# Patient Record
Sex: MALE | Race: WHITE | NOT HISPANIC OR LATINO | Employment: FULL TIME | URBAN - METROPOLITAN AREA
[De-identification: names, ages, dates, MRNs, and addresses within clinical notes are randomized per-mention and may not be internally consistent; named-entity substitution may affect disease eponyms.]

---

## 2017-02-02 ENCOUNTER — TELEPHONE (OUTPATIENT)
Dept: UROLOGY | Facility: CLINIC | Age: 57
End: 2017-02-02

## 2017-02-02 NOTE — TELEPHONE ENCOUNTER
----- Message from Valeria Rothman sent at 2/1/2017  8:24 PM CST -----  Contact: self  Appointment Request From: Cortes Schroeder         With Provider: Other - (see comments) [oth]        Would Accept With:Request to see a new provider        Preferred Date Range: From 1/30/2017 To 2/28/2017        Preferred Times: Monday Afternoon, Friday Afternoon        Reason for visit: Request an Appt        Comments:    Would like to have an appoint scheduled with Dr. Destin Villegas, (I am a patient of his partner Brooke Carlos A, currently)    Would like the appointment be in the [facility] location.  This is upon suggestion of  another physician, have sent an email to Dr. Acosta.

## 2017-02-10 ENCOUNTER — TELEPHONE (OUTPATIENT)
Dept: FAMILY MEDICINE | Facility: CLINIC | Age: 57
End: 2017-02-10

## 2017-02-10 DIAGNOSIS — E78.2 MIXED HYPERLIPIDEMIA: ICD-10-CM

## 2017-02-10 RX ORDER — PRAVASTATIN SODIUM 10 MG/1
10 TABLET ORAL DAILY
Qty: 90 TABLET | Refills: 0 | Status: CANCELLED | OUTPATIENT
Start: 2017-02-10 | End: 2018-02-10

## 2017-02-10 NOTE — TELEPHONE ENCOUNTER
----- Message from Treva Hurtado sent at 2/10/2017 10:50 AM CST -----  Contact: self  Refill : fluoxetine (PROZAC) 40 MG capsule ()             tramadol (ULTRAM) 50 mg tablet             meloxicam (MOBIC) 7.5 MG tablet             zolpidem (AMBIEN) 10 mg Tab             pravastatin (PRAVACHOL) 10 MG tablet      Pharmacy : [facility] DELIV[facility]  Pharmacy# [phone]

## 2017-02-10 NOTE — TELEPHONE ENCOUNTER
----- Message from Treva Hurtado sent at 2/10/2017 10:56 AM CST -----  Contact: Self  Pt calling to speak to a nurse. Pt would like to know when does his doctor want him to come in for an apt. Please call [phone]

## 2017-02-13 ENCOUNTER — OFFICE VISIT (OUTPATIENT)
Dept: FAMILY MEDICINE | Facility: CLINIC | Age: 57
End: 2017-02-13
Payer: COMMERCIAL

## 2017-02-13 VITALS
SYSTOLIC BLOOD PRESSURE: 166 MMHG | TEMPERATURE: 98 F | WEIGHT: 196.44 LBS | HEIGHT: 65 IN | RESPIRATION RATE: 16 BRPM | HEART RATE: 84 BPM | OXYGEN SATURATION: 97 % | DIASTOLIC BLOOD PRESSURE: 90 MMHG | BODY MASS INDEX: 32.73 KG/M2

## 2017-02-13 DIAGNOSIS — E78.2 MIXED HYPERLIPIDEMIA: ICD-10-CM

## 2017-02-13 DIAGNOSIS — K21.9 GASTROESOPHAGEAL REFLUX DISEASE WITHOUT ESOPHAGITIS: ICD-10-CM

## 2017-02-13 DIAGNOSIS — I10 HYPERTENSION, UNCONTROLLED: Primary | ICD-10-CM

## 2017-02-13 DIAGNOSIS — F41.9 ANXIETY: ICD-10-CM

## 2017-02-13 PROCEDURE — 99214 OFFICE O/P EST MOD 30 MIN: CPT | Mod: S$GLB,,, | Performed by: FAMILY MEDICINE

## 2017-02-13 PROCEDURE — 3077F SYST BP >= 140 MM HG: CPT | Mod: S$GLB,,, | Performed by: FAMILY MEDICINE

## 2017-02-13 PROCEDURE — 99999 PR PBB SHADOW E&M-EST. PATIENT-LVL III: CPT | Mod: PBBFAC,,, | Performed by: FAMILY MEDICINE

## 2017-02-13 PROCEDURE — 3080F DIAST BP >= 90 MM HG: CPT | Mod: S$GLB,,, | Performed by: FAMILY MEDICINE

## 2017-02-13 RX ORDER — ASPIRIN 325 MG
325 TABLET ORAL DAILY
COMMUNITY
End: 2017-02-13

## 2017-02-13 RX ORDER — PANTOPRAZOLE SODIUM 40 MG/1
40 TABLET, DELAYED RELEASE ORAL DAILY
Qty: 90 TABLET | Refills: 0 | Status: SHIPPED | OUTPATIENT
Start: 2017-02-13 | End: 2017-04-28

## 2017-02-13 RX ORDER — MELOXICAM 15 MG/1
15 TABLET ORAL DAILY
COMMUNITY
Start: 2016-12-13 | End: 2017-02-16 | Stop reason: SDUPTHER

## 2017-02-13 RX ORDER — FLUOXETINE HYDROCHLORIDE 40 MG/1
40 CAPSULE ORAL 2 TIMES DAILY
Qty: 180 CAPSULE | Refills: 3 | Status: SHIPPED | OUTPATIENT
Start: 2017-02-13 | End: 2018-04-12 | Stop reason: SDUPTHER

## 2017-02-13 RX ORDER — PANTOPRAZOLE SODIUM 40 MG/1
40 TABLET, DELAYED RELEASE ORAL DAILY
Qty: 90 TABLET | Refills: 0 | Status: SHIPPED | OUTPATIENT
Start: 2017-02-13 | End: 2017-02-13 | Stop reason: SDUPTHER

## 2017-02-13 RX ORDER — TRAMADOL HYDROCHLORIDE 50 MG/1
50 TABLET ORAL 2 TIMES DAILY
COMMUNITY
Start: 2016-12-13 | End: 2017-03-03

## 2017-02-13 RX ORDER — PRAVASTATIN SODIUM 80 MG/1
80 TABLET ORAL DAILY
Qty: 90 TABLET | Refills: 3 | Status: SHIPPED | OUTPATIENT
Start: 2017-02-13 | End: 2017-03-31 | Stop reason: SDUPTHER

## 2017-02-13 RX ORDER — TRAZODONE HYDROCHLORIDE 50 MG/1
50 TABLET ORAL NIGHTLY
Qty: 30 TABLET | Refills: 11 | Status: SHIPPED | OUTPATIENT
Start: 2017-02-13 | End: 2017-03-31

## 2017-02-13 RX ORDER — HYDROCHLOROTHIAZIDE 12.5 MG/1
12.5 TABLET ORAL DAILY
Qty: 90 TABLET | Refills: 2 | Status: SHIPPED | OUTPATIENT
Start: 2017-02-13 | End: 2017-03-22

## 2017-02-13 RX ORDER — PRAVASTATIN SODIUM 80 MG/1
80 TABLET ORAL DAILY
Qty: 90 TABLET | Refills: 3 | Status: SHIPPED | OUTPATIENT
Start: 2017-02-13 | End: 2017-02-13 | Stop reason: SDUPTHER

## 2017-02-13 RX ORDER — PRAVASTATIN SODIUM 10 MG/1
10 TABLET ORAL DAILY
Qty: 90 TABLET | Refills: 0 | Status: CANCELLED | OUTPATIENT
Start: 2017-02-13 | End: 2018-02-13

## 2017-02-13 RX ORDER — ASPIRIN 81 MG/1
81 TABLET ORAL DAILY
Refills: 0
Start: 2017-02-13 | End: 2022-02-09

## 2017-02-13 NOTE — PROGRESS NOTES
"Subjective:       Patient ID: Cortes Schroeder is a 57 y.o. male.    Chief Complaint: Abdominal Pain (follow up ); Hypertension (follow up - need med refill); Insomnia (med refill  - consult); Back Pain (med refill ); and Cough (x1 day with cream mucous )    HPI    Abd pain - pt saw GI, who stated that his pain is not Gi related, and referred him to urology for some stones that were seen on a scan. Pain is R sided and occurs daily. It is a/w some chest pain, that is being evaluated by cardiology.     Htn - Bp is elevated and has been per patient at his other doctor visits. He is not checking his blood pressure at home. Denies CP, SOB.      Insomnia- pt has persistent insomnia and has been on Ambien for years.  He is under a lot of stress from work, guilt for his father's death. He is interested to switching to another medication.    Anxiety - pt has persistent anxiety depsite fluoxetine 80mg daily. This makes it very difficult for him to sleep. He has a lot of social stressors    Current Outpatient Prescriptions on File Prior to Visit   Medication Sig Dispense Refill    BD ULTRA-FINE DANNY PEN NEEDLES 32 gauge x 5/32" Ndle Inject 1 each into the skin 4 (four) times daily before meals and nightly. 120 each 11    blood sugar diagnostic Strp 1 strip by Misc.(Non-Drug; Combo Route) route 3 (three) times daily. 300 strip 1    fish oil-omega-3 fatty acids 300-1,000 mg capsule Take by mouth 2 (two) times daily.      FREESTYLE LANCETS 28 gauge lancets USE 1 NEEDLE 4 TIMES A DAY  5    gabapentin (NEURONTIN) 100 MG capsule Take 1 capsule (100 mg total) by mouth once daily. 90 capsule 3    magnesium oxide-Mg AA chelate (MG-PLUS-PROTEIN) 133 mg Tab Take 500 mg by mouth every evening.       NOVOLOG FLEXPEN 100 unit/mL InPn pen Inject 22 Units into the skin 3 (three) times daily with meals. 59.4 mL 3    TOUJEO SOLOSTAR 300 unit/mL (1.5 mL) InPn pen Inject 70 Units into the skin every evening. 21 mL 3    vitamin D 1000 " units Tab Take 185 mg by mouth 2 (two) times daily.       [DISCONTINUED] fluoxetine (PROZAC) 40 MG capsule Take 1 capsule (40 mg total) by mouth 2 (two) times daily. 180 capsule 3    [DISCONTINUED] meloxicam (MOBIC) 7.5 MG tablet Take 7.5 mg by mouth once daily.      [DISCONTINUED] pantoprazole (PROTONIX) 40 MG tablet Take 1 tablet (40 mg total) by mouth once daily. 45 tablet 1    [DISCONTINUED] pravastatin (PRAVACHOL) 10 MG tablet Take 1 tablet (10 mg total) by mouth once daily. 90 tablet 3    [DISCONTINUED] tizanidine (ZANAFLEX) 4 MG tablet Take 1 tablet (4 mg total) by mouth nightly as needed. 25 tablet 0    [DISCONTINUED] zolpidem (AMBIEN) 10 mg Tab Take 5 mg by mouth nightly as needed.       No current facility-administered medications on file prior to visit.        Review of Systems   Respiratory: Negative for shortness of breath.    Cardiovascular: Negative for chest pain.   Gastrointestinal: Positive for abdominal pain. Negative for vomiting.       Objective:     Vitals:    02/13/17 0846   BP: (!) 166/90   Pulse: 84   Resp: 16   Temp: 98.3 °F (36.8 °C)        Physical Exam    Assessment:       1. Hypertension, uncontrolled    2. Anxiety    3. Mixed hyperlipidemia    4. Gastroesophageal reflux disease without esophagitis        Plan:       Cortes was seen today for abdominal pain, hypertension, insomnia, back pain and cough.    Diagnoses and all orders for this visit:    Hypertension, uncontrolled  -     hydrochlorothiazide (HYDRODIURIL) 12.5 MG Tab; Take 1 tablet (12.5 mg total) by mouth once daily.    -     aspirin (ECOTRIN) 81 MG EC tablet; Take 1 tablet (81 mg total) by mouth once daily.    Pts blood pressure is uncontrolled. I advised the following lifestyle changes:  - exercise for 20 mins x 3-5 a week per AHA recommendations  - weight reduction if overweight by calorie restriction  - increase consumption of fruit/vegetables to 5 or more servings a day        - reduce sodium intake    At this  stage, we discussed that their risk for MI and CVA is too high with their current BP, and will adjust his medications by adding hctz and aspirin 81mg daily          Pt asked to keep BP log with date/BP, taking BP at least 4 x a week. They will bring this with them to their next appointment.     Pt asked to call me if BPs consistently above 140/90.    Pts questions were answered.    The 10-year CVD risk score (CONCHIS'Agoino, et al., 2008) is: 43.2%    Values used to calculate the score:      Age: 57 years      Sex: Male      Diabetic: Yes      Tobacco smoker: No      Systolic Blood Pressure: 166 mmHg      Is BP treated: Yes      HDL Cholesterol: 48 mg/dL      Total Cholesterol: 218 mg/dL    Anxiety  -     trazodone (DESYREL) 50 MG tablet; Take 1 tablet (50 mg total) by mouth every evening.  -     fluoxetine (PROZAC) 40 MG capsule; Take 1 capsule (40 mg total) by mouth 2 (two) times daily.    Will add trazadone in hopes of acquiring better control of his anxiety and in hopes that this will help him sleep. Giving this in lieu of ambien.      Mixed hyperlipidemia  -     pravastatin (PRAVACHOL) 80 MG tablet; Take 1 tablet (80 mg total) by mouth once daily.  - Chronic - stable     Pt is doing well on current therapy and is requesting a refill. No side effects noted. Will continue current therapy.       Gastroesophageal reflux disease without esophagitis  -     pantoprazole (PROTONIX) 40 MG tablet; Take 1 tablet (40 mg total) by mouth once daily.  - Chronic - stable     Pt is doing well on current therapy and is requesting a refill. No side effects noted. Will continue current therapy.               Return for Diabetes Type 2, Hypertension.        Pt verbalized understanding and agreed with our plan.

## 2017-02-13 NOTE — MR AVS SNAPSHOT
[facility]  [address]  [place] [address]  Phone: [phone]  Fax: [phone]                  Cortes Schroeder   2017 8:40 AM   Office Visit    Description:  Male : 1960   Provider:  Hira Acosta MD   Department:  [facility]           Reason for Visit     Abdominal Pain     Hypertension     Insomnia     Back Pain     Cough           Diagnoses this Visit        Comments    Hypertension, uncontrolled    -  Primary     Anxiety         Mixed hyperlipidemia         Gastroesophageal reflux disease without esophagitis                To Do List           Future Appointments        Provider Department Dept Phone    2017 4:00 PM DEEPTHI Villegas MD [facility] [phone]      Goals (5 Years of Data)     None       These Medications        Disp Refills Start End    pantoprazole (PROTONIX) 40 MG tablet 90 tablet 0 2017    Take 1 tablet (40 mg total) by mouth once daily. - Oral    Pharmacy: [facility] [address] AT [facility] #: [phone]       hydrochlorothiazide (HYDRODIURIL) 12.5 MG Tab 90 tablet 2 2017     Take 1 tablet (12.5 mg total) by mouth once daily. - Oral    Pharmacy: [facility] [address] AT [facility] #: [phone]       trazodone (DESYREL) 50 MG tablet 30 tablet 11 2017    Take 1 tablet (50 mg total) by mouth every evening. - Oral    Pharmacy: [facility] [address] AT [facility] #: [phone]       pravastatin (PRAVACHOL) 80 MG tablet 90 tablet 3 2017    Take 1 tablet (80 mg total) by mouth once daily. - Oral    Pharmacy: [facility] [address] AT [facility] #: [phone]       aspirin (ECOTRIN) 81 MG EC tablet  0 2017    Take 1 tablet (81 mg total)  "by mouth once daily. - Oral    Pharm[facility]: [id number] [place] [facility] AT [facility]  #: [phone]         [facility] On [facility]     [facility] On Call Nurse Care Line - 24/7 Assistance  Registered nurses in the [facility] provide clinical advisement, health education, appointment booking, and other advisory services.  Call for this free service at [phone].             Medications           Message regarding Medications     Verify the changes and/or additions to your medication regime listed below are the same as discussed with your clinician today.  If any of these changes or additions are incorrect, please notify your healthcare provider.        START taking these NEW medications        Refills    hydrochlorothiazide (HYDRODIURIL) 12.5 MG Tab 2    Sig: Take 1 tablet (12.5 mg total) by mouth once daily.    Class: Normal    Route: Oral    trazodone (DESYREL) 50 MG tablet 11    Sig: Take 1 tablet (50 mg total) by mouth every evening.    Class: Normal    Route: Oral    pravastatin (PRAVACHOL) 80 MG tablet 3    Sig: Take 1 tablet (80 mg total) by mouth once daily.    Class: Normal    Route: Oral    aspirin (ECOTRIN) 81 MG EC tablet 0    Sig: Take 1 tablet (81 mg total) by mouth once daily.    Class: No Print    Route: Oral      STOP taking these medications     tizanidine (ZANAFLEX) 4 MG tablet Take 1 tablet (4 mg total) by mouth nightly as needed.    zolpidem (AMBIEN) 10 mg Tab Take 5 mg by mouth nightly as needed.    aspirin 325 MG tablet Take 325 mg by mouth once daily.           Verify that the below list of medications is an accurate representation of the medications you are currently taking.  If none reported, the list may be blank. If incorrect, please contact your healthcare provider. Carry this list with you in case of emergency.           Current Medications     BD ULTRA-FINE DANNY PEN NEEDLES 32 gauge x 5/32" Ndle Inject 1 each into the skin 4 (four) " "times daily before meals and nightly.    blood sugar diagnostic Strp 1 strip by Misc.(Non-Drug; Combo Route) route 3 (three) times daily.    fish oil-omega-3 fatty acids 300-1,000 mg capsule Take by mouth 2 (two) times daily.    fluoxetine (PROZAC) 40 MG capsule Take 1 capsule (40 mg total) by mouth 2 (two) times daily.    FREESTYLE LANCETS 28 gauge lancets USE 1 NEEDLE 4 TIMES A DAY    gabapentin (NEURONTIN) 100 MG capsule Take 1 capsule (100 mg total) by mouth once daily.    magnesium oxide-Mg AA chelate (MG-PLUS-PROTEIN) 133 mg Tab Take 500 mg by mouth every evening.     meloxicam (MOBIC) 15 MG tablet Take 15 mg by mouth once daily.     NOVOLOG FLEXPEN 100 unit/mL InPn pen Inject 22 Units into the skin 3 (three) times daily with meals.    pantoprazole (PROTONIX) 40 MG tablet Take 1 tablet (40 mg total) by mouth once daily.    TOUJEO SOLOSTAR 300 unit/mL (1.5 mL) InPn pen Inject 70 Units into the skin every evening.    tramadol (ULTRAM) 50 mg tablet Take 50 mg by mouth 2 (two) times daily.     vitamin D 1000 units Tab Take 185 mg by mouth 2 (two) times daily.     aspirin (ECOTRIN) 81 MG EC tablet Take 1 tablet (81 mg total) by mouth once daily.    hydrochlorothiazide (HYDRODIURIL) 12.5 MG Tab Take 1 tablet (12.5 mg total) by mouth once daily.    pravastatin (PRAVACHOL) 80 MG tablet Take 1 tablet (80 mg total) by mouth once daily.    trazodone (DESYREL) 50 MG tablet Take 1 tablet (50 mg total) by mouth every evening.           Clinical Reference Information           Your Vitals Were     BP Pulse Temp Resp Height Weight    166/90 (BP Location: Left arm, Patient Position: Sitting, BP Method: Manual) 84 98.3 °F (36.8 °C) (Oral) 16 5' 5" (1.651 m) 89.1 kg (196 lb 6.9 oz)    SpO2 BMI             97% 32.69 kg/m2         Blood Pressure          Most Recent Value    BP  (!)  166/90      Allergies as of 2/13/2017     Invokana [Canagliflozin]    Percocet [Oxycodone-acetaminophen]    Biaxin [Clarithromycin]    Hydrocodone    " Sulfa (Sulfonamide Antibiotics)      Immunizations Administered on Date of Encounter - 2/13/2017     None      Language Assistance Services     ATTENTION: Language assistance services are available, free of charge. Please call [phone].      ATENCIÓN: Si chinmay gibson, tiene a cummings disposición servicios gratuitos de asistencia lingüística. Llame al [phone].     CHÚ Ý: N?u b?n nói Ti?ng Vi?t, có các d?ch v? h? tr? ngôn ng? mi?n phí dành cho b?n. G?i s? [phone].         [facility] complies with applicable Federal civil rights laws and does not discriminate on the basis of race, color, national origin, age, disability, or sex.

## 2017-02-16 ENCOUNTER — PATIENT MESSAGE (OUTPATIENT)
Dept: FAMILY MEDICINE | Facility: CLINIC | Age: 57
End: 2017-02-16

## 2017-02-16 RX ORDER — MELOXICAM 15 MG/1
15 TABLET ORAL DAILY
Qty: 90 TABLET | Refills: 0 | Status: SHIPPED | OUTPATIENT
Start: 2017-02-16 | End: 2017-03-03

## 2017-02-16 NOTE — TELEPHONE ENCOUNTER
Patient notified no information can be given in regards to another patient through patient portal. Patient very upset about medication not being prescribed, I did inform patient that I would look into it to have  review for approval.

## 2017-02-21 ENCOUNTER — OFFICE VISIT (OUTPATIENT)
Dept: UROLOGY | Facility: CLINIC | Age: 57
End: 2017-02-21
Payer: COMMERCIAL

## 2017-02-21 VITALS
HEIGHT: 65 IN | SYSTOLIC BLOOD PRESSURE: 142 MMHG | HEART RATE: 72 BPM | DIASTOLIC BLOOD PRESSURE: 92 MMHG | BODY MASS INDEX: 32.69 KG/M2 | WEIGHT: 196.19 LBS

## 2017-02-21 DIAGNOSIS — R35.1 NOCTURIA MORE THAN TWICE PER NIGHT: ICD-10-CM

## 2017-02-21 DIAGNOSIS — N13.8 BPH WITH OBSTRUCTION/LOWER URINARY TRACT SYMPTOMS: ICD-10-CM

## 2017-02-21 DIAGNOSIS — N52.9 ERECTILE DYSFUNCTION, UNSPECIFIED ERECTILE DYSFUNCTION TYPE: ICD-10-CM

## 2017-02-21 DIAGNOSIS — N40.1 BPH WITH OBSTRUCTION/LOWER URINARY TRACT SYMPTOMS: ICD-10-CM

## 2017-02-21 DIAGNOSIS — B37.89 CANDIDA RASH OF GROIN: ICD-10-CM

## 2017-02-21 DIAGNOSIS — N13.30 HYDRONEPHROSIS, UNSPECIFIED HYDRONEPHROSIS TYPE: Primary | ICD-10-CM

## 2017-02-21 LAB
BILIRUB SERPL-MCNC: NORMAL MG/DL
BLOOD URINE, POC: NORMAL
COLOR, POC UA: YELLOW
GLUCOSE UR QL STRIP: POSITIVE
KETONES UR QL STRIP: NORMAL
LEUKOCYTE ESTERASE URINE, POC: NORMAL
NITRITE, POC UA: NORMAL
PH, POC UA: 5
PROTEIN, POC: NORMAL
SPECIFIC GRAVITY, POC UA: 1030
UROBILINOGEN, POC UA: NORMAL

## 2017-02-21 PROCEDURE — 87086 URINE CULTURE/COLONY COUNT: CPT

## 2017-02-21 PROCEDURE — 99999 PR PBB SHADOW E&M-EST. PATIENT-LVL III: CPT | Mod: PBBFAC,,, | Performed by: UROLOGY

## 2017-02-21 PROCEDURE — 3077F SYST BP >= 140 MM HG: CPT | Mod: S$GLB,,, | Performed by: UROLOGY

## 2017-02-21 PROCEDURE — 1160F RVW MEDS BY RX/DR IN RCRD: CPT | Mod: S$GLB,,, | Performed by: UROLOGY

## 2017-02-21 PROCEDURE — 3080F DIAST BP >= 90 MM HG: CPT | Mod: S$GLB,,, | Performed by: UROLOGY

## 2017-02-21 PROCEDURE — 81001 URINALYSIS AUTO W/SCOPE: CPT | Mod: S$GLB,,, | Performed by: UROLOGY

## 2017-02-21 PROCEDURE — 99215 OFFICE O/P EST HI 40 MIN: CPT | Mod: 25,S$GLB,, | Performed by: UROLOGY

## 2017-02-21 RX ORDER — TAMSULOSIN HYDROCHLORIDE 0.4 MG/1
0.4 CAPSULE ORAL DAILY
Qty: 30 CAPSULE | Refills: 11 | Status: SHIPPED | OUTPATIENT
Start: 2017-02-21 | End: 2017-03-23

## 2017-02-21 RX ORDER — CLOTRIMAZOLE AND BETAMETHASONE DIPROPIONATE 10; .64 MG/G; MG/G
CREAM TOPICAL 2 TIMES DAILY
Qty: 15 G | Refills: 5 | Status: SHIPPED | OUTPATIENT
Start: 2017-02-21 | End: 2019-08-01

## 2017-02-21 RX ORDER — FLUCONAZOLE 100 MG/1
100 TABLET ORAL DAILY
Qty: 10 TABLET | Refills: 0 | Status: SHIPPED | OUTPATIENT
Start: 2017-02-21 | End: 2017-03-23

## 2017-02-21 NOTE — PROGRESS NOTES
Subjective:       Patient ID: Cortes Schroeder is a 57 y.o. male who was last seen in this office Visit date not found    Chief Complaint:   Chief Complaint   Patient presents with    Follow-up     possible kidney stones       History of Kidney Stones  He has never required a procedure.  He passed a stone in 2003.  He had a stone in 2015 based on CT.  He had an ultrasound in October when he was having some of his abscess issues.  There was no stone or hydro at that time.  He is back today with an ultrasound from [facility].  It shows Right hydronephrosis.  He is having some epigastric pain and some right upper quadrant pain.  He denies fever or hematuria.    Erectile Dysfunction  Patient complains of erectile dysfunction. Onset of dysfunction was several years ago and was gradual in onset.  Patient states the nature of difficulty is both attaining and maintaining erection. Full erections occur never. Partial erections occur never. Libido is not affected. Risk factors for ED include diabetes mellitus. Patient denies history of pelvic radiation. Previous treatment of ED includes Viagra and Cialis and Trimix.  He is willing to try Trimix again.    Benign Prostatic Hyperplasia  He patient reports frequency and nocturia two times a night. He denies straining, urgency and weak stream. The patient states symptoms are of mild severity. Onset of symptoms was several years ago and was gradual in onset.  He has no personal history and no family history of prostate cancer. He reports a history of kidney stones. He denies flank pain, gross hematuria and recurrent UTI.  He is currently taking no prostate medications.    Groin Rash  He has used Diflucan in the past.  It is very itchy and sometimes bleeds from scratching too much.    Scrotal Abscess  He had an abscess of the scrotum that was treated in the ED in November 2016.  He was kept in the hospital for several days.  He has some pains at times but is mostly better.    ACTIVE  "MEDICAL ISSUES:  Patient Active Problem List   Diagnosis    Hyperlipidemia    Anxiety    Back pain    Neuropathy    DM (diabetes mellitus), type 2, uncontrolled    NPDR (nonproliferative diabetic retinopathy)    Insomnia    Gastroesophageal reflux disease without esophagitis    Chronic pain syndrome    Scrotal abscess       ALLERGIES AND MEDICATIONS: updated and reviewed.  Review of patient's allergies indicates:   Allergen Reactions    Invokana [canagliflozin] Anaphylaxis    Percocet [oxycodone-acetaminophen] Nausea Only and Hallucinations    Biaxin [clarithromycin]     Hydrocodone Other (See Comments)     Dizzy/nausea/hallucinations    Sulfa (sulfonamide antibiotics) Nausea Only and Rash     Current Outpatient Prescriptions   Medication Sig    aspirin (ECOTRIN) 81 MG EC tablet Take 1 tablet (81 mg total) by mouth once daily.    BD ULTRA-FINE DANNY PEN NEEDLES 32 gauge x 5/32" Ndle Inject 1 each into the skin 4 (four) times daily before meals and nightly.    blood sugar diagnostic Strp 1 strip by Misc.(Non-Drug; Combo Route) route 3 (three) times daily.    fish oil-omega-3 fatty acids 300-1,000 mg capsule Take by mouth 2 (two) times daily.    fluoxetine (PROZAC) 40 MG capsule Take 1 capsule (40 mg total) by mouth 2 (two) times daily.    FREESTYLE LANCETS 28 gauge lancets USE 1 NEEDLE 4 TIMES A DAY    gabapentin (NEURONTIN) 100 MG capsule Take 1 capsule (100 mg total) by mouth once daily.    hydrochlorothiazide (HYDRODIURIL) 12.5 MG Tab Take 1 tablet (12.5 mg total) by mouth once daily.    magnesium oxide-Mg AA chelate (MG-PLUS-PROTEIN) 133 mg Tab Take 500 mg by mouth every evening.     meloxicam (MOBIC) 15 MG tablet Take 1 tablet (15 mg total) by mouth once daily.    NOVOLOG FLEXPEN 100 unit/mL InPn pen Inject 22 Units into the skin 3 (three) times daily with meals.    pantoprazole (PROTONIX) 40 MG tablet Take 1 tablet (40 mg total) by mouth once daily.    pravastatin (PRAVACHOL) 80 MG " "tablet Take 1 tablet (80 mg total) by mouth once daily.    TOUJEO SOLOSTAR 300 unit/mL (1.5 mL) InPn pen Inject 70 Units into the skin every evening.    tramadol (ULTRAM) 50 mg tablet Take 50 mg by mouth 2 (two) times daily.     trazodone (DESYREL) 50 MG tablet Take 1 tablet (50 mg total) by mouth every evening.    vitamin D 1000 units Tab Take 185 mg by mouth 2 (two) times daily.     clotrimazole-betamethasone 1-0.05% (LOTRISONE) cream Apply topically 2 (two) times daily.    fluconazole (DIFLUCAN) 100 MG tablet Take 1 tablet (100 mg total) by mouth once daily.    pep injection Inject 0.5 ml as directed     For compounding pharmacy use:   Add PAPAVERINE 30 mcg  Add PHENTOLAMINE 10 mg  Add ALPROSTADIL 100 mcg    syringe, disposable, 1 mL Syrg Use as directed    tamsulosin (FLOMAX) 0.4 mg Cp24 Take 1 capsule (0.4 mg total) by mouth once daily.     No current facility-administered medications for this visit.        Review of Systems    Objective:      Vitals:    02/21/17 1620   BP: (!) 142/92   Pulse: 72   Weight: 89 kg (196 lb 3.4 oz)   Height: 5' 5" (1.651 m)     Physical Exam    Urine dipstick shows negative for all components.  Micro exam: negative for WBC's or RBC's.    Assessment:       1. Hydronephrosis, unspecified hydronephrosis type    2. BPH with obstruction/lower urinary tract symptoms    3. Nocturia more than twice per night    4. Erectile dysfunction, unspecified erectile dysfunction type    5. Candida rash of groin          Plan:       1. Hydronephrosis, unspecified hydronephrosis type    - CT Renal Stone Study ABD Pelvis WO; Future    2. BPH with obstruction/lower urinary tract symptoms    - tamsulosin (FLOMAX) 0.4 mg Cp24; Take 1 capsule (0.4 mg total) by mouth once daily.  Dispense: 30 capsule; Refill: 11  - POCT urinalysis, dipstick or tablet reag  - Urine culture    3. Nocturia more than twice per night  Flomax    4. Erectile dysfunction, unspecified erectile dysfunction type    - pep " injection; Inject 0.5 ml as directed     For compounding pharmacy use:   Add PAPAVERINE 30 mcg  Add PHENTOLAMINE 10 mg  Add ALPROSTADIL 100 mcg  Dispense: 1 vial; Refill: 0  - syringe, disposable, 1 mL Syrg; Use as directed  Dispense: 100 Syringe; Refill: 11    5. Candida rash of groin    - clotrimazole-betamethasone 1-0.05% (LOTRISONE) cream; Apply topically 2 (two) times daily.  Dispense: 15 g; Refill: 5  - fluconazole (DIFLUCAN) 100 MG tablet; Take 1 tablet (100 mg total) by mouth once daily.  Dispense: 10 tablet; Refill: 0            Return in about 2 weeks (around 3/7/2017) for Follow up, Review X-ray.

## 2017-02-21 NOTE — MR AVS SNAPSHOT
[facility]  [address] [facility] [place]  [address]  [place] [address]  Phone: [phone]                  Cortes Schroeder   2017 4:00 PM   Office Visit    Description:  Male : 1960   Provider:  DEEPTHI Villegas MD   Department:  [facility]           Reason for Visit     Follow-up           Diagnoses this Visit        Comments    Hydronephrosis, unspecified hydronephrosis type    -  Primary     BPH with obstruction/lower urinary tract symptoms         Nocturia more than twice per night         Erectile dysfunction, unspecified erectile dysfunction type         Candida rash of groin                To Do List           Future Appointments        Provider Department Dept Phone    3/3/2017 8:00 AM Hira Acosta MD [facility] [phone]      Goals (5 Years of Data)     None      Follow-Up and Disposition     Return in about 2 weeks (around 3/7/2017) for Follow up, Review X-ray.       These Medications        Disp Refills Start End    clotrimazole-betamethasone 1-0.05% (LOTRISONE) cream 15 g 5 2017     Apply topically 2 (two) times daily. - Topical (Top)    Pharmacy: [facility] [address] [place] [address] AT [facility] #: [phone]       fluconazole (DIFLUCAN) 100 MG tablet 10 tablet 0 2017 3/23/2017    Take 1 tablet (100 mg total) by mouth once daily. - Oral    Pharmacy: [facility] [address] [place] [address] AT [facility] #: [phone]       pep injection 1 vial 0 2017     Inject 0.5 ml as directed     For compounding pharmacy use:   Add PAPAVERINE 30 mcg  Add PHENTOLAMINE 10 mg  Add ALPROSTADIL 100 mcg    Pharmacy: [facility] [address] [place] [address] AT [facility] #: [phone]       syringe, disposable, 1 mL Syrg 100 Syringe 11 2017     Use as directed    Pharmacy: [facility] [address] [place] [address]  AT [facility] #: [phone]       tamsulosin (FLOMAX) 0.4 mg Cp24 30 capsule 11 2/21/2017 3/23/2017    Take 1 capsule (0.4 mg total) by mouth once daily. - Oral    Pharmacy: [facility] [id number]  [address] [facility] AT [facility] #: [phone]         [facility] On Call     [facility] On Call Nurse Care Line - 24/7 Assistance  Registered nurses in the [facility] On Call Center provide clinical advisement, health education, appointment booking, and other advisory services.  Call for this free service at [phone].             Medications           Message regarding Medications     Verify the changes and/or additions to your medication regime listed below are the same as discussed with your clinician today.  If any of these changes or additions are incorrect, please notify your healthcare provider.        START taking these NEW medications        Refills    clotrimazole-betamethasone 1-0.05% (LOTRISONE) cream 5    Sig: Apply topically 2 (two) times daily.    Class: Normal    Route: Topical (Top)    fluconazole (DIFLUCAN) 100 MG tablet 0    Sig: Take 1 tablet (100 mg total) by mouth once daily.    Class: Normal    Route: Oral    pep injection 0    Sig: Inject 0.5 ml as directed     For compounding pharmacy use:   Add PAPAVERINE 30 mcg  Add PHENTOLAMINE 10 mg  Add ALPROSTADIL 100 mcg    Class: Print    syringe, disposable, 1 mL Syrg 11    Sig: Use as directed    Class: Print    tamsulosin (FLOMAX) 0.4 mg Cp24 11    Sig: Take 1 capsule (0.4 mg total) by mouth once daily.    Class: Normal    Route: Oral           Verify that the below list of medications is an accurate representation of the medications you are currently taking.  If none reported, the list may be blank. If incorrect, please contact your healthcare provider. Carry this list with you in case of emergency.           Current Medications     aspirin (ECOTRIN) 81 MG EC tablet Take 1 tablet (81 mg total) by mouth once  "daily.    BD ULTRA-FINE DANNY PEN NEEDLES 32 gauge x 5/32" Ndle Inject 1 each into the skin 4 (four) times daily before meals and nightly.    blood sugar diagnostic Strp 1 strip by Misc.(Non-Drug; Combo Route) route 3 (three) times daily.    fish oil-omega-3 fatty acids 300-1,000 mg capsule Take by mouth 2 (two) times daily.    fluoxetine (PROZAC) 40 MG capsule Take 1 capsule (40 mg total) by mouth 2 (two) times daily.    FREESTYLE LANCETS 28 gauge lancets USE 1 NEEDLE 4 TIMES A DAY    gabapentin (NEURONTIN) 100 MG capsule Take 1 capsule (100 mg total) by mouth once daily.    hydrochlorothiazide (HYDRODIURIL) 12.5 MG Tab Take 1 tablet (12.5 mg total) by mouth once daily.    magnesium oxide-Mg AA chelate (MG-PLUS-PROTEIN) 133 mg Tab Take 500 mg by mouth every evening.     meloxicam (MOBIC) 15 MG tablet Take 1 tablet (15 mg total) by mouth once daily.    NOVOLOG FLEXPEN 100 unit/mL InPn pen Inject 22 Units into the skin 3 (three) times daily with meals.    pantoprazole (PROTONIX) 40 MG tablet Take 1 tablet (40 mg total) by mouth once daily.    pravastatin (PRAVACHOL) 80 MG tablet Take 1 tablet (80 mg total) by mouth once daily.    TOUJEO SOLOSTAR 300 unit/mL (1.5 mL) InPn pen Inject 70 Units into the skin every evening.    tramadol (ULTRAM) 50 mg tablet Take 50 mg by mouth 2 (two) times daily.     trazodone (DESYREL) 50 MG tablet Take 1 tablet (50 mg total) by mouth every evening.    vitamin D 1000 units Tab Take 185 mg by mouth 2 (two) times daily.     clotrimazole-betamethasone 1-0.05% (LOTRISONE) cream Apply topically 2 (two) times daily.    fluconazole (DIFLUCAN) 100 MG tablet Take 1 tablet (100 mg total) by mouth once daily.    pep injection Inject 0.5 ml as directed     For compounding pharmacy use:   Add PAPAVERINE 30 mcg  Add PHENTOLAMINE 10 mg  Add ALPROSTADIL 100 mcg    syringe, disposable, 1 mL Syrg Use as directed    tamsulosin (FLOMAX) 0.4 mg Cp24 Take 1 capsule (0.4 mg total) by mouth once daily.         " "  Clinical Reference Information           Your Vitals Were     BP Pulse Height Weight BMI    142/92 72 5' 5" (1.651 m) 89 kg (196 lb 3.4 oz) 32.65 kg/m2      Blood Pressure          Most Recent Value    BP  (!)  142/92      Allergies as of 2/21/2017     Invokana [Canagliflozin]    Percocet [Oxycodone-acetaminophen]    Biaxin [Clarithromycin]    Hydrocodone    Sulfa (Sulfonamide Antibiotics)      Immunizations Administered on Date of Encounter - 2/21/2017     None      Orders Placed During Today's Visit      Normal Orders This Visit    POCT urinalysis, dipstick or tablet reag     Urine culture     Future Labs/Procedures Expected by Expires    CT Renal Stone Study ABD Pelvis WO  2/21/2017 2/21/2018      Language Assistance Services     ATTENTION: Language assistance services are available, free of charge. Please call [phone].      ATENCIÓN: Si chinmay gibson, tiene a cummings disposición servicios gratuitos de asistencia lingüística. Llame al [phone].     CHÚ Ý: N?u b?n nói Ti?ng Vi?t, có các d?ch v? h? tr? ngôn ng? mi?n phí dành cho b?n. G?i s? [phone].         [facility] complies with applicable Federal civil rights laws and does not discriminate on the basis of race, color, national origin, age, disability, or sex.        "

## 2017-02-23 LAB — BACTERIA UR CULT: NORMAL

## 2017-03-02 ENCOUNTER — HOSPITAL ENCOUNTER (OUTPATIENT)
Dept: RADIOLOGY | Facility: HOSPITAL | Age: 57
Discharge: HOME | End: 2017-03-02
Attending: UROLOGY
Payer: COMMERCIAL

## 2017-03-02 ENCOUNTER — TELEPHONE (OUTPATIENT)
Dept: UROLOGY | Facility: CLINIC | Age: 57
End: 2017-03-02

## 2017-03-02 DIAGNOSIS — N13.30 HYDRONEPHROSIS, UNSPECIFIED HYDRONEPHROSIS TYPE: ICD-10-CM

## 2017-03-02 PROCEDURE — 74176 CT ABD & PELVIS W/O CONTRAST: CPT | Mod: 26,,, | Performed by: RADIOLOGY

## 2017-03-02 PROCEDURE — 74176 CT ABD & PELVIS W/O CONTRAST: CPT | Mod: TC

## 2017-03-02 NOTE — TELEPHONE ENCOUNTER
----- Message from Matildaverna Perkins sent at 3/2/2017  1:22 PM CST -----  Contact: self  Pt returning phone call. Please contact the pt at [phone]. Thanks!

## 2017-03-02 NOTE — TELEPHONE ENCOUNTER
Patient was notified of the CT results and was given instructions to what to look for and what to do if his s/s got worse. Patient states that the pain a lessened, but will call if anything changes.

## 2017-03-03 ENCOUNTER — OFFICE VISIT (OUTPATIENT)
Dept: FAMILY MEDICINE | Facility: CLINIC | Age: 57
End: 2017-03-03
Payer: COMMERCIAL

## 2017-03-03 VITALS
RESPIRATION RATE: 16 BRPM | WEIGHT: 195.56 LBS | OXYGEN SATURATION: 97 % | HEART RATE: 81 BPM | BODY MASS INDEX: 32.58 KG/M2 | HEIGHT: 65 IN | SYSTOLIC BLOOD PRESSURE: 140 MMHG | TEMPERATURE: 98 F | DIASTOLIC BLOOD PRESSURE: 82 MMHG

## 2017-03-03 DIAGNOSIS — Z23 NEED FOR PNEUMOCOCCAL VACCINE: ICD-10-CM

## 2017-03-03 DIAGNOSIS — I10 HYPERTENSION, UNCONTROLLED: Primary | ICD-10-CM

## 2017-03-03 DIAGNOSIS — J30.2 SEASONAL ALLERGIC RHINITIS, UNSPECIFIED ALLERGIC RHINITIS TRIGGER: ICD-10-CM

## 2017-03-03 DIAGNOSIS — F41.9 ANXIETY: ICD-10-CM

## 2017-03-03 DIAGNOSIS — E11.65 UNCONTROLLED TYPE 2 DIABETES MELLITUS WITH DIABETIC POLYNEUROPATHY, UNSPECIFIED LONG TERM INSULIN USE STATUS: ICD-10-CM

## 2017-03-03 DIAGNOSIS — E11.42 UNCONTROLLED TYPE 2 DIABETES MELLITUS WITH DIABETIC POLYNEUROPATHY, UNSPECIFIED LONG TERM INSULIN USE STATUS: ICD-10-CM

## 2017-03-03 PROCEDURE — 90732 PPSV23 VACC 2 YRS+ SUBQ/IM: CPT | Mod: S$GLB,,, | Performed by: FAMILY MEDICINE

## 2017-03-03 PROCEDURE — 90471 IMMUNIZATION ADMIN: CPT | Mod: S$GLB,,, | Performed by: FAMILY MEDICINE

## 2017-03-03 PROCEDURE — 3077F SYST BP >= 140 MM HG: CPT | Mod: S$GLB,,, | Performed by: FAMILY MEDICINE

## 2017-03-03 PROCEDURE — 3046F HEMOGLOBIN A1C LEVEL >9.0%: CPT | Mod: S$GLB,,, | Performed by: FAMILY MEDICINE

## 2017-03-03 PROCEDURE — 99214 OFFICE O/P EST MOD 30 MIN: CPT | Mod: 25,S$GLB,, | Performed by: FAMILY MEDICINE

## 2017-03-03 PROCEDURE — 3079F DIAST BP 80-89 MM HG: CPT | Mod: S$GLB,,, | Performed by: FAMILY MEDICINE

## 2017-03-03 PROCEDURE — 1160F RVW MEDS BY RX/DR IN RCRD: CPT | Mod: S$GLB,,, | Performed by: FAMILY MEDICINE

## 2017-03-03 PROCEDURE — 4010F ACE/ARB THERAPY RXD/TAKEN: CPT | Mod: S$GLB,,, | Performed by: FAMILY MEDICINE

## 2017-03-03 PROCEDURE — 99999 PR PBB SHADOW E&M-EST. PATIENT-LVL III: CPT | Mod: PBBFAC,,, | Performed by: FAMILY MEDICINE

## 2017-03-03 RX ORDER — FLUTICASONE PROPIONATE 50 MCG
1 SPRAY, SUSPENSION (ML) NASAL 2 TIMES DAILY
Qty: 1 BOTTLE | Refills: 3 | Status: SHIPPED | OUTPATIENT
Start: 2017-03-03 | End: 2017-03-31

## 2017-03-03 RX ORDER — LISINOPRIL 5 MG/1
5 TABLET ORAL DAILY
Qty: 90 TABLET | Refills: 0 | Status: SHIPPED | OUTPATIENT
Start: 2017-03-03 | End: 2017-03-31

## 2017-03-03 NOTE — MEDICAL/APP STUDENT
"Subjective:       Patient ID: Cortes Schroeder is a 57 y.o. male.    Chief Complaint: Hypertension (follow-up); Diabetes (follow-up); and Spasms (right leg, started last night)    HPI Comments: Mr Schroeder comes to the clinic today for follow up on elevated blood pressure with medication change from previous visit.  He has not purchased a blood pressure monitor as advised on previous visit, and so has no records of blood pressures in between.  He states he has taken his HCTZ appropriately.    States previous HTN medications (unsure of which) caused him to have mental side effects "I felt goofy, light headed".    Mr Schroeder also complains of dry cough x 3 weeks.  Symptoms are worse in the morning.  He has not taken anything for cough.  When the cough is productive, he produces clear thin mucus.    Hypertension   Associated symptoms include chest pain and shortness of breath.   Diabetes   Hypoglycemia symptoms include nervousness/anxiousness. Associated symptoms include chest pain, fatigue and polyphagia.   Spasms   Associated symptoms include arthralgias, chest pain, coughing, fatigue and myalgias. Pertinent negatives include no chills, congestion, fever or sore throat.     Review of Systems   Constitutional: Positive for fatigue. Negative for activity change, appetite change, chills and fever.   HENT: Positive for postnasal drip and sinus pressure. Negative for congestion, ear pain, rhinorrhea and sore throat.    Eyes: Positive for pain, redness and itching.   Respiratory: Positive for cough and shortness of breath. Negative for chest tightness.    Cardiovascular: Positive for chest pain.        Associated with cough   Endocrine: Positive for polyphagia.   Genitourinary: Negative for difficulty urinating and dysuria.   Musculoskeletal: Positive for arthralgias and myalgias.        Chronic joint/muscle pain   Allergic/Immunologic: Positive for environmental allergies.   Psychiatric/Behavioral: Negative for sleep " disturbance. The patient is nervous/anxious.        Objective:      Physical Exam   Constitutional: He is oriented to person, place, and time. He appears well-developed and well-nourished.   HENT:   Head: Normocephalic and atraumatic.   Right Ear: External ear normal.   Left Ear: External ear normal.   Nose: Nose normal.   Mouth/Throat: Posterior oropharyngeal erythema present.   Eyes: EOM are normal. Pupils are equal, round, and reactive to light.   Neck: Normal range of motion. Neck supple.   Cardiovascular: Normal rate, regular rhythm, normal heart sounds and intact distal pulses.    Pulmonary/Chest: Effort normal and breath sounds normal.   Neurological: He is alert and oriented to person, place, and time.   Skin: Skin is warm and dry.   Psychiatric: His behavior is normal. His mood appears anxious. Cognition and memory are normal.   Vitals reviewed.      Assessment:       1. Need for pneumococcal vaccine    2.      Essential hypertension, uncontrolled  3.      Seasonal allergy  4.      Anxiety  5.      Diabetes mellitus, type 2, uncontrolled  Plan:       Cortes was seen today for hypertension, diabetes and spasms.    Diagnoses and all orders for this visit:    Hypertension, uncontrolled  -     lisinopril (PRINIVIL,ZESTRIL) 5 MG tablet; Take 1 tablet (5 mg total) by mouth once daily.    Uncontrolled type 2 diabetes mellitus with diabetic polyneuropathy, unspecified long term insulin use status    Anxiety    Need for pneumococcal vaccine  -     Pneumococcal Polysaccharide Vaccine (23 Valent) (SQ/IM)    Seasonal allergic rhinitis, unspecified allergic rhinitis trigger  -     fluticasone (FLONASE) 50 mcg/actuation nasal spray; 1 spray by Each Nare route 2 (two) times daily.        Pt has been given instructions populated from [employer] database and has verbalized understanding of the after visit summary and information contained wherein.    Follow up with a primary care provider. May go to ER for acute shortness of  "breath, lightheadedness, fever, or any other emergent complaints or changes in condition.    "This note will be shared with the patient"    The [employer] medical Dictation software was used during the dictation of portions or the entirety of this medical record.  Phonetic or grammatic errors may exist due to the use of this software. For clarification, refer to the author of the document.          "

## 2017-03-03 NOTE — MR AVS SNAPSHOT
[facility]  [address]  [place] [address]  Phone: [phone]  Fax: [phone]                  Cortes Schroeder   3/3/2017 8:00 AM   Office Visit    Description:  Male : 1960   Provider:  Hira Acosta MD   Department:  [facility]           Reason for Visit     Hypertension     Diabetes     Spasms           Diagnoses this Visit        Comments    Hypertension, uncontrolled    -  Primary     Uncontrolled type 2 diabetes mellitus with diabetic polyneuropathy, unspecified long term insulin use status         Anxiety         Need for pneumococcal vaccine                To Do List           Future Appointments        Provider Department Dept Phone    3/27/2017 3:30 PM DEEPTHI Villegas MD [facility] [phone]      Goals (5 Years of Data)     None      Follow-Up and Disposition     Return in about 4 weeks (around 3/31/2017) for high blood pressure, diabetes.       These Medications        Disp Refills Start End    lisinopril (PRINIVIL,ZESTRIL) 5 MG tablet 90 tablet 0 3/3/2017     Take 1 tablet (5 mg total) by mouth once daily. - Oral    Pharmacy: [facility] [address] [place] [address] AT [facility] #: [phone]         [facility] On Call     [facility] On Call Nurse Care Line -  Assistance  Registered nurses in the [facility] On Call Center provide clinical advisement, health education, appointment booking, and other advisory services.  Call for this free service at [phone].             Medications           Message regarding Medications     Verify the changes and/or additions to your medication regime listed below are the same as discussed with your clinician today.  If any of these changes or additions are incorrect, please notify your healthcare provider.        START taking these NEW medications        Refills    lisinopril (PRINIVIL,ZESTRIL) 5 MG tablet 0    Sig: Take 1 tablet (5 mg total) by  "mouth once daily.    Class: Normal    Route: Oral      STOP taking these medications     FREESTYLE LANCETS 28 gauge lancets USE 1 NEEDLE 4 TIMES A DAY    meloxicam (MOBIC) 15 MG tablet Take 1 tablet (15 mg total) by mouth once daily.    tramadol (ULTRAM) 50 mg tablet Take 50 mg by mouth 2 (two) times daily.            Verify that the below list of medications is an accurate representation of the medications you are currently taking.  If none reported, the list may be blank. If incorrect, please contact your healthcare provider. Carry this list with you in case of emergency.           Current Medications     aspirin (ECOTRIN) 81 MG EC tablet Take 1 tablet (81 mg total) by mouth once daily.    BD ULTRA-FINE DANNY PEN NEEDLES 32 gauge x 5/32" Ndle Inject 1 each into the skin 4 (four) times daily before meals and nightly.    blood sugar diagnostic Strp 1 strip by Misc.(Non-Drug; Combo Route) route 3 (three) times daily.    clotrimazole-betamethasone 1-0.05% (LOTRISONE) cream Apply topically 2 (two) times daily.    fish oil-omega-3 fatty acids 300-1,000 mg capsule Take by mouth 2 (two) times daily.    fluconazole (DIFLUCAN) 100 MG tablet Take 1 tablet (100 mg total) by mouth once daily.    fluoxetine (PROZAC) 40 MG capsule Take 1 capsule (40 mg total) by mouth 2 (two) times daily.    gabapentin (NEURONTIN) 100 MG capsule Take 1 capsule (100 mg total) by mouth once daily.    hydrochlorothiazide (HYDRODIURIL) 12.5 MG Tab Take 1 tablet (12.5 mg total) by mouth once daily.    magnesium oxide-Mg AA chelate (MG-PLUS-PROTEIN) 133 mg Tab Take 500 mg by mouth every evening.     NOVOLOG FLEXPEN 100 unit/mL InPn pen Inject 22 Units into the skin 3 (three) times daily with meals.    pantoprazole (PROTONIX) 40 MG tablet Take 1 tablet (40 mg total) by mouth once daily.    pravastatin (PRAVACHOL) 80 MG tablet Take 1 tablet (80 mg total) by mouth once daily.    tamsulosin (FLOMAX) 0.4 mg Cp24 Take 1 capsule (0.4 mg total) by mouth once " "daily.    TOUJEO SOLOSTAR 300 unit/mL (1.5 mL) InPn pen Inject 70 Units into the skin every evening.    trazodone (DESYREL) 50 MG tablet Take 1 tablet (50 mg total) by mouth every evening.    vitamin D 1000 units Tab Take 185 mg by mouth 2 (two) times daily.     lisinopril (PRINIVIL,ZESTRIL) 5 MG tablet Take 1 tablet (5 mg total) by mouth once daily.    pep injection Inject 0.5 ml as directed     For compounding pharmacy use:   Add PAPAVERINE 30 mcg  Add PHENTOLAMINE 10 mg  Add ALPROSTADIL 100 mcg    syringe, disposable, 1 mL Syrg Use as directed           Clinical Reference Information           Your Vitals Were     BP Pulse Temp Resp Height Weight    140/82 (BP Location: Right arm, Patient Position: Sitting, BP Method: Manual) 81 98 °F (36.7 °C) (Oral) 16 5' 5" (1.651 m) 88.7 kg (195 lb 8.8 oz)    SpO2 BMI             97% 32.54 kg/m2         Blood Pressure          Most Recent Value    BP  (!)  140/82      Allergies as of 3/3/2017     Invokana [Canagliflozin]    Percocet [Oxycodone-acetaminophen]    Biaxin [Clarithromycin]    Hydrocodone    Sulfa (Sulfonamide Antibiotics)      Immunizations Administered on Date of Encounter - 3/3/2017     Name Date Dose VIS Date Route    Pneumococcal Polysaccharide - 23 Valent  Incomplete 0.5 mL 4/24/2015 Intramuscular      Orders Placed During Today's Visit      Normal Orders This Visit    Pneumococcal Polysaccharide Vaccine (23 Valent) (SQ/IM)       Language Assistance Services     ATTENTION: Language assistance services are available, free of charge. Please call [phone].      ATENCIÓN: Si chinmay gibson, tiene a cummings disposición servicios gratuitos de asistencia lingüística. Llame al [phone].     JAY JAY Ý: N?u b?n nói Ti?ng Vi?t, có các d?ch v? h? tr? ngôn ng? mi?n phí dành cho b?n. G?i s? [phone].         [facility] complies with applicable Federal civil rights laws and does not discriminate on the basis of race, color, national origin, age, " disability, or sex.

## 2017-03-03 NOTE — PROGRESS NOTES
Pt tolerated pneumococcal 23 vaccine to left deltoid without difficulty; no adverse reaction noted; VIS given

## 2017-03-03 NOTE — PROGRESS NOTES
"Subjective:       Patient ID: Cortes Schroeder is a 57 y.o. male.    Chief Complaint: Hypertension (follow-up); Diabetes (follow-up); and Spasms (right leg, started last night)    HPI    Htn, uncontrolled - pt has checked his bP on occasion which are running 160s systolic. He does nto have a BP cuff for hoem use yet. He is adherent with hctz without side effect.     Dm2 - Pt has been checking his BG in the am and they have been running in the 400s this week. He is Seeing Dr Castle for endo, and he is tritrating up his toujeo 2 units at night.     Sleep disturbance with anxiety - he has a bit of a "hangover" sleepiness the next morning, but he has been sleeping well overall.      Ar - pt also complains of an int dry cough a/w pnd x 3 weeks that is unchanged since the onset. No aggravating or alleviating factors.       Current Outpatient Prescriptions on File Prior to Visit   Medication Sig Dispense Refill    aspirin (ECOTRIN) 81 MG EC tablet Take 1 tablet (81 mg total) by mouth once daily.  0    BD ULTRA-FINE DANNY PEN NEEDLES 32 gauge x 5/32" Ndle Inject 1 each into the skin 4 (four) times daily before meals and nightly. 120 each 11    blood sugar diagnostic Strp 1 strip by Misc.(Non-Drug; Combo Route) route 3 (three) times daily. 300 strip 1    clotrimazole-betamethasone 1-0.05% (LOTRISONE) cream Apply topically 2 (two) times daily. 15 g 5    fish oil-omega-3 fatty acids 300-1,000 mg capsule Take by mouth 2 (two) times daily.      fluconazole (DIFLUCAN) 100 MG tablet Take 1 tablet (100 mg total) by mouth once daily. 10 tablet 0    fluoxetine (PROZAC) 40 MG capsule Take 1 capsule (40 mg total) by mouth 2 (two) times daily. 180 capsule 3    gabapentin (NEURONTIN) 100 MG capsule Take 1 capsule (100 mg total) by mouth once daily. 90 capsule 3    hydrochlorothiazide (HYDRODIURIL) 12.5 MG Tab Take 1 tablet (12.5 mg total) by mouth once daily. 90 tablet 2    magnesium oxide-Mg AA chelate (MG-PLUS-PROTEIN) 133 mg " Tab Take 500 mg by mouth every evening.       NOVOLOG FLEXPEN 100 unit/mL InPn pen Inject 22 Units into the skin 3 (three) times daily with meals. 59.4 mL 3    pantoprazole (PROTONIX) 40 MG tablet Take 1 tablet (40 mg total) by mouth once daily. 90 tablet 0    pravastatin (PRAVACHOL) 80 MG tablet Take 1 tablet (80 mg total) by mouth once daily. 90 tablet 3    tamsulosin (FLOMAX) 0.4 mg Cp24 Take 1 capsule (0.4 mg total) by mouth once daily. 30 capsule 11    TOUJEO SOLOSTAR 300 unit/mL (1.5 mL) InPn pen Inject 70 Units into the skin every evening. 21 mL 3    trazodone (DESYREL) 50 MG tablet Take 1 tablet (50 mg total) by mouth every evening. 30 tablet 11    vitamin D 1000 units Tab Take 185 mg by mouth 2 (two) times daily.       pep injection Inject 0.5 ml as directed     For compounding pharmacy use:   Add PAPAVERINE 30 mcg  Add PHENTOLAMINE 10 mg  Add ALPROSTADIL 100 mcg 1 vial 0    syringe, disposable, 1 mL Syrg Use as directed 100 Syringe 11    [DISCONTINUED] FREESTYLE LANCETS 28 gauge lancets USE 1 NEEDLE 4 TIMES A DAY  5    [DISCONTINUED] meloxicam (MOBIC) 15 MG tablet Take 1 tablet (15 mg total) by mouth once daily. 90 tablet 0    [DISCONTINUED] tramadol (ULTRAM) 50 mg tablet Take 50 mg by mouth 2 (two) times daily.        No current facility-administered medications on file prior to visit.        Review of Systems   Constitutional: Negative for chills and fever.   HENT: Positive for postnasal drip, rhinorrhea and sneezing.    Respiratory: Positive for cough. Negative for shortness of breath.        Objective:     Vitals:    03/03/17 0753   BP: (!) 140/82   Pulse: 81   Resp: 16   Temp: 98 °F (36.7 °C)        Physical Exam   Constitutional: He appears well-developed. No distress.   Obese   HENT:   Head: Normocephalic and atraumatic.   Eyes: Conjunctivae are normal. No scleral icterus.   Pulmonary/Chest: Effort normal.   Neurological: He is alert.   Psychiatric: He has a normal mood and affect. His  behavior is normal.   Nursing note and vitals reviewed.      Assessment:       1. Hypertension, uncontrolled    2. Uncontrolled type 2 diabetes mellitus with diabetic polyneuropathy, unspecified long term insulin use status    3. Anxiety    4. Need for pneumococcal vaccine    5. Seasonal allergic rhinitis, unspecified allergic rhinitis trigger        Plan:       Cortes was seen today for hypertension, diabetes and spasms.    Diagnoses and all orders for this visit:    Hypertension, uncontrolled  Pts blood pressure is uncontrolled. I advised the following lifestyle changes:  - exercise for 20 mins x 3-5 a week per AHA recommendations  - weight reduction if overweight by calorie restriction  - increase consumption of fruit/vegetables to 5 or more servings a day        - reduce sodium intake    At this stage, we discussed that their risk for MI and CVA is too high with their current BP, and will adjust their medications by adding lisinopril         Pt asked to keep BP log with date/BP, taking BP at least 4 x a week. They will bring this with them to their next appointment.     Pt asked to call me if BPs consistently above 140/90.    Pts questions were answered.    The 10-year CVD risk score (D'Agostino, et al., 2008) is: 33.1%    Values used to calculate the score:      Age: 57 years      Sex: Male      Diabetic: Yes      Tobacco smoker: No      Systolic Blood Pressure: 140 mmHg      Is BP treated: Yes      HDL Cholesterol: 48 mg/dL      Total Cholesterol: 218 mg/dL    Uncontrolled type 2 diabetes mellitus with diabetic polyneuropathy, unspecified long term insulin use status  Pt to continue gentle titration of toujeo 2 units at a time.     Anxiety  Pt is doing well with his trazadone, except for some grogginess the following day. Will add     Need for pneumococcal vaccine  -     Pneumococcal Polysaccharide Vaccine (23 Valent) (SQ/IM)            Return in about 4 weeks (around 3/31/2017) for high blood pressure,  diabetes.        Pt verbalized understanding and agreed with our plan.

## 2017-03-22 ENCOUNTER — PATIENT MESSAGE (OUTPATIENT)
Dept: FAMILY MEDICINE | Facility: CLINIC | Age: 57
End: 2017-03-22

## 2017-03-27 ENCOUNTER — OFFICE VISIT (OUTPATIENT)
Dept: UROLOGY | Facility: CLINIC | Age: 57
End: 2017-03-27
Payer: COMMERCIAL

## 2017-03-27 VITALS — WEIGHT: 198.88 LBS | BODY MASS INDEX: 33.13 KG/M2 | HEIGHT: 65 IN

## 2017-03-27 DIAGNOSIS — N23 RENAL COLIC ON RIGHT SIDE: ICD-10-CM

## 2017-03-27 DIAGNOSIS — N13.2 HYDRONEPHROSIS WITH URINARY OBSTRUCTION DUE TO URETERAL CALCULUS: ICD-10-CM

## 2017-03-27 DIAGNOSIS — N20.1 URETERAL STONE: Primary | ICD-10-CM

## 2017-03-27 LAB
BILIRUB SERPL-MCNC: NORMAL MG/DL
BLOOD URINE, POC: POSITIVE
COLOR, POC UA: YELLOW
GLUCOSE UR QL STRIP: POSITIVE
KETONES UR QL STRIP: NORMAL
LEUKOCYTE ESTERASE URINE, POC: NORMAL
NITRITE, POC UA: NORMAL
PH, POC UA: 6
PROTEIN, POC: NORMAL
SPECIFIC GRAVITY, POC UA: 1030
UROBILINOGEN, POC UA: NORMAL

## 2017-03-27 PROCEDURE — 87086 URINE CULTURE/COLONY COUNT: CPT

## 2017-03-27 PROCEDURE — 81001 URINALYSIS AUTO W/SCOPE: CPT | Mod: S$GLB,,, | Performed by: UROLOGY

## 2017-03-27 PROCEDURE — 99214 OFFICE O/P EST MOD 30 MIN: CPT | Mod: 25,S$GLB,, | Performed by: UROLOGY

## 2017-03-27 PROCEDURE — 99999 PR PBB SHADOW E&M-EST. PATIENT-LVL IV: CPT | Mod: PBBFAC,,, | Performed by: UROLOGY

## 2017-03-27 PROCEDURE — 1160F RVW MEDS BY RX/DR IN RCRD: CPT | Mod: S$GLB,,, | Performed by: UROLOGY

## 2017-03-27 NOTE — PROGRESS NOTES
Subjective:       Patient ID: Cortes Schroeder is a 57 y.o. male who was referred by No ref. provider found    Chief Complaint:   Chief Complaint   Patient presents with    Follow-up     one month f/u with ct scan      History of Kidney Stones  He has never required a procedure.  He passed a stone in 2003.  He had a stone in 2015 based on CT.  He had an ultrasound in October when he was having some of his abscess issues.  There was no stone or hydro at that time.  He had an ultrasound from [facility].  It shows Right hydronephrosis.  He is having some epigastric pain and some right upper quadrant pain.  He denies fever or hematuria.  He is back with a new CT.    Erectile Dysfunction  Patient complains of erectile dysfunction. Onset of dysfunction was several years ago and was gradual in onset.  Patient states the nature of difficulty is both attaining and maintaining erection. Full erections occur never. Partial erections occur never. Libido is not affected. Risk factors for ED include diabetes mellitus. Patient denies history of pelvic radiation. Previous treatment of ED includes Viagra and Cialis and Trimix.  He is willing to try Trimix again.    Benign Prostatic Hyperplasia  He patient reports frequency and nocturia two times a night. He denies straining, urgency and weak stream. The patient states symptoms are of mild severity. Onset of symptoms was several years ago and was gradual in onset.  He has no personal history and no family history of prostate cancer. He reports a history of kidney stones. He denies flank pain, gross hematuria and recurrent UTI.  He is currently taking no prostate medications.    Scrotal Abscess  He had an abscess of the scrotum that was treated in the ED in November 2016.  He was kept in the hospital for several days.  He has some pains at times but is mostly better.  He had a recurrence last month on the other side and it spontaneously drained and resolved.    ACTIVE MEDICAL  ISSUES:  Patient Active Problem List   Diagnosis    Hyperlipidemia    Anxiety    Back pain    Neuropathy    DM (diabetes mellitus), type 2, uncontrolled    NPDR (nonproliferative diabetic retinopathy)    Insomnia    Gastroesophageal reflux disease without esophagitis    Hypertension, uncontrolled    Chronic pain syndrome    Scrotal abscess       PAST MEDICAL HISTORY  Past Medical History:   Diagnosis Date    Anxiety 12/18/2012    Back pain 12/18/2012    Cataract     Chronic pain syndrome 4/24/2016    Diabetes type 2, controlled 2/20/2016    Diabetic retinopathy     DM (diabetes mellitus) 12/18/2012    DM (diabetes mellitus), type 2, uncontrolled 11/16/2013    Essential hypertension 2/20/2016    Gastroesophageal reflux disease without esophagitis 2/20/2016    Hyperlipidemia 12/18/2012    Insomnia 8/7/2014    Neuropathy 11/16/2013       PAST SURGICAL HISTORY:  Past Surgical History:   Procedure Laterality Date    BACK SURGERY  2003       SOCIAL HISTORY:  Social History   Substance Use Topics    Smoking status: Never Smoker    Smokeless tobacco: Never Used    Alcohol use 0.6 oz/week     1 Glasses of wine per week      Comment: occasionally       FAMILY HISTORY:  Family History   Problem Relation Age of Onset    Cataracts Father     Amblyopia Neg Hx     Blindness Neg Hx     Glaucoma Neg Hx     Macular degeneration Neg Hx     Retinal detachment Neg Hx     Strabismus Neg Hx        ALLERGIES AND MEDICATIONS: updated and reviewed.  Review of patient's allergies indicates:   Allergen Reactions    Invokana [canagliflozin] Anaphylaxis    Percocet [oxycodone-acetaminophen] Nausea Only and Hallucinations    Biaxin [clarithromycin]     Hydrocodone Other (See Comments)     Dizzy/nausea/hallucinations    Sulfa (sulfonamide antibiotics) Nausea Only and Rash     Current Outpatient Prescriptions   Medication Sig    aspirin (ECOTRIN) 81 MG EC tablet Take 1 tablet (81 mg total) by mouth once  "daily.    BD ULTRA-FINE DANNY PEN NEEDLES 32 gauge x 5/32" Ndle Inject 1 each into the skin 4 (four) times daily before meals and nightly.    blood sugar diagnostic Strp 1 strip by Misc.(Non-Drug; Combo Route) route 3 (three) times daily.    clotrimazole-betamethasone 1-0.05% (LOTRISONE) cream Apply topically 2 (two) times daily.    fish oil-omega-3 fatty acids 300-1,000 mg capsule Take by mouth 2 (two) times daily.    fluoxetine (PROZAC) 40 MG capsule Take 1 capsule (40 mg total) by mouth 2 (two) times daily.    fluticasone (FLONASE) 50 mcg/actuation nasal spray 1 spray by Each Nare route 2 (two) times daily.    gabapentin (NEURONTIN) 100 MG capsule Take 1 capsule (100 mg total) by mouth once daily.    lisinopril (PRINIVIL,ZESTRIL) 5 MG tablet Take 1 tablet (5 mg total) by mouth once daily.    magnesium oxide-Mg AA chelate (MG-PLUS-PROTEIN) 133 mg Tab Take 500 mg by mouth every evening.     NOVOLOG FLEXPEN 100 unit/mL InPn pen Inject 22 Units into the skin 3 (three) times daily with meals.    pantoprazole (PROTONIX) 40 MG tablet Take 1 tablet (40 mg total) by mouth once daily.    pep injection Inject 0.5 ml as directed     For compounding pharmacy use:   Add PAPAVERINE 30 mcg  Add PHENTOLAMINE 10 mg  Add ALPROSTADIL 100 mcg    pravastatin (PRAVACHOL) 80 MG tablet Take 1 tablet (80 mg total) by mouth once daily.    syringe, disposable, 1 mL Syrg Use as directed    TOUJEO SOLOSTAR 300 unit/mL (1.5 mL) InPn pen Inject 70 Units into the skin every evening.    trazodone (DESYREL) 50 MG tablet Take 1 tablet (50 mg total) by mouth every evening.    vitamin D 1000 units Tab Take 185 mg by mouth 2 (two) times daily.     tamsulosin (FLOMAX) 0.4 mg Cp24 Take 1 capsule (0.4 mg total) by mouth once daily.     No current facility-administered medications for this visit.        Review of Systems   Constitutional: Negative for activity change, fatigue, fever and unexpected weight change.   HENT: Negative for " "congestion.    Eyes: Negative for redness.   Respiratory: Negative for chest tightness and shortness of breath.    Cardiovascular: Negative for chest pain and leg swelling.   Gastrointestinal: Negative for abdominal pain, constipation, diarrhea, nausea and vomiting.   Genitourinary: Negative for dysuria, flank pain, frequency, hematuria, penile pain, penile swelling, scrotal swelling, testicular pain and urgency.   Musculoskeletal: Negative for arthralgias and back pain.   Neurological: Negative for dizziness and light-headedness.   Psychiatric/Behavioral: Negative for behavioral problems and confusion. The patient is not nervous/anxious.    All other systems reviewed and are negative.      Objective:      Vitals:    03/27/17 1518   Weight: 90.2 kg (198 lb 13.7 oz)   Height: 5' 5" (1.651 m)     Physical Exam   Nursing note and vitals reviewed.  Constitutional: He is oriented to person, place, and time. He appears well-developed and well-nourished.   HENT:   Head: Normocephalic.   Eyes: Conjunctivae are normal.   Neck: Normal range of motion. Neck supple. No tracheal deviation present. No thyromegaly present.   Cardiovascular: Normal rate and normal heart sounds.    Pulmonary/Chest: Effort normal and breath sounds normal. No respiratory distress. He has no wheezes.   Abdominal: Soft. Bowel sounds are normal. There is no hepatosplenomegaly. There is no tenderness. There is no rebound and no CVA tenderness. No hernia.   Musculoskeletal: Normal range of motion. He exhibits no edema or tenderness.   Lymphadenopathy:     He has no cervical adenopathy.   Neurological: He is alert and oriented to person, place, and time.   Skin: Skin is warm and dry. No rash noted. No erythema.     Psychiatric: He has a normal mood and affect. His behavior is normal. Judgment and thought content normal.       Urine dipstick shows negative for all components.  Micro exam: negative for WBC's or RBC's.    Assessment:       1. Ureteral stone  "   2. Hydronephrosis with urinary obstruction due to ureteral calculus    3. Renal colic on right side          Plan:       1. Ureteral stone  Cystoscopy with right ureteroscopy on Wednesday 4/5/2017    - POCT urinalysis, dipstick or tablet reag  - Urine culture    2. Hydronephrosis with urinary obstruction due to ureteral calculus  As above    3. Renal colic on right side  Tylenol for now, he is managing             Return in about 4 weeks (around 4/24/2017) for Follow up.

## 2017-03-27 NOTE — MR AVS SNAPSHOT
"    [facility]  [address]  [place] [address]  Phone: [phone]                  Cortes Schroeder   3/27/2017 3:30 PM   Office Visit    Description:  Male : 1960   Provider:  DEEPTHI Villegas MD   Department:  [facility]           Reason for Visit     Follow-up           Diagnoses this Visit        Comments    Ureteral stone    -  Primary     Hydronephrosis with urinary obstruction due to ureteral calculus         Renal colic on right side                To Do List           Future Appointments        Provider Department Dept Phone    3/31/2017 4:00 PM Hira Acosta MD [facility] [phone]      Goals (5 Years of Data)     None      Follow-Up and Disposition     Return in about 4 weeks (around 2017) for Follow up.      [facility]     [facility] -  Assistance  Registered nurses in the [facility] provide clinical advisement, health education, appointment booking, and other advisory services.  Call for this free service at [phone].             Medications           Message regarding Medications     Verify the changes and/or additions to your medication regime listed below are the same as discussed with your clinician today.  If any of these changes or additions are incorrect, please notify your healthcare provider.             Verify that the below list of medications is an accurate representation of the medications you are currently taking.  If none reported, the list may be blank. If incorrect, please contact your healthcare provider. Carry this list with you in case of emergency.           Current Medications     aspirin (ECOTRIN) 81 MG EC tablet Take 1 tablet (81 mg total) by mouth once daily.    BD ULTRA-FINE DANNY PEN NEEDLES 32 gauge x 5/32" Ndle Inject 1 each into the skin 4 (four) times daily before meals and nightly.    blood sugar diagnostic Strp 1 strip by Misc.(Non-Drug; Combo " "Route) route 3 (three) times daily.    clotrimazole-betamethasone 1-0.05% (LOTRISONE) cream Apply topically 2 (two) times daily.    fish oil-omega-3 fatty acids 300-1,000 mg capsule Take by mouth 2 (two) times daily.    fluoxetine (PROZAC) 40 MG capsule Take 1 capsule (40 mg total) by mouth 2 (two) times daily.    fluticasone (FLONASE) 50 mcg/actuation nasal spray 1 spray by Each Nare route 2 (two) times daily.    gabapentin (NEURONTIN) 100 MG capsule Take 1 capsule (100 mg total) by mouth once daily.    lisinopril (PRINIVIL,ZESTRIL) 5 MG tablet Take 1 tablet (5 mg total) by mouth once daily.    magnesium oxide-Mg AA chelate (MG-PLUS-PROTEIN) 133 mg Tab Take 500 mg by mouth every evening.     NOVOLOG FLEXPEN 100 unit/mL InPn pen Inject 22 Units into the skin 3 (three) times daily with meals.    pantoprazole (PROTONIX) 40 MG tablet Take 1 tablet (40 mg total) by mouth once daily.    pep injection Inject 0.5 ml as directed     For compounding pharmacy use:   Add PAPAVERINE 30 mcg  Add PHENTOLAMINE 10 mg  Add ALPROSTADIL 100 mcg    pravastatin (PRAVACHOL) 80 MG tablet Take 1 tablet (80 mg total) by mouth once daily.    syringe, disposable, 1 mL Syrg Use as directed    TOUJEO SOLOSTAR 300 unit/mL (1.5 mL) InPn pen Inject 70 Units into the skin every evening.    trazodone (DESYREL) 50 MG tablet Take 1 tablet (50 mg total) by mouth every evening.    vitamin D 1000 units Tab Take 185 mg by mouth 2 (two) times daily.     tamsulosin (FLOMAX) 0.4 mg Cp24 Take 1 capsule (0.4 mg total) by mouth once daily.           Clinical Reference Information           Your Vitals Were     Height Weight BMI          5' 5" (1.651 m) 90.2 kg (198 lb 13.7 oz) 33.09 kg/m2        Allergies as of 3/27/2017     Invokana [Canagliflozin]    Percocet [Oxycodone-acetaminophen]    Biaxin [Clarithromycin]    Hydrocodone    Sulfa (Sulfonamide Antibiotics)      Immunizations Administered on Date of Encounter - 3/27/2017     None      Orders Placed During " Today's Visit      Normal Orders This Visit    Case Request Operating Room: CYSTOSCOPY, RETROGRADE, URETEROSCOPY, STENT PLACEMENT     POCT urinalysis, dipstick or tablet reag     Urine culture     Future Labs/Procedures Expected by Expires    Basic metabolic panel  3/27/2017 5/26/2018    CBC auto differential  3/27/2017 5/26/2018      Language Assistance Services     ATTENTION: Language assistance services are available, free of charge. Please call [phone].      ATENCIÓN: Si habla español, tiene a cummings disposición servicios gratuitos de asistencia lingüística. Llame al [phone].     CHÚ Ý: N?u b?n nói Ti?ng Vi?t, có các d?ch v? h? tr? ngôn ng? mi?n phí dành cho b?n. G?i s? [phone].         [facility] complies with applicable Federal civil rights laws and does not discriminate on the basis of race, color, national origin, age, disability, or sex.

## 2017-03-27 NOTE — H&P
Subjective:       Patient ID: Cortes Schroeder is a 57 y.o. male who was referred by No ref. provider found    Chief Complaint:   Chief Complaint   Patient presents with    Follow-up     one month f/u with ct scan      History of Kidney Stones  He has never required a procedure.  He passed a stone in 2003.  He had a stone in 2015 based on CT.  He had an ultrasound in October when he was having some of his abscess issues.  There was no stone or hydro at that time.  He had an ultrasound from [facility].  It shows Right hydronephrosis.  He is having some epigastric pain and some right upper quadrant pain.  He denies fever or hematuria.  He is back with a new CT.    Erectile Dysfunction  Patient complains of erectile dysfunction. Onset of dysfunction was several years ago and was gradual in onset.  Patient states the nature of difficulty is both attaining and maintaining erection. Full erections occur never. Partial erections occur never. Libido is not affected. Risk factors for ED include diabetes mellitus. Patient denies history of pelvic radiation. Previous treatment of ED includes Viagra and Cialis and Trimix.  He is willing to try Trimix again.    Benign Prostatic Hyperplasia  He patient reports frequency and nocturia two times a night. He denies straining, urgency and weak stream. The patient states symptoms are of mild severity. Onset of symptoms was several years ago and was gradual in onset.  He has no personal history and no family history of prostate cancer. He reports a history of kidney stones. He denies flank pain, gross hematuria and recurrent UTI.  He is currently taking no prostate medications.    Scrotal Abscess  He had an abscess of the scrotum that was treated in the ED in November 2016.  He was kept in the hospital for several days.  He has some pains at times but is mostly better.  He had a recurrence last month on the other side and it spontaneously drained and resolved.    ACTIVE MEDICAL  ISSUES:  Patient Active Problem List   Diagnosis    Hyperlipidemia    Anxiety    Back pain    Neuropathy    DM (diabetes mellitus), type 2, uncontrolled    NPDR (nonproliferative diabetic retinopathy)    Insomnia    Gastroesophageal reflux disease without esophagitis    Hypertension, uncontrolled    Chronic pain syndrome    Scrotal abscess       PAST MEDICAL HISTORY  Past Medical History:   Diagnosis Date    Anxiety 12/18/2012    Back pain 12/18/2012    Cataract     Chronic pain syndrome 4/24/2016    Diabetes type 2, controlled 2/20/2016    Diabetic retinopathy     DM (diabetes mellitus) 12/18/2012    DM (diabetes mellitus), type 2, uncontrolled 11/16/2013    Essential hypertension 2/20/2016    Gastroesophageal reflux disease without esophagitis 2/20/2016    Hyperlipidemia 12/18/2012    Insomnia 8/7/2014    Neuropathy 11/16/2013       PAST SURGICAL HISTORY:  Past Surgical History:   Procedure Laterality Date    BACK SURGERY  2003       SOCIAL HISTORY:  Social History   Substance Use Topics    Smoking status: Never Smoker    Smokeless tobacco: Never Used    Alcohol use 0.6 oz/week     1 Glasses of wine per week      Comment: occasionally       FAMILY HISTORY:  Family History   Problem Relation Age of Onset    Cataracts Father     Amblyopia Neg Hx     Blindness Neg Hx     Glaucoma Neg Hx     Macular degeneration Neg Hx     Retinal detachment Neg Hx     Strabismus Neg Hx        ALLERGIES AND MEDICATIONS: updated and reviewed.  Review of patient's allergies indicates:   Allergen Reactions    Invokana [canagliflozin] Anaphylaxis    Percocet [oxycodone-acetaminophen] Nausea Only and Hallucinations    Biaxin [clarithromycin]     Hydrocodone Other (See Comments)     Dizzy/nausea/hallucinations    Sulfa (sulfonamide antibiotics) Nausea Only and Rash     Current Outpatient Prescriptions   Medication Sig    aspirin (ECOTRIN) 81 MG EC tablet Take 1 tablet (81 mg total) by mouth once  "daily.    BD ULTRA-FINE DANNY PEN NEEDLES 32 gauge x 5/32" Ndle Inject 1 each into the skin 4 (four) times daily before meals and nightly.    blood sugar diagnostic Strp 1 strip by Misc.(Non-Drug; Combo Route) route 3 (three) times daily.    clotrimazole-betamethasone 1-0.05% (LOTRISONE) cream Apply topically 2 (two) times daily.    fish oil-omega-3 fatty acids 300-1,000 mg capsule Take by mouth 2 (two) times daily.    fluoxetine (PROZAC) 40 MG capsule Take 1 capsule (40 mg total) by mouth 2 (two) times daily.    fluticasone (FLONASE) 50 mcg/actuation nasal spray 1 spray by Each Nare route 2 (two) times daily.    gabapentin (NEURONTIN) 100 MG capsule Take 1 capsule (100 mg total) by mouth once daily.    lisinopril (PRINIVIL,ZESTRIL) 5 MG tablet Take 1 tablet (5 mg total) by mouth once daily.    magnesium oxide-Mg AA chelate (MG-PLUS-PROTEIN) 133 mg Tab Take 500 mg by mouth every evening.     NOVOLOG FLEXPEN 100 unit/mL InPn pen Inject 22 Units into the skin 3 (three) times daily with meals.    pantoprazole (PROTONIX) 40 MG tablet Take 1 tablet (40 mg total) by mouth once daily.    pep injection Inject 0.5 ml as directed     For compounding pharmacy use:   Add PAPAVERINE 30 mcg  Add PHENTOLAMINE 10 mg  Add ALPROSTADIL 100 mcg    pravastatin (PRAVACHOL) 80 MG tablet Take 1 tablet (80 mg total) by mouth once daily.    syringe, disposable, 1 mL Syrg Use as directed    TOUJEO SOLOSTAR 300 unit/mL (1.5 mL) InPn pen Inject 70 Units into the skin every evening.    trazodone (DESYREL) 50 MG tablet Take 1 tablet (50 mg total) by mouth every evening.    vitamin D 1000 units Tab Take 185 mg by mouth 2 (two) times daily.     tamsulosin (FLOMAX) 0.4 mg Cp24 Take 1 capsule (0.4 mg total) by mouth once daily.     No current facility-administered medications for this visit.        Review of Systems   Constitutional: Negative for activity change, fatigue, fever and unexpected weight change.   HENT: Negative for " "congestion.    Eyes: Negative for redness.   Respiratory: Negative for chest tightness and shortness of breath.    Cardiovascular: Negative for chest pain and leg swelling.   Gastrointestinal: Negative for abdominal pain, constipation, diarrhea, nausea and vomiting.   Genitourinary: Negative for dysuria, flank pain, frequency, hematuria, penile pain, penile swelling, scrotal swelling, testicular pain and urgency.   Musculoskeletal: Negative for arthralgias and back pain.   Neurological: Negative for dizziness and light-headedness.   Psychiatric/Behavioral: Negative for behavioral problems and confusion. The patient is not nervous/anxious.    All other systems reviewed and are negative.      Objective:      Vitals:    03/27/17 1518   Weight: 90.2 kg (198 lb 13.7 oz)   Height: 5' 5" (1.651 m)     Physical Exam   Nursing note and vitals reviewed.  Constitutional: He is oriented to person, place, and time. He appears well-developed and well-nourished.   HENT:   Head: Normocephalic.   Eyes: Conjunctivae are normal.   Neck: Normal range of motion. Neck supple. No tracheal deviation present. No thyromegaly present.   Cardiovascular: Normal rate and normal heart sounds.    Pulmonary/Chest: Effort normal and breath sounds normal. No respiratory distress. He has no wheezes.   Abdominal: Soft. Bowel sounds are normal. There is no hepatosplenomegaly. There is no tenderness. There is no rebound and no CVA tenderness. No hernia.   Musculoskeletal: Normal range of motion. He exhibits no edema or tenderness.   Lymphadenopathy:     He has no cervical adenopathy.   Neurological: He is alert and oriented to person, place, and time.   Skin: Skin is warm and dry. No rash noted. No erythema.     Psychiatric: He has a normal mood and affect. His behavior is normal. Judgment and thought content normal.       Urine dipstick shows negative for all components.  Micro exam: negative for WBC's or RBC's.    Assessment:       1. Ureteral stone  "   2. Hydronephrosis with urinary obstruction due to ureteral calculus    3. Renal colic on right side          Plan:       1. Ureteral stone  Cystoscopy with right ureteroscopy on Wednesday 4/5/2017    - POCT urinalysis, dipstick or tablet reag  - Urine culture    2. Hydronephrosis with urinary obstruction due to ureteral calculus  As above    3. Renal colic on right side  Tylenol for now, he is managing             Return in about 4 weeks (around 4/24/2017) for Follow up.

## 2017-03-29 LAB — BACTERIA UR CULT: NORMAL

## 2017-03-31 ENCOUNTER — OFFICE VISIT (OUTPATIENT)
Dept: FAMILY MEDICINE | Facility: CLINIC | Age: 57
End: 2017-03-31
Payer: COMMERCIAL

## 2017-03-31 ENCOUNTER — TELEPHONE (OUTPATIENT)
Dept: UROLOGY | Facility: CLINIC | Age: 57
End: 2017-03-31

## 2017-03-31 ENCOUNTER — PATIENT MESSAGE (OUTPATIENT)
Dept: UROLOGY | Facility: CLINIC | Age: 57
End: 2017-03-31

## 2017-03-31 VITALS
TEMPERATURE: 98 F | HEIGHT: 65 IN | WEIGHT: 199.31 LBS | HEART RATE: 83 BPM | RESPIRATION RATE: 16 BRPM | SYSTOLIC BLOOD PRESSURE: 160 MMHG | OXYGEN SATURATION: 97 % | DIASTOLIC BLOOD PRESSURE: 80 MMHG | BODY MASS INDEX: 33.21 KG/M2

## 2017-03-31 DIAGNOSIS — R05.3 PERSISTENT COUGH: Primary | ICD-10-CM

## 2017-03-31 DIAGNOSIS — I10 HYPERTENSION, UNCONTROLLED: ICD-10-CM

## 2017-03-31 DIAGNOSIS — E78.2 MIXED HYPERLIPIDEMIA: ICD-10-CM

## 2017-03-31 DIAGNOSIS — N20.0 KIDNEY STONE: Primary | ICD-10-CM

## 2017-03-31 DIAGNOSIS — E11.42 DIABETIC PERIPHERAL NEUROPATHY ASSOCIATED WITH TYPE 2 DIABETES MELLITUS: ICD-10-CM

## 2017-03-31 PROCEDURE — 4010F ACE/ARB THERAPY RXD/TAKEN: CPT | Mod: S$GLB,,, | Performed by: FAMILY MEDICINE

## 2017-03-31 PROCEDURE — 3077F SYST BP >= 140 MM HG: CPT | Mod: S$GLB,,, | Performed by: FAMILY MEDICINE

## 2017-03-31 PROCEDURE — 3079F DIAST BP 80-89 MM HG: CPT | Mod: S$GLB,,, | Performed by: FAMILY MEDICINE

## 2017-03-31 PROCEDURE — 1160F RVW MEDS BY RX/DR IN RCRD: CPT | Mod: S$GLB,,, | Performed by: FAMILY MEDICINE

## 2017-03-31 PROCEDURE — 3046F HEMOGLOBIN A1C LEVEL >9.0%: CPT | Mod: S$GLB,,, | Performed by: FAMILY MEDICINE

## 2017-03-31 PROCEDURE — 99215 OFFICE O/P EST HI 40 MIN: CPT | Mod: S$GLB,,, | Performed by: FAMILY MEDICINE

## 2017-03-31 PROCEDURE — 99999 PR PBB SHADOW E&M-EST. PATIENT-LVL III: CPT | Mod: PBBFAC,,, | Performed by: FAMILY MEDICINE

## 2017-03-31 RX ORDER — PROMETHAZINE HYDROCHLORIDE AND DEXTROMETHORPHAN HYDROBROMIDE 6.25; 15 MG/5ML; MG/5ML
5 SYRUP ORAL NIGHTLY PRN
Qty: 120 ML | Refills: 0 | Status: SHIPPED | OUTPATIENT
Start: 2017-03-31 | End: 2018-04-19

## 2017-03-31 RX ORDER — INSULIN ASPART 100 [IU]/ML
25 INJECTION, SOLUTION INTRAVENOUS; SUBCUTANEOUS
Qty: 67.5 ML | Refills: 3
Start: 2017-03-31 | End: 2017-10-18

## 2017-03-31 RX ORDER — PRAVASTATIN SODIUM 80 MG/1
40 TABLET ORAL DAILY
Qty: 90 TABLET | Refills: 1 | Status: SHIPPED | OUTPATIENT
Start: 2017-03-31 | End: 2017-07-10 | Stop reason: SDUPTHER

## 2017-03-31 RX ORDER — LOSARTAN POTASSIUM 50 MG/1
50 TABLET ORAL DAILY
Qty: 90 TABLET | Refills: 3 | Status: SHIPPED | OUTPATIENT
Start: 2017-03-31 | End: 2018-01-08 | Stop reason: SDUPTHER

## 2017-03-31 RX ORDER — INSULIN GLARGINE 300 U/ML
72 INJECTION, SOLUTION SUBCUTANEOUS NIGHTLY
Qty: 21.6 ML | Refills: 3
Start: 2017-03-31 | End: 2017-10-18

## 2017-03-31 NOTE — PROGRESS NOTES
"Subjective:       Patient ID: Cortes Schroeder is a 57 y.o. male.    Chief Complaint: Hypertension (follow up) and Diabetes (follow up )    HPI    Cough - onset of int strong coughing spells 4 weeks ago, which he attributes to medications.     Htn - uncontrolled - pt was taken off lisinopril by another doctor as he felt that it was contributing to his cough. His cough has not improved.   BP range 140-170/80-90    HLD - Pt has cut pravastatin to 40mg because he felt like he was having leg cramps at night.     AR - Pt stopped taking flonase due to nose bleeds and sneezing related to use, which he did not have after stopping flonase     DM2 - Pt is having fasting bg 350-400s and + despite toujeo 72 qhs and novolog 25 units. No ill effects from this such as dizziness, etc. He is becoming discouraged. His diet is ok, and is fairly consistent.      Of note - his partner is currently hospitalized and this taking quite a toll on him emotionally as well.       Current Outpatient Prescriptions on File Prior to Visit   Medication Sig Dispense Refill    aspirin (ECOTRIN) 81 MG EC tablet Take 1 tablet (81 mg total) by mouth once daily.  0    BD ULTRA-FINE DANNY PEN NEEDLES 32 gauge x 5/32" Ndle Inject 1 each into the skin 4 (four) times daily before meals and nightly. 120 each 11    blood sugar diagnostic Strp 1 strip by Misc.(Non-Drug; Combo Route) route 3 (three) times daily. 300 strip 1    clotrimazole-betamethasone 1-0.05% (LOTRISONE) cream Apply topically 2 (two) times daily. 15 g 5    fish oil-omega-3 fatty acids 300-1,000 mg capsule Take by mouth 2 (two) times daily.      fluoxetine (PROZAC) 40 MG capsule Take 1 capsule (40 mg total) by mouth 2 (two) times daily. 180 capsule 3    gabapentin (NEURONTIN) 100 MG capsule Take 1 capsule (100 mg total) by mouth once daily. 90 capsule 3    magnesium oxide-Mg AA chelate (MG-PLUS-PROTEIN) 133 mg Tab Take 500 mg by mouth every evening.       pantoprazole (PROTONIX) 40 " MG tablet Take 1 tablet (40 mg total) by mouth once daily. 90 tablet 0    syringe, disposable, 1 mL Syrg Use as directed 100 Syringe 11    vitamin D 1000 units Tab Take 185 mg by mouth 2 (two) times daily.       [DISCONTINUED] lisinopril (PRINIVIL,ZESTRIL) 5 MG tablet Take 1 tablet (5 mg total) by mouth once daily. 90 tablet 0    [DISCONTINUED] NOVOLOG FLEXPEN 100 unit/mL InPn pen Inject 22 Units into the skin 3 (three) times daily with meals. (Patient taking differently: Inject 25 Units into the skin 3 (three) times daily with meals. ) 59.4 mL 3    [DISCONTINUED] pravastatin (PRAVACHOL) 80 MG tablet Take 1 tablet (80 mg total) by mouth once daily. (Patient taking differently: Take 80 mg by mouth. Take half tablet daily) 90 tablet 3    [DISCONTINUED] TOUJEO SOLOSTAR 300 unit/mL (1.5 mL) InPn pen Inject 70 Units into the skin every evening. (Patient taking differently: Inject 72 Units into the skin every evening. ) 21 mL 3    [DISCONTINUED] trazodone (DESYREL) 50 MG tablet Take 1 tablet (50 mg total) by mouth every evening. 30 tablet 11    pep injection Inject 0.5 ml as directed     For compounding pharmacy use:   Add PAPAVERINE 30 mcg  Add PHENTOLAMINE 10 mg  Add ALPROSTADIL 100 mcg 1 vial 0    tamsulosin (FLOMAX) 0.4 mg Cp24 Take 1 capsule (0.4 mg total) by mouth once daily. 30 capsule 11    [DISCONTINUED] fluticasone (FLONASE) 50 mcg/actuation nasal spray 1 spray by Each Nare route 2 (two) times daily. 1 Bottle 3     No current facility-administered medications on file prior to visit.        Review of Systems   HENT: Negative for postnasal drip and rhinorrhea.    Respiratory: Positive for cough. Negative for chest tightness, shortness of breath and wheezing.        Objective:     Vitals:    03/31/17 1607   BP: (!) 160/80   Pulse: 83   Resp: 16   Temp: 98.4 °F (36.9 °C)        Physical Exam   Constitutional: He is oriented to person, place, and time. He appears well-developed. No distress.   HENT:   Head:  Normocephalic and atraumatic.   Right Ear: External ear normal.   Left Ear: External ear normal.   Eyes: Conjunctivae and EOM are normal. No scleral icterus.   Cardiovascular:   Pulses:       Dorsalis pedis pulses are 2+ on the right side, and 2+ on the left side.        Posterior tibial pulses are 2+ on the right side, and 2+ on the left side.   Pulmonary/Chest: Effort normal.   Musculoskeletal:        Right foot: There is no deformity.        Left foot: There is no deformity.   Feet:   Right Foot:   Protective Sensation: 7 sites tested. 8 sites sensed.   Skin Integrity: Positive for callus. Negative for ulcer, blister, skin breakdown, erythema, warmth or dry skin.   Left Foot:   Protective Sensation: 6 sites tested. 8 sites sensed.   Skin Integrity: Positive for callus. Negative for ulcer, blister, skin breakdown, erythema, warmth or dry skin.   Neurological: He is alert and oriented to person, place, and time.   Psychiatric: He has a normal mood and affect. His behavior is normal.   Nursing note and vitals reviewed.      Assessment:       1. Persistent cough    2. Mixed hyperlipidemia    3. Uncontrolled type 2 diabetes mellitus with diabetic polyneuropathy, with long-term current use of insulin    4. Diabetic peripheral neuropathy associated with type 2 diabetes mellitus    5. Hypertension, uncontrolled        Plan:       Cortes was seen today for hypertension and diabetes.    Diagnoses and all orders for this visit:    Persistent cough  -     promethazine-dextromethorphan (PROMETHAZINE-DM) 6.25-15 mg/5 mL Syrp; Take 5 mLs by mouth nightly as needed.  My best guess is that this cough is from AR --> PND. Will try nasacort since flonase was a/w negative side effects for him.     PFTs may be helpful, but pt states that he is too overwhelmed at this time with his partner being sick.     Mixed hyperlipidemia  -     pravastatin (PRAVACHOL) 80 MG tablet; Take 0.5 tablets (40 mg total) by mouth once daily. Take half  tablet daily.   Pt is tolerating 40mg without ill effects. I implored him to continue as he is at high risk of ASCVD.     Diabetic peripheral neuropathy associated with type 2 diabetes mellitus  -     NOVOLOG FLEXPEN 100 unit/mL InPn pen; Inject 25 Units into the skin 3 (three) times daily with meals.  -     TOUJEO SOLOSTAR 300 unit/mL (1.5 mL) InPn pen; Inject 72 Units into the skin every evening.    Pt to continue titration of toujeo of 2 units at a time with target am BG of 130 or less. Pt has good health literacy and should be able to accomplish this safely.              Return in about 4 weeks (around 4/28/2017) for Hypertension, Diabetes Type 2.      Pt verbalized understanding and agreed with our plan.     At least 45 minutes were spent today with the patient in the office, which more than 50% of the time was spent on evaluation and counseling regarding The primary encounter diagnosis was Persistent cough. Diagnoses of Mixed hyperlipidemia, Uncontrolled type 2 diabetes mellitus with diabetic polyneuropathy, with long-term current use of insulin, Diabetic peripheral neuropathy associated with type 2 diabetes mellitus, and Hypertension, uncontrolled were also pertinent to this visit..

## 2017-03-31 NOTE — TELEPHONE ENCOUNTER
----- Message from Katarina Aranda sent at 3/31/2017  8:49 AM CDT -----  Contact: SELF  Patient is calling to cancel surgery . He passed the kidney stone . I asked him to bring stone in for analysis .     LL

## 2017-03-31 NOTE — MR AVS SNAPSHOT
[facility]  [address]  [place] [address]  Phone: [phone]  Fax: [phone]                  Cortes Schroeder   3/31/2017 4:00 PM   Office Visit    Description:  Male : 1960   Provider:  Hira Acosta MD   Department:  [facility]           Reason for Visit     Hypertension     Diabetes           Diagnoses this Visit        Comments    Persistent cough    -  Primary     Mixed hyperlipidemia         Diabetic peripheral neuropathy associated with type 2 diabetes mellitus         Type II or unspecified type diabetes mellitus without mention of complication, not stated as uncontrolled                To Do List           Future Appointments        Provider Department Dept Phone    2017 3:15 PM DEEPTHI Villegas MD [facility] [place] [facility] [phone]      Your Future Surgeries/Procedures     2017   Surgery with DEEPTHI Villegas MD   [facility] [place] [facility]    [address] [place] [id number] [phone]              Goals (5 Years of Data)     None       These Medications        Disp Refills Start End    pravastatin (PRAVACHOL) 80 MG tablet 90 tablet 1 3/31/2017 3/31/2018    Take 0.5 tablets (40 mg total) by mouth once daily. Take half tablet daily - Oral    [place] [facility] [place] [facility] [address] Ph #: [phone]       losartan (COZAAR) 50 MG tablet 90 tablet 3 3/31/2017 3/31/2018    Take 1 tablet (50 mg total) by mouth once daily. - Oral    Pharmacy: [facility] - [place] [facility] Ph #: [phone]       NOVOLOG FLEXPEN 100 unit/mL InPn pen 67.5 mL 3 3/31/2017 3/31/2018    Inject 25 Units into the skin 3 (three) times daily with meals. - Subcutaneous    Pharmacy: [facility] [place]  [address] Ph #: [phone]       TOUJEO SOLOSTAR 300 unit/mL (1.5 mL) InPn pen 21.6 mL 3 3/31/2017  3/31/2018    Inject 72 Units into the skin every evening. - Subcutaneous    Pharmacy: [facility] [place]  [address] Ph #: [phone]       promethazine-dextromethorphan (PROMETHAZINE-DM) 6.25-15 mg/5 mL Syrp 120 mL 0 3/31/2017     Take 5 mLs by mouth nightly as needed. - Oral    Pharmacy: [facility] [place] - [address] Ph #: [phone]         [facility]     [facility] - 24/7 Assistance  Unless otherwise directed by your provider, please contact [facility], our nurse care line that is available for 24/7 assistance.     Registered nurses in the [facility] provide: appointment scheduling, clinical advisement, health education, and other advisory services.  Call: [phone] (toll free)               Medications           Message regarding Medications     Verify the changes and/or additions to your medication regime listed below are the same as discussed with your clinician today.  If any of these changes or additions are incorrect, please notify your healthcare provider.        START taking these NEW medications        Refills    losartan (COZAAR) 50 MG tablet 3    Sig: Take 1 tablet (50 mg total) by mouth once daily.    Class: Normal    Route: Oral    promethazine-dextromethorphan (PROMETHAZINE-DM) 6.25-15 mg/5 mL Syrp 0    Sig: Take 5 mLs by mouth nightly as needed.    Class: Normal    Route: Oral      CHANGE how you are taking these medications     Start Taking Instead of    pravastatin (PRAVACHOL) 80 MG tablet pravastatin (PRAVACHOL) 80 MG tablet    Dosage:  Take 0.5 tablets (40 mg total) by mouth once daily. Take half tablet daily Dosage:  Take 1 tablet (80 mg total) by mouth once daily.    Reason for Change:  Reorder     NOVOLOG FLEXPEN 100 unit/mL InPn pen NOVOLOG FLEXPEN 100 unit/mL InPn pen    Dosage:  Inject 25 Units into the skin 3 (three) times daily with meals. Dosage:  Inject 22 Units into  "the skin 3 (three) times daily with meals.    Reason for Change:  Reorder     TOUJEO SOLOSTAR 300 unit/mL (1.5 mL) InPn pen TOUJEO SOLOSTAR 300 unit/mL (1.5 mL) InPn pen    Dosage:  Inject 72 Units into the skin every evening. Dosage:  Inject 70 Units into the skin every evening.    Reason for Change:  Reorder       STOP taking these medications     lisinopril (PRINIVIL,ZESTRIL) 5 MG tablet Take 1 tablet (5 mg total) by mouth once daily.    trazodone (DESYREL) 50 MG tablet Take 1 tablet (50 mg total) by mouth every evening.    fluticasone (FLONASE) 50 mcg/actuation nasal spray 1 spray by Each Nare route 2 (two) times daily.           Verify that the below list of medications is an accurate representation of the medications you are currently taking.  If none reported, the list may be blank. If incorrect, please contact your healthcare provider. Carry this list with you in case of emergency.           Current Medications     aspirin (ECOTRIN) 81 MG EC tablet Take 1 tablet (81 mg total) by mouth once daily.    BD ULTRA-FINE DANNY PEN NEEDLES 32 gauge x 5/32" Ndle Inject 1 each into the skin 4 (four) times daily before meals and nightly.    blood sugar diagnostic Strp 1 strip by Misc.(Non-Drug; Combo Route) route 3 (three) times daily.    clotrimazole-betamethasone 1-0.05% (LOTRISONE) cream Apply topically 2 (two) times daily.    fish oil-omega-3 fatty acids 300-1,000 mg capsule Take by mouth 2 (two) times daily.    fluoxetine (PROZAC) 40 MG capsule Take 1 capsule (40 mg total) by mouth 2 (two) times daily.    gabapentin (NEURONTIN) 100 MG capsule Take 1 capsule (100 mg total) by mouth once daily.    magnesium oxide-Mg AA chelate (MG-PLUS-PROTEIN) 133 mg Tab Take 500 mg by mouth every evening.     NOVOLOG FLEXPEN 100 unit/mL InPn pen Inject 25 Units into the skin 3 (three) times daily with meals.    pantoprazole (PROTONIX) 40 MG tablet Take 1 tablet (40 mg total) by mouth once daily.    pravastatin (PRAVACHOL) 80 MG " "tablet Take 0.5 tablets (40 mg total) by mouth once daily. Take half tablet daily    syringe, disposable, 1 mL Syrg Use as directed    TOUJEO SOLOSTAR 300 unit/mL (1.5 mL) InPn pen Inject 72 Units into the skin every evening.    vitamin D 1000 units Tab Take 185 mg by mouth 2 (two) times daily.     losartan (COZAAR) 50 MG tablet Take 1 tablet (50 mg total) by mouth once daily.    pep injection Inject 0.5 ml as directed     For compounding pharmacy use:   Add PAPAVERINE 30 mcg  Add PHENTOLAMINE 10 mg  Add ALPROSTADIL 100 mcg    promethazine-dextromethorphan (PROMETHAZINE-DM) 6.25-15 mg/5 mL Syrp Take 5 mLs by mouth nightly as needed.    tamsulosin (FLOMAX) 0.4 mg Cp24 Take 1 capsule (0.4 mg total) by mouth once daily.           Clinical Reference Information           Your Vitals Were     BP Pulse Temp Resp Height Weight    160/80 (BP Location: Left arm, Patient Position: Sitting, BP Method: Manual) 83 98.4 °F (36.9 °C) (Oral) 16 5' 5" (1.651 m) 90.4 kg (199 lb 4.7 oz)    SpO2 BMI             97% 33.16 kg/m2         Blood Pressure          Most Recent Value    BP  (!)  160/80      Allergies as of 3/31/2017     Invokana [Canagliflozin]    Percocet [Oxycodone-acetaminophen]    Biaxin [Clarithromycin]    Hydrocodone    Sulfa (Sulfonamide Antibiotics)      Immunizations Administered on Date of Encounter - 3/31/2017     None      Language Assistance Services     ATTENTION: Language assistance services are available, free of charge. Please call [phone].      ATENCIÓN: Si habla wingañol, tiene a cummings disposición servicios gratuitos de asistencia lingüística. Llame al [phone].     JAY JAY Ý: N?u b?n nói Ti?ng Vi?t, có các d?ch v? h? tr? ngôn ng? mi?n phí dành cho b?n. G?i s? [phone].         [facility] complies with applicable Federal civil rights laws and does not discriminate on the basis of race, color, national origin, age, disability, or sex.        "

## 2017-04-28 ENCOUNTER — OFFICE VISIT (OUTPATIENT)
Dept: UROLOGY | Facility: CLINIC | Age: 57
End: 2017-04-28
Payer: COMMERCIAL

## 2017-04-28 VITALS — WEIGHT: 202.19 LBS | BODY MASS INDEX: 33.69 KG/M2 | HEIGHT: 65 IN

## 2017-04-28 DIAGNOSIS — N40.1 BPH WITH OBSTRUCTION/LOWER URINARY TRACT SYMPTOMS: ICD-10-CM

## 2017-04-28 DIAGNOSIS — N13.8 BPH WITH OBSTRUCTION/LOWER URINARY TRACT SYMPTOMS: ICD-10-CM

## 2017-04-28 DIAGNOSIS — N20.0 KIDNEY STONES: Primary | ICD-10-CM

## 2017-04-28 LAB
BILIRUB SERPL-MCNC: NORMAL MG/DL
BLOOD URINE, POC: NORMAL
COLOR, POC UA: YELLOW
GLUCOSE UR QL STRIP: POSITIVE
KETONES UR QL STRIP: NORMAL
LEUKOCYTE ESTERASE URINE, POC: NORMAL
NITRITE, POC UA: NORMAL
PH, POC UA: 6
PROTEIN, POC: NORMAL
SPECIFIC GRAVITY, POC UA: 1030
UROBILINOGEN, POC UA: NORMAL

## 2017-04-28 PROCEDURE — 1160F RVW MEDS BY RX/DR IN RCRD: CPT | Mod: S$GLB,,, | Performed by: UROLOGY

## 2017-04-28 PROCEDURE — 99999 PR PBB SHADOW E&M-EST. PATIENT-LVL II: CPT | Mod: PBBFAC,,, | Performed by: UROLOGY

## 2017-04-28 PROCEDURE — 88300 SURGICAL PATH GROSS: CPT | Performed by: PATHOLOGY

## 2017-04-28 PROCEDURE — 88300 SURGICAL PATH GROSS: CPT | Mod: 26,,, | Performed by: PATHOLOGY

## 2017-04-28 PROCEDURE — 82370 X-RAY ASSAY CALCULUS: CPT

## 2017-04-28 PROCEDURE — 81001 URINALYSIS AUTO W/SCOPE: CPT | Mod: S$GLB,,, | Performed by: UROLOGY

## 2017-04-28 PROCEDURE — 99214 OFFICE O/P EST MOD 30 MIN: CPT | Mod: 25,S$GLB,, | Performed by: UROLOGY

## 2017-04-28 RX ORDER — TAMSULOSIN HYDROCHLORIDE 0.4 MG/1
0.4 CAPSULE ORAL DAILY
COMMUNITY
End: 2017-10-03 | Stop reason: SDUPTHER

## 2017-04-28 NOTE — PROGRESS NOTES
Subjective:       Patient ID: Cortes Schroeder is a 57 y.o. male who was last seen in this office 3/27/2017    Chief Complaint:   Chief Complaint   Patient presents with    Nephrolithiasis     f/u visit pt passed kidney stones       History of Kidney Stones  He has never required a procedure.  He passed a stone in 2003. He passed another large stone last month.  He is here to bring it in for analysis.  He feels fine, no hematuria or dysuria or flank pain.    Erectile Dysfunction  Patient complains of erectile dysfunction. Onset of dysfunction was several years ago and was gradual in onset.  Patient states the nature of difficulty is both attaining and maintaining erection. Full erections occur never. Partial erections occur never. Libido is not affected. Risk factors for ED include diabetes mellitus. Patient denies history of pelvic radiation. Previous treatment of ED includes Viagra and Cialis and Trimix.  He is willing to try Trimix again.    Benign Prostatic Hyperplasia  He patient reports frequency and nocturia two times a night. He denies straining, urgency and weak stream. The patient states symptoms are of mild severity. Onset of symptoms was several years ago and was gradual in onset.  He has no personal history and no family history of prostate cancer. He reports a history of kidney stones. He denies flank pain, gross hematuria and recurrent UTI.  He is currently taking no prostate medications.    Groin Rash  He has used Diflucan in the past.  It is very itchy and sometimes bleeds from scratching too much.    Scrotal Abscess  He had an abscess of the scrotum that was treated in the ED in November 2016.  He was kept in the hospital for several days.  He has some pains at times but is mostly better.  ACTIVE MEDICAL ISSUES:  Patient Active Problem List   Diagnosis    Hyperlipidemia    Anxiety    Back pain    Neuropathy    Uncontrolled type 2 diabetes mellitus with diabetic polyneuropathy, with long-term  "current use of insulin    NPDR (nonproliferative diabetic retinopathy)    Insomnia    Gastroesophageal reflux disease without esophagitis    Hypertension, uncontrolled    Chronic pain syndrome       ALLERGIES AND MEDICATIONS: updated and reviewed.  Review of patient's allergies indicates:   Allergen Reactions    Invokana [canagliflozin] Anaphylaxis    Percocet [oxycodone-acetaminophen] Nausea Only and Hallucinations    Biaxin [clarithromycin]     Hydrocodone Other (See Comments)     Dizzy/nausea/hallucinations    Sulfa (sulfonamide antibiotics) Nausea Only and Rash     Current Outpatient Prescriptions   Medication Sig    aspirin (ECOTRIN) 81 MG EC tablet Take 1 tablet (81 mg total) by mouth once daily.    BD ULTRA-FINE DANNY PEN NEEDLES 32 gauge x 5/32" Ndle Inject 1 each into the skin 4 (four) times daily before meals and nightly.    blood sugar diagnostic Strp 1 strip by Misc.(Non-Drug; Combo Route) route 3 (three) times daily.    clotrimazole-betamethasone 1-0.05% (LOTRISONE) cream Apply topically 2 (two) times daily.    fish oil-omega-3 fatty acids 300-1,000 mg capsule Take by mouth 2 (two) times daily.    fluoxetine (PROZAC) 40 MG capsule Take 1 capsule (40 mg total) by mouth 2 (two) times daily.    gabapentin (NEURONTIN) 100 MG capsule Take 1 capsule (100 mg total) by mouth once daily.    losartan (COZAAR) 50 MG tablet Take 1 tablet (50 mg total) by mouth once daily.    magnesium oxide-Mg AA chelate (MG-PLUS-PROTEIN) 133 mg Tab Take 500 mg by mouth every evening.     NOVOLOG FLEXPEN 100 unit/mL InPn pen Inject 25 Units into the skin 3 (three) times daily with meals.    pravastatin (PRAVACHOL) 80 MG tablet Take 0.5 tablets (40 mg total) by mouth once daily. Take half tablet daily    promethazine-dextromethorphan (PROMETHAZINE-DM) 6.25-15 mg/5 mL Syrp Take 5 mLs by mouth nightly as needed.    syringe, disposable, 1 mL Syrg Use as directed    tamsulosin (FLOMAX) 0.4 mg Cp24 Take 0.4 mg by " "mouth once daily.    TOUJEO SOLOSTAR 300 unit/mL (1.5 mL) InPn pen Inject 72 Units into the skin every evening.    vitamin D 1000 units Tab Take 185 mg by mouth 2 (two) times daily.      No current facility-administered medications for this visit.        Review of Systems   Constitutional: Negative for activity change, fatigue, fever and unexpected weight change.   HENT: Negative for congestion.    Eyes: Negative for redness.   Respiratory: Negative for chest tightness and shortness of breath.    Cardiovascular: Negative for chest pain and leg swelling.   Gastrointestinal: Negative for abdominal pain, constipation, diarrhea, nausea and vomiting.   Genitourinary: Negative for dysuria, flank pain, frequency, hematuria, penile pain, penile swelling, scrotal swelling, testicular pain and urgency.   Musculoskeletal: Negative for arthralgias and back pain.   Neurological: Negative for dizziness and light-headedness.   Psychiatric/Behavioral: Negative for behavioral problems and confusion. The patient is not nervous/anxious.    All other systems reviewed and are negative.      Objective:      Vitals:    04/28/17 1526   Weight: 91.7 kg (202 lb 2.6 oz)   Height: 5' 5" (1.651 m)     Physical Exam   Nursing note and vitals reviewed.  Constitutional: He is oriented to person, place, and time. He appears well-developed and well-nourished.   HENT:   Head: Normocephalic.   Eyes: Conjunctivae are normal.   Neck: Normal range of motion. Neck supple. No tracheal deviation present. No thyromegaly present.   Cardiovascular: Normal rate and normal heart sounds.    Pulmonary/Chest: Effort normal and breath sounds normal. No respiratory distress. He has no wheezes.   Abdominal: Soft. Bowel sounds are normal. There is no hepatosplenomegaly. There is no tenderness. There is no rebound and no CVA tenderness. No hernia.   Musculoskeletal: Normal range of motion. He exhibits no edema or tenderness.   Lymphadenopathy:     He has no cervical " adenopathy.   Neurological: He is alert and oriented to person, place, and time.   Skin: Skin is warm and dry. No rash noted. No erythema.     Psychiatric: He has a normal mood and affect. His behavior is normal. Judgment and thought content normal.       Urine dipstick shows negative for all components.  Micro exam: negative for WBC's or RBC's.    Assessment:       1. Kidney stones    2. BPH with obstruction/lower urinary tract symptoms          Plan:       1. Kidney stones    - POCT urinalysis, dipstick or tablet reag  - Urinary Stone Analysis  - Tissue Specimen To Pathology, Urology    2. BPH with obstruction/lower urinary tract symptoms    - Prostate Specific Antigen, Diagnostic; Future  - POCT urinalysis, dipstick or tablet reag        His  Koko is very ill.    Return in about 6 months (around 10/28/2017) for Follow up, Review X-ray, Review PSA.

## 2017-04-28 NOTE — MR AVS SNAPSHOT
[facility]  [address] [facility] [address]  [place] [address]  Phone: [phone]                  Cortes Schroeder   2017 3:15 PM   Office Visit    Description:  Male : 1960   Provider:  DEEPTHI Villegas MD   Department:  [facility]           Reason for Visit     Nephrolithiasis           Diagnoses this Visit        Comments    Kidney stones    -  Primary     BPH with obstruction/lower urinary tract symptoms                To Do List           Goals (5 Years of Data)     None      Follow-Up and Disposition     Return in about 6 months (around 10/28/2017) for Follow up, Review X-ray, Review PSA.      [facility]     [facility] Nurse Care Line -  Assistance  Unless otherwise directed by your provider, please contact [facility], our nurse care line that is available for  assistance.     Registered nurses in the [facility] provide: appointment scheduling, clinical advisement, health education, and other advisory services.  Call: [phone] (toll free)               Medications           Message regarding Medications     Verify the changes and/or additions to your medication regime listed below are the same as discussed with your clinician today.  If any of these changes or additions are incorrect, please notify your healthcare provider.        STOP taking these medications     pantoprazole (PROTONIX) 40 MG tablet Take 1 tablet (40 mg total) by mouth once daily.    pep injection Inject 0.5 ml as directed     For compounding pharmacy use:   Add PAPAVERINE 30 mcg  Add PHENTOLAMINE 10 mg  Add ALPROSTADIL 100 mcg           Verify that the below list of medications is an accurate representation of the medications you are currently taking.  If none reported, the list may be blank. If incorrect, please contact your healthcare provider. Carry this list with you in case of emergency.           Current Medications     aspirin (ECOTRIN) 81 MG EC tablet Take  "1 tablet (81 mg total) by mouth once daily.    BD ULTRA-FINE DANNY PEN NEEDLES 32 gauge x 5/32" Ndle Inject 1 each into the skin 4 (four) times daily before meals and nightly.    blood sugar diagnostic Strp 1 strip by Misc.(Non-Drug; Combo Route) route 3 (three) times daily.    clotrimazole-betamethasone 1-0.05% (LOTRISONE) cream Apply topically 2 (two) times daily.    fish oil-omega-3 fatty acids 300-1,000 mg capsule Take by mouth 2 (two) times daily.    fluoxetine (PROZAC) 40 MG capsule Take 1 capsule (40 mg total) by mouth 2 (two) times daily.    gabapentin (NEURONTIN) 100 MG capsule Take 1 capsule (100 mg total) by mouth once daily.    losartan (COZAAR) 50 MG tablet Take 1 tablet (50 mg total) by mouth once daily.    magnesium oxide-Mg AA chelate (MG-PLUS-PROTEIN) 133 mg Tab Take 500 mg by mouth every evening.     NOVOLOG FLEXPEN 100 unit/mL InPn pen Inject 25 Units into the skin 3 (three) times daily with meals.    pravastatin (PRAVACHOL) 80 MG tablet Take 0.5 tablets (40 mg total) by mouth once daily. Take half tablet daily    promethazine-dextromethorphan (PROMETHAZINE-DM) 6.25-15 mg/5 mL Syrp Take 5 mLs by mouth nightly as needed.    syringe, disposable, 1 mL Syrg Use as directed    tamsulosin (FLOMAX) 0.4 mg Cp24 Take 0.4 mg by mouth once daily.    TOUJEO SOLOSTAR 300 unit/mL (1.5 mL) InPn pen Inject 72 Units into the skin every evening.    vitamin D 1000 units Tab Take 185 mg by mouth 2 (two) times daily.            Clinical Reference Information           Your Vitals Were     Height Weight BMI          5' 5" (1.651 m) 91.7 kg (202 lb 2.6 oz) 33.64 kg/m2        Allergies as of 4/28/2017     Invokana [Canagliflozin]    Percocet [Oxycodone-acetaminophen]    Biaxin [Clarithromycin]    Hydrocodone    Sulfa (Sulfonamide Antibiotics)      Immunizations Administered on Date of Encounter - 4/28/2017     None      Orders Placed During Today's Visit      Normal Orders This Visit    POCT urinalysis, dipstick or tablet " reag     POCT urinalysis, dipstick or tablet reag     Tissue Specimen To Pathology, Urology     Urinary Stone Analysis     Future Labs/Procedures Expected by Expires    Prostate Specific Antigen, Diagnostic  10/25/2017 4/28/2018      Language Assistance Services     ATTENTION: Language assistance services are available, free of charge. Please call [phone].      ATENCIÓN: Si habla paige, tiene a cummings disposición servicios gratuitos de asistencia lingüística. Llame al [phone].     CHÚ Ý: N?u b?n nói Ti?ng Vi?t, có các d?ch v? h? tr? ngôn ng? mi?n phí dành cho b?n. G?i s? [phone].         [facility] complies with applicable Federal civil rights laws and does not discriminate on the basis of race, color, national origin, age, disability, or sex.

## 2017-05-10 LAB — URINARY STONE ANALYSIS: NORMAL

## 2017-05-26 DIAGNOSIS — E11.9 TYPE 2 DIABETES MELLITUS WITHOUT COMPLICATION: ICD-10-CM

## 2017-06-06 ENCOUNTER — PATIENT MESSAGE (OUTPATIENT)
Dept: OPTOMETRY | Facility: CLINIC | Age: 57
End: 2017-06-06

## 2017-06-06 RX ORDER — CALCIUM CITRATE/VITAMIN D3 200MG-6.25
TABLET ORAL
Qty: 300 STRIP | Refills: 0 | Status: SHIPPED | OUTPATIENT
Start: 2017-06-06 | End: 2017-07-10 | Stop reason: SDUPTHER

## 2017-07-10 ENCOUNTER — PATIENT MESSAGE (OUTPATIENT)
Dept: FAMILY MEDICINE | Facility: CLINIC | Age: 57
End: 2017-07-10

## 2017-07-10 DIAGNOSIS — E78.5 HYPERLIPIDEMIA, UNSPECIFIED HYPERLIPIDEMIA TYPE: Primary | ICD-10-CM

## 2017-07-10 DIAGNOSIS — E78.2 MIXED HYPERLIPIDEMIA: ICD-10-CM

## 2017-07-10 RX ORDER — PRAVASTATIN SODIUM 80 MG/1
80 TABLET ORAL DAILY
Qty: 90 TABLET | Refills: 2 | Status: SHIPPED | OUTPATIENT
Start: 2017-07-10 | End: 2018-01-08 | Stop reason: SDUPTHER

## 2017-07-10 RX ORDER — PRAVASTATIN SODIUM 80 MG/1
80 TABLET ORAL DAILY
Qty: 90 TABLET | Refills: 2 | Status: SHIPPED | OUTPATIENT
Start: 2017-07-10 | End: 2017-07-10 | Stop reason: SDUPTHER

## 2017-07-10 NOTE — TELEPHONE ENCOUNTER
Pravastatin prescription sent in to pharmacy has 2 sets of directions; take 1 tablet daily, and take 1/2 tablet daily; pt states he is taking 1 tablet daily and pharmacy only sent 45 pills for 90 day supply because of the directions; please clarify and send new prescription

## 2017-07-14 ENCOUNTER — OFFICE VISIT (OUTPATIENT)
Dept: OPTOMETRY | Facility: CLINIC | Age: 57
End: 2017-07-14
Payer: COMMERCIAL

## 2017-07-14 DIAGNOSIS — H52.03 HYPEROPIA WITH PRESBYOPIA, BILATERAL: ICD-10-CM

## 2017-07-14 DIAGNOSIS — E11.3292 MILD NONPROLIFERATIVE DIABETIC RETINOPATHY OF LEFT EYE WITHOUT MACULAR EDEMA ASSOCIATED WITH TYPE 2 DIABETES MELLITUS: Primary | ICD-10-CM

## 2017-07-14 DIAGNOSIS — H25.13 NUCLEAR SCLEROSIS, BILATERAL: ICD-10-CM

## 2017-07-14 DIAGNOSIS — H52.4 HYPEROPIA WITH PRESBYOPIA, BILATERAL: ICD-10-CM

## 2017-07-14 PROCEDURE — 92014 COMPRE OPH EXAM EST PT 1/>: CPT | Mod: S$GLB,,, | Performed by: OPTOMETRIST

## 2017-07-14 PROCEDURE — 92015 DETERMINE REFRACTIVE STATE: CPT | Mod: S$GLB,,, | Performed by: OPTOMETRIST

## 2017-07-14 PROCEDURE — 99999 PR PBB SHADOW E&M-EST. PATIENT-LVL III: CPT | Mod: PBBFAC,,, | Performed by: OPTOMETRIST

## 2017-07-14 NOTE — PROGRESS NOTES
HPI     DLS: 7/13/2016  Pt states vision is blurry all distances with glasses. States eyes get   tired quickly. Denies flashes of light and floaters.  Hx diab ret OU  Systane ou PRN     LBS= 173 at lunch  Hemoglobin A1C       Date                     Value               Ref Range             Status                02/07/2017               10.6 (H)            4.8 - 5.6 %           Final                  10/30/2016               12.2 (H)            4.5 - 6.2 %           Final              ----------          Last edited by Pollo Mercedes, OD on 7/14/2017  2:36 PM. (History)        ROS     Positive for: Endocrine (DM)    Negative for: Constitutional, Gastrointestinal, Neurological, Skin,   Genitourinary, Musculoskeletal, HENT, Cardiovascular, Respiratory,   Psychiatric, Allergic/Imm, Heme/Lymph    Last edited by Pollo Mercedes, OD on 7/14/2017  2:36 PM. (History)        Assessment /Plan     For exam results, see Encounter Report.    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 Small cats OD>OS--wrote new spex Rx.  pt tried PALs in past--didn't like.  Now has separate dist/int/near--wrote new Rx  2. DM w Mild NPDR OS  3. ELLIS--advised SYS BAL or SOOTHE XP Ats prn       Plan:    rtc 6 mos to recheck DFE

## 2017-08-31 DIAGNOSIS — E11.9 TYPE 2 DIABETES MELLITUS WITHOUT COMPLICATION: ICD-10-CM

## 2017-09-26 ENCOUNTER — TELEPHONE (OUTPATIENT)
Dept: UROLOGY | Facility: CLINIC | Age: 57
End: 2017-09-26

## 2017-10-02 ENCOUNTER — OFFICE VISIT (OUTPATIENT)
Dept: FAMILY MEDICINE | Facility: CLINIC | Age: 57
End: 2017-10-02
Payer: COMMERCIAL

## 2017-10-02 VITALS
OXYGEN SATURATION: 96 % | WEIGHT: 196.19 LBS | RESPIRATION RATE: 16 BRPM | HEIGHT: 65 IN | HEART RATE: 68 BPM | BODY MASS INDEX: 32.69 KG/M2 | DIASTOLIC BLOOD PRESSURE: 70 MMHG | TEMPERATURE: 98 F | SYSTOLIC BLOOD PRESSURE: 126 MMHG

## 2017-10-02 DIAGNOSIS — R29.898 RIGHT LEG WEAKNESS: ICD-10-CM

## 2017-10-02 DIAGNOSIS — I10 HYPERTENSION, WELL CONTROLLED: ICD-10-CM

## 2017-10-02 DIAGNOSIS — R29.898 RIGHT ARM WEAKNESS: ICD-10-CM

## 2017-10-02 DIAGNOSIS — Z23 FLU VACCINE NEED: ICD-10-CM

## 2017-10-02 DIAGNOSIS — G47.62 NOCTURNAL LEG CRAMPS: ICD-10-CM

## 2017-10-02 PROCEDURE — 90686 IIV4 VACC NO PRSV 0.5 ML IM: CPT | Mod: S$GLB,,, | Performed by: FAMILY MEDICINE

## 2017-10-02 PROCEDURE — 99999 PR PBB SHADOW E&M-EST. PATIENT-LVL IV: CPT | Mod: PBBFAC,,, | Performed by: FAMILY MEDICINE

## 2017-10-02 PROCEDURE — 99214 OFFICE O/P EST MOD 30 MIN: CPT | Mod: 25,S$GLB,, | Performed by: FAMILY MEDICINE

## 2017-10-02 PROCEDURE — 90471 IMMUNIZATION ADMIN: CPT | Mod: S$GLB,,, | Performed by: FAMILY MEDICINE

## 2017-10-02 NOTE — PROGRESS NOTES
Hemoglobin A1c  orders in - pt will call when ready     Influenza Vaccine Ok to get today     PROSTATE-SPECIFIC ANTIGEN  consult today     Lipid Panel  orders in - pt will call when ready

## 2017-10-02 NOTE — PROGRESS NOTES
"Subjective:       Patient ID: Cortes Schroeder is a 57 y.o. male.    Chief Complaint: Arm Pain (right side past 2 months) and Leg Pain (right side past 2 months)    HPI    R arm pain - onset 2 months ago - Pain radiating down from his R neck down his arm a/w weakness in the R arm. He is noting difficulty hold a drill or other household activities,     R Leg pain x 2 months - he has "charley horses" a/w soreness in his R calf and weakness, noted when he was trying to climb a ladder.     Influenza - pt agreed to get today.     DM2- uncontrolled - Pt gets frustrated with this often as it seems very high without reason or bad dietary choice. He has been missing doses of his medications as well, especially at night.     Importantly, he notes that he lost his job and his insurance will be running out in 11 days. He has had a very difficult time this past year with the passing of his father and his partner Koko, in addition to having stress altercations with a boss at work that was recently replaced. Overall however patient states that he's in a good state of mind and has applications out for new positions and is already received offers for interviews. Despite this he may have a significant gap and insurance coverage.          Current Outpatient Prescriptions on File Prior to Visit   Medication Sig Dispense Refill    aspirin (ECOTRIN) 81 MG EC tablet Take 1 tablet (81 mg total) by mouth once daily.  0    BD ULTRA-FINE DANNY PEN NEEDLES 32 gauge x 5/32" Ndle Inject 1 each into the skin 4 (four) times daily before meals and nightly. 120 each 11    blood sugar diagnostic (TRUE METRIX GLUCOSE TEST STRIP) Strp 4 strips by Subdermal route 4 (four) times daily. 400 strip 0    clotrimazole-betamethasone 1-0.05% (LOTRISONE) cream Apply topically 2 (two) times daily. 15 g 5    fish oil-omega-3 fatty acids 300-1,000 mg capsule Take by mouth 2 (two) times daily.      fluoxetine (PROZAC) 40 MG capsule Take 1 capsule (40 mg total) " by mouth 2 (two) times daily. 180 capsule 3    gabapentin (NEURONTIN) 100 MG capsule Take 1 capsule (100 mg total) by mouth once daily. 90 capsule 3    losartan (COZAAR) 50 MG tablet Take 1 tablet (50 mg total) by mouth once daily. 90 tablet 3    magnesium oxide-Mg AA chelate (MG-PLUS-PROTEIN) 133 mg Tab Take 500 mg by mouth every evening.       metformin (GLUCOPHAGE) 1000 MG tablet Take 1 tablet (1,000 mg total) by mouth 2 (two) times daily with meals. 180 tablet 3    NOVOLOG FLEXPEN 100 unit/mL InPn pen Inject 25 Units into the skin 3 (three) times daily with meals. 67.5 mL 3    pravastatin (PRAVACHOL) 80 MG tablet Take 1 tablet (80 mg total) by mouth once daily. 90 tablet 2    promethazine-dextromethorphan (PROMETHAZINE-DM) 6.25-15 mg/5 mL Syrp Take 5 mLs by mouth nightly as needed. 120 mL 0    SITagliptan (JANUVIA) 100 MG Tab Take 1 tablet (100 mg total) by mouth once daily. 90 tablet 3    syringe, disposable, 1 mL Syrg Use as directed 100 Syringe 11    tamsulosin (FLOMAX) 0.4 mg Cp24 Take 0.4 mg by mouth once daily.      TOUJEO SOLOSTAR 300 unit/mL (1.5 mL) InPn pen Inject 72 Units into the skin every evening. 21.6 mL 3    vitamin D 1000 units Tab Take 185 mg by mouth 2 (two) times daily.        No current facility-administered medications on file prior to visit.        Past Medical History:   Diagnosis Date    Anxiety 12/18/2012    Back pain 12/18/2012    Cataract     Chronic pain syndrome 4/24/2016    Diabetes type 2, controlled 2/20/2016    Diabetic retinopathy     DM (diabetes mellitus) 12/18/2012    DM (diabetes mellitus), type 2, uncontrolled 11/16/2013    Essential hypertension 2/20/2016    Gastroesophageal reflux disease without esophagitis 2/20/2016    Hyperlipidemia 12/18/2012    Insomnia 8/7/2014    Neuropathy 11/16/2013       Family History   Problem Relation Age of Onset    Cataracts Father     Amblyopia Neg Hx     Blindness Neg Hx     Glaucoma Neg Hx     Macular  degeneration Neg Hx     Retinal detachment Neg Hx     Strabismus Neg Hx         reports that he has never smoked. He has never used smokeless tobacco. He reports that he drinks about 0.6 oz of alcohol per week . He reports that he does not use drugs.    Review of Systems   Constitutional: Negative for activity change and unexpected weight change.   HENT: Negative for hearing loss, rhinorrhea and trouble swallowing.    Eyes: Negative for discharge and visual disturbance.   Respiratory: Negative for chest tightness and wheezing.    Cardiovascular: Negative for chest pain and palpitations.   Gastrointestinal: Negative for blood in stool, constipation, diarrhea and vomiting.   Endocrine: Negative for polydipsia and polyuria.   Genitourinary: Positive for urgency. Negative for difficulty urinating and hematuria.   Musculoskeletal: Positive for arthralgias and neck pain. Negative for joint swelling.   Neurological: Positive for weakness and headaches.   Psychiatric/Behavioral: Positive for confusion and dysphoric mood.       Objective:     Vitals:    10/02/17 1551   BP: 126/70   Pulse: 68   Resp: 16   Temp: 98.2 °F (36.8 °C)        Physical Exam   Constitutional: He appears well-developed. No distress.   HENT:   Head: Normocephalic and atraumatic.   Eyes: Conjunctivae are normal. No scleral icterus.   Pulmonary/Chest: Effort normal.   Neurological: He is alert.   Psychiatric: He has a normal mood and affect. His behavior is normal.   Nursing note and vitals reviewed.      Assessment:       1. Uncontrolled type 2 diabetes mellitus with diabetic polyneuropathy, with long-term current use of insulin    2. Hypertension, well controlled    3. Right arm weakness    4. Flu vaccine need    5. Nocturnal leg cramps    6. Right leg weakness        Plan:       Cortes was seen today for arm pain and leg pain.    Diagnoses and all orders for this visit:    I will consult case management to help him navigate the insurance environment  after he loses his work insurance in 11 days. I informed him that I will be willing to refill his medications and answer whatever questions I can over the phone or through the patient portal during this time of insurance lapse as to assure that he has no lapse in care. I also notified patient that if he needed to be seen at the [facility] is free .    Uncontrolled type 2 diabetes mellitus with diabetic polyneuropathy, with long-term current use of insulin  -     Ambulatory referral to Outpatient Case Management  Patient's diabetes is very poorly controlled and has been for the entire time that I have known Mr. Schroeder. I encouraged him to be adherent with this insulin as with his other medications and to work hard on lifestyle changes. This will be very difficult during this stressful time in his life with his recent losses and job transition. Patient continues to follow with Dr. Mills endocrinology.    Hypertension, well controlled  -     Ambulatory referral to Outpatient Case Management    Right arm weakness  Alarming symptom from a patient with history of neck arthritis. Due to his insurance lapse will be unable to follow-up with neurology in the near future. Will address again when his insurance is restored. Her now physical therapy handout of neck exercises was given to the patient to do at home twice per day.    Flu vaccine need  -     Influenza - Quadrivalent (3 years & older) (PF)    Nocturnal leg cramps  Recommended treatments from a recent study that I reviewed #1 vitamin E 600 units #2 vitamin B-12 both taken 30 minutes before bed #3 calf stretches for 5 minutes prior to bed.      Right leg weakness  Associated with the leg cramps above. But in the setting of right upper extremity weakness also concerning for potential radiculopathy. Will further address this complaint when his insurance is restored and he can follow up with neurology.          Return in about 6 weeks (around 11/13/2017) for  Hypertension, Diabetes Type 2.      Pt verbalized understanding and agreed with our plan.     At least 25 minutes were spent today with the patient in the office, which more than 50% of the time was spent on evaluation and counseling regarding The primary encounter diagnosis was Uncontrolled type 2 diabetes mellitus with diabetic polyneuropathy, with long-term current use of insulin. Diagnoses of Hypertension, well controlled, Right arm weakness, Flu vaccine need, Nocturnal leg cramps, and Right leg weakness were also pertinent to this visit..

## 2017-10-03 ENCOUNTER — TELEPHONE (OUTPATIENT)
Dept: FAMILY MEDICINE | Facility: CLINIC | Age: 57
End: 2017-10-03

## 2017-10-03 ENCOUNTER — OUTPATIENT CASE MANAGEMENT (OUTPATIENT)
Dept: ADMINISTRATIVE | Facility: OTHER | Age: 57
End: 2017-10-03

## 2017-10-03 ENCOUNTER — OFFICE VISIT (OUTPATIENT)
Dept: UROLOGY | Facility: CLINIC | Age: 57
End: 2017-10-03
Payer: COMMERCIAL

## 2017-10-03 ENCOUNTER — PATIENT MESSAGE (OUTPATIENT)
Dept: FAMILY MEDICINE | Facility: CLINIC | Age: 57
End: 2017-10-03

## 2017-10-03 VITALS
DIASTOLIC BLOOD PRESSURE: 70 MMHG | WEIGHT: 193.81 LBS | SYSTOLIC BLOOD PRESSURE: 118 MMHG | HEART RATE: 60 BPM | BODY MASS INDEX: 32.29 KG/M2 | HEIGHT: 65 IN

## 2017-10-03 DIAGNOSIS — N13.8 BPH WITH OBSTRUCTION/LOWER URINARY TRACT SYMPTOMS: Primary | ICD-10-CM

## 2017-10-03 DIAGNOSIS — R21 GROIN RASH: ICD-10-CM

## 2017-10-03 DIAGNOSIS — R35.1 NOCTURIA MORE THAN TWICE PER NIGHT: ICD-10-CM

## 2017-10-03 DIAGNOSIS — N40.1 BPH WITH OBSTRUCTION/LOWER URINARY TRACT SYMPTOMS: Primary | ICD-10-CM

## 2017-10-03 PROCEDURE — 81001 URINALYSIS AUTO W/SCOPE: CPT | Mod: S$GLB,,, | Performed by: UROLOGY

## 2017-10-03 PROCEDURE — 99999 PR PBB SHADOW E&M-EST. PATIENT-LVL III: CPT | Mod: PBBFAC,,, | Performed by: UROLOGY

## 2017-10-03 PROCEDURE — 99214 OFFICE O/P EST MOD 30 MIN: CPT | Mod: 25,S$GLB,, | Performed by: UROLOGY

## 2017-10-03 RX ORDER — TAMSULOSIN HYDROCHLORIDE 0.4 MG/1
0.4 CAPSULE ORAL DAILY
Qty: 90 CAPSULE | Refills: 3 | Status: SHIPPED | OUTPATIENT
Start: 2017-10-03 | End: 2018-01-08 | Stop reason: SDUPTHER

## 2017-10-03 RX ORDER — FLUCONAZOLE 150 MG/1
150 TABLET ORAL DAILY
Qty: 5 TABLET | Refills: 5 | Status: SHIPPED | OUTPATIENT
Start: 2017-10-03 | End: 2017-10-08

## 2017-10-03 NOTE — TELEPHONE ENCOUNTER
Called pt and scheduled labs for tomorrow morning at [facility]; do you want to get urine micoalbumin also??  If so needs to change to home collect

## 2017-10-03 NOTE — PROGRESS NOTES
Thank you for the referral.  Patient has been assigned to PADDY Roman  for low risk screening for Outpatient Case Management.     Reason for referral: Low risk     Uncontrolled type 2 diabetes mellitus with diabetic polyneuropathy, with long-term current use of insulin  Hypertension, well controlled    Thank you,  Jennifer Carrasco, SSC

## 2017-10-03 NOTE — PROGRESS NOTES
Subjective:       Patient ID: Cortes Schroeder is a 57 y.o. male who was last seen in this office 4/28/2017    Chief Complaint:   Chief Complaint   Patient presents with    Nephrolithiasis     6 month f/u pt was unable to get psa          History of Kidney Stones  He has never required a procedure.  He passed a stone in 2003. He passed another large stone last month.  He is here to bring it in for analysis.  He feels fine, no hematuria or dysuria or flank pain.    Erectile Dysfunction  Patient complains of erectile dysfunction. Onset of dysfunction was several years ago and was gradual in onset.  Patient states the nature of difficulty is both attaining and maintaining erection. Full erections occur never. Partial erections occur never. Libido is not affected. Risk factors for ED include diabetes mellitus. Patient denies history of pelvic radiation. Previous treatment of ED includes Viagra and Cialis and Trimix.  He is willing to try Trimix again.    Benign Prostatic Hyperplasia  He patient reports frequency and nocturia two times a night. He denies straining, urgency and weak stream. The patient states symptoms are of mild severity. Onset of symptoms was several years ago and was gradual in onset.  He has no personal history and no family history of prostate cancer. He reports a history of kidney stones. He denies flank pain, gross hematuria and recurrent UTI.  He is currently taking no prostate medications.    Groin Rash  He has used Diflucan in the past.  It is very itchy and sometimes bleeds from scratching too much.    Scrotal Abscess  He had an abscess of the scrotum that was treated in the ED in November 2016.  He was kept in the hospital for several days.  He has some pains at times but is mostly better.      His  passed away in May.  He is still having a difficult time.    ACTIVE MEDICAL ISSUES:  Patient Active Problem List   Diagnosis    Hyperlipidemia    Anxiety    Back pain    Neuropathy     "Uncontrolled type 2 diabetes mellitus with diabetic polyneuropathy, with long-term current use of insulin    NPDR (nonproliferative diabetic retinopathy)    Insomnia    Gastroesophageal reflux disease without esophagitis    Hypertension, well controlled    Chronic pain syndrome       ALLERGIES AND MEDICATIONS: updated and reviewed.  Review of patient's allergies indicates:   Allergen Reactions    Invokana [canagliflozin] Anaphylaxis    Percocet [oxycodone-acetaminophen] Nausea Only and Hallucinations    Biaxin [clarithromycin]     Hydrocodone Other (See Comments)     Dizzy/nausea/hallucinations    Sulfa (sulfonamide antibiotics) Nausea Only and Rash     Current Outpatient Prescriptions   Medication Sig    aspirin (ECOTRIN) 81 MG EC tablet Take 1 tablet (81 mg total) by mouth once daily.    BD ULTRA-FINE DANNY PEN NEEDLES 32 gauge x 5/32" Ndle Inject 1 each into the skin 4 (four) times daily before meals and nightly.    blood sugar diagnostic (TRUE METRIX GLUCOSE TEST STRIP) Strp 4 strips by Subdermal route 4 (four) times daily.    clotrimazole-betamethasone 1-0.05% (LOTRISONE) cream Apply topically 2 (two) times daily.    fish oil-omega-3 fatty acids 300-1,000 mg capsule Take by mouth 2 (two) times daily.    fluoxetine (PROZAC) 40 MG capsule Take 1 capsule (40 mg total) by mouth 2 (two) times daily.    gabapentin (NEURONTIN) 100 MG capsule Take 1 capsule (100 mg total) by mouth once daily.    losartan (COZAAR) 50 MG tablet Take 1 tablet (50 mg total) by mouth once daily.    magnesium oxide-Mg AA chelate (MG-PLUS-PROTEIN) 133 mg Tab Take 500 mg by mouth every evening.     metformin (GLUCOPHAGE) 1000 MG tablet Take 1 tablet (1,000 mg total) by mouth 2 (two) times daily with meals.    NOVOLOG FLEXPEN 100 unit/mL InPn pen Inject 25 Units into the skin 3 (three) times daily with meals.    pravastatin (PRAVACHOL) 80 MG tablet Take 1 tablet (80 mg total) by mouth once daily.    " "promethazine-dextromethorphan (PROMETHAZINE-DM) 6.25-15 mg/5 mL Syrp Take 5 mLs by mouth nightly as needed.    SITagliptan (JANUVIA) 100 MG Tab Take 1 tablet (100 mg total) by mouth once daily.    syringe, disposable, 1 mL Syrg Use as directed    tamsulosin (FLOMAX) 0.4 mg Cp24 Take 1 capsule (0.4 mg total) by mouth once daily.    TOUJEO SOLOSTAR 300 unit/mL (1.5 mL) InPn pen Inject 72 Units into the skin every evening.    vitamin D 1000 units Tab Take 185 mg by mouth 2 (two) times daily.     fluconazole (DIFLUCAN) 150 MG Tab Take 1 tablet (150 mg total) by mouth once daily.     No current facility-administered medications for this visit.        Review of Systems   Constitutional: Negative for activity change, fatigue, fever and unexpected weight change.   HENT: Negative for congestion.    Eyes: Negative for redness.   Respiratory: Negative for chest tightness and shortness of breath.    Cardiovascular: Negative for chest pain and leg swelling.   Gastrointestinal: Negative for abdominal pain, constipation, diarrhea, nausea and vomiting.   Genitourinary: Negative for dysuria, flank pain, frequency, hematuria, penile pain, penile swelling, scrotal swelling, testicular pain and urgency.   Musculoskeletal: Negative for arthralgias and back pain.   Neurological: Negative for dizziness and light-headedness.   Psychiatric/Behavioral: Negative for behavioral problems and confusion. The patient is not nervous/anxious.    All other systems reviewed and are negative.      Objective:      Vitals:    10/03/17 1346   BP: 118/70   Pulse: 60   Weight: 87.9 kg (193 lb 12.6 oz)   Height: 5' 5" (1.651 m)     Physical Exam   Nursing note and vitals reviewed.  Constitutional: He is oriented to person, place, and time. He appears well-developed and well-nourished.   HENT:   Head: Normocephalic.   Eyes: Conjunctivae are normal.   Neck: Normal range of motion. Neck supple. No tracheal deviation present. No thyromegaly present. "   Cardiovascular: Normal rate and normal heart sounds.    Pulmonary/Chest: Effort normal and breath sounds normal. No respiratory distress. He has no wheezes.   Abdominal: Soft. Bowel sounds are normal. There is no hepatosplenomegaly. There is no tenderness. There is no rebound and no CVA tenderness. No hernia.   Musculoskeletal: Normal range of motion. He exhibits no edema or tenderness.   Lymphadenopathy:     He has no cervical adenopathy.   Neurological: He is alert and oriented to person, place, and time.   Skin: Skin is warm and dry. No rash noted. No erythema.     Psychiatric: He has a normal mood and affect. His behavior is normal. Judgment and thought content normal.       Urine dipstick shows negative for all components.  Micro exam: negative for WBC's or RBC's.    Assessment:       1. BPH with obstruction/lower urinary tract symptoms    2. Nocturia more than twice per night    3. Groin rash          Plan:       1. BPH with obstruction/lower urinary tract symptoms  PSA next week    - tamsulosin (FLOMAX) 0.4 mg Cp24; Take 1 capsule (0.4 mg total) by mouth once daily.  Dispense: 90 capsule; Refill: 3    2. Nocturia more than twice per night  Limit evening fluids    3. Groin rash    - fluconazole (DIFLUCAN) 150 MG Tab; Take 1 tablet (150 mg total) by mouth once daily.  Dispense: 5 tablet; Refill: 5            Return in about 6 months (around 4/3/2018) for Follow up.

## 2017-10-04 ENCOUNTER — LAB VISIT (OUTPATIENT)
Dept: LAB | Facility: HOSPITAL | Age: 57
End: 2017-10-04
Attending: FAMILY MEDICINE
Payer: COMMERCIAL

## 2017-10-04 ENCOUNTER — PATIENT MESSAGE (OUTPATIENT)
Dept: FAMILY MEDICINE | Facility: CLINIC | Age: 57
End: 2017-10-04

## 2017-10-04 ENCOUNTER — OUTPATIENT CASE MANAGEMENT (OUTPATIENT)
Dept: ADMINISTRATIVE | Facility: OTHER | Age: 57
End: 2017-10-04

## 2017-10-04 DIAGNOSIS — N40.1 BPH WITH OBSTRUCTION/LOWER URINARY TRACT SYMPTOMS: ICD-10-CM

## 2017-10-04 DIAGNOSIS — N13.8 BPH WITH OBSTRUCTION/LOWER URINARY TRACT SYMPTOMS: ICD-10-CM

## 2017-10-04 DIAGNOSIS — R29.898 RUE WEAKNESS: Primary | ICD-10-CM

## 2017-10-04 LAB
ALBUMIN SERPL BCP-MCNC: 3.6 G/DL
ALP SERPL-CCNC: 115 U/L
ALT SERPL W/O P-5'-P-CCNC: 32 U/L
ANION GAP SERPL CALC-SCNC: 13 MMOL/L
AST SERPL-CCNC: 23 U/L
BILIRUB SERPL-MCNC: 0.3 MG/DL
BUN SERPL-MCNC: 21 MG/DL
CALCIUM SERPL-MCNC: 10 MG/DL
CHLORIDE SERPL-SCNC: 101 MMOL/L
CHOLEST SERPL-MCNC: 197 MG/DL
CHOLEST/HDLC SERPL: 5.8 {RATIO}
CO2 SERPL-SCNC: 19 MMOL/L
COMPLEXED PSA SERPL-MCNC: 0.32 NG/ML
CREAT SERPL-MCNC: 1.3 MG/DL
EST. GFR  (AFRICAN AMERICAN): >60 ML/MIN/1.73 M^2
EST. GFR  (NON AFRICAN AMERICAN): >60 ML/MIN/1.73 M^2
ESTIMATED AVG GLUCOSE: 263 MG/DL
GLUCOSE SERPL-MCNC: 274 MG/DL
HBA1C MFR BLD HPLC: 10.8 %
HDLC SERPL-MCNC: 34 MG/DL
HDLC SERPL: 17.3 %
LDLC SERPL CALC-MCNC: ABNORMAL MG/DL
NONHDLC SERPL-MCNC: 163 MG/DL
POTASSIUM SERPL-SCNC: 4.3 MMOL/L
PROT SERPL-MCNC: 7.9 G/DL
SODIUM SERPL-SCNC: 133 MMOL/L
TRIGL SERPL-MCNC: 1065 MG/DL

## 2017-10-04 PROCEDURE — 84153 ASSAY OF PSA TOTAL: CPT

## 2017-10-04 PROCEDURE — 83036 HEMOGLOBIN GLYCOSYLATED A1C: CPT

## 2017-10-04 PROCEDURE — 36415 COLL VENOUS BLD VENIPUNCTURE: CPT

## 2017-10-04 PROCEDURE — 80053 COMPREHEN METABOLIC PANEL: CPT

## 2017-10-04 PROCEDURE — 80061 LIPID PANEL: CPT

## 2017-10-04 NOTE — PROGRESS NOTES
This CSW received a referral on the above patient.   Reason for referral:Uncontrolled Type 2 Diabetes  Name of the community Resource that was provided:[employer], [place] Medicaid Application, SNAP Application, [facility], [facility], [facility], Resource Human Development , Health   Resource given to: Patient  via US Mail and Telephone    This CSW completed assessment with patient . Patient reports he live with a room mate. Patient reports he is able to complete all ADL's. Patient denied needing assistance with food, shelter medical or medicine. Patient reports he will be losing his job as well as health insurance within the next couple of days. CSW provided patient with resources that will assist patient in the future such as: food christopher,  SNAP, transit application, and RX assistance. Patient reports he has all diabetic materials and medication. Patient denied Diabetes Education Teaching. CSW encouraged patient to speak with Employer Human Resource Department regarding the termination of coverage. CSW provided patient with a Medicaid application and [facility] to assist with coverage laps. Patient denied employment resources. Patient reports he will be applying for disability.  Patient reports his partner recently passed away as well as father within the past two years. CSW provided grief counseling information to patient. CSW completed a PHQ screening on patient . Patient scored an 18. Patient denied SI or HI. Patient reports he is actively on an anti depressant. Patient is open to receiving counseling resources.CSW provided additional counseling resources.  CSW mailed all resources and provided her contact information for any additional needs.

## 2017-10-05 ENCOUNTER — TELEPHONE (OUTPATIENT)
Dept: FAMILY MEDICINE | Facility: CLINIC | Age: 57
End: 2017-10-05

## 2017-10-05 PROBLEM — E11.21 DIABETIC NEPHROPATHY ASSOCIATED WITH TYPE 2 DIABETES MELLITUS: Status: ACTIVE | Noted: 2017-10-05

## 2017-10-05 NOTE — TELEPHONE ENCOUNTER
Notified patient of information below. Verbalized understanding . Pt already scheduled for 10/18/17. PCP notified

## 2017-10-05 NOTE — TELEPHONE ENCOUNTER
----- Message from Hira Acosta MD sent at 10/4/2017  5:03 PM CDT -----  Please notify patient results are abnormal. Nothing emergent needs to be done. Please have the patient follow up with ANNEL in 1 week to discuss if not already scheduled in this time frame. Thanks.

## 2017-10-05 NOTE — TELEPHONE ENCOUNTER
----- Message from Hira Acosta MD sent at 10/5/2017  8:16 AM CDT -----  Please notify patient results are abnormal and show worsening kidney damage from his uncontrolled diabetes. Please assure that pt is following up with Dr Mills in 1 week to figure out a plan for hsi treatment over the next 6 months.

## 2017-10-05 NOTE — TELEPHONE ENCOUNTER
Notified patient of information below. Verbalized understanding. Pt already scheduled for 10/18/17. Notified PCP

## 2017-10-09 ENCOUNTER — PATIENT MESSAGE (OUTPATIENT)
Dept: ADMINISTRATIVE | Facility: OTHER | Age: 57
End: 2017-10-09

## 2017-10-13 ENCOUNTER — TELEPHONE (OUTPATIENT)
Dept: FAMILY MEDICINE | Facility: CLINIC | Age: 57
End: 2017-10-13

## 2017-10-16 ENCOUNTER — TELEPHONE (OUTPATIENT)
Dept: FAMILY MEDICINE | Facility: CLINIC | Age: 57
End: 2017-10-16

## 2017-10-30 ENCOUNTER — OFFICE VISIT (OUTPATIENT)
Dept: NEUROLOGY | Facility: CLINIC | Age: 57
End: 2017-10-30
Payer: COMMERCIAL

## 2017-10-30 VITALS
HEART RATE: 75 BPM | SYSTOLIC BLOOD PRESSURE: 116 MMHG | BODY MASS INDEX: 32.4 KG/M2 | HEIGHT: 65 IN | WEIGHT: 194.44 LBS | DIASTOLIC BLOOD PRESSURE: 69 MMHG

## 2017-10-30 DIAGNOSIS — R53.1 RIGHT SIDED WEAKNESS: ICD-10-CM

## 2017-10-30 DIAGNOSIS — E11.21 DIABETIC NEPHROPATHY ASSOCIATED WITH TYPE 2 DIABETES MELLITUS: Primary | ICD-10-CM

## 2017-10-30 PROCEDURE — 99213 OFFICE O/P EST LOW 20 MIN: CPT | Mod: S$GLB,,, | Performed by: PSYCHIATRY & NEUROLOGY

## 2017-10-30 PROCEDURE — 99999 PR PBB SHADOW E&M-EST. PATIENT-LVL III: CPT | Mod: PBBFAC,,, | Performed by: PSYCHIATRY & NEUROLOGY

## 2017-10-30 RX ORDER — CALCIUM CITRATE/VITAMIN D3 200MG-6.25
TABLET ORAL
Refills: 0 | COMMUNITY
Start: 2017-07-31 | End: 2018-08-13

## 2017-10-30 RX ORDER — INSULIN GLARGINE 300 U/ML
INJECTION, SOLUTION SUBCUTANEOUS
COMMUNITY
Start: 2017-10-20 | End: 2018-04-10

## 2017-10-30 NOTE — PATIENT INSTRUCTIONS
Discussed with patient.  In view of the fact that his blood sugars have been inadequately controlled, the possibility of a cerebrovascular event needs to be ruled out.  Will get an MRI scan of the brain, noncontrast.  We'll also get an EMG/NCS right upper and lower extremity to rule out symptoms related to diabetic neuropathy versus plexopathy/radiculopathy.  Advised patient to continue follow-up with endocrinology for control of diabetes.  The patient will be seen by me in follow-up after workup is completed.

## 2017-10-30 NOTE — PROGRESS NOTES
Subjective:       Patient ID: Cortes Schroeder is a 57 y.o. male.    Chief Complaint:  Right arm weakness and Right leg numbness      History of Present Illness  HPI   This is a 57-year-old male who was referred for evaluation of right arm tingling and numbness with sharp pains that have been going on intermittently for about a year however he has noted that in the last few months he has been having weakness of the right arm especially when he is using it worked around the house such as using a drill and also while driving.  He occasionally has neck pain as well.  He denies any history of trauma.     In addition he has had right leg pain and weakness especially noted with standing or walking for long when he tends to drag the right foot.  He also has occasional right leg pain described as sharp and shooting from knee down with occasional cramping.  He does have occasional symptoms on the left however these are infrequent and not the same intensity.  He does have numbness in his feet.  He has very occasional low back pain.  He has no history of trauma.  The patient denies any bowel or bladder difficulties.  He has had no headaches or visual difficulties.  He has a history of diabetes that is poorly controlled with average morning blood sugars around 300+.  His last hemoglobin A1c was 10.8.  He is presently being followed by endocrinology.  He denies any history of stroke like symptoms in the past.  He does admit to being under significant stress related to taking care of his father had Alzheimers and recently , as well as his partner with problems related to Parkinsons disease and dementia who also recently .  In addition he was laid off from work about 2 years ago.  He was primarily computer and IT work.       Review of Systems  Review of Systems   HENT: Negative.  Negative for hearing loss.    Eyes: Negative.  Negative for visual disturbance.   Respiratory: Negative.  Negative for shortness of breath.     Cardiovascular: Negative.  Negative for chest pain and palpitations.   Gastrointestinal: Negative.    Endocrine: Negative.    Genitourinary: Negative.    Musculoskeletal: Positive for neck pain. Negative for back pain and gait problem.   Skin: Negative.    Allergic/Immunologic: Negative.    Neurological: Positive for weakness and numbness. Negative for dizziness, tremors, seizures, syncope, speech difficulty and headaches.   Hematological: Negative.    Psychiatric/Behavioral: Negative.  Negative for decreased concentration and sleep disturbance.       Objective:      Neurologic Exam     Mental Status   Oriented to person, place, and time.   Registration: recalls 3 of 3 objects. Follows 3 step commands.   Attention: normal. Concentration: normal.   Speech: speech is normal   Level of consciousness: alert  Knowledge: good.   Able to name object. Able to read. Able to repeat. Able to write. Normal comprehension.     Cranial Nerves   Cranial nerves II through XII intact.     Motor Exam   Muscle bulk: normal  Overall muscle tone: normal    Strength   Strength 5/5 except as noted. Mild weakness affecting triceps and hand extensors on the right.  Tinel's sign negative bilaterally.  No obvious weakness in the lower extremities noted.     Sensory Exam   Right arm light touch: normal  Left arm light touch: normal  Right leg light touch: decreased from ankle  Left leg light touch: decreased from ankle  Proprioception normal.   Right arm pinprick: normal  Left arm pinprick: normal  Right leg pinprick: decreased from ankle  Left leg pinprick: decreased from ankle    Gait, Coordination, and Reflexes     Gait  Gait: normal    Coordination   Romberg: negative  Finger to nose coordination: normal    Tremor   Resting tremor: absent  Intention tremor: absent  Action tremor: absent    Reflexes   Right brachioradialis: 0  Left brachioradialis: 0  Right biceps: 1+  Left biceps: 1+  Right triceps: 1+  Left triceps: 1+  Right patellar:  1+  Left patellar: 1+  Right achilles: 0  Left achilles: 0  Right plantar: normal  Left plantar: normal      Physical Exam   Constitutional: He is oriented to person, place, and time. He appears well-developed and well-nourished.   HENT:   Head: Normocephalic and atraumatic.   Neck: Neck supple. Carotid bruit is not present.   Neurological: He is oriented to person, place, and time. He has a normal Finger-Nose-Finger Test and a normal Romberg Test. Gait normal.   Reflex Scores:       Tricep reflexes are 1+ on the right side and 1+ on the left side.       Bicep reflexes are 1+ on the right side and 1+ on the left side.       Brachioradialis reflexes are 0 on the right side and 0 on the left side.       Patellar reflexes are 1+ on the right side and 1+ on the left side.       Achilles reflexes are 0 on the right side and 0 on the left side.  Psychiatric: His speech is normal.   Vitals reviewed.        Assessment:        1. Diabetic nephropathy associated with type 2 diabetes mellitus    2. Right sided weakness            Plan:       Discussed with patient.  In view of the fact that his blood sugars have been inadequately controlled, the possibility of a cerebrovascular event needs to be ruled out.  Will get an MRI scan of the brain, noncontrast.  We'll also get an EMG/NCS right upper and lower extremity to rule out symptoms related to diabetic neuropathy versus plexopathy/radiculopathy.  Advised patient to continue follow-up with endocrinology for control of diabetes.  The patient will be seen by me in follow-up after workup is completed.

## 2017-11-06 ENCOUNTER — HOSPITAL ENCOUNTER (OUTPATIENT)
Dept: RADIOLOGY | Facility: HOSPITAL | Age: 57
Discharge: HOME | End: 2017-11-06
Attending: PSYCHIATRY & NEUROLOGY
Payer: MEDICAID

## 2017-11-06 DIAGNOSIS — R53.1 RIGHT SIDED WEAKNESS: ICD-10-CM

## 2017-11-06 PROCEDURE — 70551 MRI BRAIN STEM W/O DYE: CPT | Mod: TC

## 2017-11-06 PROCEDURE — 70551 MRI BRAIN STEM W/O DYE: CPT | Mod: 26,,, | Performed by: RADIOLOGY

## 2017-11-15 NOTE — TELEPHONE ENCOUNTER
LM informing pt correct prescription sent in to pharmacy   This HST performed using a Noxturnal T3 device which recorded snore, sound, movement activity, body position, nasal pressure, oronasal thermal airflow, pulse, oximetry and both chest and abdominal respiratory effort. HST data was confined to the time patients states they were in bed.   Patient was score using 4% rules. Patient respiratory events showed an AHI 4.0 with audible loud snoring. Patient SP02 below 89% was 23.0 minutes. Overall signal quality was good.    Pt will follow up with sleep provider to determine appropriate therapy.

## 2017-11-20 ENCOUNTER — PATIENT MESSAGE (OUTPATIENT)
Dept: NEUROLOGY | Facility: CLINIC | Age: 57
End: 2017-11-20

## 2017-11-20 DIAGNOSIS — E11.42 DIABETIC PERIPHERAL NEUROPATHY ASSOCIATED WITH TYPE 2 DIABETES MELLITUS: ICD-10-CM

## 2017-12-05 RX ORDER — GABAPENTIN 100 MG/1
100-200 CAPSULE ORAL NIGHTLY
Qty: 60 CAPSULE | Refills: 3 | Status: SHIPPED | OUTPATIENT
Start: 2017-12-05 | End: 2018-02-26 | Stop reason: SDUPTHER

## 2017-12-07 ENCOUNTER — OFFICE VISIT (OUTPATIENT)
Dept: NEUROLOGY | Facility: CLINIC | Age: 57
End: 2017-12-07
Payer: MEDICAID

## 2017-12-07 VITALS
SYSTOLIC BLOOD PRESSURE: 125 MMHG | DIASTOLIC BLOOD PRESSURE: 78 MMHG | HEIGHT: 65 IN | BODY MASS INDEX: 32.51 KG/M2 | HEART RATE: 71 BPM | WEIGHT: 195.13 LBS

## 2017-12-07 DIAGNOSIS — M54.9 BACK PAIN, UNSPECIFIED BACK LOCATION, UNSPECIFIED BACK PAIN LATERALITY, UNSPECIFIED CHRONICITY: ICD-10-CM

## 2017-12-07 DIAGNOSIS — G54.2 CERVICAL SYNDROME: Primary | ICD-10-CM

## 2017-12-07 PROCEDURE — 99999 PR PBB SHADOW E&M-EST. PATIENT-LVL III: CPT | Mod: PBBFAC,,, | Performed by: PSYCHIATRY & NEUROLOGY

## 2017-12-07 PROCEDURE — 99213 OFFICE O/P EST LOW 20 MIN: CPT | Mod: PBBFAC | Performed by: PSYCHIATRY & NEUROLOGY

## 2017-12-07 PROCEDURE — 99214 OFFICE O/P EST MOD 30 MIN: CPT | Mod: S$PBB,,, | Performed by: PSYCHIATRY & NEUROLOGY

## 2017-12-07 NOTE — PATIENT INSTRUCTIONS
Control of diabetes is stressed.  Will get an MRI scan of the cervical spine, noncontrast and contact patient after results are available regarding further management.  He has been advised to continue with gabapentin.

## 2017-12-07 NOTE — PROGRESS NOTES
Subjective:       Patient ID: Cortes Schroeder is a 57 y.o. male.    Chief Complaint:  Neck Pain      History of Present Illness  HPI   This is a 57-year-old male who had been seen by me with complaints of right arm tingling and numbness with sharp pains that have been going on intermittently for about a year however he has noted that in the last few months he has been having increasing problems.  An MRI scan of the brain done was unremarkable.  He was advised to get an MRI scan of cervical spine however held off because of insurance change.    He has had chronic low back problems in the past had been followed by pain management.  He also has a history of diabetes that had not been adequately controlled.  He does report that he is now back on his medications and is keeping track of his blood sugars.  He does have numbness in his feet related to the peripheral neuropathy.  He denies any history of recent trauma.       Review of Systems  Review of Systems   HENT: Negative.  Negative for hearing loss.    Eyes: Negative.  Negative for visual disturbance.   Respiratory: Negative.  Negative for shortness of breath.    Cardiovascular: Negative.  Negative for chest pain and palpitations.   Gastrointestinal: Negative.    Endocrine: Negative.    Genitourinary: Negative.    Musculoskeletal: Positive for neck pain. Negative for back pain and gait problem.   Skin: Negative.    Allergic/Immunologic: Negative.    Neurological: Positive for weakness and numbness. Negative for dizziness, tremors, seizures, syncope, speech difficulty and headaches.   Hematological: Negative.    Psychiatric/Behavioral: Negative.  Negative for decreased concentration and sleep disturbance.       Objective:      Neurologic Exam     Mental Status   Oriented to person, place, and time.   Registration: recalls 3 of 3 objects. Follows 3 step commands.   Attention: normal. Concentration: normal.   Speech: speech is normal   Level of consciousness:  alert  Knowledge: good.   Able to name object. Able to read. Able to repeat. Able to write. Normal comprehension.     Cranial Nerves   Cranial nerves II through XII intact.     Motor Exam   Muscle bulk: normal  Overall muscle tone: normal    Strength   Strength 5/5 except as noted. Mild weakness affecting triceps and hand extensors on the right.  Tinel's sign negative bilaterally.  No obvious weakness in the lower extremities noted.     Sensory Exam   Right arm light touch: normal  Left arm light touch: normal  Right leg light touch: decreased from ankle  Left leg light touch: decreased from ankle  Proprioception normal.   Right arm pinprick: normal  Left arm pinprick: normal  Right leg pinprick: decreased from ankle  Left leg pinprick: decreased from ankle    Gait, Coordination, and Reflexes     Gait  Gait: normal    Coordination   Romberg: negative  Finger to nose coordination: normal    Tremor   Resting tremor: absent  Intention tremor: absent  Action tremor: absent    Reflexes   Right brachioradialis: 0  Left brachioradialis: 0  Right biceps: 1+  Left biceps: 1+  Right triceps: 1+  Left triceps: 1+  Right patellar: 1+  Left patellar: 1+  Right achilles: 0  Left achilles: 0  Right plantar: normal  Left plantar: normal      Physical Exam   Constitutional: He is oriented to person, place, and time. He appears well-developed and well-nourished.   HENT:   Head: Normocephalic and atraumatic.   Neck: Neck supple. Carotid bruit is not present.   Neurological: He is oriented to person, place, and time. He has a normal Finger-Nose-Finger Test and a normal Romberg Test. Gait normal.   Reflex Scores:       Tricep reflexes are 1+ on the right side and 1+ on the left side.       Bicep reflexes are 1+ on the right side and 1+ on the left side.       Brachioradialis reflexes are 0 on the right side and 0 on the left side.       Patellar reflexes are 1+ on the right side and 1+ on the left side.       Achilles reflexes are 0 on  the right side and 0 on the left side.  Psychiatric: His speech is normal.   Vitals reviewed.        Assessment:        1. Cervical syndrome    2. Uncontrolled type 2 diabetes mellitus with diabetic polyneuropathy, with long-term current use of insulin    3. Back pain, unspecified back location, unspecified back pain laterality, unspecified chronicity            Plan:       Discussed with patient. Control of diabetes is stressed.  Will get an MRI scan of the cervical spine, noncontrast and contact patient after results are available regarding further management.  He has been advised to continue with gabapentin..

## 2017-12-12 ENCOUNTER — HOSPITAL ENCOUNTER (OUTPATIENT)
Dept: RADIOLOGY | Facility: HOSPITAL | Age: 57
Discharge: HOME | End: 2017-12-12
Attending: PSYCHIATRY & NEUROLOGY
Payer: MEDICAID

## 2017-12-12 DIAGNOSIS — G54.2 CERVICAL SYNDROME: ICD-10-CM

## 2017-12-12 PROCEDURE — 72141 MRI NECK SPINE W/O DYE: CPT | Mod: TC

## 2017-12-12 PROCEDURE — 72141 MRI NECK SPINE W/O DYE: CPT | Mod: 26,,, | Performed by: RADIOLOGY

## 2017-12-28 ENCOUNTER — PATIENT MESSAGE (OUTPATIENT)
Dept: NEUROLOGY | Facility: CLINIC | Age: 57
End: 2017-12-28

## 2017-12-28 DIAGNOSIS — G89.4 CHRONIC PAIN SYNDROME: ICD-10-CM

## 2017-12-28 DIAGNOSIS — G54.2 CERVICAL SYNDROME: Primary | ICD-10-CM

## 2018-01-08 ENCOUNTER — TELEPHONE (OUTPATIENT)
Dept: FAMILY MEDICINE | Facility: CLINIC | Age: 58
End: 2018-01-08

## 2018-01-08 DIAGNOSIS — N40.1 BPH WITH OBSTRUCTION/LOWER URINARY TRACT SYMPTOMS: ICD-10-CM

## 2018-01-08 DIAGNOSIS — E78.2 MIXED HYPERLIPIDEMIA: ICD-10-CM

## 2018-01-08 DIAGNOSIS — R05.3 PERSISTENT COUGH: ICD-10-CM

## 2018-01-08 DIAGNOSIS — I10 HYPERTENSION, UNCONTROLLED: ICD-10-CM

## 2018-01-08 DIAGNOSIS — N13.8 BPH WITH OBSTRUCTION/LOWER URINARY TRACT SYMPTOMS: ICD-10-CM

## 2018-01-08 RX ORDER — TAMSULOSIN HYDROCHLORIDE 0.4 MG/1
0.4 CAPSULE ORAL DAILY
Qty: 90 CAPSULE | Refills: 3 | Status: SHIPPED | OUTPATIENT
Start: 2018-01-08 | End: 2018-04-10 | Stop reason: SDUPTHER

## 2018-01-08 RX ORDER — PROMETHAZINE HYDROCHLORIDE AND DEXTROMETHORPHAN HYDROBROMIDE 6.25; 15 MG/5ML; MG/5ML
5 SYRUP ORAL NIGHTLY PRN
Qty: 120 ML | Refills: 0 | OUTPATIENT
Start: 2018-01-08

## 2018-01-08 RX ORDER — PRAVASTATIN SODIUM 80 MG/1
80 TABLET ORAL DAILY
Qty: 90 TABLET | Refills: 3 | Status: SHIPPED | OUTPATIENT
Start: 2018-01-08 | End: 2018-11-06 | Stop reason: SDUPTHER

## 2018-01-08 RX ORDER — LOSARTAN POTASSIUM 50 MG/1
50 TABLET ORAL DAILY
Qty: 90 TABLET | Refills: 3 | Status: SHIPPED | OUTPATIENT
Start: 2018-01-08 | End: 2018-11-06 | Stop reason: SDUPTHER

## 2018-01-08 NOTE — TELEPHONE ENCOUNTER
LOV 10/02/2017  Last script for Pravastatin 7/10/2017 with 2 refills  Last script for Promethazine DM 3/31/2017  Last script for Losartan 3/31/2017 with 3 refills

## 2018-01-08 NOTE — TELEPHONE ENCOUNTER
Spoke with patient. Having a dry cough. Started about 1 1/2 weeks ago. Its off and on. Not productive. Remembers last time prescribed Promethazine it helped a lot.Please advise.

## 2018-01-09 RX ORDER — PROMETHAZINE HYDROCHLORIDE AND DEXTROMETHORPHAN HYDROBROMIDE 6.25; 15 MG/5ML; MG/5ML
5 SYRUP ORAL NIGHTLY PRN
Qty: 120 ML | Refills: 0 | Status: SHIPPED | OUTPATIENT
Start: 2018-01-09 | End: 2018-04-10

## 2018-01-10 ENCOUNTER — PROCEDURE VISIT (OUTPATIENT)
Dept: NEUROLOGY | Facility: CLINIC | Age: 58
End: 2018-01-10
Payer: MEDICAID

## 2018-01-10 DIAGNOSIS — E11.21 DIABETIC NEPHROPATHY ASSOCIATED WITH TYPE 2 DIABETES MELLITUS: ICD-10-CM

## 2018-01-10 PROCEDURE — 95911 NRV CNDJ TEST 9-10 STUDIES: CPT | Mod: PBBFAC,PO | Performed by: NEUROMUSCULOSKELETAL MEDICINE & OMM

## 2018-01-10 PROCEDURE — 95886 MUSC TEST DONE W/N TEST COMP: CPT | Mod: PBBFAC,PO | Performed by: NEUROMUSCULOSKELETAL MEDICINE & OMM

## 2018-01-10 PROCEDURE — 95908 NRV CNDJ TST 3-4 STUDIES: CPT | Mod: 26,S$PBB,, | Performed by: NEUROMUSCULOSKELETAL MEDICINE & OMM

## 2018-02-04 ENCOUNTER — PATIENT MESSAGE (OUTPATIENT)
Dept: NEUROLOGY | Facility: CLINIC | Age: 58
End: 2018-02-04

## 2018-02-26 DIAGNOSIS — E11.42 DIABETIC PERIPHERAL NEUROPATHY ASSOCIATED WITH TYPE 2 DIABETES MELLITUS: ICD-10-CM

## 2018-02-26 RX ORDER — GABAPENTIN 100 MG/1
CAPSULE ORAL
Qty: 60 CAPSULE | Refills: 3 | Status: SHIPPED | OUTPATIENT
Start: 2018-02-26 | End: 2018-05-26 | Stop reason: SDUPTHER

## 2018-02-28 ENCOUNTER — TELEPHONE (OUTPATIENT)
Dept: FAMILY MEDICINE | Facility: CLINIC | Age: 58
End: 2018-02-28

## 2018-02-28 NOTE — TELEPHONE ENCOUNTER
----- Message from Ines Sarabia sent at 2/28/2018 10:03 AM CST -----  Contact: self  Patient called stating that he was advised to follow up with Dr from [facility]. No OV were available until after 5/1. Please contact him at [phone].    Thanks!

## 2018-02-28 NOTE — TELEPHONE ENCOUNTER
Pt was in [facility] for ketoacidosis, he did follow-up with endocrinologist but was also advised to follow-up with Dr Acosta; I did schedule in next available open spot which is 3/12/18 at 10:20; I had to schedule as regular appointment, would not allow any hospital follow-up appointment until may and I was unable to override, also unable to change time to 40 minute

## 2018-03-09 ENCOUNTER — TELEPHONE (OUTPATIENT)
Dept: FAMILY MEDICINE | Facility: CLINIC | Age: 58
End: 2018-03-09

## 2018-03-09 NOTE — TELEPHONE ENCOUNTER
----- Message from Roseanne Tolliver sent at 3/9/2018 11:44 AM CST -----  Contact: self  Patient canceled an appointment and would like to reschedule for sometime next week as the Dr. Acosta is booked out a few months. Pt can be reached at [phone]. Thank you!

## 2018-03-15 ENCOUNTER — OFFICE VISIT (OUTPATIENT)
Dept: FAMILY MEDICINE | Facility: CLINIC | Age: 58
End: 2018-03-15
Payer: MEDICAID

## 2018-03-15 ENCOUNTER — TELEPHONE (OUTPATIENT)
Dept: FAMILY MEDICINE | Facility: CLINIC | Age: 58
End: 2018-03-15

## 2018-03-15 VITALS
TEMPERATURE: 98 F | HEART RATE: 75 BPM | OXYGEN SATURATION: 97 % | DIASTOLIC BLOOD PRESSURE: 70 MMHG | HEIGHT: 65 IN | RESPIRATION RATE: 18 BRPM | SYSTOLIC BLOOD PRESSURE: 112 MMHG | WEIGHT: 187.63 LBS | BODY MASS INDEX: 31.26 KG/M2

## 2018-03-15 DIAGNOSIS — Z79.4 TYPE 2 DIABETES MELLITUS WITH KETOACIDOSIS WITHOUT COMA, WITH LONG-TERM CURRENT USE OF INSULIN: Primary | ICD-10-CM

## 2018-03-15 DIAGNOSIS — R45.89 DEPRESSED MOOD: ICD-10-CM

## 2018-03-15 DIAGNOSIS — E11.10 TYPE 2 DIABETES MELLITUS WITH KETOACIDOSIS WITHOUT COMA, WITH LONG-TERM CURRENT USE OF INSULIN: Primary | ICD-10-CM

## 2018-03-15 PROCEDURE — 99999 PR PBB SHADOW E&M-EST. PATIENT-LVL IV: CPT | Mod: PBBFAC,,, | Performed by: FAMILY MEDICINE

## 2018-03-15 PROCEDURE — 99214 OFFICE O/P EST MOD 30 MIN: CPT | Mod: PBBFAC,PO | Performed by: FAMILY MEDICINE

## 2018-03-15 PROCEDURE — 99215 OFFICE O/P EST HI 40 MIN: CPT | Mod: S$PBB,,, | Performed by: FAMILY MEDICINE

## 2018-03-15 RX ORDER — BUPROPION HYDROCHLORIDE 150 MG/1
150 TABLET ORAL DAILY
Qty: 30 TABLET | Refills: 3 | Status: SHIPPED | OUTPATIENT
Start: 2018-03-15 | End: 2018-04-19

## 2018-03-15 NOTE — PROGRESS NOTES
"Subjective:       Patient ID: Cortes Schroeder is a 58 y.o. male.    Chief Complaint: Hospital Follow Up    [facility]    Hospital F/u DKA -     Fasting BG ~ 150s. Pt has significantly modified his diet which has helped his BG control immensely.     His weight is down since Dec by about 8 lbs.     Pt is currently on sitagliptin, metformin, Insulin pump with humalog managed by Dr. Jasmin HESS.     His test strips are extremely expensive, $800 for 400 strips.     Case management is helping with this.     Depressed mood - "I dont want to exist" at times. Int x 1 year. No SI ro HI. Worse with recent health problems.     He is working with [employer] [facility] for counseling groups.     Outpatient Prescriptions Marked as Taking for the 3/15/18 encounter (Office Visit) with Hira Acosta MD   Medication Sig Dispense Refill    blood sugar diagnostic (FREESTYLE INSULINX TEST STRIPS) Strp 1 strip by Misc.(Non-Drug; Combo Route) route 4 (four) times daily before meals and nightly. 200 strip 8    clotrimazole-betamethasone 1-0.05% (LOTRISONE) cream Apply topically 2 (two) times daily. 15 g 5    fenofibrate 160 MG Tab Take 1 tablet (160 mg total) by mouth once daily. 90 tablet 3    fish oil-omega-3 fatty acids 300-1,000 mg capsule Take by mouth 2 (two) times daily.      gabapentin (NEURONTIN) 100 MG capsule TAKE 1 TO 2 CAPSULES(100  MG) BY MOUTH EVERY EVENING 60 capsule 3    losartan (COZAAR) 50 MG tablet Take 1 tablet (50 mg total) by mouth once daily. 90 tablet 3    magnesium oxide-Mg AA chelate (MG-PLUS-PROTEIN) 133 mg Tab Take 500 mg by mouth every evening.       metformin (GLUCOPHAGE) 1000 MG tablet Take 1 tablet (1,000 mg total) by mouth 2 (two) times daily with meals. 180 tablet 3    pravastatin (PRAVACHOL) 80 MG tablet Take 1 tablet (80 mg total) by mouth once daily. 90 tablet 3    promethazine-dextromethorphan (PROMETHAZINE-DM) 6.25-15 mg/5 mL Syrp Take 5 mLs by mouth nightly as needed. 120 mL 0    " promethazine-dextromethorphan (PROMETHAZINE-DM) 6.25-15 mg/5 mL Syrp Take 5 mLs by mouth nightly as needed. 120 mL 0    SITagliptin (JANUVIA) 100 MG Tab Take 1 tablet (100 mg total) by mouth once daily. 90 tablet 3    syringe, disposable, 1 mL Syrg Use as directed 100 Syringe 11    tamsulosin (FLOMAX) 0.4 mg Cp24 Take 1 capsule (0.4 mg total) by mouth once daily. 90 capsule 3    TRUE METRIX GLUCOSE TEST STRIP Strp USE TID  0    vitamin D 1000 units Tab Take 185 mg by mouth 2 (two) times daily.          Past Medical History:   Diagnosis Date    Anxiety 12/18/2012    Back pain 12/18/2012    Cataract     Chronic pain syndrome 4/24/2016    Diabetes type 2, controlled 2/20/2016    Diabetic retinopathy     DM (diabetes mellitus) 12/18/2012    DM (diabetes mellitus), type 2, uncontrolled 11/16/2013    Essential hypertension 2/20/2016    Gastroesophageal reflux disease without esophagitis 2/20/2016    Hyperlipidemia 12/18/2012    Insomnia 8/7/2014    Neuropathy 11/16/2013       Family History   Problem Relation Age of Onset    Cataracts Father     Hypertension Father     Parkinsonism Father     Alzheimer's disease Father     Migraines Mother     Hypertension Mother     Heart attack Mother     Amblyopia Neg Hx     Blindness Neg Hx     Glaucoma Neg Hx     Macular degeneration Neg Hx     Retinal detachment Neg Hx     Strabismus Neg Hx         reports that he has never smoked. He has never used smokeless tobacco. He reports that he drinks about 0.6 oz of alcohol per week . He reports that he does not use drugs.    Review of Systems   Constitutional: Negative for activity change and unexpected weight change.   HENT: Positive for hearing loss and rhinorrhea. Negative for trouble swallowing.    Eyes: Positive for visual disturbance. Negative for discharge.   Respiratory: Negative for chest tightness and wheezing.    Cardiovascular: Negative for chest pain and palpitations.   Gastrointestinal:  Negative for blood in stool, constipation, diarrhea and vomiting.   Endocrine: Positive for polyuria. Negative for polydipsia.   Genitourinary: Positive for urgency. Negative for difficulty urinating and hematuria.   Musculoskeletal: Positive for arthralgias and neck pain. Negative for joint swelling.   Neurological: Positive for weakness and headaches.   Psychiatric/Behavioral: Positive for dysphoric mood. Negative for confusion.       Objective:     Vitals:    03/15/18 1320   BP: 112/70   Pulse: 75   Resp: 18   Temp: 98.1 °F (36.7 °C)        Physical Exam   Constitutional: He appears well-developed. No distress.   O   HENT:   Head: Normocephalic and atraumatic.   Eyes: Conjunctivae are normal. No scleral icterus.   Pulmonary/Chest: Effort normal.   Neurological: He is alert.   Psychiatric: His behavior is normal. His speech is not rapid and/or pressured, not delayed, not tangential and not slurred. He is not agitated, not aggressive, not hyperactive, not slowed, not withdrawn, not actively hallucinating and not combative. Thought content is not paranoid and not delusional. He exhibits a depressed mood. He expresses no homicidal and no suicidal ideation. He expresses no suicidal plans and no homicidal plans. He is communicative. He is attentive.   Nursing note and vitals reviewed.      Assessment:       1. Type 2 diabetes mellitus with ketoacidosis without coma, with long-term current use of insulin    2. Uncontrolled type 2 diabetes mellitus with diabetic polyneuropathy, with long-term current use of insulin    3. Depressed mood        Plan:       Cortes was seen today for hospital follow up.    Diagnoses and all orders for this visit:    Type 2 diabetes mellitus with ketoacidosis without coma, with long-term current use of insulin  Patient's blood sugars are much better controlled than they have ever been since I've been seeing him with his current therapy on insulin pump. Patient does have some difficulty  obtaining supplies which she is currently dressing with Dr. HESS and a care coordinator. Patient to continue current regimen.  Uncontrolled type 2 diabetes mellitus with diabetic polyneuropathy, with long-term current use of insulin    Depressed mood  -     buPROPion (WELLBUTRIN XL) 150 MG TB24 tablet; Take 1 tablet (150 mg total) by mouth once daily.  Patient's depression is currently now well managed on fluoxetine alone. I advised for him to continue to seek out counseling as he is already doing. I also advised increase his exercise also had Wellbutrin and symptoms progress his current therapy. Patient will let me know if he has any side effects or if he experiences any thoughts of hurting himself or someone else.          Follow-up in about 4 weeks (around 4/12/2018) for Depression.        Pt verbalized understanding and agreed with our plan.     At least 40 minutes were spent today with the patient in the office, which more than 50% of the time was spent on evaluation and counseling regarding The primary encounter diagnosis was Type 2 diabetes mellitus with ketoacidosis without coma, with long-term current use of insulin. Diagnoses of Uncontrolled type 2 diabetes mellitus with diabetic polyneuropathy, with long-term current use of insulin and Depressed mood were also pertinent to this visit..

## 2018-03-27 ENCOUNTER — OFFICE VISIT (OUTPATIENT)
Dept: OPTOMETRY | Facility: CLINIC | Age: 58
End: 2018-03-27
Payer: MEDICAID

## 2018-03-27 DIAGNOSIS — E11.3292 MILD NONPROLIFERATIVE DIABETIC RETINOPATHY OF LEFT EYE WITHOUT MACULAR EDEMA ASSOCIATED WITH TYPE 2 DIABETES MELLITUS: Primary | ICD-10-CM

## 2018-03-27 DIAGNOSIS — H25.13 NUCLEAR SCLEROSIS, BILATERAL: ICD-10-CM

## 2018-03-27 DIAGNOSIS — H04.121 DRY EYE SYNDROME, RIGHT: ICD-10-CM

## 2018-03-27 PROCEDURE — 99212 OFFICE O/P EST SF 10 MIN: CPT | Mod: PBBFAC,PO | Performed by: OPTOMETRIST

## 2018-03-27 PROCEDURE — 99999 PR PBB SHADOW E&M-EST. PATIENT-LVL II: CPT | Mod: PBBFAC,,, | Performed by: OPTOMETRIST

## 2018-03-27 PROCEDURE — 92014 COMPRE OPH EXAM EST PT 1/>: CPT | Mod: S$PBB,,, | Performed by: OPTOMETRIST

## 2018-03-27 NOTE — PROGRESS NOTES
HPI     Patient's age: 58 y.o.  Pt cc today of FBS/irritation/rare stabbing pain OD.  Not using Ats   frequently  Approximate date of last eye examination:  7/14/17  Name of last eye doctor seen: Dr. Mercedes    Pt states that he has been having a lot of pain in the right eye started   in October due to insurance didn't have coverage at that time.  Here today   to gwet it checked after visiting the PCP.  Also been having a lot of   headaches and the eye tearing quit a bit and they feel like a film over   them.  Pain scale today is at a three but goes as high as 10.    Wears glasses? yes     Wears CLs?:  no                ! OTC eyedrops currently using:  Systane - OU PRN   ! Prescription eye meds currently using:  none         Last edited by Pollo Mercedes, OD on 3/27/2018 11:54 AM. (History)        ROS     Positive for: Endocrine (DM)    Negative for: Constitutional, Gastrointestinal, Neurological, Skin,   Genitourinary, Musculoskeletal, HENT, Cardiovascular, Respiratory,   Psychiatric, Allergic/Imm, Heme/Lymph    Last edited by Pollo Mercedes, OD on 3/27/2018 11:54 AM. (History)        Assessment /Plan     For exam results, see Encounter Report.    Mild nonproliferative diabetic retinopathy of left eye without macular edema associated with type 2 diabetes mellitus    Nuclear sclerosis, bilateral    Dry eye syndrome, right        1. Small cats OD>OS  2. DM w Mild NPDR OS--stable  3. FBS/eye pain OD 2 to ELILS--advised SYS BAL or SOOTHE XP Ats QID/prn       Plan:    Pt will call if sx persist w Ats, and will try short course of FML.  Otherwise rtc 6 mos for full exam

## 2018-04-10 ENCOUNTER — OFFICE VISIT (OUTPATIENT)
Dept: UROLOGY | Facility: CLINIC | Age: 58
End: 2018-04-10
Payer: MEDICAID

## 2018-04-10 VITALS
HEART RATE: 62 BPM | BODY MASS INDEX: 30.74 KG/M2 | HEIGHT: 65 IN | DIASTOLIC BLOOD PRESSURE: 70 MMHG | SYSTOLIC BLOOD PRESSURE: 130 MMHG | WEIGHT: 184.5 LBS

## 2018-04-10 DIAGNOSIS — N40.1 BPH WITH OBSTRUCTION/LOWER URINARY TRACT SYMPTOMS: Primary | ICD-10-CM

## 2018-04-10 DIAGNOSIS — R35.1 NOCTURIA MORE THAN TWICE PER NIGHT: ICD-10-CM

## 2018-04-10 DIAGNOSIS — R33.9 INCOMPLETE EMPTYING OF BLADDER: ICD-10-CM

## 2018-04-10 DIAGNOSIS — N13.8 BPH WITH OBSTRUCTION/LOWER URINARY TRACT SYMPTOMS: Primary | ICD-10-CM

## 2018-04-10 DIAGNOSIS — N52.9 ED (ERECTILE DYSFUNCTION) OF ORGANIC ORIGIN: ICD-10-CM

## 2018-04-10 PROCEDURE — 81001 URINALYSIS AUTO W/SCOPE: CPT | Mod: PBBFAC | Performed by: UROLOGY

## 2018-04-10 PROCEDURE — 99999 PR PBB SHADOW E&M-EST. PATIENT-LVL III: CPT | Mod: PBBFAC,,, | Performed by: UROLOGY

## 2018-04-10 PROCEDURE — 99214 OFFICE O/P EST MOD 30 MIN: CPT | Mod: S$PBB,,, | Performed by: UROLOGY

## 2018-04-10 PROCEDURE — 99213 OFFICE O/P EST LOW 20 MIN: CPT | Mod: PBBFAC | Performed by: UROLOGY

## 2018-04-10 RX ORDER — PNV NO.95/FERROUS FUM/FOLIC AC 28MG-0.8MG
100 TABLET ORAL DAILY
COMMUNITY
End: 2022-02-09

## 2018-04-10 RX ORDER — INSULIN LISPRO 100 [IU]/ML
INJECTION, SOLUTION INTRAVENOUS; SUBCUTANEOUS
COMMUNITY
End: 2018-06-14 | Stop reason: SDUPTHER

## 2018-04-10 RX ORDER — TAMSULOSIN HYDROCHLORIDE 0.4 MG/1
0.4 CAPSULE ORAL DAILY
Qty: 90 CAPSULE | Refills: 3 | Status: SHIPPED | OUTPATIENT
Start: 2018-04-10 | End: 2018-05-10

## 2018-04-10 NOTE — PROGRESS NOTES
Subjective:       Patient ID: Cortes Schroeder is a 58 y.o. male who was last seen in this office Visit date not found    Chief Complaint:   Chief Complaint   Patient presents with    Follow-up     6 month f/u          History of Kidney Stones  He has never required a procedure.  He passed a stone in 2003. He passed another large stone last month.  He is here to bring it in for analysis.  He feels fine, no hematuria or dysuria or flank pain.    Erectile Dysfunction  Patient complains of erectile dysfunction. Onset of dysfunction was several years ago and was gradual in onset.  Patient states the nature of difficulty is both attaining and maintaining erection. Full erections occur never. Partial erections occur never. Libido is not affected. Risk factors for ED include diabetes mellitus. Patient denies history of pelvic radiation. Previous treatment of ED includes Viagra and Cialis and Trimix.  His  passed away in May 2017, he is not interested in medications at present.    Benign Prostatic Hyperplasia  He patient reports frequency and nocturia two times a night. He denies straining, urgency and weak stream. The patient states symptoms are of mild severity. Onset of symptoms was several years ago and was gradual in onset.  He has no personal history and no family history of prostate cancer. He reports a history of kidney stones. He denies flank pain, gross hematuria and recurrent UTI.  He is currently taking no prostate medications.    Groin Rash  He has used Diflucan in the past.  It is very itchy and sometimes bleeds from scratching too much.              ACTIVE MEDICAL ISSUES:  Patient Active Problem List   Diagnosis    Hyperlipidemia    Anxiety    Back pain    Neuropathy    Uncontrolled type 2 diabetes mellitus with diabetic polyneuropathy, with long-term current use of insulin    NPDR (nonproliferative diabetic retinopathy)    Insomnia    Gastroesophageal reflux disease without esophagitis     Hypertension, well controlled    Chronic pain syndrome    Diabetic nephropathy associated with type 2 diabetes mellitus    Right sided weakness    Cervical syndrome       ALLERGIES AND MEDICATIONS: updated and reviewed.  Review of patient's allergies indicates:   Allergen Reactions    Invokana [canagliflozin] Anaphylaxis    Percocet [oxycodone-acetaminophen] Nausea Only and Hallucinations    Biaxin [clarithromycin]     Hydrocodone Other (See Comments)     Dizzy/nausea/hallucinations    Sulfa (sulfonamide antibiotics) Nausea Only and Rash     Current Outpatient Prescriptions   Medication Sig    blood sugar diagnostic (FREESTYLE INSULINX TEST STRIPS) Strp 1 strip by Misc.(Non-Drug; Combo Route) route 4 (four) times daily before meals and nightly.    buPROPion (WELLBUTRIN XL) 150 MG TB24 tablet Take 1 tablet (150 mg total) by mouth once daily.    clotrimazole-betamethasone 1-0.05% (LOTRISONE) cream Apply topically 2 (two) times daily.    cyanocobalamin (VITAMIN B-12) 100 MCG tablet Take 100 mcg by mouth once daily.    fenofibrate 160 MG Tab Take 1 tablet (160 mg total) by mouth once daily.    fish oil-omega-3 fatty acids 300-1,000 mg capsule Take by mouth 2 (two) times daily.    gabapentin (NEURONTIN) 100 MG capsule TAKE 1 TO 2 CAPSULES(100  MG) BY MOUTH EVERY EVENING    insulin lispro (HUMALOG) 100 unit/mL injection Inject into the skin 3 (three) times daily before meals.    losartan (COZAAR) 50 MG tablet Take 1 tablet (50 mg total) by mouth once daily.    magnesium oxide-Mg AA chelate (MG-PLUS-PROTEIN) 133 mg Tab Take 500 mg by mouth every evening.     metformin (GLUCOPHAGE) 1000 MG tablet Take 1 tablet (1,000 mg total) by mouth 2 (two) times daily with meals.    pravastatin (PRAVACHOL) 80 MG tablet Take 1 tablet (80 mg total) by mouth once daily.    promethazine-dextromethorphan (PROMETHAZINE-DM) 6.25-15 mg/5 mL Syrp Take 5 mLs by mouth nightly as needed.    SITagliptin (JANUVIA) 100 MG  "Tab Take 1 tablet (100 mg total) by mouth once daily.    syringe, disposable, 1 mL Syrg Use as directed    tamsulosin (FLOMAX) 0.4 mg Cp24 Take 1 capsule (0.4 mg total) by mouth once daily.    TRUE METRIX GLUCOSE TEST STRIP Strp USE TID    vitamin D 1000 units Tab Take 185 mg by mouth 2 (two) times daily.     aspirin (ECOTRIN) 81 MG EC tablet Take 1 tablet (81 mg total) by mouth once daily.    fluoxetine (PROZAC) 40 MG capsule Take 1 capsule (40 mg total) by mouth 2 (two) times daily.     No current facility-administered medications for this visit.        Review of Systems   Constitutional: Negative for activity change, fatigue, fever and unexpected weight change.   HENT: Negative for congestion.    Eyes: Negative for redness.   Respiratory: Negative for chest tightness and shortness of breath.    Cardiovascular: Negative for chest pain and leg swelling.   Gastrointestinal: Negative for abdominal pain, constipation, diarrhea, nausea and vomiting.   Genitourinary: Negative for dysuria, flank pain, frequency, hematuria, penile pain, penile swelling, scrotal swelling, testicular pain and urgency.   Musculoskeletal: Negative for arthralgias and back pain.   Neurological: Negative for dizziness and light-headedness.   Psychiatric/Behavioral: Negative for behavioral problems and confusion. The patient is not nervous/anxious.    All other systems reviewed and are negative.      Objective:      Vitals:    04/10/18 1402   BP: 130/70   Pulse: 62   Weight: 83.7 kg (184 lb 8.4 oz)   Height: 5' 5" (1.651 m)     Physical Exam   Nursing note and vitals reviewed.  Constitutional: He is oriented to person, place, and time. He appears well-developed and well-nourished.   HENT:   Head: Normocephalic.   Eyes: Conjunctivae are normal.   Neck: Normal range of motion. No tracheal deviation present. No thyromegaly present.   Cardiovascular: Normal rate and normal heart sounds.    Pulmonary/Chest: Effort normal and breath sounds " normal. No respiratory distress. He has no wheezes.   Abdominal: Soft. He exhibits no distension and no mass. There is no hepatosplenomegaly. There is no tenderness. There is no rebound, no guarding and no CVA tenderness. No hernia. Hernia confirmed negative in the right inguinal area and confirmed negative in the left inguinal area.   Genitourinary: Rectum normal, testes normal and penis normal. Rectal exam shows no external hemorrhoid, no mass and no tenderness. Prostate is enlarged. Prostate is not tender. Right testis shows no mass and no tenderness. Left testis shows no mass and no tenderness.       Musculoskeletal: He exhibits no edema or tenderness.   Lymphadenopathy: No inguinal adenopathy noted on the right or left side.   Neurological: He is alert and oriented to person, place, and time.   Skin: Skin is warm and dry. No rash noted. No erythema.     Psychiatric: He has a normal mood and affect. His behavior is normal. Judgment and thought content normal.       Urine dipstick shows negative for all components.  Micro exam: negative for WBC's or RBC's.    Assessment:       1. BPH with obstruction/lower urinary tract symptoms    2. Nocturia more than twice per night    3. ED (erectile dysfunction) of organic origin    4. Incomplete emptying of bladder          Plan:       1. BPH with obstruction/lower urinary tract symptoms    - POCT urinalysis, dipstick or tablet reag  - Prostate Specific Antigen, Diagnostic; Future  - tamsulosin (FLOMAX) 0.4 mg Cp24; Take 1 capsule (0.4 mg total) by mouth once daily.  Dispense: 90 capsule; Refill: 3    2. Nocturia more than twice per night  Limit evening fluids    3. ED (erectile dysfunction) of organic origin  Trimix when needed    4. Incomplete emptying of bladder  stable            Follow-up in about 6 months (around 10/10/2018) for Follow up, Review PSA.

## 2018-04-12 DIAGNOSIS — F41.9 ANXIETY: ICD-10-CM

## 2018-04-12 RX ORDER — FLUOXETINE HYDROCHLORIDE 40 MG/1
40 CAPSULE ORAL 2 TIMES DAILY
Qty: 180 CAPSULE | Refills: 3 | Status: SHIPPED | OUTPATIENT
Start: 2018-04-12 | End: 2018-04-19

## 2018-04-19 ENCOUNTER — OFFICE VISIT (OUTPATIENT)
Dept: FAMILY MEDICINE | Facility: CLINIC | Age: 58
End: 2018-04-19
Payer: MEDICAID

## 2018-04-19 VITALS
BODY MASS INDEX: 31.04 KG/M2 | OXYGEN SATURATION: 96 % | RESPIRATION RATE: 16 BRPM | DIASTOLIC BLOOD PRESSURE: 66 MMHG | SYSTOLIC BLOOD PRESSURE: 120 MMHG | TEMPERATURE: 99 F | HEIGHT: 65 IN | WEIGHT: 186.31 LBS | HEART RATE: 80 BPM

## 2018-04-19 DIAGNOSIS — G89.4 CHRONIC PAIN SYNDROME: ICD-10-CM

## 2018-04-19 DIAGNOSIS — M54.2 CHRONIC NECK PAIN: ICD-10-CM

## 2018-04-19 DIAGNOSIS — G89.29 CHRONIC NECK PAIN: ICD-10-CM

## 2018-04-19 DIAGNOSIS — Z72.51 HIGH RISK SEXUAL BEHAVIOR: ICD-10-CM

## 2018-04-19 DIAGNOSIS — F32.A DEPRESSION, UNSPECIFIED DEPRESSION TYPE: Primary | ICD-10-CM

## 2018-04-19 PROCEDURE — 99213 OFFICE O/P EST LOW 20 MIN: CPT | Mod: PBBFAC,PO | Performed by: FAMILY MEDICINE

## 2018-04-19 PROCEDURE — 99999 PR PBB SHADOW E&M-EST. PATIENT-LVL III: CPT | Mod: PBBFAC,,, | Performed by: FAMILY MEDICINE

## 2018-04-19 PROCEDURE — 99214 OFFICE O/P EST MOD 30 MIN: CPT | Mod: S$PBB,,, | Performed by: FAMILY MEDICINE

## 2018-04-19 RX ORDER — DULOXETIN HYDROCHLORIDE 60 MG/1
60 CAPSULE, DELAYED RELEASE ORAL 2 TIMES DAILY
Qty: 60 CAPSULE | Refills: 3 | Status: SHIPPED | OUTPATIENT
Start: 2018-04-19 | End: 2018-04-30 | Stop reason: SDUPTHER

## 2018-04-19 NOTE — PROGRESS NOTES
Subjective:       Patient ID: Cortes Schroeder is a 58 y.o. male.    Chief Complaint: Depression (4 wks follow up ) and Pain (neck, both ear, and back since 9/2017 worsen)    HPI    Depression - Pt is engaged in counseling (since yesterday), but he feels that his depressive sxs are worsening - despite the addition of bupropion.   Some SI he admitted to over the last few years, but this has not worsened.     Chronic neck pain - Pts pain is constant, and worsening since I saw him last. He is scheduled to see NS next week.     Bilateral eye pain - followed by opthalmology - pain is severe when it occurs and feels like its coming from inside the eye itself.     Dm2 - pt is doing much better with the insulin pump! His fasting Bg are in the 130s or less often.           Outpatient Prescriptions Marked as Taking for the 4/19/18 encounter (Office Visit) with Hira Acosta MD   Medication Sig Dispense Refill    aspirin (ECOTRIN) 81 MG EC tablet Take 1 tablet (81 mg total) by mouth once daily.  0    blood sugar diagnostic (FREESTYLE INSULINX TEST STRIPS) Strp 1 strip by Misc.(Non-Drug; Combo Route) route 4 (four) times daily before meals and nightly. 200 strip 8    clotrimazole-betamethasone 1-0.05% (LOTRISONE) cream Apply topically 2 (two) times daily. 15 g 5    cyanocobalamin (VITAMIN B-12) 100 MCG tablet Take 100 mcg by mouth once daily.      fenofibrate 160 MG Tab Take 1 tablet (160 mg total) by mouth once daily. 90 tablet 3    fish oil-omega-3 fatty acids 300-1,000 mg capsule Take by mouth 2 (two) times daily.      gabapentin (NEURONTIN) 100 MG capsule TAKE 1 TO 2 CAPSULES(100  MG) BY MOUTH EVERY EVENING 60 capsule 3    insulin lispro (HUMALOG) 100 unit/mL injection Inject into the skin 3 (three) times daily before meals.      losartan (COZAAR) 50 MG tablet Take 1 tablet (50 mg total) by mouth once daily. 90 tablet 3    magnesium oxide-Mg AA chelate (MG-PLUS-PROTEIN) 133 mg Tab Take 500 mg by  mouth every evening.       metformin (GLUCOPHAGE) 1000 MG tablet Take 1 tablet (1,000 mg total) by mouth 2 (two) times daily with meals. 180 tablet 3    pravastatin (PRAVACHOL) 80 MG tablet Take 1 tablet (80 mg total) by mouth once daily. 90 tablet 3    PROPYLENE GLYCOL//PF (SYSTANE, PF, OPHT) Apply to eye 4 (four) times daily.      SITagliptin (JANUVIA) 100 MG Tab Take 1 tablet (100 mg total) by mouth once daily. 90 tablet 3    syringe, disposable, 1 mL Syrg Use as directed 100 Syringe 11    tamsulosin (FLOMAX) 0.4 mg Cp24 Take 1 capsule (0.4 mg total) by mouth once daily. 90 capsule 3    TRUE METRIX GLUCOSE TEST STRIP Strp USE TID  0    vitamin D 1000 units Tab Take 185 mg by mouth 2 (two) times daily.       [DISCONTINUED] buPROPion (WELLBUTRIN XL) 150 MG TB24 tablet Take 1 tablet (150 mg total) by mouth once daily. 30 tablet 3    [DISCONTINUED] FLUoxetine (PROZAC) 40 MG capsule Take 1 capsule (40 mg total) by mouth 2 (two) times daily. 180 capsule 3    [DISCONTINUED] promethazine-dextromethorphan (PROMETHAZINE-DM) 6.25-15 mg/5 mL Syrp Take 5 mLs by mouth nightly as needed. 120 mL 0       Past Medical History:   Diagnosis Date    Anxiety 12/18/2012    Back pain 12/18/2012    Cataract     Chronic pain syndrome 4/24/2016    Diabetes type 2, controlled 2/20/2016    Diabetic retinopathy     DM (diabetes mellitus) 12/18/2012    DM (diabetes mellitus), type 2, uncontrolled 11/16/2013    Essential hypertension 2/20/2016    Gastroesophageal reflux disease without esophagitis 2/20/2016    Hyperlipidemia 12/18/2012    Insomnia 8/7/2014    Neuropathy 11/16/2013       Family History   Problem Relation Age of Onset    Cataracts Father     Hypertension Father     Parkinsonism Father     Alzheimer's disease Father     Migraines Mother     Hypertension Mother     Heart attack Mother     Amblyopia Neg Hx     Blindness Neg Hx     Glaucoma Neg Hx     Macular degeneration Neg Hx     Retinal  detachment Neg Hx     Strabismus Neg Hx         reports that he has never smoked. He has never used smokeless tobacco. He reports that he drinks about 0.6 oz of alcohol per week . He reports that he does not use drugs.    Review of Systems   Constitutional: Negative for chills and fever.   Musculoskeletal: Positive for arthralgias, back pain and neck pain.   Psychiatric/Behavioral: Positive for dysphoric mood, sleep disturbance and suicidal ideas. Negative for self-injury. The patient is not nervous/anxious.        Objective:     Vitals:    04/19/18 1014   BP: 120/66   Pulse: 80   Resp: 16   Temp: 98.9 °F (37.2 °C)        Physical Exam   Constitutional: He appears well-developed. No distress.   HENT:   Head: Normocephalic and atraumatic.   Eyes: Conjunctivae are normal. No scleral icterus.   Pulmonary/Chest: Effort normal.   Neurological: He is alert.   Psychiatric: He has a normal mood and affect. His behavior is normal.   Nursing note and vitals reviewed.      Assessment:       1. Depression, unspecified depression type    2. Chronic pain syndrome    3. Chronic neck pain    4. Uncontrolled type 2 diabetes mellitus with diabetic polyneuropathy, with long-term current use of insulin    5. High risk sexual behavior        Plan:       Cortes was seen today for depression and pain.    Diagnoses and all orders for this visit:    Depression, unspecified depression type  -     DULoxetine (CYMBALTA) 60 MG capsule; Take 1 capsule (60 mg total) by mouth 2 (two) times daily.  In order to maintain or hopefully improve his depressive symptom control and in order to help with his chronic pain I will switch him from fluoxetine to Cymbalta 60 mg twice a day. I am sending the patient to see me early next week to make sure that he is doing well with the transition. Patient is to let me know immediately or report to the emergency room if he has any side effects or complications.    Pt education given on SI and HI. I explained that  these thoughts are pathological, and are a sign that immediate help is needed. I also explained that if pt ever feels these sxs, they are to reach out for help, a friend, family member, suicide hotline, a medical professional such as myself, or even an ED physician who are always available and are trained to help.     Chronic pain syndrome  -     DULoxetine (CYMBALTA) 60 MG capsule; Take 1 capsule (60 mg total) by mouth 2 (two) times daily.    Chronic neck pain  Chronic-worsening-patient is currently being followed by neurosurgery and has an evaluation coming up with them in a week or 2.    Uncontrolled type 2 diabetes mellitus with diabetic polyneuropathy, with long-term current use of insulin  Chronic-improving-patient has follow-up with endocrinology coming up in the next few weeks. He is doing well on his insulin pump.    High risk sexual behavior  -     Ambulatory referral to Infectious Disease  Patient requests a referral for discussing PReP.          Follow-up in about 1 week (around 4/26/2018) for Depression.      Pt verbalized understanding and agreed with our plan.

## 2018-04-20 ENCOUNTER — PATIENT MESSAGE (OUTPATIENT)
Dept: FAMILY MEDICINE | Facility: CLINIC | Age: 58
End: 2018-04-20

## 2018-04-20 ENCOUNTER — TELEPHONE (OUTPATIENT)
Dept: OTOLARYNGOLOGY | Facility: CLINIC | Age: 58
End: 2018-04-20

## 2018-04-20 NOTE — TELEPHONE ENCOUNTER
----- Message from Aracelis Schofield sent at 4/20/2018 10:19 AM CDT -----  Contact: Patient herself      Name of Who is Calling:  Cortes Schroeder (mrn# [id number])      What is the request in detail:  Patient called requesting to be seen in this clinic for difficulty hearing.  I did attempt to schedule per patient's request but was unsuccessful.  Please  Give a call back at your earliest convenience.    THANKS!       Can the clinic reply by [facility]:  No      What Number to Call Back [facility] not [facility]:  [phone]

## 2018-04-20 NOTE — TELEPHONE ENCOUNTER
Informed Mr Schroeder that Dr Perales is not in the office today. Offered Mr Schroeder an appointment with Dr Perales for Monday 4/23/18. He declined appointment and verbalizes his frustration with [facility] system and the scheduling process. Offered him assistance with scheduling appointment. He refused assistance and states [facility] and will find doctor on the [facility].

## 2018-04-27 ENCOUNTER — TELEPHONE (OUTPATIENT)
Dept: FAMILY MEDICINE | Facility: CLINIC | Age: 58
End: 2018-04-27

## 2018-04-27 DIAGNOSIS — E11.9 TYPE 2 DIABETES MELLITUS WITHOUT COMPLICATION: ICD-10-CM

## 2018-04-30 ENCOUNTER — OFFICE VISIT (OUTPATIENT)
Dept: SPINE | Facility: CLINIC | Age: 58
End: 2018-04-30
Payer: MEDICAID

## 2018-04-30 ENCOUNTER — PATIENT MESSAGE (OUTPATIENT)
Dept: FAMILY MEDICINE | Facility: CLINIC | Age: 58
End: 2018-04-30

## 2018-04-30 VITALS
BODY MASS INDEX: 29.99 KG/M2 | DIASTOLIC BLOOD PRESSURE: 63 MMHG | HEIGHT: 65 IN | WEIGHT: 180 LBS | SYSTOLIC BLOOD PRESSURE: 137 MMHG | HEART RATE: 87 BPM

## 2018-04-30 DIAGNOSIS — M54.17 LUMBOSACRAL RADICULOPATHY: ICD-10-CM

## 2018-04-30 DIAGNOSIS — G95.9 CERVICAL MYELOPATHY: Primary | ICD-10-CM

## 2018-04-30 DIAGNOSIS — F32.A DEPRESSION, UNSPECIFIED DEPRESSION TYPE: ICD-10-CM

## 2018-04-30 DIAGNOSIS — M54.2 CERVICALGIA: ICD-10-CM

## 2018-04-30 DIAGNOSIS — Z98.1 HISTORY OF LUMBAR FUSION: ICD-10-CM

## 2018-04-30 DIAGNOSIS — G89.4 CHRONIC PAIN SYNDROME: ICD-10-CM

## 2018-04-30 PROCEDURE — 99204 OFFICE O/P NEW MOD 45 MIN: CPT | Mod: S$PBB,,, | Performed by: NEUROLOGICAL SURGERY

## 2018-04-30 PROCEDURE — 99213 OFFICE O/P EST LOW 20 MIN: CPT | Mod: PBBFAC | Performed by: NEUROLOGICAL SURGERY

## 2018-04-30 PROCEDURE — 99999 PR PBB SHADOW E&M-EST. PATIENT-LVL III: CPT | Mod: PBBFAC,,, | Performed by: NEUROLOGICAL SURGERY

## 2018-04-30 RX ORDER — DULOXETIN HYDROCHLORIDE 60 MG/1
60 CAPSULE, DELAYED RELEASE ORAL DAILY
Qty: 30 CAPSULE | Refills: 3 | Status: SHIPPED | OUTPATIENT
Start: 2018-04-30 | End: 2018-08-20 | Stop reason: SDUPTHER

## 2018-04-30 NOTE — PROGRESS NOTES
"Dear Dr. Díaz,     Thank you for referring this patient to my clinic. The full details of my evaluation will also be forthcoming to you by letter.    CHIEF COMPLAINT:  Consult for neck pain    I, Nash Jimenez, attest that this documentation has been prepared under the direction and in the presence of Moe Dahl MD.    HPI:  Cortes Schroeder is a 58 y.o.  male with history of diabetes type 2 and CVA on aspirin 81 mg, who is referred to me by Bre for evaluation of neck pain and RUE paraesthesias. Pt reports neck pain and low back pain. He received a low back surgery in 2003 performed at [facility] [place].  His symptoms began as neck pain and right sided pain worst in his right arm. As the pain worsened, he began losing sensation in his RUE. In Feburary 2015, he states that he "flatlined". He was taking Invokana at the time. Pt's high mid back/neck pain is constant with no significant modifying factors. Pt's RUE pain originates in the shoulder and radiates to his wrist.  Pt notes intermittent right sided headaches and ear aches as well. He drops objects with his right hand which has been worsening. Pt also reports some balance difficulty but no falls at this point. Pt is right handed and worries about using a cane because of his right arm symptoms.   Pt states that he previous low back surgery was at approximately L4-S1. After the surgery he contracted a staph infection after returning to work. Pt's low back pain is now radiating into his hips and down his BLE, right worse than left. His RLE pain radiates down his anterior thigh to his medial calf. He describes the pain as a stabbing pain saying it sometimes feels like a cramp. He not participating in physical therapy for his neck or back.        Review of patient's allergies indicates:   Allergen Reactions    Invokana [canagliflozin] Anaphylaxis    Percocet [oxycodone-acetaminophen] Nausea Only and Hallucinations    Biaxin [clarithromycin]     Hydrocodone Other (See " Comments)     Dizzy/nausea/hallucinations    Sulfa (sulfonamide antibiotics) Nausea Only and Rash       Past Medical History:   Diagnosis Date    Anxiety 12/18/2012    Back pain 12/18/2012    Cataract     Chronic pain syndrome 4/24/2016    Diabetes type 2, controlled 2/20/2016    Diabetic retinopathy     DM (diabetes mellitus) 12/18/2012    DM (diabetes mellitus), type 2, uncontrolled 11/16/2013    Essential hypertension 2/20/2016    Gastroesophageal reflux disease without esophagitis 2/20/2016    Hyperlipidemia 12/18/2012    Insomnia 8/7/2014    Neuropathy 11/16/2013     Past Surgical History:   Procedure Laterality Date    BACK SURGERY  2003    Lumbar Spine     Family History   Problem Relation Age of Onset    Cataracts Father     Hypertension Father     Parkinsonism Father     Alzheimer's disease Father     Migraines Mother     Hypertension Mother     Heart attack Mother     Amblyopia Neg Hx     Blindness Neg Hx     Glaucoma Neg Hx     Macular degeneration Neg Hx     Retinal detachment Neg Hx     Strabismus Neg Hx      Social History   Substance Use Topics    Smoking status: Never Smoker    Smokeless tobacco: Never Used    Alcohol use 0.6 oz/week     1 Glasses of wine per week      Comment: occasionally        Review of Systems   HENT: Positive for ear pain (intermittent right sided).    Eyes: Negative.    Respiratory: Negative.    Cardiovascular: Negative.    Gastrointestinal: Negative.    Endocrine: Negative.    Genitourinary: Negative.    Musculoskeletal: Positive for back pain (low back; high mid back), myalgias (RUE) and neck pain. Negative for gait problem.   Skin: Negative.    Allergic/Immunologic: Negative.    Neurological: Positive for weakness (RLE; RUE) and headaches (intermittent right sided). Negative for light-headedness and numbness.        Right hand clumsiness  Balance difficulty   Hematological: Negative.    Psychiatric/Behavioral: Negative.        OBJECTIVE:    Vital Signs:  Pulse: 87 (04/30/18 0953)  BP: 137/63 (04/30/18 0953)    Physical Exam:    Vital signs: All nursing notes and vital signs reviewed -- afebrile, vital signs stable.  Constitutional: Patient sitting comfortably in chair. Appears well developed and well nourished.  Skin: Exposed areas are intact without abnormal markings, rashes or other lesions.  HEENT: Normocephalic. Normal conjunctivae.  Cardiovascular: Normal rate and regular rhythm.  Respiratory: Chest wall rises and falls symmetrically, without signs of respiratory distress.  Abdomen: Soft and non-tender.  Extremities: Warm and without edema. Calves supple, non-tender.  Psych/Behavior: Normal affect.    Neurological:    Mental status: Alert and oriented. Conversational and appropriate.       Cranial Nerves: Grossly intact.     Motor:    Upper:  Deltoids Triceps Biceps WE WF     R 5/5 5/5 5/5 5/5 5/5 5/5    L 5/5 5/5 5/5 5/5 5/5 5/5      Lower:  HF KE KF DF PF EHL    R 4/5 5/5 5/5 5/5 5/5 5/5    L 5/5 5/5 5/5 5/5 5/5 5/5     Sensory: Intact sensation to light touch in all extremities. Romberg positive.    Reflexes:          DTR: 3+ patellar, bilaterally.     Lazar's: Negative.     Babinski's: Negative.     Clonus: Negative.    Cerebellar: Finger-to-nose and rapid alternating movements normal. Slow antalgic gait favoring the right. Mild ataxic gait.    Spine:    Posture: Head well aligned over pelvis in front and side views.  No focal or global spinal deformity visible on inspection. Shoulders and hips even. No obvious leg length discrepancy. No scapula winging.    Bending: Full ROM with forward, back and lateral bending. No rib prominence with forward bend.    Cervical:      ROM: Full with flexion, extension, lateral rotation and ear-to-shoulder bend. Pain with flexion.     Midline TTP: Negative.     Spurling's test: Negative.     Lhermitte's: Negative.    Thoracic:     Midline TTP: Negative    Lumbar:     Midline TTP: Negative     Straight  Leg Test: Negative     Crossed Straight Leg Test: Negative     Sciatic notch tenderness: Negative.    Other:     SI joint TTP: Negative.     Greater trochanter TTP: Negative.     Tenderness with external/internal hip rotation: Negative.     Gowers' sign: Positive.      Diagnostic Results:  All imaging was independently reviewed by me.    EMG/Nerve conduction study, dated 1/10/2018:  1. Axonal neuropathy bilateral upper and lower extremities    MRI C-spine, dated 12/12/2017:  1. Diffuse spondylosis   2. Severe stenosis at C4/5 and C5/6 without T2 signal change    MRI Brain, dated 11/6/2017:  1. No acute stroke    ASSESSMENT/PLAN:     Cortes Schroeder has neck pain and symptoms of mild cervical myelopathy in the setting of C4-5 and C5-6 stenosis. Because his symptoms are mild and he has no cord edema, I have recommended that we follow for symptom progression. I have educated him on the symptoms of cervical myelopathy to monitor. We will rule out cervical instability with dynamic xrays today, and I will send him to PT.    Additionally he has axial back pain and radicular leg pain in both legs, right more than left. We do not have current lumbar imaging, so an MRI and dynamic xrays are indicated. We will do PT for this problem as well, and he will follow up with me in 3 months.    The patient understands and agrees with the plan of care. All questions were answered.     1. Flex/Ex C-spine   2. Flex/Ex L-spine  3. MRI L-spine  4. Referral to Physical Therapy  5. RTC 3 months       I, Dr. Moe Dahl personally performed the services described in this documentation. All medical record entries made by the scribe, Nash Jimenez, were at my direction and in my presence.  I have reviewed the chart and agree that the record reflects my personal performance and is accurate and complete.      Moe Dahl M.D.  Department of Neurosurgery  [facility]      .

## 2018-04-30 NOTE — LETTER
April 30, 2018      Jose Díaz MD  [address]  [address]  [facility] [address]           [facility]  [address], [address]  [facility] [address]  Phone: [phone]  Fax: [phone]          Patient: Cortes Schroeder   MR Number: [id number]   YOB: 1960   Date of Visit: 4/30/2018       Dear Dr. Jose Díaz:    Thank you for referring Cortes Schroeder to me for evaluation. Attached you will find relevant portions of my assessment and plan of care.    If you have questions, please do not hesitate to call me. I look forward to following Cortes Schroeder along with you.    Sincerely,    Moe Dahl MD    Enclosure  CC:  No Recipients    If you would like to receive this communication electronically, please contact [email or web address] or [phone] to request more information on [employer] access.    For providers and/or their staff who would like to refer a patient to [facility], please contact us through our one-stop-shop provider referral line, [facility], at [phone].    If you feel you have received this communication in error or would no longer like to receive these types of communications, please e-mail [email or web address]

## 2018-05-10 ENCOUNTER — LAB VISIT (OUTPATIENT)
Dept: LAB | Facility: HOSPITAL | Age: 58
End: 2018-05-10
Attending: FAMILY MEDICINE
Payer: MEDICAID

## 2018-05-10 ENCOUNTER — OFFICE VISIT (OUTPATIENT)
Dept: FAMILY MEDICINE | Facility: CLINIC | Age: 58
End: 2018-05-10
Payer: MEDICAID

## 2018-05-10 ENCOUNTER — CLINICAL SUPPORT (OUTPATIENT)
Dept: REHABILITATION | Facility: HOSPITAL | Age: 58
End: 2018-05-10
Attending: NEUROLOGICAL SURGERY
Payer: MEDICAID

## 2018-05-10 VITALS
HEIGHT: 65 IN | TEMPERATURE: 98 F | RESPIRATION RATE: 16 BRPM | OXYGEN SATURATION: 97 % | BODY MASS INDEX: 31.51 KG/M2 | DIASTOLIC BLOOD PRESSURE: 70 MMHG | SYSTOLIC BLOOD PRESSURE: 110 MMHG | HEART RATE: 97 BPM | WEIGHT: 189.13 LBS

## 2018-05-10 DIAGNOSIS — Z11.4 ENCOUNTER FOR SCREENING FOR HIV: ICD-10-CM

## 2018-05-10 DIAGNOSIS — M62.81 MUSCLE WEAKNESS: ICD-10-CM

## 2018-05-10 DIAGNOSIS — F41.9 ANXIETY: ICD-10-CM

## 2018-05-10 DIAGNOSIS — Z78.9 IMPAIRED MOTOR CONTROL: ICD-10-CM

## 2018-05-10 DIAGNOSIS — G89.29 CHRONIC BILATERAL LOW BACK PAIN WITHOUT SCIATICA: ICD-10-CM

## 2018-05-10 DIAGNOSIS — E11.21 DIABETIC NEPHROPATHY ASSOCIATED WITH TYPE 2 DIABETES MELLITUS: ICD-10-CM

## 2018-05-10 DIAGNOSIS — M54.50 CHRONIC BILATERAL LOW BACK PAIN WITHOUT SCIATICA: ICD-10-CM

## 2018-05-10 DIAGNOSIS — M25.619 DECREASED RANGE OF MOTION OF SHOULDER, UNSPECIFIED LATERALITY: ICD-10-CM

## 2018-05-10 DIAGNOSIS — M54.2 NECK PAIN: ICD-10-CM

## 2018-05-10 PROCEDURE — 99999 PR PBB SHADOW E&M-EST. PATIENT-LVL V: CPT | Mod: PBBFAC,,, | Performed by: FAMILY MEDICINE

## 2018-05-10 PROCEDURE — 99215 OFFICE O/P EST HI 40 MIN: CPT | Mod: PBBFAC,PO | Performed by: FAMILY MEDICINE

## 2018-05-10 PROCEDURE — 97162 PT EVAL MOD COMPLEX 30 MIN: CPT | Mod: PN

## 2018-05-10 PROCEDURE — 83036 HEMOGLOBIN GLYCOSYLATED A1C: CPT

## 2018-05-10 PROCEDURE — 86703 HIV-1/HIV-2 1 RESULT ANTBDY: CPT

## 2018-05-10 PROCEDURE — 36415 COLL VENOUS BLD VENIPUNCTURE: CPT | Mod: PO

## 2018-05-10 PROCEDURE — 99214 OFFICE O/P EST MOD 30 MIN: CPT | Mod: S$PBB,,, | Performed by: FAMILY MEDICINE

## 2018-05-10 NOTE — PROGRESS NOTES
Subjective:       Patient ID: Cortes Schroeder is a 58 y.o. male.    Chief Complaint: Depression (1 wk f/u )    HPI    Depression - Pt was recently switched to cymbalta and states that he is feeling better. He is taking it at night because he is taking it at night before he goes to bed.     No change in his chronic pain unfortunately.       Chronic neck pain - pt had pt evaluation today and was optimistic that it should help. Followd by Dr. Dahl.       DM2 - pt is looking for a medicaid provider because his endocrinologist no longer accepts medicaid.     Int HA - pt is on flomax but he is suspicious that this may be causing headaches. It is causing lack of ejaculation, so he would like to trial a period off of the medication.          Outpatient Prescriptions Marked as Taking for the 5/10/18 encounter (Office Visit) with Hira Acosta MD   Medication Sig Dispense Refill    aspirin (ECOTRIN) 81 MG EC tablet Take 1 tablet (81 mg total) by mouth once daily.  0    blood sugar diagnostic (FREESTYLE INSULINX TEST STRIPS) Strp 1 strip by Misc.(Non-Drug; Combo Route) route 4 (four) times daily before meals and nightly. 200 strip 8    clotrimazole-betamethasone 1-0.05% (LOTRISONE) cream Apply topically 2 (two) times daily. 15 g 5    cyanocobalamin (VITAMIN B-12) 100 MCG tablet Take 100 mcg by mouth once daily.      DULoxetine (CYMBALTA) 60 MG capsule Take 1 capsule (60 mg total) by mouth once daily. 30 capsule 3    fish oil-omega-3 fatty acids 300-1,000 mg capsule Take by mouth 2 (two) times daily.      gabapentin (NEURONTIN) 100 MG capsule TAKE 1 TO 2 CAPSULES(100  MG) BY MOUTH EVERY EVENING 60 capsule 3    insulin lispro (HUMALOG) 100 unit/mL injection Inject into the skin 3 (three) times daily before meals.      losartan (COZAAR) 50 MG tablet Take 1 tablet (50 mg total) by mouth once daily. 90 tablet 3    magnesium oxide-Mg AA chelate (MG-PLUS-PROTEIN) 133 mg Tab Take 500 mg by mouth every  evening.       metFORMIN (GLUCOPHAGE) 1000 MG tablet TAKE 1 TABLET(1000 MG) BY MOUTH TWICE DAILY WITH MEALS 180 tablet 0    pravastatin (PRAVACHOL) 80 MG tablet Take 1 tablet (80 mg total) by mouth once daily. 90 tablet 3    PROMETHAZINE/DEXTROMETHORPHAN (PROMETHAZINE-DM ORAL) Take by mouth as needed.      PROPYLENE GLYCOL//PF (SYSTANE, PF, OPHT) Apply to eye 4 (four) times daily.      SITagliptin (JANUVIA) 100 MG Tab Take 1 tablet (100 mg total) by mouth once daily. 90 tablet 3    syringe, disposable, 1 mL Syrg Use as directed 100 Syringe 11    TRUE METRIX GLUCOSE TEST STRIP Strp USE TID  0    vitamin D 1000 units Tab Take 185 mg by mouth 2 (two) times daily.       [DISCONTINUED] tamsulosin (FLOMAX) 0.4 mg Cp24 Take 1 capsule (0.4 mg total) by mouth once daily. 90 capsule 3       Past Medical History:   Diagnosis Date    Anxiety 12/18/2012    Back pain 12/18/2012    Cataract     Chronic pain syndrome 4/24/2016    Diabetes type 2, controlled 2/20/2016    Diabetic retinopathy     DM (diabetes mellitus) 12/18/2012    DM (diabetes mellitus), type 2, uncontrolled 11/16/2013    Essential hypertension 2/20/2016    Gastroesophageal reflux disease without esophagitis 2/20/2016    Hyperlipidemia 12/18/2012    Insomnia 8/7/2014    Neuropathy 11/16/2013       Family History   Problem Relation Age of Onset    Cataracts Father     Hypertension Father     Parkinsonism Father     Alzheimer's disease Father     Migraines Mother     Hypertension Mother     Heart attack Mother     Amblyopia Neg Hx     Blindness Neg Hx     Glaucoma Neg Hx     Macular degeneration Neg Hx     Retinal detachment Neg Hx     Strabismus Neg Hx         reports that he has never smoked. He has never used smokeless tobacco. He reports that he drinks about 0.6 oz of alcohol per week . He reports that he does not use drugs.    Review of Systems   Constitutional: Negative for activity change and unexpected weight change.    HENT: Positive for hearing loss. Negative for rhinorrhea and trouble swallowing.    Eyes: Positive for visual disturbance. Negative for discharge.   Respiratory: Negative for chest tightness and wheezing.    Cardiovascular: Negative for chest pain and palpitations.   Gastrointestinal: Negative for blood in stool, constipation, diarrhea and vomiting.   Endocrine: Negative for polydipsia and polyuria.   Genitourinary: Negative for difficulty urinating, hematuria and urgency.   Musculoskeletal: Positive for neck pain. Negative for arthralgias and joint swelling.   Neurological: Positive for headaches. Negative for weakness.   Psychiatric/Behavioral: Positive for dysphoric mood. Negative for confusion.       Objective:     Vitals:    05/10/18 1357   BP: 110/70   Pulse: 97   Resp: 16   Temp: 98.4 °F (36.9 °C)        Physical Exam   Constitutional: He appears well-developed. No distress.   O   HENT:   Head: Normocephalic and atraumatic.   Eyes: Conjunctivae are normal. No scleral icterus.   Pulmonary/Chest: Effort normal.   Neurological: He is alert.   Psychiatric: He has a normal mood and affect. His behavior is normal.   Nursing note and vitals reviewed.      Assessment:       1. Uncontrolled type 2 diabetes mellitus with diabetic polyneuropathy, with long-term current use of insulin    2. Diabetic nephropathy associated with type 2 diabetes mellitus    3. Encounter for screening for HIV    4. Anxiety        Plan:       Cortes was seen today for depression.    Diagnoses and all orders for this visit:    Uncontrolled type 2 diabetes mellitus with diabetic polyneuropathy, with long-term current use of insulin  -     Hemoglobin A1c; Future  -     Ambulatory referral to Endocrinology  -     Ambulatory Referral to Podiatry  Patient is in the search for a new endocrinologist as previous endocrinologist no longer accepts his insurance referral placed will check A1c today.    Diabetic nephropathy associated with type 2  diabetes mellitus  -     Hemoglobin A1c; Future    Encounter for screening for HIV  -     HIV-1 and HIV-2 antibodies; Future  Patient wished an HIV screening today as he intends to start prep in the future  Anxiety  Patient is a much better place this week. He seems much more disease less anxious and less depressed. He feels that Cymbalta has been helping improve these parameters but has not done much for his chronic low back pain.    CLBP - patient to continue Cymbalta as above and continue pursuing physical therapy. Followed by NS.           Follow-up in about 2 months (around 7/10/2018) for Diabetes Type 2.        Pt verbalized understanding and agreed with our plan.

## 2018-05-10 NOTE — PLAN OF CARE
Physical Therapy Evaluation    Name: Cortes Schroeder  Clinic Number: [id number]    Diagnosis:   Encounter Diagnoses   Name Primary?    Neck pain     Muscle weakness     Impaired motor control     Decreased range of motion of shoulder, unspecified laterality      Physician: Moe Dahl MD  Treatment Orders: PT Eval and Treat    Past Medical History:   Diagnosis Date    Anxiety 12/18/2012    Back pain 12/18/2012    Cataract     Chronic pain syndrome 4/24/2016    Diabetes type 2, controlled 2/20/2016    Diabetic retinopathy     DM (diabetes mellitus) 12/18/2012    DM (diabetes mellitus), type 2, uncontrolled 11/16/2013    Essential hypertension 2/20/2016    Gastroesophageal reflux disease without esophagitis 2/20/2016    Hyperlipidemia 12/18/2012    Insomnia 8/7/2014    Neuropathy 11/16/2013     Current Outpatient Prescriptions   Medication Sig    aspirin (ECOTRIN) 81 MG EC tablet Take 1 tablet (81 mg total) by mouth once daily.    blood sugar diagnostic (FREESTYLE INSULINX TEST STRIPS) Strp 1 strip by Misc.(Non-Drug; Combo Route) route 4 (four) times daily before meals and nightly.    clotrimazole-betamethasone 1-0.05% (LOTRISONE) cream Apply topically 2 (two) times daily.    cyanocobalamin (VITAMIN B-12) 100 MCG tablet Take 100 mcg by mouth once daily.    DULoxetine (CYMBALTA) 60 MG capsule Take 1 capsule (60 mg total) by mouth once daily.    fenofibrate 160 MG Tab Take 1 tablet (160 mg total) by mouth once daily.    fish oil-omega-3 fatty acids 300-1,000 mg capsule Take by mouth 2 (two) times daily.    gabapentin (NEURONTIN) 100 MG capsule TAKE 1 TO 2 CAPSULES(100  MG) BY MOUTH EVERY EVENING    insulin lispro (HUMALOG) 100 unit/mL injection Inject into the skin 3 (three) times daily before meals.    losartan (COZAAR) 50 MG tablet Take 1 tablet (50 mg total) by mouth once daily.    magnesium oxide-Mg AA chelate (MG-PLUS-PROTEIN) 133 mg Tab Take 500 mg by mouth every evening.      metFORMIN (GLUCOPHAGE) 1000 MG tablet TAKE 1 TABLET(1000 MG) BY MOUTH TWICE DAILY WITH MEALS    pravastatin (PRAVACHOL) 80 MG tablet Take 1 tablet (80 mg total) by mouth once daily.    PROMETHAZINE/DEXTROMETHORPHAN (PROMETHAZINE-DM ORAL) Take by mouth as needed.    PROPYLENE GLYCOL//PF (SYSTANE, PF, OPHT) Apply to eye 4 (four) times daily.    SITagliptin (JANUVIA) 100 MG Tab Take 1 tablet (100 mg total) by mouth once daily.    syringe, disposable, 1 mL Syrg Use as directed    tamsulosin (FLOMAX) 0.4 mg Cp24 Take 1 capsule (0.4 mg total) by mouth once daily.    TRUE METRIX GLUCOSE TEST STRIP Strp USE TID    vitamin D 1000 units Tab Take 185 mg by mouth 2 (two) times daily.      No current facility-administered medications for this visit.      Review of patient's allergies indicates:   Allergen Reactions    Invokana [canagliflozin] Anaphylaxis    Percocet [oxycodone-acetaminophen] Nausea Only and Hallucinations    Biaxin [clarithromycin]     Hydrocodone Other (See Comments)     Dizzy/nausea/hallucinations    Sulfa (sulfonamide antibiotics) Nausea Only and Rash     Precautions: Diabetic, Impaired hearing     Evaluation Date: 5/10/2018   Visit # authorized: 1  Authorization period: 5/30/2018    Kelly Kolb is a 58 y.o. male that presents to [facility] outpatient clinic secondary to neck and arm pain. Patient indicates that she is having problems with his right arm going numb and weakness. Patient indicates that there is pain going down the right side and the left is starting to help. Patient indicates his sleep is interrupted. Patient indicates that he is also having sharp pains in his right eye. Patient indicates that when the pain happens it almost sends him to his knees. Patient indicates that he has some degenration c3-C5. Patient indicates that his fusion is starting to go bad from 2003 from L3-L5 and it could be why his pain in his back. Patient Ketoacidotics, patient indicates that he  flatlined and could not walk. Patient indicates he went through therapy at [facility] to learn to walk again. Motor skills area of the brain was blown. Patient indicates that he had a stroke back in 2015. This las february was taken in a diabetes coma again with ketoacidotics. Patient indicates the doctors switched him to a insulin pump to control his sugar levels. Patient indicates that he tends to keep his eyes closed     *Patient has an insulin pump in left arm    Patient c/o: continuous/intermittent symptoms  Radicular symptoms: Down the right and left arm and into both LE as well, patient indicates pain ache like something is digging in the bone going all the way down   Onset:: insidious/sudden/gradual   Pain Scale: Cortes rates pain on a scale of 0-10 to be 8 at worst; 5 currently; 3 at best .  Aggravating factors: Unknown cause of increase in symptoms, walking [place] for about 1 hour patient indicates that little bit of walking caused him to be in bed for a day, patient indicates that if he works in the yard a little bit he will get some discomfort a little bit   Dizziness/HA/blurred vision/B&B/ringing in ears: ringing present in both ears, patient has been trying to get into his ENT, some on the left but most discomforts come from the right, headaches present, blurred vision does have history of diabetes with two ketoacidotic episodes and some bleeding behind the retina, using eye drops about 3 times a day, dizziness not often but does sometimes happen, patient indicates he could get lightheaded   Relieving factors: Unknown cause of reduction in symptoms   Previous treatment/Past surgical history: 2003 to learn to walk again, 2015 following the stroke, back fusion in 2003  Imaging: Present in chart for orders of imaging      Functional deficits: Lifting, grabbing holding objects, patient is right handed he will drop something, driving he will notice drop attacks, patient indicates he gets tired  very easily, he feels he will have to stop and sit for a couple of moments, but he can not sit for very long either   Prior level of function: Patient indicates he has been having problems since his back surgery 2003, most evident and disruptive since August 2017, patient indicates he had a great deal of family problems that he was not able to focus on himself   Occupation: Unemployed, work duties include: Switch administer at [employer]    Environment: 1 story home with 1 steps to enter, has cane AD, patient does use it on occasion patient indicates due to his strength concerns he is not able to have confidence using this device   No cultural or spiritual barriers identified to treatment or learning.  Patient's goals: Ease the pain, get the right arm more functional. Patient indicates that his primary focus has been in his arm and neck       Objective     Observation: patient presents to clinic today with rounded shoulders and increase in thoracic     Posture: Increased rounded     Cervical Range of Motion:    Degrees Pain   Flexion 25      Extension 20 Present     Right Side Bending 30      Left Side Bending 20      Right Rotation 40 Present   Left Rotation 45       Shoulder Range of Motion:   Shoulder Left Right   Flexion 150 90/130   Abduction 80/100 60/70     Strength:  Cervical MMT   Flexion 4/5   Extension 4/5   Right Side Bend 4/5   Left Side Bend 4/5      Right UE Left UE   Biceps (C5-C6) 1+ 2+   Triceps (C7-C8) 2+ 1+       Upper Extremity Strength   (R) UE  (L) UE   Shoulder extension: 4+/5 Shoulder extension: 4/5   Shoulder flexion: 4+/5 Shoulder flexion: 4/5   Shoulder Abduction: 4+/5 Shoulder abduction: 4+/5   Shoulder ER 4/5 Shoulder ER 4/5   Shoulder IR 4+/5 Shoulder IR 4+/5   Elbow flexion: 5/5 Elbow flexion: 5/5   Elbow extension: 5/5 Elbow extension: 5/5   Wrist flexion: 5/5 Wrist flexion: 5/5   Wrist extension: 4+/5 Wrist extension: 4+/5    NT : NT   Middle Trap 4/5 Middle Trap 4/5   Rhomboids  4/5 Rhomboids 4/5       Special Tests:  Distraction NT   Compression Negative   Spurlings Negative   Sharp-Lauren Negative   VA test NT   Lateral Flexion Alar Ligament Negative    DNF test Positive   Transverse ligament stress test Negative     Upper Limb Neurodynamic testing:   Right Left   UNT Negative Negative   MNT Negative Negative   RNT Positive Positive      Thoracic mobility: Patient presents with limitations in thoracic mobility and increase in kyphosis     Palpation: Presents with sensitivity in suboccipitals and     Sensation: Gross light touch sensation intact     Flexibility: Decreased pec flexibility     Functional Limitations Reports - G Codes  Category: Changing & maintaining body position functional limitation  Intake 47% Current Status CK -    Predicted 40% Goal Status+ CK -     PT Evaluation Completed? Yes  Discussed Plan of Care with patient: Yes    TREATMENT:  Cortes received therapeutic exercises to develop strength and endurance, flexibility for 15 minutes including:  Chin tucks 20 reps 5 sec holds  Scapular retractions 20 reps  Upper trap stretch 3 times 20 seconds     Cortes  received the following manual therapy techniques x 5 min. To include Joint mobilizations and Soft tissue Mobilization were applied to the: Suboccipital region To include: Suboccipital release      HEP provided: Patient was given the above exercises to complete as part of HEP once a day to improve mobility and endurance of postural muscles   Instructed patient regarding: Proper technique with all exercises. Patient demonstrated good understanding of the education provided. Cortes demonstrated good return demonstration of activities.      Assessment     This is a 58 y.o. male referred to outpatient physical therapy and presents with a medical diagnosis of Cervicalgia and Lumbosacral radiculopathy and demonstrates limitations as described in the problem list. Patient presents to clinic with both ROSALBA and SARBJIT  indication of neurological symptoms. Patient indicates that UE is most bothersome and causing the most discomfort. Patient will benefit from exercises that promote improvements in endurance and strength to reduce forward head and rounded shoulders. Patient seems to show signs of light sensitivity and did not respond well to suboccipital release. Patient indicates that he had lumbar fusion back in 2003 and may be suffering from the effects of this procedure. Patient will engage in activities for the lumbar region following the treatment of his most disruptive impairment in his cervical and UE  Region.  Patient has a insulin pump on UE that he indicates he switches from his right to left arm. Patient will benefit from physcial therapy services in order to maximize pain free functional independence. The following goals were discussed with the patient and patient is in agreement with them as to be addressed in the treatment plan. Patient was given a HEP consisting of exercises listed above. Patient verbally understood the instructions as they were given and demonstrated proper form and technique during therapy. Patient was advised to perform these exercises free of pain, and to stop performing them if pain occurs. Patient would benefit from skilled PT to address above stated problems, as well as, achieve pt goals within a timely manner. Patient has set realistic goals and has verbalized good understanding and agreement with reported diagnosis, prognosis and treatment. Patient demonstrates no additional cultural, spiritual or educational need and currently has no barriers to learning.      History  Co-morbidities and personal factors that may impact the plan of care Examination  Body Structures and Functions, activity limitations and participation restrictions that may impact the plan of care Clinical Presentation   Decision Making/ Complexity Score   Co-morbidities:   Anxiety   Back pain   Cataract   Chronic pain  syndrome   Diabetes type 2, controlled   Diabetic retinopathy   DM (diabetes mellitus)   DM (diabetes mellitus), type 2, uncontrolled   Essential hypertension   Gastroesophageal reflux disease without esophagitis   Hyperlipidemia   Insomnia Neuropathy   Personal Factors:   Age 58  Occupation: Unemployed   Lifestyle: Sedentary    Attitudes: Pleasant    Body Regions: Cervical, Thoracic, Lumbar, Trunk, LE and UE     Body Systems: Musculoskeletal (symmetry, ROM, strength, flexibility, joint mobility), Neuromuscular (coordination, posture, balance, gait, transfers, motor control/learning), Cardiovascular (endurance)    Activity limitations: Patient indicates the can not do much that requires lifting because of his pain and weakness in his right arm    Participation Restrictions: Patient indicates problems with his driving task    Changing clinical presentation with changing clinical characteristics    Pain: 5/10 resting pain    Complexity:  Moderate     Functional Outcome measure  FOTO limitation: 47% disability         Prognosis: Good     Anticipated barriers to physical therapy: none    Medical necessity is demonstrated by the following IMPAIRMENTS/PROBLEM LIST:   1) Increase in pain level limiting function   2) Decreased cervical strength     3) Decreased cervical ROM    4) Muscle weakness    5) Poor posture    6) Lack of HEP    GOALS: Short Term Goals:  6 weeks  1. Patient will report a decrease in cervical pain  <   / =  3/10 to increase tolerance for daily activities..   2. Patient will be able to increase 20 degrees of shoulder ROM on right to improve tolerance to daily activities.   3. Patient will be able to demonstrate an increase of 5  degrees of pain free motion in cervical region to improve mobility.   4. Patient will be able to tolerate HEP to improve ROM and independence with ADL's.  5. Patient will be able to demonstrate proper resting and activity posture to improve shoulder mechanics with activity.      Long Term Goals: 12 weeks  1. Patient will report a decrease in shoulder pain  <   / =  2/10 at worse to increase tolerance for walking and ADL activities.  2. Patient will be able to increase MMT to 4+/5 in right shoulder ER to increase tolerance for work and golfing activities.   3. Patient will be able to increase MMT to 5/5 in cervical region to increase tolerance for work and golfing activities.  4. Patient will be able to demonstrate an increase of 30 degrees of pain free motion in cervical region to improve mobility and ADLs    5. Patient will be Independent with HEP to improve ROM and independence with ADL's        Plan     Patient will be treated by physical therapy 1-3 times a week for 12 weeks for Electrical Stimulation PRN, Iontophoresis (with dexamethasone PRN), Manual Therapy, Moist Heat/ Ice, Neuromuscular Re-ed, Patient Education, Therapeutic Activites, Therapeutic Exercise and Other therapeutic taping, dry needling, aquatic therapy to achieve established goals. Cortes may at times be seen by a PTA as part of the Rehab Team.      Cont PT for 12 weeks.      I certify the need for these services furnished under this plan of treatment and while under my care.______________________________ Physician/Referring Practitioner  Date of Signature      Alyson Kearns, PT  5/10/2018

## 2018-05-10 NOTE — PROGRESS NOTES
See full Physical Therapy Evaluation in POC     Precautions: Diabetic, Impaired hearing     Evaluation Date: 5/10/2018   Visit # authorized: 1  Authorization period: 5/30/2018    TREATMENT:  Cortes received therapeutic exercises to develop strength and endurance, flexibility for 15 minutes including:  Chin tucks 20 reps 5 sec holds  Scapular retractions 20 reps  Upper trap stretch 3 times 20 seconds     Cortes  received the following manual therapy techniques x 5 min. To include Joint mobilizations and Soft tissue Mobilization were applied to the: Suboccipital region To include: Suboccipital release     Prognosis: Good     Anticipated barriers to physical therapy: none    Medical necessity is demonstrated by the following IMPAIRMENTS/PROBLEM LIST:   1) Increase in pain level limiting function   2) Decreased cervical strength     3) Decreased cervical ROM    4) Muscle weakness    5) Poor posture    6) Lack of HEP    GOALS: Short Term Goals:  6 weeks  1. Patient will report a decrease in cervical pain  <   / =  3/10 to increase tolerance for daily activities..   2. Patient will be able to increase 20 degrees of shoulder ROM on right to improve tolerance to daily activities.   3. Patient will be able to demonstrate an increase of 5  degrees of pain free motion in cervical region to improve mobility.   4. Patient will be able to tolerate HEP to improve ROM and independence with ADL's.  5. Patient will be able to demonstrate proper resting and activity posture to improve shoulder mechanics with activity.     Long Term Goals: 12 weeks  1. Patient will report a decrease in shoulder pain  <   / =  2/10 at worse to increase tolerance for walking and ADL activities.  2. Patient will be able to increase MMT to 4+/5 in right shoulder ER to increase tolerance for work and golfing activities.   3. Patient will be able to increase MMT to 5/5 in cervical region to increase tolerance for work and golfing activities.  4. Patient  will be able to demonstrate an increase of 30 degrees of pain free motion in cervical region to improve mobility and ADLs    5. Patient will be Independent with HEP to improve ROM and independence with ADL's

## 2018-05-11 LAB
ESTIMATED AVG GLUCOSE: 214 MG/DL
HBA1C MFR BLD HPLC: 9.1 %
HIV 1+2 AB+HIV1 P24 AG SERPL QL IA: NEGATIVE

## 2018-05-21 ENCOUNTER — OFFICE VISIT (OUTPATIENT)
Dept: FAMILY MEDICINE | Facility: CLINIC | Age: 58
End: 2018-05-21
Payer: MEDICAID

## 2018-05-21 ENCOUNTER — LAB VISIT (OUTPATIENT)
Dept: LAB | Facility: HOSPITAL | Age: 58
End: 2018-05-21
Attending: FAMILY MEDICINE
Payer: MEDICAID

## 2018-05-21 ENCOUNTER — CLINICAL SUPPORT (OUTPATIENT)
Dept: REHABILITATION | Facility: HOSPITAL | Age: 58
End: 2018-05-21
Attending: NEUROLOGICAL SURGERY
Payer: MEDICAID

## 2018-05-21 VITALS
WEIGHT: 188.69 LBS | TEMPERATURE: 98 F | BODY MASS INDEX: 31.44 KG/M2 | RESPIRATION RATE: 12 BRPM | SYSTOLIC BLOOD PRESSURE: 114 MMHG | DIASTOLIC BLOOD PRESSURE: 76 MMHG | OXYGEN SATURATION: 98 % | HEIGHT: 65 IN | HEART RATE: 74 BPM

## 2018-05-21 DIAGNOSIS — Z20.6 EXPOSURE TO HIV: Primary | ICD-10-CM

## 2018-05-21 DIAGNOSIS — M25.619 DECREASED RANGE OF MOTION OF SHOULDER, UNSPECIFIED LATERALITY: ICD-10-CM

## 2018-05-21 DIAGNOSIS — Z20.6 EXPOSURE TO HIV: ICD-10-CM

## 2018-05-21 DIAGNOSIS — M54.2 NECK PAIN: ICD-10-CM

## 2018-05-21 DIAGNOSIS — Z78.9 IMPAIRED MOTOR CONTROL: ICD-10-CM

## 2018-05-21 DIAGNOSIS — M62.81 MUSCLE WEAKNESS: ICD-10-CM

## 2018-05-21 DIAGNOSIS — N40.1 BPH WITH OBSTRUCTION/LOWER URINARY TRACT SYMPTOMS: ICD-10-CM

## 2018-05-21 DIAGNOSIS — N13.8 BPH WITH OBSTRUCTION/LOWER URINARY TRACT SYMPTOMS: ICD-10-CM

## 2018-05-21 LAB
ALBUMIN SERPL BCP-MCNC: 3.9 G/DL
ALP SERPL-CCNC: 91 U/L
ALT SERPL W/O P-5'-P-CCNC: 49 U/L
ANION GAP SERPL CALC-SCNC: 8 MMOL/L
AST SERPL-CCNC: 27 U/L
BASOPHILS # BLD AUTO: 0.04 K/UL
BASOPHILS NFR BLD: 0.4 %
BILIRUB SERPL-MCNC: 0.5 MG/DL
BUN SERPL-MCNC: 17 MG/DL
CALCIUM SERPL-MCNC: 9.7 MG/DL
CHLORIDE SERPL-SCNC: 104 MMOL/L
CO2 SERPL-SCNC: 24 MMOL/L
CREAT SERPL-MCNC: 1.1 MG/DL
DIFFERENTIAL METHOD: ABNORMAL
EOSINOPHIL # BLD AUTO: 0.1 K/UL
EOSINOPHIL NFR BLD: 1.3 %
ERYTHROCYTE [DISTWIDTH] IN BLOOD BY AUTOMATED COUNT: 13.8 %
EST. GFR  (AFRICAN AMERICAN): >60 ML/MIN/1.73 M^2
EST. GFR  (NON AFRICAN AMERICAN): >60 ML/MIN/1.73 M^2
GLUCOSE SERPL-MCNC: 152 MG/DL
HCT VFR BLD AUTO: 43.8 %
HGB BLD-MCNC: 14.6 G/DL
LYMPHOCYTES # BLD AUTO: 2.6 K/UL
LYMPHOCYTES NFR BLD: 23.3 %
MCH RBC QN AUTO: 27.5 PG
MCHC RBC AUTO-ENTMCNC: 33.3 G/DL
MCV RBC AUTO: 83 FL
MONOCYTES # BLD AUTO: 0.8 K/UL
MONOCYTES NFR BLD: 6.8 %
NEUTROPHILS # BLD AUTO: 7.5 K/UL
NEUTROPHILS NFR BLD: 67.8 %
PLATELET # BLD AUTO: 358 K/UL
PMV BLD AUTO: 10.9 FL
POTASSIUM SERPL-SCNC: 4.5 MMOL/L
PROT SERPL-MCNC: 7.2 G/DL
RBC # BLD AUTO: 5.31 M/UL
SODIUM SERPL-SCNC: 136 MMOL/L
WBC # BLD AUTO: 11.1 K/UL

## 2018-05-21 PROCEDURE — 99213 OFFICE O/P EST LOW 20 MIN: CPT | Mod: PBBFAC,PN | Performed by: FAMILY MEDICINE

## 2018-05-21 PROCEDURE — 85025 COMPLETE CBC W/AUTO DIFF WBC: CPT

## 2018-05-21 PROCEDURE — 80053 COMPREHEN METABOLIC PANEL: CPT

## 2018-05-21 PROCEDURE — 86706 HEP B SURFACE ANTIBODY: CPT

## 2018-05-21 PROCEDURE — 80074 ACUTE HEPATITIS PANEL: CPT

## 2018-05-21 PROCEDURE — 36415 COLL VENOUS BLD VENIPUNCTURE: CPT | Mod: PN

## 2018-05-21 PROCEDURE — 99999 PR PBB SHADOW E&M-EST. PATIENT-LVL III: CPT | Mod: PBBFAC,,, | Performed by: FAMILY MEDICINE

## 2018-05-21 PROCEDURE — 84153 ASSAY OF PSA TOTAL: CPT

## 2018-05-21 PROCEDURE — 97110 THERAPEUTIC EXERCISES: CPT | Mod: PN

## 2018-05-21 PROCEDURE — 99214 OFFICE O/P EST MOD 30 MIN: CPT | Mod: S$PBB,,, | Performed by: FAMILY MEDICINE

## 2018-05-21 NOTE — PROGRESS NOTES
Routine Office Visit    Patient Name: Cortes Schroeder    : 1960  MRN: [id number]    Subjective:  Cortes is a 58 y.o. male who presents today for:    1. PrEP  Patient presenting today to discuss starting PrEP therapy.  Patient was in a monogamous relationship with his late  for 15 years.  His  passed away 1 year ago and Mr. Schroeder is now starting into a new relationship.  Patient admits to having unprotected sex with his new partner, but states that they are not having sex with other people.  He states that he just wants to be safe as he will be using condoms from now on.  Patient does have HTN, type 2 DM (poorly controlled), and hyperlipemia.  No history of chronic kidney disease.  Patient has read up on Truvada and states that he does know that this is not a substitute for condom use.      Past Medical History  Past Medical History:   Diagnosis Date    Anxiety 2012    Back pain 2012    Cataract     Chronic pain syndrome 2016    Diabetes type 2, controlled 2016    Diabetic retinopathy     DM (diabetes mellitus) 2012    DM (diabetes mellitus), type 2, uncontrolled 2013    Essential hypertension 2016    Gastroesophageal reflux disease without esophagitis 2016    Hyperlipidemia 2012    Insomnia 2014    Neuropathy 2013       Past Surgical History  Past Surgical History:   Procedure Laterality Date    BACK SURGERY      Lumbar Spine       Family History  Family History   Problem Relation Age of Onset    Cataracts Father     Hypertension Father     Parkinsonism Father     Alzheimer's disease Father     Migraines Mother     Hypertension Mother     Heart attack Mother     Amblyopia Neg Hx     Blindness Neg Hx     Glaucoma Neg Hx     Macular degeneration Neg Hx     Retinal detachment Neg Hx     Strabismus Neg Hx        Social History  Social History     Social History    Marital status:      Spouse name: N/A     Number of children: N/A    Years of education: N/A     Occupational History    Not on file.     Social History Main Topics    Smoking status: Never Smoker    Smokeless tobacco: Never Used    Alcohol use 0.6 oz/week     1 Glasses of wine per week      Comment: occasionally    Drug use: No    Sexual activity: Not Currently     Partners: Male     Birth control/ protection: Condom      Comment: 10/2/17      Other Topics Concern    Not on file     Social History Narrative    No narrative on file       Current Medications  Current Outpatient Prescriptions on File Prior to Visit   Medication Sig Dispense Refill    blood sugar diagnostic (FREESTYLE INSULINX TEST STRIPS) Strp 1 strip by Misc.(Non-Drug; Combo Route) route 4 (four) times daily before meals and nightly. 200 strip 8    clotrimazole-betamethasone 1-0.05% (LOTRISONE) cream Apply topically 2 (two) times daily. 15 g 5    cyanocobalamin (VITAMIN B-12) 100 MCG tablet Take 100 mcg by mouth once daily.      DULoxetine (CYMBALTA) 60 MG capsule Take 1 capsule (60 mg total) by mouth once daily. 30 capsule 3    fish oil-omega-3 fatty acids 300-1,000 mg capsule Take by mouth 2 (two) times daily.      gabapentin (NEURONTIN) 100 MG capsule TAKE 1 TO 2 CAPSULES(100  MG) BY MOUTH EVERY EVENING 60 capsule 3    losartan (COZAAR) 50 MG tablet Take 1 tablet (50 mg total) by mouth once daily. 90 tablet 3    magnesium oxide-Mg AA chelate (MG-PLUS-PROTEIN) 133 mg Tab Take 500 mg by mouth every evening.       metFORMIN (GLUCOPHAGE) 1000 MG tablet TAKE 1 TABLET(1000 MG) BY MOUTH TWICE DAILY WITH MEALS 180 tablet 0    pravastatin (PRAVACHOL) 80 MG tablet Take 1 tablet (80 mg total) by mouth once daily. 90 tablet 3    PROPYLENE GLYCOL//PF (SYSTANE, PF, OPHT) Apply to eye 4 (four) times daily.      SITagliptin (JANUVIA) 100 MG Tab Take 1 tablet (100 mg total) by mouth once daily. 90 tablet 3    syringe, disposable, 1 mL Syrg Use as directed 100  "Syringe 11    TRUE METRIX GLUCOSE TEST STRIP Strp USE TID  0    vitamin D 1000 units Tab Take 185 mg by mouth 2 (two) times daily.       aspirin (ECOTRIN) 81 MG EC tablet Take 1 tablet (81 mg total) by mouth once daily.  0    fenofibrate 160 MG Tab Take 1 tablet (160 mg total) by mouth once daily. 90 tablet 3    insulin lispro (HUMALOG) 100 unit/mL injection Inject into the skin 3 (three) times daily before meals.      PROMETHAZINE/DEXTROMETHORPHAN (PROMETHAZINE-DM ORAL) Take by mouth as needed.       No current facility-administered medications on file prior to visit.        Allergies   Review of patient's allergies indicates:   Allergen Reactions    Invokana [canagliflozin] Anaphylaxis    Percocet [oxycodone-acetaminophen] Nausea Only and Hallucinations    Biaxin [clarithromycin]     Hydrocodone Other (See Comments)     Dizzy/nausea/hallucinations    Sulfa (sulfonamide antibiotics) Nausea Only and Rash       Review of Systems (Pertinent positives)  Review of Systems   HENT: Positive for hearing loss.    Eyes: Negative for discharge.   Respiratory: Negative for wheezing.    Cardiovascular: Negative for chest pain and palpitations.   Gastrointestinal: Negative for blood in stool, constipation, diarrhea and vomiting.   Genitourinary: Negative for hematuria and urgency.        +ED   Musculoskeletal: Positive for neck pain.   Neurological: Positive for headaches. Negative for weakness.   Endo/Heme/Allergies: Negative for polydipsia.         /76 (BP Location: Left arm, Patient Position: Sitting, BP Method: Medium (Manual))   Pulse 74   Temp 98 °F (36.7 °C) (Oral)   Resp 12   Ht 5' 5" (1.651 m)   Wt 85.6 kg (188 lb 11.4 oz)   SpO2 98%   BMI 31.40 kg/m²     GENERAL APPEARANCE: in no apparent distress and well developed and well nourished  HEENT: PERRL, EOMI, Sclera clear, anicteric, Oropharynx clear, no lesions, Neck supple with midline trachea  NECK: normal, supple, no adenopathy, thyroid normal " in size  RESPIRATORY: appears well, vitals normal, no respiratory distress, acyanotic, normal RR, chest clear, no wheezing, crepitations, rhonchi, normal symmetric air entry  HEART: regular rate and rhythm, S1, S2 normal, no murmur, click, rub or gallop.    ABDOMEN: abdomen is soft without tenderness, no masses, no hernias, no organomegaly, no rebound, no guarding. Suprapubic tenderness absent. No CVA tenderness.  SKIN: no rashes, no wounds, no other lesions  PSYCH: Alert, oriented x 3, thought content appropriate, speech normal, pleasant and cooperative, good eye contact, well groomed    Assessment/Plan:  Cortes Schroeder is a 58 y.o. male who presents today for :    Cortes was seen today for discuss medication.    Diagnoses and all orders for this visit:    Exposure to HIV  -     Comprehensive metabolic panel; Future  -     CBC auto differential; Future  -     Hepatitis panel, acute; Future  -     Hepatitis B surface antibody; Future      1.  Labs to be done today  2.  Pending results will start Truvada  3.  HIV test done 2 weeks ago negative and he has not had sex since then  4.  Follow up 1 month or sooner if needed    Don Artis MD

## 2018-05-21 NOTE — PROGRESS NOTES
Physical Therapy Daily Note   Name: Cortes Schroeder  Clinic Number: [id number]    Diagnosis:   Encounter Diagnoses   Name Primary?    Neck pain     Muscle weakness     Impaired motor control     Decreased range of motion of shoulder, unspecified laterality      Physician: Hira Acosta, *  Treatment Orders: PT Eval and Treat    Precautions: Diabetic, Impaired hearing   Visit #: 1 of 1  Total visit #: 2  Eval date: 5/10/2018  G-code reported: Initial Evaluation (Visit #1)   Certification period: (5/10/2018 -8/2/2018) PN Due (6/10/2018)    Subjective   Cortes reports: that he had a bad weekend with pain. Patient indicates that he did not do much he was just in pain most of the weekend from his leg to his head.     Pain Scale:  5/10      Objective     Time In: 0930  Time Out: 1020  Total Treatment time: 50 minutes (1:1 with PT for 30' of treatment session)     Patient performed the following therapeutic exercises for 50 minutes in order to strengthen and stretch cervical region :  +Pulleys 6 minutes (flexion and abduction)   Chin tucks 20 reps 5 sec holds  Scapular retractions 20 reps  +Upper trap stretch 3 times 20 seconds   +Scalenes stretch 3 times 20 seconds   +Cervical isometrics (lateral and retraction) 20 reps 3 sec holds c yellow TB   Supine chin tucks 20 reps 5 sec holds     Written Home Exercises Provided: Patient educated to continue with previously issued HEP in order to complement therapy sessions.   Exercises were reviewed and Cortes was able to demonstrate them prior to the end of the session. Patient received a written copy of exercises to perform at home. Cortes demonstrated good  understanding of the education provided.     Assessment     Cortes is progressing well towards his goals. Patient demonstrates accuracy in her HEP exercises demonstrated and performed in clinic today. Patient demonstrated some relief in his tension pain on the  right side following manual therapy with trigger points found in upper trap muscles particularly on the right side. Patient will benefit from manual therapy performed at the start of therapy session. Patient will continue to benefit from skilled PT services in order to address limitations present in problem list and education on proper advancement of HEP.     Pt's spiritual, cultural and educational needs considered and pt agreeable to plan of care and goals.    Prognosis: Good     Anticipated barriers to physical therapy: none    Medical necessity is demonstrated by the following IMPAIRMENTS/PROBLEM LIST:   1) Increase in pain level limiting function   2) Decreased cervical strength     3) Decreased cervical ROM    4) Muscle weakness    5) Poor posture    6) Lack of HEP    GOALS: Short Term Goals:  6 weeks  1. Patient will report a decrease in cervical pain  <   / =  3/10 to increase tolerance for daily activities..   2. Patient will be able to increase 20 degrees of shoulder ROM on right to improve tolerance to daily activities.   3. Patient will be able to demonstrate an increase of 5  degrees of pain free motion in cervical region to improve mobility.   4. Patient will be able to tolerate HEP to improve ROM and independence with ADL's.  5. Patient will be able to demonstrate proper resting and activity posture to improve shoulder mechanics with activity.     Long Term Goals: 12 weeks  1. Patient will report a decrease in shoulder pain  <   / =  2/10 at worse to increase tolerance for walking and ADL activities.  2. Patient will be able to increase MMT to 4+/5 in right shoulder ER to increase tolerance for work and golfing activities.   3. Patient will be able to increase MMT to 5/5 in cervical region to increase tolerance for work and golfing activities.  4. Patient will be able to demonstrate an increase of 30 degrees of pain free motion in cervical region to improve mobility and ADLs    5. Patient will be  Independent with HEP to improve ROM and independence with ADL's      Plan   Continue with established Plan of Care towards PT goals.       Alyson Kearns, PT  5/21/2018

## 2018-05-22 LAB
COMPLEXED PSA SERPL-MCNC: 0.38 NG/ML
HAV IGM SERPL QL IA: NEGATIVE
HBV CORE IGM SERPL QL IA: NEGATIVE
HBV SURFACE AB SER-ACNC: POSITIVE M[IU]/ML
HBV SURFACE AG SERPL QL IA: NEGATIVE
HCV AB SERPL QL IA: NEGATIVE

## 2018-05-23 ENCOUNTER — TELEPHONE (OUTPATIENT)
Dept: FAMILY MEDICINE | Facility: CLINIC | Age: 58
End: 2018-05-23

## 2018-05-24 ENCOUNTER — CLINICAL SUPPORT (OUTPATIENT)
Dept: REHABILITATION | Facility: HOSPITAL | Age: 58
End: 2018-05-24
Attending: NEUROLOGICAL SURGERY
Payer: MEDICAID

## 2018-05-24 DIAGNOSIS — M54.2 NECK PAIN: ICD-10-CM

## 2018-05-24 DIAGNOSIS — M25.619 DECREASED RANGE OF MOTION OF SHOULDER, UNSPECIFIED LATERALITY: ICD-10-CM

## 2018-05-24 DIAGNOSIS — M62.81 MUSCLE WEAKNESS: ICD-10-CM

## 2018-05-24 DIAGNOSIS — Z78.9 IMPAIRED MOTOR CONTROL: ICD-10-CM

## 2018-05-24 PROCEDURE — 97110 THERAPEUTIC EXERCISES: CPT | Mod: PN

## 2018-05-24 NOTE — PROGRESS NOTES
Physical Therapy Daily Note   Name: Cortes Schroeder  Clinic Number: [id number]    Diagnosis:   Encounter Diagnoses   Name Primary?    Neck pain     Muscle weakness     Impaired motor control     Decreased range of motion of shoulder, unspecified laterality      Physician: Hira Acosta, *  Treatment Orders: PT Eval and Treat    Precautions: Diabetic, Impaired hearing   Visit #: 1 of 36  Total visit #: 3  Eval date: 5/10/2018  G-code reported: Initial Evaluation (Visit #1)   Certification period: (5/10/2018 -8/2/2018) PN Due (6/10/2018)    Subjective   Cortes reports: that he is feeling better than when he came on Monday. Patient indicates that he still has a headache that is throbbing at the start of therapy session.     Pain Scale:  3/10      Objective     Time In: 1300  Time Out: 1350  Total Treatment time: 50 minutes (1:1 with PT for 30' of treatment session)     Patient performed the following therapeutic exercises for 50 minutes in order to strengthen and stretch cervical region :  Pulleys 6 minutes (flexion and abduction)   Chin tucks 20 reps 5 sec holds  Scapular retractions 20 reps  Upper trap stretch 3 times 20 seconds   Scalenes stretch 3 times 20 seconds   Cervical isometrics (lateral and retraction) 20 reps 3 sec holds c yellow TB   Supine chin tucks 20 reps 5 sec holds   +Standing rows 2 sets of 10 reps c red TB  +Standing shoulder ext 2 sets of 10 reps c red TB   +Supine pec stretch on foam roll 3 minutes     Manual therapy performed on cervical region for 8 minutes to reduce presence of trigger point present in right upper trap muscle to reduce tension headaches     Written Home Exercises Provided: Patient educated to continue with previously issued HEP in order to complement therapy sessions.   Exercises were reviewed and Cortes was able to demonstrate them prior to the end of the session. Patient received a written copy of exercises to  perform at home. Cortes demonstrated good  understanding of the education provided.     Assessment     Cortes is progressing well towards his goals. Patient did well with manual therapy performed at start of therapy session today. Patient was able to engage in more exercises during today's treatment session and did well with supine pec stretch on half roll. Patient demonstrates trigger point in right upper trap muscle work through with manual techniques. Patient was able to have a decrease in intensity in symptoms with his headache that changed from throbbing to just nagging pain present. Patient will continue to benefit from skilled PT services in order to address limitations present in problem list and education on proper advancement of HEP.     Pt's spiritual, cultural and educational needs considered and pt agreeable to plan of care and goals.    Prognosis: Good     Anticipated barriers to physical therapy: none    Medical necessity is demonstrated by the following IMPAIRMENTS/PROBLEM LIST:   1) Increase in pain level limiting function   2) Decreased cervical strength     3) Decreased cervical ROM    4) Muscle weakness    5) Poor posture    6) Lack of HEP    GOALS: Short Term Goals:  6 weeks  1. Patient will report a decrease in cervical pain  <   / =  3/10 to increase tolerance for daily activities..   2. Patient will be able to increase 20 degrees of shoulder ROM on right to improve tolerance to daily activities.   3. Patient will be able to demonstrate an increase of 5  degrees of pain free motion in cervical region to improve mobility.   4. Patient will be able to tolerate HEP to improve ROM and independence with ADL's.  5. Patient will be able to demonstrate proper resting and activity posture to improve shoulder mechanics with activity.     Long Term Goals: 12 weeks  1. Patient will report a decrease in shoulder pain  <   / =  2/10 at worse to increase tolerance for walking and ADL activities.  2.  Patient will be able to increase MMT to 4+/5 in right shoulder ER to increase tolerance for work and golfing activities.   3. Patient will be able to increase MMT to 5/5 in cervical region to increase tolerance for work and golfing activities.  4. Patient will be able to demonstrate an increase of 30 degrees of pain free motion in cervical region to improve mobility and ADLs    5. Patient will be Independent with HEP to improve ROM and independence with ADL's      Plan   Continue with established Plan of Care towards PT goals.       Alyson TOLLIVER [facility], PT  5/24/2018

## 2018-05-26 DIAGNOSIS — E11.42 DIABETIC PERIPHERAL NEUROPATHY ASSOCIATED WITH TYPE 2 DIABETES MELLITUS: ICD-10-CM

## 2018-05-28 ENCOUNTER — CLINICAL SUPPORT (OUTPATIENT)
Dept: REHABILITATION | Facility: HOSPITAL | Age: 58
End: 2018-05-28
Attending: NEUROLOGICAL SURGERY
Payer: MEDICAID

## 2018-05-28 DIAGNOSIS — M54.2 NECK PAIN: ICD-10-CM

## 2018-05-28 DIAGNOSIS — Z78.9 IMPAIRED MOTOR CONTROL: ICD-10-CM

## 2018-05-28 DIAGNOSIS — M25.619 DECREASED RANGE OF MOTION OF SHOULDER, UNSPECIFIED LATERALITY: ICD-10-CM

## 2018-05-28 DIAGNOSIS — M62.81 MUSCLE WEAKNESS: ICD-10-CM

## 2018-05-28 PROCEDURE — 97110 THERAPEUTIC EXERCISES: CPT | Mod: PN

## 2018-05-28 RX ORDER — GABAPENTIN 100 MG/1
CAPSULE ORAL
Qty: 60 CAPSULE | Refills: 11 | Status: SHIPPED | OUTPATIENT
Start: 2018-05-28 | End: 2019-05-31 | Stop reason: SDUPTHER

## 2018-05-28 NOTE — PROGRESS NOTES
Physical Therapy Daily Note   Name: Cortes Schroeder  Clinic Number: [id number]    Diagnosis:   Encounter Diagnoses   Name Primary?    Neck pain     Muscle weakness     Impaired motor control     Decreased range of motion of shoulder, unspecified laterality      Physician: Moe Dahl MD  Treatment Orders: PT Eval and Treat    Precautions: Diabetic, Impaired hearing   Visit #: 2 of 36  Total visit #: 4  Eval date: 5/10/2018  G-code reported: Initial Evaluation (Visit #1)   Certification period: (5/10/2018 -8/2/2018) PN Due (6/10/2018)    Subjective   Cortes reports: that he still does not have very good weekend. Patient indicates he continues to have problems sleeping. Patient indicates he sleeps on his stomach and the only position that he can get comfortable is with his arm above his head.     Pain Scale:  3/10      Objective     Time In: 0800  Time Out: 0900  Total Treatment time: 60 minutes (1:1 with PT for 40' of treatment session)     Patient performed the following therapeutic exercises for 50 minutes in order to strengthen and stretch cervical region :  Pulleys 6 minutes (flexion and abduction)   Chin tucks 20 reps 5 sec holds  Scapular retractions 20 reps  Upper trap stretch 3 times 20 seconds   Scalenes stretch 3 times 20 seconds   Cervical isometrics (lateral and retraction) 20 reps 3 sec holds c yellow TB   Supine chin tucks 20 reps 5 sec holds   Standing rows 2 sets of 10 reps c red TB  Standing shoulder ext 2 sets of 10 reps c red TB   Supine pec stretch on foam roll 3 minutes     Manual therapy performed on cervical region for 8 minutes to reduce presence of trigger point present in right upper trap muscle to reduce tension headaches     Written Home Exercises Provided: Patient educated to continue with previously issued HEP in order to complement therapy sessions.   Exercises were reviewed and Cortes was able to demonstrate them prior to the  end of the session. Patient received a written copy of exercises to perform at home. Cortes demonstrated good  understanding of the education provided.     Assessment     Cortes is progressing well towards his goals. Patient still presents to clinic with headaches and neck pain. Patient still response well to manual trigger point release of the upper trap muscles. Patient still demonstrates poor posture and require frequent cueing to reduce the presence of this. Patient indicates discomfort down his leg that was explained with his poor posture causing rounded back causing possible neurological tension. Patient was encouraged to be aware of his posture and continue his exercises so that he will get stronger to support his new posture. Patient will continue to benefit from skilled PT services in order to address limitations present in problem list and education on proper advancement of HEP.     Pt's spiritual, cultural and educational needs considered and pt agreeable to plan of care and goals.    Prognosis: Good     Anticipated barriers to physical therapy: none    Medical necessity is demonstrated by the following IMPAIRMENTS/PROBLEM LIST:   1) Increase in pain level limiting function   2) Decreased cervical strength     3) Decreased cervical ROM    4) Muscle weakness    5) Poor posture    6) Lack of HEP    GOALS: Short Term Goals:  6 weeks  1. Patient will report a decrease in cervical pain  <   / =  3/10 to increase tolerance for daily activities..   2. Patient will be able to increase 20 degrees of shoulder ROM on right to improve tolerance to daily activities.   3. Patient will be able to demonstrate an increase of 5  degrees of pain free motion in cervical region to improve mobility.   4. Patient will be able to tolerate HEP to improve ROM and independence with ADL's.  5. Patient will be able to demonstrate proper resting and activity posture to improve shoulder mechanics with activity.     Long Term Goals:  12 weeks  1. Patient will report a decrease in shoulder pain  <   / =  2/10 at worse to increase tolerance for walking and ADL activities.  2. Patient will be able to increase MMT to 4+/5 in right shoulder ER to increase tolerance for work and golfing activities.   3. Patient will be able to increase MMT to 5/5 in cervical region to increase tolerance for work and golfing activities.  4. Patient will be able to demonstrate an increase of 30 degrees of pain free motion in cervical region to improve mobility and ADLs    5. Patient will be Independent with HEP to improve ROM and independence with ADL's      Plan   Continue with established Plan of Care towards PT goals.       Alyson TOLLIVER [facility], PT  5/28/2018

## 2018-06-01 RX ORDER — EMTRICITABINE AND TENOFOVIR DISOPROXIL FUMARATE 200; 300 MG/1; MG/1
1 TABLET, FILM COATED ORAL DAILY
Qty: 30 TABLET | Refills: 0 | Status: SHIPPED | OUTPATIENT
Start: 2018-06-01 | End: 2018-06-22 | Stop reason: SDUPTHER

## 2018-06-04 ENCOUNTER — CLINICAL SUPPORT (OUTPATIENT)
Dept: REHABILITATION | Facility: HOSPITAL | Age: 58
End: 2018-06-04
Attending: NEUROLOGICAL SURGERY
Payer: MEDICAID

## 2018-06-04 DIAGNOSIS — M25.619 DECREASED RANGE OF MOTION OF SHOULDER, UNSPECIFIED LATERALITY: ICD-10-CM

## 2018-06-04 DIAGNOSIS — M54.2 NECK PAIN: ICD-10-CM

## 2018-06-04 DIAGNOSIS — M62.81 MUSCLE WEAKNESS: ICD-10-CM

## 2018-06-04 DIAGNOSIS — Z78.9 IMPAIRED MOTOR CONTROL: ICD-10-CM

## 2018-06-04 PROCEDURE — 97110 THERAPEUTIC EXERCISES: CPT | Mod: PN

## 2018-06-04 NOTE — PROGRESS NOTES
Physical Therapy Daily Note   Name: Cortes Schroeder  Clinic Number: [id number]    Diagnosis:   Encounter Diagnoses   Name Primary?    Neck pain     Muscle weakness     Impaired motor control     Decreased range of motion of shoulder, unspecified laterality      Physician: Moe Dahl MD  Treatment Orders: PT Eval and Treat    Precautions: Diabetic, Impaired hearing   Visit #: 3 of 36  Total visit #: 5  Eval date: 5/10/2018  G-code reported: Initial Evaluation (Visit #1)   Certification period: (5/10/2018 -8/2/2018) PN Due (6/10/2018)    PATIENT NEEDS FOTO and reassessment NEXT VISIT! Assessment of lumbar region as well, present in original order and patient presents with greater compliant     Subjective   Cotres reports: that he just gets stiff on the weekends. Patient indicates he did not do much as yesterday he just went to [employer] and then laid in bed for most of the day. Patient indicates that he does not have a headache present today.     Pain Scale:  2/10      Objective     Time In: 0900  Time Out: 1000  Total Treatment time: 60 minutes (1:1 with PT for 30' of treatment session)     Patient performed the following therapeutic exercises for 50 minutes in order to strengthen and stretch cervical region :  Pulleys 6 minutes (flexion and abduction)   Chin tucks 20 reps 5 sec holds  Scapular retractions 30 reps  Upper trap stretch 3 times 20 seconds   Scalenes stretch 3 times 20 seconds   Cervical isometrics (lateral and retraction) 20 reps 3 sec holds c yellow TB   Supine chin tucks 20 reps 5 sec holds   Standing rows 2 sets of 10 reps c red TB  Standing shoulder ext 2 sets of 10 reps c red TB   Supine pec stretch on foam roll 3 minutes     Manual therapy performed on cervical region for 8 minutes to reduce presence of trigger point present in right upper trap muscle to reduce tension headaches     Moist heat pack was placed on cervical region for 10  minutes to reduce tension and stiffness in cervical region.     Written Home Exercises Provided: Patient educated to continue with previously issued HEP in order to complement therapy sessions.   Exercises were reviewed and Cortes was able to demonstrate them prior to the end of the session. Patient received a written copy of exercises to perform at home. Cortes demonstrated good  understanding of the education provided.     Assessment     Cortes is progressing well towards his goals. Patient responded well to manual therapy performed in clinic today. Patient has a trigger point in the origin of the SCM on the right that responded to manual therapy. Patient indicates feeling a bit off today and only engaged in mostly stretches during today's treatment session. Patient presented to clinic today with a reduction in pain symptoms since last treatment session. Patient had a fair tolerance to treatment today as with some of the stretches he developed a headache. Patient responded well to heat placed on cervical region following today's treatment. Patient continues to demonstrate poor posture in clinic today. Patient was encouraged to be aware of his posture and continue his exercises so that he will get stronger to support his new posture. Patient will continue to benefit from skilled PT services in order to address limitations present in problem list and education on proper advancement of HEP.     Pt's spiritual, cultural and educational needs considered and pt agreeable to plan of care and goals.    Prognosis: Good     Anticipated barriers to physical therapy: none    Medical necessity is demonstrated by the following IMPAIRMENTS/PROBLEM LIST:   1) Increase in pain level limiting function   2) Decreased cervical strength     3) Decreased cervical ROM    4) Muscle weakness    5) Poor posture    6) Lack of HEP    GOALS: Short Term Goals:  6 weeks  1. Patient will report a decrease in cervical pain  <   / =  3/10 to  increase tolerance for daily activities..   2. Patient will be able to increase 20 degrees of shoulder ROM on right to improve tolerance to daily activities.   3. Patient will be able to demonstrate an increase of 5  degrees of pain free motion in cervical region to improve mobility.   4. Patient will be able to tolerate HEP to improve ROM and independence with ADL's.  5. Patient will be able to demonstrate proper resting and activity posture to improve shoulder mechanics with activity.     Long Term Goals: 12 weeks  1. Patient will report a decrease in shoulder pain  <   / =  2/10 at worse to increase tolerance for walking and ADL activities.  2. Patient will be able to increase MMT to 4+/5 in right shoulder ER to increase tolerance for work and golfing activities.   3. Patient will be able to increase MMT to 5/5 in cervical region to increase tolerance for work and golfing activities.  4. Patient will be able to demonstrate an increase of 30 degrees of pain free motion in cervical region to improve mobility and ADLs    5. Patient will be Independent with HEP to improve ROM and independence with ADL's      Plan   Continue with established Plan of Care towards PT goals.       Alyson Kearns, PT  6/4/2018

## 2018-06-06 ENCOUNTER — TELEPHONE (OUTPATIENT)
Dept: OPTOMETRY | Facility: CLINIC | Age: 58
End: 2018-06-06

## 2018-06-06 ENCOUNTER — PATIENT MESSAGE (OUTPATIENT)
Dept: OPTOMETRY | Facility: CLINIC | Age: 58
End: 2018-06-06

## 2018-06-07 ENCOUNTER — CLINICAL SUPPORT (OUTPATIENT)
Dept: REHABILITATION | Facility: HOSPITAL | Age: 58
End: 2018-06-07
Attending: NEUROLOGICAL SURGERY
Payer: MEDICAID

## 2018-06-07 DIAGNOSIS — M54.2 NECK PAIN: ICD-10-CM

## 2018-06-07 DIAGNOSIS — M62.81 MUSCLE WEAKNESS: ICD-10-CM

## 2018-06-07 DIAGNOSIS — Z78.9 IMPAIRED MOTOR CONTROL: ICD-10-CM

## 2018-06-07 DIAGNOSIS — M25.619 DECREASED RANGE OF MOTION OF SHOULDER, UNSPECIFIED LATERALITY: ICD-10-CM

## 2018-06-07 PROCEDURE — 97110 THERAPEUTIC EXERCISES: CPT | Mod: PN

## 2018-06-07 NOTE — PROGRESS NOTES
Physical Therapy Daily Note   Name: Cortes Schroeder  Clinic Number: [id number]    Diagnosis:   Encounter Diagnoses   Name Primary?    Neck pain     Muscle weakness     Impaired motor control     Decreased range of motion of shoulder, unspecified laterality      Physician: Moe Dahl MD  Treatment Orders: PT Eval and Treat    Precautions: Diabetic, Impaired hearing   Visit #: 4 of 36  Total visit #: 6  Eval date: 5/10/2018  G-code reported: Initial Evaluation (Visit #1)   Certification period: 5/10/2018 -8/2/2018 (PN Due 6/10/2018)    PATIENT NEEDS FOTO and reassessment NEXT VISIT! Assessment of lumbar region as well, present in original order and patient presents with greater compliant     Subjective     Cortes reports: pain from the base of his skull, down into his low back, and down into his Right foot. Patient states that his back pain is worse than his neck.  Pain Scale:  5 out of 10, currently.    Objective     Time In: 0900  Time Out: 1010  Total Treatment time: 60 minutes (1:1 with PTA for 30 mins of treatment session)     Patient performed the following therapeutic exercises for 50 minutes in order to strengthen and stretch cervical region:  Pulleys: 6 minutes (flexion and abduction)   Chin tucks: 20 x 5 sec holds  Scapular retractions: 30x  Upper trap stretch: 3 x 20 seconds   Scalenes stretch: 3 x 20 seconds   Cervical isometrics (lateral and retraction): 20 x 3 sec holds c yellow TB   Supine chin tucks: 20 x 5 sec holds   Standing rows: 2 x 10 x Red TB  Standing shoulder ext: 2 x 10 x Red TB   Supine pec stretch on foam roll: 3 minutes   +DKTC on SB with UE flexion: 3 x 10 (hands clasped)    +Bed mobility (log roll) and postural correction x 5 minutes    Manual therapy performed on cervical region:  10 mins x suboccipital release, Steven upper trap stretch, cervical distraction    Moist heat pack was placed on cervical region for 10 minutes to  reduce tension and stiffness in cervical region.     Written Home Exercises Provided: Patient educated to continue with previously issued HEP in order to complement therapy sessions.   Exercises were reviewed and Cortes was able to demonstrate them prior to the end of the session. Patient received a written copy of exercises to perform at home. Cortes demonstrated good  understanding of the education provided.     Assessment     Patient tolerated treatment session well today reporting reduction in LE and UE radicular symptoms post treatment session. Patient with good response to manual care reporting relief with suboccipital release and cervical distraction. Heavy education given on bed mobility (log roll) and postural correction with verbal understanding by patient. Patient will continue to benefit from skilled PT services in order to address limitations present in problem list and education on proper advancement of HEP.     Pt's spiritual, cultural and educational needs considered and pt agreeable to plan of care and goals.    Prognosis: Good     Anticipated barriers to physical therapy: none    Medical necessity is demonstrated by the following IMPAIRMENTS/PROBLEM LIST:   1) Increase in pain level limiting function   2) Decreased cervical strength     3) Decreased cervical ROM    4) Muscle weakness    5) Poor posture    6) Lack of HEP    GOALS: Short Term Goals:  6 weeks  1. Patient will report a decrease in cervical pain  <   / =  3/10 to increase tolerance for daily activities..   2. Patient will be able to increase 20 degrees of shoulder ROM on right to improve tolerance to daily activities.   3. Patient will be able to demonstrate an increase of 5  degrees of pain free motion in cervical region to improve mobility.   4. Patient will be able to tolerate HEP to improve ROM and independence with ADL's.  5. Patient will be able to demonstrate proper resting and activity posture to improve shoulder mechanics  with activity.     Long Term Goals: 12 weeks  1. Patient will report a decrease in shoulder pain  <   / =  2/10 at worse to increase tolerance for walking and ADL activities.  2. Patient will be able to increase MMT to 4+/5 in right shoulder ER to increase tolerance for work and golfing activities.   3. Patient will be able to increase MMT to 5/5 in cervical region to increase tolerance for work and golfing activities.  4. Patient will be able to demonstrate an increase of 30 degrees of pain free motion in cervical region to improve mobility and ADLs    5. Patient will be Independent with HEP to improve ROM and independence with ADL's      Plan     Continue with established Plan of Care towards PT goals.       Meghana Strong, PTA  6/7/2018

## 2018-06-11 ENCOUNTER — CLINICAL SUPPORT (OUTPATIENT)
Dept: REHABILITATION | Facility: HOSPITAL | Age: 58
End: 2018-06-11
Attending: NEUROLOGICAL SURGERY
Payer: MEDICAID

## 2018-06-11 DIAGNOSIS — M54.2 NECK PAIN: ICD-10-CM

## 2018-06-11 DIAGNOSIS — M62.81 MUSCLE WEAKNESS: ICD-10-CM

## 2018-06-11 DIAGNOSIS — Z78.9 IMPAIRED MOTOR CONTROL: ICD-10-CM

## 2018-06-11 DIAGNOSIS — M25.619 DECREASED RANGE OF MOTION OF SHOULDER, UNSPECIFIED LATERALITY: ICD-10-CM

## 2018-06-11 PROCEDURE — 97110 THERAPEUTIC EXERCISES: CPT | Mod: PN

## 2018-06-11 NOTE — PROGRESS NOTES
Physical Therapy Daily Note   Name: Cortes Schroeder  Clinic Number: [id number]    Diagnosis:   Encounter Diagnoses   Name Primary?    Neck pain     Muscle weakness     Impaired motor control     Decreased range of motion of shoulder, unspecified laterality      Physician: Moe Dahl MD  Treatment Orders: PT Eval and Treat    Precautions: Diabetic, Impaired hearing   Visit #: 5 of 36  Total visit #: 7  Eval date: 5/10/2018  G-code reported: Initial Evaluation (Visit #1)   Certification period: 5/10/2018 -8/2/2018 (PN Due 7/11/2018)    Subjective     Cortes reports: pain from the base of his skull, down into his low back, and down into his Right foot. Patient states that his back pain is worse than his neck.  Pain Scale:  4 out of 10, currently.    Objective     Time In: 0900  Time Out: 1000  Total Treatment time: 60 minutes (1:1 with PT for 30 mins of treatment session)     Patient performed the following therapeutic exercises for 50 minutes in order to strengthen and stretch cervical region:  Pulleys: 6 minutes (flexion and abduction)   Chin tucks: 20 x 5 sec holds  Scapular retractions: 30x  Upper trap stretch: 3 x 20 seconds   Scalenes stretch: 3 x 20 seconds   Cervical isometrics (lateral and retraction): 20 x 3 sec holds c yellow TB   Supine chin tucks: 20 x 5 sec holds   Standing rows: 2 x 10 x Red TB  Standing shoulder ext: 2 x 10 x Red TB   Supine pec stretch on foam roll: 3 minutes   +DKTC on SB with UE flexion: 3 x 10 (hands clasped)    +Bed mobility (log roll) and postural correction x 5 minutes    Manual therapy performed on cervical region:  10 mins x suboccipital release, Steven upper trap stretch, cervical distraction    Mechanical cervical traction performed for 2,3,4 increments of 17 pound static holds with 30 second rest in between treatment for a total of 10 mintues     Moist heat pack was placed on cervical region for 10 minutes to reduce  tension and stiffness in cervical region.     Written Home Exercises Provided: Patient educated to continue with previously issued HEP in order to complement therapy sessions.   Exercises were reviewed and Cortes was able to demonstrate them prior to the end of the session. Patient received a written copy of exercises to perform at home. Cortes demonstrated good  understanding of the education provided.     Cervical Range of Motion:     Degrees Pain   Flexion 25        Extension 25 Present      Right Side Bending 30        Left Side Bending 27        Right Rotation 40 Present   Left Rotation 45        Shoulder Range of Motion:   Shoulder Left Right   Flexion 150 90/130   Abduction 80/100 60/70     Special Tests:       Right (combined) Left   Quadrant testing Negative Negative    Piriformis Test Positive Positive    Repeated flexion Positive     Prone extension  Negative       Patient presents with limitations and pain with hip IR bilaterally     Lumbar ROM  50% limitations right  75% limitations right  50% limitation lumbar flexion   50% limitation lumbar extension       Neuro Dynamic Testing:  Sciatic nerve:                                            SLR: Positive on right LE               Neural Tension:                             Slump test: Negative for neurological symptoms      Palpation: Patient present with sensitivity present in her right greater trochanter, infraspinatus, supraspinatus tendon     Flexibility:        Right Left   Ely's Test  Positive Positive    Supine 90/90 40 30         Assessment     Patient tolerated treatment session well today reporting reduction in LE and UE radicular symptoms post treatment session. Patient had great tolerance to mechanical cervical traction performed during today's treatment with reduction in right arm pain. Patient was able to tolerate the addition of hamstring stretches bilaterally. Patient still walked out of clinic with noted antalgic gait. Patient will  continue to benefit from skilled PT services in order to address limitations present in problem list and education on proper advancement of HEP.     Patient was reassessed during today's treatment session. Patient presents with slight improvements cervical ROM and pain symptoms. Patient still presents with pain symptoms in LE that was assessed during today's treatment. Patient presents with muscular and neurological tightness present in right LE. Patient presents with some inconsistent and non changing pain present in left LE that may warrant farther medical assessment if not resolved with therapy symptoms. Patient will engage in ROM exercises to improve pain free mobility of lumbar and LE. Patient will continue to engage in activities for the cervical region to reduce presence of radiation of symptoms down his right arm. Patient was able to meet 2/5 short term goals during this assessment period. Patient demonstrates proper posture with cueing but still reverts to his habits with rounded shoulder and forward head during clinic sessions.     Pt's spiritual, cultural and educational needs considered and pt agreeable to plan of care and goals.    Prognosis: Good     Anticipated barriers to physical therapy: none    Medical necessity is demonstrated by the following IMPAIRMENTS/PROBLEM LIST:   1) Increase in pain level limiting function   2) Decreased cervical strength     3) Decreased cervical ROM    4) Muscle weakness    5) Poor posture    6) Lack of HEP    GOALS: Short Term Goals:  6 weeks  1. Patient will report a decrease in cervical pain  <   / =  3/10 to increase tolerance for daily activities. - In progress 80%  2. Patient will be able to increase 20 degrees of shoulder ROM on right to improve tolerance to daily activities.  - In progress   3. Patient will be able to demonstrate an increase of 5  degrees of pain free motion in cervical region to improve mobility. - In progress 80%  4. Patient will be able to  tolerate HEP to improve ROM and independence with ADL's. - MET (6/11/2018)  5. Patient will be able to demonstrate proper resting and activity posture to improve shoulder mechanics with activity. - MET (6/11/2018) inconsistently     Long Term Goals: 12 weeks  1. Patient will report a decrease in shoulder pain  <   / =  2/10 at worse to increase tolerance for walking and ADL activities.  2. Patient will be able to increase MMT to 4+/5 in right shoulder ER to increase tolerance for work and golfing activities.   3. Patient will be able to increase MMT to 5/5 in cervical region to increase tolerance for work and golfing activities.  4. Patient will be able to demonstrate an increase of 30 degrees of pain free motion in cervical region to improve mobility and ADLs    5. Patient will be Independent with HEP to improve ROM and independence with ADL's      Plan     Continue with established Plan of Care towards PT goals. Certification period: 5/10/2018 -8/2/2018 (PN Due 7/11/2018)      Alyson Kearns, PT  6/11/2018

## 2018-06-14 ENCOUNTER — PATIENT MESSAGE (OUTPATIENT)
Dept: FAMILY MEDICINE | Facility: CLINIC | Age: 58
End: 2018-06-14

## 2018-06-14 ENCOUNTER — CLINICAL SUPPORT (OUTPATIENT)
Dept: REHABILITATION | Facility: HOSPITAL | Age: 58
End: 2018-06-14
Attending: NEUROLOGICAL SURGERY
Payer: MEDICAID

## 2018-06-14 DIAGNOSIS — M62.81 MUSCLE WEAKNESS: ICD-10-CM

## 2018-06-14 DIAGNOSIS — M25.619 DECREASED RANGE OF MOTION OF SHOULDER, UNSPECIFIED LATERALITY: ICD-10-CM

## 2018-06-14 DIAGNOSIS — Z78.9 IMPAIRED MOTOR CONTROL: ICD-10-CM

## 2018-06-14 DIAGNOSIS — M54.2 NECK PAIN: ICD-10-CM

## 2018-06-14 PROCEDURE — 97110 THERAPEUTIC EXERCISES: CPT | Mod: PN

## 2018-06-14 RX ORDER — INSULIN LISPRO 100 [IU]/ML
INJECTION, SOLUTION INTRAVENOUS; SUBCUTANEOUS
Qty: 3 VIAL | Refills: 0 | Status: SHIPPED | OUTPATIENT
Start: 2018-06-14 | End: 2018-06-18 | Stop reason: SDUPTHER

## 2018-06-14 NOTE — TELEPHONE ENCOUNTER
Pt requesting refill until he is able to see Violetta Woods, he has OV with her 7/10/18; LOV with you 5/10/18; [id number]- 9.1 on 5/10/18

## 2018-06-15 ENCOUNTER — TELEPHONE (OUTPATIENT)
Dept: FAMILY MEDICINE | Facility: CLINIC | Age: 58
End: 2018-06-15

## 2018-06-15 ENCOUNTER — PATIENT MESSAGE (OUTPATIENT)
Dept: FAMILY MEDICINE | Facility: CLINIC | Age: 58
End: 2018-06-15

## 2018-06-15 NOTE — TELEPHONE ENCOUNTER
----- Message from Marzena Knutson sent at 6/15/2018  4:40 PM CDT -----  Contact: [facility] [place] [phone]  Calling TO get confirmation of how min units Pt injection supposed to be for insulin. It was Sent without

## 2018-06-15 NOTE — TELEPHONE ENCOUNTER
I called patient to inquire on the below, patient stated he uses 100 units daily per pod, he changes his pod every 2 day, patient stated he have enough medication to get him through the weekend . Notified patient I will forward message to  for clarification to notify the pharmacy of instructions.

## 2018-06-18 ENCOUNTER — CLINICAL SUPPORT (OUTPATIENT)
Dept: REHABILITATION | Facility: HOSPITAL | Age: 58
End: 2018-06-18
Attending: NEUROLOGICAL SURGERY
Payer: MEDICAID

## 2018-06-18 DIAGNOSIS — M54.2 NECK PAIN: ICD-10-CM

## 2018-06-18 DIAGNOSIS — Z78.9 IMPAIRED MOTOR CONTROL: ICD-10-CM

## 2018-06-18 DIAGNOSIS — M62.81 MUSCLE WEAKNESS: ICD-10-CM

## 2018-06-18 DIAGNOSIS — M25.619 DECREASED RANGE OF MOTION OF SHOULDER, UNSPECIFIED LATERALITY: ICD-10-CM

## 2018-06-18 RX ORDER — INSULIN LISPRO 100 [IU]/ML
INJECTION, SOLUTION INTRAVENOUS; SUBCUTANEOUS
Qty: 3 VIAL | Refills: 1 | Status: SHIPPED | OUTPATIENT
Start: 2018-06-18 | End: 2018-07-13 | Stop reason: SDUPTHER

## 2018-06-18 NOTE — PROGRESS NOTES
Physical Therapy Daily Note   Name: Cortes Schroeder  Clinic Number: [id number]    Diagnosis:   No diagnosis found.  Physician: Moe Dahl MD  Treatment Orders: PT Eval and Treat    Precautions: Diabetic, Impaired hearing   Visit #: 6 of 36  Total visit #: 8  Eval date: 5/10/2018  G-code reported: Initial Evaluation (Visit #1)   Certification period: 5/10/2018 - 8/2/2018 (PN Due 7/11/2018)    Subjective     Cortes reports: that he feels a little bit better today and was actually able to sleep a little bit last night. Patient reports pain down into his Right foot this morning.  Pain Scale:  3 out of 10 in his NECK and a 3 out of 10 in his BACK, currently.    Objective     Time In: 0852   Time Out: 1007   Total Treatment time: 65 minutes (1:1 with PT for 38 mins of treatment session)     Patient performed the following therapeutic exercises for 55 minutes in order to strengthen and stretch cervical and lumbar region:  +Nustep: 7 minutes - Level 1.5 (collected subjective data, reviewed HEP)  Pulleys: 6 minutes (flexion and abduction)   Chin tucks: 20 x 5 sec holds  Scapular retractions: 30x  Upper trap stretch: 3 x 20 seconds   Scalenes stretch: 3 x 20 seconds   Cervical isometrics (lateral and retraction): 20 x 3 sec holds c yellow TB   Supine chin tucks: 20 x 5 sec holds   Standing rows: 2 x 10 x Red TB  Standing shoulder ext: 2 x 10 x Red TB   Supine pec stretch on foam roll: 3 minutes   DKTC on SB with UE flexion: 3 x 10 (hands clasped)   +Pelvic tilts (isoflexion with red ball): 2 x 10 x 3 sec hold  +Supine hamstring stretch: 4 x 20 sec hold ea.  +Calf stretch on incline board: 4 x 20 sec hold  +Seated sciatic nerve glides: 2 x 10 (LAQ with active ankle DF)    Bed mobility (log roll) and postural correction x 5 minutes    Manual therapy performed on cervical region:  00 mins x suboccipital release, Steven upper trap stretch, cervical distraction    Mechanical  cervical traction performed for 2 min, 4 min, and 4 min increments of 20 pound static holds with 30 second rest in between treatment for a total of 10 minutes.    Moist heat pack was placed on cervical region for 10 minutes to reduce tension and stiffness in cervical region.     Written Home Exercises Provided: Patient educated to continue with previously issued HEP in order to complement therapy sessions.   Exercises were reviewed and Cortes was able to demonstrate them prior to the end of the session. Patient received a written copy of exercises to perform at home. Cortes demonstrated good  understanding of the education provided.     Assessment     Patient tolerated treatment session fairly well today. Good tolerance to exercises performed reporting no exacerbation of low back or neck pain. Patient reported Right sided head pain with supine hamstring stretch, however pain diminished after the first repetition. Patient challenged with pelvic tilts requiring use of swiss ball for pelvic rotation. Patient reported centralization of Right LE radicular symptoms into his low back post treatment session as well as decreased Right UE pain. Patient will continue to benefit from skilled PT services in order to address limitations present in problem list and education on proper advancement of HEP.     Pt's spiritual, cultural and educational needs considered and pt agreeable to plan of care and goals.    Prognosis: Good     Anticipated barriers to physical therapy: none    Medical necessity is demonstrated by the following IMPAIRMENTS/PROBLEM LIST:   1) Increase in pain level limiting function   2) Decreased cervical strength     3) Decreased cervical ROM    4) Muscle weakness    5) Poor posture    6) Lack of HEP    GOALS: Short Term Goals:  6 weeks  1. Patient will report a decrease in cervical pain  <   / =  3/10 to increase tolerance for daily activities. - In progress 80%  2. Patient will be able to increase 20  degrees of shoulder ROM on right to improve tolerance to daily activities.  - In progress   3. Patient will be able to demonstrate an increase of 5  degrees of pain free motion in cervical region to improve mobility. - In progress 80%  4. Patient will be able to tolerate HEP to improve ROM and independence with ADL's. - MET (6/11/2018)  5. Patient will be able to demonstrate proper resting and activity posture to improve shoulder mechanics with activity. - MET (6/11/2018) inconsistently     Long Term Goals: 12 weeks  1. Patient will report a decrease in shoulder pain  <   / =  2/10 at worse to increase tolerance for walking and ADL activities.  2. Patient will be able to increase MMT to 4+/5 in right shoulder ER to increase tolerance for work and golfing activities.   3. Patient will be able to increase MMT to 5/5 in cervical region to increase tolerance for work and golfing activities.  4. Patient will be able to demonstrate an increase of 30 degrees of pain free motion in cervical region to improve mobility and ADLs    5. Patient will be Independent with HEP to improve ROM and independence with ADL's    Plan     Continue with established Plan of Care towards PT goals.     Certification period: 5/10/2018 -8/2/2018 (PN Due 7/11/2018)    Alyson TOLLIVER Urmila, PT  6/18/2018

## 2018-06-19 ENCOUNTER — TELEPHONE (OUTPATIENT)
Dept: FAMILY MEDICINE | Facility: CLINIC | Age: 58
End: 2018-06-19

## 2018-06-19 ENCOUNTER — PATIENT MESSAGE (OUTPATIENT)
Dept: FAMILY MEDICINE | Facility: CLINIC | Age: 58
End: 2018-06-19

## 2018-06-19 ENCOUNTER — NURSE TRIAGE (OUTPATIENT)
Dept: ADMINISTRATIVE | Facility: CLINIC | Age: 58
End: 2018-06-19

## 2018-06-19 DIAGNOSIS — F32.A DEPRESSION, UNSPECIFIED DEPRESSION TYPE: Primary | ICD-10-CM

## 2018-06-19 NOTE — TELEPHONE ENCOUNTER
Called to check on the patient concerning the Nurse Triage message. Patient states he was not suicidal but depressed. Patient requested a referral to psychiatry. When asked how patient was taking Truvada, patient states in the morning, usually with breakfast. I instructed the patient to start taking the Truvada at night and without food. Patient verbalized understanding. Patient was instructed to go to the ER or seek help from family or friends if he felt like harming himself. Patient states he will go to the ER if he feels too overwhelmed. No further questions at this time.    *Patient is not

## 2018-06-19 NOTE — TELEPHONE ENCOUNTER
Reason for Disposition   Depression and unable to do any of normal activities (e.g., self care, school, work; in comparison to baseline).    Protocols used: ST DEPRESSION-A-[place]  pt's call was transferred from Dr. Artis's office to triage- pt is reporting feeling depressed- denies suicidal thoughts but states that he has thought about death. Pt is with his wife at this time. Refusing ER. Only wants to speak to PCP. Please contact to advise

## 2018-06-19 NOTE — TELEPHONE ENCOUNTER
"I called pt and he states that his depression continues to worsen despite cymbalta.     Pt denies SI or HI, but "feels tired" and doesn't care if he dies.    Fortunately he does have a scheduled an appointment to see a psychiatrist in 5 days at [facility].     pt denies the need to see me sooner or for any other assistance.     He know he can call to see me if he needs anything.   "

## 2018-06-19 NOTE — TELEPHONE ENCOUNTER
Someone from the phone room called the emergency phone stating they had the patient's therapist on the line because the patient was in the office stating that a medication that he was on was causing him to want to kill himself. Once phone room attempted to transfer the call, the phone was disconnected. Unable to know who called from the phone room or patient's therapist name to return call.

## 2018-06-20 ENCOUNTER — PATIENT MESSAGE (OUTPATIENT)
Dept: FAMILY MEDICINE | Facility: CLINIC | Age: 58
End: 2018-06-20

## 2018-06-20 DIAGNOSIS — Z79.4 UNCONTROLLED TYPE 2 DIABETES MELLITUS WITH HYPERGLYCEMIA, WITH LONG-TERM CURRENT USE OF INSULIN: ICD-10-CM

## 2018-06-20 DIAGNOSIS — E11.65 UNCONTROLLED TYPE 2 DIABETES MELLITUS WITH HYPERGLYCEMIA, WITH LONG-TERM CURRENT USE OF INSULIN: ICD-10-CM

## 2018-06-20 RX ORDER — METFORMIN HYDROCHLORIDE 1000 MG/1
TABLET ORAL
Qty: 180 TABLET | Refills: 0 | Status: SHIPPED | OUTPATIENT
Start: 2018-06-20 | End: 2018-09-13 | Stop reason: SDUPTHER

## 2018-06-21 ENCOUNTER — CLINICAL SUPPORT (OUTPATIENT)
Dept: REHABILITATION | Facility: HOSPITAL | Age: 58
End: 2018-06-21
Attending: NEUROLOGICAL SURGERY
Payer: MEDICAID

## 2018-06-21 DIAGNOSIS — M62.81 MUSCLE WEAKNESS: ICD-10-CM

## 2018-06-21 DIAGNOSIS — Z78.9 IMPAIRED MOTOR CONTROL: ICD-10-CM

## 2018-06-21 DIAGNOSIS — M25.619 DECREASED RANGE OF MOTION OF SHOULDER, UNSPECIFIED LATERALITY: ICD-10-CM

## 2018-06-21 DIAGNOSIS — M54.2 NECK PAIN: ICD-10-CM

## 2018-06-21 PROCEDURE — 97110 THERAPEUTIC EXERCISES: CPT | Mod: PN

## 2018-06-21 NOTE — PROGRESS NOTES
Physical Therapy Daily Note   Name: Cortes Schroeder  Clinic Number: [id number]    Diagnosis:   Encounter Diagnoses   Name Primary?    Neck pain     Muscle weakness     Impaired motor control     Decreased range of motion of shoulder, unspecified laterality      Physician: Moe Dahl MD  Treatment Orders: PT Eval and Treat    Precautions: Diabetic, Impaired hearing   Visit #: 6 of 36  Total visit #: 8  Eval date: 5/10/2018  G-code reported: Initial Evaluation (Visit #1)   Certification period: 5/10/2018 - 8/2/2018 (PN Due 7/11/2018)    Subjective     Cortes reports: that he is feeling a lot better than he did the other day. Patient indicates that he just really had a bad day the other day and could not sleep for more than an hour.     Pain Scale:  3 out of 10 in his NECK and a 3 out of 10 in his BACK, currently.    Objective     Time In: 0900  Time Out: 1000  Total Treatment time: 60 minutes (1:1 with PT for 30 mins of treatment session)     Patient performed the following therapeutic exercises for 55 minutes in order to strengthen and stretch cervical and lumbar region:  +Nustep: 7 minutes - Level 1.5   Pulleys: 6 minutes (flexion and abduction)   Chin tucks: 20 x 5 sec holds  Scapular retractions: 30x  Upper trap stretch: 3 x 20 seconds   Scalenes stretch: 3 x 20 seconds   Cervical isometrics (lateral and retraction): 20 x 3 sec holds c yellow TB   Supine chin tucks: 20 x 5 sec holds   Standing rows: 2 x 10 x Red TB  Standing shoulder ext: 2 x 10 x Red TB   Supine pec stretch on foam roll: 3 minutes   DKTC on SB with UE flexion: 3 x 10 (hands clasped)   +Pelvic tilts (isoflexion with red ball): 2 x 10 x 3 sec hold  +Supine hamstring stretch: 4 x 20 sec hold ea.  +Calf stretch on incline board: 4 x 20 sec hold  + Supine Seated sciatic nerve glides: 2 x 10 (LAQ with active ankle DF)    Bed mobility (log roll) and postural correction x 5 minutes    Manual  therapy performed on cervical region:  00 mins x suboccipital release, Steven upper trap stretch, cervical distraction  Lumbar pelvic distraction 5 minutes    Mechanical cervical traction performed for 8 min increments of 14 pound static holds     Moist heat pack was placed on cervical region for 10 minutes to reduce tension and stiffness in cervical region.     Written Home Exercises Provided: Patient educated to continue with previously issued HEP in order to complement therapy sessions.   Exercises were reviewed and Cortes was able to demonstrate them prior to the end of the session. Patient received a written copy of exercises to perform at home. Cortes demonstrated good  understanding of the education provided.     Assessment     Patient tolerated treatment session fairly well today. Patient indicates pain symptoms during lumbar traction performed in clinic today. Patient continues to have changing symptoms with physical therapy services. Patient indicates some short term relief but no lasting effects. Patient came into clinic the other day and seemed to be on the verge of tears and indicated having a bad day and he has not been able to sleep for more than about an hour. Patient indicated he was feeling better today but still presents with inconsistent symptoms. Patient's referred doctor was contracted today to discuss further treatment alternative and assessment because of the limited changes. Patient will be kept on the schedule for now unless otherwise noted by doctor. Patient will continue to benefit from skilled PT services in order to address limitations present in problem list and education on proper advancement of HEP.     Pt's spiritual, cultural and educational needs considered and pt agreeable to plan of care and goals.    Prognosis: Good     Anticipated barriers to physical therapy: none    Medical necessity is demonstrated by the following IMPAIRMENTS/PROBLEM LIST:   1) Increase in pain level  limiting function   2) Decreased cervical strength     3) Decreased cervical ROM    4) Muscle weakness    5) Poor posture    6) Lack of HEP    GOALS: Short Term Goals:  6 weeks  1. Patient will report a decrease in cervical pain  <   / =  3/10 to increase tolerance for daily activities. - In progress 80%  2. Patient will be able to increase 20 degrees of shoulder ROM on right to improve tolerance to daily activities.  - In progress   3. Patient will be able to demonstrate an increase of 5  degrees of pain free motion in cervical region to improve mobility. - In progress 80%  4. Patient will be able to tolerate HEP to improve ROM and independence with ADL's. - MET (6/11/2018)  5. Patient will be able to demonstrate proper resting and activity posture to improve shoulder mechanics with activity. - MET (6/11/2018) inconsistently     Long Term Goals: 12 weeks  1. Patient will report a decrease in shoulder pain  <   / =  2/10 at worse to increase tolerance for walking and ADL activities.  2. Patient will be able to increase MMT to 4+/5 in right shoulder ER to increase tolerance for work and golfing activities.   3. Patient will be able to increase MMT to 5/5 in cervical region to increase tolerance for work and golfing activities.  4. Patient will be able to demonstrate an increase of 30 degrees of pain free motion in cervical region to improve mobility and ADLs    5. Patient will be Independent with HEP to improve ROM and independence with ADL's    Plan     Continue with established Plan of Care towards PT goals.     Certification period: 5/10/2018 -8/2/2018 (PN Due 7/11/2018)    Alyson Kearns, PT  6/21/2018

## 2018-06-22 DIAGNOSIS — Z20.6 EXPOSURE TO HIV: ICD-10-CM

## 2018-06-22 RX ORDER — EMTRICITABINE AND TENOFOVIR DISOPROXIL FUMARATE 200; 300 MG/1; MG/1
1 TABLET, FILM COATED ORAL DAILY
Qty: 30 TABLET | Refills: 0 | Status: SHIPPED | OUTPATIENT
Start: 2018-06-22 | End: 2018-07-16 | Stop reason: SDUPTHER

## 2018-06-28 ENCOUNTER — CLINICAL SUPPORT (OUTPATIENT)
Dept: REHABILITATION | Facility: HOSPITAL | Age: 58
End: 2018-06-28
Attending: NEUROLOGICAL SURGERY
Payer: MEDICAID

## 2018-06-28 DIAGNOSIS — M62.81 MUSCLE WEAKNESS: ICD-10-CM

## 2018-06-28 DIAGNOSIS — M25.619 DECREASED RANGE OF MOTION OF SHOULDER, UNSPECIFIED LATERALITY: ICD-10-CM

## 2018-06-28 DIAGNOSIS — Z78.9 IMPAIRED MOTOR CONTROL: ICD-10-CM

## 2018-06-28 DIAGNOSIS — M54.2 NECK PAIN: ICD-10-CM

## 2018-06-28 PROCEDURE — 97110 THERAPEUTIC EXERCISES: CPT | Mod: PN

## 2018-06-28 NOTE — PROGRESS NOTES
Physical Therapy Daily Note   Name: Cortes Schroeder  Clinic Number: [id number]    Diagnosis:   Encounter Diagnoses   Name Primary?    Neck pain     Muscle weakness     Impaired motor control     Decreased range of motion of shoulder, unspecified laterality      Physician: Moe Dahl MD  Treatment Orders: PT Eval and Treat    Precautions: Diabetic, Impaired hearing   Visit #: 7 of 36  Total visit #: 8  Eval date: 5/10/2018  G-code reported: Initial Evaluation (Visit #1)   Certification period: 5/10/2018 - 8/2/2018 (PN Due 7/11/2018)    Subjective     Cortes reports: that his medications were changed for his depression that has made him quite drowsy. Patient indicates that he has had some easing of his pain symptoms in his neck and primary pain present in his low back today.      Pain Scale:  3-4 out of 10 in his low back currently     Objective     Time In: 0900  Time Out: 1000  Total Treatment time: 60 minutes (1:1 with PT for 30 mins of treatment session)     Patient performed the following therapeutic exercises for 55 minutes in order to strengthen and stretch cervical and lumbar region:  Nustep: 7 minutes - Level 1.5   Pulleys: 6 minutes (flexion and abduction)   Chin tucks: 20 x 5 sec holds  Scapular retractions: 30x  Upper trap stretch: 3 x 20 seconds   Scalenes stretch: 3 x 20 seconds   Cervical isometrics (lateral and retraction): 20 x 3 sec holds c yellow TB   Supine chin tucks: 20 x 5 sec holds    Standing rows: 2 x 10 x Red TB  Standing shoulder ext: 2 x 10 x Red TB   Supine pec stretch on foam roll: 3 minutes   DKTC on SB with UE flexion: 3 x 10 (hands clasped)   Pelvic tilts (isoflexion with red ball): 2 x 10 x 3 sec hold  +Supine hamstring stretch: 4 x 20 sec hold ea.  Calf stretch on incline board: 4 x 20 sec hold  Supine Seated sciatic nerve glides: 2 x 10 (LAQ with active ankle DF)    Bed mobility (log roll) and postural correction x 5  minutes    Manual therapy performed on cervical region:  00 mins x suboccipital release, Stveen upper trap stretch, cervical distraction  Lumbar pelvic distraction 8 minutes    Mechanical cervical traction performed for 8 min increments of 14 pound static holds     Moist heat pack was placed on cervical region for 10 minutes to reduce tension and stiffness in cervical region.     Written Home Exercises Provided: Patient educated to continue with previously issued HEP in order to complement therapy sessions.   Exercises were reviewed and Cortes was able to demonstrate them prior to the end of the session. Patient received a written copy of exercises to perform at home. Cortes demonstrated good  understanding of the education provided.     Assessment     Patient tolerated treatment session well today. Patient did well with traction of lumbar and cervical region once again today. Patient had a decrease in pain symptoms to 1/10 before manual lumbar traction. Patient had increase back with sciatic nerve glides which will be removed from program. Patient will continue to benefit from skilled PT services in order to address limitations present in problem list and education on proper advancement of HEP.     Pt's spiritual, cultural and educational needs considered and pt agreeable to plan of care and goals.    Prognosis: Good     Anticipated barriers to physical therapy: none    Medical necessity is demonstrated by the following IMPAIRMENTS/PROBLEM LIST:   1) Increase in pain level limiting function   2) Decreased cervical strength     3) Decreased cervical ROM    4) Muscle weakness    5) Poor posture    6) Lack of HEP    GOALS: Short Term Goals:  6 weeks  1. Patient will report a decrease in cervical pain  <   / =  3/10 to increase tolerance for daily activities. - In progress 80%  2. Patient will be able to increase 20 degrees of shoulder ROM on right to improve tolerance to daily activities.  - In progress   3. Patient  will be able to demonstrate an increase of 5  degrees of pain free motion in cervical region to improve mobility. - In progress 80%  4. Patient will be able to tolerate HEP to improve ROM and independence with ADL's. - MET (6/11/2018)  5. Patient will be able to demonstrate proper resting and activity posture to improve shoulder mechanics with activity. - MET (6/11/2018) inconsistently     Long Term Goals: 12 weeks  1. Patient will report a decrease in shoulder pain  <   / =  2/10 at worse to increase tolerance for walking and ADL activities.  2. Patient will be able to increase MMT to 4+/5 in right shoulder ER to increase tolerance for work and golfing activities.   3. Patient will be able to increase MMT to 5/5 in cervical region to increase tolerance for work and golfing activities.  4. Patient will be able to demonstrate an increase of 30 degrees of pain free motion in cervical region to improve mobility and ADLs    5. Patient will be Independent with HEP to improve ROM and independence with ADL's    Plan     Continue with established Plan of Care towards PT goals.     Certification period: 5/10/2018 -8/2/2018 (PN Due 7/11/2018)    Alyson TOLLIVER [facility], PT  6/28/2018

## 2018-07-09 ENCOUNTER — TELEPHONE (OUTPATIENT)
Dept: ENDOCRINOLOGY | Facility: CLINIC | Age: 58
End: 2018-07-09

## 2018-07-10 ENCOUNTER — OFFICE VISIT (OUTPATIENT)
Dept: FAMILY MEDICINE | Facility: CLINIC | Age: 58
End: 2018-07-10
Payer: MEDICAID

## 2018-07-10 ENCOUNTER — OFFICE VISIT (OUTPATIENT)
Dept: ENDOCRINOLOGY | Facility: CLINIC | Age: 58
End: 2018-07-10
Payer: MEDICAID

## 2018-07-10 VITALS
HEIGHT: 65 IN | HEART RATE: 90 BPM | DIASTOLIC BLOOD PRESSURE: 82 MMHG | SYSTOLIC BLOOD PRESSURE: 131 MMHG | BODY MASS INDEX: 33.13 KG/M2 | WEIGHT: 198.88 LBS

## 2018-07-10 VITALS
TEMPERATURE: 99 F | HEIGHT: 65 IN | OXYGEN SATURATION: 96 % | SYSTOLIC BLOOD PRESSURE: 118 MMHG | DIASTOLIC BLOOD PRESSURE: 80 MMHG | RESPIRATION RATE: 16 BRPM | WEIGHT: 199.75 LBS | HEART RATE: 99 BPM | BODY MASS INDEX: 33.28 KG/M2

## 2018-07-10 DIAGNOSIS — E11.3299 NPDR (NONPROLIFERATIVE DIABETIC RETINOPATHY): ICD-10-CM

## 2018-07-10 DIAGNOSIS — M54.2 CHRONIC NECK PAIN: ICD-10-CM

## 2018-07-10 DIAGNOSIS — F41.9 ANXIETY: ICD-10-CM

## 2018-07-10 DIAGNOSIS — G89.29 CHRONIC BILATERAL LOW BACK PAIN WITHOUT SCIATICA: ICD-10-CM

## 2018-07-10 DIAGNOSIS — G47.00 INSOMNIA, UNSPECIFIED TYPE: Primary | ICD-10-CM

## 2018-07-10 DIAGNOSIS — R80.9 MICROALBUMINURIA: ICD-10-CM

## 2018-07-10 DIAGNOSIS — Z20.6 EXPOSURE TO HIV: ICD-10-CM

## 2018-07-10 DIAGNOSIS — M54.50 CHRONIC BILATERAL LOW BACK PAIN WITHOUT SCIATICA: ICD-10-CM

## 2018-07-10 DIAGNOSIS — E66.9 NON MORBID OBESITY, UNSPECIFIED OBESITY TYPE: ICD-10-CM

## 2018-07-10 DIAGNOSIS — I10 ESSENTIAL HYPERTENSION: ICD-10-CM

## 2018-07-10 DIAGNOSIS — G89.29 CHRONIC NECK PAIN: ICD-10-CM

## 2018-07-10 LAB — GLUCOSE SERPL-MCNC: 314 MG/DL (ref 70–110)

## 2018-07-10 PROCEDURE — 99214 OFFICE O/P EST MOD 30 MIN: CPT | Mod: S$PBB,,, | Performed by: FAMILY MEDICINE

## 2018-07-10 PROCEDURE — 99999 PR PBB SHADOW E&M-EST. PATIENT-LVL V: CPT | Mod: PBBFAC,,, | Performed by: NURSE PRACTITIONER

## 2018-07-10 PROCEDURE — 99214 OFFICE O/P EST MOD 30 MIN: CPT | Mod: S$PBB,,, | Performed by: NURSE PRACTITIONER

## 2018-07-10 PROCEDURE — 99215 OFFICE O/P EST HI 40 MIN: CPT | Mod: PBBFAC,PN | Performed by: NURSE PRACTITIONER

## 2018-07-10 PROCEDURE — 99214 OFFICE O/P EST MOD 30 MIN: CPT | Mod: PBBFAC,27,PO | Performed by: FAMILY MEDICINE

## 2018-07-10 PROCEDURE — 99999 PR PBB SHADOW E&M-EST. PATIENT-LVL IV: CPT | Mod: PBBFAC,,, | Performed by: FAMILY MEDICINE

## 2018-07-10 PROCEDURE — 82948 REAGENT STRIP/BLOOD GLUCOSE: CPT | Mod: PBBFAC,PN | Performed by: NURSE PRACTITIONER

## 2018-07-10 RX ORDER — AMITRIPTYLINE HYDROCHLORIDE 100 MG/1
100 TABLET ORAL NIGHTLY
COMMUNITY
End: 2018-07-16

## 2018-07-10 NOTE — LETTER
July 10, 2018      Hira Acosta MD  [address] [facility] [place] [address]           [place] - Endo/Diabetes  [address] [facility], [address]  [place] [address]  Phone: [phone]  Fax: [phone]          Patient: Cortes Schroeder   MR Number: [id number]   YOB: 1960   Date of Visit: 7/10/2018       Dear Dr. Hira Acosta:    Thank you for referring Cortes Schroeder to me for evaluation. Attached you will find relevant portions of my assessment and plan of care.    If you have questions, please do not hesitate to call me. I look forward to following Cortes Schroeder along with you.    Sincerely,    Violetta Woods NP    Enclosure  CC:  No Recipients    If you would like to receive this communication electronically, please contact [email or web address] or [phone] to request more information on [facility] access.    For providers and/or their staff who would like to refer a patient to [facility], please contact us through our one-stop-shop provider referral line, [facility], at [phone].    If you feel you have received this communication in error or would no longer like to receive these types of communications, please e-mail [email or web address]

## 2018-07-10 NOTE — PROGRESS NOTES
Subjective:       Patient ID: Cortes Schroeder is a 58 y.o. male.    Chief Complaint: Diabetes (2 months follow up )    HPI    DM2 - Pt saw Violetta this am and is due to see podiatrist and ophthalmology next week. Pt does admit to forgetting to bolus at times.     Exposure to HIV - PREP - pt is doing well on prep and has no side effects! He is adherent!    Anxiety and depression - pt is currently on 10mg at night to help him sleep and help with his anxiety and depression.   It has worked well for him so far.     Chronic pain in neck radiating to the R arm and low back. Followed by [facility]. Next appointment within the next month. His pain fluctuates from minor to excruciating without notable rhyme or reason.            Outpatient Prescriptions Marked as Taking for the 7/10/18 encounter (Office Visit) with Hira Acosta MD   Medication Sig Dispense Refill    amitriptyline (ELAVIL) 100 MG tablet Take 100 mg by mouth every evening.      aspirin (ECOTRIN) 81 MG EC tablet Take 1 tablet (81 mg total) by mouth once daily.  0    blood sugar diagnostic (FREESTYLE INSULINX TEST STRIPS) Strp 1 strip by Misc.(Non-Drug; Combo Route) route 4 (four) times daily before meals and nightly. 200 strip 8    clotrimazole-betamethasone 1-0.05% (LOTRISONE) cream Apply topically 2 (two) times daily. 15 g 5    cyanocobalamin (VITAMIN B-12) 100 MCG tablet Take 100 mcg by mouth once daily.      DULoxetine (CYMBALTA) 60 MG capsule Take 1 capsule (60 mg total) by mouth once daily. 30 capsule 3    emtricitabine-tenofovir 200-300 mg (TRUVADA) 200-300 mg Tab Take 1 tablet by mouth once daily. 30 tablet 0    fish oil-omega-3 fatty acids 300-1,000 mg capsule Take by mouth 2 (two) times daily.      gabapentin (NEURONTIN) 100 MG capsule TAKE 1 TO 2 CAPSULES(100  MG) BY MOUTH EVERY EVENING 60 capsule 11    insulin lispro (HUMALOG) 100 unit/mL injection Pt to use 100 units per day with automated insulin pump as directed by  endocrinology. 3 vial 1    losartan (COZAAR) 50 MG tablet Take 1 tablet (50 mg total) by mouth once daily. 90 tablet 3    magnesium oxide-Mg AA chelate (MG-PLUS-PROTEIN) 133 mg Tab Take 500 mg by mouth every evening.       metFORMIN (GLUCOPHAGE) 1000 MG tablet TAKE 1 TABLET(1000 MG) BY MOUTH TWICE DAILY WITH MEALS 180 tablet 0    pravastatin (PRAVACHOL) 80 MG tablet Take 1 tablet (80 mg total) by mouth once daily. 90 tablet 3    PROMETHAZINE/DEXTROMETHORPHAN (PROMETHAZINE-DM ORAL) Take by mouth as needed.      PROPYLENE GLYCOL//PF (SYSTANE, PF, OPHT) Apply to eye 4 (four) times daily.      SITagliptin (JANUVIA) 100 MG Tab Take 1 tablet (100 mg total) by mouth once daily. 90 tablet 3    subcutaneous insulin pump (OMNIPOD INSULIN MANAGEMENT MISC) by Misc.(Non-Drug; Combo Route) route.      syringe, disposable, 1 mL Syrg Use as directed 100 Syringe 11    TRUE METRIX GLUCOSE TEST STRIP Strp USE TID  0    vitamin D 1000 units Tab Take 185 mg by mouth 2 (two) times daily.          Past Medical History:   Diagnosis Date    Anxiety 12/18/2012    Back pain 12/18/2012    Cataract     Chronic pain syndrome 4/24/2016    Diabetes type 2, controlled 2/20/2016    Diabetic retinopathy     DM (diabetes mellitus) 12/18/2012    DM (diabetes mellitus), type 2, uncontrolled 11/16/2013    Essential hypertension 2/20/2016    Gastroesophageal reflux disease without esophagitis 2/20/2016    HIV (human immunodeficiency virus infection)     Hyperlipidemia 12/18/2012    Insomnia 8/7/2014    Neuropathy 11/16/2013       Family History   Problem Relation Age of Onset    Cataracts Father     Hypertension Father     Parkinsonism Father     Alzheimer's disease Father     Migraines Mother     Hypertension Mother     Heart attack Mother     Amblyopia Neg Hx     Blindness Neg Hx     Glaucoma Neg Hx     Macular degeneration Neg Hx     Retinal detachment Neg Hx     Strabismus Neg Hx         reports that he has  never smoked. He has never used smokeless tobacco. He reports that he drinks about 0.6 oz of alcohol per week . He reports that he does not use drugs.    Review of Systems   Constitutional: Negative for activity change and unexpected weight change.   HENT: Positive for hearing loss. Negative for rhinorrhea and trouble swallowing.    Eyes: Positive for visual disturbance. Negative for discharge.   Respiratory: Negative for chest tightness and wheezing.    Cardiovascular: Negative for chest pain and palpitations.   Gastrointestinal: Negative for blood in stool, constipation, diarrhea and vomiting.   Endocrine: Negative for polydipsia and polyuria.   Genitourinary: Negative for difficulty urinating, hematuria and urgency.   Musculoskeletal: Positive for arthralgias and neck pain. Negative for joint swelling.   Neurological: Positive for headaches. Negative for weakness.   Psychiatric/Behavioral: Positive for dysphoric mood. Negative for confusion.       Objective:     Vitals:    07/10/18 1248   BP: 118/80   Pulse: 99   Resp: 16   Temp: 98.6 °F (37 °C)        Physical Exam   Constitutional: He appears well-developed. No distress.   O   HENT:   Head: Normocephalic and atraumatic.   Eyes: Conjunctivae are normal. No scleral icterus.   Pulmonary/Chest: Effort normal.   Neurological: He is alert.   Psychiatric: He has a normal mood and affect. His behavior is normal.   Nursing note and vitals reviewed.      Assessment:       1. Insomnia, unspecified type    2. Chronic bilateral low back pain without sciatica    3. Uncontrolled type 2 diabetes mellitus with diabetic polyneuropathy, with long-term current use of insulin    4. Chronic neck pain    5. Anxiety    6. Exposure to HIV        Plan:       Cortes was seen today for diabetes.    Diagnoses and all orders for this visit:    Insomnia, unspecified type  - Chronic - stable - amitriptyline 10mg.     Pt is doing well on current therapy. No side effects noted. Will continue  current therapy.    Chronic bilateral low back pain without sciatica  - Chronic - stable     Pt is doing well on current therapy. No side effects noted. Will continue current therapy.    Uncontrolled type 2 diabetes mellitus with diabetic polyneuropathy, with long-term current use of insulin  - Chronic - stable     Pt is doing well on current therapy. No side effects noted. Will continue current therapy.    Chronic neck pain  F/u with casie    Anxiety  Continue Cymbalta and amitry - f/u       Exposure to HIV  Continue preop and f/u with Dr Artis          Follow-up in about 3 months (around 10/10/2018) for Diabetes Type 2, Hypertension, anxiety.        Pt verbalized understanding and agreed with our plan.

## 2018-07-10 NOTE — PROGRESS NOTES
CC: This 58 y.o. White male  is here for evaluation of  T2DM along with comorbidities indicated in the Visit Diagnosis section of this encounter.    HPI: Cortes Schroeder was diagnosed with T2DM in 1995. No medications started until 1999 under Dr. Guerrero's care. He was on an [place] insulin pump under Dr. Marx's care for several years until ~ 2015 but stopped it bc of the cost of insulin pump supplies.     Pt was previously followed by Dr. Mills but she no longer takes Medicaid. Pt started using an Omnipod in March and overall BGs have improved. The only problem he has is hearing the alarm beep when his insulin supply is low.     New to Endocrine at [facility]. Referred by Dr. Acosta.   Finds that his BG rises overnight and his FBGs are always high. However, upon review of his insulin pump report, his BGs are actually highest in the evenings. Also, he states he is consistently bolusing for dinner at night but this is not reflected in his insulin pump report, which reveals very few boluses at night. Does report he has been sleeping a lot because he was on a high dose of amitriptyline so perhaps this affected his memory and adherence. Amitriptyline dose will be halved to 50 mg starting tonight.       HOSPITALIZED FOR DIABETES OR RELATED COMPLICATIONS -  Yes -   DKA presumably r/t flu - Feb 2018   DKA 11/2016  DKA 2/2015    PRESCRIBED DIABETES MEDICATIONS: metformin 1000 mg bid, Januvia 100 mg once daily, Humalog in Omnipod pump   Misses medication doses - Yes - forgets to bolus for food on time 1/2 the time and will take it later when he gets home. Also doesn't remember to bolus for fruit snacks.     Doesn't carry PDM with him when he leaves the house.   Tends to bolus for set meals under 15, 30, 45, 60 grams carbs. He does feel comfortable with carb counting.   FBG today ron at 331 bc of cheesecake last night also with paulino from [employer].     DM COMPLICATIONS: nephropathy and peripheral  "neuropathy    SIGNIFICANT DIABETES MED HISTORY:   DKA on Invokana in 2/2015   Switched from Novolog 70/30 to toujeo/novolog ~ January 2018     SELF MONITORING BLOOD GLUCOSE: Checks blood glucose at home 2.5x/day. Average glucose 243, ranges 103-377    HYPOGLYCEMIC EPISODES: none     CURRENT DIET: eats 1-2 meals/day. Rarely eats 3 meals/day. Usually eats dinner every night. Likes salad for lunch. Has been off his diet lately.   Loves [employer] and has chips with salsa, 3 tacos with a frozen wallace (which may be as much as 60 grams of carbs). "Tuesdays are my bad days."     CURRENT EXERCISE: none d/t back pain     SOCIAL: his friend who has DM lives with him.       /82 (BP Location: Left arm, Patient Position: Sitting, BP Method: Large (Automatic))   Pulse 90   Ht 5' 5" (1.651 m)   Wt 90.2 kg (198 lb 13.7 oz)   BMI 33.09 kg/m²       ROS:   CONSTITUTIONAL: Appetite good, + fatigue  SKIN: No rash or pruritis   EYES: + visual disturbances to right eye, followed by Dr. Mercedes   RESPIRATORY: No shortness of breath or cough  CARDIAC: + chest pain fleeting  x 1 week; none active now   GI: No nausea, vomiting, or diarrhea  : No urinary frequency or dysuria   MS: + chronic back and neck pain   NEURO: + paresthesias   PSYCH: + depression        PHYSICAL EXAM:  GENERAL: Well developed, well nourished. No acute distress.   PSYCH: AAOx3, appropriate mood and affect, conversant, well-groomed. Judgement and insight good.   NEURO: Cranial nerves grossly intact. Speech clear, no tremor.   NECK: Trachea midline, no thyromegaly or lymphadenopathy.   CHEST: Respirations even and unlabored. CTA bilaterally.  CARDIOVASCULAR: Regular rate and rhythm. No bruits. No murmur. No edema.   ABDOMEN: Soft, non-tender, non-distended. Bowel sounds present.   MS: Gait steady. No clubbing.   SKIN: Normal skin turgor. Skin warm and dry. No areas of breakdown. No acanthosis nigricans.        Hemoglobin A1C   Date Value Ref Range Status "   05/10/2018 9.1 (H) 4.0 - 5.6 % Final     Comment:     According to ADA guidelines, hemoglobin A1c <7.0% represents  optimal control in non-pregnant diabetic patients. Different  metrics may apply to specific patient populations.   Standards of Medical Care in Diabetes-2016.  For the purpose of screening for the presence of diabetes:  <5.7%     Consistent with the absence of diabetes  5.7-6.4%  Consistent with increasing risk for diabetes   (prediabetes)  >or=6.5%  Consistent with diabetes  Currently, no consensus exists for use of hemoglobin A1c  for diagnosis of diabetes for children.  This Hemoglobin A1c assay has significant interference with fetal   hemoglobin   (HbF). The results are invalid for patients with abnormal amounts of   HbF,   including those with known Hereditary Persistence   of Fetal Hemoglobin. Heterozygous hemoglobin variants (HbAS, HbAC,   HbAD, HbAE, HbA2) do not significantly interfere with this assay;   however, presence of multiple variants in a sample may impact the %   interference.     01/10/2018 >15.5 (H) 4.8 - 5.6 % Final     Comment:              Pre-diabetes: 5.7 - 6.4           Diabetes: >6.4           Glycemic control for adults with diabetes: <7.0     10/04/2017 10.8 (H) 4.0 - 5.6 % Final     Comment:     According to ADA guidelines, hemoglobin A1c <7.0% represents  optimal control in non-pregnant diabetic patients. Different  metrics may apply to specific patient populations.   Standards of Medical Care in Diabetes-2016.  For the purpose of screening for the presence of diabetes:  <5.7%     Consistent with the absence of diabetes  5.7-6.4%  Consistent with increasing risk for diabetes   (prediabetes)  >or=6.5%  Consistent with diabetes  Currently, no consensus exists for use of hemoglobin A1c  for diagnosis of diabetes for children.  This Hemoglobin A1c assay has significant interference with fetal   hemoglobin   (HbF). The results are invalid for patients with abnormal amounts  of   HbF,   including those with known Hereditary Persistence   of Fetal Hemoglobin. Heterozygous hemoglobin variants (HbAS, HbAC,   HbAD, HbAE, HbA2) do not significantly interfere with this assay;   however, presence of multiple variants in a sample may impact the %   interference.             Chemistry        Component Value Date/Time     05/21/2018 1200    K 4.5 05/21/2018 1200     05/21/2018 1200    CO2 24 05/21/2018 1200    BUN 17 05/21/2018 1200    CREATININE 1.1 05/21/2018 1200     (H) 05/21/2018 1200        Component Value Date/Time    CALCIUM 9.7 05/21/2018 1200    ALKPHOS 91 05/21/2018 1200    AST 27 05/21/2018 1200    ALT 49 (H) 05/21/2018 1200    BILITOT 0.5 05/21/2018 1200    ESTGFRAFRICA >60 05/21/2018 1200    EGFRNONAA >60 05/21/2018 1200          Lab Results   Component Value Date    LDLCALC 41 01/10/2018        Ref. Range 1/10/2018 08:21   Cholesterol Latest Ref Range: 100 - 199 mg/dL 141   HDL Latest Ref Range: >39 mg/dL 34 (L)   Triglycerides Latest Ref Range: 0 - 149 mg/dL 329 (H)   LDL Calculated Latest Ref Range: 0 - 99 mg/dL 41   VLDL   Cholesterol Say Latest Ref Range: 5 - 40 mg/dL 66 (H)       Lab Results   Component Value Date    MICALBCREAT 165.4 (H) 10/04/2017           STANDARDS of CARE:        ASA:               Last eye exam: 4/2018      ASSESSMENT and PLAN:    A1C GOAL: < 7 %       1. Uncontrolled type 2 diabetes mellitus with complication, with long-term current use of insulin  Highly suspect BGs are high in the evenings d/t missed or late bolus for a snack/meal.    Carry PDM and testing supplies with you when you leave the house.   Do not bolus for a meal later than 15-20 minutes after a meal.  Even if you are not able to test your blood sugar before a meal, please still bolus for the amount of carbs you are eating at that meal.     Test blood sugar before each meal and bedtime - 4x/day.   Ideally eat 3 meals/day - or at least 2  Meals/day with a snack in  between.   Please remember to bolus before each meal and snack.  Count all carbs and try not to rely on set carb meal settings. Encouraged him to use a carb counting leonard.     Return to clinic in 1 mo with labs prior.     POCT glucose    Hemoglobin A1c   2. Non morbid obesity, unspecified obesity type  Increases insulin resistance.   Improve diet.    3. NPDR (nonproliferative diabetic retinopathy)  Improve glycemic control.      4. Essential hypertension  Continue current tx    5. Microalbuminuria  Microalbumin/creatinine urine ratio       Orders Placed This Encounter   Procedures    Hemoglobin A1c     Standing Status:   Future     Standing Expiration Date:   9/8/2019    Microalbumin/creatinine urine ratio     Standing Status:   Future     Standing Expiration Date:   9/8/2019     Order Specific Question:   Specimen Source     Answer:   Urine    POCT glucose        Follow-up in about 4 weeks (around 8/7/2018).     Thank you very much for allowing me to participate in Cortes Schroeder's care.

## 2018-07-10 NOTE — PATIENT INSTRUCTIONS
"Please look into the Dietary Approach to Stopping Hypertension or the "DASH" diet - google this! If you want recipes, you can also search for these for free!    If time constraints are a big obstacle to healthy cooking/eating in your life, you may be interested in :     1) Healthy Course / Skinny Course - in [place]! Please look up on google.     2) Freshly - healthy meals that are usually low carb. Check the nutrition facts before you buy and select options with lower saturated fat (less than 4 grams if possible). Some options that qualify as low in saturated fat are: Angolan Chicken, Chicken Rice Pilaf, and Southwestern Chicken.     Nutrition Action Health Letter - by the [employer] for Science in the Public Interest. Make informed dietary decisions and obtain great recipes as well! Not funded by "the industry".     Read Food Labels - Serving Size, calories, fat and sugar.     My Fitness Pal - a free leonard that can help calorie counting    Weight Watchers - new product design is more user friendly and I love the support group facet to this intervention! They help teach you to make more informed decisions about what you eat.     Portion Control + More: The food pyramid has been replaced! Check out choosemyplate.gov - published by the NIH.         "

## 2018-07-10 NOTE — PATIENT INSTRUCTIONS
Carry PDM and testing supplies with you when you leave the house.   Do not bolus for a meal later than 15-20 minutes after a meal.  Even if you are not able to test your blood sugar before a meal, please still bolus for the amount of carbs you are eating at that meal.     Test blood sugar before each meal and bedtime - 4x/day.   Ideally eat 3 meals/day - or at least 2  Meals/day with a snack in between.   Please remember to bolus before each meal and snack.  Count all carbs and try not to rely on set carb meal settings.

## 2018-07-12 NOTE — PROGRESS NOTES
Physical Therapy Daily Note   Name: Cortes Schroeder  Clinic Number: [id number]    Diagnosis:   Encounter Diagnoses   Name Primary?    Chronic neck pain     Muscle weakness     Impaired motor control     Decreased range of motion of shoulder, unspecified laterality      Physician: Moe Dahl MD  Treatment Orders: PT Eval and Treat    Precautions: Diabetic, Impaired hearing   Visit #: 8 of 36  Total visit #: 9  Eval date: 5/10/2018  G-code reported: Initial Evaluation (Visit #1)   Certification period: 5/10/2018 - 8/2/2018 (PN Due 8/2/2018)    PATIENT NEEDS FOTO NEXT VISIT!     Subjective     Cortes reports: that his medications were changed they gave him a 100 mg tablet when the dose was supposed to be 10 mg. Patient indicates that the medication made him sleepy which seemed good because he was able to sleep through the pain.     Pain Scale:  3-4 out of 10 in his low back currently     Objective     Time In: 0730  Time Out: 0830  Total Treatment time: 60 minutes (1:1 with PT for 30 mins of treatment session)     Patient performed the following therapeutic exercises for 55 minutes in order to strengthen and stretch cervical and lumbar region:  Nustep: 8 minutes - Level 1.5   Pulleys: 6 minutes (flexion and abduction)   Chin tucks: 20 x 5 sec holds  Scapular retractions: 30x  Upper trap stretch: 3 x 20 seconds   Scalenes stretch: 3 x 20 seconds   Cervical isometrics (lateral and retraction): 20 x 3 sec holds c yellow TB   Supine chin tucks: 20 x 5 sec holds    Standing rows: 2 x 10 x Red TB  Standing shoulder ext: 2 x 10 x Red TB   Supine pec stretch on foam roll: 3 minutes   DKTC on SB with UE flexion: 3 x 10 (hands clasped)   +SKC on SB (Using SB for support of LE) 3 times 30 seconds  Pelvic tilts (isoflexion with red ball): 2 x 10 x 3 sec hold  Supine hamstring stretch: 4 x 20 sec hold ea.  Calf stretch on incline board: 4 x 20 sec hold  Supine Seated  sciatic nerve glides: 2 x 10 (LAQ with active ankle DF)  +Seated thoracic stretch c towel 10 times 5 sec hold   +Right SL for left QL stretch 2 minutes     Bed mobility (log roll) and postural correction x 5 minutes    Manual therapy performed on cervical region:  00 mins x suboccipital release, Steven upper trap stretch, cervical distraction  Lumbar pelvic distraction 8 minutes    Mechanical cervical traction performed for 8 min increments of 14 pound static holds     Moist heat pack was placed on cervical region for 00 minutes to reduce tension and stiffness in cervical region.     Written Home Exercises Provided: Patient educated to continue with previously issued HEP in order to complement therapy sessions.   Exercises were reviewed and Cortes was able to demonstrate them prior to the end of the session. Patient received a written copy of exercises to perform at home. Cortes demonstrated good  understanding of the education provided.     Cervical Range of Motion:     Degrees Pain   Flexion 40        Extension 35 Present into midback      Right Side Bending 30        Left Side Bending 27        Right Rotation 40 Present   Left Rotation 45        Shoulder Range of Motion:   Shoulder Left Right   Flexion 150 155   Abduction 145 90     Flexibility:        Right Left   Ely's Test  Positive Positive    Supine 90/90 40 30       Assessment     Patient tolerated treatment session well today. Patient continues to benefit from traction of lumbar and cervical region. Patient demonstrated some discomfort with seated thoracic stretch but following the repetitive motion, there was a reduction in his pain symptoms. Patient will continue to benefit from skilled PT services in order to address limitations present in problem list and education on proper advancement of HEP.     Patient was reassessed during today's treatment session and presents with improvements in cervical range of motion but still presents with pain. Patient  still presents with a level of pain and discomfort that is relieved with some stretching. Patient continues to presents with poor posture that increase with presence of headaches. Patients requires cueing to reduce the presence of these poor postures but able to demonstrate knowledge of the proper positions. Patient has benefited from manual and mechanical tractions of cervical and lumbar region. Patient will engage in more stretches and strengthening of the LE to improve pain symptoms and reduce discomfort. Patient still demonstrates muscular tension that will continue to be addressed.     Pt's spiritual, cultural and educational needs considered and pt agreeable to plan of care and goals.    Prognosis: Good     Anticipated barriers to physical therapy: none    Medical necessity is demonstrated by the following IMPAIRMENTS/PROBLEM LIST:   1) Increase in pain level limiting function   2) Decreased cervical strength     3) Decreased cervical ROM    4) Muscle weakness    5) Poor posture    6) Lack of HEP    GOALS: Short Term Goals:  6 weeks  1. Patient will report a decrease in cervical pain  <   / =  3/10 to increase tolerance for daily activities. - In progress 80%  2. Patient will be able to increase 20 degrees of shoulder ROM on right to improve tolerance to daily activities.  - MET (7/13/2018)  3. Patient will be able to demonstrate an increase of 5  degrees of pain free motion in cervical region to improve mobility. - MET (7/13/2018)   4. Patient will be able to tolerate HEP to improve ROM and independence with ADL's. - MET (6/11/2018)  5. Patient will be able to demonstrate proper resting and activity posture to improve shoulder mechanics with activity. - MET (6/11/2018) inconsistently     Long Term Goals: 12 weeks  1. Patient will report a decrease in shoulder pain  <   / =  2/10 at worse to increase tolerance for walking and ADL activities.  2. Patient will be able to increase MMT to 4+/5 in right shoulder ER  to increase tolerance for work and golfing activities.   3. Patient will be able to increase MMT to 5/5 in cervical region to increase tolerance for work and golfing activities.  4. Patient will be able to demonstrate an increase of 30 degrees of pain free motion in cervical region to improve mobility and ADLs    5. Patient will be Independent with HEP to improve ROM and independence with ADL's    Plan     Continue with established Plan of Care towards PT goals.     Certification period: 5/10/2018 -8/2/2018 (PN Due 8/02/2018)    Alyson Kearns, PT  7/13/2018

## 2018-07-13 ENCOUNTER — CLINICAL SUPPORT (OUTPATIENT)
Dept: REHABILITATION | Facility: HOSPITAL | Age: 58
End: 2018-07-13
Attending: NEUROLOGICAL SURGERY
Payer: MEDICAID

## 2018-07-13 DIAGNOSIS — M54.2 CHRONIC NECK PAIN: ICD-10-CM

## 2018-07-13 DIAGNOSIS — Z78.9 IMPAIRED MOTOR CONTROL: ICD-10-CM

## 2018-07-13 DIAGNOSIS — M62.81 MUSCLE WEAKNESS: ICD-10-CM

## 2018-07-13 DIAGNOSIS — M25.619 DECREASED RANGE OF MOTION OF SHOULDER, UNSPECIFIED LATERALITY: ICD-10-CM

## 2018-07-13 DIAGNOSIS — G89.29 CHRONIC NECK PAIN: ICD-10-CM

## 2018-07-13 PROCEDURE — 97110 THERAPEUTIC EXERCISES: CPT | Mod: PN

## 2018-07-13 RX ORDER — INSULIN LISPRO 100 [IU]/ML
INJECTION, SOLUTION INTRAVENOUS; SUBCUTANEOUS
Qty: 30 ML | Refills: 0 | Status: SHIPPED | OUTPATIENT
Start: 2018-07-13 | End: 2018-08-15 | Stop reason: SDUPTHER

## 2018-07-16 ENCOUNTER — CLINICAL SUPPORT (OUTPATIENT)
Dept: REHABILITATION | Facility: HOSPITAL | Age: 58
End: 2018-07-16
Attending: NEUROLOGICAL SURGERY
Payer: MEDICAID

## 2018-07-16 ENCOUNTER — OFFICE VISIT (OUTPATIENT)
Dept: FAMILY MEDICINE | Facility: CLINIC | Age: 58
End: 2018-07-16
Payer: MEDICAID

## 2018-07-16 ENCOUNTER — LAB VISIT (OUTPATIENT)
Dept: LAB | Facility: HOSPITAL | Age: 58
End: 2018-07-16
Attending: FAMILY MEDICINE
Payer: MEDICAID

## 2018-07-16 VITALS
SYSTOLIC BLOOD PRESSURE: 110 MMHG | HEART RATE: 88 BPM | TEMPERATURE: 98 F | BODY MASS INDEX: 33.13 KG/M2 | RESPIRATION RATE: 12 BRPM | WEIGHT: 198.88 LBS | OXYGEN SATURATION: 96 % | HEIGHT: 65 IN | DIASTOLIC BLOOD PRESSURE: 72 MMHG

## 2018-07-16 DIAGNOSIS — Z78.9 IMPAIRED MOTOR CONTROL: ICD-10-CM

## 2018-07-16 DIAGNOSIS — G89.29 CHRONIC NECK PAIN: ICD-10-CM

## 2018-07-16 DIAGNOSIS — M54.2 CHRONIC NECK PAIN: ICD-10-CM

## 2018-07-16 DIAGNOSIS — M25.619 DECREASED RANGE OF MOTION OF SHOULDER, UNSPECIFIED LATERALITY: ICD-10-CM

## 2018-07-16 DIAGNOSIS — M62.81 MUSCLE WEAKNESS: ICD-10-CM

## 2018-07-16 DIAGNOSIS — Z20.6 EXPOSURE TO HIV: ICD-10-CM

## 2018-07-16 LAB
ALBUMIN SERPL BCP-MCNC: 3.7 G/DL
ALP SERPL-CCNC: 91 U/L
ALT SERPL W/O P-5'-P-CCNC: 33 U/L
ANION GAP SERPL CALC-SCNC: 7 MMOL/L
AST SERPL-CCNC: 20 U/L
BILIRUB SERPL-MCNC: 0.3 MG/DL
BUN SERPL-MCNC: 14 MG/DL
CALCIUM SERPL-MCNC: 9.6 MG/DL
CHLORIDE SERPL-SCNC: 104 MMOL/L
CO2 SERPL-SCNC: 27 MMOL/L
CREAT SERPL-MCNC: 0.9 MG/DL
EST. GFR  (AFRICAN AMERICAN): >60 ML/MIN/1.73 M^2
EST. GFR  (NON AFRICAN AMERICAN): >60 ML/MIN/1.73 M^2
GLUCOSE SERPL-MCNC: 114 MG/DL
POTASSIUM SERPL-SCNC: 4.6 MMOL/L
PROT SERPL-MCNC: 6.8 G/DL
SODIUM SERPL-SCNC: 138 MMOL/L

## 2018-07-16 PROCEDURE — 99213 OFFICE O/P EST LOW 20 MIN: CPT | Mod: PBBFAC,PN | Performed by: FAMILY MEDICINE

## 2018-07-16 PROCEDURE — 99999 PR PBB SHADOW E&M-EST. PATIENT-LVL III: CPT | Mod: PBBFAC,,, | Performed by: FAMILY MEDICINE

## 2018-07-16 PROCEDURE — 80053 COMPREHEN METABOLIC PANEL: CPT

## 2018-07-16 PROCEDURE — 99214 OFFICE O/P EST MOD 30 MIN: CPT | Mod: S$PBB,,, | Performed by: FAMILY MEDICINE

## 2018-07-16 PROCEDURE — 36415 COLL VENOUS BLD VENIPUNCTURE: CPT | Mod: PN

## 2018-07-16 PROCEDURE — 97140 MANUAL THERAPY 1/> REGIONS: CPT | Mod: PN

## 2018-07-16 PROCEDURE — 86703 HIV-1/HIV-2 1 RESULT ANTBDY: CPT

## 2018-07-16 PROCEDURE — 97110 THERAPEUTIC EXERCISES: CPT | Mod: PN

## 2018-07-16 RX ORDER — EMTRICITABINE AND TENOFOVIR DISOPROXIL FUMARATE 200; 300 MG/1; MG/1
1 TABLET, FILM COATED ORAL DAILY
Qty: 30 TABLET | Refills: 2 | Status: SHIPPED | OUTPATIENT
Start: 2018-07-16 | End: 2018-08-15 | Stop reason: SDUPTHER

## 2018-07-16 RX ORDER — AMITRIPTYLINE HYDROCHLORIDE 50 MG/1
TABLET, FILM COATED ORAL
Refills: 3 | COMMUNITY
Start: 2018-07-10 | End: 2018-10-15

## 2018-07-16 NOTE — PROGRESS NOTES
Routine Office Visit    Patient Name: Cortes Schroeder    : 1960  MRN: [id number]    Subjective:  Cortes is a 58 y.o. male who presents today for:    1. PrEP  Patient presenting today for follow up of PrEP.  He has been taking medication as prescribed.  He has not had any side effects related to the medication.  He initially thought that his depression had worsened due to Truvada (which is a side effect), but no changes with adjusting timing of medication.  He states that he was feeling down due to be overwhelmed.  He is not currently sexually active.  When he is, its the same partner.  They occasionally use condoms.  He states that blood sugars have been doing better.   No rashes, penile discharge, night sweats, or unexplained weight loss    Past Medical History  Past Medical History:   Diagnosis Date    Anxiety 2012    Back pain 2012    Cataract     Chronic pain syndrome 2016    Diabetes type 2, controlled 2016    Diabetic retinopathy     DM (diabetes mellitus) 2012    DM (diabetes mellitus), type 2, uncontrolled 2013    Essential hypertension 2016    Gastroesophageal reflux disease without esophagitis 2016    HIV (human immunodeficiency virus infection)     Hyperlipidemia 2012    Insomnia 2014    Neuropathy 2013       Past Surgical History  Past Surgical History:   Procedure Laterality Date    BACK SURGERY      Lumbar Spine       Family History  Family History   Problem Relation Age of Onset    Cataracts Father     Hypertension Father     Parkinsonism Father     Alzheimer's disease Father     Migraines Mother     Hypertension Mother     Heart attack Mother     Amblyopia Neg Hx     Blindness Neg Hx     Glaucoma Neg Hx     Macular degeneration Neg Hx     Retinal detachment Neg Hx     Strabismus Neg Hx        Social History  Social History     Social History    Marital status:      Spouse name: N/A    Number of  children: N/A    Years of education: N/A     Occupational History    Not on file.     Social History Main Topics    Smoking status: Never Smoker    Smokeless tobacco: Never Used    Alcohol use 0.6 oz/week     1 Glasses of wine per week      Comment: occasionally    Drug use: No    Sexual activity: Not Currently     Partners: Male     Birth control/ protection: Condom      Comment: 10/2/17      Other Topics Concern    Not on file     Social History Narrative    No narrative on file       Current Medications  Current Outpatient Prescriptions on File Prior to Visit   Medication Sig Dispense Refill    aspirin (ECOTRIN) 81 MG EC tablet Take 1 tablet (81 mg total) by mouth once daily.  0    blood sugar diagnostic (FREESTYLE INSULINX TEST STRIPS) Strp 1 strip by Misc.(Non-Drug; Combo Route) route 4 (four) times daily before meals and nightly. 200 strip 8    clotrimazole-betamethasone 1-0.05% (LOTRISONE) cream Apply topically 2 (two) times daily. 15 g 5    cyanocobalamin (VITAMIN B-12) 100 MCG tablet Take 100 mcg by mouth once daily.      DULoxetine (CYMBALTA) 60 MG capsule Take 1 capsule (60 mg total) by mouth once daily. 30 capsule 3    fish oil-omega-3 fatty acids 300-1,000 mg capsule Take by mouth 2 (two) times daily.      gabapentin (NEURONTIN) 100 MG capsule TAKE 1 TO 2 CAPSULES(100  MG) BY MOUTH EVERY EVENING 60 capsule 11    HUMALOG U-100 INSULIN 100 unit/mL injection  UNITS PER DAY WITH AUTOMATED INSULIN PUMP AS DIRECTED BY ENDOCRINOLOGY 30 mL 0    losartan (COZAAR) 50 MG tablet Take 1 tablet (50 mg total) by mouth once daily. 90 tablet 3    magnesium oxide-Mg AA chelate (MG-PLUS-PROTEIN) 133 mg Tab Take 500 mg by mouth every evening.       metFORMIN (GLUCOPHAGE) 1000 MG tablet TAKE 1 TABLET(1000 MG) BY MOUTH TWICE DAILY WITH MEALS 180 tablet 0    pravastatin (PRAVACHOL) 80 MG tablet Take 1 tablet (80 mg total) by mouth once daily. 90 tablet 3    PROMETHAZINE/DEXTROMETHORPHAN  "(PROMETHAZINE-DM ORAL) Take by mouth as needed.      PROPYLENE GLYCOL//PF (SYSTANE, PF, OPHT) Apply to eye 4 (four) times daily.      SITagliptin (JANUVIA) 100 MG Tab Take 1 tablet (100 mg total) by mouth once daily. 90 tablet 3    subcutaneous insulin pump (OMNIPOD INSULIN MANAGEMENT MISC) by Misc.(Non-Drug; Combo Route) route.      TRUE METRIX GLUCOSE TEST STRIP Strp USE TID  0    vitamin D 1000 units Tab Take 185 mg by mouth 2 (two) times daily.       [DISCONTINUED] amitriptyline (ELAVIL) 100 MG tablet Take 100 mg by mouth every evening.      [DISCONTINUED] emtricitabine-tenofovir 200-300 mg (TRUVADA) 200-300 mg Tab Take 1 tablet by mouth once daily. 30 tablet 0    [DISCONTINUED] syringe, disposable, 1 mL Syrg Use as directed 100 Syringe 11     No current facility-administered medications on file prior to visit.        Allergies   Review of patient's allergies indicates:   Allergen Reactions    Invokana [canagliflozin] Anaphylaxis    Percocet [oxycodone-acetaminophen] Nausea Only and Hallucinations    Biaxin [clarithromycin]     Hydrocodone Other (See Comments)     Dizzy/nausea/hallucinations    Sulfa (sulfonamide antibiotics) Nausea Only and Rash       Review of Systems (Pertinent positives)  Review of Systems   HENT: Positive for hearing loss.    Eyes: Negative for discharge.   Respiratory: Negative for wheezing.    Cardiovascular: Negative for chest pain and palpitations.   Gastrointestinal: Negative for blood in stool, constipation, diarrhea and vomiting.   Genitourinary: Negative for hematuria and urgency.   Musculoskeletal: Positive for joint pain and neck pain.   Neurological: Negative for weakness and headaches.   Endo/Heme/Allergies: Negative for polydipsia.         /72 (BP Location: Right arm, Patient Position: Sitting, BP Method: Medium (Manual))   Pulse 88   Temp 97.7 °F (36.5 °C) (Oral)   Resp 12   Ht 5' 5" (1.651 m)   Wt 90.2 kg (198 lb 13.7 oz)   SpO2 96%   BMI " 33.09 kg/m²     GENERAL APPEARANCE: in no apparent distress and well developed and well nourished  HEENT: PERRL, EOMI, Sclera clear, anicteric, Oropharynx clear, no lesions, Neck supple with midline trachea  NECK: normal, supple, no adenopathy, thyroid normal in size  RESPIRATORY: appears well, vitals normal, no respiratory distress, acyanotic, normal RR, chest clear, no wheezing, crepitations, rhonchi, normal symmetric air entry  HEART: regular rate and rhythm, S1, S2 normal, no murmur, click, rub or gallop.    ABDOMEN: abdomen is soft without tenderness, no masses, no hernias, no organomegaly, no rebound, no guarding. Suprapubic tenderness absent. No CVA tenderness.  SKIN: no rashes, no wounds, no other lesions  PSYCH: Alert, oriented x 3, thought content appropriate, speech normal, pleasant and cooperative, good eye contact, well groomed    Assessment/Plan:  Cortes Schroeder is a 58 y.o. male who presents today for :    Cortes was seen today for follow-up.    Diagnoses and all orders for this visit:    Exposure to HIV  -     emtricitabine-tenofovir 200-300 mg (TRUVADA) 200-300 mg Tab; Take 1 tablet by mouth once daily.  -     HIV-1 and HIV-2 antibodies; Future  -     Comprehensive metabolic panel; Future      1.  Truvada refilled  2.  Labs to be done today to evaluate HIV status and renal function  3.  Follow up 3 months or sooner if needed    Don Artis MD

## 2018-07-16 NOTE — PROGRESS NOTES
Physical Therapy Daily Note   Name: Cortes Schroeder  Clinic Number: [id number]    Diagnosis:   Encounter Diagnoses   Name Primary?    Chronic neck pain     Muscle weakness     Impaired motor control     Decreased range of motion of shoulder, unspecified laterality      Physician: Moe Dahl MD  Treatment Orders: PT Eval and Treat    Precautions: Diabetic, Impaired hearing   Visit #: 9 of 36  Total visit #: 10  Eval date: 5/10/2018  G-code reported: Initial Evaluation (Visit #1)   Certification period: 5/10/2018 - 8/2/2018 (PN Due 8/2/2018)    PATIENT NEEDS FOTO NEXT VISIT!     Subjective     Cortes reports: that he felt a good this morning and it almost felt weird.     Pain Scale:  2 out of 10 in his low back currently     Objective     Time In: 0930  Time Out: 1030  Total Treatment time: 60 minutes (1:1 with PT for 40 mins of treatment session)     Patient performed the following therapeutic exercises for 55 minutes in order to strengthen and stretch cervical and lumbar region:  Nustep: 8 minutes - Level 1.5   Pulleys: 6 minutes (flexion and abduction)   Chin tucks: 20 x 5 sec holds  Upper trap stretch: 3 x 20 seconds   Scalenes stretch: 3 x 20 seconds   Cervical isometrics (lateral and retraction): 20 x 3 sec holds c yellow TB   Supine chin tucks: 20 x 5 sec holds    Standing rows: 2 x 10 x Red TB   DKTC on SB with UE flexion: 3 x 10 (hands clasped)   +SKC on SB (Using SB for support of LE) 3 times 30 seconds  Pelvic tilts (isoflexion with red ball): 2 x 10 x 3 sec hold  Supine hamstring stretch: 4 x 20 sec hold ea.  Calf stretch on incline board: 4 x 20 sec hold  Supine Seated sciatic nerve glides: 2 x 10 (LAQ with active ankle DF)  +Seated thoracic stretch c towel 10 times 5 sec hold   +Right SL for left QL stretch 2 minutes   Scapular retractions: 30x  Standing shoulder ext: 2 x 10 x Red TB   Supine pec stretch on foam roll: 3 minutes     Bed mobility  (log roll) and postural correction x 5 minutes    Manual therapy performed on cervical region:  00 mins x suboccipital release, Steven upper trap stretch, cervical distraction  Lumbar pelvic distraction 8 minutes    Mechanical cervical traction performed for 8 min increments of 14 pound static holds     Moist heat pack was placed on cervical region for 00 minutes to reduce tension and stiffness in cervical region.     Written Home Exercises Provided: Patient educated to continue with previously issued HEP in order to complement therapy sessions.   Exercises were reviewed and Cortes was able to demonstrate them prior to the end of the session. Patient received a written copy of exercises to perform at home. Cortes demonstrated good  understanding of the education provided.     Assessment     Patient tolerated treatment session well today. Patient continues to benefit from traction of lumbar and cervical region.  Patient had good response with double knee to chest with overhead stretch. Patient benefits from further stretching and had a reduction in pain symptoms at the start of today's therapy session. Patient demonstrated some discomfort with seated thoracic stretch but following the repetitive motion, there was a reduction in his pain symptoms. Patient will continue to benefit from skilled PT services in order to address limitations present in problem list and education on proper advancement of HEP.     Pt's spiritual, cultural and educational needs considered and pt agreeable to plan of care and goals.    Prognosis: Good     Anticipated barriers to physical therapy: none    Medical necessity is demonstrated by the following IMPAIRMENTS/PROBLEM LIST:   1) Increase in pain level limiting function   2) Decreased cervical strength     3) Decreased cervical ROM    4) Muscle weakness    5) Poor posture    6) Lack of HEP    GOALS: Short Term Goals:  6 weeks  1. Patient will report a decrease in cervical pain  <   / =   3/10 to increase tolerance for daily activities. - In progress 80%  2. Patient will be able to increase 20 degrees of shoulder ROM on right to improve tolerance to daily activities.  - MET (7/13/2018)  3. Patient will be able to demonstrate an increase of 5  degrees of pain free motion in cervical region to improve mobility. - MET (7/13/2018)   4. Patient will be able to tolerate HEP to improve ROM and independence with ADL's. - MET (6/11/2018)  5. Patient will be able to demonstrate proper resting and activity posture to improve shoulder mechanics with activity. - MET (6/11/2018) inconsistently     Long Term Goals: 12 weeks  1. Patient will report a decrease in shoulder pain  <   / =  2/10 at worse to increase tolerance for walking and ADL activities.  2. Patient will be able to increase MMT to 4+/5 in right shoulder ER to increase tolerance for work and golfing activities.   3. Patient will be able to increase MMT to 5/5 in cervical region to increase tolerance for work and golfing activities.  4. Patient will be able to demonstrate an increase of 30 degrees of pain free motion in cervical region to improve mobility and ADLs    5. Patient will be Independent with HEP to improve ROM and independence with ADL's    Plan     Continue with established Plan of Care towards PT goals.     Certification period: 5/10/2018 -8/2/2018 (PN Due 8/02/2018)    Alyson Kearns, PT  7/16/2018

## 2018-07-17 LAB — HIV 1+2 AB+HIV1 P24 AG SERPL QL IA: NEGATIVE

## 2018-07-23 ENCOUNTER — CLINICAL SUPPORT (OUTPATIENT)
Dept: REHABILITATION | Facility: HOSPITAL | Age: 58
End: 2018-07-23
Attending: NEUROLOGICAL SURGERY
Payer: MEDICAID

## 2018-07-23 DIAGNOSIS — G89.29 CHRONIC NECK PAIN: ICD-10-CM

## 2018-07-23 DIAGNOSIS — M62.81 MUSCLE WEAKNESS: ICD-10-CM

## 2018-07-23 DIAGNOSIS — M54.2 CHRONIC NECK PAIN: ICD-10-CM

## 2018-07-23 DIAGNOSIS — M25.619 DECREASED RANGE OF MOTION OF SHOULDER, UNSPECIFIED LATERALITY: ICD-10-CM

## 2018-07-23 DIAGNOSIS — Z78.9 IMPAIRED MOTOR CONTROL: ICD-10-CM

## 2018-07-23 PROCEDURE — 97110 THERAPEUTIC EXERCISES: CPT | Mod: PN

## 2018-07-23 NOTE — PROGRESS NOTES
Physical Therapy Daily Note   Name: Cortes Schroeder  Clinic Number: [id number]    Diagnosis:   Encounter Diagnoses   Name Primary?    Chronic neck pain     Muscle weakness     Impaired motor control     Decreased range of motion of shoulder, unspecified laterality      Physician: Moe Dahl MD  Treatment Orders: PT Eval and Treat    Precautions: Diabetic, Impaired hearing   Visit #: 10 of 36  Total visit #: 11  Eval date: 5/10/2018  G-code reported: Initial Evaluation (Visit #1)   Certification period: 5/10/2018 - 8/2/2018 (PN Due 8/2/2018)    PATIENT NEEDS FOTO NEXT VISIT!     Subjective     Cortes reports: that over the past week he has had some bad days. Patient indicates today he feels pretty good and indicates feeling better following treatment session. Patient indicates he is going out of town tomorrow and will determine if continuation is needed after follow up with MD.     Pain Scale:  2 out of 10 in his low back currently     Objective     Time In: 0850  Time Out: 1000  Total Treatment time: 70 minutes (1:1 with PT for duration mins of treatment session)     Patient performed the following therapeutic exercises for 55 minutes in order to strengthen and stretch cervical and lumbar region:  Nustep: 8 minutes - Level 1.5   Pulleys: 6 minutes (flexion and abduction)   Chin tucks: 20 x 5 sec holds  Upper trap stretch: 3 x 20 seconds   Scalenes stretch: 3 x 20 seconds   Cervical isometrics (lateral and retraction): 20 x 3 sec holds c yellow TB   Supine chin tucks: 20 x 5 sec holds    Standing rows: 2 x 10 x Red TB   DKTC on SB with UE flexion: 3 x 10 (hands clasped)   SKC on SB (Using SB for support of LE) 3 times 30 seconds  Pelvic tilts (isoflexion with red ball): 2 x 10 x 3 sec hold  Supine hamstring stretch: 4 x 20 sec hold ea.  Calf stretch on incline board: 4 x 20 sec hold  Supine Seated sciatic nerve glides: 2 x 10 (LAQ with active ankle  DF)  Seated thoracic stretch c towel 10 times 5 sec hold   Right SL for left QL stretch 2 minutes   +Bridges 3 sets of 10 reps  +HL hip abduction c green TB 2 x 10   +Sit to stands from standard height chair 10 reps     Scapular retractions: 30x  Standing shoulder ext: 2 x 10 x Red TB   Supine pec stretch on foam roll: 3 minutes     Bed mobility (log roll) and postural correction x 5 minutes    Manual therapy performed on cervical region:  00 mins x suboccipital release, Steven upper trap stretch, cervical distraction  Lumbar pelvic distraction 8 minutes    Mechanical cervical traction performed for 8 min increments of 14 pound static holds     Moist heat pack was placed on cervical region for 00 minutes to reduce tension and stiffness in cervical region.     Written Home Exercises Provided: Patient educated to continue with previously issued HEP in order to complement therapy sessions.   Exercises were reviewed and Cortes was able to demonstrate them prior to the end of the session. Patient received a written copy of exercises to perform at home. Cortes demonstrated good  understanding of the education provided.     Assessment     Patient tolerated treatment session well today. Patient continues to benefit from traction of lumbar and cervical region. Patient had good response to therapy during today's treatment with increase in strengthening activities. Patient indicates problems of getting in and out of chair and was able to benefit from sit to stands during today's treatment session. Patient indicates he is going out of town tomorrow morning and will see what the MD suggest following imaging. Patient will continue to benefit from skilled PT services in order to address limitations present in problem list and education on proper advancement of HEP.     Pt's spiritual, cultural and educational needs considered and pt agreeable to plan of care and goals.    Prognosis: Good     Anticipated barriers to physical  therapy: none    Medical necessity is demonstrated by the following IMPAIRMENTS/PROBLEM LIST:   1) Increase in pain level limiting function   2) Decreased cervical strength     3) Decreased cervical ROM    4) Muscle weakness    5) Poor posture    6) Lack of HEP    GOALS: Short Term Goals:  6 weeks  1. Patient will report a decrease in cervical pain  <   / =  3/10 to increase tolerance for daily activities. - In progress 80%  2. Patient will be able to increase 20 degrees of shoulder ROM on right to improve tolerance to daily activities.  - MET (7/13/2018)  3. Patient will be able to demonstrate an increase of 5  degrees of pain free motion in cervical region to improve mobility. - MET (7/13/2018)   4. Patient will be able to tolerate HEP to improve ROM and independence with ADL's. - MET (6/11/2018)  5. Patient will be able to demonstrate proper resting and activity posture to improve shoulder mechanics with activity. - MET (6/11/2018) inconsistently     Long Term Goals: 12 weeks  1. Patient will report a decrease in shoulder pain  <   / =  2/10 at worse to increase tolerance for walking and ADL activities.  2. Patient will be able to increase MMT to 4+/5 in right shoulder ER to increase tolerance for work and golfing activities.   3. Patient will be able to increase MMT to 5/5 in cervical region to increase tolerance for work and golfing activities.  4. Patient will be able to demonstrate an increase of 30 degrees of pain free motion in cervical region to improve mobility and ADLs    5. Patient will be Independent with HEP to improve ROM and independence with ADL's    Plan     Continue with established Plan of Care towards PT goals.     Certification period: 5/10/2018 -8/2/2018 (PN Due 8/02/2018)    Alyson Kearns, PT  7/23/2018

## 2018-08-02 ENCOUNTER — HOSPITAL ENCOUNTER (OUTPATIENT)
Dept: RADIOLOGY | Facility: HOSPITAL | Age: 58
Discharge: HOME | End: 2018-08-02
Attending: NEUROLOGICAL SURGERY
Payer: MEDICAID

## 2018-08-02 DIAGNOSIS — M54.17 LUMBOSACRAL RADICULOPATHY: ICD-10-CM

## 2018-08-02 DIAGNOSIS — M54.2 CERVICALGIA: ICD-10-CM

## 2018-08-02 DIAGNOSIS — G95.9 CERVICAL MYELOPATHY: ICD-10-CM

## 2018-08-02 DIAGNOSIS — Z98.1 HISTORY OF LUMBAR FUSION: ICD-10-CM

## 2018-08-02 PROCEDURE — 72114 X-RAY EXAM L-S SPINE BENDING: CPT | Mod: 26,,, | Performed by: RADIOLOGY

## 2018-08-02 PROCEDURE — 72050 X-RAY EXAM NECK SPINE 4/5VWS: CPT | Mod: TC,FY

## 2018-08-02 PROCEDURE — 72114 X-RAY EXAM L-S SPINE BENDING: CPT | Mod: TC,FY

## 2018-08-02 PROCEDURE — 72050 X-RAY EXAM NECK SPINE 4/5VWS: CPT | Mod: 26,,, | Performed by: RADIOLOGY

## 2018-08-08 ENCOUNTER — TELEPHONE (OUTPATIENT)
Dept: SPINE | Facility: CLINIC | Age: 58
End: 2018-08-08

## 2018-08-08 ENCOUNTER — LAB VISIT (OUTPATIENT)
Dept: LAB | Facility: HOSPITAL | Age: 58
End: 2018-08-08
Attending: NURSE PRACTITIONER
Payer: MEDICAID

## 2018-08-08 LAB
ESTIMATED AVG GLUCOSE: 223 MG/DL
HBA1C MFR BLD HPLC: 9.4 %

## 2018-08-08 PROCEDURE — 83036 HEMOGLOBIN GLYCOSYLATED A1C: CPT

## 2018-08-08 PROCEDURE — 36415 COLL VENOUS BLD VENIPUNCTURE: CPT

## 2018-08-08 NOTE — TELEPHONE ENCOUNTER
Spoke with pt. Scheduled imaging before f/u. Pt verbalized understanding.     ----- Message from Lise Servin PA-C sent at 8/8/2018  1:00 PM CDT -----  Peer to peer done on this patient & approved for MRI Lspine.  Expiration is Sept 25.  Approval # [id number].  Can you please schedule the MRI & follow up ?  Thanks, Lise

## 2018-08-10 ENCOUNTER — OFFICE VISIT (OUTPATIENT)
Dept: OPTOMETRY | Facility: CLINIC | Age: 58
End: 2018-08-10
Payer: MEDICAID

## 2018-08-10 DIAGNOSIS — E11.3292 MILD NONPROLIFERATIVE DIABETIC RETINOPATHY OF LEFT EYE WITHOUT MACULAR EDEMA ASSOCIATED WITH TYPE 2 DIABETES MELLITUS: Primary | ICD-10-CM

## 2018-08-10 DIAGNOSIS — H04.121 DRY EYE SYNDROME, RIGHT: ICD-10-CM

## 2018-08-10 DIAGNOSIS — H52.03 HYPEROPIA WITH PRESBYOPIA, BILATERAL: ICD-10-CM

## 2018-08-10 DIAGNOSIS — H25.13 NUCLEAR SCLEROSIS, BILATERAL: ICD-10-CM

## 2018-08-10 DIAGNOSIS — H52.4 HYPEROPIA WITH PRESBYOPIA, BILATERAL: ICD-10-CM

## 2018-08-10 PROCEDURE — 92014 COMPRE OPH EXAM EST PT 1/>: CPT | Mod: S$PBB,,, | Performed by: OPTOMETRIST

## 2018-08-10 PROCEDURE — 99999 PR PBB SHADOW E&M-EST. PATIENT-LVL II: CPT | Mod: PBBFAC,,, | Performed by: OPTOMETRIST

## 2018-08-10 PROCEDURE — 99212 OFFICE O/P EST SF 10 MIN: CPT | Mod: PBBFAC,PO | Performed by: OPTOMETRIST

## 2018-08-10 NOTE — PROGRESS NOTES
HPI     DLS 3/27/18  Pt. States he is starting to have problems seeing distance and near.   Closes right eye most of the time  Occasionally gets the stabbing pain od last for about 5 minutes. Headache   right forehead. Seeing neuro next week  BS uncontrolled  Floaters ou for years no flashes  Eyes water. Occasional itching  Using AT about 3 time a day  Wishes new spex    Last edited by Pollo Mercedes, OD on 8/10/2018  3:49 PM. (History)        ROS     Positive for: Endocrine (DM)    Negative for: Constitutional, Gastrointestinal, Neurological, Skin,   Genitourinary, Musculoskeletal, HENT, Cardiovascular, Respiratory,   Psychiatric, Allergic/Imm, Heme/Lymph    Last edited by Pollo Mercedes, OD on 8/10/2018  3:49 PM. (History)        Assessment /Plan     For exam results, see Encounter Report.    Mild nonproliferative diabetic retinopathy of left eye without macular edema associated with type 2 diabetes mellitus    Nuclear sclerosis, bilateral    Dry eye syndrome, right    Hyperopia with presbyopia, bilateral      1. Cats OD>OS--wrote new spex Rx.  If [facility] not sufficient may be ready for cat eval  2. DM w Mild NPDR OS--stable  3. ELLIS--advised SYS BAL or SOOTHE XP Ats QID/prn    PLAN:    rtc 6 months--DFE

## 2018-08-13 ENCOUNTER — HOSPITAL ENCOUNTER (OUTPATIENT)
Dept: RADIOLOGY | Facility: OTHER | Age: 58
Discharge: HOME | End: 2018-08-13
Attending: NEUROLOGICAL SURGERY
Payer: MEDICAID

## 2018-08-13 ENCOUNTER — OFFICE VISIT (OUTPATIENT)
Dept: SPINE | Facility: CLINIC | Age: 58
End: 2018-08-13
Payer: MEDICAID

## 2018-08-13 VITALS
SYSTOLIC BLOOD PRESSURE: 142 MMHG | HEART RATE: 91 BPM | BODY MASS INDEX: 33.13 KG/M2 | HEIGHT: 65 IN | DIASTOLIC BLOOD PRESSURE: 83 MMHG | WEIGHT: 198.88 LBS

## 2018-08-13 DIAGNOSIS — Z98.1 HISTORY OF LUMBAR FUSION: ICD-10-CM

## 2018-08-13 DIAGNOSIS — R29.818 NEUROGENIC CLAUDICATION: Primary | ICD-10-CM

## 2018-08-13 DIAGNOSIS — M54.17 LUMBOSACRAL RADICULOPATHY: ICD-10-CM

## 2018-08-13 PROCEDURE — 72148 MRI LUMBAR SPINE W/O DYE: CPT | Mod: 26,,, | Performed by: RADIOLOGY

## 2018-08-13 PROCEDURE — 99999 PR PBB SHADOW E&M-EST. PATIENT-LVL III: CPT | Mod: PBBFAC,,, | Performed by: NEUROLOGICAL SURGERY

## 2018-08-13 PROCEDURE — 99214 OFFICE O/P EST MOD 30 MIN: CPT | Mod: S$PBB,,, | Performed by: NEUROLOGICAL SURGERY

## 2018-08-13 PROCEDURE — 99213 OFFICE O/P EST LOW 20 MIN: CPT | Mod: PBBFAC,25 | Performed by: NEUROLOGICAL SURGERY

## 2018-08-13 PROCEDURE — 72148 MRI LUMBAR SPINE W/O DYE: CPT | Mod: TC

## 2018-08-13 NOTE — PROGRESS NOTES
CHIEF COMPLAINT:  Follow up after imaging and physical therapy    I, Nash Jimenez, attest that this documentation has been prepared under the direction and in the presence of Moe Dahl MD.    HPI:  Cortes Schroeder is a 58 y.o.  male with history of diabetes type 2 and CVA on aspirin 81 mg, who presents today for follow up evaluation after imaging and physical therapy. Pt did not receive any injection for his neck or back. Pt reports worsening BLE weakness. He states that he did not experience any improvement in his neck and RUE pain with physical therapy. He has been dropping objects from his right hand more frequently. He is also noticing worsening numbness in both hands. He notes continued low back and BLE pain pain radiating to the calf, left worse than right. He states that his leg pain changes in severity and in which leg it is worse. He notes burning pain in his bilateral feet worse after walking. He also has paraesthesias in his bilateral feet. He is unsure if his leg pain or back pain is worse stating that his pain moves throughout his body. Pt presents today with a cane.        Review of patient's allergies indicates:   Allergen Reactions    Invokana [canagliflozin] Anaphylaxis    Percocet [oxycodone-acetaminophen] Nausea Only and Hallucinations    Biaxin [clarithromycin]     Hydrocodone Other (See Comments)     Dizzy/nausea/hallucinations    Sulfa (sulfonamide antibiotics) Nausea Only and Rash       Past Medical History:   Diagnosis Date    Anxiety 12/18/2012    Back pain 12/18/2012    Cataract     Chronic pain syndrome 4/24/2016    Diabetes type 2, controlled 2/20/2016    Diabetic retinopathy     DM (diabetes mellitus) 12/18/2012    DM (diabetes mellitus), type 2, uncontrolled 11/16/2013    Essential hypertension 2/20/2016    Gastroesophageal reflux disease without esophagitis 2/20/2016    HIV (human immunodeficiency virus infection)     Hyperlipidemia 12/18/2012    Insomnia  8/7/2014    Neuropathy 11/16/2013     Past Surgical History:   Procedure Laterality Date    BACK SURGERY  2003    Lumbar Spine     Family History   Problem Relation Age of Onset    Cataracts Father     Hypertension Father     Parkinsonism Father     Alzheimer's disease Father     Migraines Mother     Hypertension Mother     Heart attack Mother     Amblyopia Neg Hx     Blindness Neg Hx     Glaucoma Neg Hx     Macular degeneration Neg Hx     Retinal detachment Neg Hx     Strabismus Neg Hx      Social History     Tobacco Use    Smoking status: Never Smoker    Smokeless tobacco: Never Used   Substance Use Topics    Alcohol use: Yes     Alcohol/week: 0.6 oz     Types: 1 Glasses of wine per week     Comment: occasionally    Drug use: No        Review of Systems   Constitutional: Negative.    HENT: Negative.    Eyes: Negative.    Respiratory: Negative.    Cardiovascular: Negative.    Gastrointestinal: Negative.    Endocrine: Negative.    Genitourinary: Negative.    Musculoskeletal: Positive for back pain (low back ), gait problem (cane), myalgias (BLE, L>R; RUE) and neck pain.   Skin: Negative.    Allergic/Immunologic: Negative.    Neurological: Positive for numbness (bilateral hands). Negative for weakness, light-headedness and headaches.        Paraesthesias in bilateral feet   Hematological: Negative.    Psychiatric/Behavioral: Negative.        OBJECTIVE:   Vital Signs:  Pulse: 91 (08/13/18 1410)  BP: (!) 142/83 (08/13/18 1410)    Physical Exam:    Vital signs: All nursing notes and vital signs reviewed -- afebrile, vital signs stable.  Constitutional: Patient sitting comfortably in chair. Appears well developed and well nourished.  Skin: Exposed areas are intact without abnormal markings, rashes or other lesions.  HEENT: Normocephalic. Normal conjunctivae.  Cardiovascular: Normal rate and regular rhythm.  Respiratory: Chest wall rises and falls symmetrically, without signs of respiratory  distress.  Abdomen: Soft and non-tender.  Extremities: Warm and without edema. Calves supple, non-tender.  Psych/Behavior: Normal affect.    Neurological:    Mental status: Alert and oriented. Conversational and appropriate.       Cranial Nerves: Grossly intact.     Motor:    Upper:  Deltoids Triceps Biceps WE WF     R 4/5 PL 4/5 PL 5/5 5/5 5/5 5/5    L 5/5 5/5 5/5 5/5 5/5 5/5      Lower:  HF KE KF DF PF EHL    R 5/5 5/5 5/5 5/5 5/5 5/5    L 5/5 5/5 5/5 5/5 5/5 5/5     Sensory: Intact sensation to light touch in all extremities. Romberg positive.    Reflexes:          DTR: 3+ patellar, bilaterally.     Lazar's: Negative.     Babinski's: Negative.     Clonus: Negative.    Cerebellar: Finger-to-nose and rapid alternating movements normal. Slow antalgic gait favoring the right. Mild ataxic gait.    Spine:    Posture: Head well aligned over pelvis in front and side views.  No focal or global spinal deformity visible on inspection. Shoulders and hips even. No obvious leg length discrepancy. No scapula winging.    Bending: Full ROM with forward, back and lateral bending. No rib prominence with forward bend.    Cervical:      ROM: Full with flexion, extension, lateral rotation and ear-to-shoulder bend. Pain with flexion.     Midline TTP: Negative.     Spurling's test: Negative.     Lhermitte's: Negative.    Thoracic:     Midline TTP: Negative    Lumbar:     Midline TTP: Negative     Straight Leg Test: Negative     Crossed Straight Leg Test: Negative     Sciatic notch tenderness: Negative.    Other:     SI joint TTP: Negative.     Greater trochanter TTP: Negative.     Tenderness with external/internal hip rotation: Negative.     Gowers' sign: Positive.     Diagnostic Results:  All imaging was independently reviewed by me.    MRI L-spine, dated 08/13/2018:  1. Loss of disc height at L4/5 with bilateral moderate neuroforaminal stenosis  2. No significant central setnosis    Flex/Ex X-ray C-spine, dated 8/2/2018:  1. No  dynamic instability    Flex/Ex X-ray L-spine, dated 8/2/2018:  1. No dynamic instability     ASSESSMENT/PLAN:     Cortes Schroeder is a 59 y/o male with history of chronic neck, RLE radiculopathy, as well as back and bilateral neurogenic claudication. Pt states that physical therapy did not help him. His MRI shows only moderate neuroforaminal stenosis at L4/5 bilaterally and his Flex/Ex X-rays show no dynamic instability. At this point, I recommend ESIs for the neck as well as lumbar spine and following that with physical therapy. He will follow up for reevaluation in 3 months.    The patient understands and agrees with the plan of care. All questions were answered.     1. Referral to Pain Management  2. Referral to Physical Therapy  3. RTC 3 months       I, Dr. Moe Dahl personally performed the services described in this documentation. All medical record entries made by the scribe, Nash Jimenez, were at my direction and in my presence.  I have reviewed the chart and agree that the record reflects my personal performance and is accurate and complete.      Moe Dahl M.D.  Department of Neurosurgery  [facility]      .

## 2018-08-15 ENCOUNTER — OFFICE VISIT (OUTPATIENT)
Dept: ENDOCRINOLOGY | Facility: CLINIC | Age: 58
End: 2018-08-15
Payer: MEDICAID

## 2018-08-15 VITALS
HEIGHT: 65 IN | DIASTOLIC BLOOD PRESSURE: 76 MMHG | BODY MASS INDEX: 33.86 KG/M2 | SYSTOLIC BLOOD PRESSURE: 114 MMHG | WEIGHT: 203.25 LBS | HEART RATE: 98 BPM

## 2018-08-15 DIAGNOSIS — E11.3299 NPDR (NONPROLIFERATIVE DIABETIC RETINOPATHY): ICD-10-CM

## 2018-08-15 DIAGNOSIS — E66.9 NON MORBID OBESITY, UNSPECIFIED OBESITY TYPE: ICD-10-CM

## 2018-08-15 DIAGNOSIS — Z20.6 EXPOSURE TO HIV: ICD-10-CM

## 2018-08-15 DIAGNOSIS — I10 ESSENTIAL HYPERTENSION: ICD-10-CM

## 2018-08-15 DIAGNOSIS — Z46.81 COUNSELING FOR INSULIN PUMP: ICD-10-CM

## 2018-08-15 PROCEDURE — 99214 OFFICE O/P EST MOD 30 MIN: CPT | Mod: PBBFAC,PN | Performed by: NURSE PRACTITIONER

## 2018-08-15 PROCEDURE — 99215 OFFICE O/P EST HI 40 MIN: CPT | Mod: S$PBB,,, | Performed by: NURSE PRACTITIONER

## 2018-08-15 PROCEDURE — 99999 PR PBB SHADOW E&M-EST. PATIENT-LVL IV: CPT | Mod: PBBFAC,,, | Performed by: NURSE PRACTITIONER

## 2018-08-15 RX ORDER — EMTRICITABINE AND TENOFOVIR DISOPROXIL FUMARATE 200; 300 MG/1; MG/1
TABLET, FILM COATED ORAL
Qty: 30 TABLET | Refills: 0 | Status: SHIPPED | OUTPATIENT
Start: 2018-08-15 | End: 2018-10-15 | Stop reason: SDUPTHER

## 2018-08-15 RX ORDER — INSULIN LISPRO 100 [IU]/ML
INJECTION, SOLUTION INTRAVENOUS; SUBCUTANEOUS
Qty: 30 ML | Refills: 0 | Status: SHIPPED | OUTPATIENT
Start: 2018-08-15 | End: 2018-09-13 | Stop reason: SDUPTHER

## 2018-08-15 NOTE — PROGRESS NOTES
CC: This 58 y.o. White male  is here for evaluation of  T2DM along with comorbidities indicated in the Visit Diagnosis section of this encounter.    HPI: Cortes Schroeder was diagnosed with T2DM in 1995. No medications started until 1999 under Dr. Guerrero's care. He was on an Tovey insulin pump under Dr. Marx's care for several years until ~ 2015 but stopped it bc of the cost of insulin pump supplies.     Initial visit on 7/10/18  Pt was previously followed by Dr. Mills but she no longer takes Medicaid. Pt started using an Omnipod in March and overall BGs have improved. The only problem he has is hearing the alarm beep when his insulin supply is low.   New to Endocrine at [facility]. Referred by Dr. Acosta.   Finds that his BG rises overnight and his FBGs are always high. However, upon review of his insulin pump report, his BGs are actually highest in the evenings. Also, he states he is consistently bolusing for dinner at night but this is not reflected in his insulin pump report, which reveals very few boluses at night. Does report he has been sleeping a lot because he was on a high dose of amitriptyline so perhaps this affected his memory and adherence. Amitriptyline dose will be halved to 50 mg starting tonight.   Plan Highly suspect BGs are high in the evenings d/t missed or late bolus for a snack/meal.  Carry PDM and testing supplies with you when you leave the house.   Do not bolus for a meal later than 15-20 minutes after a meal.  Even if you are not able to test your blood sugar before a meal, please still bolus for the amount of carbs you are eating at that meal.   Test blood sugar before each meal and bedtime - 4x/day.   Ideally eat 3 meals/day - or at least 2  Meals/day with a snack in between.   Please remember to bolus before each meal and snack.  Count all carbs and try not to rely on set carb meal settings. Encouraged him to use a carb counting leonard. Return to clinic in 1 mo with labs prior.  "    Interval history  He states he now carries PDM with him when he leaves the house. Has set an alarm to remind  Him to test BGs.   Again, he c/o high fasting BGs but insulin pump report reveals he is only bolusing for meals in the mornings and often skips dinner boluses. He did not remember that he needs to bolus for each meal even if not testing his BG beforehand. Only bolusing for insulin if he tests BG.     Highest BGs are actually at night. Yesterday night BG dmitry to 447 after Taco Tuesday, a weekly treat.     HOSPITALIZED FOR DIABETES OR RELATED COMPLICATIONS -  Yes -   DKA presumably r/t flu - Feb 2018   DKA 11/2016  DKA 2/2015    PRESCRIBED DIABETES MEDICATIONS: metformin 1000 mg bid, Januvia 100 mg once daily, Humalog in Omnipod pump   Misses medication doses -     Tends to bolus for set meals under 15, 30, 45, 60 grams carbs. He does feel comfortable with carb counting.     Basal rate 2 u/hr  ICR 4, ISF 15, goal 120, active insulin time 4 hours     Changes pod every 2 days.     DM COMPLICATIONS: nephropathy and peripheral neuropathy    SIGNIFICANT DIABETES MED HISTORY:   DKA on Invokana in 2/2015   Switched from Novolog 70/30 to toujeo/novolog ~ January 2018     SELF MONITORING BLOOD GLUCOSE: Checks blood glucose at home 2.5x/day. Average glucose 249, ranges 118-451    HYPOGLYCEMIC EPISODES: none     CURRENT DIET: eats 2 meals/day. Rarely eats 3 meals/day. Usually eats dinner every night.  Loves Taco Tuesday and has chips with salsa, 3 tacos with a frozen wallace (which may be as much as 60 grams of carbs). "Tuesdays are my bad days."     CURRENT EXERCISE: none d/t back pain     SOCIAL: his friend who has DM lives with him.       /76 (BP Location: Left arm, Patient Position: Sitting, BP Method: Large (Automatic))   Pulse 98   Ht 5' 5" (1.651 m)   Wt 92.2 kg (203 lb 4.2 oz)   BMI 33.82 kg/m²       ROS:   CONSTITUTIONAL: Appetite good, + fatigue  MS: + chronic back and neck pain "           PHYSICAL EXAM:  GENERAL: Well developed, well nourished. No acute distress.   PSYCH: AAOx3, appropriate mood and affect, conversant, well-groomed. Judgement and insight good.   NEURO: Cranial nerves grossly intact. Speech clear, no tremor.   CHEST: Respirations even and unlabored.   FEET: No skin breakdown.           Hemoglobin A1C   Date Value Ref Range Status   08/08/2018 9.4 (H) 4.0 - 5.6 % Final     Comment:     ADA Screening Guidelines:  5.7-6.4%  Consistent with prediabetes  >or=6.5%  Consistent with diabetes  High levels of fetal hemoglobin interfere with the HbA1C  assay. Heterozygous hemoglobin variants (HbS, HgC, etc)do  not significantly interfere with this assay.   However, presence of multiple variants may affect accuracy.     05/10/2018 9.1 (H) 4.0 - 5.6 % Final     Comment:     According to ADA guidelines, hemoglobin A1c <7.0% represents  optimal control in non-pregnant diabetic patients. Different  metrics may apply to specific patient populations.   Standards of Medical Care in Diabetes-2016.  For the purpose of screening for the presence of diabetes:  <5.7%     Consistent with the absence of diabetes  5.7-6.4%  Consistent with increasing risk for diabetes   (prediabetes)  >or=6.5%  Consistent with diabetes  Currently, no consensus exists for use of hemoglobin A1c  for diagnosis of diabetes for children.  This Hemoglobin A1c assay has significant interference with fetal   hemoglobin   (HbF). The results are invalid for patients with abnormal amounts of   HbF,   including those with known Hereditary Persistence   of Fetal Hemoglobin. Heterozygous hemoglobin variants (HbAS, HbAC,   HbAD, HbAE, HbA2) do not significantly interfere with this assay;   however, presence of multiple variants in a sample may impact the %   interference.     01/10/2018 >15.5 (H) 4.8 - 5.6 % Final     Comment:              Pre-diabetes: 5.7 - 6.4           Diabetes: >6.4           Glycemic control for adults with  diabetes: <7.0             Chemistry        Component Value Date/Time     07/16/2018 1150    K 4.6 07/16/2018 1150     07/16/2018 1150    CO2 27 07/16/2018 1150    BUN 14 07/16/2018 1150    CREATININE 0.9 07/16/2018 1150     (H) 07/16/2018 1150        Component Value Date/Time    CALCIUM 9.6 07/16/2018 1150    ALKPHOS 91 07/16/2018 1150    AST 20 07/16/2018 1150    ALT 33 07/16/2018 1150    BILITOT 0.3 07/16/2018 1150    ESTGFRAFRICA >60 07/16/2018 1150    EGFRNONAA >60 07/16/2018 1150          Lab Results   Component Value Date    LDLCALC 41 01/10/2018        Ref. Range 1/10/2018 08:21   Cholesterol Latest Ref Range: 100 - 199 mg/dL 141   HDL Latest Ref Range: >39 mg/dL 34 (L)   Triglycerides Latest Ref Range: 0 - 149 mg/dL 329 (H)   LDL Calculated Latest Ref Range: 0 - 99 mg/dL 41   VLDL   Cholesterol Say Latest Ref Range: 5 - 40 mg/dL 66 (H)       Lab Results   Component Value Date    MICALBCREAT 4.8 08/08/2018           STANDARDS of CARE:        ASA:               Last eye exam: 4/2018      ASSESSMENT and PLAN:    A1C GOAL: < 7 %     1. Uncontrolled type 2 diabetes mellitus with complication, with long-term current use of insulin  Reinforced importance of bolusing insulin ac. -- he is only bolusing once daily on most days.   Also needs to improve diet.   Carry PDM when leaving the house.   Test bg ac/hs, 4x/day.   rtc in 2 mo with labs prior.     Hemoglobin A1c   2. Non morbid obesity, unspecified obesity type  Improve diet.    3. NPDR (nonproliferative diabetic retinopathy)  Improve glycemic control.      4. Essential hypertension  controlled     Spent 40 minutes with patient with >50% time spent in counseling, as noted in # 1, 2.          Orders Placed This Encounter   Procedures    Hemoglobin A1c     Standing Status:   Future     Standing Expiration Date:   10/14/2019        Follow-up in about 2 months (around 10/15/2018).

## 2018-08-20 DIAGNOSIS — F32.A DEPRESSION, UNSPECIFIED DEPRESSION TYPE: ICD-10-CM

## 2018-08-20 DIAGNOSIS — G89.4 CHRONIC PAIN SYNDROME: ICD-10-CM

## 2018-08-21 RX ORDER — DULOXETIN HYDROCHLORIDE 60 MG/1
CAPSULE, DELAYED RELEASE ORAL
Qty: 30 CAPSULE | Refills: 90 | Status: SHIPPED | OUTPATIENT
Start: 2018-08-21 | End: 2019-08-30 | Stop reason: SDUPTHER

## 2018-09-13 DIAGNOSIS — E11.65 UNCONTROLLED TYPE 2 DIABETES MELLITUS WITH HYPERGLYCEMIA, WITH LONG-TERM CURRENT USE OF INSULIN: ICD-10-CM

## 2018-09-13 DIAGNOSIS — Z79.4 UNCONTROLLED TYPE 2 DIABETES MELLITUS WITH HYPERGLYCEMIA, WITH LONG-TERM CURRENT USE OF INSULIN: ICD-10-CM

## 2018-09-13 RX ORDER — METFORMIN HYDROCHLORIDE 1000 MG/1
TABLET ORAL
Qty: 180 TABLET | Refills: 0 | Status: SHIPPED | OUTPATIENT
Start: 2018-09-13 | End: 2019-03-12 | Stop reason: SDUPTHER

## 2018-09-13 RX ORDER — INSULIN LISPRO 100 [IU]/ML
INJECTION, SOLUTION INTRAVENOUS; SUBCUTANEOUS
Qty: 30 ML | Refills: 0 | Status: SHIPPED | OUTPATIENT
Start: 2018-09-13 | End: 2018-10-10 | Stop reason: SDUPTHER

## 2018-09-13 RX ORDER — NORTRIPTYLINE HYDROCHLORIDE 50 MG/1
50 CAPSULE ORAL NIGHTLY
COMMUNITY
Start: 2018-08-20 | End: 2018-10-18 | Stop reason: SDUPTHER

## 2018-10-01 DIAGNOSIS — Z20.6 EXPOSURE TO HIV: ICD-10-CM

## 2018-10-02 RX ORDER — EMTRICITABINE AND TENOFOVIR DISOPROXIL FUMARATE 200; 300 MG/1; MG/1
TABLET, FILM COATED ORAL
Qty: 30 TABLET | Refills: 0 | OUTPATIENT
Start: 2018-10-02

## 2018-10-03 ENCOUNTER — OFFICE VISIT (OUTPATIENT)
Dept: PODIATRY | Facility: CLINIC | Age: 58
End: 2018-10-03
Payer: MEDICAID

## 2018-10-03 VITALS — BODY MASS INDEX: 33.82 KG/M2 | WEIGHT: 203 LBS | HEIGHT: 65 IN

## 2018-10-03 DIAGNOSIS — E11.9 COMPREHENSIVE DIABETIC FOOT EXAMINATION, TYPE 2 DM, ENCOUNTER FOR: ICD-10-CM

## 2018-10-03 DIAGNOSIS — M20.5X1 HALLUX LIMITUS, ACQUIRED, RIGHT: ICD-10-CM

## 2018-10-03 DIAGNOSIS — M20.42 HAMMER TOES OF BOTH FEET: ICD-10-CM

## 2018-10-03 DIAGNOSIS — M20.5X2 HALLUX LIMITUS, ACQUIRED, LEFT: ICD-10-CM

## 2018-10-03 DIAGNOSIS — M20.41 HAMMER TOES OF BOTH FEET: ICD-10-CM

## 2018-10-03 DIAGNOSIS — R20.2 PARESTHESIA OF BOTH FEET: ICD-10-CM

## 2018-10-03 PROCEDURE — 99203 OFFICE O/P NEW LOW 30 MIN: CPT | Mod: S$PBB,,, | Performed by: PODIATRIST

## 2018-10-03 PROCEDURE — 99999 PR PBB SHADOW E&M-EST. PATIENT-LVL III: CPT | Mod: PBBFAC,,, | Performed by: PODIATRIST

## 2018-10-03 PROCEDURE — 99213 OFFICE O/P EST LOW 20 MIN: CPT | Mod: PBBFAC,PO | Performed by: PODIATRIST

## 2018-10-03 NOTE — PATIENT INSTRUCTIONS
Recommend lotions: eucerin, eucerin for diabetics, aquaphor, A&D ointment, gold bond for diabetics, sween, [place]'s Bees all purpose baby ointment,  urea 40 with aloe (found on amazon.com)    Shoe recommendations: (try 6pm.com, zappos.com , [email or web address].[email or web address], or shoes.[email or web address] for discounted prices) you can visit DSW shoes in [place]  or [facility] in the [facility] (there are also several shoe brand outlets in the [facility])    Asics (GT 2000 or gel foundations), new balance stability type shoes (900 series), saucony (stabil c3),  Madison (GTS or Beast or transcend), vionic, propet (tennis shoe)    sofft brand, clarks, crocs, aerosoles, naturalizers, SAS, ecco, born, yesica vizcarra, rockports (dress shoes)    Vionic, burkenstocks, fitflops, propet (sandals)  Nike comfort thong sandals, crocs, propet (house shoes)    Nail Home remedy:  Vicks Vapor rub to nails for easier manageability        Diabetes: Inspecting Your Feet  Diabetes increases your chances of developing foot problems. So inspect your feet every day. This helps you find small skin irritations before they become serious infections. If you have trouble seeing the bottoms of your feet, use a mirror or ask a family member or friend to help.     Pressure spots on the bottom of the foot are common areas where problems develop.   How to check your feet  Below are tips to help you look for foot problems. Try to check your feet at the same time each day, such as when you get out of bed in the morning:  · Check the top of each foot. The tops of toes, back of the heel, and outer edge of the foot can get a lot of rubbing from poor-fitting shoes.  · Check the bottom of each foot. Daily wear and tear often leads to problems at pressure spots.  · Check the toes and nails. Fungal infections often occur between toes. Toenail problems can also be a sign of fungal infections or lead to breaks in the skin.  · Check your shoes, too. Loose objects inside a shoe can injure the foot.  Use your hand to feel inside your shoes for things like kayden, loose stitching, or rough areas that could irritate your skin.  Warning signs  Look for any color changes in the foot. Redness with streaks can signal a severe infection, which needs immediate medical attention. Tell your doctor right away if you have any of these problems:  · Swelling, sometimes with color changes, may be a sign of poor blood flow or infection. Symptoms include tenderness and an increase in the size of your foot.  · Warm or hot areas on your feet may be signs of infection. A foot that is cold may not be getting enough blood.  · Sensations such as burning, tingling, or pins and needles can be signs of a problem. Also check for areas that may be numb.  · Hot spots are caused by friction or pressure. Look for hot spots in areas that get a lot of rubbing. Hot spots can turn into blisters, calluses, or sores.  · Cracks and sores are caused by dry or irritated skin. They are a sign that the skin is breaking down, which can lead to infection.  · Toenail problems to watch for include nails growing into the skin (ingrown toenail) and causing redness or pain. Thick, yellow, or discolored nails can signal a fungal infection.  · Drainage and odor can develop from untreated sores and ulcers. Call your doctor right away if you notice white or yellow drainage, bleeding, or unpleasant odor.   © 3943-3053 [employer]. [address] [place] [address]. All rights reserved. This information is not intended as a substitute for professional medical care. Always follow your healthcare professional's instructions.        Step-by-Step:  Inspecting Your Feet (Diabetes)    Date Last Reviewed: 10/1/2016  © 8808-4612 [employer]. [address] [place] [address]. All rights reserved. This information is not intended as a substitute for professional medical care. Always follow your healthcare professional's  instructions.

## 2018-10-03 NOTE — LETTER
October 5, 2018      Hira Acosta MD  [address]  [place] [address]           [facility]  [address]  [place] [address]  Phone: [phone]          Patient: Cortes Schroeder   MR Number: [id number]   YOB: 1960   Date of Visit: 10/3/2018       Dear Dr. Hira Acosta:    Thank you for referring Cortes Schroeder to me for evaluation. Attached you will find relevant portions of my assessment and plan of care.    If you have questions, please do not hesitate to call me. I look forward to following Cortes Schroeder along with you.    Sincerely,    Mary Saenz DPM    Enclosure  CC:  No Recipients    If you would like to receive this communication electronically, please contact [email or web address] or [phone] to request more information on [employer] access.    For providers and/or their staff who would like to refer a patient to [facility], please contact us through our one-stop-shop provider referral line, [facility], at [phone].    If you feel you have received this communication in error or would no longer like to receive these types of communications, please e-mail [email or web address]

## 2018-10-05 NOTE — PROGRESS NOTES
Subjective:      Patient ID: Cortes Schroeder is a 58 y.o. male.    Chief Complaint: Diabetes Mellitus (numbness Pcp Dr. Acosta 7/10/18); Diabetic Foot Exam; and Nail Care    Cortes is a 58 y.o. male who presents to the clinic upon referral from Dr. Acosta  for evaluation and treatment of diabetic feet. Cortes has a past medical history of Anxiety (12/18/2012), Back pain (12/18/2012), Cataract, Chronic pain syndrome (4/24/2016), Diabetes type 2, controlled (2/20/2016), Diabetic retinopathy, DM (diabetes mellitus) (12/18/2012), DM (diabetes mellitus), type 2, uncontrolled (11/16/2013), Essential hypertension (2/20/2016), Gastroesophageal reflux disease without esophagitis (2/20/2016), HIV (human immunodeficiency virus infection), Hyperlipidemia (12/18/2012), Insomnia (8/7/2014), and Neuropathy (11/16/2013). Patient relates no major problem with feet. Only complaints today consist of occasional numbness, tingling, burning to the feet.    PCP: Hira Acosta MD    Date Last Seen by PCP:   Chief Complaint   Patient presents with    Diabetes Mellitus     numbness Pcp Dr. Acosta 7/10/18    Diabetic Foot Exam    Nail Care         Current shoe gear: Casual shoes    Hemoglobin A1C   Date Value Ref Range Status   08/08/2018 9.4 (H) 4.0 - 5.6 % Final     Comment:     ADA Screening Guidelines:  5.7-6.4%  Consistent with prediabetes  >or=6.5%  Consistent with diabetes  High levels of fetal hemoglobin interfere with the HbA1C  assay. Heterozygous hemoglobin variants (HbS, HgC, etc)do  not significantly interfere with this assay.   However, presence of multiple variants may affect accuracy.     05/10/2018 9.1 (H) 4.0 - 5.6 % Final     Comment:     According to ADA guidelines, hemoglobin A1c <7.0% represents  optimal control in non-pregnant diabetic patients. Different  metrics may apply to specific patient populations.   Standards of Medical Care in Diabetes-2016.  For the purpose of screening for the presence of  diabetes:  <5.7%     Consistent with the absence of diabetes  5.7-6.4%  Consistent with increasing risk for diabetes   (prediabetes)  >or=6.5%  Consistent with diabetes  Currently, no consensus exists for use of hemoglobin A1c  for diagnosis of diabetes for children.  This Hemoglobin A1c assay has significant interference with fetal   hemoglobin   (HbF). The results are invalid for patients with abnormal amounts of   HbF,   including those with known Hereditary Persistence   of Fetal Hemoglobin. Heterozygous hemoglobin variants (HbAS, HbAC,   HbAD, HbAE, HbA2) do not significantly interfere with this assay;   however, presence of multiple variants in a sample may impact the %   interference.     01/10/2018 >15.5 (H) 4.8 - 5.6 % Final     Comment:              Pre-diabetes: 5.7 - 6.4           Diabetes: >6.4           Glycemic control for adults with diabetes: <7.0                 Patient Active Problem List   Diagnosis    Hyperlipidemia    Anxiety    Chronic bilateral low back pain without sciatica    Neuropathy    Uncontrolled type 2 diabetes mellitus with diabetic polyneuropathy, with long-term current use of insulin    NPDR (nonproliferative diabetic retinopathy)    Insomnia    Gastroesophageal reflux disease without esophagitis    Hypertension, well controlled    Chronic pain syndrome    Diabetic nephropathy associated with type 2 diabetes mellitus    Right sided weakness    Cervical syndrome    Chronic neck pain    Muscle weakness    Impaired motor control    Decreased range of motion (ROM) of shoulder       Current Outpatient Medications on File Prior to Visit   Medication Sig Dispense Refill    amitriptyline (ELAVIL) 50 MG tablet TK 1 T PO  ONCE A DAY  3    blood sugar diagnostic (FREESTYLE INSULINX TEST STRIPS) Strp 1 strip by Misc.(Non-Drug; Combo Route) route 4 (four) times daily before meals and nightly. 200 strip 8    clotrimazole-betamethasone 1-0.05% (LOTRISONE) cream Apply  topically 2 (two) times daily. 15 g 5    cyanocobalamin (VITAMIN B-12) 100 MCG tablet Take 100 mcg by mouth once daily.      DULoxetine (CYMBALTA) 60 MG capsule TAKE 1 CAPSULE(60 MG) BY MOUTH EVERY DAY 30 capsule 90    fish oil-omega-3 fatty acids 300-1,000 mg capsule Take by mouth 2 (two) times daily.      gabapentin (NEURONTIN) 100 MG capsule TAKE 1 TO 2 CAPSULES(100  MG) BY MOUTH EVERY EVENING 60 capsule 11    insulin lispro (HUMALOG U-100 INSULIN) 100 unit/mL injection  UNITS PER DAY WITH AUTOMATED INSULIN PUMP AS DIRECTED BY ENDOCRINOLOGY 30 mL 0    losartan (COZAAR) 50 MG tablet Take 1 tablet (50 mg total) by mouth once daily. 90 tablet 3    magnesium oxide-Mg AA chelate (MG-PLUS-PROTEIN) 133 mg Tab Take 500 mg by mouth every evening.       metFORMIN (GLUCOPHAGE) 1000 MG tablet TAKE 1 TABLET(1000 MG) BY MOUTH TWICE DAILY WITH MEALS 180 tablet 0    nortriptyline (PAMELOR) 50 MG capsule       pravastatin (PRAVACHOL) 80 MG tablet Take 1 tablet (80 mg total) by mouth once daily. 90 tablet 3    PROMETHAZINE/DEXTROMETHORPHAN (PROMETHAZINE-DM ORAL) Take by mouth as needed.      PROPYLENE GLYCOL//PF (SYSTANE, PF, OPHT) Apply to eye 4 (four) times daily.      SITagliptin (JANUVIA) 100 MG Tab Take 1 tablet (100 mg total) by mouth once daily. 90 tablet 3    subcutaneous insulin pump (OMNIPOD INSULIN MANAGEMENT MISC) by Misc.(Non-Drug; Combo Route) route.      TRUVADA 200-300 mg Tab TAKE 1 TABLET BY MOUTH EVERY DAY 30 tablet 0    vitamin D 1000 units Tab Take 185 mg by mouth 2 (two) times daily.       aspirin (ECOTRIN) 81 MG EC tablet Take 1 tablet (81 mg total) by mouth once daily.  0     No current facility-administered medications on file prior to visit.        Review of patient's allergies indicates:   Allergen Reactions    Invokana [canagliflozin] Anaphylaxis    Percocet [oxycodone-acetaminophen] Nausea Only and Hallucinations    Biaxin [clarithromycin]     Hydrocodone Other  (See Comments)     Dizzy/nausea/hallucinations    Sulfa (sulfonamide antibiotics) Nausea Only and Rash       Past Surgical History:   Procedure Laterality Date    BACK SURGERY  2003    Lumbar Spine       Family History   Problem Relation Age of Onset    Cataracts Father     Hypertension Father     Parkinsonism Father     Alzheimer's disease Father     Migraines Mother     Hypertension Mother     Heart attack Mother     Amblyopia Neg Hx     Blindness Neg Hx     Glaucoma Neg Hx     Macular degeneration Neg Hx     Retinal detachment Neg Hx     Strabismus Neg Hx        Social History     Socioeconomic History    Marital status:      Spouse name: Not on file    Number of children: Not on file    Years of education: Not on file    Highest education level: Not on file   Social Needs    Financial resource strain: Not on file    Food insecurity - worry: Not on file    Food insecurity - inability: Not on file    Transportation needs - medical: Not on file    Transportation needs - non-medical: Not on file   Occupational History    Not on file   Tobacco Use    Smoking status: Never Smoker    Smokeless tobacco: Never Used   Substance and Sexual Activity    Alcohol use: Yes     Alcohol/week: 0.6 oz     Types: 1 Glasses of wine per week     Comment: occasionally    Drug use: No    Sexual activity: Not Currently     Partners: Male     Birth control/protection: Condom     Comment: 10/2/17    Other Topics Concern    Not on file   Social History Narrative    Not on file       Review of Systems   Constitution: Negative for chills, fever and weakness.   Cardiovascular: Negative for claudication and leg swelling.   Respiratory: Negative for cough and shortness of breath.    Skin: Positive for dry skin. Negative for itching, nail changes and rash.   Musculoskeletal: Positive for arthritis, back pain, joint pain and myalgias. Negative for falls, joint swelling and muscle weakness.  "  Gastrointestinal: Negative for diarrhea, nausea and vomiting.   Neurological: Positive for numbness and paresthesias. Negative for tremors.   Psychiatric/Behavioral: Negative for altered mental status and hallucinations.           Objective:      Vitals:    10/03/18 1506   Weight: 92.1 kg (203 lb)   Height: 5' 5" (1.651 m)   PainSc:   3   PainLoc: Foot       Physical Exam   Constitutional: He appears well-developed and well-nourished.  Non-toxic appearance. He does not have a sickly appearance. No distress.   alert and oriented x 3.    Cardiovascular:   Pulses:       Dorsalis pedis pulses are 2+ on the right side, and 2+ on the left side.        Posterior tibial pulses are 2+ on the right side, and 2+ on the left side.    Capillary refill time is within normal limits. Digital hair present.    Pulmonary/Chest: No respiratory distress.   Musculoskeletal: He exhibits no deformity.        Right ankle: No tenderness. No lateral malleolus, no medial malleolus, no AITFL, no CF ligament and no posterior TFL tenderness found. Achilles tendon exhibits no pain, no defect and normal Fuller's test results.        Left ankle: No tenderness. No lateral malleolus, no medial malleolus, no AITFL, no CF ligament and no posterior TFL tenderness found. Achilles tendon exhibits no pain, no defect and normal Fuller's test results.        Right foot: There is no tenderness and no bony tenderness.        Left foot: There is no tenderness and no bony tenderness.   There is equinus deformity bilateral with decreased dorsiflexion at the ankle joint bilateral. No tenderness with compression of heel. Negative tinels sign. Gait analysis reveals excessive pronation through midstance and propulsion with early heel off. Shoes reveals lateral heel counter wear bilateral     Decreased first MPJ range of motion both weightbearing and nonweightbearing, no crepitus observed the first MP joint, + dorsal flag sign. Mild  bunion deformity is observed " .    Patient has hammertoes of digits 2-5 bilateral partially reducible     Muscle strength is 5/5 in all groups bilaterally.           Feet:   Right Foot:   Protective Sensation: 5 sites tested. 5 sites sensed.   Left Foot:   Protective Sensation: 5 sites tested. 5 sites sensed.   Lymphadenopathy:   No lymphatic streaking     Neurological: He displays no atrophy. No sensory deficit.   Light touch present    Paresthesias, and hyperesthesia bilateral feet at toes with no clearly identified trigger or source.       Skin: Skin is warm, dry and intact. No abrasion, no bruising, no burn, no lesion and no rash noted. He is not diaphoretic. No cyanosis or erythema. No pallor. Nails show no clubbing.   Skin is of normal turgor.   Normal temperature gradient.  Examination of the skin reveals no evidence of significant rashes, open lesions, suspicious appearing nevi or other concerning lesions.      Psychiatric: His mood appears not anxious. His affect is not inappropriate. His speech is not slurred. He is not combative. He is communicative. He is attentive.   Nursing note and vitals reviewed.            Assessment:       Encounter Diagnoses   Name Primary?    Uncontrolled type 2 diabetes mellitus with diabetic polyneuropathy, with long-term current use of insulin Yes    Paresthesia of both feet     Comprehensive diabetic foot examination, type 2 DM, encounter for     Hammer toes of both feet     Hallux limitus, acquired, right     Hallux limitus, acquired, left          Plan:     Problem List Items Addressed This Visit     Uncontrolled type 2 diabetes mellitus with diabetic polyneuropathy, with long-term current use of insulin - Primary    Relevant Orders    DIABETIC SHOES FOR HOME USE      Other Visit Diagnoses     Paresthesia of both feet        Relevant Orders    DIABETIC SHOES FOR HOME USE    Comprehensive diabetic foot examination, type 2 DM, encounter for        Hammer toes of both feet        Relevant Orders     DIABETIC SHOES FOR HOME USE    Hallux limitus, acquired, right        Relevant Orders    DIABETIC SHOES FOR HOME USE    Hallux limitus, acquired, left        Relevant Orders    DIABETIC SHOES FOR HOME USE         I counseled the patient on his conditions, their implications and medical management.    Orders Placed This Encounter    DIABETIC SHOES FOR HOME USE       Greater than 50% of this visit spent on counseling and coordination of care.    Education about the diabetic foot, neuropathy, and prevention of limb loss.    Shoe inspection. Diabetic Foot Education. Patient reminded of the importance of good nutrition/healthy diet/weight management and blood sugar control to help prevent podiatric complications of diabetes. Patient instructed on proper foot hygeine. Wear comfortable, proper fitting shoes. Wash feet daily. Dry well. After drying, apply moisturizer to feet (no lotion to webspaces). Inspect feet daily for skin breaks, blisters, swelling, or redness. Wear cotton socks (preferably white)  Change socks every day. Do NOT walk barefoot. Do NOT use heating pads or hot water soaks. We discussed wearing proper shoe gear, daily foot inspections, never walking without protective shoe gear.     Discussed edema control and the importance of daily moisturizer to the feet such as Gold bonds diabetic foot cream    Recommend applying vicks vaporub to thick abnormal toenails daily x 6 months to treat fungal nail infection.     Rx diabetic shoes for protection and support    He will continue to monitor the areas daily, inspect his feet, wear protective shoe gear when ambulatory, moisturizer to maintain skin integrity and follow in this office in approximately 12 months, sooner p.r.n.

## 2018-10-06 DIAGNOSIS — Z20.6 EXPOSURE TO HIV: ICD-10-CM

## 2018-10-07 RX ORDER — EMTRICITABINE AND TENOFOVIR DISOPROXIL FUMARATE 200; 300 MG/1; MG/1
TABLET, FILM COATED ORAL
Qty: 30 TABLET | Refills: 0 | OUTPATIENT
Start: 2018-10-07

## 2018-10-08 ENCOUNTER — PATIENT MESSAGE (OUTPATIENT)
Dept: FAMILY MEDICINE | Facility: CLINIC | Age: 58
End: 2018-10-08

## 2018-10-08 DIAGNOSIS — Z20.6 HIV EXPOSURE: Primary | ICD-10-CM

## 2018-10-10 RX ORDER — INSULIN LISPRO 100 [IU]/ML
INJECTION, SOLUTION INTRAVENOUS; SUBCUTANEOUS
Qty: 30 ML | Refills: 0 | Status: SHIPPED | OUTPATIENT
Start: 2018-10-10 | End: 2018-11-07 | Stop reason: SDUPTHER

## 2018-10-10 NOTE — TELEPHONE ENCOUNTER
I contacted the patient to inform him that his response from Reyes was the correct response and the response came directly from Dr. Artis. I also informed the patient that he had the option to schedule an appointment in the office or call back at the time of his last visit. Patient chose to call back at a later date. Patient states he works from 6am-6pm and can not come in for a visit. I informed the patient that in order to continue this medication, he will need to be seen in the office so he's going to have to take off from work. Patient was given 1st available appointment and placed on the wait list. No further questions or concerns.

## 2018-10-11 ENCOUNTER — PATIENT MESSAGE (OUTPATIENT)
Dept: UROLOGY | Facility: CLINIC | Age: 58
End: 2018-10-11

## 2018-10-11 DIAGNOSIS — N40.0 BENIGN PROSTATIC HYPERPLASIA, UNSPECIFIED WHETHER LOWER URINARY TRACT SYMPTOMS PRESENT: Primary | ICD-10-CM

## 2018-10-11 NOTE — TELEPHONE ENCOUNTER
Patient would like to have lab orders put in if needed for   His lab appt on 10/15/18.     Scheduled appt with Dr parmar on 10/15/18. This is the only day patient had available until 11/12/18.

## 2018-10-15 ENCOUNTER — OFFICE VISIT (OUTPATIENT)
Dept: FAMILY MEDICINE | Facility: CLINIC | Age: 58
End: 2018-10-15
Payer: MEDICAID

## 2018-10-15 ENCOUNTER — OFFICE VISIT (OUTPATIENT)
Dept: UROLOGY | Facility: CLINIC | Age: 58
End: 2018-10-15
Payer: MEDICAID

## 2018-10-15 ENCOUNTER — LAB VISIT (OUTPATIENT)
Dept: LAB | Facility: HOSPITAL | Age: 58
End: 2018-10-15
Attending: FAMILY MEDICINE
Payer: MEDICAID

## 2018-10-15 VITALS
WEIGHT: 210.13 LBS | HEIGHT: 65 IN | OXYGEN SATURATION: 97 % | SYSTOLIC BLOOD PRESSURE: 130 MMHG | HEART RATE: 95 BPM | RESPIRATION RATE: 14 BRPM | DIASTOLIC BLOOD PRESSURE: 84 MMHG | TEMPERATURE: 98 F | BODY MASS INDEX: 35.01 KG/M2

## 2018-10-15 VITALS
TEMPERATURE: 98 F | RESPIRATION RATE: 16 BRPM | HEART RATE: 91 BPM | WEIGHT: 207.25 LBS | HEIGHT: 65 IN | OXYGEN SATURATION: 96 % | SYSTOLIC BLOOD PRESSURE: 136 MMHG | BODY MASS INDEX: 34.53 KG/M2 | DIASTOLIC BLOOD PRESSURE: 80 MMHG

## 2018-10-15 VITALS — WEIGHT: 207 LBS | RESPIRATION RATE: 16 BRPM | BODY MASS INDEX: 34.49 KG/M2 | HEIGHT: 65 IN

## 2018-10-15 DIAGNOSIS — Z20.6 HIV EXPOSURE: ICD-10-CM

## 2018-10-15 DIAGNOSIS — Z23 NEED FOR INFLUENZA VACCINATION: ICD-10-CM

## 2018-10-15 DIAGNOSIS — N40.0 BENIGN PROSTATIC HYPERPLASIA, UNSPECIFIED WHETHER LOWER URINARY TRACT SYMPTOMS PRESENT: ICD-10-CM

## 2018-10-15 DIAGNOSIS — E11.9 TYPE 2 DIABETES MELLITUS WITHOUT COMPLICATION: ICD-10-CM

## 2018-10-15 DIAGNOSIS — N52.9 ED (ERECTILE DYSFUNCTION) OF ORGANIC ORIGIN: ICD-10-CM

## 2018-10-15 DIAGNOSIS — N40.1 BPH WITH OBSTRUCTION/LOWER URINARY TRACT SYMPTOMS: Primary | ICD-10-CM

## 2018-10-15 DIAGNOSIS — I10 HYPERTENSION, WELL CONTROLLED: Primary | ICD-10-CM

## 2018-10-15 DIAGNOSIS — F41.9 ANXIETY: ICD-10-CM

## 2018-10-15 DIAGNOSIS — E11.21 DIABETIC NEPHROPATHY ASSOCIATED WITH TYPE 2 DIABETES MELLITUS: ICD-10-CM

## 2018-10-15 DIAGNOSIS — R35.1 NOCTURIA MORE THAN TWICE PER NIGHT: ICD-10-CM

## 2018-10-15 DIAGNOSIS — Z20.6 EXPOSURE TO HIV: ICD-10-CM

## 2018-10-15 DIAGNOSIS — N13.8 BPH WITH OBSTRUCTION/LOWER URINARY TRACT SYMPTOMS: Primary | ICD-10-CM

## 2018-10-15 LAB
ALBUMIN SERPL BCP-MCNC: 3.7 G/DL
ALP SERPL-CCNC: 94 U/L
ALT SERPL W/O P-5'-P-CCNC: 49 U/L
ANION GAP SERPL CALC-SCNC: 8 MMOL/L
AST SERPL-CCNC: 30 U/L
BILIRUB SERPL-MCNC: 0.6 MG/DL
BUN SERPL-MCNC: 22 MG/DL
CALCIUM SERPL-MCNC: 9.4 MG/DL
CHLORIDE SERPL-SCNC: 104 MMOL/L
CO2 SERPL-SCNC: 25 MMOL/L
COMPLEXED PSA SERPL-MCNC: 0.28 NG/ML
CREAT SERPL-MCNC: 1.1 MG/DL
EST. GFR  (AFRICAN AMERICAN): >60 ML/MIN/1.73 M^2
EST. GFR  (NON AFRICAN AMERICAN): >60 ML/MIN/1.73 M^2
ESTIMATED AVG GLUCOSE: 226 MG/DL
GLUCOSE SERPL-MCNC: 139 MG/DL
HBA1C MFR BLD HPLC: 9.5 %
POTASSIUM SERPL-SCNC: 4.9 MMOL/L
PROT SERPL-MCNC: 7 G/DL
SODIUM SERPL-SCNC: 137 MMOL/L

## 2018-10-15 PROCEDURE — 36415 COLL VENOUS BLD VENIPUNCTURE: CPT | Mod: PO

## 2018-10-15 PROCEDURE — 86703 HIV-1/HIV-2 1 RESULT ANTBDY: CPT

## 2018-10-15 PROCEDURE — 99213 OFFICE O/P EST LOW 20 MIN: CPT | Mod: PBBFAC,27,PN,25 | Performed by: FAMILY MEDICINE

## 2018-10-15 PROCEDURE — 99213 OFFICE O/P EST LOW 20 MIN: CPT | Mod: PBBFAC,PO,25 | Performed by: FAMILY MEDICINE

## 2018-10-15 PROCEDURE — 84153 ASSAY OF PSA TOTAL: CPT

## 2018-10-15 PROCEDURE — 99213 OFFICE O/P EST LOW 20 MIN: CPT | Mod: PBBFAC,27 | Performed by: UROLOGY

## 2018-10-15 PROCEDURE — 99999 PR PBB SHADOW E&M-EST. PATIENT-LVL III: CPT | Mod: PBBFAC,,, | Performed by: FAMILY MEDICINE

## 2018-10-15 PROCEDURE — 83036 HEMOGLOBIN GLYCOSYLATED A1C: CPT

## 2018-10-15 PROCEDURE — 80053 COMPREHEN METABOLIC PANEL: CPT

## 2018-10-15 PROCEDURE — 99213 OFFICE O/P EST LOW 20 MIN: CPT | Mod: S$PBB,,, | Performed by: UROLOGY

## 2018-10-15 PROCEDURE — 81001 URINALYSIS AUTO W/SCOPE: CPT | Mod: PBBFAC | Performed by: UROLOGY

## 2018-10-15 PROCEDURE — 90686 IIV4 VACC NO PRSV 0.5 ML IM: CPT | Mod: PBBFAC,PO

## 2018-10-15 PROCEDURE — 99214 OFFICE O/P EST MOD 30 MIN: CPT | Mod: S$PBB,,, | Performed by: FAMILY MEDICINE

## 2018-10-15 PROCEDURE — 99999 PR PBB SHADOW E&M-EST. PATIENT-LVL III: CPT | Mod: PBBFAC,,, | Performed by: UROLOGY

## 2018-10-15 RX ORDER — EMTRICITABINE AND TENOFOVIR DISOPROXIL FUMARATE 200; 300 MG/1; MG/1
1 TABLET, FILM COATED ORAL DAILY
Qty: 30 TABLET | Refills: 2 | Status: SHIPPED | OUTPATIENT
Start: 2018-10-15 | End: 2018-12-07 | Stop reason: SDUPTHER

## 2018-10-15 NOTE — PROGRESS NOTES
Subjective:       Patient ID: Cortes Schroeder is a 58 y.o. male.    Chief Complaint: Diabetes (3 months follow up ); Hypertension (3 months follow up ); and Anxiety (3 months follow up )    HPI    DM2 F/u:  Pt is doing well with his insulin pump, but his am sugars are up in the low 200s. He is on Januvia 100mg as well and metformin 100mg daily.     Up 7.5 lbs.      htn- Blood pressure looks good today. He is adherent with his medication without side effects.     Anxiety - Over all, his stress level has decreased with his new job. He is doing well on nortriptyline. He is pleased with his current anxiety level.         Current Outpatient Medications on File Prior to Visit   Medication Sig Dispense Refill    aspirin (ECOTRIN) 81 MG EC tablet Take 1 tablet (81 mg total) by mouth once daily.  0    blood sugar diagnostic (FREESTYLE INSULINX TEST STRIPS) Strp 1 strip by Misc.(Non-Drug; Combo Route) route 4 (four) times daily before meals and nightly. 200 strip 8    clotrimazole-betamethasone 1-0.05% (LOTRISONE) cream Apply topically 2 (two) times daily. 15 g 5    cyanocobalamin (VITAMIN B-12) 100 MCG tablet Take 100 mcg by mouth once daily.      DULoxetine (CYMBALTA) 60 MG capsule TAKE 1 CAPSULE(60 MG) BY MOUTH EVERY DAY 30 capsule 90    fish oil-omega-3 fatty acids 300-1,000 mg capsule Take by mouth 2 (two) times daily.      gabapentin (NEURONTIN) 100 MG capsule TAKE 1 TO 2 CAPSULES(100  MG) BY MOUTH EVERY EVENING 60 capsule 11    insulin lispro (HUMALOG U-100 INSULIN) 100 unit/mL injection  UNITS PER DAY WITH AUTOMATED INSULIN PUMP AS DIRECTED BY ENDOCRINOLOGY 30 mL 0    losartan (COZAAR) 50 MG tablet Take 1 tablet (50 mg total) by mouth once daily. 90 tablet 3    magnesium oxide-Mg AA chelate (MG-PLUS-PROTEIN) 133 mg Tab Take 500 mg by mouth every evening.       metFORMIN (GLUCOPHAGE) 1000 MG tablet TAKE 1 TABLET(1000 MG) BY MOUTH TWICE DAILY WITH MEALS 180 tablet 0    nortriptyline (PAMELOR)  50 MG capsule Take 50 mg by mouth nightly.       pravastatin (PRAVACHOL) 80 MG tablet Take 1 tablet (80 mg total) by mouth once daily. 90 tablet 3    PROMETHAZINE/DEXTROMETHORPHAN (PROMETHAZINE-DM ORAL) Take by mouth as needed.      PROPYLENE GLYCOL//PF (SYSTANE, PF, OPHT) Apply to eye 4 (four) times daily.      SITagliptin (JANUVIA) 100 MG Tab Take 1 tablet (100 mg total) by mouth once daily. 90 tablet 3    subcutaneous insulin pump (OMNIPOD INSULIN MANAGEMENT MISC) by Misc.(Non-Drug; Combo Route) route.      TRUVADA 200-300 mg Tab TAKE 1 TABLET BY MOUTH EVERY DAY 30 tablet 0    vitamin D 1000 units Tab Take 185 mg by mouth 2 (two) times daily.       [DISCONTINUED] amitriptyline (ELAVIL) 50 MG tablet TK 1 T PO  ONCE A DAY  3     No current facility-administered medications on file prior to visit.        Past Medical History:   Diagnosis Date    Anxiety 12/18/2012    Back pain 12/18/2012    Cataract     Chronic pain syndrome 4/24/2016    Diabetes type 2, controlled 2/20/2016    Diabetic retinopathy     DM (diabetes mellitus) 12/18/2012    DM (diabetes mellitus), type 2, uncontrolled 11/16/2013    Essential hypertension 2/20/2016    Gastroesophageal reflux disease without esophagitis 2/20/2016    HIV (human immunodeficiency virus infection)     Hyperlipidemia 12/18/2012    Insomnia 8/7/2014    Neuropathy 11/16/2013       Family History   Problem Relation Age of Onset    Cataracts Father     Hypertension Father     Parkinsonism Father     Alzheimer's disease Father     Migraines Mother     Hypertension Mother     Heart attack Mother     Amblyopia Neg Hx     Blindness Neg Hx     Glaucoma Neg Hx     Macular degeneration Neg Hx     Retinal detachment Neg Hx     Strabismus Neg Hx         reports that  has never smoked. he has never used smokeless tobacco. He reports that he drinks about 0.6 oz of alcohol per week. He reports that he does not use drugs.    Review of Systems    Constitutional: Positive for activity change. Negative for unexpected weight change.   HENT: Positive for hearing loss. Negative for rhinorrhea and trouble swallowing.    Eyes: Negative for discharge and visual disturbance.   Respiratory: Negative for chest tightness and wheezing.    Cardiovascular: Negative for chest pain and palpitations.   Gastrointestinal: Negative for blood in stool, constipation, diarrhea and vomiting.   Endocrine: Negative for polydipsia and polyuria.   Genitourinary: Negative for difficulty urinating, hematuria and urgency.   Musculoskeletal: Positive for arthralgias and neck pain. Negative for joint swelling.   Neurological: Positive for weakness. Negative for headaches.   Psychiatric/Behavioral: Negative for confusion and dysphoric mood.       Objective:     Vitals:    10/15/18 0836   BP: 136/80   Pulse: 91   Resp: 16   Temp: 98.1 °F (36.7 °C)        Physical Exam   Constitutional: He appears well-developed. No distress.   O   HENT:   Head: Normocephalic and atraumatic.   Eyes: Conjunctivae are normal. No scleral icterus.   Pulmonary/Chest: Effort normal.   Neurological: He is alert.   Psychiatric: He has a normal mood and affect. His behavior is normal.   Nursing note and vitals reviewed.      Assessment:       1. Hypertension, well controlled    2. Diabetic nephropathy associated with type 2 diabetes mellitus    3. Anxiety    4. Need for influenza vaccination        Plan:       Cortes was seen today for diabetes, hypertension and anxiety.    Diagnoses and all orders for this visit:    Hypertension, well controlled  - Chronic - stable     Pt is doing well on current therapy. No side effects noted. Will continue current therapy.    Diabetic nephropathy associated with type 2 diabetes mellitus  Uncontrolled - I advised improved diligence with reducing carb intake. His a1c has improved, but is still above 9. Goal < 8.     Anxiety  Significantly improved! Will continue current management.      Need for influenza vaccination  -     Influenza - Quadrivalent (3 years & older) (PF)            Follow-up in about 3 months (around 1/15/2019) for Diabetes Type 2, Hypertension, Anxiety.        Pt verbalized understanding and agreed with our plan.

## 2018-10-15 NOTE — PROGRESS NOTES
Subjective:       Patient ID: Cortes Schroeder is a 58 y.o. male who was last seen in this office Visit date not found    Chief Complaint:   Chief Complaint   Patient presents with    Follow-up         History of Kidney Stones  He has never required a procedure.  He passed a stone in 2003. He passed another large stone last month.  He is here to bring it in for analysis.  He feels fine, no hematuria or dysuria or flank pain.    Erectile Dysfunction  Patient complains of erectile dysfunction. Onset of dysfunction was several years ago and was gradual in onset.  Patient states the nature of difficulty is both attaining and maintaining erection. Full erections occur never. Partial erections occur never. Libido is not affected. Risk factors for ED include diabetes mellitus. Patient denies history of pelvic radiation. Previous treatment of ED includes Viagra and Cialis and Trimix.  His  passed away in May 2017, he is not interested in medications at present.    Benign Prostatic Hyperplasia  He patient reports frequency and nocturia two times a night. He denies straining, urgency and weak stream. The patient states symptoms are of mild severity. Onset of symptoms was several years ago and was gradual in onset.  He has no personal history and no family history of prostate cancer. He reports a history of kidney stones. He denies flank pain, gross hematuria and recurrent UTI.  He is currently taking no prostate medications.      ACTIVE MEDICAL ISSUES:  Patient Active Problem List   Diagnosis    Hyperlipidemia    Anxiety    Chronic bilateral low back pain without sciatica    Neuropathy    Uncontrolled type 2 diabetes mellitus with diabetic polyneuropathy, with long-term current use of insulin    NPDR (nonproliferative diabetic retinopathy)    Insomnia    Gastroesophageal reflux disease without esophagitis    Hypertension, well controlled    Chronic pain syndrome    Diabetic nephropathy associated with type  2 diabetes mellitus    Right sided weakness    Cervical syndrome    Chronic neck pain    Muscle weakness    Impaired motor control    Decreased range of motion (ROM) of shoulder       ALLERGIES AND MEDICATIONS: updated and reviewed.  Review of patient's allergies indicates:   Allergen Reactions    Invokana [canagliflozin] Anaphylaxis    Percocet [oxycodone-acetaminophen] Nausea Only and Hallucinations    Biaxin [clarithromycin]     Hydrocodone Other (See Comments)     Dizzy/nausea/hallucinations    Sulfa (sulfonamide antibiotics) Nausea Only and Rash     Current Outpatient Medications   Medication Sig    aspirin (ECOTRIN) 81 MG EC tablet Take 1 tablet (81 mg total) by mouth once daily.    blood sugar diagnostic (FREESTYLE INSULINX TEST STRIPS) Strp 1 strip by Misc.(Non-Drug; Combo Route) route 4 (four) times daily before meals and nightly.    clotrimazole-betamethasone 1-0.05% (LOTRISONE) cream Apply topically 2 (two) times daily.    cyanocobalamin (VITAMIN B-12) 100 MCG tablet Take 100 mcg by mouth once daily.    DULoxetine (CYMBALTA) 60 MG capsule TAKE 1 CAPSULE(60 MG) BY MOUTH EVERY DAY    fish oil-omega-3 fatty acids 300-1,000 mg capsule Take by mouth 2 (two) times daily.    gabapentin (NEURONTIN) 100 MG capsule TAKE 1 TO 2 CAPSULES(100  MG) BY MOUTH EVERY EVENING    insulin lispro (HUMALOG U-100 INSULIN) 100 unit/mL injection  UNITS PER DAY WITH AUTOMATED INSULIN PUMP AS DIRECTED BY ENDOCRINOLOGY    losartan (COZAAR) 50 MG tablet Take 1 tablet (50 mg total) by mouth once daily.    magnesium oxide-Mg AA chelate (MG-PLUS-PROTEIN) 133 mg Tab Take 500 mg by mouth every evening.     metFORMIN (GLUCOPHAGE) 1000 MG tablet TAKE 1 TABLET(1000 MG) BY MOUTH TWICE DAILY WITH MEALS    nortriptyline (PAMELOR) 50 MG capsule Take 50 mg by mouth nightly.     pravastatin (PRAVACHOL) 80 MG tablet Take 1 tablet (80 mg total) by mouth once daily.    PROMETHAZINE/DEXTROMETHORPHAN  "(PROMETHAZINE-DM ORAL) Take by mouth as needed.    PROPYLENE GLYCOL//PF (SYSTANE, PF, OPHT) Apply to eye 4 (four) times daily.    SITagliptin (JANUVIA) 100 MG Tab Take 1 tablet (100 mg total) by mouth once daily.    subcutaneous insulin pump (OMNIPOD INSULIN MANAGEMENT MISC) by Misc.(Non-Drug; Combo Route) route.    TRUVADA 200-300 mg Tab TAKE 1 TABLET BY MOUTH EVERY DAY    vitamin D 1000 units Tab Take 185 mg by mouth 2 (two) times daily.      No current facility-administered medications for this visit.        Review of Systems   Constitutional: Negative for activity change, fatigue, fever and unexpected weight change.   HENT: Negative for congestion.    Eyes: Negative for redness.   Respiratory: Negative for chest tightness and shortness of breath.    Cardiovascular: Negative for chest pain and leg swelling.   Gastrointestinal: Negative for abdominal pain, constipation, diarrhea, nausea and vomiting.   Genitourinary: Negative for dysuria, flank pain, frequency, hematuria, penile pain, penile swelling, scrotal swelling, testicular pain and urgency.   Musculoskeletal: Negative for arthralgias and back pain.   Neurological: Negative for dizziness and light-headedness.   Psychiatric/Behavioral: Negative for behavioral problems and confusion. The patient is not nervous/anxious.    All other systems reviewed and are negative.      Objective:      Vitals:    10/15/18 1405   Resp: 16   Weight: 93.9 kg (207 lb)   Height: 5' 5" (1.651 m)     Physical Exam   Nursing note and vitals reviewed.  Constitutional: He is oriented to person, place, and time. He appears well-developed and well-nourished.   HENT:   Head: Normocephalic.   Eyes: Conjunctivae are normal.   Neck: Normal range of motion. Neck supple. No tracheal deviation present. No thyromegaly present.   Cardiovascular: Normal rate and normal heart sounds.    Pulmonary/Chest: Effort normal and breath sounds normal. No respiratory distress. He has no wheezes. "   Abdominal: Soft. Bowel sounds are normal. There is no hepatosplenomegaly. There is no tenderness. There is no rebound and no CVA tenderness. No hernia.   Musculoskeletal: Normal range of motion. He exhibits no edema or tenderness.   Lymphadenopathy:     He has no cervical adenopathy.   Neurological: He is alert and oriented to person, place, and time.   Skin: Skin is warm and dry. No rash noted. No erythema.     Psychiatric: He has a normal mood and affect. His behavior is normal. Judgment and thought content normal.       Urine dipstick shows negative for all components.  Micro exam: negative for WBC's or RBC's.    Assessment:       1. BPH with obstruction/lower urinary tract symptoms    2. ED (erectile dysfunction) of organic origin    3. Nocturia more than twice per night          Plan:       1. BPH with obstruction/lower urinary tract symptoms  BECKY next time  Will release PSA when it returns    2. ED (erectile dysfunction) of organic origin  Trimix when needed    3. Nocturia more than twice per night  Limit evening fluids  Diabetes control            Follow-up in about 6 months (around 4/15/2019) for Follow up.

## 2018-10-15 NOTE — PROGRESS NOTES
Routine Office Visit    Patient Name: Cortes Schroeder    : 1960  MRN: [id number]    Subjective:  Cortes is a 58 y.o. male who presents today for:    1. PrEP  Patient presenting today for follow up of PrEP.  He has been taking medication as prescribed.  He has not had any side effects related to the medication.  He initially thought that his depression had worsened due to Truvada (which is a side effect), but no changes with adjusting timing of medication.  He states that he was feeling down due to be overwhelmed.  He is not currently sexually active.  When he is, its the same partner.  They occasionally use condoms.  He states that blood sugars have been doing better.   No rashes, penile discharge, night sweats, or unexplained weight loss    Past Medical History  Past Medical History:   Diagnosis Date    Anxiety 2012    Back pain 2012    Cataract     Chronic pain syndrome 2016    Diabetes type 2, controlled 2016    Diabetic retinopathy     DM (diabetes mellitus) 2012    DM (diabetes mellitus), type 2, uncontrolled 2013    Essential hypertension 2016    Gastroesophageal reflux disease without esophagitis 2016    HIV (human immunodeficiency virus infection)     Hyperlipidemia 2012    Insomnia 2014    Neuropathy 2013       Past Surgical History  Past Surgical History:   Procedure Laterality Date    BACK SURGERY      Lumbar Spine       Family History  Family History   Problem Relation Age of Onset    Cataracts Father     Hypertension Father     Parkinsonism Father     Alzheimer's disease Father     Migraines Mother     Hypertension Mother     Heart attack Mother     Amblyopia Neg Hx     Blindness Neg Hx     Glaucoma Neg Hx     Macular degeneration Neg Hx     Retinal detachment Neg Hx     Strabismus Neg Hx        Social History  Social History     Socioeconomic History    Marital status:      Spouse name: Not on file     Number of children: Not on file    Years of education: Not on file    Highest education level: Not on file   Social Needs    Financial resource strain: Not on file    Food insecurity - worry: Not on file    Food insecurity - inability: Not on file    Transportation needs - medical: Not on file    Transportation needs - non-medical: Not on file   Occupational History    Not on file   Tobacco Use    Smoking status: Never Smoker    Smokeless tobacco: Never Used   Substance and Sexual Activity    Alcohol use: Yes     Alcohol/week: 0.6 oz     Types: 1 Glasses of wine per week     Comment: occasionally    Drug use: No    Sexual activity: Not Currently     Partners: Male     Birth control/protection: Condom     Comment: 10/2/17    Other Topics Concern    Not on file   Social History Narrative    Not on file       Current Medications  Current Outpatient Medications on File Prior to Visit   Medication Sig Dispense Refill    aspirin (ECOTRIN) 81 MG EC tablet Take 1 tablet (81 mg total) by mouth once daily.  0    blood sugar diagnostic (FREESTYLE INSULINX TEST STRIPS) Strp 1 strip by Misc.(Non-Drug; Combo Route) route 4 (four) times daily before meals and nightly. 200 strip 8    clotrimazole-betamethasone 1-0.05% (LOTRISONE) cream Apply topically 2 (two) times daily. 15 g 5    cyanocobalamin (VITAMIN B-12) 100 MCG tablet Take 100 mcg by mouth once daily.      fish oil-omega-3 fatty acids 300-1,000 mg capsule Take by mouth 2 (two) times daily.      gabapentin (NEURONTIN) 100 MG capsule TAKE 1 TO 2 CAPSULES(100  MG) BY MOUTH EVERY EVENING 60 capsule 11    insulin lispro (HUMALOG U-100 INSULIN) 100 unit/mL injection  UNITS PER DAY WITH AUTOMATED INSULIN PUMP AS DIRECTED BY ENDOCRINOLOGY 30 mL 0    losartan (COZAAR) 50 MG tablet Take 1 tablet (50 mg total) by mouth once daily. 90 tablet 3    magnesium oxide-Mg AA chelate (MG-PLUS-PROTEIN) 133 mg Tab Take 500 mg by mouth every evening.        metFORMIN (GLUCOPHAGE) 1000 MG tablet TAKE 1 TABLET(1000 MG) BY MOUTH TWICE DAILY WITH MEALS 180 tablet 0    nortriptyline (PAMELOR) 50 MG capsule Take 50 mg by mouth nightly.       pravastatin (PRAVACHOL) 80 MG tablet Take 1 tablet (80 mg total) by mouth once daily. 90 tablet 3    PROPYLENE GLYCOL//PF (SYSTANE, PF, OPHT) Apply to eye 4 (four) times daily.      SITagliptin (JANUVIA) 100 MG Tab Take 1 tablet (100 mg total) by mouth once daily. 90 tablet 3    subcutaneous insulin pump (OMNIPOD INSULIN MANAGEMENT MISC) by Misc.(Non-Drug; Combo Route) route.      TRUVADA 200-300 mg Tab TAKE 1 TABLET BY MOUTH EVERY DAY 30 tablet 0    vitamin D 1000 units Tab Take 185 mg by mouth 2 (two) times daily.       DULoxetine (CYMBALTA) 60 MG capsule TAKE 1 CAPSULE(60 MG) BY MOUTH EVERY DAY 30 capsule 90    PROMETHAZINE/DEXTROMETHORPHAN (PROMETHAZINE-DM ORAL) Take by mouth as needed.      [DISCONTINUED] amitriptyline (ELAVIL) 50 MG tablet TK 1 T PO  ONCE A DAY  3     No current facility-administered medications on file prior to visit.        Allergies   Review of patient's allergies indicates:   Allergen Reactions    Invokana [canagliflozin] Anaphylaxis    Percocet [oxycodone-acetaminophen] Nausea Only and Hallucinations    Biaxin [clarithromycin]     Hydrocodone Other (See Comments)     Dizzy/nausea/hallucinations    Sulfa (sulfonamide antibiotics) Nausea Only and Rash       Review of Systems (Pertinent positives)  Review of Systems   HENT: Positive for hearing loss.    Eyes: Negative for discharge.   Respiratory: Negative for wheezing.    Cardiovascular: Negative for chest pain and palpitations.   Gastrointestinal: Negative for blood in stool, constipation, diarrhea and vomiting.   Genitourinary: Negative for hematuria and urgency.   Musculoskeletal: Positive for joint pain and neck pain.   Neurological: Negative for weakness and headaches.   Endo/Heme/Allergies: Negative for polydipsia.         BP  "130/84 (BP Location: Left arm, Patient Position: Sitting, BP Method: Medium (Manual))   Pulse 95   Temp 98.1 °F (36.7 °C) (Oral)   Resp 14   Ht 5' 5" (1.651 m)   Wt 95.3 kg (210 lb 1.6 oz)   SpO2 97%   BMI 34.96 kg/m²     GENERAL APPEARANCE: in no apparent distress and well developed and well nourished  HEENT: PERRL, EOMI, Sclera clear, anicteric, Oropharynx clear, no lesions, Neck supple with midline trachea  NECK: normal, supple, no adenopathy, thyroid normal in size  RESPIRATORY: appears well, vitals normal, no respiratory distress, acyanotic, normal RR, chest clear, no wheezing, crepitations, rhonchi, normal symmetric air entry  HEART: regular rate and rhythm, S1, S2 normal, no murmur, click, rub or gallop.    ABDOMEN: abdomen is soft without tenderness, no masses, no hernias, no organomegaly, no rebound, no guarding. Suprapubic tenderness absent. No CVA tenderness.  SKIN: no rashes, no wounds, no other lesions  PSYCH: Alert, oriented x 3, thought content appropriate, speech normal, pleasant and cooperative, good eye contact, well groomed    Assessment/Plan:  Cortes Schroeder is a 58 y.o. male who presents today for :    Diagnoses and all orders for this visit:    Exposure to HIV  -     emtricitabine-tenofovir 200-300 mg (TRUVADA) 200-300 mg Tab; Take 1 tablet by mouth once daily.      1.  Truvada refilled  2.  Labs to be done today to evaluate HIV status and renal function  3.  Follow up 3 months or sooner if needed    Don Artis MD    "

## 2018-10-16 LAB — HIV 1+2 AB+HIV1 P24 AG SERPL QL IA: NEGATIVE

## 2018-10-17 ENCOUNTER — PATIENT MESSAGE (OUTPATIENT)
Dept: FAMILY MEDICINE | Facility: CLINIC | Age: 58
End: 2018-10-17

## 2018-10-17 ENCOUNTER — TELEPHONE (OUTPATIENT)
Dept: FAMILY MEDICINE | Facility: CLINIC | Age: 58
End: 2018-10-17

## 2018-10-17 NOTE — TELEPHONE ENCOUNTER
----- Message from Hira Acosta MD sent at 10/16/2018  5:31 PM CDT -----  Please notify patient results are abnormal. Nothing emergent needs to be done.  Please have him continue to be diligent about his carbohydrate intake because this is likely the source of his problem.  Please have him continue follow-up with Endocrinology to manage his insulin pump.

## 2018-10-18 RX ORDER — NORTRIPTYLINE HYDROCHLORIDE 50 MG/1
50 CAPSULE ORAL NIGHTLY
Qty: 30 CAPSULE | Refills: 2 | Status: SHIPPED | OUTPATIENT
Start: 2018-10-18 | End: 2019-03-12 | Stop reason: SDUPTHER

## 2018-11-06 DIAGNOSIS — I10 HYPERTENSION, UNCONTROLLED: ICD-10-CM

## 2018-11-06 DIAGNOSIS — E78.2 MIXED HYPERLIPIDEMIA: ICD-10-CM

## 2018-11-07 RX ORDER — INSULIN LISPRO 100 [IU]/ML
INJECTION, SOLUTION INTRAVENOUS; SUBCUTANEOUS
Qty: 30 ML | Refills: 0 | Status: SHIPPED | OUTPATIENT
Start: 2018-11-07 | End: 2018-12-07 | Stop reason: SDUPTHER

## 2018-11-07 RX ORDER — PRAVASTATIN SODIUM 80 MG/1
TABLET ORAL
Qty: 90 TABLET | Refills: 3 | Status: SHIPPED | OUTPATIENT
Start: 2018-11-07 | End: 2019-12-10 | Stop reason: SDUPTHER

## 2018-11-07 RX ORDER — LOSARTAN POTASSIUM 50 MG/1
TABLET ORAL
Qty: 90 TABLET | Refills: 3 | Status: SHIPPED | OUTPATIENT
Start: 2018-11-07 | End: 2019-10-29 | Stop reason: SDUPTHER

## 2018-11-12 ENCOUNTER — OFFICE VISIT (OUTPATIENT)
Dept: ENDOCRINOLOGY | Facility: CLINIC | Age: 58
End: 2018-11-12
Payer: COMMERCIAL

## 2018-11-12 ENCOUNTER — OFFICE VISIT (OUTPATIENT)
Dept: FAMILY MEDICINE | Facility: CLINIC | Age: 58
End: 2018-11-12
Payer: COMMERCIAL

## 2018-11-12 VITALS
OXYGEN SATURATION: 96 % | DIASTOLIC BLOOD PRESSURE: 72 MMHG | SYSTOLIC BLOOD PRESSURE: 124 MMHG | WEIGHT: 206.13 LBS | RESPIRATION RATE: 18 BRPM | HEART RATE: 103 BPM | TEMPERATURE: 98 F | BODY MASS INDEX: 34.34 KG/M2 | HEIGHT: 65 IN

## 2018-11-12 VITALS
DIASTOLIC BLOOD PRESSURE: 72 MMHG | BODY MASS INDEX: 34.53 KG/M2 | HEIGHT: 65 IN | HEART RATE: 97 BPM | SYSTOLIC BLOOD PRESSURE: 125 MMHG | WEIGHT: 207.25 LBS

## 2018-11-12 DIAGNOSIS — M48.02 CERVICAL STENOSIS OF SPINE: Primary | ICD-10-CM

## 2018-11-12 DIAGNOSIS — E78.1 HYPERTRIGLYCERIDEMIA: ICD-10-CM

## 2018-11-12 DIAGNOSIS — Z20.6 HIV EXPOSURE: ICD-10-CM

## 2018-11-12 DIAGNOSIS — I10 ESSENTIAL HYPERTENSION: ICD-10-CM

## 2018-11-12 DIAGNOSIS — Z71.9 VISIT FOR COUNSELING: ICD-10-CM

## 2018-11-12 DIAGNOSIS — E11.3299 NPDR (NONPROLIFERATIVE DIABETIC RETINOPATHY): ICD-10-CM

## 2018-11-12 PROCEDURE — 99214 OFFICE O/P EST MOD 30 MIN: CPT | Mod: S$GLB,,, | Performed by: FAMILY MEDICINE

## 2018-11-12 PROCEDURE — 3008F BODY MASS INDEX DOCD: CPT | Mod: CPTII,S$GLB,, | Performed by: NURSE PRACTITIONER

## 2018-11-12 PROCEDURE — 99215 OFFICE O/P EST HI 40 MIN: CPT | Mod: S$GLB,,, | Performed by: NURSE PRACTITIONER

## 2018-11-12 PROCEDURE — 3074F SYST BP LT 130 MM HG: CPT | Mod: CPTII,S$GLB,, | Performed by: FAMILY MEDICINE

## 2018-11-12 PROCEDURE — 3008F BODY MASS INDEX DOCD: CPT | Mod: CPTII,S$GLB,, | Performed by: FAMILY MEDICINE

## 2018-11-12 PROCEDURE — 99999 PR PBB SHADOW E&M-EST. PATIENT-LVL IV: CPT | Mod: PBBFAC,,, | Performed by: FAMILY MEDICINE

## 2018-11-12 PROCEDURE — 3078F DIAST BP <80 MM HG: CPT | Mod: CPTII,S$GLB,, | Performed by: NURSE PRACTITIONER

## 2018-11-12 PROCEDURE — 3074F SYST BP LT 130 MM HG: CPT | Mod: CPTII,S$GLB,, | Performed by: NURSE PRACTITIONER

## 2018-11-12 PROCEDURE — 3046F HEMOGLOBIN A1C LEVEL >9.0%: CPT | Mod: CPTII,S$GLB,, | Performed by: NURSE PRACTITIONER

## 2018-11-12 PROCEDURE — 99999 PR PBB SHADOW E&M-EST. PATIENT-LVL V: CPT | Mod: PBBFAC,,, | Performed by: NURSE PRACTITIONER

## 2018-11-12 PROCEDURE — 3078F DIAST BP <80 MM HG: CPT | Mod: CPTII,S$GLB,, | Performed by: FAMILY MEDICINE

## 2018-11-12 NOTE — PATIENT INSTRUCTIONS
Increase basal rate by 20% to 2.4 units/hr.   Increase carb ratio from 4 to 3.   Decrease active insulin time for 4 to 3 hours.     Test blood sugar 4x/day.   Please remember to bolus before each meal and snack.     Return to clinic in 3 months with labs prior.

## 2018-11-12 NOTE — PROGRESS NOTES
CC: This 58 y.o. White male  is here for evaluation of  T2DM along with comorbidities indicated in the Visit Diagnosis section of this encounter.    HPI: Cortes Schroeder was diagnosed with T2DM in 1995. No medications started until 1999 under Dr. Guerrero's care. He was on an [place] insulin pump under Dr. Marx's care for several years until ~ 2015 but stopped it bc of the cost of insulin pump supplies.     Pt was previously followed by Dr. Mills but transferred to [facility] when she no longer took his insurance.     Prior visit on 8/15/18  He states he now carries PDM with him when he leaves the house. Has set an alarm to remind  Him to test BGs.   Again, he c/o high fasting BGs but insulin pump report reveals he is only bolusing for meals in the mornings and often skips dinner boluses. He did not remember that he needs to bolus for each meal even if not testing his BG beforehand. Only bolusing for insulin if he tests BG.   Highest BGs are actually at night. Yesterday night BG dmitry to 447 after Taco Tuesday, a weekly treat.   Plan Reinforced importance of bolusing insulin ac. -- he is only bolusing once daily on most days.   Also needs to improve diet.   Carry PDM when leaving the house.   Test bg ac/hs, 4x/day.   rtc in 2 mo with labs prior.     Interval history  a1c is the same at 9.5%. He is doing a better job testing his BGs 3-4x/day, although has been more lax in the last few days. Also seems to be better at bolusing ac. Still missing boluses sometimes however.   He has gained 3 lb since lov. He has only 2 pods left.   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     Average TDD 79 units, basal 57%.     HOSPITALIZED FOR DIABETES OR RELATED COMPLICATIONS -  Yes -   DKA presumably r/t flu - Feb 2018   DKA 11/2016  DKA 2/2015    PRESCRIBED DIABETES MEDICATIONS: metformin 1000 mg bid, Januvia 100 mg once daily, Humalog in Omnipod pump   Misses medication doses -     Tends to bolus for set meals under 15, 30, 45,  "60 grams carbs. He does feel comfortable with carb counting.     Basal rate 2 u/hr  ICR 4, ISF 15, goal 120, active insulin time 4 hours     Changes pod every 2 days.     DM COMPLICATIONS: nephropathy and peripheral neuropathy    SIGNIFICANT DIABETES MED HISTORY:   DKA on Invokana in 2/2015   Switched from Novolog 70/30 to toujeo/novolog ~ January 2018     SELF MONITORING BLOOD GLUCOSE: Checks blood glucose at home 2.7x/day. Average glucose 218, ranges 120 to 362.     HYPOGLYCEMIC EPISODES: feels symptoms starting at BG in the 120s - got dizzy.      CURRENT DIET: eats 2 meals/day. Rarely eats 3 meals/day. Usually eats dinner every night.  Loves Taco Tuesday and has chips with salsa, 5 tacos, no more margaritas however.  "Tuesdays are my bad days."     Breakfast is either banana, egg/masterson/cheese biscuit.     CURRENT EXERCISE: none d/t back pain     SOCIAL: his friend who has DM lives with him.       /72 (BP Location: Left arm, Patient Position: Sitting, BP Method: Large (Automatic))   Pulse 97   Ht 5' 5" (1.651 m)   Wt 94 kg (207 lb 3.7 oz)   BMI 34.49 kg/m²       ROS:   CONSTITUTIONAL: Appetite good, + fatigue  MS: + chronic back and neck pain       PHYSICAL EXAM:  GENERAL: Well developed, well nourished. No acute distress.   PSYCH: AAOx3, appropriate mood and affect, conversant, well-groomed. Judgement and insight good.   NEURO: Cranial nerves grossly intact. Speech clear, no tremor.   CHEST: Respirations even and unlabored.   FEET: No skin breakdown.       Hemoglobin A1C   Date Value Ref Range Status   10/15/2018 9.5 (H) 4.0 - 5.6 % Final     Comment:     ADA Screening Guidelines:  5.7-6.4%  Consistent with prediabetes  >or=6.5%  Consistent with diabetes  High levels of fetal hemoglobin interfere with the HbA1C  assay. Heterozygous hemoglobin variants (HbS, HgC, etc)do  not significantly interfere with this assay.   However, presence of multiple variants may affect accuracy.     08/08/2018 9.4 (H) 4.0 - " 5.6 % Final     Comment:     ADA Screening Guidelines:  5.7-6.4%  Consistent with prediabetes  >or=6.5%  Consistent with diabetes  High levels of fetal hemoglobin interfere with the HbA1C  assay. Heterozygous hemoglobin variants (HbS, HgC, etc)do  not significantly interfere with this assay.   However, presence of multiple variants may affect accuracy.     05/10/2018 9.1 (H) 4.0 - 5.6 % Final     Comment:     According to ADA guidelines, hemoglobin A1c <7.0% represents  optimal control in non-pregnant diabetic patients. Different  metrics may apply to specific patient populations.   Standards of Medical Care in Diabetes-2016.  For the purpose of screening for the presence of diabetes:  <5.7%     Consistent with the absence of diabetes  5.7-6.4%  Consistent with increasing risk for diabetes   (prediabetes)  >or=6.5%  Consistent with diabetes  Currently, no consensus exists for use of hemoglobin A1c  for diagnosis of diabetes for children.  This Hemoglobin A1c assay has significant interference with fetal   hemoglobin   (HbF). The results are invalid for patients with abnormal amounts of   HbF,   including those with known Hereditary Persistence   of Fetal Hemoglobin. Heterozygous hemoglobin variants (HbAS, HbAC,   HbAD, HbAE, HbA2) do not significantly interfere with this assay;   however, presence of multiple variants in a sample may impact the %   interference.             Chemistry        Component Value Date/Time     10/15/2018 0944    K 4.9 10/15/2018 0944     10/15/2018 0944    CO2 25 10/15/2018 0944    BUN 22 (H) 10/15/2018 0944    CREATININE 1.1 10/15/2018 0944     (H) 10/15/2018 0944        Component Value Date/Time    CALCIUM 9.4 10/15/2018 0944    ALKPHOS 94 10/15/2018 0944    AST 30 10/15/2018 0944    ALT 49 (H) 10/15/2018 0944    BILITOT 0.6 10/15/2018 0944    ESTGFRAFRICA >60.0 10/15/2018 0944    EGFRNONAA >60.0 10/15/2018 0944          Lab Results   Component Value Date    LDLCALC 41  01/10/2018        Ref. Range 1/10/2018 08:21   Cholesterol Latest Ref Range: 100 - 199 mg/dL 141   HDL Latest Ref Range: >39 mg/dL 34 (L)   Triglycerides Latest Ref Range: 0 - 149 mg/dL 329 (H)   LDL Calculated Latest Ref Range: 0 - 99 mg/dL 41   VLDL   Cholesterol Say Latest Ref Range: 5 - 40 mg/dL 66 (H)       Lab Results   Component Value Date    MICALBCREAT 4.8 08/08/2018           STANDARDS of CARE:        ASA:               Last eye exam: 4/2018      ASSESSMENT and PLAN:    A1C GOAL: < 7 %     1. Diabetes mellitus type 2, uncontrolled, with complications  Increase basal rate by 20% to 2.4 units/hr.   Increase carb ratio from 4 to 3.   Decrease active insulin time for 4 to 3 hours.     Test blood sugar 4x/day.   Please remember to bolus before each meal and snack.     Return to clinic in 3 months with labs prior.       Hemoglobin A1c    Lipid panel   2. Hypertriglyceridemia  Lipid panel   3. NPDR (nonproliferative diabetic retinopathy)  Improve glycemic control.      4. Essential hypertension  controlled       Spent 40 minutes with patient with >50% time spent in counseling, as noted in # 1.           Orders Placed This Encounter   Procedures    Hemoglobin A1c     Standing Status:   Future     Standing Expiration Date:   1/11/2020    Lipid panel     Standing Status:   Future     Standing Expiration Date:   11/12/2019        Follow-up in about 3 months (around 2/12/2019).

## 2018-11-16 ENCOUNTER — TELEPHONE (OUTPATIENT)
Dept: ENDOCRINOLOGY | Facility: CLINIC | Age: 58
End: 2018-11-16

## 2018-11-16 NOTE — TELEPHONE ENCOUNTER
----- Message from Nellie Mackey sent at 11/16/2018 11:58 AM CST -----  Contact: self [phone]  Pt is calling to speak with JESSICA regarding his diabetic medical supplies

## 2018-11-16 NOTE — TELEPHONE ENCOUNTER
----- Message from Bettie Tavarez sent at 11/16/2018 10:19 AM CST -----  Contact: Self   Pt is asking to speak with office to please call diabetes management supply, he needs a pod order approved. He is currently out of pods and Diabetes Management told him they have submitted everything but it has not been completed. Please call at [phone].

## 2018-11-16 NOTE — TELEPHONE ENCOUNTER
Called patient who stated that he is having difficulty with [facility] giving him his Omnipod supplies. Informed patient they never sent over a request. Patient stated he will call his insurance company to see if there is another DME company he can get his supplies through because he is not happy. Will contact patient if a request for supplies is not received by Mon 11/19/18. Patient verbalized understanding.

## 2018-11-19 NOTE — TELEPHONE ENCOUNTER
Patient has decided to go with [facility] for Ominpod supplies. Received request, will fax to [facility] today.

## 2018-11-20 ENCOUNTER — TELEPHONE (OUTPATIENT)
Dept: ENDOCRINOLOGY | Facility: CLINIC | Age: 58
End: 2018-11-20

## 2018-11-20 NOTE — TELEPHONE ENCOUNTER
----- Message from Misty Cueto sent at 11/20/2018 10:10 AM CST -----  Contact: Self   Patient returned your call. He also says he need to speak with yo about getting supplies ordered. Please call at [phone]

## 2018-11-20 NOTE — TELEPHONE ENCOUNTER
Called patient back and informed him that request for Ominpod supplies was received from [facility] and documents were faxed to them on 11/19/18. Informed patient to follow up with them. Patient verbalized understanding.

## 2018-11-24 NOTE — PROGRESS NOTES
Routine Office Visit    Patient Name: Cortes Schroeder    : 1960  MRN: [id number]    Subjective:  Cortes is a 58 y.o. male who presents today for:    1. Arm pain  Patient presenting today with increased bilateral upper extremity pain.  He states that he has stenosis of cervical spine and finds that the pain from that is increasing.  He is uncontrolled type 2 DM as well.  He states that he was told in the past that he would eventually need surgery.  No sudden weakness at this time.  No burning in hands.  He states that he gets spasming of muscles in arms L>R.      Past Medical History  Past Medical History:   Diagnosis Date    Anxiety 2012    Back pain 2012    Cataract     Chronic pain syndrome 2016    Diabetes type 2, controlled 2016    Diabetic retinopathy     DM (diabetes mellitus) 2012    DM (diabetes mellitus), type 2, uncontrolled 2013    Essential hypertension 2016    Gastroesophageal reflux disease without esophagitis 2016    Hyperlipidemia 2012    Insomnia 2014    Neuropathy 2013       Past Surgical History  Past Surgical History:   Procedure Laterality Date    BACK SURGERY      Lumbar Spine       Family History  Family History   Problem Relation Age of Onset    Cataracts Father     Hypertension Father     Parkinsonism Father     Alzheimer's disease Father     Migraines Mother     Hypertension Mother     Heart attack Mother     Amblyopia Neg Hx     Blindness Neg Hx     Glaucoma Neg Hx     Macular degeneration Neg Hx     Retinal detachment Neg Hx     Strabismus Neg Hx        Social History  Social History     Socioeconomic History    Marital status:      Spouse name: Not on file    Number of children: Not on file    Years of education: Not on file    Highest education level: Not on file   Social Needs    Financial resource strain: Not on file    Food insecurity - worry: Not on file    Food insecurity  - inability: Not on file    Transportation needs - medical: Not on file    Transportation needs - non-medical: Not on file   Occupational History    Not on file   Tobacco Use    Smoking status: Never Smoker    Smokeless tobacco: Never Used   Substance and Sexual Activity    Alcohol use: Yes     Alcohol/week: 0.6 oz     Types: 1 Glasses of wine per week     Comment: occasionally    Drug use: No    Sexual activity: Not Currently     Partners: Male     Birth control/protection: Condom     Comment: 10/2/17    Other Topics Concern    Not on file   Social History Narrative    Not on file       Current Medications  Current Outpatient Medications on File Prior to Visit   Medication Sig Dispense Refill    blood sugar diagnostic (FREESTYLE INSULINX TEST STRIPS) Strp 1 strip by Misc.(Non-Drug; Combo Route) route 4 (four) times daily before meals and nightly. 200 strip 8    clotrimazole-betamethasone 1-0.05% (LOTRISONE) cream Apply topically 2 (two) times daily. 15 g 5    cyanocobalamin (VITAMIN B-12) 100 MCG tablet Take 100 mcg by mouth once daily.      DULoxetine (CYMBALTA) 60 MG capsule TAKE 1 CAPSULE(60 MG) BY MOUTH EVERY DAY 30 capsule 90    emtricitabine-tenofovir 200-300 mg (TRUVADA) 200-300 mg Tab Take 1 tablet by mouth once daily. 30 tablet 2    fish oil-omega-3 fatty acids 300-1,000 mg capsule Take by mouth 2 (two) times daily.      gabapentin (NEURONTIN) 100 MG capsule TAKE 1 TO 2 CAPSULES(100  MG) BY MOUTH EVERY EVENING 60 capsule 11    insulin lispro (HUMALOG U-100 INSULIN) 100 unit/mL injection  UNITS PER DAY WITH AUTOMATED INSULIN PUMP AS DIRECTED BY ENDOCRINOLOGY 30 mL 0    losartan (COZAAR) 50 MG tablet TAKE 1 TABLET(50 MG) BY MOUTH EVERY DAY 90 tablet 3    magnesium oxide-Mg AA chelate (MG-PLUS-PROTEIN) 133 mg Tab Take 500 mg by mouth every evening.       metFORMIN (GLUCOPHAGE) 1000 MG tablet TAKE 1 TABLET(1000 MG) BY MOUTH TWICE DAILY WITH MEALS 180 tablet 0     "nortriptyline (PAMELOR) 50 MG capsule Take 1 capsule (50 mg total) by mouth nightly. 30 capsule 2    pravastatin (PRAVACHOL) 80 MG tablet TAKE 1 TABLET(80 MG) BY MOUTH EVERY DAY 90 tablet 3    PROMETHAZINE/DEXTROMETHORPHAN (PROMETHAZINE-DM ORAL) Take by mouth as needed.      PROPYLENE GLYCOL//PF (SYSTANE, PF, OPHT) Apply to eye 4 (four) times daily.      SITagliptin (JANUVIA) 100 MG Tab Take 1 tablet (100 mg total) by mouth once daily. 90 tablet 3    subcutaneous insulin pump (OMNIPOD INSULIN MANAGEMENT MISC) by Misc.(Non-Drug; Combo Route) route.      vitamin D 1000 units Tab Take 185 mg by mouth 2 (two) times daily.       aspirin (ECOTRIN) 81 MG EC tablet Take 1 tablet (81 mg total) by mouth once daily.  0     No current facility-administered medications on file prior to visit.        Allergies   Review of patient's allergies indicates:   Allergen Reactions    Invokana [canagliflozin] Anaphylaxis    Percocet [oxycodone-acetaminophen] Nausea Only and Hallucinations    Biaxin [clarithromycin]     Hydrocodone Other (See Comments)     Dizzy/nausea/hallucinations    Sulfa (sulfonamide antibiotics) Nausea Only and Rash       Review of Systems (Pertinent positives)  Review of Systems   HENT: Positive for hearing loss.    Eyes: Negative for discharge.   Respiratory: Negative for wheezing.    Cardiovascular: Negative for chest pain and palpitations.   Gastrointestinal: Negative for blood in stool, constipation, diarrhea and vomiting.   Genitourinary: Negative for hematuria and urgency.   Musculoskeletal: Positive for myalgias and neck pain.   Neurological: Positive for weakness and headaches.   Endo/Heme/Allergies: Negative for polydipsia.         /72 (BP Location: Right arm, Patient Position: Sitting, BP Method: Large (Manual))   Pulse 103   Temp 98.4 °F (36.9 °C) (Oral)   Resp 18   Ht 5' 5" (1.651 m)   Wt 93.5 kg (206 lb 2.1 oz)   SpO2 96%   BMI 34.30 kg/m²     GENERAL APPEARANCE: in no " apparent distress and well developed and well nourished  HEENT: PERRL, EOMI, Sclera clear, anicteric, Oropharynx clear, no lesions, Neck supple with midline trachea  NECK: normal, supple, no adenopathy, thyroid normal in size  RESPIRATORY: appears well, vitals normal, no respiratory distress, acyanotic, normal RR, chest clear, no wheezing, crepitations, rhonchi, normal symmetric air entry  HEART: regular rate and rhythm, S1, S2 normal, no murmur, click, rub or gallop.    ABDOMEN: abdomen is soft without tenderness, no masses, no hernias, no organomegaly, no rebound, no guarding. Suprapubic tenderness absent. No CVA tenderness.  MS:  Pain on palpation of bilateral upper extremity triceps; no decreased sensation  SKIN: no rashes, no wounds, no other lesions  PSYCH: Alert, oriented x 3, thought content appropriate, speech normal, pleasant and cooperative, good eye contact, well groomed    Assessment/Plan:  Cortes Schroeder is a 58 y.o. male who presents today for :    Cortes was seen today for arm pain.    Diagnoses and all orders for this visit:    Cervical stenosis of spine  -     Ambulatory referral to Neurosurgery    HIV exposure      1.  Refer to neurosurgery for eval  2.  Continue all meds as presribed  3.  Encouraged him to monitor blood sugars better  4.  Continue Prep and follow up as scheduled      Don Artis MD

## 2018-12-07 DIAGNOSIS — Z20.6 EXPOSURE TO HIV: ICD-10-CM

## 2018-12-07 RX ORDER — EMTRICITABINE AND TENOFOVIR DISOPROXIL FUMARATE 200; 300 MG/1; MG/1
TABLET, FILM COATED ORAL
Qty: 30 TABLET | Refills: 0 | Status: SHIPPED | OUTPATIENT
Start: 2018-12-07 | End: 2019-01-21 | Stop reason: SDUPTHER

## 2018-12-07 RX ORDER — INSULIN LISPRO 100 [IU]/ML
INJECTION, SOLUTION INTRAVENOUS; SUBCUTANEOUS
Qty: 30 ML | Refills: 0 | Status: SHIPPED | OUTPATIENT
Start: 2018-12-07 | End: 2019-01-03 | Stop reason: SDUPTHER

## 2018-12-20 ENCOUNTER — OFFICE VISIT (OUTPATIENT)
Dept: NEUROSURGERY | Facility: CLINIC | Age: 58
End: 2018-12-20
Payer: COMMERCIAL

## 2018-12-20 VITALS
WEIGHT: 208.31 LBS | HEIGHT: 65 IN | DIASTOLIC BLOOD PRESSURE: 73 MMHG | HEART RATE: 104 BPM | SYSTOLIC BLOOD PRESSURE: 114 MMHG | TEMPERATURE: 98 F | BODY MASS INDEX: 34.71 KG/M2

## 2018-12-20 DIAGNOSIS — M47.812 CERVICAL SPONDYLOSIS WITHOUT MYELOPATHY: ICD-10-CM

## 2018-12-20 DIAGNOSIS — M54.12 RIGHT CERVICAL RADICULOPATHY: Primary | ICD-10-CM

## 2018-12-20 DIAGNOSIS — M54.50 CHRONIC BILATERAL LOW BACK PAIN WITHOUT SCIATICA: ICD-10-CM

## 2018-12-20 DIAGNOSIS — M48.02 NEUROFORAMINAL STENOSIS OF CERVICAL SPINE: ICD-10-CM

## 2018-12-20 DIAGNOSIS — G89.29 CHRONIC BILATERAL LOW BACK PAIN WITHOUT SCIATICA: ICD-10-CM

## 2018-12-20 PROCEDURE — 3078F DIAST BP <80 MM HG: CPT | Mod: CPTII,S$GLB,, | Performed by: NEUROLOGICAL SURGERY

## 2018-12-20 PROCEDURE — 3008F BODY MASS INDEX DOCD: CPT | Mod: CPTII,S$GLB,, | Performed by: NEUROLOGICAL SURGERY

## 2018-12-20 PROCEDURE — 99214 OFFICE O/P EST MOD 30 MIN: CPT | Mod: S$GLB,,, | Performed by: NEUROLOGICAL SURGERY

## 2018-12-20 PROCEDURE — 99999 PR PBB SHADOW E&M-EST. PATIENT-LVL III: CPT | Mod: PBBFAC,,, | Performed by: NEUROLOGICAL SURGERY

## 2018-12-20 PROCEDURE — 3074F SYST BP LT 130 MM HG: CPT | Mod: CPTII,S$GLB,, | Performed by: NEUROLOGICAL SURGERY

## 2018-12-20 NOTE — LETTER
December 26, 2018      Don Artis MD  [address]  [place] [address]           [facility]  [address] [facility] [address]  [place] [address]  Phone: [phone]  Fax: [phone]          Patient: Cortes Schroeder   MR Number: [id number]   YOB: 1960   Date of Visit: 12/20/2018       Dear Dr. Don HUGGINS Page:    Thank you for referring Cortes Schroeder to me for evaluation. Attached you will find relevant portions of my assessment and plan of care.    If you have questions, please do not hesitate to call me. I look forward to following Cortes Schroeder along with you.    Sincerely,    Wale Cabral, DO    Enclosure  CC:  No Recipients    If you would like to receive this communication electronically, please contact [email or web address] or [phone] to request more information on [facility] Link access.    For providers and/or their staff who would like to refer a patient to [facility], please contact us through our one-stop-shop provider referral line, [facility] , at [phone].    If you feel you have received this communication in error or would no longer like to receive these types of communications, please e-mail [email or web address]

## 2018-12-26 PROBLEM — M54.12 RIGHT CERVICAL RADICULOPATHY: Status: ACTIVE | Noted: 2018-12-26

## 2018-12-26 NOTE — PROGRESS NOTES
"CHIEF COMPLAINT:  Chief Complaint   Patient presents with    Neck Pain     right side    Extremity Pain     right arm and right leg    Cramping     right leg    Headache     towards the front right side of head    Lumbar Spine Pain (L-Spine)     both sides but mainly left side/feels like a knot in the back; previous back surgery in 2003       HPI:  Cortes Schroeder is a 58 y.o.  male with below listed PMH, who is referred for evaluation neck and right arm pain.  Posterior neck pain radiates to RUE down the side to back of his hand.  Whole hand will feel numb.  Unable to hold >10 lbs without dropping but denies clumsiness.  He has gotten a massage every 2 weeks without significant improvements.    He also has R LE pain, wing calf.  R calf has muscle contractions and comes on suddenly (typically at night in sleep) and lasts approx 30 mins.  Occas pain down back of leg "like sciatica."    PT 2x/wk no help  No injections    2003 had lower back fxs and surgery        Review of patient's allergies indicates:   Allergen Reactions    Invokana [canagliflozin] Anaphylaxis    Percocet [oxycodone-acetaminophen] Nausea Only and Hallucinations    Biaxin [clarithromycin]     Hydrocodone Other (See Comments)     Dizzy/nausea/hallucinations    Sulfa (sulfonamide antibiotics) Nausea Only and Rash       Past Medical History:   Diagnosis Date    Anxiety 12/18/2012    Back pain 12/18/2012    Cataract     Chronic pain syndrome 4/24/2016    Diabetes type 2, controlled 2/20/2016    Diabetic retinopathy     DM (diabetes mellitus) 12/18/2012    DM (diabetes mellitus), type 2, uncontrolled 11/16/2013    Essential hypertension 2/20/2016    Gastroesophageal reflux disease without esophagitis 2/20/2016    Hyperlipidemia 12/18/2012    Insomnia 8/7/2014    Neuropathy 11/16/2013     Past Surgical History:   Procedure Laterality Date    BACK SURGERY  2003    Lumbar Spine     Family History   Problem Relation Age of Onset    " Cataracts Father     Hypertension Father     Parkinsonism Father     Alzheimer's disease Father     Migraines Mother     Hypertension Mother     Heart attack Mother     Amblyopia Neg Hx     Blindness Neg Hx     Glaucoma Neg Hx     Macular degeneration Neg Hx     Retinal detachment Neg Hx     Strabismus Neg Hx      Social History     Tobacco Use    Smoking status: Never Smoker    Smokeless tobacco: Never Used   Substance Use Topics    Alcohol use: Yes     Alcohol/week: 0.6 oz     Types: 1 Glasses of wine per week     Comment: occasionally    Drug use: No        Review of Systems   Constitutional: Negative.    Respiratory: Negative for cough and shortness of breath.    Cardiovascular: Negative for chest pain, palpitations, claudication and leg swelling.   Gastrointestinal: Negative for abdominal pain, constipation and diarrhea.   Genitourinary: Negative for flank pain, frequency and urgency.   Musculoskeletal: Positive for back pain.   Skin: Negative.    Neurological: Positive for sensory change (R arm/hand), focal weakness (R arm) and headaches (R frontal HA). Negative for dizziness, tingling, tremors, speech change, seizures and loss of consciousness.   Psychiatric/Behavioral: Negative.        OBJECTIVE:   Vital Signs:  Temp: 98.1 °F (36.7 °C) (12/20/18 1528)  Pulse: 104 (12/20/18 1528)  BP: 114/73 (12/20/18 1528)    Physical Exam:  Constitutional: Patient sitting comfortably in chair. Appears well developed and well nourished.  Skin: Exposed areas are intact without abnormal markings, rashes or other lesions.  HEENT: Normocephalic. Normal conjunctivae.  Cardiovascular: Normal rate and regular rhythm.  Respiratory: Chest wall rises and falls symmetrically, without signs of respiratory distress.  Abdomen: Soft and non-tender.  Extremities: Warm and without edema. Calves supple, non-tender.  Psych/Behavior: Normal affect.    Neurological:    Mental status: Alert and oriented. Conversational and  appropriate.       Cranial Nerves: VFF to confrontation. PERRL. EOMI without nystagmus. Facial STLT normal and symmetric. Strong, symmetric muscles of mastication. Facial strength full and symmetric. Hearing equal bilaterally to finger rub. Palate and uvula rise and fall normally in midline. Shoulder shrug 5/5 strength. Tongue midline.     Motor:    Upper:  Deltoids Triceps Biceps WE WF  FA    R 5/5 5/5 5/5 5/5 5/5 5/5 5/5    L 5/5 5/5 5/5 5/5 5/5 5/5 5/5      Lower:  HF KE KF DF PF EHL    R 5/5 5/5 5/5 5/5 5/5 5/5    L 5/5 5/5 5/5 5/5 5/5 5/5     Sensory: Intact sensation to light touch and pinprick in all extremities.     Reflexes:      DTR: 0-1+ knees and biceps symmetrically.     Lazar's: Negative.     Babinski's: Negative.     Clonus: Negative.    Cerebellar: Finger-to-nose and rapid alternating movements normal.     Gait:  Stable, fluid.    Spine:    Posture: Head well aligned over pelvis and shoulders in front and side views.  No focal or global spinal deformity visible on inspection.     Cervical:      ROM: Full with flexion, extension, lateral rotation and ear-to-shoulder bend.      Midline TTP: Negative.     Spurling's test: Negative.     Lhermitte's: Negative.    Thoracic:     Midline TTP: Negative    Lumbar:     Midline TTP: Negative     Straight Leg Test: Negative     Crossed Straight Leg Test: Negative    Other:     SI joint TTP: Negative.     Tenderness with external/internal hip rotation: Negative.    Diagnostic Results:  All imaging was independently reviewed by me.    MRI L spine, dated 8/13/18:  1. DDD at L4-5 with moderate bilateral NF stenosis  2. No significant central stenosis  3. Good lordosis    MRI C spine, dated 12/12/17:  1. Moderate-severe CS at C4-5 and C5-6  2. Good lordosis    Flex/Ex X-ray L spine, dated 8/2/18:  1. No dynamic motion    Flex/Ex X-ray C spine, dated 8/2/18:  1. No dynamic motion    ASSESSMENT/PLAN:     Problem List Items Addressed This Visit        Neuro     Cervical spondylosis without myelopathy    Relevant Orders    Ambulatory Referral to Pain Clinic    Right cervical radiculopathy - Primary    Relevant Orders    Ambulatory Referral to Pain Clinic       Orthopedic    Chronic bilateral low back pain without sciatica    Relevant Orders    Ambulatory Referral to Pain Clinic    Neuroforaminal stenosis of cervical spine    Relevant Orders    Ambulatory Referral to Pain Clinic          VISIT SUMMARY:  1. Posterior neck and right arm pain in setting of moderate-severe C4-5 and C5-6 stenosis.  Would rec inhections by pain medicine. He wishes to remain on [employer] for future treatments.  2. RLE leg and calf pain. He has severe DDD of L4-5 with moderate-severe NF stenosis but pain is not classic radicular.  Likely myofascial.      Will refer to pain medicine and continue conservative measures for now.      PATIENT EDUCATION:  More than half the clinic visit was spent showing with patient the pertinent findings on imaging and educating the patient about natural history of the pathology.   We discussed options for treatment as well as the risks and benefits of each option.  All questions were answered.     The patient understands and agrees with the following plan of care.    - Referral to pain medicine  - RTC in 8 weeks                .

## 2019-01-03 RX ORDER — INSULIN LISPRO 100 [IU]/ML
INJECTION, SOLUTION INTRAVENOUS; SUBCUTANEOUS
Qty: 30 ML | Refills: 0 | Status: SHIPPED | OUTPATIENT
Start: 2019-01-03 | End: 2019-02-14 | Stop reason: ALTCHOICE

## 2019-01-07 ENCOUNTER — TELEPHONE (OUTPATIENT)
Dept: PAIN MEDICINE | Facility: CLINIC | Age: 59
End: 2019-01-07

## 2019-01-07 NOTE — TELEPHONE ENCOUNTER
Reminded patient of Pain Management appointment scheduled for tomorrow at 11:15 am with Dr. Sinclair- verbal confirmation received.  Location information also provided.

## 2019-01-08 ENCOUNTER — OFFICE VISIT (OUTPATIENT)
Dept: PAIN MEDICINE | Facility: CLINIC | Age: 59
End: 2019-01-08
Payer: COMMERCIAL

## 2019-01-08 VITALS
OXYGEN SATURATION: 98 % | SYSTOLIC BLOOD PRESSURE: 143 MMHG | HEIGHT: 65 IN | DIASTOLIC BLOOD PRESSURE: 81 MMHG | WEIGHT: 211 LBS | RESPIRATION RATE: 18 BRPM | HEART RATE: 98 BPM | BODY MASS INDEX: 35.16 KG/M2

## 2019-01-08 DIAGNOSIS — M50.30 DDD (DEGENERATIVE DISC DISEASE), CERVICAL: Primary | ICD-10-CM

## 2019-01-08 DIAGNOSIS — M54.12 CERVICAL RADICULOPATHY: ICD-10-CM

## 2019-01-08 DIAGNOSIS — M47.22 OSTEOARTHRITIS OF SPINE WITH RADICULOPATHY, CERVICAL REGION: ICD-10-CM

## 2019-01-08 DIAGNOSIS — M62.81 MUSCLE WEAKNESS OF RIGHT UPPER EXTREMITY: ICD-10-CM

## 2019-01-08 PROCEDURE — 99244 OFF/OP CNSLTJ NEW/EST MOD 40: CPT | Mod: S$GLB,,, | Performed by: PAIN MEDICINE

## 2019-01-08 PROCEDURE — 99999 PR PBB SHADOW E&M-EST. PATIENT-LVL III: CPT | Mod: PBBFAC,,, | Performed by: PAIN MEDICINE

## 2019-01-08 PROCEDURE — 99244 PR OFFICE CONSULTATION,LEVEL IV: ICD-10-PCS | Mod: S$GLB,,, | Performed by: PAIN MEDICINE

## 2019-01-08 PROCEDURE — 99999 PR PBB SHADOW E&M-EST. PATIENT-LVL III: ICD-10-PCS | Mod: PBBFAC,,, | Performed by: PAIN MEDICINE

## 2019-01-08 NOTE — PROGRESS NOTES
Mr. Schroeder will be scheduled for a C7-T1 YVONNE on 01/16/2019.  Reviewed the pre-procedure instructions listed below with him- copy also provided.  Instructed patient to notify clinic if he begins feeling ill, runs a fever, is prescribed antibiotics, and/or has any outpatient procedures within the two weeks leading up to this procedure.  Instructed patient to report to [facility], 2nd Floor Endoscopy Registration Desk.  All questions answered- patient verbalized understanding.  Request to hold ASA x 7 days will be sent to Dr. Acosta.    Day of Procedure  - Ensure you have obtained an arrival time from the Pain Management Staff  o Procedure Area will call 1-3 days in advance with your arrival time.  Please check any voicemails received.  o If you arrive past your scheduled procedure time, you may be asked to reschedule your procedure.  - Ensure you have a  with you to remain present throughout your procedure for your safety.  o If you arrive without a responsible adult to stay with you and drive you home, you may be asked to reschedule your procedure.  - Take all of your prescribed medications (exceptions noted below) with a small amount of water  - This is NOT a fasting procedure, you may have a light meal before coming.  - Wear comfortable clothing (loose fitting pants).  - You may wear glasses, dentures, contact lenses, and/or hearing aids. Please remove all jewelry and metal hairpins.  - Notify the nurse during the intake process if you are allergic to any medications, if you are diabetic, or if you are not feeling well  - Contact the Pain Management Clinic with the following:  o A fever greater than 100° (degrees)   o Feel ill, have any type of infection, or are taking antibiotics now or within the two (2) weeks leading up to this procedure  o Have any outpatient procedures within the two (2) weeks leading up to this procedure (colonoscopy, major dental work, etc.)    IF you are taking blood  thinners: Only upon receiving clearance and notification from the Pain Management Department  7 Days Prior to Your Procedure:  - Stop taking Plavix/Clopridogrel, Effient/Prasugel, ASA  5 Days Prior to Your Procedure:  - Stop taking Coumadin/Warfarin.  An INR may be drawn prior to your procedure.  If labs are required, you will need to arrive earlier than your scheduled arrival time.  - Stop taking Pradaxa/Dabigatra,  Brilinta/ Ticagrelor  3 Days Prior to Your Procedure:  - Stop taking Xarelto/Rivaroxaban, Eliquis/Apixaban, Aggrenox/Dipyridamole, Reopro/Abciximab

## 2019-01-08 NOTE — PROGRESS NOTES
Subjective:     Patient ID: Cortes Schroeder is a 59 y.o. male    Chief Complaint: Back Pain (patient experiences back pain - patient dx w/ chronic neck pain, chronic bilateral low back pain w/o sciatica - patient previously had back surgery ( possible fusion) - treatment w/ medication, PT ( not effective) )      Referred by: Wale Cabral DO      HPI:    Initial Encounter (1/8/19):  Cortes Schroeder is a 59 y.o. male who presents today with chronic neck pain R>L and bilateral upper extremity pain, numbness and weakness. This has been present for at least one year. The pain is located in the cervical region and radiates to the right arm, forearm and hand. There is associated numbness in this area. He reports that his right upper extremity is also weak. He denies any weakness in the left upper extremity. He denies b/b dysfunction. The pain is constant and worsened with activity.    This pain is described in detail below.    Physical Therapy: Yes. Not helpful    Non-pharmacologic Treatment: rest helps         · TENS?: no    Pain Medications:         · Currently taking: Gabapentin, Cymbalta, nortriptyline    · Has tried in the past:      · Has not tried: Opioids, NSAIDs, Tylenol, Muscle relaxants, topical creams    Blood thinners: ASA 81mg daily    Interventional Therapies: None    Relevant Surgeries: None    Affecting sleep? Yes    Affecting daily activities? yes    Depressive symptoms? yes          · SI/HI? No    Work status: Employed    Pain Scores:    Best:       2/10  Worst:   10/10  Usually:   3/10  Today:    3/10    Review of Systems   Constitutional: Negative for activity change, appetite change, chills, fatigue, fever and unexpected weight change.   HENT: Negative for hearing loss.    Eyes: Negative for visual disturbance.   Respiratory: Negative for chest tightness and shortness of breath.    Cardiovascular: Negative for chest pain.   Gastrointestinal: Negative for abdominal pain, constipation, diarrhea,  nausea and vomiting.   Genitourinary: Negative for difficulty urinating.   Musculoskeletal: Positive for arthralgias, back pain, myalgias, neck pain and neck stiffness. Negative for gait problem.   Skin: Negative for rash.   Neurological: Positive for weakness and numbness. Negative for dizziness, light-headedness and headaches.   Psychiatric/Behavioral: Positive for sleep disturbance. Negative for hallucinations and suicidal ideas. The patient is not nervous/anxious.        Past Medical History:   Diagnosis Date    Anxiety 12/18/2012    Back pain 12/18/2012    Cataract     Chronic pain syndrome 4/24/2016    Diabetes type 2, controlled 2/20/2016    Diabetic retinopathy     DM (diabetes mellitus) 12/18/2012    DM (diabetes mellitus), type 2, uncontrolled 11/16/2013    Essential hypertension 2/20/2016    Gastroesophageal reflux disease without esophagitis 2/20/2016    Hyperlipidemia 12/18/2012    Insomnia 8/7/2014    Neuropathy 11/16/2013       Past Surgical History:   Procedure Laterality Date    BACK SURGERY  2003    Lumbar Spine       Social History     Socioeconomic History    Marital status:      Spouse name: Not on file    Number of children: Not on file    Years of education: Not on file    Highest education level: Not on file   Social Needs    Financial resource strain: Not on file    Food insecurity - worry: Not on file    Food insecurity - inability: Not on file    Transportation needs - medical: Not on file    Transportation needs - non-medical: Not on file   Occupational History    Not on file   Tobacco Use    Smoking status: Never Smoker    Smokeless tobacco: Never Used   Substance and Sexual Activity    Alcohol use: Yes     Alcohol/week: 0.6 oz     Types: 1 Glasses of wine per week     Comment: occasionally    Drug use: No    Sexual activity: Not Currently     Partners: Male     Birth control/protection: Condom     Comment: 10/2/17    Other Topics Concern    Not  on file   Social History Narrative    Not on file       Review of patient's allergies indicates:   Allergen Reactions    Invokana [canagliflozin] Anaphylaxis    Percocet [oxycodone-acetaminophen] Nausea Only and Hallucinations    Biaxin [clarithromycin]     Hydrocodone Other (See Comments)     Dizzy/nausea/hallucinations    Sulfa (sulfonamide antibiotics) Nausea Only and Rash       Current Outpatient Medications on File Prior to Visit   Medication Sig Dispense Refill    blood sugar diagnostic (FREESTYLE INSULINX TEST STRIPS) Strp 1 strip by Misc.(Non-Drug; Combo Route) route 4 (four) times daily before meals and nightly. 200 strip 8    clotrimazole-betamethasone 1-0.05% (LOTRISONE) cream Apply topically 2 (two) times daily. 15 g 5    cyanocobalamin (VITAMIN B-12) 100 MCG tablet Take 100 mcg by mouth once daily.      DULoxetine (CYMBALTA) 60 MG capsule TAKE 1 CAPSULE(60 MG) BY MOUTH EVERY DAY 30 capsule 90    fish oil-omega-3 fatty acids 300-1,000 mg capsule Take by mouth 2 (two) times daily.      gabapentin (NEURONTIN) 100 MG capsule TAKE 1 TO 2 CAPSULES(100  MG) BY MOUTH EVERY EVENING 60 capsule 11    insulin lispro (HUMALOG U-100 INSULIN) 100 unit/mL injection  UNITS PER DAY WITH AUTOMATED INSULIN PUMP AS DIRECTED BY ENDOCRINOLOGY 30 mL 0    losartan (COZAAR) 50 MG tablet TAKE 1 TABLET(50 MG) BY MOUTH EVERY DAY 90 tablet 3    magnesium oxide-Mg AA chelate (MG-PLUS-PROTEIN) 133 mg Tab Take 500 mg by mouth every evening.       metFORMIN (GLUCOPHAGE) 1000 MG tablet TAKE 1 TABLET(1000 MG) BY MOUTH TWICE DAILY WITH MEALS 180 tablet 0    nortriptyline (PAMELOR) 50 MG capsule Take 1 capsule (50 mg total) by mouth nightly. 30 capsule 2    pravastatin (PRAVACHOL) 80 MG tablet TAKE 1 TABLET(80 MG) BY MOUTH EVERY DAY 90 tablet 3    PROMETHAZINE/DEXTROMETHORPHAN (PROMETHAZINE-DM ORAL) Take by mouth as needed.      PROPYLENE GLYCOL//PF (SYSTANE, PF, OPHT) Apply to eye 4 (four) times  "daily.      SITagliptin (JANUVIA) 100 MG Tab Take 1 tablet (100 mg total) by mouth once daily. 90 tablet 3    subcutaneous insulin pump (OMNIPOD INSULIN MANAGEMENT MISC) by Misc.(Non-Drug; Combo Route) route.      TRUVADA 200-300 mg Tab TAKE 1 TABLET BY MOUTH EVERY DAY 30 tablet 0    vitamin D 1000 units Tab Take 185 mg by mouth 2 (two) times daily.       aspirin (ECOTRIN) 81 MG EC tablet Take 1 tablet (81 mg total) by mouth once daily.  0     No current facility-administered medications on file prior to visit.        Objective:      BP (!) 143/81   Pulse 98   Resp 18   Ht 5' 5" (1.651 m)   Wt 95.7 kg (211 lb)   SpO2 98%   BMI 35.11 kg/m²     Exam:  GEN:  Well developed, well nourished.  No acute distress.  No pain behavior.  HEENT:  No trauma.  Mucous membranes moist.  Nares patent bilaterally.  PSYCH: Normal affect. Thought content appropriate.  CHEST:  Breathing symmetric.  No audible wheezing.  ABD: Soft, non-distended.  SKIN:  Warm, pink, dry.  No rash on exposed areas.    EXT:  No cyanosis, clubbing, or edema.  No color change or changes in nail or hair growth.  NEURO/MUSCULOSKELETAL:  Fully alert, oriented, and appropriate. Speech normal tasha. No cranial nerve deficits.   Gait: Normal.  5/5 motor strength throughout upper extremities.   Sensory: No sensory deficit in the upper extremities.   Reflexes: 2+ and symmetric throughout.  Negative Lazar's bilaterally.  C-Spine:  Full ROM with pain on extension. Positive facet loading bilaterally.  Negative Spurling's bilaterally.    No TTP over cervical facet joints, cervical paraspinal muscles, shoulders, elbows or hands.              Imaging:  Narrative     History: Cervicalgia.    Procedure: Sagittal T1, T2, STIR sequences and axial T2-weighted sequence and a gradient echo sequence is performed.    Comparison study none    Findings:    There is normal cervical lordosis. No marrow replacing lesions or fractures noted. Craniovertebral alignment is " within normal limits. There is no Chiari type malformations. The signal intensity within the cervical cord is within normal limits.    C2-3: No disc herniations or spinal stenosis.    C3-4: There is disc dehydration. No significant disc herniations or spinal stenosis. There is at least moderate right foraminal stenosis and mild left foraminal stenosis. No cord compression.    C4-5: Approximately 2 mm posterior subluxation of C4 on C5 associated with disc dehydration and a posterior disc bulge and osteophyte complex. Effacement of the anterior subarachnoid space with slight dorsal displacement and compression of the cord is noted (image 10 series 7 and 6). There is severe bilateral foraminal stenosis. At least mild central canal spinal stenosis.    C5-6: Severe disc degeneration with disc space narrowing and global anterior and posterior osteophyte and disc bulge complex. There is posterior displacement of the cervical cord with slight flattening of the cervical cord. There is central canal spinal stenosis. There is severe right foraminal stenosis and moderate left foraminal stenosis.    C6-7: Mild broad-based left paracentral disc herniation and osteophyte complex without cord compression or spinal stenosis. There is a moderate to severe left foraminal stenosis and moderate right foraminal stenosis.    C7-T1: No significant central canal spinal stenosis or disc herniations or foraminal stenosis.   Impression         1. Multilevel degenerative disc disease as above. There is spinal stenosis and foraminal stenotic changes more pronounced at C4-5 and C5-6 disc spaces. See details at each disc space level above.      Electronically signed by: DELL TELLEZ MD  Date: 12/12/17  Time: 14:27          Assessment:       Encounter Diagnoses   Name Primary?    DDD (degenerative disc disease), cervical Yes    Osteoarthritis of spine with radiculopathy, cervical region     Cervical radiculopathy     Muscle weakness of right  upper extremity          Plan:       Cortes was seen today for back pain.    Diagnoses and all orders for this visit:    DDD (degenerative disc disease), cervical  -     Case request GI: Injection, Steroid, Epidural Cervical    Osteoarthritis of spine with radiculopathy, cervical region    Cervical radiculopathy  -     Case request GI: Injection, Steroid, Epidural Cervical    Muscle weakness of right upper extremity        Cortes Schroeder is a 59 y.o. male with chronic bilateral R>L neck and upper extremity pain. Appears to be related to cervical stenosis noted on MRI. May have radicular pain. Also may have facet mediated pain. .    1. Pertinent imaging studies reviewed by me. Imaging results were discussed with patient.  2. Schedule for C7-T1 ILESI.  3. RTC 2 weeks after procedure. May consider cervical MBBs. Will also discuss low back pain the future.

## 2019-01-08 NOTE — LETTER
January 8, 2019      Wale Cabral, DO  [address] [facility] [address]  [address]  [facility] [address]           [facility]  [address]  [facility] [address]  Phone: [phone]  Fax: [phone]          Patient: Cortes Schroeder   MR Number: [id number]   YOB: 1960   Date of Visit: 1/8/2019       Dear Dr. Wale Cabral:    Thank you for referring Cortes Schroeder to me for evaluation. Attached you will find relevant portions of my assessment and plan of care.    If you have questions, please do not hesitate to call me. I look forward to following Cortes Schroeder along with you.    Sincerely,    Andrew Sinclair Jr., MD    Enclosure  CC:  No Recipients    If you would like to receive this communication electronically, please contact [email or web address] or [phone] to request more information on [employer] Link access.    For providers and/or their staff who would like to refer a patient to [facility], please contact us through our one-stop-shop provider referral line, [facility], at [phone].    If you feel you have received this communication in error or would no longer like to receive these types of communications, please e-mail [email or web address]

## 2019-01-09 ENCOUNTER — PATIENT MESSAGE (OUTPATIENT)
Dept: ADMINISTRATIVE | Facility: HOSPITAL | Age: 59
End: 2019-01-09

## 2019-01-09 ENCOUNTER — TELEPHONE (OUTPATIENT)
Dept: ADMINISTRATIVE | Facility: HOSPITAL | Age: 59
End: 2019-01-09

## 2019-01-11 ENCOUNTER — TELEPHONE (OUTPATIENT)
Dept: PAIN MEDICINE | Facility: CLINIC | Age: 59
End: 2019-01-11

## 2019-01-11 NOTE — TELEPHONE ENCOUNTER
----- Message from Danii Gavin LPN sent at 1/10/2019  4:19 PM CST -----  Regarding: FW: Anticoagulant Clearance      ----- Message -----  From: Hira Acosta MD  Sent: 1/10/2019   4:19 PM  To: Danii Gavin LPN  Subject: RE: Anticoagulant Clearance                      Yes it is ok to hold asa and resume 48 hours after procedure  ----- Message -----  From: Danii Gavin LPN  Sent: 1/8/2019  11:52 AM  To: Hira Acosta MD  Subject: FW: Anticoagulant Clearance                          ----- Message -----  From: Amber Cook RN  Sent: 1/8/2019  11:48 AM  To: Dave Iniguez Staff  Subject: Anticoagulant Clearance                          Dr. Acosta,    Mr. Schroeder is scheduled to have a C7-T1 YVONNE on 01/16/2019.  Dr. Sinclair is requesting that the patient stop taking ASA seven days prior to this procedure.  Please indicate whether or not he is able to stop taking the medication listed above.  Feel free to contact the clinic with any questions or concerns you may have.      Thank you in advance for your time and expertise,    AMBIKA Jernigan, RN

## 2019-01-11 NOTE — TELEPHONE ENCOUNTER
Informed Mr. Schroeder that we received clearance from Dr. Acosta that he is able to hold ASA for 7 days prior to Pain Management procedure scheduled on 01/16/2019.  Patient stated he has not taken ASA since LOV on 1/8/19.

## 2019-01-12 ENCOUNTER — LAB VISIT (OUTPATIENT)
Dept: LAB | Facility: HOSPITAL | Age: 59
End: 2019-01-12
Attending: FAMILY MEDICINE
Payer: COMMERCIAL

## 2019-01-12 DIAGNOSIS — E78.1 HYPERTRIGLYCERIDEMIA: ICD-10-CM

## 2019-01-12 LAB
CHOLEST SERPL-MCNC: 148 MG/DL
CHOLEST/HDLC SERPL: 3.3 {RATIO}
ESTIMATED AVG GLUCOSE: 283 MG/DL
HBA1C MFR BLD HPLC: 11.5 %
HDLC SERPL-MCNC: 45 MG/DL
HDLC SERPL: 30.4 %
LDLC SERPL CALC-MCNC: 56.8 MG/DL
NONHDLC SERPL-MCNC: 103 MG/DL
TRIGL SERPL-MCNC: 231 MG/DL

## 2019-01-12 PROCEDURE — 36415 COLL VENOUS BLD VENIPUNCTURE: CPT | Mod: PO

## 2019-01-12 PROCEDURE — 80061 LIPID PANEL: CPT

## 2019-01-12 PROCEDURE — 83036 HEMOGLOBIN GLYCOSYLATED A1C: CPT

## 2019-01-16 ENCOUNTER — HOSPITAL ENCOUNTER (OUTPATIENT)
Dept: RADIOLOGY | Facility: HOSPITAL | Age: 59
Discharge: HOME | End: 2019-01-16
Attending: PAIN MEDICINE | Admitting: PAIN MEDICINE
Payer: COMMERCIAL

## 2019-01-16 ENCOUNTER — HOSPITAL ENCOUNTER (OUTPATIENT)
Facility: HOSPITAL | Age: 59
Discharge: HOME | End: 2019-01-16
Attending: PAIN MEDICINE | Admitting: PAIN MEDICINE
Payer: COMMERCIAL

## 2019-01-16 VITALS
RESPIRATION RATE: 20 BRPM | OXYGEN SATURATION: 96 % | DIASTOLIC BLOOD PRESSURE: 82 MMHG | HEART RATE: 100 BPM | SYSTOLIC BLOOD PRESSURE: 149 MMHG

## 2019-01-16 DIAGNOSIS — M50.30 DDD (DEGENERATIVE DISC DISEASE), CERVICAL: ICD-10-CM

## 2019-01-16 DIAGNOSIS — M54.12 RIGHT CERVICAL RADICULOPATHY: Primary | ICD-10-CM

## 2019-01-16 DIAGNOSIS — M54.12 CERVICAL RADICULOPATHY: ICD-10-CM

## 2019-01-16 PROCEDURE — 62321 NJX INTERLAMINAR CRV/THRC: CPT | Mod: ,,, | Performed by: PAIN MEDICINE

## 2019-01-16 PROCEDURE — 25000003 PHARM REV CODE 250

## 2019-01-16 PROCEDURE — 25500020 PHARM REV CODE 255: Performed by: PAIN MEDICINE

## 2019-01-16 PROCEDURE — 63600175 PHARM REV CODE 636 W HCPCS: Performed by: PAIN MEDICINE

## 2019-01-16 PROCEDURE — 62320 NJX INTERLAMINAR CRV/THRC: CPT | Performed by: PAIN MEDICINE

## 2019-01-16 PROCEDURE — 62321 PR INJ CERV/THORAC, W/GUIDANCE: ICD-10-PCS | Mod: ,,, | Performed by: PAIN MEDICINE

## 2019-01-16 PROCEDURE — 62321 NJX INTERLAMINAR CRV/THRC: CPT | Performed by: PAIN MEDICINE

## 2019-01-16 PROCEDURE — 25000003 PHARM REV CODE 250: Performed by: PAIN MEDICINE

## 2019-01-16 RX ORDER — LIDOCAINE HYDROCHLORIDE 20 MG/ML
INJECTION, SOLUTION INFILTRATION; PERINEURAL
Status: DISCONTINUED
Start: 2019-01-16 | End: 2019-01-16 | Stop reason: HOSPADM

## 2019-01-16 RX ORDER — DEXAMETHASONE SODIUM PHOSPHATE 10 MG/ML
10 INJECTION INTRAMUSCULAR; INTRAVENOUS ONCE
Status: COMPLETED | OUTPATIENT
Start: 2019-01-16 | End: 2019-01-16

## 2019-01-16 RX ORDER — ALPRAZOLAM 0.5 MG/1
1 TABLET, ORALLY DISINTEGRATING ORAL ONCE AS NEEDED
Status: DISCONTINUED | OUTPATIENT
Start: 2019-01-16 | End: 2019-01-16 | Stop reason: HOSPADM

## 2019-01-16 RX ORDER — DEXAMETHASONE SODIUM PHOSPHATE 10 MG/ML
INJECTION INTRAMUSCULAR; INTRAVENOUS
Status: DISCONTINUED
Start: 2019-01-16 | End: 2019-01-16 | Stop reason: HOSPADM

## 2019-01-16 RX ORDER — LIDOCAINE HYDROCHLORIDE 10 MG/ML
1 INJECTION, SOLUTION EPIDURAL; INFILTRATION; INTRACAUDAL; PERINEURAL ONCE
Status: COMPLETED | OUTPATIENT
Start: 2019-01-16 | End: 2019-01-16

## 2019-01-16 RX ORDER — LIDOCAINE HYDROCHLORIDE 10 MG/ML
INJECTION, SOLUTION EPIDURAL; INFILTRATION; INTRACAUDAL; PERINEURAL
Status: DISCONTINUED
Start: 2019-01-16 | End: 2019-01-16 | Stop reason: HOSPADM

## 2019-01-16 RX ORDER — ALPRAZOLAM 0.5 MG/1
TABLET, ORALLY DISINTEGRATING ORAL
Status: COMPLETED
Start: 2019-01-16 | End: 2019-01-16

## 2019-01-16 RX ORDER — LIDOCAINE HYDROCHLORIDE 20 MG/ML
10 INJECTION, SOLUTION INFILTRATION; PERINEURAL ONCE
Status: COMPLETED | OUTPATIENT
Start: 2019-01-16 | End: 2019-01-16

## 2019-01-16 RX ADMIN — ALPRAZOLAM 1 MG: 0.5 TABLET, ORALLY DISINTEGRATING ORAL at 12:01

## 2019-01-16 NOTE — OP NOTE
Cervical Interlaminar Epidural Steroid Injection under fluoroscopic guidance.  Time-out taken to identify patient and procedure side prior to starting the procedure.   I attest that I have reviewed the patient's home medications prior to the procedure and no contraindication have been identified. I re-evaluated the patient after the patient was positioned for the procedure in the procedure room immediately before the procedural time-out. The vital signs are current and represent the current state of the patient which has not significantly changed since the preprocedure assessment.                                                              Date of Service: 01/16/2019    PCP: Hira Acosta MD    Referring Physician:    Procedure: C7-T1 cervical interlaminar epidural steroid injection under fluoroscopy.    Reasons for procedure: DDD (degenerative disc disease), cervical [M50.30]  Cervical radiculopathy [M54.12]    Physician: Andrew Sinclair MD  ASSISTANTS: None    Medications injected:  Preservative-free dexamethasone 10mg and Xylocaine 1% MPF 1ml.  This was followed by a slow injection of 4 mL sterile, preservative-free normal saline.    Local anesthetic used: Xylocaine 2% 9ml with Sodium Bicarbonate 1ml.     Sedation Medications: None    Complications:  none.    Estimated blood loss: none.    Technique:  With the patient laying in a prone position with the neck in a flexed forward position, the area was prepped and draped in the usual sterile fashion using ChloraPrep and a fenestrated drape.  The area was determined under fluoroscopic guidance.  Local anesthetic was given using a 27-gauge needle by raising a wheal and going down to the osseous elements of the cervical spine.  A 3.5 inch 20-gauge Touhy needle was introduced under fluoroscopic guidance to meet the lamina of T1.  The needle was then hinged under the lamina then advanced using loss of resistance technique.  Once the tip of the needle was in  the desired position, the contrast dye Omnipaque was injected to determine placement and no vascular runoff.  Digital subtraction was employed to confirm that there is no vascular runoff.  The steroid was then injected slowly followed by a slow injection of the 4 mL of the sterile preservative-free normal saline.  The patient tolerated the procedure well.    Pain before the procedure: 3/10    Pain after the procedure: 2/10    The patient was monitored after the procedure and was given post-procedure and discharge instructions to follow at home. The patient was discharged in a stable condition

## 2019-01-16 NOTE — DISCHARGE SUMMARY
Discharge Diagnosis:DDD (degenerative disc disease), cervical [M50.30]  Cervical radiculopathy [M54.12]  Condition on Discharge: Stable.  Diet on Discharge: Same as before.  Activity: as per instruction sheet.  Discharge to: Home with a responsible adult.  Follow up: as per Discharge instructions

## 2019-01-21 ENCOUNTER — OFFICE VISIT (OUTPATIENT)
Dept: FAMILY MEDICINE | Facility: CLINIC | Age: 59
End: 2019-01-21
Payer: COMMERCIAL

## 2019-01-21 ENCOUNTER — LAB VISIT (OUTPATIENT)
Dept: LAB | Facility: HOSPITAL | Age: 59
End: 2019-01-21
Attending: FAMILY MEDICINE
Payer: COMMERCIAL

## 2019-01-21 VITALS
HEART RATE: 102 BPM | DIASTOLIC BLOOD PRESSURE: 86 MMHG | OXYGEN SATURATION: 97 % | WEIGHT: 209.44 LBS | RESPIRATION RATE: 14 BRPM | BODY MASS INDEX: 34.89 KG/M2 | HEIGHT: 65 IN | SYSTOLIC BLOOD PRESSURE: 134 MMHG | TEMPERATURE: 98 F

## 2019-01-21 VITALS
TEMPERATURE: 98 F | HEIGHT: 65 IN | OXYGEN SATURATION: 96 % | WEIGHT: 207.44 LBS | DIASTOLIC BLOOD PRESSURE: 78 MMHG | RESPIRATION RATE: 16 BRPM | SYSTOLIC BLOOD PRESSURE: 124 MMHG | BODY MASS INDEX: 34.56 KG/M2 | HEART RATE: 109 BPM

## 2019-01-21 DIAGNOSIS — E11.21 DIABETIC NEPHROPATHY ASSOCIATED WITH TYPE 2 DIABETES MELLITUS: ICD-10-CM

## 2019-01-21 DIAGNOSIS — Z20.6 EXPOSURE TO HIV: Primary | ICD-10-CM

## 2019-01-21 DIAGNOSIS — F41.9 ANXIETY: ICD-10-CM

## 2019-01-21 DIAGNOSIS — Z20.6 HIV EXPOSURE: ICD-10-CM

## 2019-01-21 DIAGNOSIS — R05.9 COUGH: ICD-10-CM

## 2019-01-21 DIAGNOSIS — Z20.6 EXPOSURE TO HIV: ICD-10-CM

## 2019-01-21 DIAGNOSIS — I10 HYPERTENSION, WELL CONTROLLED: Primary | ICD-10-CM

## 2019-01-21 LAB
ALBUMIN SERPL BCP-MCNC: 3.6 G/DL
ALP SERPL-CCNC: 110 U/L
ALT SERPL W/O P-5'-P-CCNC: 42 U/L
ANION GAP SERPL CALC-SCNC: 7 MMOL/L
AST SERPL-CCNC: 26 U/L
BILIRUB SERPL-MCNC: 0.5 MG/DL
BUN SERPL-MCNC: 17 MG/DL
CALCIUM SERPL-MCNC: 9.3 MG/DL
CHLORIDE SERPL-SCNC: 99 MMOL/L
CO2 SERPL-SCNC: 29 MMOL/L
CREAT SERPL-MCNC: 1.1 MG/DL
EST. GFR  (AFRICAN AMERICAN): >60 ML/MIN/1.73 M^2
EST. GFR  (NON AFRICAN AMERICAN): >60 ML/MIN/1.73 M^2
GLUCOSE SERPL-MCNC: 149 MG/DL
POTASSIUM SERPL-SCNC: 4.6 MMOL/L
PROT SERPL-MCNC: 7 G/DL
SODIUM SERPL-SCNC: 135 MMOL/L

## 2019-01-21 PROCEDURE — 99999 PR PBB SHADOW E&M-EST. PATIENT-LVL III: ICD-10-PCS | Mod: PBBFAC,,, | Performed by: FAMILY MEDICINE

## 2019-01-21 PROCEDURE — 86703 HIV-1/HIV-2 1 RESULT ANTBDY: CPT

## 2019-01-21 PROCEDURE — 99999 PR PBB SHADOW E&M-EST. PATIENT-LVL V: CPT | Mod: PBBFAC,,, | Performed by: FAMILY MEDICINE

## 2019-01-21 PROCEDURE — 80053 COMPREHEN METABOLIC PANEL: CPT

## 2019-01-21 PROCEDURE — 99214 PR OFFICE/OUTPT VISIT, EST, LEVL IV, 30-39 MIN: ICD-10-PCS | Mod: S$GLB,,, | Performed by: FAMILY MEDICINE

## 2019-01-21 PROCEDURE — 99999 PR PBB SHADOW E&M-EST. PATIENT-LVL V: ICD-10-PCS | Mod: PBBFAC,,, | Performed by: FAMILY MEDICINE

## 2019-01-21 PROCEDURE — 99214 OFFICE O/P EST MOD 30 MIN: CPT | Mod: S$GLB,,, | Performed by: FAMILY MEDICINE

## 2019-01-21 PROCEDURE — 36415 COLL VENOUS BLD VENIPUNCTURE: CPT | Mod: PN

## 2019-01-21 PROCEDURE — 99999 PR PBB SHADOW E&M-EST. PATIENT-LVL III: CPT | Mod: PBBFAC,,, | Performed by: FAMILY MEDICINE

## 2019-01-21 RX ORDER — MOMETASONE FUROATE 50 UG/1
2 SPRAY, METERED NASAL DAILY
Qty: 17 G | Refills: 4 | Status: SHIPPED | OUTPATIENT
Start: 2019-01-21 | End: 2019-02-14

## 2019-01-21 RX ORDER — EMTRICITABINE AND TENOFOVIR DISOPROXIL FUMARATE 200; 300 MG/1; MG/1
1 TABLET, FILM COATED ORAL DAILY
Qty: 30 TABLET | Refills: 2 | Status: SHIPPED | OUTPATIENT
Start: 2019-01-21 | End: 2019-05-10 | Stop reason: SDUPTHER

## 2019-01-21 RX ORDER — PROMETHAZINE HYDROCHLORIDE AND DEXTROMETHORPHAN HYDROBROMIDE 6.25; 15 MG/5ML; MG/5ML
SYRUP ORAL
Qty: 240 ML | Refills: 3 | Status: SHIPPED | OUTPATIENT
Start: 2019-01-21 | End: 2020-02-14 | Stop reason: SDUPTHER

## 2019-01-21 NOTE — PROGRESS NOTES
Routine Office Visit    Patient Name: Cortes Schroeder    : 1960  MRN: [id number]    Subjective:  Cortes is a 59 y.o. male who presents today for:    1. PrEP  Patient presenting today for follow up of PrEP.  He has been taking medication as prescribed.  He has not had any side effects related to the medication.  He is not currently sexually active.  When he is, its the same partner.  They occasionally use condoms.  He states that blood sugars have been doing better.   No rashes, penile discharge, night sweats, or unexplained weight loss    Past Medical History  Past Medical History:   Diagnosis Date    Anxiety 2012    Back pain 2012    Cataract     Chronic pain syndrome 2016    Diabetes type 2, controlled 2016    Diabetic retinopathy     DM (diabetes mellitus) 2012    DM (diabetes mellitus), type 2, uncontrolled 2013    Essential hypertension 2016    Gastroesophageal reflux disease without esophagitis 2016    Hyperlipidemia 2012    Insomnia 2014    Neuropathy 2013       Past Surgical History  Past Surgical History:   Procedure Laterality Date    BACK SURGERY      Lumbar Spine    Injection, Steroid, Epidural Cervical N/A 2019    Performed by Andrew Sinclair Jr., MD at [facility] ENDO       Family History  Family History   Problem Relation Age of Onset    Cataracts Father     Hypertension Father     Parkinsonism Father     Alzheimer's disease Father     Migraines Mother     Hypertension Mother     Heart attack Mother     Amblyopia Neg Hx     Blindness Neg Hx     Glaucoma Neg Hx     Macular degeneration Neg Hx     Retinal detachment Neg Hx     Strabismus Neg Hx        Social History  Social History     Socioeconomic History    Marital status:      Spouse name: Not on file    Number of children: Not on file    Years of education: Not on file    Highest education level: Not on file   Social Needs    Financial  resource strain: Not on file    Food insecurity - worry: Not on file    Food insecurity - inability: Not on file    Transportation needs - medical: Not on file    Transportation needs - non-medical: Not on file   Occupational History    Not on file   Tobacco Use    Smoking status: Never Smoker    Smokeless tobacco: Never Used   Substance and Sexual Activity    Alcohol use: Yes     Alcohol/week: 0.6 oz     Types: 1 Glasses of wine per week     Comment: occasionally    Drug use: No    Sexual activity: Not Currently     Partners: Male     Birth control/protection: Condom     Comment: 10/2/17    Other Topics Concern    Not on file   Social History Narrative    Not on file       Current Medications  Current Outpatient Medications on File Prior to Visit   Medication Sig Dispense Refill    aspirin (ECOTRIN) 81 MG EC tablet Take 1 tablet (81 mg total) by mouth once daily.  0    blood sugar diagnostic (FREESTYLE INSULINX TEST STRIPS) Strp 1 strip by Misc.(Non-Drug; Combo Route) route 4 (four) times daily before meals and nightly. 200 strip 8    clotrimazole-betamethasone 1-0.05% (LOTRISONE) cream Apply topically 2 (two) times daily. 15 g 5    cyanocobalamin (VITAMIN B-12) 100 MCG tablet Take 100 mcg by mouth once daily.      DULoxetine (CYMBALTA) 60 MG capsule TAKE 1 CAPSULE(60 MG) BY MOUTH EVERY DAY 30 capsule 90    fish oil-omega-3 fatty acids 300-1,000 mg capsule Take by mouth 2 (two) times daily.      gabapentin (NEURONTIN) 100 MG capsule TAKE 1 TO 2 CAPSULES(100  MG) BY MOUTH EVERY EVENING 60 capsule 11    insulin lispro (HUMALOG U-100 INSULIN) 100 unit/mL injection  UNITS PER DAY WITH AUTOMATED INSULIN PUMP AS DIRECTED BY ENDOCRINOLOGY 30 mL 0    losartan (COZAAR) 50 MG tablet TAKE 1 TABLET(50 MG) BY MOUTH EVERY DAY 90 tablet 3    magnesium oxide-Mg AA chelate (MG-PLUS-PROTEIN) 133 mg Tab Take 500 mg by mouth every evening.       metFORMIN (GLUCOPHAGE) 1000 MG tablet TAKE 1  TABLET(1000 MG) BY MOUTH TWICE DAILY WITH MEALS 180 tablet 0    mometasone (NASONEX) 50 mcg/actuation nasal spray 2 sprays by Nasal route once daily. 17 g 4    nortriptyline (PAMELOR) 50 MG capsule Take 1 capsule (50 mg total) by mouth nightly. 30 capsule 2    pravastatin (PRAVACHOL) 80 MG tablet TAKE 1 TABLET(80 MG) BY MOUTH EVERY DAY 90 tablet 3    PROPYLENE GLYCOL//PF (SYSTANE, PF, OPHT) Apply to eye 4 (four) times daily.      subcutaneous insulin pump (OMNIPOD INSULIN MANAGEMENT MISC) by Misc.(Non-Drug; Combo Route) route.      vitamin D 1000 units Tab Take 185 mg by mouth 2 (two) times daily.       [DISCONTINUED] TRUVADA 200-300 mg Tab TAKE 1 TABLET BY MOUTH EVERY DAY 30 tablet 0    promethazine-dextromethorphan (PROMETHAZINE-DM) 6.25-15 mg/5 mL Syrp Take 5ml qhs prn cough 240 mL 3    SITagliptin (JANUVIA) 100 MG Tab Take 1 tablet (100 mg total) by mouth once daily. 90 tablet 3    [DISCONTINUED] PROMETHAZINE/DEXTROMETHORPHAN (PROMETHAZINE-DM ORAL) Take by mouth as needed.       No current facility-administered medications on file prior to visit.        Allergies   Review of patient's allergies indicates:   Allergen Reactions    Invokana [canagliflozin] Anaphylaxis    Percocet [oxycodone-acetaminophen] Nausea Only and Hallucinations    Biaxin [clarithromycin]     Hydrocodone Other (See Comments)     Dizzy/nausea/hallucinations    Sulfa (sulfonamide antibiotics) Nausea Only and Rash       Review of Systems (Pertinent positives)  Review of Systems   HENT: Positive for hearing loss.    Eyes: Negative for discharge.   Respiratory: Negative for wheezing.    Cardiovascular: Negative for chest pain and palpitations.   Gastrointestinal: Negative for blood in stool, constipation, diarrhea and vomiting.   Genitourinary: Negative for hematuria and urgency.   Musculoskeletal: Positive for joint pain and neck pain.   Neurological: Negative for weakness and headaches.   Endo/Heme/Allergies: Negative for  "polydipsia.         /86 (BP Location: Left arm, Patient Position: Sitting, BP Method: Medium (Manual))   Pulse 102   Temp 98 °F (36.7 °C) (Oral)   Resp 14   Ht 5' 5" (1.651 m)   Wt 95 kg (209 lb 7 oz)   SpO2 97%   BMI 34.85 kg/m²     GENERAL APPEARANCE: in no apparent distress and well developed and well nourished  HEENT: PERRL, EOMI, Sclera clear, anicteric, Oropharynx clear, no lesions, Neck supple with midline trachea  NECK: normal, supple, no adenopathy, thyroid normal in size  RESPIRATORY: appears well, vitals normal, no respiratory distress, acyanotic, normal RR, chest clear, no wheezing, crepitations, rhonchi, normal symmetric air entry  HEART: regular rate and rhythm, S1, S2 normal, no murmur, click, rub or gallop.    ABDOMEN: abdomen is soft without tenderness, no masses, no hernias, no organomegaly, no rebound, no guarding. Suprapubic tenderness absent. No CVA tenderness.  SKIN: no rashes, no wounds, no other lesions  PSYCH: Alert, oriented x 3, thought content appropriate, speech normal, pleasant and cooperative, good eye contact, well groomed    Assessment/Plan:  Cortes Schroeder is a 59 y.o. male who presents today for :    Cortes was seen today for follow-up and medication refill.    Diagnoses and all orders for this visit:    Exposure to HIV  -     emtricitabine-tenofovir 200-300 mg (TRUVADA) 200-300 mg Tab; Take 1 tablet by mouth once daily.  -     Comprehensive metabolic panel; Future  -     HIV 1/2 Ag/Ab (4th Gen); Future    HIV exposure      1.  Truvada refilled  2.  Labs to be done today to evaluate HIV status and renal function  3.  Follow up 3 months or sooner if needed    Don Artis MD    Answers for HPI/ROS submitted by the patient on 1/15/2019   activity change: No  unexpected weight change: No  rhinorrhea: No  trouble swallowing: No  visual disturbance: No  chest tightness: No  polyuria: No  difficulty urinating: No  joint swelling: No  arthralgias: No  confusion: " No  dysphoric mood: No

## 2019-01-21 NOTE — PROGRESS NOTES
Subjective:       Patient ID: Cortes Schroeder is a 59 y.o. male.    Chief Complaint: Diabetes (3 months follow up ); Hypertension (3 months follow up ); and Anxiety (3 months follow up )    HPI    Anxiety- pt is doing well on duloxetine and nortriptyline. Pt is adherent. No side effects.     DM2 - Followed by [facility], A1c elevated. Part of this he attributes to issues with the PODS for his injector so he was without insulin for one month.    Htn - chronic - stable - his adherent with his medications.      Chronic neck pain - s/p injection from Dr Sinclair 1 week ago or so which has improved his pain significantly.    Chronic Low back pain - still persists. He is looking into getting injectiosn here next.       Cough - patient suffers from intermittent cough at night that generates difficulty sleeping.  Patient uses promethazine DM at the rate of about a bottle per year that helps when his cough is more severe.  He did not tolerate Flonase in the past as a treatment for allergic rhinitis for  Current Outpatient Medications on File Prior to Visit   Medication Sig Dispense Refill    aspirin (ECOTRIN) 81 MG EC tablet Take 1 tablet (81 mg total) by mouth once daily.  0    blood sugar diagnostic (FREESTYLE INSULINX TEST STRIPS) Strp 1 strip by Misc.(Non-Drug; Combo Route) route 4 (four) times daily before meals and nightly. 200 strip 8    clotrimazole-betamethasone 1-0.05% (LOTRISONE) cream Apply topically 2 (two) times daily. 15 g 5    cyanocobalamin (VITAMIN B-12) 100 MCG tablet Take 100 mcg by mouth once daily.      DULoxetine (CYMBALTA) 60 MG capsule TAKE 1 CAPSULE(60 MG) BY MOUTH EVERY DAY 30 capsule 90    fish oil-omega-3 fatty acids 300-1,000 mg capsule Take by mouth 2 (two) times daily.      gabapentin (NEURONTIN) 100 MG capsule TAKE 1 TO 2 CAPSULES(100  MG) BY MOUTH EVERY EVENING 60 capsule 11    insulin lispro (HUMALOG U-100 INSULIN) 100 unit/mL injection  UNITS PER DAY WITH AUTOMATED INSULIN  PUMP AS DIRECTED BY ENDOCRINOLOGY 30 mL 0    losartan (COZAAR) 50 MG tablet TAKE 1 TABLET(50 MG) BY MOUTH EVERY DAY 90 tablet 3    magnesium oxide-Mg AA chelate (MG-PLUS-PROTEIN) 133 mg Tab Take 500 mg by mouth every evening.       metFORMIN (GLUCOPHAGE) 1000 MG tablet TAKE 1 TABLET(1000 MG) BY MOUTH TWICE DAILY WITH MEALS 180 tablet 0    nortriptyline (PAMELOR) 50 MG capsule Take 1 capsule (50 mg total) by mouth nightly. 30 capsule 2    pravastatin (PRAVACHOL) 80 MG tablet TAKE 1 TABLET(80 MG) BY MOUTH EVERY DAY 90 tablet 3    PROPYLENE GLYCOL//PF (SYSTANE, PF, OPHT) Apply to eye 4 (four) times daily.      subcutaneous insulin pump (OMNIPOD INSULIN MANAGEMENT MISC) by Misc.(Non-Drug; Combo Route) route.      vitamin D 1000 units Tab Take 185 mg by mouth 2 (two) times daily.       [DISCONTINUED] TRUVADA 200-300 mg Tab TAKE 1 TABLET BY MOUTH EVERY DAY 30 tablet 0    SITagliptin (JANUVIA) 100 MG Tab Take 1 tablet (100 mg total) by mouth once daily. 90 tablet 3    [DISCONTINUED] PROMETHAZINE/DEXTROMETHORPHAN (PROMETHAZINE-DM ORAL) Take by mouth as needed.       No current facility-administered medications on file prior to visit.        Past Medical History:   Diagnosis Date    Anxiety 12/18/2012    Back pain 12/18/2012    Cataract     Chronic pain syndrome 4/24/2016    Diabetes type 2, controlled 2/20/2016    Diabetic retinopathy     DM (diabetes mellitus) 12/18/2012    DM (diabetes mellitus), type 2, uncontrolled 11/16/2013    Essential hypertension 2/20/2016    Gastroesophageal reflux disease without esophagitis 2/20/2016    Hyperlipidemia 12/18/2012    Insomnia 8/7/2014    Neuropathy 11/16/2013       Family History   Problem Relation Age of Onset    Cataracts Father     Hypertension Father     Parkinsonism Father     Alzheimer's disease Father     Migraines Mother     Hypertension Mother     Heart attack Mother     Amblyopia Neg Hx     Blindness Neg Hx     Glaucoma Neg Hx      Macular degeneration Neg Hx     Retinal detachment Neg Hx     Strabismus Neg Hx         reports that  has never smoked. he has never used smokeless tobacco. He reports that he drinks about 0.6 oz of alcohol per week. He reports that he does not use drugs.    Review of Systems   Constitutional: Negative for activity change and unexpected weight change.   HENT: Positive for hearing loss and rhinorrhea. Negative for trouble swallowing.    Eyes: Positive for visual disturbance. Negative for discharge.   Respiratory: Negative for chest tightness and wheezing.    Cardiovascular: Negative for chest pain and palpitations.   Gastrointestinal: Negative for blood in stool, constipation, diarrhea and vomiting.   Endocrine: Negative for polydipsia and polyuria.   Genitourinary: Negative for difficulty urinating, hematuria and urgency.   Musculoskeletal: Positive for neck pain. Negative for arthralgias and joint swelling.   Neurological: Positive for weakness. Negative for headaches.   Psychiatric/Behavioral: Positive for dysphoric mood. Negative for confusion.       Objective:     Vitals:    01/21/19 0842   BP: 124/78   Pulse: 109   Resp: 16   Temp: 98.4 °F (36.9 °C)        Physical Exam   Constitutional: He appears well-developed. No distress.   O   HENT:   Head: Normocephalic and atraumatic.   Eyes: Conjunctivae are normal. No scleral icterus.   Pulmonary/Chest: Effort normal.   Neurological: He is alert.   Psychiatric: He has a normal mood and affect. His behavior is normal.   Nursing note and vitals reviewed.      Assessment:       1. Hypertension, well controlled    2. Anxiety    3. Diabetic nephropathy associated with type 2 diabetes mellitus    4. Cough        Plan:       Cortes was seen today for diabetes, hypertension and anxiety.    Diagnoses and all orders for this visit:    Hypertension, well controlled  - Chronic - stable     Pt is doing well on current therapy. No side effects noted. Will continue current  therapy.    Anxiety  - Chronic - stable     Pt is doing well on current therapy. No side effects noted. Will continue current therapy.    Diabetic nephropathy associated with type 2 diabetes mellitus  - Chronic - WORSE    Patient will follow up with Endocrinology in voice his dissatisfaction with communication their office.  He was out of pods for his insulin pump on for 1 month.      Patient will reach out to me he encounter any issue with obtaining his medications again.    Cough  -     promethazine-dextromethorphan (PROMETHAZINE-DM) 6.25-15 mg/5 mL Syrp; Take 5ml qhs prn cough  -     mometasone (NASONEX) 50 mcg/actuation nasal spray; 2 sprays by Nasal route once daily.  Patient has a chronic that we call the effects and only intermittently.  He uses about 1 bottle of promethazine DM per year but I also advised him that night nightly mometasone may also help. Patient is willing to give this a try.          Follow-up in about 3 months (around 4/21/2019) for Anxiety, Hypertension, Diabetes Type 2.        Pt verbalized understanding and agreed with our plan.

## 2019-01-22 ENCOUNTER — TELEPHONE (OUTPATIENT)
Dept: FAMILY MEDICINE | Facility: CLINIC | Age: 59
End: 2019-01-22

## 2019-01-22 LAB — HIV 1+2 AB+HIV1 P24 AG SERPL QL IA: NEGATIVE

## 2019-01-22 NOTE — TELEPHONE ENCOUNTER
Attempted to complete prior auth for nasonex and received message stating too soon to fill medication at this time

## 2019-01-28 ENCOUNTER — TELEPHONE (OUTPATIENT)
Dept: PAIN MEDICINE | Facility: CLINIC | Age: 59
End: 2019-01-28

## 2019-01-28 NOTE — TELEPHONE ENCOUNTER
Reminded patient of Pain Management appointment scheduled for tomorrow at 2:45 pm with Dr. Sinclair- verbal confirmation received.  Location information also provided.

## 2019-01-29 ENCOUNTER — OFFICE VISIT (OUTPATIENT)
Dept: PAIN MEDICINE | Facility: CLINIC | Age: 59
End: 2019-01-29
Payer: COMMERCIAL

## 2019-01-29 ENCOUNTER — PATIENT MESSAGE (OUTPATIENT)
Dept: OPTOMETRY | Facility: CLINIC | Age: 59
End: 2019-01-29

## 2019-01-29 VITALS
OXYGEN SATURATION: 100 % | RESPIRATION RATE: 18 BRPM | HEIGHT: 65 IN | WEIGHT: 211 LBS | BODY MASS INDEX: 35.16 KG/M2 | HEART RATE: 86 BPM | DIASTOLIC BLOOD PRESSURE: 87 MMHG | SYSTOLIC BLOOD PRESSURE: 157 MMHG

## 2019-01-29 DIAGNOSIS — M50.30 DDD (DEGENERATIVE DISC DISEASE), CERVICAL: ICD-10-CM

## 2019-01-29 DIAGNOSIS — M47.816 LUMBAR SPONDYLOSIS: ICD-10-CM

## 2019-01-29 DIAGNOSIS — M54.16 LUMBAR RADICULOPATHY: ICD-10-CM

## 2019-01-29 DIAGNOSIS — M51.36 DDD (DEGENERATIVE DISC DISEASE), LUMBAR: Primary | ICD-10-CM

## 2019-01-29 DIAGNOSIS — M47.22 OSTEOARTHRITIS OF SPINE WITH RADICULOPATHY, CERVICAL REGION: ICD-10-CM

## 2019-01-29 DIAGNOSIS — M48.061 SPINAL STENOSIS OF LUMBAR REGION WITHOUT NEUROGENIC CLAUDICATION: ICD-10-CM

## 2019-01-29 PROCEDURE — 99999 PR PBB SHADOW E&M-EST. PATIENT-LVL III: CPT | Mod: PBBFAC,,, | Performed by: PAIN MEDICINE

## 2019-01-29 PROCEDURE — 99214 PR OFFICE/OUTPT VISIT, EST, LEVL IV, 30-39 MIN: ICD-10-PCS | Mod: S$GLB,,, | Performed by: PAIN MEDICINE

## 2019-01-29 PROCEDURE — 99214 OFFICE O/P EST MOD 30 MIN: CPT | Mod: S$GLB,,, | Performed by: PAIN MEDICINE

## 2019-01-29 PROCEDURE — 99999 PR PBB SHADOW E&M-EST. PATIENT-LVL III: ICD-10-PCS | Mod: PBBFAC,,, | Performed by: PAIN MEDICINE

## 2019-01-29 NOTE — H&P (VIEW-ONLY)
Subjective:     Patient ID: Cortes Schroeder is a 59 y.o. male    Chief Complaint: Follow-up (S/P C7-T1 YVONNE 01.16.19)      Referred by: No ref. provider found      HPI:    Interval History (1/29/19):  He returns today for follow up.  He reports that C7-T1 interlaminar epidural steroid injection has been helpful for the neck and upper extremity pain. He reports about 85% relief of the symptoms.  He is happy with this relief and does not feel as though any further interventions are needed for his neck.  However he does wish to discuss his chronic low back pain today.  He reports having chronic low back pain and is status post lumbar surgery.  The pain is located across the lower lumbar region more prominent on the right side.  The pain will radiate down the posterior right thigh and leg with associated numbness and tingling.  He reports intermittent weakness during activity but denies any focal weakness or bowel or bladder dysfunction.      Initial Encounter (1/8/19):  Cortes Schroeder is a 59 y.o. male who presents today with chronic neck pain R>L and bilateral upper extremity pain, numbness and weakness. This has been present for at least one year. The pain is located in the cervical region and radiates to the right arm, forearm and hand. There is associated numbness in this area. He reports that his right upper extremity is also weak. He denies any weakness in the left upper extremity. He denies b/b dysfunction. The pain is constant and worsened with activity.    This pain is described in detail below.    Physical Therapy: Yes. Not helpful    Non-pharmacologic Treatment: rest helps         · TENS?: no    Pain Medications:         · Currently taking: Gabapentin, Cymbalta, nortriptyline    · Has tried in the past:      · Has not tried: Opioids, NSAIDs, Tylenol, Muscle relaxants, topical creams    Blood thinners: ASA 81mg daily    Interventional Therapies:   1/16/19 - C7-T1 interlaminar epidural steroid injection -  85% relief noted    Relevant Surgeries:   Previous low back surgery no other details specified at this time    Affecting sleep? Yes    Affecting daily activities? yes    Depressive symptoms? yes          · SI/HI? No    Work status: Employed    Pain Scores:    Best:       1/10  Worst:     3/10  Usually:   1/10  Today:    1/10    Review of Systems   Constitutional: Negative for activity change, appetite change, chills, fatigue, fever and unexpected weight change.   HENT: Negative for hearing loss.    Eyes: Negative for visual disturbance.   Respiratory: Negative for chest tightness and shortness of breath.    Cardiovascular: Negative for chest pain.   Gastrointestinal: Negative for abdominal pain, constipation, diarrhea, nausea and vomiting.   Genitourinary: Negative for difficulty urinating.   Musculoskeletal: Positive for arthralgias, back pain, gait problem, myalgias, neck pain and neck stiffness.   Skin: Negative for rash.   Neurological: Positive for weakness and numbness. Negative for dizziness, light-headedness and headaches.   Psychiatric/Behavioral: Positive for sleep disturbance. Negative for hallucinations and suicidal ideas. The patient is not nervous/anxious.        Past Medical History:   Diagnosis Date    Anxiety 12/18/2012    Back pain 12/18/2012    Cataract     Chronic pain syndrome 4/24/2016    Diabetes type 2, controlled 2/20/2016    Diabetic retinopathy     DM (diabetes mellitus) 12/18/2012    DM (diabetes mellitus), type 2, uncontrolled 11/16/2013    Essential hypertension 2/20/2016    Gastroesophageal reflux disease without esophagitis 2/20/2016    Hyperlipidemia 12/18/2012    Insomnia 8/7/2014    Neuropathy 11/16/2013       Past Surgical History:   Procedure Laterality Date    BACK SURGERY  2003    Lumbar Spine    Injection, Steroid, Epidural Cervical N/A 1/16/2019    Performed by Andrew Sinclair Jr., MD at [facility] END[facility]       Social History     Socioeconomic History    Marital  status:      Spouse name: Not on file    Number of children: Not on file    Years of education: Not on file    Highest education level: Not on file   Social Needs    Financial resource strain: Not on file    Food insecurity - worry: Not on file    Food insecurity - inability: Not on file    Transportation needs - medical: Not on file    Transportation needs - non-medical: Not on file   Occupational History    Not on file   Tobacco Use    Smoking status: Never Smoker    Smokeless tobacco: Never Used   Substance and Sexual Activity    Alcohol use: Yes     Alcohol/week: 0.6 oz     Types: 1 Glasses of wine per week     Comment: occasionally    Drug use: No    Sexual activity: Not Currently     Partners: Male     Birth control/protection: Condom     Comment: 10/2/17    Other Topics Concern    Not on file   Social History Narrative    Not on file       Review of patient's allergies indicates:   Allergen Reactions    Invokana [canagliflozin] Anaphylaxis    Percocet [oxycodone-acetaminophen] Nausea Only and Hallucinations    Biaxin [clarithromycin]     Hydrocodone Other (See Comments)     Dizzy/nausea/hallucinations    Sulfa (sulfonamide antibiotics) Nausea Only and Rash       Current Outpatient Medications on File Prior to Visit   Medication Sig Dispense Refill    blood sugar diagnostic (FREESTYLE INSULINX TEST STRIPS) Strp 1 strip by Misc.(Non-Drug; Combo Route) route 4 (four) times daily before meals and nightly. 200 strip 8    clotrimazole-betamethasone 1-0.05% (LOTRISONE) cream Apply topically 2 (two) times daily. 15 g 5    cyanocobalamin (VITAMIN B-12) 100 MCG tablet Take 100 mcg by mouth once daily.      DULoxetine (CYMBALTA) 60 MG capsule TAKE 1 CAPSULE(60 MG) BY MOUTH EVERY DAY 30 capsule 90    emtricitabine-tenofovir 200-300 mg (TRUVADA) 200-300 mg Tab Take 1 tablet by mouth once daily. 30 tablet 2    fish oil-omega-3 fatty acids 300-1,000 mg capsule Take by mouth 2 (two)  "times daily.      gabapentin (NEURONTIN) 100 MG capsule TAKE 1 TO 2 CAPSULES(100  MG) BY MOUTH EVERY EVENING 60 capsule 11    insulin lispro (HUMALOG U-100 INSULIN) 100 unit/mL injection  UNITS PER DAY WITH AUTOMATED INSULIN PUMP AS DIRECTED BY ENDOCRINOLOGY 30 mL 0    losartan (COZAAR) 50 MG tablet TAKE 1 TABLET(50 MG) BY MOUTH EVERY DAY 90 tablet 3    magnesium oxide-Mg AA chelate (MG-PLUS-PROTEIN) 133 mg Tab Take 500 mg by mouth every evening.       metFORMIN (GLUCOPHAGE) 1000 MG tablet TAKE 1 TABLET(1000 MG) BY MOUTH TWICE DAILY WITH MEALS 180 tablet 0    mometasone (NASONEX) 50 mcg/actuation nasal spray 2 sprays by Nasal route once daily. 17 g 4    nortriptyline (PAMELOR) 50 MG capsule Take 1 capsule (50 mg total) by mouth nightly. 30 capsule 2    pravastatin (PRAVACHOL) 80 MG tablet TAKE 1 TABLET(80 MG) BY MOUTH EVERY DAY 90 tablet 3    promethazine-dextromethorphan (PROMETHAZINE-DM) 6.25-15 mg/5 mL Syrp Take 5ml qhs prn cough 240 mL 3    PROPYLENE GLYCOL//PF (SYSTANE, PF, OPHT) Apply to eye 4 (four) times daily.      SITagliptin (JANUVIA) 100 MG Tab Take 1 tablet (100 mg total) by mouth once daily. 90 tablet 3    subcutaneous insulin pump (OMNIPOD INSULIN MANAGEMENT MISC) by Misc.(Non-Drug; Combo Route) route.      vitamin D 1000 units Tab Take 185 mg by mouth 2 (two) times daily.       aspirin (ECOTRIN) 81 MG EC tablet Take 1 tablet (81 mg total) by mouth once daily.  0     No current facility-administered medications on file prior to visit.        Objective:      BP (!) 157/87   Pulse 86   Resp 18   Ht 5' 5" (1.651 m)   Wt 95.7 kg (211 lb)   SpO2 100%   BMI 35.11 kg/m²     Exam:  GEN:  Well developed, well nourished.  No acute distress.  Normal pain behavior.  HEENT:  No trauma.  Mucous membranes moist.  Nares patent bilaterally.  PSYCH: Normal affect. Thought content appropriate.  CHEST:  Breathing symmetric.  No audible wheezing.  ABD: Soft, non-distended.  SKIN:  " Warm, pink, dry.  No rash on exposed areas.    EXT:  No cyanosis, clubbing, or edema.  No color change or changes in nail or hair growth.  NEURO/MUSCULOSKELETAL:  Fully alert, oriented, and appropriate. Speech normal tasha. No cranial nerve deficits.   Gait:  Normal.  No trendelenburg sign bilaterally.   Motor Strength: 5/5 motor strength throughout lower extremities.   Sensory:  No sensory deficit in the lower extremities.   Reflexes:  2 + and symmetric throughout.  Downgoing Babinski's bilaterally.  No clonus or spasticity.  L-Spine:  Slightly limited ROM with pain on flexion and extension. Positive facet loading bilaterally.  Negative SLR bilaterally.    SI Joint/Hip:  Negative DANA bilaterally.  Negative FADIR bilaterally.  No TTP over lumbar paraspinals, bilateral SI joints, hips, piriformis muscles, or GTB.                Imaging:    Narrative     EXAMINATION:  MRI LUMBAR SPINE WITHOUT CONTRAST    CLINICAL HISTORY:  radicular leg pain; Arthrodesis status    TECHNIQUE:  Multiplanar, multisequence MR images were acquired from the thoracolumbar junction to the sacrum without the administration of contrast.    COMPARISON:  None.    FINDINGS:  Small portions of this exam are limited by patient motion.    The vertebral bodies maintain normal height, signal intensity and alignment.  There is multilevel disc desiccation with loss of disc height identified at L4-L5 and L5-S1.  This is most advanced at L4-L5.  The conus terminates at level L1.  Evaluation of the localizer images and structures surrounding the lumbar spine reveal no abnormalities.  There is mild central canal stenosis throughout likely related to congenitally shortened pedicles.    L1-L2: No significant disc or joint pathology.    L2-L3: Mild diffuse disc bulge present.  No facet arthropathy or ligamentum flavum hypertrophy.  The central canal and neural foramen are patent.    L3-L4: Mild diffuse disc bulge with very mild bilateral facet arthropathy  and some ligamentum flavum hypertrophy.  There is mild left neural foraminal stenosis.  The right neural foramen is patent.    L4-L5: There is a large diffuse disc bulge with moderate to severe bilateral facet arthropathy and no significant ligamentum flavum hypertrophy.  No significant central canal stenosis however, there is moderate bilateral neural foraminal stenosis.    L5-S1: Mild diffuse disc bulge.  Moderate to severe right and mild-to-moderate left facet arthropathy present.  No significant ligamentum flavum hypertrophy.  The neural foramen are patent.   Impression       Multilevel degenerative disc and joint disease is present.  Congenitally shortened pedicles likely contributes to the mild central canal stenosis throughout the lumbar spine.  No severe central canal or neural foraminal stenosis at any level.      Electronically signed by: Linda Sadler MD  Date: 08/13/2018  Time: 13:54         Narrative     History: Cervicalgia.    Procedure: Sagittal T1, T2, STIR sequences and axial T2-weighted sequence and a gradient echo sequence is performed.    Comparison study none    Findings:    There is normal cervical lordosis. No marrow replacing lesions or fractures noted. Craniovertebral alignment is within normal limits. There is no Chiari type malformations. The signal intensity within the cervical cord is within normal limits.    C2-3: No disc herniations or spinal stenosis.    C3-4: There is disc dehydration. No significant disc herniations or spinal stenosis. There is at least moderate right foraminal stenosis and mild left foraminal stenosis. No cord compression.    C4-5: Approximately 2 mm posterior subluxation of C4 on C5 associated with disc dehydration and a posterior disc bulge and osteophyte complex. Effacement of the anterior subarachnoid space with slight dorsal displacement and compression of the cord is noted (image 10 series 7 and 6). There is severe bilateral foraminal stenosis. At least  mild central canal spinal stenosis.    C5-6: Severe disc degeneration with disc space narrowing and global anterior and posterior osteophyte and disc bulge complex. There is posterior displacement of the cervical cord with slight flattening of the cervical cord. There is central canal spinal stenosis. There is severe right foraminal stenosis and moderate left foraminal stenosis.    C6-7: Mild broad-based left paracentral disc herniation and osteophyte complex without cord compression or spinal stenosis. There is a moderate to severe left foraminal stenosis and moderate right foraminal stenosis.    C7-T1: No significant central canal spinal stenosis or disc herniations or foraminal stenosis.   Impression         1. Multilevel degenerative disc disease as above. There is spinal stenosis and foraminal stenotic changes more pronounced at C4-5 and C5-6 disc spaces. See details at each disc space level above.      Electronically signed by: DELL TELLEZ MD  Date: 12/12/17  Time: 14:27          Assessment:       Encounter Diagnoses   Name Primary?    DDD (degenerative disc disease), lumbar Yes    Lumbar radiculopathy     DDD (degenerative disc disease), cervical     Osteoarthritis of spine with radiculopathy, cervical region     Spinal stenosis of lumbar region without neurogenic claudication     Lumbar spondylosis          Plan:       Cortes was seen today for follow-up.    Diagnoses and all orders for this visit:    DDD (degenerative disc disease), lumbar  -     Case request GI: Injection, Steroid, Epidural    Lumbar radiculopathy  -     Case request GI: Injection, Steroid, Epidural    DDD (degenerative disc disease), cervical    Osteoarthritis of spine with radiculopathy, cervical region    Spinal stenosis of lumbar region without neurogenic claudication    Lumbar spondylosis        Cortes Schroeder is a 59 y.o. male with chronic bilateral R>L neck and upper extremity pain. Appears to be related to cervical  stenosis noted on MRI. May have radicular pain. Very good relief following C7-T1 interlaminar epidural steroid injection. Also with chronic low back pain. Subjectively sounds most like right-sided lumbar radiculopathy, however limited evidence on imaging and physical exam.  Some indications of facet mediated pain though distribution pain is not as consistent with this diagnosis.    1. Pertinent imaging studies reviewed by me. Imaging results were discussed with patient.  2. Schedule for L4-5 interlaminar epidural steroid injection.  3. RTC 2 weeks after procedure. May consider lumbar MBBs.

## 2019-01-29 NOTE — PROGRESS NOTES
Mr. Schroeder will be scheduled for L4-5 YVONNE on 02/20/2019.  Reviewed the pre-procedure instructions listed below with him- copy also provided.  Instructed patient to notify clinic if he begins feeling ill, runs a fever, is prescribed antibiotics, and/or has any outpatient procedures within the two weeks leading up to this procedure.  Instructed patient to report to [facility], 2nd Floor Endoscopy Registration Desk.  All questions answered- patient verbalized understanding.  Clearance previously received to hold ASA x 7 days from Dr. Acosta.     Day of Procedure  - Ensure you have obtained an arrival time from the Pain Management Staff  o Procedure Area will call 1-3 days in advance with your arrival time.  Please check any voicemails received.  o If you arrive past your scheduled procedure time, you may be asked to reschedule your procedure.  - Ensure you have a  with you to remain present throughout your procedure for your safety.  o If you arrive without a responsible adult to stay with you and drive you home, you may be asked to reschedule your procedure.  - Take all of your prescribed medications (exceptions noted below) with a small amount of water  - This is NOT a fasting procedure, you may have a light meal before coming.  - Wear comfortable clothing (loose fitting pants).  - You may wear glasses, dentures, contact lenses, and/or hearing aids. Please remove all jewelry and metal hairpins.  - Notify the nurse during the intake process if you are allergic to any medications, if you are diabetic, or if you are not feeling well  - Contact the Pain Management Clinic with the following:  o A fever greater than 100° (degrees)   o Feel ill, have any type of infection, or are taking antibiotics now or within the two (2) weeks leading up to this procedure  o Have any outpatient procedures within the two (2) weeks leading up to this procedure (colonoscopy, major dental work, etc.)    IF you are  taking blood thinners: Only upon receiving clearance and notification from the Pain Management Department  7 Days Prior to Your Procedure:  - Stop taking Plavix/Clopridogrel, Effient/Prasugel, ASA  5 Days Prior to Your Procedure:  - Stop taking Coumadin/Warfarin.  An INR may be drawn prior to your procedure.  If labs are required, you will need to arrive earlier than your scheduled arrival time.  - Stop taking Pradaxa/Dabigatra,  Brilinta/ Ticagrelor  3 Days Prior to Your Procedure:  - Stop taking Xarelto/Rivaroxaban, Eliquis/Apixaban, Aggrenox/Dipyridamole, Reopro/Abciximab

## 2019-01-29 NOTE — PROGRESS NOTES
Subjective:     Patient ID: Cortes Schroeder is a 59 y.o. male    Chief Complaint: Follow-up (S/P C7-T1 YVONNE 01.16.19)      Referred by: No ref. provider found      HPI:    Interval History (1/29/19):  He returns today for follow up.  He reports that C7-T1 interlaminar epidural steroid injection has been helpful for the neck and upper extremity pain. He reports about 85% relief of the symptoms.  He is happy with this relief and does not feel as though any further interventions are needed for his neck.  However he does wish to discuss his chronic low back pain today.  He reports having chronic low back pain and is status post lumbar surgery.  The pain is located across the lower lumbar region more prominent on the right side.  The pain will radiate down the posterior right thigh and leg with associated numbness and tingling.  He reports intermittent weakness during activity but denies any focal weakness or bowel or bladder dysfunction.      Initial Encounter (1/8/19):  Cortes Schroeder is a 59 y.o. male who presents today with chronic neck pain R>L and bilateral upper extremity pain, numbness and weakness. This has been present for at least one year. The pain is located in the cervical region and radiates to the right arm, forearm and hand. There is associated numbness in this area. He reports that his right upper extremity is also weak. He denies any weakness in the left upper extremity. He denies b/b dysfunction. The pain is constant and worsened with activity.    This pain is described in detail below.    Physical Therapy: Yes. Not helpful    Non-pharmacologic Treatment: rest helps         · TENS?: no    Pain Medications:         · Currently taking: Gabapentin, Cymbalta, nortriptyline    · Has tried in the past:      · Has not tried: Opioids, NSAIDs, Tylenol, Muscle relaxants, topical creams    Blood thinners: ASA 81mg daily    Interventional Therapies:   1/16/19 - C7-T1 interlaminar epidural steroid injection -  85% relief noted    Relevant Surgeries:   Previous low back surgery no other details specified at this time    Affecting sleep? Yes    Affecting daily activities? yes    Depressive symptoms? yes          · SI/HI? No    Work status: Employed    Pain Scores:    Best:       1/10  Worst:     3/10  Usually:   1/10  Today:    1/10    Review of Systems   Constitutional: Negative for activity change, appetite change, chills, fatigue, fever and unexpected weight change.   HENT: Negative for hearing loss.    Eyes: Negative for visual disturbance.   Respiratory: Negative for chest tightness and shortness of breath.    Cardiovascular: Negative for chest pain.   Gastrointestinal: Negative for abdominal pain, constipation, diarrhea, nausea and vomiting.   Genitourinary: Negative for difficulty urinating.   Musculoskeletal: Positive for arthralgias, back pain, gait problem, myalgias, neck pain and neck stiffness.   Skin: Negative for rash.   Neurological: Positive for weakness and numbness. Negative for dizziness, light-headedness and headaches.   Psychiatric/Behavioral: Positive for sleep disturbance. Negative for hallucinations and suicidal ideas. The patient is not nervous/anxious.        Past Medical History:   Diagnosis Date    Anxiety 12/18/2012    Back pain 12/18/2012    Cataract     Chronic pain syndrome 4/24/2016    Diabetes type 2, controlled 2/20/2016    Diabetic retinopathy     DM (diabetes mellitus) 12/18/2012    DM (diabetes mellitus), type 2, uncontrolled 11/16/2013    Essential hypertension 2/20/2016    Gastroesophageal reflux disease without esophagitis 2/20/2016    Hyperlipidemia 12/18/2012    Insomnia 8/7/2014    Neuropathy 11/16/2013       Past Surgical History:   Procedure Laterality Date    BACK SURGERY  2003    Lumbar Spine    Injection, Steroid, Epidural Cervical N/A 1/16/2019    Performed by Andrew Sinclair Jr., MD at [facility] ENDO       Social History     Socioeconomic History    Marital  status:      Spouse name: Not on file    Number of children: Not on file    Years of education: Not on file    Highest education level: Not on file   Social Needs    Financial resource strain: Not on file    Food insecurity - worry: Not on file    Food insecurity - inability: Not on file    Transportation needs - medical: Not on file    Transportation needs - non-medical: Not on file   Occupational History    Not on file   Tobacco Use    Smoking status: Never Smoker    Smokeless tobacco: Never Used   Substance and Sexual Activity    Alcohol use: Yes     Alcohol/week: 0.6 oz     Types: 1 Glasses of wine per week     Comment: occasionally    Drug use: No    Sexual activity: Not Currently     Partners: Male     Birth control/protection: Condom     Comment: 10/2/17    Other Topics Concern    Not on file   Social History Narrative    Not on file       Review of patient's allergies indicates:   Allergen Reactions    Invokana [canagliflozin] Anaphylaxis    Percocet [oxycodone-acetaminophen] Nausea Only and Hallucinations    Biaxin [clarithromycin]     Hydrocodone Other (See Comments)     Dizzy/nausea/hallucinations    Sulfa (sulfonamide antibiotics) Nausea Only and Rash       Current Outpatient Medications on File Prior to Visit   Medication Sig Dispense Refill    blood sugar diagnostic (FREESTYLE INSULINX TEST STRIPS) Strp 1 strip by Misc.(Non-Drug; Combo Route) route 4 (four) times daily before meals and nightly. 200 strip 8    clotrimazole-betamethasone 1-0.05% (LOTRISONE) cream Apply topically 2 (two) times daily. 15 g 5    cyanocobalamin (VITAMIN B-12) 100 MCG tablet Take 100 mcg by mouth once daily.      DULoxetine (CYMBALTA) 60 MG capsule TAKE 1 CAPSULE(60 MG) BY MOUTH EVERY DAY 30 capsule 90    emtricitabine-tenofovir 200-300 mg (TRUVADA) 200-300 mg Tab Take 1 tablet by mouth once daily. 30 tablet 2    fish oil-omega-3 fatty acids 300-1,000 mg capsule Take by mouth 2 (two)  "times daily.      gabapentin (NEURONTIN) 100 MG capsule TAKE 1 TO 2 CAPSULES(100  MG) BY MOUTH EVERY EVENING 60 capsule 11    insulin lispro (HUMALOG U-100 INSULIN) 100 unit/mL injection  UNITS PER DAY WITH AUTOMATED INSULIN PUMP AS DIRECTED BY ENDOCRINOLOGY 30 mL 0    losartan (COZAAR) 50 MG tablet TAKE 1 TABLET(50 MG) BY MOUTH EVERY DAY 90 tablet 3    magnesium oxide-Mg AA chelate (MG-PLUS-PROTEIN) 133 mg Tab Take 500 mg by mouth every evening.       metFORMIN (GLUCOPHAGE) 1000 MG tablet TAKE 1 TABLET(1000 MG) BY MOUTH TWICE DAILY WITH MEALS 180 tablet 0    mometasone (NASONEX) 50 mcg/actuation nasal spray 2 sprays by Nasal route once daily. 17 g 4    nortriptyline (PAMELOR) 50 MG capsule Take 1 capsule (50 mg total) by mouth nightly. 30 capsule 2    pravastatin (PRAVACHOL) 80 MG tablet TAKE 1 TABLET(80 MG) BY MOUTH EVERY DAY 90 tablet 3    promethazine-dextromethorphan (PROMETHAZINE-DM) 6.25-15 mg/5 mL Syrp Take 5ml qhs prn cough 240 mL 3    PROPYLENE GLYCOL//PF (SYSTANE, PF, OPHT) Apply to eye 4 (four) times daily.      SITagliptin (JANUVIA) 100 MG Tab Take 1 tablet (100 mg total) by mouth once daily. 90 tablet 3    subcutaneous insulin pump (OMNIPOD INSULIN MANAGEMENT MISC) by Misc.(Non-Drug; Combo Route) route.      vitamin D 1000 units Tab Take 185 mg by mouth 2 (two) times daily.       aspirin (ECOTRIN) 81 MG EC tablet Take 1 tablet (81 mg total) by mouth once daily.  0     No current facility-administered medications on file prior to visit.        Objective:      BP (!) 157/87   Pulse 86   Resp 18   Ht 5' 5" (1.651 m)   Wt 95.7 kg (211 lb)   SpO2 100%   BMI 35.11 kg/m²     Exam:  GEN:  Well developed, well nourished.  No acute distress.  Normal pain behavior.  HEENT:  No trauma.  Mucous membranes moist.  Nares patent bilaterally.  PSYCH: Normal affect. Thought content appropriate.  CHEST:  Breathing symmetric.  No audible wheezing.  ABD: Soft, non-distended.  SKIN:  " Warm, pink, dry.  No rash on exposed areas.    EXT:  No cyanosis, clubbing, or edema.  No color change or changes in nail or hair growth.  NEURO/MUSCULOSKELETAL:  Fully alert, oriented, and appropriate. Speech normal tasha. No cranial nerve deficits.   Gait:  Normal.  No trendelenburg sign bilaterally.   Motor Strength: 5/5 motor strength throughout lower extremities.   Sensory:  No sensory deficit in the lower extremities.   Reflexes:  2 + and symmetric throughout.  Downgoing Babinski's bilaterally.  No clonus or spasticity.  L-Spine:  Slightly limited ROM with pain on flexion and extension. Positive facet loading bilaterally.  Negative SLR bilaterally.    SI Joint/Hip:  Negative DANA bilaterally.  Negative FADIR bilaterally.  No TTP over lumbar paraspinals, bilateral SI joints, hips, piriformis muscles, or GTB.                Imaging:    Narrative     EXAMINATION:  MRI LUMBAR SPINE WITHOUT CONTRAST    CLINICAL HISTORY:  radicular leg pain; Arthrodesis status    TECHNIQUE:  Multiplanar, multisequence MR images were acquired from the thoracolumbar junction to the sacrum without the administration of contrast.    COMPARISON:  None.    FINDINGS:  Small portions of this exam are limited by patient motion.    The vertebral bodies maintain normal height, signal intensity and alignment.  There is multilevel disc desiccation with loss of disc height identified at L4-L5 and L5-S1.  This is most advanced at L4-L5.  The conus terminates at level L1.  Evaluation of the localizer images and structures surrounding the lumbar spine reveal no abnormalities.  There is mild central canal stenosis throughout likely related to congenitally shortened pedicles.    L1-L2: No significant disc or joint pathology.    L2-L3: Mild diffuse disc bulge present.  No facet arthropathy or ligamentum flavum hypertrophy.  The central canal and neural foramen are patent.    L3-L4: Mild diffuse disc bulge with very mild bilateral facet arthropathy  and some ligamentum flavum hypertrophy.  There is mild left neural foraminal stenosis.  The right neural foramen is patent.    L4-L5: There is a large diffuse disc bulge with moderate to severe bilateral facet arthropathy and no significant ligamentum flavum hypertrophy.  No significant central canal stenosis however, there is moderate bilateral neural foraminal stenosis.    L5-S1: Mild diffuse disc bulge.  Moderate to severe right and mild-to-moderate left facet arthropathy present.  No significant ligamentum flavum hypertrophy.  The neural foramen are patent.   Impression       Multilevel degenerative disc and joint disease is present.  Congenitally shortened pedicles likely contributes to the mild central canal stenosis throughout the lumbar spine.  No severe central canal or neural foraminal stenosis at any level.      Electronically signed by: Linda Sadler MD  Date: 08/13/2018  Time: 13:54         Narrative     History: Cervicalgia.    Procedure: Sagittal T1, T2, STIR sequences and axial T2-weighted sequence and a gradient echo sequence is performed.    Comparison study none    Findings:    There is normal cervical lordosis. No marrow replacing lesions or fractures noted. Craniovertebral alignment is within normal limits. There is no Chiari type malformations. The signal intensity within the cervical cord is within normal limits.    C2-3: No disc herniations or spinal stenosis.    C3-4: There is disc dehydration. No significant disc herniations or spinal stenosis. There is at least moderate right foraminal stenosis and mild left foraminal stenosis. No cord compression.    C4-5: Approximately 2 mm posterior subluxation of C4 on C5 associated with disc dehydration and a posterior disc bulge and osteophyte complex. Effacement of the anterior subarachnoid space with slight dorsal displacement and compression of the cord is noted (image 10 series 7 and 6). There is severe bilateral foraminal stenosis. At least  mild central canal spinal stenosis.    C5-6: Severe disc degeneration with disc space narrowing and global anterior and posterior osteophyte and disc bulge complex. There is posterior displacement of the cervical cord with slight flattening of the cervical cord. There is central canal spinal stenosis. There is severe right foraminal stenosis and moderate left foraminal stenosis.    C6-7: Mild broad-based left paracentral disc herniation and osteophyte complex without cord compression or spinal stenosis. There is a moderate to severe left foraminal stenosis and moderate right foraminal stenosis.    C7-T1: No significant central canal spinal stenosis or disc herniations or foraminal stenosis.   Impression         1. Multilevel degenerative disc disease as above. There is spinal stenosis and foraminal stenotic changes more pronounced at C4-5 and C5-6 disc spaces. See details at each disc space level above.      Electronically signed by: DELL TELLEZ MD  Date: 12/12/17  Time: 14:27          Assessment:       Encounter Diagnoses   Name Primary?    DDD (degenerative disc disease), lumbar Yes    Lumbar radiculopathy     DDD (degenerative disc disease), cervical     Osteoarthritis of spine with radiculopathy, cervical region     Spinal stenosis of lumbar region without neurogenic claudication     Lumbar spondylosis          Plan:       Cortes was seen today for follow-up.    Diagnoses and all orders for this visit:    DDD (degenerative disc disease), lumbar  -     Case request GI: Injection, Steroid, Epidural    Lumbar radiculopathy  -     Case request GI: Injection, Steroid, Epidural    DDD (degenerative disc disease), cervical    Osteoarthritis of spine with radiculopathy, cervical region    Spinal stenosis of lumbar region without neurogenic claudication    Lumbar spondylosis        Cortes Schroeder is a 59 y.o. male with chronic bilateral R>L neck and upper extremity pain. Appears to be related to cervical  stenosis noted on MRI. May have radicular pain. Very good relief following C7-T1 interlaminar epidural steroid injection. Also with chronic low back pain. Subjectively sounds most like right-sided lumbar radiculopathy, however limited evidence on imaging and physical exam.  Some indications of facet mediated pain though distribution pain is not as consistent with this diagnosis.    1. Pertinent imaging studies reviewed by me. Imaging results were discussed with patient.  2. Schedule for L4-5 interlaminar epidural steroid injection.  3. RTC 2 weeks after procedure. May consider lumbar MBBs.

## 2019-02-11 ENCOUNTER — TELEPHONE (OUTPATIENT)
Dept: PAIN MEDICINE | Facility: CLINIC | Age: 59
End: 2019-02-11

## 2019-02-11 NOTE — TELEPHONE ENCOUNTER
Reminded Mr. Schroeder that he will begin holding ASA starting Wednesday, until after the procedure scheduled next week- verbalized understanding.

## 2019-02-14 ENCOUNTER — OFFICE VISIT (OUTPATIENT)
Dept: NEUROSURGERY | Facility: CLINIC | Age: 59
End: 2019-02-14
Payer: COMMERCIAL

## 2019-02-14 ENCOUNTER — OFFICE VISIT (OUTPATIENT)
Dept: ENDOCRINOLOGY | Facility: CLINIC | Age: 59
End: 2019-02-14
Payer: COMMERCIAL

## 2019-02-14 VITALS
DIASTOLIC BLOOD PRESSURE: 84 MMHG | SYSTOLIC BLOOD PRESSURE: 136 MMHG | HEIGHT: 65 IN | WEIGHT: 211.19 LBS | HEART RATE: 101 BPM | BODY MASS INDEX: 35.18 KG/M2

## 2019-02-14 VITALS
HEART RATE: 100 BPM | BODY MASS INDEX: 35.08 KG/M2 | DIASTOLIC BLOOD PRESSURE: 95 MMHG | RESPIRATION RATE: 18 BRPM | SYSTOLIC BLOOD PRESSURE: 140 MMHG | HEIGHT: 65 IN | WEIGHT: 210.56 LBS

## 2019-02-14 DIAGNOSIS — Z79.4 UNCONTROLLED TYPE 2 DIABETES MELLITUS WITH HYPERGLYCEMIA, WITH LONG-TERM CURRENT USE OF INSULIN: ICD-10-CM

## 2019-02-14 DIAGNOSIS — E78.1 HYPERTRIGLYCERIDEMIA: ICD-10-CM

## 2019-02-14 DIAGNOSIS — E11.65 UNCONTROLLED TYPE 2 DIABETES MELLITUS WITH HYPERGLYCEMIA, WITH LONG-TERM CURRENT USE OF INSULIN: ICD-10-CM

## 2019-02-14 DIAGNOSIS — M54.50 CHRONIC BILATERAL LOW BACK PAIN WITHOUT SCIATICA: ICD-10-CM

## 2019-02-14 DIAGNOSIS — M47.812 CERVICAL SPONDYLOSIS WITHOUT MYELOPATHY: ICD-10-CM

## 2019-02-14 DIAGNOSIS — G89.29 CHRONIC BILATERAL LOW BACK PAIN WITHOUT SCIATICA: ICD-10-CM

## 2019-02-14 DIAGNOSIS — M54.12 CERVICAL RADICULOPATHY: Primary | ICD-10-CM

## 2019-02-14 DIAGNOSIS — E11.3299 NPDR (NONPROLIFERATIVE DIABETIC RETINOPATHY): ICD-10-CM

## 2019-02-14 DIAGNOSIS — I10 ESSENTIAL HYPERTENSION: ICD-10-CM

## 2019-02-14 PROCEDURE — 99213 OFFICE O/P EST LOW 20 MIN: CPT | Mod: S$GLB,,, | Performed by: NEUROLOGICAL SURGERY

## 2019-02-14 PROCEDURE — 99999 PR PBB SHADOW E&M-EST. PATIENT-LVL IV: ICD-10-PCS | Mod: PBBFAC,,, | Performed by: NURSE PRACTITIONER

## 2019-02-14 PROCEDURE — 99999 PR PBB SHADOW E&M-EST. PATIENT-LVL IV: CPT | Mod: PBBFAC,,, | Performed by: NURSE PRACTITIONER

## 2019-02-14 PROCEDURE — 99213 PR OFFICE/OUTPT VISIT, EST, LEVL III, 20-29 MIN: ICD-10-PCS | Mod: S$GLB,,, | Performed by: NEUROLOGICAL SURGERY

## 2019-02-14 PROCEDURE — 99214 PR OFFICE/OUTPT VISIT, EST, LEVL IV, 30-39 MIN: ICD-10-PCS | Mod: S$GLB,,, | Performed by: NURSE PRACTITIONER

## 2019-02-14 PROCEDURE — 99214 OFFICE O/P EST MOD 30 MIN: CPT | Mod: S$GLB,,, | Performed by: NURSE PRACTITIONER

## 2019-02-14 PROCEDURE — 99999 PR PBB SHADOW E&M-EST. PATIENT-LVL III: CPT | Mod: PBBFAC,,, | Performed by: NEUROLOGICAL SURGERY

## 2019-02-14 PROCEDURE — 99999 PR PBB SHADOW E&M-EST. PATIENT-LVL III: ICD-10-PCS | Mod: PBBFAC,,, | Performed by: NEUROLOGICAL SURGERY

## 2019-02-14 NOTE — PATIENT INSTRUCTIONS
Make sure to test glucoses more regularly - 4x/day. Before each meal and bedtime.     Maintain balanced diet with 3 meals/day.     Please be sure to bolus prior to each meal.     May need to follow up with [employer] if you're having issues with insulin delivery before meals.     Return to clinic in 5 months with labs prior.

## 2019-02-14 NOTE — PROGRESS NOTES
"CHIEF COMPLAINT:  Chief Complaint   Patient presents with    Follow-up     INTERVAL HISTORY (2/14/19):  Patient returns for continued management of neck, right arm pain, and low back pain.  He recently underwent C6-7 ILESI on 1/16 by Dr Sinclair and reports significant improvement in his neck and right arm pain.  He is able to sleep better without arm pain  "It is like night and day."      He continues to have low back and RLE pain.  He is scheduled to undergo a L4-5 ILESI by Dr Sinclair on 2/20.  Denies and weakness, numbness, or tingling.    HPI:  Cortes Schroeder is a 58 y.o.  male with below listed PMH, who is referred for evaluation neck and right arm pain.  Posterior neck pain radiates to RUE down the side to back of his hand.  Whole hand will feel numb.  Unable to hold >10 lbs without dropping but denies clumsiness.  He has gotten a massage every 2 weeks without significant improvements.     He also has R LE pain, wing calf.  R calf has muscle contractions and comes on suddenly (typically at night in sleep) and lasts approx 30 mins.  Occas pain down back of leg "like sciatica."     PT 2x/wk no help  No injections     2003 had lower back fxs and surgery           Review of patient's allergies indicates:   Allergen Reactions    Invokana [canagliflozin] Anaphylaxis    Percocet [oxycodone-acetaminophen] Nausea Only and Hallucinations    Biaxin [clarithromycin]      Hydrocodone Other (See Comments)       Dizzy/nausea/hallucinations    Sulfa (sulfonamide antibiotics) Nausea Only and Rash              Past Medical History:   Diagnosis Date    Anxiety 12/18/2012    Back pain 12/18/2012    Cataract      Chronic pain syndrome 4/24/2016    Diabetes type 2, controlled 2/20/2016    Diabetic retinopathy      DM (diabetes mellitus) 12/18/2012    DM (diabetes mellitus), type 2, uncontrolled 11/16/2013    Essential hypertension 2/20/2016    Gastroesophageal reflux disease without esophagitis 2/20/2016    " Hyperlipidemia 12/18/2012    Insomnia 8/7/2014    Neuropathy 11/16/2013            Past Surgical History:   Procedure Laterality Date    BACK SURGERY   2003     Lumbar Spine            Family History   Problem Relation Age of Onset    Cataracts Father      Hypertension Father      Parkinsonism Father      Alzheimer's disease Father      Migraines Mother      Hypertension Mother      Heart attack Mother      Amblyopia Neg Hx      Blindness Neg Hx      Glaucoma Neg Hx      Macular degeneration Neg Hx      Retinal detachment Neg Hx      Strabismus Neg Hx        Social History            Tobacco Use    Smoking status: Never Smoker    Smokeless tobacco: Never Used   Substance Use Topics    Alcohol use: Yes       Alcohol/week: 0.6 oz       Types: 1 Glasses of wine per week       Comment: occasionally    Drug use: No         Review of Systems   Constitutional: Negative.    Respiratory: Negative for cough and shortness of breath.    Cardiovascular: Negative for chest pain, palpitations, claudication and leg swelling.   Gastrointestinal: Negative for abdominal pain, constipation and diarrhea.   Genitourinary: Negative for flank pain, frequency and urgency.   Musculoskeletal: Positive for back pain.   Skin: Negative.    Neurological: Positive for sensory change (R arm/hand), focal weakness (R arm) and headaches (R frontal HA). Negative for dizziness, tingling, tremors, speech change, seizures and loss of consciousness.   Psychiatric/Behavioral: Negative.          OBJECTIVE:     Vitals:    02/14/19 1541   BP: (!) 140/95   Pulse: 100   Resp: 18       Physical Exam:  Constitutional: Patient sitting comfortably in chair. Appears well developed and well nourished.  Skin: Exposed areas are intact without abnormal markings, rashes or other lesions.  HEENT: Normocephalic. Normal conjunctivae.  Cardiovascular: Normal rate and regular rhythm.  Respiratory: Chest wall rises and falls symmetrically, without signs of  respiratory distress.  Abdomen: Soft and non-tender.  Extremities: Warm and without edema. Calves supple, non-tender.  Psych/Behavior: Normal affect.     Neurological:     Mental status: Alert and oriented. Conversational and appropriate.       Cranial Nerves: VFF to confrontation. PERRL. EOMI without nystagmus. Facial STLT normal and symmetric. Strong, symmetric muscles of mastication. Facial strength full and symmetric. Hearing equal bilaterally to finger rub. Palate and uvula rise and fall normally in midline. Shoulder shrug 5/5 strength. Tongue midline.      Motor:     Upper:   Deltoids Triceps Biceps WE WF  FA     R 5/5 5/5 5/5 5/5 5/5 5/5 5/5     L 5/5 5/5 5/5 5/5 5/5 5/5 5/5      Lower:   HF KE KF DF PF EHL     R 5/5 5/5 5/5 5/5 5/5 5/5     L 5/5 5/5 5/5 5/5 5/5 5/5      Sensory: Intact sensation to light touch and pinprick in all extremities.      Reflexes:      DTR: 0-1+ knees and biceps symmetrically.     Lazar's: Negative.     Babinski's: Negative.     Clonus: Negative.     Cerebellar: Finger-to-nose and rapid alternating movements normal.      Gait:  Stable, fluid.     Spine:     Posture: Head well aligned over pelvis and shoulders in front and side views.  No focal or global spinal deformity visible on inspection.      Cervical:      ROM: Full with flexion, extension, lateral rotation and ear-to-shoulder bend.      Midline TTP: Negative.     Spurling's test: Negative.     Lhermitte's: Negative.     Thoracic:     Midline TTP: Negative     Lumbar:     Midline TTP: Negative     Straight Leg Test: Negative     Crossed Straight Leg Test: Negative     Other:     SI joint TTP: Negative.     Tenderness with external/internal hip rotation: Negative.     Diagnostic Results:  All imaging was independently reviewed by me.     MRI L spine, dated 8/13/18:  1. DDD at L4-5 with moderate bilateral NF stenosis  2. No significant central stenosis  3. Good lordosis     MRI C spine, dated 12/12/17:  1. Moderate-severe  CS at C4-5 and C5-6  2. Good lordosis     Flex/Ex X-ray L spine, dated 8/2/18:  1. No dynamic motion     Flex/Ex X-ray C spine, dated 8/2/18:  1. No dynamic motion     ASSESSMENT/PLAN:     Problem List Items Addressed This Visit        Neuro    Cervical radiculopathy - Primary    Cervical spondylosis without myelopathy       Orthopedic    Chronic bilateral low back pain without sciatica        VISIT SUMMARY:  1. Posterior neck and right arm pain in setting of moderate-severe C4-5 and C5-6 stenosis.  Improved s/p injections.  2. Low back and RLE pain. Scheduled to undergo L4-5 ILESI on 2/20.     Cont with conservative measures for now.  RTC in 6 months as needed        PATIENT EDUCATION:  More than half the clinic visit was spent showing with patient the pertinent findings on imaging and educating the patient about natural history of the pathology.   We discussed options for treatment as well as the risks and benefits of each option.  All questions were answered.

## 2019-02-14 NOTE — PROGRESS NOTES
CC: This 59 y.o. White male  is here for evaluation of  T2DM along with comorbidities indicated in the Visit Diagnosis section of this encounter.    HPI: Cortes Schroeder was diagnosed with T2DM in 1995. No medications started until 1999 under Dr. Guerrero's care. He was on an Dietrich insulin pump under Dr. Marx's care for several years until ~ 2015 but stopped it bc of the cost of insulin pump supplies.     Pt was previously followed by Dr. Mills but transferred to [facility] when she no longer took his insurance.       Prior visit on 11/12/18  a1c is the same at 9.5%. He is doing a better job testing his BGs 3-4x/day, although has been more lax in the last few days. Also seems to be better at bolusing ac. Still missing boluses sometimes however.   He has gained 3 lb since lov. He has only 2 pods left.   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   Average TDD 79 units, basal 57%.   Plan Increase basal rate by 20% to 2.4 units/hr.   Increase carb ratio from 4 to 3.   Decrease active insulin time for 4 to 3 hours.   Test blood sugar 4x/day. Please remember to bolus before each meal and snack. Return to clinic in 3 months with labs prior.     Interval history  a1c up from 9.5 in Oct to 11.5% in January.   He is very upset that he was without any pods x 2 weeks d/t problems with his [employer] company.   He has been without januvia x 2 weeks at least. Did not call for a refill. Has only been testing glucoses 2.3 times a day. Continues to forget to bolus for meals. In the last 2 weeks, there have been 2 days without any boluses.     He is changing insurance plans. Has been told that Fiasp is covered not humalog.   He states he has tried several times to bolus after entering in his meal amount but gets a rec'd dose of 0 units. States he can override it but doesn't always.       HOSPITALIZED FOR DIABETES OR RELATED COMPLICATIONS -  Yes -   DKA presumably r/t flu - Feb 2018   DKA 11/2016  DKA 2/2015    PRESCRIBED DIABETES  "MEDICATIONS: metformin 1000 mg bid, Januvia 100 mg once daily, Humalog in Omnipod pump     Misses medication doses -     Tends to bolus for set meals under 15, 30, 45, 60 grams carbs. He does feel comfortable with carb counting.     Basal rate 2.4 u/hr  ICR 15, ISF 15, goal 120, active insulin time 3 hours     Changes pod every 2 days.     DM COMPLICATIONS: nephropathy and peripheral neuropathy    SIGNIFICANT DIABETES MED HISTORY:   DKA on Invokana in 2/2015   Switched from Novolog 70/30 to toujeo/novolog ~ January 2018     SELF MONITORING BLOOD GLUCOSE: Checks blood glucose at home 2.7x/day. Average glucose 218, ranges 120 to 362.     HYPOGLYCEMIC EPISODES: feels symptoms starting at BG in the 120s - got dizzy.      CURRENT DIET: eats 2 meals/day. Rarely eats 3 meals/day. Usually eats dinner every night.  Loves Taco Tuesday and has chips with salsa, 5 tacos, no more margaritas however.  "Tuesdays are my bad days."     CURRENT EXERCISE: none d/t back pain     SOCIAL: his friend who has DM lives with him.       /84 (BP Location: Right arm, Patient Position: Sitting, BP Method: Large (Automatic))   Pulse 101   Ht 5' 5" (1.651 m)   Wt 95.8 kg (211 lb 3.2 oz)   BMI 35.15 kg/m²       ROS:   CONSTITUTIONAL: Appetite good, + fatigue  MS: + chronic back and neck pain       PHYSICAL EXAM:  GENERAL: Well developed, well nourished. No acute distress.   PSYCH: AAOx3, appropriate mood and affect, conversant, well-groomed. Judgement and insight good.   NEURO: Cranial nerves grossly intact. Speech clear, no tremor.   CHEST: Respirations even and unlabored.   FEET: No skin breakdown.       Hemoglobin A1C   Date Value Ref Range Status   01/12/2019 11.5 (H) 4.0 - 5.6 % Final     Comment:     ADA Screening Guidelines:  5.7-6.4%  Consistent with prediabetes  >or=6.5%  Consistent with diabetes  High levels of fetal hemoglobin interfere with the HbA1C  assay. Heterozygous hemoglobin variants (HbS, HgC, etc)do  not significantly " interfere with this assay.   However, presence of multiple variants may affect accuracy.     10/15/2018 9.5 (H) 4.0 - 5.6 % Final     Comment:     ADA Screening Guidelines:  5.7-6.4%  Consistent with prediabetes  >or=6.5%  Consistent with diabetes  High levels of fetal hemoglobin interfere with the HbA1C  assay. Heterozygous hemoglobin variants (HbS, HgC, etc)do  not significantly interfere with this assay.   However, presence of multiple variants may affect accuracy.     08/08/2018 9.4 (H) 4.0 - 5.6 % Final     Comment:     ADA Screening Guidelines:  5.7-6.4%  Consistent with prediabetes  >or=6.5%  Consistent with diabetes  High levels of fetal hemoglobin interfere with the HbA1C  assay. Heterozygous hemoglobin variants (HbS, HgC, etc)do  not significantly interfere with this assay.   However, presence of multiple variants may affect accuracy.             Chemistry        Component Value Date/Time     (L) 01/21/2019 1128    K 4.6 01/21/2019 1128    CL 99 01/21/2019 1128    CO2 29 01/21/2019 1128    BUN 17 01/21/2019 1128    CREATININE 1.1 01/21/2019 1128     (H) 01/21/2019 1128        Component Value Date/Time    CALCIUM 9.3 01/21/2019 1128    ALKPHOS 110 01/21/2019 1128    AST 26 01/21/2019 1128    ALT 42 01/21/2019 1128    BILITOT 0.5 01/21/2019 1128    ESTGFRAFRICA >60 01/21/2019 1128    EGFRNONAA >60 01/21/2019 1128          Lab Results   Component Value Date    LDLCALC 56.8 (L) 01/12/2019        Ref. Range 1/12/2019 08:42   Cholesterol Latest Ref Range: 120 - 199 mg/dL 148   HDL Latest Ref Range: 40 - 75 mg/dL 45   HDL/Chol Ratio Latest Ref Range: 20.0 - 50.0 % 30.4   LDL Cholesterol Latest Ref Range: 63.0 - 159.0 mg/dL 56.8 (L)   Non-HDL Cholesterol Latest Units: mg/dL 103   Total Cholesterol/HDL Ratio Latest Ref Range: 2.0 - 5.0  3.3   Triglycerides Latest Ref Range: 30 - 150 mg/dL 231 (H)       Lab Results   Component Value Date    MICALBCREAT 4.8 08/08/2018           STANDARDS of CARE:         ASA:               Last eye exam: 4/2018      ASSESSMENT and PLAN:    A1C GOAL: < 7 %       1. Diabetes mellitus type 2, uncontrolled, with complications  Change from Humalog to Fiasp d/t formulary change.     Make sure to test glucoses more regularly - 4x/day. Before each meal and bedtime.     Maintain balanced diet with 3 meals/day.     Please be sure to bolus prior to each meal. -- suspect this is the biggest reason why his BGs are uncontrolled.     Increase basal rate from 12 am to 5 am to 2.4 u/hr.     May need to follow up with [facility] if you're having issues with insulin delivery before meals. -- should perform an override if he needs to bolus ac.     Return to clinic in 5 months with labs prior.     Hemoglobin A1c   2. Hypertriglyceridemia  Improve glycemic control.      3. NPDR (nonproliferative diabetic retinopathy)  Hemoglobin A1c   4. Essential hypertension  Continue current tx             Orders Placed This Encounter   Procedures    Hemoglobin A1c     Standing Status:   Future     Standing Expiration Date:   4/14/2020        Follow-up in about 5 months (around 7/14/2019).

## 2019-02-15 ENCOUNTER — PATIENT MESSAGE (OUTPATIENT)
Dept: ENDOCRINOLOGY | Facility: CLINIC | Age: 59
End: 2019-02-15

## 2019-02-18 ENCOUNTER — PATIENT MESSAGE (OUTPATIENT)
Dept: ENDOCRINOLOGY | Facility: CLINIC | Age: 59
End: 2019-02-18

## 2019-02-20 ENCOUNTER — HOSPITAL ENCOUNTER (OUTPATIENT)
Facility: HOSPITAL | Age: 59
Discharge: HOME | End: 2019-02-20
Attending: PAIN MEDICINE | Admitting: PAIN MEDICINE
Payer: COMMERCIAL

## 2019-02-20 ENCOUNTER — HOSPITAL ENCOUNTER (OUTPATIENT)
Dept: RADIOLOGY | Facility: HOSPITAL | Age: 59
Discharge: HOME | End: 2019-02-20
Attending: PAIN MEDICINE | Admitting: PAIN MEDICINE
Payer: COMMERCIAL

## 2019-02-20 VITALS
DIASTOLIC BLOOD PRESSURE: 80 MMHG | OXYGEN SATURATION: 94 % | TEMPERATURE: 98 F | SYSTOLIC BLOOD PRESSURE: 150 MMHG | HEART RATE: 94 BPM | HEIGHT: 65 IN | RESPIRATION RATE: 18 BRPM | BODY MASS INDEX: 34.99 KG/M2 | WEIGHT: 210 LBS

## 2019-02-20 DIAGNOSIS — M51.36 DDD (DEGENERATIVE DISC DISEASE), LUMBAR: Primary | ICD-10-CM

## 2019-02-20 DIAGNOSIS — M54.12 RIGHT CERVICAL RADICULOPATHY: ICD-10-CM

## 2019-02-20 PROBLEM — M51.369 DDD (DEGENERATIVE DISC DISEASE), LUMBAR: Status: ACTIVE | Noted: 2019-02-20

## 2019-02-20 LAB — POCT GLUCOSE: 190 MG/DL (ref 70–110)

## 2019-02-20 PROCEDURE — 63600175 PHARM REV CODE 636 W HCPCS: Performed by: PAIN MEDICINE

## 2019-02-20 PROCEDURE — 62323 NJX INTERLAMINAR LMBR/SAC: CPT | Mod: ,,, | Performed by: PAIN MEDICINE

## 2019-02-20 PROCEDURE — 62323 PR INJ LUMBAR/SACRAL, W/IMAGING GUIDANCE: ICD-10-PCS | Mod: ,,, | Performed by: PAIN MEDICINE

## 2019-02-20 PROCEDURE — 62323 NJX INTERLAMINAR LMBR/SAC: CPT | Performed by: PAIN MEDICINE

## 2019-02-20 PROCEDURE — 62322 NJX INTERLAMINAR LMBR/SAC: CPT | Performed by: PAIN MEDICINE

## 2019-02-20 PROCEDURE — 25500020 PHARM REV CODE 255: Performed by: PAIN MEDICINE

## 2019-02-20 PROCEDURE — 25000003 PHARM REV CODE 250: Performed by: PAIN MEDICINE

## 2019-02-20 PROCEDURE — 77003 FLUOROGUIDE FOR SPINE INJECT: CPT | Performed by: PAIN MEDICINE

## 2019-02-20 RX ORDER — DEXAMETHASONE SODIUM PHOSPHATE 10 MG/ML
INJECTION INTRAMUSCULAR; INTRAVENOUS
Status: DISCONTINUED
Start: 2019-02-20 | End: 2019-02-20 | Stop reason: HOSPADM

## 2019-02-20 RX ORDER — LIDOCAINE HYDROCHLORIDE 10 MG/ML
1 INJECTION, SOLUTION EPIDURAL; INFILTRATION; INTRACAUDAL; PERINEURAL ONCE
Status: COMPLETED | OUTPATIENT
Start: 2019-02-20 | End: 2019-02-20

## 2019-02-20 RX ORDER — DEXAMETHASONE SODIUM PHOSPHATE 10 MG/ML
10 INJECTION INTRAMUSCULAR; INTRAVENOUS ONCE
Status: COMPLETED | OUTPATIENT
Start: 2019-02-20 | End: 2019-02-20

## 2019-02-20 RX ORDER — ALPRAZOLAM 0.5 MG/1
TABLET, ORALLY DISINTEGRATING ORAL
Status: DISCONTINUED
Start: 2019-02-20 | End: 2019-02-20 | Stop reason: HOSPADM

## 2019-02-20 RX ORDER — ALPRAZOLAM 0.5 MG/1
1 TABLET, ORALLY DISINTEGRATING ORAL ONCE AS NEEDED
Status: COMPLETED | OUTPATIENT
Start: 2019-02-20 | End: 2019-02-20

## 2019-02-20 RX ORDER — LIDOCAINE HYDROCHLORIDE 10 MG/ML
INJECTION, SOLUTION EPIDURAL; INFILTRATION; INTRACAUDAL; PERINEURAL
Status: DISCONTINUED
Start: 2019-02-20 | End: 2019-02-20 | Stop reason: HOSPADM

## 2019-02-20 RX ORDER — LIDOCAINE HYDROCHLORIDE 20 MG/ML
10 INJECTION, SOLUTION INFILTRATION; PERINEURAL ONCE
Status: COMPLETED | OUTPATIENT
Start: 2019-02-20 | End: 2019-02-20

## 2019-02-20 RX ORDER — LIDOCAINE HYDROCHLORIDE 20 MG/ML
INJECTION, SOLUTION INFILTRATION; PERINEURAL
Status: DISCONTINUED
Start: 2019-02-20 | End: 2019-02-20 | Stop reason: HOSPADM

## 2019-02-20 RX ADMIN — ALPRAZOLAM 1 MG: 0.5 TABLET, ORALLY DISINTEGRATING ORAL at 12:02

## 2019-02-20 NOTE — DISCHARGE INSTRUCTIONS

## 2019-02-20 NOTE — DISCHARGE SUMMARY
Discharge Diagnosis:DDD (degenerative disc disease), lumbar [M51.36]  Lumbar radiculopathy [M54.16]  Condition on Discharge: Stable.  Diet on Discharge: Same as before.  Activity: as per instruction sheet.  Discharge to: Home with a responsible adult.  Follow up: as per Discharge instructions

## 2019-02-20 NOTE — OP NOTE
Lumbar Interlaminar Epidural Steroid Injection under Fluoroscopic Guidance.  Time-out taken to identify patient and procedure side prior to starting the procedure.   I attest that I have reviewed the patient's home medications prior to the procedure and no contraindication have been identified. I  re-evaluated the patient after the patient was positioned for the procedure in the procedure room immediately before the procedural time-out. The vital signs are current and represent the current state of the patient which has not significantly changed since the preprocedure assessment.                                                               Date of Service: 02/20/2019    PCP: Hira Acosta MD    Referring Physician:    Procedure:  L4-5 Interlaminar epidural steroid injection under fluoroscopic guidance.    Reasons for procedure: DDD (degenerative disc disease), lumbar [M51.36]  Lumbar radiculopathy [M54.16]     Physician: Andrew Sinclair MD  ASSISTANTS: None    Medications injected: Preservative-free dexamethasone 10mg with 2mL of sterile Xylocaine-MPF 1% and 2ml of sterile preservative-free normal saline.    Local anesthetic injected:    Xylocaine 2% 9ml with Sodium Bicarbonate 3ml.   Sedation Medications: None    Estimated blood loss:  none.    Complications:  none.    Technique:  With the patient laying in a prone position, the area was prepped and draped in the usual sterile fashion using ChloraPrep and a fenestrated drape.  Local anesthetic was given using a 27-gauge needle by raising a wheal and going down to the hub of the needle.  A 3.5 inch 20-gauge Touhy needle was introduced under fluoroscopic guidance.  It met the lamina of the posterior element. The needle was then hinged above the lamina.  Loss of resistance technique was employed while advancing the needle.  Once in the desired position, contrast dye Omnipaque was injected to confirm placement and there was no vascular runoff.  Digital  subtraction was employed to confirm that there was no vascular runoff.  The medication was then injected slowly.  The patient tolerated the procedure well.      Pain before the procedure: 2/10    Pain after the procedure: 1/10    The patient was monitored after the procedure.   They were given post-procedure and discharge instructions to follow at home.  The patient was discharged in a stable condition.

## 2019-02-20 NOTE — INTERVAL H&P NOTE
The patient has been examined and the H&P has been reviewed:    I concur with the findings and no changes have occurred since H&P was written.    Anesthesia/Surgery risks, benefits and alternative options discussed and understood by patient/family.          Active Hospital Problems    Diagnosis  POA    DDD (degenerative disc disease), lumbar [M51.36]  Yes      Resolved Hospital Problems   No resolved problems to display.

## 2019-03-10 ENCOUNTER — PATIENT MESSAGE (OUTPATIENT)
Dept: FAMILY MEDICINE | Facility: CLINIC | Age: 59
End: 2019-03-10

## 2019-03-12 DIAGNOSIS — Z79.4 UNCONTROLLED TYPE 2 DIABETES MELLITUS WITH HYPERGLYCEMIA, WITH LONG-TERM CURRENT USE OF INSULIN: ICD-10-CM

## 2019-03-12 DIAGNOSIS — E11.65 UNCONTROLLED TYPE 2 DIABETES MELLITUS WITH HYPERGLYCEMIA, WITH LONG-TERM CURRENT USE OF INSULIN: ICD-10-CM

## 2019-03-12 RX ORDER — NORTRIPTYLINE HYDROCHLORIDE 50 MG/1
50 CAPSULE ORAL NIGHTLY
Qty: 30 CAPSULE | Refills: 2 | Status: SHIPPED | OUTPATIENT
Start: 2019-03-12 | End: 2019-03-14 | Stop reason: SDUPTHER

## 2019-03-12 RX ORDER — METFORMIN HYDROCHLORIDE 1000 MG/1
TABLET ORAL
Qty: 180 TABLET | Refills: 0 | Status: SHIPPED | OUTPATIENT
Start: 2019-03-12 | End: 2019-07-03 | Stop reason: SDUPTHER

## 2019-03-14 RX ORDER — NORTRIPTYLINE HYDROCHLORIDE 50 MG/1
50 CAPSULE ORAL NIGHTLY
Qty: 30 CAPSULE | Refills: 2 | Status: SHIPPED | OUTPATIENT
Start: 2019-03-14 | End: 2019-12-03 | Stop reason: SDUPTHER

## 2019-04-22 ENCOUNTER — PATIENT MESSAGE (OUTPATIENT)
Dept: ENDOCRINOLOGY | Facility: CLINIC | Age: 59
End: 2019-04-22

## 2019-04-22 ENCOUNTER — PATIENT MESSAGE (OUTPATIENT)
Dept: PAIN MEDICINE | Facility: CLINIC | Age: 59
End: 2019-04-22

## 2019-04-24 ENCOUNTER — PATIENT MESSAGE (OUTPATIENT)
Dept: FAMILY MEDICINE | Facility: CLINIC | Age: 59
End: 2019-04-24

## 2019-04-26 ENCOUNTER — PATIENT OUTREACH (OUTPATIENT)
Dept: ADMINISTRATIVE | Facility: HOSPITAL | Age: 59
End: 2019-04-26

## 2019-04-27 ENCOUNTER — LAB VISIT (OUTPATIENT)
Dept: LAB | Facility: HOSPITAL | Age: 59
End: 2019-04-27
Attending: NURSE PRACTITIONER
Payer: COMMERCIAL

## 2019-04-27 DIAGNOSIS — E11.3299 NPDR (NONPROLIFERATIVE DIABETIC RETINOPATHY): ICD-10-CM

## 2019-04-27 LAB
ESTIMATED AVG GLUCOSE: 286 MG/DL (ref 68–131)
HBA1C MFR BLD HPLC: 11.6 % (ref 4–5.6)

## 2019-04-27 PROCEDURE — 36415 COLL VENOUS BLD VENIPUNCTURE: CPT | Mod: PO

## 2019-04-27 PROCEDURE — 83036 HEMOGLOBIN GLYCOSYLATED A1C: CPT

## 2019-05-10 ENCOUNTER — OFFICE VISIT (OUTPATIENT)
Dept: FAMILY MEDICINE | Facility: CLINIC | Age: 59
End: 2019-05-10
Payer: COMMERCIAL

## 2019-05-10 VITALS
SYSTOLIC BLOOD PRESSURE: 130 MMHG | DIASTOLIC BLOOD PRESSURE: 84 MMHG | RESPIRATION RATE: 17 BRPM | OXYGEN SATURATION: 96 % | TEMPERATURE: 98 F | HEART RATE: 98 BPM | WEIGHT: 212.94 LBS | BODY MASS INDEX: 35.48 KG/M2 | HEIGHT: 65 IN

## 2019-05-10 DIAGNOSIS — Z20.6 HIV EXPOSURE: Primary | ICD-10-CM

## 2019-05-10 DIAGNOSIS — Z20.6 EXPOSURE TO HIV: ICD-10-CM

## 2019-05-10 DIAGNOSIS — J41.0 SIMPLE CHRONIC BRONCHITIS: ICD-10-CM

## 2019-05-10 PROCEDURE — 99214 OFFICE O/P EST MOD 30 MIN: CPT | Mod: S$GLB,,, | Performed by: FAMILY MEDICINE

## 2019-05-10 PROCEDURE — 99214 PR OFFICE/OUTPT VISIT, EST, LEVL IV, 30-39 MIN: ICD-10-PCS | Mod: S$GLB,,, | Performed by: FAMILY MEDICINE

## 2019-05-10 PROCEDURE — 99999 PR PBB SHADOW E&M-EST. PATIENT-LVL III: CPT | Mod: PBBFAC,,, | Performed by: FAMILY MEDICINE

## 2019-05-10 PROCEDURE — 99999 PR PBB SHADOW E&M-EST. PATIENT-LVL III: ICD-10-PCS | Mod: PBBFAC,,, | Performed by: FAMILY MEDICINE

## 2019-05-10 RX ORDER — PRAVASTATIN SODIUM 80 MG/1
80 TABLET ORAL
COMMUNITY
End: 2019-05-10 | Stop reason: SDUPTHER

## 2019-05-10 RX ORDER — EMTRICITABINE AND TENOFOVIR DISOPROXIL FUMARATE 200; 300 MG/1; MG/1
1 TABLET, FILM COATED ORAL DAILY
Qty: 30 TABLET | Refills: 2 | Status: SHIPPED | OUTPATIENT
Start: 2019-05-10 | End: 2019-05-10

## 2019-05-10 RX ORDER — EMTRICITABINE AND TENOFOVIR DISOPROXIL FUMARATE 200; 300 MG/1; MG/1
1 TABLET, FILM COATED ORAL DAILY
Qty: 30 TABLET | Refills: 2 | Status: SHIPPED | OUTPATIENT
Start: 2019-05-10 | End: 2019-06-27 | Stop reason: SDUPTHER

## 2019-05-10 RX ORDER — AZITHROMYCIN 250 MG/1
TABLET, FILM COATED ORAL
Qty: 6 TABLET | Refills: 0 | Status: CANCELLED | OUTPATIENT
Start: 2019-05-10 | End: 2019-05-15

## 2019-05-10 RX ORDER — LOSARTAN POTASSIUM 50 MG/1
100 TABLET ORAL
COMMUNITY
Start: 2018-02-21 | End: 2019-05-10 | Stop reason: SDUPTHER

## 2019-05-10 RX ORDER — METFORMIN HYDROCHLORIDE 1000 MG/1
1000 TABLET ORAL
COMMUNITY
End: 2019-05-10 | Stop reason: SDUPTHER

## 2019-05-10 RX ORDER — GABAPENTIN 100 MG/1
100 CAPSULE ORAL
COMMUNITY
End: 2019-05-10 | Stop reason: SDUPTHER

## 2019-05-10 RX ORDER — DOXYCYCLINE 100 MG/1
100 CAPSULE ORAL EVERY 12 HOURS
Qty: 20 CAPSULE | Refills: 0 | Status: SHIPPED | OUTPATIENT
Start: 2019-05-10 | End: 2019-05-20

## 2019-05-10 RX ORDER — TAMSULOSIN HYDROCHLORIDE 0.4 MG/1
0.4 CAPSULE ORAL
COMMUNITY
End: 2019-05-14

## 2019-05-10 RX ORDER — INSULIN ASPART 100 [IU]/ML
35 INJECTION, SUSPENSION SUBCUTANEOUS
COMMUNITY
Start: 2018-02-21 | End: 2019-05-14

## 2019-05-10 NOTE — PROGRESS NOTES
Routine Office Visit    Patient Name: Cortes Schroeder    : 1960  MRN: [id number]    Subjective:  Cortes is a 59 y.o. male who presents today for:    1. PrEP  Patient presenting today for follow up of PrEP.  He has been taking medication as prescribed.  He has not had any side effects related to the medication.  He is not currently sexually active.  When he is, its the same partner.  They occasionally use condoms.  He states that blood sugars have been doing better.   No rashes, penile discharge, night sweats, or unexplained weight loss    2.  Cough and congestion  Patient presenting with 3 weeks of worsening cough and congestion.  No fevers, chills, or sweats, but he does get short of breath from time to time when coughing is severe.  He states that the cough has gotten worse.  No hemoptysis or cough induced emesis.  Patients immune system is impaired by uncontrolled type 2 DM with most recent a1c of 11.6 2 weeks ago    Past Medical History  Past Medical History:   Diagnosis Date    Anxiety 2012    Back pain 2012    Cataract     Chronic pain syndrome 2016    Diabetes type 2, controlled 2016    Diabetic retinopathy     DM (diabetes mellitus) 2012    DM (diabetes mellitus), type 2, uncontrolled 2013    Essential hypertension 2016    Gastroesophageal reflux disease without esophagitis 2016    Hyperlipidemia 2012    Insomnia 2014    Neuropathy 2013       Past Surgical History  Past Surgical History:   Procedure Laterality Date    BACK SURGERY      Lumbar Spine    Injection, Steroid, Epidural N/A 2019    Performed by Andrew Sinclair Jr[facility] at [facility]    Injection, Steroid, Epidural Cervical N/A 2019    Performed by Andrew Sinclair Jr., MD at [facility]       Family History  Family History   Problem Relation Age of Onset    Cataracts Father     Hypertension Father     Parkinsonism Father     Alzheimer's disease  Father     Migraines Mother     Hypertension Mother     Heart attack Mother     Amblyopia Neg Hx     Blindness Neg Hx     Glaucoma Neg Hx     Macular degeneration Neg Hx     Retinal detachment Neg Hx     Strabismus Neg Hx        Social History  Social History     Socioeconomic History    Marital status:      Spouse name: Not on file    Number of children: Not on file    Years of education: Not on file    Highest education level: Not on file   Occupational History    Not on file   Social Needs    Financial resource strain: Not on file    Food insecurity:     Worry: Not on file     Inability: Not on file    Transportation needs:     Medical: Not on file     Non-medical: Not on file   Tobacco Use    Smoking status: Never Smoker    Smokeless tobacco: Never Used   Substance and Sexual Activity    Alcohol use: Yes     Alcohol/week: 0.6 oz     Types: 1 Glasses of wine per week     Comment: occasionally    Drug use: No    Sexual activity: Not Currently     Partners: Male     Birth control/protection: Condom     Comment: 10/2/17    Lifestyle    Physical activity:     Days per week: Not on file     Minutes per session: Not on file    Stress: Not on file   Relationships    Social connections:     Talks on phone: Not on file     Gets together: Not on file     Attends Christianity service: Not on file     Active member of club or organization: Not on file     Attends meetings of clubs or organizations: Not on file     Relationship status: Not on file   Other Topics Concern    Not on file   Social History Narrative    Not on file       Current Medications  Current Outpatient Medications on File Prior to Visit   Medication Sig Dispense Refill    blood sugar diagnostic (FREESTYLE INSULINX TEST STRIPS) Strp 1 strip by Misc.(Non-Drug; Combo Route) route 4 (four) times daily before meals and nightly. 200 strip 8    clotrimazole-betamethasone 1-0.05% (LOTRISONE) cream Apply topically 2 (two) times  daily. 15 g 5    cyanocobalamin (VITAMIN B-12) 100 MCG tablet Take 100 mcg by mouth once daily.      DULoxetine (CYMBALTA) 60 MG capsule TAKE 1 CAPSULE(60 MG) BY MOUTH EVERY DAY 30 capsule 90    emtricitabine-tenofovir 200-300 mg (TRUVADA) 200-300 mg Tab Take 1 tablet by mouth once daily. 30 tablet 2    fish oil-omega-3 fatty acids 300-1,000 mg capsule Take by mouth 2 (two) times daily.      gabapentin (NEURONTIN) 100 MG capsule TAKE 1 TO 2 CAPSULES(100  MG) BY MOUTH EVERY EVENING 60 capsule 11    insulin aspart, niacinamide, (FIASP U-100 INSULIN) 100 unit/mL Soln Use continuously in insulin pump. Max dose is 110 units/day. 4 vial 5    losartan (COZAAR) 50 MG tablet TAKE 1 TABLET(50 MG) BY MOUTH EVERY DAY 90 tablet 3    magnesium oxide-Mg AA chelate (MG-PLUS-PROTEIN) 133 mg Tab Take 500 mg by mouth every evening.       metFORMIN (GLUCOPHAGE) 1000 MG tablet TAKE 1 TABLET(1000 MG) BY MOUTH TWICE DAILY WITH MEALS 180 tablet 0    nortriptyline (PAMELOR) 50 MG capsule Take 1 capsule (50 mg total) by mouth nightly. 30 capsule 2    pravastatin (PRAVACHOL) 80 MG tablet TAKE 1 TABLET(80 MG) BY MOUTH EVERY DAY 90 tablet 3    promethazine-dextromethorphan (PROMETHAZINE-DM) 6.25-15 mg/5 mL Syrp Take 5ml qhs prn cough 240 mL 3    PROPYLENE GLYCOL//PF (SYSTANE, PF, OPHT) Apply to eye 4 (four) times daily.      subcutaneous insulin pump (OMNIPOD INSULIN MANAGEMENT MISC) by Misc.(Non-Drug; Combo Route) route.      tamsulosin (FLOMAX) 0.4 mg Cap Take 0.4 mg by mouth.      vitamin D 1000 units Tab Take 185 mg by mouth 2 (two) times daily.       [DISCONTINUED] losartan (COZAAR) 50 MG tablet Take 100 mg by mouth.      aspirin (ECOTRIN) 81 MG EC tablet Take 1 tablet (81 mg total) by mouth once daily.  0    insulin asp prt-insulin aspart, NOVOLOG 70/30, (NOVOLOG 70/30) 100 unit/mL (70-30) Soln Inject 35 Units into the skin.      SITagliptin (JANUVIA) 100 MG Tab Take 1 tablet (100 mg total) by mouth once  "daily. 90 tablet 2    SITagliptin (JANUVIA) 100 MG Tab Take 100 mg by mouth.      [DISCONTINUED] gabapentin (NEURONTIN) 100 MG capsule Take 100 mg by mouth.      [DISCONTINUED] metFORMIN (GLUCOPHAGE) 1000 MG tablet TAKE 1 TABLET(1000 MG) BY MOUTH TWICE DAILY WITH MEALS 180 tablet 0    [DISCONTINUED] metFORMIN (GLUCOPHAGE) 1000 MG tablet Take 1,000 mg by mouth.      [DISCONTINUED] pravastatin (PRAVACHOL) 80 MG tablet Take 80 mg by mouth.       No current facility-administered medications on file prior to visit.        Allergies   Review of patient's allergies indicates:   Allergen Reactions    Invokana [canagliflozin] Anaphylaxis    Percocet [oxycodone-acetaminophen] Nausea Only and Hallucinations    Biaxin [clarithromycin]     Hydrocodone Other (See Comments)     Dizzy/nausea/hallucinations    Sulfa (sulfonamide antibiotics) Nausea Only and Rash       Review of Systems (Pertinent positives)  Review of Systems   HENT: Positive for congestion and hearing loss.    Eyes: Negative for discharge.   Respiratory: Negative for wheezing.    Cardiovascular: Negative for chest pain and palpitations.   Gastrointestinal: Negative for blood in stool, constipation, diarrhea and vomiting.   Genitourinary: Negative for hematuria and urgency.   Musculoskeletal: Positive for joint pain and neck pain.   Neurological: Negative for weakness and headaches.   Endo/Heme/Allergies: Negative for polydipsia.         /84 (BP Location: Right arm, Patient Position: Sitting, BP Method: Medium (Manual))   Pulse 98   Temp 97.5 °F (36.4 °C) (Oral)   Resp 17   Ht 5' 5" (1.651 m)   Wt 96.6 kg (212 lb 15.4 oz)   SpO2 96%   BMI 35.44 kg/m²     GENERAL APPEARANCE: in no apparent distress and well developed and well nourished  HEENT: PERRL, EOMI, Sclera clear, anicteric, Oropharynx clear, no lesions, Neck supple with midline trachea  NECK: normal, supple, no adenopathy, thyroid normal in size  RESPIRATORY: appears well, vitals normal, " no respiratory distress, acyanotic, normal RR, chest clear, no wheezing, crepitations, rhonchi, normal symmetric air entry  HEART: regular rate and rhythm, S1, S2 normal, no murmur, click, rub or gallop.    ABDOMEN: abdomen is soft without tenderness, no masses, no hernias, no organomegaly, no rebound, no guarding. Suprapubic tenderness absent. No CVA tenderness.  SKIN: no rashes, no wounds, no other lesions  PSYCH: Alert, oriented x 3, thought content appropriate, speech normal, pleasant and cooperative, good eye contact, well groomed    Assessment/Plan:  Cortes Schroeder is a 59 y.o. male who presents today for :    There are no diagnoses linked to this encounter.  1.  Truvada refilled  2.  Labs to be done today to evaluate HIV status and renal function  3.  Follow up 3 months or sooner if needed  4.  Doxycycline for bronchitis    Don Artis MD

## 2019-05-14 ENCOUNTER — OFFICE VISIT (OUTPATIENT)
Dept: UROLOGY | Facility: CLINIC | Age: 59
End: 2019-05-14
Attending: FAMILY MEDICINE
Payer: COMMERCIAL

## 2019-05-14 VITALS
SYSTOLIC BLOOD PRESSURE: 130 MMHG | BODY MASS INDEX: 35.52 KG/M2 | HEIGHT: 65 IN | WEIGHT: 213.19 LBS | DIASTOLIC BLOOD PRESSURE: 80 MMHG

## 2019-05-14 DIAGNOSIS — N52.9 ED (ERECTILE DYSFUNCTION) OF ORGANIC ORIGIN: ICD-10-CM

## 2019-05-14 DIAGNOSIS — N20.0 KIDNEY STONES: ICD-10-CM

## 2019-05-14 DIAGNOSIS — N40.0 BPH WITHOUT OBSTRUCTION/LOWER URINARY TRACT SYMPTOMS: Primary | ICD-10-CM

## 2019-05-14 DIAGNOSIS — R35.1 NOCTURIA MORE THAN TWICE PER NIGHT: ICD-10-CM

## 2019-05-14 PROCEDURE — 99999 PR PBB SHADOW E&M-EST. PATIENT-LVL III: ICD-10-PCS | Mod: PBBFAC,,, | Performed by: UROLOGY

## 2019-05-14 PROCEDURE — 81001 PR  URINALYSIS, AUTO, W/SCOPE: ICD-10-PCS | Mod: S$GLB,,, | Performed by: UROLOGY

## 2019-05-14 PROCEDURE — 81001 URINALYSIS AUTO W/SCOPE: CPT | Mod: S$GLB,,, | Performed by: UROLOGY

## 2019-05-14 PROCEDURE — 99214 PR OFFICE/OUTPT VISIT, EST, LEVL IV, 30-39 MIN: ICD-10-PCS | Mod: 25,S$GLB,, | Performed by: UROLOGY

## 2019-05-14 PROCEDURE — 99999 PR PBB SHADOW E&M-EST. PATIENT-LVL III: CPT | Mod: PBBFAC,,, | Performed by: UROLOGY

## 2019-05-14 PROCEDURE — 99214 OFFICE O/P EST MOD 30 MIN: CPT | Mod: 25,S$GLB,, | Performed by: UROLOGY

## 2019-05-14 NOTE — PROGRESS NOTES
Subjective:       Patient ID: Cortes Schroeder is a 59 y.o. male who was last seen in this office Visit date not found    Chief Complaint:   Chief Complaint   Patient presents with    Benign Prostatic Hypertrophy     6 month f/u        History of Kidney Stones  He has never required a procedure.  He passed a stone in 2003. He feels fine, no hematuria or dysuria or flank pain.    Erectile Dysfunction  Patient complains of erectile dysfunction. Onset of dysfunction was several years ago and was gradual in onset.  Patient states the nature of difficulty is both attaining and maintaining erection. Full erections occur never. Partial erections occur never. Libido is not affected. Risk factors for ED include diabetes mellitus. Patient denies history of pelvic radiation. Previous treatment of ED includes Viagra and Cialis and Trimix.  His  passed away in May 2017, he is not interested in medications at present.    Benign Prostatic Hyperplasia  He patient reports frequency and nocturia two times a night. He denies straining, urgency and weak stream. The patient states symptoms are of mild severity. Onset of symptoms was several years ago and was gradual in onset.  He has no personal history and no family history of prostate cancer. He reports a history of kidney stones. He denies flank pain, gross hematuria and recurrent UTI.  He is currently taking no prostate medications.  ACTIVE MEDICAL ISSUES:  Patient Active Problem List   Diagnosis    Hyperlipidemia    Anxiety    Chronic bilateral low back pain without sciatica    Neuropathy    Uncontrolled type 2 diabetes mellitus with diabetic polyneuropathy, with long-term current use of insulin    NPDR (nonproliferative diabetic retinopathy)    Insomnia    Gastroesophageal reflux disease without esophagitis    Hypertension, well controlled    Chronic pain syndrome    Diabetic nephropathy associated with type 2 diabetes mellitus    Right sided weakness     Cervical syndrome    Chronic neck pain    Muscle weakness    Impaired motor control    Decreased range of motion (ROM) of shoulder    Cervical spondylosis without myelopathy    Neuroforaminal stenosis of cervical spine    Right cervical radiculopathy    Cervical radiculopathy    DDD (degenerative disc disease), lumbar       ALLERGIES AND MEDICATIONS: updated and reviewed.  Review of patient's allergies indicates:   Allergen Reactions    Invokana [canagliflozin] Anaphylaxis    Percocet [oxycodone-acetaminophen] Nausea Only and Hallucinations    Biaxin [clarithromycin]     Hydrocodone Other (See Comments)     Dizzy/nausea/hallucinations    Sulfa (sulfonamide antibiotics) Nausea Only and Rash     Current Outpatient Medications   Medication Sig    blood sugar diagnostic (FREESTYLE INSULINX TEST STRIPS) Strp 1 strip by Misc.(Non-Drug; Combo Route) route 4 (four) times daily before meals and nightly.    clotrimazole-betamethasone 1-0.05% (LOTRISONE) cream Apply topically 2 (two) times daily.    cyanocobalamin (VITAMIN B-12) 100 MCG tablet Take 100 mcg by mouth once daily.    doxycycline (VIBRAMYCIN) 100 MG Cap Take 1 capsule (100 mg total) by mouth every 12 (twelve) hours. Wear sunscreen for 10 days    DULoxetine (CYMBALTA) 60 MG capsule TAKE 1 CAPSULE(60 MG) BY MOUTH EVERY DAY    emtricitabine-tenofovir 200-300 mg (TRUVADA) 200-300 mg Tab Take 1 tablet by mouth once daily.    fish oil-omega-3 fatty acids 300-1,000 mg capsule Take by mouth 2 (two) times daily.    gabapentin (NEURONTIN) 100 MG capsule TAKE 1 TO 2 CAPSULES(100  MG) BY MOUTH EVERY EVENING    insulin aspart, niacinamide, (FIASP U-100 INSULIN) 100 unit/mL Soln Use continuously in insulin pump. Max dose is 110 units/day.    insulin pump controller (OMNIPOD DASH PDM KIT MISC) by Misc.(Non-Drug; Combo Route) route.    losartan (COZAAR) 50 MG tablet TAKE 1 TABLET(50 MG) BY MOUTH EVERY DAY    magnesium oxide-Mg AA chelate  "(MG-PLUS-PROTEIN) 133 mg Tab Take 500 mg by mouth every evening.     metFORMIN (GLUCOPHAGE) 1000 MG tablet TAKE 1 TABLET(1000 MG) BY MOUTH TWICE DAILY WITH MEALS    nortriptyline (PAMELOR) 50 MG capsule Take 1 capsule (50 mg total) by mouth nightly.    pravastatin (PRAVACHOL) 80 MG tablet TAKE 1 TABLET(80 MG) BY MOUTH EVERY DAY    promethazine-dextromethorphan (PROMETHAZINE-DM) 6.25-15 mg/5 mL Syrp Take 5ml qhs prn cough    PROPYLENE GLYCOL//PF (SYSTANE, PF, OPHT) Apply to eye 4 (four) times daily.    vitamin D 1000 units Tab Take 185 mg by mouth 2 (two) times daily.     aspirin (ECOTRIN) 81 MG EC tablet Take 1 tablet (81 mg total) by mouth once daily.     No current facility-administered medications for this visit.        Review of Systems   Constitutional: Negative for activity change, fatigue, fever and unexpected weight change.   HENT: Negative for congestion.    Eyes: Negative for redness.   Respiratory: Negative for chest tightness and shortness of breath.    Cardiovascular: Negative for chest pain and leg swelling.   Gastrointestinal: Negative for abdominal pain, constipation, diarrhea, nausea and vomiting.   Genitourinary: Negative for dysuria, flank pain, frequency, hematuria, penile pain, penile swelling, scrotal swelling, testicular pain and urgency.   Musculoskeletal: Negative for arthralgias and back pain.   Neurological: Negative for dizziness and light-headedness.   Psychiatric/Behavioral: Negative for behavioral problems and confusion. The patient is not nervous/anxious.    All other systems reviewed and are negative.      Objective:      Vitals:    05/14/19 1545   BP: 130/80   BP Location: Left arm   Patient Position: Sitting   BP Method: Medium (Manual)   Weight: 96.7 kg (213 lb 3 oz)   Height: 5' 5" (1.651 m)     Physical Exam   Nursing note and vitals reviewed.  Constitutional: He is oriented to person, place, and time. He appears well-developed and well-nourished.   HENT:   Head: " Normocephalic.   Eyes: Conjunctivae are normal.   Neck: Normal range of motion. No tracheal deviation present. No thyromegaly present.   Cardiovascular: Normal rate and normal heart sounds.    Pulmonary/Chest: Effort normal and breath sounds normal. No respiratory distress. He has no wheezes.   Abdominal: Soft. He exhibits no distension and no mass. There is no hepatosplenomegaly. There is no tenderness. There is no rebound, no guarding and no CVA tenderness. No hernia. Hernia confirmed negative in the right inguinal area and confirmed negative in the left inguinal area.   Genitourinary: Rectum normal, testes normal and penis normal. Rectal exam shows no external hemorrhoid, no mass and no tenderness. Prostate is enlarged. Prostate is not tender. Right testis shows no mass and no tenderness. Left testis shows no mass and no tenderness.       Musculoskeletal: He exhibits no edema or tenderness.   Lymphadenopathy: No inguinal adenopathy noted on the right or left side.   Neurological: He is alert and oriented to person, place, and time.   Skin: Skin is warm and dry. No rash noted. No erythema.     Psychiatric: He has a normal mood and affect. His behavior is normal. Judgment and thought content normal.       Urine dipstick shows negative for all components.  Micro exam: negative for WBC's or RBC's.    Assessment:       1. BPH without obstruction/lower urinary tract symptoms    2. ED (erectile dysfunction) of organic origin    3. Nocturia more than twice per night    4. Kidney stones          Plan:       1. BPH without obstruction/lower urinary tract symptoms    - Prostate Specific Antigen, Diagnostic; Future    2. ED (erectile dysfunction) of organic origin  Trimix when needed    3. Nocturia more than twice per night    - POCT urinalysis, dipstick or tablet reag    4. Kidney stones    - US Retroperitoneal Complete (Kidney and; Future            Follow up in about 6 months (around 11/14/2019) for Follow up.

## 2019-05-14 NOTE — LETTER
May 14, 2019      Don Artis MD  [address] [facility]  [place] [address]           [facility]  [address] [facility] [address]  [place] [address]  Phone: [phone]  Fax: [phone]          Patient: Cortes Schroeder   MR Number: [id number]   YOB: 1960   Date of Visit: 5/14/2019       Dear Dr. Don HUGGINS Page:    Thank you for referring Cortes Schroeder to me for evaluation. Attached you will find relevant portions of my assessment and plan of care.    If you have questions, please do not hesitate to call me. I look forward to following Cortes Schroeder along with you.    Sincerely,    DEEPTHI Villegas MD    Enclosure  CC:  No Recipients    If you would like to receive this communication electronically, please contact [email or web address] or [phone] to request more information on [facility] Link access.    For providers and/or their staff who would like to refer a patient to [facility], please contact us through our one-stop-shop provider referral line, [facility], at [phone].    If you feel you have received this communication in error or would no longer like to receive these types of communications, please e-mail [email or web address]

## 2019-05-17 ENCOUNTER — PATIENT MESSAGE (OUTPATIENT)
Dept: FAMILY MEDICINE | Facility: CLINIC | Age: 59
End: 2019-05-17

## 2019-05-22 ENCOUNTER — TELEPHONE (OUTPATIENT)
Dept: FAMILY MEDICINE | Facility: CLINIC | Age: 59
End: 2019-05-22

## 2019-05-22 NOTE — TELEPHONE ENCOUNTER
Patient due for follow up visit with Dr Acosta before PCP leave [facility]. LM for patient to call back to schedule. Sent [facility] message

## 2019-05-24 NOTE — TELEPHONE ENCOUNTER
Patient due for follow up visit with Dr Acosta before PCP leave [facility]. LM for patient to call back to schedule.

## 2019-05-31 ENCOUNTER — PATIENT MESSAGE (OUTPATIENT)
Dept: FAMILY MEDICINE | Facility: CLINIC | Age: 59
End: 2019-05-31

## 2019-05-31 DIAGNOSIS — E11.42 DIABETIC PERIPHERAL NEUROPATHY ASSOCIATED WITH TYPE 2 DIABETES MELLITUS: ICD-10-CM

## 2019-05-31 RX ORDER — GABAPENTIN 100 MG/1
CAPSULE ORAL
Qty: 60 CAPSULE | Refills: 3 | Status: SHIPPED | OUTPATIENT
Start: 2019-05-31 | End: 2019-10-14 | Stop reason: SDUPTHER

## 2019-06-03 ENCOUNTER — TELEPHONE (OUTPATIENT)
Dept: FAMILY MEDICINE | Facility: CLINIC | Age: 59
End: 2019-06-03

## 2019-06-03 RX ORDER — NORTRIPTYLINE HYDROCHLORIDE 50 MG/1
CAPSULE ORAL
Qty: 30 CAPSULE | Refills: 2 | Status: SHIPPED | OUTPATIENT
Start: 2019-06-03 | End: 2019-06-12

## 2019-06-03 NOTE — TELEPHONE ENCOUNTER
----- Message from Joaquina Ritchie sent at 6/3/2019  8:35 AM CDT -----  Contact: self [facility] [phone]  .Type: Patient Call Back    Who called: self    What is the request in detail: Patient c/o cough, sore throat, headache. Was trying to get in today with Dr. Acosta, but nothing's available. States he was seen by Dr. Artis about 3 wks ago for the problem and was treated for bronchitis. Would like to talk to nurse or  about this.    Would the patient rather a call back or a response via [facility]?  Call back    Best call back number: [facility] [phone]

## 2019-06-03 NOTE — TELEPHONE ENCOUNTER
Returned call to patient lm noting no available appointments with  today, patient can schedule with an available provider to address symptoms.

## 2019-06-12 ENCOUNTER — OFFICE VISIT (OUTPATIENT)
Dept: FAMILY MEDICINE | Facility: CLINIC | Age: 59
End: 2019-06-12
Payer: COMMERCIAL

## 2019-06-12 VITALS
WEIGHT: 209.19 LBS | TEMPERATURE: 99 F | OXYGEN SATURATION: 97 % | HEIGHT: 65 IN | RESPIRATION RATE: 16 BRPM | SYSTOLIC BLOOD PRESSURE: 118 MMHG | BODY MASS INDEX: 34.85 KG/M2 | DIASTOLIC BLOOD PRESSURE: 76 MMHG | HEART RATE: 102 BPM

## 2019-06-12 DIAGNOSIS — J32.9 CHRONIC SINUSITIS, UNSPECIFIED LOCATION: Primary | ICD-10-CM

## 2019-06-12 PROCEDURE — 99999 PR PBB SHADOW E&M-EST. PATIENT-LVL V: ICD-10-PCS | Mod: PBBFAC,,, | Performed by: FAMILY MEDICINE

## 2019-06-12 PROCEDURE — 99214 PR OFFICE/OUTPT VISIT, EST, LEVL IV, 30-39 MIN: ICD-10-PCS | Mod: S$GLB,,, | Performed by: FAMILY MEDICINE

## 2019-06-12 PROCEDURE — 3074F PR MOST RECENT SYSTOLIC BLOOD PRESSURE < 130 MM HG: ICD-10-PCS | Mod: CPTII,S$GLB,, | Performed by: FAMILY MEDICINE

## 2019-06-12 PROCEDURE — 3078F PR MOST RECENT DIASTOLIC BLOOD PRESSURE < 80 MM HG: ICD-10-PCS | Mod: CPTII,S$GLB,, | Performed by: FAMILY MEDICINE

## 2019-06-12 PROCEDURE — 3008F PR BODY MASS INDEX (BMI) DOCUMENTED: ICD-10-PCS | Mod: CPTII,S$GLB,, | Performed by: FAMILY MEDICINE

## 2019-06-12 PROCEDURE — 99999 PR PBB SHADOW E&M-EST. PATIENT-LVL V: CPT | Mod: PBBFAC,,, | Performed by: FAMILY MEDICINE

## 2019-06-12 PROCEDURE — 99214 OFFICE O/P EST MOD 30 MIN: CPT | Mod: S$GLB,,, | Performed by: FAMILY MEDICINE

## 2019-06-12 PROCEDURE — 3078F DIAST BP <80 MM HG: CPT | Mod: CPTII,S$GLB,, | Performed by: FAMILY MEDICINE

## 2019-06-12 PROCEDURE — 3008F BODY MASS INDEX DOCD: CPT | Mod: CPTII,S$GLB,, | Performed by: FAMILY MEDICINE

## 2019-06-12 PROCEDURE — 3074F SYST BP LT 130 MM HG: CPT | Mod: CPTII,S$GLB,, | Performed by: FAMILY MEDICINE

## 2019-06-12 RX ORDER — GUAIFENESIN 600 MG/1
1200 TABLET, EXTENDED RELEASE ORAL 2 TIMES DAILY
COMMUNITY
End: 2019-08-01

## 2019-06-12 RX ORDER — FLUTICASONE PROPIONATE 50 MCG
1 SPRAY, SUSPENSION (ML) NASAL 2 TIMES DAILY
Qty: 1 BOTTLE | Refills: 6 | Status: SHIPPED | OUTPATIENT
Start: 2019-06-12 | End: 2020-01-03

## 2019-06-12 RX ORDER — ACETAMINOPHEN 160 MG/5ML
LIQUID ORAL
COMMUNITY
End: 2019-08-13

## 2019-06-12 RX ORDER — MONTELUKAST SODIUM 10 MG/1
10 TABLET ORAL EVERY MORNING
Qty: 90 TABLET | Refills: 0 | Status: SHIPPED | OUTPATIENT
Start: 2019-06-12 | End: 2019-07-12

## 2019-06-12 NOTE — PATIENT INSTRUCTIONS
1) Flonase - 6 weeks  2) Zyrtec (cetirizine) 10mg at night  3) Singulair (Montelukast) in the morning   4) Mucinex ER

## 2019-06-12 NOTE — PROGRESS NOTES
Subjective:       Patient ID: Cortes Schroeder is a 59 y.o. male.    Chief Complaint: Nasal Congestion; Cough; and Headache    HPI    Upper Respiratory Symptoms:    Onset # of days:14+ 2 months    Presence of:  Cough:Yes  SOB:No  Wheeze:No  Rhinorrhea:Y  PND:Yes  Nasal Congestion:No  Fever:No  Chills: No  Nausea:No  Vomiting:No   Diarrhea:No  HA: No  Myalgias: No  Sxs intermittent?: YES    Sick Contacts?:No    Treatments Tried: mucinex and OTC cough/cold syrup    Relief?:Yes      Current Outpatient Medications on File Prior to Visit   Medication Sig Dispense Refill    acetaminophen (TYLENOL) 160 mg/5 mL (5 mL) Soln       aspirin (ECOTRIN) 81 MG EC tablet Take 1 tablet (81 mg total) by mouth once daily.  0    blood sugar diagnostic (FREESTYLE INSULINX TEST STRIPS) Strp 1 strip by Misc.(Non-Drug; Combo Route) route 4 (four) times daily before meals and nightly. 200 strip 8    clotrimazole-betamethasone 1-0.05% (LOTRISONE) cream Apply topically 2 (two) times daily. 15 g 5    cyanocobalamin (VITAMIN B-12) 100 MCG tablet Take 100 mcg by mouth once daily.      DULoxetine (CYMBALTA) 60 MG capsule TAKE 1 CAPSULE(60 MG) BY MOUTH EVERY DAY 30 capsule 90    emtricitabine-tenofovir 200-300 mg (TRUVADA) 200-300 mg Tab Take 1 tablet by mouth once daily. 30 tablet 2    fish oil-omega-3 fatty acids 300-1,000 mg capsule Take by mouth 2 (two) times daily.      gabapentin (NEURONTIN) 100 MG capsule TAKE 1 TO 2 CAPSULES(100  MG) BY MOUTH EVERY EVENING 60 capsule 3    guaiFENesin (MUCINEX) 600 mg 12 hr tablet Take 1,200 mg by mouth 2 (two) times daily.      insulin aspart, niacinamide, (FIASP U-100 INSULIN) 100 unit/mL Soln Use continuously in insulin pump. Max dose is 110 units/day. 4 vial 5    insulin pump controller (OMNIPOD DASH PDM KIT MISC) by Misc.(Non-Drug; Combo Route) route.      losartan (COZAAR) 50 MG tablet TAKE 1 TABLET(50 MG) BY MOUTH EVERY DAY 90 tablet 3    magnesium oxide-Mg AA chelate  (MG-PLUS-PROTEIN) 133 mg Tab Take 500 mg by mouth every evening.       metFORMIN (GLUCOPHAGE) 1000 MG tablet TAKE 1 TABLET(1000 MG) BY MOUTH TWICE DAILY WITH MEALS 180 tablet 0    nortriptyline (PAMELOR) 50 MG capsule Take 1 capsule (50 mg total) by mouth nightly. 30 capsule 2    pravastatin (PRAVACHOL) 80 MG tablet TAKE 1 TABLET(80 MG) BY MOUTH EVERY DAY 90 tablet 3    promethazine-dextromethorphan (PROMETHAZINE-DM) 6.25-15 mg/5 mL Syrp Take 5ml qhs prn cough 240 mL 3    PROPYLENE GLYCOL//PF (SYSTANE, PF, OPHT) Apply to eye 4 (four) times daily.      vitamin D 1000 units Tab Take 185 mg by mouth 2 (two) times daily.       [DISCONTINUED] nortriptyline (PAMELOR) 50 MG capsule TAKE 1 CAPSULE BY MOUTH EVERY DAY IN THE EVENING 30 capsule 2     No current facility-administered medications on file prior to visit.        Past Medical History:   Diagnosis Date    Anxiety 12/18/2012    Back pain 12/18/2012    Cataract     Chronic pain syndrome 4/24/2016    Diabetes type 2, controlled 2/20/2016    Diabetic retinopathy     DM (diabetes mellitus) 12/18/2012    DM (diabetes mellitus), type 2, uncontrolled 11/16/2013    Essential hypertension 2/20/2016    Gastroesophageal reflux disease without esophagitis 2/20/2016    Hyperlipidemia 12/18/2012    Insomnia 8/7/2014    Neuropathy 11/16/2013       Family History   Problem Relation Age of Onset    Cataracts Father     Hypertension Father     Parkinsonism Father     Alzheimer's disease Father     Migraines Mother     Hypertension Mother     Heart attack Mother     Amblyopia Neg Hx     Blindness Neg Hx     Glaucoma Neg Hx     Macular degeneration Neg Hx     Retinal detachment Neg Hx     Strabismus Neg Hx         reports that he has never smoked. He has never used smokeless tobacco. He reports that he drinks about 0.6 oz of alcohol per week. He reports that he does not use drugs.    Review of Systems   Constitutional: Positive for chills and  fever.   HENT: Positive for postnasal drip, rhinorrhea and sore throat. Negative for ear pain.    Respiratory: Positive for cough and shortness of breath. Negative for wheezing.    Cardiovascular: Positive for chest pain.   Musculoskeletal: Positive for myalgias.   Skin: Negative for rash.   Allergic/Immunologic: Positive for environmental allergies.   Neurological: Positive for headaches.       Objective:     Vitals:    06/12/19 1045   BP: 118/76   Pulse: 102   Resp: 16   Temp: 98.5 °F (36.9 °C)        Physical Exam   Constitutional: He appears well-developed. No distress.   O   HENT:   Head: Normocephalic and atraumatic.   Right Ear: Tympanic membrane normal. Tympanic membrane is not injected.   Left Ear: Tympanic membrane is not injected.   PND   Eyes: Conjunctivae are normal. No scleral icterus.   Pulmonary/Chest: Effort normal.   Neurological: He is alert.   Psychiatric: He has a normal mood and affect. His behavior is normal.   Nursing note and vitals reviewed.      Assessment:       1. Chronic sinusitis, unspecified location        Plan:       Cortes was seen today for nasal congestion, cough and headache.    Diagnoses and all orders for this visit:    Chronic sinusitis, unspecified location  -     fluticasone propionate (FLONASE) 50 mcg/actuation nasal spray; 1 spray (50 mcg total) by Each Nare route 2 (two) times daily.  -     montelukast (SINGULAIR) 10 mg tablet; Take 1 tablet (10 mg total) by mouth every morning.    New dx - pt educated on disease etiology, prognosis and treatment. Answered pts questions.      Pt to inform me if they develop any new or worsening sxs. They also have been notified that they can contact me for any other concerns.           Follow up if symptoms worsen or fail to improve.        Pt verbalized understanding and agreed with our plan.

## 2019-06-27 DIAGNOSIS — Z20.6 EXPOSURE TO HIV: ICD-10-CM

## 2019-06-27 RX ORDER — EMTRICITABINE AND TENOFOVIR DISOPROXIL FUMARATE 200; 300 MG/1; MG/1
1 TABLET, FILM COATED ORAL DAILY
Qty: 30 TABLET | Refills: 2 | Status: SHIPPED | OUTPATIENT
Start: 2019-06-27 | End: 2019-08-01 | Stop reason: SDUPTHER

## 2019-07-03 ENCOUNTER — TELEPHONE (OUTPATIENT)
Dept: ENDOCRINOLOGY | Facility: CLINIC | Age: 59
End: 2019-07-03

## 2019-07-03 ENCOUNTER — PATIENT MESSAGE (OUTPATIENT)
Dept: ENDOCRINOLOGY | Facility: CLINIC | Age: 59
End: 2019-07-03

## 2019-07-03 DIAGNOSIS — Z79.4 UNCONTROLLED TYPE 2 DIABETES MELLITUS WITH HYPERGLYCEMIA, WITH LONG-TERM CURRENT USE OF INSULIN: ICD-10-CM

## 2019-07-03 DIAGNOSIS — E11.65 UNCONTROLLED TYPE 2 DIABETES MELLITUS WITH HYPERGLYCEMIA, WITH LONG-TERM CURRENT USE OF INSULIN: ICD-10-CM

## 2019-07-03 RX ORDER — METFORMIN HYDROCHLORIDE 1000 MG/1
TABLET ORAL
Qty: 180 TABLET | Refills: 0 | Status: SHIPPED | OUTPATIENT
Start: 2019-07-03 | End: 2019-10-07 | Stop reason: SDUPTHER

## 2019-07-03 NOTE — TELEPHONE ENCOUNTER
----- Message from Violetta Woods NP sent at 7/3/2019  3:20 PM CDT -----  Needs an appeal for coverage of his Freestyle Test strips.     He needs these test strips because he uses an Omnipod insulin pump and only the Freestyle meter can automatically transmit his glucose readings to his insulin pump. He requires this automatic transmission to ensure safe and precise insulin dosing.

## 2019-07-24 ENCOUNTER — PATIENT MESSAGE (OUTPATIENT)
Dept: FAMILY MEDICINE | Facility: CLINIC | Age: 59
End: 2019-07-24

## 2019-07-25 ENCOUNTER — PATIENT MESSAGE (OUTPATIENT)
Dept: FAMILY MEDICINE | Facility: CLINIC | Age: 59
End: 2019-07-25

## 2019-07-25 DIAGNOSIS — Z20.6 HIV EXPOSURE: Primary | ICD-10-CM

## 2019-07-27 ENCOUNTER — LAB VISIT (OUTPATIENT)
Dept: LAB | Facility: HOSPITAL | Age: 59
End: 2019-07-27
Attending: FAMILY MEDICINE
Payer: COMMERCIAL

## 2019-07-27 DIAGNOSIS — Z20.6 HIV EXPOSURE: ICD-10-CM

## 2019-07-27 LAB
ALBUMIN SERPL BCP-MCNC: 3.7 G/DL (ref 3.5–5.2)
ALP SERPL-CCNC: 131 U/L (ref 55–135)
ALT SERPL W/O P-5'-P-CCNC: 41 U/L (ref 10–44)
ANION GAP SERPL CALC-SCNC: 12 MMOL/L (ref 8–16)
AST SERPL-CCNC: 29 U/L (ref 10–40)
BILIRUB SERPL-MCNC: 0.2 MG/DL (ref 0.1–1)
BUN SERPL-MCNC: 10 MG/DL (ref 6–20)
CALCIUM SERPL-MCNC: 9.3 MG/DL (ref 8.7–10.5)
CHLORIDE SERPL-SCNC: 103 MMOL/L (ref 95–110)
CO2 SERPL-SCNC: 23 MMOL/L (ref 23–29)
CREAT SERPL-MCNC: 1 MG/DL (ref 0.5–1.4)
EST. GFR  (AFRICAN AMERICAN): >60 ML/MIN/1.73 M^2
EST. GFR  (NON AFRICAN AMERICAN): >60 ML/MIN/1.73 M^2
ESTIMATED AVG GLUCOSE: 289 MG/DL (ref 68–131)
GLUCOSE SERPL-MCNC: 169 MG/DL (ref 70–110)
HBA1C MFR BLD HPLC: 11.7 % (ref 4–5.6)
POTASSIUM SERPL-SCNC: 4.2 MMOL/L (ref 3.5–5.1)
PROT SERPL-MCNC: 6.7 G/DL (ref 6–8.4)
SODIUM SERPL-SCNC: 138 MMOL/L (ref 136–145)

## 2019-07-27 PROCEDURE — 86703 HIV-1/HIV-2 1 RESULT ANTBDY: CPT

## 2019-07-27 PROCEDURE — 80053 COMPREHEN METABOLIC PANEL: CPT

## 2019-07-27 PROCEDURE — 83036 HEMOGLOBIN GLYCOSYLATED A1C: CPT

## 2019-07-27 PROCEDURE — 36415 COLL VENOUS BLD VENIPUNCTURE: CPT | Mod: PO

## 2019-07-29 LAB — HIV 1+2 AB+HIV1 P24 AG SERPL QL IA: NEGATIVE

## 2019-08-01 ENCOUNTER — OFFICE VISIT (OUTPATIENT)
Dept: ENDOCRINOLOGY | Facility: CLINIC | Age: 59
End: 2019-08-01
Payer: COMMERCIAL

## 2019-08-01 ENCOUNTER — OFFICE VISIT (OUTPATIENT)
Dept: FAMILY MEDICINE | Facility: CLINIC | Age: 59
End: 2019-08-01
Payer: COMMERCIAL

## 2019-08-01 VITALS
SYSTOLIC BLOOD PRESSURE: 148 MMHG | OXYGEN SATURATION: 96 % | BODY MASS INDEX: 34.78 KG/M2 | WEIGHT: 208.75 LBS | DIASTOLIC BLOOD PRESSURE: 91 MMHG | TEMPERATURE: 97 F | HEIGHT: 65 IN | HEART RATE: 90 BPM | RESPIRATION RATE: 17 BRPM

## 2019-08-01 VITALS
HEART RATE: 98 BPM | SYSTOLIC BLOOD PRESSURE: 136 MMHG | DIASTOLIC BLOOD PRESSURE: 86 MMHG | BODY MASS INDEX: 34.63 KG/M2 | WEIGHT: 208.13 LBS

## 2019-08-01 DIAGNOSIS — I10 ESSENTIAL HYPERTENSION: ICD-10-CM

## 2019-08-01 DIAGNOSIS — Z20.6 EXPOSURE TO HIV: ICD-10-CM

## 2019-08-01 DIAGNOSIS — E11.3299 NPDR (NONPROLIFERATIVE DIABETIC RETINOPATHY): ICD-10-CM

## 2019-08-01 PROCEDURE — 3079F DIAST BP 80-89 MM HG: CPT | Mod: CPTII,S$GLB,, | Performed by: NURSE PRACTITIONER

## 2019-08-01 PROCEDURE — 3074F SYST BP LT 130 MM HG: CPT | Mod: CPTII,S$GLB,, | Performed by: FAMILY MEDICINE

## 2019-08-01 PROCEDURE — 3075F SYST BP GE 130 - 139MM HG: CPT | Mod: CPTII,S$GLB,, | Performed by: NURSE PRACTITIONER

## 2019-08-01 PROCEDURE — 99999 PR PBB SHADOW E&M-EST. PATIENT-LVL III: ICD-10-PCS | Mod: PBBFAC,,, | Performed by: FAMILY MEDICINE

## 2019-08-01 PROCEDURE — 3008F BODY MASS INDEX DOCD: CPT | Mod: CPTII,S$GLB,, | Performed by: NURSE PRACTITIONER

## 2019-08-01 PROCEDURE — 99999 PR PBB SHADOW E&M-EST. PATIENT-LVL III: CPT | Mod: PBBFAC,,, | Performed by: FAMILY MEDICINE

## 2019-08-01 PROCEDURE — 3008F BODY MASS INDEX DOCD: CPT | Mod: CPTII,S$GLB,, | Performed by: FAMILY MEDICINE

## 2019-08-01 PROCEDURE — 99214 OFFICE O/P EST MOD 30 MIN: CPT | Mod: S$GLB,,, | Performed by: FAMILY MEDICINE

## 2019-08-01 PROCEDURE — 3074F PR MOST RECENT SYSTOLIC BLOOD PRESSURE < 130 MM HG: ICD-10-PCS | Mod: CPTII,S$GLB,, | Performed by: FAMILY MEDICINE

## 2019-08-01 PROCEDURE — 3008F PR BODY MASS INDEX (BMI) DOCUMENTED: ICD-10-PCS | Mod: CPTII,S$GLB,, | Performed by: FAMILY MEDICINE

## 2019-08-01 PROCEDURE — 99999 PR PBB SHADOW E&M-EST. PATIENT-LVL V: ICD-10-PCS | Mod: PBBFAC,,, | Performed by: NURSE PRACTITIONER

## 2019-08-01 PROCEDURE — 3008F PR BODY MASS INDEX (BMI) DOCUMENTED: ICD-10-PCS | Mod: CPTII,S$GLB,, | Performed by: NURSE PRACTITIONER

## 2019-08-01 PROCEDURE — 3079F PR MOST RECENT DIASTOLIC BLOOD PRESSURE 80-89 MM HG: ICD-10-PCS | Mod: CPTII,S$GLB,, | Performed by: NURSE PRACTITIONER

## 2019-08-01 PROCEDURE — 99215 OFFICE O/P EST HI 40 MIN: CPT | Mod: S$GLB,,, | Performed by: NURSE PRACTITIONER

## 2019-08-01 PROCEDURE — 3075F PR MOST RECENT SYSTOLIC BLOOD PRESS GE 130-139MM HG: ICD-10-PCS | Mod: CPTII,S$GLB,, | Performed by: NURSE PRACTITIONER

## 2019-08-01 PROCEDURE — 99999 PR PBB SHADOW E&M-EST. PATIENT-LVL V: CPT | Mod: PBBFAC,,, | Performed by: NURSE PRACTITIONER

## 2019-08-01 PROCEDURE — 3078F PR MOST RECENT DIASTOLIC BLOOD PRESSURE < 80 MM HG: ICD-10-PCS | Mod: CPTII,S$GLB,, | Performed by: FAMILY MEDICINE

## 2019-08-01 PROCEDURE — 99215 PR OFFICE/OUTPT VISIT, EST, LEVL V, 40-54 MIN: ICD-10-PCS | Mod: S$GLB,,, | Performed by: NURSE PRACTITIONER

## 2019-08-01 PROCEDURE — 3046F PR MOST RECENT HEMOGLOBIN A1C LEVEL > 9.0%: ICD-10-PCS | Mod: CPTII,S$GLB,, | Performed by: NURSE PRACTITIONER

## 2019-08-01 PROCEDURE — 99214 PR OFFICE/OUTPT VISIT, EST, LEVL IV, 30-39 MIN: ICD-10-PCS | Mod: S$GLB,,, | Performed by: FAMILY MEDICINE

## 2019-08-01 PROCEDURE — 3046F HEMOGLOBIN A1C LEVEL >9.0%: CPT | Mod: CPTII,S$GLB,, | Performed by: NURSE PRACTITIONER

## 2019-08-01 PROCEDURE — 3078F DIAST BP <80 MM HG: CPT | Mod: CPTII,S$GLB,, | Performed by: FAMILY MEDICINE

## 2019-08-01 RX ORDER — EMTRICITABINE AND TENOFOVIR DISOPROXIL FUMARATE 200; 300 MG/1; MG/1
1 TABLET, FILM COATED ORAL DAILY
Qty: 30 TABLET | Refills: 2 | Status: SHIPPED | OUTPATIENT
Start: 2019-08-01 | End: 2020-01-23

## 2019-08-01 RX ORDER — INSULIN LISPRO 100 [IU]/ML
INJECTION, SOLUTION INTRAVENOUS; SUBCUTANEOUS
Qty: 50 ML | Refills: 5 | Status: SHIPPED | OUTPATIENT
Start: 2019-08-01 | End: 2020-06-08

## 2019-08-01 NOTE — PATIENT INSTRUCTIONS
Will have to do prior authorization for Humalog since you have tried and failed Fiasp and Novolog. Maximum dose 150 units/day.   Our fax number is [phone]  Needs to change Omnipods every 1.5 days.   Please ask DMS to send orders for us to sign.     See Diabetes Educator/Registered Dietician for Freestyle jared training. Pt would also benefit from some diet review.     Monitor blood sugar 4x/day before meals and bedtime.   Return to clinic in 6 weeks.

## 2019-08-01 NOTE — PROGRESS NOTES
Routine Office Visit    Patient Name: Cortes Schroeder    : 1960  MRN: [id number]    Subjective:  Cortes is a 59 y.o. male who presents today for:    1. PrEP  Patient presenting today for follow up of PrEP.  He has been taking medication as prescribed.  He has not had any side effects related to the medication.  He is not currently sexually active.  When he is, its the same partner.  They occasionally use condoms.  He states that blood sugars have been doing better.   No rashes, penile discharge, night sweats, or unexplained weight loss    Past Medical History  Past Medical History:   Diagnosis Date    Anxiety 2012    Back pain 2012    Cataract     Chronic pain syndrome 2016    Diabetes type 2, controlled 2016    Diabetic retinopathy     DM (diabetes mellitus) 2012    DM (diabetes mellitus), type 2, uncontrolled 2013    Essential hypertension 2016    Gastroesophageal reflux disease without esophagitis 2016    Hyperlipidemia 2012    Insomnia 2014    Neuropathy 2013       Past Surgical History  Past Surgical History:   Procedure Laterality Date    BACK SURGERY      Lumbar Spine    Injection, Steroid, Epidural N/A 2019    Performed by [facility] Jr[facility]    Injection, Steroid, Epidural Cervical N/A 2019    Performed by [facility] Jr[facility] at [facility] END[facility]       Family History  Family History   Problem Relation Age of Onset    Cataracts Father     Hypertension Father     Parkinsonism Father     Alzheimer's disease Father     Migraines Mother     Hypertension Mother     Heart attack Mother     Amblyopia Neg Hx     Blindness Neg Hx     Glaucoma Neg Hx     Macular degeneration Neg Hx     Retinal detachment Neg Hx     Strabismus Neg Hx        Social History  Social History     Socioeconomic History    Marital status:      Spouse name: Not on file    Number of children: Not on file     Years of education: Not on file    Highest education level: Not on file   Occupational History    Not on file   Social Needs    Financial resource strain: Not very hard    Food insecurity:     Worry: Never true     Inability: Never true    Transportation needs:     Medical: No     Non-medical: No   Tobacco Use    Smoking status: Never Smoker    Smokeless tobacco: Never Used   Substance and Sexual Activity    Alcohol use: Yes     Alcohol/week: 0.6 oz     Types: 1 Glasses of wine per week     Frequency: 2-4 times a month     Drinks per session: 1 or 2     Binge frequency: Less than monthly     Comment: occasionally    Drug use: No    Sexual activity: Not Currently     Partners: Male     Birth control/protection: Condom     Comment: 10/2/17    Lifestyle    Physical activity:     Days per week: 2 days     Minutes per session: 30 min    Stress: Only a little   Relationships    Social connections:     Talks on phone: Twice a week     Gets together: Once a week     Attends [employer] service: Not on file     Active member of club or organization: No     Attends meetings of clubs or organizations: More than 4 times per year     Relationship status:    Other Topics Concern    Not on file   Social History Narrative    Not on file       Current Medications  Current Outpatient Medications on File Prior to Visit   Medication Sig Dispense Refill    aspirin (ECOTRIN) 81 MG EC tablet Take 1 tablet (81 mg total) by mouth once daily.  0    cyanocobalamin (VITAMIN B-12) 100 MCG tablet Take 100 mcg by mouth once daily.      diabetic supplies, miscellan. Kit Please change sensor every 14 days. FreeStyle Armani Sensor 30-day supply (2 sensors/month) 2 kit 5    DULoxetine (CYMBALTA) 60 MG capsule TAKE 1 CAPSULE(60 MG) BY MOUTH EVERY DAY 30 capsule 90    fish oil-omega-3 fatty acids 300-1,000 mg capsule Take by mouth 2 (two) times daily.      fluticasone propionate (FLONASE) 50 mcg/actuation nasal spray 1  spray (50 mcg total) by Each Nare route 2 (two) times daily. 1 Bottle 6    gabapentin (NEURONTIN) 100 MG capsule TAKE 1 TO 2 CAPSULES(100  MG) BY MOUTH EVERY EVENING 60 capsule 3    HUMALOG U-100 INSULIN 100 unit/mL injection Use in insulin pump; max dose 150 units per day. 50 mL 5    insulin pump controller (OMNIPOD DASH PDM KIT MISC) by Misc.(Non-Drug; Combo Route) route.      losartan (COZAAR) 50 MG tablet TAKE 1 TABLET(50 MG) BY MOUTH EVERY DAY 90 tablet 3    magnesium oxide-Mg AA chelate (MG-PLUS-PROTEIN) 133 mg Tab Take 500 mg by mouth every evening.       metFORMIN (GLUCOPHAGE) 1000 MG tablet TAKE 1 TABLET(1000 MG) BY MOUTH TWICE DAILY WITH MEALS 180 tablet 0    nortriptyline (PAMELOR) 50 MG capsule Take 1 capsule (50 mg total) by mouth nightly. 30 capsule 2    pravastatin (PRAVACHOL) 80 MG tablet TAKE 1 TABLET(80 MG) BY MOUTH EVERY DAY 90 tablet 3    promethazine-dextromethorphan (PROMETHAZINE-DM) 6.25-15 mg/5 mL Syrp Take 5ml qhs prn cough 240 mL 3    PROPYLENE GLYCOL//PF (SYSTANE, PF, OPHT) Apply to eye 4 (four) times daily.      vitamin D 1000 units Tab Take 185 mg by mouth 2 (two) times daily.        No current facility-administered medications on file prior to visit.        Allergies   Review of patient's allergies indicates:   Allergen Reactions    Invokana [canagliflozin] Anaphylaxis    Percocet [oxycodone-acetaminophen] Nausea Only and Hallucinations    Biaxin [clarithromycin]     Hydrocodone Other (See Comments)     Dizzy/nausea/hallucinations    Sulfa (sulfonamide antibiotics) Nausea Only and Rash       Review of Systems (Pertinent positives)  Review of Systems   HENT: Positive for hearing loss.    Eyes: Negative for discharge.   Respiratory: Negative for wheezing.    Cardiovascular: Negative for chest pain and palpitations.   Gastrointestinal: Negative for blood in stool, constipation, diarrhea and vomiting.   Genitourinary: Negative for hematuria and urgency.  "  Musculoskeletal: Positive for joint pain and neck pain.   Neurological: Negative for weakness and headaches.   Endo/Heme/Allergies: Negative for polydipsia.         BP (!) 148/91 (BP Location: Right arm, Patient Position: Sitting, BP Method: Medium (Automatic))   Pulse 90   Temp 97.4 °F (36.3 °C) (Oral)   Resp 17   Ht 5' 5" (1.651 m)   Wt 94.7 kg (208 lb 12.4 oz)   SpO2 96%   BMI 34.74 kg/m²     GENERAL APPEARANCE: in no apparent distress and well developed and well nourished  HEENT: PERRL, EOMI, Sclera clear, anicteric, Oropharynx clear, no lesions, Neck supple with midline trachea  NECK: normal, supple, no adenopathy, thyroid normal in size  RESPIRATORY: appears well, vitals normal, no respiratory distress, acyanotic, normal RR, chest clear, no wheezing, crepitations, rhonchi, normal symmetric air entry  HEART: regular rate and rhythm, S1, S2 normal, no murmur, click, rub or gallop.    ABDOMEN: abdomen is soft without tenderness, no masses, no hernias, no organomegaly, no rebound, no guarding. Suprapubic tenderness absent. No CVA tenderness.  SKIN: no rashes, no wounds, no other lesions  PSYCH: Alert, oriented x 3, thought content appropriate, speech normal, pleasant and cooperative, good eye contact, well groomed    Assessment/Plan:  Cortes Schroeder is a 59 y.o. male who presents today for :    Cortes was seen today for follow-up.    Diagnoses and all orders for this visit:    Exposure to HIV  -     emtricitabine-tenofovir 200-300 mg (TRUVADA) 200-300 mg Tab; Take 1 tablet by mouth once daily.      1.  Truvada refilled  2.  Labs to be done today to evaluate HIV status and renal function  3.  Follow up 3 months or sooner if needed    Don Artis MD      "

## 2019-08-01 NOTE — PROGRESS NOTES
CC: This 59 y.o. White male  is here for evaluation of  T2DM along with comorbidities indicated in the Visit Diagnosis section of this encounter.    HPI: Cortes Schroeder was diagnosed with T2DM in 1995. No medications started until 1999 under Dr. Guerrero's care. He was on an [employer] insulin pump under Dr. Marx's care for several years until ~ 2015 but stopped it bc of the cost of insulin pump supplies. Prefers Omnipod over tubed insulin pump bc he got tubing tangled often.     Pt was previously followed by Dr. Mills but transferred to [facility] when she no longer took his insurance.         Prior visit on 2/14/19  a1c up from 9.5 in Oct to 11.5% in January.   He is very upset that he was without any pods x 2 weeks d/t problems with his DME company.   He has been without januvia x 2 weeks at least. Did not call for a refill. Has only been testing glucoses 2.3 times a day. Continues to forget to bolus for meals. In the last 2 weeks, there have been 2 days without any boluses.   He is changing insurance plans. Has been told that Fiasp is covered not humalog.   He states he has tried several times to bolus after entering in his meal amount but gets a rec'd dose of 0 units. States he can override it but doesn't always.   Plan Change from Humalog to Fiasp d/t formulary change.   Make sure to test glucoses more regularly - 4x/day. Before each meal and bedtime.   Maintain balanced diet with 3 meals/day.   Please be sure to bolus prior to each meal. -- suspect this is the biggest reason why his BGs are uncontrolled.   Increase basal rate from 12 am to 5 am to 2.4 u/hr.   May need to follow up with [employer] if you're having issues with insulin delivery before meals. -- should perform an override if he needs to bolus ac.   Return to clinic in 5 months with labs prior.     Interval history  a1c up to 11.7%.     States he has not been getting enough pods to change often enough to bolus ac. He gets only 15 pods/month).  "    Also, he is getting only 3 vials of Fiasp, which is just enough to last him but not enough if he wants to bolus before meals. His latest rx for insulin was for 4 vials for a 30 day supply, but his copay jumps from $30 to $60.  Boluses are large, up to 50 units, and a pod may only last 1.5 days. Always running out of pods or insulin. States that Fiasp is "worthless." He did much better with Humalog but was changed to Fiasp d/t insurance preference. BGs continue to be high with Fiasp. States that Novolog was ineffective as well.      Not testing BGs often because he is frustrated he would not be able to treat high BG anyway d/t limited insulin and/or pod supply.       HOSPITALIZED FOR DIABETES OR RELATED COMPLICATIONS -  Yes -   DKA presumably r/t flu - Feb 2018   DKA 11/2016  DKA 2/2015    PRESCRIBED DIABETES MEDICATIONS: metformin 1000 mg bid, Fiasp in Omnipod pump     Misses medication doses -     Tends to bolus for set meals under 15, 30, 45, 60 grams carbs. He does feel comfortable with carb counting.     Basal rate 2.4 u/hr  ICR 3, ISF 15, goal 120, active insulin time 3 hours     Changes pod every 2 days.     DM COMPLICATIONS: nephropathy and peripheral neuropathy    SIGNIFICANT DIABETES MED HISTORY:   DKA on Invokana in 2/2015   Switched from Novolog 70/30 to toujeo/novolog ~ January 2018   januvia stopped late 2018; it was ineffective     SELF MONITORING BLOOD GLUCOSE: Checks blood glucose at home - none; previously tested 3-4x/day.    HYPOGLYCEMIC EPISODES: none      CURRENT DIET: eats 2 meals/day. Rarely eats 3 meals/day. Usually eats dinner every night.  Loves Taco Tuesday but lately has been eating grilled chicken tacos; has only had a few margaritas in the last several months.     CURRENT EXERCISE: none d/t back pain     SOCIAL: his friend who has DM lives with him.       BP (!) 140/83 (BP Location: Right arm, Patient Position: Sitting, BP Method: Large (Automatic))   Pulse 98   Wt 94.4 kg (208 lb " 1.6 oz)   BMI 34.63 kg/m²       ROS:   CONSTITUTIONAL: Appetite good, + fatigue  MS: + chronic right arm pain; neck pain relieved; followed by Pain Management.       PHYSICAL EXAM:  GENERAL: Well developed, well nourished. No acute distress.   PSYCH: AAOx3, appropriate mood and affect, conversant, well-groomed. Judgement and insight good.   NEURO: Cranial nerves grossly intact. Speech clear, no tremor.   CHEST: Respirations even and unlabored.   FEET: No skin breakdown.       Hemoglobin A1C   Date Value Ref Range Status   07/27/2019 11.7 (H) 4.0 - 5.6 % Final     Comment:     ADA Screening Guidelines:  5.7-6.4%  Consistent with prediabetes  >or=6.5%  Consistent with diabetes  High levels of fetal hemoglobin interfere with the HbA1C  assay. Heterozygous hemoglobin variants (HbS, HgC, etc)do  not significantly interfere with this assay.   However, presence of multiple variants may affect accuracy.     04/27/2019 11.6 (H) 4.0 - 5.6 % Final     Comment:     ADA Screening Guidelines:  5.7-6.4%  Consistent with prediabetes  >or=6.5%  Consistent with diabetes  High levels of fetal hemoglobin interfere with the HbA1C  assay. Heterozygous hemoglobin variants (HbS, HgC, etc)do  not significantly interfere with this assay.   However, presence of multiple variants may affect accuracy.     01/12/2019 11.5 (H) 4.0 - 5.6 % Final     Comment:     ADA Screening Guidelines:  5.7-6.4%  Consistent with prediabetes  >or=6.5%  Consistent with diabetes  High levels of fetal hemoglobin interfere with the HbA1C  assay. Heterozygous hemoglobin variants (HbS, HgC, etc)do  not significantly interfere with this assay.   However, presence of multiple variants may affect accuracy.             Chemistry        Component Value Date/Time     07/27/2019 0830    K 4.2 07/27/2019 0830     07/27/2019 0830    CO2 23 07/27/2019 0830    BUN 10 07/27/2019 0830    CREATININE 1.0 07/27/2019 0830     (H) 07/27/2019 0830        Component  Value Date/Time    CALCIUM 9.3 07/27/2019 0830    ALKPHOS 131 07/27/2019 0830    AST 29 07/27/2019 0830    ALT 41 07/27/2019 0830    BILITOT 0.2 07/27/2019 0830    ESTGFRAFRICA >60.0 07/27/2019 0830    EGFRNONAA >60.0 07/27/2019 0830          Lab Results   Component Value Date    LDLCALC 56.8 (L) 01/12/2019        Ref. Range 1/12/2019 08:42   Cholesterol Latest Ref Range: 120 - 199 mg/dL 148   HDL Latest Ref Range: 40 - 75 mg/dL 45   HDL/Chol Ratio Latest Ref Range: 20.0 - 50.0 % 30.4   LDL Cholesterol Latest Ref Range: 63.0 - 159.0 mg/dL 56.8 (L)   Non-HDL Cholesterol Latest Units: mg/dL 103   Total Cholesterol/HDL Ratio Latest Ref Range: 2.0 - 5.0  3.3   Triglycerides Latest Ref Range: 30 - 150 mg/dL 231 (H)       Lab Results   Component Value Date    MICALBCREAT 4.8 08/08/2018           STANDARDS of CARE:        ASA:               Last eye exam: 4/2018      ASSESSMENT and PLAN:    A1C GOAL: < 7 %       1. Diabetes mellitus type 2, uncontrolled, with complications  Will have to do prior authorization for Humalog since you have tried and failed Fiasp and Novolog. Maximum dose 150 units/day. - rx sent to [facility] pharmacy to help with PA.     Needs to change Omnipods every 1.5 days because he is highly insulin resistant and requires a large amount of insulin. One pod can only hold 200 units. Needs to change his pod when he runs out of insulin.   Please ask DMS to send orders for us to sign.     See Diabetes Educator/Registered Dietician for Freestyle jared training. Pt would also benefit from some diet review.     Monitor blood sugar 4x/day before meals and bedtime.   Return to clinic in 6 weeks.         Ambulatory Referral to Diabetes Education   2. NPDR (nonproliferative diabetic retinopathy)  Improve glycemic control.      3. Essential hypertension  controlled        Spent 40 minutes with patient with >50% time spent in counseling, as noted in # 1-3.            Orders Placed This Encounter   Procedures     Ambulatory Referral to Diabetes Education     Referral Priority:   Routine     Referral Type:   Consultation     Referral Reason:   Specialty Services Required     Requested Specialty:   Endocrinology     Number of Visits Requested:   1     Expiration Date:   8/1/2020        Follow up in about 6 weeks (around 9/12/2019).

## 2019-08-02 ENCOUNTER — TELEPHONE (OUTPATIENT)
Dept: PHARMACY | Facility: CLINIC | Age: 59
End: 2019-08-02

## 2019-08-09 ENCOUNTER — OFFICE VISIT (OUTPATIENT)
Dept: OPTOMETRY | Facility: CLINIC | Age: 59
End: 2019-08-09
Payer: COMMERCIAL

## 2019-08-09 DIAGNOSIS — E11.3292 MILD NONPROLIFERATIVE DIABETIC RETINOPATHY OF LEFT EYE WITHOUT MACULAR EDEMA ASSOCIATED WITH TYPE 2 DIABETES MELLITUS: Primary | ICD-10-CM

## 2019-08-09 DIAGNOSIS — H52.4 HYPEROPIA WITH PRESBYOPIA, BILATERAL: ICD-10-CM

## 2019-08-09 DIAGNOSIS — H25.13 NUCLEAR SCLEROSIS, BILATERAL: ICD-10-CM

## 2019-08-09 DIAGNOSIS — H52.03 HYPEROPIA WITH PRESBYOPIA, BILATERAL: ICD-10-CM

## 2019-08-09 LAB
LEFT EYE DM RETINOPATHY: POSITIVE
RIGHT EYE DM RETINOPATHY: NEGATIVE

## 2019-08-09 PROCEDURE — 92015 PR REFRACTION: ICD-10-PCS | Mod: S$GLB,,, | Performed by: OPTOMETRIST

## 2019-08-09 PROCEDURE — 92014 COMPRE OPH EXAM EST PT 1/>: CPT | Mod: S$GLB,,, | Performed by: OPTOMETRIST

## 2019-08-09 PROCEDURE — 99999 PR PBB SHADOW E&M-EST. PATIENT-LVL III: ICD-10-PCS | Mod: PBBFAC,,, | Performed by: OPTOMETRIST

## 2019-08-09 PROCEDURE — 99999 PR PBB SHADOW E&M-EST. PATIENT-LVL III: CPT | Mod: PBBFAC,,, | Performed by: OPTOMETRIST

## 2019-08-09 PROCEDURE — 92015 DETERMINE REFRACTIVE STATE: CPT | Mod: S$GLB,,, | Performed by: OPTOMETRIST

## 2019-08-09 PROCEDURE — 92014 PR EYE EXAM, EST PATIENT,COMPREHESV: ICD-10-PCS | Mod: S$GLB,,, | Performed by: OPTOMETRIST

## 2019-08-09 NOTE — PROGRESS NOTES
HPI     DLS: 8/10/18  Pt states no VA problems, came in with two RX glasses one for dist and one   for computer,states they still work good for him.   Hx Cats OU  Occ. Floaters, no flashes   No gtts  LBS: didn't check today  Hemoglobin A1C       Date                     Value               Ref Range             Status                07/27/2019               11.7 (H)            4.0 - 5.6 %           Final                    04/27/2019               11.6 (H)            4.0 - 5.6 %           Final                    01/12/2019               11.5 (H)            4.0 - 5.6 %           Final                    Last edited by Pollo Mercedes, OD on 8/9/2019  3:50 PM. (History)        ROS     Positive for: Endocrine (DM)    Negative for: Constitutional, Gastrointestinal, Neurological, Skin,   Genitourinary, Musculoskeletal, HENT, Cardiovascular, Respiratory,   Psychiatric, Allergic/Imm, Heme/Lymph    Last edited by Pollo Mercedes, OD on 8/9/2019  3:50 PM. (History)        Assessment /Plan     For exam results, see Encounter Report.    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 Cats OD>OS--pt happy w spex  2. DM w Mild NPDR OS--stable  3. ELLIS--advised SYS BAL or SOOTHE XP Ats QID/prn    PLAN:    rtc 1 yr

## 2019-08-12 ENCOUNTER — CLINICAL SUPPORT (OUTPATIENT)
Dept: DIABETES | Facility: CLINIC | Age: 59
End: 2019-08-12
Payer: COMMERCIAL

## 2019-08-12 DIAGNOSIS — E11.21 DIABETIC NEPHROPATHY ASSOCIATED WITH TYPE 2 DIABETES MELLITUS: ICD-10-CM

## 2019-08-12 PROCEDURE — G0108 DIAB MANAGE TRN  PER INDIV: HCPCS | Mod: S$GLB,,, | Performed by: DIETITIAN, REGISTERED

## 2019-08-12 PROCEDURE — G0108 PR DIAB MANAGE TRN  PER INDIV: ICD-10-PCS | Mod: S$GLB,,, | Performed by: DIETITIAN, REGISTERED

## 2019-08-12 NOTE — PROGRESS NOTES
Diabetes Education  Author: Roxanna Pang RD  Date: 8/12/2019    Diabetes Care Management Summary  Diabetes Education Record Assessment/Progress: Initial  Current Diabetes Risk Level: Moderate     Last A1c:   Lab Results   Component Value Date    HGBA1C 11.7 (H) 07/27/2019     Last visit with Diabetes Educator: : 08/12/2019    Diabetes Type  Diabetes Type : Type II  Diabetes History  Diabetes Diagnosis: >10 years  Current Treatment: Diet, Insulin, Insulin pump  Reviewed Problem List with Patient: Yes    Health Maintenance was reviewed today with patient. Discussed with patient importance of routine eye exams, foot exams/foot care, blood work (i.e.: A1c, microalbumin, and lipid), dental visits, yearly flu vaccine, and pneumonia vaccine as indicated by PCP. Patient verbalized understanding.     Health Maintenance Topics with due status: Not Due       Topic Last Completion Date    TETANUS VACCINE 10/11/2011    Colonoscopy 07/30/2014    PROSTATE-SPECIFIC ANTIGEN 10/15/2018    Lipid Panel 01/12/2019    Hemoglobin A1c 07/27/2019    Low Dose Statin 08/09/2019    Eye Exam 08/09/2019     Health Maintenance Due   Topic Date Due    Foot Exam  10/03/2019     Nutrition  Meal Planning: 3 meals per day, water, diet drinks, artificial sweeteners, eats out often  What type of beverages do you drink?: diet soda/tea, milk, water, sport drinks  Meal Plan 24 Hour Recall - Breakfast: 615am: Heath biscuit w masterson and cheese, diet coke (est. 40 G CARB)  Meal Plan 24 Hour Recall - Lunch: 1130am-1230pm: microwave meal like complete meals from [employer] w 35 g carb  Meal Plan 24 Hour Recall - Dinner: 6-7pm; home meal like red beans and rice or grilled chicken, 1-1/2 c rice w gravy, 1-1/2 hamburger bun (est 95 g carb) or goes out for Taco Tuesday- 4-5 grilled chix tacos w corn or flour tortillas, chips and salsa, occ wallace and fried ice cream (estimated taco Tues wxou=709f carb)  Meal Plan 24 Hour Recall - Snack: 3pm: 1 serving Dorian  [place] dark chocolate coconut candy ( 26 G CARB)    Monitoring   Monitoring: Other  Self Monitoring : started Free Syle Armani CGM today; only checking Bg manually 2-3x/day  Blood Glucose Logs: No  Do you use a personal continuous glucose monitor?: Yes  What kind of glucose monitor do you use?: Freestyle Armani Flash  In the last month, how often have you had a low blood sugar reaction?: never  Can you tell when your blood sugar is too high?: no    Exercise   Exercise Type: none(back pain)    Current Diabetes Treatment   Current Treatment: Diet, Insulin, Insulin pump    Social History  Preferred Learning Method: Face to Face, Group Education, Reading Materials  Primary Support: Self, Friends  Educational Level: Some College  Occupation: technical help services  Smoking Status: Never a Smoker  Alcohol Use: Weekly  Barriers to Change: None  Learning Challenges : None  Readiness to Learn : Acceptance  Cultural Influences: No    Diabetes Education Assessment/Progress  Diabetes Disease Process (diabetes disease process and treatment options): Discussion, Individual Session  Nutrition (Incorporating nutritional management into one's lifestyle): Individual Session, Written Materials Provided, Instructed, Discussion, Demonstrates Understanding/Competency (verbalizes/demonstrates)  -pt states he has some problems with eating 3 meals /day wing when at work but stated he has tried to add a lunch meal during work week.  -Diet recall notes 2 of 3 meals eaten for more than 1/2 of the week is purchased ready-prepared away from home. Pt has bkfst at fast food, purchases microwave meals or salads at lunch from grocery or work place diner. Dinner half of time may be home cooked and have at least 65-75+ grams carb in each home cooked meal. Pt does not eat lots of veg or fruit and consumes little whole grains or low fat dairy.  -Pt has some adequate knowledge of carbs but did lower estimate  eat-out meal. Discussed using carb  counting apps, the internet/google or pdf labels of foods or restaurant meals to improve carb counting accuracy. Provided pt with list on carb counting and label reading.    Physical Activity (incorporating physical activity into one's lifestyle): Not Covered/Deferred  Medications (states correct name, dose, onset, peak, duration, side effects & timing of meds): Discussion, Individual Session  Monitoring (monitoring blood glucose/other parameters & using results): Individual Session, Demonstration, Instructed, Discussion, Return Demonstration, Demonstrates Understanding/Competency (verbalizes/demonstrates), Video  -pt states he is tired of constantly checking BG  - START AmartusStyle Armani CGM system.  -Reviewed content of kit: reader, 14-day supply sensor (1), and /cord  -Reviewed YouTube video and start-up guide.   -Set up the recorder with the correct date/time and added the glucose range to   -Demonstrated sensor insertion technique and assisted pt in placing the first sensor on his left arm (opposite arm from Omni Pod)  -Scanned the sensor for start up using both the reader and his cellphone  -Reviewed the significance of the information presented on the reader once set-up complete. Informed pt  BG data available in 60 minutes with each new sensor application/insertion, Recommended pt scan sensor before every meal/at least once every 5 hours during day. Informed pt of 8 hour limit on sensor data collection  -Reviewed the components of the data reports, discussed contraindicated exposure to X-rays, MRI, CT scans, and noted affect of Vit C  and Aspirin intake on BG readings  -Explained the replacement process of sensors every 14 days. Suggestions provided on how to prevent unplanned removal of sensor and how to replace   -Pt performed sensor placement and reader set-up plus phone leonard and scanning correctly. Pt reminded to bring reader/phone leonard to clinic visit to download BG records    Acute  Complications (preventing, detecting, and treating acute complications): Discussion, Written Materials Provided, Individual Session  Chronic Complications (preventing, detecting, and treating chronic complications): Discussion, Written Materials Provided, Individual Session  Clinical (diabetes, other pertinent medical history, and relevant comorbidities reviewed during visit): Not Covered/Deferred(time restriction due to CGM start and training)  Cognitive (knowledge of self-management skills, functional health literacy): Not Covered/Deferred(time restriction due to CGM start and training)  Psychosocial (emotional response to diabetes): Not Covered/Deferred(time restriction due to CGM start and training)  Diabetes Distress and Support Systems: Not Covered/Deferred(time restriction due to CGM start and training)  Behavioral (readiness for change, lifestyle practices, self-care behaviors): Not Covered/Deferred(time restriction due to CGM start and training)    Goals  Patient has selected/evaluated goals during today's session: Yes, selected  Healthy Eating: Set(distribute carbs equally into 3 evenly spaced meals/day, 45-60 g/meal)  Start Date: 08/12/19  Target Date: 09/12/19    Diabetes Care Plan/Intervention  Education Plan/Intervention: Individual Follow-Up DSMT, Sensor Start    Diabetes Meal Plan  Restrictions: Restricted Carbohydrate  Carbohydrate Per Meal: 30-45g, 45-60g  Carbohydrate Per Snack : 15-20g    Today's Self-Management Care Plan was developed with the patient's input and is based on barriers identified during today's assessment.    The long and short-term goals in the care plan were written with the patient/caregiver's input. The patient has agreed to work toward these goals to improve his overall diabetes control.      The patient received a copy of today's self-management plan and verbalized understanding of the care plan, goals, and all of today's instructions.      The patient was encouraged to  communicate with his physician and care team regarding his condition(s) and treatment.  I provided the patient with my contact information today and encouraged him to contact me via phone or patient portal as needed.     Education Units of Time   Time Spent: 60 min

## 2019-08-13 ENCOUNTER — OFFICE VISIT (OUTPATIENT)
Dept: FAMILY MEDICINE | Facility: CLINIC | Age: 59
End: 2019-08-13
Payer: COMMERCIAL

## 2019-08-13 VITALS
WEIGHT: 207.69 LBS | SYSTOLIC BLOOD PRESSURE: 126 MMHG | HEIGHT: 65 IN | RESPIRATION RATE: 20 BRPM | OXYGEN SATURATION: 95 % | HEART RATE: 101 BPM | TEMPERATURE: 98 F | BODY MASS INDEX: 34.6 KG/M2 | DIASTOLIC BLOOD PRESSURE: 80 MMHG

## 2019-08-13 DIAGNOSIS — I10 HYPERTENSION, WELL CONTROLLED: ICD-10-CM

## 2019-08-13 DIAGNOSIS — G47.62 NOCTURNAL LEG CRAMPS: ICD-10-CM

## 2019-08-13 DIAGNOSIS — R05.3 CHRONIC COUGH: Primary | ICD-10-CM

## 2019-08-13 PROCEDURE — 3074F PR MOST RECENT SYSTOLIC BLOOD PRESSURE < 130 MM HG: ICD-10-PCS | Mod: CPTII,S$GLB,, | Performed by: FAMILY MEDICINE

## 2019-08-13 PROCEDURE — 3079F DIAST BP 80-89 MM HG: CPT | Mod: CPTII,S$GLB,, | Performed by: FAMILY MEDICINE

## 2019-08-13 PROCEDURE — 3008F BODY MASS INDEX DOCD: CPT | Mod: CPTII,S$GLB,, | Performed by: FAMILY MEDICINE

## 2019-08-13 PROCEDURE — 99214 PR OFFICE/OUTPT VISIT, EST, LEVL IV, 30-39 MIN: ICD-10-PCS | Mod: S$GLB,,, | Performed by: FAMILY MEDICINE

## 2019-08-13 PROCEDURE — 3074F SYST BP LT 130 MM HG: CPT | Mod: CPTII,S$GLB,, | Performed by: FAMILY MEDICINE

## 2019-08-13 PROCEDURE — 3008F PR BODY MASS INDEX (BMI) DOCUMENTED: ICD-10-PCS | Mod: CPTII,S$GLB,, | Performed by: FAMILY MEDICINE

## 2019-08-13 PROCEDURE — 99999 PR PBB SHADOW E&M-EST. PATIENT-LVL V: ICD-10-PCS | Mod: PBBFAC,,, | Performed by: FAMILY MEDICINE

## 2019-08-13 PROCEDURE — 99999 PR PBB SHADOW E&M-EST. PATIENT-LVL V: CPT | Mod: PBBFAC,,, | Performed by: FAMILY MEDICINE

## 2019-08-13 PROCEDURE — 3079F PR MOST RECENT DIASTOLIC BLOOD PRESSURE 80-89 MM HG: ICD-10-PCS | Mod: CPTII,S$GLB,, | Performed by: FAMILY MEDICINE

## 2019-08-13 PROCEDURE — 99214 OFFICE O/P EST MOD 30 MIN: CPT | Mod: S$GLB,,, | Performed by: FAMILY MEDICINE

## 2019-08-13 NOTE — PROGRESS NOTES
Health Maintenance Due   Topic     Foot Exam  Foot prep to be examine today if PCP have time      Zoster: hx chickenpox ; inform pt can get vaccine at pharmacy.

## 2019-08-13 NOTE — PATIENT INSTRUCTIONS
Chronic Cough etiologies to consider:  1) Allergies  2) Acid Reflux  3) Medications  4) Asthma, COPD

## 2019-08-13 NOTE — PROGRESS NOTES
Subjective:       Patient ID: Cortes Schroeder is a 59 y.o. male.    Chief Complaint: Hypertension (follow up ); Diabetes (follow up ); and Diabetic Foot Exam    HPI    Chronic cough - onset 5+ months ago now that has improved with AR treatments, but still persists. A/w some wheezing sounds and SOB with exertion. No f/c/n/v/night sweats. Drinking helps.     Htn - chronic - stable - pt is adherent with his medications.    Chronic leg cramps - intermittently - pt increases his dietary potassium intake when these are bothering him which works intermittently.         Current Outpatient Medications on File Prior to Visit   Medication Sig Dispense Refill    aspirin (ECOTRIN) 81 MG EC tablet Take 1 tablet (81 mg total) by mouth once daily.  0    blood sugar diagnostic (FREESTYLE INSULINX TEST STRIPS) Strp 1 strip by Misc.(Non-Drug; Combo Route) route 4 (four) times daily before meals and nightly. 200 strip 8    cyanocobalamin (VITAMIN B-12) 100 MCG tablet Take 100 mcg by mouth once daily.      diabetic supplies, [employer]. Kit Please change sensor every 14 days. FreeStyle Armani Sensor 30-day supply (2 sensors/month) 2 kit 5    DULoxetine (CYMBALTA) 60 MG capsule TAKE 1 CAPSULE(60 MG) BY MOUTH EVERY DAY 30 capsule 90    emtricitabine-tenofovir 200-300 mg (TRUVADA) 200-300 mg Tab Take 1 tablet by mouth once daily. 30 tablet 2    fish oil-omega-3 fatty acids 300-1,000 mg capsule Take by mouth 2 (two) times daily.      fluticasone propionate (FLONASE) 50 mcg/actuation nasal spray 1 spray (50 mcg total) by Each Nare route 2 (two) times daily. 1 Bottle 6    gabapentin (NEURONTIN) 100 MG capsule TAKE 1 TO 2 CAPSULES(100  MG) BY MOUTH EVERY EVENING 60 capsule 3    HUMALOG U-100 INSULIN 100 unit/mL injection Use in insulin pump; max dose 150 units per day. 50 mL 5    insulin pump controller (OMNIPOD DASH PDM KIT MISC) by Misc.(Non-Drug; Combo Route) route.      losartan (COZAAR) 50 MG tablet TAKE 1 TABLET(50 MG)  BY MOUTH EVERY DAY 90 tablet 3    magnesium oxide-Mg AA chelate (MG-PLUS-PROTEIN) 133 mg Tab Take 500 mg by mouth every evening.       metFORMIN (GLUCOPHAGE) 1000 MG tablet TAKE 1 TABLET(1000 MG) BY MOUTH TWICE DAILY WITH MEALS 180 tablet 0    nortriptyline (PAMELOR) 50 MG capsule Take 1 capsule (50 mg total) by mouth nightly. 30 capsule 2    pravastatin (PRAVACHOL) 80 MG tablet TAKE 1 TABLET(80 MG) BY MOUTH EVERY DAY 90 tablet 3    promethazine-dextromethorphan (PROMETHAZINE-DM) 6.25-15 mg/5 mL Syrp Take 5ml qhs prn cough 240 mL 3    PROPYLENE GLYCOL//PF (SYSTANE, PF, OPHT) Apply to eye 4 (four) times daily.      vitamin D 1000 units Tab Take 185 mg by mouth 2 (two) times daily.       [DISCONTINUED] acetaminophen (TYLENOL) 160 mg/5 mL (5 mL) Soln        No current facility-administered medications on file prior to visit.        Past Medical History:   Diagnosis Date    Anxiety 12/18/2012    Back pain 12/18/2012    Cataract     Chronic pain syndrome 4/24/2016    Diabetes type 2, controlled 2/20/2016    Diabetic retinopathy     DM (diabetes mellitus) 12/18/2012    DM (diabetes mellitus), type 2, uncontrolled 11/16/2013    Essential hypertension 2/20/2016    Gastroesophageal reflux disease without esophagitis 2/20/2016    Hyperlipidemia 12/18/2012    Insomnia 8/7/2014    Neuropathy 11/16/2013       Family History   Problem Relation Age of Onset    Cataracts Father     Hypertension Father     Parkinsonism Father     Alzheimer's disease Father     Migraines Mother     Hypertension Mother     Heart attack Mother     Amblyopia Neg Hx     Blindness Neg Hx     Glaucoma Neg Hx     Macular degeneration Neg Hx     Retinal detachment Neg Hx     Strabismus Neg Hx         reports that he has never smoked. He has never used smokeless tobacco. He reports that he drinks about 0.6 oz of alcohol per week. He reports that he does not use drugs.    Review of Systems   Respiratory: Positive for  cough, shortness of breath and wheezing.      See hpi  Objective:     Vitals:    08/13/19 1458   BP: 126/80   Pulse: 101   Resp: 20   Temp: 98.3 °F (36.8 °C)        Physical Exam   Constitutional: He appears well-developed. No distress.   O   HENT:   Head: Normocephalic and atraumatic.   Eyes: Conjunctivae are normal. No scleral icterus.   Pulmonary/Chest: Effort normal.   Neurological: He is alert.   Psychiatric: He has a normal mood and affect. His behavior is normal.   Nursing note and vitals reviewed.      Assessment:       1. Chronic cough    2. Hypertension, well controlled    3. Nocturnal leg cramps        Plan:       Cortes was seen today for hypertension, diabetes and diabetic foot exam.    Diagnoses and all orders for this visit:    Chronic cough  -     X-Ray Chest PA And Lateral; Future  -     Complete PFT with bronchodilator; Future  -     Ambulatory referral to ENT  New dx - pt educated on disease etiology, prognosis and treatment. Answered pts questions.    AR tx have not completely resolved his sxs. Will begin further workup for crhonic cough as above.     Hypertension, well controlled  - Chronic - stable     Pt is doing well on current therapy. No side effects noted. Will continue current therapy.    Nocturnal leg cramps    Chronic - supportive care.           Follow up for Hypertension, Diabetes Type 2.        Pt verbalized understanding and agreed with our plan.

## 2019-08-14 ENCOUNTER — PATIENT MESSAGE (OUTPATIENT)
Dept: ENDOCRINOLOGY | Facility: CLINIC | Age: 59
End: 2019-08-14

## 2019-08-16 ENCOUNTER — TELEPHONE (OUTPATIENT)
Dept: ENDOCRINOLOGY | Facility: CLINIC | Age: 59
End: 2019-08-16

## 2019-08-16 NOTE — TELEPHONE ENCOUNTER
----- Message from Treva Hurtado sent at 8/16/2019  9:42 AM CDT -----  Contact: Sari - diabetic man  Sari calling to speak to Tania regarding pt health. [phone] ex [phone]

## 2019-08-22 ENCOUNTER — TELEPHONE (OUTPATIENT)
Dept: ADMINISTRATIVE | Facility: HOSPITAL | Age: 59
End: 2019-08-22

## 2019-08-30 DIAGNOSIS — G89.4 CHRONIC PAIN SYNDROME: ICD-10-CM

## 2019-08-30 DIAGNOSIS — F32.A DEPRESSION, UNSPECIFIED DEPRESSION TYPE: ICD-10-CM

## 2019-08-30 RX ORDER — DULOXETIN HYDROCHLORIDE 60 MG/1
CAPSULE, DELAYED RELEASE ORAL
Qty: 30 CAPSULE | Refills: 2 | Status: SHIPPED | OUTPATIENT
Start: 2019-08-30 | End: 2019-12-02 | Stop reason: SDUPTHER

## 2019-09-05 ENCOUNTER — TELEPHONE (OUTPATIENT)
Dept: OTOLARYNGOLOGY | Facility: CLINIC | Age: 59
End: 2019-09-05

## 2019-09-05 NOTE — TELEPHONE ENCOUNTER
Called patient and left message regarding an appointment with Dr. Cobian for Cough? Trying to find out what other symptoms does he have to warrant seeing Dr. Cobian.

## 2019-09-09 ENCOUNTER — HOSPITAL ENCOUNTER (OUTPATIENT)
Dept: RADIOLOGY | Facility: HOSPITAL | Age: 59
Discharge: HOME | End: 2019-09-09
Attending: FAMILY MEDICINE
Payer: COMMERCIAL

## 2019-09-09 DIAGNOSIS — R05.3 CHRONIC COUGH: ICD-10-CM

## 2019-09-09 PROCEDURE — 71046 X-RAY EXAM CHEST 2 VIEWS: CPT | Mod: 26,,, | Performed by: RADIOLOGY

## 2019-09-09 PROCEDURE — 71046 XR CHEST PA AND LATERAL: ICD-10-PCS | Mod: 26,,, | Performed by: RADIOLOGY

## 2019-09-09 PROCEDURE — 71046 X-RAY EXAM CHEST 2 VIEWS: CPT | Mod: TC,FY

## 2019-09-10 ENCOUNTER — PATIENT OUTREACH (OUTPATIENT)
Dept: ADMINISTRATIVE | Facility: OTHER | Age: 59
End: 2019-09-10

## 2019-09-12 ENCOUNTER — OFFICE VISIT (OUTPATIENT)
Dept: OTOLARYNGOLOGY | Facility: CLINIC | Age: 59
End: 2019-09-12
Payer: COMMERCIAL

## 2019-09-12 VITALS
DIASTOLIC BLOOD PRESSURE: 70 MMHG | SYSTOLIC BLOOD PRESSURE: 130 MMHG | BODY MASS INDEX: 34.1 KG/M2 | WEIGHT: 204.69 LBS | HEIGHT: 65 IN

## 2019-09-12 DIAGNOSIS — R09.82 POST-NASAL DRAINAGE: Primary | ICD-10-CM

## 2019-09-12 DIAGNOSIS — R09.81 NASAL CONGESTION: ICD-10-CM

## 2019-09-12 PROCEDURE — 3008F BODY MASS INDEX DOCD: CPT | Mod: CPTII,S$GLB,, | Performed by: OTOLARYNGOLOGY

## 2019-09-12 PROCEDURE — 3075F SYST BP GE 130 - 139MM HG: CPT | Mod: CPTII,S$GLB,, | Performed by: OTOLARYNGOLOGY

## 2019-09-12 PROCEDURE — 3075F PR MOST RECENT SYSTOLIC BLOOD PRESS GE 130-139MM HG: ICD-10-PCS | Mod: CPTII,S$GLB,, | Performed by: OTOLARYNGOLOGY

## 2019-09-12 PROCEDURE — 99204 OFFICE O/P NEW MOD 45 MIN: CPT | Mod: S$GLB,,, | Performed by: OTOLARYNGOLOGY

## 2019-09-12 PROCEDURE — 3078F DIAST BP <80 MM HG: CPT | Mod: CPTII,S$GLB,, | Performed by: OTOLARYNGOLOGY

## 2019-09-12 PROCEDURE — 3008F PR BODY MASS INDEX (BMI) DOCUMENTED: ICD-10-PCS | Mod: CPTII,S$GLB,, | Performed by: OTOLARYNGOLOGY

## 2019-09-12 PROCEDURE — 99204 PR OFFICE/OUTPT VISIT, NEW, LEVL IV, 45-59 MIN: ICD-10-PCS | Mod: S$GLB,,, | Performed by: OTOLARYNGOLOGY

## 2019-09-12 PROCEDURE — 3078F PR MOST RECENT DIASTOLIC BLOOD PRESSURE < 80 MM HG: ICD-10-PCS | Mod: CPTII,S$GLB,, | Performed by: OTOLARYNGOLOGY

## 2019-09-12 NOTE — LETTER
September 17, 2019      Hira Acosta MD  [address]  [facility] [address]           [facility]  [address] [facility] [address]  [place] [address]  Phone: [phone]          Patient: Cortes Schroeder   MR Number: [id number]   YOB: 1960   Date of Visit: 9/12/2019       Dear Dr. Hira Acosta:    Thank you for referring Cortes Schroeder to me for evaluation. Attached you will find relevant portions of my assessment and plan of care.    If you have questions, please do not hesitate to call me. I look forward to following Cortes Schroeder along with you.    Sincerely,    Collin Bennett  CC:  No Recipients    If you would like to receive this communication electronically, please contact [email or web address] or [phone] to request more information on [facility] Link access.    For providers and/or their staff who would like to refer a patient to [facility], please contact us through our one-stop-shop provider referral line, [facility], at [phone].    If you feel you have received this communication in error or would no longer like to receive these types of communications, please e-mail [email or web address]

## 2019-09-20 NOTE — PROGRESS NOTES
Chief Complaint   Patient presents with    Sinusitis    Cough         59 y.o. male presents with 9 month history of post nasal drainage and cough. No facial pain or pressure. No tooth pain. Has used Flonase and Mucinex prn with improvement.      Review of Systems     Constitutional: Negative for fatigue and unexpected weight change.   HENT: per HPI.  Eyes: Negative for visual disturbance.   Respiratory: Negative for shortness of breath, hemoptysis  Cardiovascular: Negative for chest pain and palpitations.   Genitourinary: Negative for dysuria and difficulty urinating.   Musculoskeletal: Negative for decreased ROM, back pain.   Skin: Negative for rash, sunburn, itching.   Neurological: Negative for dizziness and seizures.   Hematological: Negative for adenopathy. Does not bruise/bleed easily.   Psychiatric/Behavioral: Negative for agitation. The patient is not nervous/anxious.   Endocrine: Negative for rapid weight loss/weight gain, heat/cold intolerance.     Past Medical History:   Diagnosis Date    Anxiety 12/18/2012    Back pain 12/18/2012    Cataract     Chronic pain syndrome 4/24/2016    Diabetes type 2, controlled 2/20/2016    Diabetic retinopathy     DM (diabetes mellitus) 12/18/2012    DM (diabetes mellitus), type 2, uncontrolled 11/16/2013    Essential hypertension 2/20/2016    Gastroesophageal reflux disease without esophagitis 2/20/2016    Hyperlipidemia 12/18/2012    Insomnia 8/7/2014    Neuropathy 11/16/2013       Past Surgical History:   Procedure Laterality Date    BACK SURGERY  2003    Lumbar Spine    Injection, Steroid, Epidural N/A 2/20/2019    Performed by Andrew Sinclair Jr[facility]    Injection, Steroid, Epidural Cervical N/A 1/16/2019    Performed by Andrew Sinclair Jr., MD at [facility]       family history includes Alzheimer's disease in his father; Cataracts in his father; Heart attack in his mother; Hypertension in his father and mother; Migraines in his  mother; Parkinsonism in his father.    Pt  reports that he has never smoked. He has never used smokeless tobacco. He reports that he drinks about 0.6 oz of alcohol per week. He reports that he does not use drugs.    Review of patient's allergies indicates:   Allergen Reactions    Invokana [canagliflozin] Anaphylaxis    Percocet [oxycodone-acetaminophen] Nausea Only and Hallucinations    Biaxin [clarithromycin]     Hydrocodone Other (See Comments)     Dizzy/nausea/hallucinations    Sulfa (sulfonamide antibiotics) Nausea Only and Rash        Physical Exam    Vitals:    09/12/19 0851   BP: 130/70     Body mass index is 34.06 kg/m².    Physical Exam   Constitutional: He is oriented to person, place, and time. He appears well-developed and well-nourished. No distress.   HENT:   Head: Normocephalic and atraumatic.   Right Ear: Hearing, tympanic membrane, external ear and ear canal normal. Tympanic membrane mobility is normal. No middle ear effusion. No decreased hearing is noted.   Left Ear: Hearing, tympanic membrane, external ear and ear canal normal. Tympanic membrane mobility is normal.  No middle ear effusion. No decreased hearing is noted.   Nose: Nose normal.   Mouth/Throat: Oropharynx is clear and moist.   Bilateral nasal cavities inspected, no polyps or purulent fluid seen.   Eyes: Pupils are equal, round, and reactive to light. Conjunctivae, EOM and lids are normal. Right eye exhibits no discharge. Left eye exhibits no discharge.   Neck: Trachea normal, normal range of motion and phonation normal. Neck supple. No tracheal tenderness present. No tracheal deviation, no edema and no erythema present. No thyroid mass and no thyromegaly present.   Cardiovascular: Normal heart sounds.   Pulmonary/Chest: Breath sounds normal. No stridor.   Abdominal: Soft.   Lymphadenopathy:     He has no cervical adenopathy.   Neurological: He is alert and oriented to person, place, and time.   Skin: Skin is warm and dry. No rash  noted. He is not diaphoretic. No erythema. No pallor.   Psychiatric: He has a normal mood and affect.   Nursing note and vitals reviewed.        Assessment     1. Post-nasal drainage    2. Nasal congestion          Plan  In summary, Mr. Schroeder is a 59 year old male with PND. Recommend consistent nasal regimen with Flonase and nasal saline for improvement mucociliary clearance as well as Mucinex prn. All questions were answered. Return to clinic if symptoms fail to improve or worsen.

## 2019-09-26 ENCOUNTER — PATIENT OUTREACH (OUTPATIENT)
Dept: ADMINISTRATIVE | Facility: OTHER | Age: 59
End: 2019-09-26

## 2019-09-30 ENCOUNTER — CLINICAL SUPPORT (OUTPATIENT)
Dept: FAMILY MEDICINE | Facility: CLINIC | Age: 59
End: 2019-09-30
Payer: COMMERCIAL

## 2019-09-30 ENCOUNTER — OFFICE VISIT (OUTPATIENT)
Dept: ENDOCRINOLOGY | Facility: CLINIC | Age: 59
End: 2019-09-30
Payer: COMMERCIAL

## 2019-09-30 VITALS
WEIGHT: 205.88 LBS | BODY MASS INDEX: 34.26 KG/M2 | SYSTOLIC BLOOD PRESSURE: 135 MMHG | HEART RATE: 103 BPM | DIASTOLIC BLOOD PRESSURE: 88 MMHG

## 2019-09-30 DIAGNOSIS — I10 ESSENTIAL HYPERTENSION: ICD-10-CM

## 2019-09-30 DIAGNOSIS — Z71.9 VISIT FOR COUNSELING: ICD-10-CM

## 2019-09-30 DIAGNOSIS — E11.3299 NPDR (NONPROLIFERATIVE DIABETIC RETINOPATHY): ICD-10-CM

## 2019-09-30 DIAGNOSIS — Z23 FLU VACCINE NEED: ICD-10-CM

## 2019-09-30 DIAGNOSIS — E78.1 HYPERTRIGLYCERIDEMIA: ICD-10-CM

## 2019-09-30 DIAGNOSIS — Z23 FLU VACCINE NEED: Primary | ICD-10-CM

## 2019-09-30 PROCEDURE — 99999 PR PBB SHADOW E&M-EST. PATIENT-LVL V: CPT | Mod: PBBFAC,,, | Performed by: NURSE PRACTITIONER

## 2019-09-30 PROCEDURE — 95251 PR GLUCOSE MONITOR, 72 HOUR, PHYS INTERP: ICD-10-PCS | Mod: S$GLB,,, | Performed by: NURSE PRACTITIONER

## 2019-09-30 PROCEDURE — 99999 PR PBB SHADOW E&M-EST. PATIENT-LVL V: ICD-10-PCS | Mod: PBBFAC,,, | Performed by: NURSE PRACTITIONER

## 2019-09-30 PROCEDURE — 90686 IIV4 VACC NO PRSV 0.5 ML IM: CPT | Mod: S$GLB,,, | Performed by: NURSE PRACTITIONER

## 2019-09-30 PROCEDURE — 3046F PR MOST RECENT HEMOGLOBIN A1C LEVEL > 9.0%: ICD-10-PCS | Mod: CPTII,S$GLB,, | Performed by: NURSE PRACTITIONER

## 2019-09-30 PROCEDURE — 3008F PR BODY MASS INDEX (BMI) DOCUMENTED: ICD-10-PCS | Mod: CPTII,S$GLB,, | Performed by: NURSE PRACTITIONER

## 2019-09-30 PROCEDURE — 99499 NO LOS: ICD-10-PCS | Mod: S$GLB,,, | Performed by: FAMILY MEDICINE

## 2019-09-30 PROCEDURE — 95251 CONT GLUC MNTR ANALYSIS I&R: CPT | Mod: S$GLB,,, | Performed by: NURSE PRACTITIONER

## 2019-09-30 PROCEDURE — 99499 UNLISTED E&M SERVICE: CPT | Mod: S$GLB,,, | Performed by: FAMILY MEDICINE

## 2019-09-30 PROCEDURE — 3079F PR MOST RECENT DIASTOLIC BLOOD PRESSURE 80-89 MM HG: ICD-10-PCS | Mod: CPTII,S$GLB,, | Performed by: NURSE PRACTITIONER

## 2019-09-30 PROCEDURE — 3008F BODY MASS INDEX DOCD: CPT | Mod: CPTII,S$GLB,, | Performed by: NURSE PRACTITIONER

## 2019-09-30 PROCEDURE — 3075F PR MOST RECENT SYSTOLIC BLOOD PRESS GE 130-139MM HG: ICD-10-PCS | Mod: CPTII,S$GLB,, | Performed by: NURSE PRACTITIONER

## 2019-09-30 PROCEDURE — 90686 FLU VACCINE (QUAD) GREATER THAN OR EQUAL TO 3YO PRESERVATIVE FREE IM: ICD-10-PCS | Mod: S$GLB,,, | Performed by: NURSE PRACTITIONER

## 2019-09-30 PROCEDURE — 90471 FLU VACCINE (QUAD) GREATER THAN OR EQUAL TO 3YO PRESERVATIVE FREE IM: ICD-10-PCS | Mod: S$GLB,,, | Performed by: NURSE PRACTITIONER

## 2019-09-30 PROCEDURE — 99215 OFFICE O/P EST HI 40 MIN: CPT | Mod: 25,S$GLB,, | Performed by: NURSE PRACTITIONER

## 2019-09-30 PROCEDURE — 90471 IMMUNIZATION ADMIN: CPT | Mod: S$GLB,,, | Performed by: NURSE PRACTITIONER

## 2019-09-30 PROCEDURE — 3046F HEMOGLOBIN A1C LEVEL >9.0%: CPT | Mod: CPTII,S$GLB,, | Performed by: NURSE PRACTITIONER

## 2019-09-30 PROCEDURE — 3079F DIAST BP 80-89 MM HG: CPT | Mod: CPTII,S$GLB,, | Performed by: NURSE PRACTITIONER

## 2019-09-30 PROCEDURE — 3075F SYST BP GE 130 - 139MM HG: CPT | Mod: CPTII,S$GLB,, | Performed by: NURSE PRACTITIONER

## 2019-09-30 PROCEDURE — 99215 PR OFFICE/OUTPT VISIT, EST, LEVL V, 40-54 MIN: ICD-10-PCS | Mod: 25,S$GLB,, | Performed by: NURSE PRACTITIONER

## 2019-09-30 NOTE — PROGRESS NOTES
Pt tolerated injection(flu), given information sheet verbalized understanding and instructed to wait 15 minutes post injection.

## 2019-09-30 NOTE — PROGRESS NOTES
"CC: This 59 y.o. White male  is here for evaluation of  T2DM along with comorbidities indicated in the Visit Diagnosis section of this encounter.    HPI: Cortes Schroeder was diagnosed with T2DM in 1995. No medications started until 1999 under Dr. Guerrero's care. He was on an [place] insulin pump under Dr. Marx's care for several years until ~ 2015 but stopped it bc of the cost of insulin pump supplies. Prefers Omnipod over tubed insulin pump bc he got tubing tangled often.     Pt was previously followed by Dr. Mills but transferred to [facility] when she no longer took his insurance.         Prior visit 8/1/19  a1c up to 11.7%.   States he has not been getting enough pods to change often enough to bolus ac. He gets only 15 pods/month).   Also, he is getting only 3 vials of Fiasp, which is just enough to last him but not enough if he wants to bolus before meals. His latest rx for insulin was for 4 vials for a 30 day supply, but his copay jumps from $30 to $60.  Boluses are large, up to 50 units, and a pod may only last 1.5 days. Always running out of pods or insulin. States that Fiasp is "worthless." He did much better with Humalog but was changed to Fiasp d/t insurance preference. BGs continue to be high with Fiasp. States that Novolog was ineffective as well.    Not testing BGs often because he is frustrated he would not be able to treat high BG anyway d/t limited insulin and/or pod supply.   Plan Will have to do prior authorization for Humalog since you have tried and failed Fiasp and Novolog. Maximum dose 150 units/day. - rx sent to [facility] pharmacy to help with PA.   Needs to change Omnipods every 1.5 days because he is highly insulin resistant and requires a large amount of insulin. One pod can only hold 200 units. Needs to change his pod when he runs out of insulin.   Please ask DMS to send orders for us to sign.   See Diabetes Educator/Registered Dietician for Freestyle jared training. Pt would also benefit " from some diet review.   Monitor blood sugar 4x/day before meals and bedtime.   Return to clinic in 6 weeks.     Interval history  He has switched to Humalog and finds his BGs better compared to Fiasp and Novolog.  He is using Freestyle [employer]ani CGM and enjoys it. However, he is not testing BGs consistently every day. See [employer] report in Media.   Continues not to bolus prior to most meals. He went 3 days last weekend without any boluses at all. Tends to deliver correction boluses but not food boluses.       HOSPITALIZED FOR DIABETES OR RELATED COMPLICATIONS -  Yes -   DKA presumably r/t flu - Feb 2018   DKA 11/2016  DKA 2/2015    PRESCRIBED DIABETES MEDICATIONS: metformin 1000 mg bid, Humalog in Omnipod pump     Misses medication doses - YES     Tends to bolus for set meals under 15, 30, 45, 60 grams carbs. He does feel comfortable with carb counting.     Basal rate  12 AM - 2.8 u/hr  5 am -  2.4 u/hr  ICR 3, ISF 15, goal 120, active insulin time 3 hours     Changes pod every 1.5 days.     DM COMPLICATIONS: nephropathy and peripheral neuropathy    SIGNIFICANT DIABETES MED HISTORY:   DKA on Invokana in 2/2015   Switched from Novolog 70/30 to toujeo/novolog ~ January 2018   januvia stopped late 2018; it was ineffective     SELF MONITORING BLOOD GLUCOSE: Checks blood glucose at home - 0-4x/day.   CGM interpretation: Glucoses mostly high d/t lack of insulin delivery prior to eating.     HYPOGLYCEMIC EPISODES: none      CURRENT DIET: eats 2 meals/day. Rarely eats 3 meals/day. Usually eats dinner every night.  Loves [employer] Tuesday.     CURRENT EXERCISE: none d/t back pain     SOCIAL: his friend who has DM lives with him.       /88 (BP Location: Right arm, Patient Position: Sitting, BP Method: X-Large (Automatic))   Pulse 103   Wt 93.4 kg (205 lb 14.4 oz)   BMI 34.26 kg/m²       ROS:   CONSTITUTIONAL: Appetite good, + fatigue        PHYSICAL EXAM:  GENERAL: Well developed, well nourished. No acute distress.   PSYCH:  AAOx3, appropriate mood and affect, conversant, well-groomed. Judgement and insight good.   NEURO: Cranial nerves grossly intact. Speech clear, no tremor.   CHEST: Respirations even and unlabored.   FEET: No skin breakdown.       Hemoglobin A1C   Date Value Ref Range Status   07/27/2019 11.7 (H) 4.0 - 5.6 % Final     Comment:     ADA Screening Guidelines:  5.7-6.4%  Consistent with prediabetes  >or=6.5%  Consistent with diabetes  High levels of fetal hemoglobin interfere with the HbA1C  assay. Heterozygous hemoglobin variants (HbS, HgC, etc)do  not significantly interfere with this assay.   However, presence of multiple variants may affect accuracy.     04/27/2019 11.6 (H) 4.0 - 5.6 % Final     Comment:     ADA Screening Guidelines:  5.7-6.4%  Consistent with prediabetes  >or=6.5%  Consistent with diabetes  High levels of fetal hemoglobin interfere with the HbA1C  assay. Heterozygous hemoglobin variants (HbS, HgC, etc)do  not significantly interfere with this assay.   However, presence of multiple variants may affect accuracy.     01/12/2019 11.5 (H) 4.0 - 5.6 % Final     Comment:     ADA Screening Guidelines:  5.7-6.4%  Consistent with prediabetes  >or=6.5%  Consistent with diabetes  High levels of fetal hemoglobin interfere with the HbA1C  assay. Heterozygous hemoglobin variants (HbS, HgC, etc)do  not significantly interfere with this assay.   However, presence of multiple variants may affect accuracy.             Chemistry        Component Value Date/Time     07/27/2019 0830    K 4.2 07/27/2019 0830     07/27/2019 0830    CO2 23 07/27/2019 0830    BUN 10 07/27/2019 0830    CREATININE 1.0 07/27/2019 0830     (H) 07/27/2019 0830        Component Value Date/Time    CALCIUM 9.3 07/27/2019 0830    ALKPHOS 131 07/27/2019 0830    AST 29 07/27/2019 0830    ALT 41 07/27/2019 0830    BILITOT 0.2 07/27/2019 0830    ESTGFRAFRICA >60.0 07/27/2019 0830    EGFRNONAA >60.0 07/27/2019 0830          Lab Results    Component Value Date    LDLCALC 56.8 (L) 01/12/2019        Ref. Range 1/12/2019 08:42   Cholesterol Latest Ref Range: 120 - 199 mg/dL 148   HDL Latest Ref Range: 40 - 75 mg/dL 45   HDL/Chol Ratio Latest Ref Range: 20.0 - 50.0 % 30.4   LDL Cholesterol Latest Ref Range: 63.0 - 159.0 mg/dL 56.8 (L)   Non-HDL Cholesterol Latest Units: mg/dL 103   Total Cholesterol/HDL Ratio Latest Ref Range: 2.0 - 5.0  3.3   Triglycerides Latest Ref Range: 30 - 150 mg/dL 231 (H)       Lab Results   Component Value Date    MICALBCREAT 4.8 08/08/2018           STANDARDS of CARE:        ASA:               Last eye exam: 4/2018      ASSESSMENT and PLAN:    A1C GOAL: < 7 %       1. Diabetes mellitus type 2, uncontrolled, with complications  Matthias discussion with patient - this has been the same barrier to DM control since initial visit in Endocrine:   Must improve bolus adherence.   Must monitor blood sugar 4x/day every day in order to improve blood glucose control and also to continue receiving insulin pump and CGM supplies.     Return to clinic in 3 months with labs prior.         Hemoglobin A1c    Microalbumin/creatinine urine ratio   2. NPDR (nonproliferative diabetic retinopathy)  Improve glycemic control.      3. Essential hypertension  Continue current tx    4. Hypertriglyceridemia  Lipid panel   5. Flu vaccine need  Influenza - Quadrivalent (6 months+) (PF)        Spent 40 minutes with patient with >50% time spent in counseling, as noted in # 1-3.            Orders Placed This Encounter   Procedures    Influenza - Quadrivalent (6 months+) (PF)    Hemoglobin A1c     Standing Status:   Future     Standing Expiration Date:   11/28/2020    Microalbumin/creatinine urine ratio     Standing Status:   Future     Standing Expiration Date:   11/28/2020     Order Specific Question:   Specimen Source     Answer:   Urine    Lipid panel     Standing Status:   Future     Standing Expiration Date:   9/29/2020        No follow-ups on file.

## 2019-09-30 NOTE — PATIENT INSTRUCTIONS
Must improve bolus adherence.   Must monitor blood sugar 4x/day every day in order to improve blood glucose control and also to continue receiving insulin pump and CGM supplies.     Return to clinic in 3 months with labs prior.

## 2019-10-07 DIAGNOSIS — Z79.4 UNCONTROLLED TYPE 2 DIABETES MELLITUS WITH HYPERGLYCEMIA, WITH LONG-TERM CURRENT USE OF INSULIN: ICD-10-CM

## 2019-10-07 DIAGNOSIS — E11.65 UNCONTROLLED TYPE 2 DIABETES MELLITUS WITH HYPERGLYCEMIA, WITH LONG-TERM CURRENT USE OF INSULIN: ICD-10-CM

## 2019-10-07 RX ORDER — METFORMIN HYDROCHLORIDE 1000 MG/1
TABLET ORAL
Qty: 180 TABLET | Refills: 2 | Status: SHIPPED | OUTPATIENT
Start: 2019-10-07 | End: 2020-04-13

## 2019-10-13 ENCOUNTER — PATIENT MESSAGE (OUTPATIENT)
Dept: FAMILY MEDICINE | Facility: CLINIC | Age: 59
End: 2019-10-13

## 2019-10-13 DIAGNOSIS — E11.42 DIABETIC PERIPHERAL NEUROPATHY ASSOCIATED WITH TYPE 2 DIABETES MELLITUS: ICD-10-CM

## 2019-10-14 RX ORDER — GABAPENTIN 100 MG/1
CAPSULE ORAL
Qty: 60 CAPSULE | Refills: 3 | Status: SHIPPED | OUTPATIENT
Start: 2019-10-14 | End: 2020-01-03

## 2019-10-18 DIAGNOSIS — E11.42 DIABETIC PERIPHERAL NEUROPATHY ASSOCIATED WITH TYPE 2 DIABETES MELLITUS: ICD-10-CM

## 2019-10-18 RX ORDER — GABAPENTIN 100 MG/1
CAPSULE ORAL
Qty: 60 CAPSULE | Refills: 3 | OUTPATIENT
Start: 2019-10-18

## 2019-10-29 DIAGNOSIS — I10 HYPERTENSION, UNCONTROLLED: ICD-10-CM

## 2019-10-29 RX ORDER — LOSARTAN POTASSIUM 50 MG/1
TABLET ORAL
Qty: 90 TABLET | Refills: 3 | Status: SHIPPED | OUTPATIENT
Start: 2019-10-29 | End: 2020-11-10 | Stop reason: SDUPTHER

## 2019-11-08 ENCOUNTER — HOSPITAL ENCOUNTER (OUTPATIENT)
Dept: RADIOLOGY | Facility: HOSPITAL | Age: 59
Discharge: HOME | End: 2019-11-08
Attending: UROLOGY
Payer: COMMERCIAL

## 2019-11-08 DIAGNOSIS — N20.0 KIDNEY STONES: ICD-10-CM

## 2019-11-08 PROCEDURE — 76770 US EXAM ABDO BACK WALL COMP: CPT | Mod: 26,,, | Performed by: RADIOLOGY

## 2019-11-08 PROCEDURE — 76770 US EXAM ABDO BACK WALL COMP: CPT | Mod: TC

## 2019-11-08 PROCEDURE — 76770 US RETROPERITONEAL COMPLETE: ICD-10-PCS | Mod: 26,,, | Performed by: RADIOLOGY

## 2019-11-10 ENCOUNTER — PATIENT OUTREACH (OUTPATIENT)
Dept: ADMINISTRATIVE | Facility: OTHER | Age: 59
End: 2019-11-10

## 2019-11-13 ENCOUNTER — OFFICE VISIT (OUTPATIENT)
Dept: UROLOGY | Facility: CLINIC | Age: 59
End: 2019-11-13
Payer: COMMERCIAL

## 2019-11-13 VITALS
DIASTOLIC BLOOD PRESSURE: 82 MMHG | BODY MASS INDEX: 34.75 KG/M2 | SYSTOLIC BLOOD PRESSURE: 132 MMHG | WEIGHT: 208.56 LBS | HEIGHT: 65 IN

## 2019-11-13 DIAGNOSIS — E29.1 HYPOGONADISM MALE: ICD-10-CM

## 2019-11-13 DIAGNOSIS — N20.0 KIDNEY STONES: ICD-10-CM

## 2019-11-13 DIAGNOSIS — N52.9 ED (ERECTILE DYSFUNCTION) OF ORGANIC ORIGIN: ICD-10-CM

## 2019-11-13 DIAGNOSIS — N40.0 BPH WITHOUT OBSTRUCTION/LOWER URINARY TRACT SYMPTOMS: Primary | ICD-10-CM

## 2019-11-13 PROCEDURE — 99214 PR OFFICE/OUTPT VISIT, EST, LEVL IV, 30-39 MIN: ICD-10-PCS | Mod: S$GLB,,, | Performed by: UROLOGY

## 2019-11-13 PROCEDURE — 3008F BODY MASS INDEX DOCD: CPT | Mod: CPTII,S$GLB,, | Performed by: UROLOGY

## 2019-11-13 PROCEDURE — 3075F PR MOST RECENT SYSTOLIC BLOOD PRESS GE 130-139MM HG: ICD-10-PCS | Mod: CPTII,S$GLB,, | Performed by: UROLOGY

## 2019-11-13 PROCEDURE — 99214 OFFICE O/P EST MOD 30 MIN: CPT | Mod: S$GLB,,, | Performed by: UROLOGY

## 2019-11-13 PROCEDURE — 3008F PR BODY MASS INDEX (BMI) DOCUMENTED: ICD-10-PCS | Mod: CPTII,S$GLB,, | Performed by: UROLOGY

## 2019-11-13 PROCEDURE — 99999 PR PBB SHADOW E&M-EST. PATIENT-LVL III: ICD-10-PCS | Mod: PBBFAC,,, | Performed by: UROLOGY

## 2019-11-13 PROCEDURE — 99999 PR PBB SHADOW E&M-EST. PATIENT-LVL III: CPT | Mod: PBBFAC,,, | Performed by: UROLOGY

## 2019-11-13 PROCEDURE — 3075F SYST BP GE 130 - 139MM HG: CPT | Mod: CPTII,S$GLB,, | Performed by: UROLOGY

## 2019-11-13 PROCEDURE — 3079F DIAST BP 80-89 MM HG: CPT | Mod: CPTII,S$GLB,, | Performed by: UROLOGY

## 2019-11-13 PROCEDURE — 3079F PR MOST RECENT DIASTOLIC BLOOD PRESSURE 80-89 MM HG: ICD-10-PCS | Mod: CPTII,S$GLB,, | Performed by: UROLOGY

## 2019-11-13 NOTE — PROGRESS NOTES
Subjective:       Patient ID: Cortes Schroeder is a 59 y.o. male who was last seen in this office Visit date not found    Chief Complaint:   Chief Complaint   Patient presents with    Benign Prostatic Hypertrophy     6 month f/u with ultrasound an psa       History of Kidney Stones  He has never required a procedure.  He passed a stone in 2003. He feels fine, no hematuria or dysuria or flank pain.     Erectile Dysfunction  Patient complains of erectile dysfunction. Onset of dysfunction was several years ago and was gradual in onset.  Patient states the nature of difficulty is both attaining and maintaining erection. Full erections occur never. Partial erections occur never. Libido is not affected. Risk factors for ED include diabetes mellitus. Patient denies history of pelvic radiation. Previous treatment of ED includes Viagra and Cialis and Trimix.  His  passed away in May 2017, he is not interested in medications at present.  He is dating again but is having some libido issues.    Hypogonadism  He is here today to discuss hypogonadism.  His symptoms include: Poor energy, Fatigue, Poor libido, Erectile Dysfunction, Increased Body Fat and Gynecomastia.   Onset of symptoms was several years ago and was gradual in onset. He is bothered by these symptoms.  Risk factors include: Diabetes and Hypertension.  Previous treatments include Androgel.        Benign Prostatic Hyperplasia  He patient reports frequency and nocturia two times a night. He denies straining, urgency and weak stream. The patient states symptoms are of mild severity. Onset of symptoms was several years ago and was gradual in onset.  He has no personal history and no family history of prostate cancer. He reports a history of kidney stones. He denies flank pain, gross hematuria and recurrent UTI.  He is currently taking no prostate medications.    ACTIVE MEDICAL ISSUES:  Patient Active Problem List   Diagnosis    Hyperlipidemia    Anxiety     Chronic bilateral low back pain without sciatica    Neuropathy    Uncontrolled type 2 diabetes mellitus with diabetic polyneuropathy, with long-term current use of insulin    NPDR (nonproliferative diabetic retinopathy)    Insomnia    Gastroesophageal reflux disease without esophagitis    Hypertension, well controlled    Chronic pain syndrome    Diabetic nephropathy associated with type 2 diabetes mellitus    Right sided weakness    Cervical syndrome    Chronic neck pain    Muscle weakness    Impaired motor control    Decreased range of motion (ROM) of shoulder    Cervical spondylosis without myelopathy    Neuroforaminal stenosis of cervical spine    Right cervical radiculopathy    Cervical radiculopathy    DDD (degenerative disc disease), lumbar       ALLERGIES AND MEDICATIONS: updated and reviewed.  Review of patient's allergies indicates:   Allergen Reactions    Invokana [canagliflozin] Anaphylaxis    Percocet [oxycodone-acetaminophen] Nausea Only and Hallucinations    Biaxin [clarithromycin]     Hydrocodone Other (See Comments)     Dizzy/nausea/hallucinations    Sulfa (sulfonamide antibiotics) Nausea Only and Rash     Current Outpatient Medications   Medication Sig    cyanocobalamin (VITAMIN B-12) 100 MCG tablet Take 100 mcg by mouth once daily.    diabetic supplies, miscellan. Kit Please change sensor every 14 days. FreeStyle [employer] Sensor 30-day supply (2 sensors/month)    DULoxetine (CYMBALTA) 60 MG capsule TAKE 1 CAPSULE(60 MG) BY MOUTH EVERY DAY    emtricitabine-tenofovir 200-300 mg (TRUVADA) 200-300 mg Tab Take 1 tablet by mouth once daily.    fish oil-omega-3 fatty acids 300-1,000 mg capsule Take by mouth 2 (two) times daily.    fluticasone propionate (FLONASE) 50 mcg/actuation nasal spray 1 spray (50 mcg total) by Each Nare route 2 (two) times daily.    gabapentin (NEURONTIN) 100 MG capsule TAKE 1 TO 2 CAPSULES(100  MG) BY MOUTH EVERY EVENING    HUMALOG U-100 INSULIN  100 unit/mL injection Use in insulin pump; max dose 150 units per day.    insulin pump controller (OMNIPOD DASH PDM KIT MISC) by Misc.(Non-Drug; Combo Route) route.    losartan (COZAAR) 50 MG tablet TAKE 1 TABLET(50 MG) BY MOUTH EVERY DAY    magnesium oxide-Mg AA chelate (MG-PLUS-PROTEIN) 133 mg Tab Take 500 mg by mouth every evening.     metFORMIN (GLUCOPHAGE) 1000 MG tablet TAKE 1 TABLET(1000 MG) BY MOUTH TWICE DAILY WITH MEALS    nortriptyline (PAMELOR) 50 MG capsule Take 1 capsule (50 mg total) by mouth nightly.    pravastatin (PRAVACHOL) 80 MG tablet TAKE 1 TABLET(80 MG) BY MOUTH EVERY DAY    promethazine-dextromethorphan (PROMETHAZINE-DM) 6.25-15 mg/5 mL Syrp Take 5ml qhs prn cough    PROPYLENE GLYCOL//PF (SYSTANE, PF, OPHT) Apply to eye 4 (four) times daily.    vitamin D 1000 units Tab Take 185 mg by mouth 2 (two) times daily.     aspirin (ECOTRIN) 81 MG EC tablet Take 1 tablet (81 mg total) by mouth once daily.     No current facility-administered medications for this visit.        Review of Systems   Constitutional: Negative for activity change, fatigue, fever and unexpected weight change.   HENT: Negative for congestion.    Eyes: Negative for redness.   Respiratory: Negative for chest tightness and shortness of breath.    Cardiovascular: Negative for chest pain and leg swelling.   Gastrointestinal: Negative for abdominal pain, constipation, diarrhea, nausea and vomiting.   Genitourinary: Negative for dysuria, flank pain, frequency, hematuria, penile pain, penile swelling, scrotal swelling, testicular pain and urgency.   Musculoskeletal: Negative for arthralgias and back pain.   Neurological: Negative for dizziness and light-headedness.   Psychiatric/Behavioral: Negative for behavioral problems and confusion. The patient is not nervous/anxious.    All other systems reviewed and are negative.      Objective:      Vitals:    11/13/19 1500   BP: 132/82   BP Location: Left arm   Patient  "Position: Sitting   BP Method: Large (Manual)   Weight: 94.6 kg (208 lb 8.9 oz)   Height: 5' 5" (1.651 m)     Physical Exam   Nursing note and vitals reviewed.  Constitutional: He is oriented to person, place, and time. He appears well-developed and well-nourished.   HENT:   Head: Normocephalic.   Eyes: Conjunctivae are normal.   Neck: Normal range of motion. Neck supple. No tracheal deviation present. No thyromegaly present.   Cardiovascular: Normal rate and normal heart sounds.    Pulmonary/Chest: Effort normal and breath sounds normal. No respiratory distress. He has no wheezes.   Abdominal: Soft. Bowel sounds are normal. There is no hepatosplenomegaly. There is no tenderness. There is no rebound and no CVA tenderness. No hernia.   Musculoskeletal: Normal range of motion. He exhibits no edema or tenderness.   Lymphadenopathy:     He has no cervical adenopathy.   Neurological: He is alert and oriented to person, place, and time.   Skin: Skin is warm and dry. No rash noted. No erythema.     Psychiatric: He has a normal mood and affect. His behavior is normal. Judgment and thought content normal.       Urine dipstick shows negative for all components.  Micro exam: negative for WBC's or RBC's.    US Retroperitoneal Complete (Kidney and   Order: [id number]   Status:  Final result   Visible to patient:  Yes (Patient Portal)   Next appt:  01/04/2020 at 08:30 AM in Lab (LAB, [facility])   Dx:  Kidney stones   Details     Reading Physician Reading Date Result Priority   Tony Fish MD 11/8/2019 Routine      Narrative     EXAMINATION:  US RETROPERITONEAL COMPLETE    CLINICAL HISTORY:  Calculus of kidney    TECHNIQUE:  Ultrasound of the kidneys and urinary bladder was performed including color flow and Doppler evaluation of the kidneys.    FINDINGS:  The right and left kidneys measure 11.6 and 10.7 cm respectively.  The right and left resistive indices measure 0.72 and 0.6 which is elevated on the right consistent with " medical renal disease.  There is no hydronephrosis.  The pre and post bladder volumes measure 52 and 12 mL respectively.  The prostate volume is 40 mL.  There is a 0.8 cm echogenic focus without shadowing within the mid right kidney which does not have the appearance of a stone.  There is a 5-6 mm echogenic focus within the mid left kidney suspicious for small stone.  The bladder is unremarkable.      Impression       Possible left-sided renal stone.    Elevated right resistive index consistent with medical renal disease.      Electronically signed by: Tony Fish MD  Date: 11/08/2019  Time: 12:36            Last Resulted: 11/08/19 12:36           PSA DIAGNOSTIC 0.00 - 4.00 ng/mL 0.32    Comment: PSA Expected levels:   Hormonal Therapy: <0.05 ng/ml   Prostatectomy: <0.01 ng/ml   Radiation Therapy: <1.00 ng/ml    Resulting Agency  [facility]      Narrative   Performed by: [facility]   6 months      Specimen Collected: 11/08/19 11:01   Last Resulted: 11/08/19 19:07            Assessment:       1. BPH without obstruction/lower urinary tract symptoms    2. ED (erectile dysfunction) of organic origin    3. Kidney stones    4. Hypogonadism male          Plan:       1. BPH without obstruction/lower urinary tract symptoms  BECKY in 6 months    2. ED (erectile dysfunction) of organic origin  Trimix when needed    3. Kidney stones  KUB in 6 months    4. Hypogonadism male    - Testosterone; Future            Follow up in about 4 weeks (around 12/11/2019) for Follow up, Review labs.

## 2019-11-18 ENCOUNTER — PATIENT MESSAGE (OUTPATIENT)
Dept: ENDOCRINOLOGY | Facility: CLINIC | Age: 59
End: 2019-11-18

## 2019-11-23 ENCOUNTER — LAB VISIT (OUTPATIENT)
Dept: LAB | Facility: HOSPITAL | Age: 59
End: 2019-11-23
Attending: UROLOGY
Payer: COMMERCIAL

## 2019-11-23 DIAGNOSIS — E29.1 HYPOGONADISM MALE: ICD-10-CM

## 2019-11-23 LAB — TESTOST SERPL-MCNC: 335 NG/DL (ref 304–1227)

## 2019-11-23 PROCEDURE — 84403 ASSAY OF TOTAL TESTOSTERONE: CPT

## 2019-11-23 PROCEDURE — 36415 COLL VENOUS BLD VENIPUNCTURE: CPT | Mod: PO

## 2019-12-02 DIAGNOSIS — F32.A DEPRESSION, UNSPECIFIED DEPRESSION TYPE: ICD-10-CM

## 2019-12-02 DIAGNOSIS — G89.4 CHRONIC PAIN SYNDROME: ICD-10-CM

## 2019-12-02 RX ORDER — DULOXETIN HYDROCHLORIDE 60 MG/1
CAPSULE, DELAYED RELEASE ORAL
Qty: 30 CAPSULE | Refills: 0 | Status: SHIPPED | OUTPATIENT
Start: 2019-12-02 | End: 2019-12-30 | Stop reason: SDUPTHER

## 2019-12-03 RX ORDER — NORTRIPTYLINE HYDROCHLORIDE 50 MG/1
50 CAPSULE ORAL NIGHTLY
Qty: 30 CAPSULE | Refills: 2 | Status: SHIPPED | OUTPATIENT
Start: 2019-12-03 | End: 2019-12-23 | Stop reason: SDUPTHER

## 2019-12-10 DIAGNOSIS — E78.2 MIXED HYPERLIPIDEMIA: ICD-10-CM

## 2019-12-10 RX ORDER — PRAVASTATIN SODIUM 80 MG/1
TABLET ORAL
Qty: 90 TABLET | Refills: 3 | Status: SHIPPED | OUTPATIENT
Start: 2019-12-10 | End: 2020-09-04

## 2019-12-11 ENCOUNTER — PATIENT MESSAGE (OUTPATIENT)
Dept: FAMILY MEDICINE | Facility: CLINIC | Age: 59
End: 2019-12-11

## 2019-12-12 DIAGNOSIS — E11.21 DIABETIC NEPHROPATHY ASSOCIATED WITH TYPE 2 DIABETES MELLITUS: ICD-10-CM

## 2019-12-12 DIAGNOSIS — M47.812 CERVICAL SPONDYLOSIS WITHOUT MYELOPATHY: ICD-10-CM

## 2019-12-12 DIAGNOSIS — M54.50 CHRONIC BILATERAL LOW BACK PAIN WITHOUT SCIATICA: ICD-10-CM

## 2019-12-12 DIAGNOSIS — Z20.6 HIV EXPOSURE: ICD-10-CM

## 2019-12-12 DIAGNOSIS — Z00.00 VISIT FOR WELL MAN HEALTH CHECK: Primary | ICD-10-CM

## 2019-12-12 DIAGNOSIS — M51.36 DDD (DEGENERATIVE DISC DISEASE), LUMBAR: ICD-10-CM

## 2019-12-12 DIAGNOSIS — G89.29 CHRONIC BILATERAL LOW BACK PAIN WITHOUT SCIATICA: ICD-10-CM

## 2019-12-12 DIAGNOSIS — M48.02 NEUROFORAMINAL STENOSIS OF CERVICAL SPINE: ICD-10-CM

## 2019-12-12 DIAGNOSIS — M54.12 CERVICAL RADICULOPATHY: ICD-10-CM

## 2019-12-12 DIAGNOSIS — I10 HYPERTENSION, WELL CONTROLLED: ICD-10-CM

## 2019-12-14 ENCOUNTER — PATIENT OUTREACH (OUTPATIENT)
Dept: ADMINISTRATIVE | Facility: OTHER | Age: 59
End: 2019-12-14

## 2019-12-17 ENCOUNTER — OFFICE VISIT (OUTPATIENT)
Dept: UROLOGY | Facility: CLINIC | Age: 59
End: 2019-12-17
Payer: COMMERCIAL

## 2019-12-17 VITALS
DIASTOLIC BLOOD PRESSURE: 76 MMHG | RESPIRATION RATE: 15 BRPM | BODY MASS INDEX: 35.16 KG/M2 | HEIGHT: 65 IN | WEIGHT: 211 LBS | HEART RATE: 60 BPM | SYSTOLIC BLOOD PRESSURE: 138 MMHG

## 2019-12-17 DIAGNOSIS — E29.1 HYPOGONADISM MALE: ICD-10-CM

## 2019-12-17 DIAGNOSIS — N20.0 KIDNEY STONES: ICD-10-CM

## 2019-12-17 DIAGNOSIS — N40.0 BPH WITHOUT OBSTRUCTION/LOWER URINARY TRACT SYMPTOMS: Primary | ICD-10-CM

## 2019-12-17 DIAGNOSIS — N52.9 ED (ERECTILE DYSFUNCTION) OF ORGANIC ORIGIN: ICD-10-CM

## 2019-12-17 PROCEDURE — 3075F PR MOST RECENT SYSTOLIC BLOOD PRESS GE 130-139MM HG: ICD-10-PCS | Mod: CPTII,S$GLB,, | Performed by: UROLOGY

## 2019-12-17 PROCEDURE — 99999 PR PBB SHADOW E&M-EST. PATIENT-LVL III: ICD-10-PCS | Mod: PBBFAC,,, | Performed by: UROLOGY

## 2019-12-17 PROCEDURE — 3078F PR MOST RECENT DIASTOLIC BLOOD PRESSURE < 80 MM HG: ICD-10-PCS | Mod: CPTII,S$GLB,, | Performed by: UROLOGY

## 2019-12-17 PROCEDURE — 3075F SYST BP GE 130 - 139MM HG: CPT | Mod: CPTII,S$GLB,, | Performed by: UROLOGY

## 2019-12-17 PROCEDURE — 99214 OFFICE O/P EST MOD 30 MIN: CPT | Mod: S$GLB,,, | Performed by: UROLOGY

## 2019-12-17 PROCEDURE — 3008F BODY MASS INDEX DOCD: CPT | Mod: CPTII,S$GLB,, | Performed by: UROLOGY

## 2019-12-17 PROCEDURE — 99214 PR OFFICE/OUTPT VISIT, EST, LEVL IV, 30-39 MIN: ICD-10-PCS | Mod: S$GLB,,, | Performed by: UROLOGY

## 2019-12-17 PROCEDURE — 3078F DIAST BP <80 MM HG: CPT | Mod: CPTII,S$GLB,, | Performed by: UROLOGY

## 2019-12-17 PROCEDURE — 99999 PR PBB SHADOW E&M-EST. PATIENT-LVL III: CPT | Mod: PBBFAC,,, | Performed by: UROLOGY

## 2019-12-17 PROCEDURE — 3008F PR BODY MASS INDEX (BMI) DOCUMENTED: ICD-10-PCS | Mod: CPTII,S$GLB,, | Performed by: UROLOGY

## 2019-12-17 NOTE — PROGRESS NOTES
Subjective:       Patient ID: Cortes Schroeder is a 59 y.o. male who was last seen in this office 11/13/2019    Chief Complaint:   Chief Complaint   Patient presents with    Follow-up     Patient here follow with labs         History of Kidney Stones  He has never required a procedure.  He passed a stone in 2003. He feels fine, no hematuria or dysuria or flank pain.     Erectile Dysfunction  Patient complains of erectile dysfunction. Onset of dysfunction was several years ago and was gradual in onset.  Patient states the nature of difficulty is both attaining and maintaining erection. Full erections occur never. Partial erections occur never. Libido is not affected. Risk factors for ED include diabetes mellitus. Patient denies history of pelvic radiation. Previous treatment of ED includes Viagra and Cialis and Trimix.  His  passed away in May 2017, he is not interested in medications at present.  He is dating again but is having some libido issues.    Hypogonadism  He is here today to discuss hypogonadism.  His symptoms include: Poor energy, Fatigue, Poor libido, Erectile Dysfunction, Increased Body Fat and Gynecomastia.   Onset of symptoms was several years ago and was gradual in onset. He is bothered by these symptoms.  Risk factors include: Diabetes and Hypertension.  Previous treatments include Androgel.    Benign Prostatic Hyperplasia  He patient reports frequency and nocturia two times a night. He denies straining, urgency and weak stream. The patient states symptoms are of mild severity. Onset of symptoms was several years ago and was gradual in onset.  He has no personal history and no family history of prostate cancer. He reports a history of kidney stones. He denies flank pain, gross hematuria and recurrent UTI.  He is currently taking no prostate medications.    ACTIVE MEDICAL ISSUES:  Patient Active Problem List   Diagnosis    Hyperlipidemia    Anxiety    Chronic bilateral low back pain  without sciatica    Neuropathy    Uncontrolled type 2 diabetes mellitus with diabetic polyneuropathy, with long-term current use of insulin    NPDR (nonproliferative diabetic retinopathy)    Insomnia    Gastroesophageal reflux disease without esophagitis    Hypertension, well controlled    Chronic pain syndrome    Diabetic nephropathy associated with type 2 diabetes mellitus    Right sided weakness    Cervical syndrome    Chronic neck pain    Muscle weakness    Impaired motor control    Decreased range of motion (ROM) of shoulder    Cervical spondylosis without myelopathy    Neuroforaminal stenosis of cervical spine    Right cervical radiculopathy    Cervical radiculopathy    DDD (degenerative disc disease), lumbar       ALLERGIES AND MEDICATIONS: updated and reviewed.  Review of patient's allergies indicates:   Allergen Reactions    Invokana [canagliflozin] Anaphylaxis    Percocet [oxycodone-acetaminophen] Nausea Only and Hallucinations    Biaxin [clarithromycin]     Hydrocodone Other (See Comments)     Dizzy/nausea/hallucinations    Sulfa (sulfonamide antibiotics) Nausea Only and Rash     Current Outpatient Medications   Medication Sig    cyanocobalamin (VITAMIN B-12) 100 MCG tablet Take 100 mcg by mouth once daily.    diabetic supplies, miscellan. Kit Please change sensor every 14 days. FreeStyle Armani Sensor 30-day supply (2 sensors/month)    DULoxetine (CYMBALTA) 60 MG capsule TAKE 1 CAPSULE(60 MG) BY MOUTH EVERY DAY    emtricitabine-tenofovir 200-300 mg (TRUVADA) 200-300 mg Tab Take 1 tablet by mouth once daily.    fish oil-omega-3 fatty acids 300-1,000 mg capsule Take by mouth 2 (two) times daily.    fluticasone propionate (FLONASE) 50 mcg/actuation nasal spray 1 spray (50 mcg total) by Each Nare route 2 (two) times daily.    gabapentin (NEURONTIN) 100 MG capsule TAKE 1 TO 2 CAPSULES(100  MG) BY MOUTH EVERY EVENING    HUMALOG U-100 INSULIN 100 unit/mL injection Use in  "insulin pump; max dose 150 units per day.    insulin pump controller (OMNIPOD DASH PDM KIT MISC) by Misc.(Non-Drug; Combo Route) route.    losartan (COZAAR) 50 MG tablet TAKE 1 TABLET(50 MG) BY MOUTH EVERY DAY    magnesium oxide-Mg AA chelate (MG-PLUS-PROTEIN) 133 mg Tab Take 500 mg by mouth every evening.     metFORMIN (GLUCOPHAGE) 1000 MG tablet TAKE 1 TABLET(1000 MG) BY MOUTH TWICE DAILY WITH MEALS    nortriptyline (PAMELOR) 50 MG capsule Take 1 capsule (50 mg total) by mouth nightly.    pravastatin (PRAVACHOL) 80 MG tablet TAKE 1 TABLET(80 MG) BY MOUTH EVERY DAY    promethazine-dextromethorphan (PROMETHAZINE-DM) 6.25-15 mg/5 mL Syrp Take 5ml qhs prn cough    PROPYLENE GLYCOL//PF (SYSTANE, PF, OPHT) Apply to eye 4 (four) times daily.    vitamin D 1000 units Tab Take 185 mg by mouth 2 (two) times daily.     aspirin (ECOTRIN) 81 MG EC tablet Take 1 tablet (81 mg total) by mouth once daily.     No current facility-administered medications for this visit.        Review of Systems   Constitutional: Negative for activity change, fatigue, fever and unexpected weight change.   HENT: Negative for congestion.    Eyes: Negative for redness.   Respiratory: Negative for chest tightness and shortness of breath.    Cardiovascular: Negative for chest pain and leg swelling.   Gastrointestinal: Negative for abdominal pain, constipation, diarrhea, nausea and vomiting.   Genitourinary: Negative for dysuria, flank pain, frequency, hematuria, penile pain, penile swelling, scrotal swelling, testicular pain and urgency.   Musculoskeletal: Negative for arthralgias and back pain.   Neurological: Negative for dizziness and light-headedness.   Psychiatric/Behavioral: Negative for behavioral problems and confusion. The patient is not nervous/anxious.    All other systems reviewed and are negative.      Objective:      Vitals:    12/17/19 1558   BP: 138/76   Pulse: 60   Resp: 15   Weight: 95.7 kg (211 lb)   Height: 5' 5" " (1.651 m)     Physical Exam   Nursing note and vitals reviewed.  Constitutional: He is oriented to person, place, and time. He appears well-developed and well-nourished.   HENT:   Head: Normocephalic.   Eyes: Conjunctivae are normal.   Neck: Normal range of motion. Neck supple. No tracheal deviation present. No thyromegaly present.   Cardiovascular: Normal rate and normal heart sounds.    Pulmonary/Chest: Effort normal and breath sounds normal. No respiratory distress. He has no wheezes.   Abdominal: Soft. Bowel sounds are normal. There is no hepatosplenomegaly. There is no tenderness. There is no rebound and no CVA tenderness. No hernia.   Musculoskeletal: Normal range of motion. He exhibits no edema or tenderness.   Lymphadenopathy:     He has no cervical adenopathy.   Neurological: He is alert and oriented to person, place, and time.   Skin: Skin is warm and dry. No rash noted. No erythema.     Psychiatric: He has a normal mood and affect. His behavior is normal. Judgment and thought content normal.       Urine dipstick shows negative for all components.  Micro exam: negative for WBC's or RBC's.    Testosterone, Total 304 - 1227 ng/dL 335    Resulting Agency  [facility]         Specimen Collected: 11/23/19 09:26   Last Resulted: 11/23/19 15:31            Assessment:       1. BPH without obstruction/lower urinary tract symptoms    2. ED (erectile dysfunction) of organic origin    3. Kidney stones    4. Hypogonadism male          Plan:       1. BPH without obstruction/lower urinary tract symptoms  BECKY in 6 months    2. ED (erectile dysfunction) of organic origin  We dicussed options.  He is still not wanting injections again.  He will think about DARELL or surgery.    3. Kidney stones    - X-Ray Abdomen AP 1 View; Future    4. Hypogonadism male  His level is too high to warrant further elevation.            Follow up in about 6 months (around 6/17/2020) for Follow up.

## 2019-12-23 RX ORDER — NORTRIPTYLINE HYDROCHLORIDE 50 MG/1
50 CAPSULE ORAL NIGHTLY
Qty: 30 CAPSULE | Refills: 2 | Status: SHIPPED | OUTPATIENT
Start: 2019-12-23 | End: 2020-04-08

## 2019-12-28 ENCOUNTER — LAB VISIT (OUTPATIENT)
Dept: LAB | Facility: HOSPITAL | Age: 59
End: 2019-12-28
Attending: FAMILY MEDICINE
Payer: COMMERCIAL

## 2019-12-28 LAB
ESTIMATED AVG GLUCOSE: 240 MG/DL (ref 68–131)
HBA1C MFR BLD HPLC: 10 % (ref 4–5.6)

## 2019-12-28 PROCEDURE — 83036 HEMOGLOBIN GLYCOSYLATED A1C: CPT

## 2019-12-28 PROCEDURE — 36415 COLL VENOUS BLD VENIPUNCTURE: CPT | Mod: PO

## 2019-12-30 ENCOUNTER — PATIENT MESSAGE (OUTPATIENT)
Dept: FAMILY MEDICINE | Facility: CLINIC | Age: 59
End: 2019-12-30

## 2019-12-30 DIAGNOSIS — G89.4 CHRONIC PAIN SYNDROME: ICD-10-CM

## 2019-12-30 DIAGNOSIS — F32.A DEPRESSION, UNSPECIFIED DEPRESSION TYPE: ICD-10-CM

## 2019-12-30 RX ORDER — DULOXETIN HYDROCHLORIDE 60 MG/1
CAPSULE, DELAYED RELEASE ORAL
Qty: 30 CAPSULE | Refills: 0 | Status: SHIPPED | OUTPATIENT
Start: 2019-12-30 | End: 2020-01-22

## 2020-01-03 ENCOUNTER — OFFICE VISIT (OUTPATIENT)
Dept: FAMILY MEDICINE | Facility: CLINIC | Age: 60
End: 2020-01-03
Payer: COMMERCIAL

## 2020-01-03 ENCOUNTER — PATIENT OUTREACH (OUTPATIENT)
Dept: ADMINISTRATIVE | Facility: OTHER | Age: 60
End: 2020-01-03

## 2020-01-03 VITALS
OXYGEN SATURATION: 97 % | TEMPERATURE: 98 F | WEIGHT: 212.31 LBS | SYSTOLIC BLOOD PRESSURE: 138 MMHG | HEART RATE: 89 BPM | BODY MASS INDEX: 35.37 KG/M2 | HEIGHT: 65 IN | RESPIRATION RATE: 17 BRPM | DIASTOLIC BLOOD PRESSURE: 84 MMHG

## 2020-01-03 DIAGNOSIS — Z20.6 EXPOSURE TO HIV: Primary | ICD-10-CM

## 2020-01-03 DIAGNOSIS — M25.511 CHRONIC RIGHT SHOULDER PAIN: ICD-10-CM

## 2020-01-03 DIAGNOSIS — E11.42 DIABETIC PERIPHERAL NEUROPATHY ASSOCIATED WITH TYPE 2 DIABETES MELLITUS: ICD-10-CM

## 2020-01-03 DIAGNOSIS — G89.29 CHRONIC RIGHT SHOULDER PAIN: ICD-10-CM

## 2020-01-03 PROCEDURE — 99214 PR OFFICE/OUTPT VISIT, EST, LEVL IV, 30-39 MIN: ICD-10-PCS | Mod: S$GLB,,, | Performed by: FAMILY MEDICINE

## 2020-01-03 PROCEDURE — 3046F HEMOGLOBIN A1C LEVEL >9.0%: CPT | Mod: CPTII,S$GLB,, | Performed by: FAMILY MEDICINE

## 2020-01-03 PROCEDURE — 3079F PR MOST RECENT DIASTOLIC BLOOD PRESSURE 80-89 MM HG: ICD-10-PCS | Mod: CPTII,S$GLB,, | Performed by: FAMILY MEDICINE

## 2020-01-03 PROCEDURE — 99999 PR PBB SHADOW E&M-EST. PATIENT-LVL IV: ICD-10-PCS | Mod: PBBFAC,,, | Performed by: FAMILY MEDICINE

## 2020-01-03 PROCEDURE — 3046F PR MOST RECENT HEMOGLOBIN A1C LEVEL > 9.0%: ICD-10-PCS | Mod: CPTII,S$GLB,, | Performed by: FAMILY MEDICINE

## 2020-01-03 PROCEDURE — 3079F DIAST BP 80-89 MM HG: CPT | Mod: CPTII,S$GLB,, | Performed by: FAMILY MEDICINE

## 2020-01-03 PROCEDURE — 3008F PR BODY MASS INDEX (BMI) DOCUMENTED: ICD-10-PCS | Mod: CPTII,S$GLB,, | Performed by: FAMILY MEDICINE

## 2020-01-03 PROCEDURE — 3075F PR MOST RECENT SYSTOLIC BLOOD PRESS GE 130-139MM HG: ICD-10-PCS | Mod: CPTII,S$GLB,, | Performed by: FAMILY MEDICINE

## 2020-01-03 PROCEDURE — 99999 PR PBB SHADOW E&M-EST. PATIENT-LVL IV: CPT | Mod: PBBFAC,,, | Performed by: FAMILY MEDICINE

## 2020-01-03 PROCEDURE — 99214 OFFICE O/P EST MOD 30 MIN: CPT | Mod: S$GLB,,, | Performed by: FAMILY MEDICINE

## 2020-01-03 PROCEDURE — 3008F BODY MASS INDEX DOCD: CPT | Mod: CPTII,S$GLB,, | Performed by: FAMILY MEDICINE

## 2020-01-03 PROCEDURE — 3075F SYST BP GE 130 - 139MM HG: CPT | Mod: CPTII,S$GLB,, | Performed by: FAMILY MEDICINE

## 2020-01-03 RX ORDER — GABAPENTIN 100 MG/1
CAPSULE ORAL
Qty: 120 CAPSULE | Refills: 1 | Status: SHIPPED | OUTPATIENT
Start: 2020-01-03 | End: 2020-02-24

## 2020-01-03 RX ORDER — FLASH GLUCOSE SENSOR
KIT MISCELLANEOUS
COMMUNITY
Start: 2019-12-18 | End: 2020-02-10 | Stop reason: SDUPTHER

## 2020-01-03 NOTE — PROGRESS NOTES
Routine Office Visit    Patient Name: Cortes Schroeder    : 1960  MRN: [id number]    Subjective:  Cortes is a 59 y.o. male who presents today for:    1. PrEP  Patient presenting today for follow up of PrEP.  He has been taking medication as prescribed.  He has not had any side effects related to the medication.  He is not currently sexually active.  When he is, its the same partner.  They occasionally use condoms.  He states that blood sugars have been doing better.   No rashes, penile discharge, night sweats, or unexplained weight loss.  Last HIV test done on  as well as CMP.  HIV was negative and no CKD    2.  Right shoulder pain  Patient states that for the past 2 years he has been having right shoulder pain when he flexes at the shoulder.  The pain is localized to the shoulder region and does not radiate down the arm.  There is no associated numbness in the arm/hand.  He does have occasional numbness in both hands when typing.  He has not had any weakness in the right arm and no trauma to the shoulder that he recalls.  He states that is is a sharp pain and that it also occurs if he lays on his right side.    Past Medical History  Past Medical History:   Diagnosis Date    Anxiety 2012    Back pain 2012    Cataract     Chronic pain syndrome 2016    Diabetes type 2, controlled 2016    Diabetic retinopathy     DM (diabetes mellitus) 2012    DM (diabetes mellitus), type 2, uncontrolled 2013    Essential hypertension 2016    Gastroesophageal reflux disease without esophagitis 2016    Hyperlipidemia 2012    Insomnia 2014    Neuropathy 2013       Past Surgical History  Past Surgical History:   Procedure Laterality Date    BACK SURGERY      Lumbar Spine    EPIDURAL STEROID INJECTION N/A 2019    Procedure: Injection, Steroid, Epidural Cervical;  Surgeon: Andrew Sinclair Jr., MD;  [facility];  Service: Pain  Management;  Laterality: N/A;  Cervical Epidural Steroid Injection C7-T1    88511    Arrive @ 1240    EPIDURAL STEROID INJECTION N/A 2/20/2019    Procedure: Injection, Steroid, Epidural;  Surgeon: Andrew Sinclair Jr., MD;  Location: [facility];  Service: Pain Management;  Laterality: N/A;  Lumbar Epidural Steroid Injection L4-5    57672    Arrive @ 1215 (requests latest time)       Family History  Family History   Problem Relation Age of Onset    Cataracts Father     Hypertension Father     Parkinsonism Father     Alzheimer's disease Father     Migraines Mother     Hypertension Mother     Heart attack Mother     Amblyopia Neg Hx     Blindness Neg Hx     Glaucoma Neg Hx     Macular degeneration Neg Hx     Retinal detachment Neg Hx     Strabismus Neg Hx        Social History  Social History     Socioeconomic History    Marital status:      Spouse name: Not on file    Number of children: Not on file    Years of education: Not on file    Highest education level: Not on file   Occupational History    Not on file   Social Needs    Financial resource strain: Not very hard    Food insecurity:     Worry: Never true     Inability: Never true    Transportation needs:     Medical: No     Non-medical: No   Tobacco Use    Smoking status: Never Smoker    Smokeless tobacco: Never Used   Substance and Sexual Activity    Alcohol use: Yes     Alcohol/week: 1.0 standard drinks     Types: 1 Glasses of wine per week     Frequency: 2-4 times a month     Drinks per session: 1 or 2     Binge frequency: Less than monthly     Comment: occasionally    Drug use: No    Sexual activity: Not Currently     Partners: Male     Birth control/protection: Condom     Comment: 10/2/17    Lifestyle    Physical activity:     Days per week: 2 days     Minutes per session: 30 min    Stress: Only a little   Relationships    Social connections:     Talks on phone: Twice a week     Gets together: Once a week      Attends Voodoo service: Not on file     Active member of club or organization: No     Attends meetings of clubs or organizations: More than 4 times per year     Relationship status:    Other Topics Concern    Not on file   Social History Narrative    Not on file       Current Medications  Current Outpatient Medications on File Prior to Visit   Medication Sig Dispense Refill    aspirin (ECOTRIN) 81 MG EC tablet Take 1 tablet (81 mg total) by mouth once daily.  0    cyanocobalamin (VITAMIN B-12) 100 MCG tablet Take 100 mcg by mouth once daily.      diabetic supplies, miscellan. Kit Please change sensor every 14 days. FreeStyle Armani Sensor 30-day supply (2 sensors/month) 2 kit 5    DULoxetine (CYMBALTA) 60 MG capsule TAKE 1 CAPSULE(60 MG) BY MOUTH EVERY DAY 30 capsule 0    emtricitabine-tenofovir 200-300 mg (TRUVADA) 200-300 mg Tab Take 1 tablet by mouth once daily. 30 tablet 2    fish oil-omega-3 fatty acids 300-1,000 mg capsule Take by mouth 2 (two) times daily.      FREESTYLE ARMANI 14 DAY SENSOR Kit U UTD      HUMALOG U-100 INSULIN 100 unit/mL injection Use in insulin pump; max dose 150 units per day. 50 mL 5    insulin pump controller (OMNIPOD DASH PDM KIT MISC) by Misc.(Non-Drug; Combo Route) route.      losartan (COZAAR) 50 MG tablet TAKE 1 TABLET(50 MG) BY MOUTH EVERY DAY 90 tablet 3    magnesium oxide-Mg AA chelate (MG-PLUS-PROTEIN) 133 mg Tab Take 500 mg by mouth every evening.       metFORMIN (GLUCOPHAGE) 1000 MG tablet TAKE 1 TABLET(1000 MG) BY MOUTH TWICE DAILY WITH MEALS 180 tablet 2    nortriptyline (PAMELOR) 50 MG capsule Take 1 capsule (50 mg total) by mouth nightly. 30 capsule 2    pravastatin (PRAVACHOL) 80 MG tablet TAKE 1 TABLET(80 MG) BY MOUTH EVERY DAY 90 tablet 3    promethazine-dextromethorphan (PROMETHAZINE-DM) 6.25-15 mg/5 mL Syrp Take 5ml qhs prn cough 240 mL 3    PROPYLENE GLYCOL//PF (SYSTANE, PF, OPHT) Apply to eye 4 (four) times daily.      vitamin D  "1000 units Tab Take 185 mg by mouth 2 (two) times daily.       [DISCONTINUED] fluticasone propionate (FLONASE) 50 mcg/actuation nasal spray 1 spray (50 mcg total) by Each Nare route 2 (two) times daily. 1 Bottle 6    [DISCONTINUED] gabapentin (NEURONTIN) 100 MG capsule TAKE 1 TO 2 CAPSULES(100  MG) BY MOUTH EVERY EVENING 60 capsule 3     No current facility-administered medications on file prior to visit.        Allergies   Review of patient's allergies indicates:   Allergen Reactions    Invokana [canagliflozin] Anaphylaxis    Percocet [oxycodone-acetaminophen] Nausea Only and Hallucinations    Biaxin [clarithromycin]     Hydrocodone Other (See Comments)     Dizzy/nausea/hallucinations    Sulfa (sulfonamide antibiotics) Nausea Only and Rash       Review of Systems (Pertinent positives)  Review of Systems   Constitutional: Negative.    HENT: Negative.    Eyes: Negative for discharge.   Respiratory: Negative for wheezing.    Cardiovascular: Negative for chest pain and palpitations.   Gastrointestinal: Negative for blood in stool, constipation, diarrhea and vomiting.   Genitourinary: Negative for hematuria and urgency.   Musculoskeletal: Positive for joint pain and myalgias. Negative for neck pain.   Skin: Negative.    Neurological: Negative for weakness and headaches.   Endo/Heme/Allergies: Negative for polydipsia.         /84 (BP Location: Left arm, Patient Position: Sitting, BP Method: Large (Manual))   Pulse 89   Temp 98 °F (36.7 °C) (Oral)   Resp 17   Ht 5' 5" (1.651 m)   Wt 96.3 kg (212 lb 4.9 oz)   SpO2 97%   BMI 35.33 kg/m²     GENERAL APPEARANCE: in no apparent distress and well developed and well nourished  HEENT: PERRL, EOMI, Sclera clear, anicteric, Oropharynx clear, no lesions, Neck supple with midline trachea  NECK: normal, supple, no adenopathy, thyroid normal in size  RESPIRATORY: appears well, vitals normal, no respiratory distress, acyanotic, normal RR, chest clear, no " wheezing, crepitations, rhonchi, normal symmetric air entry  HEART: regular rate and rhythm, S1, S2 normal, no murmur, click, rub or gallop.    ABDOMEN: abdomen is soft without tenderness, no masses, no hernias, no organomegaly, no rebound, no guarding. Suprapubic tenderness absent. No CVA tenderness.  SKIN: no rashes, no wounds, no other lesions  PSYCH: Alert, oriented x 3, thought content appropriate, speech normal, pleasant and cooperative, good eye contact, well groomed  MS:  No pain on palpation of right shoulder, but pain with flexion and abduction (acitve and passive)    Assessment/Plan:  Cortes Schroeder is a 59 y.o. male who presents today for :    Cortes was seen today for arm pain.    Diagnoses and all orders for this visit:    Exposure to HIV    Diabetic peripheral neuropathy associated with type 2 diabetes mellitus  -     gabapentin (NEURONTIN) 100 MG capsule; Take 3 capsules at bedtime and 1 in the AM    Chronic right shoulder pain  -     Ambulatory referral/consult to Orthopedics  -     X-ray Shoulder 2 or More Views Right; Future      1.  Truvada UTD and will be due for refill in 3 weeks  2.  Labs UTD  3.  Follow up 3 months or sooner if needed  4.  Increased gabapentin dosage due to pain and neuropathy  5.  Referred to ortho for eval of right shoulder pain    Don Artis MD

## 2020-01-07 ENCOUNTER — OFFICE VISIT (OUTPATIENT)
Dept: ORTHOPEDICS | Facility: CLINIC | Age: 60
End: 2020-01-07
Attending: FAMILY MEDICINE
Payer: COMMERCIAL

## 2020-01-07 ENCOUNTER — OFFICE VISIT (OUTPATIENT)
Dept: ENDOCRINOLOGY | Facility: CLINIC | Age: 60
End: 2020-01-07
Payer: COMMERCIAL

## 2020-01-07 VITALS
BODY MASS INDEX: 35.35 KG/M2 | HEART RATE: 102 BPM | SYSTOLIC BLOOD PRESSURE: 124 MMHG | DIASTOLIC BLOOD PRESSURE: 74 MMHG | WEIGHT: 212.38 LBS

## 2020-01-07 VITALS
WEIGHT: 214.5 LBS | SYSTOLIC BLOOD PRESSURE: 140 MMHG | BODY MASS INDEX: 35.74 KG/M2 | HEIGHT: 65 IN | DIASTOLIC BLOOD PRESSURE: 82 MMHG | HEART RATE: 93 BPM | RESPIRATION RATE: 18 BRPM | OXYGEN SATURATION: 96 %

## 2020-01-07 DIAGNOSIS — E11.3299 NPDR (NONPROLIFERATIVE DIABETIC RETINOPATHY): ICD-10-CM

## 2020-01-07 DIAGNOSIS — Z71.9 VISIT FOR COUNSELING: ICD-10-CM

## 2020-01-07 DIAGNOSIS — M75.81 RIGHT ROTATOR CUFF TENDINITIS: Primary | ICD-10-CM

## 2020-01-07 DIAGNOSIS — I10 ESSENTIAL HYPERTENSION: ICD-10-CM

## 2020-01-07 PROCEDURE — 3074F SYST BP LT 130 MM HG: CPT | Mod: CPTII,S$GLB,, | Performed by: NURSE PRACTITIONER

## 2020-01-07 PROCEDURE — 3074F PR MOST RECENT SYSTOLIC BLOOD PRESSURE < 130 MM HG: ICD-10-PCS | Mod: CPTII,S$GLB,, | Performed by: NURSE PRACTITIONER

## 2020-01-07 PROCEDURE — 3046F PR MOST RECENT HEMOGLOBIN A1C LEVEL > 9.0%: ICD-10-PCS | Mod: CPTII,S$GLB,, | Performed by: NURSE PRACTITIONER

## 2020-01-07 PROCEDURE — 99204 OFFICE O/P NEW MOD 45 MIN: CPT | Mod: 25,S$GLB,, | Performed by: ORTHOPAEDIC SURGERY

## 2020-01-07 PROCEDURE — 99999 PR PBB SHADOW E&M-EST. PATIENT-LVL V: ICD-10-PCS | Mod: PBBFAC,,, | Performed by: NURSE PRACTITIONER

## 2020-01-07 PROCEDURE — 3046F HEMOGLOBIN A1C LEVEL >9.0%: CPT | Mod: CPTII,S$GLB,, | Performed by: NURSE PRACTITIONER

## 2020-01-07 PROCEDURE — 3008F PR BODY MASS INDEX (BMI) DOCUMENTED: ICD-10-PCS | Mod: CPTII,S$GLB,, | Performed by: NURSE PRACTITIONER

## 2020-01-07 PROCEDURE — 3078F DIAST BP <80 MM HG: CPT | Mod: CPTII,S$GLB,, | Performed by: NURSE PRACTITIONER

## 2020-01-07 PROCEDURE — 3008F PR BODY MASS INDEX (BMI) DOCUMENTED: ICD-10-PCS | Mod: CPTII,S$GLB,, | Performed by: ORTHOPAEDIC SURGERY

## 2020-01-07 PROCEDURE — 20610 DRAIN/INJ JOINT/BURSA W/O US: CPT | Mod: RT,S$GLB,, | Performed by: ORTHOPAEDIC SURGERY

## 2020-01-07 PROCEDURE — 99215 OFFICE O/P EST HI 40 MIN: CPT | Mod: S$GLB,,, | Performed by: NURSE PRACTITIONER

## 2020-01-07 PROCEDURE — 3008F BODY MASS INDEX DOCD: CPT | Mod: CPTII,S$GLB,, | Performed by: NURSE PRACTITIONER

## 2020-01-07 PROCEDURE — 3078F PR MOST RECENT DIASTOLIC BLOOD PRESSURE < 80 MM HG: ICD-10-PCS | Mod: CPTII,S$GLB,, | Performed by: NURSE PRACTITIONER

## 2020-01-07 PROCEDURE — 3077F SYST BP >= 140 MM HG: CPT | Mod: CPTII,S$GLB,, | Performed by: ORTHOPAEDIC SURGERY

## 2020-01-07 PROCEDURE — 3079F DIAST BP 80-89 MM HG: CPT | Mod: CPTII,S$GLB,, | Performed by: ORTHOPAEDIC SURGERY

## 2020-01-07 PROCEDURE — 3008F BODY MASS INDEX DOCD: CPT | Mod: CPTII,S$GLB,, | Performed by: ORTHOPAEDIC SURGERY

## 2020-01-07 PROCEDURE — 99204 PR OFFICE/OUTPT VISIT, NEW, LEVL IV, 45-59 MIN: ICD-10-PCS | Mod: 25,S$GLB,, | Performed by: ORTHOPAEDIC SURGERY

## 2020-01-07 PROCEDURE — 3079F PR MOST RECENT DIASTOLIC BLOOD PRESSURE 80-89 MM HG: ICD-10-PCS | Mod: CPTII,S$GLB,, | Performed by: ORTHOPAEDIC SURGERY

## 2020-01-07 PROCEDURE — 3077F PR MOST RECENT SYSTOLIC BLOOD PRESSURE >= 140 MM HG: ICD-10-PCS | Mod: CPTII,S$GLB,, | Performed by: ORTHOPAEDIC SURGERY

## 2020-01-07 PROCEDURE — 99999 PR PBB SHADOW E&M-EST. PATIENT-LVL IV: ICD-10-PCS | Mod: PBBFAC,,, | Performed by: ORTHOPAEDIC SURGERY

## 2020-01-07 PROCEDURE — 99215 PR OFFICE/OUTPT VISIT, EST, LEVL V, 40-54 MIN: ICD-10-PCS | Mod: S$GLB,,, | Performed by: NURSE PRACTITIONER

## 2020-01-07 PROCEDURE — 20610 LARGE JOINT ASPIRATION/INJECTION: R SUBACROMIAL BURSA: ICD-10-PCS | Mod: RT,S$GLB,, | Performed by: ORTHOPAEDIC SURGERY

## 2020-01-07 PROCEDURE — 99999 PR PBB SHADOW E&M-EST. PATIENT-LVL V: CPT | Mod: PBBFAC,,, | Performed by: NURSE PRACTITIONER

## 2020-01-07 PROCEDURE — 99999 PR PBB SHADOW E&M-EST. PATIENT-LVL IV: CPT | Mod: PBBFAC,,, | Performed by: ORTHOPAEDIC SURGERY

## 2020-01-07 RX ORDER — TRIAMCINOLONE ACETONIDE 40 MG/ML
40 INJECTION, SUSPENSION INTRA-ARTICULAR; INTRAMUSCULAR
Status: DISCONTINUED | OUTPATIENT
Start: 2020-01-07 | End: 2020-01-07 | Stop reason: HOSPADM

## 2020-01-07 RX ADMIN — TRIAMCINOLONE ACETONIDE 40 MG: 40 INJECTION, SUSPENSION INTRA-ARTICULAR; INTRAMUSCULAR at 01:01

## 2020-01-07 NOTE — PROCEDURES
Large Joint Aspiration/Injection: R subacromial bursa  Date/Time: 1/7/2020 1:00 PM  Performed by: Kassandra Michele MD  Authorized by: Kassandra Michele MD     Consent Done?:  Yes (Written)  Indications:  Pain  Timeout: Prior to procedure the correct patient, procedure, and site was verified    Anesthesia  Local anesthesia used  Anesthetic: topical anesthetic    Location:  Shoulder  Site:  R subacromial bursa  Prep: Patient was prepped and draped in usual sterile fashion    Needle size:  22 G  Approach:  Posterior  Medications:  40 mg triamcinolone acetonide 40 mg/mL  Patient tolerance:  Patient tolerated the procedure well with no immediate complications

## 2020-01-07 NOTE — PATIENT INSTRUCTIONS
Recommend that you set alarms on your phone at 6 am, 11 am, and 6 pm.   The biggest barrier remains remembering to bolus insulin prior to meals. This is why your blood sugars rise to 200-400s.   No changes to insulin pump settings. Work on bolus adherence and testing glucose.   Test glucose 4x/day with jared or fingersticks.   Return to clinic in 3 months with labs prior.

## 2020-01-07 NOTE — LETTER
January 7, 2020      Don Artis MD  [address]  [place] [address]           [facility]  [address]  [facility] [address]  Phone: [phone]          Patient: Cortes Schroeder   MR Number: [id number]   YOB: 1960   Date of Visit: 1/7/2020       Dear Dr. Don HUGGINS Page:    Thank you for referring Cortes Schroeder to me for evaluation. Attached you will find relevant portions of my assessment and plan of care.    If you have questions, please do not hesitate to call me. I look forward to following Cortes Schroeder along with you.    Sincerely,    Kassandra Michele MD    Enclosure  CC:  No Recipients    If you would like to receive this communication electronically, please contact [email or web address] or [phone] to request more information on [employer] access.    For providers and/or their staff who would like to refer a patient to [facility], please contact us through our one-stop-shop provider referral line, [facility], at [phone].    If you feel you have received this communication in error or would no longer like to receive these types of communications, please e-mail [email or web address]

## 2020-01-07 NOTE — PROGRESS NOTES
"Chief Complaint   Patient presents with    Right Shoulder - Pain       This patient was seen in consultation at the request of Dr. Don Artis     HPI: Cortes Schroeder is a 59 y.o. male who presents today complaining of right shoulder pain  Duration of symptoms:  Several years  Trauma or new activity: no  Pain is constant  Aggravating factors: worse with raising overhead, reaching behind his back, lifting   Relieving factors: Gabapentin and Aleve, mild improvement with rest   Night pain is present and is disruptive to sleep  - states it is so bad at night he is "almost screaming." Worse when he rolls on the right side  Feels that he has "no strength in the arm"  Radicular symptoms: yes neck pain, numbness, paresthesias into the hand  Pain is localized to subdeltoid fossa with radiation into lateral upper arm   Has been seen by Dr Cabral and Dr Sinclair - relief of neck pain with YVONNE  Associated symptoms:  limited motion .    Prior treatment:  OTC NSAIDs  and gabapentin with improvement in pain.     Pain does interfere with sleep and activities of daily living .    Has not had issues with blood glucose elevation with lumbar and cervical epidural steroid injections.  He has recently gotten his blood sugar under better control     Had a serious reaction to inkovana in 2014 - states he "flatlined" and "was in a coma" and has had a lot of neurologic issues since then     This is the extent of the patient's complaints at this time.     Hand dominance: Right     Occupation: Desk Worker     Review of Systems   All other systems reviewed and are negative.        Review of patient's allergies indicates:   Allergen Reactions    Invokana [canagliflozin] Anaphylaxis    Percocet [oxycodone-acetaminophen] Nausea Only and Hallucinations    Biaxin [clarithromycin]     Hydrocodone Other (See Comments)     Dizzy/nausea/hallucinations    Sulfa (sulfonamide antibiotics) Nausea Only and Rash         Current Outpatient " Medications:     cyanocobalamin (VITAMIN B-12) 100 MCG tablet, Take 100 mcg by mouth once daily., Disp: , Rfl:     diabetic supplies, miscellan. Kit, Please change sensor every 14 days. FreeStyle Armani Sensor 30-day supply (2 sensors/month), Disp: 2 kit, Rfl: 5    DULoxetine (CYMBALTA) 60 MG capsule, TAKE 1 CAPSULE(60 MG) BY MOUTH EVERY DAY, Disp: 30 capsule, Rfl: 0    emtricitabine-tenofovir 200-300 mg (TRUVADA) 200-300 mg Tab, Take 1 tablet by mouth once daily., Disp: 30 tablet, Rfl: 2    fish oil-omega-3 fatty acids 300-1,000 mg capsule, Take by mouth 2 (two) times daily., Disp: , Rfl:     FREESTYLE ARMANI 14 DAY SENSOR Kit, U UTD, Disp: , Rfl:     gabapentin (NEURONTIN) 100 MG capsule, Take 3 capsules at bedtime and 1 in the AM, Disp: 120 capsule, Rfl: 1    HUMALOG U-100 INSULIN 100 unit/mL injection, Use in insulin pump; max dose 150 units per day., Disp: 50 mL, Rfl: 5    insulin pump controller (OMNIPOD DASH PDM KIT MISC), by Misc.(Non-Drug; Combo Route) route., Disp: , Rfl:     losartan (COZAAR) 50 MG tablet, TAKE 1 TABLET(50 MG) BY MOUTH EVERY DAY, Disp: 90 tablet, Rfl: 3    magnesium oxide-Mg AA chelate (MG-PLUS-PROTEIN) 133 mg Tab, Take 500 mg by mouth every evening. , Disp: , Rfl:     metFORMIN (GLUCOPHAGE) 1000 MG tablet, TAKE 1 TABLET(1000 MG) BY MOUTH TWICE DAILY WITH MEALS, Disp: 180 tablet, Rfl: 2    nortriptyline (PAMELOR) 50 MG capsule, Take 1 capsule (50 mg total) by mouth nightly., Disp: 30 capsule, Rfl: 2    pravastatin (PRAVACHOL) 80 MG tablet, TAKE 1 TABLET(80 MG) BY MOUTH EVERY DAY, Disp: 90 tablet, Rfl: 3    promethazine-dextromethorphan (PROMETHAZINE-DM) 6.25-15 mg/5 mL Syrp, Take 5ml qhs prn cough, Disp: 240 mL, Rfl: 3    PROPYLENE GLYCOL//PF (SYSTANE, PF, OPHT), Apply to eye 4 (four) times daily., Disp: , Rfl:     vitamin D 1000 units Tab, Take 185 mg by mouth 2 (two) times daily. , Disp: , Rfl:     aspirin (ECOTRIN) 81 MG EC tablet, Take 1 tablet (81 mg total) by  mouth once daily., Disp: , Rfl: 0    Past Medical History:   Diagnosis Date    Anxiety 12/18/2012    Back pain 12/18/2012    Cataract     Chronic pain syndrome 4/24/2016    Diabetes type 2, controlled 2/20/2016    Diabetic retinopathy     DM (diabetes mellitus) 12/18/2012    DM (diabetes mellitus), type 2, uncontrolled 11/16/2013    Essential hypertension 2/20/2016    Gastroesophageal reflux disease without esophagitis 2/20/2016    Hyperlipidemia 12/18/2012    Insomnia 8/7/2014    Neuropathy 11/16/2013       Patient Active Problem List   Diagnosis    Hyperlipidemia    Anxiety    Chronic bilateral low back pain without sciatica    Neuropathy    Uncontrolled type 2 diabetes mellitus with diabetic polyneuropathy, with long-term current use of insulin    NPDR (nonproliferative diabetic retinopathy)    Insomnia    Gastroesophageal reflux disease without esophagitis    Hypertension, well controlled    Chronic pain syndrome    Diabetic nephropathy associated with type 2 diabetes mellitus    Right sided weakness    Cervical syndrome    Chronic neck pain    Muscle weakness    Impaired motor control    Decreased range of motion (ROM) of shoulder    Cervical spondylosis without myelopathy    Neuroforaminal stenosis of cervical spine    Right cervical radiculopathy    Cervical radiculopathy    DDD (degenerative disc disease), lumbar       Past Surgical History:   Procedure Laterality Date    BACK SURGERY  2003    Lumbar Spine    EPIDURAL STEROID INJECTION N/A 1/16/2019    Procedure: Injection, Steroid, Epidural Cervical;  Surgeon: Andrew Sinclair Jr., MD;  Location: [facility] ENDO;  Service: Pain Management;  Laterality: N/A;  Cervical Epidural Steroid Injection C7-T1    10664    Arrive @ 1240    EPIDURAL STEROID INJECTION N/A 2/20/2019    Procedure: Injection, Steroid, Epidural;  Surgeon: Andrew Sinclair Jr., MD;  Location: [facility] ENDO;  Service: Pain Management;  Laterality: N/A;  Lumbar  "Epidural Steroid Injection L4-5    97945    Arrive @ 1215 (requests latest time)       Social History     Tobacco Use    Smoking status: Never Smoker    Smokeless tobacco: Never Used   Substance Use Topics    Alcohol use: Yes     Alcohol/week: 1.0 standard drinks     Types: 1 Glasses of wine per week     Frequency: 2-4 times a month     Drinks per session: 1 or 2     Binge frequency: Less than monthly     Comment: occasionally    Drug use: No       Family History   Problem Relation Age of Onset    Cataracts Father     Hypertension Father     Parkinsonism Father     Alzheimer's disease Father     Migraines Mother     Hypertension Mother     Heart attack Mother     Amblyopia Neg Hx     Blindness Neg Hx     Glaucoma Neg Hx     Macular degeneration Neg Hx     Retinal detachment Neg Hx     Strabismus Neg Hx        Physical Exam:   Vitals:    01/07/20 1251   BP: (!) 140/82   Pulse: 93   Resp: 18   SpO2: 96%   Weight: 97.3 kg (214 lb 8.1 oz)   Height: 5' 5" (1.651 m)   PainSc:   2   PainLoc: Shoulder       General: Weight: 97.3 kg (214 lb 8.1 oz) Body mass index is 35.7 kg/m².  Patient is alert, awake and oriented to time, place and person. Mood and affect are appropriate.  Patient does not appear to be in any distress, denies any constitutional symptoms and appears stated age.   HEENT: Pupils are equal and round, sclera are not injected. External examination of ears and nose reveals no abnormalities. Cranial nerves II-X are grossly intact  Neck: examination demonstrates painful limited active range of motion. Spurling's sign is negative  Skin: no rashes, abrasions or open wounds on the affected extremity   Resp: No respiratory distress or audible wheezing   CV: 2+  pulses, all extremities warm and well perfused   Right Shoulder    Shoulder Range of Motion    Right     Left   (Active/Passive)       Forward Elevation     160/160            160/160  External rotation (arm at side)  50/50             " 50/50   Internal rotation behind the back  L5             L5     Range of motion is painful     Scapular winging no  Scapular dyskinesia  no    Examination of the back shows no atrophy     Acromioclavicular joint is not tender  Crossbody test: negative    Neer's negative  Hawkin's positive    Tamia's positive - pain and weakness   Drop arm negative  Bear hug  negative      Cuff Strength     Right     Left   Supraspinatus        4/5    5/5  Infraspinatus     5/5    5/5  Subscapularis     5/5    5/5    Deltoid testing            5/5    5/5    Speeds positive  Yergasons negative    Elbow examination demonstrates no tenderness to palpation and has normal range of motion.     LTSI m/u/r  2+ RP  + EPL, IO, FDS, FDP     Imaging:  3 views of the right shoulder:  positive for degenerative changes of the AC joint. The humeral head is well centered on the AP and axillary views.  There is sclerosis at the rotator cuff insertion on the greater tuberosity. There is not significant degenerative change of the glenohumeral joint or posterior subluxation of the humeral head. No acute changes or fracture.      C spine MRI: Foraminal stenosis at multiple levels     I personally reviewed and interpreted the patient's imaging obtained today in clinic and prior to visit      Assessment: 59 y.o. male with right subacromial bursitis  impingement syndrome biceps tendinitis  rotator cuff tendinitis    I explained my diagnostic impression and the reasoning behind it in detail, using layman's terms.  Models and/or pictures were used to help in the explanation.  We discussed non operative and operative treatment modalities -- He would like to start with non-operative treatment in the form of injection, PT.     Plan:   - Injection of the right subacromial space  performed, please see procedure note for more details.  Prior to the injection risks and benefits of corticosteroid injection were discussed with the patient including pain, infection,  bleeding, skin color changes, swelling, steroid flare. We discussed that over time injections can result in chondral damage, acceleration of arthritis formation, damage to tendons and damage to joints.  The patient consented for the procedure.  Post-injection instructions were given to the patient in writing.I discussed the risk of increased blood glucose following steroid injection in the setting of diabetes- the patient will monitor closely for the next 24 hours and call her primary care physician with any questions or concerns regarding blood glucose control   - PT referral placed   - Return to clinic in 12 weeks. Return sooner if symptoms worsen or fail to improve.    All questions were answered in detail. The patient is in full agreement with the treatment plan and will proceed accordingly.    A note notifying Dr. Don Artis of my findings was sent via the electronic medical record     This note was created by combination of typed  and M-Modal dictation. Transcription and phonetic errors may be present.  If there are any questions, please contact me.

## 2020-01-07 NOTE — PROGRESS NOTES
CC: This 59 y.o. White male  is here for evaluation of  T2DM along with comorbidities indicated in the Visit Diagnosis section of this encounter.    HPI: Cortes Schroeder was diagnosed with T2DM in 1995. No medications started until 1999 under Dr. Guerrero's care. He was on an [place] insulin pump under Dr. Marx's care for several years until ~ 2015 but stopped it bc of the cost of insulin pump supplies. Prefers Omnipod over tubed insulin pump bc he got tubing tangled often.     Pt was previously followed by Dr. Mills but transferred to [facility] when she no longer took his insurance.           Prior visit 9/30/19  He has switched to Humalog and finds his BGs better compared to Fiasp and Novolog.  He is using Freestyle Armani CGM and enjoys it. However, he is not testing BGs consistently every day. See [employer] report in Media.   Continues not to bolus prior to most meals. He went 3 days last weekend without any boluses at all. Tends to deliver correction boluses but not food boluses.   Plan Matthias discussion with patient - this has been the same barrier to DM control since initial visit in Endocrine:   Must improve bolus adherence.   Must monitor blood sugar 4x/day every day in order to improve blood glucose control and also to continue receiving insulin pump and CGM supplies.   Return to clinic in 3 months with labs prior.     Interval history  A1c is down from 11.7 to 10%. He received a functional PDM just a month and a half ago. He notes BGs have been better. He has all the supplies he needs for his pump finally.   Patient continues to forget to bolus ac especially in the evenings. He tends to bolus for 45 grams of carbs before meals but sometimes eats more than this. He forgot to bolus for food on [place] Day and BG dmitry to 477.   Cannot use the meal reminder alerts on his Omnipod PDM because he cannot hear the alarm. He is willing to set alarms on his phone.       HOSPITALIZED FOR DIABETES OR RELATED  COMPLICATIONS -  Yes -   DKA presumably r/t flu - Feb 2018   DKA 11/2016  DKA 2/2015    PRESCRIBED DIABETES MEDICATIONS: metformin 1000 mg bid, Humalog in Omnipod pump     Misses medication doses - YES     Tends to bolus for set meals under 15, 30, 45, 60 grams carbs. He does feel comfortable with carb counting.     Basal rate  12 AM - 2.8 u/hr  5 am -  2.4 u/hr  ICR 3, ISF 15, goal 120, active insulin time 3 hours     Changes pod every 1.5 days.     DM COMPLICATIONS: nephropathy and peripheral neuropathy    SIGNIFICANT DIABETES MED HISTORY:   DKA on Invokana in 2/2015   Switched from Novolog 70/30 to toujeo/novolog ~ January 2018   januvia stopped late 2018; it was ineffective   Does not find Fiasp or Novolog  effective.     SELF MONITORING BLOOD GLUCOSE: Checks blood glucose at home - 0-4x/day.   CGM interpretation: Glucoses mostly high d/t lack of insulin delivery prior to eating. Also underestimates carb amounts. He tends to bolus 45 grams of carbs for all meals even when he consumes more. There was one day he bolused 3x ac and BGs were much better.     HYPOGLYCEMIC EPISODES: none      CURRENT DIET: eats 2 meals/day. Rarely eats 3 meals/day. Usually eats dinner every night.  Loves [employer].     CURRENT EXERCISE: none d/t back pain     SOCIAL: his friend who has DM lives with him.       BP (!) 149/91 (BP Location: Right arm, Patient Position: Sitting, BP Method: Large (Automatic))   Pulse 102   Wt 96.3 kg (212 lb 6.4 oz)   BMI 35.35 kg/m²       ROS:   CONSTITUTIONAL: Appetite good, + fatigue        PHYSICAL EXAM:  GENERAL: Well developed, well nourished. No acute distress.   PSYCH: AAOx3, appropriate mood and affect, conversant, well-groomed. Judgement and insight good.   NEURO: Cranial nerves grossly intact. Speech clear, no tremor.   CHEST: Respirations even and unlabored.   FEET: No skin breakdown.       Hemoglobin A1C   Date Value Ref Range Status   12/28/2019 10.0 (H) 4.0 - 5.6 % Final     Comment:      ADA Screening Guidelines:  5.7-6.4%  Consistent with prediabetes  >or=6.5%  Consistent with diabetes  High levels of fetal hemoglobin interfere with the HbA1C  assay. Heterozygous hemoglobin variants (HbS, HgC, etc)do  not significantly interfere with this assay.   However, presence of multiple variants may affect accuracy.     07/27/2019 11.7 (H) 4.0 - 5.6 % Final     Comment:     ADA Screening Guidelines:  5.7-6.4%  Consistent with prediabetes  >or=6.5%  Consistent with diabetes  High levels of fetal hemoglobin interfere with the HbA1C  assay. Heterozygous hemoglobin variants (HbS, HgC, etc)do  not significantly interfere with this assay.   However, presence of multiple variants may affect accuracy.     04/27/2019 11.6 (H) 4.0 - 5.6 % Final     Comment:     ADA Screening Guidelines:  5.7-6.4%  Consistent with prediabetes  >or=6.5%  Consistent with diabetes  High levels of fetal hemoglobin interfere with the HbA1C  assay. Heterozygous hemoglobin variants (HbS, HgC, etc)do  not significantly interfere with this assay.   However, presence of multiple variants may affect accuracy.             Chemistry        Component Value Date/Time     12/28/2019 0919    K 5.0 12/28/2019 0919     12/28/2019 0919    CO2 27 12/28/2019 0919    BUN 16 12/28/2019 0919    CREATININE 1.3 12/28/2019 0919     (H) 12/28/2019 0919        Component Value Date/Time    CALCIUM 9.3 12/28/2019 0919    ALKPHOS 117 12/28/2019 0919    AST 23 12/28/2019 0919    ALT 38 12/28/2019 0919    BILITOT 0.3 12/28/2019 0919    ESTGFRAFRICA >60.0 12/28/2019 0919    EGFRNONAA >60.0 12/28/2019 0919          Lab Results   Component Value Date    LDLCALC 47.4 (L) 12/28/2019        Ref. Range 12/28/2019 09:19   Cholesterol Latest Ref Range: 120 - 199 mg/dL 119 (L)   HDL Latest Ref Range: 40 - 75 mg/dL 37 (L)   Hdl/Cholesterol Ratio Latest Ref Range: 20.0 - 50.0 % 31.1   LDL Cholesterol External Latest Ref Range: 63.0 - 159.0 mg/dL 47.4 (L)    Non-HDL Cholesterol Latest Units: mg/dL 82   Total Cholesterol/HDL Ratio Latest Ref Range: 2.0 - 5.0  3.2   Triglycerides Latest Ref Range: 30 - 150 mg/dL 173 (H)       Lab Results   Component Value Date    MICALBCREAT 98.9 (H) 12/28/2019           STANDARDS of CARE:        ASA:               Last eye exam: 4/2018      ASSESSMENT and PLAN:    A1C GOAL: < 7 %     1. Diabetes mellitus type 2, uncontrolled, with complications  Recommend that you set alarms on your phone at 6 am, 11 am, and 6 pm.   The biggest barrier remains remembering to bolus insulin prior to meals. This is why your blood sugars rise to 200-400s.   No changes to insulin pump settings. Work on bolus adherence and testing glucose.   Test glucose 4x/day with jared or fingersticks.   Return to clinic in 3 months with labs prior.       Hemoglobin A1c   2. NPDR (nonproliferative diabetic retinopathy)  Improve glycemic control.      3. Essential hypertension  controlled        Spent 40 minutes with patient with >50% time spent in counseling, as noted in # 1-3.        Patient must change pods every 1.5 days.     Orders Placed This Encounter   Procedures    Hemoglobin A1c     Standing Status:   Future     Standing Expiration Date:   3/7/2021        Follow up in about 3 months (around 4/7/2020).

## 2020-01-22 DIAGNOSIS — F32.A DEPRESSION, UNSPECIFIED DEPRESSION TYPE: ICD-10-CM

## 2020-01-22 DIAGNOSIS — G89.4 CHRONIC PAIN SYNDROME: ICD-10-CM

## 2020-01-22 DIAGNOSIS — Z20.6 EXPOSURE TO HIV: ICD-10-CM

## 2020-01-22 RX ORDER — DULOXETIN HYDROCHLORIDE 60 MG/1
CAPSULE, DELAYED RELEASE ORAL
Qty: 30 CAPSULE | Refills: 0 | Status: SHIPPED | OUTPATIENT
Start: 2020-01-22 | End: 2020-03-09

## 2020-01-23 RX ORDER — EMTRICITABINE AND TENOFOVIR DISOPROXIL FUMARATE 200; 300 MG/1; MG/1
TABLET, FILM COATED ORAL
Qty: 30 TABLET | Refills: 2 | Status: SHIPPED | OUTPATIENT
Start: 2020-01-23 | End: 2020-05-11

## 2020-02-10 RX ORDER — FLASH GLUCOSE SENSOR
KIT MISCELLANEOUS
Qty: 2 KIT | Refills: 11 | Status: SHIPPED | OUTPATIENT
Start: 2020-02-10 | End: 2021-07-21

## 2020-02-14 ENCOUNTER — PATIENT MESSAGE (OUTPATIENT)
Dept: FAMILY MEDICINE | Facility: CLINIC | Age: 60
End: 2020-02-14

## 2020-02-14 DIAGNOSIS — R05.9 COUGH: ICD-10-CM

## 2020-02-14 RX ORDER — PROMETHAZINE HYDROCHLORIDE AND DEXTROMETHORPHAN HYDROBROMIDE 6.25; 15 MG/5ML; MG/5ML
SYRUP ORAL
Qty: 240 ML | Refills: 3 | Status: SHIPPED | OUTPATIENT
Start: 2020-02-14 | End: 2022-02-09

## 2020-02-24 DIAGNOSIS — E11.42 DIABETIC PERIPHERAL NEUROPATHY ASSOCIATED WITH TYPE 2 DIABETES MELLITUS: ICD-10-CM

## 2020-02-24 RX ORDER — GABAPENTIN 100 MG/1
CAPSULE ORAL
Qty: 120 CAPSULE | Refills: 1 | Status: SHIPPED | OUTPATIENT
Start: 2020-02-24 | End: 2020-04-08

## 2020-03-02 ENCOUNTER — PATIENT OUTREACH (OUTPATIENT)
Dept: ADMINISTRATIVE | Facility: OTHER | Age: 60
End: 2020-03-02

## 2020-03-02 ENCOUNTER — OFFICE VISIT (OUTPATIENT)
Dept: PODIATRY | Facility: CLINIC | Age: 60
End: 2020-03-02
Payer: COMMERCIAL

## 2020-03-02 VITALS — WEIGHT: 212.31 LBS | HEIGHT: 65 IN | BODY MASS INDEX: 35.37 KG/M2

## 2020-03-02 DIAGNOSIS — M20.5X2 HALLUX LIMITUS, ACQUIRED, LEFT: ICD-10-CM

## 2020-03-02 DIAGNOSIS — M20.41 HAMMER TOES OF BOTH FEET: ICD-10-CM

## 2020-03-02 DIAGNOSIS — S96.219A: ICD-10-CM

## 2020-03-02 DIAGNOSIS — E11.9 COMPREHENSIVE DIABETIC FOOT EXAMINATION, TYPE 2 DM, ENCOUNTER FOR: ICD-10-CM

## 2020-03-02 DIAGNOSIS — R20.2 PARESTHESIA OF BOTH FEET: ICD-10-CM

## 2020-03-02 DIAGNOSIS — M20.5X1 HALLUX LIMITUS, ACQUIRED, RIGHT: ICD-10-CM

## 2020-03-02 DIAGNOSIS — M20.42 HAMMER TOES OF BOTH FEET: ICD-10-CM

## 2020-03-02 PROCEDURE — 3008F BODY MASS INDEX DOCD: CPT | Mod: CPTII,S$GLB,, | Performed by: PODIATRIST

## 2020-03-02 PROCEDURE — 99999 PR PBB SHADOW E&M-EST. PATIENT-LVL III: CPT | Mod: PBBFAC,,, | Performed by: PODIATRIST

## 2020-03-02 PROCEDURE — 3008F PR BODY MASS INDEX (BMI) DOCUMENTED: ICD-10-PCS | Mod: CPTII,S$GLB,, | Performed by: PODIATRIST

## 2020-03-02 PROCEDURE — 3046F HEMOGLOBIN A1C LEVEL >9.0%: CPT | Mod: CPTII,S$GLB,, | Performed by: PODIATRIST

## 2020-03-02 PROCEDURE — 99214 PR OFFICE/OUTPT VISIT, EST, LEVL IV, 30-39 MIN: ICD-10-PCS | Mod: S$GLB,,, | Performed by: PODIATRIST

## 2020-03-02 PROCEDURE — 99999 PR PBB SHADOW E&M-EST. PATIENT-LVL III: ICD-10-PCS | Mod: PBBFAC,,, | Performed by: PODIATRIST

## 2020-03-02 PROCEDURE — 99214 OFFICE O/P EST MOD 30 MIN: CPT | Mod: S$GLB,,, | Performed by: PODIATRIST

## 2020-03-02 PROCEDURE — 3046F PR MOST RECENT HEMOGLOBIN A1C LEVEL > 9.0%: ICD-10-PCS | Mod: CPTII,S$GLB,, | Performed by: PODIATRIST

## 2020-03-02 NOTE — PATIENT INSTRUCTIONS
Recommend lotions: eucerin, eucerin for diabetics, aquaphor, A&D ointment, gold bond for diabetics, sween, Sweet Briar's Bees all purpose baby ointment,  urea 40 with aloe (found on amazon.com)    Shoe recommendations: (try 6pm.com, zappos.BabbaCo (acquired by [employer] in 2014) , nordstromrack.BabbaCo (acquired by [employer] in 2014), or shoes.BabbaCo (acquired by [employer] in 2014) for discounted prices) you can visit DSW shoes in [place]  or [employer] in the [facility] (there are also several shoe brand outlets in the [facility])    Asics (GT 2000 or gel foundations), new balance stability type shoes, saucony (stabil c3),  Madison (GTS or Beast or transcend), propet (tennis shoe)    Sofft Tommy (women) Sonido&Corey (men), clarks, crocs, aerosoles, naturalizers, SAS, ecco, born, yesica vizcarra, rockports (dress shoes)    Vionic, burkenstocks, fitflops, propet (sandals)  Nike comfort thong sandals, crocs, propet (house shoes)    Nail Home remedy:  Vicks Vapor rub to nails for easier manageability      Understanding Plantar Fasciitis    Plantar fasciitis is a condition that causes foot and heel pain. The plantar fascia is a tough band of tissue that runs across the bottom of the foot from the heel to the toes. This tissue pulls on the heel bone. It supports the arch of the foot as it pushes off the ground. If the tissue becomes irritated or red and swollen (inflamed), it is called plantar fasciitis.  How to say it  PLAN-tuhr fa-see-IY-tis   What causes plantar fasciitis?  Plantar fasciitis most often occurs from overusing the plantar fascia. The tissue may become damaged from activities that put repeated stress on the heel and foot. Or it may wear down over time with age and ankle stiffness. You are more likely to have plantar fasciitis if you:  · Do activities that require a lot of running, jumping, or dancing  · Have a job that requires being on your feet for long periods  · Are overweight or obese  · Have certain foot problems, such as a tight Achilles tendon, flat feet, or high arches  · Often wear poorly fitting  shoes  Symptoms of plantar fasciitis  The condition most often causes pain in the heel and the bottom of the foot. The pain may occur when you take your first steps in the morning. It may get better as you walk throughout the day. But as you continue to put weight on the foot, the pain often returns. Pain may also occur after standing or sitting for long periods.  Treating plantar fasciitis  Treatments for plantar fasciitis include:  · Resting the foot. This involves limiting movements that make your foot hurt. You may also need to avoid certain sports and types of work for a time.  · Using cold packs. Put an ice pack on the heel and foot to help reduce pain and swelling.  · Taking pain medicines. Prescription and over-the-counter pain medicines can help relieve pain and swelling.  · Using heel cups or foot inserts (orthotics). These are placed in the shoes to help support the heel or arch and cushion the heel. You may also be told to buy proper-fitting shoes with good arch support and cushioned soles.  · Taping the foot. This supports the arch and limits the movement of the plantar fascia to help relieve symptoms.  · Wearing a night splint. This stretches the plantar fascia and leg muscles while you sleep. This may help relieve pain.  · Doing exercises and physical therapy. These stretch and strengthen the plantar fascia and the muscles in the leg that support the heel and foot.  · Getting shots of medicine into the foot. These may help relieve symptoms for a time.  · Having surgery. This may be needed if other treatments fail to relieve symptoms. During surgery, the surgeon may partially cut the plantar fascia to release tension.  Possible complications of plantar fasciitis  Without proper care and treatment, healing may take longer than normal. Also, symptoms may continue or get worse. Over time, the plantar fascia may be damaged. This can make it hard to walk or even stand without pain.  When to call your  healthcare provider  Call your healthcare provider right away if you have any of these:  · Fever of 100.4°F (38°C) or higher, or as directed  · Symptoms that dont get better with treatment, or get worse  · New symptoms, such as numbness, tingling, or weakness in the foot   © 2349-5633 [employer]. [address] [place] [address]. All rights reserved. This information is not intended as a substitute for professional medical care. Always follow your healthcare professional's instructions.        Treating Plantar Fasciitis  First, your healthcare provider tries to determine the cause of your problem in order to suggest ways to relieve pain. If your pain is due to poor foot mechanics, custom-made shoe inserts (orthoses) may help.    Reduce symptoms  · To relieve mild symptoms, try aspirin, ibuprofen, or other medicines as directed. Rubbing ice on the affected area may also help.  · To reduce severe pain and swelling, your healthcare provider may prescribe pills or injections or a walking cast in some instances. Physical therapy, such as ultrasound or a daily stretching program, may also be recommended. Surgery is rarely required.  · To reduce symptoms caused by poor foot mechanics, your foot may be taped. This supports the arch and temporarily controls movement. Night splints may also help by stretching the fascia.  Control movement  If taping helps, your healthcare provider may prescribe orthoses. Built from plaster casts of your feet, these inserts control the way your foot moves. As a result, your symptoms should go away.  Reduce overuse  Every time your foot strikes the ground, the plantar fascia is stretched. You can reduce the strain on the plantar fascia and the possibility of overuse by following these suggestions:  · Lose any excess weight.  · Avoid running on hard or uneven ground.  · Use orthoses at all times in your shoes and house slippers.  If surgery is needed  Your healthcare  provider may consider surgery if other types of treatment don't control your pain. During surgery, the plantar fascia is partially cut to release tension. As you heal, fibrous tissue fills the space between the heel bone and the plantar fascia.   © 9995-9508 [employer]. [address] [place] [address]. All rights reserved. This information is not intended as a substitute for professional medical care. Always follow your healthcare professional's instructions.        Wearing Proper Shoes                    You walk on your feet every day, forcing them to support the weight of your body. Repeated stress on your feet can cause damage over time. The right shoes can help protect your feet. The wrong shoes can cause more foot problems. Read the information below to help you find a shoe that fits your foot needs.     A good shoe fit will cover your foot outline.       A shoe that doesnt cover the outline is a bad fit.      Whats Your Foot Shape?  To get a good fit, you need to know the shape of your foot. Do this simple test: While standing, place your foot on a piece of paper and trace around it. Is your foot straight or curved? Do you have a foot problem, such as a bunion, that causes your foot outline to show a bulge on the side of your big toe?  Finding Your Fit  Bring your foot outline to the [employer] store to help you find the right shoe. Place a shoe you like on top of the outline to see if it matches the shape. The shoe should cover the outline. (If you have a bunion, the shoe may not cover the bulge on the outline. Look for soft leather shoes to stretch over the bunion.) Once youve found a pair of proper shoes, put them on. Walk around. Be sure the shoes dont rub or pinch. If the shoes feel good, youve found your fit!  The Right Shoe for You  A good shoe has features that provide comfort and support. It must also be the right size and shape for your feet. Look for a shoe made of  breathable fabric and lining, such as leather or canvas. Be sure that shoes have enough tread to prevent slipping. Go to a good shoe store for help finding the right shoe.  Good Shoe Features  An ideal shoe has the following:  · Laces for support. If tying laces is a problem for you, try shoes with Velcro fasteners or gerson.  · A front of the shoe (toe box) with ½ inch space in front of your longest toes.  · An arch shape that supports your foot.  · No more than 1½ inches of heel.  · A stiff, snug back of the shoe to keep your foot from sliding around.  · A smooth lining with no rough seams.  Shoe Shopping Tips  Below are some dos and donts for when you go to the shoe store.  Do:  · Select the shoes that feel right. Wear them around the house. Then bring them to your foot doctor to check for fit. If they dont fit well, return them.  · Shop late in the day, when your feet will be slightly bigger.  · Each time you buy shoes, have both your feet measured while you are standing. Foot size changes with time.  · Pick shoes to suit their purpose. High heels are okay for an occasional night on the town. But for everyday wear, choose a more sensible shoe.  · Try on shoes while wearing any inserts specially made for your feet (orthoses).  · Try on both the right and left shoes. If your feet are different sizes, pick a pair that fits the larger foot.  Dont:  · Dont buy shoes based on shoe size alone. Always try on shoes, as sizes differ from brand to brand and within brands.  · Dont expect shoes to break in. If they dont fit at the store, dont buy them.  · Dont buy a shoe that doesnt match your foot shape.  What About Socks?  Always wear socks with shoes. Socks help absorb sweat and reduce friction and blistering. When shopping for shoes, choose soft, padded socks with seams that dont irritate your feet.  If You Have Foot Problems  Some foot problems cause deformities. This can make it hard to find a good fit.  Look for shoes made of soft leather to stretch over the deformity. If you have bunions, buy shoes with a wider toe box. To fit hammertoes, look for shoes with a tall toe box. If you have arch problems, you may need inserts. In some cases, youll need to have custom footwear or orthoses made for your feet.  Suggested Footwear  Ask your healthcare provider what kind of footwear you need. He or she may recommend a certain brand or shoe store.  © 6411-2465 [employer]. [address] [place] [address]. All rights reserved. This information is not intended as a substitute for professional medical care. Always follow your healthcare professional's instructions.        Toe Extension (Flexibility)    These instructions are for your right foot. Switch sides for your left foot.  1. Sit in a chair. Rest your right ankle on your left knee.  2. Hold your toes with your right hand. Gently bend the toes backward. Feel a stretch in the undersides of the toes and ball of the foot. Hold for 30 to 60 seconds.  3. Then gently bend the toes in the other direction. Gently press on them until your foot is pointed. Hold for 30 to 60 seconds.  4. Repeat 5 times, or as instructed.  © 2135-0516 [employer]. [address] [place] [address]. All rights reserved. This information is not intended as a substitute for professional medical care. Always follow your healthcare professional's instructions.          Diabetes: Inspecting Your Feet  Diabetes increases your chances of developing foot problems. So inspect your feet every day. This helps you find small skin irritations before they become serious infections. If you have trouble seeing the bottoms of your feet, use a mirror or ask a family member or friend to help.     Pressure spots on the bottom of the foot are common areas where problems develop.   How to check your feet  Below are tips to help you look for foot problems. Try to check your feet  at the same time each day, such as when you get out of bed in the morning:  · Check the top of each foot. The tops of toes, back of the heel, and outer edge of the foot can get a lot of rubbing from poor-fitting shoes.  · Check the bottom of each foot. Daily wear and tear often leads to problems at pressure spots.  · Check the toes and nails. Fungal infections often occur between toes. Toenail problems can also be a sign of fungal infections or lead to breaks in the skin.  · Check your shoes, too. Loose objects inside a shoe can injure the foot. Use your hand to feel inside your shoes for things like kayden, loose stitching, or rough areas that could irritate your skin.  Warning signs  Look for any color changes in the foot. Redness with streaks can signal a severe infection, which needs immediate medical attention. Tell your doctor right away if you have any of these problems:  · Swelling, sometimes with color changes, may be a sign of poor blood flow or infection. Symptoms include tenderness and an increase in the size of your foot.  · Warm or hot areas on your feet may be signs of infection. A foot that is cold may not be getting enough blood.  · Sensations such as burning, tingling, or pins and needles can be signs of a problem. Also check for areas that may be numb.  · Hot spots are caused by friction or pressure. Look for hot spots in areas that get a lot of rubbing. Hot spots can turn into blisters, calluses, or sores.  · Cracks and sores are caused by dry or irritated skin. They are a sign that the skin is breaking down, which can lead to infection.  · Toenail problems to watch for include nails growing into the skin (ingrown toenail) and causing redness or pain. Thick, yellow, or discolored nails can signal a fungal infection.  · Drainage and odor can develop from untreated sores and ulcers. Call your doctor right away if you notice white or yellow drainage, bleeding, or unpleasant odor.   © 0825-1400  [employer]. [address] [place] [address]. All rights reserved. This information is not intended as a substitute for professional medical care. Always follow your healthcare professional's instructions.        Step-by-Step:  Inspecting Your Feet (Diabetes)    Date Last Reviewed: 10/1/2016  © 6561-5950 [employer]. [address] [place] [address]. All rights reserved. This information is not intended as a substitute for professional medical care. Always follow your healthcare professional's instructions.          Recommend lotions: eucerin, eucerin for diabetics, aquaphor, A&D ointment, gold bond for diabetics, sween, Amelia's Bees all purpose baby ointment,  urea 40 with aloe (found on amazon.com)    Shoe recommendations: (try 6pm.com, zappos.BiBCOM , nordstromrack.BiBCOM, or shoes.[employer] for discounted prices) you can visit DSW shoes in [place]  or [employer] [facility] in the [facility] (there are also several shoe brand outlets in the [facility])    Asics (GT 2000 or gel foundations), new balance stability type shoes, saucony (stabil c3),  Madison (GTS or Beast or transcend), propet (tennis shoe)    Sofft Brand or axxiom (women) Sonido&Corey (men), clarks, crocs, aerosoles, naturalizers, SAS, ecco, born, yesica vizcarra, rockports (dress shoes)    Vionic, burkenstocks, fitflops, propet (sandals)  Nike comfort thong sandals, crocs, propet (house shoes)    Nail Home remedy:  Vicks Vapor rub to nails for easier manageability    Diabetes: Inspecting Your Feet  Diabetes increases your chances of developing foot problems. So inspect your feet every day. This helps you find small skin irritations before they become serious infections. If you have trouble seeing the bottoms of your feet, use a mirror or ask a family member or friend to help.     Pressure spots on the bottom of the foot are common areas where problems develop.   How to check your feet  Below are tips to help you look for foot  problems. Try to check your feet at the same time each day, such as when you get out of bed in the morning:  · Check the top of each foot. The tops of toes, back of the heel, and outer edge of the foot can get a lot of rubbing from poor-fitting shoes.  · Check the bottom of each foot. Daily wear and tear often leads to problems at pressure spots.  · Check the toes and nails. Fungal infections often occur between toes. Toenail problems can also be a sign of fungal infections or lead to breaks in the skin.  · Check your shoes, too. Loose objects inside a shoe can injure the foot. Use your hand to feel inside your shoes for things like kayden, loose stitching, or rough areas that could irritate your skin.  Warning signs  Look for any color changes in the foot. Redness with streaks can signal a severe infection, which needs immediate medical attention. Tell your doctor right away if you have any of these problems:  · Swelling, sometimes with color changes, may be a sign of poor blood flow or infection. Symptoms include tenderness and an increase in the size of your foot.  · Warm or hot areas on your feet may be signs of infection. A foot that is cold may not be getting enough blood.  · Sensations such as burning, tingling, or pins and needles can be signs of a problem. Also check for areas that may be numb.  · Hot spots are caused by friction or pressure. Look for hot spots in areas that get a lot of rubbing. Hot spots can turn into blisters, calluses, or sores.  · Cracks and sores are caused by dry or irritated skin. They are a sign that the skin is breaking down, which can lead to infection.  · Toenail problems to watch for include nails growing into the skin (ingrown toenail) and causing redness or pain. Thick, yellow, or discolored nails can signal a fungal infection.  · Drainage and odor can develop from untreated sores and ulcers. Call your doctor right away if you notice white or yellow drainage, bleeding, or  unpleasant odor.   © 4018-6394 [employer]. [address] [place] [address]. All rights reserved. This information is not intended as a substitute for professional medical care. Always follow your healthcare professional's instructions.        Step-by-Step:  Inspecting Your Feet (Diabetes)    Date Last Reviewed: 10/1/2016  © 4299-3402 [employer]. [address] [place] [address]. All rights reserved. This information is not intended as a substitute for professional medical care. Always follow your healthcare professional's instructions.

## 2020-03-02 NOTE — PROGRESS NOTES
Subjective:      Patient ID: Cortes Schroeder is a 60 y.o. male.    Chief Complaint: Diabetes Mellitus (ov 1/3/20 Dr Artis PCP) and Diabetic Foot Exam    Cortes is a 60 y.o. male who presents to the clinic upon referral from . No ref. provider found  for evaluation and treatment of diabetic feet. Cortes has a past medical history of Anxiety (12/18/2012), Back pain (12/18/2012), Cataract, Chronic pain syndrome (4/24/2016), Diabetes type 2, controlled (2/20/2016), Diabetic retinopathy, DM (diabetes mellitus) (12/18/2012), DM (diabetes mellitus), type 2, uncontrolled (11/16/2013), Essential hypertension (2/20/2016), Gastroesophageal reflux disease without esophagitis (2/20/2016), Hyperlipidemia (12/18/2012), Insomnia (8/7/2014), and Neuropathy (11/16/2013). Patient relates no major problem with feet. Only complaints today consist of increased numbness, tingling, burning to the feet.    PCP: Don Artis MD    Date Last Seen by PCP:   Chief Complaint   Patient presents with    Diabetes Mellitus     ov 1/3/20 Dr Artis PCP    Diabetic Foot Exam         Current shoe gear: teddy     Hemoglobin A1C   Date Value Ref Range Status   12/28/2019 10.0 (H) 4.0 - 5.6 % Final     Comment:     ADA Screening Guidelines:  5.7-6.4%  Consistent with prediabetes  >or=6.5%  Consistent with diabetes  High levels of fetal hemoglobin interfere with the HbA1C  assay. Heterozygous hemoglobin variants (HbS, HgC, etc)do  not significantly interfere with this assay.   However, presence of multiple variants may affect accuracy.     07/27/2019 11.7 (H) 4.0 - 5.6 % Final     Comment:     ADA Screening Guidelines:  5.7-6.4%  Consistent with prediabetes  >or=6.5%  Consistent with diabetes  High levels of fetal hemoglobin interfere with the HbA1C  assay. Heterozygous hemoglobin variants (HbS, HgC, etc)do  not significantly interfere with this assay.   However, presence of multiple variants may affect accuracy.     04/27/2019 11.6 (H) 4.0 - 5.6  % Final     Comment:     ADA Screening Guidelines:  5.7-6.4%  Consistent with prediabetes  >or=6.5%  Consistent with diabetes  High levels of fetal hemoglobin interfere with the HbA1C  assay. Heterozygous hemoglobin variants (HbS, HgC, etc)do  not significantly interfere with this assay.   However, presence of multiple variants may affect accuracy.                 Patient Active Problem List   Diagnosis    Hyperlipidemia    Anxiety    Chronic bilateral low back pain without sciatica    Neuropathy    Uncontrolled type 2 diabetes mellitus with diabetic polyneuropathy, with long-term current use of insulin    NPDR (nonproliferative diabetic retinopathy)    Insomnia    Gastroesophageal reflux disease without esophagitis    Hypertension, well controlled    Chronic pain syndrome    Diabetic nephropathy associated with type 2 diabetes mellitus    Right sided weakness    Cervical syndrome    Chronic neck pain    Muscle weakness    Impaired motor control    Decreased range of motion (ROM) of shoulder    Cervical spondylosis without myelopathy    Neuroforaminal stenosis of cervical spine    Right cervical radiculopathy    Cervical radiculopathy    DDD (degenerative disc disease), lumbar       Current Outpatient Medications on File Prior to Visit   Medication Sig Dispense Refill    cyanocobalamin (VITAMIN B-12) 100 MCG tablet Take 100 mcg by mouth once daily.      diabetic supplies, miscellan. Kit Please change sensor every 14 days. FreeStyle [employer]ani Sensor 30-day supply (2 sensors/month) 2 kit 5    DULoxetine (CYMBALTA) 60 MG capsule TAKE 1 CAPSULE(60 MG) BY MOUTH EVERY DAY 30 capsule 0    fish oil-omega-3 fatty acids 300-1,000 mg capsule Take by mouth 2 (two) times daily.      FREESTYLE ARMANI 14 DAY SENSOR Kit Change sensors every 14 days. 2 kit 11    gabapentin (NEURONTIN) 100 MG capsule TAKE 1 CAPSULE BY MOUTH EVERY MORNING THEN TAKE 3 CAPSULES BY MOUTH EVERY NIGHT AT BEDTIME 120 capsule 1     HUMALOG U-100 INSULIN 100 unit/mL injection Use in insulin pump; max dose 150 units per day. 50 mL 5    insulin pump controller (OMNIPOD DASH PDM KIT MISC) by Misc.(Non-Drug; Combo Route) route.      losartan (COZAAR) 50 MG tablet TAKE 1 TABLET(50 MG) BY MOUTH EVERY DAY 90 tablet 3    magnesium oxide-Mg AA chelate (MG-PLUS-PROTEIN) 133 mg Tab Take 500 mg by mouth every evening.       metFORMIN (GLUCOPHAGE) 1000 MG tablet TAKE 1 TABLET(1000 MG) BY MOUTH TWICE DAILY WITH MEALS 180 tablet 2    nortriptyline (PAMELOR) 50 MG capsule Take 1 capsule (50 mg total) by mouth nightly. 30 capsule 2    pravastatin (PRAVACHOL) 80 MG tablet TAKE 1 TABLET(80 MG) BY MOUTH EVERY DAY 90 tablet 3    promethazine-dextromethorphan (PROMETHAZINE-DM) 6.25-15 mg/5 mL Syrp Take 5ml qhs prn cough 240 mL 3    PROPYLENE GLYCOL//PF (SYSTANE, PF, OPHT) Apply to eye 4 (four) times daily.      TRUVADA 200-300 mg Tab TAKE 1 TABLET BY MOUTH EVERY DAY 30 tablet 2    vitamin D 1000 units Tab Take 185 mg by mouth 2 (two) times daily.       aspirin (ECOTRIN) 81 MG EC tablet Take 1 tablet (81 mg total) by mouth once daily.  0     No current facility-administered medications on file prior to visit.        Review of patient's allergies indicates:   Allergen Reactions    Invokana [canagliflozin] Anaphylaxis    Percocet [oxycodone-acetaminophen] Nausea Only and Hallucinations    Biaxin [clarithromycin]     Hydrocodone Other (See Comments)     Dizzy/nausea/hallucinations    Sulfa (sulfonamide antibiotics) Nausea Only and Rash       Past Surgical History:   Procedure Laterality Date    BACK SURGERY  2003    Lumbar Spine    EPIDURAL STEROID INJECTION N/A 1/16/2019    Procedure: Injection, Steroid, Epidural Cervical;  Surgeon: Andrew Sinclair Jr., MD;  Location: [facility];  Service: Pain Management;  Laterality: N/A;  Cervical Epidural Steroid Injection C7-T1    83251    Arrive @ 1240    EPIDURAL STEROID INJECTION N/A 2/20/2019     Procedure: Injection, Steroid, Epidural;  Surgeon: Andrew Sinclair Jr., MD;  Location: [facility];  Service: Pain Management;  Laterality: N/A;  Lumbar Epidural Steroid Injection L4-5    17851    Arrive @ 1215 (requests latest time)       Family History   Problem Relation Age of Onset    Cataracts Father     Hypertension Father     Parkinsonism Father     Alzheimer's disease Father     Migraines Mother     Hypertension Mother     Heart attack Mother     Amblyopia Neg Hx     Blindness Neg Hx     Glaucoma Neg Hx     Macular degeneration Neg Hx     Retinal detachment Neg Hx     Strabismus Neg Hx        Social History     Socioeconomic History    Marital status:      Spouse name: Not on file    Number of children: Not on file    Years of education: Not on file    Highest education level: Not on file   Occupational History    Not on file   Social Needs    Financial resource strain: Not very hard    Food insecurity:     Worry: Never true     Inability: Never true    Transportation needs:     Medical: No     Non-medical: No   Tobacco Use    Smoking status: Never Smoker    Smokeless tobacco: Never Used   Substance and Sexual Activity    Alcohol use: Yes     Alcohol/week: 1.0 standard drinks     Types: 1 Glasses of wine per week     Frequency: 2-4 times a month     Drinks per session: 1 or 2     Binge frequency: Less than monthly     Comment: occasionally    Drug use: No    Sexual activity: Not Currently     Partners: Male     Birth control/protection: Condom     Comment: 10/2/17    Lifestyle    Physical activity:     Days per week: 2 days     Minutes per session: 30 min    Stress: Only a little   Relationships    Social connections:     Talks on phone: Twice a week     Gets together: Once a week     Attends Mandaen service: Not on file     Active member of club or organization: No     Attends meetings of clubs or organizations: More than 4 times per year     Relationship status:  "   Other Topics Concern    Not on file   Social History Narrative    Not on file       Review of Systems   Constitution: Negative for chills and fever.   Cardiovascular: Negative for claudication and leg swelling.   Respiratory: Negative for cough and shortness of breath.    Skin: Positive for dry skin. Negative for itching, nail changes and rash.   Musculoskeletal: Positive for arthritis, back pain, joint pain and myalgias. Negative for falls, joint swelling and muscle weakness.   Gastrointestinal: Negative for diarrhea, nausea and vomiting.   Neurological: Positive for numbness and paresthesias. Negative for tremors and weakness.   Psychiatric/Behavioral: Negative for altered mental status and hallucinations.           Objective:      Vitals:    03/02/20 1622   Weight: 96.3 kg (212 lb 4.9 oz)   Height: 5' 5" (1.651 m)   PainSc: 0-No pain       Physical Exam   Constitutional: He appears well-developed and well-nourished.  Non-toxic appearance. He does not have a sickly appearance. No distress.   alert and oriented x 3.    Cardiovascular:   Pulses:       Dorsalis pedis pulses are 2+ on the right side, and 2+ on the left side.        Posterior tibial pulses are 2+ on the right side, and 2+ on the left side.    Capillary refill time is within normal limits. Digital hair present.    Pulmonary/Chest: No respiratory distress.   Musculoskeletal: He exhibits no deformity.        Right ankle: No tenderness. No lateral malleolus, no medial malleolus, no AITFL, no CF ligament and no posterior TFL tenderness found. Achilles tendon exhibits no pain, no defect and normal Fuller's test results.        Left ankle: No tenderness. No lateral malleolus, no medial malleolus, no AITFL, no CF ligament and no posterior TFL tenderness found. Achilles tendon exhibits no pain, no defect and normal Fuller's test results.        Right foot: There is tenderness (plantar medial arch). There is no bony tenderness.        Left foot: " There is tenderness (plantar medial arch). There is no bony tenderness.   There is equinus deformity bilateral with decreased dorsiflexion at the ankle joint bilateral. No tenderness with compression of heel. Negative tinels sign. Gait analysis reveals excessive pronation through midstance and propulsion with early heel off. Shoes reveals lateral heel counter wear bilateral     Decreased first MPJ range of motion both weightbearing and nonweightbearing, no crepitus observed the first MP joint, + dorsal flag sign. Mild  bunion deformity is observed .    Patient has hammertoes of digits 2-5 bilateral partially reducible     Muscle strength is 5/5 in all groups bilaterally.           Feet:   Right Foot:   Protective Sensation: 5 sites tested. 5 sites sensed.   Left Foot:   Protective Sensation: 5 sites tested. 5 sites sensed.   Lymphadenopathy:   No lymphatic streaking     Neurological: He displays no atrophy. No sensory deficit.   Light touch present    Paresthesias, and hyperesthesia bilateral feet at toes with no clearly identified trigger or source.       Skin: Skin is warm, dry and intact. No abrasion, no bruising, no burn, no lesion and no rash noted. He is not diaphoretic. No cyanosis or erythema. No pallor. Nails show no clubbing.   Skin is of normal turgor.   Normal temperature gradient.  Examination of the skin reveals no evidence of significant rashes, open lesions, suspicious appearing nevi or other concerning lesions.      Psychiatric: His mood appears not anxious. His affect is not inappropriate. His speech is not slurred. He is not combative. He is communicative. He is attentive.   Nursing note and vitals reviewed.            Assessment:       Encounter Diagnoses   Name Primary?    Uncontrolled type 2 diabetes mellitus with diabetic polyneuropathy, with long-term current use of insulin Yes    Paresthesia of both feet     Comprehensive diabetic foot examination, type 2 DM, encounter for     Hammer  toes of both feet     Hallux limitus, acquired, right     Hallux limitus, acquired, left     Strain of intrinsic muscle of foot          Plan:     Problem List Items Addressed This Visit     Uncontrolled type 2 diabetes mellitus with diabetic polyneuropathy, with long-term current use of insulin - Primary      Other Visit Diagnoses     Paresthesia of both feet        Comprehensive diabetic foot examination, type 2 DM, encounter for        Hammer toes of both feet        Hallux limitus, acquired, right        Hallux limitus, acquired, left        Strain of intrinsic muscle of foot             I counseled the patient on his conditions, their implications and medical management.         Greater than 50% of this visit spent on counseling and coordination of care.    Education about the diabetic foot, neuropathy, and prevention of limb loss.    Shoe inspection. Diabetic Foot Education. Patient reminded of the importance of good nutrition/healthy diet/weight management and blood sugar control to help prevent podiatric complications of diabetes. Patient instructed on proper foot hygeine. Wear comfortable, proper fitting shoes. Wash feet daily. Dry well. After drying, apply moisturizer to feet (no lotion to webspaces). Inspect feet daily for skin breaks, blisters, swelling, or redness. Wear cotton socks (preferably white)  Change socks every day. Do NOT walk barefoot. Do NOT use heating pads or hot water soaks. We discussed wearing proper shoe gear, daily foot inspections, never walking without protective shoe gear.     Discussed edema control and the importance of daily moisturizer to the feet such as Gold bonds diabetic foot cream    Recommend applying vicks vaporub to thick abnormal toenails daily x 6 months to treat fungal nail infection.     Rx diabetic shoes for protection and support    He will continue to monitor the areas daily, inspect his feet, wear protective shoe gear when ambulatory, moisturizer to maintain  skin integrity and follow in this office in approximately 12 months, sooner p.r.n.

## 2020-03-09 DIAGNOSIS — F32.A DEPRESSION, UNSPECIFIED DEPRESSION TYPE: ICD-10-CM

## 2020-03-09 DIAGNOSIS — G89.4 CHRONIC PAIN SYNDROME: ICD-10-CM

## 2020-03-09 RX ORDER — DULOXETIN HYDROCHLORIDE 60 MG/1
CAPSULE, DELAYED RELEASE ORAL
Qty: 30 CAPSULE | Refills: 0 | Status: SHIPPED | OUTPATIENT
Start: 2020-03-09 | End: 2020-04-08

## 2020-03-26 ENCOUNTER — LAB VISIT (OUTPATIENT)
Dept: LAB | Facility: HOSPITAL | Age: 60
End: 2020-03-26
Attending: NURSE PRACTITIONER
Payer: COMMERCIAL

## 2020-03-26 LAB
ESTIMATED AVG GLUCOSE: 206 MG/DL (ref 68–131)
HBA1C MFR BLD HPLC: 8.8 % (ref 4–5.6)

## 2020-03-26 PROCEDURE — 36415 COLL VENOUS BLD VENIPUNCTURE: CPT | Mod: PO

## 2020-03-26 PROCEDURE — 83036 HEMOGLOBIN GLYCOSYLATED A1C: CPT

## 2020-03-27 ENCOUNTER — PATIENT MESSAGE (OUTPATIENT)
Dept: ORTHOPEDICS | Facility: CLINIC | Age: 60
End: 2020-03-27

## 2020-04-01 ENCOUNTER — PATIENT MESSAGE (OUTPATIENT)
Dept: ENDOCRINOLOGY | Facility: CLINIC | Age: 60
End: 2020-04-01

## 2020-04-02 ENCOUNTER — PATIENT OUTREACH (OUTPATIENT)
Dept: ADMINISTRATIVE | Facility: OTHER | Age: 60
End: 2020-04-02

## 2020-04-02 ENCOUNTER — TELEPHONE (OUTPATIENT)
Dept: ENDOCRINOLOGY | Facility: CLINIC | Age: 60
End: 2020-04-02

## 2020-04-02 NOTE — TELEPHONE ENCOUNTER
----- Message from Purvi Hoover sent at 4/2/2020  9:08 AM CDT -----  Type: Patient Call Back    Who called: Lisette / [facility]    What is the request in detail: Rep calling to check on the status of fax that was sent on 03/30 for Freestyle Armani supplies    Can the clinic reply by [facility]? No     Would the patient rather a call back or a response via [facility]? Call back     Best call back number: [phone]  Ext [phone]    Additional Information: Fax # [phone]

## 2020-04-03 ENCOUNTER — TELEPHONE (OUTPATIENT)
Dept: ENDOCRINOLOGY | Facility: CLINIC | Age: 60
End: 2020-04-03

## 2020-04-08 DIAGNOSIS — G89.4 CHRONIC PAIN SYNDROME: ICD-10-CM

## 2020-04-08 DIAGNOSIS — E11.42 DIABETIC PERIPHERAL NEUROPATHY ASSOCIATED WITH TYPE 2 DIABETES MELLITUS: ICD-10-CM

## 2020-04-08 DIAGNOSIS — F32.A DEPRESSION, UNSPECIFIED DEPRESSION TYPE: ICD-10-CM

## 2020-04-08 RX ORDER — NORTRIPTYLINE HYDROCHLORIDE 50 MG/1
CAPSULE ORAL
Qty: 30 CAPSULE | Refills: 2 | Status: SHIPPED | OUTPATIENT
Start: 2020-04-08 | End: 2020-07-07

## 2020-04-08 RX ORDER — GABAPENTIN 100 MG/1
CAPSULE ORAL
Qty: 120 CAPSULE | Refills: 1 | Status: SHIPPED | OUTPATIENT
Start: 2020-04-08 | End: 2020-06-08

## 2020-04-08 RX ORDER — DULOXETIN HYDROCHLORIDE 60 MG/1
CAPSULE, DELAYED RELEASE ORAL
Qty: 30 CAPSULE | Refills: 0 | Status: SHIPPED | OUTPATIENT
Start: 2020-04-08 | End: 2020-05-08

## 2020-04-27 ENCOUNTER — PATIENT MESSAGE (OUTPATIENT)
Dept: ORTHOPEDICS | Facility: CLINIC | Age: 60
End: 2020-04-27

## 2020-05-06 ENCOUNTER — PATIENT OUTREACH (OUTPATIENT)
Dept: ADMINISTRATIVE | Facility: OTHER | Age: 60
End: 2020-05-06

## 2020-05-08 ENCOUNTER — TELEPHONE (OUTPATIENT)
Dept: ORTHOPEDICS | Facility: CLINIC | Age: 60
End: 2020-05-08

## 2020-05-08 ENCOUNTER — OFFICE VISIT (OUTPATIENT)
Dept: ORTHOPEDICS | Facility: CLINIC | Age: 60
End: 2020-05-08
Payer: COMMERCIAL

## 2020-05-08 ENCOUNTER — OFFICE VISIT (OUTPATIENT)
Dept: ENDOCRINOLOGY | Facility: CLINIC | Age: 60
End: 2020-05-08
Payer: COMMERCIAL

## 2020-05-08 ENCOUNTER — PATIENT MESSAGE (OUTPATIENT)
Dept: ORTHOPEDICS | Facility: CLINIC | Age: 60
End: 2020-05-08

## 2020-05-08 DIAGNOSIS — G89.4 CHRONIC PAIN SYNDROME: ICD-10-CM

## 2020-05-08 DIAGNOSIS — F32.A DEPRESSION, UNSPECIFIED DEPRESSION TYPE: ICD-10-CM

## 2020-05-08 DIAGNOSIS — Z20.6 EXPOSURE TO HIV: ICD-10-CM

## 2020-05-08 DIAGNOSIS — M75.81 RIGHT ROTATOR CUFF TENDINITIS: Primary | ICD-10-CM

## 2020-05-08 PROCEDURE — 99213 PR OFFICE/OUTPT VISIT, EST, LEVL III, 20-29 MIN: ICD-10-PCS | Mod: 95,,, | Performed by: ORTHOPAEDIC SURGERY

## 2020-05-08 PROCEDURE — 99213 OFFICE O/P EST LOW 20 MIN: CPT | Mod: 95,,, | Performed by: NURSE PRACTITIONER

## 2020-05-08 PROCEDURE — 99213 PR OFFICE/OUTPT VISIT, EST, LEVL III, 20-29 MIN: ICD-10-PCS | Mod: 95,,, | Performed by: NURSE PRACTITIONER

## 2020-05-08 PROCEDURE — 3052F PR MOST RECENT HEMOGLOBIN A1C LEVEL 8.0 - < 9.0%: ICD-10-PCS | Mod: CPTII,,, | Performed by: NURSE PRACTITIONER

## 2020-05-08 PROCEDURE — 99213 OFFICE O/P EST LOW 20 MIN: CPT | Mod: 95,,, | Performed by: ORTHOPAEDIC SURGERY

## 2020-05-08 PROCEDURE — 3052F HG A1C>EQUAL 8.0%<EQUAL 9.0%: CPT | Mod: CPTII,,, | Performed by: NURSE PRACTITIONER

## 2020-05-08 RX ORDER — DULOXETIN HYDROCHLORIDE 60 MG/1
CAPSULE, DELAYED RELEASE ORAL
Qty: 30 CAPSULE | Refills: 0 | Status: SHIPPED | OUTPATIENT
Start: 2020-05-08 | End: 2020-06-08

## 2020-05-08 NOTE — PROGRESS NOTES
The patient location is: home  The chief complaint leading to consultation is: type 2 diabetes  Visit type: audiovisual  Total time spent with patient: 20 minutes  Each patient to whom he or she provides medical services by telemedicine is:  (1) informed of the relationship between the physician and patient and the respective role of any other health care provider with respect to management of the patient; and (2) notified that he or she may decline to receive medical services by telemedicine and may withdraw from such care at any time.    CC: This 60 y.o. White male  is here for evaluation of  T2DM along with comorbidities indicated in the Visit Diagnosis section of this encounter.    HPI: Cortes Schroeder was diagnosed with T2DM in 1995. No medications started until 1999 under Dr. Guerrero's care. He was on an Ookala insulin pump under Dr. Marx's care for several years until ~ 2015 but stopped it bc of the cost of insulin pump supplies. Prefers Omnipod over tubed insulin pump bc he got tubing tangled often.     Pt was previously followed by Dr. Mills but transferred to [facility] when she no longer took his insurance.           Prior visit 1/2020  A1c is down from 11.7 to 10%. He received a functional PDM just a month and a half ago. He notes BGs have been better. He has all the supplies he needs for his pump finally.   Patient continues to forget to bolus ac especially in the evenings. He tends to bolus for 45 grams of carbs before meals but sometimes eats more than this. He forgot to bolus for food on [place] [employer] and BG dmitry to 477.   Cannot use the meal reminder alerts on his Omnipod PDM because he cannot hear the alarm. He is willing to set alarms on his phone.   Plan Recommend that you set alarms on your phone at 6 am, 11 am, and 6 pm.   The biggest barrier remains remembering to bolus insulin prior to meals. This is why your blood sugars rise to 200-400s.   No changes to insulin pump settings. Work on  bolus adherence and testing glucose.   Test glucose 4x/day with armani or fingersticks.   Return to clinic in 3 months with labs prior.       Interval history  a1c is down from 10 to 8.8%. He attributes improved BGs to having adequate insulin pump supplies and insulin. He gets his supplies from [facility].   C/o high FBGs despite counting his carbs and bolusing ac.       HOSPITALIZED FOR DIABETES OR RELATED COMPLICATIONS -  Yes -   DKA presumably r/t flu - Feb 2018   DKA 11/2016  DKA 2/2015    PRESCRIBED DIABETES MEDICATIONS: metformin 1000 mg bid, Humalog in Omnipod pump     Misses medication doses -     Tends to bolus for set meals under 15, 30, 45, 60 grams carbs. He does feel comfortable with carb counting.     Basal rate  12 AM - 2.8 u/hr  5 am -  2.4 u/hr  ICR 3, ISF 15, goal 120, active insulin time 3 hours     Changes pod every 1.5 days.     DM COMPLICATIONS: nephropathy and peripheral neuropathy    SIGNIFICANT DIABETES MED HISTORY:   DKA on Invokana in 2/2015   Switched from Novolog 70/30 to toujeo/novolog ~ January 2018   januvia stopped late 2018; it was ineffective   Does not find Fiasp or Novolog  effective.     SELF MONITORING BLOOD GLUCOSE: Checks blood glucose at home - at least 4x/day with Amrani. See [employer] for [employer]ani report.   CGM interpretation: Glucoses fluctuate during the day with many highs especially in the afternoon. Glucoses overnight rise 9 out of 14 nights.     HYPOGLYCEMIC EPISODES: one hypoglycemic episode of 64 noted on CGM report that followed lunch.      CURRENT DIET: eats 2 meals/day. Rarely eats 3 meals/day. Usually eats dinner every night.      CURRENT EXERCISE: none d/t back pain     SOCIAL: his friend who has DM lives with him.       There were no vitals taken for this visit.      ROS:         PHYSICAL EXAM:  GENERAL: Well developed, well nourished. No acute distress.   PSYCH: AAOx3, appropriate mood and affect, conversant, well-groomed. Judgement and insight good.   NEURO: Speech clear,  no tremor.         Hemoglobin A1C   Date Value Ref Range Status   03/26/2020 8.8 (H) 4.0 - 5.6 % Final     Comment:     ADA Screening Guidelines:  5.7-6.4%  Consistent with prediabetes  >or=6.5%  Consistent with diabetes  High levels of fetal hemoglobin interfere with the HbA1C  assay. Heterozygous hemoglobin variants (HbS, HgC, etc)do  not significantly interfere with this assay.   However, presence of multiple variants may affect accuracy.     12/28/2019 10.0 (H) 4.0 - 5.6 % Final     Comment:     ADA Screening Guidelines:  5.7-6.4%  Consistent with prediabetes  >or=6.5%  Consistent with diabetes  High levels of fetal hemoglobin interfere with the HbA1C  assay. Heterozygous hemoglobin variants (HbS, HgC, etc)do  not significantly interfere with this assay.   However, presence of multiple variants may affect accuracy.     07/27/2019 11.7 (H) 4.0 - 5.6 % Final     Comment:     ADA Screening Guidelines:  5.7-6.4%  Consistent with prediabetes  >or=6.5%  Consistent with diabetes  High levels of fetal hemoglobin interfere with the HbA1C  assay. Heterozygous hemoglobin variants (HbS, HgC, etc)do  not significantly interfere with this assay.   However, presence of multiple variants may affect accuracy.             Chemistry        Component Value Date/Time     12/28/2019 0919    K 5.0 12/28/2019 0919     12/28/2019 0919    CO2 27 12/28/2019 0919    BUN 16 12/28/2019 0919    CREATININE 1.3 12/28/2019 0919     (H) 12/28/2019 0919        Component Value Date/Time    CALCIUM 9.3 12/28/2019 0919    ALKPHOS 117 12/28/2019 0919    AST 23 12/28/2019 0919    ALT 38 12/28/2019 0919    BILITOT 0.3 12/28/2019 0919    ESTGFRAFRICA >60.0 12/28/2019 0919    EGFRNONAA >60.0 12/28/2019 0919          Lab Results   Component Value Date    LDLCALC 47.4 (L) 12/28/2019        Ref. Range 12/28/2019 09:19   Cholesterol Latest Ref Range: 120 - 199 mg/dL 119 (L)   HDL Latest Ref Range: 40 - 75 mg/dL 37 (L)   Hdl/Cholesterol  Ratio Latest Ref Range: 20.0 - 50.0 % 31.1   LDL Cholesterol External Latest Ref Range: 63.0 - 159.0 mg/dL 47.4 (L)   Non-HDL Cholesterol Latest Units: mg/dL 82   Total Cholesterol/HDL Ratio Latest Ref Range: 2.0 - 5.0  3.2   Triglycerides Latest Ref Range: 30 - 150 mg/dL 173 (H)       Lab Results   Component Value Date    MICALBCREAT 98.9 (H) 12/28/2019           STANDARDS of CARE:        ASA:               Last eye exam: 4/2018      ASSESSMENT and PLAN:    A1C GOAL: < 7 %     1. Type 2 diabetes mellitus, uncontrolled, with retinopathy  Afternoon high glucoses related to mismatch between insulin and carb intake at lunch. Suspect that he is undercounting carbs but perhaps he needs more aggressive carb ratio. Advised pt to bring a week long food diary to next visit.   Increase basal rate and prolong it from 10 pm to 6 am by 20% at 3.3 u/hr.   12 am - 3.3 u/hr  6 am - 2.4   10 pm - 3.3     Test glucose at least 4x/day. rtc in 3 mo with labs prior.           Patient must change pods every 1.5 days.     No orders of the defined types were placed in this encounter.       No follow-ups on file.

## 2020-05-08 NOTE — PATIENT INSTRUCTIONS
Afternoon high glucoses related to mismatch between insulin and carb intake at lunch. Suspect that he is undercounting carbs but perhaps he needs more aggressive carb ratio. Advised pt to bring a week long food diary to next visit.   Increase basal rate and prolong it from 10 pm to 6 am by 20% at 3.3 u/hr.   12 am - 3.3 u/hr  6 am - 2.4   10 pm - 3.3     Test glucose at least 4x/day. rtc in 3 mo with labs prior.

## 2020-05-08 NOTE — TELEPHONE ENCOUNTER
----- Message from Kassandra Michele MD sent at 5/8/2020 10:48 AM CDT -----  He also needs an MRI scheduled with a follow up visit to review (in person or video ok).   He would prefer to go to the [facility] MRI to avoid going in the hospital

## 2020-05-08 NOTE — PROGRESS NOTES
"The patient location is: home   The chief complaint leading to consultation is: right shoulder pain follow up   Visit type: audiovisual  Total time spent with patient: 12 minutes   Each patient to whom he or she provides medical services by telemedicine is:  (1) informed of the relationship between the physician and patient and the respective role of any other health care provider with respect to management of the patient; and (2) notified that he or she may decline to receive medical services by telemedicine and may withdraw from such care at any time.    Notes:   R shoulder pain   Injection at last visit - states that this was only effective for about 2 days  PT referral - did not attend because of his work schedule. He has not had much luck with PT for other issues in the past   Continues to have pain that limits ADLs and wakes him up at night   Getting some relief with Aleve OTC - taking at night   Now has pain on the left as well  Describes pain as "like a knife" - radiates to elbow not to hand  No N/T in hands     Review of Systems   Unremarkable, no changes since last visit.     No change in medical, surgical, family or surgical history except as stated above     Review of patient's allergies indicates:   Allergen Reactions    Invokana [canagliflozin] Anaphylaxis    Percocet [oxycodone-acetaminophen] Nausea Only and Hallucinations    Biaxin [clarithromycin]     Hydrocodone Other (See Comments)     Dizzy/nausea/hallucinations    Sulfa (sulfonamide antibiotics) Nausea Only and Rash         Exam:   NAD  No vitals   R shoulder:  FE to 90 with pain while bringing arm down to his side   Localizes pain to subdeltoid fossa   Unable to assess NV exam or strength of cuff     No imaging     A/P: 60 y.o. male with R rotator cuff tendinitis v tear   - Do not recommend further injections - poor response to previous  - Cont Aleve as needed, can switch to Rx med if he would like in the future. He is currently happy with " Aleve  - MRI R shoulder - can be done at [facility]   - Will schedule visit for results. Can be video or in person depending on patient preference

## 2020-05-11 RX ORDER — EMTRICITABINE AND TENOFOVIR DISOPROXIL FUMARATE 200; 300 MG/1; MG/1
TABLET, FILM COATED ORAL
Qty: 30 TABLET | Refills: 2 | Status: SHIPPED | OUTPATIENT
Start: 2020-05-11 | End: 2020-08-06

## 2020-05-29 ENCOUNTER — TELEPHONE (OUTPATIENT)
Dept: ENDOCRINOLOGY | Facility: CLINIC | Age: 60
End: 2020-05-29

## 2020-05-29 NOTE — TELEPHONE ENCOUNTER
Tried contacting the pt to schedule his 3 month f/u appt for August with labs the week prior. The pt's phone ring and then stated the call could not be completed as dialed.

## 2020-06-08 RX ORDER — INSULIN LISPRO 100 [IU]/ML
INJECTION, SOLUTION INTRAVENOUS; SUBCUTANEOUS
Qty: 50 ML | Refills: 5 | Status: SHIPPED | OUTPATIENT
Start: 2020-06-08 | End: 2021-04-05 | Stop reason: SDUPTHER

## 2020-06-17 ENCOUNTER — HOSPITAL ENCOUNTER (OUTPATIENT)
Dept: RADIOLOGY | Facility: HOSPITAL | Age: 60
Discharge: HOME | End: 2020-06-17
Attending: UROLOGY
Payer: COMMERCIAL

## 2020-06-17 DIAGNOSIS — N20.0 KIDNEY STONES: ICD-10-CM

## 2020-06-17 PROCEDURE — 74018 XR ABDOMEN AP 1 VIEW: ICD-10-PCS | Mod: 26,,, | Performed by: RADIOLOGY

## 2020-06-17 PROCEDURE — 74018 RADEX ABDOMEN 1 VIEW: CPT | Mod: 26,,, | Performed by: RADIOLOGY

## 2020-06-17 PROCEDURE — 74018 RADEX ABDOMEN 1 VIEW: CPT | Mod: TC,FY

## 2020-06-22 ENCOUNTER — PATIENT OUTREACH (OUTPATIENT)
Dept: ADMINISTRATIVE | Facility: OTHER | Age: 60
End: 2020-06-22

## 2020-06-23 ENCOUNTER — OFFICE VISIT (OUTPATIENT)
Dept: UROLOGY | Facility: CLINIC | Age: 60
End: 2020-06-23
Payer: COMMERCIAL

## 2020-06-23 VITALS
DIASTOLIC BLOOD PRESSURE: 78 MMHG | HEIGHT: 65 IN | WEIGHT: 218.69 LBS | BODY MASS INDEX: 36.44 KG/M2 | SYSTOLIC BLOOD PRESSURE: 122 MMHG

## 2020-06-23 DIAGNOSIS — N52.9 ED (ERECTILE DYSFUNCTION) OF ORGANIC ORIGIN: ICD-10-CM

## 2020-06-23 DIAGNOSIS — N40.0 BPH WITHOUT OBSTRUCTION/LOWER URINARY TRACT SYMPTOMS: Primary | ICD-10-CM

## 2020-06-23 DIAGNOSIS — N20.0 KIDNEY STONES: ICD-10-CM

## 2020-06-23 DIAGNOSIS — K86.89 PANCREATIC STONES: ICD-10-CM

## 2020-06-23 LAB
BILIRUB SERPL-MCNC: NORMAL MG/DL
BLOOD URINE, POC: NORMAL
COLOR, POC UA: YELLOW
GLUCOSE UR QL STRIP: POSITIVE
KETONES UR QL STRIP: NORMAL
LEUKOCYTE ESTERASE URINE, POC: NORMAL
NITRITE, POC UA: NORMAL
PH, POC UA: 5
PROTEIN, POC: NORMAL
SPECIFIC GRAVITY, POC UA: 1025
UROBILINOGEN, POC UA: NORMAL

## 2020-06-23 PROCEDURE — 99999 PR PBB SHADOW E&M-EST. PATIENT-LVL IV: ICD-10-PCS | Mod: PBBFAC,,, | Performed by: UROLOGY

## 2020-06-23 PROCEDURE — 81001 POCT URINALYSIS, DIPSTICK OR TABLET REAGENT, AUTOMATED, WITH MICROSCOP: ICD-10-PCS | Mod: S$GLB,,, | Performed by: UROLOGY

## 2020-06-23 PROCEDURE — 3078F PR MOST RECENT DIASTOLIC BLOOD PRESSURE < 80 MM HG: ICD-10-PCS | Mod: CPTII,S$GLB,, | Performed by: UROLOGY

## 2020-06-23 PROCEDURE — 3008F BODY MASS INDEX DOCD: CPT | Mod: CPTII,S$GLB,, | Performed by: UROLOGY

## 2020-06-23 PROCEDURE — 81001 URINALYSIS AUTO W/SCOPE: CPT | Mod: S$GLB,,, | Performed by: UROLOGY

## 2020-06-23 PROCEDURE — 99999 PR PBB SHADOW E&M-EST. PATIENT-LVL IV: CPT | Mod: PBBFAC,,, | Performed by: UROLOGY

## 2020-06-23 PROCEDURE — 99214 PR OFFICE/OUTPT VISIT, EST, LEVL IV, 30-39 MIN: ICD-10-PCS | Mod: 25,S$GLB,, | Performed by: UROLOGY

## 2020-06-23 PROCEDURE — 99214 OFFICE O/P EST MOD 30 MIN: CPT | Mod: 25,S$GLB,, | Performed by: UROLOGY

## 2020-06-23 PROCEDURE — 3074F PR MOST RECENT SYSTOLIC BLOOD PRESSURE < 130 MM HG: ICD-10-PCS | Mod: CPTII,S$GLB,, | Performed by: UROLOGY

## 2020-06-23 PROCEDURE — 3078F DIAST BP <80 MM HG: CPT | Mod: CPTII,S$GLB,, | Performed by: UROLOGY

## 2020-06-23 PROCEDURE — 3008F PR BODY MASS INDEX (BMI) DOCUMENTED: ICD-10-PCS | Mod: CPTII,S$GLB,, | Performed by: UROLOGY

## 2020-06-23 PROCEDURE — 3074F SYST BP LT 130 MM HG: CPT | Mod: CPTII,S$GLB,, | Performed by: UROLOGY

## 2020-06-23 NOTE — PROGRESS NOTES
Subjective:       Patient ID: Cortes Schroeder is a 60 y.o. male who was last seen in this office Visit date not found    Chief Complaint:   Chief Complaint   Patient presents with    Follow-up     6 month follow up with x-ray       History of Kidney Stones  He has never required a procedure.  He passed a stone in 2003. He feels fine, no hematuria or dysuria or flank pain.  He is back with a KUB.       Erectile Dysfunction  Patient complains of erectile dysfunction. Onset of dysfunction was several years ago and was gradual in onset.  Patient states the nature of difficulty is both attaining and maintaining erection. Full erections occur never. Partial erections occur never. Libido is not affected. Risk factors for ED include diabetes mellitus. Patient denies history of pelvic radiation. Previous treatment of ED includes Viagra and Cialis and Trimix.  His  passed away in May 2017, he is not interested in medications at present.  He is dating again but is having some libido issues.    Hypogonadism  He is here today to discuss hypogonadism.  His symptoms include: Poor energy, Fatigue, Poor libido, Erectile Dysfunction, Increased Body Fat and Gynecomastia.   Onset of symptoms was several years ago and was gradual in onset. He is bothered by these symptoms.  Risk factors include: Diabetes and Hypertension.  Previous treatments include Androgel.    Benign Prostatic Hyperplasia  He patient reports frequency and nocturia two times a night. He denies straining, urgency and weak stream. The patient states symptoms are of mild severity. Onset of symptoms was several years ago and was gradual in onset.  He has no personal history and no family history of prostate cancer. He reports a history of kidney stones. He denies flank pain, gross hematuria and recurrent UTI.  He is currently taking no prostate medications    ACTIVE MEDICAL ISSUES:  Patient Active Problem List   Diagnosis    Hyperlipidemia    Anxiety     Chronic bilateral low back pain without sciatica    Neuropathy    Uncontrolled type 2 diabetes mellitus with diabetic polyneuropathy, with long-term current use of insulin    NPDR (nonproliferative diabetic retinopathy)    Insomnia    Gastroesophageal reflux disease without esophagitis    Hypertension, well controlled    Chronic pain syndrome    Diabetic nephropathy associated with type 2 diabetes mellitus    Right sided weakness    Cervical syndrome    Chronic neck pain    Muscle weakness    Impaired motor control    Decreased range of motion (ROM) of shoulder    Cervical spondylosis without myelopathy    Neuroforaminal stenosis of cervical spine    Right cervical radiculopathy    Cervical radiculopathy    DDD (degenerative disc disease), lumbar       ALLERGIES AND MEDICATIONS: updated and reviewed.  Review of patient's allergies indicates:   Allergen Reactions    Invokana [canagliflozin] Anaphylaxis    Percocet [oxycodone-acetaminophen] Nausea Only and Hallucinations    Biaxin [clarithromycin]     Hydrocodone Other (See Comments)     Dizzy/nausea/hallucinations    Sulfa (sulfonamide antibiotics) Nausea Only and Rash     Current Outpatient Medications   Medication Sig    cyanocobalamin (VITAMIN B-12) 100 MCG tablet Take 100 mcg by mouth once daily.    diabetic supplies, miscellan. Kit Please change sensor every 14 days. FreeStyle Armani Sensor 30-day supply (2 sensors/month)    DULoxetine (CYMBALTA) 60 MG capsule TAKE ONE CAPSULE BY MOUTH DAILY    fish oil-omega-3 fatty acids 300-1,000 mg capsule Take by mouth 2 (two) times daily.    FREESTYLE ARMANI 14 DAY SENSOR Kit Change sensors every 14 days.    gabapentin (NEURONTIN) 100 MG capsule TAKE 1 CAPSULE BY MOUTH EVERY MORNING THEN TAKE 3 CAPSULES BY MOUTH EVERY NIGHT AT BEDTIME    HUMALOG U-100 INSULIN 100 unit/mL injection Use in insulin pump; max dose 150 units per day.    insulin pump controller (OMNIPOD DASH PDM KIT MISC) by  "Misc.(Non-Drug; Combo Route) route.    losartan (COZAAR) 50 MG tablet TAKE 1 TABLET(50 MG) BY MOUTH EVERY DAY    magnesium oxide-Mg AA chelate (MG-PLUS-PROTEIN) 133 mg Tab Take 500 mg by mouth every evening.     metFORMIN (GLUCOPHAGE) 1000 MG tablet TAKE 1 TABLET(1000 MG) BY MOUTH TWICE DAILY WITH MEALS    nortriptyline (PAMELOR) 50 MG capsule TAKE 1 CAPSULE(50 MG) BY MOUTH EVERY NIGHT    pravastatin (PRAVACHOL) 80 MG tablet TAKE 1 TABLET(80 MG) BY MOUTH EVERY DAY    promethazine-dextromethorphan (PROMETHAZINE-DM) 6.25-15 mg/5 mL Syrp Take 5ml qhs prn cough    PROPYLENE GLYCOL//PF (SYSTANE, PF, OPHT) Apply to eye 4 (four) times daily.    TRUVADA 200-300 mg Tab TAKE 1 TABLET BY MOUTH EVERY DAY    vitamin D 1000 units Tab Take 185 mg by mouth 2 (two) times daily.     aspirin (ECOTRIN) 81 MG EC tablet Take 1 tablet (81 mg total) by mouth once daily.     No current facility-administered medications for this visit.        Review of Systems   Constitutional: Negative for activity change, fatigue, fever and unexpected weight change.   HENT: Negative for congestion.    Eyes: Negative for redness.   Respiratory: Negative for chest tightness and shortness of breath.    Cardiovascular: Negative for chest pain and leg swelling.   Gastrointestinal: Negative for abdominal pain, constipation, diarrhea, nausea and vomiting.   Genitourinary: Negative for dysuria, flank pain, frequency, hematuria, penile pain, penile swelling, scrotal swelling, testicular pain and urgency.   Musculoskeletal: Negative for arthralgias and back pain.   Neurological: Negative for dizziness and light-headedness.   Psychiatric/Behavioral: Negative for behavioral problems and confusion. The patient is not nervous/anxious.    All other systems reviewed and are negative.      Objective:      Vitals:    06/23/20 1424   BP: 122/78   Weight: 99.2 kg (218 lb 11.1 oz)   Height: 5' 5" (1.651 m)     Physical Exam   Nursing note and vitals " reviewed.  Constitutional: He is oriented to person, place, and time. He appears well-developed.   HENT:   Head: Normocephalic.   Eyes: Conjunctivae are normal.   Neck: Normal range of motion. No tracheal deviation present. No thyromegaly present.   Cardiovascular: Normal rate and normal heart sounds.    Pulmonary/Chest: Effort normal and breath sounds normal. No respiratory distress. He has no wheezes.   Abdominal: Soft. He exhibits no distension and no mass. There is no abdominal tenderness. There is no rebound and no guarding. No hernia. Hernia confirmed negative in the right inguinal area and confirmed negative in the left inguinal area.   Genitourinary:    Testes, penis and rectum normal.   Rectum:      No rectal mass, tenderness or external hemorrhoid.   Prostate is enlarged. Prostate is not tender. Right testis shows no mass and no tenderness. Left testis shows no mass and no tenderness.       Musculoskeletal: No tenderness.   Lymphadenopathy: No inguinal adenopathy noted on the right or left side.   Neurological: He is alert and oriented to person, place, and time.   Skin: Skin is warm and dry. No rash noted. No erythema.     Psychiatric: His behavior is normal. Judgment and thought content normal.       Urine dipstick shows negative for all components.  Micro exam: negative for WBC's or RBC's.    Assessment:       1. BPH without obstruction/lower urinary tract symptoms    2. ED (erectile dysfunction) of organic origin    3. Pancreatic stones    4. Kidney stones          Plan:       1. BPH without obstruction/lower urinary tract symptoms    - POCT urinalysis, dipstick or tablet reag  - Prostate Specific Antigen, Diagnostic; Future    2. ED (erectile dysfunction) of organic origin  Declined treatment    3. Pancreatic stones    - Ambulatory referral/consult to Gastroenterology; Future    4. Kidney stones  None currently            Follow up in about 6 months (around 12/23/2020) for Review PSA, Follow up.

## 2020-06-25 ENCOUNTER — PATIENT MESSAGE (OUTPATIENT)
Dept: FAMILY MEDICINE | Facility: CLINIC | Age: 60
End: 2020-06-25

## 2020-06-25 DIAGNOSIS — Z20.6 EXPOSURE TO HIV: Primary | ICD-10-CM

## 2020-06-27 ENCOUNTER — LAB VISIT (OUTPATIENT)
Dept: LAB | Facility: HOSPITAL | Age: 60
End: 2020-06-27
Attending: NURSE PRACTITIONER
Payer: COMMERCIAL

## 2020-06-27 DIAGNOSIS — Z20.6 EXPOSURE TO HIV: ICD-10-CM

## 2020-06-27 DIAGNOSIS — E78.1 HYPERTRIGLYCERIDEMIA: ICD-10-CM

## 2020-06-27 LAB
ALBUMIN SERPL BCP-MCNC: 3.6 G/DL (ref 3.5–5.2)
ALP SERPL-CCNC: 120 U/L (ref 55–135)
ALT SERPL W/O P-5'-P-CCNC: 61 U/L (ref 10–44)
ANION GAP SERPL CALC-SCNC: 9 MMOL/L (ref 8–16)
AST SERPL-CCNC: 42 U/L (ref 10–40)
BILIRUB SERPL-MCNC: 0.3 MG/DL (ref 0.1–1)
BUN SERPL-MCNC: 18 MG/DL (ref 6–20)
CALCIUM SERPL-MCNC: 9.2 MG/DL (ref 8.7–10.5)
CHLORIDE SERPL-SCNC: 103 MMOL/L (ref 95–110)
CHOLEST SERPL-MCNC: 151 MG/DL (ref 120–199)
CHOLEST/HDLC SERPL: 4.4 {RATIO} (ref 2–5)
CO2 SERPL-SCNC: 24 MMOL/L (ref 23–29)
CREAT SERPL-MCNC: 1.4 MG/DL (ref 0.5–1.4)
EST. GFR  (AFRICAN AMERICAN): >60 ML/MIN/1.73 M^2
EST. GFR  (NON AFRICAN AMERICAN): 54.6 ML/MIN/1.73 M^2
ESTIMATED AVG GLUCOSE: 226 MG/DL (ref 68–131)
GLUCOSE SERPL-MCNC: 191 MG/DL (ref 70–110)
HBA1C MFR BLD HPLC: 9.5 % (ref 4–5.6)
HDLC SERPL-MCNC: 34 MG/DL (ref 40–75)
HDLC SERPL: 22.5 % (ref 20–50)
LDLC SERPL CALC-MCNC: 70.2 MG/DL (ref 63–159)
NONHDLC SERPL-MCNC: 117 MG/DL
POTASSIUM SERPL-SCNC: 4.1 MMOL/L (ref 3.5–5.1)
PROT SERPL-MCNC: 7.3 G/DL (ref 6–8.4)
SODIUM SERPL-SCNC: 136 MMOL/L (ref 136–145)
TRIGL SERPL-MCNC: 234 MG/DL (ref 30–150)

## 2020-06-27 PROCEDURE — 80053 COMPREHEN METABOLIC PANEL: CPT

## 2020-06-27 PROCEDURE — 83036 HEMOGLOBIN GLYCOSYLATED A1C: CPT

## 2020-06-27 PROCEDURE — 36415 COLL VENOUS BLD VENIPUNCTURE: CPT | Mod: PO

## 2020-06-27 PROCEDURE — 80061 LIPID PANEL: CPT

## 2020-06-27 PROCEDURE — 86703 HIV-1/HIV-2 1 RESULT ANTBDY: CPT

## 2020-06-29 ENCOUNTER — TELEPHONE (OUTPATIENT)
Dept: ENDOCRINOLOGY | Facility: CLINIC | Age: 60
End: 2020-06-29

## 2020-06-29 LAB — HIV 1+2 AB+HIV1 P24 AG SERPL QL IA: NEGATIVE

## 2020-06-29 NOTE — TELEPHONE ENCOUNTER
----- Message from Violetta Woods NP sent at 6/29/2020 12:50 PM CDT -----  A1c has increased indicating worse diabetes control. Triglycerides have worsened because of uncontrolled diabetes. Please schedule f/u appt for insulin pump visit.

## 2020-08-06 ENCOUNTER — PATIENT OUTREACH (OUTPATIENT)
Dept: ADMINISTRATIVE | Facility: OTHER | Age: 60
End: 2020-08-06

## 2020-08-06 NOTE — PROGRESS NOTES
Care Everywhere: updated  Immunization: updated (delay in links system)   Health Maintenance: updated  Media Review:   Legacy Review:   Order placed:   Upcoming appts:optometry 8/7/2020

## 2020-08-07 ENCOUNTER — OFFICE VISIT (OUTPATIENT)
Dept: OPTOMETRY | Facility: CLINIC | Age: 60
End: 2020-08-07
Payer: COMMERCIAL

## 2020-08-07 DIAGNOSIS — H25.13 NUCLEAR SCLEROSIS, BILATERAL: ICD-10-CM

## 2020-08-07 DIAGNOSIS — H52.03 HYPEROPIA WITH PRESBYOPIA, BILATERAL: ICD-10-CM

## 2020-08-07 DIAGNOSIS — E11.3293 MILD NONPROLIFERATIVE DIABETIC RETINOPATHY OF BOTH EYES WITHOUT MACULAR EDEMA ASSOCIATED WITH TYPE 2 DIABETES MELLITUS: Primary | ICD-10-CM

## 2020-08-07 DIAGNOSIS — E11.36 TYPE 2 DIABETES MELLITUS WITH CATARACT: ICD-10-CM

## 2020-08-07 DIAGNOSIS — H52.4 HYPEROPIA WITH PRESBYOPIA, BILATERAL: ICD-10-CM

## 2020-08-07 DIAGNOSIS — H04.121 DRY EYE SYNDROME, RIGHT: ICD-10-CM

## 2020-08-07 PROCEDURE — 92015 DETERMINE REFRACTIVE STATE: CPT | Mod: S$GLB,,, | Performed by: OPTOMETRIST

## 2020-08-07 PROCEDURE — 92014 PR EYE EXAM, EST PATIENT,COMPREHESV: ICD-10-PCS | Mod: S$GLB,,, | Performed by: OPTOMETRIST

## 2020-08-07 PROCEDURE — 92015 PR REFRACTION: ICD-10-PCS | Mod: S$GLB,,, | Performed by: OPTOMETRIST

## 2020-08-07 PROCEDURE — 92014 COMPRE OPH EXAM EST PT 1/>: CPT | Mod: S$GLB,,, | Performed by: OPTOMETRIST

## 2020-08-07 PROCEDURE — 99999 PR PBB SHADOW E&M-EST. PATIENT-LVL III: CPT | Mod: PBBFAC,,, | Performed by: OPTOMETRIST

## 2020-08-07 PROCEDURE — 99999 PR PBB SHADOW E&M-EST. PATIENT-LVL III: ICD-10-PCS | Mod: PBBFAC,,, | Performed by: OPTOMETRIST

## 2020-08-07 NOTE — PROGRESS NOTES
HPI     DLS: 8/9/19  Pt has two pairs of Rx glasses one for dist and  One for reading and   computer, pt states he is not seeing as clear as he used to.   No f/f  DM-  No gtts  LBS: 79  Hemoglobin A1C       Date                     Value               Ref Range             Status                06/27/2020               9.5 (H)             4.0 - 5.6 %           Final                      03/26/2020               8.8 (H)             4.0 - 5.6 %           Final                       12/28/2019               10.0 (H)            4.0 - 5.6 %           Final                  Last edited by Pollo Mercedes, OD on 8/7/2020  2:05 PM. (History)        ROS     Positive for: Endocrine (DM)    Negative for: Constitutional, Gastrointestinal, Neurological, Skin,   Genitourinary, Musculoskeletal, HENT, Cardiovascular, Respiratory,   Psychiatric, Allergic/Imm, Heme/Lymph    Last edited by Pollo Mercedes, OD on 8/7/2020  2:05 PM. (History)        Assessment /Plan     For exam results, see Encounter Report.    Mild nonproliferative diabetic retinopathy of both eyes without macular edema associated with type 2 diabetes mellitus    Nuclear sclerosis, bilateral    Hyperopia with presbyopia, bilateral    Dry eye syndrome, right    Type 2 diabetes mellitus with cataract      1. Cats OU--pt wishes new spex Rx  2. DM w Mild NPDR OU  3. ELLIS--advised SYS BAL or SOOTHE XP Ats QID/prn    PLAN:    rtc 1 yr

## 2020-09-04 ENCOUNTER — OFFICE VISIT (OUTPATIENT)
Dept: FAMILY MEDICINE | Facility: CLINIC | Age: 60
End: 2020-09-04
Payer: COMMERCIAL

## 2020-09-04 VITALS
BODY MASS INDEX: 35.81 KG/M2 | RESPIRATION RATE: 17 BRPM | HEART RATE: 107 BPM | SYSTOLIC BLOOD PRESSURE: 132 MMHG | DIASTOLIC BLOOD PRESSURE: 80 MMHG | TEMPERATURE: 97 F | WEIGHT: 214.94 LBS | OXYGEN SATURATION: 96 % | HEIGHT: 65 IN

## 2020-09-04 DIAGNOSIS — E78.5 HYPERLIPIDEMIA, UNSPECIFIED HYPERLIPIDEMIA TYPE: ICD-10-CM

## 2020-09-04 DIAGNOSIS — R05.9 COUGH: ICD-10-CM

## 2020-09-04 DIAGNOSIS — J06.9 VIRAL URI WITH COUGH: Primary | ICD-10-CM

## 2020-09-04 PROCEDURE — 3079F DIAST BP 80-89 MM HG: CPT | Mod: CPTII,S$GLB,, | Performed by: FAMILY MEDICINE

## 2020-09-04 PROCEDURE — 3008F BODY MASS INDEX DOCD: CPT | Mod: CPTII,S$GLB,, | Performed by: FAMILY MEDICINE

## 2020-09-04 PROCEDURE — 3079F PR MOST RECENT DIASTOLIC BLOOD PRESSURE 80-89 MM HG: ICD-10-PCS | Mod: CPTII,S$GLB,, | Performed by: FAMILY MEDICINE

## 2020-09-04 PROCEDURE — 3008F PR BODY MASS INDEX (BMI) DOCUMENTED: ICD-10-PCS | Mod: CPTII,S$GLB,, | Performed by: FAMILY MEDICINE

## 2020-09-04 PROCEDURE — 99214 OFFICE O/P EST MOD 30 MIN: CPT | Mod: S$GLB,,, | Performed by: FAMILY MEDICINE

## 2020-09-04 PROCEDURE — 99999 PR PBB SHADOW E&M-EST. PATIENT-LVL V: CPT | Mod: PBBFAC,,, | Performed by: FAMILY MEDICINE

## 2020-09-04 PROCEDURE — 3075F SYST BP GE 130 - 139MM HG: CPT | Mod: CPTII,S$GLB,, | Performed by: FAMILY MEDICINE

## 2020-09-04 PROCEDURE — U0003 INFECTIOUS AGENT DETECTION BY NUCLEIC ACID (DNA OR RNA); SEVERE ACUTE RESPIRATORY SYNDROME CORONAVIRUS 2 (SARS-COV-2) (CORONAVIRUS DISEASE [COVID-19]), AMPLIFIED PROBE TECHNIQUE, MAKING USE OF HIGH THROUGHPUT TECHNOLOGIES AS DESCRIBED BY CMS-2020-01-R: HCPCS

## 2020-09-04 PROCEDURE — 99214 PR OFFICE/OUTPT VISIT, EST, LEVL IV, 30-39 MIN: ICD-10-PCS | Mod: S$GLB,,, | Performed by: FAMILY MEDICINE

## 2020-09-04 PROCEDURE — 3075F PR MOST RECENT SYSTOLIC BLOOD PRESS GE 130-139MM HG: ICD-10-PCS | Mod: CPTII,S$GLB,, | Performed by: FAMILY MEDICINE

## 2020-09-04 PROCEDURE — 99999 PR PBB SHADOW E&M-EST. PATIENT-LVL V: ICD-10-PCS | Mod: PBBFAC,,, | Performed by: FAMILY MEDICINE

## 2020-09-04 RX ORDER — ROSUVASTATIN CALCIUM 10 MG/1
10 TABLET, COATED ORAL DAILY
Qty: 90 TABLET | Refills: 3 | Status: SHIPPED | OUTPATIENT
Start: 2020-09-04 | End: 2020-10-21

## 2020-09-04 NOTE — PROGRESS NOTES
Routine Office Visit    Patient Name: Cortes Schroeder    : 1960  MRN: [id number]    Subjective:  Cortes is a 60 y.o. male who presents today for:    1. Cough  Patient presenting today with cough x 7 days.  No fevers, chills, or sweats.  He has felt fatigued.  He states he has been taking honey nyquil every 12 hours and that has helped.  The cough is dry.  No known sick contacts.  He states he did have some myalgias in legs and now in lower back and right arm.      Past Medical History  Past Medical History:   Diagnosis Date    Anxiety 2012    Back pain 2012    Cataract     Chronic pain syndrome 2016    Diabetes type 2, controlled 2016    Diabetic retinopathy     DM (diabetes mellitus) 2012    DM (diabetes mellitus), type 2, uncontrolled 2013    Essential hypertension 2016    Gastroesophageal reflux disease without esophagitis 2016    Hyperlipidemia 2012    Insomnia 2014    Neuropathy 2013       Past Surgical History  Past Surgical History:   Procedure Laterality Date    BACK SURGERY      Lumbar Spine    EPIDURAL STEROID INJECTION N/A 2019    Procedure: Injection, Steroid, Epidural Cervical;  Surgeon: Andrew Sinclair Jr., MD;  Location: [facility] ENDO;  Service: Pain Management;  Laterality: N/A;  Cervical Epidural Steroid Injection C7-T1    13340    Arrive @ 1240    EPIDURAL STEROID INJECTION N/A 2019    Procedure: Injection, Steroid, Epidural;  Surgeon: Andrew Sinclair Jr., MD;  Location: [facility] ENDO;  Service: Pain Management;  Laterality: N/A;  Lumbar Epidural Steroid Injection L4-5    71815    Arrive @ 1215 (requests latest time)       Family History  Family History   Problem Relation Age of Onset    Cataracts Father     Hypertension Father     Parkinsonism Father     Alzheimer's disease Father     Migraines Mother     Hypertension Mother     Heart attack Mother     Amblyopia Neg Hx     Blindness Neg Hx      Glaucoma Neg Hx     Macular degeneration Neg Hx     Retinal detachment Neg Hx     Strabismus Neg Hx        Social History  Social History     Socioeconomic History    Marital status:      Spouse name: Not on file    Number of children: Not on file    Years of education: Not on file    Highest education level: Not on file   Occupational History    Not on file   Social Needs    Financial resource strain: Not very hard    Food insecurity     Worry: Never true     Inability: Never true    Transportation needs     Medical: No     Non-medical: No   Tobacco Use    Smoking status: Never Smoker    Smokeless tobacco: Never Used   Substance and Sexual Activity    Alcohol use: Yes     Alcohol/week: 1.0 standard drinks     Types: 1 Glasses of wine per week     Frequency: 2-4 times a month     Drinks per session: 1 or 2     Binge frequency: Less than monthly     Comment: occasionally    Drug use: No    Sexual activity: Not Currently     Partners: Male     Birth control/protection: Condom     Comment: 10/2/17    Lifestyle    Physical activity     Days per week: 2 days     Minutes per session: 30 min    Stress: Only a little   Relationships    Social connections     Talks on phone: Twice a week     Gets together: Once a week     Attends L[employer] day service: Not on file     Active member of club or organization: No     Attends meetings of clubs or organizations: More than 4 times per year     Relationship status:    Other Topics Concern    Not on file   Social History Narrative    Not on file       Current Medications  Current Outpatient Medications on File Prior to Visit   Medication Sig Dispense Refill    aspirin (ECOTRIN) 81 MG EC tablet Take 1 tablet (81 mg total) by mouth once daily.  0    cyanocobalamin (VITAMIN B-12) 100 MCG tablet Take 100 mcg by mouth once daily.      diabetic supplies, miscellan. Kit Please change sensor every 14 days. FreeStyle Armani Sensor 30-day supply (2  sensors/month) 2 kit 5    DULoxetine (CYMBALTA) 60 MG capsule TAKE 1 CAPSULE BY MOUTH DAILY 30 capsule 0    fish oil-omega-3 fatty acids 300-1,000 mg capsule Take by mouth 2 (two) times daily.      FREESTYLE KAY 14 DAY SENSOR Kit Change sensors every 14 days. 2 kit 11    gabapentin (NEURONTIN) 100 MG capsule TAKE 1 CAPSULE BY MOUTH EVERY MORNING THEN TAKE 3 CAPSULES BY MOUTH EVERY NIGHT AT BEDTIME 120 capsule 1    HUMALOG U-100 INSULIN 100 unit/mL injection Use in insulin pump; max dose 150 units per day. 50 mL 5    insulin pump controller (OMNIPOD DASH PDM KIT MISC) by Misc.(Non-Drug; Combo Route) route.      losartan (COZAAR) 50 MG tablet TAKE 1 TABLET(50 MG) BY MOUTH EVERY DAY 90 tablet 3    magnesium oxide-Mg AA chelate (MG-PLUS-PROTEIN) 133 mg Tab Take 500 mg by mouth every evening.       metFORMIN (GLUCOPHAGE) 1000 MG tablet TAKE 1 TABLET(1000 MG) BY MOUTH TWICE DAILY WITH MEALS 180 tablet 2    nortriptyline (PAMELOR) 50 MG capsule TAKE 1 CAPSULE(50 MG) BY MOUTH EVERY NIGHT 30 capsule 2    promethazine-dextromethorphan (PROMETHAZINE-DM) 6.25-15 mg/5 mL Syrp Take 5ml qhs prn cough 240 mL 3    PROPYLENE GLYCOL//PF (SYSTANE, PF, OPHT) Apply to eye 4 (four) times daily.      TRUVADA 200-300 mg Tab TAKE 1 TABLET BY MOUTH EVERY DAY 30 tablet 2    vitamin D 1000 units Tab Take 185 mg by mouth 2 (two) times daily.       [DISCONTINUED] pravastatin (PRAVACHOL) 80 MG tablet TAKE 1 TABLET(80 MG) BY MOUTH EVERY DAY 90 tablet 3     No current facility-administered medications on file prior to visit.        Allergies   Review of patient's allergies indicates:   Allergen Reactions    Invokana [canagliflozin] Anaphylaxis    Percocet [oxycodone-acetaminophen] Nausea Only and Hallucinations    Biaxin [clarithromycin]     Hydrocodone Other (See Comments)     Dizzy/nausea/hallucinations    Sulfa (sulfonamide antibiotics) Nausea Only and Rash       Review of Systems (Pertinent positives)  Review of  "Systems   Constitutional: Negative.    HENT: Negative.    Eyes: Negative.    Respiratory: Positive for cough. Negative for sputum production, shortness of breath and wheezing.    Cardiovascular: Negative.    Gastrointestinal: Negative.    Musculoskeletal: Positive for myalgias.   Skin: Negative.    Neurological: Negative.          /80   Pulse 107   Temp 97.3 °F (36.3 °C) (Temporal)   Resp 17   Ht 5' 5" (1.651 m)   Wt 97.5 kg (214 lb 15.2 oz)   SpO2 96%   BMI 35.77 kg/m²     GENERAL APPEARANCE: in no apparent distress and well developed and well nourished  HEENT: PERRL, EOMI, Sclera clear, anicteric, Oropharynx clear, no lesions, Neck supple with midline trachea  NECK: normal, supple, no adenopathy, thyroid normal in size  RESPIRATORY: appears well, vitals normal, no respiratory distress, acyanotic, normal RR, chest clear, no wheezing, crepitations, rhonchi, normal symmetric air entry  HEART: regular rate and rhythm, S1, S2 normal, no murmur, click, rub or gallop.    ABDOMEN: abdomen is soft without tenderness, no masses, no hernias, no organomegaly, no rebound, no guarding. Suprapubic tenderness absent. No CVA tenderness.  SKIN: no rashes, no wounds, no other lesions  PSYCH: Alert, oriented x 3, thought content appropriate, speech normal, pleasant and cooperative, good eye contact, well groomed,     Assessment/Plan:  Cortes Schroeder is a 60 y.o. male who presents today for :    Cortes was seen today for cough, headache and leg pain.    Diagnoses and all orders for this visit:    Viral URI with cough  -     COVID-19 Routine Screening    Hyperlipidemia, unspecified hyperlipidemia type  -     rosuvastatin (CRESTOR) 10 MG tablet; Take 1 tablet (10 mg total) by mouth once daily.    Cough  -     COVID-19 Routine Screening          1.  oovid swab obtained  2.  He is to stay home until results are back  3.  Follow up 3 months or sooner if needed  4.  Call with any concerns  5.  Changed statin due to him " stating he quit pravastatin in June due to body aches      Don Artis MD

## 2020-09-05 LAB — SARS-COV-2 RNA RESP QL NAA+PROBE: NOT DETECTED

## 2020-09-13 ENCOUNTER — PATIENT MESSAGE (OUTPATIENT)
Dept: ENDOCRINOLOGY | Facility: CLINIC | Age: 60
End: 2020-09-13

## 2020-09-14 RX ORDER — INSULIN LISPRO 100 [IU]/ML
INJECTION, SOLUTION INTRAVENOUS; SUBCUTANEOUS
Qty: 50 ML | Refills: 5 | Status: SHIPPED | OUTPATIENT
Start: 2020-09-14 | End: 2020-09-15 | Stop reason: CLARIF

## 2020-09-15 ENCOUNTER — PATIENT MESSAGE (OUTPATIENT)
Dept: ENDOCRINOLOGY | Facility: CLINIC | Age: 60
End: 2020-09-15

## 2020-09-15 ENCOUNTER — TELEPHONE (OUTPATIENT)
Dept: ENDOCRINOLOGY | Facility: CLINIC | Age: 60
End: 2020-09-15

## 2020-09-15 RX ORDER — INSULIN ASPART 100 [IU]/ML
INJECTION, SOLUTION INTRAVENOUS; SUBCUTANEOUS
Qty: 50 ML | Refills: 5 | Status: SHIPPED | OUTPATIENT
Start: 2020-09-15 | End: 2020-09-15

## 2020-09-15 RX ORDER — INSULIN ASPART 100 [IU]/ML
INJECTION, SOLUTION INTRAVENOUS; SUBCUTANEOUS
Qty: 50 ML | Refills: 5 | Status: SHIPPED | OUTPATIENT
Start: 2020-09-15 | End: 2020-09-18

## 2020-09-22 ENCOUNTER — PATIENT OUTREACH (OUTPATIENT)
Dept: ADMINISTRATIVE | Facility: OTHER | Age: 60
End: 2020-09-22

## 2020-09-22 NOTE — PROGRESS NOTES
Care Everywhere: updated   Immunization: updated  Health Maintenance: updated  Media Review:   Legacy Review:   Order placed: hemoglobin a1c   Upcoming appts

## 2020-09-23 ENCOUNTER — OFFICE VISIT (OUTPATIENT)
Dept: ENDOCRINOLOGY | Facility: CLINIC | Age: 60
End: 2020-09-23
Payer: COMMERCIAL

## 2020-09-23 VITALS
WEIGHT: 208.75 LBS | SYSTOLIC BLOOD PRESSURE: 120 MMHG | HEART RATE: 97 BPM | BODY MASS INDEX: 34.78 KG/M2 | DIASTOLIC BLOOD PRESSURE: 86 MMHG | HEIGHT: 65 IN | RESPIRATION RATE: 16 BRPM

## 2020-09-23 DIAGNOSIS — Z96.41 INSULIN PUMP IN PLACE: ICD-10-CM

## 2020-09-23 DIAGNOSIS — I10 HYPERTENSION, WELL CONTROLLED: ICD-10-CM

## 2020-09-23 DIAGNOSIS — E78.5 HYPERLIPIDEMIA, UNSPECIFIED HYPERLIPIDEMIA TYPE: ICD-10-CM

## 2020-09-23 PROCEDURE — 3008F BODY MASS INDEX DOCD: CPT | Mod: CPTII,S$GLB,, | Performed by: INTERNAL MEDICINE

## 2020-09-23 PROCEDURE — 3008F PR BODY MASS INDEX (BMI) DOCUMENTED: ICD-10-PCS | Mod: CPTII,S$GLB,, | Performed by: INTERNAL MEDICINE

## 2020-09-23 PROCEDURE — 95251 CONT GLUC MNTR ANALYSIS I&R: CPT | Mod: S$GLB,,, | Performed by: INTERNAL MEDICINE

## 2020-09-23 PROCEDURE — 99999 PR PBB SHADOW E&M-EST. PATIENT-LVL V: ICD-10-PCS | Mod: PBBFAC,,, | Performed by: INTERNAL MEDICINE

## 2020-09-23 PROCEDURE — 3079F PR MOST RECENT DIASTOLIC BLOOD PRESSURE 80-89 MM HG: ICD-10-PCS | Mod: CPTII,S$GLB,, | Performed by: INTERNAL MEDICINE

## 2020-09-23 PROCEDURE — 95251 PR GLUCOSE MONITOR, 72 HOUR, PHYS INTERP: ICD-10-PCS | Mod: S$GLB,,, | Performed by: INTERNAL MEDICINE

## 2020-09-23 PROCEDURE — 99214 OFFICE O/P EST MOD 30 MIN: CPT | Mod: 25,S$GLB,, | Performed by: INTERNAL MEDICINE

## 2020-09-23 PROCEDURE — 3046F PR MOST RECENT HEMOGLOBIN A1C LEVEL > 9.0%: ICD-10-PCS | Mod: CPTII,S$GLB,, | Performed by: INTERNAL MEDICINE

## 2020-09-23 PROCEDURE — 3079F DIAST BP 80-89 MM HG: CPT | Mod: CPTII,S$GLB,, | Performed by: INTERNAL MEDICINE

## 2020-09-23 PROCEDURE — 99214 PR OFFICE/OUTPT VISIT, EST, LEVL IV, 30-39 MIN: ICD-10-PCS | Mod: 25,S$GLB,, | Performed by: INTERNAL MEDICINE

## 2020-09-23 PROCEDURE — 3046F HEMOGLOBIN A1C LEVEL >9.0%: CPT | Mod: CPTII,S$GLB,, | Performed by: INTERNAL MEDICINE

## 2020-09-23 PROCEDURE — 3074F SYST BP LT 130 MM HG: CPT | Mod: CPTII,S$GLB,, | Performed by: INTERNAL MEDICINE

## 2020-09-23 PROCEDURE — 3074F PR MOST RECENT SYSTOLIC BLOOD PRESSURE < 130 MM HG: ICD-10-PCS | Mod: CPTII,S$GLB,, | Performed by: INTERNAL MEDICINE

## 2020-09-23 PROCEDURE — 99999 PR PBB SHADOW E&M-EST. PATIENT-LVL V: CPT | Mod: PBBFAC,,, | Performed by: INTERNAL MEDICINE

## 2020-09-23 NOTE — ASSESSMENT & PLAN NOTE
Uncontrolled with both hypoglycemia and hyperglycemia.  Today I adjusted his Omni pot settings to make insulin to carb ratio less aggressive with breakfast to prevent hypoglycemia before lunch.  I also stressed the importance of correcting low blood sugars with only 15 g of sugar then waiting 15 min in rechecking as he has mostly been over correcting causing some rebound hyperglycemia.  He has persistent hyperglycemia after dinner nightly however his insulin to carbohydrate ratio is already 1:3 and I am hesitant to adjust to 1:2 as I feel this may be too aggressive.  Instead, will have him and her 25% more carbohydrates with dinner then he is actually eating instead of adjusting insulin to carbohydrate ratio for dinner.  He will let me know if this results in any hypoglycemia.  Given that he cannot properly here the alarms indicating low insulin which causes him to have significant hyperglycemia, I think he would benefit from starting a dexcom with alarms instead of using the freestyle jared.      The patient:  - Has been seen in clinic within the last 6 months  - Has type 2 diabetes  - Performs frequent blood glucose meter (BGM) testing (?4x/day)  - Uses an insulin pump   - Requires frequent adjustments to their treatment regimen based on BGM or CGM testing results    Patient will greatly benefit from starting Dexcom G6 CGM thus paperwork sent

## 2020-09-23 NOTE — ASSESSMENT & PLAN NOTE
Omnipod settings as of 09/23/2020:  Basal rate (total basal 64.8)  12 AM - 3.3 u/hr  6 am -  2.4 u/hr  10 pm - 3.3    Bolus:  ICR   Midnight 1:4  10am  1:3   dinner  1:3 but patient instructed to enter 25% more carbs than actually consuming    ISF 1:15, goal 120, active insulin time 3 hours

## 2020-09-23 NOTE — PROGRESS NOTES
Endocrine Clinic Follow up  09/23/2020    Last seen 5/2020 by endocrine NP at [facility], first visit w/ me today.    CC:  Follow-up type 2 diabetes    HPI: Cortes Schroeder was diagnosed with T2DM in 1995. No medications started until 1999 under Dr. Guerrero's care. He was on an [place] insulin pump under Dr. Marx's care for several years until ~ 2015 but stopped it bc of the cost of insulin pump supplies. Prefers Omnipod over tubed insulin pump bc he got tubing tangled often.     Pt was previously followed at [facility] but would like to transition his care to [facility].    He has a very busy work schedule and thus irregular eating patterns.  He also frequently eats fast food however monitors his carbohydrate intake.  He is entering most carbohydrate intake in to his pump.  He has been having low blood sugars several times a week after breakfast and corrects usually with more than 15 g of carbohydrate, sometimes just eating a meal.      HOSPITALIZED FOR DIABETES OR RELATED COMPLICATIONS -  Yes -   DKA presumably r/t flu - Feb 2018   DKA 11/2016  DKA 2/2015    PRESCRIBED DIABETES MEDICATIONS: metformin 1000 mg bid, Humalog in Omnipod pump     Misses medication doses - denies missed boluses  Given high insulin requirement, he is sometimes running out of insulin in his pod and is often unable to hear the alarms thus runs out of insulin and has hyperglycemia.     He does feel comfortable with carb counting.     Basal rate (total basal 64.8)  12 AM - 3.3 u/hr  6 am -  2.4 u/hr  10 pm - 3.3    ICR 3, ISF 15, goal 120, active insulin time 3 hours           Changes pod every 1.5 days.     DM COMPLICATIONS: nephropathy and peripheral neuropathy    SIGNIFICANT DIABETES MED HISTORY:   DKA on Invokana in 2/2015   Switched from Novolog 70/30 to toujeo/novolog ~ January 2018   januvia stopped late 2018; it was ineffective   Does not find Fiasp or Novolog  effective.     SELF MONITORING BLOOD GLUCOSE: Checks blood glucose at  "home - at least 4x/day with [employer]. See Media for [employer] report.   CGM interpretation:  There is a trend of hypoglycemia after breakfast as well as hyperglycemia after dinner.  He is often going to sleep with hyperglycemia and waking up with hyperglycemia, no significant to rise in blood sugars overnight.    HYPOGLYCEMIC EPISODES:  Hypoglycemia in to 60s several times a week.  Patient reports that he gets symptoms of hypoglycemia in 40s.     CURRENT DIET: eats 1 -3 meals/day.  Often eating fast food but trying to accurately enter carbs.    CURRENT EXERCISE: none d/t back pain     SOCIAL: his friend who has DM lives with him.     /86 (BP Location: Right arm, Patient Position: Sitting, BP Method: Medium (Manual))   Pulse 97   Resp 16   Ht 5' 5" (1.651 m)   Wt 94.7 kg (208 lb 12.4 oz)   BMI 34.74 kg/m²       ROS:  No chest pain, shortness of breath, vision change.    PHYSICAL EXAM:  Constitutional:  Pleasant,  in no acute distress.   HENT:   Eyes:     No scleral icterus.   Respiratory:   Effort normal   Neurological:  normal speech  Psych:   Normal mood and affect.         Hemoglobin A1C   Date Value Ref Range Status   06/27/2020 9.5 (H) 4.0 - 5.6 % Final     Comment:     ADA Screening Guidelines:  5.7-6.4%  Consistent with prediabetes  >or=6.5%  Consistent with diabetes  High levels of fetal hemoglobin interfere with the HbA1C  assay. Heterozygous hemoglobin variants (HbS, HgC, etc)do  not significantly interfere with this assay.   However, presence of multiple variants may affect accuracy.     03/26/2020 8.8 (H) 4.0 - 5.6 % Final     Comment:     ADA Screening Guidelines:  5.7-6.4%  Consistent with prediabetes  >or=6.5%  Consistent with diabetes  High levels of fetal hemoglobin interfere with the HbA1C  assay. Heterozygous hemoglobin variants (HbS, HgC, etc)do  not significantly interfere with this assay.   However, presence of multiple variants may affect accuracy.     12/28/2019 10.0 (H) 4.0 - 5.6 % Final "     Comment:     ADA Screening Guidelines:  5.7-6.4%  Consistent with prediabetes  >or=6.5%  Consistent with diabetes  High levels of fetal hemoglobin interfere with the HbA1C  assay. Heterozygous hemoglobin variants (HbS, HgC, etc)do  not significantly interfere with this assay.   However, presence of multiple variants may affect accuracy.             Chemistry        Component Value Date/Time     06/27/2020 0815    K 4.1 06/27/2020 0815     06/27/2020 0815    CO2 24 06/27/2020 0815    BUN 18 06/27/2020 0815    CREATININE 1.4 06/27/2020 0815     (H) 06/27/2020 0815        Component Value Date/Time    CALCIUM 9.2 06/27/2020 0815    ALKPHOS 120 06/27/2020 0815    AST 42 (H) 06/27/2020 0815    ALT 61 (H) 06/27/2020 0815    BILITOT 0.3 06/27/2020 0815    ESTGFRAFRICA >60.0 06/27/2020 0815    EGFRNONAA 54.6 (A) 06/27/2020 0815          Lab Results   Component Value Date    LDLCALC 70.2 06/27/2020        Ref. Range 12/28/2019 09:19   Cholesterol Latest Ref Range: 120 - 199 mg/dL 119 (L)   HDL Latest Ref Range: 40 - 75 mg/dL 37 (L)   Hdl/Cholesterol Ratio Latest Ref Range: 20.0 - 50.0 % 31.1   LDL Cholesterol External Latest Ref Range: 63.0 - 159.0 mg/dL 47.4 (L)   Non-HDL Cholesterol Latest Units: mg/dL 82   Total Cholesterol/HDL Ratio Latest Ref Range: 2.0 - 5.0  3.2   Triglycerides Latest Ref Range: 30 - 150 mg/dL 173 (H)       Lab Results   Component Value Date    MICALBCREAT 98.9 (H) 12/28/2019       STANDARDS of CARE:        ASA:               Last eye exam: 4/2018      ASSESSMENT and PLAN:    Problem List Items Addressed This Visit        1 - High    Uncontrolled type 2 diabetes mellitus with diabetic polyneuropathy, with long-term current use of insulin     Uncontrolled with both hypoglycemia and hyperglycemia.  Today I adjusted his Omni pot settings to make insulin to carb ratio less aggressive with breakfast to prevent hypoglycemia before lunch.  I also stressed the importance of correcting  low blood sugars with only 15 g of sugar then waiting 15 min in rechecking as he has mostly been over correcting causing some rebound hyperglycemia.  He has persistent hyperglycemia after dinner nightly however his insulin to carbohydrate ratio is already 1:3 and I am hesitant to adjust to 1:2 as I feel this may be too aggressive.  Instead, will have him and her 25% more carbohydrates with dinner then he is actually eating instead of adjusting insulin to carbohydrate ratio for dinner.  He will let me know if this results in any hypoglycemia.  Given that he cannot properly here the alarms indicating low insulin which causes him to have significant hyperglycemia, I think he would benefit from starting a dexcom with alarms instead of using the freestyle jared.      The patient:  - Has been seen in clinic within the last 6 months  - Has type 2 diabetes  - Performs frequent blood glucose meter (BGM) testing (?4x/day)  - Uses an insulin pump   - Requires frequent adjustments to their treatment regimen based on BGM or CGM testing results    Patient will greatly benefit from starting Dexcom G6 CGM thus paperwork sent              2     Hypertension, well controlled     Blood pressure at goal in clinic, continue losartan            3     Hyperlipidemia     LDL at goal, continue statin            4     Insulin pump in place     Omnipod settings as of 09/23/2020:  Basal rate (total basal 64.8)  12 AM - 3.3 u/hr  6 am -  2.4 u/hr  10 pm - 3.3    Bolus:  ICR   Midnight 1:4  10am  1:3   dinner  1:3 but patient instructed to enter 25% more carbs than actually consuming    ISF 1:15, goal 120, active insulin time 3 hours              Other Visit Diagnoses     Type 2 diabetes mellitus, uncontrolled, with retinopathy    -  Primary    Relevant Orders    Hemoglobin A1C        RTC (wed am pump clinic) in 1 mo w/ same day HbA1c    Maynor Zaragoza MD

## 2020-09-23 NOTE — PATIENT INSTRUCTIONS
We adjusted your pump settings to give a little less meal insulin with breakfast.    Please increase how many carbs you enter with dinner by 25%    dont overcorrect lows, just use 15 grams of sugar (4 glucose tabs, or 4 oz of juice or soda), recheck in 15 min and if still low repeat.

## 2020-10-05 ENCOUNTER — PATIENT MESSAGE (OUTPATIENT)
Dept: ADMINISTRATIVE | Facility: HOSPITAL | Age: 60
End: 2020-10-05

## 2020-10-13 DIAGNOSIS — E11.42 DIABETIC PERIPHERAL NEUROPATHY ASSOCIATED WITH TYPE 2 DIABETES MELLITUS: ICD-10-CM

## 2020-10-13 DIAGNOSIS — G89.4 CHRONIC PAIN SYNDROME: ICD-10-CM

## 2020-10-13 DIAGNOSIS — F32.A DEPRESSION, UNSPECIFIED DEPRESSION TYPE: ICD-10-CM

## 2020-10-13 RX ORDER — GABAPENTIN 100 MG/1
100 CAPSULE ORAL NIGHTLY
Qty: 120 CAPSULE | Refills: 1 | Status: SHIPPED | OUTPATIENT
Start: 2020-10-13 | End: 2020-10-19 | Stop reason: SDUPTHER

## 2020-10-13 RX ORDER — DULOXETIN HYDROCHLORIDE 60 MG/1
60 CAPSULE, DELAYED RELEASE ORAL DAILY
Qty: 30 CAPSULE | Refills: 0 | Status: SHIPPED | OUTPATIENT
Start: 2020-10-13 | End: 2020-11-10 | Stop reason: SDUPTHER

## 2020-10-13 RX ORDER — NORTRIPTYLINE HYDROCHLORIDE 50 MG/1
CAPSULE ORAL
Qty: 30 CAPSULE | Refills: 2 | Status: SHIPPED | OUTPATIENT
Start: 2020-10-13 | End: 2021-01-27 | Stop reason: SDUPTHER

## 2020-10-16 ENCOUNTER — PATIENT MESSAGE (OUTPATIENT)
Dept: FAMILY MEDICINE | Facility: CLINIC | Age: 60
End: 2020-10-16

## 2020-10-16 DIAGNOSIS — E11.21 DIABETIC NEPHROPATHY ASSOCIATED WITH TYPE 2 DIABETES MELLITUS: Primary | ICD-10-CM

## 2020-10-16 DIAGNOSIS — E11.42 DIABETIC PERIPHERAL NEUROPATHY ASSOCIATED WITH TYPE 2 DIABETES MELLITUS: ICD-10-CM

## 2020-10-16 RX ORDER — GABAPENTIN 100 MG/1
100 CAPSULE ORAL NIGHTLY
Qty: 120 CAPSULE | Refills: 1 | Status: CANCELLED | OUTPATIENT
Start: 2020-10-16

## 2020-10-19 ENCOUNTER — PATIENT MESSAGE (OUTPATIENT)
Dept: OPTOMETRY | Facility: CLINIC | Age: 60
End: 2020-10-19

## 2020-10-19 ENCOUNTER — PATIENT OUTREACH (OUTPATIENT)
Dept: ADMINISTRATIVE | Facility: HOSPITAL | Age: 60
End: 2020-10-19

## 2020-10-19 ENCOUNTER — PATIENT MESSAGE (OUTPATIENT)
Dept: FAMILY MEDICINE | Facility: CLINIC | Age: 60
End: 2020-10-19

## 2020-10-19 RX ORDER — GABAPENTIN 100 MG/1
CAPSULE ORAL
Qty: 120 CAPSULE | Refills: 1 | Status: SHIPPED | OUTPATIENT
Start: 2020-10-19 | End: 2021-01-27 | Stop reason: SDUPTHER

## 2020-10-21 ENCOUNTER — LAB VISIT (OUTPATIENT)
Dept: LAB | Facility: HOSPITAL | Age: 60
End: 2020-10-21
Payer: COMMERCIAL

## 2020-10-21 ENCOUNTER — PATIENT MESSAGE (OUTPATIENT)
Dept: FAMILY MEDICINE | Facility: CLINIC | Age: 60
End: 2020-10-21

## 2020-10-21 ENCOUNTER — OFFICE VISIT (OUTPATIENT)
Dept: ENDOCRINOLOGY | Facility: CLINIC | Age: 60
End: 2020-10-21
Payer: COMMERCIAL

## 2020-10-21 VITALS
HEIGHT: 65 IN | SYSTOLIC BLOOD PRESSURE: 140 MMHG | RESPIRATION RATE: 16 BRPM | HEART RATE: 111 BPM | OXYGEN SATURATION: 98 % | WEIGHT: 210.31 LBS | BODY MASS INDEX: 35.04 KG/M2 | DIASTOLIC BLOOD PRESSURE: 84 MMHG

## 2020-10-21 DIAGNOSIS — E78.5 HYPERLIPIDEMIA, UNSPECIFIED HYPERLIPIDEMIA TYPE: ICD-10-CM

## 2020-10-21 DIAGNOSIS — Z96.41 INSULIN PUMP IN PLACE: ICD-10-CM

## 2020-10-21 LAB
ESTIMATED AVG GLUCOSE: 206 MG/DL (ref 68–131)
HBA1C MFR BLD HPLC: 8.8 % (ref 4–5.6)

## 2020-10-21 PROCEDURE — 3079F PR MOST RECENT DIASTOLIC BLOOD PRESSURE 80-89 MM HG: ICD-10-PCS | Mod: CPTII,S$GLB,, | Performed by: INTERNAL MEDICINE

## 2020-10-21 PROCEDURE — 3052F PR MOST RECENT HEMOGLOBIN A1C LEVEL 8.0 - < 9.0%: ICD-10-PCS | Mod: CPTII,S$GLB,, | Performed by: INTERNAL MEDICINE

## 2020-10-21 PROCEDURE — 3052F HG A1C>EQUAL 8.0%<EQUAL 9.0%: CPT | Mod: CPTII,S$GLB,, | Performed by: INTERNAL MEDICINE

## 2020-10-21 PROCEDURE — 95251 PR GLUCOSE MONITOR, 72 HOUR, PHYS INTERP: ICD-10-PCS | Mod: S$GLB,,, | Performed by: INTERNAL MEDICINE

## 2020-10-21 PROCEDURE — 99999 PR PBB SHADOW E&M-EST. PATIENT-LVL V: CPT | Mod: PBBFAC,,,

## 2020-10-21 PROCEDURE — 99999 PR PBB SHADOW E&M-EST. PATIENT-LVL V: ICD-10-PCS | Mod: PBBFAC,,,

## 2020-10-21 PROCEDURE — 3008F BODY MASS INDEX DOCD: CPT | Mod: CPTII,S$GLB,, | Performed by: INTERNAL MEDICINE

## 2020-10-21 PROCEDURE — 3077F SYST BP >= 140 MM HG: CPT | Mod: CPTII,S$GLB,, | Performed by: INTERNAL MEDICINE

## 2020-10-21 PROCEDURE — 99214 OFFICE O/P EST MOD 30 MIN: CPT | Mod: 25,S$GLB,, | Performed by: INTERNAL MEDICINE

## 2020-10-21 PROCEDURE — 95251 CONT GLUC MNTR ANALYSIS I&R: CPT | Mod: S$GLB,,, | Performed by: INTERNAL MEDICINE

## 2020-10-21 PROCEDURE — 83036 HEMOGLOBIN GLYCOSYLATED A1C: CPT

## 2020-10-21 PROCEDURE — 3008F PR BODY MASS INDEX (BMI) DOCUMENTED: ICD-10-PCS | Mod: CPTII,S$GLB,, | Performed by: INTERNAL MEDICINE

## 2020-10-21 PROCEDURE — 99214 PR OFFICE/OUTPT VISIT, EST, LEVL IV, 30-39 MIN: ICD-10-PCS | Mod: 25,S$GLB,, | Performed by: INTERNAL MEDICINE

## 2020-10-21 PROCEDURE — 3077F PR MOST RECENT SYSTOLIC BLOOD PRESSURE >= 140 MM HG: ICD-10-PCS | Mod: CPTII,S$GLB,, | Performed by: INTERNAL MEDICINE

## 2020-10-21 PROCEDURE — 3079F DIAST BP 80-89 MM HG: CPT | Mod: CPTII,S$GLB,, | Performed by: INTERNAL MEDICINE

## 2020-10-21 RX ORDER — ROSUVASTATIN CALCIUM 5 MG/1
5 TABLET, COATED ORAL DAILY
Qty: 90 TABLET | Refills: 3 | Status: SHIPPED | OUTPATIENT
Start: 2020-10-21 | End: 2021-11-03 | Stop reason: SDUPTHER

## 2020-10-21 RX ORDER — INSULIN DETEMIR 100 [IU]/ML
INJECTION, SOLUTION SUBCUTANEOUS
Qty: 3 ML | Refills: 0 | Status: SHIPPED | OUTPATIENT
Start: 2020-10-21 | End: 2021-07-21

## 2020-10-21 NOTE — PATIENT INSTRUCTIONS
Basal Rate  12A:  3.3 U/h  6A:    2.4 U/h  10P:  3.3 U/h    Carb Ratio  12A: 1:4  10A:  1:3  4P 1:2    ISF  12A: 15  7P:  25    Target: 120  IAT: 3h

## 2020-10-21 NOTE — PROGRESS NOTES
Pump Clinic Return Visit    No chief complaint on file.       HPI:Cortes Schroeder is a 60 y.o. male who was diagnosed with type 2 DM in 1995.     No medications started until 1999 under Dr. Guerrero's care. He was on an [place] insulin pump under Dr. Marx's care for several years until ~ 2015 but stopped it bc of the cost of insulin pump supplies. Prefers Omnipod over tubed insulin pump bc he got tubing tangled often.      Patient recently transition his care from [facility] to [facility]. Last seen by Dr. Zaragoza in Sep 2020.     He has a very busy work schedule and thus irregular eating patterns.  He also frequently eats fast food however monitors his carbohydrate intake.  He is entering most carbohydrate intake in to his pump.  He was having hypoglycemia after breakfast so at last visit his carb ratio for breakfast was adjusted.    Misses medication doses - denies missed boluses    He reports he has not been increasing his carb count by 25% at dinner. Noted to have to give corrections every night with sometimes hypoglycemia a nigh/early morning.        Current diabetes regimen:  Metformin 1000 mg BID     Prior diabetes meds history:  DKA on Invokana in 2/2015   Januvia stopped 2018; it was ineffective   Does not find Fiasp or Novolog effective    Pump: Omnipod  Has been on current pump since 2015    Pump Settings  Basal Rate  12A:  3.3 U/h  6A:    2.4 U/h  10P:  3.3 U/h    Carb Ratio  12A: 1:4  10A:  1:3  Dinner: 1:3 but patient instructed to enter 25% more carbs - has not been doing    ISF  12A: 15    Target: 120  IAT: 3h    TDD 87  Basal 69%; Bolus 31%  Sensor wear 68 % of the time  GMI: 7.4%  [email or web address]    Lab Results   Component Value Date    LABA1C 10.8 (H) 08/15/2016    HGBA1C 8.8 (H) 10/21/2020       Glucose Monitoring:  Meter/CGM: FreeStyle [employer]  Sensor and pump report downloaded and reviewed, see report in media tab    Pt is monitoring blood glucose readings 4 times a day.  Needs >100 strips  "per month related to fluctuations with blood glucose reading, A1c trends, and activity level.    Hypoglycemic Episodes:  Some episodes overnight after nighttime corrections  Since last visit no severe hypoglycemic episodes requiring observer intervention    DKA: Yes, last episode in 2018    Screening / DM Complications:  Neuropathy Yes  Last foot exam : 03/02/2020  Last eye exam : 08/07/2020;  no laser surgery or DR  CVD/MI: No       Social: His friend who has DM lives with him.     Lab Results   Component Value Date    TSH 2.258 10/30/2016       Diet/Exercise:  Eats 1 to 3 meals a day. Trying to cut down on fast food.  No exercise due to back pain    Review of Systems   Constitutional: Negative for unexpected weight change.   Eyes: Negative for visual disturbance.   Respiratory: Negative for shortness of breath.    Cardiovascular: Negative for chest pain.   Gastrointestinal: Negative for abdominal pain.   Musculoskeletal: Negative for arthralgias.   Skin: Negative for wound.   Allergic/Immunologic: Negative for food allergies.   Neurological: Negative for headaches.   Hematological: Does not bruise/bleed easily.       Vital Signs  BP (!) 140/84   Pulse (!) 111   Resp 16   Ht 5' 5" (1.651 m)   Wt 95.4 kg (210 lb 5.1 oz)   SpO2 98%   BMI 35.00 kg/m²     Physical Exam  Vitals signs reviewed.   Constitutional:       Appearance: He is well-developed.   HENT:      Right Ear: External ear normal.      Left Ear: External ear normal.      Nose: Nose normal.   Neck:      Thyroid: No thyromegaly.      Trachea: No tracheal deviation.   Cardiovascular:      Rate and Rhythm: Normal rate.      Heart sounds: No murmur.   Pulmonary:      Effort: Pulmonary effort is normal.      Breath sounds: Normal breath sounds.   Abdominal:      Palpations: Abdomen is soft. There is no mass.      Hernia: No hernia is present.   Skin:     Findings: No rash.   Neurological:      Mental Status: He is alert.      Cranial Nerves: No cranial " nerve deficit.      Sensory: No sensory deficit.      Coordination: Coordination normal.   Psychiatric:         Judgment: Judgment normal.       No infusion site reactions    Diabetes Management Status  Statin: No - stopped taking reports intolerance to statins (reported mental fog with Crestor, muscle aches with other ones)  ACE/ARB: Taking    Screening or Prevention Patient's value   HgA1C Testing and Control   Lab Results   Component Value Date    HGBA1C 8.8 (H) 10/21/2020        Lipid profile : 06/27/2020   LDL control Lab Results   Component Value Date    LDLCALC 70.2 06/27/2020      Nephropathy screening Lab Results   Component Value Date    MICALBCREAT 98.9 (H) 12/28/2019      Blood pressure BP Readings from Last 1 Encounters:   10/21/20 (!) 140/84      Dilated retinal exam : 08/07/2020   Foot exam : 03/02/2020       Assessment/Plan  Uncontrolled type 2 diabetes mellitus with diabetic polyneuropathy, with long-term current use of insulin  Glucose control improved with TIR going up from 42 to 53%. A1c decreased from 9.5 to 8.8%. He is no longer having hypoglycemia after breakfast with new ICR. He is however having significant hyperglycemia after dinner with need to use a nighttime correction which usually results in overnight/early morning hypoglycemia. Will adjust ICR for dinner and ISF at bedtime.    He benefits from GLP-1 given BMI 35 and insulin resistance. No history of pancreatitis or personal or family history of MEN2.     Plan  - Start Ozempic 0.25 mg SQ weekly, increase to 0.5 mg SQ weekly after 4 doses  - Continue metformin 1000 mg BID  - Continue insulin pump Omnipod with following changes:  Basal Rate  12A:  3.3 U/h  6A:    2.4 U/h  10P:  3.3 U/h    Carb Ratio  12A: 1:4  10A:  1:3  4P 1:2    ISF  12A: 15  7P:  25    Target: 120  IAT: 3h    - Up to date with eye and foot exams  - Restarted on Crestor (lower dose given intolerance to 10 mg daily)  - He has a moderately increased albuminuria but had  DKA with SGLT-2i, continue ARB, will recheck UACR and if persistely elevated can discuss increasing losartan if BP allows  - F/u in 3 months with A1c, lipids    Insulin pump in place  No issues with Omnipod    Hyperlipidemia  Will try lower dose statin given intolerance to Crestor 10 mg daily    - Start Crestor 5 mg daily  - Lipids in 3 months        FOLLOW UP  Follow up in about 3 months (around 1/21/2021).       Pump backup plan  If the insulin pump is non functional and discontinued for anticipated more than 20 hours, please give daily injections of:  Long acting insulin 64 units daily  Short acting insulin for meals according to carb ratios and sensitivity factor in the pump.    When the insulin pump is restarted, do not restart basal rates until at least 22 hours after the last long acting insulin injection. You can set a 0% temporary basal setting that will last until this time and use your pump to bolus for meals and correction.    For any technical insulin pump issues, please contact the insulin pump company; the toll free number is printed on the label on the back of the insulin pump.    Stew Hoyt MD  Endocrinology Fellow

## 2020-10-21 NOTE — ASSESSMENT & PLAN NOTE
Glucose control improved with TIR going up from 42 to 53%. A1c decreased from 9.5 to 8.8%. He is no longer having hypoglycemia after breakfast with new ICR. He is however having significant hyperglycemia after dinner with need to use a nighttime correction which usually results in overnight/early morning hypoglycemia. Will adjust ICR for dinner and ISF at bedtime.    He benefits from GLP-1 given BMI 35 and insulin resistance. No history of pancreatitis or personal or family history of MEN2.     Plan  - Start Ozempic 0.25 mg SQ weekly, increase to 0.5 mg SQ weekly after 4 doses  - Continue metformin 1000 mg BID  - Continue insulin pump Omnipod with following changes:  Basal Rate  12A:  3.3 U/h  6A:    2.4 U/h  10P:  3.3 U/h    Carb Ratio  12A: 1:4  10A:  1:3  4P 1:2    ISF  12A: 15  7P:  25    Target: 120  IAT: 3h    - Up to date with eye and foot exams  - Restarted on Crestor (lower dose given intolerance to 10 mg daily)  - He has a moderately increased albuminuria but had DKA with SGLT-2i, continue ARB, will recheck UACR and if persistely elevated can discuss increasing losartan if BP allows  - F/u in 3 months with A1c, lipids

## 2020-10-21 NOTE — PROGRESS NOTES
I have reviewed and concur with Dr. Stew Bennett's history, physical, assessment, and plan.  I have personally interviewed and examined the patient.  See below addendum for my evaluation and additional findings.    Based on insulin pump and CGM data, patient seems to be having significant hyperglycemia after dinner requiring large amounts of correction insulin a couple hours later which sometimes result in hypoglycemia in the early morning.  Will make insulin to carbohydrate ratio more aggressive with the dinner and make nighttime correction less aggressive to prevent overnight hypoglycemia.  Will also start ozempic and Crestor low-dose as he did not tolerate higher doses the past.    Maynor Zaragoza MD

## 2020-10-21 NOTE — ASSESSMENT & PLAN NOTE
Will try lower dose statin given intolerance to Crestor 10 mg daily    - Start Crestor 5 mg daily  - Lipids in 3 months

## 2020-10-30 ENCOUNTER — PATIENT MESSAGE (OUTPATIENT)
Dept: ENDOCRINOLOGY | Facility: CLINIC | Age: 60
End: 2020-10-30

## 2020-11-09 ENCOUNTER — PATIENT MESSAGE (OUTPATIENT)
Dept: ENDOCRINOLOGY | Facility: CLINIC | Age: 60
End: 2020-11-09

## 2020-11-10 DIAGNOSIS — G89.4 CHRONIC PAIN SYNDROME: ICD-10-CM

## 2020-11-10 DIAGNOSIS — I10 HYPERTENSION, UNCONTROLLED: ICD-10-CM

## 2020-11-10 DIAGNOSIS — F32.A DEPRESSION, UNSPECIFIED DEPRESSION TYPE: ICD-10-CM

## 2020-11-10 RX ORDER — DULAGLUTIDE 0.75 MG/.5ML
0.75 INJECTION, SOLUTION SUBCUTANEOUS
Qty: 4 PEN | Refills: 5 | Status: SHIPPED | OUTPATIENT
Start: 2020-11-10 | End: 2020-12-10

## 2020-11-10 RX ORDER — DULOXETIN HYDROCHLORIDE 60 MG/1
60 CAPSULE, DELAYED RELEASE ORAL DAILY
Qty: 30 CAPSULE | Refills: 0 | Status: SHIPPED | OUTPATIENT
Start: 2020-11-10 | End: 2020-12-11 | Stop reason: SDUPTHER

## 2020-11-10 NOTE — TELEPHONE ENCOUNTER
ozempic not covered, try Trulicity    1. Type 2 diabetes mellitus, uncontrolled, with retinopathy  - dulaglutide (TRULICITY) 0.75 mg/0.5 mL pen injector; Inject 0.75 mg into the skin every 7 days.  Dispense: 4 pen; Refill: 5    Maynor Zaragoza MD

## 2020-11-11 RX ORDER — LOSARTAN POTASSIUM 50 MG/1
TABLET ORAL
Qty: 90 TABLET | Refills: 3 | Status: SHIPPED | OUTPATIENT
Start: 2020-11-11 | End: 2021-11-18 | Stop reason: SDUPTHER

## 2020-11-12 DIAGNOSIS — Z20.6 EXPOSURE TO HIV: ICD-10-CM

## 2020-11-12 RX ORDER — EMTRICITABINE AND TENOFOVIR DISOPROXIL FUMARATE 200; 300 MG/1; MG/1
1 TABLET, FILM COATED ORAL DAILY
Qty: 30 TABLET | Refills: 2 | Status: SHIPPED | OUTPATIENT
Start: 2020-11-12 | End: 2021-02-19 | Stop reason: SDUPTHER

## 2020-12-02 ENCOUNTER — PATIENT MESSAGE (OUTPATIENT)
Dept: FAMILY MEDICINE | Facility: CLINIC | Age: 60
End: 2020-12-02

## 2020-12-02 DIAGNOSIS — Z23 IMMUNIZATION DUE: Primary | ICD-10-CM

## 2020-12-10 ENCOUNTER — LAB VISIT (OUTPATIENT)
Dept: LAB | Facility: HOSPITAL | Age: 60
End: 2020-12-10
Attending: UROLOGY
Payer: COMMERCIAL

## 2020-12-10 ENCOUNTER — PATIENT MESSAGE (OUTPATIENT)
Dept: FAMILY MEDICINE | Facility: CLINIC | Age: 60
End: 2020-12-10

## 2020-12-10 DIAGNOSIS — N40.0 BPH WITHOUT OBSTRUCTION/LOWER URINARY TRACT SYMPTOMS: ICD-10-CM

## 2020-12-10 LAB — COMPLEXED PSA SERPL-MCNC: 0.29 NG/ML (ref 0–4)

## 2020-12-10 PROCEDURE — 36415 COLL VENOUS BLD VENIPUNCTURE: CPT

## 2020-12-10 PROCEDURE — 84153 ASSAY OF PSA TOTAL: CPT

## 2020-12-11 ENCOUNTER — CLINICAL SUPPORT (OUTPATIENT)
Dept: FAMILY MEDICINE | Facility: CLINIC | Age: 60
End: 2020-12-11
Payer: COMMERCIAL

## 2020-12-11 DIAGNOSIS — Z23 IMMUNIZATION DUE: ICD-10-CM

## 2020-12-11 DIAGNOSIS — F32.A DEPRESSION, UNSPECIFIED DEPRESSION TYPE: ICD-10-CM

## 2020-12-11 DIAGNOSIS — G89.4 CHRONIC PAIN SYNDROME: ICD-10-CM

## 2020-12-11 PROCEDURE — 90686 FLU VACCINE (QUAD) GREATER THAN OR EQUAL TO 3YO PRESERVATIVE FREE IM: ICD-10-PCS | Mod: S$GLB,,, | Performed by: FAMILY MEDICINE

## 2020-12-11 PROCEDURE — 99499 UNLISTED E&M SERVICE: CPT | Mod: S$GLB,,, | Performed by: FAMILY MEDICINE

## 2020-12-11 PROCEDURE — 90471 FLU VACCINE (QUAD) GREATER THAN OR EQUAL TO 3YO PRESERVATIVE FREE IM: ICD-10-PCS | Mod: S$GLB,,, | Performed by: FAMILY MEDICINE

## 2020-12-11 PROCEDURE — 99999 PR PBB SHADOW E&M-EST. PATIENT-LVL I: ICD-10-PCS | Mod: PBBFAC,,,

## 2020-12-11 PROCEDURE — 99999 PR PBB SHADOW E&M-EST. PATIENT-LVL I: CPT | Mod: PBBFAC,,,

## 2020-12-11 PROCEDURE — 99499 NO LOS: ICD-10-PCS | Mod: S$GLB,,, | Performed by: FAMILY MEDICINE

## 2020-12-11 PROCEDURE — 90686 IIV4 VACC NO PRSV 0.5 ML IM: CPT | Mod: S$GLB,,, | Performed by: FAMILY MEDICINE

## 2020-12-11 PROCEDURE — 90471 IMMUNIZATION ADMIN: CPT | Mod: S$GLB,,, | Performed by: FAMILY MEDICINE

## 2020-12-11 RX ORDER — DULOXETIN HYDROCHLORIDE 60 MG/1
60 CAPSULE, DELAYED RELEASE ORAL DAILY
Qty: 30 CAPSULE | Refills: 0 | Status: SHIPPED | OUTPATIENT
Start: 2020-12-11 | End: 2021-01-27 | Stop reason: SDUPTHER

## 2020-12-11 NOTE — PROGRESS NOTES
Pt name and  verified. Allergies reviewed. Administered flu vaccine to pt in right deltoid. Pt tolerated well.

## 2020-12-15 ENCOUNTER — PATIENT MESSAGE (OUTPATIENT)
Dept: UROLOGY | Facility: CLINIC | Age: 60
End: 2020-12-15

## 2020-12-15 ENCOUNTER — OFFICE VISIT (OUTPATIENT)
Dept: UROLOGY | Facility: CLINIC | Age: 60
End: 2020-12-15
Payer: COMMERCIAL

## 2020-12-15 VITALS
BODY MASS INDEX: 33.94 KG/M2 | WEIGHT: 203.69 LBS | SYSTOLIC BLOOD PRESSURE: 132 MMHG | HEIGHT: 65 IN | DIASTOLIC BLOOD PRESSURE: 78 MMHG

## 2020-12-15 DIAGNOSIS — N52.9 ED (ERECTILE DYSFUNCTION) OF ORGANIC ORIGIN: ICD-10-CM

## 2020-12-15 DIAGNOSIS — N40.0 BPH WITHOUT OBSTRUCTION/LOWER URINARY TRACT SYMPTOMS: Primary | ICD-10-CM

## 2020-12-15 DIAGNOSIS — N20.0 KIDNEY STONES: ICD-10-CM

## 2020-12-15 LAB
BILIRUB SERPL-MCNC: NORMAL MG/DL
BLOOD URINE, POC: NORMAL
COLOR, POC UA: YELLOW
GLUCOSE UR QL STRIP: 100
KETONES UR QL STRIP: NORMAL
LEUKOCYTE ESTERASE URINE, POC: NORMAL
NITRITE, POC UA: NORMAL
PH, POC UA: 5
PROTEIN, POC: NORMAL
SPECIFIC GRAVITY, POC UA: 1015
UROBILINOGEN, POC UA: NORMAL

## 2020-12-15 PROCEDURE — 3078F PR MOST RECENT DIASTOLIC BLOOD PRESSURE < 80 MM HG: ICD-10-PCS | Mod: CPTII,S$GLB,, | Performed by: UROLOGY

## 2020-12-15 PROCEDURE — 1126F AMNT PAIN NOTED NONE PRSNT: CPT | Mod: S$GLB,,, | Performed by: UROLOGY

## 2020-12-15 PROCEDURE — 99999 PR PBB SHADOW E&M-EST. PATIENT-LVL IV: ICD-10-PCS | Mod: PBBFAC,,, | Performed by: UROLOGY

## 2020-12-15 PROCEDURE — 3008F PR BODY MASS INDEX (BMI) DOCUMENTED: ICD-10-PCS | Mod: CPTII,S$GLB,, | Performed by: UROLOGY

## 2020-12-15 PROCEDURE — 3008F BODY MASS INDEX DOCD: CPT | Mod: CPTII,S$GLB,, | Performed by: UROLOGY

## 2020-12-15 PROCEDURE — 3075F PR MOST RECENT SYSTOLIC BLOOD PRESS GE 130-139MM HG: ICD-10-PCS | Mod: CPTII,S$GLB,, | Performed by: UROLOGY

## 2020-12-15 PROCEDURE — 3078F DIAST BP <80 MM HG: CPT | Mod: CPTII,S$GLB,, | Performed by: UROLOGY

## 2020-12-15 PROCEDURE — 81001 URINALYSIS AUTO W/SCOPE: CPT | Mod: S$GLB,,, | Performed by: UROLOGY

## 2020-12-15 PROCEDURE — 99213 OFFICE O/P EST LOW 20 MIN: CPT | Mod: 25,S$GLB,, | Performed by: UROLOGY

## 2020-12-15 PROCEDURE — 81001 POCT URINALYSIS, DIPSTICK OR TABLET REAGENT, AUTOMATED, WITH MICROSCOP: ICD-10-PCS | Mod: S$GLB,,, | Performed by: UROLOGY

## 2020-12-15 PROCEDURE — 3075F SYST BP GE 130 - 139MM HG: CPT | Mod: CPTII,S$GLB,, | Performed by: UROLOGY

## 2020-12-15 PROCEDURE — 99213 PR OFFICE/OUTPT VISIT, EST, LEVL III, 20-29 MIN: ICD-10-PCS | Mod: 25,S$GLB,, | Performed by: UROLOGY

## 2020-12-15 PROCEDURE — 1126F PR PAIN SEVERITY QUANTIFIED, NO PAIN PRESENT: ICD-10-PCS | Mod: S$GLB,,, | Performed by: UROLOGY

## 2020-12-15 PROCEDURE — 99999 PR PBB SHADOW E&M-EST. PATIENT-LVL IV: CPT | Mod: PBBFAC,,, | Performed by: UROLOGY

## 2020-12-15 RX ORDER — DULAGLUTIDE 0.75 MG/.5ML
INJECTION, SOLUTION SUBCUTANEOUS
COMMUNITY
Start: 2020-12-12 | End: 2021-01-20

## 2020-12-15 NOTE — PROGRESS NOTES
Subjective:       Patient ID: Cortes Schroeder is a 60 y.o. male who was last seen in this office 6/23/2020    Chief Complaint:   Chief Complaint   Patient presents with    Benign Prostatic Hypertrophy     6 month f/u with psa        History of Kidney Stones  He has never required a procedure.  He passed a stone in 2003. He feels fine, no hematuria or dysuria or flank pain.  He is back with a KUB.       Erectile Dysfunction  Patient complains of erectile dysfunction. Onset of dysfunction was several years ago and was gradual in onset.  Patient states the nature of difficulty is both attaining and maintaining erection. Full erections occur never. Partial erections occur never. Libido is not affected. Risk factors for ED include diabetes mellitus. Patient denies history of pelvic radiation. Previous treatment of ED includes Viagra and Cialis and Trimix.  His  passed away in May 2017, he is not interested in medications at present.  He is dating again but is having some libido issues.     Hypogonadism  He is here today to discuss hypogonadism.  His symptoms include: Poor energy, Fatigue, Poor libido, Erectile Dysfunction, Increased Body Fat and Gynecomastia.   Onset of symptoms was several years ago and was gradual in onset. He is bothered by these symptoms.  Risk factors include: Diabetes and Hypertension.  Previous treatments include Androgel.     Benign Prostatic Hyperplasia  He patient reports frequency and nocturia two times a night. He denies straining, urgency and weak stream. The patient states symptoms are of mild severity. Onset of symptoms was several years ago and was gradual in onset.  He has no personal history and no family history of prostate cancer. He reports a history of kidney stones. He denies flank pain, gross hematuria and recurrent UTI.  He is currently taking no prostate medications.    IPSS Questionnaire (AUA-7):  Over the past month    1)  How often have you had a sensation of not  emptying your bladder completely after you finish urinating?  4 - More than half the time   2)  How often have you had to urinate again less than two hours after you finished urinating? 2 - Less than half the time   3)  How often have you found you stopped and started again several times when you urinated?  1 - Less than 1 time in 5   4) How difficult have you found it to postpone urination?  1 - Less than 1 time in 5   5) How often have you had a weak urinary stream?  1 - Less than 1 time in 5   6) How often have you had to push or strain to begin urination?  0 - Not at all   7) How many times did you most typically get up to urinate from the time you went to bed until the time you got up in the morning?  3 - 3 times   Total score:  0-7 mildly symptomatic    8-19 moderately symptomatic    20-35 severely symptomatic        ACTIVE MEDICAL ISSUES:  Patient Active Problem List   Diagnosis    Hyperlipidemia    Anxiety    Chronic bilateral low back pain without sciatica    Neuropathy    Uncontrolled type 2 diabetes mellitus with diabetic polyneuropathy, with long-term current use of insulin    NPDR (nonproliferative diabetic retinopathy)    Insomnia    Gastroesophageal reflux disease without esophagitis    Hypertension, well controlled    Chronic pain syndrome    Diabetic nephropathy associated with type 2 diabetes mellitus    Right sided weakness    Cervical syndrome    Chronic neck pain    Muscle weakness    Impaired motor control    Decreased range of motion (ROM) of shoulder    Cervical spondylosis without myelopathy    Neuroforaminal stenosis of cervical spine    Right cervical radiculopathy    Cervical radiculopathy    DDD (degenerative disc disease), lumbar    Insulin pump in place       ALLERGIES AND MEDICATIONS: updated and reviewed.  Review of patient's allergies indicates:   Allergen Reactions    Invokana [canagliflozin] Anaphylaxis    Percocet [oxycodone-acetaminophen] Nausea Only and  Hallucinations    Biaxin [clarithromycin]     Hydrocodone Other (See Comments)     Dizzy/nausea/hallucinations    Sulfa (sulfonamide antibiotics) Nausea Only and Rash     Current Outpatient Medications   Medication Sig    cyanocobalamin (VITAMIN B-12) 100 MCG tablet Take 100 mcg by mouth once daily.    diabetic supplies, miscellan. Kit Please change sensor every 14 days. FreeStyle Armani Sensor 30-day supply (2 sensors/month)    DULoxetine (CYMBALTA) 60 MG capsule Take 1 capsule (60 mg total) by mouth once daily.    emtricitabine-tenofovir 200-300 mg (TRUVADA) 200-300 mg Tab Take 1 tablet by mouth once daily.    fish oil-omega-3 fatty acids 300-1,000 mg capsule Take by mouth 2 (two) times daily.    FREESTYLE ARMANI 14 DAY SENSOR Kit Change sensors every 14 days.    gabapentin (NEURONTIN) 100 MG capsule Take 1 capsule in am and 3 capsules in PM    HUMALOG U-100 INSULIN 100 unit/mL injection Use in insulin pump; max dose 150 units per day.    insulin pump controller (OMNIPOD DASH PDM KIT MISC) by Misc.(Non-Drug; Combo Route) route.    losartan (COZAAR) 50 MG tablet TAKE 1 TABLET(50 MG) BY MOUTH EVERY DAY    magnesium oxide-Mg AA chelate (MG-PLUS-PROTEIN) 133 mg Tab Take 500 mg by mouth every evening.     metFORMIN (GLUCOPHAGE) 1000 MG tablet TAKE 1 TABLET(1000 MG) BY MOUTH TWICE DAILY WITH MEALS    nortriptyline (PAMELOR) 50 MG capsule TAKE 1 CAPSULE(50 MG) BY MOUTH EVERY NIGHT    OPW TEST CLAIM - DO NOT FILL Inject into the skin. OPW test claim. Do not fill.    promethazine-dextromethorphan (PROMETHAZINE-DM) 6.25-15 mg/5 mL Syrp Take 5ml qhs prn cough    PROPYLENE GLYCOL//PF (SYSTANE, PF, OPHT) Apply to eye 4 (four) times daily.    rosuvastatin (CRESTOR) 5 MG tablet Take 1 tablet (5 mg total) by mouth once daily.    TRULICITY 0.75 mg/0.5 mL pen injector     vitamin D 1000 units Tab Take 185 mg by mouth 2 (two) times daily.     aspirin (ECOTRIN) 81 MG EC tablet Take 1 tablet (81 mg total)  "by mouth once daily.    insulin detemir U-100 (LEVEMIR FLEXTOUCH U-100 INSULN) 100 unit/mL (3 mL) InPn pen To use when insulin pump not working. Back up plan: Inject 65 units of Levemir once daily. (Patient not taking: Reported on 12/15/2020)     No current facility-administered medications for this visit.        Review of Systems   Constitutional: Negative for activity change, fatigue, fever and unexpected weight change.   HENT: Negative for congestion.    Eyes: Negative for redness.   Respiratory: Negative for chest tightness and shortness of breath.    Cardiovascular: Negative for chest pain and leg swelling.   Gastrointestinal: Negative for abdominal pain, constipation, diarrhea, nausea and vomiting.   Genitourinary: Negative for dysuria, flank pain, frequency, hematuria, penile pain, penile swelling, scrotal swelling, testicular pain and urgency.   Musculoskeletal: Negative for arthralgias and back pain.   Neurological: Negative for dizziness and light-headedness.   Psychiatric/Behavioral: Negative for behavioral problems and confusion. The patient is not nervous/anxious.    All other systems reviewed and are negative.      Objective:      Vitals:    12/15/20 1502   BP: 132/78   Weight: 92.4 kg (203 lb 11.3 oz)   Height: 5' 5" (1.651 m)     Physical Exam  Vitals signs and nursing note reviewed.   Constitutional:       Appearance: He is well-developed.   HENT:      Head: Normocephalic.   Eyes:      Conjunctiva/sclera: Conjunctivae normal.   Neck:      Musculoskeletal: Normal range of motion and neck supple.      Thyroid: No thyromegaly.      Trachea: No tracheal deviation.   Cardiovascular:      Rate and Rhythm: Normal rate.      Heart sounds: Normal heart sounds.   Pulmonary:      Effort: Pulmonary effort is normal. No respiratory distress.      Breath sounds: Normal breath sounds. No wheezing.   Abdominal:      General: Bowel sounds are normal.      Palpations: Abdomen is soft.      Tenderness: There is no " abdominal tenderness. There is no rebound.      Hernia: No hernia is present.   Musculoskeletal: Normal range of motion.         General: No tenderness.   Lymphadenopathy:      Cervical: No cervical adenopathy.   Skin:     General: Skin is warm and dry.      Findings: No erythema or rash.   Neurological:      Mental Status: He is alert and oriented to person, place, and time.   Psychiatric:         Behavior: Behavior normal.         Thought Content: Thought content normal.         Judgment: Judgment normal.         Urine dipstick shows negative for all components.  Micro exam: negative for WBC's or RBC's.    PSA Diagnostic 0.00 - 4.00 ng/mL 0.29    Comment: PSA Expected levels:   Hormonal Therapy: <0.05 ng/ml   Prostatectomy: <0.01 ng/ml   Radiation Therapy: <1.00 ng/ml    Resulting Agency  [facility]      Narrative  Performed by: [facility]  6 months      Specimen Collected: 12/10/20 08:45   Last Resulted: 12/10/20 17:37            Assessment:       1. BPH without obstruction/lower urinary tract symptoms    2. ED (erectile dysfunction) of organic origin    3. Kidney stones          Plan:       1. BPH without obstruction/lower urinary tract symptoms  BECKY and PSA in a year  - Prostate Specific Antigen, Diagnostic; Future    2. ED (erectile dysfunction) of organic origin  Viagra    3. Kidney stones  stable  - POCT urinalysis, dipstick or tablet reag            Follow up in about 1 year (around 12/15/2021) for Follow up, Review PSA.

## 2021-01-11 ENCOUNTER — LAB VISIT (OUTPATIENT)
Dept: LAB | Facility: HOSPITAL | Age: 61
End: 2021-01-11
Attending: STUDENT IN AN ORGANIZED HEALTH CARE EDUCATION/TRAINING PROGRAM
Payer: COMMERCIAL

## 2021-01-11 LAB
ALBUMIN SERPL BCP-MCNC: 3.3 G/DL (ref 3.5–5.2)
ALP SERPL-CCNC: 125 U/L (ref 55–135)
ALT SERPL W/O P-5'-P-CCNC: 57 U/L (ref 10–44)
ANION GAP SERPL CALC-SCNC: 9 MMOL/L (ref 8–16)
AST SERPL-CCNC: 38 U/L (ref 10–40)
BILIRUB SERPL-MCNC: 0.4 MG/DL (ref 0.1–1)
BUN SERPL-MCNC: 18 MG/DL (ref 6–20)
CALCIUM SERPL-MCNC: 8.5 MG/DL (ref 8.7–10.5)
CHLORIDE SERPL-SCNC: 105 MMOL/L (ref 95–110)
CHOLEST SERPL-MCNC: 96 MG/DL (ref 120–199)
CHOLEST/HDLC SERPL: 3.4 {RATIO} (ref 2–5)
CO2 SERPL-SCNC: 25 MMOL/L (ref 23–29)
CREAT SERPL-MCNC: 1.5 MG/DL (ref 0.5–1.4)
EST. GFR  (AFRICAN AMERICAN): 58 ML/MIN/1.73 M^2
EST. GFR  (NON AFRICAN AMERICAN): 50 ML/MIN/1.73 M^2
ESTIMATED AVG GLUCOSE: 255 MG/DL (ref 68–131)
GLUCOSE SERPL-MCNC: 222 MG/DL (ref 70–110)
HBA1C MFR BLD HPLC: 10.5 % (ref 4–5.6)
HDLC SERPL-MCNC: 28 MG/DL (ref 40–75)
HDLC SERPL: 29.2 % (ref 20–50)
LDLC SERPL CALC-MCNC: 27.8 MG/DL (ref 63–159)
NONHDLC SERPL-MCNC: 68 MG/DL
POTASSIUM SERPL-SCNC: 4.4 MMOL/L (ref 3.5–5.1)
PROT SERPL-MCNC: 6.5 G/DL (ref 6–8.4)
SODIUM SERPL-SCNC: 139 MMOL/L (ref 136–145)
TRIGL SERPL-MCNC: 201 MG/DL (ref 30–150)

## 2021-01-11 PROCEDURE — 80053 COMPREHEN METABOLIC PANEL: CPT

## 2021-01-11 PROCEDURE — 83036 HEMOGLOBIN GLYCOSYLATED A1C: CPT

## 2021-01-11 PROCEDURE — 80061 LIPID PANEL: CPT

## 2021-01-11 PROCEDURE — 36415 COLL VENOUS BLD VENIPUNCTURE: CPT

## 2021-01-13 ENCOUNTER — PATIENT MESSAGE (OUTPATIENT)
Dept: FAMILY MEDICINE | Facility: CLINIC | Age: 61
End: 2021-01-13

## 2021-01-13 DIAGNOSIS — E11.42 DIABETIC PERIPHERAL NEUROPATHY ASSOCIATED WITH TYPE 2 DIABETES MELLITUS: ICD-10-CM

## 2021-01-13 DIAGNOSIS — F32.A DEPRESSION, UNSPECIFIED DEPRESSION TYPE: ICD-10-CM

## 2021-01-13 DIAGNOSIS — G89.4 CHRONIC PAIN SYNDROME: ICD-10-CM

## 2021-01-19 ENCOUNTER — TELEPHONE (OUTPATIENT)
Dept: ENDOCRINOLOGY | Facility: CLINIC | Age: 61
End: 2021-01-19

## 2021-01-20 ENCOUNTER — OFFICE VISIT (OUTPATIENT)
Dept: ENDOCRINOLOGY | Facility: CLINIC | Age: 61
End: 2021-01-20
Payer: COMMERCIAL

## 2021-01-20 VITALS
WEIGHT: 203.5 LBS | DIASTOLIC BLOOD PRESSURE: 80 MMHG | BODY MASS INDEX: 33.86 KG/M2 | RESPIRATION RATE: 16 BRPM | SYSTOLIC BLOOD PRESSURE: 138 MMHG | HEART RATE: 94 BPM | OXYGEN SATURATION: 95 %

## 2021-01-20 DIAGNOSIS — Z96.41 INSULIN PUMP IN PLACE: ICD-10-CM

## 2021-01-20 DIAGNOSIS — E78.00 PURE HYPERCHOLESTEROLEMIA: ICD-10-CM

## 2021-01-20 PROCEDURE — 95251 CONT GLUC MNTR ANALYSIS I&R: CPT | Mod: S$GLB,,, | Performed by: INTERNAL MEDICINE

## 2021-01-20 PROCEDURE — 3075F SYST BP GE 130 - 139MM HG: CPT | Mod: CPTII,S$GLB,, | Performed by: INTERNAL MEDICINE

## 2021-01-20 PROCEDURE — 99214 OFFICE O/P EST MOD 30 MIN: CPT | Mod: 25,S$GLB,, | Performed by: INTERNAL MEDICINE

## 2021-01-20 PROCEDURE — 99999 PR PBB SHADOW E&M-EST. PATIENT-LVL V: CPT | Mod: PBBFAC,,,

## 2021-01-20 PROCEDURE — 95251 PR GLUCOSE MONITOR, 72 HOUR, PHYS INTERP: ICD-10-PCS | Mod: S$GLB,,, | Performed by: INTERNAL MEDICINE

## 2021-01-20 PROCEDURE — 3046F PR MOST RECENT HEMOGLOBIN A1C LEVEL > 9.0%: ICD-10-PCS | Mod: CPTII,S$GLB,, | Performed by: INTERNAL MEDICINE

## 2021-01-20 PROCEDURE — 3075F PR MOST RECENT SYSTOLIC BLOOD PRESS GE 130-139MM HG: ICD-10-PCS | Mod: CPTII,S$GLB,, | Performed by: INTERNAL MEDICINE

## 2021-01-20 PROCEDURE — 3079F PR MOST RECENT DIASTOLIC BLOOD PRESSURE 80-89 MM HG: ICD-10-PCS | Mod: CPTII,S$GLB,, | Performed by: INTERNAL MEDICINE

## 2021-01-20 PROCEDURE — 99999 PR PBB SHADOW E&M-EST. PATIENT-LVL V: ICD-10-PCS | Mod: PBBFAC,,,

## 2021-01-20 PROCEDURE — 3046F HEMOGLOBIN A1C LEVEL >9.0%: CPT | Mod: CPTII,S$GLB,, | Performed by: INTERNAL MEDICINE

## 2021-01-20 PROCEDURE — 99214 PR OFFICE/OUTPT VISIT, EST, LEVL IV, 30-39 MIN: ICD-10-PCS | Mod: 25,S$GLB,, | Performed by: INTERNAL MEDICINE

## 2021-01-20 PROCEDURE — 3079F DIAST BP 80-89 MM HG: CPT | Mod: CPTII,S$GLB,, | Performed by: INTERNAL MEDICINE

## 2021-01-20 RX ORDER — BLOOD-GLUCOSE TRANSMITTER
1 EACH MISCELLANEOUS CONTINUOUS PRN
Qty: 1 EACH | Status: CANCELLED | OUTPATIENT
Start: 2021-01-20 | End: 2022-01-20

## 2021-01-20 RX ORDER — BLOOD-GLUCOSE SENSOR
3 EACH MISCELLANEOUS CONTINUOUS PRN
Qty: 3 EACH | Status: CANCELLED | OUTPATIENT
Start: 2021-01-20 | End: 2022-01-20

## 2021-01-20 RX ORDER — BLOOD-GLUCOSE TRANSMITTER
1 EACH MISCELLANEOUS CONTINUOUS PRN
Qty: 1 EACH | Status: SHIPPED | OUTPATIENT
Start: 2021-01-20 | End: 2022-01-20

## 2021-01-20 RX ORDER — BLOOD-GLUCOSE SENSOR
3 EACH MISCELLANEOUS CONTINUOUS PRN
Qty: 3 EACH | Status: SHIPPED | OUTPATIENT
Start: 2021-01-20 | End: 2022-01-20

## 2021-01-27 RX ORDER — GABAPENTIN 100 MG/1
CAPSULE ORAL
Qty: 120 CAPSULE | Refills: 1 | Status: SHIPPED | OUTPATIENT
Start: 2021-01-27 | End: 2021-03-10

## 2021-01-27 RX ORDER — DULOXETIN HYDROCHLORIDE 60 MG/1
60 CAPSULE, DELAYED RELEASE ORAL DAILY
Qty: 30 CAPSULE | Refills: 0 | Status: SHIPPED | OUTPATIENT
Start: 2021-01-27 | End: 2021-02-08 | Stop reason: SDUPTHER

## 2021-01-27 RX ORDER — NORTRIPTYLINE HYDROCHLORIDE 50 MG/1
CAPSULE ORAL
Qty: 30 CAPSULE | Refills: 2 | Status: SHIPPED | OUTPATIENT
Start: 2021-01-27 | End: 2021-04-09

## 2021-02-08 DIAGNOSIS — F32.A DEPRESSION, UNSPECIFIED DEPRESSION TYPE: ICD-10-CM

## 2021-02-08 DIAGNOSIS — G89.4 CHRONIC PAIN SYNDROME: ICD-10-CM

## 2021-02-08 RX ORDER — DULOXETIN HYDROCHLORIDE 60 MG/1
60 CAPSULE, DELAYED RELEASE ORAL DAILY
Qty: 30 CAPSULE | Refills: 0 | Status: SHIPPED | OUTPATIENT
Start: 2021-02-08 | End: 2021-04-09

## 2021-02-10 ENCOUNTER — TELEPHONE (OUTPATIENT)
Dept: FAMILY MEDICINE | Facility: CLINIC | Age: 61
End: 2021-02-10

## 2021-02-15 ENCOUNTER — PATIENT MESSAGE (OUTPATIENT)
Dept: FAMILY MEDICINE | Facility: CLINIC | Age: 61
End: 2021-02-15

## 2021-02-15 DIAGNOSIS — Z20.6 EXPOSURE TO HIV: ICD-10-CM

## 2021-02-18 NOTE — OR NURSING
Patient:   CHEIKH COTTER            MRN: [id number]            FIN: [id number]               Age:   50 years     Sex:  Female     :  69   Associated Diagnoses:   None   Author:   Jin Ferraro MD      Basic Information   Time seen: Date & time 17 19:55:00.   History source: Patient.   Arrival mode: Private vehicle.   History limitation: None.   Additional information: Chief Complaint from Nursing Triage Note : Chief Complaint   17 19:53           Chief Complaint           Elevated BP  .      History of Present Illness   50-year-old female with history of hypertension presents to emergency department for evaluation of hypertension. Patient notes that she was seeing her doctor today for a refill on her metoprolol medication but was not given it because her primary changed it to a different medication that she does not know the name of. At the clinic her blood pressure was elevated and she was advised to come to ER for further evaluation. Patient notes that she’s had a had a headache for past couple days, notes intermittent chest pain nonexertional, shortness of breath worse with exertion, intermittent neck pains.  The patient was also seen here couple of days ago for headache and hypertension.      Review of Systems     General: No fever.  Skin: No rash. No diaphoresis.  Eyes: No visual changes.  ENT: Ear pain, No sore throat.  Neck: No neck pain or stiffness.  Respiratory: Shortness of breath. No cough/cold symptoms.  Cardiac: HTN, Chest pain.  Gastrointestinal: No nausea, vomiting, diarrhea. No abdominal pain.  Urinary: No dysuria.  Musculoskeletal: No injury. No myalgias/arthralgias.  Neurologic: headache. No unilateral numbness/tingling/weakness.         Health Status   Allergies:    Allergic Reactions (All)  NKA.   Medications:  (Selected)   Documented Medications  Documented  amLODIPine 10 mg oral tablet: 10 mg, 1 tab(s), PO, qDay, 30 tab(s)  enalapril 20 mg oral tablet: 20 mg, 1  Patient alert and oriented, resting quietly. Vss, consistent with preop baseline. Dressing clean dry intact, injection site free of swelling, blood, or redness. Patient does not verbalize complaints or questions at this time. Will continue to monitor.   tab(s), PO, qDay, 30 tab(s)  ferrous sulfate 325 mg oral tablet: 325 mg, 1 tab, PO, BID  ibuprofen 800 mg oral tablet: 800 mg, 1 tab(s), PO, TID, PRN: for pain, 30 tab(s)  metoprolol tartrate 50 mg oral tablet: 50 mg, 1 tab, PO, BID, 180 tab  omeprazole 20 mg EC oral tablet: 60 mg, 3 tab, PO, qDay, before a meal, 90 tab  terbinafine 250 mg oral tablet: 250 mg, 1 tab, PO, qDay.      Past Medical/ Family/ Social History   Problem list:    Active Problems (5)  Antimicrobial resistant organism   Anxiety   DVT - Deep vein thrombosis   GERD - Gastro-esophageal reflux disease   Hypertension   .   Medical History:   Hypertension   DVT  Hyperlipidemia   GERD  Anxiety       Surgical History:  Cholecystectomy      Portocaval Shunt   Throat surgery   Stomach surgery in [place]       Family Hisory:  No history of early cardiac disease    Social History:  Illicit drugs: denies  Tobacco: denies  Alcohol: denies  Intact family, family at bedside       Physical Examination               Vital Signs   Vital Signs   17 19:05           Temperature Oral          97.9 F                             Peripheral Pulse Rate     95 bpm                             Respiratory Rate          18 br/min                             Systolic Blood Pressure   204 mmHg  HI                             Diastolic Blood Pressure  121 mmHg  HI                             Mean Arterial Pressure    149 mmHg                             Oxygen Saturation         100 %    17 01:01           Peripheral Pulse Rate     74 bpm                             Respiratory Rate          16 br/min                             Systolic Blood Pressure   173 mmHg  HI                             Diastolic Blood Pressure  102 mmHg  HI                             Mean Arterial Pressure    126 mmHg                             Oxygen Saturation         100 %  .   Measurements   17 19:05           Height                    164 cm                              Weight                    92 kg  .   Basic Oxygen Information   12/22/17 19:39           ECG - 12 Lead             Ordered  (In Progress)   12/22/17 19:37           Time Seen by Provider     12/22/17 19:28                             Emergency Department Note-Physician       ED Time Seen By Provider    12/22/17 19:05           Height                    164 cm                             Weight                    92 kg                             Temperature Oral          97.9 F                             Peripheral Pulse Rate     95 bpm                             Respiratory Rate          18 br/min                             Systolic Blood Pressure   204 mmHg  HI                             Diastolic Blood Pressure  121 mmHg  HI                             Mean Arterial Pressure    149 mmHg                             Oxygen Saturation         100 %                             Oxygen Therapy            Room air                             Emergency Department Note-Nursing         ED Vital Signs    12/21/17 01:36           ED Patient Education      ED Patient Education  (Modified)   12/21/17 01:36           ED Patient Summary        ED Patient Summary  (Modified)   12/21/17 01:36           ED Discharge Summary      ED Discharge Summary  (Modified)   12/21/17 01:09           General Within Defined Limits             WDL's                             Cardiovascular Within Defined Limits      WDL's                             Respiratory Within Defined Limits         WDL's                             Gastrointestinal Within Defined Limits    WDL's                             Genitourinary Within Defined Limits       WDL's                             Musculoskeletal Within Defined Limits     WDL's                             Integumentary Within Defined Limits       WDL's                             Neurological Within Defined Limits        WDL's                             Adult Ongoing Assessment Form              Adult Ongoing Assessment Form    12/21/17 01:01           Peripheral Pulse Rate     74 bpm                             Respiratory Rate          16 br/min                             Systolic Blood Pressure   173 mmHg  HI                             Diastolic Blood Pressure  102 mmHg  HI                             Mean Arterial Pressure    126 mmHg                             Oxygen Saturation         100 %                             Pain Symptoms             Yes                             Primary Pain Location     Head                             Primary Pain Laterality   Bilateral                             Primary Pain Intensity    6                             Acceptable Pain Intensity 3                             Primary Pain Radiation    No                             Pain Associated Symptoms  None                             Primary Pain Aggravating Factors          None                             Primary Pain Alleviating Factors          Medications                             Primary Pain Onset        Sudden                             Primary Pain Quality      Aching                             Primary Pain Time Pattern Acute                             Oxygen Therapy            Room air    12/21/17 01:00           Emergency Department Note-Nursing             12/21/17 00:38           CT Brain w/o Contrast     CT Brain w/o Contrast    12/21/17 00:18           Urine Preg POC            Negative    12/21/17 00:11           diphenhydramine           25 mg mg                             metoclopramide            10 mg mg    12/21/17 00:00           Triage Note               TRIAGE NOTE                             Facesheet (OP)            FACESHEET (OP)                             Health Care Consent - English             HEALTH CARE CONSENT - ENGLISH  .     General Apperance: Well appearing, no acute distress, appears comfortable.  Skin: No rash, no diaphoresis.  HEENT: Normocephalic/atraumatic, sclera  anicteric, mucous membranes moist, posterior oropharynx normal.  Neck: Normal range of motion, non-tender, no LAD.  Chest and Lungs: Bilateral breath sounds equal, clear to auscultation. No rales, wheezes, ronchi or stridor.  Cardiovascular: Regular rate and rhythm, no murmur, no rub.  Abdomen: Soft, non-tender, non-distended.  Back: Normal, non-tender.  Musculoskeletal: No edema or tenderness, FROM throughout, no injury.  Neurologic: Awake, alert, no obvious deficits, moving all extremities equally.  Psychiatric: Appropriate, cooperative.       Medical Decision Making   Electrocardiogram:  ECG Interpretation   12/22/17 19:39           ECG - 12 Lead             Ordered  (In Progress)   12/22/17 19:37           Time Seen by Provider     12/22/17 19:28                             Emergency Department Note-Physician       ED Time Seen By Provider    12/22/17 19:05           Height                    164 cm                             Weight                    92 kg                             Temperature Oral          97.9 F                             Peripheral Pulse Rate     95 bpm                             Respiratory Rate          18 br/min                             Systolic Blood Pressure   204 mmHg  HI                             Diastolic Blood Pressure  121 mmHg  HI                             Mean Arterial Pressure    149 mmHg                             Oxygen Saturation         100 %                             Oxygen Therapy            Room air                             Emergency Department Note-Nursing         ED Vital Signs    12/21/17 01:36           ED Patient Education      ED Patient Education  (Modified)   12/21/17 01:36           ED Patient Summary        ED Patient Summary  (Modified)   12/21/17 01:36           ED Discharge Summary      ED Discharge Summary  (Modified)   12/21/17 01:09           General Within Defined Limits             WDL's                             Cardiovascular Within  Defined Limits      WDL's                             Respiratory Within Defined Limits         WDL's                             Gastrointestinal Within Defined Limits    WDL's                             Genitourinary Within Defined Limits       WDL's                             Musculoskeletal Within Defined Limits     WDL's                             Integumentary Within Defined Limits       WDL's                             Neurological Within Defined Limits        WDL's                             Adult Ongoing Assessment Form             Adult Ongoing Assessment Form    12/21/17 01:01           Peripheral Pulse Rate     74 bpm                             Respiratory Rate          16 br/min                             Systolic Blood Pressure   173 mmHg  HI                             Diastolic Blood Pressure  102 mmHg  HI                             Mean Arterial Pressure    126 mmHg                             Oxygen Saturation         100 %                             Pain Symptoms             Yes                             Primary Pain Location     Head                             Primary Pain Laterality   Bilateral                             Primary Pain Intensity    6                             Acceptable Pain Intensity 3                             Primary Pain Radiation    No                             Pain Associated Symptoms  None                             Primary Pain Aggravating Factors          None                             Primary Pain Alleviating Factors          Medications                             Primary Pain Onset        Sudden                             Primary Pain Quality      Aching                             Primary Pain Time Pattern Acute                             Oxygen Therapy            Room air    12/21/17 01:00           Emergency Department Note-Nursing             12/21/17 00:38           CT Brain w/o Contrast     CT Brain w/o Contrast    12/21/17 00:18            Urine Preg POC            Negative    12/21/17 00:11           diphenhydramine           25 mg mg                             metoclopramide            10 mg mg    12/21/17 00:00           Triage Note               TRIAGE NOTE                             Facesheet (OP)            FACESHEET (OP)                             Health Care Consent - English             HEALTH CARE CONSENT - ENGLISH  .   Results review:  Lab results : Lab View   12/22/17 19:45           Glucose Lvl               111 mg/dL  HI                             BUN                       7 mg/dL                             Creatinine                0.66 mg/dL                             eGFR AfrAmer              >60 mL/min/1.73m2  NA                             eGFR NonAfrAmer           >60 mL/min/1.73m2  NA                             Sodium                    138 mEq/L                             Potassium                 3.5 mEq/L                             Chloride                  109 mEq/L  HI                             TCO2                      22 mEq/L                             AGAP                      11 mEq/L                             Calcium                   8.3 mg/dL  LOW                             Alk Phos                  119 unit/L                             Bili Total                1.1 mg/dL  HI                             Total Protein             7.3 g/dL                             Albumin                   3.0 g/dL  LOW                             Globulin_                 4.3 g/dL  HI                             A/G Ratio_                0.7  NA                             AST/GOT                   41 unit/L  HI                             ALT/GPT                   21 unit/L                             Troponin I                <0.01 ng/mL                             WBC                       4.7 K/cumm                             RBC                       3.58 M/cumm  LOW                             Hgb                        9.2 g/dL  LOW                             Hct                       30 %  LOW                             MCV                       84 FL                             MCH                       26 pg                             MCHC                      31 g/dL  LOW                             RDW                       16.8 %  HI                             Platelet                  74 K/cumm  LOW                             NRBC                      0.0 %                             Abs Neutro                2.2 K/cumm                             Neutrophil                46 %  NA                             Abs Lymph                 1.6 K/cumm                             Lymphocyte                33 %  NA                             Abs Mono                  0.6 K/cumm                             Monocyte                  12 %  NA                             Immature Gran             0.2 %                             Eosinophil                7 %  HI                             Basophil                  1 %                             PT                        12.9 seconds  HI                             INR                       1.3  HI    12/21/17 00:18           Urine Preg POC            Negative  .   Chest X-Ray:  Result type: XR Chest Portable  Result date: 22 December 2017 20:19   Verified by: Camacho GALLEGOS, [facility] on 22 December 2017 20:31     FINDINGS:  Heart is normal in size. Pulmonary vasculature is normal.   No airspace consolidation or pleural fluid collection is noted. No pneumothorax is noted on this portable film. Mediastinum, soft tissues and bones are unchanged on the portable film.     IMPRESSION:   1. No acute cardiopulmonary abnormality, radiographically..   EKG shows normal sinus rhythm at 85 bpm, no acute ischemic changes, no ST elevations, intervals are otherwise normal.  Cardiac monitor shows normal sinus rhythm.    Labs are obtained, hematoma slightly low at 9.2, INR is 1.3.  Chest  x-ray shows no acute findings.  The patient's blood pressure has been somewhat labile here, she is given small dose of labetalol 10 mg IV.  The patient presents with ongoing significantly elevated blood pressure with intermittent chest pain, dyspnea, headaches.  Patient requires admission for observation for her chest pain, blood pressure control.  I spoke with [facility] who agrees to admit.  Cardiology Dr. Cortez on consult.      Impression and Plan   Diagnosis   Chest Pain  Hypertensive Urgency   Plan   Condition: Stable.    Disposition: Discharged: to home.    Counseled: Patient, Family, Regarding diagnosis, Regarding treatment plan.    Notes: All medical record entries made by the scribe were at my direction. I have reviewed the chart and agree that the record accurately reflects my personal performance of the history, physical exam, medical decision making, and the emergency course for this patient. I have also personally directed, reviewed, and agree with the discharge instruction and disposition., This encounter was documented by pedro luis Pierce for Dr. Ferraro..

## 2021-02-19 ENCOUNTER — PATIENT MESSAGE (OUTPATIENT)
Dept: FAMILY MEDICINE | Facility: CLINIC | Age: 61
End: 2021-02-19

## 2021-02-19 RX ORDER — EMTRICITABINE AND TENOFOVIR DISOPROXIL FUMARATE 200; 300 MG/1; MG/1
1 TABLET, FILM COATED ORAL DAILY
Qty: 30 TABLET | Refills: 2 | Status: SHIPPED | OUTPATIENT
Start: 2021-02-19 | End: 2021-11-18 | Stop reason: SDUPTHER

## 2021-03-11 ENCOUNTER — PATIENT MESSAGE (OUTPATIENT)
Dept: FAMILY MEDICINE | Facility: CLINIC | Age: 61
End: 2021-03-11

## 2021-04-02 ENCOUNTER — PATIENT MESSAGE (OUTPATIENT)
Dept: ENDOCRINOLOGY | Facility: CLINIC | Age: 61
End: 2021-04-02

## 2021-04-05 ENCOUNTER — PATIENT MESSAGE (OUTPATIENT)
Dept: ENDOCRINOLOGY | Facility: CLINIC | Age: 61
End: 2021-04-05

## 2021-04-05 ENCOUNTER — PATIENT MESSAGE (OUTPATIENT)
Dept: PODIATRY | Facility: CLINIC | Age: 61
End: 2021-04-05

## 2021-04-05 RX ORDER — INSULIN LISPRO 100 [IU]/ML
INJECTION, SOLUTION INTRAVENOUS; SUBCUTANEOUS
Qty: 50 ML | Refills: 5 | Status: SHIPPED | OUTPATIENT
Start: 2021-04-05 | End: 2021-07-22 | Stop reason: CLARIF

## 2021-04-05 RX ORDER — INSULIN LISPRO 100 [IU]/ML
INJECTION, SOLUTION INTRAVENOUS; SUBCUTANEOUS
Qty: 10 ML | Refills: 11 | Status: SHIPPED | OUTPATIENT
Start: 2021-04-05 | End: 2021-12-27 | Stop reason: SDUPTHER

## 2021-04-14 ENCOUNTER — LAB VISIT (OUTPATIENT)
Dept: LAB | Facility: HOSPITAL | Age: 61
End: 2021-04-14
Attending: INTERNAL MEDICINE
Payer: COMMERCIAL

## 2021-04-14 LAB
ALBUMIN/CREAT UR: 114.6 UG/MG (ref 0–30)
CREAT UR-MCNC: 89 MG/DL (ref 23–375)
MICROALBUMIN UR DL<=1MG/L-MCNC: 102 UG/ML

## 2021-04-14 PROCEDURE — 82043 UR ALBUMIN QUANTITATIVE: CPT | Performed by: GENERAL ACUTE CARE HOSPITAL

## 2021-04-14 PROCEDURE — 82570 ASSAY OF URINE CREATININE: CPT | Performed by: GENERAL ACUTE CARE HOSPITAL

## 2021-04-21 ENCOUNTER — OFFICE VISIT (OUTPATIENT)
Dept: ENDOCRINOLOGY | Facility: CLINIC | Age: 61
End: 2021-04-21
Payer: COMMERCIAL

## 2021-04-21 ENCOUNTER — LAB VISIT (OUTPATIENT)
Dept: LAB | Facility: HOSPITAL | Age: 61
End: 2021-04-21
Payer: COMMERCIAL

## 2021-04-21 VITALS
WEIGHT: 202.63 LBS | BODY MASS INDEX: 33.71 KG/M2 | OXYGEN SATURATION: 99 % | SYSTOLIC BLOOD PRESSURE: 142 MMHG | DIASTOLIC BLOOD PRESSURE: 80 MMHG | RESPIRATION RATE: 16 BRPM | HEART RATE: 68 BPM

## 2021-04-21 DIAGNOSIS — Z96.41 INSULIN PUMP IN PLACE: ICD-10-CM

## 2021-04-21 DIAGNOSIS — E78.00 PURE HYPERCHOLESTEROLEMIA: ICD-10-CM

## 2021-04-21 LAB
ESTIMATED AVG GLUCOSE: 243 MG/DL (ref 68–131)
HBA1C MFR BLD: 10.1 % (ref 4–5.6)

## 2021-04-21 PROCEDURE — 99999 PR PBB SHADOW E&M-EST. PATIENT-LVL III: CPT | Mod: PBBFAC,,,

## 2021-04-21 PROCEDURE — 99999 PR PBB SHADOW E&M-EST. PATIENT-LVL III: ICD-10-PCS | Mod: PBBFAC,,,

## 2021-04-21 PROCEDURE — 99214 OFFICE O/P EST MOD 30 MIN: CPT | Mod: 25,S$GLB,, | Performed by: INTERNAL MEDICINE

## 2021-04-21 PROCEDURE — 95251 CONT GLUC MNTR ANALYSIS I&R: CPT | Mod: S$GLB,,, | Performed by: INTERNAL MEDICINE

## 2021-04-21 PROCEDURE — 99214 PR OFFICE/OUTPT VISIT, EST, LEVL IV, 30-39 MIN: ICD-10-PCS | Mod: 25,S$GLB,, | Performed by: INTERNAL MEDICINE

## 2021-04-21 PROCEDURE — 36415 COLL VENOUS BLD VENIPUNCTURE: CPT | Performed by: STUDENT IN AN ORGANIZED HEALTH CARE EDUCATION/TRAINING PROGRAM

## 2021-04-21 PROCEDURE — 95251 PR GLUCOSE MONITOR, 72 HOUR, PHYS INTERP: ICD-10-PCS | Mod: S$GLB,,, | Performed by: INTERNAL MEDICINE

## 2021-04-21 PROCEDURE — 83036 HEMOGLOBIN GLYCOSYLATED A1C: CPT | Performed by: STUDENT IN AN ORGANIZED HEALTH CARE EDUCATION/TRAINING PROGRAM

## 2021-04-21 RX ORDER — LANCETS
EACH MISCELLANEOUS
Qty: 100 EACH | Refills: 6 | Status: SHIPPED | OUTPATIENT
Start: 2021-04-21

## 2021-04-21 RX ORDER — GLUCAGON 3 MG/1
3 POWDER NASAL ONCE AS NEEDED
Qty: 2 EACH | Refills: 3 | Status: SHIPPED | OUTPATIENT
Start: 2021-04-21 | End: 2021-04-21

## 2021-04-21 RX ORDER — INSULIN PUMP SYRINGE, 3 ML
EACH MISCELLANEOUS
Qty: 1 EACH | Refills: 0 | Status: SHIPPED | OUTPATIENT
Start: 2021-04-21 | End: 2022-02-09

## 2021-05-06 RX ORDER — NORTRIPTYLINE HYDROCHLORIDE 50 MG/1
CAPSULE ORAL
Qty: 30 CAPSULE | Refills: 2 | Status: SHIPPED | OUTPATIENT
Start: 2021-05-06 | End: 2021-08-08

## 2021-06-02 DIAGNOSIS — G89.4 CHRONIC PAIN SYNDROME: ICD-10-CM

## 2021-06-02 DIAGNOSIS — F32.A DEPRESSION, UNSPECIFIED DEPRESSION TYPE: ICD-10-CM

## 2021-06-02 RX ORDER — DULOXETIN HYDROCHLORIDE 60 MG/1
CAPSULE, DELAYED RELEASE ORAL
Qty: 30 CAPSULE | Refills: 0 | Status: SHIPPED | OUTPATIENT
Start: 2021-06-02 | End: 2021-06-24

## 2021-06-16 ENCOUNTER — PATIENT MESSAGE (OUTPATIENT)
Dept: ENDOCRINOLOGY | Facility: CLINIC | Age: 61
End: 2021-06-16

## 2021-06-23 ENCOUNTER — PATIENT MESSAGE (OUTPATIENT)
Dept: ENDOCRINOLOGY | Facility: CLINIC | Age: 61
End: 2021-06-23

## 2021-07-21 ENCOUNTER — PATIENT MESSAGE (OUTPATIENT)
Dept: ENDOCRINOLOGY | Facility: CLINIC | Age: 61
End: 2021-07-21

## 2021-07-21 ENCOUNTER — PATIENT OUTREACH (OUTPATIENT)
Dept: ADMINISTRATIVE | Facility: OTHER | Age: 61
End: 2021-07-21

## 2021-07-21 ENCOUNTER — PATIENT OUTREACH (OUTPATIENT)
Dept: ENDOCRINOLOGY | Facility: CLINIC | Age: 61
End: 2021-07-21

## 2021-07-21 ENCOUNTER — OFFICE VISIT (OUTPATIENT)
Dept: ENDOCRINOLOGY | Facility: CLINIC | Age: 61
End: 2021-07-21
Payer: COMMERCIAL

## 2021-07-21 VITALS
SYSTOLIC BLOOD PRESSURE: 130 MMHG | OXYGEN SATURATION: 99 % | DIASTOLIC BLOOD PRESSURE: 82 MMHG | WEIGHT: 203.94 LBS | RESPIRATION RATE: 16 BRPM | BODY MASS INDEX: 33.93 KG/M2

## 2021-07-21 PROCEDURE — 95251 PR GLUCOSE MONITOR, 72 HOUR, PHYS INTERP: ICD-10-PCS | Mod: S$GLB,,, | Performed by: INTERNAL MEDICINE

## 2021-07-21 PROCEDURE — 99999 PR PBB SHADOW E&M-EST. PATIENT-LVL V: CPT | Mod: PBBFAC,,, | Performed by: INTERNAL MEDICINE

## 2021-07-21 PROCEDURE — 95251 CONT GLUC MNTR ANALYSIS I&R: CPT | Mod: S$GLB,,, | Performed by: INTERNAL MEDICINE

## 2021-07-21 PROCEDURE — 99999 PR PBB SHADOW E&M-EST. PATIENT-LVL V: ICD-10-PCS | Mod: PBBFAC,,, | Performed by: INTERNAL MEDICINE

## 2021-07-21 PROCEDURE — 99214 PR OFFICE/OUTPT VISIT, EST, LEVL IV, 30-39 MIN: ICD-10-PCS | Mod: 25,S$GLB,, | Performed by: INTERNAL MEDICINE

## 2021-07-21 PROCEDURE — 99214 OFFICE O/P EST MOD 30 MIN: CPT | Mod: 25,S$GLB,, | Performed by: INTERNAL MEDICINE

## 2021-07-21 NOTE — PROGRESS NOTES
Physical Therapy Daily Note   Name: Cortes Schroeder  Clinic Number: [id number]    Diagnosis:   Encounter Diagnoses   Name Primary?    Neck pain     Muscle weakness     Impaired motor control     Decreased range of motion of shoulder, unspecified laterality      Physician: Moe Dahl MD  Treatment Orders: PT Eval and Treat    Precautions: Diabetic, Impaired hearing   Visit #: 6 of 36  Total visit #: 8  Eval date: 5/10/2018  G-code reported: Initial Evaluation (Visit #1)   Certification period: 5/10/2018 - 8/2/2018 (PN Due 7/11/2018)    Subjective     Cortes reports: that he feels a little bit better today and was actually able to sleep a little bit last night. Patient reports pain down into his Right foot this morning.  Pain Scale:  3 out of 10 in his NECK and a 3 out of 10 in his BACK, currently.    Objective     Time In: 0852   Time Out: 1007   Total Treatment time: 65 minutes (1:1 with PTA for 38 mins of treatment session)     Patient performed the following therapeutic exercises for 55 minutes in order to strengthen and stretch cervical and lumbar region:  +Nustep: 7 minutes - Level 1.5 (collected subjective data, reviewed HEP)  Pulleys: 6 minutes (flexion and abduction)   Chin tucks: 20 x 5 sec holds  Scapular retractions: 30x  Upper trap stretch: 3 x 20 seconds   Scalenes stretch: 3 x 20 seconds   Cervical isometrics (lateral and retraction): 20 x 3 sec holds c yellow TB   Supine chin tucks: 20 x 5 sec holds   Standing rows: 2 x 10 x Red TB  Standing shoulder ext: 2 x 10 x Red TB   Supine pec stretch on foam roll: 3 minutes   DKTC on SB with UE flexion: 3 x 10 (hands clasped)   +Pelvic tilts (isoflexion with red ball): 2 x 10 x 3 sec hold  +Supine hamstring stretch: 4 x 20 sec hold ea.  +Calf stretch on incline board: 4 x 20 sec hold  +Seated sciatic nerve glides: 2 x 10 (LAQ with active ankle DF)    Bed mobility (log roll) and postural correction x 5  minutes    Manual therapy performed on cervical region:  00 mins x suboccipital release, Steven upper trap stretch, cervical distraction    Mechanical cervical traction performed for 2 min, 4 min, and 4 min increments of 20 pound static holds with 30 second rest in between treatment for a total of 10 minutes.    Moist heat pack was placed on cervical region for 10 minutes to reduce tension and stiffness in cervical region.     Written Home Exercises Provided: Patient educated to continue with previously issued HEP in order to complement therapy sessions.   Exercises were reviewed and Cortes was able to demonstrate them prior to the end of the session. Patient received a written copy of exercises to perform at home. Cortes demonstrated good  understanding of the education provided.     Assessment     Patient tolerated treatment session fairly well today. Good tolerance to exercises performed reporting no exacerbation of low back or neck pain. Patient reported Right sided head pain with supine hamstring stretch, however pain diminished after the first repetition. Patient challenged with pelvic tilts requiring use of swiss ball for pelvic rotation. Patient reported centralization of Right LE radicular symptoms into his low back post treatment session as well as decreased Right UE pain. Patient will continue to benefit from skilled PT services in order to address limitations present in problem list and education on proper advancement of HEP.     Pt's spiritual, cultural and educational needs considered and pt agreeable to plan of care and goals.    Prognosis: Good     Anticipated barriers to physical therapy: none    Medical necessity is demonstrated by the following IMPAIRMENTS/PROBLEM LIST:   1) Increase in pain level limiting function   2) Decreased cervical strength     3) Decreased cervical ROM    4) Muscle weakness    5) Poor posture    6) Lack of HEP    GOALS: Short Term Goals:  6 weeks  1. Patient will report a  CT Abd (7/20) Pericapsular rim-enhancing fluid collection along the posterior hepatic dome, with differential including abscess or an organized hematoma. Fluid sampling may be helpful, though its posterior subdiaphragmatic location may make this difficult. Consider further characterization with contrast enhanced abdominal MRI as clinically indicated. Moderate right and small left pleural effusions are unchanged. CTH (7/13)  No significant interval change from 2/22/2021, redemonstration volume loss, microvascular disease, no acute hemorrhage or midline shift. If symptoms persist consider follow-up head CT or MR if no contraindications. decrease in cervical pain  <   / =  3/10 to increase tolerance for daily activities. - In progress 80%  2. Patient will be able to increase 20 degrees of shoulder ROM on right to improve tolerance to daily activities.  - In progress   3. Patient will be able to demonstrate an increase of 5  degrees of pain free motion in cervical region to improve mobility. - In progress 80%  4. Patient will be able to tolerate HEP to improve ROM and independence with ADL's. - MET (6/11/2018)  5. Patient will be able to demonstrate proper resting and activity posture to improve shoulder mechanics with activity. - MET (6/11/2018) inconsistently     Long Term Goals: 12 weeks  1. Patient will report a decrease in shoulder pain  <   / =  2/10 at worse to increase tolerance for walking and ADL activities.  2. Patient will be able to increase MMT to 4+/5 in right shoulder ER to increase tolerance for work and golfing activities.   3. Patient will be able to increase MMT to 5/5 in cervical region to increase tolerance for work and golfing activities.  4. Patient will be able to demonstrate an increase of 30 degrees of pain free motion in cervical region to improve mobility and ADLs    5. Patient will be Independent with HEP to improve ROM and independence with ADL's    Plan     Continue with established Plan of Care towards PT goals.     Certification period: 5/10/2018 -8/2/2018 (PN Due 7/11/2018)    Meghana Strong, PTA  6/14/2018   CT Abd (7/20) Pericapsular rim-enhancing fluid collection along the posterior hepatic dome, with differential including abscess or an organized hematoma. Fluid sampling may be helpful, though its posterior subdiaphragmatic location may make this difficult. Consider further characterization with contrast enhanced abdominal MRI as clinically indicated. Moderate right and small left pleural effusions are unchanged. CTH (7/13)  No significant interval change from 2/22/2021, redemonstration volume loss, microvascular disease, no acute hemorrhage or midline shift. If symptoms persist consider follow-up head CT or MR if no contraindications./no weight-bearing restrictions

## 2021-08-04 ENCOUNTER — PATIENT MESSAGE (OUTPATIENT)
Dept: ADMINISTRATIVE | Facility: HOSPITAL | Age: 61
End: 2021-08-04

## 2021-08-06 ENCOUNTER — PATIENT MESSAGE (OUTPATIENT)
Dept: ENDOCRINOLOGY | Facility: CLINIC | Age: 61
End: 2021-08-06

## 2021-08-09 ENCOUNTER — PATIENT MESSAGE (OUTPATIENT)
Dept: ADMINISTRATIVE | Facility: OTHER | Age: 61
End: 2021-08-09

## 2021-08-10 DIAGNOSIS — Z96.41 INSULIN PUMP IN PLACE: ICD-10-CM

## 2021-08-10 RX ORDER — INSULIN PUMP CONTROLLER
1 EACH MISCELLANEOUS
Qty: 10 EACH | Refills: 11 | Status: SHIPPED | OUTPATIENT
Start: 2021-08-10 | End: 2021-08-12 | Stop reason: SDUPTHER

## 2021-08-12 ENCOUNTER — PATIENT MESSAGE (OUTPATIENT)
Dept: ENDOCRINOLOGY | Facility: CLINIC | Age: 61
End: 2021-08-12

## 2021-08-12 RX ORDER — INSULIN PUMP CONTROLLER
EACH MISCELLANEOUS
Qty: 60 EACH | Refills: 3 | Status: SHIPPED | OUTPATIENT
Start: 2021-08-12

## 2021-08-20 ENCOUNTER — PATIENT MESSAGE (OUTPATIENT)
Dept: FAMILY MEDICINE | Facility: CLINIC | Age: 61
End: 2021-08-20

## 2021-08-20 DIAGNOSIS — E11.42 DIABETIC PERIPHERAL NEUROPATHY ASSOCIATED WITH TYPE 2 DIABETES MELLITUS: ICD-10-CM

## 2021-08-23 ENCOUNTER — PATIENT MESSAGE (OUTPATIENT)
Dept: ENDOCRINOLOGY | Facility: CLINIC | Age: 61
End: 2021-08-23

## 2021-08-23 RX ORDER — GABAPENTIN 100 MG/1
CAPSULE ORAL
Qty: 120 CAPSULE | Refills: 0 | Status: SHIPPED | OUTPATIENT
Start: 2021-08-23 | End: 2021-08-23 | Stop reason: SDUPTHER

## 2021-08-23 RX ORDER — GABAPENTIN 100 MG/1
CAPSULE ORAL
Qty: 120 CAPSULE | Refills: 0 | Status: SHIPPED | OUTPATIENT
Start: 2021-08-23 | End: 2021-11-18 | Stop reason: SDUPTHER

## 2021-09-20 ENCOUNTER — OFFICE VISIT (OUTPATIENT)
Dept: FAMILY MEDICINE | Facility: CLINIC | Age: 61
End: 2021-09-20
Payer: COMMERCIAL

## 2021-09-20 VITALS — SYSTOLIC BLOOD PRESSURE: 130 MMHG | DIASTOLIC BLOOD PRESSURE: 80 MMHG

## 2021-09-20 DIAGNOSIS — G89.29 CHRONIC BILATERAL LOW BACK PAIN WITHOUT SCIATICA: ICD-10-CM

## 2021-09-20 DIAGNOSIS — I10 HYPERTENSION, WELL CONTROLLED: ICD-10-CM

## 2021-09-20 DIAGNOSIS — G54.2 CERVICAL SYNDROME: ICD-10-CM

## 2021-09-20 DIAGNOSIS — K21.9 GASTROESOPHAGEAL REFLUX DISEASE WITHOUT ESOPHAGITIS: ICD-10-CM

## 2021-09-20 DIAGNOSIS — M48.02 NEUROFORAMINAL STENOSIS OF CERVICAL SPINE: ICD-10-CM

## 2021-09-20 DIAGNOSIS — M54.50 CHRONIC BILATERAL LOW BACK PAIN WITHOUT SCIATICA: ICD-10-CM

## 2021-09-20 DIAGNOSIS — M47.812 CERVICAL SPONDYLOSIS WITHOUT MYELOPATHY: ICD-10-CM

## 2021-09-20 DIAGNOSIS — E78.00 PURE HYPERCHOLESTEROLEMIA: ICD-10-CM

## 2021-09-20 DIAGNOSIS — Z00.00 VISIT FOR WELL MAN HEALTH CHECK: Primary | ICD-10-CM

## 2021-09-20 DIAGNOSIS — E11.21 DIABETIC NEPHROPATHY ASSOCIATED WITH TYPE 2 DIABETES MELLITUS: ICD-10-CM

## 2021-09-20 DIAGNOSIS — G62.9 NEUROPATHY: ICD-10-CM

## 2021-09-20 DIAGNOSIS — E11.3299 NPDR (NONPROLIFERATIVE DIABETIC RETINOPATHY): ICD-10-CM

## 2021-09-20 PROCEDURE — 99396 PREV VISIT EST AGE 40-64: CPT | Mod: 95,,, | Performed by: FAMILY MEDICINE

## 2021-09-20 PROCEDURE — 3060F PR POS MICROALBUMINURIA RESULT DOCUMENTED/REVIEW: ICD-10-PCS | Mod: CPTII,95,, | Performed by: FAMILY MEDICINE

## 2021-09-20 PROCEDURE — 1160F RVW MEDS BY RX/DR IN RCRD: CPT | Mod: CPTII,95,, | Performed by: FAMILY MEDICINE

## 2021-09-20 PROCEDURE — 3066F PR DOCUMENTATION OF TREATMENT FOR NEPHROPATHY: ICD-10-PCS | Mod: CPTII,95,, | Performed by: FAMILY MEDICINE

## 2021-09-20 PROCEDURE — 4010F ACE/ARB THERAPY RXD/TAKEN: CPT | Mod: CPTII,95,, | Performed by: FAMILY MEDICINE

## 2021-09-20 PROCEDURE — 3075F SYST BP GE 130 - 139MM HG: CPT | Mod: CPTII,95,, | Performed by: FAMILY MEDICINE

## 2021-09-20 PROCEDURE — 3046F HEMOGLOBIN A1C LEVEL >9.0%: CPT | Mod: CPTII,95,, | Performed by: FAMILY MEDICINE

## 2021-09-20 PROCEDURE — 3079F PR MOST RECENT DIASTOLIC BLOOD PRESSURE 80-89 MM HG: ICD-10-PCS | Mod: CPTII,95,, | Performed by: FAMILY MEDICINE

## 2021-09-20 PROCEDURE — 3079F DIAST BP 80-89 MM HG: CPT | Mod: CPTII,95,, | Performed by: FAMILY MEDICINE

## 2021-09-20 PROCEDURE — 1159F MED LIST DOCD IN RCRD: CPT | Mod: CPTII,95,, | Performed by: FAMILY MEDICINE

## 2021-09-20 PROCEDURE — 1160F PR REVIEW ALL MEDS BY PRESCRIBER/CLIN PHARMACIST DOCUMENTED: ICD-10-PCS | Mod: CPTII,95,, | Performed by: FAMILY MEDICINE

## 2021-09-20 PROCEDURE — 3075F PR MOST RECENT SYSTOLIC BLOOD PRESS GE 130-139MM HG: ICD-10-PCS | Mod: CPTII,95,, | Performed by: FAMILY MEDICINE

## 2021-09-20 PROCEDURE — 3046F PR MOST RECENT HEMOGLOBIN A1C LEVEL > 9.0%: ICD-10-PCS | Mod: CPTII,95,, | Performed by: FAMILY MEDICINE

## 2021-09-20 PROCEDURE — 3060F POS MICROALBUMINURIA REV: CPT | Mod: CPTII,95,, | Performed by: FAMILY MEDICINE

## 2021-09-20 PROCEDURE — 99396 PR PREVENTIVE VISIT,EST,40-64: ICD-10-PCS | Mod: 95,,, | Performed by: FAMILY MEDICINE

## 2021-09-20 PROCEDURE — 4010F PR ACE/ARB THEARPY RXD/TAKEN: ICD-10-PCS | Mod: CPTII,95,, | Performed by: FAMILY MEDICINE

## 2021-09-20 PROCEDURE — 3066F NEPHROPATHY DOC TX: CPT | Mod: CPTII,95,, | Performed by: FAMILY MEDICINE

## 2021-09-20 PROCEDURE — 1159F PR MEDICATION LIST DOCUMENTED IN MEDICAL RECORD: ICD-10-PCS | Mod: CPTII,95,, | Performed by: FAMILY MEDICINE

## 2021-09-23 ENCOUNTER — PATIENT MESSAGE (OUTPATIENT)
Dept: FAMILY MEDICINE | Facility: CLINIC | Age: 61
End: 2021-09-23

## 2021-09-24 LAB
ALBUMIN SERPL-MCNC: 4.1 G/DL (ref 3.8–4.9)
ALBUMIN/GLOB SERPL: 1.8 {RATIO} (ref 1.2–2.2)
ALP SERPL-CCNC: 101 IU/L (ref 44–121)
ALT SERPL-CCNC: 33 IU/L (ref 0–44)
AST SERPL-CCNC: 23 IU/L (ref 0–40)
BASOPHILS # BLD AUTO: 0.1 X10E3/UL (ref 0–0.2)
BASOPHILS NFR BLD AUTO: 1 %
BILIRUB SERPL-MCNC: 0.3 MG/DL (ref 0–1.2)
BUN SERPL-MCNC: 13 MG/DL (ref 8–27)
BUN/CREAT SERPL: 12 (ref 10–24)
C TRACH RRNA SPEC QL NAA+PROBE: NEGATIVE
CALCIUM SERPL-MCNC: 9 MG/DL (ref 8.6–10.2)
CHLORIDE SERPL-SCNC: 106 MMOL/L (ref 96–106)
CHOLEST SERPL-MCNC: 104 MG/DL (ref 100–199)
CO2 SERPL-SCNC: 23 MMOL/L (ref 20–29)
CREAT SERPL-MCNC: 1.1 MG/DL (ref 0.76–1.27)
EOSINOPHIL # BLD AUTO: 0.3 X10E3/UL (ref 0–0.4)
EOSINOPHIL NFR BLD AUTO: 4 %
ERYTHROCYTE [DISTWIDTH] IN BLOOD BY AUTOMATED COUNT: 14.3 % (ref 11.6–15.4)
GLOBULIN SER CALC-MCNC: 2.3 G/DL (ref 1.5–4.5)
GLUCOSE SERPL-MCNC: 141 MG/DL (ref 65–99)
HBA1C MFR BLD: 11.1 % (ref 4.8–5.6)
HCT VFR BLD AUTO: 44.2 % (ref 37.5–51)
HDLC SERPL-MCNC: 38 MG/DL
HGB BLD-MCNC: 14 G/DL (ref 13–17.7)
HIV 1+2 AB+HIV1 P24 AG SERPL QL IA: NON REACTIVE
IMM GRANULOCYTES # BLD AUTO: 0 X10E3/UL (ref 0–0.1)
IMM GRANULOCYTES NFR BLD AUTO: 0 %
LDLC SERPL CALC-MCNC: 34 MG/DL (ref 0–99)
LYMPHOCYTES # BLD AUTO: 2.3 X10E3/UL (ref 0.7–3.1)
LYMPHOCYTES NFR BLD AUTO: 29 %
MCH RBC QN AUTO: 27.8 PG (ref 26.6–33)
MCHC RBC AUTO-ENTMCNC: 31.7 G/DL (ref 31.5–35.7)
MCV RBC AUTO: 88 FL (ref 79–97)
MONOCYTES # BLD AUTO: 0.6 X10E3/UL (ref 0.1–0.9)
MONOCYTES NFR BLD AUTO: 7 %
N GONORRHOEA RRNA SPEC QL NAA+PROBE: NEGATIVE
NEUTROPHILS # BLD AUTO: 4.6 X10E3/UL (ref 1.4–7)
NEUTROPHILS NFR BLD AUTO: 59 %
PLATELET # BLD AUTO: 309 X10E3/UL (ref 150–450)
POTASSIUM SERPL-SCNC: 5 MMOL/L (ref 3.5–5.2)
PROT SERPL-MCNC: 6.4 G/DL (ref 6–8.5)
RBC # BLD AUTO: 5.03 X10E6/UL (ref 4.14–5.8)
RPR SER QL: NON REACTIVE
SODIUM SERPL-SCNC: 141 MMOL/L (ref 134–144)
TRIGL SERPL-MCNC: 203 MG/DL (ref 0–149)
VLDLC SERPL CALC-MCNC: 32 MG/DL (ref 5–40)
WBC # BLD AUTO: 7.9 X10E3/UL (ref 3.4–10.8)

## 2021-09-27 ENCOUNTER — TELEPHONE (OUTPATIENT)
Dept: FAMILY MEDICINE | Facility: CLINIC | Age: 61
End: 2021-09-27

## 2021-10-04 ENCOUNTER — PATIENT MESSAGE (OUTPATIENT)
Dept: ADMINISTRATIVE | Facility: HOSPITAL | Age: 61
End: 2021-10-04

## 2021-10-05 ENCOUNTER — PATIENT MESSAGE (OUTPATIENT)
Dept: FAMILY MEDICINE | Facility: CLINIC | Age: 61
End: 2021-10-05

## 2021-10-12 DIAGNOSIS — E78.5 HYPERLIPIDEMIA, UNSPECIFIED HYPERLIPIDEMIA TYPE: ICD-10-CM

## 2021-10-13 RX ORDER — ROSUVASTATIN CALCIUM 10 MG/1
TABLET, COATED ORAL
Qty: 90 TABLET | Refills: 3 | OUTPATIENT
Start: 2021-10-13

## 2021-10-18 ENCOUNTER — PATIENT MESSAGE (OUTPATIENT)
Dept: ADMINISTRATIVE | Facility: HOSPITAL | Age: 61
End: 2021-10-18
Payer: COMMERCIAL

## 2021-10-20 ENCOUNTER — OFFICE VISIT (OUTPATIENT)
Dept: ENDOCRINOLOGY | Facility: CLINIC | Age: 61
End: 2021-10-20
Payer: COMMERCIAL

## 2021-10-20 ENCOUNTER — PATIENT OUTREACH (OUTPATIENT)
Dept: ENDOCRINOLOGY | Facility: CLINIC | Age: 61
End: 2021-10-20

## 2021-10-20 VITALS
OXYGEN SATURATION: 98 % | HEIGHT: 65 IN | DIASTOLIC BLOOD PRESSURE: 81 MMHG | WEIGHT: 198.94 LBS | BODY MASS INDEX: 33.15 KG/M2 | SYSTOLIC BLOOD PRESSURE: 132 MMHG | HEART RATE: 92 BPM

## 2021-10-20 DIAGNOSIS — Z96.41 INSULIN PUMP IN PLACE: ICD-10-CM

## 2021-10-20 DIAGNOSIS — E78.00 PURE HYPERCHOLESTEROLEMIA: ICD-10-CM

## 2021-10-20 PROCEDURE — 99214 OFFICE O/P EST MOD 30 MIN: CPT | Mod: S$GLB,,, | Performed by: INTERNAL MEDICINE

## 2021-10-20 PROCEDURE — 95251 PR GLUCOSE MONITOR, 72 HOUR, PHYS INTERP: ICD-10-PCS | Mod: S$GLB,,, | Performed by: INTERNAL MEDICINE

## 2021-10-20 PROCEDURE — 99999 PR PBB SHADOW E&M-EST. PATIENT-LVL V: ICD-10-PCS | Mod: PBBFAC,,, | Performed by: STUDENT IN AN ORGANIZED HEALTH CARE EDUCATION/TRAINING PROGRAM

## 2021-10-20 PROCEDURE — 99999 PR PBB SHADOW E&M-EST. PATIENT-LVL V: CPT | Mod: PBBFAC,,, | Performed by: STUDENT IN AN ORGANIZED HEALTH CARE EDUCATION/TRAINING PROGRAM

## 2021-10-20 PROCEDURE — 99214 PR OFFICE/OUTPT VISIT, EST, LEVL IV, 30-39 MIN: ICD-10-PCS | Mod: S$GLB,,, | Performed by: INTERNAL MEDICINE

## 2021-10-20 PROCEDURE — 95251 CONT GLUC MNTR ANALYSIS I&R: CPT | Mod: S$GLB,,, | Performed by: INTERNAL MEDICINE

## 2021-10-25 ENCOUNTER — TELEPHONE (OUTPATIENT)
Dept: OPTOMETRY | Facility: CLINIC | Age: 61
End: 2021-10-25
Payer: COMMERCIAL

## 2021-11-03 ENCOUNTER — PATIENT MESSAGE (OUTPATIENT)
Dept: FAMILY MEDICINE | Facility: CLINIC | Age: 61
End: 2021-11-03
Payer: COMMERCIAL

## 2021-11-03 RX ORDER — ROSUVASTATIN CALCIUM 5 MG/1
5 TABLET, COATED ORAL DAILY
Qty: 90 TABLET | Refills: 3 | Status: SHIPPED | OUTPATIENT
Start: 2021-11-03 | End: 2022-10-10

## 2021-11-08 ENCOUNTER — PATIENT OUTREACH (OUTPATIENT)
Dept: ADMINISTRATIVE | Facility: OTHER | Age: 61
End: 2021-11-08
Payer: COMMERCIAL

## 2021-11-10 ENCOUNTER — OFFICE VISIT (OUTPATIENT)
Dept: OPTOMETRY | Facility: CLINIC | Age: 61
End: 2021-11-10
Payer: COMMERCIAL

## 2021-11-10 DIAGNOSIS — E11.3293 MILD NONPROLIFERATIVE DIABETIC RETINOPATHY OF BOTH EYES WITHOUT MACULAR EDEMA ASSOCIATED WITH TYPE 2 DIABETES MELLITUS: Primary | ICD-10-CM

## 2021-11-10 DIAGNOSIS — H52.03 HYPEROPIA WITH PRESBYOPIA, BILATERAL: ICD-10-CM

## 2021-11-10 DIAGNOSIS — H25.13 NUCLEAR SCLEROSIS, BILATERAL: ICD-10-CM

## 2021-11-10 DIAGNOSIS — H52.4 HYPEROPIA WITH PRESBYOPIA, BILATERAL: ICD-10-CM

## 2021-11-10 PROCEDURE — 1159F MED LIST DOCD IN RCRD: CPT | Mod: CPTII,S$GLB,, | Performed by: OPTOMETRIST

## 2021-11-10 PROCEDURE — 3060F POS MICROALBUMINURIA REV: CPT | Mod: CPTII,S$GLB,, | Performed by: OPTOMETRIST

## 2021-11-10 PROCEDURE — 1159F PR MEDICATION LIST DOCUMENTED IN MEDICAL RECORD: ICD-10-PCS | Mod: CPTII,S$GLB,, | Performed by: OPTOMETRIST

## 2021-11-10 PROCEDURE — 92014 COMPRE OPH EXAM EST PT 1/>: CPT | Mod: S$GLB,,, | Performed by: OPTOMETRIST

## 2021-11-10 PROCEDURE — 92015 DETERMINE REFRACTIVE STATE: CPT | Mod: S$GLB,,, | Performed by: OPTOMETRIST

## 2021-11-10 PROCEDURE — 4010F PR ACE/ARB THEARPY RXD/TAKEN: ICD-10-PCS | Mod: CPTII,S$GLB,, | Performed by: OPTOMETRIST

## 2021-11-10 PROCEDURE — 1160F RVW MEDS BY RX/DR IN RCRD: CPT | Mod: CPTII,S$GLB,, | Performed by: OPTOMETRIST

## 2021-11-10 PROCEDURE — 3066F NEPHROPATHY DOC TX: CPT | Mod: CPTII,S$GLB,, | Performed by: OPTOMETRIST

## 2021-11-10 PROCEDURE — 92015 PR REFRACTION: ICD-10-PCS | Mod: S$GLB,,, | Performed by: OPTOMETRIST

## 2021-11-10 PROCEDURE — 99999 PR PBB SHADOW E&M-EST. PATIENT-LVL IV: ICD-10-PCS | Mod: PBBFAC,,, | Performed by: OPTOMETRIST

## 2021-11-10 PROCEDURE — 1160F PR REVIEW ALL MEDS BY PRESCRIBER/CLIN PHARMACIST DOCUMENTED: ICD-10-PCS | Mod: CPTII,S$GLB,, | Performed by: OPTOMETRIST

## 2021-11-10 PROCEDURE — 99999 PR PBB SHADOW E&M-EST. PATIENT-LVL IV: CPT | Mod: PBBFAC,,, | Performed by: OPTOMETRIST

## 2021-11-10 PROCEDURE — 3046F PR MOST RECENT HEMOGLOBIN A1C LEVEL > 9.0%: ICD-10-PCS | Mod: CPTII,S$GLB,, | Performed by: OPTOMETRIST

## 2021-11-10 PROCEDURE — 3060F PR POS MICROALBUMINURIA RESULT DOCUMENTED/REVIEW: ICD-10-PCS | Mod: CPTII,S$GLB,, | Performed by: OPTOMETRIST

## 2021-11-10 PROCEDURE — 4010F ACE/ARB THERAPY RXD/TAKEN: CPT | Mod: CPTII,S$GLB,, | Performed by: OPTOMETRIST

## 2021-11-10 PROCEDURE — 3066F PR DOCUMENTATION OF TREATMENT FOR NEPHROPATHY: ICD-10-PCS | Mod: CPTII,S$GLB,, | Performed by: OPTOMETRIST

## 2021-11-10 PROCEDURE — 3046F HEMOGLOBIN A1C LEVEL >9.0%: CPT | Mod: CPTII,S$GLB,, | Performed by: OPTOMETRIST

## 2021-11-10 PROCEDURE — 92014 PR EYE EXAM, EST PATIENT,COMPREHESV: ICD-10-PCS | Mod: S$GLB,,, | Performed by: OPTOMETRIST

## 2021-11-18 ENCOUNTER — PATIENT MESSAGE (OUTPATIENT)
Dept: FAMILY MEDICINE | Facility: CLINIC | Age: 61
End: 2021-11-18
Payer: COMMERCIAL

## 2021-11-18 DIAGNOSIS — G89.4 CHRONIC PAIN SYNDROME: ICD-10-CM

## 2021-11-18 DIAGNOSIS — F32.A DEPRESSION, UNSPECIFIED DEPRESSION TYPE: ICD-10-CM

## 2021-11-18 DIAGNOSIS — I10 HYPERTENSION, UNCONTROLLED: ICD-10-CM

## 2021-11-18 DIAGNOSIS — Z20.6 EXPOSURE TO HIV: ICD-10-CM

## 2021-11-18 DIAGNOSIS — E11.42 DIABETIC PERIPHERAL NEUROPATHY ASSOCIATED WITH TYPE 2 DIABETES MELLITUS: ICD-10-CM

## 2021-11-18 RX ORDER — LOSARTAN POTASSIUM 50 MG/1
TABLET ORAL
Qty: 90 TABLET | Refills: 0 | Status: SHIPPED | OUTPATIENT
Start: 2021-11-18 | End: 2021-12-28 | Stop reason: SDUPTHER

## 2021-11-18 RX ORDER — GABAPENTIN 100 MG/1
CAPSULE ORAL
Qty: 120 CAPSULE | Refills: 0 | Status: SHIPPED | OUTPATIENT
Start: 2021-11-18 | End: 2021-12-15

## 2021-11-18 RX ORDER — EMTRICITABINE AND TENOFOVIR DISOPROXIL FUMARATE 200; 300 MG/1; MG/1
1 TABLET, FILM COATED ORAL DAILY
Qty: 30 TABLET | Refills: 0 | Status: SHIPPED | OUTPATIENT
Start: 2021-11-18 | End: 2021-12-27 | Stop reason: SDUPTHER

## 2021-11-18 RX ORDER — DULOXETIN HYDROCHLORIDE 60 MG/1
60 CAPSULE, DELAYED RELEASE ORAL DAILY
Qty: 90 CAPSULE | Refills: 0 | Status: SHIPPED | OUTPATIENT
Start: 2021-11-18 | End: 2021-12-27 | Stop reason: SDUPTHER

## 2021-12-15 DIAGNOSIS — E11.42 DIABETIC PERIPHERAL NEUROPATHY ASSOCIATED WITH TYPE 2 DIABETES MELLITUS: ICD-10-CM

## 2021-12-15 RX ORDER — GABAPENTIN 100 MG/1
CAPSULE ORAL
Qty: 120 CAPSULE | Refills: 0 | Status: SHIPPED | OUTPATIENT
Start: 2021-12-15 | End: 2021-12-27 | Stop reason: SDUPTHER

## 2022-01-04 RX ORDER — INSULIN LISPRO 100 [IU]/ML
INJECTION, SOLUTION INTRAVENOUS; SUBCUTANEOUS
Qty: 10 ML | Refills: 11 | Status: SHIPPED | OUTPATIENT
Start: 2022-01-04 | End: 2022-01-20 | Stop reason: SDUPTHER

## 2022-01-12 DIAGNOSIS — E11.9 TYPE 2 DIABETES MELLITUS WITHOUT COMPLICATION: ICD-10-CM

## 2022-01-20 ENCOUNTER — PATIENT MESSAGE (OUTPATIENT)
Dept: ENDOCRINOLOGY | Facility: CLINIC | Age: 62
End: 2022-01-20
Payer: COMMERCIAL

## 2022-01-20 DIAGNOSIS — G89.4 CHRONIC PAIN SYNDROME: ICD-10-CM

## 2022-01-20 DIAGNOSIS — F32.A DEPRESSION, UNSPECIFIED DEPRESSION TYPE: ICD-10-CM

## 2022-01-20 RX ORDER — DULOXETIN HYDROCHLORIDE 60 MG/1
60 CAPSULE, DELAYED RELEASE ORAL DAILY
Qty: 90 CAPSULE | Refills: 0 | Status: SHIPPED | OUTPATIENT
Start: 2022-01-20 | End: 2022-06-29 | Stop reason: SDUPTHER

## 2022-01-20 NOTE — TELEPHONE ENCOUNTER
No new care gaps identified.  Powered by [employer]. Reference number: [id number].   1/20/2022 9:44:06 AM CST

## 2022-01-21 ENCOUNTER — PATIENT MESSAGE (OUTPATIENT)
Dept: FAMILY MEDICINE | Facility: CLINIC | Age: 62
End: 2022-01-21
Payer: COMMERCIAL

## 2022-01-21 ENCOUNTER — TELEPHONE (OUTPATIENT)
Dept: ENDOCRINOLOGY | Facility: CLINIC | Age: 62
End: 2022-01-21
Payer: COMMERCIAL

## 2022-01-21 DIAGNOSIS — Z12.5 PROSTATE CANCER SCREENING: ICD-10-CM

## 2022-01-21 DIAGNOSIS — Z72.51 HIGH RISK SEXUAL BEHAVIOR, UNSPECIFIED TYPE: Primary | ICD-10-CM

## 2022-01-21 DIAGNOSIS — I10 HYPERTENSION, WELL CONTROLLED: ICD-10-CM

## 2022-01-21 DIAGNOSIS — E78.00 PURE HYPERCHOLESTEROLEMIA: ICD-10-CM

## 2022-01-21 RX ORDER — INSULIN LISPRO 100 [IU]/ML
INJECTION, SOLUTION INTRAVENOUS; SUBCUTANEOUS
Qty: 10 ML | Refills: 11 | Status: SHIPPED | OUTPATIENT
Start: 2022-01-21 | End: 2023-02-01 | Stop reason: SDUPTHER

## 2022-01-21 NOTE — TELEPHONE ENCOUNTER
----- Message from ChaddRosario Cueto sent at 1/21/2022  9:45 AM CST -----  Regarding: Call  Contact: Patient  Type: Patient Call Back    Who called:Patient    What is the request in detail: Patient is requesting a call back. He needs labs requesting sent to [facility]. Please advise.    Can the clinic reply by [facility]? No    Would the patient rather a call back or a response via [facility]? Call    Best call back number: [phone]    Additional Information:    Thanks

## 2022-01-29 LAB
ALBUMIN SERPL-MCNC: 4 G/DL (ref 3.8–4.8)
ALBUMIN/GLOB SERPL: 1.8 {RATIO} (ref 1.2–2.2)
ALP SERPL-CCNC: 95 IU/L (ref 44–121)
ALT SERPL-CCNC: 43 IU/L (ref 0–44)
AST SERPL-CCNC: 41 IU/L (ref 0–40)
BILIRUB SERPL-MCNC: <0.2 MG/DL (ref 0–1.2)
BUN SERPL-MCNC: 14 MG/DL (ref 8–27)
BUN/CREAT SERPL: 12 (ref 10–24)
CALCIUM SERPL-MCNC: 9.1 MG/DL (ref 8.6–10.2)
CHLORIDE SERPL-SCNC: 102 MMOL/L (ref 96–106)
CO2 SERPL-SCNC: 23 MMOL/L (ref 20–29)
CREAT SERPL-MCNC: 1.16 MG/DL (ref 0.76–1.27)
GLOBULIN SER CALC-MCNC: 2.2 G/DL (ref 1.5–4.5)
GLUCOSE SERPL-MCNC: 177 MG/DL (ref 65–99)
HBA1C MFR BLD: 10.9 % (ref 4.8–5.6)
HIV 1+2 AB+HIV1 P24 AG SERPL QL IA: NON REACTIVE
POTASSIUM SERPL-SCNC: 4.6 MMOL/L (ref 3.5–5.2)
PROT SERPL-MCNC: 6.2 G/DL (ref 6–8.5)
PSA SERPL-MCNC: 0.3 NG/ML (ref 0–4)
RPR SER QL: NON REACTIVE
SODIUM SERPL-SCNC: 137 MMOL/L (ref 134–144)

## 2022-02-09 ENCOUNTER — OFFICE VISIT (OUTPATIENT)
Dept: ENDOCRINOLOGY | Facility: CLINIC | Age: 62
End: 2022-02-09
Payer: COMMERCIAL

## 2022-02-09 ENCOUNTER — PATIENT OUTREACH (OUTPATIENT)
Dept: DIABETES | Facility: CLINIC | Age: 62
End: 2022-02-09
Payer: COMMERCIAL

## 2022-02-09 VITALS
SYSTOLIC BLOOD PRESSURE: 124 MMHG | WEIGHT: 199.75 LBS | HEIGHT: 65 IN | RESPIRATION RATE: 18 BRPM | HEART RATE: 100 BPM | BODY MASS INDEX: 33.28 KG/M2 | OXYGEN SATURATION: 95 % | DIASTOLIC BLOOD PRESSURE: 70 MMHG

## 2022-02-09 DIAGNOSIS — I10 HYPERTENSION, WELL CONTROLLED: ICD-10-CM

## 2022-02-09 DIAGNOSIS — Z96.41 INSULIN PUMP IN PLACE: ICD-10-CM

## 2022-02-09 DIAGNOSIS — E78.00 PURE HYPERCHOLESTEROLEMIA: ICD-10-CM

## 2022-02-09 PROCEDURE — 99999 PR PBB SHADOW E&M-EST. PATIENT-LVL V: CPT | Mod: PBBFAC,,,

## 2022-02-09 PROCEDURE — 95251 CONT GLUC MNTR ANALYSIS I&R: CPT | Mod: S$GLB,,, | Performed by: INTERNAL MEDICINE

## 2022-02-09 PROCEDURE — 95251 PR GLUCOSE MONITOR, 72 HOUR, PHYS INTERP: ICD-10-PCS | Mod: S$GLB,,, | Performed by: INTERNAL MEDICINE

## 2022-02-09 PROCEDURE — 99214 OFFICE O/P EST MOD 30 MIN: CPT | Mod: 25,S$GLB,, | Performed by: INTERNAL MEDICINE

## 2022-02-09 PROCEDURE — 99999 PR PBB SHADOW E&M-EST. PATIENT-LVL V: ICD-10-PCS | Mod: PBBFAC,,,

## 2022-02-09 PROCEDURE — 99214 PR OFFICE/OUTPT VISIT, EST, LEVL IV, 30-39 MIN: ICD-10-PCS | Mod: 25,S$GLB,, | Performed by: INTERNAL MEDICINE

## 2022-02-09 RX ORDER — KETOROLAC TROMETHAMINE 5 MG/ML
SOLUTION OPHTHALMIC
COMMUNITY
Start: 2022-01-19 | End: 2022-05-11

## 2022-02-09 NOTE — PROGRESS NOTES
Pump Clinic Return Visit    Chief Complaint   Patient presents with    Diabetes     Type 2        HPI:Cortes Schroeder is a 62 y.o. male who was diagnosed with type 2 DM in 1995 who is following up for pump management.       Interval History:    Last visit:  10/2021  Today, having occasional lows when bolusing, overnight hyperglycemia after dinner in 300s, Dexcom is currently not linked to Omnipod.  There is some mild improvement in comparison to January glucose control.  Down trending glucose overnight , occ postprandial lunch hypoglycemia, breakfast bolus adequate with no postprandial hypoglycemia.  Occasionally missing some prandial bolus with significant postprandial excursion after dinner.  Rarely correcting at bedtime resulting in overnight hyperglycemia.  Infrequently correction at bedtime his often times insufficient.  Total daily dose 80 units, basal 60%, roughly 25 carbs with 2.3 entries daily.  Poor time in range 31%, appears to be mostly Behavioral  No longer at High security area as much.  Denies any lows at work, occasionally snacking at bedtime, endorses forgetfulness and no correction with bedtime due to fearing this overnight lows.  He is hypoglycemia aware and has glucagon.                   Current diabetes regimen:    Pump:  Omnipod/Dexcom  On current pump since 2015                      Lab Results   Component Value Date    LABA1C 10.8 (H) 08/15/2016    HGBA1C 10.9 (H) 01/28/2022     Glucose Monitoring:  Meter/CGM:  Dexcom G6          Hypoglycemic Episodes:  None  Since last visit no severe hypoglycemic episodes requiring observer intervention. There were a couple of readings around 66 after dinner     Diet/Exercise:   Eats usually 2 meals a day.     Trying to cut down on fast food.    No exercise due to back pain    BF: sausage biscuit 7a-8a - 25-30 g carbs, food bars (28 g carbs)    L: skips mostly    D: 45-60 or more carbs, variable (hamburger, poboy), sandwiches, salads    Snack:  "occ.    Backup Long-acting Insulin:  yes    Screening / DM Complications:  Neuropathy yes  Last foot exam : 07/21/2021  Last eye exam : 11/10/2021;  no laser surgery or DR, scheduled appointment next month  CVD/MI: none    A complete review of systems was conducted and negative except for stated above    Vital Signs  /70 (BP Location: Right arm, Patient Position: Sitting, BP Method: Medium (Manual))   Pulse 100   Resp 18   Ht 5' 5" (1.651 m)   Wt 90.6 kg (199 lb 11.8 oz)   SpO2 95%   BMI 33.24 kg/m²     Physical Exam  Constitutional:       Appearance: Normal appearance. He is obese.   Eyes:      Pupils: Pupils are equal, round, and reactive to light.   Cardiovascular:      Rate and Rhythm: Normal rate and regular rhythm.      Pulses: Normal pulses.      Heart sounds: Normal heart sounds.   Pulmonary:      Effort: Pulmonary effort is normal.      Breath sounds: Normal breath sounds.   Abdominal:      Palpations: Abdomen is soft. No injection/pump site abnormalities  Musculoskeletal:         General: Normal range of motion.   Skin:     General: Skin is warm.   Neurological:      General: No focal deficit present.      Mental Status: He is alert.   Psychiatric:         Behavior: Behavior normal.       Nephropathy:  ACEi/ARB: Taking    Lab Results   Component Value Date    MICALBCREAT 114.6 (H) 04/14/2021     Lab Results   Component Value Date    CREATININE 1.16 01/28/2022     Last Lipid Panel:  Statin: Taking    Lab Results   Component Value Date    LDLCALC 34 09/23/2021     Neuropathy:  Last foot exam : 07/21/2021  Last eye exam : 11/10/2021;  no laser surgery or DR    Lab Results   Component Value Date    TSH 2.258 10/30/2016     No results found for: TTGIGA      Assessment/Plan    Uncontrolled type 2 diabetes mellitus with diabetic polyneuropathy, with long-term current use of insulin  -  10.9  01/28/2022     -  Foot exam performed w/i 1 year No abnormalities  -  Optometry seen w/i 1 year  No " retinopathy  -  CAESAR uptodate   On ACE or ARB  -  Lipids reviewed   On statin                 PLAN:   -  Due to 72hr CGM and pump review w/ evidence of: having occasional lows when bolusing, overnight hyperglycemia after dinner in 300s, Dexcom is currently not linked to Omnipod.  There is some mild improvement in comparison to January glucose control.  Down trending glucose overnight , occ postprandial lunch hypoglycemia, breakfast bolus adequate with no postprandial hypoglycemia.  Occasionally missing some prandial bolus with significant postprandial excursion after dinner.  Rarely correcting at bedtime resulting in overnight hyperglycemia.  Infrequently correction at bedtime his often times insufficient.  Total daily dose 80 units, basal 60%, roughly 25 carbs with 2.3 entries daily.  Poor time in range 31%, appears to be mostly Behavioral     --Unable to strengthen dinner carb ratio due to ICR at 1:2. Instructed to bolus with 20% (mulitply expected carb x 1.2) increase in carbs at dinner.  --Weakened 8:00 p.m., new setting, ISF to encourage correction bolus at night to avoid overnight hypoglycemia  --Decreased basal overnight and during day  --Recommended setting alarm at 9:00 p.m. to administer correction bolus as patient endorses frequent forgetfulness      Pump/CGM:  Omnipod/Dexcom    Basal Rate  MN: 1.8  U/hr (weakened - prior 2.0)  6A:  2.2  U/hr (weakened - prior 2.4)    Carb Ratio  MN:  4  10A: 4 (weakened - prior 3)  4P:  2    ISF  6A:  20  8P:  30 ( new time - weakened - prior 20)         -  Diabetic supplies and medications: reviewed  -  I personally reviewed CGM/PUMP data     Supplies/Refills:  -  No need for refills at this time    Labs Ordered:  -  None      Insulin pump in place  -Omnipod / Dexcom  -Device working, no issues, has supplies  -Change site every 3 days      Hyperlipidemia  Lipids reviewed, LDL at goal on statin therapy    Hypertension, well controlled  Continue antihypertensive medication  regimen  124/70 today at goal      RTC in 3 months     Edgardo Thompson DO  Endocrinology Fellow  [facility], 6th Floor  [address] [place], [place], [address]    Office: [phone]  Fax: [phone]       Disclaimer: This note has been generated using voice-recognition software. There may be typographical errors that have been missed during proof-reading.    The above history labs imaging impression and plan were discussed with attending physician who is in agreement and also took part in this patient's care.  I personally reviewed all of the patients available medications, labs, imaging, vitals, allergies, medical history.

## 2022-02-09 NOTE — PROGRESS NOTES
I have reviewed and concur with Dr. Thompson's history, physical, assessment, and plan.  I have personally interviewed and examined the patient.  See below addendum for my evaluation and additional findings.    Having significant hyperglycemia in the evening still, occasionally forgetting evening insulin and sometimes snacking at night.  Also still with overnight downward drift in BG overnight, still on too much basal insulin.  Patient is concerned about night time correction boluses causing hypoglycemia so to encourage him to give correct at night will back off on ISF at night.  He will set an alarm on his phone to remind him to give correction around bedtime nightly.      Maynor Zaragoza MD

## 2022-02-09 NOTE — ASSESSMENT & PLAN NOTE
-  10.9  01/28/2022     -  Foot exam performed w/i 1 year No abnormalities  -  Optometry seen w/i 1 year  No retinopathy  -  CAESAR uptodate   On ACE or ARB  -  Lipids reviewed   On statin                 PLAN:   -  Due to 72hr CGM and pump review w/ evidence of: having occasional lows when bolusing, overnight hyperglycemia after dinner in 300s, Dexcom is currently not linked to Omnipod.  There is some mild improvement in comparison to January glucose control.  Down trending glucose overnight , occ postprandial lunch hypoglycemia, breakfast bolus adequate with no postprandial hypoglycemia.  Occasionally missing some prandial bolus with significant postprandial excursion after dinner.  Rarely correcting at bedtime resulting in overnight hyperglycemia.  Infrequently correction at bedtime his often times insufficient.  Total daily dose 80 units, basal 60%, roughly 25 carbs with 2.3 entries daily.  Poor time in range 31%, appears to be mostly Behavioral     --Unable to strengthen dinner carb ratio due to ICR at 1:2. Instructed to bolus with 20% (mulitply expected carb x 1.2) increase in carbs at dinner.  --Weakened 8:00 p.m., new setting, ISF to encourage correction bolus at night to avoid overnight hypoglycemia  --Decreased basal overnight and during day  --Recommended setting alarm at 9:00 p.m. to administer correction bolus as patient endorses frequent forgetfulness      Pump/CGM:  Omnipod/Dexcom    Basal Rate  MN: 1.8  U/hr (weakened - prior 2.0)  6A:  2.2  U/hr (weakened - prior 2.4)    Carb Ratio  MN:  4  10A: 4 (weakened - prior 3)  4P:  2    ISF  6A:  20  8P:  30 ( new time - weakened - prior 20)         -  Diabetic supplies and medications: reviewed  -  I personally reviewed CGM/PUMP data     Supplies/Refills:  -  No need for refills at this time    Labs Ordered:  -  None

## 2022-02-09 NOTE — PATIENT INSTRUCTIONS
Your email address is already linked to a [employer] account so please login (reset your password if needed) and link to our clinic account.  From your personal [employer] account you will also need to link your dexcom account by entering in your clarity user name and password.      You need to link your dexcom account to your [employer] account so that you can view your dexcom and omnipod data on the same report. The directions can be found here:    [email or web address]    [employer] [facility] - [email or web address]

## 2022-03-04 ENCOUNTER — PATIENT MESSAGE (OUTPATIENT)
Dept: FAMILY MEDICINE | Facility: CLINIC | Age: 62
End: 2022-03-04
Payer: COMMERCIAL

## 2022-03-04 ENCOUNTER — PATIENT MESSAGE (OUTPATIENT)
Dept: ENDOCRINOLOGY | Facility: CLINIC | Age: 62
End: 2022-03-04
Payer: COMMERCIAL

## 2022-03-04 DIAGNOSIS — E11.65 UNCONTROLLED TYPE 2 DIABETES MELLITUS WITH HYPERGLYCEMIA, WITH LONG-TERM CURRENT USE OF INSULIN: ICD-10-CM

## 2022-03-04 DIAGNOSIS — Z79.4 UNCONTROLLED TYPE 2 DIABETES MELLITUS WITH HYPERGLYCEMIA, WITH LONG-TERM CURRENT USE OF INSULIN: ICD-10-CM

## 2022-03-04 RX ORDER — METFORMIN HYDROCHLORIDE 1000 MG/1
TABLET ORAL
Qty: 180 TABLET | Refills: 2 | Status: SHIPPED | OUTPATIENT
Start: 2022-03-04 | End: 2022-05-11

## 2022-03-05 ENCOUNTER — PATIENT MESSAGE (OUTPATIENT)
Dept: FAMILY MEDICINE | Facility: CLINIC | Age: 62
End: 2022-03-05
Payer: COMMERCIAL

## 2022-03-10 DIAGNOSIS — I10 HYPERTENSION, UNCONTROLLED: ICD-10-CM

## 2022-03-10 NOTE — TELEPHONE ENCOUNTER
No new care gaps identified.  Powered by [employer]. Reference number: [id number].   3/10/2022 5:28:06 AM CST

## 2022-03-15 ENCOUNTER — TELEPHONE (OUTPATIENT)
Dept: FAMILY MEDICINE | Facility: CLINIC | Age: 62
End: 2022-03-15
Payer: COMMERCIAL

## 2022-03-16 ENCOUNTER — OFFICE VISIT (OUTPATIENT)
Dept: FAMILY MEDICINE | Facility: CLINIC | Age: 62
End: 2022-03-16
Payer: COMMERCIAL

## 2022-03-16 VITALS
TEMPERATURE: 98 F | RESPIRATION RATE: 20 BRPM | WEIGHT: 199.94 LBS | DIASTOLIC BLOOD PRESSURE: 80 MMHG | BODY MASS INDEX: 33.31 KG/M2 | HEART RATE: 82 BPM | SYSTOLIC BLOOD PRESSURE: 130 MMHG | HEIGHT: 65 IN | OXYGEN SATURATION: 97 %

## 2022-03-16 DIAGNOSIS — E11.3293 MILD NONPROLIFERATIVE DIABETIC RETINOPATHY OF BOTH EYES WITHOUT MACULAR EDEMA ASSOCIATED WITH TYPE 2 DIABETES MELLITUS: ICD-10-CM

## 2022-03-16 DIAGNOSIS — Z72.52 HIGH RISK HOMOSEXUAL BEHAVIOR: ICD-10-CM

## 2022-03-16 DIAGNOSIS — E66.01 MORBID OBESITY: ICD-10-CM

## 2022-03-16 DIAGNOSIS — Z23 NEED FOR TDAP VACCINATION: Primary | ICD-10-CM

## 2022-03-16 PROCEDURE — 99999 PR PBB SHADOW E&M-EST. PATIENT-LVL V: CPT | Mod: PBBFAC,,, | Performed by: FAMILY MEDICINE

## 2022-03-16 PROCEDURE — 99214 PR OFFICE/OUTPT VISIT, EST, LEVL IV, 30-39 MIN: ICD-10-PCS | Mod: 25,S$GLB,, | Performed by: FAMILY MEDICINE

## 2022-03-16 PROCEDURE — 4010F ACE/ARB THERAPY RXD/TAKEN: CPT | Mod: CPTII,S$GLB,, | Performed by: FAMILY MEDICINE

## 2022-03-16 PROCEDURE — 3008F PR BODY MASS INDEX (BMI) DOCUMENTED: ICD-10-PCS | Mod: CPTII,S$GLB,, | Performed by: FAMILY MEDICINE

## 2022-03-16 PROCEDURE — 3079F DIAST BP 80-89 MM HG: CPT | Mod: CPTII,S$GLB,, | Performed by: FAMILY MEDICINE

## 2022-03-16 PROCEDURE — 99999 PR PBB SHADOW E&M-EST. PATIENT-LVL V: ICD-10-PCS | Mod: PBBFAC,,, | Performed by: FAMILY MEDICINE

## 2022-03-16 PROCEDURE — 3072F PR LOW RISK FOR RETINOPATHY: ICD-10-PCS | Mod: CPTII,S$GLB,, | Performed by: FAMILY MEDICINE

## 2022-03-16 PROCEDURE — 3079F PR MOST RECENT DIASTOLIC BLOOD PRESSURE 80-89 MM HG: ICD-10-PCS | Mod: CPTII,S$GLB,, | Performed by: FAMILY MEDICINE

## 2022-03-16 PROCEDURE — 1160F PR REVIEW ALL MEDS BY PRESCRIBER/CLIN PHARMACIST DOCUMENTED: ICD-10-PCS | Mod: CPTII,S$GLB,, | Performed by: FAMILY MEDICINE

## 2022-03-16 PROCEDURE — 4010F PR ACE/ARB THEARPY RXD/TAKEN: ICD-10-PCS | Mod: CPTII,S$GLB,, | Performed by: FAMILY MEDICINE

## 2022-03-16 PROCEDURE — 3072F LOW RISK FOR RETINOPATHY: CPT | Mod: CPTII,S$GLB,, | Performed by: FAMILY MEDICINE

## 2022-03-16 PROCEDURE — 3075F PR MOST RECENT SYSTOLIC BLOOD PRESS GE 130-139MM HG: ICD-10-PCS | Mod: CPTII,S$GLB,, | Performed by: FAMILY MEDICINE

## 2022-03-16 PROCEDURE — 99214 OFFICE O/P EST MOD 30 MIN: CPT | Mod: 25,S$GLB,, | Performed by: FAMILY MEDICINE

## 2022-03-16 PROCEDURE — 90715 TDAP VACCINE 7 YRS/> IM: CPT | Mod: S$GLB,,, | Performed by: FAMILY MEDICINE

## 2022-03-16 PROCEDURE — 1160F RVW MEDS BY RX/DR IN RCRD: CPT | Mod: CPTII,S$GLB,, | Performed by: FAMILY MEDICINE

## 2022-03-16 PROCEDURE — 3075F SYST BP GE 130 - 139MM HG: CPT | Mod: CPTII,S$GLB,, | Performed by: FAMILY MEDICINE

## 2022-03-16 PROCEDURE — 1159F PR MEDICATION LIST DOCUMENTED IN MEDICAL RECORD: ICD-10-PCS | Mod: CPTII,S$GLB,, | Performed by: FAMILY MEDICINE

## 2022-03-16 PROCEDURE — 90471 TDAP VACCINE GREATER THAN OR EQUAL TO 7YO IM: ICD-10-PCS | Mod: S$GLB,,, | Performed by: FAMILY MEDICINE

## 2022-03-16 PROCEDURE — 1159F MED LIST DOCD IN RCRD: CPT | Mod: CPTII,S$GLB,, | Performed by: FAMILY MEDICINE

## 2022-03-16 PROCEDURE — 90715 TDAP VACCINE GREATER THAN OR EQUAL TO 7YO IM: ICD-10-PCS | Mod: S$GLB,,, | Performed by: FAMILY MEDICINE

## 2022-03-16 PROCEDURE — 3008F BODY MASS INDEX DOCD: CPT | Mod: CPTII,S$GLB,, | Performed by: FAMILY MEDICINE

## 2022-03-16 PROCEDURE — 90471 IMMUNIZATION ADMIN: CPT | Mod: S$GLB,,, | Performed by: FAMILY MEDICINE

## 2022-03-16 RX ORDER — SEMAGLUTIDE 1.34 MG/ML
INJECTION, SOLUTION SUBCUTANEOUS
COMMUNITY
Start: 2022-03-07 | End: 2022-05-11

## 2022-03-16 RX ORDER — SEMAGLUTIDE 1.34 MG/ML
INJECTION, SOLUTION SUBCUTANEOUS
COMMUNITY
Start: 2021-12-28 | End: 2022-05-11 | Stop reason: SDUPTHER

## 2022-03-16 NOTE — PROGRESS NOTES
Patient given tdap injection. Tolerated well. VIS given & advised to wait in lobby 15 mins for monitoring. Verbalized understanding.

## 2022-03-16 NOTE — PROGRESS NOTES
Routine Office Visit    Patient Name: Cortes Schroeder    : 1960  MRN: [id number]    Subjective:  Cortes is a 62 y.o. male who presents today for:    1. PrEP  Patient presenting today for follow up of PrEP.  He has been taking medication as prescribed.  He has not had any side effects related to the medication.  He is not currently sexually active.  When he is, its the same partner.  They occasionally use condoms.  He states that blood sugars have been doing better.   No rashes, penile discharge, night sweats, or unexplained weight loss.  Last HIV test done on 2022 as well as CMP.  HIV was negative and no CKD      Past Medical History  Past Medical History:   Diagnosis Date    Anxiety 2012    Back pain 2012    Cataract     Chronic pain syndrome 2016    Diabetes type 2, controlled 2016    Diabetic retinopathy     DM (diabetes mellitus) 2012    DM (diabetes mellitus), type 2, uncontrolled 2013    Essential hypertension 2016    Gastroesophageal reflux disease without esophagitis 2016    Hyperlipidemia 2012    Insomnia 2014    Neuropathy 2013       Past Surgical History  Past Surgical History:   Procedure Laterality Date    BACK SURGERY      Lumbar Spine    CATARACT EXTRACTION      EPIDURAL STEROID INJECTION N/A 2019    Procedure: Injection, Steroid, Epidural Cervical;  Surgeon: Andrew Sinclair Jr., MD;  Location: [facility];  Service: Pain Management;  Laterality: N/A;  Cervical Epidural Steroid Injection C7-T1    10864    Arrive @ 1240    EPIDURAL STEROID INJECTION N/A 2019    Procedure: Injection, Steroid, Epidural;  Surgeon: Andrew Sinclair Jr., MD;  Location: [facility];  Service: Pain Management;  Laterality: N/A;  Lumbar Epidural Steroid Injection L4-5    53309    Arrive @ 1215 (requests latest time)       Family History  Family History   Problem Relation Age of Onset    Cataracts Father     Hypertension  Father     Parkinsonism Father     Alzheimer's disease Father     Migraines Mother     Hypertension Mother     Heart attack Mother     Amblyopia Neg Hx     Blindness Neg Hx     Glaucoma Neg Hx     Macular degeneration Neg Hx     Retinal detachment Neg Hx     Strabismus Neg Hx        Social History  Social History     Socioeconomic History    Marital status:    Tobacco Use    Smoking status: Never Smoker    Smokeless tobacco: Never Used   Substance and Sexual Activity    Alcohol use: Yes     Alcohol/week: 1.0 standard drink     Types: 1 Glasses of wine per week     Comment: occasionally    Drug use: No    Sexual activity: Not Currently     Partners: Male     Birth control/protection: Condom     Comment: 10/2/17      Social Determinants of Health     Financial Resource Strain: Low Risk     Difficulty of Paying Living Expenses: Not very hard   Food Insecurity: No Food Insecurity    Worried About Running Out of Food in the Last Year: Never true    Ran Out of Food in the Last Year: Never true   Transportation Needs: No Transportation Needs    Lack of Transportation (Medical): No    Lack of Transportation (Non-Medical): No   Physical Activity: Inactive    Days of Exercise per Week: 0 days    Minutes of Exercise per Session: 30 min   Stress: No Stress Concern Present    Feeling of Stress : Not at all   Social Connections: Unknown    Frequency of Communication with Friends and Family: More than three times a week    Frequency of Social Gatherings with Friends and Family: Once a week    Active Member of Clubs or Organizations: No    Attends Club or Organization Meetings: Never    Marital Status:    Housing Stability: Low Risk     Unable to Pay for Housing in the Last Year: No    Number of Places Lived in the Last Year: 2    Unstable Housing in the Last Year: No       Current Medications  Current Outpatient Medications on File Prior to Visit   Medication Sig Dispense Refill     DULoxetine (CYMBALTA) 60 MG capsule Take 1 capsule (60 mg total) by mouth once daily. 90 capsule 0    emtricitabine-tenofovir 200-300 mg (TRUVADA) 200-300 mg Tab TAKE 1 TABLET BY MOUTH EVERY DAY 30 tablet 0    fish oil-omega-3 fatty acids 300-1,000 mg capsule Take by mouth 2 (two) times daily.      gabapentin (NEURONTIN) 100 MG capsule TAKE 1 CAPSULE BY MOUTH EVERY MORNING AND 3 CAPSULES BY MOUTH EVERY EVENING 120 capsule 0    HUMALOG U-100 INSULIN 100 unit/mL injection Use in insulin pump; max dose 150 units per day. 10 mL 11    insulin pump cartridge (OMNIPOD DASH 5 PACK POD) Crtg Apply new pod to skin every 1.5 days 60 each 3    insulin pump controller (OMNIPOD DASH PDM KIT MISC) by Misc.(Non-Drug; Combo Route) route.      ketorolac 0.5% (ACULAR) 0.5 % Drop Place into both eyes.      lancets Misc To check BG 4-7 times daily, to use with insurance preferred meter 100 each 6    losartan (COZAAR) 50 MG tablet TAKE 1 TABLET(50 MG) BY MOUTH EVERY DAY 90 tablet 0    magnesium oxide-Mg AA chelate (MG-PLUS-PROTEIN) 133 mg Tab Take 500 mg by mouth every evening.       metFORMIN (GLUCOPHAGE) 1000 MG tablet TAKE 1 TABLET BY MOUTH TWICE DAILY WITH A MEAL 180 tablet 2    nortriptyline (PAMELOR) 50 MG capsule TAKE 1 CAPSULE(50 MG) BY MOUTH EVERY NIGHT 30 capsule 2    OPW TEST CLAIM - DO NOT FILL Inject into the skin. OPW test claim. Do not fill. 50 mL 0    rosuvastatin (CRESTOR) 5 MG tablet Take 1 tablet (5 mg total) by mouth once daily. 90 tablet 3    semaglutide (OZEMPIC) 1 mg/dose (2 mg/1.5 mL) PnIj Inject 1 mg into the skin every 7 days. 2 pen 11    vitamin D 1000 units Tab Take 185 mg by mouth 2 (two) times daily.       blood-glucose sensor (DEXCOM G6 SENSOR) [place] Change sensor every 10 days 3 each PRN    blood-glucose transmitter (DEXCOM G6 TRANSMITTER) [place]ira Change transmitter every 3 months 1 each PRN    OZEMPIC 0.25 mg or 0.5 mg(2 mg/1.5 mL) pen injector Inject into the skin.      OZEMPIC 1  "mg/dose (4 mg/3 mL) INJECT 1 MG INTO SKIN EVERY 7 DAYS      [DISCONTINUED] insulin aspart U-100 (NOVOLOG U-100 INSULIN ASPART) 100 unit/mL injection Use in insulin pump. Max daily dose 150 units. 50 mL 5     No current facility-administered medications on file prior to visit.       Allergies   Review of patient's allergies indicates:   Allergen Reactions    Invokana [canagliflozin] Anaphylaxis    Percocet [oxycodone-acetaminophen] Nausea Only and Hallucinations    Biaxin [clarithromycin]     Hydrocodone Other (See Comments)     Dizzy/nausea/hallucinations    Sulfa (sulfonamide antibiotics) Nausea Only and Rash       Review of Systems (Pertinent positives)  Review of Systems   Constitutional: Negative.    HENT: Negative.    Eyes: Negative for discharge.   Respiratory: Negative for wheezing.    Cardiovascular: Negative for chest pain and palpitations.   Gastrointestinal: Negative for blood in stool, constipation, diarrhea and vomiting.   Genitourinary: Negative for hematuria and urgency.   Musculoskeletal: Positive for joint pain and myalgias. Negative for neck pain.   Skin: Negative.    Neurological: Negative for weakness and headaches.   Endo/Heme/Allergies: Negative for polydipsia.         /80   Pulse 82   Temp 97.9 °F (36.6 °C) (Oral)   Resp 20   Ht 5' 5" (1.651 m)   Wt 90.7 kg (199 lb 15.3 oz)   SpO2 97%   BMI 33.27 kg/m²     GENERAL APPEARANCE: in no apparent distress and well developed and well nourished  HEENT: PERRL, EOMI, Sclera clear, anicteric, Oropharynx clear, no lesions, Neck supple with midline trachea  NECK: normal, supple, no adenopathy, thyroid normal in size  RESPIRATORY: appears well, vitals normal, no respiratory distress, acyanotic, normal RR, chest clear, no wheezing, crepitations, rhonchi, normal symmetric air entry  HEART: regular rate and rhythm, S1, S2 normal, no murmur, click, rub or gallop.    ABDOMEN: abdomen is soft without tenderness, no masses, no hernias, no " organomegaly, no rebound, no guarding. Suprapubic tenderness absent. No CVA tenderness.  SKIN: no rashes, no wounds, no other lesions  PSYCH: Alert, oriented x 3, thought content appropriate, speech normal, pleasant and cooperative, good eye contact, well groomed  MS:  No pain on palpation of right shoulder, but pain with flexion and abduction (acitve and passive)    Assessment/Plan:  Cortes Schroeder is a 62 y.o. male who presents today for :    Cortes was seen today for hypertension and diabetes.    Diagnoses and all orders for this visit:    Need for Tdap vaccination  -     (In Office Administered) Tdap Vaccine    Morbid obesity    Mild nonproliferative diabetic retinopathy of both eyes without macular edema associated with type 2 diabetes mellitus    High risk homosexual behavior  -     Comprehensive Metabolic Panel; Standing  -     HIV 1/2 Ag/Ab (4th Gen); Standing  -     RPR; Standing  -     C. trachomatis/N. gonorrhoeae by AMP DNA; Standing      1.  Truvada UTD   2.  Labs UTD and standing orders due  3.  Follow up 3 months or sooner if needed      Don Artis MD

## 2022-03-17 RX ORDER — LOSARTAN POTASSIUM 50 MG/1
TABLET ORAL
Qty: 90 TABLET | Refills: 3 | Status: SHIPPED | OUTPATIENT
Start: 2022-03-17

## 2022-03-18 NOTE — TELEPHONE ENCOUNTER
Refill Authorization Note   Cortes Schroeder  is requesting a refill authorization.  Brief Assessment and Rationale for Refill:  Approve     Medication Therapy Plan:       Medication Reconciliation Completed: No   Comments:   --->Care Gap information included below if applicable.   Orders Placed This Encounter    losartan (COZAAR) 50 MG tablet      Requested Prescriptions   Signed Prescriptions Disp Refills    losartan (COZAAR) 50 MG tablet 90 tablet 3     Sig: TAKE 1 TABLET(50 MG) BY MOUTH EVERY DAY       Cardiovascular:  Angiotensin Receptor Blockers Passed - 3/10/2022  5:27 AM        Passed - Patient is at least 18 years old        Passed - Last BP in normal range within 360 days     BP Readings from Last 3 Encounters:   03/16/22 130/80   02/09/22 124/70   10/20/21 132/81               Passed - Valid encounter within last 15 months     Recent Visits  Date Type Provider Dept   03/16/22 Office Visit Don Artis MD [facility]/ Peds   09/20/21 Office Visit Don Artis MD [facility] Med   09/04/20 Office Visit Don Artis MD [facility]/ Internal Med   Showing recent visits within past 720 days and meeting all other requirements  Future Appointments  No visits were found meeting these conditions.  Showing future appointments within next 150 days and meeting all other requirements      Future Appointments              In 1 month [facility]MP [facility], [facility] - Endo Diabetes 6th Fl, [place] [facility]    In 2 months SPECIMEN, [facility] - [facility]    In 2 months LAB, [facility] - Lab, [place]    In 3 months Don Artis MD [facility]                Passed - Cr is 1.39 or below and within 360 days     Lab Results   Component Value Date    CREATININE 1.16 01/28/2022    CREATININE 1.10 09/23/2021    CREATININE 1.5 (H) 01/11/2021              Passed - K is 5.2 or below and within 360 days     POC Potassium   Date Value Ref Range  Status   02/13/2015 3.6 3.5 - 5.1 mmol/L Final     Potassium   Date Value Ref Range Status   01/28/2022 4.6 3.5 - 5.2 mmol/L Final   09/23/2021 5.0 3.5 - 5.2 mmol/L Final   01/11/2021 4.4 3.5 - 5.1 mmol/L Final              Passed - eGFR within 360 days     Lab Results   Component Value Date    EGFRNONAA 68 01/28/2022    EGFRNONAA 73 09/23/2021    EGFRNONAA 50 (A) 01/11/2021                    Appointments  past 12m or future 3m with PCP    Date Provider   Last Visit   9/20/2021 Don Artis MD   Next Visit   3/15/2022 Don Artis MD   ED visits in past 90 days: 0     Note composed:8:37 PM 03/17/2022

## 2022-03-24 ENCOUNTER — PATIENT MESSAGE (OUTPATIENT)
Dept: ENDOCRINOLOGY | Facility: CLINIC | Age: 62
End: 2022-03-24
Payer: COMMERCIAL

## 2022-03-24 ENCOUNTER — PATIENT MESSAGE (OUTPATIENT)
Dept: FAMILY MEDICINE | Facility: CLINIC | Age: 62
End: 2022-03-24
Payer: COMMERCIAL

## 2022-03-24 DIAGNOSIS — E11.42 DIABETIC PERIPHERAL NEUROPATHY ASSOCIATED WITH TYPE 2 DIABETES MELLITUS: ICD-10-CM

## 2022-03-24 RX ORDER — NORTRIPTYLINE HYDROCHLORIDE 50 MG/1
50 CAPSULE ORAL NIGHTLY
Qty: 90 CAPSULE | Refills: 0 | Status: SHIPPED | OUTPATIENT
Start: 2022-03-24 | End: 2022-06-29 | Stop reason: SDUPTHER

## 2022-03-24 RX ORDER — GABAPENTIN 100 MG/1
CAPSULE ORAL
Qty: 120 CAPSULE | Refills: 3 | Status: SHIPPED | OUTPATIENT
Start: 2022-03-24 | End: 2022-08-01 | Stop reason: SDUPTHER

## 2022-03-24 NOTE — TELEPHONE ENCOUNTER
No new care gaps identified.  Powered by [employer]. Reference number: [id number].   3/24/2022 9:16:47 AM CDT

## 2022-04-11 ENCOUNTER — PATIENT MESSAGE (OUTPATIENT)
Dept: DIABETES | Facility: CLINIC | Age: 62
End: 2022-04-11
Payer: COMMERCIAL

## 2022-04-25 ENCOUNTER — PATIENT MESSAGE (OUTPATIENT)
Dept: FAMILY MEDICINE | Facility: CLINIC | Age: 62
End: 2022-04-25
Payer: COMMERCIAL

## 2022-04-25 DIAGNOSIS — E11.21 DIABETIC NEPHROPATHY ASSOCIATED WITH TYPE 2 DIABETES MELLITUS: ICD-10-CM

## 2022-04-28 LAB
ALBUMIN SERPL-MCNC: 3.9 G/DL (ref 3.8–4.8)
ALBUMIN/CREAT UR: 35 MG/G CREAT (ref 0–29)
ALBUMIN/GLOB SERPL: 1.8 {RATIO} (ref 1.2–2.2)
ALP SERPL-CCNC: 96 IU/L (ref 44–121)
ALT SERPL-CCNC: 37 IU/L (ref 0–44)
AST SERPL-CCNC: 26 IU/L (ref 0–40)
BILIRUB SERPL-MCNC: 0.3 MG/DL (ref 0–1.2)
BUN SERPL-MCNC: 15 MG/DL (ref 8–27)
BUN/CREAT SERPL: 10 (ref 10–24)
CALCIUM SERPL-MCNC: 9.4 MG/DL (ref 8.6–10.2)
CHLORIDE SERPL-SCNC: 103 MMOL/L (ref 96–106)
CO2 SERPL-SCNC: 24 MMOL/L (ref 20–29)
CREAT SERPL-MCNC: 1.44 MG/DL (ref 0.76–1.27)
CREAT UR-MCNC: 138.3 MG/DL
EST. GFR  (NO RACE VARIABLE): 55 ML/MIN/1.73
GLOBULIN SER CALC-MCNC: 2.2 G/DL (ref 1.5–4.5)
GLUCOSE SERPL-MCNC: 255 MG/DL (ref 65–99)
HBA1C MFR BLD: 9.9 % (ref 4.8–5.6)
MICROALBUMIN UR-MCNC: 48 UG/ML
POTASSIUM SERPL-SCNC: 5 MMOL/L (ref 3.5–5.2)
PROT SERPL-MCNC: 6.1 G/DL (ref 6–8.5)
SODIUM SERPL-SCNC: 138 MMOL/L (ref 134–144)

## 2022-05-11 ENCOUNTER — OFFICE VISIT (OUTPATIENT)
Dept: ENDOCRINOLOGY | Facility: CLINIC | Age: 62
End: 2022-05-11
Payer: COMMERCIAL

## 2022-05-11 VITALS
HEIGHT: 65 IN | DIASTOLIC BLOOD PRESSURE: 78 MMHG | WEIGHT: 200.19 LBS | SYSTOLIC BLOOD PRESSURE: 124 MMHG | BODY MASS INDEX: 33.35 KG/M2 | HEART RATE: 76 BPM

## 2022-05-11 DIAGNOSIS — Z96.41 INSULIN PUMP IN PLACE: ICD-10-CM

## 2022-05-11 DIAGNOSIS — E78.2 MIXED HYPERLIPIDEMIA: ICD-10-CM

## 2022-05-11 DIAGNOSIS — I10 HYPERTENSION, WELL CONTROLLED: ICD-10-CM

## 2022-05-11 PROCEDURE — 99999 PR PBB SHADOW E&M-EST. PATIENT-LVL V: ICD-10-PCS | Mod: PBBFAC,,,

## 2022-05-11 PROCEDURE — 95251 CONT GLUC MNTR ANALYSIS I&R: CPT | Mod: S$GLB,,, | Performed by: INTERNAL MEDICINE

## 2022-05-11 PROCEDURE — 99999 PR PBB SHADOW E&M-EST. PATIENT-LVL V: CPT | Mod: PBBFAC,,,

## 2022-05-11 PROCEDURE — 99214 OFFICE O/P EST MOD 30 MIN: CPT | Mod: 25,S$GLB,, | Performed by: INTERNAL MEDICINE

## 2022-05-11 PROCEDURE — 99214 PR OFFICE/OUTPT VISIT, EST, LEVL IV, 30-39 MIN: ICD-10-PCS | Mod: 25,S$GLB,, | Performed by: INTERNAL MEDICINE

## 2022-05-11 PROCEDURE — 95251 PR GLUCOSE MONITOR, 72 HOUR, PHYS INTERP: ICD-10-PCS | Mod: S$GLB,,, | Performed by: INTERNAL MEDICINE

## 2022-05-11 RX ORDER — PHENYLEPHRINE HCL 10 MG
500 TABLET ORAL 2 TIMES DAILY
COMMUNITY

## 2022-05-11 NOTE — ASSESSMENT & PLAN NOTE
Lab Results   Component Value Date    LABA1C 10.8 (H) 08/15/2016    HGBA1C 9.9 (H) 04/27/2022     -  Foot exam performed w/i 1 year No abnormalities  -  Optometry seen w/i 1 year  No retinopathy  -  CAESAR uptodate   On ACE or ARB (losartan 50 mg PO daily). Will repeat labs prior to next visit due to microalbuminuria noted. Will try improving BG control, otherwise will need to increase his losartan.   -  Lipids reviewed   On statin (Crestor 5 mg PO daily)                 PLAN:   -  Due to 72hr CGM and pump review: Noted to be 20% in range, 0% lows, GMI 9.5%. Patient remains to be hyperglycemic overnight due to not bolusing or giving corrections as instructed. Explain to pt the need to bolus for his snacks and to give corrections before bedtime to prevent the overnight glycemic excursions.     --Unable to strengthen dinner carb ratio due to ICR at 1:2. Instructed to bolus with 20% (mulitply expected carb x 1.2) increase in carbs at dinner.  --Will increase ozempic to 2 mg weekly   --Recommended setting alarm at 9:00 p.m. and to ask lalo to remind himi to administer correction bolus and to bolus for the nightly mints accordingly  --Pt instructed to call Bolongaro Trevor for help regarding his battery life  --No changes made to insulin pump settings    Pump/CGM:  Omnipod/Dexcom    Basal Rate  MN: 1.8  U/hr   6A:  2.2  U/hr     Carb Ratio  MN:  4  4P:  2    ISF  12A:  30  5A: 20  8P: 30    -  Diabetic supplies and medications: reviewed  -  I personally reviewed CGM/PUMP data     Supplies/Refills:  - Ozempic 2 mg sent to pharmacy    Labs Ordered:  -  A1c and urine microalbumin before next visit

## 2022-05-11 NOTE — PROGRESS NOTES
I have reviewed and concur with Dr. Song Riojas's history, physical, assessment, and plan.  I have personally interviewed and examined the patient.  See below addendum for my evaluation and additional findings.    Patient is still having significant hyperglycemia, most notably after dinner despite consistently giving dinner bolus.  He has not been increasing the number of carbs he enters for dinner by 20% so will reiterated doing that.  He is infrequently giving correction boluses at bedtime so we discussed the importance of making the habit (suggested giving correction boluses when he gets she has consists for his dog every night).  He will also continue to work on bolusing for all carbohydrate intake and limiting carbs to 45 g per meal.  As he is not able to tolerate side effects from metformin will get discontinue and instead increase Ozempic to 2 mg weekly.  Discussed that the microalbuminuria may resolve with improved blood glucose control so will recheck with next visit however if it persists will need to increase losartan.    Maynor Zaragoza MD

## 2022-05-11 NOTE — PATIENT INSTRUCTIONS
Return to clinic in 3 months with labs before visit    No changes made to pump    Please call omnipod about your battery life.    For any carbs even 2 grams from mints you need to enter it in your pump to get insulin.     For dinner take the total amount of carbs and multiply it by 1.2 and enter that amount into your pump.      For example if you are eating 60 grams of carbs:  60 x 1.2 = 72    So you would enter 72 grams of carbs in your pump.      As part of your bedtime routine you need to give a correction dose of insulin to prevent overnight highs.  Ask Jomar to help you remember to do this.

## 2022-05-11 NOTE — PROGRESS NOTES
Pump Clinic Return Visit      HPI:Cortes Schroeder is a 62 y.o. male who was diagnosed with type 2 DM in 1995 who is following up for pump management.       Interval History:    Last visit: 2/9/2022    Previous visit with significant hyperglycemia at night, due to forgetting to administer at night in addition to snacking at night and afraid of lows. Noted night basal was too strong they were decreased. ISF at night noted to be too strong, so a new setting of 1:30 was created.     Today 5/11/2022: Noted to be 20% in range, 0% lows, GMI 9.5%. Patient remains to be hyperglycemic overnight. Pt reports stopping his metformin 2 months ago due to diarrhea for past 20 years (now no longer having loose bowel stools). Pt reports he is bolusing for his nightly snacks but noted only two night boluses in the data. Explain to pt the need to bolus for his snacks and to give corrections before bedtime to prevent the overnight glycemic excursions.     Current diabetes regimen:    Pump:  Omnipod/Dexcom  On current pump since 2015                  Lab Results   Component Value Date    LABA1C 10.8 (H) 08/15/2016    HGBA1C 9.9 (H) 04/27/2022     Glucose Monitoring:  Meter/CGM:  Dexcom G6          Hypoglycemic Episodes:  None  Since last visit no severe hypoglycemic episodes requiring observer intervention. There were a couple of readings around 66 after dinner     Diet/Exercise:   Eats usually 2 meals a day.     Trying to cut down on fast food.    No exercise due to back pain    BF: sausage biscuit 7a-8a - 25-30 g carbs, food bars (28 g carbs)    L: skips mostly    D: 45-60 or more carbs, variable (hamburger, poboy), sandwiches, salads    Snack: occ.    Backup Long-acting Insulin:  Yes    Diabetes Management Status    Statin: Taking  ACE/ARB: Taking    Screening or Prevention Patient's value Goal Complete/Controlled?   HgA1C Testing and Control   Lab Results   Component Value Date    HGBA1C 9.9 (H) 04/27/2022      Annually/Less than  "8% No   Lipid profile : 09/23/2021 Annually Yes   LDL control Lab Results   Component Value Date    LDLCALC 34 09/23/2021    Annually/Less than 100 mg/dl  Yes   Nephropathy screening Lab Results   Component Value Date    LABMICR 35 (H) 04/27/2022     Lab Results   Component Value Date    PROTEINUA 1+ (A) 12/28/2019     Lab Results   Component Value Date    UTPCR 0.36 (H) 10/31/2016      Annually Yes   Blood pressure BP Readings from Last 1 Encounters:   05/11/22 124/78    Less than 140/90 Yes   Dilated retinal exam : 11/10/2021 Annually Yes   Foot exam   : 07/21/2021 Annually Yes       Screening / DM Complications:  Neuropathy yes  Last foot exam : 07/21/2021  Last eye exam : February 2022, per pt s/p cataract surgery   CVD/MI: none    A complete review of systems was conducted and negative except for stated above    Vital Signs  /78 (BP Location: Right arm, Patient Position: Sitting, BP Method: Medium (Manual))   Pulse 76   Ht 5' 5" (1.651 m)   Wt 90.8 kg (200 lb 2.8 oz)   BMI 33.31 kg/m²     Physical Exam  Constitutional:       Appearance: Normal appearance. He is obese.   Eyes:      Pupils: Pupils are equal, round, and reactive to light.   Cardiovascular:      Rate and Rhythm: Normal rate and regular rhythm.      Pulses: Normal pulses.      Heart sounds: Normal heart sounds.   Pulmonary:      Effort: Pulmonary effort is normal.      Breath sounds: Normal breath sounds.   Abdominal:      Palpations: Abdomen is soft. No injection/pump site abnormalities  Musculoskeletal:         General: Normal range of motion.   Skin:     General: Skin is warm.   Neurological:      General: No focal deficit present.      Mental Status: He is alert.   Psychiatric:         Behavior: Behavior normal.     Assessment/Plan    Uncontrolled type 2 diabetes mellitus with diabetic polyneuropathy, with long-term current use of insulin    Lab Results   Component Value Date    LABA1C 10.8 (H) 08/15/2016    HGBA1C 9.9 (H) 04/27/2022 "     -  Foot exam performed w/i 1 year No abnormalities  -  Optometry seen w/i 1 year  No retinopathy  -  CAESAR uptodate   On ACE or ARB (losartan 50 mg PO daily). Will repeat labs prior to next visit due to microalbuminuria noted. Will try improving BG control, otherwise will need to increase his losartan.   -  Lipids reviewed   On statin (Crestor 5 mg PO daily)                 PLAN:   -  Due to 72hr CGM and pump review: Noted to be 20% in range, 0% lows, GMI 9.5%. Patient remains to be hyperglycemic overnight due to not bolusing or giving corrections as instructed. Explain to pt the need to bolus for his snacks and to give corrections before bedtime to prevent the overnight glycemic excursions.     --Unable to strengthen dinner carb ratio due to ICR at 1:2. Instructed to bolus with 20% (mulitply expected carb x 1.2) increase in carbs at dinner.  --Will increase ozempic to 2 mg weekly   --Recommended setting alarm at 9:00 p.m. and to ask lalo to remind himi to administer correction bolus and to bolus for the nightly mints accordingly  --Pt instructed to call [employer] for help regarding his battery life  --No changes made to insulin pump settings    Pump/CGM:  Omnipod/Dexcom    Basal Rate  MN: 1.8  U/hr   6A:  2.2  U/hr     Carb Ratio  MN:  4  4P:  2    ISF  12A:  30  5A: 20  8P: 30    -  Diabetic supplies and medications: reviewed  -  I personally reviewed CGM/PUMP data     Supplies/Refills:  - Ozempic 2 mg sent to pharmacy    Labs Ordered:  -  A1c and urine microalbumin before next visit        Insulin pump in place  -Omnipod / Dexcom  -Device working, no issues, has supplies  -Change site every 3 days      Hyperlipidemia  Lipids reviewed, LDL at goal on statin therapy              Hypertension, well controlled  Continue antihypertensive medication regimen  BP at goal today

## 2022-05-19 NOTE — ADDENDUM NOTE
Addended by: VEDA PATEL on: 9/15/2020 08:09 AM     Modules accepted: Orders     Click to Modify Medication Indication on Note Save

## 2022-05-20 NOTE — PROGRESS NOTES
DIABETES EDUCATION NOTE  02/09/2022    Cortes Schroeder was seen by me in the multidisciplinary insulin pump clinic and the following topics were addressed:    Pump/CGM site concerns?  [x] No  [] Yes    Can recognize/manage pump  [] No  [x] Yes  Malfunction?    Meal patterns:    Specific diet? no   Meals/day 2-3   Snacks yes   Carb counting somewhat    Appropriate correction   [] No  [] Yes  of lows/highs?    Ability to upload data from home? [x] No  [] Yes    Pump type Omnipod Traditional  CGM Dexcom     Patient seen in pump clinic today.  Pump and CGM download were evaluated.  Covered back up insulin plan with patient in the event of pump failure/lack of supplies.  Advised to stay current with pump supply reorders. Reviewed site placement rotation and frequency of site changes.                 used

## 2022-06-06 ENCOUNTER — PATIENT MESSAGE (OUTPATIENT)
Dept: FAMILY MEDICINE | Facility: CLINIC | Age: 62
End: 2022-06-06
Payer: COMMERCIAL

## 2022-06-06 DIAGNOSIS — Z72.52 HIGH RISK HOMOSEXUAL BEHAVIOR: Primary | ICD-10-CM

## 2022-06-10 LAB
ALBUMIN SERPL-MCNC: 4 G/DL (ref 3.8–4.8)
ALBUMIN/GLOB SERPL: 1.6 {RATIO} (ref 1.2–2.2)
ALP SERPL-CCNC: 105 IU/L (ref 44–121)
ALT SERPL-CCNC: 22 IU/L (ref 0–44)
AST SERPL-CCNC: 18 IU/L (ref 0–40)
BASOPHILS # BLD AUTO: 0.1 X10E3/UL (ref 0–0.2)
BASOPHILS NFR BLD AUTO: 1 %
BILIRUB SERPL-MCNC: 0.5 MG/DL (ref 0–1.2)
BUN SERPL-MCNC: 19 MG/DL (ref 8–27)
BUN/CREAT SERPL: 13 (ref 10–24)
CALCIUM SERPL-MCNC: 10.2 MG/DL (ref 8.6–10.2)
CHLORIDE SERPL-SCNC: 98 MMOL/L (ref 96–106)
CO2 SERPL-SCNC: 27 MMOL/L (ref 20–29)
CREAT SERPL-MCNC: 1.49 MG/DL (ref 0.76–1.27)
EOSINOPHIL # BLD AUTO: 0.5 X10E3/UL (ref 0–0.4)
EOSINOPHIL NFR BLD AUTO: 6 %
ERYTHROCYTE [DISTWIDTH] IN BLOOD BY AUTOMATED COUNT: 14.4 % (ref 11.6–15.4)
EST. GFR  (NO RACE VARIABLE): 53 ML/MIN/1.73
GLOBULIN SER CALC-MCNC: 2.5 G/DL (ref 1.5–4.5)
GLUCOSE SERPL-MCNC: 223 MG/DL (ref 65–99)
HCT VFR BLD AUTO: 46.2 % (ref 37.5–51)
HGB BLD-MCNC: 15.2 G/DL (ref 13–17.7)
HIV 1+2 AB+HIV1 P24 AG SERPL QL IA: NON REACTIVE
IMM GRANULOCYTES # BLD AUTO: 0 X10E3/UL (ref 0–0.1)
IMM GRANULOCYTES NFR BLD AUTO: 0 %
LYMPHOCYTES # BLD AUTO: 2.4 X10E3/UL (ref 0.7–3.1)
LYMPHOCYTES NFR BLD AUTO: 29 %
MCH RBC QN AUTO: 29.2 PG (ref 26.6–33)
MCHC RBC AUTO-ENTMCNC: 32.9 G/DL (ref 31.5–35.7)
MCV RBC AUTO: 89 FL (ref 79–97)
MONOCYTES # BLD AUTO: 0.6 X10E3/UL (ref 0.1–0.9)
MONOCYTES NFR BLD AUTO: 7 %
NEUTROPHILS # BLD AUTO: 4.8 X10E3/UL (ref 1.4–7)
NEUTROPHILS NFR BLD AUTO: 57 %
PLATELET # BLD AUTO: 322 X10E3/UL (ref 150–450)
POTASSIUM SERPL-SCNC: 5 MMOL/L (ref 3.5–5.2)
PROT SERPL-MCNC: 6.5 G/DL (ref 6–8.5)
RBC # BLD AUTO: 5.21 X10E6/UL (ref 4.14–5.8)
RPR SER QL: NON REACTIVE
SODIUM SERPL-SCNC: 140 MMOL/L (ref 134–144)
WBC # BLD AUTO: 8.4 X10E3/UL (ref 3.4–10.8)

## 2022-06-17 ENCOUNTER — TELEPHONE (OUTPATIENT)
Dept: FAMILY MEDICINE | Facility: CLINIC | Age: 62
End: 2022-06-17
Payer: COMMERCIAL

## 2022-06-19 ENCOUNTER — PATIENT MESSAGE (OUTPATIENT)
Dept: ADMINISTRATIVE | Facility: OTHER | Age: 62
End: 2022-06-19
Payer: COMMERCIAL

## 2022-06-20 ENCOUNTER — OFFICE VISIT (OUTPATIENT)
Dept: FAMILY MEDICINE | Facility: CLINIC | Age: 62
End: 2022-06-20
Payer: COMMERCIAL

## 2022-06-20 DIAGNOSIS — Z72.52 HIGH RISK HOMOSEXUAL BEHAVIOR: ICD-10-CM

## 2022-06-20 DIAGNOSIS — Z20.6 EXPOSURE TO HIV: ICD-10-CM

## 2022-06-20 PROCEDURE — 3060F POS MICROALBUMINURIA REV: CPT | Mod: CPTII,95,, | Performed by: FAMILY MEDICINE

## 2022-06-20 PROCEDURE — 99214 OFFICE O/P EST MOD 30 MIN: CPT | Mod: 95,,, | Performed by: FAMILY MEDICINE

## 2022-06-20 PROCEDURE — 1159F PR MEDICATION LIST DOCUMENTED IN MEDICAL RECORD: ICD-10-PCS | Mod: CPTII,95,, | Performed by: FAMILY MEDICINE

## 2022-06-20 PROCEDURE — 1159F MED LIST DOCD IN RCRD: CPT | Mod: CPTII,95,, | Performed by: FAMILY MEDICINE

## 2022-06-20 PROCEDURE — 3066F PR DOCUMENTATION OF TREATMENT FOR NEPHROPATHY: ICD-10-PCS | Mod: CPTII,95,, | Performed by: FAMILY MEDICINE

## 2022-06-20 PROCEDURE — 4010F ACE/ARB THERAPY RXD/TAKEN: CPT | Mod: CPTII,95,, | Performed by: FAMILY MEDICINE

## 2022-06-20 PROCEDURE — 1160F RVW MEDS BY RX/DR IN RCRD: CPT | Mod: CPTII,95,, | Performed by: FAMILY MEDICINE

## 2022-06-20 PROCEDURE — 4010F PR ACE/ARB THEARPY RXD/TAKEN: ICD-10-PCS | Mod: CPTII,95,, | Performed by: FAMILY MEDICINE

## 2022-06-20 PROCEDURE — 99214 PR OFFICE/OUTPT VISIT, EST, LEVL IV, 30-39 MIN: ICD-10-PCS | Mod: 95,,, | Performed by: FAMILY MEDICINE

## 2022-06-20 PROCEDURE — 3066F NEPHROPATHY DOC TX: CPT | Mod: CPTII,95,, | Performed by: FAMILY MEDICINE

## 2022-06-20 PROCEDURE — 3060F PR POS MICROALBUMINURIA RESULT DOCUMENTED/REVIEW: ICD-10-PCS | Mod: CPTII,95,, | Performed by: FAMILY MEDICINE

## 2022-06-20 PROCEDURE — 3072F LOW RISK FOR RETINOPATHY: CPT | Mod: CPTII,95,, | Performed by: FAMILY MEDICINE

## 2022-06-20 PROCEDURE — 3072F PR LOW RISK FOR RETINOPATHY: ICD-10-PCS | Mod: CPTII,95,, | Performed by: FAMILY MEDICINE

## 2022-06-20 PROCEDURE — 1160F PR REVIEW ALL MEDS BY PRESCRIBER/CLIN PHARMACIST DOCUMENTED: ICD-10-PCS | Mod: CPTII,95,, | Performed by: FAMILY MEDICINE

## 2022-06-20 RX ORDER — EMTRICITABINE AND TENOFOVIR DISOPROXIL FUMARATE 200; 300 MG/1; MG/1
1 TABLET, FILM COATED ORAL DAILY
Qty: 30 TABLET | Refills: 2 | Status: SHIPPED | OUTPATIENT
Start: 2022-06-20 | End: 2022-10-17 | Stop reason: SDUPTHER

## 2022-06-20 NOTE — PROGRESS NOTES
The patient location is: home  The chief complaint leading to consultation is: follow up    Visit type: audiovisual    Face to Face time with patient: 15 minutes of total time spent on the encounter, which includes face to face time and non-face to face time preparing to see the patient (eg, review of tests), Obtaining and/or reviewing separately obtained history, Documenting clinical information in the electronic or other health record, Independently interpreting results (not separately reported) and communicating results to the patient/family/caregiver, or Care coordination (not separately reported).         Each patient to whom he or she provides medical services by telemedicine is:  (1) informed of the relationship between the physician and patient and the respective role of any other health care provider with respect to management of the patient; and (2) notified that he or she may decline to receive medical services by telemedicine and may withdraw from such care at any time.    Notes:       Routine Office Visit    Patient Name: Cortes Schroeder    : 1960  MRN: [id number]    Subjective:  Cortes is a 62 y.o. male who presents today for:    1. PrEP  Patient presenting today for follow up of PrEP.  He has been taking medication as prescribed.  He has not had any side effects related to the medication.  He is not currently sexually active.  When he is, its the same partner.  They occasionally use condoms.  He states that blood sugars have been doing better.   No rashes, penile discharge, night sweats, or unexplained weight loss.  Last HIV test done on 2022 as well as CMP.  HIV was negative and no CKD      Past Medical History  Past Medical History:   Diagnosis Date    Anxiety 2012    Back pain 2012    Cataract     Chronic pain syndrome 2016    Diabetes type 2, controlled 2016    Diabetic retinopathy     DM (diabetes mellitus) 2012    DM (diabetes mellitus), type 2,  uncontrolled 11/16/2013    Essential hypertension 2/20/2016    Gastroesophageal reflux disease without esophagitis 2/20/2016    Hyperlipidemia 12/18/2012    Insomnia 8/7/2014    Neuropathy 11/16/2013       Past Surgical History  Past Surgical History:   Procedure Laterality Date    BACK SURGERY  2003    Lumbar Spine    CATARACT EXTRACTION      EPIDURAL STEROID INJECTION N/A 1/16/2019    Procedure: Injection, Steroid, Epidural Cervical;  Surgeon: Andrew Sinclair Jr., MD;  Location: [facility] ENDO;  Service: Pain Management;  Laterality: N/A;  Cervical Epidural Steroid Injection C7-T1    67870    Arrive @ 1240    EPIDURAL STEROID INJECTION N/A 2/20/2019    Procedure: Injection, Steroid, Epidural;  Surgeon: Andrew Sinclair Jr., MD;  Location: [facility] ENDO;  Service: Pain Management;  Laterality: N/A;  Lumbar Epidural Steroid Injection L4-5    75819    Arrive @ 1215 (requests latest time)       Family History  Family History   Problem Relation Age of Onset    Cataracts Father     Hypertension Father     Parkinsonism Father     Alzheimer's disease Father     Migraines Mother     Hypertension Mother     Heart attack Mother     Amblyopia Neg Hx     Blindness Neg Hx     Glaucoma Neg Hx     Macular degeneration Neg Hx     Retinal detachment Neg Hx     Strabismus Neg Hx        Social History  Social History     Socioeconomic History    Marital status:    Tobacco Use    Smoking status: Never Smoker    Smokeless tobacco: Never Used   Substance and Sexual Activity    Alcohol use: Yes     Alcohol/week: 1.0 standard drink     Types: 1 Glasses of wine per week     Comment: occasionally    Drug use: No    Sexual activity: Not Currently     Partners: Male     Birth control/protection: Condom     Comment: 10/2/17      Social Determinants of Health     Financial Resource Strain: Low Risk     Difficulty of Paying Living Expenses: Not hard at all   Food Insecurity: No Food Insecurity    Worried  About Running Out of Food in the Last Year: Never true    Ran Out of Food in the Last Year: Never true   Transportation Needs: Unmet Transportation Needs    Lack of Transportation (Medical): Yes    Lack of Transportation (Non-Medical): No   Physical Activity: Insufficiently Active    Days of Exercise per Week: 2 days    Minutes of Exercise per Session: 30 min   Stress: No Stress Concern Present    Feeling of Stress : Only a little   Social Connections: Unknown    Frequency of Communication with Friends and Family: More than three times a week    Frequency of Social Gatherings with Friends and Family: Once a week    Active Member of Clubs or Organizations: No    Attends Club or Organization Meetings: Never    Marital Status:    Housing Stability: Low Risk     Unable to Pay for Housing in the Last Year: No    Number of Places Lived in the Last Year: 1    Unstable Housing in the Last Year: No       Current Medications  Current Outpatient Medications on File Prior to Visit   Medication Sig Dispense Refill    APPLE CIDER VINEGAR ORAL Take 1 capsule by mouth 2 (two) times a day.      blood-glucose sensor (DEXCOM G6 SENSOR) Omaira Change sensor every 10 days 3 each PRN    blood-glucose transmitter (DEXCOM G6 TRANSMITTER) Omaira Change transmitter every 3 months 1 each PRN    cinnamon bark (CINNAMON) 500 mg capsule Take 500 mg by mouth 2 (two) times a day.      DULoxetine (CYMBALTA) 60 MG capsule Take 1 capsule (60 mg total) by mouth once daily. 90 capsule 0    fish oil-omega-3 fatty acids 300-1,000 mg capsule Take by mouth 2 (two) times daily.      gabapentin (NEURONTIN) 100 MG capsule TAKE 1 CAPSULE BY MOUTH EVERY MORNING AND 3 CAPSULES BY MOUTH EVERY EVENING 120 capsule 3    HUMALOG U-100 INSULIN 100 unit/mL injection Use in insulin pump; max dose 150 units per day. 10 mL 11    insulin pump cartridge (OMNIPOD DASH 5 PACK POD) Crtg Apply new pod to skin every 1.5 days 60 each 3    insulin pump  controller (OMNIPOD DASH PDM KIT MISC) by Misc.(Non-Drug; Combo Route) route.      lancets Misc To check BG 4-7 times daily, to use with insurance preferred meter 100 each 6    losartan (COZAAR) 50 MG tablet TAKE 1 TABLET(50 MG) BY MOUTH EVERY DAY 90 tablet 3    magnesium oxide-Mg AA chelate (MG-PLUS-PROTEIN) 133 mg Tab Take 500 mg by mouth every evening.       nortriptyline (PAMELOR) 50 MG capsule Take 1 capsule (50 mg total) by mouth nightly. 90 capsule 0    OPW TEST CLAIM - DO NOT FILL Inject into the skin. OPW test claim. Do not fill. 50 mL 0    rosuvastatin (CRESTOR) 5 MG tablet Take 1 tablet (5 mg total) by mouth once daily. 90 tablet 3    semaglutide 2 mg/dose (8 mg/3 mL) PnIj Inject 2 mg into the skin every 7 days. 3 mL 5    vitamin D 1000 units Tab Take 185 mg by mouth 2 (two) times daily.       [DISCONTINUED] emtricitabine-tenofovir 200-300 mg (TRUVADA) 200-300 mg Tab TAKE 1 TABLET BY MOUTH EVERY DAY 30 tablet 0    [DISCONTINUED] insulin aspart U-100 (NOVOLOG U-100 INSULIN ASPART) 100 unit/mL injection Use in insulin pump. Max daily dose 150 units. 50 mL 5     No current facility-administered medications on file prior to visit.       Allergies   Review of patient's allergies indicates:   Allergen Reactions    Invokana [canagliflozin] Anaphylaxis    Percocet [oxycodone-acetaminophen] Nausea Only and Hallucinations    Biaxin [clarithromycin]     Hydrocodone Other (See Comments)     Dizzy/nausea/hallucinations    Sulfa (sulfonamide antibiotics) Nausea Only and Rash       Review of Systems (Pertinent positives)  Review of Systems   Constitutional: Negative.    HENT: Negative.    Eyes: Negative for discharge.   Respiratory: Negative for wheezing.    Cardiovascular: Negative for chest pain and palpitations.   Gastrointestinal: Negative for blood in stool, constipation, diarrhea and vomiting.   Genitourinary: Negative for hematuria and urgency.   Musculoskeletal: Positive for joint pain and  myalgias. Negative for neck pain.   Skin: Negative.    Neurological: Negative for weakness and headaches.   Endo/Heme/Allergies: Negative for polydipsia.         There were no vitals taken for this visit.    GENERAL APPEARANCE: in no apparent distress and well developed and well nourished  HEENT: PERRL, EOMI, Sclera clear, anicteric, Oropharynx clear, no lesions, Neck supple with midline trachea  NECK: normal, supple, no adenopathy, thyroid normal in size  RESPIRATORY: appears well, vitals normal, no respiratory distress, acyanotic, normal RR, chest clear, no wheezing, crepitations, rhonchi, normal symmetric air entry  HEART: regular rate and rhythm, S1, S2 normal, no murmur, click, rub or gallop.    ABDOMEN: abdomen is soft without tenderness, no masses, no hernias, no organomegaly, no rebound, no guarding. Suprapubic tenderness absent. No CVA tenderness.  SKIN: no rashes, no wounds, no other lesions  PSYCH: Alert, oriented x 3, thought content appropriate, speech normal, pleasant and cooperative, good eye contact, well groomed  MS:  No pain on palpation of right shoulder, but pain with flexion and abduction (acitve and passive)    Assessment/Plan:  Cortes Schroeder is a 62 y.o. male who presents today for :    Diagnoses and all orders for this visit:    High risk homosexual behavior    Exposure to HIV  -     emtricitabine-tenofovir 200-300 mg (TRUVADA) 200-300 mg Tab; Take 1 tablet by mouth once daily.      1.  Truvada UTD   2.  Labs UTD and standing orders due  3.  Follow up 3 months or sooner if needed      Don Artis MD

## 2022-07-18 ENCOUNTER — PATIENT MESSAGE (OUTPATIENT)
Dept: INTERNAL MEDICINE | Facility: CLINIC | Age: 62
End: 2022-07-18
Payer: COMMERCIAL

## 2022-08-17 ENCOUNTER — PATIENT MESSAGE (OUTPATIENT)
Dept: ENDOCRINOLOGY | Facility: CLINIC | Age: 62
End: 2022-08-17
Payer: COMMERCIAL

## 2022-08-17 ENCOUNTER — TELEPHONE (OUTPATIENT)
Dept: ENDOCRINOLOGY | Facility: CLINIC | Age: 62
End: 2022-08-17
Payer: COMMERCIAL

## 2022-08-17 NOTE — TELEPHONE ENCOUNTER
Called patient to inform him that we will reach out to the Endocrinology he will like to go to on tomorrow and fax everything to them .

## 2022-08-18 ENCOUNTER — PATIENT MESSAGE (OUTPATIENT)
Dept: INTERNAL MEDICINE | Facility: CLINIC | Age: 62
End: 2022-08-18
Payer: COMMERCIAL

## 2022-08-22 ENCOUNTER — PATIENT MESSAGE (OUTPATIENT)
Dept: ENDOCRINOLOGY | Facility: CLINIC | Age: 62
End: 2022-08-22
Payer: COMMERCIAL

## 2022-08-24 ENCOUNTER — PATIENT MESSAGE (OUTPATIENT)
Dept: ADMINISTRATIVE | Facility: HOSPITAL | Age: 62
End: 2022-08-24
Payer: COMMERCIAL

## 2022-09-09 ENCOUNTER — PATIENT MESSAGE (OUTPATIENT)
Dept: ADMINISTRATIVE | Facility: HOSPITAL | Age: 62
End: 2022-09-09
Payer: COMMERCIAL

## 2022-10-10 ENCOUNTER — PATIENT MESSAGE (OUTPATIENT)
Dept: FAMILY MEDICINE | Facility: CLINIC | Age: 62
End: 2022-10-10
Payer: COMMERCIAL

## 2022-10-17 ENCOUNTER — PATIENT MESSAGE (OUTPATIENT)
Dept: FAMILY MEDICINE | Facility: CLINIC | Age: 62
End: 2022-10-17
Payer: COMMERCIAL

## 2022-10-17 DIAGNOSIS — Z72.52 HIGH RISK HOMOSEXUAL BEHAVIOR: Primary | ICD-10-CM

## 2022-10-17 DIAGNOSIS — Z20.6 EXPOSURE TO HIV: ICD-10-CM

## 2022-10-17 RX ORDER — EMTRICITABINE AND TENOFOVIR DISOPROXIL FUMARATE 200; 300 MG/1; MG/1
1 TABLET, FILM COATED ORAL DAILY
Qty: 30 TABLET | Refills: 0 | Status: SHIPPED | OUTPATIENT
Start: 2022-10-17 | End: 2022-11-30 | Stop reason: SDUPTHER

## 2022-10-22 LAB — HBA1C MFR BLD: 12 % (ref 4.8–5.6)

## 2022-10-23 LAB
ALBUMIN SERPL-MCNC: 4.2 G/DL (ref 3.8–4.8)
ALBUMIN/GLOB SERPL: 1.4 {RATIO} (ref 1.2–2.2)
ALP SERPL-CCNC: 168 IU/L (ref 44–121)
ALT SERPL-CCNC: 75 IU/L (ref 0–44)
AST SERPL-CCNC: 61 IU/L (ref 0–40)
BILIRUB SERPL-MCNC: 0.3 MG/DL (ref 0–1.2)
BUN SERPL-MCNC: 31 MG/DL (ref 8–27)
BUN/CREAT SERPL: 21 (ref 10–24)
C TRACH RRNA SPEC QL NAA+PROBE: NEGATIVE
CALCIUM SERPL-MCNC: 9.6 MG/DL (ref 8.6–10.2)
CHLORIDE SERPL-SCNC: 98 MMOL/L (ref 96–106)
CO2 SERPL-SCNC: 24 MMOL/L (ref 20–29)
CREAT SERPL-MCNC: 1.46 MG/DL (ref 0.76–1.27)
EST. GFR  (NO RACE VARIABLE): 54 ML/MIN/1.73
GLOBULIN SER CALC-MCNC: 2.9 G/DL (ref 1.5–4.5)
GLUCOSE SERPL-MCNC: 352 MG/DL (ref 70–99)
HIV 1+2 AB+HIV1 P24 AG SERPL QL IA: NON REACTIVE
N GONORRHOEA RRNA SPEC QL NAA+PROBE: NEGATIVE
POTASSIUM SERPL-SCNC: 4.8 MMOL/L (ref 3.5–5.2)
PROT SERPL-MCNC: 7.1 G/DL (ref 6–8.5)
RPR SER QL: NON REACTIVE
SODIUM SERPL-SCNC: 135 MMOL/L (ref 134–144)

## 2022-10-25 ENCOUNTER — PATIENT MESSAGE (OUTPATIENT)
Dept: ENDOCRINOLOGY | Facility: CLINIC | Age: 62
End: 2022-10-25
Payer: COMMERCIAL

## 2022-10-25 ENCOUNTER — PATIENT MESSAGE (OUTPATIENT)
Dept: FAMILY MEDICINE | Facility: CLINIC | Age: 62
End: 2022-10-25
Payer: COMMERCIAL

## 2022-10-27 ENCOUNTER — PATIENT MESSAGE (OUTPATIENT)
Dept: FAMILY MEDICINE | Facility: CLINIC | Age: 62
End: 2022-10-27
Payer: COMMERCIAL

## 2022-10-31 ENCOUNTER — PATIENT MESSAGE (OUTPATIENT)
Dept: ENDOCRINOLOGY | Facility: CLINIC | Age: 62
End: 2022-10-31
Payer: COMMERCIAL

## 2022-10-31 DIAGNOSIS — Z96.41 INSULIN PUMP IN PLACE: Primary | ICD-10-CM

## 2022-12-05 ENCOUNTER — PATIENT MESSAGE (OUTPATIENT)
Dept: FAMILY MEDICINE | Facility: CLINIC | Age: 62
End: 2022-12-05
Payer: COMMERCIAL

## 2022-12-07 ENCOUNTER — PATIENT MESSAGE (OUTPATIENT)
Dept: FAMILY MEDICINE | Facility: CLINIC | Age: 62
End: 2022-12-07
Payer: COMMERCIAL

## 2022-12-07 DIAGNOSIS — Z72.52 HIGH RISK HOMOSEXUAL BEHAVIOR: Primary | ICD-10-CM

## 2022-12-12 ENCOUNTER — PATIENT MESSAGE (OUTPATIENT)
Dept: FAMILY MEDICINE | Facility: CLINIC | Age: 62
End: 2022-12-12
Payer: COMMERCIAL

## 2022-12-21 ENCOUNTER — PATIENT MESSAGE (OUTPATIENT)
Dept: FAMILY MEDICINE | Facility: CLINIC | Age: 62
End: 2022-12-21
Payer: COMMERCIAL

## 2022-12-21 ENCOUNTER — TELEPHONE (OUTPATIENT)
Dept: FAMILY MEDICINE | Facility: CLINIC | Age: 62
End: 2022-12-21
Payer: COMMERCIAL

## 2022-12-21 NOTE — TELEPHONE ENCOUNTER
tried to phone pt about the referral for Infection disskhadrae pt mailbox is full as of 12/21/22 @ 3:12 pm vs   Please call [phone] to make an appt

## 2022-12-22 ENCOUNTER — TELEPHONE (OUTPATIENT)
Dept: FAMILY MEDICINE | Facility: CLINIC | Age: 62
End: 2022-12-22
Payer: COMMERCIAL

## 2022-12-22 ENCOUNTER — PATIENT MESSAGE (OUTPATIENT)
Dept: FAMILY MEDICINE | Facility: CLINIC | Age: 62
End: 2022-12-22
Payer: COMMERCIAL

## 2022-12-22 NOTE — TELEPHONE ENCOUNTER
tried to reach pt about the referral  on Infection Disease voicemail is full. Just was calling to see if he  receive the appt with Infection Disease

## 2023-01-25 ENCOUNTER — PATIENT MESSAGE (OUTPATIENT)
Dept: FAMILY MEDICINE | Facility: CLINIC | Age: 63
End: 2023-01-25
Payer: COMMERCIAL

## 2023-01-25 DIAGNOSIS — E11.42 DIABETIC PERIPHERAL NEUROPATHY ASSOCIATED WITH TYPE 2 DIABETES MELLITUS: ICD-10-CM

## 2023-01-25 RX ORDER — GABAPENTIN 100 MG/1
CAPSULE ORAL
Qty: 120 CAPSULE | Refills: 0 | Status: SHIPPED | OUTPATIENT
Start: 2023-01-25 | End: 2024-03-01 | Stop reason: SDUPTHER

## 2023-01-25 NOTE — TELEPHONE ENCOUNTER
Spoke with patient about refill refill request. Patient is due to see a new endocrinologist in [place] on 2/7/23.

## 2023-02-01 RX ORDER — INSULIN LISPRO 100 [IU]/ML
INJECTION, SOLUTION INTRAVENOUS; SUBCUTANEOUS
Qty: 10 ML | Refills: 11 | Status: SHIPPED | OUTPATIENT
Start: 2023-02-01

## 2023-02-03 ENCOUNTER — PATIENT MESSAGE (OUTPATIENT)
Dept: FAMILY MEDICINE | Facility: CLINIC | Age: 63
End: 2023-02-03
Payer: COMMERCIAL

## 2023-02-08 DIAGNOSIS — Z20.6 EXPOSURE TO HIV: ICD-10-CM

## 2023-02-08 RX ORDER — EMTRICITABINE AND TENOFOVIR DISOPROXIL FUMARATE 200; 300 MG/1; MG/1
1 TABLET, FILM COATED ORAL DAILY
Qty: 30 TABLET | Refills: 0 | Status: SHIPPED | OUTPATIENT
Start: 2023-02-08

## 2023-02-10 ENCOUNTER — TELEPHONE (OUTPATIENT)
Dept: FAMILY MEDICINE | Facility: CLINIC | Age: 63
End: 2023-02-10
Payer: COMMERCIAL

## 2023-02-10 NOTE — TELEPHONE ENCOUNTER
Spoke with jose carlos about labs to be fax to her to see if she receive the labs paper work for this pt jose carlos states that she has the labs result  also she requested refill on emtricitabine-tenofovir 200-300 mg (TRUVADA) 200-300 mg Tab until the pt can seen in the [facility]

## 2023-03-19 DIAGNOSIS — Z20.6 EXPOSURE TO HIV: ICD-10-CM

## 2023-03-20 RX ORDER — EMTRICITABINE AND TENOFOVIR DISOPROXIL FUMARATE 200; 300 MG/1; MG/1
TABLET, FILM COATED ORAL
Qty: 30 TABLET | Refills: 0 | OUTPATIENT
Start: 2023-03-20

## 2023-03-20 NOTE — TELEPHONE ENCOUNTER
Refill Routing Note   Medication(s) are not appropriate for processing by [facility] for the following reason(s):         Responsible provider unclear  Medication outside of protocol    [facility] action(s):  Route         Appointments  past 12m or future 3m with PCP    Date Provider   Last Visit   6/20/2022 Don Artis MD   Next Visit   Visit date not found Don Artis MD   ED visits in past 90 days: 0        Note composed:9:59 PM 03/19/2023

## 2023-04-22 DIAGNOSIS — I10 HYPERTENSION, UNCONTROLLED: ICD-10-CM

## 2023-04-23 RX ORDER — LOSARTAN POTASSIUM 50 MG/1
TABLET ORAL
Qty: 90 TABLET | Refills: 3 | OUTPATIENT
Start: 2023-04-23

## 2023-04-23 NOTE — TELEPHONE ENCOUNTER
Refill Routing Note   Medication(s) are not appropriate for processing by [facility] for the following reason(s):      Requirement: established/responsible provider participating in [facility] program    [facility] action(s):  Route Appointment due            Appointments  past 12m or future 3m with PCP    Date Provider   Last Visit   6/20/2022 Don Artis MD   Next Visit   Visit date not found Don Artis MD   ED visits in past 90 days: 0        Note composed:8:28 PM 04/22/2023

## 2024-03-01 ENCOUNTER — OFFICE VISIT (OUTPATIENT)
Dept: PAIN MEDICINE | Facility: CLINIC | Age: 64
End: 2024-03-01
Payer: COMMERCIAL

## 2024-03-01 VITALS
HEART RATE: 67 BPM | RESPIRATION RATE: 18 BRPM | DIASTOLIC BLOOD PRESSURE: 68 MMHG | OXYGEN SATURATION: 97 % | SYSTOLIC BLOOD PRESSURE: 134 MMHG

## 2024-03-01 DIAGNOSIS — E11.42 DIABETIC PERIPHERAL NEUROPATHY ASSOCIATED WITH TYPE 2 DIABETES MELLITUS: ICD-10-CM

## 2024-03-01 DIAGNOSIS — M51.36 DDD (DEGENERATIVE DISC DISEASE), LUMBAR: Primary | ICD-10-CM

## 2024-03-01 DIAGNOSIS — M47.816 LUMBAR SPONDYLOSIS: ICD-10-CM

## 2024-03-01 DIAGNOSIS — M54.16 LUMBAR RADICULOPATHY: ICD-10-CM

## 2024-03-01 PROCEDURE — 3078F DIAST BP <80 MM HG: CPT | Mod: CPTII,S$GLB,, | Performed by: PAIN MEDICINE

## 2024-03-01 PROCEDURE — 99999 PR PBB SHADOW E&M-EST. PATIENT-LVL IV: CPT | Mod: PBBFAC,,, | Performed by: PAIN MEDICINE

## 2024-03-01 PROCEDURE — 4010F ACE/ARB THERAPY RXD/TAKEN: CPT | Mod: CPTII,S$GLB,, | Performed by: PAIN MEDICINE

## 2024-03-01 PROCEDURE — 1159F MED LIST DOCD IN RCRD: CPT | Mod: CPTII,S$GLB,, | Performed by: PAIN MEDICINE

## 2024-03-01 PROCEDURE — 99204 OFFICE O/P NEW MOD 45 MIN: CPT | Mod: S$GLB,,, | Performed by: PAIN MEDICINE

## 2024-03-01 PROCEDURE — 3075F SYST BP GE 130 - 139MM HG: CPT | Mod: CPTII,S$GLB,, | Performed by: PAIN MEDICINE

## 2024-03-01 PROCEDURE — 1160F RVW MEDS BY RX/DR IN RCRD: CPT | Mod: CPTII,S$GLB,, | Performed by: PAIN MEDICINE

## 2024-03-01 RX ORDER — GABAPENTIN 600 MG/1
1200 TABLET ORAL NIGHTLY
Qty: 180 TABLET | Refills: 3 | Status: SHIPPED | OUTPATIENT
Start: 2024-03-01 | End: 2025-03-01

## 2024-03-01 RX ORDER — GABAPENTIN 100 MG/1
100 CAPSULE ORAL DAILY
Qty: 90 CAPSULE | Refills: 3 | Status: SHIPPED | OUTPATIENT
Start: 2024-03-01 | End: 2025-03-01

## 2024-03-01 NOTE — PROGRESS NOTES
Subjective:     Patient ID: Cortes Schroeder is a 64 y.o. male    Chief Complaint: Low-back Pain (Bilateral constant achy pain)      Referred by: Andrew Sinclair Jr*      HPI:    Interval History (3/1/24):  He returns today for follow up.  He has been having severe left lower extremity pain.  Pain extends in the left leg and into his 2nd and 3rd toe.  Pain is sharp and burning.  Associated tingling.  Pain is constant.  Significantly disrupts sleep.  Does have some mild associated left-sided low back pain, but actually has more right-sided low back pain at this time.  Denies any right lower extremity symptoms.       Interval History (1/29/19):  He returns today for follow up.  He reports that C7-T1 interlaminar epidural steroid injection has been helpful for the neck and upper extremity pain. He reports about 85% relief of the symptoms.  He is happy with this relief and does not feel as though any further interventions are needed for his neck.  However he does wish to discuss his chronic low back pain today.  He reports having chronic low back pain and is status post lumbar surgery.  The pain is located across the lower lumbar region more prominent on the right side.  The pain will radiate down the posterior right thigh and leg with associated numbness and tingling.  He reports intermittent weakness during activity but denies any focal weakness or bowel or bladder dysfunction.      Initial Encounter (1/8/19):  Cortes Schroeder is a 64 y.o. male who presents today with chronic neck pain R>L and bilateral upper extremity pain, numbness and weakness. This has been present for at least one year. The pain is located in the cervical region and radiates to the right arm, forearm and hand. There is associated numbness in this area. He reports that his right upper extremity is also weak. He denies any weakness in the left upper extremity. He denies b/b dysfunction. The pain is constant and worsened with activity.    This  pain is described in detail below.    Physical Therapy: Yes.  Not recently.    Non-pharmacologic Treatment: rest helps         TENS?: no    Pain Medications:         Currently taking: Gabapentin, Cymbalta, nortriptyline    Has tried in the past:      Has not tried: Opioids, NSAIDs, Tylenol, Muscle relaxants, topical creams    Blood thinners: ASA 81mg daily    Interventional Therapies:   1/16/19 - C7-T1 interlaminar epidural steroid injection - 85% relief noted    Relevant Surgeries:   Previous low back surgery no other details specified at this time    Affecting sleep? Yes    Affecting daily activities? yes    Depressive symptoms? yes          SI/HI? No    Work status: Employed    Pain Scores:    Best:       1/10  Worst:     10/10  Usually:   7/10  Today:    5/10    Pain Disability Index  Family/Home Responsibilities:: 8  Recreation:: 9  Social Activity:: 9  Occupation:: 9  Sexual Behavior:: 9  Self Care:: 6  Life-Support Activities:: 10  Pain Disability Index (PDI): 60      Review of Systems   Constitutional:  Negative for activity change, appetite change, chills, fatigue, fever and unexpected weight change.   HENT:  Negative for hearing loss.    Eyes:  Negative for visual disturbance.   Respiratory:  Negative for chest tightness and shortness of breath.    Cardiovascular:  Negative for chest pain.   Gastrointestinal:  Negative for abdominal pain, constipation, diarrhea, nausea and vomiting.   Genitourinary:  Negative for difficulty urinating.   Musculoskeletal:  Positive for arthralgias, back pain and gait problem. Negative for myalgias, neck pain and neck stiffness.   Skin:  Negative for rash.   Neurological:  Positive for numbness. Negative for dizziness, weakness, light-headedness and headaches.   Psychiatric/Behavioral:  Positive for sleep disturbance. Negative for hallucinations and suicidal ideas. The patient is not nervous/anxious.        Past Medical History:   Diagnosis Date    Anxiety 12/18/2012    Back  pain 12/18/2012    Cataract     Chronic pain syndrome 4/24/2016    Diabetes type 2, controlled 2/20/2016    Diabetic retinopathy     DM (diabetes mellitus) 12/18/2012    DM (diabetes mellitus), type 2, uncontrolled 11/16/2013    Essential hypertension 2/20/2016    Gastroesophageal reflux disease without esophagitis 2/20/2016    Hyperlipidemia 12/18/2012    Insomnia 8/7/2014    Neuropathy 11/16/2013       Past Surgical History:   Procedure Laterality Date    BACK SURGERY  2003    Lumbar Spine    CATARACT EXTRACTION      EPIDURAL STEROID INJECTION N/A 1/16/2019    Procedure: Injection, Steroid, Epidural Cervical;  Surgeon: Andrew Sinclair Jr., MD;  Location: [facility] ENDO;  Service: Pain Management;  Laterality: N/A;  Cervical Epidural Steroid Injection C7-T1    02891    Arrive @ 1240    EPIDURAL STEROID INJECTION N/A 2/20/2019    Procedure: Injection, Steroid, Epidural;  Surgeon: Andrew Sinclair Jr., MD;  Location: [facility] ENDO;  Service: Pain Management;  Laterality: N/A;  Lumbar Epidural Steroid Injection L4-5    86043    Arrive @ 1215 (requests latest time)       Social History     Socioeconomic History    Marital status:    Tobacco Use    Smoking status: Never    Smokeless tobacco: Never   Substance and Sexual Activity    Alcohol use: Yes     Alcohol/week: 1.0 standard drink of alcohol     Types: 1 Glasses of wine per week     Comment: occasionally    Drug use: No    Sexual activity: Not Currently     Partners: Male     Birth control/protection: Condom     Comment: 10/2/17      Social Determinants of Health     Financial Resource Strain: Patient Declined (2/23/2024)    Overall Financial Resource Strain (CARDIA)     Difficulty of Paying Living Expenses: Patient declined   Food Insecurity: Patient Declined (2/23/2024)    Hunger Vital Sign     Worried About Running Out of Food in the Last Year: Patient declined     Ran Out of Food in the Last Year: Patient declined   Transportation Needs: No  Transportation Needs (2/23/2024)    PRAPARE - Transportation     Lack of Transportation (Medical): No     Lack of Transportation (Non-Medical): No   Physical Activity: Insufficiently Active (2/23/2024)    Exercise Vital Sign     Days of Exercise per Week: 3 days     Minutes of Exercise per Session: 10 min   Stress: Stress Concern Present (2/23/2024)    [employer] [place] [employer] - Occupational Stress Questionnaire     Feeling of Stress : To some extent   Social Connections: Unknown (2/23/2024)    Social Connection and Isolation Panel [NHANES]     Frequency of Communication with Friends and Family: More than three times a week     Frequency of Social Gatherings with Friends and Family: Three times a week     Active Member of Clubs or Organizations: No     Attends Club or Organization Meetings: Never     Marital Status:    Housing Stability: Low Risk  (2/23/2024)    Housing Stability Vital Sign     Unable to Pay for Housing in the Last Year: No     Number of Places Lived in the Last Year: 1     Unstable Housing in the Last Year: No       Review of patient's allergies indicates:   Allergen Reactions    Invokana [canagliflozin] Anaphylaxis    Percocet [oxycodone-acetaminophen] Nausea Only and Hallucinations    Biaxin [clarithromycin]     Hydrocodone Other (See Comments)     Dizzy/nausea/hallucinations    Sulfa (sulfonamide antibiotics) Nausea Only and Rash       Current Outpatient Medications on File Prior to Visit   Medication Sig Dispense Refill    cinnamon bark (CINNAMON) 500 mg capsule Take 500 mg by mouth 2 (two) times a day.      DULoxetine (CYMBALTA) 60 MG capsule Take 1 capsule (60 mg total) by mouth once daily. 90 capsule 0    emtricitabine-tenofovir 200-300 mg (TRUVADA) 200-300 mg Tab Take 1 tablet by mouth once daily. 30 tablet 0    fish oil-omega-3 fatty acids 300-1,000 mg capsule Take by mouth 2 (two) times daily.      HUMALOG U-100 INSULIN 100 unit/mL injection Use in insulin pump; max  dose 150 units per day. 10 mL 11    insulin pump cartridge (OMNIPOD DASH 5 PACK POD) Crtg Apply new pod to skin every 1.5 days 60 each 3    insulin pump controller (OMNIPOD DASH PDM KIT MISC) by Misc.(Non-Drug; Combo Route) route.      lancets [facility] To check BG 4-7 times daily, to use with insurance preferred meter 100 each 6    losartan (COZAAR) 50 MG tablet TAKE 1 TABLET(50 MG) BY MOUTH EVERY DAY 90 tablet 3    magnesium oxide-Mg AA chelate (MG-PLUS-PROTEIN) 133 mg Tab Take 500 mg by mouth every evening.       nortriptyline (PAMELOR) 50 MG capsule Take 1 capsule (50 mg total) by mouth nightly. 90 capsule 0    OPW TEST CLAIM - DO NOT FILL Inject into the skin. OPW test claim. Do not fill. 50 mL 0    rosuvastatin (CRESTOR) 5 MG tablet TAKE 1 TABLET(5 MG) BY MOUTH EVERY DAY 90 tablet 3    vitamin D 1000 units Tab Take 185 mg by mouth 2 (two) times daily.       [DISCONTINUED] gabapentin (NEURONTIN) 100 MG capsule TAKE 1 CAPSULE BY MOUTH EVERY MORNING AND 3 CAPSULES BY MOUTH EVERY EVENING 120 capsule 0    blood-glucose sensor (DEXCOM G6 SENSOR) [place] Change sensor every 10 days 3 each PRN    blood-glucose transmitter (DEXCOM G6 TRANSMITTER) [place] Change transmitter every 3 months 1 each PRN    [DISCONTINUED] APPLE CIDER VINEGAR ORAL Take 1 capsule by mouth 2 (two) times a day.      [DISCONTINUED] insulin aspart U-100 (NOVOLOG U-100 INSULIN ASPART) 100 unit/mL injection Use in insulin pump. Max daily dose 150 units. 50 mL 5    [DISCONTINUED] semaglutide 2 mg/dose (8 mg/3 mL) PnIj Inject 2 mg into the skin every 7 days. 3 mL 5     No current facility-administered medications on file prior to visit.       Objective:      /68 (BP Location: Right arm, Patient Position: Sitting, BP Method: Medium (Automatic))   Pulse 67   Resp 18   SpO2 97%     Exam:  GEN:  Well developed, well nourished.  No acute distress.  Normal pain behavior.  HEENT:  No trauma.  Mucous membranes moist.  Nares patent bilaterally.  PSYCH: Normal  affect. Thought content appropriate.  CHEST:  Breathing symmetric.  No audible wheezing.  ABD: Soft, non-distended.  SKIN:  Warm, pink, dry.  No rash on exposed areas.    EXT:  No cyanosis, clubbing, or edema.  No color change or changes in nail or hair growth.  NEURO/MUSCULOSKELETAL:  Fully alert, oriented, and appropriate. Speech normal tasha. No cranial nerve deficits.   Gait:  Normal.  No trendelenburg sign bilaterally.   Motor Strength:  5/5 motor strength throughout lower extremities.   Sensory:  No sensory deficit in the lower extremities.   Reflexes:  1+ and symmetric throughout.  Downgoing Babinski's bilaterally.  No clonus or spasticity.  L-Spine:  Full ROM with pain on flexion and extension.  Negative pain with axial/facet loading bilaterally.  Positive SLR on the left.    Positive TTP over bilateral lower lumbar paraspinals           Imaging:    Narrative     EXAMINATION:  MRI LUMBAR SPINE WITHOUT CONTRAST    CLINICAL HISTORY:  radicular leg pain; Arthrodesis status    TECHNIQUE:  Multiplanar, multisequence MR images were acquired from the thoracolumbar junction to the sacrum without the administration of contrast.    COMPARISON:  None.    FINDINGS:  Small portions of this exam are limited by patient motion.    The vertebral bodies maintain normal height, signal intensity and alignment.  There is multilevel disc desiccation with loss of disc height identified at L4-L5 and L5-S1.  This is most advanced at L4-L5.  The conus terminates at level L1.  Evaluation of the localizer images and structures surrounding the lumbar spine reveal no abnormalities.  There is mild central canal stenosis throughout likely related to congenitally shortened pedicles.    L1-L2: No significant disc or joint pathology.    L2-L3: Mild diffuse disc bulge present.  No facet arthropathy or ligamentum flavum hypertrophy.  The central canal and neural foramen are patent.    L3-L4: Mild diffuse disc bulge with very mild bilateral  facet arthropathy and some ligamentum flavum hypertrophy.  There is mild left neural foraminal stenosis.  The right neural foramen is patent.    L4-L5: There is a large diffuse disc bulge with moderate to severe bilateral facet arthropathy and no significant ligamentum flavum hypertrophy.  No significant central canal stenosis however, there is moderate bilateral neural foraminal stenosis.    L5-S1: Mild diffuse disc bulge.  Moderate to severe right and mild-to-moderate left facet arthropathy present.  No significant ligamentum flavum hypertrophy.  The neural foramen are patent.   Impression       Multilevel degenerative disc and joint disease is present.  Congenitally shortened pedicles likely contributes to the mild central canal stenosis throughout the lumbar spine.  No severe central canal or neural foraminal stenosis at any level.      Electronically signed by: Linda Sadler MD  Date: 08/13/2018  Time: 13:54         Narrative     History: Cervicalgia.    Procedure: Sagittal T1, T2, STIR sequences and axial T2-weighted sequence and a gradient echo sequence is performed.    Comparison study none    Findings:    There is normal cervical lordosis. No marrow replacing lesions or fractures noted. Craniovertebral alignment is within normal limits. There is no Chiari type malformations. The signal intensity within the cervical cord is within normal limits.    C2-3: No disc herniations or spinal stenosis.    C3-4: There is disc dehydration. No significant disc herniations or spinal stenosis. There is at least moderate right foraminal stenosis and mild left foraminal stenosis. No cord compression.    C4-5: Approximately 2 mm posterior subluxation of C4 on C5 associated with disc dehydration and a posterior disc bulge and osteophyte complex. Effacement of the anterior subarachnoid space with slight dorsal displacement and compression of the cord is noted (image 10 series 7 and 6). There is severe bilateral foraminal  stenosis. At least mild central canal spinal stenosis.    C5-6: Severe disc degeneration with disc space narrowing and global anterior and posterior osteophyte and disc bulge complex. There is posterior displacement of the cervical cord with slight flattening of the cervical cord. There is central canal spinal stenosis. There is severe right foraminal stenosis and moderate left foraminal stenosis.    C6-7: Mild broad-based left paracentral disc herniation and osteophyte complex without cord compression or spinal stenosis. There is a moderate to severe left foraminal stenosis and moderate right foraminal stenosis.    C7-T1: No significant central canal spinal stenosis or disc herniations or foraminal stenosis.   Impression         1. Multilevel degenerative disc disease as above. There is spinal stenosis and foraminal stenotic changes more pronounced at C4-5 and C5-6 disc spaces. See details at each disc space level above.      Electronically signed by: DELL TELLEZ MD  Date: 12/12/17  Time: 14:27          Assessment:       Encounter Diagnoses   Name Primary?    DDD (degenerative disc disease), lumbar Yes    Lumbar spondylosis     Lumbar radiculopathy     Diabetic peripheral neuropathy associated with type 2 diabetes mellitus            Plan:       Cortes was seen today for low-back pain.    Diagnoses and all orders for this visit:    DDD (degenerative disc disease), lumbar  -     Ambulatory referral/consult to Physical/Occupational Therapy; Future  -     gabapentin (NEURONTIN) 100 MG capsule; Take 1 capsule (100 mg total) by mouth once daily.  -     gabapentin (NEURONTIN) 600 MG tablet; Take 2 tablets (1,200 mg total) by mouth every evening.    Lumbar spondylosis  -     Ambulatory referral/consult to Physical/Occupational Therapy; Future  -     gabapentin (NEURONTIN) 100 MG capsule; Take 1 capsule (100 mg total) by mouth once daily.  -     gabapentin (NEURONTIN) 600 MG tablet; Take 2 tablets (1,200 mg total) by  mouth every evening.    Lumbar radiculopathy  -     Ambulatory referral/consult to Physical/Occupational Therapy; Future  -     gabapentin (NEURONTIN) 100 MG capsule; Take 1 capsule (100 mg total) by mouth once daily.  -     gabapentin (NEURONTIN) 600 MG tablet; Take 2 tablets (1,200 mg total) by mouth every evening.    Diabetic peripheral neuropathy associated with type 2 diabetes mellitus  -     gabapentin (NEURONTIN) 100 MG capsule; Take 1 capsule (100 mg total) by mouth once daily.  -     gabapentin (NEURONTIN) 600 MG tablet; Take 2 tablets (1,200 mg total) by mouth every evening.          Cortes Schroeder is a 64 y.o. male with severe left lower extremity pain.  Subjectively neuropathic in nature.  Concerning for lumbar radicular pain.  Does have back pain.  Bilateral, but worse on the right side.    Pertinent imaging studies reviewed by me. Imaging results were discussed with patient.  Continue gabapentin.  Patient finds this medication sedating so can only tolerate low doses in the morning, but we will sometimes require upwards of 1200 mg at night to get relief so he can sleep.  I have written 2 separate prescriptions so that he can take 100 mg in the morning and up to 1200 mg q.h.s..  Refer to PT for ROM, strengthening, stretching and HEP.  Return to clinic in 8 weeks or sooner if needed.  At that time we will discuss efficacy of physical therapy/home exercise program.  May consider repeat lumbar MRI and possibly epidural steroid injection if pain persists or worsens.     solids

## 2024-03-07 ENCOUNTER — CLINICAL SUPPORT (OUTPATIENT)
Dept: REHABILITATION | Facility: OTHER | Age: 64
End: 2024-03-07
Attending: PAIN MEDICINE
Payer: COMMERCIAL

## 2024-03-07 DIAGNOSIS — M54.16 LEFT LUMBAR RADICULITIS: Primary | ICD-10-CM

## 2024-03-07 DIAGNOSIS — M54.16 LUMBAR RADICULOPATHY: ICD-10-CM

## 2024-03-07 DIAGNOSIS — M51.36 DDD (DEGENERATIVE DISC DISEASE), LUMBAR: ICD-10-CM

## 2024-03-07 DIAGNOSIS — M47.816 LUMBAR SPONDYLOSIS: ICD-10-CM

## 2024-03-07 PROCEDURE — 97163 PT EVAL HIGH COMPLEX 45 MIN: CPT | Mod: PN

## 2024-03-07 PROCEDURE — 97530 THERAPEUTIC ACTIVITIES: CPT | Mod: PN

## 2024-03-11 PROBLEM — M54.16 LEFT LUMBAR RADICULITIS: Status: ACTIVE | Noted: 2024-03-11

## 2024-03-11 NOTE — PLAN OF CARE
"[facility]   Physical Therapy Initial Evaluation      Name: Cortes Schroeder  Clinic Number: [id number]    Therapy Diagnosis:   Encounter Diagnoses   Name Primary?    DDD (degenerative disc disease), lumbar     Lumbar spondylosis     Lumbar radiculopathy     Left lumbar radiculitis Yes        Physician: Andrew Sinclair Jr*    Physician Orders: PT Eval and Treat   Medical Diagnosis from Referral: M51.36 (ICD-10-CM) - DDD (degenerative disc disease), lumbar M47.816 (ICD-10-CM) - Lumbar spondylosis M54.16 (ICD-10-CM) - Lumbar radiculopathy  Evaluation Date: 3/7/2024  Authorization Period Expiration: 03/01/2025  Plan of Care Expiration: 6/7/2024  Progress Note Due: 4/7/2024  Date of Surgery: n/a  Visit # / Visits authorized: 1/ 1   FOTO: 1/ 3    Precautions: Standard, Anxiety, DM2, GERD, visual deficits, neuropathy, PSHx lumbar (laminectomy L3-5 in 2003)    Time In: 345 pm  Time Out: 430 pm  Total Billable Time: 45 minutes    Subjective     Date of onset: 2 months ago    History of current condition - Cortes reports: Chronic hx of low back pain that started "years ago" following breaking his back and requiring a lumbar surgery (believes it was a laminectomy, not fusion). Says back pain has remained intermittent with occasional R glute pain. C/c today is new onset of left leg pain/ "throbbing" that has worsened over the past 2 months and causes numbness/tingling down into the left foot [indicates digits 2 and 3]. Denies EDMAR or trauma at time of onset, but says just before the foot numbness started he was having severe pain in the L knee. Knee pain gradually subsided but says that shortly after his left 3rd toe started to go numb, shortly followed by the 2nd toe.     Foot/toe numbness/tingling and leg pain is constant. Denies activities that improve sx. Pain with: Sleep disturbed (severe - req's meds to sleep a few hrs at a time), Climbing stairs/ladders is painful (hanging pictures in the " apartment, decorating home).          Falls: none    Imaging: MRI studies, [facility], 2018: Multilevel degenerative disc and joint disease is present. Congenitally shortened pedicles likely contributes to the mild central canal stenosis throughout the lumbar spine. No severe central canal or neural foraminal stenosis at any level.     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, home is in [place] (drives home for the weekend)  Prior Level of Function: indep  Current Level of Function: pain and difficulty with all functional and work activities    Pain:  Current 6/10 in foot, (5/10 in back), worst 10/10 in foot (low back 7-8/10), best 6/10   Location: narda low back, L lower leg/foot (digits 2 and 3)  Description: low back = soreness/constant aching/tightness, numbness/tingling = L foot  Aggravating Factors: sleeping, stair climbing, standing/walking >15 minutes (uses a buggy when shopping to unweight low back)  Easing Factors: massage and pain medication, got an injection to the foot from podiatry (denies improvement afterwards)    Patients goals: reduce foot pain, improve strength and movement to be able to return to work     Medical History:   Past Medical History:   Diagnosis Date    Anxiety 12/18/2012    Back pain 12/18/2012    Cataract     Chronic pain syndrome 4/24/2016    Diabetes type 2, controlled 2/20/2016    Diabetic retinopathy     DM (diabetes mellitus) 12/18/2012    DM (diabetes mellitus), type 2, uncontrolled 11/16/2013    Essential hypertension 2/20/2016    Gastroesophageal reflux disease without esophagitis 2/20/2016    Hyperlipidemia 12/18/2012    Insomnia 8/7/2014    Neuropathy 11/16/2013       Surgical History:   Cortes JD Schroeder  has a past surgical history that includes Back surgery (2003); Epidural steroid injection (N/A,  "1/16/2019); Epidural steroid injection (N/A, 2/20/2019); and Cataract extraction.    Medications:   Cortes has a current medication list which includes the following prescription(s): dexcom g6 sensor, dexcom g6 transmitter, cinnamon bark, duloxetine, emtricitabine-tenofovir 200-300 mg, fish oil-omega-3 fatty acids, gabapentin, gabapentin, humalog u-100 insulin, omnipod dash pods (gen 4), insulin pump controller, lancets, losartan, magnesium oxide-mg aa chelate, nortriptyline, OPW TEST CLAIM - DO NOT FILL, rosuvastatin, vitamin d, and [DISCONTINUED] insulin aspart u-100.    Allergies:   Review of patient's allergies indicates:   Allergen Reactions    Invokana [canagliflozin] Anaphylaxis    Percocet [oxycodone-acetaminophen] Nausea Only and Hallucinations    Biaxin [clarithromycin]     Hydrocodone Other (See Comments)     Dizzy/nausea/hallucinations    Sulfa (sulfonamide antibiotics) Nausea Only and Rash        Objective      Postural examination/scapula alignment: decreased lordosis, increased upper TSP kyphosis with rounded shoulders and FHP   Sensory deficit: mod hypo over digits 2 and 3 on L foot (dorsum > plantar), with more mild sensory deficits (light tough) to medial and lateral borders of the L foot  Reflexes:    Left Right   Patella Tendon 1+ 1+   Achilles Tendon 1+ 1+   Babinski  (--) (--)   Clonus (--) (--)       Thoracic/Lumbar AROM: Pain/Dysfunction with Movement:   Flexion  Mod cisneros, fingertips to mid-tibia, stiffness across low back with poor reversal of lumbar curve, tension down post L leg    Repeated standing: no change  Repeated supine: no change   Extension  Max cisneros, poor lumbar curve, UA to extend hips > neutral, stiffness in LSP    Repeated standing: no change  Repeated prone: NT 2* to time constraints   Right side bending Mod-max cisneros, decreased LSP curve, fingertips >2" above knee joint line, c/o pulling pain in low back (L), LLE   Left side bending Mod-max cisneros, decreased LSP curve, fingertips " ">2" above knee joint line, c/o stiffness in low back   Right rotation Max cisneros, <25%, c/o pulling pain in L low back   Left rotation Max cisneros, <25%, c/o stiffness in low back     Hip ROM: mod-max cisneros narda ER > IR, hip ext = lacks 5 deg  Knee ROM: 0-5-120    Lower Extremity Strength  Right LE  Left LE    Hip flexion: 4/5 Hip flexion: 4-/5   Hip extension:  4/5 Hip extension: 4/5   Hip abduction: 4/5 Hip abduction: 4-/5   Hip adduction:  4+/5 Hip adduction:  4+/5   Hip Internal rotation   4+/5 Hip Internal rotation 4/5   Knee Flexion 5/5 Knee Flexion 4/5 - notes anterior knee pain   Knee Extension 5/5 Knee Extension 4-/5   Ankle dorsiflexion: 5/5 Ankle dorsiflexion: 4-/5   Ankle plantarflexion: 5/5 Ankle plantarflexion: 4-/5     Flexibility:   Demetri test: Hip flexors: mod cisneros L>R  Ely's: Quads: R = 100 deg flex, L = 90 deg flex  Graciela test: ITB: NT  Hamstring (SLR): R = 70 deg, L = 60 deg    Joint Mobility: mod hypo throughout     Special Tests:   Test Name  Test Result   Prone Instability Test (--)   Lumbar Quadrant test (--)   Straight Leg Raise (+)   Neural Tension Test (Slump) (--)   Crossed Straight Leg Raise (--)   Walking on toes (--)   Walking on heels  (--)   Clonus (--)   DANA (--)   FADIR (--)   SI Joint Provocation Test (compression / distraction) (--)     Functional Movement Analysis:   Gait: I, antalgic  Sit<>stand t/f: I  Bed mobility: I  DL squat: dysfunctional, painful on LLE, increased trunk/hip flex with heels up  SL squat: UA without LOB  Stairs: I but with contralat pelvic drop noted    Balance: SLS <3" with LOB      Intake Outcome Measure for FOTO LSP Survey    Therapist reviewed FOTO scores for Cortes Schroeder on 3/7/2024.   FOTO report - see Media section or FOTO account episode details.             Treatment     Total Treatment time (time-based codes) separate from Evaluation: 23 minutes     Cortes received the treatments listed below:      therapeutic exercises to develop strength, endurance, " "ROM, flexibility, posture, and core stabilization for 00 minutes including:  None today    manual therapy techniques: Joint mobilizations, Myofacial release, and Soft tissue Mobilization were applied to the: LSP for 00 minutes, including:  None today  But consider mechanical traction in the future 2* to lumbar radiculopathy.    neuromuscular re-education activities to improve: Balance, Coordination, Kinesthetic, Sense, Proprioception, and Posture for 00 minutes. The following activities were included:  None today  Begin: TA stabilization program, foot intrinsic and ankle strength/motor control next visit    therapeutic activities to improve functional performance for 23  minutes, including:  Pt edu on dx, pathogenesis, prognosis, PT POC - see below  Pt edu on HEP and importance of frequency, duration, compliance and consistency:   Seated piriformis stretch 3 x 30"  LAQ  x 30 x 5" holds at endrange  Standing heel raises x 30  Standing toe raises x 30  Standing modified downward dog x 10  LTR x 10        Patient Education and Home Exercises     Education provided:   - Patient educated regarding surgical procedure, pathogenesis, diagnosis, protocol, prognosis, POC, and HEP, including use of visual assistance for understanding of anatomy and dysfunction. Written Home Exercises Provided with written and verbal instructions for frequency and duration of the following exercises: see list above. Pt educated on HEP and activity modifications to reduce c/o pain and improve overall function.   - Pt was educated in posture and body mechanics.  Use of a lumbar roll was recommended and demonstrated here today.  Purchase information provided.   - Pt also educated on use of modalities prn to reduce c/o pain and dysfunction.   - Pt educated on clinic's cancellation/no-show policy of missing 3 consecutive PT appointments, which will result in an automatic discharge from therapy services 2* to non-compliance, unless otherwise stated. "   - Patient demo good understanding of the education provided. Patient demo good return demo of skill of exercises.      Written Home Exercises Provided: yes. Exercises were reviewed and Cortes was able to demonstrate them prior to the end of the session.  Cortes demonstrated good  understanding of the education provided. See EMR under Patient Instructions for exercises provided during therapy sessions.    Assessment     Cortes is a 64 y.o. male referred to outpatient Physical Therapy with a medical diagnosis of M51.36 (ICD-10-CM) - DDD (degenerative disc disease), lumbar M47.816 (ICD-10-CM) - Lumbar spondylosis M54.16 (ICD-10-CM) - Lumbar radiculopathy. Patient presents with marked limitations in ROM, joint and myofascial mobility, flexibility, strength, postural awareness/endurance, motor control and coordination. S/s associated with referring diagnosis. Impairments limit pt with all functional activities including standing, walking, ADLs and HHCs.      Patient prognosis is Good.   Patient will benefit from skilled outpatient Physical Therapy to address the deficits stated above and in the chart below, provide patient /family education, and to maximize patientt's level of independence.     Plan of care discussed with patient: Yes  Patient's spiritual, cultural and educational needs considered and patient is agreeable to the plan of care and goals as stated below:     Anticipated Barriers for therapy: standard    Medical Necessity is demonstrated by the following  History  Co-morbidities and personal factors that may impact the plan of care [] LOW: no personal factors / co-morbidities  [] MODERATE: 1-2 personal factors / co-morbidities  [x] HIGH: 3+ personal factors / co-morbidities    Moderate / High Support Documentation:   Co-morbidities affecting plan of care: Anxiety, DM2, GERD, visual deficits, neuropathy, lumbar surgery    Personal Factors:   age  work     Examination  Body Structures and Functions,  activity limitations and participation restrictions that may impact the plan of care [] LOW: addressing 1-2 elements  [] MODERATE: 3+ elements  [x] HIGH: 4+ elements (please support below)    Moderate / High Support Documentation: ROM, flexibility, joint mobility, myofascial mobility, strength, endurance, postural awareness, neural mobility, motor control/coordination, work recreational activities, driving, HHCs     Clinical Presentation [] LOW: stable  [] MODERATE: Evolving  [x] HIGH: Unstable     Decision Making/ Complexity Score: high       Goals:  Short Term Goals (6 Weeks):   1. Pt will report 20% reduction in pain of the lumbar spine and LLE for ease with ADL's (not met, progressing)  2. PT will demonstrate improved trunk strength by a half grade in for ease with upright posture during standing activities. (not met, progressing)  3. Pt will demonstrate improved lumbar spine ROM in all directions by a half grade for ease with bending activities.  (not met, progressing)  4. Pt to demonstrate improved functional ability with FOTO-Mod Oswestry score <=32% disability. (not met, progressing)    Long Term Goals (12 Weeks):   1. Pt will report being independent with HEP for maintenance of improvements gained during therapy sessions (not met, progressing)  2. PT will report 50% reduction of pain of the back and LE for ease with dressing and grooming activities.  (not met, progressing)  3. Pt will demonstrate trunk and extremity strength to >=4+/5 without the provocation of pain for ease with household chores (not met, progressing)  4. Pt will demonstrate appropriate upright posture without external cueing for ease with work related activities.  (not met, progressing)  5. Pt to demonstrate improved functional ability with FOTO limitation <=22% disability. (not met, progressing)    Plan     Plan of care Certification: 3/7/2024 to 6/7/2024.    Outpatient Physical Therapy 2 times weekly for 12 weeks to include the  following interventions: Aquatic Therapy, Cervical/Lumbar Traction, Electrical Stimulation prn with dry needling, Gait Training, Iontophoresis (with dexamethasone prn), Manual Therapy, Moist Heat/ Ice, Neuromuscular Re-ed, Orthotic Management and Training, Patient Education, Self Care, Therapeutic Activities, and Therapeutic Exercise. Progress HEP towards D/C. Recommend F/U with MD if symptoms worsen or do not resolve. Patient may be seen by a PTA for treatment to carry out their plan of care.  Face-to-face conferences will be held.    Osiris Hammonds, PT        Physician's Signature: _________________________________________ Date: ________________

## 2024-03-19 ENCOUNTER — DOCUMENTATION ONLY (OUTPATIENT)
Dept: REHABILITATION | Facility: OTHER | Age: 64
End: 2024-03-19

## 2024-03-19 ENCOUNTER — CLINICAL SUPPORT (OUTPATIENT)
Dept: REHABILITATION | Facility: OTHER | Age: 64
End: 2024-03-19
Payer: COMMERCIAL

## 2024-03-19 DIAGNOSIS — M54.16 LEFT LUMBAR RADICULITIS: Primary | ICD-10-CM

## 2024-03-19 PROCEDURE — 97530 THERAPEUTIC ACTIVITIES: CPT | Mod: PN,CQ

## 2024-03-19 PROCEDURE — 97112 NEUROMUSCULAR REEDUCATION: CPT | Mod: PN,CQ

## 2024-03-19 NOTE — PROGRESS NOTES
Physical Therapist and Physical Therapist Assistant met face to face to discuss patient's treatment plan and progress towards established goals. Pt will be seen by a physical therapist minimally every 6th visit or every 30 days.    Yarely Bird, [facility]  03/19/2024

## 2024-03-19 NOTE — PROGRESS NOTES
[facility]   Physical Therapy Treatment Note      Name: Cortes Schroeder  Clinic Number: [id number]    Therapy Diagnosis:   Encounter Diagnosis   Name Primary?    Left lumbar radiculitis Yes     Physician: Andrew Sinclair Jr*    Visit Date: 3/19/2024    Physician: Andrew Sinclair Jr*     Physician Orders: PT Eval and Treat   Medical Diagnosis from Referral: M51.36 (ICD-10-CM) - DDD (degenerative disc disease), lumbar M47.816 (ICD-10-CM) - Lumbar spondylosis M54.16 (ICD-10-CM) - Lumbar radiculopathy  Evaluation Date: 3/7/2024  Authorization Period Expiration: 03/01/2025  Plan of Care Expiration: 6/7/2024  Progress Note Due: 4/7/2024  Date of Surgery: n/a  Visit # / Visits authorized: 2/21  FOTO: 1/ 3     Precautions: Standard, Anxiety, DM2, GERD, visual deficits, neuropathy, PSHx lumbar (laminectomy L3-5 in 2003)     Time In: 3:40 pm  Time Out: 4:35 pm  Total Billable Time: 55 minutes     PTA Visit #: 1/5       Subjective     Patient reports: soreness and goes into his second toe. States his knees have started hurting with exercises given at al. Back R hip was hurting through last night. States it is crazy because pain will move to different places. States toe subsided a little bit (three days ago, for about 2 days), but started again last night at 8 pm. States he has been trying to do exercises at least once a day. States HEP helps some, but is temporary.     He was somewhat compliant with home exercise program.  Response to previous treatment: good, eval   Functional change: improvements in toe numbness, but came back yesterday     Pain: not taken/10  Location: LBP     Objective      Objective Measures updated at progress report unless specified.     Treatment     Cortes received the treatments listed below:      therapeutic exercises to develop strength, endurance, ROM, flexibility, posture, and core stabilization for 5 minutes including:  Recumbent bike x 5'     warm up  "stretch, prone quad stretch, figure 4 IR/ER hip stretch, bridges, clams, reverse clams, SLR with pilates ring     manual therapy techniques: Joint mobilizations, Myofacial release, Soft tissue Mobilization, and Friction Massage were applied to the:  for 00 minutes, including:  Consider: cupping on LB as needed     neuromuscular re-education activities to improve: Balance, Coordination, Kinesthetic, Sense, Proprioception, and Posture for 25 minutes. The following activities were included:  Supine HS stretch 3 x 30" B   Prone quad stretch 3 x 30" B   Figure 4 ER/IR hip stretch 3 x 30" each B   Sciatic nerve glide x 10 B (modified with R 2* pain)     therapeutic activities to improve functional performance for 25 minutes, including:  Reviewed and educated pt on HEP:   Seated piriformis stretch 3 x 30"   LAQ  x 30 x 5" holds at endrange   Standing heel raises x 30   Standing toe raises x 30   Standing modified downward dog x 10   LTR (on PB)x 20   Bridges + pilates ring press around knees 2 x 10   SL Clams vs YTB 10 x 10" B   SL Reverse clamshells 10 x 10" B   SLR + pilates ring press 2 x 10 B     Patient Education and Home Exercises       Education provided:   - Added clamshells vs YTB and reverse clamshells to HEP     Written Home Exercises Provided: Patient instructed to cont prior HEP. Exercises were reviewed and Cortes was able to demonstrate them prior to the end of the session.  Cortes demonstrated good  understanding of the education provided. See Electronic Medical Record under Patient Instructions for exercises provided during therapy sessions    Assessment     Cortes had overall good tolerance to treatment today with no adverse effects. Required modification of nerve glide on R today 2* pt's c/o pain -- able to tolerate without DF. Pt noted continued soreness in second toe post session. Pt had appropriate training effect with all exercises today. Continue to monitor and progress pt as tolerated -- consider " lumbar traction anahi Kolb Is progressing well towards his goals.   Patient prognosis is Good.     Patient will continue to benefit from skilled outpatient physical therapy to address the deficits listed in the problem list box on initial evaluation, provide pt/family education and to maximize pt's level of independence in the home and community environment.     Patient's spiritual, cultural and educational needs considered and pt agreeable to plan of care and goals.     Anticipated barriers to physical therapy: standard     Goals: updated 03/19/2024   Short Term Goals (6 Weeks):   1. Pt will report 20% reduction in pain of the lumbar spine and LLE for ease with ADL's (not met, progressing)  2. PT will demonstrate improved trunk strength by a half grade in for ease with upright posture during standing activities. (not met, progressing)  3. Pt will demonstrate improved lumbar spine ROM in all directions by a half grade for ease with bending activities.  (not met, progressing)  4. Pt to demonstrate improved functional ability with FOTO-Mod Oswestry score <=32% disability. (not met, progressing)     Long Term Goals (12 Weeks):   1. Pt will report being independent with HEP for maintenance of improvements gained during therapy sessions (not met, progressing)  2. PT will report 50% reduction of pain of the back and LE for ease with dressing and grooming activities.  (not met, progressing)  3. Pt will demonstrate trunk and extremity strength to >=4+/5 without the provocation of pain for ease with household chores (not met, progressing)  4. Pt will demonstrate appropriate upright posture without external cueing for ease with work related activities.  (not met, progressing)  5. Pt to demonstrate improved functional ability with FOTO limitation <=22% disability. (not met, progressing)    Plan     Plan of care Certification: 3/7/2024 to 6/7/2024.      Continue pt's current POC to reduce pain and improve ROM, strength,  and functional performance in LB and B LE.     Yarely Bird, PTA

## 2024-04-02 ENCOUNTER — CLINICAL SUPPORT (OUTPATIENT)
Dept: REHABILITATION | Facility: OTHER | Age: 64
End: 2024-04-02
Payer: COMMERCIAL

## 2024-04-02 DIAGNOSIS — M54.16 LEFT LUMBAR RADICULITIS: Primary | ICD-10-CM

## 2024-04-02 PROCEDURE — 97530 THERAPEUTIC ACTIVITIES: CPT | Mod: PN,CQ

## 2024-04-02 PROCEDURE — 97140 MANUAL THERAPY 1/> REGIONS: CPT | Mod: PN,CQ

## 2024-04-02 NOTE — PROGRESS NOTES
"[facility]   Physical Therapy Treatment Note      Name: Cortes Schroeder  Clinic Number: [id number]    Therapy Diagnosis:   Encounter Diagnosis   Name Primary?    Left lumbar radiculitis Yes     Physician: Andrew Sinclair Jr*    Visit Date: 4/2/2024    Physician: Andrew Sinclair Jr*     Physician Orders: PT Eval and Treat   Medical Diagnosis from Referral: M51.36 (ICD-10-CM) - DDD (degenerative disc disease), lumbar M47.816 (ICD-10-CM) - Lumbar spondylosis M54.16 (ICD-10-CM) - Lumbar radiculopathy  Evaluation Date: 3/7/2024  Authorization Period Expiration: 03/01/2025  Plan of Care Expiration: 6/7/2024  Progress Note Due: 4/7/2024  Date of Surgery: n/a  Visit # / Visits authorized: 3/21  FOTO: 1/ 3     Precautions: Standard, Anxiety, DM2, GERD, visual deficits, neuropathy, PSHx lumbar (laminectomy L3-5 in 2003)     Time In: 3:50 pm  Time Out: 4:33 pm  Total Billable Time: 43 minutes     PTA Visit #: 2/5       Subjective     Patient reports: his pain has migrated to more of the R side of back. States he is only able to do the original exercises due to not being able to find a flat surface. States he is having good nights and bad nights; more bad nights than good nights, "which is nothing new for me." States he leaves [place] on Friday afternoons to his other house in [place].     He was somewhat compliant with home exercise program.  Response to previous treatment: good, eval   Functional change: improvements in toe numbness, but came back yesterday     Pain: 5/10   Location: LBP (middle sacrum)     Objective      Objective Measures updated at progress report unless specified.     Treatment     Cortes received the treatments listed below:      therapeutic exercises to develop strength, endurance, ROM, flexibility, posture, and core stabilization for 00 minutes including:  Recumbent bike x 5'     warm up stretch, prone quad stretch, figure 4 IR/ER hip stretch, bridges, clams, reverse " "clams, SLR with pilates ring     manual therapy techniques: Joint mobilizations, Myofacial release, Soft tissue Mobilization, and Friction Massage were applied to the:  for 15 minutes, including:  Consider: cupping on LB as needed   + cupping to B LB and B QL static and stagnant     neuromuscular re-education activities to improve: Balance, Coordination, Kinesthetic, Sense, Proprioception, and Posture for 00 minutes. The following activities were included:    Prone quad stretch 3 x 30" B   Seated modified Figure 4 ER/IR hip stretch 3 x 30" each B   Seated sciatic nerve glide x 10 B (modified with R 2* pain)     therapeutic activities to improve functional performance for 23 minutes, including:  Reviewed and educated pt on HEP:   Seated piriformis stretch 3 x 30"   LAQ  (seated in chair) x 20 x 5" holds at endrange   Standing heel raises x 30   Standing toe raises x 30   Standing modified downward dog x 10   Bridges + pilates ring press around knees 2 x 10   Seated Clams vs YTB 3-way x 30 ea   Seated Reverse clamshells 10 x 10" B   Seated HS stretch 3 x 30" B   LTR (on PB)x 2 min   SLR + pilates ring press 2 x 10 B     Patient Education and Home Exercises       Education provided:   - Added clamshells vs YTB and reverse clamshells to HEP     Written Home Exercises Provided: Patient instructed to cont prior HEP. Exercises were reviewed and Cortes was able to demonstrate them prior to the end of the session.  Cortes demonstrated good  understanding of the education provided. See Electronic Medical Record under Patient Instructions for exercises provided during therapy sessions    Assessment     Cortes had overall good tolerance to treatment today with no adverse effects. Modified exercises to seated and standing today per pt's request and inability to find a flat surface to perform HEP. Educated pt on HEP modifications. Initiated cupping today to LB -- monitor affects nv and continue as needed. Continue to monitor " and progress pt as tolerated. Consider mechanical lumbar traction.     Cortes Is progressing well towards his goals.   Patient prognosis is Good.     Patient will continue to benefit from skilled outpatient physical therapy to address the deficits listed in the problem list box on initial evaluation, provide pt/family education and to maximize pt's level of independence in the home and community environment.     Patient's spiritual, cultural and educational needs considered and pt agreeable to plan of care and goals.     Anticipated barriers to physical therapy: standard     Goals: updated 04/02/2024   Short Term Goals (6 Weeks):   1. Pt will report 20% reduction in pain of the lumbar spine and LLE for ease with ADL's (not met, progressing)  2. PT will demonstrate improved trunk strength by a half grade in for ease with upright posture during standing activities. (not met, progressing)  3. Pt will demonstrate improved lumbar spine ROM in all directions by a half grade for ease with bending activities.  (not met, progressing)  4. Pt to demonstrate improved functional ability with FOTO-Mod Oswestry score <=32% disability. (not met, progressing)     Long Term Goals (12 Weeks):   1. Pt will report being independent with HEP for maintenance of improvements gained during therapy sessions (not met, progressing)  2. PT will report 50% reduction of pain of the back and LE for ease with dressing and grooming activities.  (not met, progressing)  3. Pt will demonstrate trunk and extremity strength to >=4+/5 without the provocation of pain for ease with household chores (not met, progressing)  4. Pt will demonstrate appropriate upright posture without external cueing for ease with work related activities.  (not met, progressing)  5. Pt to demonstrate improved functional ability with FOTO limitation <=22% disability. (not met, progressing)    Plan     Plan of care Certification: 3/7/2024 to 6/7/2024.      Continue pt's current  POC to reduce pain and improve ROM, strength, and functional performance in LB and B LE.     Yarely Bird, PTA

## 2024-04-09 ENCOUNTER — CLINICAL SUPPORT (OUTPATIENT)
Dept: REHABILITATION | Facility: OTHER | Age: 64
End: 2024-04-09
Payer: COMMERCIAL

## 2024-04-09 DIAGNOSIS — M54.16 LEFT LUMBAR RADICULITIS: Primary | ICD-10-CM

## 2024-04-09 PROCEDURE — 97140 MANUAL THERAPY 1/> REGIONS: CPT | Mod: PN,CQ

## 2024-04-09 PROCEDURE — 97530 THERAPEUTIC ACTIVITIES: CPT | Mod: PN,CQ

## 2024-04-09 NOTE — PROGRESS NOTES
"[facility]   Physical Therapy Treatment Note      Name: Cortes Schroeder  Clinic Number: [id number]    Therapy Diagnosis:   Encounter Diagnosis   Name Primary?    Left lumbar radiculitis Yes       Physician: Andrew Sinclair Jr*    Visit Date: 4/9/2024    Physician: Andrew Sinclair Jr*     Physician Orders: PT Eval and Treat   Medical Diagnosis from Referral: M51.36 (ICD-10-CM) - DDD (degenerative disc disease), lumbar M47.816 (ICD-10-CM) - Lumbar spondylosis M54.16 (ICD-10-CM) - Lumbar radiculopathy  Evaluation Date: 3/7/2024  Authorization Period Expiration: 03/01/2025  Plan of Care Expiration: 6/7/2024  Progress Note Due: 4/7/2024  Date of Surgery: n/a  Visit # / Visits authorized: 4/21  FOTO: 1/ 3     Precautions: Standard, Anxiety, DM2, GERD, visual deficits, neuropathy, PSHx lumbar (laminectomy L3-5 in 2003)     Time In: 3:50 pm  Time Out: 4:30 pm  Total Billable Time: 40 minutes     PTA Visit #: 2/5       Subjective     Patient reports: he is feeling sore from the weekend. 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 States his pain is more on the R side than the L today. States he struggles with seated clamshells with no engaging the ankle. Inquiry regarding cups to upper thoracic mm nv.      He was somewhat compliant with home exercise program.  Response to previous treatment: good response to cupping: states "it felt looser"   Functional change: improvements in toe numbness, but came back yesterday     Pain: 3/10   Location: LBP (middle sacrum)     Objective      Objective Measures updated at progress report unless specified.     Treatment     Cortes received the treatments listed below:      therapeutic exercises to develop strength, endurance, ROM, flexibility, posture, and core stabilization for 00 minutes including:  Recumbent bike x 5'     warm up stretch, " "prone quad stretch, figure 4 IR/ER hip stretch, bridges, clams, reverse clams, SLR with pilates ring     manual therapy techniques: Joint mobilizations, Myofacial release, Soft tissue Mobilization, and Friction Massage were applied to the:  for 25 minutes, including:  Consider: cupping on LB as needed   cupping and IASTM to B LB and B QL static and stagnant     neuromuscular re-education activities to improve: Balance, Coordination, Kinesthetic, Sense, Proprioception, and Posture for 00 minutes. The following activities were included:    Prone quad stretch 3 x 30" B   Seated modified Figure 4 ER/IR hip stretch 3 x 30" each B   Seated sciatic nerve glide x 10 B (modified with R 2* pain)     therapeutic activities to improve functional performance for 15 minutes, including:  Reviewed and educated pt on HEP:   Seated piriformis stretch 3 x 30"   LAQ  (seated in chair) x 20 x 5" holds at endrange   Standing heel raises x 30   Standing toe raises x 30   Standing modified downward dog x 10   Bridges + pilates ring press around knees 2 x 10   Seated Clams vs YTB 3-way x 30 ea   Seated Reverse clamshells 10 x 10" B   Seated HS stretch 3 x 30" B   LTR (on PB)x 2 min   + PPT x 20   + PPT + pilates ring press into R/L LE x 20 ea   SLR + pilates ring press 2 x 10 B     Patient Education and Home Exercises       Education provided:   - Added clamshells vs YTB and reverse clamshells to HEP     Written Home Exercises Provided: Patient instructed to cont prior HEP. Exercises were reviewed and Cortes was able to demonstrate them prior to the end of the session.  Cortes demonstrated good  understanding of the education provided. See Electronic Medical Record under Patient Instructions for exercises provided during therapy sessions    Assessment     Continued manual therapy interventions today due to good response last visit. Initiated core motor control and coordination at end of session, which pt had moderate difficulty with, but " achieved good training effect. Continue to monitor and progress pt as tolerated. Consider cupping to T-spine and neck per pt's request 2* mm tightness nv.     Cortes Is progressing well towards his goals.   Patient prognosis is Good.     Patient will continue to benefit from skilled outpatient physical therapy to address the deficits listed in the problem list box on initial evaluation, provide pt/family education and to maximize pt's level of independence in the home and community environment.     Patient's spiritual, cultural and educational needs considered and pt agreeable to plan of care and goals.     Anticipated barriers to physical therapy: standard     Goals: updated 04/09/2024   Short Term Goals (6 Weeks):   1. Pt will report 20% reduction in pain of the lumbar spine and LLE for ease with ADL's (not met, progressing)  2. PT will demonstrate improved trunk strength by a half grade in for ease with upright posture during standing activities. (not met, progressing)  3. Pt will demonstrate improved lumbar spine ROM in all directions by a half grade for ease with bending activities.  (not met, progressing)  4. Pt to demonstrate improved functional ability with FOTO-Mod Oswestry score <=32% disability. (not met, progressing)     Long Term Goals (12 Weeks):   1. Pt will report being independent with HEP for maintenance of improvements gained during therapy sessions (not met, progressing)  2. PT will report 50% reduction of pain of the back and LE for ease with dressing and grooming activities.  (not met, progressing)  3. Pt will demonstrate trunk and extremity strength to >=4+/5 without the provocation of pain for ease with household chores (not met, progressing)  4. Pt will demonstrate appropriate upright posture without external cueing for ease with work related activities.  (not met, progressing)  5. Pt to demonstrate improved functional ability with FOTO limitation <=22% disability. (not met,  progressing)    Plan     Plan of care Certification: 3/7/2024 to 6/7/2024.      Continue pt's current POC to reduce pain and improve ROM, strength, and functional performance in LB and B LE.     Yarely Bird, PTA

## 2024-04-11 ENCOUNTER — CLINICAL SUPPORT (OUTPATIENT)
Dept: REHABILITATION | Facility: OTHER | Age: 64
End: 2024-04-11
Payer: COMMERCIAL

## 2024-04-11 DIAGNOSIS — M54.16 LEFT LUMBAR RADICULITIS: Primary | ICD-10-CM

## 2024-04-11 PROCEDURE — 97530 THERAPEUTIC ACTIVITIES: CPT | Mod: PN

## 2024-04-11 PROCEDURE — 97140 MANUAL THERAPY 1/> REGIONS: CPT | Mod: PN

## 2024-04-11 NOTE — PROGRESS NOTES
"[facility]   Physical Therapy Treatment Note      Name: Cortes Schroeder  Clinic Number: [id number]    Therapy Diagnosis:   Encounter Diagnosis   Name Primary?    Left lumbar radiculitis Yes     Physician: Andrew Sinclair Jr*    Visit Date: 4/11/2024    Physician: Andrew Sinclair Jr*     Physician Orders: PT Eval and Treat   Medical Diagnosis from Referral: M51.36 (ICD-10-CM) - DDD (degenerative disc disease), lumbar M47.816 (ICD-10-CM) - Lumbar spondylosis M54.16 (ICD-10-CM) - Lumbar radiculopathy  Evaluation Date: 3/7/2024  Authorization Period Expiration: 03/01/2025  Plan of Care Expiration: 6/7/2024  Progress Note Due: updated to 5/7/2024  Date of Surgery: n/a  Visit # / Visits authorized: 5/21  FOTO: 1/ 3     Precautions: Standard, Anxiety, DM2, GERD, visual deficits, neuropathy, PSHx lumbar (laminectomy L3-5 in 2003)     Time In: 3:00 pm  Time Out: 3:45 pm  Total Billable Time: 45 minutes     PTA Visit #: 2/5       Subjective     Patient reports: continued pain and difficulty with prolonged driving. Says that the drive to his [place] home is 2.5 hrs but he is unable to sit >30-40 min without taking a rest break to get up and walk around, stretch. Overall, sx feel about the same since beginning therapy. Says that his back pain remains the same across the back, notes newer pain radiating into the R glute [indicates iliac crest]. Says that the tingling down to the L foot/toes has resolved.    He was somewhat compliant with home exercise program.  Response to previous treatment: good response to cupping: states "it felt looser"   Functional change: improvements in toe numbness, but came back yesterday     Pain: 3/10   Location: LBP (middle sacrum)     Objective      Objective Measures updated at progress report unless specified.     Updated 4/11/2024       Thoracic/Lumbar AROM: Pain/Dysfunction with Movement:   Flexion  Mod cisneros, fingertips to mid-tibia, stiffness across low back with " "poor reversal of lumbar curve, tension down post L leg     Repeated standing: no change  Repeated supine: no change   Extension  Max cisneros, poor lumbar curve, UA to extend hips > neutral, stiffness in LSP     Repeated standing: no change  Repeated prone: NT 2* to time constraints   Right side bending Mod-max cisneros, decreased LSP curve, fingertips >2" above knee joint line, c/o pulling pain in low back (L), LLE   Left side bending Mod-max cisneros, decreased LSP curve, fingertips >2" above knee joint line, c/o stiffness in low back   Right rotation Max cisneros, <25%, c/o pulling pain in L low back   Left rotation Max cisneros, <25%, c/o stiffness in low back      Hip ROM: mod-max cisneros narda ER > IR, hip ext = lacks 5 deg  Knee ROM: 0-5-120     Lower Extremity Strength  Right LE   Left LE     Hip flexion: 4/5 Hip flexion: 4-/5   Hip extension:  4/5 Hip extension: 4/5   Hip abduction: 4/5 Hip abduction: 4-/5   Hip adduction:  4+/5 Hip adduction:  4+/5   Hip Internal rotation    4+/5 Hip Internal rotation 4/5   Knee Flexion 5/5 Knee Flexion 4/5 - notes anterior knee pain   Knee Extension 5/5 Knee Extension 4-/5   Ankle dorsiflexion: 5/5 Ankle dorsiflexion: 4-/5   Ankle plantarflexion: 5/5 Ankle plantarflexion: 4-/5      Flexibility:   Demetri test: Hip flexors: mod cisneros L>R  Ely's: Quads: R = 100 deg flex, L = 90 deg flex  Graciela test: ITB: NT  Hamstring (SLR): R = 70 deg, L = 60 deg     Joint Mobility: mod hypo throughout      Special Tests:   Test Name  Test Result   Prone Instability Test (--)   Lumbar Quadrant test (--)   Straight Leg Raise (+)   Neural Tension Test (Slump) (--)   Crossed Straight Leg Raise (--)   Walking on toes (--)   Walking on heels  (--)   Clonus (--)   DANA (--)   FADIR (--)   SI Joint Provocation Test (compression / distraction) (--)      Functional Movement Analysis:   Gait: I, antalgic  Sit<>stand t/f: I  Bed mobility: I  DL squat: dysfunctional, painful on LLE, increased trunk/hip flex with heels up  SL squat: UA " "without LOB  Stairs: I but with contralat pelvic drop noted     Balance: SLS <3" with LOB        Intake Outcome Measure for FOTO LSP Survey     Therapist reviewed FOTO scores for Cortes Schroeder on 4/11/2024.   FOTO report - see Media section or FOTO account episode details.                Treatment     Cortes received the treatments listed below:      therapeutic exercises to develop strength, endurance, ROM, flexibility, posture, and core stabilization for 00 minutes including:  Recumbent bike x 5'     warm up stretch, prone quad stretch, figure 4 IR/ER hip stretch, bridges, clams, reverse clams, SLR with pilates ring     manual therapy techniques: Joint mobilizations, Myofacial release, Soft tissue Mobilization, and Friction Massage were applied to the: LSP for 20 minutes, including:  Consider: cupping on LB as needed   cupping and IASTM to B LB and B QL static and stagnant   20 min x dry needling - see note by Osiris Gunter, PT    neuromuscular re-education activities to improve: Balance, Coordination, Kinesthetic, Sense, Proprioception, and Posture for 00 minutes. The following activities were included:    Prone quad stretch 3 x 30" B   Seated modified Figure 4 ER/IR hip stretch 3 x 30" each B   Seated sciatic nerve glide x 10 B (modified with R 2* pain)     therapeutic activities to improve functional performance for 25 minutes, including:  Reassessment   PPT x 20   PPT + pilates ring press into R/L LE x 20 ea    Reviewed and educated pt on HEP:   Seated piriformis stretch 3 x 30"   LAQ  (seated in chair) x 20 x 5" holds at endrange   Standing heel raises x 30   Standing toe raises x 30   Standing modified downward dog x 10   Bridges + pilates ring press around knees 2 x 10   Seated Clams vs YTB 3-way x 30 ea   Seated Reverse clamshells 10 x 10" B   Seated HS stretch 3 x 30" B   LTR (on PB)x 2 min      SLR + pilates ring press 2 x 10 B     Patient Education and Home Exercises       Education provided:   - " Added clamshells vs YTB and reverse clamshells to HEP     Written Home Exercises Provided: Patient instructed to cont prior HEP. Exercises were reviewed and Cortes was able to demonstrate them prior to the end of the session.  Cortes demonstrated good  understanding of the education provided. See Electronic Medical Record under Patient Instructions for exercises provided during therapy sessions    Assessment     Reassessment performed with minimal improvement in trunk AROM, flexibility, and strength due to difficulty with attending PT because of work schedule difficulties. Initiated dry needling today as pt had good improvements with acupuncture in the past and notes min improvement with cupping now. Good tolerance with good improvements in soft tissue quality by end of session but pt verbalized min improvement in mobility afterwards. Similar limitations in self assessment of tolerance with functional activities also seen with min change in FOTO score. Plan to progress trunk, abdominal, and hip strength and overall stabilization next visit as tolerated.       Cortes Is progressing well towards his goals.   Patient prognosis is Good.     Patient will continue to benefit from skilled outpatient physical therapy to address the deficits listed in the problem list box on initial evaluation, provide pt/family education and to maximize pt's level of independence in the home and community environment.     Patient's spiritual, cultural and educational needs considered and pt agreeable to plan of care and goals.     Anticipated barriers to physical therapy: standard     Goals: updated 04/11/2024   Short Term Goals (6 Weeks):   1. Pt will report 20% reduction in pain of the lumbar spine and LLE for ease with ADL's (not met, progressing)  2. PT will demonstrate improved trunk strength by a half grade in for ease with upright posture during standing activities. (not met, progressing)  3. Pt will demonstrate improved lumbar  spine ROM in all directions by a half grade for ease with bending activities.  (not met, progressing)  4. Pt to demonstrate improved functional ability with FOTO-Mod Oswestry score <=32% disability. (not met, progressing)     Long Term Goals (12 Weeks):   1. Pt will report being independent with HEP for maintenance of improvements gained during therapy sessions (not met, progressing)  2. PT will report 50% reduction of pain of the back and LE for ease with dressing and grooming activities.  (not met, progressing)  3. Pt will demonstrate trunk and extremity strength to >=4+/5 without the provocation of pain for ease with household chores (not met, progressing)  4. Pt will demonstrate appropriate upright posture without external cueing for ease with work related activities.  (not met, progressing)  5. Pt to demonstrate improved functional ability with FOTO limitation <=22% disability. (not met, progressing)    Plan     Plan of care Certification: 3/7/2024 to 6/7/2024.      Continue pt's current POC to reduce pain and improve ROM, strength, and functional performance in LB and B LE.     Osiris Hammonds, PT

## 2024-04-11 NOTE — PROGRESS NOTES
Physical Therapy Functional Dry Needling Note      Date:  4/11/2024    Name: Cortes Schroeder  Clinic Number: [id number]    Therapy Diagnosis:   Encounter Diagnosis   Name Primary?    Left lumbar radiculitis Yes     Physician: Andrew Sinclair Jr*    Total Time:  20 min    Subjective      Pt reports: pain remains consistent across the low back and into the R sacrum despite stretching.  See note by Osiris Hammonds, PT     Pain: 3/10  Location: bilateral low back, R sacrum      Objective      See EMR under MEDIA for written consent provided, signed, dated on 4/11/2024     Palpation Assessment to determine the necessity for Functional Dry Needling.    Cortes received the following manual therapy techniques: dry needling to B LSP with 2.5-3 in needles with no adverse effects.    Homeostatic points:  1. Deep Radial  2. Greater Auricular  3. Spinal Accessory  4. Saphenous  5. Deep Fibular  6. Tibial  7. Greater Occipital  8. Suprascapular ( infraspinatus)  9. Lateral Antebrachial Cutaneous  10. Sural  11. Lateral Popliteal  12. Superficial Radial  13. Dorsal Scapular  14. Superior Cluneal - R only  15. Posterior Cutaneous L 2  16. Inferior Gluteal -- R next visit?  17. Lateral Pectoral  18. Ilitotibal  19. Infraorbital  20. Spinous process T7  21. Posterior cutaneous  T6  22. Posterior cutaneous L 5  23. Supraorbital  24. Common fibular    Paravertebral Points:  Steven lumbar spine paraspinals (x3 B), and Steven multifidi (x 3 B)    Symptomatic Points:   Steven QL    Assessment      Patient demonstrated appropriate response to Functional Dry Needling. Poor MFM to R>L lower LSP paraspinals vs QL. Winding technique used every 5 minutes. Good improvement in MF mobility and overall ROM by end of session, but pt verbalizes min change in sx afterwards.        Home Exercises and Patient Education      Education provided:   Purpose, benefits, and potential side effects of dry needling.   Educated pt to use heat following treatment  "sessions to reduce c/o pain or soreness and to improve circulation to needled sites.   Encouraged pt to continue with HEP to maintain flexibility, ROM, and functional mobility.    Written Handout on response to FDN provided: none provided today.    Cortes demonstrated good understanding of the education provided.     See EMR under "Patient Instructions" for exercises provided.    Plan      Monitor response to Functional Dry Needling. Continue with Functional Dry Needling in POC as appropriate.       Osiris Hammonds, PT  4/11/2024            "

## 2024-04-16 ENCOUNTER — CLINICAL SUPPORT (OUTPATIENT)
Dept: REHABILITATION | Facility: OTHER | Age: 64
End: 2024-04-16
Payer: COMMERCIAL

## 2024-04-16 DIAGNOSIS — M54.16 LEFT LUMBAR RADICULITIS: Primary | ICD-10-CM

## 2024-04-16 PROCEDURE — 97140 MANUAL THERAPY 1/> REGIONS: CPT | Mod: PN | Performed by: PHYSICAL THERAPIST

## 2024-04-16 PROCEDURE — 97530 THERAPEUTIC ACTIVITIES: CPT | Mod: PN | Performed by: PHYSICAL THERAPIST

## 2024-04-16 NOTE — PROGRESS NOTES
"[facility]   Physical Therapy Treatment Note      Name: Cortes Schroeder  Clinic Number: [id number]    Therapy Diagnosis:   Encounter Diagnosis   Name Primary?    Left lumbar radiculitis Yes     Physician: Andrew Sinclair Jr*    Visit Date: 4/16/2024    Physician: Andrew Sinclair Jr*     Physician Orders: PT Eval and Treat   Medical Diagnosis from Referral: M51.36 (ICD-10-CM) - DDD (degenerative disc disease), lumbar M47.816 (ICD-10-CM) - Lumbar spondylosis M54.16 (ICD-10-CM) - Lumbar radiculopathy  Evaluation Date: 3/7/2024  Authorization Period Expiration: 03/01/2025  Plan of Care Expiration: 6/7/2024  Progress Note Due: updated to 5/7/2024  Date of Surgery: n/a  Visit # / Visits authorized: 6/21  FOTO: 1/ 3     Precautions: Standard, Anxiety, DM2, GERD, visual deficits, neuropathy, PSHx lumbar (laminectomy L3-5 in 2003)     Time In: 1500  Time Out: 1545  Total Billable Time: 45 minutes     PTA Visit #: 0/5       Subjective     Patient reports: he started a new BP medication at his doctor's insistence, and is have adverse reactions. He says medication was causing increased back pain, hot flashes, chest pains, etc. He stopped the med and will reach out to notify MD. He said the drive today was more painful because of that. He did have some relief after dry needling last week. Friday night he started having pain at cuticle of both great toes, thinks it could be related to his neuropathy.     He was somewhat compliant with home exercise program.  Response to previous treatment: good response to cupping: states "it felt looser"   Functional change: improvements in toe numbness, but came back yesterday     Pain: 3/10   Location: LBP (middle sacrum)     Objective      Objective Measures updated at progress report unless specified.     Updated 4/11/2024       Thoracic/Lumbar AROM: Pain/Dysfunction with Movement:   Flexion  Mod cisneros, fingertips to mid-tibia, stiffness across low back " "with poor reversal of lumbar curve, tension down post L leg     Repeated standing: no change  Repeated supine: no change   Extension  Max cisneros, poor lumbar curve, UA to extend hips > neutral, stiffness in LSP     Repeated standing: no change  Repeated prone: NT 2* to time constraints   Right side bending Mod-max cisneros, decreased LSP curve, fingertips >2" above knee joint line, c/o pulling pain in low back (L), LLE   Left side bending Mod-max cisneros, decreased LSP curve, fingertips >2" above knee joint line, c/o stiffness in low back   Right rotation Max cisneros, <25%, c/o pulling pain in L low back   Left rotation Max cisneros, <25%, c/o stiffness in low back      Hip ROM: mod-max cisneros narda ER > IR, hip ext = lacks 5 deg  Knee ROM: 0-5-120     Lower Extremity Strength  Right LE   Left LE     Hip flexion: 4/5 Hip flexion: 4-/5   Hip extension:  4/5 Hip extension: 4/5   Hip abduction: 4/5 Hip abduction: 4-/5   Hip adduction:  4+/5 Hip adduction:  4+/5   Hip Internal rotation    4+/5 Hip Internal rotation 4/5   Knee Flexion 5/5 Knee Flexion 4/5 - notes anterior knee pain   Knee Extension 5/5 Knee Extension 4-/5   Ankle dorsiflexion: 5/5 Ankle dorsiflexion: 4-/5   Ankle plantarflexion: 5/5 Ankle plantarflexion: 4-/5      Flexibility:   Demetri test: Hip flexors: mod cisneros L>R  Ely's: Quads: R = 100 deg flex, L = 90 deg flex  Graciela test: ITB: NT  Hamstring (SLR): R = 70 deg, L = 60 deg     Joint Mobility: mod hypo throughout      Special Tests:   Test Name  Test Result   Prone Instability Test (--)   Lumbar Quadrant test (--)   Straight Leg Raise (+)   Neural Tension Test (Slump) (--)   Crossed Straight Leg Raise (--)   Walking on toes (--)   Walking on heels  (--)   Clonus (--)   DANA (--)   FADIR (--)   SI Joint Provocation Test (compression / distraction) (--)      Functional Movement Analysis:   Gait: I, antalgic  Sit<>stand t/f: I  Bed mobility: I  DL squat: dysfunctional, painful on LLE, increased trunk/hip flex with heels up  SL " "squat: UA without LOB  Stairs: I but with contralat pelvic drop noted     Balance: SLS <3" with LOB        Intake Outcome Measure for FOTO LSP Survey     Therapist reviewed FOTO scores for Cortes Schroeder on 4/11/2024.   FOTO report - see Media section or FOTO account episode details.                Treatment     Cortes received the treatments listed below:      therapeutic exercises to develop strength, endurance, ROM, flexibility, posture, and core stabilization for 00 minutes including:  Recumbent bike x 5'     warm up stretch, prone quad stretch, figure 4 IR/ER hip stretch, bridges, clams, reverse clams, SLR with pilates ring     manual therapy techniques: Joint mobilizations, Myofacial release, Soft tissue Mobilization, and Friction Massage were applied to the: LSP for 20 minutes, including:  Consider: cupping on LB as needed   cupping and IASTM to B LB and B QL static and stagnant   20 min x Application of TDN: Pt educated on benefits and potential side effects of dry needling. Educated pt on benefits, precautions, side effects following TDN. Educated pt to use heat following treatment sessions if pt is experiencing pain or soreness. Pt verbalized good understanding of education.  Pt signed written consent to dry needling. Pt gave verbal consent for DN    Pt received dry needling to the below listed muscles using 50 mm needles.  L3-5 alt PS/multifidi  Winding performed every 5 minutes during treatment.      neuromuscular re-education activities to improve: Balance, Coordination, Kinesthetic, Sense, Proprioception, and Posture for 00 minutes. The following activities were included:    Prone quad stretch 3 x 30" B   Seated modified Figure 4 ER/IR hip stretch 3 x 30" each B   Seated sciatic nerve glide x 10 B (modified with R 2* pain)     therapeutic activities to improve functional performance for 25 minutes, including:  Reassessment   TrA 5" x 10  PPT x 10 (attempted with heavy cuing)  PPT + pilates ring " "press into R/L LE x 20 ea  +BKFO 2 x 10    Reviewed and educated pt on HEP:   Seated piriformis stretch 3 x 30"   LAQ  (seated in chair) x 20 x 5" holds at endrange   Standing heel raises x 30   Standing toe raises x 30   Standing modified downward dog x 10   Bridges + pilates ring press around knees 2 x 10   Seated Clams vs YTB 3-way x 30 ea   Seated Reverse clamshells 10 x 10" B   Seated HS stretch 3 x 30" B   LTR (on PB)x 2 min      SLR + pilates ring press 2 x 10 B     Patient Education and Home Exercises       Education provided:   - Added clamshells vs YTB and reverse clamshells to HEP     Written Home Exercises Provided: Patient instructed to cont prior HEP. Exercises were reviewed and Cortes was able to demonstrate them prior to the end of the session.  Cortes demonstrated good  understanding of the education provided. See Electronic Medical Record under Patient Instructions for exercises provided during therapy sessions    Assessment     Pt presents with  c/o increased pain and "brain fog" related to recent medication, but stated that he felt well enough to proceed with treatment. Therex kept light to avoid symptom exacerbation. Pt had significant difficulty with mechanics of posterior pelvic tilt, unable to consistently perform. Good return demonstration with TrA isolation and addition of BKFO. Dry needling continued today per pt request. Good soft tissue response to dry needling evident by increased grasp with unilateral winding at all insertion points. Winding performed every 5 minutes during treatment. No adverse effects following treatment.  Pt able to ambulate independently from clinic, reports some decrease in lumbar pain.       Cortes Is progressing well towards his goals.   Patient prognosis is Good.     Patient will continue to benefit from skilled outpatient physical therapy to address the deficits listed in the problem list box on initial evaluation, provide pt/family education and to " maximize pt's level of independence in the home and community environment.     Patient's spiritual, cultural and educational needs considered and pt agreeable to plan of care and goals.     Anticipated barriers to physical therapy: standard     Goals: updated 04/16/2024   Short Term Goals (6 Weeks):   1. Pt will report 20% reduction in pain of the lumbar spine and LLE for ease with ADL's (not met, progressing)  2. PT will demonstrate improved trunk strength by a half grade in for ease with upright posture during standing activities. (not met, progressing)  3. Pt will demonstrate improved lumbar spine ROM in all directions by a half grade for ease with bending activities.  (not met, progressing)  4. Pt to demonstrate improved functional ability with FOTO-Mod Oswestry score <=32% disability. (not met, progressing)     Long Term Goals (12 Weeks):   1. Pt will report being independent with HEP for maintenance of improvements gained during therapy sessions (not met, progressing)  2. PT will report 50% reduction of pain of the back and LE for ease with dressing and grooming activities.  (not met, progressing)  3. Pt will demonstrate trunk and extremity strength to >=4+/5 without the provocation of pain for ease with household chores (not met, progressing)  4. Pt will demonstrate appropriate upright posture without external cueing for ease with work related activities.  (not met, progressing)  5. Pt to demonstrate improved functional ability with FOTO limitation <=22% disability. (not met, progressing)    Plan     Plan of care Certification: 3/7/2024 to 6/7/2024.      Continue pt's current POC to reduce pain and improve ROM, strength, and functional performance in LB and B LE.     Meghana Fonseca, PT

## 2024-04-23 ENCOUNTER — CLINICAL SUPPORT (OUTPATIENT)
Dept: REHABILITATION | Facility: OTHER | Age: 64
End: 2024-04-23
Payer: COMMERCIAL

## 2024-04-23 DIAGNOSIS — M54.16 LEFT LUMBAR RADICULITIS: Primary | ICD-10-CM

## 2024-04-23 PROCEDURE — 97530 THERAPEUTIC ACTIVITIES: CPT | Mod: PN

## 2024-04-23 PROCEDURE — 97140 MANUAL THERAPY 1/> REGIONS: CPT | Mod: PN

## 2024-04-29 NOTE — PROGRESS NOTES
"[facility]   Physical Therapy Treatment Note      Name: Cortes Schroeder  Clinic Number: [id number]    Therapy Diagnosis:   Encounter Diagnosis   Name Primary?    Left lumbar radiculitis Yes     Physician: Andrew Sinclair Jr*    Visit Date: 4/23/2024    Physician: Andrew Sinclair Jr*     Physician Orders: PT Eval and Treat   Medical Diagnosis from Referral: M51.36 (ICD-10-CM) - DDD (degenerative disc disease), lumbar M47.816 (ICD-10-CM) - Lumbar spondylosis M54.16 (ICD-10-CM) - Lumbar radiculopathy  Evaluation Date: 3/7/2024  Authorization Period Expiration: 03/01/2025  Plan of Care Expiration: 6/7/2024  Progress Note Due: updated to 5/7/2024  Date of Surgery: n/a  Visit # / Visits authorized: 7/21  FOTO: 1/ 3     Precautions: Standard, Anxiety, DM2, GERD, visual deficits, neuropathy, PSHx lumbar (laminectomy L3-5 in 2003)     Time In: 300pm  Time Out: 345pm  Total Billable Time: 45 minutes     PTA Visit #: 0/5       Subjective     Patient reports: having worsening toe pain, back pain today.     He was somewhat compliant with home exercise program.  Response to previous treatment: good response to cupping: states "it felt looser"   Functional change: improvements in toe numbness, but came back yesterday     Pain: 3/10   Location: LBP (middle sacrum)     Objective      Objective Measures updated at progress report unless specified.     Updated 4/11/2024       Thoracic/Lumbar AROM: Pain/Dysfunction with Movement:   Flexion  Mod cisneros, fingertips to mid-tibia, stiffness across low back with poor reversal of lumbar curve, tension down post L leg     Repeated standing: no change  Repeated supine: no change   Extension  Max cisneros, poor lumbar curve, UA to extend hips > neutral, stiffness in LSP     Repeated standing: no change  Repeated prone: NT 2* to time constraints   Right side bending Mod-max cisneros, decreased LSP curve, fingertips >2" above knee joint line, c/o pulling pain in low back " "(L), LLE   Left side bending Mod-max cisneros, decreased LSP curve, fingertips >2" above knee joint line, c/o stiffness in low back   Right rotation Max cisneros, <25%, c/o pulling pain in L low back   Left rotation Max cisneros, <25%, c/o stiffness in low back      Hip ROM: mod-max cisneros narda ER > IR, hip ext = lacks 5 deg  Knee ROM: 0-5-120     Lower Extremity Strength  Right LE   Left LE     Hip flexion: 4/5 Hip flexion: 4-/5   Hip extension:  4/5 Hip extension: 4/5   Hip abduction: 4/5 Hip abduction: 4-/5   Hip adduction:  4+/5 Hip adduction:  4+/5   Hip Internal rotation    4+/5 Hip Internal rotation 4/5   Knee Flexion 5/5 Knee Flexion 4/5 - notes anterior knee pain   Knee Extension 5/5 Knee Extension 4-/5   Ankle dorsiflexion: 5/5 Ankle dorsiflexion: 4-/5   Ankle plantarflexion: 5/5 Ankle plantarflexion: 4-/5      Flexibility:   Demetri test: Hip flexors: mod cisneros L>R  Ely's: Quads: R = 100 deg flex, L = 90 deg flex  Graciela test: ITB: NT  Hamstring (SLR): R = 70 deg, L = 60 deg     Joint Mobility: mod hypo throughout      Special Tests:   Test Name  Test Result   Prone Instability Test (--)   Lumbar Quadrant test (--)   Straight Leg Raise (+)   Neural Tension Test (Slump) (--)   Crossed Straight Leg Raise (--)   Walking on toes (--)   Walking on heels  (--)   Clonus (--)   DANA (--)   FADIR (--)   SI Joint Provocation Test (compression / distraction) (--)      Functional Movement Analysis:   Gait: I, antalgic  Sit<>stand t/f: I  Bed mobility: I  DL squat: dysfunctional, painful on LLE, increased trunk/hip flex with heels up  SL squat: UA without LOB  Stairs: I but with contralat pelvic drop noted     Balance: SLS <3" with LOB        Intake Outcome Measure for FOTO LSP Survey     Therapist reviewed FOTO scores for Cortes Schroeder on 4/11/2024.   FOTO report - see Media section or FOTO account episode details.                Treatment     Cortes received the treatments listed below:      therapeutic exercises to develop strength, " "endurance, ROM, flexibility, posture, and core stabilization for 00 minutes including:  Recumbent bike x 5'     warm up stretch, prone quad stretch, figure 4 IR/ER hip stretch, bridges, clams, reverse clams, SLR with pilates ring     manual therapy techniques: Joint mobilizations, Myofacial release, Soft tissue Mobilization, and Friction Massage were applied to the: LSP for 25 minutes, including:  Consider: cupping on LB as needed   cupping and IASTM to B LB and B QL static and stagnant   20 min x Application of TDN: Pt educated on benefits and potential side effects of dry needling. Educated pt on benefits, precautions, side effects following TDN. Educated pt to use heat following treatment sessions if pt is experiencing pain or soreness. Pt verbalized good understanding of education.  Pt signed written consent to dry needling. Pt gave verbal consent for DN    Pt received dry needling to the below listed muscles using 50 mm needles.  L3-5 alt PS/multifidi  Winding performed every 5 minutes during treatment.    (+) manual lumbar traction x 5'     neuromuscular re-education activities to improve: Balance, Coordination, Kinesthetic, Sense, Proprioception, and Posture for 00 minutes. The following activities were included:    Prone quad stretch 3 x 30" B   Seated modified Figure 4 ER/IR hip stretch 3 x 30" each B   Seated sciatic nerve glide x 10 B (modified with R 2* pain)     therapeutic activities to improve functional performance for 20 minutes, including:  TrA 5" x 10  PPT x 10 (attempted with heavy cuing)  PPT + pilates ring press into R/L LE x 20 ea  BKFO 2 x 10    Reviewed and educated pt on HEP:   Seated piriformis stretch 3 x 30"   LAQ  (seated in chair) x 20 x 5" holds at endrange   Standing heel raises x 30   Standing toe raises x 30   Standing modified downward dog x 10   Bridges + pilates ring press around knees 2 x 10   Seated Clams vs YTB 3-way x 30 ea   Seated Reverse clamshells 10 x 10" B   Seated HS " "stretch 3 x 30" B   LTR (on PB)x 2 min      SLR + pilates ring press 2 x 10 B     Patient Education and Home Exercises       Education provided:   - Added clamshells vs YTB and reverse clamshells to HEP     Written Home Exercises Provided: Patient instructed to cont prior HEP. Exercises were reviewed and Cortes was able to demonstrate them prior to the end of the session.  Cortes demonstrated good  understanding of the education provided. See Electronic Medical Record under Patient Instructions for exercises provided during therapy sessions    Assessment     Pt notes worsening of LE pain and pain into the toes today. Attempted to initiate manual lumbar traction and long axis distraction for sx relief, but pt notes worsening sx instead. Attempted to progress stabilization program without relief. Resumed dry needling today with only slight relief of LBP, no relief in c/o toe pain. Advised pt to f/u with MD if sx worsen or do not resolve.      Cortes Is progressing well towards his goals.   Patient prognosis is Good.     Patient will continue to benefit from skilled outpatient physical therapy to address the deficits listed in the problem list box on initial evaluation, provide pt/family education and to maximize pt's level of independence in the home and community environment.     Patient's spiritual, cultural and educational needs considered and pt agreeable to plan of care and goals.     Anticipated barriers to physical therapy: standard     Goals: updated 04/29/2024   Short Term Goals (6 Weeks):   1. Pt will report 20% reduction in pain of the lumbar spine and LLE for ease with ADL's (not met, progressing)  2. PT will demonstrate improved trunk strength by a half grade in for ease with upright posture during standing activities. (not met, progressing)  3. Pt will demonstrate improved lumbar spine ROM in all directions by a half grade for ease with bending activities.  (not met, progressing)  4. Pt to demonstrate " improved functional ability with FOTO-Mod Oswestry score <=32% disability. (not met, progressing)     Long Term Goals (12 Weeks):   1. Pt will report being independent with HEP for maintenance of improvements gained during therapy sessions (not met, progressing)  2. PT will report 50% reduction of pain of the back and LE for ease with dressing and grooming activities.  (not met, progressing)  3. Pt will demonstrate trunk and extremity strength to >=4+/5 without the provocation of pain for ease with household chores (not met, progressing)  4. Pt will demonstrate appropriate upright posture without external cueing for ease with work related activities.  (not met, progressing)  5. Pt to demonstrate improved functional ability with FOTO limitation <=22% disability. (not met, progressing)    Plan     Plan of care Certification: 3/7/2024 to 6/7/2024.      Continue pt's current POC to reduce pain and improve ROM, strength, and functional performance in LB and B LE.     Osiris Hammonds, PT

## 2024-05-01 ENCOUNTER — OFFICE VISIT (OUTPATIENT)
Dept: PAIN MEDICINE | Facility: CLINIC | Age: 64
End: 2024-05-01
Payer: COMMERCIAL

## 2024-05-01 VITALS — DIASTOLIC BLOOD PRESSURE: 65 MMHG | SYSTOLIC BLOOD PRESSURE: 115 MMHG | HEART RATE: 81 BPM | OXYGEN SATURATION: 99 %

## 2024-05-01 DIAGNOSIS — E11.42 DIABETIC PERIPHERAL NEUROPATHY ASSOCIATED WITH TYPE 2 DIABETES MELLITUS: ICD-10-CM

## 2024-05-01 DIAGNOSIS — M51.36 DDD (DEGENERATIVE DISC DISEASE), LUMBAR: Primary | ICD-10-CM

## 2024-05-01 DIAGNOSIS — M54.16 LUMBAR RADICULOPATHY: ICD-10-CM

## 2024-05-01 DIAGNOSIS — M47.816 LUMBAR SPONDYLOSIS: ICD-10-CM

## 2024-05-01 DIAGNOSIS — M54.16 LUMBAR RADICULOPATHY, CHRONIC: ICD-10-CM

## 2024-05-01 PROCEDURE — 3078F DIAST BP <80 MM HG: CPT | Mod: CPTII,S$GLB,,

## 2024-05-01 PROCEDURE — 99214 OFFICE O/P EST MOD 30 MIN: CPT | Mod: S$GLB,,,

## 2024-05-01 PROCEDURE — 3074F SYST BP LT 130 MM HG: CPT | Mod: CPTII,S$GLB,,

## 2024-05-01 PROCEDURE — 1159F MED LIST DOCD IN RCRD: CPT | Mod: CPTII,S$GLB,,

## 2024-05-01 PROCEDURE — 99999 PR PBB SHADOW E&M-EST. PATIENT-LVL V: CPT | Mod: PBBFAC,,,

## 2024-05-01 PROCEDURE — 4010F ACE/ARB THERAPY RXD/TAKEN: CPT | Mod: CPTII,S$GLB,,

## 2024-05-01 PROCEDURE — 3046F HEMOGLOBIN A1C LEVEL >9.0%: CPT | Mod: CPTII,S$GLB,,

## 2024-05-01 PROCEDURE — 1160F RVW MEDS BY RX/DR IN RCRD: CPT | Mod: CPTII,S$GLB,,

## 2024-05-01 RX ORDER — TIZANIDINE 4 MG/1
8 TABLET ORAL EVERY 6 HOURS PRN
Qty: 60 TABLET | Refills: 0 | Status: SHIPPED | OUTPATIENT
Start: 2024-05-01 | End: 2024-05-31

## 2024-05-01 NOTE — PROGRESS NOTES
Subjective:     Patient ID: Cortes Schroeder is a 64 y.o. male    Chief Complaint: Follow-up      Referred by: No ref. provider found      HPI:    Interval History PA (05/01/2024):  Patient returns to clinic for follow up of bilateral lower back and extremity pain.  Since previous visit he has been attending physical therapy with some benefit although denies significant improvement in his pain levels.  He does note improvement specifically in his left lower extremity pain.  Pain throughout his left lower extremity is overall tolerable although he does continue to note paresthesia over his distal left anterior shin as well as significant pain in his left big toe.  Also with continued bilateral lower back pain, right worse than left.  Pain is located across his lower lumbar paraspinal region.  Denies significant pain into his lower extremities at this time although has had radicular symptoms present in the past.  He does note difficulty sleeping, frequently waking up due to increased pain.  Denies any weakness, bowel or bladder dysfunction.    Interval History (3/1/24):  He returns today for follow up.  He has been having severe left lower extremity pain.  Pain extends in the left leg and into his 2nd and 3rd toe.  Pain is sharp and burning.  Associated tingling.  Pain is constant.  Significantly disrupts sleep.  Does have some mild associated left-sided low back pain, but actually has more right-sided low back pain at this time.  Denies any right lower extremity symptoms.     Interval History (1/29/19):  He returns today for follow up.  He reports that C7-T1 interlaminar epidural steroid injection has been helpful for the neck and upper extremity pain. He reports about 85% relief of the symptoms.  He is happy with this relief and does not feel as though any further interventions are needed for his neck.  However he does wish to discuss his chronic low back pain today.  He reports having chronic low back pain and is  status post lumbar surgery.  The pain is located across the lower lumbar region more prominent on the right side.  The pain will radiate down the posterior right thigh and leg with associated numbness and tingling.  He reports intermittent weakness during activity but denies any focal weakness or bowel or bladder dysfunction.      Initial Encounter (1/8/19):  Cortes Schroeder is a 64 y.o. male who presents today with chronic neck pain R>L and bilateral upper extremity pain, numbness and weakness. This has been present for at least one year. The pain is located in the cervical region and radiates to the right arm, forearm and hand. There is associated numbness in this area. He reports that his right upper extremity is also weak. He denies any weakness in the left upper extremity. He denies b/b dysfunction. The pain is constant and worsened with activity.    This pain is described in detail below.    Physical Therapy: Yes.  Not recently.    Non-pharmacologic Treatment: rest helps         TENS?: no    Pain Medications:         Currently taking: Gabapentin, Cymbalta, nortriptyline    Has tried in the past:      Has not tried: Opioids, NSAIDs, Tylenol, Muscle relaxants, topical creams    Blood thinners: ASA 81mg daily    Interventional Therapies:   1/16/19 - C7-T1 interlaminar epidural steroid injection - 85% relief noted    Relevant Surgeries:   Previous low back surgery no other details specified at this time    Affecting sleep? Yes    Affecting daily activities? yes    Depressive symptoms? yes          SI/HI? No    Work status: Employed    Pain Scores:    Best:       6/10  Worst:     9/10  Usually:   5/10  Today:    6/10    Pain Disability Index  Family/Home Responsibilities:: 6  Recreation:: 7  Social Activity:: 6  Occupation:: 7  Sexual Behavior:: 10  Self Care:: 2  Life-Support Activities:: 6  Pain Disability Index (PDI): 44      Review of Systems   Constitutional:  Negative for activity change, appetite change,  chills, fatigue, fever and unexpected weight change.   HENT:  Negative for hearing loss.    Eyes:  Negative for visual disturbance.   Respiratory:  Negative for chest tightness and shortness of breath.    Cardiovascular:  Negative for chest pain.   Gastrointestinal:  Negative for abdominal pain, constipation, diarrhea, nausea and vomiting.   Genitourinary:  Negative for difficulty urinating.   Musculoskeletal:  Positive for arthralgias, back pain and gait problem. Negative for myalgias, neck pain and neck stiffness.   Skin:  Negative for rash.   Neurological:  Positive for numbness. Negative for dizziness, weakness, light-headedness and headaches.   Psychiatric/Behavioral:  Positive for sleep disturbance. Negative for hallucinations and suicidal ideas. The patient is not nervous/anxious.        Past Medical History:   Diagnosis Date    Anxiety 12/18/2012    Back pain 12/18/2012    Cataract     Chronic pain syndrome 4/24/2016    Diabetes type 2, controlled 2/20/2016    Diabetic retinopathy     DM (diabetes mellitus) 12/18/2012    DM (diabetes mellitus), type 2, uncontrolled 11/16/2013    Essential hypertension 2/20/2016    Gastroesophageal reflux disease without esophagitis 2/20/2016    Hyperlipidemia 12/18/2012    Insomnia 8/7/2014    Neuropathy 11/16/2013       Past Surgical History:   Procedure Laterality Date    BACK SURGERY  2003    Lumbar Spine    CATARACT EXTRACTION      EPIDURAL STEROID INJECTION N/A 1/16/2019    Procedure: Injection, Steroid, Epidural Cervical;  Surgeon: Andrew Sinclair Jr., MD;  Location: [facility];  Service: Pain Management;  Laterality: N/A;  Cervical Epidural Steroid Injection C7-T1    06399    Arrive @ 1240    EPIDURAL STEROID INJECTION N/A 2/20/2019    Procedure: Injection, Steroid, Epidural;  Surgeon: Andrew Sinclair Jr., MD;  Location: [facility] ENDO;  Service: Pain Management;  Laterality: N/A;  Lumbar Epidural Steroid Injection L4-5    36446    Arrive @ 1215 (requests latest  time)       Social History     Socioeconomic History    Marital status:    Tobacco Use    Smoking status: Never    Smokeless tobacco: Never   Substance and Sexual Activity    Alcohol use: Yes     Alcohol/week: 1.0 standard drink of alcohol     Types: 1 Glasses of wine per week     Comment: occasionally    Drug use: No    Sexual activity: Not Currently     Partners: Male     Birth control/protection: Condom     Comment: 10/2/17      Social Determinants of Health     Financial Resource Strain: Patient Declined (2/23/2024)    Overall Financial Resource Strain (CARDIA)     Difficulty of Paying Living Expenses: Patient declined   Food Insecurity: Patient Declined (2/23/2024)    Hunger Vital Sign     Worried About Running Out of Food in the Last Year: Patient declined     Ran Out of Food in the Last Year: Patient declined   Transportation Needs: No Transportation Needs (2/23/2024)    PRAPARE - Transportation     Lack of Transportation (Medical): No     Lack of Transportation (Non-Medical): No   Physical Activity: Insufficiently Active (2/23/2024)    Exercise Vital Sign     Days of Exercise per Week: 3 days     Minutes of Exercise per Session: 10 min   Stress: Stress Concern Present (2/23/2024)    [employer] - Occupational Stress Questionnaire     Feeling of Stress : To some extent   Housing Stability: Low Risk  (2/23/2024)    Housing Stability Vital Sign     Unable to Pay for Housing in the Last Year: No     Number of Places Lived in the Last Year: 1     Unstable Housing in the Last Year: No       Review of patient's allergies indicates:   Allergen Reactions    Invokana [canagliflozin] Anaphylaxis    Percocet [oxycodone-acetaminophen] Nausea Only and Hallucinations    Biaxin [clarithromycin]     Hydrocodone Other (See Comments)     Dizzy/nausea/hallucinations    Sulfa (sulfonamide antibiotics) Nausea Only and Rash       Current Outpatient Medications on File Prior to Visit    Medication Sig Dispense Refill    cinnamon bark (CINNAMON) 500 mg capsule Take 500 mg by mouth 2 (two) times a day.      DULoxetine (CYMBALTA) 60 MG capsule Take 1 capsule (60 mg total) by mouth once daily. 90 capsule 0    emtricitabine-tenofovir 200-300 mg (TRUVADA) 200-300 mg Tab Take 1 tablet by mouth once daily. 30 tablet 0    fish oil-omega-3 fatty acids 300-1,000 mg capsule Take by mouth 2 (two) times daily.      gabapentin (NEURONTIN) 100 MG capsule Take 1 capsule (100 mg total) by mouth once daily. 90 capsule 3    gabapentin (NEURONTIN) 600 MG tablet Take 2 tablets (1,200 mg total) by mouth every evening. 180 tablet 3    HUMALOG U-100 INSULIN 100 unit/mL injection Use in insulin pump; max dose 150 units per day. 10 mL 11    insulin pump cartridge (OMNIPOD DASH 5 PACK POD) Crtg Apply new pod to skin every 1.5 days 60 each 3    insulin pump controller (OMNIPOD DASH PDM KIT MISC) by Misc.(Non-Drug; Combo Route) route.      lancets Misc To check BG 4-7 times daily, to use with insurance preferred meter 100 each 6    losartan (COZAAR) 50 MG tablet TAKE 1 TABLET(50 MG) BY MOUTH EVERY DAY 90 tablet 3    magnesium oxide-Mg AA chelate (MG-PLUS-PROTEIN) 133 mg Tab Take 500 mg by mouth every evening.       nortriptyline (PAMELOR) 50 MG capsule Take 1 capsule (50 mg total) by mouth nightly. 90 capsule 0    OPW TEST CLAIM - DO NOT FILL Inject into the skin. OPW test claim. Do not fill. 50 mL 0    rosuvastatin (CRESTOR) 5 MG tablet TAKE 1 TABLET(5 MG) BY MOUTH EVERY DAY 90 tablet 3    vitamin D 1000 units Tab Take 185 mg by mouth 2 (two) times daily.       blood-glucose sensor (DEXCOM G6 SENSOR) [place] Change sensor every 10 days 3 each PRN    blood-glucose transmitter (DEXCOM G6 TRANSMITTER) [place] Change transmitter every 3 months 1 each PRN    [DISCONTINUED] insulin aspart U-100 (NOVOLOG U-100 INSULIN ASPART) 100 unit/mL injection Use in insulin pump. Max daily dose 150 units. 50 mL 5     No current facility-administered  medications on file prior to visit.       Objective:      /65 (BP Location: Right arm, Patient Position: Sitting, BP Method: Medium (Automatic))   Pulse 81   SpO2 99%     Exam:  GEN:  Well developed, well nourished.  No acute distress.  Normal pain behavior.  HEENT:  No trauma.  Mucous membranes moist.  Nares patent bilaterally.  PSYCH: Normal affect. Thought content appropriate.  CHEST:  Breathing symmetric.  No audible wheezing.  ABD: Soft, non-distended.  SKIN:  Warm, pink, dry.  No rash on exposed areas.    EXT:  No cyanosis, clubbing, or edema.  No color change or changes in nail or hair growth.  NEURO/MUSCULOSKELETAL:  Fully alert, oriented, and appropriate. Speech normal tasha. No cranial nerve deficits.   Gait:  Normal.  No trendelenburg sign bilaterally.           Imaging:    Narrative     EXAMINATION:  MRI LUMBAR SPINE WITHOUT CONTRAST    CLINICAL HISTORY:  radicular leg pain; Arthrodesis status    TECHNIQUE:  Multiplanar, multisequence MR images were acquired from the thoracolumbar junction to the sacrum without the administration of contrast.    COMPARISON:  None.    FINDINGS:  Small portions of this exam are limited by patient motion.    The vertebral bodies maintain normal height, signal intensity and alignment.  There is multilevel disc desiccation with loss of disc height identified at L4-L5 and L5-S1.  This is most advanced at L4-L5.  The conus terminates at level L1.  Evaluation of the localizer images and structures surrounding the lumbar spine reveal no abnormalities.  There is mild central canal stenosis throughout likely related to congenitally shortened pedicles.    L1-L2: No significant disc or joint pathology.    L2-L3: Mild diffuse disc bulge present.  No facet arthropathy or ligamentum flavum hypertrophy.  The central canal and neural foramen are patent.    L3-L4: Mild diffuse disc bulge with very mild bilateral facet arthropathy and some ligamentum flavum hypertrophy.  There is  mild left neural foraminal stenosis.  The right neural foramen is patent.    L4-L5: There is a large diffuse disc bulge with moderate to severe bilateral facet arthropathy and no significant ligamentum flavum hypertrophy.  No significant central canal stenosis however, there is moderate bilateral neural foraminal stenosis.    L5-S1: Mild diffuse disc bulge.  Moderate to severe right and mild-to-moderate left facet arthropathy present.  No significant ligamentum flavum hypertrophy.  The neural foramen are patent.   Impression       Multilevel degenerative disc and joint disease is present.  Congenitally shortened pedicles likely contributes to the mild central canal stenosis throughout the lumbar spine.  No severe central canal or neural foraminal stenosis at any level.      Electronically signed by: Linda Sadler MD  Date: 08/13/2018  Time: 13:54         Narrative     History: Cervicalgia.    Procedure: Sagittal T1, T2, STIR sequences and axial T2-weighted sequence and a gradient echo sequence is performed.    Comparison study none    Findings:    There is normal cervical lordosis. No marrow replacing lesions or fractures noted. Craniovertebral alignment is within normal limits. There is no Chiari type malformations. The signal intensity within the cervical cord is within normal limits.    C2-3: No disc herniations or spinal stenosis.    C3-4: There is disc dehydration. No significant disc herniations or spinal stenosis. There is at least moderate right foraminal stenosis and mild left foraminal stenosis. No cord compression.    C4-5: Approximately 2 mm posterior subluxation of C4 on C5 associated with disc dehydration and a posterior disc bulge and osteophyte complex. Effacement of the anterior subarachnoid space with slight dorsal displacement and compression of the cord is noted (image 10 series 7 and 6). There is severe bilateral foraminal stenosis. At least mild central canal spinal stenosis.    C5-6: Severe  disc degeneration with disc space narrowing and global anterior and posterior osteophyte and disc bulge complex. There is posterior displacement of the cervical cord with slight flattening of the cervical cord. There is central canal spinal stenosis. There is severe right foraminal stenosis and moderate left foraminal stenosis.    C6-7: Mild broad-based left paracentral disc herniation and osteophyte complex without cord compression or spinal stenosis. There is a moderate to severe left foraminal stenosis and moderate right foraminal stenosis.    C7-T1: No significant central canal spinal stenosis or disc herniations or foraminal stenosis.   Impression         1. Multilevel degenerative disc disease as above. There is spinal stenosis and foraminal stenotic changes more pronounced at C4-5 and C5-6 disc spaces. See details at each disc space level above.      Electronically signed by: DELL TELLEZ MD  Date: 12/12/17  Time: 14:27          Assessment:       Encounter Diagnoses   Name Primary?    DDD (degenerative disc disease), lumbar Yes    Lumbar spondylosis     Lumbar radiculopathy     Diabetic peripheral neuropathy associated with type 2 diabetes mellitus     Lumbar radiculopathy, chronic              Plan:       Cortes was seen today for follow-up.    Diagnoses and all orders for this visit:    DDD (degenerative disc disease), lumbar  -     MRI Lumbar Spine Without Contrast; Future  -     tiZANidine (ZANAFLEX) 4 MG tablet; Take 2 tablets (8 mg total) by mouth every 6 (six) hours as needed (as needed for muscle spasms).    Lumbar spondylosis  -     MRI Lumbar Spine Without Contrast; Future    Lumbar radiculopathy    Diabetic peripheral neuropathy associated with type 2 diabetes mellitus    Lumbar radiculopathy, chronic  -     MRI Lumbar Spine Without Contrast; Future            Cortes Schroeder is a 64 y.o. male with severe left lower extremity pain.  Subjectively neuropathic in nature.  Concerning for lumbar  radicular pain.  Does have back pain.  Bilateral, but worse on the right side.    Prior records reviewed.  Pertinent imaging studies reviewed by me. Imaging results were discussed with patient.  Ordered lumbar MRI to get updated imaging.  Continue gabapentin.  Patient currently taking 100 mg in the morning and typically takes 300 mg q.h.s. although often requires upward of 1200 mg at night in order to get relief to help him sleep.  Start tizanidine 4 mg q.h.s. p.r.n..  Stressed the importance of maintaining regular home exercise program and being mindful of how they use their back throughout the day.  Patient expressed understanding and agreement.  Return to clinic after imaging to discuss results.  May consider interventional procedures at that time.      Alf Mercado PA-C  [facility]  Interventional Pain Management  05/01/2024    This note was created by combination of typed  and M-Modal dictation.  Transcription and phonetic errors may be present.  If there are any questions, please contact me.

## 2024-05-18 ENCOUNTER — HOSPITAL ENCOUNTER (OUTPATIENT)
Dept: RADIOLOGY | Facility: HOSPITAL | Age: 64
Discharge: HOME | End: 2024-05-18
Payer: COMMERCIAL

## 2024-05-18 DIAGNOSIS — M47.816 LUMBAR SPONDYLOSIS: ICD-10-CM

## 2024-05-18 DIAGNOSIS — M54.16 LUMBAR RADICULOPATHY, CHRONIC: ICD-10-CM

## 2024-05-18 DIAGNOSIS — M51.36 DDD (DEGENERATIVE DISC DISEASE), LUMBAR: ICD-10-CM

## 2024-05-18 PROCEDURE — 72148 MRI LUMBAR SPINE W/O DYE: CPT | Mod: TC

## 2024-05-18 PROCEDURE — 72148 MRI LUMBAR SPINE W/O DYE: CPT | Mod: 26,,, | Performed by: RADIOLOGY

## 2024-05-22 ENCOUNTER — OFFICE VISIT (OUTPATIENT)
Dept: PAIN MEDICINE | Facility: CLINIC | Age: 64
End: 2024-05-22
Payer: COMMERCIAL

## 2024-05-22 VITALS — HEART RATE: 79 BPM | OXYGEN SATURATION: 99 % | DIASTOLIC BLOOD PRESSURE: 74 MMHG | SYSTOLIC BLOOD PRESSURE: 183 MMHG

## 2024-05-22 DIAGNOSIS — M47.816 LUMBAR SPONDYLOSIS: ICD-10-CM

## 2024-05-22 DIAGNOSIS — M54.16 LUMBAR RADICULOPATHY: ICD-10-CM

## 2024-05-22 DIAGNOSIS — E11.42 DIABETIC PERIPHERAL NEUROPATHY ASSOCIATED WITH TYPE 2 DIABETES MELLITUS: ICD-10-CM

## 2024-05-22 DIAGNOSIS — M51.36 DDD (DEGENERATIVE DISC DISEASE), LUMBAR: Primary | ICD-10-CM

## 2024-05-22 PROCEDURE — 4010F ACE/ARB THERAPY RXD/TAKEN: CPT | Mod: CPTII,S$GLB,,

## 2024-05-22 PROCEDURE — 3046F HEMOGLOBIN A1C LEVEL >9.0%: CPT | Mod: CPTII,S$GLB,,

## 2024-05-22 PROCEDURE — 3077F SYST BP >= 140 MM HG: CPT | Mod: CPTII,S$GLB,,

## 2024-05-22 PROCEDURE — 1159F MED LIST DOCD IN RCRD: CPT | Mod: CPTII,S$GLB,,

## 2024-05-22 PROCEDURE — 99214 OFFICE O/P EST MOD 30 MIN: CPT | Mod: S$GLB,,,

## 2024-05-22 PROCEDURE — 99999 PR PBB SHADOW E&M-EST. PATIENT-LVL IV: CPT | Mod: PBBFAC,,,

## 2024-05-22 PROCEDURE — 3078F DIAST BP <80 MM HG: CPT | Mod: CPTII,S$GLB,,

## 2024-05-22 PROCEDURE — 1160F RVW MEDS BY RX/DR IN RCRD: CPT | Mod: CPTII,S$GLB,,

## 2024-05-22 NOTE — H&P (VIEW-ONLY)
Subjective:     Patient ID: Cortes Schroeder is a 64 y.o. male    Chief Complaint: Follow-up      Referred by: No ref. provider found      HPI:    Interval History PA (05/22/2024):  Patient returns to clinic for follow up of bilateral lower back and extremity pain and MRI review.  Denies significant changes in the quality or location of pain since previous visit.  Denies new or worsening symptoms.  Continues to note pain primarily across his lower lumbar paraspinal region, worse on the right.  Previously with significant left lower extremity pain however this has been improved with physical therapy.  He does continue to report intermittent left lower extremity symptoms/pain but overall has been more tolerable.  Also with intermittent paresthesia over his left anterior shin into his left foot.  He does continue to express pain over his toes bilaterally, worse on the left.  Notes difficulty ADLs, pain increases with prolonged standing or walking.  Denies weakness, bowel or bladder dysfunction.    Interval History PA (05/01/2024):  Patient returns to clinic for follow up of bilateral lower back and extremity pain.  Since previous visit he has been attending physical therapy with some benefit although denies significant improvement in his pain levels.  He does note improvement specifically in his left lower extremity pain.  Pain throughout his left lower extremity is overall tolerable although he does continue to note paresthesia over his distal left anterior shin as well as significant pain in his left big toe.  Also with continued bilateral lower back pain, right worse than left.  Pain is located across his lower lumbar paraspinal region.  Denies significant pain into his lower extremities at this time although has had radicular symptoms present in the past.  He does note difficulty sleeping, frequently waking up due to increased pain.  Denies any weakness, bowel or bladder dysfunction.    Interval History  (3/1/24):  He returns today for follow up.  He has been having severe left lower extremity pain.  Pain extends in the left leg and into his 2nd and 3rd toe.  Pain is sharp and burning.  Associated tingling.  Pain is constant.  Significantly disrupts sleep.  Does have some mild associated left-sided low back pain, but actually has more right-sided low back pain at this time.  Denies any right lower extremity symptoms.     Interval History (1/29/19):  He returns today for follow up.  He reports that C7-T1 interlaminar epidural steroid injection has been helpful for the neck and upper extremity pain. He reports about 85% relief of the symptoms.  He is happy with this relief and does not feel as though any further interventions are needed for his neck.  However he does wish to discuss his chronic low back pain today.  He reports having chronic low back pain and is status post lumbar surgery.  The pain is located across the lower lumbar region more prominent on the right side.  The pain will radiate down the posterior right thigh and leg with associated numbness and tingling.  He reports intermittent weakness during activity but denies any focal weakness or bowel or bladder dysfunction.      Initial Encounter (1/8/19):  Cortes Schroeder is a 64 y.o. male who presents today with chronic neck pain R>L and bilateral upper extremity pain, numbness and weakness. This has been present for at least one year. The pain is located in the cervical region and radiates to the right arm, forearm and hand. There is associated numbness in this area. He reports that his right upper extremity is also weak. He denies any weakness in the left upper extremity. He denies b/b dysfunction. The pain is constant and worsened with activity.    This pain is described in detail below.    Physical Therapy: Yes.      Non-pharmacologic Treatment: rest helps         TENS?: no    Pain Medications:         Currently taking: Gabapentin, Cymbalta,  nortriptyline    Has tried in the past:      Has not tried: Opioids, NSAIDs, Tylenol, Muscle relaxants, topical creams    Blood thinners: ASA 81mg daily    Interventional Therapies:   1/16/19 - C7-T1 interlaminar epidural steroid injection - 85% relief noted    Relevant Surgeries:   Previous low back surgery no other details specified at this time    Affecting sleep? Yes    Affecting daily activities? yes    Depressive symptoms? yes          SI/HI? No    Work status: Employed    Pain Scores:    Best:       4/10  Worst:     8/10  Usually:   6/10  Today:    3/10    Pain Disability Index  Family/Home Responsibilities:: 8  Recreation:: 8  Social Activity:: 8  Occupation:: 8  Sexual Behavior:: 8  Self Care:: 8  Life-Support Activities:: 6  Pain Disability Index (PDI): 54      Review of Systems   Constitutional:  Negative for activity change, appetite change, chills, fatigue, fever and unexpected weight change.   HENT:  Negative for hearing loss.    Eyes:  Negative for visual disturbance.   Respiratory:  Negative for chest tightness and shortness of breath.    Cardiovascular:  Negative for chest pain.   Gastrointestinal:  Negative for abdominal pain, constipation, diarrhea, nausea and vomiting.   Genitourinary:  Negative for difficulty urinating.   Musculoskeletal:  Positive for arthralgias, back pain and gait problem. Negative for myalgias, neck pain and neck stiffness.   Skin:  Negative for rash.   Neurological:  Positive for numbness. Negative for dizziness, weakness, light-headedness and headaches.   Psychiatric/Behavioral:  Positive for sleep disturbance. Negative for hallucinations and suicidal ideas. The patient is not nervous/anxious.        Past Medical History:   Diagnosis Date    Anxiety 12/18/2012    Back pain 12/18/2012    Cataract     Chronic pain syndrome 4/24/2016    Diabetes type 2, controlled 2/20/2016    Diabetic retinopathy     DM (diabetes mellitus) 12/18/2012    DM (diabetes mellitus), type 2,  uncontrolled 11/16/2013    Essential hypertension 2/20/2016    Gastroesophageal reflux disease without esophagitis 2/20/2016    Hyperlipidemia 12/18/2012    Insomnia 8/7/2014    Neuropathy 11/16/2013       Past Surgical History:   Procedure Laterality Date    BACK SURGERY  2003    Lumbar Spine    CATARACT EXTRACTION      EPIDURAL STEROID INJECTION N/A 1/16/2019    Procedure: Injection, Steroid, Epidural Cervical;  Surgeon: Andrew Sinclair Jr., MD;  Location: [facility] ENDO;  Service: Pain Management;  Laterality: N/A;  Cervical Epidural Steroid Injection C7-T1    53291    Arrive @ 1240    EPIDURAL STEROID INJECTION N/A 2/20/2019    Procedure: Injection, Steroid, Epidural;  Surgeon: Andrew Sinclair Jr., MD;  Location: [facility] ENDO;  Service: Pain Management;  Laterality: N/A;  Lumbar Epidural Steroid Injection L4-5    36896    Arrive @ 1215 (requests latest time)       Social History     Socioeconomic History    Marital status:    Tobacco Use    Smoking status: Never    Smokeless tobacco: Never   Substance and Sexual Activity    Alcohol use: Yes     Alcohol/week: 1.0 standard drink of alcohol     Types: 1 Glasses of wine per week     Comment: occasionally    Drug use: No    Sexual activity: Not Currently     Partners: Male     Birth control/protection: Condom     Comment: 10/2/17      Social Determinants of Health     Financial Resource Strain: Patient Declined (2/23/2024)    Overall Financial Resource Strain (CARDIA)     Difficulty of Paying Living Expenses: Patient declined   Food Insecurity: Patient Declined (2/23/2024)    Hunger Vital Sign     Worried About Running Out of Food in the Last Year: Patient declined     Ran Out of Food in the Last Year: Patient declined   Transportation Needs: No Transportation Needs (2/23/2024)    PRAPARE - Transportation     Lack of Transportation (Medical): No     Lack of Transportation (Non-Medical): No   Physical Activity: Insufficiently Active (2/23/2024)    Exercise  Vital Sign     Days of Exercise per Week: 3 days     Minutes of Exercise per Session: 10 min   Stress: Stress Concern Present (2/23/2024)    [employer] - Occupational Stress Questionnaire     Feeling of Stress : To some extent   Housing Stability: Low Risk  (2/23/2024)    Housing Stability Vital Sign     Unable to Pay for Housing in the Last Year: No     Number of Places Lived in the Last Year: 1     Unstable Housing in the Last Year: No       Review of patient's allergies indicates:   Allergen Reactions    Invokana [canagliflozin] Anaphylaxis    Percocet [oxycodone-acetaminophen] Nausea Only and Hallucinations    Biaxin [clarithromycin]     Hydrocodone Other (See Comments)     Dizzy/nausea/hallucinations    Sulfa (sulfonamide antibiotics) Nausea Only and Rash       Current Outpatient Medications on File Prior to Visit   Medication Sig Dispense Refill    cinnamon bark (CINNAMON) 500 mg capsule Take 500 mg by mouth 2 (two) times a day.      DULoxetine (CYMBALTA) 60 MG capsule Take 1 capsule (60 mg total) by mouth once daily. 90 capsule 0    emtricitabine-tenofovir 200-300 mg (TRUVADA) 200-300 mg Tab Take 1 tablet by mouth once daily. 30 tablet 0    fish oil-omega-3 fatty acids 300-1,000 mg capsule Take by mouth 2 (two) times daily.      gabapentin (NEURONTIN) 100 MG capsule Take 1 capsule (100 mg total) by mouth once daily. 90 capsule 3    gabapentin (NEURONTIN) 600 MG tablet Take 2 tablets (1,200 mg total) by mouth every evening. 180 tablet 3    HUMALOG U-100 INSULIN 100 unit/mL injection Use in insulin pump; max dose 150 units per day. 10 mL 11    insulin pump cartridge (OMNIPOD DASH 5 PACK POD) Crtg Apply new pod to skin every 1.5 days 60 each 3    insulin pump controller (OMNIPOD DASH PDM KIT MISC) by Misc.(Non-Drug; Combo Route) route.      L-CITRULLINE MISC by Misc.(Non-Drug; Combo Route) route Daily.      lancets Misc To check BG 4-7 times daily, to use with insurance preferred  meter 100 each 6    losartan (COZAAR) 50 MG tablet TAKE 1 TABLET(50 MG) BY MOUTH EVERY DAY 90 tablet 3    magnesium oxide-Mg AA chelate (MG-PLUS-PROTEIN) 133 mg Tab Take 500 mg by mouth every evening.       nortriptyline (PAMELOR) 50 MG capsule Take 1 capsule (50 mg total) by mouth nightly. 90 capsule 0    OPW TEST CLAIM - DO NOT FILL Inject into the skin. OPW test claim. Do not fill. 50 mL 0    rosuvastatin (CRESTOR) 5 MG tablet TAKE 1 TABLET(5 MG) BY MOUTH EVERY DAY 90 tablet 3    tiZANidine (ZANAFLEX) 4 MG tablet Take 2 tablets (8 mg total) by mouth every 6 (six) hours as needed (as needed for muscle spasms). 60 tablet 0    vitamin D 1000 units Tab Take 185 mg by mouth 2 (two) times daily.       blood-glucose sensor (DEXCOM G6 SENSOR) [place] Change sensor every 10 days 3 each PRN    blood-glucose transmitter (DEXCOM G6 TRANSMITTER) [place] Change transmitter every 3 months 1 each PRN    [DISCONTINUED] insulin aspart U-100 (NOVOLOG U-100 INSULIN ASPART) 100 unit/mL injection Use in insulin pump. Max daily dose 150 units. 50 mL 5     No current facility-administered medications on file prior to visit.       Objective:      BP (!) 183/74 (BP Location: Left arm, Patient Position: Sitting, BP Method: Medium (Automatic))   Pulse 79   SpO2 99%     Exam:  GEN:  Well developed, well nourished.  No acute distress.  Normal pain behavior.  HEENT:  No trauma.  Mucous membranes moist.  Nares patent bilaterally.  PSYCH: Normal affect. Thought content appropriate.  CHEST:  Breathing symmetric.  No audible wheezing.  ABD: Soft, non-distended.  SKIN:  Warm, pink, dry.  No rash on exposed areas.    EXT:  No cyanosis, clubbing, or edema.  No color change or changes in nail or hair growth.  NEURO/MUSCULOSKELETAL:  Fully alert, oriented, and appropriate. Speech normal tasha. No cranial nerve deficits.   Gait:  Antalgic.  No trendelenburg sign bilaterally.   Motor Strength:  5/5 motor strength throughout lower extremities.   L-Spine:   Limited ROM with pain on extension more than flexion.  Positive pain with axial/facet loading bilaterally.  Negative SLR bilaterally.    Positive TTP over bilateral lower lumbar paraspinals          Imaging:  Narrative & Impression  EXAMINATION:  MRI LUMBAR SPINE WITHOUT CONTRAST     CLINICAL HISTORY:  Lumbar radiculopathy, symptoms persist with conservative treatment; Other intervertebral disc degeneration, lumbar region     TECHNIQUE:  Multiplanar, multisequence MR images were acquired from the thoracolumbar junction to the sacrum without the administration of contrast.     COMPARISON:  08/13/2018     FINDINGS:  The vertebral bodies demonstrate no evidence of fracture, osseous destructive process or aggressive bone marrow replacement process.     Normal sagittal alignment is preserved.  No spondylolisthesis.     Spinal canal appears narrowed on a developmental basis.     Individual disc levels are further discussed below:     T11-12: Loss of disc height with degenerative endplate changes.  No large disc herniation or spinal stenosis.     T12-L1, L1-2, L2-3: No significant degenerative disc disease.  Mild facet arthropathy.  No significant spinal stenosis or neural foraminal narrowing.     L3-4: Modest endplate marginal osteophyte formation.  The left foraminal disc protrusion.  Facet hypertrophy with thickening ligamentum flavum.  Modest narrowing of the spinal canal diameter with bilateral lateral recess stenosis.  Mild left-sided neural foraminal encroachment.     L4-5: Prior left-sided hemilaminotomy.  Advanced loss of disc height with some Modic type endplate changes and circumferential endplate marginal osteophyte formation.  Facet hypertrophy.  Bilateral lateral recess stenosis and neural foraminal encroachment.     L5-S1: Circumferential endplate marginal osteophyte formation and mild disc bulging.  Bilateral facet arthropathy, right more than left.  No significant spinal stenosis or neural foraminal  narrowing.     The terminal spinal cord, conus, and cauda equina demonstrate normal caliber, morphology, and signal.     No intraspinal mass or fluid collection.     No acute abnormalities in the visualized paraspinal soft tissue structures.     Impression:     Prior L4-5 hemilaminotomy for presumed micro discectomy.     Spinal canal appears mildly narrowed on a developmental basis.     Mild superimposed degenerative change as above, most pronounced at L3-4 and L4-5.        Electronically signed by:Brandyn Reyes MD  Date:                                            05/18/2024  Time:                                           15:36    Narrative     EXAMINATION:  MRI LUMBAR SPINE WITHOUT CONTRAST    CLINICAL HISTORY:  radicular leg pain; Arthrodesis status    TECHNIQUE:  Multiplanar, multisequence MR images were acquired from the thoracolumbar junction to the sacrum without the administration of contrast.    COMPARISON:  None.    FINDINGS:  Small portions of this exam are limited by patient motion.    The vertebral bodies maintain normal height, signal intensity and alignment.  There is multilevel disc desiccation with loss of disc height identified at L4-L5 and L5-S1.  This is most advanced at L4-L5.  The conus terminates at level L1.  Evaluation of the localizer images and structures surrounding the lumbar spine reveal no abnormalities.  There is mild central canal stenosis throughout likely related to congenitally shortened pedicles.    L1-L2: No significant disc or joint pathology.    L2-L3: Mild diffuse disc bulge present.  No facet arthropathy or ligamentum flavum hypertrophy.  The central canal and neural foramen are patent.    L3-L4: Mild diffuse disc bulge with very mild bilateral facet arthropathy and some ligamentum flavum hypertrophy.  There is mild left neural foraminal stenosis.  The right neural foramen is patent.    L4-L5: There is a large diffuse disc bulge with moderate to severe bilateral facet  arthropathy and no significant ligamentum flavum hypertrophy.  No significant central canal stenosis however, there is moderate bilateral neural foraminal stenosis.    L5-S1: Mild diffuse disc bulge.  Moderate to severe right and mild-to-moderate left facet arthropathy present.  No significant ligamentum flavum hypertrophy.  The neural foramen are patent.   Impression       Multilevel degenerative disc and joint disease is present.  Congenitally shortened pedicles likely contributes to the mild central canal stenosis throughout the lumbar spine.  No severe central canal or neural foraminal stenosis at any level.      Electronically signed by: Linda Sadler MD  Date: 08/13/2018  Time: 13:54         Narrative     History: Cervicalgia.    Procedure: Sagittal T1, T2, STIR sequences and axial T2-weighted sequence and a gradient echo sequence is performed.    Comparison study none    Findings:    There is normal cervical lordosis. No marrow replacing lesions or fractures noted. Craniovertebral alignment is within normal limits. There is no Chiari type malformations. The signal intensity within the cervical cord is within normal limits.    C2-3: No disc herniations or spinal stenosis.    C3-4: There is disc dehydration. No significant disc herniations or spinal stenosis. There is at least moderate right foraminal stenosis and mild left foraminal stenosis. No cord compression.    C4-5: Approximately 2 mm posterior subluxation of C4 on C5 associated with disc dehydration and a posterior disc bulge and osteophyte complex. Effacement of the anterior subarachnoid space with slight dorsal displacement and compression of the cord is noted (image 10 series 7 and 6). There is severe bilateral foraminal stenosis. At least mild central canal spinal stenosis.    C5-6: Severe disc degeneration with disc space narrowing and global anterior and posterior osteophyte and disc bulge complex. There is posterior displacement of the  cervical cord with slight flattening of the cervical cord. There is central canal spinal stenosis. There is severe right foraminal stenosis and moderate left foraminal stenosis.    C6-7: Mild broad-based left paracentral disc herniation and osteophyte complex without cord compression or spinal stenosis. There is a moderate to severe left foraminal stenosis and moderate right foraminal stenosis.    C7-T1: No significant central canal spinal stenosis or disc herniations or foraminal stenosis.   Impression         1. Multilevel degenerative disc disease as above. There is spinal stenosis and foraminal stenotic changes more pronounced at C4-5 and C5-6 disc spaces. See details at each disc space level above.      Electronically signed by: DELL TELLEZ MD  Date: 12/12/17  Time: 14:27          Assessment:       Encounter Diagnoses   Name Primary?    DDD (degenerative disc disease), lumbar Yes    Lumbar spondylosis     Lumbar radiculopathy     Diabetic peripheral neuropathy associated with type 2 diabetes mellitus        Plan:       Cortes was seen today for follow-up.    Diagnoses and all orders for this visit:    DDD (degenerative disc disease), lumbar    Lumbar spondylosis    Lumbar radiculopathy    Diabetic peripheral neuropathy associated with type 2 diabetes mellitus        Cortes Schroeder is a 64 y.o. male with chronic bilateral lower back and left lower extremity pain.  Subjectively neuropathic in nature.  Suspect pain is multifactorial.  Initially was having pain consistent with a left lower lumbar radiculopathy, this has somewhat improved with PT.  Also with pain across his lower lumbar region which suspect is axial likely related to lower lumbar facet degeneration.  Lumbar MRI with multilevel degenerative changes, most notable at L4-5 with moderate to severe bilateral foraminal stenosis at this level.  Also notable facet arthropathy at L5-S1 and to a lesser degree at L4-5.  Notably worse on the right at L5-S1.   Patient's left foot/toe pain unclear if radicular.  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    Prior records reviewed.  Pertinent imaging studies reviewed by me. Imaging results were discussed with patient.  Schedule for bilateral L5 transforaminal epidural steroid injection.  Discussed with patient that his pain appears to be multifactorial.  He may also benefit from bilateral L3, L4, L5 MBB/RFA in the future.  Continue gabapentin.  Patient currently taking 100 mg in the morning and typically takes 300 mg q.h.s. although often requires upward of 1200 mg at night in order to get relief to help him sleep.  Continue tizanidine 4 mg q.h.s. p.r.n..  Stressed the importance of maintaining regular home exercise program and being mindful of how they use their back throughout the day.  Patient expressed understanding and agreement.  Return to clinic after procedure to discuss efficacy.      Alf Mercado PA-C  [facility]  Interventional Pain Management  05/22/2024    This note was created by combination of typed  and M-Modal dictation.  Transcription and phonetic errors may be present.  If there are any questions, please contact me.

## 2024-05-22 NOTE — PROGRESS NOTES
Subjective:     Patient ID: Cortes Schroeder is a 64 y.o. male    Chief Complaint: Follow-up      Referred by: No ref. provider found      HPI:    Interval History PA (05/22/2024):  Patient returns to clinic for follow up of bilateral lower back and extremity pain and MRI review.  Denies significant changes in the quality or location of pain since previous visit.  Denies new or worsening symptoms.  Continues to note pain primarily across his lower lumbar paraspinal region, worse on the right.  Previously with significant left lower extremity pain however this has been improved with physical therapy.  He does continue to report intermittent left lower extremity symptoms/pain but overall has been more tolerable.  Also with intermittent paresthesia over his left anterior shin into his left foot.  He does continue to express pain over his toes bilaterally, worse on the left.  Notes difficulty ADLs, pain increases with prolonged standing or walking.  Denies weakness, bowel or bladder dysfunction.    Interval History PA (05/01/2024):  Patient returns to clinic for follow up of bilateral lower back and extremity pain.  Since previous visit he has been attending physical therapy with some benefit although denies significant improvement in his pain levels.  He does note improvement specifically in his left lower extremity pain.  Pain throughout his left lower extremity is overall tolerable although he does continue to note paresthesia over his distal left anterior shin as well as significant pain in his left big toe.  Also with continued bilateral lower back pain, right worse than left.  Pain is located across his lower lumbar paraspinal region.  Denies significant pain into his lower extremities at this time although has had radicular symptoms present in the past.  He does note difficulty sleeping, frequently waking up due to increased pain.  Denies any weakness, bowel or bladder dysfunction.    Interval History  (3/1/24):  He returns today for follow up.  He has been having severe left lower extremity pain.  Pain extends in the left leg and into his 2nd and 3rd toe.  Pain is sharp and burning.  Associated tingling.  Pain is constant.  Significantly disrupts sleep.  Does have some mild associated left-sided low back pain, but actually has more right-sided low back pain at this time.  Denies any right lower extremity symptoms.     Interval History (1/29/19):  He returns today for follow up.  He reports that C7-T1 interlaminar epidural steroid injection has been helpful for the neck and upper extremity pain. He reports about 85% relief of the symptoms.  He is happy with this relief and does not feel as though any further interventions are needed for his neck.  However he does wish to discuss his chronic low back pain today.  He reports having chronic low back pain and is status post lumbar surgery.  The pain is located across the lower lumbar region more prominent on the right side.  The pain will radiate down the posterior right thigh and leg with associated numbness and tingling.  He reports intermittent weakness during activity but denies any focal weakness or bowel or bladder dysfunction.      Initial Encounter (1/8/19):  Cortes Schroeder is a 64 y.o. male who presents today with chronic neck pain R>L and bilateral upper extremity pain, numbness and weakness. This has been present for at least one year. The pain is located in the cervical region and radiates to the right arm, forearm and hand. There is associated numbness in this area. He reports that his right upper extremity is also weak. He denies any weakness in the left upper extremity. He denies b/b dysfunction. The pain is constant and worsened with activity.    This pain is described in detail below.    Physical Therapy: Yes.      Non-pharmacologic Treatment: rest helps         TENS?: no    Pain Medications:         Currently taking: Gabapentin, Cymbalta,  nortriptyline    Has tried in the past:      Has not tried: Opioids, NSAIDs, Tylenol, Muscle relaxants, topical creams    Blood thinners: ASA 81mg daily    Interventional Therapies:   1/16/19 - C7-T1 interlaminar epidural steroid injection - 85% relief noted    Relevant Surgeries:   Previous low back surgery no other details specified at this time    Affecting sleep? Yes    Affecting daily activities? yes    Depressive symptoms? yes          SI/HI? No    Work status: Employed    Pain Scores:    Best:       4/10  Worst:     8/10  Usually:   6/10  Today:    3/10    Pain Disability Index  Family/Home Responsibilities:: 8  Recreation:: 8  Social Activity:: 8  Occupation:: 8  Sexual Behavior:: 8  Self Care:: 8  Life-Support Activities:: 6  Pain Disability Index (PDI): 54      Review of Systems   Constitutional:  Negative for activity change, appetite change, chills, fatigue, fever and unexpected weight change.   HENT:  Negative for hearing loss.    Eyes:  Negative for visual disturbance.   Respiratory:  Negative for chest tightness and shortness of breath.    Cardiovascular:  Negative for chest pain.   Gastrointestinal:  Negative for abdominal pain, constipation, diarrhea, nausea and vomiting.   Genitourinary:  Negative for difficulty urinating.   Musculoskeletal:  Positive for arthralgias, back pain and gait problem. Negative for myalgias, neck pain and neck stiffness.   Skin:  Negative for rash.   Neurological:  Positive for numbness. Negative for dizziness, weakness, light-headedness and headaches.   Psychiatric/Behavioral:  Positive for sleep disturbance. Negative for hallucinations and suicidal ideas. The patient is not nervous/anxious.        Past Medical History:   Diagnosis Date    Anxiety 12/18/2012    Back pain 12/18/2012    Cataract     Chronic pain syndrome 4/24/2016    Diabetes type 2, controlled 2/20/2016    Diabetic retinopathy     DM (diabetes mellitus) 12/18/2012    DM (diabetes mellitus), type 2,  uncontrolled 11/16/2013    Essential hypertension 2/20/2016    Gastroesophageal reflux disease without esophagitis 2/20/2016    Hyperlipidemia 12/18/2012    Insomnia 8/7/2014    Neuropathy 11/16/2013       Past Surgical History:   Procedure Laterality Date    BACK SURGERY  2003    Lumbar Spine    CATARACT EXTRACTION      EPIDURAL STEROID INJECTION N/A 1/16/2019    Procedure: Injection, Steroid, Epidural Cervical;  Surgeon: Andrew Sinclair Jr., MD;  Location: [facility];  Service: Pain Management;  Laterality: N/A;  Cervical Epidural Steroid Injection C7-T1    03985    Arrive @ 1240    EPIDURAL STEROID INJECTION N/A 2/20/2019    Procedure: Injection, Steroid, Epidural;  Surgeon: Andrew Sinclair Jr., MD;  Location: [facility];  Service: Pain Management;  Laterality: N/A;  Lumbar Epidural Steroid Injection L4-5    88143    Arrive @ 1215 (requests latest time)       Social History     Socioeconomic History    Marital status:    Tobacco Use    Smoking status: Never    Smokeless tobacco: Never   Substance and Sexual Activity    Alcohol use: Yes     Alcohol/week: 1.0 standard drink of alcohol     Types: 1 Glasses of wine per week     Comment: occasionally    Drug use: No    Sexual activity: Not Currently     Partners: Male     Birth control/protection: Condom     Comment: 10/2/17      Social Determinants of Health     Financial Resource Strain: Patient Declined (2/23/2024)    Overall Financial Resource Strain (CARDIA)     Difficulty of Paying Living Expenses: Patient declined   Food Insecurity: Patient Declined (2/23/2024)    Hunger Vital Sign     Worried About Running Out of Food in the Last Year: Patient declined     Ran Out of Food in the Last Year: Patient declined   Transportation Needs: No Transportation Needs (2/23/2024)    PRAPARE - Transportation     Lack of Transportation (Medical): No     Lack of Transportation (Non-Medical): No   Physical Activity: Insufficiently Active (2/23/2024)    Exercise  Vital Sign     Days of Exercise per Week: 3 days     Minutes of Exercise per Session: 10 min   Stress: Stress Concern Present (2/23/2024)    [employer] - Occupational Stress Questionnaire     Feeling of Stress : To some extent   Housing Stability: Low Risk  (2/23/2024)    Housing Stability Vital Sign     Unable to Pay for Housing in the Last Year: No     Number of Places Lived in the Last Year: 1     Unstable Housing in the Last Year: No       Review of patient's allergies indicates:   Allergen Reactions    Invokana [canagliflozin] Anaphylaxis    Percocet [oxycodone-acetaminophen] Nausea Only and Hallucinations    Biaxin [clarithromycin]     Hydrocodone Other (See Comments)     Dizzy/nausea/hallucinations    Sulfa (sulfonamide antibiotics) Nausea Only and Rash       Current Outpatient Medications on File Prior to Visit   Medication Sig Dispense Refill    cinnamon bark (CINNAMON) 500 mg capsule Take 500 mg by mouth 2 (two) times a day.      DULoxetine (CYMBALTA) 60 MG capsule Take 1 capsule (60 mg total) by mouth once daily. 90 capsule 0    emtricitabine-tenofovir 200-300 mg (TRUVADA) 200-300 mg Tab Take 1 tablet by mouth once daily. 30 tablet 0    fish oil-omega-3 fatty acids 300-1,000 mg capsule Take by mouth 2 (two) times daily.      gabapentin (NEURONTIN) 100 MG capsule Take 1 capsule (100 mg total) by mouth once daily. 90 capsule 3    gabapentin (NEURONTIN) 600 MG tablet Take 2 tablets (1,200 mg total) by mouth every evening. 180 tablet 3    HUMALOG U-100 INSULIN 100 unit/mL injection Use in insulin pump; max dose 150 units per day. 10 mL 11    insulin pump cartridge (OMNIPOD DASH 5 PACK POD) Crtg Apply new pod to skin every 1.5 days 60 each 3    insulin pump controller (OMNIPOD DASH PDM KIT MISC) by Misc.(Non-Drug; Combo Route) route.      L-CITRULLINE MISC by Misc.(Non-Drug; Combo Route) route Daily.      lancets Misc To check BG 4-7 times daily, to use with insurance preferred  meter 100 each 6    losartan (COZAAR) 50 MG tablet TAKE 1 TABLET(50 MG) BY MOUTH EVERY DAY 90 tablet 3    magnesium oxide-Mg AA chelate (MG-PLUS-PROTEIN) 133 mg Tab Take 500 mg by mouth every evening.       nortriptyline (PAMELOR) 50 MG capsule Take 1 capsule (50 mg total) by mouth nightly. 90 capsule 0    OPW TEST CLAIM - DO NOT FILL Inject into the skin. OPW test claim. Do not fill. 50 mL 0    rosuvastatin (CRESTOR) 5 MG tablet TAKE 1 TABLET(5 MG) BY MOUTH EVERY DAY 90 tablet 3    tiZANidine (ZANAFLEX) 4 MG tablet Take 2 tablets (8 mg total) by mouth every 6 (six) hours as needed (as needed for muscle spasms). 60 tablet 0    vitamin D 1000 units Tab Take 185 mg by mouth 2 (two) times daily.       blood-glucose sensor (DEXCOM G6 SENSOR) [place] Change sensor every 10 days 3 each PRN    blood-glucose transmitter (DEXCOM G6 TRANSMITTER) [place] Change transmitter every 3 months 1 each PRN    [DISCONTINUED] insulin aspart U-100 (NOVOLOG U-100 INSULIN ASPART) 100 unit/mL injection Use in insulin pump. Max daily dose 150 units. 50 mL 5     No current facility-administered medications on file prior to visit.       Objective:      BP (!) 183/74 (BP Location: Left arm, Patient Position: Sitting, BP Method: Medium (Automatic))   Pulse 79   SpO2 99%     Exam:  GEN:  Well developed, well nourished.  No acute distress.  Normal pain behavior.  HEENT:  No trauma.  Mucous membranes moist.  Nares patent bilaterally.  PSYCH: Normal affect. Thought content appropriate.  CHEST:  Breathing symmetric.  No audible wheezing.  ABD: Soft, non-distended.  SKIN:  Warm, pink, dry.  No rash on exposed areas.    EXT:  No cyanosis, clubbing, or edema.  No color change or changes in nail or hair growth.  NEURO/MUSCULOSKELETAL:  Fully alert, oriented, and appropriate. Speech normal tasha. No cranial nerve deficits.   Gait:  Antalgic.  No trendelenburg sign bilaterally.   Motor Strength:  5/5 motor strength throughout lower extremities.   L-Spine:   Limited ROM with pain on extension more than flexion.  Positive pain with axial/facet loading bilaterally.  Negative SLR bilaterally.    Positive TTP over bilateral lower lumbar paraspinals          Imaging:  Narrative & Impression  EXAMINATION:  MRI LUMBAR SPINE WITHOUT CONTRAST     CLINICAL HISTORY:  Lumbar radiculopathy, symptoms persist with conservative treatment; Other intervertebral disc degeneration, lumbar region     TECHNIQUE:  Multiplanar, multisequence MR images were acquired from the thoracolumbar junction to the sacrum without the administration of contrast.     COMPARISON:  08/13/2018     FINDINGS:  The vertebral bodies demonstrate no evidence of fracture, osseous destructive process or aggressive bone marrow replacement process.     Normal sagittal alignment is preserved.  No spondylolisthesis.     Spinal canal appears narrowed on a developmental basis.     Individual disc levels are further discussed below:     T11-12: Loss of disc height with degenerative endplate changes.  No large disc herniation or spinal stenosis.     T12-L1, L1-2, L2-3: No significant degenerative disc disease.  Mild facet arthropathy.  No significant spinal stenosis or neural foraminal narrowing.     L3-4: Modest endplate marginal osteophyte formation.  The left foraminal disc protrusion.  Facet hypertrophy with thickening ligamentum flavum.  Modest narrowing of the spinal canal diameter with bilateral lateral recess stenosis.  Mild left-sided neural foraminal encroachment.     L4-5: Prior left-sided hemilaminotomy.  Advanced loss of disc height with some Modic type endplate changes and circumferential endplate marginal osteophyte formation.  Facet hypertrophy.  Bilateral lateral recess stenosis and neural foraminal encroachment.     L5-S1: Circumferential endplate marginal osteophyte formation and mild disc bulging.  Bilateral facet arthropathy, right more than left.  No significant spinal stenosis or neural foraminal  narrowing.     The terminal spinal cord, conus, and cauda equina demonstrate normal caliber, morphology, and signal.     No intraspinal mass or fluid collection.     No acute abnormalities in the visualized paraspinal soft tissue structures.     Impression:     Prior L4-5 hemilaminotomy for presumed micro discectomy.     Spinal canal appears mildly narrowed on a developmental basis.     Mild superimposed degenerative change as above, most pronounced at L3-4 and L4-5.        Electronically signed by:Brandyn Reyes MD  Date:                                            05/18/2024  Time:                                           15:36    Narrative     EXAMINATION:  MRI LUMBAR SPINE WITHOUT CONTRAST    CLINICAL HISTORY:  radicular leg pain; Arthrodesis status    TECHNIQUE:  Multiplanar, multisequence MR images were acquired from the thoracolumbar junction to the sacrum without the administration of contrast.    COMPARISON:  None.    FINDINGS:  Small portions of this exam are limited by patient motion.    The vertebral bodies maintain normal height, signal intensity and alignment.  There is multilevel disc desiccation with loss of disc height identified at L4-L5 and L5-S1.  This is most advanced at L4-L5.  The conus terminates at level L1.  Evaluation of the localizer images and structures surrounding the lumbar spine reveal no abnormalities.  There is mild central canal stenosis throughout likely related to congenitally shortened pedicles.    L1-L2: No significant disc or joint pathology.    L2-L3: Mild diffuse disc bulge present.  No facet arthropathy or ligamentum flavum hypertrophy.  The central canal and neural foramen are patent.    L3-L4: Mild diffuse disc bulge with very mild bilateral facet arthropathy and some ligamentum flavum hypertrophy.  There is mild left neural foraminal stenosis.  The right neural foramen is patent.    L4-L5: There is a large diffuse disc bulge with moderate to severe bilateral facet  arthropathy and no significant ligamentum flavum hypertrophy.  No significant central canal stenosis however, there is moderate bilateral neural foraminal stenosis.    L5-S1: Mild diffuse disc bulge.  Moderate to severe right and mild-to-moderate left facet arthropathy present.  No significant ligamentum flavum hypertrophy.  The neural foramen are patent.   Impression       Multilevel degenerative disc and joint disease is present.  Congenitally shortened pedicles likely contributes to the mild central canal stenosis throughout the lumbar spine.  No severe central canal or neural foraminal stenosis at any level.      Electronically signed by: Linda Sadler MD  Date: 08/13/2018  Time: 13:54         Narrative     History: Cervicalgia.    Procedure: Sagittal T1, T2, STIR sequences and axial T2-weighted sequence and a gradient echo sequence is performed.    Comparison study none    Findings:    There is normal cervical lordosis. No marrow replacing lesions or fractures noted. Craniovertebral alignment is within normal limits. There is no Chiari type malformations. The signal intensity within the cervical cord is within normal limits.    C2-3: No disc herniations or spinal stenosis.    C3-4: There is disc dehydration. No significant disc herniations or spinal stenosis. There is at least moderate right foraminal stenosis and mild left foraminal stenosis. No cord compression.    C4-5: Approximately 2 mm posterior subluxation of C4 on C5 associated with disc dehydration and a posterior disc bulge and osteophyte complex. Effacement of the anterior subarachnoid space with slight dorsal displacement and compression of the cord is noted (image 10 series 7 and 6). There is severe bilateral foraminal stenosis. At least mild central canal spinal stenosis.    C5-6: Severe disc degeneration with disc space narrowing and global anterior and posterior osteophyte and disc bulge complex. There is posterior displacement of the  cervical cord with slight flattening of the cervical cord. There is central canal spinal stenosis. There is severe right foraminal stenosis and moderate left foraminal stenosis.    C6-7: Mild broad-based left paracentral disc herniation and osteophyte complex without cord compression or spinal stenosis. There is a moderate to severe left foraminal stenosis and moderate right foraminal stenosis.    C7-T1: No significant central canal spinal stenosis or disc herniations or foraminal stenosis.   Impression         1. Multilevel degenerative disc disease as above. There is spinal stenosis and foraminal stenotic changes more pronounced at C4-5 and C5-6 disc spaces. See details at each disc space level above.      Electronically signed by: DELL TELLEZ MD  Date: 12/12/17  Time: 14:27          Assessment:       Encounter Diagnoses   Name Primary?    DDD (degenerative disc disease), lumbar Yes    Lumbar spondylosis     Lumbar radiculopathy     Diabetic peripheral neuropathy associated with type 2 diabetes mellitus        Plan:       Cortes was seen today for follow-up.    Diagnoses and all orders for this visit:    DDD (degenerative disc disease), lumbar    Lumbar spondylosis    Lumbar radiculopathy    Diabetic peripheral neuropathy associated with type 2 diabetes mellitus        Cortes Schroeder is a 64 y.o. male with chronic bilateral lower back and left lower extremity pain.  Subjectively neuropathic in nature.  Suspect pain is multifactorial.  Initially was having pain consistent with a left lower lumbar radiculopathy, this has somewhat improved with PT.  Also with pain across his lower lumbar region which suspect is axial likely related to lower lumbar facet degeneration.  Lumbar MRI with multilevel degenerative changes, most notable at L4-5 with moderate to severe bilateral foraminal stenosis at this level.  Also notable facet arthropathy at L5-S1 and to a lesser degree at L4-5.  Notably worse on the right at L5-S1.   Patient's left foot/toe pain unclear if radicular.  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    Prior records reviewed.  Pertinent imaging studies reviewed by me. Imaging results were discussed with patient.  Schedule for bilateral L5 transforaminal epidural steroid injection.  Discussed with patient that his pain appears to be multifactorial.  He may also benefit from bilateral L3, L4, L5 MBB/RFA in the future.  Continue gabapentin.  Patient currently taking 100 mg in the morning and typically takes 300 mg q.h.s. although often requires upward of 1200 mg at night in order to get relief to help him sleep.  Continue tizanidine 4 mg q.h.s. p.r.n..  Stressed the importance of maintaining regular home exercise program and being mindful of how they use their back throughout the day.  Patient expressed understanding and agreement.  Return to clinic after procedure to discuss efficacy.      Alf Mercado PA-C  [facility]  Interventional Pain Management  05/22/2024    This note was created by combination of typed  and M-Modal dictation.  Transcription and phonetic errors may be present.  If there are any questions, please contact me.

## 2024-05-27 ENCOUNTER — TELEPHONE (OUTPATIENT)
Dept: PAIN MEDICINE | Facility: CLINIC | Age: 64
End: 2024-05-27
Payer: COMMERCIAL

## 2024-05-27 DIAGNOSIS — M54.16 LUMBAR RADICULOPATHY: ICD-10-CM

## 2024-05-27 DIAGNOSIS — M51.36 DDD (DEGENERATIVE DISC DISEASE), LUMBAR: Primary | ICD-10-CM

## 2024-05-27 NOTE — TELEPHONE ENCOUNTER
Attempted to contact Mr. Schroeder to discuss scheduling options for bilat L5 TF YVONNE- no anser and VM full. Msg sent via [facility] asking pt to contact clinic- phone number included.

## 2024-05-28 ENCOUNTER — TELEPHONE (OUTPATIENT)
Dept: PAIN MEDICINE | Facility: CLINIC | Age: 64
End: 2024-05-28
Payer: COMMERCIAL

## 2024-05-28 NOTE — TELEPHONE ENCOUNTER
Attempted to contact pt to discuss scheduling options for bilat L5 TF YVONNE ordered by YARELIS Mercado- no answer and MADDY full, unable to LVM.

## 2024-05-30 ENCOUNTER — TELEPHONE (OUTPATIENT)
Dept: PAIN MEDICINE | Facility: CLINIC | Age: 64
End: 2024-05-30
Payer: COMMERCIAL

## 2024-05-30 NOTE — TELEPHONE ENCOUNTER
Mr. Schroeder would like to schedule the bilat L5 TF YVONNE on 6/14/24- checking in at 1:00pm. Provider updated.     Clearance to hold ASA x5 days prior will be faxed to PCP:  Dr. Cotton with [facility]  Fax #[phone]

## 2024-05-30 NOTE — TELEPHONE ENCOUNTER
----- Message from Lobito Timo sent at 5/30/2024  9:24 AM CDT -----  Regarding: self  Type: Patient Call Back    Who called:self    What is the request in detail:pt is returning a call to Amber Cook RN     Can the clinic reply by [facility]?no    Would the patient rather a call back or a response via [facility]? callback    Best call back number:[phone]    Additional Information:

## 2024-06-06 ENCOUNTER — TELEPHONE (OUTPATIENT)
Dept: PAIN MEDICINE | Facility: CLINIC | Age: 64
End: 2024-06-06
Payer: COMMERCIAL

## 2024-06-07 ENCOUNTER — TELEPHONE (OUTPATIENT)
Dept: PAIN MEDICINE | Facility: CLINIC | Age: 64
End: 2024-06-07
Payer: COMMERCIAL

## 2024-06-07 NOTE — TELEPHONE ENCOUNTER
Reviewed pre-procedure instructions with Mark Alexnadre, as well as provided arrival time of 1:00p for 6/14/24.  All questions answered- verbalized understanding.    Instructed him to begin holding ASA starting Sunday 6/9/24- verbalized understanding.

## 2024-06-07 NOTE — TELEPHONE ENCOUNTER
Attempted to contact pt to review procedure instructions for 6/14/24- no answer and VM full, unable to LVM. Information sent via [facility].    Included that he is to begin holding ASA starting Sunday- 6/9/24. See media for clearance.

## 2024-06-13 NOTE — DISCHARGE INSTRUCTIONS

## 2024-06-14 ENCOUNTER — HOSPITAL ENCOUNTER (OUTPATIENT)
Facility: HOSPITAL | Age: 64
Discharge: HOME | End: 2024-06-14
Attending: PAIN MEDICINE | Admitting: PAIN MEDICINE
Payer: COMMERCIAL

## 2024-06-14 ENCOUNTER — PATIENT MESSAGE (OUTPATIENT)
Dept: PAIN MEDICINE | Facility: CLINIC | Age: 64
End: 2024-06-14

## 2024-06-14 VITALS
SYSTOLIC BLOOD PRESSURE: 160 MMHG | HEART RATE: 80 BPM | DIASTOLIC BLOOD PRESSURE: 78 MMHG | RESPIRATION RATE: 17 BRPM | TEMPERATURE: 98 F | OXYGEN SATURATION: 95 %

## 2024-06-14 DIAGNOSIS — M54.16 LUMBAR RADICULOPATHY: Primary | ICD-10-CM

## 2024-06-14 PROCEDURE — 25500020 PHARM REV CODE 255: Performed by: PAIN MEDICINE

## 2024-06-14 PROCEDURE — 25000003 PHARM REV CODE 250: Performed by: PAIN MEDICINE

## 2024-06-14 PROCEDURE — 64483 NJX AA&/STRD TFRM EPI L/S 1: CPT | Mod: 50 | Performed by: PAIN MEDICINE

## 2024-06-14 PROCEDURE — 64483 NJX AA&/STRD TFRM EPI L/S 1: CPT | Mod: 50,,, | Performed by: PAIN MEDICINE

## 2024-06-14 PROCEDURE — 63600175 PHARM REV CODE 636 W HCPCS: Performed by: PAIN MEDICINE

## 2024-06-14 RX ORDER — DEXAMETHASONE SODIUM PHOSPHATE 10 MG/ML
INJECTION INTRAMUSCULAR; INTRAVENOUS
Status: DISCONTINUED
Start: 2024-06-14 | End: 2024-06-14 | Stop reason: HOSPADM

## 2024-06-14 RX ORDER — LIDOCAINE HYDROCHLORIDE 10 MG/ML
INJECTION, SOLUTION EPIDURAL; INFILTRATION; INTRACAUDAL; PERINEURAL
Status: DISCONTINUED
Start: 2024-06-14 | End: 2024-06-14 | Stop reason: HOSPADM

## 2024-06-14 RX ORDER — ALPRAZOLAM 0.5 MG/1
1 TABLET, ORALLY DISINTEGRATING ORAL ONCE AS NEEDED
Status: COMPLETED | OUTPATIENT
Start: 2024-06-14 | End: 2024-06-14

## 2024-06-14 RX ORDER — LIDOCAINE HYDROCHLORIDE 10 MG/ML
20 INJECTION INFILTRATION; PERINEURAL ONCE
Status: COMPLETED | OUTPATIENT
Start: 2024-06-14 | End: 2024-06-14

## 2024-06-14 RX ORDER — DEXAMETHASONE SODIUM PHOSPHATE 10 MG/ML
10 INJECTION INTRAMUSCULAR; INTRAVENOUS ONCE
Status: COMPLETED | OUTPATIENT
Start: 2024-06-14 | End: 2024-06-14

## 2024-06-14 RX ORDER — LIDOCAINE HYDROCHLORIDE 10 MG/ML
INJECTION INFILTRATION; PERINEURAL
Status: DISCONTINUED
Start: 2024-06-14 | End: 2024-06-14 | Stop reason: HOSPADM

## 2024-06-14 RX ORDER — LIDOCAINE HYDROCHLORIDE 10 MG/ML
1 INJECTION, SOLUTION EPIDURAL; INFILTRATION; INTRACAUDAL; PERINEURAL ONCE
Status: COMPLETED | OUTPATIENT
Start: 2024-06-14 | End: 2024-06-14

## 2024-06-14 RX ADMIN — ALPRAZOLAM 1 MG: 0.5 TABLET, ORALLY DISINTEGRATING ORAL at 12:06

## 2024-06-14 NOTE — OP NOTE
Lumbar transforaminal YVONNE    Time-out taken to identify patient and procedure side prior to starting the procedure.   I attest that I have reviewed the patient's home medications prior to the procedure and no contraindication have been identified. I  re-evaluated the patient after the patient was positioned for the procedure in the procedure room immediately before the procedural time-out. The vital signs are current and represent the current state of the patient which has not significantly changed since the preprocedure assessment.                              Date of Service: 06/14/2024    PCP: Andrew Sinclair Jr., MD    Referring Physician:                                   PROCEDURE: Bilateral L5 transforaminal epidural steroid injection under fluoroscopy    REASON FOR PROCEDURE: Lumbar Radiculopathy    PHYSICIAN: Andrew Sinclair MD    ASSISTANTS:None    MEDICATIONS INJECTED:  Preservative-free dexamethasone 10mg, Xylocaine 1% MPF 3-5ml. 3ml of the mixture per level.     LOCAL ANESTHETIC INJECTED:  Xylocaine 1% 3ml per site.    SEDATION MEDICATIONS: None    ESTIMATED BLOOD LOSS:  None.    COMPLICATIONS:  None.    TECHNIQUE:   Laying in a prone position, the patient was prepped and draped in the usual sterile fashion using ChloraPrep and fenestrated drape.  The area to be injected was determined under fluoroscopic guidance.  Local anesthetic was given by raising a wheal and going down to the hub of a 27-gauge 1.25 inch needle.  The 4.75 inch 25-gauge spinal needle was introduced towards the transverse process of each above named nerve root level.  The needle was walked medially then hinged into the neural foramen.  Omnipaque was injected to confirm appropriate placement and that there was no vascular runoff.  The medication was then injected after applying negative pressure. The patient tolerated the procedure well.    PAIN BEFORE THE PROCEDURE: 5/10.    PAIN AFTER THE PROCEDURE: 2/10.    The patient was  monitored after the procedure.  Patient was given post procedure and discharge instructions to follow at home.  We will see the patient back in two weeks or the patient may call to inform of status. The patient was discharged in a stable condition.

## 2024-06-14 NOTE — DISCHARGE SUMMARY
[facility] - [place] - Pain Management  Discharge Note  Short Stay    Procedure(s) (LRB):  Bilateral L5 Transforaminal Epidural Steroid Injections (Bilateral)      OUTCOME: Patient tolerated treatment/procedure well without complication and is now ready for discharge.    DISPOSITION: Home or Self Care    FINAL DIAGNOSIS:  <principal problem not specified>    FOLLOWUP: In clinic    DISCHARGE INSTRUCTIONS:  No discharge procedures on file.       Discharge Diagnosis:DDD (degenerative disc disease), lumbar [M51.36]  Lumbar radiculopathy [M54.16]  Condition on Discharge: Stable.  Diet on Discharge: Same as before.  Activity: as per instruction sheet.  Discharge to: Home with a responsible adult.  Follow up: as per Discharge instructions

## 2024-06-17 LAB — POCT GLUCOSE: 160 MG/DL (ref 70–110)

## 2024-07-01 ENCOUNTER — OFFICE VISIT (OUTPATIENT)
Dept: PAIN MEDICINE | Facility: CLINIC | Age: 64
End: 2024-07-01
Payer: COMMERCIAL

## 2024-07-01 VITALS — SYSTOLIC BLOOD PRESSURE: 114 MMHG | OXYGEN SATURATION: 93 % | DIASTOLIC BLOOD PRESSURE: 62 MMHG | HEART RATE: 80 BPM

## 2024-07-01 DIAGNOSIS — M47.816 LUMBAR SPONDYLOSIS: ICD-10-CM

## 2024-07-01 DIAGNOSIS — M51.36 DDD (DEGENERATIVE DISC DISEASE), LUMBAR: Primary | ICD-10-CM

## 2024-07-01 DIAGNOSIS — M54.16 LUMBAR RADICULOPATHY: ICD-10-CM

## 2024-07-01 DIAGNOSIS — E11.42 DIABETIC PERIPHERAL NEUROPATHY ASSOCIATED WITH TYPE 2 DIABETES MELLITUS: ICD-10-CM

## 2024-07-01 PROCEDURE — 1159F MED LIST DOCD IN RCRD: CPT | Mod: CPTII,S$GLB,,

## 2024-07-01 PROCEDURE — 3074F SYST BP LT 130 MM HG: CPT | Mod: CPTII,S$GLB,,

## 2024-07-01 PROCEDURE — 3046F HEMOGLOBIN A1C LEVEL >9.0%: CPT | Mod: CPTII,S$GLB,,

## 2024-07-01 PROCEDURE — 1160F RVW MEDS BY RX/DR IN RCRD: CPT | Mod: CPTII,S$GLB,,

## 2024-07-01 PROCEDURE — 99999 PR PBB SHADOW E&M-EST. PATIENT-LVL IV: CPT | Mod: PBBFAC,,,

## 2024-07-01 PROCEDURE — 99213 OFFICE O/P EST LOW 20 MIN: CPT | Mod: S$GLB,,,

## 2024-07-01 PROCEDURE — 4010F ACE/ARB THERAPY RXD/TAKEN: CPT | Mod: CPTII,S$GLB,,

## 2024-07-01 PROCEDURE — 3078F DIAST BP <80 MM HG: CPT | Mod: CPTII,S$GLB,,

## 2024-07-01 RX ORDER — TIZANIDINE 4 MG/1
8 TABLET ORAL NIGHTLY PRN
Qty: 60 TABLET | Refills: 2 | Status: SHIPPED | OUTPATIENT
Start: 2024-07-01 | End: 2024-09-29

## 2024-07-01 NOTE — PROGRESS NOTES
Subjective:     Patient ID: Cortes Schroeder is a 64 y.o. male    Chief Complaint: Follow-up      Referred by: No ref. provider found      HPI:    Interval History PA (07/01/2024):  Patient returns to clinic for follow up of bilateral lower back and extremity pain.  Patient is s/p bilateral L5 transforaminal epidural steroid injection done on 06/14/2024 with 90% relief.  Patient noting overall significant improvement back extremity pain.  He does note occasional intermittent pains with certain activities/movements overall feels his pain is at a manageable level.  Continues to note with gabapentin.  Also noting benefit tizanidine q.h.s. p.r.n..  Requesting refills.    Interval History PA (05/22/2024):  Patient returns to clinic for follow up of bilateral lower back and extremity pain and MRI review.  Denies significant changes in the quality or location of pain since previous visit.  Denies new or worsening symptoms.  Continues to note pain primarily across his lower lumbar paraspinal region, worse on the right.  Previously with significant left lower extremity pain however this has been improved with physical therapy.  He does continue to report intermittent left lower extremity symptoms/pain but overall has been more tolerable.  Also with intermittent paresthesia over his left anterior shin into his left foot.  He does continue to express pain over his toes bilaterally, worse on the left.  Notes difficulty ADLs, pain increases with prolonged standing or walking.  Denies weakness, bowel or bladder dysfunction.    Interval History PA (05/01/2024):  Patient returns to clinic for follow up of bilateral lower back and extremity pain.  Since previous visit he has been attending physical therapy with some benefit although denies significant improvement in his pain levels.  He does note improvement specifically in his left lower extremity pain.  Pain throughout his left lower extremity is overall tolerable although he does  continue to note paresthesia over his distal left anterior shin as well as significant pain in his left big toe.  Also with continued bilateral lower back pain, right worse than left.  Pain is located across his lower lumbar paraspinal region.  Denies significant pain into his lower extremities at this time although has had radicular symptoms present in the past.  He does note difficulty sleeping, frequently waking up due to increased pain.  Denies any weakness, bowel or bladder dysfunction.    Interval History (3/1/24):  He returns today for follow up.  He has been having severe left lower extremity pain.  Pain extends in the left leg and into his 2nd and 3rd toe.  Pain is sharp and burning.  Associated tingling.  Pain is constant.  Significantly disrupts sleep.  Does have some mild associated left-sided low back pain, but actually has more right-sided low back pain at this time.  Denies any right lower extremity symptoms.     Interval History (1/29/19):  He returns today for follow up.  He reports that C7-T1 interlaminar epidural steroid injection has been helpful for the neck and upper extremity pain. He reports about 85% relief of the symptoms.  He is happy with this relief and does not feel as though any further interventions are needed for his neck.  However he does wish to discuss his chronic low back pain today.  He reports having chronic low back pain and is status post lumbar surgery.  The pain is located across the lower lumbar region more prominent on the right side.  The pain will radiate down the posterior right thigh and leg with associated numbness and tingling.  He reports intermittent weakness during activity but denies any focal weakness or bowel or bladder dysfunction.      Initial Encounter (1/8/19):  Cortes Schroeder is a 64 y.o. male who presents today with chronic neck pain R>L and bilateral upper extremity pain, numbness and weakness. This has been present for at least one year. The pain is  located in the cervical region and radiates to the right arm, forearm and hand. There is associated numbness in this area. He reports that his right upper extremity is also weak. He denies any weakness in the left upper extremity. He denies b/b dysfunction. The pain is constant and worsened with activity.    This pain is described in detail below.    Physical Therapy: Yes.      Non-pharmacologic Treatment: rest helps         TENS?: no    Pain Medications:         Currently taking: Gabapentin, Cymbalta, nortriptyline, tizanidine    Has tried in the past:      Has not tried: Opioids, NSAIDs, Tylenol, topical creams    Blood thinners: ASA 81mg daily    Interventional Therapies:   1/16/19 - C7-T1 interlaminar epidural steroid injection - 85% relief noted  06/14/2024 - bilateral L5 transforaminal epidural steroid injection - 90% relief    Relevant Surgeries:   Previous low back surgery no other details specified at this time    Affecting sleep? Yes    Affecting daily activities? yes    Depressive symptoms? yes          SI/HI? No    Work status: Employed    Pain Scores:    Best:       1/10  Worst:     7/10  Usually:   2/10  Today:    2/10    Pain Disability Index  Family/Home Responsibilities:: 8  Recreation:: 7  Social Activity:: 7  Occupation:: 6  Sexual Behavior:: 8  Self Care:: 6  Life-Support Activities:: 6  Pain Disability Index (PDI): 48      Review of Systems   Constitutional:  Negative for activity change, appetite change, chills, fatigue, fever and unexpected weight change.   HENT:  Negative for hearing loss.    Eyes:  Negative for visual disturbance.   Respiratory:  Negative for chest tightness and shortness of breath.    Cardiovascular:  Negative for chest pain.   Gastrointestinal:  Negative for abdominal pain, constipation, diarrhea, nausea and vomiting.   Genitourinary:  Negative for difficulty urinating.   Musculoskeletal:  Positive for arthralgias, back pain and gait problem. Negative for myalgias, neck  pain and neck stiffness.   Skin:  Negative for rash.   Neurological:  Positive for numbness. Negative for dizziness, weakness, light-headedness and headaches.   Psychiatric/Behavioral:  Positive for sleep disturbance. Negative for hallucinations and suicidal ideas. The patient is not nervous/anxious.        Past Medical History:   Diagnosis Date    Anxiety 12/18/2012    Back pain 12/18/2012    Cataract     Chronic pain syndrome 4/24/2016    Diabetes type 2, controlled 2/20/2016    Diabetic retinopathy     DM (diabetes mellitus) 12/18/2012    DM (diabetes mellitus), type 2, uncontrolled 11/16/2013    Essential hypertension 2/20/2016    Gastroesophageal reflux disease without esophagitis 2/20/2016    Hyperlipidemia 12/18/2012    Insomnia 8/7/2014    Neuropathy 11/16/2013       Past Surgical History:   Procedure Laterality Date    BACK SURGERY  2003    Lumbar Spine    CATARACT EXTRACTION      EPIDURAL STEROID INJECTION N/A 1/16/2019    Procedure: Injection, Steroid, Epidural Cervical;  Surgeon: Andrew Sinclair Jr., MD;  Location: [facility] ENDO;  Service: Pain Management;  Laterality: N/A;  Cervical Epidural Steroid Injection C7-T1    17479    Arrive @ 1240    EPIDURAL STEROID INJECTION N/A 2/20/2019    Procedure: Injection, Steroid, Epidural;  Surgeon: Andrew Sinclair Jr., MD;  Location: [facility] ENDO;  Service: Pain Management;  Laterality: N/A;  Lumbar Epidural Steroid Injection L4-5    [id number]    Arrive @ 1215 (requests latest time)    INJECTION, SPINE, LUMBOSACRAL, TRANSFORAMINAL APPROACH Bilateral 6/14/2024    Procedure: Bilateral L5 Transforaminal Epidural Steroid Injections;  Surgeon: Andrew Sinclair Jr., MD;  Location: [facility] PAIN MANAGEMENT;  Service: Pain Management;  Laterality: Bilateral;  @1300(given)  ASA last 6/8  Check BG  MD Sign.       Social History     Socioeconomic History    Marital status:    Tobacco Use    Smoking status: Never    Smokeless tobacco: Never   Substance and Sexual Activity     Alcohol use: Yes     Alcohol/week: 1.0 standard drink of alcohol     Types: 1 Glasses of wine per week     Comment: occasionally    Drug use: No    Sexual activity: Not Currently     Partners: Male     Birth control/protection: Condom     Comment: 10/2/17      Social Determinants of Health     Financial Resource Strain: Patient Declined (2/23/2024)    Overall Financial Resource Strain (CARDIA)     Difficulty of Paying Living Expenses: Patient declined   Food Insecurity: Patient Declined (2/23/2024)    Hunger Vital Sign     Worried About Running Out of Food in the Last Year: Patient declined     Ran Out of Food in the Last Year: Patient declined   Transportation Needs: No Transportation Needs (2/23/2024)    PRAPARE - Transportation     Lack of Transportation (Medical): No     Lack of Transportation (Non-Medical): No   Physical Activity: Insufficiently Active (2/23/2024)    Exercise Vital Sign     Days of Exercise per Week: 3 days     Minutes of Exercise per Session: 10 min   Stress: Stress Concern Present (2/23/2024)    [employer] [employer] - Occupational Stress Questionnaire     Feeling of Stress : To some extent   Housing Stability: Low Risk  (2/23/2024)    Housing Stability Vital Sign     Unable to Pay for Housing in the Last Year: No     Number of Places Lived in the Last Year: 1     Unstable Housing in the Last Year: No       Review of patient's allergies indicates:   Allergen Reactions    Invokana [canagliflozin] Anaphylaxis    Percocet [oxycodone-acetaminophen] Nausea Only and Hallucinations    Biaxin [clarithromycin]     Hydrocodone Other (See Comments)     Dizzy/nausea/hallucinations    Sulfa (sulfonamide antibiotics) Nausea Only and Rash       Current Outpatient Medications on File Prior to Visit   Medication Sig Dispense Refill    cinnamon bark (CINNAMON) 500 mg capsule Take 500 mg by mouth 2 (two) times a day.      DULoxetine (CYMBALTA) 60 MG capsule Take 1 capsule (60 mg  total) by mouth once daily. 90 capsule 0    emtricitabine-tenofovir 200-300 mg (TRUVADA) 200-300 mg Tab Take 1 tablet by mouth once daily. 30 tablet 0    fish oil-omega-3 fatty acids 300-1,000 mg capsule Take by mouth 2 (two) times daily.      gabapentin (NEURONTIN) 100 MG capsule Take 1 capsule (100 mg total) by mouth once daily. 90 capsule 3    gabapentin (NEURONTIN) 600 MG tablet Take 2 tablets (1,200 mg total) by mouth every evening. 180 tablet 3    HUMALOG U-100 INSULIN 100 unit/mL injection Use in insulin pump; max dose 150 units per day. 10 mL 11    insulin pump cartridge (OMNIPOD DASH 5 PACK POD) Crtg Apply new pod to skin every 1.5 days 60 each 3    insulin pump controller (OMNIPOD DASH PDM KIT MISC) by Misc.(Non-Drug; Combo Route) route.      L-CITRULLINE MISC by Misc.(Non-Drug; Combo Route) route Daily.      lancets Misc To check BG 4-7 times daily, to use with insurance preferred meter 100 each 6    losartan (COZAAR) 50 MG tablet TAKE 1 TABLET(50 MG) BY MOUTH EVERY DAY 90 tablet 3    magnesium oxide-Mg AA chelate (MG-PLUS-PROTEIN) 133 mg Tab Take 500 mg by mouth every evening.       nortriptyline (PAMELOR) 50 MG capsule Take 1 capsule (50 mg total) by mouth nightly. 90 capsule 0    OPW TEST CLAIM - DO NOT FILL Inject into the skin. OPW test claim. Do not fill. 50 mL 0    rosuvastatin (CRESTOR) 5 MG tablet TAKE 1 TABLET(5 MG) BY MOUTH EVERY DAY 90 tablet 3    vitamin D 1000 units Tab Take 185 mg by mouth 2 (two) times daily.       blood-glucose sensor (DEXCOM G6 SENSOR) [place] Change sensor every 10 days 3 each PRN    blood-glucose transmitter (DEXCOM G6 TRANSMITTER) [place] Change transmitter every 3 months 1 each PRN    [DISCONTINUED] insulin aspart U-100 (NOVOLOG U-100 INSULIN ASPART) 100 unit/mL injection Use in insulin pump. Max daily dose 150 units. 50 mL 5     No current facility-administered medications on file prior to visit.       Objective:      /62 (BP Location: Left arm, Patient Position:  Sitting, BP Method: Medium (Manual))   Pulse 80   SpO2 (!) 93%     Exam:  GEN:  Well developed, well nourished.  No acute distress.  Normal pain behavior.  HEENT:  No trauma.  Mucous membranes moist.  Nares patent bilaterally.  PSYCH: Normal affect. Thought content appropriate.  CHEST:  Breathing symmetric.  No audible wheezing.  ABD: Soft, non-distended.  SKIN:  Warm, pink, dry.  No rash on exposed areas.    EXT:  No cyanosis, clubbing, or edema.  No color change or changes in nail or hair growth.  NEURO/MUSCULOSKELETAL:  Fully alert, oriented, and appropriate. Speech normal tasha. No cranial nerve deficits.   Gait:  Antalgic.  No trendelenburg sign bilaterally.         Imaging:  Narrative & Impression  EXAMINATION:  MRI LUMBAR SPINE WITHOUT CONTRAST     CLINICAL HISTORY:  Lumbar radiculopathy, symptoms persist with conservative treatment; Other intervertebral disc degeneration, lumbar region     TECHNIQUE:  Multiplanar, multisequence MR images were acquired from the thoracolumbar junction to the sacrum without the administration of contrast.     COMPARISON:  08/13/2018     FINDINGS:  The vertebral bodies demonstrate no evidence of fracture, osseous destructive process or aggressive bone marrow replacement process.     Normal sagittal alignment is preserved.  No spondylolisthesis.     Spinal canal appears narrowed on a developmental basis.     Individual disc levels are further discussed below:     T11-12: Loss of disc height with degenerative endplate changes.  No large disc herniation or spinal stenosis.     T12-L1, L1-2, L2-3: No significant degenerative disc disease.  Mild facet arthropathy.  No significant spinal stenosis or neural foraminal narrowing.     L3-4: Modest endplate marginal osteophyte formation.  The left foraminal disc protrusion.  Facet hypertrophy with thickening ligamentum flavum.  Modest narrowing of the spinal canal diameter with bilateral lateral recess stenosis.  Mild left-sided neural  foraminal encroachment.     L4-5: Prior left-sided hemilaminotomy.  Advanced loss of disc height with some Modic type endplate changes and circumferential endplate marginal osteophyte formation.  Facet hypertrophy.  Bilateral lateral recess stenosis and neural foraminal encroachment.     L5-S1: Circumferential endplate marginal osteophyte formation and mild disc bulging.  Bilateral facet arthropathy, right more than left.  No significant spinal stenosis or neural foraminal narrowing.     The terminal spinal cord, conus, and cauda equina demonstrate normal caliber, morphology, and signal.     No intraspinal mass or fluid collection.     No acute abnormalities in the visualized paraspinal soft tissue structures.     Impression:     Prior L4-5 hemilaminotomy for presumed micro discectomy.     Spinal canal appears mildly narrowed on a developmental basis.     Mild superimposed degenerative change as above, most pronounced at L3-4 and L4-5.        Electronically signed by:Brandyn Reyes MD  Date:                                            05/18/2024  Time:                                           15:36    Narrative     EXAMINATION:  MRI LUMBAR SPINE WITHOUT CONTRAST    CLINICAL HISTORY:  radicular leg pain; Arthrodesis status    TECHNIQUE:  Multiplanar, multisequence MR images were acquired from the thoracolumbar junction to the sacrum without the administration of contrast.    COMPARISON:  None.    FINDINGS:  Small portions of this exam are limited by patient motion.    The vertebral bodies maintain normal height, signal intensity and alignment.  There is multilevel disc desiccation with loss of disc height identified at L4-L5 and L5-S1.  This is most advanced at L4-L5.  The conus terminates at level L1.  Evaluation of the localizer images and structures surrounding the lumbar spine reveal no abnormalities.  There is mild central canal stenosis throughout likely related to congenitally shortened pedicles.    L1-L2: No  significant disc or joint pathology.    L2-L3: Mild diffuse disc bulge present.  No facet arthropathy or ligamentum flavum hypertrophy.  The central canal and neural foramen are patent.    L3-L4: Mild diffuse disc bulge with very mild bilateral facet arthropathy and some ligamentum flavum hypertrophy.  There is mild left neural foraminal stenosis.  The right neural foramen is patent.    L4-L5: There is a large diffuse disc bulge with moderate to severe bilateral facet arthropathy and no significant ligamentum flavum hypertrophy.  No significant central canal stenosis however, there is moderate bilateral neural foraminal stenosis.    L5-S1: Mild diffuse disc bulge.  Moderate to severe right and mild-to-moderate left facet arthropathy present.  No significant ligamentum flavum hypertrophy.  The neural foramen are patent.   Impression       Multilevel degenerative disc and joint disease is present.  Congenitally shortened pedicles likely contributes to the mild central canal stenosis throughout the lumbar spine.  No severe central canal or neural foraminal stenosis at any level.      Electronically signed by: Linda Sadler MD  Date: 08/13/2018  Time: 13:54         Narrative     History: Cervicalgia.    Procedure: Sagittal T1, T2, STIR sequences and axial T2-weighted sequence and a gradient echo sequence is performed.    Comparison study none    Findings:    There is normal cervical lordosis. No marrow replacing lesions or fractures noted. Craniovertebral alignment is within normal limits. There is no Chiari type malformations. The signal intensity within the cervical cord is within normal limits.    C2-3: No disc herniations or spinal stenosis.    C3-4: There is disc dehydration. No significant disc herniations or spinal stenosis. There is at least moderate right foraminal stenosis and mild left foraminal stenosis. No cord compression.    C4-5: Approximately 2 mm posterior subluxation of C4 on C5 associated with  disc dehydration and a posterior disc bulge and osteophyte complex. Effacement of the anterior subarachnoid space with slight dorsal displacement and compression of the cord is noted (image 10 series 7 and 6). There is severe bilateral foraminal stenosis. At least mild central canal spinal stenosis.    C5-6: Severe disc degeneration with disc space narrowing and global anterior and posterior osteophyte and disc bulge complex. There is posterior displacement of the cervical cord with slight flattening of the cervical cord. There is central canal spinal stenosis. There is severe right foraminal stenosis and moderate left foraminal stenosis.    C6-7: Mild broad-based left paracentral disc herniation and osteophyte complex without cord compression or spinal stenosis. There is a moderate to severe left foraminal stenosis and moderate right foraminal stenosis.    C7-T1: No significant central canal spinal stenosis or disc herniations or foraminal stenosis.   Impression         1. Multilevel degenerative disc disease as above. There is spinal stenosis and foraminal stenotic changes more pronounced at C4-5 and C5-6 disc spaces. See details at each disc space level above.      Electronically signed by: DELL TELLEZ MD  Date: 12/12/17  Time: 14:27          Assessment:       Encounter Diagnoses   Name Primary?    DDD (degenerative disc disease), lumbar Yes    Lumbar radiculopathy     Lumbar spondylosis     Diabetic peripheral neuropathy associated with type 2 diabetes mellitus          Plan:       Cortes was seen today for follow-up.    Diagnoses and all orders for this visit:    DDD (degenerative disc disease), lumbar  -     tiZANidine (ZANAFLEX) 4 MG tablet; Take 2 tablets (8 mg total) by mouth nightly as needed (as needed for muscle spasms).    Lumbar radiculopathy    Lumbar spondylosis    Diabetic peripheral neuropathy associated with type 2 diabetes mellitus          Cortes Schroeder is a 64 y.o. male with chronic  bilateral lower back and left lower extremity pain.  Subjectively neuropathic in nature.  Suspect pain is multifactorial.  Initially was having pain consistent with a left lower lumbar radiculopathy, this has somewhat improved with PT.  Also with pain across his lower lumbar region which suspect is axial likely related to lower lumbar facet degeneration.  Lumbar MRI with multilevel degenerative changes, most notable at L4-5 with moderate to severe bilateral foraminal stenosis at this level.  Also notable facet arthropathy at L5-S1 and to a lesser degree at L4-5.  Notably worse on the right at L5-S1.  Patient's left foot/toe pain unclear if radicular.  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  Significant relief with bilateral L5 TF YVONNE.    Prior records reviewed.  Pertinent imaging studies reviewed by me. Imaging results were discussed with patient.  Patient is s/p bilateral L5 transforaminal epidural steroid injection with 90% relief.  Patient noting improvement ADLs.  Consider repeating in the future if appropriate.  Continue gabapentin.  Patient currently taking 100 mg in the morning and typically takes 300 mg q.h.s. although often requires upward of 1200 mg at night in order to get relief to help him sleep.  Continue tizanidine 4 mg q.h.s. p.r.n..  Refills provided.  Stressed the importance of maintaining regular home exercise program and being mindful of how they use their back throughout the day.  Patient expressed understanding and agreement.  Return to clinic as needed.      Alf Mercado PA-C  [facility]  Interventional Pain Management  07/01/2024    This note was created by combination of typed  and M-Modal dictation.  Transcription and phonetic errors may be present.  If there are any questions, please contact me.

## 2024-07-18 ENCOUNTER — HOSPITAL ENCOUNTER (INPATIENT)
Facility: HOSPITAL | Age: 64
LOS: 3 days | Discharge: NURSING, REHAB OR LONG-TERM CARE | DRG: 493 | End: 2024-07-23
Attending: EMERGENCY MEDICINE | Admitting: HOSPITALIST
Payer: COMMERCIAL

## 2024-07-18 ENCOUNTER — ANESTHESIA EVENT (OUTPATIENT)
Dept: SURGERY | Facility: HOSPITAL | Age: 64
End: 2024-07-18
Payer: COMMERCIAL

## 2024-07-18 ENCOUNTER — ANESTHESIA (OUTPATIENT)
Dept: SURGERY | Facility: HOSPITAL | Age: 64
End: 2024-07-18
Payer: COMMERCIAL

## 2024-07-18 DIAGNOSIS — S52.502A CLOSED FRACTURE OF DISTAL END OF LEFT RADIUS, UNSPECIFIED FRACTURE MORPHOLOGY, INITIAL ENCOUNTER: Primary | ICD-10-CM

## 2024-07-18 DIAGNOSIS — R42 DIZZINESS: ICD-10-CM

## 2024-07-18 DIAGNOSIS — R55 SYNCOPE AND COLLAPSE: ICD-10-CM

## 2024-07-18 DIAGNOSIS — Z96.41 INSULIN PUMP STATUS: ICD-10-CM

## 2024-07-18 DIAGNOSIS — E66.09 CLASS 1 OBESITY DUE TO EXCESS CALORIES WITH SERIOUS COMORBIDITY AND BODY MASS INDEX (BMI) OF 33.0 TO 33.9 IN ADULT: ICD-10-CM

## 2024-07-18 DIAGNOSIS — S82.851A CLOSED TRIMALLEOLAR FRACTURE OF RIGHT ANKLE, INITIAL ENCOUNTER: Primary | ICD-10-CM

## 2024-07-18 DIAGNOSIS — T14.8XXA FRACTURE: ICD-10-CM

## 2024-07-18 DIAGNOSIS — R42 LIGHTHEADEDNESS: ICD-10-CM

## 2024-07-18 DIAGNOSIS — E11.65 UNCONTROLLED TYPE 2 DIABETES MELLITUS WITH HYPERGLYCEMIA: ICD-10-CM

## 2024-07-18 DIAGNOSIS — S82.851A CLOSED TRIMALLEOLAR FRACTURE OF RIGHT ANKLE, INITIAL ENCOUNTER: ICD-10-CM

## 2024-07-18 DIAGNOSIS — Z72.52 HIGH RISK HOMOSEXUAL BEHAVIOR: ICD-10-CM

## 2024-07-18 DIAGNOSIS — R07.9 CHEST PAIN: ICD-10-CM

## 2024-07-18 DIAGNOSIS — E66.9 OBESITY, UNSPECIFIED CLASSIFICATION, UNSPECIFIED OBESITY TYPE, UNSPECIFIED WHETHER SERIOUS COMORBIDITY PRESENT: ICD-10-CM

## 2024-07-18 PROBLEM — Z79.4 UNCONTROLLED TYPE 2 DIABETES MELLITUS WITH HYPERGLYCEMIA, WITH LONG-TERM CURRENT USE OF INSULIN: Status: ACTIVE | Noted: 2023-10-13

## 2024-07-18 PROBLEM — R29.6 MULTIPLE FALLS: Status: ACTIVE | Noted: 2024-07-18

## 2024-07-18 PROBLEM — E11.42 POLYNEUROPATHY DUE TO TYPE 2 DIABETES MELLITUS: Status: ACTIVE | Noted: 2023-10-13

## 2024-07-18 PROBLEM — R79.89 ELEVATED SERUM CREATININE: Status: ACTIVE | Noted: 2024-07-18

## 2024-07-18 PROBLEM — E87.1 HYPONATREMIA: Status: ACTIVE | Noted: 2024-07-18

## 2024-07-18 PROBLEM — I10 HYPERTENSION, ESSENTIAL: Status: ACTIVE | Noted: 2023-06-19

## 2024-07-18 LAB
25(OH)D3+25(OH)D2 SERPL-MCNC: 40 NG/ML (ref 30–96)
ABO + RH BLD: NORMAL
ALBUMIN SERPL BCP-MCNC: 3.6 G/DL (ref 3.5–5.2)
ALP SERPL-CCNC: 147 U/L (ref 55–135)
ALT SERPL W/O P-5'-P-CCNC: 38 U/L (ref 10–44)
ANION GAP SERPL CALC-SCNC: 10 MMOL/L (ref 8–16)
AST SERPL-CCNC: 22 U/L (ref 10–40)
BASOPHILS # BLD AUTO: 0.08 K/UL (ref 0–0.2)
BASOPHILS NFR BLD: 0.6 % (ref 0–1.9)
BILIRUB SERPL-MCNC: 0.4 MG/DL (ref 0.1–1)
BLD GP AB SCN CELLS X3 SERPL QL: NORMAL
BUN SERPL-MCNC: 35 MG/DL (ref 8–23)
CALCIUM SERPL-MCNC: 9.9 MG/DL (ref 8.7–10.5)
CHLORIDE SERPL-SCNC: 100 MMOL/L (ref 95–110)
CO2 SERPL-SCNC: 23 MMOL/L (ref 23–29)
CREAT SERPL-MCNC: 1.8 MG/DL (ref 0.5–1.4)
DIFFERENTIAL METHOD BLD: ABNORMAL
EOSINOPHIL # BLD AUTO: 0.2 K/UL (ref 0–0.5)
EOSINOPHIL NFR BLD: 1.5 % (ref 0–8)
ERYTHROCYTE [DISTWIDTH] IN BLOOD BY AUTOMATED COUNT: 12.8 % (ref 11.5–14.5)
EST. GFR  (NO RACE VARIABLE): 41.8 ML/MIN/1.73 M^2
ESTIMATED AVG GLUCOSE: 237 MG/DL (ref 68–131)
GLUCOSE SERPL-MCNC: 108 MG/DL (ref 70–110)
HBA1C MFR BLD: 9.9 % (ref 4–5.6)
HCT VFR BLD AUTO: 42.4 % (ref 40–54)
HCV AB SERPL QL IA: NORMAL
HGB BLD-MCNC: 15 G/DL (ref 14–18)
HIV 1+2 AB+HIV1 P24 AG SERPL QL IA: NORMAL
IMM GRANULOCYTES # BLD AUTO: 0.07 K/UL (ref 0–0.04)
IMM GRANULOCYTES NFR BLD AUTO: 0.5 % (ref 0–0.5)
INR PPP: 1 (ref 0.8–1.2)
LYMPHOCYTES # BLD AUTO: 1.6 K/UL (ref 1–4.8)
LYMPHOCYTES NFR BLD: 11.9 % (ref 18–48)
MAGNESIUM SERPL-MCNC: 2.1 MG/DL (ref 1.6–2.6)
MCH RBC QN AUTO: 30.1 PG (ref 27–31)
MCHC RBC AUTO-ENTMCNC: 35.4 G/DL (ref 32–36)
MCV RBC AUTO: 85 FL (ref 82–98)
MONOCYTES # BLD AUTO: 0.9 K/UL (ref 0.3–1)
MONOCYTES NFR BLD: 6.3 % (ref 4–15)
NEUTROPHILS # BLD AUTO: 10.9 K/UL (ref 1.8–7.7)
NEUTROPHILS NFR BLD: 79.2 % (ref 38–73)
NRBC BLD-RTO: 0 /100 WBC
PHOSPHATE SERPL-MCNC: 3.8 MG/DL (ref 2.7–4.5)
PLATELET # BLD AUTO: 258 K/UL (ref 150–450)
PMV BLD AUTO: 10.7 FL (ref 9.2–12.9)
POTASSIUM SERPL-SCNC: 4 MMOL/L (ref 3.5–5.1)
PREALB SERPL-MCNC: 13 MG/DL (ref 20–43)
PROT SERPL-MCNC: 7.3 G/DL (ref 6–8.4)
PROTHROMBIN TIME: 10.9 SEC (ref 9–12.5)
RBC # BLD AUTO: 4.99 M/UL (ref 4.6–6.2)
SODIUM SERPL-SCNC: 133 MMOL/L (ref 136–145)
SPECIMEN OUTDATE: NORMAL
T4 FREE SERPL-MCNC: 0.98 NG/DL (ref 0.71–1.51)
TROPONIN I SERPL DL<=0.01 NG/ML-MCNC: 0.01 NG/ML (ref 0–0.03)
TSH SERPL DL<=0.005 MIU/L-ACNC: 6.24 UIU/ML (ref 0.4–4)
WBC # BLD AUTO: 13.75 K/UL (ref 3.9–12.7)

## 2024-07-18 PROCEDURE — 96372 THER/PROPH/DIAG INJ SC/IM: CPT

## 2024-07-18 PROCEDURE — 71000033 HC RECOVERY, INTIAL HOUR: Performed by: ORTHOPAEDIC SURGERY

## 2024-07-18 PROCEDURE — 63600175 PHARM REV CODE 636 W HCPCS: Performed by: NURSE ANESTHETIST, CERTIFIED REGISTERED

## 2024-07-18 PROCEDURE — 25000003 PHARM REV CODE 250

## 2024-07-18 PROCEDURE — 86850 RBC ANTIBODY SCREEN: CPT

## 2024-07-18 PROCEDURE — 25000003 PHARM REV CODE 250: Performed by: NURSE ANESTHETIST, CERTIFIED REGISTERED

## 2024-07-18 PROCEDURE — 0QSGXZZ REPOSITION RIGHT TIBIA, EXTERNAL APPROACH: ICD-10-PCS | Performed by: ORTHOPAEDIC SURGERY

## 2024-07-18 PROCEDURE — 87389 HIV-1 AG W/HIV-1&-2 AB AG IA: CPT | Performed by: EMERGENCY MEDICINE

## 2024-07-18 PROCEDURE — 83036 HEMOGLOBIN GLYCOSYLATED A1C: CPT

## 2024-07-18 PROCEDURE — 82306 VITAMIN D 25 HYDROXY: CPT

## 2024-07-18 PROCEDURE — 80053 COMPREHEN METABOLIC PANEL: CPT

## 2024-07-18 PROCEDURE — 84443 ASSAY THYROID STIM HORMONE: CPT

## 2024-07-18 PROCEDURE — 63600175 PHARM REV CODE 636 W HCPCS: Performed by: ANESTHESIOLOGY

## 2024-07-18 PROCEDURE — 25000003 PHARM REV CODE 250: Performed by: ANESTHESIOLOGY

## 2024-07-18 PROCEDURE — 96374 THER/PROPH/DIAG INJ IV PUSH: CPT

## 2024-07-18 PROCEDURE — 86803 HEPATITIS C AB TEST: CPT | Performed by: EMERGENCY MEDICINE

## 2024-07-18 PROCEDURE — 27818 TREATMENT OF ANKLE FRACTURE: CPT | Mod: 51,RT,, | Performed by: ORTHOPAEDIC SURGERY

## 2024-07-18 PROCEDURE — 0QSJ35Z REPOSITION RIGHT FIBULA WITH EXTERNAL FIXATION DEVICE, PERCUTANEOUS APPROACH: ICD-10-PCS | Performed by: ORTHOPAEDIC SURGERY

## 2024-07-18 PROCEDURE — 84484 ASSAY OF TROPONIN QUANT: CPT

## 2024-07-18 PROCEDURE — 37000008 HC ANESTHESIA 1ST 15 MINUTES: Performed by: ORTHOPAEDIC SURGERY

## 2024-07-18 PROCEDURE — 83735 ASSAY OF MAGNESIUM: CPT

## 2024-07-18 PROCEDURE — 63600175 PHARM REV CODE 636 W HCPCS

## 2024-07-18 PROCEDURE — 93010 ELECTROCARDIOGRAM REPORT: CPT | Mod: ,,, | Performed by: INTERNAL MEDICINE

## 2024-07-18 PROCEDURE — 36000708 HC OR TIME LEV III 1ST 15 MIN: Performed by: ORTHOPAEDIC SURGERY

## 2024-07-18 PROCEDURE — 93005 ELECTROCARDIOGRAM TRACING: CPT

## 2024-07-18 PROCEDURE — 36000709 HC OR TIME LEV III EA ADD 15 MIN: Performed by: ORTHOPAEDIC SURGERY

## 2024-07-18 PROCEDURE — C1713 ANCHOR/SCREW BN/BN,TIS/BN: HCPCS | Performed by: ORTHOPAEDIC SURGERY

## 2024-07-18 PROCEDURE — 71000015 HC POSTOP RECOV 1ST HR: Performed by: ORTHOPAEDIC SURGERY

## 2024-07-18 PROCEDURE — 99285 EMERGENCY DEPT VISIT HI MDM: CPT | Mod: 25

## 2024-07-18 PROCEDURE — 27201423 OPTIME MED/SURG SUP & DEVICES STERILE SUPPLY: Performed by: ORTHOPAEDIC SURGERY

## 2024-07-18 PROCEDURE — 82962 GLUCOSE BLOOD TEST: CPT | Performed by: ORTHOPAEDIC SURGERY

## 2024-07-18 PROCEDURE — 0QSJXZZ REPOSITION RIGHT FIBULA, EXTERNAL APPROACH: ICD-10-PCS | Performed by: ORTHOPAEDIC SURGERY

## 2024-07-18 PROCEDURE — 71000016 HC POSTOP RECOV ADDL HR: Performed by: ORTHOPAEDIC SURGERY

## 2024-07-18 PROCEDURE — 37000009 HC ANESTHESIA EA ADD 15 MINS: Performed by: ORTHOPAEDIC SURGERY

## 2024-07-18 PROCEDURE — 85025 COMPLETE CBC W/AUTO DIFF WBC: CPT

## 2024-07-18 PROCEDURE — 84100 ASSAY OF PHOSPHORUS: CPT

## 2024-07-18 PROCEDURE — 86901 BLOOD TYPING SEROLOGIC RH(D): CPT

## 2024-07-18 PROCEDURE — 99223 1ST HOSP IP/OBS HIGH 75: CPT | Mod: 57,25,, | Performed by: ORTHOPAEDIC SURGERY

## 2024-07-18 PROCEDURE — 0QSG35Z REPOSITION RIGHT TIBIA WITH EXTERNAL FIXATION DEVICE, PERCUTANEOUS APPROACH: ICD-10-PCS | Performed by: ORTHOPAEDIC SURGERY

## 2024-07-18 PROCEDURE — 84439 ASSAY OF FREE THYROXINE: CPT

## 2024-07-18 PROCEDURE — 85610 PROTHROMBIN TIME: CPT

## 2024-07-18 PROCEDURE — 84134 ASSAY OF PREALBUMIN: CPT

## 2024-07-18 PROCEDURE — 86900 BLOOD TYPING SEROLOGIC ABO: CPT

## 2024-07-18 PROCEDURE — 20690 APPL UNIPLN UNI EXT FIXJ SYS: CPT | Mod: RT,,, | Performed by: ORTHOPAEDIC SURGERY

## 2024-07-18 DEVICE — PIN TRANFX EXFIX 6X225: Type: IMPLANTABLE DEVICE | Site: ANKLE | Status: FUNCTIONAL

## 2024-07-18 DEVICE — SCREW SCHANZ BONE 5X150 EX FIX: Type: IMPLANTABLE DEVICE | Site: ANKLE | Status: FUNCTIONAL

## 2024-07-18 DEVICE — CLAMP LG 4 POSITION MULTI-PIN: Type: IMPLANTABLE DEVICE | Site: ANKLE | Status: FUNCTIONAL

## 2024-07-18 DEVICE — ROD CARBON FIBER 11MM X 300MM: Type: IMPLANTABLE DEVICE | Site: ANKLE | Status: FUNCTIONAL

## 2024-07-18 DEVICE — CLAMP EXT FIX COMBO MDM 8-11MM: Type: IMPLANTABLE DEVICE | Site: ANKLE | Status: FUNCTIONAL

## 2024-07-18 DEVICE — IMPLANTABLE DEVICE: Type: IMPLANTABLE DEVICE | Site: ANKLE | Status: FUNCTIONAL

## 2024-07-18 DEVICE — CLAMP COMBINATION / LG EXT FIX: Type: IMPLANTABLE DEVICE | Site: ANKLE | Status: FUNCTIONAL

## 2024-07-18 DEVICE — SCREW BONE EF 30MM 4X100MM SS: Type: IMPLANTABLE DEVICE | Site: ANKLE | Status: FUNCTIONAL

## 2024-07-18 RX ORDER — CELECOXIB 200 MG/1
200 CAPSULE ORAL DAILY
Status: DISCONTINUED | OUTPATIENT
Start: 2024-07-18 | End: 2024-07-23 | Stop reason: HOSPADM

## 2024-07-18 RX ORDER — OXYCODONE HYDROCHLORIDE 5 MG/1
5 TABLET ORAL EVERY 4 HOURS PRN
Status: DISCONTINUED | OUTPATIENT
Start: 2024-07-18 | End: 2024-07-19

## 2024-07-18 RX ORDER — MIDAZOLAM HYDROCHLORIDE 1 MG/ML
INJECTION INTRAMUSCULAR; INTRAVENOUS
Status: DISCONTINUED | OUTPATIENT
Start: 2024-07-18 | End: 2024-07-18

## 2024-07-18 RX ORDER — CLINDAMYCIN PHOSPHATE 900 MG/50ML
INJECTION, SOLUTION INTRAVENOUS
Status: DISCONTINUED | OUTPATIENT
Start: 2024-07-18 | End: 2024-07-18

## 2024-07-18 RX ORDER — HALOPERIDOL 5 MG/ML
0.5 INJECTION INTRAMUSCULAR EVERY 10 MIN PRN
Status: DISCONTINUED | OUTPATIENT
Start: 2024-07-18 | End: 2024-07-19 | Stop reason: HOSPADM

## 2024-07-18 RX ORDER — FENTANYL CITRATE 50 UG/ML
25 INJECTION, SOLUTION INTRAMUSCULAR; INTRAVENOUS EVERY 5 MIN PRN
Status: DISCONTINUED | OUTPATIENT
Start: 2024-07-18 | End: 2024-07-19 | Stop reason: HOSPADM

## 2024-07-18 RX ORDER — ACETAMINOPHEN 325 MG/1
650 TABLET ORAL
Status: DISCONTINUED | OUTPATIENT
Start: 2024-07-18 | End: 2024-07-23 | Stop reason: HOSPADM

## 2024-07-18 RX ORDER — LIDOCAINE HYDROCHLORIDE 20 MG/ML
INJECTION INTRAVENOUS
Status: DISCONTINUED | OUTPATIENT
Start: 2024-07-18 | End: 2024-07-18

## 2024-07-18 RX ORDER — SODIUM CHLORIDE 0.9 % (FLUSH) 0.9 %
10 SYRINGE (ML) INJECTION
OUTPATIENT
Start: 2024-07-18

## 2024-07-18 RX ORDER — FENTANYL CITRATE 50 UG/ML
INJECTION, SOLUTION INTRAMUSCULAR; INTRAVENOUS
Status: DISCONTINUED | OUTPATIENT
Start: 2024-07-18 | End: 2024-07-18

## 2024-07-18 RX ORDER — ROSUVASTATIN CALCIUM 10 MG/1
10 TABLET, COATED ORAL NIGHTLY
COMMUNITY

## 2024-07-18 RX ORDER — CHOLECALCIFEROL (VITAMIN D3) 25 MCG
1000 TABLET ORAL DAILY
Status: DISCONTINUED | OUTPATIENT
Start: 2024-07-19 | End: 2024-07-19 | Stop reason: SDUPTHER

## 2024-07-18 RX ORDER — IBUPROFEN 200 MG
24 TABLET ORAL
Status: DISCONTINUED | OUTPATIENT
Start: 2024-07-18 | End: 2024-07-19

## 2024-07-18 RX ORDER — PROPOFOL 10 MG/ML
VIAL (ML) INTRAVENOUS
Status: DISCONTINUED | OUTPATIENT
Start: 2024-07-18 | End: 2024-07-18

## 2024-07-18 RX ORDER — GLUCAGON 1 MG
1 KIT INJECTION
Status: DISCONTINUED | OUTPATIENT
Start: 2024-07-18 | End: 2024-07-19

## 2024-07-18 RX ORDER — ASPIRIN 81 MG/1
81 TABLET ORAL DAILY
Status: ON HOLD | COMMUNITY
End: 2024-07-23 | Stop reason: HOSPADM

## 2024-07-18 RX ORDER — IBUPROFEN 200 MG
16 TABLET ORAL
Status: DISCONTINUED | OUTPATIENT
Start: 2024-07-18 | End: 2024-07-19

## 2024-07-18 RX ORDER — SUCCINYLCHOLINE CHLORIDE 20 MG/ML
INJECTION INTRAMUSCULAR; INTRAVENOUS
Status: DISCONTINUED | OUTPATIENT
Start: 2024-07-18 | End: 2024-07-18

## 2024-07-18 RX ORDER — METHOCARBAMOL 500 MG/1
1000 TABLET, FILM COATED ORAL 4 TIMES DAILY
Status: ON HOLD | COMMUNITY
Start: 2024-06-01 | End: 2024-07-23 | Stop reason: HOSPADM

## 2024-07-18 RX ORDER — PROPOFOL 10 MG/ML
90 VIAL (ML) INTRAVENOUS ONCE
Status: DISCONTINUED | OUTPATIENT
Start: 2024-07-18 | End: 2024-07-18

## 2024-07-18 RX ORDER — LANOLIN ALCOHOL/MO/W.PET/CERES
1000 CREAM (GRAM) TOPICAL DAILY
COMMUNITY

## 2024-07-18 RX ORDER — CEFAZOLIN SODIUM 1 G/3ML
INJECTION, POWDER, FOR SOLUTION INTRAMUSCULAR; INTRAVENOUS
Status: DISCONTINUED | OUTPATIENT
Start: 2024-07-18 | End: 2024-07-18

## 2024-07-18 RX ORDER — ACETAMINOPHEN 10 MG/ML
INJECTION, SOLUTION INTRAVENOUS
Status: DISCONTINUED | OUTPATIENT
Start: 2024-07-18 | End: 2024-07-18

## 2024-07-18 RX ORDER — ROCURONIUM BROMIDE 10 MG/ML
INJECTION, SOLUTION INTRAVENOUS
Status: DISCONTINUED | OUTPATIENT
Start: 2024-07-18 | End: 2024-07-18

## 2024-07-18 RX ORDER — LIDOCAINE HYDROCHLORIDE 10 MG/ML
20 INJECTION INFILTRATION; PERINEURAL ONCE
Status: COMPLETED | OUTPATIENT
Start: 2024-07-18 | End: 2024-07-18

## 2024-07-18 RX ORDER — VASOPRESSIN 20 [USP'U]/ML
INJECTION, SOLUTION INTRAMUSCULAR; SUBCUTANEOUS
Status: DISCONTINUED | OUTPATIENT
Start: 2024-07-18 | End: 2024-07-18

## 2024-07-18 RX ORDER — INSULIN ASPART 100 [IU]/ML
0-10 INJECTION, SOLUTION INTRAVENOUS; SUBCUTANEOUS
Status: DISCONTINUED | OUTPATIENT
Start: 2024-07-18 | End: 2024-07-19

## 2024-07-18 RX ORDER — ASPIRIN 81 MG/1
81 TABLET ORAL 2 TIMES DAILY
Status: DISCONTINUED | OUTPATIENT
Start: 2024-07-18 | End: 2024-07-23 | Stop reason: HOSPADM

## 2024-07-18 RX ORDER — KETOROLAC TROMETHAMINE 10 MG/1
10 TABLET, FILM COATED ORAL 4 TIMES DAILY PRN
Status: ON HOLD | COMMUNITY
Start: 2024-06-01 | End: 2024-07-19

## 2024-07-18 RX ORDER — SODIUM CHLORIDE 0.9 % (FLUSH) 0.9 %
10 SYRINGE (ML) INJECTION
Status: DISCONTINUED | OUTPATIENT
Start: 2024-07-18 | End: 2024-07-19 | Stop reason: HOSPADM

## 2024-07-18 RX ORDER — ONDANSETRON HYDROCHLORIDE 2 MG/ML
INJECTION, SOLUTION INTRAVENOUS
Status: DISCONTINUED | OUTPATIENT
Start: 2024-07-18 | End: 2024-07-18

## 2024-07-18 RX ORDER — PHENYLEPHRINE HCL IN 0.9% NACL 1 MG/10 ML
SYRINGE (ML) INTRAVENOUS
Status: DISCONTINUED | OUTPATIENT
Start: 2024-07-18 | End: 2024-07-18

## 2024-07-18 RX ORDER — MORPHINE SULFATE 4 MG/ML
4 INJECTION, SOLUTION INTRAMUSCULAR; INTRAVENOUS
Status: COMPLETED | OUTPATIENT
Start: 2024-07-18 | End: 2024-07-18

## 2024-07-18 RX ORDER — MUPIROCIN 20 MG/G
OINTMENT TOPICAL
OUTPATIENT
Start: 2024-07-18

## 2024-07-18 RX ORDER — TIRZEPATIDE 10 MG/.5ML
10 INJECTION, SOLUTION SUBCUTANEOUS WEEKLY
Status: ON HOLD | COMMUNITY
Start: 2024-06-28 | End: 2024-07-23

## 2024-07-18 RX ORDER — METHOCARBAMOL 500 MG/1
1000 TABLET, FILM COATED ORAL ONCE
Status: COMPLETED | OUTPATIENT
Start: 2024-07-18 | End: 2024-07-18

## 2024-07-18 RX ADMIN — MORPHINE SULFATE 4 MG: 4 INJECTION INTRAVENOUS at 04:07

## 2024-07-18 RX ADMIN — Medication 200 MCG: at 08:07

## 2024-07-18 RX ADMIN — INSULIN ASPART 1 UNITS: 100 INJECTION, SOLUTION INTRAVENOUS; SUBCUTANEOUS at 09:07

## 2024-07-18 RX ADMIN — VASOPRESSIN 1 UNITS: 20 INJECTION, SOLUTION INTRAMUSCULAR; SUBCUTANEOUS at 09:07

## 2024-07-18 RX ADMIN — MIDAZOLAM HYDROCHLORIDE 2 MG: 1 INJECTION, SOLUTION INTRAMUSCULAR; INTRAVENOUS at 08:07

## 2024-07-18 RX ADMIN — ACETAMINOPHEN 1000 MG: 10 INJECTION, SOLUTION INTRAVENOUS at 09:07

## 2024-07-18 RX ADMIN — SODIUM CHLORIDE, SODIUM GLUCONATE, SODIUM ACETATE, POTASSIUM CHLORIDE, MAGNESIUM CHLORIDE, SODIUM PHOSPHATE, DIBASIC, AND POTASSIUM PHOSPHATE: .53; .5; .37; .037; .03; .012; .00082 INJECTION, SOLUTION INTRAVENOUS at 09:07

## 2024-07-18 RX ADMIN — LIDOCAINE HYDROCHLORIDE 20 ML: 10 INJECTION, SOLUTION INFILTRATION; PERINEURAL at 05:07

## 2024-07-18 RX ADMIN — ROCURONIUM BROMIDE 40 MG: 10 INJECTION, SOLUTION INTRAVENOUS at 08:07

## 2024-07-18 RX ADMIN — OXYCODONE HYDROCHLORIDE 5 MG: 5 TABLET ORAL at 09:07

## 2024-07-18 RX ADMIN — FENTANYL CITRATE 25 MCG: 50 INJECTION INTRAMUSCULAR; INTRAVENOUS at 11:07

## 2024-07-18 RX ADMIN — SODIUM CHLORIDE, SODIUM GLUCONATE, SODIUM ACETATE, POTASSIUM CHLORIDE, MAGNESIUM CHLORIDE, SODIUM PHOSPHATE, DIBASIC, AND POTASSIUM PHOSPHATE: .53; .5; .37; .037; .03; .012; .00082 INJECTION, SOLUTION INTRAVENOUS at 08:07

## 2024-07-18 RX ADMIN — PROPOFOL 150 MG: 10 INJECTION, EMULSION INTRAVENOUS at 08:07

## 2024-07-18 RX ADMIN — LIDOCAINE HYDROCHLORIDE 100 MG: 20 INJECTION INTRAVENOUS at 08:07

## 2024-07-18 RX ADMIN — Medication 100 MCG: at 08:07

## 2024-07-18 RX ADMIN — FENTANYL CITRATE 50 MCG: 50 INJECTION, SOLUTION INTRAMUSCULAR; INTRAVENOUS at 08:07

## 2024-07-18 RX ADMIN — CEFAZOLIN 2 G: 330 INJECTION, POWDER, FOR SOLUTION INTRAMUSCULAR; INTRAVENOUS at 08:07

## 2024-07-18 RX ADMIN — VASOPRESSIN 1 UNITS: 20 INJECTION, SOLUTION INTRAMUSCULAR; SUBCUTANEOUS at 08:07

## 2024-07-18 RX ADMIN — ROCURONIUM BROMIDE 10 MG: 10 INJECTION, SOLUTION INTRAVENOUS at 08:07

## 2024-07-18 RX ADMIN — METHOCARBAMOL 1000 MG: 500 TABLET ORAL at 09:07

## 2024-07-18 RX ADMIN — ONDANSETRON 4 MG: 2 INJECTION INTRAMUSCULAR; INTRAVENOUS at 09:07

## 2024-07-18 RX ADMIN — SUGAMMADEX 200 MG: 100 INJECTION, SOLUTION INTRAVENOUS at 09:07

## 2024-07-18 RX ADMIN — CLINDAMYCIN IN 5 PERCENT DEXTROSE 900 MG: 18 INJECTION, SOLUTION INTRAVENOUS at 08:07

## 2024-07-18 RX ADMIN — SUCCINYLCHOLINE 140 MG: 20 INJECTION, SOLUTION INTRAMUSCULAR; INTRAVENOUS at 08:07

## 2024-07-18 RX ADMIN — ASPIRIN 81 MG: 81 TABLET, COATED ORAL at 09:07

## 2024-07-18 RX ADMIN — CELECOXIB 200 MG: 200 CAPSULE ORAL at 09:07

## 2024-07-18 NOTE — ED PROVIDER NOTES
Encounter Date: 7/18/2024       History     Chief Complaint   Patient presents with    Fall     Arrived via ems from home with c/o fall due to dizziness, + right ankle injury and deformity, + head injury, denies LOC, no blood thinner use     HPI    Patient is a 64-year-old male with a history of diabetes, hypertension, hyperlipidemia presenting to the ED after a significant fall.  Patient reports feeling lightheaded throughout the day and has had 4 falls.  The most recent fall, patient has sustained a closed right ankle fracture with deformity.    On EMS arrival, patient was hypotensive with a systolic pressure in the 70s.  He had received 500 cc fluid bolus and ketamine for pain, as his blood pressure would not tolerate narcotics.    Patient denying pain aside from his right leg and his sacrum.    Review of patient's allergies indicates:   Allergen Reactions    Invokana [canagliflozin] Anaphylaxis    Percocet [oxycodone-acetaminophen] Nausea Only and Hallucinations    Biaxin [clarithromycin]     Hydrocodone Other (See Comments)     Dizzy/nausea/hallucinations    Sulfa (sulfonamide antibiotics) Nausea Only and Rash     Past Medical History:   Diagnosis Date    Anxiety 12/18/2012    Back pain 12/18/2012    Cataract     Chronic pain syndrome 4/24/2016    Diabetes type 2, controlled 2/20/2016    Diabetic retinopathy     DM (diabetes mellitus) 12/18/2012    DM (diabetes mellitus), type 2, uncontrolled 11/16/2013    Essential hypertension 2/20/2016    Gastroesophageal reflux disease without esophagitis 2/20/2016    Hyperlipidemia 12/18/2012    Insomnia 8/7/2014    Neuropathy 11/16/2013     Past Surgical History:   Procedure Laterality Date    BACK SURGERY  2003    Lumbar Spine    CATARACT EXTRACTION      EPIDURAL STEROID INJECTION N/A 1/16/2019    Procedure: Injection, Steroid, Epidural Cervical;  Surgeon: Andrew Sinclair Jr., MD;  Location: [facility];  Service: Pain Management;  Laterality: N/A;  Cervical Epidural  Steroid Injection C7-T1    22737    Arrive @ 1240    EPIDURAL STEROID INJECTION N/A 2/20/2019    Procedure: Injection, Steroid, Epidural;  Surgeon: Andrew Sinclair Jr., MD;  Location: [facility] END[facility];  Service: Pain Management;  Laterality: N/A;  Lumbar Epidural Steroid Injection L4-5    57226    Arrive @ 1215 (requests latest time)    INJECTION, SPINE, LUMBOSACRAL, TRANSFORAMINAL APPROACH Bilateral 6/14/2024    Procedure: Bilateral L5 Transforaminal Epidural Steroid Injections;  Surgeon: Andrew Sinclair Jr., MD;  Location: [facility] PAIN MANAGEMENT;  Service: Pain Management;  Laterality: Bilateral;  @1300(given)  ASA last 6/8  Check BG  MD Sign.     Family History   Problem Relation Name Age of Onset    Cataracts Father      Hypertension Father      Parkinsonism Father      Alzheimer's disease Father      Migraines Mother      Hypertension Mother      Heart attack Mother      Amblyopia Neg Hx      Blindness Neg Hx      Glaucoma Neg Hx      Macular degeneration Neg Hx      Retinal detachment Neg Hx      Strabismus Neg Hx       Social History     Tobacco Use    Smoking status: Never    Smokeless tobacco: Never   Substance Use Topics    Alcohol use: Yes     Alcohol/week: 1.0 standard drink of alcohol     Types: 1 Glasses of wine per week     Comment: occasionally    Drug use: No     Physical Exam     Initial Vitals [07/18/24 1535]   BP Pulse Resp Temp SpO2   110/60 77 14 97.7 °F (36.5 °C) 97 %      MAP       --         Physical Exam    Constitutional: He appears well-developed and well-nourished. He is not diaphoretic. He appears distressed (2/2 pain).   HENT:   Head: Normocephalic and atraumatic.   Neck:   No midline tenderness     Pulmonary/Chest: No respiratory distress.   Abdominal: Abdomen is soft. He exhibits no distension. There is no abdominal tenderness.   Musculoskeletal:      Comments: Obvious closed deformity of R ankle with tenting on medial aspect  No hip pain, midline spinal tenderness     Neurological:  He is alert and oriented to person, place, and time.   Skin:   Cool   Psychiatric: He has a normal mood and affect. Thought content normal.         ED Course   Procedures  Labs Reviewed   CBC W/ AUTO DIFFERENTIAL - Abnormal; Notable for the following components:       Result Value    WBC 13.75 (*)     Gran # (ANC) 10.9 (*)     Immature Grans (Abs) 0.07 (*)     Gran % 79.2 (*)     Lymph % 11.9 (*)     All other components within normal limits    Narrative:     Release to patient->Immediate   COMPREHENSIVE METABOLIC PANEL - Abnormal; Notable for the following components:    Sodium 133 (*)     BUN 35 (*)     Creatinine 1.8 (*)     Alkaline Phosphatase 147 (*)     eGFR 41.8 (*)     All other components within normal limits    Narrative:     Release to patient->Immediate   TSH - Abnormal; Notable for the following components:    TSH 6.243 (*)     All other components within normal limits    Narrative:     Release to patient->Immediate   HEMOGLOBIN A1C - Abnormal; Notable for the following components:    Hemoglobin A1C 9.9 (*)     Estimated Avg Glucose 237 (*)     All other components within normal limits   PREALBUMIN - Abnormal; Notable for the following components:    Prealbumin 13 (*)     All other components within normal limits   HIV 1 / 2 ANTIBODY    Narrative:     Release to patient->Immediate   HEPATITIS C ANTIBODY    Narrative:     Release to patient->Immediate   TROPONIN I    Narrative:     Release to patient->Immediate   MAGNESIUM   PHOSPHORUS   PROTIME-INR   VITAMIN D   T4, FREE    Narrative:     Release to patient->Immediate   URINALYSIS, REFLEX TO URINE CULTURE   TYPE & SCREEN     EKG Readings: (Independently Interpreted)   Initial Reading: No STEMI. Rhythm: Normal Sinus Rhythm. Heart Rate: 78. Conduction: 1st Degree AV Block. T Waves Flipped: AVR and AVL. Parsons: Normal. Clinical Impression: AV Block - 1st Degree and Normal Sinus Rhythm       Imaging Results              SURG FL Surgery Fluoro Usage (Final  result)  Result time 07/18/24 21:24:49      Final result by Access, Silent  (07/18/24 21:24:49)                   Narrative:    See OP Notes for results.     IMPRESSION: See OP Notes for results.             This procedure was auto-finalized by: [facility] [employer]                                     X-Ray Wrist Complete Left (Final result)  Result time 07/18/24 20:13:48      Final result by Jay Hines MD (07/18/24 20:13:48)                   Impression:      Similar to slightly improved alignment of distal radius fracture post reduction.      Electronically signed by: Jay Hines MD  Date:    07/18/2024  Time:    20:13               Narrative:    EXAMINATION:  XR WRIST COMPLETE 3 VIEWS LEFT    CLINICAL HISTORY:  post red;    TECHNIQUE:  Three views of the left wrist.    COMPARISON:  07/18/2024.    FINDINGS:  Interval placement of overlying radiopaque cast material which limits fine bony and soft tissue detail.  Similar to mildly improved alignment distal radius fracture post reduction.  No new acute displaced fracture.  No dislocation.  Soft tissue edema about the wrist.  No unexpected radiopaque foreign body.                                       X-Ray Ankle Complete Left (Final result)  Result time 07/18/24 20:12:00      Final result by Jay Hines MD (07/18/24 20:12:00)                   Impression:      Mild soft tissue edema.  No acute displaced fracture.      Electronically signed by: Jay Hines MD  Date:    07/18/2024  Time:    20:12               Narrative:    EXAMINATION:  XR ANKLE COMPLETE 3 VIEW LEFT    CLINICAL HISTORY:  pain;    TECHNIQUE:  AP, lateral and oblique views of the left ankle were performed.    COMPARISON:  None.    FINDINGS:  No acute displaced fracture.  No dislocation.  Mild soft tissue edema, slightly worse over the lateral malleolus.  No unexpected radiopaque foreign body.  Atherosclerotic vascular calcifications.                                        X-Ray Pelvis Routine AP (Final result)  Result time 07/18/24 18:50:26      Final result by Roque Guillen MD (07/18/24 18:50:26)                   Impression:      Negative pelvis single view.      Electronically signed by: Roque Guillen  Date:    07/18/2024  Time:    18:50               Narrative:    EXAMINATION:  XR PELVIS ROUTINE AP    CLINICAL HISTORY:  pain;    TECHNIQUE:  AP view of the pelvis was performed.    COMPARISON:  None.    FINDINGS:  Pelvic ring is intact.  Degenerative changes in the lumbar spine.  No acute fracture or dislocation.                                       X-Ray Wrist Complete Left (Final result)  Result time 07/18/24 18:48:33      Final result by Carlos Espinoza MD (07/18/24 18:48:33)                   Impression:      1. Distal radial fracture as described.      Electronically signed by: Carlos Espinoza MD  Date:    07/18/2024  Time:    18:48               Narrative:    EXAMINATION:  XR WRIST COMPLETE 3 VIEWS LEFT    CLINICAL HISTORY:  pain;    TECHNIQUE:  PA, lateral, and oblique views of the left wrist were performed.    COMPARISON:  None    FINDINGS:  Three views left wrist.    There is osteopenia.  There are degenerative changes of the wrist.  There is fracture involving the medial aspect of the distal radius, extending to the radiocarpal articulation with impaction.  There is edema about the dorsal aspect of the wrist.  The distal ulna appears intact.                                       CT Ankle (Including Hindfoot) Without Contrast Right (Final result)  Result time 07/18/24 18:19:37      Final result by Carlos Espinoza MD (07/18/24 18:19:37)                   Impression:      1. Fractures of the distal tibia and fibula noting tibiotalar subluxation as described.      Electronically signed by: Carlos Espinoza MD  Date:    07/18/2024  Time:    18:19               Narrative:    EXAMINATION:  CT ANKLE (INCLUDING HINDFOOT) WITHOUT CONTRAST RIGHT; CT 3D RENDERING WO  INDEPENDENT WORKSTATION    CLINICAL HISTORY:  2mm cuts with 3d recons;; FX;    TECHNIQUE:  Axial images of the right ankle were obtained at 0.625 mm intervals without administration of IV contrast.  Coronal and sagittal reformatted images were reviewed.  3D reconstructed images were created on a separate workstation and reviewed.    COMPARISON:  Radiograph 07/18/2024    FINDINGS:  There is motion artifact.    Allowing for the above, there is a comminuted displaced fracture involving the distal aspect of the fibula noting several fracture fragments lie about the fracture plane.  There is comminuted fracture involving the distal aspect of the tibia, fracture planes extend to involve the medial malleolus and posterior aspect of the tibia noting several fracture fragments about the fracture plane.  The talus appears intact.  The calcaneus is intact.  The visualized portions of the kidney a forms and cuboid are intact.  The navicular is intact.  The proximal metatarsals are intact.  There is malalignment of the tibiotalar articulation noting lateral subluxation of the tibia in relationship to the talus.  There is edema about the fracture sites.  There is vascular calcification.  There are degenerative changes of the calcaneus.                                       CT 3D Rendering WO Independent Workstation (Final result)  Result time 07/18/24 18:19:37      Final result by Carlos Espinoza MD (07/18/24 18:19:37)                   Impression:      1. Fractures of the distal tibia and fibula noting tibiotalar subluxation as described.      Electronically signed by: Carlos Espinoza MD  Date:    07/18/2024  Time:    18:19               Narrative:    EXAMINATION:  CT ANKLE (INCLUDING HINDFOOT) WITHOUT CONTRAST RIGHT; CT 3D RENDERING WO INDEPENDENT WORKSTATION    CLINICAL HISTORY:  2mm cuts with 3d recons;; FX;    TECHNIQUE:  Axial images of the right ankle were obtained at 0.625 mm intervals without administration of IV  contrast.  Coronal and sagittal reformatted images were reviewed.  3D reconstructed images were created on a separate workstation and reviewed.    COMPARISON:  Radiograph 07/18/2024    FINDINGS:  There is motion artifact.    Allowing for the above, there is a comminuted displaced fracture involving the distal aspect of the fibula noting several fracture fragments lie about the fracture plane.  There is comminuted fracture involving the distal aspect of the tibia, fracture planes extend to involve the medial malleolus and posterior aspect of the tibia noting several fracture fragments about the fracture plane.  The talus appears intact.  The calcaneus is intact.  The visualized portions of the kidney a forms and cuboid are intact.  The navicular is intact.  The proximal metatarsals are intact.  There is malalignment of the tibiotalar articulation noting lateral subluxation of the tibia in relationship to the talus.  There is edema about the fracture sites.  There is vascular calcification.  There are degenerative changes of the calcaneus.                                       CT Cervical Spine Without Contrast (Final result)  Result time 07/18/24 19:16:28      Final result by Donald Nelson MD (07/18/24 19:16:28)                   Impression:      No evidence of acute intracranial pathology.  See above comments.    No acute fracture.    Multilevel degenerative changes in the cervical spine, moderate right neural foraminal narrowing at C5-C6.  No high-grade spinal canal stenosis.    Electronically signed by resident: Armin Bullock  Date:    07/18/2024  Time:    18:06    Electronically signed by: Donald Nelson  Date:    07/18/2024  Time:    19:16               Narrative:    EXAMINATION:  CT HEAD WITHOUT CONTRAST; CT CERVICAL SPINE WITHOUT CONTRAST    CLINICAL HISTORY:  Polytrauma, blunt;    TECHNIQUE:  Low dose axial CT images obtained throughout the head without the use of intravenous contrast.  Axial, sagittal and  coronal reconstructions were performed.    COMPARISON:  MRI brain 11/06/2017    FINDINGS:  Intracranial compartment:    Ventricles and sulci are normal in size for age without evidence of hydrocephalus.    Mild generalized cerebral volume loss.  Small probable remote lacunar infarct of the left lateral basal ganglia..  No parenchymal  hemorrhage, edema, mass effect or major vascular distribution infarct.    No extra-axial blood or fluid collections.    Skull/extracranial contents (limited evaluation):    No displaced calvarial fracture.    The mastoid air cells and visualized paranasal sinuses are essentially clear.    CERVICAL SPINE:    ALIGNMENT: Normal.    BONE: No fractures or focal osseous lesions.    JOINT: Multilevel disc height loss, severe at C4-C5. Advanced endplate sclerosis and osteophyte formation predominantly involving C4-C5, C5-C6, and C6-C7.    SPINAL CANAL: The cervical spinal cord is difficult to visualize.  No definite cord abnormalities.  No mass or collection.    PARASPINAL SOFT TISSUES: Subcentimeter right thyroid gland hyperdensity, likely a nodule.  Linear bandlike opacities primarily in the right apex, possibly representing subsegmental atelectasis or scarring.    SIGNIFICANT FINDINGS BY LEVEL:    C2-3: No spinal canal stenosis or neuroforaminal narrowing.    C3-4: Posterior disc osteophyte complex, mild uncovertebral spurring, and mild right facet arthropathy.  No spinal canal stenosis.  Severe right neuroforaminal narrowing.  .    C4-5: Calcified posterior disc osteophyte complex, uncovertebral spurring, and mild bilateral facet arthropathy resulting in mild spinal canal stenosis and mild bilateral neural foraminal narrowing.    C5-6: Posterior disc osteophyte complex, uncovertebral spurring, and mild bilateral facet arthropathy resulting in mild spinal canal stenosis and severe right foraminal narrowing    C6-7: Posterior disc osteophyte complex and uncovertebral spurring causing  moderate spinal canal stenosis and severe bilateral neural foraminal narrowing.    C7-T1: Posterior disc osteophyte complex.  No spinal canal stenosis or neuroforaminal narrowing.                                       CT Head Without Contrast (Final result)  Result time 07/18/24 19:16:28      Final result by Donald Nelson MD (07/18/24 19:16:28)                   Impression:      No evidence of acute intracranial pathology.  See above comments.    No acute fracture.    Multilevel degenerative changes in the cervical spine, moderate right neural foraminal narrowing at C5-C6.  No high-grade spinal canal stenosis.    Electronically signed by resident: Armin Bullock  Date:    07/18/2024  Time:    18:06    Electronically signed by: Donald Nelson  Date:    07/18/2024  Time:    19:16               Narrative:    EXAMINATION:  CT HEAD WITHOUT CONTRAST; CT CERVICAL SPINE WITHOUT CONTRAST    CLINICAL HISTORY:  Polytrauma, blunt;    TECHNIQUE:  Low dose axial CT images obtained throughout the head without the use of intravenous contrast.  Axial, sagittal and coronal reconstructions were performed.    COMPARISON:  MRI brain 11/06/2017    FINDINGS:  Intracranial compartment:    Ventricles and sulci are normal in size for age without evidence of hydrocephalus.    Mild generalized cerebral volume loss.  Small probable remote lacunar infarct of the left lateral basal ganglia..  No parenchymal  hemorrhage, edema, mass effect or major vascular distribution infarct.    No extra-axial blood or fluid collections.    Skull/extracranial contents (limited evaluation):    No displaced calvarial fracture.    The mastoid air cells and visualized paranasal sinuses are essentially clear.    CERVICAL SPINE:    ALIGNMENT: Normal.    BONE: No fractures or focal osseous lesions.    JOINT: Multilevel disc height loss, severe at C4-C5. Advanced endplate sclerosis and osteophyte formation predominantly involving C4-C5, C5-C6, and C6-C7.    SPINAL  CANAL: The cervical spinal cord is difficult to visualize.  No definite cord abnormalities.  No mass or collection.    PARASPINAL SOFT TISSUES: Subcentimeter right thyroid gland hyperdensity, likely a nodule.  Linear bandlike opacities primarily in the right apex, possibly representing subsegmental atelectasis or scarring.    SIGNIFICANT FINDINGS BY LEVEL:    C2-3: No spinal canal stenosis or neuroforaminal narrowing.    C3-4: Posterior disc osteophyte complex, mild uncovertebral spurring, and mild right facet arthropathy.  No spinal canal stenosis.  Severe right neuroforaminal narrowing.  .    C4-5: Calcified posterior disc osteophyte complex, uncovertebral spurring, and mild bilateral facet arthropathy resulting in mild spinal canal stenosis and mild bilateral neural foraminal narrowing.    C5-6: Posterior disc osteophyte complex, uncovertebral spurring, and mild bilateral facet arthropathy resulting in mild spinal canal stenosis and severe right foraminal narrowing    C6-7: Posterior disc osteophyte complex and uncovertebral spurring causing moderate spinal canal stenosis and severe bilateral neural foraminal narrowing.    C7-T1: Posterior disc osteophyte complex.  No spinal canal stenosis or neuroforaminal narrowing.                                       X-Ray Ankle Complete Right (Final result)  Result time 07/18/24 17:37:31      Final result by Carlos Espinoza MD (07/18/24 17:37:31)                   Impression:      As above      Electronically signed by: Carlos Espinoza MD  Date:    07/18/2024  Time:    17:37               Narrative:    EXAMINATION:  XR ANKLE COMPLETE 3 VIEW RIGHT    CLINICAL HISTORY:  post reduc;    TECHNIQUE:  AP, lateral, and oblique images of the right ankle were performed.    COMPARISON:  07/18/2024    FINDINGS:  Three views ankle.    Since the previous exam, there has been interval closed reduction in this patient with distal tibial and fibular fractures.  The ankle mortise now  appears intact.  There is improved approximation of the distal fibular fracture as well as the medial malleolar fracture.  There is more clearly defined posterior tibial fracture.  The proximal aspect of the foot appears intact.  CT could be performed for further delineation of the fractures as warranted.                                       X-Ray Chest AP Portable (Final result)  Result time 07/18/24 17:02:08      Final result by Carlos Espinoza MD (07/18/24 17:02:08)                   Impression:      1. Interstitial findings are accentuated by habitus, underlying edema not excluded.      Electronically signed by: Carlos Espinoza MD  Date:    07/18/2024  Time:    17:02               Narrative:    EXAMINATION:  XR CHEST AP PORTABLE    CLINICAL HISTORY:  fall;    TECHNIQUE:  Single frontal view of the chest was performed.    COMPARISON:  09/09/2019    FINDINGS:  The cardiomediastinal silhouette is not enlarged.  There is no pleural effusion.  The trachea is midline.  The lungs are symmetrically expanded bilaterally with coarse central hilar interstitial attenuation, accentuated by habitus and shallow inspiratory effort..  No large focal consolidation seen.  There is no pneumothorax.  The osseous structures are remarkable for degenerative change..                                       X-Ray Tibia Fibula 2 View Right (Final result)  Result time 07/18/24 17:00:26      Final result by Carlos Espinoza MD (07/18/24 17:00:26)                   Impression:      1. Distal tib fib fracture/dislocation as described.      Electronically signed by: Carlos Espinoza MD  Date:    07/18/2024  Time:    17:00               Narrative:    EXAMINATION:  XR ANKLE 2 VIEW RIGHT; XR TIBIA FIBULA 2 VIEW RIGHT    CLINICAL HISTORY:  FRACTURE;  Other injury of unspecified body region, initial encounter    COMPARISON:  None    FINDINGS:  Two views right ankle, three views right tibia fibula.    Allowing for positioning, there is an acute  displaced fracture involving the distal aspect of the fibula.  There is lateral dislocation of the tibiotalar articulation.  There is avulsion fracture of the medial malleolus.  There appears to be avulsion fracture involving the medial aspect of the tibia, possibly extending to the posterior aspect although not confirmed.  The calcaneus appears intact.  The talar dome appears intact.  The proximal aspects of the tibia and fibula are intact.  The knee is intact.  No large knee joint effusion.  There is vascular calcification.                                       X-Ray Foot 2 View Right (Final result)  Result time 07/18/24 16:49:21   Procedure changed from X-Ray Foot Complete Right     Final result by Carlos Espinoza MD (07/18/24 16:49:21)                   Impression:      1. In this patient with known tib fib fracture/dislocation, no definite acute displaced fracture or dislocation of the foot.  Following closed reduction, CT could be performed for further evaluation as warranted.      Electronically signed by: Carlos Espinoza MD  Date:    07/18/2024  Time:    16:49               Narrative:    EXAMINATION:  XR FOOT 2 VIEW RIGHT    CLINICAL HISTORY:  FRACTURE;  Other injury of unspecified body region, initial encounter    COMPARISON:  Ankle radiograph 07/18/2024    FINDINGS:  Two views right foot.    Please see separately dictated report for details of the distal tibia and fibula.    There is osteopenia.  No convincing acute displaced fracture or dislocation of the foot.  No radiopaque foreign body.                                       X-Ray Ankle 2 View Right (Final result)  Result time 07/18/24 17:00:26   Procedure changed from X-Ray Ankle Complete Right     Final result by Carlos Espinoza MD (07/18/24 17:00:26)                   Impression:      1. Distal tib fib fracture/dislocation as described.      Electronically signed by: Carlos Espinoza MD  Date:    07/18/2024  Time:    17:00                Narrative:    EXAMINATION:  XR ANKLE 2 VIEW RIGHT; XR TIBIA FIBULA 2 VIEW RIGHT    CLINICAL HISTORY:  FRACTURE;  Other injury of unspecified body region, initial encounter    COMPARISON:  None    FINDINGS:  Two views right ankle, three views right tibia fibula.    Allowing for positioning, there is an acute displaced fracture involving the distal aspect of the fibula.  There is lateral dislocation of the tibiotalar articulation.  There is avulsion fracture of the medial malleolus.  There appears to be avulsion fracture involving the medial aspect of the tibia, possibly extending to the posterior aspect although not confirmed.  The calcaneus appears intact.  The talar dome appears intact.  The proximal aspects of the tibia and fibula are intact.  The knee is intact.  No large knee joint effusion.  There is vascular calcification.                                       Medications   glucagon (human recombinant) injection 1 mg (has no administration in time range)   sodium chloride 0.9% flush 10 mL (has no administration in time range)   fentaNYL 50 mcg/mL injection 25 mcg (25 mcg Intravenous Given 7/18/24 2310)   haloperidol lactate injection 0.5 mg (has no administration in time range)   dextrose 10% bolus 125 mL 125 mL (has no administration in time range)   dextrose 10% bolus 250 mL 250 mL (has no administration in time range)   ceFAZolin 2 g in D5W 50 mL IVPB (MB+) (has no administration in time range)   vitamin D 1000 units tablet 1,000 Units (has no administration in time range)   acetaminophen tablet 650 mg (650 mg Oral Not Given 7/18/24 2130)   oxyCODONE immediate release tablet 5 mg (5 mg Oral Given 7/18/24 2152)   celecoxib capsule 200 mg (200 mg Oral Given 7/18/24 2152)   glucose chewable tablet 16 g (has no administration in time range)   glucose chewable tablet 24 g (has no administration in time range)   glucagon (human recombinant) injection 1 mg (has no administration in time range)   insulin aspart  U-100 pen 0-10 Units (1 Units Subcutaneous Given 7/18/24 2148)   dextrose 10% bolus 125 mL 125 mL (has no administration in time range)   dextrose 10% bolus 250 mL 250 mL (has no administration in time range)   aspirin EC tablet 81 mg (81 mg Oral Given 7/18/24 2156)   morphine injection 4 mg (4 mg Intravenous Given 7/18/24 1608)   LIDOcaine HCL 10 mg/ml (1%) injection 20 mL (20 mLs Intradermal Given by Provider 7/18/24 1715)   methocarbamoL tablet 1,000 mg (1,000 mg Oral Given 7/18/24 2152)     Medical Decision Making  Amount and/or Complexity of Data Reviewed  Labs: ordered.  Radiology: ordered.    Risk  Prescription drug management.    Patient is a 64-year-old male with a history of diabetes, hypertension, hyperlipidemia presenting to the ED after a significant fall. Patient reports feeling lightheaded throughout the day and has had 4 falls.     When EMS picked him up, he was hypotensive with a systolic of 70s. He had gotten a 500 cc bolus. He was not hypotensive here.     Labs and imaging ordered.  CBC and CMP relatively unremarkable, slight bump in creatinine from previous.  Troponin WNL.  CT head and neck unremarkable.  Right ankle with distal tib-fib fracture dislocation identified.  Consistent with physical exam, closed fracture.    After administration of pain medication, it is notable that patient required nasal cannula.    Orthopedic surgery consulted due to fracture with skin tenting.  They came to bedside and reduced after a hematoma block.    Patient also found to have left distal radius fracture.    Patient was admitted to the hospital medicine and taken by Orthopedic surgery for fixation of his right ankle.                                  Clinical Impression:  Final diagnoses:  [R42] Dizziness  [R42] Lightheadedness  [T14.8XXA] Fracture          ED Disposition Condition    Observation                 Suzan Bullock DO  Resident  07/19/24 0002

## 2024-07-18 NOTE — ED NOTES
Patient identifiers verified and correct for Cortes Schroeder  LOC: The patient is awake, alert and aware of environment with an appropriate affect, the patient is oriented x 3 and speaking appropriately.   APPEARANCE: Patient appears uncomfortable but in no acute distress, patient is clean and well groomed.  SKIN: The skin is warm and dry, color consistent with ethnicity, patient has normal skin turgor and moist mucus membranes, skin intact, no breakdown or bruising noted.   MUSCULOSKELETAL: Patient moving all extremities spontaneously, swelling and deformity noted to right ankle  RESPIRATORY: Airway is open and patent, respirations are spontaneous, patient has a normal effort and rate, no accessory muscle use noted, O2 Sat 97% on room air.  CARDIAC: Patient has a normal rate and regular rhythm, no edema noted, capillary refill < 3 seconds.   GASTRO: Soft and non tender to palpation, no distention noted, Pt states bowel movements have been regular.  : Pt denies any pain or frequency with urination.  NEURO: Pt opens eyes spontaneously, behavior appropriate to situation, follows commands, facial expression symmetrical, bilateral hand grasp equal and even, purposeful motor response noted, normal sensation in all extremities when touched with a finger.

## 2024-07-18 NOTE — ED TRIAGE NOTES
Fall (Arrived via ems from home with c/o fall due to dizziness, + right ankle injury and deformity, + head injury, denies LOC, no blood thinner use)

## 2024-07-18 NOTE — SUBJECTIVE & OBJECTIVE
Past Medical History:   Diagnosis Date    Anxiety 12/18/2012    Back pain 12/18/2012    Cataract     Chronic pain syndrome 4/24/2016    Diabetes type 2, controlled 2/20/2016    Diabetic retinopathy     DM (diabetes mellitus) 12/18/2012    DM (diabetes mellitus), type 2, uncontrolled 11/16/2013    Essential hypertension 2/20/2016    Gastroesophageal reflux disease without esophagitis 2/20/2016    Hyperlipidemia 12/18/2012    Insomnia 8/7/2014    Neuropathy 11/16/2013       Past Surgical History:   Procedure Laterality Date    BACK SURGERY  2003    Lumbar Spine    CATARACT EXTRACTION      EPIDURAL STEROID INJECTION N/A 1/16/2019    Procedure: Injection, Steroid, Epidural Cervical;  Surgeon: Andrew Sinclair Jr., MD;  Location: [facility] ENDO;  Service: Pain Management;  Laterality: N/A;  Cervical Epidural Steroid Injection C7-T1    25026    Arrive @ 1240    EPIDURAL STEROID INJECTION N/A 2/20/2019    Procedure: Injection, Steroid, Epidural;  Surgeon: Andrew Sinclair Jr., MD;  Location: [facility] ENDO;  Service: Pain Management;  Laterality: N/A;  Lumbar Epidural Steroid Injection L4-5    37937    Arrive @ 1215 (requests latest time)    INJECTION, SPINE, LUMBOSACRAL, TRANSFORAMINAL APPROACH Bilateral 6/14/2024    Procedure: Bilateral L5 Transforaminal Epidural Steroid Injections;  Surgeon: Andrew Sinclair Jr., MD;  Location: [facility] PAIN MANAGEMENT;  Service: Pain Management;  Laterality: Bilateral;  @1300(given)  ASA last 6/8  Check BG  MD Sign.       Review of patient's allergies indicates:   Allergen Reactions    Invokana [canagliflozin] Anaphylaxis    Percocet [oxycodone-acetaminophen] Nausea Only and Hallucinations    Biaxin [clarithromycin]     Hydrocodone Other (See Comments)     Dizzy/nausea/hallucinations    Sulfa (sulfonamide antibiotics) Nausea Only and Rash       Current Facility-Administered Medications   Medication    propofol (DIPRIVAN) 10 mg/mL IVP     Current Outpatient Medications   Medication Sig     DULoxetine (CYMBALTA) 60 MG capsule Take 1 capsule (60 mg total) by mouth once daily.    gabapentin (NEURONTIN) 100 MG capsule Take 1 capsule (100 mg total) by mouth once daily.    HUMALOG U-100 INSULIN 100 unit/mL injection Use in insulin pump; max dose 150 units per day.    losartan (COZAAR) 50 MG tablet TAKE 1 TABLET(50 MG) BY MOUTH EVERY DAY    blood-glucose sensor (DEXCOM G6 SENSOR) Omaira Change sensor every 10 days    blood-glucose transmitter (DEXCOM G6 TRANSMITTER) Omaira Change transmitter every 3 months    cinnamon bark (CINNAMON) 500 mg capsule Take 500 mg by mouth 2 (two) times a day.    emtricitabine-tenofovir 200-300 mg (TRUVADA) 200-300 mg Tab Take 1 tablet by mouth once daily.    fish oil-omega-3 fatty acids 300-1,000 mg capsule Take by mouth 2 (two) times daily.    gabapentin (NEURONTIN) 600 MG tablet Take 2 tablets (1,200 mg total) by mouth every evening.    insulin pump cartridge (OMNIPOD DASH 5 PACK POD) Crtg Apply new pod to skin every 1.5 days    insulin pump controller (OMNIPOD DASH PDM KIT MISC) by Misc.(Non-Drug; Combo Route) route.    L-CITRULLINE MISC by Misc.(Non-Drug; Combo Route) route Daily.    lancets Misc To check BG 4-7 times daily, to use with insurance preferred meter    magnesium oxide-Mg AA chelate (MG-PLUS-PROTEIN) 133 mg Tab Take 500 mg by mouth every evening.     nortriptyline (PAMELOR) 50 MG capsule Take 1 capsule (50 mg total) by mouth nightly.    OPW TEST CLAIM - DO NOT FILL Inject into the skin. OPW test claim. Do not fill.    rosuvastatin (CRESTOR) 5 MG tablet TAKE 1 TABLET(5 MG) BY MOUTH EVERY DAY    tiZANidine (ZANAFLEX) 4 MG tablet Take 2 tablets (8 mg total) by mouth nightly as needed (as needed for muscle spasms).    vitamin D 1000 units Tab Take 185 mg by mouth 2 (two) times daily.      Family History       Problem Relation (Age of Onset)    Alzheimer's disease Father    Cataracts Father    Heart attack Mother    Hypertension Father, Mother    Migraines Mother     "Parkinsonism Father          Tobacco Use    Smoking status: Never    Smokeless tobacco: Never   Substance and Sexual Activity    Alcohol use: Yes     Alcohol/week: 1.0 standard drink of alcohol     Types: 1 Glasses of wine per week     Comment: occasionally    Drug use: No    Sexual activity: Not Currently     Partners: Male     Birth control/protection: Condom     Comment: 10/2/17      ROS  Constitutional: negative for fevers  Eyes: negative visual changes  ENT: negative for hearing loss  Respiratory: negative for dyspnea  Cardiovascular: negative for chest pain  Gastrointestinal: negative for abdominal pain  Genitourinary: negative for dysuria  Neurological: negative for headaches. Positive for falls  Behavioral/Psych: negative for hallucinations  Endocrine: negative for temperature intolerance      Objective:     Vital Signs (Most Recent):  Temp: 97.7 °F (36.5 °C) (07/18/24 1535)  Pulse: 77 (07/18/24 1535)  Resp: 16 (07/18/24 1608)  BP: 110/60 (intial bp 79/40) (07/18/24 1535)  SpO2: 97 % (92 on  RA) (07/18/24 1535) Vital Signs (24h Range):  Temp:  [97.7 °F (36.5 °C)] 97.7 °F (36.5 °C)  Pulse:  [77] 77  Resp:  [14-16] 16  SpO2:  [97 %] 97 %  BP: (110)/(60) 110/60     Weight: 92.1 kg (203 lb)  Height: 5' 9" (175.3 cm)  Body mass index is 29.98 kg/m².      Intake/Output Summary (Last 24 hours) at 7/18/2024 1724  Last data filed at 7/18/2024 1537  Gross per 24 hour   Intake 500 ml   Output --   Net 500 ml        Ortho/SPM Exam   Gen:  No acute distress, well-developed, well nourished.  CV:  Peripherally well-perfused. 2+ radial pulses, symmetric.  Respiratory:  Normal respiratory effort. No accessory muscle use.   Head/Neck:  Normocephalic.  Atraumatic. Sclera anicteric. TM. Neck supple.  Neuro: No FND. Awake. Alert. Oriented to person, place, time, and situation.  Abdomen: Soft, NTND.      MSK:  Right Upper Extremity  Inspection  - Skin intact throughout, no open wounds  - No swelling  - No ecchymosis, " "erythema, or signs of cellulitis  Palpation  - NonTTP throughout, no palpable abnormality   Range of motion  - AROM and PROM of the shoulder, elbow, wrist, and hand intact  Stability  - No evidence of joint dislocation or abnormal laxity   Neurovascular  - AIN/PIN/Radial/Median/Ulnar Nerves assessed in isolation without deficit  - Able to give thumbs up, make "OK" sign, cross IF/LF, abduct/adduct fingers, make fist  - SILT throughout  - Compartments soft  - Radial artery 2+  - Muscle tone normal    Left Upper Extremity  Inspection  - Skin intact throughout, no open wounds  - No swelling  - No ecchymosis, erythema, or signs of cellulitis  Palpation  - TTP at the wrist  Range of motion  - AROM and PROM of the shoulder, elbow, wrist, and hand intact  Stability  - No evidence of joint dislocation or abnormal laxity  Neurovascular  - AIN/PIN/Radial/Median/Ulnar Nerves assessed in isolation without deficit  - Able to give thumbs up, make "OK" sign, cross IF/LF, abduct/adduct fingers, make fist  - SILT throughout  - Compartments soft  - Radial artery 2+  - Muscle tone normal      Right Lower Extremity  Inspection  - Skin intact throughout, no open wounds. Tenting present at the medial ankle  - moderate ankle swelling  - No ecchymosis, erythema, or signs of cellulitis  Palpation  - TTP at the ankle  Range of motion  - AROM and PROM of the hip, knee intact  Neurovascular  - TA/EHL/Gastroc/FHL assessed in isolation without deficit  - chronic paresthesia of the foot  - Compartments soft  - DP 2+   - Muscle tone normal    Left Lower Extremity  Inspection  - Skin intact throughout, no open wounds  - No swelling  - No ecchymosis, erythema, or signs of cellulitis  Palpation  - NonTTP throughout, no palpable abnormality   Range of motion  - AROM and PROM of the hip, knee, ankle, and foot intact  Stability  - No evidence of joint dislocation or abnormal laxity,   Neurovascular  - TA/EHL/Gastroc/FHL assessed in isolation without " deficit  - chronic paresthesia of the foot  - Compartments soft  - DP 2+   - Negative Log roll  - Negative Stinchfield  - Muscle tone normal      Significant Labs: CBC:   Recent Labs   Lab 07/18/24  1617   WBC 13.75*   HGB 15.0   HCT 42.4        CMP:   Recent Labs   Lab 07/18/24  1617   *   K 4.0      CO2 23      BUN 35*   CREATININE 1.8*   CALCIUM 9.9   PROT 7.3   ALBUMIN 3.6   BILITOT 0.4   ALKPHOS 147*   AST 22   ALT 38   ANIONGAP 10     All pertinent labs within the past 24 hours have been reviewed.    Significant Imaging: I have reviewed and interpreted all pertinent imaging results/findings.  Xr right ankle revealed Fracture dislocation with lateral displacement of the foot.

## 2024-07-18 NOTE — PROVIDER PROGRESS NOTES - EMERGENCY DEPT.
Encounter Date: 7/18/2024    ED Physician Progress Notes          Physician Attestation Statement for Resident:  As the supervising MD   Physician Attestation Statement: I have personally seen and examined this patient.       I agree with the history unless otherwise noted.     As the supervising MD I agree with the PE unless otherwise noted.      I have reviewed and agree with the residents interpretation of the following unless otherwise noted:   I have personally reviewed and interpreted the patients laboratory studies: WBC 13, Na 133, trop 0.013, FT4 WNL  I have personally reviewed and interpreted imaging studies: displaced and angulated fracture/dislocation distal tib/fib  I have personally reviewed and interpreted the patient's EKG: NSR @ 78bpm, no ischemic ST/TW changes      I have also reviewed the following:   external records: no prior echo available for review    As the supervising MD I agree with the treatment, course, plan, and disposition unless otherwise noted.    Critical Care   Date: 07/18/2024  Performed by: Lizbeth Granger MD   Authorized by: Lizbeth Granger MD      Total critical care time (exclusive of procedural time) : 35 minutes, exclusive of separately billable procedures and treating other patients and teaching time.    Critical care was necessary to treat or prevent imminent or life-threatening deterioration of the following conditions:  distal tib/fib fracture    Due to a high probability of clinically significant, life threatening deterioration, the patient required my highest level of preparedness to intervene emergently and I personally spent this critical care time directly and personally managing the patient. This critical care time included obtaining a history; examining the patient; pulse oximetry; ordering and review of studies; arranging urgent treatment with development of a management plan; evaluation of patient's response to treatment; frequent reassessment, documentation, and,  discussions with other providers, patient and family members.    Patient with unstable distal tib/fib fracture despite reduction and thus plan for emergent ex-fix with orthopedics

## 2024-07-18 NOTE — HPI
Cortes Schroeder is a 64 y.o. male with Pmhx of DM2 presenting with right ankle fracture after a glf 3 hours prior to exam. They were unable to bear weight after the injury. Patient denies any head trauma or LOC. They report multiple falls, 4x today. Patient denies numbness and tingling. Denies any other musculoskeletal pain or injuries. No known history of prior injury or surgery. Walks w/out assisted devices at baseline. Doesn't take any anticoagulation at baseline. Had water 4 hours prior to my exam    They deny IV drug use  They deny tobacco use.   They report alcohol use.   They deny immunosuppressant medications.  They deny chemotherapy.  They deny radiation therapy.

## 2024-07-19 ENCOUNTER — ANESTHESIA EVENT (OUTPATIENT)
Dept: SURGERY | Facility: HOSPITAL | Age: 64
DRG: 493 | End: 2024-07-19
Payer: COMMERCIAL

## 2024-07-19 ENCOUNTER — ANESTHESIA (OUTPATIENT)
Dept: SURGERY | Facility: HOSPITAL | Age: 64
DRG: 493 | End: 2024-07-19
Payer: COMMERCIAL

## 2024-07-19 PROBLEM — E66.09 CLASS 1 OBESITY DUE TO EXCESS CALORIES WITH SERIOUS COMORBIDITY AND BODY MASS INDEX (BMI) OF 33.0 TO 33.9 IN ADULT: Status: ACTIVE | Noted: 2024-07-19

## 2024-07-19 PROBLEM — E66.811 CLASS 1 OBESITY DUE TO EXCESS CALORIES WITH SERIOUS COMORBIDITY AND BODY MASS INDEX (BMI) OF 33.0 TO 33.9 IN ADULT: Status: ACTIVE | Noted: 2024-07-19

## 2024-07-19 LAB
ALBUMIN SERPL BCP-MCNC: 3 G/DL (ref 3.5–5.2)
ALP SERPL-CCNC: 122 U/L (ref 55–135)
ALT SERPL W/O P-5'-P-CCNC: 31 U/L (ref 10–44)
ANION GAP SERPL CALC-SCNC: 11 MMOL/L (ref 8–16)
AST SERPL-CCNC: 18 U/L (ref 10–40)
BACTERIA #/AREA URNS AUTO: NORMAL /HPF
BASOPHILS # BLD AUTO: 0.05 K/UL (ref 0–0.2)
BASOPHILS NFR BLD: 0.4 % (ref 0–1.9)
BILIRUB SERPL-MCNC: 0.3 MG/DL (ref 0.1–1)
BILIRUB UR QL STRIP: NEGATIVE
BUN SERPL-MCNC: 30 MG/DL (ref 8–23)
CALCIUM SERPL-MCNC: 8.7 MG/DL (ref 8.7–10.5)
CHLORIDE SERPL-SCNC: 98 MMOL/L (ref 95–110)
CLARITY UR REFRACT.AUTO: CLEAR
CO2 SERPL-SCNC: 24 MMOL/L (ref 23–29)
COLOR UR AUTO: YELLOW
CREAT SERPL-MCNC: 1.6 MG/DL (ref 0.5–1.4)
DIFFERENTIAL METHOD BLD: ABNORMAL
EOSINOPHIL # BLD AUTO: 0.1 K/UL (ref 0–0.5)
EOSINOPHIL NFR BLD: 0.7 % (ref 0–8)
ERYTHROCYTE [DISTWIDTH] IN BLOOD BY AUTOMATED COUNT: 13 % (ref 11.5–14.5)
EST. GFR  (NO RACE VARIABLE): 48.1 ML/MIN/1.73 M^2
GLUCOSE SERPL-MCNC: 236 MG/DL (ref 70–110)
GLUCOSE UR QL STRIP: ABNORMAL
HCT VFR BLD AUTO: 39.9 % (ref 40–54)
HGB BLD-MCNC: 13.8 G/DL (ref 14–18)
HGB UR QL STRIP: NEGATIVE
IMM GRANULOCYTES # BLD AUTO: 0.02 K/UL (ref 0–0.04)
IMM GRANULOCYTES NFR BLD AUTO: 0.2 % (ref 0–0.5)
KETONES UR QL STRIP: ABNORMAL
LEUKOCYTE ESTERASE UR QL STRIP: NEGATIVE
LYMPHOCYTES # BLD AUTO: 1.7 K/UL (ref 1–4.8)
LYMPHOCYTES NFR BLD: 15.3 % (ref 18–48)
MAGNESIUM SERPL-MCNC: 2.2 MG/DL (ref 1.6–2.6)
MCH RBC QN AUTO: 29.7 PG (ref 27–31)
MCHC RBC AUTO-ENTMCNC: 34.6 G/DL (ref 32–36)
MCV RBC AUTO: 86 FL (ref 82–98)
MICROSCOPIC COMMENT: NORMAL
MONOCYTES # BLD AUTO: 0.7 K/UL (ref 0.3–1)
MONOCYTES NFR BLD: 6.4 % (ref 4–15)
NEUTROPHILS # BLD AUTO: 8.6 K/UL (ref 1.8–7.7)
NEUTROPHILS NFR BLD: 77 % (ref 38–73)
NITRITE UR QL STRIP: NEGATIVE
NRBC BLD-RTO: 0 /100 WBC
OHS QRS DURATION: 84 MS
OHS QTC CALCULATION: 419 MS
PH UR STRIP: 6 [PH] (ref 5–8)
PHOSPHATE SERPL-MCNC: 4.8 MG/DL (ref 2.7–4.5)
PLATELET # BLD AUTO: 242 K/UL (ref 150–450)
PMV BLD AUTO: 10.9 FL (ref 9.2–12.9)
POCT GLUCOSE: 171 MG/DL (ref 70–110)
POCT GLUCOSE: 190 MG/DL (ref 70–110)
POCT GLUCOSE: 224 MG/DL (ref 70–110)
POCT GLUCOSE: 249 MG/DL (ref 70–110)
POCT GLUCOSE: 252 MG/DL (ref 70–110)
POCT GLUCOSE: 264 MG/DL (ref 70–110)
POTASSIUM SERPL-SCNC: 4.5 MMOL/L (ref 3.5–5.1)
PROT SERPL-MCNC: 6.3 G/DL (ref 6–8.4)
PROT UR QL STRIP: ABNORMAL
RBC # BLD AUTO: 4.65 M/UL (ref 4.6–6.2)
RBC #/AREA URNS AUTO: 0 /HPF (ref 0–4)
SODIUM SERPL-SCNC: 133 MMOL/L (ref 136–145)
SP GR UR STRIP: 1.02 (ref 1–1.03)
SQUAMOUS #/AREA URNS AUTO: 0 /HPF
URN SPEC COLLECT METH UR: ABNORMAL
WBC # BLD AUTO: 11.17 K/UL (ref 3.9–12.7)
WBC #/AREA URNS AUTO: 1 /HPF (ref 0–5)
YEAST UR QL AUTO: NORMAL

## 2024-07-19 PROCEDURE — 63600175 PHARM REV CODE 636 W HCPCS: Mod: JZ,JG | Performed by: NURSE ANESTHETIST, CERTIFIED REGISTERED

## 2024-07-19 PROCEDURE — 64415 NJX AA&/STRD BRCH PLXS IMG: CPT

## 2024-07-19 PROCEDURE — 25000003 PHARM REV CODE 250: Performed by: HOSPITALIST

## 2024-07-19 PROCEDURE — 63600175 PHARM REV CODE 636 W HCPCS: Performed by: NURSE PRACTITIONER

## 2024-07-19 PROCEDURE — 71000015 HC POSTOP RECOV 1ST HR: Performed by: ORTHOPAEDIC SURGERY

## 2024-07-19 PROCEDURE — 82962 GLUCOSE BLOOD TEST: CPT | Performed by: ORTHOPAEDIC SURGERY

## 2024-07-19 PROCEDURE — 37000008 HC ANESTHESIA 1ST 15 MINUTES: Performed by: ORTHOPAEDIC SURGERY

## 2024-07-19 PROCEDURE — 99214 OFFICE O/P EST MOD 30 MIN: CPT | Mod: ,,, | Performed by: NURSE PRACTITIONER

## 2024-07-19 PROCEDURE — 25000003 PHARM REV CODE 250

## 2024-07-19 PROCEDURE — 63600175 PHARM REV CODE 636 W HCPCS: Mod: JZ,JG | Performed by: STUDENT IN AN ORGANIZED HEALTH CARE EDUCATION/TRAINING PROGRAM

## 2024-07-19 PROCEDURE — 0PSJ04Z REPOSITION LEFT RADIUS WITH INTERNAL FIXATION DEVICE, OPEN APPROACH: ICD-10-PCS | Performed by: ORTHOPAEDIC SURGERY

## 2024-07-19 PROCEDURE — 37000009 HC ANESTHESIA EA ADD 15 MINS: Performed by: ORTHOPAEDIC SURGERY

## 2024-07-19 PROCEDURE — 25609 OPTX DST RD XART FX/EP SEP3+: CPT | Mod: LT,,, | Performed by: ORTHOPAEDIC SURGERY

## 2024-07-19 PROCEDURE — 80053 COMPREHEN METABOLIC PANEL: CPT | Performed by: HOSPITALIST

## 2024-07-19 PROCEDURE — 94761 N-INVAS EAR/PLS OXIMETRY MLT: CPT

## 2024-07-19 PROCEDURE — D9220A PRA ANESTHESIA: Mod: CRNA,,, | Performed by: NURSE ANESTHETIST, CERTIFIED REGISTERED

## 2024-07-19 PROCEDURE — 63600175 PHARM REV CODE 636 W HCPCS: Performed by: ORTHOPAEDIC SURGERY

## 2024-07-19 PROCEDURE — 81001 URINALYSIS AUTO W/SCOPE: CPT

## 2024-07-19 PROCEDURE — 71000016 HC POSTOP RECOV ADDL HR: Performed by: ORTHOPAEDIC SURGERY

## 2024-07-19 PROCEDURE — C1769 GUIDE WIRE: HCPCS | Performed by: ORTHOPAEDIC SURGERY

## 2024-07-19 PROCEDURE — 27000221 HC OXYGEN, UP TO 24 HOURS

## 2024-07-19 PROCEDURE — 71000033 HC RECOVERY, INTIAL HOUR: Performed by: ORTHOPAEDIC SURGERY

## 2024-07-19 PROCEDURE — C1713 ANCHOR/SCREW BN/BN,TIS/BN: HCPCS | Performed by: ORTHOPAEDIC SURGERY

## 2024-07-19 PROCEDURE — 27201423 OPTIME MED/SURG SUP & DEVICES STERILE SUPPLY: Performed by: ORTHOPAEDIC SURGERY

## 2024-07-19 PROCEDURE — 85025 COMPLETE CBC W/AUTO DIFF WBC: CPT | Performed by: HOSPITALIST

## 2024-07-19 PROCEDURE — 63600175 PHARM REV CODE 636 W HCPCS

## 2024-07-19 PROCEDURE — D9220A PRA ANESTHESIA: Mod: ANES,,, | Performed by: SURGERY

## 2024-07-19 PROCEDURE — 84100 ASSAY OF PHOSPHORUS: CPT | Performed by: HOSPITALIST

## 2024-07-19 PROCEDURE — 25000003 PHARM REV CODE 250: Performed by: NURSE ANESTHETIST, CERTIFIED REGISTERED

## 2024-07-19 PROCEDURE — 36000710: Performed by: ORTHOPAEDIC SURGERY

## 2024-07-19 PROCEDURE — 83735 ASSAY OF MAGNESIUM: CPT | Performed by: HOSPITALIST

## 2024-07-19 PROCEDURE — 36000711: Performed by: ORTHOPAEDIC SURGERY

## 2024-07-19 DEVICE — PLATE RAD DIST VA-LCP 2.4MM: Type: IMPLANTABLE DEVICE | Site: RADIUS | Status: FUNCTIONAL

## 2024-07-19 DEVICE — SCREW LOCKING 2.4 X 14MM: Type: IMPLANTABLE DEVICE | Site: RADIUS | Status: FUNCTIONAL

## 2024-07-19 DEVICE — SCREW LOCKING 2.4 X 18MM: Type: IMPLANTABLE DEVICE | Site: RADIUS | Status: FUNCTIONAL

## 2024-07-19 DEVICE — SCREW STRDRV REC T8 2.7X14 SS: Type: IMPLANTABLE DEVICE | Site: RADIUS | Status: FUNCTIONAL

## 2024-07-19 DEVICE — SCREW LOCKING 2.4 X 20MM: Type: IMPLANTABLE DEVICE | Site: RADIUS | Status: FUNCTIONAL

## 2024-07-19 DEVICE — SCREW CORTEX 2.7 X 16MM: Type: IMPLANTABLE DEVICE | Site: RADIUS | Status: FUNCTIONAL

## 2024-07-19 DEVICE — SCREW LOCKING 2.4 X 16MM: Type: IMPLANTABLE DEVICE | Site: RADIUS | Status: FUNCTIONAL

## 2024-07-19 RX ORDER — ACETAMINOPHEN 325 MG/1
650 TABLET ORAL EVERY 4 HOURS PRN
Status: DISCONTINUED | OUTPATIENT
Start: 2024-07-19 | End: 2024-07-23 | Stop reason: HOSPADM

## 2024-07-19 RX ORDER — LANOLIN ALCOHOL/MO/W.PET/CERES
1000 CREAM (GRAM) TOPICAL DAILY
Status: DISCONTINUED | OUTPATIENT
Start: 2024-07-19 | End: 2024-07-23 | Stop reason: HOSPADM

## 2024-07-19 RX ORDER — INSULIN ASPART 100 [IU]/ML
1-15 INJECTION, SOLUTION INTRAVENOUS; SUBCUTANEOUS
Status: DISCONTINUED | OUTPATIENT
Start: 2024-07-19 | End: 2024-07-19

## 2024-07-19 RX ORDER — PHENYLEPHRINE HYDROCHLORIDE 10 MG/ML
INJECTION INTRAVENOUS
Status: DISCONTINUED | OUTPATIENT
Start: 2024-07-19 | End: 2024-07-19

## 2024-07-19 RX ORDER — VASOPRESSIN 20 [USP'U]/ML
INJECTION, SOLUTION INTRAMUSCULAR; SUBCUTANEOUS
Status: DISCONTINUED | OUTPATIENT
Start: 2024-07-19 | End: 2024-07-19

## 2024-07-19 RX ORDER — TALC
6 POWDER (GRAM) TOPICAL NIGHTLY PRN
Status: DISCONTINUED | OUTPATIENT
Start: 2024-07-19 | End: 2024-07-23 | Stop reason: HOSPADM

## 2024-07-19 RX ORDER — GABAPENTIN 300 MG/1
600 CAPSULE ORAL DAILY
Status: DISCONTINUED | OUTPATIENT
Start: 2024-07-19 | End: 2024-07-23 | Stop reason: HOSPADM

## 2024-07-19 RX ORDER — PROPOFOL 10 MG/ML
VIAL (ML) INTRAVENOUS
Status: DISCONTINUED | OUTPATIENT
Start: 2024-07-19 | End: 2024-07-19

## 2024-07-19 RX ORDER — DEXMEDETOMIDINE HYDROCHLORIDE 100 UG/ML
INJECTION, SOLUTION INTRAVENOUS
Status: DISCONTINUED | OUTPATIENT
Start: 2024-07-19 | End: 2024-07-19

## 2024-07-19 RX ORDER — FENTANYL CITRATE 50 UG/ML
25 INJECTION, SOLUTION INTRAMUSCULAR; INTRAVENOUS EVERY 5 MIN PRN
Status: DISCONTINUED | OUTPATIENT
Start: 2024-07-19 | End: 2024-07-19 | Stop reason: HOSPADM

## 2024-07-19 RX ORDER — GLUCAGON 1 MG
1 KIT INJECTION
Status: DISCONTINUED | OUTPATIENT
Start: 2024-07-19 | End: 2024-07-19

## 2024-07-19 RX ORDER — SODIUM CHLORIDE 0.9 % (FLUSH) 0.9 %
10 SYRINGE (ML) INJECTION EVERY 6 HOURS PRN
Status: DISCONTINUED | OUTPATIENT
Start: 2024-07-19 | End: 2024-07-23 | Stop reason: HOSPADM

## 2024-07-19 RX ORDER — NORTRIPTYLINE HYDROCHLORIDE 25 MG/1
50 CAPSULE ORAL NIGHTLY
Status: DISCONTINUED | OUTPATIENT
Start: 2024-07-19 | End: 2024-07-23 | Stop reason: HOSPADM

## 2024-07-19 RX ORDER — ATORVASTATIN CALCIUM 40 MG/1
40 TABLET, FILM COATED ORAL DAILY
Status: DISCONTINUED | OUTPATIENT
Start: 2024-07-19 | End: 2024-07-23 | Stop reason: HOSPADM

## 2024-07-19 RX ORDER — IBUPROFEN 200 MG
16 TABLET ORAL
Status: DISCONTINUED | OUTPATIENT
Start: 2024-07-19 | End: 2024-07-19

## 2024-07-19 RX ORDER — FENTANYL CITRATE 50 UG/ML
25-200 INJECTION, SOLUTION INTRAMUSCULAR; INTRAVENOUS
Status: DISCONTINUED | OUTPATIENT
Start: 2024-07-19 | End: 2024-07-19 | Stop reason: HOSPADM

## 2024-07-19 RX ORDER — CHOLECALCIFEROL (VITAMIN D3) 25 MCG
1000 TABLET ORAL 2 TIMES DAILY
Status: DISCONTINUED | OUTPATIENT
Start: 2024-07-19 | End: 2024-07-23 | Stop reason: HOSPADM

## 2024-07-19 RX ORDER — ENOXAPARIN SODIUM 100 MG/ML
40 INJECTION SUBCUTANEOUS EVERY 24 HOURS
Status: DISCONTINUED | OUTPATIENT
Start: 2024-07-19 | End: 2024-07-19

## 2024-07-19 RX ORDER — MIDAZOLAM HYDROCHLORIDE 1 MG/ML
.5-4 INJECTION, SOLUTION INTRAMUSCULAR; INTRAVENOUS
Status: DISCONTINUED | OUTPATIENT
Start: 2024-07-19 | End: 2024-07-19 | Stop reason: HOSPADM

## 2024-07-19 RX ORDER — GLUCAGON 1 MG
1 KIT INJECTION
Status: DISCONTINUED | OUTPATIENT
Start: 2024-07-19 | End: 2024-07-23 | Stop reason: HOSPADM

## 2024-07-19 RX ORDER — NALOXONE HCL 0.4 MG/ML
0.02 VIAL (ML) INJECTION
Status: DISCONTINUED | OUTPATIENT
Start: 2024-07-19 | End: 2024-07-23 | Stop reason: HOSPADM

## 2024-07-19 RX ORDER — INSULIN ASPART 100 [IU]/ML
0-10 INJECTION, SOLUTION INTRAVENOUS; SUBCUTANEOUS
Status: DISCONTINUED | OUTPATIENT
Start: 2024-07-19 | End: 2024-07-19

## 2024-07-19 RX ORDER — HYDROMORPHONE HYDROCHLORIDE 1 MG/ML
0.2 INJECTION, SOLUTION INTRAMUSCULAR; INTRAVENOUS; SUBCUTANEOUS EVERY 5 MIN PRN
Status: DISCONTINUED | OUTPATIENT
Start: 2024-07-19 | End: 2024-07-19 | Stop reason: HOSPADM

## 2024-07-19 RX ORDER — LIDOCAINE HYDROCHLORIDE 20 MG/ML
INJECTION INTRAVENOUS
Status: DISCONTINUED | OUTPATIENT
Start: 2024-07-19 | End: 2024-07-19

## 2024-07-19 RX ORDER — EMTRICITABINE AND TENOFOVIR DISOPROXIL FUMARATE 200; 300 MG/1; MG/1
1 TABLET, FILM COATED ORAL DAILY
Status: DISCONTINUED | OUTPATIENT
Start: 2024-07-19 | End: 2024-07-23 | Stop reason: HOSPADM

## 2024-07-19 RX ORDER — POLYETHYLENE GLYCOL 3350 17 G/17G
17 POWDER, FOR SOLUTION ORAL 2 TIMES DAILY PRN
Status: DISCONTINUED | OUTPATIENT
Start: 2024-07-19 | End: 2024-07-19

## 2024-07-19 RX ORDER — EPHEDRINE SULFATE 50 MG/ML
INJECTION, SOLUTION INTRAVENOUS
Status: DISCONTINUED | OUTPATIENT
Start: 2024-07-19 | End: 2024-07-19

## 2024-07-19 RX ORDER — ONDANSETRON HYDROCHLORIDE 2 MG/ML
4 INJECTION, SOLUTION INTRAVENOUS EVERY 8 HOURS PRN
Status: DISCONTINUED | OUTPATIENT
Start: 2024-07-19 | End: 2024-07-23 | Stop reason: HOSPADM

## 2024-07-19 RX ORDER — IBUPROFEN 200 MG
24 TABLET ORAL
Status: DISCONTINUED | OUTPATIENT
Start: 2024-07-19 | End: 2024-07-19

## 2024-07-19 RX ORDER — POLYETHYLENE GLYCOL 3350 17 G/17G
17 POWDER, FOR SOLUTION ORAL DAILY
Status: DISCONTINUED | OUTPATIENT
Start: 2024-07-19 | End: 2024-07-23 | Stop reason: HOSPADM

## 2024-07-19 RX ORDER — DULOXETIN HYDROCHLORIDE 60 MG/1
60 CAPSULE, DELAYED RELEASE ORAL DAILY
Status: DISCONTINUED | OUTPATIENT
Start: 2024-07-19 | End: 2024-07-23 | Stop reason: HOSPADM

## 2024-07-19 RX ORDER — CEFAZOLIN 2 G/1
INJECTION, POWDER, FOR SOLUTION INTRAMUSCULAR; INTRAVENOUS
Status: DISCONTINUED | OUTPATIENT
Start: 2024-07-19 | End: 2024-07-19

## 2024-07-19 RX ORDER — GABAPENTIN 400 MG/1
1200 CAPSULE ORAL NIGHTLY
Status: DISCONTINUED | OUTPATIENT
Start: 2024-07-19 | End: 2024-07-23 | Stop reason: HOSPADM

## 2024-07-19 RX ORDER — SODIUM CHLORIDE 9 MG/ML
INJECTION, SOLUTION INTRAVENOUS CONTINUOUS
Status: ACTIVE | OUTPATIENT
Start: 2024-07-19 | End: 2024-07-20

## 2024-07-19 RX ORDER — ROCURONIUM BROMIDE 10 MG/ML
INJECTION, SOLUTION INTRAVENOUS
Status: DISCONTINUED | OUTPATIENT
Start: 2024-07-19 | End: 2024-07-19

## 2024-07-19 RX ORDER — TIZANIDINE 4 MG/1
8 TABLET ORAL NIGHTLY PRN
Status: DISCONTINUED | OUTPATIENT
Start: 2024-07-19 | End: 2024-07-19

## 2024-07-19 RX ORDER — HALOPERIDOL 5 MG/ML
0.5 INJECTION INTRAMUSCULAR EVERY 10 MIN PRN
Status: DISCONTINUED | OUTPATIENT
Start: 2024-07-19 | End: 2024-07-19 | Stop reason: HOSPADM

## 2024-07-19 RX ORDER — PROCHLORPERAZINE EDISYLATE 5 MG/ML
5 INJECTION INTRAMUSCULAR; INTRAVENOUS EVERY 6 HOURS PRN
Status: DISCONTINUED | OUTPATIENT
Start: 2024-07-19 | End: 2024-07-23 | Stop reason: HOSPADM

## 2024-07-19 RX ORDER — INSULIN LISPRO 100 [IU]/ML
50 INJECTION, SOLUTION INTRAVENOUS; SUBCUTANEOUS CONTINUOUS
Status: DISCONTINUED | OUTPATIENT
Start: 2024-07-19 | End: 2024-07-23 | Stop reason: HOSPADM

## 2024-07-19 RX ORDER — OMEGA-3/DHA/EPA/FISH OIL 300-1000MG
1 CAPSULE,DELAYED RELEASE (ENTERIC COATED) ORAL 2 TIMES DAILY
Status: DISCONTINUED | OUTPATIENT
Start: 2024-07-19 | End: 2024-07-23 | Stop reason: HOSPADM

## 2024-07-19 RX ORDER — SODIUM CHLORIDE 0.9 % (FLUSH) 0.9 %
10 SYRINGE (ML) INJECTION
Status: DISCONTINUED | OUTPATIENT
Start: 2024-07-19 | End: 2024-07-19 | Stop reason: HOSPADM

## 2024-07-19 RX ORDER — ONDANSETRON HYDROCHLORIDE 2 MG/ML
INJECTION, SOLUTION INTRAVENOUS
Status: DISCONTINUED | OUTPATIENT
Start: 2024-07-19 | End: 2024-07-19

## 2024-07-19 RX ORDER — ALUMINUM HYDROXIDE, MAGNESIUM HYDROXIDE, AND SIMETHICONE 1200; 120; 1200 MG/30ML; MG/30ML; MG/30ML
30 SUSPENSION ORAL 4 TIMES DAILY PRN
Status: DISCONTINUED | OUTPATIENT
Start: 2024-07-19 | End: 2024-07-23 | Stop reason: HOSPADM

## 2024-07-19 RX ORDER — FENTANYL CITRATE 50 UG/ML
INJECTION, SOLUTION INTRAMUSCULAR; INTRAVENOUS
Status: DISCONTINUED | OUTPATIENT
Start: 2024-07-19 | End: 2024-07-19

## 2024-07-19 RX ORDER — CLINDAMYCIN PHOSPHATE 900 MG/50ML
INJECTION, SOLUTION INTRAVENOUS
Status: DISCONTINUED | OUTPATIENT
Start: 2024-07-19 | End: 2024-07-19

## 2024-07-19 RX ORDER — AMOXICILLIN 250 MG
1 CAPSULE ORAL 2 TIMES DAILY
Status: DISCONTINUED | OUTPATIENT
Start: 2024-07-19 | End: 2024-07-23 | Stop reason: HOSPADM

## 2024-07-19 RX ORDER — METHOCARBAMOL 500 MG/1
500 TABLET, FILM COATED ORAL 4 TIMES DAILY PRN
Status: DISCONTINUED | OUTPATIENT
Start: 2024-07-19 | End: 2024-07-23 | Stop reason: HOSPADM

## 2024-07-19 RX ORDER — BUPIVACAINE HYDROCHLORIDE 2.5 MG/ML
INJECTION, SOLUTION EPIDURAL; INFILTRATION; INTRACAUDAL
Status: COMPLETED | OUTPATIENT
Start: 2024-07-19 | End: 2024-07-19

## 2024-07-19 RX ORDER — GLUCAGON 1 MG
1 KIT INJECTION
Status: DISCONTINUED | OUTPATIENT
Start: 2024-07-19 | End: 2024-07-19 | Stop reason: HOSPADM

## 2024-07-19 RX ORDER — IBUPROFEN 200 MG
16 TABLET ORAL
Status: DISCONTINUED | OUTPATIENT
Start: 2024-07-19 | End: 2024-07-23 | Stop reason: HOSPADM

## 2024-07-19 RX ORDER — MORPHINE SULFATE 15 MG/1
15 TABLET ORAL EVERY 4 HOURS PRN
Status: DISCONTINUED | OUTPATIENT
Start: 2024-07-19 | End: 2024-07-23 | Stop reason: HOSPADM

## 2024-07-19 RX ORDER — VANCOMYCIN HYDROCHLORIDE 1 G/20ML
INJECTION, POWDER, LYOPHILIZED, FOR SOLUTION INTRAVENOUS
Status: DISCONTINUED | OUTPATIENT
Start: 2024-07-19 | End: 2024-07-19 | Stop reason: HOSPADM

## 2024-07-19 RX ORDER — IBUPROFEN 200 MG
24 TABLET ORAL
Status: DISCONTINUED | OUTPATIENT
Start: 2024-07-19 | End: 2024-07-23 | Stop reason: HOSPADM

## 2024-07-19 RX ORDER — INSULIN LISPRO 100 [IU]/ML
1-20 INJECTION, SOLUTION INTRAVENOUS; SUBCUTANEOUS
Status: DISCONTINUED | OUTPATIENT
Start: 2024-07-19 | End: 2024-07-23 | Stop reason: HOSPADM

## 2024-07-19 RX ADMIN — LIDOCAINE HYDROCHLORIDE 100 MG: 20 INJECTION INTRAVENOUS at 07:07

## 2024-07-19 RX ADMIN — ASPIRIN 81 MG: 81 TABLET, COATED ORAL at 08:07

## 2024-07-19 RX ADMIN — PHENYLEPHRINE HYDROCHLORIDE 0.8 MCG/KG/MIN: 10 INJECTION INTRAVENOUS at 07:07

## 2024-07-19 RX ADMIN — NORTRIPTYLINE HYDROCHLORIDE 50 MG: 25 CAPSULE ORAL at 01:07

## 2024-07-19 RX ADMIN — ACETAMINOPHEN 650 MG: 325 TABLET ORAL at 09:07

## 2024-07-19 RX ADMIN — MORPHINE SULFATE 15 MG: 15 TABLET ORAL at 08:07

## 2024-07-19 RX ADMIN — GLYCOPYRROLATE 0.2 MG: 0.2 INJECTION, SOLUTION INTRAMUSCULAR; INTRAVENOUS at 08:07

## 2024-07-19 RX ADMIN — EPHEDRINE SULFATE 20 MG: 50 INJECTION INTRAVENOUS at 07:07

## 2024-07-19 RX ADMIN — INSULIN LISPRO 50 UNITS: 100 INJECTION, SOLUTION INTRAVENOUS; SUBCUTANEOUS at 06:07

## 2024-07-19 RX ADMIN — BUPIVACAINE HYDROCHLORIDE 30 ML: 2.5 INJECTION, SOLUTION EPIDURAL; INFILTRATION; INTRACAUDAL; PERINEURAL at 06:07

## 2024-07-19 RX ADMIN — FENTANYL CITRATE 50 MCG: 50 INJECTION INTRAMUSCULAR; INTRAVENOUS at 06:07

## 2024-07-19 RX ADMIN — DULOXETINE HYDROCHLORIDE 60 MG: 60 CAPSULE, DELAYED RELEASE ORAL at 12:07

## 2024-07-19 RX ADMIN — NORTRIPTYLINE HYDROCHLORIDE 50 MG: 25 CAPSULE ORAL at 08:07

## 2024-07-19 RX ADMIN — PHENYLEPHRINE HYDROCHLORIDE 300 MCG: 10 INJECTION INTRAVENOUS at 07:07

## 2024-07-19 RX ADMIN — SUGAMMADEX 200 MG: 100 INJECTION, SOLUTION INTRAVENOUS at 09:07

## 2024-07-19 RX ADMIN — EMTRICITABINE AND TENOFOVIR DISOPROXIL FUMARATE 1 TABLET: 200; 300 TABLET, FILM COATED ORAL at 12:07

## 2024-07-19 RX ADMIN — Medication 1 CAPSULE: at 01:07

## 2024-07-19 RX ADMIN — SODIUM CHLORIDE, SODIUM GLUCONATE, SODIUM ACETATE, POTASSIUM CHLORIDE, MAGNESIUM CHLORIDE, SODIUM PHOSPHATE, DIBASIC, AND POTASSIUM PHOSPHATE: .53; .5; .37; .037; .03; .012; .00082 INJECTION, SOLUTION INTRAVENOUS at 07:07

## 2024-07-19 RX ADMIN — CEFAZOLIN 2 G: 2 INJECTION, POWDER, FOR SOLUTION INTRAMUSCULAR; INTRAVENOUS at 04:07

## 2024-07-19 RX ADMIN — CELECOXIB 200 MG: 200 CAPSULE ORAL at 11:07

## 2024-07-19 RX ADMIN — Medication 200 MG: at 12:07

## 2024-07-19 RX ADMIN — VASOPRESSIN 0.8 UNITS: 20 INJECTION INTRAVENOUS at 07:07

## 2024-07-19 RX ADMIN — GABAPENTIN 1200 MG: 400 CAPSULE ORAL at 08:07

## 2024-07-19 RX ADMIN — CYANOCOBALAMIN TAB 1000 MCG 1000 MCG: 1000 TAB at 12:07

## 2024-07-19 RX ADMIN — VASOPRESSIN 0.4 UNITS: 20 INJECTION INTRAVENOUS at 08:07

## 2024-07-19 RX ADMIN — CEFAZOLIN 2 EACH: 2 INJECTION, POWDER, FOR SOLUTION INTRAMUSCULAR; INTRAVENOUS at 07:07

## 2024-07-19 RX ADMIN — EPHEDRINE SULFATE 15 MG: 50 INJECTION INTRAVENOUS at 07:07

## 2024-07-19 RX ADMIN — CLINDAMYCIN IN 5 PERCENT DEXTROSE 900 MG: 18 INJECTION, SOLUTION INTRAVENOUS at 07:07

## 2024-07-19 RX ADMIN — PROPOFOL 150 MG: 10 INJECTION, EMULSION INTRAVENOUS at 07:07

## 2024-07-19 RX ADMIN — SENNOSIDES AND DOCUSATE SODIUM 1 TABLET: 50; 8.6 TABLET ORAL at 01:07

## 2024-07-19 RX ADMIN — CHOLECALCIFEROL TAB 25 MCG (1000 UNIT) 1000 UNITS: 25 TAB at 11:07

## 2024-07-19 RX ADMIN — MIDAZOLAM 1 MG: 1 INJECTION INTRAMUSCULAR; INTRAVENOUS at 06:07

## 2024-07-19 RX ADMIN — SENNOSIDES AND DOCUSATE SODIUM 1 TABLET: 50; 8.6 TABLET ORAL at 11:07

## 2024-07-19 RX ADMIN — SENNOSIDES AND DOCUSATE SODIUM 1 TABLET: 50; 8.6 TABLET ORAL at 08:07

## 2024-07-19 RX ADMIN — SODIUM CHLORIDE: 9 INJECTION, SOLUTION INTRAVENOUS at 01:07

## 2024-07-19 RX ADMIN — CEFAZOLIN 2 G: 2 INJECTION, POWDER, FOR SOLUTION INTRAMUSCULAR; INTRAVENOUS at 01:07

## 2024-07-19 RX ADMIN — DEXMEDETOMIDINE 12 MCG: 100 INJECTION, SOLUTION, CONCENTRATE INTRAVENOUS at 07:07

## 2024-07-19 RX ADMIN — Medication 1 CAPSULE: at 12:07

## 2024-07-19 RX ADMIN — ROCURONIUM BROMIDE 50 MG: 10 INJECTION, SOLUTION INTRAVENOUS at 07:07

## 2024-07-19 RX ADMIN — PHENYLEPHRINE HYDROCHLORIDE 200 MCG: 10 INJECTION INTRAVENOUS at 07:07

## 2024-07-19 RX ADMIN — FENTANYL CITRATE 50 MCG: 50 INJECTION, SOLUTION INTRAMUSCULAR; INTRAVENOUS at 07:07

## 2024-07-19 RX ADMIN — SODIUM CHLORIDE: 0.9 INJECTION, SOLUTION INTRAVENOUS at 07:07

## 2024-07-19 RX ADMIN — ONDANSETRON 4 MG: 2 INJECTION INTRAMUSCULAR; INTRAVENOUS at 08:07

## 2024-07-19 RX ADMIN — Medication 6 MG: at 08:07

## 2024-07-19 RX ADMIN — ASPIRIN 81 MG: 81 TABLET, COATED ORAL at 11:07

## 2024-07-19 RX ADMIN — CHOLECALCIFEROL TAB 25 MCG (1000 UNIT) 1000 UNITS: 25 TAB at 08:07

## 2024-07-19 RX ADMIN — GABAPENTIN 600 MG: 300 CAPSULE ORAL at 11:07

## 2024-07-19 RX ADMIN — ATORVASTATIN CALCIUM 40 MG: 40 TABLET, FILM COATED ORAL at 11:07

## 2024-07-19 RX ADMIN — Medication 1 CAPSULE: at 08:07

## 2024-07-19 RX ADMIN — ACETAMINOPHEN 650 MG: 325 TABLET ORAL at 11:07

## 2024-07-19 RX ADMIN — METHOCARBAMOL 500 MG: 500 TABLET ORAL at 04:07

## 2024-07-19 RX ADMIN — MORPHINE SULFATE 15 MG: 15 TABLET ORAL at 04:07

## 2024-07-19 NOTE — PLAN OF CARE
Admitted overnight for right trimal fx, left distal radius fx after fall with presyncopal episode with dizziness reported before fall with hx of DM, HTN, gerd, HLD, chronic back pain with injections/pain management at baseline, who follows with PCP/endo/pain management long term. PCP notes reviewed from OSH. Has dexcom and insulin pump. Nursing in pacu reports insulin pump removed or OR as was in way of surgical site and endo consulted to see if can be replaced vs insulin regimen to replace pump while inpatient. A1c 10.7--> now 9.9, glucose higher 100s in pacu now. Patient went for ex fix to RLE last night and stayed in PACU overnight as no beds on floor and then went for ORIF of left radius this AM. Came to see in pacu post op and still very sleep with oropharyngeal airway in place and will try to see later tody once more awake with nursing at bedside. RLE elevated with ex fix and LUE splinted. Op note with synthes distal radius VA volar plate and scerws with dr velez today- ORIF of left intraarticular distal radius fx with recs for platform walker for gait with NWB to RLE, weightb eraing across left elbow and mininal gripping of post of walker. ROm of fingers of left hand okay. Fixation of ankle planned for next Friday if soft tissues allow. Multimodal pain meds on board, has morphine given has oxy intolerance. Bp lower end normal on admit and 90s in pacu and holding home bp meds. Cr 1.6, baseline 1.4, Na mildly low, vit D 40. UA wnl. TSH 6, free T4 normal.   Echo ordered and pendign.   Will place on light IVF given Na and slight higher than normal Cr on admit.  On bowel regimen, and other home meds on admit.  On ASA BID for ppx  DM diet started once more awake post op

## 2024-07-19 NOTE — ANESTHESIA PREPROCEDURE EVALUATION
[facility]  Anesthesia Pre-Operative Evaluation        Patient Name: Cortes Schroeder  YOB: 1960  MRN: [id number]    SUBJECTIVE:     Pre-operative Evaluation for Procedure(s) (LRB):  APPLICATION, EXTERNAL FIXATION DEVICE, FOR ANKLE FRACTURE - RIGHT, SYNTHES, C ARM DOOR SIDE (Right)     07/18/2024    Last ate at 7AM, and drank root beer at 2PM    Cortes Schroeder is a 64 y.o. male with a PMHx significant for anxiety, HLD, HTN, and T2DM who presents to the ED after a fall. Imaging shows right trimalleolar ankle fracture with skin tenting & non-displaced left distal radius fracture.      He now presents for the above procedure(s).    Previous Airway : None documented       LDA:        Peripheral IV - Single Lumen 07/18/24 1841 18 G Right Antecubital (Active)   Number of days: 0       Patient Active Problem List   Diagnosis    Hyperlipidemia    Anxiety    Chronic bilateral low back pain without sciatica    Neuropathy    Uncontrolled type 2 diabetes mellitus with diabetic polyneuropathy, with long-term current use of insulin    NPDR (nonproliferative diabetic retinopathy)    Insomnia    Gastroesophageal reflux disease without esophagitis    Hypertension, well controlled    Chronic pain syndrome    Diabetic nephropathy associated with type 2 diabetes mellitus    Right sided weakness    Cervical syndrome    Chronic neck pain    Muscle weakness    Impaired motor control    Decreased range of motion (ROM) of shoulder    Cervical spondylosis without myelopathy    Neuroforaminal stenosis of cervical spine    Right cervical radiculopathy    Cervical radiculopathy    DDD (degenerative disc disease), lumbar    Insulin pump in place    Morbid obesity    High risk homosexual behavior    Lumbar radiculopathy    Lumbar spondylosis    Left lumbar radiculitis    Closed trimalleolar fracture of right ankle       Review of patient's allergies indicates:   Allergen Reactions    Invokana [canagliflozin]  Anaphylaxis    Percocet [oxycodone-acetaminophen] Nausea Only and Hallucinations    Biaxin [clarithromycin]     Hydrocodone Other (See Comments)     Dizzy/nausea/hallucinations    Sulfa (sulfonamide antibiotics) Nausea Only and Rash       Current Outpatient Medications   Medication Instructions    blood-glucose sensor (DEXCOM G6 SENSOR) Omaira Change sensor every 10 days    blood-glucose transmitter (DEXCOM G6 TRANSMITTER) Omaira Change transmitter every 3 months    cinnamon bark (CINNAMON) 500 mg, Oral, 2 times daily    DULoxetine (CYMBALTA) 60 mg, Oral, Daily    emtricitabine-tenofovir 200-300 mg (TRUVADA) 200-300 mg Tab 1 tablet, Oral, Daily    fish oil-omega-3 fatty acids 300-1,000 mg capsule Oral, 2 times daily    gabapentin (NEURONTIN) 100 mg, Oral, Daily    gabapentin (NEURONTIN) 1,200 mg, Oral, Nightly    HUMALOG U-100 INSULIN 100 unit/mL injection Use in insulin pump; max dose 150 units per day.    insulin pump cartridge (OMNIPOD DASH 5 PACK POD) Crtg Apply new pod to skin every 1.5 days    insulin pump controller (OMNIPOD DASH PDM KIT MISC) Misc.(Non-Drug; Combo Route)    L-CITRULLINE MISC Misc.(Non-Drug; Combo Route), Daily    lancets Misc To check BG 4-7 times daily, to use with insurance preferred meter    losartan (COZAAR) 50 MG tablet TAKE 1 TABLET(50 MG) BY MOUTH EVERY DAY    magnesium oxide-Mg AA chelate (MG-PLUS-PROTEIN) 133 mg Tab 500 mg, Oral, Nightly    nortriptyline (PAMELOR) 50 mg, Oral, Nightly    OPW TEST CLAIM - DO NOT FILL Subcutaneous, OPW test claim. Do not fill.    rosuvastatin (CRESTOR) 5 MG tablet TAKE 1 TABLET(5 MG) BY MOUTH EVERY DAY    tiZANidine (ZANAFLEX) 8 mg, Oral, Nightly PRN    vitamin D (VITAMIN D3) 185 mg, Oral, 2 times daily       Past Surgical History:   Procedure Laterality Date    BACK SURGERY  2003    Lumbar Spine    CATARACT EXTRACTION      EPIDURAL STEROID INJECTION N/A 1/16/2019    Procedure: Injection, Steroid, Epidural Cervical;  Surgeon: Andrew Sinclair Jr., MD;   Location: [facility] ENDO;  Service: Pain Management;  Laterality: N/A;  Cervical Epidural Steroid Injection C7-T1    28257    Arrive @ 1240    EPIDURAL STEROID INJECTION N/A 2/20/2019    Procedure: Injection, Steroid, Epidural;  Surgeon: Andrew Sinclair Jr., MD;  Location: [facility] ENDO;  Service: Pain Management;  Laterality: N/A;  Lumbar Epidural Steroid Injection L4-5    41988    Arrive @ 1215 (requests latest time)    INJECTION, SPINE, LUMBOSACRAL, TRANSFORAMINAL APPROACH Bilateral 6/14/2024    Procedure: Bilateral L5 Transforaminal Epidural Steroid Injections;  Surgeon: Andrew Sinclair Jr., MD;  Location: [facility] PAIN MANAGEMENT;  Service: Pain Management;  Laterality: Bilateral;  @1300(given)  ASA last 6/8  Check BG  MD Sign.       Social History     Substance and Sexual Activity   Drug Use No     Alcohol Use: Not At Risk (2/23/2024)    AUDIT-C     Frequency of Alcohol Consumption: Monthly or less     Average Number of Drinks: 1 or 2     Frequency of Binge Drinking: Never     Tobacco Use: Low Risk  (7/1/2024)    Patient History     Smoking Tobacco Use: Never     Smokeless Tobacco Use: Never     Passive Exposure: Not on file       OBJECTIVE:     Vital Signs Range (Last 24H):  Temp:  [36.5 °C (97.7 °F)]   Pulse:  [74-79]   Resp:  [14-19]   BP: (110-134)/(57-67)   SpO2:  [97 %-99 %]       Significant Labs    Heme Profile  Lab Results   Component Value Date    WBC 13.75 (H) 07/18/2024    HGB 15.0 07/18/2024    HCT 42.4 07/18/2024     07/18/2024       Coagulation Studies  Lab Results   Component Value Date    LABPROT 10.9 07/18/2024    INR 1.0 07/18/2024       BMP  Lab Results   Component Value Date     (L) 07/18/2024    K 4.0 07/18/2024     07/18/2024    CO2 23 07/18/2024    BUN 35 (H) 07/18/2024    CREATININE 1.8 (H) 07/18/2024    MG 2.1 07/18/2024    PHOS 3.8 07/18/2024       Liver Function Tests  Lab Results   Component Value Date    AST 22 07/18/2024    ALT 38 07/18/2024    ALKPHOS 147 (H)  07/18/2024    BILITOT 0.4 07/18/2024    PROT 7.3 07/18/2024    ALBUMIN 3.6 07/18/2024       Lipid Profile  Lab Results   Component Value Date    CHOL 104 09/23/2021    HDL 38 (L) 09/23/2021    TRIG 203 (H) 09/23/2021       Endocrine Profile  Lab Results   Component Value Date    HGBA1C 9.9 (H) 07/18/2024    TSH 6.243 (H) 07/18/2024         Cardiac Studies    EKG:   Results for orders placed or performed during the hospital encounter of 10/30/16   EKG 12-lead (Reason:chest pain)    Collection Time: 10/30/16  3:17 PM    Narrative    Test Reason :   Blood Pressure : mmHG  Vent. Rate : 097 BPM     Atrial Rate : 097 BPM     P-R Int : 160 ms          QRS Dur : 076 ms      QT Int : 366 ms       P-R-T Axes : 052 021 083 degrees     QTc Int : 464 ms    Age and gender specific analysis  Normal sinus rhythm  Cannot rule out Anterior infarct ,age undetermined  Abnormal ECG  When compared with ECG of 10-FEB-2015 11:22,  Significant changes have occurred  Confirmed by Barbi Carlos MD ([id number]) on 11/1/2016 10:51:14 AM    Referred By: SELF REFERRAL           Confirmed By:Barbi Carlos MD         ASSESSMENT/PLAN:         Pre-op Assessment    I have reviewed the Patient Summary Reports.     I have reviewed the Nursing Notes. I have reviewed the NPO Status.   I have reviewed the Medications.     Review of Systems  Anesthesia Hx:  No problems with previous Anesthesia               Denies Personal Hx of Anesthesia complications.                    Cardiovascular:     Hypertension           hyperlipidemia                             Renal/:  Chronic Renal Disease                Hepatic/GI:     GERD             Musculoskeletal:  Arthritis          Spine Disorders: cervical            Endocrine:  Diabetes, type 2         Obesity / BMI > 30  Psych:   anxiety                 Physical Exam  General: Well nourished, Cooperative and Alert    Airway:  Mallampati: III   Mouth Opening: Small, but > 3cm  TM Distance: Normal  Tongue: Normal  Neck  ROM: Normal ROM, Flexion Decreased    Dental:  Periodontal disease        Anesthesia Plan  Type of Anesthesia, risks & benefits discussed:    Anesthesia Type: Gen ETT, Regional  Intra-op Monitoring Plan: Standard ASA Monitors  Post Op Pain Control Plan: multimodal analgesia and IV/PO Opioids PRN  Induction:  IV  Airway Plan: Direct, Post-Induction  Informed Consent: Informed consent signed with the Patient and all parties understand the risks and agree with anesthesia plan.  All questions answered.   ASA Score: 3  Day of Surgery Review of History & Physical: H&P Update referred to the surgeon/provider.    Ready For Surgery From Anesthesia Perspective.     .

## 2024-07-19 NOTE — PROGRESS NOTES
Patient transport from PACU 17 to [facility] with [facility] Rns x2. Patient belongings with patient.

## 2024-07-19 NOTE — NURSING TRANSFER
Nursing Transfer Note      Reason patient is being transferred: post op    Transfer To: 541    Transfer via bed    Transfer with IV pump, cardiac monitoring via centralized telemetry, DexCom (in place), 2L O2 per NC, left arm sling+sleeve and all related accessories, personal belongings (wallet, phone, keys, clothing - in clear patient belongings bag at time of transfer)    Transported by PCT    Medicines sent: none    Any special needs or follow-up needed: receiving nurse Alesia to obtain accucheck and cover patient with sliding scale insulin as needed upon arrival to the floor (spoke with Meg endocrinology NP, who will d/c insulin infusion orders, as patient's family will be arriving soon to reconnect patient to OmniPod home insulin pump at usual settings)    Chart send with patient: Yes    Notified: support person at bedside for 1400 visit    Patient reassessed at: 7/19/2024 2:45 PM

## 2024-07-19 NOTE — OP NOTE
OP NOTE    DOS:  07/19/2024    Preop Dx: Left intra-articular distal radius fracture    Postop Dx: Left intra-articular distal radius fracture    Procedure: Open reduction internal fixation left intra-articular distal radius fracture, 3 or more parts - 50021    Surgeon: Moe Liz M.D.    Asst:  Andreas Castaneda M.D    Anesthesia: GETA    EBL:  Minimal    IVF:  200 cc crystalloid    Implants: Synthes distal radius VA volar plate and screws    Specimens: None    Findings: Secure fixation.  Good bone quality.    Dispo:  To PACU extubated/stable       Indications for Procedure:      64-year-old male had a ground level fall sustaining a right trimalleolar ankle fracture dislocation which is now status post external fixation as well as a left intra-articular distal radius fracture.  I am not taking him to the operating room for definitive fixation of the left distal radius.  The risks, benefits and alternatives to surgery discussed with the patient prior to going the operating room.  Informed consent was obtained.    Procedure in Detail:    Patient was identified the preoperative holding area and the site was marked.  Regional analgesia was administered.  Patient was wheeled to the operating room placed on the operating table in a supine position.  General endotracheal anesthesia was induced and preoperative antibiotics were administered to include Ancef and clindamycin.  The left upper extremity was placed into a nonsterile tourniquet and then prepped and draped in sterile fashion.  A time-out was undertaken to confirm patient, side, site, surgery, surgeon and administration of preoperative antibiotics to include Ancef and clindamycin.  All agreed and we proceeded.  The arm was exsanguinated and the tourniquet was raised.    I made a standard flexor carpi radialis approach to the distal radius.  I incised the pronator quadratus in an L shape off of the bone.  The volar spike came down to the ulnar side of the shaft.   There was not much shortening.  At this point I selected a 3 hole plate and placed this in the appropriate position and held it with a K-wire through a locking tower.  The major split was in the coronal plane and then the sagittal split in the dorsal ulnar fragment was not significantly displaced.  I placed a bicortical screw in the most ulnar hole in the plate to grab the dorsal ulnar fragment and to compress the coronal split.  I then placed a cortical screw in the shaft in the oblong hole to buttress/antiglide the proximally traveling fracture line on the distal shaft.  I then placed another cortical screw in the proximal hole.  I temporarily placed a cortical screw in the V of the end of the plate to further compress and hold in good position.    I placed locking screws in the next several radial holes distally and then in the proximal hole on the ulnar side.  I then removed the bicortical screw and replaced this with a unicortical locking screw.  The joint surface maintain a very good position at this point.  We had good restoration of radial height, inclination and volar tilt.  I then placed the 2 radial styloid screws in good position.  I removed the screw in the top be which was not really a hole in the plate as it had done its job.  I then placed a locking screw where the K-wire had been.    Final images were taken including true AP, 20 degree lateral and dorsal tangential view showing all screws to be contained within the bone and not piercing the dorsal cortex.  The tourniquet was let down hemostasis obtained with electrocautery.  The wound was copiously irrigated with normal saline solution.  The pronator quadratus was repaired with 3-0 Vicryl suture.  The subcutaneous tissue was repaired with 3-0 Vicryl suture over vancomycin cefepime powder and the skin with 3-0 Monocryl suture and Dermabond.  A sterile silver lined dressing was applied followed by a risk compression sleeve and a Velcro wrist splint.       All instrument and sponge counts were reported correct at the end of the case.  There were no complications.  The patient was extubated, awakened and taken to the recovery room stable condition.      Plan the patient:     Ice and elevation of the right ankle in the external fixator.  He will need training with a platform walker for gait with weight-bearing across the left elbow and minimal gripping of the post.  Range of motion of the fingers on the left hand.  Multimodal pain management limiting narcotics.  Tight blood glucose control.  Tentative fixation of the ankle in 1 week if soft tissues amenable.        Moe Liz MD

## 2024-07-19 NOTE — CONSULTS
[facility] - Surgery ([facility])  Endocrinology  Diabetes Consult Note    Consult Requested by: Maria Del Rosario Medina MD   Reason for admit: Closed trimalleolar fracture of right ankle    HISTORY OF PRESENT ILLNESS:  Reason for Consult: Management of T2DM, Hyperglycemia     Surgical Procedure and Date:  7/19/24   ORIF, FRACTURE, RADIUS, DISTAL - LEFT (Left)      Diabetes diagnosis year: 1995    Home Diabetes Medications:    Humalog via OmniPod insulin pump  Mounjaro 10 mg weekly     Current pump settings:  Basal 12 am to 2.3 and 6 am to 1.55  ICR to 3 at 12 am, 2 at 4 pm  ISF to 1:20   AIT 3 hrs  Target 110-120       Patient had anaphylaxis reaction to SGLT2 inhibitors specifically Invokana        Lab Results   Component Value Date    LABA1C 10.8 (H) 08/15/2016    HGBA1C 9.9 (H) 07/18/2024       How often checking glucose at home? Dexcom G6   BG readings on regimen: variable 40s-300s (mostly on high side)   Hypoglycemia on the regimen?  Yes  Missed doses on regimen?  No    Diabetes Complications include:     Hyperglycemia and Diabetic peripheral neuropathy       Complicating diabetes co morbidities:   HLD, HTN, Obesity       HPI:   Patient is a 64 y.o. male with a diagnosis of anxiety, HLD, HTN, and T2DM who presents to the ED after a fall. Imaging shows right trimalleolar ankle fracture with skin tenting & non-displaced left distal radius fracture. He now presents for the above procedure(s). Patient sees Pepe Meade DO for insulin pump management. Last seen on 6/24. Endocrinology consulted for management of T2DM.       Interval HPI:   Overnight events: Remains in PACU at time of consult. Insulin pump taken off prior to surgery and remains off. BG above goal ranges at this time (264). Diet diabetic 2000 Calorie    Eating:    nothing yet since surgery   Nausea: No  Hypoglycemia and intervention: No  Fever: No  TPN and/or TF: No  If yes, type of TF/TPN and rate: n/a    PMH, PSH, FH, SH reviewed     ROS:  Constitutional:  Negative for weight changes.  Eyes: Negative for visual disturbance.  Respiratory: Negative for cough.   Cardiovascular: Negative for chest pain.  Gastrointestinal: Negative for nausea.  Endocrine: Negative for polyuria, polydipsia.  Musculoskeletal: Negative for back pain.  Skin: Negative for rash.  Neurological: Negative for syncope.  Psychiatric/Behavioral: Negative for depression.      Review of Systems    Current Medications and/or Treatments Impacting Glycemic Control  Immunotherapy:    Immunosuppressants       None          Steroids:   Hormones (From admission, onward)      Start     Stop Route Frequency Ordered    07/19/24 0145  melatonin tablet 6 mg         -- Oral Nightly PRN 07/19/24 0048          Pressors:    Autonomic Drugs (From admission, onward)      None          Hyperglycemia/Diabetes Medications:   Antihyperglycemics (From admission, onward)      Start     Stop Route Frequency Ordered    07/19/24 1645  insulin aspart U-100 pen 1-15 Units         -- SubQ 3 times daily with meals 07/19/24 1443    07/19/24 1545  insulin regular in 0.9 % NaCl 100 unit/100 mL (1 unit/mL) infusion        Question:  Enter initial dose (Units/hr):  Answer:  1.5    -- IV Continuous 07/19/24 1443    07/19/24 1543  insulin aspart U-100 pen 0-10 Units         -- SubQ Before meals, nightly and at 0200 PRN 07/19/24 1443             PHYSICAL EXAMINATION:  Vitals:    07/19/24 1430   BP: (!) 134/58   Pulse: 82   Resp: 11   Temp:      Body mass index is 33.78 kg/m².     Physical Exam   Constitutional: Well developed, well nourished, obese, NAD.  ENT: External ears no masses with nose patent; normal hearing.  Neck: Supple; trachea midline.  Cardiovascular: Normal heart sounds, no LE edema. DP +2 bilaterally.  Lungs: Normal effort; lungs anterior bilaterally clear to auscultation.  Abdomen: Soft, no masses, no hernias.  MS: No clubbing or cyanosis of nails noted; unable to assess gait.  Skin: No rashes, lesions, or ulcers; no  nodules.   Psychiatric: Good judgement and insight; normal mood and affect.  Neurological: Cranial nerves are grossly intact.   Foot: Nails in good condition, no amputations noted.      Labs Reviewed and Include   Recent Labs   Lab 07/19/24  0230   *   CALCIUM 8.7   ALBUMIN 3.0*   PROT 6.3   *   K 4.5   CO2 24   CL 98   BUN 30*   CREATININE 1.6*   ALKPHOS 122   ALT 31   AST 18   BILITOT 0.3     Lab Results   Component Value Date    WBC 11.17 07/19/2024    HGB 13.8 (L) 07/19/2024    HCT 39.9 (L) 07/19/2024    MCV 86 07/19/2024     07/19/2024     Recent Labs   Lab 07/18/24  1617   TSH 6.243*   FREET4 0.98     Lab Results   Component Value Date    HGBA1C 9.9 (H) 07/18/2024       Nutritional status:   Body mass index is 33.78 kg/m².  Lab Results   Component Value Date    ALBUMIN 3.0 (L) 07/19/2024    ALBUMIN 3.6 07/18/2024    ALBUMIN 4.2 10/21/2022     Lab Results   Component Value Date    PREALBUMIN 13 (L) 07/18/2024       Estimated Creatinine Clearance: 49.3 mL/min (A) (based on SCr of 1.6 mg/dL (H)).    Accu-Checks  Recent Labs     07/18/24  2141 07/19/24  0212 07/19/24  0553 07/19/24  0925 07/19/24  1431   POCTGLUCOSE 190* 252* 224* 171* 264*        ASSESSMENT and PLAN    Endocrine  Morbid obesity  Body mass index is 33.78 kg/m².  May increase insulin resistance.         Uncontrolled type 2 diabetes mellitus with hyperglycemia, with long-term current use of insulin  BG goal 140-180    Insulin pump removed prior to OR. Remains in PACU at this time w/ BG above goal ranges (264). Patient does not have more insulin pump supplies with him to replace at this time.     Plan:   -Start Transition IV insulin infusion at 1.5 u/hr with stepdown parameters (lower rate of basal settings on pump)  -Start Novolog ICR 1:3 (1 unit per 3g of carbs) TID with meals (based on pump ICR)  -Start Moderate Dose Correction Scale  -BG monitoring ac/hs/0200    ** Please call Endocrine for any BG related issues **    Will  plan to resume insulin pump once patient has supplies delivered to bedside       ADDENDUM:   RN notified endocrine that patient's family will be bringing insulin pump to bedside shortly     Patient to resume pump with home settings  BG monitoring /hs/0200   Notify ENDOCRINE if patient unable to start home pump for any reason or in the event of pump failure    Orthopedic  * Closed trimalleolar fracture of right ankle  Managed per primary team  Optimize BG control            Plan discussed with patient, family, and RN at bedside.     Meg Penaloza NP  Endocrinology  [facility] - Surgery (2nd Fl)

## 2024-07-19 NOTE — NURSING
Nurses Note -- 4 Eyes      7/19/2024   6:35 PM      Skin assessed during: Admit      [x] No Altered Skin Integrity Present    []Prevention Measures Documented      [] Yes- Altered Skin Integrity Present or Discovered   [] LDA Added if Not in Epic (Describe Wound)   [] New Altered Skin Integrity was Present on Admit and Documented in LDA   [] Wound Image Taken    Wound Care Consulted? No    Attending Nurse:  Alesia NAZARIO RN    Second RN/Staff Member:   NIEVES Cox

## 2024-07-19 NOTE — HPI
Reason for Consult: Management of T2DM, Hyperglycemia     Surgical Procedure and Date:  7/19/24   ORIF, FRACTURE, RADIUS, DISTAL - LEFT (Left)      Diabetes diagnosis year: 1995    Home Diabetes Medications:    Humalog via OmniPod insulin pump  Mounjaro 10 mg weekly     Current pump settings:  Basal 12 am to 2.3 and 6 am to 1.55  ICR to 3 at 12 am, 2 at 4 pm  ISF to 1:20   AIT 3 hrs  Target 110-120       Patient had anaphylaxis reaction to SGLT2 inhibitors specifically Invokana        Lab Results   Component Value Date    LABA1C 10.8 (H) 08/15/2016    HGBA1C 9.9 (H) 07/18/2024       How often checking glucose at home? Dexcom G6   BG readings on regimen: variable 40s-300s (mostly on high side)   Hypoglycemia on the regimen?  Yes  Missed doses on regimen?  No    Diabetes Complications include:     Hyperglycemia and Diabetic peripheral neuropathy       Complicating diabetes co morbidities:   HLD, HTN, Obesity       HPI:   Patient is a 64 y.o. male with a diagnosis of anxiety, HLD, HTN, and T2DM who presents to the ED after a fall. Imaging shows right trimalleolar ankle fracture with skin tenting & non-displaced left distal radius fracture. He now presents for the above procedure(s). Patient sees Pepe Meade DO for insulin pump management. Last seen on 6/24. Endocrinology consulted for management of T2DM.

## 2024-07-19 NOTE — ANESTHESIA PROCEDURE NOTES
Intubation    Date/Time: 7/18/2024 8:15 PM    Performed by: Lise Gallagher CRNA  Authorized by: Danii Tai MD    Intubation:     Induction:  Intravenous    Intubated:  Postinduction    Mask Ventilation:  Easy with oral airway    Attempts:  1    Attempted By:  CRNA    Method of Intubation:  Video laryngoscopy    Blade:  Bravo 3    Laryngeal View Grade: Grade I - full view of cords      Difficult Airway Encountered?: No      Complications:  None    Airway Device:  Oral endotracheal tube    Airway Device Size:  7.5    Style/Cuff Inflation:  Cuffed (inflated to minimal occlusive pressure)    Tube secured:  24    Secured at:  The lips    Placement Verified By:  Capnometry    Complicating Factors:  None    Findings Post-Intubation:  BS equal bilateral and atraumatic/condition of teeth unchanged

## 2024-07-19 NOTE — ANESTHESIA POSTPROCEDURE EVALUATION
Anesthesia Post Evaluation    Patient: Cortes Schroeder    Procedure(s) Performed: Procedure(s) (LRB):  ORIF, FRACTURE, RADIUS, DISTAL - LEFT (Left)    Final Anesthesia Type: general      Patient location during evaluation: PACU  Patient participation: Yes- Able to Participate  Level of consciousness: awake and alert  Post-procedure vital signs: reviewed and stable  Pain management: adequate  Airway patency: patent  NARINDER mitigation strategies: Multimodal analgesia, Preoperative use of mandibular advancement devices or oral appliances, Intraoperative administration of CPAP, nasopharyngeal airway, or oral appliance during sedation, Verification of full reversal of neuromuscular block and Extubation while patient is awake  PONV status at discharge: No PONV  Anesthetic complications: no      Cardiovascular status: blood pressure returned to baseline, hemodynamically stable and stable  Respiratory status: unassisted and spontaneous ventilation  Hydration status: euvolemic  Follow-up not needed.              Vitals Value Taken Time   /59 07/19/24 1416   Temp 36.1 °C (97 °F) 07/19/24 1130   Pulse 83 07/19/24 1426   Resp 11 07/19/24 1426   SpO2 99 % 07/19/24 1426   Vitals shown include unfiled device data.      Event Time   Out of Recovery 09:45:00         Pain/Judy Score: Pain Rating Prior to Med Admin: 0 (7/19/2024 11:02 AM)  Pain Rating Post Med Admin: 0 (7/19/2024  6:55 AM)  Judy Score: 9 (7/19/2024  9:45 AM)

## 2024-07-19 NOTE — OP NOTE
OP NOTE    DOS:  07/18/2024    Preop Dx: Right trimalleolar ankle fracture dislocation    Postop Dx: Right trimalleolar ankle fracture dislocation    Procedure: Spanning external fixation right trimalleolar ankle fracture    Closed reduction right ankle fracture dislocation under anesthesia    Surgeon: Moe Liz M.D.    Asst:  Tawnya Garcia M.D, Donovan Isabel MD, Andreas Castaneda MD    Anesthesia: GETA    EBL:  Minimal    IVF:  500 cc crystalloid    Implants: Synthes large ex fix    Specimens: None    Findings: Stable ex fix.  Stable alignment.    Dispo:  To PACU extubated/stable       Indications for Procedure:      64-year-old male sustained ground level fall resulting in left intra-articular distal radius fracture and severely displaced right trimalleolar ankle fracture dislocation.  Closed reduction performed in the ER and it remained unstable.  I am taking him to the operating room for spanning external fixation pending staged definitive fixation when soft tissues are amenable.  The risks, benefits and alternatives to surgery were discussed the patient prior to going to the operating room.  Informed consent was obtained.    Procedure in Detail:    Patient was identified the preoperative holding area the site was marked.  Patient was wheeled to the operating room placed on the operating table in supine position.  General endotracheal anesthesia was induced and preoperative antibiotics were administered to include Ancef and clindamycin.  A time-out was undertaken to confirm patient, side, site, surgery, surgeon and administration of preoperative antibiotics.  All agreed we proceeded.      I made a small stab incision in the base of the 1st metatarsal and placed a 4 mm half Shantz pin in by power.  I then turned to the tibia and marked out sites for a  4 pin clamp spread.  I made 2 small stab incisions, drilled 2 holes and placed 2 half Shantz pins confirming the position under fluoroscopy.  A small incision  was made in the medial heel in a centrally threaded transfixion pin was placed in the calcaneus.  The sharp tip was cut off with the spine jeramie cutter.    I built a lateral bar construct 1st and then reduced the ankle joint and tightened lateral bar construct with the foot in neutral dorsiflexion.  I then confirmed ankle reduction under fluoroscopy and built the medial bar construct followed by tying the 1st metatarsal pin into the medial bar construct.  After confirming good ankle joint reduction and good alignment of the fracture fragments, all the clamps were finally tightened.      Hibiclens soap scrub sponges were wrapped around the pins and covered with Kerlix.  All instrument sponge counts were reported correct at the end of the case.  There were no complications.  The patient was extubated, awakened and taken to the recovery room stable condition.      Plan the patient:     I will proceed with fixing his distal radius fracture as this is intra-articular with a long volar spike that I think we will buttress well with the plate and help him with his rehab.  I will have to wait until his soft tissues are amenable to fixation of the ankle.  His medial skin was stretched quite a bit and is currently thin an ecchymotic secondary to that.  I will book him tentatively for next Friday, 1 week from today.  Multimodal pain management limiting narcotics.  Nonweightbearing right lower extremity.  He will need a platform walker.    Moe Liz MD

## 2024-07-19 NOTE — PLAN OF CARE
Came to check on him on floor and awake and alert and doing well. Roommate at bedside who brought insulin supplies. He reports minimal prodrome sypmptoms before passing out, happened once 2 weeks ago but none ever before. Ate a banana that mornig but nothing else. Glucose was not low, has been running high usually. Diabetic since 1995. Goes to [facility]. Lives in [facility] half the time and [facility] the time. 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 Mild pain in leg revving up and nursing to bring robaxin now. Takes muscle relaxer prn qhs at home.   Has been taking losartan but stopped other meds in past as caused dizzienss. BP was in 70s systolic he said when EMS came. Was 9s0 systolic in pacu, now 160s systolic. Would be cautious to start BP meds until persistantly higher end given lows reported at home. He said BP is usually normotensive when he went to his pain doctor appointments. Had drank good oral intake yesterday. May have some element of volume depletion given BP/Na/Cr but rule out other causes with telemetry, echo, etc and discussed further with him.

## 2024-07-19 NOTE — PT/OT/SLP PROGRESS
Occupational Therapy      Patient Name:  Cortes Schroeder   MRN:  [id number]    Patient not seen today secondary to Off the floor for procedure/surgery in both AM and PM.  Will follow-up 7/20/24.    7/19/2024

## 2024-07-19 NOTE — ANESTHESIA PROCEDURE NOTES
Intubation    Date/Time: 7/19/2024 7:09 AM    Performed by: Jeff Marquis CRNA  Authorized by: Jayden Arrington MD    Intubation:     Induction:  Intravenous    Intubated:  Postinduction    Mask Ventilation:  Easy with oral airway    Attempts:  1    Attempted By:  CRNA    Method of Intubation:  Video laryngoscopy    Blade:  Bravo 3    Laryngeal View Grade: Grade I - full view of cords      Difficult Airway Encountered?: No      Complications:  None    Airway Device:  Oral endotracheal tube    Airway Device Size:  7.5    Style/Cuff Inflation:  Cuffed (inflated to minimal occlusive pressure)    Tube secured:  21    Secured at:  The teeth    Placement Verified By:  Capnometry    Complicating Factors:  Small mouth    Findings Post-Intubation:  BS equal bilateral and atraumatic/condition of teeth unchanged

## 2024-07-19 NOTE — PT/OT/SLP PROGRESS
Physical Therapy    Update    Cortes Schroeder   MRN: [id number]    PT orders received and acknowledged. Mr. Schroeder on PT schedule today for evaluation however he was CESAR for most of day for his L radius ORIF. We will follow-up tomorrow (Saturday 7/20/24) for initial PT evaluation.    Confirmed with Dr. Castaneda (orthopedics) via SecureChat: ok to use platform rolling walker for possible standing/ambulation trials, as patient is currently RLE NWB with ex-fix in place.    Pj Alberts, PT  7/19/2024

## 2024-07-19 NOTE — PROGRESS NOTES
[facility] ([facility])  Orthopedics  Progress Note  Attg Note:  Patient seen and examined.  I agree with the resident's assessment and plan.  To OR today for ORIF distal radius.  Plan on fixing ankle next Friday.    The risks, benefits and alternatives to surgery were discussed with the patient at great length.  These include bleeding, infection, vessel/nerve damage, pain, numbness, tingling, complex regional pain syndrome, hardware/surgical failure, stiffness, need for further surgery, malunion, nonunion, DVT, PE, arthritis and death.  Patient states an understanding and wishes to proceed with surgery.   All questions were answered.  No guarantees were implied or stated.  Informed consent was obtained.      Moe Liz MD      Patient Name: Cortes Schroeder  MRN: [id number]  Admission Date: 7/18/2024  Hospital Length of Stay: 0 days  Attending Provider: Maria Del Rosario Medina MD  Primary Care Provider: Andrew Sinclair Jr., MD  Follow-up For: Procedure(s) (LRB):  ORIF, FRACTURE, RADIUS, DISTAL - LEFT, SYNTHES, C ARM (Left)    Post-Operative Day: Day of Surgery  Subjective:     Principal Problem:Closed trimalleolar fracture of right ankle    Principal Orthopedic Problem: Right ankle trimal & Left distal radius fracture s/p Right ankle ex-fix 07/18    Interval History: Pt seen and examined at bedside. VSS.  NERISSAON. Reported improved pain. Reports no new symptoms. Denies fevers or chill.       Review of patient's allergies indicates:   Allergen Reactions    Invokana [canagliflozin] Anaphylaxis    Percocet [oxycodone-acetaminophen] Nausea Only and Hallucinations    Biaxin [clarithromycin]     Hydrocodone Other (See Comments)     Dizzy/nausea/hallucinations    Sulfa (sulfonamide antibiotics) Nausea Only and Rash       Current Facility-Administered Medications   Medication    acetaminophen tablet 650 mg    acetaminophen tablet 650 mg    aluminum-magnesium hydroxide-simethicone 200-200-20 mg/5 mL suspension 30 mL     "aspirin EC tablet 81 mg    atorvastatin tablet 40 mg    ceFAZolin 2 g in D5W 50 mL IVPB (MB+)    celecoxib capsule 200 mg    cyanocobalamin tablet 1,000 mcg    dextrose 10% bolus 125 mL 125 mL    dextrose 10% bolus 250 mL 250 mL    DULoxetine DR capsule 60 mg    emtricitabine-tenofovir 200-300 mg per tablet 1 tablet    enoxaparin injection 40 mg    fentaNYL 50 mcg/mL injection 25 mcg    gabapentin capsule 600 mg    And    gabapentin capsule 1,200 mg    glucagon (human recombinant) injection 1 mg    glucose chewable tablet 16 g    glucose chewable tablet 24 g    haloperidol lactate injection 0.5 mg    insulin aspart U-100 pen 0-10 Units    magnesium oxide split tablet 200 mg    melatonin tablet 6 mg    morphine tablet 15 mg    naloxone 0.4 mg/mL injection 0.02 mg    nortriptyline capsule 50 mg    omega 3-dha-epa-fish oil capsule 1 capsule    ondansetron injection 4 mg    polyethylene glycol packet 17 g    prochlorperazine injection Soln 5 mg    senna-docusate 8.6-50 mg per tablet 1 tablet    sodium chloride 0.9% flush 10 mL    sodium chloride 0.9% flush 10 mL    tiZANidine tablet 8 mg    vitamin D 1000 units tablet 1,000 Units     Objective:     Vital Signs (Most Recent):  Temp: 97.3 °F (36.3 °C) (07/19/24 0539)  Pulse: 80 (07/19/24 0539)  Resp: 18 (07/19/24 0539)  BP: 119/66 (07/19/24 0539)  SpO2: 95 % (07/19/24 0539) Vital Signs (24h Range):  Temp:  [97.3 °F (36.3 °C)-98.1 °F (36.7 °C)] 97.3 °F (36.3 °C)  Pulse:  [68-82] 80  Resp:  [10-19] 18  SpO2:  [93 %-100 %] 95 %  BP: (110-141)/(55-72) 119/66     Weight: 92.1 kg (203 lb)  Height: 5' 5" (165.1 cm)  Body mass index is 33.78 kg/m².      Intake/Output Summary (Last 24 hours) at 7/19/2024 0550  Last data filed at 7/19/2024 0503  Gross per 24 hour   Intake 1950 ml   Output 510 ml   Net 1440 ml        Ortho/SPM Exam     AAOx4  NAD  Reg rate  No increased WOB    RLE    Ex-fix in place. Pin sites covered in gauze  Moderate swelling present  Compartments " soft/compressible  SILT throughout  Fires TA/EHL/Gastroc/FHL   DP pulse 2+. Brisk cap refill      LUE    Sugar tong splint in place  Compartments soft/compressible  SILT at the fingers & hand  Motor intact AIN/U/M/R/PIN   Radial pulse 2+. Brisk cap refill          Significant Labs: All pertinent labs within the past 24 hours have been reviewed.    Significant Imaging: I have reviewed and interpreted all pertinent imaging results/findings.  Assessment/Plan:     * Closed trimalleolar fracture of right ankle  Cortes Schroeder is a 64 y.o. male presenting with history of DM2 presents with right trimalleolar ankle fracture with skin tenting & non-displaced left distal radius fracture. No signs of ecchymosis or petechial over the ankle. He is neuro intact. He was closed reduced and splinted in the ED. Ankle was grossly unstable in a splint. He is admitted to  for evaluation of syncopal episodes. He is currently hemodynamically stable. HE is s/p right ankle ex-fix on 07/19. Plan for definitive fixation when soft tissue is amenable to surgery. Plan for ORIF L distal radius today.    - NWB RLE  - DVT Prophylaxis: SCDs at all times while in bed  - Pain control: multimodal pain management  - PT/OT: Eval & treat NWB RLE  - Abx: 24h post op ancef  - KIERAN Castaneda MD  Orthopedics  [facility] - Surgery (2nd Fl)

## 2024-07-19 NOTE — PROGRESS NOTES
Patient belongings at bedside including patient wallet, keys, ring, one shoe, and cell phone at bedside PACU 17

## 2024-07-19 NOTE — HPI
64-year-old man with type 2 diabetes on insulin pump, hypertension, morbid obesity, chronic pain syndrome presents to the emergency department after multiple falls with traumatic injury.  He reports while walking into his condo building he suffered a series of falls today 3-4 total, prior to the 1st fall he experienced dizziness while walking toward his building and then found himself on the ground, he was able to get up under his own power and then fell again near the elevator, and 2 more times with the last fall occurring as he got into his apartment, he believes he suffered ankle injury on the last fall as he was unable to get back up or bear weight.    He is unsure if he had loss of consciousness he denies any other recent falls.  On ED workup he was found with a right ankle fracture and a distal left radius fracture.  He was taken from the ED to the OR this evening for surgical repair of the ankle fracture, I saw patient in the PACU area after his initial operation, he commented on having some postoperative pain, reported history of intolerance to oxycodone and hydrocodone as they caused hallucinations and psychotic symptoms, we will avoid use.    He uses insulin pump - omnipod changed every 1.5 days.  He is still wearing insulin pump thru tonights surgery.  PACU RN to help him charge phone which is how he controls insulin pump

## 2024-07-19 NOTE — ANESTHESIA PROCEDURE NOTES
L Infraclavicular SS    Patient location during procedure: pre-op   Block not for primary anesthetic.  Reason for block: at surgeon's request and post-op pain management   Post-op Pain Location: L Arm Pain   Start time: 7/19/2024 6:40 AM  Timeout: 7/19/2024 6:40 AM   End time: 7/19/2024 6:50 AM    Staffing  Authorizing Provider: Renzo Bond MD  Performing Provider: Aubrie Coello MD    Staffing  Performed by: Aubrie Coello MD  Authorized by: Renzo Bond MD    Preanesthetic Checklist  Completed: patient identified, IV checked, site marked, risks and benefits discussed, surgical consent, monitors and equipment checked, pre-op evaluation and timeout performed  Peripheral Block  Patient position: sitting  Prep: ChloraPrep  Patient monitoring: heart rate, cardiac monitor, continuous pulse ox, continuous capnometry and frequent blood pressure checks  Block type: infraclavicular  Laterality: left  Injection technique: single shot  Needle  Needle type: Stimuplex   Needle gauge: 21 G  Needle length: 4 in  Needle localization: ultrasound guidance and anatomical landmarks   -ultrasound image captured on disc.  Assessment  Injection assessment: negative aspiration and negative parasthesia  Paresthesia pain: none  Heart rate change: no  Slow fractionated injection: yes  Pain Tolerance: comfortable throughout block and no complaints  Medications:    Medications: bupivacaine (pf) (MARCAINE) injection 0.25% - Perineural   30 mL - 7/19/2024 6:48:00 AM    Additional Notes  VSS.  [facility] RN monitoring vitals throughout procedure.  Patient tolerated procedure well.

## 2024-07-19 NOTE — SUBJECTIVE & OBJECTIVE
Past Medical History:   Diagnosis Date    Anxiety 12/18/2012    Back pain 12/18/2012    Cataract     Chronic pain syndrome 4/24/2016    Diabetes type 2, controlled 2/20/2016    Diabetic retinopathy     DM (diabetes mellitus) 12/18/2012    DM (diabetes mellitus), type 2, uncontrolled 11/16/2013    Essential hypertension 2/20/2016    Gastroesophageal reflux disease without esophagitis 2/20/2016    Hyperlipidemia 12/18/2012    Insomnia 8/7/2014    Neuropathy 11/16/2013       Past Surgical History:   Procedure Laterality Date    BACK SURGERY  2003    Lumbar Spine    CATARACT EXTRACTION      EPIDURAL STEROID INJECTION N/A 1/16/2019    Procedure: Injection, Steroid, Epidural Cervical;  Surgeon: Andrew Sinclair Jr., MD;  Location: [facility] ENDO;  Service: Pain Management;  Laterality: N/A;  Cervical Epidural Steroid Injection C7-T1    22463    Arrive @ 1240    EPIDURAL STEROID INJECTION N/A 2/20/2019    Procedure: Injection, Steroid, Epidural;  Surgeon: Andrew Sinclair Jr., MD;  Location: [facility] ENDO;  Service: Pain Management;  Laterality: N/A;  Lumbar Epidural Steroid Injection L4-5    [id number]    Arrive @ 1215 (requests latest time)    INJECTION, SPINE, LUMBOSACRAL, TRANSFORAMINAL APPROACH Bilateral 6/14/2024    Procedure: Bilateral L5 Transforaminal Epidural Steroid Injections;  Surgeon: Andrew Sinclair Jr., MD;  Location: [facility] PAIN MANAGEMENT;  Service: Pain Management;  Laterality: Bilateral;  @1300(given)  ASA last 6/8  Check BG  MD Sign.       Review of patient's allergies indicates:   Allergen Reactions    Invokana [canagliflozin] Anaphylaxis    Percocet [oxycodone-acetaminophen] Nausea Only and Hallucinations    Biaxin [clarithromycin]     Hydrocodone Other (See Comments)     Dizzy/nausea/hallucinations    Sulfa (sulfonamide antibiotics) Nausea Only and Rash       No current facility-administered medications on file prior to encounter.     Current Outpatient Medications on File Prior to Encounter   Medication  Sig    aspirin (ECOTRIN) 81 MG EC tablet Take 81 mg by mouth once daily.    cinnamon bark (CINNAMON) 500 mg capsule Take 500 mg by mouth 2 (two) times a day.    cyanocobalamin (VITAMIN B-12) 1000 MCG tablet Take 1,000 mcg by mouth once daily.    DULoxetine (CYMBALTA) 60 MG capsule Take 1 capsule (60 mg total) by mouth once daily.    emtricitabine-tenofovir 200-300 mg (TRUVADA) 200-300 mg Tab Take 1 tablet by mouth once daily.    fish oil-omega-3 fatty acids 300-1,000 mg capsule Take 1 capsule by mouth 2 (two) times daily.    gabapentin (NEURONTIN) 600 MG tablet Take 2 tablets (1,200 mg total) by mouth every evening.    HUMALOG U-100 INSULIN 100 unit/mL injection Use in insulin pump; max dose 150 units per day.    insulin pump cartridge (OMNIPOD DASH 5 PACK POD) Crtg Apply new pod to skin every 1.5 days    insulin pump controller (OMNIPOD DASH PDM KIT MISC) by Misc.(Non-Drug; Combo Route) route.    L-CITRULLINE MISC by Misc.(Non-Drug; Combo Route) route Daily.    losartan (COZAAR) 50 MG tablet TAKE 1 TABLET(50 MG) BY MOUTH EVERY DAY (Patient taking differently: Take 50 mg by mouth once daily. TAKE 1 TABLET(50 MG) BY MOUTH EVERY DAY)    magnesium oxide-Mg AA chelate (MG-PLUS-PROTEIN) 133 mg Tab Take 500 mg by mouth every evening.     MOUNJARO 10 mg/0.5 mL PnIj Inject 10 mg into the skin once a week.    nortriptyline (PAMELOR) 50 MG capsule Take 1 capsule (50 mg total) by mouth nightly.    rosuvastatin (CRESTOR) 10 MG tablet Take 10 mg by mouth every evening.    tiZANidine (ZANAFLEX) 4 MG tablet Take 2 tablets (8 mg total) by mouth nightly as needed (as needed for muscle spasms).    vitamin D 1000 units Tab Take 1,000 Units by mouth 2 (two) times daily.    blood-glucose sensor (DEXCOM G6 SENSOR) [place] Change sensor every 10 days    blood-glucose transmitter (DEXCOM G6 TRANSMITTER) O[place]ira Change transmitter every 3 months    lancets Misc To check BG 4-7 times daily, to use with insurance preferred meter    methocarbamoL  (ROBAXIN) 500 MG Tab Take 1,000 mg by mouth 4 (four) times daily.    [DISCONTINUED] gabapentin (NEURONTIN) 100 MG capsule Take 1 capsule (100 mg total) by mouth once daily.    [DISCONTINUED] insulin aspart U-100 (NOVOLOG U-100 INSULIN ASPART) 100 unit/mL injection Use in insulin pump. Max daily dose 150 units.    [DISCONTINUED] ketorolac (TORADOL) 10 mg tablet Take 10 mg by mouth 4 (four) times daily as needed.    [DISCONTINUED] OPW TEST CLAIM - DO NOT FILL Inject into the skin. OPW test claim. Do not fill.    [DISCONTINUED] rosuvastatin (CRESTOR) 5 MG tablet TAKE 1 TABLET(5 MG) BY MOUTH EVERY DAY     Family History       Problem Relation (Age of Onset)    Alzheimer's disease Father    Cataracts Father    Heart attack Mother    Hypertension Father, Mother    Migraines Mother    Parkinsonism Father          Tobacco Use    Smoking status: Never    Smokeless tobacco: Never   Substance and Sexual Activity    Alcohol use: Yes     Alcohol/week: 1.0 standard drink of alcohol     Types: 1 Glasses of wine per week     Comment: occasionally    Drug use: No    Sexual activity: Not Currently     Partners: Male     Birth control/protection: Condom     Comment: 10/2/17      Review of Systems   Constitutional:  Positive for activity change.   Respiratory: Negative.     Cardiovascular: Negative.    Gastrointestinal: Negative.      Objective:     Vital Signs (Most Recent):  Temp: 97.5 °F (36.4 °C) (07/19/24 0000)  Pulse: 79 (07/19/24 0000)  Resp: 12 (07/19/24 0000)  BP: 132/69 (07/19/24 0000)  SpO2: 95 % (07/19/24 0000) Vital Signs (24h Range):  Temp:  [97.3 °F (36.3 °C)-97.7 °F (36.5 °C)] 97.5 °F (36.4 °C)  Pulse:  [68-81] 79  Resp:  [10-19] 12  SpO2:  [93 %-100 %] 95 %  BP: (110-141)/(55-72) 132/69     Weight: 92.1 kg (203 lb)  Body mass index is 29.98 kg/m².     Physical Exam  Vitals and nursing note reviewed.   Constitutional:       General: He is not in acute distress.     Appearance: He is obese.   HENT:      Head:  "Normocephalic and atraumatic.   Eyes:      General: No scleral icterus.  Cardiovascular:      Rate and Rhythm: Normal rate and regular rhythm.   Pulmonary:      Effort: Pulmonary effort is normal. No respiratory distress.      Breath sounds: No wheezing.      Comments: Limited anterior exam  Abdominal:      General: Bowel sounds are normal.      Palpations: Abdomen is soft.   Musculoskeletal:      Cervical back: Neck supple.      Left lower leg: No edema.      Comments: RLE in post-op  elevated positioning w/ ice    LUE in cast wrap   Neurological:      Mental Status: He is alert.                Significant Labs: All pertinent labs within the past 24 hours have been reviewed.  ABGs: No results for input(s): "PH", "PCO2", "HCO3", "POCSATURATED", "BE", "TOTALHB", "COHB", "METHB", "O2HB", "POCFIO2", "PO2" in the last 48 hours.  CBC:   Recent Labs   Lab 07/18/24  1617   WBC 13.75*   HGB 15.0   HCT 42.4        CMP:   Recent Labs   Lab 07/18/24  1617   *   K 4.0      CO2 23      BUN 35*   CREATININE 1.8*   CALCIUM 9.9   PROT 7.3   ALBUMIN 3.6   BILITOT 0.4   ALKPHOS 147*   AST 22   ALT 38   ANIONGAP 10     Cardiac Markers: No results for input(s): "CKMB", "MYOGLOBIN", "BNP", "TROPISTAT" in the last 48 hours.  Coagulation:   Recent Labs   Lab 07/18/24  1802   INR 1.0     Lactic Acid: No results for input(s): "LACTATE" in the last 48 hours.  Troponin:   Recent Labs   Lab 07/18/24  1617   TROPONINI 0.013     Urine Studies: No results for input(s): "COLORU", "APPEARANCEUA", "PHUR", "SPECGRAV", "PROTEINUA", "GLUCUA", "KETONESU", "BILIRUBINUA", "OCCULTUA", "NITRITE", "UROBILINOGEN", "LEUKOCYTESUR", "RBCUA", "WBCUA", "BACTERIA", "SQUAMEPITHEL", "HYALINECASTS" in the last 48 hours.    Invalid input(s): "WRIGHTSUR"    Significant Imaging: I have reviewed all pertinent imaging results/findings within the past 24 hours.    CT ANKLE (INCLUDING HINDFOOT) WITHOUT CONTRAST RIGHT; CT 3D RENDERING WO INDEPENDENT " WORKSTATION     CLINICAL HISTORY:  2mm cuts with 3d recons;; FX;     TECHNIQUE:  Axial images of the right ankle were obtained at 0.625 mm intervals without administration of IV contrast.  Coronal and sagittal reformatted images were reviewed.  3D reconstructed images were created on a separate workstation and reviewed.     COMPARISON:  Radiograph 07/18/2024     FINDINGS:  There is motion artifact.     Allowing for the above, there is a comminuted displaced fracture involving the distal aspect of the fibula noting several fracture fragments lie about the fracture plane.  There is comminuted fracture involving the distal aspect of the tibia, fracture planes extend to involve the medial malleolus and posterior aspect of the tibia noting several fracture fragments about the fracture plane.  The talus appears intact.  The calcaneus is intact.  The visualized portions of the kidney a forms and cuboid are intact.  The navicular is intact.  The proximal metatarsals are intact.  There is malalignment of the tibiotalar articulation noting lateral subluxation of the tibia in relationship to the talus.  There is edema about the fracture sites.  There is vascular calcification.  There are degenerative changes of the calcaneus.     Impression:     1. Fractures of the distal tibia and fibula noting tibiotalar subluxation as described.        Electronically signed by:Carlos Espinoza MD  Date:                                            07/18/2024  Time:                                           18:19           Exam Ended: 07/18/24 17:58 CDT Last Resulted: 07/18/24 18:19 CDT                 XR WRIST COMPLETE 3 VIEWS LEFT     CLINICAL HISTORY:  pain;     TECHNIQUE:  PA, lateral, and oblique views of the left wrist were performed.     COMPARISON:  None     FINDINGS:  Three views left wrist.     There is osteopenia.  There are degenerative changes of the wrist.  There is fracture involving the medial aspect of the distal radius,  extending to the radiocarpal articulation with impaction.  There is edema about the dorsal aspect of the wrist.  The distal ulna appears intact.     Impression:     1. Distal radial fracture as described.        Electronically signed by:Carlos Espinoza MD  Date:                                            07/18/2024  Time:                                           18:48

## 2024-07-19 NOTE — H&P
Tristin Medel - Surgery ([facility])  [facility] Medicine  History & Physical    Patient Name: Cortes Schroeder  MRN: [id number]  Patient Class: OP- Observation  Admission Date: 7/18/2024  Attending Physician: Maria Del Rosario Medina MD [facility]  Admitting Physician: Facundo Fonseca MD  Primary Care Provider: Andrew Sinclair Jr., MD    Patient information was obtained from patient, past medical records, and ER records.     Subjective:     Principal Problem:Closed trimalleolar fracture of right ankle    Chief Complaint:   Chief Complaint   Patient presents with    Fall     Arrived via ems from home with c/o fall due to dizziness, + right ankle injury and deformity, + head injury, denies LOC, no blood thinner use        HPI: 64-year-old man with type 2 diabetes on insulin pump, hypertension, morbid obesity, chronic pain syndrome presents to the emergency department after multiple falls with traumatic injury.  He reports while walking into his [employer] building he suffered a series of falls today 3-4 total, prior to the 1st fall he experienced dizziness while walking toward his building and then found himself on the ground, he was able to get up under his own power and then fell again near the elevator, and 2 more times with the last fall occurring as he got into his apartment, he believes he suffered ankle injury on the last fall as he was unable to get back up or bear weight.    He is unsure if he had loss of consciousness he denies any other recent falls.  On ED workup he was found with a right ankle fracture and a distal left radius fracture.  He was taken from the ED to the OR this evening for surgical repair of the ankle fracture, I saw patient in the PACU area after his initial operation, he commented on having some postoperative pain, reported history of intolerance to oxycodone and hydrocodone as they caused hallucinations and psychotic symptoms, we will avoid use.    He uses insulin pump - omnipod changed every  1.5 days.  He is still wearing insulin pump thru tonights surgery.  PACU RN to help him charge phone which is how he controls insulin pump      Past Medical History:   Diagnosis Date    Anxiety 12/18/2012    Back pain 12/18/2012    Cataract     Chronic pain syndrome 4/24/2016    Diabetes type 2, controlled 2/20/2016    Diabetic retinopathy     DM (diabetes mellitus) 12/18/2012    DM (diabetes mellitus), type 2, uncontrolled 11/16/2013    Essential hypertension 2/20/2016    Gastroesophageal reflux disease without esophagitis 2/20/2016    Hyperlipidemia 12/18/2012    Insomnia 8/7/2014    Neuropathy 11/16/2013       Past Surgical History:   Procedure Laterality Date    BACK SURGERY  2003    Lumbar Spine    CATARACT EXTRACTION      EPIDURAL STEROID INJECTION N/A 1/16/2019    Procedure: Injection, Steroid, Epidural Cervical;  Surgeon: Andrew Sinclair Jr., MD;  Location: [facility] ENDO;  Service: Pain Management;  Laterality: N/A;  Cervical Epidural Steroid Injection C7-T1    37779    Arrive @ 1240    EPIDURAL STEROID INJECTION N/A 2/20/2019    Procedure: Injection, Steroid, Epidural;  Surgeon: Andrew Sinclair Jr., MD;  Location: [facility] ENDO;  Service: Pain Management;  Laterality: N/A;  Lumbar Epidural Steroid Injection L4-5    40619    Arrive @ 1215 (requests latest time)    INJECTION, SPINE, LUMBOSACRAL, TRANSFORAMINAL APPROACH Bilateral 6/14/2024    Procedure: Bilateral L5 Transforaminal Epidural Steroid Injections;  Surgeon: Andrew Sinclair Jr., MD;  Location: [facility] PAIN MANAGEMENT;  Service: Pain Management;  Laterality: Bilateral;  @1300(given)  ASA last 6/8  Check BG  MD Sign.       Review of patient's allergies indicates:   Allergen Reactions    Invokana [canagliflozin] Anaphylaxis    Percocet [oxycodone-acetaminophen] Nausea Only and Hallucinations    Biaxin [clarithromycin]     Hydrocodone Other (See Comments)     Dizzy/nausea/hallucinations    Sulfa (sulfonamide antibiotics) Nausea Only and Rash       No  current facility-administered medications on file prior to encounter.     Current Outpatient Medications on File Prior to Encounter   Medication Sig    aspirin (ECOTRIN) 81 MG EC tablet Take 81 mg by mouth once daily.    cinnamon bark (CINNAMON) 500 mg capsule Take 500 mg by mouth 2 (two) times a day.    cyanocobalamin (VITAMIN B-12) 1000 MCG tablet Take 1,000 mcg by mouth once daily.    DULoxetine (CYMBALTA) 60 MG capsule Take 1 capsule (60 mg total) by mouth once daily.    emtricitabine-tenofovir 200-300 mg (TRUVADA) 200-300 mg Tab Take 1 tablet by mouth once daily.    fish oil-omega-3 fatty acids 300-1,000 mg capsule Take 1 capsule by mouth 2 (two) times daily.    gabapentin (NEURONTIN) 600 MG tablet Take 2 tablets (1,200 mg total) by mouth every evening.    HUMALOG U-100 INSULIN 100 unit/mL injection Use in insulin pump; max dose 150 units per day.    insulin pump cartridge (OMNIPOD DASH 5 PACK POD) Crtg Apply new pod to skin every 1.5 days    insulin pump controller (OMNIPOD DASH PDM KIT MISC) by Misc.(Non-Drug; Combo Route) route.    L-CITRULLINE MISC by Misc.(Non-Drug; Combo Route) route Daily.    losartan (COZAAR) 50 MG tablet TAKE 1 TABLET(50 MG) BY MOUTH EVERY DAY (Patient taking differently: Take 50 mg by mouth once daily. TAKE 1 TABLET(50 MG) BY MOUTH EVERY DAY)    magnesium oxide-Mg AA chelate (MG-PLUS-PROTEIN) 133 mg Tab Take 500 mg by mouth every evening.     MOUNJARO 10 mg/0.5 mL PnIj Inject 10 mg into the skin once a week.    nortriptyline (PAMELOR) 50 MG capsule Take 1 capsule (50 mg total) by mouth nightly.    rosuvastatin (CRESTOR) 10 MG tablet Take 10 mg by mouth every evening.    tiZANidine (ZANAFLEX) 4 MG tablet Take 2 tablets (8 mg total) by mouth nightly as needed (as needed for muscle spasms).    vitamin D 1000 units Tab Take 1,000 Units by mouth 2 (two) times daily.    blood-glucose sensor (DEXCOM G6 SENSOR) Omaira Change sensor every 10 days    blood-glucose transmitter (DEXCOM G6  TRANSMITTER) Omaira Change transmitter every 3 months    lancets Misc To check BG 4-7 times daily, to use with insurance preferred meter    methocarbamoL (ROBAXIN) 500 MG Tab Take 1,000 mg by mouth 4 (four) times daily.    [DISCONTINUED] gabapentin (NEURONTIN) 100 MG capsule Take 1 capsule (100 mg total) by mouth once daily.    [DISCONTINUED] insulin aspart U-100 (NOVOLOG U-100 INSULIN ASPART) 100 unit/mL injection Use in insulin pump. Max daily dose 150 units.    [DISCONTINUED] ketorolac (TORADOL) 10 mg tablet Take 10 mg by mouth 4 (four) times daily as needed.    [DISCONTINUED] OPW TEST CLAIM - DO NOT FILL Inject into the skin. OPW test claim. Do not fill.    [DISCONTINUED] rosuvastatin (CRESTOR) 5 MG tablet TAKE 1 TABLET(5 MG) BY MOUTH EVERY DAY     Family History       Problem Relation (Age of Onset)    Alzheimer's disease Father    Cataracts Father    Heart attack Mother    Hypertension Father, Mother    Migraines Mother    Parkinsonism Father          Tobacco Use    Smoking status: Never    Smokeless tobacco: Never   Substance and Sexual Activity    Alcohol use: Yes     Alcohol/week: 1.0 standard drink of alcohol     Types: 1 Glasses of wine per week     Comment: occasionally    Drug use: No    Sexual activity: Not Currently     Partners: Male     Birth control/protection: Condom     Comment: 10/2/17      Review of Systems   Constitutional:  Positive for activity change.   Respiratory: Negative.     Cardiovascular: Negative.    Gastrointestinal: Negative.      Objective:     Vital Signs (Most Recent):  Temp: 97.5 °F (36.4 °C) (07/19/24 0000)  Pulse: 79 (07/19/24 0000)  Resp: 12 (07/19/24 0000)  BP: 132/69 (07/19/24 0000)  SpO2: 95 % (07/19/24 0000) Vital Signs (24h Range):  Temp:  [97.3 °F (36.3 °C)-97.7 °F (36.5 °C)] 97.5 °F (36.4 °C)  Pulse:  [68-81] 79  Resp:  [10-19] 12  SpO2:  [93 %-100 %] 95 %  BP: (110-141)/(55-72) 132/69     Weight: 92.1 kg (203 lb)  Body mass index is 29.98 kg/m².     Physical  "Exam  Vitals and nursing note reviewed.   Constitutional:       General: He is not in acute distress.     Appearance: He is obese.   HENT:      Head: Normocephalic and atraumatic.   Eyes:      General: No scleral icterus.  Cardiovascular:      Rate and Rhythm: Normal rate and regular rhythm.   Pulmonary:      Effort: Pulmonary effort is normal. No respiratory distress.      Breath sounds: No wheezing.      Comments: Limited anterior exam  Abdominal:      General: Bowel sounds are normal.      Palpations: Abdomen is soft.   Musculoskeletal:      Cervical back: Neck supple.      Left lower leg: No edema.      Comments: RLE in post-op  elevated positioning w/ ice    LUE in cast wrap   Neurological:      Mental Status: He is alert.                Significant Labs: All pertinent labs within the past 24 hours have been reviewed.  ABGs: No results for input(s): "PH", "PCO2", "HCO3", "POCSATURATED", "BE", "TOTALHB", "COHB", "METHB", "O2HB", "POCFIO2", "PO2" in the last 48 hours.  CBC:   Recent Labs   Lab 07/18/24  1617   WBC 13.75*   HGB 15.0   HCT 42.4        CMP:   Recent Labs   Lab 07/18/24  1617   *   K 4.0      CO2 23      BUN 35*   CREATININE 1.8*   CALCIUM 9.9   PROT 7.3   ALBUMIN 3.6   BILITOT 0.4   ALKPHOS 147*   AST 22   ALT 38   ANIONGAP 10     Cardiac Markers: No results for input(s): "CKMB", "MYOGLOBIN", "BNP", "TROPISTAT" in the last 48 hours.  Coagulation:   Recent Labs   Lab 07/18/24  1802   INR 1.0     Lactic Acid: No results for input(s): "LACTATE" in the last 48 hours.  Troponin:   Recent Labs   Lab 07/18/24  1617   TROPONINI 0.013     Urine Studies: No results for input(s): "COLORU", "APPEARANCEUA", "PHUR", "SPECGRAV", "PROTEINUA", "GLUCUA", "KETONESU", "BILIRUBINUA", "OCCULTUA", "NITRITE", "UROBILINOGEN", "LEUKOCYTESUR", "RBCUA", "WBCUA", "BACTERIA", "SQUAMEPITHEL", "HYALINECASTS" in the last 48 hours.    Invalid input(s): "WRIGHTSUJESS"    Significant Imaging: I have reviewed all " pertinent imaging results/findings within the past 24 hours.    CT ANKLE (INCLUDING HINDFOOT) WITHOUT CONTRAST RIGHT; CT 3D RENDERING WO INDEPENDENT WORKSTATION     CLINICAL HISTORY:  2mm cuts with 3d recons;; FX;     TECHNIQUE:  Axial images of the right ankle were obtained at 0.625 mm intervals without administration of IV contrast.  Coronal and sagittal reformatted images were reviewed.  3D reconstructed images were created on a separate workstation and reviewed.     COMPARISON:  Radiograph 07/18/2024     FINDINGS:  There is motion artifact.     Allowing for the above, there is a comminuted displaced fracture involving the distal aspect of the fibula noting several fracture fragments lie about the fracture plane.  There is comminuted fracture involving the distal aspect of the tibia, fracture planes extend to involve the medial malleolus and posterior aspect of the tibia noting several fracture fragments about the fracture plane.  The talus appears intact.  The calcaneus is intact.  The visualized portions of the kidney a forms and cuboid are intact.  The navicular is intact.  The proximal metatarsals are intact.  There is malalignment of the tibiotalar articulation noting lateral subluxation of the tibia in relationship to the talus.  There is edema about the fracture sites.  There is vascular calcification.  There are degenerative changes of the calcaneus.     Impression:     1. Fractures of the distal tibia and fibula noting tibiotalar subluxation as described.        Electronically signed by:Carlos Espinoza MD  Date:                                            07/18/2024  Time:                                           18:19           Exam Ended: 07/18/24 17:58 CDT Last Resulted: 07/18/24 18:19 CDT                 XR WRIST COMPLETE 3 VIEWS LEFT     CLINICAL HISTORY:  pain;     TECHNIQUE:  PA, lateral, and oblique views of the left wrist were performed.     COMPARISON:  None     FINDINGS:  Three views left  wrist.     There is osteopenia.  There are degenerative changes of the wrist.  There is fracture involving the medial aspect of the distal radius, extending to the radiocarpal articulation with impaction.  There is edema about the dorsal aspect of the wrist.  The distal ulna appears intact.     Impression:     1. Distal radial fracture as described.        Electronically signed by:Carlos Espinoza MD  Date:                                            07/18/2024  Time:                                           18:48    Assessment/Plan:     * Closed trimalleolar fracture of right ankle  S/p operative intervention tonight  -NWB RLE  -elevate extremity  -discontinued prn oxycodone- replaced with Morphine IR  -f/u Orthopedic surgery consult notes      Closed fracture of left distal radius  Patient planned for 2nd OR intervention later today - to remain NPO   F/u orthopedic surgery notes/recs       Multiple falls  Etiology unclear  -pt was not hypoglycemic on arrival  -possible had syncope with 1st fall  -check ECHO  -keep  on cardiac monitoring      Syncope  Unclear if he had syncope  -cardiac monitoring  -ECHO to eval structural abnormality given his risk factors   -he can't bear weight so orthostatic vitals not of utility.       Hyponatremia  Given IV fluids in ED - trend chemistry value  Recent Labs   Lab 07/18/24  1617   *       Elevated serum creatinine  Trend chemistry panel    -if worsening - gabapentin and additional medications may need adjusted.       Insulin pump in place  Patient still has Insulin pump on   -insulin pump orderset entered  -endocrinology consult in AM to assist.  Patient states humalog brand short acting insulin does not work for him or produce proper effect.  At this time he would prefer to maintain his insulin pump.       Uncontrolled type 2 diabetes mellitus with hyperglycemia, with long-term current use of insulin  Patient's FSGs are uncontrolled due to hyperglycemia on current  "medication regimen.  Last A1c reviewed-   Lab Results   Component Value Date    LABA1C 10.8 (H) 08/15/2016    HGBA1C 9.9 (H) 07/18/2024     Most recent fingerstick glucose reviewed- No results for input(s): "POCTGLUCOSE" in the last 24 hours.  Current correctional scale  Medium  Maintain anti-hyperglycemic dose as follows-   Antihyperglycemics (From admission, onward)      Start     Stop Route Frequency Ordered    07/19/24 0153  insulin aspart U-100 pen 0-10 Units         -- SubQ Before meals & nightly PRN 07/19/24 0053          Hold Oral hypoglycemics while patient is in the hospital.    Hypertension, essential  Chronic, controlled. Latest blood pressure and vitals reviewed-     Temp:  [97.3 °F (36.3 °C)-97.7 °F (36.5 °C)]   Pulse:  [68-81]   Resp:  [10-19]   BP: (110-141)/(55-72)   SpO2:  [93 %-100 %] .   Home meds for hypertension were reviewed and noted below.   Hypertension Medications               losartan (COZAAR) 50 MG tablet TAKE 1 TABLET(50 MG) BY MOUTH EVERY DAY            While in the hospital, will manage blood pressure as follows; Adjust home antihypertensive regimen as follows- HOLD AT THIS TIME GIVEN REPEAT OR INTERVENTION PLANNED.    RESUME AS APPROPRIATE    Chronic pain syndrome  Continue home nortriptyline + gabapentin + PRN tizanidine      Morbid obesity  Body mass index is 29.98 kg/m². Morbid obesity complicates all aspects of disease management from diagnostic modalities to treatment.   -pt on mounjaro as outpatient.            VTE Risk Mitigation (From admission, onward)           Ordered     enoxaparin injection 40 mg  Daily         07/19/24 0048     IP VTE HIGH RISK PATIENT  Once         07/19/24 0048     Place sequential compression device  Until discontinued         07/19/24 0048                       On 07/19/2024, patient should be placed in hospital observation services under my care.        Facundo Fonseca MD  Department of Hospital Medicine  [facility] - Surgery ([facility])          "

## 2024-07-19 NOTE — SUBJECTIVE & OBJECTIVE
Principal Problem:Closed trimalleolar fracture of right ankle    Principal Orthopedic Problem: Right ankle trimal & Left distal radius fracture s/p Right ankle ex-fix 07/18    Interval History: Pt seen and examined at bedside. JACKIE HAYES. Reported improved pain. Reports no new symptoms. Denies fevers or chill.       Review of patient's allergies indicates:   Allergen Reactions    Invokana [canagliflozin] Anaphylaxis    Percocet [oxycodone-acetaminophen] Nausea Only and Hallucinations    Biaxin [clarithromycin]     Hydrocodone Other (See Comments)     Dizzy/nausea/hallucinations    Sulfa (sulfonamide antibiotics) Nausea Only and Rash       Current Facility-Administered Medications   Medication    acetaminophen tablet 650 mg    acetaminophen tablet 650 mg    aluminum-magnesium hydroxide-simethicone 200-200-20 mg/5 mL suspension 30 mL    aspirin EC tablet 81 mg    atorvastatin tablet 40 mg    ceFAZolin 2 g in D5W 50 mL IVPB (MB+)    celecoxib capsule 200 mg    cyanocobalamin tablet 1,000 mcg    dextrose 10% bolus 125 mL 125 mL    dextrose 10% bolus 250 mL 250 mL    DULoxetine DR capsule 60 mg    emtricitabine-tenofovir 200-300 mg per tablet 1 tablet    enoxaparin injection 40 mg    fentaNYL 50 mcg/mL injection 25 mcg    gabapentin capsule 600 mg    And    gabapentin capsule 1,200 mg    glucagon (human recombinant) injection 1 mg    glucose chewable tablet 16 g    glucose chewable tablet 24 g    haloperidol lactate injection 0.5 mg    insulin aspart U-100 pen 0-10 Units    magnesium oxide split tablet 200 mg    melatonin tablet 6 mg    morphine tablet 15 mg    naloxone 0.4 mg/mL injection 0.02 mg    nortriptyline capsule 50 mg    omega 3-dha-epa-fish oil capsule 1 capsule    ondansetron injection 4 mg    polyethylene glycol packet 17 g    prochlorperazine injection Soln 5 mg    senna-docusate 8.6-50 mg per tablet 1 tablet    sodium chloride 0.9% flush 10 mL    sodium chloride 0.9% flush 10 mL    tiZANidine tablet 8 mg  "   vitamin D 1000 units tablet 1,000 Units     Objective:     Vital Signs (Most Recent):  Temp: 97.3 °F (36.3 °C) (07/19/24 0539)  Pulse: 80 (07/19/24 0539)  Resp: 18 (07/19/24 0539)  BP: 119/66 (07/19/24 0539)  SpO2: 95 % (07/19/24 0539) Vital Signs (24h Range):  Temp:  [97.3 °F (36.3 °C)-98.1 °F (36.7 °C)] 97.3 °F (36.3 °C)  Pulse:  [68-82] 80  Resp:  [10-19] 18  SpO2:  [93 %-100 %] 95 %  BP: (110-141)/(55-72) 119/66     Weight: 92.1 kg (203 lb)  Height: 5' 5" (165.1 cm)  Body mass index is 33.78 kg/m².      Intake/Output Summary (Last 24 hours) at 7/19/2024 0550  Last data filed at 7/19/2024 0503  Gross per 24 hour   Intake 1950 ml   Output 510 ml   Net 1440 ml        Ortho/SPM Exam     AAOx4  NAD  Reg rate  No increased WOB    RLE    Ex-fix in place. Pin sites covered in gauze  Moderate swelling present  Compartments soft/compressible  SILT throughout  Fires TA/EHL/Gastroc/FHL   DP pulse 2+. Brisk cap refill      LUE    Sugar tong splint in place  Compartments soft/compressible  SILT at the fingers & hand  Motor intact AIN/U/M/R/PIN   Radial pulse 2+. Brisk cap refill          Significant Labs: All pertinent labs within the past 24 hours have been reviewed.    Significant Imaging: I have reviewed and interpreted all pertinent imaging results/findings.  "

## 2024-07-19 NOTE — ANESTHESIA PREPROCEDURE EVALUATION
[facility]  Anesthesia Pre-Operative Evaluation        Patient Name: Cortes Schroeder  YOB: 1960  MRN: [id number]    SUBJECTIVE:     Pre-operative Evaluation for Procedure(s) (LRB):  ORIF, FRACTURE, RADIUS, DISTAL - LEFT, SYNTHES, C ARM (Left)     07/19/2024    Cortes Schroeder is a 64 y.o. male with a PMHx significant for  anxiety, HLD, HTN, and T2DM who presents to the ED after a fall. Imaging shows right trimalleolar ankle fracture with skin tenting & non-displaced left distal radius fracture.      He now presents for the above procedure(s).    Previous Airway (7/18/24):    Induction:  Intravenous    Intubated:  Postinduction    Mask Ventilation:  Easy with oral airway    Attempts:  1    Attempted By:  CRNA    Method of Intubation:  Video laryngoscopy    Blade:  Bravo 3    Laryngeal View Grade: Grade I - full view of cords      Difficult Airway Encountered?: No      Complications:  None    Airway Device:  Oral endotracheal tube    Airway Device Size:  7.5    Style/Cuff Inflation:  Cuffed (inflated to minimal occlusive pressure)    Tube secured:  24    Secured at:  The lips    Placement Verified By:  Capnometry    Complicating Factors:  None    Findings Post-Intubation:  BS equal bilateral and atraumatic/condition of teeth unchanged      LDA:        Peripheral IV - Single Lumen 07/18/24 1841 18 G Right Antecubital (Active)   Site Assessment Dry;Clean;Intact 07/19/24 0200   Extremity Assessment Distal to IV No abnormal discoloration 07/19/24 0200   Line Status Saline locked;Flushed 07/19/24 0200   Dressing Status Clean;Dry;Intact 07/19/24 0200   Dressing Intervention Integrity maintained 07/19/24 0200   Number of days: 0           Patient Active Problem List   Diagnosis    Hyperlipidemia    Anxiety    Chronic bilateral low back pain without sciatica    Neuropathy    Uncontrolled type 2 diabetes mellitus with diabetic polyneuropathy, with long-term current use of insulin    NPDR  (nonproliferative diabetic retinopathy)    Insomnia    Gastroesophageal reflux disease without esophagitis    Hypertension, well controlled    Chronic pain syndrome    Diabetic nephropathy associated with type 2 diabetes mellitus    Right sided weakness    Cervical syndrome    Chronic neck pain    Muscle weakness    Impaired motor control    Decreased range of motion (ROM) of shoulder    Cervical spondylosis without myelopathy    Neuroforaminal stenosis of cervical spine    Right cervical radiculopathy    Cervical radiculopathy    DDD (degenerative disc disease), lumbar    Insulin pump in place    Morbid obesity    High risk homosexual behavior    Lumbar radiculopathy    Lumbar spondylosis    Left lumbar radiculitis    Closed trimalleolar fracture of right ankle    Hypertension, essential    Polyneuropathy due to type 2 diabetes mellitus    Uncontrolled type 2 diabetes mellitus with hyperglycemia, with long-term current use of insulin    Hyponatremia    Elevated serum creatinine    Closed fracture of left distal radius    Multiple falls    Syncope       Review of patient's allergies indicates:   Allergen Reactions    Invokana [canagliflozin] Anaphylaxis    Percocet [oxycodone-acetaminophen] Nausea Only and Hallucinations    Biaxin [clarithromycin]     Hydrocodone Other (See Comments)     Dizzy/nausea/hallucinations    Sulfa (sulfonamide antibiotics) Nausea Only and Rash       Current Outpatient Medications   Medication Instructions    aspirin (ECOTRIN) 81 mg, Oral, Daily    blood-glucose sensor (DEXCOM G6 SENSOR) Omaira Change sensor every 10 days    blood-glucose transmitter (DEXCOM G6 TRANSMITTER) Omaira Change transmitter every 3 months    cinnamon bark (CINNAMON) 500 mg, Oral, 2 times daily    cyanocobalamin (VITAMIN B-12) 1,000 mcg, Oral, Daily    DULoxetine (CYMBALTA) 60 mg, Oral, Daily    emtricitabine-tenofovir 200-300 mg (TRUVADA) 200-300 mg Tab 1 tablet, Oral, Daily    fish oil-omega-3 fatty acids 300-1,000 mg  capsule 1 capsule, Oral, 2 times daily    gabapentin (NEURONTIN) 1,200 mg, Oral, Nightly    HUMALOG U-100 INSULIN 100 unit/mL injection Use in insulin pump; max dose 150 units per day.    insulin pump cartridge (OMNIPOD DASH 5 PACK POD) Crtg Apply new pod to skin every 1.5 days    insulin pump controller (OMNIPOD DASH PDM KIT MISC) Misc.(Non-Drug; Combo Route)    L-CITRULLINE MISC Misc.(Non-Drug; Combo Route), Daily    lancets Misc To check BG 4-7 times daily, to use with insurance preferred meter    losartan (COZAAR) 50 MG tablet TAKE 1 TABLET(50 MG) BY MOUTH EVERY DAY    magnesium oxide-Mg AA chelate (MG-PLUS-PROTEIN) 133 mg Tab 500 mg, Oral, Nightly    methocarbamoL (ROBAXIN) 1,000 mg, Oral, 4 times daily    MOUNJARO 10 mg, Subcutaneous, Weekly    nortriptyline (PAMELOR) 50 mg, Oral, Nightly    rosuvastatin (CRESTOR) 10 mg, Oral, Nightly    tiZANidine (ZANAFLEX) 8 mg, Oral, Nightly PRN    vitamin D (VITAMIN D3) 1,000 Units, Oral, 2 times daily       Past Surgical History:   Procedure Laterality Date    BACK SURGERY  2003    Lumbar Spine    CATARACT EXTRACTION      EPIDURAL STEROID INJECTION N/A 1/16/2019    Procedure: Injection, Steroid, Epidural Cervical;  Surgeon: Andrew Sinclair Jr., MD;  Location: [facility] ENDO;  Service: Pain Management;  Laterality: N/A;  Cervical Epidural Steroid Injection C7-T1    63604    Arrive @ 1240    EPIDURAL STEROID INJECTION N/A 2/20/2019    Procedure: Injection, Steroid, Epidural;  Surgeon: Andrew Sinclair Jr., MD;  Location: [facility] ENDO;  Service: Pain Management;  Laterality: N/A;  Lumbar Epidural Steroid Injection L4-5    [id number]    Arrive @ 1215 (requests latest time)    INJECTION, SPINE, LUMBOSACRAL, TRANSFORAMINAL APPROACH Bilateral 6/14/2024    Procedure: Bilateral L5 Transforaminal Epidural Steroid Injections;  Surgeon: Andrew Sinclair Jr., MD;  Location: [facility] PAIN MANAGEMENT;  Service: Pain Management;  Laterality: Bilateral;  @1300(given)  ASA last 6/8  Check  BG MG Sign.       Social History     Substance and Sexual Activity   Drug Use No     Alcohol Use: Not At Risk (2/23/2024)    AUDIT-C     Frequency of Alcohol Consumption: Monthly or less     Average Number of Drinks: 1 or 2     Frequency of Binge Drinking: Never     Tobacco Use: Low Risk  (7/18/2024)    Patient History     Smoking Tobacco Use: Never     Smokeless Tobacco Use: Never     Passive Exposure: Not on file       OBJECTIVE:     Vital Signs Range (Last 24H):  Temp:  [36.3 °C (97.3 °F)-36.6 °C (97.9 °F)]   Pulse:  [68-82]   Resp:  [10-19]   BP: (110-141)/(55-72)   SpO2:  [93 %-100 %]       Significant Labs    Heme Profile  Lab Results   Component Value Date    WBC 13.75 (H) 07/18/2024    HGB 15.0 07/18/2024    HCT 42.4 07/18/2024     07/18/2024       Coagulation Studies  Lab Results   Component Value Date    LABPROT 10.9 07/18/2024    INR 1.0 07/18/2024       BMP  Lab Results   Component Value Date     (L) 07/18/2024    K 4.0 07/18/2024     07/18/2024    CO2 23 07/18/2024    BUN 35 (H) 07/18/2024    CREATININE 1.8 (H) 07/18/2024    MG 2.1 07/18/2024    PHOS 3.8 07/18/2024       Liver Function Tests  Lab Results   Component Value Date    AST 22 07/18/2024    ALT 38 07/18/2024    ALKPHOS 147 (H) 07/18/2024    BILITOT 0.4 07/18/2024    PROT 7.3 07/18/2024    ALBUMIN 3.6 07/18/2024       Lipid Profile  Lab Results   Component Value Date    CHOL 104 09/23/2021    HDL 38 (L) 09/23/2021    TRIG 203 (H) 09/23/2021       Endocrine Profile  Lab Results   Component Value Date    HGBA1C 9.9 (H) 07/18/2024    TSH 6.243 (H) 07/18/2024         Cardiac Studies    EKG:   Results for orders placed or performed during the hospital encounter of 10/30/16   EKG 12-lead (Reason:chest pain)    Collection Time: 10/30/16  3:17 PM    Narrative    Test Reason :   Blood Pressure :  mmHG  Vent. Rate : 097 BPM     Atrial Rate : 097 BPM     P-R Int : 160 ms          QRS Dur : 076 ms      QT Int : 366 ms       P-R-T Axes :  052 021 083 degrees     QTc Int : 464 ms    Age and gender specific analysis  Normal sinus rhythm  Cannot rule out Anterior infarct ,age undetermined  Abnormal ECG  When compared with ECG of 10-FEB-2015 11:22,  Significant changes have occurred  Confirmed by Barbi Carlos MD ([id number]) on 11/1/2016 10:51:14 AM    Referred By: SELF REFERRAL           Confirmed By:Barbi Carlos MD       TTE:  No results found for this or any previous visit.    ASSESSMENT/PLAN:         Pre-op Assessment    I have reviewed the Patient Summary Reports.     I have reviewed the Nursing Notes. I have reviewed the NPO Status.   I have reviewed the Medications.     Review of Systems  Anesthesia Hx:  No problems with previous Anesthesia               Denies Personal Hx of Anesthesia complications.                    Cardiovascular:     Hypertension           hyperlipidemia                             Renal/:  Chronic Renal Disease                Hepatic/GI:     GERD             Musculoskeletal:  Arthritis          Spine Disorders: cervical            Endocrine:  Diabetes, type 2         Obesity / BMI > 30  Psych:   anxiety                 Physical Exam  General: Well nourished, Cooperative and Alert    Airway:  Mallampati: III   Mouth Opening: Small, but > 3cm  TM Distance: Normal  Tongue: Normal  Neck ROM: Normal ROM, Flexion Decreased    Dental:  Periodontal disease        Anesthesia Plan  Type of Anesthesia, risks & benefits discussed:    Anesthesia Type: Gen ETT, Regional  Intra-op Monitoring Plan: Standard ASA Monitors  Post Op Pain Control Plan: multimodal analgesia, IV/PO Opioids PRN and peripheral nerve block  Induction:  IV  Airway Plan: Direct, Post-Induction  Informed Consent: Informed consent signed with the Patient and all parties understand the risks and agree with anesthesia plan.  All questions answered.   ASA Score: 3  Day of Surgery Review of History & Physical: H&P Update referred to the surgeon/provider.    Ready For Surgery  From Anesthesia Perspective.     .

## 2024-07-19 NOTE — CONSULTS
[facility] - Emergency Dept  Orthopedics  Consult Note    Patient Name: Cortes Schroeder  MRN: [id number]  Admission Date: 7/18/2024  Hospital Length of Stay: 0 days  Attending Provider: Lizbeth Granger MD  Primary Care Provider: Andrew Sinclair Jr., MD    Patient information was obtained from patient and ER records.     Inpatient consult to Orthopedic Surgery  Consult performed by: Donovan Isabel MD  Consult ordered by: Suzan Bullock DO        Subjective:     Principal Problem:Closed trimalleolar fracture of right ankle    Chief Complaint:   Chief Complaint   Patient presents with    Fall     Arrived via ems from home with c/o fall due to dizziness, + right ankle injury and deformity, + head injury, denies LOC, no blood thinner use        HPI: Cortes Schroeder is a 64 y.o. male with Pmhx of DM2 presenting with right ankle fracture after a glf 3 hours prior to exam. They were unable to bear weight after the injury. Patient denies any head trauma or LOC. They report multiple falls, 4x today. Patient denies numbness and tingling. Denies any other musculoskeletal pain or injuries. No known history of prior injury or surgery. Walks w/out assisted devices at baseline. Doesn't take any anticoagulation at baseline. Had water 4 hours prior to my exam    They deny IV drug use  They deny tobacco use.   They report alcohol use.   They deny immunosuppressant medications.  They deny chemotherapy.  They deny radiation therapy.         Past Medical History:   Diagnosis Date    Anxiety 12/18/2012    Back pain 12/18/2012    Cataract     Chronic pain syndrome 4/24/2016    Diabetes type 2, controlled 2/20/2016    Diabetic retinopathy     DM (diabetes mellitus) 12/18/2012    DM (diabetes mellitus), type 2, uncontrolled 11/16/2013    Essential hypertension 2/20/2016    Gastroesophageal reflux disease without esophagitis 2/20/2016    Hyperlipidemia 12/18/2012    Insomnia 8/7/2014    Neuropathy 11/16/2013       Past Surgical History:    Procedure Laterality Date    BACK SURGERY  2003    Lumbar Spine    CATARACT EXTRACTION      EPIDURAL STEROID INJECTION N/A 1/16/2019    Procedure: Injection, Steroid, Epidural Cervical;  Surgeon: Andrew Sinclair Jr., MD;  Location: [facility] ENDO;  Service: Pain Management;  Laterality: N/A;  Cervical Epidural Steroid Injection C7-T1    49943    Arrive @ 1240    EPIDURAL STEROID INJECTION N/A 2/20/2019    Procedure: Injection, Steroid, Epidural;  Surgeon: Andrew Sinclair Jr., MD;  Location: [facility] ENDO;  Service: Pain Management;  Laterality: N/A;  Lumbar Epidural Steroid Injection L4-5    00479    Arrive @ 1215 (requests latest time)    INJECTION, SPINE, LUMBOSACRAL, TRANSFORAMINAL APPROACH Bilateral 6/14/2024    Procedure: Bilateral L5 Transforaminal Epidural Steroid Injections;  Surgeon: Andrew Sinclair Jr., MD;  Location: [facility] PAIN MANAGEMENT;  Service: Pain Management;  Laterality: Bilateral;  @1300(given)  ASA last 6/8  Check BG  MD Sign.       Review of patient's allergies indicates:   Allergen Reactions    Invokana [canagliflozin] Anaphylaxis    Percocet [oxycodone-acetaminophen] Nausea Only and Hallucinations    Biaxin [clarithromycin]     Hydrocodone Other (See Comments)     Dizzy/nausea/hallucinations    Sulfa (sulfonamide antibiotics) Nausea Only and Rash       Current Facility-Administered Medications   Medication    propofol (DIPRIVAN) 10 mg/mL IVP     Current Outpatient Medications   Medication Sig    DULoxetine (CYMBALTA) 60 MG capsule Take 1 capsule (60 mg total) by mouth once daily.    gabapentin (NEURONTIN) 100 MG capsule Take 1 capsule (100 mg total) by mouth once daily.    HUMALOG U-100 INSULIN 100 unit/mL injection Use in insulin pump; max dose 150 units per day.    losartan (COZAAR) 50 MG tablet TAKE 1 TABLET(50 MG) BY MOUTH EVERY DAY    blood-glucose sensor (DEXCOM G6 SENSOR) Omaira Change sensor every 10 days    blood-glucose transmitter (DEXCOM G6 TRANSMITTER) Omaira Change transmitter  every 3 months    cinnamon bark (CINNAMON) 500 mg capsule Take 500 mg by mouth 2 (two) times a day.    emtricitabine-tenofovir 200-300 mg (TRUVADA) 200-300 mg Tab Take 1 tablet by mouth once daily.    fish oil-omega-3 fatty acids 300-1,000 mg capsule Take by mouth 2 (two) times daily.    gabapentin (NEURONTIN) 600 MG tablet Take 2 tablets (1,200 mg total) by mouth every evening.    insulin pump cartridge (OMNIPOD DASH 5 PACK POD) Crtg Apply new pod to skin every 1.5 days    insulin pump controller (OMNIPOD DASH PDM KIT MISC) by Misc.(Non-Drug; Combo Route) route.    L-CITRULLINE MISC by Misc.(Non-Drug; Combo Route) route Daily.    lancets Misc To check BG 4-7 times daily, to use with insurance preferred meter    magnesium oxide-Mg AA chelate (MG-PLUS-PROTEIN) 133 mg Tab Take 500 mg by mouth every evening.     nortriptyline (PAMELOR) 50 MG capsule Take 1 capsule (50 mg total) by mouth nightly.    OPW TEST CLAIM - DO NOT FILL Inject into the skin. OPW test claim. Do not fill.    rosuvastatin (CRESTOR) 5 MG tablet TAKE 1 TABLET(5 MG) BY MOUTH EVERY DAY    tiZANidine (ZANAFLEX) 4 MG tablet Take 2 tablets (8 mg total) by mouth nightly as needed (as needed for muscle spasms).    vitamin D 1000 units Tab Take 185 mg by mouth 2 (two) times daily.      Family History       Problem Relation (Age of Onset)    Alzheimer's disease Father    Cataracts Father    Heart attack Mother    Hypertension Father, Mother    Migraines Mother    Parkinsonism Father          Tobacco Use    Smoking status: Never    Smokeless tobacco: Never   Substance and Sexual Activity    Alcohol use: Yes     Alcohol/week: 1.0 standard drink of alcohol     Types: 1 Glasses of wine per week     Comment: occasionally    Drug use: No    Sexual activity: Not Currently     Partners: Male     Birth control/protection: Condom     Comment: 10/2/17      ROS  Constitutional: negative for fevers  Eyes: negative visual changes  ENT: negative for hearing  "loss  Respiratory: negative for dyspnea  Cardiovascular: negative for chest pain  Gastrointestinal: negative for abdominal pain  Genitourinary: negative for dysuria  Neurological: negative for headaches. Positive for falls  Behavioral/Psych: negative for hallucinations  Endocrine: negative for temperature intolerance      Objective:     Vital Signs (Most Recent):  Temp: 97.7 °F (36.5 °C) (07/18/24 1535)  Pulse: 77 (07/18/24 1535)  Resp: 16 (07/18/24 1608)  BP: 110/60 (intial bp 79/40) (07/18/24 1535)  SpO2: 97 % (92 on  RA) (07/18/24 1535) Vital Signs (24h Range):  Temp:  [97.7 °F (36.5 °C)] 97.7 °F (36.5 °C)  Pulse:  [77] 77  Resp:  [14-16] 16  SpO2:  [97 %] 97 %  BP: (110)/(60) 110/60     Weight: 92.1 kg (203 lb)  Height: 5' 9" (175.3 cm)  Body mass index is 29.98 kg/m².      Intake/Output Summary (Last 24 hours) at 7/18/2024 1724  Last data filed at 7/18/2024 1537  Gross per 24 hour   Intake 500 ml   Output --   Net 500 ml        Ortho/SPM Exam   Gen:  No acute distress, well-developed, well nourished.  CV:  Peripherally well-perfused. 2+ radial pulses, symmetric.  Respiratory:  Normal respiratory effort. No accessory muscle use.   Head/Neck:  Normocephalic.  Atraumatic. Sclera anicteric. TM. Neck supple.  Neuro: No FND. Awake. Alert. Oriented to person, place, time, and situation.  Abdomen: Soft, NTND.      MSK:  Right Upper Extremity  Inspection  - Skin intact throughout, no open wounds  - No swelling  - No ecchymosis, erythema, or signs of cellulitis  Palpation  - NonTTP throughout, no palpable abnormality   Range of motion  - AROM and PROM of the shoulder, elbow, wrist, and hand intact  Stability  - No evidence of joint dislocation or abnormal laxity   Neurovascular  - AIN/PIN/Radial/Median/Ulnar Nerves assessed in isolation without deficit  - Able to give thumbs up, make "OK" sign, cross IF/LF, abduct/adduct fingers, make fist  - SILT throughout  - Compartments soft  - Radial artery 2+  - Muscle tone " "normal    Left Upper Extremity  Inspection  - Skin intact throughout, no open wounds  - No swelling  - No ecchymosis, erythema, or signs of cellulitis  Palpation  - TTP at the wrist  Range of motion  - AROM and PROM of the shoulder, elbow, wrist, and hand intact  Stability  - No evidence of joint dislocation or abnormal laxity  Neurovascular  - AIN/PIN/Radial/Median/Ulnar Nerves assessed in isolation without deficit  - Able to give thumbs up, make "OK" sign, cross IF/LF, abduct/adduct fingers, make fist  - SILT throughout  - Compartments soft  - Radial artery 2+  - Muscle tone normal      Right Lower Extremity  Inspection  - Skin intact throughout, no open wounds. Tenting present at the medial ankle  - moderate ankle swelling  - No ecchymosis, erythema, or signs of cellulitis  Palpation  - TTP at the ankle  Range of motion  - AROM and PROM of the hip, knee intact  Neurovascular  - Fires TA/EHL/GSC/FHL  - Decreased sensation along dorsal and plantar foot, at baseline  - Compartments soft  - DP 2+   - Muscle tone normal    Left Lower Extremity  Inspection  - Skin intact throughout, no open wounds  - No swelling  - No ecchymosis, erythema, or signs of cellulitis  Palpation  - TTP at he ankle   Range of motion  - AROM and PROM of the hip, knee, ankle, and foot intact  Stability  - No evidence of joint dislocation or abnormal laxity,   Neurovascular  - TA/EHL/Gastroc/FHL assessed in isolation without deficit  - Decreased sensation along dorsal and plantar foot, at baseline  - Compartments soft  - DP 2+   - Negative Log roll  - Negative Stinchfield  - Muscle tone normal      Significant Labs: CBC:   Recent Labs   Lab 07/18/24  1617   WBC 13.75*   HGB 15.0   HCT 42.4        CMP:   Recent Labs   Lab 07/18/24  1617   *   K 4.0      CO2 23      BUN 35*   CREATININE 1.8*   CALCIUM 9.9   PROT 7.3   ALBUMIN 3.6   BILITOT 0.4   ALKPHOS 147*   AST 22   ALT 38   ANIONGAP 10     All pertinent labs within " the past 24 hours have been reviewed.    Significant Imaging: I have reviewed and interpreted all pertinent imaging results/findings.  Xr right ankle revealed Fracture dislocation with lateral displacement of the foot.  Assessment/Plan:     * Closed trimalleolar fracture of right ankle  Cortes Schroeder is a 64 y.o. male presenting with history of DM2 presents with right trimalleolar ankle fracture with skin tenting & non-displaced left distal radius fracture. No signs of ecchymosis or petechial over the ankle. He is neuro intact. He was closed reduced and splinted in the ED. Ankle was grossly unstable in a splint. He is admitted to  for evaluation of syncopal episodes. He is currently hemodynamically stable. Plan for ex-fix of right ankle today. Pt is currently NPO, booked, marked and consented. Non-displaced distal radius fracture was placed in a sugar-tong splint.      - NWB RLE, NWB LUE  - Agressive icing and elevation of right LE  - DVT Prophylaxis: SCDs at all times while in bed  - Pain control: multimodal pain management  - PT/OT: Eval & treat NWB RLE     Procedure Note: right ankle reduction  Patient was explained risks, benefits, and alternatives to treatment and verbalized consent to proceed. After patient confirmation,  20 cc of 1% lidocaine was used for hematoma block. Fracture was reduced under fluoroscopy. Short leg splint with stirrups was applied in typical fashion. Post-reduction films were performed and confirmed adequate reduction.      Thank you for your consult.     Donovan Isabel MD  Orthopedics  [facility] - Emergency Dept

## 2024-07-19 NOTE — TRANSFER OF CARE
"Anesthesia Transfer of Care Note    Patient: Cortes Schroeder    Procedure(s) Performed: Procedure(s) (LRB):  ORIF, FRACTURE, RADIUS, DISTAL - LEFT (Left)    Patient location: PACU    Anesthesia Type: general    Transport from OR: Transported from OR on 6-10 L/min O2 by face mask with adequate spontaneous ventilation    Post pain: adequate analgesia    Post assessment: no apparent anesthetic complications and tolerated procedure well    Post vital signs: stable    Level of consciousness: sedated    Nausea/Vomiting: no nausea/vomiting    Complications: none    Transfer of care protocol was followed      Last vitals: Visit Vitals  BP (!) 86/52 (BP Location: Right arm, Patient Position: Lying)   Pulse 78   Temp 36.3 °C (97.3 °F) (Temporal)   Resp 10   Ht 5' 5" (1.651 m)   Wt 92.1 kg (203 lb)   SpO2 98%   BMI 33.78 kg/m²     "

## 2024-07-19 NOTE — ED NOTES
Patient belongings, including patient wallet, keys, ring and one shoe given to orthopedic resident MD Maame.

## 2024-07-19 NOTE — TRANSFER OF CARE
"Anesthesia Transfer of Care Note    Patient: Cortes Schroeder    Procedure(s) Performed: Procedure(s) (LRB):  APPLICATION, EXTERNAL FIXATION DEVICE, FOR ANKLE FRACTURE (Right)  CLOSED REDUCTION, FRACTURE, ANKLE, TRIMALLEOLAR (Right)    Patient location: PACU    Anesthesia Type: general    Transport from OR: Transported from OR on 6-10 L/min O2 by face mask with adequate spontaneous ventilation    Post pain: adequate analgesia    Post assessment: no apparent anesthetic complications    Post vital signs: stable    Level of consciousness: awake    Nausea/Vomiting: no nausea/vomiting    Complications: none    Transfer of care protocol was followed      Last vitals: Visit Vitals  /62 (BP Location: Right arm, Patient Position: Lying)   Pulse 68   Temp 36.3 °C (97.3 °F) (Temporal)   Resp 17   Ht 5' 9" (1.753 m)   Wt 92.1 kg (203 lb)   SpO2 100%   BMI 29.98 kg/m²     "

## 2024-07-20 PROBLEM — E66.9 OBESITY: Status: ACTIVE | Noted: 2022-03-16

## 2024-07-20 LAB
ALBUMIN SERPL BCP-MCNC: 2.8 G/DL (ref 3.5–5.2)
ALP SERPL-CCNC: 112 U/L (ref 55–135)
ALT SERPL W/O P-5'-P-CCNC: 15 U/L (ref 10–44)
ANION GAP SERPL CALC-SCNC: 10 MMOL/L (ref 8–16)
ASCENDING AORTA: 3.07 CM
AST SERPL-CCNC: 15 U/L (ref 10–40)
AV INDEX (PROSTH): 0.99
AV MEAN GRADIENT: 4 MMHG
AV PEAK GRADIENT: 6 MMHG
AV VALVE AREA BY VELOCITY RATIO: 2.66 CM²
AV VALVE AREA: 2.89 CM²
AV VELOCITY RATIO: 0.91
BASOPHILS # BLD AUTO: 0.08 K/UL (ref 0–0.2)
BASOPHILS NFR BLD: 0.9 % (ref 0–1.9)
BILIRUB SERPL-MCNC: 0.4 MG/DL (ref 0.1–1)
BSA FOR ECHO PROCEDURE: 2.05 M2
BUN SERPL-MCNC: 21 MG/DL (ref 8–23)
CALCIUM SERPL-MCNC: 8.5 MG/DL (ref 8.7–10.5)
CHLORIDE SERPL-SCNC: 105 MMOL/L (ref 95–110)
CO2 SERPL-SCNC: 23 MMOL/L (ref 23–29)
CREAT SERPL-MCNC: 1.3 MG/DL (ref 0.5–1.4)
CV ECHO LV RWT: 0.58 CM
DIFFERENTIAL METHOD BLD: ABNORMAL
DOP CALC AO PEAK VEL: 1.23 M/S
DOP CALC AO VTI: 21.59 CM
DOP CALC LVOT AREA: 2.9 CM2
DOP CALC LVOT DIAMETER: 1.93 CM
DOP CALC LVOT PEAK VEL: 1.12 M/S
DOP CALC LVOT STROKE VOLUME: 62.46 CM3
DOP CALCLVOT PEAK VEL VTI: 21.36 CM
E WAVE DECELERATION TIME: 152.71 MSEC
E/A RATIO: 0.83
E/E' RATIO: 6.82 M/S
ECHO LV POSTERIOR WALL: 1.07 CM (ref 0.6–1.1)
EOSINOPHIL # BLD AUTO: 0.4 K/UL (ref 0–0.5)
EOSINOPHIL NFR BLD: 4.4 % (ref 0–8)
ERYTHROCYTE [DISTWIDTH] IN BLOOD BY AUTOMATED COUNT: 13.4 % (ref 11.5–14.5)
EST. GFR  (NO RACE VARIABLE): >60 ML/MIN/1.73 M^2
FRACTIONAL SHORTENING: 46 % (ref 28–44)
GLUCOSE SERPL-MCNC: 110 MG/DL (ref 70–110)
HCT VFR BLD AUTO: 38.6 % (ref 40–54)
HGB BLD-MCNC: 12.5 G/DL (ref 14–18)
IMM GRANULOCYTES # BLD AUTO: 0.03 K/UL (ref 0–0.04)
IMM GRANULOCYTES NFR BLD AUTO: 0.3 % (ref 0–0.5)
INTERVENTRICULAR SEPTUM: 1.01 CM (ref 0.6–1.1)
IVRT: 117.03 MSEC
LA MAJOR: 5.01 CM
LA MINOR: 5.12 CM
LA WIDTH: 3.65 CM
LEFT ATRIUM SIZE: 3.24 CM
LEFT ATRIUM VOLUME INDEX MOD: 22.6 ML/M2
LEFT ATRIUM VOLUME INDEX: 25.6 ML/M2
LEFT ATRIUM VOLUME MOD: 44.9 CM3
LEFT ATRIUM VOLUME: 50.91 CM3
LEFT INTERNAL DIMENSION IN SYSTOLE: 2.01 CM (ref 2.1–4)
LEFT VENTRICLE DIASTOLIC VOLUME INDEX: 29.06 ML/M2
LEFT VENTRICLE DIASTOLIC VOLUME: 57.83 ML
LEFT VENTRICLE MASS INDEX: 60 G/M2
LEFT VENTRICLE SYSTOLIC VOLUME INDEX: 6.5 ML/M2
LEFT VENTRICLE SYSTOLIC VOLUME: 12.92 ML
LEFT VENTRICULAR INTERNAL DIMENSION IN DIASTOLE: 3.69 CM (ref 3.5–6)
LEFT VENTRICULAR MASS: 118.62 G
LV LATERAL E/E' RATIO: 6.25 M/S
LV SEPTAL E/E' RATIO: 7.5 M/S
LYMPHOCYTES # BLD AUTO: 1.9 K/UL (ref 1–4.8)
LYMPHOCYTES NFR BLD: 20.5 % (ref 18–48)
MAGNESIUM SERPL-MCNC: 2.2 MG/DL (ref 1.6–2.6)
MCH RBC QN AUTO: 28.9 PG (ref 27–31)
MCHC RBC AUTO-ENTMCNC: 32.4 G/DL (ref 32–36)
MCV RBC AUTO: 89 FL (ref 82–98)
MONOCYTES # BLD AUTO: 0.8 K/UL (ref 0.3–1)
MONOCYTES NFR BLD: 8.8 % (ref 4–15)
MV PEAK A VEL: 0.9 M/S
MV PEAK E VEL: 0.75 M/S
MV STENOSIS PRESSURE HALF TIME: 44.29 MS
MV VALVE AREA P 1/2 METHOD: 4.97 CM2
NEUTROPHILS # BLD AUTO: 6 K/UL (ref 1.8–7.7)
NEUTROPHILS NFR BLD: 65.1 % (ref 38–73)
NRBC BLD-RTO: 0 /100 WBC
PHOSPHATE SERPL-MCNC: 3.6 MG/DL (ref 2.7–4.5)
PISA TR MAX VEL: 3 M/S
PLATELET # BLD AUTO: 234 K/UL (ref 150–450)
PMV BLD AUTO: 11 FL (ref 9.2–12.9)
POCT GLUCOSE: 122 MG/DL (ref 70–110)
POCT GLUCOSE: 132 MG/DL (ref 70–110)
POCT GLUCOSE: 92 MG/DL (ref 70–110)
POTASSIUM SERPL-SCNC: 4.2 MMOL/L (ref 3.5–5.1)
PROT SERPL-MCNC: 6 G/DL (ref 6–8.4)
RA MAJOR: 4.09 CM
RA PRESSURE ESTIMATED: 0 MMHG
RA WIDTH: 2.88 CM
RBC # BLD AUTO: 4.33 M/UL (ref 4.6–6.2)
RIGHT VENTRICLE DIASTOLIC BASEL DIMENSION: 2.7 CM
RV TB RVSP: 3 MMHG
SINUS: 3.25 CM
SODIUM SERPL-SCNC: 138 MMOL/L (ref 136–145)
STJ: 2.71 CM
TDI LATERAL: 0.12 M/S
TDI SEPTAL: 0.1 M/S
TDI: 0.11 M/S
TR MAX PG: 36 MMHG
TRICUSPID ANNULAR PLANE SYSTOLIC EXCURSION: 1.64 CM
TV REST PULMONARY ARTERY PRESSURE: 36 MMHG
WBC # BLD AUTO: 9.25 K/UL (ref 3.9–12.7)
Z-SCORE OF LEFT VENTRICULAR DIMENSION IN END DIASTOLE: -4.47
Z-SCORE OF LEFT VENTRICULAR DIMENSION IN END SYSTOLE: -4.49

## 2024-07-20 PROCEDURE — 83735 ASSAY OF MAGNESIUM: CPT | Performed by: HOSPITALIST

## 2024-07-20 PROCEDURE — 97165 OT EVAL LOW COMPLEX 30 MIN: CPT

## 2024-07-20 PROCEDURE — 36415 COLL VENOUS BLD VENIPUNCTURE: CPT | Performed by: HOSPITALIST

## 2024-07-20 PROCEDURE — 25000003 PHARM REV CODE 250

## 2024-07-20 PROCEDURE — 97112 NEUROMUSCULAR REEDUCATION: CPT

## 2024-07-20 PROCEDURE — 97535 SELF CARE MNGMENT TRAINING: CPT

## 2024-07-20 PROCEDURE — 25000003 PHARM REV CODE 250: Performed by: HOSPITALIST

## 2024-07-20 PROCEDURE — 84100 ASSAY OF PHOSPHORUS: CPT | Performed by: HOSPITALIST

## 2024-07-20 PROCEDURE — 27000221 HC OXYGEN, UP TO 24 HOURS

## 2024-07-20 PROCEDURE — 99232 SBSQ HOSP IP/OBS MODERATE 35: CPT | Mod: ,,, | Performed by: NURSE PRACTITIONER

## 2024-07-20 PROCEDURE — 97530 THERAPEUTIC ACTIVITIES: CPT

## 2024-07-20 PROCEDURE — 80053 COMPREHEN METABOLIC PANEL: CPT | Performed by: HOSPITALIST

## 2024-07-20 PROCEDURE — 85025 COMPLETE CBC W/AUTO DIFF WBC: CPT | Performed by: HOSPITALIST

## 2024-07-20 PROCEDURE — 21400001 HC TELEMETRY ROOM

## 2024-07-20 PROCEDURE — 94761 N-INVAS EAR/PLS OXIMETRY MLT: CPT

## 2024-07-20 PROCEDURE — 97162 PT EVAL MOD COMPLEX 30 MIN: CPT

## 2024-07-20 RX ADMIN — ASPIRIN 81 MG: 81 TABLET, COATED ORAL at 09:07

## 2024-07-20 RX ADMIN — CHOLECALCIFEROL TAB 25 MCG (1000 UNIT) 1000 UNITS: 25 TAB at 09:07

## 2024-07-20 RX ADMIN — Medication 1 CAPSULE: at 08:07

## 2024-07-20 RX ADMIN — ACETAMINOPHEN 650 MG: 325 TABLET ORAL at 08:07

## 2024-07-20 RX ADMIN — ASPIRIN 81 MG: 81 TABLET, COATED ORAL at 08:07

## 2024-07-20 RX ADMIN — SENNOSIDES AND DOCUSATE SODIUM 1 TABLET: 50; 8.6 TABLET ORAL at 09:07

## 2024-07-20 RX ADMIN — ACETAMINOPHEN 650 MG: 325 TABLET ORAL at 09:07

## 2024-07-20 RX ADMIN — POLYETHYLENE GLYCOL 3350 17 G: 17 POWDER, FOR SOLUTION ORAL at 09:07

## 2024-07-20 RX ADMIN — MORPHINE SULFATE 15 MG: 15 TABLET ORAL at 09:07

## 2024-07-20 RX ADMIN — Medication 200 MG: at 09:07

## 2024-07-20 RX ADMIN — ACETAMINOPHEN 650 MG: 325 TABLET ORAL at 02:07

## 2024-07-20 RX ADMIN — SENNOSIDES AND DOCUSATE SODIUM 1 TABLET: 50; 8.6 TABLET ORAL at 08:07

## 2024-07-20 RX ADMIN — ACETAMINOPHEN 650 MG: 325 TABLET ORAL at 04:07

## 2024-07-20 RX ADMIN — GABAPENTIN 600 MG: 300 CAPSULE ORAL at 09:07

## 2024-07-20 RX ADMIN — CHOLECALCIFEROL TAB 25 MCG (1000 UNIT) 1000 UNITS: 25 TAB at 08:07

## 2024-07-20 RX ADMIN — GABAPENTIN 1200 MG: 400 CAPSULE ORAL at 08:07

## 2024-07-20 RX ADMIN — METHOCARBAMOL 500 MG: 500 TABLET ORAL at 09:07

## 2024-07-20 RX ADMIN — Medication 1 CAPSULE: at 09:07

## 2024-07-20 RX ADMIN — CELECOXIB 200 MG: 200 CAPSULE ORAL at 09:07

## 2024-07-20 RX ADMIN — ATORVASTATIN CALCIUM 40 MG: 40 TABLET, FILM COATED ORAL at 09:07

## 2024-07-20 RX ADMIN — CYANOCOBALAMIN TAB 1000 MCG 1000 MCG: 1000 TAB at 09:07

## 2024-07-20 RX ADMIN — DULOXETINE HYDROCHLORIDE 60 MG: 60 CAPSULE, DELAYED RELEASE ORAL at 09:07

## 2024-07-20 RX ADMIN — NORTRIPTYLINE HYDROCHLORIDE 50 MG: 25 CAPSULE ORAL at 08:07

## 2024-07-20 RX ADMIN — EMTRICITABINE AND TENOFOVIR DISOPROXIL FUMARATE 1 TABLET: 200; 300 TABLET, FILM COATED ORAL at 09:07

## 2024-07-20 NOTE — SUBJECTIVE & OBJECTIVE
Interval history- doing well this morning, eating breakfast and he reports pain controlled so far this morning. Swelling improving in left fingers as fairly swollen yesterday post op but able to make a fist today and improving. Swellign in right ankle present but not too severe so far and no blisters seen and pin sites covered with gauze now. Elevated but positioned a little sideways in bed so helped to move blankets above leg a little more and will have nursing and therapy move him a little more straightened out in bed once they work with him today but has elevaetd and will need to make sure ice is on it as well later this afternoon after thearpy to help with swelling. Na improved to 138 after light IVF yesterday as wel as Cr improved to 1.3 so may have been an element of dehydration on admit since miprovements and no dizzinses so far since admit. Echo done this aM and pending read to ensure no valvular issues that could have been contributor and no reported ectopy on telemetry since admit. Endo following and home insulin pump supplies brought to hospital by [employer] yesterday and hooked up and has dexcom and nusring monitoring to make sure coordinates with accuchecks here and if so then can use dexcom further. He asked about mounjaro and discussed will need to hold while here as has risk of aspiration of stomach contents with anesthesia with it so will need to hold until all surgeries completed as still has 1 more on his ankle next week. Plan for placement but likely will need to stay here for surgery but will f/u with CM Monday to see options for this. Will likely need FMLA as will be non weight bearing for probably at least 2 months for the ankle so will give him information for this ([email or web address]) further so he can have employer start paperwork as suspect will be needed given expected extended time he will not be able to work given weight bearing status post op from 2 limb restrictions for  prolonged period expected.        Review of patient's allergies indicates:   Allergen Reactions    Invokana [canagliflozin] Anaphylaxis    Percocet [oxycodone-acetaminophen] Nausea Only and Hallucinations    Biaxin [clarithromycin]     Hydrocodone Other (See Comments)     Dizzy/nausea/hallucinations    Sulfa (sulfonamide antibiotics) Nausea Only and Rash       No current facility-administered medications on file prior to encounter.     Current Outpatient Medications on File Prior to Encounter   Medication Sig    aspirin (ECOTRIN) 81 MG EC tablet Take 81 mg by mouth once daily.    cinnamon bark (CINNAMON) 500 mg capsule Take 500 mg by mouth 2 (two) times a day.    cyanocobalamin (VITAMIN B-12) 1000 MCG tablet Take 1,000 mcg by mouth once daily.    DULoxetine (CYMBALTA) 60 MG capsule Take 1 capsule (60 mg total) by mouth once daily.    emtricitabine-tenofovir 200-300 mg (TRUVADA) 200-300 mg Tab Take 1 tablet by mouth once daily.    fish oil-omega-3 fatty acids 300-1,000 mg capsule Take 1 capsule by mouth 2 (two) times daily.    gabapentin (NEURONTIN) 600 MG tablet Take 2 tablets (1,200 mg total) by mouth every evening.    HUMALOG U-100 INSULIN 100 unit/mL injection Use in insulin pump; max dose 150 units per day.    insulin pump cartridge (OMNIPOD DASH 5 PACK POD) Crtg Apply new pod to skin every 1.5 days    insulin pump controller (OMNIPOD DASH PDM KIT MISC) by Misc.(Non-Drug; Combo Route) route.    L-CITRULLINE MISC by Misc.(Non-Drug; Combo Route) route Daily.    losartan (COZAAR) 50 MG tablet TAKE 1 TABLET(50 MG) BY MOUTH EVERY DAY (Patient taking differently: Take 50 mg by mouth once daily. TAKE 1 TABLET(50 MG) BY MOUTH EVERY DAY)    magnesium oxide-Mg AA chelate (MG-PLUS-PROTEIN) 133 mg Tab Take 500 mg by mouth every evening.     MOUNJARO 10 mg/0.5 mL PnIj Inject 10 mg into the skin once a week.    nortriptyline (PAMELOR) 50 MG capsule Take 1 capsule (50 mg total) by mouth nightly.    rosuvastatin (CRESTOR) 10 MG  tablet Take 10 mg by mouth every evening.    tiZANidine (ZANAFLEX) 4 MG tablet Take 2 tablets (8 mg total) by mouth nightly as needed (as needed for muscle spasms).    vitamin D 1000 units Tab Take 1,000 Units by mouth 2 (two) times daily.    blood-glucose sensor (DEXCOM G6 SENSOR) Omaira Change sensor every 10 days    blood-glucose transmitter (DEXCOM G6 TRANSMITTER) Omaira Change transmitter every 3 months    lancets Misc To check BG 4-7 times daily, to use with insurance preferred meter    methocarbamoL (ROBAXIN) 500 MG Tab Take 1,000 mg by mouth 4 (four) times daily.    [DISCONTINUED] insulin aspart U-100 (NOVOLOG U-100 INSULIN ASPART) 100 unit/mL injection Use in insulin pump. Max daily dose 150 units.     Family History       Problem Relation (Age of Onset)    Alzheimer's disease Father    Cataracts Father    Heart attack Mother    Hypertension Father, Mother    Migraines Mother    Parkinsonism Father          Tobacco Use    Smoking status: Never    Smokeless tobacco: Never   Substance and Sexual Activity    Alcohol use: Yes     Alcohol/week: 1.0 standard drink of alcohol     Types: 1 Glasses of wine per week     Comment: occasionally    Drug use: No    Sexual activity: Not Currently     Partners: Male     Birth control/protection: Condom     Comment: 10/2/17      Review of Systems   Constitutional:  Positive for activity change.   Respiratory: Negative.     Cardiovascular: Negative.    Gastrointestinal: Negative.      Objective:     Vital Signs (Most Recent):  Temp: 98.2 °F (36.8 °C) (07/20/24 0755)  Pulse: 86 (07/20/24 1054)  Resp: 18 (07/20/24 0900)  BP: 118/60 (07/20/24 1006)  SpO2: 95 % (07/20/24 0755) Vital Signs (24h Range):  Temp:  [97 °F (36.1 °C)-98.4 °F (36.9 °C)] 98.2 °F (36.8 °C)  Pulse:  [79-97] 86  Resp:  [10-20] 18  SpO2:  [92 %-100 %] 95 %  BP: ()/(50-77) 118/60     Weight: 92.1 kg (203 lb)  Body mass index is 33.78 kg/m².     Physical Exam  Vitals and nursing note reviewed.  "  Constitutional:       General: He is not in acute distress.     Comments: Pleasant.    HENT:      Head: Normocephalic and atraumatic.   Eyes:      General: No scleral icterus.  Cardiovascular:      Rate and Rhythm: Normal rate and regular rhythm.   Pulmonary:      Effort: Pulmonary effort is normal. No respiratory distress.      Breath sounds: No wheezing.      Comments: Limited anterior exam  Abdominal:      General: Bowel sounds are normal.      Palpations: Abdomen is soft.   Musculoskeletal:      Cervical back: Neck supple.      Left lower leg: No edema.      Comments: RLE in ex fix, elevated with pin sites covered with gauze    LUE in splint and sling.   Neurological:      Mental Status: He is alert.                Significant Labs: All pertinent labs within the past 24 hours have been reviewed.  ABGs: No results for input(s): "PH", "PCO2", "HCO3", "POCSATURATED", "BE", "TOTALHB", "COHB", "METHB", "O2HB", "POCFIO2", "PO2" in the last 48 hours.  CBC:   Recent Labs   Lab 07/18/24  1617 07/19/24  0230 07/20/24  0515   WBC 13.75* 11.17 9.25   HGB 15.0 13.8* 12.5*   HCT 42.4 39.9* 38.6*    242 234     CMP:   Recent Labs   Lab 07/18/24  1617 07/19/24  0230 07/20/24  0515   * 133* 138   K 4.0 4.5 4.2    98 105   CO2 23 24 23    236* 110   BUN 35* 30* 21   CREATININE 1.8* 1.6* 1.3   CALCIUM 9.9 8.7 8.5*   PROT 7.3 6.3 6.0   ALBUMIN 3.6 3.0* 2.8*   BILITOT 0.4 0.3 0.4   ALKPHOS 147* 122 112   AST 22 18 15   ALT 38 31 15   ANIONGAP 10 11 10     Cardiac Markers: No results for input(s): "CKMB", "MYOGLOBIN", "BNP", "TROPISTAT" in the last 48 hours.  Coagulation:   Recent Labs   Lab 07/18/24  1802   INR 1.0     Lactic Acid: No results for input(s): "LACTATE" in the last 48 hours.  Troponin:   Recent Labs   Lab 07/18/24  1617   TROPONINI 0.013     Urine Studies:   Recent Labs   Lab 07/19/24  0231   COLORU Yellow   APPEARANCEUA Clear   PHUR 6.0   SPECGRAV 1.020   PROTEINUA Trace*   GLUCUA 4+* "   KETONESU Trace*   BILIRUBINUA Negative   OCCULTUA Negative   NITRITE Negative   LEUKOCYTESUR Negative   RBCUA 0   WBCUA 1   BACTERIA Rare   SQUAMEPITHEL 0       Significant Imaging: I have reviewed all pertinent imaging results/findings within the past 24 hours.    CT ANKLE (INCLUDING HINDFOOT) WITHOUT CONTRAST RIGHT; CT 3D RENDERING WO INDEPENDENT WORKSTATION     CLINICAL HISTORY:  2mm cuts with 3d recons;; FX;     TECHNIQUE:  Axial images of the right ankle were obtained at 0.625 mm intervals without administration of IV contrast.  Coronal and sagittal reformatted images were reviewed.  3D reconstructed images were created on a separate workstation and reviewed.     COMPARISON:  Radiograph 07/18/2024     FINDINGS:  There is motion artifact.     Allowing for the above, there is a comminuted displaced fracture involving the distal aspect of the fibula noting several fracture fragments lie about the fracture plane.  There is comminuted fracture involving the distal aspect of the tibia, fracture planes extend to involve the medial malleolus and posterior aspect of the tibia noting several fracture fragments about the fracture plane.  The talus appears intact.  The calcaneus is intact.  The visualized portions of the kidney a forms and cuboid are intact.  The navicular is intact.  The proximal metatarsals are intact.  There is malalignment of the tibiotalar articulation noting lateral subluxation of the tibia in relationship to the talus.  There is edema about the fracture sites.  There is vascular calcification.  There are degenerative changes of the calcaneus.     Impression:     1. Fractures of the distal tibia and fibula noting tibiotalar subluxation as described.        Electronically signed by:Carlos Espinoza MD  Date:                                            07/18/2024  Time:                                           18:19           Exam Ended: 07/18/24 17:58 CDT Last Resulted: 07/18/24 18:19 CDT                  XR WRIST COMPLETE 3 VIEWS LEFT     CLINICAL HISTORY:  pain;     TECHNIQUE:  PA, lateral, and oblique views of the left wrist were performed.     COMPARISON:  None     FINDINGS:  Three views left wrist.     There is osteopenia.  There are degenerative changes of the wrist.  There is fracture involving the medial aspect of the distal radius, extending to the radiocarpal articulation with impaction.  There is edema about the dorsal aspect of the wrist.  The distal ulna appears intact.     Impression:     1. Distal radial fracture as described.        Electronically signed by:Carlos Espinoza MD  Date:                                            07/18/2024  Time:                                           18:48

## 2024-07-20 NOTE — PT/OT/SLP EVAL
"Occupational Therapy  Co- Evaluation and tx    Name: Cortes Schroeder  MRN: [id number]  Admitting Diagnosis: Closed trimalleolar fracture of right ankle  Recent Surgery: Procedure(s) (LRB):  ORIF, FRACTURE, RADIUS, DISTAL - LEFT (Left) 1 Day Post-Op    Recommendations:     Discharge Recommendations: High Intensity Therapy  Discharge Equipment Recommendations:  3-in-1 commode, other (see comments) (platform walker)  Barriers to discharge:  Other (Comment) (pt has two different housing options that have barriers.)    Assessment:     Cortes Schroeder is a 64 y.o. male with a medical diagnosis of Closed trimalleolar fracture of right ankle.  He presents with LUE brace under an arm sling, LLE external fixator. Performance deficits affecting function: weakness, impaired endurance, impaired self care skills, impaired functional mobility, gait instability, impaired balance, decreased lower extremity function, decreased upper extremity function, decreased coordination, pain, decreased ROM, orthopedic precautions.  Patient would benefit from continued skilled acute OT 4x/wk to improve functional mobility, increase independence with ADLs, and address established goals. Recommending high intensity therapy once medically appropriate for discharge to increase maximal independence, reduce burden of care, and ensure safety.     Rehab Prognosis: Good; patient would benefit from acute skilled OT services to address these deficits and reach maximum level of function.       Plan:     Patient to be seen 4 x/week to address the above listed problems via self-care/home management, therapeutic activities, therapeutic exercises  Plan of Care Expires: 08/17/24  Plan of Care Reviewed with: patient    Subjective   " I have a bad knee (L)"  Chief Complaint: Pt c/o left foot pain when standing  Patient/Family Comments/goals: To improve    Occupational Profile:  Living Environment: Pt has two living options: [facility]: has a roommate who is " handicapped but can assist, SSH, no AMY, WIS/no bench/bars and too small for a shower chair, multiple areas of the house are multi-level.  [place] [address]: 2.5 steps up, no rails, 3 steps to enter apt and B rails.  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 RN prior to session.  Patient found HOB elevated with peripheral IV, telemetry, oxygen, SCD, external fixator upon OT entry to room.    General Precautions: Standard, fall  Orthopedic Precautions: LUE non weight bearing, RLE non weight bearing  Braces: UE Sling, UE brace, TLSO  Respiratory Status: Nasal cannula, flow 2 L/min    Occupational Performance:    Bed Mobility:    Patient completed Rolling/Turning to Left with  minimum assistance and 2 persons  Patient completed Scooting/Bridging with SBA  to scoot and max x 2 to pull to HOB.  Patient completed Supine to Sit with minimum assistance and 2 persons  Patient completed Sit to Supine with minimum assistance and 2 persons    Functional Mobility/Transfers:  Patient completed Sit <> Stand Transfer with maximal assistance and of 2 persons  with  hand-held assist and platform walker   Functional Mobility: N/A    Activities of Daily Living:  Upper Body Dressing: moderate assistance to don gown EOB  Lower Body Dressing: total assistance don socks EOB    Cognitive/Visual Perceptual:  Cognitive/Psychosocial Skills:     -       Oriented to: Person, Place, Time, and Situation   -       Follows  Commands/attention:Follows multistep  commands  -       Communication: clear/fluent  -       Memory: No Deficits noted  -       Safety awareness/insight to disability: intact   -       Mood/Affect/Coping skills/emotional control: Cooperative  Visual/Perceptual:      -Intact WNL    Physical Exam:  Balance: -       static/dynamic sit WNL, Static stand WFL with platform walker  Postural examination/scapula alignment:    -       No postural abnormalities identified  Skin integrity: Visible skin intact and external fixator   Edema:  None noted  Sensation:    -       Intact  Motor Planning: -       WFL  Dominant hand: -       R  Upper Extremity Range of Motion:     -       Right Upper Extremity: WNL  -       Left Upper Extremity: limited to shld and elbow flex/ext  Upper Extremity Strength:    -       Right Upper Extremity: WNL  -       Left Upper Extremity: n/t    Strength:    -       Right Upper Extremity: WNL  -       Left Upper Extremity:  fair  Fine Motor Coordination:    -       Impaired   L hand/ R WNL  Gross motor coordination:   impaired due to L arm brace/sling  Neurological: -       WNL    AMPAC 6 Click ADL:  AMPAC Total Score: 15    Treatment & Education:  Role of OT and POC  Safety  ADL retraining  Functional mobility training  Discharge planning  Importance of EOB/OOB activity      Patient left  with 2 pillows and purple wedge to prop up in bed as bed would not function correctly and elevate HOB. Nurse notified to call to replace bed.  all lines intact, call button in reach, RN notified, and PT present    GOALS:   Multidisciplinary Problems       Occupational Therapy Goals          Problem: Occupational Therapy    Goal Priority Disciplines Outcome Interventions   Occupational Therapy Goal     OT, PT/OT Progressing    Description: Goals to be met by: 8/17     Patient will increase functional independence with ADLs by performing:    UE Dressing with Contact Guard Assistance.  LE Dressing with Minimal  Assistance.  Grooming while standing at sink with Contact Guard Assistance.  Toileting from bedside commode with Contact Guard Assistance for hygiene and clothing management.                          History:     Past Medical History:   Diagnosis Date    Anxiety 12/18/2012    Back pain 12/18/2012    Cataract     Chronic pain syndrome 4/24/2016    Diabetes type 2, controlled 2/20/2016    Diabetic retinopathy     DM (diabetes mellitus) 12/18/2012    DM (diabetes mellitus), type 2, uncontrolled 11/16/2013    Essential hypertension 2/20/2016    Gastroesophageal reflux disease without esophagitis 2/20/2016    Hyperlipidemia 12/18/2012    Insomnia 8/7/2014    Neuropathy 11/16/2013         Past Surgical History:   Procedure Laterality Date    BACK SURGERY  2003    Lumbar Spine    CATARACT EXTRACTION      EPIDURAL STEROID INJECTION N/A 1/16/2019    Procedure: Injection, Steroid, Epidural Cervical;  Surgeon: Andrew Sinclair Jr., MD;  Location: [facility] ENDO;  Service: Pain Management;  Laterality: N/A;  Cervical Epidural Steroid Injection C7-T1    67067    Arrive @ 1240    EPIDURAL STEROID INJECTION N/A 2/20/2019    Procedure: Injection, Steroid, Epidural;  Surgeon: Andrew Sinclair Jr., MD;  Location: [facility] ENDO;  Service: Pain Management;  Laterality: N/A;  Lumbar Epidural Steroid Injection L4-5    04677    Arrive @ 1215 (requests latest time)    INJECTION, SPINE, LUMBOSACRAL, TRANSFORAMINAL APPROACH Bilateral 6/14/2024    Procedure: Bilateral L5 Transforaminal Epidural Steroid Injections;  Surgeon: Andrew Sinclair Jr., MD;  Location: [facility] PAIN MANAGEMENT;  Service: Pain Management;  Laterality: Bilateral;  @1300(given)  ASA last 6/8  Check BG  MD Sign.       Time Tracking:     OT Date of Treatment: 07/20/24  OT Start Time: 0951  OT Stop Time: 1024  OT Total Time (min): 33 min    Billable Minutes:Evaluation 10  Self Care/Home Management 23    7/20/2024

## 2024-07-20 NOTE — PROGRESS NOTES
"[facility] - Surgery  Endocrinology  Progress Note    Admit Date: 2024     Reason for Consult: Management of T2DM, Hyperglycemia     Surgical Procedure and Date:  24   ORIF, FRACTURE, RADIUS, DISTAL - LEFT (Left)      Diabetes diagnosis year:     Home Diabetes Medications:    Humalog via OmniPod insulin pump  Mounjaro 10 mg weekly     Current pump settings:  Basal 12 am to 2.3 and 6 am to 1.55  ICR to 3 at 12 am, 2 at 4 pm  ISF to 1:20   AIT 3 hrs  Target 110-120       Patient had anaphylaxis reaction to SGLT2 inhibitors specifically Invokana        Lab Results   Component Value Date    LABA1C 10.8 (H) 08/15/2016    HGBA1C 9.9 (H) 2024       How often checking glucose at home? Dexcom G6   BG readings on regimen: variable 40s-300s (mostly on high side)   Hypoglycemia on the regimen?  Yes  Missed doses on regimen?  No    Diabetes Complications include:     Hyperglycemia and Diabetic peripheral neuropathy       Complicating diabetes co morbidities:   HLD, HTN, Obesity       HPI:   Patient is a 64 y.o. male with a diagnosis of anxiety, HLD, HTN, and T2DM who presents to the ED after a fall. Imaging shows right trimalleolar ankle fracture with skin tenting & non-displaced left distal radius fracture. He now presents for the above procedure(s). Patient sees Pepe Meade DO for insulin pump management. Last seen on . Endocrinology consulted for management of T2DM.       Interval HPI:   Overnight events: Remains in 541. POD 1. BG well controlled on home insulin pump. Diet diabetic  Calorie    Eatin%  Nausea: No  Hypoglycemia and intervention: No  Fever: No  TPN and/or TF: No  If yes, type of TF/TPN and rate: n/a    /60   Pulse 86   Temp 98.2 °F (36.8 °C) (Oral)   Resp 18   Ht 5' 5" (1.651 m)   Wt 92.1 kg (203 lb)   SpO2 95%   BMI 33.78 kg/m²     Labs Reviewed and Include    Recent Labs   Lab 24  0515      CALCIUM 8.5*   ALBUMIN 2.8*   PROT 6.0      K 4.2   CO2 " 23      BUN 21   CREATININE 1.3   ALKPHOS 112   ALT 15   AST 15   BILITOT 0.4     Lab Results   Component Value Date    WBC 9.25 07/20/2024    HGB 12.5 (L) 07/20/2024    HCT 38.6 (L) 07/20/2024    MCV 89 07/20/2024     07/20/2024     Recent Labs   Lab 07/18/24  1617   TSH 6.243*   FREET4 0.98     Lab Results   Component Value Date    HGBA1C 9.9 (H) 07/18/2024       Nutritional status:   Body mass index is 33.78 kg/m².  Lab Results   Component Value Date    ALBUMIN 2.8 (L) 07/20/2024    ALBUMIN 3.0 (L) 07/19/2024    ALBUMIN 3.6 07/18/2024     Lab Results   Component Value Date    PREALBUMIN 13 (L) 07/18/2024       Estimated Creatinine Clearance: 60.6 mL/min (based on SCr of 1.3 mg/dL).    Accu-Checks  Recent Labs     07/18/24  2141 07/19/24  0212 07/19/24  0553 07/19/24  0925 07/19/24  1431 07/19/24  1535 07/20/24  0750   POCTGLUCOSE 190* 252* 224* 171* 264* 249* 122*       Current Medications and/or Treatments Impacting Glycemic Control  Immunotherapy:    Immunosuppressants       None          Steroids:   Hormones (From admission, onward)      Start     Stop Route Frequency Ordered    07/19/24 0145  melatonin tablet 6 mg         -- Oral Nightly PRN 07/19/24 0048          Pressors:    Autonomic Drugs (From admission, onward)      None          Hyperglycemia/Diabetes Medications:   Antihyperglycemics (From admission, onward)      Start     Stop Route Frequency Ordered    07/19/24 1645  insulin lispro insulin pump from home 50 Units        Question Answer Comment   Target number 120    Basal Rate #1 2.3    Basal rate #1 time 3367-2010    Basal Rate #2 1.55    Basal rate #2 time 7314-8046        -- SubQ Continuous 07/19/24 1534    07/19/24 1632  insulin lispro insulin pump from home 1-20 Units        Question Answer Comment   Target number 120    Carbohydrate coverage #1 1:2    Carbohydrate coverage #1 time 5146-7414    Carbohydrate coverage #2 1:3    Carbohydrate coverage #2 time 4409-1807    Sensitivity  #1 1:20    Sensitivity #1 time 2857-1204        -- SubQ As needed (PRN) 07/19/24 8120            ASSESSMENT and PLAN    Endocrine  Insulin pump status  Insulin Pump:  Patient has the ability and wherewithal to manage the pump.  Order has been placed to notify nurse of patient using own pump. (Order Set: Adult and Pediatric Home Insulin Pump)     Humalog via OmniPod insulin pump  Current pump settings:  Basal 12 am to 2.3 and 6 am to 1.55  ICR to 3 at 12 am, 2 at 4 pm  ISF to 1:20   AIT 3 hrs  Target 110-120      Pump type:    Change infusion set: Every 1-1.5 days (7/19/24)  Change insertion site: with change of infusion set. (7/19/24)   Location of insertion site: Abdomen  State of insertion site: appears clean    Morbid obesity  Body mass index is 33.78 kg/m².  May increase insulin resistance.         Uncontrolled type 2 diabetes mellitus with hyperglycemia  BG goal 140-180    Continue home insulin pump (see below for settings)   BG monitoring ac/hs/0200    In the event of pump malfunction- notify endocrine for SQ insulin orders     ** Please call Endocrine for any BG related issues **        Orthopedic  * Closed trimalleolar fracture of right ankle  Managed per primary team  Optimize BG control            Meg Penaloza NP  Endocrinology  [facility] - Surgery

## 2024-07-20 NOTE — PLAN OF CARE
PT evaluation complete - see note for details. POC and goals established.    Problem: Physical Therapy  Goal: Physical Therapy Goal  Description: Goals to be met by: 24     Patient will increase functional independence with mobility by performin. Supine to sit with Contact Guard Assistance  2. Sit to supine with Contact Guard Assistance  3. Sit to stand transfer with Minimal Assistance with L platform RW  4. Bed to chair transfer with Minimal Assistance using LRAD  5. Gait  x 15 feet with Minimal Assistance using L platform RW.     DME Justifications (see above for complete DME recommendations)    Bedside Commode with drop arm- Patient has a mobility limitation that significantly impairs their ability to participate in one or more mobility related activities of daily living, including toileting. This deficit can be resolved by using a bedside commode. Patient demonstrates mobility limitations that will cause them to be confined to one room at home without bathroom access for up to 30 days. Using a bedside commode will greatly improve the patient's ability to participate in MRADLs.     Rolling Walker with L platform attachment- Patient demonstrates a mobility limitation that significantly impairs their ability to participate in one or more mobility related activities of daily living. Patient's mobility limitation cannot be sufficiently resolved with the use of a cane, but can be sufficiently resolved with the use of a rolling walker.The use of a rolling walker will considerably improve their ability to participate in MRADLs. Patient will use the walker on a regular basis at home.      Wheelchair with R elevating leg rest-  Patient has a mobility limitation that significantly impairs their ability to participate in one or more mobility related activities of daily living in customary locations in the home. The mobility limitation cannot be sufficiently resolved by the use of a cane or walker. The use of a  manual wheelchair will greatly improve the patient's ability to participate in MRADLs. The patient will use the wheelchair on a regular basis at home. They have expressed their willingness to use a manual wheelchair in the home, and have a caregiver who is available and willing to assist with the wheelchair if needed.     Outcome: Progressing     7/20/2024     unkn

## 2024-07-20 NOTE — SUBJECTIVE & OBJECTIVE
Principal Problem:Closed trimalleolar fracture of right ankle    Principal Orthopedic Problem: Right ankle trimal & Left distal radius fracture s/p Right ankle ex-fix 07/18, L DR ORIF 07/19    Interval History: Pt seen and examined at bedside. JACKIE HAYES. Reported mild ankle pain. Reports improving sensation and motor function to his left fingers. Denies fevers or chill.       Review of patient's allergies indicates:   Allergen Reactions    Invokana [canagliflozin] Anaphylaxis    Percocet [oxycodone-acetaminophen] Nausea Only and Hallucinations    Biaxin [clarithromycin]     Hydrocodone Other (See Comments)     Dizzy/nausea/hallucinations    Sulfa (sulfonamide antibiotics) Nausea Only and Rash       Current Facility-Administered Medications   Medication    0.9%  NaCl infusion    acetaminophen tablet 650 mg    acetaminophen tablet 650 mg    aluminum-magnesium hydroxide-simethicone 200-200-20 mg/5 mL suspension 30 mL    aspirin EC tablet 81 mg    atorvastatin tablet 40 mg    celecoxib capsule 200 mg    cyanocobalamin tablet 1,000 mcg    dextrose 10% bolus 125 mL 125 mL    dextrose 10% bolus 125 mL 125 mL    dextrose 10% bolus 250 mL 250 mL    dextrose 10% bolus 250 mL 250 mL    DULoxetine DR capsule 60 mg    emtricitabine-tenofovir 200-300 mg per tablet 1 tablet    gabapentin capsule 600 mg    And    gabapentin capsule 1,200 mg    glucagon (human recombinant) injection 1 mg    glucose chewable tablet 16 g    glucose chewable tablet 24 g    insulin lispro insulin pump from home 1-20 Units    insulin lispro insulin pump from home 50 Units    magnesium oxide split tablet 200 mg    melatonin tablet 6 mg    methocarbamoL tablet 500 mg    morphine tablet 15 mg    naloxone 0.4 mg/mL injection 0.02 mg    nortriptyline capsule 50 mg    omega 3-dha-epa-fish oil capsule 1 capsule    ondansetron injection 4 mg    polyethylene glycol packet 17 g    prochlorperazine injection Soln 5 mg    senna-docusate 8.6-50 mg per tablet 1  "tablet    sodium chloride 0.9% flush 10 mL    vitamin D 1000 units tablet 1,000 Units     Objective:     Vital Signs (Most Recent):  Temp: 98 °F (36.7 °C) (07/20/24 0349)  Pulse: 83 (07/20/24 0349)  Resp: 17 (07/20/24 0349)  BP: (!) 118/57 (07/20/24 0349)  SpO2: (!) 93 % (07/20/24 0413) Vital Signs (24h Range):  Temp:  [97 °F (36.1 °C)-98.4 °F (36.9 °C)] 98 °F (36.7 °C)  Pulse:  [78-97] 83  Resp:  [10-20] 17  SpO2:  [89 %-100 %] 93 %  BP: ()/(50-77) 118/57     Weight: 92.1 kg (203 lb)  Height: 5' 5" (165.1 cm)  Body mass index is 33.78 kg/m².      Intake/Output Summary (Last 24 hours) at 7/20/2024 0628  Last data filed at 7/20/2024 0029  Gross per 24 hour   Intake 1550 ml   Output 1300 ml   Net 250 ml        Ortho/SPM Exam     AAOx4  NAD  Reg rate  No increased WOB    RLE    Ex-fix in place. Pin sites covered in gauze  Moderate swelling present  Compartments soft/compressible  SILT throughout  Fires TA/EHL/Gastroc/FHL   DP pulse 2+. Brisk cap refill      LUE    Arm placed in a sling  Compartments soft/compressible  Reduced sensation to light touch.  Able to wiggle fingers but unable to make ok sign, thumb up or cross fingers. Likely still recovering from regional block.  Radial pulse 2+. Brisk cap refill          Significant Labs: All pertinent labs within the past 24 hours have been reviewed.    Significant Imaging: I have reviewed and interpreted all pertinent imaging results/findings.  "

## 2024-07-20 NOTE — PT/OT/SLP EVAL
Physical Therapy Co-Evaluation with OT and Treatment     Patient Name:  Cortes Schroeder  MRN: [id number]    Admit Date: 7/18/2024  Admitting Diagnosis:  Closed trimalleolar fracture of right ankle  Length of Stay: 0 days  Recent Surgery: Procedure(s) (LRB):  ORIF, FRACTURE, RADIUS, DISTAL - LEFT (Left) 1 Day Post-Op    Recommendations:     Discharge Recommendations: high intensity therapy  Equipment recommendations:  drop arm BSC, RW with L platform, wheelchair with R elevating leg rest  Barriers to discharge: Inaccessible home, Decreased caregiver support available , Increased level of skilled assistance required, and Fall risk     Assessment:     Cortes Schroeder is a 64 y.o. [place] [facility] [place] with medical diagnosis of Closed trimalleolar fracture of right ankle. Pt presents with weakness, impaired endurance, impaired self care skills, impaired functional mobility, gait instability, impaired balance, decreased coordination, decreased upper extremity function, decreased lower extremity function, pain, decreased ROM, impaired coordination, orthopedic precautions. These deficits effect their roles and responsibilities in which they were able to complete prior to admit.     Pt is agreeable to therapy evaluation and session. PTA, pt was (I) with ambulation and ADLs. Pt states that a few weeks prior to fall leading to current hospitalization, he fell and injured his L knee so he anticipates decreased WB tolerance through LLE in standing. NWB RLE (ex fix) and NWB L UE (distal radius ORIF) but cleared to WB through elbow for L platform RW use and full ROM at elbow. Pt has decreased caregiver support and is unable to safely access/navigate both living options d/t current deficits.    Pt requires assist for bed mobility. Able to scoot forward with increased time. Trialed 1 stand with L platform RW requiring significant assist x2 therapists. PT propped foot under R foot to ensure NWB. Pt tolerated stand for ~30  seconds. Returned to supine at end of session with RLE elevated. Will continue to progress pt as tolerated.    Cortes Schroeder would benefit from acute PT intervention to improve quality of life, focus on recovery of impairments, provide patient/caregiver education, reduce fall risk, and maximize (I) and safety with functional mobility. Once medically stable, recommending pt discharge to high intensity therapy. Patient presents with good participation and motivation to return to prior level of function with high intensity therapy.  The patient demonstrates appropriate endurance to participate in up to 3 hours or 15hrs of combined therapy post acute. .      Rehab Prognosis: Good    Plan:     During this hospitalization, patient to be seen 4 x/week to address the identified rehab impairments via gait training, therapeutic activities, therapeutic exercises, neuromuscular re-education and progress towards stated goals.     Plan of Care Expires:  08/19/24  Plan of Care reviewed with: patient    This plan of care has been discussed with the patient/caregiver, who was included in its development and is in agreement with the identified goals and treatment plan.     Subjective     Communicated with RN prior to session.  Patient found supine upon PT entry to room, agreeable to evaluation. Pt alone during session.    Chief Complaint: RLE pain    Patient/Family Comments/goals: motivated to work with therapy    Pain/Comfort:  Pain Rating 1: 6/10  Location - Side 1: Right  Location - Orientation 1: lower  Location 1: leg  Pain Addressed 1: Reposition, Distraction, Cessation of Activity  Pain Rating Post-Intervention 1:  (pt did not rate)    Patients cultural, spiritual, Jew conflicts given the current situation: None identified     Patient History: information obtained from pt   [facility]:  Multi-level home (biggest step is 8 inches, smallest is 2 inches) with 0 AMY. Lives with roommate (able to provide assist as  "needed per patient). WIS ("too small for a chair")     [facility]:  First floor with 2 steps (no HR) to initial door + 3 steps (narda HR, can't reach both) to front door. T/S    Works: telecommunications director for [employer]  Drives:  PLOF: (I) with ambulation and ADLs  Drives: yes  Assist at d/c: roommate in [place]; no assist in [place]  Falls: 2 in past 90 days  Objective:   OT present for coeval due to pt's multiple medical comorbidities and functional/cognition deficits requiring two skilled therapists to appropriately progress pt's musculoskeletal strength, neuromuscular control, and endurance while taking into consideration medical acuity and pt safety.    Patient found with: telemetry, external fixator, oxygen    Recent Surgery: Procedure(s) (LRB):  ORIF, FRACTURE, RADIUS, DISTAL - LEFT (Left) 1 Day Post-Op  General Precautions: Standard, fall   Orthopedic Precautions:LUE non weight bearing, RLE non weight bearing (LUE ROMAT; LUE WBAT thru elbow for platform RW)   Braces: UE Sling, UE brace   Oxygen Device: nasal cannula 2L      Exams:    Cognition:  Oriented X 4  Command following: Follows multistep verbal commands  Communication: clear/fluent    Sensation:   Light touch sensation: intact LLE    Gross Motor Coordination: No deficits noted during functional mobility tasks     Edema/Skin Integrity: Moderate RLE swelling; Visible skin intact    Postural examination/scapula alignment: no deficits noted    Lower Extremity Range of Motion:  Right Lower Extremity: AROM Deficits: knee ext, hip flexion *d/t pain*  Left Lower Extremity: WFL    Lower Extremity Strength:  Right Lower Extremity:  not assessed  Left Lower Extremity: Deficits: 3+/5 knee ext; 3+/5 knee flex; 3+/5 hip flexion    Functional Mobility:    Bed Mobility:  Scooting with stand by assistance  Increased time  Supine > Sit with minimum assistancex2  Sit > Supine with minimum assistancex2    Transfers:   Sit <> Stand Transfer: Maximum " Assistancex2 x 1 trials from eob with L platform RW AD   Unable to tolerate >30 seconds d/t L knee pain/weakness  Bed <> Chair: deferred d/t poor standing tolerance             Gait:  Deferred d/t poor standing tolerance    Balance:  Dynamic Sitting: GOOD: Maintains balance through MODERATE excursions of active trunk movement  Standing:  Static: 0: Needs MAXIMAL assist to maintain    Dynamic: 0: N/A    Outcome Measure: AM-PAC 6 CLICK MOBILITY  Total Score:12     Patient/Caregiver Education:     Therapist educated pt/caregiver regarding:   PT POC and goals for therapy   Safety with mobility and fall risk   Instruction on use of call button and importance of calling nursing staff for assistance with mobility   Time provided for therapeutic counseling and discussion of current health disposition. All questions answered to satisfaction, within scope of PT practice     Patient/caregiver able to verbalize understanding and expressed no further questions this visit; will follow-up with pt/caregiver during current admit for additional questions/concerns within scope of practice.     White board updated.     Patient left supine with all lines intact and call button in reach.    GOALS:   Multidisciplinary Problems       Physical Therapy Goals          Problem: Physical Therapy    Goal Priority Disciplines Outcome Goal Variances Interventions   Physical Therapy Goal     PT, PT/OT Progressing     Description: Goals to be met by: 24     Patient will increase functional independence with mobility by performin. Supine to sit with Contact Guard Assistance  2. Sit to supine with Contact Guard Assistance  3. Sit to stand transfer with Minimal Assistance with L platform RW  4. Bed to chair transfer with Minimal Assistance using LRAD  5. Gait  x 15 feet with Minimal Assistance using L platform RW.     DME Justifications (see above for complete DME recommendations)    Bedside Commode with drop arm- Patient has a mobility  limitation that significantly impairs their ability to participate in one or more mobility related activities of daily living, including toileting. This deficit can be resolved by using a bedside commode. Patient demonstrates mobility limitations that will cause them to be confined to one room at home without bathroom access for up to 30 days. Using a bedside commode will greatly improve the patient's ability to participate in MRADLs.     Rolling Walker with L platform attachment- Patient demonstrates a mobility limitation that significantly impairs their ability to participate in one or more mobility related activities of daily living. Patient's mobility limitation cannot be sufficiently resolved with the use of a cane, but can be sufficiently resolved with the use of a rolling walker.The use of a rolling walker will considerably improve their ability to participate in MRADLs. Patient will use the walker on a regular basis at home.      Wheelchair with R elevating leg rest-  Patient has a mobility limitation that significantly impairs their ability to participate in one or more mobility related activities of daily living in customary locations in the home. The mobility limitation cannot be sufficiently resolved by the use of a cane or walker. The use of a manual wheelchair will greatly improve the patient's ability to participate in MRADLs. The patient will use the wheelchair on a regular basis at home. They have expressed their willingness to use a manual wheelchair in the home, and have a caregiver who is available and willing to assist with the wheelchair if needed.                              History:     Past Medical History:   Diagnosis Date    Anxiety 12/18/2012    Back pain 12/18/2012    Cataract     Chronic pain syndrome 4/24/2016    Diabetes type 2, controlled 2/20/2016    Diabetic retinopathy     DM (diabetes mellitus) 12/18/2012    DM (diabetes mellitus), type 2, uncontrolled 11/16/2013    Essential  hypertension 2/20/2016    Gastroesophageal reflux disease without esophagitis 2/20/2016    Hyperlipidemia 12/18/2012    Insomnia 8/7/2014    Neuropathy 11/16/2013       Past Surgical History:   Procedure Laterality Date    BACK SURGERY  2003    Lumbar Spine    CATARACT EXTRACTION      EPIDURAL STEROID INJECTION N/A 1/16/2019    Procedure: Injection, Steroid, Epidural Cervical;  Surgeon: Andrew Sinclair Jr., MD;  Location: [facility] ENDO;  Service: Pain Management;  Laterality: N/A;  Cervical Epidural Steroid Injection C7-T1    30675    Arrive @ 1240    EPIDURAL STEROID INJECTION N/A 2/20/2019    Procedure: Injection, Steroid, Epidural;  Surgeon: Andrew Sinclair Jr., MD;  Location: [facility] ENDO;  Service: Pain Management;  Laterality: N/A;  Lumbar Epidural Steroid Injection L4-5    29044    Arrive @ 1215 (requests latest time)    INJECTION, SPINE, LUMBOSACRAL, TRANSFORAMINAL APPROACH Bilateral 6/14/2024    Procedure: Bilateral L5 Transforaminal Epidural Steroid Injections;  Surgeon: Andrew Sinclair Jr., MD;  Location: [facility] PAIN MANAGEMENT;  Service: Pain Management;  Laterality: Bilateral;  @1300(given)  ASA last 6/8  Check BG  MD Sign.       Time Tracking:     PT Received On: 07/20/24  PT Start Time: 0951     PT Stop Time: 1024  PT Total Time (min): 33 min     Billable Minutes: Evaluation 10, Therapeutic Activity 15, and Neuromuscular Re-education 8    07/20/2024

## 2024-07-20 NOTE — CONSULTS
Nutrition-Related Diabetes Education      Time Spent: 10 minutes    Learners: Patient    Current HbA1c: 9.9    Is patient aware of their A1c and their goal A1c? Yes    Nutrition Education with handouts: MyPlate, CHO counting     Comments: Pt familiar with diabetic diet. Provided and explained handout detailing sources of carbohydrates, appropriate serving sizes, and the plate method for meal planning. Pt voiced understanding. All questions and concerns answered.    Barriers to Learning: None identified     Pt tolerating diet, consuming breakfast during visit - reports good appetite PTA/currently & UBW of 200#. Pt appears nourished w/ no indicators of malnutrition.     Follow up: Yes    Please consult as needed.    Thank you!  Aimee MS, RD, LDN

## 2024-07-20 NOTE — PROGRESS NOTES
[facility]  Orthopedics  Progress Note    Patient Name: Cortes Schroeder  MRN: [id number]  Admission Date: 7/18/2024  Hospital Length of Stay: 0 days  Attending Provider: Maria Del Rosario Medina MD  Primary Care Provider: Andrew Sinclair Jr., MD  Follow-up For: Procedure(s) (LRB):  ORIF, FRACTURE, RADIUS, DISTAL - LEFT (Left)    Post-Operative Day: 1 Day Post-Op  Subjective:     Principal Problem:Closed trimalleolar fracture of right ankle    Principal Orthopedic Problem: Right ankle trimal & Left distal radius fracture s/p Right ankle ex-fix 07/18, L DR ORIF 07/19    Interval History: Pt seen and examined at bedside. VSSMark  ABIGALI. Reported mild ankle pain. Reports improving sensation and motor function to his left fingers. Denies fevers or chill.       Review of patient's allergies indicates:   Allergen Reactions    Invokana [canagliflozin] Anaphylaxis    Percocet [oxycodone-acetaminophen] Nausea Only and Hallucinations    Biaxin [clarithromycin]     Hydrocodone Other (See Comments)     Dizzy/nausea/hallucinations    Sulfa (sulfonamide antibiotics) Nausea Only and Rash       Current Facility-Administered Medications   Medication    0.9%  NaCl infusion    acetaminophen tablet 650 mg    acetaminophen tablet 650 mg    aluminum-magnesium hydroxide-simethicone 200-200-20 mg/5 mL suspension 30 mL    aspirin EC tablet 81 mg    atorvastatin tablet 40 mg    celecoxib capsule 200 mg    cyanocobalamin tablet 1,000 mcg    dextrose 10% bolus 125 mL 125 mL    dextrose 10% bolus 125 mL 125 mL    dextrose 10% bolus 250 mL 250 mL    dextrose 10% bolus 250 mL 250 mL    DULoxetine DR capsule 60 mg    emtricitabine-tenofovir 200-300 mg per tablet 1 tablet    gabapentin capsule 600 mg    And    gabapentin capsule 1,200 mg    glucagon (human recombinant) injection 1 mg    glucose chewable tablet 16 g    glucose chewable tablet 24 g    insulin lispro insulin pump from home 1-20 Units    insulin lispro insulin pump from home 50  "Units    magnesium oxide split tablet 200 mg    melatonin tablet 6 mg    methocarbamoL tablet 500 mg    morphine tablet 15 mg    naloxone 0.4 mg/mL injection 0.02 mg    nortriptyline capsule 50 mg    omega 3-dha-epa-fish oil capsule 1 capsule    ondansetron injection 4 mg    polyethylene glycol packet 17 g    prochlorperazine injection Soln 5 mg    senna-docusate 8.6-50 mg per tablet 1 tablet    sodium chloride 0.9% flush 10 mL    vitamin D 1000 units tablet 1,000 Units     Objective:     Vital Signs (Most Recent):  Temp: 98 °F (36.7 °C) (07/20/24 0349)  Pulse: 83 (07/20/24 0349)  Resp: 17 (07/20/24 0349)  BP: (!) 118/57 (07/20/24 0349)  SpO2: (!) 93 % (07/20/24 0413) Vital Signs (24h Range):  Temp:  [97 °F (36.1 °C)-98.4 °F (36.9 °C)] 98 °F (36.7 °C)  Pulse:  [78-97] 83  Resp:  [10-20] 17  SpO2:  [89 %-100 %] 93 %  BP: ()/(50-77) 118/57     Weight: 92.1 kg (203 lb)  Height: 5' 5" (165.1 cm)  Body mass index is 33.78 kg/m².      Intake/Output Summary (Last 24 hours) at 7/20/2024 0628  Last data filed at 7/20/2024 0029  Gross per 24 hour   Intake 1550 ml   Output 1300 ml   Net 250 ml        Ortho/SPM Exam     AAOx4  NAD  Reg rate  No increased WOB    RLE    Ex-fix in place. Pin sites covered in gauze  Moderate swelling present  Compartments soft/compressible  SILT throughout  Fires TA/EHL/Gastroc/FHL   DP pulse 2+. Brisk cap refill      LUE    Arm placed in a sling  Compartments soft/compressible  Reduced sensation to light touch.  Able to wiggle fingers but unable to make ok sign, thumb up or cross fingers. Likely still recovering from regional block.  Radial pulse 2+. Brisk cap refill          Significant Labs: All pertinent labs within the past 24 hours have been reviewed.    Significant Imaging: I have reviewed and interpreted all pertinent imaging results/findings.  Assessment/Plan:     * Closed trimalleolar fracture of right ankle  Cortes Schroeder is a 64 y.o. male presenting with history of DM2 presents " with right trimalleolar ankle fracture with skin tenting & non-displaced left distal radius fracture. No signs of ecchymosis or petechial over the ankle. He is neuro intact. He was closed reduced and splinted in the ED. Ankle was grossly unstable in a splint. He is admitted to  for evaluation of syncopal episodes. He is currently hemodynamically stable. He is s/p right ankle ex-fix on 07/19, L distal radius 07/19. Plan for definitive fixation when soft tissue is amenable to surgery.     - NWB RLE. LUE weightbearing at the elbow. Full ROM LUE  - DVT Prophylaxis: SCDs at all times while in bed. ASA.  - Pain control: multimodal pain management  - PT/OT: Eval & treat NWB RLE  - Abx: 24h post op ancef         Closed fracture of left distal radius  He is s/p L DR ORIF on 07/19. Improving motor and sensory function of the hand and fingers, likely due to slow recovery from regional block. Will continue to monitor    WBAT through the elbow  Full range of motion          Donovan Isabel MD  Orthopedics  [facility] - Surgery

## 2024-07-20 NOTE — SUBJECTIVE & OBJECTIVE
"Interval HPI:   Overnight events: Remains in 541. POD 1. BG well controlled on home insulin pump. Diet diabetic  Calorie    Eatin%  Nausea: No  Hypoglycemia and intervention: No  Fever: No  TPN and/or TF: No  If yes, type of TF/TPN and rate: n/a    /60   Pulse 86   Temp 98.2 °F (36.8 °C) (Oral)   Resp 18   Ht 5' 5" (1.651 m)   Wt 92.1 kg (203 lb)   SpO2 95%   BMI 33.78 kg/m²     Labs Reviewed and Include    Recent Labs   Lab 24  0515      CALCIUM 8.5*   ALBUMIN 2.8*   PROT 6.0      K 4.2   CO2 23      BUN 21   CREATININE 1.3   ALKPHOS 112   ALT 15   AST 15   BILITOT 0.4     Lab Results   Component Value Date    WBC 9.25 2024    HGB 12.5 (L) 2024    HCT 38.6 (L) 2024    MCV 89 2024     2024     Recent Labs   Lab 24  1617   TSH 6.243*   FREET4 0.98     Lab Results   Component Value Date    HGBA1C 9.9 (H) 2024       Nutritional status:   Body mass index is 33.78 kg/m².  Lab Results   Component Value Date    ALBUMIN 2.8 (L) 2024    ALBUMIN 3.0 (L) 2024    ALBUMIN 3.6 2024     Lab Results   Component Value Date    PREALBUMIN 13 (L) 2024       Estimated Creatinine Clearance: 60.6 mL/min (based on SCr of 1.3 mg/dL).    Accu-Checks  Recent Labs     24  2141 24  0212 24  0553 24  0925 24  1431 24  1535 24  0750   POCTGLUCOSE 190* 252* 224* 171* 264* 249* 122*       Current Medications and/or Treatments Impacting Glycemic Control  Immunotherapy:    Immunosuppressants       None          Steroids:   Hormones (From admission, onward)      Start     Stop Route Frequency Ordered    24 0145  melatonin tablet 6 mg         -- Oral Nightly PRN 24 0048          Pressors:    Autonomic Drugs (From admission, onward)      None          Hyperglycemia/Diabetes Medications:   Antihyperglycemics (From admission, onward)      Start     Stop Route Frequency Ordered    " 07/19/24 1645  insulin lispro insulin pump from home 50 Units        Question Answer Comment   Target number 120    Basal Rate #1 2.3    Basal rate #1 time 1097-1618    Basal Rate #2 1.55    Basal rate #2 time 4256-0432        -- SubQ Continuous 07/19/24 1534    07/19/24 1632  insulin lispro insulin pump from home 1-20 Units        Question Answer Comment   Target number 120    Carbohydrate coverage #1 1:2    Carbohydrate coverage #1 time 3996-2686    Carbohydrate coverage #2 1:3    Carbohydrate coverage #2 time [id number]    Sensitivity #1 1:20    Sensitivity #1 time [phone]        -- SubQ As needed (PRN) 07/19/24 1534

## 2024-07-20 NOTE — PLAN OF CARE
Problem: Occupational Therapy  Goal: Occupational Therapy Goal  Description: Goals to be met by: 8/17     Patient will increase functional independence with ADLs by performing:    UE Dressing with Contact Guard Assistance.  LE Dressing with Minimal Assistance.  Grooming while standing at sink with Contact Guard Assistance.  Toileting from bedside commode with Contact Guard Assistance for hygiene and clothing management.     Outcome: Progressing

## 2024-07-20 NOTE — PROGRESS NOTES
[facility] Medicine  Progress Note    Patient Name: Cortes Schroeder  MRN: [id number]  Patient Class: OP- Observation   Admission Date: 7/18/2024  Length of Stay: 0 days  Attending Physician: Maria Del Rosario Medina MD  Primary Care Provider: Andrew Sinclair Jr., MD        Subjective:     Principal Problem:Closed trimalleolar fracture of right ankle        HPI:  64-year-old man with type 2 diabetes on insulin pump, hypertension, morbid obesity, chronic pain syndrome presents to the emergency department after multiple falls with traumatic injury.  He reports while walking into his [place] building he suffered a series of falls today 3-4 total, prior to the 1st fall he experienced dizziness while walking toward his building and then found himself on the ground, he was able to get up under his own power and then fell again near the elevator, and 2 more times with the last fall occurring as he got into his apartment, he believes he suffered ankle injury on the last fall as he was unable to get back up or bear weight.    He is unsure if he had loss of consciousness he denies any other recent falls.  On ED workup he was found with a right ankle fracture and a distal left radius fracture.  He was taken from the ED to the OR this evening for surgical repair of the ankle fracture, I saw patient in the PACU area after his initial operation, he commented on having some postoperative pain, reported history of intolerance to oxycodone and hydrocodone as they caused hallucinations and psychotic symptoms, we will avoid use.    He uses insulin pump - omnipod changed every 1.5 days.  He is still wearing insulin pump thru tonights surgery.  PACU RN to help him charge phone which is how he controls insulin pump      Overview/Hospital Course:  No notes on file    Interval history- doing well this morning, eating breakfast and he reports pain controlled so far this morning. Swelling improving in left fingers as fairly  swollen yesterday post op but able to make a fist today and improving. Swellign in right ankle present but not too severe so far and no blisters seen and pin sites covered with gauze now. Elevated but positioned a little sideways in bed so helped to move blankets above leg a little more and will have nursing and therapy move him a little more straightened out in bed once they work with him today but has elevaetd and will need to make sure ice is on it as well later this afternoon after thearpy to help with swelling. Na improved to 138 after light IVF yesterday as wel as Cr improved to 1.3 so may have been an element of dehydration on admit since miprovements and no dizzinses so far since admit. Echo done this aM and pending read to ensure no valvular issues that could have been contributor and no reported ectopy on telemetry since admit. Endo following and home insulin pump supplies brought to hospital by roomamte yesterday and hooked up and has dexcom and nusring monitoring to make sure coordinates with accuchecks here and if so then can use dexcom further. He asked about mounjaro and discussed will need to hold while here as has risk of aspiration of stomach contents with anesthesia with it so will need to hold until all surgeries completed as still has 1 more on his ankle next week. Plan for placement but likely will need to stay here for surgery but will f/u with CM Monday to see options for this. Will likely need FMLA as will be non weight bearing for probably at least 2 months for the ankle so will give him information for this ([email or web address]) further so he can have employer start paperwork as suspect will be needed given expected extended time he will not be able to work given weight bearing status post op from 2 limb restrictions for prolonged period expected.        Review of patient's allergies indicates:   Allergen Reactions    Invokana [canagliflozin] Anaphylaxis    Percocet  [oxycodone-acetaminophen] Nausea Only and Hallucinations    Biaxin [clarithromycin]     Hydrocodone Other (See Comments)     Dizzy/nausea/hallucinations    Sulfa (sulfonamide antibiotics) Nausea Only and Rash       No current facility-administered medications on file prior to encounter.     Current Outpatient Medications on File Prior to Encounter   Medication Sig    aspirin (ECOTRIN) 81 MG EC tablet Take 81 mg by mouth once daily.    cinnamon bark (CINNAMON) 500 mg capsule Take 500 mg by mouth 2 (two) times a day.    cyanocobalamin (VITAMIN B-12) 1000 MCG tablet Take 1,000 mcg by mouth once daily.    DULoxetine (CYMBALTA) 60 MG capsule Take 1 capsule (60 mg total) by mouth once daily.    emtricitabine-tenofovir 200-300 mg (TRUVADA) 200-300 mg Tab Take 1 tablet by mouth once daily.    fish oil-omega-3 fatty acids 300-1,000 mg capsule Take 1 capsule by mouth 2 (two) times daily.    gabapentin (NEURONTIN) 600 MG tablet Take 2 tablets (1,200 mg total) by mouth every evening.    HUMALOG U-100 INSULIN 100 unit/mL injection Use in insulin pump; max dose 150 units per day.    insulin pump cartridge (OMNIPOD DASH 5 PACK POD) Crtg Apply new pod to skin every 1.5 days    insulin pump controller (OMNIPOD DASH PDM KIT MISC) by Misc.(Non-Drug; Combo Route) route.    L-CITRULLINE MISC by Misc.(Non-Drug; Combo Route) route Daily.    losartan (COZAAR) 50 MG tablet TAKE 1 TABLET(50 MG) BY MOUTH EVERY DAY (Patient taking differently: Take 50 mg by mouth once daily. TAKE 1 TABLET(50 MG) BY MOUTH EVERY DAY)    magnesium oxide-Mg AA chelate (MG-PLUS-PROTEIN) 133 mg Tab Take 500 mg by mouth every evening.     MOUNJARO 10 mg/0.5 mL PnIj Inject 10 mg into the skin once a week.    nortriptyline (PAMELOR) 50 MG capsule Take 1 capsule (50 mg total) by mouth nightly.    rosuvastatin (CRESTOR) 10 MG tablet Take 10 mg by mouth every evening.    tiZANidine (ZANAFLEX) 4 MG tablet Take 2 tablets (8 mg total) by mouth nightly as needed (as needed  for muscle spasms).    vitamin D 1000 units Tab Take 1,000 Units by mouth 2 (two) times daily.    blood-glucose sensor (DEXCOM G6 SENSOR) Omaira Change sensor every 10 days    blood-glucose transmitter (DEXCOM G6 TRANSMITTER) Omaira Change transmitter every 3 months    lancets Misc To check BG 4-7 times daily, to use with insurance preferred meter    methocarbamoL (ROBAXIN) 500 MG Tab Take 1,000 mg by mouth 4 (four) times daily.    [DISCONTINUED] insulin aspart U-100 (NOVOLOG U-100 INSULIN ASPART) 100 unit/mL injection Use in insulin pump. Max daily dose 150 units.     Family History       Problem Relation (Age of Onset)    Alzheimer's disease Father    Cataracts Father    Heart attack Mother    Hypertension Father, Mother    Migraines Mother    Parkinsonism Father          Tobacco Use    Smoking status: Never    Smokeless tobacco: Never   Substance and Sexual Activity    Alcohol use: Yes     Alcohol/week: 1.0 standard drink of alcohol     Types: 1 Glasses of wine per week     Comment: occasionally    Drug use: No    Sexual activity: Not Currently     Partners: Male     Birth control/protection: Condom     Comment: 10/2/17      Review of Systems   Constitutional:  Positive for activity change.   Respiratory: Negative.     Cardiovascular: Negative.    Gastrointestinal: Negative.      Objective:     Vital Signs (Most Recent):  Temp: 98.2 °F (36.8 °C) (07/20/24 0755)  Pulse: 86 (07/20/24 1054)  Resp: 18 (07/20/24 0900)  BP: 118/60 (07/20/24 1006)  SpO2: 95 % (07/20/24 0755) Vital Signs (24h Range):  Temp:  [97 °F (36.1 °C)-98.4 °F (36.9 °C)] 98.2 °F (36.8 °C)  Pulse:  [79-97] 86  Resp:  [10-20] 18  SpO2:  [92 %-100 %] 95 %  BP: ()/(50-77) 118/60     Weight: 92.1 kg (203 lb)  Body mass index is 33.78 kg/m².     Physical Exam  Vitals and nursing note reviewed.   Constitutional:       General: He is not in acute distress.     Comments: Pleasant.    HENT:      Head: Normocephalic and atraumatic.   Eyes:       "General: No scleral icterus.  Cardiovascular:      Rate and Rhythm: Normal rate and regular rhythm.   Pulmonary:      Effort: Pulmonary effort is normal. No respiratory distress.      Breath sounds: No wheezing.      Comments: Limited anterior exam  Abdominal:      General: Bowel sounds are normal.      Palpations: Abdomen is soft.   Musculoskeletal:      Cervical back: Neck supple.      Left lower leg: No edema.      Comments: RLE in ex fix, elevated with pin sites covered with gauze    LUE in splint and sling.   Neurological:      Mental Status: He is alert.                Significant Labs: All pertinent labs within the past 24 hours have been reviewed.  ABGs: No results for input(s): "PH", "PCO2", "HCO3", "POCSATURATED", "BE", "TOTALHB", "COHB", "METHB", "O2HB", "POCFIO2", "PO2" in the last 48 hours.  CBC:   Recent Labs   Lab 07/18/24  1617 07/19/24  0230 07/20/24  0515   WBC 13.75* 11.17 9.25   HGB 15.0 13.8* 12.5*   HCT 42.4 39.9* 38.6*    242 234     CMP:   Recent Labs   Lab 07/18/24  1617 07/19/24  0230 07/20/24  0515   * 133* 138   K 4.0 4.5 4.2    98 105   CO2 23 24 23    236* 110   BUN 35* 30* 21   CREATININE 1.8* 1.6* 1.3   CALCIUM 9.9 8.7 8.5*   PROT 7.3 6.3 6.0   ALBUMIN 3.6 3.0* 2.8*   BILITOT 0.4 0.3 0.4   ALKPHOS 147* 122 112   AST 22 18 15   ALT 38 31 15   ANIONGAP 10 11 10     Cardiac Markers: No results for input(s): "CKMB", "MYOGLOBIN", "BNP", "TROPISTAT" in the last 48 hours.  Coagulation:   Recent Labs   Lab 07/18/24  1802   INR 1.0     Lactic Acid: No results for input(s): "LACTATE" in the last 48 hours.  Troponin:   Recent Labs   Lab 07/18/24  1617   TROPONINI 0.013     Urine Studies:   Recent Labs   Lab 07/19/24  0231   COLORU Yellow   APPEARANCEUA Clear   PHUR 6.0   SPECGRAV 1.020   PROTEINUA Trace*   GLUCUA 4+*   KETONESU Trace*   BILIRUBINUA Negative   OCCULTUA Negative   NITRITE Negative   LEUKOCYTESUR Negative   RBCUA 0   WBCUA 1   BACTERIA Rare   SQUAMEPITHEL " 0       Significant Imaging: I have reviewed all pertinent imaging results/findings within the past 24 hours.    CT ANKLE (INCLUDING HINDFOOT) WITHOUT CONTRAST RIGHT; CT 3D RENDERING WO INDEPENDENT WORKSTATION     CLINICAL HISTORY:  2mm cuts with 3d recons;; FX;     TECHNIQUE:  Axial images of the right ankle were obtained at 0.625 mm intervals without administration of IV contrast.  Coronal and sagittal reformatted images were reviewed.  3D reconstructed images were created on a separate workstation and reviewed.     COMPARISON:  Radiograph 07/18/2024     FINDINGS:  There is motion artifact.     Allowing for the above, there is a comminuted displaced fracture involving the distal aspect of the fibula noting several fracture fragments lie about the fracture plane.  There is comminuted fracture involving the distal aspect of the tibia, fracture planes extend to involve the medial malleolus and posterior aspect of the tibia noting several fracture fragments about the fracture plane.  The talus appears intact.  The calcaneus is intact.  The visualized portions of the kidney a forms and cuboid are intact.  The navicular is intact.  The proximal metatarsals are intact.  There is malalignment of the tibiotalar articulation noting lateral subluxation of the tibia in relationship to the talus.  There is edema about the fracture sites.  There is vascular calcification.  There are degenerative changes of the calcaneus.     Impression:     1. Fractures of the distal tibia and fibula noting tibiotalar subluxation as described.        Electronically signed by:Carlos Espinoza MD  Date:                                            07/18/2024  Time:                                           18:19           Exam Ended: 07/18/24 17:58 CDT Last Resulted: 07/18/24 18:19 CDT                 XR WRIST COMPLETE 3 VIEWS LEFT     CLINICAL HISTORY:  pain;     TECHNIQUE:  PA, lateral, and oblique views of the left wrist were performed.      COMPARISON:  None     FINDINGS:  Three views left wrist.     There is osteopenia.  There are degenerative changes of the wrist.  There is fracture involving the medial aspect of the distal radius, extending to the radiocarpal articulation with impaction.  There is edema about the dorsal aspect of the wrist.  The distal ulna appears intact.     Impression:     1. Distal radial fracture as described.        Electronically signed by:Carlos Espinoza MD  Date:                                            07/18/2024  Time:                                           18:48      Assessment/Plan:      * Closed trimalleolar fracture of right ankle  Sustained in fall with concern for presyncope episodes with dizziness and blackout type episode. Possible poor intake/dehydration contributor with only banana eaten that day before episode and hyponatremia/DEEPALI on admit with improevments now as well as hypotension as BP 70s in the field and normotension now without losartan. Cont to hold.  -maintain on telemetry, echo pending  -trop negative, UA negative. Tsh mildly up but free T4 normal.  -s/p ex fix on 7/18 PM with dr velez and pin site care BID, elevate and ice. Fixation likely 7/26 pending swellign ipmrovements. Multimodal pain medications. NWB to RLE, PT/OT consulted. Likely will need SNF, suspect will be after definitive fixation, will f//u with CM  -bowel regimen  -ASA BID for ppx        Syncope  Workup with echo pending, cont tele  Culprits may have been dehydration with deepali/hyponatremia on admit, Hypotension with 70s systolic with EMS he reports (hold losartan). No signs of hypoglycemia as contributor  -other labs stable on work up      Multiple falls  Etiology unclear  -pt was not hypoglycemic on arrival  -possible had syncope with 1st fall  -check ECHO  -keep  on cardiac monitoring      Closed fracture of left distal radius  Secondary to fall  S/p ORIF on 7/19 with dr velez with ORIF and Synthes distal radius VA volar  plate and screws   -recs for PT/OT training with a platform walker for gait with weight-bearing across the left elbow and minimal gripping of the post. Range of motion of the fingers on the left hand.   Multimodal pain management limiting narcotics. Tight blood glucose control.   -asa BID for ppx  -sling and splint in place      Abnormal thyroid blood test  Free T4 nromal with TSH 6. Repeat in 6 weeks to document stability      Hyponatremia  Given IV fluids in ED and on 7/19 post op - with improved and normalized now  Recent Labs   Lab 07/20/24  0515            Uncontrolled type 2 diabetes mellitus with hyperglycemia  Patient's FSGs are uncontrolled due to hyperglycemia on current medication regimen.  Last A1c reviewed-   Lab Results   Component Value Date    LABA1C 10.8 (H) 08/15/2016    HGBA1C 9.9 (H) 07/18/2024     Most recent fingerstick glucose reviewed-   Recent Labs   Lab 07/19/24  1431 07/19/24  1535 07/20/24  0750   POCTGLUCOSE 264* 249* 122*     Current correctional scale  Medium  Maintain anti-hyperglycemic dose as follows-   Antihyperglycemics (From admission, onward)      Start     Stop Route Frequency Ordered    07/19/24 1645  insulin lispro insulin pump from home 50 Units        Question Answer Comment   Target number 120    Basal Rate #1 2.3    Basal rate #1 time 6642-2859    Basal Rate #2 1.55    Basal rate #2 time 3698-9293        -- SubQ Continuous 07/19/24 1534    07/19/24 1632  insulin lispro insulin pump from home 1-20 Units        Question Answer Comment   Target number 120    Carbohydrate coverage #1 1:2    Carbohydrate coverage #1 time 9908-0466    Carbohydrate coverage #2 1:3    Carbohydrate coverage #2 time [phone]    Sensitivity #1 1:20    Sensitivity #1 time [phone]        -- SubQ As needed (PRN) 07/19/24 1534          Hold Oral hypoglycemics while patient is in the hospital.  -has been workign with outpatient endocrine MD long term. Has been diabetic since 1995.  -DM diet,  insulin pump in plaec with endo managing  -hold mounjaro until all surgeries completed      Hypertension, essential  Chronic, controlled. Latest blood pressure and vitals reviewed-     Temp:  [97 °F (36.1 °C)-98.4 °F (36.9 °C)]   Pulse:  [79-97]   Resp:  [10-20]   BP: ()/(50-77)   SpO2:  [92 %-100 %] .   Home meds for hypertension were reviewed and noted below.   Hypertension Medications               losartan (COZAAR) 50 MG tablet TAKE 1 TABLET(50 MG) BY MOUTH EVERY DAY            -reported to be 70s systolic with EMS and holding losartan further as possible contributor to presyncopal episode. Normotensive now    Morbid obesity  Body mass index is 33.78 kg/m². Morbid obesity complicates all aspects of disease management from diagnostic modalities to treatment.   -pt on mounjaro as outpatient. And has been losing weight with this. Hold mounjaro until all surgerise completed as risk of aspiration of stomach contents with anesthesia so can resume once out of hospital and discussed with him         Insulin pump in place    -endocrinology consulted and appreciate assistance. Roommate brought home pump and supplies and hooked up 7/19 PM. He plans to bring extra back up supplies further on 7/21..  Patient states humalog brand short acting insulin does not work for him or produce proper effect.  At this time he would prefer to maintain his insulin pump.       Chronic pain syndrome  Continue home nortriptyline + gabapentin + meds for acute pain now  Follows closely with pain management for chronic back pain and receives injections long term for this        VTE Risk Mitigation (From admission, onward)           Ordered     IP VTE HIGH RISK PATIENT  Once         07/19/24 0048     Place sequential compression device  Until discontinued         07/19/24 0048                    Discharge Planning   JAYLEEN: 7/23/2024     Code Status: Full Code   Is the patient medically ready for discharge?:     Reason for patient still in  hospital (select all that apply): Patient trending condition                     Maria Del Rosario Medina MD  Department of Hospital Medicine   [facility] - Surgery

## 2024-07-20 NOTE — ANESTHESIA POSTPROCEDURE EVALUATION
Anesthesia Post Evaluation    Patient: Cortes Schroeder    Procedure(s) Performed: Procedure(s) (LRB):  APPLICATION, EXTERNAL FIXATION DEVICE, FOR ANKLE FRACTURE (Right)  CLOSED REDUCTION, FRACTURE, ANKLE, TRIMALLEOLAR (Right)    Final Anesthesia Type: general      Patient location during evaluation: PACU  Patient participation: Yes- Able to Participate  Level of consciousness: awake and alert and oriented  Pain management: adequate  Airway patency: patent    PONV status at discharge: No PONV  Anesthetic complications: no      Cardiovascular status: blood pressure returned to baseline and hemodynamically stable  Respiratory status: unassisted  Hydration status: euvolemic  Follow-up not needed.              Vitals Value Taken Time   /65 07/19/24 1912   Temp 36.9 °C (98.4 °F) 07/19/24 1912   Pulse 97 07/19/24 1912   Resp 20 07/19/24 2023   SpO2 95 % 07/19/24 2100         Event Time   Out of Recovery 07/18/2024 21:45:00         Pain/Judy Score: Pain Rating Prior to Med Admin: 7 (7/19/2024  8:30 PM)  Pain Rating Post Med Admin: 4 (7/19/2024  9:06 PM)  Judy Score: 9 (7/19/2024  9:45 AM)

## 2024-07-21 LAB
ALBUMIN SERPL BCP-MCNC: 2.7 G/DL (ref 3.5–5.2)
ALP SERPL-CCNC: 97 U/L (ref 55–135)
ALT SERPL W/O P-5'-P-CCNC: 9 U/L (ref 10–44)
ANION GAP SERPL CALC-SCNC: 7 MMOL/L (ref 8–16)
AST SERPL-CCNC: 17 U/L (ref 10–40)
BASOPHILS # BLD AUTO: 0.07 K/UL (ref 0–0.2)
BASOPHILS NFR BLD: 0.8 % (ref 0–1.9)
BILIRUB SERPL-MCNC: 0.4 MG/DL (ref 0.1–1)
BUN SERPL-MCNC: 17 MG/DL (ref 8–23)
CALCIUM SERPL-MCNC: 8.5 MG/DL (ref 8.7–10.5)
CHLORIDE SERPL-SCNC: 106 MMOL/L (ref 95–110)
CO2 SERPL-SCNC: 25 MMOL/L (ref 23–29)
CREAT SERPL-MCNC: 1.2 MG/DL (ref 0.5–1.4)
DIFFERENTIAL METHOD BLD: ABNORMAL
EOSINOPHIL # BLD AUTO: 0.5 K/UL (ref 0–0.5)
EOSINOPHIL NFR BLD: 5.6 % (ref 0–8)
ERYTHROCYTE [DISTWIDTH] IN BLOOD BY AUTOMATED COUNT: 13.7 % (ref 11.5–14.5)
EST. GFR  (NO RACE VARIABLE): >60 ML/MIN/1.73 M^2
GLUCOSE SERPL-MCNC: 153 MG/DL (ref 70–110)
HCT VFR BLD AUTO: 40 % (ref 40–54)
HGB BLD-MCNC: 12.7 G/DL (ref 14–18)
IMM GRANULOCYTES # BLD AUTO: 0.04 K/UL (ref 0–0.04)
IMM GRANULOCYTES NFR BLD AUTO: 0.4 % (ref 0–0.5)
LYMPHOCYTES # BLD AUTO: 1.6 K/UL (ref 1–4.8)
LYMPHOCYTES NFR BLD: 17.3 % (ref 18–48)
MAGNESIUM SERPL-MCNC: 2.1 MG/DL (ref 1.6–2.6)
MCH RBC QN AUTO: 28.9 PG (ref 27–31)
MCHC RBC AUTO-ENTMCNC: 31.8 G/DL (ref 32–36)
MCV RBC AUTO: 91 FL (ref 82–98)
MONOCYTES # BLD AUTO: 0.9 K/UL (ref 0.3–1)
MONOCYTES NFR BLD: 9.8 % (ref 4–15)
NEUTROPHILS # BLD AUTO: 6.2 K/UL (ref 1.8–7.7)
NEUTROPHILS NFR BLD: 66.1 % (ref 38–73)
NRBC BLD-RTO: 0 /100 WBC
PHOSPHATE SERPL-MCNC: 3.6 MG/DL (ref 2.7–4.5)
PLATELET # BLD AUTO: 221 K/UL (ref 150–450)
PMV BLD AUTO: 11 FL (ref 9.2–12.9)
POCT GLUCOSE: 252 MG/DL (ref 70–110)
POCT GLUCOSE: 62 MG/DL (ref 70–110)
POTASSIUM SERPL-SCNC: 4.6 MMOL/L (ref 3.5–5.1)
PROT SERPL-MCNC: 6 G/DL (ref 6–8.4)
RBC # BLD AUTO: 4.39 M/UL (ref 4.6–6.2)
SODIUM SERPL-SCNC: 138 MMOL/L (ref 136–145)
WBC # BLD AUTO: 9.33 K/UL (ref 3.9–12.7)

## 2024-07-21 PROCEDURE — 36415 COLL VENOUS BLD VENIPUNCTURE: CPT | Performed by: HOSPITALIST

## 2024-07-21 PROCEDURE — 84100 ASSAY OF PHOSPHORUS: CPT | Performed by: HOSPITALIST

## 2024-07-21 PROCEDURE — 85025 COMPLETE CBC W/AUTO DIFF WBC: CPT | Performed by: HOSPITALIST

## 2024-07-21 PROCEDURE — 83735 ASSAY OF MAGNESIUM: CPT | Performed by: HOSPITALIST

## 2024-07-21 PROCEDURE — 25000003 PHARM REV CODE 250

## 2024-07-21 PROCEDURE — 21400001 HC TELEMETRY ROOM

## 2024-07-21 PROCEDURE — 25000003 PHARM REV CODE 250: Performed by: HOSPITALIST

## 2024-07-21 PROCEDURE — 99232 SBSQ HOSP IP/OBS MODERATE 35: CPT | Mod: ,,, | Performed by: NURSE PRACTITIONER

## 2024-07-21 PROCEDURE — 80053 COMPREHEN METABOLIC PANEL: CPT | Performed by: HOSPITALIST

## 2024-07-21 RX ORDER — BISACODYL 10 MG/1
10 SUPPOSITORY RECTAL DAILY PRN
Status: DISCONTINUED | OUTPATIENT
Start: 2024-07-21 | End: 2024-07-23 | Stop reason: HOSPADM

## 2024-07-21 RX ORDER — HYDRALAZINE HYDROCHLORIDE 25 MG/1
25 TABLET, FILM COATED ORAL EVERY 8 HOURS PRN
Status: DISCONTINUED | OUTPATIENT
Start: 2024-07-21 | End: 2024-07-23 | Stop reason: HOSPADM

## 2024-07-21 RX ORDER — SYRING-NEEDL,DISP,INSUL,0.3 ML 29 G X1/2"
296 SYRINGE, EMPTY DISPOSABLE MISCELLANEOUS ONCE
Status: COMPLETED | OUTPATIENT
Start: 2024-07-21 | End: 2024-07-21

## 2024-07-21 RX ORDER — LOSARTAN POTASSIUM 25 MG/1
25 TABLET ORAL DAILY
Status: DISCONTINUED | OUTPATIENT
Start: 2024-07-21 | End: 2024-07-21

## 2024-07-21 RX ADMIN — DULOXETINE HYDROCHLORIDE 60 MG: 60 CAPSULE, DELAYED RELEASE ORAL at 09:07

## 2024-07-21 RX ADMIN — ACETAMINOPHEN 650 MG: 325 TABLET ORAL at 08:07

## 2024-07-21 RX ADMIN — CHOLECALCIFEROL TAB 25 MCG (1000 UNIT) 1000 UNITS: 25 TAB at 09:07

## 2024-07-21 RX ADMIN — NORTRIPTYLINE HYDROCHLORIDE 50 MG: 25 CAPSULE ORAL at 08:07

## 2024-07-21 RX ADMIN — Medication 200 MG: at 09:07

## 2024-07-21 RX ADMIN — CHOLECALCIFEROL TAB 25 MCG (1000 UNIT) 1000 UNITS: 25 TAB at 08:07

## 2024-07-21 RX ADMIN — SENNOSIDES AND DOCUSATE SODIUM 1 TABLET: 50; 8.6 TABLET ORAL at 09:07

## 2024-07-21 RX ADMIN — ASPIRIN 81 MG: 81 TABLET, COATED ORAL at 08:07

## 2024-07-21 RX ADMIN — MORPHINE SULFATE 15 MG: 15 TABLET ORAL at 01:07

## 2024-07-21 RX ADMIN — CELECOXIB 200 MG: 200 CAPSULE ORAL at 09:07

## 2024-07-21 RX ADMIN — POLYETHYLENE GLYCOL 3350 17 G: 17 POWDER, FOR SOLUTION ORAL at 09:07

## 2024-07-21 RX ADMIN — CYANOCOBALAMIN TAB 1000 MCG 1000 MCG: 1000 TAB at 09:07

## 2024-07-21 RX ADMIN — MAGNESIUM CITRATE 296 ML: 1.75 LIQUID ORAL at 01:07

## 2024-07-21 RX ADMIN — SENNOSIDES AND DOCUSATE SODIUM 1 TABLET: 50; 8.6 TABLET ORAL at 08:07

## 2024-07-21 RX ADMIN — GABAPENTIN 1200 MG: 400 CAPSULE ORAL at 08:07

## 2024-07-21 RX ADMIN — EMTRICITABINE AND TENOFOVIR DISOPROXIL FUMARATE 1 TABLET: 200; 300 TABLET, FILM COATED ORAL at 09:07

## 2024-07-21 RX ADMIN — Medication 1 CAPSULE: at 08:07

## 2024-07-21 RX ADMIN — HYDRALAZINE HYDROCHLORIDE 25 MG: 25 TABLET ORAL at 01:07

## 2024-07-21 RX ADMIN — GABAPENTIN 600 MG: 300 CAPSULE ORAL at 09:07

## 2024-07-21 RX ADMIN — ACETAMINOPHEN 650 MG: 325 TABLET ORAL at 04:07

## 2024-07-21 RX ADMIN — ASPIRIN 81 MG: 81 TABLET, COATED ORAL at 09:07

## 2024-07-21 RX ADMIN — ACETAMINOPHEN 650 MG: 325 TABLET ORAL at 02:07

## 2024-07-21 RX ADMIN — ACETAMINOPHEN 650 MG: 325 TABLET ORAL at 09:07

## 2024-07-21 RX ADMIN — ATORVASTATIN CALCIUM 40 MG: 40 TABLET, FILM COATED ORAL at 09:07

## 2024-07-21 RX ADMIN — Medication 1 CAPSULE: at 09:07

## 2024-07-21 NOTE — PROGRESS NOTES
"[facility] - [facility]  Endocrinology  Progress Note    Admit Date: 2024     Reason for Consult: Management of T2DM, Hyperglycemia     Surgical Procedure and Date:  24   ORIF, FRACTURE, RADIUS, DISTAL - LEFT (Left)      Diabetes diagnosis year:     Home Diabetes Medications:    Humalog via OmniPod insulin pump  Mounjaro 10 mg weekly     Current pump settings:  Basal 12 am to 2.3 and 6 am to 1.55  ICR to 3 at 12 am, 2 at 4 pm  ISF to 1:20   AIT 3 hrs  Target 110-120       Patient had anaphylaxis reaction to SGLT2 inhibitors specifically Invokana        Lab Results   Component Value Date    LABA1C 10.8 (H) 08/15/2016    HGBA1C 9.9 (H) 2024       How often checking glucose at home? Dexcom G6   BG readings on regimen: variable 40s-300s (mostly on high side)   Hypoglycemia on the regimen?  Yes  Missed doses on regimen?  No    Diabetes Complications include:     Hyperglycemia and Diabetic peripheral neuropathy       Complicating diabetes co morbidities:   HLD, HTN, Obesity       HPI:   Patient is a 64 y.o. male with a diagnosis of anxiety, HLD, HTN, and T2DM who presents to the ED after a fall. Imaging shows right trimalleolar ankle fracture with skin tenting & non-displaced left distal radius fracture. He now presents for the above procedure(s). Patient sees Pepe Meade DO for insulin pump management. Last seen on . Endocrinology consulted for management of T2DM.       Interval HPI:   Overnight events: POD 2. Remains in 541A. NAEON. BG stable and within goal ranges on home insulin pump. Diet diabetic  Calorie    Eatin%  Nausea: No  Hypoglycemia and intervention: No  Fever: No  TPN and/or TF: No  If yes, type of TF/TPN and rate: n/a    BP (!) 179/79 (BP Location: Right arm, Patient Position: Lying)   Pulse 90   Temp 97.6 °F (36.4 °C) (Oral)   Resp 20   Ht 5' 5" (1.651 m)   Wt 92.1 kg (203 lb)   SpO2 98%   BMI 33.78 kg/m²     Labs Reviewed and Include    Recent Labs   Lab " 07/21/24  0511   *   CALCIUM 8.5*   ALBUMIN 2.7*   PROT 6.0      K 4.6   CO2 25      BUN 17   CREATININE 1.2   ALKPHOS 97   ALT 9*   AST 17   BILITOT 0.4     Lab Results   Component Value Date    WBC 9.33 07/21/2024    HGB 12.7 (L) 07/21/2024    HCT 40.0 07/21/2024    MCV 91 07/21/2024     07/21/2024     Recent Labs   Lab 07/18/24  1617   TSH 6.243*   FREET4 0.98     Lab Results   Component Value Date    HGBA1C 9.9 (H) 07/18/2024       Nutritional status:   Body mass index is 33.78 kg/m².  Lab Results   Component Value Date    ALBUMIN 2.7 (L) 07/21/2024    ALBUMIN 2.8 (L) 07/20/2024    ALBUMIN 3.0 (L) 07/19/2024     Lab Results   Component Value Date    PREALBUMIN 13 (L) 07/18/2024       Estimated Creatinine Clearance: 65.7 mL/min (based on SCr of 1.2 mg/dL).    Accu-Checks  Recent Labs     07/18/24  2141 07/19/24  0212 07/19/24  0553 07/19/24  0925 07/19/24  1431 07/19/24  1535 07/20/24  0750 07/20/24  1141 07/20/24  1642   POCTGLUCOSE 190* 252* 224* 171* 264* 249* 122* 132* 92       Current Medications and/or Treatments Impacting Glycemic Control  Immunotherapy:    Immunosuppressants       None          Steroids:   Hormones (From admission, onward)      Start     Stop Route Frequency Ordered    07/19/24 0145  melatonin tablet 6 mg         -- Oral Nightly PRN 07/19/24 0048          Pressors:    Autonomic Drugs (From admission, onward)      None          Hyperglycemia/Diabetes Medications:   Antihyperglycemics (From admission, onward)      Start     Stop Route Frequency Ordered    07/19/24 1645  insulin lispro insulin pump from home 50 Units        Question Answer Comment   Target number 120    Basal Rate #1 2.3    Basal rate #1 time 5634-7001    Basal Rate #2 1.55    Basal rate #2 time 6123-7756        -- SubQ Continuous 07/19/24 1534    07/19/24 1632  insulin lispro insulin pump from home 1-20 Units        Question Answer Comment   Target number 120    Carbohydrate coverage #1 1:2     Carbohydrate coverage #1 time 2456-1531    Carbohydrate coverage #2 1:3    Carbohydrate coverage #2 time 4073-6606    Sensitivity #1 1:20    Sensitivity #1 time [phone]        -- SubQ As needed (PRN) 07/19/24 1534            ASSESSMENT and PLAN    Endocrine  Insulin pump status  Insulin Pump:  Patient has the ability and wherewithal to manage the pump.  Order has been placed to notify nurse of patient using own pump. (Order Set: Adult and Pediatric Home Insulin Pump)     Humalog via OmniPod insulin pump  Current pump settings:  Basal 12 am to 2.3 and 6 am to 1.55  ICR to 3 at 12 am, 2 at 4 pm  ISF to 1:20   AIT 3 hrs  Target 110-120      Pump type:    Change infusion set: Every 1-1.5 days (7/19/24)  Change insertion site: with change of infusion set. (7/19/24)   Location of insertion site: Abdomen  State of insertion site: appears clean    Obesity  Body mass index is 33.78 kg/m².  May increase insulin resistance.         Uncontrolled type 2 diabetes mellitus with hyperglycemia  BG goal 140-180    Continue home insulin pump (see below for settings)   BG monitoring ac/hs/0200    In the event of pump malfunction- notify endocrine for SQ insulin orders     ** Please call Endocrine for any BG related issues **        Orthopedic  * Closed trimalleolar fracture of right ankle  Managed per primary team  Optimize BG control            Meg Penaloza NP  Endocrinology  [facility] - Surgery

## 2024-07-21 NOTE — PLAN OF CARE
Problem: Adult Inpatient Plan of Care  Goal: Plan of Care Review  Outcome: Progressing  Goal: Patient-Specific Goal (Individualized)  Outcome: Progressing  Goal: Absence of Hospital-Acquired Illness or Injury  Outcome: Progressing  Goal: Optimal Comfort and Wellbeing  Outcome: Progressing  Goal: Readiness for Transition of Care  Outcome: Progressing     Problem: Diabetes Comorbidity  Goal: Blood Glucose Level Within Targeted Range  Outcome: Progressing     Problem: Wound  Goal: Optimal Coping  Outcome: Progressing  Goal: Optimal Functional Ability  Outcome: Progressing  Goal: Absence of Infection Signs and Symptoms  Outcome: Progressing  Goal: Improved Oral Intake  Outcome: Progressing  Goal: Optimal Pain Control and Function  Outcome: Progressing  Goal: Skin Health and Integrity  Outcome: Progressing  Goal: Optimal Wound Healing  Outcome: Progressing     Problem: Skin Injury Risk Increased  Goal: Skin Health and Integrity  Outcome: Progressing     Problem: Fall Injury Risk  Goal: Absence of Fall and Fall-Related Injury  Outcome: Progressing       Dexcom was changed today at 1200. Addressed patients pain, pump, position, need for potty, and possessions in reach. Patient remains free of falls, and verbalizes understanding in fall prevention and safety. Safety measures remain in place. Reinforced fall prevention and safety education. Call light and personal belongings within reach, bed in lowest position with wheels locked, side rails up x2, Instructed to call with any needs or complaints, verbalized understanding. Continue current plan of care.

## 2024-07-21 NOTE — SUBJECTIVE & OBJECTIVE
"Interval HPI:   Overnight events: POD 2. Remains in 541A. NAEON. BG stable and within goal ranges on home insulin pump. Diet diabetic  Calorie    Eatin%  Nausea: No  Hypoglycemia and intervention: No  Fever: No  TPN and/or TF: No  If yes, type of TF/TPN and rate: n/a    BP (!) 179/79 (BP Location: Right arm, Patient Position: Lying)   Pulse 90   Temp 97.6 °F (36.4 °C) (Oral)   Resp 20   Ht 5' 5" (1.651 m)   Wt 92.1 kg (203 lb)   SpO2 98%   BMI 33.78 kg/m²     Labs Reviewed and Include    Recent Labs   Lab 24  0511   *   CALCIUM 8.5*   ALBUMIN 2.7*   PROT 6.0      K 4.6   CO2 25      BUN 17   CREATININE 1.2   ALKPHOS 97   ALT 9*   AST 17   BILITOT 0.4     Lab Results   Component Value Date    WBC 9.33 2024    HGB 12.7 (L) 2024    HCT 40.0 2024    MCV 91 2024     2024     Recent Labs   Lab 24  1617   TSH 6.243*   FREET4 0.98     Lab Results   Component Value Date    HGBA1C 9.9 (H) 2024       Nutritional status:   Body mass index is 33.78 kg/m².  Lab Results   Component Value Date    ALBUMIN 2.7 (L) 2024    ALBUMIN 2.8 (L) 2024    ALBUMIN 3.0 (L) 2024     Lab Results   Component Value Date    PREALBUMIN 13 (L) 2024       Estimated Creatinine Clearance: 65.7 mL/min (based on SCr of 1.2 mg/dL).    Accu-Checks  Recent Labs     24  2141 24  0212 24  0553 24  0925 24  1431 24  1535 24  0750 24  1141 24  1642   POCTGLUCOSE 190* 252* 224* 171* 264* 249* 122* 132* 92       Current Medications and/or Treatments Impacting Glycemic Control  Immunotherapy:    Immunosuppressants       None          Steroids:   Hormones (From admission, onward)      Start     Stop Route Frequency Ordered    24 0145  melatonin tablet 6 mg         -- Oral Nightly PRN 24 0048          Pressors:    Autonomic Drugs (From admission, onward)      None      "     Hyperglycemia/Diabetes Medications:   Antihyperglycemics (From admission, onward)      Start     Stop Route Frequency Ordered    07/19/24 1645  insulin lispro insulin pump from home 50 Units        Question Answer Comment   Target number 120    Basal Rate #1 2.3    Basal rate #1 time 2027-1930    Basal Rate #2 1.55    Basal rate #2 time 4039-6580        -- SubQ Continuous 07/19/24 1534    07/19/24 1632  insulin lispro insulin pump from home 1-20 Units        Question Answer Comment   Target number 120    Carbohydrate coverage #1 1:2    Carbohydrate coverage #1 time 5492-6118    Carbohydrate coverage #2 1:3    Carbohydrate coverage #2 time 5154-4223    Sensitivity #1 1:20    Sensitivity #1 time [phone]        -- SubQ As needed (PRN) 07/19/24 1534

## 2024-07-21 NOTE — SUBJECTIVE & OBJECTIVE
Interval history- feeling well this AM, overnight BP elevated but having some issues taking BP so unclear if truly elevated as was normotensive through day yesterday. Unable to take BP on right arm as insulin pump on arm now given had surgery on left arm and avoiding left arm for BP measurements given fracture repair in lower left arm. Being taken in right wrist/right forearm so unsure if measurements accurate completely so will see how trends go today, as has very low BPs with ems initiially to 70s systolic so cautious before starting home losartan, prn hydralazine added for now and then if perisistently high will add back but this may def be a contributor so avoid overcorrection. Endocrine adjusting insulin pump and appreciate assistance. Labs stable and got woken up for this and hard stick so will change to every tues/Friday for now as can't do every tues/thusr per epic but can hold off ont hem tomorrow and then may add another set for Thursday extra if needed. Given he has 2 limbs fx he may be a candidate for IP rehab as high intensity therapy rec, if so he may be higher likely to be able to dc and come back for same day surgery as a possibility but if not available then will plan to stay until surgery Friday, will discuss options with CM more tomorrow and udpated him on the potential possibiliites.  Will likely need FMLA as will not be expected to work for a minimum likely 2 months as usually at least this time period for weight bearing I suspect but will f/u with ortho recs after he has fixation of ankle later this week          Review of patient's allergies indicates:   Allergen Reactions    Invokana [canagliflozin] Anaphylaxis    Percocet [oxycodone-acetaminophen] Nausea Only and Hallucinations    Biaxin [clarithromycin]     Hydrocodone Other (See Comments)     Dizzy/nausea/hallucinations    Sulfa (sulfonamide antibiotics) Nausea Only and Rash       No current facility-administered medications on file prior  to encounter.     Current Outpatient Medications on File Prior to Encounter   Medication Sig    aspirin (ECOTRIN) 81 MG EC tablet Take 81 mg by mouth once daily.    cinnamon bark (CINNAMON) 500 mg capsule Take 500 mg by mouth 2 (two) times a day.    cyanocobalamin (VITAMIN B-12) 1000 MCG tablet Take 1,000 mcg by mouth once daily.    DULoxetine (CYMBALTA) 60 MG capsule Take 1 capsule (60 mg total) by mouth once daily.    emtricitabine-tenofovir 200-300 mg (TRUVADA) 200-300 mg Tab Take 1 tablet by mouth once daily.    fish oil-omega-3 fatty acids 300-1,000 mg capsule Take 1 capsule by mouth 2 (two) times daily.    gabapentin (NEURONTIN) 600 MG tablet Take 2 tablets (1,200 mg total) by mouth every evening.    HUMALOG U-100 INSULIN 100 unit/mL injection Use in insulin pump; max dose 150 units per day.    insulin pump cartridge (OMNIPOD DASH 5 PACK POD) Crtg Apply new pod to skin every 1.5 days    insulin pump controller (OMNIPOD DASH PDM KIT MISC) by Misc.(Non-Drug; Combo Route) route.    L-CITRULLINE MISC by Misc.(Non-Drug; Combo Route) route Daily.    losartan (COZAAR) 50 MG tablet TAKE 1 TABLET(50 MG) BY MOUTH EVERY DAY (Patient taking differently: Take 50 mg by mouth once daily. TAKE 1 TABLET(50 MG) BY MOUTH EVERY DAY)    magnesium oxide-Mg AA chelate (MG-PLUS-PROTEIN) 133 mg Tab Take 500 mg by mouth every evening.     MOUNJARO 10 mg/0.5 mL PnIj Inject 10 mg into the skin once a week.    nortriptyline (PAMELOR) 50 MG capsule Take 1 capsule (50 mg total) by mouth nightly.    rosuvastatin (CRESTOR) 10 MG tablet Take 10 mg by mouth every evening.    tiZANidine (ZANAFLEX) 4 MG tablet Take 2 tablets (8 mg total) by mouth nightly as needed (as needed for muscle spasms).    vitamin D 1000 units Tab Take 1,000 Units by mouth 2 (two) times daily.    blood-glucose sensor (DEXCOM G6 SENSOR) Omaira Change sensor every 10 days    blood-glucose transmitter (DEXCOM G6 TRANSMITTER) Omaira Change transmitter every 3 months    lancets  Misc To check BG 4-7 times daily, to use with insurance preferred meter    methocarbamoL (ROBAXIN) 500 MG Tab Take 1,000 mg by mouth 4 (four) times daily.    [DISCONTINUED] insulin aspart U-100 (NOVOLOG U-100 INSULIN ASPART) 100 unit/mL injection Use in insulin pump. Max daily dose 150 units.     Family History       Problem Relation (Age of Onset)    Alzheimer's disease Father    Cataracts Father    Heart attack Mother    Hypertension Father, Mother    Migraines Mother    Parkinsonism Father          Tobacco Use    Smoking status: Never    Smokeless tobacco: Never   Substance and Sexual Activity    Alcohol use: Yes     Alcohol/week: 1.0 standard drink of alcohol     Types: 1 Glasses of wine per week     Comment: occasionally    Drug use: No    Sexual activity: Not Currently     Partners: Male     Birth control/protection: Condom     Comment: 10/2/17      Review of Systems   Constitutional:  Positive for activity change.   Respiratory: Negative.     Cardiovascular: Negative.    Gastrointestinal: Negative.      Objective:     Vital Signs (Most Recent):  Temp: 97.6 °F (36.4 °C) (07/21/24 0840)  Pulse: 99 (07/21/24 1100)  Resp: 20 (07/21/24 0840)  BP: (!) 179/79 (07/21/24 0840)  SpO2: 98 % (07/21/24 0840) Vital Signs (24h Range):  Temp:  [96.3 °F (35.7 °C)-98.7 °F (37.1 °C)] 97.6 °F (36.4 °C)  Pulse:  [69-99] 99  Resp:  [18-20] 20  SpO2:  [96 %-99 %] 98 %  BP: (121-189)/(66-88) 179/79     Weight: 92.1 kg (203 lb)  Body mass index is 33.78 kg/m².     Physical Exam  Vitals and nursing note reviewed.   Constitutional:       General: He is not in acute distress.     Comments: Pleasant.    HENT:      Head: Normocephalic and atraumatic.   Eyes:      General: No scleral icterus.  Cardiovascular:      Rate and Rhythm: Normal rate and regular rhythm.   Pulmonary:      Effort: Pulmonary effort is normal. No respiratory distress.      Breath sounds: No wheezing.      Comments: Limited anterior exam  Abdominal:      General:  "Bowel sounds are normal.      Palpations: Abdomen is soft.   Musculoskeletal:      Cervical back: Neck supple.      Left lower leg: No edema.      Comments: RLE in ex fix, elevated with pin sites covered with gauze    LUE in splint and sling.  Insulin pump on right upper arm   Neurological:      Mental Status: He is alert.                Significant Labs: All pertinent labs within the past 24 hours have been reviewed.  ABGs: No results for input(s): "PH", "PCO2", "HCO3", "POCSATURATED", "BE", "TOTALHB", "COHB", "METHB", "O2HB", "POCFIO2", "PO2" in the last 48 hours.  CBC:   Recent Labs   Lab 07/20/24  0515 07/21/24  0511   WBC 9.25 9.33   HGB 12.5* 12.7*   HCT 38.6* 40.0    221     CMP:   Recent Labs   Lab 07/20/24  0515 07/21/24  0511    138   K 4.2 4.6    106   CO2 23 25    153*   BUN 21 17   CREATININE 1.3 1.2   CALCIUM 8.5* 8.5*   PROT 6.0 6.0   ALBUMIN 2.8* 2.7*   BILITOT 0.4 0.4   ALKPHOS 112 97   AST 15 17   ALT 15 9*   ANIONGAP 10 7*     Cardiac Markers: No results for input(s): "CKMB", "MYOGLOBIN", "BNP", "TROPISTAT" in the last 48 hours.  Coagulation:   No results for input(s): "PT", "INR", "APTT" in the last 48 hours.    Lactic Acid: No results for input(s): "LACTATE" in the last 48 hours.  Troponin:   No results for input(s): "TROPONINI", "TROPONINIHS" in the last 48 hours.    Urine Studies:   No results for input(s): "COLORU", "APPEARANCEUA", "PHUR", "SPECGRAV", "PROTEINUA", "GLUCUA", "KETONESU", "BILIRUBINUA", "OCCULTUA", "NITRITE", "UROBILINOGEN", "LEUKOCYTESUR", "RBCUA", "WBCUA", "BACTERIA", "SQUAMEPITHEL", "HYALINECASTS" in the last 48 hours.    Invalid input(s): "WRIGHTSUR"      Significant Imaging: I have reviewed all pertinent imaging results/findings within the past 24 hours.    CT ANKLE (INCLUDING HINDFOOT) WITHOUT CONTRAST RIGHT; CT 3D RENDERING WO INDEPENDENT WORKSTATION     CLINICAL HISTORY:  2mm cuts with 3d recons;; FX;     TECHNIQUE:  Axial images of the right " ankle were obtained at 0.625 mm intervals without administration of IV contrast.  Coronal and sagittal reformatted images were reviewed.  3D reconstructed images were created on a separate workstation and reviewed.     COMPARISON:  Radiograph 07/18/2024     FINDINGS:  There is motion artifact.     Allowing for the above, there is a comminuted displaced fracture involving the distal aspect of the fibula noting several fracture fragments lie about the fracture plane.  There is comminuted fracture involving the distal aspect of the tibia, fracture planes extend to involve the medial malleolus and posterior aspect of the tibia noting several fracture fragments about the fracture plane.  The talus appears intact.  The calcaneus is intact.  The visualized portions of the kidney a forms and cuboid are intact.  The navicular is intact.  The proximal metatarsals are intact.  There is malalignment of the tibiotalar articulation noting lateral subluxation of the tibia in relationship to the talus.  There is edema about the fracture sites.  There is vascular calcification.  There are degenerative changes of the calcaneus.     Impression:     1. Fractures of the distal tibia and fibula noting tibiotalar subluxation as described.        Electronically signed by:Carlos Espinoza MD  Date:                                            07/18/2024  Time:                                           18:19           Exam Ended: 07/18/24 17:58 CDT Last Resulted: 07/18/24 18:19 CDT                 XR WRIST COMPLETE 3 VIEWS LEFT     CLINICAL HISTORY:  pain;     TECHNIQUE:  PA, lateral, and oblique views of the left wrist were performed.     COMPARISON:  None     FINDINGS:  Three views left wrist.     There is osteopenia.  There are degenerative changes of the wrist.  There is fracture involving the medial aspect of the distal radius, extending to the radiocarpal articulation with impaction.  There is edema about the dorsal aspect of the wrist.   The distal ulna appears intact.     Impression:     1. Distal radial fracture as described.        Electronically signed by:Carlos Espinoza MD  Date:                                            07/18/2024  Time:                                           18:48

## 2024-07-21 NOTE — PROGRESS NOTES
[facility]  Orthopedics  Progress Note    Patient Name: Cortes Schroeder  MRN: [id number]  Admission Date: 7/18/2024  Hospital Length of Stay: 1 days  Attending Provider: Maria Del Rosario Medina MD  Primary Care Provider: Andrew Sinclair Jr., MD  Follow-up For: Procedure(s) (LRB):  ORIF, FRACTURE, RADIUS, DISTAL - LEFT (Left)    Post-Operative Day: 2 Days Post-Op  Subjective:     Principal Problem:Closed trimalleolar fracture of right ankle    Principal Orthopedic Problem: Right ankle trimal & Left distal radius fracture s/p Right ankle ex-fix 07/18, L DR ORIF 07/19    Interval History: Pt seen and examined at bedside. VSS.  ABIGAIL. Reported improved ankle pain. Improved sensory and motor function of the LUE. Denies fevers or chill.       Review of patient's allergies indicates:   Allergen Reactions    Invokana [canagliflozin] Anaphylaxis    Percocet [oxycodone-acetaminophen] Nausea Only and Hallucinations    Biaxin [clarithromycin]     Hydrocodone Other (See Comments)     Dizzy/nausea/hallucinations    Sulfa (sulfonamide antibiotics) Nausea Only and Rash       Current Facility-Administered Medications   Medication    acetaminophen tablet 650 mg    acetaminophen tablet 650 mg    aluminum-magnesium hydroxide-simethicone 200-200-20 mg/5 mL suspension 30 mL    aspirin EC tablet 81 mg    atorvastatin tablet 40 mg    celecoxib capsule 200 mg    cyanocobalamin tablet 1,000 mcg    dextrose 10% bolus 125 mL 125 mL    dextrose 10% bolus 125 mL 125 mL    dextrose 10% bolus 250 mL 250 mL    dextrose 10% bolus 250 mL 250 mL    DULoxetine DR capsule 60 mg    emtricitabine-tenofovir 200-300 mg per tablet 1 tablet    gabapentin capsule 600 mg    And    gabapentin capsule 1,200 mg    glucagon (human recombinant) injection 1 mg    glucose chewable tablet 16 g    glucose chewable tablet 24 g    insulin lispro insulin pump from home 1-20 Units    insulin lispro insulin pump from home 50 Units    magnesium oxide split tablet 200  "mg    melatonin tablet 6 mg    methocarbamoL tablet 500 mg    morphine tablet 15 mg    naloxone 0.4 mg/mL injection 0.02 mg    nortriptyline capsule 50 mg    omega 3-dha-epa-fish oil capsule 1 capsule    ondansetron injection 4 mg    polyethylene glycol packet 17 g    prochlorperazine injection Soln 5 mg    senna-docusate 8.6-50 mg per tablet 1 tablet    sodium chloride 0.9% flush 10 mL    vitamin D 1000 units tablet 1,000 Units     Objective:     Vital Signs (Most Recent):  Temp: 96.3 °F (35.7 °C) (07/21/24 0438)  Pulse: 80 (07/21/24 0515)  Resp: 18 (07/21/24 0438)  BP: (!) 163/88 (07/21/24 0515)  SpO2: 99 % (07/21/24 0438) Vital Signs (24h Range):  Temp:  [96.3 °F (35.7 °C)-98.7 °F (37.1 °C)] 96.3 °F (35.7 °C)  Pulse:  [69-89] 80  Resp:  [16-18] 18  SpO2:  [95 %-99 %] 99 %  BP: (118-189)/(60-88) 163/88     Weight: 92.1 kg (203 lb)  Height: 5' 5" (165.1 cm)  Body mass index is 33.78 kg/m².      Intake/Output Summary (Last 24 hours) at 7/21/2024 0542  Last data filed at 7/20/2024 1951  Gross per 24 hour   Intake --   Output 1100 ml   Net -1100 ml        Ortho/SPM Exam     AAOx4  NAD  Reg rate  No increased WOB    RLE    Ex-fix in place. Pin sites covered in gauze. Pin site care.  Moderate swelling present  Compartments soft/compressible  SILT throughout  Fires TA/EHL/Gastroc/FHL   DP pulse 2+. Brisk cap refill      LUE    Arm placed in a sling  Compartments soft/compressible  Reduced sensation to light touch.  Able to make ok sign, thumb up or cross fingers.   Radial pulse 2+. Brisk cap refill      Significant Labs: All pertinent labs within the past 24 hours have been reviewed.    Significant Imaging: I have reviewed and interpreted all pertinent imaging results/findings.  Assessment/Plan:     * Closed trimalleolar fracture of right ankle  Cortes Schroeder is a 64 y.o. male presenting with history of DM2 presents with right trimalleolar ankle fracture with skin tenting & non-displaced left distal radius fracture. " No signs of ecchymosis or petechial over the ankle. He is neuro intact. He was closed reduced and splinted in the ED. Ankle was grossly unstable in a splint. He is admitted to  for evaluation of syncopal episodes. He is s/p right ankle ex-fix on 07/19, L distal radius 07/19. Plan for definitive fixation when soft tissue is amenable to surgery.    - NWB RLE. LUE weightbearing at the elbow. Full ROM LUE  - DVT Prophylaxis: SCDs at all times while in bed. ASA.  - Pain control: multimodal pain management  - PT/OT: Eval & treat NWB RLE  - Abx: 24h post op ancef completed         Closed fracture of left distal radius  He is s/p L DR ORIF on 07/19. Able to make Ok sign, cross fingers and give thumb up.     WBAT through the elbow  Full range of motion          Donovan Isabel MD  Orthopedics  [facility]

## 2024-07-21 NOTE — NURSING
Nurses Note -- 4 Eyes      7/21/2024   12:45 PM      Skin assessed during: Daily Assessment      [x] No Altered Skin Integrity Present    [x]Prevention Measures Documented      [] Yes- Altered Skin Integrity Present or Discovered   [] LDA Added if Not in Epic (Describe Wound)   [] New Altered Skin Integrity was Present on Admit and Documented in LDA   [] Wound Image Taken    Wound Care Consulted? No    Attending Nurse:  Jumana Hoffmann RN/Staff Member:   NIEVES Weinstein

## 2024-07-21 NOTE — SUBJECTIVE & OBJECTIVE
Principal Problem:Closed trimalleolar fracture of right ankle    Principal Orthopedic Problem: Right ankle trimal & Left distal radius fracture s/p Right ankle ex-fix 07/18, L DR ORIF 07/19    Interval History: Pt seen and examined at bedside. JACKIE HAYES. Reported improved ankle pain. Improved sensory and motor function of the LUE. Denies fevers or chill.       Review of patient's allergies indicates:   Allergen Reactions    Invokana [canagliflozin] Anaphylaxis    Percocet [oxycodone-acetaminophen] Nausea Only and Hallucinations    Biaxin [clarithromycin]     Hydrocodone Other (See Comments)     Dizzy/nausea/hallucinations    Sulfa (sulfonamide antibiotics) Nausea Only and Rash       Current Facility-Administered Medications   Medication    acetaminophen tablet 650 mg    acetaminophen tablet 650 mg    aluminum-magnesium hydroxide-simethicone 200-200-20 mg/5 mL suspension 30 mL    aspirin EC tablet 81 mg    atorvastatin tablet 40 mg    celecoxib capsule 200 mg    cyanocobalamin tablet 1,000 mcg    dextrose 10% bolus 125 mL 125 mL    dextrose 10% bolus 125 mL 125 mL    dextrose 10% bolus 250 mL 250 mL    dextrose 10% bolus 250 mL 250 mL    DULoxetine DR capsule 60 mg    emtricitabine-tenofovir 200-300 mg per tablet 1 tablet    gabapentin capsule 600 mg    And    gabapentin capsule 1,200 mg    glucagon (human recombinant) injection 1 mg    glucose chewable tablet 16 g    glucose chewable tablet 24 g    insulin lispro insulin pump from home 1-20 Units    insulin lispro insulin pump from home 50 Units    magnesium oxide split tablet 200 mg    melatonin tablet 6 mg    methocarbamoL tablet 500 mg    morphine tablet 15 mg    naloxone 0.4 mg/mL injection 0.02 mg    nortriptyline capsule 50 mg    omega 3-dha-epa-fish oil capsule 1 capsule    ondansetron injection 4 mg    polyethylene glycol packet 17 g    prochlorperazine injection Soln 5 mg    senna-docusate 8.6-50 mg per tablet 1 tablet    sodium chloride 0.9% flush 10 mL  "   vitamin D 1000 units tablet 1,000 Units     Objective:     Vital Signs (Most Recent):  Temp: 96.3 °F (35.7 °C) (07/21/24 0438)  Pulse: 80 (07/21/24 0515)  Resp: 18 (07/21/24 0438)  BP: (!) 163/88 (07/21/24 0515)  SpO2: 99 % (07/21/24 0438) Vital Signs (24h Range):  Temp:  [96.3 °F (35.7 °C)-98.7 °F (37.1 °C)] 96.3 °F (35.7 °C)  Pulse:  [69-89] 80  Resp:  [16-18] 18  SpO2:  [95 %-99 %] 99 %  BP: (118-189)/(60-88) 163/88     Weight: 92.1 kg (203 lb)  Height: 5' 5" (165.1 cm)  Body mass index is 33.78 kg/m².      Intake/Output Summary (Last 24 hours) at 7/21/2024 0542  Last data filed at 7/20/2024 1951  Gross per 24 hour   Intake --   Output 1100 ml   Net -1100 ml        Ortho/SPM Exam     AAOx4  NAD  Reg rate  No increased WOB    RLE    Ex-fix in place. Pin sites covered in gauze. Pin site care.  Moderate swelling present  Compartments soft/compressible  SILT throughout  Fires TA/EHL/Gastroc/FHL   DP pulse 2+. Brisk cap refill      LUE    Arm placed in a sling  Compartments soft/compressible  Reduced sensation to light touch.  Able to make ok sign, thumb up or cross fingers.   Radial pulse 2+. Brisk cap refill      Significant Labs: All pertinent labs within the past 24 hours have been reviewed.    Significant Imaging: I have reviewed and interpreted all pertinent imaging results/findings.  "

## 2024-07-21 NOTE — PROGRESS NOTES
[facility] Medicine  Progress Note    Patient Name: Cortes Schroeder  MRN: [id number]  Patient Class: IP- Inpatient   Admission Date: 7/18/2024  Length of Stay: 1 days  Attending Physician: Maria Del Rosario Medina MD  Primary Care Provider: Andrew Sinclair Jr., MD        Subjective:     Principal Problem:Closed trimalleolar fracture of right ankle        HPI:  64-year-old man with type 2 diabetes on insulin pump, hypertension, morbid obesity, chronic pain syndrome presents to the emergency department after multiple falls with traumatic injury.  He reports while walking into his [employer] building he suffered a series of falls today 3-4 total, prior to the 1st fall he experienced dizziness while walking toward his building and then found himself on the ground, he was able to get up under his own power and then fell again near the elevator, and 2 more times with the last fall occurring as he got into his apartment, he believes he suffered ankle injury on the last fall as he was unable to get back up or bear weight.    He is unsure if he had loss of consciousness he denies any other recent falls.  On ED workup he was found with a right ankle fracture and a distal left radius fracture.  He was taken from the ED to the OR this evening for surgical repair of the ankle fracture, I saw patient in the PACU area after his initial operation, he commented on having some postoperative pain, reported history of intolerance to oxycodone and hydrocodone as they caused hallucinations and psychotic symptoms, we will avoid use.    He uses insulin pump - omnipod changed every 1.5 days.  He is still wearing insulin pump thru tonights surgery.  PACU RN to help him charge phone which is how he controls insulin pump      Overview/Hospital Course:  No notes on file    Interval history- feeling well this AM, overnight BP elevated but having some issues taking BP so unclear if truly elevated as was normotensive through day  yesterday. Unable to take BP on right arm as insulin pump on arm now given had surgery on left arm and avoiding left arm for BP measurements given fracture repair in lower left arm. Being taken in right wrist/right forearm so unsure if measurements accurate completely so will see how trends go today, as has very low BPs with ems initiially to 70s systolic so cautious before starting home losartan, prn hydralazine added for now and then if perisistently high will add back but this may def be a contributor so avoid overcorrection. Endocrine adjusting insulin pump and appreciate assistance. Labs stable and got woken up for this and hard stick so will change to every tues/Friday for now as can't do every tues/thusr per epic but can hold off ont hem tomorrow and then may add another set for Thursday extra if needed. Given he has 2 limbs fx he may be a candidate for IP rehab as high intensity therapy rec, if so he may be higher likely to be able to dc and come back for same day surgery as a possibility but if not available then will plan to stay until surgery Friday, will discuss options with CM more tomorrow and udpated him on the potential possibiliites.  Will likely need FMLA as will not be expected to work for a minimum likely 2 months as usually at least this time period for weight bearing I suspect but will f/u with ortho recs after he has fixation of ankle later this week          Review of patient's allergies indicates:   Allergen Reactions    Invokana [canagliflozin] Anaphylaxis    Percocet [oxycodone-acetaminophen] Nausea Only and Hallucinations    Biaxin [clarithromycin]     Hydrocodone Other (See Comments)     Dizzy/nausea/hallucinations    Sulfa (sulfonamide antibiotics) Nausea Only and Rash       No current facility-administered medications on file prior to encounter.     Current Outpatient Medications on File Prior to Encounter   Medication Sig    aspirin (ECOTRIN) 81 MG EC tablet Take 81 mg by mouth once  daily.    cinnamon bark (CINNAMON) 500 mg capsule Take 500 mg by mouth 2 (two) times a day.    cyanocobalamin (VITAMIN B-12) 1000 MCG tablet Take 1,000 mcg by mouth once daily.    DULoxetine (CYMBALTA) 60 MG capsule Take 1 capsule (60 mg total) by mouth once daily.    emtricitabine-tenofovir 200-300 mg (TRUVADA) 200-300 mg Tab Take 1 tablet by mouth once daily.    fish oil-omega-3 fatty acids 300-1,000 mg capsule Take 1 capsule by mouth 2 (two) times daily.    gabapentin (NEURONTIN) 600 MG tablet Take 2 tablets (1,200 mg total) by mouth every evening.    HUMALOG U-100 INSULIN 100 unit/mL injection Use in insulin pump; max dose 150 units per day.    insulin pump cartridge (OMNIPOD DASH 5 PACK POD) Crtg Apply new pod to skin every 1.5 days    insulin pump controller (OMNIPOD DASH PDM KIT MISC) by Misc.(Non-Drug; Combo Route) route.    L-CITRULLINE MISC by Misc.(Non-Drug; Combo Route) route Daily.    losartan (COZAAR) 50 MG tablet TAKE 1 TABLET(50 MG) BY MOUTH EVERY DAY (Patient taking differently: Take 50 mg by mouth once daily. TAKE 1 TABLET(50 MG) BY MOUTH EVERY DAY)    magnesium oxide-Mg AA chelate (MG-PLUS-PROTEIN) 133 mg Tab Take 500 mg by mouth every evening.     MOUNJARO 10 mg/0.5 mL PnIj Inject 10 mg into the skin once a week.    nortriptyline (PAMELOR) 50 MG capsule Take 1 capsule (50 mg total) by mouth nightly.    rosuvastatin (CRESTOR) 10 MG tablet Take 10 mg by mouth every evening.    tiZANidine (ZANAFLEX) 4 MG tablet Take 2 tablets (8 mg total) by mouth nightly as needed (as needed for muscle spasms).    vitamin D 1000 units Tab Take 1,000 Units by mouth 2 (two) times daily.    blood-glucose sensor (DEXCOM G6 SENSOR) Omaira Change sensor every 10 days    blood-glucose transmitter (DEXCOM G6 TRANSMITTER) Omaira Change transmitter every 3 months    lancets Misc To check BG 4-7 times daily, to use with insurance preferred meter    methocarbamoL (ROBAXIN) 500 MG Tab Take 1,000 mg by mouth 4 (four) times daily.     [DISCONTINUED] insulin aspart U-100 (NOVOLOG U-100 INSULIN ASPART) 100 unit/mL injection Use in insulin pump. Max daily dose 150 units.     Family History       Problem Relation (Age of Onset)    Alzheimer's disease Father    Cataracts Father    Heart attack Mother    Hypertension Father, Mother    Migraines Mother    Parkinsonism Father          Tobacco Use    Smoking status: Never    Smokeless tobacco: Never   Substance and Sexual Activity    Alcohol use: Yes     Alcohol/week: 1.0 standard drink of alcohol     Types: 1 Glasses of wine per week     Comment: occasionally    Drug use: No    Sexual activity: Not Currently     Partners: Male     Birth control/protection: Condom     Comment: 10/2/17      Review of Systems   Constitutional:  Positive for activity change.   Respiratory: Negative.     Cardiovascular: Negative.    Gastrointestinal: Negative.      Objective:     Vital Signs (Most Recent):  Temp: 97.6 °F (36.4 °C) (07/21/24 0840)  Pulse: 99 (07/21/24 1100)  Resp: 20 (07/21/24 0840)  BP: (!) 179/79 (07/21/24 0840)  SpO2: 98 % (07/21/24 0840) Vital Signs (24h Range):  Temp:  [96.3 °F (35.7 °C)-98.7 °F (37.1 °C)] 97.6 °F (36.4 °C)  Pulse:  [69-99] 99  Resp:  [18-20] 20  SpO2:  [96 %-99 %] 98 %  BP: (121-189)/(66-88) 179/79     Weight: 92.1 kg (203 lb)  Body mass index is 33.78 kg/m².     Physical Exam  Vitals and nursing note reviewed.   Constitutional:       General: He is not in acute distress.     Comments: Pleasant.    HENT:      Head: Normocephalic and atraumatic.   Eyes:      General: No scleral icterus.  Cardiovascular:      Rate and Rhythm: Normal rate and regular rhythm.   Pulmonary:      Effort: Pulmonary effort is normal. No respiratory distress.      Breath sounds: No wheezing.      Comments: Limited anterior exam  Abdominal:      General: Bowel sounds are normal.      Palpations: Abdomen is soft.   Musculoskeletal:      Cervical back: Neck supple.      Left lower leg: No edema.      Comments:  "RLE in ex fix, elevated with pin sites covered with gauze    LUE in splint and sling.  Insulin pump on right upper arm   Neurological:      Mental Status: He is alert.                Significant Labs: All pertinent labs within the past 24 hours have been reviewed.  ABGs: No results for input(s): "PH", "PCO2", "HCO3", "POCSATURATED", "BE", "TOTALHB", "COHB", "METHB", "O2HB", "POCFIO2", "PO2" in the last 48 hours.  CBC:   Recent Labs   Lab 07/20/24  0515 07/21/24  0511   WBC 9.25 9.33   HGB 12.5* 12.7*   HCT 38.6* 40.0    221     CMP:   Recent Labs   Lab 07/20/24  0515 07/21/24  0511    138   K 4.2 4.6    106   CO2 23 25    153*   BUN 21 17   CREATININE 1.3 1.2   CALCIUM 8.5* 8.5*   PROT 6.0 6.0   ALBUMIN 2.8* 2.7*   BILITOT 0.4 0.4   ALKPHOS 112 97   AST 15 17   ALT 15 9*   ANIONGAP 10 7*     Cardiac Markers: No results for input(s): "CKMB", "MYOGLOBIN", "BNP", "TROPISTAT" in the last 48 hours.  Coagulation:   No results for input(s): "PT", "INR", "APTT" in the last 48 hours.    Lactic Acid: No results for input(s): "LACTATE" in the last 48 hours.  Troponin:   No results for input(s): "TROPONINI", "TROPONINIHS" in the last 48 hours.    Urine Studies:   No results for input(s): "COLORU", "APPEARANCEUA", "PHUR", "SPECGRAV", "PROTEINUA", "GLUCUA", "KETONESU", "BILIRUBINUA", "OCCULTUA", "NITRITE", "UROBILINOGEN", "LEUKOCYTESUR", "RBCUA", "WBCUA", "BACTERIA", "SQUAMEPITHEL", "HYALINECASTS" in the last 48 hours.    Invalid input(s): "WRIGHTSUR"      Significant Imaging: I have reviewed all pertinent imaging results/findings within the past 24 hours.    CT ANKLE (INCLUDING HINDFOOT) WITHOUT CONTRAST RIGHT; CT 3D RENDERING WO INDEPENDENT WORKSTATION     CLINICAL HISTORY:  2mm cuts with 3d recons;; FX;     TECHNIQUE:  Axial images of the right ankle were obtained at 0.625 mm intervals without administration of IV contrast.  Coronal and sagittal reformatted images were reviewed.  3D reconstructed " images were created on a separate workstation and reviewed.     COMPARISON:  Radiograph 07/18/2024     FINDINGS:  There is motion artifact.     Allowing for the above, there is a comminuted displaced fracture involving the distal aspect of the fibula noting several fracture fragments lie about the fracture plane.  There is comminuted fracture involving the distal aspect of the tibia, fracture planes extend to involve the medial malleolus and posterior aspect of the tibia noting several fracture fragments about the fracture plane.  The talus appears intact.  The calcaneus is intact.  The visualized portions of the kidney a forms and cuboid are intact.  The navicular is intact.  The proximal metatarsals are intact.  There is malalignment of the tibiotalar articulation noting lateral subluxation of the tibia in relationship to the talus.  There is edema about the fracture sites.  There is vascular calcification.  There are degenerative changes of the calcaneus.     Impression:     1. Fractures of the distal tibia and fibula noting tibiotalar subluxation as described.        Electronically signed by:Carlos Espinoza MD  Date:                                            07/18/2024  Time:                                           18:19           Exam Ended: 07/18/24 17:58 CDT Last Resulted: 07/18/24 18:19 CDT                 XR WRIST COMPLETE 3 VIEWS LEFT     CLINICAL HISTORY:  pain;     TECHNIQUE:  PA, lateral, and oblique views of the left wrist were performed.     COMPARISON:  None     FINDINGS:  Three views left wrist.     There is osteopenia.  There are degenerative changes of the wrist.  There is fracture involving the medial aspect of the distal radius, extending to the radiocarpal articulation with impaction.  There is edema about the dorsal aspect of the wrist.  The distal ulna appears intact.     Impression:     1. Distal radial fracture as described.        Electronically signed by:Carlos Espinoza MD  Date:                                             07/18/2024  Time:                                           18:48      Assessment/Plan:      * Closed trimalleolar fracture of right ankle  Sustained in fall with concern for presyncope episodes with dizziness and blackout type episode. Possible poor intake/dehydration contributor with only banana eaten that day before episode and hyponatremia/DEEPALI on admit with improevments now as well as hypotension as BP 70s in the field   -maintain on telemetry, echo normal with only mild elevation in PAP  -trop negative, UA negative. Tsh mildly up but free T4 normal.  -s/p ex fix on 7/18 PM with dr velez and pin site care BID, elevate and ice. Fixation likely 7/26 pending swellign ipmrovements. Multimodal pain medications. NWB to RLE, PT/OT consulted. Recs high intensity therapy, with UE and LE fx may be good rehab candidate and will discuss with CM tomorrow. If qualifies for rehab may be able to dc and come back for same day surgery for fixation but still very likely may have to wait to dc until af after definitive fixation, will f//u with CM tomorrow more about optinos. Will also likely need FMLA Paperwork as likely javier have extended NWB time for suspected at least 2 months but will f/u ortho recs post ORIF later this week but typical times for this type of injury at minimum usually  -bowel regimen  -ASA BID for ppx        Syncope  Workup with echo normal, cont tele  Culprits may have been dehydration with deepali/hyponatremia on admit, Hypotension with 70s systolic with EMS he reports (hold losartan). No signs of hypoglycemia as contributor  -other labs stable on work up      Multiple falls  Etiology unclear  -pt was not hypoglycemic on arrival  -possible had syncope with 1st fall  -check ECHO  -keep  on cardiac monitoring      Closed fracture of left distal radius  Secondary to fall  S/p ORIF on 7/19 with dr velez with ORIF and Synthes distal radius VA volar plate and screws   -recs for  PT/OT training with a platform walker for gait with weight-bearing across the left elbow and minimal gripping of the post. Range of motion of the fingers on the left hand.   Multimodal pain management limiting narcotics. Tight blood glucose control.   -asa BID for ppx  -sling and splint in place      Abnormal thyroid blood test  Free T4 nromal with TSH 6. Repeat in 6 weeks to document stability      Hyponatremia  Given IV fluids in ED and on 7/19 post op - with improved and normalized now  Recent Labs   Lab 07/21/24  0511        He reports has been a chronic issue in the past and gone to the ER by PCP referral for this and given saline with improvements  Discussed on 7/21 if strenous activity/working out/sweating once recovered from this injury, would use isotonic fluid to hydrate like sugar free gatorade/pedialyte as can sometimes dilute with straight water alone as recovery fluid. Can also have pseudohypnatremia if glucose is highly elevated    Uncontrolled type 2 diabetes mellitus with hyperglycemia  Patient's FSGs are uncontrolled due to hyperglycemia on current medication regimen.  Last A1c reviewed-   Lab Results   Component Value Date    LABA1C 10.8 (H) 08/15/2016    HGBA1C 9.9 (H) 07/18/2024     Most recent fingerstick glucose reviewed-   Recent Labs   Lab 07/19/24  1431 07/19/24  1535 07/20/24  0750   POCTGLUCOSE 264* 249* 122*     Current correctional scale  Medium  Maintain anti-hyperglycemic dose as follows-   Antihyperglycemics (From admission, onward)      Start     Stop Route Frequency Ordered    07/19/24 1645  insulin lispro insulin pump from home 50 Units        Question Answer Comment   Target number 120    Basal Rate #1 2.3    Basal rate #1 time 3774-8822    Basal Rate #2 1.55    Basal rate #2 time 0511-1789        -- SubQ Continuous 07/19/24 1534    07/19/24 1632  insulin lispro insulin pump from home 1-20 Units        Question Answer Comment   Target number 120    Carbohydrate coverage #1  1:2    Carbohydrate coverage #1 time 0533-8927    Carbohydrate coverage #2 1:3    Carbohydrate coverage #2 time 3959-4801    Sensitivity #1 1:20    Sensitivity #1 time [id number]        -- SubQ As needed (PRN) 07/19/24 1534          Hold Oral hypoglycemics while patient is in the hospital.  -has been workign with outpatient endocrine MD long term. Has been diabetic since 1995.  -DM diet, insulin pump in plaec with endo managing  -hold mounjaro until all surgeries completed      Hypertension, essential  Chronic, controlled. Latest blood pressure and vitals reviewed-     Temp:  [96.3 °F (35.7 °C)-98.7 °F (37.1 °C)]   Pulse:  [69-99]   Resp:  [18-20]   BP: (121-189)/(66-88)   SpO2:  [96 %-99 %] .   Home meds for hypertension were reviewed and noted below.   Hypertension Medications               losartan (COZAAR) 50 MG tablet TAKE 1 TABLET(50 MG) BY MOUTH EVERY DAY            -reported to be 70s systolic with EMS and holding losartan further as possible contributor to presyncopal episode. Normotensive now on 7/20 but ticking up 7/21, possible technique as difficulty getting BP as left arm post op and insulin pump in right arm so using right wrist, right forearm. Prn hdyralazine added for now as overly cautious about overcorrecting before resuming home losartan given above    Obesity  Body mass index is 33.78 kg/m². Morbid obesity complicates all aspects of disease management from diagnostic modalities to treatment.   -pt on mounjaro as outpatient. And has been losing weight with this. Hold mounjaro until all surgerise completed as risk of aspiration of stomach contents with anesthesia so can resume once out of hospital and discussed with him         Insulin pump status    -endocrinology consulted and appreciate assistance. Roommate brought home pump and supplies and hooked up 7/19 PM. He plans to bring extra back up supplies further on 7/21..  Patient states humalog brand short acting insulin does not work for him or  produce proper effect.  At this time he would prefer to maintain his insulin pump.       Chronic pain syndrome  Continue home nortriptyline + gabapentin + meds for acute pain now  Follows closely with pain management for chronic back pain and receives injections long term for this        VTE Risk Mitigation (From admission, onward)           Ordered     IP VTE HIGH RISK PATIENT  Once         07/19/24 0048     Place sequential compression device  Until discontinued         07/19/24 0048                    Discharge Planning   JAYLEEN: 7/23/2024     Code Status: Full Code   Is the patient medically ready for discharge?:     Reason for patient still in hospital (select all that apply): Patient trending condition                     Maria Del Rosario Medina MD  Department of Hospital Medicine   [facility] - Surgery

## 2024-07-22 LAB
POCT GLUCOSE: 131 MG/DL (ref 70–110)
POCT GLUCOSE: 173 MG/DL (ref 70–110)
POCT GLUCOSE: 197 MG/DL (ref 70–110)

## 2024-07-22 PROCEDURE — 99232 SBSQ HOSP IP/OBS MODERATE 35: CPT | Mod: ,,, | Performed by: NURSE PRACTITIONER

## 2024-07-22 PROCEDURE — 25000003 PHARM REV CODE 250: Performed by: HOSPITALIST

## 2024-07-22 PROCEDURE — 25000003 PHARM REV CODE 250: Performed by: NURSE PRACTITIONER

## 2024-07-22 PROCEDURE — 99222 1ST HOSP IP/OBS MODERATE 55: CPT | Mod: ,,, | Performed by: NURSE PRACTITIONER

## 2024-07-22 PROCEDURE — 21400001 HC TELEMETRY ROOM

## 2024-07-22 PROCEDURE — 25000003 PHARM REV CODE 250

## 2024-07-22 RX ADMIN — ACETAMINOPHEN 650 MG: 325 TABLET ORAL at 03:07

## 2024-07-22 RX ADMIN — GABAPENTIN 1200 MG: 400 CAPSULE ORAL at 09:07

## 2024-07-22 RX ADMIN — CHOLECALCIFEROL TAB 25 MCG (1000 UNIT) 1000 UNITS: 25 TAB at 09:07

## 2024-07-22 RX ADMIN — Medication 16 G: at 02:07

## 2024-07-22 RX ADMIN — ACETAMINOPHEN 650 MG: 325 TABLET ORAL at 09:07

## 2024-07-22 RX ADMIN — Medication 200 MG: at 09:07

## 2024-07-22 RX ADMIN — DULOXETINE HYDROCHLORIDE 60 MG: 60 CAPSULE, DELAYED RELEASE ORAL at 09:07

## 2024-07-22 RX ADMIN — ACETAMINOPHEN 650 MG: 325 TABLET ORAL at 10:07

## 2024-07-22 RX ADMIN — CELECOXIB 200 MG: 200 CAPSULE ORAL at 09:07

## 2024-07-22 RX ADMIN — SENNOSIDES AND DOCUSATE SODIUM 1 TABLET: 50; 8.6 TABLET ORAL at 09:07

## 2024-07-22 RX ADMIN — POLYETHYLENE GLYCOL 3350 17 G: 17 POWDER, FOR SOLUTION ORAL at 09:07

## 2024-07-22 RX ADMIN — ASPIRIN 81 MG: 81 TABLET, COATED ORAL at 09:07

## 2024-07-22 RX ADMIN — EMTRICITABINE AND TENOFOVIR DISOPROXIL FUMARATE 1 TABLET: 200; 300 TABLET, FILM COATED ORAL at 09:07

## 2024-07-22 RX ADMIN — GABAPENTIN 600 MG: 300 CAPSULE ORAL at 09:07

## 2024-07-22 RX ADMIN — HYDRALAZINE HYDROCHLORIDE 25 MG: 25 TABLET ORAL at 04:07

## 2024-07-22 RX ADMIN — NORTRIPTYLINE HYDROCHLORIDE 50 MG: 25 CAPSULE ORAL at 09:07

## 2024-07-22 RX ADMIN — Medication 1 CAPSULE: at 09:07

## 2024-07-22 RX ADMIN — CYANOCOBALAMIN TAB 1000 MCG 1000 MCG: 1000 TAB at 09:07

## 2024-07-22 RX ADMIN — ATORVASTATIN CALCIUM 40 MG: 40 TABLET, FILM COATED ORAL at 09:07

## 2024-07-22 NOTE — HPI
Cortes Schroeder is a 64-year-old male with PMHx of type 2 diabetes on insulin pump, hypertension, morbid obesity, chronic pain syndrome, and multiple falls. Patient presented to [facility] on 7/18/24 for a fall. In the ED, he was found to have a right ankle fracture and a distal left radius fracture. S/p Right ankle ex-fix 07/18 and left distal radius ORIF 7/19/24. Ortho recommending NWB RLE and LUE weightbearing at the elbow, full ROM LUE. Per HM syncope appears is a combination of low Na/DEEPALI volme depletion and hypotension. Cardiac work up thus far negative.     Functional History: Patient lives in a [facility] with 3 steps to enter. Prior to admission, I. DME: none. Also, has a [place] multilevel home with a roommate.

## 2024-07-22 NOTE — SUBJECTIVE & OBJECTIVE
Interval history- he reports feeling well so far this AM. Had BM yesterday after mag citrate. Glucose with lows last night in 40s-60s he reports, he ate well last night with good intake for dinner and did have symptoms he usually has when was hypoglycemia and had glucose tabs, sandwich but took a little time to bring back up to 70s he reports. Has this happen occasionally at home as well but usually comes up quicker than it did last night. Was not similar to sensation he had when he fell as that was almost an out of body sensation he had then. Has not had that type of repeat issue since admit. Have not found any major insidious cause with cardiac/broad work ups so far. No hypoxemia, no carotid bruits on exam today, echo wnl. No chest pain. BP normo-slightly hypertensive after jaunt up yesterday AM unclear if true highs with cuff issues, and only got hydralazine prn once around 1 pm yesterday, cautious before considering adding back losartan with very low BP with EMS and highest cause of syncope right now is combination of low Na/DEEPALI volme depletion + hypotension that day working together to cause poor cerebral blood flow causing syncope transiently.   Recs for rehab and PM and R consult placed. Ortho plans for ORIF Friday, swelling improving to LE.  We talked about his close relationship with his grandmothers in younger years and how he has always had an ethereal type of aura to him at times and enjoyable history he discussed with me.           Review of patient's allergies indicates:   Allergen Reactions    Invokana [canagliflozin] Anaphylaxis    Percocet [oxycodone-acetaminophen] Nausea Only and Hallucinations    Biaxin [clarithromycin]     Hydrocodone Other (See Comments)     Dizzy/nausea/hallucinations    Sulfa (sulfonamide antibiotics) Nausea Only and Rash       No current facility-administered medications on file prior to encounter.     Current Outpatient Medications on File Prior to Encounter   Medication Sig     aspirin (ECOTRIN) 81 MG EC tablet Take 81 mg by mouth once daily.    cinnamon bark (CINNAMON) 500 mg capsule Take 500 mg by mouth 2 (two) times a day.    cyanocobalamin (VITAMIN B-12) 1000 MCG tablet Take 1,000 mcg by mouth once daily.    DULoxetine (CYMBALTA) 60 MG capsule Take 1 capsule (60 mg total) by mouth once daily.    emtricitabine-tenofovir 200-300 mg (TRUVADA) 200-300 mg Tab Take 1 tablet by mouth once daily.    fish oil-omega-3 fatty acids 300-1,000 mg capsule Take 1 capsule by mouth 2 (two) times daily.    gabapentin (NEURONTIN) 600 MG tablet Take 2 tablets (1,200 mg total) by mouth every evening.    HUMALOG U-100 INSULIN 100 unit/mL injection Use in insulin pump; max dose 150 units per day.    insulin pump cartridge (OMNIPOD DASH 5 PACK POD) Crtg Apply new pod to skin every 1.5 days    insulin pump controller (OMNIPOD DASH PDM KIT MISC) by Misc.(Non-Drug; Combo Route) route.    L-CITRULLINE MISC by Misc.(Non-Drug; Combo Route) route Daily.    losartan (COZAAR) 50 MG tablet TAKE 1 TABLET(50 MG) BY MOUTH EVERY DAY (Patient taking differently: Take 50 mg by mouth once daily. TAKE 1 TABLET(50 MG) BY MOUTH EVERY DAY)    magnesium oxide-Mg AA chelate (MG-PLUS-PROTEIN) 133 mg Tab Take 500 mg by mouth every evening.     MOUNJARO 10 mg/0.5 mL PnIj Inject 10 mg into the skin once a week.    nortriptyline (PAMELOR) 50 MG capsule Take 1 capsule (50 mg total) by mouth nightly.    rosuvastatin (CRESTOR) 10 MG tablet Take 10 mg by mouth every evening.    tiZANidine (ZANAFLEX) 4 MG tablet Take 2 tablets (8 mg total) by mouth nightly as needed (as needed for muscle spasms).    vitamin D 1000 units Tab Take 1,000 Units by mouth 2 (two) times daily.    blood-glucose sensor (DEXCOM G6 SENSOR) Omaira Change sensor every 10 days    blood-glucose transmitter (DEXCOM G6 TRANSMITTER) Omaira Change transmitter every 3 months    lancets Misc To check BG 4-7 times daily, to use with insurance preferred meter    methocarbamoL  (ROBAXIN) 500 MG Tab Take 1,000 mg by mouth 4 (four) times daily.    [DISCONTINUED] insulin aspart U-100 (NOVOLOG U-100 INSULIN ASPART) 100 unit/mL injection Use in insulin pump. Max daily dose 150 units.     Family History       Problem Relation (Age of Onset)    Alzheimer's disease Father    Cataracts Father    Heart attack Mother    Hypertension Father, Mother    Migraines Mother    Parkinsonism Father          Tobacco Use    Smoking status: Never    Smokeless tobacco: Never   Substance and Sexual Activity    Alcohol use: Yes     Alcohol/week: 1.0 standard drink of alcohol     Types: 1 Glasses of wine per week     Comment: occasionally    Drug use: No    Sexual activity: Not Currently     Partners: Male     Birth control/protection: Condom     Comment: 10/2/17      Review of Systems   Constitutional:  Positive for activity change.   Respiratory: Negative.     Cardiovascular: Negative.    Gastrointestinal: Negative.      Objective:     Vital Signs (Most Recent):  Temp: 98 °F (36.7 °C) (07/22/24 0744)  Pulse: 83 (07/22/24 1053)  Resp: 18 (07/22/24 0744)  BP: (!) 150/74 (07/22/24 0744)  SpO2: (!) 93 % (07/22/24 0744) Vital Signs (24h Range):  Temp:  [97.5 °F (36.4 °C)-98.3 °F (36.8 °C)] 98 °F (36.7 °C)  Pulse:  [] 83  Resp:  [17-20] 18  SpO2:  [93 %-98 %] 93 %  BP: (135-186)/(69-88) 150/74     Weight: 92.1 kg (203 lb)  Body mass index is 33.78 kg/m².     Physical Exam  Vitals and nursing note reviewed.   Constitutional:       General: He is not in acute distress.     Comments: Pleasant.    HENT:      Head: Normocephalic and atraumatic.   Eyes:      General: No scleral icterus.  Cardiovascular:      Rate and Rhythm: Normal rate and regular rhythm.      Comments: No carotid bruits  Pulmonary:      Effort: Pulmonary effort is normal. No respiratory distress.      Breath sounds: No wheezing.      Comments: Limited anterior exam  Abdominal:      General: Bowel sounds are normal.      Palpations: Abdomen is  "soft.   Musculoskeletal:      Cervical back: Neck supple.      Left lower leg: No edema.      Comments: RLE in ex fix, elevated with pin sites covered with gauze    LUE in splint and sling.  Insulin pump on right upper arm   Neurological:      Mental Status: He is alert.                Significant Labs: All pertinent labs within the past 24 hours have been reviewed.  ABGs: No results for input(s): "PH", "PCO2", "HCO3", "POCSATURATED", "BE", "TOTALHB", "COHB", "METHB", "O2HB", "POCFIO2", "PO2" in the last 48 hours.  CBC:   Recent Labs   Lab 07/21/24  0511   WBC 9.33   HGB 12.7*   HCT 40.0        CMP:   Recent Labs   Lab 07/21/24  0511      K 4.6      CO2 25   *   BUN 17   CREATININE 1.2   CALCIUM 8.5*   PROT 6.0   ALBUMIN 2.7*   BILITOT 0.4   ALKPHOS 97   AST 17   ALT 9*   ANIONGAP 7*     Cardiac Markers: No results for input(s): "CKMB", "MYOGLOBIN", "BNP", "TROPISTAT" in the last 48 hours.  Coagulation:   No results for input(s): "PT", "INR", "APTT" in the last 48 hours.    Lactic Acid: No results for input(s): "LACTATE" in the last 48 hours.  Troponin:   No results for input(s): "TROPONINI", "TROPONINIHS" in the last 48 hours.    Urine Studies:   No results for input(s): "COLORU", "APPEARANCEUA", "PHUR", "SPECGRAV", "PROTEINUA", "GLUCUA", "KETONESU", "BILIRUBINUA", "OCCULTUA", "NITRITE", "UROBILINOGEN", "LEUKOCYTESUR", "RBCUA", "WBCUA", "BACTERIA", "SQUAMEPITHEL", "HYALINECASTS" in the last 48 hours.    Invalid input(s): "WRIGHTSUR"      Significant Imaging: I have reviewed all pertinent imaging results/findings within the past 24 hours.    CT ANKLE (INCLUDING HINDFOOT) WITHOUT CONTRAST RIGHT; CT 3D RENDERING WO INDEPENDENT WORKSTATION     CLINICAL HISTORY:  2mm cuts with 3d recons;; FX;     TECHNIQUE:  Axial images of the right ankle were obtained at 0.625 mm intervals without administration of IV contrast.  Coronal and sagittal reformatted images were reviewed.  3D reconstructed images " were created on a separate workstation and reviewed.     COMPARISON:  Radiograph 07/18/2024     FINDINGS:  There is motion artifact.     Allowing for the above, there is a comminuted displaced fracture involving the distal aspect of the fibula noting several fracture fragments lie about the fracture plane.  There is comminuted fracture involving the distal aspect of the tibia, fracture planes extend to involve the medial malleolus and posterior aspect of the tibia noting several fracture fragments about the fracture plane.  The talus appears intact.  The calcaneus is intact.  The visualized portions of the kidney a forms and cuboid are intact.  The navicular is intact.  The proximal metatarsals are intact.  There is malalignment of the tibiotalar articulation noting lateral subluxation of the tibia in relationship to the talus.  There is edema about the fracture sites.  There is vascular calcification.  There are degenerative changes of the calcaneus.     Impression:     1. Fractures of the distal tibia and fibula noting tibiotalar subluxation as described.        Electronically signed by:Carlos Espinoza MD  Date:                                            07/18/2024  Time:                                           18:19           Exam Ended: 07/18/24 17:58 CDT Last Resulted: 07/18/24 18:19 CDT                 XR WRIST COMPLETE 3 VIEWS LEFT     CLINICAL HISTORY:  pain;     TECHNIQUE:  PA, lateral, and oblique views of the left wrist were performed.     COMPARISON:  None     FINDINGS:  Three views left wrist.     There is osteopenia.  There are degenerative changes of the wrist.  There is fracture involving the medial aspect of the distal radius, extending to the radiocarpal articulation with impaction.  There is edema about the dorsal aspect of the wrist.  The distal ulna appears intact.     Impression:     1. Distal radial fracture as described.        Electronically signed by:Carlos Espinoza MD  Date:                                             07/18/2024  Time:                                           18:48

## 2024-07-22 NOTE — PLAN OF CARE
07/22/24 1357   Post-Acute Status   Post-Acute Authorization Placement   Post-Acute Placement Status Pending payor review/awaiting authorization (if required)   Discharge Plan   Discharge Plan A Return to nursing home     Patient Accepted to [facility], submitting for authorization. ELIAS informed pt at bedside, agreeable. ELIAS provided the patient [facility] Management dept (Disability) office contact information. ELIAS to follow      .m

## 2024-07-22 NOTE — SUBJECTIVE & OBJECTIVE
"Interval HPI:   Overnight events: POD 3. Remains in 541A. NAEON. BG variable on current home insulin pump (). Hypoglycemia noted yesterday evening (BG 62) requiring 16g oral glucose tabs. Diet diabetic  Calorie    Eatin%  Nausea: No  Hypoglycemia and intervention: No  Fever: No  TPN and/or TF: No  If yes, type of TF/TPN and rate: n/a    BP (!) 150/74 (BP Location: Right arm, Patient Position: Lying)   Pulse 84   Temp 98 °F (36.7 °C) (Oral)   Resp 18   Ht 5' 5" (1.651 m)   Wt 92.1 kg (203 lb)   SpO2 (!) 93%   BMI 33.78 kg/m²     Labs Reviewed and Include    No results for input(s): "GLU", "CALCIUM", "ALBUMIN", "PROT", "NA", "K", "CO2", "CL", "BUN", "CREATININE", "ALKPHOS", "ALT", "AST", "BILITOT" in the last 24 hours.  Lab Results   Component Value Date    WBC 9.33 2024    HGB 12.7 (L) 2024    HCT 40.0 2024    MCV 91 2024     2024     Recent Labs   Lab 24  1617   TSH 6.243*   FREET4 0.98     Lab Results   Component Value Date    HGBA1C 9.9 (H) 2024       Nutritional status:   Body mass index is 33.78 kg/m².  Lab Results   Component Value Date    ALBUMIN 2.7 (L) 2024    ALBUMIN 2.8 (L) 2024    ALBUMIN 3.0 (L) 2024     Lab Results   Component Value Date    PREALBUMIN 13 (L) 2024       Estimated Creatinine Clearance: 65.7 mL/min (based on SCr of 1.2 mg/dL).    Accu-Checks  Recent Labs     24  0925 24  1431 24  1535 24  0750 24  1141 24  1642 24  1614 24  2031 24  0818   POCTGLUCOSE 171* 264* 249* 122* 132* 92 252* 62* 173*       Current Medications and/or Treatments Impacting Glycemic Control  Immunotherapy:    Immunosuppressants       None          Steroids:   Hormones (From admission, onward)      Start     Stop Route Frequency Ordered    24 0145  melatonin tablet 6 mg         -- Oral Nightly PRN 24 0048          Pressors:    Autonomic Drugs (From " admission, onward)      None          Hyperglycemia/Diabetes Medications:   Antihyperglycemics (From admission, onward)      Start     Stop Route Frequency Ordered    07/19/24 1645  insulin lispro insulin pump from home 50 Units        Question Answer Comment   Target number 120    Basal Rate #1 2.3    Basal rate #1 time 7535-8404    Basal Rate #2 1.55    Basal rate #2 time 2761-5472        -- SubQ Continuous 07/19/24 1534    07/19/24 1632  insulin lispro insulin pump from home 1-20 Units        Question Answer Comment   Target number 120    Carbohydrate coverage #1 1:2    Carbohydrate coverage #1 time 0585-1720    Carbohydrate coverage #2 1:3    Carbohydrate coverage #2 time 4117-4091    Sensitivity #1 1:20    Sensitivity #1 time 9934-2610        -- SubQ As needed (PRN) 07/19/24 1534

## 2024-07-22 NOTE — CONSULTS
Inpatient consult to Physical Medicine Rehab  Consult performed by: Aubrie Hurt NP  Consult ordered by: Maria Del Rosario Medina MD  Reason for consult: Rehab      Consult received.     ISELA Hollingsworth, FNP-C  Physical Medicine & Rehabilitation   07/22/2024

## 2024-07-22 NOTE — PROGRESS NOTES
[facility]  Orthopedics  Progress Note    Patient Name: Cortes Schroeder  MRN: [id number]  Admission Date: 7/18/2024  Hospital Length of Stay: 2 days  Attending Provider: Maria Del Rosario Medina MD  Primary Care Provider: Andrew Sinclair Jr., MD  Follow-up For: Procedure(s) (LRB):  ORIF, FRACTURE, RADIUS, DISTAL - LEFT (Left)    Post-Operative Day: 3 Days Post-Op  Subjective:     Principal Problem:Closed trimalleolar fracture of right ankle    Principal Orthopedic Problem: Right ankle trimal & Left distal radius fracture s/p Right ankle ex-fix 07/18, L DR ORIF 07/19    Interval History: Pt seen and examined at bedside. NAEON, VSS, AF. Pain controlled. Denies fevers, chills, chest pain, SOB, N/V/D.         Review of patient's allergies indicates:   Allergen Reactions    Invokana [canagliflozin] Anaphylaxis    Percocet [oxycodone-acetaminophen] Nausea Only and Hallucinations    Biaxin [clarithromycin]     Hydrocodone Other (See Comments)     Dizzy/nausea/hallucinations    Sulfa (sulfonamide antibiotics) Nausea Only and Rash       Current Facility-Administered Medications   Medication    acetaminophen tablet 650 mg    acetaminophen tablet 650 mg    aluminum-magnesium hydroxide-simethicone 200-200-20 mg/5 mL suspension 30 mL    aspirin EC tablet 81 mg    atorvastatin tablet 40 mg    bisacodyL suppository 10 mg    celecoxib capsule 200 mg    cyanocobalamin tablet 1,000 mcg    dextrose 10% bolus 125 mL 125 mL    dextrose 10% bolus 125 mL 125 mL    dextrose 10% bolus 250 mL 250 mL    dextrose 10% bolus 250 mL 250 mL    DULoxetine DR capsule 60 mg    emtricitabine-tenofovir 200-300 mg per tablet 1 tablet    gabapentin capsule 600 mg    And    gabapentin capsule 1,200 mg    glucagon (human recombinant) injection 1 mg    glucose chewable tablet 16 g    glucose chewable tablet 24 g    hydrALAZINE tablet 25 mg    insulin lispro insulin pump from home 1-20 Units    insulin lispro insulin pump from home 50 Units     "magnesium oxide split tablet 200 mg    melatonin tablet 6 mg    methocarbamoL tablet 500 mg    morphine tablet 15 mg    naloxone 0.4 mg/mL injection 0.02 mg    nortriptyline capsule 50 mg    omega 3-dha-epa-fish oil capsule 1 capsule    ondansetron injection 4 mg    polyethylene glycol packet 17 g    prochlorperazine injection Soln 5 mg    senna-docusate 8.6-50 mg per tablet 1 tablet    sodium chloride 0.9% flush 10 mL    vitamin D 1000 units tablet 1,000 Units     Objective:     Vital Signs (Most Recent):  Temp: 97.8 °F (36.6 °C) (07/22/24 1216)  Pulse: 83 (07/22/24 1216)  Resp: 17 (07/22/24 1216)  BP: (!) 172/81 (07/22/24 1216)  SpO2: (!) 94 % (07/22/24 1216) Vital Signs (24h Range):  Temp:  [97.5 °F (36.4 °C)-98.3 °F (36.8 °C)] 97.8 °F (36.6 °C)  Pulse:  [73-99] 83  Resp:  [17-20] 17  SpO2:  [93 %-98 %] 94 %  BP: (135-172)/(69-84) 172/81     Weight: 92.1 kg (203 lb)  Height: 5' 5" (165.1 cm)  Body mass index is 33.78 kg/m².      Intake/Output Summary (Last 24 hours) at 7/22/2024 1252  Last data filed at 7/22/2024 0005  Gross per 24 hour   Intake --   Output 300 ml   Net -300 ml        Ortho/SPM Exam     AAOx4  NAD  Reg rate  No increased WOB    RLE    Ex-fix in place. Pin sites covered in gauze. Pin site care.  Moderate swelling present  Compartments soft/compressible  SILT throughout  Fires TA/EHL/Gastroc/FHL   DP pulse 2+. Brisk cap refill      LUE    Arm placed in a sling  Compartments soft/compressible  Reduced sensation to light touch.  Able to make ok sign, thumb up or cross fingers.   Brisk cap refill, WWP      Significant Labs: All pertinent labs within the past 24 hours have been reviewed.    Significant Imaging: I have reviewed and interpreted all pertinent imaging results/findings.  Assessment/Plan:     * Closed trimalleolar fracture of right ankle  Cortes Schroeder is a 64 y.o. male presenting with history of DM2 presents with right trimalleolar ankle fracture with skin tenting & non-displaced left " distal radius fracture. No signs of ecchymosis or petechial over the ankle. He is neuro intact. He was closed reduced and splinted in the ED. Ankle was grossly unstable in a splint. He is admitted to  for evaluation of syncopal episodes. He is s/p right ankle ex-fix on 07/19, L distal radius 07/19. Plan for definitive fixation when soft tissue is amenable to surgery.    - NWB RLE. LUE weightbearing at the elbow. Full ROM LUE  - DVT Prophylaxis: SCDs at all times while in bed. ASA.  - Pain control: multimodal pain management  - PT/OT: Eval & treat NWB RLE           Closed fracture of left distal radius  He is s/p L DR ORIF on 07/19. Able to make Ok sign, cross fingers and give thumb up.     WBAT through the elbow  Full range of motion          Andreas Castaneda MD  Orthopedics  [facility]

## 2024-07-22 NOTE — NURSING
Patient reports a blood glucose of 52, he is asymptomatic, oral glucose tabs given to patient, will continue to monitor

## 2024-07-22 NOTE — NURSING
Nurses Note -- 4 Eyes      7/22/2024   11:41 AM      Skin assessed during: Daily Assessment      [x] No Altered Skin Integrity Present    []Prevention Measures Documented      [] Yes- Altered Skin Integrity Present or Discovered   [] LDA Added if Not in Epic (Describe Wound)   [] New Altered Skin Integrity was Present on Admit and Documented in LDA   [] Wound Image Taken    Wound Care Consulted? No    Attending Nurse:  Yamilex Hoffmann RN/Staff Member:  BRITTANY Amos

## 2024-07-22 NOTE — PLAN OF CARE
[facility]  Initial Discharge Assessment       Primary Care Provider: Andrew Sinclair Jr., MD    Admission Diagnosis: Dizziness [R42]  Lightheadedness [R42]  Fracture [T14.8XXA]    Admission Date: 7/18/2024  Expected Discharge Date: 7/23/2024    Transition of Care Barriers: None    Payor: [facility] / Plan: [facility] CHOICE PLUS / Product Type: Commercial /     Extended Emergency Contact Information  Primary Emergency Contact: Juan Roberts   [place]  Mobile Phone: [phone]  Relation: Friend    Discharge Plan A: Rehab  Discharge Plan B: Skilled Nursing Facility      [facility] [employer], [place] - [address] [place] [address]  Phone: [phone] Fax: [phone]    [id number] - [place], [place] - [address]  [address]  [place] [address]  Phone: [phone] Fax: [phone]      Initial Assessment (most recent)       Adult Discharge Assessment - 07/22/24 0942          Discharge Assessment    Assessment Type Discharge Planning Assessment     Confirmed/corrected address, phone number and insurance Yes     Confirmed Demographics Correct on Facesheet     Source of Information patient     Communicated JAYLEEN with patient/caregiver Yes     People in Home alone     Do you expect to return to your current living situation? Yes     Do you have help at home or someone to help you manage your care at home? No     Prior to hospitilization cognitive status: Alert/Oriented     Current cognitive status: Alert/Oriented     Walking or Climbing Stairs Difficulty no     Dressing/Bathing Difficulty no     Do you have any problems with: Errands/Grocery;Needs other help     Specify other help Transport     Home Accessibility not wheelchair accessible     Home Layout Able to live on 1st floor     Equipment Currently Used at Home none     Readmission within 30 days? No     Patient  currently being followed by outpatient case management? No     Do you currently have service(s) that help you manage your care at home? No     Do you take prescription medications? Yes     Do you have prescription coverage? Yes     Do you have any problems affording any of your prescribed medications? No     Is the patient taking medications as prescribed? yes     Who is going to help you get home at discharge? No Family/Friends in [facility] area     How do you get to doctors appointments? car, drives self;family or friend will provide     Are you on dialysis? No     Do you take coumadin? No     Discharge Plan A Rehab     Discharge Plan B Skilled Nursing Facility     DME Needed Upon Discharge  other (see comments)   TBD    Discharge Plan discussed with: Patient     Transition of Care Barriers None                    SW completed discharge planning assessment with the patient at bedside. SW verified demographic information listed on the pt.'s Face sheet. Pt reports living in [facility] ( [address] [place] [address]) during the week days and Home in [place] during the weekends. Pt reports that he has limited family support in the [facility] area, my need assistance with transportation. Pt agreeable to Rehab placement, would like referrals sent in [facility] and [place] area. SW to follow.    Discharge Plan A and Plan B have been determined by review of patient's clinical status, future medical and therapeutic needs, and coverage/benefits for post-acute care in coordination with multidisciplinary team members.    Aida Rosa, MICHOACANO  Case Management   [facility]   Ext. [phone]

## 2024-07-22 NOTE — SUBJECTIVE & OBJECTIVE
Principal Problem:Closed trimalleolar fracture of right ankle    Principal Orthopedic Problem: Right ankle trimal & Left distal radius fracture s/p Right ankle ex-fix 07/18, L DR ORIF 07/19    Interval History: Pt seen and examined at bedside. ABIGAIL, CHILO, AF. Pain controlled. Denies fevers, chills, chest pain, SOB, N/V/D.         Review of patient's allergies indicates:   Allergen Reactions    Invokana [canagliflozin] Anaphylaxis    Percocet [oxycodone-acetaminophen] Nausea Only and Hallucinations    Biaxin [clarithromycin]     Hydrocodone Other (See Comments)     Dizzy/nausea/hallucinations    Sulfa (sulfonamide antibiotics) Nausea Only and Rash       Current Facility-Administered Medications   Medication    acetaminophen tablet 650 mg    acetaminophen tablet 650 mg    aluminum-magnesium hydroxide-simethicone 200-200-20 mg/5 mL suspension 30 mL    aspirin EC tablet 81 mg    atorvastatin tablet 40 mg    bisacodyL suppository 10 mg    celecoxib capsule 200 mg    cyanocobalamin tablet 1,000 mcg    dextrose 10% bolus 125 mL 125 mL    dextrose 10% bolus 125 mL 125 mL    dextrose 10% bolus 250 mL 250 mL    dextrose 10% bolus 250 mL 250 mL    DULoxetine DR capsule 60 mg    emtricitabine-tenofovir 200-300 mg per tablet 1 tablet    gabapentin capsule 600 mg    And    gabapentin capsule 1,200 mg    glucagon (human recombinant) injection 1 mg    glucose chewable tablet 16 g    glucose chewable tablet 24 g    hydrALAZINE tablet 25 mg    insulin lispro insulin pump from home 1-20 Units    insulin lispro insulin pump from home 50 Units    magnesium oxide split tablet 200 mg    melatonin tablet 6 mg    methocarbamoL tablet 500 mg    morphine tablet 15 mg    naloxone 0.4 mg/mL injection 0.02 mg    nortriptyline capsule 50 mg    omega 3-dha-epa-fish oil capsule 1 capsule    ondansetron injection 4 mg    polyethylene glycol packet 17 g    prochlorperazine injection Soln 5 mg    senna-docusate 8.6-50 mg per tablet 1 tablet    sodium  "chloride 0.9% flush 10 mL    vitamin D 1000 units tablet 1,000 Units     Objective:     Vital Signs (Most Recent):  Temp: 97.8 °F (36.6 °C) (07/22/24 1216)  Pulse: 83 (07/22/24 1216)  Resp: 17 (07/22/24 1216)  BP: (!) 172/81 (07/22/24 1216)  SpO2: (!) 94 % (07/22/24 1216) Vital Signs (24h Range):  Temp:  [97.5 °F (36.4 °C)-98.3 °F (36.8 °C)] 97.8 °F (36.6 °C)  Pulse:  [73-99] 83  Resp:  [17-20] 17  SpO2:  [93 %-98 %] 94 %  BP: (135-172)/(69-84) 172/81     Weight: 92.1 kg (203 lb)  Height: 5' 5" (165.1 cm)  Body mass index is 33.78 kg/m².      Intake/Output Summary (Last 24 hours) at 7/22/2024 1252  Last data filed at 7/22/2024 0005  Gross per 24 hour   Intake --   Output 300 ml   Net -300 ml        Ortho/SPM Exam     AAOx4  NAD  Reg rate  No increased WOB    RLE    Ex-fix in place. Pin sites covered in gauze. Pin site care.  Moderate swelling present  Compartments soft/compressible  SILT throughout  Fires TA/EHL/Gastroc/FHL   DP pulse 2+. Brisk cap refill      LUE    Arm placed in a sling  Compartments soft/compressible  Reduced sensation to light touch.  Able to make ok sign, thumb up or cross fingers.   Brisk cap refill, WWP      Significant Labs: All pertinent labs within the past 24 hours have been reviewed.    Significant Imaging: I have reviewed and interpreted all pertinent imaging results/findings.  "

## 2024-07-22 NOTE — PLAN OF CARE
Problem: Adult Inpatient Plan of Care  Goal: Plan of Care Review  Outcome: Progressing     Problem: Adult Inpatient Plan of Care  Goal: Optimal Comfort and Wellbeing  Outcome: Progressing     Problem: Diabetes Comorbidity  Goal: Blood Glucose Level Within Targeted Range  Outcome: Progressing     Problem: Wound  Goal: Skin Health and Integrity  Outcome: Progressing     Problem: Skin Injury Risk Increased  Goal: Skin Health and Integrity  Outcome: Progressing     Problem: Fall Injury Risk  Goal: Absence of Fall and Fall-Related Injury  Outcome: Progressing   Patient lying supine in bed watching televison. NAD noted. Safety measures in place.

## 2024-07-22 NOTE — PROGRESS NOTES
[facility] Medicine  Progress Note    Patient Name: Cortes Schroeder  MRN: [id number]  Patient Class: IP- Inpatient   Admission Date: 7/18/2024  Length of Stay: 2 days  Attending Physician: Maria Del Rosario Medina MD  Primary Care Provider: Andrew Sinclair Jr., MD        Subjective:     Principal Problem:Closed trimalleolar fracture of right ankle        HPI:  64-year-old man with type 2 diabetes on insulin pump, hypertension, morbid obesity, chronic pain syndrome presents to the emergency department after multiple falls with traumatic injury.  He reports while walking into his [employer] building he suffered a series of falls today 3-4 total, prior to the 1st fall he experienced dizziness while walking toward his building and then found himself on the ground, he was able to get up under his own power and then fell again near the elevator, and 2 more times with the last fall occurring as he got into his apartment, he believes he suffered ankle injury on the last fall as he was unable to get back up or bear weight.    He is unsure if he had loss of consciousness he denies any other recent falls.  On ED workup he was found with a right ankle fracture and a distal left radius fracture.  He was taken from the ED to the OR this evening for surgical repair of the ankle fracture, I saw patient in the PACU area after his initial operation, he commented on having some postoperative pain, reported history of intolerance to oxycodone and hydrocodone as they caused hallucinations and psychotic symptoms, we will avoid use.    He uses insulin pump - omnipod changed every 1.5 days.  He is still wearing insulin pump thru tonights surgery.  PACU RN to help him charge phone which is how he controls insulin pump      Overview/Hospital Course:  No notes on file    Interval history- he reports feeling well so far this AM. Had BM yesterday after mag citrate. Glucose with lows last night in 40s-60s he reports, he ate well last  night with good intake for dinner and did have symptoms he usually has when was hypoglycemia and had glucose tabs, sandwich but took a little time to bring back up to 70s he reports. Has this happen occasionally at home as well but usually comes up quicker than it did last night. Was not similar to sensation he had when he fell as that was almost an out of body sensation he had then. Has not had that type of repeat issue since admit. Have not found any major insidious cause with cardiac/broad work ups so far. No hypoxemia, no carotid bruits on exam today, echo wnl. No chest pain. BP normo-slightly hypertensive after jaunt up yesterday AM unclear if true highs with cuff issues, and only got hydralazine prn once around 1 pm yesterday, cautious before considering adding back losartan with very low BP with EMS and highest cause of syncope right now is combination of low Na/DEEPALI volme depletion + hypotension that day working together to cause poor cerebral blood flow causing syncope transiently.   Recs for rehab and PM and R consult placed. Ortho plans for ORIF Friday, swelling improving to LE.  We talked about his close relationship with his grandmothers in younger years and how he has always had an ethereal type of aura to him at times and enjoyable history he discussed with me.           Review of patient's allergies indicates:   Allergen Reactions    Invokana [canagliflozin] Anaphylaxis    Percocet [oxycodone-acetaminophen] Nausea Only and Hallucinations    Biaxin [clarithromycin]     Hydrocodone Other (See Comments)     Dizzy/nausea/hallucinations    Sulfa (sulfonamide antibiotics) Nausea Only and Rash       No current facility-administered medications on file prior to encounter.     Current Outpatient Medications on File Prior to Encounter   Medication Sig    aspirin (ECOTRIN) 81 MG EC tablet Take 81 mg by mouth once daily.    cinnamon bark (CINNAMON) 500 mg capsule Take 500 mg by mouth 2 (two) times a day.     cyanocobalamin (VITAMIN B-12) 1000 MCG tablet Take 1,000 mcg by mouth once daily.    DULoxetine (CYMBALTA) 60 MG capsule Take 1 capsule (60 mg total) by mouth once daily.    emtricitabine-tenofovir 200-300 mg (TRUVADA) 200-300 mg Tab Take 1 tablet by mouth once daily.    fish oil-omega-3 fatty acids 300-1,000 mg capsule Take 1 capsule by mouth 2 (two) times daily.    gabapentin (NEURONTIN) 600 MG tablet Take 2 tablets (1,200 mg total) by mouth every evening.    HUMALOG U-100 INSULIN 100 unit/mL injection Use in insulin pump; max dose 150 units per day.    insulin pump cartridge (OMNIPOD DASH 5 PACK POD) Crtg Apply new pod to skin every 1.5 days    insulin pump controller (OMNIPOD DASH PDM KIT MISC) by Misc.(Non-Drug; Combo Route) route.    L-CITRULLINE MISC by Misc.(Non-Drug; Combo Route) route Daily.    losartan (COZAAR) 50 MG tablet TAKE 1 TABLET(50 MG) BY MOUTH EVERY DAY (Patient taking differently: Take 50 mg by mouth once daily. TAKE 1 TABLET(50 MG) BY MOUTH EVERY DAY)    magnesium oxide-Mg AA chelate (MG-PLUS-PROTEIN) 133 mg Tab Take 500 mg by mouth every evening.     MOUNJARO 10 mg/0.5 mL PnIj Inject 10 mg into the skin once a week.    nortriptyline (PAMELOR) 50 MG capsule Take 1 capsule (50 mg total) by mouth nightly.    rosuvastatin (CRESTOR) 10 MG tablet Take 10 mg by mouth every evening.    tiZANidine (ZANAFLEX) 4 MG tablet Take 2 tablets (8 mg total) by mouth nightly as needed (as needed for muscle spasms).    vitamin D 1000 units Tab Take 1,000 Units by mouth 2 (two) times daily.    blood-glucose sensor (DEXCOM G6 SENSOR) Omaira Change sensor every 10 days    blood-glucose transmitter (DEXCOM G6 TRANSMITTER) Omaira Change transmitter every 3 months    lancets Misc To check BG 4-7 times daily, to use with insurance preferred meter    methocarbamoL (ROBAXIN) 500 MG Tab Take 1,000 mg by mouth 4 (four) times daily.    [DISCONTINUED] insulin aspart U-100 (NOVOLOG U-100 INSULIN ASPART) 100 unit/mL injection Use  in insulin pump. Max daily dose 150 units.     Family History       Problem Relation (Age of Onset)    Alzheimer's disease Father    Cataracts Father    Heart attack Mother    Hypertension Father, Mother    Migraines Mother    Parkinsonism Father          Tobacco Use    Smoking status: Never    Smokeless tobacco: Never   Substance and Sexual Activity    Alcohol use: Yes     Alcohol/week: 1.0 standard drink of alcohol     Types: 1 Glasses of wine per week     Comment: occasionally    Drug use: No    Sexual activity: Not Currently     Partners: Male     Birth control/protection: Condom     Comment: 10/2/17      Review of Systems   Constitutional:  Positive for activity change.   Respiratory: Negative.     Cardiovascular: Negative.    Gastrointestinal: Negative.      Objective:     Vital Signs (Most Recent):  Temp: 98 °F (36.7 °C) (07/22/24 0744)  Pulse: 83 (07/22/24 1053)  Resp: 18 (07/22/24 0744)  BP: (!) 150/74 (07/22/24 0744)  SpO2: (!) 93 % (07/22/24 0744) Vital Signs (24h Range):  Temp:  [97.5 °F (36.4 °C)-98.3 °F (36.8 °C)] 98 °F (36.7 °C)  Pulse:  [] 83  Resp:  [17-20] 18  SpO2:  [93 %-98 %] 93 %  BP: (135-186)/(69-88) 150/74     Weight: 92.1 kg (203 lb)  Body mass index is 33.78 kg/m².     Physical Exam  Vitals and nursing note reviewed.   Constitutional:       General: He is not in acute distress.     Comments: Pleasant.    HENT:      Head: Normocephalic and atraumatic.   Eyes:      General: No scleral icterus.  Cardiovascular:      Rate and Rhythm: Normal rate and regular rhythm.      Comments: No carotid bruits  Pulmonary:      Effort: Pulmonary effort is normal. No respiratory distress.      Breath sounds: No wheezing.      Comments: Limited anterior exam  Abdominal:      General: Bowel sounds are normal.      Palpations: Abdomen is soft.   Musculoskeletal:      Cervical back: Neck supple.      Left lower leg: No edema.      Comments: RLE in ex fix, elevated with pin sites covered with  "gauze    LUE in splint and sling.  Insulin pump on right upper arm   Neurological:      Mental Status: He is alert.                Significant Labs: All pertinent labs within the past 24 hours have been reviewed.  ABGs: No results for input(s): "PH", "PCO2", "HCO3", "POCSATURATED", "BE", "TOTALHB", "COHB", "METHB", "O2HB", "POCFIO2", "PO2" in the last 48 hours.  CBC:   Recent Labs   Lab 07/21/24  0511   WBC 9.33   HGB 12.7*   HCT 40.0        CMP:   Recent Labs   Lab 07/21/24  0511      K 4.6      CO2 25   *   BUN 17   CREATININE 1.2   CALCIUM 8.5*   PROT 6.0   ALBUMIN 2.7*   BILITOT 0.4   ALKPHOS 97   AST 17   ALT 9*   ANIONGAP 7*     Cardiac Markers: No results for input(s): "CKMB", "MYOGLOBIN", "BNP", "TROPISTAT" in the last 48 hours.  Coagulation:   No results for input(s): "PT", "INR", "APTT" in the last 48 hours.    Lactic Acid: No results for input(s): "LACTATE" in the last 48 hours.  Troponin:   No results for input(s): "TROPONINI", "TROPONINIHS" in the last 48 hours.    Urine Studies:   No results for input(s): "COLORU", "APPEARANCEUA", "PHUR", "SPECGRAV", "PROTEINUA", "GLUCUA", "KETONESU", "BILIRUBINUA", "OCCULTUA", "NITRITE", "UROBILINOGEN", "LEUKOCYTESUR", "RBCUA", "WBCUA", "BACTERIA", "SQUAMEPITHEL", "HYALINECASTS" in the last 48 hours.    Invalid input(s): "WRIGHTSUR"      Significant Imaging: I have reviewed all pertinent imaging results/findings within the past 24 hours.    CT ANKLE (INCLUDING HINDFOOT) WITHOUT CONTRAST RIGHT; CT 3D RENDERING WO INDEPENDENT WORKSTATION     CLINICAL HISTORY:  2mm cuts with 3d recons;; FX;     TECHNIQUE:  Axial images of the right ankle were obtained at 0.625 mm intervals without administration of IV contrast.  Coronal and sagittal reformatted images were reviewed.  3D reconstructed images were created on a separate workstation and reviewed.     COMPARISON:  Radiograph 07/18/2024     FINDINGS:  There is motion artifact.     Allowing for the " above, there is a comminuted displaced fracture involving the distal aspect of the fibula noting several fracture fragments lie about the fracture plane.  There is comminuted fracture involving the distal aspect of the tibia, fracture planes extend to involve the medial malleolus and posterior aspect of the tibia noting several fracture fragments about the fracture plane.  The talus appears intact.  The calcaneus is intact.  The visualized portions of the kidney a forms and cuboid are intact.  The navicular is intact.  The proximal metatarsals are intact.  There is malalignment of the tibiotalar articulation noting lateral subluxation of the tibia in relationship to the talus.  There is edema about the fracture sites.  There is vascular calcification.  There are degenerative changes of the calcaneus.     Impression:     1. Fractures of the distal tibia and fibula noting tibiotalar subluxation as described.        Electronically signed by:Carlos Espinoza MD  Date:                                            07/18/2024  Time:                                           18:19           Exam Ended: 07/18/24 17:58 CDT Last Resulted: 07/18/24 18:19 CDT                 XR WRIST COMPLETE 3 VIEWS LEFT     CLINICAL HISTORY:  pain;     TECHNIQUE:  PA, lateral, and oblique views of the left wrist were performed.     COMPARISON:  None     FINDINGS:  Three views left wrist.     There is osteopenia.  There are degenerative changes of the wrist.  There is fracture involving the medial aspect of the distal radius, extending to the radiocarpal articulation with impaction.  There is edema about the dorsal aspect of the wrist.  The distal ulna appears intact.     Impression:     1. Distal radial fracture as described.        Electronically signed by:Carlos Espinoza MD  Date:                                            07/18/2024  Time:                                           18:48      Assessment/Plan:      * Closed trimalleolar fracture  of right ankle  Sustained in fall with concern for presyncope episodes with dizziness and blackout type episode. Possible poor intake/dehydration contributor with only banana eaten that day before episode and hyponatremia/DEEPALI on admit with improevments now as well as hypotension as BP 70s in the field   -maintain on telemetry, echo normal with only mild elevation in PAP  -trop negative, UA negative. Tsh mildly up but free T4 normal.  -s/p ex fix on 7/18 PM with dr velez and pin site care BID, elevate and ice. Fixation likely 7/26 pending swellign ipmrovements. Multimodal pain medications. NWB to RLE, PT/OT consulted. Recs high intensity therapy, with UE and LE fx and likely  good rehab candidate  and PM and R consulted   Will also likely need FMLA Paperwork as likely javier have extended NWB time for suspected at least 2 months but will f/u ortho recs post ORIF later this week but typical times for this type of injury at minimum usually  -bowel regimen  -ASA BID for ppx        Syncope  Workup with echo normal, cont tele  Culprits may have been dehydration with deepali/hyponatremia on admit, Hypotension with 70s systolic with EMS he reports (hold losartan). No signs of hypoglycemia as contributor  -other labs stable on work up      Multiple falls  Etiology unclear  -pt was not hypoglycemic on arrival  -possible had syncope with 1st fall  -check ECHO  -keep  on cardiac monitoring      Closed fracture of left distal radius  Secondary to fall  S/p ORIF on 7/19 with dr velez with ORIF and Synthes distal radius VA volar plate and screws   -recs for PT/OT training with a platform walker for gait with weight-bearing across the left elbow and minimal gripping of the post. Range of motion of the fingers on the left hand.   Multimodal pain management limiting narcotics. Tight blood glucose control.   -asa BID for ppx  -sling and splint in place      Abnormal thyroid blood test  Free T4 nromal with TSH 6. Repeat in 6 weeks to  document stability      Hyponatremia  Given IV fluids in ED and on 7/19 post op - with improved and normalized now  Recent Labs   Lab 07/21/24  0511        He reports has been a chronic issue in the past and gone to the ER by PCP referral for this and given saline with improvements  Discussed on 7/21 if strenous activity/working out/sweating once recovered from this injury, would use isotonic fluid to hydrate like sugar free gatorade/pedialyte as can sometimes dilute with straight water alone as recovery fluid. Can also have pseudohypnatremia if glucose is highly elevated    Uncontrolled type 2 diabetes mellitus with hyperglycemia  Patient's FSGs are uncontrolled due to hyperglycemia on current medication regimen.  Last A1c reviewed-   Lab Results   Component Value Date    LABA1C 10.8 (H) 08/15/2016    HGBA1C 9.9 (H) 07/18/2024     Most recent fingerstick glucose reviewed-   Recent Labs   Lab 07/21/24  1614 07/21/24  2031 07/22/24  0818   POCTGLUCOSE 252* 62* 173*       Current correctional scale  Medium  Maintain anti-hyperglycemic dose as follows-   Antihyperglycemics (From admission, onward)      Start     Stop Route Frequency Ordered    07/19/24 1645  insulin lispro insulin pump from home 50 Units        Question Answer Comment   Target number 120    Basal Rate #1 2.3    Basal rate #1 time 2130-3477    Basal Rate #2 1.55    Basal rate #2 time 8513-6063        -- SubQ Continuous 07/19/24 1534    07/19/24 1632  insulin lispro insulin pump from home 1-20 Units        Question Answer Comment   Target number 120    Carbohydrate coverage #1 1:2    Carbohydrate coverage #1 time 8296-8351    Carbohydrate coverage #2 1:3    Carbohydrate coverage #2 time [phone]    Sensitivity #1 1:20    Sensitivity #1 time [id number]        -- SubQ As needed (PRN) 07/19/24 1534          Hold Oral hypoglycemics while patient is in the hospital.  -has been workign with outpatient endocrine MD long term. Has been diabetic since  1995.  -DM diet, insulin pump in plaec with endo managing  -hold mounjaro until all surgeries completed  Lows overnight 7/22 to 40s-60s and requried sandwich, glucose tabs to improve. He reports happens at home every now and then, oral intake was good last night, so will f/u endo adjustments      Hypertension, essential  Chronic, controlled. Latest blood pressure and vitals reviewed-     Temp:  [97.5 °F (36.4 °C)-98.3 °F (36.8 °C)]   Pulse:  []   Resp:  [17-20]   BP: (135-186)/(69-88)   SpO2:  [93 %-98 %] .   Home meds for hypertension were reviewed and noted below.   Hypertension Medications               losartan (COZAAR) 50 MG tablet TAKE 1 TABLET(50 MG) BY MOUTH EVERY DAY            -reported to be 70s systolic with EMS and holding losartan further as possible contributor to presyncopal episode. Normotensive now on 7/20 but ticking up 7/21, possible technique as difficulty getting BP as left arm post op and insulin pump in right arm so using right wrist, right forearm. Prn hdyralazine added for now as overly cautious about overcorrecting before resuming home losartan given above  Received hydralazine once 7/21 around 1 pm with improved BPs more 130s-150s now, so trend, and if spikes again can consider losartan, cuatious as above    Obesity  Body mass index is 33.78 kg/m². Morbid obesity complicates all aspects of disease management from diagnostic modalities to treatment.   -pt on mounjaro as outpatient. And has been losing weight with this. Hold mounjaro until all surgerise completed as risk of aspiration of stomach contents with anesthesia so can resume once out of hospital and discussed with him         Insulin pump status    -endocrinology consulted and appreciate assistance. Roommate brought home pump and supplies and hooked up 7/19 PM. He plans to bring extra back up supplies further on 7/21..  Patient states humalog brand short acting insulin does not work for him or produce proper effect.  At this  time he would prefer to maintain his insulin pump.   -some hypoglycemia overnight 7/22 and adjusting, oral intake has been maintained well      Chronic pain syndrome  Continue home nortriptyline + gabapentin + meds for acute pain now  Follows closely with pain management for chronic back pain and receives injections long term for this        VTE Risk Mitigation (From admission, onward)           Ordered     IP VTE HIGH RISK PATIENT  Once         07/19/24 0048     Place sequential compression device  Until discontinued         07/19/24 0048                    Discharge Planning   JAYLEEN: 7/23/2024     Code Status: Full Code   Is the patient medically ready for discharge?:     Reason for patient still in hospital (select all that apply): Patient trending condition  Discharge Plan A: Rehab                  Maria Del Rosario Medina MD  Department of [facility] Medicine   [facility] - Surgery

## 2024-07-22 NOTE — PROGRESS NOTES
"[facility] - Surgery  Endocrinology  Progress Note    Admit Date: 2024     Reason for Consult: Management of T2DM, Hyperglycemia     Surgical Procedure and Date:  24   ORIF, FRACTURE, RADIUS, DISTAL - LEFT (Left)      Diabetes diagnosis year:     Home Diabetes Medications:    Humalog via OmniPod insulin pump  Mounjaro 10 mg weekly     Current pump settings:  Basal 12 am to 2.3 and 6 am to 1.55  ICR to 3 at 12 am, 2 at 4 pm  ISF to 1:20   AIT 3 hrs  Target 110-120       Patient had anaphylaxis reaction to SGLT2 inhibitors specifically Invokana        Lab Results   Component Value Date    LABA1C 10.8 (H) 08/15/2016    HGBA1C 9.9 (H) 2024       How often checking glucose at home? Dexcom G6   BG readings on regimen: variable 40s-300s (mostly on high side)   Hypoglycemia on the regimen?  Yes  Missed doses on regimen?  No    Diabetes Complications include:     Hyperglycemia and Diabetic peripheral neuropathy       Complicating diabetes co morbidities:   HLD, HTN, Obesity       HPI:   Patient is a 64 y.o. male with a diagnosis of anxiety, HLD, HTN, and T2DM who presents to the ED after a fall. Imaging shows right trimalleolar ankle fracture with skin tenting & non-displaced left distal radius fracture. He now presents for the above procedure(s). Patient sees Pepe Meade DO for insulin pump management. Last seen on . Endocrinology consulted for management of T2DM.       Interval HPI:   Overnight events: POD 3. Remains in 541A. NAEON. BG variable on current home insulin pump (). Hypoglycemia noted yesterday evening (BG 62) requiring 16g oral glucose tabs. Diet diabetic 2000 Calorie    Eatin%  Nausea: No  Hypoglycemia and intervention: No  Fever: No  TPN and/or TF: No  If yes, type of TF/TPN and rate: n/a    BP (!) 150/74 (BP Location: Right arm, Patient Position: Lying)   Pulse 84   Temp 98 °F (36.7 °C) (Oral)   Resp 18   Ht 5' 5" (1.651 m)   Wt 92.1 kg (203 lb)   SpO2 (!) 93%   BMI " "33.78 kg/m²     Labs Reviewed and Include    No results for input(s): "GLU", "CALCIUM", "ALBUMIN", "PROT", "NA", "K", "CO2", "CL", "BUN", "CREATININE", "ALKPHOS", "ALT", "AST", "BILITOT" in the last 24 hours.  Lab Results   Component Value Date    WBC 9.33 07/21/2024    HGB 12.7 (L) 07/21/2024    HCT 40.0 07/21/2024    MCV 91 07/21/2024     07/21/2024     Recent Labs   Lab 07/18/24  1617   TSH 6.243*   FREET4 0.98     Lab Results   Component Value Date    HGBA1C 9.9 (H) 07/18/2024       Nutritional status:   Body mass index is 33.78 kg/m².  Lab Results   Component Value Date    ALBUMIN 2.7 (L) 07/21/2024    ALBUMIN 2.8 (L) 07/20/2024    ALBUMIN 3.0 (L) 07/19/2024     Lab Results   Component Value Date    PREALBUMIN 13 (L) 07/18/2024       Estimated Creatinine Clearance: 65.7 mL/min (based on SCr of 1.2 mg/dL).    Accu-Checks  Recent Labs     07/19/24  0925 07/19/24  1431 07/19/24  1535 07/20/24  0750 07/20/24  1141 07/20/24  1642 07/21/24  1614 07/21/24  2031 07/22/24  0818   POCTGLUCOSE 171* 264* 249* 122* 132* 92 252* 62* 173*       Current Medications and/or Treatments Impacting Glycemic Control  Immunotherapy:    Immunosuppressants       None          Steroids:   Hormones (From admission, onward)      Start     Stop Route Frequency Ordered    07/19/24 0145  melatonin tablet 6 mg         -- Oral Nightly PRN 07/19/24 0048          Pressors:    Autonomic Drugs (From admission, onward)      None          Hyperglycemia/Diabetes Medications:   Antihyperglycemics (From admission, onward)      Start     Stop Route Frequency Ordered    07/19/24 1645  insulin lispro insulin pump from home 50 Units        Question Answer Comment   Target number 120    Basal Rate #1 2.3    Basal rate #1 time 0959-1828    Basal Rate #2 1.55    Basal rate #2 time 2097-7911        -- SubQ Continuous 07/19/24 1534    07/19/24 1632  insulin lispro insulin pump from home 1-20 Units        Question Answer Comment   Target number 120  "   Carbohydrate coverage #1 1:2    Carbohydrate coverage #1 time [phone]    Carbohydrate coverage #2 1:3    Carbohydrate coverage #2 time [phone]    Sensitivity #1 1:20    Sensitivity #1 time [id number]        -- SubQ As needed (PRN) 07/19/24 1534            ASSESSMENT and PLAN    Endocrine  Insulin pump status  Insulin Pump:  Patient has the ability and wherewithal to manage the pump.  Order has been placed to notify nurse of patient using own pump. (Order Set: Adult and Pediatric Home Insulin Pump)     Humalog via OmniPod insulin pump  Current pump settings:  Basal 12 am to 2.3 and 6 am to 1.55  ICR to 3 at 12 am, 2 at 4 pm  ISF to 1:20   AIT 3 hrs  Target 110-120      Pump type:    Change infusion set: Every 1-1.5 days (7/19/24)  Change insertion site: with change of infusion set. (7/19/24)   Location of insertion site: Abdomen  State of insertion site: appears clean    Obesity  Body mass index is 33.78 kg/m².  May increase insulin resistance.         Uncontrolled type 2 diabetes mellitus with hyperglycemia  BG goal 140-180    Hypoglycemia noted yesterday evening. Resolved with glucose tabs and eating. Will monitor and consider pump setting adjustments if continued downward BG trends noted.     Continue home insulin pump (see below for settings)   BG monitoring ac/hs/0200    In the event of pump malfunction- notify endocrine for SQ insulin orders     ** Please call Endocrine for any BG related issues **        Orthopedic  * Closed trimalleolar fracture of right ankle  Managed per primary team  Optimize BG control            Meg Penaloza NP  Endocrinology  [facility] - Surgery

## 2024-07-23 VITALS
BODY MASS INDEX: 33.82 KG/M2 | RESPIRATION RATE: 17 BRPM | DIASTOLIC BLOOD PRESSURE: 67 MMHG | SYSTOLIC BLOOD PRESSURE: 138 MMHG | TEMPERATURE: 98 F | HEART RATE: 94 BPM | HEIGHT: 65 IN | OXYGEN SATURATION: 92 % | WEIGHT: 203 LBS

## 2024-07-23 LAB
ALBUMIN SERPL BCP-MCNC: 2.4 G/DL (ref 3.5–5.2)
ALP SERPL-CCNC: 124 U/L (ref 55–135)
ALT SERPL W/O P-5'-P-CCNC: 26 U/L (ref 10–44)
ANION GAP SERPL CALC-SCNC: 8 MMOL/L (ref 8–16)
AST SERPL-CCNC: 29 U/L (ref 10–40)
BASOPHILS # BLD AUTO: 0.07 K/UL (ref 0–0.2)
BASOPHILS NFR BLD: 0.8 % (ref 0–1.9)
BILIRUB SERPL-MCNC: 0.4 MG/DL (ref 0.1–1)
BUN SERPL-MCNC: 19 MG/DL (ref 8–23)
CALCIUM SERPL-MCNC: 8.7 MG/DL (ref 8.7–10.5)
CHLORIDE SERPL-SCNC: 104 MMOL/L (ref 95–110)
CO2 SERPL-SCNC: 24 MMOL/L (ref 23–29)
CREAT SERPL-MCNC: 1 MG/DL (ref 0.5–1.4)
DIFFERENTIAL METHOD BLD: ABNORMAL
EOSINOPHIL # BLD AUTO: 0.5 K/UL (ref 0–0.5)
EOSINOPHIL NFR BLD: 5.1 % (ref 0–8)
ERYTHROCYTE [DISTWIDTH] IN BLOOD BY AUTOMATED COUNT: 13.5 % (ref 11.5–14.5)
EST. GFR  (NO RACE VARIABLE): >60 ML/MIN/1.73 M^2
GLUCOSE SERPL-MCNC: 117 MG/DL (ref 70–110)
HCT VFR BLD AUTO: 35.9 % (ref 40–54)
HGB BLD-MCNC: 11.8 G/DL (ref 14–18)
IMM GRANULOCYTES # BLD AUTO: 0.04 K/UL (ref 0–0.04)
IMM GRANULOCYTES NFR BLD AUTO: 0.5 % (ref 0–0.5)
LYMPHOCYTES # BLD AUTO: 1.7 K/UL (ref 1–4.8)
LYMPHOCYTES NFR BLD: 18.7 % (ref 18–48)
MAGNESIUM SERPL-MCNC: 2 MG/DL (ref 1.6–2.6)
MCH RBC QN AUTO: 29.1 PG (ref 27–31)
MCHC RBC AUTO-ENTMCNC: 32.9 G/DL (ref 32–36)
MCV RBC AUTO: 88 FL (ref 82–98)
MONOCYTES # BLD AUTO: 0.6 K/UL (ref 0.3–1)
MONOCYTES NFR BLD: 6.4 % (ref 4–15)
NEUTROPHILS # BLD AUTO: 6 K/UL (ref 1.8–7.7)
NEUTROPHILS NFR BLD: 68.5 % (ref 38–73)
NRBC BLD-RTO: 0 /100 WBC
PHOSPHATE SERPL-MCNC: 3.8 MG/DL (ref 2.7–4.5)
PLATELET # BLD AUTO: 298 K/UL (ref 150–450)
PMV BLD AUTO: 10.9 FL (ref 9.2–12.9)
POCT GLUCOSE: 171 MG/DL (ref 70–110)
POTASSIUM SERPL-SCNC: 4.6 MMOL/L (ref 3.5–5.1)
PROT SERPL-MCNC: 5.9 G/DL (ref 6–8.4)
RBC # BLD AUTO: 4.06 M/UL (ref 4.6–6.2)
SODIUM SERPL-SCNC: 136 MMOL/L (ref 136–145)
WBC # BLD AUTO: 8.81 K/UL (ref 3.9–12.7)

## 2024-07-23 PROCEDURE — 85025 COMPLETE CBC W/AUTO DIFF WBC: CPT | Performed by: HOSPITALIST

## 2024-07-23 PROCEDURE — 99232 SBSQ HOSP IP/OBS MODERATE 35: CPT | Mod: ,,, | Performed by: NURSE PRACTITIONER

## 2024-07-23 PROCEDURE — 99222 1ST HOSP IP/OBS MODERATE 55: CPT | Mod: ,,, | Performed by: NURSE PRACTITIONER

## 2024-07-23 PROCEDURE — 25000003 PHARM REV CODE 250

## 2024-07-23 PROCEDURE — 83735 ASSAY OF MAGNESIUM: CPT | Performed by: HOSPITALIST

## 2024-07-23 PROCEDURE — 84100 ASSAY OF PHOSPHORUS: CPT | Performed by: HOSPITALIST

## 2024-07-23 PROCEDURE — 80053 COMPREHEN METABOLIC PANEL: CPT | Performed by: HOSPITALIST

## 2024-07-23 PROCEDURE — 97116 GAIT TRAINING THERAPY: CPT | Mod: CQ

## 2024-07-23 PROCEDURE — 97530 THERAPEUTIC ACTIVITIES: CPT | Mod: CQ

## 2024-07-23 PROCEDURE — 25000003 PHARM REV CODE 250: Performed by: HOSPITALIST

## 2024-07-23 PROCEDURE — 36415 COLL VENOUS BLD VENIPUNCTURE: CPT | Performed by: HOSPITALIST

## 2024-07-23 RX ORDER — INSULIN LISPRO 100 [IU]/ML
INJECTION, SOLUTION INTRAVENOUS; SUBCUTANEOUS
Start: 2024-07-23

## 2024-07-23 RX ORDER — ASPIRIN 81 MG/1
81 TABLET ORAL 2 TIMES DAILY
Start: 2024-07-23 | End: 2025-07-23

## 2024-07-23 RX ORDER — CELECOXIB 200 MG/1
200 CAPSULE ORAL DAILY
Start: 2024-07-23

## 2024-07-23 RX ORDER — METHOCARBAMOL 500 MG/1
500 TABLET, FILM COATED ORAL 4 TIMES DAILY PRN
Start: 2024-07-23

## 2024-07-23 RX ORDER — LANOLIN ALCOHOL/MO/W.PET/CERES
200 CREAM (GRAM) TOPICAL DAILY
Start: 2024-07-23

## 2024-07-23 RX ORDER — ONDANSETRON 4 MG/1
8 TABLET, ORALLY DISINTEGRATING ORAL EVERY 6 HOURS PRN
Start: 2024-07-23

## 2024-07-23 RX ORDER — MORPHINE SULFATE 15 MG/1
15 TABLET ORAL EVERY 4 HOURS PRN
Start: 2024-07-23

## 2024-07-23 RX ORDER — AMOXICILLIN 250 MG
1 CAPSULE ORAL 2 TIMES DAILY
Start: 2024-07-23

## 2024-07-23 RX ORDER — GABAPENTIN 300 MG/1
CAPSULE ORAL
Start: 2024-07-23

## 2024-07-23 RX ORDER — LOSARTAN POTASSIUM 25 MG/1
25 TABLET ORAL DAILY
Status: DISCONTINUED | OUTPATIENT
Start: 2024-07-23 | End: 2024-07-23 | Stop reason: HOSPADM

## 2024-07-23 RX ORDER — TALC
6 POWDER (GRAM) TOPICAL NIGHTLY PRN
Start: 2024-07-23

## 2024-07-23 RX ORDER — LOSARTAN POTASSIUM 25 MG/1
25 TABLET ORAL DAILY
Start: 2024-07-23 | End: 2025-07-23

## 2024-07-23 RX ORDER — TIRZEPATIDE 10 MG/.5ML
10 INJECTION, SOLUTION SUBCUTANEOUS WEEKLY
Start: 2024-07-23

## 2024-07-23 RX ORDER — ACETAMINOPHEN 325 MG/1
650 TABLET ORAL EVERY 6 HOURS
Start: 2024-07-23

## 2024-07-23 RX ORDER — POLYETHYLENE GLYCOL 3350 17 G/17G
17 POWDER, FOR SOLUTION ORAL DAILY
Start: 2024-07-23

## 2024-07-23 RX ADMIN — LOSARTAN POTASSIUM 25 MG: 25 TABLET, FILM COATED ORAL at 08:07

## 2024-07-23 RX ADMIN — Medication 1 CAPSULE: at 08:07

## 2024-07-23 RX ADMIN — POLYETHYLENE GLYCOL 3350 17 G: 17 POWDER, FOR SOLUTION ORAL at 08:07

## 2024-07-23 RX ADMIN — ASPIRIN 81 MG: 81 TABLET, COATED ORAL at 08:07

## 2024-07-23 RX ADMIN — CHOLECALCIFEROL TAB 25 MCG (1000 UNIT) 1000 UNITS: 25 TAB at 08:07

## 2024-07-23 RX ADMIN — GABAPENTIN 600 MG: 300 CAPSULE ORAL at 08:07

## 2024-07-23 RX ADMIN — SENNOSIDES AND DOCUSATE SODIUM 1 TABLET: 50; 8.6 TABLET ORAL at 08:07

## 2024-07-23 RX ADMIN — ACETAMINOPHEN 650 MG: 325 TABLET ORAL at 03:07

## 2024-07-23 RX ADMIN — EMTRICITABINE AND TENOFOVIR DISOPROXIL FUMARATE 1 TABLET: 200; 300 TABLET, FILM COATED ORAL at 08:07

## 2024-07-23 RX ADMIN — DULOXETINE HYDROCHLORIDE 60 MG: 60 CAPSULE, DELAYED RELEASE ORAL at 08:07

## 2024-07-23 RX ADMIN — Medication 200 MG: at 08:07

## 2024-07-23 RX ADMIN — ACETAMINOPHEN 650 MG: 325 TABLET ORAL at 08:07

## 2024-07-23 RX ADMIN — CELECOXIB 200 MG: 200 CAPSULE ORAL at 08:07

## 2024-07-23 RX ADMIN — ATORVASTATIN CALCIUM 40 MG: 40 TABLET, FILM COATED ORAL at 08:07

## 2024-07-23 RX ADMIN — CYANOCOBALAMIN TAB 1000 MCG 1000 MCG: 1000 TAB at 08:07

## 2024-07-23 NOTE — DISCHARGE SUMMARY
DISCHARGE SUMMARY  Hospital Medicine    [place]: [facility]    Patient Name: Cortes Schroeder  YOB: 1960    Admit Date: 7/18/2024    Discharge Date: 07/23/2024    Discharge Attending Physician: Maria Del Rosario Medina MD    Principal Diagnoses:  Active Hospital Problems    Diagnosis  POA    *Closed trimalleolar fracture of right ankle [S82.851A]  Yes    Class 1 obesity due to excess calories with serious comorbidity and body mass index (BMI) of 33.0 to 33.9 in adult [E66.09, Z68.33]  Not Applicable    Hyponatremia [E87.1]  Yes    Abnormal thyroid blood test [R79.89]  Yes    Closed fracture of left distal radius [S52.502A]  Yes    Multiple falls [R29.6]  Not Applicable    Syncope [R55]  Yes    Polyneuropathy due to type 2 diabetes mellitus [E11.42]  Yes    Uncontrolled type 2 diabetes mellitus with hyperglycemia [E11.65]  Yes    Hypertension, essential [I10]  Yes    Obesity [E66.9]  Yes    Insulin pump status [Z96.41]  Not Applicable    Diabetic nephropathy associated with type 2 diabetes mellitus [E11.21]  Yes    Chronic pain syndrome [G89.4]  Yes      Resolved Hospital Problems   No resolved problems to display.       Discharged Condition:  stable    Interval history- he reports feeling well this morning. Pain controlled and doing well. Keeping leg elevated. Ortho recs to make sure stays elevated and ice on it and updaetd rehab orders and SW let rehab know. Ortho reports has the date scheduled for OP surgery Friday for ORIF and then can return to rehab after surgery Friday. Endo reports no adjustments to insulin pump and keep current settings for him. His roommate is bringing some clothes for him to have at rehab later today and brought extra insulin supplies that he will make sure to get from our fridge to take to rehab. Saw him in hallway ambulating with PT and was doing well. Plan to rehab today for further therapies.    Temp:  [97.7 °F (36.5 °C)-98.1 °F (36.7 °C)]   Pulse:  [84-98]   Resp:  [17-18]    BP: (138-173)/(67-87)   SpO2:  [92 %-97 %]        Physical Exam  Vitals and nursing note reviewed.   Constitutional:       General: He is not in acute distress.     Comments: Pleasant.    HENT:      Head: Normocephalic and atraumatic.   Eyes:      General: No scleral icterus.  Cardiovascular:      Rate and Rhythm: Normal rate and regular rhythm.      Comments: No carotid bruits  Pulmonary:      Effort: Pulmonary effort is normal. No respiratory distress.      Breath sounds: No wheezing.      Comments: Limited anterior exam  Abdominal:      General: Bowel sounds are normal.      Palpations: Abdomen is soft.   Musculoskeletal:      Cervical back: Neck supple.      Left lower leg: No edema.      Comments: RLE in ex fix, elevated with pin sites covered with gauze     LUE in splint and sling.  Insulin pump on right upper arm   Neurological:      Mental Status: He is alert.     HOSPITAL COURSE:      Initial Presentation:    64-year-old man with type 2 diabetes on insulin pump, hypertension, morbid obesity, chronic pain syndrome presents to the emergency department after multiple falls with traumatic injury.  He reports while walking into his [employer] building he suffered a series of falls today 3-4 total, prior to the 1st fall he experienced dizziness while walking toward his building and then found himself on the ground, he was able to get up under his own power and then fell again near the elevator, and 2 more times with the last fall occurring as he got into his apartment, he believes he suffered ankle injury on the last fall as he was unable to get back up or bear weight.     He is unsure if he had loss of consciousness he denies any other recent falls.  On ED workup he was found with a right ankle fracture and a distal left radius fracture.  He was taken from the ED to the OR this evening for surgical repair of the ankle fracture, I saw patient in the PACU area after his initial operation, he commented on having some  postoperative pain, reported history of intolerance to oxycodone and hydrocodone as they caused hallucinations and psychotic symptoms, we will avoid use.     He uses insulin pump - omnipod changed every 1.5 days.  He is still wearing insulin pump thru tonights surgery.  PACU RN to help him charge phone which is how he controls insulin pump          Course of Principle Problem for Admission:    Closed trimalleolar fracture of right ankle  Sustained in fall with concern for presyncope episodes with dizziness and blackout type episode. Possible poor intake/dehydration contributor with only banana eaten that day before episode and hyponatremia/DEEPALI on admit with improevments now as well as hypotension as BP 70s in the field   -maintain on telemetry, echo normal with only mild elevation in PAP  -trop negative, UA negative. Tsh mildly up but free T4 normal.  -s/p ex fix on 7/18 PM with dr velez and pin site care BID, elevate and ice. Fixation likely 7/26 pending swellign ipmrovements. Cont ice and elevation at all times per ortho   Multimodal pain medications. NWB to RLE, PT/OT consulted. Recs high intensity therapy, with UE and LE fx and likely  good rehab candidate  and PM and R consulted and accepted to rehab at [facility]   Will also likely need FMLA Paperwork as likely javier have extended NWB time for suspected at least 2 months but will f/u ortho recs post ORIF later this week but typical times for this type of injury at minimum usually. Given info for [facility] disability desk information once work gives him paperwork for this, he has started talking to them about what he needs from them  -bowel regimen  -ASA BID for ppx for 8 weeks post ORIF and end date placed on rehab orders           Syncope  Workup with echo normal, cont tele  Culprits may have been dehydration with deepali/hyponatremia on admit, Hypotension with 70s systolic with EMS he reports (hold losartan). No signs of hypoglycemia as contributor  -other labs  stable on work up        Multiple falls  Etiology unclear  -pt was not hypoglycemic on arrival  -possible had syncope with 1st fall  -echo without issues  Suspect BP + dehydration as cause as insidious causes with work up not found on echo, cardiac monitoring, other lab work up          Closed fracture of left distal radius  Secondary to fall  S/p ORIF on 7/19 with dr velez with ORIF and Synthes distal radius VA volar plate and screws   -recs for PT/OT training with a platform walker for gait with weight-bearing across the left elbow and minimal gripping of the post. Range of motion of the fingers on the left hand.   Multimodal pain management limiting narcotics. Tight blood glucose control.   -asa BID for ppx  -sling and splint in place        Abnormal thyroid blood test  Free T4 nromal with TSH 6. Repeat in 6 weeks to document stability        Hyponatremia  Given IV fluids in ED and on 7/19 post op - with improved and normalized now      Recent Labs   Lab 07/21/24  0511         He reports has been a chronic issue in the past and gone to the ER by PCP referral for this and given saline with improvements  Discussed on 7/21 if strenous activity/working out/sweating once recovered from this injury, would use isotonic fluid to hydrate like sugar free gatorade/pedialyte as can sometimes dilute with straight water alone as recovery fluid. Can also have pseudohypnatremia if glucose is highly elevated     Uncontrolled type 2 diabetes mellitus with hyperglycemia  Patient's FSGs are uncontrolled due to hyperglycemia on current medication regimen.  Last A1c reviewed-         Lab Results   Component Value Date     LABA1C 10.8 (H) 08/15/2016     HGBA1C 9.9 (H) 07/18/2024      Most recent fingerstick glucose reviewed-         Recent Labs   Lab 07/21/24  1614 07/21/24  2031 07/22/24  0818   POCTGLUCOSE 252* 62* 173*         Current correctional scale  Medium  Maintain anti-hyperglycemic dose as follows-    Antihyperglycemics (From admission, onward)        Start     Stop Route Frequency Ordered     07/19/24 1645   insulin lispro insulin pump from home 50 Units        Question Answer Comment   Target number 120     Basal Rate #1 2.3     Basal rate #1 time 8987-5528     Basal Rate #2 1.55     Basal rate #2 time 3812-6762         -- SubQ Continuous 07/19/24 1534     07/19/24 1632   insulin lispro insulin pump from home 1-20 Units        Question Answer Comment   Target number 120     Carbohydrate coverage #1 1:2     Carbohydrate coverage #1 time [phone]     Carbohydrate coverage #2 1:3     Carbohydrate coverage #2 time 2630-7311     Sensitivity #1 1:20     Sensitivity #1 time [id number]         -- SubQ As needed (PRN) 07/19/24 1534             Hold Oral hypoglycemics while patient is in the hospital.  -has been workign with outpatient endocrine MD long term. Has been diabetic since 1995.  -DM diet, insulin pump in plaec with endo managing  -hold mounjaro until all surgeries completed  Lows overnight 7/22 to 40s-60s and requried sandwich, glucose tabs to improve. He reports happens at home every now and then, oral intake was good last night, and endo adjsuted and cont current settings of pump on dc to rehab today        Hypertension, essential  Chronic, controlled. Latest blood pressure and vitals reviewed-      Temp:  [97.5 °F (36.4 °C)-98.3 °F (36.8 °C)]   Pulse:  []   Resp:  [17-20]   BP: (135-186)/(69-88)   SpO2:  [93 %-98 %] .   Home meds for hypertension were reviewed and noted below.   Hypertension Medications                    losartan (COZAAR) 50 MG tablet TAKE 1 TABLET(50 MG) BY MOUTH EVERY DAY                -reported to be 70s systolic with EMS and holding losartan further as possible contributor to presyncopal episode. Normotensive now on 7/20 but ticking up 7/21, possible technique as difficulty getting BP as left arm post op and insulin pump in right arm so using right wrist, right forearm. Prn  hdyralazine added for now as overly cautious about overcorrecting before resuming home losartan given above  Received hydralazine once 7/21 around 1 pm with improved BPs more 130s-150s now, so trend, and if spikes again can consider losartan, cuatious as above  -will start losartan 25 7/23 and cont at rehab as has been consistently higher than normal now     Obesity  Body mass index is 33.78 kg/m². Morbid obesity complicates all aspects of disease management from diagnostic modalities to treatment.   -pt on mounjaro as outpatient. And has been losing weight with this. Hold mounjaro until all surgerise completed as risk of aspiration of stomach contents with anesthesia so can resume once out of hospital and discussed with him                  Insulin pump status     -endocrinology consulted and appreciate assistance. Roommate brought home pump and supplies and hooked up 7/19 PM. He plans to bring extra back up supplies further on 7/21..  Patient states humalog brand short acting insulin does not work for him or produce proper effect.  At this time he would prefer to maintain his insulin pump.   -some hypoglycemia overnight 7/22 and adjusting, oral intake has been maintained well and glucose maintained now at goal. Continue current settings on insulin pump at rehab per discussion with endo today before dc. Continue dexcom on dc        Chronic pain syndrome  Continue home nortriptyline + gabapentin + meds for acute pain now  Follows closely with pain management for chronic back pain and receives injections long term for this       Consults: orthoalia    Last CBC/BMP:    CBC/Anemia Labs: Coags:    Recent Labs   Lab 07/20/24  0515 07/21/24  0511 07/23/24  0604   WBC 9.25 9.33 8.81   HGB 12.5* 12.7* 11.8*   HCT 38.6* 40.0 35.9*    221 298   MCV 89 91 88   RDW 13.4 13.7 13.5    Recent Labs   Lab 07/18/24  1802   INR 1.0        Chemistries:   Recent Labs   Lab 07/20/24  0515 07/21/24  0511 07/23/24  0604     138 136   K 4.2 4.6 4.6    106 104   CO2 23 25 24   BUN 21 17 19   CREATININE 1.3 1.2 1.0   CALCIUM 8.5* 8.5* 8.7   PROT 6.0 6.0 5.9*   BILITOT 0.4 0.4 0.4   ALKPHOS 112 97 124   ALT 15 9* 26   AST 15 17 29   MG 2.2 2.1 2.0   PHOS 3.6 3.6 3.8              Special Treatments/Procedures:   Procedure(s) (LRB):  ORIF, FRACTURE, RADIUS, DISTAL - LEFT (Left)     Disposition: Rehab Facility      Future Scheduled Appointments:  Future Appointments   Date Time Provider Department Center   7/25/2024 11:00 AM Alisa Prater PA-C [facility] [address]   8/2/2024  2:30 PM Laz Chung NP [facility] [address]   8/30/2024  8:45 AM Alisa Prater PA-C [facility] [address]           Discharge Medication List:         Medication List        START taking these medications      acetaminophen 325 MG tablet  Commonly known as: TYLENOL  Take 2 tablets (650 mg total) by mouth every 6 (six) hours.     celecoxib 200 MG capsule  Commonly known as: CeleBREX  Take 1 capsule (200 mg total) by mouth once daily.     gabapentin 300 MG capsule  Commonly known as: NEURONTIN  Take 2 capsules (600 mg total) by mouth once daily AND 4 capsules (1,200 mg total) every evening.  Replaces: gabapentin 600 MG tablet     magnesium oxide 400 mg (241.3 mg magnesium) tablet  Commonly known as: MAG-OX  Take 0.5 tablets (200 mg total) by mouth once daily.  Replaces: magnesium oxide-Mg AA chelate 133 mg Tab     melatonin 3 mg tablet  Commonly known as: MELATIN  Take 2 tablets (6 mg total) by mouth nightly as needed for Insomnia.     morphine 15 MG tablet  Commonly known as: MSIR  Take 1 tablet (15 mg total) by mouth every 4 (four) hours as needed (pain scale 6-10).     ondansetron 4 MG Tbdl  Commonly known as: ZOFRAN-ODT  Take 2 tablets (8 mg total) by mouth every 6 (six) hours as needed (nausea).     polyethylene glycol 17 gram Pwpk  Commonly known as: GLYCOLAX  Take 17 g by mouth once daily.     senna-docusate 8.6-50 mg 8.6-50 mg per  tablet  Commonly known as: PERICOLACE  Take 1 tablet by mouth 2 (two) times daily.            CHANGE how you take these medications      aspirin 81 MG EC tablet  Commonly known as: ECOTRIN  Take 1 tablet (81 mg total) by mouth 2 (two) times a day. End date sept 20, 2024  What changed:   when to take this  additional instructions     HumaLOG U-100 Insulin 100 unit/mL injection  Generic drug: insulin lispro  1:20 U PRN high blood sugar and snacks. Correction dose- Enter carb coverage intake upon administration  Target number 120 Carbohydrate coverage #1 1:2 Carbohydrate coverage #1 time 6103-4728 Carbohydrate coverage #2 1:3 Carbohydrate coverage #2 time 3572-2646 Carbohydrate coverage #3 Carbohydrate coverage #3 time Carbohydrate coverage #4 Carbohydrate coverage #4 time Sensitivity #1 1:20 Sensitivity #1 time 8917-7170 Sensitivity #2 Sensitivity #2 time Sensitivity #3 Sensitivity #3 time Sensitivity #4 Sensitivity #4 time Sensitivity #5 Sensitivity #5 time Order Questions Question Answer Comment       50 U SQ continuous  Target number 120 Basal Rate #1 2.3 Basal rate #1 time 8407-2812 Basal Rate #2 1.55 Basal rate #2 time 2373-1978 Basal rate #3 Basal rate #3 time Basal rate #4 Basal rate #4 time Basal rate #5 Basal rate #5 time  What changed: additional instructions     losartan 25 MG tablet  Commonly known as: COZAAR  Take 1 tablet (25 mg total) by mouth once daily.  What changed:   medication strength  how much to take  when to take this     methocarbamoL 500 MG Tab  Commonly known as: ROBAXIN  Take 1 tablet (500 mg total) by mouth 4 (four) times daily as needed (pain scale 4-7).  What changed:   how much to take  when to take this  reasons to take this     MOUNJARO 10 mg/0.5 mL Pnij  Generic drug: tirzepatide  Inject 10 mg into the skin once a week. HOLD until all surgeries completed and out of rehab  What changed: additional instructions            CONTINUE taking these medications      cyanocobalamin 1000 MCG  tablet  Commonly known as: VITAMIN B-12     DEXCOM G6 SENSOR Omaira  Generic drug: blood-glucose sensor  Change sensor every 10 days     DEXCOM G6 TRANSMITTER Omaira  Generic drug: blood-glucose transmitter  Change transmitter every 3 months     DULoxetine 60 MG capsule  Commonly known as: CYMBALTA  Take 1 capsule (60 mg total) by mouth once daily.     emtricitabine-tenofovir 200-300 mg 200-300 mg Tab  Commonly known as: TRUVADA  Take 1 tablet by mouth once daily.     fish oil-omega-3 fatty acids 300-1,000 mg capsule     nortriptyline 50 MG capsule  Commonly known as: PAMELOR  Take 1 capsule (50 mg total) by mouth nightly.     rosuvastatin 10 MG tablet  Commonly known as: CRESTOR     vitamin D 1000 units Tab  Commonly known as: VITAMIN D3            STOP taking these medications      CINNAMON 500 mg capsule  Generic drug: cinnamon bark     gabapentin 600 MG tablet  Commonly known as: NEURONTIN  Replaced by: gabapentin 300 MG capsule     L-CITRULLINE MISC     lancets Misc     magnesium oxide-Mg AA chelate 133 mg Tab  Commonly known as: MG-PLUS-PROTEIN  Replaced by: magnesium oxide 400 mg (241.3 mg magnesium) tablet     OMNIPOD DASH PDM KIT MISC     OMNIPOD DASH PODS (GEN 4) Crtg  Generic drug: insulin pump cart,cont inf,BT     tiZANidine 4 MG tablet  Commonly known as: ZANAFLEX               Where to Get Your Medications        Information about where to get these medications is not yet available    Ask your nurse or doctor about these medications  acetaminophen 325 MG tablet  aspirin 81 MG EC tablet  celecoxib 200 MG capsule  gabapentin 300 MG capsule  HumaLOG U-100 Insulin 100 unit/mL injection  losartan 25 MG tablet  magnesium oxide 400 mg (241.3 mg magnesium) tablet  melatonin 3 mg tablet  methocarbamoL 500 MG Tab  morphine 15 MG tablet  MOUNJARO 10 mg/0.5 mL Pnij  ondansetron 4 MG Tbdl  polyethylene glycol 17 gram Pwpk  senna-docusate 8.6-50 mg 8.6-50 mg per tablet         Patient Instructions:  No discharge  procedures on file.    At the time of discharge patient was told to take all medications as prescribed, to keep all followup appointments, and to call their primary care physician or return to the emergency room if they have any worsening or concerning symptoms    I spent 35 minutes preparing the discharge  .    Signing Physician:  Maria Del Rosario Medina MD

## 2024-07-23 NOTE — PLAN OF CARE
[facility]    FACILITY TRANSFER ORDERS      Patient Name: Cortes Schroeder  YOB: 1960    PCP: Andrew Sinclair Jr., MD   PCP Address: [address]  PCP Phone Number: [phone]  PCP Fax: [phone]    Encounter Date: 07/23/2024    Admit to: ip rehab    Vital Signs:  Routine    Diagnoses:   Active Hospital Problems    Diagnosis  POA    *Closed trimalleolar fracture of right ankle [S82.851A]  Yes    Class 1 obesity due to excess calories with serious comorbidity and body mass index (BMI) of 33.0 to 33.9 in adult [E66.09, Z68.33]  Not Applicable    Hyponatremia [E87.1]  Yes    Abnormal thyroid blood test [R79.89]  Yes    Closed fracture of left distal radius [S52.502A]  Yes    Multiple falls [R29.6]  Not Applicable    Syncope [R55]  Yes    Polyneuropathy due to type 2 diabetes mellitus [E11.42]  Yes    Uncontrolled type 2 diabetes mellitus with hyperglycemia [E11.65]  Yes    Hypertension, essential [I10]  Yes    Obesity [E66.9]  Yes    Insulin pump status [Z96.41]  Not Applicable    Diabetic nephropathy associated with type 2 diabetes mellitus [E11.21]  Yes    Chronic pain syndrome [G89.4]  Yes      Resolved Hospital Problems   No resolved problems to display.       Allergies:  Review of patient's allergies indicates:   Allergen Reactions    Invokana [canagliflozin] Anaphylaxis    Percocet [oxycodone-acetaminophen] Nausea Only and Hallucinations    Biaxin [clarithromycin]     Hydrocodone Other (See Comments)     Dizzy/nausea/hallucinations    Sulfa (sulfonamide antibiotics) Nausea Only and Rash       Diet: diabetic diet: 2000 calorie    Activities: NWB to RLE  -LUE recs for PT/OT training with a platform walker for gait with weight-bearing across the left elbow and minimal gripping of the post. Range of motion of the fingers on the left hand.         Goals of Care Treatment Preferences:  Code Status: Full Code    Living Will: Yes              Nursing: [facility]  care BID- mix 1/2 hydrogen peroxide and 1/2 NS to pin sites and cover with kerlix to RLE    RLE - keep large bag of ice and elevation at all  Times when in bed for his right ankle         CONSULTS:    Physical Therapy to evaluate and treat.  and Occupational Therapy to evaluate and treat.    MISCELLANEOUS CARE:  Routine Skin for Bedridden Patients: Apply moisture barrier cream to all skin folds and wet areas in perineal area daily and after baths and all bowel movements. and Diabetes Care:   Fingerstick blood sugar AC and HS    WOUND CARE ORDERS  See above for pin site care  Keep splint to LUE until ortho follow up      Medications: Review discharge medications with patient and family and provide education.      Current Discharge Medication List        START taking these medications    Details   acetaminophen (TYLENOL) 325 MG tablet Take 2 tablets (650 mg total) by mouth every 6 (six) hours.      celecoxib (CELEBREX) 200 MG capsule Take 1 capsule (200 mg total) by mouth once daily.      gabapentin (NEURONTIN) 300 MG capsule Take 2 capsules (600 mg total) by mouth once daily AND 4 capsules (1,200 mg total) every evening.      magnesium oxide (MAG-OX) 400 mg (241.3 mg magnesium) tablet Take 0.5 tablets (200 mg total) by mouth once daily.      melatonin (MELATIN) 3 mg tablet Take 2 tablets (6 mg total) by mouth nightly as needed for Insomnia.      morphine (MSIR) 15 MG tablet Take 1 tablet (15 mg total) by mouth every 4 (four) hours as needed (pain scale 6-10).    Comments: n/a       ondansetron (ZOFRAN-ODT) 4 MG TbDL Take 2 tablets (8 mg total) by mouth every 6 (six) hours as needed (nausea).      polyethylene glycol (GLYCOLAX) 17 gram PwPk Take 17 g by mouth once daily.      senna-docusate 8.6-50 mg (PERICOLACE) 8.6-50 mg per tablet Take 1 tablet by mouth 2 (two) times daily.           CONTINUE these medications which have CHANGED    Details   aspirin (ECOTRIN) 81 MG EC tablet Take 1 tablet (81 mg total) by mouth 2  (two) times a day. End date sept 20, 2024      HUMALOG U-100 INSULIN 100 unit/mL injection 1:20 U PRN high blood sugar and snacks. Correction dose- Enter carb coverage intake upon administration  Target number 120 Carbohydrate coverage #1 1:2 Carbohydrate coverage #1 time 2954-7848 Carbohydrate coverage #2 1:3 Carbohydrate coverage #2 time [id number] Carbohydrate coverage #3 Carbohydrate coverage #3 time Carbohydrate coverage #4 Carbohydrate coverage #4 time Sensitivity #1 1:20 Sensitivity #1 time 8678-2114 Sensitivity #2 Sensitivity #2 time Sensitivity #3 Sensitivity #3 time Sensitivity #4 Sensitivity #4 time Sensitivity #5 Sensitivity #5 time Order Questions Question Answer Comment       50 U SQ continuous  Target number 120 Basal Rate #1 2.3 Basal rate #1 time 7570-8091 Basal Rate #2 1.55 Basal rate #2 time 1346-9298 Basal rate #3 Basal rate #3 time Basal rate #4 Basal rate #4 time Basal rate #5 Basal rate #5 time      losartan (COZAAR) 25 MG tablet Take 1 tablet (25 mg total) by mouth once daily.    Comments: .      methocarbamoL (ROBAXIN) 500 MG Tab Take 1 tablet (500 mg total) by mouth 4 (four) times daily as needed (pain scale 4-7).      MOUNJARO 10 mg/0.5 mL PnIj Inject 10 mg into the skin once a week. HOLD until all surgeries completed and out of rehab           CONTINUE these medications which have NOT CHANGED    Details   cyanocobalamin (VITAMIN B-12) 1000 MCG tablet Take 1,000 mcg by mouth once daily.      DULoxetine (CYMBALTA) 60 MG capsule Take 1 capsule (60 mg total) by mouth once daily.  Qty: 90 capsule, Refills: 0    Associated Diagnoses: Depression, unspecified depression type; Chronic pain syndrome      emtricitabine-tenofovir 200-300 mg (TRUVADA) 200-300 mg Tab Take 1 tablet by mouth once daily.  Qty: 30 tablet, Refills: 0    Associated Diagnoses: Exposure to HIV      fish oil-omega-3 fatty acids 300-1,000 mg capsule Take 1 capsule by mouth 2 (two) times daily.      nortriptyline (PAMELOR) 50 MG  capsule Take 1 capsule (50 mg total) by mouth nightly.  Qty: 90 capsule, Refills: 0      rosuvastatin (CRESTOR) 10 MG tablet Take 10 mg by mouth every evening.      vitamin D 1000 units Tab Take 1,000 Units by mouth 2 (two) times daily.      blood-glucose sensor (DEXCOM G6 SENSOR) [place] Change sensor every 10 days  Qty: 3 each, Refills: PRN    Associated Diagnoses: Type 2 diabetes mellitus, uncontrolled, with retinopathy; Uncontrolled type 2 diabetes mellitus with diabetic polyneuropathy, with long-term current use of insulin; Insulin pump in place      blood-glucose transmitter (DEXCOM G6 TRANSMITTER) [place] Change transmitter every 3 months  Qty: 1 each, Refills: PRN    Associated Diagnoses: Type 2 diabetes mellitus, uncontrolled, with retinopathy; Uncontrolled type 2 diabetes mellitus with diabetic polyneuropathy, with long-term current use of insulin; Insulin pump in place           STOP taking these medications       cinnamon bark (CINNAMON) 500 mg capsule Comments:   Reason for Stopping:         gabapentin (NEURONTIN) 600 MG tablet Comments:   Reason for Stopping:         insulin pump cartridge (OMNIPOD DASH 5 PACK POD) Crtg Comments:   Reason for Stopping:         insulin pump controller (OMNIPOD DASH PDM KIT MISC) Comments:   Reason for Stopping:         L-CITRULLINE MISC Comments:   Reason for Stopping:         magnesium oxide-Mg AA chelate (MG-PLUS-PROTEIN) 133 mg Tab Comments:   Reason for Stopping:         tiZANidine (ZANAFLEX) 4 MG tablet Comments:   Reason for Stopping:         lancets Misc Comments:   Reason for Stopping:                  Immunizations Administered as of 7/23/2024       Name Date Dose VIS Date Route Exp Date    COVID-19, MRNA, LN-S, PF (Moderna) 4/5/2021 0.5 mL 12/1/2020 Intramuscular --    Site: Right arm     : [employer]     Lot: [id number]     Comment: Adminis     COVID-19, MRNA, LN-S, PF (Moderna) 3/8/2021 0.5 mL 12/1/2020 Intramuscular --    Site: Right arm      : [employer]     Lot: [id number]     Comment: Adminis               _________________________________  Maria Del Rosario Medina MD  07/23/2024

## 2024-07-23 NOTE — SUBJECTIVE & OBJECTIVE
Principal Problem:Closed trimalleolar fracture of right ankle    Principal Orthopedic Problem: Right ankle trimal & Left distal radius fracture s/p Right ankle ex-fix 07/18, L DR ORIF 07/19    Interval History: Pt seen and examined at bedside. ABIGAIL, CHILO, AF. Pain controlled. Denies fevers, chills, chest pain, SOB, N/V/D.  Reports patient has been coming on a sling to continue elbow and shoulder range of motion as appropriate.  Discussed the importance of blood glucose management in the acute fracture setting with the patient today.  Possible discharge to rehab today.        Review of patient's allergies indicates:   Allergen Reactions    Invokana [canagliflozin] Anaphylaxis    Percocet [oxycodone-acetaminophen] Nausea Only and Hallucinations    Biaxin [clarithromycin]     Hydrocodone Other (See Comments)     Dizzy/nausea/hallucinations    Sulfa (sulfonamide antibiotics) Nausea Only and Rash       Current Facility-Administered Medications   Medication    acetaminophen tablet 650 mg    acetaminophen tablet 650 mg    aluminum-magnesium hydroxide-simethicone 200-200-20 mg/5 mL suspension 30 mL    aspirin EC tablet 81 mg    atorvastatin tablet 40 mg    bisacodyL suppository 10 mg    celecoxib capsule 200 mg    cyanocobalamin tablet 1,000 mcg    dextrose 10% bolus 125 mL 125 mL    dextrose 10% bolus 125 mL 125 mL    dextrose 10% bolus 250 mL 250 mL    dextrose 10% bolus 250 mL 250 mL    DULoxetine DR capsule 60 mg    emtricitabine-tenofovir 200-300 mg per tablet 1 tablet    gabapentin capsule 600 mg    And    gabapentin capsule 1,200 mg    glucagon (human recombinant) injection 1 mg    glucose chewable tablet 16 g    glucose chewable tablet 24 g    hydrALAZINE tablet 25 mg    insulin lispro insulin pump from home 1-20 Units    insulin lispro insulin pump from home 50 Units    losartan tablet 25 mg    magnesium oxide split tablet 200 mg    melatonin tablet 6 mg    methocarbamoL tablet 500 mg    morphine tablet 15 mg     "naloxone 0.4 mg/mL injection 0.02 mg    nortriptyline capsule 50 mg    omega 3-dha-epa-fish oil capsule 1 capsule    ondansetron injection 4 mg    polyethylene glycol packet 17 g    prochlorperazine injection Soln 5 mg    senna-docusate 8.6-50 mg per tablet 1 tablet    sodium chloride 0.9% flush 10 mL    vitamin D 1000 units tablet 1,000 Units     Objective:     Vital Signs (Most Recent):  Temp: 98.1 °F (36.7 °C) (07/23/24 0718)  Pulse: 86 (07/23/24 0720)  Resp: 18 (07/23/24 0718)  BP: (!) 154/80 (07/23/24 0720)  SpO2: (!) 93 % (07/23/24 0720) Vital Signs (24h Range):  Temp:  [97.7 °F (36.5 °C)-98.1 °F (36.7 °C)] 98.1 °F (36.7 °C)  Pulse:  [83-98] 86  Resp:  [17-18] 18  SpO2:  [93 %-97 %] 93 %  BP: (146-173)/(71-87) 154/80     Weight: 92.1 kg (203 lb)  Height: 5' 5" (165.1 cm)  Body mass index is 33.78 kg/m².      Intake/Output Summary (Last 24 hours) at 7/23/2024 0912  Last data filed at 7/23/2024 0901  Gross per 24 hour   Intake --   Output 2875 ml   Net -2875 ml        Ortho/SPM Exam     AAOx4  NAD  Reg rate  No increased WOB    RLE    Ex-fix in place. Pin sites covered in gauze.  Moderate swelling present  Compartments soft/compressible  SILT throughout  Fires TA/EHL/Gastroc/FHL   DP pulse 2+.       LUE    Arm placed in a sling  Compartments soft/compressible  Reduced sensation to light touch.  Able to make ok sign, thumb up or cross fingers.   Brisk cap refill, WWP      Significant Labs: All pertinent labs within the past 24 hours have been reviewed.    Significant Imaging: I have reviewed and interpreted all pertinent imaging results/findings.  "

## 2024-07-23 NOTE — PROGRESS NOTES
[facility]  Orthopedics  Progress Note    Patient Name: Cortes Schroeder  MRN: [id number]  Admission Date: 7/18/2024  Hospital Length of Stay: 3 days  Attending Provider: Maria Del Rosario Medina MD  Primary Care Provider: Andrew Sinclair Jr., MD  Follow-up For: Procedure(s) (LRB):  ORIF, FRACTURE, RADIUS, DISTAL - LEFT (Left)    Post-Operative Day: 4 Days Post-Op  Subjective:     Principal Problem:Closed trimalleolar fracture of right ankle    Principal Orthopedic Problem: Right ankle trimal & Left distal radius fracture s/p Right ankle ex-fix 07/18, L DR ORIF 07/19    Interval History: Pt seen and examined at bedside. NAEON, VSS, AF. Pain controlled. Denies fevers, chills, chest pain, SOB, N/V/D.  Reports patient has been coming on a sling to continue elbow and shoulder range of motion as appropriate.  Discussed the importance of blood glucose management in the acute fracture setting with the patient today.  Possible discharge to rehab today.        Review of patient's allergies indicates:   Allergen Reactions    Invokana [canagliflozin] Anaphylaxis    Percocet [oxycodone-acetaminophen] Nausea Only and Hallucinations    Biaxin [clarithromycin]     Hydrocodone Other (See Comments)     Dizzy/nausea/hallucinations    Sulfa (sulfonamide antibiotics) Nausea Only and Rash       Current Facility-Administered Medications   Medication    acetaminophen tablet 650 mg    acetaminophen tablet 650 mg    aluminum-magnesium hydroxide-simethicone 200-200-20 mg/5 mL suspension 30 mL    aspirin EC tablet 81 mg    atorvastatin tablet 40 mg    bisacodyL suppository 10 mg    celecoxib capsule 200 mg    cyanocobalamin tablet 1,000 mcg    dextrose 10% bolus 125 mL 125 mL    dextrose 10% bolus 125 mL 125 mL    dextrose 10% bolus 250 mL 250 mL    dextrose 10% bolus 250 mL 250 mL    DULoxetine DR capsule 60 mg    emtricitabine-tenofovir 200-300 mg per tablet 1 tablet    gabapentin capsule 600 mg    And    gabapentin capsule 1,200 mg  "   glucagon (human recombinant) injection 1 mg    glucose chewable tablet 16 g    glucose chewable tablet 24 g    hydrALAZINE tablet 25 mg    insulin lispro insulin pump from home 1-20 Units    insulin lispro insulin pump from home 50 Units    losartan tablet 25 mg    magnesium oxide split tablet 200 mg    melatonin tablet 6 mg    methocarbamoL tablet 500 mg    morphine tablet 15 mg    naloxone 0.4 mg/mL injection 0.02 mg    nortriptyline capsule 50 mg    omega 3-dha-epa-fish oil capsule 1 capsule    ondansetron injection 4 mg    polyethylene glycol packet 17 g    prochlorperazine injection Soln 5 mg    senna-docusate 8.6-50 mg per tablet 1 tablet    sodium chloride 0.9% flush 10 mL    vitamin D 1000 units tablet 1,000 Units     Objective:     Vital Signs (Most Recent):  Temp: 98.1 °F (36.7 °C) (07/23/24 0718)  Pulse: 86 (07/23/24 0720)  Resp: 18 (07/23/24 0718)  BP: (!) 154/80 (07/23/24 0720)  SpO2: (!) 93 % (07/23/24 0720) Vital Signs (24h Range):  Temp:  [97.7 °F (36.5 °C)-98.1 °F (36.7 °C)] 98.1 °F (36.7 °C)  Pulse:  [83-98] 86  Resp:  [17-18] 18  SpO2:  [93 %-97 %] 93 %  BP: (146-173)/(71-87) 154/80     Weight: 92.1 kg (203 lb)  Height: 5' 5" (165.1 cm)  Body mass index is 33.78 kg/m².      Intake/Output Summary (Last 24 hours) at 7/23/2024 0912  Last data filed at 7/23/2024 0901  Gross per 24 hour   Intake --   Output 2875 ml   Net -2875 ml        Ortho/SPM Exam     AAOx4  NAD  Reg rate  No increased WOB    RLE    Ex-fix in place. Pin sites covered in gauze.  Moderate swelling present  Compartments soft/compressible  SILT throughout  Fires TA/EHL/Gastroc/FHL   DP pulse 2+.       LUE    Arm placed in a sling  Compartments soft/compressible  Reduced sensation to light touch.  Able to make ok sign, thumb up or cross fingers.   Brisk cap refill, WWP      Significant Labs: All pertinent labs within the past 24 hours have been reviewed.    Significant Imaging: I have reviewed and interpreted all pertinent imaging " results/findings.  Assessment/Plan:     * Closed trimalleolar fracture of right ankle  Cortes Schroeder is a 64 y.o. male presenting with history of DM2 presents with right trimalleolar ankle fracture with skin tenting & non-displaced left distal radius fracture. No signs of ecchymosis or petechial over the ankle. He is neuro intact. He was closed reduced and splinted in the ED. Ankle was grossly unstable in a splint. He is admitted to  for evaluation of syncopal episodes. He is s/p right ankle ex-fix on 07/18, L distal radius 07/19. Plan for definitive fixation when soft tissue is amenable to surgery, tentatively planned for Friday 07/26/2024.    - DVT Prophylaxis: SCDs at all times while in bed. ASA.  - Pain control: multimodal pain management  - PT/OT: Eval & treat NWB RLE           Closed fracture of left distal radius  He is s/p L DR ORIF on 07/19.     WBAT through the elbow, nonweightbearing left wrist and hand            Andreas Castaneda MD  Orthopedics  [facility]

## 2024-07-23 NOTE — PT/OT/SLP PROGRESS
Physical Therapy Treatment    Patient Name:  Cortes Schroeder   MRN:  [id number]    Recommendations:     Discharge Recommendations: High Intensity Therapy  Discharge Equipment Recommendations: walker, rolling, wheelchair, platform (L PF RW and R LE Leg Rest)  Barriers to discharge: Inaccessible home and Decreased caregiver support    Assessment:     Cortes Schroeder is a 64 y.o. male admitted with a medical diagnosis of <principal problem not specified>.  He presents with the following impairments/functional limitations: weakness, impaired endurance, impaired self care skills, impaired functional mobility, gait instability, impaired balance, decreased upper extremity function, decreased lower extremity function, decreased ROM, pain, edema, impaired cardiopulmonary response to activity, orthopedic precautions . Patient showed excellent initiative and participation. Patient was able to ambulate on hallway, while maintaining proper Orthopedic restriction.    Rehab Prognosis: Good; patient would benefit from acute skilled PT services to address these deficits and reach maximum level of function.    Recent Surgery: Procedure(s) (LRB):  ORIF, ANKLE WITH EX FIX REMOVAL - RIGHT, SYNTHES, C ARM DOOR SIDE (Right)      Plan:     During this hospitalization, patient to be seen 4 x/week to address the identified rehab impairments via gait training, therapeutic activities, therapeutic exercises, neuromuscular re-education and progress toward the following goals:    Plan of Care Expires:       Subjective     Chief Complaint: weakness from being in bed  Patient/Family Comments/goals: to go to Rehab and to recover strength.  Pain/Comfort:  Pain Rating 1: 5/10  Location - Side 1: Right  Location - Orientation 1: distal  Location 1: leg  Pain Addressed 1: Pre-medicate for activity, Reposition, Distraction  Pain Rating Post-Intervention 1: 5/10      Objective:     Communicated with NSG prior to session.  Patient found HOB elevated with  FCD, telemetry upon PT entry to room.     General Precautions: Standard, fall  Orthopedic Precautions: LUE non weight bearing, RLE non weight bearing  Braces:  (R LE External Fixator)  Respiratory Status: Room air     Functional Mobility:  Bed Mobility:     Scooting: minimum assistance  Supine to Sit: minimum assistance  Transfers:     Sit to Stand:  minimum assistance with rolling walker  Bed to Chair: minimum assistance with  rolling walker and platform walker  using  Stand Pivot  Gait: 4 steps to BSC,then 10 ft and 14 ft x 2 trials with L PF RW, with L UE NWB and R LE NWB, with chair follow, step to gait pattern and rest breaks sitting between trials.      AM-PAC 6 CLICK MOBILITY  Turning over in bed (including adjusting bedclothes, sheets and blankets)?: 3  Sitting down on and standing up from a chair with arms (e.g., wheelchair, bedside commode, etc.): 3  Moving from lying on back to sitting on the side of the bed?: 3  Moving to and from a bed to a chair (including a wheelchair)?: 3  Need to walk in hospital room?: 3  Climbing 3-5 steps with a railing?: 1  Basic Mobility Total Score: 16       Treatment & Education:  Joaquínd/Kacey del rosario FCD. Patient sat at EOB for a few minutes to prepare for treatment. Educated on posture and proper RW management.    Patient left up in chair with all lines intact, call button in reach, and NSG notified..    GOALS:     Time Tracking:     PT Received On: 07/23/24  PT Start Time: 1008     PT Stop Time: 1038  PT Total Time (min): 30 min     Billable Minutes: Gait Training 15 and Therapeutic Activity 15    Treatment Type: Treatment  PT/PTA: PTA     Number of PTA visits since last PT visit: 1 07/23/2024

## 2024-07-23 NOTE — PLAN OF CARE
07/23/24 1012   Post-Acute Status   Post-Acute Authorization Placement   Post-Acute Placement Status Set-up Complete/Auth obtained   Discharge Plan   Discharge Plan A Rehab     Patient's set-up has been completed.ELIAS scheduled d/c transportation to [facility] through [facility]. Patient is scheduled to be picked up at 11:30 AM. LEIAS provided patient's nurse with report number #[phone]; ask for the nurse for the patient. Requested  time does not guarantee arrival time.        Aida Rosa LMSW  Case Management   [facility]   Ext. [phone]

## 2024-07-23 NOTE — ASSESSMENT & PLAN NOTE
-endocrinology consulted and appreciate assistance. Roommate brought home pump and supplies and hooked up 7/19 PM. He plans to bring extra back up supplies further on 7/21..  Patient states humalog brand short acting insulin does not work for him or produce proper effect.  At this time he would prefer to maintain his insulin pump.     
  -endocrinology consulted and appreciate assistance. Roommate brought home pump and supplies and hooked up 7/19 PM. He plans to bring extra back up supplies further on 7/21..  Patient states humalog brand short acting insulin does not work for him or produce proper effect.  At this time he would prefer to maintain his insulin pump.   -some hypoglycemia overnight 7/22 and adjusting, oral intake has been maintained well    
"Patient's FSGs are uncontrolled due to hyperglycemia on current medication regimen.  Last A1c reviewed-   Lab Results   Component Value Date    LABA1C 10.8 (H) 08/15/2016    HGBA1C 9.9 (H) 07/18/2024     Most recent fingerstick glucose reviewed- No results for input(s): "POCTGLUCOSE" in the last 24 hours.  Current correctional scale  Medium  Maintain anti-hyperglycemic dose as follows-   Antihyperglycemics (From admission, onward)      Start     Stop Route Frequency Ordered    07/19/24 0153  insulin aspart U-100 pen 0-10 Units         -- SubQ Before meals & nightly PRN 07/19/24 0053          Hold Oral hypoglycemics while patient is in the hospital.  "
-  Encourage mobility, OOB in chair at least 3 hours per day, and early ambulation as appropriate  -  PT/OT evaluate and treat  -  Reviewed discharge options (IP rehab)   
- Per HM syncope appears is a combination of low Na/DEEPALI volme depletion and hypotension.   - Cardiac work up thus far negative.   
- S/p Right ankle ex-fix 07/18   - Ortho recommending NWB RLE  - swelling improved per Ortho  
- S/p left distal radius ORIF 7/19/24.   - Ortho recommending LUE weightbearing at the elbow, full ROM LUE.   
BG goal 140-180    Continue home insulin pump (see below for settings)   BG monitoring ac/hs/0200    In the event of pump malfunction- notify endocrine for SQ insulin orders     ** Please call Endocrine for any BG related issues **      
BG goal 140-180    Continue home insulin pump (see below for settings)   BG monitoring ac/hs/0200    In the event of pump malfunction- notify endocrine for SQ insulin orders     ** Please call Endocrine for any BG related issues **      
BG goal 140-180    Continue home insulin pump (see below for settings)   BG monitoring ac/hs/0200    In the event of pump malfunction- notify endocrine for SQ insulin orders     ** Please call Endocrine for any BG related issues **    Discharge plans:  Continue home insulin pump     
BG goal 140-180    Hypoglycemia noted yesterday evening. Resolved with glucose tabs and eating. Will monitor and consider pump setting adjustments if continued downward BG trends noted.     Continue home insulin pump (see below for settings)   BG monitoring ac/hs/0200    In the event of pump malfunction- notify endocrine for SQ insulin orders     ** Please call Endocrine for any BG related issues **      
BG goal 140-180    Insulin pump removed prior to OR. Remains in PACU at this time w/ BG above goal ranges (264). Patient does not have more insulin pump supplies with him to replace at this time.     Plan:   -Start Transition IV insulin infusion at 1.5 u/hr with stepdown parameters (lower rate of basal settings on pump)  -Start Novolog ICR 1:3 (1 unit per 3g of carbs) TID with meals (based on pump ICR)  -Start Moderate Dose Correction Scale  -BG monitoring ac/hs/0200    ** Please call Endocrine for any BG related issues **    Will plan to resume insulin pump once patient has supplies delivered to bedside   
Body mass index is 29.98 kg/m². Morbid obesity complicates all aspects of disease management from diagnostic modalities to treatment.   -pt on mounjaro as outpatient.        
Body mass index is 33.78 kg/m².  May increase insulin resistance.       
Body mass index is 33.78 kg/m². Morbid obesity complicates all aspects of disease management from diagnostic modalities to treatment.   -pt on mounjaro as outpatient. And has been losing weight with this. Hold mounjaro until all surgerise completed as risk of aspiration of stomach contents with anesthesia so can resume once out of hospital and discussed with him       
Chronic, controlled. Latest blood pressure and vitals reviewed-     Temp:  [96.3 °F (35.7 °C)-98.7 °F (37.1 °C)]   Pulse:  [69-99]   Resp:  [18-20]   BP: (121-189)/(66-88)   SpO2:  [96 %-99 %] .   Home meds for hypertension were reviewed and noted below.   Hypertension Medications               losartan (COZAAR) 50 MG tablet TAKE 1 TABLET(50 MG) BY MOUTH EVERY DAY            -reported to be 70s systolic with EMS and holding losartan further as possible contributor to presyncopal episode. Normotensive now on 7/20 but ticking up 7/21, possible technique as difficulty getting BP as left arm post op and insulin pump in right arm so using right wrist, right forearm. Prn hdyralazine added for now as overly cautious about overcorrecting before resuming home losartan given above  
Chronic, controlled. Latest blood pressure and vitals reviewed-     Temp:  [97 °F (36.1 °C)-98.4 °F (36.9 °C)]   Pulse:  [79-97]   Resp:  [10-20]   BP: ()/(50-77)   SpO2:  [92 %-100 %] .   Home meds for hypertension were reviewed and noted below.   Hypertension Medications               losartan (COZAAR) 50 MG tablet TAKE 1 TABLET(50 MG) BY MOUTH EVERY DAY            -reported to be 70s systolic with EMS and holding losartan further as possible contributor to presyncopal episode. Normotensive now  
Chronic, controlled. Latest blood pressure and vitals reviewed-     Temp:  [97.3 °F (36.3 °C)-97.7 °F (36.5 °C)]   Pulse:  [68-81]   Resp:  [10-19]   BP: (110-141)/(55-72)   SpO2:  [93 %-100 %] .   Home meds for hypertension were reviewed and noted below.   Hypertension Medications               losartan (COZAAR) 50 MG tablet TAKE 1 TABLET(50 MG) BY MOUTH EVERY DAY            While in the hospital, will manage blood pressure as follows; Adjust home antihypertensive regimen as follows- HOLD AT THIS TIME GIVEN REPEAT OR INTERVENTION PLANNED.    RESUME AS APPROPRIATE  
Chronic, controlled. Latest blood pressure and vitals reviewed-     Temp:  [97.5 °F (36.4 °C)-98.3 °F (36.8 °C)]   Pulse:  []   Resp:  [17-20]   BP: (135-186)/(69-88)   SpO2:  [93 %-98 %] .   Home meds for hypertension were reviewed and noted below.   Hypertension Medications               losartan (COZAAR) 50 MG tablet TAKE 1 TABLET(50 MG) BY MOUTH EVERY DAY            -reported to be 70s systolic with EMS and holding losartan further as possible contributor to presyncopal episode. Normotensive now on 7/20 but ticking up 7/21, possible technique as difficulty getting BP as left arm post op and insulin pump in right arm so using right wrist, right forearm. Prn hdyralazine added for now as overly cautious about overcorrecting before resuming home losartan given above  Received hydralazine once 7/21 around 1 pm with improved BPs more 130s-150s now, so trend, and if spikes again can consider losartan, cuatious as above  
Continue home nortriptyline + gabapentin + PRN tizanidine    
Continue home nortriptyline + gabapentin + meds for acute pain now  Follows closely with pain management for chronic back pain and receives injections long term for this    
Cortes Schroeder is a 64 y.o. male presenting with history of DM2 presents with right trimalleolar ankle fracture with skin tenting & non-displaced left distal radius fracture. No signs of ecchymosis or petechial over the ankle. He is neuro intact. He was closed reduced and splinted in the ED. Ankle was grossly unstable in a splint. He is admitted to  for evaluation of syncopal episodes. He is currently hemodynamically stable. HE is s/p right ankle ex-fix on 07/19. Plan for definitive fixation when soft tissue is amenable to surgery. Plan for ORIF L distal radius today.    - NWB RLE  - DVT Prophylaxis: SCDs at all times while in bed  - Pain control: multimodal pain management  - PT/OT: Eval & treat NWB RLE  - Abx: 24h post op ancef  - NPO       
Cortes Schroeder is a 64 y.o. male presenting with history of DM2 presents with right trimalleolar ankle fracture with skin tenting & non-displaced left distal radius fracture. No signs of ecchymosis or petechial over the ankle. He is neuro intact. He was closed reduced and splinted in the ED. Ankle was grossly unstable in a splint. He is admitted to  for evaluation of syncopal episodes. He is currently hemodynamically stable. He is s/p right ankle ex-fix on 07/19, L distal radius 07/19. Plan for definitive fixation when soft tissue is amenable to surgery. He has slow but improving range of motion to LUE extremity    - NWB RLE. LUE weightbearing at the elbow. Full ROM LUE  - DVT Prophylaxis: SCDs at all times while in bed. ASA.  - Pain control: multimodal pain management  - PT/OT: Eval & treat NWB RLE  - Abx: 24h post op ancef       
Cortes Schroeder is a 64 y.o. male presenting with history of DM2 presents with right trimalleolar ankle fracture with skin tenting & non-displaced left distal radius fracture. No signs of ecchymosis or petechial over the ankle. He is neuro intact. He was closed reduced and splinted in the ED. Ankle was grossly unstable in a splint. He is admitted to  for evaluation of syncopal episodes. He is currently hemodynamically stable. Plan for ex-fix of right ankle today. Pt is currently NPO, booked, marked and consented. Non-displaced distal radius fracture was placed in a splint.      - NWB RLE  - DVT Prophylaxis: SCDs at all times while in bed  - Pain control: multimodal pain management  - PT/OT: Eval & treat NWB RLE       
Cortes Schroeder is a 64 y.o. male presenting with history of DM2 presents with right trimalleolar ankle fracture with skin tenting & non-displaced left distal radius fracture. No signs of ecchymosis or petechial over the ankle. He is neuro intact. He was closed reduced and splinted in the ED. Ankle was grossly unstable in a splint. He is admitted to  for evaluation of syncopal episodes. He is s/p right ankle ex-fix on 07/18, L distal radius 07/19. Plan for definitive fixation when soft tissue is amenable to surgery, tentatively planned for Friday 07/26/2024.    - DVT Prophylaxis: SCDs at all times while in bed. ASA.  - Pain control: multimodal pain management  - PT/OT: Eval & treat RENE AGUIRRE         
Cortes Schroeder is a 64 y.o. male presenting with history of DM2 presents with right trimalleolar ankle fracture with skin tenting & non-displaced left distal radius fracture. No signs of ecchymosis or petechial over the ankle. He is neuro intact. He was closed reduced and splinted in the ED. Ankle was grossly unstable in a splint. He is admitted to  for evaluation of syncopal episodes. He is s/p right ankle ex-fix on 07/19, L distal radius 07/19. Plan for definitive fixation when soft tissue is amenable to surgery.    - NWB RLE. LUE weightbearing at the elbow. Full ROM LUE  - DVT Prophylaxis: SCDs at all times while in bed. ASA.  - Pain control: multimodal pain management  - PT/OT: Eval & treat NWB RLE         
Cortes Schroeder is a 64 y.o. male presenting with history of DM2 presents with right trimalleolar ankle fracture with skin tenting & non-displaced left distal radius fracture. No signs of ecchymosis or petechial over the ankle. He is neuro intact. He was closed reduced and splinted in the ED. Ankle was grossly unstable in a splint. He is admitted to  for evaluation of syncopal episodes. He is s/p right ankle ex-fix on 07/19, L distal radius 07/19. Plan for definitive fixation when soft tissue is amenable to surgery.    - NWB RLE. LUE weightbearing at the elbow. Full ROM LUE  - DVT Prophylaxis: SCDs at all times while in bed. ASA.  - Pain control: multimodal pain management  - PT/OT: Eval & treat NWB RLE  - Abx: 24h post op ancef completed       
Etiology unclear  -pt was not hypoglycemic on arrival  -possible had syncope with 1st fall  -check ECHO  -keep  on cardiac monitoring    
Free T4 nromal with TSH 6. Repeat in 6 weeks to document stability    
Given IV fluids in ED - trend chemistry value  Recent Labs   Lab 07/18/24  1617   *     
Given IV fluids in ED and on 7/19 post op - with improved and normalized now  Recent Labs   Lab 07/20/24  0515          
Given IV fluids in ED and on 7/19 post op - with improved and normalized now  Recent Labs   Lab 07/21/24  0511        He reports has been a chronic issue in the past and gone to the ER by PCP referral for this and given saline with improvements  Discussed on 7/21 if strenous activity/working out/sweating once recovered from this injury, would use isotonic fluid to hydrate like sugar free gatorade/pedialyte as can sometimes dilute with straight water alone as recovery fluid. Can also have pseudohypnatremia if glucose is highly elevated  
He is s/p L  ORIF on 07/19.     WBAT through the elbow, nonweightbearing left wrist and hand    
He is s/p L DR DANG on 07/19. Able to make Ok sign, cross fingers and give thumb up.     WBAT through the elbow  Full range of motion  
He is s/p L DR DANG on 07/19. Improving motor and sensory function of the hand and fingers, likely due to slow recovery from regional block. Will continue to monitor    WBAT through the elbow  Full range of motion  
Insulin Pump:  Patient has the ability and wherewithal to manage the pump.  Order has been placed to notify nurse of patient using own pump. (Order Set: Adult and Pediatric Home Insulin Pump)     Humalog via OmniPod insulin pump  Current pump settings:  Basal 12 am to 2.3 and 6 am to 1.55  ICR to 3 at 12 am, 2 at 4 pm  ISF to 1:20   AIT 3 hrs  Target 110-120      Pump type:    Change infusion set: Every 1-1.5 days   Change insertion site: with change of infusion set  Location of insertion site: Abdomen  State of insertion site: appears clean  
Insulin Pump:  Patient has the ability and wherewithal to manage the pump.  Order has been placed to notify nurse of patient using own pump. (Order Set: Adult and Pediatric Home Insulin Pump)     Humalog via OmniPod insulin pump  Current pump settings:  Basal 12 am to 2.3 and 6 am to 1.55  ICR to 3 at 12 am, 2 at 4 pm  ISF to 1:20   AIT 3 hrs  Target 110-120      Pump type:    Change infusion set: Every 1-1.5 days (7/19/24)  Change insertion site: with change of infusion set. (7/19/24)   Location of insertion site: Abdomen  State of insertion site: appears clean  
Managed per primary team  Optimize BG control      
Patient planned for 2nd OR intervention later today - to remain NPO   F/u orthopedic surgery notes/recs     
Patient still has Insulin pump on   -insulin pump orderset entered  -endocrinology consult in AM to assist.  Patient states humalog brand short acting insulin does not work for him or produce proper effect.  At this time he would prefer to maintain his insulin pump.     
Patient's FSGs are uncontrolled due to hyperglycemia on current medication regimen.  Last A1c reviewed-   Lab Results   Component Value Date    LABA1C 10.8 (H) 08/15/2016    HGBA1C 9.9 (H) 07/18/2024     Most recent fingerstick glucose reviewed-   Recent Labs   Lab 07/19/24  1431 07/19/24  1535 07/20/24  0750   POCTGLUCOSE 264* 249* 122*     Current correctional scale  Medium  Maintain anti-hyperglycemic dose as follows-   Antihyperglycemics (From admission, onward)      Start     Stop Route Frequency Ordered    07/19/24 1645  insulin lispro insulin pump from home 50 Units        Question Answer Comment   Target number 120    Basal Rate #1 2.3    Basal rate #1 time 7419-7123    Basal Rate #2 1.55    Basal rate #2 time 4209-2159        -- SubQ Continuous 07/19/24 1534    07/19/24 1632  insulin lispro insulin pump from home 1-20 Units        Question Answer Comment   Target number 120    Carbohydrate coverage #1 1:2    Carbohydrate coverage #1 time 5866-8441    Carbohydrate coverage #2 1:3    Carbohydrate coverage #2 time 7368-0479    Sensitivity #1 1:20    Sensitivity #1 time [id number]        -- SubQ As needed (PRN) 07/19/24 1534          Hold Oral hypoglycemics while patient is in the hospital.  -has been workign with outpatient endocrine MD long term. Has been diabetic since 1995.  -DM diet, insulin pump in plaec with endo managing  -hold mounjaro until all surgeries completed    
Patient's FSGs are uncontrolled due to hyperglycemia on current medication regimen.  Last A1c reviewed-   Lab Results   Component Value Date    LABA1C 10.8 (H) 08/15/2016    HGBA1C 9.9 (H) 07/18/2024     Most recent fingerstick glucose reviewed-   Recent Labs   Lab 07/21/24  1614 07/21/24  2031 07/22/24  0818   POCTGLUCOSE 252* 62* 173*       Current correctional scale  Medium  Maintain anti-hyperglycemic dose as follows-   Antihyperglycemics (From admission, onward)      Start     Stop Route Frequency Ordered    07/19/24 1645  insulin lispro insulin pump from home 50 Units        Question Answer Comment   Target number 120    Basal Rate #1 2.3    Basal rate #1 time 6892-2142    Basal Rate #2 1.55    Basal rate #2 time 6203-7001        -- SubQ Continuous 07/19/24 1534    07/19/24 1632  insulin lispro insulin pump from home 1-20 Units        Question Answer Comment   Target number 120    Carbohydrate coverage #1 1:2    Carbohydrate coverage #1 time 9401-4818    Carbohydrate coverage #2 1:3    Carbohydrate coverage #2 time 8090-3956    Sensitivity #1 1:20    Sensitivity #1 time [id number]        -- SubQ As needed (PRN) 07/19/24 1534          Hold Oral hypoglycemics while patient is in the hospital.  -has been workign with outpatient endocrine MD long term. Has been diabetic since 1995.  -DM diet, insulin pump in plaec with endo managing  -hold mounjaro until all surgeries completed  Lows overnight 7/22 to 40s-60s and requried sandwich, glucose tabs to improve. He reports happens at home every now and then, oral intake was good last night, so will f/u endo adjustments    
S/p operative intervention tonight  -NWB RLE  -elevate extremity  -discontinued prn oxycodone- replaced with Morphine IR  -f/u Orthopedic surgery consult notes    
Secondary to fall  S/p ORIF on 7/19 with dr velez with ORIF and Synthes distal radius VA volar plate and screws   -recs for PT/OT training with a platform walker for gait with weight-bearing across the left elbow and minimal gripping of the post. Range of motion of the fingers on the left hand.   Multimodal pain management limiting narcotics. Tight blood glucose control.   -asa BID for ppx  -sling and splint in place    
Sustained in fall with concern for presyncope episodes with dizziness and blackout type episode. Possible poor intake/dehydration contributor with only banana eaten that day before episode and hyponatremia/DEEPALI on admit with improevments now as well as hypotension as BP 70s in the field   -maintain on telemetry, echo normal with only mild elevation in PAP  -trop negative, UA negative. Tsh mildly up but free T4 normal.  -s/p ex fix on 7/18 PM with dr velez and [facility] site care BID, elevate and ice. Fixation likely 7/26 pending swellign ipmrovements. Multimodal pain medications. NWB to RLE, PT/OT consulted. Recs high intensity therapy, with UE and LE fx and likely  good rehab candidate  and PM and R consulted   Will also likely need FMLA Paperwork as likely javier have extended NWB time for suspected at least 2 months but will f/u ortho recs post ORIF later this week but typical times for this type of injury at minimum usually  -bowel regimen  -ASA BID for ppx      
Sustained in fall with concern for presyncope episodes with dizziness and blackout type episode. Possible poor intake/dehydration contributor with only banana eaten that day before episode and hyponatremia/DEEPALI on admit with improevments now as well as hypotension as BP 70s in the field   -maintain on telemetry, echo normal with only mild elevation in PAP  -trop negative, UA negative. Tsh mildly up but free T4 normal.  -s/p ex fix on 7/18 PM with dr velez and pin site care BID, elevate and ice. Fixation likely 7/26 pending swellign ipmrovements. Multimodal pain medications. NWB to RLE, PT/OT consulted. Recs high intensity therapy, with UE and LE fx may be good rehab candidate and will discuss with CM tomorrow. If qualifies for rehab may be able to dc and come back for same day surgery for fixation but still very likely may have to wait to dc until af after definitive fixation, will f//u with CM tomorrow more about optinos. Will also likely need FMLA Paperwork as likely javier have extended NWB time for suspected at least 2 months but will f/u ortho recs post ORIF later this week but typical times for this type of injury at minimum usually  -bowel regimen  -ASA BID for ppx      
Sustained in fall with concern for presyncope episodes with dizziness and blackout type episode. Possible poor intake/dehydration contributor with only banana eaten that day before episode and hyponatremia/DEEPALI on admit with improevments now as well as hypotension as BP 70s in the field and normotension now without losartan. Cont to hold.  -maintain on telemetry, echo pending  -trop negative, UA negative. Tsh mildly up but free T4 normal.  -s/p ex fix on 7/18 PM with dr velez and [facility] site care BID, elevate and ice. Fixation likely 7/26 pending swellign ipmrovements. Multimodal pain medications. NWB to RLE, PT/OT consulted. Likely will need SNF, suspect will be after definitive fixation, will f//u with CM  -bowel regimen  -ASA BID for ppx      
Trend chemistry panel    -if worsening - gabapentin and additional medications may need adjusted.     
Unclear if he had syncope  -cardiac monitoring  -ECHO to eval structural abnormality given his risk factors   -he can't bear weight so orthostatic vitals not of utility.     
Workup with echo normal, cont tele  Culprits may have been dehydration with mary/hyponatremia on admit, Hypotension with 70s systolic with EMS he reports (hold losartan). No signs of hypoglycemia as contributor  -other labs stable on work up    
Workup with echo pending, cont tele  Culprits may have been dehydration with mary/hyponatremia on admit, Hypotension with 70s systolic with EMS he reports (hold losartan). No signs of hypoglycemia as contributor  -other labs stable on work up    
No

## 2024-07-23 NOTE — NURSING
Nurses Note -- 4 Eyes      7/23/2024   9:57 AM      Skin assessed during: Daily Assessment      [x] No Altered Skin Integrity Present    []Prevention Measures Documented      [] Yes- Altered Skin Integrity Present or Discovered   [] LDA Added if Not in Epic (Describe Wound)   [] New Altered Skin Integrity was Present on Admit and Documented in LDA   [] Wound Image Taken    Wound Care Consulted? No    Attending Nurse:  Yamilex Hoffmann RN/Staff Member:   NIEVES Kunz

## 2024-07-23 NOTE — SUBJECTIVE & OBJECTIVE
Past Medical History:   Diagnosis Date    Anxiety 12/18/2012    Back pain 12/18/2012    Cataract     Chronic pain syndrome 4/24/2016    Diabetes type 2, controlled 2/20/2016    Diabetic retinopathy     DM (diabetes mellitus) 12/18/2012    DM (diabetes mellitus), type 2, uncontrolled 11/16/2013    Essential hypertension 2/20/2016    Gastroesophageal reflux disease without esophagitis 2/20/2016    Hyperlipidemia 12/18/2012    Insomnia 8/7/2014    Neuropathy 11/16/2013     Past Surgical History:   Procedure Laterality Date    APPLICATION, EXTERNAL FIXATION DEVICE, FOR ANKLE FRACTURE Right 7/18/2024    Procedure: APPLICATION, EXTERNAL FIXATION DEVICE, FOR ANKLE FRACTURE;  Surgeon: Moe Liz MD;  Location: [facility] OR [address] [place] [address];  Service: Orthopedics;  Laterality: Right;    BACK SURGERY  2003    Lumbar Spine    CATARACT EXTRACTION      CLOSED REDUCTION, FRACTURE, ANKLE, TRIMALLEOLAR Right 7/18/2024    Procedure: CLOSED REDUCTION, FRACTURE, ANKLE, TRIMALLEOLAR;  Surgeon: Moe Liz MD;  Location: [facility] OR [address] [place] [address];  Service: Orthopedics;  Laterality: Right;    EPIDURAL STEROID INJECTION N/A 1/16/2019    Procedure: Injection, Steroid, Epidural Cervical;  Surgeon: Andrew Sinclair Jr., MD;  Location: [facility] ENDO;  Service: Pain Management;  Laterality: N/A;  Cervical Epidural Steroid Injection C7-T1    56922    Arrive @ 1240    EPIDURAL STEROID INJECTION N/A 2/20/2019    Procedure: Injection, Steroid, Epidural;  Surgeon: Andrew Sinclair Jr., MD;  Location: [facility] ENDO;  Service: Pain Management;  Laterality: N/A;  Lumbar Epidural Steroid Injection L4-5    90341    Arrive @ 1215 (requests latest time)    INJECTION, SPINE, LUMBOSACRAL, TRANSFORAMINAL APPROACH Bilateral 6/14/2024    Procedure: Bilateral L5 Transforaminal Epidural Steroid Injections;  Surgeon: Andrew Sinclair Jr., MD;  Location: [facility] PAIN MANAGEMENT;  Service: Pain Management;  Laterality: Bilateral;  @1300(given)  ASA last 6/8  Check BG  MD  Sign.    OPEN REDUCTION AND INTERNAL FIXATION (ORIF) OF FRACTURE OF DISTAL RADIUS Left 7/19/2024    Procedure: ORIF, FRACTURE, RADIUS, DISTAL - LEFT;  Surgeon: Moe Liz MD;  Location: [facility] OR [address] [place] [address];  Service: Orthopedics;  Laterality: Left;  LEFT, SYNTHES, C ARM     Review of patient's allergies indicates:   Allergen Reactions    Invokana [canagliflozin] Anaphylaxis    Percocet [oxycodone-acetaminophen] Nausea Only and Hallucinations    Biaxin [clarithromycin]     Hydrocodone Other (See Comments)     Dizzy/nausea/hallucinations    Sulfa (sulfonamide antibiotics) Nausea Only and Rash       Scheduled Medications:    acetaminophen  650 mg Oral Q6H    aspirin  81 mg Oral BID    atorvastatin  40 mg Oral Daily    celecoxib  200 mg Oral Daily    cyanocobalamin  1,000 mcg Oral Daily    DULoxetine  60 mg Oral Daily    emtricitabine-tenofovir 200-300 mg  1 tablet Oral Daily    gabapentin  600 mg Oral Daily    And    gabapentin  1,200 mg Oral QHS    magnesium oxide  200 mg Oral Daily    nortriptyline  50 mg Oral Nightly    omega 3-dha-epa-fish oil  1 capsule Oral BID    polyethylene glycol  17 g Oral Daily    senna-docusate 8.6-50 mg  1 tablet Oral BID    vitamin D  1,000 Units Oral BID       PRN Medications:   Current Facility-Administered Medications:     acetaminophen, 650 mg, Oral, Q4H PRN    aluminum-magnesium hydroxide-simethicone, 30 mL, Oral, QID PRN    bisacodyL, 10 mg, Rectal, Daily PRN    dextrose 10%, 12.5 g, Intravenous, PRN    dextrose 10%, 12.5 g, Intravenous, PRN    dextrose 10%, 25 g, Intravenous, PRN    dextrose 10%, 25 g, Intravenous, PRN    glucagon (human recombinant), 1 mg, Intramuscular, PRN    glucose, 16 g, Oral, PRN    glucose, 24 g, Oral, PRN    hydrALAZINE, 25 mg, Oral, Q8H PRN    insulin lispro, 1-20 Units, Subcutaneous, PRN    melatonin, 6 mg, Oral, Nightly PRN    methocarbamoL, 500 mg, Oral, QID PRN    morphine, 15 mg, Oral, Q4H PRN    naloxone, 0.02 mg, Intravenous, PRN     ondansetron, 4 mg, Intravenous, Q8H PRN    prochlorperazine, 5 mg, Intravenous, Q6H PRN    sodium chloride 0.9%, 10 mL, Intravenous, Q6H PRN    Family History       Problem Relation (Age of Onset)    Alzheimer's disease Father    Cataracts Father    Heart attack Mother    Hypertension Father, Mother    Migraines Mother    Parkinsonism Father          Tobacco Use    Smoking status: Never    Smokeless tobacco: Never   Substance and Sexual Activity    Alcohol use: Yes     Alcohol/week: 1.0 standard drink of alcohol     Types: 1 Glasses of wine per week     Comment: occasionally    Drug use: No    Sexual activity: Not Currently     Partners: Male     Birth control/protection: Condom     Comment: 10/2/17      Review of Systems   Constitutional:  Positive for activity change. Negative for fatigue and fever.   HENT:  Negative for trouble swallowing and voice change.    Respiratory:  Negative for cough and shortness of breath.    Cardiovascular:  Negative for chest pain and leg swelling.   Gastrointestinal:  Negative for abdominal distention and abdominal pain.   Genitourinary:  Negative for flank pain.   Musculoskeletal:  Positive for gait problem. Negative for back pain.   Skin:  Negative for color change and rash.   Neurological:  Positive for weakness. Negative for speech difficulty and numbness.   Psychiatric/Behavioral:  Negative for confusion.      Objective:     Vital Signs (Most Recent):  Temp: 97.8 °F (36.6 °C) (07/23/24 0442)  Pulse: 88 (07/23/24 0442)  Resp: 18 (07/23/24 0442)  BP: (!) 164/80 (07/23/24 0442)  SpO2: 97 % (07/23/24 0442)    Vital Signs (24h Range):  Temp:  [97.7 °F (36.5 °C)-98 °F (36.7 °C)] 97.8 °F (36.6 °C)  Pulse:  [83-98] 88  Resp:  [17-18] 18  SpO2:  [93 %-97 %] 97 %  BP: (146-173)/(71-85) 164/80     Body mass index is 33.78 kg/m².     Physical Exam  Vitals and nursing note reviewed.   Constitutional:       Appearance: He is well-developed.   HENT:      Head: Normocephalic and  atraumatic.   Eyes:      General:         Right eye: No discharge.         Left eye: No discharge.      Pupils: Pupils are equal, round, and reactive to light.   Pulmonary:      Effort: Pulmonary effort is normal. No respiratory distress.   Abdominal:      General: There is no distension.      Palpations: Abdomen is soft.      Tenderness: There is no abdominal tenderness.   Musculoskeletal:         General: No deformity.      Cervical back: Neck supple.      Comments: RLE with ex fix in place, Pin sites covered in gauze  LUE with sling in place   Skin:     General: Skin is warm and dry.   Neurological:      Mental Status: He is alert and oriented to person, place, and time. Mental status is at baseline.      Sensory: No sensory deficit.      Motor: Weakness present. No abnormal muscle tone.      Gait: Gait abnormal.   Psychiatric:         Mood and Affect: Mood normal.         Behavior: Behavior normal.          NEUROLOGICAL EXAMINATION:     MENTAL STATUS   Oriented to person, place, and time.     CRANIAL NERVES     CN III, IV, VI   Pupils are equal, round, and reactive to light.      Diagnostic Results: Labs: Reviewed  ECG: Reviewed  X-Ray: Reviewed

## 2024-07-23 NOTE — SUBJECTIVE & OBJECTIVE
"Interval HPI:   Overnight events: POD 4. Remains in 541A. NAEON. BG within goal ranges on home insulin pump. Possible d/c to rehab today. Diet diabetic 2000 Calorie    Eatin%  Nausea: No  Hypoglycemia and intervention: No  Fever: No  TPN and/or TF: No  If yes, type of TF/TPN and rate: n/a    BP (!) 154/80 (BP Location: Right arm, Patient Position: Lying)   Pulse 86   Temp 98.1 °F (36.7 °C) (Oral)   Resp 18   Ht 5' 5" (1.651 m)   Wt 92.1 kg (203 lb)   SpO2 (!) 93%   BMI 33.78 kg/m²     Labs Reviewed and Include    Recent Labs   Lab 24  0604   *   CALCIUM 8.7   ALBUMIN 2.4*   PROT 5.9*      K 4.6   CO2 24      BUN 19   CREATININE 1.0   ALKPHOS 124   ALT 26   AST 29   BILITOT 0.4     Lab Results   Component Value Date    WBC 8.81 2024    HGB 11.8 (L) 2024    HCT 35.9 (L) 2024    MCV 88 2024     2024     Recent Labs   Lab 24  1617   TSH 6.243*   FREET4 0.98     Lab Results   Component Value Date    HGBA1C 9.9 (H) 2024       Nutritional status:   Body mass index is 33.78 kg/m².  Lab Results   Component Value Date    ALBUMIN 2.4 (L) 2024    ALBUMIN 2.7 (L) 2024    ALBUMIN 2.8 (L) 2024     Lab Results   Component Value Date    PREALBUMIN 13 (L) 2024       Estimated Creatinine Clearance: 78.8 mL/min (based on SCr of 1 mg/dL).    Accu-Checks  Recent Labs     24  1141 24  1642 24  1614 24  20324  0818 24  1214 24  20524  0822   POCTGLUCOSE 132* 92 252* 62* 173* 197* 131* 171*       Current Medications and/or Treatments Impacting Glycemic Control  Immunotherapy:    Immunosuppressants       None          Steroids:   Hormones (From admission, onward)      Start     Stop Route Frequency Ordered    24 0145  melatonin tablet 6 mg         -- Oral Nightly PRN 24 0048          Pressors:    Autonomic Drugs (From admission, onward)      None      "     Hyperglycemia/Diabetes Medications:   Antihyperglycemics (From admission, onward)      Start     Stop Route Frequency Ordered    07/19/24 1645  insulin lispro insulin pump from home 50 Units        Question Answer Comment   Target number 120    Basal Rate #1 2.3    Basal rate #1 time 3946-1660    Basal Rate #2 1.55    Basal rate #2 time 8529-2615        -- SubQ Continuous 07/19/24 1534    07/19/24 1632  insulin lispro insulin pump from home 1-20 Units        Question Answer Comment   Target number 120    Carbohydrate coverage #1 1:2    Carbohydrate coverage #1 time 2003-3317    Carbohydrate coverage #2 1:3    Carbohydrate coverage #2 time 8072-1603    Sensitivity #1 1:20    Sensitivity #1 time [id number]        -- SubQ As needed (PRN) 07/19/24 1534

## 2024-07-23 NOTE — PROGRESS NOTES
"[facility] - [facility]  Endocrinology  Progress Note    Admit Date: 2024     Reason for Consult: Management of T2DM, Hyperglycemia     Surgical Procedure and Date:  24   ORIF, FRACTURE, RADIUS, DISTAL - LEFT (Left)      Diabetes diagnosis year:     Home Diabetes Medications:    Humalog via OmniPod insulin pump  Mounjaro 10 mg weekly     Current pump settings:  Basal 12 am to 2.3 and 6 am to 1.55  ICR to 3 at 12 am, 2 at 4 pm  ISF to 1:20   AIT 3 hrs  Target 110-120       Patient had anaphylaxis reaction to SGLT2 inhibitors specifically Invokana        Lab Results   Component Value Date    LABA1C 10.8 (H) 08/15/2016    HGBA1C 9.9 (H) 2024       How often checking glucose at home? Dexcom G6   BG readings on regimen: variable 40s-300s (mostly on high side)   Hypoglycemia on the regimen?  Yes  Missed doses on regimen?  No    Diabetes Complications include:     Hyperglycemia and Diabetic peripheral neuropathy       Complicating diabetes co morbidities:   HLD, HTN, Obesity       HPI:   Patient is a 64 y.o. male with a diagnosis of anxiety, HLD, HTN, and T2DM who presents to the ED after a fall. Imaging shows right trimalleolar ankle fracture with skin tenting & non-displaced left distal radius fracture. He now presents for the above procedure(s). Patient sees Pepe Meade DO for insulin pump management. Last seen on . Endocrinology consulted for management of T2DM.       Interval HPI:   Overnight events: POD 4. Remains in 541A. NAEON. BG within goal ranges on home insulin pump. Possible d/c to rehab today. Diet diabetic  Calorie    Eatin%  Nausea: No  Hypoglycemia and intervention: No  Fever: No  TPN and/or TF: No  If yes, type of TF/TPN and rate: n/a    BP (!) 154/80 (BP Location: Right arm, Patient Position: Lying)   Pulse 86   Temp 98.1 °F (36.7 °C) (Oral)   Resp 18   Ht 5' 5" (1.651 m)   Wt 92.1 kg (203 lb)   SpO2 (!) 93%   BMI 33.78 kg/m²     Labs Reviewed and Include    Recent " Labs   Lab 07/23/24  0604   *   CALCIUM 8.7   ALBUMIN 2.4*   PROT 5.9*      K 4.6   CO2 24      BUN 19   CREATININE 1.0   ALKPHOS 124   ALT 26   AST 29   BILITOT 0.4     Lab Results   Component Value Date    WBC 8.81 07/23/2024    HGB 11.8 (L) 07/23/2024    HCT 35.9 (L) 07/23/2024    MCV 88 07/23/2024     07/23/2024     Recent Labs   Lab 07/18/24  1617   TSH 6.243*   FREET4 0.98     Lab Results   Component Value Date    HGBA1C 9.9 (H) 07/18/2024       Nutritional status:   Body mass index is 33.78 kg/m².  Lab Results   Component Value Date    ALBUMIN 2.4 (L) 07/23/2024    ALBUMIN 2.7 (L) 07/21/2024    ALBUMIN 2.8 (L) 07/20/2024     Lab Results   Component Value Date    PREALBUMIN 13 (L) 07/18/2024       Estimated Creatinine Clearance: 78.8 mL/min (based on SCr of 1 mg/dL).    Accu-Checks  Recent Labs     07/20/24  1141 07/20/24  1642 07/21/24  1614 07/21/24  2031 07/22/24  0818 07/22/24  1214 07/22/24  2050 07/23/24  0822   POCTGLUCOSE 132* 92 252* 62* 173* 197* 131* 171*       Current Medications and/or Treatments Impacting Glycemic Control  Immunotherapy:    Immunosuppressants       None          Steroids:   Hormones (From admission, onward)      Start     Stop Route Frequency Ordered    07/19/24 0145  melatonin tablet 6 mg         -- Oral Nightly PRN 07/19/24 0048          Pressors:    Autonomic Drugs (From admission, onward)      None          Hyperglycemia/Diabetes Medications:   Antihyperglycemics (From admission, onward)      Start     Stop Route Frequency Ordered    07/19/24 1645  insulin lispro insulin pump from home 50 Units        Question Answer Comment   Target number 120    Basal Rate #1 2.3    Basal rate #1 time 4693-4540    Basal Rate #2 1.55    Basal rate #2 time 6585-1323        -- SubQ Continuous 07/19/24 1534    07/19/24 1632  insulin lispro insulin pump from home 1-20 Units        Question Answer Comment   Target number 120    Carbohydrate coverage #1 1:2     Carbohydrate coverage #1 time 2514-1151    Carbohydrate coverage #2 1:3    Carbohydrate coverage #2 time 8185-7632    Sensitivity #1 1:20    Sensitivity #1 time 6901-6936        -- SubQ As needed (PRN) 07/19/24 7854            ASSESSMENT and PLAN    Endocrine  Insulin pump status  Insulin Pump:  Patient has the ability and wherewithal to manage the pump.  Order has been placed to notify nurse of patient using own pump. (Order Set: Adult and Pediatric Home Insulin Pump)     Humalog via OmniPod insulin pump  Current pump settings:  Basal 12 am to 2.3 and 6 am to 1.55  ICR to 3 at 12 am, 2 at 4 pm  ISF to 1:20   AIT 3 hrs  Target 110-120      Pump type:    Change infusion set: Every 1-1.5 days   Change insertion site: with change of infusion set  Location of insertion site: Abdomen  State of insertion site: appears clean    Obesity  Body mass index is 33.78 kg/m².  May increase insulin resistance.         Uncontrolled type 2 diabetes mellitus with hyperglycemia  BG goal 140-180    Continue home insulin pump (see below for settings)   BG monitoring ac/hs/0200    In the event of pump malfunction- notify endocrine for SQ insulin orders     ** Please call Endocrine for any BG related issues **    Discharge plans:  Continue home insulin pump       Orthopedic  * Closed trimalleolar fracture of right ankle  Managed per primary team  Optimize BG control            Meg Penaloza NP  Endocrinology  [facility] - Surgery

## 2024-07-23 NOTE — NURSING
Patient blood glucose via Dexcom 172. Omnipod bolus of 25.75 units.   Elliptical Excision Additional Text (Leave Blank If You Do Not Want): The margin was drawn around the clinically apparent lesion.  An elliptical shape was then drawn on the skin incorporating the lesion and margins.  Incisions were then made along these lines to the appropriate tissue plane and the lesion was extirpated. Mucosal Advancement Flap Text: Given the location of the defect, shape of the defect and the proximity to free margins a mucosal advancement flap was deemed most appropriate. Incisions were made with a 15 blade scalpel in the appropriate fashion along the cutaneous vermillion border and the mucosal lip. The remaining actinically damaged mucosal tissue was excised.  The mucosal advancement flap was then elevated to the gingival sulcus with care taken to preserve the neurovascular structures and advanced into the primary defect. Care was taken to ensure that precise realignment of the vermillion border was achieved. Complex Repair And Split-Thickness Skin Graft Text: The defect edges were debeveled with a #15 scalpel blade.  The primary defect was closed partially with a complex linear closure.  Given the location of the defect, shape of the defect and the proximity to free margins a split thickness skin graft was deemed most appropriate to repair the remaining defect.  The graft was trimmed to fit the size of the remaining defect.  The graft was then placed in the primary defect, oriented appropriately, and sutured into place. Complex Repair And Rhombic Flap Text: The defect edges were debeveled with a #15 scalpel blade.  The primary defect was closed partially with a complex linear closure.  Given the location of the remaining defect, shape of the defect and the proximity to free margins a rhombic flap was deemed most appropriate for complete closure of the defect.  Using a sterile surgical marker, an appropriate advancement flap was drawn incorporating the defect and placing the expected incisions within the relaxed skin tension lines where possible.    The area thus outlined was incised deep to adipose tissue with a #15 scalpel blade.  The skin margins were undermined to an appropriate distance in all directions utilizing iris scissors. Island Pedicle Flap Text: The defect edges were debeveled with a #15 scalpel blade.  Given the location of the defect, shape of the defect and the proximity to free margins an island pedicle advancement flap was deemed most appropriate.  Using a sterile surgical marker, an appropriate advancement flap was drawn incorporating the defect, outlining the appropriate donor tissue and placing the expected incisions within the relaxed skin tension lines where possible.    The area thus outlined was incised deep to adipose tissue with a #15 scalpel blade.  The skin margins were undermined to an appropriate distance in all directions around the primary defect and laterally outward around the island pedicle utilizing iris scissors.  There was minimal undermining beneath the pedicle flap. W Plasty Text: The lesion was extirpated to the level of the fat with a #15 scalpel blade.  Given the location of the defect, shape of the defect and the proximity to free margins a W-plasty was deemed most appropriate for repair.  Using a sterile surgical marker, the appropriate transposition arms of the W-plasty were drawn incorporating the defect and placing the expected incisions within the relaxed skin tension lines where possible.    The area thus outlined was incised deep to adipose tissue with a #15 scalpel blade.  The skin margins were undermined to an appropriate distance in all directions utilizing iris scissors.  The opposing transposition arms were then transposed into place in opposite direction and anchored with interrupted buried subcutaneous sutures. Additional Primary Defect Length (In Cm): - Dorsal Nasal Flap Text: The defect edges were debeveled with a #15 scalpel blade.  Given the location of the defect and the proximity to free margins a dorsal nasal flap was deemed most appropriate.  Using a sterile surgical marker, an appropriate dorsal nasal flap was drawn around the defect.    The area thus outlined was incised deep to adipose tissue with a #15 scalpel blade.  The skin margins were undermined to an appropriate distance in all directions utilizing iris scissors. Posterior Auricular Interpolation Flap Text: A decision was made to reconstruct the defect utilizing an interpolation axial flap and a staged reconstruction.  A telfa template was made of the defect.  This telfa template was then used to outline the posterior auricular interpolation flap.  The donor area for the pedicle flap was then injected with anesthesia.  The flap was excised through the skin and subcutaneous tissue down to the layer of the underlying musculature.  The pedicle flap was carefully excised within this deep plane to maintain its blood supply.  The edges of the donor site were undermined.   The donor site was closed in a primary fashion.  The pedicle was then rotated into position and sutured.  Once the tube was sutured into place, adequate blood supply was confirmed with blanching and refill.  The pedicle was then wrapped with xeroform gauze and dressed appropriately with a telfa and gauze bandage to ensure continued blood supply and protect the attached pedicle. Complex Repair And V-Y Plasty Text: The defect edges were debeveled with a #15 scalpel blade.  The primary defect was closed partially with a complex linear closure.  Given the location of the remaining defect, shape of the defect and the proximity to free margins a V-Y plasty was deemed most appropriate for complete closure of the defect.  Using a sterile surgical marker, an appropriate advancement flap was drawn incorporating the defect and placing the expected incisions within the relaxed skin tension lines where possible.    The area thus outlined was incised deep to adipose tissue with a #15 scalpel blade.  The skin margins were undermined to an appropriate distance in all directions utilizing iris scissors. Melolabial Transposition Flap Text: The defect edges were debeveled with a #15 scalpel blade.  Given the location of the defect and the proximity to free margins a melolabial flap was deemed most appropriate.  Using a sterile surgical marker, an appropriate melolabial transposition flap was drawn incorporating the defect.    The area thus outlined was incised deep to adipose tissue with a #15 scalpel blade.  The skin margins were undermined to an appropriate distance in all directions utilizing iris scissors. Complex Repair And Skin Substitute Graft Text: The defect edges were debeveled with a #15 scalpel blade.  The primary defect was closed partially with a complex linear closure.  Given the location of the remaining defect, shape of the defect and the proximity to free margins a skin substitute graft was deemed most appropriate to repair the remaining defect.  The graft was trimmed to fit the size of the remaining defect.  The graft was then placed in the primary defect, oriented appropriately, and sutured into place. Intermediate / Complex Repair - Final Wound Length In Cm: 2.3 Size Of Margin In Cm: 1 Advancement Flap (Single) Text: The defect edges were debeveled with a #15 scalpel blade.  Given the location of the defect and the proximity to free margins a single advancement flap was deemed most appropriate.  Using a sterile surgical marker, an appropriate advancement flap was drawn incorporating the defect and placing the expected incisions within the relaxed skin tension lines where possible.    The area thus outlined was incised deep to adipose tissue with a #15 scalpel blade.  The skin margins were undermined to an appropriate distance in all directions utilizing iris scissors. Repair Type: Intermediate Anesthesia Type: 1% lidocaine with epinephrine and a 1:10 solution of 8.4% sodium bicarbonate Mastoid Interpolation Flap Text: A decision was made to reconstruct the defect utilizing an interpolation axial flap and a staged reconstruction.  A telfa template was made of the defect.  This telfa template was then used to outline the mastoid interpolation flap.  The donor area for the pedicle flap was then injected with anesthesia.  The flap was excised through the skin and subcutaneous tissue down to the layer of the underlying musculature.  The pedicle flap was carefully excised within this deep plane to maintain its blood supply.  The edges of the donor site were undermined.   The donor site was closed in a primary fashion.  The pedicle was then rotated into position and sutured.  Once the tube was sutured into place, adequate blood supply was confirmed with blanching and refill.  The pedicle was then wrapped with xeroform gauze and dressed appropriately with a telfa and gauze bandage to ensure continued blood supply and protect the attached pedicle. Skin Substitute Text: The defect edges were debeveled with a #15 scalpel blade.  Given the location of the defect, shape of the defect and the proximity to free margins a skin substitute graft was deemed most appropriate.  The graft material was trimmed to fit the size of the defect. The graft was then placed in the primary defect and oriented appropriately. Epidermal Autograft Text: The defect edges were debeveled with a #15 scalpel blade.  Given the location of the defect, shape of the defect and the proximity to free margins an epidermal autograft was deemed most appropriate.  Using a sterile surgical marker, the primary defect shape was transferred to the donor site. The epidermal graft was then harvested.  The skin graft was then placed in the primary defect and oriented appropriately. Modified Advancement Flap Text: The defect edges were debeveled with a #15 scalpel blade.  Given the location of the defect, shape of the defect and the proximity to free margins a modified advancement flap was deemed most appropriate.  Using a sterile surgical marker, an appropriate advancement flap was drawn incorporating the defect and placing the expected incisions within the relaxed skin tension lines where possible.    The area thus outlined was incised deep to adipose tissue with a #15 scalpel blade.  The skin margins were undermined to an appropriate distance in all directions utilizing iris scissors. Complex Repair And Tissue Cultured Epidermal Autograft Text: The defect edges were debeveled with a #15 scalpel blade.  The primary defect was closed partially with a complex linear closure.  Given the location of the defect, shape of the defect and the proximity to free margins an tissue cultured epidermal autograft was deemed most appropriate to repair the remaining defect.  The graft was trimmed to fit the size of the remaining defect.  The graft was then placed in the primary defect, oriented appropriately, and sutured into place. Bill For Surgical Tray: no Star Wedge Flap Text: The defect edges were debeveled with a #15 scalpel blade.  Given the location of the defect, shape of the defect and the proximity to free margins a star wedge flap was deemed most appropriate.  Using a sterile surgical marker, an appropriate rotation flap was drawn incorporating the defect and placing the expected incisions within the relaxed skin tension lines where possible. The area thus outlined was incised deep to adipose tissue with a #15 scalpel blade.  The skin margins were undermined to an appropriate distance in all directions utilizing iris scissors. Complex Repair And M Plasty Text: The defect edges were debeveled with a #15 scalpel blade.  The primary defect was closed partially with a complex linear closure.  Given the location of the remaining defect, shape of the defect and the proximity to free margins an M plasty was deemed most appropriate for complete closure of the defect.  Using a sterile surgical marker, an appropriate advancement flap was drawn incorporating the defect and placing the expected incisions within the relaxed skin tension lines where possible.    The area thus outlined was incised deep to adipose tissue with a #15 scalpel blade.  The skin margins were undermined to an appropriate distance in all directions utilizing iris scissors. V-Y Plasty Text: The defect edges were debeveled with a #15 scalpel blade.  Given the location of the defect, shape of the defect and the proximity to free margins an V-Y advancement flap was deemed most appropriate.  Using a sterile surgical marker, an appropriate advancement flap was drawn incorporating the defect and placing the expected incisions within the relaxed skin tension lines where possible.    The area thus outlined was incised deep to adipose tissue with a #15 scalpel blade.  The skin margins were undermined to an appropriate distance in all directions utilizing iris scissors. Burow's Advancement Flap Text: The defect edges were debeveled with a #15 scalpel blade.  Given the location of the defect and the proximity to free margins a Burow's advancement flap was deemed most appropriate.  Using a sterile surgical marker, the appropriate advancement flap was drawn incorporating the defect and placing the expected incisions within the relaxed skin tension lines where possible.    The area thus outlined was incised deep to adipose tissue with a #15 scalpel blade.  The skin margins were undermined to an appropriate distance in all directions utilizing iris scissors. Biopsy Photograph Reviewed: Yes Estimated Blood Loss (Cc): minimal Melolabial Interpolation Flap Text: A decision was made to reconstruct the defect utilizing an interpolation axial flap and a staged reconstruction.  A telfa template was made of the defect.  This telfa template was then used to outline the melolabial interpolation flap.  The donor area for the pedicle flap was then injected with anesthesia.  The flap was excised through the skin and subcutaneous tissue down to the layer of the underlying musculature.  The pedicle flap was carefully excised within this deep plane to maintain its blood supply.  The edges of the donor site were undermined.   The donor site was closed in a primary fashion.  The pedicle was then rotated into position and sutured.  Once the tube was sutured into place, adequate blood supply was confirmed with blanching and refill.  The pedicle was then wrapped with xeroform gauze and dressed appropriately with a telfa and gauze bandage to ensure continued blood supply and protect the attached pedicle. Epidermal Sutures: 5-0 Prolene Scalpel Size: 15 blade Post-Care Instructions: I reviewed with the patient in detail post-care instructions. Patient is not to engage in any heavy lifting, exercise, or swimming for the next 14 days. Should the patient develop any fevers, chills, bleeding, severe pain patient will contact the office immediately. Bilobed Transposition Flap Text: The defect edges were debeveled with a #15 scalpel blade.  Given the location of the defect and the proximity to free margins a bilobed transposition flap was deemed most appropriate.  Using a sterile surgical marker, an appropriate bilobe flap drawn around the defect.    The area thus outlined was incised deep to adipose tissue with a #15 scalpel blade.  The skin margins were undermined to an appropriate distance in all directions utilizing iris scissors. Excision Depth: deep subcutaneous fat Intermediate Repair Preamble Text (Leave Blank If You Do Not Want): Undermining was performed with blunt dissection. Complex Repair And Dermal Autograft Text: The defect edges were debeveled with a #15 scalpel blade.  The primary defect was closed partially with a complex linear closure.  Given the location of the defect, shape of the defect and the proximity to free margins an dermal autograft was deemed most appropriate to repair the remaining defect.  The graft was trimmed to fit the size of the remaining defect.  The graft was then placed in the primary defect, oriented appropriately, and sutured into place. Billing Type: Third-Party Bill Alar Island Pedicle Flap Text: The defect edges were debeveled with a #15 scalpel blade.  Given the location of the defect, shape of the defect and the proximity to the alar rim an island pedicle advancement flap was deemed most appropriate.  Using a sterile surgical marker, an appropriate advancement flap was drawn incorporating the defect, outlining the appropriate donor tissue and placing the expected incisions within the nasal ala running parallel to the alar rim. The area thus outlined was incised with a #15 scalpel blade.  The skin margins were undermined minimally to an appropriate distance in all directions around the primary defect and laterally outward around the island pedicle utilizing iris scissors.  There was minimal undermining beneath the pedicle flap. Muscle Hinge Flap Text: The defect edges were debeveled with a #15 scalpel blade.  Given the size, depth and location of the defect and the proximity to free margins a muscle hinge flap was deemed most appropriate.  Using a sterile surgical marker, an appropriate hinge flap was drawn incorporating the defect. The area thus outlined was incised with a #15 scalpel blade.  The skin margins were undermined to an appropriate distance in all directions utilizing iris scissors. X Size Of Lesion In Cm (Optional): 0 No Repair - Repaired With Adjacent Surgical Defect Text (Leave Blank If You Do Not Want): After the excision the defect was repaired concurrently with another surgical defect which was in close approximation. Cartilage Graft Text: The defect edges were debeveled with a #15 scalpel blade.  Given the location of the defect, shape of the defect, the fact the defect involved a full thickness cartilage defect a cartilage graft was deemed most appropriate.  An appropriate donor site was identified, cleansed, and anesthetized. The cartilage graft was then harvested and transferred to the recipient site, oriented appropriately and then sutured into place.  The secondary defect was then repaired using a primary closure. Complex Repair And Double Advancement Flap Text: The defect edges were debeveled with a #15 scalpel blade.  The primary defect was closed partially with a complex linear closure.  Given the location of the remaining defect, shape of the defect and the proximity to free margins a double advancement flap was deemed most appropriate for complete closure of the defect.  Using a sterile surgical marker, an appropriate advancement flap was drawn incorporating the defect and placing the expected incisions within the relaxed skin tension lines where possible.    The area thus outlined was incised deep to adipose tissue with a #15 scalpel blade.  The skin margins were undermined to an appropriate distance in all directions utilizing iris scissors. Complex Repair And Z Plasty Text: The defect edges were debeveled with a #15 scalpel blade.  The primary defect was closed partially with a complex linear closure.  Given the location of the remaining defect, shape of the defect and the proximity to free margins a Z plasty was deemed most appropriate for complete closure of the defect.  Using a sterile surgical marker, an appropriate advancement flap was drawn incorporating the defect and placing the expected incisions within the relaxed skin tension lines where possible.    The area thus outlined was incised deep to adipose tissue with a #15 scalpel blade.  The skin margins were undermined to an appropriate distance in all directions utilizing iris scissors. Complex Repair And Modified Advancement Flap Text: The defect edges were debeveled with a #15 scalpel blade.  The primary defect was closed partially with a complex linear closure.  Given the location of the remaining defect, shape of the defect and the proximity to free margins a modified advancement flap was deemed most appropriate for complete closure of the defect.  Using a sterile surgical marker, an appropriate advancement flap was drawn incorporating the defect and placing the expected incisions within the relaxed skin tension lines where possible.    The area thus outlined was incised deep to adipose tissue with a #15 scalpel blade.  The skin margins were undermined to an appropriate distance in all directions utilizing iris scissors. Advancement Flap (Double) Text: The defect edges were debeveled with a #15 scalpel blade.  Given the location of the defect and the proximity to free margins a double advancement flap was deemed most appropriate.  Using a sterile surgical marker, the appropriate advancement flaps were drawn incorporating the defect and placing the expected incisions within the relaxed skin tension lines where possible.    The area thus outlined was incised deep to adipose tissue with a #15 scalpel blade.  The skin margins were undermined to an appropriate distance in all directions utilizing iris scissors. Complex Repair And Dorsal Nasal Flap Text: The defect edges were debeveled with a #15 scalpel blade.  The primary defect was closed partially with a complex linear closure.  Given the location of the remaining defect, shape of the defect and the proximity to free margins a dorsal nasal flap was deemed most appropriate for complete closure of the defect.  Using a sterile surgical marker, an appropriate flap was drawn incorporating the defect and placing the expected incisions within the relaxed skin tension lines where possible.    The area thus outlined was incised deep to adipose tissue with a #15 scalpel blade.  The skin margins were undermined to an appropriate distance in all directions utilizing iris scissors. Fusiform Excision Additional Text (Leave Blank If You Do Not Want): The margin was drawn around the clinically apparent lesion.  A fusiform shape was then drawn on the skin incorporating the lesion and margins.  Incisions were then made along these lines to the appropriate tissue plane and the lesion was extirpated. O-Z Plasty Text: The defect edges were debeveled with a #15 scalpel blade.  Given the location of the defect, shape of the defect and the proximity to free margins an O-Z plasty (double transposition flap) was deemed most appropriate.  Using a sterile surgical marker, the appropriate transposition flaps were drawn incorporating the defect and placing the expected incisions within the relaxed skin tension lines where possible.    The area thus outlined was incised deep to adipose tissue with a #15 scalpel blade.  The skin margins were undermined to an appropriate distance in all directions utilizing iris scissors.  Hemostasis was achieved with electrocautery.  The flaps were then transposed into place, one clockwise and the other counterclockwise, and anchored with interrupted buried subcutaneous sutures. Rotation Flap Text: The defect edges were debeveled with a #15 scalpel blade.  Given the location of the defect, shape of the defect and the proximity to free margins a rotation flap was deemed most appropriate.  Using a sterile surgical marker, an appropriate rotation flap was drawn incorporating the defect and placing the expected incisions within the relaxed skin tension lines where possible.    The area thus outlined was incised deep to adipose tissue with a #15 scalpel blade.  The skin margins were undermined to an appropriate distance in all directions utilizing iris scissors. Bilateral Helical Rim Advancement Flap Text: The defect edges were debeveled with a #15 blade scalpel.  Given the location of the defect and the proximity to free margins (helical rim) a bilateral helical rim advancement flap was deemed most appropriate.  Using a sterile surgical marker, the appropriate advancement flaps were drawn incorporating the defect and placing the expected incisions between the helical rim and antihelix where possible.  The area thus outlined was incised through and through with a #15 scalpel blade.  With a skin hook and iris scissors, the flaps were gently and sharply undermined and freed up. O-L Flap Text: The defect edges were debeveled with a #15 scalpel blade.  Given the location of the defect, shape of the defect and the proximity to free margins an O-L flap was deemed most appropriate.  Using a sterile surgical marker, an appropriate advancement flap was drawn incorporating the defect and placing the expected incisions within the relaxed skin tension lines where possible.    The area thus outlined was incised deep to adipose tissue with a #15 scalpel blade.  The skin margins were undermined to an appropriate distance in all directions utilizing iris scissors. Double Island Pedicle Flap Text: The defect edges were debeveled with a #15 scalpel blade.  Given the location of the defect, shape of the defect and the proximity to free margins a double island pedicle advancement flap was deemed most appropriate.  Using a sterile surgical marker, an appropriate advancement flap was drawn incorporating the defect, outlining the appropriate donor tissue and placing the expected incisions within the relaxed skin tension lines where possible.    The area thus outlined was incised deep to adipose tissue with a #15 scalpel blade.  The skin margins were undermined to an appropriate distance in all directions around the primary defect and laterally outward around the island pedicle utilizing iris scissors.  There was minimal undermining beneath the pedicle flap. Home Suture Removal Text: Patient was provided a home suture removal kit and will remove their sutures at home.  If they have any questions or difficulties they will call the office. Lip Wedge Excision Repair Text: Given the location of the defect and the proximity to free margins a full thickness wedge repair was deemed most appropriate.  Using a sterile surgical marker, the appropriate repair was drawn incorporating the defect and placing the expected incisions perpendicular to the vermillion border.  The vermillion border was also meticulously outlined to ensure appropriate reapproximation during the repair.  The area thus outlined was incised through and through with a #15 scalpel blade.  The muscularis and dermis were reaproximated with deep sutures following hemostasis. Care was taken to realign the vermillion border before proceeding with the superficial closure.  Once the vermillion was realigned the superfical and mucosal closure was finished. H Plasty Text: Given the location of the defect, shape of the defect and the proximity to free margins a H-plasty was deemed most appropriate for repair.  Using a sterile surgical marker, the appropriate advancement arms of the H-plasty were drawn incorporating the defect and placing the expected incisions within the relaxed skin tension lines where possible. The area thus outlined was incised deep to adipose tissue with a #15 scalpel blade. The skin margins were undermined to an appropriate distance in all directions utilizing iris scissors.  The opposing advancement arms were then advanced into place in opposite direction and anchored with interrupted buried subcutaneous sutures. Paramedian Forehead Flap Text: A decision was made to reconstruct the defect utilizing an interpolation axial flap and a staged reconstruction.  A telfa template was made of the defect.  This telfa template was then used to outline the paramedian forehead pedicle flap.  The donor area for the pedicle flap was then injected with anesthesia.  The flap was excised through the skin and subcutaneous tissue down to the layer of the underlying musculature.  The pedicle flap was carefully excised within this deep plane to maintain its blood supply.  The edges of the donor site were undermined.   The donor site was closed in a primary fashion.  The pedicle was then rotated into position and sutured.  Once the tube was sutured into place, adequate blood supply was confirmed with blanching and refill.  The pedicle was then wrapped with xeroform gauze and dressed appropriately with a telfa and gauze bandage to ensure continued blood supply and protect the attached pedicle. Detail Level: Simple Curvilinear Excision Additional Text (Leave Blank If You Do Not Want): The margin was drawn around the clinically apparent lesion.  A curvilinear shape was then drawn on the skin incorporating the lesion and margins.  Incisions were then made along these lines to the appropriate tissue plane and the lesion was extirpated. Keystone Flap Text: The defect edges were debeveled with a #15 scalpel blade.  Given the location of the defect, shape of the defect a keystone flap was deemed most appropriate.  Using a sterile surgical marker, an appropriate keystone flap was drawn incorporating the defect, outlining the appropriate donor tissue and placing the expected incisions within the relaxed skin tension lines where possible. The area thus outlined was incised deep to adipose tissue with a #15 scalpel blade.  The skin margins were undermined to an appropriate distance in all directions around the primary defect and laterally outward around the flap utilizing iris scissors. Pre-Excision Curettage Text (Leave Blank If You Do Not Want): Prior to drawing the surgical margin the visible lesion was removed with electrodesiccation and curettage to clearly define the lesion size. Wound Care: Vaseline Complex Repair And Double M Plasty Text: The defect edges were debeveled with a #15 scalpel blade.  The primary defect was closed partially with a complex linear closure.  Given the location of the remaining defect, shape of the defect and the proximity to free margins a double M plasty was deemed most appropriate for complete closure of the defect.  Using a sterile surgical marker, an appropriate advancement flap was drawn incorporating the defect and placing the expected incisions within the relaxed skin tension lines where possible.    The area thus outlined was incised deep to adipose tissue with a #15 scalpel blade.  The skin margins were undermined to an appropriate distance in all directions utilizing iris scissors. O-T Advancement Flap Text: The defect edges were debeveled with a #15 scalpel blade.  Given the location of the defect, shape of the defect and the proximity to free margins an O-T advancement flap was deemed most appropriate.  Using a sterile surgical marker, an appropriate advancement flap was drawn incorporating the defect and placing the expected incisions within the relaxed skin tension lines where possible.    The area thus outlined was incised deep to adipose tissue with a #15 scalpel blade.  The skin margins were undermined to an appropriate distance in all directions utilizing iris scissors. O-T Plasty Text: The defect edges were debeveled with a #15 scalpel blade.  Given the location of the defect, shape of the defect and the proximity to free margins an O-T plasty was deemed most appropriate.  Using a sterile surgical marker, an appropriate O-T plasty was drawn incorporating the defect and placing the expected incisions within the relaxed skin tension lines where possible.    The area thus outlined was incised deep to adipose tissue with a #15 scalpel blade.  The skin margins were undermined to an appropriate distance in all directions utilizing iris scissors. Rhombic Flap Text: The defect edges were debeveled with a #15 scalpel blade.  Given the location of the defect and the proximity to free margins a rhombic flap was deemed most appropriate.  Using a sterile surgical marker, an appropriate rhombic flap was drawn incorporating the defect.    The area thus outlined was incised deep to adipose tissue with a #15 scalpel blade.  The skin margins were undermined to an appropriate distance in all directions utilizing iris scissors. Slit Excision Additional Text (Leave Blank If You Do Not Want): A linear line was drawn on the skin overlying the lesion. An incision was made slowly until the lesion was visualized.  Once visualized, the lesion was removed with blunt dissection. Xenograft Text: The defect edges were debeveled with a #15 scalpel blade.  Given the location of the defect, shape of the defect and the proximity to free margins a xenograft was deemed most appropriate.  The graft was then trimmed to fit the size of the defect.  The graft was then placed in the primary defect and oriented appropriately. Crescentic Advancement Flap Text: The defect edges were debeveled with a #15 scalpel blade.  Given the location of the defect and the proximity to free margins a crescentic advancement flap was deemed most appropriate.  Using a sterile surgical marker, the appropriate advancement flap was drawn incorporating the defect and placing the expected incisions within the relaxed skin tension lines where possible.    The area thus outlined was incised deep to adipose tissue with a #15 scalpel blade.  The skin margins were undermined to an appropriate distance in all directions utilizing iris scissors. Purse String (Intermediate) Text: Given the location of the defect and the characteristics of the surrounding skin a pursestring intermediate closure was deemed most appropriate.  Undermining was performed circumfirentially around the surgical defect.  A purstring suture was then placed and tightened. Split-Thickness Skin Graft Text: The defect edges were debeveled with a #15 scalpel blade.  Given the location of the defect, shape of the defect and the proximity to free margins a split thickness skin graft was deemed most appropriate.  Using a sterile surgical marker, the primary defect shape was transferred to the donor site. The split thickness graft was then harvested.  The skin graft was then placed in the primary defect and oriented appropriately. Complex Repair And Single Advancement Flap Text: The defect edges were debeveled with a #15 scalpel blade.  The primary defect was closed partially with a complex linear closure.  Given the location of the remaining defect, shape of the defect and the proximity to free margins a single advancement flap was deemed most appropriate for complete closure of the defect.  Using a sterile surgical marker, an appropriate advancement flap was drawn incorporating the defect and placing the expected incisions within the relaxed skin tension lines where possible.    The area thus outlined was incised deep to adipose tissue with a #15 scalpel blade.  The skin margins were undermined to an appropriate distance in all directions utilizing iris scissors. Complex Repair And Melolabial Flap Text: The defect edges were debeveled with a #15 scalpel blade.  The primary defect was closed partially with a complex linear closure.  Given the location of the remaining defect, shape of the defect and the proximity to free margins a melolabial flap was deemed most appropriate for complete closure of the defect.  Using a sterile surgical marker, an appropriate advancement flap was drawn incorporating the defect and placing the expected incisions within the relaxed skin tension lines where possible.    The area thus outlined was incised deep to adipose tissue with a #15 scalpel blade.  The skin margins were undermined to an appropriate distance in all directions utilizing iris scissors. Consent was obtained from the patient. The risks and benefits to therapy were discussed in detail. Specifically, the risks of infection, scarring, bleeding, prolonged wound healing, incomplete removal, allergy to anesthesia, nerve injury and recurrence were addressed. Prior to the procedure, the treatment site was clearly identified and confirmed by the patient. All components of Universal Protocol/PAUSE Rule completed. Partial Purse String (Intermediate) Text: Given the location of the defect and the characteristics of the surrounding skin an intermediate purse string closure was deemed most appropriate.  Undermining was performed circumferentially around the surgical defect.  A purse string suture was then placed and tightened. Wound tension of the circular defect prevented complete closure of the wound. Deep Sutures: 3-0 Monocryl Anesthesia Volume In Cc: 9 Cheek Interpolation Flap Text: A decision was made to reconstruct the defect utilizing an interpolation axial flap and a staged reconstruction.  A telfa template was made of the defect.  This telfa template was then used to outline the Cheek Interpolation flap.  The donor area for the pedicle flap was then injected with anesthesia.  The flap was excised through the skin and subcutaneous tissue down to the layer of the underlying musculature.  The interpolation flap was carefully excised within this deep plane to maintain its blood supply.  The edges of the donor site were undermined.   The donor site was closed in a primary fashion.  The pedicle was then rotated into position and sutured.  Once the tube was sutured into place, adequate blood supply was confirmed with blanching and refill.  The pedicle was then wrapped with xeroform gauze and dressed appropriately with a telfa and gauze bandage to ensure continued blood supply and protect the attached pedicle. Z Plasty Text: The lesion was extirpated to the level of the fat with a #15 scalpel blade.  Given the location of the defect, shape of the defect and the proximity to free margins a Z-plasty was deemed most appropriate for repair.  Using a sterile surgical marker, the appropriate transposition arms of the Z-plasty were drawn incorporating the defect and placing the expected incisions within the relaxed skin tension lines where possible.    The area thus outlined was incised deep to adipose tissue with a #15 scalpel blade.  The skin margins were undermined to an appropriate distance in all directions utilizing iris scissors.  The opposing transposition arms were then transposed into place in opposite direction and anchored with interrupted buried subcutaneous sutures. Transposition Flap Text: The defect edges were debeveled with a #15 scalpel blade.  Given the location of the defect and the proximity to free margins a transposition flap was deemed most appropriate.  Using a sterile surgical marker, an appropriate transposition flap was drawn incorporating the defect.    The area thus outlined was incised deep to adipose tissue with a #15 scalpel blade.  The skin margins were undermined to an appropriate distance in all directions utilizing iris scissors. Complex Repair Preamble Text (Leave Blank If You Do Not Want): Extensive wide undermining was performed. Composite Graft Text: The defect edges were debeveled with a #15 scalpel blade.  Given the location of the defect, shape of the defect, the proximity to free margins and the fact the defect was full thickness a composite graft was deemed most appropriate.  The defect was outline and then transferred to the donor site.  A full thickness graft was then excised from the donor site. The graft was then placed in the primary defect, oriented appropriately and then sutured into place.  The secondary defect was then repaired using a primary closure. Dermal Autograft Text: The defect edges were debeveled with a #15 scalpel blade.  Given the location of the defect, shape of the defect and the proximity to free margins a dermal autograft was deemed most appropriate.  Using a sterile surgical marker, the primary defect shape was transferred to the donor site. The area thus outlined was incised deep to adipose tissue with a #15 scalpel blade.  The harvested graft was then trimmed of adipose and epidermal tissue until only dermis was left.  The skin graft was then placed in the primary defect and oriented appropriately. Complex Repair And A-T Advancement Flap Text: The defect edges were debeveled with a #15 scalpel blade.  The primary defect was closed partially with a complex linear closure.  Given the location of the remaining defect, shape of the defect and the proximity to free margins an A-T advancement flap was deemed most appropriate for complete closure of the defect.  Using a sterile surgical marker, an appropriate advancement flap was drawn incorporating the defect and placing the expected incisions within the relaxed skin tension lines where possible.    The area thus outlined was incised deep to adipose tissue with a #15 scalpel blade.  The skin margins were undermined to an appropriate distance in all directions utilizing iris scissors. Excision Method: Elliptical Partial Purse String (Simple) Text: Given the location of the defect and the characteristics of the surrounding skin a simple purse string closure was deemed most appropriate.  Undermining was performed circumferentially around the surgical defect.  A purse string suture was then placed and tightened. Wound tension of the circular defect prevented complete closure of the wound. Suture Removal: 14 days Tissue Cultured Epidermal Autograft Text: The defect edges were debeveled with a #15 scalpel blade.  Given the location of the defect, shape of the defect and the proximity to free margins a tissue cultured epidermal autograft was deemed most appropriate.  The graft was then trimmed to fit the size of the defect.  The graft was then placed in the primary defect and oriented appropriately. Island Pedicle Flap With Canthal Suspension Text: The defect edges were debeveled with a #15 scalpel blade.  Given the location of the defect, shape of the defect and the proximity to free margins an island pedicle advancement flap was deemed most appropriate.  Using a sterile surgical marker, an appropriate advancement flap was drawn incorporating the defect, outlining the appropriate donor tissue and placing the expected incisions within the relaxed skin tension lines where possible. The area thus outlined was incised deep to adipose tissue with a #15 scalpel blade.  The skin margins were undermined to an appropriate distance in all directions around the primary defect and laterally outward around the island pedicle utilizing iris scissors.  There was minimal undermining beneath the pedicle flap. A suspension suture was placed in the canthal tendon to prevent tension and prevent ectropion. Spiral Flap Text: The defect edges were debeveled with a #15 scalpel blade.  Given the location of the defect, shape of the defect and the proximity to free margins a spiral flap was deemed most appropriate.  Using a sterile surgical marker, an appropriate rotation flap was drawn incorporating the defect and placing the expected incisions within the relaxed skin tension lines where possible. The area thus outlined was incised deep to adipose tissue with a #15 scalpel blade.  The skin margins were undermined to an appropriate distance in all directions utilizing iris scissors. Hemostasis: Electrocautery Epidermal Closure: running Island Pedicle Flap-Requiring Vessel Identification Text: The defect edges were debeveled with a #15 scalpel blade.  Given the location of the defect, shape of the defect and the proximity to free margins an island pedicle advancement flap was deemed most appropriate.  Using a sterile surgical marker, an appropriate advancement flap was drawn, based on the axial vessel mentioned above, incorporating the defect, outlining the appropriate donor tissue and placing the expected incisions within the relaxed skin tension lines where possible.    The area thus outlined was incised deep to adipose tissue with a #15 scalpel blade.  The skin margins were undermined to an appropriate distance in all directions around the primary defect and laterally outward around the island pedicle utilizing iris scissors.  There was minimal undermining beneath the pedicle flap. Ftsg Text: The defect edges were debeveled with a #15 scalpel blade.  Given the location of the defect, shape of the defect and the proximity to free margins a full thickness skin graft was deemed most appropriate.  Using a sterile surgical marker, the primary defect shape was transferred to the donor site. The area thus outlined was incised deep to adipose tissue with a #15 scalpel blade.  The harvested graft was then trimmed of adipose tissue until only dermis and epidermis was left.  The skin margins of the secondary defect were undermined to an appropriate distance in all directions utilizing iris scissors.  The secondary defect was closed with interrupted buried subcutaneous sutures.  The skin edges were then re-apposed with running  sutures.  The skin graft was then placed in the primary defect and oriented appropriately. Complex Repair And Epidermal Autograft Text: The defect edges were debeveled with a #15 scalpel blade.  The primary defect was closed partially with a complex linear closure.  Given the location of the defect, shape of the defect and the proximity to free margins an epidermal autograft was deemed most appropriate to repair the remaining defect.  The graft was trimmed to fit the size of the remaining defect.  The graft was then placed in the primary defect, oriented appropriately, and sutured into place. Body Location Override (Optional - Billing Will Still Be Based On Selected Body Map Location If Applicable): Left mid back Complex Repair And Transposition Flap Text: The defect edges were debeveled with a #15 scalpel blade.  The primary defect was closed partially with a complex linear closure.  Given the location of the remaining defect, shape of the defect and the proximity to free margins a transposition flap was deemed most appropriate for complete closure of the defect.  Using a sterile surgical marker, an appropriate advancement flap was drawn incorporating the defect and placing the expected incisions within the relaxed skin tension lines where possible.    The area thus outlined was incised deep to adipose tissue with a #15 scalpel blade.  The skin margins were undermined to an appropriate distance in all directions utilizing iris scissors. Complex Repair And Bilobe Flap Text: The defect edges were debeveled with a #15 scalpel blade.  The primary defect was closed partially with a complex linear closure.  Given the location of the remaining defect, shape of the defect and the proximity to free margins a bilobe flap was deemed most appropriate for complete closure of the defect.  Using a sterile surgical marker, an appropriate advancement flap was drawn incorporating the defect and placing the expected incisions within the relaxed skin tension lines where possible.    The area thus outlined was incised deep to adipose tissue with a #15 scalpel blade.  The skin margins were undermined to an appropriate distance in all directions utilizing iris scissors. Helical Rim Advancement Flap Text: The defect edges were debeveled with a #15 blade scalpel.  Given the location of the defect and the proximity to free margins (helical rim) a double helical rim advancement flap was deemed most appropriate.  Using a sterile surgical marker, the appropriate advancement flaps were drawn incorporating the defect and placing the expected incisions between the helical rim and antihelix where possible.  The area thus outlined was incised through and through with a #15 scalpel blade.  With a skin hook and iris scissors, the flaps were gently and sharply undermined and freed up. Complex Repair And Ftsg Text: The defect edges were debeveled with a #15 scalpel blade.  The primary defect was closed partially with a complex linear closure.  Given the location of the defect, shape of the defect and the proximity to free margins a full thickness skin graft was deemed most appropriate to repair the remaining defect.  The graft was trimmed to fit the size of the remaining defect.  The graft was then placed in the primary defect, oriented appropriately, and sutured into place. Purse String (Simple) Text: Given the location of the defect and the characteristics of the surrounding skin a purse string simple closure was deemed most appropriate.  Undermining was performed circumferentially around the surgical defect.  A purse string suture was then placed and tightened. Complex Repair And O-T Advancement Flap Text: The defect edges were debeveled with a #15 scalpel blade.  The primary defect was closed partially with a complex linear closure.  Given the location of the remaining defect, shape of the defect and the proximity to free margins an O-T advancement flap was deemed most appropriate for complete closure of the defect.  Using a sterile surgical marker, an appropriate advancement flap was drawn incorporating the defect and placing the expected incisions within the relaxed skin tension lines where possible.    The area thus outlined was incised deep to adipose tissue with a #15 scalpel blade.  The skin margins were undermined to an appropriate distance in all directions utilizing iris scissors. Bilobed Flap Text: The defect edges were debeveled with a #15 scalpel blade.  Given the location of the defect and the proximity to free margins a bilobe flap was deemed most appropriate.  Using a sterile surgical marker, an appropriate bilobe flap drawn around the defect.    The area thus outlined was incised deep to adipose tissue with a #15 scalpel blade.  The skin margins were undermined to an appropriate distance in all directions utilizing iris scissors. Complex Repair And W Plasty Text: The defect edges were debeveled with a #15 scalpel blade.  The primary defect was closed partially with a complex linear closure.  Given the location of the remaining defect, shape of the defect and the proximity to free margins a W plasty was deemed most appropriate for complete closure of the defect.  Using a sterile surgical marker, an appropriate advancement flap was drawn incorporating the defect and placing the expected incisions within the relaxed skin tension lines where possible.    The area thus outlined was incised deep to adipose tissue with a #15 scalpel blade.  The skin margins were undermined to an appropriate distance in all directions utilizing iris scissors. Trilobed Flap Text: The defect edges were debeveled with a #15 scalpel blade.  Given the location of the defect and the proximity to free margins a trilobed flap was deemed most appropriate.  Using a sterile surgical marker, an appropriate trilobed flap drawn around the defect.    The area thus outlined was incised deep to adipose tissue with a #15 scalpel blade.  The skin margins were undermined to an appropriate distance in all directions utilizing iris scissors. A-T Advancement Flap Text: The defect edges were debeveled with a #15 scalpel blade.  Given the location of the defect, shape of the defect and the proximity to free margins an A-T advancement flap was deemed most appropriate.  Using a sterile surgical marker, an appropriate advancement flap was drawn incorporating the defect and placing the expected incisions within the relaxed skin tension lines where possible.    The area thus outlined was incised deep to adipose tissue with a #15 scalpel blade.  The skin margins were undermined to an appropriate distance in all directions utilizing iris scissors. Hatchet Flap Text: The defect edges were debeveled with a #15 scalpel blade.  Given the location of the defect, shape of the defect and the proximity to free margins a hatchet flap was deemed most appropriate.  Using a sterile surgical marker, an appropriate hatchet flap was drawn incorporating the defect and placing the expected incisions within the relaxed skin tension lines where possible.    The area thus outlined was incised deep to adipose tissue with a #15 scalpel blade.  The skin margins were undermined to an appropriate distance in all directions utilizing iris scissors. Path Notes (To The Dermatopathologist): Please check margins. V-Y Flap Text: The defect edges were debeveled with a #15 scalpel blade.  Given the location of the defect, shape of the defect and the proximity to free margins a V-Y flap was deemed most appropriate.  Using a sterile surgical marker, an appropriate advancement flap was drawn incorporating the defect and placing the expected incisions within the relaxed skin tension lines where possible.    The area thus outlined was incised deep to adipose tissue with a #15 scalpel blade.  The skin margins were undermined to an appropriate distance in all directions utilizing iris scissors. Complex Repair And Rotation Flap Text: The defect edges were debeveled with a #15 scalpel blade.  The primary defect was closed partially with a complex linear closure.  Given the location of the remaining defect, shape of the defect and the proximity to free margins a rotation flap was deemed most appropriate for complete closure of the defect.  Using a sterile surgical marker, an appropriate advancement flap was drawn incorporating the defect and placing the expected incisions within the relaxed skin tension lines where possible.    The area thus outlined was incised deep to adipose tissue with a #15 scalpel blade.  The skin margins were undermined to an appropriate distance in all directions utilizing iris scissors. S Plasty Text: Given the location and shape of the defect, and the orientation of relaxed skin tension lines, an S-plasty was deemed most appropriate for repair.  Using a sterile surgical marker, the appropriate outline of the S-plasty was drawn, incorporating the defect and placing the expected incisions within the relaxed skin tension lines where possible.  The area thus outlined was incised deep to adipose tissue with a #15 scalpel blade.  The skin margins were undermined to an appropriate distance in all directions utilizing iris scissors. The skin flaps were advanced over the defect.  The opposing margins were then approximated with interrupted buried subcutaneous sutures. Bi-Rhombic Flap Text: The defect edges were debeveled with a #15 scalpel blade.  Given the location of the defect and the proximity to free margins a bi-rhombic flap was deemed most appropriate.  Using a sterile surgical marker, an appropriate rhombic flap was drawn incorporating the defect. The area thus outlined was incised deep to adipose tissue with a #15 scalpel blade.  The skin margins were undermined to an appropriate distance in all directions utilizing iris scissors. Ear Star Wedge Flap Text: The defect edges were debeveled with a #15 blade scalpel.  Given the location of the defect and the proximity to free margins (helical rim) an ear star wedge flap was deemed most appropriate.  Using a sterile surgical marker, the appropriate flap was drawn incorporating the defect and placing the expected incisions between the helical rim and antihelix where possible.  The area thus outlined was incised through and through with a #15 scalpel blade. Perilesional Excision Additional Text (Leave Blank If You Do Not Want): The margin was drawn around the clinically apparent lesion. Incisions were then made along these lines to the appropriate tissue plane and the lesion was extirpated. Dressing: dry sterile dressing Advancement-Rotation Flap Text: The defect edges were debeveled with a #15 scalpel blade.  Given the location of the defect, shape of the defect and the proximity to free margins an advancement-rotation flap was deemed most appropriate.  Using a sterile surgical marker, an appropriate flap was drawn incorporating the defect and placing the expected incisions within the relaxed skin tension lines where possible. The area thus outlined was incised deep to adipose tissue with a #15 scalpel blade.  The skin margins were undermined to an appropriate distance in all directions utilizing iris scissors. Complex Repair And O-L Flap Text: The defect edges were debeveled with a #15 scalpel blade.  The primary defect was closed partially with a complex linear closure.  Given the location of the remaining defect, shape of the defect and the proximity to free margins an O-L flap was deemed most appropriate for complete closure of the defect.  Using a sterile surgical marker, an appropriate flap was drawn incorporating the defect and placing the expected incisions within the relaxed skin tension lines where possible.    The area thus outlined was incised deep to adipose tissue with a #15 scalpel blade.  The skin margins were undermined to an appropriate distance in all directions utilizing iris scissors. Interpolation Flap Text: A decision was made to reconstruct the defect utilizing an interpolation axial flap and a staged reconstruction.  A telfa template was made of the defect.  This telfa template was then used to outline the interpolation flap.  The donor area for the pedicle flap was then injected with anesthesia.  The flap was excised through the skin and subcutaneous tissue down to the layer of the underlying musculature.  The interpolation flap was carefully excised within this deep plane to maintain its blood supply.  The edges of the donor site were undermined.   The donor site was closed in a primary fashion.  The pedicle was then rotated into position and sutured.  Once the tube was sutured into place, adequate blood supply was confirmed with blanching and refill.  The pedicle was then wrapped with xeroform gauze and dressed appropriately with a telfa and gauze bandage to ensure continued blood supply and protect the attached pedicle. Saucerization Excision Additional Text (Leave Blank If You Do Not Want): The margin was drawn around the clinically apparent lesion.  Incisions were then made along these lines, in a tangential fashion, to the appropriate tissue plane and the lesion was extirpated. Size Of Lesion In Cm: 0.7 Cheek-To-Nose Interpolation Flap Text: A decision was made to reconstruct the defect utilizing an interpolation axial flap and a staged reconstruction.  A telfa template was made of the defect.  This telfa template was then used to outline the Cheek-To-Nose Interpolation flap.  The donor area for the pedicle flap was then injected with anesthesia.  The flap was excised through the skin and subcutaneous tissue down to the layer of the underlying musculature.  The interpolation flap was carefully excised within this deep plane to maintain its blood supply.  The edges of the donor site were undermined.   The donor site was closed in a primary fashion.  The pedicle was then rotated into position and sutured.  Once the tube was sutured into place, adequate blood supply was confirmed with blanching and refill.  The pedicle was then wrapped with xeroform gauze and dressed appropriately with a telfa and gauze bandage to ensure continued blood supply and protect the attached pedicle.

## 2024-07-23 NOTE — CONSULTS
[facility] - Surgery  Physical Medicine & Rehab  Consult Note    Patient Name: Cortes Schroeder  MRN: [id number]  Admission Date: 7/18/2024  Hospital Length of Stay: 3 days  Attending Physician: Maria Del Rosario Medina MD     Inpatient consult to Physical Medicine & Rehabilitation  Consult performed by: Aubrie Hurt NP  Consult requested by:  Maria Del Rosario Medina MD    Collaborating Physician: Imelda Veloz MD  Reason for Consult:  Assess rehabilitation needs      Consults  Subjective:     Principal Problem: Closed trimalleolar fracture of right ankle    HPI: Cortes Schroeder is a 64-year-old male with PMHx of type 2 diabetes on insulin pump, hypertension, morbid obesity, chronic pain syndrome, and multiple falls. Patient presented to [facility] on 7/18/24 for a fall. In the ED, he was found to have a right ankle fracture and a distal left radius fracture. S/p Right ankle ex-fix 07/18 and left distal radius ORIF 7/19/24. Ortho recommending NWB RLE and LUE weightbearing at the elbow, full ROM LUE. Per HM syncope appears is a combination of low Na/DEEPALI volme depletion and hypotension. Cardiac work up thus far negative.     Functional History: Patient lives in a [facility] with 3 steps to enter. Prior to admission, I. DME: none. Also, has a [place] multilevel home with a roommate.     Hospital Course:   7/20/24: Participated w/ PT & OT. Bed mob SBA- Mateo x 2 ppl. Sit tot stand maxA. With L platform RW.      Past Medical History:   Diagnosis Date    Anxiety 12/18/2012    Back pain 12/18/2012    Cataract     Chronic pain syndrome 4/24/2016    Diabetes type 2, controlled 2/20/2016    Diabetic retinopathy     DM (diabetes mellitus) 12/18/2012    DM (diabetes mellitus), type 2, uncontrolled 11/16/2013    Essential hypertension 2/20/2016    Gastroesophageal reflux disease without esophagitis 2/20/2016    Hyperlipidemia 12/18/2012    Insomnia 8/7/2014    Neuropathy 11/16/2013     Past Surgical History:   Procedure Laterality Date     APPLICATION, EXTERNAL FIXATION DEVICE, FOR ANKLE FRACTURE Right 7/18/2024    Procedure: APPLICATION, EXTERNAL FIXATION DEVICE, FOR ANKLE FRACTURE;  Surgeon: Moe Liz MD;  Location: [facility] OR [address] [place] [address];  Service: Orthopedics;  Laterality: Right;    BACK SURGERY  2003    Lumbar Spine    CATARACT EXTRACTION      CLOSED REDUCTION, FRACTURE, ANKLE, TRIMALLEOLAR Right 7/18/2024    Procedure: CLOSED REDUCTION, FRACTURE, ANKLE, TRIMALLEOLAR;  Surgeon: Moe Liz MD;  Location: [address];  Service: Orthopedics;  Laterality: Right;    EPIDURAL STEROID INJECTION N/A 1/16/2019    Procedure: Injection, Steroid, Epidural Cervical;  Surgeon: Andrew Sinclair Jr., MD;  Location: [facility] ENDO;  Service: Pain Management;  Laterality: N/A;  Cervical Epidural Steroid Injection C7-T1    [id number]    Arrive @ 1240    EPIDURAL STEROID INJECTION N/A 2/20/2019    Procedure: Injection, Steroid, Epidural;  Surgeon: Andrew Sinclair Jr., MD;  Location: [facility] ENDO;  Service: Pain Management;  Laterality: N/A;  Lumbar Epidural Steroid Injection L4-5    21849    Arrive @ 1215 (requests latest time)    INJECTION, SPINE, LUMBOSACRAL, TRANSFORAMINAL APPROACH Bilateral 6/14/2024    Procedure: Bilateral L5 Transforaminal Epidural Steroid Injections;  Surgeon: Andrew Sinclair Jr., MD;  Location: [facility] PAIN MANAGEMENT;  Service: Pain Management;  Laterality: Bilateral;  @1300(given)  ASA last 6/8  Check BG  MD Sign.    OPEN REDUCTION AND INTERNAL FIXATION (ORIF) OF FRACTURE OF DISTAL RADIUS Left 7/19/2024    Procedure: ORIF, FRACTURE, RADIUS, DISTAL - LEFT;  Surgeon: Moe Liz MD;  Location: [address];  Service: Orthopedics;  Laterality: Left;  LEFT, SYNTHES, C ARM     Review of patient's allergies indicates:   Allergen Reactions    Invokana [canagliflozin] Anaphylaxis    Percocet [oxycodone-acetaminophen] Nausea Only and Hallucinations    Biaxin [clarithromycin]     Hydrocodone Other (See Comments)      Dizzy/nausea/hallucinations    Sulfa (sulfonamide antibiotics) Nausea Only and Rash       Scheduled Medications:    acetaminophen  650 mg Oral Q6H    aspirin  81 mg Oral BID    atorvastatin  40 mg Oral Daily    celecoxib  200 mg Oral Daily    cyanocobalamin  1,000 mcg Oral Daily    DULoxetine  60 mg Oral Daily    emtricitabine-tenofovir 200-300 mg  1 tablet Oral Daily    gabapentin  600 mg Oral Daily    And    gabapentin  1,200 mg Oral QHS    magnesium oxide  200 mg Oral Daily    nortriptyline  50 mg Oral Nightly    omega 3-dha-epa-fish oil  1 capsule Oral BID    polyethylene glycol  17 g Oral Daily    senna-docusate 8.6-50 mg  1 tablet Oral BID    vitamin D  1,000 Units Oral BID       PRN Medications:   Current Facility-Administered Medications:     acetaminophen, 650 mg, Oral, Q4H PRN    aluminum-magnesium hydroxide-simethicone, 30 mL, Oral, QID PRN    bisacodyL, 10 mg, Rectal, Daily PRN    dextrose 10%, 12.5 g, Intravenous, PRN    dextrose 10%, 12.5 g, Intravenous, PRN    dextrose 10%, 25 g, Intravenous, PRN    dextrose 10%, 25 g, Intravenous, PRN    glucagon (human recombinant), 1 mg, Intramuscular, PRN    glucose, 16 g, Oral, PRN    glucose, 24 g, Oral, PRN    hydrALAZINE, 25 mg, Oral, Q8H PRN    insulin lispro, 1-20 Units, Subcutaneous, PRN    melatonin, 6 mg, Oral, Nightly PRN    methocarbamoL, 500 mg, Oral, QID PRN    morphine, 15 mg, Oral, Q4H PRN    naloxone, 0.02 mg, Intravenous, PRN    ondansetron, 4 mg, Intravenous, Q8H PRN    prochlorperazine, 5 mg, Intravenous, Q6H PRN    sodium chloride 0.9%, 10 mL, Intravenous, Q6H PRN    Family History       Problem Relation (Age of Onset)    Alzheimer's disease Father    Cataracts Father    Heart attack Mother    Hypertension Father, Mother    Migraines Mother    Parkinsonism Father          Tobacco Use    Smoking status: Never    Smokeless tobacco: Never   Substance and Sexual Activity    Alcohol use: Yes     Alcohol/week: 1.0 standard drink of alcohol     Types:  1 Glasses of wine per week     Comment: occasionally    Drug use: No    Sexual activity: Not Currently     Partners: Male     Birth control/protection: Condom     Comment: 10/2/17      Review of Systems   Constitutional:  Positive for activity change. Negative for fatigue and fever.   HENT:  Negative for trouble swallowing and voice change.    Respiratory:  Negative for cough and shortness of breath.    Cardiovascular:  Negative for chest pain and leg swelling.   Gastrointestinal:  Negative for abdominal distention and abdominal pain.   Genitourinary:  Negative for flank pain.   Musculoskeletal:  Positive for gait problem. Negative for back pain.   Skin:  Negative for color change and rash.   Neurological:  Positive for weakness. Negative for speech difficulty and numbness.   Psychiatric/Behavioral:  Negative for confusion.      Objective:     Vital Signs (Most Recent):  Temp: 97.8 °F (36.6 °C) (07/23/24 0442)  Pulse: 88 (07/23/24 0442)  Resp: 18 (07/23/24 0442)  BP: (!) 164/80 (07/23/24 0442)  SpO2: 97 % (07/23/24 0442)    Vital Signs (24h Range):  Temp:  [97.7 °F (36.5 °C)-98 °F (36.7 °C)] 97.8 °F (36.6 °C)  Pulse:  [83-98] 88  Resp:  [17-18] 18  SpO2:  [93 %-97 %] 97 %  BP: (146-173)/(71-85) 164/80     Body mass index is 33.78 kg/m².     Physical Exam  Vitals and nursing note reviewed.   Constitutional:       Appearance: He is well-developed.   HENT:      Head: Normocephalic and atraumatic.   Eyes:      General:         Right eye: No discharge.         Left eye: No discharge.      Pupils: Pupils are equal, round, and reactive to light.   Pulmonary:      Effort: Pulmonary effort is normal. No respiratory distress.   Abdominal:      General: There is no distension.      Palpations: Abdomen is soft.      Tenderness: There is no abdominal tenderness.   Musculoskeletal:         General: No deformity.      Cervical back: Neck supple.      Comments: RLE with ex fix in place, Pin sites covered in gauze  LUE with  sling in place   Skin:     General: Skin is warm and dry.   Neurological:      Mental Status: He is alert and oriented to person, place, and time. Mental status is at baseline.      Sensory: No sensory deficit.      Motor: Weakness present. No abnormal muscle tone.      Gait: Gait abnormal.   Psychiatric:         Mood and Affect: Mood normal.         Behavior: Behavior normal.     Diagnostic Results:   Labs: Reviewed  ECG: Reviewed  X-Ray: Reviewed    Assessment/Plan:     * Closed trimalleolar fracture of right ankle  - S/p Right ankle ex-fix 07/18   - Ortho recommending NWB RLE  - swelling improved per Ortho    Syncope  - Per HM syncope appears is a combination of low Na/DEEPALI volme depletion and hypotension.   - Cardiac work up thus far negative.     Multiple falls  -  Encourage mobility, OOB in chair at least 3 hours per day, and early ambulation as appropriate  -  PT/OT evaluate and treat  -  Reviewed discharge options (IP rehab)     Closed fracture of left distal radius  - S/p left distal radius ORIF 7/19/24.   - Ortho recommending LUE weightbearing at the elbow, full ROM LUE.     PM&R Recommendation:     At this time, the PM&R team has reviewed this patient's ongoing medical case including inpatient diagnosis, medical history, clinical examination, labs, vitals, current social and functional history to provide the post-acute recommendation as follows:     RECOMMENDATIONS: inpatient rehabilitation due to good motivation/participation with therapies, has been determined to tolerate 3 hours of therapy and good potential for recovery.     The patient will be admitted for comprehensive interdisciplinary inpatient rehabilitation to address the impairments due to medical diagnosis of R ankle fracture and L distal radius fracture. The patient will benefit from an inpatient rehabilitation program to promote functional recovery, implement compensatory strategies and will undergo assessment for needs for durable medical  equipment for safe discharge to the community. This patient will benefit from a coordinated interdisciplinary rehabilitation program services that require close monitoring and treatment with 24-hour rehabilitative nursing and physical/occupational therapies for 3 hours/day for 5 days/week.This interdisciplinary program will be performed under the direction of a physiatrist.    MEDICAL STABILITY:     At this time, no barriers for post-acute rehab admission.    We will continue to follow.     Thank you for your consult.     Aubrie Hurt NP  Department of Physical Medicine & Rehab  [facility]

## 2024-07-25 ENCOUNTER — TELEPHONE (OUTPATIENT)
Dept: ORTHOPEDICS | Facility: CLINIC | Age: 64
End: 2024-07-25

## 2024-07-25 ENCOUNTER — OFFICE VISIT (OUTPATIENT)
Dept: ORTHOPEDICS | Facility: CLINIC | Age: 64
End: 2024-07-25
Payer: COMMERCIAL

## 2024-07-25 ENCOUNTER — ANESTHESIA EVENT (OUTPATIENT)
Dept: SURGERY | Facility: HOSPITAL | Age: 64
End: 2024-07-25
Payer: COMMERCIAL

## 2024-07-25 VITALS — HEART RATE: 73 BPM | SYSTOLIC BLOOD PRESSURE: 108 MMHG | DIASTOLIC BLOOD PRESSURE: 65 MMHG | RESPIRATION RATE: 18 BRPM

## 2024-07-25 DIAGNOSIS — S82.851A CLOSED TRIMALLEOLAR FRACTURE OF RIGHT ANKLE, INITIAL ENCOUNTER: Primary | ICD-10-CM

## 2024-07-25 PROCEDURE — 1159F MED LIST DOCD IN RCRD: CPT | Mod: CPTII,S$GLB,, | Performed by: PHYSICIAN ASSISTANT

## 2024-07-25 PROCEDURE — 4010F ACE/ARB THERAPY RXD/TAKEN: CPT | Mod: CPTII,S$GLB,, | Performed by: PHYSICIAN ASSISTANT

## 2024-07-25 PROCEDURE — 3078F DIAST BP <80 MM HG: CPT | Mod: CPTII,S$GLB,, | Performed by: PHYSICIAN ASSISTANT

## 2024-07-25 PROCEDURE — 3074F SYST BP LT 130 MM HG: CPT | Mod: CPTII,S$GLB,, | Performed by: PHYSICIAN ASSISTANT

## 2024-07-25 PROCEDURE — 99999 PR PBB SHADOW E&M-EST. PATIENT-LVL IV: CPT | Mod: PBBFAC,,, | Performed by: PHYSICIAN ASSISTANT

## 2024-07-25 PROCEDURE — 99024 POSTOP FOLLOW-UP VISIT: CPT | Mod: S$GLB,,, | Performed by: PHYSICIAN ASSISTANT

## 2024-07-25 PROCEDURE — 3046F HEMOGLOBIN A1C LEVEL >9.0%: CPT | Mod: CPTII,S$GLB,, | Performed by: PHYSICIAN ASSISTANT

## 2024-07-25 RX ORDER — ENOXAPARIN SODIUM 100 MG/ML
40 INJECTION SUBCUTANEOUS DAILY
COMMUNITY

## 2024-07-25 RX ORDER — ATORVASTATIN CALCIUM 40 MG/1
40 TABLET, FILM COATED ORAL DAILY
COMMUNITY

## 2024-07-25 NOTE — TELEPHONE ENCOUNTER
Alisa spoke with Aubrie at rehab    Gave Aubrie surgery arrival time   Surgery instructions by phone and sent via email.  Pt received PT IQ completed

## 2024-07-25 NOTE — PRE-PROCEDURE INSTRUCTIONS
[facility] Instructions given: [phone]  - Verbal medication information (what to hold and what to take)   - NPO guidelines 2400  - Arrival place directions given; time to be given the day before procedure by the   Surgeon's Office 0600 DOSC  - Bathing with antibacterial soap   - Don't wear any jewelry or bring any valuables AM of surgery   - No makeup or moisturizer to face   - No perfume/cologne, powder, lotions or aftershave   Pt. verbalized understanding.   Pt denies any h/o Anesthesia/Sedation complications or side effects.  [phone]  Lee will transport pt/WC

## 2024-07-25 NOTE — H&P
"Subjective:      Patient ID: Cortes Schroeder is a 64 y.o. male.    Chief Complaint: No chief complaint on file.    Principal Orthopedic Problem:   65 yo M, ground level fall 07/18/2  Right trimalleolar ankle fracture   07/18/24 (Agusto) external fixation ankle trimal, right    left intra-articular distal radius fracture    Reduced and splinted   07/19/24 (Agusto) ORIF distal radius, right     Relevant Medical History: according to chart    PMHX: DM, neuropathy of foot     Lives at home with himself, he is here in a condo on contract,   Occupation:  with [employer]   Prior to injury  Independent community ambulator, gait aids   Denies history of stroke, heart attack, cancer, blood clot,   +diabetes A1C 9.9  Denies tobacco use  Denied chronic pain medication use prior to injury  Walks w/out assisted devices at baseline.     Estimated body mass index is 33.78 kg/m² as calculated from the following:    Height as of 7/20/24: 5' 5" (1.651 m).    Weight as of 7/20/24: 92.1 kg (203 lb).    Past Medical History:   Diagnosis Date    Anxiety 12/18/2012    Back pain 12/18/2012    Cataract     Chronic pain syndrome 4/24/2016    Diabetes type 2, controlled 2/20/2016    Diabetic retinopathy     DM (diabetes mellitus) 12/18/2012    DM (diabetes mellitus), type 2, uncontrolled 11/16/2013    Essential hypertension 2/20/2016    Gastroesophageal reflux disease without esophagitis 2/20/2016    Hyperlipidemia 12/18/2012    Insomnia 8/7/2014    Neuropathy 11/16/2013     Past Surgical History:   Procedure Laterality Date    APPLICATION, EXTERNAL FIXATION DEVICE, FOR ANKLE FRACTURE Right 7/18/2024    Procedure: APPLICATION, EXTERNAL FIXATION DEVICE, FOR ANKLE FRACTURE;  Surgeon: Moe Liz MD;  Location: [facility] OR [address] [place] [address];  Service: Orthopedics;  Laterality: Right;    BACK SURGERY  2003    Lumbar Spine    CATARACT EXTRACTION      CLOSED REDUCTION, FRACTURE, ANKLE, TRIMALLEOLAR Right 7/18/2024    Procedure: CLOSED " REDUCTION, FRACTURE, ANKLE, TRIMALLEOLAR;  Surgeon: Moe Liz MD;  [facility];  Service: Orthopedics;  Laterality: Right;    EPIDURAL STEROID INJECTION N/A 1/16/2019    Procedure: Injection, Steroid, Epidural Cervical;  Surgeon: Andrew Sinclair Jr., MD;  Location: [facility] ENDO;  Service: Pain Management;  Laterality: N/A;  Cervical Epidural Steroid Injection C7-T1    77448    Arrive @ 1240    EPIDURAL STEROID INJECTION N/A 2/20/2019    Procedure: Injection, Steroid, Epidural;  Surgeon: Andrew Sinclair Jr., MD;  Location: [facility] ENDO;  Service: Pain Management;  Laterality: N/A;  Lumbar Epidural Steroid Injection L4-5    59680    Arrive @ 1215 (requests latest time)    INJECTION, SPINE, LUMBOSACRAL, TRANSFORAMINAL APPROACH Bilateral 6/14/2024    Procedure: Bilateral L5 Transforaminal Epidural Steroid Injections;  Surgeon: Andrew Sinclair Jr., MD;  Location: [facility] PAIN MANAGEMENT;  Service: Pain Management;  Laterality: Bilateral;  @1300(given)  ASA last 6/8  Check BG  MD Sign.    OPEN REDUCTION AND INTERNAL FIXATION (ORIF) OF FRACTURE OF DISTAL RADIUS Left 7/19/2024    Procedure: ORIF, FRACTURE, RADIUS, DISTAL - LEFT;  Surgeon: Moe Liz MD;  [facility];  Service: Orthopedics;  Laterality: Left;  LEFT, SYNTHES, C ARM     Social History     Occupational History    Not on file   Tobacco Use    Smoking status: Never    Smokeless tobacco: Never   Substance and Sexual Activity    Alcohol use: Yes     Alcohol/week: 1.0 standard drink of alcohol     Types: 1 Glasses of wine per week     Comment: occasionally    Drug use: No    Sexual activity: Not Currently     Partners: Male     Birth control/protection: Condom     Comment: 10/2/17       ROS  Current Outpatient Medications on File Prior to Visit   Medication Sig Dispense Refill    acetaminophen (TYLENOL) 325 MG tablet Take 2 tablets (650 mg total) by mouth every 6 (six) hours.      aspirin (ECOTRIN) 81 MG EC  tablet Take 1 tablet (81 mg total) by mouth 2 (two) times a day. End date sept 20, 2024      celecoxib (CELEBREX) 200 MG capsule Take 1 capsule (200 mg total) by mouth once daily.      cyanocobalamin (VITAMIN B-12) 1000 MCG tablet Take 1,000 mcg by mouth once daily.      DULoxetine (CYMBALTA) 60 MG capsule Take 1 capsule (60 mg total) by mouth once daily. 90 capsule 0    emtricitabine-tenofovir 200-300 mg (TRUVADA) 200-300 mg Tab Take 1 tablet by mouth once daily. 30 tablet 0    fish oil-omega-3 fatty acids 300-1,000 mg capsule Take 1 capsule by mouth 2 (two) times daily.      gabapentin (NEURONTIN) 300 MG capsule Take 2 capsules (600 mg total) by mouth once daily AND 4 capsules (1,200 mg total) every evening.      HUMALOG U-100 INSULIN 100 unit/mL injection 1:20 U PRN high blood sugar and snacks. Correction dose- Enter carb coverage intake upon administration  Target number 120 Carbohydrate coverage #1 1:2 Carbohydrate coverage #1 time 3330-5499 Carbohydrate coverage #2 1:3 Carbohydrate coverage #2 time 8962-6152 Carbohydrate coverage #3 Carbohydrate coverage #3 time Carbohydrate coverage #4 Carbohydrate coverage #4 time Sensitivity #1 1:20 Sensitivity #1 time 8410-8374 Sensitivity #2 Sensitivity #2 time Sensitivity #3 Sensitivity #3 time Sensitivity #4 Sensitivity #4 time Sensitivity #5 Sensitivity #5 time Order Questions Question Answer Comment       50 U SQ continuous  Target number 120 Basal Rate #1 2.3 Basal rate #1 time 2838-3373 Basal Rate #2 1.55 Basal rate #2 time 7592-6647 Basal rate #3 Basal rate #3 time Basal rate #4 Basal rate #4 time Basal rate #5 Basal rate #5 time      losartan (COZAAR) 25 MG tablet Take 1 tablet (25 mg total) by mouth once daily.      magnesium oxide (MAG-OX) 400 mg (241.3 mg magnesium) tablet Take 0.5 tablets (200 mg total) by mouth once daily.      melatonin (MELATIN) 3 mg tablet Take 2 tablets (6 mg total) by mouth nightly as needed for Insomnia.      methocarbamoL (ROBAXIN) 500  MG Tab Take 1 tablet (500 mg total) by mouth 4 (four) times daily as needed (pain scale 4-7).      morphine (MSIR) 15 MG tablet Take 1 tablet (15 mg total) by mouth every 4 (four) hours as needed (pain scale 6-10).      MOUNJARO 10 mg/0.5 mL PnIj Inject 10 mg into the skin once a week. HOLD until all surgeries completed and out of rehab      nortriptyline (PAMELOR) 50 MG capsule Take 1 capsule (50 mg total) by mouth nightly. 90 capsule 0    ondansetron (ZOFRAN-ODT) 4 MG TbDL Take 2 tablets (8 mg total) by mouth every 6 (six) hours as needed (nausea).      polyethylene glycol (GLYCOLAX) 17 gram PwPk Take 17 g by mouth once daily.      rosuvastatin (CRESTOR) 10 MG tablet Take 10 mg by mouth every evening.      senna-docusate 8.6-50 mg (PERICOLACE) 8.6-50 mg per tablet Take 1 tablet by mouth 2 (two) times daily.      vitamin D 1000 units Tab Take 1,000 Units by mouth 2 (two) times daily.      blood-glucose sensor (DEXCOM G6 SENSOR) Omaira Change sensor every 10 days 3 each PRN    blood-glucose transmitter (DEXCOM G6 TRANSMITTER) Omaira Change transmitter every 3 months 1 each PRN    [DISCONTINUED] insulin aspart U-100 (NOVOLOG U-100 INSULIN ASPART) 100 unit/mL injection Use in insulin pump. Max daily dose 150 units. 50 mL 5     No current facility-administered medications on file prior to visit.     Review of patient's allergies indicates:   Allergen Reactions    Invokana [canagliflozin] Anaphylaxis    Percocet [oxycodone-acetaminophen] Nausea Only and Hallucinations    Biaxin [clarithromycin]     Hydrocodone Other (See Comments)     Dizzy/nausea/hallucinations    Sulfa (sulfonamide antibiotics) Nausea Only and Rash         Objective:      Vitals:    07/25/24 1114   BP: 108/65   Pulse: 73   Resp: 18     Ortho Exam     Gen: WD, WN in NAD   HEENT: NC/AT   Heart: RR   Resp: unlabored breathing   Skin: intact, no lesions pertinent to the surgery site    Assessment:       1. Closed trimalleolar fracture of right ankle, initial  encounter          Plan:       Surgical intervention per .

## 2024-07-25 NOTE — PROGRESS NOTES
"Principal Orthopedic Problem:   65 yo M, ground level fall 07/18/2  Right trimalleolar ankle fracture   07/18/24 (Agusto) external fixation ankle trimal, right    left intra-articular distal radius fracture    Reduced and splinted   07/19/24 (Agusto) ORIF distal radius, right     Relevant Medical History: according to chart    PMHX: DM, neuropathy of foot     Lives at home with himself, he is here in a condo on contract,   Occupation:  with [employer]   Prior to injury  Independent community ambulator, gait aids   Denies history of stroke, heart attack, cancer, blood clot,   +diabetes A1C 9.9  Denies tobacco use  Denied chronic pain medication use prior to injury  Walks w/out assisted devices at baseline.     Estimated body mass index is 33.78 kg/m² as calculated from the following:    Height as of 7/20/24: 5' 5" (1.651 m).    Weight as of 7/20/24: 92.1 kg (203 lb).      Mr. Schroeder is here today for a post-operative/ pre operative visit    Interval History:  he reports that he is doing ok.   he is at [facility]. he is  participating in PT/OT.   Pain is controlled.  he is  taking pain medication.    Has elevated and iced.   he denies fever, chills, and sweats .     Physical exam:    Patient arrives to exam room: wheelchair.  Patient is  in accompanied    Pin sites is clean, dry and intact. Skin does wrinkle,.     RADS: All pertinent images were reviewed by myself:   none done today    Assessment:  Post-op visit ( 1 weeks)    Plan:  WRIST: right distal radius   Keep dressing in place, do not get wet.    NWB, ok to use platform walker   Return to clinic in 1 week for possible suture removal     OTHER: tight gulcose  control     ANKLE: right trimal   Discussed treatment options with patient. Operative vs non-operative treatment discussed with patient. Recommend operative intervention. Patient agreed.  PLAN: ORIF with removal of external fixation device, ankle right    Will present to DOS NPO at 6 " am, will return to rehab after procedure     - rest ice and elevation to reduce swelling.    Discussed proper and consistent elevation of extremities, above the level of the heart, while at rest, to help control/improve edema and decrease pain.  - NWB,   - Pain medication: refill needed, no at Rehab    - Discussed pain medication refill policy.      - consents signed, previously   - lab, chest x-ray and EKG ordered  previously , results in chart  - is taking blood thinners , Aspirin     -Discussed COVID19 with the patient, they are aware of our current policies and procedures, were given the option of delaying surgery, and they elect to proceed.      Pre, ashley, and post operative procedure and expectations were discussed.  Questions were answered. The patient has been educated and is ready to proceed with surgery.  Approximately 30 minutes was spent discussing surgical outcomes, plans, procedures, pre, ashley, and post operative expectations and care. The risks, benefits and alternatives to surgery were discussed with the patient at great length.  These include bleeding, infection, vessel/nerve damage, pain, numbness, tingling, complex regional pain syndrome, hardware/surgical failure, need for further surgery, malunion, nonunion, DVT, PE, arthritis and death. He also understands that the risks of surgery may be greater for some patients due to health or lifestyle issues, such as a current condition or a history of heart disease, obesity, clotting disorders, recurrent infections, smoking, sedentary lifestyle, or noncompliance with medications, therapy, or followup. The degree of the increased risk is hard to estimate with any degree of precision.  Patient states an understanding and wishes to proceed with surgery.   All questions were answered.  No guarantees were implied or stated.  Informed consent was obtained  The patient will contact us if the have any questions, concerns, and changes in their medical condition  prior to surgery.

## 2024-07-25 NOTE — H&P (VIEW-ONLY)
"Subjective:      Patient ID: Cortes Schroeder is a 64 y.o. male.    Chief Complaint: No chief complaint on file.    Principal Orthopedic Problem:   65 yo M, ground level fall 07/18/2  Right trimalleolar ankle fracture   07/18/24 (Agusto) external fixation ankle trimal, right    left intra-articular distal radius fracture    Reduced and splinted   07/19/24 (Agusto) ORIF distal radius, right     Relevant Medical History: according to chart    PMHX: DM, neuropathy of foot     Lives at home with himself, he is here in a condo on contract,   Occupation:  with [employer]   Prior to injury  Independent community ambulator, gait aids   Denies history of stroke, heart attack, cancer, blood clot,   +diabetes A1C 9.9  Denies tobacco use  Denied chronic pain medication use prior to injury  Walks w/out assisted devices at baseline.     Estimated body mass index is 33.78 kg/m² as calculated from the following:    Height as of 7/20/24: 5' 5" (1.651 m).    Weight as of 7/20/24: 92.1 kg (203 lb).    Past Medical History:   Diagnosis Date    Anxiety 12/18/2012    Back pain 12/18/2012    Cataract     Chronic pain syndrome 4/24/2016    Diabetes type 2, controlled 2/20/2016    Diabetic retinopathy     DM (diabetes mellitus) 12/18/2012    DM (diabetes mellitus), type 2, uncontrolled 11/16/2013    Essential hypertension 2/20/2016    Gastroesophageal reflux disease without esophagitis 2/20/2016    Hyperlipidemia 12/18/2012    Insomnia 8/7/2014    Neuropathy 11/16/2013     Past Surgical History:   Procedure Laterality Date    APPLICATION, EXTERNAL FIXATION DEVICE, FOR ANKLE FRACTURE Right 7/18/2024    Procedure: APPLICATION, EXTERNAL FIXATION DEVICE, FOR ANKLE FRACTURE;  Surgeon: Moe Liz MD;  Location: [facility] OR [address] [place] [address];  Service: Orthopedics;  Laterality: Right;    BACK SURGERY  2003    Lumbar Spine    CATARACT EXTRACTION      CLOSED REDUCTION, FRACTURE, ANKLE, TRIMALLEOLAR Right 7/18/2024    Procedure: CLOSED " REDUCTION, FRACTURE, ANKLE, TRIMALLEOLAR;  Surgeon: Moe Liz MD;  [facility];  Service: Orthopedics;  Laterality: Right;    EPIDURAL STEROID INJECTION N/A 1/16/2019    Procedure: Injection, Steroid, Epidural Cervical;  Surgeon: Andrew Sinclair Jr., MD;  Location: [facility] ENDO;  Service: Pain Management;  Laterality: N/A;  Cervical Epidural Steroid Injection C7-T1    75507    Arrive @ 1240    EPIDURAL STEROID INJECTION N/A 2/20/2019    Procedure: Injection, Steroid, Epidural;  Surgeon: Andrew Sinclair Jr., MD;  Location: [facility] ENDO;  Service: Pain Management;  Laterality: N/A;  Lumbar Epidural Steroid Injection L4-5    19166    Arrive @ 1215 (requests latest time)    INJECTION, SPINE, LUMBOSACRAL, TRANSFORAMINAL APPROACH Bilateral 6/14/2024    Procedure: Bilateral L5 Transforaminal Epidural Steroid Injections;  Surgeon: Andrew Sinclair Jr., MD;  Location: [facility] PAIN MANAGEMENT;  Service: Pain Management;  Laterality: Bilateral;  @1300(given)  ASA last 6/8  Check BG  MD Sign.    OPEN REDUCTION AND INTERNAL FIXATION (ORIF) OF FRACTURE OF DISTAL RADIUS Left 7/19/2024    Procedure: ORIF, FRACTURE, RADIUS, DISTAL - LEFT;  Surgeon: Moe Liz MD;  [facility];  Service: Orthopedics;  Laterality: Left;  LEFT, SYNTHES, C ARM     Social History     Occupational History    Not on file   Tobacco Use    Smoking status: Never    Smokeless tobacco: Never   Substance and Sexual Activity    Alcohol use: Yes     Alcohol/week: 1.0 standard drink of alcohol     Types: 1 Glasses of wine per week     Comment: occasionally    Drug use: No    Sexual activity: Not Currently     Partners: Male     Birth control/protection: Condom     Comment: 10/2/17       ROS  Current Outpatient Medications on File Prior to Visit   Medication Sig Dispense Refill    acetaminophen (TYLENOL) 325 MG tablet Take 2 tablets (650 mg total) by mouth every 6 (six) hours.      aspirin (ECOTRIN) 81 MG EC  tablet Take 1 tablet (81 mg total) by mouth 2 (two) times a day. End date sept 20, 2024      celecoxib (CELEBREX) 200 MG capsule Take 1 capsule (200 mg total) by mouth once daily.      cyanocobalamin (VITAMIN B-12) 1000 MCG tablet Take 1,000 mcg by mouth once daily.      DULoxetine (CYMBALTA) 60 MG capsule Take 1 capsule (60 mg total) by mouth once daily. 90 capsule 0    emtricitabine-tenofovir 200-300 mg (TRUVADA) 200-300 mg Tab Take 1 tablet by mouth once daily. 30 tablet 0    fish oil-omega-3 fatty acids 300-1,000 mg capsule Take 1 capsule by mouth 2 (two) times daily.      gabapentin (NEURONTIN) 300 MG capsule Take 2 capsules (600 mg total) by mouth once daily AND 4 capsules (1,200 mg total) every evening.      HUMALOG U-100 INSULIN 100 unit/mL injection 1:20 U PRN high blood sugar and snacks. Correction dose- Enter carb coverage intake upon administration  Target number 120 Carbohydrate coverage #1 1:2 Carbohydrate coverage #1 time [id number] Carbohydrate coverage #2 1:3 Carbohydrate coverage #2 time 8081-5486 Carbohydrate coverage #3 Carbohydrate coverage #3 time Carbohydrate coverage #4 Carbohydrate coverage #4 time Sensitivity #1 1:20 Sensitivity #1 time 1528-6705 Sensitivity #2 Sensitivity #2 time Sensitivity #3 Sensitivity #3 time Sensitivity #4 Sensitivity #4 time Sensitivity #5 Sensitivity #5 time Order Questions Question Answer Comment       50 U SQ continuous  Target number 120 Basal Rate #1 2.3 Basal rate #1 time 8781-5323 Basal Rate #2 1.55 Basal rate #2 time 4669-2655 Basal rate #3 Basal rate #3 time Basal rate #4 Basal rate #4 time Basal rate #5 Basal rate #5 time      losartan (COZAAR) 25 MG tablet Take 1 tablet (25 mg total) by mouth once daily.      magnesium oxide (MAG-OX) 400 mg (241.3 mg magnesium) tablet Take 0.5 tablets (200 mg total) by mouth once daily.      melatonin (MELATIN) 3 mg tablet Take 2 tablets (6 mg total) by mouth nightly as needed for Insomnia.      methocarbamoL (ROBAXIN) 500  MG Tab Take 1 tablet (500 mg total) by mouth 4 (four) times daily as needed (pain scale 4-7).      morphine (MSIR) 15 MG tablet Take 1 tablet (15 mg total) by mouth every 4 (four) hours as needed (pain scale 6-10).      MOUNJARO 10 mg/0.5 mL PnIj Inject 10 mg into the skin once a week. HOLD until all surgeries completed and out of rehab      nortriptyline (PAMELOR) 50 MG capsule Take 1 capsule (50 mg total) by mouth nightly. 90 capsule 0    ondansetron (ZOFRAN-ODT) 4 MG TbDL Take 2 tablets (8 mg total) by mouth every 6 (six) hours as needed (nausea).      polyethylene glycol (GLYCOLAX) 17 gram PwPk Take 17 g by mouth once daily.      rosuvastatin (CRESTOR) 10 MG tablet Take 10 mg by mouth every evening.      senna-docusate 8.6-50 mg (PERICOLACE) 8.6-50 mg per tablet Take 1 tablet by mouth 2 (two) times daily.      vitamin D 1000 units Tab Take 1,000 Units by mouth 2 (two) times daily.      blood-glucose sensor (DEXCOM G6 SENSOR) Omaira Change sensor every 10 days 3 each PRN    blood-glucose transmitter (DEXCOM G6 TRANSMITTER) Omaira Change transmitter every 3 months 1 each PRN    [DISCONTINUED] insulin aspart U-100 (NOVOLOG U-100 INSULIN ASPART) 100 unit/mL injection Use in insulin pump. Max daily dose 150 units. 50 mL 5     No current facility-administered medications on file prior to visit.     Review of patient's allergies indicates:   Allergen Reactions    Invokana [canagliflozin] Anaphylaxis    Percocet [oxycodone-acetaminophen] Nausea Only and Hallucinations    Biaxin [clarithromycin]     Hydrocodone Other (See Comments)     Dizzy/nausea/hallucinations    Sulfa (sulfonamide antibiotics) Nausea Only and Rash         Objective:      Vitals:    07/25/24 1114   BP: 108/65   Pulse: 73   Resp: 18     Ortho Exam     Gen: WD, WN in NAD   HEENT: NC/AT   Heart: RR   Resp: unlabored breathing   Skin: intact, no lesions pertinent to the surgery site    Assessment:       1. Closed trimalleolar fracture of right ankle, initial  encounter          Plan:       Surgical intervention per .

## 2024-07-26 ENCOUNTER — HOSPITAL ENCOUNTER (OUTPATIENT)
Facility: HOSPITAL | Age: 64
Discharge: NURSING, REHAB OR LONG-TERM CARE | End: 2024-07-26
Attending: ORTHOPAEDIC SURGERY | Admitting: ORTHOPAEDIC SURGERY
Payer: COMMERCIAL

## 2024-07-26 ENCOUNTER — ANESTHESIA (OUTPATIENT)
Dept: SURGERY | Facility: HOSPITAL | Age: 64
End: 2024-07-26
Payer: COMMERCIAL

## 2024-07-26 VITALS
HEIGHT: 65 IN | DIASTOLIC BLOOD PRESSURE: 63 MMHG | SYSTOLIC BLOOD PRESSURE: 123 MMHG | HEART RATE: 84 BPM | OXYGEN SATURATION: 95 % | WEIGHT: 205 LBS | RESPIRATION RATE: 20 BRPM | TEMPERATURE: 98 F | BODY MASS INDEX: 34.16 KG/M2

## 2024-07-26 DIAGNOSIS — S82.851D CLOSED TRIMALLEOLAR FRACTURE OF RIGHT ANKLE WITH ROUTINE HEALING, SUBSEQUENT ENCOUNTER: ICD-10-CM

## 2024-07-26 DIAGNOSIS — S82.891A CLOSED RIGHT ANKLE FRACTURE: Primary | ICD-10-CM

## 2024-07-26 LAB — POCT GLUCOSE: 155 MG/DL (ref 70–110)

## 2024-07-26 PROCEDURE — 64450 NJX AA&/STRD OTHER PN/BRANCH: CPT | Mod: 59,RT,, | Performed by: ANESTHESIOLOGY

## 2024-07-26 PROCEDURE — 63600175 PHARM REV CODE 636 W HCPCS

## 2024-07-26 PROCEDURE — 27823 TREATMENT OF ANKLE FRACTURE: CPT | Mod: 58,RT,, | Performed by: ORTHOPAEDIC SURGERY

## 2024-07-26 PROCEDURE — 63600175 PHARM REV CODE 636 W HCPCS: Mod: JZ,JG | Performed by: ANESTHESIOLOGY

## 2024-07-26 PROCEDURE — 25000003 PHARM REV CODE 250

## 2024-07-26 PROCEDURE — 63600175 PHARM REV CODE 636 W HCPCS: Performed by: NURSE ANESTHETIST, CERTIFIED REGISTERED

## 2024-07-26 PROCEDURE — D9220A PRA ANESTHESIA: Mod: ANES,,, | Performed by: ANESTHESIOLOGY

## 2024-07-26 PROCEDURE — 36000709 HC OR TIME LEV III EA ADD 15 MIN: Performed by: ORTHOPAEDIC SURGERY

## 2024-07-26 PROCEDURE — C1769 GUIDE WIRE: HCPCS | Performed by: ORTHOPAEDIC SURGERY

## 2024-07-26 PROCEDURE — 64708 REVISE ARM/LEG NERVE: CPT | Mod: 58,51,RT, | Performed by: ORTHOPAEDIC SURGERY

## 2024-07-26 PROCEDURE — 76942 ECHO GUIDE FOR BIOPSY: CPT | Mod: 26,,, | Performed by: ANESTHESIOLOGY

## 2024-07-26 PROCEDURE — 71000044 HC DOSC ROUTINE RECOVERY FIRST HOUR: Performed by: ORTHOPAEDIC SURGERY

## 2024-07-26 PROCEDURE — 71000015 HC POSTOP RECOV 1ST HR: Performed by: ORTHOPAEDIC SURGERY

## 2024-07-26 PROCEDURE — 27201423 OPTIME MED/SURG SUP & DEVICES STERILE SUPPLY: Performed by: ORTHOPAEDIC SURGERY

## 2024-07-26 PROCEDURE — C1713 ANCHOR/SCREW BN/BN,TIS/BN: HCPCS | Performed by: ORTHOPAEDIC SURGERY

## 2024-07-26 PROCEDURE — 27829 TREAT LOWER LEG JOINT: CPT | Mod: 58,51,RT, | Performed by: ORTHOPAEDIC SURGERY

## 2024-07-26 PROCEDURE — 63600175 PHARM REV CODE 636 W HCPCS: Performed by: ORTHOPAEDIC SURGERY

## 2024-07-26 PROCEDURE — 25000003 PHARM REV CODE 250: Performed by: NURSE ANESTHETIST, CERTIFIED REGISTERED

## 2024-07-26 PROCEDURE — 37000009 HC ANESTHESIA EA ADD 15 MINS: Performed by: ORTHOPAEDIC SURGERY

## 2024-07-26 PROCEDURE — 64445 NJX AA&/STRD SCIATIC NRV IMG: CPT

## 2024-07-26 PROCEDURE — 64447 NJX AA&/STRD FEMORAL NRV IMG: CPT

## 2024-07-26 PROCEDURE — 63600175 PHARM REV CODE 636 W HCPCS: Mod: JZ,JG

## 2024-07-26 PROCEDURE — 36000708 HC OR TIME LEV III 1ST 15 MIN: Performed by: ORTHOPAEDIC SURGERY

## 2024-07-26 PROCEDURE — D9220A PRA ANESTHESIA: Mod: CRNA,,, | Performed by: NURSE ANESTHETIST, CERTIFIED REGISTERED

## 2024-07-26 PROCEDURE — 37000008 HC ANESTHESIA 1ST 15 MINUTES: Performed by: ORTHOPAEDIC SURGERY

## 2024-07-26 PROCEDURE — 20694 RMVL EXT FIXJ SYS UNDER ANES: CPT | Mod: 58,51,RT, | Performed by: ORTHOPAEDIC SURGERY

## 2024-07-26 DEVICE — SCREW CANN 4.0MM 26MM: Type: IMPLANTABLE DEVICE | Site: ANKLE | Status: FUNCTIONAL

## 2024-07-26 DEVICE — SCREW 2.7X16MM: Type: IMPLANTABLE DEVICE | Site: ANKLE | Status: FUNCTIONAL

## 2024-07-26 DEVICE — SCREW CRTX LOW PROFILE H 42MM: Type: IMPLANTABLE DEVICE | Site: ANKLE | Status: FUNCTIONAL

## 2024-07-26 DEVICE — PLATE LCP 2.7X3.5X125MM RIGHT: Type: IMPLANTABLE DEVICE | Site: ANKLE | Status: FUNCTIONAL

## 2024-07-26 DEVICE — SCREW 2.7X14MM: Type: IMPLANTABLE DEVICE | Site: ANKLE | Status: FUNCTIONAL

## 2024-07-26 DEVICE — SCREW STRDRV REC T8 2.7X12 SS: Type: IMPLANTABLE DEVICE | Site: ANKLE | Status: FUNCTIONAL

## 2024-07-26 DEVICE — SCREW CRTX LOW PROFILE H 46MM: Type: IMPLANTABLE DEVICE | Site: ANKLE | Status: FUNCTIONAL

## 2024-07-26 DEVICE — IMPLANTABLE DEVICE: Type: IMPLANTABLE DEVICE | Site: ANKLE | Status: FUNCTIONAL

## 2024-07-26 DEVICE — SCREW CRTX LOW PROFILE H 70MM: Type: IMPLANTABLE DEVICE | Site: ANKLE | Status: FUNCTIONAL

## 2024-07-26 RX ORDER — FENTANYL CITRATE 50 UG/ML
25-200 INJECTION, SOLUTION INTRAMUSCULAR; INTRAVENOUS
Status: DISCONTINUED | OUTPATIENT
Start: 2024-07-26 | End: 2024-07-26 | Stop reason: HOSPADM

## 2024-07-26 RX ORDER — FENTANYL CITRATE 50 UG/ML
25 INJECTION, SOLUTION INTRAMUSCULAR; INTRAVENOUS EVERY 5 MIN PRN
Status: DISCONTINUED | OUTPATIENT
Start: 2024-07-26 | End: 2024-07-26 | Stop reason: HOSPADM

## 2024-07-26 RX ORDER — BUPIVACAINE HYDROCHLORIDE 5 MG/ML
INJECTION, SOLUTION EPIDURAL; INTRACAUDAL
Status: COMPLETED | OUTPATIENT
Start: 2024-07-26 | End: 2024-07-26

## 2024-07-26 RX ORDER — SODIUM CHLORIDE 0.9 % (FLUSH) 0.9 %
10 SYRINGE (ML) INJECTION
Status: DISCONTINUED | OUTPATIENT
Start: 2024-07-26 | End: 2024-07-26 | Stop reason: HOSPADM

## 2024-07-26 RX ORDER — PROPOFOL 10 MG/ML
VIAL (ML) INTRAVENOUS
Status: DISCONTINUED | OUTPATIENT
Start: 2024-07-26 | End: 2024-07-26

## 2024-07-26 RX ORDER — DEXMEDETOMIDINE HYDROCHLORIDE 100 UG/ML
INJECTION, SOLUTION INTRAVENOUS
Status: DISCONTINUED | OUTPATIENT
Start: 2024-07-26 | End: 2024-07-26

## 2024-07-26 RX ORDER — SODIUM CHLORIDE 9 MG/ML
INJECTION, SOLUTION INTRAVENOUS CONTINUOUS
Status: DISCONTINUED | OUTPATIENT
Start: 2024-07-26 | End: 2024-07-26 | Stop reason: HOSPADM

## 2024-07-26 RX ORDER — VANCOMYCIN HYDROCHLORIDE 1 G/20ML
INJECTION, POWDER, LYOPHILIZED, FOR SOLUTION INTRAVENOUS
Status: DISCONTINUED | OUTPATIENT
Start: 2024-07-26 | End: 2024-07-26 | Stop reason: HOSPADM

## 2024-07-26 RX ORDER — ACETAMINOPHEN 10 MG/ML
INJECTION, SOLUTION INTRAVENOUS
Status: DISCONTINUED | OUTPATIENT
Start: 2024-07-26 | End: 2024-07-26

## 2024-07-26 RX ORDER — LIDOCAINE HYDROCHLORIDE 20 MG/ML
INJECTION INTRAVENOUS
Status: DISCONTINUED | OUTPATIENT
Start: 2024-07-26 | End: 2024-07-26

## 2024-07-26 RX ORDER — CEFAZOLIN SODIUM 1 G/3ML
INJECTION, POWDER, FOR SOLUTION INTRAMUSCULAR; INTRAVENOUS
Status: DISCONTINUED | OUTPATIENT
Start: 2024-07-26 | End: 2024-07-26

## 2024-07-26 RX ORDER — FENTANYL CITRATE 50 UG/ML
INJECTION, SOLUTION INTRAMUSCULAR; INTRAVENOUS
Status: DISCONTINUED | OUTPATIENT
Start: 2024-07-26 | End: 2024-07-26

## 2024-07-26 RX ORDER — ONDANSETRON HYDROCHLORIDE 2 MG/ML
INJECTION, SOLUTION INTRAVENOUS
Status: DISCONTINUED | OUTPATIENT
Start: 2024-07-26 | End: 2024-07-26

## 2024-07-26 RX ORDER — FAMOTIDINE 10 MG/ML
INJECTION INTRAVENOUS
Status: DISCONTINUED | OUTPATIENT
Start: 2024-07-26 | End: 2024-07-26

## 2024-07-26 RX ORDER — GLUCAGON 1 MG
1 KIT INJECTION
Status: DISCONTINUED | OUTPATIENT
Start: 2024-07-26 | End: 2024-07-26 | Stop reason: HOSPADM

## 2024-07-26 RX ORDER — PHENYLEPHRINE HYDROCHLORIDE 10 MG/ML
INJECTION INTRAVENOUS
Status: DISCONTINUED | OUTPATIENT
Start: 2024-07-26 | End: 2024-07-26

## 2024-07-26 RX ORDER — VASOPRESSIN 20 [USP'U]/ML
INJECTION, SOLUTION INTRAMUSCULAR; SUBCUTANEOUS
Status: DISCONTINUED | OUTPATIENT
Start: 2024-07-26 | End: 2024-07-26

## 2024-07-26 RX ORDER — MIDAZOLAM HYDROCHLORIDE 1 MG/ML
.5-4 INJECTION, SOLUTION INTRAMUSCULAR; INTRAVENOUS
Status: DISCONTINUED | OUTPATIENT
Start: 2024-07-26 | End: 2024-07-26 | Stop reason: HOSPADM

## 2024-07-26 RX ORDER — MUPIROCIN 20 MG/G
OINTMENT TOPICAL
Status: DISCONTINUED | OUTPATIENT
Start: 2024-07-26 | End: 2024-07-26 | Stop reason: HOSPADM

## 2024-07-26 RX ORDER — CLINDAMYCIN PHOSPHATE 900 MG/50ML
900 INJECTION, SOLUTION INTRAVENOUS
Status: COMPLETED | OUTPATIENT
Start: 2024-07-26 | End: 2024-07-26

## 2024-07-26 RX ORDER — ROCURONIUM BROMIDE 10 MG/ML
INJECTION, SOLUTION INTRAVENOUS
Status: DISCONTINUED | OUTPATIENT
Start: 2024-07-26 | End: 2024-07-26

## 2024-07-26 RX ADMIN — VASOPRESSIN 1 UNITS: 20 INJECTION INTRAVENOUS at 11:07

## 2024-07-26 RX ADMIN — SODIUM CHLORIDE: 0.9 INJECTION, SOLUTION INTRAVENOUS at 10:07

## 2024-07-26 RX ADMIN — DEXMEDETOMIDINE 4 MCG: 100 INJECTION, SOLUTION, CONCENTRATE INTRAVENOUS at 12:07

## 2024-07-26 RX ADMIN — PROPOFOL 200 MG: 10 INJECTION, EMULSION INTRAVENOUS at 10:07

## 2024-07-26 RX ADMIN — VASOPRESSIN 0.5 UNITS: 20 INJECTION INTRAVENOUS at 10:07

## 2024-07-26 RX ADMIN — ACETAMINOPHEN 1000 MG: 10 INJECTION, SOLUTION INTRAVENOUS at 11:07

## 2024-07-26 RX ADMIN — FENTANYL CITRATE 50 MCG: 50 INJECTION INTRAMUSCULAR; INTRAVENOUS at 09:07

## 2024-07-26 RX ADMIN — PHENYLEPHRINE HYDROCHLORIDE 100 MCG: 10 INJECTION INTRAVENOUS at 10:07

## 2024-07-26 RX ADMIN — ROCURONIUM BROMIDE 50 MG: 10 INJECTION, SOLUTION INTRAVENOUS at 10:07

## 2024-07-26 RX ADMIN — FAMOTIDINE 20 MG: 10 INJECTION, SOLUTION INTRAVENOUS at 11:07

## 2024-07-26 RX ADMIN — LIDOCAINE HYDROCHLORIDE 100 MG: 20 INJECTION INTRAVENOUS at 10:07

## 2024-07-26 RX ADMIN — MUPIROCIN: 20 OINTMENT TOPICAL at 08:07

## 2024-07-26 RX ADMIN — SUGAMMADEX 200 MG: 100 INJECTION, SOLUTION INTRAVENOUS at 12:07

## 2024-07-26 RX ADMIN — CLINDAMYCIN IN 5 PERCENT DEXTROSE 900 MG: 18 INJECTION, SOLUTION INTRAVENOUS at 10:07

## 2024-07-26 RX ADMIN — ONDANSETRON 4 MG: 2 INJECTION INTRAMUSCULAR; INTRAVENOUS at 10:07

## 2024-07-26 RX ADMIN — BUPIVACAINE HYDROCHLORIDE 30 ML: 5 INJECTION, SOLUTION EPIDURAL; INTRACAUDAL; PERINEURAL at 09:07

## 2024-07-26 RX ADMIN — ROCURONIUM BROMIDE 30 MG: 10 INJECTION, SOLUTION INTRAVENOUS at 11:07

## 2024-07-26 RX ADMIN — MIDAZOLAM 1 MG: 1 INJECTION INTRAMUSCULAR; INTRAVENOUS at 09:07

## 2024-07-26 RX ADMIN — BUPIVACAINE HYDROCHLORIDE 20 ML: 5 INJECTION, SOLUTION EPIDURAL; INTRACAUDAL at 09:07

## 2024-07-26 RX ADMIN — SODIUM CHLORIDE, SODIUM GLUCONATE, SODIUM ACETATE, POTASSIUM CHLORIDE, MAGNESIUM CHLORIDE, SODIUM PHOSPHATE, DIBASIC, AND POTASSIUM PHOSPHATE: .53; .5; .37; .037; .03; .012; .00082 INJECTION, SOLUTION INTRAVENOUS at 11:07

## 2024-07-26 RX ADMIN — CEFAZOLIN 2 G: 330 INJECTION, POWDER, FOR SOLUTION INTRAMUSCULAR; INTRAVENOUS at 10:07

## 2024-07-26 RX ADMIN — VASOPRESSIN 0.5 UNITS: 20 INJECTION INTRAVENOUS at 11:07

## 2024-07-26 RX ADMIN — FENTANYL CITRATE 50 MCG: 50 INJECTION, SOLUTION INTRAMUSCULAR; INTRAVENOUS at 10:07

## 2024-07-26 NOTE — ANESTHESIA PREPROCEDURE EVALUATION
07/26/2024    Cortes Schroeder is a 64 y.o. male with a PMHx significant for  anxiety, HLD, HTN, and T2DM who presents to the ED after a fall. Imaging shows right trimalleolar ankle fracture with skin tenting & non-displaced left distal radius fracture.        Previous Airway (7/18/24):    Induction:  Intravenous    Intubated:  Postinduction    Mask Ventilation:  Easy with oral airway    Attempts:  1    Attempted By:  CRNA    Method of Intubation:  Video laryngoscopy    Blade:  Bravo 3    Laryngeal View Grade: Grade I - full view of cords      Difficult Airway Encountered?: No      Complications:  None    Airway Device:  Oral endotracheal tube    Airway Device Size:  7.5    Style/Cuff Inflation:  Cuffed (inflated to minimal occlusive pressure)    Tube secured:  24    Secured at:  The lips    Placement Verified By:  Capnometry    Complicating Factors:  None    Findings Post-Intubation:  BS equal bilateral and atraumatic/condition of teeth unchanged      LDA:        Peripheral IV - Single Lumen 07/18/24 1841 18 G Right Antecubital (Active)   Site Assessment Dry;Clean;Intact 07/19/24 0200   Extremity Assessment Distal to IV No abnormal discoloration 07/19/24 0200   Line Status Saline locked;Flushed 07/19/24 0200   Dressing Status Clean;Dry;Intact 07/19/24 0200   Dressing Intervention Integrity maintained 07/19/24 0200   Number of days: 0        0.9% NaCl   Intravenous Continuous           Patient Active Problem List   Diagnosis    Hyperlipidemia    Anxiety    Chronic bilateral low back pain without sciatica    Neuropathy    Uncontrolled type 2 diabetes mellitus with diabetic polyneuropathy, with long-term current use of insulin    NPDR (nonproliferative diabetic retinopathy)    Insomnia    Gastroesophageal reflux disease without esophagitis    Hypertension, well controlled    Chronic pain syndrome    Diabetic nephropathy associated with type 2 diabetes mellitus    Right sided weakness    Cervical syndrome     Chronic neck pain    Muscle weakness    Impaired motor control    Decreased range of motion (ROM) of shoulder    Cervical spondylosis without myelopathy    Neuroforaminal stenosis of cervical spine    Right cervical radiculopathy    Cervical radiculopathy    DDD (degenerative disc disease), lumbar    Insulin pump status    Obesity    High risk homosexual behavior    Lumbar radiculopathy    Lumbar spondylosis    Left lumbar radiculitis    Closed trimalleolar fracture of right ankle    Hypertension, essential    Polyneuropathy due to type 2 diabetes mellitus    Uncontrolled type 2 diabetes mellitus with hyperglycemia    Hyponatremia    Abnormal thyroid blood test    Closed fracture of left distal radius    Multiple falls    Syncope    Class 1 obesity due to excess calories with serious comorbidity and body mass index (BMI) of 33.0 to 33.9 in adult       Review of patient's allergies indicates:   Allergen Reactions    Invokana [canagliflozin] Anaphylaxis    Percocet [oxycodone-acetaminophen] Nausea Only and Hallucinations    Biaxin [clarithromycin]     Hydrocodone Other (See Comments)     Dizzy/nausea/hallucinations    Sulfa (sulfonamide antibiotics) Nausea Only and Rash       Current Outpatient Medications   Medication Instructions    acetaminophen (TYLENOL) 650 mg, Oral, Every 6 hours    aspirin (ECOTRIN) 81 mg, Oral, 2 times daily, End date sept 20, 2024    atorvastatin (LIPITOR) 40 mg, Oral, Daily    blood-glucose sensor (DEXCOM G6 SENSOR) Omaira Change sensor every 10 days    blood-glucose transmitter (DEXCOM G6 TRANSMITTER) Omaira Change transmitter every 3 months    celecoxib (CELEBREX) 200 mg, Oral, Daily    cyanocobalamin (VITAMIN B-12) 1,000 mcg, Oral, Daily    DULoxetine (CYMBALTA) 60 mg, Oral, Daily    emtricitabine-tenofovir 200-300 mg (TRUVADA) 200-300 mg Tab 1 tablet, Oral, Daily    enoxaparin (LOVENOX) 40 mg, Subcutaneous, Daily    fish oil-omega-3 fatty acids 300-1,000 mg capsule 1 capsule, Oral, 2 times  daily    gabapentin (NEURONTIN) 300 MG capsule Take 2 capsules (600 mg total) by mouth once daily AND 4 capsules (1,200 mg total) every evening.    HUMALOG U-100 INSULIN 100 unit/mL injection 1:20 U PRN high blood sugar and snacks. Correction dose- Enter carb coverage intake upon administration<BR>Target number 120 Carbohydrate coverage #1 1:2 Carbohydrate coverage #1 time [id number] Carbohydrate coverage #2 1:3 Carbohydrate coverage #2 time 1620-1820 Carbohydrate coverage #3 Carbohydrate coverage #3 time Carbohydrate coverage #4 Carbohydrate coverage #4 time Sensitivity #1 1:20 Sensitivity #1 time 4516-2281 Sensitivity #2 Sensitivity #2 time Sensitivity #3 Sensitivity #3 time Sensitivity #4 Sensitivity #4 time Sensitivity #5 Sensitivity #5 time Order Questions Question Answer Comment <BR><BR><BR>50 U SQ continuous<BR>Target number 120 Basal Rate #1 2.3 Basal rate #1 time 2566-1050 Basal Rate #2 1.55 Basal rate #2 time 1376-8388 Basal rate #3 Basal rate #3 time Basal rate #4 Basal rate #4 time Basal rate #5 Basal rate #5 time    losartan (COZAAR) 25 mg, Oral, Daily    magnesium oxide (MAG-OX) 200 mg, Oral, Daily    melatonin (MELATIN) 6 mg, Oral, Nightly PRN    methocarbamoL (ROBAXIN) 500 mg, Oral, 4 times daily PRN    morphine (MSIR) 15 mg, Oral, Every 4 hours PRN    MOUNJARO 10 mg, Subcutaneous, Weekly, HOLD until all surgeries completed and out of rehab    nortriptyline (PAMELOR) 50 mg, Oral, Nightly    ondansetron (ZOFRAN-ODT) 8 mg, Oral, Every 6 hours PRN    polyethylene glycol (GLYCOLAX) 17 g, Oral, Daily    rosuvastatin (CRESTOR) 10 mg, Nightly    senna-docusate 8.6-50 mg (PERICOLACE) 8.6-50 mg per tablet 1 tablet, Oral, 2 times daily    vitamin D (VITAMIN D3) 1,000 Units, Oral, 2 times daily       Past Surgical History:   Procedure Laterality Date    APPLICATION, EXTERNAL FIXATION DEVICE, FOR ANKLE FRACTURE Right 7/18/2024    Procedure: APPLICATION, EXTERNAL FIXATION DEVICE, FOR ANKLE FRACTURE;  Surgeon:  Moe Liz MD;  Location: [facility] OR [address] [place] [address];  Service: Orthopedics;  Laterality: Right;    BACK SURGERY  2003    Lumbar Spine    CATARACT EXTRACTION      CLOSED REDUCTION, FRACTURE, ANKLE, TRIMALLEOLAR Right 7/18/2024    Procedure: CLOSED REDUCTION, FRACTURE, ANKLE, TRIMALLEOLAR;  Surgeon: Moe Liz MD;  [facility];  Service: Orthopedics;  Laterality: Right;    EPIDURAL STEROID INJECTION N/A 1/16/2019    Procedure: Injection, Steroid, Epidural Cervical;  Surgeon: Andrew Sinclair Jr., MD;  Location: [facility] ENDO;  Service: Pain Management;  Laterality: N/A;  Cervical Epidural Steroid Injection C7-T1    48533    Arrive @ 1240    EPIDURAL STEROID INJECTION N/A 2/20/2019    Procedure: Injection, Steroid, Epidural;  Surgeon: Andrew Sinclair Jr., MD;  Location: [facility] ENDO;  Service: Pain Management;  Laterality: N/A;  Lumbar Epidural Steroid Injection L4-5    01310    Arrive @ 1215 (requests latest time)    INJECTION, SPINE, LUMBOSACRAL, TRANSFORAMINAL APPROACH Bilateral 6/14/2024    Procedure: Bilateral L5 Transforaminal Epidural Steroid Injections;  Surgeon: Andrew Sinclair Jr., MD;  Location: [facility] PAIN MANAGEMENT;  Service: Pain Management;  Laterality: Bilateral;  @1300(given)  ASA last 6/8  Check BG  MD Sign.    OPEN REDUCTION AND INTERNAL FIXATION (ORIF) OF FRACTURE OF DISTAL RADIUS Left 7/19/2024    Procedure: ORIF, FRACTURE, RADIUS, DISTAL - LEFT;  Surgeon: Moe Liz MD;  [facility];  Service: Orthopedics;  Laterality: Left;  LEFT, SYNTHES, C ARM       Social History     Substance and Sexual Activity   Drug Use No     Alcohol Use: Not At Risk (7/23/2024)    Received from [facility]    AUDIT-C     Frequency of Alcohol Consumption: Monthly or less     Average Number of Drinks: 1 or 2     Frequency of Binge Drinking: Never     Tobacco Use: Low Risk  (7/26/2024)    Patient History     Smoking Tobacco Use: Never     Smokeless Tobacco Use: Never      Passive Exposure: Not on file       OBJECTIVE:     Vital Signs Range (Last 24H):  Temp:  [36.6 °C (97.9 °F)]   Pulse:  [73-87]   Resp:  [16-18]   BP: (108-141)/(65-83)   SpO2:  [98 %]       Significant Labs    Heme Profile  Lab Results   Component Value Date    WBC 8.81 07/23/2024    HGB 11.8 (L) 07/23/2024    HCT 35.9 (L) 07/23/2024     07/23/2024       Coagulation Studies  Lab Results   Component Value Date    LABPROT 10.9 07/18/2024    INR 1.0 07/18/2024       BMP  Lab Results   Component Value Date     07/23/2024    K 4.6 07/23/2024     07/23/2024    CO2 24 07/23/2024    BUN 19 07/23/2024    CREATININE 1.0 07/23/2024    MG 2.0 07/23/2024    PHOS 3.8 07/23/2024       Liver Function Tests  Lab Results   Component Value Date    AST 29 07/23/2024    ALT 26 07/23/2024    ALKPHOS 124 07/23/2024    BILITOT 0.4 07/23/2024    PROT 5.9 (L) 07/23/2024    ALBUMIN 2.4 (L) 07/23/2024       Lipid Profile  Lab Results   Component Value Date    CHOL 104 09/23/2021    HDL 38 (L) 09/23/2021    TRIG 203 (H) 09/23/2021       Endocrine Profile  Lab Results   Component Value Date    HGBA1C 9.9 (H) 07/18/2024    TSH 6.243 (H) 07/18/2024         Cardiac Studies    EKG:   Results for orders placed or performed during the hospital encounter of 10/30/16   EKG 12-lead (Reason:chest pain)    Collection Time: 10/30/16  3:17 PM    Narrative    Test Reason :   Blood Pressure :  mmHG  Vent. Rate : 097 BPM     Atrial Rate : 097 BPM     P-R Int : 160 ms          QRS Dur : 076 ms      QT Int : 366 ms       P-R-T Axes : 052 021 083 degrees     QTc Int : 464 ms    Age and gender specific analysis  Normal sinus rhythm  Cannot rule out Anterior infarct ,age undetermined  Abnormal ECG  When compared with ECG of 10-FEB-2015 11:22,  Significant changes have occurred  Confirmed by Barbi Carlos MD ([id number]) on 11/1/2016 10:51:14 AM    Referred By: SELF REFERRAL           Confirmed By:Barbi Carlos MD       TTE:  No results found for this  or any previous visit.    ASSESSMENT/PLAN:         Pre-op Assessment    I have reviewed the Patient Summary Reports.     I have reviewed the Nursing Notes. I have reviewed the NPO Status.   I have reviewed the Medications.     Review of Systems  Anesthesia Hx:  No problems with previous Anesthesia               Denies Personal Hx of Anesthesia complications.                    Cardiovascular:     Hypertension           hyperlipidemia                             Renal/:  Chronic Renal Disease                Hepatic/GI:     GERD             Musculoskeletal:  Arthritis          Spine Disorders: cervical            Endocrine:  Diabetes, type 2         Obesity / BMI > 30  Psych:   anxiety                 Physical Exam  General: Well nourished, Cooperative and Alert    Airway:  Mallampati: III   Mouth Opening: Small, but > 3cm  TM Distance: Normal  Tongue: Normal  Neck ROM: Normal ROM, Flexion Decreased    Dental:  Periodontal disease        Anesthesia Plan  Type of Anesthesia, risks & benefits discussed:    Anesthesia Type: Gen ETT, Regional  Intra-op Monitoring Plan: Standard ASA Monitors  Post Op Pain Control Plan: multimodal analgesia, IV/PO Opioids PRN and peripheral nerve block  Induction:  IV  Airway Plan: Direct, Post-Induction  Informed Consent: Informed consent signed with the Patient and all parties understand the risks and agree with anesthesia plan.  All questions answered.   ASA Score: 3  Day of Surgery Review of History & Physical: H&P Update referred to the surgeon/provider.    Ready For Surgery From Anesthesia Perspective.     .

## 2024-07-26 NOTE — OP NOTE
OP NOTE    DOS:  07/26/2024    Preop Dx: Right trimalleolar ankle fracture dislocation status post closed reduction and external fixation    Postop Dx: Right trimalleolar ankle fracture dislocation status post closed reduction and external fixation    Right ankle syndesmosis disruption    Right superficial peroneal nerve entrapment    Procedure: Open reduction internal fixation right trimalleolar ankle fracture with fixation of medial, lateral and posterior malleoli - 87026    Open treatment right ankle syndesmosis disruption with reduction and internal fixation - 77085    Removal, under anesthesia of external fixation right ankle - 50293    Neurolysis right superficial peroneal nerve - 95993    Surgeon: Moe Liz M.D.    Asst:  Andreas Castaneda M.D    Anesthesia: GETA    EBL:  Minimal    IVF:  1000 cc crystalloid    Implants: Synthes    Specimens: None    Findings: Stable fixation.  Good alignment.    Dispo:  To PACU extubated/stable       Indications for Procedure:      64-year-old male sustained twisting injury to the right lower extremity resulting in a right trimalleolar ankle fracture dislocation.  He was treated initially with closed reduction and external fixation.  He is now returning for definitive fixation.  The risks, benefits and alternatives to surgery discussed at length with the patient prior to going to the operating room including his increased risk for wound breakdown and infection secondary to poorly controlled diabetes.  Informed consent was obtained.    Procedure in Detail:    Patient was identified in preoperative holding area and regional analgesia administered.  Patient was wheeled to the operating room placed on the operating table in supine position.  General anesthesia was induced and preoperative antibiotics were administered to include Ancef and clindamycin.  A time-out was undertaken to confirm patient, side, site, surgery, surgeon and administration of preoperative antibiotics.   All agreed we proceeded.    I deconstructed the external fixator bars and clamps.  I then removed the pins from the tibia, 1st metatarsal and calcaneus after cleansing them thoroughly with alcohol.  Patient was then placed in a nonsterile tourniquet and prepped and draped in sterile fashion.  The leg was exsanguinated and the tourniquet was raised.      I made a straight lateral incision dissected down to the level of the fibula fracture.  The superficial peroneal nerve was coursing in that area and the nerve itself was stuck at the fracture site in the fibula.  Therefore, I dissected the superficial peroneal nerve along its entire course with a 15 blade and Metzenbaum scissors to free it from its tether in that area and to keep it protected during closure.    There was some comminution of the top of the fracture.  The patient had an anterolateral portion of the tibia at the syndesmosis which was also slightly detached but too small for fixation.  I cleaned out the fracture site in the fibula and then went through that with a [place] in order to disengage the posterior malleolar fracture fragment to allow for reduction.  I then clamped the fibula into a reduced position and fixed this with a 2 mm K-wire from posterior to anterior to hold that position.      I then made a small stab incision posteriorly and placed a guidewire for a 4 mm cannulated screw into the posterior malleolar fracture fragment.  This was measured and a screw was placed securing the posterior malleolar fracture.  I then placed a 7 hole Synthes locking fibular plate on the lateral malleolus.  I fixed it with a bicortical screw distally to pull it down to bone.  I then placed 3 screws in the shaft proximally.  I then placed locking screws in the remainder of the distal holes and then removed the bicortical distal screw and replaced this with a unicortical locking screw.    At this point I reduced the fibula into the incisura under direct  visualization.  I then placed 2 syndesmosis screws with the fibula well reduced.    I made a medial dissection down to the medial malleolus fracture.  I cleared the periosteum at the fracture site and clamped this into a reduced position.  I then placed 2 bicortical medial malleolar screws getting very good purchase and anatomic fixation.    Final images were taken showing good reduction and fixation.  The tourniquet was let down hemostasis obtained with electrocautery.  The wounds were copiously irrigated normal saline solution.  The external fixator pin holes were cleansed as well.  The deep fascia was closed with 0 Vicryl suture over vancomycin cefepime powder protecting the nerves on both sides.  The next layer tissue was closed with 3-0 Vicryl suture and the skin with 3-0 nylon suture in running fashion.  Given its proven efficacy in decreasing wound breakdown increasing blood flow to the skin, and given his poorly controlled diabetes, a Prevena restore 2 week incisional wound VAC was placed.  Patient was then placed into a posterior short-leg splint with stirrups.    All instrument and sponge counts were reported correct at the end of the case.  There were no complications.  The patient was awakened and taken to recovery room in stable condition.      Plan the patient:     Ice and elevation for the length.  Nonweightbearing right lower extremity below the knee.  He will go back to inpatient rehab today and continue to use a platform walker with a distal radius fracture and be nonweightbearing on the right lower extremity.  Multimodal pain management limiting narcotics.  Good blood glucose control to help prevent infection and wound breakdown.  X-rays right ankle and left distal radius and follow-up visits.    Moe Liz MD

## 2024-07-26 NOTE — ANESTHESIA PROCEDURE NOTES
R Popliteal SS    Patient location during procedure: pre-op   Block not for primary anesthetic.  Reason for block: at surgeon's request and post-op pain management   Post-op Pain Location: R Leg Pain   Start time: 7/26/2024 9:40 AM  Timeout: 7/26/2024 9:40 AM   End time: 7/26/2024 9:50 AM    Staffing  Authorizing Provider: Simran Alcaraz MD  Performing Provider: Aubrie Coello MD    Staffing  Performed by: Aubrie Coello MD  Authorized by: Simran Alcaraz MD    Preanesthetic Checklist  Completed: patient identified, IV checked, site marked, risks and benefits discussed, surgical consent, monitors and equipment checked, pre-op evaluation and timeout performed  Peripheral Block  Patient position: supine  Prep: ChloraPrep  Patient monitoring: heart rate, cardiac monitor, continuous pulse ox, continuous capnometry and frequent blood pressure checks  Block type: popliteal  Laterality: right  Injection technique: single shot  Needle  Needle type: Stimuplex   Needle gauge: 21 G  Needle length: 4 in  Needle localization: anatomical landmarks and ultrasound guidance   -ultrasound image captured on disc.  Assessment  Injection assessment: negative aspiration, negative parasthesia and local visualized surrounding nerve  Paresthesia pain: none  Heart rate change: no  Slow fractionated injection: yes  Pain Tolerance: comfortable throughout block and no complaints  Medications:    Medications: bupivacaine (pf) (MARCAINE) injection 0.5% - Perineural   30 mL - 7/26/2024 9:50:00 AM    Additional Notes  VSS.  [facility] RN monitoring vitals throughout procedure.  Patient tolerated procedure well.

## 2024-07-26 NOTE — PLAN OF CARE
Pt is AAOX4,VSS. Returning to [facility], report given to nurse. Reviewed discharge teaching with pt. All questions answered. IV removed. Cell phone returned to pt. Post-op xray completed and MD Castaneda notified. Ambulance transfer here to  pt. Pt ready for discharge.

## 2024-07-26 NOTE — TRANSFER OF CARE
"Anesthesia Transfer of Care Note    Patient: Cortes Schroeder    Procedure(s) Performed: Procedure(s) (LRB):  ORIF, ANKLE (Right)  REMOVAL, EXTERNAL FIXATION DEVICE (Right)  FIXATION, SYNDESMOSIS, ANKLE (Right)    Patient location: [facility]    Anesthesia Type: general and regional    Transport from OR: Transported from OR on 6-10 L/min O2 by face mask with adequate spontaneous ventilation    Post pain: adequate analgesia    Post assessment: no apparent anesthetic complications    Post vital signs: stable    Level of consciousness: awake and alert    Nausea/Vomiting: no nausea/vomiting    Complications: none    Transfer of care protocol was followed      Last vitals: Visit Vitals  /68 (BP Location: Right arm, Patient Position: Lying)   Pulse 87   Temp 36.6 °C (97.9 °F) (Oral)   Resp 20   Ht 5' 5" (1.651 m)   Wt 93 kg (205 lb)   SpO2 97%   BMI 34.11 kg/m²     "

## 2024-07-26 NOTE — BRIEF OP NOTE
[facility]in Medel - Surgery ([facility])  Brief Operative Note    Surgery Date: 7/26/2024     Surgeons and Role:     * Moe Liz MD - Primary     * Andreas Castaneda MD - Resident - Assisting        Pre-op Diagnosis:  Closed trimalleolar fracture of right ankle, initial encounter [S82.851A]    Post-op Diagnosis:  Post-Op Diagnosis Codes:     * Closed trimalleolar fracture of right ankle, initial encounter [S82.851A]    Procedure(s) (LRB):  ORIF, ANKLE (Right)  REMOVAL, EXTERNAL FIXATION DEVICE (Right)  FIXATION, SYNDESMOSIS, ANKLE (Right)    Anesthesia: General/Regional    Operative Findings: Right ankle fracture    Estimated Blood Loss: < 100 cc         Specimens:   Specimen (24h ago, onward)      None              Discharge Note    OUTCOME: Patient tolerated treatment/procedure well without complication and is now ready for discharge.    DISPOSITION: Home or Self Care    FINAL DIAGNOSIS:  Closed trimalleolar fracture of right ankle    FOLLOWUP: In clinic    DISCHARGE INSTRUCTIONS:    Discharge Procedure Orders   Notify your health care provider if you experience any of the following:  temperature >100.4     Notify your health care provider if you experience any of the following:  persistent nausea and vomiting or diarrhea     Notify your health care provider if you experience any of the following:  severe uncontrolled pain     Notify your health care provider if you experience any of the following:  redness, tenderness, or signs of infection (pain, swelling, redness, odor or green/yellow discharge around incision site)     Notify your health care provider if you experience any of the following:  difficulty breathing or increased cough     Notify your health care provider if you experience any of the following:  severe persistent headache     Notify your health care provider if you experience any of the following:  worsening rash     Notify your health care provider if you experience any of the following:  persistent  dizziness, light-headedness, or visual disturbances     Notify your health care provider if you experience any of the following:  increased confusion or weakness     Leave dressing on - Keep it clean, dry, and intact until clinic visit     Weight bearing restrictions (specify):   Order Comments: RENE AGUIRRE

## 2024-07-26 NOTE — PROGRESS NOTES
Preop complete. Patient came from [facility]. Questions answered. Phone to be placed in locker when in surgery. Friend signed up for texts.

## 2024-07-26 NOTE — ANESTHESIA PROCEDURE NOTES
R Saphenous SS    Patient location during procedure: pre-op   Block not for primary anesthetic.  Reason for block: at surgeon's request and post-op pain management   Post-op Pain Location: R Leg Pain   Start time: 7/26/2024 9:40 AM  Timeout: 7/26/2024 9:40 AM   End time: 7/26/2024 9:50 AM    Staffing  Authorizing Provider: Simran Alcaraz MD  Performing Provider: Aubrie Coello MD    Staffing  Performed by: Aubrie Coello MD  Authorized by: Simran Alcaraz MD    Preanesthetic Checklist  Completed: patient identified, IV checked, site marked, risks and benefits discussed, surgical consent, monitors and equipment checked, pre-op evaluation and timeout performed  Peripheral Block  Patient position: supine  Prep: ChloraPrep  Patient monitoring: heart rate, cardiac monitor, continuous pulse ox, continuous capnometry and frequent blood pressure checks  Block type: saphenous  Laterality: right  Injection technique: single shot  Needle  Needle type: Stimuplex   Needle gauge: 21 G  Needle length: 4 in  Needle localization: anatomical landmarks and ultrasound guidance   -ultrasound image captured on disc.  Assessment  Injection assessment: negative aspiration, negative parasthesia and local visualized surrounding nerve  Paresthesia pain: none  Heart rate change: no  Slow fractionated injection: yes  Pain Tolerance: comfortable throughout block and no complaints  Medications:    Medications: bupivacaine (pf) (MARCAINE) injection 0.5% - Perineural   20 mL - 7/26/2024 9:50:00 AM    Additional Notes  VSS.  [facility] RN monitoring vitals throughout procedure.  Patient tolerated procedure well.

## 2024-07-26 NOTE — INTERVAL H&P NOTE
The patient has been examined and the H&P has been reviewed:    I concur with the findings and no changes have occurred since H&P was written.    The risks, benefits and alternatives to surgery were discussed with the patient at great length.  These include bleeding, infection, vessel/nerve damage, pain, numbness, tingling, complex regional pain syndrome, hardware/surgical failure, need for further surgery, malunion, nonunion, DVT, PE, arthritis and death.  Last hemoglobin A1c was 9.9.  I have had extensive discussions with the patient about the increased risk for wound healing problems and infection secondary to poorly controlled diabetes.  Patient states an understanding and wishes to proceed with surgery.   All questions were answered.  No guarantees were implied or stated.  Informed consent was obtained.            Active Hospital Problems    Diagnosis  POA    *Closed trimalleolar fracture of right ankle [Q99.896L]  Yes      Resolved Hospital Problems   No resolved problems to display.

## 2024-08-01 DIAGNOSIS — R55 SYNCOPE, UNSPECIFIED SYNCOPE TYPE: Primary | ICD-10-CM

## 2024-08-01 DIAGNOSIS — R29.6 MULTIPLE FALLS: ICD-10-CM

## 2024-08-09 ENCOUNTER — OFFICE VISIT (OUTPATIENT)
Dept: ORTHOPEDICS | Facility: CLINIC | Age: 64
End: 2024-08-09
Payer: COMMERCIAL

## 2024-08-09 ENCOUNTER — HOSPITAL ENCOUNTER (OUTPATIENT)
Dept: RADIOLOGY | Facility: HOSPITAL | Age: 64
Discharge: HOME | End: 2024-08-09
Attending: PHYSICIAN ASSISTANT
Payer: COMMERCIAL

## 2024-08-09 DIAGNOSIS — T14.8XXA FRACTURE: ICD-10-CM

## 2024-08-09 DIAGNOSIS — S82.851A CLOSED TRIMALLEOLAR FRACTURE OF RIGHT ANKLE, INITIAL ENCOUNTER: Primary | ICD-10-CM

## 2024-08-09 DIAGNOSIS — S82.851A CLOSED TRIMALLEOLAR FRACTURE OF RIGHT ANKLE, INITIAL ENCOUNTER: ICD-10-CM

## 2024-08-09 PROCEDURE — 99024 POSTOP FOLLOW-UP VISIT: CPT | Mod: S$GLB,,, | Performed by: PHYSICIAN ASSISTANT

## 2024-08-09 PROCEDURE — 73110 X-RAY EXAM OF WRIST: CPT | Mod: TC,LT

## 2024-08-09 PROCEDURE — 73610 X-RAY EXAM OF ANKLE: CPT | Mod: TC,RT

## 2024-08-09 PROCEDURE — 73610 X-RAY EXAM OF ANKLE: CPT | Mod: 26,RT,, | Performed by: RADIOLOGY

## 2024-08-09 PROCEDURE — 4010F ACE/ARB THERAPY RXD/TAKEN: CPT | Mod: CPTII,S$GLB,, | Performed by: PHYSICIAN ASSISTANT

## 2024-08-09 PROCEDURE — 99999 PR PBB SHADOW E&M-EST. PATIENT-LVL IV: CPT | Mod: PBBFAC,,, | Performed by: PHYSICIAN ASSISTANT

## 2024-08-09 PROCEDURE — 73110 X-RAY EXAM OF WRIST: CPT | Mod: 26,LT,, | Performed by: RADIOLOGY

## 2024-08-09 PROCEDURE — 3046F HEMOGLOBIN A1C LEVEL >9.0%: CPT | Mod: CPTII,S$GLB,, | Performed by: PHYSICIAN ASSISTANT

## 2024-08-16 ENCOUNTER — OFFICE VISIT (OUTPATIENT)
Dept: ORTHOPEDICS | Facility: CLINIC | Age: 64
End: 2024-08-16
Payer: COMMERCIAL

## 2024-08-16 DIAGNOSIS — S52.502D CLOSED FRACTURE OF DISTAL END OF LEFT RADIUS WITH ROUTINE HEALING, UNSPECIFIED FRACTURE MORPHOLOGY, SUBSEQUENT ENCOUNTER: ICD-10-CM

## 2024-08-16 DIAGNOSIS — S82.851A CLOSED TRIMALLEOLAR FRACTURE OF RIGHT ANKLE, INITIAL ENCOUNTER: Primary | ICD-10-CM

## 2024-08-16 PROCEDURE — 99999 PR PBB SHADOW E&M-EST. PATIENT-LVL IV: CPT | Mod: PBBFAC,,, | Performed by: PHYSICIAN ASSISTANT

## 2024-08-16 PROCEDURE — 1159F MED LIST DOCD IN RCRD: CPT | Mod: CPTII,S$GLB,, | Performed by: PHYSICIAN ASSISTANT

## 2024-08-16 PROCEDURE — 3046F HEMOGLOBIN A1C LEVEL >9.0%: CPT | Mod: CPTII,S$GLB,, | Performed by: PHYSICIAN ASSISTANT

## 2024-08-16 PROCEDURE — 99024 POSTOP FOLLOW-UP VISIT: CPT | Mod: S$GLB,,, | Performed by: PHYSICIAN ASSISTANT

## 2024-08-16 PROCEDURE — 4010F ACE/ARB THERAPY RXD/TAKEN: CPT | Mod: CPTII,S$GLB,, | Performed by: PHYSICIAN ASSISTANT

## 2024-08-16 NOTE — PROGRESS NOTES
"  Principal Orthopedic Problem:   63 yo M, ground level fall 07/18/2  Right trimalleolar ankle fracture              07/18/24 ([facility]) external fixation ankle trimal, right              07/26/24 ( [facility]) ORIF ankle, right removal fo external fixation device.      left intra-articular distal radius fracture               Reduced and splinted              07/19/24 ([facility]) ORIF distal radius, right     Relevant Medical History: according to chart     PMHX: DM, neuropathy of foot      Lives at home with himself, he is here in a condo on contract,   Occupation:  with [employer]   Prior to injury  Independent community ambulator, gait aids   Denies history of stroke, heart attack, cancer, blood clot,   +diabetes A1C 9.9  Denies tobacco use  Denied chronic pain medication use prior to injury  Walks w/out assisted devices at baseline.      Estimated body mass index is 33.78 kg/m² as calculated from the following:    Height as of 7/20/24: 5' 5" (1.651 m).    Weight as of 7/20/24: 92.1 kg (203 lb).      Mr. Schroeder is here today for a post-operative visit     Interval History:  he reports that he is doing ok.   he is at home. he is not participating in PT/OT.   Pain is controlled.  he is  taking pain medication.    he denies fever, chills, and sweats .      Physical exam:    Patient arrives to exam room: wheelchair.  Patient is  accompanied    WRIST  Dressing taken down.  Incision is clean, dry and intact.  Sutures removed without difficulty.   Healing well no signs of breakdown or infection.     ANKLE:   Dressing taken down.  Incision is clean, macerated, blistering along incision laterally. .  Sutures removed .   Healing well no signs of breakdown or infection.        RADS: All pertinent images were reviewed by myself:   None done today     Assessment:  Post-op visit ( 2 weeks)     Plan:  Current care, treatment plan, precautions, activity level/ modifications, limitations, rehabilitation exercises and " proposed future treatment were discussed with the patient. We discussed the need to monitor for changes in symptoms and condition and report them to the physician.  Discussed importance of compliance with all appointments and follow up examinations.      WOUND CARE :  - The patient was advised to keep the incision clean and dry for the next 24 hours after which he may wash the area with antibacterial soap in the shower. Will not submerge until the incision is completely healed  -Patient was advised to monitor wound closely and multiple times daily for any problems. Call clinic immediately or report to ED for immediate medical attention for any complications including reopening of wound, drainage, purulence, redness, streaking, odor, pain out of proportion, fever, chills, etc.   -- If there are signs of infection, please call [facility] [phone] for further instructions and to make appt to be seen.   .       ACTIVITY:   - light  WRIST  -range of motion as tolerated               - NWB ok use platform walker   ANKLE:              Range of motion               NWB                -PT/OT, , Patient is responsible to establish and continue care                 PAIN MEDICATION:   - Multimodal pain control  - Pain medication: refill was needed  - Pain medication refill policy provided to patient for review, yes.    - Patient was informed a multi-modal approach is used to treat their pain. With the goal to get off of narcotic pain medication and discontinue as soon as possible.   - ice and elevation to reduce pain and swelling     DVT PROPHYLAXIS:   - ASA 81 mg bid     FOLLOW UP:   - Patient will follow up in the clinic in 3 weeks, sooner if any concerns.  - X-ray of his ankle and wrist is needed. NWB OOB        If there are any questions prior to scheduled follow up, the patient was instructed to contact the office

## 2024-08-16 NOTE — PROGRESS NOTES
"Principal Orthopedic Problem:   65 yo M, ground level fall 07/18/2  Right trimalleolar ankle fracture              07/18/24 ([facility]) external fixation ankle trimal, right   07/26/24 ( [facility]) ORIF ankle, right removal fo external fixation device.      left intra-articular distal radius fracture               Reduced and splinted              07/19/24 ([facility]) ORIF distal radius, right     Relevant Medical History: according to chart     PMHX: DM, neuropathy of foot      Lives at home with himself, he is here in a condo on contract,   Occupation:  with [employer]   Prior to injury  Independent community ambulator, gait aids   Denies history of stroke, heart attack, cancer, blood clot,   +diabetes A1C 9.9  Denies tobacco use  Denied chronic pain medication use prior to injury  Walks w/out assisted devices at baseline.      Estimated body mass index is 33.78 kg/m² as calculated from the following:    Height as of 7/20/24: 5' 5" (1.651 m).    Weight as of 7/20/24: 92.1 kg (203 lb).      Mr. Schroeder is here today for a post-operative visit    Interval History:  he reports that he is doing ok.   he is at home. he is not participating in PT/OT.   Pain is controlled.  he is  taking pain medication.    he denies fever, chills, and sweats .     Physical exam:    Patient arrives to exam room: wheelchair.  Patient is  accompanied    WRIST  Dressing taken down.  Incision is clean, dry and intact.  Sutures removed without difficulty.   Healing well no signs of breakdown or infection.    ANKLE:   Dressing taken down.  Incision is clean, macerated, blistering along incision laterally. .  Sutures left in place.   Healing well no signs of breakdown or infection.      RADS: All pertinent images were reviewed by myself:   WRIST:   Postoperative changes of internal fixation of the distal radius fracture identified. The position and alignment is satisfactory     ANKLE:   Postoperative changes of internal fixation of the " distal tibia and fibula identified. The position and alignment is satisfactory and unchanged as compared to the previous study     Assessment:  Post-op visit ( 2 weeks)    Plan:  Current care, treatment plan, precautions, activity level/ modifications, limitations, rehabilitation exercises and proposed future treatment were discussed with the patient. We discussed the need to monitor for changes in symptoms and condition and report them to the physician.  Discussed importance of compliance with all appointments and follow up examinations.     WOUND CARE :  WRIST:  The patient was advised to keep the incision clean and dry for the next 24 hours after which he may wash the area with antibacterial soap in the shower. Will not submerge until the incision is completely healed    ANKLE:   Redressed, is to keep in place until follow up    -Patient was advised to monitor wound closely and multiple times daily for any problems. Call clinic immediately or report to ED for immediate medical attention for any complications including reopening of wound, drainage, purulence, redness, streaking, odor, pain out of proportion, fever, chills, etc.       ACTIVITY:   - light  WRIST  -range of motion as tolerated    - NWB ok use platform walker   ANKLE:   Range of motion to begin once sutures are removed   NWB     -PT/OT, , Patient is responsible to establish and continue care      PAIN MEDICATION:   - Multimodal pain control  - Pain medication: refill was needed  - Pain medication refill policy provided to patient for review, yes.    - Patient was informed a multi-modal approach is used to treat their pain. With the goal to get off of narcotic pain medication and discontinue as soon as possible.   - ice and elevation to reduce pain and swelling     DVT PROPHYLAXIS:   - ASA 81 mg bid    FOLLOW UP:   - Patient will follow up in the clinic in 1 weeks, sooner if any concerns.  - X-ray of his ankle and wrist is NOT needed. NWB OOB  -  Pending healing at time consider removal of sutures       If there are any questions prior to scheduled follow up, the patient was instructed to contact the office

## 2024-08-27 ENCOUNTER — TELEPHONE (OUTPATIENT)
Dept: ORTHOPEDICS | Facility: CLINIC | Age: 64
End: 2024-08-27
Payer: COMMERCIAL

## 2024-08-27 NOTE — TELEPHONE ENCOUNTER
Called and spoke with Romina from [facility] in regards to pt's incision. Informed Ms Pete pt should head to the ED to get is incision checked out. Also informed Ms Pete, Ms Prater is out of the office and will return tomorrow. Ms Newtonan verbally understood and says she will notify the pt.

## 2024-08-27 NOTE — TELEPHONE ENCOUNTER
----- Message from V[employer]rentonHotreader Route sent at 8/27/2024  4:02 PM CDT -----  Regarding: Signs of Infection  Contact: Carine [phone]  Romina with [facility] calling about pt. Carine says incision is showing signs of infection increased redness and swelling. Asking how to move forward with the pt. Best Call Back Number: Carine [phone]

## 2024-08-28 ENCOUNTER — TELEPHONE (OUTPATIENT)
Dept: ORTHOPEDICS | Facility: CLINIC | Age: 64
End: 2024-08-28
Payer: COMMERCIAL

## 2024-08-28 NOTE — TELEPHONE ENCOUNTER
Spoke with pt. Pt was offered an appointment today 8/28/24. Regarding his message that was sent through Teams and not EPIC. Regarding his ankle. Pt stated that he will go and see a provider in [place], [place]. Pt lives in [place].  Pt states that he will get the provider in [place], [place] to take pictures and send to Moi MERCADO PA-C. Patient states verbal understanding and has no further questions.

## 2024-09-05 PROBLEM — R55 SYNCOPE: Status: RESOLVED | Noted: 2024-07-18 | Resolved: 2024-09-05

## 2024-09-06 ENCOUNTER — ANESTHESIA (OUTPATIENT)
Dept: SURGERY | Facility: HOSPITAL | Age: 64
End: 2024-09-06
Payer: COMMERCIAL

## 2024-09-06 ENCOUNTER — ANESTHESIA EVENT (OUTPATIENT)
Dept: SURGERY | Facility: HOSPITAL | Age: 64
End: 2024-09-06
Payer: COMMERCIAL

## 2024-09-06 ENCOUNTER — HOSPITAL ENCOUNTER (INPATIENT)
Facility: HOSPITAL | Age: 64
LOS: 28 days | Discharge: NURSING, REHAB OR LONG-TERM CARE | DRG: 856 | End: 2024-10-04
Attending: EMERGENCY MEDICINE | Admitting: INTERNAL MEDICINE
Payer: COMMERCIAL

## 2024-09-06 DIAGNOSIS — N17.9 AKI (ACUTE KIDNEY INJURY): ICD-10-CM

## 2024-09-06 DIAGNOSIS — E11.65 UNCONTROLLED TYPE 2 DIABETES MELLITUS WITH HYPERGLYCEMIA: ICD-10-CM

## 2024-09-06 DIAGNOSIS — N17.0 ACUTE RENAL FAILURE WITH TUBULAR NECROSIS: ICD-10-CM

## 2024-09-06 DIAGNOSIS — T81.31XD DEHISCENCE OF OPERATIVE WOUND, SUBSEQUENT ENCOUNTER: Primary | ICD-10-CM

## 2024-09-06 DIAGNOSIS — L08.9 SOFT TISSUE INFECTION: ICD-10-CM

## 2024-09-06 DIAGNOSIS — T81.31XA SURGICAL WOUND BREAKDOWN: ICD-10-CM

## 2024-09-06 DIAGNOSIS — S82.851G CLOSED TRIMALLEOLAR FRACTURE OF RIGHT ANKLE WITH DELAYED HEALING, SUBSEQUENT ENCOUNTER: ICD-10-CM

## 2024-09-06 DIAGNOSIS — T81.31XA DEHISCENCE OF OPERATIVE WOUND, INITIAL ENCOUNTER: ICD-10-CM

## 2024-09-06 DIAGNOSIS — R78.81 MSSA BACTEREMIA: ICD-10-CM

## 2024-09-06 DIAGNOSIS — R00.0 TACHYCARDIA: ICD-10-CM

## 2024-09-06 DIAGNOSIS — I73.9 PAD (PERIPHERAL ARTERY DISEASE): ICD-10-CM

## 2024-09-06 DIAGNOSIS — T81.49XA SURGICAL SITE INFECTION: ICD-10-CM

## 2024-09-06 DIAGNOSIS — B95.61 MSSA BACTEREMIA: ICD-10-CM

## 2024-09-06 DIAGNOSIS — I10 HYPERTENSION, ESSENTIAL: ICD-10-CM

## 2024-09-06 DIAGNOSIS — E78.2 MIXED HYPERLIPIDEMIA: ICD-10-CM

## 2024-09-06 PROBLEM — T81.40XA POST OP INFECTION: Status: ACTIVE | Noted: 2024-09-06

## 2024-09-06 PROBLEM — A41.9 SEPSIS: Status: ACTIVE | Noted: 2024-09-06

## 2024-09-06 LAB
ALBUMIN SERPL BCP-MCNC: 2.4 G/DL (ref 3.5–5.2)
ALLENS TEST: ABNORMAL
ALLENS TEST: ABNORMAL
ALLENS TEST: NORMAL
ALP SERPL-CCNC: 242 U/L (ref 55–135)
ALT SERPL W/O P-5'-P-CCNC: 32 U/L (ref 10–44)
ANION GAP SERPL CALC-SCNC: 13 MMOL/L (ref 8–16)
ANISOCYTOSIS BLD QL SMEAR: SLIGHT
APTT PPP: 29.9 SEC (ref 21–32)
AST SERPL-CCNC: 42 U/L (ref 10–40)
BACTERIA #/AREA URNS AUTO: NORMAL /HPF
BASOPHILS # BLD AUTO: ABNORMAL K/UL (ref 0–0.2)
BASOPHILS NFR BLD: 1 % (ref 0–1.9)
BILIRUB SERPL-MCNC: 0.6 MG/DL (ref 0.1–1)
BILIRUB UR QL STRIP: NEGATIVE
BNP SERPL-MCNC: 97 PG/ML (ref 0–99)
BUN SERPL-MCNC: 38 MG/DL (ref 8–23)
BURR CELLS BLD QL SMEAR: ABNORMAL
CALCIUM SERPL-MCNC: 9.4 MG/DL (ref 8.7–10.5)
CHLORIDE SERPL-SCNC: 95 MMOL/L (ref 95–110)
CLARITY UR REFRACT.AUTO: ABNORMAL
CO2 SERPL-SCNC: 19 MMOL/L (ref 23–29)
COLOR UR AUTO: YELLOW
CREAT SERPL-MCNC: 1.7 MG/DL (ref 0.5–1.4)
CRP SERPL-MCNC: 401.3 MG/L (ref 0–8.2)
DIFFERENTIAL METHOD BLD: ABNORMAL
DOHLE BOD BLD QL SMEAR: PRESENT
EOSINOPHIL # BLD AUTO: ABNORMAL K/UL (ref 0–0.5)
EOSINOPHIL NFR BLD: 0 % (ref 0–8)
ERYTHROCYTE [DISTWIDTH] IN BLOOD BY AUTOMATED COUNT: 14.1 % (ref 11.5–14.5)
EST. GFR  (NO RACE VARIABLE): 44.7 ML/MIN/1.73 M^2
ESTIMATED AVG GLUCOSE: 197 MG/DL (ref 68–131)
GLUCOSE SERPL-MCNC: 311 MG/DL (ref 70–110)
GLUCOSE UR QL STRIP: ABNORMAL
GRAM STN SPEC: NORMAL
GRAM STN SPEC: NORMAL
HBA1C MFR BLD: 8.5 % (ref 4–5.6)
HCO3 UR-SCNC: 20.5 MMOL/L (ref 24–28)
HCT VFR BLD AUTO: 32.8 % (ref 40–54)
HCT VFR BLD CALC: 36 %PCV (ref 36–54)
HGB BLD-MCNC: 11.2 G/DL (ref 14–18)
HGB UR QL STRIP: ABNORMAL
HYALINE CASTS UR QL AUTO: 0 /LPF
IMM GRANULOCYTES # BLD AUTO: ABNORMAL K/UL (ref 0–0.04)
IMM GRANULOCYTES NFR BLD AUTO: ABNORMAL % (ref 0–0.5)
INR PPP: 1 (ref 0.8–1.2)
KETONES UR QL STRIP: ABNORMAL
LDH SERPL L TO P-CCNC: 1.23 MMOL/L (ref 0.5–2.2)
LDH SERPL L TO P-CCNC: 2.68 MMOL/L (ref 0.5–2.2)
LEUKOCYTE ESTERASE UR QL STRIP: NEGATIVE
LYMPHOCYTES # BLD AUTO: ABNORMAL K/UL (ref 1–4.8)
LYMPHOCYTES NFR BLD: 3 % (ref 18–48)
MAGNESIUM SERPL-MCNC: 2.3 MG/DL (ref 1.6–2.6)
MCH RBC QN AUTO: 29.1 PG (ref 27–31)
MCHC RBC AUTO-ENTMCNC: 34.1 G/DL (ref 32–36)
MCV RBC AUTO: 85 FL (ref 82–98)
MICROSCOPIC COMMENT: NORMAL
MONOCYTES # BLD AUTO: ABNORMAL K/UL (ref 0.3–1)
MONOCYTES NFR BLD: 5 % (ref 4–15)
MRSA ID BY PCR: NEGATIVE
NEUTROPHILS NFR BLD: 88 % (ref 38–73)
NEUTS BAND NFR BLD MANUAL: 3 %
NITRITE UR QL STRIP: NEGATIVE
NRBC BLD-RTO: 0 /100 WBC
OHS QRS DURATION: 76 MS
OHS QTC CALCULATION: 373 MS
OSMOLALITY SERPL: 286 MOSM/KG (ref 280–300)
OSMOLALITY UR: 600 MOSM/KG (ref 50–1200)
OVALOCYTES BLD QL SMEAR: ABNORMAL
PCO2 BLDA: 38.8 MMHG (ref 35–45)
PH SMN: 7.33 [PH] (ref 7.35–7.45)
PH UR STRIP: 6 [PH] (ref 5–8)
PHOSPHATE SERPL-MCNC: 1.9 MG/DL (ref 2.7–4.5)
PLATELET # BLD AUTO: 327 K/UL (ref 150–450)
PLATELET BLD QL SMEAR: ABNORMAL
PMV BLD AUTO: 10.7 FL (ref 9.2–12.9)
PO2 BLDA: 49 MMHG (ref 40–60)
POC BE: -5 MMOL/L
POC IONIZED CALCIUM: 1.14 MMOL/L (ref 1.06–1.42)
POC SATURATED O2: 82 % (ref 95–100)
POC TCO2: 22 MMOL/L (ref 24–29)
POCT GLUCOSE: 201 MG/DL (ref 70–110)
POCT GLUCOSE: 216 MG/DL (ref 70–110)
POCT GLUCOSE: 256 MG/DL (ref 70–110)
POCT GLUCOSE: 283 MG/DL (ref 70–110)
POIKILOCYTOSIS BLD QL SMEAR: SLIGHT
POTASSIUM BLD-SCNC: 4.4 MMOL/L (ref 3.5–5.1)
POTASSIUM SERPL-SCNC: 4.4 MMOL/L (ref 3.5–5.1)
PREALB SERPL-MCNC: 3 MG/DL (ref 20–43)
PROT SERPL-MCNC: 7 G/DL (ref 6–8.4)
PROT UR QL STRIP: ABNORMAL
PROTHROMBIN TIME: 11.2 SEC (ref 9–12.5)
RBC # BLD AUTO: 3.85 M/UL (ref 4.6–6.2)
RBC #/AREA URNS AUTO: 2 /HPF (ref 0–4)
SAMPLE: ABNORMAL
SAMPLE: ABNORMAL
SAMPLE: NORMAL
SITE: ABNORMAL
SITE: ABNORMAL
SITE: NORMAL
SODIUM BLD-SCNC: 127 MMOL/L (ref 136–145)
SODIUM SERPL-SCNC: 127 MMOL/L (ref 136–145)
SODIUM UR-SCNC: 20 MMOL/L (ref 20–250)
SP GR UR STRIP: 1.03 (ref 1–1.03)
SQUAMOUS #/AREA URNS AUTO: 1 /HPF
STAPH AUREUS ID BY PCR: POSITIVE
TRANSFERRIN SERPL-MCNC: 119 MG/DL (ref 200–375)
TROPONIN I SERPL DL<=0.01 NG/ML-MCNC: <0.006 NG/ML (ref 0–0.03)
URN SPEC COLLECT METH UR: ABNORMAL
WBC # BLD AUTO: 18.68 K/UL (ref 3.9–12.7)
WBC #/AREA URNS AUTO: 0 /HPF (ref 0–5)
YEAST UR QL AUTO: NORMAL

## 2024-09-06 PROCEDURE — 63600175 PHARM REV CODE 636 W HCPCS

## 2024-09-06 PROCEDURE — 71000015 HC POSTOP RECOV 1ST HR: Performed by: ORTHOPAEDIC SURGERY

## 2024-09-06 PROCEDURE — 14021 TIS TRNFR S/A/L 10.1-30 SQCM: CPT | Mod: 78,,, | Performed by: ORTHOPAEDIC SURGERY

## 2024-09-06 PROCEDURE — 99222 1ST HOSP IP/OBS MODERATE 55: CPT | Mod: ,,, | Performed by: NURSE PRACTITIONER

## 2024-09-06 PROCEDURE — 25000003 PHARM REV CODE 250

## 2024-09-06 PROCEDURE — 63600175 PHARM REV CODE 636 W HCPCS: Performed by: ORTHOPAEDIC SURGERY

## 2024-09-06 PROCEDURE — 27000221 HC OXYGEN, UP TO 24 HOURS

## 2024-09-06 PROCEDURE — 81001 URINALYSIS AUTO W/SCOPE: CPT

## 2024-09-06 PROCEDURE — 87206 SMEAR FLUORESCENT/ACID STAI: CPT | Performed by: INTERNAL MEDICINE

## 2024-09-06 PROCEDURE — 21400001 HC TELEMETRY ROOM

## 2024-09-06 PROCEDURE — 93005 ELECTROCARDIOGRAM TRACING: CPT

## 2024-09-06 PROCEDURE — 64447 NJX AA&/STRD FEMORAL NRV IMG: CPT

## 2024-09-06 PROCEDURE — 36000707: Performed by: ORTHOPAEDIC SURGERY

## 2024-09-06 PROCEDURE — 82962 GLUCOSE BLOOD TEST: CPT | Performed by: ORTHOPAEDIC SURGERY

## 2024-09-06 PROCEDURE — 99499 UNLISTED E&M SERVICE: CPT | Mod: ,,, | Performed by: NURSE PRACTITIONER

## 2024-09-06 PROCEDURE — 87075 CULTR BACTERIA EXCEPT BLOOD: CPT | Performed by: INTERNAL MEDICINE

## 2024-09-06 PROCEDURE — 83605 ASSAY OF LACTIC ACID: CPT

## 2024-09-06 PROCEDURE — 87186 SC STD MICRODIL/AGAR DIL: CPT

## 2024-09-06 PROCEDURE — 85610 PROTHROMBIN TIME: CPT

## 2024-09-06 PROCEDURE — 99900035 HC TECH TIME PER 15 MIN (STAT)

## 2024-09-06 PROCEDURE — 25000003 PHARM REV CODE 250: Performed by: STUDENT IN AN ORGANIZED HEALTH CARE EDUCATION/TRAINING PROGRAM

## 2024-09-06 PROCEDURE — 99285 EMERGENCY DEPT VISIT HI MDM: CPT | Mod: 25

## 2024-09-06 PROCEDURE — 87150 DNA/RNA AMPLIFIED PROBE: CPT

## 2024-09-06 PROCEDURE — 63600175 PHARM REV CODE 636 W HCPCS: Performed by: NURSE PRACTITIONER

## 2024-09-06 PROCEDURE — 87015 SPECIMEN INFECT AGNT CONCNTJ: CPT | Performed by: INTERNAL MEDICINE

## 2024-09-06 PROCEDURE — D9220A PRA ANESTHESIA: Mod: CRNA,,, | Performed by: STUDENT IN AN ORGANIZED HEALTH CARE EDUCATION/TRAINING PROGRAM

## 2024-09-06 PROCEDURE — 86140 C-REACTIVE PROTEIN: CPT

## 2024-09-06 PROCEDURE — 87070 CULTURE OTHR SPECIMN AEROBIC: CPT | Performed by: INTERNAL MEDICINE

## 2024-09-06 PROCEDURE — 87040 BLOOD CULTURE FOR BACTERIA: CPT

## 2024-09-06 PROCEDURE — 15002 WOUND PREP TRK/ARM/LEG: CPT | Mod: 78,,, | Performed by: ORTHOPAEDIC SURGERY

## 2024-09-06 PROCEDURE — 63600175 PHARM REV CODE 636 W HCPCS: Mod: JZ,JG | Performed by: STUDENT IN AN ORGANIZED HEALTH CARE EDUCATION/TRAINING PROGRAM

## 2024-09-06 PROCEDURE — 83735 ASSAY OF MAGNESIUM: CPT

## 2024-09-06 PROCEDURE — 83036 HEMOGLOBIN GLYCOSYLATED A1C: CPT

## 2024-09-06 PROCEDURE — 0JBQ0ZZ EXCISION OF RIGHT FOOT SUBCUTANEOUS TISSUE AND FASCIA, OPEN APPROACH: ICD-10-PCS | Performed by: ORTHOPAEDIC SURGERY

## 2024-09-06 PROCEDURE — 2W1SX6Z COMPRESSION OF RIGHT FOOT USING PRESSURE DRESSING: ICD-10-PCS | Performed by: ORTHOPAEDIC SURGERY

## 2024-09-06 PROCEDURE — 37000009 HC ANESTHESIA EA ADD 15 MINS: Performed by: ORTHOPAEDIC SURGERY

## 2024-09-06 PROCEDURE — 83930 ASSAY OF BLOOD OSMOLALITY: CPT | Performed by: INTERNAL MEDICINE

## 2024-09-06 PROCEDURE — 96365 THER/PROPH/DIAG IV INF INIT: CPT

## 2024-09-06 PROCEDURE — 87077 CULTURE AEROBIC IDENTIFY: CPT

## 2024-09-06 PROCEDURE — 25000003 PHARM REV CODE 250: Performed by: NURSE ANESTHETIST, CERTIFIED REGISTERED

## 2024-09-06 PROCEDURE — 64445 NJX AA&/STRD SCIATIC NRV IMG: CPT

## 2024-09-06 PROCEDURE — 87102 FUNGUS ISOLATION CULTURE: CPT | Performed by: INTERNAL MEDICINE

## 2024-09-06 PROCEDURE — 63600175 PHARM REV CODE 636 W HCPCS: Performed by: STUDENT IN AN ORGANIZED HEALTH CARE EDUCATION/TRAINING PROGRAM

## 2024-09-06 PROCEDURE — 80053 COMPREHEN METABOLIC PANEL: CPT

## 2024-09-06 PROCEDURE — 87205 SMEAR GRAM STAIN: CPT | Performed by: INTERNAL MEDICINE

## 2024-09-06 PROCEDURE — 63600175 PHARM REV CODE 636 W HCPCS: Performed by: ANESTHESIOLOGY

## 2024-09-06 PROCEDURE — 63600175 PHARM REV CODE 636 W HCPCS: Performed by: INTERNAL MEDICINE

## 2024-09-06 PROCEDURE — 25000003 PHARM REV CODE 250: Performed by: NURSE PRACTITIONER

## 2024-09-06 PROCEDURE — 36000706: Performed by: ORTHOPAEDIC SURGERY

## 2024-09-06 PROCEDURE — 63600175 PHARM REV CODE 636 W HCPCS: Performed by: NURSE ANESTHETIST, CERTIFIED REGISTERED

## 2024-09-06 PROCEDURE — 96375 TX/PRO/DX INJ NEW DRUG ADDON: CPT

## 2024-09-06 PROCEDURE — 99223 1ST HOSP IP/OBS HIGH 75: CPT | Mod: 57,,, | Performed by: ORTHOPAEDIC SURGERY

## 2024-09-06 PROCEDURE — 87077 CULTURE AEROBIC IDENTIFY: CPT | Mod: 59 | Performed by: INTERNAL MEDICINE

## 2024-09-06 PROCEDURE — 85027 COMPLETE CBC AUTOMATED: CPT

## 2024-09-06 PROCEDURE — 87116 MYCOBACTERIA CULTURE: CPT | Performed by: INTERNAL MEDICINE

## 2024-09-06 PROCEDURE — 25000003 PHARM REV CODE 250: Performed by: INTERNAL MEDICINE

## 2024-09-06 PROCEDURE — 83935 ASSAY OF URINE OSMOLALITY: CPT

## 2024-09-06 PROCEDURE — 84134 ASSAY OF PREALBUMIN: CPT

## 2024-09-06 PROCEDURE — 37000008 HC ANESTHESIA 1ST 15 MINUTES: Performed by: ORTHOPAEDIC SURGERY

## 2024-09-06 PROCEDURE — 83880 ASSAY OF NATRIURETIC PEPTIDE: CPT

## 2024-09-06 PROCEDURE — 71000016 HC POSTOP RECOV ADDL HR: Performed by: ORTHOPAEDIC SURGERY

## 2024-09-06 PROCEDURE — 94761 N-INVAS EAR/PLS OXIMETRY MLT: CPT | Mod: XB

## 2024-09-06 PROCEDURE — 93010 ELECTROCARDIOGRAM REPORT: CPT | Mod: ,,, | Performed by: INTERNAL MEDICINE

## 2024-09-06 PROCEDURE — D9220A PRA ANESTHESIA: Mod: ANES,,, | Performed by: ANESTHESIOLOGY

## 2024-09-06 PROCEDURE — 82803 BLOOD GASES ANY COMBINATION: CPT

## 2024-09-06 PROCEDURE — 84466 ASSAY OF TRANSFERRIN: CPT

## 2024-09-06 PROCEDURE — 85007 BL SMEAR W/DIFF WBC COUNT: CPT

## 2024-09-06 PROCEDURE — 87147 CULTURE TYPE IMMUNOLOGIC: CPT | Performed by: INTERNAL MEDICINE

## 2024-09-06 PROCEDURE — 84100 ASSAY OF PHOSPHORUS: CPT

## 2024-09-06 PROCEDURE — 84300 ASSAY OF URINE SODIUM: CPT

## 2024-09-06 PROCEDURE — 85730 THROMBOPLASTIN TIME PARTIAL: CPT

## 2024-09-06 PROCEDURE — 87186 SC STD MICRODIL/AGAR DIL: CPT | Mod: 59 | Performed by: INTERNAL MEDICINE

## 2024-09-06 PROCEDURE — 84484 ASSAY OF TROPONIN QUANT: CPT

## 2024-09-06 PROCEDURE — 82962 GLUCOSE BLOOD TEST: CPT

## 2024-09-06 PROCEDURE — 71000033 HC RECOVERY, INTIAL HOUR: Performed by: ORTHOPAEDIC SURGERY

## 2024-09-06 PROCEDURE — 27201423 OPTIME MED/SURG SUP & DEVICES STERILE SUPPLY: Performed by: ORTHOPAEDIC SURGERY

## 2024-09-06 RX ORDER — MIDAZOLAM HYDROCHLORIDE 1 MG/ML
.5-4 INJECTION, SOLUTION INTRAMUSCULAR; INTRAVENOUS
Status: DISCONTINUED | OUTPATIENT
Start: 2024-09-06 | End: 2024-09-06 | Stop reason: HOSPADM

## 2024-09-06 RX ORDER — PHENYLEPHRINE HYDROCHLORIDE 10 MG/ML
100 INJECTION INTRAVENOUS EVERY 10 MIN PRN
Status: COMPLETED | OUTPATIENT
Start: 2024-09-06 | End: 2024-09-06

## 2024-09-06 RX ORDER — GLUCAGON 1 MG
1 KIT INJECTION
Status: DISCONTINUED | OUTPATIENT
Start: 2024-09-06 | End: 2024-10-04 | Stop reason: HOSPADM

## 2024-09-06 RX ORDER — GLUCAGON 1 MG
1 KIT INJECTION
Status: DISCONTINUED | OUTPATIENT
Start: 2024-09-06 | End: 2024-09-06 | Stop reason: HOSPADM

## 2024-09-06 RX ORDER — MUPIROCIN 20 MG/G
OINTMENT TOPICAL
Status: DISCONTINUED | OUTPATIENT
Start: 2024-09-06 | End: 2024-09-06 | Stop reason: HOSPADM

## 2024-09-06 RX ORDER — CLINDAMYCIN PHOSPHATE 900 MG/50ML
INJECTION, SOLUTION INTRAVENOUS
Status: DISCONTINUED | OUTPATIENT
Start: 2024-09-06 | End: 2024-09-06

## 2024-09-06 RX ORDER — PHENYLEPHRINE HYDROCHLORIDE 10 MG/ML
100 INJECTION INTRAVENOUS EVERY 10 MIN PRN
Status: DISCONTINUED | OUTPATIENT
Start: 2024-09-06 | End: 2024-09-08

## 2024-09-06 RX ORDER — SODIUM CHLORIDE 0.9 % (FLUSH) 0.9 %
3 SYRINGE (ML) INJECTION
Status: DISCONTINUED | OUTPATIENT
Start: 2024-09-06 | End: 2024-09-06 | Stop reason: HOSPADM

## 2024-09-06 RX ORDER — PHENYLEPHRINE HYDROCHLORIDE 10 MG/ML
INJECTION INTRAVENOUS
Status: DISCONTINUED | OUTPATIENT
Start: 2024-09-06 | End: 2024-09-06

## 2024-09-06 RX ORDER — BUPIVACAINE HYDROCHLORIDE 5 MG/ML
INJECTION, SOLUTION EPIDURAL; INTRACAUDAL
Status: COMPLETED | OUTPATIENT
Start: 2024-09-06 | End: 2024-09-06

## 2024-09-06 RX ORDER — INSULIN ASPART 100 [IU]/ML
0-5 INJECTION, SOLUTION INTRAVENOUS; SUBCUTANEOUS EVERY 6 HOURS PRN
Status: DISCONTINUED | OUTPATIENT
Start: 2024-09-06 | End: 2024-09-06

## 2024-09-06 RX ORDER — SODIUM CHLORIDE 0.9 % (FLUSH) 0.9 %
10 SYRINGE (ML) INJECTION
Status: DISCONTINUED | OUTPATIENT
Start: 2024-09-06 | End: 2024-09-06 | Stop reason: HOSPADM

## 2024-09-06 RX ORDER — INSULIN ASPART 100 [IU]/ML
0-10 INJECTION, SOLUTION INTRAVENOUS; SUBCUTANEOUS
Status: DISCONTINUED | OUTPATIENT
Start: 2024-09-06 | End: 2024-09-06

## 2024-09-06 RX ORDER — IBUPROFEN 200 MG
24 TABLET ORAL
Status: DISCONTINUED | OUTPATIENT
Start: 2024-09-06 | End: 2024-10-04 | Stop reason: HOSPADM

## 2024-09-06 RX ORDER — LABETALOL HYDROCHLORIDE 5 MG/ML
INJECTION, SOLUTION INTRAVENOUS
Status: DISCONTINUED | OUTPATIENT
Start: 2024-09-06 | End: 2024-09-06

## 2024-09-06 RX ORDER — IBUPROFEN 200 MG
16 TABLET ORAL
Status: DISCONTINUED | OUTPATIENT
Start: 2024-09-06 | End: 2024-10-04 | Stop reason: HOSPADM

## 2024-09-06 RX ORDER — INSULIN GLARGINE 100 [IU]/ML
5 INJECTION, SOLUTION SUBCUTANEOUS DAILY
Status: DISCONTINUED | OUTPATIENT
Start: 2024-09-06 | End: 2024-09-06

## 2024-09-06 RX ORDER — SODIUM CHLORIDE 9 MG/ML
INJECTION, SOLUTION INTRAVENOUS CONTINUOUS
Status: DISCONTINUED | OUTPATIENT
Start: 2024-09-06 | End: 2024-09-07

## 2024-09-06 RX ORDER — NORTRIPTYLINE HYDROCHLORIDE 25 MG/1
50 CAPSULE ORAL NIGHTLY
Status: DISCONTINUED | OUTPATIENT
Start: 2024-09-06 | End: 2024-10-04 | Stop reason: HOSPADM

## 2024-09-06 RX ORDER — ATORVASTATIN CALCIUM 40 MG/1
40 TABLET, FILM COATED ORAL DAILY
Status: DISCONTINUED | OUTPATIENT
Start: 2024-09-06 | End: 2024-09-12

## 2024-09-06 RX ORDER — IBUPROFEN 200 MG
16 TABLET ORAL
Status: DISCONTINUED | OUTPATIENT
Start: 2024-09-06 | End: 2024-09-06

## 2024-09-06 RX ORDER — VANCOMYCIN HYDROCHLORIDE 1 G/20ML
INJECTION, POWDER, LYOPHILIZED, FOR SOLUTION INTRAVENOUS
Status: DISCONTINUED | OUTPATIENT
Start: 2024-09-06 | End: 2024-09-06 | Stop reason: HOSPADM

## 2024-09-06 RX ORDER — GABAPENTIN 400 MG/1
1200 CAPSULE ORAL NIGHTLY
Status: DISCONTINUED | OUTPATIENT
Start: 2024-09-06 | End: 2024-09-12

## 2024-09-06 RX ORDER — INSULIN ASPART 100 [IU]/ML
0-15 INJECTION, SOLUTION INTRAVENOUS; SUBCUTANEOUS
Status: DISCONTINUED | OUTPATIENT
Start: 2024-09-06 | End: 2024-09-11

## 2024-09-06 RX ORDER — FENTANYL CITRATE 50 UG/ML
25 INJECTION, SOLUTION INTRAMUSCULAR; INTRAVENOUS EVERY 5 MIN PRN
Status: DISCONTINUED | OUTPATIENT
Start: 2024-09-06 | End: 2024-09-06 | Stop reason: HOSPADM

## 2024-09-06 RX ORDER — SODIUM CHLORIDE, SODIUM LACTATE, POTASSIUM CHLORIDE, CALCIUM CHLORIDE 600; 310; 30; 20 MG/100ML; MG/100ML; MG/100ML; MG/100ML
INJECTION, SOLUTION INTRAVENOUS CONTINUOUS
Status: ACTIVE | OUTPATIENT
Start: 2024-09-06 | End: 2024-09-06

## 2024-09-06 RX ORDER — PHENYLEPHRINE HCL IN 0.9% NACL 1 MG/10 ML
SYRINGE (ML) INTRAVENOUS
Status: COMPLETED
Start: 2024-09-06 | End: 2024-09-06

## 2024-09-06 RX ORDER — INSULIN GLARGINE 100 [IU]/ML
40 INJECTION, SOLUTION SUBCUTANEOUS NIGHTLY
Status: DISCONTINUED | OUTPATIENT
Start: 2024-09-06 | End: 2024-09-08

## 2024-09-06 RX ORDER — FENTANYL CITRATE 50 UG/ML
INJECTION, SOLUTION INTRAMUSCULAR; INTRAVENOUS
Status: DISCONTINUED | OUTPATIENT
Start: 2024-09-06 | End: 2024-09-06

## 2024-09-06 RX ORDER — CLINDAMYCIN PHOSPHATE 900 MG/50ML
900 INJECTION, SOLUTION INTRAVENOUS
Status: DISCONTINUED | OUTPATIENT
Start: 2024-09-06 | End: 2024-09-06

## 2024-09-06 RX ORDER — IBUPROFEN 200 MG
24 TABLET ORAL
Status: DISCONTINUED | OUTPATIENT
Start: 2024-09-06 | End: 2024-09-06

## 2024-09-06 RX ORDER — ENOXAPARIN SODIUM 100 MG/ML
40 INJECTION SUBCUTANEOUS EVERY 24 HOURS
Status: DISCONTINUED | OUTPATIENT
Start: 2024-09-06 | End: 2024-09-09

## 2024-09-06 RX ORDER — GABAPENTIN 300 MG/1
600 CAPSULE ORAL DAILY
Status: DISCONTINUED | OUTPATIENT
Start: 2024-09-06 | End: 2024-09-12

## 2024-09-06 RX ORDER — DEXMEDETOMIDINE HYDROCHLORIDE 100 UG/ML
INJECTION, SOLUTION INTRAVENOUS
Status: DISCONTINUED | OUTPATIENT
Start: 2024-09-06 | End: 2024-09-06

## 2024-09-06 RX ORDER — ONDANSETRON HYDROCHLORIDE 2 MG/ML
INJECTION, SOLUTION INTRAVENOUS
Status: DISCONTINUED | OUTPATIENT
Start: 2024-09-06 | End: 2024-09-06

## 2024-09-06 RX ORDER — GLUCAGON 1 MG
1 KIT INJECTION
Status: DISCONTINUED | OUTPATIENT
Start: 2024-09-06 | End: 2024-09-06

## 2024-09-06 RX ORDER — ACETAMINOPHEN 500 MG
1000 TABLET ORAL ONCE
Status: COMPLETED | OUTPATIENT
Start: 2024-09-06 | End: 2024-09-06

## 2024-09-06 RX ORDER — INSULIN ASPART 100 [IU]/ML
14 INJECTION, SOLUTION INTRAVENOUS; SUBCUTANEOUS
Status: DISCONTINUED | OUTPATIENT
Start: 2024-09-07 | End: 2024-09-10

## 2024-09-06 RX ORDER — LIDOCAINE HYDROCHLORIDE 20 MG/ML
INJECTION INTRAVENOUS
Status: DISCONTINUED | OUTPATIENT
Start: 2024-09-06 | End: 2024-09-06

## 2024-09-06 RX ORDER — CEFAZOLIN SODIUM 1 G/3ML
INJECTION, POWDER, FOR SOLUTION INTRAMUSCULAR; INTRAVENOUS
Status: DISCONTINUED | OUTPATIENT
Start: 2024-09-06 | End: 2024-09-06

## 2024-09-06 RX ORDER — PROPOFOL 10 MG/ML
VIAL (ML) INTRAVENOUS CONTINUOUS PRN
Status: DISCONTINUED | OUTPATIENT
Start: 2024-09-06 | End: 2024-09-06

## 2024-09-06 RX ORDER — EMTRICITABINE AND TENOFOVIR DISOPROXIL FUMARATE 200; 300 MG/1; MG/1
1 TABLET, FILM COATED ORAL DAILY
Status: DISCONTINUED | OUTPATIENT
Start: 2024-09-06 | End: 2024-09-12

## 2024-09-06 RX ORDER — FENTANYL CITRATE 50 UG/ML
25-200 INJECTION, SOLUTION INTRAMUSCULAR; INTRAVENOUS
Status: DISCONTINUED | OUTPATIENT
Start: 2024-09-06 | End: 2024-09-06 | Stop reason: HOSPADM

## 2024-09-06 RX ADMIN — INSULIN ASPART 9 UNITS: 100 INJECTION, SOLUTION INTRAVENOUS; SUBCUTANEOUS at 09:09

## 2024-09-06 RX ADMIN — PHENYLEPHRINE HYDROCHLORIDE 200 MCG: 10 INJECTION INTRAVENOUS at 05:09

## 2024-09-06 RX ADMIN — GABAPENTIN 600 MG: 300 CAPSULE ORAL at 09:09

## 2024-09-06 RX ADMIN — TACROLIMUS 900 MG: 0.5 CAPSULE ORAL at 02:09

## 2024-09-06 RX ADMIN — ACETAMINOPHEN 1000 MG: 500 TABLET ORAL at 10:09

## 2024-09-06 RX ADMIN — FENTANYL CITRATE 25 MCG: 50 INJECTION, SOLUTION INTRAMUSCULAR; INTRAVENOUS at 04:09

## 2024-09-06 RX ADMIN — FENTANYL CITRATE 25 MCG: 50 INJECTION, SOLUTION INTRAMUSCULAR; INTRAVENOUS at 03:09

## 2024-09-06 RX ADMIN — INSULIN ASPART 3 UNITS: 100 INJECTION, SOLUTION INTRAVENOUS; SUBCUTANEOUS at 05:09

## 2024-09-06 RX ADMIN — BUPIVACAINE HYDROCHLORIDE 25 ML: 5 INJECTION, SOLUTION EPIDURAL; INTRACAUDAL; PERINEURAL at 01:09

## 2024-09-06 RX ADMIN — ATORVASTATIN CALCIUM 40 MG: 40 TABLET, FILM COATED ORAL at 09:09

## 2024-09-06 RX ADMIN — DEXMEDETOMIDINE 8 MCG: 100 INJECTION, SOLUTION, CONCENTRATE INTRAVENOUS at 04:09

## 2024-09-06 RX ADMIN — ONDANSETRON 4 MG: 2 INJECTION INTRAMUSCULAR; INTRAVENOUS at 02:09

## 2024-09-06 RX ADMIN — DEXMEDETOMIDINE 4 MCG: 100 INJECTION, SOLUTION, CONCENTRATE INTRAVENOUS at 02:09

## 2024-09-06 RX ADMIN — MIDAZOLAM 1 MG: 1 INJECTION INTRAMUSCULAR; INTRAVENOUS at 01:09

## 2024-09-06 RX ADMIN — CLINDAMYCIN PHOSPHATE 900 MG: 900 INJECTION, SOLUTION INTRAVENOUS at 03:09

## 2024-09-06 RX ADMIN — PROPOFOL 75 MCG/KG/MIN: 10 INJECTION, EMULSION INTRAVENOUS at 02:09

## 2024-09-06 RX ADMIN — SODIUM CHLORIDE: 0.9 INJECTION, SOLUTION INTRAVENOUS at 01:09

## 2024-09-06 RX ADMIN — PHENYLEPHRINE HYDROCHLORIDE 100 MCG: 10 INJECTION INTRAVENOUS at 05:09

## 2024-09-06 RX ADMIN — Medication 100 MCG: at 05:09

## 2024-09-06 RX ADMIN — CEFEPIME 1 G: 1 INJECTION, POWDER, FOR SOLUTION INTRAMUSCULAR; INTRAVENOUS at 09:09

## 2024-09-06 RX ADMIN — GABAPENTIN 1200 MG: 400 CAPSULE ORAL at 09:09

## 2024-09-06 RX ADMIN — INSULIN GLARGINE 40 UNITS: 100 INJECTION, SOLUTION SUBCUTANEOUS at 09:09

## 2024-09-06 RX ADMIN — LABETALOL HYDROCHLORIDE 5 MG: 5 INJECTION, SOLUTION INTRAVENOUS at 04:09

## 2024-09-06 RX ADMIN — INSULIN GLARGINE 5 UNITS: 100 INJECTION, SOLUTION SUBCUTANEOUS at 10:09

## 2024-09-06 RX ADMIN — SODIUM CHLORIDE, SODIUM GLUCONATE, SODIUM ACETATE, POTASSIUM CHLORIDE, MAGNESIUM CHLORIDE, SODIUM PHOSPHATE, DIBASIC, AND POTASSIUM PHOSPHATE: .53; .5; .37; .037; .03; .012; .00082 INJECTION, SOLUTION INTRAVENOUS at 02:09

## 2024-09-06 RX ADMIN — FENTANYL CITRATE 50 MCG: 50 INJECTION, SOLUTION INTRAMUSCULAR; INTRAVENOUS at 01:09

## 2024-09-06 RX ADMIN — PHENYLEPHRINE HYDROCHLORIDE 100 MCG: 10 INJECTION INTRAVENOUS at 04:09

## 2024-09-06 RX ADMIN — SODIUM CHLORIDE 2790 ML: 9 INJECTION, SOLUTION INTRAVENOUS at 02:09

## 2024-09-06 RX ADMIN — LIDOCAINE HYDROCHLORIDE 40 MG: 20 INJECTION INTRAVENOUS at 02:09

## 2024-09-06 RX ADMIN — CEFAZOLIN 2 G: 330 INJECTION, POWDER, FOR SOLUTION INTRAMUSCULAR; INTRAVENOUS at 02:09

## 2024-09-06 RX ADMIN — PHENYLEPHRINE HYDROCHLORIDE 50 MCG: 10 INJECTION INTRAVENOUS at 04:09

## 2024-09-06 RX ADMIN — SODIUM CHLORIDE, POTASSIUM CHLORIDE, SODIUM LACTATE AND CALCIUM CHLORIDE: 600; 310; 30; 20 INJECTION, SOLUTION INTRAVENOUS at 10:09

## 2024-09-06 RX ADMIN — CEFEPIME 2 G: 2 INJECTION, POWDER, FOR SOLUTION INTRAVENOUS at 09:09

## 2024-09-06 RX ADMIN — BUPIVACAINE HYDROCHLORIDE 15 ML: 5 INJECTION, SOLUTION EPIDURAL; INTRACAUDAL at 01:09

## 2024-09-06 RX ADMIN — VANCOMYCIN HYDROCHLORIDE 2250 MG: 1 INJECTION, POWDER, LYOPHILIZED, FOR SOLUTION INTRAVENOUS at 03:09

## 2024-09-06 RX ADMIN — NORTRIPTYLINE HYDROCHLORIDE 50 MG: 25 CAPSULE ORAL at 09:09

## 2024-09-06 RX ADMIN — DEXMEDETOMIDINE 8 MCG: 100 INJECTION, SOLUTION, CONCENTRATE INTRAVENOUS at 03:09

## 2024-09-06 NOTE — PROGRESS NOTES
Pharmacist Renal Dose Adjustment Note    Cortes Schroeder is a 64 y.o. male being treated with the medication Cefepime    Patient Data:    Vital Signs (Most Recent):  Temp: 99.1 °F (37.3 °C) (09/06/24 0605)  Pulse: (!) 111 (09/06/24 0605)  Resp: 20 (09/06/24 0605)  BP: (!) 150/73 (09/06/24 0605)  SpO2: 95 % (09/06/24 0605) Vital Signs (72h Range):  Temp:  [98 °F (36.7 °C)-99.1 °F (37.3 °C)]   Pulse:  [104-111]   Resp:  [17-62]   BP: (104-150)/(50-83)   SpO2:  [93 %-100 %]      Recent Labs   Lab 09/06/24  0235   CREATININE 1.7*     Serum creatinine: 1.7 mg/dL (H) 09/06/24 0235  Estimated creatinine clearance: 46.6 mL/min (A)    Cefepime 1 g Q8H will be changed to Cefepime 1 g Q12H    Pharmacist's Name: Sharon Salinas  Pharmacist's Extension: [phone]

## 2024-09-06 NOTE — ASSESSMENT & PLAN NOTE
DEEPALI is likely due to acute tubular necrosis caused by sepsis . Baseline creatinine is  1 . Most recent creatinine and eGFR are listed below.  Recent Labs     09/06/24  0235   CREATININE 1.7*   EGFRNORACEVR 44.7*      Plan  - DEEPALI is worsening. Will continue current treatment  - Avoid nephrotoxins and renally dose meds for GFR listed above  - Monitor urine output, serial BMP, and adjust therapy as needed

## 2024-09-06 NOTE — ANESTHESIA PROCEDURE NOTES
Right Saphenous SS    Patient location during procedure: pre-op   Block not for primary anesthetic.  Reason for block: at surgeon's request and post-op pain management   Post-op Pain Location: Right leg pain   Start time: 9/6/2024 1:41 PM  Timeout: 9/6/2024 1:40 PM   End time: 9/6/2024 1:50 PM    Staffing  Authorizing Provider: Bernarda Caro MD  Performing Provider: Bernarda Caro MD    Staffing  Performed by: Bernarda Caro MD  Authorized by: Bernarda Caro MD    Preanesthetic Checklist  Completed: patient identified, IV checked, site marked, risks and benefits discussed, surgical consent, monitors and equipment checked, pre-op evaluation and timeout performed  Peripheral Block  Patient position: supine  Prep: ChloraPrep  Patient monitoring: heart rate, cardiac monitor, continuous pulse ox, continuous capnometry and frequent blood pressure checks  Block type: saphenous  Laterality: right  Injection technique: single shot  Needle  Needle type: Echogenic   Needle gauge: 20 G  Needle length: 4 in  Needle localization: anatomical landmarks and ultrasound guidance   -ultrasound image captured on disc.  Assessment  Injection assessment: negative aspiration, negative parasthesia and local visualized surrounding nerve  Paresthesia pain: none  Heart rate change: no  Slow fractionated injection: yes  Pain Tolerance: comfortable throughout block  Medications:    Medications: bupivacaine (pf) (MARCAINE) injection 0.5% - Perineural, Other   15 mL - 9/6/2024 1:50:00 PM    Additional Notes  VSS.  [facility] RN monitoring vitals throughout procedure.  Patient tolerated procedure well.

## 2024-09-06 NOTE — ED PROVIDER NOTES
Encounter Date: 9/6/2024       History     Chief Complaint   Patient presents with    Altered Mental Status     Had orthopedic surgery on right leg, redness and swelling noted to surgical site. According to family, the pt is not acting himself, fidgety, and has not eaten in 3 days.      Cortes Schroeder is a 64 y.o. male who has a past medical history of Anxiety (12/18/2012), Back pain (12/18/2012), Cataract, Chronic pain syndrome (04/24/2016), Diabetes type 2, controlled (02/20/2016), Diabetic retinopathy, DM (diabetes mellitus) (12/18/2012), DM (diabetes mellitus), type 2, uncontrolled (11/16/2013), Gastroesophageal reflux disease without esophagitis (02/20/2016), Hyperlipidemia (12/18/2012), Insomnia (08/07/2014), and Neuropathy (11/16/2013).    The patient presents to the ED due to altered mental status for the past 6 hours and worsening infection of the right ankle postoperatively.   Patient's son reports symptoms started late last night when patient became markedly confused and nonsensical.  Patient's son attests to recent surgery to his right ankle for an ankle fracture.  Upon chart review patient underwent ORIF with hardware implementation of right ankle at [facility] facility with Orthopedic surgery on 07/26.  Patient denies headache, chest pain, shortness of breath, abdominal pain.      The history is provided by the patient and a relative. The history is limited by the condition of the patient.     Review of patient's allergies indicates:   Allergen Reactions    Invokana [canagliflozin] Anaphylaxis    Percocet [oxycodone-acetaminophen] Nausea Only and Hallucinations    Biaxin [clarithromycin]     Hydrocodone Other (See Comments)     Dizzy/nausea/hallucinations    Sulfa (sulfonamide antibiotics) Nausea Only and Rash     Past Medical History:   Diagnosis Date    Anxiety 12/18/2012    Back pain 12/18/2012    Cataract     Chronic pain syndrome 04/24/2016    Diabetes type 2, controlled 02/20/2016    Diabetic  retinopathy     DM (diabetes mellitus) 12/18/2012    DM (diabetes mellitus), type 2, uncontrolled 11/16/2013    Gastroesophageal reflux disease without esophagitis 02/20/2016    Hyperlipidemia 12/18/2012    Insomnia 08/07/2014    Neuropathy 11/16/2013     Past Surgical History:   Procedure Laterality Date    APPLICATION, EXTERNAL FIXATION DEVICE, FOR ANKLE FRACTURE Right 7/18/2024    Procedure: APPLICATION, EXTERNAL FIXATION DEVICE, FOR ANKLE FRACTURE;  Surgeon: Moe Liz MD;  Location: [facility] OR [address] [place] [address];  Service: Orthopedics;  Laterality: Right;    BACK SURGERY  2003    Lumbar Spine    CATARACT EXTRACTION      CLOSED REDUCTION, FRACTURE, ANKLE, TRIMALLEOLAR Right 7/18/2024    Procedure: CLOSED REDUCTION, FRACTURE, ANKLE, TRIMALLEOLAR;  Surgeon: Moe Liz MD;  Location: [facility] OR [address] [place] [address];  Service: Orthopedics;  Laterality: Right;    EPIDURAL STEROID INJECTION N/A 1/16/2019    Procedure: Injection, Steroid, Epidural Cervical;  Surgeon: Andrew Sinclair Jr., MD;  Location: [facility] ENDO;  Service: Pain Management;  Laterality: N/A;  Cervical Epidural Steroid Injection C7-T1    65005    Arrive @ 1240    EPIDURAL STEROID INJECTION N/A 2/20/2019    Procedure: Injection, Steroid, Epidural;  Surgeon: Andrew Sinclair Jr., MD;  Location: [facility] ENDO;  Service: Pain Management;  Laterality: N/A;  Lumbar Epidural Steroid Injection L4-5    37631    Arrive @ 1215 (requests latest time)    FIXATION OF SYNDESMOSIS OF ANKLE Right 7/26/2024    Procedure: FIXATION, SYNDESMOSIS, ANKLE;  Surgeon: Moe Liz MD;  Location: [address];  Service: Orthopedics;  Laterality: Right;    INJECTION, SPINE, LUMBOSACRAL, TRANSFORAMINAL APPROACH Bilateral 6/14/2024    Procedure: Bilateral L5 Transforaminal Epidural Steroid Injections;  Surgeon: Andrew Sinclair Jr., MD;  Location: [facility] PAIN MANAGEMENT;  Service: Pain Management;  Laterality: Bilateral;  @1300(given)  ASA last 6/8  Check BG  MD Sign.    OPEN  REDUCTION AND INTERNAL FIXATION (ORIF) OF FRACTURE OF DISTAL RADIUS Left 7/19/2024    Procedure: ORIF, FRACTURE, RADIUS, DISTAL - LEFT;  Surgeon: Moe Liz MD;  Location: [facility] OR [address] [place] [address];  Service: Orthopedics;  Laterality: Left;  LEFT, SYNTHES, C ARM    OPEN REDUCTION AND INTERNAL FIXATION (ORIF) OF INJURY OF ANKLE Right 7/26/2024    Procedure: ORIF, ANKLE;  Surgeon: Moe Liz MD;  Location: [facility] OR [address] [place] [address];  Service: Orthopedics;  Laterality: Right;    REMOVAL OF EXTERNAL FIXATION DEVICE Right 7/26/2024    Procedure: REMOVAL, EXTERNAL FIXATION DEVICE;  Surgeon: Moe Liz MD;  Location: [facility] OR [address] [place] [address];  Service: Orthopedics;  Laterality: Right;     Family History   Problem Relation Name Age of Onset    Cataracts Father      Hypertension Father      Parkinsonism Father      Alzheimer's disease Father      Migraines Mother      Hypertension Mother      Heart attack Mother      Amblyopia Neg Hx      Blindness Neg Hx      Glaucoma Neg Hx      Macular degeneration Neg Hx      Retinal detachment Neg Hx      Strabismus Neg Hx       Social History     Tobacco Use    Smoking status: Never    Smokeless tobacco: Never   Substance Use Topics    Alcohol use: Yes     Alcohol/week: 1.0 standard drink of alcohol     Types: 1 Glasses of wine per week     Comment: occasionally    Drug use: No     Review of Systems   All other systems reviewed and are negative.      Physical Exam     Initial Vitals [09/06/24 0154]   BP Pulse Resp Temp SpO2   (!) 104/50 110 18 98.7 °F (37.1 °C) 97 %      MAP       --         Physical Exam    Nursing note and vitals reviewed.  Constitutional: He appears well-developed and well-nourished. He is not diaphoretic. He appears distressed.   HENT:   Head: Normocephalic and atraumatic.   Eyes: Conjunctivae and EOM are normal. Pupils are equal, round, and reactive to light.   Neck: Neck supple.   Normal range of motion.  Cardiovascular:  Normal rate, normal heart sounds and intact  distal pulses.           Pulmonary/Chest: Breath sounds normal.   Abdominal: Abdomen is soft. He exhibits no distension and no mass. There is no abdominal tenderness. There is no rebound and no guarding.   Musculoskeletal:         General: Normal range of motion.      Cervical back: Normal range of motion and neck supple.     Neurological: He is alert. GCS score is 15. GCS eye subscore is 4. GCS verbal subscore is 5. GCS motor subscore is 6.   Skin: Skin is warm and dry. Capillary refill takes less than 2 seconds.   Purulent and serous drainage from right ankle incision with eschar of the lateral aspect. Please see media section    Psychiatric: He has a normal mood and affect. His behavior is normal. Judgment and thought content normal.         ED Course   Procedures  Labs Reviewed   CBC W/ AUTO DIFFERENTIAL - Abnormal       Result Value    WBC 18.68 (*)     RBC 3.85 (*)     Hemoglobin 11.2 (*)     Hematocrit 32.8 (*)     MCV 85      MCH 29.1      MCHC 34.1      RDW 14.1      Platelets 327      MPV 10.7      Immature Granulocytes CANCELED      Immature Grans (Abs) CANCELED      Lymph # CANCELED      Mono # CANCELED      Eos # CANCELED      Baso # CANCELED      nRBC 0      Gran % 88.0 (*)     Lymph % 3.0 (*)     Mono % 5.0      Eosinophil % 0.0      Basophil % 1.0      Bands 3.0      Platelet Estimate Appears normal      Aniso Slight      Poik Slight      Ovalocytes Occasional      [place] Cells Occasional      Dohle Bodies Present      Differential Method Manual     COMPREHENSIVE METABOLIC PANEL - Abnormal    Sodium 127 (*)     Potassium 4.4      Chloride 95      CO2 19 (*)     Glucose 311 (*)     BUN 38 (*)     Creatinine 1.7 (*)     Calcium 9.4      Total Protein 7.0      Albumin 2.4 (*)     Total Bilirubin 0.6      Alkaline Phosphatase 242 (*)     AST 42 (*)     ALT 32      eGFR 44.7 (*)     Anion Gap 13     URINALYSIS, REFLEX TO URINE CULTURE - Abnormal    Specimen UA Urine, Clean Catch      Color, UA Yellow       Appearance, UA Hazy (*)     pH, UA 6.0      Specific Gravity, UA 1.030      Protein, UA 1+ (*)     Glucose, UA 4+ (*)     Ketones, UA Trace (*)     Bilirubin (UA) Negative      Occult Blood UA Trace (*)     Nitrite, UA Negative      Leukocytes, UA Negative      Narrative:     Specimen Source->Urine   HEMOGLOBIN A1C - Abnormal    Hemoglobin A1C 8.5 (*)     Estimated Avg Glucose 197 (*)    PHOSPHORUS - Abnormal    Phosphorus 1.9 (*)     Narrative:     Add on CRP per Dr. Miner @ 05:23 am to order # [id number]   PREALBUMIN - Abnormal    Prealbumin 3 (*)     Narrative:     Add on CRP per Dr. Miner @ 05:23 am to order # [id number]   TRANSFERRIN - Abnormal    Transferrin 119 (*)     Narrative:     Add on CRP per Dr. Miner @ 05:23 am to order # [id number]   C-REACTIVE PROTEIN - Abnormal    .3 (*)     Narrative:     Add on CRP per Dr. Miner @ 05:23 am to order # [id number]   ISTAT PROCEDURE - Abnormal    POC PH 7.332 (*)     POC PCO2 38.8      POC PO2 49      POC HCO3 20.5 (*)     POC BE -5 (*)     POC SATURATED O2 82      POC Sodium 127 (*)     POC Potassium 4.4      POC TCO2 22 (*)     POC Ionized Calcium 1.14      POC Hematocrit 36      Sample VENOUS      Site Other      Allens Test N/A     ISTAT LACTATE - Abnormal    POC Lactate 2.68 (*)     Sample VENOUS      Site Other      Allens Test N/A     CULTURE, BLOOD   CULTURE, BLOOD   APTT    aPTT 29.9     PROTIME-INR    Prothrombin Time 11.2      INR 1.0     TROPONIN I    Troponin I <0.006     B-TYPE NATRIURETIC PEPTIDE    BNP 97     MAGNESIUM    Magnesium 2.3      Narrative:     Add on CRP per Dr. Miner @ 05:23 am to order # [id number]   URINALYSIS MICROSCOPIC    RBC, UA 2      WBC, UA 0      Bacteria Occasional      Yeast, UA None      Squam Epithel, UA 1      Hyaline Casts, UA 0      Microscopic Comment SEE COMMENT      Narrative:     Specimen Source->Urine   OSMOLALITY, SERUM    Osmolality 286     OSMOLALITY, URINE RANDOM   SODIUM, URINE, RANDOM   SODIUM, URINE,  RANDOM    Sodium, Urine 20      Narrative:     add on Urine Sodium and Urine Osmolality per Filemon Miner MD/order#[id number] on  09/06/2024 @ 04:25.    Specimen Source->Urine   OSMOLALITY, URINE RANDOM    Osmolality, Urine 600      Narrative:     add on Urine Sodium and Urine Osmolality per Filemon Miner MD/order#[id number] on  09/06/2024 @ 04:25.    Specimen Source->Urine   C-REACTIVE PROTEIN   SEDIMENTATION RATE   ISTAT LACTATE    POC Lactate 1.23      Sample VENOUS      Site Other      Allens Test N/A     POCT GLUCOSE MONITORING CONTINUOUS     EKG Readings: (Independently Interpreted)   Initial Reading: No STEMI.       Imaging Results              X-Ray Foot Complete Right (Final result)  Result time 09/06/24 04:03:22      Final result by Ethan Morales MD (09/06/24 04:03:22)                   Impression:      Osteopenia.    Soft tissue swelling.      Electronically signed by: Ethan Morales  Date:    09/06/2024  Time:    04:03               Narrative:    EXAMINATION:  XR FOOT COMPLETE 3 VIEW RIGHT    CLINICAL HISTORY:  . Local infection of the skin and subcutaneous tissue, unspecified    TECHNIQUE:  AP, lateral, and oblique views of the right foot were performed.    COMPARISON:  Right foot series 07/18/2024    FINDINGS:  Partially visualized ORIF of the distal tibia and fibula.  Soft tissue swelling about the ankle and foot.  Diffuse osteopenia in the foot, potentially limiting radiographic assessment for detection of some fractures or other osseous abnormalities..  Phalanges are suboptimally visualized.  Allowing for this, no acute fracture deformity or dislocation is demonstrated in the right foot.  Small vessel atherosclerotic calcifications, most apparent at the ankle.  Degenerative changes.                                       X-Ray Chest AP Portable (Final result)  Result time 09/06/24 03:56:39      Final result by Ethan Morales MD (09/06/24 03:56:39)                   Impression:       No acute radiographic abnormality.      Electronically signed by: Ethan Morales  Date:    09/06/2024  Time:    03:56               Narrative:    EXAMINATION:  XR CHEST AP PORTABLE    CLINICAL HISTORY:  Sepsis;    TECHNIQUE:  Single frontal view of the chest was performed.    COMPARISON:  Portable chest x-ray 07/18/2024    FINDINGS:  Midline trachea.  No acute cardiomediastinal, pulmonary, pleural, skeletal, or upper abdominal abnormality is apparent radiographically, allowing for the suboptimal inspiratory result.    EKG leads project upon the torso.    Scattered degenerative changes, including the poorly visualized spine.                                       X-Ray Ankle Complete Right (Final result)  Result time 09/06/24 03:59:05      Final result by Ethan Morales MD (09/06/24 03:59:05)                   Impression:      Soft tissue swelling about the ankle.    No adverse interval change in the radiographic appearance of the distal fibular, medial malleolar, and posterior malleolar ORIF.      Electronically signed by: Ethan Morales  Date:    09/06/2024  Time:    03:59               Narrative:    EXAMINATION:  XR ANKLE COMPLETE 3 VIEW RIGHT    CLINICAL HISTORY:  Local infection of the skin and subcutaneous tissue, unspecified    TECHNIQUE:  AP, lateral, and oblique images of the right ankle were performed.    COMPARISON:  Right ankle radiographs 08/09/2024 and 07/18/2024    FINDINGS:  Status post ORIF of distal fibular, medial malleolar, and posterior malleolar fractures.  No evidence of recurrent tibiotalar dislocation.    Soft tissue swelling about the ankle.  Small vessel atherosclerotic calcifications.                                       X-Ray Tibia Fibula 2 View Right (Final result)  Result time 09/06/24 03:55:33      Final result by Ethan Morales MD (09/06/24 03:55:33)                   Impression:      Postsurgical changes, and soft tissue swelling at the ankle, as detailed in the FINDINGS.   Clinical correlation and continued imaging follow-up recommended.      Electronically signed by: Ethan Morales  Date:    09/06/2024  Time:    03:55               Narrative:    EXAMINATION:  XR TIBIA FIBULA 2 VIEW RIGHT    CLINICAL HISTORY:  Local infection of the skin and subcutaneous tissue, unspecified    TECHNIQUE:  AP and lateral views of the right tibia and fibula were performed.    COMPARISON:  Pre reduction radiographs of the right tibia and fibula dated 07/18/2024    FINDINGS:  There are 2 abandoned anterior-posteriorly oriented pin tracks in the right mid tibial shaft.  On AP views, each of these pin tracks demonstrates a small faint internal density, possibly representing a sequestrum.    There is a distal fibular side plate with multiple side screws, including 2 syndesmotic screws, transfixing the now-reduced distal fibular fracture.    There are 2 screws transfixing a reduced medial malleolar fracture.    The previously demonstrated lateral dislocation of the talus at the tibiotalar joint has been reduced.  Some minimal widening of the tibiotalar joint space is difficult to exclude, as could be seen with increased fluid within the joint.    There is an additional screw, likely related to transection of the posterior malleolar fracture, which demonstrates improved alignment.    There is soft tissue swelling about the ankle.                                    X-Rays:   Independently Interpreted Readings:   Other Readings:  Plane film of the right ankle shows soft tissue infection with possible osteolytic features     Medications   clindamycin in D5W 900 mg/50 mL IVPB 900 mg (0 mg Intravenous Stopped 9/6/24 0333)   glucagon (human recombinant) injection 1 mg (has no administration in time range)   insulin aspart U-100 pen 0-5 Units (has no administration in time range)   dextrose 10% bolus 125 mL 125 mL (has no administration in time range)   dextrose 10% bolus 250 mL 250 mL (has no administration in time  range)   glucose chewable tablet 16 g (has no administration in time range)   glucose chewable tablet 24 g (has no administration in time range)   insulin aspart U-100 pen 0-10 Units (has no administration in time range)   dextrose 10% bolus 125 mL 125 mL (has no administration in time range)   dextrose 10% bolus 250 mL 250 mL (has no administration in time range)   vancomycin - pharmacy to dose (has no administration in time range)   emtricitabine-tenofovir 200-300 mg per tablet 1 tablet (has no administration in time range)   gabapentin capsule 600 mg (has no administration in time range)     And   gabapentin capsule 1,200 mg (has no administration in time range)   nortriptyline capsule 50 mg (has no administration in time range)   atorvastatin tablet 40 mg (has no administration in time range)   sodium chloride 0.9% bolus 2,790 mL 2,790 mL (0 mLs Intravenous Stopped 9/6/24 0536)   vancomycin (VANCOCIN) 2,250 mg in D5W 500 mL IVPB (0 mg Intravenous Stopped 9/6/24 0641)     Medical Decision Making  64M as described above presenting with AMS and sxs of soft tissue infection with sepsis.     DdX includes but isn't limited to paranoid delusions, intracranial bleed, intracranial mass, UTI, electrolyte disturbance,, dehydration, hyper hypo thyroidism. Will order labs and imaging to illuminate evolvement. Orthopedic surgery consulted. Sepsis treatment initiated.  Patient will be admitted with ortho following and ongoing antibiotic and fluid treatment.     Amount and/or Complexity of Data Reviewed  Labs: ordered. Decision-making details documented in ED Course.  Radiology: ordered. Decision-making details documented in ED Course.  ECG/medicine tests:  Decision-making details documented in ED Course.    Risk  Prescription drug management.  Decision regarding hospitalization.               ED Course as of 09/06/24 0830   Fri Sep 06, 2024   0247 POC Lactate(!): 2.68 [JL]   0301 WBC(!): 18.68 [JL]   0301 POC PH(!): 7.332 [JL]    0301 POC HCO3(!): 20.5 [JL]      ED Course User Index  [JL] Moriah Ascencio MD                             Clinical Impression:  Final diagnoses:  [L08.9] Soft tissue infection          ED Disposition Condition    Admit Stable                Moriah Ascencio MD  Resident  09/06/24 6558

## 2024-09-06 NOTE — HPI
Cortes Schroeder is a 64 year old with HTN, poorly controlled DM (A1C 8.5), recent history of syncopal episodes, and  right ankle fracture on 7/18/24 s/p ex fix with subsequent ORIF on 7/26/24 with Dr. Liz.  At outpatient Orthopedic follow up surgical wound was healing and sutures removed.  Since then patient  reportedly doing well until about 1 week ago when he noticed some drainage from his surgical incisions.  Over the past week he developed fevers, chills, and night sweats.  Yesterday he became confused/altered and was brought to ED.  In ED was hypotensive, tachycardic, WBC 18, .  Sepsis protocol initiated and started on clindamycin has been doing well postoperatively until around 1 week ago when he started noticing some drainage from his surgical incisions. He started to develop fevers, chills and night sweats over the past week. Yesterday afternoon, patient became altered and was brought to the ED by his roommate. Patient currently lives in [place]. Upon presentation to the ED, patient was tachycardic, hypotensive and afebrile. WBC of 18.68, .3. Sepsis protocol initiated. Patient was started on clindamycin and vancomycin.  Now on vancomycin and cefepime.       Orthopedics consulted.  Noted draining wound over the medial side of the right ankle as well as eschar formation over the lateral side of the ankle.  Right ankle and foot noted to be erythematous and edematous.  Pending surgical I&D today.

## 2024-09-06 NOTE — ED NOTES
Nurses Note -- 4 Eyes      9/6/2024   2:30 AM      Skin assessed during: Admit      [] No Altered Skin Integrity Present    []Prevention Measures Documented      [x] Yes- Altered Skin Integrity Present or Discovered   [x] LDA Added if Not in Epic (Describe Wound)   [x] New Altered Skin Integrity was Present on Admit and Documented in LDA   [x] Wound Image Taken    Wound Care Consulted? No    Attending Nurse:  Josh Hoffmann RN/Staff Member:  Stefani

## 2024-09-06 NOTE — ASSESSMENT & PLAN NOTE
"This patient does have evidence of infective focus  My overall impression is sepsis.  Source: Skin and Soft Tissue (location ankle)  Antibiotics given-   Antibiotics (72h ago, onward)      Start     Stop Route Frequency Ordered    09/06/24 2130  ceFEPIme (MAXIPIME) 2 g in D5W 100 mL IVPB (MB+)         -- IV Every 12 hours (non-standard times) 09/06/24 1651    09/06/24 1249  mupirocin 2 % ointment         -- Nasl On Call Procedure 09/06/24 1249    09/06/24 1249  ceFAZolin 2 g in D5W 50 mL IVPB (MB+)         -- IV On Call Procedure 09/06/24 1249    09/06/24 0911  vancomycin - pharmacy to dose  (vancomycin IVPB (PEDS and ADULTS))        Placed in "And" Linked Group    -- IV pharmacy to manage frequency 09/06/24 0811          Latest lactate reviewed-  Recent Labs   Lab 09/06/24  0536   POCLAC 1.23       Organ dysfunction indicated by Acute kidney injury and Encephalopathy    Fluid challenge Actual Body weight- Patient will receive 30ml/kg actual body weight to calculate fluid bolus for treatment of septic shock.     Post- resuscitation assessment Yes Perfusion exam was performed within 6 hours of septic shock presentation after bolus shows Adequate tissue perfusion assessed by non-invasive monitoring       Will Not start Pressors-   Source control achieved by: iv antibiotics , f/u with blood cx   "

## 2024-09-06 NOTE — ASSESSMENT & PLAN NOTE
Patient's FSGs are not controlled on current hypoglycemics.   Last A1c reviewed-   Lab Results   Component Value Date    LABA1C 10.8 (H) 08/15/2016    HGBA1C 8.5 (H) 09/06/2024     Most recent fingerstick glucose reviewed-   Recent Labs   Lab 09/06/24  0922 09/06/24  1220 09/06/24  1702   POCTGLUCOSE 216* 201* 256*     Current correctional scale  Low  Maintain anti-hyperglycemic dose as follows-   Antihyperglycemics (From admission, onward)      Start     Stop Route Frequency Ordered    09/06/24 1000  insulin glargine U-100 (Lantus) pen 5 Units         -- SubQ Daily 09/06/24 0954    09/06/24 0740  insulin aspart U-100 pen 0-10 Units         -- SubQ Before meals & nightly PRN 09/06/24 0640    09/06/24 0506  insulin aspart U-100 pen 0-5 Units         -- SubQ Every 6 hours PRN 09/06/24 0406           - Endocrine consulted  - hold oral antihyperglycemics during hospitalization   - needs better BG control for optimal wound healing

## 2024-09-06 NOTE — OP NOTE
OP NOTE    DOS:  09/06/2024    Preop Dx: Wound breakdown medial and lateral incisions status post ORIF right trimalleolar ankle fracture 6 weeks ago    Poorly controlled diabetes    Postop Dx: Wound breakdown medial and lateral incisions status post ORIF right trimalleolar ankle fracture 6 weeks ago    Poorly controlled diabetes    Procedure: Excisional debridement and irrigation right medial and lateral ankle wounds, skin, subcutaneous tissue and fascia, 15 sq cm with irrigation - [id number]    Fasciocutaneous advancement lateral ankle wound 20 cm    Wound VAC placement medial right ankle wound, less than 50 sq cm      Surgeon: Moe Liz M.D.    Asst:  Andreas Castaneda M.D    Anesthesia: Regional plus MAC    EBL:  Minimal    IVF:  1000 cc crystalloid    Implants: None    Specimens: Swab cultures    Findings: Some purulence posterior and lateral well as medial    Dispo:  To PACU extubated/stable       Indications for Procedure:      64-year-old male who sustained a trimalleolar right ankle fracture which was treated with ORIF on 07/19/2024 after an earlier external fixation.  Patient had a hemoglobin A1c near 10.  Patient has had some eschar forming on the medial and lateral wounds and then had medial wound breakdown.  Patient presented to the hospital with signs of sepsis.  I am taking him to the operating room for debridement and irrigation and exploration of his wounds.  The risks, benefits and alternatives to surgery were discussed with the patient prior to going the operating room.  Informed consent was obtained.    Procedure in Detail:    Patient was identified the preoperative holding area and site was marked.  Regional analgesia was administered.  Patient was wheeled to the operating room.  Patient was placed on the operating room table and monitored anesthesia care was induced.  The right lower extremity was placed into a nonsterile tourniquet and then prepped and draped in sterile fashion using Betadine  paint over the medial and lateral wounds.  A time-out was undertaken to confirm patient, side, site, surgery, surgeon.  All agreed we proceeded.  The patient had been placed on antibiotics upon presentation given signs of sepsis.  2 g Ancef were also given.  The leg was elevated and tourniquet was raised.      I began by drawing an ellipse around the lateral incision which had a large area of eschar and a small distal wound which had some purulence.  I excised a full-thickness layer of skin and subcutaneous tissue from the area.  The underlying fascia over the majority of the plate was intact but distally over the distal aspect of the plate and there was purulence and visible plate.  I took a swab culture of the area.  I then did a cursory irrigation.  I then opened the fascia all along the plate and excised a significant amount of fascia from the area.  I then worked my way around posterolaterally and encountered a significant amount of more purulence.  I again irrigated this with normal saline solution via pulse lavage.      I turned my attention medially and anthony an ellipse around the eschar wound breakdown on the medial wound and excised a full-thickness skin and subcutaneous tissue paddle.  There was purulence on the medial side.  I also used electrocautery to remove some pedunculated periosteum.  I irrigated the medial side with normal saline solution.    At this point I irrigated both the medial and lateral sides with Bactisure to try to break up any glycol calyx/biofilm.  I allowed this to sit for several minutes.  I then irrigated again copiously with normal saline solution via pulse lavage.  I then irrigated and turned with Dakin solution and dilute peroxide solution with normal saline solution in between.  I finally irrigated copiously with normal saline solution via pulse lavage for a total of 6 L.    At this point I turned back to the lateral side.  I decided that if I could not close both wounds I  wanted to close the lateral wound which was over the plate.  I then used electrocautery to mobilize full-thickness fasciocutaneous layers anteriorly and posteriorly freeing that full-thickness layer up from behind the peroneals and from the associated anterior tissue.  I again irrigated with normal saline solution.  After I had significantly mobilized the anterior and posterior flaps laterally I placed vancomycin cefepime powder posterior and lateral in the tibia and then closed the fascial layer with 0 Vicryl suture, subcutaneous tissue with 3-0 Vicryl suture and the skin with skin staples.    I turned my attention back to the medial side and it was apparent that this would not be able to be closed along with the lateral side.  I again irrigated this, placed vancomycin powder and then placed a white sponge followed by a black sponge for a wound VAC. I went back to the lateral side and placed hydrocolloid around my staple line and then placed Adaptic followed by a sponge for an incisional wound VAC on the lateral side and irregular wound VAC on the medial side connected via Y connector.  Good suction seal was obtained.  Patient was then placed into a posterior short-leg splint with stirrups.      All instrument and sponge counts were reported correct at the end of the case.  There no complications.  The patient was awakened and taken to the recovery room in stable condition.      Plan the patient:     We need to keep his blood sugar under good control.  He was over 300 this morning and was very hard to gain good wound healing and fight infection until we have better blood sugar control.  I will bring him back to the operating room on Monday for repeat debridement irrigation and Plastic surgery visualize the medial wound at that point in time to discuss options for closure.  Multimodal pain management limiting narcotics.    Moe Liz MD

## 2024-09-06 NOTE — CONSULTS
ID Consult Note    ID Consult acknowledged for sepsis 2/2 infected ankle hardware   Patient in OR and not seen. Chart reviewed.       He is a 64 year old with HTN, poorly controlled DM (A1C 8.5), recent right ankle fracture on 7/18/24 s/p ex fix with subsequent ORIF on 7/26/24 with Dr. Liz.  At outpatient Orthopedic follow up 8/16 surgical wound was healing and sutures removed.  Reportedly doing well until about 1 week ago when developed drainage from his surgical incisions.  Subsequently developed fevers, chills, sweats.   Yesterday he became confused/altered and presented to ED where he was found to be hypotensive, tachycardic, WBC 18, .  Received clindamycin and vancomycin.     Orthopedics consulted.  Noted draining wound over the medial side of the right ankle as well as eschar formation over the lateral side of the ankle.  Right ankle and foot noted to be erythematous and edematous.  He went for surgical I&D today.  Currently on IV vancomycin and cefepime.     Recommendations:   Continue IV vancomycin (pharmacy to dose.  Trough goal 15-20)   Continue IV cefepime, but increased dose to 2 g q 12 hours (pseudomonal renal dosing)    Will follow surgical cultures and follow up for full consult tomorrow.   4.    For any questions or concerns over the weekend, please secure chat YARELIS Ayon, during daytime hours    Data reviewed and plan discussed with ID staff  Secure chat/Discussed above plan with Primary Team,  Dr. Miner        Thank you.   Please Secure Chat for any questions or concerns.  ISELA Alexis, ANP-C  Infectious Disease

## 2024-09-06 NOTE — PROGRESS NOTES
Pharmacokinetic Initial Assessment: IV Vancomycin    Assessment/Plan:    Initiate intravenous vancomycin with loading dose of 2250 mg once given in ED with subsequent doses when random concentrations are less than 20 mcg/mL.  Desired empiric serum trough concentration is 15 to 20 mcg/mL.  Draw vancomycin random level on 9/7/24 at 0400 with AM labs.  Pharmacy will continue to follow and monitor vancomycin.      Please contact pharmacy at extension [phone] with any questions regarding this assessment.     Thank you for the consult,   Sharonneda Salinas       Patient brief summary:  Cortes Schroeder is a 64 y.o. male initiated on antimicrobial therapy with IV Vancomycin for treatment of suspected bone/joint infection.    Drug Allergies:   Review of patient's allergies indicates:   Allergen Reactions    Invokana [canagliflozin] Anaphylaxis    Percocet [oxycodone-acetaminophen] Nausea Only and Hallucinations    Biaxin [clarithromycin]     Hydrocodone Other (See Comments)     Dizzy/nausea/hallucinations    Sulfa (sulfonamide antibiotics) Nausea Only and Rash       Actual Body Weight:   93 kg    Renal Function:   Estimated Creatinine Clearance: 46.6 mL/min (A) (based on SCr of 1.7 mg/dL (H)).,     Dialysis Method (if applicable):  N/A    CBC (last 72 hours):  Recent Labs   Lab Result Units 09/06/24  0235 09/06/24  0420   WBC K/uL 18.68*  --    Hemoglobin g/dL 11.2*  --    Hemoglobin A1C %  --  8.5*   Hematocrit % 32.8*  --    Platelets K/uL 327  --    Gran % % 88.0*  --    Lymph % % 3.0*  --    Mono % % 5.0  --    Eosinophil % % 0.0  --    Basophil % % 1.0  --    Differential Method  Manual  --        Metabolic Panel (last 72 hours):  Recent Labs   Lab Result Units 09/06/24  0235 09/06/24  0254 09/06/24  0420   Sodium mmol/L 127*  --   --    Sodium, Urine mmol/L  --  20  --    Potassium mmol/L 4.4  --   --    Chloride mmol/L 95  --   --    CO2 mmol/L 19*  --   --    Glucose mg/dL 311*  --   --    Glucose, UA   --  4+*  --   "  BUN mg/dL 38*  --   --    Creatinine mg/dL 1.7*  --   --    Albumin g/dL 2.4*  --   --    Total Bilirubin mg/dL 0.6  --   --    Alkaline Phosphatase U/L 242*  --   --    AST U/L 42*  --   --    ALT U/L 32  --   --    Magnesium mg/dL  --   --  2.3   Phosphorus mg/dL  --   --  1.9*       Drug levels (last 3 results):  No results for input(s): "VANCOMYCINRA", "VANCORANDOM", "VANCOMYCINPE", "VANCOPEAK", "VANCOMYCINTR", "VANCOTROUGH" in the last 72 hours.    Microbiologic Results:  Microbiology Results (last 7 days)       Procedure Component Value Units Date/Time    Blood culture x two cultures. Draw prior to antibiotics. [[id number]] Collected: 09/06/24 0235    Order Status: Sent Specimen: Blood from Peripheral, Antecubital, Left Updated: 09/06/24 0248    Blood culture x two cultures. Draw prior to antibiotics. [[id number]] Collected: 09/06/24 0235    Order Status: Sent Specimen: Blood from Peripheral, Antecubital, Left Updated: 09/06/24 0248            "

## 2024-09-06 NOTE — TRANSFER OF CARE
"Anesthesia Transfer of Care Note    Patient: Cortes Schroeder    Procedure(s) Performed: Procedure(s) (LRB):  APPLICATION, WOUND VAC (Right)  IRRIGATION AND DEBRIDEMENT (Right)    Patient location: PACU    Anesthesia Type: general    Transport from OR: Transported from OR on 6-10 L/min O2 by face mask with adequate spontaneous ventilation    Post pain: adequate analgesia    Post assessment: no apparent anesthetic complications and tolerated procedure well    Post vital signs: stable    Level of consciousness: responds to stimulation    Nausea/Vomiting: no nausea/vomiting    Complications: none    Transfer of care protocol was followedComments: Patient hypotensive upon arrival to PACU. Javid given, fluids opened. PACU nurse at . SBP returned to 100. Will notify staff with patient remains hypotensive.       Last vitals: Visit Vitals  BP (!) 79/53 (BP Location: Right arm, Patient Position: Lying)   Pulse 60   Temp 36.5 °C (97.7 °F) (Temporal)   Resp 16   Ht 5' 5" (1.651 m)   Wt 93 kg (205 lb)   SpO2 100%   BMI 34.11 kg/m²     "

## 2024-09-06 NOTE — ASSESSMENT & PLAN NOTE
"Patient's FSGs are uncontrolled due to hyperglycemia on current medication regimen.  Last A1c reviewed-   Lab Results   Component Value Date    LABA1C 10.8 (H) 08/15/2016    HGBA1C 9.9 (H) 07/18/2024     Most recent fingerstick glucose reviewed- No results for input(s): "POCTGLUCOSE" in the last 24 hours.  Current correctional scale  Low  Maintain anti-hyperglycemic dose as follows-   Antihyperglycemics (From admission, onward)      Start     Stop Route Frequency Ordered    09/06/24 0506  insulin aspart U-100 pen 0-5 Units         -- SubQ Every 6 hours PRN 09/06/24 0406          Hold Oral hypoglycemics while patient is in the hospital.  "

## 2024-09-06 NOTE — HPI
Cortes Schroeder is a 64 y.o. male with PMH significant for DM and HTN presenting with right ankle wound.  Patient sustained a right ankle fracture on 07/18/24 status post right ankle ex fix with subsequent definitive fixation on 07/26/2024.  Patient has been doing well postoperatively until around 1 week ago when he started noticing some drainage from his surgical incisions.  He started to develop fevers, chills and night sweats over the past week.  Yesterday afternoon, patient became altered and was brought to the ED by his roommate.  Patient currently lives in [place].  Upon presentation to the ED, patient was tachycardic, hypotensive and afebrile.  WBC of 18.68, .3.  Sepsis protocol initiated.  Patient was started on clindamycin and vancomycin.  Patient denies any numbness and tingling to the right lower extremity.

## 2024-09-06 NOTE — ANESTHESIA PROCEDURE NOTES
Right Popliteal SS    Patient location during procedure: pre-op   Block not for primary anesthetic.  Reason for block: at surgeon's request and post-op pain management   Post-op Pain Location: Right leg pain   Start time: 9/6/2024 1:31 PM  Timeout: 9/6/2024 1:30 PM   End time: 9/6/2024 1:39 PM    Staffing  Authorizing Provider: Zayra Tony MD  Performing Provider: Bernarda Caro MD    Staffing  Performed by: Bernarda Caro MD  Authorized by: Zayra Tony MD    Preanesthetic Checklist  Completed: patient identified, IV checked, site marked, risks and benefits discussed, surgical consent, monitors and equipment checked, pre-op evaluation and timeout performed  Peripheral Block  Patient position: supine  Prep: ChloraPrep  Patient monitoring: heart rate, cardiac monitor, continuous pulse ox, continuous capnometry and frequent blood pressure checks  Block type: popliteal  Laterality: right  Injection technique: single shot  Needle  Needle type: Echogenic   Needle gauge: 20 G  Needle length: 4 in  Needle localization: anatomical landmarks and ultrasound guidance   -ultrasound image captured on disc.  Assessment  Injection assessment: negative aspiration, negative parasthesia and local visualized surrounding nerve  Paresthesia pain: none  Heart rate change: no  Slow fractionated injection: yes  Pain Tolerance: comfortable throughout block  Medications:    Medications: bupivacaine (pf) (MARCAINE) injection 0.5% - Perineural   25 mL - 9/6/2024 1:39:00 PM    Additional Notes  VSS.  [facility] RN monitoring vitals throughout procedure.  Patient tolerated procedure well.

## 2024-09-06 NOTE — ASSESSMENT & PLAN NOTE
Hyponatremia is likely due to Dehydration/hypovolemia. The patient's most recent sodium results are listed below.  Recent Labs     09/06/24  0235   *       Plan  - Correct the sodium by 4-6mEq in 24 hours.   - Obtain the following studies: Urine sodium, urine osmolality, serum osmolality.  - Will treat the hyponatremia with IV fluids as follows: NS  - Monitor sodium Daily.   - Patient hyponatremia is worsening. Will continue current treatment

## 2024-09-06 NOTE — CONSULTS
[facility]  Orthopedics  Consult Note    Patient Name: Cortes Schroeder  MRN: [id number]  Admission Date: 9/6/2024  Hospital Length of Stay: 0 days  Attending Provider: Filemon Miner MD  Primary Care Provider: Andrew Sinclair Jr., MD        Inpatient consult to Orthopedic Surgery  Consult performed by: SONA Price MD  Consult ordered by: Moriah Ascencio MD        Subjective:     Principal Problem:<principal problem not specified>    Chief Complaint:   Chief Complaint   Patient presents with    Altered Mental Status     Had orthopedic surgery on right leg, redness and swelling noted to surgical site. According to family, the pt is not acting himself, fidgety, and has not eaten in 3 days.         HPI: Cortes Schroeder is a 64 y.o. male with PMH significant for DM and HTN presenting with right ankle wound.  Patient sustained a right ankle fracture on 07/18/24 status post right ankle ex fix with subsequent definitive fixation on 07/26/2024.  Patient has been doing well postoperatively until around 1 week ago when he started noticing some drainage from his surgical incisions.  He started to develop fevers, chills and night sweats over the past week.  Yesterday afternoon, patient became altered and was brought to the ED by his roommate.  Patient currently lives in [place].  Upon presentation to the ED, patient was tachycardic, hypotensive and afebrile.  WBC of 18.68, .3.  Sepsis protocol initiated.  Patient was started on clindamycin and vancomycin.  Patient denies any numbness and tingling to the right lower extremity.      Past Medical History:   Diagnosis Date    Anxiety 12/18/2012    Back pain 12/18/2012    Cataract     Chronic pain syndrome 04/24/2016    Diabetes type 2, controlled 02/20/2016    Diabetic retinopathy     DM (diabetes mellitus) 12/18/2012    DM (diabetes mellitus), type 2, uncontrolled 11/16/2013    Gastroesophageal reflux disease without esophagitis  02/20/2016    Hyperlipidemia 12/18/2012    Insomnia 08/07/2014    Neuropathy 11/16/2013       Past Surgical History:   Procedure Laterality Date    APPLICATION, EXTERNAL FIXATION DEVICE, FOR ANKLE FRACTURE Right 7/18/2024    Procedure: APPLICATION, EXTERNAL FIXATION DEVICE, FOR ANKLE FRACTURE;  Surgeon: Moe Liz MD;  Location: [address];  Service: Orthopedics;  Laterality: Right;    BACK SURGERY  2003    Lumbar Spine    CATARACT EXTRACTION      CLOSED REDUCTION, FRACTURE, ANKLE, TRIMALLEOLAR Right 7/18/2024    Procedure: CLOSED REDUCTION, FRACTURE, ANKLE, TRIMALLEOLAR;  Surgeon: Moe Liz MD;  Location: [facility] OR [address] [place] [address];  Service: Orthopedics;  Laterality: Right;    EPIDURAL STEROID INJECTION N/A 1/16/2019    Procedure: Injection, Steroid, Epidural Cervical;  Surgeon: Andrew Sinclair Jr., MD;  Location: [facility] ENDO;  Service: Pain Management;  Laterality: N/A;  Cervical Epidural Steroid Injection C7-T1    54712    Arrive @ 1240    EPIDURAL STEROID INJECTION N/A 2/20/2019    Procedure: Injection, Steroid, Epidural;  Surgeon: Andrew Sinclair Jr., MD;  Location: [facility] ENDO;  Service: Pain Management;  Laterality: N/A;  Lumbar Epidural Steroid Injection L4-5    39578    Arrive @ 1215 (requests latest time)    FIXATION OF SYNDESMOSIS OF ANKLE Right 7/26/2024    Procedure: FIXATION, SYNDESMOSIS, ANKLE;  Surgeon: Moe Liz MD;  Location: [facility] OR [address] [place] [address];  Service: Orthopedics;  Laterality: Right;    INJECTION, SPINE, LUMBOSACRAL, TRANSFORAMINAL APPROACH Bilateral 6/14/2024    Procedure: Bilateral L5 Transforaminal Epidural Steroid Injections;  Surgeon: Andrew Sinclair Jr., MD;  Location: [facility] PAIN MANAGEMENT;  Service: Pain Management;  Laterality: Bilateral;  @1300(given)  ASA last 6/8  Check BG  MD Sign.    OPEN REDUCTION AND INTERNAL FIXATION (ORIF) OF FRACTURE OF DISTAL RADIUS Left 7/19/2024    Procedure: ORIF, FRACTURE, RADIUS, DISTAL - LEFT;  Surgeon: Moe Liz MD;   Location: [facility] OR [address] [place] [address];  Service: Orthopedics;  Laterality: Left;  LEFT, SYNTHES, C ARM    OPEN REDUCTION AND INTERNAL FIXATION (ORIF) OF INJURY OF ANKLE Right 7/26/2024    Procedure: ORIF, ANKLE;  Surgeon: Moe Liz MD;  [facility];  Service: Orthopedics;  Laterality: Right;    REMOVAL OF EXTERNAL FIXATION DEVICE Right 7/26/2024    Procedure: REMOVAL, EXTERNAL FIXATION DEVICE;  Surgeon: Moe Liz MD;  Location: [facility] OR [address] [place] [address];  Service: Orthopedics;  Laterality: Right;       Review of patient's allergies indicates:   Allergen Reactions    Invokana [canagliflozin] Anaphylaxis    Percocet [oxycodone-acetaminophen] Nausea Only and Hallucinations    Biaxin [clarithromycin]     Hydrocodone Other (See Comments)     Dizzy/nausea/hallucinations    Sulfa (sulfonamide antibiotics) Nausea Only and Rash       Current Facility-Administered Medications   Medication    atorvastatin tablet 40 mg    ceFEPIme (MAXIPIME) 1 g in D5W 100 mL IVPB (MB+)    dextrose 10% bolus 125 mL 125 mL    dextrose 10% bolus 125 mL 125 mL    dextrose 10% bolus 250 mL 250 mL    dextrose 10% bolus 250 mL 250 mL    emtricitabine-tenofovir 200-300 mg per tablet 1 tablet    gabapentin capsule 600 mg    And    gabapentin capsule 1,200 mg    glucagon (human recombinant) injection 1 mg    glucose chewable tablet 16 g    glucose chewable tablet 24 g    insulin aspart U-100 pen 0-10 Units    insulin aspart U-100 pen 0-5 Units    nortriptyline capsule 50 mg    vancomycin - pharmacy to dose     Current Outpatient Medications   Medication Sig    acetaminophen (TYLENOL) 325 MG tablet Take 2 tablets (650 mg total) by mouth every 6 (six) hours.    aspirin (ECOTRIN) 81 MG EC tablet Take 1 tablet (81 mg total) by mouth 2 (two) times a day. End date sept 20, 2024    atorvastatin (LIPITOR) 40 MG tablet Take 40 mg by mouth once daily.    blood-glucose sensor (DEXCOM G6 SENSOR) Omaira Change sensor every 10 days    blood-glucose  transmitter (DEXCOM G6 TRANSMITTER) Omaira Change transmitter every 3 months    celecoxib (CELEBREX) 200 MG capsule Take 1 capsule (200 mg total) by mouth once daily.    cyanocobalamin (VITAMIN B-12) 1000 MCG tablet Take 1,000 mcg by mouth once daily.    DULoxetine (CYMBALTA) 60 MG capsule Take 1 capsule (60 mg total) by mouth once daily.    emtricitabine-tenofovir 200-300 mg (TRUVADA) 200-300 mg Tab Take 1 tablet by mouth once daily.    enoxaparin (LOVENOX) 40 mg/0.4 mL Syrg Inject 40 mg into the skin Daily.    fish oil-omega-3 fatty acids 300-1,000 mg capsule Take 1 capsule by mouth 2 (two) times daily.    gabapentin (NEURONTIN) 300 MG capsule Take 2 capsules (600 mg total) by mouth once daily AND 4 capsules (1,200 mg total) every evening.    HUMALOG U-100 INSULIN 100 unit/mL injection 1:20 U PRN high blood sugar and snacks. Correction dose- Enter carb coverage intake upon administration  Target number 120 Carbohydrate coverage #1 1:2 Carbohydrate coverage #1 time [id number] Carbohydrate coverage #2 1:3 Carbohydrate coverage #2 time [id number] Carbohydrate coverage #3 Carbohydrate coverage #3 time Carbohydrate coverage #4 Carbohydrate coverage #4 time Sensitivity #1 1:20 Sensitivity #1 time 2620-8799 Sensitivity #2 Sensitivity #2 time Sensitivity #3 Sensitivity #3 time Sensitivity #4 Sensitivity #4 time Sensitivity #5 Sensitivity #5 time Order Questions Question Answer Comment       50 U SQ continuous  Target number 120 Basal Rate #1 2.3 Basal rate #1 time 8633-4764 Basal Rate #2 1.55 Basal rate #2 time 2012-4537 Basal rate #3 Basal rate #3 time Basal rate #4 Basal rate #4 time Basal rate #5 Basal rate #5 time    losartan (COZAAR) 25 MG tablet Take 1 tablet (25 mg total) by mouth once daily.    magnesium oxide (MAG-OX) 400 mg (241.3 mg magnesium) tablet Take 0.5 tablets (200 mg total) by mouth once daily.    melatonin (MELATIN) 3 mg tablet Take 2 tablets (6 mg total) by mouth nightly as needed for Insomnia.     methocarbamoL (ROBAXIN) 500 MG Tab Take 1 tablet (500 mg total) by mouth 4 (four) times daily as needed (pain scale 4-7).    morphine (MSIR) 15 MG tablet Take 1 tablet (15 mg total) by mouth every 4 (four) hours as needed (pain scale 6-10).    MOUNJARO 10 mg/0.5 mL PnIj Inject 10 mg into the skin once a week. HOLD until all surgeries completed and out of rehab    nortriptyline (PAMELOR) 50 MG capsule Take 1 capsule (50 mg total) by mouth nightly.    ondansetron (ZOFRAN-ODT) 4 MG TbDL Take 2 tablets (8 mg total) by mouth every 6 (six) hours as needed (nausea).    polyethylene glycol (GLYCOLAX) 17 gram PwPk Take 17 g by mouth once daily.    rosuvastatin (CRESTOR) 10 MG tablet Take 10 mg by mouth every evening.    senna-docusate 8.6-50 mg (PERICOLACE) 8.6-50 mg per tablet Take 1 tablet by mouth 2 (two) times daily.    vitamin D 1000 units Tab Take 1,000 Units by mouth 2 (two) times daily.     Family History       Problem Relation (Age of Onset)    Alzheimer's disease Father    Cataracts Father    Heart attack Mother    Hypertension Father, Mother    Migraines Mother    Parkinsonism Father          Tobacco Use    Smoking status: Never    Smokeless tobacco: Never   Substance and Sexual Activity    Alcohol use: Yes     Alcohol/week: 1.0 standard drink of alcohol     Types: 1 Glasses of wine per week     Comment: occasionally    Drug use: No    Sexual activity: Not Currently     Partners: Male     Birth control/protection: Condom     Comment: 10/2/17      ROS  Constitutional: positive for fevers, chills and night sweats  Eyes: negative visual changes  ENT: negative for hearing loss  Respiratory: negative for dyspnea  Cardiovascular: negative for chest pain  Gastrointestinal: negative for abdominal pain  Genitourinary: negative for dysuria  Neurological: positive for headaches  Behavioral/Psych: negative for hallucinations  Endocrine: negative for temperature intolerance    Objective:     Vital Signs (Most  "Recent):  Temp: 99.6 °F (37.6 °C) (09/06/24 0940)  Pulse: (!) 116 (09/06/24 0940)  Resp: 17 (09/06/24 0940)  BP: 131/75 (09/06/24 0940)  SpO2: 95 % (09/06/24 0940) Vital Signs (24h Range):  Temp:  [98 °F (36.7 °C)-99.9 °F (37.7 °C)] 99.6 °F (37.6 °C)  Pulse:  [104-116] 116  Resp:  [17-62] 17  SpO2:  [93 %-100 %] 95 %  BP: (104-150)/(50-83) 131/75     Weight: 93 kg (205 lb)  Height: 5' 5" (165.1 cm)  Body mass index is 34.11 kg/m².      Intake/Output Summary (Last 24 hours) at 9/6/2024 0941  Last data filed at 9/6/2024 0536  Gross per 24 hour   Intake 2840 ml   Output --   Net 2840 ml        Ortho/SPM Exam  General:  no acute distress, appears stated age   Neuro: alert and oriented x3  Psych: normal mood  Head: normocephalic, atraumatic.  Eyes: no scleral icterus  Mouth: moist mucous membranes  Cardiovascular: extremities warm and well perfused  Lungs: breathing comfortably, equal chest rise bilat  Skin: clean, dry, intact (any exceptions noted in below musculoskeletal exam)    MSK:  RUE:  - Skin intact throughout, no open wounds  - No swelling  - No ecchymosis, erythema, or signs of cellulitis  - NonTTP throughout  - AROM and PROM of the shoulder, elbow, wrist, and hand intact without pain  - Axillary/AIN/PIN/Radial/Median/Ulnar Nerves assessed in isolation without deficit  - SILT throughout  - Compartments soft  - Radial artery palpated   - Capillary Refill <3s    LUE:  - Skin intact throughout, no open wounds  - No swelling  - No ecchymosis, erythema, or signs of cellulitis  - NonTTP throughout  - AROM and PROM of the shoulder, elbow, wrist, and hand intact without pain  - Axillary/AIN/PIN/Radial/Median/Ulnar Nerves assessed in isolation without deficit  - SILT throughout  - Compartments soft  - Radial artery palpated   - Capillary Refill <3s    RLE:  - draining 8 cm wound over medial aspect of the ankle  - eschar present over the lateral aspect of the ankle  - moderate swelling and ecchymosis to ankle and foot  - " "TTP to ankle and foot  - AROM and PROM of the hip, knee, foot intact without pain  - mild pain with range of motion of foot  - TA/EHL/Gastroc/FHL assessed in isolation without deficit  - SILT throughout  - Compartments soft  - DP and PT palpated  - Capillary Refill <3s  - Negative Log roll  - Negative Critical access hospital    LLE:  - Skin intact throughout, no open wounds  - No swelling  - No ecchymosis, erythema, or signs of cellulitis  - NonTTP throughout  - AROM and PROM of the hip, knee, ankle, and foot intact without pain  - TA/EHL/Gastroc/FHL assessed in isolation without deficit  - SILT throughout  - Compartments soft  - DP and PT palpated  - Capillary Refill <3s  - Negative Log roll  - Negative Critical access hospital           Significant Labs: A1C:   Recent Labs   Lab 04/12/24  1652 07/18/24  1802 09/06/24  0420   HGBA1C 9.6* 9.9* 8.5*     CBC:   Recent Labs   Lab 09/06/24  0235 09/06/24  0242   WBC 18.68*  --    HGB 11.2*  --    HCT 32.8* 36     --      CMP:   Recent Labs   Lab 09/06/24  0235   *   K 4.4   CL 95   CO2 19*   *   BUN 38*   CREATININE 1.7*   CALCIUM 9.4   PROT 7.0   ALBUMIN 2.4*   BILITOT 0.6   ALKPHOS 242*   AST 42*   ALT 32   ANIONGAP 13     Coagulation:   Recent Labs   Lab 09/06/24  0235   LABPROT 11.2   INR 1.0   APTT 29.9     CRP:   Recent Labs   Lab 09/06/24  0420   .3*     Lactic Acid: No results for input(s): "LACTATE" in the last 48 hours.  Prealbumin:   Recent Labs   Lab 09/06/24  0420   PREALBUMIN 3*     Urine Studies:   Recent Labs   Lab 09/06/24  0254   COLORU Yellow   APPEARANCEUA Hazy*   PHUR 6.0   SPECGRAV 1.030   PROTEINUA 1+*   GLUCUA 4+*   KETONESU Trace*   BILIRUBINUA Negative   OCCULTUA Trace*   NITRITE Negative   LEUKOCYTESUR Negative   RBCUA 2   WBCUA 0   BACTERIA Occasional   SQUAMEPITHEL 1   HYALINECASTS 0     All pertinent labs within the past 24 hours have been reviewed.    Significant Imaging: I have reviewed and interpreted all pertinent imaging " results/findings.  XR r ankle:  Hardware appears intact.  No evidence of acute fracture or dislocation.  Soft tissue edema present  Assessment/Plan:     Post op infection  Cortes Schroeder is a 64 y.o. male with PMH of DM, HTN  and right trimalleolar ankle fracture status post ex fix on 07/18 with subsequent definitive fixation on 07/26 presenting with right ankle wound and concern for sepsis.  Patient brought to ED for SOB by a roommate as you noticed mental status change yesterday afternoon.  Upon presentation, patient afebrile, tachycardic and hypotensive.  Sepsis protocol initiated.  Patient received clindamycin and vancomycin in the ED. WBC 18.68, .3.  On exam patient has draining wound over the medial side of the right ankle as well as eschar formation over the lateral side of the ankle.  Right ankle and foot erythematous and edematous consistent with postoperative infection.    - patient admitted to hospital medicine  - ABX per primary  - plan for I and D of right ankle today  - patient marked booked consented for surgery  - NPO since yesterday              SONA Price MD  Orthopedics  [facility]

## 2024-09-06 NOTE — H&P
[facility] - Emergency Dept  [facility] Medicine  History & Physical    Patient Name: Cortes Schroeder  MRN: [id number]  Patient Class: IP- Inpatient  Admission Date: 9/6/2024  Attending Physician: Filemon Miner MD   Primary Care Provider: Andrew Sinclair Jr., MD         Patient information was obtained from patient, relative(s), and ER records.     Subjective:     Principal Problem:Sepsis    Chief Complaint:   Chief Complaint   Patient presents with    Altered Mental Status     Had orthopedic surgery on right leg, redness and swelling noted to surgical site. According to family, the pt is not acting himself, fidgety, and has not eaten in 3 days.         HPI:  64 y.o. male presents to the ED w/ complaint of altered mental status. Hx of R ankle fracture with surgery over a month ago. Pt was doing well when he acutely became altered and not acting like himself a few hours ago. Pt family member drove him here from [facility] for ortho. R medial ankle wound dehisced with redness and swelling around it. No CPM fever, cough, N/V/D or seizure     Past Medical History:   Diagnosis Date    Anxiety 12/18/2012    Back pain 12/18/2012    Cataract     Chronic pain syndrome 04/24/2016    Diabetes type 2, controlled 02/20/2016    Diabetic retinopathy     DM (diabetes mellitus) 12/18/2012    DM (diabetes mellitus), type 2, uncontrolled 11/16/2013    Gastroesophageal reflux disease without esophagitis 02/20/2016    Hyperlipidemia 12/18/2012    Insomnia 08/07/2014    Neuropathy 11/16/2013       Past Surgical History:   Procedure Laterality Date    APPLICATION, EXTERNAL FIXATION DEVICE, FOR ANKLE FRACTURE Right 7/18/2024    Procedure: APPLICATION, EXTERNAL FIXATION DEVICE, FOR ANKLE FRACTURE;  Surgeon: Moe Liz MD;  Location: [facility] OR [address] [place] [address];  Service: Orthopedics;  Laterality: Right;    BACK SURGERY  2003    Lumbar Spine    CATARACT EXTRACTION      CLOSED REDUCTION, FRACTURE, ANKLE, TRIMALLEOLAR Right 7/18/2024     Procedure: CLOSED REDUCTION, FRACTURE, ANKLE, TRIMALLEOLAR;  Surgeon: Moe Liz MD;  Location: [facility] OR 2ND FLR;  Service: Orthopedics;  Laterality: Right;    EPIDURAL STEROID INJECTION N/A 1/16/2019    Procedure: Injection, Steroid, Epidural Cervical;  Surgeon: Andrew Sinclair Jr., MD;  Location: [facility] ENDO;  Service: Pain Management;  Laterality: N/A;  Cervical Epidural Steroid Injection C7-T1    70198    Arrive @ 1240    EPIDURAL STEROID INJECTION N/A 2/20/2019    Procedure: Injection, Steroid, Epidural;  Surgeon: Andrew Sinclair Jr., MD;  Location: [facility] ENDO;  Service: Pain Management;  Laterality: N/A;  Lumbar Epidural Steroid Injection L4-5    64822    Arrive @ 1215 (requests latest time)    FIXATION OF SYNDESMOSIS OF ANKLE Right 7/26/2024    Procedure: FIXATION, SYNDESMOSIS, ANKLE;  Surgeon: Moe Liz MD;  [facility];  Service: Orthopedics;  Laterality: Right;    INJECTION, SPINE, LUMBOSACRAL, TRANSFORAMINAL APPROACH Bilateral 6/14/2024    Procedure: Bilateral L5 Transforaminal Epidural Steroid Injections;  Surgeon: Andrew Sinclair Jr., MD;  Location: [facility] PAIN MANAGEMENT;  Service: Pain Management;  Laterality: Bilateral;  @1300(given)  ASA last 6/8  Check BG  MD Sign.    OPEN REDUCTION AND INTERNAL FIXATION (ORIF) OF FRACTURE OF DISTAL RADIUS Left 7/19/2024    Procedure: ORIF, FRACTURE, RADIUS, DISTAL - LEFT;  Surgeon: Moe Liz MD;  Location: [facility] OR [facility] FLR;  Service: Orthopedics;  Laterality: Left;  LEFT, SYNTHES, C ARM    OPEN REDUCTION AND INTERNAL FIXATION (ORIF) OF INJURY OF ANKLE Right 7/26/2024    Procedure: ORIF, ANKLE;  Surgeon: Moe Liz MD;  Location: [facility] OR 2ND FLR;  Service: Orthopedics;  Laterality: Right;    REMOVAL OF EXTERNAL FIXATION DEVICE Right 7/26/2024    Procedure: REMOVAL, EXTERNAL FIXATION DEVICE;  Surgeon: Moe Liz MD;  Location: [facility] OR 2ND FLR;  Service: Orthopedics;  Laterality: Right;       Review of  patient's allergies indicates:   Allergen Reactions    Invokana [canagliflozin] Anaphylaxis    Percocet [oxycodone-acetaminophen] Nausea Only and Hallucinations    Biaxin [clarithromycin]     Hydrocodone Other (See Comments)     Dizzy/nausea/hallucinations    Sulfa (sulfonamide antibiotics) Nausea Only and Rash       Current Facility-Administered Medications on File Prior to Encounter   Medication    [DISCONTINUED] GENERIC EXTERNAL MEDICATION    [DISCONTINUED] GENERIC EXTERNAL MEDICATION     Current Outpatient Medications on File Prior to Encounter   Medication Sig    acetaminophen (TYLENOL) 325 MG tablet Take 2 tablets (650 mg total) by mouth every 6 (six) hours.    aspirin (ECOTRIN) 81 MG EC tablet Take 1 tablet (81 mg total) by mouth 2 (two) times a day. End date sept 20, 2024    atorvastatin (LIPITOR) 40 MG tablet Take 40 mg by mouth once daily.    blood-glucose sensor (DEXCOM G6 SENSOR) Omaira Change sensor every 10 days    blood-glucose transmitter (DEXCOM G6 TRANSMITTER) Omaira Change transmitter every 3 months    celecoxib (CELEBREX) 200 MG capsule Take 1 capsule (200 mg total) by mouth once daily.    cyanocobalamin (VITAMIN B-12) 1000 MCG tablet Take 1,000 mcg by mouth once daily.    DULoxetine (CYMBALTA) 60 MG capsule Take 1 capsule (60 mg total) by mouth once daily.    emtricitabine-tenofovir 200-300 mg (TRUVADA) 200-300 mg Tab Take 1 tablet by mouth once daily.    enoxaparin (LOVENOX) 40 mg/0.4 mL Syrg Inject 40 mg into the skin Daily.    fish oil-omega-3 fatty acids 300-1,000 mg capsule Take 1 capsule by mouth 2 (two) times daily.    gabapentin (NEURONTIN) 300 MG capsule Take 2 capsules (600 mg total) by mouth once daily AND 4 capsules (1,200 mg total) every evening.    HUMALOG U-100 INSULIN 100 unit/mL injection 1:20 U PRN high blood sugar and snacks. Correction dose- Enter carb coverage intake upon administration  Target number 120 Carbohydrate coverage #1 1:2 Carbohydrate coverage #1 time [id number]  Carbohydrate coverage #2 1:3 Carbohydrate coverage #2 time [id number] Carbohydrate coverage #3 Carbohydrate coverage #3 time Carbohydrate coverage #4 Carbohydrate coverage #4 time Sensitivity #1 1:20 Sensitivity #1 time 0514-1231 Sensitivity #2 Sensitivity #2 time Sensitivity #3 Sensitivity #3 time Sensitivity #4 Sensitivity #4 time Sensitivity #5 Sensitivity #5 time Order Questions Question Answer Comment       50 U SQ continuous  Target number 120 Basal Rate #1 2.3 Basal rate #1 time 8051-1032 Basal Rate #2 1.55 Basal rate #2 time 5462-0698 Basal rate #3 Basal rate #3 time Basal rate #4 Basal rate #4 time Basal rate #5 Basal rate #5 time    losartan (COZAAR) 25 MG tablet Take 1 tablet (25 mg total) by mouth once daily.    magnesium oxide (MAG-OX) 400 mg (241.3 mg magnesium) tablet Take 0.5 tablets (200 mg total) by mouth once daily.    melatonin (MELATIN) 3 mg tablet Take 2 tablets (6 mg total) by mouth nightly as needed for Insomnia.    methocarbamoL (ROBAXIN) 500 MG Tab Take 1 tablet (500 mg total) by mouth 4 (four) times daily as needed (pain scale 4-7).    morphine (MSIR) 15 MG tablet Take 1 tablet (15 mg total) by mouth every 4 (four) hours as needed (pain scale 6-10).    MOUNJARO 10 mg/0.5 mL PnIj Inject 10 mg into the skin once a week. HOLD until all surgeries completed and out of rehab    nortriptyline (PAMELOR) 50 MG capsule Take 1 capsule (50 mg total) by mouth nightly.    ondansetron (ZOFRAN-ODT) 4 MG TbDL Take 2 tablets (8 mg total) by mouth every 6 (six) hours as needed (nausea).    polyethylene glycol (GLYCOLAX) 17 gram PwPk Take 17 g by mouth once daily.    rosuvastatin (CRESTOR) 10 MG tablet Take 10 mg by mouth every evening.    senna-docusate 8.6-50 mg (PERICOLACE) 8.6-50 mg per tablet Take 1 tablet by mouth 2 (two) times daily.    vitamin D 1000 units Tab Take 1,000 Units by mouth 2 (two) times daily.    [DISCONTINUED] insulin aspart U-100 (NOVOLOG U-100 INSULIN ASPART) 100 unit/mL injection Use  in insulin pump. Max daily dose 150 units.     Family History       Problem Relation (Age of Onset)    Alzheimer's disease Father    Cataracts Father    Heart attack Mother    Hypertension Father, Mother    Migraines Mother    Parkinsonism Father          Tobacco Use    Smoking status: Never    Smokeless tobacco: Never   Substance and Sexual Activity    Alcohol use: Yes     Alcohol/week: 1.0 standard drink of alcohol     Types: 1 Glasses of wine per week     Comment: occasionally    Drug use: No    Sexual activity: Not Currently     Partners: Male     Birth control/protection: Condom     Comment: 10/2/17      Review of Systems   Unable to perform ROS: Mental status change     Objective:     Vital Signs (Most Recent):  Temp: 98 °F (36.7 °C) (09/06/24 0330)  Pulse: 105 (09/06/24 0330)  Resp: 17 (09/06/24 0330)  BP: (!) 124/59 (09/06/24 0330)  SpO2: 99 % (09/06/24 0330) Vital Signs (24h Range):  Temp:  [98 °F (36.7 °C)-98.7 °F (37.1 °C)] 98 °F (36.7 °C)  Pulse:  [105-110] 105  Resp:  [17-62] 17  SpO2:  [93 %-100 %] 99 %  BP: (104-131)/(50-67) 124/59     Weight: 93 kg (205 lb)  Body mass index is 34.11 kg/m².     Physical Exam  Constitutional:       Appearance: Normal appearance.   HENT:      Head: Normocephalic and atraumatic.      Mouth/Throat:      Mouth: Mucous membranes are moist.   Cardiovascular:      Rate and Rhythm: Normal rate and regular rhythm.      Pulses: Normal pulses.      Heart sounds: Normal heart sounds. No murmur heard.     No gallop.   Pulmonary:      Effort: Pulmonary effort is normal.      Breath sounds: Normal breath sounds. No rales.   Abdominal:      General: Abdomen is flat. Bowel sounds are normal. There is no distension.      Palpations: Abdomen is soft.      Tenderness: There is no abdominal tenderness.   Musculoskeletal:         General: No swelling. Normal range of motion.      Cervical back: Normal range of motion.   Skin:     General: Skin is warm.   Neurological:      General: No  focal deficit present.      Mental Status: He is disoriented.                Significant Labs: All pertinent labs within the past 24 hours have been reviewed.    Significant Imaging: I have reviewed all pertinent imaging results/findings within the past 24 hours.  Assessment/Plan:     * Sepsis  This patient does have evidence of infective focus  My overall impression is sepsis.  Source: Skin and Soft Tissue (location ankle)  Antibiotics given-   Antibiotics (72h ago, onward)      Start     Stop Route Frequency Ordered    09/06/24 0300  vancomycin (VANCOCIN) 2,250 mg in D5W 500 mL IVPB  (Sepsis Workup)         -- IV Once 09/06/24 0230    09/06/24 0245  clindamycin in D5W 900 mg/50 mL IVPB 900 mg  (Sepsis Workup)         09/07/24 0229 IV Every 8 hours (non-standard times) 09/06/24 0230          Latest lactate reviewed-  Recent Labs   Lab 09/06/24  0242   POCLAC 2.68*     Organ dysfunction indicated by Acute kidney injury and Encephalopathy    Fluid challenge Actual Body weight- Patient will receive 30ml/kg actual body weight to calculate fluid bolus for treatment of septic shock.     Post- resuscitation assessment Yes Perfusion exam was performed within 6 hours of septic shock presentation after bolus shows Adequate tissue perfusion assessed by non-invasive monitoring       Will Not start Pressors- Levophed for MAP of 65  Source control achieved by: iv antibiotics , f/u with blood cx     Hyponatremia  Hyponatremia is likely due to Dehydration/hypovolemia. The patient's most recent sodium results are listed below.  Recent Labs     09/06/24  0235   *     Plan  - Correct the sodium by 4-6mEq in 24 hours.   - Obtain the following studies: Urine sodium, urine osmolality, serum osmolality.  - Will treat the hyponatremia with IV fluids as follows: NS  - Monitor sodium Daily.   - Patient hyponatremia is worsening. Will continue current treatment      Closed trimalleolar fracture of right ankle  S/p ORIF 7/2024  Ortho  "consulted       DEEPALI (acute kidney injury)  DEEPALI is likely due to acute tubular necrosis caused by sepsis . Baseline creatinine is  1 . Most recent creatinine and eGFR are listed below.  Recent Labs     09/06/24  0235   CREATININE 1.7*   EGFRNORACEVR 44.7*      Plan  - DEEPALI is worsening. Will continue current treatment  - Avoid nephrotoxins and renally dose meds for GFR listed above  - Monitor urine output, serial BMP, and adjust therapy as needed      Uncontrolled type 2 diabetes mellitus with diabetic polyneuropathy, with long-term current use of insulin  Patient's FSGs are uncontrolled due to hyperglycemia on current medication regimen.  Last A1c reviewed-   Lab Results   Component Value Date    LABA1C 10.8 (H) 08/15/2016    HGBA1C 9.9 (H) 07/18/2024     Most recent fingerstick glucose reviewed- No results for input(s): "POCTGLUCOSE" in the last 24 hours.  Current correctional scale  Low  Maintain anti-hyperglycemic dose as follows-   Antihyperglycemics (From admission, onward)      Start     Stop Route Frequency Ordered    09/06/24 0506  insulin aspart U-100 pen 0-5 Units         -- SubQ Every 6 hours PRN 09/06/24 0406          Hold Oral hypoglycemics while patient is in the hospital.      VTE Risk Mitigation (From admission, onward)      None                            Lorena Miguel MD  Department of Hospital Medicine  [facility] - Emergency Dept          "

## 2024-09-06 NOTE — ED TRIAGE NOTES
Cortes Schroeder, a 64 y.o. male presents to the ED w/ complaint of altered mental status. Hx of R ankle fracture with surgery over a month ago. Pt was doing well when he acutely became altered and not acting like himself a few hours ago. Pt family member drove him here from [facility] for ortho. R medial ankle wound dehisced with redness and swelling around it.      Triage note:  Chief Complaint   Patient presents with    Altered Mental Status     Had orthopedic surgery on right leg, redness and swelling noted to surgical site. According to family, the pt is not acting himself, fidgety, and has not eaten in 3 days.      Review of patient's allergies indicates:   Allergen Reactions    Invokana [canagliflozin] Anaphylaxis    Percocet [oxycodone-acetaminophen] Nausea Only and Hallucinations    Biaxin [clarithromycin]     Hydrocodone Other (See Comments)     Dizzy/nausea/hallucinations    Sulfa (sulfonamide antibiotics) Nausea Only and Rash     Past Medical History:   Diagnosis Date    Anxiety 12/18/2012    Back pain 12/18/2012    Cataract     Chronic pain syndrome 04/24/2016    Diabetes type 2, controlled 02/20/2016    Diabetic retinopathy     DM (diabetes mellitus) 12/18/2012    DM (diabetes mellitus), type 2, uncontrolled 11/16/2013    Gastroesophageal reflux disease without esophagitis 02/20/2016    Hyperlipidemia 12/18/2012    Insomnia 08/07/2014    Neuropathy 11/16/2013

## 2024-09-06 NOTE — PROGRESS NOTES
Tristin Medel - Surgery ([facility])  [facility] Medicine  Progress Note    Patient Name: Cortes Schroeder  MRN: [id number]  Patient Class: IP- Inpatient   Admission Date: 9/6/2024  Length of Stay: 0 days  Attending Physician: Filemon Miner MD  Primary Care Provider: Andrew Sinclair Jr., MD        Subjective:     Principal Problem:Surgical wound breakdown        HPI:  Cortes Schroeder is a 64 y.o. M with PMHx of anxiety, T2DM, GERD, HLD, HTN who presented to ED for AMS. He had a fall in July that resulted in R trimalleolar fracture and L distal radius fracture. He had external fixation on 07/18 and then ORIF on 07/26 with Dr. Liz. He was prescribed clinda 300 mg on 08/28 for a ten day course. Patient was doing well when he acutely became altered and not acting like himself a few hours ago. Patient family member drove him here from [facility] for ortho consult. R medial ankle wound dehisced with redness and swelling around it. No CPM fever, cough, N/V/D or seizure.    In ED: T max 99.1. SIRS 2/4 with WBC 18 and . CMP notable for Na 127, Cr 1.7, . Phos 1.9. .3. BNP 73. Trop neg. A1c 8.5. R tib fib XR notes There are 2 abandoned anterior-posteriorly oriented pin tracks in the right mid tibial shaft.  On AP views, each of these pin tracks demonstrates a small faint internal density, possibly representing a sequestrum. Ortho and endocrine consulted. Given IV vanc and IV clindamycin. Given 2.7L IVFs. Admitted to hospital medicine for sepsis 2/2 infected hardware.     Overview/Hospital Course:  Cortes Schroeder is a 63 yo M admitted to hospital medicine for sepsis 2/2 R ankle infection s/p ORIF on 07/26. Started on IV vanc and cefepime. Given IVFs. Blood cultures pending. Brought to OR with ortho on 09/06 for irrigation debridement of RLE. Endocrine following for BG. ID consulted as suspect patient will need long course of abx; agree with vanc and cefepime for now. Will follow cultures. Plan to  discharge home with  when medically ready.     Interval History: Seen this AM. Afebrile, but tachycardic and tachypneic with rigors on exam. Complaining of chills and pain to RLE. Changed abx to IV vanc and cefepime. Giving additional IVFs. Ortho taking to OR today for debridement. ID consulted.     Review of Systems   Constitutional:  Positive for activity change and chills. Negative for fever.   Respiratory:  Negative for chest tightness and shortness of breath.    Cardiovascular:  Negative for chest pain and leg swelling.   Gastrointestinal:  Negative for abdominal pain and nausea.   Musculoskeletal:  Positive for arthralgias.   Skin:  Positive for color change and wound.   Neurological:  Negative for dizziness and weakness.     Objective:     Vital Signs (Most Recent):  Temp: 97.7 °F (36.5 °C) (09/06/24 1705)  Pulse: 60 (09/06/24 1715)  Resp: 14 (09/06/24 1715)  BP: (!) 88/49 (09/06/24 1715)  SpO2: 100 % (09/06/24 1715) Vital Signs (24h Range):  Temp:  [97.7 °F (36.5 °C)-99.9 °F (37.7 °C)] 97.7 °F (36.5 °C)  Pulse:  [] 60  Resp:  [14-62] 14  SpO2:  [93 %-100 %] 100 %  BP: ()/(49-83) 88/49     Weight: 93 kg (205 lb)  Body mass index is 34.11 kg/m².    Intake/Output Summary (Last 24 hours) at 9/6/2024 1723  Last data filed at 9/6/2024 1705  Gross per 24 hour   Intake 4390 ml   Output --   Net 4390 ml         Physical Exam  Vitals and nursing note reviewed.   Constitutional:       Appearance: He is well-developed. He is ill-appearing.      Comments: Rigors    Eyes:      Pupils: Pupils are equal, round, and reactive to light.   Cardiovascular:      Rate and Rhythm: Regular rhythm. Tachycardia present.   Pulmonary:      Effort: Pulmonary effort is normal.      Breath sounds: Normal breath sounds.      Comments: tachypneic  Abdominal:      Palpations: Abdomen is soft.      Tenderness: There is no abdominal tenderness.   Musculoskeletal:         General: No tenderness.      Right lower leg: Edema present.  "     Left lower leg: No edema.   Skin:     General: Skin is warm and dry.      Comments: Wounds to RLE with intact dressing. Increased warmth and swelling around dressing. See images below    Neurological:      Mental Status: He is alert and oriented to person, place, and time.      Comments: Oriented to person, place, year, and situation. Not month   Psychiatric:         Behavior: Behavior normal.             Significant Labs: All pertinent labs within the past 24 hours have been reviewed.  CBC:   Recent Labs   Lab 09/06/24  0235 09/06/24  0242   WBC 18.68*  --    HGB 11.2*  --    HCT 32.8* 36     --      CMP:   Recent Labs   Lab 09/06/24  0235   *   K 4.4   CL 95   CO2 19*   *   BUN 38*   CREATININE 1.7*   CALCIUM 9.4   PROT 7.0   ALBUMIN 2.4*   BILITOT 0.6   ALKPHOS 242*   AST 42*   ALT 32   ANIONGAP 13     Lactic Acid: No results for input(s): "LACTATE" in the last 48 hours.    Significant Imaging: I have reviewed all pertinent imaging results/findings within the past 24 hours.    Assessment/Plan:      * Surgical wound breakdown  Closed trimalleolar fracture of right ankle s/p ORIF in July  65 yo M presenting with AMS and worsening redness, swelling and pain to R ankle.     - continue IV vanc and cefepime  - Ortho consulted;   - taken to OR today for debridement of surgical wound   - follow wound cultures  - follow blood cultures  - ID following     Sepsis  This patient does have evidence of infective focus  My overall impression is sepsis.  Source: Skin and Soft Tissue (location ankle)  Antibiotics given-   Antibiotics (72h ago, onward)      Start     Stop Route Frequency Ordered    09/06/24 2130  ceFEPIme (MAXIPIME) 2 g in D5W 100 mL IVPB (MB+)         -- IV Every 12 hours (non-standard times) 09/06/24 1651    09/06/24 1249  mupirocin 2 % ointment         -- Nasl On Call Procedure 09/06/24 1249    09/06/24 1249  ceFAZolin 2 g in D5W 50 mL IVPB (MB+)         -- IV On Call Procedure 09/06/24 " "1249    09/06/24 0911  vancomycin - pharmacy to dose  (vancomycin IVPB (PEDS and ADULTS))        Placed in "And" Linked Group    -- IV pharmacy to manage frequency 09/06/24 0811          Latest lactate reviewed-  Recent Labs   Lab 09/06/24  0536   POCLAC 1.23       Organ dysfunction indicated by Acute kidney injury and Encephalopathy    Fluid challenge Actual Body weight- Patient will receive 30ml/kg actual body weight to calculate fluid bolus for treatment of septic shock.     Post- resuscitation assessment Yes Perfusion exam was performed within 6 hours of septic shock presentation after bolus shows Adequate tissue perfusion assessed by non-invasive monitoring       Will Not start Pressors-   Source control achieved by: iv antibiotics , f/u with blood cx     Hyponatremia  Hyponatremia is likely due to Dehydration/hypovolemia. The patient's most recent sodium results are listed below.  Recent Labs     09/06/24  0235   *       Plan  - Correct the sodium by 4-6mEq in 24 hours.   - Obtain the following studies: Urine sodium, urine osmolality, serum osmolality.  - Will treat the hyponatremia with IV fluids as follows: NS  - Monitor sodium Daily.   - Patient hyponatremia is worsening. Will continue current treatment    Uncontrolled diabetes mellitus with hyperglycemia  Patient's FSGs are not controlled on current hypoglycemics.   Last A1c reviewed-   Lab Results   Component Value Date    LABA1C 10.8 (H) 08/15/2016    HGBA1C 8.5 (H) 09/06/2024     Most recent fingerstick glucose reviewed-   Recent Labs   Lab 09/06/24  0922 09/06/24  1220 09/06/24  1702   POCTGLUCOSE 216* 201* 256*     Current correctional scale  Low  Maintain anti-hyperglycemic dose as follows-   Antihyperglycemics (From admission, onward)      Start     Stop Route Frequency Ordered    09/06/24 1000  insulin glargine U-100 (Lantus) pen 5 Units         -- SubQ Daily 09/06/24 0954    09/06/24 0740  insulin aspart U-100 pen 0-10 Units         -- SubQ " "Before meals & nightly PRN 09/06/24 0640    09/06/24 0506  insulin aspart U-100 pen 0-5 Units         -- SubQ Every 6 hours PRN 09/06/24 0406           - Endocrine consulted  - hold oral antihyperglycemics during hospitalization   - needs better BG control for optimal wound healing     Closed trimalleolar fracture of right ankle  S/p ORIF 07/2024  Ortho consulted   - see surgical wound breakdown above    DEEPALI (acute kidney injury)  DEEPALI is likely due to acute tubular necrosis caused by sepsis . Baseline creatinine is  1 . Most recent creatinine and eGFR are listed below.  Recent Labs     09/06/24  0235   CREATININE 1.7*   EGFRNORACEVR 44.7*        Plan  - DEEPALI is worsening. Will continue current treatment  - Avoid nephrotoxins and renally dose meds for GFR listed above  - Monitor urine output, serial BMP, and adjust therapy as needed  - given 2.7 L IVFs in ED; will give additional fluids today     Uncontrolled type 2 diabetes mellitus with diabetic polyneuropathy, with long-term current use of insulin  Patient's FSGs are uncontrolled due to hyperglycemia on current medication regimen.  Last A1c reviewed-   Lab Results   Component Value Date    LABA1C 10.8 (H) 08/15/2016    HGBA1C 9.9 (H) 07/18/2024     Most recent fingerstick glucose reviewed- No results for input(s): "POCTGLUCOSE" in the last 24 hours.  Current correctional scale  Low  Maintain anti-hyperglycemic dose as follows-   Antihyperglycemics (From admission, onward)      Start     Stop Route Frequency Ordered    09/06/24 0506  insulin aspart U-100 pen 0-5 Units         -- SubQ Every 6 hours PRN 09/06/24 0406          Hold Oral hypoglycemics while patient is in the hospital.      VTE Risk Mitigation (From admission, onward)           Ordered     IP VTE HIGH RISK PATIENT  Once         09/06/24 1249     Place sequential compression device  Until discontinued         09/06/24 1249                    Discharge Planning   JAYLEEN: 9/10/2024     Code Status: Full Code "   Is the patient medically ready for discharge?:     Reason for patient still in hospital (select all that apply): Patient trending condition, Treatment, and Consult recommendations                     Jennifer Ochoa PA-C  Department of Hospital Medicine   [facility] Surgery ([facility])

## 2024-09-06 NOTE — HPI
Cortes Schroeder is a 64 y.o. M with PMHx of anxiety, T2DM, GERD, HLD, HTN who presented to ED for AMS. He had a fall in July that resulted in R trimalleolar fracture and L distal radius fracture. He had external fixation on 07/18 and then ORIF on 07/26 with Dr. Liz. He was prescribed clinda 300 mg on 08/28 for a ten day course. Patient was doing well when he acutely became altered and not acting like himself a few hours ago. Patient family member drove him here from [facility] for ortho consult. R medial ankle wound dehisced with redness and swelling around it. No CPM fever, cough, N/V/D or seizure.    In ED: T max 99.1. SIRS 2/4 with WBC 18 and . CMP notable for Na 127, Cr 1.7, . Phos 1.9. .3. BNP 73. Trop neg. A1c 8.5. R tib fib XR notes There are 2 abandoned anterior-posteriorly oriented pin tracks in the right mid tibial shaft.  On AP views, each of these pin tracks demonstrates a small faint internal density, possibly representing a sequestrum. Ortho and endocrine consulted. Given IV vanc and IV clindamycin. Given 2.7L IVFs. Admitted to hospital medicine for sepsis 2/2 infected hardware.

## 2024-09-06 NOTE — ASSESSMENT & PLAN NOTE
This patient does have evidence of infective focus  My overall impression is sepsis.  Source: Skin and Soft Tissue (location ankle)  Antibiotics given-   Antibiotics (72h ago, onward)      Start     Stop Route Frequency Ordered    09/06/24 0300  vancomycin (VANCOCIN) 2,250 mg in D5W 500 mL IVPB  (Sepsis Workup)         -- IV Once 09/06/24 0230    09/06/24 0245  clindamycin in D5W 900 mg/50 mL IVPB 900 mg  (Sepsis Workup)         09/07/24 0229 IV Every 8 hours (non-standard times) 09/06/24 0230          Latest lactate reviewed-  Recent Labs   Lab 09/06/24  0242   POCLAC 2.68*     Organ dysfunction indicated by Acute kidney injury and Encephalopathy    Fluid challenge Actual Body weight- Patient will receive 30ml/kg actual body weight to calculate fluid bolus for treatment of septic shock.     Post- resuscitation assessment Yes Perfusion exam was performed within 6 hours of septic shock presentation after bolus shows Adequate tissue perfusion assessed by non-invasive monitoring       Will Not start Pressors- Levophed for MAP of 65  Source control achieved by: iv antibiotics , f/u with blood cx

## 2024-09-06 NOTE — ED NOTES
Assumed care of patient at this time. Pt placed in hospital gown and currently lying in bed. Vital signs are stable, provided pt with warm blanket. Pt denied restroom use. No other complaints from pt at this time. Care ongoing.

## 2024-09-06 NOTE — SUBJECTIVE & OBJECTIVE
Past Medical History:   Diagnosis Date    Anxiety 12/18/2012    Back pain 12/18/2012    Cataract     Chronic pain syndrome 04/24/2016    Diabetes type 2, controlled 02/20/2016    Diabetic retinopathy     DM (diabetes mellitus) 12/18/2012    DM (diabetes mellitus), type 2, uncontrolled 11/16/2013    Gastroesophageal reflux disease without esophagitis 02/20/2016    Hyperlipidemia 12/18/2012    Insomnia 08/07/2014    Neuropathy 11/16/2013       Past Surgical History:   Procedure Laterality Date    APPLICATION, EXTERNAL FIXATION DEVICE, FOR ANKLE FRACTURE Right 7/18/2024    Procedure: APPLICATION, EXTERNAL FIXATION DEVICE, FOR ANKLE FRACTURE;  Surgeon: Moe Liz MD;  Location: [facility] OR [address] [place] [address];  Service: Orthopedics;  Laterality: Right;    BACK SURGERY  2003    Lumbar Spine    CATARACT EXTRACTION      CLOSED REDUCTION, FRACTURE, ANKLE, TRIMALLEOLAR Right 7/18/2024    Procedure: CLOSED REDUCTION, FRACTURE, ANKLE, TRIMALLEOLAR;  Surgeon: Moe Liz MD;  Location: [facility] OR [address] [place] [address];  Service: Orthopedics;  Laterality: Right;    EPIDURAL STEROID INJECTION N/A 1/16/2019    Procedure: Injection, Steroid, Epidural Cervical;  Surgeon: Andrew Sinclair Jr., MD;  Location: [facility];  Service: Pain Management;  Laterality: N/A;  Cervical Epidural Steroid Injection C7-T1    [id number]    Arrive @ 1240    EPIDURAL STEROID INJECTION N/A 2/20/2019    Procedure: Injection, Steroid, Epidural;  Surgeon: Andrew Sinclair Jr., MD;  Location: [facility] ENDO;  Service: Pain Management;  Laterality: N/A;  Lumbar Epidural Steroid Injection L4-5    [id number]    Arrive @ 1215 (requests latest time)    FIXATION OF SYNDESMOSIS OF ANKLE Right 7/26/2024    Procedure: FIXATION, SYNDESMOSIS, ANKLE;  Surgeon: Moe Liz MD;  Location: [facility] OR [address] [place] [address];  Service: Orthopedics;  Laterality: Right;    INJECTION, SPINE, LUMBOSACRAL, TRANSFORAMINAL APPROACH Bilateral 6/14/2024    Procedure: Bilateral L5 Transforaminal Epidural Steroid  Injections;  Surgeon: Andrew Sinclair Jr., MD;  Location: [facility] PAIN MANAGEMENT;  Service: Pain Management;  Laterality: Bilateral;  @1300(given)  ASA last 6/8  Check BG  MD Sign.    OPEN REDUCTION AND INTERNAL FIXATION (ORIF) OF FRACTURE OF DISTAL RADIUS Left 7/19/2024    Procedure: ORIF, FRACTURE, RADIUS, DISTAL - LEFT;  Surgeon: Moe Liz MD;  Location: [facility] OR [address] [place] [address];  Service: Orthopedics;  Laterality: Left;  LEFT, SYNTHES, C ARM    OPEN REDUCTION AND INTERNAL FIXATION (ORIF) OF INJURY OF ANKLE Right 7/26/2024    Procedure: ORIF, ANKLE;  Surgeon: Moe Liz MD;  [facility];  Service: Orthopedics;  Laterality: Right;    REMOVAL OF EXTERNAL FIXATION DEVICE Right 7/26/2024    Procedure: REMOVAL, EXTERNAL FIXATION DEVICE;  Surgeon: Moe Liz MD;  [facility];  Service: Orthopedics;  Laterality: Right;       Review of patient's allergies indicates:   Allergen Reactions    Invokana [canagliflozin] Anaphylaxis    Percocet [oxycodone-acetaminophen] Nausea Only and Hallucinations    Biaxin [clarithromycin]     Hydrocodone Other (See Comments)     Dizzy/nausea/hallucinations    Sulfa (sulfonamide antibiotics) Nausea Only and Rash       Current Facility-Administered Medications on File Prior to Encounter   Medication    [DISCONTINUED] GENERIC EXTERNAL MEDICATION    [DISCONTINUED] GENERIC EXTERNAL MEDICATION     Current Outpatient Medications on File Prior to Encounter   Medication Sig    acetaminophen (TYLENOL) 325 MG tablet Take 2 tablets (650 mg total) by mouth every 6 (six) hours.    aspirin (ECOTRIN) 81 MG EC tablet Take 1 tablet (81 mg total) by mouth 2 (two) times a day. End date sept 20, 2024    atorvastatin (LIPITOR) 40 MG tablet Take 40 mg by mouth once daily.    blood-glucose sensor (DEXCOM G6 SENSOR) Omaira Change sensor every 10 days    blood-glucose transmitter (DEXCOM G6 TRANSMITTER) Omaira Change transmitter every 3 months    celecoxib (CELEBREX) 200  MG capsule Take 1 capsule (200 mg total) by mouth once daily.    cyanocobalamin (VITAMIN B-12) 1000 MCG tablet Take 1,000 mcg by mouth once daily.    DULoxetine (CYMBALTA) 60 MG capsule Take 1 capsule (60 mg total) by mouth once daily.    emtricitabine-tenofovir 200-300 mg (TRUVADA) 200-300 mg Tab Take 1 tablet by mouth once daily.    enoxaparin (LOVENOX) 40 mg/0.4 mL Syrg Inject 40 mg into the skin Daily.    fish oil-omega-3 fatty acids 300-1,000 mg capsule Take 1 capsule by mouth 2 (two) times daily.    gabapentin (NEURONTIN) 300 MG capsule Take 2 capsules (600 mg total) by mouth once daily AND 4 capsules (1,200 mg total) every evening.    HUMALOG U-100 INSULIN 100 unit/mL injection 1:20 U PRN high blood sugar and snacks. Correction dose- Enter carb coverage intake upon administration  Target number 120 Carbohydrate coverage #1 1:2 Carbohydrate coverage #1 time 8575-3152 Carbohydrate coverage #2 1:3 Carbohydrate coverage #2 time 7889-4026 Carbohydrate coverage #3 Carbohydrate coverage #3 time Carbohydrate coverage #4 Carbohydrate coverage #4 time Sensitivity #1 1:20 Sensitivity #1 time 1711-4708 Sensitivity #2 Sensitivity #2 time Sensitivity #3 Sensitivity #3 time Sensitivity #4 Sensitivity #4 time Sensitivity #5 Sensitivity #5 time Order Questions Question Answer Comment       50 U SQ continuous  Target number 120 Basal Rate #1 2.3 Basal rate #1 time 6317-5021 Basal Rate #2 1.55 Basal rate #2 time 0700-2865 Basal rate #3 Basal rate #3 time Basal rate #4 Basal rate #4 time Basal rate #5 Basal rate #5 time    losartan (COZAAR) 25 MG tablet Take 1 tablet (25 mg total) by mouth once daily.    magnesium oxide (MAG-OX) 400 mg (241.3 mg magnesium) tablet Take 0.5 tablets (200 mg total) by mouth once daily.    melatonin (MELATIN) 3 mg tablet Take 2 tablets (6 mg total) by mouth nightly as needed for Insomnia.    methocarbamoL (ROBAXIN) 500 MG Tab Take 1 tablet (500 mg total) by mouth 4 (four) times daily as needed (pain  scale 4-7).    morphine (MSIR) 15 MG tablet Take 1 tablet (15 mg total) by mouth every 4 (four) hours as needed (pain scale 6-10).    MOUNJARO 10 mg/0.5 mL PnIj Inject 10 mg into the skin once a week. HOLD until all surgeries completed and out of rehab    nortriptyline (PAMELOR) 50 MG capsule Take 1 capsule (50 mg total) by mouth nightly.    ondansetron (ZOFRAN-ODT) 4 MG TbDL Take 2 tablets (8 mg total) by mouth every 6 (six) hours as needed (nausea).    polyethylene glycol (GLYCOLAX) 17 gram PwPk Take 17 g by mouth once daily.    rosuvastatin (CRESTOR) 10 MG tablet Take 10 mg by mouth every evening.    senna-docusate 8.6-50 mg (PERICOLACE) 8.6-50 mg per tablet Take 1 tablet by mouth 2 (two) times daily.    vitamin D 1000 units Tab Take 1,000 Units by mouth 2 (two) times daily.    [DISCONTINUED] insulin aspart U-100 (NOVOLOG U-100 INSULIN ASPART) 100 unit/mL injection Use in insulin pump. Max daily dose 150 units.     Family History       Problem Relation (Age of Onset)    Alzheimer's disease Father    Cataracts Father    Heart attack Mother    Hypertension Father, Mother    Migraines Mother    Parkinsonism Father          Tobacco Use    Smoking status: Never    Smokeless tobacco: Never   Substance and Sexual Activity    Alcohol use: Yes     Alcohol/week: 1.0 standard drink of alcohol     Types: 1 Glasses of wine per week     Comment: occasionally    Drug use: No    Sexual activity: Not Currently     Partners: Male     Birth control/protection: Condom     Comment: 10/2/17      Review of Systems   Unable to perform ROS: Mental status change     Objective:     Vital Signs (Most Recent):  Temp: 98 °F (36.7 °C) (09/06/24 0330)  Pulse: 105 (09/06/24 0330)  Resp: 17 (09/06/24 0330)  BP: (!) 124/59 (09/06/24 0330)  SpO2: 99 % (09/06/24 0330) Vital Signs (24h Range):  Temp:  [98 °F (36.7 °C)-98.7 °F (37.1 °C)] 98 °F (36.7 °C)  Pulse:  [105-110] 105  Resp:  [17-62] 17  SpO2:  [93 %-100 %] 99 %  BP: (104-131)/(50-67)  124/59     Weight: 93 kg (205 lb)  Body mass index is 34.11 kg/m².     Physical Exam  Constitutional:       Appearance: Normal appearance.   HENT:      Head: Normocephalic and atraumatic.      Mouth/Throat:      Mouth: Mucous membranes are moist.   Cardiovascular:      Rate and Rhythm: Normal rate and regular rhythm.      Pulses: Normal pulses.      Heart sounds: Normal heart sounds. No murmur heard.     No gallop.   Pulmonary:      Effort: Pulmonary effort is normal.      Breath sounds: Normal breath sounds. No rales.   Abdominal:      General: Abdomen is flat. Bowel sounds are normal. There is no distension.      Palpations: Abdomen is soft.      Tenderness: There is no abdominal tenderness.   Musculoskeletal:         General: No swelling. Normal range of motion.      Cervical back: Normal range of motion.   Skin:     General: Skin is warm.   Neurological:      General: No focal deficit present.      Mental Status: He is disoriented.                Significant Labs: All pertinent labs within the past 24 hours have been reviewed.    Significant Imaging: I have reviewed all pertinent imaging results/findings within the past 24 hours.

## 2024-09-06 NOTE — ASSESSMENT & PLAN NOTE
Closed trimalleolar fracture of right ankle s/p ORIF in July  65 yo M presenting with AMS and worsening redness, swelling and pain to R ankle.     - continue IV vanc and cefepime  - Ortho consulted;   - taken to OR today for debridement of surgical wound   - follow wound cultures  - follow blood cultures  - ID following

## 2024-09-06 NOTE — HOSPITAL COURSE
Cortes Schroeder is a 63 y/o male admitted to hospital medicine for sepsis related to right ankle from surgical wound infection in patient with recent ORIF of right ankle fracture done on 7/26/2024. Patient started on empiric IV Vancomycin and IV Cefepime and given IVF's as per sepsis protocol. Orthopedics consulted and patient taken to OR on 9/6/2024 and had irrigation debridement of right surgical incision and placement of wound vac.Endocrine consulted to assist in management of his diabetes while in hospital. Blood cultures from admit returned with MSSA. Infectious Disease consulted. Wound culture returned positive for Group B streptococcus and MSSA. Echo done due to bacteremia without concern for vegetations. ID changed antibiotics to IV Oxacillin. Patient with new cough and worsening WBC. CTA chest negative for PE but notes small area of ground glass changes to RUL. Patient placed on 5 day course of po Doxycycline to treat as per ID and completed for possible pneumonia. Patient taken back to OR on 09/09/2024 with Ortho and had another irrigation and debridement and replacement of wound vac to right ankle. Plastics consulted for flap evaluation and recommended vascular evaluation. CTA right lower extremity done and showed moderate atherosclerosis and multifocal high grade stenosis throughout right calf arteries. Dietary consulted for malnutrition and started on Boost supplementation to maximize his nutrition for a flap and wound healing. Vascular surgery consulted per Plastics recs as they would like to pre-optimize blood flow prior to any free flap or pedicled propeller flap. Vascular recommended angiogram of right leg but patient developed DEEPALI. Nephrology consulted and suspected ATN related to contrast nephropathy. Patient placed on supportive care. DEEPALI resolved. Patient taken for unilateral angiogram by Vascular on 9/17 and underwent PTA of right posterior tibial artery and right anterior tibial artery. After  revascularization of right leg plan to do flap but working on timing with Ortho and Plastics. Patient to go back to OR again on 9/20 for another irrigation and debridement and replacement of wound vac to right ankle wound by Ortho. Plastics plans propeller flap to right ankle on 9/25 and then will need to remain in hospital for 1 week after flap for dangling protocol and monitoring of flap per Plastics. Repeat blood cultures from 9/8 and 9/10 no growth. Patient taken back to OR on 9/20 with Dr. Moe Liz and had another I&D of right ankle and wound and replacement of wound vac. Patient to remain non weight bearing post-op and Plastics plans flap placement while in hospital on 9/25. Patient to go back to OR on for another I&D and wound vac change on 9/23. Patient taken back to OR on 9/23 with Dr. Liz and had another I&D and wound vac change with Orthopedics. Ortho noted anterolateral foot eschar developing. Distal lateral wound breakdown and ortho opened up majority of lateral wound and excised surrounding skin and subcutaneous tissue. Ortho removed all ankle hardware. Wound vacs placed medially and laterally. Patient re-evaluated by Plastics on 9/25 and state due to lateral wound in addition to anterior wound no longer a candidate for propeller flap and plastic will need to do free flap to cover both wounds. Plastics concerned if no adequate vascular targets, will be unable to cover and BKA is only option. Patient not very happy. Patient went to OR with Plastics on 9/26. Plastics tried multiple attempt for free flap to cover wounds to right ankle but had francisco poor targets and thus unsuccessful in placing free flap to cover ankle wounds. After discussion with patient and Orthopedics plan to proceed for right BKA. Patient taken to OR on 10/1/2024 with Dr. Moe Liz and underwent right lower extremity BKA. Perineural catheter placed by Anesthesia for post-op pain management. Infectious disease made new  recommendations based on BKA and recommended:  Continue IV Oxacillin 12 g q 24 hours continuous infusion until 10/20.  This will complete 6 weeks of IV antibiotics from first negative blood culture - a sufficient duration to treat endocarditis if present.  No need for ROBERTA.  This will also complete the 14 days of post-BKA antibiotics recommended by Orthopedics.    Recommend placement after hosp discharge for PT/OT, ADL assistance, wound care and IV abx - patient to d/c to [facility].  Accepting facility to perform weekly labs (CBC, CMP, CRP), and PICC line dressing changes.  Facility to fax results to [facility] Fax Number: [phone].  PT/OT re consulted post-op after BKA and recommending high intensity therapy. After discussion with patient, inpatient rehab referrals sent. Patient accepted to [facility] which was his first choice and authorization requested. Auth obtained. Order for PICC line placed on 10/3 and plan to discharge to [facility] on 10/4. PICC line placed on 10/3 with no complications. Patient doing well on day of discharge. He was complaining of diffuse swelling and did note to have swelling to hands and lower limbs and scrotum and likely third spacing fluid from all his surgeries and low albumin level. Patient started on Lasix 40 mg po daily to help with his diffuse swelling and to continue on discharge. Patient discharged in good condition to [facility] on 10/4 to continue PT/OT and recovery from recent BKA and continue IV Oxacillin through 10/20 as per ID recs. Patient discharged with Prevena wound vac to BKA site and to remain in place in 1 week and follow-up with Ortho in clinic for removal of wound vac. Pain well controlled to BKA site on discharge.

## 2024-09-06 NOTE — ASSESSMENT & PLAN NOTE
DEEPALI is likely due to acute tubular necrosis caused by sepsis . Baseline creatinine is  1 . Most recent creatinine and eGFR are listed below.  Recent Labs     09/06/24  0235   CREATININE 1.7*   EGFRNORACEVR 44.7*        Plan  - DEEPALI is worsening. Will continue current treatment  - Avoid nephrotoxins and renally dose meds for GFR listed above  - Monitor urine output, serial BMP, and adjust therapy as needed  - given 2.7 L IVFs in ED; will give additional fluids today

## 2024-09-06 NOTE — ASSESSMENT & PLAN NOTE
Cortes Schroeder is a 64 y.o. male with PMH of DM, HTN  and right trimalleolar ankle fracture status post ex fix on 07/18 with subsequent definitive fixation on 07/26 presenting with right ankle wound and concern for sepsis.  Patient brought to ED for SOB by a roommate as you noticed mental status change yesterday afternoon.  Upon presentation, patient afebrile, tachycardic and hypotensive.  Sepsis protocol initiated.  Patient received clindamycin and vancomycin in the ED. WBC 18.68, .3.  On exam patient has draining wound over the medial side of the right ankle as well as eschar formation over the lateral side of the ankle.  Right ankle and foot erythematous and edematous consistent with postoperative infection.    - patient admitted to hospital medicine  - ABX per primary  - plan for I and D of right ankle today  - patient marked booked consented for surgery  - NPO since yesterday

## 2024-09-06 NOTE — SUBJECTIVE & OBJECTIVE
Interval History: Seen this AM. Afebrile, but tachycardic and tachypneic with rigors on exam. Complaining of chills and pain to RLE. Changed abx to IV vanc and cefepime. Giving additional IVFs. Ortho taking to OR today for debridement. ID consulted.     Review of Systems   Constitutional:  Positive for activity change and chills. Negative for fever.   Respiratory:  Negative for chest tightness and shortness of breath.    Cardiovascular:  Negative for chest pain and leg swelling.   Gastrointestinal:  Negative for abdominal pain and nausea.   Musculoskeletal:  Positive for arthralgias.   Skin:  Positive for color change and wound.   Neurological:  Negative for dizziness and weakness.     Objective:     Vital Signs (Most Recent):  Temp: 97.7 °F (36.5 °C) (09/06/24 1705)  Pulse: 60 (09/06/24 1715)  Resp: 14 (09/06/24 1715)  BP: (!) 88/49 (09/06/24 1715)  SpO2: 100 % (09/06/24 1715) Vital Signs (24h Range):  Temp:  [97.7 °F (36.5 °C)-99.9 °F (37.7 °C)] 97.7 °F (36.5 °C)  Pulse:  [] 60  Resp:  [14-62] 14  SpO2:  [93 %-100 %] 100 %  BP: ()/(49-83) 88/49     Weight: 93 kg (205 lb)  Body mass index is 34.11 kg/m².    Intake/Output Summary (Last 24 hours) at 9/6/2024 1723  Last data filed at 9/6/2024 1705  Gross per 24 hour   Intake 4390 ml   Output --   Net 4390 ml         Physical Exam  Vitals and nursing note reviewed.   Constitutional:       Appearance: He is well-developed. He is ill-appearing.      Comments: Rigors    Eyes:      Pupils: Pupils are equal, round, and reactive to light.   Cardiovascular:      Rate and Rhythm: Regular rhythm. Tachycardia present.   Pulmonary:      Effort: Pulmonary effort is normal.      Breath sounds: Normal breath sounds.      Comments: tachypneic  Abdominal:      Palpations: Abdomen is soft.      Tenderness: There is no abdominal tenderness.   Musculoskeletal:         General: No tenderness.      Right lower leg: Edema present.      Left lower leg: No edema.   Skin:      "General: Skin is warm and dry.      Comments: Wounds to RLE with intact dressing. Increased warmth and swelling around dressing. See images below    Neurological:      Mental Status: He is alert and oriented to person, place, and time.      Comments: Oriented to person, place, year, and situation. Not month   Psychiatric:         Behavior: Behavior normal.             Significant Labs: All pertinent labs within the past 24 hours have been reviewed.  CBC:   Recent Labs   Lab 09/06/24  0235 09/06/24  0242   WBC 18.68*  --    HGB 11.2*  --    HCT 32.8* 36     --      CMP:   Recent Labs   Lab 09/06/24  0235   *   K 4.4   CL 95   CO2 19*   *   BUN 38*   CREATININE 1.7*   CALCIUM 9.4   PROT 7.0   ALBUMIN 2.4*   BILITOT 0.6   ALKPHOS 242*   AST 42*   ALT 32   ANIONGAP 13     Lactic Acid: No results for input(s): "LACTATE" in the last 48 hours.    Significant Imaging: I have reviewed all pertinent imaging results/findings within the past 24 hours.  "

## 2024-09-07 PROBLEM — Z79.899 ON PRE-EXPOSURE PROPHYLAXIS FOR HIV: Status: ACTIVE | Noted: 2024-09-07

## 2024-09-07 PROBLEM — R78.81 BACTEREMIA DUE TO GRAM-POSITIVE BACTERIA: Status: ACTIVE | Noted: 2024-09-07

## 2024-09-07 LAB
ANION GAP SERPL CALC-SCNC: 10 MMOL/L (ref 8–16)
ANISOCYTOSIS BLD QL SMEAR: SLIGHT
BASOPHILS # BLD AUTO: 0.06 K/UL (ref 0–0.2)
BASOPHILS NFR BLD: 0.4 % (ref 0–1.9)
BUN SERPL-MCNC: 23 MG/DL (ref 8–23)
BURR CELLS BLD QL SMEAR: ABNORMAL
CALCIUM SERPL-MCNC: 8.5 MG/DL (ref 8.7–10.5)
CHLORIDE SERPL-SCNC: 104 MMOL/L (ref 95–110)
CO2 SERPL-SCNC: 18 MMOL/L (ref 23–29)
CREAT SERPL-MCNC: 1.2 MG/DL (ref 0.5–1.4)
DIFFERENTIAL METHOD BLD: ABNORMAL
EOSINOPHIL # BLD AUTO: 0 K/UL (ref 0–0.5)
EOSINOPHIL NFR BLD: 0.1 % (ref 0–8)
ERYTHROCYTE [DISTWIDTH] IN BLOOD BY AUTOMATED COUNT: 14.7 % (ref 11.5–14.5)
EST. GFR  (NO RACE VARIABLE): >60 ML/MIN/1.73 M^2
GLUCOSE SERPL-MCNC: 193 MG/DL (ref 70–110)
HCT VFR BLD AUTO: 30.6 % (ref 40–54)
HGB BLD-MCNC: 10.1 G/DL (ref 14–18)
IMM GRANULOCYTES # BLD AUTO: 0.15 K/UL (ref 0–0.04)
IMM GRANULOCYTES NFR BLD AUTO: 0.9 % (ref 0–0.5)
LACTATE SERPL-SCNC: 1.6 MMOL/L (ref 0.5–2.2)
LYMPHOCYTES # BLD AUTO: 1 K/UL (ref 1–4.8)
LYMPHOCYTES NFR BLD: 5.6 % (ref 18–48)
MCH RBC QN AUTO: 28.1 PG (ref 27–31)
MCHC RBC AUTO-ENTMCNC: 33 G/DL (ref 32–36)
MCV RBC AUTO: 85 FL (ref 82–98)
MONOCYTES # BLD AUTO: 1.2 K/UL (ref 0.3–1)
MONOCYTES NFR BLD: 7 % (ref 4–15)
NEUTROPHILS # BLD AUTO: 14.7 K/UL (ref 1.8–7.7)
NEUTROPHILS NFR BLD: 86 % (ref 38–73)
NRBC BLD-RTO: 0 /100 WBC
PHOSPHATE SERPL-MCNC: 1.8 MG/DL (ref 2.7–4.5)
PLATELET # BLD AUTO: 359 K/UL (ref 150–450)
PMV BLD AUTO: 10.6 FL (ref 9.2–12.9)
POCT GLUCOSE: 185 MG/DL (ref 70–110)
POCT GLUCOSE: 186 MG/DL (ref 70–110)
POCT GLUCOSE: 189 MG/DL (ref 70–110)
POCT GLUCOSE: 80 MG/DL (ref 70–110)
POIKILOCYTOSIS BLD QL SMEAR: SLIGHT
POLYCHROMASIA BLD QL SMEAR: ABNORMAL
POTASSIUM SERPL-SCNC: 4 MMOL/L (ref 3.5–5.1)
RBC # BLD AUTO: 3.59 M/UL (ref 4.6–6.2)
SODIUM SERPL-SCNC: 132 MMOL/L (ref 136–145)
VANCOMYCIN SERPL-MCNC: 12.4 UG/ML
WBC # BLD AUTO: 17.1 K/UL (ref 3.9–12.7)

## 2024-09-07 PROCEDURE — 25000003 PHARM REV CODE 250

## 2024-09-07 PROCEDURE — 63600175 PHARM REV CODE 636 W HCPCS: Performed by: FAMILY MEDICINE

## 2024-09-07 PROCEDURE — 85025 COMPLETE CBC W/AUTO DIFF WBC: CPT | Performed by: INTERNAL MEDICINE

## 2024-09-07 PROCEDURE — 63600175 PHARM REV CODE 636 W HCPCS

## 2024-09-07 PROCEDURE — 99222 1ST HOSP IP/OBS MODERATE 55: CPT | Mod: FS,,, | Performed by: SURGERY

## 2024-09-07 PROCEDURE — 21400001 HC TELEMETRY ROOM

## 2024-09-07 PROCEDURE — 63600175 PHARM REV CODE 636 W HCPCS: Performed by: NURSE PRACTITIONER

## 2024-09-07 PROCEDURE — 84100 ASSAY OF PHOSPHORUS: CPT

## 2024-09-07 PROCEDURE — 99232 SBSQ HOSP IP/OBS MODERATE 35: CPT | Mod: ,,, | Performed by: NURSE PRACTITIONER

## 2024-09-07 PROCEDURE — 25000003 PHARM REV CODE 250: Performed by: INTERNAL MEDICINE

## 2024-09-07 PROCEDURE — 87040 BLOOD CULTURE FOR BACTERIA: CPT | Mod: 59

## 2024-09-07 PROCEDURE — 63600175 PHARM REV CODE 636 W HCPCS: Performed by: INTERNAL MEDICINE

## 2024-09-07 PROCEDURE — 25000003 PHARM REV CODE 250: Performed by: NURSE PRACTITIONER

## 2024-09-07 PROCEDURE — 83605 ASSAY OF LACTIC ACID: CPT

## 2024-09-07 PROCEDURE — 80048 BASIC METABOLIC PNL TOTAL CA: CPT | Performed by: INTERNAL MEDICINE

## 2024-09-07 PROCEDURE — 80202 ASSAY OF VANCOMYCIN: CPT | Performed by: INTERNAL MEDICINE

## 2024-09-07 RX ORDER — MORPHINE SULFATE 2 MG/ML
2 INJECTION, SOLUTION INTRAMUSCULAR; INTRAVENOUS EVERY 6 HOURS PRN
Status: DISCONTINUED | OUTPATIENT
Start: 2024-09-07 | End: 2024-10-02

## 2024-09-07 RX ORDER — ACETAMINOPHEN 500 MG
1000 TABLET ORAL EVERY 8 HOURS PRN
Status: DISCONTINUED | OUTPATIENT
Start: 2024-09-07 | End: 2024-09-12

## 2024-09-07 RX ORDER — MORPHINE SULFATE 4 MG/ML
4 INJECTION, SOLUTION INTRAMUSCULAR; INTRAVENOUS EVERY 6 HOURS PRN
Status: DISCONTINUED | OUTPATIENT
Start: 2024-09-07 | End: 2024-10-02

## 2024-09-07 RX ORDER — MORPHINE SULFATE 4 MG/ML
4 INJECTION, SOLUTION INTRAMUSCULAR; INTRAVENOUS ONCE
Status: COMPLETED | OUTPATIENT
Start: 2024-09-07 | End: 2024-09-07

## 2024-09-07 RX ADMIN — ATORVASTATIN CALCIUM 40 MG: 40 TABLET, FILM COATED ORAL at 09:09

## 2024-09-07 RX ADMIN — INSULIN ASPART 14 UNITS: 100 INJECTION, SOLUTION INTRAVENOUS; SUBCUTANEOUS at 04:09

## 2024-09-07 RX ADMIN — INSULIN ASPART 14 UNITS: 100 INJECTION, SOLUTION INTRAVENOUS; SUBCUTANEOUS at 09:09

## 2024-09-07 RX ADMIN — ENOXAPARIN SODIUM 40 MG: 40 INJECTION SUBCUTANEOUS at 04:09

## 2024-09-07 RX ADMIN — NORTRIPTYLINE HYDROCHLORIDE 50 MG: 25 CAPSULE ORAL at 09:09

## 2024-09-07 RX ADMIN — CEFEPIME 2 G: 2 INJECTION, POWDER, FOR SOLUTION INTRAVENOUS at 09:09

## 2024-09-07 RX ADMIN — MORPHINE SULFATE 4 MG: 4 INJECTION INTRAVENOUS at 02:09

## 2024-09-07 RX ADMIN — GABAPENTIN 1200 MG: 400 CAPSULE ORAL at 09:09

## 2024-09-07 RX ADMIN — ACETAMINOPHEN 1000 MG: 500 TABLET ORAL at 06:09

## 2024-09-07 RX ADMIN — INSULIN ASPART 3 UNITS: 100 INJECTION, SOLUTION INTRAVENOUS; SUBCUTANEOUS at 09:09

## 2024-09-07 RX ADMIN — EMTRICITABINE AND TENOFOVIR DISOPROXIL FUMARATE 1 TABLET: 200; 300 TABLET, FILM COATED ORAL at 09:09

## 2024-09-07 RX ADMIN — GABAPENTIN 600 MG: 300 CAPSULE ORAL at 09:09

## 2024-09-07 RX ADMIN — VANCOMYCIN HYDROCHLORIDE 1500 MG: 1.5 INJECTION, POWDER, LYOPHILIZED, FOR SOLUTION INTRAVENOUS at 09:09

## 2024-09-07 RX ADMIN — SODIUM CHLORIDE, POTASSIUM CHLORIDE, SODIUM LACTATE AND CALCIUM CHLORIDE 500 ML: 600; 310; 30; 20 INJECTION, SOLUTION INTRAVENOUS at 09:09

## 2024-09-07 RX ADMIN — INSULIN ASPART 3 UNITS: 100 INJECTION, SOLUTION INTRAVENOUS; SUBCUTANEOUS at 04:09

## 2024-09-07 RX ADMIN — INSULIN ASPART 14 UNITS: 100 INJECTION, SOLUTION INTRAVENOUS; SUBCUTANEOUS at 12:09

## 2024-09-07 RX ADMIN — PIPERACILLIN SODIUM AND TAZOBACTAM SODIUM 4.5 G: 4; .5 INJECTION, POWDER, FOR SOLUTION INTRAVENOUS at 09:09

## 2024-09-07 RX ADMIN — ACETAMINOPHEN 1000 MG: 500 TABLET ORAL at 09:09

## 2024-09-07 NOTE — PLAN OF CARE
Problem: Adult Inpatient Plan of Care  Goal: Plan of Care Review  Outcome: Progressing  Goal: Patient-Specific Goal (Individualized)  Outcome: Progressing  Goal: Absence of Hospital-Acquired Illness or Injury  Outcome: Progressing  Goal: Optimal Comfort and Wellbeing  Outcome: Progressing  Goal: Readiness for Transition of Care  Outcome: Progressing     Problem: Fall Injury Risk  Goal: Absence of Fall and Fall-Related Injury  Outcome: Progressing     Problem: Sepsis/Septic Shock  Goal: Optimal Coping  Outcome: Progressing  Goal: Absence of Bleeding  Outcome: Progressing  Goal: Blood Glucose Level Within Targeted Range  Outcome: Progressing  Goal: Absence of Infection Signs and Symptoms  Outcome: Progressing  Goal: Optimal Nutrition Intake  Outcome: Progressing     Problem: Acute Kidney Injury/Impairment  Goal: Fluid and Electrolyte Balance  Outcome: Progressing  Goal: Improved Oral Intake  Outcome: Progressing  Goal: Effective Renal Function  Outcome: Progressing     Problem: Wound  Goal: Optimal Coping  Outcome: Progressing  Goal: Optimal Functional Ability  Outcome: Progressing  Goal: Absence of Infection Signs and Symptoms  Outcome: Progressing  Goal: Improved Oral Intake  Outcome: Progressing  Goal: Optimal Pain Control and Function  Outcome: Progressing  Goal: Skin Health and Integrity  Outcome: Progressing  Goal: Optimal Wound Healing  Outcome: Progressing     Problem: Diabetes Comorbidity  Goal: Blood Glucose Level Within Targeted Range  Outcome: Progressing     Problem: Skin Injury Risk Increased  Goal: Skin Health and Integrity  Outcome: Progressing

## 2024-09-07 NOTE — ASSESSMENT & PLAN NOTE
"This patient does have evidence of infective focus  My overall impression is sepsis.  Source: Skin and Soft Tissue (location ankle)  Antibiotics given-   Antibiotics (72h ago, onward)      Start     Stop Route Frequency Ordered    09/07/24 0830  vancomycin 1,500 mg in D5W 250 mL IVPB (admixture device)         -- IV Once 09/07/24 0727    09/06/24 2130  ceFEPIme (MAXIPIME) 2 g in D5W 100 mL IVPB (MB+)         -- IV Every 12 hours (non-standard times) 09/06/24 1651    09/06/24 0911  vancomycin - pharmacy to dose  (vancomycin IVPB (PEDS and ADULTS))        Placed in "And" Linked Group    -- IV pharmacy to manage frequency 09/06/24 0811          Latest lactate reviewed-  Recent Labs   Lab 09/06/24  0536   POCLAC 1.23       Organ dysfunction indicated by Acute kidney injury and Encephalopathy    Fluid challenge Actual Body weight- Patient will receive 30ml/kg actual body weight to calculate fluid bolus for treatment of septic shock.     Post- resuscitation assessment No - Post resuscitation assessment not needed     Will Not start Pressors-   Source control achieved by: see above  "

## 2024-09-07 NOTE — NURSING
Nurses Note -- 4 Eyes      9/7/2024   3:54 AM      Skin assessed during: Daily Assessment      [x] No Altered Skin Integrity Present    []Prevention Measures Documented      [] Yes- Altered Skin Integrity Present or Discovered   [] LDA Added if Not in Epic (Describe Wound)   [] New Altered Skin Integrity was Present on Admit and Documented in LDA   [] Wound Image Taken    Wound Care Consulted? No    Attending Nurse:  Luciana Hoffmann RN/Staff Member:   NIEVES Abbasi

## 2024-09-07 NOTE — ASSESSMENT & PLAN NOTE
- 2/2 blood cx with gram positive cocci. Rapid ID with MSSA  - Follow repeat blood cx  - ID following: continue IV vanc and cefepime

## 2024-09-07 NOTE — SUBJECTIVE & OBJECTIVE
"Principal Problem:Surgical wound breakdown    Principal Orthopedic Problem: s/p I&D and wound vac placement on 09/06    Interval History: Pt seen and examined at bedside. tachycardic.  NAEON. Reports no new symptoms. Denies fevers or chill.      Review of patient's allergies indicates:   Allergen Reactions    Invokana [canagliflozin] Anaphylaxis    Percocet [oxycodone-acetaminophen] Nausea Only and Hallucinations    Biaxin [clarithromycin]     Hydrocodone Other (See Comments)     Dizzy/nausea/hallucinations    Sulfa (sulfonamide antibiotics) Nausea Only and Rash       Current Facility-Administered Medications   Medication    0.9%  NaCl infusion    atorvastatin tablet 40 mg    ceFEPIme (MAXIPIME) 2 g in D5W 100 mL IVPB (MB+)    dextrose 10% bolus 125 mL 125 mL    dextrose 10% bolus 125 mL 125 mL    dextrose 10% bolus 250 mL 250 mL    dextrose 10% bolus 250 mL 250 mL    emtricitabine-tenofovir 200-300 mg per tablet 1 tablet    enoxaparin injection 40 mg    gabapentin capsule 600 mg    And    gabapentin capsule 1,200 mg    glucagon (human recombinant) injection 1 mg    glucose chewable tablet 16 g    glucose chewable tablet 24 g    insulin aspart U-100 pen 0-15 Units    insulin aspart U-100 pen 14 Units    insulin glargine U-100 (Lantus) pen 40 Units    nortriptyline capsule 50 mg    PHENYLephrine injection 100 mcg    PHENYLephrine injection 100 mcg    vancomycin - pharmacy to dose     Objective:     Vital Signs (Most Recent):  Temp: 97.4 °F (36.3 °C) (09/07/24 0440)  Pulse: (!) 113 (09/07/24 0440)  Resp: 18 (09/07/24 0440)  BP: (!) 145/65 (09/07/24 0440)  SpO2: (!) 94 % (09/07/24 0440) Vital Signs (24h Range):  Temp:  [97.4 °F (36.3 °C)-99.9 °F (37.7 °C)] 97.4 °F (36.3 °C)  Pulse:  [] 113  Resp:  [12-33] 18  SpO2:  [93 %-100 %] 94 %  BP: ()/(44-75) 145/65     Weight: 93 kg (205 lb 0.4 oz)  Height: 5' 5" (165.1 cm)  Body mass index is 34.12 kg/m².      Intake/Output Summary (Last 24 hours) at 9/7/2024 " 0657  Last data filed at 9/6/2024 2220  Gross per 24 hour   Intake 1550 ml   Output 1000 ml   Net 550 ml        Ortho/SPM Exam     AAOx4  NAD  Reg rate  No increased WOB    RLE    Dressing C/D/I   Wound vac to good suction  Compartments soft/compressible  Reduced sensation to the dorsum of toe  No to dorsiflexion or plantarflexion  WWP. Brisk cap refill      Significant Labs: All pertinent labs within the past 24 hours have been reviewed.    Significant Imaging: I have reviewed and interpreted all pertinent imaging results/findings.

## 2024-09-07 NOTE — CARE UPDATE
RAPID RESPONSE NURSE PROACTIVE ROUNDING NOTE       Time of Visit: 1840    Admit Date: 2024  LOS: 1  Code Status: Full Code   Date of Visit: 2024  : 1960  Age: 64 y.o.  Sex: male  Race: White  [facility]:   MRN: [id number]  Was the patient discharged from an ICU this admission? No   Was the patient discharged from a PACU within last 24 hours? Yes   Did the patient receive conscious sedation/general anesthesia in last 24 hours? No  Was the patient in the ED within the past 24 hours? No  Was the patient on NIPPV within the past 24 hours? No   Attending Physician: Filemon Miner MD  Primary Service: [facility]   Time spent at the bedside: < 15 min    SITUATION    Notified by bedside RN via phone call.  Reason for alert: fever/tachycardia  Called to evaluate the patient for Dysrhythmia.    BACKGROUND     Why is the patient in the hospital?: Surgical wound breakdown    Patient has a past medical history of Anxiety, Back pain, Cataract, Chronic pain syndrome, Diabetes type 2, controlled, Diabetic retinopathy, DM (diabetes mellitus), DM (diabetes mellitus), type 2, uncontrolled, Gastroesophageal reflux disease without esophagitis, Hyperlipidemia, Insomnia, and Neuropathy.    Last Vitals:  Temp: 100.5 °F (38.1 °C) (1825)  Pulse: 120 (1825)  Resp: 20 (1825)  BP: 146/77 (1825)  SpO2: 91 % (1825)    24 Hours Vitals Range:  Temp:  [97.4 °F (36.3 °C)-101.1 °F (38.4 °C)]   Pulse:  []   Resp:  [12-22]   BP: (102-162)/(56-77)   SpO2:  [90 %-99 %]     Labs:  Recent Labs     24  0235 24  0242 24  0519   WBC 18.68*  --  17.10*   HGB 11.2*  --  10.1*   HCT 32.8* 36 30.6*     --  359       Recent Labs     24  0235 24  0420 24  0519 24  0910   *  --  132*  --    K 4.4  --  4.0  --    CL 95  --  104  --    CO2 19*  --  18*  --    BUN 38*  --  23  --    CREATININE 1.7*  --  1.2  --    *  --  193*  --    PHOS  --  1.9*   --  1.8*   MG  --  2.3  --   --         Recent Labs     09/06/24  0242   PH 7.332*   PCO2 38.8   PO2 49   HCO3 20.5*   POCSATURATED 82   BE -5*        ASSESSMENT     Patient is tachycardic in the 120s, with low grade fever. Mildly hypertensive. See flow sheet for vitals. Dressing to right lower extremity is clean, dry, and intact. He is able to to wiggle his toes and has full sensation.      Physical Exam  Vitals reviewed.   Constitutional:       Appearance: He is ill-appearing.   Cardiovascular:      Rate and Rhythm: Regular rhythm. Tachycardia present.      Pulses: Normal pulses.   Skin:     General: Skin is warm and dry.   Neurological:      Mental Status: He is oriented to person, place, and time.         INTERVENTIONS    The patient was seen for Cardiac problem. Staff concerns included tachycardia. The following interventions were performed: continuous cardiac monitoring continued and No additional interventions needed at this time..    RECOMMENDATIONS    - administer antipyretics as ordered for fever  - Continuous telemetry monitoring  - Antibiotics per ID recommendations    PROVIDER ESCALATION    Yes/No  Yes    Orders received and case discussed with  bedside RN in contact with primary physician .    Disposition: Remain in room 542.    FOLLOW-UP    Bedside RNNhi  updated on plan of care. Instructed to call the Rapid Response Nurse, Kristi Mims RN at [phone] for additional questions or concerns.

## 2024-09-07 NOTE — ASSESSMENT & PLAN NOTE
Resolved s/p fluids  DEEPALI is likely due to pre-renal azotemia due to dehydration. Baseline creatinine is  1 . Most recent creatinine and eGFR are listed below.  Recent Labs     09/06/24  0235 09/07/24  0519   CREATININE 1.7* 1.2   EGFRNORACEVR 44.7* >60.0        Plan  - DEEPALI is resolved  - Avoid nephrotoxins and renally dose meds for GFR listed above  - Monitor urine output, serial BMP, and adjust therapy as needed

## 2024-09-07 NOTE — SUBJECTIVE & OBJECTIVE
"Interval HPI:   Overnight events: No acute events overnight. Patient in room 542/542 A. Blood glucose stable. BG at goal on current insulin regimen (SSI, prandial, and basal insulin ). Steroid use- None. 1 Day Post-Op  Renal function- Normal   Vasopressors-  None       Endocrine will continue to follow and manage insulin orders inpatient.         Diet diabetic  Calorie     Eatin%  Nausea: No  Hypoglycemia and intervention: No  Fever: No  TPN and/or TF: No      BP (!) 162/73 (BP Location: Left arm, Patient Position: Lying)   Pulse (!) 116   Temp (!) 101.1 °F (38.4 °C) (Oral)   Resp 20   Ht 5' 5" (1.651 m)   Wt 93 kg (205 lb 0.4 oz)   SpO2 (!) 94%   BMI 34.12 kg/m²     Labs Reviewed and Include    Recent Labs   Lab 24  0519   *   CALCIUM 8.5*   *   K 4.0   CO2 18*      BUN 23   CREATININE 1.2     Lab Results   Component Value Date    WBC 17.10 (H) 2024    HGB 10.1 (L) 2024    HCT 30.6 (L) 2024    MCV 85 2024     2024     No results for input(s): "TSH", "FREET4" in the last 168 hours.  Lab Results   Component Value Date    HGBA1C 8.5 (H) 2024       Nutritional status:   Body mass index is 34.12 kg/m².  Lab Results   Component Value Date    ALBUMIN 2.4 (L) 2024    ALBUMIN 2.4 (L) 2024    ALBUMIN 2.7 (L) 2024     Lab Results   Component Value Date    PREALBUMIN 3 (L) 2024    PREALBUMIN 13 (L) 2024       Estimated Creatinine Clearance: 66 mL/min (based on SCr of 1.2 mg/dL).    Accu-Checks  Recent Labs     24  0922 24  1220 24  1702 24  2141 24  0859   POCTGLUCOSE 216* 201* 256* 283* 186*       Current Medications and/or Treatments Impacting Glycemic Control  Immunotherapy:    Immunosuppressants       None          Steroids:   Hormones (From admission, onward)      None          Pressors:    Autonomic Drugs (From admission, onward)      None          Hyperglycemia/Diabetes " Medications:   Antihyperglycemics (From admission, onward)      Start     Stop Route Frequency Ordered    09/07/24 0715  insulin aspart U-100 pen 14 Units         -- SubQ 3 times daily with meals 09/06/24 1858 09/06/24 2100  insulin glargine U-100 (Lantus) pen 40 Units         -- SubQ Nightly 09/06/24 1858 09/06/24 1957  insulin aspart U-100 pen 0-15 Units         -- SubQ Before meals, nightly and at 0200 PRN 09/06/24 1858

## 2024-09-07 NOTE — PROGRESS NOTES
[facility]  Orthopedics  Progress Note    Patient Name: Cortes Schroeder  MRN: [id number]  Admission Date: 9/6/2024  Hospital Length of Stay: 1 days  Attending Provider: Filemon Miner MD  Primary Care Provider: Andrew Sinclair Jr., MD  Follow-up For: Procedure(s) (LRB):  APPLICATION, WOUND VAC (Right)  IRRIGATION AND DEBRIDEMENT (Right)    Post-Operative Day: 1 Day Post-Op  Subjective:     Principal Problem:Surgical wound breakdown    Principal Orthopedic Problem: s/p I&D and wound vac placement on 09/06    Interval History: Pt seen and examined at bedside. tachycardic.  NAEON. Reports no new symptoms. Denies fevers or chill.      Review of patient's allergies indicates:   Allergen Reactions    Invokana [canagliflozin] Anaphylaxis    Percocet [oxycodone-acetaminophen] Nausea Only and Hallucinations    Biaxin [clarithromycin]     Hydrocodone Other (See Comments)     Dizzy/nausea/hallucinations    Sulfa (sulfonamide antibiotics) Nausea Only and Rash       Current Facility-Administered Medications   Medication    0.9%  NaCl infusion    atorvastatin tablet 40 mg    ceFEPIme (MAXIPIME) 2 g in D5W 100 mL IVPB (MB+)    dextrose 10% bolus 125 mL 125 mL    dextrose 10% bolus 125 mL 125 mL    dextrose 10% bolus 250 mL 250 mL    dextrose 10% bolus 250 mL 250 mL    emtricitabine-tenofovir 200-300 mg per tablet 1 tablet    enoxaparin injection 40 mg    gabapentin capsule 600 mg    And    gabapentin capsule 1,200 mg    glucagon (human recombinant) injection 1 mg    glucose chewable tablet 16 g    glucose chewable tablet 24 g    insulin aspart U-100 pen 0-15 Units    insulin aspart U-100 pen 14 Units    insulin glargine U-100 (Lantus) pen 40 Units    nortriptyline capsule 50 mg    PHENYLephrine injection 100 mcg    PHENYLephrine injection 100 mcg    vancomycin - pharmacy to dose     Objective:     Vital Signs (Most Recent):  Temp: 97.4 °F (36.3 °C) (09/07/24 0440)  Pulse: (!) 113 (09/07/24 0440)  Resp: 18 (09/07/24  "0440)  BP: (!) 145/65 (09/07/24 0440)  SpO2: (!) 94 % (09/07/24 0440) Vital Signs (24h Range):  Temp:  [97.4 °F (36.3 °C)-99.9 °F (37.7 °C)] 97.4 °F (36.3 °C)  Pulse:  [] 113  Resp:  [12-33] 18  SpO2:  [93 %-100 %] 94 %  BP: ()/(44-75) 145/65     Weight: 93 kg (205 lb 0.4 oz)  Height: 5' 5" (165.1 cm)  Body mass index is 34.12 kg/m².      Intake/Output Summary (Last 24 hours) at 9/7/2024 0657  Last data filed at 9/6/2024 2220  Gross per 24 hour   Intake 1550 ml   Output 1000 ml   Net 550 ml        Ortho/SPM Exam     AAOx4  NAD  Reg rate  No increased WOB    RLE    Dressing C/D/I   Wound vac to good suction  Compartments soft/compressible  Reduced sensation to the dorsum of toe due to peripheral neuropathy  Reduced ROM to the toes. Pt had a block preop  WWP. Brisk cap refill      Significant Labs: All pertinent labs within the past 24 hours have been reviewed.    Significant Imaging: I have reviewed and interpreted all pertinent imaging results/findings.  Assessment/Plan:     * Surgical wound breakdown  Cortes Schroeder is a 64 y.o. male with PMH of DM, HTN  and right trimalleolar ankle fracture status post ex fix on 07/18 with subsequent definitive fixation on 07/26 presenting with right ankle wound and concern for sepsis.  Patient brought to ED for SOB by a roommate as you noticed mental status change yesterday afternoon.  Upon presentation, patient afebrile, tachycardic and hypotensive.  Sepsis protocol initiated.  Patient received clindamycin and vancomycin in the ED. WBC 18.68, .3.  On exam patient has draining wound over the medial side of the right ankle as well as eschar formation over the lateral side of the ankle.  Right ankle and foot erythematous and edematous consistent with postoperative infection. He is s/p I&D of R ankle 09/06    - Patient admitted to hospital medicine  - ABX  cefepime  - Multimodal pain management  - F/u culture          Dnoovan Isabel MD  Orthopedics  [facility] " [facility]

## 2024-09-07 NOTE — ASSESSMENT & PLAN NOTE
Titrate insulin slowly to avoid hypoglycemia as the risk of hypoglycemia increases with decreased creatinine clearance.  Estimated Creatinine Clearance: 66 mL/min (based on SCr of 1.2 mg/dL).

## 2024-09-07 NOTE — PROGRESS NOTES
Nursing Transfer Note      Reason patient is being transferred: Post procedure    Transfer To: 5 th floor POSS Room # 542    Transfer via bed    Transfer with Infusion Pump & wound vac    Transported by RN    Medicines sent: Insulin pen    Any special needs or follow-up needed: Routine    Chart send with patient: Yes    Notified: friend    Patient reassessed at: 2000 9/6/2024

## 2024-09-07 NOTE — SUBJECTIVE & OBJECTIVE
Interval History: Febrile this AM to 101.1, but now afebrile. Consistently tachycardic with HR in the 110s. 2/2 blood cultures with gram positive cocci. Repeats pending. Continue IV vanc and cefepime. Giving additional IVFs. Seen today with roommate at bedside. Mentation improving. Reports 8/10 pain to R ankle. Reports some increased SOB and a nonproductive cough.     Review of Systems   Constitutional:  Positive for activity change and chills. Negative for fever.   Respiratory:  Positive for cough and shortness of breath. Negative for chest tightness.    Cardiovascular:  Negative for chest pain and leg swelling.   Gastrointestinal:  Negative for abdominal pain and nausea.   Musculoskeletal:  Positive for arthralgias.   Skin:  Positive for color change and wound.   Neurological:  Negative for dizziness and weakness.     Objective:     Vital Signs (Most Recent):  Temp: 97.4 °F (36.3 °C) (09/07/24 0440)  Pulse: (!) 111 (09/07/24 0659)  Resp: 18 (09/07/24 0440)  BP: (!) 145/65 (09/07/24 0440)  SpO2: (!) 94 % (09/07/24 0440) Vital Signs (24h Range):  Temp:  [97.4 °F (36.3 °C)-99.9 °F (37.7 °C)] 97.4 °F (36.3 °C)  Pulse:  [] 111  Resp:  [12-33] 18  SpO2:  [93 %-100 %] 94 %  BP: ()/(44-75) 145/65     Weight: 93 kg (205 lb 0.4 oz)  Body mass index is 34.12 kg/m².    Intake/Output Summary (Last 24 hours) at 9/7/2024 0757  Last data filed at 9/6/2024 2220  Gross per 24 hour   Intake 1550 ml   Output 1000 ml   Net 550 ml         Physical Exam  Vitals and nursing note reviewed.   Constitutional:       Appearance: He is well-developed. He is obese. He is ill-appearing.   Eyes:      Pupils: Pupils are equal, round, and reactive to light.   Cardiovascular:      Rate and Rhythm: Normal rate and regular rhythm.   Pulmonary:      Effort: Pulmonary effort is normal.      Breath sounds: Rales (L base) present.      Comments: On 1L NC  Abdominal:      Palpations: Abdomen is soft.      Tenderness: There is no abdominal  tenderness.   Musculoskeletal:         General: No tenderness.   Skin:     General: Skin is warm and dry.      Comments: C/d/I dressing to R ankle with wound vac    Neurological:      Mental Status: He is alert and oriented to person, place, and time.   Psychiatric:         Behavior: Behavior normal.             Significant Labs: All pertinent labs within the past 24 hours have been reviewed.  BMP:   Recent Labs   Lab 09/06/24  0420 09/07/24  0519   GLU  --  193*   NA  --  132*   K  --  4.0   CL  --  104   CO2  --  18*   BUN  --  23   CREATININE  --  1.2   CALCIUM  --  8.5*   MG 2.3  --      CBC:   Recent Labs   Lab 09/06/24  0235 09/06/24  0242 09/07/24  0519   WBC 18.68*  --  17.10*   HGB 11.2*  --  10.1*   HCT 32.8* 36 30.6*     --  359       Significant Imaging: I have reviewed all pertinent imaging results/findings within the past 24 hours.

## 2024-09-07 NOTE — ASSESSMENT & PLAN NOTE
Endocrinology consulted for BG management.   BG goal 140-180    At this time, recommend that the patient remain off of his pump since he cannot be controlled in the Auto mode. Patient does not interact with pump frequently and relies on the auto mode algorithm.     WBD 0.8 units/kg/day  - Lantus (Insulin Glargine) 45 units nightly   - Novolog (Insulin Aspart) 14 units TIDWM and prn for BG excursions [facility] SSI (150/25)  - BG checks AC/HS/0200  - Hypoglycemia protocol in place    ** Please notify Endocrine for any change and/or advance in diet**  ** Please call Endocrine for any BG related issues **    Discharge Planning:   TBD. Please notify endocrinology prior to discharge.

## 2024-09-07 NOTE — PROGRESS NOTES
[facility]  Endocrinology  Progress Note    Admit Date: 2024     Reason for Consult: Management of T2DM, Hyperglycemia      Surgical Procedure and Date: S/P irrigation debridement of RLE on 2024    Diabetes diagnosis year:      Home Diabetes Medications:    Humalog via OmniPod insulin pump  Mounjaro 10 mg weekly     Current pump settings:  Basal 12 am to 2.3 and 6 am to 1.55  ICR to 3 at 12 am, 2 at 4 pm  ISF to 1:20   AIT 3 hrs  Target 110-120        Patient had anaphylaxis reaction to SGLT2 inhibitors specifically Invokana     How often checking glucose at home? Dexcom G6   BG readings on regimen: Average 210's per Dexcom  Hypoglycemia on the regimen?  Yes  Missed doses on regimen?  No     Diabetes Complications include:     Hyperglycemia and Diabetic peripheral neuropathy         Complicating diabetes co morbidities:   HLD, HTN, Obesity         HPI: 64 y.o. male presents to the ED w/ complaint of altered mental status. Hx of R ankle fracture with surgery over a month ago. Patient now presents with sepsis 2/2 R ankle infection s/p ORIF on . Started on IV vanc and cefepime. Given IVFs. Blood cultures pending. Brought to OR with ortho on  for irrigation debridement of RLE. Endocrine following for BG and type 2 diabetes.     Lab Results   Component Value Date    LABA1C 10.8 (H) 08/15/2016    HGBA1C 8.5 (H) 2024         Interval HPI:   Overnight events: No acute events overnight. Patient in room 542/542 A. Blood glucose stable. BG at goal on current insulin regimen (SSI, prandial, and basal insulin ). Steroid use- None. 1 Day Post-Op  Renal function- Normal   Vasopressors-  None       Endocrine will continue to follow and manage insulin orders inpatient.         Diet diabetic  Calorie     Eatin%  Nausea: No  Hypoglycemia and intervention: No  Fever: No  TPN and/or TF: No      BP (!) 162/73 (BP Location: Left arm, Patient Position: Lying)   Pulse (!) 116   Temp (!)  "101.1 °F (38.4 °C) (Oral)   Resp 20   Ht 5' 5" (1.651 m)   Wt 93 kg (205 lb 0.4 oz)   SpO2 (!) 94%   BMI 34.12 kg/m²     Labs Reviewed and Include    Recent Labs   Lab 09/07/24  0519   *   CALCIUM 8.5*   *   K 4.0   CO2 18*      BUN 23   CREATININE 1.2     Lab Results   Component Value Date    WBC 17.10 (H) 09/07/2024    HGB 10.1 (L) 09/07/2024    HCT 30.6 (L) 09/07/2024    MCV 85 09/07/2024     09/07/2024     No results for input(s): "TSH", "FREET4" in the last 168 hours.  Lab Results   Component Value Date    HGBA1C 8.5 (H) 09/06/2024       Nutritional status:   Body mass index is 34.12 kg/m².  Lab Results   Component Value Date    ALBUMIN 2.4 (L) 09/06/2024    ALBUMIN 2.4 (L) 07/23/2024    ALBUMIN 2.7 (L) 07/21/2024     Lab Results   Component Value Date    PREALBUMIN 3 (L) 09/06/2024    PREALBUMIN 13 (L) 07/18/2024       Estimated Creatinine Clearance: 66 mL/min (based on SCr of 1.2 mg/dL).    Accu-Checks  Recent Labs     09/06/24  0922 09/06/24  1220 09/06/24  1702 09/06/24  2141 09/07/24  0859   POCTGLUCOSE 216* 201* 256* 283* 186*       Current Medications and/or Treatments Impacting Glycemic Control  Immunotherapy:    Immunosuppressants       None          Steroids:   Hormones (From admission, onward)      None          Pressors:    Autonomic Drugs (From admission, onward)      None          Hyperglycemia/Diabetes Medications:   Antihyperglycemics (From admission, onward)      Start     Stop Route Frequency Ordered    09/07/24 0715  insulin aspart U-100 pen 14 Units         -- SubQ 3 times daily with meals 09/06/24 1858 09/06/24 2100  insulin glargine U-100 (Lantus) pen 40 Units         -- SubQ Nightly 09/06/24 1858 09/06/24 1957  insulin aspart U-100 pen 0-15 Units         -- SubQ Before meals, nightly and at 0200 PRN 09/06/24 2552            ASSESSMENT and PLAN    Renal/  DEEPALI (acute kidney injury)  Titrate insulin slowly to avoid hypoglycemia as the risk of " hypoglycemia increases with decreased creatinine clearance.  Estimated Creatinine Clearance: 66 mL/min (based on SCr of 1.2 mg/dL).        Endocrine  Uncontrolled diabetes mellitus with hyperglycemia  Endocrinology consulted for BG management.   BG goal 140-180    At this time, recommend that the patient remain off of his pump since he cannot be controlled in the Auto mode. Patient does not interact with pump frequently and relies on the auto mode algorithm.     WBD 0.8 units/kg/day  - Lantus (Insulin Glargine) 45 units nightly   - Novolog (Insulin Aspart) 14 units TIDWM and prn for BG excursions [facility] SSI (150/25)  - BG checks AC/HS/0200  - Hypoglycemia protocol in place    ** Please notify Endocrine for any change and/or advance in diet**  ** Please call Endocrine for any BG related issues **    Discharge Planning:   TBD. Please notify endocrinology prior to discharge.        Orthopedic  * Surgical wound breakdown  Optimize BG control to improve wound healing             Brandyn Malin, DNP, FNP  Endocrinology  [facility] - Surgery

## 2024-09-07 NOTE — PROGRESS NOTES
Pharmacokinetic Assessment Follow Up: IV Vancomycin    Vancomycin serum concentration assessment/plan:    The random level resulted subtherapeutic at 12.4 ug/mL (~24 hours post-dose)  Goal trough is 15-20 ug/mL   Scr 1.2; DEEPALI improving   Vancomycin 1500 mg IV x 1 now; Re-dose when the random level is less than 20 mcg/mL  Next level to be drawn at 0830 on 9/8/2024    Drug levels (last 3 results):  Recent Labs   Lab Result Units 09/07/24  0519   Vancomycin, Random ug/mL 12.4       Pharmacy will continue to follow and monitor vancomycin.    Please contact pharmacy at extension [phone] for questions regarding this assessment.    Thank you for the consult,   Devon Barber       Patient brief summary:  Cortes Schroeder is a 64 y.o. male initiated on antimicrobial therapy with IV Vancomycin for treatment of bacteremia      Drug Allergies:   Review of patient's allergies indicates:   Allergen Reactions    Invokana [canagliflozin] Anaphylaxis    Percocet [oxycodone-acetaminophen] Nausea Only and Hallucinations    Biaxin [clarithromycin]     Hydrocodone Other (See Comments)     Dizzy/nausea/hallucinations    Sulfa (sulfonamide antibiotics) Nausea Only and Rash       Actual Body Weight:   93 kg    Renal Function:   Estimated Creatinine Clearance: 66 mL/min (based on SCr of 1.2 mg/dL).,     Dialysis Method (if applicable):  N/A    CBC (last 72 hours):  Recent Labs   Lab Result Units 09/06/24 0235 09/06/24 0420 09/07/24 0519   WBC K/uL 18.68*  --  17.10*   Hemoglobin g/dL 11.2*  --  10.1*   Hemoglobin A1C %  --  8.5*  --    Hematocrit % 32.8*  --  30.6*   Platelets K/uL 327  --  359   Gran % % 88.0*  --   --    Lymph % % 3.0*  --   --    Mono % % 5.0  --   --    Eosinophil % % 0.0  --   --    Basophil % % 1.0  --   --    Differential Method  Manual  --   --        Metabolic Panel (last 72 hours):  Recent Labs   Lab Result Units 09/06/24  0235 09/06/24  0254 09/06/24 0420 09/07/24  0519   Sodium mmol/L 127*  --   --  132*    Sodium, Urine mmol/L  --  20  --   --    Potassium mmol/L 4.4  --   --  4.0   Chloride mmol/L 95  --   --  104   CO2 mmol/L 19*  --   --  18*   Glucose mg/dL 311*  --   --  193*   Glucose, UA   --  4+*  --   --    BUN mg/dL 38*  --   --  23   Creatinine mg/dL 1.7*  --   --  1.2   Albumin g/dL 2.4*  --   --   --    Total Bilirubin mg/dL 0.6  --   --   --    Alkaline Phosphatase U/L 242*  --   --   --    AST U/L 42*  --   --   --    ALT U/L 32  --   --   --    Magnesium mg/dL  --   --  2.3  --    Phosphorus mg/dL  --   --  1.9*  --        Vancomycin Administrations:  vancomycin given in the last 96 hours                     vancomycin injection (g) 1 g Given 09/06/24 1514    vancomycin (VANCOCIN) 2,250 mg in D5W 500 mL IVPB (mg) 2,250 mg New Bag 09/06/24 0340                    Microbiologic Results:  Microbiology Results (last 7 days)       Procedure Component Value Units Date/Time    Aerobic culture [[id number]] Collected: 09/06/24 1446    Order Status: Completed Specimen: Incision site from Ankle, Right Updated: 09/07/24 0652     Aerobic Bacterial Culture Insufficient incubation, culture in progress    Narrative:      Right ankle wound - culture    MRSA/SA Rapid ID by PCR from Blood culture [[id number]]  (Abnormal) Collected: 09/06/24 0235    Order Status: Completed Updated: 09/06/24 2224     Staph aureus ID by PCR Positive     Methicillin Resistant ID by PCR Negative    Narrative:      Aerobic and anaerobic    Blood culture x two cultures. Draw prior to antibiotics. [[id number]] Collected: 09/06/24 0235    Order Status: Completed Specimen: Blood from Peripheral, Antecubital, Left Updated: 09/06/24 2055     Blood Culture, Routine Gram stain aer bottle: Gram positive cocci in clusters resembling Staph      Gram stain alicia bottle: Gram positive cocci in clusters resembling Staph      Results called to and read back by:Luciana Altamirano RN 09/06/2024      20:55    Narrative:      Aerobic and anaerobic    Blood  culture x two cultures. Draw prior to antibiotics. [[id number]] Collected: 09/06/24 0235    Order Status: Completed Specimen: Blood from Peripheral, Antecubital, Left Updated: 09/06/24 2053     Blood Culture, Routine Gram stain aer bottle: Gram positive cocci in clusters resembling Staph      Gram stain alicia bottle: Gram positive cocci in clusters resembling Staph      Results called to and read back by:Luciana Altamirano RN 09/06/2024      20:52    Narrative:      Aerobic and anaerobic    Gram stain [[id number]] Collected: 09/06/24 1446    Order Status: Completed Specimen: Incision site from Ankle, Right Updated: 09/06/24 1805     Gram Stain Result Rare WBC's      Few Gram positive cocci    Narrative:      Right ankle wound - culture    Fungus culture [[id number]] Collected: 09/06/24 1446    Order Status: Sent Specimen: Incision site from Ankle, Right Updated: 09/06/24 1501    AFB Culture & Smear [[id number]] Collected: 09/06/24 1446    Order Status: Sent Specimen: Incision site from Ankle, Right Updated: 09/06/24 1500    Culture, Anaerobe [[id number]] Collected: 09/06/24 1446    Order Status: Sent Specimen: Incision site from Ankle, Right Updated: 09/06/24 1500

## 2024-09-07 NOTE — ASSESSMENT & PLAN NOTE
Patient's FSGs are not controlled on current hypoglycemics.   Last A1c reviewed-   Lab Results   Component Value Date    LABA1C 10.8 (H) 08/15/2016    HGBA1C 8.5 (H) 09/06/2024     Most recent fingerstick glucose reviewed-   Recent Labs   Lab 09/06/24  1702 09/06/24  2141 09/07/24  0859 09/07/24  1119   POCTGLUCOSE 256* 283* 186* 185*     Current correctional scale  Low  Maintain anti-hyperglycemic dose as follows-   Antihyperglycemics (From admission, onward)      Start     Stop Route Frequency Ordered    09/07/24 0715  insulin aspart U-100 pen 14 Units         -- SubQ 3 times daily with meals 09/06/24 1858 09/06/24 2100  insulin glargine U-100 (Lantus) pen 40 Units         -- SubQ Nightly 09/06/24 1858 09/06/24 1957  insulin aspart U-100 pen 0-15 Units         -- SubQ Before meals, nightly and at 0200 PRN 09/06/24 1858           - Endocrine consulted:  - At this time, recommend that the patient remain off of his pump since he cannot be controlled in the Auto mode. Patient does not interact with pump frequently and relies on the auto mode algorithm.   - Lantus (Insulin Glargine) 40 units nightly   - Novolog (Insulin Aspart) 14 units TIDWM and prn for BG excursions [facility] SSI (150/25)  - hold oral antihyperglycemics during hospitalization   - needs better BG control for optimal wound healing

## 2024-09-07 NOTE — SUBJECTIVE & OBJECTIVE
Interval HPI:   Overnight events: No acute events overnight. Patient in room [facility] 2ND FLR Periop Pool*. Blood glucose stable. BG at and above goal on current insulin regimen (Home Insulin Pump). Patient was on his Omnipod throughout the surgery, but only received basal insulin via the pump.  Steroid use- None. Day of Surgery  Renal function- Abnormal - Creatinine 1.7   Vasopressors-  None       Endocrine will continue to follow and manage insulin orders inpatient.         Diet NPO     Eating:   NPO  Nausea: No  Hypoglycemia and intervention: No  Fever: No  TPN and/or TF: No    PMH, PSH, FH, SH updated and reviewed     ROS:  Review of Systems   Constitutional:  Negative for unexpected weight change.   Eyes:  Negative for visual disturbance.   Respiratory:  Negative for cough.    Cardiovascular:  Negative for chest pain.   Gastrointestinal:  Negative for nausea and vomiting.   Endocrine: Negative for polydipsia and polyuria.   Musculoskeletal:  Negative for back pain.   Skin:  Negative for rash.   Neurological:  Negative for syncope.   Psychiatric/Behavioral:  Negative for agitation and dysphoric mood.        Current Medications and/or Treatments Impacting Glycemic Control  Immunotherapy:    Immunosuppressants       None          Steroids:   Hormones (From admission, onward)      None          Pressors:    Autonomic Drugs (From admission, onward)      None          Hyperglycemia/Diabetes Medications:   Antihyperglycemics (From admission, onward)      Start     Stop Route Frequency Ordered    09/07/24 0715  insulin aspart U-100 pen 14 Units         -- SubQ 3 times daily with meals 09/06/24 1858 09/06/24 2100  insulin glargine U-100 (Lantus) pen 40 Units         -- SubQ Nightly 09/06/24 1858 09/06/24 1957  insulin aspart U-100 pen 0-15 Units         -- SubQ Before meals, nightly and at 0200 PRN 09/06/24 1858             PHYSICAL EXAMINATION:  Vitals:    09/06/24 1815   BP: (!) 108/57   Pulse: 69   Resp: 14   Temp:       Body mass index is 34.11 kg/m².     Physical Exam  Constitutional:       Appearance: He is well-developed.   HENT:      Head: Normocephalic.   Eyes:      Conjunctiva/sclera: Conjunctivae normal.   Pulmonary:      Effort: Pulmonary effort is normal.   Musculoskeletal:         General: Normal range of motion.   Skin:     Comments: Wound Vac   Neurological:      Mental Status: He is alert and oriented to person, place, and time.

## 2024-09-07 NOTE — ASSESSMENT & PLAN NOTE
Cortes Schroeder is a 64 y.o. male with PMH of DM, HTN  and right trimalleolar ankle fracture status post ex fix on 07/18 with subsequent definitive fixation on 07/26 presenting with right ankle wound and concern for sepsis.  Patient brought to ED for SOB by a roommate as you noticed mental status change yesterday afternoon.  Upon presentation, patient afebrile, tachycardic and hypotensive.  Sepsis protocol initiated.  Patient received clindamycin and vancomycin in the ED. WBC 18.68, .3.  On exam patient has draining wound over the medial side of the right ankle as well as eschar formation over the lateral side of the ankle.  Right ankle and foot erythematous and edematous consistent with postoperative infection. He is s/p I&D of R ankle 09/06    - Patient admitted to hospital medicine  - ABX  cefepime  - Multimodal pain management  - F/u culture

## 2024-09-07 NOTE — ASSESSMENT & PLAN NOTE
Hyponatremia is likely due to Dehydration/hypovolemia. The patient's most recent sodium results are listed below.  Recent Labs     09/06/24  0235 09/07/24  0519   * 132*       Plan  - Correct the sodium by 4-6mEq in 24 hours.   - Obtain the following studies: Urine sodium, urine osmolality, serum osmolality.  - Will treat the hyponatremia with IV fluids as follows: NS  - Monitor sodium Daily.   - Patient hyponatremia is improving

## 2024-09-07 NOTE — CONSULTS
[facility] ([facility])  Endocrinology  Diabetes Consult Note    Consult Requested by: Filemon Miner MD   Reason for admit: Surgical wound breakdown    HISTORY OF PRESENT ILLNESS:  Reason for Consult: Management of T2DM, Hyperglycemia      Surgical Procedure and Date: S/P irrigation debridement of RLE on 09/06/2024    Diabetes diagnosis year: 1995     Home Diabetes Medications:    Humalog via OmniPod insulin pump  Mounjaro 10 mg weekly     Current pump settings:  Basal 12 am to 2.3 and 6 am to 1.55  ICR to 3 at 12 am, 2 at 4 pm  ISF to 1:20   AIT 3 hrs  Target 110-120        Patient had anaphylaxis reaction to SGLT2 inhibitors specifically Invokana     How often checking glucose at home? Dexcom G6   BG readings on regimen: Average 210's per Dexcom  Hypoglycemia on the regimen?  Yes  Missed doses on regimen?  No     Diabetes Complications include:     Hyperglycemia and Diabetic peripheral neuropathy         Complicating diabetes co morbidities:   HLD, HTN, Obesity         HPI: 64 y.o. male presents to the ED w/ complaint of altered mental status. Hx of R ankle fracture with surgery over a month ago. Patient now presents with sepsis 2/2 R ankle infection s/p ORIF on 07/26. Started on IV vanc and cefepime. Given IVFs. Blood cultures pending. Brought to OR with ortho on 09/06 for irrigation debridement of RLE. Endocrine following for BG and type 2 diabetes.     Lab Results   Component Value Date    LABA1C 10.8 (H) 08/15/2016    HGBA1C 8.5 (H) 09/06/2024         Interval HPI:   Overnight events: No acute events overnight. Patient in room [address] Periop Pool*. Blood glucose stable. BG at and above goal on current insulin regimen (Home Insulin Pump). Patient was on his Omnipod throughout the surgery, but only received basal insulin via the pump.  Steroid use- None. Day of Surgery  Renal function- Abnormal - Creatinine 1.7   Vasopressors-  None       Endocrine will continue to follow and manage insulin orders  inpatient.         Diet NPO     Eating:   NPO  Nausea: No  Hypoglycemia and intervention: No  Fever: No  TPN and/or TF: No    PMH, PSH, FH, SH updated and reviewed     ROS:  Review of Systems   Constitutional:  Negative for unexpected weight change.   Eyes:  Negative for visual disturbance.   Respiratory:  Negative for cough.    Cardiovascular:  Negative for chest pain.   Gastrointestinal:  Negative for nausea and vomiting.   Endocrine: Negative for polydipsia and polyuria.   Musculoskeletal:  Negative for back pain.   Skin:  Negative for rash.   Neurological:  Negative for syncope.   Psychiatric/Behavioral:  Negative for agitation and dysphoric mood.        Current Medications and/or Treatments Impacting Glycemic Control  Immunotherapy:    Immunosuppressants       None          Steroids:   Hormones (From admission, onward)      None          Pressors:    Autonomic Drugs (From admission, onward)      None          Hyperglycemia/Diabetes Medications:   Antihyperglycemics (From admission, onward)      Start     Stop Route Frequency Ordered    09/07/24 0715  insulin aspart U-100 pen 14 Units         -- SubQ 3 times daily with meals 09/06/24 1858 09/06/24 2100  insulin glargine U-100 (Lantus) pen 40 Units         -- SubQ Nightly 09/06/24 1858 09/06/24 1957  insulin aspart U-100 pen 0-15 Units         -- SubQ Before meals, nightly and at 0200 PRN 09/06/24 1858             PHYSICAL EXAMINATION:  Vitals:    09/06/24 1815   BP: (!) 108/57   Pulse: 69   Resp: 14   Temp:      Body mass index is 34.11 kg/m².     Physical Exam  Constitutional:       Appearance: He is well-developed.   HENT:      Head: Normocephalic.   Eyes:      Conjunctiva/sclera: Conjunctivae normal.   Pulmonary:      Effort: Pulmonary effort is normal.   Musculoskeletal:         General: Normal range of motion.   Skin:     Comments: Wound Vac   Neurological:      Mental Status: He is alert and oriented to person, place, and time.            Labs  "Reviewed and Include   Recent Labs   Lab 09/06/24  0235   *   CALCIUM 9.4   ALBUMIN 2.4*   PROT 7.0   *   K 4.4   CO2 19*   CL 95   BUN 38*   CREATININE 1.7*   ALKPHOS 242*   ALT 32   AST 42*   BILITOT 0.6     Lab Results   Component Value Date    WBC 18.68 (H) 09/06/2024    HGB 11.2 (L) 09/06/2024    HCT 36 09/06/2024    MCV 85 09/06/2024     09/06/2024     No results for input(s): "TSH", "FREET4" in the last 168 hours.  Lab Results   Component Value Date    HGBA1C 8.5 (H) 09/06/2024       Nutritional status:   Body mass index is 34.11 kg/m².  Lab Results   Component Value Date    ALBUMIN 2.4 (L) 09/06/2024    ALBUMIN 2.4 (L) 07/23/2024    ALBUMIN 2.7 (L) 07/21/2024     Lab Results   Component Value Date    PREALBUMIN 3 (L) 09/06/2024    PREALBUMIN 13 (L) 07/18/2024       Estimated Creatinine Clearance: 46.6 mL/min (A) (based on SCr of 1.7 mg/dL (H)).    Accu-Checks  Recent Labs     09/06/24  0922 09/06/24  1220 09/06/24  1702   POCTGLUCOSE 216* 201* 256*        ASSESSMENT and PLAN    Renal/  DEEPALI (acute kidney injury)  Titrate insulin slowly to avoid hypoglycemia as the risk of hypoglycemia increases with decreased creatinine clearance.  Estimated Creatinine Clearance: 46.6 mL/min (A) (based on SCr of 1.7 mg/dL (H)).        Endocrine  Uncontrolled diabetes mellitus with hyperglycemia  Endocrinology consulted for BG management.   BG goal 140-180    At this time, recommend that the patient remain off of his pump since he cannot be controlled in the Auto mode. Patient does not interact with pump frequently and relies on the auto mode algorithm.     WBD 0.8 units/kg/day  - Lantus (Insulin Glargine) 40 units nightly   - Novolog (Insulin Aspart) 14 units TIDWM and prn for BG excursions MDC SSI (150/25)  - BG checks /HS/0200  - Hypoglycemia protocol in place    ** Please notify Endocrine for any change and/or advance in diet**  ** Please call Endocrine for any BG related issues **    Discharge " Planning:   TBD. Please notify endocrinology prior to discharge.        Orthopedic  * Surgical wound breakdown  Optimize BG control to improve wound healing            Plan discussed with patient, family, and RN at bedside.        Brandyn Malin, KAREEM, FNP  Endocrinology  [facility] - Surgery (2nd Fl)

## 2024-09-07 NOTE — ASSESSMENT & PLAN NOTE
Endocrinology consulted for BG management.   BG goal 140-180    At this time, recommend that the patient remain off of his pump since he cannot be controlled in the Auto mode. Patient does not interact with pump frequently and relies on the auto mode algorithm.     WBD 0.8 units/kg/day  - Lantus (Insulin Glargine) 40 units nightly   - Novolog (Insulin Aspart) 14 units TIDWM and prn for BG excursions [facility] SSI (150/25)  - BG checks AC/HS/0200  - Hypoglycemia protocol in place    ** Please notify Endocrine for any change and/or advance in diet**  ** Please call Endocrine for any BG related issues **    Discharge Planning:   TBD. Please notify endocrinology prior to discharge.

## 2024-09-07 NOTE — PROGRESS NOTES
[facility] Medicine  Progress Note    Patient Name: Cortes Schroeder  MRN: [id number]  Patient Class: IP- Inpatient   Admission Date: 9/6/2024  Length of Stay: 1 days  Attending Physician: Filemon Miner MD  Primary Care Provider: Andrew Sinclair Jr., MD        Subjective:     Principal Problem:Surgical wound breakdown        HPI:  Cortes Schroeder is a 64 y.o. M with PMHx of anxiety, T2DM, GERD, HLD, HTN who presented to ED for AMS. He had a fall in July that resulted in R trimalleolar fracture and L distal radius fracture. He had external fixation on 07/18 and then ORIF on 07/26 with Dr. Liz. He was prescribed clinda 300 mg on 08/28 for a ten day course. Patient was doing well when he acutely became altered and not acting like himself a few hours ago. Patient family member drove him here from [facility] for ortho consult. R medial ankle wound dehisced with redness and swelling around it. No CPM fever, cough, N/V/D or seizure.    In ED: T max 99.1. SIRS 2/4 with WBC 18 and . CMP notable for Na 127, Cr 1.7, . Phos 1.9. .3. BNP 73. Trop neg. A1c 8.5. R tib fib XR notes There are 2 abandoned anterior-posteriorly oriented pin tracks in the right mid tibial shaft.  On AP views, each of these pin tracks demonstrates a small faint internal density, possibly representing a sequestrum. Ortho and endocrine consulted. Given IV vanc and IV clindamycin. Given 2.7L IVFs. Admitted to hospital medicine for sepsis 2/2 infected hardware.     Overview/Hospital Course:  Cortes Schroeder is a 65 yo M admitted to hospital medicine for sepsis 2/2 R ankle infection s/p ORIF on 07/26. Started on IV vanc and cefepime. Given IVFs. Brought to OR with ortho on 09/06 for irrigation debridement of RLE. Endocrine following for BG. ID consulted as suspect patient will need long course of abx; agree with vanc and cefepime for now. Blood cultures with gram positive cocci. Repeats pending. Will  follow cultures. Plan to discharge home with  when medically ready.     Interval History: Febrile this AM to 101.1, but now afebrile. Consistently tachycardic with HR in the 110s. 2/2 blood cultures with gram positive cocci. Repeats pending. Continue IV vanc and cefepime. Giving additional IVFs. Seen today with roommate at bedside. Mentation improving. Reports 8/10 pain to R ankle. Reports some increased SOB and a nonproductive cough.     Review of Systems   Constitutional:  Positive for activity change and chills. Negative for fever.   Respiratory:  Positive for cough and shortness of breath. Negative for chest tightness.    Cardiovascular:  Negative for chest pain and leg swelling.   Gastrointestinal:  Negative for abdominal pain and nausea.   Musculoskeletal:  Positive for arthralgias.   Skin:  Positive for color change and wound.   Neurological:  Negative for dizziness and weakness.     Objective:     Vital Signs (Most Recent):  Temp: 97.4 °F (36.3 °C) (09/07/24 0440)  Pulse: (!) 111 (09/07/24 0659)  Resp: 18 (09/07/24 0440)  BP: (!) 145/65 (09/07/24 0440)  SpO2: (!) 94 % (09/07/24 0440) Vital Signs (24h Range):  Temp:  [97.4 °F (36.3 °C)-99.9 °F (37.7 °C)] 97.4 °F (36.3 °C)  Pulse:  [] 111  Resp:  [12-33] 18  SpO2:  [93 %-100 %] 94 %  BP: ()/(44-75) 145/65     Weight: 93 kg (205 lb 0.4 oz)  Body mass index is 34.12 kg/m².    Intake/Output Summary (Last 24 hours) at 9/7/2024 0757  Last data filed at 9/6/2024 2220  Gross per 24 hour   Intake 1550 ml   Output 1000 ml   Net 550 ml         Physical Exam  Vitals and nursing note reviewed.   Constitutional:       Appearance: He is well-developed. He is obese. He is ill-appearing.   Eyes:      Pupils: Pupils are equal, round, and reactive to light.   Cardiovascular:      Rate and Rhythm: Normal rate and regular rhythm.   Pulmonary:      Effort: Pulmonary effort is normal.      Breath sounds: Rales (L base) present.      Comments: On 1L NC  Abdominal:       Palpations: Abdomen is soft.      Tenderness: There is no abdominal tenderness.   Musculoskeletal:         General: No tenderness.   Skin:     General: Skin is warm and dry.      Comments: C/d/I dressing to R ankle with wound vac    Neurological:      Mental Status: He is alert and oriented to person, place, and time.   Psychiatric:         Behavior: Behavior normal.             Significant Labs: All pertinent labs within the past 24 hours have been reviewed.  BMP:   Recent Labs   Lab 09/06/24  0420 09/07/24  0519   GLU  --  193*   NA  --  132*   K  --  4.0   CL  --  104   CO2  --  18*   BUN  --  23   CREATININE  --  1.2   CALCIUM  --  8.5*   MG 2.3  --      CBC:   Recent Labs   Lab 09/06/24  0235 09/06/24  0242 09/07/24  0519   WBC 18.68*  --  17.10*   HGB 11.2*  --  10.1*   HCT 32.8* 36 30.6*     --  359       Significant Imaging: I have reviewed all pertinent imaging results/findings within the past 24 hours.    Assessment/Plan:      * Surgical wound breakdown  Closed trimalleolar fracture of right ankle s/p ORIF in July  65 yo M presenting with AMS and worsening redness, swelling and pain to R ankle.     - continue IV vanc and cefepime  - Ortho consulted;   - taken to OR today for debridement of surgical wound   - follow wound cultures  - follow blood cultures  - ID following     Bacteremia due to Gram-positive bacteria  - 2/2 blood cx with gram positive cocci. Rapid ID with MSSA  - Follow repeat blood cx  - ID following: continue IV vanc and cefepime     Sepsis  This patient does have evidence of infective focus  My overall impression is sepsis.  Source: Skin and Soft Tissue (location ankle)  Antibiotics given-   Antibiotics (72h ago, onward)      Start     Stop Route Frequency Ordered    09/07/24 0830  vancomycin 1,500 mg in D5W 250 mL IVPB (admixture device)         -- IV Once 09/07/24 0727    09/06/24 2130  ceFEPIme (MAXIPIME) 2 g in D5W 100 mL IVPB (MB+)         -- IV Every 12 hours (non-standard  "times) 09/06/24 1651    09/06/24 0911  vancomycin - pharmacy to dose  (vancomycin IVPB (PEDS and ADULTS))        Placed in "And" Linked Group    -- IV pharmacy to manage frequency 09/06/24 0811          Latest lactate reviewed-  Recent Labs   Lab 09/06/24  0536   POCLAC 1.23       Organ dysfunction indicated by Acute kidney injury and Encephalopathy    Fluid challenge Actual Body weight- Patient will receive 30ml/kg actual body weight to calculate fluid bolus for treatment of septic shock.     Post- resuscitation assessment No - Post resuscitation assessment not needed     Will Not start Pressors-   Source control achieved by: see above    On pre-exposure prophylaxis for HIV  - continue truvada     Hyponatremia  Hyponatremia is likely due to Dehydration/hypovolemia. The patient's most recent sodium results are listed below.  Recent Labs     09/06/24  0235 09/07/24  0519   * 132*       Plan  - Correct the sodium by 4-6mEq in 24 hours.   - Obtain the following studies: Urine sodium, urine osmolality, serum osmolality.  - Will treat the hyponatremia with IV fluids as follows: NS  - Monitor sodium Daily.   - Patient hyponatremia is improving    Uncontrolled diabetes mellitus with hyperglycemia  Patient's FSGs are not controlled on current hypoglycemics.   Last A1c reviewed-   Lab Results   Component Value Date    LABA1C 10.8 (H) 08/15/2016    HGBA1C 8.5 (H) 09/06/2024     Most recent fingerstick glucose reviewed-   Recent Labs   Lab 09/06/24  1702 09/06/24  2141 09/07/24  0859 09/07/24  1119   POCTGLUCOSE 256* 283* 186* 185*     Current correctional scale  Low  Maintain anti-hyperglycemic dose as follows-   Antihyperglycemics (From admission, onward)      Start     Stop Route Frequency Ordered    09/07/24 0715  insulin aspart U-100 pen 14 Units         -- SubQ 3 times daily with meals 09/06/24 1858    09/06/24 2100  insulin glargine U-100 (Lantus) pen 40 Units         -- SubQ Nightly 09/06/24 1858 09/06/24 1957 " " insulin aspart U-100 pen 0-15 Units         -- SubQ Before meals, nightly and at 0200 PRN 09/06/24 1858           - Endocrine consulted:  - At this time, recommend that the patient remain off of his pump since he cannot be controlled in the Auto mode. Patient does not interact with pump frequently and relies on the auto mode algorithm.   - Lantus (Insulin Glargine) 40 units nightly   - Novolog (Insulin Aspart) 14 units TIDWM and prn for BG excursions MDC SSI (150/25)  - hold oral antihyperglycemics during hospitalization   - needs better BG control for optimal wound healing     Closed trimalleolar fracture of right ankle  S/p ORIF 07/2024  Ortho consulted   - see surgical wound breakdown above    DEEPALI (acute kidney injury)  Resolved s/p fluids  DEEPALI is likely due to pre-renal azotemia due to dehydration. Baseline creatinine is  1 . Most recent creatinine and eGFR are listed below.  Recent Labs     09/06/24  0235 09/07/24  0519   CREATININE 1.7* 1.2   EGFRNORACEVR 44.7* >60.0        Plan  - DEEPALI is resolved  - Avoid nephrotoxins and renally dose meds for GFR listed above  - Monitor urine output, serial BMP, and adjust therapy as needed    Uncontrolled type 2 diabetes mellitus with diabetic polyneuropathy, with long-term current use of insulin  Patient's FSGs are uncontrolled due to hyperglycemia on current medication regimen.  Last A1c reviewed-   Lab Results   Component Value Date    LABA1C 10.8 (H) 08/15/2016    HGBA1C 9.9 (H) 07/18/2024     Most recent fingerstick glucose reviewed- No results for input(s): "POCTGLUCOSE" in the last 24 hours.  Current correctional scale  Low  Maintain anti-hyperglycemic dose as follows-   Antihyperglycemics (From admission, onward)      Start     Stop Route Frequency Ordered    09/06/24 0506  insulin aspart U-100 pen 0-5 Units         -- SubQ Every 6 hours PRN 09/06/24 0406          Hold Oral hypoglycemics while patient is in the hospital.      VTE Risk Mitigation (From admission, " onward)           Ordered     enoxaparin injection 40 mg  Daily         09/06/24 1735     IP VTE HIGH RISK PATIENT  Once         09/06/24 1735     Place sequential compression device  Until discontinued         09/06/24 1249                    Discharge Planning   JAYLEEN: 9/11/2024     Code Status: Full Code   Is the patient medically ready for discharge?:     Reason for patient still in hospital (select all that apply): Patient trending condition, Treatment, and Consult recommendations                     Jennifer Ochoa PA-C  Department of Hospital Medicine   [facility] - Surgery

## 2024-09-07 NOTE — ASSESSMENT & PLAN NOTE
Titrate insulin slowly to avoid hypoglycemia as the risk of hypoglycemia increases with decreased creatinine clearance.  Estimated Creatinine Clearance: 46.6 mL/min (A) (based on SCr of 1.7 mg/dL (H)).

## 2024-09-08 ENCOUNTER — ANESTHESIA EVENT (OUTPATIENT)
Dept: SURGERY | Facility: HOSPITAL | Age: 64
End: 2024-09-08
Payer: COMMERCIAL

## 2024-09-08 LAB
ALBUMIN SERPL BCP-MCNC: 1.7 G/DL (ref 3.5–5.2)
ALP SERPL-CCNC: 253 U/L (ref 55–135)
ALT SERPL W/O P-5'-P-CCNC: 47 U/L (ref 10–44)
ANION GAP SERPL CALC-SCNC: 8 MMOL/L (ref 8–16)
AST SERPL-CCNC: 79 U/L (ref 10–40)
BACTERIA BLD CULT: ABNORMAL
BILIRUB SERPL-MCNC: 0.7 MG/DL (ref 0.1–1)
BUN SERPL-MCNC: 16 MG/DL (ref 8–23)
CALCIUM SERPL-MCNC: 8.6 MG/DL (ref 8.7–10.5)
CHLORIDE SERPL-SCNC: 104 MMOL/L (ref 95–110)
CO2 SERPL-SCNC: 20 MMOL/L (ref 23–29)
CREAT SERPL-MCNC: 1.2 MG/DL (ref 0.5–1.4)
ERYTHROCYTE [DISTWIDTH] IN BLOOD BY AUTOMATED COUNT: 15.2 % (ref 11.5–14.5)
EST. GFR  (NO RACE VARIABLE): >60 ML/MIN/1.73 M^2
GLUCOSE SERPL-MCNC: 111 MG/DL (ref 70–110)
HCT VFR BLD AUTO: 29.3 % (ref 40–54)
HGB BLD-MCNC: 9.8 G/DL (ref 14–18)
LACTATE SERPL-SCNC: 1.7 MMOL/L (ref 0.5–2.2)
MAGNESIUM SERPL-MCNC: 1.9 MG/DL (ref 1.6–2.6)
MCH RBC QN AUTO: 28.2 PG (ref 27–31)
MCHC RBC AUTO-ENTMCNC: 33.4 G/DL (ref 32–36)
MCV RBC AUTO: 84 FL (ref 82–98)
OHS QRS DURATION: 72 MS
OHS QTC CALCULATION: 399 MS
PHOSPHATE SERPL-MCNC: 2 MG/DL (ref 2.7–4.5)
PLATELET # BLD AUTO: 416 K/UL (ref 150–450)
PMV BLD AUTO: 10.2 FL (ref 9.2–12.9)
POCT GLUCOSE: 145 MG/DL (ref 70–110)
POCT GLUCOSE: 159 MG/DL (ref 70–110)
POCT GLUCOSE: 204 MG/DL (ref 70–110)
POCT GLUCOSE: 90 MG/DL (ref 70–110)
POTASSIUM SERPL-SCNC: 3.9 MMOL/L (ref 3.5–5.1)
PROT SERPL-MCNC: 5.8 G/DL (ref 6–8.4)
RBC # BLD AUTO: 3.47 M/UL (ref 4.6–6.2)
SODIUM SERPL-SCNC: 132 MMOL/L (ref 136–145)
VANCOMYCIN SERPL-MCNC: 12.9 UG/ML
WBC # BLD AUTO: 21.23 K/UL (ref 3.9–12.7)

## 2024-09-08 PROCEDURE — 83735 ASSAY OF MAGNESIUM: CPT

## 2024-09-08 PROCEDURE — 85027 COMPLETE CBC AUTOMATED: CPT

## 2024-09-08 PROCEDURE — 93005 ELECTROCARDIOGRAM TRACING: CPT

## 2024-09-08 PROCEDURE — 25000003 PHARM REV CODE 250: Performed by: STUDENT IN AN ORGANIZED HEALTH CARE EDUCATION/TRAINING PROGRAM

## 2024-09-08 PROCEDURE — 25000003 PHARM REV CODE 250: Performed by: INTERNAL MEDICINE

## 2024-09-08 PROCEDURE — 63600175 PHARM REV CODE 636 W HCPCS

## 2024-09-08 PROCEDURE — 80053 COMPREHEN METABOLIC PANEL: CPT

## 2024-09-08 PROCEDURE — 99232 SBSQ HOSP IP/OBS MODERATE 35: CPT | Mod: ,,, | Performed by: NURSE PRACTITIONER

## 2024-09-08 PROCEDURE — 25000003 PHARM REV CODE 250

## 2024-09-08 PROCEDURE — 21400001 HC TELEMETRY ROOM

## 2024-09-08 PROCEDURE — 36415 COLL VENOUS BLD VENIPUNCTURE: CPT | Performed by: INTERNAL MEDICINE

## 2024-09-08 PROCEDURE — 87040 BLOOD CULTURE FOR BACTERIA: CPT | Mod: 59

## 2024-09-08 PROCEDURE — 80202 ASSAY OF VANCOMYCIN: CPT | Performed by: INTERNAL MEDICINE

## 2024-09-08 PROCEDURE — 63600175 PHARM REV CODE 636 W HCPCS: Performed by: STUDENT IN AN ORGANIZED HEALTH CARE EDUCATION/TRAINING PROGRAM

## 2024-09-08 PROCEDURE — 36415 COLL VENOUS BLD VENIPUNCTURE: CPT

## 2024-09-08 PROCEDURE — 83605 ASSAY OF LACTIC ACID: CPT | Performed by: INTERNAL MEDICINE

## 2024-09-08 PROCEDURE — 93010 ELECTROCARDIOGRAM REPORT: CPT | Mod: ,,, | Performed by: INTERNAL MEDICINE

## 2024-09-08 PROCEDURE — 84100 ASSAY OF PHOSPHORUS: CPT

## 2024-09-08 PROCEDURE — 99233 SBSQ HOSP IP/OBS HIGH 50: CPT | Mod: ,,, | Performed by: STUDENT IN AN ORGANIZED HEALTH CARE EDUCATION/TRAINING PROGRAM

## 2024-09-08 PROCEDURE — 63600175 PHARM REV CODE 636 W HCPCS: Performed by: INTERNAL MEDICINE

## 2024-09-08 RX ORDER — INSULIN GLARGINE 100 [IU]/ML
45 INJECTION, SOLUTION SUBCUTANEOUS DAILY
Status: DISCONTINUED | OUTPATIENT
Start: 2024-09-08 | End: 2024-09-10

## 2024-09-08 RX ADMIN — INSULIN ASPART 14 UNITS: 100 INJECTION, SOLUTION INTRAVENOUS; SUBCUTANEOUS at 09:09

## 2024-09-08 RX ADMIN — MORPHINE SULFATE 4 MG: 4 INJECTION INTRAVENOUS at 09:09

## 2024-09-08 RX ADMIN — GABAPENTIN 1200 MG: 400 CAPSULE ORAL at 09:09

## 2024-09-08 RX ADMIN — ACETAMINOPHEN 1000 MG: 500 TABLET ORAL at 09:09

## 2024-09-08 RX ADMIN — EMTRICITABINE AND TENOFOVIR DISOPROXIL FUMARATE 1 TABLET: 200; 300 TABLET, FILM COATED ORAL at 09:09

## 2024-09-08 RX ADMIN — CEFAZOLIN 2 G: 2 INJECTION, POWDER, FOR SOLUTION INTRAMUSCULAR; INTRAVENOUS at 12:09

## 2024-09-08 RX ADMIN — ENOXAPARIN SODIUM 40 MG: 40 INJECTION SUBCUTANEOUS at 04:09

## 2024-09-08 RX ADMIN — PIPERACILLIN SODIUM AND TAZOBACTAM SODIUM 4.5 G: 4; .5 INJECTION, POWDER, FOR SOLUTION INTRAVENOUS at 04:09

## 2024-09-08 RX ADMIN — ATORVASTATIN CALCIUM 40 MG: 40 TABLET, FILM COATED ORAL at 09:09

## 2024-09-08 RX ADMIN — MORPHINE SULFATE 4 MG: 4 INJECTION INTRAVENOUS at 05:09

## 2024-09-08 RX ADMIN — CEFAZOLIN 2 G: 2 INJECTION, POWDER, FOR SOLUTION INTRAMUSCULAR; INTRAVENOUS at 11:09

## 2024-09-08 RX ADMIN — INSULIN ASPART 14 UNITS: 100 INJECTION, SOLUTION INTRAVENOUS; SUBCUTANEOUS at 12:09

## 2024-09-08 RX ADMIN — GABAPENTIN 600 MG: 300 CAPSULE ORAL at 09:09

## 2024-09-08 RX ADMIN — VANCOMYCIN HYDROCHLORIDE 1750 MG: 500 INJECTION, POWDER, LYOPHILIZED, FOR SOLUTION INTRAVENOUS at 10:09

## 2024-09-08 RX ADMIN — INSULIN ASPART 6 UNITS: 100 INJECTION, SOLUTION INTRAVENOUS; SUBCUTANEOUS at 09:09

## 2024-09-08 RX ADMIN — CEFAZOLIN 2 G: 2 INJECTION, POWDER, FOR SOLUTION INTRAMUSCULAR; INTRAVENOUS at 05:09

## 2024-09-08 RX ADMIN — INSULIN ASPART 14 UNITS: 100 INJECTION, SOLUTION INTRAVENOUS; SUBCUTANEOUS at 04:09

## 2024-09-08 RX ADMIN — INSULIN GLARGINE 45 UNITS: 100 INJECTION, SOLUTION SUBCUTANEOUS at 10:09

## 2024-09-08 RX ADMIN — MORPHINE SULFATE 2 MG: 2 INJECTION, SOLUTION INTRAMUSCULAR; INTRAVENOUS at 01:09

## 2024-09-08 RX ADMIN — NORTRIPTYLINE HYDROCHLORIDE 50 MG: 25 CAPSULE ORAL at 09:09

## 2024-09-08 NOTE — PROGRESS NOTES
[facility] - [facility]  Endocrinology  Progress Note    Admit Date: 9/6/2024     Reason for Consult: Management of T2DM, Hyperglycemia      Surgical Procedure and Date: S/P irrigation debridement of RLE on 09/06/2024    Diabetes diagnosis year: 1995     Home Diabetes Medications:    Humalog via OmniPod insulin pump  Mounjaro 10 mg weekly     Current pump settings:  Basal 12 am to 2.3 and 6 am to 1.55  ICR to 3 at 12 am, 2 at 4 pm  ISF to 1:20   AIT 3 hrs  Target 110-120        Patient had anaphylaxis reaction to SGLT2 inhibitors specifically Invokana     How often checking glucose at home? Dexcom G6   BG readings on regimen: Average 210's per Dexcom  Hypoglycemia on the regimen?  Yes  Missed doses on regimen?  No     Diabetes Complications include:     Hyperglycemia and Diabetic peripheral neuropathy         Complicating diabetes co morbidities:   HLD, HTN, Obesity         HPI: 64 y.o. male presents to the ED w/ complaint of altered mental status. Hx of R ankle fracture with surgery over a month ago. Patient now presents with sepsis 2/2 R ankle infection s/p ORIF on 07/26. Started on IV vanc and cefepime. Given IVFs. Blood cultures pending. Brought to OR with ortho on 09/06 for irrigation debridement of RLE. Endocrine following for BG and type 2 diabetes.     Lab Results   Component Value Date    LABA1C 10.8 (H) 08/15/2016    HGBA1C 8.5 (H) 09/06/2024         Interval HPI:   Overnight events: No acute events overnight. Patient in room 542/542 A. Blood glucose stable. BG at goal on current insulin regimen (SSI, prandial, and basal insulin ). Steroid use- None. 2 Days Post-Op  Renal function- Normal   Vasopressors-  None     Of note, the patient's basal insulin was held overnight. Endocrine on-call not notified. Timed the Lantus for this morning so that the patient doesn't have subsequent hyperglycemia . 0200 BG check not done.    Endocrine will continue to follow and manage insulin orders inpatient.       Diet NPO  Diet  "NPO     Eating:   NPO  Nausea: No  Hypoglycemia and intervention: No  Fever: No  TPN and/or TF: No    BP (!) 141/69 (Patient Position: Lying)   Pulse (!) 114   Temp 98.9 °F (37.2 °C) (Oral)   Resp 18   Ht 5' 5" (1.651 m)   Wt 93 kg (205 lb 0.4 oz)   SpO2 95%   BMI 34.12 kg/m²     Labs Reviewed and Include    Recent Labs   Lab 09/08/24  0536   *   CALCIUM 8.6*   ALBUMIN 1.7*   PROT 5.8*   *   K 3.9   CO2 20*      BUN 16   CREATININE 1.2   ALKPHOS 253*   ALT 47*   AST 79*   BILITOT 0.7     Lab Results   Component Value Date    WBC 21.23 (H) 09/08/2024    HGB 9.8 (L) 09/08/2024    HCT 29.3 (L) 09/08/2024    MCV 84 09/08/2024     09/08/2024     No results for input(s): "TSH", "FREET4" in the last 168 hours.  Lab Results   Component Value Date    HGBA1C 8.5 (H) 09/06/2024       Nutritional status:   Body mass index is 34.12 kg/m².  Lab Results   Component Value Date    ALBUMIN 1.7 (L) 09/08/2024    ALBUMIN 2.4 (L) 09/06/2024    ALBUMIN 2.4 (L) 07/23/2024     Lab Results   Component Value Date    PREALBUMIN 3 (L) 09/06/2024    PREALBUMIN 13 (L) 07/18/2024       Estimated Creatinine Clearance: 66 mL/min (based on SCr of 1.2 mg/dL).    Accu-Checks  Recent Labs     09/06/24  0922 09/06/24  1220 09/06/24  1702 09/06/24  2141 09/07/24  0859 09/07/24  1119 09/07/24  1544 09/07/24  2102 09/08/24  0746   POCTGLUCOSE 216* 201* 256* 283* 186* 185* 189* 80 145*       Current Medications and/or Treatments Impacting Glycemic Control  Immunotherapy:    Immunosuppressants       None          Steroids:   Hormones (From admission, onward)      None          Pressors:    Autonomic Drugs (From admission, onward)      None          Hyperglycemia/Diabetes Medications:   Antihyperglycemics (From admission, onward)      Start     Stop Route Frequency Ordered    09/08/24 0936  insulin glargine U-100 (Lantus) pen 45 Units         -- SubQ Daily 09/08/24 0937    09/07/24 0715  insulin aspart U-100 pen 14 Units      "    -- SubQ 3 times daily with meals 09/06/24 1858 09/06/24 1957  insulin aspart U-100 pen 0-15 Units         -- SubQ Before meals, nightly and at 0200 PRN 09/06/24 1858            ASSESSMENT and PLAN    Renal/  DEEPALI (acute kidney injury)  Titrate insulin slowly to avoid hypoglycemia as the risk of hypoglycemia increases with decreased creatinine clearance.  Estimated Creatinine Clearance: 66 mL/min (based on SCr of 1.2 mg/dL).        Endocrine  Uncontrolled diabetes mellitus with hyperglycemia  Endocrinology consulted for BG management.   BG goal 140-180    At this time, recommend that the patient remain off of his pump since he cannot be controlled in the Auto mode. Patient does not interact with pump frequently and relies on the auto mode algorithm.     WBD 0.8 units/kg/day  - Lantus (Insulin Glargine) 45 units nightly   - Novolog (Insulin Aspart) 14 units TIDWM (Hold if NPO) and prn for BG excursions MDC SSI (150/25)  - BG checks AC/HS/0200  - Hypoglycemia protocol in place    ** Please notify Endocrine for any change and/or advance in diet**  ** Please call Endocrine for any BG related issues **    Discharge Planning:   TBD. Please notify endocrinology prior to discharge.        Orthopedic  * Surgical wound breakdown  Optimize BG control to improve wound healing             Brandyn Malin, DNP, FNP  Endocrinology  [facility] - [facility]

## 2024-09-08 NOTE — NURSING
Nurses Note -- 4 Eyes      9/7/2024   10:24 PM      Skin assessed during: Daily Assessment      [x] No Altered Skin Integrity Present    []Prevention Measures Documented      [] Yes- Altered Skin Integrity Present or Discovered   [] LDA Added if Not in Epic (Describe Wound)   [] New Altered Skin Integrity was Present on Admit and Documented in LDA   [] Wound Image Taken    Wound Care Consulted? No    Attending Nurse:  Luciana Hoffmann RN/Staff Member:   NIEVES Gonzalez

## 2024-09-08 NOTE — ANESTHESIA PREPROCEDURE EVALUATION
[facility]  Anesthesia Pre-Operative Evaluation         Patient Name: Cortes Schroeder  YOB: 1960  MRN: [id number]    SUBJECTIVE:     Pre-operative evaluation for Procedure(s) (LRB):  IRRIGATION AND DEBRIDEMENT, LOWER EXTREMITY - WITH WOUND VAC CHANGE, RIGHT ANKLE (Right)     09/08/2024    Cortes Schroeder is a 64 y.o. male w/ a significant PMHx of anxiety, T2DM, GERD, HLD, HTN who presented to ED for AMS. He had a fall in July that resulted in R trimalleolar fracture and L distal radius fracture. He had external fixation on 07/18 and then ORIF on 07/26 with Dr. Liz. He is now septic. On evaluation patient is alert and oriented to person, place, and year. He has increased SOB and a nonproductive cough with a slight wheeze on 2L NC.    Patient now presents for the above procedure(s).    Results for orders placed during the hospital encounter of 07/18/24    Echo    Interpretation Summary    Left Ventricle: The left ventricle is normal in size. Normal wall thickness. There is concentric remodeling. There is normal systolic function with a visually estimated ejection fraction of 60 - 65%. There is normal diastolic function.    Right Ventricle: Normal right ventricular cavity size. Wall thickness is normal. Systolic function is normal.    Pulmonary Artery: There is borderline elevated pulmonary hypertension. The estimated pulmonary artery systolic pressure is > 36 mmHg.    IVC/SVC: IVC was not well visualized due to poor acoustic window.      LDA:        Peripheral IV - Single Lumen 09/06/24 0240 20 G Anterior;Distal;Right Upper Arm (Active)   Site Assessment Clean;Dry;Intact 09/08/24 1600   Extremity Assessment Distal to IV No abnormal discoloration;No redness;No swelling;No warmth 09/08/24 0755   Line Status Infusing 09/08/24 1600   Dressing Status Clean;Dry;Intact 09/08/24  1600   Dressing Intervention Integrity maintained 09/08/24 1600   Reason Not Rotated Not due 09/06/24 1300   Number of days: 2            Peripheral IV - Single Lumen 09/08/24 1252 Left;Posterior Forearm (Active)   Site Assessment Clean;Dry;Intact 09/08/24 1600   Line Status Saline locked 09/08/24 1600   Dressing Status Clean;Dry;Intact 09/08/24 1600   Dressing Intervention Integrity maintained 09/08/24 1600   Number of days: 0       Prev airway: (7/18/24):    Induction:  Intravenous    Intubated:  Postinduction    Mask Ventilation:  Easy with oral airway    Attempts:  1    Attempted By:  CRNA    Method of Intubation:  Video laryngoscopy    Blade:  Bravo 3    Laryngeal View Grade: Grade I - full view of cords      Difficult Airway Encountered?: No      Complications:  None    Airway Device:  Oral endotracheal tube    Airway Device Size:  7.5    Style/Cuff Inflation:  Cuffed (inflated to minimal occlusive pressure)    Tube secured:  24    Secured at:  The lips    Placement Verified By:  Capnometry    Complicating Factors:  None    Drips: None documented.      Patient Active Problem List   Diagnosis    Hyperlipidemia    Anxiety    Chronic bilateral low back pain without sciatica    Neuropathy    Uncontrolled type 2 diabetes mellitus with diabetic polyneuropathy, with long-term current use of insulin    NPDR (nonproliferative diabetic retinopathy)    Insomnia    Gastroesophageal reflux disease without esophagitis    Chronic pain syndrome    DEEPALI (acute kidney injury)    Diabetic nephropathy associated with type 2 diabetes mellitus    Right sided weakness    Cervical syndrome    Chronic neck pain    Muscle weakness    Impaired motor control    Decreased range of motion (ROM) of shoulder    Cervical spondylosis without myelopathy    Neuroforaminal stenosis of cervical spine    Right cervical radiculopathy    Cervical radiculopathy    DDD (degenerative disc disease), lumbar    Insulin pump status    Obesity    High risk  homosexual behavior    Lumbar radiculopathy    Lumbar spondylosis    Left lumbar radiculitis    Closed trimalleolar fracture of right ankle    Hypertension, essential    Polyneuropathy due to type 2 diabetes mellitus    Uncontrolled diabetes mellitus with hyperglycemia    Hyponatremia    Abnormal thyroid blood test    Closed fracture of left distal radius    Multiple falls    Class 1 obesity due to excess calories with serious comorbidity and body mass index (BMI) of 33.0 to 33.9 in adult    Sepsis    Surgical wound breakdown    Surgical site infection    Bacteremia due to Gram-positive bacteria    On pre-exposure prophylaxis for HIV       Review of patient's allergies indicates:   Allergen Reactions    Invokana [canagliflozin] Anaphylaxis    Percocet [oxycodone-acetaminophen] Nausea Only and Hallucinations    Biaxin [clarithromycin]     Hydrocodone Other (See Comments)     Dizzy/nausea/hallucinations    Sulfa (sulfonamide antibiotics) Nausea Only and Rash       Current Inpatient Medications:   atorvastatin  40 mg Oral Daily    ceFAZolin (Ancef) IV (PEDS and ADULTS)  2 g Intravenous Q6H    emtricitabine-tenofovir 200-300 mg  1 tablet Oral Daily    enoxparin  40 mg Subcutaneous Daily    gabapentin  600 mg Oral Daily    And    gabapentin  1,200 mg Oral QHS    insulin aspart U-100  14 Units Subcutaneous TIDWM    insulin glargine U-100  45 Units Subcutaneous Daily    nortriptyline  50 mg Oral Nightly       No current facility-administered medications on file prior to encounter.     Current Outpatient Medications on File Prior to Encounter   Medication Sig Dispense Refill    acetaminophen (TYLENOL) 325 MG tablet Take 2 tablets (650 mg total) by mouth every 6 (six) hours.      aspirin (ECOTRIN) 81 MG EC tablet Take 1 tablet (81 mg total) by mouth 2 (two) times a day. End date sept 20, 2024      atorvastatin (LIPITOR) 40 MG tablet Take 40 mg by mouth once daily.      blood-glucose sensor (DEXCOM G6 SENSOR) Omaira Change  sensor every 10 days 3 each PRN    blood-glucose transmitter (DEXCOM G6 TRANSMITTER) Omaira Change transmitter every 3 months 1 each PRN    celecoxib (CELEBREX) 200 MG capsule Take 1 capsule (200 mg total) by mouth once daily.      cyanocobalamin (VITAMIN B-12) 1000 MCG tablet Take 1,000 mcg by mouth once daily.      DULoxetine (CYMBALTA) 60 MG capsule Take 1 capsule (60 mg total) by mouth once daily. 90 capsule 0    emtricitabine-tenofovir 200-300 mg (TRUVADA) 200-300 mg Tab Take 1 tablet by mouth once daily. 30 tablet 0    enoxaparin (LOVENOX) 40 mg/0.4 mL Syrg Inject 40 mg into the skin Daily.      fish oil-omega-3 fatty acids 300-1,000 mg capsule Take 1 capsule by mouth 2 (two) times daily.      gabapentin (NEURONTIN) 300 MG capsule Take 2 capsules (600 mg total) by mouth once daily AND 4 capsules (1,200 mg total) every evening.      HUMALOG U-100 INSULIN 100 unit/mL injection 1:20 U PRN high blood sugar and snacks. Correction dose- Enter carb coverage intake upon administration  Target number 120 Carbohydrate coverage #1 1:2 Carbohydrate coverage #1 time [id number] Carbohydrate coverage #2 1:3 Carbohydrate coverage #2 time [id number] Carbohydrate coverage #3 Carbohydrate coverage #3 time Carbohydrate coverage #4 Carbohydrate coverage #4 time Sensitivity #1 1:20 Sensitivity #1 time 1811-4811 Sensitivity #2 Sensitivity #2 time Sensitivity #3 Sensitivity #3 time Sensitivity #4 Sensitivity #4 time Sensitivity #5 Sensitivity #5 time Order Questions Question Answer Comment       50 U SQ continuous  Target number 120 Basal Rate #1 2.3 Basal rate #1 time 9518-9485 Basal Rate #2 1.55 Basal rate #2 time 8541-6043 Basal rate #3 Basal rate #3 time Basal rate #4 Basal rate #4 time Basal rate #5 Basal rate #5 time      losartan (COZAAR) 25 MG tablet Take 1 tablet (25 mg total) by mouth once daily.      magnesium oxide (MAG-OX) 400 mg (241.3 mg magnesium) tablet Take 0.5 tablets (200 mg total) by mouth once daily.      melatonin  (MELATIN) 3 mg tablet Take 2 tablets (6 mg total) by mouth nightly as needed for Insomnia.      methocarbamoL (ROBAXIN) 500 MG Tab Take 1 tablet (500 mg total) by mouth 4 (four) times daily as needed (pain scale 4-7).      morphine (MSIR) 15 MG tablet Take 1 tablet (15 mg total) by mouth every 4 (four) hours as needed (pain scale 6-10).      MOUNJARO 10 mg/0.5 mL PnIj Inject 10 mg into the skin once a week. HOLD until all surgeries completed and out of rehab      nortriptyline (PAMELOR) 50 MG capsule Take 1 capsule (50 mg total) by mouth nightly. 90 capsule 0    ondansetron (ZOFRAN-ODT) 4 MG TbDL Take 2 tablets (8 mg total) by mouth every 6 (six) hours as needed (nausea).      polyethylene glycol (GLYCOLAX) 17 gram PwPk Take 17 g by mouth once daily.      rosuvastatin (CRESTOR) 10 MG tablet Take 10 mg by mouth every evening.      senna-docusate 8.6-50 mg (PERICOLACE) 8.6-50 mg per tablet Take 1 tablet by mouth 2 (two) times daily.      vitamin D 1000 units Tab Take 1,000 Units by mouth 2 (two) times daily.      [DISCONTINUED] insulin aspart U-100 (NOVOLOG U-100 INSULIN ASPART) 100 unit/mL injection Use in insulin pump. Max daily dose 150 units. 50 mL 5       Past Surgical History:   Procedure Laterality Date    APPLICATION, EXTERNAL FIXATION DEVICE, FOR ANKLE FRACTURE Right 7/18/2024    Procedure: APPLICATION, EXTERNAL FIXATION DEVICE, FOR ANKLE FRACTURE;  Surgeon: Moe Liz MD;  Location: [facility] OR [address] [place] [address];  Service: Orthopedics;  Laterality: Right;    BACK SURGERY  2003    Lumbar Spine    CATARACT EXTRACTION      CLOSED REDUCTION, FRACTURE, ANKLE, TRIMALLEOLAR Right 7/18/2024    Procedure: CLOSED REDUCTION, FRACTURE, ANKLE, TRIMALLEOLAR;  Surgeon: Moe Liz MD;  Location: [facility] OR [address] [place] [address];  Service: Orthopedics;  Laterality: Right;    EPIDURAL STEROID INJECTION N/A 1/16/2019    Procedure: Injection, Steroid, Epidural Cervical;  Surgeon: Andrwe Sinclair Jr., MD;  Location: [facility];  Service:  Pain Management;  Laterality: N/A;  Cervical Epidural Steroid Injection C7-T1    95824    Arrive @ 1240    EPIDURAL STEROID INJECTION N/A 2/20/2019    Procedure: Injection, Steroid, Epidural;  Surgeon: Andrew Sinclair Jr., MD;  Location: [facility] ENDO;  Service: Pain Management;  Laterality: N/A;  Lumbar Epidural Steroid Injection L4-5    [id number]    Arrive @ 1215 (requests latest time)    FIXATION OF SYNDESMOSIS OF ANKLE Right 7/26/2024    Procedure: FIXATION, SYNDESMOSIS, ANKLE;  Surgeon: Moe Liz MD;  [facility];  Service: Orthopedics;  Laterality: Right;    INJECTION, SPINE, LUMBOSACRAL, TRANSFORAMINAL APPROACH Bilateral 6/14/2024    Procedure: Bilateral L5 Transforaminal Epidural Steroid Injections;  Surgeon: Andrew Sinclair Jr., MD;  Location: [facility] PAIN MANAGEMENT;  Service: Pain Management;  Laterality: Bilateral;  @1300(given)  ASA last 6/8  Check BG  MD Sign.    OPEN REDUCTION AND INTERNAL FIXATION (ORIF) OF FRACTURE OF DISTAL RADIUS Left 7/19/2024    Procedure: ORIF, FRACTURE, RADIUS, DISTAL - LEFT;  Surgeon: Moe Liz MD;  [facility];  Service: Orthopedics;  Laterality: Left;  LEFT, SYNTHES, C ARM    OPEN REDUCTION AND INTERNAL FIXATION (ORIF) OF INJURY OF ANKLE Right 7/26/2024    Procedure: ORIF, ANKLE;  Surgeon: Meo Liz MD;  Location[facility];  Service: Orthopedics;  Laterality: Right;    REMOVAL OF EXTERNAL FIXATION DEVICE Right 7/26/2024    Procedure: REMOVAL, EXTERNAL FIXATION DEVICE;  Surgeon: Moe Liz MD;  [facility];  Service: Orthopedics;  Laterality: Right;       Social History     Socioeconomic History    Marital status:    Tobacco Use    Smoking status: Never    Smokeless tobacco: Never   Substance and Sexual Activity    Alcohol use: Yes     Alcohol/week: 1.0 standard drink of alcohol     Types: 1 Glasses of wine per week     Comment: occasionally    Drug use: No    Sexual activity: Not  Currently     Partners: Male     Birth control/protection: Condom     Comment: 10/2/17      Social Determinants of Health     Financial Resource Strain: Low Risk  (7/24/2024)    Received from [facility]    Overall Financial Resource Strain (CARDIA)     Difficulty of Paying Living Expenses: Not hard at all   Recent Concern: Financial Resource Strain - Medium Risk (7/22/2024)    Overall Financial Resource Strain (CARDIA)     Difficulty of Paying Living Expenses: Somewhat hard   Food Insecurity: No Food Insecurity (7/24/2024)    Received from [facility]    Hunger Vital Sign     Worried About Running Out of Food in the Last Year: Never true     Ran Out of Food in the Last Year: Never true   Recent Concern: Food Insecurity - Food Insecurity Present (7/22/2024)    Hunger Vital Sign     Worried About Running Out of Food in the Last Year: Sometimes true     Ran Out of Food in the Last Year: Sometimes true   Transportation Needs: No Transportation Needs (8/8/2024)    Received from [facility]     SDOH Transportation Source     Has lack of transportation kept you from medical appointments or from getting medications?: No     Has lack of transportation kept you from meetings, work, or from getting things needed for daily living?: No   Physical Activity: Insufficiently Active (7/22/2024)    Exercise Vital Sign     Days of Exercise per Week: 2 days     Minutes of Exercise per Session: 20 min   Stress: Stress Concern Present (7/23/2024)    Received from [facility] - Occupational Stress Questionnaire     Feeling of Stress : To some extent   Housing Stability: Low Risk  (2/23/2024)    Housing Stability Vital Sign     Unable to Pay for Housing in the Last Year: No     Number of Places Lived in the Last Year: 1     Unstable Housing in the Last Year: No       OBJECTIVE:     Vital Signs Range (Last 24H):  Temp:  [36.4 °C (97.6 °F)-39.4 °C (102.9 °F)]   Pulse:  []    Resp:  [16-20]   BP: ()/(51-83)   SpO2:  [91 %-98 %]       Significant Labs:  Lab Results   Component Value Date    WBC 21.23 (H) 09/08/2024    HGB 9.8 (L) 09/08/2024    HCT 29.3 (L) 09/08/2024     09/08/2024    CHOL 104 09/23/2021    TRIG 203 (H) 09/23/2021    HDL 38 (L) 09/23/2021    ALT 47 (H) 09/08/2024    AST 79 (H) 09/08/2024     (L) 09/08/2024    K 3.9 09/08/2024     09/08/2024    CREATININE 1.2 09/08/2024    BUN 16 09/08/2024    CO2 20 (L) 09/08/2024    TSH 6.243 (H) 07/18/2024    INR 1.0 09/06/2024    HGBA1C 8.5 (H) 09/06/2024    MICROALBUR 48.0 04/27/2022       Diagnostic Studies: No relevant studies.    EKG:   Results for orders placed or performed during the hospital encounter of 09/06/24   EKG 12-lead    Collection Time: 09/08/24  9:39 AM   Result Value Ref Range    QRS Duration 72 ms    [facility] QTC Calculation 399 ms    Narrative    Test Reason : R00.0,    Vent. Rate : 114 BPM     Atrial Rate : 114 BPM     P-R Int : 208 ms          QRS Dur : 072 ms      QT Int : 290 ms       P-R-T Axes : 040 -11 044 degrees     QTc Int : 399 ms    Sinus tachycardia  Inferior infarct ,age undetermined  Abnormal ECG  When compared with ECG of 06-SEP-2024 02:27,  Criteria for Inferior infarct is now Present  Confirmed by Jolie Weiss MD ([id number]) on 9/8/2024 12:06:07 PM    Referred By: [email or web address]   SELF           Confirmed By:Jolie Weiss MD       2D ECHO:  TTE:  Results for orders placed or performed during the hospital encounter of 07/18/24   Echo   Result Value Ref Range    BSA 2.05 m2    LVOT stroke volume 62.46 cm3    LVIDd 3.69 3.5 - 6.0 cm    LV Systolic Volume 12.92 mL    LV Systolic Volume Index 6.5 mL/m2    LVIDs 2.01 (A) 2.1 - 4.0 cm    LV Diastolic Volume 57.83 mL    LV Diastolic Volume Index 29.06 mL/m2    IVS 1.01 0.6 - 1.1 cm    LVOT diameter 1.93 cm    LVOT area 2.9 cm2    FS 46 (A) 28 - 44 %    Left Ventricle Relative Wall Thickness 0.58 cm    Posterior Wall 1.07 0.6 - 1.1 cm    LV  mass 118.62 g    LV Mass Index 60 g/m2    MV Peak E Ryan 0.75 m/s    TDI LATERAL 0.12 m/s    TDI SEPTAL 0.10 m/s    E/E' ratio 6.82 m/s    MV Peak A Ryan 0.90 m/s    TR Max Ryan 3.00 m/s    E/A ratio 0.83     IVRT 117.03 msec    E wave deceleration time 152.71 msec    LV SEPTAL E/E' RATIO 7.50 m/s    LA Volume Index 25.6 mL/m2    LV LATERAL E/E' RATIO 6.25 m/s    LA volume 50.91 cm3    LVOT peak ryan 1.12 m/s    RV- morales basal diam 2.7 cm    TAPSE 1.64 cm    LA size 3.24 cm    Left Atrium Minor Axis 5.12 cm    Left Atrium Major Axis 5.01 cm    LA volume (mod) 44.90 cm3    LA WIDTH 3.65 cm    LA Volume Index (Mod) 22.6 mL/m2    RA Major Axis 4.09 cm    RA Width 2.88 cm    AV mean gradient 4 mmHg    AV peak gradient 6 mmHg    Ao peak ryan 1.23 m/s    Ao VTI 21.59 cm    LVOT peak VTI 21.36 cm    AV valve area 2.89 cm²    AV Velocity Ratio 0.91     AV index (prosthetic) 0.99     EUGENIO by Velocity Ratio 2.66 cm²    MV stenosis pressure 1/2 time 44.29 ms    MV valve area p 1/2 method 4.97 cm2    Triscuspid Valve Regurgitation Peak Gradient 36 mmHg    Sinus 3.25 cm    STJ 2.71 cm    Ascending aorta 3.07 cm    Mean e' 0.11 m/s    ZLVIDS -4.49     ZLVIDD -4.47     TV resting pulmonary artery pressure 36 mmHg    RV TB RVSP 3 mmHg    Est. RA pres 0 mmHg    Narrative      Left Ventricle: The left ventricle is normal in size. Normal wall   thickness. There is concentric remodeling. There is normal systolic   function with a visually estimated ejection fraction of 60 - 65%. There is   normal diastolic function.    Right Ventricle: Normal right ventricular cavity size. Wall thickness   is normal. Systolic function is normal.    Pulmonary Artery: There is borderline elevated pulmonary hypertension.   The estimated pulmonary artery systolic pressure is > 36 mmHg.    IVC/SVC: IVC was not well visualized due to poor acoustic window.         ROBERTA:  No results found for this or any previous visit.    ASSESSMENT/PLAN:           Pre-op  Assessment    I have reviewed the Patient Summary Reports.     I have reviewed the Nursing Notes. I have reviewed the NPO Status.   I have reviewed the Medications.     Review of Systems  Anesthesia Hx:  No problems with previous Anesthesia   History of prior surgery of interest to airway management or planning:          Denies Family Hx of Anesthesia complications.    Denies Personal Hx of Anesthesia complications.                    Social:  Non-Smoker, Alcohol Use       Hematology/Oncology:       -- Denies Anemia:                                  Cardiovascular:     Hypertension    Denies CAD.        Denies CHF.    hyperlipidemia                             Pulmonary:    Denies COPD.  Denies Asthma.                    Hepatic/GI:     GERD             Musculoskeletal:  Denies Arthritis.          Spine Disorders: cervical and lumbar Degenerative disease and Disc disease           Neurological:    Denies CVA.    Denies Seizures.          Peripheral Neuropathy                          Endocrine:  Diabetes, poorly controlled, type 2         Obesity / BMI > 30  Psych:  Denies Psychiatric History. anxiety                 Physical Exam  General: Well nourished, Cooperative, Alert and Oriented    Airway:  Mallampati: III   Mouth Opening: Small, but > 3cm  TM Distance: Normal  Tongue: Normal  Neck ROM: Normal ROM    Dental:  Periodontal disease    Chest/Lungs:  Increased work of breathing on 2L NC  Heart:  Rate: Normal  Rhythm: Regular Rhythm        Anesthesia Plan  Type of Anesthesia, risks & benefits discussed:    Anesthesia Type: Regional  Intra-op Monitoring Plan: Standard ASA Monitors  Post Op Pain Control Plan: multimodal analgesia and IV/PO Opioids PRN  Induction:  IV  Airway Plan: Direct and Video, Post-Induction  Informed Consent: Informed consent signed with the Patient and all parties understand the risks and agree with anesthesia plan.  All questions answered.   ASA Score: 3  Day of Surgery Review of History &  Physical: H&P Update referred to the surgeon/provider.    Ready For Surgery From Anesthesia Perspective.     .

## 2024-09-08 NOTE — PROGRESS NOTES
[facility]  Infectious Disease  Progress Note    Patient Name: Cortes Schroeder  MRN: [id number]  Admission Date: 9/6/2024  Length of Stay: 2 days  Attending Physician: Filemon Miner MD  Primary Care Provider: Andrew Sinclair Jr., MD    Isolation Status: No active isolations  Assessment/Plan:      ID  Surgical site infection  I have reviewed hospital notes from   service and other specialty providers. I have also reviewed CBC, CMP/BMP,  cultures and imaging with my interpretation as documented.      64M with poorly controlled DM (A1C 8.5), recent fall resulting in Left wrist fx and right ankle fracture 07/18/24 s/p ex fix Rt ankle and ORIF left wrist 07/19, and later subsequent ORIF RT ankle on 7/26/24 with Dr. Liz -- now c/b deep surgical site infection involving underlying hardware of Rt ankle. (Of note, L wrist without s/sxs of infection, well healed.) Pt arrived with AMS, temps 99, wbc 18, . Index bld cxs 09/06 +MSSA. Repeat bld cxs 09/07 positive. Repeats 09/08 pending. Recommended TTE.     S/p I&D 09/06. Op report noting purulence overlying hardware, cxs +GBS, +staph aureus (susc pending). No plans for hardware removal, but Ortho plans to return to OR Monday 09/09 for repeat I&D with Plastic surgery assistance for closure.     Pt developed fevers 103F, tachycardia, and increasing leukocytosis post-op day 1 (09/07); cefepime escalated to zosyn, and vanc continued, as we did not have susc of staph back at that time. However, now with MSSA on bld cxs, and GBS with staph aureus on wound cxs, stopped vanc / zosyn, to start cefaozlin 2g Q6h. Fever curve and tachycardia improving. Pt w/o acute complaint or distress at this time.     Of note, AMS improved some since arrival, but still intermittent. May be multi-factorial in nature, but if worsens or persists despite otherwise clinical improvement, would further work-up.     Recommendations / Plan:  Continue Iv-cefazolin 2g Q6h.  Will f/u  repeat bld cxs 09/08 to ensure cleared  Will f/u surgical cxs and adjust abx accordingly  Recommend TTE  Anticipate Iv abx duration of 6wks s/p 09/09, DON 10/21.  Once bld cxs remain cleared for 72hrs, will consider adding rifampin for improved penetration of hardware biofilm, considering retention of involved underling hardware.   Anticipate abx suppression following Iv abx treatment course.  Of note, Pt continuing on Truvada during admission as PrEP since ~2018, per PCP. Continue to monitor kidney and liver function.       -- Discussed with ID staff and primary team   -- ID will continue to follow w/ further recs.        Thank you for your consult. I will follow-up with patient. Please contact us if you have any additional questions.    Andrew Kearns PA-C  Infectious Disease  [facility]    Subjective:     Principal Problem:Surgical wound breakdown    HPI: Cortes Schroeder is a 64 year old with HTN, poorly controlled DM (A1C 8.5), recent history of syncopal episodes, and  right ankle fracture on 7/18/24 s/p ex fix with subsequent ORIF on 7/26/24 with Dr. Liz.  At outpatient Orthopedic follow up surgical wound was healing and sutures removed.  Since then patient  reportedly doing well until about 1 week ago when he noticed some drainage from his surgical incisions.  Over the past week he developed fevers, chills, and night sweats.  Yesterday he became confused/altered and was brought to ED.  In ED was hypotensive, tachycardic, WBC 18, .  Sepsis protocol initiated and started on clindamycin has been doing well postoperatively until around 1 week ago when he started noticing some drainage from his surgical incisions. He started to develop fevers, chills and night sweats over the past week. Yesterday afternoon, patient became altered and was brought to the ED by his roommate. Patient currently lives in [place]. Upon presentation to the ED, patient was tachycardic, hypotensive and afebrile. WBC of  18.68, .3. Sepsis protocol initiated. Patient was started on clindamycin and vancomycin.  Now on vancomycin and cefepime.       Orthopedics consulted.  Noted draining wound over the medial side of the right ankle as well as eschar formation over the lateral side of the ankle.  Right ankle and foot noted to be erythematous and edematous.  Pending surgical I&D today.       Past Medical History:   Diagnosis Date    Anxiety 12/18/2012    Back pain 12/18/2012    Cataract     Chronic pain syndrome 04/24/2016    Diabetes type 2, controlled 02/20/2016    Diabetic retinopathy     DM (diabetes mellitus) 12/18/2012    DM (diabetes mellitus), type 2, uncontrolled 11/16/2013    Gastroesophageal reflux disease without esophagitis 02/20/2016    Hyperlipidemia 12/18/2012    Insomnia 08/07/2014    Neuropathy 11/16/2013       Past Surgical History:   Procedure Laterality Date    APPLICATION, EXTERNAL FIXATION DEVICE, FOR ANKLE FRACTURE Right 7/18/2024    Procedure: APPLICATION, EXTERNAL FIXATION DEVICE, FOR ANKLE FRACTURE;  Surgeon: Moe Liz MD;  Location: [facility] OR [address] [place] [address];  Service: Orthopedics;  Laterality: Right;    BACK SURGERY  2003    Lumbar Spine    CATARACT EXTRACTION      CLOSED REDUCTION, FRACTURE, ANKLE, TRIMALLEOLAR Right 7/18/2024    Procedure: CLOSED REDUCTION, FRACTURE, ANKLE, TRIMALLEOLAR;  Surgeon: Moe Liz MD;  Location: [facility] OR [address] [place] [address];  Service: Orthopedics;  Laterality: Right;    EPIDURAL STEROID INJECTION N/A 1/16/2019    Procedure: Injection, Steroid, Epidural Cervical;  Surgeon: Andrew Sinclair Jr., MD;  Location: [facility];  Service: Pain Management;  Laterality: N/A;  Cervical Epidural Steroid Injection C7-T1    12455    Arrive @ 1240    EPIDURAL STEROID INJECTION N/A 2/20/2019    Procedure: Injection, Steroid, Epidural;  Surgeon: Andrew Sinclair Jr., MD;  Location: [facility] ENDO;  Service: Pain Management;  Laterality: N/A;  Lumbar Epidural Steroid Injection  L4-5    38852    Arrive @ 1215 (requests latest time)    FIXATION OF SYNDESMOSIS OF ANKLE Right 7/26/2024    Procedure: FIXATION, SYNDESMOSIS, ANKLE;  Surgeon: Moe Liz MD;  [facility];  Service: Orthopedics;  Laterality: Right;    INJECTION, SPINE, LUMBOSACRAL, TRANSFORAMINAL APPROACH Bilateral 6/14/2024    Procedure: Bilateral L5 Transforaminal Epidural Steroid Injections;  Surgeon: Andrew Sinclair Jr., MD;  Location: [facility] PAIN MANAGEMENT;  Service: Pain Management;  Laterality: Bilateral;  @1300(given)  ASA last 6/8  Check BG  MD Sign.    OPEN REDUCTION AND INTERNAL FIXATION (ORIF) OF FRACTURE OF DISTAL RADIUS Left 7/19/2024    Procedure: ORIF, FRACTURE, RADIUS, DISTAL - LEFT;  Surgeon: Moe Liz MD;  Location: [facility] OR [address] [place] [address];  Service: Orthopedics;  Laterality: Left;  LEFT, SYNTHES, C ARM    OPEN REDUCTION AND INTERNAL FIXATION (ORIF) OF INJURY OF ANKLE Right 7/26/2024    Procedure: ORIF, ANKLE;  Surgeon: Moe Liz MD;  Location: [facility] OR [address] [place] [address];  Service: Orthopedics;  Laterality: Right;    REMOVAL OF EXTERNAL FIXATION DEVICE Right 7/26/2024    Procedure: REMOVAL, EXTERNAL FIXATION DEVICE;  Surgeon: Moe Liz MD;  Location: [facility] OR [address] [place] [address];  Service: Orthopedics;  Laterality: Right;       Review of patient's allergies indicates:   Allergen Reactions    Invokana [canagliflozin] Anaphylaxis    Percocet [oxycodone-acetaminophen] Nausea Only and Hallucinations    Biaxin [clarithromycin]     Hydrocodone Other (See Comments)     Dizzy/nausea/hallucinations    Sulfa (sulfonamide antibiotics) Nausea Only and Rash       Medications:  Medications Prior to Admission   Medication Sig    acetaminophen (TYLENOL) 325 MG tablet Take 2 tablets (650 mg total) by mouth every 6 (six) hours.    aspirin (ECOTRIN) 81 MG EC tablet Take 1 tablet (81 mg total) by mouth 2 (two) times a day. End date sept 20, 2024    atorvastatin (LIPITOR) 40 MG tablet Take 40 mg by mouth once  daily.    blood-glucose sensor (DEXCOM G6 SENSOR) Omaira Change sensor every 10 days    blood-glucose transmitter (DEXCOM G6 TRANSMITTER) Omaira Change transmitter every 3 months    celecoxib (CELEBREX) 200 MG capsule Take 1 capsule (200 mg total) by mouth once daily.    cyanocobalamin (VITAMIN B-12) 1000 MCG tablet Take 1,000 mcg by mouth once daily.    DULoxetine (CYMBALTA) 60 MG capsule Take 1 capsule (60 mg total) by mouth once daily.    emtricitabine-tenofovir 200-300 mg (TRUVADA) 200-300 mg Tab Take 1 tablet by mouth once daily.    enoxaparin (LOVENOX) 40 mg/0.4 mL Syrg Inject 40 mg into the skin Daily.    fish oil-omega-3 fatty acids 300-1,000 mg capsule Take 1 capsule by mouth 2 (two) times daily.    gabapentin (NEURONTIN) 300 MG capsule Take 2 capsules (600 mg total) by mouth once daily AND 4 capsules (1,200 mg total) every evening.    HUMALOG U-100 INSULIN 100 unit/mL injection 1:20 U PRN high blood sugar and snacks. Correction dose- Enter carb coverage intake upon administration  Target number 120 Carbohydrate coverage #1 1:2 Carbohydrate coverage #1 time [id number] Carbohydrate coverage #2 1:3 Carbohydrate coverage #2 time [id number] Carbohydrate coverage #3 Carbohydrate coverage #3 time Carbohydrate coverage #4 Carbohydrate coverage #4 time Sensitivity #1 1:20 Sensitivity #1 time 9765-4875 Sensitivity #2 Sensitivity #2 time Sensitivity #3 Sensitivity #3 time Sensitivity #4 Sensitivity #4 time Sensitivity #5 Sensitivity #5 time Order Questions Question Answer Comment       50 U SQ continuous  Target number 120 Basal Rate #1 2.3 Basal rate #1 time 5839-8465 Basal Rate #2 1.55 Basal rate #2 time 5180-5718 Basal rate #3 Basal rate #3 time Basal rate #4 Basal rate #4 time Basal rate #5 Basal rate #5 time    losartan (COZAAR) 25 MG tablet Take 1 tablet (25 mg total) by mouth once daily.    magnesium oxide (MAG-OX) 400 mg (241.3 mg magnesium) tablet Take 0.5 tablets (200 mg total) by mouth once daily.    melatonin  "(MELATIN) 3 mg tablet Take 2 tablets (6 mg total) by mouth nightly as needed for Insomnia.    methocarbamoL (ROBAXIN) 500 MG Tab Take 1 tablet (500 mg total) by mouth 4 (four) times daily as needed (pain scale 4-7).    morphine (MSIR) 15 MG tablet Take 1 tablet (15 mg total) by mouth every 4 (four) hours as needed (pain scale 6-10).    MOUNJARO 10 mg/0.5 mL PnIj Inject 10 mg into the skin once a week. HOLD until all surgeries completed and out of rehab    nortriptyline (PAMELOR) 50 MG capsule Take 1 capsule (50 mg total) by mouth nightly.    ondansetron (ZOFRAN-ODT) 4 MG TbDL Take 2 tablets (8 mg total) by mouth every 6 (six) hours as needed (nausea).    polyethylene glycol (GLYCOLAX) 17 gram PwPk Take 17 g by mouth once daily.    rosuvastatin (CRESTOR) 10 MG tablet Take 10 mg by mouth every evening.    senna-docusate 8.6-50 mg (PERICOLACE) 8.6-50 mg per tablet Take 1 tablet by mouth 2 (two) times daily.    vitamin D 1000 units Tab Take 1,000 Units by mouth 2 (two) times daily.     Antibiotics (From admission, onward)      Start     Stop Route Frequency Ordered    09/07/24 2000  piperacillin-tazobactam (ZOSYN) 4.5 g in D5W 100 mL IVPB (MB+)         -- IV Every 8 hours (non-standard times) 09/07/24 1854    09/06/24 0911  vancomycin - pharmacy to dose  (vancomycin IVPB (PEDS and ADULTS))        Placed in "And" Linked Group    -- IV pharmacy to manage frequency 09/06/24 0811          Antifungals (From admission, onward)      None          Antivirals (From admission, onward)          Stop Route Frequency     emtricitabine-tenofovir 200-300 mg         -- Oral Daily             Immunization History   Administered Date(s) Administered    COVID-19, MRNA, LN-S, PF (MODERNA FULL 0.5 ML DOSE) 03/08/2021, 04/05/2021    Influenza 10/25/2021    Influenza - Quadrivalent 11/20/2014, 09/11/2015    Influenza - Quadrivalent - MDCK - PF 10/31/2022    Influenza - Quadrivalent - PF *Preferred* (6 months and older) 10/02/2017, 10/15/2018, " 09/30/2019, 12/11/2020, 10/23/2021, 09/29/2023    Influenza - Trivalent (ADULT) 12/18/2012, 10/29/2013    Influenza Split 12/18/2012, 12/18/2012, 10/29/2013, 10/29/2013    Pneumococcal Conjugate - 20 Valent 11/09/2023    Pneumococcal Polysaccharide - 23 Valent 03/03/2017    Rsv, Bivalent, Rsvpref (Abrysvo) 11/09/2023    Tdap 03/16/2022    Vaccinia, smallpox monkeypox vaccine live, PF 08/15/2022, 09/12/2022    Zoster Recombinant 01/20/2022, 03/25/2022       Family History       Problem Relation (Age of Onset)    Alzheimer's disease Father    Cataracts Father    Heart attack Mother    Hypertension Father, Mother    Migraines Mother    Parkinsonism Father          Social History     Socioeconomic History    Marital status:    Tobacco Use    Smoking status: Never    Smokeless tobacco: Never   Substance and Sexual Activity    Alcohol use: Yes     Alcohol/week: 1.0 standard drink of alcohol     Types: 1 Glasses of wine per week     Comment: occasionally    Drug use: No    Sexual activity: Not Currently     Partners: Male     Birth control/protection: Condom     Comment: 10/2/17      Social Determinants of Health     Financial Resource Strain: Low Risk  (7/24/2024)    Received from [facility]    Overall Financial Resource Strain (CARDIA)     Difficulty of Paying Living Expenses: Not hard at all   Recent Concern: Financial Resource Strain - Medium Risk (7/22/2024)    Overall Financial Resource Strain (CARDIA)     Difficulty of Paying Living Expenses: Somewhat hard   Food Insecurity: No Food Insecurity (7/24/2024)    Received from [facility]    Hunger Vital Sign     Worried About Running Out of Food in the Last Year: Never true     Ran Out of Food in the Last Year: Never true   Recent Concern: Food Insecurity - Food Insecurity Present (7/22/2024)    Hunger Vital Sign     Worried About Running Out of Food in the Last Year: Sometimes true     Ran Out of Food in the Last Year: Sometimes true   Transportation  Needs: No Transportation Needs (8/8/2024)    Received from [facility]     Has lack of transportation kept you from medical appointments or from getting medications?: No     Has lack of transportation kept you from meetings, work, or from getting things needed for daily living?: No   Physical Activity: Insufficiently Active (7/22/2024)    Exercise Vital Sign     Days of Exercise per Week: 2 days     Minutes of Exercise per Session: 20 min   Stress: Stress Concern Present (7/23/2024)    Received from [facility] - Occupational Stress Questionnaire     Feeling of Stress : To some extent   Housing Stability: Low Risk  (2/23/2024)    Housing Stability Vital Sign     Unable to Pay for Housing in the Last Year: No     Number of Places Lived in the Last Year: 1     Unstable Housing in the Last Year: No     Review of Systems   Musculoskeletal:  Positive for arthralgias and myalgias.   Skin:  Positive for color change and wound.   Psychiatric/Behavioral:  Positive for confusion and decreased concentration.    All other systems reviewed and are negative.    Objective:     Vital Signs (Most Recent):  Temp: 98.6 °F (37 °C) (09/07/24 2057)  Pulse: 99 (09/07/24 2057)  Resp: 18 (09/07/24 2057)  BP: (!) 120/58 (09/07/24 2057)  SpO2: 96 % (09/07/24 2057) Vital Signs (24h Range):  Temp:  [97.4 °F (36.3 °C)-101.1 °F (38.4 °C)] 98.6 °F (37 °C)  Pulse:  [] 99  Resp:  [16-22] 18  SpO2:  [90 %-96 %] 96 %  BP: (102-162)/(57-77) 120/58     Weight: 93 kg (205 lb 0.4 oz)  Body mass index is 34.12 kg/m².    Estimated Creatinine Clearance: 66 mL/min (based on SCr of 1.2 mg/dL).     Physical Exam  Vitals and nursing note reviewed.   Constitutional:       General: He is not in acute distress.     Appearance: He is not toxic-appearing or diaphoretic.   HENT:      Nose: No congestion.      Mouth/Throat:      Pharynx: No oropharyngeal exudate.   Eyes:       General: No scleral icterus.     Conjunctiva/sclera: Conjunctivae normal.   Cardiovascular:      Rate and Rhythm: Tachycardia present.      Heart sounds: Normal heart sounds. No murmur heard.  Pulmonary:      Effort: No respiratory distress.      Breath sounds: Normal breath sounds.   Abdominal:      General: Bowel sounds are normal. There is no distension.      Palpations: Abdomen is soft.      Tenderness: There is no abdominal tenderness.   Musculoskeletal:         General: Swelling and tenderness present.      Cervical back: Neck supple. No tenderness.      Right lower leg: Edema present.   Skin:     Findings: Erythema present.   Neurological:      Mental Status: He is disoriented.   Psychiatric:         Mood and Affect: Mood normal.         Behavior: Behavior normal.          Significant Labs: Blood Culture:   Recent Labs   Lab 09/06/24  0235 09/07/24  0910   LABBLOO Gram stain aer bottle: Gram positive cocci in clusters resembling Staph  Gram stain alicia bottle: Gram positive cocci in clusters resembling Staph  Results called to and read back by:Luciana Altamirano RN 09/06/2024  20:55  STAPHYLOCOCCUS AUREUS  For susceptibility see order #[id number]  *  Gram stain aer bottle: Gram positive cocci in clusters resembling Staph  Gram stain alicia bottle: Gram positive cocci in clusters resembling Staph  Results called to and read back by:Luciana Altamirano RN 09/06/2024  20:52  STAPHYLOCOCCUS AUREUS* Gram stain aer bottle: Gram positive cocci in clusters resembling Staph  Results called to and read back by: Roseanne Guevara RN 09/08/2024  14:07  Gram stain aer bottle: Gram positive cocci in clusters resembling Staph  Gram stain alicia bottle: Gram positive cocci in clusters resembling Staph  Results called to and read back by:Luciana Altamirano LPN 09/08/2024  03:59     CBC:   Recent Labs   Lab 09/07/24  0519 09/08/24  0536   WBC 17.10* 21.23*   HGB 10.1* 9.8*   HCT 30.6* 29.3*    416     CMP:   Recent Labs    Lab 09/07/24  0519 09/08/24  0536   * 132*   K 4.0 3.9    104   CO2 18* 20*   * 111*   BUN 23 16   CREATININE 1.2 1.2   CALCIUM 8.5* 8.6*   PROT  --  5.8*   ALBUMIN  --  1.7*   BILITOT  --  0.7   ALKPHOS  --  253*   AST  --  79*   ALT  --  47*   ANIONGAP 10 8     Microbiology Results (last 7 days)       Procedure Component Value Units Date/Time    Blood culture [[id number]] Collected: 09/07/24 0910    Order Status: Completed Specimen: Blood Updated: 09/08/24 1408     Blood Culture, Routine Gram stain aer bottle: Gram positive cocci in clusters resembling Staph      Results called to and read back by: Roseanne Guevara RN 09/08/2024  14:07    Blood culture [[id number]]     Order Status: Sent Specimen: Blood     Blood culture [[id number]]     Order Status: Sent Specimen: Blood     Aerobic culture [[id number]]  (Abnormal) Collected: 09/06/24 1446    Order Status: Completed Specimen: Incision site from Ankle, Right Updated: 09/08/24 0824     Aerobic Bacterial Culture STAPHYLOCOCCUS AUREUS  Many  Susceptibility pending        STREPTOCOCCUS AGALACTIAE (GROUP B)  Many  Beta-hemolytic streptococci are routinely susceptible to   penicillins,cephalosporins and carbapenems.      Narrative:      Right ankle wound - culture    Blood culture x two cultures. Draw prior to antibiotics. [[id number]]  (Abnormal) Collected: 09/06/24 0235    Order Status: Completed Specimen: Blood from Peripheral, Antecubital, Left Updated: 09/08/24 0713     Blood Culture, Routine Gram stain aer bottle: Gram positive cocci in clusters resembling Staph      Gram stain alicia bottle: Gram positive cocci in clusters resembling Staph      Results called to and read back by:Luciana Altamirano RN 09/06/2024      20:55      STAPHYLOCOCCUS AUREUS  For susceptibility see order #[id number]      Narrative:      Aerobic and anaerobic    Blood culture x two cultures. Draw prior to antibiotics. [[id number]]  (Abnormal)  (Susceptibility) Collected:  09/06/24 0235    Order Status: Completed Specimen: Blood from Peripheral, Antecubital, Left Updated: 09/08/24 0711     Blood Culture, Routine Gram stain aer bottle: Gram positive cocci in clusters resembling Staph      Gram stain alicia bottle: Gram positive cocci in clusters resembling Staph      Results called to and read back by:Luciana Altamirano RN 09/06/2024      20:52      STAPHYLOCOCCUS AUREUS    Narrative:      Aerobic and anaerobic    Blood culture [[id number]] Collected: 09/07/24 0910    Order Status: Completed Specimen: Blood Updated: 09/08/24 0400     Blood Culture, Routine Gram stain aer bottle: Gram positive cocci in clusters resembling Staph      Gram stain alicia bottle: Gram positive cocci in clusters resembling Staph      Results called to and read back by:Luciana Altamirano LPN 09/08/2024      03:59    AFB Culture & Smear [[id number]] Collected: 09/06/24 1446    Order Status: Completed Specimen: Incision site from Ankle, Right Updated: 09/07/24 2127     AFB Culture & Smear Culture in progress    Narrative:      Right ankle wound - culture    Culture, Anaerobe [[id number]] Collected: 09/06/24 1446    Order Status: Completed Specimen: Incision site from Ankle, Right Updated: 09/07/24 0731     Anaerobic Culture Culture in progress    Narrative:      Right ankle wound - culture    MRSA/SA Rapid ID by PCR from Blood culture [[id number]]  (Abnormal) Collected: 09/06/24 0235    Order Status: Completed Updated: 09/06/24 2224     Staph aureus ID by PCR Positive     Methicillin Resistant ID by PCR Negative    Narrative:      Aerobic and anaerobic    Gram stain [[id number]] Collected: 09/06/24 1446    Order Status: Completed Specimen: Incision site from Ankle, Right Updated: 09/06/24 1805     Gram Stain Result Rare WBC's      Few Gram positive cocci    Narrative:      Right ankle wound - culture    Fungus culture [[id number]] Collected: 09/06/24 1446    Order Status: Sent Specimen: Incision site from Ankle, Right  Updated: 09/06/24 1501          Wound Culture:   Recent Labs   Lab 09/06/24  1446   LABAERO STAPHYLOCOCCUS AUREUS  Many  Susceptibility pending  *  STREPTOCOCCUS AGALACTIAE (GROUP B)  Many  Beta-hemolytic streptococci are routinely susceptible to   penicillins,cephalosporins and carbapenems.  *     All pertinent labs within the past 24 hours have been reviewed.    Significant Imaging: I have reviewed all pertinent imaging results/findings within the past 24 hours.    I have personally reviewed records / hospital notes from   service and other specialty providers. I have also reviewed CBC, CMP/BMP,  cultures and imaging with my interpretation as documented in my assessment / plan.    Patient is high risk for infectious complications given pt's age, multiple co-morbidities, and case complexity.      Time: 50 minutes   50% of time spent on face-to-face counseling and coordination of care. Counseling included review of test results, diagnosis, and treatment plan with patient and/or family.

## 2024-09-08 NOTE — PROGRESS NOTES
[facility] Medicine  Progress Note    Patient Name: Cortes Schroeder  MRN: [id number]  Patient Class: IP- Inpatient   Admission Date: 9/6/2024  Length of Stay: 2 days  Attending Physician: Filemon Miner MD  Primary Care Provider: Andrew Sinclair Jr., MD        Subjective:     Principal Problem:Surgical wound breakdown        HPI:  Cortes Schroeder is a 64 y.o. M with PMHx of anxiety, T2DM, GERD, HLD, HTN who presented to ED for AMS. He had a fall in July that resulted in R trimalleolar fracture and L distal radius fracture. He had external fixation on 07/18 and then ORIF on 07/26 with Dr. Liz. He was prescribed clinda 300 mg on 08/28 for a ten day course. Patient was doing well when he acutely became altered and not acting like himself a few hours ago. Patient family member drove him here from [facility] for ortho consult. R medial ankle wound dehisced with redness and swelling around it. No CPM fever, cough, N/V/D or seizure.    In ED: T max 99.1. SIRS 2/4 with WBC 18 and . CMP notable for Na 127, Cr 1.7, . Phos 1.9. .3. BNP 73. Trop neg. A1c 8.5. R tib fib XR notes There are 2 abandoned anterior-posteriorly oriented pin tracks in the right mid tibial shaft.  On AP views, each of these pin tracks demonstrates a small faint internal density, possibly representing a sequestrum. Ortho and endocrine consulted. Given IV vanc and IV clindamycin. Given 2.7L IVFs. Admitted to hospital medicine for sepsis 2/2 infected hardware.     Overview/Hospital Course:  Cortes Schroeder is a 65 yo M admitted to hospital medicine for sepsis 2/2 R ankle infection s/p ORIF on 07/26. Started on IV vanc and cefepime. Given IVFs. Brought to OR with ortho on 09/06 for irrigation debridement of RLE. Endocrine following for BG. Was on vanc and zosyn. ID consulted as suspect patient will need long course of abx; now changing to cefazolin. Echo pending. Blood cultures with Staph aureus. 1/2  repeat blood cultures with growth. Wound cultures with Staph aureus and Group B strep. Will follow cultures. Ortho planning to take back to the OR on 09/09.     Interval History: Febrile this AM to 102.9, but now afebrile. Consistently tachycardic with HR in the 110s. 2/2 blood cultures with Staph aureus. 1/2 Repeats positive. Changing abx to IV cefazolin per ID recs. Seen this morning with rigors on exam. Reports 8/10 pain to R ankle. Reports some increased SOB and a nonproductive cough.     Review of Systems   Constitutional:  Positive for activity change and chills. Negative for fever.   Respiratory:  Positive for cough and shortness of breath. Negative for chest tightness.    Cardiovascular:  Negative for chest pain and leg swelling.   Gastrointestinal:  Negative for abdominal pain and nausea.   Musculoskeletal:  Positive for arthralgias.   Skin:  Positive for color change and wound.   Neurological:  Negative for dizziness and weakness.     Objective:     Vital Signs (Most Recent):  Temp: 98.1 °F (36.7 °C) (09/08/24 1159)  Pulse: 101 (09/08/24 1159)  Resp: 18 (09/08/24 1159)  BP: (!) 95/51 (09/08/24 1159)  SpO2: 95 % (09/08/24 1159) Vital Signs (24h Range):  Temp:  [97.6 °F (36.4 °C)-102.9 °F (39.4 °C)] 98.1 °F (36.7 °C)  Pulse:  [] 101  Resp:  [16-22] 18  SpO2:  [91 %-98 %] 95 %  BP: ()/(51-83) 95/51     Weight: 93 kg (205 lb 0.4 oz)  Body mass index is 34.12 kg/m².    Intake/Output Summary (Last 24 hours) at 9/8/2024 1314  Last data filed at 9/7/2024 1800  Gross per 24 hour   Intake --   Output 500 ml   Net -500 ml         Physical Exam  Vitals and nursing note reviewed.   Constitutional:       Appearance: He is well-developed. He is obese. He is ill-appearing.      Comments: Rigors    Eyes:      Pupils: Pupils are equal, round, and reactive to light.   Cardiovascular:      Rate and Rhythm: Regular rhythm. Tachycardia present.   Pulmonary:      Effort: Pulmonary effort is normal.      Breath  sounds: Rales present.      Comments: On 1L NC. Cough with deep inspiration  Abdominal:      Palpations: Abdomen is soft.      Tenderness: There is no abdominal tenderness.   Musculoskeletal:         General: No tenderness.   Skin:     General: Skin is warm and dry.      Comments: C/d/I dressing to R ankle with wound vac    Neurological:      Mental Status: He is alert and oriented to person, place, and time.   Psychiatric:         Behavior: Behavior normal.             Significant Labs: All pertinent labs within the past 24 hours have been reviewed.  CBC:   Recent Labs   Lab 09/07/24 0519 09/08/24  0536   WBC 17.10* 21.23*   HGB 10.1* 9.8*   HCT 30.6* 29.3*    416     CMP:   Recent Labs   Lab 09/07/24 0519 09/08/24 0536   * 132*   K 4.0 3.9    104   CO2 18* 20*   * 111*   BUN 23 16   CREATININE 1.2 1.2   CALCIUM 8.5* 8.6*   PROT  --  5.8*   ALBUMIN  --  1.7*   BILITOT  --  0.7   ALKPHOS  --  253*   AST  --  79*   ALT  --  47*   ANIONGAP 10 8       Significant Imaging: I have reviewed all pertinent imaging results/findings within the past 24 hours.    Assessment/Plan:      * Surgical wound breakdown  Closed trimalleolar fracture of right ankle s/p ORIF in July  63 yo M presenting with AMS and worsening redness, swelling and pain to R ankle.     - continue IV vanc and cefepime  - Ortho consulted:   - taken to OR 09/06 for debridement of surgical wound    - will take back to OR tomorrow. NPO at midnight  - follow wound cultures -- Staph aureus and GBS  - follow blood cultures -- Staph aureus.   - ID following:   - changing to cefazolin today based on cultures   - echo pending     Bacteremia due to Gram-positive bacteria  - 2/2 blood cx with Staph aureus. 1/2 repeats positive. Repeats pending   - Follow repeat blood cx  - ID following: start cefazolin  - echo pending     Sepsis  This patient does have evidence of infective focus  My overall impression is sepsis.  Source: Skin and Soft  Tissue (location ankle)  Antibiotics given-   Antibiotics (72h ago, onward)      Start     Stop Route Frequency Ordered    09/08/24 1200  ceFAZolin 2 g in D5W 50 mL IVPB (MB+)         -- IV Every 6 hours (non-standard times) 09/08/24 1057          Latest lactate reviewed-  Recent Labs   Lab 09/06/24  0536 09/07/24  0910   LACTATE  --  1.6   POCLAC 1.23  --        Organ dysfunction indicated by Acute kidney injury and Encephalopathy    Fluid challenge Actual Body weight- Patient will receive 30ml/kg actual body weight to calculate fluid bolus for treatment of septic shock.     Post- resuscitation assessment No - Post resuscitation assessment not needed     Will Not start Pressors-   Source control achieved by: see above    On pre-exposure prophylaxis for HIV  - continue truvada     Hyponatremia  Hyponatremia is likely due to Dehydration/hypovolemia. The patient's most recent sodium results are listed below.  Recent Labs     09/06/24  0235 09/07/24  0519 09/08/24  0536   * 132* 132*       Plan  - Correct the sodium by 4-6mEq in 24 hours.   - Obtain the following studies: Urine sodium, urine osmolality, serum osmolality.  - Will treat the hyponatremia with IV fluids as follows: NS  - Monitor sodium Daily.   - Patient hyponatremia is improving    Uncontrolled diabetes mellitus with hyperglycemia  Patient's FSGs are not controlled on current hypoglycemics.   Last A1c reviewed-   Lab Results   Component Value Date    LABA1C 10.8 (H) 08/15/2016    HGBA1C 8.5 (H) 09/06/2024     Most recent fingerstick glucose reviewed-   Recent Labs   Lab 09/07/24  1544 09/07/24  2102 09/08/24  0746 09/08/24  1137   POCTGLUCOSE 189* 80 145* 90       Current correctional scale  Low  Maintain anti-hyperglycemic dose as follows-   Antihyperglycemics (From admission, onward)      Start     Stop Route Frequency Ordered    09/08/24 0936  insulin glargine U-100 (Lantus) pen 45 Units         -- SubQ Daily 09/08/24 0937    09/07/24 0715   "insulin aspart U-100 pen 14 Units         -- SubQ 3 times daily with meals 09/06/24 1858 09/06/24 1957  insulin aspart U-100 pen 0-15 Units         -- SubQ Before meals, nightly and at 0200 PRN 09/06/24 1858           - Endocrine consulted:  - At this time, recommend that the patient remain off of his pump since he cannot be controlled in the Auto mode. Patient does not interact with pump frequently and relies on the auto mode algorithm.   - Lantus (Insulin Glargine) 40 units nightly   - Novolog (Insulin Aspart) 14 units TIDWM and prn for BG excursions MDC SSI (150/25)  - hold oral antihyperglycemics during hospitalization   - needs better BG control for optimal wound healing     Closed trimalleolar fracture of right ankle  S/p ORIF 07/2024  Ortho consulted   - see surgical wound breakdown above    DEEPALI (acute kidney injury)  Resolved s/p fluids  DEEPALI is likely due to pre-renal azotemia due to dehydration. Baseline creatinine is  1 . Most recent creatinine and eGFR are listed below.  Recent Labs     09/06/24  0235 09/07/24  0519 09/08/24  0536   CREATININE 1.7* 1.2 1.2   EGFRNORACEVR 44.7* >60.0 >60.0        Plan  - DEEPALI is resolved  - Avoid nephrotoxins and renally dose meds for GFR listed above  - Monitor urine output, serial BMP, and adjust therapy as needed    Uncontrolled type 2 diabetes mellitus with diabetic polyneuropathy, with long-term current use of insulin  Patient's FSGs are uncontrolled due to hyperglycemia on current medication regimen.  Last A1c reviewed-   Lab Results   Component Value Date    LABA1C 10.8 (H) 08/15/2016    HGBA1C 9.9 (H) 07/18/2024     Most recent fingerstick glucose reviewed- No results for input(s): "POCTGLUCOSE" in the last 24 hours.  Current correctional scale  Low  Maintain anti-hyperglycemic dose as follows-   Antihyperglycemics (From admission, onward)      Start     Stop Route Frequency Ordered    09/06/24 0506  insulin aspart U-100 pen 0-5 Units         -- SubQ Every 6 " hours PRN 09/06/24 0406          Hold Oral hypoglycemics while patient is in the hospital.      VTE Risk Mitigation (From admission, onward)           Ordered     enoxaparin injection 40 mg  Daily         09/06/24 1735     IP VTE HIGH RISK PATIENT  Once         09/06/24 1735     Place sequential compression device  Until discontinued         09/06/24 1249                    Discharge Planning   JAYLEEN:      Code Status: Full Code   Is the patient medically ready for discharge?:     Reason for patient still in hospital (select all that apply): Patient trending condition, Treatment, Imaging, Consult recommendations, and Other (specify) back to OR tomorrow                     Jennifer Ochoa PA-C  Department of Hospital Medicine   [facility] - Surgery

## 2024-09-08 NOTE — ASSESSMENT & PLAN NOTE
Resolved s/p fluids  DEEPALI is likely due to pre-renal azotemia due to dehydration. Baseline creatinine is  1 . Most recent creatinine and eGFR are listed below.  Recent Labs     09/06/24  0235 09/07/24  0519 09/08/24  0536   CREATININE 1.7* 1.2 1.2   EGFRNORACEVR 44.7* >60.0 >60.0        Plan  - DEEPALI is resolved  - Avoid nephrotoxins and renally dose meds for GFR listed above  - Monitor urine output, serial BMP, and adjust therapy as needed

## 2024-09-08 NOTE — SUBJECTIVE & OBJECTIVE
Interval History: Febrile this AM to 102.9, but now afebrile. Consistently tachycardic with HR in the 110s. 2/2 blood cultures with Staph aureus. 1/2 Repeats positive. Changing abx to IV cefazolin per ID recs. Seen this morning with rigors on exam. Reports 8/10 pain to R ankle. Reports some increased SOB and a nonproductive cough.     Review of Systems   Constitutional:  Positive for activity change and chills. Negative for fever.   Respiratory:  Positive for cough and shortness of breath. Negative for chest tightness.    Cardiovascular:  Negative for chest pain and leg swelling.   Gastrointestinal:  Negative for abdominal pain and nausea.   Musculoskeletal:  Positive for arthralgias.   Skin:  Positive for color change and wound.   Neurological:  Negative for dizziness and weakness.     Objective:     Vital Signs (Most Recent):  Temp: 98.1 °F (36.7 °C) (09/08/24 1159)  Pulse: 101 (09/08/24 1159)  Resp: 18 (09/08/24 1159)  BP: (!) 95/51 (09/08/24 1159)  SpO2: 95 % (09/08/24 1159) Vital Signs (24h Range):  Temp:  [97.6 °F (36.4 °C)-102.9 °F (39.4 °C)] 98.1 °F (36.7 °C)  Pulse:  [] 101  Resp:  [16-22] 18  SpO2:  [91 %-98 %] 95 %  BP: ()/(51-83) 95/51     Weight: 93 kg (205 lb 0.4 oz)  Body mass index is 34.12 kg/m².    Intake/Output Summary (Last 24 hours) at 9/8/2024 1314  Last data filed at 9/7/2024 1800  Gross per 24 hour   Intake --   Output 500 ml   Net -500 ml         Physical Exam  Vitals and nursing note reviewed.   Constitutional:       Appearance: He is well-developed. He is obese. He is ill-appearing.      Comments: Rigors    Eyes:      Pupils: Pupils are equal, round, and reactive to light.   Cardiovascular:      Rate and Rhythm: Regular rhythm. Tachycardia present.   Pulmonary:      Effort: Pulmonary effort is normal.      Breath sounds: Rales present.      Comments: On 1L NC. Cough with deep inspiration  Abdominal:      Palpations: Abdomen is soft.      Tenderness: There is no abdominal  tenderness.   Musculoskeletal:         General: No tenderness.   Skin:     General: Skin is warm and dry.      Comments: C/d/I dressing to R ankle with wound vac    Neurological:      Mental Status: He is alert and oriented to person, place, and time.   Psychiatric:         Behavior: Behavior normal.             Significant Labs: All pertinent labs within the past 24 hours have been reviewed.  CBC:   Recent Labs   Lab 09/07/24  0519 09/08/24  0536   WBC 17.10* 21.23*   HGB 10.1* 9.8*   HCT 30.6* 29.3*    416     CMP:   Recent Labs   Lab 09/07/24 0519 09/08/24  0536   * 132*   K 4.0 3.9    104   CO2 18* 20*   * 111*   BUN 23 16   CREATININE 1.2 1.2   CALCIUM 8.5* 8.6*   PROT  --  5.8*   ALBUMIN  --  1.7*   BILITOT  --  0.7   ALKPHOS  --  253*   AST  --  79*   ALT  --  47*   ANIONGAP 10 8       Significant Imaging: I have reviewed all pertinent imaging results/findings within the past 24 hours.

## 2024-09-08 NOTE — ASSESSMENT & PLAN NOTE
Cortes Schroeder is a 64 y.o. male with PMH of DM, HTN  and right trimalleolar ankle fracture status post ex fix on 07/18 with subsequent definitive fixation on 07/26 presenting with right ankle wound and concern for sepsis.  Patient brought to ED for SOB by a roommate as you noticed mental status change yesterday afternoon.  Upon presentation, patient afebrile, tachycardic and hypotensive.  Sepsis protocol initiated.  Patient received clindamycin and vancomycin in the ED. WBC 18.68, .3.  On exam patient has draining wound over the medial side of the right ankle as well as eschar formation over the lateral side of the ankle.  Right ankle and foot erythematous and edematous consistent with postoperative infection. He is s/p I&D of R ankle 09/06    - Patient admitted to hospital medicine  - ABX : Zosyn  - Multimodal pain management  -DVT prophy: lovenox  - F/u culture. NGTD

## 2024-09-08 NOTE — SUBJECTIVE & OBJECTIVE
Past Medical History:   Diagnosis Date    Anxiety 12/18/2012    Back pain 12/18/2012    Cataract     Chronic pain syndrome 04/24/2016    Diabetes type 2, controlled 02/20/2016    Diabetic retinopathy     DM (diabetes mellitus) 12/18/2012    DM (diabetes mellitus), type 2, uncontrolled 11/16/2013    Gastroesophageal reflux disease without esophagitis 02/20/2016    Hyperlipidemia 12/18/2012    Insomnia 08/07/2014    Neuropathy 11/16/2013       Past Surgical History:   Procedure Laterality Date    APPLICATION, EXTERNAL FIXATION DEVICE, FOR ANKLE FRACTURE Right 7/18/2024    Procedure: APPLICATION, EXTERNAL FIXATION DEVICE, FOR ANKLE FRACTURE;  Surgeon: Moe Liz MD;  Location: [facility] OR [address] [place] [address];  Service: Orthopedics;  Laterality: Right;    BACK SURGERY  2003    Lumbar Spine    CATARACT EXTRACTION      CLOSED REDUCTION, FRACTURE, ANKLE, TRIMALLEOLAR Right 7/18/2024    Procedure: CLOSED REDUCTION, FRACTURE, ANKLE, TRIMALLEOLAR;  Surgeon: Moe Liz MD;  Location: [facility] OR [address] [place] [address];  Service: Orthopedics;  Laterality: Right;    EPIDURAL STEROID INJECTION N/A 1/16/2019    Procedure: Injection, Steroid, Epidural Cervical;  Surgeon: Andrew Sinclair Jr., MD;  Location: [facility];  Service: Pain Management;  Laterality: N/A;  Cervical Epidural Steroid Injection C7-T1    97938    Arrive @ 1240    EPIDURAL STEROID INJECTION N/A 2/20/2019    Procedure: Injection, Steroid, Epidural;  Surgeon: Andrew Sinclair Jr., MD;  Location: [facility] ENDO;  Service: Pain Management;  Laterality: N/A;  Lumbar Epidural Steroid Injection L4-5    [id number]    Arrive @ 1215 (requests latest time)    FIXATION OF SYNDESMOSIS OF ANKLE Right 7/26/2024    Procedure: FIXATION, SYNDESMOSIS, ANKLE;  Surgeon: Moe Liz MD;  Location: [facility] OR [address] [place] [address];  Service: Orthopedics;  Laterality: Right;    INJECTION, SPINE, LUMBOSACRAL, TRANSFORAMINAL APPROACH Bilateral 6/14/2024    Procedure: Bilateral L5 Transforaminal Epidural Steroid  Injections;  Surgeon: Andrew Sinclair Jr., MD;  Location: [facility] PAIN MANAGEMENT;  Service: Pain Management;  Laterality: Bilateral;  @1300(given)  ASA last 6/8  Check BG  MD Sign.    OPEN REDUCTION AND INTERNAL FIXATION (ORIF) OF FRACTURE OF DISTAL RADIUS Left 7/19/2024    Procedure: ORIF, FRACTURE, RADIUS, DISTAL - LEFT;  Surgeon: Moe Liz MD;  [facility];  Service: Orthopedics;  Laterality: Left;  LEFT, SYNTHES, C ARM    OPEN REDUCTION AND INTERNAL FIXATION (ORIF) OF INJURY OF ANKLE Right 7/26/2024    Procedure: ORIF, ANKLE;  Surgeon: Moe Liz MD;  Location: [facility] OR [facility] FLR;  Service: Orthopedics;  Laterality: Right;    REMOVAL OF EXTERNAL FIXATION DEVICE Right 7/26/2024    Procedure: REMOVAL, EXTERNAL FIXATION DEVICE;  Surgeon: Moe Liz MD;  [facility];  Service: Orthopedics;  Laterality: Right;       Review of patient's allergies indicates:   Allergen Reactions    Invokana [canagliflozin] Anaphylaxis    Percocet [oxycodone-acetaminophen] Nausea Only and Hallucinations    Biaxin [clarithromycin]     Hydrocodone Other (See Comments)     Dizzy/nausea/hallucinations    Sulfa (sulfonamide antibiotics) Nausea Only and Rash       Medications:  Medications Prior to Admission   Medication Sig    acetaminophen (TYLENOL) 325 MG tablet Take 2 tablets (650 mg total) by mouth every 6 (six) hours.    aspirin (ECOTRIN) 81 MG EC tablet Take 1 tablet (81 mg total) by mouth 2 (two) times a day. End date sept 20, 2024    atorvastatin (LIPITOR) 40 MG tablet Take 40 mg by mouth once daily.    blood-glucose sensor (DEXCOM G6 SENSOR) Omaira Change sensor every 10 days    blood-glucose transmitter (DEXCOM G6 TRANSMITTER) Omaira Change transmitter every 3 months    celecoxib (CELEBREX) 200 MG capsule Take 1 capsule (200 mg total) by mouth once daily.    cyanocobalamin (VITAMIN B-12) 1000 MCG tablet Take 1,000 mcg by mouth once daily.    DULoxetine (CYMBALTA) 60 MG capsule Take 1  capsule (60 mg total) by mouth once daily.    emtricitabine-tenofovir 200-300 mg (TRUVADA) 200-300 mg Tab Take 1 tablet by mouth once daily.    enoxaparin (LOVENOX) 40 mg/0.4 mL Syrg Inject 40 mg into the skin Daily.    fish oil-omega-3 fatty acids 300-1,000 mg capsule Take 1 capsule by mouth 2 (two) times daily.    gabapentin (NEURONTIN) 300 MG capsule Take 2 capsules (600 mg total) by mouth once daily AND 4 capsules (1,200 mg total) every evening.    HUMALOG U-100 INSULIN 100 unit/mL injection 1:20 U PRN high blood sugar and snacks. Correction dose- Enter carb coverage intake upon administration  Target number 120 Carbohydrate coverage #1 1:2 Carbohydrate coverage #1 time [id number] Carbohydrate coverage #2 1:3 Carbohydrate coverage #2 time [id number] Carbohydrate coverage #3 Carbohydrate coverage #3 time Carbohydrate coverage #4 Carbohydrate coverage #4 time Sensitivity #1 1:20 Sensitivity #1 time 3391-9337 Sensitivity #2 Sensitivity #2 time Sensitivity #3 Sensitivity #3 time Sensitivity #4 Sensitivity #4 time Sensitivity #5 Sensitivity #5 time Order Questions Question Answer Comment       50 U SQ continuous  Target number 120 Basal Rate #1 2.3 Basal rate #1 time 4499-1033 Basal Rate #2 1.55 Basal rate #2 time 7751-2397 Basal rate #3 Basal rate #3 time Basal rate #4 Basal rate #4 time Basal rate #5 Basal rate #5 time    losartan (COZAAR) 25 MG tablet Take 1 tablet (25 mg total) by mouth once daily.    magnesium oxide (MAG-OX) 400 mg (241.3 mg magnesium) tablet Take 0.5 tablets (200 mg total) by mouth once daily.    melatonin (MELATIN) 3 mg tablet Take 2 tablets (6 mg total) by mouth nightly as needed for Insomnia.    methocarbamoL (ROBAXIN) 500 MG Tab Take 1 tablet (500 mg total) by mouth 4 (four) times daily as needed (pain scale 4-7).    morphine (MSIR) 15 MG tablet Take 1 tablet (15 mg total) by mouth every 4 (four) hours as needed (pain scale 6-10).    MOUNJARO 10 mg/0.5 mL PnIj Inject 10 mg into the skin once  "a week. HOLD until all surgeries completed and out of rehab    nortriptyline (PAMELOR) 50 MG capsule Take 1 capsule (50 mg total) by mouth nightly.    ondansetron (ZOFRAN-ODT) 4 MG TbDL Take 2 tablets (8 mg total) by mouth every 6 (six) hours as needed (nausea).    polyethylene glycol (GLYCOLAX) 17 gram PwPk Take 17 g by mouth once daily.    rosuvastatin (CRESTOR) 10 MG tablet Take 10 mg by mouth every evening.    senna-docusate 8.6-50 mg (PERICOLACE) 8.6-50 mg per tablet Take 1 tablet by mouth 2 (two) times daily.    vitamin D 1000 units Tab Take 1,000 Units by mouth 2 (two) times daily.     Antibiotics (From admission, onward)      Start     Stop Route Frequency Ordered    09/07/24 2000  piperacillin-tazobactam (ZOSYN) 4.5 g in D5W 100 mL IVPB (MB+)         -- IV Every 8 hours (non-standard times) 09/07/24 1854    09/06/24 0911  vancomycin - pharmacy to dose  (vancomycin IVPB (PEDS and ADULTS))        Placed in "And" Linked Group    -- IV pharmacy to manage frequency 09/06/24 0811          Antifungals (From admission, onward)      None          Antivirals (From admission, onward)          Stop Route Frequency     emtricitabine-tenofovir 200-300 mg         -- Oral Daily             Immunization History   Administered Date(s) Administered    COVID-19, MRNA, LN-S, PF (MODERNA FULL 0.5 ML DOSE) 03/08/2021, 04/05/2021    Influenza 10/25/2021    Influenza - Quadrivalent 11/20/2014, 09/11/2015    Influenza - Quadrivalent - MDCK - PF 10/31/2022    Influenza - Quadrivalent - PF *Preferred* (6 months and older) 10/02/2017, 10/15/2018, 09/30/2019, 12/11/2020, 10/23/2021, 09/29/2023    Influenza - Trivalent (ADULT) 12/18/2012, 10/29/2013    Influenza Split 12/18/2012, 12/18/2012, 10/29/2013, 10/29/2013    Pneumococcal Conjugate - 20 Valent 11/09/2023    Pneumococcal Polysaccharide - 23 Valent 03/03/2017    Rsv, Bivalent, Rsvpref (Abrysvo) 11/09/2023    Tdap 03/16/2022    Vaccinia, smallpox monkeypox vaccine live, PF " 08/15/2022, 09/12/2022    Zoster Recombinant 01/20/2022, 03/25/2022       Family History       Problem Relation (Age of Onset)    Alzheimer's disease Father    Cataracts Father    Heart attack Mother    Hypertension Father, Mother    Migraines Mother    Parkinsonism Father          Social History     Socioeconomic History    Marital status:    Tobacco Use    Smoking status: Never    Smokeless tobacco: Never   Substance and Sexual Activity    Alcohol use: Yes     Alcohol/week: 1.0 standard drink of alcohol     Types: 1 Glasses of wine per week     Comment: occasionally    Drug use: No    Sexual activity: Not Currently     Partners: Male     Birth control/protection: Condom     Comment: 10/2/17      Social Determinants of Health     Financial Resource Strain: Low Risk  (7/24/2024)    Received from [facility]    Overall Financial Resource Strain (CARDIA)     Difficulty of Paying Living Expenses: Not hard at all   Recent Concern: Financial Resource Strain - Medium Risk (7/22/2024)    Overall Financial Resource Strain (CARDIA)     Difficulty of Paying Living Expenses: Somewhat hard   Food Insecurity: No Food Insecurity (7/24/2024)    Received from [facility]    Hunger Vital Sign     Worried About Running Out of Food in the Last Year: Never true     Ran Out of Food in the Last Year: Never true   Recent Concern: Food Insecurity - Food Insecurity Present (7/22/2024)    Hunger Vital Sign     Worried About Running Out of Food in the Last Year: Sometimes true     Ran Out of Food in the Last Year: Sometimes true   Transportation Needs: No Transportation Needs (8/8/2024)    Received from [facility]    [facility] Transportation Source     Has lack of transportation kept you from medical appointments or from getting medications?: No     Has lack of transportation kept you from meetings, work, or from getting things needed for daily living?: No   Physical Activity: Insufficiently Active (7/22/2024)    Exercise  Vital Sign     Days of Exercise per Week: 2 days     Minutes of Exercise per Session: 20 min   Stress: Stress Concern Present (7/23/2024)    Received from [facility] - Occupational Stress Questionnaire     Feeling of Stress : To some extent   Housing Stability: Low Risk  (2/23/2024)    Housing Stability Vital Sign     Unable to Pay for Housing in the Last Year: No     Number of Places Lived in the Last Year: 1     Unstable Housing in the Last Year: No     Review of Systems   Musculoskeletal:  Positive for arthralgias and myalgias.   Skin:  Positive for color change and wound.   Psychiatric/Behavioral:  Positive for confusion and decreased concentration.    All other systems reviewed and are negative.    Objective:     Vital Signs (Most Recent):  Temp: 98.6 °F (37 °C) (09/07/24 2057)  Pulse: 99 (09/07/24 2057)  Resp: 18 (09/07/24 2057)  BP: (!) 120/58 (09/07/24 2057)  SpO2: 96 % (09/07/24 2057) Vital Signs (24h Range):  Temp:  [97.4 °F (36.3 °C)-101.1 °F (38.4 °C)] 98.6 °F (37 °C)  Pulse:  [] 99  Resp:  [16-22] 18  SpO2:  [90 %-96 %] 96 %  BP: (102-162)/(57-77) 120/58     Weight: 93 kg (205 lb 0.4 oz)  Body mass index is 34.12 kg/m².    Estimated Creatinine Clearance: 66 mL/min (based on SCr of 1.2 mg/dL).     Physical Exam  Vitals and nursing note reviewed.   Constitutional:       General: He is not in acute distress.     Appearance: He is not toxic-appearing or diaphoretic.   HENT:      Nose: No congestion.      Mouth/Throat:      Pharynx: No oropharyngeal exudate.   Eyes:      General: No scleral icterus.     Conjunctiva/sclera: Conjunctivae normal.   Cardiovascular:      Rate and Rhythm: Tachycardia present.      Heart sounds: Normal heart sounds. No murmur heard.  Pulmonary:      Effort: No respiratory distress.      Breath sounds: Normal breath sounds.   Abdominal:      General: Bowel sounds are normal. There is no distension.      Palpations: Abdomen is  soft.      Tenderness: There is no abdominal tenderness.   Musculoskeletal:         General: Swelling and tenderness present.      Cervical back: Neck supple. No tenderness.      Right lower leg: Edema present.   Skin:     Findings: Erythema present.   Neurological:      Mental Status: He is disoriented.   Psychiatric:         Mood and Affect: Mood normal.         Behavior: Behavior normal.          Significant Labs: Blood Culture:   Recent Labs   Lab 09/06/24  0235 09/07/24  0910   LABBLOO Gram stain aer bottle: Gram positive cocci in clusters resembling Staph  Gram stain alicia bottle: Gram positive cocci in clusters resembling Staph  Results called to and read back by:Luciana Altamirano RN 09/06/2024  20:55  STAPHYLOCOCCUS AUREUS  For susceptibility see order #[id number]  *  Gram stain aer bottle: Gram positive cocci in clusters resembling Staph  Gram stain alicia bottle: Gram positive cocci in clusters resembling Staph  Results called to and read back by:Luciana Altamirano RN 09/06/2024  20:52  STAPHYLOCOCCUS AUREUS* Gram stain aer bottle: Gram positive cocci in clusters resembling Staph  Results called to and read back by: Roseanne Guevara RN 09/08/2024  14:07  Gram stain aer bottle: Gram positive cocci in clusters resembling Staph  Gram stain alicia bottle: Gram positive cocci in clusters resembling Staph  Results called to and read back by:Luciana Altamirano LPN 09/08/2024  03:59     CBC:   Recent Labs   Lab 09/07/24  0519 09/08/24  0536   WBC 17.10* 21.23*   HGB 10.1* 9.8*   HCT 30.6* 29.3*    416     CMP:   Recent Labs   Lab 09/07/24  0519 09/08/24  0536   * 132*   K 4.0 3.9    104   CO2 18* 20*   * 111*   BUN 23 16   CREATININE 1.2 1.2   CALCIUM 8.5* 8.6*   PROT  --  5.8*   ALBUMIN  --  1.7*   BILITOT  --  0.7   ALKPHOS  --  253*   AST  --  79*   ALT  --  47*   ANIONGAP 10 8     Microbiology Results (last 7 days)       Procedure Component Value Units Date/Time    Blood culture  [[id number]] Collected: 09/07/24 0910    Order Status: Completed Specimen: Blood Updated: 09/08/24 1408     Blood Culture, Routine Gram stain aer bottle: Gram positive cocci in clusters resembling Staph      Results called to and read back by: Roseanne Guevara RN 09/08/2024  14:07    Blood culture [[id number]]     Order Status: Sent Specimen: Blood     Blood culture [[id number]]     Order Status: Sent Specimen: Blood     Aerobic culture [[id number]]  (Abnormal) Collected: 09/06/24 1446    Order Status: Completed Specimen: Incision site from Ankle, Right Updated: 09/08/24 0824     Aerobic Bacterial Culture STAPHYLOCOCCUS AUREUS  Many  Susceptibility pending        STREPTOCOCCUS AGALACTIAE (GROUP B)  Many  Beta-hemolytic streptococci are routinely susceptible to   penicillins,cephalosporins and carbapenems.      Narrative:      Right ankle wound - culture    Blood culture x two cultures. Draw prior to antibiotics. [[id number]]  (Abnormal) Collected: 09/06/24 0235    Order Status: Completed Specimen: Blood from Peripheral, Antecubital, Left Updated: 09/08/24 0713     Blood Culture, Routine Gram stain aer bottle: Gram positive cocci in clusters resembling Staph      Gram stain alicia bottle: Gram positive cocci in clusters resembling Staph      Results called to and read back by:Luciana Altamirano RN 09/06/2024      20:55      STAPHYLOCOCCUS AUREUS  For susceptibility see order #[id number]      Narrative:      Aerobic and anaerobic    Blood culture x two cultures. Draw prior to antibiotics. [[id number]]  (Abnormal)  (Susceptibility) Collected: 09/06/24 0235    Order Status: Completed Specimen: Blood from Peripheral, Antecubital, Left Updated: 09/08/24 0711     Blood Culture, Routine Gram stain aer bottle: Gram positive cocci in clusters resembling Staph      Gram stain alicia bottle: Gram positive cocci in clusters resembling Staph      Results called to and read back by:Luciana Altamirano RN 09/06/2024      20:52       STAPHYLOCOCCUS AUREUS    Narrative:      Aerobic and anaerobic    Blood culture [[id number]] Collected: 09/07/24 0910    Order Status: Completed Specimen: Blood Updated: 09/08/24 0400     Blood Culture, Routine Gram stain aer bottle: Gram positive cocci in clusters resembling Staph      Gram stain alicia bottle: Gram positive cocci in clusters resembling Staph      Results called to and read back by:Luciana Altamirano LPN 09/08/2024      03:59    AFB Culture & Smear [[id number]] Collected: 09/06/24 1446    Order Status: Completed Specimen: Incision site from Ankle, Right Updated: 09/07/24 2127     AFB Culture & Smear Culture in progress    Narrative:      Right ankle wound - culture    Culture, Anaerobe [[id number]] Collected: 09/06/24 1446    Order Status: Completed Specimen: Incision site from Ankle, Right Updated: 09/07/24 0731     Anaerobic Culture Culture in progress    Narrative:      Right ankle wound - culture    MRSA/SA Rapid ID by PCR from Blood culture [[id number]]  (Abnormal) Collected: 09/06/24 0235    Order Status: Completed Updated: 09/06/24 2224     Staph aureus ID by PCR Positive     Methicillin Resistant ID by PCR Negative    Narrative:      Aerobic and anaerobic    Gram stain [[id number]] Collected: 09/06/24 1446    Order Status: Completed Specimen: Incision site from Ankle, Right Updated: 09/06/24 1805     Gram Stain Result Rare WBC's      Few Gram positive cocci    Narrative:      Right ankle wound - culture    Fungus culture [[id number]] Collected: 09/06/24 1446    Order Status: Sent Specimen: Incision site from Ankle, Right Updated: 09/06/24 1501          Wound Culture:   Recent Labs   Lab 09/06/24  1446   LABAERO STAPHYLOCOCCUS AUREUS  Many  Susceptibility pending  *  STREPTOCOCCUS AGALACTIAE (GROUP B)  Many  Beta-hemolytic streptococci are routinely susceptible to   penicillins,cephalosporins and carbapenems.  *     All pertinent labs within the past 24 hours have been reviewed.    Significant  Imaging: I have reviewed all pertinent imaging results/findings within the past 24 hours.    I have personally reviewed records / hospital notes from   service and other specialty providers. I have also reviewed CBC, CMP/BMP,  cultures and imaging with my interpretation as documented in my assessment / plan.    Patient is high risk for infectious complications given pt's age, multiple co-morbidities, and case complexity.      Time: 50 minutes   50% of time spent on face-to-face counseling and coordination of care. Counseling included review of test results, diagnosis, and treatment plan with patient and/or family.

## 2024-09-08 NOTE — PLAN OF CARE
Problem: Adult Inpatient Plan of Care  Goal: Plan of Care Review  Outcome: Progressing  Goal: Patient-Specific Goal (Individualized)  Outcome: Progressing  Goal: Absence of Hospital-Acquired Illness or Injury  Outcome: Progressing  Goal: Optimal Comfort and Wellbeing  Outcome: Progressing  Goal: Readiness for Transition of Care  Outcome: Progressing     Problem: Fall Injury Risk  Goal: Absence of Fall and Fall-Related Injury  Outcome: Progressing     Problem: Sepsis/Septic Shock  Goal: Optimal Coping  Outcome: Progressing  Goal: Absence of Bleeding  Outcome: Progressing  Goal: Blood Glucose Level Within Targeted Range  Outcome: Progressing  Goal: Absence of Infection Signs and Symptoms  Outcome: Progressing  Goal: Optimal Nutrition Intake  Outcome: Progressing     Problem: Acute Kidney Injury/Impairment  Goal: Fluid and Electrolyte Balance  Outcome: Progressing  Goal: Improved Oral Intake  Outcome: Progressing  Goal: Effective Renal Function  Outcome: Progressing     Problem: Wound  Goal: Optimal Coping  Outcome: Progressing  Goal: Optimal Functional Ability  Outcome: Progressing  Goal: Absence of Infection Signs and Symptoms  Outcome: Progressing  Goal: Improved Oral Intake  Outcome: Progressing  Goal: Optimal Pain Control and Function  Outcome: Progressing  Goal: Skin Health and Integrity  Outcome: Progressing  Goal: Optimal Wound Healing  Outcome: Progressing     Problem: Diabetes Comorbidity  Goal: Blood Glucose Level Within Targeted Range  Outcome: Progressing     Problem: Skin Injury Risk Increased  Goal: Skin Health and Integrity  Outcome: Progressing     Addressed patients pain, wound Vac., position, need for potty, and possessions in reach. Patient remains free of falls, and verbalizes understanding in fall prevention and safety. Safety measures remain in place. Reinforced fall prevention and safety education. Call light and personal belongings within reach, bed in lowest position with wheels locked, side  rails up x2, Instructed to call with any needs or complaints, verbalized understanding. Continue current plan of care.

## 2024-09-08 NOTE — PLAN OF CARE
[facility] - [facility]  Initial Discharge Assessment       Primary Care Provider: Andrew Sinclair Jr., MD    Admission Diagnosis: Soft tissue infection [L08.9]  Surgical site infection [T81.49XA]    Admission Date: 9/6/2024  Expected Discharge Date:     Transition of Care Barriers: (P) None    Payor: [place] / Plan: [facility] CHOICE PLUS / Product Type: Commercial /     Extended Emergency Contact Information  Primary Emergency Contact: Juan Roberts   [place]  Mobile Phone: [phone]  Relation: Roommate    Discharge Plan A: (P) Home Health  Discharge Plan B: (P) Home with family      [facility] [employer] - [address] [facility] [address] [facility]  [address] [facility] [place] [address]  Phone: [phone] Fax: [phone]    [id number] - [place], [place] - [address]  [address]  [place] [address]  Phone: [phone] Fax: [phone]      Initial Assessment (most recent)       Adult Discharge Assessment - 09/08/24 1806          Discharge Assessment    Assessment Type Discharge Planning Assessment (P)      Confirmed/corrected address, phone number and insurance Yes (P)      Confirmed Demographics Correct on Facesheet (P)      Source of Information patient (P)      Communicated JAYLEEN with patient/caregiver Date not available/Unable to determine (P)      Reason For Admission Surgical Wound Breakdown (P)      People in Home friend(s) (P)    Roommate    Do you expect to return to your current living situation? Yes (P)      Do you have help at home or someone to help you manage your care at home? Yes (P)      Who are your caregiver(s) and their phone number(s)? Roommate: Juan Alejandra (c) [phone] (P)      Prior to hospitilization cognitive status: Alert/Oriented (P)      Current cognitive status: Alert/Oriented (P)      Walking or Climbing Stairs Difficulty yes (P)      Walking or Climbing Stairs ambulation difficulty,  requires equipment (P)      Dressing/Bathing Difficulty yes (P)      Do you have any problems with: Needs other help (P)      Specify other help Roommate (P)      Home Accessibility wheelchair accessible (P)      Equipment Currently Used at Home wheelchair (P)      Readmission within 30 days? No (P)      Patient currently being followed by outpatient case management? No (P)      Do you currently have service(s) that help you manage your care at home? No (P)      Do you take prescription medications? Yes (P)      Do you have prescription coverage? Yes (P)      Coverage [employer] (P)      Do you have any problems affording any of your prescribed medications? No (P)      Is the patient taking medications as prescribed? yes (P)      Who is going to help you get home at discharge? Roommate (P)      How do you get to doctors appointments? car, drives self (P)      Are you on dialysis? No (P)      Do you take coumadin? No (P)      Discharge Plan A Home Health (P)      Discharge Plan B Home with family (P)      DME Needed Upon Discharge  none (P)      Discharge Plan discussed with: Patient (P)      Transition of Care Barriers None (P)         Physical Activity    On average, how many days per week do you engage in moderate to strenuous exercise (like a brisk walk)? 0 days (P)      On average, how many minutes do you engage in exercise at this level? 0 min (P)         Financial Resource Strain    How hard is it for you to pay for the very basics like food, housing, medical care, and heating? Hard (P)         Housing Stability    In the last 12 months, was there a time when you were not able to pay the mortgage or rent on time? No (P)      At any time in the past 12 months, were you homeless or living in a shelter (including now)? No (P)         Transportation Needs    Has the lack of transportation kept you from medical appointments, meetings, work or from getting things needed for daily living? No (P)         Food  Insecurity    Within the past 12 months, you worried that your food would run out before you got the money to buy more. Never true (P)      Within the past 12 months, the food you bought just didn't last and you didn't have money to get more. Never true (P)         Stress    Do you feel stress - tense, restless, nervous, or anxious, or unable to sleep at night because your mind is troubled all the time - these days? To some extent (P)         Social Isolation    How often do you feel lonely or isolated from those around you?  Rarely (P)         Alcohol Use    Q1: How often do you have a drink containing alcohol? Never (P)      Q2: How many drinks containing alcohol do you have on a typical day when you are drinking? Patient does not drink (P)      Q3: How often do you have six or more drinks on one occasion? Never (P)         Utilities    In the past 12 months has the electric, gas, oil, or water company threatened to shut off services in your home? No (P)         Health Literacy    How often do you need to have someone help you when you read instructions, pamphlets, or other written material from your doctor or pharmacy? Never (P)         OTHER    Name(s) of People in Home Olga Juan (P)                    ELIAS met with pt at bedside to complete Initial Discharge Planning Assessment. Pt. and Juan saha live in their home in [place], [place]. DME: wheelchair. Home Health Services with [facility] for PT/OT. No dialysis or Coumadin. Pt. drives, but roommate will provide transportation upon discharge. Pt. anticipating surgery in the morning.     Discharge Plan A and Plan B have been determined by review of patient's clinical status, future medical and therapeutic needs, and coverage/benefits for post-acute care in coordination with multidisciplinary team members.     Monique Tripp LMSW

## 2024-09-08 NOTE — PROGRESS NOTES
[facility] - [facility]  Orthopedics  Progress Note    Patient Name: Cortes Schroeder  MRN: [id number]  Admission Date: 9/6/2024  Hospital Length of Stay: 2 days  Attending Provider: Filemon Miner MD  Primary Care Provider: Andrew Sinclair Jr., MD  Follow-up For: Procedure(s) (LRB):  APPLICATION, WOUND VAC (Right)  IRRIGATION AND DEBRIDEMENT (Right)    Post-Operative Day: 2 Days Post-Op  Subjective:     Principal Problem:Surgical wound breakdown    Principal Orthopedic Problem: s/p I&D and wound vac placement on 09/06    Interval History: Pt seen and examined at bedside. tachycardic.  NAEON. Reports no new symptoms. Denies fevers or chill.      Review of patient's allergies indicates:   Allergen Reactions    Invokana [canagliflozin] Anaphylaxis    Percocet [oxycodone-acetaminophen] Nausea Only and Hallucinations    Biaxin [clarithromycin]     Hydrocodone Other (See Comments)     Dizzy/nausea/hallucinations    Sulfa (sulfonamide antibiotics) Nausea Only and Rash       Current Facility-Administered Medications   Medication    acetaminophen tablet 1,000 mg    atorvastatin tablet 40 mg    dextrose 10% bolus 125 mL 125 mL    dextrose 10% bolus 125 mL 125 mL    dextrose 10% bolus 250 mL 250 mL    dextrose 10% bolus 250 mL 250 mL    emtricitabine-tenofovir 200-300 mg per tablet 1 tablet    enoxaparin injection 40 mg    gabapentin capsule 600 mg    And    gabapentin capsule 1,200 mg    glucagon (human recombinant) injection 1 mg    glucose chewable tablet 16 g    glucose chewable tablet 24 g    insulin aspart U-100 pen 0-15 Units    insulin aspart U-100 pen 14 Units    insulin glargine U-100 (Lantus) pen 40 Units    morphine injection 2 mg    morphine injection 4 mg    nortriptyline capsule 50 mg    PHENYLephrine injection 100 mcg    PHENYLephrine injection 100 mcg    piperacillin-tazobactam (ZOSYN) 4.5 g in D5W 100 mL IVPB (MB+)    vancomycin - pharmacy to dose     Objective:     Vital Signs (Most Recent):  Temp: 99.7 °F  "(37.6 °C) (09/08/24 0438)  Pulse: (!) 116 (09/08/24 0438)  Resp: 18 (09/08/24 0438)  BP: (!) 171/83 (09/08/24 0438)  SpO2: 96 % (09/08/24 0438) Vital Signs (24h Range):  Temp:  [97.6 °F (36.4 °C)-101.1 °F (38.4 °C)] 99.7 °F (37.6 °C)  Pulse:  [] 116  Resp:  [16-22] 18  SpO2:  [90 %-97 %] 96 %  BP: (102-171)/(57-83) 171/83     Weight: 93 kg (205 lb 0.4 oz)  Height: 5' 5" (165.1 cm)  Body mass index is 34.12 kg/m².      Intake/Output Summary (Last 24 hours) at 9/8/2024 0704  Last data filed at 9/7/2024 1800  Gross per 24 hour   Intake --   Output 500 ml   Net -500 ml        Ortho/SPM Exam     AAOx4  NAD  Tachycardic  No increased WOB    RLE    Dressing C/D/I   Wound vac to good suction  Compartments soft/compressible  Reduced sensation to the dorsum of toe  Active toe dorsiflexion & plantarflexion  WWP. Brisk cap refill      Significant Labs: All pertinent labs within the past 24 hours have been reviewed.    Significant Imaging: I have reviewed and interpreted all pertinent imaging results/findings.  Assessment/Plan:     * Surgical wound breakdown  Cortes Schroeder is a 64 y.o. male with PMH of DM, HTN  and right trimalleolar ankle fracture status post ex fix on 07/18 with subsequent definitive fixation on 07/26 presenting with right ankle wound and concern for sepsis.  Patient brought to ED for SOB by a roommate as you noticed mental status change yesterday afternoon.  Upon presentation, patient afebrile, tachycardic and hypotensive.  Sepsis protocol initiated.  Patient received clindamycin and vancomycin in the ED. WBC 18.68, .3.  On exam patient has draining wound over the medial side of the right ankle as well as eschar formation over the lateral side of the ankle.  Right ankle and foot erythematous and edematous consistent with postoperative infection. He is s/p I&D of R ankle 09/06    - Patient admitted to hospital medicine  - ABX : Zosyn  - Multimodal pain management  -DVT prophy: lovenox  - F/u " culture. JYOTI Isabel MD  Orthopedics  [facility] - Surgery

## 2024-09-08 NOTE — PROGRESS NOTES
Nurses Note -- 4 Eyes      9/8/2024   10:59 AM      Skin assessed during: Q Shift Change      [x] No Altered Skin Integrity Present    []Prevention Measures Documented      [] Yes- Altered Skin Integrity Present or Discovered   [] LDA Added if Not in Epic (Describe Wound)   [] New Altered Skin Integrity was Present on Admit and Documented in LDA   [] Wound Image Taken    Wound Care Consulted? No    Attending Nurse:  Roseanne Hoffmann RN/Staff Member:  Tesfaye PRITCHARD

## 2024-09-08 NOTE — CARE UPDATE
RAPID RESPONSE NURSE PROACTIVE ROUNDING NOTE       Time of Visit: 0850    Admit Date: 2024  LOS: 2  Code Status: Full Code   Date of Visit: 2024  : 1960  Age: 64 y.o.  Sex: male  Race: White  Bed: 2/[facility] A:   MRN: [id number]  Was the patient discharged from an ICU this admission? No   Was the patient discharged from a PACU within last 24 hours? No   Did the patient receive conscious sedation/general anesthesia in last 24 hours? No  Was the patient in the ED within the past 24 hours? No  Was the patient on NIPPV within the past 24 hours? No   Attending Physician: Filemon Miner MD  Primary Service: [facility]   Time spent at the bedside: < 15 min    SITUATION    Notified by charge RN via phone call.  Reason for alert: fever/tachycardia  Called to evaluate the patient for  sepsis .    BACKGROUND     Why is the patient in the hospital?: Surgical wound breakdown    Patient has a past medical history of Anxiety, Back pain, Cataract, Chronic pain syndrome, Diabetes type 2, controlled, Diabetic retinopathy, DM (diabetes mellitus), DM (diabetes mellitus), type 2, uncontrolled, Gastroesophageal reflux disease without esophagitis, Hyperlipidemia, Insomnia, and Neuropathy.    Last Vitals:  Temp: 102.9 °F (39.4 °C) (752)  Pulse: 120 (752)  Resp: 18 (917)  BP: 141/69 (752)  SpO2: 98 % (752)    24 Hours Vitals Range:  Temp:  [97.6 °F (36.4 °C)-102.9 °F (39.4 °C)]   Pulse:  []   Resp:  [16-22]   BP: (102-171)/(57-83)   SpO2:  [90 %-98 %]     Labs:  Recent Labs     24  0235 24  0242 24  0519 24  0536   WBC 18.68*  --  17.10* 21.23*   HGB 11.2*  --  10.1* 9.8*   HCT 32.8* 36 30.6* 29.3*     --  359 416       Recent Labs     24  0235 24  0420 24  0519 24  0910 24  0536   *  --  132*  --  132*   K 4.4  --  4.0  --  3.9   CL 95  --  104  --  104   CO2 19*  --  18*  --  20*   BUN 38*  --  23  --  16    CREATININE 1.7*  --  1.2  --  1.2   *  --  193*  --  111*   PHOS  --  1.9*  --  1.8* 2.0*   MG  --  2.3  --   --  1.9        Recent Labs     09/06/24  0242   PH 7.332*   PCO2 38.8   PO2 49   HCO3 20.5*   POCSATURATED 82   BE -5*        ASSESSMENT      Physical Exam  Vitals reviewed.   Constitutional:       Appearance: He is ill-appearing.   Cardiovascular:      Rate and Rhythm: Regular rhythm. Tachycardia present.      Pulses: Normal pulses.   Skin:     General: Skin is warm.   Neurological:      Mental Status: He is alert and oriented to person, place, and time.      Comments: Mild confusion (unable to recall eating breakfast this morning)       Overall physical assessment is similar to yesterdays. He is complaining of pain 8/10 to lower extremity. Bedside RN to administer PRN pain medication. Current axillary temperature is 101.9, Bedside RN to administer tylenol as ordered. Lactic was ordered based on sepsis screen.     INTERVENTIONS    The patient was seen for Medical problem. Staff concerns included sepsis. The following interventions were performed: lactic acid and continuous cardiac monitoring continued.    RECOMMENDATIONS    - Continue antibiotic therapy based on ID recommendations  - Follow up on pending lactic  - Continuous cardiac monitoring      PROVIDER ESCALATION    Yes/No  No    Orders received and case discussed with NA.    Disposition: Remain in room 542.    FOLLOW-UP    Charge Tesfaye PRITCHARD and Bedside, FRANCHESKA Cronin  updated on plan of care. Instructed to call the Rapid Response Nurse, Kristi Mims RN at [phone] for additional questions or concerns.

## 2024-09-08 NOTE — SUBJECTIVE & OBJECTIVE
"Interval HPI:   Overnight events: No acute events overnight. Patient in room 542/542 A. Blood glucose stable. BG at goal on current insulin regimen (SSI, prandial, and basal insulin ). Steroid use- None. 2 Days Post-Op  Renal function- Normal   Vasopressors-  None     Of note, the patient's basal insulin was held overnight. Endocrine on-call not notified. Timed the Lantus for this morning so that the patient doesn't have subsequent hyperglycemia . 0200 BG check not done.    Endocrine will continue to follow and manage insulin orders inpatient.       Diet NPO  Diet NPO     Eating:   NPO  Nausea: No  Hypoglycemia and intervention: No  Fever: No  TPN and/or TF: No    BP (!) 141/69 (Patient Position: Lying)   Pulse (!) 114   Temp 98.9 °F (37.2 °C) (Oral)   Resp 18   Ht 5' 5" (1.651 m)   Wt 93 kg (205 lb 0.4 oz)   SpO2 95%   BMI 34.12 kg/m²     Labs Reviewed and Include    Recent Labs   Lab 09/08/24  0536   *   CALCIUM 8.6*   ALBUMIN 1.7*   PROT 5.8*   *   K 3.9   CO2 20*      BUN 16   CREATININE 1.2   ALKPHOS 253*   ALT 47*   AST 79*   BILITOT 0.7     Lab Results   Component Value Date    WBC 21.23 (H) 09/08/2024    HGB 9.8 (L) 09/08/2024    HCT 29.3 (L) 09/08/2024    MCV 84 09/08/2024     09/08/2024     No results for input(s): "TSH", "FREET4" in the last 168 hours.  Lab Results   Component Value Date    HGBA1C 8.5 (H) 09/06/2024       Nutritional status:   Body mass index is 34.12 kg/m².  Lab Results   Component Value Date    ALBUMIN 1.7 (L) 09/08/2024    ALBUMIN 2.4 (L) 09/06/2024    ALBUMIN 2.4 (L) 07/23/2024     Lab Results   Component Value Date    PREALBUMIN 3 (L) 09/06/2024    PREALBUMIN 13 (L) 07/18/2024       Estimated Creatinine Clearance: 66 mL/min (based on SCr of 1.2 mg/dL).    Accu-Checks  Recent Labs     09/06/24  0922 09/06/24  1220 09/06/24  1702 09/06/24  2141 09/07/24  0859 09/07/24  1119 09/07/24  1544 09/07/24  2102 09/08/24  0746   POCTGLUCOSE 216* 201* 256* 283* " 186* 185* 189* 80 145*       Current Medications and/or Treatments Impacting Glycemic Control  Immunotherapy:    Immunosuppressants       None          Steroids:   Hormones (From admission, onward)      None          Pressors:    Autonomic Drugs (From admission, onward)      None          Hyperglycemia/Diabetes Medications:   Antihyperglycemics (From admission, onward)      Start     Stop Route Frequency Ordered    09/08/24 0936  insulin glargine U-100 (Lantus) pen 45 Units         -- SubQ Daily 09/08/24 0937    09/07/24 0715  insulin aspart U-100 pen 14 Units         -- SubQ 3 times daily with meals 09/06/24 1858 09/06/24 1957  insulin aspart U-100 pen 0-15 Units         -- SubQ Before meals, nightly and at 0200 PRN 09/06/24 1858

## 2024-09-08 NOTE — PLAN OF CARE
Problem: Adult Inpatient Plan of Care  Goal: Plan of Care Review  9/8/2024 0414 by Luciana Altamirano LPN  Outcome: Progressing  9/8/2024 0414 by Luciana Altamirano LPN  Outcome: Progressing  Goal: Patient-Specific Goal (Individualized)  9/8/2024 0414 by Luciana Altamirano LPN  Outcome: Progressing  9/8/2024 0414 by Luciana Altamirano LPN  Outcome: Progressing  Goal: Absence of Hospital-Acquired Illness or Injury  9/8/2024 0414 by Luciana Altamirano LPN  Outcome: Progressing  9/8/2024 0414 by Luciana Altamirano LPN  Outcome: Progressing  Goal: Optimal Comfort and Wellbeing  9/8/2024 0414 by Luciana Altamirano LPN  Outcome: Progressing  9/8/2024 0414 by Luciana Altamirano LPN  Outcome: Progressing  Goal: Readiness for Transition of Care  9/8/2024 0414 by Luciana Altamirano LPN  Outcome: Progressing  9/8/2024 0414 by Luciana Altamirano LPN  Outcome: Progressing     Problem: Fall Injury Risk  Goal: Absence of Fall and Fall-Related Injury  9/8/2024 0414 by Luciana Altamirano LPN  Outcome: Progressing  9/8/2024 0414 by Luciana Altamirano LPN  Outcome: Progressing     Problem: Sepsis/Septic Shock  Goal: Optimal Coping  9/8/2024 0414 by Luciana Altamirano LPN  Outcome: Progressing  9/8/2024 0414 by Luciana Altamirano LPN  Outcome: Progressing  Goal: Absence of Bleeding  9/8/2024 0414 by Luciana Altamirano LPN  Outcome: Progressing  9/8/2024 0414 by Luciana Altamirano LPN  Outcome: Progressing  Goal: Blood Glucose Level Within Targeted Range  9/8/2024 0414 by Luciana Altamirano LPN  Outcome: Progressing  9/8/2024 0414 by Luciana Altamirano LPN  Outcome: Progressing  Goal: Absence of Infection Signs and Symptoms  9/8/2024 0414 by Luciana Altamirano LPN  Outcome: Progressing  9/8/2024 0414 by Luciana Altamirano LPN  Outcome: Progressing  Goal: Optimal Nutrition Intake  9/8/2024 0414 by Luciana Altamirano LPN  Outcome: Progressing  9/8/2024  0414 by Luciana Altamirano LPN  Outcome: Progressing     Problem: Acute Kidney Injury/Impairment  Goal: Fluid and Electrolyte Balance  Outcome: Progressing  Goal: Improved Oral Intake  Outcome: Progressing  Goal: Effective Renal Function  Outcome: Progressing     Problem: Wound  Goal: Optimal Coping  Outcome: Progressing  Goal: Optimal Functional Ability  Outcome: Progressing  Goal: Absence of Infection Signs and Symptoms  Outcome: Progressing  Goal: Improved Oral Intake  Outcome: Progressing  Goal: Optimal Pain Control and Function  Outcome: Progressing  Goal: Skin Health and Integrity  Outcome: Progressing  Goal: Optimal Wound Healing  Outcome: Progressing     Problem: Diabetes Comorbidity  Goal: Blood Glucose Level Within Targeted Range  Outcome: Progressing     Problem: Skin Injury Risk Increased  Goal: Skin Health and Integrity  Outcome: Progressing

## 2024-09-08 NOTE — ASSESSMENT & PLAN NOTE
This patient does have evidence of infective focus  My overall impression is sepsis.  Source: Skin and Soft Tissue (location ankle)  Antibiotics given-   Antibiotics (72h ago, onward)    Start     Stop Route Frequency Ordered    09/08/24 1200  ceFAZolin 2 g in D5W 50 mL IVPB (MB+)         -- IV Every 6 hours (non-standard times) 09/08/24 1057        Latest lactate reviewed-  Recent Labs   Lab 09/06/24  0536 09/07/24  0910   LACTATE  --  1.6   POCLAC 1.23  --        Organ dysfunction indicated by Acute kidney injury and Encephalopathy    Fluid challenge Actual Body weight- Patient will receive 30ml/kg actual body weight to calculate fluid bolus for treatment of septic shock.     Post- resuscitation assessment No - Post resuscitation assessment not needed     Will Not start Pressors-   Source control achieved by: see above

## 2024-09-08 NOTE — ASSESSMENT & PLAN NOTE
Patient's FSGs are not controlled on current hypoglycemics.   Last A1c reviewed-   Lab Results   Component Value Date    LABA1C 10.8 (H) 08/15/2016    HGBA1C 8.5 (H) 09/06/2024     Most recent fingerstick glucose reviewed-   Recent Labs   Lab 09/07/24  1544 09/07/24  2102 09/08/24  0746 09/08/24  1137   POCTGLUCOSE 189* 80 145* 90       Current correctional scale  Low  Maintain anti-hyperglycemic dose as follows-   Antihyperglycemics (From admission, onward)    Start     Stop Route Frequency Ordered    09/08/24 0936  insulin glargine U-100 (Lantus) pen 45 Units         -- SubQ Daily 09/08/24 0937    09/07/24 0715  insulin aspart U-100 pen 14 Units         -- SubQ 3 times daily with meals 09/06/24 1858 09/06/24 1957  insulin aspart U-100 pen 0-15 Units         -- SubQ Before meals, nightly and at 0200 PRN 09/06/24 1858         - Endocrine consulted:  - At this time, recommend that the patient remain off of his pump since he cannot be controlled in the Auto mode. Patient does not interact with pump frequently and relies on the auto mode algorithm.   - Lantus (Insulin Glargine) 40 units nightly   - Novolog (Insulin Aspart) 14 units TIDWM and prn for BG excursions MDC SSI (150/25)  - hold oral antihyperglycemics during hospitalization   - needs better BG control for optimal wound healing

## 2024-09-08 NOTE — PROGRESS NOTES
VANCOMYCIN DOSING BY PHARMACY DISCONTINUATION NOTE    Cortes Schroeder is a 64 y.o. male who had been consulted for vancomycin dosing.    The pharmacy consult for vancomycin dosing has been discontinued.     Vancomycin Dosing by Pharmacy Consult will sign-off. Please reconsult if necessary. Thank you for allowing us to participate in this patient's care.       Devon Barber, VanessaD  [id number]

## 2024-09-08 NOTE — ASSESSMENT & PLAN NOTE
- 2/2 blood cx with Staph aureus. 1/2 repeats positive. Repeats pending   - Follow repeat blood cx  - ID following: start cefazolin  - echo pending

## 2024-09-08 NOTE — ASSESSMENT & PLAN NOTE
I have reviewed hospital notes from   service and other specialty providers. I have also reviewed CBC, CMP/BMP,  cultures and imaging with my interpretation as documented.      64M with poorly controlled DM (A1C 8.5), recent fall resulting in Left wrist fx and right ankle fracture 07/18/24 s/p ex fix Rt ankle and ORIF left wrist 07/19, and later subsequent ORIF RT ankle on 7/26/24 with Dr. Liz -- now c/b deep surgical site infection involving underlying hardware of Rt ankle. (Of note, L wrist without s/sxs of infection, well healed.) Pt arrived with AMS, temps 99, wbc 18, . Index bld cxs 09/06 +MSSA. Repeat bld cxs 09/07 positive. Repeats 09/08 pending. Recommended TTE.     S/p I&D 09/06. Op report noting purulence overlying hardware, cxs +GBS, +staph aureus (susc pending). No plans for hardware removal, but Ortho plans to return to OR Monday 09/09 for repeat I&D with Plastic surgery assistance for closure.     Pt developed fevers 103F, tachycardia, and increasing leukocytosis post-op day 1 (09/07); cefepime escalated to zosyn, and vanc continued, as we did not have susc of staph back at that time. However, now with MSSA on bld cxs, and GBS with staph aureus on wound cxs, stopped vanc / zosyn, to start cefaozlin 2g Q6h. Fever curve and tachycardia improving. Pt w/o acute complaint or distress at this time.     Of note, AMS improved some since arrival, but still intermittent. May be multi-factorial in nature, but if worsens or persists despite otherwise clinical improvement, would further work-up.     Recommendations / Plan:  Continue Iv-cefazolin 2g Q6h.  Will f/u repeat bld cxs 09/08 to ensure cleared  Will f/u surgical cxs and adjust abx accordingly  Recommend TTE  Anticipate Iv abx duration of 6wks s/p 09/09, DON 10/21.  Once bld cxs remain cleared for 72hrs, will consider adding rifampin for improved penetration of hardware biofilm, considering retention of involved underling hardware.   Anticipate  abx suppression following Iv abx treatment course.  Of note, Pt continuing on Truvada during admission as PrEP since ~2018, per PCP. Continue to monitor kidney and liver function.       -- Discussed with ID staff and primary team   -- ID will continue to follow w/ further recs.

## 2024-09-08 NOTE — SUBJECTIVE & OBJECTIVE
"Principal Problem:Surgical wound breakdown    Principal Orthopedic Problem: s/p I&D and wound vac placement on 09/06    Interval History: Pt seen and examined at bedside. tachycardic.  NAEON. Reports no new symptoms. Denies fevers or chill.      Review of patient's allergies indicates:   Allergen Reactions    Invokana [canagliflozin] Anaphylaxis    Percocet [oxycodone-acetaminophen] Nausea Only and Hallucinations    Biaxin [clarithromycin]     Hydrocodone Other (See Comments)     Dizzy/nausea/hallucinations    Sulfa (sulfonamide antibiotics) Nausea Only and Rash       Current Facility-Administered Medications   Medication    acetaminophen tablet 1,000 mg    atorvastatin tablet 40 mg    dextrose 10% bolus 125 mL 125 mL    dextrose 10% bolus 125 mL 125 mL    dextrose 10% bolus 250 mL 250 mL    dextrose 10% bolus 250 mL 250 mL    emtricitabine-tenofovir 200-300 mg per tablet 1 tablet    enoxaparin injection 40 mg    gabapentin capsule 600 mg    And    gabapentin capsule 1,200 mg    glucagon (human recombinant) injection 1 mg    glucose chewable tablet 16 g    glucose chewable tablet 24 g    insulin aspart U-100 pen 0-15 Units    insulin aspart U-100 pen 14 Units    insulin glargine U-100 (Lantus) pen 40 Units    morphine injection 2 mg    morphine injection 4 mg    nortriptyline capsule 50 mg    PHENYLephrine injection 100 mcg    PHENYLephrine injection 100 mcg    piperacillin-tazobactam (ZOSYN) 4.5 g in D5W 100 mL IVPB (MB+)    vancomycin - pharmacy to dose     Objective:     Vital Signs (Most Recent):  Temp: 99.7 °F (37.6 °C) (09/08/24 0438)  Pulse: (!) 116 (09/08/24 0438)  Resp: 18 (09/08/24 0438)  BP: (!) 171/83 (09/08/24 0438)  SpO2: 96 % (09/08/24 0438) Vital Signs (24h Range):  Temp:  [97.6 °F (36.4 °C)-101.1 °F (38.4 °C)] 99.7 °F (37.6 °C)  Pulse:  [] 116  Resp:  [16-22] 18  SpO2:  [90 %-97 %] 96 %  BP: (102-171)/(57-83) 171/83     Weight: 93 kg (205 lb 0.4 oz)  Height: 5' 5" (165.1 cm)  Body mass index " is 34.12 kg/m².      Intake/Output Summary (Last 24 hours) at 9/8/2024 0704  Last data filed at 9/7/2024 1800  Gross per 24 hour   Intake --   Output 500 ml   Net -500 ml        Ortho/SPM Exam     AAOx4  NAD  Tachycardic  No increased WOB    RLE    Dressing C/D/I   Wound vac to good suction  Compartments soft/compressible  Reduced sensation to the dorsum of toe  Active toe dorsiflexion & plantarflexion  WWP. Brisk cap refill      Significant Labs: All pertinent labs within the past 24 hours have been reviewed.    Significant Imaging: I have reviewed and interpreted all pertinent imaging results/findings.

## 2024-09-08 NOTE — PROGRESS NOTES
Occupational Therapy Visit    Referred by: Imani Lopez NP; Medical Diagnosis (from order):    Diagnosis Information        Diagnosis    Q23.4 (ICD-10-CM) - HLHS (hypoplastic left heart syndrome)                This visit is being performed virtually via Video visit using [employer].   Clinical Location: Home  Suzanne's location Home and is physically present in   the [facility] at the time of this visit.    Visit: 27    Visit Type: Daily Treatment Note    SUBJECTIVE                                                                                                             Present and reporting subjective information: caregiver  Suzanne is feeling a little tired today reports Dudley the nurse. Dad was out running errands.     OBJECTIVE                                                                                                                      Hand Skills Development:  Comments / Details: Shape sorter  Pull tubes  Isolation of index finger  Turning pages of book  Pointing at animals in book      Sensory:   Peek a hand  Spinning ring   Auditory book with lights               ASSESSMENT                                                                                                               Peek a hand- with Suzanne he pulls blanket down and smiles but keeps crawling away from nurse to go to the TV. Began to fuss but easily redirected with a talking book. He will point and press the squares on the side but not the ones being asked by nurse. He began to fuss again to lay down instead of sit up. So he was positioned in high chair giving him the shape sorter. He took the heart he is now trying to put in on his own. He did get it in, will throw the shapes as well  Spinner ring  likes to throw them off the tray. He attempted to put on and then threw it. He will watch it spin down. Ignoring the toy unless nurse makes it spin. He will bang the circles together or throw them. Keys with  Pharmacokinetic Assessment Follow Up: IV Vancomycin    Vancomycin serum concentration assessment/plan:    Vancomycin random level resulted subtherapeutic at 12.4 ug/mL (~24 hours post-dose)   Goal trough is 15-20 ug/mL  Scr 1.2 today; stable from yesterday. DEEPALI resolving.   Change regimen to Vancomycin 1750 mg IV every 24 hours   Next serum trough concentration measured at 0930 prior to 3rd dose on 9/10/24    Drug levels (last 3 results):  Recent Labs   Lab Result Units 09/07/24  0519 09/08/24  0921   Vancomycin, Random ug/mL 12.4 12.9       Pharmacy will continue to follow and monitor vancomycin.    Please contact pharmacy at extension [phone] for questions regarding this assessment.    Thank you for the consult,   Devon Barber       Patient brief summary:  Cortes Schroeder is a 64 y.o. male initiated on antimicrobial therapy with IV Vancomycin for treatment of bacteremia      Drug Allergies:   Review of patient's allergies indicates:   Allergen Reactions    Invokana [canagliflozin] Anaphylaxis    Percocet [oxycodone-acetaminophen] Nausea Only and Hallucinations    Biaxin [clarithromycin]     Hydrocodone Other (See Comments)     Dizzy/nausea/hallucinations    Sulfa (sulfonamide antibiotics) Nausea Only and Rash       Actual Body Weight:   93 kg     Renal Function:   Estimated Creatinine Clearance: 66 mL/min (based on SCr of 1.2 mg/dL).,     Dialysis Method (if applicable):  N/A    CBC (last 72 hours):  Recent Labs   Lab Result Units 09/06/24  0235 09/06/24  0420 09/07/24  0519 09/08/24  0536   WBC K/uL 18.68*  --  17.10* 21.23*   Hemoglobin g/dL 11.2*  --  10.1* 9.8*   Hemoglobin A1C %  --  8.5*  --   --    Hematocrit % 32.8*  --  30.6* 29.3*   Platelets K/uL 327  --  359 416   Gran % % 88.0*  --  86.0*  --    Lymph % % 3.0*  --  5.6*  --    Mono % % 5.0  --  7.0  --    Eosinophil % % 0.0  --  0.1  --    Basophil % % 1.0  --  0.4  --    Differential Method  Manual  --  Automated  --        Metabolic Panel (last 72  buttons, tends to use thumb or whole hand. Working on isolatin of index finger. Accordion tubes- needs max assist to pull tubes apart. Will swing them around but did not try to make it big or small. Sung Itsy Bitsy spider song. He watched nurse sing the song and her hands move. Then had nurse take his hands and sing the song. She reported he is assisting in moving his hands to the song. He enjoys watching Ms. Malin on tv. Therapist suggested to see which songs is his favorite and then see if there is hand gestures to the song without having Ms. Malin on.          Therapy procedure time and total treatment time can be found documented on the Time Entry flowsheet     hours):  Recent Labs   Lab Result Units 09/06/24  0235 09/06/24  0254 09/06/24  0420 09/07/24  0519 09/07/24  0910 09/08/24  0536   Sodium mmol/L 127*  --   --  132*  --  132*   Sodium, Urine mmol/L  --  20  --   --   --   --    Potassium mmol/L 4.4  --   --  4.0  --  3.9   Chloride mmol/L 95  --   --  104  --  104   CO2 mmol/L 19*  --   --  18*  --  20*   Glucose mg/dL 311*  --   --  193*  --  111*   Glucose, UA   --  4+*  --   --   --   --    BUN mg/dL 38*  --   --  23  --  16   Creatinine mg/dL 1.7*  --   --  1.2  --  1.2   Albumin g/dL 2.4*  --   --   --   --  1.7*   Total Bilirubin mg/dL 0.6  --   --   --   --  0.7   Alkaline Phosphatase U/L 242*  --   --   --   --  253*   AST U/L 42*  --   --   --   --  79*   ALT U/L 32  --   --   --   --  47*   Magnesium mg/dL  --   --  2.3  --   --  1.9   Phosphorus mg/dL  --   --  1.9*  --  1.8* 2.0*       Vancomycin Administrations:  vancomycin given in the last 96 hours                     vancomycin 1,500 mg in D5W 250 mL IVPB (admixture device) (mg) 1,500 mg New Bag 09/07/24 0939    vancomycin injection (g) 1 g Given 09/06/24 1514    vancomycin (VANCOCIN) 2,250 mg in D5W 500 mL IVPB (mg) 2,250 mg New Bag 09/06/24 0340                    Microbiologic Results:  Microbiology Results (last 7 days)       Procedure Component Value Units Date/Time    Blood culture [[id number]]     Order Status: Sent Specimen: Blood     Blood culture [[id number]]     Order Status: Sent Specimen: Blood     Aerobic culture [[id number]]  (Abnormal) Collected: 09/06/24 1446    Order Status: Completed Specimen: Incision site from Ankle, Right Updated: 09/08/24 0824     Aerobic Bacterial Culture STAPHYLOCOCCUS AUREUS  Many  Susceptibility pending        STREPTOCOCCUS AGALACTIAE (GROUP B)  Many  Beta-hemolytic streptococci are routinely susceptible to   penicillins,cephalosporins and carbapenems.      Narrative:      Right ankle wound - culture    Blood culture x two cultures. Draw prior to  antibiotics. [[id number]]  (Abnormal) Collected: 09/06/24 0235    Order Status: Completed Specimen: Blood from Peripheral, Antecubital, Left Updated: 09/08/24 0713     Blood Culture, Routine Gram stain aer bottle: Gram positive cocci in clusters resembling Staph      Gram stain alicia bottle: Gram positive cocci in clusters resembling Staph      Results called to and read back by:Luciana Altamirano RN 09/06/2024      20:55      STAPHYLOCOCCUS AUREUS  For susceptibility see order #[id number]      Narrative:      Aerobic and anaerobic    Blood culture x two cultures. Draw prior to antibiotics. [[id number]]  (Abnormal)  (Susceptibility) Collected: 09/06/24 0235    Order Status: Completed Specimen: Blood from Peripheral, Antecubital, Left Updated: 09/08/24 0711     Blood Culture, Routine Gram stain aer bottle: Gram positive cocci in clusters resembling Staph      Gram stain alicia bottle: Gram positive cocci in clusters resembling Staph      Results called to and read back by:Luciana Altamirano RN 09/06/2024      20:52      STAPHYLOCOCCUS AUREUS    Narrative:      Aerobic and anaerobic    Blood culture [[id number]] Collected: 09/07/24 0910    Order Status: Completed Specimen: Blood Updated: 09/08/24 0400     Blood Culture, Routine Gram stain aer bottle: Gram positive cocci in clusters resembling Staph      Gram stain alicia bottle: Gram positive cocci in clusters resembling Staph      Results called to and read back by:Luciana Altamirano LPN 09/08/2024      03:59    AFB Culture & Smear [[id number]] Collected: 09/06/24 1446    Order Status: Completed Specimen: Incision site from Ankle, Right Updated: 09/07/24 2127     AFB Culture & Smear Culture in progress    Narrative:      Right ankle wound - culture    Blood culture [[id number]] Collected: 09/07/24 0910    Order Status: Completed Specimen: Blood Updated: 09/07/24 1715     Blood Culture, Routine No Growth to date    Culture, Anaerobe [[id number]] Collected: 09/06/24 1446     Order Status: Completed Specimen: Incision site from Ankle, Right Updated: 09/07/24 0731     Anaerobic Culture Culture in progress    Narrative:      Right ankle wound - culture    MRSA/SA Rapid ID by PCR from Blood culture [[id number]]  (Abnormal) Collected: 09/06/24 0235    Order Status: Completed Updated: 09/06/24 2224     Staph aureus ID by PCR Positive     Methicillin Resistant ID by PCR Negative    Narrative:      Aerobic and anaerobic    Gram stain [[id number]] Collected: 09/06/24 1446    Order Status: Completed Specimen: Incision site from Ankle, Right Updated: 09/06/24 1805     Gram Stain Result Rare WBC's      Few Gram positive cocci    Narrative:      Right ankle wound - culture    Fungus culture [[id number]] Collected: 09/06/24 1446    Order Status: Sent Specimen: Incision site from Ankle, Right Updated: 09/06/24 1501

## 2024-09-08 NOTE — ASSESSMENT & PLAN NOTE
Closed trimalleolar fracture of right ankle s/p ORIF in July  63 yo M presenting with AMS and worsening redness, swelling and pain to R ankle.     - continue IV vanc and cefepime  - Ortho consulted:   - taken to OR 09/06 for debridement of surgical wound    - will take back to OR tomorrow. NPO at midnight  - follow wound cultures -- Staph aureus and GBS  - follow blood cultures -- Staph aureus.   - ID following:   - changing to cefazolin today based on cultures   - echo pending

## 2024-09-08 NOTE — ASSESSMENT & PLAN NOTE
Hyponatremia is likely due to Dehydration/hypovolemia. The patient's most recent sodium results are listed below.  Recent Labs     09/06/24  0235 09/07/24  0519 09/08/24  0536   * 132* 132*       Plan  - Correct the sodium by 4-6mEq in 24 hours.   - Obtain the following studies: Urine sodium, urine osmolality, serum osmolality.  - Will treat the hyponatremia with IV fluids as follows: NS  - Monitor sodium Daily.   - Patient hyponatremia is improving

## 2024-09-08 NOTE — ASSESSMENT & PLAN NOTE
Endocrinology consulted for BG management.   BG goal 140-180    At this time, recommend that the patient remain off of his pump since he cannot be controlled in the Auto mode. Patient does not interact with pump frequently and relies on the auto mode algorithm.     WBD 0.8 units/kg/day  - Lantus (Insulin Glargine) 45 units nightly   - Novolog (Insulin Aspart) 14 units TIDWM (Hold if NPO) and prn for BG excursions [facility] SSI (150/25)  - BG checks AC/HS/0200  - Hypoglycemia protocol in place    ** Please notify Endocrine for any change and/or advance in diet**  ** Please call Endocrine for any BG related issues **    Discharge Planning:   TBD. Please notify endocrinology prior to discharge.

## 2024-09-09 ENCOUNTER — ANESTHESIA (OUTPATIENT)
Dept: SURGERY | Facility: HOSPITAL | Age: 64
End: 2024-09-09
Payer: COMMERCIAL

## 2024-09-09 PROBLEM — D75.839 THROMBOCYTOSIS: Status: ACTIVE | Noted: 2024-09-09

## 2024-09-09 PROBLEM — R74.01 TRANSAMINITIS: Status: ACTIVE | Noted: 2024-09-09

## 2024-09-09 LAB
ACID FAST MOD KINY STN SPEC: NORMAL
ALBUMIN SERPL BCP-MCNC: 1.7 G/DL (ref 3.5–5.2)
ALP SERPL-CCNC: 375 U/L (ref 55–135)
ALT SERPL W/O P-5'-P-CCNC: 59 U/L (ref 10–44)
ANION GAP SERPL CALC-SCNC: 11 MMOL/L (ref 8–16)
ASCENDING AORTA: 3.1 CM
AST SERPL-CCNC: 155 U/L (ref 10–40)
AV INDEX (PROSTH): 1.14
AV MEAN GRADIENT: 4 MMHG
AV PEAK GRADIENT: 7 MMHG
AV VALVE AREA BY VELOCITY RATIO: 2.94 CM²
AV VALVE AREA: 3.72 CM²
AV VELOCITY RATIO: 0.9
BACTERIA SPEC AEROBE CULT: ABNORMAL
BACTERIA SPEC AEROBE CULT: ABNORMAL
BILIRUB SERPL-MCNC: 0.5 MG/DL (ref 0.1–1)
BSA FOR ECHO PROCEDURE: 2.07 M2
BUN SERPL-MCNC: 18 MG/DL (ref 8–23)
CALCIUM SERPL-MCNC: 8.9 MG/DL (ref 8.7–10.5)
CHLORIDE SERPL-SCNC: 98 MMOL/L (ref 95–110)
CO2 SERPL-SCNC: 21 MMOL/L (ref 23–29)
CREAT SERPL-MCNC: 1.6 MG/DL (ref 0.5–1.4)
CV ECHO LV RWT: 0.48 CM
DOP CALC AO PEAK VEL: 1.31 M/S
DOP CALC AO VTI: 20.14 CM
DOP CALC LVOT AREA: 3.3 CM2
DOP CALC LVOT DIAMETER: 2.04 CM
DOP CALC LVOT PEAK VEL: 1.18 M/S
DOP CALC LVOT STROKE VOLUME: 74.84 CM3
DOP CALCLVOT PEAK VEL VTI: 22.91 CM
E WAVE DECELERATION TIME: 147.14 MSEC
E/A RATIO: 0.88
E/E' RATIO: 10.22 M/S
ECHO LV POSTERIOR WALL: 0.87 CM (ref 0.6–1.1)
ERYTHROCYTE [DISTWIDTH] IN BLOOD BY AUTOMATED COUNT: 15.6 % (ref 11.5–14.5)
EST. GFR  (NO RACE VARIABLE): 48.1 ML/MIN/1.73 M^2
FRACTIONAL SHORTENING: 32 % (ref 28–44)
GLUCOSE SERPL-MCNC: 165 MG/DL (ref 70–110)
HCT VFR BLD AUTO: 31.8 % (ref 40–54)
HGB BLD-MCNC: 9.9 G/DL (ref 14–18)
INTERVENTRICULAR SEPTUM: 0.89 CM (ref 0.6–1.1)
LA MAJOR: 4.71 CM
LA MINOR: 4.5 CM
LA WIDTH: 3.76 CM
LEFT ATRIUM SIZE: 3 CM
LEFT ATRIUM VOLUME INDEX MOD: 22.3 ML/M2
LEFT ATRIUM VOLUME INDEX: 22.1 ML/M2
LEFT ATRIUM VOLUME MOD: 44.61 CM3
LEFT ATRIUM VOLUME: 44.13 CM3
LEFT INTERNAL DIMENSION IN SYSTOLE: 2.49 CM (ref 2.1–4)
LEFT VENTRICLE DIASTOLIC VOLUME INDEX: 27.9 ML/M2
LEFT VENTRICLE DIASTOLIC VOLUME: 55.8 ML
LEFT VENTRICLE MASS INDEX: 46 G/M2
LEFT VENTRICLE SYSTOLIC VOLUME INDEX: 11.1 ML/M2
LEFT VENTRICLE SYSTOLIC VOLUME: 22.21 ML
LEFT VENTRICULAR INTERNAL DIMENSION IN DIASTOLE: 3.64 CM (ref 3.5–6)
LEFT VENTRICULAR MASS: 91.48 G
LV LATERAL E/E' RATIO: 8.36 M/S
LV SEPTAL E/E' RATIO: 13.14 M/S
MAGNESIUM SERPL-MCNC: 2.1 MG/DL (ref 1.6–2.6)
MCH RBC QN AUTO: 27.5 PG (ref 27–31)
MCHC RBC AUTO-ENTMCNC: 31.1 G/DL (ref 32–36)
MCV RBC AUTO: 88 FL (ref 82–98)
MV A" WAVE DURATION": 5.14 MSEC
MV PEAK A VEL: 1.04 M/S
MV PEAK E VEL: 0.92 M/S
MV STENOSIS PRESSURE HALF TIME: 42.67 MS
MV VALVE AREA P 1/2 METHOD: 5.16 CM2
MYCOBACTERIUM SPEC QL CULT: NORMAL
PHOSPHATE SERPL-MCNC: 3.5 MG/DL (ref 2.7–4.5)
PLATELET # BLD AUTO: 498 K/UL (ref 150–450)
PMV BLD AUTO: 10.6 FL (ref 9.2–12.9)
POCT GLUCOSE: 164 MG/DL (ref 70–110)
POCT GLUCOSE: 171 MG/DL (ref 70–110)
POCT GLUCOSE: 182 MG/DL (ref 70–110)
POCT GLUCOSE: 235 MG/DL (ref 70–110)
POTASSIUM SERPL-SCNC: 3.7 MMOL/L (ref 3.5–5.1)
PROT SERPL-MCNC: 6.4 G/DL (ref 6–8.4)
PULM VEIN S/D RATIO: 1.65
PV PEAK D VEL: 0.37 M/S
PV PEAK S VEL: 0.61 M/S
RA MAJOR: 4.19 CM
RA PRESSURE ESTIMATED: 3 MMHG
RA WIDTH: 3.31 CM
RBC # BLD AUTO: 3.6 M/UL (ref 4.6–6.2)
RIGHT VENTRICLE DIASTOLIC BASEL DIMENSION: 2.4 CM
RV TISSUE DOPPLER FREE WALL SYSTOLIC VELOCITY 1 (APICAL 4 CHAMBER VIEW): 16.55 CM/S
SINUS: 3.29 CM
SODIUM SERPL-SCNC: 130 MMOL/L (ref 136–145)
STJ: 3.04 CM
TDI LATERAL: 0.11 M/S
TDI SEPTAL: 0.07 M/S
TDI: 0.09 M/S
TRICUSPID ANNULAR PLANE SYSTOLIC EXCURSION: 2.4 CM
WBC # BLD AUTO: 21.78 K/UL (ref 3.9–12.7)
Z-SCORE OF LEFT VENTRICULAR DIMENSION IN END DIASTOLE: -4.71
Z-SCORE OF LEFT VENTRICULAR DIMENSION IN END SYSTOLE: -2.87

## 2024-09-09 PROCEDURE — 63600175 PHARM REV CODE 636 W HCPCS: Performed by: NURSE PRACTITIONER

## 2024-09-09 PROCEDURE — 97605 NEG PRS WND THER DME<=50SQCM: CPT | Mod: ,,, | Performed by: ORTHOPAEDIC SURGERY

## 2024-09-09 PROCEDURE — 64447 NJX AA&/STRD FEMORAL NRV IMG: CPT

## 2024-09-09 PROCEDURE — 63600175 PHARM REV CODE 636 W HCPCS: Performed by: STUDENT IN AN ORGANIZED HEALTH CARE EDUCATION/TRAINING PROGRAM

## 2024-09-09 PROCEDURE — 37000009 HC ANESTHESIA EA ADD 15 MINS: Performed by: ORTHOPAEDIC SURGERY

## 2024-09-09 PROCEDURE — 27620 EXPLORE/TREAT ANKLE JOINT: CPT | Mod: 78,RT,, | Performed by: ORTHOPAEDIC SURGERY

## 2024-09-09 PROCEDURE — 63600175 PHARM REV CODE 636 W HCPCS: Mod: JZ,JG | Performed by: SURGERY

## 2024-09-09 PROCEDURE — 80053 COMPREHEN METABOLIC PANEL: CPT

## 2024-09-09 PROCEDURE — 21400001 HC TELEMETRY ROOM

## 2024-09-09 PROCEDURE — 36415 COLL VENOUS BLD VENIPUNCTURE: CPT

## 2024-09-09 PROCEDURE — 63600175 PHARM REV CODE 636 W HCPCS: Performed by: SURGERY

## 2024-09-09 PROCEDURE — 25000003 PHARM REV CODE 250: Performed by: ANESTHESIOLOGY

## 2024-09-09 PROCEDURE — 3E1U38Z IRRIGATION OF JOINTS USING IRRIGATING SUBSTANCE, PERCUTANEOUS APPROACH: ICD-10-PCS | Performed by: ORTHOPAEDIC SURGERY

## 2024-09-09 PROCEDURE — 37000008 HC ANESTHESIA 1ST 15 MINUTES: Performed by: ORTHOPAEDIC SURGERY

## 2024-09-09 PROCEDURE — 2W1SX6Z COMPRESSION OF RIGHT FOOT USING PRESSURE DRESSING: ICD-10-PCS | Performed by: ORTHOPAEDIC SURGERY

## 2024-09-09 PROCEDURE — 25000003 PHARM REV CODE 250: Performed by: NURSE PRACTITIONER

## 2024-09-09 PROCEDURE — 64450 NJX AA&/STRD OTHER PN/BRANCH: CPT | Mod: 59,RT,, | Performed by: SURGERY

## 2024-09-09 PROCEDURE — 25500020 PHARM REV CODE 255: Performed by: INTERNAL MEDICINE

## 2024-09-09 PROCEDURE — 25000003 PHARM REV CODE 250: Performed by: NURSE ANESTHETIST, CERTIFIED REGISTERED

## 2024-09-09 PROCEDURE — 63600175 PHARM REV CODE 636 W HCPCS

## 2024-09-09 PROCEDURE — 63600175 PHARM REV CODE 636 W HCPCS: Performed by: NURSE ANESTHETIST, CERTIFIED REGISTERED

## 2024-09-09 PROCEDURE — 71000033 HC RECOVERY, INTIAL HOUR: Performed by: ORTHOPAEDIC SURGERY

## 2024-09-09 PROCEDURE — 82962 GLUCOSE BLOOD TEST: CPT | Performed by: ORTHOPAEDIC SURGERY

## 2024-09-09 PROCEDURE — 25000003 PHARM REV CODE 250

## 2024-09-09 PROCEDURE — 64445 NJX AA&/STRD SCIATIC NRV IMG: CPT

## 2024-09-09 PROCEDURE — 83735 ASSAY OF MAGNESIUM: CPT

## 2024-09-09 PROCEDURE — 84100 ASSAY OF PHOSPHORUS: CPT

## 2024-09-09 PROCEDURE — 99232 SBSQ HOSP IP/OBS MODERATE 35: CPT | Mod: ,,, | Performed by: NURSE PRACTITIONER

## 2024-09-09 PROCEDURE — 63600175 PHARM REV CODE 636 W HCPCS: Performed by: ORTHOPAEDIC SURGERY

## 2024-09-09 PROCEDURE — 85027 COMPLETE CBC AUTOMATED: CPT

## 2024-09-09 PROCEDURE — 27201423 OPTIME MED/SURG SUP & DEVICES STERILE SUPPLY: Performed by: ORTHOPAEDIC SURGERY

## 2024-09-09 PROCEDURE — 0JBQ0ZZ EXCISION OF RIGHT FOOT SUBCUTANEOUS TISSUE AND FASCIA, OPEN APPROACH: ICD-10-PCS | Performed by: ORTHOPAEDIC SURGERY

## 2024-09-09 PROCEDURE — 25000003 PHARM REV CODE 250: Performed by: STUDENT IN AN ORGANIZED HEALTH CARE EDUCATION/TRAINING PROGRAM

## 2024-09-09 PROCEDURE — 71000015 HC POSTOP RECOV 1ST HR: Performed by: ORTHOPAEDIC SURGERY

## 2024-09-09 PROCEDURE — 36000706: Performed by: ORTHOPAEDIC SURGERY

## 2024-09-09 PROCEDURE — 76942 ECHO GUIDE FOR BIOPSY: CPT | Mod: 26,,, | Performed by: SURGERY

## 2024-09-09 PROCEDURE — 36000707: Performed by: ORTHOPAEDIC SURGERY

## 2024-09-09 RX ORDER — BUPIVACAINE HYDROCHLORIDE 5 MG/ML
INJECTION, SOLUTION EPIDURAL; INTRACAUDAL
Status: COMPLETED | OUTPATIENT
Start: 2024-09-09 | End: 2024-09-09

## 2024-09-09 RX ORDER — SODIUM CHLORIDE 9 MG/ML
INJECTION, SOLUTION INTRAVENOUS CONTINUOUS
Status: DISCONTINUED | OUTPATIENT
Start: 2024-09-09 | End: 2024-09-11

## 2024-09-09 RX ORDER — CEFAZOLIN SODIUM 1 G/3ML
INJECTION, POWDER, FOR SOLUTION INTRAMUSCULAR; INTRAVENOUS
Status: DISCONTINUED | OUTPATIENT
Start: 2024-09-09 | End: 2024-09-09

## 2024-09-09 RX ORDER — GLUCAGON 1 MG
1 KIT INJECTION
Status: DISCONTINUED | OUTPATIENT
Start: 2024-09-09 | End: 2024-09-09 | Stop reason: HOSPADM

## 2024-09-09 RX ORDER — CLINDAMYCIN PHOSPHATE 900 MG/50ML
900 INJECTION, SOLUTION INTRAVENOUS
Status: DISCONTINUED | OUTPATIENT
Start: 2024-09-09 | End: 2024-09-09 | Stop reason: HOSPADM

## 2024-09-09 RX ORDER — LIDOCAINE HYDROCHLORIDE 20 MG/ML
INJECTION INTRAVENOUS
Status: DISCONTINUED | OUTPATIENT
Start: 2024-09-09 | End: 2024-09-09

## 2024-09-09 RX ORDER — DEXMEDETOMIDINE HYDROCHLORIDE 100 UG/ML
INJECTION, SOLUTION INTRAVENOUS
Status: DISCONTINUED | OUTPATIENT
Start: 2024-09-09 | End: 2024-09-09

## 2024-09-09 RX ORDER — MUPIROCIN 20 MG/G
OINTMENT TOPICAL
Status: DISCONTINUED | OUTPATIENT
Start: 2024-09-09 | End: 2024-09-09 | Stop reason: HOSPADM

## 2024-09-09 RX ORDER — ONDANSETRON HYDROCHLORIDE 2 MG/ML
INJECTION, SOLUTION INTRAVENOUS
Status: DISCONTINUED | OUTPATIENT
Start: 2024-09-09 | End: 2024-09-09

## 2024-09-09 RX ORDER — SODIUM CHLORIDE 0.9 % (FLUSH) 0.9 %
10 SYRINGE (ML) INJECTION
Status: DISCONTINUED | OUTPATIENT
Start: 2024-09-09 | End: 2024-09-09 | Stop reason: HOSPADM

## 2024-09-09 RX ORDER — ACETAMINOPHEN 10 MG/ML
1000 INJECTION, SOLUTION INTRAVENOUS ONCE
Status: COMPLETED | OUTPATIENT
Start: 2024-09-09 | End: 2024-09-09

## 2024-09-09 RX ORDER — MIDAZOLAM HYDROCHLORIDE 1 MG/ML
.5-4 INJECTION, SOLUTION INTRAMUSCULAR; INTRAVENOUS
Status: DISCONTINUED | OUTPATIENT
Start: 2024-09-09 | End: 2024-09-09 | Stop reason: HOSPADM

## 2024-09-09 RX ORDER — MORPHINE SULFATE 2 MG/ML
2 INJECTION, SOLUTION INTRAMUSCULAR; INTRAVENOUS ONCE
Status: DISCONTINUED | OUTPATIENT
Start: 2024-09-09 | End: 2024-09-19

## 2024-09-09 RX ORDER — GUAIFENESIN 600 MG/1
600 TABLET, EXTENDED RELEASE ORAL 2 TIMES DAILY
Status: DISCONTINUED | OUTPATIENT
Start: 2024-09-09 | End: 2024-09-13

## 2024-09-09 RX ORDER — FENTANYL CITRATE 50 UG/ML
25-200 INJECTION, SOLUTION INTRAMUSCULAR; INTRAVENOUS
Status: DISCONTINUED | OUTPATIENT
Start: 2024-09-09 | End: 2024-09-09 | Stop reason: HOSPADM

## 2024-09-09 RX ORDER — FENTANYL CITRATE 50 UG/ML
25 INJECTION, SOLUTION INTRAMUSCULAR; INTRAVENOUS EVERY 5 MIN PRN
Status: DISCONTINUED | OUTPATIENT
Start: 2024-09-09 | End: 2024-09-09 | Stop reason: HOSPADM

## 2024-09-09 RX ORDER — VANCOMYCIN HYDROCHLORIDE 1 G/20ML
INJECTION, POWDER, LYOPHILIZED, FOR SOLUTION INTRAVENOUS
Status: DISCONTINUED | OUTPATIENT
Start: 2024-09-09 | End: 2024-09-09 | Stop reason: HOSPADM

## 2024-09-09 RX ORDER — KETAMINE HCL IN 0.9 % NACL 50 MG/5 ML
SYRINGE (ML) INTRAVENOUS
Status: DISCONTINUED | OUTPATIENT
Start: 2024-09-09 | End: 2024-09-09

## 2024-09-09 RX ORDER — IPRATROPIUM BROMIDE AND ALBUTEROL SULFATE 2.5; .5 MG/3ML; MG/3ML
3 SOLUTION RESPIRATORY (INHALATION) EVERY 4 HOURS PRN
Status: DISCONTINUED | OUTPATIENT
Start: 2024-09-09 | End: 2024-10-04 | Stop reason: HOSPADM

## 2024-09-09 RX ORDER — SODIUM CHLORIDE 0.9 % (FLUSH) 0.9 %
3 SYRINGE (ML) INJECTION
Status: DISCONTINUED | OUTPATIENT
Start: 2024-09-09 | End: 2024-09-09 | Stop reason: HOSPADM

## 2024-09-09 RX ORDER — PROPOFOL 10 MG/ML
VIAL (ML) INTRAVENOUS
Status: DISCONTINUED | OUTPATIENT
Start: 2024-09-09 | End: 2024-09-09

## 2024-09-09 RX ADMIN — FENTANYL CITRATE 100 MCG: 50 INJECTION, SOLUTION INTRAMUSCULAR; INTRAVENOUS at 02:09

## 2024-09-09 RX ADMIN — LIDOCAINE HYDROCHLORIDE 40 MG: 20 INJECTION INTRAVENOUS at 04:09

## 2024-09-09 RX ADMIN — PROPOFOL 40 MCG/KG/MIN: 10 INJECTION, EMULSION INTRAVENOUS at 04:09

## 2024-09-09 RX ADMIN — ATORVASTATIN CALCIUM 40 MG: 40 TABLET, FILM COATED ORAL at 09:09

## 2024-09-09 RX ADMIN — GUAIFENESIN 600 MG: 600 TABLET, EXTENDED RELEASE ORAL at 09:09

## 2024-09-09 RX ADMIN — Medication 10 MG: at 04:09

## 2024-09-09 RX ADMIN — IOHEXOL 75 ML: 350 INJECTION, SOLUTION INTRAVENOUS at 04:09

## 2024-09-09 RX ADMIN — EMTRICITABINE AND TENOFOVIR DISOPROXIL FUMARATE 1 TABLET: 200; 300 TABLET, FILM COATED ORAL at 09:09

## 2024-09-09 RX ADMIN — CEFAZOLIN 2 G: 330 INJECTION, POWDER, FOR SOLUTION INTRAMUSCULAR; INTRAVENOUS at 04:09

## 2024-09-09 RX ADMIN — OXACILLIN 12 G: 2 INJECTION, POWDER, FOR SOLUTION INTRAMUSCULAR; INTRAVENOUS at 10:09

## 2024-09-09 RX ADMIN — DEXMEDETOMIDINE 8 MCG: 100 INJECTION, SOLUTION, CONCENTRATE INTRAVENOUS at 04:09

## 2024-09-09 RX ADMIN — MIDAZOLAM 1 MG: 1 INJECTION INTRAMUSCULAR; INTRAVENOUS at 02:09

## 2024-09-09 RX ADMIN — ACETAMINOPHEN 1000 MG: 10 INJECTION, SOLUTION INTRAVENOUS at 02:09

## 2024-09-09 RX ADMIN — INSULIN ASPART 6 UNITS: 100 INJECTION, SOLUTION INTRAVENOUS; SUBCUTANEOUS at 08:09

## 2024-09-09 RX ADMIN — CEFAZOLIN 2 G: 2 INJECTION, POWDER, FOR SOLUTION INTRAMUSCULAR; INTRAVENOUS at 05:09

## 2024-09-09 RX ADMIN — ONDANSETRON 4 MG: 2 INJECTION INTRAMUSCULAR; INTRAVENOUS at 05:09

## 2024-09-09 RX ADMIN — GLYCOPYRROLATE 0.2 MG: 0.2 INJECTION, SOLUTION INTRAMUSCULAR; INTRAVENOUS at 04:09

## 2024-09-09 RX ADMIN — INSULIN GLARGINE 45 UNITS: 100 INJECTION, SOLUTION SUBCUTANEOUS at 08:09

## 2024-09-09 RX ADMIN — INSULIN ASPART 3 UNITS: 100 INJECTION, SOLUTION INTRAVENOUS; SUBCUTANEOUS at 10:09

## 2024-09-09 RX ADMIN — BUPIVACAINE HYDROCHLORIDE 30 ML: 5 INJECTION, SOLUTION EPIDURAL; INTRACAUDAL; PERINEURAL at 02:09

## 2024-09-09 RX ADMIN — NORTRIPTYLINE HYDROCHLORIDE 50 MG: 25 CAPSULE ORAL at 09:09

## 2024-09-09 RX ADMIN — BUPIVACAINE HYDROCHLORIDE 20 ML: 5 INJECTION, SOLUTION EPIDURAL; INTRACAUDAL at 02:09

## 2024-09-09 RX ADMIN — SODIUM CHLORIDE: 9 INJECTION, SOLUTION INTRAVENOUS at 06:09

## 2024-09-09 RX ADMIN — GABAPENTIN 600 MG: 300 CAPSULE ORAL at 09:09

## 2024-09-09 RX ADMIN — ACETAMINOPHEN 1000 MG: 500 TABLET ORAL at 09:09

## 2024-09-09 RX ADMIN — GABAPENTIN 1200 MG: 400 CAPSULE ORAL at 09:09

## 2024-09-09 RX ADMIN — DEXMEDETOMIDINE 4 MCG: 100 INJECTION, SOLUTION, CONCENTRATE INTRAVENOUS at 04:09

## 2024-09-09 RX ADMIN — MORPHINE SULFATE 2 MG: 2 INJECTION, SOLUTION INTRAMUSCULAR; INTRAVENOUS at 10:09

## 2024-09-09 RX ADMIN — SODIUM CHLORIDE: 0.9 INJECTION, SOLUTION INTRAVENOUS at 04:09

## 2024-09-09 NOTE — SUBJECTIVE & OBJECTIVE
Interval History: Afebrile this morning, but now temp 100.6. HR low 100s. Satting well on 2L NC. Now with small DEEPALI (Cr 1.2 >> 1.6) and worsening transaminitis. Seen this morning. Reports 9/10 pain to R ankle and fatigue. NPO for I&D with ortho today.     Review of Systems   Constitutional:  Positive for activity change, chills, fatigue and fever.   Respiratory:  Positive for cough and shortness of breath. Negative for chest tightness.    Cardiovascular:  Negative for chest pain and leg swelling.   Gastrointestinal:  Negative for abdominal pain and nausea.   Musculoskeletal:  Positive for arthralgias.   Skin:  Positive for color change and wound.   Neurological:  Negative for dizziness and weakness.     Objective:     Vital Signs (Most Recent):  Temp: (!) 100.6 °F (38.1 °C) (09/09/24 1233)  Pulse: 108 (09/09/24 1233)  Resp: 20 (09/09/24 1233)  BP: 134/62 (09/09/24 1233)  SpO2: 97 % (09/09/24 1233) Vital Signs (24h Range):  Temp:  [97.9 °F (36.6 °C)-100.6 °F (38.1 °C)] 100.6 °F (38.1 °C)  Pulse:  [] 108  Resp:  [17-28] 20  SpO2:  [95 %-100 %] 97 %  BP: (102-144)/(56-70) 134/62     Weight: 93 kg (205 lb 0.4 oz)  Body mass index is 34.12 kg/m².    Intake/Output Summary (Last 24 hours) at 9/9/2024 1248  Last data filed at 9/9/2024 0535  Gross per 24 hour   Intake 300 ml   Output 101 ml   Net 199 ml         Physical Exam  Vitals and nursing note reviewed.   Constitutional:       Appearance: He is well-developed. He is obese. He is ill-appearing.   Eyes:      Pupils: Pupils are equal, round, and reactive to light.   Cardiovascular:      Rate and Rhythm: Regular rhythm. Tachycardia present.   Pulmonary:      Effort: Pulmonary effort is normal.      Breath sounds: Rales present.      Comments: On 2L NC. Cough with deep inspiration  Abdominal:      Palpations: Abdomen is soft.      Tenderness: There is no abdominal tenderness.   Musculoskeletal:         General: No tenderness.   Skin:     General: Skin is warm and dry.       Comments: C/d/I dressing to R ankle with wound vac    Neurological:      Mental Status: He is alert and oriented to person, place, and time.   Psychiatric:         Behavior: Behavior normal.             Significant Labs: All pertinent labs within the past 24 hours have been reviewed.  CBC:   Recent Labs   Lab 09/08/24  0536 09/09/24  0252   WBC 21.23* 21.78*   HGB 9.8* 9.9*   HCT 29.3* 31.8*    498*     CMP:   Recent Labs   Lab 09/08/24 0536 09/09/24  0252   * 130*   K 3.9 3.7    98   CO2 20* 21*   * 165*   BUN 16 18   CREATININE 1.2 1.6*   CALCIUM 8.6* 8.9   PROT 5.8* 6.4   ALBUMIN 1.7* 1.7*   BILITOT 0.7 0.5   ALKPHOS 253* 375*   AST 79* 155*   ALT 47* 59*   ANIONGAP 8 11       Significant Imaging: I have reviewed all pertinent imaging results/findings within the past 24 hours.

## 2024-09-09 NOTE — PROGRESS NOTES
Preop complete. Miguel MG notified that patient has elevated temp, tachycardic, and has productive cough. Surgery team notified that surgical consent is incomplete and cannot do block until fixed.

## 2024-09-09 NOTE — ASSESSMENT & PLAN NOTE
Endocrinology consulted for BG management.   BG goal 140-180    At this time, recommend that the patient remain off of his pump since he cannot be controlled in the Auto mode. Patient does not interact with pump frequently and relies on the auto mode algorithm.     WBD 0.8 units/kg/day  - Lantus (Insulin Glargine) 45 units nightly   - Novolog (Insulin Aspart) 14 units TIDWM (Hold if NPO) and prn for BG excursions [facility] SSI (150/18)  - BG checks AC/HS/0200  - Hypoglycemia protocol in place    ** Please notify Endocrine for any change and/or advance in diet**  ** Please call Endocrine for any BG related issues **    Discharge Planning:   TBD. Please notify endocrinology prior to discharge.

## 2024-09-09 NOTE — PROGRESS NOTES
[facility]  Orthopedics  Progress Note    Attg Note:  Patient seen and examined.  I agree with the resident's assessment and plan.  Patient with increasing fever and tachycardia.  Obtained CTA chest and reviewed with Radiology prior to procedure.  No evidence PE.  To OR for repeat debridement and irrigation and evaluation by Plastic surgery.  On control diabetic with peripheral neuropathy status post ORIF trimalleolar ankle fracture with tissue necrosis and MSSA.    Moe Liz MD      Patient Name: Cortes Schroeder  MRN: [id number]  Admission Date: 9/6/2024  Hospital Length of Stay: 3 days  Attending Provider: Filemon Miner MD  Primary Care Provider: Andrew Sinclair Jr., MD  Follow-up For: Procedure(s) (LRB):  APPLICATION, WOUND VAC (Right)  IRRIGATION AND DEBRIDEMENT (Right)    Post-Operative Day: 3 Days Post-Op  Subjective:     Principal Problem:Surgical wound breakdown    Principal Orthopedic Problem: s/p I&D and wound vac placement on 09/06    Interval History:   Overnight events: no issues. NPO since midnight. Updated patient on plan for repeat I&D today. Remains alert and oriented x4 and has complete understanding.     Vitals: Tachy overnight max 112 otherwise normotensive on RA. ID recs for Ancef q6h. WBC trending up over the weekend.     PT/OT update: NWB RLE with wound vac to suction    Need to know: To OR today for repeat I&D with vac change. Remain strictly NPO.           Review of patient's allergies indicates:   Allergen Reactions    Invokana [canagliflozin] Anaphylaxis    Percocet [oxycodone-acetaminophen] Nausea Only and Hallucinations    Biaxin [clarithromycin]     Hydrocodone Other (See Comments)     Dizzy/nausea/hallucinations    Sulfa (sulfonamide antibiotics) Nausea Only and Rash       Current Facility-Administered Medications   Medication    acetaminophen tablet 1,000 mg    atorvastatin tablet 40 mg    ceFAZolin 2 g in D5W 50 mL IVPB (MB+)    dextrose 10% bolus 125 mL 125 mL     "dextrose 10% bolus 125 mL 125 mL    dextrose 10% bolus 250 mL 250 mL    dextrose 10% bolus 250 mL 250 mL    emtricitabine-tenofovir 200-300 mg per tablet 1 tablet    enoxaparin injection 40 mg    gabapentin capsule 600 mg    And    gabapentin capsule 1,200 mg    glucagon (human recombinant) injection 1 mg    glucose chewable tablet 16 g    glucose chewable tablet 24 g    insulin aspart U-100 pen 0-15 Units    insulin aspart U-100 pen 14 Units    insulin glargine U-100 (Lantus) pen 45 Units    morphine injection 2 mg    morphine injection 4 mg    nortriptyline capsule 50 mg     Objective:     Vital Signs (Most Recent):  Temp: 98.4 °F (36.9 °C) (09/09/24 0400)  Pulse: 110 (09/09/24 0425)  Resp: 17 (09/09/24 0400)  BP: 135/63 (09/09/24 0400)  SpO2: 98 % (09/09/24 0400) Vital Signs (24h Range):  Temp:  [97.9 °F (36.6 °C)-102.9 °F (39.4 °C)] 98.4 °F (36.9 °C)  Pulse:  [] 110  Resp:  [16-18] 17  SpO2:  [95 %-100 %] 98 %  BP: ()/(51-70) 135/63     Weight: 93 kg (205 lb 0.4 oz)  Height: 5' 5" (165.1 cm)  Body mass index is 34.12 kg/m².      Intake/Output Summary (Last 24 hours) at 9/9/2024 0555  Last data filed at 9/9/2024 0535  Gross per 24 hour   Intake 300 ml   Output 101 ml   Net 199 ml        Ortho/SPM Exam     AAOx4  NAD  Tachycardic  No increased WOB    RLE    Dressing C/D/I   Wound vac to good suction  Compartments soft/compressible  Reduced sensation to the dorsum of toe  Active toe dorsiflexion & plantarflexion  WWP. Brisk cap refill      Significant Labs:   Recent Labs   Lab 09/09/24  0252   WBC 21.78*   RBC 3.60*   HGB 9.9*   HCT 31.8*   *   MCV 88   MCH 27.5   MCHC 31.1*       Significant Imaging: I have reviewed and interpreted all pertinent imaging results/findings.  Assessment/Plan:     * Surgical wound breakdown  Cortes Schroeder is a 64 y.o. male with PMH of DM, HTN  and right trimalleolar ankle fracture status post ex fix on 07/18 with subsequent definitive fixation on 07/26 admitted " with right ankle wound dehiscence in the setting of uncontrolled diabetes.      He is s/p I&D of R ankle 09/06. To OR today for repeat I&D and vac exchange.     VS:  tachy overnight  Labs:  WBC trending up over weekend  Diet: NPO for surgery today   Pain control: multimodal regimen  PT/OT:  NWB RLE   DVT PPx:  held in prep for OR   Abx:  Ancef q6h; ID following   Cultures:  ankle cx staph +      Dispo: To OR today                 Artur Galvan MD  Orthopedics  [facility] - Surgery

## 2024-09-09 NOTE — SUBJECTIVE & OBJECTIVE
Principal Problem:Surgical wound breakdown    Principal Orthopedic Problem: s/p I&D and wound vac placement on 09/06    Interval History:   Overnight events: no issues. NPO since midnight. Updated patient on plan for repeat I&D today. Remains alert and oriented x4 and has complete understanding.     Vitals: Tachy overnight max 112 otherwise normotensive on RA. ID recs for Ancef q6h. WBC trending up over the weekend.     PT/OT update: NWB RLE with wound vac to suction    Need to know: To OR today for repeat I&D with vac change. Remain strictly NPO.           Review of patient's allergies indicates:   Allergen Reactions    Invokana [canagliflozin] Anaphylaxis    Percocet [oxycodone-acetaminophen] Nausea Only and Hallucinations    Biaxin [clarithromycin]     Hydrocodone Other (See Comments)     Dizzy/nausea/hallucinations    Sulfa (sulfonamide antibiotics) Nausea Only and Rash       Current Facility-Administered Medications   Medication    acetaminophen tablet 1,000 mg    atorvastatin tablet 40 mg    ceFAZolin 2 g in D5W 50 mL IVPB (MB+)    dextrose 10% bolus 125 mL 125 mL    dextrose 10% bolus 125 mL 125 mL    dextrose 10% bolus 250 mL 250 mL    dextrose 10% bolus 250 mL 250 mL    emtricitabine-tenofovir 200-300 mg per tablet 1 tablet    enoxaparin injection 40 mg    gabapentin capsule 600 mg    And    gabapentin capsule 1,200 mg    glucagon (human recombinant) injection 1 mg    glucose chewable tablet 16 g    glucose chewable tablet 24 g    insulin aspart U-100 pen 0-15 Units    insulin aspart U-100 pen 14 Units    insulin glargine U-100 (Lantus) pen 45 Units    morphine injection 2 mg    morphine injection 4 mg    nortriptyline capsule 50 mg     Objective:     Vital Signs (Most Recent):  Temp: 98.4 °F (36.9 °C) (09/09/24 0400)  Pulse: 110 (09/09/24 0425)  Resp: 17 (09/09/24 0400)  BP: 135/63 (09/09/24 0400)  SpO2: 98 % (09/09/24 0400) Vital Signs (24h Range):  Temp:  [97.9 °F (36.6 °C)-102.9 °F (39.4 °C)] 98.4 °F  "(36.9 °C)  Pulse:  [] 110  Resp:  [16-18] 17  SpO2:  [95 %-100 %] 98 %  BP: ()/(51-70) 135/63     Weight: 93 kg (205 lb 0.4 oz)  Height: 5' 5" (165.1 cm)  Body mass index is 34.12 kg/m².      Intake/Output Summary (Last 24 hours) at 9/9/2024 0595  Last data filed at 9/9/2024 0535  Gross per 24 hour   Intake 300 ml   Output 101 ml   Net 199 ml        Ortho/SPM Exam     AAOx4  NAD  Tachycardic  No increased WOB    RLE    Dressing C/D/I   Wound vac to good suction  Compartments soft/compressible  Reduced sensation to the dorsum of toe  Active toe dorsiflexion & plantarflexion  WWP. Brisk cap refill      Significant Labs:   Recent Labs   Lab 09/09/24  0252   WBC 21.78*   RBC 3.60*   HGB 9.9*   HCT 31.8*   *   MCV 88   MCH 27.5   MCHC 31.1*       Significant Imaging: I have reviewed and interpreted all pertinent imaging results/findings.  "

## 2024-09-09 NOTE — OP NOTE
OP NOTE    DOS:  09/09/2024    Preop Dx: Tissue necrosis and infection status post ORIF right trimalleolar ankle fracture status post debridement and irrigation and wound VAC placement x1    Postop Dx: Tissue necrosis and infection status post ORIF right trimalleolar ankle fracture status post debridement and irrigation and wound VAC placement x1    Procedure: Excisional and non excisional debridement and irrigation right medial ankle wound skin and subcutaneous tissue and fascia 5 sq cm - [id number]    Arthrotomy right ankle with irrigation     Surgeon: Moe Liz M.D.    Asst:  Artur Galvan M.D    Anesthesia: Mac plus regional    EBL:  Minimal    IVF:  500 cc crystalloid    Implants: None    Specimens: None    Findings: No distinct purulence.  Some necrotic fascia and necrotic skin and subcutaneous tissue in the posterior aspect of the wound.  Anterior fascia and capsule with some necrosis necessitating arthrotomy with irrigation of ankle joint.    Dispo:  To PACU awake/stable       Indications for Procedure:      64-year-old male sustained a right trimalleolar ankle fracture which was treated with open reduction internal fixation after external fixation.  On presentation he had a hemoglobin A1c of 10 with known diabetic peripheral neuropathy.  Patient has had subsequent tissue breakdown around the area of the medial and lateral incisions.  I took him to the operating room for debridement and irrigation with purulence in the area but no distention of the ankle joint.  He has subsequently grown MSSA.  I am taking him back for another debridement irrigation of the medial side.  The risks, benefits and alternatives to surgery were discussed the patient prior to going the operating room.  Informed consent was obtained.    Procedure in Detail:    Patient was identified in preoperative holding area and the site was obvious.  Regional analgesia was administered.  Patient was wheeled to the operating room placed on the  operating table in supine position.  Right lower extremity was cleansed with chlorhexidine and alcohol with a wound VAC still in place.  Right lower extremity was prepped and draped in sterile fashion.  The foot was prepped 1st and then elevated in the wound VAC was taken off after the leg was in the air.  A time-out was undertaken to confirm patient, side, site, surgery, surgeon and administration of preoperative antibiotics.  All agreed we proceeded.      The patient had pedunculated and unhealthy looking subcutaneous tissue and some periosteum which was excised with a 10 blade.  There was a paddle of necrosis of full-thickness skin and subcutaneous tissue in the posterior aspect of the wound which was excised with a 10 blade for about 5 sq cm.    The wound was copiously irrigated with normal saline solution followed by dilute Betadine solution, again normal saline solution and then Dakin solution, followed by normal saline solution and then dilute peroxide.     Looking at the tissue overlying the anteromedial ankle joint this is also unhealthy and at this point I excised a portion of this tissue with an arthrotomy of the ankle joint.  There was not a rush of any purulence from the ankle joint but I will have to assume this is colonized given the tissue of the anteromedial surface    I then copiously irrigated the ankle joint with normal saline solution times several L. at this point plastic surgery came into the room and after visualizing the wound felt that free flap coverage may be necessitated.    Wounds again copiously irrigated normal saline solution and the bone covered with a white wound VAC sponge followed by a black wound VAC sponge and then the obey pad which gained good suction seal.  The lateral incision was again covered with a Adaptic and then black sponge with hydrocolloid surrounding this for an incisional wound VAC with good suction seal.  Patient was then placed into a posterior splint.    All  instrument and sponge counts were reported correct at the end of the case.  There were no complications.  The patient was awakened and taken to the recovery room in stable condition.      Plan the patient:     This will be difficult case with his diabetic peripheral neuropathy and infection with wound necrosis.  We will have to obtain a CTA of the lower extremity to evaluate his vasculature in the area and see with plastics if he is a candidate for free flap.    Moe Liz MD

## 2024-09-09 NOTE — PLAN OF CARE
Problem: Adult Inpatient Plan of Care  Goal: Plan of Care Review  Outcome: Progressing  Goal: Patient-Specific Goal (Individualized)  Outcome: Progressing  Goal: Absence of Hospital-Acquired Illness or Injury  Outcome: Progressing  Goal: Optimal Comfort and Wellbeing  Outcome: Progressing  Goal: Readiness for Transition of Care  Outcome: Progressing     Problem: Fall Injury Risk  Goal: Absence of Fall and Fall-Related Injury  Outcome: Progressing     Problem: Sepsis/Septic Shock  Goal: Optimal Coping  Outcome: Progressing     Pt is progressing towards plan of care goals. Pt is NPO due to upcoming procedure. Pt's pain and fever assessed though out shift. PRN medications effective.  Explained plan of care to pt, pt  verbalized understanding. Fall precautions in place ,no report of falls.

## 2024-09-09 NOTE — ASSESSMENT & PLAN NOTE
Titrate insulin slowly to avoid hypoglycemia as the risk of hypoglycemia increases with decreased creatinine clearance.  Estimated Creatinine Clearance: 49.5 mL/min (A) (based on SCr of 1.6 mg/dL (H)).

## 2024-09-09 NOTE — ASSESSMENT & PLAN NOTE
Patient with Hypoxic Respiratory failure which is Acute.  he is not on home oxygen. Supplemental oxygen was provided and noted-      .   Signs/symptoms of respiratory failure include- tachypnea and lethargy. Contributing diagnoses includes - Obesity Hypoventilation Labs and images were reviewed. Patient Has not had a recent ABG. Will treat underlying causes and adjust management of respiratory failure as follows-   - currently on 2L NC, will wean O2 as tolerated  - repeat CXR without acute process  - prn duo james

## 2024-09-09 NOTE — ASSESSMENT & PLAN NOTE
- liver enzymes uptrending likely 2/2 severe infection  - will hold truvada for now  - monitor with daily CMP

## 2024-09-09 NOTE — ANESTHESIA POSTPROCEDURE EVALUATION
Anesthesia Post Evaluation    Patient: Cortes Schroeder    Procedure(s) Performed: Procedure(s) (LRB):  IRRIGATION AND DEBRIDEMENT, LOWER EXTREMITY - WITH WOUND VAC CHANGE, RIGHT ANKLE (Right)  ARTHROTOMY, ANKLE (Right)    Final Anesthesia Type: general      Patient location during evaluation: PACU  Patient participation: Yes- Able to Participate  Level of consciousness: awake and alert  Post-procedure vital signs: reviewed and stable  Pain management: adequate  Airway patency: patent    PONV status at discharge: No PONV  Anesthetic complications: no      Cardiovascular status: blood pressure returned to baseline and hemodynamically stable  Respiratory status: unassisted  Hydration status: euvolemic  Follow-up not needed.              Vitals Value Taken Time   /58 09/09/24 1830   Temp 36.4 °C (97.5 °F) 09/09/24 1830   Pulse 85 09/09/24 1830   Resp 24 09/09/24 1830   SpO2 100 % 09/09/24 1830         Event Time   Out of Recovery 09/09/2024 18:00:00         Pain/Judy Score: Pain Rating Prior to Med Admin: 0 (9/9/2024  2:53 PM)  Pain Rating Post Med Admin: 0 (9/8/2024 10:09 PM)  Judy Score: 9 (9/9/2024  5:45 PM)

## 2024-09-09 NOTE — NURSING TRANSFER
Nursing Transfer Note      9/9/2024   6:21 PM    Nurse giving handoff:Sofia PRITCHARD PACU   Nurse receiving handoff:Krystal PRITCHARD POSS    Reason patient is being transferred: s/p anesthesia recovery    Transfer To: returning to room 542    Transfer via bed    Transfer with 3L to O2, cardiac monitoring, iv pole and wound vac    Transported by PACU PCT x2    Transfer Vital Signs: see flowsheets    Telemetry: Rate 83 and Rhythm NSR  Order for Tele Monitor? Yes    Additional Lines: Oxygen and Suction    Medicines sent: none    Any special needs or follow-up needed: none    Patient belongings transferred with patient: No    Chart send with patient: Yes    Patient reassessed at: 9/9/24 at 1800 (date, time)

## 2024-09-09 NOTE — ANESTHESIA PROCEDURE NOTES
Right Saphenous SS    Patient location during procedure: pre-op   Block not for primary anesthetic.  Reason for block: at surgeon's request and post-op pain management   Post-op Pain Location: Right leg pain   Start time: 9/9/2024 2:46 PM  Timeout: 9/9/2024 2:45 PM   End time: 9/9/2024 2:52 PM    Staffing  Authorizing Provider: Andrew Paiz MD  Performing Provider: Bernarda Caro MD    Staffing  Performed by: Bernarda Caro MD  Authorized by: Andrew Paiz MD    Preanesthetic Checklist  Completed: patient identified, IV checked, site marked, risks and benefits discussed, surgical consent, monitors and equipment checked, pre-op evaluation and timeout performed  Peripheral Block  Patient position: supine  Prep: ChloraPrep  Patient monitoring: heart rate, cardiac monitor, continuous pulse ox, continuous capnometry and frequent blood pressure checks  Block type: saphenous  Laterality: right  Injection technique: single shot  Needle  Needle type: Echogenic   Needle gauge: 20 G  Needle length: 4 in  Needle localization: anatomical landmarks and ultrasound guidance   -ultrasound image captured on disc.  Assessment  Injection assessment: negative aspiration, negative parasthesia and local visualized surrounding nerve  Paresthesia pain: none  Heart rate change: no  Slow fractionated injection: yes  Pain Tolerance: comfortable throughout block  Medications:    Medications: bupivacaine (pf) (MARCAINE) injection 0.5% - Perineural, Other   20 mL - 9/9/2024 2:52:00 PM    Additional Notes  VSS.  [facility] RN monitoring vitals throughout procedure.  Patient tolerated procedure well. Administered 20cc of 0.5% bupivacaine with 1:300k epi as local anesthetic.

## 2024-09-09 NOTE — ASSESSMENT & PLAN NOTE
This patient does have evidence of infective focus  My overall impression is sepsis.  Source: Skin and Soft Tissue (location ankle)  Antibiotics given-   Antibiotics (72h ago, onward)    Start     Stop Route Frequency Ordered    09/09/24 1330  ceFAZolin 2 g in D5W 50 mL IVPB (MB+)         -- IV Every 8 hours (non-standard times) 09/09/24 0723        Latest lactate reviewed-  Recent Labs   Lab 09/08/24  1641   LACTATE 1.7       Organ dysfunction indicated by Acute kidney injury and Encephalopathy    Fluid challenge Actual Body weight- Patient will receive 30ml/kg actual body weight to calculate fluid bolus for treatment of septic shock.     Post- resuscitation assessment No - Post resuscitation assessment not needed     Will Not start Pressors-   Source control achieved by: see above

## 2024-09-09 NOTE — ANESTHESIA POSTPROCEDURE EVALUATION
Anesthesia Post Evaluation    Patient: Cortes Schroeder    Procedure(s) Performed: Procedure(s) (LRB):  APPLICATION, WOUND VAC (Right)  IRRIGATION AND DEBRIDEMENT (Right)    Final Anesthesia Type: general      Patient location during evaluation: PACU  Patient participation: Yes- Able to Participate  Level of consciousness: awake and alert and oriented  Post-procedure vital signs: reviewed and stable  Pain management: adequate  Airway patency: patent    PONV status at discharge: No PONV  Anesthetic complications: no      Cardiovascular status: hemodynamically stable  Respiratory status: unassisted and spontaneous ventilation  Hydration status: euvolemic  Follow-up not needed.          Vitals Value Taken Time   /63 09/09/24 0400   Temp 36.9 °C (98.4 °F) 09/09/24 0400   Pulse 110 09/09/24 0425   Resp 17 09/09/24 0400   SpO2 98 % 09/09/24 0400         Event Time   Out of Recovery 20:03:00         Pain/Judy Score: Pain Rating Prior to Med Admin: 7 (9/8/2024  9:17 PM)  Pain Rating Post Med Admin: 0 (9/8/2024 10:09 PM)

## 2024-09-09 NOTE — ASSESSMENT & PLAN NOTE
Cortes Schroeder is a 64 y.o. male with PMH of DM, HTN  and right trimalleolar ankle fracture status post ex fix on 07/18 with subsequent definitive fixation on 07/26 admitted with right ankle wound dehiscence in the setting of uncontrolled diabetes.      He is s/p I&D of R ankle 09/06. To OR today for repeat I&D and vac exchange.     VS:  tachy overnight  Labs:  WBC trending up over weekend  Diet: NPO for surgery today   Pain control: multimodal regimen  PT/OT:  NWB RLE   DVT PPx:  held in prep for OR   Abx:  Ancef q6h; ID following   Cultures:  ankle cx staph +      Dispo: To OR today

## 2024-09-09 NOTE — ASSESSMENT & PLAN NOTE
DEEPALI is likely due to pre-renal azotemia due to dehydration. Baseline creatinine is  1 . Most recent creatinine and eGFR are listed below.  Recent Labs     09/07/24  0519 09/08/24  0536 09/09/24  0252   CREATININE 1.2 1.2 1.6*   EGFRNORACEVR >60.0 >60.0 48.1*        Plan  - DEEPALI is worsening. Will adjust treatment as follows: give additional fluids overnight  - Avoid nephrotoxins and renally dose meds for GFR listed above  - Monitor urine output, serial BMP, and adjust therapy as needed

## 2024-09-09 NOTE — TRANSFER OF CARE
"Anesthesia Transfer of Care Note    Patient: Cortes Schroeder    Procedure(s) Performed: Procedure(s) (LRB):  IRRIGATION AND DEBRIDEMENT, LOWER EXTREMITY - WITH WOUND VAC CHANGE, RIGHT ANKLE (Right)  ARTHROTOMY, ANKLE (Right)    Patient location: PACU    Anesthesia Type: general    Transport from OR: Transported from OR on 6-10 L/min O2 by face mask with adequate spontaneous ventilation    Post pain: adequate analgesia    Post assessment: no apparent anesthetic complications and tolerated procedure well    Post vital signs: stable    Level of consciousness: awake and alert    Nausea/Vomiting: no nausea/vomiting    Complications: none    Transfer of care protocol was followed      Last vitals: Visit Vitals  /66 (BP Location: Right arm, Patient Position: Lying)   Pulse 90   Temp 36.3 °C (97.3 °F) (Temporal)   Resp (!) 22   Ht 5' 5" (1.651 m)   Wt 93 kg (205 lb 0.4 oz)   SpO2 100%   BMI 34.12 kg/m²     "

## 2024-09-09 NOTE — ANESTHESIA PROCEDURE NOTES
Right Popliteal SS    Patient location during procedure: pre-op   Block not for primary anesthetic.  Reason for block: at surgeon's request and post-op pain management   Post-op Pain Location: Right leg pain   Start time: 9/9/2024 2:36 PM  Timeout: 9/9/2024 2:35 PM   End time: 9/9/2024 2:43 PM    Staffing  Authorizing Provider: Andrew Paiz MD  Performing Provider: Bernarda Caro MD    Staffing  Performed by: Bernarda Caro MD  Authorized by: Andrew Paiz MD    Preanesthetic Checklist  Completed: patient identified, IV checked, site marked, risks and benefits discussed, surgical consent, monitors and equipment checked, pre-op evaluation and timeout performed  Peripheral Block  Patient position: supine  Prep: ChloraPrep  Patient monitoring: heart rate, cardiac monitor, continuous pulse ox, continuous capnometry and frequent blood pressure checks  Block type: popliteal  Laterality: right  Injection technique: single shot  Needle  Needle type: Echogenic   Needle gauge: 20 G  Needle length: 4 in  Needle localization: anatomical landmarks and ultrasound guidance   -ultrasound image captured on disc.  Assessment  Injection assessment: negative aspiration, negative parasthesia and local visualized surrounding nerve  Paresthesia pain: none  Heart rate change: no  Slow fractionated injection: yes  Pain Tolerance: comfortable throughout block  Medications:    Medications: bupivacaine (pf) (MARCAINE) injection 0.5% - Perineural, Right Popliteal   30 mL - 9/9/2024 2:43:00 PM    Additional Notes  VSS.  [facility] RN monitoring vitals throughout procedure.  Patient tolerated procedure well. Administered 30cc of 0.5% bupivacaine with 1:300k epi for local anesthetic.

## 2024-09-09 NOTE — SUBJECTIVE & OBJECTIVE
"Interval HPI:   Overnight events: No acute events overnight. Patient in room 542/542 A. Blood glucose stable. BG at and above goal on current insulin regimen (SSI, prandial, and basal insulin ). Steroid use- None. 3 Days Post-Op  Renal function- Abnormal - Creatinine 1.6   Vasopressors-  None       Endocrine will continue to follow and manage insulin orders inpatient.         Diet NPO     Eating:   NPO  Nausea: No  Hypoglycemia and intervention: No  Fever: No  TPN and/or TF: No    /61   Pulse 100   Temp 99.8 °F (37.7 °C)   Resp (!) 28   Ht 5' 5" (1.651 m)   Wt 93 kg (205 lb 0.4 oz)   SpO2 96%   BMI 34.12 kg/m²     Labs Reviewed and Include    Recent Labs   Lab 09/09/24  0252   *   CALCIUM 8.9   ALBUMIN 1.7*   PROT 6.4   *   K 3.7   CO2 21*   CL 98   BUN 18   CREATININE 1.6*   ALKPHOS 375*   ALT 59*   *   BILITOT 0.5     Lab Results   Component Value Date    WBC 21.78 (H) 09/09/2024    HGB 9.9 (L) 09/09/2024    HCT 31.8 (L) 09/09/2024    MCV 88 09/09/2024     (H) 09/09/2024     No results for input(s): "TSH", "FREET4" in the last 168 hours.  Lab Results   Component Value Date    HGBA1C 8.5 (H) 09/06/2024       Nutritional status:   Body mass index is 34.12 kg/m².  Lab Results   Component Value Date    ALBUMIN 1.7 (L) 09/09/2024    ALBUMIN 1.7 (L) 09/08/2024    ALBUMIN 2.4 (L) 09/06/2024     Lab Results   Component Value Date    PREALBUMIN 3 (L) 09/06/2024    PREALBUMIN 13 (L) 07/18/2024       Estimated Creatinine Clearance: 49.5 mL/min (A) (based on SCr of 1.6 mg/dL (H)).    Accu-Checks  Recent Labs     09/06/24  2141 09/07/24  0859 09/07/24  1119 09/07/24  1544 09/07/24  2102 09/08/24  0746 09/08/24  1137 09/08/24  1538 09/08/24  2128 09/09/24  0730   POCTGLUCOSE 283* 186* 185* 189* 80 145* 90 159* 204* 235*       Current Medications and/or Treatments Impacting Glycemic Control  Immunotherapy:    Immunosuppressants       None          Steroids:   Hormones (From admission, " onward)      None          Pressors:    Autonomic Drugs (From admission, onward)      None          Hyperglycemia/Diabetes Medications:   Antihyperglycemics (From admission, onward)      Start     Stop Route Frequency Ordered    09/08/24 0936  insulin glargine U-100 (Lantus) pen 45 Units         -- SubQ Daily 09/08/24 0937    09/07/24 0715  insulin aspart U-100 pen 14 Units         -- SubQ 3 times daily with meals 09/06/24 1858 09/06/24 1957  insulin aspart U-100 pen 0-15 Units         -- SubQ Before meals, nightly and at 0200 PRN 09/06/24 1858

## 2024-09-09 NOTE — ASSESSMENT & PLAN NOTE
Closed trimalleolar fracture of right ankle s/p ORIF in July  65 yo M presenting with AMS and worsening redness, swelling and pain to R ankle.     - continue IV vanc and cefepime  - Ortho consulted:   - taken to OR 09/06 for debridement of surgical wound with wound vac placed   - To OR today for repeat I&D and vac exchange.   - follow wound cultures -- Staph aureus and GBS  - follow blood cultures -- Staph aureus.   - ID following:  - Continue Iv-cefazolin 2g Q6h.  - Will f/u repeat bld cxs 09/08 to ensure cleared  - Will f/u surgical cxs and adjust abx accordingly  - Recommend TTE  - Anticipate Iv abx duration of 6wks s/p 09/09, DON 10/21.  - Once bld cxs remain cleared for 72hrs, will consider adding rifampin for improved penetration of hardware biofilm, considering retention of involved underling hardware.   - Anticipate abx suppression following Iv abx treatment course.  - Of note, Pt continuing on Truvada during admission as PrEP since ~2018, per PCP. Continue to monitor kidney and liver function.

## 2024-09-09 NOTE — ASSESSMENT & PLAN NOTE
Patient's FSGs are not controlled on current hypoglycemics.   Last A1c reviewed-   Lab Results   Component Value Date    LABA1C 10.8 (H) 08/15/2016    HGBA1C 8.5 (H) 09/06/2024     Most recent fingerstick glucose reviewed-   Recent Labs   Lab 09/08/24  1538 09/08/24  2128 09/09/24  0730   POCTGLUCOSE 159* 204* 235*       Current correctional scale  Low  Maintain anti-hyperglycemic dose as follows-   Antihyperglycemics (From admission, onward)    Start     Stop Route Frequency Ordered    09/08/24 0936  insulin glargine U-100 (Lantus) pen 45 Units         -- SubQ Daily 09/08/24 0937    09/07/24 0715  insulin aspart U-100 pen 14 Units         -- SubQ 3 times daily with meals 09/06/24 1858 09/06/24 1957  insulin aspart U-100 pen 0-15 Units         -- SubQ Before meals, nightly and at 0200 PRN 09/06/24 1858         - Endocrine consulted:  - At this time, recommend that the patient remain off of his pump since he cannot be controlled in the Auto mode. Patient does not interact with pump frequently and relies on the auto mode algorithm.   - Lantus (Insulin Glargine) 40 units nightly   - Novolog (Insulin Aspart) 14 units TIDWM and prn for BG excursions MDC SSI (150/25)  - hold oral antihyperglycemics during hospitalization   - needs better BG control for optimal wound healing

## 2024-09-09 NOTE — PROGRESS NOTES
"[facility] - [facility]  Endocrinology  Progress Note    Admit Date: 9/6/2024     Reason for Consult: Management of T2DM, Hyperglycemia      Surgical Procedure and Date: S/P irrigation debridement of RLE on 09/06/2024    Diabetes diagnosis year: 1995     Home Diabetes Medications:    Humalog via OmniPod insulin pump  Mounjaro 10 mg weekly     Current pump settings:  Basal 12 am to 2.3 and 6 am to 1.55  ICR to 3 at 12 am, 2 at 4 pm  ISF to 1:20   AIT 3 hrs  Target 110-120        Patient had anaphylaxis reaction to SGLT2 inhibitors specifically Invokana     How often checking glucose at home? Dexcom G6   BG readings on regimen: Average 210's per Dexcom  Hypoglycemia on the regimen?  Yes  Missed doses on regimen?  No     Diabetes Complications include:     Hyperglycemia and Diabetic peripheral neuropathy         Complicating diabetes co morbidities:   HLD, HTN, Obesity         HPI: 64 y.o. male presents to the ED w/ complaint of altered mental status. Hx of R ankle fracture with surgery over a month ago. Patient now presents with sepsis 2/2 R ankle infection s/p ORIF on 07/26. Started on IV vanc and cefepime. Given IVFs. Blood cultures pending. Brought to OR with ortho on 09/06 for irrigation debridement of RLE. Endocrine following for BG and type 2 diabetes.     Lab Results   Component Value Date    LABA1C 10.8 (H) 08/15/2016    HGBA1C 8.5 (H) 09/06/2024         Interval HPI:   Overnight events: No acute events overnight. Patient in room 542/542 A. Blood glucose stable. BG at and above goal on current insulin regimen (SSI, prandial, and basal insulin ). Steroid use- None. 3 Days Post-Op  Renal function- Abnormal - Creatinine 1.6   Vasopressors-  None       Endocrine will continue to follow and manage insulin orders inpatient.         Diet NPO     Eating:   NPO  Nausea: No  Hypoglycemia and intervention: No  Fever: No  TPN and/or TF: No    /61   Pulse 100   Temp 99.8 °F (37.7 °C)   Resp (!) 28   Ht 5' 5" (1.651 m) " "  Wt 93 kg (205 lb 0.4 oz)   SpO2 96%   BMI 34.12 kg/m²     Labs Reviewed and Include    Recent Labs   Lab 09/09/24  0252   *   CALCIUM 8.9   ALBUMIN 1.7*   PROT 6.4   *   K 3.7   CO2 21*   CL 98   BUN 18   CREATININE 1.6*   ALKPHOS 375*   ALT 59*   *   BILITOT 0.5     Lab Results   Component Value Date    WBC 21.78 (H) 09/09/2024    HGB 9.9 (L) 09/09/2024    HCT 31.8 (L) 09/09/2024    MCV 88 09/09/2024     (H) 09/09/2024     No results for input(s): "TSH", "FREET4" in the last 168 hours.  Lab Results   Component Value Date    HGBA1C 8.5 (H) 09/06/2024       Nutritional status:   Body mass index is 34.12 kg/m².  Lab Results   Component Value Date    ALBUMIN 1.7 (L) 09/09/2024    ALBUMIN 1.7 (L) 09/08/2024    ALBUMIN 2.4 (L) 09/06/2024     Lab Results   Component Value Date    PREALBUMIN 3 (L) 09/06/2024    PREALBUMIN 13 (L) 07/18/2024       Estimated Creatinine Clearance: 49.5 mL/min (A) (based on SCr of 1.6 mg/dL (H)).    Accu-Checks  Recent Labs     09/06/24  2141 09/07/24  0859 09/07/24  1119 09/07/24  1544 09/07/24  2102 09/08/24  0746 09/08/24  1137 09/08/24  1538 09/08/24  2128 09/09/24  0730   POCTGLUCOSE 283* 186* 185* 189* 80 145* 90 159* 204* 235*       Current Medications and/or Treatments Impacting Glycemic Control  Immunotherapy:    Immunosuppressants       None          Steroids:   Hormones (From admission, onward)      None          Pressors:    Autonomic Drugs (From admission, onward)      None          Hyperglycemia/Diabetes Medications:   Antihyperglycemics (From admission, onward)      Start     Stop Route Frequency Ordered    09/08/24 0936  insulin glargine U-100 (Lantus) pen 45 Units         -- SubQ Daily 09/08/24 0937    09/07/24 0715  insulin aspart U-100 pen 14 Units         -- SubQ 3 times daily with meals 09/06/24 1858 09/06/24 1957  insulin aspart U-100 pen 0-15 Units         -- SubQ Before meals, nightly and at 0200 PRN 09/06/24 1858            ASSESSMENT " and PLAN    Renal/  DEEPALI (acute kidney injury)  Titrate insulin slowly to avoid hypoglycemia as the risk of hypoglycemia increases with decreased creatinine clearance.  Estimated Creatinine Clearance: 49.5 mL/min (A) (based on SCr of 1.6 mg/dL (H)).        Endocrine  Uncontrolled diabetes mellitus with hyperglycemia  Endocrinology consulted for BG management.   BG goal 140-180    At this time, recommend that the patient remain off of his pump since he cannot be controlled in the Auto mode. Patient does not interact with pump frequently and relies on the auto mode algorithm.     WBD 0.8 units/kg/day  - Lantus (Insulin Glargine) 45 units nightly   - Novolog (Insulin Aspart) 14 units TIDWM (Hold if NPO) and prn for BG excursions HDC SSI (150/18)  - BG checks AC/HS/0200  - Hypoglycemia protocol in place    ** Please notify Endocrine for any change and/or advance in diet**  ** Please call Endocrine for any BG related issues **    Discharge Planning:   TBD. Please notify endocrinology prior to discharge.        Orthopedic  * Surgical wound breakdown  Optimize BG control to improve wound healing             Brandyn Malin, DNP, FNP  Endocrinology  [facility] - Surgery

## 2024-09-09 NOTE — PROGRESS NOTES
Pharmacist Renal Dose Adjustment Note    Cortes Schroeder is a 64 y.o. male being treated with the medication cefazolin.    Patient Data:    Vital Signs (Most Recent):  Temp: 98.4 °F (36.9 °C) (09/09/24 0400)  Pulse: 110 (09/09/24 0425)  Resp: 17 (09/09/24 0400)  BP: 135/63 (09/09/24 0400)  SpO2: 98 % (09/09/24 0400) Vital Signs (72h Range):  Temp:  [97.4 °F (36.3 °C)-102.9 °F (39.4 °C)]   Pulse:  []   Resp:  [12-33]   BP: ()/(44-83)   SpO2:  [90 %-100 %]      Recent Labs   Lab 09/07/24  0519 09/08/24  0536 09/09/24  0252   CREATININE 1.2 1.2 1.6*     Serum creatinine: 1.6 mg/dL (H) 09/09/24 0252  Estimated creatinine clearance: 49.5 mL/min (A)    Medication:cefazolin dose: 2 g frequency every 6 hours will be changed to medication:cefazolin dose:2 g frequency:every 8 hours.    Pharmacist's Name: Josh Braswell  Pharmacist's Extension: [phone]

## 2024-09-09 NOTE — NURSING
Nurses Note -- 4 Eyes      9/8/2024   7:36 PM      Skin assessed during: Q Shift Change      [x] No Altered Skin Integrity Present    []Prevention Measures Documented      [] Yes- Altered Skin Integrity Present or Discovered   [] LDA Added if Not in Epic (Describe Wound)   [] New Altered Skin Integrity was Present on Admit and Documented in LDA   [] Wound Image Taken    Wound Care Consulted? No    Attending Nurse:  Lovely WINN    Second RN/Staff Member:  Shabnam PRITCHARD

## 2024-09-09 NOTE — PLAN OF CARE
ID Note:  Attempted X 2 to see patient.  Off floor in OR for repeat washout and then CT  Persistent fevers to 103, WBC 21.   Reported cough.  CXR 9/7 without consolidation/effusion  Blood cultures 9/6, 9/7 + for MSSA   2D echo negative for vegetation  Surgical cultures of 9/6 + for MSSA and GBS  Liver enzymes and creatinine uptrending     Plan/recommendations:   Discontinue cefazolin and start IV oxacillin 12 g q 24 hours as a continuous infusion   Trend liver enzymes    Monitor renal function    Follow repeat blood cultures   5.    Will follow up tomorrow    Data reviewed and plan discussed with ID staff, Dr. Randhawa  Secure chat/Discussed above plan with Primary Team,  YARELIS Garg    .

## 2024-09-09 NOTE — PROGRESS NOTES
[facility] ([facility])  [facility] Medicine  Progress Note    Patient Name: Cortes Schroeder  MRN: [id number]  Patient Class: IP- Inpatient   Admission Date: 9/6/2024  Length of Stay: 3 days  Attending Physician: Filemon Miner MD  Primary Care Provider: Andrew Sinclair Jr., MD        Subjective:     Principal Problem:Surgical wound breakdown        HPI:  Cortes Schroeder is a 64 y.o. M with PMHx of anxiety, T2DM, GERD, HLD, HTN who presented to ED for AMS. He had a fall in July that resulted in R trimalleolar fracture and L distal radius fracture. He had external fixation on 07/18 and then ORIF on 07/26 with Dr. Liz. He was prescribed clinda 300 mg on 08/28 for a ten day course. Patient was doing well when he acutely became altered and not acting like himself a few hours ago. Patient family member drove him here from [facility] for ortho consult. R medial ankle wound dehisced with redness and swelling around it. No CPM fever, cough, N/V/D or seizure.    In ED: T max 99.1. SIRS 2/4 with WBC 18 and . CMP notable for Na 127, Cr 1.7, . Phos 1.9. .3. BNP 73. Trop neg. A1c 8.5. R tib fib XR notes There are 2 abandoned anterior-posteriorly oriented pin tracks in the right mid tibial shaft.  On AP views, each of these pin tracks demonstrates a small faint internal density, possibly representing a sequestrum. Ortho and endocrine consulted. Given IV vanc and IV clindamycin. Given 2.7L IVFs. Admitted to hospital medicine for sepsis 2/2 infected hardware.     Overview/Hospital Course:  Cortes Schroeder is a 63 yo M admitted to hospital medicine for sepsis 2/2 R ankle infection s/p ORIF on 07/26. Started on IV vanc and cefepime. Given IVFs. Brought to OR with ortho on 09/06 for irrigation debridement of RLE. Endocrine following for BG. Was on vanc and zosyn.Echo pending. Blood cultures with Staph aureus. 2/2 repeat blood cultures with growth. Wound cultures with Staph aureus and Group B  strep. Will follow cultures. ID consulted as suspect patient will need long course of abx; now changing to cefazolin.  Ortho taking back to the OR today for repeat I&D.      Interval History: Afebrile this morning, but now temp 100.6. HR low 100s. Satting well on 2L NC. Now with small DEEPALI (Cr 1.2 >> 1.6) and worsening transaminitis. Seen this morning. Reports 9/10 pain to R ankle and fatigue. NPO for I&D with ortho today.     Review of Systems   Constitutional:  Positive for activity change, chills, fatigue and fever.   Respiratory:  Positive for cough and shortness of breath. Negative for chest tightness.    Cardiovascular:  Negative for chest pain and leg swelling.   Gastrointestinal:  Negative for abdominal pain and nausea.   Musculoskeletal:  Positive for arthralgias.   Skin:  Positive for color change and wound.   Neurological:  Negative for dizziness and weakness.     Objective:     Vital Signs (Most Recent):  Temp: (!) 100.6 °F (38.1 °C) (09/09/24 1233)  Pulse: 108 (09/09/24 1233)  Resp: 20 (09/09/24 1233)  BP: 134/62 (09/09/24 1233)  SpO2: 97 % (09/09/24 1233) Vital Signs (24h Range):  Temp:  [97.9 °F (36.6 °C)-100.6 °F (38.1 °C)] 100.6 °F (38.1 °C)  Pulse:  [] 108  Resp:  [17-28] 20  SpO2:  [95 %-100 %] 97 %  BP: (102-144)/(56-70) 134/62     Weight: 93 kg (205 lb 0.4 oz)  Body mass index is 34.12 kg/m².    Intake/Output Summary (Last 24 hours) at 9/9/2024 1248  Last data filed at 9/9/2024 0535  Gross per 24 hour   Intake 300 ml   Output 101 ml   Net 199 ml         Physical Exam  Vitals and nursing note reviewed.   Constitutional:       Appearance: He is well-developed. He is obese. He is ill-appearing.   Eyes:      Pupils: Pupils are equal, round, and reactive to light.   Cardiovascular:      Rate and Rhythm: Regular rhythm. Tachycardia present.   Pulmonary:      Effort: Pulmonary effort is normal.      Breath sounds: Rales present.      Comments: On 2L NC. Cough with deep inspiration  Abdominal:       Palpations: Abdomen is soft.      Tenderness: There is no abdominal tenderness.   Musculoskeletal:         General: No tenderness.   Skin:     General: Skin is warm and dry.      Comments: C/d/I dressing to R ankle with wound vac    Neurological:      Mental Status: He is alert and oriented to person, place, and time.   Psychiatric:         Behavior: Behavior normal.             Significant Labs: All pertinent labs within the past 24 hours have been reviewed.  CBC:   Recent Labs   Lab 09/08/24  0536 09/09/24  0252   WBC 21.23* 21.78*   HGB 9.8* 9.9*   HCT 29.3* 31.8*    498*     CMP:   Recent Labs   Lab 09/08/24  0536 09/09/24  0252   * 130*   K 3.9 3.7    98   CO2 20* 21*   * 165*   BUN 16 18   CREATININE 1.2 1.6*   CALCIUM 8.6* 8.9   PROT 5.8* 6.4   ALBUMIN 1.7* 1.7*   BILITOT 0.7 0.5   ALKPHOS 253* 375*   AST 79* 155*   ALT 47* 59*   ANIONGAP 8 11       Significant Imaging: I have reviewed all pertinent imaging results/findings within the past 24 hours.    Assessment/Plan:      * Surgical wound breakdown  Closed trimalleolar fracture of right ankle s/p ORIF in July  65 yo M presenting with AMS and worsening redness, swelling and pain to R ankle.     - continue IV vanc and cefepime  - Ortho consulted:   - taken to OR 09/06 for debridement of surgical wound with wound vac placed   - To OR today for repeat I&D and vac exchange.   - follow wound cultures -- Staph aureus and GBS  - follow blood cultures -- Staph aureus.   - ID following:  - Continue Iv-cefazolin 2g Q6h.  - Will f/u repeat bld cxs 09/08 to ensure cleared  - Will f/u surgical cxs and adjust abx accordingly  - Recommend TTE  - Anticipate Iv abx duration of 6wks s/p 09/09, DON 10/21.  - Once bld cxs remain cleared for 72hrs, will consider adding rifampin for improved penetration of hardware biofilm, considering retention of involved underling hardware.   - Anticipate abx suppression following Iv abx treatment course.  - Of note,  Pt continuing on Truvada during admission as PrEP since ~2018, per PCP. Continue to monitor kidney and liver function.     Bacteremia due to Gram-positive bacteria  - 2/2 blood cx with Staph aureus. 1/2 repeats positive. Repeats pending   - Follow repeat blood cx  - ID following: start cefazolin  - echo pending     Sepsis  This patient does have evidence of infective focus  My overall impression is sepsis.  Source: Skin and Soft Tissue (location ankle)  Antibiotics given-   Antibiotics (72h ago, onward)      Start     Stop Route Frequency Ordered    09/09/24 1330  ceFAZolin 2 g in D5W 50 mL IVPB (MB+)         -- IV Every 8 hours (non-standard times) 09/09/24 0723          Latest lactate reviewed-  Recent Labs   Lab 09/08/24  1641   LACTATE 1.7       Organ dysfunction indicated by Acute kidney injury and Encephalopathy    Fluid challenge Actual Body weight- Patient will receive 30ml/kg actual body weight to calculate fluid bolus for treatment of septic shock.     Post- resuscitation assessment No - Post resuscitation assessment not needed     Will Not start Pressors-   Source control achieved by: see above    Transaminitis  - liver enzymes uptrending likely 2/2 severe infection  - will hold truvada for now  - monitor with daily CMP    On pre-exposure prophylaxis for HIV  - holding truvada for transaminitis     Hyponatremia  Hyponatremia is likely due to Dehydration/hypovolemia. The patient's most recent sodium results are listed below.  Recent Labs     09/07/24  0519 09/08/24  0536 09/09/24  0252   * 132* 130*       Plan  - Correct the sodium by 4-6mEq in 24 hours.   - Obtain the following studies: Urine sodium, urine osmolality, serum osmolality.  - Will treat the hyponatremia with IV fluids as follows: NS  - Monitor sodium Daily.   - Patient hyponatremia is improving    Uncontrolled diabetes mellitus with hyperglycemia  Patient's FSGs are not controlled on current hypoglycemics.   Last A1c reviewed-   Lab  Results   Component Value Date    LABA1C 10.8 (H) 08/15/2016    HGBA1C 8.5 (H) 09/06/2024     Most recent fingerstick glucose reviewed-   Recent Labs   Lab 09/08/24  1538 09/08/24  2128 09/09/24  0730   POCTGLUCOSE 159* 204* 235*       Current correctional scale  Low  Maintain anti-hyperglycemic dose as follows-   Antihyperglycemics (From admission, onward)      Start     Stop Route Frequency Ordered    09/08/24 0936  insulin glargine U-100 (Lantus) pen 45 Units         -- SubQ Daily 09/08/24 0937    09/07/24 0715  insulin aspart U-100 pen 14 Units         -- SubQ 3 times daily with meals 09/06/24 1858 09/06/24 1957  insulin aspart U-100 pen 0-15 Units         -- SubQ Before meals, nightly and at 0200 PRN 09/06/24 1858           - Endocrine consulted:  - At this time, recommend that the patient remain off of his pump since he cannot be controlled in the Auto mode. Patient does not interact with pump frequently and relies on the auto mode algorithm.   - Lantus (Insulin Glargine) 40 units nightly   - Novolog (Insulin Aspart) 14 units TIDWM and prn for BG excursions MDC SSI (150/25)  - hold oral antihyperglycemics during hospitalization   - needs better BG control for optimal wound healing     Closed trimalleolar fracture of right ankle  S/p ORIF 07/2024  Ortho consulted   - see surgical wound breakdown above    DEEPALI (acute kidney injury)  DEEPALI is likely due to pre-renal azotemia due to dehydration. Baseline creatinine is  1 . Most recent creatinine and eGFR are listed below.  Recent Labs     09/07/24  0519 09/08/24  0536 09/09/24  0252   CREATININE 1.2 1.2 1.6*   EGFRNORACEVR >60.0 >60.0 48.1*        Plan  - DEEPALI is worsening. Will adjust treatment as follows: give additional fluids overnight  - Avoid nephrotoxins and renally dose meds for GFR listed above  - Monitor urine output, serial BMP, and adjust therapy as needed    Acute hypoxemic respiratory failure  Patient with Hypoxic Respiratory failure which is Acute.   "he is not on home oxygen. Supplemental oxygen was provided and noted-      .   Signs/symptoms of respiratory failure include- tachypnea and lethargy. Contributing diagnoses includes - Obesity Hypoventilation Labs and images were reviewed. Patient Has not had a recent ABG. Will treat underlying causes and adjust management of respiratory failure as follows-   - currently on 2L NC, will wean O2 as tolerated  - repeat CXR without acute process  - prn duo nebs     Uncontrolled type 2 diabetes mellitus with diabetic polyneuropathy, with long-term current use of insulin  Patient's FSGs are uncontrolled due to hyperglycemia on current medication regimen.  Last A1c reviewed-   Lab Results   Component Value Date    LABA1C 10.8 (H) 08/15/2016    HGBA1C 9.9 (H) 07/18/2024     Most recent fingerstick glucose reviewed- No results for input(s): "POCTGLUCOSE" in the last 24 hours.  Current correctional scale  Low  Maintain anti-hyperglycemic dose as follows-   Antihyperglycemics (From admission, onward)      Start     Stop Route Frequency Ordered    09/06/24 0506  insulin aspart U-100 pen 0-5 Units         -- SubQ Every 6 hours PRN 09/06/24 0406          Hold Oral hypoglycemics while patient is in the hospital.      VTE Risk Mitigation (From admission, onward)           Ordered     IP VTE HIGH RISK PATIENT  Once         09/06/24 1735     Place sequential compression device  Until discontinued         09/06/24 1249                    Discharge Planning   JAYLEEN: 9/11/2024     Code Status: Full Code   Is the patient medically ready for discharge?:     Reason for patient still in hospital (select all that apply): Patient trending condition, Treatment, Consult recommendations, and Other (specify) I&D today  Discharge Plan A: Home Health                  Jennifer Ochoa PA-C  Department of Hospital Medicine   [facility] - Surgery ([facility])    "

## 2024-09-09 NOTE — ASSESSMENT & PLAN NOTE
Hyponatremia is likely due to Dehydration/hypovolemia. The patient's most recent sodium results are listed below.  Recent Labs     09/07/24  0519 09/08/24  0536 09/09/24  0252   * 132* 130*       Plan  - Correct the sodium by 4-6mEq in 24 hours.   - Obtain the following studies: Urine sodium, urine osmolality, serum osmolality.  - Will treat the hyponatremia with IV fluids as follows: NS  - Monitor sodium Daily.   - Patient hyponatremia is improving

## 2024-09-09 NOTE — PLAN OF CARE
Problem: Adult Inpatient Plan of Care  Goal: Plan of Care Review  Outcome: Progressing  Goal: Patient-Specific Goal (Individualized)  Outcome: Progressing  Goal: Absence of Hospital-Acquired Illness or Injury  Outcome: Progressing  Goal: Optimal Comfort and Wellbeing  Outcome: Progressing  Goal: Readiness for Transition of Care  Outcome: Progressing     Problem: Fall Injury Risk  Goal: Absence of Fall and Fall-Related Injury  Outcome: Progressing     Problem: Sepsis/Septic Shock  Goal: Optimal Coping  Outcome: Progressing  Goal: Absence of Bleeding  Outcome: Progressing  Goal: Blood Glucose Level Within Targeted Range  Outcome: Progressing  Goal: Absence of Infection Signs and Symptoms  Outcome: Progressing  Goal: Optimal Nutrition Intake  Outcome: Progressing     Problem: Acute Kidney Injury/Impairment  Goal: Fluid and Electrolyte Balance  Outcome: Progressing  Goal: Improved Oral Intake  Outcome: Progressing  Goal: Effective Renal Function  Outcome: Progressing     Problem: Wound  Goal: Optimal Coping  Outcome: Progressing  Goal: Optimal Functional Ability  Outcome: Progressing  Goal: Absence of Infection Signs and Symptoms  Outcome: Progressing  Goal: Improved Oral Intake  Outcome: Progressing  Goal: Optimal Pain Control and Function  Outcome: Progressing  Goal: Skin Health and Integrity  Outcome: Progressing  Goal: Optimal Wound Healing  Outcome: Progressing     Problem: Diabetes Comorbidity  Goal: Blood Glucose Level Within Targeted Range  Outcome: Progressing     Problem: Skin Injury Risk Increased  Goal: Skin Health and Integrity  Outcome: Progressing    Call placed this am to notify team pt has cough, was able to notify team via epic chat , new orders written, IV site wnl, pt tolerated meds , waiting on resp tx still,  pt npo so insulin with meals held , see mar,  pt to procedure earlier, pt just came  back from procedure woundvac sealed and to suction , IVF's infusing without difficulty, cbg 171, vss no  distress

## 2024-09-10 LAB
ALBUMIN SERPL BCP-MCNC: 1.3 G/DL (ref 3.5–5.2)
ALP SERPL-CCNC: 314 U/L (ref 55–135)
ALT SERPL W/O P-5'-P-CCNC: 15 U/L (ref 10–44)
ANION GAP SERPL CALC-SCNC: 9 MMOL/L (ref 8–16)
AST SERPL-CCNC: 65 U/L (ref 10–40)
BACTERIA BLD CULT: ABNORMAL
BILIRUB SERPL-MCNC: 0.4 MG/DL (ref 0.1–1)
BUN SERPL-MCNC: 22 MG/DL (ref 8–23)
CALCIUM SERPL-MCNC: 8.1 MG/DL (ref 8.7–10.5)
CHLORIDE SERPL-SCNC: 102 MMOL/L (ref 95–110)
CO2 SERPL-SCNC: 21 MMOL/L (ref 23–29)
CREAT SERPL-MCNC: 1.6 MG/DL (ref 0.5–1.4)
ERYTHROCYTE [DISTWIDTH] IN BLOOD BY AUTOMATED COUNT: 15.6 % (ref 11.5–14.5)
EST. GFR  (NO RACE VARIABLE): 48.1 ML/MIN/1.73 M^2
GLUCOSE SERPL-MCNC: 191 MG/DL (ref 70–110)
HCT VFR BLD AUTO: 25.9 % (ref 40–54)
HGB BLD-MCNC: 8.4 G/DL (ref 14–18)
MAGNESIUM SERPL-MCNC: 2.2 MG/DL (ref 1.6–2.6)
MCH RBC QN AUTO: 28.1 PG (ref 27–31)
MCHC RBC AUTO-ENTMCNC: 32.4 G/DL (ref 32–36)
MCV RBC AUTO: 87 FL (ref 82–98)
PHOSPHATE SERPL-MCNC: 4.1 MG/DL (ref 2.7–4.5)
PLATELET # BLD AUTO: 443 K/UL (ref 150–450)
PMV BLD AUTO: 10.2 FL (ref 9.2–12.9)
POCT GLUCOSE: 153 MG/DL (ref 70–110)
POCT GLUCOSE: 203 MG/DL (ref 70–110)
POCT GLUCOSE: 205 MG/DL (ref 70–110)
POCT GLUCOSE: 212 MG/DL (ref 70–110)
POCT GLUCOSE: 254 MG/DL (ref 70–110)
POTASSIUM SERPL-SCNC: 3.9 MMOL/L (ref 3.5–5.1)
PROT SERPL-MCNC: 5.4 G/DL (ref 6–8.4)
RBC # BLD AUTO: 2.99 M/UL (ref 4.6–6.2)
SODIUM SERPL-SCNC: 132 MMOL/L (ref 136–145)
WBC # BLD AUTO: 20.1 K/UL (ref 3.9–12.7)

## 2024-09-10 PROCEDURE — 27000646 HC AEROBIKA DEVICE

## 2024-09-10 PROCEDURE — 84100 ASSAY OF PHOSPHORUS: CPT

## 2024-09-10 PROCEDURE — 63600175 PHARM REV CODE 636 W HCPCS: Performed by: STUDENT IN AN ORGANIZED HEALTH CARE EDUCATION/TRAINING PROGRAM

## 2024-09-10 PROCEDURE — 99233 SBSQ HOSP IP/OBS HIGH 50: CPT | Mod: ,,, | Performed by: NURSE PRACTITIONER

## 2024-09-10 PROCEDURE — 94664 DEMO&/EVAL PT USE INHALER: CPT

## 2024-09-10 PROCEDURE — 36415 COLL VENOUS BLD VENIPUNCTURE: CPT

## 2024-09-10 PROCEDURE — 63600175 PHARM REV CODE 636 W HCPCS

## 2024-09-10 PROCEDURE — 85027 COMPLETE CBC AUTOMATED: CPT

## 2024-09-10 PROCEDURE — 94640 AIRWAY INHALATION TREATMENT: CPT

## 2024-09-10 PROCEDURE — 99900035 HC TECH TIME PER 15 MIN (STAT)

## 2024-09-10 PROCEDURE — 25500020 PHARM REV CODE 255: Performed by: INTERNAL MEDICINE

## 2024-09-10 PROCEDURE — 63600175 PHARM REV CODE 636 W HCPCS: Performed by: NURSE PRACTITIONER

## 2024-09-10 PROCEDURE — 94761 N-INVAS EAR/PLS OXIMETRY MLT: CPT

## 2024-09-10 PROCEDURE — 21400001 HC TELEMETRY ROOM

## 2024-09-10 PROCEDURE — 25000242 PHARM REV CODE 250 ALT 637 W/ HCPCS

## 2024-09-10 PROCEDURE — 25000003 PHARM REV CODE 250

## 2024-09-10 PROCEDURE — 25000003 PHARM REV CODE 250: Performed by: NURSE PRACTITIONER

## 2024-09-10 PROCEDURE — 83735 ASSAY OF MAGNESIUM: CPT

## 2024-09-10 PROCEDURE — 87040 BLOOD CULTURE FOR BACTERIA: CPT | Performed by: NURSE PRACTITIONER

## 2024-09-10 PROCEDURE — 99232 SBSQ HOSP IP/OBS MODERATE 35: CPT | Mod: ,,,

## 2024-09-10 PROCEDURE — 36415 COLL VENOUS BLD VENIPUNCTURE: CPT | Performed by: NURSE PRACTITIONER

## 2024-09-10 PROCEDURE — 27000221 HC OXYGEN, UP TO 24 HOURS

## 2024-09-10 PROCEDURE — 80053 COMPREHEN METABOLIC PANEL: CPT

## 2024-09-10 RX ORDER — LEVOFLOXACIN 5 MG/ML
750 INJECTION, SOLUTION INTRAVENOUS
Status: DISCONTINUED | OUTPATIENT
Start: 2024-09-10 | End: 2024-09-11

## 2024-09-10 RX ORDER — INSULIN ASPART 100 [IU]/ML
16 INJECTION, SOLUTION INTRAVENOUS; SUBCUTANEOUS
Status: DISCONTINUED | OUTPATIENT
Start: 2024-09-10 | End: 2024-09-11

## 2024-09-10 RX ORDER — INSULIN GLARGINE 100 [IU]/ML
48 INJECTION, SOLUTION SUBCUTANEOUS DAILY
Status: DISCONTINUED | OUTPATIENT
Start: 2024-09-11 | End: 2024-09-10

## 2024-09-10 RX ORDER — INSULIN GLARGINE 100 [IU]/ML
50 INJECTION, SOLUTION SUBCUTANEOUS DAILY
Status: DISCONTINUED | OUTPATIENT
Start: 2024-09-11 | End: 2024-09-12

## 2024-09-10 RX ORDER — POLYETHYLENE GLYCOL 3350 17 G/17G
17 POWDER, FOR SOLUTION ORAL DAILY
Status: DISCONTINUED | OUTPATIENT
Start: 2024-09-10 | End: 2024-09-12

## 2024-09-10 RX ORDER — SODIUM CHLORIDE FOR INHALATION 3 %
4 VIAL, NEBULIZER (ML) INHALATION 2 TIMES DAILY
Status: COMPLETED | OUTPATIENT
Start: 2024-09-10 | End: 2024-09-12

## 2024-09-10 RX ORDER — ENOXAPARIN SODIUM 100 MG/ML
40 INJECTION SUBCUTANEOUS EVERY 24 HOURS
Status: DISCONTINUED | OUTPATIENT
Start: 2024-09-10 | End: 2024-09-13

## 2024-09-10 RX ADMIN — INSULIN ASPART 6 UNITS: 100 INJECTION, SOLUTION INTRAVENOUS; SUBCUTANEOUS at 10:09

## 2024-09-10 RX ADMIN — SODIUM CHLORIDE: 9 INJECTION, SOLUTION INTRAVENOUS at 10:09

## 2024-09-10 RX ADMIN — INSULIN ASPART 16 UNITS: 100 INJECTION, SOLUTION INTRAVENOUS; SUBCUTANEOUS at 04:09

## 2024-09-10 RX ADMIN — GABAPENTIN 1200 MG: 400 CAPSULE ORAL at 08:09

## 2024-09-10 RX ADMIN — ACETAMINOPHEN 1000 MG: 500 TABLET ORAL at 06:09

## 2024-09-10 RX ADMIN — GUAIFENESIN 600 MG: 600 TABLET, EXTENDED RELEASE ORAL at 08:09

## 2024-09-10 RX ADMIN — GABAPENTIN 600 MG: 300 CAPSULE ORAL at 08:09

## 2024-09-10 RX ADMIN — INSULIN ASPART 14 UNITS: 100 INJECTION, SOLUTION INTRAVENOUS; SUBCUTANEOUS at 12:09

## 2024-09-10 RX ADMIN — INSULIN ASPART 9 UNITS: 100 INJECTION, SOLUTION INTRAVENOUS; SUBCUTANEOUS at 12:09

## 2024-09-10 RX ADMIN — INSULIN ASPART 14 UNITS: 100 INJECTION, SOLUTION INTRAVENOUS; SUBCUTANEOUS at 08:09

## 2024-09-10 RX ADMIN — ATORVASTATIN CALCIUM 40 MG: 40 TABLET, FILM COATED ORAL at 08:09

## 2024-09-10 RX ADMIN — NORTRIPTYLINE HYDROCHLORIDE 50 MG: 25 CAPSULE ORAL at 08:09

## 2024-09-10 RX ADMIN — EMTRICITABINE AND TENOFOVIR DISOPROXIL FUMARATE 1 TABLET: 200; 300 TABLET, FILM COATED ORAL at 08:09

## 2024-09-10 RX ADMIN — ENOXAPARIN SODIUM 40 MG: 40 INJECTION SUBCUTANEOUS at 04:09

## 2024-09-10 RX ADMIN — LEVOFLOXACIN 750 MG: 750 INJECTION, SOLUTION INTRAVENOUS at 02:09

## 2024-09-10 RX ADMIN — OXACILLIN 12 G: 2 INJECTION, POWDER, FOR SOLUTION INTRAMUSCULAR; INTRAVENOUS at 10:09

## 2024-09-10 RX ADMIN — INSULIN GLARGINE 45 UNITS: 100 INJECTION, SOLUTION SUBCUTANEOUS at 08:09

## 2024-09-10 RX ADMIN — IOHEXOL 75 ML: 350 INJECTION, SOLUTION INTRAVENOUS at 04:09

## 2024-09-10 RX ADMIN — INSULIN ASPART 6 UNITS: 100 INJECTION, SOLUTION INTRAVENOUS; SUBCUTANEOUS at 08:09

## 2024-09-10 RX ADMIN — SODIUM CHLORIDE SOLN NEBU 3% 4 ML: 3 NEBU SOLN at 08:09

## 2024-09-10 RX ADMIN — INSULIN ASPART 3 UNITS: 100 INJECTION, SOLUTION INTRAVENOUS; SUBCUTANEOUS at 04:09

## 2024-09-10 RX ADMIN — POLYETHYLENE GLYCOL 3350 17 G: 17 POWDER, FOR SOLUTION ORAL at 12:09

## 2024-09-10 NOTE — SUBJECTIVE & OBJECTIVE
Interval History:  Repeat washout yesterday   Repeat blood cultures NGTD  2D negative  Switched to IV oxacillin yesterday   Afebrile X 24 hours  WBC starting to trend down   Patient feels better today though some nausea and persistent cough (but non-productive)    Review of Systems   Gastrointestinal:  Positive for nausea.   Musculoskeletal:  Positive for arthralgias and myalgias.   Skin:  Positive for color change and wound.   All other systems reviewed and are negative.    Objective:     Vital Signs (Most Recent):  Temp: 97.5 °F (36.4 °C) (09/10/24 1104)  Pulse: 87 (09/10/24 1104)  Resp: 19 (09/10/24 1104)  BP: (!) 123/59 (09/10/24 1104)  SpO2: 100 % (09/10/24 1104) Vital Signs (24h Range):  Temp:  [97.3 °F (36.3 °C)-99 °F (37.2 °C)] 97.5 °F (36.4 °C)  Pulse:  [] 87  Resp:  [18-26] 19  SpO2:  [95 %-100 %] 100 %  BP: (106-160)/(55-76) 123/59     Weight: 93 kg (205 lb 0.4 oz)  Body mass index is 34.12 kg/m².    Estimated Creatinine Clearance: 49.5 mL/min (A) (based on SCr of 1.6 mg/dL (H)).     Physical Exam  Vitals and nursing note reviewed.   Constitutional:       General: He is not in acute distress.     Appearance: He is not toxic-appearing or diaphoretic.   HENT:      Nose: No congestion.      Mouth/Throat:      Pharynx: No oropharyngeal exudate.   Eyes:      General: No scleral icterus.     Conjunctiva/sclera: Conjunctivae normal.   Cardiovascular:      Rate and Rhythm: Normal rate and regular rhythm.      Heart sounds: No murmur heard.  Pulmonary:      Effort: Pulmonary effort is normal. No respiratory distress.      Breath sounds: No wheezing.      Comments: Bibasilar crackles    Abdominal:      General: There is no distension.      Palpations: Abdomen is soft.      Tenderness: There is no abdominal tenderness.   Musculoskeletal:      Cervical back: Neck supple. No tenderness.      Comments: Right ankle with surgical dressings in place + wound vac   Skin:     Findings: Erythema present.   Neurological:       Mental Status: He is oriented to person, place, and time.   Psychiatric:         Mood and Affect: Mood normal.         Behavior: Behavior normal.        Significant Labs: Blood Culture:   Recent Labs   Lab 09/06/24  0235 09/07/24  0910 09/08/24  1641   LABBLOO Gram stain aer bottle: Gram positive cocci in clusters resembling Staph  Gram stain alicia bottle: Gram positive cocci in clusters resembling Staph  Results called to and read back by:Luciana Altamirano RN 09/06/2024  20:55  STAPHYLOCOCCUS AUREUS  For susceptibility see order #[id number]  *  Gram stain aer bottle: Gram positive cocci in clusters resembling Staph  Gram stain alicia bottle: Gram positive cocci in clusters resembling Staph  Results called to and read back by:Luciana Altamirano RN 09/06/2024  20:52  STAPHYLOCOCCUS AUREUS* Gram stain aer bottle: Gram positive cocci in clusters resembling Staph  Results called to and read back by: Roseanne Guevara RN 09/08/2024  14:07  Gram stain alicia bottle: Gram positive cocci in clusters resembling Staph  Positive results previously called 09/08/2024  STAPHYLOCOCCUS AUREUS  ID consult required at [facility].  For susceptibility see order #[id number]  *  Gram stain aer bottle: Gram positive cocci in clusters resembling Staph  Gram stain alicia bottle: Gram positive cocci in clusters resembling Staph  Results called to and read back by:Luciana Altamirano LPN 09/08/2024  03:59  STAPHYLOCOCCUS AUREUS  ID consult required at [facility].  For susceptibility see order #[id number]  * No Growth to date  No Growth to date  No Growth to date  No Growth to date     CBC:   Recent Labs   Lab 09/09/24  0252 09/10/24  0439   WBC 21.78* 20.10*   HGB 9.9* 8.4*   HCT 31.8* 25.9*   * 443     CMP:   Recent Labs   Lab 09/09/24  0252 09/10/24  0439   * 132*   K 3.7 3.9   CL 98 102   CO2 21* 21*   * 191*   BUN 18 22   CREATININE 1.6* 1.6*    CALCIUM 8.9 8.1*   PROT 6.4 5.4*   ALBUMIN 1.7* 1.3*   BILITOT 0.5 0.4   ALKPHOS 375* 314*   * 65*   ALT 59* 15   ANIONGAP 11 9     Microbiology Results (last 7 days)       Procedure Component Value Units Date/Time    Blood culture [[id number]]     Order Status: Sent Specimen: Blood     Blood culture [[id number]]  (Abnormal) Collected: 09/07/24 0910    Order Status: Completed Specimen: Blood Updated: 09/10/24 1000     Blood Culture, Routine Gram stain aer bottle: Gram positive cocci in clusters resembling Staph      Results called to and read back by: Roseanne Guevara RN 09/08/2024  14:07      Gram stain alicia bottle: Gram positive cocci in clusters resembling Staph      Positive results previously called 09/08/2024      STAPHYLOCOCCUS AUREUS  ID consult required at [facility].  For susceptibility see order #[id number]      Blood culture [[id number]]  (Abnormal) Collected: 09/07/24 0910    Order Status: Completed Specimen: Blood Updated: 09/10/24 0959     Blood Culture, Routine Gram stain aer bottle: Gram positive cocci in clusters resembling Staph      Gram stain alicia bottle: Gram positive cocci in clusters resembling Staph      Results called to and read back by:Luciana Altamirano LPN 09/08/2024      03:59      STAPHYLOCOCCUS AUREUS  ID consult required at [facility].  For susceptibility see order #[id number]      Blood culture [[id number]] Collected: 09/08/24 1641    Order Status: Completed Specimen: Blood Updated: 09/09/24 2012     Blood Culture, Routine No Growth to date      No Growth to date    Narrative:      [id number]    Blood culture [[id number]] Collected: 09/08/24 1641    Order Status: Completed Specimen: Blood Updated: 09/09/24 2012     Blood Culture, Routine No Growth to date      No Growth to date    Narrative:      08271    AFB Culture & Smear [[id number]] Collected: 09/06/24 1446    Order Status: Completed Specimen: Incision site from  Ankle, Right Updated: 09/09/24 1545     AFB Culture & Smear Culture in progress     AFB CULTURE STAIN No acid fast bacilli seen.    Narrative:      Right ankle wound - culture    Aerobic culture [[id number]]  (Abnormal)  (Susceptibility) Collected: 09/06/24 1446    Order Status: Completed Specimen: Incision site from Ankle, Right Updated: 09/09/24 1431     Aerobic Bacterial Culture STAPHYLOCOCCUS AUREUS  Many        STREPTOCOCCUS AGALACTIAE (GROUP B)  Many  Beta-hemolytic streptococci are routinely susceptible to   penicillins,cephalosporins and carbapenems.      Narrative:      Right ankle wound - culture    Fungus culture [[id number]] Collected: 09/06/24 1446    Order Status: Completed Specimen: Incision site from Ankle, Right Updated: 09/09/24 1111     Fungus (Mycology) Culture Culture in progress    Narrative:      Right ankle wound - culture    Blood culture x two cultures. Draw prior to antibiotics. [[id number]]  (Abnormal) Collected: 09/06/24 0235    Order Status: Completed Specimen: Blood from Peripheral, Antecubital, Left Updated: 09/08/24 0713     Blood Culture, Routine Gram stain aer bottle: Gram positive cocci in clusters resembling Staph      Gram stain alicia bottle: Gram positive cocci in clusters resembling Staph      Results called to and read back by:Luciana Altamirano RN 09/06/2024      20:55      STAPHYLOCOCCUS AUREUS  For susceptibility see order #[id number]      Narrative:      Aerobic and anaerobic    Blood culture x two cultures. Draw prior to antibiotics. [[id number]]  (Abnormal)  (Susceptibility) Collected: 09/06/24 0235    Order Status: Completed Specimen: Blood from Peripheral, Antecubital, Left Updated: 09/08/24 0711     Blood Culture, Routine Gram stain aer bottle: Gram positive cocci in clusters resembling Staph      Gram stain alicia bottle: Gram positive cocci in clusters resembling Staph      Results called to and read back by:Luciana Altamirano RN 09/06/2024      20:52       STAPHYLOCOCCUS AUREUS    Narrative:      Aerobic and anaerobic    Culture, Anaerobe [[id number]] Collected: 09/06/24 1446    Order Status: Completed Specimen: Incision site from Ankle, Right Updated: 09/07/24 0731     Anaerobic Culture Culture in progress    Narrative:      Right ankle wound - culture    MRSA/SA Rapid ID by PCR from Blood culture [[id number]]  (Abnormal) Collected: 09/06/24 0235    Order Status: Completed Updated: 09/06/24 2224     Staph aureus ID by PCR Positive     Methicillin Resistant ID by PCR Negative    Narrative:      Aerobic and anaerobic    Gram stain [[id number]] Collected: 09/06/24 1446    Order Status: Completed Specimen: Incision site from Ankle, Right Updated: 09/06/24 1805     Gram Stain Result Rare WBC's      Few Gram positive cocci    Narrative:      Right ankle wound - culture          Wound Culture:   Recent Labs   Lab 09/06/24  1446   LABAERO STAPHYLOCOCCUS AUREUS  Many  *  STREPTOCOCCUS AGALACTIAE (GROUP B)  Many  Beta-hemolytic streptococci are routinely susceptible to   penicillins,cephalosporins and carbapenems.  *       Significant Imaging: I have reviewed all pertinent imaging results/findings within the past 24 hours.

## 2024-09-10 NOTE — ASSESSMENT & PLAN NOTE
Endocrinology consulted for BG management.   BG goal 140-180    At this time, recommend that the patient remain off of his pump since he cannot be controlled in the Auto mode. Patient does not interact with pump frequently and relies on the auto mode algorithm.     WBD 0.8 units/kg/day  - Lantus (Insulin Glargine) 50 units nightly  (10% increase given fasting BG above goal)   - Novolog (Insulin Aspart) 16 units TIDWM (Hold if NPO) and prn for BG excursions C SSI (150/18) (20% increase given post-prandial BG above goal)   - BG checks AC/HS/0200  - Hypoglycemia protocol in place    ** Please notify Endocrine for any change and/or advance in diet**  ** Please call Endocrine for any BG related issues **    Discharge Planning:   TBD. Please notify endocrinology prior to discharge.

## 2024-09-10 NOTE — ASSESSMENT & PLAN NOTE
Hyponatremia is likely due to Dehydration/hypovolemia. The patient's most recent sodium results are listed below.  Recent Labs     09/08/24  0536 09/09/24  0252 09/10/24  0439   * 130* 132*       Plan  - Correct the sodium by 4-6mEq in 24 hours.   - Obtain the following studies: Urine sodium, urine osmolality, serum osmolality.  - Will treat the hyponatremia with IV fluids as follows: NS  - Monitor sodium Daily.   - Patient hyponatremia is improving

## 2024-09-10 NOTE — SUBJECTIVE & OBJECTIVE
Principal Problem:Surgical wound breakdown    Principal Orthopedic Problem: s/p I&D and wound vac placement on 09/06    Interval History:   Overnight events: no issues. POD1 from repeat I&D. Lateral side remains closed. Medial wound vac replaced. Intra-op plastics eval - wound not amenable to primary closure.     Vitals: Overnight vitals improved - no tachypnea or tachycardia. On 3L O2.     PT/OT update: NWB RLE with wound vac to suction    Need to know: Plastics eval for free flap. CTA lower extremity today after 3pm.         Review of patient's allergies indicates:   Allergen Reactions    Invokana [canagliflozin] Anaphylaxis    Percocet [oxycodone-acetaminophen] Nausea Only and Hallucinations    Biaxin [clarithromycin]     Hydrocodone Other (See Comments)     Dizzy/nausea/hallucinations    Sulfa (sulfonamide antibiotics) Nausea Only and Rash       Current Facility-Administered Medications   Medication    0.9%  NaCl infusion    acetaminophen tablet 1,000 mg    albuterol-ipratropium 2.5 mg-0.5 mg/3 mL nebulizer solution 3 mL    atorvastatin tablet 40 mg    dextrose 10% bolus 125 mL 125 mL    dextrose 10% bolus 125 mL 125 mL    dextrose 10% bolus 250 mL 250 mL    dextrose 10% bolus 250 mL 250 mL    emtricitabine-tenofovir 200-300 mg per tablet 1 tablet    gabapentin capsule 600 mg    And    gabapentin capsule 1,200 mg    glucagon (human recombinant) injection 1 mg    glucose chewable tablet 16 g    glucose chewable tablet 24 g    guaiFENesin 12 hr tablet 600 mg    insulin aspart U-100 pen 0-15 Units    insulin aspart U-100 pen 14 Units    insulin glargine U-100 (Lantus) pen 45 Units    morphine injection 2 mg    morphine injection 2 mg    morphine injection 4 mg    nortriptyline capsule 50 mg    oxacillin 12 g in  mL CONTINUOUS INFUSION     Objective:     Vital Signs (Most Recent):  Temp: 98 °F (36.7 °C) (09/10/24 0445)  Pulse: 84 (09/10/24 0459)  Resp: 18 (09/10/24 0445)  BP: (!) 123/57 (09/10/24 0445)  SpO2:  "99 % (09/10/24 0445) Vital Signs (24h Range):  Temp:  [97.3 °F (36.3 °C)-103 °F (39.4 °C)] 98 °F (36.7 °C)  Pulse:  [] 84  Resp:  [18-28] 18  SpO2:  [94 %-100 %] 99 %  BP: (106-160)/(55-76) 123/57     Weight: 93 kg (205 lb 0.4 oz)  Height: 5' 5" (165.1 cm)  Body mass index is 34.12 kg/m².      Intake/Output Summary (Last 24 hours) at 9/10/2024 0600  Last data filed at 9/9/2024 2100  Gross per 24 hour   Intake 400 ml   Output 400 ml   Net 0 ml        Ortho/SPM Exam     AAOx4  NAD  Tachycardic  No increased WOB    RLE    Dressing C/D/I   Wound vac to good suction  Compartments soft/compressible  Reduced sensation to the dorsum of toe  Active toe dorsiflexion & plantarflexion  WWP. Brisk cap refill      Significant Labs:   Recent Labs   Lab 09/10/24  0439   WBC 20.10*   RBC 2.99*   HGB 8.4*   HCT 25.9*      MCV 87   MCH 28.1   MCHC 32.4       Significant Imaging: I have reviewed and interpreted all pertinent imaging results/findings.  "

## 2024-09-10 NOTE — ASSESSMENT & PLAN NOTE
Cortes Schroeder is a 64 y.o. male with PMH of DM, HTN  and right trimalleolar ankle fracture status post ex fix on 07/18 with subsequent definitive fixation on 07/26 admitted with right ankle wound dehiscence in the setting of uncontrolled diabetes.      He is s/p I&D of R ankle 09/06. Repeat I&D R medial ankle 09/09.      VS: 3L O2 overnight; PE study negative   Labs: AM labs in progress   Diet: Ok for diet; strict BG control endocrine following   Pain control: multimodal regimen  PT/OT:  NWB RLE   DVT PPx: Lovenox 40  Abx:  oxacillin continuous; ID following   Cultures:  ankle cx staph +      Dispo: Plastics eval today

## 2024-09-10 NOTE — SUBJECTIVE & OBJECTIVE
"Interval HPI:   No acute events overnight. Patient in room 542/542 A. Blood glucose stable. BG above goal on current insulin regimen (SSI, prandial, and basal insulin ). Steroid use- None.   1 Day Post-Op  Renal function-    Lab Results   Component Value Date    CREATININE 1.6 (H) 09/10/2024       Vasopressors-  None     Diet diabetic  Calorie     Eatin%  Nausea: Yes  Hypoglycemia and intervention: No  Fever: No  TPN and/or TF: No    BP (!) 123/59 (BP Location: Left arm, Patient Position: Lying)   Pulse 87   Temp 97.5 °F (36.4 °C) (Oral)   Resp 19   Ht 5' 5" (1.651 m)   Wt 93 kg (205 lb 0.4 oz)   SpO2 100%   BMI 34.12 kg/m²     Labs Reviewed and Include    Recent Labs   Lab 09/10/24  0439   *   CALCIUM 8.1*   ALBUMIN 1.3*   PROT 5.4*   *   K 3.9   CO2 21*      BUN 22   CREATININE 1.6*   ALKPHOS 314*   ALT 15   AST 65*   BILITOT 0.4     Lab Results   Component Value Date    WBC 20.10 (H) 09/10/2024    HGB 8.4 (L) 09/10/2024    HCT 25.9 (L) 09/10/2024    MCV 87 09/10/2024     09/10/2024     No results for input(s): "TSH", "FREET4" in the last 168 hours.  Lab Results   Component Value Date    HGBA1C 8.5 (H) 2024       Nutritional status:   Body mass index is 34.12 kg/m².  Lab Results   Component Value Date    ALBUMIN 1.3 (L) 09/10/2024    ALBUMIN 1.7 (L) 2024    ALBUMIN 1.7 (L) 2024     Lab Results   Component Value Date    PREALBUMIN 3 (L) 2024    PREALBUMIN 13 (L) 2024       Estimated Creatinine Clearance: 49.5 mL/min (A) (based on SCr of 1.6 mg/dL (H)).    Accu-Checks  Recent Labs     24  1137 24  1538 24  2128 24  0730 24  1224 24  1739 24  1854 24  2053 09/10/24  0726 09/10/24  1127   POCTGLUCOSE 90 159* 204* 235* 203* 164* 171* 182* 212* 254*       Current Medications and/or Treatments Impacting Glycemic Control  Immunotherapy:    Immunosuppressants       None          Steroids:   Hormones " (From admission, onward)      None          Pressors:    Autonomic Drugs (From admission, onward)      None          Hyperglycemia/Diabetes Medications:   Antihyperglycemics (From admission, onward)      Start     Stop Route Frequency Ordered    09/11/24 0900  insulin glargine U-100 (Lantus) pen 50 Units         -- SubQ Daily 09/10/24 0927    09/10/24 1645  insulin aspart U-100 pen 16 Units         -- SubQ 3 times daily with meals 09/10/24 1236    09/06/24 1957  insulin aspart U-100 pen 0-15 Units         -- SubQ Before meals, nightly and at 0200 PRN 09/06/24 3393

## 2024-09-10 NOTE — ASSESSMENT & PLAN NOTE
- 2/2 blood cx with Staph aureus. 2/2 repeats positive. Repeats pending but NGTD  - Follow repeat blood cx  - ID following: start continuous oxacillin   - echo without concern for vegetations

## 2024-09-10 NOTE — ASSESSMENT & PLAN NOTE
This patient does have evidence of infective focus  My overall impression is sepsis.  Source: Skin and Soft Tissue (location ankle)  Antibiotics given-   Antibiotics (72h ago, onward)    Start     Stop Route Frequency Ordered    09/09/24 1730  oxacillin 12 g in  mL CONTINUOUS INFUSION         -- IV Every 24 hours (non-standard times) 09/09/24 1630        Latest lactate reviewed-  Recent Labs   Lab 09/08/24  1641   LACTATE 1.7       Organ dysfunction indicated by Acute kidney injury and Encephalopathy    Fluid challenge Actual Body weight- Patient will receive 30ml/kg actual body weight to calculate fluid bolus for treatment of septic shock.     Post- resuscitation assessment No - Post resuscitation assessment not needed     Will Not start Pressors-   Source control achieved by: see above

## 2024-09-10 NOTE — ASSESSMENT & PLAN NOTE
Patient with Hypoxic Respiratory failure which is Acute.  he is not on home oxygen. Supplemental oxygen was provided and noted-      .   Signs/symptoms of respiratory failure include- tachypnea and lethargy. Contributing diagnoses includes - Obesity Hypoventilation Labs and images were reviewed. Patient Has not had a recent ABG. Will treat underlying causes and adjust management of respiratory failure as follows-     - currently on 2L NC, will wean O2 as tolerated  - repeat CXR without acute process  - CTA ordered by ortho/anesthesia prior to I&D. Negative for PE, but small ground glass area to RUL representing infectious vs. Inflammatory process.  - mucinex BID, acapella, and nebulized saline   - prn duo nebs

## 2024-09-10 NOTE — PROGRESS NOTES
[facility]  Endocrinology  Progress Note    Admit Date: 2024     Reason for Consult: Management of T2DM, Hyperglycemia      Surgical Procedure and Date: S/P irrigation debridement of RLE on 2024    Diabetes diagnosis year:      Home Diabetes Medications:    Humalog via OmniPod insulin pump  Mounjaro 10 mg weekly     Current pump settings:  Basal 12 am to 2.3 and 6 am to 1.55  ICR to 3 at 12 am, 2 at 4 pm  ISF to 1:20   AIT 3 hrs  Target 110-120        Patient had anaphylaxis reaction to SGLT2 inhibitors specifically Invokana     How often checking glucose at home? Dexcom G6   BG readings on regimen: Average 210's per Dexcom  Hypoglycemia on the regimen?  Yes  Missed doses on regimen?  No     Diabetes Complications include:     Hyperglycemia and Diabetic peripheral neuropathy         Complicating diabetes co morbidities:   HLD, HTN, Obesity         HPI: 64 y.o. male presents to the ED w/ complaint of altered mental status. Hx of R ankle fracture with surgery over a month ago. Patient now presents with sepsis 2/2 R ankle infection s/p ORIF on . Started on IV vanc and cefepime. Given IVFs. Blood cultures pending. Brought to OR with ortho on  for irrigation debridement of RLE. Endocrine following for BG and type 2 diabetes.     Lab Results   Component Value Date    LABA1C 10.8 (H) 08/15/2016    HGBA1C 8.5 (H) 2024         Interval HPI:   No acute events overnight. Patient in room 542/542 A. Blood glucose stable. BG above goal on current insulin regimen (SSI, prandial, and basal insulin ). Steroid use- None.   1 Day Post-Op  Renal function-    Lab Results   Component Value Date    CREATININE 1.6 (H) 09/10/2024       Vasopressors-  None     Diet diabetic  Calorie     Eatin%  Nausea: Yes  Hypoglycemia and intervention: No  Fever: No  TPN and/or TF: No    BP (!) 123/59 (BP Location: Left arm, Patient Position: Lying)   Pulse 87   Temp 97.5 °F (36.4 °C) (Oral)   Resp 19    "Ht 5' 5" (1.651 m)   Wt 93 kg (205 lb 0.4 oz)   SpO2 100%   BMI 34.12 kg/m²     Labs Reviewed and Include    Recent Labs   Lab 09/10/24  0439   *   CALCIUM 8.1*   ALBUMIN 1.3*   PROT 5.4*   *   K 3.9   CO2 21*      BUN 22   CREATININE 1.6*   ALKPHOS 314*   ALT 15   AST 65*   BILITOT 0.4     Lab Results   Component Value Date    WBC 20.10 (H) 09/10/2024    HGB 8.4 (L) 09/10/2024    HCT 25.9 (L) 09/10/2024    MCV 87 09/10/2024     09/10/2024     No results for input(s): "TSH", "FREET4" in the last 168 hours.  Lab Results   Component Value Date    HGBA1C 8.5 (H) 09/06/2024       Nutritional status:   Body mass index is 34.12 kg/m².  Lab Results   Component Value Date    ALBUMIN 1.3 (L) 09/10/2024    ALBUMIN 1.7 (L) 09/09/2024    ALBUMIN 1.7 (L) 09/08/2024     Lab Results   Component Value Date    PREALBUMIN 3 (L) 09/06/2024    PREALBUMIN 13 (L) 07/18/2024       Estimated Creatinine Clearance: 49.5 mL/min (A) (based on SCr of 1.6 mg/dL (H)).    Accu-Checks  Recent Labs     09/08/24  1137 09/08/24  1538 09/08/24  2128 09/09/24  0730 09/09/24  1224 09/09/24  1739 09/09/24  1854 09/09/24  2053 09/10/24  0726 09/10/24  1127   POCTGLUCOSE 90 159* 204* 235* 203* 164* 171* 182* 212* 254*       Current Medications and/or Treatments Impacting Glycemic Control  Immunotherapy:    Immunosuppressants       None          Steroids:   Hormones (From admission, onward)      None          Pressors:    Autonomic Drugs (From admission, onward)      None          Hyperglycemia/Diabetes Medications:   Antihyperglycemics (From admission, onward)      Start     Stop Route Frequency Ordered    09/11/24 0900  insulin glargine U-100 (Lantus) pen 50 Units         -- SubQ Daily 09/10/24 0927    09/10/24 1645  insulin aspart U-100 pen 16 Units         -- SubQ 3 times daily with meals 09/10/24 1236    09/06/24 1957  insulin aspart U-100 pen 0-15 Units         -- SubQ Before meals, nightly and at 0200 PRN 09/06/24 1858 "            ASSESSMENT and PLAN    Renal/  DEEPALI (acute kidney injury)  Titrate insulin slowly to avoid hypoglycemia as the risk of hypoglycemia increases with decreased creatinine clearance.  Estimated Creatinine Clearance: 49.5 mL/min (A) (based on SCr of 1.6 mg/dL (H)).        Endocrine  Uncontrolled diabetes mellitus with hyperglycemia  Endocrinology consulted for BG management.   BG goal 140-180    At this time, recommend that the patient remain off of his pump since he cannot be controlled in the Auto mode. Patient does not interact with pump frequently and relies on the auto mode algorithm.     WBD 0.8 units/kg/day  - Lantus (Insulin Glargine) 50 units nightly  (10% increase given fasting BG above goal)   - Novolog (Insulin Aspart) 16 units TIDWM (Hold if NPO) and prn for BG excursions HDC SSI (150/18) (20% increase given post-prandial BG above goal)   - BG checks AC/HS/0200  - Hypoglycemia protocol in place    ** Please notify Endocrine for any change and/or advance in diet**  ** Please call Endocrine for any BG related issues **    Discharge Planning:   TBD. Please notify endocrinology prior to discharge.        Orthopedic  * Surgical wound breakdown  Optimize BG control to improve wound healing            Payton Vora PA-C  Endocrinology  [facility]

## 2024-09-10 NOTE — ASSESSMENT & PLAN NOTE
Patient's FSGs are not controlled on current hypoglycemics.   Last A1c reviewed-   Lab Results   Component Value Date    LABA1C 10.8 (H) 08/15/2016    HGBA1C 8.5 (H) 09/06/2024     Most recent fingerstick glucose reviewed-   Recent Labs   Lab 09/09/24  1854 09/09/24  2053 09/10/24  0726 09/10/24  1127   POCTGLUCOSE 171* 182* 212* 254*       Current correctional scale  Low  Maintain anti-hyperglycemic dose as follows-   Antihyperglycemics (From admission, onward)    Start     Stop Route Frequency Ordered    09/11/24 0900  insulin glargine U-100 (Lantus) pen 50 Units         -- SubQ Daily 09/10/24 0927    09/10/24 1645  insulin aspart U-100 pen 16 Units         -- SubQ 3 times daily with meals 09/10/24 1236    09/06/24 1957  insulin aspart U-100 pen 0-15 Units         -- SubQ Before meals, nightly and at 0200 PRN 09/06/24 1858         - Endocrine consulted:  - At this time, recommend that the patient remain off of his pump since he cannot be controlled in the Auto mode. Patient does not interact with pump frequently and relies on the auto mode algorithm.   - Lantus (Insulin Glargine) 40 units nightly   - Novolog (Insulin Aspart) 14 units TIDWM and prn for BG excursions MDC SSI (150/25)  - hold oral antihyperglycemics during hospitalization   - needs better BG control for optimal wound healing

## 2024-09-10 NOTE — ASSESSMENT & PLAN NOTE
64 year old with poorly controlled DM (A1C 8.5), recent fall resulting in left wrist fx and right ankle fracture 07/18/24 s/p ex fix Rt ankle and ORIF left wrist 07/19, and later subsequent ORIF right ankle on 7/26/24 with Dr. Liz -- now c/b deep surgical site infection of right ankle involving underlying hardware with associated bacteremia.   No SOI in left wrist.      S/p I&D on 09/06 and 9/9.  Op report noting purulence overlying hardware.  No plans for hardware removal.  Pending CTA to evaluate vasculature and Plastics eval for possible closure (unable to achieve primary closure).       Surgical cxs +GBS, MSSA.  Blood cultures 9/6 and 9/7 + for MSSA.  Repeat blood cultures 9/8 NGTD.  2D echo negative.   Cefazolin switched to oxacillin yesterday in setting of persistent fevers/leukocytosis    Recommendations / Plan:  Continue IV oxacillin 12 g q 24 hours continuous infusion   Repeat blood cultures X1  Anticipate 6 weeks of IV antibiotics from date of most recent washout.    Once bld cxs remain cleared for 72hrs, will consider adding rifampin for improved penetration of hardware biofilm, considering retention of involved underling hardware.  Will check drug interactions  Anticipate long term antibiotic suppression following IV abx   Will follow.      ,Data reviewed and plan discussed with ID staff  Secure chat/Discussed above plan with Primary Team

## 2024-09-10 NOTE — PROGRESS NOTES
[facility]  Orthopedics  Progress Note    Attg Note:  I agree with the resident's assessment and plan.    Moe Liz MD      Patient Name: Cortes Schroeder  MRN: [id number]  Admission Date: 9/6/2024  Hospital Length of Stay: 4 days  Attending Provider: Filemon Miner MD  Primary Care Provider: Andrew Sinclair Jr., MD  Follow-up For: Procedure(s) (LRB):  IRRIGATION AND DEBRIDEMENT, LOWER EXTREMITY - WITH WOUND VAC CHANGE, RIGHT ANKLE (Right)  ARTHROTOMY, ANKLE (Right)    Post-Operative Day: 1 Day Post-Op  Subjective:     Principal Problem:Surgical wound breakdown    Principal Orthopedic Problem: s/p I&D and wound vac placement on 09/06    Interval History:   Overnight events: no issues. POD1 from repeat I&D. Lateral side remains closed. Medial wound vac replaced. Intra-op plastics eval - wound not amenable to primary closure.     Vitals: Overnight vitals improved - no tachypnea or tachycardia. On 3L O2.     PT/OT update: NWB RLE with wound vac to suction    Need to know: Plastics eval for free flap. CTA lower extremity today after 3pm.         Review of patient's allergies indicates:   Allergen Reactions    Invokana [canagliflozin] Anaphylaxis    Percocet [oxycodone-acetaminophen] Nausea Only and Hallucinations    Biaxin [clarithromycin]     Hydrocodone Other (See Comments)     Dizzy/nausea/hallucinations    Sulfa (sulfonamide antibiotics) Nausea Only and Rash       Current Facility-Administered Medications   Medication    0.9%  NaCl infusion    acetaminophen tablet 1,000 mg    albuterol-ipratropium 2.5 mg-0.5 mg/3 mL nebulizer solution 3 mL    atorvastatin tablet 40 mg    dextrose 10% bolus 125 mL 125 mL    dextrose 10% bolus 125 mL 125 mL    dextrose 10% bolus 250 mL 250 mL    dextrose 10% bolus 250 mL 250 mL    emtricitabine-tenofovir 200-300 mg per tablet 1 tablet    gabapentin capsule 600 mg    And    gabapentin capsule 1,200 mg    glucagon (human recombinant) injection 1 mg    glucose chewable  "tablet 16 g    glucose chewable tablet 24 g    guaiFENesin 12 hr tablet 600 mg    insulin aspart U-100 pen 0-15 Units    insulin aspart U-100 pen 14 Units    insulin glargine U-100 (Lantus) pen 45 Units    morphine injection 2 mg    morphine injection 2 mg    morphine injection 4 mg    nortriptyline capsule 50 mg    oxacillin 12 g in  mL CONTINUOUS INFUSION     Objective:     Vital Signs (Most Recent):  Temp: 98 °F (36.7 °C) (09/10/24 0445)  Pulse: 84 (09/10/24 0459)  Resp: 18 (09/10/24 0445)  BP: (!) 123/57 (09/10/24 0445)  SpO2: 99 % (09/10/24 0445) Vital Signs (24h Range):  Temp:  [97.3 °F (36.3 °C)-103 °F (39.4 °C)] 98 °F (36.7 °C)  Pulse:  [] 84  Resp:  [18-28] 18  SpO2:  [94 %-100 %] 99 %  BP: (106-160)/(55-76) 123/57     Weight: 93 kg (205 lb 0.4 oz)  Height: 5' 5" (165.1 cm)  Body mass index is 34.12 kg/m².      Intake/Output Summary (Last 24 hours) at 9/10/2024 0600  Last data filed at 9/9/2024 2100  Gross per 24 hour   Intake 400 ml   Output 400 ml   Net 0 ml        Ortho/SPM Exam     AAOx4  NAD  Tachycardic  No increased WOB    RLE    Dressing C/D/I   Wound vac to good suction  Compartments soft/compressible  Reduced sensation to the dorsum of toe  Active toe dorsiflexion & plantarflexion  WWP. Brisk cap refill      Significant Labs:   Recent Labs   Lab 09/10/24  0439   WBC 20.10*   RBC 2.99*   HGB 8.4*   HCT 25.9*      MCV 87   MCH 28.1   MCHC 32.4       Significant Imaging: I have reviewed and interpreted all pertinent imaging results/findings.  Assessment/Plan:     * Surgical wound breakdown  Cortes Schroeder is a 64 y.o. male with PMH of DM, HTN  and right trimalleolar ankle fracture status post ex fix on 07/18 with subsequent definitive fixation on 07/26 admitted with right ankle wound dehiscence in the setting of uncontrolled diabetes.      He is s/p I&D of R ankle 09/06. Repeat I&D R medial ankle 09/09.      VS: 3L O2 overnight; PE study negative   Labs: AM labs in progress "   Diet: Ok for diet; strict BG control endocrine following   Pain control: multimodal regimen  PT/OT:  NWB RLE   DVT PPx: Lovenox 40  Abx:  oxacillin continuous; ID following   Cultures:  ankle cx staph +      Dispo: Plastics eval today                 Artur Galvan MD  Orthopedics  [facility] - Surgery

## 2024-09-10 NOTE — SUBJECTIVE & OBJECTIVE
Interval History: NAEON. VSSAF. Satting well on 3L NC. Cr still 1.6 today. Patient seen this morning. Feeling better this morning and overnight. States his appetite was coming back and he ate all of his breakfast this morning. Endorses cough, but difficulty coughing anything up.     Review of Systems   Constitutional:  Positive for activity change and fatigue. Negative for chills and fever.   Respiratory:  Positive for cough. Negative for chest tightness and shortness of breath.    Cardiovascular:  Negative for chest pain and leg swelling.   Gastrointestinal:  Negative for abdominal pain and nausea.   Musculoskeletal:  Positive for arthralgias.   Skin:  Positive for color change and wound.   Neurological:  Negative for dizziness and weakness.     Objective:     Vital Signs (Most Recent):  Temp: 97.5 °F (36.4 °C) (09/10/24 1104)  Pulse: 87 (09/10/24 1104)  Resp: 19 (09/10/24 1104)  BP: (!) 123/59 (09/10/24 1104)  SpO2: 100 % (09/10/24 1104) Vital Signs (24h Range):  Temp:  [97.3 °F (36.3 °C)-103 °F (39.4 °C)] 97.5 °F (36.4 °C)  Pulse:  [] 87  Resp:  [18-26] 19  SpO2:  [94 %-100 %] 100 %  BP: (106-160)/(55-76) 123/59     Weight: 93 kg (205 lb 0.4 oz)  Body mass index is 34.12 kg/m².    Intake/Output Summary (Last 24 hours) at 9/10/2024 1236  Last data filed at 9/9/2024 2100  Gross per 24 hour   Intake 400 ml   Output 400 ml   Net 0 ml         Physical Exam  Vitals and nursing note reviewed.   Constitutional:       Appearance: He is well-developed. He is obese. He is ill-appearing.   Eyes:      Pupils: Pupils are equal, round, and reactive to light.   Cardiovascular:      Rate and Rhythm: Normal rate and regular rhythm.   Pulmonary:      Effort: Pulmonary effort is normal.      Breath sounds: Rales (mild, L base) present.      Comments: On 2L NC. Cough with deep inspiration  Abdominal:      Palpations: Abdomen is soft.      Tenderness: There is no abdominal tenderness.   Musculoskeletal:         General: No  tenderness.   Skin:     General: Skin is warm and dry.      Comments: C/d/I dressing to R ankle with wound vac. Above dressing is mildly warmer than LLE, but no erythema    Neurological:      Mental Status: He is alert and oriented to person, place, and time.   Psychiatric:         Behavior: Behavior normal.             Significant Labs: All pertinent labs within the past 24 hours have been reviewed.  CBC:   Recent Labs   Lab 09/09/24 0252 09/10/24  0439   WBC 21.78* 20.10*   HGB 9.9* 8.4*   HCT 31.8* 25.9*   * 443     CMP:   Recent Labs   Lab 09/09/24 0252 09/10/24  0439   * 132*   K 3.7 3.9   CL 98 102   CO2 21* 21*   * 191*   BUN 18 22   CREATININE 1.6* 1.6*   CALCIUM 8.9 8.1*   PROT 6.4 5.4*   ALBUMIN 1.7* 1.3*   BILITOT 0.5 0.4   ALKPHOS 375* 314*   * 65*   ALT 59* 15   ANIONGAP 11 9       Significant Imaging: I have reviewed all pertinent imaging results/findings within the past 24 hours.

## 2024-09-10 NOTE — PLAN OF CARE
Problem: Adult Inpatient Plan of Care  Goal: Plan of Care Review  Outcome: Progressing  Goal: Patient-Specific Goal (Individualized)  Outcome: Progressing  Goal: Absence of Hospital-Acquired Illness or Injury  Outcome: Progressing  Goal: Optimal Comfort and Wellbeing  Outcome: Progressing  Goal: Readiness for Transition of Care  Outcome: Progressing     Problem: Fall Injury Risk  Goal: Absence of Fall and Fall-Related Injury  Outcome: Progressing     Pt is progressing towards plan of care goals. Pain management effective.  Explained plan of care, pt verbalized understanding. No injury during shift, Side rails up x 3, call light by bedside.

## 2024-09-10 NOTE — ASSESSMENT & PLAN NOTE
Downtrending   - liver enzymes uptrending likely 2/2 severe infection  - will hold truvada for now  - monitor with daily CMP

## 2024-09-10 NOTE — ASSESSMENT & PLAN NOTE
Closed trimalleolar fracture of right ankle s/p ORIF in July  65 yo M presenting with AMS and worsening redness, swelling and pain to R ankle.     - continue IV vanc and cefepime  - Ortho consulted:   - taken to OR 09/06 for debridement of surgical wound with wound vac placed   - Repeat I&D of R medial ankle on 09/09  - follow wound cultures -- Staph aureus and GBS  - follow blood cultures -- Staph aureus.   - ID following:  - Discontinue cefazolin and start IV oxacillin 12 g q 24 hours as a continuous infusion  - Trend liver enzymes   - Monitor renal function   - Follow repeat blood cultures    - Will follow up tomorrow  - Plastic consulted; appreciate recs

## 2024-09-10 NOTE — CONSULTS
RD consulted for malnutrition d/t albumin. Wt stable the past years.     Albumin and Prealbumin are negative acute-phase reactants that should not be used as nutrition indictors. Both can be affected by inflammation and fluid status. If CRP is elevated, this can cause Albumin to be falsely low.    RD to follow up,    Thanks.    Pamella Castrejon RD, LDN

## 2024-09-10 NOTE — PROGRESS NOTES
[facility] - Surgery  Infectious Disease  Progress Note    Patient Name: Cortes Schroeder  MRN: [id number]  Admission Date: 9/6/2024  Length of Stay: 4 days  Attending Physician: Filemon Miner MD  Primary Care Provider: Andrew Sinclair Jr., MD    Isolation Status: No active isolations  Assessment/Plan:      ID  Surgical site infection    64 year old with poorly controlled DM (A1C 8.5), recent fall resulting in left wrist fx and right ankle fracture 07/18/24 s/p ex fix Rt ankle and ORIF left wrist 07/19, and later subsequent ORIF right ankle on 7/26/24 with Dr. Liz -- now c/b deep surgical site infection of right ankle involving underlying hardware with associated bacteremia.   No SOI in left wrist.      S/p I&D on 09/06 and 9/9.  Op report noting purulence overlying hardware.  No plans for hardware removal.  Pending CTA to evaluate vasculature and Plastics eval for possible closure (unable to achieve primary closure).       Surgical cxs +GBS, MSSA.  Blood cultures 9/6 and 9/7 + for MSSA.  Repeat blood cultures 9/8 NGTD.  2D echo negative.   Cefazolin switched to oxacillin yesterday in setting of persistent fevers/leukocytosis    Recommendations / Plan:  Continue IV oxacillin 12 g q 24 hours continuous infusion   Repeat blood cultures X1  Anticipate 6 weeks of IV antibiotics from date of most recent washout.    Once bld cxs remain cleared for 72hrs, will consider adding rifampin for improved penetration of hardware biofilm, considering retention of involved underling hardware.  Will check drug interactions  Anticipate long term antibiotic suppression following IV abx   Will follow.      ,Data reviewed and plan discussed with ID staff  Secure chat/Discussed above plan with Primary Team      Thank you.   Please Secure Chat for any questions or concerns.  ISELA Alexis, ANP-C  Infectious Disease     I spent a total of 60 minutes on the day of the visit.This includes face to face time and non-face to face  time preparing to see the patient (eg, review of tests), obtaining and/or reviewing separately obtained history, documenting clinical information in the electronic or other health record, independently interpreting results and communicating results to the patient/family/caregiver, or care coordinator.   Subjective:     Principal Problem:Surgical wound breakdown    HPI: Cortes Schroeder is a 64 year old with HTN, poorly controlled DM (A1C 8.5), recent history of syncopal episodes, and  right ankle fracture on 7/18/24 s/p ex fix with subsequent ORIF on 7/26/24 with Dr. Liz.  At outpatient Orthopedic follow up surgical wound was healing and sutures removed.  Since then patient  reportedly doing well until about 1 week ago when he noticed some drainage from his surgical incisions.  Over the past week he developed fevers, chills, and night sweats.  Yesterday he became confused/altered and was brought to ED.  In ED was hypotensive, tachycardic, WBC 18, .  Sepsis protocol initiated and started on clindamycin has been doing well postoperatively until around 1 week ago when he started noticing some drainage from his surgical incisions. He started to develop fevers, chills and night sweats over the past week. Yesterday afternoon, patient became altered and was brought to the ED by his roommate. Patient currently lives in [place]. Upon presentation to the ED, patient was tachycardic, hypotensive and afebrile. WBC of 18.68, .3. Sepsis protocol initiated. Patient was started on clindamycin and vancomycin.  Now on vancomycin and cefepime.       Orthopedics consulted.  Noted draining wound over the medial side of the right ankle as well as eschar formation over the lateral side of the ankle.  Right ankle and foot noted to be erythematous and edematous.  Pending surgical I&D today.     I have reviewed hospital notes from  HM service and other specialty providers. I have also reviewed CBC, CMP/BMP,  cultures and  imaging with my interpretation as documented.      64M with poorly controlled DM (A1C 8.5), recent right ankle fracture 07/18/24 s/p ex fix with subsequent ORIF on 7/26/24 with Dr. Liz -- now c/b deep surgical site infection involving underlying hardware. Pt arrived S/p I&D 09/06. Op report noting purulence overlying hardware, cxs sent, gram stain +GPCs. Index bld cxs 09/06 +Staph aureus (BCID MRSA neg). Repeat bld cxs 09/07 NGTD. Post-op day 1 developed fever, with tachycardia 120, and ongoing AMS. Pt continuing on Iv-vanc and escalated cefepime to zosyn.    Ortho plans to return to OR Monday 09/09 for repeat I&D with Plastic surgery assistance for closure.    Recommendations / Plan:  Continue Iv-vanc and zosyn.   Will f/u repeat bld cxs 09/07 to ensure cleared  Will f/u surgical cxs and adjust abx accordingly  Recommend TTE  Anticipate abx duration of 6wks s/p 09/09, DON 10/21.      -- Discussed with ID staff and primary team   -- ID will continue to follow w/ further recs.          Interval History:  Repeat washout yesterday   Repeat blood cultures NGTD  2D negative  Switched to IV oxacillin yesterday   Afebrile X 24 hours  WBC starting to trend down   Patient feels better today though some nausea and persistent cough (but non-productive)    Review of Systems   Gastrointestinal:  Positive for nausea.   Musculoskeletal:  Positive for arthralgias and myalgias.   Skin:  Positive for color change and wound.   All other systems reviewed and are negative.    Objective:     Vital Signs (Most Recent):  Temp: 97.5 °F (36.4 °C) (09/10/24 1104)  Pulse: 87 (09/10/24 1104)  Resp: 19 (09/10/24 1104)  BP: (!) 123/59 (09/10/24 1104)  SpO2: 100 % (09/10/24 1104) Vital Signs (24h Range):  Temp:  [97.3 °F (36.3 °C)-99 °F (37.2 °C)] 97.5 °F (36.4 °C)  Pulse:  [] 87  Resp:  [18-26] 19  SpO2:  [95 %-100 %] 100 %  BP: (106-160)/(55-76) 123/59     Weight: 93 kg (205 lb 0.4 oz)  Body mass index is 34.12 kg/m².    Estimated  Creatinine Clearance: 49.5 mL/min (A) (based on SCr of 1.6 mg/dL (H)).     Physical Exam  Vitals and nursing note reviewed.   Constitutional:       General: He is not in acute distress.     Appearance: He is not toxic-appearing or diaphoretic.   HENT:      Nose: No congestion.      Mouth/Throat:      Pharynx: No oropharyngeal exudate.   Eyes:      General: No scleral icterus.     Conjunctiva/sclera: Conjunctivae normal.   Cardiovascular:      Rate and Rhythm: Normal rate and regular rhythm.      Heart sounds: No murmur heard.  Pulmonary:      Effort: Pulmonary effort is normal. No respiratory distress.      Breath sounds: No wheezing.      Comments: Bibasilar crackles    Abdominal:      General: There is no distension.      Palpations: Abdomen is soft.      Tenderness: There is no abdominal tenderness.   Musculoskeletal:      Cervical back: Neck supple. No tenderness.      Comments: Right ankle with surgical dressings in place + wound vac   Skin:     Findings: Erythema present.   Neurological:      Mental Status: He is oriented to person, place, and time.   Psychiatric:         Mood and Affect: Mood normal.         Behavior: Behavior normal.        Significant Labs: Blood Culture:   Recent Labs   Lab 09/06/24  0235 09/07/24  0910 09/08/24  1641   LABBLOO Gram stain aer bottle: Gram positive cocci in clusters resembling Staph  Gram stain alicia bottle: Gram positive cocci in clusters resembling Staph  Results called to and read back by:Luciana Altamirano RN 09/06/2024  20:55  STAPHYLOCOCCUS AUREUS  For susceptibility see order #[id number]  *  Gram stain aer bottle: Gram positive cocci in clusters resembling Staph  Gram stain alicia bottle: Gram positive cocci in clusters resembling Staph  Results called to and read back by:Luciana Altamirano RN 09/06/2024  20:52  STAPHYLOCOCCUS AUREUS* Gram stain aer bottle: Gram positive cocci in clusters resembling Staph  Results called to and read back by: Roseanne Guevara RN  09/08/2024  14:07  Gram stain alicia bottle: Gram positive cocci in clusters resembling Staph  Positive results previously called 09/08/2024  STAPHYLOCOCCUS AUREUS  ID consult required at [facility].  For susceptibility see order #[id number]  *  Gram stain aer bottle: Gram positive cocci in clusters resembling Staph  Gram stain alicia bottle: Gram positive cocci in clusters resembling Staph  Results called to and read back by:Luciana Altamirano LPN 09/08/2024  03:59  STAPHYLOCOCCUS AUREUS  ID consult required at [facility].  For susceptibility see order #[id number]  * No Growth to date  No Growth to date  No Growth to date  No Growth to date     CBC:   Recent Labs   Lab 09/09/24  0252 09/10/24  0439   WBC 21.78* 20.10*   HGB 9.9* 8.4*   HCT 31.8* 25.9*   * 443     CMP:   Recent Labs   Lab 09/09/24  0252 09/10/24  0439   * 132*   K 3.7 3.9   CL 98 102   CO2 21* 21*   * 191*   BUN 18 22   CREATININE 1.6* 1.6*   CALCIUM 8.9 8.1*   PROT 6.4 5.4*   ALBUMIN 1.7* 1.3*   BILITOT 0.5 0.4   ALKPHOS 375* 314*   * 65*   ALT 59* 15   ANIONGAP 11 9     Microbiology Results (last 7 days)       Procedure Component Value Units Date/Time    Blood culture [[id number]]     Order Status: Sent Specimen: Blood     Blood culture [[id number]]  (Abnormal) Collected: 09/07/24 0910    Order Status: Completed Specimen: Blood Updated: 09/10/24 1000     Blood Culture, Routine Gram stain aer bottle: Gram positive cocci in clusters resembling Staph      Results called to and read back by: Roseanne Guevara RN 09/08/2024  14:07      Gram stain alicia bottle: Gram positive cocci in clusters resembling Staph      Positive results previously called 09/08/2024      STAPHYLOCOCCUS AUREUS  ID consult required at [facility].  For susceptibility see order #[id number]      Blood culture [[id number]]  (Abnormal) Collected: 09/07/24 0910     Order Status: Completed Specimen: Blood Updated: 09/10/24 0959     Blood Culture, Routine Gram stain aer bottle: Gram positive cocci in clusters resembling Staph      Gram stain alicia bottle: Gram positive cocci in clusters resembling Staph      Results called to and read back by:Luciana Altamirano LPN 09/08/2024      03:59      STAPHYLOCOCCUS AUREUS  ID consult required at [facility], [facility] and [facility] locations.  For susceptibility see order #[id number]      Blood culture [[id number]] Collected: 09/08/24 1641    Order Status: Completed Specimen: Blood Updated: 09/09/24 2012     Blood Culture, Routine No Growth to date      No Growth to date    Narrative:      [id number]    Blood culture [[id number]] Collected: 09/08/24 1641    Order Status: Completed Specimen: Blood Updated: 09/09/24 2012     Blood Culture, Routine No Growth to date      No Growth to date    Narrative:      08271    AFB Culture & Smear [[id number]] Collected: 09/06/24 1446    Order Status: Completed Specimen: Incision site from Ankle, Right Updated: 09/09/24 1545     AFB Culture & Smear Culture in progress     AFB CULTURE STAIN No acid fast bacilli seen.    Narrative:      Right ankle wound - culture    Aerobic culture [[id number]]  (Abnormal)  (Susceptibility) Collected: 09/06/24 1446    Order Status: Completed Specimen: Incision site from Ankle, Right Updated: 09/09/24 1431     Aerobic Bacterial Culture STAPHYLOCOCCUS AUREUS  Many        STREPTOCOCCUS AGALACTIAE (GROUP B)  Many  Beta-hemolytic streptococci are routinely susceptible to   penicillins,cephalosporins and carbapenems.      Narrative:      Right ankle wound - culture    Fungus culture [[id number]] Collected: 09/06/24 1446    Order Status: Completed Specimen: Incision site from Ankle, Right Updated: 09/09/24 1111     Fungus (Mycology) Culture Culture in progress    Narrative:      Right ankle wound - culture    Blood culture x two cultures. Draw prior to antibiotics. [[id number]]   (Abnormal) Collected: 09/06/24 0235    Order Status: Completed Specimen: Blood from Peripheral, Antecubital, Left Updated: 09/08/24 0713     Blood Culture, Routine Gram stain aer bottle: Gram positive cocci in clusters resembling Staph      Gram stain alicia bottle: Gram positive cocci in clusters resembling Staph      Results called to and read back by:Luciana Altamirano RN 09/06/2024      20:55      STAPHYLOCOCCUS AUREUS  For susceptibility see order #[id number]      Narrative:      Aerobic and anaerobic    Blood culture x two cultures. Draw prior to antibiotics. [[id number]]  (Abnormal)  (Susceptibility) Collected: 09/06/24 0235    Order Status: Completed Specimen: Blood from Peripheral, Antecubital, Left Updated: 09/08/24 0711     Blood Culture, Routine Gram stain aer bottle: Gram positive cocci in clusters resembling Staph      Gram stain alicia bottle: Gram positive cocci in clusters resembling Staph      Results called to and read back by:Luciana Altamirano RN 09/06/2024      20:52      STAPHYLOCOCCUS AUREUS    Narrative:      Aerobic and anaerobic    Culture, Anaerobe [[id number]] Collected: 09/06/24 1446    Order Status: Completed Specimen: Incision site from Ankle, Right Updated: 09/07/24 0731     Anaerobic Culture Culture in progress    Narrative:      Right ankle wound - culture    MRSA/SA Rapid ID by PCR from Blood culture [[id number]]  (Abnormal) Collected: 09/06/24 0235    Order Status: Completed Updated: 09/06/24 2224     Staph aureus ID by PCR Positive     Methicillin Resistant ID by PCR Negative    Narrative:      Aerobic and anaerobic    Gram stain [[id number]] Collected: 09/06/24 1446    Order Status: Completed Specimen: Incision site from Ankle, Right Updated: 09/06/24 1805     Gram Stain Result Rare WBC's      Few Gram positive cocci    Narrative:      Right ankle wound - culture          Wound Culture:   Recent Labs   Lab 09/06/24  1446   LABAERO STAPHYLOCOCCUS AUREUS  Many  *  STREPTOCOCCUS  AGALACTIAE (GROUP B)  Many  Beta-hemolytic streptococci are routinely susceptible to   penicillins,cephalosporins and carbapenems.  *       Significant Imaging: I have reviewed all pertinent imaging results/findings within the past 24 hours.

## 2024-09-10 NOTE — NURSING
Nurses Note -- 4 Eyes      9/9/2024  7:30 PM      Skin assessed during: Q Shift Change      [x] No Altered Skin Integrity Present    []Prevention Measures Documented      [] Yes- Altered Skin Integrity Present or Discovered   [] LDA Added if Not in Epic (Describe Wound)   [] New Altered Skin Integrity was Present on Admit and Documented in LDA   [] Wound Image Taken    Wound Care Consulted? No    Attending Nurse: Lovely WINN     Second RN/Staff Member:  Shabnam PRITCHARD

## 2024-09-10 NOTE — ASSESSMENT & PLAN NOTE
DEEPALI is likely due to pre-renal azotemia due to dehydration. Baseline creatinine is  1 . Most recent creatinine and eGFR are listed below.  Recent Labs     09/08/24  0536 09/09/24  0252 09/10/24  0439   CREATININE 1.2 1.6* 1.6*   EGFRNORACEVR >60.0 48.1* 48.1*        Plan  - DEEPALI is worsening. Will adjust treatment as follows: give additional fluids   - Avoid nephrotoxins and renally dose meds for GFR listed above  - Monitor urine output, serial BMP, and adjust therapy as needed

## 2024-09-11 PROBLEM — R05.9 COUGH: Status: ACTIVE | Noted: 2024-09-11

## 2024-09-11 PROBLEM — B95.61 MSSA BACTEREMIA: Status: ACTIVE | Noted: 2024-09-07

## 2024-09-11 LAB
ALBUMIN SERPL BCP-MCNC: 1.3 G/DL (ref 3.5–5.2)
ALP SERPL-CCNC: 549 U/L (ref 55–135)
ALT SERPL W/O P-5'-P-CCNC: 19 U/L (ref 10–44)
ANION GAP SERPL CALC-SCNC: 10 MMOL/L (ref 8–16)
AST SERPL-CCNC: 151 U/L (ref 10–40)
BACTERIA BLD CULT: ABNORMAL
BILIRUB SERPL-MCNC: 0.4 MG/DL (ref 0.1–1)
BUN SERPL-MCNC: 30 MG/DL (ref 8–23)
CALCIUM SERPL-MCNC: 7.7 MG/DL (ref 8.7–10.5)
CHLORIDE SERPL-SCNC: 104 MMOL/L (ref 95–110)
CO2 SERPL-SCNC: 18 MMOL/L (ref 23–29)
CREAT SERPL-MCNC: 2.1 MG/DL (ref 0.5–1.4)
ERYTHROCYTE [DISTWIDTH] IN BLOOD BY AUTOMATED COUNT: 15.5 % (ref 11.5–14.5)
EST. GFR  (NO RACE VARIABLE): 34.7 ML/MIN/1.73 M^2
GLUCOSE SERPL-MCNC: 113 MG/DL (ref 70–110)
HCT VFR BLD AUTO: 27.1 % (ref 40–54)
HGB BLD-MCNC: 9 G/DL (ref 14–18)
MCH RBC QN AUTO: 28.2 PG (ref 27–31)
MCHC RBC AUTO-ENTMCNC: 33.2 G/DL (ref 32–36)
MCV RBC AUTO: 85 FL (ref 82–98)
PLATELET # BLD AUTO: 482 K/UL (ref 150–450)
PMV BLD AUTO: 9.4 FL (ref 9.2–12.9)
POCT GLUCOSE: 107 MG/DL (ref 70–110)
POCT GLUCOSE: 139 MG/DL (ref 70–110)
POCT GLUCOSE: 68 MG/DL (ref 70–110)
POCT GLUCOSE: 89 MG/DL (ref 70–110)
POCT GLUCOSE: 90 MG/DL (ref 70–110)
POTASSIUM SERPL-SCNC: 3.4 MMOL/L (ref 3.5–5.1)
PROT SERPL-MCNC: 5.9 G/DL (ref 6–8.4)
RBC # BLD AUTO: 3.19 M/UL (ref 4.6–6.2)
SODIUM SERPL-SCNC: 132 MMOL/L (ref 136–145)
WBC # BLD AUTO: 21.86 K/UL (ref 3.9–12.7)

## 2024-09-11 PROCEDURE — 63600175 PHARM REV CODE 636 W HCPCS: Performed by: NURSE PRACTITIONER

## 2024-09-11 PROCEDURE — 80053 COMPREHEN METABOLIC PANEL: CPT

## 2024-09-11 PROCEDURE — 94664 DEMO&/EVAL PT USE INHALER: CPT

## 2024-09-11 PROCEDURE — 99900035 HC TECH TIME PER 15 MIN (STAT)

## 2024-09-11 PROCEDURE — 21400001 HC TELEMETRY ROOM

## 2024-09-11 PROCEDURE — 25000242 PHARM REV CODE 250 ALT 637 W/ HCPCS

## 2024-09-11 PROCEDURE — 63600175 PHARM REV CODE 636 W HCPCS

## 2024-09-11 PROCEDURE — 63600175 PHARM REV CODE 636 W HCPCS: Performed by: STUDENT IN AN ORGANIZED HEALTH CARE EDUCATION/TRAINING PROGRAM

## 2024-09-11 PROCEDURE — 85027 COMPLETE CBC AUTOMATED: CPT

## 2024-09-11 PROCEDURE — 25000003 PHARM REV CODE 250

## 2024-09-11 PROCEDURE — 25000003 PHARM REV CODE 250: Performed by: NURSE PRACTITIONER

## 2024-09-11 PROCEDURE — 94640 AIRWAY INHALATION TREATMENT: CPT

## 2024-09-11 PROCEDURE — 27000646 HC AEROBIKA DEVICE

## 2024-09-11 PROCEDURE — 36415 COLL VENOUS BLD VENIPUNCTURE: CPT

## 2024-09-11 PROCEDURE — 94761 N-INVAS EAR/PLS OXIMETRY MLT: CPT

## 2024-09-11 PROCEDURE — 27000221 HC OXYGEN, UP TO 24 HOURS

## 2024-09-11 PROCEDURE — 99233 SBSQ HOSP IP/OBS HIGH 50: CPT | Mod: ,,, | Performed by: NURSE PRACTITIONER

## 2024-09-11 PROCEDURE — 51798 US URINE CAPACITY MEASURE: CPT

## 2024-09-11 RX ORDER — DOXYCYCLINE HYCLATE 100 MG
100 TABLET ORAL EVERY 12 HOURS
Status: DISCONTINUED | OUTPATIENT
Start: 2024-09-11 | End: 2024-09-13

## 2024-09-11 RX ORDER — CALCIUM CARBONATE 200(500)MG
1000 TABLET,CHEWABLE ORAL 3 TIMES DAILY PRN
Status: DISCONTINUED | OUTPATIENT
Start: 2024-09-11 | End: 2024-10-04 | Stop reason: HOSPADM

## 2024-09-11 RX ORDER — POTASSIUM CHLORIDE 20 MEQ/1
40 TABLET, EXTENDED RELEASE ORAL ONCE
Status: COMPLETED | OUTPATIENT
Start: 2024-09-11 | End: 2024-09-11

## 2024-09-11 RX ORDER — INSULIN ASPART 100 [IU]/ML
9 INJECTION, SOLUTION INTRAVENOUS; SUBCUTANEOUS
Status: DISCONTINUED | OUTPATIENT
Start: 2024-09-12 | End: 2024-09-12

## 2024-09-11 RX ORDER — INSULIN ASPART 100 [IU]/ML
0-10 INJECTION, SOLUTION INTRAVENOUS; SUBCUTANEOUS
Status: DISCONTINUED | OUTPATIENT
Start: 2024-09-11 | End: 2024-09-13

## 2024-09-11 RX ORDER — SODIUM CHLORIDE, SODIUM LACTATE, POTASSIUM CHLORIDE, CALCIUM CHLORIDE 600; 310; 30; 20 MG/100ML; MG/100ML; MG/100ML; MG/100ML
INJECTION, SOLUTION INTRAVENOUS CONTINUOUS
Status: ACTIVE | OUTPATIENT
Start: 2024-09-11 | End: 2024-09-11

## 2024-09-11 RX ORDER — ONDANSETRON 8 MG/1
8 TABLET, ORALLY DISINTEGRATING ORAL EVERY 8 HOURS PRN
Status: DISCONTINUED | OUTPATIENT
Start: 2024-09-11 | End: 2024-10-04 | Stop reason: HOSPADM

## 2024-09-11 RX ORDER — INSULIN ASPART 100 [IU]/ML
12 INJECTION, SOLUTION INTRAVENOUS; SUBCUTANEOUS
Status: DISCONTINUED | OUTPATIENT
Start: 2024-09-11 | End: 2024-09-11

## 2024-09-11 RX ADMIN — EMTRICITABINE AND TENOFOVIR DISOPROXIL FUMARATE 1 TABLET: 200; 300 TABLET, FILM COATED ORAL at 08:09

## 2024-09-11 RX ADMIN — ATORVASTATIN CALCIUM 40 MG: 40 TABLET, FILM COATED ORAL at 08:09

## 2024-09-11 RX ADMIN — GABAPENTIN 1200 MG: 400 CAPSULE ORAL at 09:09

## 2024-09-11 RX ADMIN — INSULIN ASPART 12 UNITS: 100 INJECTION, SOLUTION INTRAVENOUS; SUBCUTANEOUS at 04:09

## 2024-09-11 RX ADMIN — INSULIN GLARGINE 50 UNITS: 100 INJECTION, SOLUTION SUBCUTANEOUS at 08:09

## 2024-09-11 RX ADMIN — ONDANSETRON 8 MG: 8 TABLET, ORALLY DISINTEGRATING ORAL at 11:09

## 2024-09-11 RX ADMIN — POLYETHYLENE GLYCOL 3350 17 G: 17 POWDER, FOR SOLUTION ORAL at 08:09

## 2024-09-11 RX ADMIN — OXACILLIN 12 G: 2 INJECTION, POWDER, FOR SOLUTION INTRAMUSCULAR; INTRAVENOUS at 09:09

## 2024-09-11 RX ADMIN — SODIUM CHLORIDE, POTASSIUM CHLORIDE, SODIUM LACTATE AND CALCIUM CHLORIDE 500 ML: 600; 310; 30; 20 INJECTION, SOLUTION INTRAVENOUS at 02:09

## 2024-09-11 RX ADMIN — INSULIN ASPART 16 UNITS: 100 INJECTION, SOLUTION INTRAVENOUS; SUBCUTANEOUS at 08:09

## 2024-09-11 RX ADMIN — ONDANSETRON 8 MG: 8 TABLET, ORALLY DISINTEGRATING ORAL at 01:09

## 2024-09-11 RX ADMIN — DOXYCYCLINE HYCLATE 100 MG: 100 TABLET, COATED ORAL at 09:09

## 2024-09-11 RX ADMIN — SODIUM CHLORIDE: 9 INJECTION, SOLUTION INTRAVENOUS at 12:09

## 2024-09-11 RX ADMIN — SODIUM CHLORIDE, POTASSIUM CHLORIDE, SODIUM LACTATE AND CALCIUM CHLORIDE: 600; 310; 30; 20 INJECTION, SOLUTION INTRAVENOUS at 02:09

## 2024-09-11 RX ADMIN — SODIUM CHLORIDE SOLN NEBU 3% 4 ML: 3 NEBU SOLN at 09:09

## 2024-09-11 RX ADMIN — GUAIFENESIN 600 MG: 600 TABLET, EXTENDED RELEASE ORAL at 09:09

## 2024-09-11 RX ADMIN — NORTRIPTYLINE HYDROCHLORIDE 50 MG: 25 CAPSULE ORAL at 09:09

## 2024-09-11 RX ADMIN — IPRATROPIUM BROMIDE AND ALBUTEROL SULFATE 3 ML: 2.5; .5 SOLUTION RESPIRATORY (INHALATION) at 01:09

## 2024-09-11 RX ADMIN — POTASSIUM CHLORIDE 40 MEQ: 1500 TABLET, EXTENDED RELEASE ORAL at 02:09

## 2024-09-11 RX ADMIN — IPRATROPIUM BROMIDE AND ALBUTEROL SULFATE 3 ML: 2.5; .5 SOLUTION RESPIRATORY (INHALATION) at 10:09

## 2024-09-11 RX ADMIN — Medication 16 G: at 09:09

## 2024-09-11 RX ADMIN — MORPHINE SULFATE 4 MG: 4 INJECTION INTRAVENOUS at 11:09

## 2024-09-11 RX ADMIN — GABAPENTIN 600 MG: 300 CAPSULE ORAL at 08:09

## 2024-09-11 RX ADMIN — INSULIN ASPART 12 UNITS: 100 INJECTION, SOLUTION INTRAVENOUS; SUBCUTANEOUS at 12:09

## 2024-09-11 RX ADMIN — SODIUM CHLORIDE SOLN NEBU 3% 4 ML: 3 NEBU SOLN at 10:09

## 2024-09-11 RX ADMIN — MORPHINE SULFATE 2 MG: 2 INJECTION, SOLUTION INTRAMUSCULAR; INTRAVENOUS at 08:09

## 2024-09-11 RX ADMIN — GUAIFENESIN 600 MG: 600 TABLET, EXTENDED RELEASE ORAL at 08:09

## 2024-09-11 RX ADMIN — ENOXAPARIN SODIUM 40 MG: 40 INJECTION SUBCUTANEOUS at 04:09

## 2024-09-11 NOTE — ASSESSMENT & PLAN NOTE
Cortes Schroeder is a 64 y.o. male with PMH of DM, HTN  and right trimalleolar ankle fracture status post ex fix on 07/18 with subsequent definitive fixation on 07/26 admitted with right ankle wound dehiscence in the setting of uncontrolled diabetes.      He is s/p I&D of R ankle 09/06. Repeat I&D R medial ankle 09/09.      VS: Tachycardic overnight; PE study negative   Labs: AM labs in progress   Diet: Ok for diet; strict BG control endocrine following   Pain control: multimodal regimen  PT/OT:  NWB RLE   DVT PPx: Lovenox 40  Abx:  oxacillin continuous; ID following   Cultures:  ankle cx staph +      Dispo: Plastics eval

## 2024-09-11 NOTE — ASSESSMENT & PLAN NOTE
64 year old with poorly controlled DM (A1C 8.5), recent fall resulting in left wrist fx and right ankle fracture 07/18/24 s/p ex fix Rt ankle and ORIF left wrist 07/19, and later subsequent ORIF right ankle on 7/26/24 with Dr. Liz -- now c/b deep surgical site infection of right ankle involving underlying hardware with associated bacteremia.   No SOI in left wrist.      S/p I&D on 09/06 and 9/9.  Op report noting purulence overlying hardware.  No plans for hardware removal at this time.  Unable to achieve primary closure and Plastic Surgery consulted.   A CTA of  lower extremity showed multifocal high grade stenosis throughout right calf, though no occlusion and Vascular Surgery consulted.   Plastics recommendations pending Vascular evaluation.      Surgical cxs +GBS, MSSA.  Blood cultures 9/6 and 9/7 + for MSSA.  Repeat blood cultures 9/8 NGTD, 9/11.  2D echo negative.   Currently on IV oxacillin.  Has been afebrile X 48 hours. Still with leukocytosis, though does not appear to be uptrending.     Recommendations / Plan:  Continue IV oxacillin 12 g q 24 hours continuous infusion.  Monitor liver enzymes.   Anticipate 6 weeks of IV antibiotics from date of most recent washout.    Once blood cxs remain cleared for 72hrs, will consider adding adjunctive rifampin (isolate is susceptible) rifampin for biofilm penetration if hardware is to remain.  Anticipate long term oral  antibiotic suppression following IV abx   Will follow.      ,Data reviewed and plan discussed with ID staff  Secure chat/Discussed above plan with Primary Team

## 2024-09-11 NOTE — ASSESSMENT & PLAN NOTE
Closed trimalleolar fracture of right ankle s/p ORIF in July  65 yo M presenting with AMS and worsening redness, swelling and pain to R ankle.     - continue IV vanc and cefepime  - Ortho consulted:   - taken to OR 09/06 for debridement of surgical wound with wound vac placed   - Repeat I&D of R medial ankle on 09/09   - recommending plastics eval for free flap  - follow wound cultures -- Staph aureus and GBS  - follow blood cultures -- Staph aureus.   - ID following:  - Continue IV oxacillin 12 g q 24 hours continuous infusion.  Monitor liver enzymes.   - Anticipate 6 weeks of IV antibiotics from date of most recent washout.    - Once blood cxs remain cleared for 72hrs, will consider adding adjunctive rifampin (isolate is susceptible) rifampin for biofilm penetration if hardware is to remain.  - Anticipate long term oral  antibiotic suppression following IV abx   - Will follow.    - Plastic consulted:   - CTA reviewed, given numerous areas of high grade stenosis, will need Vascular consult for possible angiogram to pre-optimiize blood flow prior to any free flap or pedicled propellar flap.  - continue optimization of BG, endocrinology on board  - ID on board, continue  IV abx. Current wbc remains elevated  - orthopedics on board to assess for source control, possible hardware removal.  - malnutition: will need optimization prior to OR. Prealbumin and transferrin low on last check.   - Plan: after medically optimized, plan for flap closure of wound. Will need formal vascular consult for possible angiogram given CTA RLE review.   - Vascular surgery consulted; appreciate recs

## 2024-09-11 NOTE — ASSESSMENT & PLAN NOTE
DEEPALI is likely due to pre-renal azotemia due to dehydration. Baseline creatinine is  1 . Most recent creatinine and eGFR are listed below.  Recent Labs     09/09/24  0252 09/10/24  0439 09/11/24  1041   CREATININE 1.6* 1.6* 2.1*   EGFRNORACEVR 48.1* 48.1* 34.7*        Plan  - DEEPALI is worsening. Will adjust treatment as follows: give additional fluids   - Avoid nephrotoxins and renally dose meds for GFR listed above  - Monitor urine output, serial BMP, and adjust therapy as needed  - likely from severe infection and receiving multiple imaging studies with contrast over the past 48 hrs. Will give a bolus and then start cIVFs

## 2024-09-11 NOTE — PLAN OF CARE
Pt free of trauma, falls, and injury. Pt VSS and afebrile throughout shift. Pt free of skin breakdown. Pt dressing is clean, dry, and intact.wound vac intact , r leg elevated.  Pt pain has been moderately controlled by IV pain meds and tolerated well. Pt refused to get put bed this shift. Pt has call light in reach, bed alarm on, bed brakes on, side rails up x2, bed in low position,  IS at bedside, and nonskid socks on. Pt lying in bed in no distress.          Problem: Adult Inpatient Plan of Care  Goal: Plan of Care Review  Outcome: Progressing  Goal: Patient-Specific Goal (Individualized)  Outcome: Progressing  Goal: Absence of Hospital-Acquired Illness or Injury  Outcome: Progressing  Goal: Optimal Comfort and Wellbeing  Outcome: Progressing  Goal: Readiness for Transition of Care  Outcome: Progressing     Problem: Fall Injury Risk  Goal: Absence of Fall and Fall-Related Injury  Outcome: Progressing     Problem: Sepsis/Septic Shock  Goal: Optimal Coping  Outcome: Progressing  Goal: Absence of Bleeding  Outcome: Progressing  Goal: Blood Glucose Level Within Targeted Range  Outcome: Progressing  Goal: Absence of Infection Signs and Symptoms  Outcome: Progressing  Goal: Optimal Nutrition Intake  Outcome: Progressing     Problem: Acute Kidney Injury/Impairment  Goal: Fluid and Electrolyte Balance  Outcome: Progressing  Goal: Improved Oral Intake  Outcome: Progressing  Goal: Effective Renal Function  Outcome: Progressing     Problem: Wound  Goal: Optimal Coping  Outcome: Progressing  Goal: Optimal Functional Ability  Outcome: Progressing  Goal: Absence of Infection Signs and Symptoms  Outcome: Progressing  Goal: Improved Oral Intake  Outcome: Progressing  Goal: Optimal Pain Control and Function  Outcome: Progressing  Goal: Skin Health and Integrity  Outcome: Progressing  Goal: Optimal Wound Healing  Outcome: Progressing     Problem: Diabetes Comorbidity  Goal: Blood Glucose Level Within Targeted Range  Outcome:  Progressing     Problem: Skin Injury Risk Increased  Goal: Skin Health and Integrity  Outcome: Progressing

## 2024-09-11 NOTE — CARE UPDATE
-Glucose Goal 140-180    -A1C:   Hemoglobin A1C   Date Value Ref Range Status   09/06/2024 8.5 (H) 4.0 - 5.6 % Final     Comment:     ADA Screening Guidelines:  5.7-6.4%  Consistent with prediabetes  >or=6.5%  Consistent with diabetes    High levels of fetal hemoglobin interfere with the HbA1C  assay. Heterozygous hemoglobin variants (HbS, HgC, etc)do  not significantly interfere with this assay.   However, presence of multiple variants may affect accuracy.           -HOME REGIMEN: Humalog via OmniPod insulin pump  Mounjaro 10 mg weekly     Current pump settings:  Basal 12 am to 2.3 and 6 am to 1.55  ICR to 3 at 12 am, 2 at 4 pm  ISF to 1:20   AIT 3 hrs  Target 110-120    -GLUCOSE TREND FOR THE PAST 24HRS:   Recent Labs   Lab 09/09/24  2053 09/10/24  0726 09/10/24  1127 09/10/24  1558 09/10/24  2134 09/11/24  0746   POCTGLUCOSE 182* 212* 254* 153* 205* 139*         -NO HYPOGYCEMIAS NOTED     - Diet  Diet diabetic 2000 Calorie    -TOLERATING 75 % OF PO DIET       Plan:   - Lantus (Insulin Glargine) 50 units nightly    - Novolog (Insulin Aspart) 12 units TIDWM (20% decrease given post-prandial BG below goal) (Hold if NPO) and prn for BG excursions [facility] SSI (150/25)   - BG checks AC/HS  - Hypoglycemia protocol in place     ** Please notify Endocrine for any change and/or advance in diet**  ** Please call Endocrine for any BG related issues **     Discharge Planning:   TBD. Please notify endocrinology prior to discharge.

## 2024-09-11 NOTE — PROGRESS NOTES
[facility] - Surgery  Infectious Disease  Progress Note    Patient Name: Cortes Schroeder  MRN: [id number]  Admission Date: 9/6/2024  Length of Stay: 5 days  Attending Physician: Filemon Miner MD  Primary Care Provider: Andrew Sinclair Jr., MD    Isolation Status: No active isolations  Assessment/Plan:        Surgical site infection    64 year old with poorly controlled DM (A1C 8.5), recent fall resulting in left wrist fx and right ankle fracture 07/18/24 s/p ex fix Rt ankle and ORIF left wrist 07/19, and later subsequent ORIF right ankle on 7/26/24 with Dr. Liz -- now c/b deep surgical site infection of right ankle involving underlying hardware with associated bacteremia.   No SOI in left wrist.      S/p I&D on 09/06 and 9/9.  Op report noting purulence overlying hardware.  No plans for hardware removal at this time.  Unable to achieve primary closure and Plastic Surgery consulted.   A CTA of  lower extremity showed multifocal high grade stenosis throughout right calf, though no occlusion and Vascular Surgery consulted.   Plastics recommendations pending Vascular evaluation.      Surgical cxs +GBS, MSSA.  Blood cultures 9/6 and 9/7 + for MSSA.  Repeat blood cultures 9/8 NGTD, 9/11.  2D echo negative.   Currently on IV oxacillin.  Has been afebrile X 48 hours. Still with leukocytosis, though does not appear to be uptrending.     Recommendations / Plan:  Continue IV oxacillin 12 g q 24 hours continuous infusion.  Monitor liver enzymes.   Anticipate 6 weeks of IV antibiotics from date of most recent washout.    Once blood cxs remain cleared for 72hrs, will consider adding adjunctive rifampin (isolate is susceptible) rifampin for biofilm penetration if hardware is to remain.  Anticipate long term oral  antibiotic suppression following IV abx   Will follow.      ID  MSSA bacteremia  See Assessment/plan above    Pulmonary  Cough    Persistent non-productive cough.  CTA chest negative for PE. Showed small RUL GGO.   O2 sats % on 3 liters nasal cannula.  Currently on IV levofloxacin.     Plan:   Procal ordered for am   Stop levofloxacin and start doxycycline 100 mg q 12 hours.         Data reviewed and plan discussed with ID staff  Secure chat/Discussed above plan with Primary Team      Subjective:     Principal Problem:Surgical wound breakdown    HPI: Cortes Schroeder is a 64 year old with HTN, poorly controlled DM (A1C 8.5), recent history of syncopal episodes, and  right ankle fracture on 7/18/24 s/p ex fix with subsequent ORIF on 7/26/24 with Dr. Liz.  At outpatient Orthopedic follow up surgical wound was healing and sutures removed.  Since then patient  reportedly doing well until about 1 week ago when he noticed some drainage from his surgical incisions.  Over the past week he developed fevers, chills, and night sweats.  Yesterday he became confused/altered and was brought to ED.  In ED was hypotensive, tachycardic, WBC 18, .  Sepsis protocol initiated and started on clindamycin has been doing well postoperatively until around 1 week ago when he started noticing some drainage from his surgical incisions. He started to develop fevers, chills and night sweats over the past week. Yesterday afternoon, patient became altered and was brought to the ED by his roommate. Patient currently lives in [place]. Upon presentation to the ED, patient was tachycardic, hypotensive and afebrile. WBC of 18.68, .3. Sepsis protocol initiated. Patient was started on clindamycin and vancomycin.  Now on vancomycin and cefepime.       Orthopedics consulted.  Noted draining wound over the medial side of the right ankle as well as eschar formation over the lateral side of the ankle.  Right ankle and foot noted to be erythematous and edematous.  Pending surgical I&D today.     I have reviewed hospital notes from  HM service and other specialty providers. I have also reviewed CBC, CMP/BMP,  cultures and imaging with my  interpretation as documented.      64M with poorly controlled DM (A1C 8.5), recent right ankle fracture 07/18/24 s/p ex fix with subsequent ORIF on 7/26/24 with Dr. Liz -- now c/b deep surgical site infection involving underlying hardware. Pt arrived S/p I&D 09/06. Op report noting purulence overlying hardware, cxs sent, gram stain +GPCs. Index bld cxs 09/06 +Staph aureus (BCID MRSA neg). Repeat bld cxs 09/07 NGTD. Post-op day 1 developed fever, with tachycardia 120, and ongoing AMS. Pt continuing on Iv-vanc and escalated cefepime to zosyn.    Ortho plans to return to OR Monday 09/09 for repeat I&D with Plastic surgery assistance for closure.    Recommendations / Plan:  Continue Iv-vanc and zosyn.   Will f/u repeat bld cxs 09/07 to ensure cleared  Will f/u surgical cxs and adjust abx accordingly  Recommend TTE  Anticipate abx duration of 6wks s/p 09/09, DON 10/21.      -- Discussed with ID staff and primary team   -- ID will continue to follow w/ further recs.          Interval History:  Repeat blood cultures NGTD  2D negative  Afebrile X 48 hours  WBC remains 21  Pain controlled.  Still with dry, irritating cough.  Nausea  Denies SOB    Review of Systems   Respiratory:  Positive for cough.    Gastrointestinal:  Positive for nausea.   Musculoskeletal:  Positive for arthralgias and myalgias.   Skin:  Positive for color change and wound.   All other systems reviewed and are negative.    Objective:     Vital Signs (Most Recent):  Temp: 98.6 °F (37 °C) (09/11/24 0735)  Pulse: 105 (09/11/24 0958)  Resp: 16 (09/11/24 1140)  BP: (!) 163/83 (09/11/24 0735)  SpO2: 97 % (09/11/24 0958) Vital Signs (24h Range):  Temp:  [97.5 °F (36.4 °C)-98.9 °F (37.2 °C)] 98.6 °F (37 °C)  Pulse:  [] 105  Resp:  [16-22] 16  SpO2:  [94 %-100 %] 97 %  BP: (121-163)/(71-83) 163/83     Weight: 93 kg (205 lb 0.4 oz)  Body mass index is 34.12 kg/m².    Estimated Creatinine Clearance: 37.7 mL/min (A) (based on SCr of 2.1 mg/dL (H)).      Physical Exam  Vitals and nursing note reviewed.   Constitutional:       General: He is not in acute distress.     Appearance: He is not toxic-appearing or diaphoretic.   HENT:      Nose: No congestion.      Mouth/Throat:      Pharynx: No oropharyngeal exudate.   Eyes:      General: No scleral icterus.     Conjunctiva/sclera: Conjunctivae normal.   Cardiovascular:      Rate and Rhythm: Normal rate and regular rhythm.      Heart sounds: No murmur heard.  Pulmonary:      Effort: Pulmonary effort is normal. No respiratory distress.      Breath sounds: No wheezing.      Comments: Bibasilar crackles    Abdominal:      General: There is no distension.      Palpations: Abdomen is soft.      Tenderness: There is no abdominal tenderness.   Musculoskeletal:      Cervical back: Neck supple. No tenderness.      Comments: Right ankle with surgical dressings in place + wound vac   Skin:     Findings: Erythema present.   Neurological:      Mental Status: He is oriented to person, place, and time.   Psychiatric:         Mood and Affect: Mood normal.         Behavior: Behavior normal.          Significant Labs: Blood Culture:   Recent Labs   Lab 09/06/24  0235 09/07/24  0910 09/08/24  1641 09/10/24  1711   LABBLOO Gram stain aer bottle: Gram positive cocci in clusters resembling Staph  Gram stain alicia bottle: Gram positive cocci in clusters resembling Staph  Results called to and read back by:Luciana Altamirano RN 09/06/2024  20:55  STAPHYLOCOCCUS AUREUS  For susceptibility see order #[id number]  *  Gram stain aer bottle: Gram positive cocci in clusters resembling Staph  Gram stain alicia bottle: Gram positive cocci in clusters resembling Staph  Results called to and read back by:Luciana Altamirano RN 09/06/2024  20:52  STAPHYLOCOCCUS AUREUS* Gram stain aer bottle: Gram positive cocci in clusters resembling Staph  Results called to and read back by: Roseanne Guevara RN 09/08/2024  14:07  Gram stain alicia bottle: Gram positive  cocci in clusters resembling Staph  Positive results previously called 09/08/2024  STAPHYLOCOCCUS AUREUS  ID consult required at [facility].  For susceptibility see order #[id number]  *  Gram stain aer bottle: Gram positive cocci in clusters resembling Staph  Gram stain alicia bottle: Gram positive cocci in clusters resembling Staph  Results called to and read back by:Luciana Altamirano LPN 09/08/2024  03:59  STAPHYLOCOCCUS AUREUS  ID consult required at [facility].  For susceptibility see order #[id number]  * No Growth to date  No Growth to date  No Growth to date  No Growth to date  No Growth to date  No Growth to date No Growth to date     CBC:   Recent Labs   Lab 09/10/24  0439 09/11/24  1041   WBC 20.10* 21.86*   HGB 8.4* 9.0*   HCT 25.9* 27.1*    482*     CMP:   Recent Labs   Lab 09/10/24  0439 09/11/24  1041   * 132*   K 3.9 3.4*    104   CO2 21* 18*   * 113*   BUN 22 30*   CREATININE 1.6* 2.1*   CALCIUM 8.1* 7.7*   PROT 5.4* 5.9*   ALBUMIN 1.3* 1.3*   BILITOT 0.4 0.4   ALKPHOS 314* 549*   AST 65* 151*   ALT 15 19   ANIONGAP 9 10     Microbiology Results (last 7 days)       Procedure Component Value Units Date/Time    Blood culture [[id number]] Collected: 09/10/24 1711    Order Status: Completed Specimen: Blood Updated: 09/11/24 0115     Blood Culture, Routine No Growth to date    Blood culture [[id number]] Collected: 09/08/24 1641    Order Status: Completed Specimen: Blood Updated: 09/10/24 2012     Blood Culture, Routine No Growth to date      No Growth to date      No Growth to date    Narrative:      [id number]    Blood culture [[id number]] Collected: 09/08/24 1641    Order Status: Completed Specimen: Blood Updated: 09/10/24 2012     Blood Culture, Routine No Growth to date      No Growth to date      No Growth to date    Narrative:      08271    Blood culture [[id number]]  (Abnormal) Collected: 09/07/24 0910     Order Status: Completed Specimen: Blood Updated: 09/10/24 1000     Blood Culture, Routine Gram stain aer bottle: Gram positive cocci in clusters resembling Staph      Results called to and read back by: Roseanne Guevara RN 09/08/2024  14:07      Gram stain alicia bottle: Gram positive cocci in clusters resembling Staph      Positive results previously called 09/08/2024      STAPHYLOCOCCUS AUREUS  ID consult required at [facility] and [facility] locations.  For susceptibility see order #[id number]      Blood culture [[id number]]  (Abnormal) Collected: 09/07/24 0910    Order Status: Completed Specimen: Blood Updated: 09/10/24 0959     Blood Culture, Routine Gram stain aer bottle: Gram positive cocci in clusters resembling Staph      Gram stain alicia bottle: Gram positive cocci in clusters resembling Staph      Results called to and read back by:Luciana Altamirano LPN 09/08/2024      03:59      STAPHYLOCOCCUS AUREUS  ID consult required at [facility] and [facility].  For susceptibility see order #[id number]      AFB Culture & Smear [[id number]] Collected: 09/06/24 1446    Order Status: Completed Specimen: Incision site from Ankle, Right Updated: 09/09/24 1545     AFB Culture & Smear Culture in progress     AFB CULTURE STAIN No acid fast bacilli seen.    Narrative:      Right ankle wound - culture    Aerobic culture [[id number]]  (Abnormal)  (Susceptibility) Collected: 09/06/24 1446    Order Status: Completed Specimen: Incision site from Ankle, Right Updated: 09/09/24 1431     Aerobic Bacterial Culture STAPHYLOCOCCUS AUREUS  Many        STREPTOCOCCUS AGALACTIAE (GROUP B)  Many  Beta-hemolytic streptococci are routinely susceptible to   penicillins,cephalosporins and carbapenems.      Narrative:      Right ankle wound - culture    Fungus culture [[id number]] Collected: 09/06/24 1446    Order Status: Completed Specimen: Incision site from Ankle, Right Updated: 09/09/24 1111     Fungus (Mycology) Culture  Culture in progress    Narrative:      Right ankle wound - culture    Blood culture x two cultures. Draw prior to antibiotics. [[id number]]  (Abnormal) Collected: 09/06/24 0235    Order Status: Completed Specimen: Blood from Peripheral, Antecubital, Left Updated: 09/08/24 0713     Blood Culture, Routine Gram stain aer bottle: Gram positive cocci in clusters resembling Staph      Gram stain alicia bottle: Gram positive cocci in clusters resembling Staph      Results called to and read back by:Luciana Altamirano RN 09/06/2024      20:55      STAPHYLOCOCCUS AUREUS  For susceptibility see order #[id number]      Narrative:      Aerobic and anaerobic    Blood culture x two cultures. Draw prior to antibiotics. [[id number]]  (Abnormal)  (Susceptibility) Collected: 09/06/24 0235    Order Status: Completed Specimen: Blood from Peripheral, Antecubital, Left Updated: 09/08/24 0711     Blood Culture, Routine Gram stain aer bottle: Gram positive cocci in clusters resembling Staph      Gram stain alicia bottle: Gram positive cocci in clusters resembling Staph      Results called to and read back by:Luciana Altamirano RN 09/06/2024      20:52      STAPHYLOCOCCUS AUREUS    Narrative:      Aerobic and anaerobic    Culture, Anaerobe [[id number]] Collected: 09/06/24 1446    Order Status: Completed Specimen: Incision site from Ankle, Right Updated: 09/07/24 0731     Anaerobic Culture Culture in progress    Narrative:      Right ankle wound - culture    MRSA/SA Rapid ID by PCR from Blood culture [[id number]]  (Abnormal) Collected: 09/06/24 0235    Order Status: Completed Updated: 09/06/24 2224     Staph aureus ID by PCR Positive     Methicillin Resistant ID by PCR Negative    Narrative:      Aerobic and anaerobic    Gram stain [[id number]] Collected: 09/06/24 1446    Order Status: Completed Specimen: Incision site from Ankle, Right Updated: 09/06/24 1805     Gram Stain Result Rare WBC's      Few Gram positive cocci    Narrative:      Right  ankle wound - culture          Wound Culture:   Recent Labs   Lab 09/06/24  1446   LABAERO STAPHYLOCOCCUS AUREUS  Many  *  STREPTOCOCCUS AGALACTIAE (GROUP B)  Many  Beta-hemolytic streptococci are routinely susceptible to   penicillins,cephalosporins and carbapenems.  *       Significant Imaging: I have reviewed all pertinent imaging results/findings within the past 24 hours.

## 2024-09-11 NOTE — SUBJECTIVE & OBJECTIVE
Interval History: NAEON. VSSAF. Satting well on 3L NC. DEEPALI worsened to 2.1 today. Patient seen this morning. Endorses cough, but difficulty coughing anything up. Also admits nausea which is affecting his appetite. R ankle pain currently 7/10. CTA RLE performed yesterday showing high grade stenosis of R calf arteries. Consulting vascular surgery per plastic recs.     Review of Systems   Constitutional:  Positive for activity change and fatigue. Negative for chills and fever.   Respiratory:  Positive for cough. Negative for chest tightness and shortness of breath.    Cardiovascular:  Negative for chest pain and leg swelling.   Gastrointestinal:  Positive for nausea. Negative for abdominal pain.   Musculoskeletal:  Positive for arthralgias.   Skin:  Positive for color change and wound.   Neurological:  Negative for dizziness and weakness.     Objective:     Vital Signs (Most Recent):  Temp: 98.6 °F (37 °C) (09/11/24 0735)  Pulse: 105 (09/11/24 0958)  Resp: 16 (09/11/24 1140)  BP: (!) 163/83 (09/11/24 0735)  SpO2: 97 % (09/11/24 0958) Vital Signs (24h Range):  Temp:  [97.5 °F (36.4 °C)-98.9 °F (37.2 °C)] 98.6 °F (37 °C)  Pulse:  [] 105  Resp:  [16-22] 16  SpO2:  [94 %-100 %] 97 %  BP: (121-163)/(71-83) 163/83     Weight: 93 kg (205 lb 0.4 oz)  Body mass index is 34.12 kg/m².    Intake/Output Summary (Last 24 hours) at 9/11/2024 1409  Last data filed at 9/10/2024 2254  Gross per 24 hour   Intake --   Output 200 ml   Net -200 ml         Physical Exam  Vitals and nursing note reviewed.   Constitutional:       Appearance: He is well-developed. He is obese. He is ill-appearing.   Eyes:      Pupils: Pupils are equal, round, and reactive to light.   Cardiovascular:      Rate and Rhythm: Regular rhythm. Tachycardia present.   Pulmonary:      Effort: Pulmonary effort is normal.      Breath sounds: Rales (bibasilar) present.      Comments: On 2L NC.  Abdominal:      Palpations: Abdomen is soft.      Tenderness: There is no  abdominal tenderness.   Musculoskeletal:         General: No tenderness.   Skin:     General: Skin is warm and dry.      Comments: C/d/I dressing to R ankle with wound vac.    Neurological:      Mental Status: He is alert and oriented to person, place, and time.   Psychiatric:         Behavior: Behavior normal.             Significant Labs: All pertinent labs within the past 24 hours have been reviewed.  CBC:   Recent Labs   Lab 09/10/24  0439 09/11/24  1041   WBC 20.10* 21.86*   HGB 8.4* 9.0*   HCT 25.9* 27.1*    482*     CMP:   Recent Labs   Lab 09/10/24  0439 09/11/24  1041   * 132*   K 3.9 3.4*    104   CO2 21* 18*   * 113*   BUN 22 30*   CREATININE 1.6* 2.1*   CALCIUM 8.1* 7.7*   PROT 5.4* 5.9*   ALBUMIN 1.3* 1.3*   BILITOT 0.4 0.4   ALKPHOS 314* 549*   AST 65* 151*   ALT 15 19   ANIONGAP 9 10       Significant Imaging: I have reviewed all pertinent imaging results/findings within the past 24 hours.

## 2024-09-11 NOTE — SUBJECTIVE & OBJECTIVE
Principal Problem:Surgical wound breakdown    Principal Orthopedic Problem: s/p I&D and wound vac placement on 09/06    Interval History:   Patient seen and examined at bedside. NAEON. Tachycardic overnight. Patient doing well. Pain moderately well controlled. States he is having some reflux symptoms after eating. CTA RLE consistent with multifocal high-grade stenoses throughout the right calf arteries. No arterial occlusion.          Review of patient's allergies indicates:   Allergen Reactions    Invokana [canagliflozin] Anaphylaxis    Percocet [oxycodone-acetaminophen] Nausea Only and Hallucinations    Biaxin [clarithromycin]     Hydrocodone Other (See Comments)     Dizzy/nausea/hallucinations    Sulfa (sulfonamide antibiotics) Nausea Only and Rash       Current Facility-Administered Medications   Medication    0.9%  NaCl infusion    acetaminophen tablet 1,000 mg    albuterol-ipratropium 2.5 mg-0.5 mg/3 mL nebulizer solution 3 mL    atorvastatin tablet 40 mg    dextrose 10% bolus 125 mL 125 mL    dextrose 10% bolus 125 mL 125 mL    dextrose 10% bolus 250 mL 250 mL    dextrose 10% bolus 250 mL 250 mL    emtricitabine-tenofovir 200-300 mg per tablet 1 tablet    enoxaparin injection 40 mg    gabapentin capsule 600 mg    And    gabapentin capsule 1,200 mg    glucagon (human recombinant) injection 1 mg    glucose chewable tablet 16 g    glucose chewable tablet 24 g    guaiFENesin 12 hr tablet 600 mg    insulin aspart U-100 pen 0-15 Units    insulin aspart U-100 pen 16 Units    insulin glargine U-100 (Lantus) pen 50 Units    levoFLOXacin 750 mg/150 mL IVPB 750 mg    morphine injection 2 mg    morphine injection 2 mg    morphine injection 4 mg    nortriptyline capsule 50 mg    ondansetron disintegrating tablet 8 mg    oxacillin 12 g in  mL CONTINUOUS INFUSION    polyethylene glycol packet 17 g    sodium chloride 3% nebulizer solution 4 mL     Objective:     Vital Signs (Most Recent):  Temp: 98.9 °F (37.2 °C)  "(09/11/24 0511)  Pulse: 106 (09/11/24 0511)  Resp: 18 (09/11/24 0511)  BP: (!) 141/71 (09/11/24 0511)  SpO2: 97 % (09/11/24 0511) Vital Signs (24h Range):  Temp:  [97.5 °F (36.4 °C)-98.9 °F (37.2 °C)] 98.9 °F (37.2 °C)  Pulse:  [] 106  Resp:  [16-19] 18  SpO2:  [94 %-100 %] 97 %  BP: (121-141)/(59-83) 141/71     Weight: 93 kg (205 lb 0.4 oz)  Height: 5' 5" (165.1 cm)  Body mass index is 34.12 kg/m².      Intake/Output Summary (Last 24 hours) at 9/11/2024 0645  Last data filed at 9/10/2024 2254  Gross per 24 hour   Intake --   Output 200 ml   Net -200 ml        Ortho/SPM Exam     AAOx4  NAD  Tachycardic  No increased WOB    RLE    Dressing C/D/I   Wound vac to good suction  Compartments soft/compressible  Reduced sensation to the dorsum of toe  Active toe dorsiflexion & plantarflexion  WWP. Brisk cap refill      Significant Labs:   No results for input(s): "WBC", "RBC", "HGB", "HCT", "PLT", "MCV", "MCH", "MCHC" in the last 24 hours.      Significant Imaging: I have reviewed and interpreted all pertinent imaging results/findings.  CTA RLE: Multifocal high-grade stenoses throughout the right calf arteries. No arterial occlusion.    CTA chest: No large central PE identified  "

## 2024-09-11 NOTE — SUBJECTIVE & OBJECTIVE
Interval History:  Repeat blood cultures NGTD  2D negative  Afebrile X 48 hours  WBC remains 21  Pain controlled.  Still with dry, irritating cough.  Nausea  Denies SOB    Review of Systems   Respiratory:  Positive for cough.    Gastrointestinal:  Positive for nausea.   Musculoskeletal:  Positive for arthralgias and myalgias.   Skin:  Positive for color change and wound.   All other systems reviewed and are negative.    Objective:     Vital Signs (Most Recent):  Temp: 98.6 °F (37 °C) (09/11/24 0735)  Pulse: 105 (09/11/24 0958)  Resp: 16 (09/11/24 1140)  BP: (!) 163/83 (09/11/24 0735)  SpO2: 97 % (09/11/24 0958) Vital Signs (24h Range):  Temp:  [97.5 °F (36.4 °C)-98.9 °F (37.2 °C)] 98.6 °F (37 °C)  Pulse:  [] 105  Resp:  [16-22] 16  SpO2:  [94 %-100 %] 97 %  BP: (121-163)/(71-83) 163/83     Weight: 93 kg (205 lb 0.4 oz)  Body mass index is 34.12 kg/m².    Estimated Creatinine Clearance: 37.7 mL/min (A) (based on SCr of 2.1 mg/dL (H)).     Physical Exam  Vitals and nursing note reviewed.   Constitutional:       General: He is not in acute distress.     Appearance: He is not toxic-appearing or diaphoretic.   HENT:      Nose: No congestion.      Mouth/Throat:      Pharynx: No oropharyngeal exudate.   Eyes:      General: No scleral icterus.     Conjunctiva/sclera: Conjunctivae normal.   Cardiovascular:      Rate and Rhythm: Normal rate and regular rhythm.      Heart sounds: No murmur heard.  Pulmonary:      Effort: Pulmonary effort is normal. No respiratory distress.      Breath sounds: No wheezing.      Comments: Bibasilar crackles    Abdominal:      General: There is no distension.      Palpations: Abdomen is soft.      Tenderness: There is no abdominal tenderness.   Musculoskeletal:      Cervical back: Neck supple. No tenderness.      Comments: Right ankle with surgical dressings in place + wound vac   Skin:     Findings: Erythema present.   Neurological:      Mental Status: He is oriented to person, place, and  time.   Psychiatric:         Mood and Affect: Mood normal.         Behavior: Behavior normal.          Significant Labs: Blood Culture:   Recent Labs   Lab 09/06/24  0235 09/07/24  0910 09/08/24  1641 09/10/24  1711   LABBLOO Gram stain aer bottle: Gram positive cocci in clusters resembling Staph  Gram stain alicia bottle: Gram positive cocci in clusters resembling Staph  Results called to and read back by:Luciana Altamirano RN 09/06/2024  20:55  STAPHYLOCOCCUS AUREUS  For susceptibility see order #[id number]  *  Gram stain aer bottle: Gram positive cocci in clusters resembling Staph  Gram stain alicia bottle: Gram positive cocci in clusters resembling Staph  Results called to and read back by:Luciana Altamirano RN 09/06/2024  20:52  STAPHYLOCOCCUS AUREUS* Gram stain aer bottle: Gram positive cocci in clusters resembling Staph  Results called to and read back by: Roseanne Guevara RN 09/08/2024  14:07  Gram stain alicia bottle: Gram positive cocci in clusters resembling Staph  Positive results previously called 09/08/2024  STAPHYLOCOCCUS AUREUS  ID consult required at [facility].  For susceptibility see order #[id number]  *  Gram stain aer bottle: Gram positive cocci in clusters resembling Staph  Gram stain alicia bottle: Gram positive cocci in clusters resembling Staph  Results called to and read back by:Luciana Altamirano LPN 09/08/2024  03:59  STAPHYLOCOCCUS AUREUS  ID consult required at [facility].  For susceptibility see order #[id number]  * No Growth to date  No Growth to date  No Growth to date  No Growth to date  No Growth to date  No Growth to date No Growth to date     CBC:   Recent Labs   Lab 09/10/24  0439 09/11/24  1041   WBC 20.10* 21.86*   HGB 8.4* 9.0*   HCT 25.9* 27.1*    482*     CMP:   Recent Labs   Lab 09/10/24  0439 09/11/24  1041   * 132*   K 3.9 3.4*    104   CO2 21* 18*   * 113*   BUN 22 30*    CREATININE 1.6* 2.1*   CALCIUM 8.1* 7.7*   PROT 5.4* 5.9*   ALBUMIN 1.3* 1.3*   BILITOT 0.4 0.4   ALKPHOS 314* 549*   AST 65* 151*   ALT 15 19   ANIONGAP 9 10     Microbiology Results (last 7 days)       Procedure Component Value Units Date/Time    Blood culture [[id number]] Collected: 09/10/24 1711    Order Status: Completed Specimen: Blood Updated: 09/11/24 0115     Blood Culture, Routine No Growth to date    Blood culture [[id number]] Collected: 09/08/24 1641    Order Status: Completed Specimen: Blood Updated: 09/10/24 2012     Blood Culture, Routine No Growth to date      No Growth to date      No Growth to date    Narrative:      [id number]    Blood culture [[id number]] Collected: 09/08/24 1641    Order Status: Completed Specimen: Blood Updated: 09/10/24 2012     Blood Culture, Routine No Growth to date      No Growth to date      No Growth to date    Narrative:      08271    Blood culture [[id number]]  (Abnormal) Collected: 09/07/24 0910    Order Status: Completed Specimen: Blood Updated: 09/10/24 1000     Blood Culture, Routine Gram stain aer bottle: Gram positive cocci in clusters resembling Staph      Results called to and read back by: Roseanne Guevara RN 09/08/2024  14:07      Gram stain alicia bottle: Gram positive cocci in clusters resembling Staph      Positive results previously called 09/08/2024      STAPHYLOCOCCUS AUREUS  ID consult required at [facility].[address] and [place] locations.  For susceptibility see order #[id number]      Blood culture [[id number]]  (Abnormal) Collected: 09/07/24 0910    Order Status: Completed Specimen: Blood Updated: 09/10/24 0959     Blood Culture, Routine Gram stain aer bottle: Gram positive cocci in clusters resembling Staph      Gram stain alicia bottle: Gram positive cocci in clusters resembling Staph      Results called to and read back by:Luciana Altamirano LPN 09/08/2024      03:59      STAPHYLOCOCCUS AUREUS  ID consult required at [facility].[address] [place]  locations.  For susceptibility see order #[id number]      AFB Culture & Smear [[id number]] Collected: 09/06/24 1446    Order Status: Completed Specimen: Incision site from Ankle, Right Updated: 09/09/24 1545     AFB Culture & Smear Culture in progress     AFB CULTURE STAIN No acid fast bacilli seen.    Narrative:      Right ankle wound - culture    Aerobic culture [[id number]]  (Abnormal)  (Susceptibility) Collected: 09/06/24 1446    Order Status: Completed Specimen: Incision site from Ankle, Right Updated: 09/09/24 1431     Aerobic Bacterial Culture STAPHYLOCOCCUS AUREUS  Many        STREPTOCOCCUS AGALACTIAE (GROUP B)  Many  Beta-hemolytic streptococci are routinely susceptible to   penicillins,cephalosporins and carbapenems.      Narrative:      Right ankle wound - culture    Fungus culture [[id number]] Collected: 09/06/24 1446    Order Status: Completed Specimen: Incision site from Ankle, Right Updated: 09/09/24 1111     Fungus (Mycology) Culture Culture in progress    Narrative:      Right ankle wound - culture    Blood culture x two cultures. Draw prior to antibiotics. [[id number]]  (Abnormal) Collected: 09/06/24 0235    Order Status: Completed Specimen: Blood from Peripheral, Antecubital, Left Updated: 09/08/24 0713     Blood Culture, Routine Gram stain aer bottle: Gram positive cocci in clusters resembling Staph      Gram stain alicia bottle: Gram positive cocci in clusters resembling Staph      Results called to and read back by:Luciana Altamirano RN 09/06/2024      20:55      STAPHYLOCOCCUS AUREUS  For susceptibility see order #[id number]      Narrative:      Aerobic and anaerobic    Blood culture x two cultures. Draw prior to antibiotics. [[id number]]  (Abnormal)  (Susceptibility) Collected: 09/06/24 0235    Order Status: Completed Specimen: Blood from Peripheral, Antecubital, Left Updated: 09/08/24 0711     Blood Culture, Routine Gram stain aer bottle: Gram positive cocci in clusters resembling Staph       Gram stain alicia bottle: Gram positive cocci in clusters resembling Staph      Results called to and read back by:Luciana Altamirano RN 09/06/2024      20:52      STAPHYLOCOCCUS AUREUS    Narrative:      Aerobic and anaerobic    Culture, Anaerobe [[id number]] Collected: 09/06/24 1446    Order Status: Completed Specimen: Incision site from Ankle, Right Updated: 09/07/24 0731     Anaerobic Culture Culture in progress    Narrative:      Right ankle wound - culture    MRSA/SA Rapid ID by PCR from Blood culture [[id number]]  (Abnormal) Collected: 09/06/24 0235    Order Status: Completed Updated: 09/06/24 2224     Staph aureus ID by PCR Positive     Methicillin Resistant ID by PCR Negative    Narrative:      Aerobic and anaerobic    Gram stain [[id number]] Collected: 09/06/24 1446    Order Status: Completed Specimen: Incision site from Ankle, Right Updated: 09/06/24 1805     Gram Stain Result Rare WBC's      Few Gram positive cocci    Narrative:      Right ankle wound - culture          Wound Culture:   Recent Labs   Lab 09/06/24  1446   LABAERO STAPHYLOCOCCUS AUREUS  Many  *  STREPTOCOCCUS AGALACTIAE (GROUP B)  Many  Beta-hemolytic streptococci are routinely susceptible to   penicillins,cephalosporins and carbapenems.  *       Significant Imaging: I have reviewed all pertinent imaging results/findings within the past 24 hours.

## 2024-09-11 NOTE — NURSING
Nurses Note -- 4 Eyes      9/10/2024   7:15 PM      Skin assessed during: Q Shift Change      [x] No Altered Skin Integrity Present    []Prevention Measures Documented      [] Yes- Altered Skin Integrity Present or Discovered   [] LDA Added if Not in Epic (Describe Wound)   [] New Altered Skin Integrity was Present on Admit and Documented in LDA   [] Wound Image Taken    Wound Care Consulted? No    Attending Nurse:  Lovely WINN     Second RN/Staff Member:  Patito PRITCHARD

## 2024-09-11 NOTE — ASSESSMENT & PLAN NOTE
Patient's FSGs are not controlled on current hypoglycemics.   Last A1c reviewed-   Lab Results   Component Value Date    LABA1C 10.8 (H) 08/15/2016    HGBA1C 8.5 (H) 09/06/2024     Most recent fingerstick glucose reviewed-   Recent Labs   Lab 09/10/24  1558 09/10/24  2134 09/11/24  0746 09/11/24  1114   POCTGLUCOSE 153* 205* 139* 107       Current correctional scale  Low  Maintain anti-hyperglycemic dose as follows-   Antihyperglycemics (From admission, onward)      Start     Stop Route Frequency Ordered    09/11/24 1200  insulin aspart U-100 pen 12 Units         -- SubQ 3 times daily with meals 09/11/24 1146    09/11/24 0959  insulin aspart U-100 pen 0-10 Units         -- SubQ Before meals & nightly PRN 09/11/24 0859    09/11/24 0900  insulin glargine U-100 (Lantus) pen 50 Units         -- SubQ Daily 09/10/24 0927           - Endocrine consulted:  - At this time, recommend that the patient remain off of his pump since he cannot be controlled in the Auto mode. Patient does not interact with pump frequently and relies on the auto mode algorithm.   - Lantus (Insulin Glargine) 50 units nightly    - Novolog (Insulin Aspart) 12 units TIDWM (20% decrease given post-prandial BG below goal) (Hold if NPO) and prn for BG excursions MDC SSI (150/25)   - hold oral antihyperglycemics during hospitalization   - needs better BG control for optimal wound healing

## 2024-09-11 NOTE — ASSESSMENT & PLAN NOTE
Patient with Hypoxic Respiratory failure which is Acute.  he is not on home oxygen. Supplemental oxygen was provided and noted-      .   Signs/symptoms of respiratory failure include- tachypnea and lethargy. Contributing diagnoses includes - Obesity Hypoventilation and Pneumonia Labs and images were reviewed. Patient Has not had a recent ABG. Will treat underlying causes and adjust management of respiratory failure as follows-     - currently on 2L NC, will wean O2 as tolerated  - repeat CXR without acute process  - CTA ordered by ortho/anesthesia prior to I&D. Negative for PE, but small ground glass area to RUL representing infectious vs. Inflammatory process.  - adding doxy for PNA coverage   - mucinex BID, acapella, and nebulized saline   - prn duo nebs

## 2024-09-11 NOTE — ASSESSMENT & PLAN NOTE
Hyponatremia is likely due to Dehydration/hypovolemia. The patient's most recent sodium results are listed below.  Recent Labs     09/09/24  0252 09/10/24  0439 09/11/24  1041   * 132* 132*       Plan  - Correct the sodium by 4-6mEq in 24 hours.   - Obtain the following studies: Urine sodium, urine osmolality, serum osmolality.  - Will treat the hyponatremia with IV fluids as follows: NS  - Monitor sodium Daily.   - Patient hyponatremia is improving

## 2024-09-11 NOTE — PROGRESS NOTES
[facility] Medicine  Progress Note    Patient Name: Cortes Schroeder  MRN: [id number]  Patient Class: IP- Inpatient   Admission Date: 9/6/2024  Length of Stay: 5 days  Attending Physician: Filemon Miner MD  Primary Care Provider: Andrew Sinclair Jr., MD        Subjective:     Principal Problem:Surgical wound breakdown        HPI:  Cortes Schroeder is a 64 y.o. M with PMHx of anxiety, T2DM, GERD, HLD, HTN who presented to ED for AMS. He had a fall in July that resulted in R trimalleolar fracture and L distal radius fracture. He had external fixation on 07/18 and then ORIF on 07/26 with Dr. Liz. He was prescribed clinda 300 mg on 08/28 for a ten day course. Patient was doing well when he acutely became altered and not acting like himself a few hours ago. Patient family member drove him here from [facility] for ortho consult. R medial ankle wound dehisced with redness and swelling around it. No CPM fever, cough, N/V/D or seizure.    In ED: T max 99.1. SIRS 2/4 with WBC 18 and . CMP notable for Na 127, Cr 1.7, . Phos 1.9. .3. BNP 73. Trop neg. A1c 8.5. R tib fib XR notes There are 2 abandoned anterior-posteriorly oriented pin tracks in the right mid tibial shaft.  On AP views, each of these pin tracks demonstrates a small faint internal density, possibly representing a sequestrum. Ortho and endocrine consulted. Given IV vanc and IV clindamycin. Given 2.7L IVFs. Admitted to hospital medicine for sepsis 2/2 infected hardware.     Overview/Hospital Course:  Cortes Schroeder is a 63 yo M admitted to hospital medicine for sepsis 2/2 R ankle infection s/p ORIF on 07/26. Started on IV vanc and cefepime. Given IVFs. Brought to OR with ortho on 09/06 for irrigation debridement of RLE. Endocrine following for BG. Was on vanc and zosyn. Echo without concern for vegetations. 2/2 Blood cultures and 2/2 repeats with Staph aureus. Wound cultures with Staph aureus and Group B strep.  Will follow cultures. ID consulted as suspect patient will need long course of abx; now changing to continuous oxacillin. CTA chest negative for PE, but notes small area of ground glass changes to RUL. Patient now with cough. Adding doxy for possible PNA. Ortho repeated I&D on 09/09. WBC remains elevated, but fever has resolved. Plastics consulted for flap eval. CTA RLE with moderate atherosclerosis and multifocal high grade stenosis throughout R calf arteries. Vascular surgery consulted per plastics recs as they would like to pre-optimiize blood flow prior to any free flap or pedicled propellar flap. RD consulted for malnutrition. Now with DEEPALI, but likely 2/2 infection and multiple images requiring contrast. Will need PT/OT consult prior to discharge.     Interval History: NAEON. VSSAF. Satting well on 3L NC. DEEPALI worsened to 2.1 today. Patient seen this morning. Endorses cough, but difficulty coughing anything up. Also admits nausea which is affecting his appetite. R ankle pain currently 7/10. CTA RLE performed yesterday showing high grade stenosis of R calf arteries. Consulting vascular surgery per plastic recs.     Review of Systems   Constitutional:  Positive for activity change and fatigue. Negative for chills and fever.   Respiratory:  Positive for cough. Negative for chest tightness and shortness of breath.    Cardiovascular:  Negative for chest pain and leg swelling.   Gastrointestinal:  Positive for nausea. Negative for abdominal pain.   Musculoskeletal:  Positive for arthralgias.   Skin:  Positive for color change and wound.   Neurological:  Negative for dizziness and weakness.     Objective:     Vital Signs (Most Recent):  Temp: 98.6 °F (37 °C) (09/11/24 0735)  Pulse: 105 (09/11/24 0958)  Resp: 16 (09/11/24 1140)  BP: (!) 163/83 (09/11/24 0735)  SpO2: 97 % (09/11/24 0958) Vital Signs (24h Range):  Temp:  [97.5 °F (36.4 °C)-98.9 °F (37.2 °C)] 98.6 °F (37 °C)  Pulse:  [] 105  Resp:  [16-22] 16  SpO2:   [94 %-100 %] 97 %  BP: (121-163)/(71-83) 163/83     Weight: 93 kg (205 lb 0.4 oz)  Body mass index is 34.12 kg/m².    Intake/Output Summary (Last 24 hours) at 9/11/2024 1409  Last data filed at 9/10/2024 2254  Gross per 24 hour   Intake --   Output 200 ml   Net -200 ml         Physical Exam  Vitals and nursing note reviewed.   Constitutional:       Appearance: He is well-developed. He is obese. He is ill-appearing.   Eyes:      Pupils: Pupils are equal, round, and reactive to light.   Cardiovascular:      Rate and Rhythm: Regular rhythm. Tachycardia present.   Pulmonary:      Effort: Pulmonary effort is normal.      Breath sounds: Rales (bibasilar) present.      Comments: On 2L NC.  Abdominal:      Palpations: Abdomen is soft.      Tenderness: There is no abdominal tenderness.   Musculoskeletal:         General: No tenderness.   Skin:     General: Skin is warm and dry.      Comments: C/d/I dressing to R ankle with wound vac.    Neurological:      Mental Status: He is alert and oriented to person, place, and time.   Psychiatric:         Behavior: Behavior normal.             Significant Labs: All pertinent labs within the past 24 hours have been reviewed.  CBC:   Recent Labs   Lab 09/10/24  0439 09/11/24  1041   WBC 20.10* 21.86*   HGB 8.4* 9.0*   HCT 25.9* 27.1*    482*     CMP:   Recent Labs   Lab 09/10/24  0439 09/11/24  1041   * 132*   K 3.9 3.4*    104   CO2 21* 18*   * 113*   BUN 22 30*   CREATININE 1.6* 2.1*   CALCIUM 8.1* 7.7*   PROT 5.4* 5.9*   ALBUMIN 1.3* 1.3*   BILITOT 0.4 0.4   ALKPHOS 314* 549*   AST 65* 151*   ALT 15 19   ANIONGAP 9 10       Significant Imaging: I have reviewed all pertinent imaging results/findings within the past 24 hours.    Assessment/Plan:      * Surgical wound breakdown  Closed trimalleolar fracture of right ankle s/p ORIF in July  65 yo M presenting with AMS and worsening redness, swelling and pain to R ankle.     - continue IV vanc and cefepime  -  Ortho consulted:   - taken to OR 09/06 for debridement of surgical wound with wound vac placed   - Repeat I&D of R medial ankle on 09/09   - recommending plastics eval for free flap  - follow wound cultures -- Staph aureus and GBS  - follow blood cultures -- Staph aureus.   - ID following:  - Continue IV oxacillin 12 g q 24 hours continuous infusion.  Monitor liver enzymes.   - Anticipate 6 weeks of IV antibiotics from date of most recent washout.    - Once blood cxs remain cleared for 72hrs, will consider adding adjunctive rifampin (isolate is susceptible) rifampin for biofilm penetration if hardware is to remain.  - Anticipate long term oral  antibiotic suppression following IV abx   - Will follow.    - Plastic consulted:   - CTA reviewed, given numerous areas of high grade stenosis, will need Vascular consult for possible angiogram to pre-optimiize blood flow prior to any free flap or pedicled propellar flap.  - continue optimization of BG, endocrinology on board  - ID on board, continue  IV abx. Current wbc remains elevated  - orthopedics on board to assess for source control, possible hardware removal.  - malnutition: will need optimization prior to OR. Prealbumin and transferrin low on last check.   - Plan: after medically optimized, plan for flap closure of wound. Will need formal vascular consult for possible angiogram given CTA RLE review.   - Vascular surgery consulted; appreciate recs     MSSA bacteremia  - 2/2 blood cx with Staph aureus. 2/2 repeats positive. Repeats pending but NGTD  - Follow repeat blood cx  - ID following: start continuous oxacillin   - echo without concern for vegetations     Sepsis  This patient does have evidence of infective focus  My overall impression is sepsis.  Source: Skin and Soft Tissue (location ankle)  Antibiotics given-   Antibiotics (72h ago, onward)      Start     Stop Route Frequency Ordered    09/11/24 2100  doxycycline tablet 100 mg         -- Oral Every 12 hours  09/11/24 1223    09/09/24 1730  oxacillin 12 g in  mL CONTINUOUS INFUSION         -- IV Every 24 hours (non-standard times) 09/09/24 1630          Latest lactate reviewed-  Recent Labs   Lab 09/08/24  1641   LACTATE 1.7       Organ dysfunction indicated by Acute kidney injury and Encephalopathy    Fluid challenge Actual Body weight- Patient will receive 30ml/kg actual body weight to calculate fluid bolus for treatment of septic shock.     Post- resuscitation assessment No - Post resuscitation assessment not needed     Will Not start Pressors-   Source control achieved by: see above    Transaminitis  - liver enzymes uptrending likely 2/2 severe infection  - will hold truvada for now  - monitor with daily CMP    On pre-exposure prophylaxis for HIV  - holding truvada for transaminitis     Hyponatremia  Hyponatremia is likely due to Dehydration/hypovolemia. The patient's most recent sodium results are listed below.  Recent Labs     09/09/24  0252 09/10/24  0439 09/11/24  1041   * 132* 132*       Plan  - Correct the sodium by 4-6mEq in 24 hours.   - Obtain the following studies: Urine sodium, urine osmolality, serum osmolality.  - Will treat the hyponatremia with IV fluids as follows: NS  - Monitor sodium Daily.   - Patient hyponatremia is improving    Uncontrolled diabetes mellitus with hyperglycemia  Patient's FSGs are not controlled on current hypoglycemics.   Last A1c reviewed-   Lab Results   Component Value Date    LABA1C 10.8 (H) 08/15/2016    HGBA1C 8.5 (H) 09/06/2024     Most recent fingerstick glucose reviewed-   Recent Labs   Lab 09/10/24  1558 09/10/24  2134 09/11/24  0746 09/11/24  1114   POCTGLUCOSE 153* 205* 139* 107       Current correctional scale  Low  Maintain anti-hyperglycemic dose as follows-   Antihyperglycemics (From admission, onward)      Start     Stop Route Frequency Ordered    09/11/24 1200  insulin aspart U-100 pen 12 Units         -- SubQ 3 times daily with meals 09/11/24 1140     09/11/24 0959  insulin aspart U-100 pen 0-10 Units         -- SubQ Before meals & nightly PRN 09/11/24 0859    09/11/24 0900  insulin glargine U-100 (Lantus) pen 50 Units         -- SubQ Daily 09/10/24 0927           - Endocrine consulted:  - At this time, recommend that the patient remain off of his pump since he cannot be controlled in the Auto mode. Patient does not interact with pump frequently and relies on the auto mode algorithm.   - Lantus (Insulin Glargine) 50 units nightly    - Novolog (Insulin Aspart) 12 units TIDWM (20% decrease given post-prandial BG below goal) (Hold if NPO) and prn for BG excursions MDC SSI (150/25)   - hold oral antihyperglycemics during hospitalization   - needs better BG control for optimal wound healing     Closed trimalleolar fracture of right ankle  S/p ORIF 07/2024  Ortho consulted   - see surgical wound breakdown above    DEEPALI (acute kidney injury)  DEEPALI is likely due to pre-renal azotemia due to dehydration. Baseline creatinine is  1 . Most recent creatinine and eGFR are listed below.  Recent Labs     09/09/24  0252 09/10/24  0439 09/11/24  1041   CREATININE 1.6* 1.6* 2.1*   EGFRNORACEVR 48.1* 48.1* 34.7*        Plan  - DEEPALI is worsening. Will adjust treatment as follows: give additional fluids   - Avoid nephrotoxins and renally dose meds for GFR listed above  - Monitor urine output, serial BMP, and adjust therapy as needed  - likely from severe infection and receiving multiple imaging studies with contrast over the past 48 hrs. Will give a bolus and then start cIVFs    Acute hypoxemic respiratory failure  Patient with Hypoxic Respiratory failure which is Acute.  he is not on home oxygen. Supplemental oxygen was provided and noted-      .   Signs/symptoms of respiratory failure include- tachypnea and lethargy. Contributing diagnoses includes - Obesity Hypoventilation and Pneumonia Labs and images were reviewed. Patient Has not had a recent ABG. Will treat underlying causes  "and adjust management of respiratory failure as follows-     - currently on 2L NC, will wean O2 as tolerated  - repeat CXR without acute process  - CTA ordered by ortho/anesthesia prior to I&D. Negative for PE, but small ground glass area to RUL representing infectious vs. Inflammatory process.  - adding doxy for PNA coverage   - mucinex BID, acapella, and nebulized saline   - prn duo nebs     Uncontrolled type 2 diabetes mellitus with diabetic polyneuropathy, with long-term current use of insulin  Patient's FSGs are uncontrolled due to hyperglycemia on current medication regimen.  Last A1c reviewed-   Lab Results   Component Value Date    LABA1C 10.8 (H) 08/15/2016    HGBA1C 9.9 (H) 07/18/2024     Most recent fingerstick glucose reviewed- No results for input(s): "POCTGLUCOSE" in the last 24 hours.  Current correctional scale  Low  Maintain anti-hyperglycemic dose as follows-   Antihyperglycemics (From admission, onward)      Start     Stop Route Frequency Ordered    09/06/24 0506  insulin aspart U-100 pen 0-5 Units         -- SubQ Every 6 hours PRN 09/06/24 0406          Hold Oral hypoglycemics while patient is in the hospital.      VTE Risk Mitigation (From admission, onward)           Ordered     enoxaparin injection 40 mg  Daily         09/10/24 0601     IP VTE HIGH RISK PATIENT  Once         09/06/24 1735     Place sequential compression device  Until discontinued         09/06/24 1249                    Discharge Planning   JAYLEEN: 9/16/2024     Code Status: Full Code   Is the patient medically ready for discharge?:     Reason for patient still in hospital (select all that apply): Patient trending condition, Laboratory test, Treatment, Imaging, and Consult recommendations  Discharge Plan A: Home Health                  Jennifer Ochoa PA-C  Department of Hospital Medicine   [facility] - Surgery    "

## 2024-09-11 NOTE — ASSESSMENT & PLAN NOTE
Persistent non-productive cough.  CTA chest negative for PE. Showed small RUL GGO.  O2 sats % on 3 liters nasal cannula.  Currently on IV levofloxacin.     Plan:   Procal ordered for am   Stop levofloxacin and start doxycycline 100 mg q 12 hours.

## 2024-09-11 NOTE — PROGRESS NOTES
[facility]  Orthopedics  Progress Note    Attg Note:  I agree with the resident's assessment and plan.  Vasculature not great on CTA.  Stick surgery and now vascular surgery on board to try to increase blood flow and see if the patient is a candidate for a free flap.    Moe Liz MD      Patient Name: Cortes Schroeder  MRN: [id number]  Admission Date: 9/6/2024  Hospital Length of Stay: 5 days  Attending Provider: Filemon Miner MD  Primary Care Provider: Andrew Sinclair Jr., MD  Follow-up For: Procedure(s) (LRB):  IRRIGATION AND DEBRIDEMENT, LOWER EXTREMITY - WITH WOUND VAC CHANGE, RIGHT ANKLE (Right)  ARTHROTOMY, ANKLE (Right)    Post-Operative Day: 2 Days Post-Op  Subjective:     Principal Problem:Surgical wound breakdown    Principal Orthopedic Problem: s/p I&D and wound vac placement on 09/06    Interval History:   Patient seen and examined at bedside. NAEON. Tachycardic overnight. Patient doing well. Pain moderately well controlled. States he is having some reflux symptoms after eating. CTA RLE consistent with multifocal high-grade stenoses throughout the right calf arteries. No arterial occlusion.          Review of patient's allergies indicates:   Allergen Reactions    Invokana [canagliflozin] Anaphylaxis    Percocet [oxycodone-acetaminophen] Nausea Only and Hallucinations    Biaxin [clarithromycin]     Hydrocodone Other (See Comments)     Dizzy/nausea/hallucinations    Sulfa (sulfonamide antibiotics) Nausea Only and Rash       Current Facility-Administered Medications   Medication    0.9%  NaCl infusion    acetaminophen tablet 1,000 mg    albuterol-ipratropium 2.5 mg-0.5 mg/3 mL nebulizer solution 3 mL    atorvastatin tablet 40 mg    dextrose 10% bolus 125 mL 125 mL    dextrose 10% bolus 125 mL 125 mL    dextrose 10% bolus 250 mL 250 mL    dextrose 10% bolus 250 mL 250 mL    emtricitabine-tenofovir 200-300 mg per tablet 1 tablet    enoxaparin injection 40 mg    gabapentin capsule 600 mg     "And    gabapentin capsule 1,200 mg    glucagon (human recombinant) injection 1 mg    glucose chewable tablet 16 g    glucose chewable tablet 24 g    guaiFENesin 12 hr tablet 600 mg    insulin aspart U-100 pen 0-15 Units    insulin aspart U-100 pen 16 Units    insulin glargine U-100 (Lantus) pen 50 Units    levoFLOXacin 750 mg/150 mL IVPB 750 mg    morphine injection 2 mg    morphine injection 2 mg    morphine injection 4 mg    nortriptyline capsule 50 mg    ondansetron disintegrating tablet 8 mg    oxacillin 12 g in  mL CONTINUOUS INFUSION    polyethylene glycol packet 17 g    sodium chloride 3% nebulizer solution 4 mL     Objective:     Vital Signs (Most Recent):  Temp: 98.9 °F (37.2 °C) (09/11/24 0511)  Pulse: 106 (09/11/24 0511)  Resp: 18 (09/11/24 0511)  BP: (!) 141/71 (09/11/24 0511)  SpO2: 97 % (09/11/24 0511) Vital Signs (24h Range):  Temp:  [97.5 °F (36.4 °C)-98.9 °F (37.2 °C)] 98.9 °F (37.2 °C)  Pulse:  [] 106  Resp:  [16-19] 18  SpO2:  [94 %-100 %] 97 %  BP: (121-141)/(59-83) 141/71     Weight: 93 kg (205 lb 0.4 oz)  Height: 5' 5" (165.1 cm)  Body mass index is 34.12 kg/m².      Intake/Output Summary (Last 24 hours) at 9/11/2024 0645  Last data filed at 9/10/2024 2254  Gross per 24 hour   Intake --   Output 200 ml   Net -200 ml        Ortho/SPM Exam     AAOx4  NAD  Tachycardic  No increased WOB    RLE    Dressing C/D/I   Wound vac to good suction  Compartments soft/compressible  Reduced sensation to the dorsum of toe  Active toe dorsiflexion & plantarflexion  WWP. Brisk cap refill      Significant Labs:   No results for input(s): "WBC", "RBC", "HGB", "HCT", "PLT", "MCV", "MCH", "MCHC" in the last 24 hours.      Significant Imaging: I have reviewed and interpreted all pertinent imaging results/findings.  CTA RLE: Multifocal high-grade stenoses throughout the right calf arteries. No arterial occlusion.    CTA chest: No large central PE identified  Assessment/Plan:     * Surgical wound " breakdown  Cortes Schroeder is a 64 y.o. male with PMH of DM, HTN  and right trimalleolar ankle fracture status post ex fix on 07/18 with subsequent definitive fixation on 07/26 admitted with right ankle wound dehiscence in the setting of uncontrolled diabetes.      He is s/p I&D of R ankle 09/06. Repeat I&D R medial ankle 09/09.      VS: Tachycardic overnight; PE study negative   Labs: AM labs in progress   Diet: Ok for diet; strict BG control endocrine following   Pain control: multimodal regimen  PT/OT:  NWB RLE   DVT PPx: Lovenox 40  Abx:  oxacillin continuous; ID following   Cultures:  ankle cx staph +      Dispo: Plastics antoine Price MD  Orthopedics  [facility] - Surgery

## 2024-09-11 NOTE — ASSESSMENT & PLAN NOTE
This patient does have evidence of infective focus  My overall impression is sepsis.  Source: Skin and Soft Tissue (location ankle)  Antibiotics given-   Antibiotics (72h ago, onward)    Start     Stop Route Frequency Ordered    09/11/24 2100  doxycycline tablet 100 mg         -- Oral Every 12 hours 09/11/24 1223    09/09/24 1730  oxacillin 12 g in  mL CONTINUOUS INFUSION         -- IV Every 24 hours (non-standard times) 09/09/24 1630        Latest lactate reviewed-  Recent Labs   Lab 09/08/24  1641   LACTATE 1.7       Organ dysfunction indicated by Acute kidney injury and Encephalopathy    Fluid challenge Actual Body weight- Patient will receive 30ml/kg actual body weight to calculate fluid bolus for treatment of septic shock.     Post- resuscitation assessment No - Post resuscitation assessment not needed     Will Not start Pressors-   Source control achieved by: see above

## 2024-09-11 NOTE — CONSULTS
.Plastic and Reconstructive Surgery   Consult Note    Date of Consultation:   09/11/2024    Reason for Consultation:  RLE ankle wound    History of Present Illness:  64yoM w/ PMI of DM2 (A1c 8.5), GERD, HLD, HTN who had a self reported syncopal event in July 2024 and suffered a fall. When he arose, taken to hospital and found to have a R trimalloelar ankle fx s/p Ex-fix 7/18 due to soft tissue concerns followed by ORIF 7/26. Per documentation, became altered again recently and was found to have a wound dehiscence of his Rt ankle s/p washout  and debridement with wound vac on 9/6/24 and 9/9/24. Reported to have purulence along hardware. ID is currently on board treating his active infection. Endocrinology is also on board managing his uncontrolled DM2    The patient denies smoking history. Denies claudication symptoms or history of PVD/PAD, MI, Stroke. Denies blood thinners. Denies hx of RLE surgery except for recent ankle fx. The patient states he does not have the cause of his syncopal event determined yet.    Past Medical History:    has a past medical history of Anxiety (12/18/2012), Back pain (12/18/2012), Cataract, Chronic pain syndrome (04/24/2016), Diabetes type 2, controlled (02/20/2016), Diabetic retinopathy, DM (diabetes mellitus) (12/18/2012), DM (diabetes mellitus), type 2, uncontrolled (11/16/2013), Gastroesophageal reflux disease without esophagitis (02/20/2016), Hyperlipidemia (12/18/2012), Insomnia (08/07/2014), and Neuropathy (11/16/2013).    Past Surgical History:    has a past surgical history that includes Back surgery (2003); Epidural steroid injection (N/A, 1/16/2019); Epidural steroid injection (N/A, 2/20/2019); Cataract extraction; injection, spine, lumbosacral, transforaminal approach (Bilateral, 6/14/2024); application, external fixation device, for ankle fracture (Right, 7/18/2024); closed reduction, fracture, ankle, trimalleolar (Right, 7/18/2024); Open reduction and internal fixation  (ORIF) of fracture of distal radius (Left, 7/19/2024); Open reduction and internal fixation (ORIF) of injury of ankle (Right, 7/26/2024); Removal of external fixation device (Right, 7/26/2024); Fixation of syndesmosis of ankle (Right, 7/26/2024); Application of wound vacuum-assisted closure device (Right, 9/6/2024); irrigation and debridement (Right, 9/6/2024); Irrigation and debridement of lower extremity (Right, 9/9/2024); and Arthrotomy of ankle (Right, 9/9/2024).    Social History:  Social History     Tobacco Use    Smoking status: Never    Smokeless tobacco: Never   Substance Use Topics    Alcohol use: Yes     Alcohol/week: 1.0 standard drink of alcohol     Types: 1 Glasses of wine per week     Comment: occasionally     Social History     Substance and Sexual Activity   Drug Use No       Family History:  Family History   Problem Relation Name Age of Onset    Cataracts Father      Hypertension Father      Parkinsonism Father      Alzheimer's disease Father      Migraines Mother      Hypertension Mother      Heart attack Mother      Amblyopia Neg Hx      Blindness Neg Hx      Glaucoma Neg Hx      Macular degeneration Neg Hx      Retinal detachment Neg Hx      Strabismus Neg Hx         Allergies:  Review of patient's allergies indicates:   Allergen Reactions    Invokana [canagliflozin] Anaphylaxis    Percocet [oxycodone-acetaminophen] Nausea Only and Hallucinations    Biaxin [clarithromycin]     Hydrocodone Other (See Comments)     Dizzy/nausea/hallucinations    Sulfa (sulfonamide antibiotics) Nausea Only and Rash       Home Medications:  Prior to Admission medications    Medication Sig Start Date End Date Taking? Authorizing Provider   acetaminophen (TYLENOL) 325 MG tablet Take 2 tablets (650 mg total) by mouth every 6 (six) hours. 7/23/24   Maria Del Rosario Medina MD   aspirin (ECOTRIN) 81 MG EC tablet Take 1 tablet (81 mg total) by mouth 2 (two) times a day. End date sept 20, 2024 7/23/24 7/23/25  Maria Del Rosario Medina  MD CHRISTIAN   atorvastatin (LIPITOR) 40 MG tablet Take 40 mg by mouth once daily.    Provider, Historical   blood-glucose sensor (DEXCOM G6 SENSOR) Omaira Change sensor every 10 days 1/20/21 1/20/22  Tavares Williamson MD   blood-glucose transmitter (DEXCOM G6 TRANSMITTER) Omaira Change transmitter every 3 months 1/20/21 1/20/22  Tavares Williamson MD   celecoxib (CELEBREX) 200 MG capsule Take 1 capsule (200 mg total) by mouth once daily. 7/23/24   Maria Del Rosario Medina MD   cyanocobalamin (VITAMIN B-12) 1000 MCG tablet Take 1,000 mcg by mouth once daily.    Provider, Historical   DULoxetine (CYMBALTA) 60 MG capsule Take 1 capsule (60 mg total) by mouth once daily. 11/30/22   Don Artis MD   emtricitabine-tenofovir 200-300 mg (TRUVADA) 200-300 mg Tab Take 1 tablet by mouth once daily. 2/8/23   Don Artis MD   enoxaparin (LOVENOX) 40 mg/0.4 mL Syrg Inject 40 mg into the skin Daily.    Provider, Historical   fish oil-omega-3 fatty acids 300-1,000 mg capsule Take 1 capsule by mouth 2 (two) times daily.    Provider, Historical   gabapentin (NEURONTIN) 300 MG capsule Take 2 capsules (600 mg total) by mouth once daily AND 4 capsules (1,200 mg total) every evening. 7/23/24   Maria Del Rosario Medina MD   HUMALOG U-100 INSULIN 100 unit/mL injection 1:20 U PRN high blood sugar and snacks. Correction dose- Enter carb coverage intake upon administration  Target number 120 Carbohydrate coverage #1 1:2 Carbohydrate coverage #1 time [id number] Carbohydrate coverage #2 1:3 Carbohydrate coverage #2 time [id number] Carbohydrate coverage #3 Carbohydrate coverage #3 time Carbohydrate coverage #4 Carbohydrate coverage #4 time Sensitivity #1 1:20 Sensitivity #1 time 4268-5682 Sensitivity #2 Sensitivity #2 time Sensitivity #3 Sensitivity #3 time Sensitivity #4 Sensitivity #4 time Sensitivity #5 Sensitivity #5 time Order Questions Question Answer Comment       50 U SQ continuous  Target number 120 Basal Rate #1 2.3 Basal rate #1 time [id number]  Basal Rate #2 1.55 Basal rate #2 time 6396-3110 Basal rate #3 Basal rate #3 time Basal rate #4 Basal rate #4 time Basal rate #5 Basal rate #5 time 7/23/24   Maria Del Rosario Medina MD   losartan (COZAAR) 25 MG tablet Take 1 tablet (25 mg total) by mouth once daily. 7/23/24 7/23/25  Maria Del Rosario Medina MD   magnesium oxide (MAG-OX) 400 mg (241.3 mg magnesium) tablet Take 0.5 tablets (200 mg total) by mouth once daily. 7/23/24   Maria Del Rosario Medina MD   melatonin (MELATIN) 3 mg tablet Take 2 tablets (6 mg total) by mouth nightly as needed for Insomnia. 7/23/24   Maria Del Rosario Medina MD   methocarbamoL (ROBAXIN) 500 MG Tab Take 1 tablet (500 mg total) by mouth 4 (four) times daily as needed (pain scale 4-7). 7/23/24   Maria Del Rosario Medina MD   morphine (MSIR) 15 MG tablet Take 1 tablet (15 mg total) by mouth every 4 (four) hours as needed (pain scale 6-10). 7/23/24   Maria Del Rosario Medina MD   MOUNJARO 10 mg/0.5 mL PnIj Inject 10 mg into the skin once a week. HOLD until all surgeries completed and out of rehab 7/23/24   Maria Del Rosario Medina MD   nortriptyline (PAMELOR) 50 MG capsule Take 1 capsule (50 mg total) by mouth nightly. 11/30/22   Dno Artis MD   ondansetron (ZOFRAN-ODT) 4 MG TbDL Take 2 tablets (8 mg total) by mouth every 6 (six) hours as needed (nausea). 7/23/24   Maria Del Rosario Medina MD   polyethylene glycol (GLYCOLAX) 17 gram PwPk Take 17 g by mouth once daily. 7/23/24   Maria Del Rosario Medina MD   rosuvastatin (CRESTOR) 10 MG tablet Take 10 mg by mouth every evening.    Provider, Historical   senna-docusate 8.6-50 mg (PERICOLACE) 8.6-50 mg per tablet Take 1 tablet by mouth 2 (two) times daily. 7/23/24   Maria Del Rosario Medina MD   vitamin D 1000 units Tab Take 1,000 Units by mouth 2 (two) times daily.    Provider, Historical   insulin aspart U-100 (NOVOLOG U-100 INSULIN ASPART) 100 unit/mL injection Use in insulin pump. Max daily dose 150 units. 9/15/20 9/18/20  Violetta Woods, PRECIOUS       Review of  Systems:  Negative except for what is noted in HPI    Physical Exam:  VITAL SIGNS:   Vitals:    24 0140 24 0359 24 0432 24 0511   BP:    (!) 141/71   BP Location:    Right arm   Patient Position:    Lying   Pulse: 109 106 104 106   Resp: 16   18   Temp:    98.9 °F (37.2 °C)   TempSrc:    Oral   SpO2: 95%   97%   Weight:       Height:         TMAX: Temp (24hrs), Av.3 °F (36.8 °C), Min:97.5 °F (36.4 °C), Max:98.9 °F (37.2 °C)    General: Alert; No acute distress  Cardiovascular: Regular rate   Respiratory: Normal respiratory effort. Chest rise symmetric.   Abdomen: Soft, nontender, nondistended  Extremity: RLE in posterior slab splint. Softly palpable Pop. Unable to assess Rt dp/pt. Cap refill on distal toes 2-3 secs, somewhat cool to touch.  Neurologic: No focal deficit. Speech normal         Diagnostic Data:  Recent Results (from the past 336 hour(s))   CBC Auto Differential    Collection Time: 24  5:19 AM   Result Value Ref Range    WBC 17.10 (H) 3.90 - 12.70 K/uL    Hemoglobin 10.1 (L) 14.0 - 18.0 g/dL    Hematocrit 30.6 (L) 40.0 - 54.0 %    Platelets 359 150 - 450 K/uL   CBC auto differential    Collection Time: 24  2:35 AM   Result Value Ref Range    WBC 18.68 (H) 3.90 - 12.70 K/uL    Hemoglobin 11.2 (L) 14.0 - 18.0 g/dL    Hematocrit 32.8 (L) 40.0 - 54.0 %    Platelets 327 150 - 450 K/uL     Recent Results (from the past 336 hour(s))   Basic metabolic panel    Collection Time: 24  5:19 AM   Result Value Ref Range    Sodium 132 (L) 136 - 145 mmol/L    Potassium 4.0 3.5 - 5.1 mmol/L    Chloride 104 95 - 110 mmol/L    CO2 18 (L) 23 - 29 mmol/L    BUN 23 8 - 23 mg/dL    Creatinine 1.2 0.5 - 1.4 mg/dL    Calcium 8.5 (L) 8.7 - 10.5 mg/dL    Anion Gap 10 8 - 16 mmol/L     Lab Results   Component Value Date    ALBUMIN 1.3 (L) 09/10/2024     Lab Results   Component Value Date    .3 (H) 2024     Lab Results   Component Value Date    INR 1.0 2024     No  "results found for: "PTT"    Microbiology Results (last 7 days)       Procedure Component Value Units Date/Time    Blood culture [[id number]] Collected: 09/10/24 1711    Order Status: Completed Specimen: Blood Updated: 09/11/24 0115     Blood Culture, Routine No Growth to date    Blood culture [[id number]] Collected: 09/08/24 1641    Order Status: Completed Specimen: Blood Updated: 09/10/24 2012     Blood Culture, Routine No Growth to date      No Growth to date      No Growth to date    Narrative:      [id number]    Blood culture [[id number]] Collected: 09/08/24 1641    Order Status: Completed Specimen: Blood Updated: 09/10/24 2012     Blood Culture, Routine No Growth to date      No Growth to date      No Growth to date    Narrative:      08271    Blood culture [[id number]]  (Abnormal) Collected: 09/07/24 0910    Order Status: Completed Specimen: Blood Updated: 09/10/24 1000     Blood Culture, Routine Gram stain aer bottle: Gram positive cocci in clusters resembling Staph      Results called to and read back by: Roseanne Guevara RN 09/08/2024  14:07      Gram stain laicia bottle: Gram positive cocci in clusters resembling Staph      Positive results previously called 09/08/2024      STAPHYLOCOCCUS AUREUS  ID consult required at [facility].  For susceptibility see order #[id number]      Blood culture [[id number]]  (Abnormal) Collected: 09/07/24 0910    Order Status: Completed Specimen: Blood Updated: 09/10/24 0959     Blood Culture, Routine Gram stain aer bottle: Gram positive cocci in clusters resembling Staph      Gram stain alicia bottle: Gram positive cocci in clusters resembling Staph      Results called to and read back by:Luciana Altamirano LPN 09/08/2024      03:59      STAPHYLOCOCCUS AUREUS  ID consult required at [facility].  For susceptibility see order #[id number]      AFB Culture & Smear [[id number]] Collected: 09/06/24 1446    Order Status: Completed " Specimen: Incision site from Ankle, Right Updated: 09/09/24 1545     AFB Culture & Smear Culture in progress     AFB CULTURE STAIN No acid fast bacilli seen.    Narrative:      Right ankle wound - culture    Aerobic culture [[id number]]  (Abnormal)  (Susceptibility) Collected: 09/06/24 1446    Order Status: Completed Specimen: Incision site from Ankle, Right Updated: 09/09/24 1431     Aerobic Bacterial Culture STAPHYLOCOCCUS AUREUS  Many        STREPTOCOCCUS AGALACTIAE (GROUP B)  Many  Beta-hemolytic streptococci are routinely susceptible to   penicillins,cephalosporins and carbapenems.      Narrative:      Right ankle wound - culture    Fungus culture [[id number]] Collected: 09/06/24 1446    Order Status: Completed Specimen: Incision site from Ankle, Right Updated: 09/09/24 1111     Fungus (Mycology) Culture Culture in progress    Narrative:      Right ankle wound - culture    Blood culture x two cultures. Draw prior to antibiotics. [[id number]]  (Abnormal) Collected: 09/06/24 0235    Order Status: Completed Specimen: Blood from Peripheral, Antecubital, Left Updated: 09/08/24 0713     Blood Culture, Routine Gram stain aer bottle: Gram positive cocci in clusters resembling Staph      Gram stain alicia bottle: Gram positive cocci in clusters resembling Staph      Results called to and read back by:Luciana Altamirano RN 09/06/2024      20:55      STAPHYLOCOCCUS AUREUS  For susceptibility see order #[id number]      Narrative:      Aerobic and anaerobic    Blood culture x two cultures. Draw prior to antibiotics. [[id number]]  (Abnormal)  (Susceptibility) Collected: 09/06/24 0235    Order Status: Completed Specimen: Blood from Peripheral, Antecubital, Left Updated: 09/08/24 0711     Blood Culture, Routine Gram stain aer bottle: Gram positive cocci in clusters resembling Staph      Gram stain alicia bottle: Gram positive cocci in clusters resembling Staph      Results called to and read back by:Luciana Altamirano RN 09/06/2024       20:52      STAPHYLOCOCCUS AUREUS    Narrative:      Aerobic and anaerobic    Culture, Anaerobe [[id number]] Collected: 09/06/24 1446    Order Status: Completed Specimen: Incision site from Ankle, Right Updated: 09/07/24 0731     Anaerobic Culture Culture in progress    Narrative:      Right ankle wound - culture    MRSA/SA Rapid ID by PCR from Blood culture [[id number]]  (Abnormal) Collected: 09/06/24 0235    Order Status: Completed Updated: 09/06/24 2224     Staph aureus ID by PCR Positive     Methicillin Resistant ID by PCR Negative    Narrative:      Aerobic and anaerobic    Gram stain [[id number]] Collected: 09/06/24 1446    Order Status: Completed Specimen: Incision site from Ankle, Right Updated: 09/06/24 1805     Gram Stain Result Rare WBC's      Few Gram positive cocci    Narrative:      Right ankle wound - culture            Assessment:  64 y.o.male W/ right ankle post-surgical medial wound dehiscence and exposed hardware.    Plan:  - CTA reviewed, given numerous areas of high grade stenosis, will need Vascular consult for possible angiogram to pre-optimiize blood flow prior to any free flap or pedicled propellar flap.  - continue optimization of BG, endocrinology on board  - ID on board, continue  IV abx. Current wbc remains elevated  - orthopedics on board to assess for source control, possible hardware removal.  - malnutition: will need optimization prior to OR. Prealbumin and transferrin low on last check.     Plan: after medically optimized, plan for flap closure of wound. Will need formal vascular consult for possible angiogram given CTA RLE review.     Pt was d/w attending surgeon, MD Deonna Andres/[facility]

## 2024-09-12 PROBLEM — D72.829 LEUKOCYTOSIS: Status: ACTIVE | Noted: 2024-09-12

## 2024-09-12 PROBLEM — D62 ACUTE BLOOD LOSS ANEMIA: Status: ACTIVE | Noted: 2024-09-12

## 2024-09-12 PROBLEM — R00.0 SINUS TACHYCARDIA: Status: ACTIVE | Noted: 2024-09-12

## 2024-09-12 PROBLEM — I73.9 PERIPHERAL VASCULAR DISEASE: Status: ACTIVE | Noted: 2024-09-12

## 2024-09-12 PROBLEM — G93.41 ACUTE METABOLIC ENCEPHALOPATHY: Status: ACTIVE | Noted: 2024-09-12

## 2024-09-12 PROBLEM — R33.8 ACUTE RETENTION OF URINE: Status: ACTIVE | Noted: 2024-09-12

## 2024-09-12 LAB
ALBUMIN SERPL BCP-MCNC: 1.4 G/DL (ref 3.5–5.2)
ALLENS TEST: ABNORMAL
ALLENS TEST: ABNORMAL
ALP SERPL-CCNC: 484 U/L (ref 55–135)
ALT SERPL W/O P-5'-P-CCNC: 9 U/L (ref 10–44)
AMMONIA PLAS-SCNC: 25 UMOL/L (ref 10–50)
ANION GAP SERPL CALC-SCNC: 11 MMOL/L (ref 8–16)
AST SERPL-CCNC: 96 U/L (ref 10–40)
BACTERIA #/AREA URNS AUTO: ABNORMAL /HPF
BACTERIA SPEC ANAEROBE CULT: NORMAL
BILIRUB SERPL-MCNC: 0.4 MG/DL (ref 0.1–1)
BILIRUB UR QL STRIP: NEGATIVE
BUN SERPL-MCNC: 31 MG/DL (ref 8–23)
CALCIUM SERPL-MCNC: 7.9 MG/DL (ref 8.7–10.5)
CHLORIDE SERPL-SCNC: 103 MMOL/L (ref 95–110)
CK SERPL-CCNC: 127 U/L (ref 20–200)
CLARITY UR REFRACT.AUTO: ABNORMAL
CO2 SERPL-SCNC: 17 MMOL/L (ref 23–29)
COLOR UR AUTO: YELLOW
CREAT SERPL-MCNC: 2.6 MG/DL (ref 0.5–1.4)
CRP SERPL-MCNC: 235.6 MG/L (ref 0–8.2)
DELSYS: ABNORMAL
DELSYS: ABNORMAL
ERYTHROCYTE [DISTWIDTH] IN BLOOD BY AUTOMATED COUNT: 16 % (ref 11.5–14.5)
EST. GFR  (NO RACE VARIABLE): 26.9 ML/MIN/1.73 M^2
FLOW: 2
GLUCOSE SERPL-MCNC: 56 MG/DL (ref 70–110)
GLUCOSE UR QL STRIP: ABNORMAL
GRAN CASTS UR QL COMP ASSIST: 3 /LPF
HCO3 UR-SCNC: 17.8 MMOL/L (ref 24–28)
HCO3 UR-SCNC: 18.2 MMOL/L (ref 24–28)
HCT VFR BLD AUTO: 26 % (ref 40–54)
HCT VFR BLD CALC: 22 %PCV (ref 36–54)
HGB BLD-MCNC: 8.5 G/DL (ref 14–18)
HGB UR QL STRIP: ABNORMAL
HYALINE CASTS UR QL AUTO: 8 /LPF
KETONES UR QL STRIP: ABNORMAL
LACTATE SERPL-SCNC: 0.6 MMOL/L (ref 0.5–2.2)
LEUKOCYTE ESTERASE UR QL STRIP: ABNORMAL
MAGNESIUM SERPL-MCNC: 2.4 MG/DL (ref 1.6–2.6)
MCH RBC QN AUTO: 28 PG (ref 27–31)
MCHC RBC AUTO-ENTMCNC: 32.7 G/DL (ref 32–36)
MCV RBC AUTO: 86 FL (ref 82–98)
MICROSCOPIC COMMENT: ABNORMAL
MODE: ABNORMAL
MODE: ABNORMAL
NITRITE UR QL STRIP: NEGATIVE
OSMOLALITY SERPL: 284 MOSM/KG (ref 280–300)
PCO2 BLDA: 31.8 MMHG (ref 35–45)
PCO2 BLDA: 31.9 MMHG (ref 35–45)
PH SMN: 7.36 [PH] (ref 7.35–7.45)
PH SMN: 7.37 [PH] (ref 7.35–7.45)
PH UR STRIP: 6 [PH] (ref 5–8)
PLATELET # BLD AUTO: 520 K/UL (ref 150–450)
PMV BLD AUTO: 10 FL (ref 9.2–12.9)
PO2 BLDA: 113 MMHG (ref 80–100)
PO2 BLDA: 89 MMHG (ref 80–100)
POC BE: -7 MMOL/L
POC BE: -8 MMOL/L
POC IONIZED CALCIUM: 1.14 MMOL/L (ref 1.06–1.42)
POC SATURATED O2: 97 % (ref 95–100)
POC SATURATED O2: 98 % (ref 95–100)
POC TCO2: 19 MMOL/L (ref 23–27)
POC TCO2: 19 MMOL/L (ref 23–27)
POCT GLUCOSE: 107 MG/DL (ref 70–110)
POCT GLUCOSE: 109 MG/DL (ref 70–110)
POCT GLUCOSE: 138 MG/DL (ref 70–110)
POCT GLUCOSE: 145 MG/DL (ref 70–110)
POCT GLUCOSE: 272 MG/DL (ref 70–110)
POCT GLUCOSE: 48 MG/DL (ref 70–110)
POCT GLUCOSE: 66 MG/DL (ref 70–110)
POCT GLUCOSE: 96 MG/DL (ref 70–110)
POTASSIUM BLD-SCNC: 3.5 MMOL/L (ref 3.5–5.1)
POTASSIUM SERPL-SCNC: 3.7 MMOL/L (ref 3.5–5.1)
PROCALCITONIN SERPL IA-MCNC: 2.02 NG/ML
PROT SERPL-MCNC: 6.5 G/DL (ref 6–8.4)
PROT UR QL STRIP: ABNORMAL
RBC # BLD AUTO: 3.04 M/UL (ref 4.6–6.2)
RBC #/AREA URNS AUTO: 13 /HPF (ref 0–4)
SAMPLE: ABNORMAL
SAMPLE: ABNORMAL
SITE: ABNORMAL
SITE: ABNORMAL
SODIUM BLD-SCNC: 132 MMOL/L (ref 136–145)
SODIUM SERPL-SCNC: 131 MMOL/L (ref 136–145)
SP GR UR STRIP: >1.03 (ref 1–1.03)
SQUAMOUS #/AREA URNS AUTO: 0 /HPF
URN SPEC COLLECT METH UR: ABNORMAL
WBC # BLD AUTO: 22.35 K/UL (ref 3.9–12.7)
WBC #/AREA URNS AUTO: 31 /HPF (ref 0–5)
YEAST UR QL AUTO: ABNORMAL

## 2024-09-12 PROCEDURE — 93922 UPR/L XTREMITY ART 2 LEVELS: CPT | Performed by: SURGERY

## 2024-09-12 PROCEDURE — 85027 COMPLETE CBC AUTOMATED: CPT

## 2024-09-12 PROCEDURE — 82330 ASSAY OF CALCIUM: CPT

## 2024-09-12 PROCEDURE — 87086 URINE CULTURE/COLONY COUNT: CPT | Performed by: HOSPITALIST

## 2024-09-12 PROCEDURE — 84295 ASSAY OF SERUM SODIUM: CPT

## 2024-09-12 PROCEDURE — 25000003 PHARM REV CODE 250: Performed by: HOSPITALIST

## 2024-09-12 PROCEDURE — 27000221 HC OXYGEN, UP TO 24 HOURS

## 2024-09-12 PROCEDURE — 83735 ASSAY OF MAGNESIUM: CPT

## 2024-09-12 PROCEDURE — 82550 ASSAY OF CK (CPK): CPT | Performed by: HOSPITALIST

## 2024-09-12 PROCEDURE — 94761 N-INVAS EAR/PLS OXIMETRY MLT: CPT

## 2024-09-12 PROCEDURE — 63600175 PHARM REV CODE 636 W HCPCS: Performed by: SURGERY

## 2024-09-12 PROCEDURE — 94640 AIRWAY INHALATION TREATMENT: CPT

## 2024-09-12 PROCEDURE — 25000003 PHARM REV CODE 250

## 2024-09-12 PROCEDURE — 36600 WITHDRAWAL OF ARTERIAL BLOOD: CPT

## 2024-09-12 PROCEDURE — 83930 ASSAY OF BLOOD OSMOLALITY: CPT | Performed by: HOSPITALIST

## 2024-09-12 PROCEDURE — 84156 ASSAY OF PROTEIN URINE: CPT | Performed by: HOSPITALIST

## 2024-09-12 PROCEDURE — 63600175 PHARM REV CODE 636 W HCPCS: Performed by: NURSE PRACTITIONER

## 2024-09-12 PROCEDURE — 21400001 HC TELEMETRY ROOM

## 2024-09-12 PROCEDURE — 82803 BLOOD GASES ANY COMBINATION: CPT

## 2024-09-12 PROCEDURE — 25000003 PHARM REV CODE 250: Performed by: NURSE PRACTITIONER

## 2024-09-12 PROCEDURE — 25000242 PHARM REV CODE 250 ALT 637 W/ HCPCS

## 2024-09-12 PROCEDURE — 82570 ASSAY OF URINE CREATININE: CPT | Performed by: HOSPITALIST

## 2024-09-12 PROCEDURE — 82140 ASSAY OF AMMONIA: CPT | Performed by: HOSPITALIST

## 2024-09-12 PROCEDURE — 80053 COMPREHEN METABOLIC PANEL: CPT

## 2024-09-12 PROCEDURE — 25000003 PHARM REV CODE 250: Performed by: SURGERY

## 2024-09-12 PROCEDURE — 63600175 PHARM REV CODE 636 W HCPCS

## 2024-09-12 PROCEDURE — 25000003 PHARM REV CODE 250: Performed by: INTERNAL MEDICINE

## 2024-09-12 PROCEDURE — 84132 ASSAY OF SERUM POTASSIUM: CPT

## 2024-09-12 PROCEDURE — 86140 C-REACTIVE PROTEIN: CPT | Performed by: HOSPITALIST

## 2024-09-12 PROCEDURE — 81001 URINALYSIS AUTO W/SCOPE: CPT | Performed by: HOSPITALIST

## 2024-09-12 PROCEDURE — 83605 ASSAY OF LACTIC ACID: CPT

## 2024-09-12 PROCEDURE — 83605 ASSAY OF LACTIC ACID: CPT | Performed by: HOSPITALIST

## 2024-09-12 PROCEDURE — 99233 SBSQ HOSP IP/OBS HIGH 50: CPT | Mod: ,,, | Performed by: NURSE PRACTITIONER

## 2024-09-12 PROCEDURE — 36415 COLL VENOUS BLD VENIPUNCTURE: CPT | Performed by: HOSPITALIST

## 2024-09-12 PROCEDURE — 63600175 PHARM REV CODE 636 W HCPCS: Performed by: STUDENT IN AN ORGANIZED HEALTH CARE EDUCATION/TRAINING PROGRAM

## 2024-09-12 PROCEDURE — 36415 COLL VENOUS BLD VENIPUNCTURE: CPT

## 2024-09-12 PROCEDURE — 99222 1ST HOSP IP/OBS MODERATE 55: CPT | Mod: ,,, | Performed by: SURGERY

## 2024-09-12 PROCEDURE — 99232 SBSQ HOSP IP/OBS MODERATE 35: CPT | Mod: ,,,

## 2024-09-12 PROCEDURE — 99900035 HC TECH TIME PER 15 MIN (STAT)

## 2024-09-12 PROCEDURE — 25000003 PHARM REV CODE 250: Performed by: PHYSICIAN ASSISTANT

## 2024-09-12 PROCEDURE — 84145 PROCALCITONIN (PCT): CPT | Performed by: NURSE PRACTITIONER

## 2024-09-12 PROCEDURE — 85014 HEMATOCRIT: CPT

## 2024-09-12 RX ORDER — HYDRALAZINE HYDROCHLORIDE 25 MG/1
25 TABLET, FILM COATED ORAL EVERY 8 HOURS PRN
Status: DISCONTINUED | OUTPATIENT
Start: 2024-09-12 | End: 2024-09-28

## 2024-09-12 RX ORDER — INSULIN GLARGINE 100 [IU]/ML
35 INJECTION, SOLUTION SUBCUTANEOUS DAILY
Status: DISCONTINUED | OUTPATIENT
Start: 2024-09-12 | End: 2024-09-12

## 2024-09-12 RX ORDER — INSULIN GLARGINE 100 [IU]/ML
35 INJECTION, SOLUTION SUBCUTANEOUS NIGHTLY
Status: DISCONTINUED | OUTPATIENT
Start: 2024-09-13 | End: 2024-09-12

## 2024-09-12 RX ORDER — MIDAZOLAM HYDROCHLORIDE 1 MG/ML
INJECTION, SOLUTION INTRAMUSCULAR; INTRAVENOUS
Status: DISCONTINUED | OUTPATIENT
Start: 2024-09-12 | End: 2024-09-12 | Stop reason: HOSPADM

## 2024-09-12 RX ORDER — SODIUM BICARBONATE 650 MG/1
650 TABLET ORAL 3 TIMES DAILY
Status: DISCONTINUED | OUTPATIENT
Start: 2024-09-12 | End: 2024-09-15

## 2024-09-12 RX ORDER — ACETAMINOPHEN 500 MG
500 TABLET ORAL EVERY 8 HOURS PRN
Status: DISCONTINUED | OUTPATIENT
Start: 2024-09-12 | End: 2024-09-20

## 2024-09-12 RX ORDER — NALOXONE HYDROCHLORIDE 0.4 MG/ML
INJECTION, SOLUTION INTRAMUSCULAR; INTRAVENOUS; SUBCUTANEOUS
Status: DISCONTINUED | OUTPATIENT
Start: 2024-09-12 | End: 2024-09-12 | Stop reason: HOSPADM

## 2024-09-12 RX ORDER — SODIUM CHLORIDE 9 MG/ML
INJECTION, SOLUTION INTRAVENOUS CONTINUOUS
Status: DISCONTINUED | OUTPATIENT
Start: 2024-09-12 | End: 2024-09-12

## 2024-09-12 RX ORDER — POLYETHYLENE GLYCOL 3350 17 G/17G
17 POWDER, FOR SOLUTION ORAL 2 TIMES DAILY
Status: DISCONTINUED | OUTPATIENT
Start: 2024-09-12 | End: 2024-10-04 | Stop reason: HOSPADM

## 2024-09-12 RX ORDER — SENNOSIDES 8.6 MG/1
8.6 TABLET ORAL 2 TIMES DAILY
Status: DISCONTINUED | OUTPATIENT
Start: 2024-09-12 | End: 2024-10-04 | Stop reason: HOSPADM

## 2024-09-12 RX ORDER — FLUMAZENIL 0.1 MG/ML
INJECTION INTRAVENOUS
Status: DISCONTINUED | OUTPATIENT
Start: 2024-09-12 | End: 2024-09-12 | Stop reason: HOSPADM

## 2024-09-12 RX ORDER — FENTANYL CITRATE 50 UG/ML
INJECTION, SOLUTION INTRAMUSCULAR; INTRAVENOUS
Status: DISCONTINUED | OUTPATIENT
Start: 2024-09-12 | End: 2024-09-12 | Stop reason: HOSPADM

## 2024-09-12 RX ORDER — INSULIN GLARGINE 100 [IU]/ML
35 INJECTION, SOLUTION SUBCUTANEOUS DAILY
Status: DISCONTINUED | OUTPATIENT
Start: 2024-09-13 | End: 2024-09-13

## 2024-09-12 RX ORDER — INSULIN ASPART 100 [IU]/ML
8 INJECTION, SOLUTION INTRAVENOUS; SUBCUTANEOUS
Status: DISCONTINUED | OUTPATIENT
Start: 2024-09-12 | End: 2024-09-13

## 2024-09-12 RX ADMIN — DOXYCYCLINE HYCLATE 100 MG: 100 TABLET, COATED ORAL at 08:09

## 2024-09-12 RX ADMIN — DEXTROSE MONOHYDRATE 250 ML: 100 INJECTION, SOLUTION INTRAVENOUS at 09:09

## 2024-09-12 RX ADMIN — SENNOSIDES 8.6 MG: 8.6 TABLET, FILM COATED ORAL at 08:09

## 2024-09-12 RX ADMIN — IPRATROPIUM BROMIDE AND ALBUTEROL SULFATE 3 ML: 2.5; .5 SOLUTION RESPIRATORY (INHALATION) at 07:09

## 2024-09-12 RX ADMIN — ENOXAPARIN SODIUM 40 MG: 40 INJECTION SUBCUTANEOUS at 04:09

## 2024-09-12 RX ADMIN — ATORVASTATIN CALCIUM 40 MG: 40 TABLET, FILM COATED ORAL at 08:09

## 2024-09-12 RX ADMIN — POLYETHYLENE GLYCOL 3350 17 G: 17 POWDER, FOR SOLUTION ORAL at 09:09

## 2024-09-12 RX ADMIN — GABAPENTIN 1200 MG: 400 CAPSULE ORAL at 09:09

## 2024-09-12 RX ADMIN — NORTRIPTYLINE HYDROCHLORIDE 50 MG: 25 CAPSULE ORAL at 09:09

## 2024-09-12 RX ADMIN — INSULIN ASPART 6 UNITS: 100 INJECTION, SOLUTION INTRAVENOUS; SUBCUTANEOUS at 04:09

## 2024-09-12 RX ADMIN — OXACILLIN 12 G: 2 INJECTION, POWDER, FOR SOLUTION INTRAMUSCULAR; INTRAVENOUS at 09:09

## 2024-09-12 RX ADMIN — DOXYCYCLINE HYCLATE 100 MG: 100 TABLET, COATED ORAL at 09:09

## 2024-09-12 RX ADMIN — SENNOSIDES 8.6 MG: 8.6 TABLET, FILM COATED ORAL at 09:09

## 2024-09-12 RX ADMIN — SENNOSIDES 8.6 MG: 8.6 TABLET, FILM COATED ORAL at 12:09

## 2024-09-12 RX ADMIN — SODIUM CHLORIDE SOLN NEBU 3% 4 ML: 3 NEBU SOLN at 07:09

## 2024-09-12 RX ADMIN — SODIUM BICARBONATE 650 MG TABLET 650 MG: at 09:09

## 2024-09-12 RX ADMIN — Medication 16 G: at 07:09

## 2024-09-12 RX ADMIN — GUAIFENESIN 600 MG: 600 TABLET, EXTENDED RELEASE ORAL at 09:09

## 2024-09-12 RX ADMIN — HYDRALAZINE HYDROCHLORIDE 25 MG: 25 TABLET ORAL at 04:09

## 2024-09-12 RX ADMIN — GUAIFENESIN 600 MG: 600 TABLET, EXTENDED RELEASE ORAL at 08:09

## 2024-09-12 RX ADMIN — SODIUM BICARBONATE 650 MG TABLET 650 MG: at 08:09

## 2024-09-12 RX ADMIN — INSULIN GLARGINE 35 UNITS: 100 INJECTION, SOLUTION SUBCUTANEOUS at 08:09

## 2024-09-12 RX ADMIN — INSULIN ASPART 8 UNITS: 100 INJECTION, SOLUTION INTRAVENOUS; SUBCUTANEOUS at 04:09

## 2024-09-12 RX ADMIN — GABAPENTIN 600 MG: 300 CAPSULE ORAL at 08:09

## 2024-09-12 RX ADMIN — SODIUM CHLORIDE: 9 INJECTION, SOLUTION INTRAVENOUS at 02:09

## 2024-09-12 RX ADMIN — SODIUM BICARBONATE 650 MG TABLET 650 MG: at 02:09

## 2024-09-12 RX ADMIN — IPRATROPIUM BROMIDE AND ALBUTEROL SULFATE 3 ML: 2.5; .5 SOLUTION RESPIRATORY (INHALATION) at 04:09

## 2024-09-12 NOTE — PLAN OF CARE
Problem: Adult Inpatient Plan of Care  Goal: Plan of Care Review  Outcome: Progressing  Goal: Patient-Specific Goal (Individualized)  Outcome: Progressing  Goal: Absence of Hospital-Acquired Illness or Injury  Outcome: Progressing  Goal: Optimal Comfort and Wellbeing  Outcome: Progressing  Goal: Readiness for Transition of Care  Outcome: Progressing     Problem: Fall Injury Risk  Goal: Absence of Fall and Fall-Related Injury  Outcome: Progressing     Problem: Sepsis/Septic Shock  Goal: Optimal Coping  Outcome: Progressing  Goal: Absence of Bleeding  Outcome: Progressing  Goal: Blood Glucose Level Within Targeted Range  Outcome: Progressing  Goal: Absence of Infection Signs and Symptoms  Outcome: Progressing  Goal: Optimal Nutrition Intake  Outcome: Progressing     Problem: Acute Kidney Injury/Impairment  Goal: Fluid and Electrolyte Balance  Outcome: Progressing  Goal: Improved Oral Intake  Outcome: Progressing  Goal: Effective Renal Function  Outcome: Progressing     Problem: Wound  Goal: Optimal Coping  Outcome: Progressing  Goal: Optimal Functional Ability  Outcome: Progressing  Goal: Absence of Infection Signs and Symptoms  Outcome: Progressing  Goal: Improved Oral Intake  Outcome: Progressing  Goal: Optimal Pain Control and Function  Outcome: Progressing  Goal: Skin Health and Integrity  Outcome: Progressing  Goal: Optimal Wound Healing  Outcome: Progressing     Problem: Diabetes Comorbidity  Goal: Blood Glucose Level Within Targeted Range  Outcome: Progressing     Problem: Skin Injury Risk Increased  Goal: Skin Health and Integrity  Outcome: Progressing     Problem: Infection  Goal: Absence of Infection Signs and Symptoms  Outcome: Progressing

## 2024-09-12 NOTE — HPI
64-year-old gentleman with history of hypertension, DM diabetes, nonsmoker who is presenting with a nonhealing right ankle wound status post previous orthopedic surgery, requiring a vascular evaluation for plastics flap coverage of wound.    Currently on an aspirin, statin.

## 2024-09-12 NOTE — ASSESSMENT & PLAN NOTE
Patient with Hypoxic Respiratory failure which is Acute.  he is not on home oxygen. Supplemental oxygen was provided and noted-      .   Signs/symptoms of respiratory failure include- tachypnea and lethargy. Contributing diagnoses includes - Obesity Hypoventilation and Pneumonia Labs and images were reviewed. Patient Has not had a recent ABG. Will treat underlying causes and adjust management of respiratory failure as follows-     - currently on 2L NC, will wean O2 as tolerated  - repeat CXR without acute process  - CTA ordered by ortho/anesthesia prior to I&D. Negative for PE, but small ground glass area to RUL representing infectious vs. Inflammatory process.  - doxy added for pneumonia coverage.  - mucinex BID, acapella, and nebulized saline . Has IS at bedside on exam 9/12  - prn duo nebs   9/8: no signs of overload. Left Ventricle: The left ventricle is normal in size. Normal wall thickness. There is normal systolic function with a visually estimated ejection fraction of 65 - 70%. There is normal diastolic function.    Right Ventricle: Normal right ventricular cavity size. Wall thickness is normal. Systolic function is normal.    IVC/SVC: Normal venous pressure at 3 mmHg.    No evidence of intracardiac mass or vegetation.

## 2024-09-12 NOTE — PROGRESS NOTES
[facility]  Infectious Disease  Progress Note    Patient Name: Cortes Schroeder  MRN: [id number]  Admission Date: 9/6/2024  Length of Stay: 6 days  Attending Physician: Maria Del Rosario Medina MD  Primary Care Provider: Andrew Sinclair Jr., MD    Isolation Status: No active isolations  Assessment/Plan:      Surgical site infection    64 year old with poorly controlled DM (A1C 8.5), recent fall resulting in left wrist fx and right ankle fracture 07/18/24 s/p ex fix Rt ankle and ORIF left wrist 07/19, and later subsequent ORIF right ankle on 7/26/24 with Dr. Liz -- now c/b deep surgical site infection of right ankle involving underlying hardware with associated bacteremia.   No SOI in left wrist.      S/p I&D on 09/06 and 9/9.  Op report noting purulence overlying hardware.  No plans for hardware removal at this time.  Unable to achieve primary closure and Plastic Surgery consulted.   A CTA of  lower extremity showed multifocal high grade stenosis throughout right calf, though no occlusion and Vascular Surgery consulted. Right TBI .41. Attempted angiogram today, but developed extreme confusion/AMS with versed, rapid respiratory rate with versed/fentanyl and procedure postponed.     Plastics recommendations are pending Vascular workup.      Surgical cxs +GBS, MSSA.  Blood cultures 9/6 and 9/7 + for MSSA.  Repeat blood cultures 9/8 NGTD, 9/11.  2D echo negative.   Currently on IV oxacillin for MSSA.   Has been afebrile X 48 hours. Still with leukocytosis.  Also with urinary retention today.  Atkinson placed.  Urine WBC 31. Culture pending.  Creatinine now 2.6.  ? Related to urinary retention?     Recommendations / Plan:  Continue IV oxacillin 12 g q 24 hours continuous infusion.  Continue to monitor liver enzymes.   Anticipate 6 weeks of IV antibiotics from date of most recent washout.    Will consider adding adjunctive rifampin for biofilm penetration if hardware is to remain - will wait to add until other  issues resolved. Will discuss significance of potential interaction with truvada with ID PharmD  Anticipate long term oral antibiotic suppression following IV abx   Follow up CT C/A/P (see below)  Follow up urine culture  Will follow.      Pulmonary        Cough    Persistent non-productive cough.  CTA chest negative for PE. Showed small RUL GGO. Procal 2.6 (but in setting of worsening DEEPALI, may be falsely elevated?)  O2 sats 94-99% on 3 liters nasal cannula.  Currently on oral doxycycline     Recommendations/Plan:   Recommend CT C/A/P without contrast   Repeat procal ordered for am   Continue doxycycline for now.     ID  MSSA bacteremia  See Assessment/plan above        Data reviewed and plan discussed with ID staff  Secure chat/Discussed above plan with Primary Team, Dr. Medina    Thank you.   Please Secure Chat for any questions or concerns.  ISELA Alexis, ANP-C  Infectious Disease     I spent a total of 60 minutes on the day of the visit.This includes face to face time and non-face to face time preparing to see the patient (eg, review of tests), obtaining and/or reviewing separately obtained history, documenting clinical information in the electronic or other health record, independently interpreting results and communicating results to the patient/family/caregiver, or care coordinator.     Subjective:     Principal Problem:Surgical wound breakdown    HPI: Cortes Schroeder is a 64 year old with HTN, poorly controlled DM (A1C 8.5), recent history of syncopal episodes, and  right ankle fracture on 7/18/24 s/p ex fix with subsequent ORIF on 7/26/24 with Dr. Liz.  At outpatient Orthopedic follow up surgical wound was healing and sutures removed.  Since then patient  reportedly doing well until about 1 week ago when he noticed some drainage from his surgical incisions.  Over the past week he developed fevers, chills, and night sweats.  Yesterday he became confused/altered and was brought to ED.  In ED was  hypotensive, tachycardic, WBC 18, .  Sepsis protocol initiated and started on clindamycin has been doing well postoperatively until around 1 week ago when he started noticing some drainage from his surgical incisions. He started to develop fevers, chills and night sweats over the past week. Yesterday afternoon, patient became altered and was brought to the ED by his roommate. Patient currently lives in [place]. Upon presentation to the ED, patient was tachycardic, hypotensive and afebrile. WBC of 18.68, .3. Sepsis protocol initiated. Patient was started on clindamycin and vancomycin.  Now on vancomycin and cefepime.       Orthopedics consulted.  Noted draining wound over the medial side of the right ankle as well as eschar formation over the lateral side of the ankle.  Right ankle and foot noted to be erythematous and edematous.  Pending surgical I&D today.     I have reviewed hospital notes from  HM service and other specialty providers. I have also reviewed CBC, CMP/BMP,  cultures and imaging with my interpretation as documented.      64M with poorly controlled DM (A1C 8.5), recent right ankle fracture 07/18/24 s/p ex fix with subsequent ORIF on 7/26/24 with Dr. Liz -- now c/b deep surgical site infection involving underlying hardware. Pt arrived S/p I&D 09/06. Op report noting purulence overlying hardware, cxs sent, gram stain +GPCs. Index bld cxs 09/06 +Staph aureus (BCID MRSA neg). Repeat bld cxs 09/07 NGTD. Post-op day 1 developed fever, with tachycardia 120, and ongoing AMS. Pt continuing on Iv-vanc and escalated cefepime to zosyn.    Ortho plans to return to OR Monday 09/09 for repeat I&D with Plastic surgery assistance for closure.    Interval History:  Attempted angiogram today but developed acute AMS/confusion with versed/fentanyl.   TBI .41 on right.  Angiogram tentatively rescheduled for tomorrow in OR  Returned to room with persistent confusion.  Blood sugars ok.  Ammonia wnl, CK  wnl.  CT head pending   Has been afebrile, but still with a persistent leukocytosis, though not significantly uptrending - steady 21-22  Worsening DEEPALI - creat now 2.6.   Also with urinary retention and delvalle placed.  UA with 31 WBC, culture pending  Procal 2.6.  Requested CT C/A/P.      Review of Systems   Respiratory:  Positive for cough.    Gastrointestinal:  Positive for nausea.   Musculoskeletal:  Positive for arthralgias and myalgias.   Skin:  Positive for color change and wound.   All other systems reviewed and are negative.    Objective:     Vital Signs (Most Recent):  Temp: 98.4 °F (36.9 °C) (09/12/24 1522)  Pulse: (!) 112 (09/12/24 1621)  Resp: (!) 25 (09/12/24 1621)  BP: 127/76 (09/12/24 1621)  SpO2: 97 % (09/12/24 1621) Vital Signs (24h Range):  Temp:  [98.1 °F (36.7 °C)-100.1 °F (37.8 °C)] 98.4 °F (36.9 °C)  Pulse:  [108-118] 112  Resp:  [18-45] 25  SpO2:  [94 %-100 %] 97 %  BP: (127-180)/(61-85) 127/76     Weight: 93 kg (205 lb 0.4 oz)  Body mass index is 34.12 kg/m².    Estimated Creatinine Clearance: 30.5 mL/min (A) (based on SCr of 2.6 mg/dL (H)).     Physical Exam  Vitals and nursing note reviewed.   Constitutional:       General: He is in acute distress.      Appearance: Normal appearance. He is ill-appearing. He is not toxic-appearing or diaphoretic.      Comments: Confusion   HENT:      Nose: No congestion.      Mouth/Throat:      Pharynx: No oropharyngeal exudate.   Eyes:      General: No scleral icterus.     Conjunctiva/sclera: Conjunctivae normal.   Cardiovascular:      Rate and Rhythm: Regular rhythm. Tachycardia present.      Heart sounds: No murmur heard.  Pulmonary:      Effort: Pulmonary effort is normal. Tachypnea present. No respiratory distress.      Breath sounds: No wheezing.      Comments: Bibasilar crackles    Musculoskeletal:      Cervical back: Neck supple. No tenderness.      Comments: Right ankle with surgical dressings in place + wound vac   Skin:     General: Skin is warm and  dry.   Neurological:      Mental Status: He is alert. He is disoriented.   Psychiatric:         Mood and Affect: Mood normal.         Behavior: Behavior normal.          Significant Labs: Blood Culture:   Recent Labs   Lab 09/06/24  0235 09/07/24  0910 09/08/24  1641 09/10/24  1711   LABBLOO Gram stain aer bottle: Gram positive cocci in clusters resembling Staph  Gram stain alicia bottle: Gram positive cocci in clusters resembling Staph  Results called to and read back by:Luciana Altamirano RN 09/06/2024  20:52  STAPHYLOCOCCUS AUREUS*  Gram stain aer bottle: Gram positive cocci in clusters resembling Staph  Gram stain alicia bottle: Gram positive cocci in clusters resembling Staph  Results called to and read back by:Luciana Altamirano RN 09/06/2024  20:55  STAPHYLOCOCCUS AUREUS  For susceptibility see order #[id number]  * Gram stain aer bottle: Gram positive cocci in clusters resembling Staph  Results called to and read back by: Roseanne Guevara RN 09/08/2024  14:07  Gram stain alicia bottle: Gram positive cocci in clusters resembling Staph  Positive results previously called 09/08/2024  STAPHYLOCOCCUS AUREUS  ID consult required at [facility].  For susceptibility see order #[id number]  *  Gram stain aer bottle: Gram positive cocci in clusters resembling Staph  Gram stain alicia bottle: Gram positive cocci in clusters resembling Staph  Results called to and read back by:Luciana Altamirano LPN 09/08/2024  03:59  STAPHYLOCOCCUS AUREUS  ID consult required at [facility].  For susceptibility see order #[id number]  * No Growth to date  No Growth to date  No Growth to date  No Growth to date  No Growth to date  No Growth to date  No Growth to date  No Growth to date No Growth to date  No Growth to date     CBC:   Recent Labs   Lab 09/11/24  1041 09/12/24  0537   WBC 21.86* 22.35*   HGB 9.0* 8.5*   HCT 27.1* 26.0*   * 520*     CMP:   Recent Labs    Lab 09/11/24  1041 09/12/24  0538   * 131*   K 3.4* 3.7    103   CO2 18* 17*   * 56*   BUN 30* 31*   CREATININE 2.1* 2.6*   CALCIUM 7.7* 7.9*   PROT 5.9* 6.5   ALBUMIN 1.3* 1.4*   BILITOT 0.4 0.4   ALKPHOS 549* 484*   * 96*   ALT 19 9*   ANIONGAP 10 11     Microbiology Results (last 7 days)       Procedure Component Value Units Date/Time    Urine culture [[id number]] Collected: 09/12/24 1543    Order Status: No result Specimen: Urine Updated: 09/12/24 1655    Culture, Anaerobe [[id number]] Collected: 09/06/24 1446    Order Status: Completed Specimen: Incision site from Ankle, Right Updated: 09/12/24 1315     Anaerobic Culture No anaerobes isolated    Narrative:      Right ankle wound - culture    Blood culture [[id number]] Collected: 09/10/24 1711    Order Status: Completed Specimen: Blood Updated: 09/11/24 2012     Blood Culture, Routine No Growth to date      No Growth to date    Blood culture [[id number]] Collected: 09/08/24 1641    Order Status: Completed Specimen: Blood Updated: 09/11/24 2012     Blood Culture, Routine No Growth to date      No Growth to date      No Growth to date      No Growth to date    Narrative:      [id number]    Blood culture [[id number]] Collected: 09/08/24 1641    Order Status: Completed Specimen: Blood Updated: 09/11/24 2012     Blood Culture, Routine No Growth to date      No Growth to date      No Growth to date      No Growth to date    Narrative:      08271    Blood culture x two cultures. Draw prior to antibiotics. [[id number]]  (Abnormal)  (Susceptibility) Collected: 09/06/24 0235    Order Status: Completed Specimen: Blood from Peripheral, Antecubital, Left Updated: 09/11/24 1203     Blood Culture, Routine Gram stain aer bottle: Gram positive cocci in clusters resembling Staph      Gram stain alicia bottle: Gram positive cocci in clusters resembling Staph      Results called to and read back by:Luciana Altamirano RN 09/06/2024      20:52      STAPHYLOCOCCUS  AUREUS    Narrative:      Aerobic and anaerobic    Blood culture [[id number]]  (Abnormal) Collected: 09/07/24 0910    Order Status: Completed Specimen: Blood Updated: 09/10/24 1000     Blood Culture, Routine Gram stain aer bottle: Gram positive cocci in clusters resembling Staph      Results called to and read back by: Roseanne Guevara RN 09/08/2024  14:07      Gram stain alicia bottle: Gram positive cocci in clusters resembling Staph      Positive results previously called 09/08/2024      STAPHYLOCOCCUS AUREUS  ID consult required at Health system.  For susceptibility see order #[id number]      Blood culture [[id number]]  (Abnormal) Collected: 09/07/24 0910    Order Status: Completed Specimen: Blood Updated: 09/10/24 0959     Blood Culture, Routine Gram stain aer bottle: Gram positive cocci in clusters resembling Staph      Gram stain alicia bottle: Gram positive cocci in clusters resembling Staph      Results called to and read back by:Luciana Altamirano LPN 09/08/2024      03:59      STAPHYLOCOCCUS AUREUS  ID consult required at [facility] and [facility].  For susceptibility see order #[id number]      AFB Culture & Smear [[id number]] Collected: 09/06/24 1446    Order Status: Completed Specimen: Incision site from Ankle, Right Updated: 09/09/24 1545     AFB Culture & Smear Culture in progress     AFB CULTURE STAIN No acid fast bacilli seen.    Narrative:      Right ankle wound - culture    Aerobic culture [[id number]]  (Abnormal)  (Susceptibility) Collected: 09/06/24 1446    Order Status: Completed Specimen: Incision site from Ankle, Right Updated: 09/09/24 1431     Aerobic Bacterial Culture STAPHYLOCOCCUS AUREUS  Many        STREPTOCOCCUS AGALACTIAE (GROUP B)  Many  Beta-hemolytic streptococci are routinely susceptible to   penicillins,cephalosporins and carbapenems.      Narrative:      Right ankle wound - culture    Fungus culture [[id number]] Collected: 09/06/24 1446    Order  Status: Completed Specimen: Incision site from Ankle, Right Updated: 09/09/24 1111     Fungus (Mycology) Culture Culture in progress    Narrative:      Right ankle wound - culture    Blood culture x two cultures. Draw prior to antibiotics. [[id number]]  (Abnormal) Collected: 09/06/24 0235    Order Status: Completed Specimen: Blood from Peripheral, Antecubital, Left Updated: 09/08/24 0713     Blood Culture, Routine Gram stain aer bottle: Gram positive cocci in clusters resembling Staph      Gram stain alicia bottle: Gram positive cocci in clusters resembling Staph      Results called to and read back by:Luciana Altamirano RN 09/06/2024      20:55      STAPHYLOCOCCUS AUREUS  For susceptibility see order #[id number]      Narrative:      Aerobic and anaerobic    MRSA/SA Rapid ID by PCR from Blood culture [[id number]]  (Abnormal) Collected: 09/06/24 0235    Order Status: Completed Updated: 09/06/24 2224     Staph aureus ID by PCR Positive     Methicillin Resistant ID by PCR Negative    Narrative:      Aerobic and anaerobic    Gram stain [[id number]] Collected: 09/06/24 1446    Order Status: Completed Specimen: Incision site from Ankle, Right Updated: 09/06/24 1805     Gram Stain Result Rare WBC's      Few Gram positive cocci    Narrative:      Right ankle wound - culture          Wound Culture:   Recent Labs   Lab 09/06/24  1446   LABAERO STAPHYLOCOCCUS AUREUS  Many  *  STREPTOCOCCUS AGALACTIAE (GROUP B)  Many  Beta-hemolytic streptococci are routinely susceptible to   penicillins,cephalosporins and carbapenems.  *       Significant Imaging: I have reviewed all pertinent imaging results/findings within the past 24 hours.

## 2024-09-12 NOTE — ASSESSMENT & PLAN NOTE
- liver enzymes up and down, highest ast at 155, between   Now at 96  - will hold truvada for now  - monitor with daily CMP

## 2024-09-12 NOTE — PLAN OF CARE
Head CT added given not baseline this AM and updated by vascular about inability to follow directions for procedure today and did not have any head imaging on admit for completeness.     Discussed with ID who also recs CT C/A/P for further work up of persistant leukocytosis with known bacteremia on admit for further sources.

## 2024-09-12 NOTE — ASSESSMENT & PLAN NOTE
DEEPALI is likely due to pre-renal azotemia due to dehydration. Baseline creatinine is  1 . Most recent creatinine and eGFR are listed below.  Recent Labs     09/10/24  0439 09/11/24  1041 09/12/24  0538   CREATININE 1.6* 2.1* 2.6*   EGFRNORACEVR 48.1* 34.7* 26.9*        Plan  - DEEPALI is worsening. Will adjust treatment as follows: give additional fluids   - Avoid nephrotoxins and renally dose meds for GFR listed above  - Monitor urine output, serial BMP, and adjust therapy as needed  - baseline 1.6--2.1--2.6 now. EF on prev echo 70% so signs of volume depletion, as well as hyponatremia and tachycardia. Resume IVF 9/12 and urine and Na studies.

## 2024-09-12 NOTE — AI DETERIORATION ALERT
"RAPID RESPONSE NURSE AI ALERT       AI alert received.    Chart Reviewed: 09/12/2024, 3:52 PM    MRN: [id number]  Bed: 542/542 A    Dx: Surgical wound breakdown    Cortes Schroeder has a past medical history of Anxiety, Back pain, Cataract, Chronic pain syndrome, Diabetes type 2, controlled, Diabetic retinopathy, DM (diabetes mellitus), DM (diabetes mellitus), type 2, uncontrolled, Gastroesophageal reflux disease without esophagitis, Hyperlipidemia, Insomnia, and Neuropathy.    Last VS: BP (!) 179/85   Pulse (!) 112   Temp 98.4 °F (36.9 °C)   Resp 18   Ht 5' 5" (1.651 m)   Wt 93 kg (205 lb 0.4 oz)   SpO2 95%   BMI 34.12 kg/m²     24H Vital Sign Range:  Temp:  [98.1 °F (36.7 °C)-100.1 °F (37.8 °C)]   Pulse:  [108-118]   Resp:  [18-45]   BP: (127-180)/(61-85)   SpO2:  [94 %-100 %]     Level of Consciousness (AVPU): responds to voice    Recent Labs     09/10/24  0439 09/11/24  1041 09/12/24  0537   WBC 20.10* 21.86* 22.35*   HGB 8.4* 9.0* 8.5*   HCT 25.9* 27.1* 26.0*    482* 520*       Recent Labs     09/10/24  0439 09/11/24  1041 09/12/24  0538   * 132* 131*   K 3.9 3.4* 3.7    104 103   CO2 21* 18* 17*   BUN 22 30* 31*   CREATININE 1.6* 2.1* 2.6*   * 113* 56*   PHOS 4.1  --   --    MG 2.2  --  2.4            OXYGEN:  Flow (L/min) (Oxygen Therapy): 3          MEWS score: 2    Bedside RNEthan contacted for AI alert. Patient in cathlab earlier this morning and received versed IV. Since the medication patient has been altered. Dr. Medina ordered Head Ct and ABG.  No additional concerns verbalized at this time. Instructed to call [phone] for further concerns or assistance.    Wade Lacey RN        "

## 2024-09-12 NOTE — PROGRESS NOTES
[facility]  Orthopedics  Progress Note    Attg Note:  I agree with the resident's assessment and plan.    Moe Liz MD      Patient Name: Cortes Schroeder  MRN: [id number]  Admission Date: 9/6/2024  Hospital Length of Stay: 6 days  Attending Provider: Maria Del Rosario Medina MD  Primary Care Provider: Andrew Sinclair Jr., MD  Follow-up For: Procedure(s) (LRB):  IRRIGATION AND DEBRIDEMENT, LOWER EXTREMITY - WITH WOUND VAC CHANGE, RIGHT ANKLE (Right)  ARTHROTOMY, ANKLE (Right)    Post-Operative Day: 3 Days Post-Op  Subjective:     Principal Problem:Surgical wound breakdown    Principal Orthopedic Problem: s/p I&D and wound vac placement on 09/06    Interval History:   Pain controlled this AM. Splint CDI, Prevena in place to medial leg. Seen by vascular surgery who ordered TBIs and plans for possible angio later today. NPO for this possible procedure.       Review of patient's allergies indicates:   Allergen Reactions    Invokana [canagliflozin] Anaphylaxis    Percocet [oxycodone-acetaminophen] Nausea Only and Hallucinations    Biaxin [clarithromycin]     Hydrocodone Other (See Comments)     Dizzy/nausea/hallucinations    Sulfa (sulfonamide antibiotics) Nausea Only and Rash       Current Facility-Administered Medications   Medication    0.9%  NaCl infusion    acetaminophen tablet 500 mg    albuterol-ipratropium 2.5 mg-0.5 mg/3 mL nebulizer solution 3 mL    atorvastatin tablet 40 mg    calcium carbonate 200 mg calcium (500 mg) chewable tablet 1,000 mg    dextrose 10% bolus 125 mL 125 mL    dextrose 10% bolus 125 mL 125 mL    dextrose 10% bolus 250 mL 250 mL    dextrose 10% bolus 250 mL 250 mL    doxycycline tablet 100 mg    enoxaparin injection 40 mg    gabapentin capsule 600 mg    And    gabapentin capsule 1,200 mg    glucagon (human recombinant) injection 1 mg    glucose chewable tablet 16 g    glucose chewable tablet 24 g    guaiFENesin 12 hr tablet 600 mg    insulin aspart U-100 pen 0-10 Units    insulin  "aspart U-100 pen 9 Units    [START ON 9/13/2024] insulin glargine U-100 (Lantus) pen 35 Units    morphine injection 2 mg    morphine injection 2 mg    morphine injection 4 mg    nortriptyline capsule 50 mg    ondansetron disintegrating tablet 8 mg    oxacillin 12 g in  mL CONTINUOUS INFUSION    polyethylene glycol packet 17 g    senna tablet 8.6 mg    sodium bicarbonate tablet 650 mg     Objective:     Vital Signs (Most Recent):  Temp: 100.1 °F (37.8 °C) (09/12/24 0734)  Pulse: 110 (09/12/24 0734)  Resp: 20 (09/12/24 0734)  BP: 132/84 (09/12/24 0734)  SpO2: 99 % (09/12/24 0734) Vital Signs (24h Range):  Temp:  [98.1 °F (36.7 °C)-100.1 °F (37.8 °C)] 100.1 °F (37.8 °C)  Pulse:  [100-118] 110  Resp:  [16-22] 20  SpO2:  [94 %-99 %] 99 %  BP: (127-174)/(61-84) 132/84     Weight: 93 kg (205 lb 0.4 oz)  Height: 5' 5" (165.1 cm)  Body mass index is 34.12 kg/m².      Intake/Output Summary (Last 24 hours) at 9/12/2024 0957  Last data filed at 9/12/2024 0734  Gross per 24 hour   Intake 342 ml   Output 1550 ml   Net -1208 ml        Ortho/SPM Exam     AAOx4  NAD  Tachycardic  No increased WOB    RLE    Dressing C/D/I   Wound vac to good suction  Compartments soft/compressible  Reduced sensation to the dorsum of toe  Active toe dorsiflexion & plantarflexion  WWP. Brisk cap refill      Significant Labs:   Recent Labs   Lab 09/12/24  0537   WBC 22.35*   RBC 3.04*   HGB 8.5*   HCT 26.0*   *   MCV 86   MCH 28.0   MCHC 32.7         Significant Imaging: I have reviewed and interpreted all pertinent imaging results/findings.  CTA RLE: Multifocal high-grade stenoses throughout the right calf arteries. No arterial occlusion.    CTA chest: No large central PE identified  Assessment/Plan:     * Surgical wound breakdown  Cortse Schroeder is a 64 y.o. male with PMH of DM, HTN  and right trimalleolar ankle fracture status post ex fix on 07/18 with subsequent definitive fixation on 07/26 admitted with right ankle wound dehiscence " in the setting of uncontrolled diabetes.      He is s/p I&D of R ankle 09/06. Repeat I&D R medial ankle 09/09.      VS: tacky 110s ON.   Labs: Worsening DEEPALI   Diet: NPO  Pain control: multimodal regimen  PT/OT:  NWB RLE   DVT PPx: Lovenox 40  Abx:  oxacillin continuous; ID following   Cultures:  ankle cx staph +      Dispo: appreciate plastics and vascular recs, possible angiogram today                 De Golden MD  Orthopedics  [facility] - Surgery

## 2024-09-12 NOTE — SUBJECTIVE & OBJECTIVE
Principal Problem:Surgical wound breakdown    Principal Orthopedic Problem: s/p I&D and wound vac placement on 09/06    Interval History:   Pain controlled this AM. Splint CDI, Prevena in place to medial leg. Seen by vascular surgery who ordered TBIs and plans for possible angio later today. NPO for this possible procedure.       Review of patient's allergies indicates:   Allergen Reactions    Invokana [canagliflozin] Anaphylaxis    Percocet [oxycodone-acetaminophen] Nausea Only and Hallucinations    Biaxin [clarithromycin]     Hydrocodone Other (See Comments)     Dizzy/nausea/hallucinations    Sulfa (sulfonamide antibiotics) Nausea Only and Rash       Current Facility-Administered Medications   Medication    0.9%  NaCl infusion    acetaminophen tablet 500 mg    albuterol-ipratropium 2.5 mg-0.5 mg/3 mL nebulizer solution 3 mL    atorvastatin tablet 40 mg    calcium carbonate 200 mg calcium (500 mg) chewable tablet 1,000 mg    dextrose 10% bolus 125 mL 125 mL    dextrose 10% bolus 125 mL 125 mL    dextrose 10% bolus 250 mL 250 mL    dextrose 10% bolus 250 mL 250 mL    doxycycline tablet 100 mg    enoxaparin injection 40 mg    gabapentin capsule 600 mg    And    gabapentin capsule 1,200 mg    glucagon (human recombinant) injection 1 mg    glucose chewable tablet 16 g    glucose chewable tablet 24 g    guaiFENesin 12 hr tablet 600 mg    insulin aspart U-100 pen 0-10 Units    insulin aspart U-100 pen 9 Units    [START ON 9/13/2024] insulin glargine U-100 (Lantus) pen 35 Units    morphine injection 2 mg    morphine injection 2 mg    morphine injection 4 mg    nortriptyline capsule 50 mg    ondansetron disintegrating tablet 8 mg    oxacillin 12 g in  mL CONTINUOUS INFUSION    polyethylene glycol packet 17 g    senna tablet 8.6 mg    sodium bicarbonate tablet 650 mg     Objective:     Vital Signs (Most Recent):  Temp: 100.1 °F (37.8 °C) (09/12/24 0734)  Pulse: 110 (09/12/24 0734)  Resp: 20 (09/12/24 0734)  BP: 132/84  "(09/12/24 0734)  SpO2: 99 % (09/12/24 0734) Vital Signs (24h Range):  Temp:  [98.1 °F (36.7 °C)-100.1 °F (37.8 °C)] 100.1 °F (37.8 °C)  Pulse:  [100-118] 110  Resp:  [16-22] 20  SpO2:  [94 %-99 %] 99 %  BP: (127-174)/(61-84) 132/84     Weight: 93 kg (205 lb 0.4 oz)  Height: 5' 5" (165.1 cm)  Body mass index is 34.12 kg/m².      Intake/Output Summary (Last 24 hours) at 9/12/2024 0957  Last data filed at 9/12/2024 0734  Gross per 24 hour   Intake 342 ml   Output 1550 ml   Net -1208 ml        Ortho/SPM Exam     AAOx4  NAD  Tachycardic  No increased WOB    RLE    Dressing C/D/I   Wound vac to good suction  Compartments soft/compressible  Reduced sensation to the dorsum of toe  Active toe dorsiflexion & plantarflexion  WWP. Brisk cap refill      Significant Labs:   Recent Labs   Lab 09/12/24  0537   WBC 22.35*   RBC 3.04*   HGB 8.5*   HCT 26.0*   *   MCV 86   MCH 28.0   MCHC 32.7         Significant Imaging: I have reviewed and interpreted all pertinent imaging results/findings.  CTA RLE: Multifocal high-grade stenoses throughout the right calf arteries. No arterial occlusion.    CTA chest: No large central PE identified  "

## 2024-09-12 NOTE — ASSESSMENT & PLAN NOTE
Patient's FSGs are not controlled on current hypoglycemics.   Last A1c reviewed-   Lab Results   Component Value Date    LABA1C 10.8 (H) 08/15/2016    HGBA1C 8.5 (H) 09/06/2024     Most recent fingerstick glucose reviewed-   Recent Labs   Lab 09/11/24  2119 09/11/24  2316 09/12/24  0727 09/12/24  0843   POCTGLUCOSE 68* 89 66* 96       Current correctional scale  Low  Maintain anti-hyperglycemic dose as follows-   Antihyperglycemics (From admission, onward)      Start     Stop Route Frequency Ordered    09/13/24 0900  insulin glargine U-100 (Lantus) pen 35 Units         -- SubQ Daily 09/12/24 0845    09/12/24 0715  insulin aspart U-100 pen 9 Units         -- SubQ 3 times daily with meals 09/11/24 1649    09/11/24 0959  insulin aspart U-100 pen 0-10 Units         -- SubQ Before meals & nightly PRN 09/11/24 0859           - Endocrine consulted:  - At this time, recommend that the patient remain off of his pump since he cannot be controlled in the Auto mode. Patient does not interact with pump frequently and relies on the auto mode algorithm.   - Lantus (Insulin Glargine) 35   - Novolog (Insulin Aspart) 9 units TIDWM (20% decrease given post-prandial BG below goal) (Hold if NPO) and prn for BG excursions MDC SSI (150/25)   - hold oral antihyperglycemics during hospitalization   - needs better BG control for optimal wound healing   Doses decreased on 9/12 for hypoglycemia this AM with endo managing

## 2024-09-12 NOTE — ASSESSMENT & PLAN NOTE
Anemia is likely due to acute blood loss which was from surgery . Most recent hemoglobin and hematocrit are listed below.  Recent Labs     09/10/24  0439 09/11/24  1041 09/12/24  0537   HGB 8.4* 9.0* 8.5*   HCT 25.9* 27.1* 26.0*     Plan  - Monitor serial CBC: Daily  - Transfuse PRBC if patient becomes hemodynamically unstable, symptomatic or H/H drops below 7/21.  - Patient has not received any PRBC transfusions to date  - Patient's anemia is currently stable  -

## 2024-09-12 NOTE — ASSESSMENT & PLAN NOTE
Titrate insulin slowly to avoid hypoglycemia as the risk of hypoglycemia increases with decreased creatinine clearance.  Estimated Creatinine Clearance: 30.5 mL/min (A) (based on SCr of 2.6 mg/dL (H)).

## 2024-09-12 NOTE — ASSESSMENT & PLAN NOTE
"This patient does have evidence of infective focus  My overall impression is sepsis.  Source: Skin and Soft Tissue (location ankle)  Antibiotics given-   Antibiotics (72h ago, onward)      Start     Stop Route Frequency Ordered    09/11/24 2100  doxycycline tablet 100 mg         -- Oral Every 12 hours 09/11/24 1223    09/09/24 1730  oxacillin 12 g in  mL CONTINUOUS INFUSION         -- IV Every 24 hours (non-standard times) 09/09/24 1630          Latest lactate reviewed-  No results for input(s): "LACTATE", "POCLAC" in the last 72 hours.    Organ dysfunction indicated by Acute kidney injury and Encephalopathy    Fluid challenge Actual Body weight- Patient will receive 30ml/kg actual body weight to calculate fluid bolus for treatment of septic shock.     Post- resuscitation assessment No - Post resuscitation assessment not needed     Will Not start Pressors-   Source control achieved by: see above  -secondary to surgical wound infeection in right ankle, with 9/6 wound Cx with group B strep and staph with bacteremia with 9/7 blood with staph with 9/8 and 9/10 blood Cx NGTD  ID following. Echo without signs of vegtations. On oxacillin now. Wbc still higher at 22, and ID following, procal 2 on 9/12, and repeat CRP pending. Will f/u ID recs further.  "

## 2024-09-12 NOTE — ASSESSMENT & PLAN NOTE
Endocrinology consulted for BG management.   BG goal 140-180    - Lantus (Insulin Glargine 35 units nightly  (30% decrease given fasting hypoglycemia)   - Novolog (Insulin Aspart) 9 units TIDWM (Hold if NPO) and prn for BG excursions MDC SSI (150/25) (20% decrease given post-prandial BG below goal)   - BG checks AC/HS  - Hypoglycemia protocol in place    ** Please notify Endocrine for any change and/or advance in diet**  ** Please call Endocrine for any BG related issues **    Discharge Planning:   TBD. Please notify endocrinology prior to discharge.

## 2024-09-12 NOTE — SUBJECTIVE & OBJECTIVE
Interval History:  Attempted angiogram today but developed acute AMS/confusion with versed/fentanyl.   TBI .41 on right.  Angiogram tentatively rescheduled for tomorrow in OR  Returned to room with persistent confusion.  Blood sugars ok.  Ammonia wnl, CK wnl.  CT head pending   Has been afebrile, but still with a persistent leukocytosis, though not significantly uptrending - steady 21-22  Worsening DEEPALI - creat now 2.6.   Also with urinary retention and delvalle placed.  UA with 31 WBC, culture pending  Procal 2.6.  Requested CT C/A/P.      Review of Systems   Respiratory:  Positive for cough.    Gastrointestinal:  Positive for nausea.   Musculoskeletal:  Positive for arthralgias and myalgias.   Skin:  Positive for color change and wound.   All other systems reviewed and are negative.    Objective:     Vital Signs (Most Recent):  Temp: 98.4 °F (36.9 °C) (09/12/24 1522)  Pulse: (!) 112 (09/12/24 1621)  Resp: (!) 25 (09/12/24 1621)  BP: 127/76 (09/12/24 1621)  SpO2: 97 % (09/12/24 1621) Vital Signs (24h Range):  Temp:  [98.1 °F (36.7 °C)-100.1 °F (37.8 °C)] 98.4 °F (36.9 °C)  Pulse:  [108-118] 112  Resp:  [18-45] 25  SpO2:  [94 %-100 %] 97 %  BP: (127-180)/(61-85) 127/76     Weight: 93 kg (205 lb 0.4 oz)  Body mass index is 34.12 kg/m².    Estimated Creatinine Clearance: 30.5 mL/min (A) (based on SCr of 2.6 mg/dL (H)).     Physical Exam  Vitals and nursing note reviewed.   Constitutional:       General: He is in acute distress.      Appearance: Normal appearance. He is ill-appearing. He is not toxic-appearing or diaphoretic.      Comments: Confusion   HENT:      Nose: No congestion.      Mouth/Throat:      Pharynx: No oropharyngeal exudate.   Eyes:      General: No scleral icterus.     Conjunctiva/sclera: Conjunctivae normal.   Cardiovascular:      Rate and Rhythm: Regular rhythm. Tachycardia present.      Heart sounds: No murmur heard.  Pulmonary:      Effort: Pulmonary effort is normal. Tachypnea present. No respiratory  distress.      Breath sounds: No wheezing.      Comments: Bibasilar crackles    Musculoskeletal:      Cervical back: Neck supple. No tenderness.      Comments: Right ankle with surgical dressings in place + wound vac   Skin:     General: Skin is warm and dry.   Neurological:      Mental Status: He is alert. He is disoriented.   Psychiatric:         Mood and Affect: Mood normal.         Behavior: Behavior normal.          Significant Labs: Blood Culture:   Recent Labs   Lab 09/06/24  0235 09/07/24  0910 09/08/24  1641 09/10/24  1711   LABBLOO Gram stain aer bottle: Gram positive cocci in clusters resembling Staph  Gram stain alicia bottle: Gram positive cocci in clusters resembling Staph  Results called to and read back by:Luciana Altamirano RN 09/06/2024  20:52  STAPHYLOCOCCUS AUREUS*  Gram stain aer bottle: Gram positive cocci in clusters resembling Staph  Gram stain alicia bottle: Gram positive cocci in clusters resembling Staph  Results called to and read back by:Luciana Altamirano RN 09/06/2024  20:55  STAPHYLOCOCCUS AUREUS  For susceptibility see order #[id number]  * Gram stain aer bottle: Gram positive cocci in clusters resembling Staph  Results called to and read back by: Roseanne Guevara RN 09/08/2024  14:07  Gram stain alicia bottle: Gram positive cocci in clusters resembling Staph  Positive results previously called 09/08/2024  STAPHYLOCOCCUS AUREUS  ID consult required at [facility].  For susceptibility see order #[id number]  *  Gram stain aer bottle: Gram positive cocci in clusters resembling Staph  Gram stain alicia bottle: Gram positive cocci in clusters resembling Staph  Results called to and read back by:Luciana Altamirano LPN 09/08/2024  03:59  STAPHYLOCOCCUS AUREUS  ID consult required at [facility].  For susceptibility see order #[id number]  * No Growth to date  No Growth to date  No Growth to date  No Growth to date  No Growth to  date  No Growth to date  No Growth to date  No Growth to date No Growth to date  No Growth to date     CBC:   Recent Labs   Lab 09/11/24  1041 09/12/24  0537   WBC 21.86* 22.35*   HGB 9.0* 8.5*   HCT 27.1* 26.0*   * 520*     CMP:   Recent Labs   Lab 09/11/24  1041 09/12/24  0538   * 131*   K 3.4* 3.7    103   CO2 18* 17*   * 56*   BUN 30* 31*   CREATININE 2.1* 2.6*   CALCIUM 7.7* 7.9*   PROT 5.9* 6.5   ALBUMIN 1.3* 1.4*   BILITOT 0.4 0.4   ALKPHOS 549* 484*   * 96*   ALT 19 9*   ANIONGAP 10 11     Microbiology Results (last 7 days)       Procedure Component Value Units Date/Time    Urine culture [[id number]] Collected: 09/12/24 1543    Order Status: No result Specimen: Urine Updated: 09/12/24 1655    Culture, Anaerobe [[id number]] Collected: 09/06/24 1446    Order Status: Completed Specimen: Incision site from Ankle, Right Updated: 09/12/24 1315     Anaerobic Culture No anaerobes isolated    Narrative:      Right ankle wound - culture    Blood culture [[id number]] Collected: 09/10/24 1711    Order Status: Completed Specimen: Blood Updated: 09/11/24 2012     Blood Culture, Routine No Growth to date      No Growth to date    Blood culture [[id number]] Collected: 09/08/24 1641    Order Status: Completed Specimen: Blood Updated: 09/11/24 2012     Blood Culture, Routine No Growth to date      No Growth to date      No Growth to date      No Growth to date    Narrative:      [id number]    Blood culture [[id number]] Collected: 09/08/24 1641    Order Status: Completed Specimen: Blood Updated: 09/11/24 2012     Blood Culture, Routine No Growth to date      No Growth to date      No Growth to date      No Growth to date    Narrative:      08271    Blood culture x two cultures. Draw prior to antibiotics. [[id number]]  (Abnormal)  (Susceptibility) Collected: 09/06/24 0235    Order Status: Completed Specimen: Blood from Peripheral, Antecubital, Left Updated: 09/11/24 1203     Blood Culture,  Routine Gram stain aer bottle: Gram positive cocci in clusters resembling Staph      Gram stain alicia bottle: Gram positive cocci in clusters resembling Staph      Results called to and read back by:Luciana Altamirano RN 09/06/2024      20:52      STAPHYLOCOCCUS AUREUS    Narrative:      Aerobic and anaerobic    Blood culture [[id number]]  (Abnormal) Collected: 09/07/24 0910    Order Status: Completed Specimen: Blood Updated: 09/10/24 1000     Blood Culture, Routine Gram stain aer bottle: Gram positive cocci in clusters resembling Staph      Results called to and read back by: Roseanne Guevara RN 09/08/2024  14:07      Gram stain alicia bottle: Gram positive cocci in clusters resembling Staph      Positive results previously called 09/08/2024      STAPHYLOCOCCUS AUREUS  ID consult [facility] at [facility]  For susceptibility see order #[id number]      Blood culture [[id number]]  (Abnormal) Collected: 09/07/24 0910    Order Status: Completed Specimen: Blood Updated: 09/10/24 0959     Blood Culture, Routine Gram stain aer bottle: Gram positive cocci in clusters resembling Staph      Gram stain alicia bottle: Gram positive cocci in clusters resembling Staph      Results called to and read back by:Luciana Altamirano LPN 09/08/2024      03:59      STAPHYLOCOCCUS AUREUS  ID consult required at [facility].  For susceptibility see order #[id number]      AFB Culture & Smear [[id number]] Collected: 09/06/24 1446    Order Status: Completed Specimen: Incision site from Ankle, Right Updated: 09/09/24 1545     AFB Culture & Smear Culture in progress     AFB CULTURE STAIN No acid fast bacilli seen.    Narrative:      Right ankle wound - culture    Aerobic culture [[id number]]  (Abnormal)  (Susceptibility) Collected: 09/06/24 1446    Order Status: Completed Specimen: Incision site from Ankle, Right Updated: 09/09/24 1431     Aerobic Bacterial Culture STAPHYLOCOCCUS AUREUS  Many         STREPTOCOCCUS AGALACTIAE (GROUP B)  Many  Beta-hemolytic streptococci are routinely susceptible to   penicillins,cephalosporins and carbapenems.      Narrative:      Right ankle wound - culture    Fungus culture [[id number]] Collected: 09/06/24 1446    Order Status: Completed Specimen: Incision site from Ankle, Right Updated: 09/09/24 1111     Fungus (Mycology) Culture Culture in progress    Narrative:      Right ankle wound - culture    Blood culture x two cultures. Draw prior to antibiotics. [[id number]]  (Abnormal) Collected: 09/06/24 0235    Order Status: Completed Specimen: Blood from Peripheral, Antecubital, Left Updated: 09/08/24 0713     Blood Culture, Routine Gram stain aer bottle: Gram positive cocci in clusters resembling Staph      Gram stain alicia bottle: Gram positive cocci in clusters resembling Staph      Results called to and read back by:Luciana Altamirano RN 09/06/2024      20:55      STAPHYLOCOCCUS AUREUS  For susceptibility see order #[id number]      Narrative:      Aerobic and anaerobic    MRSA/SA Rapid ID by PCR from Blood culture [[id number]]  (Abnormal) Collected: 09/06/24 0235    Order Status: Completed Updated: 09/06/24 2224     Staph aureus ID by PCR Positive     Methicillin Resistant ID by PCR Negative    Narrative:      Aerobic and anaerobic    Gram stain [[id number]] Collected: 09/06/24 1446    Order Status: Completed Specimen: Incision site from Ankle, Right Updated: 09/06/24 1805     Gram Stain Result Rare WBC's      Few Gram positive cocci    Narrative:      Right ankle wound - culture          Wound Culture:   Recent Labs   Lab 09/06/24  1446   LABAERO STAPHYLOCOCCUS AUREUS  Many  *  STREPTOCOCCUS AGALACTIAE (GROUP B)  Many  Beta-hemolytic streptococci are routinely susceptible to   penicillins,cephalosporins and carbapenems.  *       Significant Imaging: I have reviewed all pertinent imaging results/findings within the past 24 hours.

## 2024-09-12 NOTE — PLAN OF CARE
Patient altered more than this AM after receiving fentanyl/versed push for aborted angiogram, was updated by vascular about this and came to see him at bedside as ID Jurgen Carlin also reported he was very altered also on her exam. He was picking at sheets, talking to the wall, and was oriented to examiner but not making sense at times with answers to questions. When asked hospital or hotel he stated hospital. When asked [place] or [facility] he said [facility]. He was able to tell me about who is roommate is and was able to tell me details about him that I knew from last admit (such as that he has an amputation of the leg) but he was not able to tell me other details of specific things that he told me last admit such as a story he told me about his grandmother.  He said his mouth feels very dry and he asked for water, when got a new water jug for him he oened the top up and nearly spillled it on himself and hands trembling at times and was unable to follow me when I told him not to open the top that he would spill it and was not able to comprehend this and reached for it again at times.  Suspect still underlying issue causing his mild confusion before these meds and worsening and likely lingering due to his DEEPALI. Will check ammonia, lactic acid, CK.   CT head already ordered and pending, no signs of acute stroke type of picture as more delirium type of picture before and after given pushes/anesthesia earlier. CT for infectious work up also ordered.   Nursing and charge nursing at bedside and case discussed. Charge nurse shahid checked glucose and was 107. They've both seen him in previous days and report he was not like this at all, as I know him from July but this is my first day having him this admission.  Will f/u further labs and CTs and watch closely.   Not tryign to get out of bed but given confusion will add telesitter to ensure safety given fall risk    Patient with 100 cc of very concentrated urine per nursing  and charge nurse at bedside, bladder scan  now with >400 cc and will place delvalle for retention.      425 pm nursing reports abg with ph 7.365, pco2 831, p02 89, bicarb 18 on 3L. Will bump up o2 a little bit to 5L for now for goal o2 a little higher, in interim while awaiting ct to see if cause for hypoxemia further given cta prev negative, echo normal, and only small opacity on prev and on treatment. Cough on exam earlier but no sputum. Has IS bedside.

## 2024-09-12 NOTE — CARE UPDATE
Cath lab RLE angiogram aborted after needle access secondary to increased work of breathing, altered mental status, not following directions.     Will try and reschedule for tomorrow in OR with monitored anesthesia care pending improvement, creatinine.

## 2024-09-12 NOTE — ASSESSMENT & PLAN NOTE
Hyponatremia is likely due to Dehydration/hypovolemia. The patient's most recent sodium results are listed below.  Recent Labs     09/10/24  0439 09/11/24  1041 09/12/24  0538   * 132* 131*       Plan  - Correct the sodium by 4-6mEq in 24 hours.   - Obtain the following studies: Urine sodium, urine osmolality, serum osmolality.  - Will treat the hyponatremia with IV fluids as follows: NS  - Monitor sodium Daily.   - Patient hyponatremia is improving  Last admit had similar low and improved with volume

## 2024-09-12 NOTE — PLAN OF CARE
Problem: Adult Inpatient Plan of Care  Goal: Plan of Care Review  Outcome: Progressing  Goal: Patient-Specific Goal (Individualized)  Outcome: Progressing  Goal: Absence of Hospital-Acquired Illness or Injury  Outcome: Progressing  Intervention: Identify and Manage Fall Risk  Flowsheets (Taken 9/12/2024 0316)  Safety Promotion/Fall Prevention:   assistive device/personal item within reach   instructed to call staff for mobility   patient expresses understanding of fall risk and prevention   nonskid shoes/socks when out of bed   side rails raised x 2   medications reviewed  Intervention: Prevent Skin Injury  Flowsheets (Taken 9/12/2024 0316)  Body Position: position changed independently  Skin Protection: incontinence pads utilized  Device Skin Pressure Protection: adhesive use limited  Goal: Optimal Comfort and Wellbeing  Outcome: Progressing     Problem: Fall Injury Risk  Goal: Absence of Fall and Fall-Related Injury  Outcome: Progressing     Problem: Wound  Goal: Optimal Coping  Outcome: Progressing     Problem: Diabetes Comorbidity  Goal: Blood Glucose Level Within Targeted Range  Outcome: Progressing   Pt AAOx4. Vital signs as charted. Safety measures in place. Surgical dressing CDI to RLE. Extremity elevated, wound vac in place. No c/o pain during night, PIV in place, continuous abx infusing. BG monitored, glucose tablets administered for BG of 68. Voiding without difficulty. Tolerating diet. No signs of distress. Pt resting, no falls or injuries at this time.

## 2024-09-12 NOTE — SUBJECTIVE & OBJECTIVE
Medications Prior to Admission   Medication Sig Dispense Refill Last Dose    acetaminophen (TYLENOL) 325 MG tablet Take 2 tablets (650 mg total) by mouth every 6 (six) hours.       aspirin (ECOTRIN) 81 MG EC tablet Take 1 tablet (81 mg total) by mouth 2 (two) times a day. End date sept 20, 2024       atorvastatin (LIPITOR) 40 MG tablet Take 40 mg by mouth once daily.       blood-glucose sensor (DEXCOM G6 SENSOR) Omaira Change sensor every 10 days 3 each PRN     blood-glucose transmitter (DEXCOM G6 TRANSMITTER) Omaira Change transmitter every 3 months 1 each PRN     celecoxib (CELEBREX) 200 MG capsule Take 1 capsule (200 mg total) by mouth once daily.       cyanocobalamin (VITAMIN B-12) 1000 MCG tablet Take 1,000 mcg by mouth once daily.       DULoxetine (CYMBALTA) 60 MG capsule Take 1 capsule (60 mg total) by mouth once daily. 90 capsule 0     emtricitabine-tenofovir 200-300 mg (TRUVADA) 200-300 mg Tab Take 1 tablet by mouth once daily. 30 tablet 0     enoxaparin (LOVENOX) 40 mg/0.4 mL Syrg Inject 40 mg into the skin Daily.       fish oil-omega-3 fatty acids 300-1,000 mg capsule Take 1 capsule by mouth 2 (two) times daily.       gabapentin (NEURONTIN) 300 MG capsule Take 2 capsules (600 mg total) by mouth once daily AND 4 capsules (1,200 mg total) every evening.       HUMALOG U-100 INSULIN 100 unit/mL injection 1:20 U PRN high blood sugar and snacks. Correction dose- Enter carb coverage intake upon administration  Target number 120 Carbohydrate coverage #1 1:2 Carbohydrate coverage #1 time [id number] Carbohydrate coverage #2 1:3 Carbohydrate coverage #2 time [id number] Carbohydrate coverage #3 Carbohydrate coverage #3 time Carbohydrate coverage #4 Carbohydrate coverage #4 time Sensitivity #1 1:20 Sensitivity #1 time 9036-0721 Sensitivity #2 Sensitivity #2 time Sensitivity #3 Sensitivity #3 time Sensitivity #4 Sensitivity #4 time Sensitivity #5 Sensitivity #5 time Order Questions Question Answer Comment       50 U SQ  continuous  Target number 120 Basal Rate #1 2.3 Basal rate #1 time [id number] Basal Rate #2 1.55 Basal rate #2 time 0745-6648 Basal rate #3 Basal rate #3 time Basal rate #4 Basal rate #4 time Basal rate #5 Basal rate #5 time       losartan (COZAAR) 25 MG tablet Take 1 tablet (25 mg total) by mouth once daily.       magnesium oxide (MAG-OX) 400 mg (241.3 mg magnesium) tablet Take 0.5 tablets (200 mg total) by mouth once daily.       melatonin (MELATIN) 3 mg tablet Take 2 tablets (6 mg total) by mouth nightly as needed for Insomnia.       methocarbamoL (ROBAXIN) 500 MG Tab Take 1 tablet (500 mg total) by mouth 4 (four) times daily as needed (pain scale 4-7).       morphine (MSIR) 15 MG tablet Take 1 tablet (15 mg total) by mouth every 4 (four) hours as needed (pain scale 6-10).       MOUNJARO 10 mg/0.5 mL PnIj Inject 10 mg into the skin once a week. HOLD until all surgeries completed and out of rehab       nortriptyline (PAMELOR) 50 MG capsule Take 1 capsule (50 mg total) by mouth nightly. 90 capsule 0     ondansetron (ZOFRAN-ODT) 4 MG TbDL Take 2 tablets (8 mg total) by mouth every 6 (six) hours as needed (nausea).       polyethylene glycol (GLYCOLAX) 17 gram PwPk Take 17 g by mouth once daily.       rosuvastatin (CRESTOR) 10 MG tablet Take 10 mg by mouth every evening.       senna-docusate 8.6-50 mg (PERICOLACE) 8.6-50 mg per tablet Take 1 tablet by mouth 2 (two) times daily.       vitamin D 1000 units Tab Take 1,000 Units by mouth 2 (two) times daily.          Review of patient's allergies indicates:   Allergen Reactions    Invokana [canagliflozin] Anaphylaxis    Percocet [oxycodone-acetaminophen] Nausea Only and Hallucinations    Biaxin [clarithromycin]     Hydrocodone Other (See Comments)     Dizzy/nausea/hallucinations    Sulfa (sulfonamide antibiotics) Nausea Only and Rash       Past Medical History:   Diagnosis Date    Anxiety 12/18/2012    Back pain 12/18/2012    Cataract     Chronic pain syndrome 04/24/2016     Diabetes type 2, controlled 02/20/2016    Diabetic retinopathy     DM (diabetes mellitus) 12/18/2012    DM (diabetes mellitus), type 2, uncontrolled 11/16/2013    Gastroesophageal reflux disease without esophagitis 02/20/2016    Hyperlipidemia 12/18/2012    Insomnia 08/07/2014    Neuropathy 11/16/2013     Past Surgical History:   Procedure Laterality Date    APPLICATION OF WOUND VACUUM-ASSISTED CLOSURE DEVICE Right 9/6/2024    Procedure: APPLICATION, WOUND VAC;  Surgeon: Moe Liz MD;  [facility];  Service: Orthopedics;  Laterality: Right;    APPLICATION, EXTERNAL FIXATION DEVICE, FOR ANKLE FRACTURE Right 7/18/2024    Procedure: APPLICATION, EXTERNAL FIXATION DEVICE, FOR ANKLE FRACTURE;  Surgeon: Moe Liz MD;  [facility];  Service: Orthopedics;  Laterality: Right;    ARTHROTOMY OF ANKLE Right 9/9/2024    Procedure: ARTHROTOMY, ANKLE;  Surgeon: Moe Liz MD;  [facility];  Service: Orthopedics;  Laterality: Right;    BACK SURGERY  2003    Lumbar Spine    CATARACT EXTRACTION      CLOSED REDUCTION, FRACTURE, ANKLE, TRIMALLEOLAR Right 7/18/2024    Procedure: CLOSED REDUCTION, FRACTURE, ANKLE, TRIMALLEOLAR;  Surgeon: Moe Liz MD;  [facility];  Service: Orthopedics;  Laterality: Right;    EPIDURAL STEROID INJECTION N/A 1/16/2019    Procedure: Injection, Steroid, Epidural Cervical;  Surgeon: Andrew Sinclair Jr., MD;  Location: [facility] ENDO;  Service: Pain Management;  Laterality: N/A;  Cervical Epidural Steroid Injection C7-T1    43651    Arrive @ 1240    EPIDURAL STEROID INJECTION N/A 2/20/2019    Procedure: Injection, Steroid, Epidural;  Surgeon: Andrew Sinclair Jr., MD;  Location: [facility] ENDO;  Service: Pain Management;  Laterality: N/A;  Lumbar Epidural Steroid Injection L4-5    84040    Arrive @ 1215 (requests latest time)    FIXATION OF SYNDESMOSIS OF ANKLE Right 7/26/2024    Procedure: FIXATION, SYNDESMOSIS, ANKLE;   Surgeon: Moe Liz MD;  Location: [facility] OR [address] [place] [address];  Service: Orthopedics;  Laterality: Right;    INJECTION, SPINE, LUMBOSACRAL, TRANSFORAMINAL APPROACH Bilateral 6/14/2024    Procedure: Bilateral L5 Transforaminal Epidural Steroid Injections;  Surgeon: Andrew Sinclair Jr., MD;  Location: [facility] PAIN MANAGEMENT;  Service: Pain Management;  Laterality: Bilateral;  @1300(given)  ASA last 6/8  Check BG  MD Sign.    IRRIGATION AND DEBRIDEMENT Right 9/6/2024    Procedure: IRRIGATION AND DEBRIDEMENT;  Surgeon: Moe Liz MD;  Location: [address];  Service: Orthopedics;  Laterality: Right;    IRRIGATION AND DEBRIDEMENT OF LOWER EXTREMITY Right 9/9/2024    Procedure: IRRIGATION AND DEBRIDEMENT, LOWER EXTREMITY - WITH WOUND VAC CHANGE, RIGHT ANKLE;  Surgeon: Moe Liz MD;  Location: [address];  Service: Orthopedics;  Laterality: Right;  WITH WOUND VAC CHANGE, RIGHT ANKLE    OPEN REDUCTION AND INTERNAL FIXATION (ORIF) OF FRACTURE OF DISTAL RADIUS Left 7/19/2024    Procedure: ORIF, FRACTURE, RADIUS, DISTAL - LEFT;  Surgeon: Moe Liz MD;  Location: [facility] OR [address] [place] [address];  Service: Orthopedics;  Laterality: Left;  LEFT, SYNTHES, C ARM    OPEN REDUCTION AND INTERNAL FIXATION (ORIF) OF INJURY OF ANKLE Right 7/26/2024    Procedure: ORIF, ANKLE;  Surgeon: Moe Liz MD;  Location: [facility] OR [address] [place] [address];  Service: Orthopedics;  Laterality: Right;    REMOVAL OF EXTERNAL FIXATION DEVICE Right 7/26/2024    Procedure: REMOVAL, EXTERNAL FIXATION DEVICE;  Surgeon: Moe Liz MD;  Location: [address];  Service: Orthopedics;  Laterality: Right;     Family History       Problem Relation (Age of Onset)    Alzheimer's disease Father    Cataracts Father    Heart attack Mother    Hypertension Father, Mother    Migraines Mother    Parkinsonism Father          Tobacco Use    Smoking status: Never    Smokeless tobacco: Never   Substance and Sexual Activity    Alcohol use: Yes      Alcohol/week: 1.0 standard drink of alcohol     Types: 1 Glasses of wine per week     Comment: occasionally    Drug use: No    Sexual activity: Not Currently     Partners: Male     Birth control/protection: Condom     Comment: 10/2/17      Review of Systems   Constitutional:  Negative for activity change and appetite change.   Respiratory:  Negative for chest tightness.    Cardiovascular:  Negative for chest pain.   Skin:  Positive for wound. Negative for color change.   Neurological:  Negative for weakness and numbness.     Objective:     Vital Signs (Most Recent):  Temp: 100.1 °F (37.8 °C) (09/12/24 0734)  Pulse: 110 (09/12/24 0734)  Resp: 20 (09/12/24 0734)  BP: 132/84 (09/12/24 0734)  SpO2: 99 % (09/12/24 0734) Vital Signs (24h Range):  Temp:  [98.1 °F (36.7 °C)-100.1 °F (37.8 °C)] 100.1 °F (37.8 °C)  Pulse:  [100-118] 110  Resp:  [16-22] 20  SpO2:  [94 %-99 %] 99 %  BP: (127-174)/(61-84) 132/84     Weight: 93 kg (205 lb 0.4 oz)  Body mass index is 34.12 kg/m².      Physical Exam  Eyes:      Pupils: Pupils are equal, round, and reactive to light.   Cardiovascular:      Rate and Rhythm: Normal rate.      Comments: Unable to assess right lower extremity signals given presence of cast.  Pulmonary:      Effort: Pulmonary effort is normal.   Abdominal:      General: Abdomen is flat.   Musculoskeletal:      Comments: RLE in cast.    Skin:     General: Skin is warm.   Neurological:      Mental Status: He is alert.          Significant Labs:  All pertinent labs from the last 24 hours have been reviewed.    Significant Diagnostics:  I have reviewed all pertinent imaging results/findings within the past 24 hours.  RLE CTA reviewed, patent SFA, popliteal, tibial disease present.

## 2024-09-12 NOTE — ASSESSMENT & PLAN NOTE
64 year old with poorly controlled DM (A1C 8.5), recent fall resulting in left wrist fx and right ankle fracture 07/18/24 s/p ex fix Rt ankle and ORIF left wrist 07/19, and later subsequent ORIF right ankle on 7/26/24 with Dr. Liz -- now c/b deep surgical site infection of right ankle involving underlying hardware with associated bacteremia.   No SOI in left wrist.      S/p I&D on 09/06 and 9/9.  Op report noting purulence overlying hardware.  No plans for hardware removal at this time.  Unable to achieve primary closure and Plastic Surgery consulted.   A CTA of  lower extremity showed multifocal high grade stenosis throughout right calf, though no occlusion and Vascular Surgery consulted. Right TBI .41. Attempted angiogram today, but developed extreme confusion/AMS with versed, rapid respiratory rate with versed/fentanyl and procedure postponed.     Plastics recommendations are pending Vascular workup.      Surgical cxs +GBS, MSSA.  Blood cultures 9/6 and 9/7 + for MSSA.  Repeat blood cultures 9/8 NGTD, 9/11.  2D echo negative.   Currently on IV oxacillin for MSSA.   Has been afebrile X 48 hours. Still with leukocytosis.  Also with urinary retention today.  Atkinson placed.  Urine WBC 31. Culture pending.  Creatinine now 2.6.  ? Related to urinary retention?     Recommendations / Plan:  Continue IV oxacillin 12 g q 24 hours continuous infusion.  Continue to monitor liver enzymes.   Anticipate 6 weeks of IV antibiotics from date of most recent washout.    Will consider adding adjunctive rifampin for biofilm penetration if hardware is to remain - will wait to add until other issues resolved. Will discuss significance of potential interaction with truvada with ID PharmD  Anticipate long term oral antibiotic suppression following IV abx   Follow up CT C/A/P (see below)  Follow up urine culture  Will follow.      Data reviewed and plan discussed with ID staff  Secure chat/Discussed above plan with Primary Team

## 2024-09-12 NOTE — SUBJECTIVE & OBJECTIVE
Interval History: known to myself from his first admit in July, in interim now with infected right ankle surgical wound with MSSA bacteremia on admit with ID following with wound Cx with staph and group B strep with CTA showing high grade stenosis in RLE, with THEO today with possible angiogram today as possible plastics flap consideration and vascular work up in process prior to consideration of this for ortho/plastic surgery considation further. Wound vac in place with darker red output, NWB to RLE. On oxygen, he reports minimal dyspnea but at times has mild dyspnea when talking. Hypoglycemiac this AM and he reports was given glucose tabs. Endo managing insulin regimen as previous admit and at home on insulin pump but unabel to do at this admit at this time. Has had transaminiits that is mild but fluctuating with mild improvement in AST elevation today at 96. Prev notes say were holding hiv ppx but was still on MAR so stopped. Dec tylenol PRNS to 500 from 1 gram but had not been receiving. ID monitoring antibitoic regimens with these small elevations. Repeat procal of 2 as wbc still higher at 22 and on doxy for resp coverage as possible infiltrate on CTA while PE ruled out. Hg 8.5. cr 2.6 and higher than baseline. Last admit had issues of hyponatremia and volume depletion as cause, so will start light IVF and check urine studies as well as Na studies. Sinus tachy as well. He says he is drinking adequately but unclear as has had some confusion at times. Is not completely atb aseline based on previous admission. Roommate hugo was here last admit who I met. He had a few statements he made that were slightly off to situation but was able to give most of history accurately to me but not fully himself overall from previous.     Review of Systems   Constitutional:  Positive for activity change and fatigue. Negative for chills and fever.   Respiratory:  Positive for cough. Negative for chest tightness and shortness of  breath.    Cardiovascular:  Negative for chest pain and leg swelling.   Gastrointestinal:  Positive for nausea. Negative for abdominal pain.   Musculoskeletal:  Positive for arthralgias.   Skin:  Positive for color change and wound.   Neurological:  Negative for dizziness and weakness.     Objective:     Vital Signs (Most Recent):  Temp: 100.1 °F (37.8 °C) (09/12/24 0734)  Pulse: 110 (09/12/24 0734)  Resp: 20 (09/12/24 0734)  BP: 132/84 (09/12/24 0734)  SpO2: 99 % (09/12/24 0734) Vital Signs (24h Range):  Temp:  [98.1 °F (36.7 °C)-100.1 °F (37.8 °C)] 100.1 °F (37.8 °C)  Pulse:  [100-118] 110  Resp:  [16-22] 20  SpO2:  [94 %-99 %] 99 %  BP: (127-174)/(61-84) 132/84     Weight: 93 kg (205 lb 0.4 oz)  Body mass index is 34.12 kg/m².    Intake/Output Summary (Last 24 hours) at 9/12/2024 1000  Last data filed at 9/12/2024 0734  Gross per 24 hour   Intake 342 ml   Output 1550 ml   Net -1208 ml         Physical Exam  Vitals and nursing note reviewed.   Constitutional:       Appearance: He is well-developed. He is obese. He is ill-appearing.      Comments: Not fully at baseline. On oxygen   Eyes:      Pupils: Pupils are equal, round, and reactive to light.   Cardiovascular:      Rate and Rhythm: Regular rhythm. Tachycardia present.   Pulmonary:      Effort: Pulmonary effort is normal.      Breath sounds: Rales (bibasilar) present.      Comments: On 2L NC.  Abdominal:      Palpations: Abdomen is soft.      Tenderness: There is no abdominal tenderness.   Musculoskeletal:         General: No tenderness.      Comments: Bandages in place. Wound vac with dark red output.   Skin:     General: Skin is warm and dry.      Comments: C/d/I dressing to R ankle with wound vac.    Neurological:      Mental Status: He is alert and oriented to person, place, and time.   Psychiatric:         Behavior: Behavior normal.             Significant Labs: All pertinent labs within the past 24 hours have been reviewed.  CBC:   Recent Labs   Lab  09/11/24  1041 09/12/24  0537   WBC 21.86* 22.35*   HGB 9.0* 8.5*   HCT 27.1* 26.0*   * 520*     CMP:   Recent Labs   Lab 09/11/24  1041 09/12/24  0538   * 131*   K 3.4* 3.7    103   CO2 18* 17*   * 56*   BUN 30* 31*   CREATININE 2.1* 2.6*   CALCIUM 7.7* 7.9*   PROT 5.9* 6.5   ALBUMIN 1.3* 1.4*   BILITOT 0.4 0.4   ALKPHOS 549* 484*   * 96*   ALT 19 9*   ANIONGAP 10 11       Significant Imaging: I have reviewed all pertinent imaging results/findings within the past 24 hours.

## 2024-09-12 NOTE — CONSULTS
[facility]  Vascular Surgery  Consult Note    Inpatient consult to Vascular Surgery  Consult performed by: Johan Odom MD  Consult ordered by: Kristian Silva MD        Subjective:     Chief Complaint/Reason for Admission: Nonhealing RLE wound     History of Present Illness: 64-year-old gentleman with history of hypertension, DM diabetes, nonsmoker who is presenting with a nonhealing right ankle wound status post previous orthopedic surgery, requiring a vascular evaluation for plastics flap coverage of wound.    Currently on an aspirin, statin.    Medications Prior to Admission   Medication Sig Dispense Refill Last Dose    acetaminophen (TYLENOL) 325 MG tablet Take 2 tablets (650 mg total) by mouth every 6 (six) hours.       aspirin (ECOTRIN) 81 MG EC tablet Take 1 tablet (81 mg total) by mouth 2 (two) times a day. End date sept 20, 2024       atorvastatin (LIPITOR) 40 MG tablet Take 40 mg by mouth once daily.       blood-glucose sensor (DEXCOM G6 SENSOR) Omaira Change sensor every 10 days 3 each PRN     blood-glucose transmitter (DEXCOM G6 TRANSMITTER) Omaira Change transmitter every 3 months 1 each PRN     celecoxib (CELEBREX) 200 MG capsule Take 1 capsule (200 mg total) by mouth once daily.       cyanocobalamin (VITAMIN B-12) 1000 MCG tablet Take 1,000 mcg by mouth once daily.       DULoxetine (CYMBALTA) 60 MG capsule Take 1 capsule (60 mg total) by mouth once daily. 90 capsule 0     emtricitabine-tenofovir 200-300 mg (TRUVADA) 200-300 mg Tab Take 1 tablet by mouth once daily. 30 tablet 0     enoxaparin (LOVENOX) 40 mg/0.4 mL Syrg Inject 40 mg into the skin Daily.       fish oil-omega-3 fatty acids 300-1,000 mg capsule Take 1 capsule by mouth 2 (two) times daily.       gabapentin (NEURONTIN) 300 MG capsule Take 2 capsules (600 mg total) by mouth once daily AND 4 capsules (1,200 mg total) every evening.       HUMALOG U-100 INSULIN 100 unit/mL injection 1:20 U PRN high blood sugar and snacks. Correction dose-  Enter carb coverage intake upon administration  Target number 120 Carbohydrate coverage #1 1:2 Carbohydrate coverage #1 time 8220-3316 Carbohydrate coverage #2 1:3 Carbohydrate coverage #2 time 3977-1155 Carbohydrate coverage #3 Carbohydrate coverage #3 time Carbohydrate coverage #4 Carbohydrate coverage #4 time Sensitivity #1 1:20 Sensitivity #1 time 1659-0480 Sensitivity #2 Sensitivity #2 time Sensitivity #3 Sensitivity #3 time Sensitivity #4 Sensitivity #4 time Sensitivity #5 Sensitivity #5 time Order Questions Question Answer Comment       50 U SQ continuous  Target number 120 Basal Rate #1 2.3 Basal rate #1 time 9458-6695 Basal Rate #2 1.55 Basal rate #2 time 1801-1493 Basal rate #3 Basal rate #3 time Basal rate #4 Basal rate #4 time Basal rate #5 Basal rate #5 time       losartan (COZAAR) 25 MG tablet Take 1 tablet (25 mg total) by mouth once daily.       magnesium oxide (MAG-OX) 400 mg (241.3 mg magnesium) tablet Take 0.5 tablets (200 mg total) by mouth once daily.       melatonin (MELATIN) 3 mg tablet Take 2 tablets (6 mg total) by mouth nightly as needed for Insomnia.       methocarbamoL (ROBAXIN) 500 MG Tab Take 1 tablet (500 mg total) by mouth 4 (four) times daily as needed (pain scale 4-7).       morphine (MSIR) 15 MG tablet Take 1 tablet (15 mg total) by mouth every 4 (four) hours as needed (pain scale 6-10).       MOUNJARO 10 mg/0.5 mL PnIj Inject 10 mg into the skin once a week. HOLD until all surgeries completed and out of rehab       nortriptyline (PAMELOR) 50 MG capsule Take 1 capsule (50 mg total) by mouth nightly. 90 capsule 0     ondansetron (ZOFRAN-ODT) 4 MG TbDL Take 2 tablets (8 mg total) by mouth every 6 (six) hours as needed (nausea).       polyethylene glycol (GLYCOLAX) 17 gram PwPk Take 17 g by mouth once daily.       rosuvastatin (CRESTOR) 10 MG tablet Take 10 mg by mouth every evening.       senna-docusate 8.6-50 mg (PERICOLACE) 8.6-50 mg per tablet Take 1 tablet by mouth 2 (two) times  daily.       vitamin D 1000 units Tab Take 1,000 Units by mouth 2 (two) times daily.          Review of patient's allergies indicates:   Allergen Reactions    Invokana [canagliflozin] Anaphylaxis    Percocet [oxycodone-acetaminophen] Nausea Only and Hallucinations    Biaxin [clarithromycin]     Hydrocodone Other (See Comments)     Dizzy/nausea/hallucinations    Sulfa (sulfonamide antibiotics) Nausea Only and Rash       Past Medical History:   Diagnosis Date    Anxiety 12/18/2012    Back pain 12/18/2012    Cataract     Chronic pain syndrome 04/24/2016    Diabetes type 2, controlled 02/20/2016    Diabetic retinopathy     DM (diabetes mellitus) 12/18/2012    DM (diabetes mellitus), type 2, uncontrolled 11/16/2013    Gastroesophageal reflux disease without esophagitis 02/20/2016    Hyperlipidemia 12/18/2012    Insomnia 08/07/2014    Neuropathy 11/16/2013     Past Surgical History:   Procedure Laterality Date    APPLICATION OF WOUND VACUUM-ASSISTED CLOSURE DEVICE Right 9/6/2024    Procedure: APPLICATION, WOUND VAC;  Surgeon: Moe Liz MD;  Location: [facility] OR [address] [place] [address];  Service: Orthopedics;  Laterality: Right;    APPLICATION, EXTERNAL FIXATION DEVICE, FOR ANKLE FRACTURE Right 7/18/2024    Procedure: APPLICATION, EXTERNAL FIXATION DEVICE, FOR ANKLE FRACTURE;  Surgeon: Moe Liz MD;  Location: [facility] OR [address] [place] [address];  Service: Orthopedics;  Laterality: Right;    ARTHROTOMY OF ANKLE Right 9/9/2024    Procedure: ARTHROTOMY, ANKLE;  Surgeon: Moe Liz MD;  Location: [facility] OR [address] [place] [address];  Service: Orthopedics;  Laterality: Right;    BACK SURGERY  2003    Lumbar Spine    CATARACT EXTRACTION      CLOSED REDUCTION, FRACTURE, ANKLE, TRIMALLEOLAR Right 7/18/2024    Procedure: CLOSED REDUCTION, FRACTURE, ANKLE, TRIMALLEOLAR;  Surgeon: Moe Liz MD;  Location: [facility] OR [address] [place] [address];  Service: Orthopedics;  Laterality: Right;    EPIDURAL STEROID INJECTION N/A 1/16/2019    Procedure: Injection, Steroid, Epidural  Cervical;  Surgeon: Andrew Sinclair Jr., MD;  Location: [facility] ENDO;  Service: Pain Management;  Laterality: N/A;  Cervical Epidural Steroid Injection C7-T1    16095    Arrive @ 1240    EPIDURAL STEROID INJECTION N/A 2/20/2019    Procedure: Injection, Steroid, Epidural;  Surgeon: Andrew Sinclair Jr., MD;  Location: [facility] ENDO;  Service: Pain Management;  Laterality: N/A;  Lumbar Epidural Steroid Injection L4-5    53151    Arrive @ 1215 (requests latest time)    FIXATION OF SYNDESMOSIS OF ANKLE Right 7/26/2024    Procedure: FIXATION, SYNDESMOSIS, ANKLE;  Surgeon: Moe Liz MD;  Location: [facility] OR 2ND FLR;  Service: Orthopedics;  Laterality: Right;    INJECTION, SPINE, LUMBOSACRAL, TRANSFORAMINAL APPROACH Bilateral 6/14/2024    Procedure: Bilateral L5 Transforaminal Epidural Steroid Injections;  Surgeon: Andrew Sinclair Jr., MD;  Location: [facility] PAIN MANAGEMENT;  Service: Pain Management;  Laterality: Bilateral;  @1300(given)  ASA last 6/8  Check BG  MD Sign.    IRRIGATION AND DEBRIDEMENT Right 9/6/2024    Procedure: IRRIGATION AND DEBRIDEMENT;  Surgeon: Moe Liz MD;  Location: [facility] OR 2ND FLR;  Service: Orthopedics;  Laterality: Right;    IRRIGATION AND DEBRIDEMENT OF LOWER EXTREMITY Right 9/9/2024    Procedure: IRRIGATION AND DEBRIDEMENT, LOWER EXTREMITY - WITH WOUND VAC CHANGE, RIGHT ANKLE;  Surgeon: Moe Liz MD;  Location: [facility] OR 2ND FLR;  Service: Orthopedics;  Laterality: Right;  WITH WOUND VAC CHANGE, RIGHT ANKLE    OPEN REDUCTION AND INTERNAL FIXATION (ORIF) OF FRACTURE OF DISTAL RADIUS Left 7/19/2024    Procedure: ORIF, FRACTURE, RADIUS, DISTAL - LEFT;  Surgeon: Moe Liz MD;  Location: [facility] OR 2ND FLR;  Service: Orthopedics;  Laterality: Left;  LEFT, SYNTHES, C ARM    OPEN REDUCTION AND INTERNAL FIXATION (ORIF) OF INJURY OF ANKLE Right 7/26/2024    Procedure: ORIF, ANKLE;  Surgeon: Moe Liz MD;  Location: [facility] OR 2ND FLR;  Service: Orthopedics;   Laterality: Right;    REMOVAL OF EXTERNAL FIXATION DEVICE Right 7/26/2024    Procedure: REMOVAL, EXTERNAL FIXATION DEVICE;  Surgeon: Moe Liz MD;  Location: [facility] OR [address] [place] [address];  Service: Orthopedics;  Laterality: Right;     Family History       Problem Relation (Age of Onset)    Alzheimer's disease Father    Cataracts Father    Heart attack Mother    Hypertension Father, Mother    Migraines Mother    Parkinsonism Father          Tobacco Use    Smoking status: Never    Smokeless tobacco: Never   Substance and Sexual Activity    Alcohol use: Yes     Alcohol/week: 1.0 standard drink of alcohol     Types: 1 Glasses of wine per week     Comment: occasionally    Drug use: No    Sexual activity: Not Currently     Partners: Male     Birth control/protection: Condom     Comment: 10/2/17      Review of Systems   Constitutional:  Negative for activity change and appetite change.   Skin:  Positive for wound. Negative for color change.   Neurological:  Negative for weakness and numbness.     Objective:     Vital Signs (Most Recent):  Temp: 100.1 °F (37.8 °C) (09/12/24 0734)  Pulse: 110 (09/12/24 0734)  Resp: 20 (09/12/24 0734)  BP: 132/84 (09/12/24 0734)  SpO2: 99 % (09/12/24 0734) Vital Signs (24h Range):  Temp:  [98.1 °F (36.7 °C)-100.1 °F (37.8 °C)] 100.1 °F (37.8 °C)  Pulse:  [100-118] 110  Resp:  [16-22] 20  SpO2:  [94 %-99 %] 99 %  BP: (127-174)/(61-84) 132/84     Weight: 93 kg (205 lb 0.4 oz)  Body mass index is 34.12 kg/m².      Physical Exam  Eyes:      Pupils: Pupils are equal, round, and reactive to light.   Cardiovascular:      Rate and Rhythm: Normal rate.      Pulses: Normal pulses.      Comments: Unable to assess right lower extremity signals given presence of cast.  Neurological:      Mental Status: He is alert.          Significant Labs:  All pertinent labs from the last 24 hours have been reviewed.    Significant Diagnostics:  I have reviewed all pertinent imaging results/findings within the past 24  hours.  Assessment/Plan:     PAD (peripheral artery disease)  64-year-old gentleman presenting with nonhealing wound of the right lower extremity.    - Will obtain toe pressures.  - Plan for right lower extremity angiogram today.  - Please keep NPO.        Thank you for your consult.     Johan Odom MD  Vascular Surgery  [facility]

## 2024-09-12 NOTE — ASSESSMENT & PLAN NOTE
64-year-old gentleman presenting with nonhealing wound of the right lower extremity.    - Will obtain toe pressures.  - Plan for right lower extremity angiogram today.  - Please keep NPO.

## 2024-09-12 NOTE — SUBJECTIVE & OBJECTIVE
"Interval HPI:   No acute events overnight. Patient in room 542/542 A. Blood glucose stable. BG below goal on current insulin regimen (SSI, prandial, and basal insulin ). Steroid use- None.   3 Days Post-Op  Renal function-   Lab Results   Component Value Date    CREATININE 2.6 (H) 2024        Vasopressors-  None     Diet diabetic  Calorie     Eatin%  Nausea: No  Hypoglycemia and intervention: Yes- pre-bedtime and overnight fasting BG 50s-60s. Will reduce scheduled insulin doses.   Fever: No  TPN and/or TF: No    /84 (BP Location: Left arm, Patient Position: Sitting)   Pulse 110   Temp 100.1 °F (37.8 °C) (Axillary)   Resp 20   Ht 5' 5" (1.651 m)   Wt 93 kg (205 lb 0.4 oz)   SpO2 99%   BMI 34.12 kg/m²     Labs Reviewed and Include    Recent Labs   Lab 24  0538   GLU 56*   CALCIUM 7.9*   ALBUMIN 1.4*   PROT 6.5   *   K 3.7   CO2 17*      BUN 31*   CREATININE 2.6*   ALKPHOS 484*   ALT 9*   AST 96*   BILITOT 0.4     Lab Results   Component Value Date    WBC 22.35 (H) 2024    HGB 8.5 (L) 2024    HCT 26.0 (L) 2024    MCV 86 2024     (H) 2024     No results for input(s): "TSH", "FREET4" in the last 168 hours.  Lab Results   Component Value Date    HGBA1C 8.5 (H) 2024       Nutritional status:   Body mass index is 34.12 kg/m².  Lab Results   Component Value Date    ALBUMIN 1.4 (L) 2024    ALBUMIN 1.3 (L) 2024    ALBUMIN 1.3 (L) 09/10/2024     Lab Results   Component Value Date    PREALBUMIN 3 (L) 2024    PREALBUMIN 13 (L) 2024       Estimated Creatinine Clearance: 30.5 mL/min (A) (based on SCr of 2.6 mg/dL (H)).    Accu-Checks  Recent Labs     09/10/24  0726 09/10/24  1127 09/10/24  1558 09/10/24  2134 24  0746 24  1114 24  1543 24  2119 24  2316 24  0727   POCTGLUCOSE 212* 254* 153* 205* 139* 107 90 68* 89 66*       Current Medications and/or Treatments Impacting Glycemic " Control  Immunotherapy:    Immunosuppressants       None          Steroids:   Hormones (From admission, onward)      None          Pressors:    Autonomic Drugs (From admission, onward)      None          Hyperglycemia/Diabetes Medications:   Antihyperglycemics (From admission, onward)      Start     Stop Route Frequency Ordered    09/12/24 0900  insulin glargine U-100 (Lantus) pen 35 Units         -- SubQ Daily 09/12/24 0713    09/12/24 0715  insulin aspart U-100 pen 9 Units         -- SubQ 3 times daily with meals 09/11/24 1649    09/11/24 0959  insulin aspart U-100 pen 0-10 Units         -- SubQ Before meals & nightly PRN 09/11/24 0859

## 2024-09-12 NOTE — CONSULTS
[facility]  Vascular Surgery  Consult Note    Inpatient consult to Vascular Surgery  Consult performed by: Johan Odom MD  Consult ordered by: Kristian Silva MD        Subjective:     Chief Complaint/Reason for Admission: Nonhealing RLE wound     History of Present Illness: 64-year-old gentleman with history of hypertension, DM diabetes, nonsmoker who is presenting with a nonhealing right ankle wound status post previous orthopedic surgery, requiring a vascular evaluation for plastics flap coverage of wound.    Currently on an aspirin, statin.    Medications Prior to Admission   Medication Sig Dispense Refill Last Dose    acetaminophen (TYLENOL) 325 MG tablet Take 2 tablets (650 mg total) by mouth every 6 (six) hours.       aspirin (ECOTRIN) 81 MG EC tablet Take 1 tablet (81 mg total) by mouth 2 (two) times a day. End date sept 20, 2024       atorvastatin (LIPITOR) 40 MG tablet Take 40 mg by mouth once daily.       blood-glucose sensor (DEXCOM G6 SENSOR) Omaira Change sensor every 10 days 3 each PRN     blood-glucose transmitter (DEXCOM G6 TRANSMITTER) Omaira Change transmitter every 3 months 1 each PRN     celecoxib (CELEBREX) 200 MG capsule Take 1 capsule (200 mg total) by mouth once daily.       cyanocobalamin (VITAMIN B-12) 1000 MCG tablet Take 1,000 mcg by mouth once daily.       DULoxetine (CYMBALTA) 60 MG capsule Take 1 capsule (60 mg total) by mouth once daily. 90 capsule 0     emtricitabine-tenofovir 200-300 mg (TRUVADA) 200-300 mg Tab Take 1 tablet by mouth once daily. 30 tablet 0     enoxaparin (LOVENOX) 40 mg/0.4 mL Syrg Inject 40 mg into the skin Daily.       fish oil-omega-3 fatty acids 300-1,000 mg capsule Take 1 capsule by mouth 2 (two) times daily.       gabapentin (NEURONTIN) 300 MG capsule Take 2 capsules (600 mg total) by mouth once daily AND 4 capsules (1,200 mg total) every evening.       HUMALOG U-100 INSULIN 100 unit/mL injection 1:20 U PRN high blood sugar and snacks. Correction dose-  Enter carb coverage intake upon administration  Target number 120 Carbohydrate coverage #1 1:2 Carbohydrate coverage #1 time 5153-4604 Carbohydrate coverage #2 1:3 Carbohydrate coverage #2 time 2749-4297 Carbohydrate coverage #3 Carbohydrate coverage #3 time Carbohydrate coverage #4 Carbohydrate coverage #4 time Sensitivity #1 1:20 Sensitivity #1 time 3614-8501 Sensitivity #2 Sensitivity #2 time Sensitivity #3 Sensitivity #3 time Sensitivity #4 Sensitivity #4 time Sensitivity #5 Sensitivity #5 time Order Questions Question Answer Comment       50 U SQ continuous  Target number 120 Basal Rate #1 2.3 Basal rate #1 time 3715-1422 Basal Rate #2 1.55 Basal rate #2 time 4815-7114 Basal rate #3 Basal rate #3 time Basal rate #4 Basal rate #4 time Basal rate #5 Basal rate #5 time       losartan (COZAAR) 25 MG tablet Take 1 tablet (25 mg total) by mouth once daily.       magnesium oxide (MAG-OX) 400 mg (241.3 mg magnesium) tablet Take 0.5 tablets (200 mg total) by mouth once daily.       melatonin (MELATIN) 3 mg tablet Take 2 tablets (6 mg total) by mouth nightly as needed for Insomnia.       methocarbamoL (ROBAXIN) 500 MG Tab Take 1 tablet (500 mg total) by mouth 4 (four) times daily as needed (pain scale 4-7).       morphine (MSIR) 15 MG tablet Take 1 tablet (15 mg total) by mouth every 4 (four) hours as needed (pain scale 6-10).       MOUNJARO 10 mg/0.5 mL PnIj Inject 10 mg into the skin once a week. HOLD until all surgeries completed and out of rehab       nortriptyline (PAMELOR) 50 MG capsule Take 1 capsule (50 mg total) by mouth nightly. 90 capsule 0     ondansetron (ZOFRAN-ODT) 4 MG TbDL Take 2 tablets (8 mg total) by mouth every 6 (six) hours as needed (nausea).       polyethylene glycol (GLYCOLAX) 17 gram PwPk Take 17 g by mouth once daily.       rosuvastatin (CRESTOR) 10 MG tablet Take 10 mg by mouth every evening.       senna-docusate 8.6-50 mg (PERICOLACE) 8.6-50 mg per tablet Take 1 tablet by mouth 2 (two) times  daily.       vitamin D 1000 units Tab Take 1,000 Units by mouth 2 (two) times daily.          Review of patient's allergies indicates:   Allergen Reactions    Invokana [canagliflozin] Anaphylaxis    Percocet [oxycodone-acetaminophen] Nausea Only and Hallucinations    Biaxin [clarithromycin]     Hydrocodone Other (See Comments)     Dizzy/nausea/hallucinations    Sulfa (sulfonamide antibiotics) Nausea Only and Rash       Past Medical History:   Diagnosis Date    Anxiety 12/18/2012    Back pain 12/18/2012    Cataract     Chronic pain syndrome 04/24/2016    Diabetes type 2, controlled 02/20/2016    Diabetic retinopathy     DM (diabetes mellitus) 12/18/2012    DM (diabetes mellitus), type 2, uncontrolled 11/16/2013    Gastroesophageal reflux disease without esophagitis 02/20/2016    Hyperlipidemia 12/18/2012    Insomnia 08/07/2014    Neuropathy 11/16/2013     Past Surgical History:   Procedure Laterality Date    APPLICATION OF WOUND VACUUM-ASSISTED CLOSURE DEVICE Right 9/6/2024    Procedure: APPLICATION, WOUND VAC;  Surgeon: Moe Liz MD;  Location: [facility] OR [address] [place] [address];  Service: Orthopedics;  Laterality: Right;    APPLICATION, EXTERNAL FIXATION DEVICE, FOR ANKLE FRACTURE Right 7/18/2024    Procedure: APPLICATION, EXTERNAL FIXATION DEVICE, FOR ANKLE FRACTURE;  Surgeon: Moe Liz MD;  Location: [facility] OR [address] [place] [address];  Service: Orthopedics;  Laterality: Right;    ARTHROTOMY OF ANKLE Right 9/9/2024    Procedure: ARTHROTOMY, ANKLE;  Surgeon: Moe Liz MD;  Location: [facility] OR [address] [place] [address];  Service: Orthopedics;  Laterality: Right;    BACK SURGERY  2003    Lumbar Spine    CATARACT EXTRACTION      CLOSED REDUCTION, FRACTURE, ANKLE, TRIMALLEOLAR Right 7/18/2024    Procedure: CLOSED REDUCTION, FRACTURE, ANKLE, TRIMALLEOLAR;  Surgeon: Moe Liz MD;  Location: [facility] OR [address] [place] [address];  Service: Orthopedics;  Laterality: Right;    EPIDURAL STEROID INJECTION N/A 1/16/2019    Procedure: Injection, Steroid, Epidural  Cervical;  Surgeon: Andrew Sinclair Jr., MD;  Location: [facility] ENDO;  Service: Pain Management;  Laterality: N/A;  Cervical Epidural Steroid Injection C7-T1    84583    Arrive @ 1240    EPIDURAL STEROID INJECTION N/A 2/20/2019    Procedure: Injection, Steroid, Epidural;  Surgeon: Andrew Sinclair Jr., MD;  Location: [facility] ENDO;  Service: Pain Management;  Laterality: N/A;  Lumbar Epidural Steroid Injection L4-5    91714    Arrive @ 1215 (requests latest time)    FIXATION OF SYNDESMOSIS OF ANKLE Right 7/26/2024    Procedure: FIXATION, SYNDESMOSIS, ANKLE;  Surgeon: Moe Liz MD;  Location: [facility] OR 2ND FLR;  Service: Orthopedics;  Laterality: Right;    INJECTION, SPINE, LUMBOSACRAL, TRANSFORAMINAL APPROACH Bilateral 6/14/2024    Procedure: Bilateral L5 Transforaminal Epidural Steroid Injections;  Surgeon: Andrew Sinclair Jr., MD;  Location: [facility] PAIN MANAGEMENT;  Service: Pain Management;  Laterality: Bilateral;  @1300(given)  ASA last 6/8  Check BG  MD Sign.    IRRIGATION AND DEBRIDEMENT Right 9/6/2024    Procedure: IRRIGATION AND DEBRIDEMENT;  Surgeon: Moe Liz MD;  Location: [facility] OR 2ND FLR;  Service: Orthopedics;  Laterality: Right;    IRRIGATION AND DEBRIDEMENT OF LOWER EXTREMITY Right 9/9/2024    Procedure: IRRIGATION AND DEBRIDEMENT, LOWER EXTREMITY - WITH WOUND VAC CHANGE, RIGHT ANKLE;  Surgeon: Moe Liz MD;  Location: [facility] OR 2ND FLR;  Service: Orthopedics;  Laterality: Right;  WITH WOUND VAC CHANGE, RIGHT ANKLE    OPEN REDUCTION AND INTERNAL FIXATION (ORIF) OF FRACTURE OF DISTAL RADIUS Left 7/19/2024    Procedure: ORIF, FRACTURE, RADIUS, DISTAL - LEFT;  Surgeon: Moe Liz MD;  Location: [facility] OR 2ND FLR;  Service: Orthopedics;  Laterality: Left;  LEFT, SYNTHES, C ARM    OPEN REDUCTION AND INTERNAL FIXATION (ORIF) OF INJURY OF ANKLE Right 7/26/2024    Procedure: ORIF, ANKLE;  Surgeon: Moe Liz MD;  Location: [facility] OR 2ND FLR;  Service: Orthopedics;   Laterality: Right;    REMOVAL OF EXTERNAL FIXATION DEVICE Right 7/26/2024    Procedure: REMOVAL, EXTERNAL FIXATION DEVICE;  Surgeon: Moe Liz MD;  Location: [facility] OR [address] [place] [address];  Service: Orthopedics;  Laterality: Right;     Family History       Problem Relation (Age of Onset)    Alzheimer's disease Father    Cataracts Father    Heart attack Mother    Hypertension Father, Mother    Migraines Mother    Parkinsonism Father          Tobacco Use    Smoking status: Never    Smokeless tobacco: Never   Substance and Sexual Activity    Alcohol use: Yes     Alcohol/week: 1.0 standard drink of alcohol     Types: 1 Glasses of wine per week     Comment: occasionally    Drug use: No    Sexual activity: Not Currently     Partners: Male     Birth control/protection: Condom     Comment: 10/2/17      Review of Systems   Constitutional:  Negative for activity change and appetite change.   Respiratory:  Negative for chest tightness.    Cardiovascular:  Negative for chest pain.   Skin:  Positive for wound. Negative for color change.   Neurological:  Negative for weakness and numbness.     Objective:     Vital Signs (Most Recent):  Temp: 100.1 °F (37.8 °C) (09/12/24 0734)  Pulse: 110 (09/12/24 0734)  Resp: 20 (09/12/24 0734)  BP: 132/84 (09/12/24 0734)  SpO2: 99 % (09/12/24 0734) Vital Signs (24h Range):  Temp:  [98.1 °F (36.7 °C)-100.1 °F (37.8 °C)] 100.1 °F (37.8 °C)  Pulse:  [100-118] 110  Resp:  [16-22] 20  SpO2:  [94 %-99 %] 99 %  BP: (127-174)/(61-84) 132/84     Weight: 93 kg (205 lb 0.4 oz)  Body mass index is 34.12 kg/m².      Physical Exam  Eyes:      Pupils: Pupils are equal, round, and reactive to light.   Cardiovascular:      Rate and Rhythm: Normal rate.      Comments: Unable to assess right lower extremity signals given presence of cast.  Pulmonary:      Effort: Pulmonary effort is normal.   Abdominal:      General: Abdomen is flat.   Musculoskeletal:      Comments: RLE in cast.    Skin:     General: Skin is  warm.   Neurological:      Mental Status: He is alert.          Significant Labs:  All pertinent labs from the last 24 hours have been reviewed.    Significant Diagnostics:  I have reviewed all pertinent imaging results/findings within the past 24 hours.  RLE CTA reviewed, patent SFA, popliteal, tibial disease present.   Assessment/Plan:     PAD (peripheral artery disease)  64-year-old gentleman presenting with nonhealing wound of the right lower extremity.    - Will obtain toe pressures.  - Plan for right lower extremity angiogram today.  - Please keep NPO.        Thank you for your consult.     Johan Odom MD  Vascular Surgery  [facility]

## 2024-09-12 NOTE — ASSESSMENT & PLAN NOTE
Cortes Schroeder is a 64 y.o. male with PMH of DM, HTN  and right trimalleolar ankle fracture status post ex fix on 07/18 with subsequent definitive fixation on 07/26 admitted with right ankle wound dehiscence in the setting of uncontrolled diabetes.      He is s/p I&D of R ankle 09/06. Repeat I&D R medial ankle 09/09.      VS: tacky 110s ON.   Labs: Worsening DEEPALI   Diet: NPO  Pain control: multimodal regimen  PT/OT:  NWB RLE   DVT PPx: Lovenox 40  Abx:  oxacillin continuous; ID following   Cultures:  ankle cx staph +      Dispo: appreciate plastics and vascular recs, possible angiogram today

## 2024-09-12 NOTE — ASSESSMENT & PLAN NOTE
Persistent non-productive cough.  CTA chest negative for PE. Showed small RUL GGO. Procal 2.6 (but in setting of worsening DEEPALI, may be falsely elevated?)  O2 sats 94-99% on 3 liters nasal cannula.  Currently on oral doxycycline     Plan:   Recommend CT C/A/P without contrast   Repeat procal ordered for am   Continue doxycycline for now.

## 2024-09-12 NOTE — PROGRESS NOTES
[facility] Hospital Medicine  Progress Note    Patient Name: Cortes Schroeder  MRN: [id number]  Patient Class: IP- Inpatient   Admission Date: 9/6/2024  Length of Stay: 6 days  Attending Physician: Maria Del Rosario Medina MD  Primary Care Provider: Andrew Sinclair Jr., MD        Subjective:     Principal Problem:Surgical wound breakdown        HPI:  Cortes Schroeder is a 64 y.o. M with PMHx of anxiety, T2DM, GERD, HLD, HTN who presented to ED for AMS. He had a fall in July that resulted in R trimalleolar fracture and L distal radius fracture. He had external fixation on 07/18 and then ORIF on 07/26 with Dr. Liz. He was prescribed clinda 300 mg on 08/28 for a ten day course. Patient was doing well when he acutely became altered and not acting like himself a few hours ago. Patient family member drove him here from [facility] for ortho consult. R medial ankle wound dehisced with redness and swelling around it. No CPM fever, cough, N/V/D or seizure.    In ED: T max 99.1. SIRS 2/4 with WBC 18 and . CMP notable for Na 127, Cr 1.7, . Phos 1.9. .3. BNP 73. Trop neg. A1c 8.5. R tib fib XR notes There are 2 abandoned anterior-posteriorly oriented pin tracks in the right mid tibial shaft.  On AP views, each of these pin tracks demonstrates a small faint internal density, possibly representing a sequestrum. Ortho and endocrine consulted. Given IV vanc and IV clindamycin. Given 2.7L IVFs. Admitted to hospital medicine for sepsis 2/2 infected hardware.     Overview/Hospital Course:  Cortes Schroeder is a 63 yo M admitted to hospital medicine for sepsis 2/2 R ankle infection s/p ORIF on 07/26. Started on IV vanc and cefepime. Given IVFs. Brought to OR with ortho on 09/06 for irrigation debridement of RLE. Endocrine following for BG. Was on vanc and zosyn. Echo without concern for vegetations. 2/2 Blood cultures and 2/2 repeats with Staph aureus. Wound cultures with Staph aureus and Group B  strep. Will follow cultures. ID consulted as suspect patient will need long course of abx; now changing to continuous oxacillin. CTA chest negative for PE, but notes small area of ground glass changes to RUL. Patient now with cough. Adding doxy for possible PNA. Ortho repeated I&D on 09/09. WBC remains elevated, but fever has resolved. Plastics consulted for flap eval. CTA RLE with moderate atherosclerosis and multifocal high grade stenosis throughout R calf arteries. Vascular surgery consulted per plastics recs as they would like to pre-optimiize blood flow prior to any free flap or pedicled propellar flap. RD consulted for malnutrition. Now with DEEPALI, but likely 2/2 infection and multiple images requiring contrast. Will need PT/OT consult prior to discharge.     Interval History: known to myself from his first admit in July, in interim now with infected right ankle surgical wound with MSSA bacteremia on admit with ID following with wound Cx with staph and group B strep with CTA showing high grade stenosis in RLE, with THEO today with possible angiogram today as possible plastics flap consideration and vascular work up in process prior to consideration of this for ortho/plastic surgery considation further. Wound vac in place with darker red output, NWB to RLE. On oxygen, he reports minimal dyspnea but at times has mild dyspnea when talking. Hypoglycemiac this AM and he reports was given glucose tabs. Endo managing insulin regimen as previous admit and at home on insulin pump but unabel to do at this admit at this time. Has had transaminiits that is mild but fluctuating with mild improvement in AST elevation today at 96. Prev notes say were holding hiv ppx but was still on MAR so stopped. Dec tylenol PRNS to 500 from 1 gram but had not been receiving. ID monitoring antibitoic regimens with these small elevations. Repeat procal of 2 as wbc still higher at 22 and on doxy for resp coverage as possible infiltrate on CTA  while PE ruled out. Hg 8.5. cr 2.6 and higher than baseline. Last admit had issues of hyponatremia and volume depletion as cause, so will start light IVF and check urine studies as well as Na studies. Sinus tachy as well. He says he is drinking adequately but unclear as has had some confusion at times. Is not completely atb aseline based on previous admission. Roommate hugo was here last admit who I met. He had a few statements he made that were slightly off to situation but was able to give most of history accurately to me but not fully himself overall from previous.     Review of Systems   Constitutional:  Positive for activity change and fatigue. Negative for chills and fever.   Respiratory:  Positive for cough. Negative for chest tightness and shortness of breath.    Cardiovascular:  Negative for chest pain and leg swelling.   Gastrointestinal:  Positive for nausea. Negative for abdominal pain.   Musculoskeletal:  Positive for arthralgias.   Skin:  Positive for color change and wound.   Neurological:  Negative for dizziness and weakness.     Objective:     Vital Signs (Most Recent):  Temp: 100.1 °F (37.8 °C) (09/12/24 0734)  Pulse: 110 (09/12/24 0734)  Resp: 20 (09/12/24 0734)  BP: 132/84 (09/12/24 0734)  SpO2: 99 % (09/12/24 0734) Vital Signs (24h Range):  Temp:  [98.1 °F (36.7 °C)-100.1 °F (37.8 °C)] 100.1 °F (37.8 °C)  Pulse:  [100-118] 110  Resp:  [16-22] 20  SpO2:  [94 %-99 %] 99 %  BP: (127-174)/(61-84) 132/84     Weight: 93 kg (205 lb 0.4 oz)  Body mass index is 34.12 kg/m².    Intake/Output Summary (Last 24 hours) at 9/12/2024 1000  Last data filed at 9/12/2024 0734  Gross per 24 hour   Intake 342 ml   Output 1550 ml   Net -1208 ml         Physical Exam  Vitals and nursing note reviewed.   Constitutional:       Appearance: He is well-developed. He is obese. He is ill-appearing.      Comments: Not fully at baseline. On oxygen   Eyes:      Pupils: Pupils are equal, round, and reactive to light.    Cardiovascular:      Rate and Rhythm: Regular rhythm. Tachycardia present.   Pulmonary:      Effort: Pulmonary effort is normal.      Breath sounds: Rales (bibasilar) present.      Comments: On 2L NC.  Abdominal:      Palpations: Abdomen is soft.      Tenderness: There is no abdominal tenderness.   Musculoskeletal:         General: No tenderness.      Comments: Bandages in place. Wound vac with dark red output.   Skin:     General: Skin is warm and dry.      Comments: C/d/I dressing to R ankle with wound vac.    Neurological:      Mental Status: He is alert and oriented to person, place, and time.   Psychiatric:         Behavior: Behavior normal.             Significant Labs: All pertinent labs within the past 24 hours have been reviewed.  CBC:   Recent Labs   Lab 09/11/24  1041 09/12/24  0537   WBC 21.86* 22.35*   HGB 9.0* 8.5*   HCT 27.1* 26.0*   * 520*     CMP:   Recent Labs   Lab 09/11/24  1041 09/12/24  0538   * 131*   K 3.4* 3.7    103   CO2 18* 17*   * 56*   BUN 30* 31*   CREATININE 2.1* 2.6*   CALCIUM 7.7* 7.9*   PROT 5.9* 6.5   ALBUMIN 1.3* 1.4*   BILITOT 0.4 0.4   ALKPHOS 549* 484*   * 96*   ALT 19 9*   ANIONGAP 10 11       Significant Imaging: I have reviewed all pertinent imaging results/findings within the past 24 hours.    Assessment/Plan:      * Surgical wound breakdown  Closed trimalleolar fracture of right ankle s/p ORIF in July  65 yo M presenting with AMS and worsening redness, swelling and pain to R ankle.     - continue oxacillin  - Ortho consulted:   - taken to OR 09/06 for debridement of surgical wound with wound vac placed. CX with group B strep and staph.   - Repeat I&D of R medial ankle on 09/09   - recommending plastics eval for free flap  Wound vac in place  - follow wound cultures -- Staph aureus and GBS  - follow blood cultures -- Staph aureus.   - ID following:  - Continue IV oxacillin 12 g q 24 hours continuous infusion.  Monitor liver enzymes.   -  Anticipate 6 weeks of IV antibiotics from date of most recent washout.    - Once blood cxs remain cleared for 72hrs, will consider adding adjunctive rifampin (isolate is susceptible) rifampin for biofilm penetration if hardware is to remain.  - Anticipate long term oral  antibiotic suppression following IV abx   - Will follow.    - Plastic consulted:   - CTA reviewed, given numerous areas of high grade stenosis, will need Vascular consult for possible angiogram to pre-optimiize blood flow prior to any free flap or pedicled propellar flap.  - continue optimization of BG, endocrinology on board  - ID on board, continue  IV abx. Current wbc remains elevated  - orthopedics on board to assess for source control, possible hardware removal.  - malnutition: will need optimization prior to OR. Prealbumin and transferrin low on last check.   - Plan: after medically optimized, plan for flap closure of wound.   - Vascular surgery consulted; appreciate recs . THEO today and possible angiogram today given PVD seen on CTA    Leukocytosis  Persisting, ID following, procal 2, crp repeat pending      Sinus tachycardia  Suspect from volume down, IVF      PAD (peripheral artery disease)  Workup with vascular now, THEO, high grade stenosis on CTA, possible aniogram pending      Acute blood loss anemia  Anemia is likely due to acute blood loss which was from surgery . Most recent hemoglobin and hematocrit are listed below.  Recent Labs     09/10/24  0439 09/11/24  1041 09/12/24  0537   HGB 8.4* 9.0* 8.5*   HCT 25.9* 27.1* 26.0*     Plan  - Monitor serial CBC: Daily  - Transfuse PRBC if patient becomes hemodynamically unstable, symptomatic or H/H drops below 7/21.  - Patient has not received any PRBC transfusions to date  - Patient's anemia is currently stable  -     Cough  Covering doxy for URI      Transaminitis  - liver enzymes up and down, highest ast at 155, between   Now at 96  - will hold truvada for now  - monitor with daily  "CMP    On pre-exposure prophylaxis for HIV  - holding truvada for transaminitis     MSSA bacteremia  - 2/2 blood cx with Staph aureus. 2/2 repeats positive. Repeats pending but NGTD  - Follow repeat blood cx  - ID following: start continuous oxacillin   - echo without concern for vegetations     Sepsis  This patient does have evidence of infective focus  My overall impression is sepsis.  Source: Skin and Soft Tissue (location ankle)  Antibiotics given-   Antibiotics (72h ago, onward)      Start     Stop Route Frequency Ordered    09/11/24 2100  doxycycline tablet 100 mg         -- Oral Every 12 hours 09/11/24 1223    09/09/24 1730  oxacillin 12 g in  mL CONTINUOUS INFUSION         -- IV Every 24 hours (non-standard times) 09/09/24 1630          Latest lactate reviewed-  No results for input(s): "LACTATE", "POCLAC" in the last 72 hours.    Organ dysfunction indicated by Acute kidney injury and Encephalopathy    Fluid challenge Actual Body weight- Patient will receive 30ml/kg actual body weight to calculate fluid bolus for treatment of septic shock.     Post- resuscitation assessment No - Post resuscitation assessment not needed     Will Not start Pressors-   Source control achieved by: see above  -secondary to surgical wound infeection in right ankle, with 9/6 wound Cx with group B strep and staph with bacteremia with 9/7 blood with staph with 9/8 and 9/10 blood Cx NGTD  ID following. Echo without signs of vegtations. On oxacillin now. Wbc still higher at 22, and ID following, procal 2 on 9/12, and repeat CRP pending. Will f/u ID recs further.    Hyponatremia  Hyponatremia is likely due to Dehydration/hypovolemia. The patient's most recent sodium results are listed below.  Recent Labs     09/10/24  0439 09/11/24  1041 09/12/24  0538   * 132* 131*       Plan  - Correct the sodium by 4-6mEq in 24 hours.   - Obtain the following studies: Urine sodium, urine osmolality, serum osmolality.  - Will treat the " hyponatremia with IV fluids as follows: NS  - Monitor sodium Daily.   - Patient hyponatremia is improving  Last admit had similar low and improved with volume    Uncontrolled diabetes mellitus with hyperglycemia  Patient's FSGs are not controlled on current hypoglycemics.   Last A1c reviewed-   Lab Results   Component Value Date    LABA1C 10.8 (H) 08/15/2016    HGBA1C 8.5 (H) 09/06/2024     Most recent fingerstick glucose reviewed-   Recent Labs   Lab 09/11/24  2119 09/11/24  2316 09/12/24  0727 09/12/24  0843   POCTGLUCOSE 68* 89 66* 96       Current correctional scale  Low  Maintain anti-hyperglycemic dose as follows-   Antihyperglycemics (From admission, onward)      Start     Stop Route Frequency Ordered    09/13/24 0900  insulin glargine U-100 (Lantus) pen 35 Units         -- SubQ Daily 09/12/24 0845    09/12/24 0715  insulin aspart U-100 pen 9 Units         -- SubQ 3 times daily with meals 09/11/24 1649    09/11/24 0959  insulin aspart U-100 pen 0-10 Units         -- SubQ Before meals & nightly PRN 09/11/24 0859           - Endocrine consulted:  - At this time, recommend that the patient remain off of his pump since he cannot be controlled in the Auto mode. Patient does not interact with pump frequently and relies on the auto mode algorithm.   - Lantus (Insulin Glargine) 35   - Novolog (Insulin Aspart) 9 units TIDWM (20% decrease given post-prandial BG below goal) (Hold if NPO) and prn for BG excursions MDC SSI (150/25)   - hold oral antihyperglycemics during hospitalization   - needs better BG control for optimal wound healing   Doses decreased on 9/12 for hypoglycemia this AM with endo managing    Closed trimalleolar fracture of right ankle  S/p ORIF 07/2024  Ortho consulted   - see surgical wound breakdown above  NWB    DEEPALI (acute kidney injury)  DEEPALI is likely due to pre-renal azotemia due to dehydration. Baseline creatinine is  1 . Most recent creatinine and eGFR are listed below.  Recent Labs      09/10/24  0439 09/11/24  1041 09/12/24  0538   CREATININE 1.6* 2.1* 2.6*   EGFRNORACEVR 48.1* 34.7* 26.9*        Plan  - DEEPALI is worsening. Will adjust treatment as follows: give additional fluids   - Avoid nephrotoxins and renally dose meds for GFR listed above  - Monitor urine output, serial BMP, and adjust therapy as needed  - baseline 1.6--2.1--2.6 now. EF on prev echo 70% so signs of volume depletion, as well as hyponatremia and tachycardia. Resume IVF 9/12 and urine and Na studies.    Acute hypoxemic respiratory failure  Patient with Hypoxic Respiratory failure which is Acute.  he is not on home oxygen. Supplemental oxygen was provided and noted-      .   Signs/symptoms of respiratory failure include- tachypnea and lethargy. Contributing diagnoses includes - Obesity Hypoventilation and Pneumonia Labs and images were reviewed. Patient Has not had a recent ABG. Will treat underlying causes and adjust management of respiratory failure as follows-     - currently on 2L NC, will wean O2 as tolerated  - repeat CXR without acute process  - CTA ordered by ortho/anesthesia prior to I&D. Negative for PE, but small ground glass area to RUL representing infectious vs. Inflammatory process.  - doxy added for pneumonia coverage.  - mucinex BID, acapella, and nebulized saline . Has IS at bedside on exam 9/12  - prn duo nebs   9/8: no signs of overload. Left Ventricle: The left ventricle is normal in size. Normal wall thickness. There is normal systolic function with a visually estimated ejection fraction of 65 - 70%. There is normal diastolic function.    Right Ventricle: Normal right ventricular cavity size. Wall thickness is normal. Systolic function is normal.    IVC/SVC: Normal venous pressure at 3 mmHg.    No evidence of intracardiac mass or vegetation.    Metabolic acidosis  Likely secondary to DEEPALI  Start Na bicarb on 9/12      Uncontrolled type 2 diabetes mellitus with diabetic polyneuropathy, with long-term current  "use of insulin  Patient's FSGs are uncontrolled due to hyperglycemia on current medication regimen.  Last A1c reviewed-   Lab Results   Component Value Date    LABA1C 10.8 (H) 08/15/2016    HGBA1C 9.9 (H) 07/18/2024     Most recent fingerstick glucose reviewed- No results for input(s): "POCTGLUCOSE" in the last 24 hours.  Current correctional scale  Low  Maintain anti-hyperglycemic dose as follows-   Antihyperglycemics (From admission, onward)      Start     Stop Route Frequency Ordered    09/06/24 0506  insulin aspart U-100 pen 0-5 Units         -- SubQ Every 6 hours PRN 09/06/24 0406          Hold Oral hypoglycemics while patient is in the hospital.      VTE Risk Mitigation (From admission, onward)           Ordered     enoxaparin injection 40 mg  Daily         09/10/24 0601     IP VTE HIGH RISK PATIENT  Once         09/06/24 1735     Place sequential compression device  Until discontinued         09/06/24 1249                    Discharge Planning   JAYLEEN: 9/16/2024     Code Status: Full Code   Is the patient medically ready for discharge?:     Reason for patient still in hospital (select all that apply): Patient trending condition  Discharge Plan A: Home Health                  Maria Del Rosario Medina MD  Department of Hospital Medicine   [facility] - Surgery    "

## 2024-09-12 NOTE — ASSESSMENT & PLAN NOTE
Closed trimalleolar fracture of right ankle s/p ORIF in July  65 yo M presenting with AMS and worsening redness, swelling and pain to R ankle.     - continue oxacillin  - Ortho consulted:   - taken to OR 09/06 for debridement of surgical wound with wound vac placed. CX with group B strep and staph.   - Repeat I&D of R medial ankle on 09/09   - recommending plastics eval for free flap  Wound vac in place  - follow wound cultures -- Staph aureus and GBS  - follow blood cultures -- Staph aureus.   - ID following:  - Continue IV oxacillin 12 g q 24 hours continuous infusion.  Monitor liver enzymes.   - Anticipate 6 weeks of IV antibiotics from date of most recent washout.    - Once blood cxs remain cleared for 72hrs, will consider adding adjunctive rifampin (isolate is susceptible) rifampin for biofilm penetration if hardware is to remain.  - Anticipate long term oral  antibiotic suppression following IV abx   - Will follow.    - Plastic consulted:   - CTA reviewed, given numerous areas of high grade stenosis, will need Vascular consult for possible angiogram to pre-optimiize blood flow prior to any free flap or pedicled propellar flap.  - continue optimization of BG, endocrinology on board  - ID on board, continue  IV abx. Current wbc remains elevated  - orthopedics on board to assess for source control, possible hardware removal.  - malnutition: will need optimization prior to OR. Prealbumin and transferrin low on last check.   - Plan: after medically optimized, plan for flap closure of wound.   - Vascular surgery consulted; appreciate recs . THEO today and possible angiogram today given PVD seen on CTA

## 2024-09-12 NOTE — PROGRESS NOTES
.Plastic and Reconstructive Surgery   Progress Note    Subjective:    Pending [facility] cx for more arterial pre-optimization    Objective:  Vital signs in last 24 hours:  Temp:  [98.1 °F (36.7 °C)-100.1 °F (37.8 °C)] 100.1 °F (37.8 °C)  Pulse:  [100-118] 110  Resp:  [16-22] 20  SpO2:  [94 %-99 %] 99 %  BP: (127-174)/(61-84) 132/84    Intake/Output last 3 shifts:  I/O last 3 completed shifts:  In: 462 [P.O.:462]  Out: 1550 [Urine:1550]    Intake/Output this shift:  I/O this shift:  In: -   Out: 200 [Urine:200]        Physical Exam:  VITAL SIGNS:   Vitals:    24 0415 24 0517 24 0708 24 0734   BP:  127/61  132/84   BP Location:  Right arm  Left arm   Patient Position:  Lying  Sitting   Pulse: 109 108 (!) 111 110   Resp:  18 (!) 22 20   Temp:  99.6 °F (37.6 °C)  100.1 °F (37.8 °C)   TempSrc:  Oral  Axillary   SpO2:  (!) 94% 99% 99%   Weight:       Height:         TMAX: Temp (24hrs), Av °F (37.2 °C), Min:98.1 °F (36.7 °C), Max:100.1 °F (37.8 °C)      General: Alert; No acute distress  Cardiovascular: Regular rate   Respiratory: Normal respiratory effort. Chest rise symmetric.   Abdomen: Soft, nontender, nondistended  Extremity:  RLE in posterior slab splint. Softly palpable Pop. Unable to assess Rt dp/pt. Cap refill on distal toes 2-3 secs, somewhat cool to touch.   Neurologic: No focal deficit. Speech normal      Scheduled Medications atorvastatin, 40 mg, Daily  doxycycline, 100 mg, Q12H  enoxaparin, 40 mg, Daily  gabapentin, 600 mg, Daily   And  gabapentin, 1,200 mg, QHS  guaiFENesin, 600 mg, BID  insulin aspart U-100, 9 Units, TIDWM  [START ON 2024] insulin glargine U-100, 35 Units, Daily  morphine, 2 mg, Once  nortriptyline, 50 mg, Nightly  oxacillin 12 g in  mL CONTINUOUS INFUSION, 12 g, Q24H  polyethylene glycol, 17 g, BID  senna, 8.6 mg, BID  sodium bicarbonate, 650 mg, TID        PRN Medications     Current Facility-Administered Medications:     acetaminophen, 500 mg, Oral, Q8H  PRN    albuterol-ipratropium, 3 mL, Nebulization, Q4H PRN    calcium carbonate, 1,000 mg, Oral, TID PRN    dextrose 10%, 12.5 g, Intravenous, PRN    dextrose 10%, 12.5 g, Intravenous, PRN    dextrose 10%, 25 g, Intravenous, PRN    dextrose 10%, 25 g, Intravenous, PRN    glucagon (human recombinant), 1 mg, Intramuscular, PRN    glucose, 16 g, Oral, PRN    glucose, 24 g, Oral, PRN    insulin aspart U-100, 0-10 Units, Subcutaneous, QID (AC + HS) PRN    morphine, 2 mg, Intravenous, Q6H PRN    morphine, 4 mg, Intravenous, Q6H PRN    ondansetron, 8 mg, Oral, Q8H PRN    Recent Labs:   Lab Results   Component Value Date    WBC 22.35 (H) 09/12/2024    HGB 8.5 (L) 09/12/2024    HCT 26.0 (L) 09/12/2024    MCV 86 09/12/2024     (H) 09/12/2024     Lab Results   Component Value Date    GLU 56 (L) 09/12/2024     (L) 09/12/2024    K 3.7 09/12/2024     09/12/2024    BUN 31 (H) 09/12/2024         Assessment:   64 y.o. y/o male s/p Procedure(s):  IRRIGATION AND DEBRIDEMENT, LOWER EXTREMITY - WITH WOUND VAC CHANGE, RIGHT ANKLE  ARTHROTOMY, ANKLE      3 Days Post-Op     64 y.o.male W/ right ankle post-surgical medial wound dehiscence and exposed hardware.     Plan    - continue optimization of BG, endocrinology on board  - ID on board, continue  IV abx. Current wbc remains elevated  - orthopedics on board to assess for source control, possible hardware removal.  - malnutition: will need optimization prior to OR. Prealbumin and transferrin low on last check.   - Vascular cx for preoperative optimization of arterial flow  - Diet: diabetic per Endocrine  - Abx: IV abx per ID  - DVT ppx, IS, OOB, ambulate, PT/OT    Pending Vascular angiogram tomoorow  Patient was discussed with Attending Plastic Surgeon, Dr. Rom Silva MD- Fellow  Department of Plastic and Reconstructive Surgery

## 2024-09-12 NOTE — PROGRESS NOTES
[facility]  Endocrinology  Progress Note    Admit Date: 9/6/2024     Reason for Consult: Management of T2DM, Hyperglycemia      Surgical Procedure and Date: S/P irrigation debridement of RLE on 09/06/2024    Diabetes diagnosis year: 1995     Home Diabetes Medications:    Humalog via OmniPod insulin pump  Mounjaro 10 mg weekly     Current pump settings:  Basal 12 am to 2.3 and 6 am to 1.55  ICR to 3 at 12 am, 2 at 4 pm  ISF to 1:20   AIT 3 hrs  Target 110-120        Patient had anaphylaxis reaction to SGLT2 inhibitors specifically Invokana     How often checking glucose at home? Dexcom G6   BG readings on regimen: Average 210's per Dexcom  Hypoglycemia on the regimen?  Yes  Missed doses on regimen?  No     Diabetes Complications include:     Hyperglycemia and Diabetic peripheral neuropathy         Complicating diabetes co morbidities:   HLD, HTN, Obesity         HPI: 64 y.o. male presents to the ED w/ complaint of altered mental status. Hx of R ankle fracture with surgery over a month ago. Patient now presents with sepsis 2/2 R ankle infection s/p ORIF on 07/26. Started on IV vanc and cefepime. Given IVFs. Blood cultures pending. Brought to OR with ortho on 09/06 for irrigation debridement of RLE. Endocrine following for BG and type 2 diabetes.     Lab Results   Component Value Date    LABA1C 10.8 (H) 08/15/2016    HGBA1C 8.5 (H) 09/06/2024         Interval HPI:   No acute events overnight. Patient with confusion and AMS since this AM when he went for angiogram. Patient in room 542/542 A. Blood glucose stable. BG below goal on current insulin regimen (SSI, prandial, and basal insulin ). Steroid use- None.   3 Days Post-Op  Renal function-   Lab Results   Component Value Date    CREATININE 2.6 (H) 09/12/2024        Vasopressors-  None     NPO    Eating:   NPO  Nausea: No  Hypoglycemia and intervention: Yes- pre-bedtime and overnight fasting BG 50s-60s. Will reduce scheduled insulin doses. Possibly related to  "decline in renal function.   Fever: No  TPN and/or TF: No    /84 (BP Location: Left arm, Patient Position: Sitting)   Pulse 110   Temp 100.1 °F (37.8 °C) (Axillary)   Resp 20   Ht 5' 5" (1.651 m)   Wt 93 kg (205 lb 0.4 oz)   SpO2 99%   BMI 34.12 kg/m²     Labs Reviewed and Include    Recent Labs   Lab 09/12/24  0538   GLU 56*   CALCIUM 7.9*   ALBUMIN 1.4*   PROT 6.5   *   K 3.7   CO2 17*      BUN 31*   CREATININE 2.6*   ALKPHOS 484*   ALT 9*   AST 96*   BILITOT 0.4     Lab Results   Component Value Date    WBC 22.35 (H) 09/12/2024    HGB 8.5 (L) 09/12/2024    HCT 26.0 (L) 09/12/2024    MCV 86 09/12/2024     (H) 09/12/2024     No results for input(s): "TSH", "FREET4" in the last 168 hours.  Lab Results   Component Value Date    HGBA1C 8.5 (H) 09/06/2024       Nutritional status:   Body mass index is 34.12 kg/m².  Lab Results   Component Value Date    ALBUMIN 1.4 (L) 09/12/2024    ALBUMIN 1.3 (L) 09/11/2024    ALBUMIN 1.3 (L) 09/10/2024     Lab Results   Component Value Date    PREALBUMIN 3 (L) 09/06/2024    PREALBUMIN 13 (L) 07/18/2024       Estimated Creatinine Clearance: 30.5 mL/min (A) (based on SCr of 2.6 mg/dL (H)).    Accu-Checks  Recent Labs     09/10/24  0726 09/10/24  1127 09/10/24  1558 09/10/24  2134 09/11/24  0746 09/11/24  1114 09/11/24  1543 09/11/24  2119 09/11/24  2316 09/12/24  0727   POCTGLUCOSE 212* 254* 153* 205* 139* 107 90 68* 89 66*       Current Medications and/or Treatments Impacting Glycemic Control  Immunotherapy:    Immunosuppressants       None          Steroids:   Hormones (From admission, onward)      None          Pressors:    Autonomic Drugs (From admission, onward)      None          Hyperglycemia/Diabetes Medications:   Antihyperglycemics (From admission, onward)      Start     Stop Route Frequency Ordered    09/12/24 0900  insulin glargine U-100 (Lantus) pen 35 Units         -- SubQ Daily 09/12/24 0713    09/12/24 0715  insulin aspart U-100 pen 9 " Units         -- SubQ 3 times daily with meals 09/11/24 1649    09/11/24 0959  insulin aspart U-100 pen 0-10 Units         -- SubQ Before meals & nightly PRN 09/11/24 0859            ASSESSMENT and PLAN    Renal/  DEEPALI (acute kidney injury)  Titrate insulin slowly to avoid hypoglycemia as the risk of hypoglycemia increases with decreased creatinine clearance.  Estimated Creatinine Clearance: 30.5 mL/min (A) (based on SCr of 2.6 mg/dL (H)).        Endocrine  Uncontrolled diabetes mellitus with hyperglycemia  Endocrinology consulted for BG management.   BG goal 140-180    - Lantus (Insulin Glargine 35 units nightly  (30% decrease given fasting hypoglycemia)   - Novolog (Insulin Aspart) 8 units TIDWM (re-weight base dose at 0.5 u/kg/day) (Hold if NPO or BG < 100) and prn for BG excursions MDC SSI (150/25)   - BG checks AC/HS  - Hypoglycemia protocol in place    ** Please notify Endocrine for any change and/or advance in diet**  ** Please call Endocrine for any BG related issues **    Discharge Planning:   TBD. Please notify endocrinology prior to discharge.        Orthopedic  * Surgical wound breakdown  Optimize BG control to improve wound healing            Payton Vora PA-C  Endocrinology  [facility]

## 2024-09-13 PROBLEM — K59.00 CONSTIPATION: Status: ACTIVE | Noted: 2024-09-13

## 2024-09-13 PROBLEM — R13.10 DYSPHAGIA: Status: ACTIVE | Noted: 2024-09-13

## 2024-09-13 PROBLEM — R82.71 BACTERIURIA: Status: ACTIVE | Noted: 2024-09-13

## 2024-09-13 PROBLEM — J39.8 AIRWAY MALACIA: Status: ACTIVE | Noted: 2024-09-13

## 2024-09-13 LAB
ALBUMIN SERPL BCP-MCNC: 1.2 G/DL (ref 3.5–5.2)
ALLENS TEST: ABNORMAL
ALP SERPL-CCNC: 494 U/L (ref 55–135)
ALT SERPL W/O P-5'-P-CCNC: 7 U/L (ref 10–44)
ANION GAP SERPL CALC-SCNC: 10 MMOL/L (ref 8–16)
AST SERPL-CCNC: 80 U/L (ref 10–40)
BACTERIA BLD CULT: NORMAL
BACTERIA BLD CULT: NORMAL
BACTERIA UR CULT: NO GROWTH
BILIRUB SERPL-MCNC: 0.4 MG/DL (ref 0.1–1)
BUN SERPL-MCNC: 37 MG/DL (ref 8–23)
CALCIUM SERPL-MCNC: 7.7 MG/DL (ref 8.7–10.5)
CHLORIDE SERPL-SCNC: 105 MMOL/L (ref 95–110)
CO2 SERPL-SCNC: 16 MMOL/L (ref 23–29)
CREAT SERPL-MCNC: 3 MG/DL (ref 0.5–1.4)
CREAT UR-MCNC: 129 MG/DL (ref 23–375)
DELSYS: ABNORMAL
ERYTHROCYTE [DISTWIDTH] IN BLOOD BY AUTOMATED COUNT: 15.7 % (ref 11.5–14.5)
EST. GFR  (NO RACE VARIABLE): 22.6 ML/MIN/1.73 M^2
GLUCOSE SERPL-MCNC: 79 MG/DL (ref 70–110)
HCT VFR BLD AUTO: 23.6 % (ref 40–54)
HGB BLD-MCNC: 7.9 G/DL (ref 14–18)
LDH SERPL L TO P-CCNC: <0.3 MMOL/L (ref 0.36–1.25)
MAGNESIUM SERPL-MCNC: 2.4 MG/DL (ref 1.6–2.6)
MCH RBC QN AUTO: 28.3 PG (ref 27–31)
MCHC RBC AUTO-ENTMCNC: 33.5 G/DL (ref 32–36)
MCV RBC AUTO: 85 FL (ref 82–98)
MODE: ABNORMAL
PLATELET # BLD AUTO: 484 K/UL (ref 150–450)
PMV BLD AUTO: 9.6 FL (ref 9.2–12.9)
POCT GLUCOSE: 107 MG/DL (ref 70–110)
POCT GLUCOSE: 137 MG/DL (ref 70–110)
POCT GLUCOSE: 198 MG/DL (ref 70–110)
POCT GLUCOSE: 228 MG/DL (ref 70–110)
POCT GLUCOSE: 88 MG/DL (ref 70–110)
POTASSIUM SERPL-SCNC: 4 MMOL/L (ref 3.5–5.1)
PROCALCITONIN SERPL IA-MCNC: 1.43 NG/ML
PROT SERPL-MCNC: 5.9 G/DL (ref 6–8.4)
PROT UR-MCNC: 178 MG/DL (ref 0–15)
RBC # BLD AUTO: 2.79 M/UL (ref 4.6–6.2)
SAMPLE: ABNORMAL
SITE: ABNORMAL
SODIUM SERPL-SCNC: 131 MMOL/L (ref 136–145)
WBC # BLD AUTO: 18.63 K/UL (ref 3.9–12.7)

## 2024-09-13 PROCEDURE — 25000003 PHARM REV CODE 250

## 2024-09-13 PROCEDURE — 92610 EVALUATE SWALLOWING FUNCTION: CPT

## 2024-09-13 PROCEDURE — 99233 SBSQ HOSP IP/OBS HIGH 50: CPT | Mod: ,,, | Performed by: NURSE PRACTITIONER

## 2024-09-13 PROCEDURE — 63600175 PHARM REV CODE 636 W HCPCS

## 2024-09-13 PROCEDURE — 99231 SBSQ HOSP IP/OBS SF/LOW 25: CPT | Mod: ,,, | Performed by: SURGERY

## 2024-09-13 PROCEDURE — 25000003 PHARM REV CODE 250: Performed by: NURSE PRACTITIONER

## 2024-09-13 PROCEDURE — 85027 COMPLETE CBC AUTOMATED: CPT

## 2024-09-13 PROCEDURE — 63600175 PHARM REV CODE 636 W HCPCS: Performed by: STUDENT IN AN ORGANIZED HEALTH CARE EDUCATION/TRAINING PROGRAM

## 2024-09-13 PROCEDURE — 99223 1ST HOSP IP/OBS HIGH 75: CPT | Mod: ,,, | Performed by: INTERNAL MEDICINE

## 2024-09-13 PROCEDURE — 25000003 PHARM REV CODE 250: Performed by: HOSPITALIST

## 2024-09-13 PROCEDURE — 25000003 PHARM REV CODE 250: Performed by: STUDENT IN AN ORGANIZED HEALTH CARE EDUCATION/TRAINING PROGRAM

## 2024-09-13 PROCEDURE — 80053 COMPREHEN METABOLIC PANEL: CPT

## 2024-09-13 PROCEDURE — 63600175 PHARM REV CODE 636 W HCPCS: Performed by: HOSPITALIST

## 2024-09-13 PROCEDURE — 84145 PROCALCITONIN (PCT): CPT | Performed by: NURSE PRACTITIONER

## 2024-09-13 PROCEDURE — 21400001 HC TELEMETRY ROOM

## 2024-09-13 PROCEDURE — 36415 COLL VENOUS BLD VENIPUNCTURE: CPT | Performed by: NURSE PRACTITIONER

## 2024-09-13 PROCEDURE — 25000003 PHARM REV CODE 250: Performed by: PHYSICIAN ASSISTANT

## 2024-09-13 PROCEDURE — 83735 ASSAY OF MAGNESIUM: CPT

## 2024-09-13 PROCEDURE — 99232 SBSQ HOSP IP/OBS MODERATE 35: CPT | Mod: ,,,

## 2024-09-13 RX ORDER — INSULIN GLARGINE 100 [IU]/ML
24 INJECTION, SOLUTION SUBCUTANEOUS NIGHTLY
Status: CANCELLED | OUTPATIENT
Start: 2024-09-13

## 2024-09-13 RX ORDER — PANTOPRAZOLE SODIUM 40 MG/1
40 TABLET, DELAYED RELEASE ORAL DAILY
Status: DISCONTINUED | OUTPATIENT
Start: 2024-09-13 | End: 2024-10-04 | Stop reason: HOSPADM

## 2024-09-13 RX ORDER — INSULIN ASPART 100 [IU]/ML
8 INJECTION, SOLUTION INTRAVENOUS; SUBCUTANEOUS
Status: CANCELLED | OUTPATIENT
Start: 2024-09-13

## 2024-09-13 RX ORDER — GUAIFENESIN 100 MG/5ML
400 SOLUTION ORAL EVERY 6 HOURS
Status: DISCONTINUED | OUTPATIENT
Start: 2024-09-13 | End: 2024-09-16

## 2024-09-13 RX ORDER — INSULIN GLARGINE 100 [IU]/ML
18 INJECTION, SOLUTION SUBCUTANEOUS NIGHTLY
Status: DISCONTINUED | OUTPATIENT
Start: 2024-09-13 | End: 2024-09-16

## 2024-09-13 RX ORDER — BISACODYL 10 MG/1
10 SUPPOSITORY RECTAL ONCE
Status: COMPLETED | OUTPATIENT
Start: 2024-09-13 | End: 2024-09-13

## 2024-09-13 RX ORDER — ENOXAPARIN SODIUM 100 MG/ML
30 INJECTION SUBCUTANEOUS EVERY 24 HOURS
Status: DISCONTINUED | OUTPATIENT
Start: 2024-09-13 | End: 2024-09-18

## 2024-09-13 RX ORDER — INSULIN ASPART 100 [IU]/ML
0-10 INJECTION, SOLUTION INTRAVENOUS; SUBCUTANEOUS EVERY 4 HOURS PRN
Status: DISCONTINUED | OUTPATIENT
Start: 2024-09-13 | End: 2024-09-20

## 2024-09-13 RX ADMIN — ENOXAPARIN SODIUM 30 MG: 30 INJECTION SUBCUTANEOUS at 04:09

## 2024-09-13 RX ADMIN — SENNOSIDES 8.6 MG: 8.6 TABLET, FILM COATED ORAL at 08:09

## 2024-09-13 RX ADMIN — ONDANSETRON 8 MG: 8 TABLET, ORALLY DISINTEGRATING ORAL at 04:09

## 2024-09-13 RX ADMIN — NORTRIPTYLINE HYDROCHLORIDE 50 MG: 25 CAPSULE ORAL at 08:09

## 2024-09-13 RX ADMIN — PANTOPRAZOLE SODIUM 40 MG: 40 TABLET, DELAYED RELEASE ORAL at 08:09

## 2024-09-13 RX ADMIN — DOXYCYCLINE HYCLATE 100 MG: 100 TABLET, COATED ORAL at 08:09

## 2024-09-13 RX ADMIN — SODIUM BICARBONATE 650 MG TABLET 650 MG: at 08:09

## 2024-09-13 RX ADMIN — OXACILLIN 12 G: 2 INJECTION, POWDER, FOR SOLUTION INTRAMUSCULAR; INTRAVENOUS at 08:09

## 2024-09-13 RX ADMIN — BISACODYL 10 MG: 10 SUPPOSITORY RECTAL at 10:09

## 2024-09-13 RX ADMIN — MORPHINE SULFATE 2 MG: 2 INJECTION, SOLUTION INTRAMUSCULAR; INTRAVENOUS at 02:09

## 2024-09-13 RX ADMIN — SODIUM BICARBONATE 650 MG TABLET 650 MG: at 02:09

## 2024-09-13 RX ADMIN — POLYETHYLENE GLYCOL 3350 17 G: 17 POWDER, FOR SOLUTION ORAL at 08:09

## 2024-09-13 RX ADMIN — GUAIFENESIN 400 MG: 200 SOLUTION ORAL at 11:09

## 2024-09-13 RX ADMIN — INSULIN ASPART 4 UNITS: 100 INJECTION, SOLUTION INTRAVENOUS; SUBCUTANEOUS at 04:09

## 2024-09-13 RX ADMIN — GUAIFENESIN 400 MG: 200 SOLUTION ORAL at 06:09

## 2024-09-13 RX ADMIN — INSULIN GLARGINE 18 UNITS: 100 INJECTION, SOLUTION SUBCUTANEOUS at 09:09

## 2024-09-13 RX ADMIN — GUAIFENESIN 400 MG: 200 SOLUTION ORAL at 10:09

## 2024-09-13 NOTE — ASSESSMENT & PLAN NOTE
Titrate insulin slowly to avoid hypoglycemia as the risk of hypoglycemia increases with decreased creatinine clearance.  Estimated Creatinine Clearance: 26.4 mL/min (A) (based on SCr of 3 mg/dL (H)).

## 2024-09-13 NOTE — ASSESSMENT & PLAN NOTE
Patient's FSGs are not controlled on current hypoglycemics.   Last A1c reviewed-   Lab Results   Component Value Date    LABA1C 10.8 (H) 08/15/2016    HGBA1C 8.5 (H) 09/06/2024     Most recent fingerstick glucose reviewed-   Recent Labs   Lab 09/12/24  2244 09/13/24  0452 09/13/24  0722 09/13/24  1059   POCTGLUCOSE 109 88 107 137*       Current correctional scale  Low  Maintain anti-hyperglycemic dose as follows-   Antihyperglycemics (From admission, onward)      Start     Stop Route Frequency Ordered    09/13/24 2100  insulin glargine U-100 (Lantus) pen 18 Units         -- SubQ Nightly 09/13/24 0730    09/13/24 0830  insulin aspart U-100 pen 0-10 Units         -- SubQ Every 4 hours PRN 09/13/24 0731           - Endocrine consulted:  - At this time, recommend that the patient remain off of his pump since he cannot be controlled in the Auto mode. Patient does not interact with pump frequently and relies on the auto mode algorithm.   - Lantus (Insulin Glargine) 35   - Novolog (Insulin Aspart) 9 units TIDWM (20% decrease given post-prandial BG below goal) (Hold if NPO) and prn for BG excursions MDC SSI (150/25)   - hold oral antihyperglycemics during hospitalization   - needs better BG control for optimal wound healing   Doses decreased on 9/12 for hypoglycemia this AM with endo managing and hypoglycemia again 9/13 in early AM overnight prior and endo adjusting further. On bariatric clear until speech eval

## 2024-09-13 NOTE — ASSESSMENT & PLAN NOTE
Closed trimalleolar fracture of right ankle s/p ORIF in July  63 yo M presenting with AMS and worsening redness, swelling and pain to R ankle.     - continue oxacillin  - Ortho consulted:   - taken to OR 09/06 for debridement of surgical wound with wound vac placed. CX with group B strep and staph.   - Repeat I&D of R medial ankle on 09/09   - recommending plastics eval for free flap  Wound vac in place  - follow wound cultures -- Staph aureus and GBS  - follow blood cultures -- Staph aureus.   - ID following:  - Continue IV oxacillin 12 g q 24 hours continuous infusion.  Monitor liver enzymes.   - Anticipate 6 weeks of IV antibiotics from date of most recent washout.    - Once blood cxs remain cleared for 72hrs, will consider adding adjunctive rifampin (isolate is susceptible) rifampin for biofilm penetration if hardware is to remain.  - Anticipate long term oral  antibiotic suppression following IV abx   - Will follow.    - Plastic consulted:   - CTA reviewed, given numerous areas of high grade stenosis, will need Vascular consult for possible angiogram to pre-optimiize blood flow prior to any free flap or pedicled propellar flap. Angiogram on hold for DEEPALI  - continue optimization of BG, endocrinology on board  - ID on board, continue  IV abx. Current wbc remains elevated but starting to improve 9/13  - orthopedics on board to assess for source control, possible hardware removal.  - malnutition: will need optimization prior to OR. Prealbumin and transferrin low on last check.   - Plan: after medically optimized, plan for flap closure of wound.   - Vascular surgery consulted; appreciate recs . Angiogram aborted 9/12 for mental status as well as 9/13 for DEEPALI. Will replan once Cr improves.

## 2024-09-13 NOTE — PROGRESS NOTES
.Plastic and Reconstructive Surgery   Progress Note    Subjective:    Pending Vasc angio    Objective:  Vital signs in last 24 hours:  Temp:  [97.1 °F (36.2 °C)-100 °F (37.8 °C)] 97.1 °F (36.2 °C)  Pulse:  [] 102  Resp:  [18-20] 18  SpO2:  [95 %-98 %] 97 %  BP: (131-159)/(62-72) 141/66    Intake/Output last 3 shifts:  I/O last 3 completed shifts:  In: -   Out: 1380 [Urine:1380]    Intake/Output this shift:  No intake/output data recorded.        Physical Exam:  VITAL SIGNS:   Vitals:    24 1057 24 1405 24 1447 24 1528   BP: 135/62   (!) 141/66   BP Location: Left arm   Right arm   Patient Position: Sitting   Lying   Pulse: 103  103 102   Resp: 18 20  18   Temp: 98.6 °F (37 °C)   97.1 °F (36.2 °C)   TempSrc: Oral   Oral   SpO2: 95%   97%   Weight:       Height:         TMAX: Temp (24hrs), Av.8 °F (37.1 °C), Min:97.1 °F (36.2 °C), Max:100 °F (37.8 °C)      General: Alert; No acute distress  Cardiovascular: Regular rate   Respiratory: Normal respiratory effort. Chest rise symmetric.   Abdomen: Soft, nontender, nondistended  Extremity:  RLE in posterior slab splint. Softly palpable Pop. Unable to assess Rt dp/pt. Cap refill on distal toes 2-3 secs, somewhat cool to touch.   Neurologic: No focal deficit. Speech normal      Scheduled Medications doxycycline, 100 mg, Q12H  enoxaparin, 30 mg, Daily  guaiFENesin 100 mg/5 ml, 400 mg, Q6H  insulin glargine U-100, 18 Units, QHS  morphine, 2 mg, Once  nortriptyline, 50 mg, Nightly  oxacillin 12 g in  mL CONTINUOUS INFUSION, 12 g, Q24H  pantoprazole, 40 mg, Daily  polyethylene glycol, 17 g, BID  senna, 8.6 mg, BID  sodium bicarbonate, 650 mg, TID        PRN Medications     Current Facility-Administered Medications:     acetaminophen, 500 mg, Oral, Q8H PRN    albuterol-ipratropium, 3 mL, Nebulization, Q4H PRN    calcium carbonate, 1,000 mg, Oral, TID PRN    dextrose 10%, 12.5 g, Intravenous, PRN    dextrose 10%, 12.5 g, Intravenous, PRN     dextrose 10%, 25 g, Intravenous, PRN    dextrose 10%, 25 g, Intravenous, PRN    glucagon (human recombinant), 1 mg, Intramuscular, PRN    glucose, 16 g, Oral, PRN    glucose, 24 g, Oral, PRN    hydrALAZINE, 25 mg, Oral, Q8H PRN    insulin aspart U-100, 0-10 Units, Subcutaneous, Q4H PRN    morphine, 2 mg, Intravenous, Q6H PRN    morphine, 4 mg, Intravenous, Q6H PRN    ondansetron, 8 mg, Oral, Q8H PRN    Recent Labs:   Lab Results   Component Value Date    WBC 18.63 (H) 09/13/2024    HGB 7.9 (L) 09/13/2024    HCT 23.6 (L) 09/13/2024    MCV 85 09/13/2024     (H) 09/13/2024     Lab Results   Component Value Date    GLU 79 09/13/2024     (L) 09/13/2024    K 4.0 09/13/2024     09/13/2024    BUN 37 (H) 09/13/2024         Assessment:   64 y.o. y/o male s/p Procedure(s):  IRRIGATION AND DEBRIDEMENT, LOWER EXTREMITY - WITH WOUND VAC CHANGE, RIGHT ANKLE  ARTHROTOMY, ANKLE      1 Day Post-Op     64 y.o.male W/ right ankle post-surgical medial wound dehiscence and exposed hardware.     Plan    - continue optimization of BG, endocrinology on board  - ID on board, continue  IV abx. Current wbc remains elevated  - orthopedics on board to assess for source control, possible hardware removal.  - malnutition: will need optimization prior to OR. Prealbumin and transferrin low on last check.   - Vascular cx for preoperative optimization of arterial flow  - Diet: diabetic per Endocrine  - Abx: IV abx per ID  - DVT ppx, IS, OOB, ambulate, PT/OT    Pending Vascular angiogram when medically stable  Patient was discussed with Attending Plastic Surgeon, Dr. Rom Silva MD- Fellow  Department of Plastic and Reconstructive Surgery

## 2024-09-13 NOTE — CONSULTS
[facility]  Nephrology  Consult Note    Patient Name: Cortes Schroeder  MRN: [id number]  Admission Date: 9/6/2024  Hospital Length of Stay: 7 days  Attending Provider: Maria Del Rosario Medina MD   Primary Care Physician: Andrew Sinclair Jr., MD  Principal Problem:Surgical wound breakdown    Inpatient consult to Nephrology  Consult performed by: Dayami Ruiz MD  Consult ordered by: Cristal Ash NP  Reason for consult: DEEPALI        Subjective:     HPI: Mr. Schroeder is a 64yoM w/hx of T2DM, anxiety, GERD, HLD, HTN, previous syncopal episodes with recent ankle fracture and distal left radius fracture after a ground level fall s/p R ankle ex-fix (7/18) and L DR ORIF(7/19) who presented to the hospital for AMS. He was admitted to the hospital on 9/6 for sepsis secondary to surgical wound breakdown now s/p excisional debridement and irrigation (9/6) and and excisional and non-excisional debridement of tissue necrosis of the right ankle (9/9). His hospitalization has been complicated by staph aureus bacteremia and group B strep. Followed by ID and on IV Oxacillin, previously on Zosyn and Vancomycin.     BUN/Cr 37/3.0, up from his baseline Cr ~1.0. Labs are notable for WBC 18.63, H/H 7.9/23.6, , Na 131, Bicarb 16, , Serum osm 284, Urine Cr 129, Urine Osm 600, Urine Na 20, CK WNL. Nephrology was consulted for DEEPALI.     Past Medical History:   Diagnosis Date    Anxiety 12/18/2012    Back pain 12/18/2012    Cataract     Chronic pain syndrome 04/24/2016    Diabetes type 2, controlled 02/20/2016    Diabetic retinopathy     DM (diabetes mellitus) 12/18/2012    DM (diabetes mellitus), type 2, uncontrolled 11/16/2013    Gastroesophageal reflux disease without esophagitis 02/20/2016    Hyperlipidemia 12/18/2012    Insomnia 08/07/2014    Neuropathy 11/16/2013       Past Surgical History:   Procedure Laterality Date    APPLICATION OF WOUND VACUUM-ASSISTED CLOSURE DEVICE Right 9/6/2024    Procedure:  APPLICATION, WOUND VAC;  Surgeon: Moe Liz MD;  Location: [facility] OR [address] [place] [address];  Service: Orthopedics;  Laterality: Right;    APPLICATION, EXTERNAL FIXATION DEVICE, FOR ANKLE FRACTURE Right 7/18/2024    Procedure: APPLICATION, EXTERNAL FIXATION DEVICE, FOR ANKLE FRACTURE;  Surgeon: Moe Liz MD;  Location: [facility] OR [address] [place] [address];  Service: Orthopedics;  Laterality: Right;    ARTHROTOMY OF ANKLE Right 9/9/2024    Procedure: ARTHROTOMY, ANKLE;  Surgeon: Moe Liz MD;  Location: [facility] OR [address] [place] [address];  Service: Orthopedics;  Laterality: Right;    BACK SURGERY  2003    Lumbar Spine    CATARACT EXTRACTION      CLOSED REDUCTION, FRACTURE, ANKLE, TRIMALLEOLAR Right 7/18/2024    Procedure: CLOSED REDUCTION, FRACTURE, ANKLE, TRIMALLEOLAR;  Surgeon: Moe Liz MD;  Location: [address];  Service: Orthopedics;  Laterality: Right;    EPIDURAL STEROID INJECTION N/A 1/16/2019    Procedure: Injection, Steroid, Epidural Cervical;  Surgeon: Andrew Sinclair Jr., MD;  Location: [facility] ENDO;  Service: Pain Management;  Laterality: N/A;  Cervical Epidural Steroid Injection C7-T1    04779    Arrive @ 1240    EPIDURAL STEROID INJECTION N/A 2/20/2019    Procedure: Injection, Steroid, Epidural;  Surgeon: Andrew Sinclair Jr., MD;  Location: [facility] ENDO;  Service: Pain Management;  Laterality: N/A;  Lumbar Epidural Steroid Injection L4-5    15723    Arrive @ 1215 (requests latest time)    FIXATION OF SYNDESMOSIS OF ANKLE Right 7/26/2024    Procedure: FIXATION, SYNDESMOSIS, ANKLE;  Surgeon: Moe Liz MD;  Location: [address];  Service: Orthopedics;  Laterality: Right;    INJECTION, SPINE, LUMBOSACRAL, TRANSFORAMINAL APPROACH Bilateral 6/14/2024    Procedure: Bilateral L5 Transforaminal Epidural Steroid Injections;  Surgeon: Andrew Sinclair Jr., MD;  Location: [facility] PAIN MANAGEMENT;  Service: Pain Management;  Laterality: Bilateral;  @1300(given)  ASA last 6/8  Check BG  MD Sign.    IRRIGATION AND  DEBRIDEMENT Right 9/6/2024    Procedure: IRRIGATION AND DEBRIDEMENT;  Surgeon: Moe Liz MD;  [facility];  Service: Orthopedics;  Laterality: Right;    IRRIGATION AND DEBRIDEMENT OF LOWER EXTREMITY Right 9/9/2024    Procedure: IRRIGATION AND DEBRIDEMENT, LOWER EXTREMITY - WITH WOUND VAC CHANGE, RIGHT ANKLE;  Surgeon: Moe Liz MD;  [facility];  Service: Orthopedics;  Laterality: Right;  WITH WOUND VAC CHANGE, RIGHT ANKLE    OPEN REDUCTION AND INTERNAL FIXATION (ORIF) OF FRACTURE OF DISTAL RADIUS Left 7/19/2024    Procedure: ORIF, FRACTURE, RADIUS, DISTAL - LEFT;  Surgeon: Moe Liz MD;  [facility];  Service: Orthopedics;  Laterality: Left;  LEFT, SYNTHES, C ARM    OPEN REDUCTION AND INTERNAL FIXATION (ORIF) OF INJURY OF ANKLE Right 7/26/2024    Procedure: ORIF, ANKLE;  Surgeon: Moe Liz MD;  [facility];  Service: Orthopedics;  Laterality: Right;    REMOVAL OF EXTERNAL FIXATION DEVICE Right 7/26/2024    Procedure: REMOVAL, EXTERNAL FIXATION DEVICE;  Surgeon: Moe Liz MD;  [facility];  Service: Orthopedics;  Laterality: Right;       Review of patient's allergies indicates:   Allergen Reactions    Invokana [canagliflozin] Anaphylaxis    Percocet [oxycodone-acetaminophen] Nausea Only and Hallucinations    Biaxin [clarithromycin]     Hydrocodone Other (See Comments)     Dizzy/nausea/hallucinations    Sulfa (sulfonamide antibiotics) Nausea Only and Rash     Current Facility-Administered Medications   Medication Frequency    acetaminophen tablet 500 mg Q8H PRN    albuterol-ipratropium 2.5 mg-0.5 mg/3 mL nebulizer solution 3 mL Q4H PRN    bisacodyL suppository 10 mg Once    calcium carbonate 200 mg calcium (500 mg) chewable tablet 1,000 mg TID PRN    dextrose 10% bolus 125 mL 125 mL PRN    dextrose 10% bolus 125 mL 125 mL PRN    dextrose 10% bolus 250 mL 250 mL PRN    dextrose 10% bolus 250 mL 250 mL PRN     doxycycline tablet 100 mg Q12H    enoxaparin injection 30 mg Daily    glucagon (human recombinant) injection 1 mg PRN    glucose chewable tablet 16 g PRN    glucose chewable tablet 24 g PRN    guaiFENesin 100 mg/5 ml syrup 400 mg Q6H    hydrALAZINE tablet 25 mg Q8H PRN    insulin aspart U-100 pen 0-10 Units Q4H PRN    insulin glargine U-100 (Lantus) pen 18 Units QHS    morphine injection 2 mg Q6H PRN    morphine injection 2 mg Once    morphine injection 4 mg Q6H PRN    nortriptyline capsule 50 mg Nightly    ondansetron disintegrating tablet 8 mg Q8H PRN    oxacillin 12 g in  mL CONTINUOUS INFUSION Q24H    pantoprazole EC tablet 40 mg Daily    polyethylene glycol packet 17 g BID    senna tablet 8.6 mg BID    sodium bicarbonate tablet 650 mg TID     Family History       Problem Relation (Age of Onset)    Alzheimer's disease Father    Cataracts Father    Heart attack Mother    Hypertension Father, Mother    Migraines Mother    Parkinsonism Father          Tobacco Use    Smoking status: Never    Smokeless tobacco: Never   Substance and Sexual Activity    Alcohol use: Yes     Alcohol/week: 1.0 standard drink of alcohol     Types: 1 Glasses of wine per week     Comment: occasionally    Drug use: No    Sexual activity: Not Currently     Partners: Male     Birth control/protection: Condom     Comment: 10/2/17      Review of Systems   Respiratory:  Negative for cough, chest tightness and shortness of breath.    Cardiovascular:  Negative for chest pain, palpitations and leg swelling.   Gastrointestinal:  Negative for abdominal pain, diarrhea, nausea and vomiting.   Musculoskeletal:  Positive for myalgias. Negative for arthralgias, back pain and joint swelling.     Objective:     Vital Signs (Most Recent):  Temp: 98.6 °F (37 °C) (09/13/24 1057)  Pulse: 103 (09/13/24 1057)  Resp: 18 (09/13/24 1057)  BP: 135/62 (09/13/24 1057)  SpO2: 95 % (09/13/24 1057) Vital Signs (24h Range):  Temp:  [98.3 °F (36.8 °C)-100 °F  (37.8 °C)] 98.6 °F (37 °C)  Pulse:  [] 103  Resp:  [18-25] 18  SpO2:  [95 %-98 %] 95 %  BP: (127-179)/(62-85) 135/62     Weight: 93 kg (205 lb 0.4 oz) (09/06/24 2032)  Body mass index is 34.12 kg/m².  Body surface area is 2.07 meters squared.    I/O last 3 completed shifts:  In: -   Out: 1380 [Urine:1380]     Physical Exam  Constitutional:       General: He is not in acute distress.     Appearance: Normal appearance. He is not ill-appearing.      Interventions: Nasal cannula in place.   HENT:      Head: Normocephalic.      Right Ear: External ear normal.      Left Ear: External ear normal.      Mouth/Throat:      Mouth: Mucous membranes are moist.      Pharynx: Oropharynx is clear.   Eyes:      General: No scleral icterus.     Extraocular Movements: Extraocular movements intact.   Cardiovascular:      Rate and Rhythm: Normal rate and regular rhythm.      Pulses: Normal pulses.      Heart sounds: Normal heart sounds. No murmur heard.     No friction rub. No gallop.   Pulmonary:      Effort: Accessory muscle usage present. No respiratory distress.      Breath sounds: No decreased air movement. Rales present.   Abdominal:      General: Abdomen is flat. Bowel sounds are normal. There is no distension.      Palpations: Abdomen is soft.      Tenderness: There is no abdominal tenderness.   Musculoskeletal:         General: No swelling.      Right lower leg: No edema.      Left lower leg: No edema.      Comments: R Distal Leg wrapped in ace bandage.    Skin:     General: Skin is warm.      Coloration: Skin is not jaundiced.      Findings: No erythema or rash.   Neurological:      General: No focal deficit present.      Mental Status: He is alert and oriented to person, place, and time. Mental status is at baseline.   Psychiatric:         Mood and Affect: Mood normal.         Behavior: Behavior normal.         Thought Content: Thought content normal.          Significant Labs:  All labs within the past 24 hours have  been reviewed.    Significant Imaging:  Labs: Reviewed  Assessment/Plan:     Renal/  DEEPALI (acute kidney injury)  Baseline Cr 1.0-1.2, Cr 1.7 on admission  DDx: Pre-renal 2/2 IVVD d/t decreased PO intake vs Contrast-induced nephropathy vs ATN in the setting of sepsis    Plan:  - Discontinue contiunous normal saline infusion  - F/u urine sample under microscopy  - Urine studies consistent with pre-renal DEEPALI  - Retroperitoneal U/S not concerning for hydronephrosis  - Trend Cr/ Serial BMPs  - Strict I&Os, close monitoring of UOP  - Replace electrolytes PRN, goal K/Phos/Mg 4/3/2  - Avoid nephrotoxic agents when feasible (NSAIDs, ACEi/ARB, IV radiocontrast, gadolinium, etc.)  - Avoid Fleet's and Brown Bomb Enemas given their propensity to induce hypermagnesemia  - Renally dose all meds to eGFR  - Goal MAP > 65 mmHg  - No acute indications for RRT at this time         Thank you for your consult. I will follow-up with patient. Please contact us if you have any additional questions.    Dayami Montgomery MD  Nephrology  [facility] - Surgery

## 2024-09-13 NOTE — PROGRESS NOTES
[facility]  Vascular Surgery  Progress Note    Patient Name: Cortes Schroeder  MRN: [id number]  Admission Date: 9/6/2024  Primary Care Provider: Andrew Sinclair Jr., MD    Subjective:     Interval History: Patient's mental status is slightly improved today, yet he still remains confused, cannot recall events from day prior. Creatinine 3 today from 2.6. Patient's pain is controlled.     Post-Op Info:  Procedure(s) (LRB):  Angiogram, Lower Arterial, Unilateral (Right)   1 Day Post-Op     Medications:  Continuous Infusions:  Scheduled Meds:   doxycycline  100 mg Oral Q12H    enoxaparin  30 mg Subcutaneous Daily    guaiFENesin 100 mg/5 ml  400 mg Oral Q6H    insulin glargine U-100  18 Units Subcutaneous QHS    morphine  2 mg Intravenous Once    nortriptyline  50 mg Oral Nightly    oxacillin 12 g in  mL CONTINUOUS INFUSION  12 g Intravenous Q24H    pantoprazole  40 mg Oral Daily    polyethylene glycol  17 g Oral BID    senna  8.6 mg Oral BID    sodium bicarbonate  650 mg Oral TID     PRN Meds:  Current Facility-Administered Medications:     acetaminophen, 500 mg, Oral, Q8H PRN    albuterol-ipratropium, 3 mL, Nebulization, Q4H PRN    calcium carbonate, 1,000 mg, Oral, TID PRN    dextrose 10%, 12.5 g, Intravenous, PRN    dextrose 10%, 12.5 g, Intravenous, PRN    dextrose 10%, 25 g, Intravenous, PRN    dextrose 10%, 25 g, Intravenous, PRN    glucagon (human recombinant), 1 mg, Intramuscular, PRN    glucose, 16 g, Oral, PRN    glucose, 24 g, Oral, PRN    hydrALAZINE, 25 mg, Oral, Q8H PRN    insulin aspart U-100, 0-10 Units, Subcutaneous, Q4H PRN    morphine, 2 mg, Intravenous, Q6H PRN    morphine, 4 mg, Intravenous, Q6H PRN    ondansetron, 8 mg, Oral, Q8H PRN     Objective:     Vital Signs (Most Recent):  Temp: 98.3 °F (36.8 °C) (09/13/24 0734)  Pulse: 99 (09/13/24 0734)  Resp: 20 (09/13/24 0734)  BP: (!) 149/69 (09/13/24 0734)  SpO2: 97 % (09/13/24 0734) Vital Signs (24h Range):  Temp:  [98.3 °F (36.8  °C)-100 °F (37.8 °C)] 98.3 °F (36.8 °C)  Pulse:  [] 99  Resp:  [18-45] 20  SpO2:  [95 %-100 %] 97 %  BP: (127-180)/(64-85) 149/69          Physical Exam  Eyes:      Pupils: Pupils are equal, round, and reactive to light.   Cardiovascular:      Rate and Rhythm: Normal rate.      Comments: Unable to assess right lower extremity signals given presence of cast.  Pulmonary:      Effort: Pulmonary effort is normal.   Abdominal:      General: Abdomen is flat.   Musculoskeletal:      Comments: RLE in cast.    Skin:     General: Skin is warm.   Neurological:      Mental Status: He is alert.          Significant Labs:  All pertinent labs from the last 24 hours have been reviewed.    Significant Diagnostics:  I have reviewed all pertinent imaging results/findings within the past 24 hours.  Assessment/Plan:     PAD (peripheral artery disease)  64-year-old gentleman presenting with nonhealing wound of the right lower extremity.    - Cath lab RLE angiogram aborted on 9/12 secondary to increased work of breathing, altered mental status, not following directions.   - RLE angiogram pending medical optimization and Creatinine stabilization           Leatha Sims MD  Vascular Surgery  [facility]

## 2024-09-13 NOTE — SUBJECTIVE & OBJECTIVE
"Interval HPI:   No acute events overnight. Patient in room 542/542 A. Blood glucose variable. BG below goal on current insulin regimen (SSI, prandial, and basal insulin ). Steroid use- None.   1 Day Post-Op  Renal function- Abnormal - 3.0 cr   Vasopressors-  None     Diet Clear liquid (no sugar)/Bariatric     Eating:   <25%  Nausea: No  Hypoglycemia and intervention: Yes, hypoglycemic after dinner dose of novolog given -- patient does not remember eating dinner last night, intake documented at only 25%. Dextrose given- resolved. BG trending below goal- will further reduce insulin doses.   Fever: No  TPN and/or TF: No    BP (!) 149/69 (BP Location: Right arm, Patient Position: Lying)   Pulse 99   Temp 98.3 °F (36.8 °C) (Oral)   Resp 20   Ht 5' 5" (1.651 m)   Wt 93 kg (205 lb 0.4 oz)   SpO2 97%   BMI 34.12 kg/m²     Labs Reviewed and Include    Recent Labs   Lab 09/13/24  0244   GLU 79   CALCIUM 7.7*   ALBUMIN 1.2*   PROT 5.9*   *   K 4.0   CO2 16*      BUN 37*   CREATININE 3.0*   ALKPHOS 494*   ALT 7*   AST 80*   BILITOT 0.4     Lab Results   Component Value Date    WBC 18.63 (H) 09/13/2024    HGB 7.9 (L) 09/13/2024    HCT 23.6 (L) 09/13/2024    MCV 85 09/13/2024     (H) 09/13/2024     No results for input(s): "TSH", "FREET4" in the last 168 hours.  Lab Results   Component Value Date    HGBA1C 8.5 (H) 09/06/2024       Nutritional status:   Body mass index is 34.12 kg/m².  Lab Results   Component Value Date    ALBUMIN 1.2 (L) 09/13/2024    ALBUMIN 1.4 (L) 09/12/2024    ALBUMIN 1.3 (L) 09/11/2024     Lab Results   Component Value Date    PREALBUMIN 3 (L) 09/06/2024    PREALBUMIN 13 (L) 07/18/2024       Estimated Creatinine Clearance: 26.4 mL/min (A) (based on SCr of 3 mg/dL (H)).    Accu-Checks  Recent Labs     09/12/24  0727 09/12/24  0843 09/12/24  1109 09/12/24  1458 09/12/24  1524 09/12/24  2142 09/12/24  2201 09/12/24  2244 09/13/24  0452 09/13/24  0722   POCTGLUCOSE 66* 96 138* 107 272* " 48* 145* 109 88 107       Current Medications and/or Treatments Impacting Glycemic Control  Immunotherapy:    Immunosuppressants       None          Steroids:   Hormones (From admission, onward)      None          Pressors:    Autonomic Drugs (From admission, onward)      None          Hyperglycemia/Diabetes Medications:   Antihyperglycemics (From admission, onward)      Start     Stop Route Frequency Ordered    09/13/24 2100  insulin glargine U-100 (Lantus) pen 18 Units         -- SubQ Nightly 09/13/24 0730    09/13/24 0830  insulin aspart U-100 pen 0-10 Units         -- SubQ Every 4 hours PRN 09/13/24 0731

## 2024-09-13 NOTE — ASSESSMENT & PLAN NOTE
Likely from meds versed, fentanyl for anioggram that was aborted on 9/12 due to change in mental status with poor clearance with DEEPALI as change in metnal status drastically after this was given 9/12, CT head wnl, ammonia/lactate, ck okay  Much improving mental status 9/13. Watch closely

## 2024-09-13 NOTE — CARE UPDATE
"RAPID RESPONSE NURSE CHART REVIEW        Chart Reviewed: 09/13/2024, 7:43 AM    MRN: [id number]  Bed: 542/542 A    Dx: Surgical wound breakdown    Cortes Schroeder has a past medical history of Anxiety, Back pain, Cataract, Chronic pain syndrome, Diabetes type 2, controlled, Diabetic retinopathy, DM (diabetes mellitus), DM (diabetes mellitus), type 2, uncontrolled, Gastroesophageal reflux disease without esophagitis, Hyperlipidemia, Insomnia, and Neuropathy.    Last VS: BP (!) 149/69 (BP Location: Right arm, Patient Position: Lying)   Pulse 99   Temp 98.3 °F (36.8 °C) (Oral)   Resp 20   Ht 5' 5" (1.651 m)   Wt 93 kg (205 lb 0.4 oz)   SpO2 97%   BMI 34.12 kg/m²     24H Vital Sign Range:  Temp:  [98.3 °F (36.8 °C)-100 °F (37.8 °C)]   Pulse:  []   Resp:  [18-45]   BP: (127-180)/(64-85)   SpO2:  [95 %-100 %]     Level of Consciousness (AVPU): alert    Recent Labs     09/11/24  1041 09/12/24  0537 09/12/24  2215 09/13/24  0244   WBC 21.86* 22.35*  --  18.63*   HGB 9.0* 8.5*  --  7.9*   HCT 27.1* 26.0* 22* 23.6*   * 520*  --  484*       Recent Labs     09/11/24  1041 09/12/24  0538 09/13/24  0244   * 131* 131*   K 3.4* 3.7 4.0    103 105   CO2 18* 17* 16*   BUN 30* 31* 37*   CREATININE 2.1* 2.6* 3.0*   * 56* 79   MG  --  2.4 2.4        Recent Labs     09/12/24  1620 09/12/24  2215   PH 7.365 7.357   PCO2 31.9* 31.8*   PO2 89 113*   HCO3 18.2* 17.8*   POCSATURATED 97 98   BE -7* -8*        OXYGEN:  Flow (L/min) (Oxygen Therapy): 3          MEWS score: 1    Rounding completed with charge RN Coourtney reports  patient AMS. Head Ct negative. Mental status much improved this morning. No additional concerns verbalized at this time. Instructed to call [phone] for further concerns or assistance.    Wade Lacey RN        "

## 2024-09-13 NOTE — ASSESSMENT & PLAN NOTE
Required delvalle 9/12 for retenetion, per nursing had had issues on/off the last few adys and had immediate return of >300 cc once placed and very concentrated dark urine. Urien with sediment now and darker yellow on exam 9/13. Questionable infectious process with bacerueia, hold treatment for now per ID and monitor. DEEPALI, unclear cause if from retention, renal consulted, will f/u trends

## 2024-09-13 NOTE — PLAN OF CARE
Pt free of trauma, falls, and injury. Pt VSS and afebrile throughout shift. Pt free of skin breakdown. Pt neurovascular checks are intact. Pt dressing is clean, dry, and intact. Pt pain has been moderately controlled by IV pain meds and tolerated well. Wound vac in tact  Pt has call light in reach, bed alarm on, bed brakes on, side rails up x2, bed in low position, SCD on, IS at bedside, and nonskid socks on. Pt lying in bed in no distress.          Problem: Adult Inpatient Plan of Care  Goal: Plan of Care Review  Outcome: Progressing  Goal: Patient-Specific Goal (Individualized)  Outcome: Progressing  Goal: Absence of Hospital-Acquired Illness or Injury  Outcome: Progressing  Goal: Optimal Comfort and Wellbeing  Outcome: Progressing  Goal: Readiness for Transition of Care  Outcome: Progressing     Problem: Fall Injury Risk  Goal: Absence of Fall and Fall-Related Injury  Outcome: Progressing     Problem: Sepsis/Septic Shock  Goal: Optimal Coping  Outcome: Progressing  Goal: Absence of Bleeding  Outcome: Progressing  Goal: Blood Glucose Level Within Targeted Range  Outcome: Progressing  Goal: Absence of Infection Signs and Symptoms  Outcome: Progressing  Goal: Optimal Nutrition Intake  Outcome: Progressing     Problem: Acute Kidney Injury/Impairment  Goal: Fluid and Electrolyte Balance  Outcome: Progressing  Goal: Improved Oral Intake  Outcome: Progressing  Goal: Effective Renal Function  Outcome: Progressing     Problem: Wound  Goal: Optimal Coping  Outcome: Progressing  Goal: Optimal Functional Ability  Outcome: Progressing  Goal: Absence of Infection Signs and Symptoms  Outcome: Progressing  Goal: Improved Oral Intake  Outcome: Progressing  Goal: Optimal Pain Control and Function  Outcome: Progressing  Goal: Skin Health and Integrity  Outcome: Progressing  Goal: Optimal Wound Healing  Outcome: Progressing     Problem: Diabetes Comorbidity  Goal: Blood Glucose Level Within Targeted Range  Outcome: Progressing      Problem: Skin Injury Risk Increased  Goal: Skin Health and Integrity  Outcome: Progressing     Problem: Infection  Goal: Absence of Infection Signs and Symptoms  Outcome: Progressing

## 2024-09-13 NOTE — SUBJECTIVE & OBJECTIVE
Principal Problem:Surgical wound breakdown    Principal Orthopedic Problem: s/p I&D and wound vac placement on 09/06    Interval History:   Angio aborted yesterday. Will reschedule once Cr improved - nephrology following. Mental status significant improved today alert and oriented x 4. Wound vac remains in place medial side wound vac lateral incisional vac.        Review of patient's allergies indicates:   Allergen Reactions    Invokana [canagliflozin] Anaphylaxis    Percocet [oxycodone-acetaminophen] Nausea Only and Hallucinations    Biaxin [clarithromycin]     Hydrocodone Other (See Comments)     Dizzy/nausea/hallucinations    Sulfa (sulfonamide antibiotics) Nausea Only and Rash       Current Facility-Administered Medications   Medication    acetaminophen tablet 500 mg    albuterol-ipratropium 2.5 mg-0.5 mg/3 mL nebulizer solution 3 mL    calcium carbonate 200 mg calcium (500 mg) chewable tablet 1,000 mg    dextrose 10% bolus 125 mL 125 mL    dextrose 10% bolus 125 mL 125 mL    dextrose 10% bolus 250 mL 250 mL    dextrose 10% bolus 250 mL 250 mL    doxycycline tablet 100 mg    enoxaparin injection 30 mg    glucagon (human recombinant) injection 1 mg    glucose chewable tablet 16 g    glucose chewable tablet 24 g    guaiFENesin 100 mg/5 ml syrup 400 mg    hydrALAZINE tablet 25 mg    insulin aspart U-100 pen 0-10 Units    insulin glargine U-100 (Lantus) pen 18 Units    morphine injection 2 mg    morphine injection 2 mg    morphine injection 4 mg    nortriptyline capsule 50 mg    ondansetron disintegrating tablet 8 mg    oxacillin 12 g in  mL CONTINUOUS INFUSION    pantoprazole EC tablet 40 mg    polyethylene glycol packet 17 g    senna tablet 8.6 mg    sodium bicarbonate tablet 650 mg     Objective:     Vital Signs (Most Recent):  Temp: 98.6 °F (37 °C) (09/13/24 1057)  Pulse: 103 (09/13/24 1447)  Resp: 20 (09/13/24 1405)  BP: 135/62 (09/13/24 1057)  SpO2: 95 % (09/13/24 1057) Vital Signs (24h Range):  Temp:   "[98.3 °F (36.8 °C)-100 °F (37.8 °C)] 98.6 °F (37 °C)  Pulse:  [] 103  Resp:  [18-25] 20  SpO2:  [95 %-98 %] 95 %  BP: (127-179)/(62-85) 135/62     Weight: 93 kg (205 lb 0.4 oz)  Height: 5' 5" (165.1 cm)  Body mass index is 34.12 kg/m².      Intake/Output Summary (Last 24 hours) at 9/13/2024 1451  Last data filed at 9/13/2024 0456  Gross per 24 hour   Intake --   Output 680 ml   Net -680 ml        Ortho/SPM Exam     AAOx4  NAD  Tachycardic  No increased WOB    RLE  Dressing C/D/I   Wound vac to good suction  Compartments soft/compressible  Reduced sensation to the dorsum of toe  Active toe dorsiflexion & plantarflexion  WWP. Brisk cap refill      Significant Labs:   Recent Labs   Lab 09/13/24  0244   WBC 18.63*   RBC 2.79*   HGB 7.9*   HCT 23.6*   *   MCV 85   MCH 28.3   MCHC 33.5         Significant Imaging: I have reviewed and interpreted all pertinent imaging results/findings.  CTA RLE: Multifocal high-grade stenoses throughout the right calf arteries. No arterial occlusion.    CTA chest: No large central PE identified  "

## 2024-09-13 NOTE — PROGRESS NOTES
[facility]  Endocrinology  Progress Note    Admit Date: 9/6/2024     Reason for Consult: Management of T2DM, Hyperglycemia      Surgical Procedure and Date: S/P irrigation debridement of RLE on 09/06/2024    Diabetes diagnosis year: 1995     Home Diabetes Medications:    Humalog via OmniPod insulin pump  Mounjaro 10 mg weekly     Current pump settings:  Basal 12 am to 2.3 and 6 am to 1.55  ICR to 3 at 12 am, 2 at 4 pm  ISF to 1:20   AIT 3 hrs  Target 110-120        Patient had anaphylaxis reaction to SGLT2 inhibitors specifically Invokana     How often checking glucose at home? Dexcom G6   BG readings on regimen: Average 210's per Dexcom  Hypoglycemia on the regimen?  Yes  Missed doses on regimen?  No     Diabetes Complications include:     Hyperglycemia and Diabetic peripheral neuropathy         Complicating diabetes co morbidities:   HLD, HTN, Obesity         HPI: 64 y.o. male presents to the ED w/ complaint of altered mental status. Hx of R ankle fracture with surgery over a month ago. Patient now presents with sepsis 2/2 R ankle infection s/p ORIF on 07/26. Started on IV vanc and cefepime. Given IVFs. Blood cultures pending. Brought to OR with ortho on 09/06 for irrigation debridement of RLE. Endocrine following for BG and type 2 diabetes.     Lab Results   Component Value Date    LABA1C 10.8 (H) 08/15/2016    HGBA1C 8.5 (H) 09/06/2024         Interval HPI:   No acute events overnight. Patient in room 542/542 A. Blood glucose variable. BG below goal on current insulin regimen (SSI, prandial, and basal insulin ). Steroid use- None.   1 Day Post-Op  Renal function- Abnormal - 3.0 cr   Vasopressors-  None     Diet Clear liquid (no sugar)/Bariatric     Eating:   <25%  Nausea: No  Hypoglycemia and intervention: Yes, hypoglycemic after dinner dose of novolog given -- patient does not remember eating dinner last night, intake documented at only 25%. Dextrose given- resolved. BG trending below goal- will further  "reduce insulin doses.   Fever: No  TPN and/or TF: No    BP (!) 149/69 (BP Location: Right arm, Patient Position: Lying)   Pulse 99   Temp 98.3 °F (36.8 °C) (Oral)   Resp 20   Ht 5' 5" (1.651 m)   Wt 93 kg (205 lb 0.4 oz)   SpO2 97%   BMI 34.12 kg/m²     Labs Reviewed and Include    Recent Labs   Lab 09/13/24  0244   GLU 79   CALCIUM 7.7*   ALBUMIN 1.2*   PROT 5.9*   *   K 4.0   CO2 16*      BUN 37*   CREATININE 3.0*   ALKPHOS 494*   ALT 7*   AST 80*   BILITOT 0.4     Lab Results   Component Value Date    WBC 18.63 (H) 09/13/2024    HGB 7.9 (L) 09/13/2024    HCT 23.6 (L) 09/13/2024    MCV 85 09/13/2024     (H) 09/13/2024     No results for input(s): "TSH", "FREET4" in the last 168 hours.  Lab Results   Component Value Date    HGBA1C 8.5 (H) 09/06/2024       Nutritional status:   Body mass index is 34.12 kg/m².  Lab Results   Component Value Date    ALBUMIN 1.2 (L) 09/13/2024    ALBUMIN 1.4 (L) 09/12/2024    ALBUMIN 1.3 (L) 09/11/2024     Lab Results   Component Value Date    PREALBUMIN 3 (L) 09/06/2024    PREALBUMIN 13 (L) 07/18/2024       Estimated Creatinine Clearance: 26.4 mL/min (A) (based on SCr of 3 mg/dL (H)).    Accu-Checks  Recent Labs     09/12/24  0727 09/12/24  0843 09/12/24  1109 09/12/24  1458 09/12/24  1524 09/12/24  2142 09/12/24  2201 09/12/24  2244 09/13/24  0452 09/13/24  0722   POCTGLUCOSE 66* 96 138* 107 272* 48* 145* 109 88 107       Current Medications and/or Treatments Impacting Glycemic Control  Immunotherapy:    Immunosuppressants       None          Steroids:   Hormones (From admission, onward)      None          Pressors:    Autonomic Drugs (From admission, onward)      None          Hyperglycemia/Diabetes Medications:   Antihyperglycemics (From admission, onward)      Start     Stop Route Frequency Ordered    09/13/24 2100  insulin glargine U-100 (Lantus) pen 18 Units         -- SubQ Nightly 09/13/24 0730    09/13/24 0830  insulin aspart U-100 pen 0-10 Units      "    -- SubQ Every 4 hours PRN 09/13/24 0731            ASSESSMENT and PLAN    Renal/  DEEPALI (acute kidney injury)  Titrate insulin slowly to avoid hypoglycemia as the risk of hypoglycemia increases with decreased creatinine clearance.  Estimated Creatinine Clearance: 26.4 mL/min (A) (based on SCr of 3 mg/dL (H)).        Endocrine  Uncontrolled diabetes mellitus with hyperglycemia  Endocrinology consulted for BG management.   BG goal 140-180    - Lantus (Insulin Glargine 18 units nightly  (re-WBD at 0.4 u/kg/day given fasting BG trending below goal today) (HOLD if BG < 100 prior to bed given hx of hypoglycemia)   - D/c scheduled novolog as patient's diet was changed to sugar free bariatric clears.  - [facility] SSI (150/25)  - BG checks q4hr  - Hypoglycemia protocol in place    ** Please notify Endocrine for any change and/or advance in diet**  ** Please call Endocrine for any BG related issues **    Discharge Planning:   TBD. Please notify endocrinology prior to discharge.            Payton Vora PA-C  Endocrinology  [facility]

## 2024-09-13 NOTE — ASSESSMENT & PLAN NOTE
Had issue last admit with prolonged constipation requiring enemas  -on bowel regimen  -has some bowel gas seen throughout CT abdomen, will giev suppository 9/13 to stay ahead

## 2024-09-13 NOTE — SUBJECTIVE & OBJECTIVE
Medications:  Continuous Infusions:  Scheduled Meds:   doxycycline  100 mg Oral Q12H    enoxaparin  30 mg Subcutaneous Daily    guaiFENesin 100 mg/5 ml  400 mg Oral Q6H    insulin glargine U-100  18 Units Subcutaneous QHS    morphine  2 mg Intravenous Once    nortriptyline  50 mg Oral Nightly    oxacillin 12 g in  mL CONTINUOUS INFUSION  12 g Intravenous Q24H    pantoprazole  40 mg Oral Daily    polyethylene glycol  17 g Oral BID    senna  8.6 mg Oral BID    sodium bicarbonate  650 mg Oral TID     PRN Meds:  Current Facility-Administered Medications:     acetaminophen, 500 mg, Oral, Q8H PRN    albuterol-ipratropium, 3 mL, Nebulization, Q4H PRN    calcium carbonate, 1,000 mg, Oral, TID PRN    dextrose 10%, 12.5 g, Intravenous, PRN    dextrose 10%, 12.5 g, Intravenous, PRN    dextrose 10%, 25 g, Intravenous, PRN    dextrose 10%, 25 g, Intravenous, PRN    glucagon (human recombinant), 1 mg, Intramuscular, PRN    glucose, 16 g, Oral, PRN    glucose, 24 g, Oral, PRN    hydrALAZINE, 25 mg, Oral, Q8H PRN    insulin aspart U-100, 0-10 Units, Subcutaneous, Q4H PRN    morphine, 2 mg, Intravenous, Q6H PRN    morphine, 4 mg, Intravenous, Q6H PRN    ondansetron, 8 mg, Oral, Q8H PRN     Objective:     Vital Signs (Most Recent):  Temp: 98.3 °F (36.8 °C) (09/13/24 0734)  Pulse: 99 (09/13/24 0734)  Resp: 20 (09/13/24 0734)  BP: (!) 149/69 (09/13/24 0734)  SpO2: 97 % (09/13/24 0734) Vital Signs (24h Range):  Temp:  [98.3 °F (36.8 °C)-100 °F (37.8 °C)] 98.3 °F (36.8 °C)  Pulse:  [] 99  Resp:  [18-45] 20  SpO2:  [95 %-100 %] 97 %  BP: (127-180)/(64-85) 149/69          Physical Exam  Eyes:      Pupils: Pupils are equal, round, and reactive to light.   Cardiovascular:      Rate and Rhythm: Normal rate.      Comments: Unable to assess right lower extremity signals given presence of cast.  Pulmonary:      Effort: Pulmonary effort is normal.   Abdominal:      General: Abdomen is flat.   Musculoskeletal:      Comments: RLE in  cast.    Skin:     General: Skin is warm.   Neurological:      Mental Status: He is alert.          Significant Labs:  All pertinent labs from the last 24 hours have been reviewed.    Significant Diagnostics:  I have reviewed all pertinent imaging results/findings within the past 24 hours.

## 2024-09-13 NOTE — ASSESSMENT & PLAN NOTE
Seen on CT, unclear cause as no hx of prologned intubation, etc.  -continu supportiv care, nebs, IS, humidified oxygen

## 2024-09-13 NOTE — ASSESSMENT & PLAN NOTE
64-year-old gentleman presenting with nonhealing wound of the right lower extremity.    - Cath lab RLE angiogram aborted on 9/12 after needle access secondary to increased work of breathing, altered mental status, not following directions.   - RLE angiogram pending medical optimization and Creatinine stabilization

## 2024-09-13 NOTE — ASSESSMENT & PLAN NOTE
Coughing on exam witnesseed 9/13 with water and some secretions in airway on CT, speech eval placed

## 2024-09-13 NOTE — PROGRESS NOTES
[facility]  Orthopedics  Progress Note    Attg Note:  I agree with the resident's assessment and plan.  Vascular surgery intervention postpone today secondary to rising creatinine.    Moe Liz MD      Patient Name: Cortes Schroeder  MRN: [id number]  Admission Date: 9/6/2024  Hospital Length of Stay: 7 days  Attending Provider: Maria Del Rosario Medina MD  Primary Care Provider: Andrew Sinclair Jr., MD  Follow-up For: Procedure(s) (LRB):  Angiogram, Lower Arterial, Unilateral (Right)    Post-Operative Day: 1 Day Post-Op  Subjective:     Principal Problem:Surgical wound breakdown    Principal Orthopedic Problem: s/p I&D and wound vac placement on 09/06    Interval History:   Angio aborted yesterday. Will reschedule once Cr improved - nephrology following. Mental status significant improved today alert and oriented x 4. Wound vac remains in place medial side wound vac lateral incisional vac.        Review of patient's allergies indicates:   Allergen Reactions    Invokana [canagliflozin] Anaphylaxis    Percocet [oxycodone-acetaminophen] Nausea Only and Hallucinations    Biaxin [clarithromycin]     Hydrocodone Other (See Comments)     Dizzy/nausea/hallucinations    Sulfa (sulfonamide antibiotics) Nausea Only and Rash       Current Facility-Administered Medications   Medication    acetaminophen tablet 500 mg    albuterol-ipratropium 2.5 mg-0.5 mg/3 mL nebulizer solution 3 mL    calcium carbonate 200 mg calcium (500 mg) chewable tablet 1,000 mg    dextrose 10% bolus 125 mL 125 mL    dextrose 10% bolus 125 mL 125 mL    dextrose 10% bolus 250 mL 250 mL    dextrose 10% bolus 250 mL 250 mL    doxycycline tablet 100 mg    enoxaparin injection 30 mg    glucagon (human recombinant) injection 1 mg    glucose chewable tablet 16 g    glucose chewable tablet 24 g    guaiFENesin 100 mg/5 ml syrup 400 mg    hydrALAZINE tablet 25 mg    insulin aspart U-100 pen 0-10 Units    insulin glargine U-100 (Lantus) pen 18 Units     "morphine injection 2 mg    morphine injection 2 mg    morphine injection 4 mg    nortriptyline capsule 50 mg    ondansetron disintegrating tablet 8 mg    oxacillin 12 g in  mL CONTINUOUS INFUSION    pantoprazole EC tablet 40 mg    polyethylene glycol packet 17 g    senna tablet 8.6 mg    sodium bicarbonate tablet 650 mg     Objective:     Vital Signs (Most Recent):  Temp: 98.6 °F (37 °C) (09/13/24 1057)  Pulse: 103 (09/13/24 1447)  Resp: 20 (09/13/24 1405)  BP: 135/62 (09/13/24 1057)  SpO2: 95 % (09/13/24 1057) Vital Signs (24h Range):  Temp:  [98.3 °F (36.8 °C)-100 °F (37.8 °C)] 98.6 °F (37 °C)  Pulse:  [] 103  Resp:  [18-25] 20  SpO2:  [95 %-98 %] 95 %  BP: (127-179)/(62-85) 135/62     Weight: 93 kg (205 lb 0.4 oz)  Height: 5' 5" (165.1 cm)  Body mass index is 34.12 kg/m².      Intake/Output Summary (Last 24 hours) at 9/13/2024 1451  Last data filed at 9/13/2024 0456  Gross per 24 hour   Intake --   Output 680 ml   Net -680 ml        Ortho/SPM Exam     AAOx4  NAD  Tachycardic  No increased WOB    RLE  Dressing C/D/I   Wound vac to good suction  Compartments soft/compressible  Reduced sensation to the dorsum of toe  Active toe dorsiflexion & plantarflexion  WWP. Brisk cap refill      Significant Labs:   Recent Labs   Lab 09/13/24  0244   WBC 18.63*   RBC 2.79*   HGB 7.9*   HCT 23.6*   *   MCV 85   MCH 28.3   MCHC 33.5         Significant Imaging: I have reviewed and interpreted all pertinent imaging results/findings.  CTA RLE: Multifocal high-grade stenoses throughout the right calf arteries. No arterial occlusion.    CTA chest: No large central PE identified  Assessment/Plan:     * Surgical wound breakdown  Cortes Schroeder is a 64 y.o. male with PMH of DM, HTN  and right trimalleolar ankle fracture status post ex fix on 07/18 with subsequent definitive fixation on 07/26 admitted with right ankle wound dehiscence in the setting of uncontrolled diabetes.      He is s/p I&D of R ankle 09/06. " Repeat I&D R medial ankle 09/09. Pending angiography with vascular once Cr improves.     VS: vitals improved today   Labs: Worsening DEEPALI - Cr 3.0   Diet: ok for diet, vascular following for angio   Pain control: multimodal regimen  PT/OT:  NWB RLE   DVT PPx: Lovenox 30 renally dosed   Abx:  oxacillin continuous; ID following   Cultures:  ankle cx staph +      Dispo: appreciate plastics and vascular recs, pending angiogram once Cr improves                  Artur Galvan MD  Orthopedics  [facility] - Surgery

## 2024-09-13 NOTE — SUBJECTIVE & OBJECTIVE
"Interval History: he had IVF decreased overnight after night NP Thad and rapid team/ICU NP discussed case further and had close monitoring overnight and thad dawson messaged me this morning to discuss overnight events to update me. This morning he was much improved in regards to mental status from yesterday afternoon and suspect was sedation medications given for aborted angiogram that caused rapid change in mental status. He is near his typical baseline from previous admit (not completely there but similar more than yesterday am also) and able to have full conversation with me. No stopping for dypsnea and speaking in full sentences and logical and fluent in conversation. On initial presentaiton he was using his IS when I walked into room without prompting on his own volition. He reports his mouth feels very dry and when drinking water he had coughign episode and some concerns for possible aspiration and speech consulted for this. On CT had no signs of major pneumonia or volume overloadb ut some chronic tracheomalachia signs, secretions in esophagus, possible GERD etiology also. He reports being told he does snore very loudly long term but hasn't been told he stops breathing overnight and isnt aware of a history of "floppy airway" or ever had intuations/prolonaged intubation or any breathing issues as a child/intubation as a child, etc. Having some clear/white sputum and feels like "congestion breaking up" in his chest he said, change dmucinex from pill to liquid form today. He reports he did have 1 day thsi week where he did note a good bit or burnign with urination but did not remember yesterday at all when he was retainign urine. He remmebers feeling like he was "on a ship and given injection through the IV, 2 of them, and then not wanting to listen and not knowing what was going on" and then next thing he knew he was back in his room. Suspect describing experience in angiogram area as that is when he was " repoted to get confused very quickly after injectino of sedatino pushes. He doesn't remmeber being on floor and confused after that but he does remember seeing me yesterday morning but not throughout afternoon.   CT head without any abnromalities and CT chest/abd/pelvis with known previous calcifications in pancrease and tracheomlachia, hyperinflation of lungs. Reviweed results with him. Some gaseous distension seen oin colon and will giev suppository to help promote as had issues with constipation last admit that requried multiple suppositories as to not get behind as he did last admit again that far.  Procal improving to 1.4. lactate/ammonia/CK wnl yesterady. Full urine studies not collected yesterady as ordered once delvalle placed, UA with bactereuai, discussed with ID who recs holding off on broadening antibiotics for now and watchign CX data. AST 80 as had been up/down. Wbc improving at 18. Cr higher at 3.0 from 2.6. renal consutled overnight, US with medical renal disease. Gallbladder on imaging with some distension but no wall thickening noted. Tachycardia improved to 90s from 110s with fluids yesterday. Renal consulted overnight and will f/u recs further today. Had 2 IV contrast loads earlier this week for CTAs so may be a possible contribtuor as well as retention seen before delvalle placement yesterday as well as some signs of subjective dehydration with dry mouth on exam with concentraed urine with sedimnent seen in delvalle tubing this morning and dark urine/very concentrated per nursing when placed yestserday.   Angiogram today cancelled and on hold until cr better.   Hypoglycemia overnight and endo adjusting insulin. On bariatric clears for now until speech evaluates further today.      Review of Systems   Constitutional:  Positive for activity change and fatigue. Negative for chills and fever.   Respiratory:  Positive for cough. Negative for chest tightness and shortness of breath.    Cardiovascular:   Negative for chest pain and leg swelling.   Gastrointestinal:  Positive for nausea. Negative for abdominal pain.   Musculoskeletal:  Positive for arthralgias.   Skin:  Positive for color change and wound.   Neurological:  Negative for dizziness and weakness.     Objective:     Vital Signs (Most Recent):  Temp: 98.3 °F (36.8 °C) (09/13/24 0734)  Pulse: 99 (09/13/24 0734)  Resp: 20 (09/13/24 0734)  BP: (!) 149/69 (09/13/24 0734)  SpO2: 97 % (09/13/24 0734) Vital Signs (24h Range):  Temp:  [98.3 °F (36.8 °C)-100 °F (37.8 °C)] 98.3 °F (36.8 °C)  Pulse:  [] 99  Resp:  [18-45] 20  SpO2:  [95 %-100 %] 97 %  BP: (127-180)/(64-85) 149/69     Weight: 93 kg (205 lb 0.4 oz)  Body mass index is 34.12 kg/m².    Intake/Output Summary (Last 24 hours) at 9/13/2024 1041  Last data filed at 9/13/2024 0456  Gross per 24 hour   Intake --   Output 680 ml   Net -680 ml         Physical Exam  Vitals and nursing note reviewed.   Constitutional:       Appearance: He is well-developed. He is obese. He is ill-appearing.      Comments: Confusion improved. Closer to baseline, much better from yesterday PM. On oxygen   Eyes:      Pupils: Pupils are equal, round, and reactive to light.   Cardiovascular:      Rate and Rhythm: Regular rhythm. Tachycardia present.      Comments: HR 90s  Pulmonary:      Effort: Pulmonary effort is normal.      Breath sounds: Rales (bibasilar) present.      Comments: On NC. Speaking in full sentenecs without stopping for dyspnea. No crackles. Small amoutns of yellow/white sputum in tissue. Cough on exam with drinking water.  Abdominal:      Palpations: Abdomen is soft.      Tenderness: There is no abdominal tenderness.   Musculoskeletal:         General: No tenderness.      Comments: Bandages in place. Wound vac with dark red output.   Skin:     General: Skin is warm and dry.      Comments: C/d/I dressing to R ankle with wound vac.    Neurological:      Mental Status: He is alert and oriented to person, place,  and time.      Comments: Close to mental status baseline, much improved   Psychiatric:         Behavior: Behavior normal.             Significant Labs: All pertinent labs within the past 24 hours have been reviewed.  CBC:   Recent Labs   Lab 09/12/24  0537 09/12/24 2215 09/13/24 0244   WBC 22.35*  --  18.63*   HGB 8.5*  --  7.9*   HCT 26.0* 22* 23.6*   *  --  484*     CMP:   Recent Labs   Lab 09/12/24  0538 09/13/24 0244   * 131*   K 3.7 4.0    105   CO2 17* 16*   GLU 56* 79   BUN 31* 37*   CREATININE 2.6* 3.0*   CALCIUM 7.9* 7.7*   PROT 6.5 5.9*   ALBUMIN 1.4* 1.2*   BILITOT 0.4 0.4   ALKPHOS 484* 494*   AST 96* 80*   ALT 9* 7*   ANIONGAP 11 10       Significant Imaging: I have reviewed all pertinent imaging results/findings within the past 24 hours.

## 2024-09-13 NOTE — HPI
Mr. Schroeder is a 64yoM w/hx of T2DM, anxiety, GERD, HLD, HTN, previous syncopal episodes with recent ankle fracture and distal left radius fracture after a ground level fall s/p R ankle ex-fix (7/18) and L DR ORIF(7/19) who presented to the hospital for AMS. He was admitted to the hospital on 9/6 for sepsis secondary to surgical wound breakdown now s/p excisional debridement and irrigation (9/6) and and excisional and non-excisional debridement of tissue necrosis of the right ankle (9/9). His hospitalization has been complicated by staph aureus bacteremia and group B strep. Followed by ID and on IV Oxacillin, previously on Zosyn and Vancomycin.     BUN/Cr 37/3.0, up from his baseline Cr ~1.0. Labs are notable for WBC 18.63, H/H 7.9/23.6, , Na 131, Bicarb 16, , Serum osm 284, Urine Cr 129, Urine Osm 600, Urine Na 20, CK WNL. Nephrology was consulted for DEEPALI.

## 2024-09-13 NOTE — PT/OT/SLP EVAL
Speech Language Pathology Evaluation  Bedside Swallow    Patient Name:  Cortes Schroeder   MRN:  [id number]  Admitting Diagnosis: Surgical wound breakdown    Recommendations:                 General Recommendations:  Dysphagia therapy  Diet recommendations:   , Thin liquids - IDDSI Level 0, Full liquids   Aspiration Precautions: Eliminate distractions, HOB to 90 degrees, Meds whole 1 at a time, No straws, Small bites/sips, and Standard aspiration precautions   General Precautions: Standard, aspiration, fall  Communication strategies:  none    Assessment:     Cortes Schroeder is a 64 y.o. male with an SLP diagnosis of Dysphagia.      History:     Past Medical History:   Diagnosis Date    Anxiety 12/18/2012    Back pain 12/18/2012    Cataract     Chronic pain syndrome 04/24/2016    Diabetes type 2, controlled 02/20/2016    Diabetic retinopathy     DM (diabetes mellitus) 12/18/2012    DM (diabetes mellitus), type 2, uncontrolled 11/16/2013    Gastroesophageal reflux disease without esophagitis 02/20/2016    Hyperlipidemia 12/18/2012    Insomnia 08/07/2014    Neuropathy 11/16/2013       Past Surgical History:   Procedure Laterality Date    APPLICATION OF WOUND VACUUM-ASSISTED CLOSURE DEVICE Right 9/6/2024    Procedure: APPLICATION, WOUND VAC;  Surgeon: Moe Liz MD;  Location: [address];  Service: Orthopedics;  Laterality: Right;    APPLICATION, EXTERNAL FIXATION DEVICE, FOR ANKLE FRACTURE Right 7/18/2024    Procedure: APPLICATION, EXTERNAL FIXATION DEVICE, FOR ANKLE FRACTURE;  Surgeon: Moe Liz MD;  Location: [address];  Service: Orthopedics;  Laterality: Right;    ARTHROTOMY OF ANKLE Right 9/9/2024    Procedure: ARTHROTOMY, ANKLE;  Surgeon: Moe Liz MD;  Location: [address];  Service: Orthopedics;  Laterality: Right;    BACK SURGERY  2003    Lumbar Spine    CATARACT EXTRACTION      CLOSED REDUCTION, FRACTURE, ANKLE, TRIMALLEOLAR Right 7/18/2024    Procedure: CLOSED  REDUCTION, FRACTURE, ANKLE, TRIMALLEOLAR;  Surgeon: Moe Liz MD;  [facility];  Service: Orthopedics;  Laterality: Right;    EPIDURAL STEROID INJECTION N/A 1/16/2019    Procedure: Injection, Steroid, Epidural Cervical;  Surgeon: Andrew Sinclair Jr., MD;  Location: [facility] ENDO;  Service: Pain Management;  Laterality: N/A;  Cervical Epidural Steroid Injection C7-T1    75364    Arrive @ 1240    EPIDURAL STEROID INJECTION N/A 2/20/2019    Procedure: Injection, Steroid, Epidural;  Surgeon: Andrew Sinclair Jr., MD;  Location: [facility] ENDO;  Service: Pain Management;  Laterality: N/A;  Lumbar Epidural Steroid Injection L4-5    30440    Arrive @ 1215 (requests latest time)    FIXATION OF SYNDESMOSIS OF ANKLE Right 7/26/2024    Procedure: FIXATION, SYNDESMOSIS, ANKLE;  Surgeon: Moe Liz MD;  Location: [facility] OR [address] [place] [address];  Service: Orthopedics;  Laterality: Right;    INJECTION, SPINE, LUMBOSACRAL, TRANSFORAMINAL APPROACH Bilateral 6/14/2024    Procedure: Bilateral L5 Transforaminal Epidural Steroid Injections;  Surgeon: Andrew Sinclair Jr., MD;  Location: [facility]EMENT;  Service: Pain Management;  Laterality: Bilateral;  @1300(given)  ASA last 6/8  Check BG  MD Sign.    IRRIGATION AND DEBRIDEMENT Right 9/6/2024    Procedure: IRRIGATION AND DEBRIDEMENT;  Surgeon: Moe Liz MD;  Location: [facility] OR [address] [place] [address];  Service: Orthopedics;  Laterality: Right;    IRRIGATION AND DEBRIDEMENT OF LOWER EXTREMITY Right 9/9/2024    Procedure: IRRIGATION AND DEBRIDEMENT, LOWER EXTREMITY - WITH WOUND VAC CHANGE, RIGHT ANKLE;  Surgeon: Moe Liz MD;  Location: [facility] OR [address] [place] [address];  Service: Orthopedics;  Laterality: Right;  WITH WOUND VAC CHANGE, RIGHT ANKLE    OPEN REDUCTION AND INTERNAL FIXATION (ORIF) OF FRACTURE OF DISTAL RADIUS Left 7/19/2024    Procedure: ORIF, FRACTURE, RADIUS, DISTAL - LEFT;  Surgeon: Moe Liz MD;  Location: [facility] OR [address] [place] [address];  Service: Orthopedics;   "Laterality: Left;  LEFT, SYNTHES, C ARM    OPEN REDUCTION AND INTERNAL FIXATION (ORIF) OF INJURY OF ANKLE Right 7/26/2024    Procedure: ORIF, ANKLE;  Surgeon: Moe Liz MD;  [facility];  Service: Orthopedics;  Laterality: Right;    REMOVAL OF EXTERNAL FIXATION DEVICE Right 7/26/2024    Procedure: REMOVAL, EXTERNAL FIXATION DEVICE;  Surgeon: Moe Liz MD;  [facility];  Service: Orthopedics;  Laterality: Right;       Prior Intubation HX:  procedures on 9/6 and 9/8    Modified Barium Swallow: none    Chest X-Rays: 9/12/24 "Impression: Coarse interstitial lung markings and scattered subsegmental atelectasis.  No large focal consolidation identified on this limited single view.  Overall lungs are better evaluated on CT chest obtained earlier the same day."    Prior diet: regular/thin.    Subjective     Pt sitting upright in bed with family member present at bedside. Pt agreeable to participate in session.     Pain/Comfort:  Pain Rating 1:  (globus sensation in sternal regiona, pain from ankle)    Respiratory Status: Nasal cannula, flow 3 L/min    Objective:     Oral Musculature Evaluation  Oral Musculature: WFL  Dentition: present and adequate  Oral Labial Strength and Mobility: WFL  Lingual Strength and Mobility: WFL  Volitional Cough: congested  Voice Prior to PO Intake: clear    Bedside Swallow Eval:   Consistencies Assessed:  Thin liquids via cup x4, straw x2      Oral Phase:   WFL    Pharyngeal Phase:   Coughing and throat clearing noted with thin liquids via straw  No overt signs of aspiration noted with thin liquids via cup  Vocal quality remained clear across all trials    Compensatory Strategies  None    Treatment: Pt with congested cough prior to PO trials. Pt denied any solid trials so solids were unable to be assessed. Pt with complaints of globus sensation in sternal region. Pt reports some acid reflux. Pt belching throughout trials. Further GI workup may be " warranted if symptoms persists. SLP notified MD of this. Pt appears safe to continue full liquid diet with the following precautions: no straws, upright for meals, small sips). Solids should be assessed in future session. SLP provided education regarding role of SLP, aspiration precautions, signs of aspiration, and overall impressions and recommendation. Pt and family member expressed understanding. SLP notified MD of recommendations.     Goals:   Multidisciplinary Problems       SLP Goals          Problem: SLP    Goal Priority Disciplines Outcome   SLP Goal     SLP    Description: Speech Language Pathology Goals  Goals expected to be met by 9/27    1. Pt will tolerate least restrictive diet with no signs of airway comprise                              Plan:     Patient to be seen:  4 x/week   Plan of Care expires:  10/11/24  Plan of Care reviewed with:  patient, family   SLP Follow-Up:  Yes       Discharge recommendations:  High Intensity Therapy     Time Tracking:     SLP Treatment Date:   09/13/24  Speech Start Time:  1340  Speech Stop Time:  1348     Speech Total Time (min):  8 min    Billable Minutes: Treatment Swallowing Dysfunction 8    09/13/2024

## 2024-09-13 NOTE — ASSESSMENT & PLAN NOTE
Covering doxy for URI, on mucinex, mostly dry but now with some scant white/clear sputum on 9./13

## 2024-09-13 NOTE — ASSESSMENT & PLAN NOTE
- liver enzymes up and down, highest ast at 155, between   Now at 96-80  - will hold truvada for now  - monitor with daily CMP

## 2024-09-13 NOTE — ASSESSMENT & PLAN NOTE
Cortes Schroeder is a 64 y.o. male with PMH of DM, HTN  and right trimalleolar ankle fracture status post ex fix on 07/18 with subsequent definitive fixation on 07/26 admitted with right ankle wound dehiscence in the setting of uncontrolled diabetes.      He is s/p I&D of R ankle 09/06. Repeat I&D R medial ankle 09/09. Pending angiography with vascular once Cr improves.     VS: vitals improved today   Labs: Worsening DEEPALI - Cr 3.0   Diet: ok for diet, vascular following for angio   Pain control: multimodal regimen  PT/OT:  NWB RLE   DVT PPx: Lovenox 30 renally dosed   Abx:  oxacillin continuous; ID following   Cultures:  ankle cx staph +      Dispo: appreciate plastics and vascular recs, pending angiogram once Cr improves

## 2024-09-13 NOTE — SUBJECTIVE & OBJECTIVE
Past Medical History:   Diagnosis Date    Anxiety 12/18/2012    Back pain 12/18/2012    Cataract     Chronic pain syndrome 04/24/2016    Diabetes type 2, controlled 02/20/2016    Diabetic retinopathy     DM (diabetes mellitus) 12/18/2012    DM (diabetes mellitus), type 2, uncontrolled 11/16/2013    Gastroesophageal reflux disease without esophagitis 02/20/2016    Hyperlipidemia 12/18/2012    Insomnia 08/07/2014    Neuropathy 11/16/2013       Past Surgical History:   Procedure Laterality Date    APPLICATION OF WOUND VACUUM-ASSISTED CLOSURE DEVICE Right 9/6/2024    Procedure: APPLICATION, WOUND VAC;  Surgeon: Moe Liz MD;  Location: [facility] OR [address] [place] [address];  Service: Orthopedics;  Laterality: Right;    APPLICATION, EXTERNAL FIXATION DEVICE, FOR ANKLE FRACTURE Right 7/18/2024    Procedure: APPLICATION, EXTERNAL FIXATION DEVICE, FOR ANKLE FRACTURE;  Surgeon: Moe Liz MD;  [facility];  Service: Orthopedics;  Laterality: Right;    ARTHROTOMY OF ANKLE Right 9/9/2024    Procedure: ARTHROTOMY, ANKLE;  Surgeon: Moe Liz MD;  [facility];  Service: Orthopedics;  Laterality: Right;    BACK SURGERY  2003    Lumbar Spine    CATARACT EXTRACTION      CLOSED REDUCTION, FRACTURE, ANKLE, TRIMALLEOLAR Right 7/18/2024    Procedure: CLOSED REDUCTION, FRACTURE, ANKLE, TRIMALLEOLAR;  Surgeon: Moe Liz MD;  Location: [facility] OR [address] [place] [address];  Service: Orthopedics;  Laterality: Right;    EPIDURAL STEROID INJECTION N/A 1/16/2019    Procedure: Injection, Steroid, Epidural Cervical;  Surgeon: Andrew Sinclair Jr., MD;  Location: [facility] ENDO;  Service: Pain Management;  Laterality: N/A;  Cervical Epidural Steroid Injection C7-T1    25950    Arrive @ 1240    EPIDURAL STEROID INJECTION N/A 2/20/2019    Procedure: Injection, Steroid, Epidural;  Surgeon: Andrew Sinclair Jr., MD;  Location: [facility] ENDO;  Service: Pain Management;  Laterality: N/A;  Lumbar Epidural Steroid Injection  L4-5    17395    Arrive @ 1215 (requests latest time)    FIXATION OF SYNDESMOSIS OF ANKLE Right 7/26/2024    Procedure: FIXATION, SYNDESMOSIS, ANKLE;  Surgeon: Moe Liz MD;  Location: [facility] OR [address] [place] [address];  Service: Orthopedics;  Laterality: Right;    INJECTION, SPINE, LUMBOSACRAL, TRANSFORAMINAL APPROACH Bilateral 6/14/2024    Procedure: Bilateral L5 Transforaminal Epidural Steroid Injections;  Surgeon: Andrew Sinclair Jr., MD;  Location: [facility]IN [facility];  Service: Pain Management;  Laterality: Bilateral;  @1300(given)  ASA last 6/8  Check BG  MD Sign.    IRRIGATION AND DEBRIDEMENT Right 9/6/2024    Procedure: IRRIGATION AND DEBRIDEMENT;  Surgeon: Moe Liz MD;  Location: [address];  Service: Orthopedics;  Laterality: Right;    IRRIGATION AND DEBRIDEMENT OF LOWER EXTREMITY Right 9/9/2024    Procedure: IRRIGATION AND DEBRIDEMENT, LOWER EXTREMITY - WITH WOUND VAC CHANGE, RIGHT ANKLE;  Surgeon: Moe Liz MD;  Location: [address];  Service: Orthopedics;  Laterality: Right;  WITH WOUND VAC CHANGE, RIGHT ANKLE    OPEN REDUCTION AND INTERNAL FIXATION (ORIF) OF FRACTURE OF DISTAL RADIUS Left 7/19/2024    Procedure: ORIF, FRACTURE, RADIUS, DISTAL - LEFT;  Surgeon: Moe Liz MD;  Location: [address];  Service: Orthopedics;  Laterality: Left;  LEFT, SYNTHES, C ARM    OPEN REDUCTION AND INTERNAL FIXATION (ORIF) OF INJURY OF ANKLE Right 7/26/2024    Procedure: ORIF, ANKLE;  Surgeon: Moe Liz MD;  Location: [address];  Service: Orthopedics;  Laterality: Right;    REMOVAL OF EXTERNAL FIXATION DEVICE Right 7/26/2024    Procedure: REMOVAL, EXTERNAL FIXATION DEVICE;  Surgeon: Moe Liz MD;  Location: [address];  Service: Orthopedics;  Laterality: Right;       Review of patient's allergies indicates:   Allergen Reactions    Invokana [canagliflozin] Anaphylaxis    Percocet [oxycodone-acetaminophen] Nausea Only and Hallucinations    Biaxin  [clarithromycin]     Hydrocodone Other (See Comments)     Dizzy/nausea/hallucinations    Sulfa (sulfonamide antibiotics) Nausea Only and Rash     Current Facility-Administered Medications   Medication Frequency    acetaminophen tablet 500 mg Q8H PRN    albuterol-ipratropium 2.5 mg-0.5 mg/3 mL nebulizer solution 3 mL Q4H PRN    bisacodyL suppository 10 mg Once    calcium carbonate 200 mg calcium (500 mg) chewable tablet 1,000 mg TID PRN    dextrose 10% bolus 125 mL 125 mL PRN    dextrose 10% bolus 125 mL 125 mL PRN    dextrose 10% bolus 250 mL 250 mL PRN    dextrose 10% bolus 250 mL 250 mL PRN    doxycycline tablet 100 mg Q12H    enoxaparin injection 30 mg Daily    glucagon (human recombinant) injection 1 mg PRN    glucose chewable tablet 16 g PRN    glucose chewable tablet 24 g PRN    guaiFENesin 100 mg/5 ml syrup 400 mg Q6H    hydrALAZINE tablet 25 mg Q8H PRN    insulin aspart U-100 pen 0-10 Units Q4H PRN    insulin glargine U-100 (Lantus) pen 18 Units QHS    morphine injection 2 mg Q6H PRN    morphine injection 2 mg Once    morphine injection 4 mg Q6H PRN    nortriptyline capsule 50 mg Nightly    ondansetron disintegrating tablet 8 mg Q8H PRN    oxacillin 12 g in  mL CONTINUOUS INFUSION Q24H    pantoprazole EC tablet 40 mg Daily    polyethylene glycol packet 17 g BID    senna tablet 8.6 mg BID    sodium bicarbonate tablet 650 mg TID     Family History       Problem Relation (Age of Onset)    Alzheimer's disease Father    Cataracts Father    Heart attack Mother    Hypertension Father, Mother    Migraines Mother    Parkinsonism Father          Tobacco Use    Smoking status: Never    Smokeless tobacco: Never   Substance and Sexual Activity    Alcohol use: Yes     Alcohol/week: 1.0 standard drink of alcohol     Types: 1 Glasses of wine per week     Comment: occasionally    Drug use: No    Sexual activity: Not Currently     Partners: Male     Birth control/protection: Condom     Comment: 10/2/17      Review  of Systems   Respiratory:  Negative for cough, chest tightness and shortness of breath.    Cardiovascular:  Negative for chest pain, palpitations and leg swelling.   Gastrointestinal:  Negative for abdominal pain, diarrhea, nausea and vomiting.   Musculoskeletal:  Positive for myalgias. Negative for arthralgias, back pain and joint swelling.     Objective:     Vital Signs (Most Recent):  Temp: 98.6 °F (37 °C) (09/13/24 1057)  Pulse: 103 (09/13/24 1057)  Resp: 18 (09/13/24 1057)  BP: 135/62 (09/13/24 1057)  SpO2: 95 % (09/13/24 1057) Vital Signs (24h Range):  Temp:  [98.3 °F (36.8 °C)-100 °F (37.8 °C)] 98.6 °F (37 °C)  Pulse:  [] 103  Resp:  [18-25] 18  SpO2:  [95 %-98 %] 95 %  BP: (127-179)/(62-85) 135/62     Weight: 93 kg (205 lb 0.4 oz) (09/06/24 2032)  Body mass index is 34.12 kg/m².  Body surface area is 2.07 meters squared.    I/O last 3 completed shifts:  In: -   Out: 1380 [Urine:1380]     Physical Exam  Constitutional:       General: He is not in acute distress.     Appearance: Normal appearance. He is not ill-appearing.      Interventions: Nasal cannula in place.   HENT:      Head: Normocephalic.      Right Ear: External ear normal.      Left Ear: External ear normal.      Mouth/Throat:      Mouth: Mucous membranes are moist.      Pharynx: Oropharynx is clear.   Eyes:      General: No scleral icterus.     Extraocular Movements: Extraocular movements intact.   Cardiovascular:      Rate and Rhythm: Normal rate and regular rhythm.      Pulses: Normal pulses.      Heart sounds: Normal heart sounds. No murmur heard.     No friction rub. No gallop.   Pulmonary:      Effort: Accessory muscle usage present. No respiratory distress.      Breath sounds: No decreased air movement. Rales present.   Abdominal:      General: Abdomen is flat. Bowel sounds are normal. There is no distension.      Palpations: Abdomen is soft.      Tenderness: There is no abdominal tenderness.   Musculoskeletal:         General: No  swelling.      Right lower leg: No edema.      Left lower leg: No edema.      Comments: R Distal Leg wrapped in ace bandage.    Skin:     General: Skin is warm.      Coloration: Skin is not jaundiced.      Findings: No erythema or rash.   Neurological:      General: No focal deficit present.      Mental Status: He is alert and oriented to person, place, and time. Mental status is at baseline.   Psychiatric:         Mood and Affect: Mood normal.         Behavior: Behavior normal.         Thought Content: Thought content normal.          Significant Labs:  All labs within the past 24 hours have been reviewed.    Significant Imaging:  Labs: Reviewed

## 2024-09-13 NOTE — ASSESSMENT & PLAN NOTE
This patient does have evidence of infective focus  My overall impression is sepsis.  Source: Skin and Soft Tissue (location ankle)  Antibiotics given-   Antibiotics (72h ago, onward)      Start     Stop Route Frequency Ordered    09/11/24 2100  doxycycline tablet 100 mg         -- Oral Every 12 hours 09/11/24 1223    09/09/24 1730  oxacillin 12 g in  mL CONTINUOUS INFUSION         -- IV Every 24 hours (non-standard times) 09/09/24 1630          Latest lactate reviewed-  Recent Labs   Lab 09/12/24  1618 09/12/24  2216   LACTATE 0.6  --    POCLAC  --  <0.30*       Organ dysfunction indicated by Acute kidney injury and Encephalopathy    Fluid challenge Actual Body weight- Patient will receive 30ml/kg actual body weight to calculate fluid bolus for treatment of septic shock.     Post- resuscitation assessment No - Post resuscitation assessment not needed     Will Not start Pressors-   Source control achieved by: see above  -secondary to surgical wound infeection in right ankle, with 9/6 wound Cx with group B strep and staph with bacteremia with 9/7 blood with staph with 9/8 and 9/10 blood Cx NGTD  ID following. Echo without signs of vegtations. On oxacillin now. Wbc still higher at 22, and ID following, procal 2 on 9/12-->1 on 9/13. CRp improved to 235. Wbc starting to improve at 18 now. . Will f/u ID recs further.

## 2024-09-13 NOTE — ASSESSMENT & PLAN NOTE
Baseline Cr 1.0-1.2, Cr 1.7 on admission  DDx: Pre-renal 2/2 IVVD d/t decreased PO intake vs Contrast-induced nephropathy vs ATN in the setting of sepsis    Plan:  - Discontinue contiunous normal saline infusion  - F/u urine sample under microscopy  - Urine studies consistent with pre-renal DEEPALI  - Retroperitoneal U/S not concerning for hydronephrosis  - Trend Cr/ Serial BMPs  - Strict I&Os, close monitoring of UOP  - Replace electrolytes PRN, goal K/Phos/Mg 4/3/2  - Avoid nephrotoxic agents when feasible (NSAIDs, ACEi/ARB, IV radiocontrast, gadolinium, etc.)  - Avoid Fleet's and Brown Bomb Enemas given their propensity to induce hypermagnesemia  - Renally dose all meds to eGFR  - Goal MAP > 65 mmHg  - No acute indications for RRT at this time

## 2024-09-13 NOTE — ASSESSMENT & PLAN NOTE
Hyponatremia is likely due to Dehydration/hypovolemia. The patient's most recent sodium results are listed below.  Recent Labs     09/11/24  1041 09/12/24  0538 09/13/24  0244   * 131* 131*       Plan  - Correct the sodium by 4-6mEq in 24 hours.   - Obtain the following studies: Urine sodium, urine osmolality, serum osmolality.  - Will treat the hyponatremia with IV fluids as follows: NS  - Monitor sodium Daily.   - Patient hyponatremia is similar to prev day, osm low normal ranges, urine osm pending  Last admit had similar low and improved with volume

## 2024-09-13 NOTE — NURSING
Nurses Note -- 4 Eyes      9/12/2024   8:13 PM      Skin assessed during: Daily Assessment      [x] No Altered Skin Integrity Present    []Prevention Measures Documented      [] Yes- Altered Skin Integrity Present or Discovered   [] LDA Added if Not in Epic (Describe Wound)   [] New Altered Skin Integrity was Present on Admit and Documented in LDA   [] Wound Image Taken    Wound Care Consulted? No    Attending Nurse:  Luciana Hoffmann RN/Staff Member:   NIEVES Mathias

## 2024-09-13 NOTE — CARE UPDATE
.RAPID RESPONSE NURSE PROACTIVE ROUNDING NOTE       Time of Visit:      Admit Date: 2024  LOS: 6  Code Status: Full Code   Date of Visit: 2024  : 1960  Age: 64 y.o.  Sex: male  Race: White  Bed: 2/[facility] A:   MRN: [id number]  Was the patient discharged from an ICU this admission? No   Was the patient discharged from a PACU within last 24 hours? No   Did the patient receive conscious sedation/general anesthesia in last 24 hours? No  Was the patient in the ED within the past 24 hours? No  Was the patient on NIPPV within the past 24 hours? No   Attending Physician: Maria Del Rosario Medina MD  Primary Service: [facility]   Time spent at the bedside: 30 - 45 min    SITUATION    Notified by charge RN via phone call.  Reason for alert: AMS   Called to evaluate the patient for Neuro.    BACKGROUND     Why is the patient in the hospital?: Surgical wound breakdown    Patient has a past medical history of Anxiety, Back pain, Cataract, Chronic pain syndrome, Diabetes type 2, controlled, Diabetic retinopathy, DM (diabetes mellitus), DM (diabetes mellitus), type 2, uncontrolled, Gastroesophageal reflux disease without esophagitis, Hyperlipidemia, Insomnia, and Neuropathy.    Last Vitals:  Temp: 99.8 °F (37.7 °C) (2159)  Pulse: 96 (2226)  Resp: 18 (1934)  BP: 136/72 (2226)  SpO2: 97 % (2227)    24 Hours Vitals Range:  Temp:  [98.4 °F (36.9 °C)-100.1 °F (37.8 °C)]   Pulse:  []   Resp:  [18-45]   BP: (127-180)/(61-85)   SpO2:  [94 %-100 %]     Labs:  Recent Labs     09/10/24  0439 09/11/24  1041 24  0537 24  2215   WBC 20.10* 21.86* 22.35*  --    HGB 8.4* 9.0* 8.5*  --    HCT 25.9* 27.1* 26.0* 22*    482* 520*  --        Recent Labs     09/10/24  0439 09/11/24  1041 24  0538   * 132* 131*   K 3.9 3.4* 3.7    104 103   CO2 21* 18* 17*   BUN 22 30* 31*   CREATININE 1.6* 2.1* 2.6*   * 113* 56*   PHOS 4.1  --   --    MG 2.2  --  2.4         Recent Labs     09/12/24  1620 09/12/24  2215   PH 7.365 7.357   PCO2 31.9* 31.8*   PO2 89 113*   HCO3 18.2* 17.8*   POCSATURATED 97 98   BE -7* -8*        ASSESSMENT      Physical Exam  Constitutional:       Appearance: He is obese.      Interventions: Nasal cannula in place.   Cardiovascular:      Rate and Rhythm: Normal rate and regular rhythm.      Pulses:           Radial pulses are 2+ on the right side and 2+ on the left side.   Pulmonary:      Effort: Tachypnea and accessory muscle usage present.   Abdominal:      General: There is distension.   Feet:      Comments: RLE ace wrapped, wound vac in place.   Skin:     Coloration: Skin is pale.   Neurological:      Mental Status: He is lethargic.      GCS: GCS eye subscore is 3. GCS verbal subscore is 5. GCS motor subscore is 6.      Motor: Weakness and tremor present.         INTERVENTIONS    The patient was seen for Neurological problem. Staff concerns included decreased responsiveness. The following interventions were performed: POCT glucose, D10 bolus 250 ml IV bolus, POCT arterial blood gas , continuous pulse ox monitoring continued , continuous cardiac monitoring , and POCT ABG with lytes and lactic, chest x-ray, and q4 hour glucose checks.    NP Cristal Ash notified orders placed for nephro consult, renal ultrasound, decrease continuous fluids to 75 ml/hr, KUB, and additional urine studies ordered.     RECOMMENDATIONS    POCT glucose q4 hours, follow up with x-ray and ultrasound results, monitor urine output, and close monitoring of respiratory and mental status. Appreciate nephrology recommendations.     PROVIDER ESCALATION    Yes/No  Yes    Orders received and case discussed with  Cristal Ash NP.    Disposition: Remain in room 542.    FOLLOW-UP    charge Ariana PRITCHARD  updated on plan of care. Instructed to call the Rapid Response Nurse, Tonya Luke RN at [phone] for additional questions or concerns.

## 2024-09-13 NOTE — ASSESSMENT & PLAN NOTE
Endocrinology consulted for BG management.   BG goal 140-180    - Lantus (Insulin Glargine 18 units nightly  (re-WBD at 0.4 u/kg/day given fasting BG trending below goal today) (HOLD if BG < 100 prior to bed given hx of hypoglycemia)   - D/c scheduled novolog as patient's diet was changed to sugar free bariatric clears.  - [facility] SSI (150/25)  - BG checks q4hr  - Hypoglycemia protocol in place    ** Please notify Endocrine for any change and/or advance in diet**  ** Please call Endocrine for any BG related issues **    Discharge Planning:   TBD. Please notify endocrinology prior to discharge.

## 2024-09-13 NOTE — ASSESSMENT & PLAN NOTE
Seen on UA after delvalle for retention, per ID hold on teratment now as unclear if symptoms, reports 1 day of urine burning this week  -will f/u cx

## 2024-09-13 NOTE — PROGRESS NOTES
[facility]  Progress Note    Patient Name: Cotres Schroeder  MRN: [id number]  Patient Class: IP- Inpatient   Admission Date: 9/6/2024  Length of Stay: 7 days  Attending Physician: Maria Del Rosario Medina MD  Primary Care Provider: Andrew Sinclair Jr., MD        Subjective:     Principal Problem:Surgical wound breakdown        HPI:  Cortes Schroeder is a 64 y.o. M with PMHx of anxiety, T2DM, GERD, HLD, HTN who presented to ED for AMS. He had a fall in July that resulted in R trimalleolar fracture and L distal radius fracture. He had external fixation on 07/18 and then ORIF on 07/26 with Dr. Liz. He was prescribed clinda 300 mg on 08/28 for a ten day course. Patient was doing well when he acutely became altered and not acting like himself a few hours ago. Patient family member drove him here from [facility] for ortho consult. R medial ankle wound dehisced with redness and swelling around it. No CPM fever, cough, N/V/D or seizure.    In ED: T max 99.1. SIRS 2/4 with WBC 18 and . CMP notable for Na 127, Cr 1.7, . Phos 1.9. .3. BNP 73. Trop neg. A1c 8.5. R tib fib XR notes There are 2 abandoned anterior-posteriorly oriented pin tracks in the right mid tibial shaft.  On AP views, each of these pin tracks demonstrates a small faint internal density, possibly representing a sequestrum. Ortho and endocrine consulted. Given IV vanc and IV clindamycin. Given 2.7L IVFs. Admitted to hospital medicine for sepsis 2/2 infected hardware.     Overview/Hospital Course:  Cortes Schroeder is a 63 yo M admitted to hospital medicine for sepsis 2/2 R ankle infection s/p ORIF on 07/26. Started on IV vanc and cefepime. Given IVFs. Brought to OR with ortho on 09/06 for irrigation debridement of RLE. Endocrine following for BG. Was on vanc and zosyn. Echo without concern for vegetations. 2/2 Blood cultures and 2/2 repeats with Staph aureus. Wound cultures with Staph aureus and Group B  strep. Will follow cultures. ID consulted as suspect patient will need long course of abx; now changing to continuous oxacillin. CTA chest negative for PE, but notes small area of ground glass changes to RUL. Patient now with cough. Adding doxy for possible PNA. Ortho repeated I&D on 09/09. WBC remains elevated, but fever has resolved. Plastics consulted for flap eval. CTA RLE with moderate atherosclerosis and multifocal high grade stenosis throughout R calf arteries. Vascular surgery consulted per plastics recs as they would like to pre-optimiize blood flow prior to any free flap or pedicled propellar flap. RD consulted for malnutrition. Now with DEEPALI, but likely 2/2 infection and multiple images requiring contrast. Will need PT/OT consult prior to discharge.     Interval History: he had IVF decreased overnight after night NP Thad and rapid team/ICU NP discussed case further and had close monitoring overnight and thad dawson messaged me this morning to discuss overnight events to update me. This morning he was much improved in regards to mental status from yesterday afternoon and suspect was sedation medications given for aborted angiogram that caused rapid change in mental status. He is near his typical baseline from previous admit (not completely there but similar more than yesterday am also) and able to have full conversation with me. No stopping for dypsnea and speaking in full sentences and logical and fluent in conversation. On initial presentaiton he was using his IS when I walked into room without prompting on his own volition. He reports his mouth feels very dry and when drinking water he had coughign episode and some concerns for possible aspiration and speech consulted for this. On CT had no signs of major pneumonia or volume overloadb ut some chronic tracheomalachia signs, secretions in esophagus, possible GERD etiology also. He reports being told he does snore very loudly long term but hasn't been  "told he stops breathing overnight and isnt aware of a history of "floppy airway" or ever had intuations/prolonaged intubation or any breathing issues as a child/intubation as a child, etc. Having some clear/white sputum and feels like "congestion breaking up" in his chest he said, change dmucinex from pill to liquid form today. He reports he did have 1 day thsi week where he did note a good bit or burnign with urination but did not remember yesterday at all when he was retainign urine. He remmebers feeling like he was "on a ship and given injection through the IV, 2 of them, and then not wanting to listen and not knowing what was going on" and then next thing he knew he was back in his room. Suspect describing experience in angiogram area as that is when he was repoted to get confused very quickly after injectino of sedatino pushes. He doesn't remmeber being on floor and confused after that but he does remember seeing me yesterday morning but not throughout afternoon.   CT head without any abnromalities and CT chest/abd/pelvis with known previous calcifications in pancrease and tracheomlachia, hyperinflation of lungs. Reviweed results with him. Some gaseous distension seen oin colon and will giev suppository to help promote as had issues with constipation last admit that requried multiple suppositories as to not get behind as he did last admit again that far.  Procal improving to 1.4. lactate/ammonia/CK wnl yesterady. Full urine studies not collected yesterady as ordered once delvalle placed, UA with bactereuai, discussed with ID who recs holding off on broadening antibiotics for now and watchign CX data. AST 80 as had been up/down. Wbc improving at 18. Cr higher at 3.0 from 2.6. renal consutled overnight, US with medical renal disease. Gallbladder on imaging with some distension but no wall thickening noted. Tachycardia improved to 90s from 110s with fluids yesterday. Renal consulted overnight and will f/u recs " further today. Had 2 IV contrast loads earlier this week for CTAs so may be a possible contribtuor as well as retention seen before delvalle placement yesterday as well as some signs of subjective dehydration with dry mouth on exam with concentraed urine with sedimnent seen in delvalle tubing this morning and dark urine/very concentrated per nursing when placed yestserday.   Angiogram today cancelled and on hold until cr better.   Hypoglycemia overnight and endo adjusting insulin. On bariatric clears for now until speech evaluates further today.      Review of Systems   Constitutional:  Positive for activity change and fatigue. Negative for chills and fever.   Respiratory:  Positive for cough. Negative for chest tightness and shortness of breath.    Cardiovascular:  Negative for chest pain and leg swelling.   Gastrointestinal:  Positive for nausea. Negative for abdominal pain.   Musculoskeletal:  Positive for arthralgias.   Skin:  Positive for color change and wound.   Neurological:  Negative for dizziness and weakness.     Objective:     Vital Signs (Most Recent):  Temp: 98.3 °F (36.8 °C) (09/13/24 0734)  Pulse: 99 (09/13/24 0734)  Resp: 20 (09/13/24 0734)  BP: (!) 149/69 (09/13/24 0734)  SpO2: 97 % (09/13/24 0734) Vital Signs (24h Range):  Temp:  [98.3 °F (36.8 °C)-100 °F (37.8 °C)] 98.3 °F (36.8 °C)  Pulse:  [] 99  Resp:  [18-45] 20  SpO2:  [95 %-100 %] 97 %  BP: (127-180)/(64-85) 149/69     Weight: 93 kg (205 lb 0.4 oz)  Body mass index is 34.12 kg/m².    Intake/Output Summary (Last 24 hours) at 9/13/2024 1041  Last data filed at 9/13/2024 0456  Gross per 24 hour   Intake --   Output 680 ml   Net -680 ml         Physical Exam  Vitals and nursing note reviewed.   Constitutional:       Appearance: He is well-developed. He is obese. He is ill-appearing.      Comments: Confusion improved. Closer to baseline, much better from yesterday PM. On oxygen   Eyes:      Pupils: Pupils are equal, round, and reactive to light.    Cardiovascular:      Rate and Rhythm: Regular rhythm. Tachycardia present.      Comments: HR 90s  Pulmonary:      Effort: Pulmonary effort is normal.      Breath sounds: Rales (bibasilar) present.      Comments: On NC. Speaking in full sentenecs without stopping for dyspnea. No crackles. Small amoutns of yellow/white sputum in tissue. Cough on exam with drinking water.  Abdominal:      Palpations: Abdomen is soft.      Tenderness: There is no abdominal tenderness.   Musculoskeletal:         General: No tenderness.      Comments: Bandages in place. Wound vac with dark red output.   Skin:     General: Skin is warm and dry.      Comments: C/d/I dressing to R ankle with wound vac.    Neurological:      Mental Status: He is alert and oriented to person, place, and time.      Comments: Close to mental status baseline, much improved   Psychiatric:         Behavior: Behavior normal.             Significant Labs: All pertinent labs within the past 24 hours have been reviewed.  CBC:   Recent Labs   Lab 09/12/24  0537 09/12/24  2215 09/13/24  0244   WBC 22.35*  --  18.63*   HGB 8.5*  --  7.9*   HCT 26.0* 22* 23.6*   *  --  484*     CMP:   Recent Labs   Lab 09/12/24  0538 09/13/24  0244   * 131*   K 3.7 4.0    105   CO2 17* 16*   GLU 56* 79   BUN 31* 37*   CREATININE 2.6* 3.0*   CALCIUM 7.9* 7.7*   PROT 6.5 5.9*   ALBUMIN 1.4* 1.2*   BILITOT 0.4 0.4   ALKPHOS 484* 494*   AST 96* 80*   ALT 9* 7*   ANIONGAP 11 10       Significant Imaging: I have reviewed all pertinent imaging results/findings within the past 24 hours.    Assessment/Plan:      * Surgical wound breakdown  Closed trimalleolar fracture of right ankle s/p ORIF in July  63 yo M presenting with AMS and worsening redness, swelling and pain to R ankle.     - continue oxacillin  - Ortho consulted:   - taken to OR 09/06 for debridement of surgical wound with wound vac placed. CX with group B strep and staph.   - Repeat I&D of R medial ankle on  09/09   - recommending plastics eval for free flap  Wound vac in place  - follow wound cultures -- Staph aureus and GBS  - follow blood cultures -- Staph aureus.   - ID following:  - Continue IV oxacillin 12 g q 24 hours continuous infusion.  Monitor liver enzymes.   - Anticipate 6 weeks of IV antibiotics from date of most recent washout.    - Once blood cxs remain cleared for 72hrs, will consider adding adjunctive rifampin (isolate is susceptible) rifampin for biofilm penetration if hardware is to remain.  - Anticipate long term oral  antibiotic suppression following IV abx   - Will follow.    - Plastic consulted:   - CTA reviewed, given numerous areas of high grade stenosis, will need Vascular consult for possible angiogram to pre-optimiize blood flow prior to any free flap or pedicled propellar flap. Angiogram on hold for DEEPALI  - continue optimization of BG, endocrinology on board  - ID on board, continue  IV abx. Current wbc remains elevated but starting to improve 9/13  - orthopedics on board to assess for source control, possible hardware removal.  - malnutition: will need optimization prior to OR. Prealbumin and transferrin low on last check.   - Plan: after medically optimized, plan for flap closure of wound.   - Vascular surgery consulted; appreciate recs . Angiogram aborted 9/12 for mental status as well as 9/13 for DEEPALI. Will replan once Cr improves.    Dysphagia  Coughing on exam witnesseed 9/13 with water and some secretions in airway on CT, speech eval placed      Constipation  Had issue last admit with prolonged constipation requiring enemas  -on bowel regimen  -has some bowel gas seen throughout CT abdomen, will giev suppository 9/13 to stay ahead      Bacteriuria  Seen on UA after delvalle for retention, per ID hold on teratment now as unclear if symptoms, reports 1 day of urine burning this week  -will f/u cx      Airway malacia  Seen on CT, unclear cause as no hx of prologned intubation, etc.  -continu  supportiv care, nebs, IS, humidified oxygen      Acute retention of urine  Required delvalle 9/12 for retenetion, per nursing had had issues on/off the last few adys and had immediate return of >300 cc once placed and very concentrated dark urine. Urien with sediment now and darker yellow on exam 9/13. Questionable infectious process with bacerueia, hold treatment for now per ID and monitor. DEEPALI, unclear cause if from retention, renal consulted, will f/u trends      Acute metabolic encephalopathy  Likely from meds versed, fentanyl for anioggram that was aborted on 9/12 due to change in mental status with poor clearance with DEEPALI as change in metnal status drastically after this was given 9/12, CT head wnl, ammonia/lactate, ck okay  Much improving mental status 9/13. Watch closely      Leukocytosis  Starting to improve 9/13      Sinus tachycardia  Suspect from volume down, IVF with improvement from 110s to 90s on 9/13      PAD (peripheral artery disease)  Workup with vascular now, THEO, high grade stenosis on CTA, possible aniogram pending cr improvement      Acute blood loss anemia  Anemia is likely due to acute blood loss which was from surgery . Most recent hemoglobin and hematocrit are listed below.  Recent Labs     09/10/24  0439 09/11/24  1041 09/12/24  0537   HGB 8.4* 9.0* 8.5*   HCT 25.9* 27.1* 26.0*     Plan  - Monitor serial CBC: Daily  - Transfuse PRBC if patient becomes hemodynamically unstable, symptomatic or H/H drops below 7/21.  - Patient has not received any PRBC transfusions to date  - Patient's anemia is currently stable  -     Cough  Covering doxy for URI, on mucinex, mostly dry but now with some scant white/clear sputum on 9./13      Transaminitis  - liver enzymes up and down, highest ast at 155, between   Now at 96-80  - will hold truvada for now  - monitor with daily CMP    On pre-exposure prophylaxis for HIV  - holding truvada for transaminitis     MSSA bacteremia  - 2/2 blood cx with Staph  aureus. 2/2 repeats positive. Repeats pending but NGTD  - Follow repeat blood cx  - ID following: start continuous oxacillin   - echo without concern for vegetations     Sepsis  This patient does have evidence of infective focus  My overall impression is sepsis.  Source: Skin and Soft Tissue (location ankle)  Antibiotics given-   Antibiotics (72h ago, onward)      Start     Stop Route Frequency Ordered    09/11/24 2100  doxycycline tablet 100 mg         -- Oral Every 12 hours 09/11/24 1223    09/09/24 1730  oxacillin 12 g in  mL CONTINUOUS INFUSION         -- IV Every 24 hours (non-standard times) 09/09/24 1630          Latest lactate reviewed-  Recent Labs   Lab 09/12/24  1618 09/12/24  2216   LACTATE 0.6  --    POCLAC  --  <0.30*       Organ dysfunction indicated by Acute kidney injury and Encephalopathy    Fluid challenge Actual Body weight- Patient will receive 30ml/kg actual body weight to calculate fluid bolus for treatment of septic shock.     Post- resuscitation assessment No - Post resuscitation assessment not needed     Will Not start Pressors-   Source control achieved by: see above  -secondary to surgical wound infeection in right ankle, with 9/6 wound Cx with group B strep and staph with bacteremia with 9/7 blood with staph with 9/8 and 9/10 blood Cx NGTD  ID following. Echo without signs of vegtations. On oxacillin now. Wbc still higher at 22, and ID following, procal 2 on 9/12-->1 on 9/13. CRp improved to 235. Wbc starting to improve at 18 now. . Will f/u ID recs further.    Hyponatremia  Hyponatremia is likely due to Dehydration/hypovolemia. The patient's most recent sodium results are listed below.  Recent Labs     09/11/24  1041 09/12/24  0538 09/13/24  0244   * 131* 131*       Plan  - Correct the sodium by 4-6mEq in 24 hours.   - Obtain the following studies: Urine sodium, urine osmolality, serum osmolality.  - Will treat the hyponatremia with IV fluids as follows: NS  - Monitor sodium  Daily.   - Patient hyponatremia is similar to prev day, osm low normal ranges, urine osm pending  Last admit had similar low and improved with volume    Uncontrolled diabetes mellitus with hyperglycemia  Patient's FSGs are not controlled on current hypoglycemics.   Last A1c reviewed-   Lab Results   Component Value Date    LABA1C 10.8 (H) 08/15/2016    HGBA1C 8.5 (H) 09/06/2024     Most recent fingerstick glucose reviewed-   Recent Labs   Lab 09/12/24  2244 09/13/24  0452 09/13/24  0722 09/13/24  1059   POCTGLUCOSE 109 88 107 137*       Current correctional scale  Low  Maintain anti-hyperglycemic dose as follows-   Antihyperglycemics (From admission, onward)      Start     Stop Route Frequency Ordered    09/13/24 2100  insulin glargine U-100 (Lantus) pen 18 Units         -- SubQ Nightly 09/13/24 0730    09/13/24 0830  insulin aspart U-100 pen 0-10 Units         -- SubQ Every 4 hours PRN 09/13/24 0731           - Endocrine consulted:  - At this time, recommend that the patient remain off of his pump since he cannot be controlled in the Auto mode. Patient does not interact with pump frequently and relies on the auto mode algorithm.   - Lantus (Insulin Glargine) 35   - Novolog (Insulin Aspart) 9 units TIDWM (20% decrease given post-prandial BG below goal) (Hold if NPO) and prn for BG excursions MDC SSI (150/25)   - hold oral antihyperglycemics during hospitalization   - needs better BG control for optimal wound healing   Doses decreased on 9/12 for hypoglycemia this AM with endo managing and hypoglycemia again 9/13 in early AM overnight prior and endo adjusting further. On bariatric clear until speech eval    Closed trimalleolar fracture of right ankle  S/p ORIF 07/2024  Ortho consulted   - see surgical wound breakdown above  NWB    DEEPALI (acute kidney injury)  DEEPALI is likely due to pre-renal azotemia due to dehydration. Baseline creatinine is  1 . Most recent creatinine and eGFR are listed below.  Recent Labs      09/10/24  0439 09/11/24  1041 09/12/24  0538   CREATININE 1.6* 2.1* 2.6*   EGFRNORACEVR 48.1* 34.7* 26.9*        Plan  - DEEPALI is worsening. Will adjust treatment as follows: give additional fluids   - Avoid nephrotoxins and renally dose meds for GFR listed above  - Monitor urine output, serial BMP, and adjust therapy as needed  - baseline 1.6--2.1--2.6 now. EF on prev echo 70% so signs of volume depletion, as well as hyponatremia and tachycardia. Resume IVF 9/12 and urine and Na studies.    Acute hypoxemic respiratory failure  Patient with Hypoxic Respiratory failure which is Acute.  he is not on home oxygen. Supplemental oxygen was provided and noted-      .   Signs/symptoms of respiratory failure include- tachypnea and lethargy. Contributing diagnoses includes - Obesity Hypoventilation and Pneumonia Labs and images were reviewed. Patient Has not had a recent ABG. Will treat underlying causes and adjust management of respiratory failure as follows-     - ABG on 9/12 without major abnormality, P02 89, [id number], continue oxygen now, CT chest showing tracheomalachia/hyperinflation lungs. Has no hx of excessive intubation or lung dx known to him. Using IS on exam 9/13 and speaking in full sentences on exam, with small amount of clear/white sputum. No obvious signs of volume overload, some cough on exam, will f/u renal recs re- IVF as dec overnight with discsusion between crit care/night NP with DEEPALI still present. Wean as tolerated  - repeat CXR without acute process  - CTA ordered by ortho/anesthesia prior to I&D. Negative for PE, but small ground glass area to RUL representing infectious vs. Inflammatory process.  - doxy added for pneumonia coverage.  - mucinex BID, acapella, and nebulized saline . Has IS at bedside on exam 9/12  - prn duo nebs   9/8: no signs of overload. Left Ventricle: The left ventricle is normal in size. Normal wall thickness. There is normal systolic function with a visually estimated ejection  "fraction of 65 - 70%. There is normal diastolic function.    Right Ventricle: Normal right ventricular cavity size. Wall thickness is normal. Systolic function is normal.    IVC/SVC: Normal venous pressure at 3 mmHg.    No evidence of intracardiac mass or vegetation.    Metabolic acidosis  Likely secondary to DEEPALI  Start Na bicarb on 9/12      Uncontrolled type 2 diabetes mellitus with diabetic polyneuropathy, with long-term current use of insulin  Patient's FSGs are uncontrolled due to hyperglycemia on current medication regimen.  Last A1c reviewed-   Lab Results   Component Value Date    LABA1C 10.8 (H) 08/15/2016    HGBA1C 9.9 (H) 07/18/2024     Most recent fingerstick glucose reviewed- No results for input(s): "POCTGLUCOSE" in the last 24 hours.  Current correctional scale  Low  Maintain anti-hyperglycemic dose as follows-   Antihyperglycemics (From admission, onward)      Start     Stop Route Frequency Ordered    09/06/24 0506  insulin aspart U-100 pen 0-5 Units         -- SubQ Every 6 hours PRN 09/06/24 0406          Hold Oral hypoglycemics while patient is in the hospital.      VTE Risk Mitigation (From admission, onward)           Ordered     enoxaparin injection 30 mg  Daily         09/13/24 0757     IP VTE HIGH RISK PATIENT  Once         09/06/24 1735     Place sequential compression device  Until discontinued         09/06/24 1249                    Discharge Planning   JAYLEEN: 9/16/2024     Code Status: Full Code   Is the patient medically ready for discharge?:     Reason for patient still in hospital (select all that apply): Patient trending condition  Discharge Plan A: Home Health                  Maria Del Rosario Medina MD  Department of Hospital Medicine   [facility] - Surgery    "

## 2024-09-13 NOTE — ASSESSMENT & PLAN NOTE
Workup with vascular now, THEO, high grade stenosis on CTA, possible aniogram pending cr improvement

## 2024-09-13 NOTE — ASSESSMENT & PLAN NOTE
Patient with Hypoxic Respiratory failure which is Acute.  he is not on home oxygen. Supplemental oxygen was provided and noted-      .   Signs/symptoms of respiratory failure include- tachypnea and lethargy. Contributing diagnoses includes - Obesity Hypoventilation and Pneumonia Labs and images were reviewed. Patient Has not had a recent ABG. Will treat underlying causes and adjust management of respiratory failure as follows-     - ABG on 9/12 without major abnormality, P02 89, [id number], continue oxygen now, CT chest showing tracheomalachia/hyperinflation lungs. Has no hx of excessive intubation or lung dx known to him. Using IS on exam 9/13 and speaking in full sentences on exam, with small amount of clear/white sputum. No obvious signs of volume overload, some cough on exam, will f/u renal recs re- IVF as dec overnight with discsusion between crit care/night NP with DEEPALI still present. Wean as tolerated  - repeat CXR without acute process  - CTA ordered by ortho/anesthesia prior to I&D. Negative for PE, but small ground glass area to RUL representing infectious vs. Inflammatory process.  - doxy added for pneumonia coverage.  - mucinex BID, acapella, and nebulized saline . Has IS at bedside on exam 9/12  - prn duo nebs   9/8: no signs of overload. Left Ventricle: The left ventricle is normal in size. Normal wall thickness. There is normal systolic function with a visually estimated ejection fraction of 65 - 70%. There is normal diastolic function.    Right Ventricle: Normal right ventricular cavity size. Wall thickness is normal. Systolic function is normal.    IVC/SVC: Normal venous pressure at 3 mmHg.    No evidence of intracardiac mass or vegetation.

## 2024-09-14 PROBLEM — E83.39 HYPERPHOSPHATEMIA: Status: ACTIVE | Noted: 2024-09-14

## 2024-09-14 LAB
ALBUMIN SERPL BCP-MCNC: 1.2 G/DL (ref 3.5–5.2)
ALP SERPL-CCNC: 413 U/L (ref 55–135)
ALT SERPL W/O P-5'-P-CCNC: 7 U/L (ref 10–44)
ANION GAP SERPL CALC-SCNC: 9 MMOL/L (ref 8–16)
AST SERPL-CCNC: 49 U/L (ref 10–40)
BILIRUB SERPL-MCNC: 0.4 MG/DL (ref 0.1–1)
BUN SERPL-MCNC: 46 MG/DL (ref 8–23)
CALCIUM SERPL-MCNC: 8 MG/DL (ref 8.7–10.5)
CHLORIDE SERPL-SCNC: 105 MMOL/L (ref 95–110)
CO2 SERPL-SCNC: 17 MMOL/L (ref 23–29)
CREAT SERPL-MCNC: 3.3 MG/DL (ref 0.5–1.4)
ERYTHROCYTE [DISTWIDTH] IN BLOOD BY AUTOMATED COUNT: 16.1 % (ref 11.5–14.5)
EST. GFR  (NO RACE VARIABLE): 20.2 ML/MIN/1.73 M^2
GLUCOSE SERPL-MCNC: 154 MG/DL (ref 70–110)
HCT VFR BLD AUTO: 24.4 % (ref 40–54)
HGB BLD-MCNC: 8.3 G/DL (ref 14–18)
MAGNESIUM SERPL-MCNC: 2.5 MG/DL (ref 1.6–2.6)
MCH RBC QN AUTO: 28.5 PG (ref 27–31)
MCHC RBC AUTO-ENTMCNC: 34 G/DL (ref 32–36)
MCV RBC AUTO: 84 FL (ref 82–98)
PHOSPHATE SERPL-MCNC: 6.2 MG/DL (ref 2.7–4.5)
PLATELET # BLD AUTO: 590 K/UL (ref 150–450)
PMV BLD AUTO: 9.8 FL (ref 9.2–12.9)
POCT GLUCOSE: 129 MG/DL (ref 70–110)
POCT GLUCOSE: 137 MG/DL (ref 70–110)
POCT GLUCOSE: 153 MG/DL (ref 70–110)
POCT GLUCOSE: 206 MG/DL (ref 70–110)
POTASSIUM SERPL-SCNC: 4.1 MMOL/L (ref 3.5–5.1)
PROT SERPL-MCNC: 6 G/DL (ref 6–8.4)
RBC # BLD AUTO: 2.91 M/UL (ref 4.6–6.2)
SODIUM SERPL-SCNC: 131 MMOL/L (ref 136–145)
WBC # BLD AUTO: 18.7 K/UL (ref 3.9–12.7)

## 2024-09-14 PROCEDURE — 25000003 PHARM REV CODE 250: Performed by: PHYSICIAN ASSISTANT

## 2024-09-14 PROCEDURE — 94640 AIRWAY INHALATION TREATMENT: CPT

## 2024-09-14 PROCEDURE — 94761 N-INVAS EAR/PLS OXIMETRY MLT: CPT

## 2024-09-14 PROCEDURE — 85027 COMPLETE CBC AUTOMATED: CPT | Performed by: HOSPITALIST

## 2024-09-14 PROCEDURE — 25000003 PHARM REV CODE 250

## 2024-09-14 PROCEDURE — 94664 DEMO&/EVAL PT USE INHALER: CPT

## 2024-09-14 PROCEDURE — 25000003 PHARM REV CODE 250: Performed by: HOSPITALIST

## 2024-09-14 PROCEDURE — 99232 SBSQ HOSP IP/OBS MODERATE 35: CPT | Mod: ,,,

## 2024-09-14 PROCEDURE — 99900035 HC TECH TIME PER 15 MIN (STAT)

## 2024-09-14 PROCEDURE — 84100 ASSAY OF PHOSPHORUS: CPT | Performed by: HOSPITALIST

## 2024-09-14 PROCEDURE — 25000003 PHARM REV CODE 250: Performed by: STUDENT IN AN ORGANIZED HEALTH CARE EDUCATION/TRAINING PROGRAM

## 2024-09-14 PROCEDURE — 99233 SBSQ HOSP IP/OBS HIGH 50: CPT | Mod: ,,, | Performed by: NURSE PRACTITIONER

## 2024-09-14 PROCEDURE — 27000221 HC OXYGEN, UP TO 24 HOURS

## 2024-09-14 PROCEDURE — 63600175 PHARM REV CODE 636 W HCPCS: Performed by: HOSPITALIST

## 2024-09-14 PROCEDURE — 63600175 PHARM REV CODE 636 W HCPCS: Performed by: STUDENT IN AN ORGANIZED HEALTH CARE EDUCATION/TRAINING PROGRAM

## 2024-09-14 PROCEDURE — 80053 COMPREHEN METABOLIC PANEL: CPT | Performed by: HOSPITALIST

## 2024-09-14 PROCEDURE — 27000646 HC AEROBIKA DEVICE

## 2024-09-14 PROCEDURE — 83735 ASSAY OF MAGNESIUM: CPT

## 2024-09-14 PROCEDURE — 99233 SBSQ HOSP IP/OBS HIGH 50: CPT | Mod: ,,, | Performed by: INTERNAL MEDICINE

## 2024-09-14 PROCEDURE — 21400001 HC TELEMETRY ROOM

## 2024-09-14 RX ORDER — METHOCARBAMOL 500 MG/1
500 TABLET, FILM COATED ORAL 3 TIMES DAILY PRN
Status: DISCONTINUED | OUTPATIENT
Start: 2024-09-14 | End: 2024-10-02

## 2024-09-14 RX ORDER — SEVELAMER CARBONATE 800 MG/1
800 TABLET, FILM COATED ORAL
Status: DISCONTINUED | OUTPATIENT
Start: 2024-09-14 | End: 2024-09-16

## 2024-09-14 RX ADMIN — METHOCARBAMOL 500 MG: 500 TABLET ORAL at 08:09

## 2024-09-14 RX ADMIN — INSULIN GLARGINE 18 UNITS: 100 INJECTION, SOLUTION SUBCUTANEOUS at 09:09

## 2024-09-14 RX ADMIN — INSULIN ASPART 2 UNITS: 100 INJECTION, SOLUTION INTRAVENOUS; SUBCUTANEOUS at 09:09

## 2024-09-14 RX ADMIN — SODIUM BICARBONATE 650 MG TABLET 650 MG: at 02:09

## 2024-09-14 RX ADMIN — PANTOPRAZOLE SODIUM 40 MG: 40 TABLET, DELAYED RELEASE ORAL at 09:09

## 2024-09-14 RX ADMIN — GUAIFENESIN 400 MG: 200 SOLUTION ORAL at 05:09

## 2024-09-14 RX ADMIN — SODIUM BICARBONATE 650 MG TABLET 650 MG: at 08:09

## 2024-09-14 RX ADMIN — GUAIFENESIN 400 MG: 200 SOLUTION ORAL at 11:09

## 2024-09-14 RX ADMIN — OXACILLIN 12 G: 2 INJECTION, POWDER, FOR SOLUTION INTRAMUSCULAR; INTRAVENOUS at 08:09

## 2024-09-14 RX ADMIN — METHOCARBAMOL 500 MG: 500 TABLET ORAL at 09:09

## 2024-09-14 RX ADMIN — NORTRIPTYLINE HYDROCHLORIDE 50 MG: 25 CAPSULE ORAL at 08:09

## 2024-09-14 RX ADMIN — GUAIFENESIN 400 MG: 200 SOLUTION ORAL at 06:09

## 2024-09-14 RX ADMIN — SENNOSIDES 8.6 MG: 8.6 TABLET, FILM COATED ORAL at 09:09

## 2024-09-14 RX ADMIN — ENOXAPARIN SODIUM 30 MG: 30 INJECTION SUBCUTANEOUS at 06:09

## 2024-09-14 RX ADMIN — POLYETHYLENE GLYCOL 3350 17 G: 17 POWDER, FOR SOLUTION ORAL at 09:09

## 2024-09-14 RX ADMIN — SEVELAMER CARBONATE 800 MG: 800 TABLET, FILM COATED ORAL at 11:09

## 2024-09-14 RX ADMIN — SODIUM BICARBONATE 650 MG TABLET 650 MG: at 09:09

## 2024-09-14 RX ADMIN — ACETAMINOPHEN 500 MG: 500 TABLET ORAL at 08:09

## 2024-09-14 RX ADMIN — SEVELAMER CARBONATE 800 MG: 800 TABLET, FILM COATED ORAL at 06:09

## 2024-09-14 NOTE — ASSESSMENT & PLAN NOTE
DEEPALI is likely due to pre-renal azotemia due to dehydration. Baseline creatinine is  1 . Most recent creatinine and eGFR are listed below.  Recent Labs     09/12/24  0538 09/13/24  0244 09/14/24  0337   CREATININE 2.6* 3.0* 3.3*   EGFRNORACEVR 26.9* 22.6* 20.2*        Plan  - DEEPALI is worsening  - Avoid nephrotoxins and renally dose meds for GFR listed above  - Monitor urine output, serial BMP, and adjust therapy as needed  - baseline 1.6--2.1--2.6 now. EF on prev echo 70% so signs of volume depletion, as well as hyponatremia and tachycardia. Resume IVF 9/12   Renal consulted, rec stop IVF 9/13, ATN on urine microscopy with casts, secondary to sepsis/contrast likely combination and cr not at plateau yet with Cr 3.3 on 9/14 and supportive care in interim.

## 2024-09-14 NOTE — SUBJECTIVE & OBJECTIVE
Interval History: he reports doing okay overnight. Small amount of clear sputum has resolved. No increased RR or signs of dyspnea on exam when talking today, on low flow oxygen. Discussed with Jurgen from ID, stopping doxy given no signs of PNA on CT and may be contributor to esphageal irritation given had globus sensation in esophagus/chest area with speech yesterday reported. He is doing okay with liquids, he reports he hopes its something today besides just beef broth for meals. Cr 3.3 and a tick higher as renal reports ATN signs on spinning urine and likely from combination of sepsis vs from contrast and discussed with him at length and that likely will hit plateau before improvement and hopefully nearing plateau soon. Phos elevated from this and binders started today. Liver enzymes slowly improving with ast 49. BM reported yesterday. 800 cc urine output in [place]valle. Wbc still 18, broad work up unrevealing for other sources, messaged dr walsh with ortho to see thoughts on if needs another look at ankle given had necrosis of tissue on last op note without obvious purulence but concern may need to assess again from ortho standpoint to ensure nidus for elevated wbc not coming from tissue source here and he reports will discuss with dr velez this weekend regarding this. Angiogram on hold for now until improved Cr and discussed with him at length for reasons for this in regards to long term planning with plastics/ortho. His roommate hugo came to visit yesterday and says he is coming this weekend again he thinks to visit.        Review of Systems   Constitutional:  Positive for activity change and fatigue. Negative for chills and fever.   Respiratory:  Positive for cough. Negative for chest tightness and shortness of breath.    Cardiovascular:  Negative for chest pain and leg swelling.   Gastrointestinal:  Positive for nausea. Negative for abdominal pain.   Musculoskeletal:  Positive for arthralgias.   Skin:  Positive for  color change and wound.   Neurological:  Negative for dizziness and weakness.     Objective:     Vital Signs (Most Recent):  Temp: 98.1 °F (36.7 °C) (09/14/24 1034)  Pulse: 92 (09/14/24 1052)  Resp: 18 (09/14/24 1034)  BP: (!) 160/72 (09/14/24 1034)  SpO2: 99 % (09/14/24 1034) Vital Signs (24h Range):  Temp:  [97.1 °F (36.2 °C)-98.6 °F (37 °C)] 98.1 °F (36.7 °C)  Pulse:  [] 92  Resp:  [16-20] 18  SpO2:  [94 %-99 %] 99 %  BP: (131-170)/(66-82) 160/72     Weight: 93 kg (205 lb 0.4 oz)  Body mass index is 34.12 kg/m².    Intake/Output Summary (Last 24 hours) at 9/14/2024 1123  Last data filed at 9/14/2024 1020  Gross per 24 hour   Intake --   Output 1600 ml   Net -1600 ml         Physical Exam  Vitals and nursing note reviewed.   Constitutional:       Appearance: He is well-developed. He is obese. He is ill-appearing.      Comments: At baseline mental status without return of confusion. On oxygen   Eyes:      Pupils: Pupils are equal, round, and reactive to light.   Cardiovascular:      Rate and Rhythm: Regular rhythm. Tachycardia present.      Comments: HR 90s  Pulmonary:      Effort: Pulmonary effort is normal.      Breath sounds: Rales (bibasilar) present.      Comments: On NC. Speaking in full sentenecs without stopping for dyspnea. No crackles. No increased RR on exam. Breathing comfortably  Abdominal:      Palpations: Abdomen is soft.      Tenderness: There is no abdominal tenderness.   Musculoskeletal:         General: No tenderness.      Comments: Bandages in place. Wound vac with dark red output.   Skin:     General: Skin is warm and dry.      Comments: C/d/I dressing to R ankle with wound vac.    Neurological:      Mental Status: He is alert and oriented to person, place, and time.      Comments: Close to mental status baseline, much improved   Psychiatric:         Behavior: Behavior normal.             Significant Labs: All pertinent labs within the past 24 hours have been reviewed.  CBC:   Recent Labs    Lab 09/12/24  2215 09/13/24 0244 09/14/24 0337   WBC  --  18.63* 18.70*   HGB  --  7.9* 8.3*   HCT 22* 23.6* 24.4*   PLT  --  484* 590*     CMP:   Recent Labs   Lab 09/13/24 0244 09/14/24 0337   * 131*   K 4.0 4.1    105   CO2 16* 17*   GLU 79 154*   BUN 37* 46*   CREATININE 3.0* 3.3*   CALCIUM 7.7* 8.0*   PROT 5.9* 6.0   ALBUMIN 1.2* 1.2*   BILITOT 0.4 0.4   ALKPHOS 494* 413*   AST 80* 49*   ALT 7* 7*   ANIONGAP 10 9       Significant Imaging: I have reviewed all pertinent imaging results/findings within the past 24 hours.

## 2024-09-14 NOTE — PROGRESS NOTES
[facility]  Nephrology  Progress Note    Date of Admit: 9/6/2024      Interval history Patient seen and examined by myself at bedside, feels fine. On O2 supplement.    Past Medical History:  Past Medical History:   Diagnosis Date    Anxiety 12/18/2012    Back pain 12/18/2012    Cataract     Chronic pain syndrome 04/24/2016    Diabetes type 2, controlled 02/20/2016    Diabetic retinopathy     DM (diabetes mellitus) 12/18/2012    DM (diabetes mellitus), type 2, uncontrolled 11/16/2013    Gastroesophageal reflux disease without esophagitis 02/20/2016    Hyperlipidemia 12/18/2012    Insomnia 08/07/2014    Neuropathy 11/16/2013       Past Surgical History:  Past Surgical History:   Procedure Laterality Date    APPLICATION OF WOUND VACUUM-ASSISTED CLOSURE DEVICE Right 9/6/2024    Procedure: APPLICATION, WOUND VAC;  Surgeon: Moe Liz MD;  Location: [facility] OR [address] [place] [address];  Service: Orthopedics;  Laterality: Right;    APPLICATION, EXTERNAL FIXATION DEVICE, FOR ANKLE FRACTURE Right 7/18/2024    Procedure: APPLICATION, EXTERNAL FIXATION DEVICE, FOR ANKLE FRACTURE;  Surgeon: Moe Liz MD;  Location: [facility] OR [address] [place] [address];  Service: Orthopedics;  Laterality: Right;    ARTHROTOMY OF ANKLE Right 9/9/2024    Procedure: ARTHROTOMY, ANKLE;  Surgeon: Moe Liz MD;  Location: [facility] OR [address] [place] [address];  Service: Orthopedics;  Laterality: Right;    BACK SURGERY  2003    Lumbar Spine    CATARACT EXTRACTION      CLOSED REDUCTION, FRACTURE, ANKLE, TRIMALLEOLAR Right 7/18/2024    Procedure: CLOSED REDUCTION, FRACTURE, ANKLE, TRIMALLEOLAR;  Surgeon: Moe Liz MD;  Location: [address];  Service: Orthopedics;  Laterality: Right;    EPIDURAL STEROID INJECTION N/A 1/16/2019    Procedure: Injection, Steroid, Epidural Cervical;  Surgeon: Andrew Sinclair Jr., MD;  Location: [facility];  Service: Pain Management;  Laterality: N/A;  Cervical Epidural Steroid Injection C7-T1    88469    Arrive @ [facility]     EPIDURAL STEROID INJECTION N/A 2/20/2019    Procedure: Injection, Steroid, Epidural;  Surgeon: Andrew Sinclair Jr., MD;  Location: [facility] ENDO;  Service: Pain Management;  Laterality: N/A;  Lumbar Epidural Steroid Injection L4-5    31642    Arrive @ 1215 (requests latest time)    FIXATION OF SYNDESMOSIS OF ANKLE Right 7/26/2024    Procedure: FIXATION, SYNDESMOSIS, ANKLE;  Surgeon: Moe Liz MD;  Location: [facility] OR [facility] FLR;  Service: Orthopedics;  Laterality: Right;    INJECTION, SPINE, LUMBOSACRAL, TRANSFORAMINAL APPROACH Bilateral 6/14/2024    Procedure: Bilateral L5 Transforaminal Epidural Steroid Injections;  Surgeon: Andrew Sinclair Jr., MD;  Location: [facility] PAIN MANAGEMENT;  Service: Pain Management;  Laterality: Bilateral;  @1300(given)  ASA last 6/8  Check BG  MD Sign.    IRRIGATION AND DEBRIDEMENT Right 9/6/2024    Procedure: IRRIGATION AND DEBRIDEMENT;  Surgeon: Moe Liz MD;  [facility];  Service: Orthopedics;  Laterality: Right;    IRRIGATION AND DEBRIDEMENT OF LOWER EXTREMITY Right 9/9/2024    Procedure: IRRIGATION AND DEBRIDEMENT, LOWER EXTREMITY - WITH WOUND VAC CHANGE, RIGHT ANKLE;  Surgeon: Moe Liz MD;  Location: [facility] OR [facility] FLR;  Service: Orthopedics;  Laterality: Right;  WITH WOUND VAC CHANGE, RIGHT ANKLE    OPEN REDUCTION AND INTERNAL FIXATION (ORIF) OF FRACTURE OF DISTAL RADIUS Left 7/19/2024    Procedure: ORIF, FRACTURE, RADIUS, DISTAL - LEFT;  Surgeon: Moe Liz MD;  Location: [facility] OR [facility] FLR;  Service: Orthopedics;  Laterality: Left;  LEFT, SYNTHES, C ARM    OPEN REDUCTION AND INTERNAL FIXATION (ORIF) OF INJURY OF ANKLE Right 7/26/2024    Procedure: ORIF, ANKLE;  Surgeon: oMe Liz MD;  Location: [facility] OR [facility] FLR;  Service: Orthopedics;  Laterality: Right;    REMOVAL OF EXTERNAL FIXATION DEVICE Right 7/26/2024    Procedure: REMOVAL, EXTERNAL FIXATION DEVICE;  Surgeon: Moe Liz MD;  Location: [facility] OR [facility] FLR;  Service:  Orthopedics;  Laterality: Right;       Allergies:  Review of patient's allergies indicates:   Allergen Reactions    Invokana [canagliflozin] Anaphylaxis    Percocet [oxycodone-acetaminophen] Nausea Only and Hallucinations    Biaxin [clarithromycin]     Hydrocodone Other (See Comments)     Dizzy/nausea/hallucinations    Sulfa (sulfonamide antibiotics) Nausea Only and Rash       Home Medications:  Prior to Admission medications    Medication Sig Start Date End Date Taking? Authorizing Provider   acetaminophen (TYLENOL) 325 MG tablet Take 2 tablets (650 mg total) by mouth every 6 (six) hours. 7/23/24   Maria Del Rosario Medina MD   aspirin (ECOTRIN) 81 MG EC tablet Take 1 tablet (81 mg total) by mouth 2 (two) times a day. End date sept 20, 2024 7/23/24 7/23/25  Maria Del Rosario Medina MD   atorvastatin (LIPITOR) 40 MG tablet Take 40 mg by mouth once daily.    Provider, Historical   blood-glucose sensor (DEXCOM G6 SENSOR) Omaira Change sensor every 10 days 1/20/21 1/20/22  Tavares Williamson MD   blood-glucose transmitter (DEXCOM G6 TRANSMITTER) Omaira Change transmitter every 3 months 1/20/21 1/20/22  Tavares Williamson MD   celecoxib (CELEBREX) 200 MG capsule Take 1 capsule (200 mg total) by mouth once daily. 7/23/24   Maria Del Rosario Medina MD   cyanocobalamin (VITAMIN B-12) 1000 MCG tablet Take 1,000 mcg by mouth once daily.    Provider, Historical   DULoxetine (CYMBALTA) 60 MG capsule Take 1 capsule (60 mg total) by mouth once daily. 11/30/22   Don Artis MD   emtricitabine-tenofovir 200-300 mg (TRUVADA) 200-300 mg Tab Take 1 tablet by mouth once daily. 2/8/23   Don Artis MD   enoxaparin (LOVENOX) 40 mg/0.4 mL Syrg Inject 40 mg into the skin Daily.    Provider, Historical   fish oil-omega-3 fatty acids 300-1,000 mg capsule Take 1 capsule by mouth 2 (two) times daily.    Provider, Historical   gabapentin (NEURONTIN) 300 MG capsule Take 2 capsules (600 mg total) by mouth once daily AND 4 capsules (1,200 mg total) every  evening. 7/23/24   Maria Del Rosario Medina MD   HUMALOG U-100 INSULIN 100 unit/mL injection 1:20 U PRN high blood sugar and snacks. Correction dose- Enter carb coverage intake upon administration  Target number 120 Carbohydrate coverage #1 1:2 Carbohydrate coverage #1 time [id number] Carbohydrate coverage #2 1:3 Carbohydrate coverage #2 time [id number] Carbohydrate coverage #3 Carbohydrate coverage #3 time Carbohydrate coverage #4 Carbohydrate coverage #4 time Sensitivity #1 1:20 Sensitivity #1 time 5186-8020 Sensitivity #2 Sensitivity #2 time Sensitivity #3 Sensitivity #3 time Sensitivity #4 Sensitivity #4 time Sensitivity #5 Sensitivity #5 time Order Questions Question Answer Comment       50 U SQ continuous  Target number 120 Basal Rate #1 2.3 Basal rate #1 time 1006-0104 Basal Rate #2 1.55 Basal rate #2 time 1771-7578 Basal rate #3 Basal rate #3 time Basal rate #4 Basal rate #4 time Basal rate #5 Basal rate #5 time 7/23/24   Maria Del Rosario Medina MD   losartan (COZAAR) 25 MG tablet Take 1 tablet (25 mg total) by mouth once daily. 7/23/24 7/23/25  Maria Del Rosario Medina MD   magnesium oxide (MAG-OX) 400 mg (241.3 mg magnesium) tablet Take 0.5 tablets (200 mg total) by mouth once daily. 7/23/24   Maria Del Rosario Medina MD   melatonin (MELATIN) 3 mg tablet Take 2 tablets (6 mg total) by mouth nightly as needed for Insomnia. 7/23/24   Maria Del Rosario Medina MD   methocarbamoL (ROBAXIN) 500 MG Tab Take 1 tablet (500 mg total) by mouth 4 (four) times daily as needed (pain scale 4-7). 7/23/24   Maria Del Rosario Medina MD   morphine (MSIR) 15 MG tablet Take 1 tablet (15 mg total) by mouth every 4 (four) hours as needed (pain scale 6-10). 7/23/24   Maria Del Rosario Medina MD MOUNJARO 10 mg/0.5 mL PnIj Inject 10 mg into the skin once a week. HOLD until all surgeries completed and out of rehab 7/23/24   Maria Del Rosario Medina MD   nortriptyline (PAMELOR) 50 MG capsule Take 1 capsule (50 mg total) by mouth nightly. 11/30/22   Don Artis  MD BHAVNA   ondansetron (ZOFRAN-ODT) 4 MG TbDL Take 2 tablets (8 mg total) by mouth every 6 (six) hours as needed (nausea). 24   Maria Del Rosario Medina MD   polyethylene glycol (GLYCOLAX) 17 gram PwPk Take 17 g by mouth once daily. 24   Maria Del Rosario Medina MD   rosuvastatin (CRESTOR) 10 MG tablet Take 10 mg by mouth every evening.    Provider, Historical   senna-docusate 8.6-50 mg (PERICOLACE) 8.6-50 mg per tablet Take 1 tablet by mouth 2 (two) times daily. 24   Maria Del Rosario Medina MD   vitamin D 1000 units Tab Take 1,000 Units by mouth 2 (two) times daily.    Provider, Historical   insulin aspart U-100 (NOVOLOG U-100 INSULIN ASPART) 100 unit/mL injection Use in insulin pump. Max daily dose 150 units. 9/15/20 9/18/20  Violetta Woods, PRECIOUS       Family History:  Family History   Problem Relation Name Age of Onset    Cataracts Father      Hypertension Father      Parkinsonism Father      Alzheimer's disease Father      Migraines Mother      Hypertension Mother      Heart attack Mother      Amblyopia Neg Hx      Blindness Neg Hx      Glaucoma Neg Hx      Macular degeneration Neg Hx      Retinal detachment Neg Hx      Strabismus Neg Hx         Social History:  Social History     Tobacco Use    Smoking status: Never    Smokeless tobacco: Never   Substance Use Topics    Alcohol use: Yes     Alcohol/week: 1.0 standard drink of alcohol     Types: 1 Glasses of wine per week     Comment: occasionally    Drug use: No       Review of Systems:  Pertinent positives and negatives are listed in HPI.  All other systems are reviewed and are negative.     Objective:   Last 24 Hour Vital Signs:  BP  Min: 131/82  Max: 170/78  Temp  Av.1 °F (36.7 °C)  Min: 97.1 °F (36.2 °C)  Max: 98.6 °F (37 °C)  Pulse  Av.3  Min: 90  Max: 109  Resp  Av.5  Min: 16  Max: 20  SpO2  Av %  Min: 94 %  Max: 99 %  Body mass index is 34.12 kg/m².  I/O last 3 completed shifts:  In: -   Out: 1430 [Urine:1430]    Physical  Examination:  Physical Exam  Constitutional:       General: He is not in acute distress.     Appearance: Normal appearance. He is obese.   HENT:      Head: Normocephalic and atraumatic.      Nose: Nose normal.      Mouth/Throat:      Mouth: Mucous membranes are moist.      Pharynx: Oropharynx is clear. No oropharyngeal exudate or posterior oropharyngeal erythema.   Eyes:      Extraocular Movements: Extraocular movements intact.      Conjunctiva/sclera: Conjunctivae normal.      Pupils: Pupils are equal, round, and reactive to light.   Cardiovascular:      Rate and Rhythm: Normal rate and regular rhythm.      Pulses: Normal pulses.      Heart sounds: Normal heart sounds. No murmur heard.     No friction rub. No gallop.   Pulmonary:      Effort: Pulmonary effort is normal. No respiratory distress.      Breath sounds: Normal breath sounds. No stridor. No wheezing or rales.   Abdominal:      General: Abdomen is flat. Bowel sounds are normal.      Palpations: Abdomen is soft.      Tenderness: There is no abdominal tenderness. There is no guarding or rebound.   Musculoskeletal:         General: No swelling. Normal range of motion.      Cervical back: Normal range of motion and neck supple.      Right lower leg: No edema.      Left lower leg: No edema.   Skin:     General: Skin is warm.      Coloration: Skin is not jaundiced or pale.      Findings: No lesion.   Neurological:      General: No focal deficit present.      Mental Status: He is alert and oriented to person, place, and time.      Motor: No weakness.   Psychiatric:         Mood and Affect: Mood normal.           Laboratory:  Most Recent Data:  CBC:   Lab Results   Component Value Date    WBC 18.70 (H) 09/14/2024    HGB 8.3 (L) 09/14/2024    HCT 24.4 (L) 09/14/2024     (H) 09/14/2024    MCV 84 09/14/2024    RDW 16.1 (H) 09/14/2024     BMP:   Lab Results   Component Value Date     (L) 09/14/2024    K 4.1 09/14/2024     09/14/2024    CO2 17 (L)  "09/14/2024    BUN 46 (H) 09/14/2024    CREATININE 3.3 (H) 09/14/2024     (H) 09/14/2024    CALCIUM 8.0 (L) 09/14/2024    MG 2.5 09/14/2024    PHOS 6.2 (H) 09/14/2024     LFTs:   Lab Results   Component Value Date    PROT 6.0 09/14/2024    ALBUMIN 1.2 (L) 09/14/2024    BILITOT 0.4 09/14/2024    AST 49 (H) 09/14/2024    ALKPHOS 413 (H) 09/14/2024    ALT 7 (L) 09/14/2024     Coags:   Lab Results   Component Value Date    INR 1.0 09/06/2024     Urinalysis:   Lab Results   Component Value Date    LABURIN No growth 09/12/2024    COLORU Yellow 09/12/2024    SPECGRAV >1.030 (A) 09/12/2024    NITRITE Negative 09/12/2024    GLUCOSEU 3+ (A) 10/27/2015    KETONESU 1+ (A) 09/12/2024    UROBILINOGEN neg 12/15/2020    BILIRUBINUR NEGATIVE 10/27/2015    WBCUA 31 (H) 09/12/2024       Trended Lab Data:  Recent Labs   Lab 09/12/24  0537 09/12/24  0538 09/12/24  2215 09/13/24  0244 09/14/24  0337   WBC 22.35*  --   --  18.63* 18.70*   HGB 8.5*  --   --  7.9* 8.3*   HCT 26.0*  --  22* 23.6* 24.4*   *  --   --  484* 590*   MCV 86  --   --  85 84   RDW 16.0*  --   --  15.7* 16.1*   NA  --  131*  --  131* 131*   K  --  3.7  --  4.0 4.1   CL  --  103  --  105 105   CO2  --  17*  --  16* 17*   BUN  --  31*  --  37* 46*   CREATININE  --  2.6*  --  3.0* 3.3*   GLU  --  56*  --  79 154*   PROT  --  6.5  --  5.9* 6.0   ALBUMIN  --  1.4*  --  1.2* 1.2*   BILITOT  --  0.4  --  0.4 0.4   AST  --  96*  --  80* 49*   ALKPHOS  --  484*  --  494* 413*   ALT  --  9*  --  7* 7*       Trended Cardiac Data:  No results for input(s): "TROPONINI", "CKTOTAL", "CKMB", "BNP" in the last 168 hours.           Assessment and Plan:      #DEEPALI: thought to be 2/2 ATN caused by sepsis, hemodynamic instability; contrast exposure on 9/9 and 9/10 also noted  - today No Indication for KRT, but will continue to observe since ATN tends to get worse before it gets better  -per  Urine micro showed abundant granular casts compatible with ATN  -renal US " reviewed by me No hydronephrosis or acute finding.  -Avoid nephrotoxins, keep MAP > 65, strict I/O, trend renal serum creatinine, electrolytes and UO.

## 2024-09-14 NOTE — PROGRESS NOTES
[facility] - Surgery  Infectious Disease  Progress Note    Patient Name: Cortes Schroeder  MRN: [id number]  Admission Date: 9/6/2024  Length of Stay: 7 days  Attending Physician: Maria Del Rosario Medina MD  Primary Care Provider: Andrew Sinclair Jr., MD    Isolation Status: No active isolations  Assessment/Plan:        ID  Surgical site infection    64 year old with poorly controlled DM (A1C 8.5), recent fall resulting in left wrist fx and right ankle fracture 07/18/24 s/p ex fix Rt ankle and ORIF left wrist 07/19, and later subsequent ORIF right ankle on 7/26/24 with Dr. Liz -- now c/b deep surgical site infection of right ankle involving underlying hardware with associated bacteremia.        S/p I&D on 09/06 and 9/9.  Op report noting purulence overlying hardware.  No plans for hardware removal at this time.  Unable to achieve primary closure and Plastic Surgery consulted.   A CTA of  lower extremity showed multifocal high grade stenosis throughout right calf, though no occlusion and Vascular Surgery consulted. Right TBI .41. Attempted angiogram yesterday, but developed extreme confusion/AMS with versed, rapid respiratory rate with versed/fentanyl and procedure postponed.   Further plans for angiogram deferred for now given DEEPALI (Nephrology consulted)  Plastics recommendations are pending Vascular workup.      Surgical cxs +GBS, MSSA.  Blood cultures 9/6 and 9/7 + for MSSA.  Repeat blood cultures 9/8, 9/11 NGTD.  2D echo negative.   Remains on IV oxacillin for MSSA.  Afebrile.  WBC finally trending down today     Recommendations:  Continue IV oxacillin 12 g q 24 hours continuous infusion.  Continue to monitor liver enzymes.   Anticipate 6 weeks of IV antibiotics from date of most recent washout.    Will consider adding adjunctive rifampin for biofilm penetration if hardware is to remain - will wait to add until other issues resolved. Will discuss significance of potential interaction with truvada with ID  PharmD  Anticipate long term oral antibiotic suppression following IV abx so long as hardware remains.   Will follow    Pulmonary  Cough    Persistent non-productive cough, though some sputum production today.  CTA negative for PE.  CT chest yesterday showed no consolidation in lungs or evidence of septic emboli.  It was significant for tracheobronchial malacia and hypoaeration of lungs.  Also noted material throughout esophagus - reflux vs delayed motility.  Patient does c/o globus sensation. SLP consulted.   O2 sats 95-98% on 3 liters nasal cannula.  Currently on oral doxycycline.  Procalcitonin trending down     Recommendations:  No convincing evidence of PNA on imaging and given evidence of possible delayed esophageal motility, recommend discontinuing doxycycline which can cause esophageal irritation and monitoring closely   Aspiration precautions    Data reviewed and plan discussed with ID staff  Secure chat/Discussed above plan with Primary Team     Thank you.   Please Secure Chat for any questions or concerns.  ISELA Alexis, ANP-C  Infectious Disease     I spent a total of 60 minutes on the day of the visit.This includes face to face time and non-face to face time preparing to see the patient (eg, review of tests), obtaining and/or reviewing separately obtained history, documenting clinical information in the electronic or other health record, independently interpreting results and communicating results to the patient/family/caregiver, or care coordinator.     Subjective:     Principal Problem:Surgical wound breakdown    HPI: Cortes Schroeder is a 64 year old with HTN, poorly controlled DM (A1C 8.5), recent history of syncopal episodes, and  right ankle fracture on 7/18/24 s/p ex fix with subsequent ORIF on 7/26/24 with Dr. Liz.  At outpatient Orthopedic follow up surgical wound was healing and sutures removed.  Since then patient  reportedly doing well until about 1 week ago when he noticed some  drainage from his surgical incisions.  Over the past week he developed fevers, chills, and night sweats.  Yesterday he became confused/altered and was brought to ED.  In ED was hypotensive, tachycardic, WBC 18, .  Sepsis protocol initiated and started on clindamycin has been doing well postoperatively until around 1 week ago when he started noticing some drainage from his surgical incisions. He started to develop fevers, chills and night sweats over the past week. Yesterday afternoon, patient became altered and was brought to the ED by his roommate. Patient currently lives in [place]. Upon presentation to the ED, patient was tachycardic, hypotensive and afebrile. WBC of 18.68, .3. Sepsis protocol initiated. Patient was started on clindamycin and vancomycin.  Now on vancomycin and cefepime.       Orthopedics consulted.  Noted draining wound over the medial side of the right ankle as well as eschar formation over the lateral side of the ankle.  Right ankle and foot noted to be erythematous and edematous.  Pending surgical I&D today.     I have reviewed hospital notes from  HM service and other specialty providers. I have also reviewed CBC, CMP/BMP,  cultures and imaging with my interpretation as documented.      64M with poorly controlled DM (A1C 8.5), recent right ankle fracture 07/18/24 s/p ex fix with subsequent ORIF on 7/26/24 with Dr. Liz -- now c/b deep surgical site infection involving underlying hardware. Pt arrived S/p I&D 09/06. Op report noting purulence overlying hardware, cxs sent, gram stain +GPCs. Index bld cxs 09/06 +Staph aureus (BCID MRSA neg). Repeat bld cxs 09/07 NGTD. Post-op day 1 developed fever, with tachycardia 120, and ongoing AMS. Pt continuing on Iv-vanc and escalated cefepime to zosyn.    Ortho plans to return to OR Monday 09/09 for repeat I&D with Plastic surgery assistance for closure.    Recommendations / Plan:  Continue Iv-vanc and zosyn.   Will f/u repeat bld cxs  "09/07 to ensure cleared  Will f/u surgical cxs and adjust abx accordingly  Recommend TTE  Anticipate abx duration of 6wks s/p 09/09, DON 10/21.      -- Discussed with ID staff and primary team   -- ID will continue to follow w/ further recs.          Interval History:  Mental status back to baseline   Afebrile. WBC now trending down 22>18  CT C/A/P - no consolidation in lungs or evidence of septic emboli.  Significant for tracheobronchial malacia and hypoaeration of lungs.  Also noted material throughout esophagus - reflux vs delayed motility.    Patient does c/o globus sensation   SLP has evaluated. On clear liquids.   Still with cough - feels like congestion "breaking up"  Some clear/white sputum production  DEEPALI worsening.  Nephrology consulted  Angiogram deferred until improvement in renal function     Review of Systems   Respiratory:  Positive for cough.    Gastrointestinal:  Positive for nausea.   Musculoskeletal:  Positive for arthralgias and myalgias.   Skin:  Positive for color change and wound.   All other systems reviewed and are negative.    Objective:     Vital Signs (Most Recent):  Temp: 98.6 °F (37 °C) (09/13/24 1923)  Pulse: 97 (09/13/24 1923)  Resp: 16 (09/13/24 1923)  BP: (!) 154/70 (09/13/24 1923)  SpO2: 98 % (09/13/24 1923) Vital Signs (24h Range):  Temp:  [97.1 °F (36.2 °C)-98.9 °F (37.2 °C)] 98.6 °F (37 °C)  Pulse:  [] 97  Resp:  [16-20] 16  SpO2:  [95 %-98 %] 98 %  BP: (135-159)/(62-72) 154/70     Weight: 93 kg (205 lb 0.4 oz)  Body mass index is 34.12 kg/m².    Estimated Creatinine Clearance: 26.4 mL/min (A) (based on SCr of 3 mg/dL (H)).     Physical Exam  Vitals and nursing note reviewed.   Constitutional:       General: He is not in acute distress.     Appearance: Normal appearance. He is not ill-appearing, toxic-appearing or diaphoretic.   HENT:      Nose: No congestion.      Mouth/Throat:      Pharynx: No oropharyngeal exudate.   Eyes:      General: No scleral icterus.     " Conjunctiva/sclera: Conjunctivae normal.   Cardiovascular:      Rate and Rhythm: Normal rate and regular rhythm.      Heart sounds: No murmur heard.  Pulmonary:      Effort: Pulmonary effort is normal. No respiratory distress.      Breath sounds: No wheezing.      Comments: Crackles at bases.   Coughs with deep inspiration  O2 nasal cannula - O2 sats 95-98%    Musculoskeletal:      Cervical back: Neck supple. No tenderness.      Comments: Right ankle with surgical dressings in place + wound vac   Skin:     General: Skin is warm and dry.   Neurological:      Mental Status: He is alert. He is disoriented.   Psychiatric:         Mood and Affect: Mood normal.         Behavior: Behavior normal.        Significant Labs: Blood Culture:   Recent Labs   Lab 09/06/24  0235 09/07/24  0910 09/08/24  1641 09/10/24  1711   LABBLOO Gram stain aer bottle: Gram positive cocci in clusters resembling Staph  Gram stain alicia bottle: Gram positive cocci in clusters resembling Staph  Results called to and read back by:Luciana Altamirano RN 09/06/2024  20:52  STAPHYLOCOCCUS AUREUS*  Gram stain aer bottle: Gram positive cocci in clusters resembling Staph  Gram stain alicia bottle: Gram positive cocci in clusters resembling Staph  Results called to and read back by:Luciana Altamirano RN 09/06/2024  20:55  STAPHYLOCOCCUS AUREUS  For susceptibility see order #[id number]  * Gram stain aer bottle: Gram positive cocci in clusters resembling Staph  Results called to and read back by: Roseanne Guevara RN 09/08/2024  14:07  Gram stain alicia bottle: Gram positive cocci in clusters resembling Staph  Positive results previously called 09/08/2024  STAPHYLOCOCCUS AUREUS  [facility].  For susceptibility see order #[id number]  *  Gram stain aer bottle: Gram positive cocci in clusters resembling Staph  Gram stain alicia bottle: Gram positive cocci in clusters resembling Staph  Results called to and  read back by:Luciana Altamirano LPN 09/08/2024  03:59  STAPHYLOCOCCUS AUREUS  ID consult required at [facility].  For susceptibility see order #[id number]  * No growth after 5 days.  No growth after 5 days. No Growth to date  No Growth to date  No Growth to date  No Growth to date     CBC:   Recent Labs   Lab 09/12/24  0537 09/12/24  2215 09/13/24  0244   WBC 22.35*  --  18.63*   HGB 8.5*  --  7.9*   HCT 26.0* 22* 23.6*   *  --  484*     CMP:   Recent Labs   Lab 09/12/24  0538 09/13/24  0244   * 131*   K 3.7 4.0    105   CO2 17* 16*   GLU 56* 79   BUN 31* 37*   CREATININE 2.6* 3.0*   CALCIUM 7.9* 7.7*   PROT 6.5 5.9*   ALBUMIN 1.4* 1.2*   BILITOT 0.4 0.4   ALKPHOS 484* 494*   AST 96* 80*   ALT 9* 7*   ANIONGAP 11 10     Microbiology Results (last 7 days)       Procedure Component Value Units Date/Time    Blood culture [[id number]] Collected: 09/10/24 1711    Order Status: Completed Specimen: Blood Updated: 09/13/24 2012     Blood Culture, Routine No Growth to date      No Growth to date      No Growth to date      No Growth to date    Blood culture [[id number]] Collected: 09/08/24 1641    Order Status: Completed Specimen: Blood Updated: 09/13/24 2012     Blood Culture, Routine No growth after 5 days.    Narrative:      [id number]    Blood culture [[id number]] Collected: 09/08/24 1641    Order Status: Completed Specimen: Blood Updated: 09/13/24 2012     Blood Culture, Routine No growth after 5 days.    Narrative:      [id number]    Urine culture [[id number]] Collected: 09/12/24 1543    Order Status: No result Specimen: Urine Updated: 09/12/24 1655    Culture, Anaerobe [[id number]] Collected: 09/06/24 1446    Order Status: Completed Specimen: Incision site from Ankle, Right Updated: 09/12/24 1315     Anaerobic Culture No anaerobes isolated    Narrative:      Right ankle wound - culture    Blood culture x two cultures. Draw prior to antibiotics. [[id number]]  (Abnormal)   (Susceptibility) Collected: 09/06/24 0235    Order Status: Completed Specimen: Blood from Peripheral, Antecubital, Left Updated: 09/11/24 1203     Blood Culture, Routine Gram stain aer bottle: Gram positive cocci in clusters resembling Staph      Gram stain alicia bottle: Gram positive cocci in clusters resembling Staph      Results called to and read back by:Luciana Altamirano RN 09/06/2024      20:52      STAPHYLOCOCCUS AUREUS    Narrative:      Aerobic and anaerobic    Blood culture [[id number]]  (Abnormal) Collected: 09/07/24 0910    Order Status: Completed Specimen: Blood Updated: 09/10/24 1000     Blood Culture, Routine Gram stain aer bottle: Gram positive cocci in clusters resembling Staph      Results called to and read back by: Roseanne Guevara RN 09/08/2024  14:07      Gram stain alicia bottle: Gram positive cocci in clusters resembling Staph      Positive results previously called 09/08/2024      STAPHYLOCOCCUS AUREUS  ID consult required at [facility].  For susceptibility see order #[id number]      Blood culture [[id number]]  (Abnormal) Collected: 09/07/24 0910    Order Status: Completed Specimen: Blood Updated: 09/10/24 0959     Blood Culture, Routine Gram stain aer bottle: Gram positive cocci in clusters resembling Staph      Gram stain alicia bottle: Gram positive cocci in clusters resembling Staph      Results called to and read back by:Luciana Altamirano LPN 09/08/2024      03:59      STAPHYLOCOCCUS AUREUS  ID consult required at [facility].  For susceptibility see order #[id number]      AFB Culture & Smear [[id number]] Collected: 09/06/24 1446    Order Status: Completed Specimen: Incision site from Ankle, Right Updated: 09/09/24 1545     AFB Culture & Smear Culture in progress     AFB CULTURE STAIN No acid fast bacilli seen.    Narrative:      Right ankle wound - culture    Aerobic culture [[id number]]  (Abnormal)  (Susceptibility) Collected:  09/06/24 1446    Order Status: Completed Specimen: Incision site from Ankle, Right Updated: 09/09/24 1431     Aerobic Bacterial Culture STAPHYLOCOCCUS AUREUS  Many        STREPTOCOCCUS AGALACTIAE (GROUP B)  Many  Beta-hemolytic streptococci are routinely susceptible to   penicillins,cephalosporins and carbapenems.      Narrative:      Right ankle wound - culture    Fungus culture [[id number]] Collected: 09/06/24 1446    Order Status: Completed Specimen: Incision site from Ankle, Right Updated: 09/09/24 1111     Fungus (Mycology) Culture Culture in progress    Narrative:      Right ankle wound - culture    Blood culture x two cultures. Draw prior to antibiotics. [[id number]]  (Abnormal) Collected: 09/06/24 0235    Order Status: Completed Specimen: Blood from Peripheral, Antecubital, Left Updated: 09/08/24 0713     Blood Culture, Routine Gram stain aer bottle: Gram positive cocci in clusters resembling Staph      Gram stain alicia bottle: Gram positive cocci in clusters resembling Staph      Results called to and read back by:Luciana Altamirano RN 09/06/2024      20:55      STAPHYLOCOCCUS AUREUS  For susceptibility see order #[id number]      Narrative:      Aerobic and anaerobic          Wound Culture:   Recent Labs   Lab 09/06/24  1446   LABAERO STAPHYLOCOCCUS AUREUS  Many  *  STREPTOCOCCUS AGALACTIAE (GROUP B)  Many  Beta-hemolytic streptococci are routinely susceptible to   penicillins,cephalosporins and carbapenems.  *       Significant Imaging: I have reviewed all pertinent imaging results/findings within the past 24 hours.

## 2024-09-14 NOTE — NURSING
Nurses Note -- 4 Eyes      9/14/2024   7:53 AM      Skin assessed during: Daily Assessment      [x] No Altered Skin Integrity Present    []Prevention Measures Documented      [] Yes- Altered Skin Integrity Present or Discovered   [] LDA Added if Not in Epic (Describe Wound)   [] New Altered Skin Integrity was Present on Admit and Documented in LDA   [] Wound Image Taken    Wound Care Consulted? No    Attending Nurse:  FRANCHESKA Olvera    Second RN/Staff Member:  NIEVES Loja

## 2024-09-14 NOTE — ASSESSMENT & PLAN NOTE
Cortes Schroeder is a 64 y.o. male with PMH of DM, HTN  and right trimalleolar ankle fracture status post ex fix on 07/18 with subsequent definitive fixation on 07/26 admitted with right ankle wound dehiscence in the setting of uncontrolled diabetes.      He is s/p I&D of R ankle 09/06. Repeat I&D R medial ankle 09/09. Pending angiography with vascular once Cr improves.     Labs: Worsening DEEPALI - Cr 3.3  Diet: clear liquid diet   Pain control: multimodal regimen  PT/OT:  NWB RLE   DVT PPx: Lovenox 30 renally dosed   Abx:  oxacillin continuous; ID following   Cultures:  ankle cx staph +    Dispo: appreciate plastics and vascular recs, pending angiogram once Cr improves

## 2024-09-14 NOTE — SUBJECTIVE & OBJECTIVE
"Interval History:  Mental status back to baseline   Afebrile. WBC now trending down 22>18  CT C/A/P - no consolidation in lungs or evidence of septic emboli.  Significant for tracheobronchial malacia and hypoaeration of lungs.  Also noted material throughout esophagus - reflux vs delayed motility.    Patient does c/o globus sensation   SLP has evaluated. On clear liquids.   Still with cough - feels like congestion "breaking up"  Some clear/white sputum production  DEEPALI worsening.  Nephrology consulted  Angiogram deferred until improvement in renal function     Review of Systems   Respiratory:  Positive for cough.    Gastrointestinal:  Positive for nausea.   Musculoskeletal:  Positive for arthralgias and myalgias.   Skin:  Positive for color change and wound.   All other systems reviewed and are negative.    Objective:     Vital Signs (Most Recent):  Temp: 98.6 °F (37 °C) (09/13/24 1923)  Pulse: 97 (09/13/24 1923)  Resp: 16 (09/13/24 1923)  BP: (!) 154/70 (09/13/24 1923)  SpO2: 98 % (09/13/24 1923) Vital Signs (24h Range):  Temp:  [97.1 °F (36.2 °C)-98.9 °F (37.2 °C)] 98.6 °F (37 °C)  Pulse:  [] 97  Resp:  [16-20] 16  SpO2:  [95 %-98 %] 98 %  BP: (135-159)/(62-72) 154/70     Weight: 93 kg (205 lb 0.4 oz)  Body mass index is 34.12 kg/m².    Estimated Creatinine Clearance: 26.4 mL/min (A) (based on SCr of 3 mg/dL (H)).     Physical Exam  Vitals and nursing note reviewed.   Constitutional:       General: He is not in acute distress.     Appearance: Normal appearance. He is not ill-appearing, toxic-appearing or diaphoretic.   HENT:      Nose: No congestion.      Mouth/Throat:      Pharynx: No oropharyngeal exudate.   Eyes:      General: No scleral icterus.     Conjunctiva/sclera: Conjunctivae normal.   Cardiovascular:      Rate and Rhythm: Normal rate and regular rhythm.      Heart sounds: No murmur heard.  Pulmonary:      Effort: Pulmonary effort is normal. No respiratory distress.      Breath sounds: No wheezing. "      Comments: Crackles at bases.   Coughs with deep inspiration  O2 nasal cannula - O2 sats 95-98%    Musculoskeletal:      Cervical back: Neck supple. No tenderness.      Comments: Right ankle with surgical dressings in place + wound vac   Skin:     General: Skin is warm and dry.   Neurological:      Mental Status: He is alert. He is disoriented.   Psychiatric:         Mood and Affect: Mood normal.         Behavior: Behavior normal.        Significant Labs: Blood Culture:   Recent Labs   Lab 09/06/24  0235 09/07/24  0910 09/08/24  1641 09/10/24  1711   LABBLOO Gram stain aer bottle: Gram positive cocci in clusters resembling Staph  Gram stain alicia bottle: Gram positive cocci in clusters resembling Staph  Results called to and read back by:Luciana Altamirano RN 09/06/2024  20:52  STAPHYLOCOCCUS AUREUS*  Gram stain aer bottle: Gram positive cocci in clusters resembling Staph  Gram stain alicia bottle: Gram positive cocci in clusters resembling Staph  Results called to and read back by:Luciana Altamirano RN 09/06/2024  20:55  STAPHYLOCOCCUS AUREUS  For susceptibility see order #[id number]  * Gram stain aer bottle: Gram positive cocci in clusters resembling Staph  Results called to and read back by: Roseanne Guevara RN 09/08/2024  14:07  Gram stain alicia bottle: Gram positive cocci in clusters resembling Staph  Positive results previously called 09/08/2024  STAPHYLOCOCCUS AUREUS  ID consult required at [facility].  For susceptibility see order #[id number]  *  Gram stain aer bottle: Gram positive cocci in clusters resembling Staph  Gram stain alicia bottle: Gram positive cocci in clusters resembling Staph  Results called to and read back by:Luciana Altamirano LPN 09/08/2024  03:59  STAPHYLOCOCCUS AUREUS  ID consult required at [facility].  For susceptibility see order #[id number]  * No growth after 5 days.  No growth after 5 days. No Growth to date   No Growth to date  No Growth to date  No Growth to date     CBC:   Recent Labs   Lab 09/12/24  0537 09/12/24  2215 09/13/24  0244   WBC 22.35*  --  18.63*   HGB 8.5*  --  7.9*   HCT 26.0* 22* 23.6*   *  --  484*     CMP:   Recent Labs   Lab 09/12/24  0538 09/13/24  0244   * 131*   K 3.7 4.0    105   CO2 17* 16*   GLU 56* 79   BUN 31* 37*   CREATININE 2.6* 3.0*   CALCIUM 7.9* 7.7*   PROT 6.5 5.9*   ALBUMIN 1.4* 1.2*   BILITOT 0.4 0.4   ALKPHOS 484* 494*   AST 96* 80*   ALT 9* 7*   ANIONGAP 11 10     Microbiology Results (last 7 days)       Procedure Component Value Units Date/Time    Blood culture [[id number]] Collected: 09/10/24 1711    Order Status: Completed Specimen: Blood Updated: 09/13/24 2012     Blood Culture, Routine No Growth to date      No Growth to date      No Growth to date      No Growth to date    Blood culture [[id number]] Collected: 09/08/24 1641    Order Status: Completed Specimen: Blood Updated: 09/13/24 2012     Blood Culture, Routine No growth after 5 days.    Narrative:      [id number]    Blood culture [[id number]] Collected: 09/08/24 1641    Order Status: Completed Specimen: Blood Updated: 09/13/24 2012     Blood Culture, Routine No growth after 5 days.    Narrative:      [id number]    Urine culture [[id number]] Collected: 09/12/24 1543    Order Status: No result Specimen: Urine Updated: 09/12/24 1655    Culture, Anaerobe [[id number]] Collected: 09/06/24 1446    Order Status: Completed Specimen: Incision site from Ankle, Right Updated: 09/12/24 1315     Anaerobic Culture No anaerobes isolated    Narrative:      Right ankle wound - culture    Blood culture x two cultures. Draw prior to antibiotics. [[id number]]  (Abnormal)  (Susceptibility) Collected: 09/06/24 0235    Order Status: Completed Specimen: Blood from Peripheral, Antecubital, Left Updated: 09/11/24 1203     Blood Culture, Routine Gram stain aer bottle: Gram positive cocci in clusters resembling Staph      Gram stain alicia  bottle: Gram positive cocci in clusters resembling Staph      Results called to and read back by:Luciana Altamirano RN 09/06/2024      20:52      STAPHYLOCOCCUS AUREUS    Narrative:      Aerobic and anaerobic    Blood culture [[id number]]  (Abnormal) Collected: 09/07/24 0910    Order Status: Completed Specimen: Blood Updated: 09/10/24 1000     Blood Culture, Routine Gram stain aer bottle: Gram positive cocci in clusters resembling Staph      Results called to and read back by: Roseanne Guevara RN 09/08/2024  14:07      Gram stain alicia bottle: Gram positive cocci in clusters resembling Staph      Positive results previously called 09/08/2024      STAPHYLOCOCCUS AUREUS  ID consult required at [facility] and [facility] locations.  For susceptibility see order #[id number]      Blood culture [[id number]]  (Abnormal) Collected: 09/07/24 0910    Order Status: Completed Specimen: Blood Updated: 09/10/24 0959     Blood Culture, Routine Gram stain aer bottle: Gram positive cocci in clusters resembling Staph      Gram stain alicia bottle: Gram positive cocci in clusters resembling Staph      Results called to and read back by:Luciana Altamirano LPN 09/08/2024      03:59      STAPHYLOCOCCUS AUREUS  ID consult required at [facility] and [facility] locations.  For susceptibility see order #[id number]      AFB Culture & Smear [[id number]] Collected: 09/06/24 1446    Order Status: Completed Specimen: Incision site from Ankle, Right Updated: 09/09/24 1545     AFB Culture & Smear Culture in progress     AFB CULTURE STAIN No acid fast bacilli seen.    Narrative:      Right ankle wound - culture    Aerobic culture [[id number]]  (Abnormal)  (Susceptibility) Collected: 09/06/24 1446    Order Status: Completed Specimen: Incision site from Ankle, Right Updated: 09/09/24 1431     Aerobic Bacterial Culture STAPHYLOCOCCUS AUREUS  Many        STREPTOCOCCUS AGALACTIAE (GROUP B)  Many  Beta-hemolytic streptococci are routinely susceptible to    penicillins,cephalosporins and carbapenems.      Narrative:      Right ankle wound - culture    Fungus culture [[id number]] Collected: 09/06/24 1446    Order Status: Completed Specimen: Incision site from Ankle, Right Updated: 09/09/24 1111     Fungus (Mycology) Culture Culture in progress    Narrative:      Right ankle wound - culture    Blood culture x two cultures. Draw prior to antibiotics. [[id number]]  (Abnormal) Collected: 09/06/24 0235    Order Status: Completed Specimen: Blood from Peripheral, Antecubital, Left Updated: 09/08/24 0713     Blood Culture, Routine Gram stain aer bottle: Gram positive cocci in clusters resembling Staph      Gram stain alicia bottle: Gram positive cocci in clusters resembling Staph      Results called to and read back by:Luciana Altamirano RN 09/06/2024      20:55      STAPHYLOCOCCUS AUREUS  For susceptibility see order #[id number]      Narrative:      Aerobic and anaerobic          Wound Culture:   Recent Labs   Lab 09/06/24  1446   LABAERO STAPHYLOCOCCUS AUREUS  Many  *  STREPTOCOCCUS AGALACTIAE (GROUP B)  Many  Beta-hemolytic streptococci are routinely susceptible to   penicillins,cephalosporins and carbapenems.  *       Significant Imaging: I have reviewed all pertinent imaging results/findings within the past 24 hours.

## 2024-09-14 NOTE — ASSESSMENT & PLAN NOTE
Coughing on exam witnesseed 9/13 with water and some secretions in airway on CT, speech eval placed and rec continue liquids at this time  Protnoix started as some reflux may be contributing, and doxy stopped 9/14 in case contributing also

## 2024-09-14 NOTE — SUBJECTIVE & OBJECTIVE
"Interval HPI:   No acute events overnight. Patient in room 542/542 A. Blood glucose stable. BG at goal on current insulin regimen (SSI, basal insulin ). Steroid use- None.     Renal function-   Lab Results   Component Value Date    CREATININE 3.3 (H) 2024       Vasopressors-  None      Diet Clear liquid (no sugar)/Bariatric      Eatin%  Nausea: No  Hypoglycemia and intervention: No   Fever: No  TPN and/or TF: No    BP (!) 168/77 (BP Location: Right arm, Patient Position: Lying)   Pulse 91   Temp 98.5 °F (36.9 °C) (Oral)   Resp 18   Ht 5' 5" (1.651 m)   Wt 93 kg (205 lb 0.4 oz)   SpO2 98%   BMI 34.12 kg/m²     Labs Reviewed and Include    Recent Labs   Lab 24  0337   *   CALCIUM 8.0*   ALBUMIN 1.2*   PROT 6.0   *   K 4.1   CO2 17*      BUN 46*   CREATININE 3.3*   ALKPHOS 413*   ALT 7*   AST 49*   BILITOT 0.4     Lab Results   Component Value Date    WBC 18.70 (H) 2024    HGB 8.3 (L) 2024    HCT 24.4 (L) 2024    MCV 84 2024     (H) 2024     No results for input(s): "TSH", "FREET4" in the last 168 hours.  Lab Results   Component Value Date    HGBA1C 8.5 (H) 2024       Nutritional status:   Body mass index is 34.12 kg/m².  Lab Results   Component Value Date    ALBUMIN 1.2 (L) 2024    ALBUMIN 1.2 (L) 2024    ALBUMIN 1.4 (L) 2024     Lab Results   Component Value Date    PREALBUMIN 3 (L) 2024    PREALBUMIN 13 (L) 2024       Estimated Creatinine Clearance: 24 mL/min (A) (based on SCr of 3.3 mg/dL (H)).    Accu-Checks  Recent Labs     24  1524 24  2142 24  2201 24  2244 24  0452 24  0722 24  1059 24  1532 24  2100 24  0720   POCTGLUCOSE 272* 48* 145* 109 88 107 137* 228* 198* 153*       Current Medications and/or Treatments Impacting Glycemic Control  Immunotherapy:    Immunosuppressants       None          Steroids:   Hormones (From admission, " onward)      None          Pressors:    Autonomic Drugs (From admission, onward)      None          Hyperglycemia/Diabetes Medications:   Antihyperglycemics (From admission, onward)      Start     Stop Route Frequency Ordered    09/13/24 2100  insulin glargine U-100 (Lantus) pen 18 Units         -- SubQ Nightly 09/13/24 0730    09/13/24 0830  insulin aspart U-100 pen 0-10 Units         -- SubQ Every 4 hours PRN 09/13/24 0731

## 2024-09-14 NOTE — PROGRESS NOTES
"[facility]  Endocrinology  Progress Note    Admit Date: 2024     Reason for Consult: Management of T2DM, Hyperglycemia      Surgical Procedure and Date: S/P irrigation debridement of RLE on 2024    Diabetes diagnosis year:      Home Diabetes Medications:    Humalog via OmniPod insulin pump  Mounjaro 10 mg weekly     Current pump settings:  Basal 12 am to 2.3 and 6 am to 1.55  ICR to 3 at 12 am, 2 at 4 pm  ISF to 1:20   AIT 3 hrs  Target 110-120        Patient had anaphylaxis reaction to SGLT2 inhibitors specifically Invokana     How often checking glucose at home? Dexcom G6   BG readings on regimen: Average 210's per Dexcom  Hypoglycemia on the regimen?  Yes  Missed doses on regimen?  No     Diabetes Complications include:     Hyperglycemia and Diabetic peripheral neuropathy         Complicating diabetes co morbidities:   HLD, HTN, Obesity         HPI: 64 y.o. male presents to the ED w/ complaint of altered mental status. Hx of R ankle fracture with surgery over a month ago. Patient now presents with sepsis 2/2 R ankle infection s/p ORIF on . Started on IV vanc and cefepime. Given IVFs. Blood cultures pending. Brought to OR with ortho on  for irrigation debridement of RLE. Endocrine following for BG and type 2 diabetes.     Lab Results   Component Value Date    LABA1C 10.8 (H) 08/15/2016    HGBA1C 8.5 (H) 2024         Interval HPI:   No acute events overnight. Patient in room 542/542 A. Blood glucose stable. BG at goal on current insulin regimen (SSI, basal insulin ). Steroid use- None.     Renal function-   Lab Results   Component Value Date    CREATININE 3.3 (H) 2024       Vasopressors-  None      Diet Clear liquid (no sugar)/Bariatric      Eatin%  Nausea: No  Hypoglycemia and intervention: No   Fever: No  TPN and/or TF: No    BP (!) 168/77 (BP Location: Right arm, Patient Position: Lying)   Pulse 91   Temp 98.5 °F (36.9 °C) (Oral)   Resp 18   Ht 5' 5" (1.651 " "m)   Wt 93 kg (205 lb 0.4 oz)   SpO2 98%   BMI 34.12 kg/m²     Labs Reviewed and Include    Recent Labs   Lab 09/14/24  0337   *   CALCIUM 8.0*   ALBUMIN 1.2*   PROT 6.0   *   K 4.1   CO2 17*      BUN 46*   CREATININE 3.3*   ALKPHOS 413*   ALT 7*   AST 49*   BILITOT 0.4     Lab Results   Component Value Date    WBC 18.70 (H) 09/14/2024    HGB 8.3 (L) 09/14/2024    HCT 24.4 (L) 09/14/2024    MCV 84 09/14/2024     (H) 09/14/2024     No results for input(s): "TSH", "FREET4" in the last 168 hours.  Lab Results   Component Value Date    HGBA1C 8.5 (H) 09/06/2024       Nutritional status:   Body mass index is 34.12 kg/m².  Lab Results   Component Value Date    ALBUMIN 1.2 (L) 09/14/2024    ALBUMIN 1.2 (L) 09/13/2024    ALBUMIN 1.4 (L) 09/12/2024     Lab Results   Component Value Date    PREALBUMIN 3 (L) 09/06/2024    PREALBUMIN 13 (L) 07/18/2024       Estimated Creatinine Clearance: 24 mL/min (A) (based on SCr of 3.3 mg/dL (H)).    Accu-Checks  Recent Labs     09/12/24  1524 09/12/24  2142 09/12/24  2201 09/12/24  2244 09/13/24  0452 09/13/24  0722 09/13/24  1059 09/13/24  1532 09/13/24  2100 09/14/24  0720   POCTGLUCOSE 272* 48* 145* 109 88 107 137* 228* 198* 153*       Current Medications and/or Treatments Impacting Glycemic Control  Immunotherapy:    Immunosuppressants       None          Steroids:   Hormones (From admission, onward)      None          Pressors:    Autonomic Drugs (From admission, onward)      None          Hyperglycemia/Diabetes Medications:   Antihyperglycemics (From admission, onward)      Start     Stop Route Frequency Ordered    09/13/24 2100  insulin glargine U-100 (Lantus) pen 18 Units         -- SubQ Nightly 09/13/24 0730    09/13/24 0830  insulin aspart U-100 pen 0-10 Units         -- SubQ Every 4 hours PRN 09/13/24 0731            ASSESSMENT and PLAN    Renal/  Acute kidney injury (DEEPALI) with acute tubular necrosis (ATN)  Titrate insulin slowly to avoid " hypoglycemia as the risk of hypoglycemia increases with decreased creatinine clearance.  Estimated Creatinine Clearance: 24 mL/min (A) (based on SCr of 3.3 mg/dL (H)).        Endocrine  Uncontrolled diabetes mellitus with hyperglycemia  Endocrinology consulted for BG management.   BG goal 140-180    - Lantus (Insulin Glargine 18 units nightly   - [facility] SSI (150/25)  - BG checks q4hr  - Hypoglycemia protocol in place    ** Please notify Endocrine for any change and/or advance in diet**  ** Please call Endocrine for any BG related issues **    Discharge Planning:   TBD. Please notify endocrinology prior to discharge.        Orthopedic  * Surgical wound breakdown  Optimize BG control to improve wound healing  Managed per primary team              Payton Vora PA-C  Endocrinology  [facility]

## 2024-09-14 NOTE — ASSESSMENT & PLAN NOTE
Persistent non-productive cough, though some sputum production today.  CTA negative for PE.  CT chest yesterday showed no consolidation in lungs or evidence of septic emboli.  It was significant for tracheobronchial malacia and hypoaeration of lungs.  Also noted material throughout esophagus - reflux vs delayed motility.  Patient does c/o globus sensation. SLP consulted.   O2 sats 95-98% on 3 liters nasal cannula.  Currently on oral doxycycline.  Procalcitonin trending down     Plan:  No convincing evidence of PNA on imaging and given evidence of possible delayed esophageal motility, recommend discontinuing doxycycline which can cause esophageal irritation and monitoring closely.   Aspiration precautions

## 2024-09-14 NOTE — ASSESSMENT & PLAN NOTE
Seen on UA after delvalle for retention, per ID hold on teratment now as unclear if symptoms, reports 1 day of urine burning this week  -will f/u cx and has no growth

## 2024-09-14 NOTE — SUBJECTIVE & OBJECTIVE
Principal Problem:Surgical wound breakdown    Principal Orthopedic Problem: s/p I&D and wound vac placement on 09/06    Interval History: AF, HDS overnight. NAEON. Pain moderately well controlled, medications helping. Tolerating PO. No other concerns this am. Pending angio once Cr improved.       Review of patient's allergies indicates:   Allergen Reactions    Invokana [canagliflozin] Anaphylaxis    Percocet [oxycodone-acetaminophen] Nausea Only and Hallucinations    Biaxin [clarithromycin]     Hydrocodone Other (See Comments)     Dizzy/nausea/hallucinations    Sulfa (sulfonamide antibiotics) Nausea Only and Rash       Current Facility-Administered Medications   Medication    acetaminophen tablet 500 mg    albuterol-ipratropium 2.5 mg-0.5 mg/3 mL nebulizer solution 3 mL    calcium carbonate 200 mg calcium (500 mg) chewable tablet 1,000 mg    dextrose 10% bolus 125 mL 125 mL    dextrose 10% bolus 125 mL 125 mL    dextrose 10% bolus 250 mL 250 mL    dextrose 10% bolus 250 mL 250 mL    enoxaparin injection 30 mg    glucagon (human recombinant) injection 1 mg    glucose chewable tablet 16 g    glucose chewable tablet 24 g    guaiFENesin 100 mg/5 ml syrup 400 mg    hydrALAZINE tablet 25 mg    insulin aspart U-100 pen 0-10 Units    insulin glargine U-100 (Lantus) pen 18 Units    morphine injection 2 mg    morphine injection 2 mg    morphine injection 4 mg    nortriptyline capsule 50 mg    ondansetron disintegrating tablet 8 mg    oxacillin 12 g in  mL CONTINUOUS INFUSION    pantoprazole EC tablet 40 mg    polyethylene glycol packet 17 g    senna tablet 8.6 mg    sevelamer carbonate tablet 800 mg    sodium bicarbonate tablet 650 mg     Objective:     Vital Signs (Most Recent):  Temp: 98.3 °F (36.8 °C) (09/14/24 0504)  Pulse: 93 (09/14/24 0504)  Resp: 16 (09/14/24 0504)  BP: (!) 170/78 (09/14/24 0504)  SpO2: (!) 94 % (09/14/24 0504) Vital Signs (24h Range):  Temp:  [97.1 °F (36.2 °C)-98.6 °F (37 °C)] 98.3 °F (36.8  "°C)  Pulse:  [] 93  Resp:  [16-20] 16  SpO2:  [94 %-98 %] 94 %  BP: (131-170)/(62-82) 170/78     Weight: 93 kg (205 lb 0.4 oz)  Height: 5' 5" (165.1 cm)  Body mass index is 34.12 kg/m².      Intake/Output Summary (Last 24 hours) at 9/14/2024 0733  Last data filed at 9/14/2024 0504  Gross per 24 hour   Intake --   Output 850 ml   Net -850 ml        Ortho/SPM Exam     AAOx4  NAD  Tachycardic  No increased WOB    RLE  Splint C/D/I   Wound vac to good suction  Compartments soft/compressible  Reduced sensation to the dorsum of toe  Active toe dorsiflexion & plantarflexion  WWP. Brisk cap refill      Significant Labs:   Recent Labs   Lab 09/14/24  0337   WBC 18.70*   RBC 2.91*   HGB 8.3*   HCT 24.4*   *   MCV 84   MCH 28.5   MCHC 34.0         Significant Imaging: I have reviewed and interpreted all pertinent imaging results/findings.  CTA RLE: Multifocal high-grade stenoses throughout the right calf arteries. No arterial occlusion.    CTA chest: No large central PE identified  "

## 2024-09-14 NOTE — ASSESSMENT & PLAN NOTE
Titrate insulin slowly to avoid hypoglycemia as the risk of hypoglycemia increases with decreased creatinine clearance.  Estimated Creatinine Clearance: 24 mL/min (A) (based on SCr of 3.3 mg/dL (H)).

## 2024-09-14 NOTE — ASSESSMENT & PLAN NOTE
Had issue last admit with prolonged constipation requiring enemas  -on bowel regimen  -has some bowel gas seen throughout CT abdomen, will giev suppository 9/13 to stay ahead and BM reported

## 2024-09-14 NOTE — ASSESSMENT & PLAN NOTE
Endocrinology consulted for BG management.   BG goal 140-180    - Lantus (Insulin Glargine 18 units nightly   - [facility] [place] (150/25)  - BG checks q4hr  - Hypoglycemia protocol in place    ** Please notify Endocrine for any change and/or advance in diet**  ** Please call Endocrine for any BG related issues **    Discharge Planning:   TBD. Please notify endocrinology prior to discharge.

## 2024-09-14 NOTE — NURSING
Nurses Note -- 4 Eyes      9/14/2024   6:28 AM      Skin assessed during: Daily Assessment      [x] No Altered Skin Integrity Present    []Prevention Measures Documented      [] Yes- Altered Skin Integrity Present or Discovered   [] LDA Added if Not in Epic (Describe Wound)   [] New Altered Skin Integrity was Present on Admit and Documented in LDA   [] Wound Image Taken    Wound Care Consulted? No    Attending Nurse:  Luciana Hoffmann RN/Staff Member:   TATUM Long

## 2024-09-14 NOTE — ASSESSMENT & PLAN NOTE
- liver enzymes up and down, highest ast at 155, between   Now at 96-80---49  - will hold truvada for now  - monitor with daily CMP

## 2024-09-14 NOTE — ASSESSMENT & PLAN NOTE
This patient does have evidence of infective focus  My overall impression is sepsis.  Source: Skin and Soft Tissue (location ankle)  Antibiotics given-   Antibiotics (72h ago, onward)      Start     Stop Route Frequency Ordered    09/09/24 1730  oxacillin 12 g in  mL CONTINUOUS INFUSION         -- IV Every 24 hours (non-standard times) 09/09/24 1630          Latest lactate reviewed-  Recent Labs   Lab 09/12/24  1618 09/12/24  2216   LACTATE 0.6  --    POCLAC  --  <0.30*       Organ dysfunction indicated by Acute kidney injury and Encephalopathy    Fluid challenge Actual Body weight- Patient will receive 30ml/kg actual body weight to calculate fluid bolus for treatment of septic shock.     Post- resuscitation assessment No - Post resuscitation assessment not needed     Will Not start Pressors-   Source control achieved by: see above  -secondary to surgical wound infeection in right ankle, with 9/6 wound Cx with group B strep and staph with bacteremia with 9/7 and 9/6  blood with staph with 9/8 and 9/10 blood Cx NGTD  ID following. Echo without signs of vegtations. On oxacillin now. Wbc still higher at 22, and ID following, procal 2 on 9/12-->1 on 9/13. CRp improved to 235. Wbc starting to improve at 18 now. .doxy stopped given no PNA signs on CT. Reached out to ortho on 9/14 regarding ankle as no obvious other source of elevated wbc per discussion with ID to see if any plans to reassess wound under vac, last op note had necrosis and no obvious purulence.

## 2024-09-14 NOTE — ASSESSMENT & PLAN NOTE
64 year old with poorly controlled DM (A1C 8.5), recent fall resulting in left wrist fx and right ankle fracture 07/18/24 s/p ex fix Rt ankle and ORIF left wrist 07/19, and later subsequent ORIF right ankle on 7/26/24 with Dr. Liz -- now c/b deep surgical site infection of right ankle involving underlying hardware with associated bacteremia.   No SOI in left wrist.      S/p I&D on 09/06 and 9/9.  Op report noting purulence overlying hardware.  No plans for hardware removal at this time.  Unable to achieve primary closure and Plastic Surgery consulted.   A CTA of  lower extremity showed multifocal high grade stenosis throughout right calf, though no occlusion and Vascular Surgery consulted. Right TBI .41. Attempted angiogram yesterday, but developed extreme confusion/AMS with versed, rapid respiratory rate with versed/fentanyl and procedure postponed.   Further plans for angiogram deferred for now given DEEPALI (Nephrology consulted)  Plastics recommendations are pending Vascular workup.      Surgical cxs +GBS, MSSA.  Blood cultures 9/6 and 9/7 + for MSSA.  Repeat blood cultures 9/8, 9/11 NGTD.  2D echo negative.   Remains on IV oxacillin for MSSA.  Afebrile.  WBC now trending down.     Recommendations / Plan:  Continue IV oxacillin 12 g q 24 hours continuous infusion.  Continue to monitor liver enzymes.   Anticipate 6 weeks of IV antibiotics from date of most recent washout.    Will consider adding adjunctive rifampin for biofilm penetration if hardware is to remain - will wait to add until other issues resolved. Will discuss significance of potential interaction with truvada with ID PharmD  Anticipate long term oral antibiotic suppression following IV abx so long as hardware remains.   Will follow    Data reviewed and plan discussed with ID staff  Secure chat/Discussed above plan with Primary Team

## 2024-09-14 NOTE — ASSESSMENT & PLAN NOTE
Patient with Hypoxic Respiratory failure which is Acute.  he is not on home oxygen. Supplemental oxygen was provided and noted-      .   Signs/symptoms of respiratory failure include- tachypnea and lethargy. Contributing diagnoses includes - Obesity Hypoventilation and Pneumonia Labs and images were reviewed. Patient Has not had a recent ABG. Will treat underlying causes and adjust management of respiratory failure as follows-     - ABG on 9/12 without major abnormality, P02 [id number], [id number], continue oxygen now, CT chest showing tracheomalachia/hyperinflation lungs. Has no hx of excessive intubation or lung dx known to him. Using IS on exam 9/13 and speaking in full sentences on exam, with small amount of clear/white sputum. No obvious signs of volume overload, some cough on exam and keep on liquid diet per speech recs. Stop doxy on 9/14 as no signs of PNA on exam and could be contributor to some of dysphagia per ID. Wean oxygen as tolerates, sputum resolved per patient. Breathing comfortable on exam      - CTA ordered by ortho/anesthesia prior to I&D. Negative for PE, but small ground glass area to RUL representing infectious vs. Inflammatory process.  - doxy added for pneumonia coverage.  - mucinex BID, acapella, and nebulized saline . Has IS at bedside on exam 9/12  - prn duo nebs   9/8: no signs of overload. Left Ventricle: The left ventricle is normal in size. Normal wall thickness. There is normal systolic function with a visually estimated ejection fraction of 65 - 70%. There is normal diastolic function.    Right Ventricle: Normal right ventricular cavity size. Wall thickness is normal. Systolic function is normal.    IVC/SVC: Normal venous pressure at 3 mmHg.    No evidence of intracardiac mass or vegetation.

## 2024-09-14 NOTE — PROGRESS NOTES
[facility]  Orthopedics  Progress Note    Patient Name: Cortes Schroeder  MRN: [id number]  Admission Date: 9/6/2024  Hospital Length of Stay: 8 days  Attending Provider: Maria Del Rosario Medina MD  Primary Care Provider: Andrew Sinclair Jr., MD  Follow-up For: Procedure(s) (LRB):  Angiogram, Lower Arterial, Unilateral (Right)    Post-Operative Day: 2 Days Post-Op  Subjective:     Principal Problem:Surgical wound breakdown    Principal Orthopedic Problem: s/p I&D and wound vac placement on 09/06    Interval History: AF, HDS overnight. NAEON. Pain moderately well controlled, medications helping. Tolerating PO. No other concerns this am. Pending angio once Cr improved.       Review of patient's allergies indicates:   Allergen Reactions    Invokana [canagliflozin] Anaphylaxis    Percocet [oxycodone-acetaminophen] Nausea Only and Hallucinations    Biaxin [clarithromycin]     Hydrocodone Other (See Comments)     Dizzy/nausea/hallucinations    Sulfa (sulfonamide antibiotics) Nausea Only and Rash       Current Facility-Administered Medications   Medication    acetaminophen tablet 500 mg    albuterol-ipratropium 2.5 mg-0.5 mg/3 mL nebulizer solution 3 mL    calcium carbonate 200 mg calcium (500 mg) chewable tablet 1,000 mg    dextrose 10% bolus 125 mL 125 mL    dextrose 10% bolus 125 mL 125 mL    dextrose 10% bolus 250 mL 250 mL    dextrose 10% bolus 250 mL 250 mL    enoxaparin injection 30 mg    glucagon (human recombinant) injection 1 mg    glucose chewable tablet 16 g    glucose chewable tablet 24 g    guaiFENesin 100 mg/5 ml syrup 400 mg    hydrALAZINE tablet 25 mg    insulin aspart U-100 pen 0-10 Units    insulin glargine U-100 (Lantus) pen 18 Units    morphine injection 2 mg    morphine injection 2 mg    morphine injection 4 mg    nortriptyline capsule 50 mg    ondansetron disintegrating tablet 8 mg    oxacillin 12 g in  mL CONTINUOUS INFUSION    pantoprazole EC tablet 40 mg    polyethylene glycol packet  "17 g    senna tablet 8.6 mg    sevelamer carbonate tablet 800 mg    sodium bicarbonate tablet 650 mg     Objective:     Vital Signs (Most Recent):  Temp: 98.3 °F (36.8 °C) (09/14/24 0504)  Pulse: 93 (09/14/24 0504)  Resp: 16 (09/14/24 0504)  BP: (!) 170/78 (09/14/24 0504)  SpO2: (!) 94 % (09/14/24 0504) Vital Signs (24h Range):  Temp:  [97.1 °F (36.2 °C)-98.6 °F (37 °C)] 98.3 °F (36.8 °C)  Pulse:  [] 93  Resp:  [16-20] 16  SpO2:  [94 %-98 %] 94 %  BP: (131-170)/(62-82) 170/78     Weight: 93 kg (205 lb 0.4 oz)  Height: 5' 5" (165.1 cm)  Body mass index is 34.12 kg/m².      Intake/Output Summary (Last 24 hours) at 9/14/2024 0733  Last data filed at 9/14/2024 0504  Gross per 24 hour   Intake --   Output 850 ml   Net -850 ml        Ortho/SPM Exam     AAOx4  NAD  Tachycardic  No increased WOB    RLE  Splint C/D/I   Wound vac to good suction  Compartments soft/compressible  Reduced sensation to the dorsum of toe  Active toe dorsiflexion & plantarflexion  WWP. Brisk cap refill      Significant Labs:   Recent Labs   Lab 09/14/24  0337   WBC 18.70*   RBC 2.91*   HGB 8.3*   HCT 24.4*   *   MCV 84   MCH 28.5   MCHC 34.0         Significant Imaging: I have reviewed and interpreted all pertinent imaging results/findings.  CTA RLE: Multifocal high-grade stenoses throughout the right calf arteries. No arterial occlusion.    CTA chest: No large central PE identified  Assessment/Plan:     * Surgical wound breakdown  Cortes Schroeder is a 64 y.o. male with PMH of DM, HTN  and right trimalleolar ankle fracture status post ex fix on 07/18 with subsequent definitive fixation on 07/26 admitted with right ankle wound dehiscence in the setting of uncontrolled diabetes.      He is s/p I&D of R ankle 09/06. Repeat I&D R medial ankle 09/09. Pending angiography with vascular once Cr improves.     Labs: Worsening DEEPALI - Cr 3.3  Diet: clear liquid diet   Pain control: multimodal regimen  PT/OT:  RENE BONILLAE   DVT PPx: Lovenox 30 renally " dosed   Abx:  oxacillin continuous; ID following   Cultures:  ankle cx staph +    Dispo: appreciate plastics and vascular recs, pending angiogram once Cr improves                  MARYSE Barahona MD  Orthopedics  [facility] - Surgery

## 2024-09-14 NOTE — ASSESSMENT & PLAN NOTE
Covering doxy for URI, on mucinex, mostly dry but now with some scant white/clear sputum on 9./13 and sputum resolved now and doxy stopped 9/;14 per ID

## 2024-09-14 NOTE — PROGRESS NOTES
.Plastic and Reconstructive Surgery   Progress Note    Subjective:    Pending Vasc angio  Creatinine increased again 3.3 this a.m.   Otherwise no issues    Objective:  Vital signs in last 24 hours:  Temp:  [97.1 °F (36.2 °C)-98.6 °F (37 °C)] 98.5 °F (36.9 °C)  Pulse:  [] 91  Resp:  [16-20] 18  SpO2:  [94 %-98 %] 98 %  BP: (131-170)/(62-82) 168/77    Intake/Output last 3 shifts:  I/O last 3 completed shifts:  In: -   Out: 1430 [Urine:1430]    Intake/Output this shift:  No intake/output data recorded.        Physical Exam:  VITAL SIGNS:   Vitals:    24 2325 24 0245 24 0504 24 0753   BP: 131/82  (!) 170/78 (!) 168/77   BP Location: Right arm  Right arm Right arm   Patient Position: Lying  Lying Lying   Pulse: 90 93 93 91   Resp: 16  16 18   Temp: 97.9 °F (36.6 °C)  98.3 °F (36.8 °C) 98.5 °F (36.9 °C)   TempSrc: Oral  Oral Oral   SpO2: 97%  (!) 94% 98%   Weight:       Height:         TMAX: Temp (24hrs), Av.2 °F (36.8 °C), Min:97.1 °F (36.2 °C), Max:98.6 °F (37 °C)      General: Alert; No acute distress  Cardiovascular: Regular rate   Respiratory: Normal respiratory effort. Chest rise symmetric.   Abdomen: Soft, nontender, nondistended  Extremity:  RLE in posterior slab splint. Softly palpable Pop. Unable to assess Rt dp/pt. Cap refill on distal toes 2-3 secs, somewhat cool to touch.   Neurologic: No focal deficit. Speech normal      Scheduled Medications enoxaparin, 30 mg, Daily  guaiFENesin 100 mg/5 ml, 400 mg, Q6H  insulin glargine U-100, 18 Units, QHS  morphine, 2 mg, Once  nortriptyline, 50 mg, Nightly  oxacillin 12 g in  mL CONTINUOUS INFUSION, 12 g, Q24H  pantoprazole, 40 mg, Daily  polyethylene glycol, 17 g, BID  senna, 8.6 mg, BID  sevelamer carbonate, 800 mg, TID WM  sodium bicarbonate, 650 mg, TID        PRN Medications     Current Facility-Administered Medications:     acetaminophen, 500 mg, Oral, Q8H PRN    albuterol-ipratropium, 3 mL, Nebulization, Q4H PRN    calcium  carbonate, 1,000 mg, Oral, TID PRN    dextrose 10%, 12.5 g, Intravenous, PRN    dextrose 10%, 12.5 g, Intravenous, PRN    dextrose 10%, 25 g, Intravenous, PRN    dextrose 10%, 25 g, Intravenous, PRN    glucagon (human recombinant), 1 mg, Intramuscular, PRN    glucose, 16 g, Oral, PRN    glucose, 24 g, Oral, PRN    hydrALAZINE, 25 mg, Oral, Q8H PRN    insulin aspart U-100, 0-10 Units, Subcutaneous, Q4H PRN    morphine, 2 mg, Intravenous, Q6H PRN    morphine, 4 mg, Intravenous, Q6H PRN    ondansetron, 8 mg, Oral, Q8H PRN    Recent Labs:   Lab Results   Component Value Date    WBC 18.70 (H) 09/14/2024    HGB 8.3 (L) 09/14/2024    HCT 24.4 (L) 09/14/2024    MCV 84 09/14/2024     (H) 09/14/2024     Lab Results   Component Value Date     (H) 09/14/2024     (L) 09/14/2024    K 4.1 09/14/2024     09/14/2024    BUN 46 (H) 09/14/2024         Assessment:   64 y.o. y/o male s/p Procedure(s):  IRRIGATION AND DEBRIDEMENT, LOWER EXTREMITY - WITH WOUND VAC CHANGE, RIGHT ANKLE  ARTHROTOMY, ANKLE      2 Days Post-Op     64 y.o.male W/ right ankle post-surgical medial wound dehiscence and exposed hardware.     Plan  -increasing creatinine, angiogram has been placed on hold  - continue optimization of BG, endocrinology on board  - ID on board, continue  IV abx. Current wbc remains elevated  - orthopedics on board to assess for source control, possible hardware removal.  - malnutition: will need optimization prior to OR. Prealbumin and transferrin low on last check.   - Vascular cx for preoperative optimization of arterial flow    Pending Vascular angiogram when medically stable    Destin Enamorado MD  Plastic Surgery Fellow  09/14/2024

## 2024-09-14 NOTE — PROGRESS NOTES
[facility] Medicine  Progress Note    Patient Name: Cortes Schroeder  MRN: [id number]  Patient Class: IP- Inpatient   Admission Date: 9/6/2024  Length of Stay: 8 days  Attending Physician: Maria Del Rosario Medina MD  Primary Care Provider: Andrew Sinclair Jr., MD        Subjective:     Principal Problem:Surgical wound breakdown        HPI:  Cortes Schroeder is a 64 y.o. M with PMHx of anxiety, T2DM, GERD, HLD, HTN who presented to ED for AMS. He had a fall in July that resulted in R trimalleolar fracture and L distal radius fracture. He had external fixation on 07/18 and then ORIF on 07/26 with Dr. Liz. He was prescribed clinda 300 mg on 08/28 for a ten day course. Patient was doing well when he acutely became altered and not acting like himself a few hours ago. Patient family member drove him here from [facility] for ortho consult. R medial ankle wound dehisced with redness and swelling around it. No CPM fever, cough, N/V/D or seizure.    In ED: T max 99.1. SIRS 2/4 with WBC 18 and . CMP notable for Na 127, Cr 1.7, . Phos 1.9. .3. BNP 73. Trop neg. A1c 8.5. R tib fib XR notes There are 2 abandoned anterior-posteriorly oriented pin tracks in the right mid tibial shaft.  On AP views, each of these pin tracks demonstrates a small faint internal density, possibly representing a sequestrum. Ortho and endocrine consulted. Given IV vanc and IV clindamycin. Given 2.7L IVFs. Admitted to hospital medicine for sepsis 2/2 infected hardware.     Overview/Hospital Course:  Cortes Schroeder is a 63 yo M admitted to hospital medicine for sepsis 2/2 R ankle infection s/p ORIF on 07/26. Started on IV vanc and cefepime. Given IVFs. Brought to OR with ortho on 09/06 for irrigation debridement of RLE. Endocrine following for BG. Was on vanc and zosyn. Echo without concern for vegetations. 2/2 Blood cultures and 2/2 repeats with Staph aureus. Wound cultures with Staph aureus and Group B  strep. Will follow cultures. ID consulted as suspect patient will need long course of abx; now changing to continuous oxacillin. CTA chest negative for PE, but notes small area of ground glass changes to RUL. Patient now with cough. Adding doxy for possible PNA. Ortho repeated I&D on 09/09. WBC remains elevated, but fever has resolved. Plastics consulted for flap eval. CTA RLE with moderate atherosclerosis and multifocal high grade stenosis throughout R calf arteries. Vascular surgery consulted per plastics recs as they would like to pre-optimiize blood flow prior to any free flap or pedicled propellar flap. RD consulted for malnutrition. Now with DEEPALI, but likely 2/2 infection and multiple images requiring contrast. Will need PT/OT consult prior to discharge.     Interval History: he reports doing okay overnight. Small amount of clear sputum has resolved. No increased RR or signs of dyspnea on exam when talking today, on low flow oxygen. Discussed with Jurgen from ID, stopping doxy given no signs of PNA on CT and may be contributor to esphageal irritation given had globus sensation in esophagus/chest area with speech yesterday reported. He is doing okay with liquids, he reports he hopes its something today besides just beef broth for meals. Cr 3.3 and a tick higher as renal reports ATN signs on spinning urine and likely from combination of sepsis vs from contrast and discussed with him at length and that likely will hit plateau before improvement and hopefully nearing plateau soon. Phos elevated from this and binders started today. Liver enzymes slowly improving with ast 49. BM reported yesterday. 800 cc urine output in delvalle. Wbc still 18, broad work up unrevealing for other sources, messaged dr walsh with ortho to see thoughts on if needs another look at ankle given had necrosis of tissue on last op note without obvious purulence but concern may need to assess again from ortho standpoint to ensure nidus for elevated  wbc not coming from tissue source here and he reports will discuss with dr velez this weekend regarding this. Angiogram on hold for now until improved Cr and discussed with him at length for reasons for this in regards to long term planning with plastics/ortho. His roommate hugo came to visit yesterday and says he is coming this weekend again he thinks to visit.        Review of Systems   Constitutional:  Positive for activity change and fatigue. Negative for chills and fever.   Respiratory:  Positive for cough. Negative for chest tightness and shortness of breath.    Cardiovascular:  Negative for chest pain and leg swelling.   Gastrointestinal:  Positive for nausea. Negative for abdominal pain.   Musculoskeletal:  Positive for arthralgias.   Skin:  Positive for color change and wound.   Neurological:  Negative for dizziness and weakness.     Objective:     Vital Signs (Most Recent):  Temp: 98.1 °F (36.7 °C) (09/14/24 1034)  Pulse: 92 (09/14/24 1052)  Resp: 18 (09/14/24 1034)  BP: (!) 160/72 (09/14/24 1034)  SpO2: 99 % (09/14/24 1034) Vital Signs (24h Range):  Temp:  [97.1 °F (36.2 °C)-98.6 °F (37 °C)] 98.1 °F (36.7 °C)  Pulse:  [] 92  Resp:  [16-20] 18  SpO2:  [94 %-99 %] 99 %  BP: (131-170)/(66-82) 160/72     Weight: 93 kg (205 lb 0.4 oz)  Body mass index is 34.12 kg/m².    Intake/Output Summary (Last 24 hours) at 9/14/2024 1123  Last data filed at 9/14/2024 1020  Gross per 24 hour   Intake --   Output 1600 ml   Net -1600 ml         Physical Exam  Vitals and nursing note reviewed.   Constitutional:       Appearance: He is well-developed. He is obese. He is ill-appearing.      Comments: At baseline mental status without return of confusion. On oxygen   Eyes:      Pupils: Pupils are equal, round, and reactive to light.   Cardiovascular:      Rate and Rhythm: Regular rhythm. Tachycardia present.      Comments: HR 90s  Pulmonary:      Effort: Pulmonary effort is normal.      Breath sounds: Rales (bibasilar)  present.      Comments: On NC. Speaking in full sentenecs without stopping for dyspnea. No crackles. No increased RR on exam. Breathing comfortably  Abdominal:      Palpations: Abdomen is soft.      Tenderness: There is no abdominal tenderness.   Musculoskeletal:         General: No tenderness.      Comments: Bandages in place. Wound vac with dark red output.   Skin:     General: Skin is warm and dry.      Comments: C/d/I dressing to R ankle with wound vac.    Neurological:      Mental Status: He is alert and oriented to person, place, and time.      Comments: Close to mental status baseline, much improved   Psychiatric:         Behavior: Behavior normal.             Significant Labs: All pertinent labs within the past 24 hours have been reviewed.  CBC:   Recent Labs   Lab 09/12/24 2215 09/13/24  0244 09/14/24  0337   WBC  --  18.63* 18.70*   HGB  --  7.9* 8.3*   HCT 22* 23.6* 24.4*   PLT  --  484* 590*     CMP:   Recent Labs   Lab 09/13/24  0244 09/14/24  0337   * 131*   K 4.0 4.1    105   CO2 16* 17*   GLU 79 154*   BUN 37* 46*   CREATININE 3.0* 3.3*   CALCIUM 7.7* 8.0*   PROT 5.9* 6.0   ALBUMIN 1.2* 1.2*   BILITOT 0.4 0.4   ALKPHOS 494* 413*   AST 80* 49*   ALT 7* 7*   ANIONGAP 10 9       Significant Imaging: I have reviewed all pertinent imaging results/findings within the past 24 hours.    Assessment/Plan:      * Surgical wound breakdown  Closed trimalleolar fracture of right ankle s/p ORIF in July  65 yo M presenting with AMS and worsening redness, swelling and pain to R ankle.     - continue oxacillin  - Ortho consulted:   - taken to OR 09/06 for debridement of surgical wound with wound vac placed. CX with group B strep and staph.   - Repeat I&D of R medial ankle on 09/09   - recommending plastics eval for free flap  Wound vac in place  - follow wound cultures -- Staph aureus and GBS  - follow blood cultures -- Staph aureus.   - ID following:  - Continue IV oxacillin 12 g q 24 hours continuous  infusion.  Monitor liver enzymes.   - Anticipate 6 weeks of IV antibiotics from date of most recent washout.    - Once blood cxs remain cleared for 72hrs, will consider adding adjunctive rifampin (isolate is susceptible) rifampin for biofilm penetration if hardware is to remain.  - Anticipate long term oral  antibiotic suppression following IV abx   - Will follow.    - Plastic consulted:   - CTA reviewed, given numerous areas of high grade stenosis, will need Vascular consult for possible angiogram to pre-optimiize blood flow prior to any free flap or pedicled propellar flap. Angiogram on hold for DEEPALI  - continue optimization of BG, endocrinology on board  - ID on board, continue  IV abx. Current wbc remains elevated but starting to improve 9/13  - orthopedics on board to assess for source control, possible hardware removal.  - malnutition: will need optimization prior to OR. Prealbumin and transferrin low on last check.   - Plan: after medically optimized, plan for flap closure of wound.   - Vascular surgery consulted; appreciate recs . Angiogram aborted 9/12 for mental status as well as 9/13 for DEEPALI. Will replan once Cr improves.    Hyperphosphatemia  Binder started 9/14 as elevated from DEEPALI.      Dysphagia  Coughing on exam witnesseed 9/13 with water and some secretions in airway on CT, speech eval placed and rec continue liquids at this time  Protnoix started as some reflux may be contributing, and doxy stopped 9/14 in case contributing also      Constipation  Had issue last admit with prolonged constipation requiring enemas  -on bowel regimen  -has some bowel gas seen throughout CT abdomen, will giev suppository 9/13 to stay ahead and BM reported      Bacteriuria  Seen on UA after delvalle for retention, per ID hold on teratment now as unclear if symptoms, reports 1 day of urine burning this week  -will f/u cx and has no growth      Airway malacia  Seen on CT, unclear cause as no hx of prologned intubation,  etc.  -continu supportiv care, nebs, IS, humidified oxygen      Acute retention of urine  Required delvalle 9/12 for retenetion, per nursing had had issues on/off the last few adys and had immediate return of >300 cc once placed and very concentrated dark urine. Urien with sediment now and darker yellow on exam 9/13. Questionable infectious process with bacerueia, hold treatment for now per ID and monitor. DEEPALI, unclear cause if from retention, renal consulted, will f/u trends      Acute metabolic encephalopathy  Likely from meds versed, fentanyl for anioggram that was aborted on 9/12 due to change in mental status with poor clearance with DEEPALI as change in metnal status drastically after this was given 9/12, CT head wnl, ammonia/lactate, ck okay  Much improving mental status 9/13. Watch closely      Leukocytosis  Starting to improve 9/13      Sinus tachycardia  Suspect from volume down, IVF with improvement from 110s to 90s on 9/13      PAD (peripheral artery disease)  Workup with vascular now, THEO, high grade stenosis on CTA, possible aniogram pending cr improvement      Acute blood loss anemia  Anemia is likely due to acute blood loss which was from surgery . Most recent hemoglobin and hematocrit are listed below.  Recent Labs     09/10/24  0439 09/11/24  1041 09/12/24  0537   HGB 8.4* 9.0* 8.5*   HCT 25.9* 27.1* 26.0*     Plan  - Monitor serial CBC: Daily  - Transfuse PRBC if patient becomes hemodynamically unstable, symptomatic or H/H drops below 7/21.  - Patient has not received any PRBC transfusions to date  - Patient's anemia is currently stable  -     Cough  Covering doxy for URI, on mucinex, mostly dry but now with some scant white/clear sputum on 9./13 and sputum resolved now and doxy stopped 9/;14 per ID      Transaminitis  - liver enzymes up and down, highest ast at 155, between   Now at 96-80---49  - will hold truvada for now  - monitor with daily CMP    On pre-exposure prophylaxis for HIV  - holding  truvada for transaminitis     MSSA bacteremia  - 2/2 blood cx with Staph aureus. 2/2 repeats positive. Repeats pending but NGTD  - Follow repeat blood cx  - ID following: start continuous oxacillin   - echo without concern for vegetations     Sepsis  This patient does have evidence of infective focus  My overall impression is sepsis.  Source: Skin and Soft Tissue (location ankle)  Antibiotics given-   Antibiotics (72h ago, onward)      Start     Stop Route Frequency Ordered    09/09/24 1730  oxacillin 12 g in  mL CONTINUOUS INFUSION         -- IV Every 24 hours (non-standard times) 09/09/24 1630          Latest lactate reviewed-  Recent Labs   Lab 09/12/24  1618 09/12/24  2216   LACTATE 0.6  --    POCLAC  --  <0.30*       Organ dysfunction indicated by Acute kidney injury and Encephalopathy    Fluid challenge Actual Body weight- Patient will receive 30ml/kg actual body weight to calculate fluid bolus for treatment of septic shock.     Post- resuscitation assessment No - Post resuscitation assessment not needed     Will Not start Pressors-   Source control achieved by: see above  -secondary to surgical wound infeection in right ankle, with 9/6 wound Cx with group B strep and staph with bacteremia with 9/7 and 9/6  blood with staph with 9/8 and 9/10 blood Cx NGTD  ID following. Echo without signs of vegtations. On oxacillin now. Wbc still higher at 22, and ID following, procal 2 on 9/12-->1 on 9/13. CRp improved to 235. Wbc starting to improve at 18 now. .doxy stopped given no PNA signs on CT. Reached out to ortho on 9/14 regarding ankle as no obvious other source of elevated wbc per discussion with ID to see if any plans to reassess wound under vac, last op note had necrosis and no obvious purulence.     Hyponatremia  Hyponatremia is likely due to Dehydration/hypovolemia. The patient's most recent sodium results are listed below.  Recent Labs     09/11/24  1041 09/12/24  0538 09/13/24  0244   * 131* 131*        Plan  - Correct the sodium by 4-6mEq in 24 hours.   - Obtain the following studies: Urine sodium, urine osmolality, serum osmolality.  - Will treat the hyponatremia with IV fluids as follows: NS  - Monitor sodium Daily.   - Patient hyponatremia is similar to prev day, osm low normal ranges, urine osm pending  Last admit had similar low and improved with volume    Uncontrolled diabetes mellitus with hyperglycemia  Patient's FSGs are not controlled on current hypoglycemics.   Last A1c reviewed-   Lab Results   Component Value Date    LABA1C 10.8 (H) 08/15/2016    HGBA1C 8.5 (H) 09/06/2024     Most recent fingerstick glucose reviewed-   Recent Labs   Lab 09/12/24  2244 09/13/24  0452 09/13/24  0722 09/13/24  1059   POCTGLUCOSE 109 88 107 137*       Current correctional scale  Low  Maintain anti-hyperglycemic dose as follows-   Antihyperglycemics (From admission, onward)      Start     Stop Route Frequency Ordered    09/13/24 2100  insulin glargine U-100 (Lantus) pen 18 Units         -- SubQ Nightly 09/13/24 0730    09/13/24 0830  insulin aspart U-100 pen 0-10 Units         -- SubQ Every 4 hours PRN 09/13/24 0731           - Endocrine consulted:  - At this time, recommend that the patient remain off of his pump since he cannot be controlled in the Auto mode. Patient does not interact with pump frequently and relies on the auto mode algorithm.   - Lantus (Insulin Glargine) 35   - Novolog (Insulin Aspart) 9 units TIDWM (20% decrease given post-prandial BG below goal) (Hold if NPO) and prn for BG excursions MDC SSI (150/25)   - hold oral antihyperglycemics during hospitalization   - needs better BG control for optimal wound healing   Doses decreased on 9/12 for hypoglycemia this AM with endo managing and hypoglycemia again 9/13 in early AM overnight prior and endo adjusting further. On bariatric clear until speech eval    Closed trimalleolar fracture of right ankle  S/p ORIF 07/2024  Ortho consulted   - see surgical  wound breakdown above  NWB    Acute kidney injury (DEEPALI) with acute tubular necrosis (ATN)  DEEPALI is likely due to pre-renal azotemia due to dehydration. Baseline creatinine is  1 . Most recent creatinine and eGFR are listed below.  Recent Labs     09/12/24  0538 09/13/24  0244 09/14/24  0337   CREATININE 2.6* 3.0* 3.3*   EGFRNORACEVR 26.9* 22.6* 20.2*        Plan  - DEEPALI is worsening  - Avoid nephrotoxins and renally dose meds for GFR listed above  - Monitor urine output, serial BMP, and adjust therapy as needed  - baseline 1.6--2.1--2.6 now. EF on prev echo 70% so signs of volume depletion, as well as hyponatremia and tachycardia. Resume IVF 9/12   Renal consulted, rec stop IVF 9/13, ATN on urine microscopy with casts, secondary to sepsis/contrast likely combination and cr not at plateau yet with Cr 3.3 on 9/14 and supportive care in interim.    Acute hypoxemic respiratory failure  Patient with Hypoxic Respiratory failure which is Acute.  he is not on home oxygen. Supplemental oxygen was provided and noted-      .   Signs/symptoms of respiratory failure include- tachypnea and lethargy. Contributing diagnoses includes - Obesity Hypoventilation and Pneumonia Labs and images were reviewed. Patient Has not had a recent ABG. Will treat underlying causes and adjust management of respiratory failure as follows-     - ABG on 9/12 without major abnormality, P02 89, [id number], continue oxygen now, CT chest showing tracheomalachia/hyperinflation lungs. Has no hx of excessive intubation or lung dx known to him. Using IS on exam 9/13 and speaking in full sentences on exam, with small amount of clear/white sputum. No obvious signs of volume overload, some cough on exam and keep on liquid diet per speech recs. Stop doxy on 9/14 as no signs of PNA on exam and could be contributor to some of dysphagia per ID. Wean oxygen as tolerates, sputum resolved per patient. Breathing comfortable on exam      - CTA ordered by ortho/anesthesia prior  "to I&D. Negative for PE, but small ground glass area to RUL representing infectious vs. Inflammatory process.  - doxy added for pneumonia coverage.  - mucinex BID, acapella, and nebulized saline . Has IS at bedside on exam 9/12  - prn duo nebs   9/8: no signs of overload. Left Ventricle: The left ventricle is normal in size. Normal wall thickness. There is normal systolic function with a visually estimated ejection fraction of 65 - 70%. There is normal diastolic function.    Right Ventricle: Normal right ventricular cavity size. Wall thickness is normal. Systolic function is normal.    IVC/SVC: Normal venous pressure at 3 mmHg.    No evidence of intracardiac mass or vegetation.    Metabolic acidosis  Likely secondary to DEEPALI  Start Na bicarb on 9/12      Uncontrolled type 2 diabetes mellitus with diabetic polyneuropathy, with long-term current use of insulin  Patient's FSGs are uncontrolled due to hyperglycemia on current medication regimen.  Last A1c reviewed-   Lab Results   Component Value Date    LABA1C 10.8 (H) 08/15/2016    HGBA1C 9.9 (H) 07/18/2024     Most recent fingerstick glucose reviewed- No results for input(s): "POCTGLUCOSE" in the last 24 hours.  Current correctional scale  Low  Maintain anti-hyperglycemic dose as follows-   Antihyperglycemics (From admission, onward)      Start     Stop Route Frequency Ordered    09/06/24 0506  insulin aspart U-100 pen 0-5 Units         -- SubQ Every 6 hours PRN 09/06/24 0406          Hold Oral hypoglycemics while patient is in the hospital.      VTE Risk Mitigation (From admission, onward)           Ordered     enoxaparin injection 30 mg  Daily         09/13/24 0757     IP VTE HIGH RISK PATIENT  Once         09/06/24 1735                    Discharge Planning   JAYLEEN: 9/16/2024     Code Status: Full Code   Is the patient medically ready for discharge?:     Reason for patient still in hospital (select all that apply): Patient trending condition  Discharge Plan A: Home " Health                  Maria Del Rosario Medina MD  Department of Hospital Medicine   [facility] - Surgery

## 2024-09-15 LAB
ALBUMIN SERPL BCP-MCNC: 1.3 G/DL (ref 3.5–5.2)
ALP SERPL-CCNC: 361 U/L (ref 55–135)
ALT SERPL W/O P-5'-P-CCNC: 7 U/L (ref 10–44)
ANION GAP SERPL CALC-SCNC: 10 MMOL/L (ref 8–16)
AST SERPL-CCNC: 39 U/L (ref 10–40)
BACTERIA BLD CULT: NORMAL
BILIRUB SERPL-MCNC: 0.4 MG/DL (ref 0.1–1)
BUN SERPL-MCNC: 43 MG/DL (ref 8–23)
CALCIUM SERPL-MCNC: 7.9 MG/DL (ref 8.7–10.5)
CHLORIDE SERPL-SCNC: 104 MMOL/L (ref 95–110)
CO2 SERPL-SCNC: 16 MMOL/L (ref 23–29)
CREAT SERPL-MCNC: 2.7 MG/DL (ref 0.5–1.4)
CRP SERPL-MCNC: 188.6 MG/L (ref 0–8.2)
ERYTHROCYTE [DISTWIDTH] IN BLOOD BY AUTOMATED COUNT: 15.8 % (ref 11.5–14.5)
EST. GFR  (NO RACE VARIABLE): 25.7 ML/MIN/1.73 M^2
GLUCOSE SERPL-MCNC: 154 MG/DL (ref 70–110)
HCT VFR BLD AUTO: 25.2 % (ref 40–54)
HGB BLD-MCNC: 8.6 G/DL (ref 14–18)
MCH RBC QN AUTO: 28.6 PG (ref 27–31)
MCHC RBC AUTO-ENTMCNC: 34.1 G/DL (ref 32–36)
MCV RBC AUTO: 84 FL (ref 82–98)
PHOSPHATE SERPL-MCNC: 5.1 MG/DL (ref 2.7–4.5)
PLATELET # BLD AUTO: 651 K/UL (ref 150–450)
PMV BLD AUTO: 10 FL (ref 9.2–12.9)
POCT GLUCOSE: 152 MG/DL (ref 70–110)
POCT GLUCOSE: 155 MG/DL (ref 70–110)
POCT GLUCOSE: 224 MG/DL (ref 70–110)
POTASSIUM SERPL-SCNC: 4.4 MMOL/L (ref 3.5–5.1)
PROT SERPL-MCNC: 6.2 G/DL (ref 6–8.4)
RBC # BLD AUTO: 3.01 M/UL (ref 4.6–6.2)
SODIUM SERPL-SCNC: 130 MMOL/L (ref 136–145)
WBC # BLD AUTO: 15.08 K/UL (ref 3.9–12.7)

## 2024-09-15 PROCEDURE — 21400001 HC TELEMETRY ROOM

## 2024-09-15 PROCEDURE — 36415 COLL VENOUS BLD VENIPUNCTURE: CPT | Performed by: HOSPITALIST

## 2024-09-15 PROCEDURE — 99900035 HC TECH TIME PER 15 MIN (STAT)

## 2024-09-15 PROCEDURE — 84100 ASSAY OF PHOSPHORUS: CPT | Performed by: HOSPITALIST

## 2024-09-15 PROCEDURE — 99233 SBSQ HOSP IP/OBS HIGH 50: CPT | Mod: ,,, | Performed by: NURSE PRACTITIONER

## 2024-09-15 PROCEDURE — 25000003 PHARM REV CODE 250: Performed by: HOSPITALIST

## 2024-09-15 PROCEDURE — 63600175 PHARM REV CODE 636 W HCPCS: Performed by: STUDENT IN AN ORGANIZED HEALTH CARE EDUCATION/TRAINING PROGRAM

## 2024-09-15 PROCEDURE — 80053 COMPREHEN METABOLIC PANEL: CPT | Performed by: HOSPITALIST

## 2024-09-15 PROCEDURE — 86140 C-REACTIVE PROTEIN: CPT | Performed by: HOSPITALIST

## 2024-09-15 PROCEDURE — 27000221 HC OXYGEN, UP TO 24 HOURS

## 2024-09-15 PROCEDURE — 25000003 PHARM REV CODE 250

## 2024-09-15 PROCEDURE — 63600175 PHARM REV CODE 636 W HCPCS

## 2024-09-15 PROCEDURE — 94761 N-INVAS EAR/PLS OXIMETRY MLT: CPT

## 2024-09-15 PROCEDURE — 85027 COMPLETE CBC AUTOMATED: CPT | Performed by: HOSPITALIST

## 2024-09-15 PROCEDURE — 25000003 PHARM REV CODE 250: Performed by: STUDENT IN AN ORGANIZED HEALTH CARE EDUCATION/TRAINING PROGRAM

## 2024-09-15 PROCEDURE — 25000003 PHARM REV CODE 250: Performed by: PHYSICIAN ASSISTANT

## 2024-09-15 PROCEDURE — 63600175 PHARM REV CODE 636 W HCPCS: Performed by: HOSPITALIST

## 2024-09-15 RX ORDER — SODIUM BICARBONATE 650 MG/1
1300 TABLET ORAL 3 TIMES DAILY
Status: DISCONTINUED | OUTPATIENT
Start: 2024-09-15 | End: 2024-09-22

## 2024-09-15 RX ORDER — AMLODIPINE BESYLATE 10 MG/1
10 TABLET ORAL DAILY
Status: DISCONTINUED | OUTPATIENT
Start: 2024-09-16 | End: 2024-10-04 | Stop reason: HOSPADM

## 2024-09-15 RX ORDER — SUCRALFATE 1 G/10ML
1 SUSPENSION ORAL EVERY 6 HOURS
Status: DISCONTINUED | OUTPATIENT
Start: 2024-09-15 | End: 2024-10-04 | Stop reason: HOSPADM

## 2024-09-15 RX ORDER — AMLODIPINE BESYLATE 5 MG/1
5 TABLET ORAL DAILY
Status: DISCONTINUED | OUTPATIENT
Start: 2024-09-15 | End: 2024-09-15

## 2024-09-15 RX ADMIN — SEVELAMER CARBONATE 800 MG: 800 TABLET, FILM COATED ORAL at 12:09

## 2024-09-15 RX ADMIN — NORTRIPTYLINE HYDROCHLORIDE 50 MG: 25 CAPSULE ORAL at 09:09

## 2024-09-15 RX ADMIN — HYDRALAZINE HYDROCHLORIDE 25 MG: 25 TABLET ORAL at 09:09

## 2024-09-15 RX ADMIN — ACETAMINOPHEN 500 MG: 500 TABLET ORAL at 08:09

## 2024-09-15 RX ADMIN — METHOCARBAMOL 500 MG: 500 TABLET ORAL at 08:09

## 2024-09-15 RX ADMIN — SODIUM BICARBONATE 650 MG TABLET 1300 MG: at 09:09

## 2024-09-15 RX ADMIN — SEVELAMER CARBONATE 800 MG: 800 TABLET, FILM COATED ORAL at 04:09

## 2024-09-15 RX ADMIN — AMLODIPINE BESYLATE 5 MG: 5 TABLET ORAL at 08:09

## 2024-09-15 RX ADMIN — SEVELAMER CARBONATE 800 MG: 800 TABLET, FILM COATED ORAL at 08:09

## 2024-09-15 RX ADMIN — MORPHINE SULFATE 2 MG: 2 INJECTION, SOLUTION INTRAMUSCULAR; INTRAVENOUS at 12:09

## 2024-09-15 RX ADMIN — SENNOSIDES 8.6 MG: 8.6 TABLET, FILM COATED ORAL at 09:09

## 2024-09-15 RX ADMIN — OXACILLIN 12 G: 2 INJECTION, POWDER, FOR SOLUTION INTRAMUSCULAR; INTRAVENOUS at 09:09

## 2024-09-15 RX ADMIN — MORPHINE SULFATE 2 MG: 2 INJECTION, SOLUTION INTRAMUSCULAR; INTRAVENOUS at 04:09

## 2024-09-15 RX ADMIN — GUAIFENESIN 400 MG: 200 SOLUTION ORAL at 05:09

## 2024-09-15 RX ADMIN — SUCRALFATE 1 G: 1 SUSPENSION ORAL at 04:09

## 2024-09-15 RX ADMIN — GUAIFENESIN 400 MG: 200 SOLUTION ORAL at 11:09

## 2024-09-15 RX ADMIN — SODIUM BICARBONATE 650 MG TABLET 1300 MG: at 04:09

## 2024-09-15 RX ADMIN — POLYETHYLENE GLYCOL 3350 17 G: 17 POWDER, FOR SOLUTION ORAL at 09:09

## 2024-09-15 RX ADMIN — PANTOPRAZOLE SODIUM 40 MG: 40 TABLET, DELAYED RELEASE ORAL at 08:09

## 2024-09-15 RX ADMIN — ENOXAPARIN SODIUM 30 MG: 30 INJECTION SUBCUTANEOUS at 04:09

## 2024-09-15 RX ADMIN — SUCRALFATE 1 G: 1 SUSPENSION ORAL at 11:09

## 2024-09-15 RX ADMIN — INSULIN GLARGINE 18 UNITS: 100 INJECTION, SOLUTION SUBCUTANEOUS at 09:09

## 2024-09-15 RX ADMIN — SODIUM BICARBONATE 650 MG TABLET 1300 MG: at 08:09

## 2024-09-15 RX ADMIN — SENNOSIDES 8.6 MG: 8.6 TABLET, FILM COATED ORAL at 08:09

## 2024-09-15 RX ADMIN — INSULIN ASPART 2 UNITS: 100 INJECTION, SOLUTION INTRAVENOUS; SUBCUTANEOUS at 08:09

## 2024-09-15 RX ADMIN — INSULIN ASPART 4 UNITS: 100 INJECTION, SOLUTION INTRAVENOUS; SUBCUTANEOUS at 04:09

## 2024-09-15 RX ADMIN — POLYETHYLENE GLYCOL 3350 17 G: 17 POWDER, FOR SOLUTION ORAL at 08:09

## 2024-09-15 RX ADMIN — SUCRALFATE 1 G: 1 SUSPENSION ORAL at 12:09

## 2024-09-15 NOTE — ASSESSMENT & PLAN NOTE
- liver enzymes up and down, highest ast at 155, between   Now at 96-80---49 and now normalized  - will hold truvada for now  - monitor with daily CMP

## 2024-09-15 NOTE — ASSESSMENT & PLAN NOTE
Had issue last admit with prolonged constipation requiring enemas  -on bowel regimen  -has some bowel gas seen throughout CT abdomen, will giev suppository 9/13 to stay ahead and BM reported and 2 BMs 9/14

## 2024-09-15 NOTE — SUBJECTIVE & OBJECTIVE
Interval History:  No acute events overnight. Remains afebrile.   Still with leukocytosis - 18. Slow to trend down despite 8 days of IV abx, ankle washout X2, resolution of bacteremia. No evidence of PNA on CT. Urine cx negative.   Creatinine still trending up.  Granular casts on microscopy consistent with ATN. No hydronephrosis on US.   Because of DEEPALI, angiogram still on hold.  Patient very discouraged with this.   Still on liquids.  Still complaining of globus sensation   Cough somewhat better.     Review of Systems   Respiratory:  Positive for cough.    Gastrointestinal:  Positive for nausea.   Musculoskeletal:  Positive for arthralgias and myalgias.   Skin:  Positive for color change and wound.   All other systems reviewed and are negative.    Objective:     Vital Signs (Most Recent):  Temp: 98.1 °F (36.7 °C) (09/14/24 1544)  Pulse: 110 (09/14/24 1632)  Resp: 18 (09/14/24 1632)  BP: (!) 171/79 (09/14/24 1544)  SpO2: 95 % (09/14/24 1632) Vital Signs (24h Range):  Temp:  [97.9 °F (36.6 °C)-98.5 °F (36.9 °C)] 98.1 °F (36.7 °C)  Pulse:  [] 110  Resp:  [16-18] 18  SpO2:  [94 %-100 %] 95 %  BP: (131-171)/(72-82) 171/79     Weight: 93 kg (205 lb 0.4 oz)  Body mass index is 34.12 kg/m².    Estimated Creatinine Clearance: 24 mL/min (A) (based on SCr of 3.3 mg/dL (H)).     Physical Exam  Vitals and nursing note reviewed.   Constitutional:       General: He is not in acute distress.     Appearance: Normal appearance. He is not ill-appearing, toxic-appearing or diaphoretic.   HENT:      Nose: No congestion.      Mouth/Throat:      Pharynx: No oropharyngeal exudate.   Eyes:      General: No scleral icterus.     Conjunctiva/sclera: Conjunctivae normal.   Cardiovascular:      Rate and Rhythm: Normal rate and regular rhythm.      Heart sounds: No murmur heard.  Pulmonary:      Effort: Pulmonary effort is normal. No respiratory distress.      Breath sounds: No wheezing.      Comments: No appreciable crackles today  O2  nasal cannula  No increased WOB or tachypnea    Musculoskeletal:      Cervical back: Neck supple. No tenderness.      Comments: Right ankle with surgical dressings in place + wound vac   Skin:     General: Skin is warm and dry.   Neurological:      Mental Status: He is alert and oriented to person, place, and time.   Psychiatric:         Mood and Affect: Mood normal.         Behavior: Behavior normal.        Significant Labs: Blood Culture:   Recent Labs   Lab 09/06/24  0235 09/07/24  0910 09/08/24  1641 09/10/24  1711   LABBLOO Gram stain aer bottle: Gram positive cocci in clusters resembling Staph  Gram stain alicia bottle: Gram positive cocci in clusters resembling Staph  Results called to and read back by:Luciana Altamirano RN 09/06/2024  20:52  STAPHYLOCOCCUS AUREUS*  Gram stain aer bottle: Gram positive cocci in clusters resembling Staph  Gram stain alicia bottle: Gram positive cocci in clusters resembling Staph  Results called to and read back by:Luciana Altamirano RN 09/06/2024  20:55  STAPHYLOCOCCUS AUREUS  For susceptibility see order #[id number]  * Gram stain aer bottle: Gram positive cocci in clusters resembling Staph  Results called to and read back by: Roseanne Guevara RN 09/08/2024  14:07  Gram stain alicia bottle: Gram positive cocci in clusters resembling Staph  Positive results previously called 09/08/2024  STAPHYLOCOCCUS AUREUS  ID consult required at [facility].  For susceptibility see order #[id number]  *  Gram stain aer bottle: Gram positive cocci in clusters resembling Staph  Gram stain alicia bottle: Gram positive cocci in clusters resembling Staph  Results called to and read back by:Luciana Altamirano LPN 09/08/2024  03:59  STAPHYLOCOCCUS AUREUS  ID consult required at [facility].  For susceptibility see order #[id number]  * No growth after 5 days.  No growth after 5 days. No Growth to date  No Growth to date  No Growth to  date  No Growth to date     CBC:   Recent Labs   Lab 09/12/24  2215 09/13/24  0244 09/14/24  0337   WBC  --  18.63* 18.70*   HGB  --  7.9* 8.3*   HCT 22* 23.6* 24.4*   PLT  --  484* 590*     CMP:   Recent Labs   Lab 09/13/24  0244 09/14/24  0337   * 131*   K 4.0 4.1    105   CO2 16* 17*   GLU 79 154*   BUN 37* 46*   CREATININE 3.0* 3.3*   CALCIUM 7.7* 8.0*   PROT 5.9* 6.0   ALBUMIN 1.2* 1.2*   BILITOT 0.4 0.4   ALKPHOS 494* 413*   AST 80* 49*   ALT 7* 7*   ANIONGAP 10 9     Microbiology Results (last 7 days)       Procedure Component Value Units Date/Time    Urine culture [[id number]] Collected: 09/12/24 1543    Order Status: Completed Specimen: Urine Updated: 09/13/24 2309     Urine Culture, Routine No growth    Narrative:      add on Urine Creatinine and Urine Protein pe Hernandez NP/order#[id number] on 09/13/2024 @0025.    Specimen Source->Urine    Blood culture [[id number]] Collected: 09/10/24 1711    Order Status: Completed Specimen: Blood Updated: 09/13/24 2012     Blood Culture, Routine No Growth to date      No Growth to date      No Growth to date      No Growth to date    Blood culture [[id number]] Collected: 09/08/24 1641    Order Status: Completed Specimen: Blood Updated: 09/13/24 2012     Blood Culture, Routine No growth after 5 days.    Narrative:      [id number]    Blood culture [[id number]] Collected: 09/08/24 1641    Order Status: Completed Specimen: Blood Updated: 09/13/24 2012     Blood Culture, Routine No growth after 5 days.    Narrative:      [id number]    Culture, Anaerobe [[id number]] Collected: 09/06/24 1446    Order Status: Completed Specimen: Incision site from Ankle, Right Updated: 09/12/24 1315     Anaerobic Culture No anaerobes isolated    Narrative:      Right ankle wound - culture    Blood culture x two cultures. Draw prior to antibiotics. [[id number]]  (Abnormal)  (Susceptibility) Collected: 09/06/24 0235    Order Status: Completed Specimen: Blood from Peripheral,  Antecubital, Left Updated: 09/11/24 1203     Blood Culture, Routine Gram stain aer bottle: Gram positive cocci in clusters resembling Staph      Gram stain alicia bottle: Gram positive cocci in clusters resembling Staph      Results called to and read back by:Luciana Altamirano RN 09/06/2024      20:52      STAPHYLOCOCCUS AUREUS    Narrative:      Aerobic and anaerobic    Blood culture [[id number]]  (Abnormal) Collected: 09/07/24 0910    Order Status: Completed Specimen: Blood Updated: 09/10/24 1000     Blood Culture, Routine Gram stain aer bottle: Gram positive cocci in clusters resembling Staph      Results called to and read back by: Roseanne Guevara RN 09/08/2024  14:07      Gram stain alicia bottle: Gram positive cocci in clusters resembling Staph      Positive results previously called 09/08/2024      STAPHYLOCOCCUS AUREUS  ID consult required at [facility].  For susceptibility see order #[id number]      Blood culture [[id number]]  (Abnormal) Collected: 09/07/24 0910    Order Status: Completed Specimen: Blood Updated: 09/10/24 0959     Blood Culture, Routine Gram stain aer bottle: Gram positive cocci in clusters resembling Staph      Gram stain alicia bottle: Gram positive cocci in clusters resembling Staph      Results called to and read back by:Luciana Altamirano LPN 09/08/2024      03:59      STAPHYLOCOCCUS AUREUS  ID consult required at [facility].  For susceptibility see order #[id number]      AFB Culture & Smear [[id number]] Collected: 09/06/24 1446    Order Status: Completed Specimen: Incision site from Ankle, Right Updated: 09/09/24 1545     AFB Culture & Smear Culture in progress     AFB CULTURE STAIN No acid fast bacilli seen.    Narrative:      Right ankle wound - culture    Aerobic culture [[id number]]  (Abnormal)  (Susceptibility) Collected: 09/06/24 1446    Order Status: Completed Specimen: Incision site from Ankle, Right Updated: 09/09/24 1431      Aerobic Bacterial Culture STAPHYLOCOCCUS AUREUS  Many        STREPTOCOCCUS AGALACTIAE (GROUP B)  Many  Beta-hemolytic streptococci are routinely susceptible to   penicillins,cephalosporins and carbapenems.      Narrative:      Right ankle wound - culture    Fungus culture [[id number]] Collected: 09/06/24 1446    Order Status: Completed Specimen: Incision site from Ankle, Right Updated: 09/09/24 1111     Fungus (Mycology) Culture Culture in progress    Narrative:      Right ankle wound - culture    Blood culture x two cultures. Draw prior to antibiotics. [[id number]]  (Abnormal) Collected: 09/06/24 0235    Order Status: Completed Specimen: Blood from Peripheral, Antecubital, Left Updated: 09/08/24 0713     Blood Culture, Routine Gram stain aer bottle: Gram positive cocci in clusters resembling Staph      Gram stain alicia bottle: Gram positive cocci in clusters resembling Staph      Results called to and read back by:Luciana Altamirano RN 09/06/2024      20:55      STAPHYLOCOCCUS AUREUS  For susceptibility see order #[id number]      Narrative:      Aerobic and anaerobic          Wound Culture:   Recent Labs   Lab 09/06/24  1446   LABAERO STAPHYLOCOCCUS AUREUS  Many  *  STREPTOCOCCUS AGALACTIAE (GROUP B)  Many  Beta-hemolytic streptococci are routinely susceptible to   penicillins,cephalosporins and carbapenems.  *       Significant Imaging: I have reviewed all pertinent imaging results/findings within the past 24 hours.

## 2024-09-15 NOTE — ASSESSMENT & PLAN NOTE
DEEPALI is likely due to pre-renal azotemia due to dehydration. Baseline creatinine is  1 . Most recent creatinine and eGFR are listed below.  Recent Labs     09/13/24  0244 09/14/24  0337 09/15/24  0241   CREATININE 3.0* 3.3* 2.7*   EGFRNORACEVR 22.6* 20.2* 25.7*        Plan  - DEEPALI is worsening  - Avoid nephrotoxins and renally dose meds for GFR listed above  - Monitor urine output, serial BMP, and adjust therapy as needed  - baseline 1.6--2.1--2.6 now. EF on prev echo 70% so signs of volume depletion, as well as hyponatremia and tachycardia. Resume IVF 9/12   Renal consulted, rec stop IVF 9/13, ATN on urine microscopy with casts, secondary to sepsis/contrast likely combination and cr not at plateau yet with Cr 3.3 on 9/14 and supportive care in interim.  Improving now at 2.7 on 9/15 with output picking up at 1.3 L.

## 2024-09-15 NOTE — PLAN OF CARE
Problem: Adult Inpatient Plan of Care  Goal: Plan of Care Review  Outcome: Progressing  Goal: Patient-Specific Goal (Individualized)  Outcome: Progressing  Goal: Absence of Hospital-Acquired Illness or Injury  Outcome: Progressing  Goal: Optimal Comfort and Wellbeing  Outcome: Progressing  Goal: Readiness for Transition of Care  Outcome: Progressing     Problem: Fall Injury Risk  Goal: Absence of Fall and Fall-Related Injury  Outcome: Progressing     Problem: Sepsis/Septic Shock  Goal: Optimal Coping  Outcome: Progressing  Goal: Absence of Bleeding  Outcome: Progressing  Goal: Blood Glucose Level Within Targeted Range  Outcome: Progressing  Goal: Absence of Infection Signs and Symptoms  Outcome: Progressing  Goal: Optimal Nutrition Intake  Outcome: Progressing     Problem: Acute Kidney Injury/Impairment  Goal: Fluid and Electrolyte Balance  Outcome: Progressing  Goal: Improved Oral Intake  Outcome: Progressing  Goal: Effective Renal Function  Outcome: Progressing     Problem: Wound  Goal: Optimal Coping  Outcome: Progressing  Goal: Optimal Functional Ability  Outcome: Progressing  Goal: Absence of Infection Signs and Symptoms  Outcome: Progressing  Goal: Improved Oral Intake  Outcome: Progressing  Goal: Optimal Pain Control and Function  Outcome: Progressing  Goal: Skin Health and Integrity  Outcome: Progressing  Goal: Optimal Wound Healing  Outcome: Progressing     Problem: Diabetes Comorbidity  Goal: Blood Glucose Level Within Targeted Range  Outcome: Progressing     Problem: Skin Injury Risk Increased  Goal: Skin Health and Integrity  Outcome: Progressing     Problem: Infection  Goal: Absence of Infection Signs and Symptoms  Outcome: Progressing   He had a good day.   Pain is well controled.   He remains aaox4 and pleasant.   The bed is in the lowest position and the call light is within reach.

## 2024-09-15 NOTE — PROGRESS NOTES
[facility] - Surgery  Infectious Disease  Progress Note    Patient Name: Cortes Schroeder  MRN: [id number]  Admission Date: 9/6/2024  Length of Stay: 8 days  Attending Physician: Maria Del Rosario Medina MD  Primary Care Provider: Andrew Sinclair Jr., MD    Isolation Status: No active isolations  Assessment/Plan:      ID  Surgical site infection    64 year old with poorly controlled DM (A1C 8.5), recent fall resulting in left wrist fx and right ankle fracture 07/18/24 s/p ex fix Rt ankle and ORIF left wrist 07/19, and later subsequent ORIF right ankle on 7/26/24 with Dr. Liz -- now c/b deep surgical site infection of right ankle involving underlying hardware with associated bacteremia.   No SOI in left wrist.      S/p I&D on 09/06 and 9/9.  Op report noting purulence overlying hardware.  No plans for hardware removal at this time.  Unable to achieve primary closure and Plastic Surgery consulted.   A CTA of  lower extremity showed multifocal high grade stenosis throughout right calf, though no occlusion and Vascular Surgery consulted. Right TBI .41. Attempted angiogram 9/12, but developed extreme confusion/AMS with versed, rapid respiratory rate with versed/fentanyl and procedure postponed.   Further plans for angiogram still deferred for now given DEEPALI (Nephrology following)  Plastics recommendations are pending Vascular workup.      Surgical cxs +GBS, MSSA.  Blood cultures 9/6 and 9/7 + for MSSA.  Repeat blood cultures 9/8, 9/11 NGTD.  2D echo negative.   Remains on IV oxacillin for MSSA.  Afebrile.  WBC still elevated - 18.  Liver enzymes improved.    Recommendations / Plan:  Continue IV oxacillin 12 g q 24 hours continuous infusion.   Monitor creatinine clearance.  No dosage adjustment currently recommended, though neurotoxicity has been reported for cr cl <10.   Anticipate 6 weeks of IV antibiotics from date of most recent washout.    Will consider adding adjunctive rifampin for biofilm penetration if hardware is  to remain -  though may not be able to use concurrently with truvada (decreases effectiveness of truvada).    Follow WBC  Follow up Vascular/Plastics/Orthpedic plans  So long as hardware remains, anticipate long term oral antibiotic suppression following course of IV abx   Will follow    Data reviewed and plan discussed with ID staff  Secure chat/Discussed above plan with Primary Team      Thank you.   Please Secure Chat for any questions or concerns.  ISELA Alexis, ANP-C  Infectious Disease     Subjective:     Principal Problem:Surgical wound breakdown    HPI: Cortes Schroeder is a 64 year old with HTN, poorly controlled DM (A1C 8.5), recent history of syncopal episodes, and  right ankle fracture on 7/18/24 s/p ex fix with subsequent ORIF on 7/26/24 with Dr. Liz.  At outpatient Orthopedic follow up surgical wound was healing and sutures removed.  Since then patient  reportedly doing well until about 1 week ago when he noticed some drainage from his surgical incisions.  Over the past week he developed fevers, chills, and night sweats.  Yesterday he became confused/altered and was brought to ED.  In ED was hypotensive, tachycardic, WBC 18, .  Sepsis protocol initiated and started on clindamycin has been doing well postoperatively until around 1 week ago when he started noticing some drainage from his surgical incisions. He started to develop fevers, chills and night sweats over the past week. Yesterday afternoon, patient became altered and was brought to the ED by his roommate. Patient currently lives in [place]. Upon presentation to the ED, patient was tachycardic, hypotensive and afebrile. WBC of 18.68, .3. Sepsis protocol initiated. Patient was started on clindamycin and vancomycin.  Now on vancomycin and cefepime.       Orthopedics consulted.  Noted draining wound over the medial side of the right ankle as well as eschar formation over the lateral side of the ankle.  Right ankle and foot  noted to be erythematous and edematous.  Pending surgical I&D today.     I have reviewed hospital notes from  HM service and other specialty providers. I have also reviewed CBC, CMP/BMP,  cultures and imaging with my interpretation as documented.      64M with poorly controlled DM (A1C 8.5), recent right ankle fracture 07/18/24 s/p ex fix with subsequent ORIF on 7/26/24 with Dr. Liz -- now c/b deep surgical site infection involving underlying hardware. Pt arrived S/p I&D 09/06. Op report noting purulence overlying hardware, cxs sent, gram stain +GPCs. Index bld cxs 09/06 +Staph aureus (BCID MRSA neg). Repeat bld cxs 09/07 NGTD. Post-op day 1 developed fever, with tachycardia 120, and ongoing AMS. Pt continuing on Iv-vanc and escalated cefepime to zosyn.    Ortho plans to return to OR Monday 09/09 for repeat I&D with Plastic surgery assistance for closure.    Recommendations / Plan:  Continue Iv-vanc and zosyn.   Will f/u repeat bld cxs 09/07 to ensure cleared  Will f/u surgical cxs and adjust abx accordingly  Recommend TTE  Anticipate abx duration of 6wks s/p 09/09, DON 10/21.      -- Discussed with ID staff and primary team   -- ID will continue to follow w/ further recs.          Interval History:  No acute events overnight. Remains afebrile.   Still with leukocytosis - 18. Slow to trend down despite 8 days of IV abx, ankle washout X2, resolution of bacteremia. No evidence of PNA on CT. Urine cx negative.   Creatinine still trending up.  Granular casts on microscopy consistent with ATN. No hydronephrosis on US.   Because of DEEPALI, angiogram still on hold.  Patient very discouraged with this.   Still on liquids.  Still complaining of globus sensation   Cough somewhat better.     Review of Systems   Respiratory:  Positive for cough.    Gastrointestinal:  Positive for nausea.   Musculoskeletal:  Positive for arthralgias and myalgias.   Skin:  Positive for color change and wound.   All other systems reviewed and are  negative.    Objective:     Vital Signs (Most Recent):  Temp: 98.1 °F (36.7 °C) (09/14/24 1544)  Pulse: 110 (09/14/24 1632)  Resp: 18 (09/14/24 1632)  BP: (!) 171/79 (09/14/24 1544)  SpO2: 95 % (09/14/24 1632) Vital Signs (24h Range):  Temp:  [97.9 °F (36.6 °C)-98.5 °F (36.9 °C)] 98.1 °F (36.7 °C)  Pulse:  [] 110  Resp:  [16-18] 18  SpO2:  [94 %-100 %] 95 %  BP: (131-171)/(72-82) 171/79     Weight: 93 kg (205 lb 0.4 oz)  Body mass index is 34.12 kg/m².    Estimated Creatinine Clearance: 24 mL/min (A) (based on SCr of 3.3 mg/dL (H)).     Physical Exam  Vitals and nursing note reviewed.   Constitutional:       General: He is not in acute distress.     Appearance: Normal appearance. He is not ill-appearing, toxic-appearing or diaphoretic.   HENT:      Nose: No congestion.      Mouth/Throat:      Pharynx: No oropharyngeal exudate.   Eyes:      General: No scleral icterus.     Conjunctiva/sclera: Conjunctivae normal.   Cardiovascular:      Rate and Rhythm: Normal rate and regular rhythm.      Heart sounds: No murmur heard.  Pulmonary:      Effort: Pulmonary effort is normal. No respiratory distress.      Breath sounds: No wheezing.      Comments: No appreciable crackles today  O2 nasal cannula  No increased WOB or tachypnea    Musculoskeletal:      Cervical back: Neck supple. No tenderness.      Comments: Right ankle with surgical dressings in place + wound vac   Skin:     General: Skin is warm and dry.   Neurological:      Mental Status: He is alert and oriented to person, place, and time.   Psychiatric:         Mood and Affect: Mood normal.         Behavior: Behavior normal.        Significant Labs: Blood Culture:   Recent Labs   Lab 09/06/24  0235 09/07/24  0910 09/08/24  1641 09/10/24  1711   LABBLOO Gram stain aer bottle: Gram positive cocci in clusters resembling Staph  Gram stain alicia bottle: Gram positive cocci in clusters resembling Staph  Results called to and read back by:Luciana Altamirano RN  09/06/2024  20:52  STAPHYLOCOCCUS AUREUS*  Gram stain aer bottle: Gram positive cocci in clusters resembling Staph  Gram stain alicia bottle: Gram positive cocci in clusters resembling Staph  Results called to and read back by:Luciana Altamirano RN 09/06/2024  20:55  STAPHYLOCOCCUS AUREUS  For susceptibility see order #[id number]  * Gram stain aer bottle: Gram positive cocci in clusters resembling Staph  Results called to and read back by: Roseanne Guevara RN 09/08/2024  14:07  Gram stain alicia bottle: Gram positive cocci in clusters resembling Staph  Positive results previously called 09/08/2024  STAPHYLOCOCCUS AUREUS  ID consult required at [facility].  For susceptibility see order #[id number]  *  Gram stain aer bottle: Gram positive cocci in clusters resembling Staph  Gram stain alicia bottle: Gram positive cocci in clusters resembling Staph  Results called to and read back by:Luciana Altamirano LPN 09/08/2024  03:59  STAPHYLOCOCCUS AUREUS  ID consult required at [facility].  For susceptibility see order #[id number]  * No growth after 5 days.  No growth after 5 days. No Growth to date  No Growth to date  No Growth to date  No Growth to date     CBC:   Recent Labs   Lab 09/12/24  2215 09/13/24  0244 09/14/24 0337   WBC  --  18.63* 18.70*   HGB  --  7.9* 8.3*   HCT 22* 23.6* 24.4*   PLT  --  484* 590*     CMP:   Recent Labs   Lab 09/13/24  0244 09/14/24 0337   * 131*   K 4.0 4.1    105   CO2 16* 17*   GLU 79 154*   BUN 37* 46*   CREATININE 3.0* 3.3*   CALCIUM 7.7* 8.0*   PROT 5.9* 6.0   ALBUMIN 1.2* 1.2*   BILITOT 0.4 0.4   ALKPHOS 494* 413*   AST 80* 49*   ALT 7* 7*   ANIONGAP 10 9     Microbiology Results (last 7 days)       Procedure Component Value Units Date/Time    Urine culture [[id number]] Collected: 09/12/24 1543    Order Status: Completed Specimen: Urine Updated: 09/13/24 2309     Urine Culture, Routine No growth     Narrative:      add on Urine Creatinine and Urine Protein pe Cristal Ash,,   NP/order#[id number] on 09/13/2024 @0025.    Specimen Source->Urine    Blood culture [[id number]] Collected: 09/10/24 1711    Order Status: Completed Specimen: Blood Updated: 09/13/24 2012     Blood Culture, Routine No Growth to date      No Growth to date      No Growth to date      No Growth to date    Blood culture [[id number]] Collected: 09/08/24 1641    Order Status: Completed Specimen: Blood Updated: 09/13/24 2012     Blood Culture, Routine No growth after 5 days.    Narrative:      [id number]    Blood culture [[id number]] Collected: 09/08/24 1641    Order Status: Completed Specimen: Blood Updated: 09/13/24 2012     Blood Culture, Routine No growth after 5 days.    Narrative:      [id number]    Culture, Anaerobe [[id number]] Collected: 09/06/24 1446    Order Status: Completed Specimen: Incision site from Ankle, Right Updated: 09/12/24 1315     Anaerobic Culture No anaerobes isolated    Narrative:      Right ankle wound - culture    Blood culture x two cultures. Draw prior to antibiotics. [[id number]]  (Abnormal)  (Susceptibility) Collected: 09/06/24 0235    Order Status: Completed Specimen: Blood from Peripheral, Antecubital, Left Updated: 09/11/24 1203     Blood Culture, Routine Gram stain aer bottle: Gram positive cocci in clusters resembling Staph      Gram stain alicia bottle: Gram positive cocci in clusters resembling Staph      Results called to and read back by:Luciana Altamirano RN 09/06/2024      20:52      STAPHYLOCOCCUS AUREUS    Narrative:      Aerobic and anaerobic    Blood culture [[id number]]  (Abnormal) Collected: 09/07/24 0910    Order Status: Completed Specimen: Blood Updated: 09/10/24 1000     Blood Culture, Routine Gram stain aer bottle: Gram positive cocci in clusters resembling Staph      Results called to and read back by: Roseanne Guevara RN 09/08/2024  14:07      Gram stain alicia bottle: Gram positive cocci in clusters  resembling Staph      Positive results previously called 09/08/2024      STAPHYLOCOCCUS AUREUS  ID consult required at [facility] and [facility] locations.  For susceptibility see order #[id number]      Blood culture [[id number]]  (Abnormal) Collected: 09/07/24 0910    Order Status: Completed Specimen: Blood Updated: 09/10/24 0959     Blood Culture, Routine Gram stain aer bottle: Gram positive cocci in clusters resembling Staph      Gram stain alicia bottle: Gram positive cocci in clusters resembling Staph      Results called to and read back by:Luciana Altamirano LPN 09/08/2024      03:59      STAPHYLOCOCCUS AUREUS  ID consult required at [facility] and [facility].  For susceptibility see order #[id number]      AFB Culture & Smear [[id number]] Collected: 09/06/24 1446    Order Status: Completed Specimen: Incision site from Ankle, Right Updated: 09/09/24 1545     AFB Culture & Smear Culture in progress     AFB CULTURE STAIN No acid fast bacilli seen.    Narrative:      Right ankle wound - culture    Aerobic culture [[id number]]  (Abnormal)  (Susceptibility) Collected: 09/06/24 1446    Order Status: Completed Specimen: Incision site from Ankle, Right Updated: 09/09/24 1431     Aerobic Bacterial Culture STAPHYLOCOCCUS AUREUS  Many        STREPTOCOCCUS AGALACTIAE (GROUP B)  Many  Beta-hemolytic streptococci are routinely susceptible to   penicillins,cephalosporins and carbapenems.      Narrative:      Right ankle wound - culture    Fungus culture [[id number]] Collected: 09/06/24 1446    Order Status: Completed Specimen: Incision site from Ankle, Right Updated: 09/09/24 1111     Fungus (Mycology) Culture Culture in progress    Narrative:      Right ankle wound - culture    Blood culture x two cultures. Draw prior to antibiotics. [[id number]]  (Abnormal) Collected: 09/06/24 0235    Order Status: Completed Specimen: Blood from Peripheral, Antecubital, Left Updated: 09/08/24 0713     Blood Culture,  Routine Gram stain aer bottle: Gram positive cocci in clusters resembling Staph      Gram stain alicia bottle: Gram positive cocci in clusters resembling Staph      Results called to and read back by:Luciana Altamirano RN 09/06/2024      20:55      STAPHYLOCOCCUS AUREUS  For susceptibility see order #[id number]      Narrative:      Aerobic and anaerobic          Wound Culture:   Recent Labs   Lab 09/06/24  1446   LABAERO STAPHYLOCOCCUS AUREUS  Many  *  STREPTOCOCCUS AGALACTIAE (GROUP B)  Many  Beta-hemolytic streptococci are routinely susceptible to   penicillins,cephalosporins and carbapenems.  *       Significant Imaging: I have reviewed all pertinent imaging results/findings within the past 24 hours.

## 2024-09-15 NOTE — CARE UPDATE
-Glucose Goal 140-180    -A1C:   Hemoglobin A1C   Date Value Ref Range Status   09/06/2024 8.5 (H) 4.0 - 5.6 % Final     Comment:     ADA Screening Guidelines:  5.7-6.4%  Consistent with prediabetes  >or=6.5%  Consistent with diabetes    High levels of fetal hemoglobin interfere with the HbA1C  assay. Heterozygous hemoglobin variants (HbS, HgC, etc)do  not significantly interfere with this assay.   However, presence of multiple variants may affect accuracy.           -HOME REGIMEN: Humalog via OmniPod insulin pump  Mounjaro 10 mg weekly       -GLUCOSE TREND FOR THE PAST 24HRS:   Recent Labs   Lab 09/13/24  2100 09/14/24  0720 09/14/24  1058 09/14/24  1616 09/14/24  2124 09/15/24  0723   POCTGLUCOSE 198* 153* 137* 129* 206* 152*         -NO HYPOGYCEMIAS NOTED     - Diet  Diet Clear liquid (no sugar)/Bariatric    -TOLERATING 50 % OF PO DIET       Plan:     - Continue with Lantus (Insulin Glargine)- 18 units nightly   - [facility] SSI (150/25)  - BG checks q4hr while on SF clears   - No scheduled novolog with meals until diet further advanced   - Hypoglycemia protocol in place     ** Please notify Endocrine for any change and/or advance in diet**  ** Please call Endocrine for any BG related issues **     Discharge Planning:   TBD. Please notify endocrinology prior to discharge.

## 2024-09-15 NOTE — ASSESSMENT & PLAN NOTE
Cortes Schroeder is a 64 y.o. male with PMH of DM, HTN  and right trimalleolar ankle fracture status post ex fix on 07/18 with subsequent definitive fixation on 07/26 admitted with right ankle wound dehiscence in the setting of uncontrolled diabetes.      He is s/p I&D of R ankle 09/06. Repeat I&D R medial ankle 09/09. Pending angiography with vascular once Cr improves.     Labs: DEEPALI improving, 2.7 today from 3.3   Diet: clear liquid diet   Pain control: multimodal regimen  PT/OT:  NWB RLE   DVT PPx: Lovenox 30 renally dosed   Abx:  oxacillin continuous; ID following   Cultures:  ankle cx staph +    Dispo: appreciate plastics and vascular recs, pending angiogram once Cr improves

## 2024-09-15 NOTE — SUBJECTIVE & OBJECTIVE
Principal Problem:Surgical wound breakdown    Principal Orthopedic Problem: s/p I&D and wound vac placement on 09/06    Interval History: AF, HDS overnight. NAEON. Pain moderately well controlled, medications helping. Tolerating PO. No other concerns this am. Pending angio once Cr normalizes to his baseline; 2.7 from 3.3 yesterday.       Review of patient's allergies indicates:   Allergen Reactions    Invokana [canagliflozin] Anaphylaxis    Percocet [oxycodone-acetaminophen] Nausea Only and Hallucinations    Biaxin [clarithromycin]     Hydrocodone Other (See Comments)     Dizzy/nausea/hallucinations    Sulfa (sulfonamide antibiotics) Nausea Only and Rash       Current Facility-Administered Medications   Medication    acetaminophen tablet 500 mg    albuterol-ipratropium 2.5 mg-0.5 mg/3 mL nebulizer solution 3 mL    [START ON 9/16/2024] amLODIPine tablet 10 mg    calcium carbonate 200 mg calcium (500 mg) chewable tablet 1,000 mg    dextrose 10% bolus 125 mL 125 mL    dextrose 10% bolus 125 mL 125 mL    dextrose 10% bolus 250 mL 250 mL    dextrose 10% bolus 250 mL 250 mL    enoxaparin injection 30 mg    glucagon (human recombinant) injection 1 mg    glucose chewable tablet 16 g    glucose chewable tablet 24 g    guaiFENesin 100 mg/5 ml syrup 400 mg    hydrALAZINE tablet 25 mg    insulin aspart U-100 pen 0-10 Units    insulin glargine U-100 (Lantus) pen 18 Units    methocarbamoL tablet 500 mg    morphine injection 2 mg    morphine injection 2 mg    morphine injection 4 mg    nortriptyline capsule 50 mg    ondansetron disintegrating tablet 8 mg    oxacillin 12 g in  mL CONTINUOUS INFUSION    pantoprazole EC tablet 40 mg    polyethylene glycol packet 17 g    senna tablet 8.6 mg    sevelamer carbonate tablet 800 mg    sodium bicarbonate tablet 1,300 mg    sucralfate 100 mg/mL suspension 1 g     Objective:     Vital Signs (Most Recent):  Temp: 97.9 °F (36.6 °C) (09/15/24 1125)  Pulse: 88 (09/15/24 1125)  Resp: 16  "(09/15/24 1253)  BP: (!) 174/79 (09/15/24 1125)  SpO2: 98 % (09/15/24 1125) Vital Signs (24h Range):  Temp:  [97.9 °F (36.6 °C)-98.7 °F (37.1 °C)] 97.9 °F (36.6 °C)  Pulse:  [] 88  Resp:  [16-18] 16  SpO2:  [95 %-100 %] 98 %  BP: (141-185)/(67-81) 174/79     Weight: 93 kg (205 lb 0.4 oz)  Height: 5' 5" (165.1 cm)  Body mass index is 34.12 kg/m².      Intake/Output Summary (Last 24 hours) at 9/15/2024 1321  Last data filed at 9/15/2024 0353  Gross per 24 hour   Intake --   Output 900 ml   Net -900 ml        Ortho/SPM Exam     AAOx4  NAD  Tachycardic  No increased WOB    RLE  Splint C/D/I   Wound vac to good suction  Compartments soft/compressible  [place]   Active toe dorsiflexion & plantarflexion  WWP. Brisk cap refill      Significant Labs:   Recent Labs   Lab 09/15/24  0241   WBC 15.08*   RBC 3.01*   HGB 8.6*   HCT 25.2*   *   MCV 84   MCH 28.6   MCHC 34.1         Significant Imaging: I have reviewed and interpreted all pertinent imaging results/findings.  CTA RLE: Multifocal high-grade stenoses throughout the right calf arteries. No arterial occlusion.    CTA chest: No large central PE identified  "

## 2024-09-15 NOTE — ASSESSMENT & PLAN NOTE
64 year old with poorly controlled DM (A1C 8.5), recent fall resulting in left wrist and right ankle fractures 07/18/24 s/p ex fix Rt ankle and ORIF left wrist 07/19, with subsequent ORIF right ankle on 7/26/24 with Dr. Liz -- now c/b MSSA deep surgical site infection of right ankle involving underlying hardware with associated MSSA bacteremia.        S/p I&D on 09/06 and 9/9.  Op report noting purulence overlying hardware.  No plans for hardware removal at this time.  Unable to achieve primary closure and Plastic Surgery consulted.   A CTA of  lower extremity showed multifocal high grade stenosis throughout right calf, though no occlusion and Vascular Surgery consulted. Right TBI .41. Attempted angiogram 9/12, but developed extreme confusion/AMS and rapid respiratory rate  with versed/fentanyl and procedure postponed.   Further plans for angiogram still deferred for given DEEPALI (Nephrology following).   Plastics recommendations are pending Vascular workup.      Surgical cxs +GBS, MSSA.  Blood cultures 9/6 and 9/7 + for MSSA.  Repeat blood cultures 9/8, 9/11 NGTD.  2D echo negative.   Remains on IV oxacillin.        Afebrile.  WBC now downtrending - 15.  Creatinine appears to have plateaued and is now downtrending.  Liver enzymes normalized.    Recommendations / Plan:  Continue IV oxacillin 12 g q 24 hours continuous infusion.   Monitor creatinine clearance.  No dosage adjustment currently recommended, though neurotoxicity has been reported for cr cl <10.   Anticipate 6 weeks of IV antibiotics from date of most recent washout.    Will consider adding adjunctive rifampin for biofilm penetration if hardware is to remain -  though may not be able to use concurrently with truvada (decreases effectiveness of truvada).  Will discuss further with ID PharmD and Staff  Follow up Vascular/Plastics/Orthpedic plans  So long as hardware remains, anticipate long term oral antibiotic suppression following course of IV abx    Will follow    Data reviewed and plan discussed with ID staff  Secure chat/Discussed above plan with Primary Team

## 2024-09-15 NOTE — ASSESSMENT & PLAN NOTE
Coughing on exam witnesseed 9/13 with water and some secretions in airway on CT, speech eval placed and rec continue liquids at this time  Protnoix started as some reflux may be contributing, and doxy stopped 9/14 in case contributing also. Still some scratchinses, using spray to throat on 9/15, speech to reeval 9/16 on monday

## 2024-09-15 NOTE — SUBJECTIVE & OBJECTIVE
Interval History:  No acute events overnight. Remains afebrile.   WBC downtrending to 15 today  Complains of sore throat/scratchy throat.  Thinks cough triggered by swallowing certain pills/food. Remains on clear liquids. SLP to re-evaluate next week. Denies SOB  Down to 2 L nasal cannula     Review of Systems   Respiratory:  Positive for cough.    Gastrointestinal:  Positive for nausea.   Musculoskeletal:  Positive for arthralgias and myalgias.   Skin:  Positive for color change and wound.   All other systems reviewed and are negative.    Objective:     Vital Signs (Most Recent):  Temp: 97.9 °F (36.6 °C) (09/15/24 1125)  Pulse: 88 (09/15/24 1125)  Resp: 17 (09/15/24 1125)  BP: (!) 174/79 (09/15/24 1125)  SpO2: 98 % (09/15/24 1125) Vital Signs (24h Range):  Temp:  [97.9 °F (36.6 °C)-98.7 °F (37.1 °C)] 97.9 °F (36.6 °C)  Pulse:  [] 88  Resp:  [16-18] 17  SpO2:  [95 %-100 %] 98 %  BP: (141-185)/(67-81) 174/79     Weight: 93 kg (205 lb 0.4 oz)  Body mass index is 34.12 kg/m².    Estimated Creatinine Clearance: 29.4 mL/min (A) (based on SCr of 2.7 mg/dL (H)).     Physical Exam  Vitals and nursing note reviewed.   Constitutional:       General: He is not in acute distress.     Appearance: Normal appearance. He is not ill-appearing, toxic-appearing or diaphoretic.   HENT:      Nose: No congestion.      Mouth/Throat:      Pharynx: No oropharyngeal exudate.   Eyes:      General: No scleral icterus.     Conjunctiva/sclera: Conjunctivae normal.   Cardiovascular:      Rate and Rhythm: Normal rate and regular rhythm.      Heart sounds: No murmur heard.  Pulmonary:      Effort: Pulmonary effort is normal. No respiratory distress.      Breath sounds: No wheezing.      Comments: O2 nasal cannula 2 l  No increased WOB or tachypnea    Musculoskeletal:      Cervical back: Neck supple. No tenderness.      Comments: Right ankle with surgical dressings in place + wound vac    Left wrist - no signs of infection   Skin:     General:  Skin is warm and dry.   Neurological:      Mental Status: He is alert and oriented to person, place, and time.   Psychiatric:         Mood and Affect: Mood normal.         Behavior: Behavior normal.          Significant Labs: Blood Culture:   Recent Labs   Lab 09/06/24  0235 09/07/24  0910 09/08/24  1641 09/10/24  1711   LABBLOO Gram stain aer bottle: Gram positive cocci in clusters resembling Staph  Gram stain alicia bottle: Gram positive cocci in clusters resembling Staph  Results called to and read back by:Luciana Altamirano RN 09/06/2024  20:52  STAPHYLOCOCCUS AUREUS*  Gram stain aer bottle: Gram positive cocci in clusters resembling Staph  Gram stain alicia bottle: Gram positive cocci in clusters resembling Staph  Results called to and read back by:Luciana Altamirano RN 09/06/2024  20:55  STAPHYLOCOCCUS AUREUS  For susceptibility see order #[id number]  * Gram stain aer bottle: Gram positive cocci in clusters resembling Staph  Results called to and read back by: Roseanne Guevara RN 09/08/2024  14:07  Gram stain alicia bottle: Gram positive cocci in clusters resembling Staph  Positive results previously called 09/08/2024  STAPHYLOCOCCUS AUREUS  ID consult required at [facility].  For susceptibility see order #[id number]  *  Gram stain aer bottle: Gram positive cocci in clusters resembling Staph  Gram stain alicia bottle: Gram positive cocci in clusters resembling Staph  Results called to and read back by:Luciana Altamirano LPN 09/08/2024  03:59  STAPHYLOCOCCUS AUREUS  ID consult required at [facility].  For susceptibility see order #[id number]  * No growth after 5 days.  No growth after 5 days. No Growth to date  No Growth to date  No Growth to date  No Growth to date  No Growth to date     CBC:   Recent Labs   Lab 09/14/24  0337 09/15/24  0241   WBC 18.70* 15.08*   HGB 8.3* 8.6*   HCT 24.4* 25.2*   * 651*     CMP:   Recent Labs   Lab  09/14/24  0337 09/15/24  0241   * 130*   K 4.1 4.4    104   CO2 17* 16*   * 154*   BUN 46* 43*   CREATININE 3.3* 2.7*   CALCIUM 8.0* 7.9*   PROT 6.0 6.2   ALBUMIN 1.2* 1.3*   BILITOT 0.4 0.4   ALKPHOS 413* 361*   AST 49* 39   ALT 7* 7*   ANIONGAP 9 10     Microbiology Results (last 7 days)       Procedure Component Value Units Date/Time    Blood culture [[id number]] Collected: 09/10/24 1711    Order Status: Completed Specimen: Blood Updated: 09/14/24 2012     Blood Culture, Routine No Growth to date      No Growth to date      No Growth to date      No Growth to date      No Growth to date    Urine culture [[id number]] Collected: 09/12/24 1543    Order Status: Completed Specimen: Urine Updated: 09/13/24 2309     Urine Culture, Routine No growth    Narrative:      add on Urine Creatinine and Urine Protein pe Hernandez   NP/order#[id number] on 09/13/2024 @0025.    Specimen Source->Urine    Blood culture [[id number]] Collected: 09/08/24 1641    Order Status: Completed Specimen: Blood Updated: 09/13/24 2012     Blood Culture, Routine No growth after 5 days.    Narrative:      [id number]    Blood culture [[id number]] Collected: 09/08/24 1641    Order Status: Completed Specimen: Blood Updated: 09/13/24 2012     Blood Culture, Routine No growth after 5 days.    Narrative:      [id number]    Culture, Anaerobe [[id number]] Collected: 09/06/24 1446    Order Status: Completed Specimen: Incision site from Ankle, Right Updated: 09/12/24 1315     Anaerobic Culture No anaerobes isolated    Narrative:      Right ankle wound - culture    Blood culture x two cultures. Draw prior to antibiotics. [[id number]]  (Abnormal)  (Susceptibility) Collected: 09/06/24 0235    Order Status: Completed Specimen: Blood from Peripheral, Antecubital, Left Updated: 09/11/24 1203     Blood Culture, Routine Gram stain aer bottle: Gram positive cocci in clusters resembling Staph      Gram stain alicia bottle: Gram positive cocci in clusters  resembling Staph      Results called to and read back by:Luciana Altamirano RN 09/06/2024      20:52      STAPHYLOCOCCUS AUREUS    Narrative:      Aerobic and anaerobic    Blood culture [[id number]]  (Abnormal) Collected: 09/07/24 0910    Order Status: Completed Specimen: Blood Updated: 09/10/24 1000     Blood Culture, Routine Gram stain aer bottle: Gram positive cocci in clusters resembling Staph      Results called to and read back by: Roseanne Guevara RN 09/08/2024  14:07      Gram stain alicia bottle: Gram positive cocci in clusters resembling Staph      Positive results previously called 09/08/2024      STAPHYLOCOCCUS AUREUS  ID consult required at [facility] and [facility] locations.  For susceptibility see order #[id number]      Blood culture [[id number]]  (Abnormal) Collected: 09/07/24 0910    Order Status: Completed Specimen: Blood Updated: 09/10/24 0959     Blood Culture, Routine Gram stain aer bottle: Gram positive cocci in clusters resembling Staph      Gram stain alicia bottle: Gram positive cocci in clusters resembling Staph      Results called to and read back by:Luciana Altamirano LPN 09/08/2024      03:59      STAPHYLOCOCCUS AUREUS  ID consult required at [facility] and [facility] locations.  For susceptibility see order #[id number]      AFB Culture & Smear [[id number]] Collected: 09/06/24 1446    Order Status: Completed Specimen: Incision site from Ankle, Right Updated: 09/09/24 1545     AFB Culture & Smear Culture in progress     AFB CULTURE STAIN No acid fast bacilli seen.    Narrative:      Right ankle wound - culture    Aerobic culture [[id number]]  (Abnormal)  (Susceptibility) Collected: 09/06/24 1446    Order Status: Completed Specimen: Incision site from Ankle, Right Updated: 09/09/24 1431     Aerobic Bacterial Culture STAPHYLOCOCCUS AUREUS  Many        STREPTOCOCCUS AGALACTIAE (GROUP B)  Many  Beta-hemolytic streptococci are routinely susceptible to   penicillins,cephalosporins and  carbapenems.      Narrative:      Right ankle wound - culture    Fungus culture [[id number]] Collected: 09/06/24 1446    Order Status: Completed Specimen: Incision site from Ankle, Right Updated: 09/09/24 1111     Fungus (Mycology) Culture Culture in progress    Narrative:      Right ankle wound - culture          Wound Culture:   Recent Labs   Lab 09/06/24  1446   LABAERO STAPHYLOCOCCUS AUREUS  Many  *  STREPTOCOCCUS AGALACTIAE (GROUP B)  Many  Beta-hemolytic streptococci are routinely susceptible to   penicillins,cephalosporins and carbapenems.  *       Significant Imaging: I have reviewed all pertinent imaging results/findings within the past 24 hours.

## 2024-09-15 NOTE — ASSESSMENT & PLAN NOTE
64 year old with poorly controlled DM (A1C 8.5), recent fall resulting in left wrist fx and right ankle fracture 07/18/24 s/p ex fix Rt ankle and ORIF left wrist 07/19, and later subsequent ORIF right ankle on 7/26/24 with Dr. Liz -- now c/b deep surgical site infection of right ankle involving underlying hardware with associated bacteremia.   No SOI in left wrist.      S/p I&D on 09/06 and 9/9.  Op report noting purulence overlying hardware.  No plans for hardware removal at this time.  Unable to achieve primary closure and Plastic Surgery consulted.   A CTA of  lower extremity showed multifocal high grade stenosis throughout right calf, though no occlusion and Vascular Surgery consulted. Right TBI .41. Attempted angiogram 9/12, but developed extreme confusion/AMS with versed, rapid respiratory rate with versed/fentanyl and procedure postponed.   Further plans for angiogram still deferred for now given DEEPALI (Nephrology following)  Plastics recommendations are pending Vascular workup.      Surgical cxs +GBS, MSSA.  Blood cultures 9/6 and 9/7 + for MSSA.  Repeat blood cultures 9/8, 9/11 NGTD.  2D echo negative.   Remains on IV oxacillin for MSSA.  Afebrile.  WBC still elevated - 18.  Liver enzymes improved.    Recommendations / Plan:  Continue IV oxacillin 12 g q 24 hours continuous infusion.   Monitor creatinine clearance.  No dosage adjustment currently recommended, though neurotoxicity has been reported for cr cl <10.   Anticipate 6 weeks of IV antibiotics from date of most recent washout.    Will consider adding adjunctive rifampin for biofilm penetration if hardware is to remain -  though may not be able to use concurrently with truvada (decreases effectiveness of truvada).    Follow WBC  Follow up Vascular/Plastics/Orthpedic plans  So long as hardware remains, anticipate long term oral antibiotic suppression following course of IV abx   Will follow    Data reviewed and plan discussed with ID staff  Secure  chat/Discussed above plan with Primary Team

## 2024-09-15 NOTE — ASSESSMENT & PLAN NOTE
Persistent non-productive cough, though some sputum production today.  CTA negative for PE.  CT chest yesterday showed no consolidation in lungs or evidence of septic emboli.  It was significant for tracheobronchial malacia and hypoaeration of lungs.  Also noted material throughout esophagus - reflux vs delayed motility.  Patient does c/o persistent globus sensation. SLP following.  Currently on clear liquids.   O2 sats % on 2 liters nasal cannula.  Was on doxycycline, which was discontinued yesterday    Plan:  Aspiration precautions  Consider GI consult for possible delayed esophageal motility/dysphagia?

## 2024-09-15 NOTE — PROGRESS NOTES
[facility]  Orthopedics  Progress Note    Patient Name: Cortes Schroeder  MRN: [id number]  Admission Date: 9/6/2024  Hospital Length of Stay: 9 days  Attending Provider: Maria Del Rosario Medina MD  Primary Care Provider: Andrew Sinclair Jr., MD  Follow-up For: Procedure(s) (LRB):  Angiogram, Lower Arterial, Unilateral (Right)    Post-Operative Day: 3 Days Post-Op  Subjective:     Principal Problem:Surgical wound breakdown    Principal Orthopedic Problem: s/p I&D and wound vac placement on 09/06    Interval History: AF, HDS overnight. NAEON. Pain moderately well controlled, medications helping. Tolerating PO. No other concerns this am. Pending angio once Cr normalizes to his baseline; 2.7 from 3.3 yesterday.       Review of patient's allergies indicates:   Allergen Reactions    Invokana [canagliflozin] Anaphylaxis    Percocet [oxycodone-acetaminophen] Nausea Only and Hallucinations    Biaxin [clarithromycin]     Hydrocodone Other (See Comments)     Dizzy/nausea/hallucinations    Sulfa (sulfonamide antibiotics) Nausea Only and Rash       Current Facility-Administered Medications   Medication    acetaminophen tablet 500 mg    albuterol-ipratropium 2.5 mg-0.5 mg/3 mL nebulizer solution 3 mL    [START ON 9/16/2024] amLODIPine tablet 10 mg    calcium carbonate 200 mg calcium (500 mg) chewable tablet 1,000 mg    dextrose 10% bolus 125 mL 125 mL    dextrose 10% bolus 125 mL 125 mL    dextrose 10% bolus 250 mL 250 mL    dextrose 10% bolus 250 mL 250 mL    enoxaparin injection 30 mg    glucagon (human recombinant) injection 1 mg    glucose chewable tablet 16 g    glucose chewable tablet 24 g    guaiFENesin 100 mg/5 ml syrup 400 mg    hydrALAZINE tablet 25 mg    insulin aspart U-100 pen 0-10 Units    insulin glargine U-100 (Lantus) pen 18 Units    methocarbamoL tablet 500 mg    morphine injection 2 mg    morphine injection 2 mg    morphine injection 4 mg    nortriptyline capsule 50 mg    ondansetron disintegrating  "tablet 8 mg    oxacillin 12 g in  mL CONTINUOUS INFUSION    pantoprazole EC tablet 40 mg    polyethylene glycol packet 17 g    senna tablet 8.6 mg    sevelamer carbonate tablet 800 mg    sodium bicarbonate tablet 1,300 mg    sucralfate 100 mg/mL suspension 1 g     Objective:     Vital Signs (Most Recent):  Temp: 97.9 °F (36.6 °C) (09/15/24 1125)  Pulse: 88 (09/15/24 1125)  Resp: 16 (09/15/24 1253)  BP: (!) 174/79 (09/15/24 1125)  SpO2: 98 % (09/15/24 1125) Vital Signs (24h Range):  Temp:  [97.9 °F (36.6 °C)-98.7 °F (37.1 °C)] 97.9 °F (36.6 °C)  Pulse:  [] 88  Resp:  [16-18] 16  SpO2:  [95 %-100 %] 98 %  BP: (141-185)/(67-81) 174/79     Weight: 93 kg (205 lb 0.4 oz)  Height: 5' 5" (165.1 cm)  Body mass index is 34.12 kg/m².      Intake/Output Summary (Last 24 hours) at 9/15/2024 1321  Last data filed at 9/15/2024 0353  Gross per 24 hour   Intake --   Output 900 ml   Net -900 ml        Ortho/SPM Exam     AAOx4  NAD  Tachycardic  No increased WOB    RLE  Splint C/D/I   Wound vac to good suction  Compartments soft/compressible  [place]   Active toe dorsiflexion & plantarflexion  WWP. Brisk cap refill      Significant Labs:   Recent Labs   Lab 09/15/24  0241   WBC 15.08*   RBC 3.01*   HGB 8.6*   HCT 25.2*   *   MCV 84   MCH 28.6   MCHC 34.1         Significant Imaging: I have reviewed and interpreted all pertinent imaging results/findings.  CTA RLE: Multifocal high-grade stenoses throughout the right calf arteries. No arterial occlusion.    CTA chest: No large central PE identified  Assessment/Plan:     * Surgical wound breakdown  Cortes Schroeder is a 64 y.o. male with PMH of DM, HTN  and right trimalleolar ankle fracture status post ex fix on 07/18 with subsequent definitive fixation on 07/26 admitted with right ankle wound dehiscence in the setting of uncontrolled diabetes.      He is s/p I&D of R ankle 09/06. Repeat I&D R medial ankle 09/09. Pending angiography with vascular once Cr improves. "     Labs: DEEPALI improving, 2.7 today from 3.3   Diet: clear liquid diet   Pain control: multimodal regimen  PT/OT:  NWB RLE   DVT PPx: Lovenox 30 renally dosed   Abx:  oxacillin continuous; ID following   Cultures:  ankle cx staph +    Dispo: appreciate plastics and vascular recs, pending angiogram once Cr improves              MARYSE Barahona MD  Orthopedics  [facility] - Surgery

## 2024-09-15 NOTE — NURSING
Nurses Note -- 4 Eyes      9/14/2024   7:34 PM      Skin assessed during: Q Shift Change      [x] No Altered Skin Integrity Present    []Prevention Measures Documented      [] Yes- Altered Skin Integrity Present or Discovered   [] LDA Added if Not in Epic (Describe Wound)   [] New Altered Skin Integrity was Present on Admit and Documented in LDA   [] Wound Image Taken    Wound Care Consulted? No    Attending Nurse: Shabnam PRITCHARD.    Second RN/Staff Member:  Wade PRITCHARD.

## 2024-09-15 NOTE — ASSESSMENT & PLAN NOTE
This patient does have evidence of infective focus  My overall impression is sepsis.  Source: Skin and Soft Tissue (location ankle)  Antibiotics given-   Antibiotics (72h ago, onward)      Start     Stop Route Frequency Ordered    09/09/24 1730  oxacillin 12 g in  mL CONTINUOUS INFUSION         -- IV Every 24 hours (non-standard times) 09/09/24 1630          Latest lactate reviewed-  Recent Labs   Lab 09/12/24  1618 09/12/24  2216   LACTATE 0.6  --    POCLAC  --  <0.30*       Organ dysfunction indicated by Acute kidney injury and Encephalopathy    Fluid challenge Actual Body weight- Patient will receive 30ml/kg actual body weight to calculate fluid bolus for treatment of septic shock.     Post- resuscitation assessment No - Post resuscitation assessment not needed     Will Not start Pressors-   Source control achieved by: see above  -secondary to surgical wound infeection in right ankle, with 9/6 wound Cx with group B strep and staph with bacteremia with 9/7 and 9/6  blood with staph with 9/8 and 9/10 blood Cx NGTD  ID following. Echo without signs of vegtations. On oxacillin now. Wbc still higher at 22, and ID following, procal 2 on 9/12-->1 on 9/13. CRp improved to 235. Wbc starting to improve at 18 now. .doxy stopped given no PNA signs on CT. Reached out to ortho on 9/14 regarding ankle as no obvious other source of elevated wbc per discussion with ID to see if any plans to reassess wound under vac, last op note had necrosis and no obvious purulence. Wbc improving 9/15 at 15 now

## 2024-09-15 NOTE — SUBJECTIVE & OBJECTIVE
Interval History: he reports feeling okay this morning, no new issuse reported, 2 BM overnight, and 1.3 L output and picking up. Cr improving at 2.7 and hit plateau at 3.3 it appears. On 2L oxygen from 3L and he denies any worsening dyspnea nor sputum. Still having scratchy throat and soreness with swallowing and speech to likely return tomorrow to reeval if can upgrade and how swallow is going and doxy stopped yesterday as may have been a contributor and will follow trends. Bp has been consistently higher end and on arb before admit so will do norvasc 5 given DEEPALI and cannot do that med at this time. Wbc improving to 15. Ast normalized at 39 now holding statin and home truvada while inpatient. Will plan for vioding trial without delvalle likely later this week once a few days of improving Cr. Discussed that angiogram would not be until Cr has fully stabilized so may be considered later in week and will have to check in with teams later in week and see how things are going to discuss further plans. He is happy to hear that labs are improving today. Pain doing okay as well so far. Endo followign and adjusting insulin and ID following as still TBD final antibiotic plans, as rifampin may interact with truvada so determining if long term that may be option as well as plans regarding hardware removal are still TBD pending angiogram/viability of a potential flap still TBD as well for long term.        Review of Systems   Constitutional:  Positive for activity change and fatigue. Negative for chills and fever.   Respiratory:  Positive for cough. Negative for chest tightness and shortness of breath.    Cardiovascular:  Negative for chest pain and leg swelling.   Gastrointestinal:  Positive for nausea. Negative for abdominal pain.   Musculoskeletal:  Positive for arthralgias.   Skin:  Positive for color change and wound.   Neurological:  Negative for dizziness and weakness.     Objective:     Vital Signs (Most Recent):  Temp:  97.9 °F (36.6 °C) (09/15/24 1125)  Pulse: 88 (09/15/24 1125)  Resp: 17 (09/15/24 1125)  BP: (!) 174/79 (09/15/24 1125)  SpO2: 98 % (09/15/24 1125) Vital Signs (24h Range):  Temp:  [97.9 °F (36.6 °C)-98.7 °F (37.1 °C)] 97.9 °F (36.6 °C)  Pulse:  [] 88  Resp:  [16-18] 17  SpO2:  [95 %-100 %] 98 %  BP: (141-185)/(67-81) 174/79     Weight: 93 kg (205 lb 0.4 oz)  Body mass index is 34.12 kg/m².    Intake/Output Summary (Last 24 hours) at 9/15/2024 1131  Last data filed at 9/15/2024 0353  Gross per 24 hour   Intake --   Output 1725 ml   Net -1725 ml         Physical Exam  Vitals and nursing note reviewed.   Constitutional:       Appearance: He is well-developed. He is obese. He is ill-appearing.      Comments: At baseline mental status without return of confusion. On oxygen   Eyes:      Pupils: Pupils are equal, round, and reactive to light.   Cardiovascular:      Rate and Rhythm: Regular rhythm. Tachycardia present.      Comments: HR 90s  Pulmonary:      Effort: Pulmonary effort is normal.      Breath sounds: Rales (bibasilar) present.      Comments: On NC. Speaking in full sentenecs without stopping for dyspnea. No crackles. No increased RR on exam. Breathing comfortably  Abdominal:      Palpations: Abdomen is soft.      Tenderness: There is no abdominal tenderness.   Musculoskeletal:         General: No tenderness.      Comments: Bandages in place. Wound vac with dark red output.   Skin:     General: Skin is warm and dry.      Comments: C/d/I dressing to R ankle with wound vac.    Neurological:      Mental Status: He is alert and oriented to person, place, and time.      Comments: Close to mental status baseline, much improved   Psychiatric:         Behavior: Behavior normal.             Significant Labs: All pertinent labs within the past 24 hours have been reviewed.  CBC:   Recent Labs   Lab 09/14/24  0337 09/15/24  0241   WBC 18.70* 15.08*   HGB 8.3* 8.6*   HCT 24.4* 25.2*   * 651*     CMP:   Recent  Labs   Lab 09/14/24  0337 09/15/24  0241   * 130*   K 4.1 4.4    104   CO2 17* 16*   * 154*   BUN 46* 43*   CREATININE 3.3* 2.7*   CALCIUM 8.0* 7.9*   PROT 6.0 6.2   ALBUMIN 1.2* 1.3*   BILITOT 0.4 0.4   ALKPHOS 413* 361*   AST 49* 39   ALT 7* 7*   ANIONGAP 9 10       Significant Imaging: I have reviewed all pertinent imaging results/findings within the past 24 hours.

## 2024-09-15 NOTE — PROGRESS NOTES
[facility] - Surgery  Infectious Disease  Progress Note    Patient Name: Cortes Schroeder  MRN: [id number]  Admission Date: 9/6/2024  Length of Stay: 9 days  Attending Physician: Maria Del Rosario Medina MD  Primary Care Provider: Andrew Sinclair Jr., MD    Isolation Status: No active isolations  Assessment/Plan:         ID  Surgical site infection    64 year old with poorly controlled DM (A1C 8.5), recent fall resulting in left wrist and right ankle fractures 07/18/24 s/p ex fix Rt ankle and ORIF left wrist 07/19, with subsequent ORIF right ankle on 7/26/24 with Dr. Liz -- now c/b MSSA deep surgical site infection of right ankle involving underlying hardware with associated MSSA bacteremia.        S/p I&D on 09/06 and 9/9.  Op report noting purulence overlying hardware.  No plans for hardware removal at this time.  Unable to achieve primary closure and Plastic Surgery consulted.   A CTA of  lower extremity showed multifocal high grade stenosis throughout right calf, though no occlusion and Vascular Surgery consulted. Right TBI .41. Attempted angiogram 9/12, but developed extreme confusion/AMS and rapid respiratory rate  with versed/fentanyl and procedure postponed.   Further plans for angiogram still deferred for given DEEPALI (Nephrology following).   Plastics recommendations are pending Vascular workup.      Surgical cxs +GBS, MSSA.  Blood cultures 9/6 and 9/7 + for MSSA.  Repeat blood cultures 9/8, 9/11 NGTD.  2D echo negative.   Remains on IV oxacillin.        Afebrile.  WBC now downtrending - 15.  Creatinine appears to have plateaued and is now downtrending.  Liver enzymes normalized.    Recommendations / Plan:  Continue IV oxacillin 12 g q 24 hours continuous infusion.   Monitor creatinine clearance.  No dosage adjustment currently recommended, though neurotoxicity has been reported for cr cl <10.   Anticipate 6 weeks of IV antibiotics from date of most recent washout.    Will consider adding adjunctive rifampin  for biofilm penetration if hardware is to remain -  though may not be able to use concurrently with truvada (decreases effectiveness of truvada).  Will discuss further with ID PharmD and Staff  Follow up Vascular/Plastics/Orthpedic plans  So long as hardware remains, anticipate long term oral antibiotic suppression following course of IV abx   Will follow    Data reviewed and plan discussed with ID staff  Secure chat/Discussed above plan with Primary Team      Pulmonary  Cough    Persistent non-productive cough, though some sputum production today.  CTA negative for PE.  CT chest yesterday showed no consolidation in lungs or evidence of septic emboli.  It was significant for tracheobronchial malacia and hypoaeration of lungs.  Also noted material throughout esophagus - reflux vs delayed motility.  Patient does c/o persistent globus sensation. SLP following.  Currently on clear liquids.   O2 sats % on 2 liters nasal cannula.  Was on doxycycline, which was discontinued yesterday    Plan:  Aspiration precautions  Consider GI consult for possible delayed esophageal motility/dysphagia?      Thank you.   Please Secure Chat for any questions or concerns.  ISELA Alexis, ANP-C  Infectious Disease       Subjective:     Principal Problem:Surgical wound breakdown    HPI: Cortes Schroeder is a 64 year old with HTN, poorly controlled DM (A1C 8.5), recent history of syncopal episodes, and  right ankle fracture on 7/18/24 s/p ex fix with subsequent ORIF on 7/26/24 with Dr. Liz.  At outpatient Orthopedic follow up surgical wound was healing and sutures removed.  Since then patient  reportedly doing well until about 1 week ago when he noticed some drainage from his surgical incisions.  Over the past week he developed fevers, chills, and night sweats.  Yesterday he became confused/altered and was brought to ED.  In ED was hypotensive, tachycardic, WBC 18, .  Sepsis protocol initiated and started on clindamycin has  been doing well postoperatively until around 1 week ago when he started noticing some drainage from his surgical incisions. He started to develop fevers, chills and night sweats over the past week. Yesterday afternoon, patient became altered and was brought to the ED by his roommate. Patient currently lives in [place]. Upon presentation to the ED, patient was tachycardic, hypotensive and afebrile. WBC of 18.68, .3. Sepsis protocol initiated. Patient was started on clindamycin and vancomycin.  Now on vancomycin and cefepime.       Orthopedics consulted.  Noted draining wound over the medial side of the right ankle as well as eschar formation over the lateral side of the ankle.  Right ankle and foot noted to be erythematous and edematous.  Pending surgical I&D today.     I have reviewed hospital notes from  HM service and other specialty providers. I have also reviewed CBC, CMP/BMP,  cultures and imaging with my interpretation as documented.      64M with poorly controlled DM (A1C 8.5), recent right ankle fracture 07/18/24 s/p ex fix with subsequent ORIF on 7/26/24 with Dr. Liz -- now c/b deep surgical site infection involving underlying hardware. Pt arrived S/p I&D 09/06. Op report noting purulence overlying hardware, cxs sent, gram stain +GPCs. Index bld cxs 09/06 +Staph aureus (BCID MRSA neg). Repeat bld cxs 09/07 NGTD. Post-op day 1 developed fever, with tachycardia 120, and ongoing AMS. Pt continuing on Iv-vanc and escalated cefepime to zosyn.    Ortho plans to return to OR Monday 09/09 for repeat I&D with Plastic surgery assistance for closure.    Recommendations / Plan:  Continue Iv-vanc and zosyn.   Will f/u repeat bld cxs 09/07 to ensure cleared  Will f/u surgical cxs and adjust abx accordingly  Recommend TTE  Anticipate abx duration of 6wks s/p 09/09, DON 10/21.      -- Discussed with ID staff and primary team   -- ID will continue to follow w/ further recs.          Interval History:  No  acute events overnight. Remains afebrile.   WBC downtrending to 15 today  Complains of sore throat/scratchy throat.  Thinks cough triggered by swallowing certain pills/food. Remains on clear liquids. SLP to re-evaluate next week. Denies SOB  Down to 2 L nasal cannula     Review of Systems   Respiratory:  Positive for cough.    Gastrointestinal:  Positive for nausea.   Musculoskeletal:  Positive for arthralgias and myalgias.   Skin:  Positive for color change and wound.   All other systems reviewed and are negative.    Objective:     Vital Signs (Most Recent):  Temp: 97.9 °F (36.6 °C) (09/15/24 1125)  Pulse: 88 (09/15/24 1125)  Resp: 17 (09/15/24 1125)  BP: (!) 174/79 (09/15/24 1125)  SpO2: 98 % (09/15/24 1125) Vital Signs (24h Range):  Temp:  [97.9 °F (36.6 °C)-98.7 °F (37.1 °C)] 97.9 °F (36.6 °C)  Pulse:  [] 88  Resp:  [16-18] 17  SpO2:  [95 %-100 %] 98 %  BP: (141-185)/(67-81) 174/79     Weight: 93 kg (205 lb 0.4 oz)  Body mass index is 34.12 kg/m².    Estimated Creatinine Clearance: 29.4 mL/min (A) (based on SCr of 2.7 mg/dL (H)).     Physical Exam  Vitals and nursing note reviewed.   Constitutional:       General: He is not in acute distress.     Appearance: Normal appearance. He is not ill-appearing, toxic-appearing or diaphoretic.   HENT:      Nose: No congestion.      Mouth/Throat:      Pharynx: No oropharyngeal exudate.   Eyes:      General: No scleral icterus.     Conjunctiva/sclera: Conjunctivae normal.   Cardiovascular:      Rate and Rhythm: Normal rate and regular rhythm.      Heart sounds: No murmur heard.  Pulmonary:      Effort: Pulmonary effort is normal. No respiratory distress.      Breath sounds: No wheezing.      Comments: O2 nasal cannula 2 l  No increased WOB or tachypnea    Musculoskeletal:      Cervical back: Neck supple. No tenderness.      Comments: Right ankle with surgical dressings in place + wound vac    Left wrist - no signs of infection   Skin:     General: Skin is warm and dry.    Neurological:      Mental Status: He is alert and oriented to person, place, and time.   Psychiatric:         Mood and Affect: Mood normal.         Behavior: Behavior normal.          Significant Labs: Blood Culture:   Recent Labs   Lab 09/06/24  0235 09/07/24  0910 09/08/24  1641 09/10/24  1711   LABBLOO Gram stain aer bottle: Gram positive cocci in clusters resembling Staph  Gram stain alicia bottle: Gram positive cocci in clusters resembling Staph  Results called to and read back by:Luciana Altamirano RN 09/06/2024  20:52  STAPHYLOCOCCUS AUREUS*  Gram stain aer bottle: Gram positive cocci in clusters resembling Staph  Gram stain alicia bottle: Gram positive cocci in clusters resembling Staph  Results called to and read back by:Luciana Altamirano RN 09/06/2024  20:55  STAPHYLOCOCCUS AUREUS  For susceptibility see order #[id number]  * Gram stain aer bottle: Gram positive cocci in clusters resembling Staph  Results called to and read back by: Roseanne Guevara RN 09/08/2024  14:07  Gram stain alicia bottle: Gram positive cocci in clusters resembling Staph  Positive results previously called 09/08/2024  STAPHYLOCOCCUS AUREUS  ID consult required at [facility].  For susceptibility see order #[id number]  *  Gram stain aer bottle: Gram positive cocci in clusters resembling Staph  Gram stain alicia bottle: Gram positive cocci in clusters resembling Staph  Results called to and read back by:Luciana Altamirano LPN 09/08/2024  03:59  STAPHYLOCOCCUS AUREUS  ID consult required at [facility].  For susceptibility see order #[id number]  * No growth after 5 days.  No growth after 5 days. No Growth to date  No Growth to date  No Growth to date  No Growth to date  No Growth to date     CBC:   Recent Labs   Lab 09/14/24  0337 09/15/24  0241   WBC 18.70* 15.08*   HGB 8.3* 8.6*   HCT 24.4* 25.2*   * 651*     CMP:   Recent Labs   Lab 09/14/24 0337  09/15/24  0241   * 130*   K 4.1 4.4    104   CO2 17* 16*   * 154*   BUN 46* 43*   CREATININE 3.3* 2.7*   CALCIUM 8.0* 7.9*   PROT 6.0 6.2   ALBUMIN 1.2* 1.3*   BILITOT 0.4 0.4   ALKPHOS 413* 361*   AST 49* 39   ALT 7* 7*   ANIONGAP 9 10     Microbiology Results (last 7 days)       Procedure Component Value Units Date/Time    Blood culture [[id number]] Collected: 09/10/24 1711    Order Status: Completed Specimen: Blood Updated: 09/14/24 2012     Blood Culture, Routine No Growth to date      No Growth to date      No Growth to date      No Growth to date      No Growth to date    Urine culture [[id number]] Collected: 09/12/24 1543    Order Status: Completed Specimen: Urine Updated: 09/13/24 2309     Urine Culture, Routine No growth    Narrative:      add on Urine Creatinine and Urine Protein pe Hernandez   NP/order#[id number] on 09/13/2024 @0025.    Specimen Source->Urine    Blood culture [[id number]] Collected: 09/08/24 1641    Order Status: Completed Specimen: Blood Updated: 09/13/24 2012     Blood Culture, Routine No growth after 5 days.    Narrative:      [id number]    Blood culture [[id number]] Collected: 09/08/24 1641    Order Status: Completed Specimen: Blood Updated: 09/13/24 2012     Blood Culture, Routine No growth after 5 days.    Narrative:      [id number]    Culture, Anaerobe [[id number]] Collected: 09/06/24 1446    Order Status: Completed Specimen: Incision site from Ankle, Right Updated: 09/12/24 1315     Anaerobic Culture No anaerobes isolated    Narrative:      Right ankle wound - culture    Blood culture x two cultures. Draw prior to antibiotics. [[id number]]  (Abnormal)  (Susceptibility) Collected: 09/06/24 0235    Order Status: Completed Specimen: Blood from Peripheral, Antecubital, Left Updated: 09/11/24 1203     Blood Culture, Routine Gram stain aer bottle: Gram positive cocci in clusters resembling Staph      Gram stain alicia bottle: Gram positive cocci in clusters resembling Staph       Results called to and read back by:Luciana Altamirano RN 09/06/2024      20:52      STAPHYLOCOCCUS AUREUS    Narrative:      Aerobic and anaerobic    Blood culture [[id number]]  (Abnormal) Collected: 09/07/24 0910    Order Status: Completed Specimen: Blood Updated: 09/10/24 1000     Blood Culture, Routine Gram stain aer bottle: Gram positive cocci in clusters resembling Staph      Results called to and read back by: Roseanne Guevara RN 09/08/2024  14:07      Gram stain alicia bottle: Gram positive cocci in clusters resembling Staph      Positive results previously called 09/08/2024      STAPHYLOCOCCUS AUREUS  ID consult required at [facility] and [facility] locations.  For susceptibility see order #[id number]      Blood culture [[id number]]  (Abnormal) Collected: 09/07/24 0910    Order Status: Completed Specimen: Blood Updated: 09/10/24 0959     Blood Culture, Routine Gram stain aer bottle: Gram positive cocci in clusters resembling Staph      Gram stain alicia bottle: Gram positive cocci in clusters resembling Staph      Results called to and read back by:Luciana Altamirano LPN 09/08/2024      03:59      STAPHYLOCOCCUS AUREUS  ID consult required at [facility] and [facility] locations.  For susceptibility see order #[id number]      AFB Culture & Smear [[id number]] Collected: 09/06/24 1446    Order Status: Completed Specimen: Incision site from Ankle, Right Updated: 09/09/24 1545     AFB Culture & Smear Culture in progress     AFB CULTURE STAIN No acid fast bacilli seen.    Narrative:      Right ankle wound - culture    Aerobic culture [[id number]]  (Abnormal)  (Susceptibility) Collected: 09/06/24 1446    Order Status: Completed Specimen: Incision site from Ankle, Right Updated: 09/09/24 1431     Aerobic Bacterial Culture STAPHYLOCOCCUS AUREUS  Many        STREPTOCOCCUS AGALACTIAE (GROUP B)  Many  Beta-hemolytic streptococci are routinely susceptible to   penicillins,cephalosporins and carbapenems.       Narrative:      Right ankle wound - culture    Fungus culture [[id number]] Collected: 09/06/24 1446    Order Status: Completed Specimen: Incision site from Ankle, Right Updated: 09/09/24 1111     Fungus (Mycology) Culture Culture in progress    Narrative:      Right ankle wound - culture          Wound Culture:   Recent Labs   Lab 09/06/24  1446   LABAERO STAPHYLOCOCCUS AUREUS  Many  *  STREPTOCOCCUS AGALACTIAE (GROUP B)  Many  Beta-hemolytic streptococci are routinely susceptible to   penicillins,cephalosporins and carbapenems.  *       Significant Imaging: I have reviewed all pertinent imaging results/findings within the past 24 hours.

## 2024-09-15 NOTE — PROGRESS NOTES
[facility] [place] [facility] Medicine  Progress Note    Patient Name: Cortes Schroeder  MRN: [id number]  Patient Class: IP- Inpatient   Admission Date: 9/6/2024  Length of Stay: 9 days  Attending Physician: Maria Del Rosario Medina MD  Primary Care Provider: Andrew Sinclair Jr., MD        Subjective:     Principal Problem:Surgical wound breakdown        HPI:  Cortes Schroeder is a 64 y.o. M with PMHx of anxiety, T2DM, GERD, HLD, HTN who presented to ED for AMS. He had a fall in July that resulted in R trimalleolar fracture and L distal radius fracture. He had external fixation on 07/18 and then ORIF on 07/26 with Dr. Liz. He was prescribed clinda 300 mg on 08/28 for a ten day course. Patient was doing well when he acutely became altered and not acting like himself a few hours ago. Patient family member drove him here from [facility] for ortho consult. R medial ankle wound dehisced with redness and swelling around it. No CPM fever, cough, N/V/D or seizure.    In ED: T max 99.1. SIRS 2/4 with WBC 18 and . CMP notable for Na 127, Cr 1.7, . Phos 1.9. .3. BNP 73. Trop neg. A1c 8.5. R tib fib XR notes There are 2 abandoned anterior-posteriorly oriented pin tracks in the right mid tibial shaft.  On AP views, each of these pin tracks demonstrates a small faint internal density, possibly representing a sequestrum. Ortho and endocrine consulted. Given IV vanc and IV clindamycin. Given 2.7L IVFs. Admitted to hospital medicine for sepsis 2/2 infected hardware.     Overview/Hospital Course:  Cortes Schroeder is a 65 yo M admitted to hospital medicine for sepsis 2/2 R ankle infection s/p ORIF on 07/26. Started on IV vanc and cefepime. Given IVFs. Brought to OR with ortho on 09/06 for irrigation debridement of RLE. Endocrine following for BG. Was on vanc and zosyn. Echo without concern for vegetations. 2/2 Blood cultures and 2/2 repeats with Staph aureus. Wound cultures with Staph aureus and Group B  strep. Will follow cultures. ID consulted as suspect patient will need long course of abx; now changing to continuous oxacillin. CTA chest negative for PE, but notes small area of ground glass changes to RUL. Patient now with cough. Adding doxy for possible PNA. Ortho repeated I&D on 09/09. WBC remains elevated, but fever has resolved. Plastics consulted for flap eval. CTA RLE with moderate atherosclerosis and multifocal high grade stenosis throughout R calf arteries. Vascular surgery consulted per plastics recs as they would like to pre-optimiize blood flow prior to any free flap or pedicled propellar flap. RD consulted for malnutrition. Now with DEEPALI, but likely 2/2 infection and multiple images requiring contrast. Will need PT/OT consult prior to discharge.     Interval History: he reports feeling okay this morning, no new issuse reported, 2 BM overnight, and 1.3 L output and picking up. Cr improving at 2.7 and hit plateau at 3.3 it appears. On 2L oxygen from 3L and he denies any worsening dyspnea nor sputum. Still having scratchy throat and soreness with swallowing and speech to likely return tomorrow to reeval if can upgrade and how swallow is going and doxy stopped yesterday as may have been a contributor and will follow trends. Bp has been consistently higher end and on arb before admit so will do norvasc 5 given DEEPALI and cannot do that med at this time. Wbc improving to 15. Ast normalized at 39 now holding statin and home truvada while inpatient. Will plan for vioding trial without delvalle likely later this week once a few days of improving Cr. Discussed that angiogram would not be until Cr has fully stabilized so may be considered later in week and will have to check in with teams later in week and see how things are going to discuss further plans. He is happy to hear that labs are improving today. Pain doing okay as well so far. Endo followign and adjusting insulin and ID following as still TBD final  antibiotic plans, as rifampin may interact with truvada so determining if long term that may be option as well as plans regarding hardware removal are still TBD pending angiogram/viability of a potential flap still TBD as well for long term.        Review of Systems   Constitutional:  Positive for activity change and fatigue. Negative for chills and fever.   Respiratory:  Positive for cough. Negative for chest tightness and shortness of breath.    Cardiovascular:  Negative for chest pain and leg swelling.   Gastrointestinal:  Positive for nausea. Negative for abdominal pain.   Musculoskeletal:  Positive for arthralgias.   Skin:  Positive for color change and wound.   Neurological:  Negative for dizziness and weakness.     Objective:     Vital Signs (Most Recent):  Temp: 97.9 °F (36.6 °C) (09/15/24 1125)  Pulse: 88 (09/15/24 1125)  Resp: 17 (09/15/24 1125)  BP: (!) 174/79 (09/15/24 1125)  SpO2: 98 % (09/15/24 1125) Vital Signs (24h Range):  Temp:  [97.9 °F (36.6 °C)-98.7 °F (37.1 °C)] 97.9 °F (36.6 °C)  Pulse:  [] 88  Resp:  [16-18] 17  SpO2:  [95 %-100 %] 98 %  BP: (141-185)/(67-81) 174/79     Weight: 93 kg (205 lb 0.4 oz)  Body mass index is 34.12 kg/m².    Intake/Output Summary (Last 24 hours) at 9/15/2024 1131  Last data filed at 9/15/2024 0353  Gross per 24 hour   Intake --   Output 1725 ml   Net -1725 ml         Physical Exam  Vitals and nursing note reviewed.   Constitutional:       Appearance: He is well-developed. He is obese. He is ill-appearing.      Comments: At baseline mental status without return of confusion. On oxygen   Eyes:      Pupils: Pupils are equal, round, and reactive to light.   Cardiovascular:      Rate and Rhythm: Regular rhythm. Tachycardia present.      Comments: HR 90s  Pulmonary:      Effort: Pulmonary effort is normal.      Breath sounds: Rales (bibasilar) present.      Comments: On NC. Speaking in full sentenecs without stopping for dyspnea. No crackles. No increased RR on  exam. Breathing comfortably  Abdominal:      Palpations: Abdomen is soft.      Tenderness: There is no abdominal tenderness.   Musculoskeletal:         General: No tenderness.      Comments: Bandages in place. Wound vac with dark red output.   Skin:     General: Skin is warm and dry.      Comments: C/d/I dressing to R ankle with wound vac.    Neurological:      Mental Status: He is alert and oriented to person, place, and time.      Comments: Close to mental status baseline, much improved   Psychiatric:         Behavior: Behavior normal.             Significant Labs: All pertinent labs within the past 24 hours have been reviewed.  CBC:   Recent Labs   Lab 09/14/24  0337 09/15/24  0241   WBC 18.70* 15.08*   HGB 8.3* 8.6*   HCT 24.4* 25.2*   * 651*     CMP:   Recent Labs   Lab 09/14/24  0337 09/15/24  0241   * 130*   K 4.1 4.4    104   CO2 17* 16*   * 154*   BUN 46* 43*   CREATININE 3.3* 2.7*   CALCIUM 8.0* 7.9*   PROT 6.0 6.2   ALBUMIN 1.2* 1.3*   BILITOT 0.4 0.4   ALKPHOS 413* 361*   AST 49* 39   ALT 7* 7*   ANIONGAP 9 10       Significant Imaging: I have reviewed all pertinent imaging results/findings within the past 24 hours.    Assessment/Plan:      * Surgical wound breakdown  Closed trimalleolar fracture of right ankle s/p ORIF in July  63 yo M presenting with AMS and worsening redness, swelling and pain to R ankle.     - continue oxacillin  - Ortho consulted:   - taken to OR 09/06 for debridement of surgical wound with wound vac placed. CX with group B strep and staph.   - Repeat I&D of R medial ankle on 09/09   - recommending plastics eval for free flap  Wound vac in place  - follow wound cultures -- Staph aureus and GBS  - follow blood cultures -- Staph aureus.   - ID following:  - Continue IV oxacillin 12 g q 24 hours continuous infusion.  Monitor liver enzymes.   - Anticipate 6 weeks of IV antibiotics from date of most recent washout.    - Once blood cxs remain cleared for 72hrs,  will consider adding adjunctive rifampin (isolate is susceptible) rifampin for biofilm penetration if hardware is to remain.  - Anticipate long term oral  antibiotic suppression following IV abx   - Will follow.    - Plastic consulted:   - CTA reviewed, given numerous areas of high grade stenosis, will need Vascular consult for possible angiogram to pre-optimiize blood flow prior to any free flap or pedicled propellar flap. Angiogram on hold for DEEPALI  - continue optimization of BG, endocrinology on board  - ID on board, continue  IV abx. Current wbc remains elevated but starting to improve 9/13  - orthopedics on board to assess for source control, possible hardware removal.  - malnutition: will need optimization prior to OR. Prealbumin and transferrin low on last check.   - Plan: after medically optimized, plan for flap closure of wound.   - Vascular surgery consulted; appreciate recs . Angiogram aborted 9/12 for mental status as well as 9/13 for DEEPALI. Will replan once Cr improves.    Hyperphosphatemia  Binder started 9/14 as elevated from DEEPALI.      Dysphagia  Coughing on exam witnesseed 9/13 with water and some secretions in airway on CT, speech eval placed and rec continue liquids at this time  Protnoix started as some reflux may be contributing, and doxy stopped 9/14 in case contributing also. Still some scratchinses, using spray to throat on 9/15, speech to reeval 9/16 on monday      Constipation  Had issue last admit with prolonged constipation requiring enemas  -on bowel regimen  -has some bowel gas seen throughout CT abdomen, will giev suppository 9/13 to stay ahead and BM reported and 2 BMs 9/14      Bacteriuria  Seen on UA after delvalle for retention, per ID hold on teratment now as unclear if symptoms, reports 1 day of urine burning this week  -will f/u cx and has no growth      Airway malacia  Seen on CT, unclear cause as no hx of prologned intubation, etc.  -continu supportiv care, nebs, IS, humidified  oxygen      Acute retention of urine  Required delvalle 9/12 for retenetion, per nursing had had issues on/off the last few adys and had immediate return of >300 cc once placed and very concentrated dark urine. Urien with sediment now and darker yellow on exam 9/13. Questionable infectious process with bacerueia, hold treatment for now per ID and monitor. DEEPALI, unclear cause if from retention, renal consulted, will f/u trends  Plan for voiding trial later in week once cr improves consistently      Acute metabolic encephalopathy  Likely from meds versed, fentanyl for anioggram that was aborted on 9/12 due to change in mental status with poor clearance with DEEPALI as change in metnal status drastically after this was given 9/12, CT head wnl, ammonia/lactate, ck okay  Much improving mental status 9/13. Watch closely      Leukocytosis  Starting to improve 9/13 and now 15 on 9/15      Sinus tachycardia  Suspect from volume down, IVF with improvement from 110s to 90s on 9/13      PAD (peripheral artery disease)  Workup with vascular now, THEO, high grade stenosis on CTA, possible aniogram pending cr improvement      Acute blood loss anemia  Anemia is likely due to acute blood loss which was from surgery . Most recent hemoglobin and hematocrit are listed below.  Recent Labs     09/10/24  0439 09/11/24  1041 09/12/24  0537   HGB 8.4* 9.0* 8.5*   HCT 25.9* 27.1* 26.0*     Plan  - Monitor serial CBC: Daily  - Transfuse PRBC if patient becomes hemodynamically unstable, symptomatic or H/H drops below 7/21.  - Patient has not received any PRBC transfusions to date  - Patient's anemia is currently stable  -     Cough  Covering doxy for URI, on mucinex, mostly dry but now with some scant white/clear sputum on 9./13 and sputum resolved now and doxy stopped 9/;14 per ID      Transaminitis  - liver enzymes up and down, highest ast at 155, between   Now at 96-80---49 and now normalized  - will hold truvada for now  - monitor with daily  CMP    On pre-exposure prophylaxis for HIV  - holding truvada for transaminitis     MSSA bacteremia  - 2/2 blood cx with Staph aureus. 2/2 repeats positive. Repeats pending but NGTD  - Follow repeat blood cx  - ID following: start continuous oxacillin   - echo without concern for vegetations     Sepsis  This patient does have evidence of infective focus  My overall impression is sepsis.  Source: Skin and Soft Tissue (location ankle)  Antibiotics given-   Antibiotics (72h ago, onward)      Start     Stop Route Frequency Ordered    09/09/24 1730  oxacillin 12 g in  mL CONTINUOUS INFUSION         -- IV Every 24 hours (non-standard times) 09/09/24 1630          Latest lactate reviewed-  Recent Labs   Lab 09/12/24  1618 09/12/24  2216   LACTATE 0.6  --    POCLAC  --  <0.30*       Organ dysfunction indicated by Acute kidney injury and Encephalopathy    Fluid challenge Actual Body weight- Patient will receive 30ml/kg actual body weight to calculate fluid bolus for treatment of septic shock.     Post- resuscitation assessment No - Post resuscitation assessment not needed     Will Not start Pressors-   Source control achieved by: see above  -secondary to surgical wound infeection in right ankle, with 9/6 wound Cx with group B strep and staph with bacteremia with 9/7 and 9/6  blood with staph with 9/8 and 9/10 blood Cx NGTD  ID following. Echo without signs of vegtations. On oxacillin now. Wbc still higher at 22, and ID following, procal 2 on 9/12-->1 on 9/13. CRp improved to 235. Wbc starting to improve at 18 now. .doxy stopped given no PNA signs on CT. Reached out to ortho on 9/14 regarding ankle as no obvious other source of elevated wbc per discussion with ID to see if any plans to reassess wound under vac, last op note had necrosis and no obvious purulence. Wbc improving 9/15 at 15 now    Hyponatremia  Hyponatremia is likely due to Dehydration/hypovolemia. The patient's most recent sodium results are listed  below.  Recent Labs     09/11/24  1041 09/12/24  0538 09/13/24  0244   * 131* 131*       Plan  - Correct the sodium by 4-6mEq in 24 hours.   - Obtain the following studies: Urine sodium, urine osmolality, serum osmolality.  - Will treat the hyponatremia with IV fluids as follows: NS  - Monitor sodium Daily.   - Patient hyponatremia is similar to prev day, osm low normal ranges, urine osm pending  Last admit had similar low and improved with volume    Uncontrolled diabetes mellitus with hyperglycemia  Patient's FSGs are not controlled on current hypoglycemics.   Last A1c reviewed-   Lab Results   Component Value Date    LABA1C 10.8 (H) 08/15/2016    HGBA1C 8.5 (H) 09/06/2024     Most recent fingerstick glucose reviewed-   Recent Labs   Lab 09/12/24  2244 09/13/24  0452 09/13/24  0722 09/13/24  1059   POCTGLUCOSE 109 88 107 137*       Current correctional scale  Low  Maintain anti-hyperglycemic dose as follows-   Antihyperglycemics (From admission, onward)      Start     Stop Route Frequency Ordered    09/13/24 2100  insulin glargine U-100 (Lantus) pen 18 Units         -- SubQ Nightly 09/13/24 0730    09/13/24 0830  insulin aspart U-100 pen 0-10 Units         -- SubQ Every 4 hours PRN 09/13/24 0731           - Endocrine consulted:  - At this time, recommend that the patient remain off of his pump since he cannot be controlled in the Auto mode. Patient does not interact with pump frequently and relies on the auto mode algorithm.   - Lantus (Insulin Glargine) 35   - Novolog (Insulin Aspart) 9 units TIDWM (20% decrease given post-prandial BG below goal) (Hold if NPO) and prn for BG excursions MDC SSI (150/25)   - hold oral antihyperglycemics during hospitalization   - needs better BG control for optimal wound healing   Doses decreased on 9/12 for hypoglycemia this AM with endo managing and hypoglycemia again 9/13 in early AM overnight prior and endo adjusting further. On bariatric clear until speech eval    Closed  trimalleolar fracture of right ankle  S/p ORIF 07/2024  Ortho consulted   - see surgical wound breakdown above  NWB    Acute kidney injury (DEEPALI) with acute tubular necrosis (ATN)  DEEPALI is likely due to pre-renal azotemia due to dehydration. Baseline creatinine is  1 . Most recent creatinine and eGFR are listed below.  Recent Labs     09/13/24  0244 09/14/24  0337 09/15/24  0241   CREATININE 3.0* 3.3* 2.7*   EGFRNORACEVR 22.6* 20.2* 25.7*        Plan  - DEEPALI is worsening  - Avoid nephrotoxins and renally dose meds for GFR listed above  - Monitor urine output, serial BMP, and adjust therapy as needed  - baseline 1.6--2.1--2.6 now. EF on prev echo 70% so signs of volume depletion, as well as hyponatremia and tachycardia. Resume IVF 9/12   Renal consulted, rec stop IVF 9/13, ATN on urine microscopy with casts, secondary to sepsis/contrast likely combination and cr not at plateau yet with Cr 3.3 on 9/14 and supportive care in interim.  Improving now at 2.7 on 9/15 with output picking up at 1.3 L.     Acute hypoxemic respiratory failure  Patient with Hypoxic Respiratory failure which is Acute.  he is not on home oxygen. Supplemental oxygen was provided and noted-      .   Signs/symptoms of respiratory failure include- tachypnea and lethargy. Contributing diagnoses includes - Obesity Hypoventilation and Pneumonia Labs and images were reviewed. Patient Has not had a recent ABG. Will treat underlying causes and adjust management of respiratory failure as follows-     - ABG on 9/12 without major abnormality, P02 89, [id number], continue oxygen now, CT chest showing tracheomalachia/hyperinflation lungs. Has no hx of excessive intubation or lung dx known to him. Using IS on exam 9/13 and speaking in full sentences on exam, with small amount of clear/white sputum. No obvious signs of volume overload, some cough on exam and keep on liquid diet per speech recs. Stop doxy on 9/14 as no signs of PNA on exam and could be contributor to  "some of dysphagia per ID. Wean oxygen as tolerates, sputum resolved per patient. Breathing comfortable on exam. On 2L now on 9/15 and sats high 90s      - CTA ordered by ortho/anesthesia prior to I&D. Negative for PE, but small ground glass area to RUL representing infectious vs. Inflammatory process.  - doxy added for pneumonia coverage.  - mucinex BID, acapella, and nebulized saline . Has IS at bedside on exam 9/12  - prn duo nebs   9/8: no signs of overload. Left Ventricle: The left ventricle is normal in size. Normal wall thickness. There is normal systolic function with a visually estimated ejection fraction of 65 - 70%. There is normal diastolic function.    Right Ventricle: Normal right ventricular cavity size. Wall thickness is normal. Systolic function is normal.    IVC/SVC: Normal venous pressure at 3 mmHg.    No evidence of intracardiac mass or vegetation.    Metabolic acidosis  Likely secondary to DEEPALI  Start Na bicarb on 9/12      Uncontrolled type 2 diabetes mellitus with diabetic polyneuropathy, with long-term current use of insulin  Patient's FSGs are uncontrolled due to hyperglycemia on current medication regimen.  Last A1c reviewed-   Lab Results   Component Value Date    LABA1C 10.8 (H) 08/15/2016    HGBA1C 9.9 (H) 07/18/2024     Most recent fingerstick glucose reviewed- No results for input(s): "POCTGLUCOSE" in the last 24 hours.  Current correctional scale  Low  Maintain anti-hyperglycemic dose as follows-   Antihyperglycemics (From admission, onward)      Start     Stop Route Frequency Ordered    09/06/24 0506  insulin aspart U-100 pen 0-5 Units         -- SubQ Every 6 hours PRN 09/06/24 0406          Hold Oral hypoglycemics while patient is in the hospital.      VTE Risk Mitigation (From admission, onward)           Ordered     enoxaparin injection 30 mg  Daily         09/13/24 0757     IP VTE HIGH RISK PATIENT  Once         09/06/24 1735                    Discharge Planning   JAYLEEN: 9/16/2024 "     Code Status: Full Code   Is the patient medically ready for discharge?:     Reason for patient still in hospital (select all that apply): Patient trending condition  Discharge Plan A: Home Health                  Maria Del Rosario Medina MD  Department of Hospital Medicine   [facility]

## 2024-09-15 NOTE — ASSESSMENT & PLAN NOTE
Patient with Hypoxic Respiratory failure which is Acute.  he is not on home oxygen. Supplemental oxygen was provided and noted-      .   Signs/symptoms of respiratory failure include- tachypnea and lethargy. Contributing diagnoses includes - Obesity Hypoventilation and Pneumonia Labs and images were reviewed. Patient Has not had a recent ABG. Will treat underlying causes and adjust management of respiratory failure as follows-     - ABG on 9/12 without major abnormality, P02 89, [id number], continue oxygen now, CT chest showing tracheomalachia/hyperinflation lungs. Has no hx of excessive intubation or lung dx known to him. Using IS on exam 9/13 and speaking in full sentences on exam, with small amount of clear/white sputum. No obvious signs of volume overload, some cough on exam and keep on liquid diet per speech recs. Stop doxy on 9/14 as no signs of PNA on exam and could be contributor to some of dysphagia per ID. Wean oxygen as tolerates, sputum resolved per patient. Breathing comfortable on exam. On 2L now on 9/15 and sats high 90s      - CTA ordered by ortho/anesthesia prior to I&D. Negative for PE, but small ground glass area to RUL representing infectious vs. Inflammatory process.  - doxy added for pneumonia coverage.  - mucinex BID, acapella, and nebulized saline . Has IS at bedside on exam 9/12  - prn duo nebs   9/8: no signs of overload. Left Ventricle: The left ventricle is normal in size. Normal wall thickness. There is normal systolic function with a visually estimated ejection fraction of 65 - 70%. There is normal diastolic function.    Right Ventricle: Normal right ventricular cavity size. Wall thickness is normal. Systolic function is normal.    IVC/SVC: Normal venous pressure at 3 mmHg.    No evidence of intracardiac mass or vegetation.

## 2024-09-15 NOTE — PROGRESS NOTES
Nurses Note -- 4 Eyes      9/15/2024   7:29 AM      Skin assessed during: Q Shift Change      [x] No Altered Skin Integrity Present    []Prevention Measures Documented      [] Yes- Altered Skin Integrity Present or Discovered   [] LDA Added if Not in Epic (Describe Wound)   [] New Altered Skin Integrity was Present on Admit and Documented in LDA   [] Wound Image Taken    Wound Care Consulted? No    Attending Nurse:  elvia Hoffmann RN/Staff Member:   hannah

## 2024-09-15 NOTE — ASSESSMENT & PLAN NOTE
Required delvalle 9/12 for retenetion, per nursing had had issues on/off the last few adys and had immediate return of >300 cc once placed and very concentrated dark urine. Urien with sediment now and darker yellow on exam 9/13. Questionable infectious process with bacerueia, hold treatment for now per ID and monitor. DEEPALI, unclear cause if from retention, renal consulted, will f/u trends  Plan for voiding trial later in week once cr improves consistently

## 2024-09-16 LAB
ALBUMIN SERPL BCP-MCNC: 1.3 G/DL (ref 3.5–5.2)
ALP SERPL-CCNC: 267 U/L (ref 55–135)
ALT SERPL W/O P-5'-P-CCNC: 6 U/L (ref 10–44)
ANION GAP SERPL CALC-SCNC: 9 MMOL/L (ref 8–16)
AST SERPL-CCNC: 24 U/L (ref 10–40)
BILIRUB SERPL-MCNC: 0.4 MG/DL (ref 0.1–1)
BUN SERPL-MCNC: 34 MG/DL (ref 8–23)
CALCIUM SERPL-MCNC: 8.1 MG/DL (ref 8.7–10.5)
CHLORIDE SERPL-SCNC: 104 MMOL/L (ref 95–110)
CO2 SERPL-SCNC: 18 MMOL/L (ref 23–29)
CREAT SERPL-MCNC: 1.8 MG/DL (ref 0.5–1.4)
ERYTHROCYTE [DISTWIDTH] IN BLOOD BY AUTOMATED COUNT: 15.8 % (ref 11.5–14.5)
EST. GFR  (NO RACE VARIABLE): 41.8 ML/MIN/1.73 M^2
GLUCOSE SERPL-MCNC: 187 MG/DL (ref 70–110)
HBV CORE AB SERPL QL IA: REACTIVE
HBV SURFACE AB SER-ACNC: 14.92 MIU/ML
HBV SURFACE AB SER-ACNC: REACTIVE M[IU]/ML
HBV SURFACE AG SERPL QL IA: NORMAL
HCT VFR BLD AUTO: 25.7 % (ref 40–54)
HGB BLD-MCNC: 8.4 G/DL (ref 14–18)
MCH RBC QN AUTO: 27.6 PG (ref 27–31)
MCHC RBC AUTO-ENTMCNC: 32.7 G/DL (ref 32–36)
MCV RBC AUTO: 85 FL (ref 82–98)
PHOSPHATE SERPL-MCNC: 3.5 MG/DL (ref 2.7–4.5)
PLATELET # BLD AUTO: 694 K/UL (ref 150–450)
PMV BLD AUTO: 9.4 FL (ref 9.2–12.9)
POCT GLUCOSE: 192 MG/DL (ref 70–110)
POCT GLUCOSE: 198 MG/DL (ref 70–110)
POCT GLUCOSE: 203 MG/DL (ref 70–110)
POCT GLUCOSE: 208 MG/DL (ref 70–110)
POCT GLUCOSE: 209 MG/DL (ref 70–110)
POCT GLUCOSE: 257 MG/DL (ref 70–110)
POCT GLUCOSE: 267 MG/DL (ref 70–110)
POTASSIUM SERPL-SCNC: 4.1 MMOL/L (ref 3.5–5.1)
PROT SERPL-MCNC: 5.9 G/DL (ref 6–8.4)
RBC # BLD AUTO: 3.04 M/UL (ref 4.6–6.2)
SODIUM SERPL-SCNC: 131 MMOL/L (ref 136–145)
WBC # BLD AUTO: 14.14 K/UL (ref 3.9–12.7)

## 2024-09-16 PROCEDURE — 63600175 PHARM REV CODE 636 W HCPCS: Performed by: STUDENT IN AN ORGANIZED HEALTH CARE EDUCATION/TRAINING PROGRAM

## 2024-09-16 PROCEDURE — 25000003 PHARM REV CODE 250: Performed by: INTERNAL MEDICINE

## 2024-09-16 PROCEDURE — 84100 ASSAY OF PHOSPHORUS: CPT | Performed by: HOSPITALIST

## 2024-09-16 PROCEDURE — 99233 SBSQ HOSP IP/OBS HIGH 50: CPT | Mod: ,,, | Performed by: STUDENT IN AN ORGANIZED HEALTH CARE EDUCATION/TRAINING PROGRAM

## 2024-09-16 PROCEDURE — 86706 HEP B SURFACE ANTIBODY: CPT | Mod: 91 | Performed by: STUDENT IN AN ORGANIZED HEALTH CARE EDUCATION/TRAINING PROGRAM

## 2024-09-16 PROCEDURE — 92526 ORAL FUNCTION THERAPY: CPT

## 2024-09-16 PROCEDURE — 86704 HEP B CORE ANTIBODY TOTAL: CPT | Performed by: STUDENT IN AN ORGANIZED HEALTH CARE EDUCATION/TRAINING PROGRAM

## 2024-09-16 PROCEDURE — 25000003 PHARM REV CODE 250

## 2024-09-16 PROCEDURE — 80053 COMPREHEN METABOLIC PANEL: CPT | Performed by: HOSPITALIST

## 2024-09-16 PROCEDURE — 36415 COLL VENOUS BLD VENIPUNCTURE: CPT | Performed by: STUDENT IN AN ORGANIZED HEALTH CARE EDUCATION/TRAINING PROGRAM

## 2024-09-16 PROCEDURE — 99232 SBSQ HOSP IP/OBS MODERATE 35: CPT | Mod: ,,,

## 2024-09-16 PROCEDURE — 63600175 PHARM REV CODE 636 W HCPCS: Performed by: HOSPITALIST

## 2024-09-16 PROCEDURE — 21400001 HC TELEMETRY ROOM

## 2024-09-16 PROCEDURE — 99233 SBSQ HOSP IP/OBS HIGH 50: CPT | Mod: ,,, | Performed by: INTERNAL MEDICINE

## 2024-09-16 PROCEDURE — 87340 HEPATITIS B SURFACE AG IA: CPT | Performed by: STUDENT IN AN ORGANIZED HEALTH CARE EDUCATION/TRAINING PROGRAM

## 2024-09-16 PROCEDURE — 25000003 PHARM REV CODE 250: Performed by: PHYSICIAN ASSISTANT

## 2024-09-16 PROCEDURE — 85027 COMPLETE CBC AUTOMATED: CPT | Performed by: HOSPITALIST

## 2024-09-16 PROCEDURE — 25000003 PHARM REV CODE 250: Performed by: HOSPITALIST

## 2024-09-16 PROCEDURE — 36415 COLL VENOUS BLD VENIPUNCTURE: CPT | Performed by: HOSPITALIST

## 2024-09-16 PROCEDURE — 25000003 PHARM REV CODE 250: Performed by: STUDENT IN AN ORGANIZED HEALTH CARE EDUCATION/TRAINING PROGRAM

## 2024-09-16 RX ORDER — INSULIN ASPART 100 [IU]/ML
3-6 INJECTION, SOLUTION INTRAVENOUS; SUBCUTANEOUS
Status: DISCONTINUED | OUTPATIENT
Start: 2024-09-16 | End: 2024-09-17

## 2024-09-16 RX ORDER — GUAIFENESIN 600 MG/1
600 TABLET, EXTENDED RELEASE ORAL 2 TIMES DAILY
Status: DISCONTINUED | OUTPATIENT
Start: 2024-09-16 | End: 2024-09-22

## 2024-09-16 RX ORDER — INSULIN GLARGINE 100 [IU]/ML
20 INJECTION, SOLUTION SUBCUTANEOUS NIGHTLY
Status: DISCONTINUED | OUTPATIENT
Start: 2024-09-16 | End: 2024-09-17

## 2024-09-16 RX ORDER — BISACODYL 10 MG/1
10 SUPPOSITORY RECTAL DAILY PRN
Status: CANCELLED | OUTPATIENT
Start: 2024-09-16

## 2024-09-16 RX ADMIN — PANTOPRAZOLE SODIUM 40 MG: 40 TABLET, DELAYED RELEASE ORAL at 08:09

## 2024-09-16 RX ADMIN — SODIUM BICARBONATE 650 MG TABLET 1300 MG: at 08:09

## 2024-09-16 RX ADMIN — NORTRIPTYLINE HYDROCHLORIDE 50 MG: 25 CAPSULE ORAL at 10:09

## 2024-09-16 RX ADMIN — HYDRALAZINE HYDROCHLORIDE 25 MG: 25 TABLET ORAL at 05:09

## 2024-09-16 RX ADMIN — MORPHINE SULFATE 2 MG: 2 INJECTION, SOLUTION INTRAMUSCULAR; INTRAVENOUS at 10:09

## 2024-09-16 RX ADMIN — SUCRALFATE 1 G: 1 SUSPENSION ORAL at 12:09

## 2024-09-16 RX ADMIN — ENOXAPARIN SODIUM 30 MG: 30 INJECTION SUBCUTANEOUS at 05:09

## 2024-09-16 RX ADMIN — INSULIN ASPART 6 UNITS: 100 INJECTION, SOLUTION INTRAVENOUS; SUBCUTANEOUS at 05:09

## 2024-09-16 RX ADMIN — MORPHINE SULFATE 2 MG: 2 INJECTION, SOLUTION INTRAMUSCULAR; INTRAVENOUS at 05:09

## 2024-09-16 RX ADMIN — INSULIN ASPART 4 UNITS: 100 INJECTION, SOLUTION INTRAVENOUS; SUBCUTANEOUS at 12:09

## 2024-09-16 RX ADMIN — SODIUM BICARBONATE 650 MG TABLET 1300 MG: at 05:09

## 2024-09-16 RX ADMIN — AMLODIPINE BESYLATE 10 MG: 10 TABLET ORAL at 08:09

## 2024-09-16 RX ADMIN — SUCRALFATE 1 G: 1 SUSPENSION ORAL at 05:09

## 2024-09-16 RX ADMIN — INSULIN GLARGINE 20 UNITS: 100 INJECTION, SOLUTION SUBCUTANEOUS at 11:09

## 2024-09-16 RX ADMIN — OXACILLIN 12 G: 2 INJECTION, POWDER, FOR SOLUTION INTRAMUSCULAR; INTRAVENOUS at 11:09

## 2024-09-16 RX ADMIN — SODIUM BICARBONATE 650 MG TABLET 1300 MG: at 10:09

## 2024-09-16 RX ADMIN — SUCRALFATE 1 G: 1 SUSPENSION ORAL at 11:09

## 2024-09-16 RX ADMIN — INSULIN ASPART 2 UNITS: 100 INJECTION, SOLUTION INTRAVENOUS; SUBCUTANEOUS at 05:09

## 2024-09-16 RX ADMIN — GUAIFENESIN 600 MG: 600 TABLET, EXTENDED RELEASE ORAL at 10:09

## 2024-09-16 RX ADMIN — SENNOSIDES 8.6 MG: 8.6 TABLET, FILM COATED ORAL at 08:09

## 2024-09-16 RX ADMIN — INSULIN ASPART 3 UNITS: 100 INJECTION, SOLUTION INTRAVENOUS; SUBCUTANEOUS at 11:09

## 2024-09-16 NOTE — SUBJECTIVE & OBJECTIVE
"Interval HPI:   No acute events overnight. Patient in room 542/542 A. Blood glucose stable. BG at/above goal on current insulin regimen (SSI, basal insulin ). Steroid use- None. Renal function improving. BG elevating above goal.      Renal function-   Lab Results   Component Value Date    CREATININE 1.8 (H) 2024          Vasopressors-  None      Diet Clear liquid (no sugar)/Bariatric      Eatin%  Nausea: No  Hypoglycemia and intervention: No   Fever: No  TPN and/or TF: No    BP (!) 188/84 (Patient Position: Lying)   Pulse 94   Temp 97.9 °F (36.6 °C) (Oral)   Resp 18   Ht 5' 5" (1.651 m)   Wt 93 kg (205 lb 0.4 oz)   SpO2 98%   BMI 34.12 kg/m²     Labs Reviewed and Include    Recent Labs   Lab 24  0510   *   CALCIUM 8.1*   ALBUMIN 1.3*   PROT 5.9*   *   K 4.1   CO2 18*      BUN 34*   CREATININE 1.8*   ALKPHOS 267*   ALT 6*   AST 24   BILITOT 0.4     Lab Results   Component Value Date    WBC 14.14 (H) 2024    HGB 8.4 (L) 2024    HCT 25.7 (L) 2024    MCV 85 2024     (H) 2024     No results for input(s): "TSH", "FREET4" in the last 168 hours.  Lab Results   Component Value Date    HGBA1C 8.5 (H) 2024       Nutritional status:   Body mass index is 34.12 kg/m².  Lab Results   Component Value Date    ALBUMIN 1.3 (L) 2024    ALBUMIN 1.3 (L) 09/15/2024    ALBUMIN 1.2 (L) 2024     Lab Results   Component Value Date    PREALBUMIN 3 (L) 2024    PREALBUMIN 13 (L) 2024       Estimated Creatinine Clearance: 44 mL/min (A) (based on SCr of 1.8 mg/dL (H)).    Accu-Checks  Recent Labs     24  0720 24  1058 24  1616 24  2124 09/15/24  0723 09/15/24  1124 09/15/24  1532 24  0002 24  0516 24  0735   POCTGLUCOSE 153* 137* 129* 206* 152* 155* 224* 192* 198* 208*       Current Medications and/or Treatments Impacting Glycemic Control  Immunotherapy:    Immunosuppressants       None      "     Steroids:   Hormones (From admission, onward)      None          Pressors:    Autonomic Drugs (From admission, onward)      None          Hyperglycemia/Diabetes Medications:   Antihyperglycemics (From admission, onward)      Start     Stop Route Frequency Ordered    09/16/24 2100  insulin glargine U-100 (Lantus) pen 20 Units         -- SubQ Nightly 09/16/24 0830    09/13/24 0830  insulin aspart U-100 pen 0-10 Units         -- SubQ Every 4 hours PRN 09/13/24 0731

## 2024-09-16 NOTE — ASSESSMENT & PLAN NOTE
Coughing on exam witnesseed 9/13 with water and some secretions in airway on CT, speech eval placed and rec continue liquids at this time  Protnoix started as some reflux may be contributing, and doxy stopped 9/14 in case contributing also. Still some scratchinses, using spray to throat on 9/15, speech to reeval 9/16 on Monday  Requested soft diet as hates the broth, upgraded 9/16 and will see if speech today agrees

## 2024-09-16 NOTE — SUBJECTIVE & OBJECTIVE
Interval History: Remains AF, VSS, wbc 14 downtrend, crp 188 (downtrend), Cr 1.8 (downtrend from 3.3). Maintained on Iv-oxacillin. Ordered Hep B serologies prior to tentative discontinuation of truvada (HIV PrEP), and possible addition of rifampin.     Review of Systems   Constitutional:  Positive for appetite change.   Respiratory:  Positive for cough and shortness of breath.    Gastrointestinal:  Negative for nausea.   Musculoskeletal:  Positive for arthralgias and myalgias.   Skin:  Positive for color change and wound.   All other systems reviewed and are negative.      Objective:     Vital Signs (Most Recent):  Temp: 98.3 °F (36.8 °C) (09/16/24 1606)  Pulse: 99 (09/16/24 1619)  Resp: 18 (09/16/24 1702)  BP: (!) 158/68 (09/16/24 1606)  SpO2: 97 % (09/16/24 1606) Vital Signs (24h Range):  Temp:  [97.9 °F (36.6 °C)-98.3 °F (36.8 °C)] 98.3 °F (36.8 °C)  Pulse:  [] 99  Resp:  [16-18] 18  SpO2:  [95 %-98 %] 97 %  BP: (141-188)/(63-84) 158/68     Weight: 93 kg (205 lb 0.4 oz)  Body mass index is 34.12 kg/m².    Estimated Creatinine Clearance: 44 mL/min (A) (based on SCr of 1.8 mg/dL (H)).     Physical Exam  Vitals and nursing note reviewed.   Constitutional:       General: He is not in acute distress.     Appearance: Normal appearance. He is not toxic-appearing or diaphoretic.   HENT:      Nose: No congestion.      Mouth/Throat:      Pharynx: No oropharyngeal exudate.   Eyes:      General: No scleral icterus.     Conjunctiva/sclera: Conjunctivae normal.   Cardiovascular:      Rate and Rhythm: Normal rate and regular rhythm.      Heart sounds: No murmur heard.  Pulmonary:      Effort: Pulmonary effort is normal. No respiratory distress.      Breath sounds: No wheezing.   Abdominal:      General: Bowel sounds are normal. There is no distension.      Palpations: Abdomen is soft.      Tenderness: There is no abdominal tenderness.   Musculoskeletal:      Cervical back: Neck supple. No tenderness.      Comments: Right  ankle with surgical dressings in place + wound vac    Left wrist - no signs of infection   Skin:     General: Skin is warm and dry.   Neurological:      Mental Status: He is alert and oriented to person, place, and time.   Psychiatric:         Mood and Affect: Mood normal.         Behavior: Behavior normal.          Significant Labs: Blood Culture:   Recent Labs   Lab 09/06/24  0235 09/07/24  0910 09/08/24  1641 09/10/24  1711   LABBLOO Gram stain aer bottle: Gram positive cocci in clusters resembling Staph  Gram stain alicia bottle: Gram positive cocci in clusters resembling Staph  Results called to and read back by:Luciana Altamirano RN 09/06/2024  20:52  STAPHYLOCOCCUS AUREUS*  Gram stain aer bottle: Gram positive cocci in clusters resembling Staph  Gram stain alicia bottle: Gram positive cocci in clusters resembling Staph  Results called to and read back by:Luciana Altamirano RN 09/06/2024  20:55  STAPHYLOCOCCUS AUREUS  For susceptibility see order #[id number]  * Gram stain aer bottle: Gram positive cocci in clusters resembling Staph  Results called to and read back by: Roseanne Guevara RN 09/08/2024  14:07  Gram stain alicia bottle: Gram positive cocci in clusters resembling Staph  Positive results previously called 09/08/2024  STAPHYLOCOCCUS AUREUS  ID consult required at [facility].  For susceptibility see order #[id number]  *  Gram stain aer bottle: Gram positive cocci in clusters resembling Staph  Gram stain alicia bottle: Gram positive cocci in clusters resembling Staph  Results called to and read back by:Luciana Altamirano LPN 09/08/2024  03:59  STAPHYLOCOCCUS AUREUS  ID consult required at [facility].  For susceptibility see order #[id number]  * No growth after 5 days.  No growth after 5 days. No growth after 5 days.     CBC:   Recent Labs   Lab 09/15/24  0241 09/16/24  0510   WBC 15.08* 14.14*   HGB 8.6* 8.4*   HCT 25.2* 25.7*   *  694*     CMP:   Recent Labs   Lab 09/15/24  0241 09/16/24  0510   * 131*   K 4.4 4.1    104   CO2 16* 18*   * 187*   BUN 43* 34*   CREATININE 2.7* 1.8*   CALCIUM 7.9* 8.1*   PROT 6.2 5.9*   ALBUMIN 1.3* 1.3*   BILITOT 0.4 0.4   ALKPHOS 361* 267*   AST 39 24   ALT 7* 6*   ANIONGAP 10 9     Microbiology Results (last 7 days)       Procedure Component Value Units Date/Time    Blood culture [[id number]] Collected: 09/10/24 1711    Order Status: Completed Specimen: Blood Updated: 09/15/24 2012     Blood Culture, Routine No growth after 5 days.    Urine culture [[id number]] Collected: 09/12/24 1543    Order Status: Completed Specimen: Urine Updated: 09/13/24 2309     Urine Culture, Routine No growth    Narrative:      add on Urine Creatinine and Urine Protein pe Hernandez   NP/order#[id number] on 09/13/2024 @0025.    Specimen Source->Urine    Blood culture [[id number]] Collected: 09/08/24 1641    Order Status: Completed Specimen: Blood Updated: 09/13/24 2012     Blood Culture, Routine No growth after 5 days.    Narrative:      [id number]    Blood culture [[id number]] Collected: 09/08/24 1641    Order Status: Completed Specimen: Blood Updated: 09/13/24 2012     Blood Culture, Routine No growth after 5 days.    Narrative:      08271    Culture, Anaerobe [[id number]] Collected: 09/06/24 1446    Order Status: Completed Specimen: Incision site from Ankle, Right Updated: 09/12/24 1315     Anaerobic Culture No anaerobes isolated    Narrative:      Right ankle wound - culture    Blood culture x two cultures. Draw prior to antibiotics. [[id number]]  (Abnormal)  (Susceptibility) Collected: 09/06/24 0235    Order Status: Completed Specimen: Blood from Peripheral, Antecubital, Left Updated: 09/11/24 1203     Blood Culture, Routine Gram stain aer bottle: Gram positive cocci in clusters resembling Staph      Gram stain alicia bottle: Gram positive cocci in clusters resembling Staph      Results called to and read back  by:Luciana Altamirano RN 09/06/2024      20:52      STAPHYLOCOCCUS AUREUS    Narrative:      Aerobic and anaerobic    Blood culture [[id number]]  (Abnormal) Collected: 09/07/24 0910    Order Status: Completed Specimen: Blood Updated: 09/10/24 1000     Blood Culture, Routine Gram stain aer bottle: Gram positive cocci in clusters resembling Staph      Results called to and read back by: Roseanne Guevara RN 09/08/2024  14:07      Gram stain alicia bottle: Gram positive cocci in clusters resembling Staph      Positive results previously called 09/08/2024      STAPHYLOCOCCUS AUREUS  ID consult required at [facility].  For susceptibility see order #[id number]      Blood culture [[id number]]  (Abnormal) Collected: 09/07/24 0910    Order Status: Completed Specimen: Blood Updated: 09/10/24 0959     Blood Culture, Routine Gram stain aer bottle: Gram positive cocci in clusters resembling Staph      Gram stain alicia bottle: Gram positive cocci in clusters resembling Staph      Results called to and read back by:Luciana Altamirano LPN 09/08/2024      03:59      STAPHYLOCOCCUS AUREUS  ID consult required at [facility].  For susceptibility see order #[id number]            Wound Culture:   Recent Labs   Lab 09/06/24  1446   LABAERO STAPHYLOCOCCUS AUREUS  Many  *  STREPTOCOCCUS AGALACTIAE (GROUP B)  Many  Beta-hemolytic streptococci are routinely susceptible to   penicillins,cephalosporins and carbapenems.  *     All pertinent labs within the past 24 hours have been reviewed.    Significant Imaging: I have reviewed all pertinent imaging results/findings within the past 24 hours.    I have personally reviewed records / hospital notes from   service and other specialty providers. I have also reviewed CBC, CMP/BMP,  cultures and imaging with my interpretation as documented in my assessment / plan.    Patient is high risk for infectious complications given pt's age, multiple  co-morbidities, and case complexity.      Time: 50 minutes   50% of time spent on face-to-face counseling and coordination of care. Counseling included review of test results, diagnosis, and treatment plan with patient and/or family.

## 2024-09-16 NOTE — ASSESSMENT & PLAN NOTE
Endocrinology consulted for BG management.   BG goal 140-180    - Lantus (Insulin Glargine 20 units nightly (10% increase due to fasting BG above goal)   - [facility] SSI (150/25)  - BG checks q4hr  - Hypoglycemia protocol in place    ** Please notify Endocrine for any change and/or advance in diet**  ** Please call Endocrine for any BG related issues **    Discharge Planning:   TBD. Please notify endocrinology prior to discharge.

## 2024-09-16 NOTE — ASSESSMENT & PLAN NOTE
Covering doxy for URI, on mucinex, mostly dry but now with some scant white/clear sputum on 9./13 and sputum resolved now and doxy stopped 9/;14 per ID  Mucinex stopped 9/16 as he reports making him nauseated, changed back to pill form

## 2024-09-16 NOTE — PROGRESS NOTES
[facility]  Infectious Disease  Progress Note    Patient Name: oCrtes Schroeder  MRN: [id number]  Admission Date: 9/6/2024  Length of Stay: 10 days  Attending Physician: Maria Del Rosario Medina MD  Primary Care Provider: Andrew Sinclair Jr., MD    Isolation Status: No active isolations  Assessment/Plan:      ID  Surgical site infection  I have reviewed hospital notes from   service and other specialty providers. I have also reviewed CBC, CMP/BMP,  cultures and imaging with my interpretation as documented.      64M with poorly controlled DM (A1C 8.5), recent fall resulting in left wrist and right ankle fractures 07/18/24 s/p ex fix Rt ankle and ORIF left wrist 07/19, with subsequent ORIF right ankle on 7/26/24 with Dr. Liz -- now c/b MSSA deep surgical site infection of right ankle involving underlying hardware with associated MSSA bacteremia.        S/p I&D on 09/06 and 9/9.  Op report noting purulence overlying hardware.  No plans for hardware removal at this time.  Unable to achieve primary closure and Plastic Surgery consulted.   A CTA of  lower extremity showed multifocal high grade stenosis throughout right calf, though no occlusion and Vascular Surgery consulted. Right TBI .41. Attempted angiogram 9/12, but developed extreme confusion/AMS and rapid respiratory rate  with versed/fentanyl and procedure postponed.   Further plans for angiogram still deferred for given DEEPALI (Nephrology following).   Plastics recommendations are pending Vascular workup.      Surgical cxs +GBS, MSSA.  Blood cultures 9/6 and 9/7 + for MSSA.  Repeat blood cultures 9/8, 9/11 NGTD.  2D echo negative.   Remains on IV oxacillin.        Afebrile.  WBC now downtrending - 14.  Creatinine 1.8 (downtrend). Liver enzymes normalized.    Recommendations / Plan:  Continue IV oxacillin 12 g q 24 hours continuous infusion.   Monitor creatinine clearance.  No dosage adjustment currently recommended, though neurotoxicity has been reported  for cr cl <10.   To complete abx duration of 6wks s/p last I&D 09/09, DNO 10/21.   Ordered Hep B serologies before tentative discontinuation of Truvada, and addition of adjunctive rifampin for biofilm penetration of retained hardware   Follow up Vascular/Plastics/Orthpedic plans  So long as hardware remains, anticipate long term oral antibiotic suppression following course of IV abx     -- Discussed with ID staff and primary team   -- ID will continue to follow w/ further recs.            Thank you for your consult. I will follow-up with patient. Please contact us if you have any additional questions.    Andrew Kearns PA-C  Infectious Disease  [facility]    Subjective:     Principal Problem:Surgical wound breakdown    HPI: Cortes Schroeder is a 64 year old with HTN, poorly controlled DM (A1C 8.5), recent history of syncopal episodes, and  right ankle fracture on 7/18/24 s/p ex fix with subsequent ORIF on 7/26/24 with Dr. Liz.  At outpatient Orthopedic follow up surgical wound was healing and sutures removed.  Since then patient  reportedly doing well until about 1 week ago when he noticed some drainage from his surgical incisions.  Over the past week he developed fevers, chills, and night sweats.  Yesterday he became confused/altered and was brought to ED.  In ED was hypotensive, tachycardic, WBC 18, .  Sepsis protocol initiated and started on clindamycin has been doing well postoperatively until around 1 week ago when he started noticing some drainage from his surgical incisions. He started to develop fevers, chills and night sweats over the past week. Yesterday afternoon, patient became altered and was brought to the ED by his roommate. Patient currently lives in [place]. Upon presentation to the ED, patient was tachycardic, hypotensive and afebrile. WBC of 18.68, .3. Sepsis protocol initiated. Patient was started on clindamycin and vancomycin.  Now on vancomycin and cefepime.        Orthopedics consulted.  Noted draining wound over the medial side of the right ankle as well as eschar formation over the lateral side of the ankle.  Right ankle and foot noted to be erythematous and edematous.  Pending surgical I&D today.           Interval History: Remains AF, VSS, wbc 14 downtrend, crp 188 (downtrend), Cr 1.8 (downtrend from 3.3). Maintained on Iv-oxacillin. Ordered Hep B serologies prior to tentative discontinuation of truvada (HIV PrEP), and possible addition of rifampin.     Review of Systems   Constitutional:  Positive for appetite change.   Respiratory:  Positive for cough and shortness of breath.    Gastrointestinal:  Negative for nausea.   Musculoskeletal:  Positive for arthralgias and myalgias.   Skin:  Positive for color change and wound.   All other systems reviewed and are negative.      Objective:     Vital Signs (Most Recent):  Temp: 98.3 °F (36.8 °C) (09/16/24 1606)  Pulse: 99 (09/16/24 1619)  Resp: 18 (09/16/24 1702)  BP: (!) 158/68 (09/16/24 1606)  SpO2: 97 % (09/16/24 1606) Vital Signs (24h Range):  Temp:  [97.9 °F (36.6 °C)-98.3 °F (36.8 °C)] 98.3 °F (36.8 °C)  Pulse:  [] 99  Resp:  [16-18] 18  SpO2:  [95 %-98 %] 97 %  BP: (141-188)/(63-84) 158/68     Weight: 93 kg (205 lb 0.4 oz)  Body mass index is 34.12 kg/m².    Estimated Creatinine Clearance: 44 mL/min (A) (based on SCr of 1.8 mg/dL (H)).     Physical Exam  Vitals and nursing note reviewed.   Constitutional:       General: He is not in acute distress.     Appearance: Normal appearance. He is not toxic-appearing or diaphoretic.   HENT:      Nose: No congestion.      Mouth/Throat:      Pharynx: No oropharyngeal exudate.   Eyes:      General: No scleral icterus.     Conjunctiva/sclera: Conjunctivae normal.   Cardiovascular:      Rate and Rhythm: Normal rate and regular rhythm.      Heart sounds: No murmur heard.  Pulmonary:      Effort: Pulmonary effort is normal. No respiratory distress.      Breath sounds: No  wheezing.   Abdominal:      General: Bowel sounds are normal. There is no distension.      Palpations: Abdomen is soft.      Tenderness: There is no abdominal tenderness.   Musculoskeletal:      Cervical back: Neck supple. No tenderness.      Comments: Right ankle with surgical dressings in place + wound vac    Left wrist - no signs of infection   Skin:     General: Skin is warm and dry.   Neurological:      Mental Status: He is alert and oriented to person, place, and time.   Psychiatric:         Mood and Affect: Mood normal.         Behavior: Behavior normal.          Significant Labs: Blood Culture:   Recent Labs   Lab 09/06/24  0235 09/07/24  0910 09/08/24  1641 09/10/24  1711   LABBLOO Gram stain aer bottle: Gram positive cocci in clusters resembling Staph  Gram stain alicia bottle: Gram positive cocci in clusters resembling Staph  Results called to and read back by:Luciana Altamirano RN 09/06/2024  20:52  STAPHYLOCOCCUS AUREUS*  Gram stain aer bottle: Gram positive cocci in clusters resembling Staph  Gram stain alicia bottle: Gram positive cocci in clusters resembling Staph  Results called to and read back by:Luciana Altamirano RN 09/06/2024  20:55  STAPHYLOCOCCUS AUREUS  For susceptibility see order #[id number]  * Gram stain aer bottle: Gram positive cocci in clusters resembling Staph  Results called to and read back by: Roseanne Guevara RN 09/08/2024  14:07  Gram stain alicia bottle: Gram positive cocci in clusters resembling Staph  Positive results previously called 09/08/2024  STAPHYLOCOCCUS AUREUS  ID consult required at [facility].  For susceptibility see order #[id number]  *  Gram stain aer bottle: Gram positive cocci in clusters resembling Staph  Gram stain alicia bottle: Gram positive cocci in clusters resembling Staph  Results called to and read back by:Luciana Altamirano LPN 09/08/2024  03:59  STAPHYLOCOCCUS AUREUS  ID consult required at [facility] and  [facility].  For susceptibility see order #[id number]  * No growth after 5 days.  No growth after 5 days. No growth after 5 days.     CBC:   Recent Labs   Lab 09/15/24  0241 09/16/24  0510   WBC 15.08* 14.14*   HGB 8.6* 8.4*   HCT 25.2* 25.7*   * 694*     CMP:   Recent Labs   Lab 09/15/24  0241 09/16/24  0510   * 131*   K 4.4 4.1    104   CO2 16* 18*   * 187*   BUN 43* 34*   CREATININE 2.7* 1.8*   CALCIUM 7.9* 8.1*   PROT 6.2 5.9*   ALBUMIN 1.3* 1.3*   BILITOT 0.4 0.4   ALKPHOS 361* 267*   AST 39 24   ALT 7* 6*   ANIONGAP 10 9     Microbiology Results (last 7 days)       Procedure Component Value Units Date/Time    Blood culture [[id number]] Collected: 09/10/24 1711    Order Status: Completed Specimen: Blood Updated: 09/15/24 2012     Blood Culture, Routine No growth after 5 days.    Urine culture [[id number]] Collected: 09/12/24 1543    Order Status: Completed Specimen: Urine Updated: 09/13/24 2309     Urine Culture, Routine No growth    Narrative:      add on Urine Creatinine and Urine Protein pe Hernandez NP/order#[id number] on 09/13/2024 @0025.    Specimen Source->Urine    Blood culture [[id number]] Collected: 09/08/24 1641    Order Status: Completed Specimen: Blood Updated: 09/13/24 2012     Blood Culture, Routine No growth after 5 days.    Narrative:      [id number]    Blood culture [[id number]] Collected: 09/08/24 1641    Order Status: Completed Specimen: Blood Updated: 09/13/24 2012     Blood Culture, Routine No growth after 5 days.    Narrative:      08271    Culture, Anaerobe [[id number]] Collected: 09/06/24 1446    Order Status: Completed Specimen: Incision site from Ankle, Right Updated: 09/12/24 1315     Anaerobic Culture No anaerobes isolated    Narrative:      Right ankle wound - culture    Blood culture x two cultures. Draw prior to antibiotics. [[id number]]  (Abnormal)  (Susceptibility) Collected: 09/06/24 0235    Order Status: Completed Specimen: Blood from  Peripheral, Antecubital, Left Updated: 09/11/24 1203     Blood Culture, Routine Gram stain aer bottle: Gram positive cocci in clusters resembling Staph      Gram stain alicia bottle: Gram positive cocci in clusters resembling Staph      Results called to and read back by:Luciana Altamirano RN 09/06/2024      20:52      STAPHYLOCOCCUS AUREUS    Narrative:      Aerobic and anaerobic    Blood culture [[id number]]  (Abnormal) Collected: 09/07/24 0910    Order Status: Completed Specimen: Blood Updated: 09/10/24 1000     Blood Culture, Routine Gram stain aer bottle: Gram positive cocci in clusters resembling Staph      Results called to and read back by: Roseanne Guevara RN 09/08/2024  14:07      Gram stain alicia bottle: Gram positive cocci in clusters resembling Staph      Positive results previously called 09/08/2024      STAPHYLOCOCCUS AUREUS  ID consult required at [facility].  For susceptibility see order #[id number]      Blood culture [[id number]]  (Abnormal) Collected: 09/07/24 0910    Order Status: Completed Specimen: Blood Updated: 09/10/24 0959     Blood Culture, Routine Gram stain aer bottle: Gram positive cocci in clusters resembling Staph      Gram stain alicia bottle: Gram positive cocci in clusters resembling Staph      Results called to and read back by:Luciana Altamirano LPN 09/08/2024      03:59      STAPHYLOCOCCUS AUREUS  ID consult required at [facility].  For susceptibility see order #[id number]            Wound Culture:   Recent Labs   Lab 09/06/24  1446   LABAERO STAPHYLOCOCCUS AUREUS  Many  *  STREPTOCOCCUS AGALACTIAE (GROUP B)  Many  Beta-hemolytic streptococci are routinely susceptible to   penicillins,cephalosporins and carbapenems.  *     All pertinent labs within the past 24 hours have been reviewed.    Significant Imaging: I have reviewed all pertinent imaging results/findings within the past 24 hours.    I have personally reviewed records /  hospital notes from   service and other specialty providers. I have also reviewed CBC, CMP/BMP,  cultures and imaging with my interpretation as documented in my assessment / plan.    Patient is high risk for infectious complications given pt's age, multiple co-morbidities, and case complexity.      Time: 50 minutes   50% of time spent on face-to-face counseling and coordination of care. Counseling included review of test results, diagnosis, and treatment plan with patient and/or family.

## 2024-09-16 NOTE — PROGRESS NOTES
[facility]  Endocrinology  Progress Note    Admit Date: 2024     Reason for Consult: Management of T2DM, Hyperglycemia      Surgical Procedure and Date: S/P irrigation debridement of RLE on 2024    Diabetes diagnosis year:      Home Diabetes Medications:    Humalog via OmniPod insulin pump  Mounjaro 10 mg weekly     Current pump settings:  Basal 12 am to 2.3 and 6 am to 1.55  ICR to 3 at 12 am, 2 at 4 pm  ISF to 1:20   AIT 3 hrs  Target 110-120        Patient had anaphylaxis reaction to SGLT2 inhibitors specifically Invokana     How often checking glucose at home? Dexcom G6   BG readings on regimen: Average 210's per Dexcom  Hypoglycemia on the regimen?  Yes  Missed doses on regimen?  No     Diabetes Complications include:     Hyperglycemia and Diabetic peripheral neuropathy         Complicating diabetes co morbidities:   HLD, HTN, Obesity         HPI: 64 y.o. male presents to the ED w/ complaint of altered mental status. Hx of R ankle fracture with surgery over a month ago. Patient now presents with sepsis 2/2 R ankle infection s/p ORIF on . Started on IV vanc and cefepime. Given IVFs. Blood cultures pending. Brought to OR with ortho on  for irrigation debridement of RLE. Endocrine following for BG and type 2 diabetes.     Lab Results   Component Value Date    LABA1C 10.8 (H) 08/15/2016    HGBA1C 8.5 (H) 2024         Interval HPI:   No acute events overnight. Patient in room 542/542 A. Blood glucose stable. BG at/above goal on current insulin regimen (SSI, basal insulin ). Steroid use- None. Renal function improving. BG elevating above goal.      Renal function-   Lab Results   Component Value Date    CREATININE 1.8 (H) 2024          Vasopressors-  None      Diet Clear liquid (no sugar)/Bariatric      Eatin%  Nausea: No  Hypoglycemia and intervention: No   Fever: No  TPN and/or TF: No    BP (!) 188/84 (Patient Position: Lying)   Pulse 94   Temp 97.9 °F (36.6 °C)  "(Oral)   Resp 18   Ht 5' 5" (1.651 m)   Wt 93 kg (205 lb 0.4 oz)   SpO2 98%   BMI 34.12 kg/m²     Labs Reviewed and Include    Recent Labs   Lab 09/16/24  0510   *   CALCIUM 8.1*   ALBUMIN 1.3*   PROT 5.9*   *   K 4.1   CO2 18*      BUN 34*   CREATININE 1.8*   ALKPHOS 267*   ALT 6*   AST 24   BILITOT 0.4     Lab Results   Component Value Date    WBC 14.14 (H) 09/16/2024    HGB 8.4 (L) 09/16/2024    HCT 25.7 (L) 09/16/2024    MCV 85 09/16/2024     (H) 09/16/2024     No results for input(s): "TSH", "FREET4" in the last 168 hours.  Lab Results   Component Value Date    HGBA1C 8.5 (H) 09/06/2024       Nutritional status:   Body mass index is 34.12 kg/m².  Lab Results   Component Value Date    ALBUMIN 1.3 (L) 09/16/2024    ALBUMIN 1.3 (L) 09/15/2024    ALBUMIN 1.2 (L) 09/14/2024     Lab Results   Component Value Date    PREALBUMIN 3 (L) 09/06/2024    PREALBUMIN 13 (L) 07/18/2024       Estimated Creatinine Clearance: 44 mL/min (A) (based on SCr of 1.8 mg/dL (H)).    Accu-Checks  Recent Labs     09/14/24  0720 09/14/24  1058 09/14/24  1616 09/14/24  2124 09/15/24  0723 09/15/24  1124 09/15/24  1532 09/16/24  0002 09/16/24  0516 09/16/24  0735   POCTGLUCOSE 153* 137* 129* 206* 152* 155* 224* 192* 198* 208*       Current Medications and/or Treatments Impacting Glycemic Control  Immunotherapy:    Immunosuppressants       None          Steroids:   Hormones (From admission, onward)      None          Pressors:    Autonomic Drugs (From admission, onward)      None          Hyperglycemia/Diabetes Medications:   Antihyperglycemics (From admission, onward)      Start     Stop Route Frequency Ordered    09/16/24 2100  insulin glargine U-100 (Lantus) pen 20 Units         -- SubQ Nightly 09/16/24 0830    09/13/24 0830  insulin aspart U-100 pen 0-10 Units         -- SubQ Every 4 hours PRN 09/13/24 0731            ASSESSMENT and PLAN    Renal/  Acute kidney injury (DEEPALI) with acute tubular necrosis " (ATN)  Titrate insulin slowly to avoid hypoglycemia as the risk of hypoglycemia increases with decreased creatinine clearance.  Estimated Creatinine Clearance: 44 mL/min (A) (based on SCr of 1.8 mg/dL (H)).        Endocrine  Uncontrolled diabetes mellitus with hyperglycemia  Endocrinology consulted for BG management.   BG goal 140-180    - Lantus (Insulin Glargine 20 units nightly (10% increase due to fasting BG above goal)   - [facility] SSI (150/25)  - BG checks q4hr  - Hypoglycemia protocol in place    ** Please notify Endocrine for any change and/or advance in diet**  ** Please call Endocrine for any BG related issues **    Discharge Planning:   TBD. Please notify endocrinology prior to discharge.    Addendum: Diet advanced to Diabetic Soft and Bite sized. Will initiate Novolog 3-6 units TIDWM given post-prandial BG excursions noted. (Basal/prandial matching) (range added due to inconsistent appetite)     Orthopedic  * Surgical wound breakdown  Optimize BG control to improve wound healing  Managed per primary team              Payton Vora PA-C  Endocrinology  [facility]

## 2024-09-16 NOTE — ASSESSMENT & PLAN NOTE
DEEPALI is likely due to pre-renal azotemia due to dehydration. Baseline creatinine is  1 . Most recent creatinine and eGFR are listed below.  Recent Labs     09/14/24  0337 09/15/24  0241 09/16/24  0510   CREATININE 3.3* 2.7* 1.8*   EGFRNORACEVR 20.2* 25.7* 41.8*        Plan  - DEEPALI is worsening  - Avoid nephrotoxins and renally dose meds for GFR listed above  - Monitor urine output, serial BMP, and adjust therapy as needed  - baseline 1.6--2.1--2.6 now. EF on prev echo 70% so signs of volume depletion, as well as hyponatremia and tachycardia. Resume IVF 9/12   Renal consulted, rec stop IVF 9/13, ATN on urine microscopy with casts, secondary to sepsis/contrast likely combination and cr not at plateau yet with Cr 3.3 on 9/14 and supportive care in interim.  Improving now at 2.7 on 9/15 with output picking up at 1.3 L.  And Cr now 1.9 and output 1.8L.

## 2024-09-16 NOTE — ASSESSMENT & PLAN NOTE
Titrate insulin slowly to avoid hypoglycemia as the risk of hypoglycemia increases with decreased creatinine clearance.  Estimated Creatinine Clearance: 44 mL/min (A) (based on SCr of 1.8 mg/dL (H)).

## 2024-09-16 NOTE — SUBJECTIVE & OBJECTIVE
Interval History: NAEO. Renal function continues to improve. Making good urine output.     Review of patient's allergies indicates:   Allergen Reactions    Invokana [canagliflozin] Anaphylaxis    Percocet [oxycodone-acetaminophen] Nausea Only and Hallucinations    Biaxin [clarithromycin]     Hydrocodone Other (See Comments)     Dizzy/nausea/hallucinations    Sulfa (sulfonamide antibiotics) Nausea Only and Rash     Current Facility-Administered Medications   Medication Frequency    acetaminophen tablet 500 mg Q8H PRN    albuterol-ipratropium 2.5 mg-0.5 mg/3 mL nebulizer solution 3 mL Q4H PRN    amLODIPine tablet 10 mg Daily    calcium carbonate 200 mg calcium (500 mg) chewable tablet 1,000 mg TID PRN    dextrose 10% bolus 125 mL 125 mL PRN    dextrose 10% bolus 125 mL 125 mL PRN    dextrose 10% bolus 250 mL 250 mL PRN    dextrose 10% bolus 250 mL 250 mL PRN    enoxaparin injection 30 mg Daily    glucagon (human recombinant) injection 1 mg PRN    glucose chewable tablet 16 g PRN    glucose chewable tablet 24 g PRN    guaiFENesin 12 hr tablet 600 mg BID    hydrALAZINE tablet 25 mg Q8H PRN    insulin aspart U-100 pen 0-10 Units Q4H PRN    insulin glargine U-100 (Lantus) pen 20 Units QHS    methocarbamoL tablet 500 mg TID PRN    morphine injection 2 mg Q6H PRN    morphine injection 2 mg Once    morphine injection 4 mg Q6H PRN    nortriptyline capsule 50 mg Nightly    ondansetron disintegrating tablet 8 mg Q8H PRN    oxacillin 12 g in  mL CONTINUOUS INFUSION Q24H    pantoprazole EC tablet 40 mg Daily    polyethylene glycol packet 17 g BID    senna tablet 8.6 mg BID    sodium bicarbonate tablet 1,300 mg TID    sucralfate 100 mg/mL suspension 1 g Q6H       Objective:     Vital Signs (Most Recent):  Temp: 98.1 °F (36.7 °C) (09/16/24 1144)  Pulse: 96 (09/16/24 1144)  Resp: 18 (09/16/24 1144)  BP: (!) 149/75 (09/16/24 1144)  SpO2: 96 % (09/16/24 1144) Vital Signs (24h Range):  Temp:  [97.2 °F (36.2 °C)-98.3 °F (36.8 °C)]  98.1 °F (36.7 °C)  Pulse:  [] 96  Resp:  [16-18] 18  SpO2:  [95 %-98 %] 96 %  BP: (141-188)/(57-84) 149/75     Weight: 93 kg (205 lb 0.4 oz) (09/06/24 2032)  Body mass index is 34.12 kg/m².  Body surface area is 2.07 meters squared.    I/O last 3 completed shifts:  In: 550 [P.O.:550]  Out: 2450 [Urine:2350; Other:100]     Physical Exam  Constitutional:       General: He is not in acute distress.     Appearance: Normal appearance. He is ill-appearing.      Interventions: Nasal cannula in place.   HENT:      Head: Normocephalic.      Right Ear: External ear normal.      Left Ear: External ear normal.   Eyes:      General: No scleral icterus.     Extraocular Movements: Extraocular movements intact.   Pulmonary:      Effort: Pulmonary effort is normal. No respiratory distress.   Abdominal:      General: Abdomen is flat. Bowel sounds are normal. There is no distension.      Palpations: Abdomen is soft.      Tenderness: There is no abdominal tenderness.   Musculoskeletal:         General: Swelling present.      Left lower leg: No edema.      Comments: RLE wrapped in bandages. C/D/I. Wound vac in place   Skin:     General: Skin is warm.      Coloration: Skin is not jaundiced.      Findings: No erythema or rash.   Neurological:      General: No focal deficit present.      Mental Status: He is alert and oriented to person, place, and time. Mental status is at baseline.   Psychiatric:         Mood and Affect: Mood normal.         Behavior: Behavior normal.         Thought Content: Thought content normal.        Significant Labs:  All labs within the past 24 hours have been reviewed.     Significant Imaging:  Labs: Reviewed

## 2024-09-16 NOTE — SUBJECTIVE & OBJECTIVE
Interval History: he reports feeling okay so far this morning, having some ankle pain with movement but meds are helpful when given. Cr improving quickly once atn/mary peaked and now cr 1.9 from 2.7 yesterday with good urine output at 1.8 L. He reports he feels like he's urinating a lot into the delvalle and can feel the urinary strain when bearing down for a BM yesterday and told him can see higher urine output post atn so will watch lytes if is having some post atn diuresis now. Plan for delvalle removal this week once cr stable for a day or 2. Wbc 14, inc norvasc to 10 today as higher BPs overnight. Ortho updated on improved cr who is going to touch base with vascular on angiogram planning given imprving. Talked to dr velez yesterday about his case as may do some medial surgery from ortho end pending if can get a flap safetly which will depend on vascular status.        Review of Systems   Constitutional:  Positive for activity change and fatigue. Negative for chills and fever.   Respiratory:  Positive for cough. Negative for chest tightness and shortness of breath.    Cardiovascular:  Negative for chest pain and leg swelling.   Gastrointestinal:  Positive for nausea. Negative for abdominal pain.   Musculoskeletal:  Positive for arthralgias.   Skin:  Positive for color change and wound.   Neurological:  Negative for dizziness and weakness.     Objective:     Vital Signs (Most Recent):  Temp: 98.1 °F (36.7 °C) (09/16/24 1144)  Pulse: 96 (09/16/24 1144)  Resp: 18 (09/16/24 1144)  BP: (!) 149/75 (09/16/24 1144)  SpO2: 96 % (09/16/24 1144) Vital Signs (24h Range):  Temp:  [97.2 °F (36.2 °C)-98.3 °F (36.8 °C)] 98.1 °F (36.7 °C)  Pulse:  [] 96  Resp:  [16-18] 18  SpO2:  [95 %-98 %] 96 %  BP: (141-188)/(57-84) 149/75     Weight: 93 kg (205 lb 0.4 oz)  Body mass index is 34.12 kg/m².    Intake/Output Summary (Last 24 hours) at 9/16/2024 1150  Last data filed at 9/16/2024 0457  Gross per 24 hour   Intake 550 ml   Output  1800 ml   Net -1250 ml         Physical Exam  Vitals and nursing note reviewed.   Constitutional:       Appearance: He is well-developed. He is obese. He is ill-appearing.      Comments: At baseline mental status without return of confusion. On oxygen   Eyes:      Pupils: Pupils are equal, round, and reactive to light.   Cardiovascular:      Rate and Rhythm: Regular rhythm. Tachycardia present.      Comments: HR 90s  Pulmonary:      Effort: Pulmonary effort is normal.      Breath sounds: Rales (bibasilar) present.      Comments: On NC. Speaking in full sentenecs without stopping for dyspnea. No crackles. No increased RR on exam. Breathing comfortably  Abdominal:      Palpations: Abdomen is soft.      Tenderness: There is no abdominal tenderness.   Musculoskeletal:         General: No tenderness.      Comments: Bandages in place. Wound vac with dark red output.   Skin:     General: Skin is warm and dry.      Comments: C/d/I dressing to R ankle with wound vac.    Neurological:      Mental Status: He is alert and oriented to person, place, and time.      Comments: Close to mental status baseline, much improved   Psychiatric:         Behavior: Behavior normal.             Significant Labs: All pertinent labs within the past 24 hours have been reviewed.  CBC:   Recent Labs   Lab 09/15/24  0241 09/16/24  0510   WBC 15.08* 14.14*   HGB 8.6* 8.4*   HCT 25.2* 25.7*   * 694*     CMP:   Recent Labs   Lab 09/15/24  0241 09/16/24  0510   * 131*   K 4.4 4.1    104   CO2 16* 18*   * 187*   BUN 43* 34*   CREATININE 2.7* 1.8*   CALCIUM 7.9* 8.1*   PROT 6.2 5.9*   ALBUMIN 1.3* 1.3*   BILITOT 0.4 0.4   ALKPHOS 361* 267*   AST 39 24   ALT 7* 6*   ANIONGAP 10 9       Significant Imaging: I have reviewed all pertinent imaging results/findings within the past 24 hours.

## 2024-09-16 NOTE — SUBJECTIVE & OBJECTIVE
Principal Problem:Surgical wound breakdown    Principal Orthopedic Problem: s/p I&D and wound vac placement on 09/06    Interval History: AF, HDS overnight. NAEON. Pain moderately well controlled. Reiterated importance of Abhijit. No other concerns this am. Cr continues to improve - will touch base with vascular about angio.       Review of patient's allergies indicates:   Allergen Reactions    Invokana [canagliflozin] Anaphylaxis    Percocet [oxycodone-acetaminophen] Nausea Only and Hallucinations    Biaxin [clarithromycin]     Hydrocodone Other (See Comments)     Dizzy/nausea/hallucinations    Sulfa (sulfonamide antibiotics) Nausea Only and Rash       Current Facility-Administered Medications   Medication    acetaminophen tablet 500 mg    albuterol-ipratropium 2.5 mg-0.5 mg/3 mL nebulizer solution 3 mL    amLODIPine tablet 10 mg    calcium carbonate 200 mg calcium (500 mg) chewable tablet 1,000 mg    dextrose 10% bolus 125 mL 125 mL    dextrose 10% bolus 125 mL 125 mL    dextrose 10% bolus 250 mL 250 mL    dextrose 10% bolus 250 mL 250 mL    enoxaparin injection 30 mg    glucagon (human recombinant) injection 1 mg    glucose chewable tablet 16 g    glucose chewable tablet 24 g    guaiFENesin 12 hr tablet 600 mg    hydrALAZINE tablet 25 mg    insulin aspart U-100 pen 0-10 Units    insulin glargine U-100 (Lantus) pen 20 Units    methocarbamoL tablet 500 mg    morphine injection 2 mg    morphine injection 2 mg    morphine injection 4 mg    nortriptyline capsule 50 mg    ondansetron disintegrating tablet 8 mg    oxacillin 12 g in  mL CONTINUOUS INFUSION    pantoprazole EC tablet 40 mg    polyethylene glycol packet 17 g    senna tablet 8.6 mg    sodium bicarbonate tablet 1,300 mg    sucralfate 100 mg/mL suspension 1 g     Objective:     Vital Signs (Most Recent):  Temp: 97.9 °F (36.6 °C) (09/16/24 0830)  Pulse: 94 (09/16/24 0830)  Resp: 18 (09/16/24 0830)  BP: (!) 188/84 (09/16/24 0830)  SpO2: 98 % (09/16/24 0830)  "Vital Signs (24h Range):  Temp:  [97.2 °F (36.2 °C)-98.3 °F (36.8 °C)] 97.9 °F (36.6 °C)  Pulse:  [] 94  Resp:  [16-18] 18  SpO2:  [95 %-98 %] 98 %  BP: (141-188)/(57-84) 188/84     Weight: 93 kg (205 lb 0.4 oz)  Height: 5' 5" (165.1 cm)  Body mass index is 34.12 kg/m².      Intake/Output Summary (Last 24 hours) at 9/16/2024 0950  Last data filed at 9/16/2024 0457  Gross per 24 hour   Intake 550 ml   Output 1900 ml   Net -1350 ml        Ortho/SPM Exam     AAOx4  NAD  Tachycardic  No increased WOB    RLE  Splint C/D/I   Wound vac to good suction  Compartments soft/compressible  Bastian   Active toe dorsiflexion & plantarflexion  WWP. Brisk cap refill      Significant Labs:   Recent Labs   Lab 09/16/24  0510   WBC 14.14*   RBC 3.04*   HGB 8.4*   HCT 25.7*   *   MCV 85   MCH 27.6   MCHC 32.7         Significant Imaging: I have reviewed and interpreted all pertinent imaging results/findings.  CTA RLE: Multifocal high-grade stenoses throughout the right calf arteries. No arterial occlusion.    CTA chest: No large central PE identified  "

## 2024-09-16 NOTE — PT/OT/SLP PROGRESS
"Speech Language Pathology Treatment    Patient Name:  Cortes Schroeder   MRN:  [id number]  Admitting Diagnosis: Surgical wound breakdown    Recommendations:                 General Recommendations:   ongoing swallowing assessment  Diet recommendations:  Soft & Bite Sized Diet - IDDSI Level 6, Liquid Diet Level: Thin liquids - IDDSI Level 0   Aspiration Precautions: 1 bite/sip at a time, Avoid talking while eating, Head turned to the left, HOB to 90 degrees, Monitor for s/s of aspiration, Remain upright 30 minutes post meal, Small bites/sips, and Strict aspiration precautions   General Precautions: Standard, aspiration, fall, dental soft  Communication strategies:  none    Assessment:     Cortes Schroeder is a 64 y.o. male with an SLP diagnosis of Dysphagia appearing to be primarily esophageal in nature(suspected esophagitis).    "I only ate about half of it." Pt was unable finish a turkey sandwich due to c/o related to likely esophagitis.     Pain/Comfort:  Pain Rating 1:  (did not report pain, but occasionally grimaced as if in pain)    Respiratory Status: Nasal cannula, flow 3 L/min    Objective:     Has the patient been evaluated by SLP for swallowing?   Yes  Keep patient NPO? No   Current Respiratory Status:        Pt was seen for follow up to monitor diet tolerance.  Diet advanced to soft and bite size by team due to pt's dislike for liquid diet.  Upon entry, pt noted to have a turkey sandwich which only a portion consumed. Pt endorsed epigastric discomfort and globus sensation with consumption of sandwich. Pt was agreeable to trials of sliced apples soften in cinnamon syrup. Pt tolerated multiple bites as well as multiple straw sips of water.  Oral and pharyngeal management was WFL. Pt did not c/o globus sensation or epigastric discomfort with consistencies received.  SLP recommends pt continue on current diet. SLP reached out to attending to confirm that pt was being treated for GERD and esophagitis.  SLP and MD " agree epigastric symptoms should resolve as the esophagits is being treated. SLP services to continue to monitor diet tolerance.     Goals:   Multidisciplinary Problems       SLP Goals          Problem: SLP    Goal Priority Disciplines Outcome   SLP Goal     SLP    Description: Speech Language Pathology Goals  Goals expected to be met by 9/27    1. Pt will tolerate least restrictive diet with no signs of airway comprise                              Plan:     Patient to be seen:  3 x/week   Plan of Care expires:  10/11/24  Plan of Care reviewed with:  patient   SLP Follow-Up:  Yes       Discharge recommendations:   (tbd)     Time Tracking:     SLP Treatment Date:   09/16/24  Speech Start Time:  1534  Speech Stop Time:  1545     Speech Total Time (min):  11 min    Billable Minutes: Treatment Swallowing Dysfunction 11    09/16/2024

## 2024-09-16 NOTE — PROGRESS NOTES
Nurses Note -- 4 Eyes      9/16/2024   7:14 AM      Skin assessed during: Q Shift Change      [x] No Altered Skin Integrity Present    []Prevention Measures Documented      [] Yes- Altered Skin Integrity Present or Discovered   [] LDA Added if Not in Epic (Describe Wound)   [] New Altered Skin Integrity was Present on Admit and Documented in LDA   [] Wound Image Taken    Wound Care Consulted? No    Attending Nurse:  elvia Hoffmann RN/Staff Member:   toñito

## 2024-09-16 NOTE — PROGRESS NOTES
[facility]  Nephrology  Progress Note    Patient Name: Cortes Schroeder  MRN: [id number]  Admission Date: 9/6/2024  Hospital Length of Stay: 10 days  Attending Provider: Maria Del Rosario Medina MD   Primary Care Physician: Andrew Sinclair Jr., MD  Principal Problem:Surgical wound breakdown    Subjective:     HPI: Mr. Schroeder is a 64yoM w/hx of T2DM, anxiety, GERD, HLD, HTN, previous syncopal episodes with recent ankle fracture and distal left radius fracture after a ground level fall s/p R ankle ex-fix (7/18) and L DR ORIF(7/19) who presented to the hospital for AMS. He was admitted to the hospital on 9/6 for sepsis secondary to surgical wound breakdown now s/p excisional debridement and irrigation (9/6) and and excisional and non-excisional debridement of tissue necrosis of the right ankle (9/9). His hospitalization has been complicated by staph aureus bacteremia and group B strep. Followed by ID and on IV Oxacillin, previously on Zosyn and Vancomycin.     BUN/Cr 37/3.0, up from his baseline Cr ~1.0. Labs are notable for WBC 18.63, H/H 7.9/23.6, , Na 131, Bicarb 16, , Serum osm 284, Urine Cr 129, Urine Osm 600, Urine Na 20, CK WNL. Nephrology was consulted for DEEPALI.     Interval History: NAEO. Renal function continues to improve. Making good urine output.     Review of patient's allergies indicates:   Allergen Reactions    Invokana [canagliflozin] Anaphylaxis    Percocet [oxycodone-acetaminophen] Nausea Only and Hallucinations    Biaxin [clarithromycin]     Hydrocodone Other (See Comments)     Dizzy/nausea/hallucinations    Sulfa (sulfonamide antibiotics) Nausea Only and Rash     Current Facility-Administered Medications   Medication Frequency    acetaminophen tablet 500 mg Q8H PRN    albuterol-ipratropium 2.5 mg-0.5 mg/3 mL nebulizer solution 3 mL Q4H PRN    amLODIPine tablet 10 mg Daily    calcium carbonate 200 mg calcium (500 mg) chewable tablet 1,000 mg TID PRN    dextrose 10% bolus 125 mL  125 mL PRN    dextrose 10% bolus 125 mL 125 mL PRN    dextrose 10% bolus 250 mL 250 mL PRN    dextrose 10% bolus 250 mL 250 mL PRN    enoxaparin injection 30 mg Daily    glucagon (human recombinant) injection 1 mg PRN    glucose chewable tablet 16 g PRN    glucose chewable tablet 24 g PRN    guaiFENesin 12 hr tablet 600 mg BID    hydrALAZINE tablet 25 mg Q8H PRN    insulin aspart U-100 pen 0-10 Units Q4H PRN    insulin glargine U-100 (Lantus) pen 20 Units QHS    methocarbamoL tablet 500 mg TID PRN    morphine injection 2 mg Q6H PRN    morphine injection 2 mg Once    morphine injection 4 mg Q6H PRN    nortriptyline capsule 50 mg Nightly    ondansetron disintegrating tablet 8 mg Q8H PRN    oxacillin 12 g in  mL CONTINUOUS INFUSION Q24H    pantoprazole EC tablet 40 mg Daily    polyethylene glycol packet 17 g BID    senna tablet 8.6 mg BID    sodium bicarbonate tablet 1,300 mg TID    sucralfate 100 mg/mL suspension 1 g Q6H       Objective:     Vital Signs (Most Recent):  Temp: 98.1 °F (36.7 °C) (09/16/24 1144)  Pulse: 96 (09/16/24 1144)  Resp: 18 (09/16/24 1144)  BP: (!) 149/75 (09/16/24 1144)  SpO2: 96 % (09/16/24 1144) Vital Signs (24h Range):  Temp:  [97.2 °F (36.2 °C)-98.3 °F (36.8 °C)] 98.1 °F (36.7 °C)  Pulse:  [] 96  Resp:  [16-18] 18  SpO2:  [95 %-98 %] 96 %  BP: (141-188)/(57-84) 149/75     Weight: 93 kg (205 lb 0.4 oz) (09/06/24 2032)  Body mass index is 34.12 kg/m².  Body surface area is 2.07 meters squared.    I/O last 3 completed shifts:  In: 550 [P.O.:550]  Out: 2450 [Urine:2350; Other:100]     Physical Exam  Constitutional:       General: He is not in acute distress.     Appearance: Normal appearance. He is ill-appearing.      Interventions: Nasal cannula in place.   HENT:      Head: Normocephalic.      Right Ear: External ear normal.      Left Ear: External ear normal.   Eyes:      General: No scleral icterus.     Extraocular Movements: Extraocular movements intact.   Pulmonary:      Effort:  Pulmonary effort is normal. No respiratory distress.   Abdominal:      General: Abdomen is flat. Bowel sounds are normal. There is no distension.      Palpations: Abdomen is soft.      Tenderness: There is no abdominal tenderness.   Musculoskeletal:         General: Swelling present.      Left lower leg: No edema.      Comments: RLE wrapped in bandages. C/D/I. Wound vac in place   Skin:     General: Skin is warm.      Coloration: Skin is not jaundiced.      Findings: No erythema or rash.   Neurological:      General: No focal deficit present.      Mental Status: He is alert and oriented to person, place, and time. Mental status is at baseline.   Psychiatric:         Mood and Affect: Mood normal.         Behavior: Behavior normal.         Thought Content: Thought content normal.        Significant Labs:  All labs within the past 24 hours have been reviewed.     Significant Imaging:  Labs: Reviewed  Assessment/Plan:     Renal/  Acute kidney injury (DEEPALI) with acute tubular necrosis (ATN)  Baseline Cr 1.0-1.2, Cr 1.7 on admission  DDx: Pre-renal 2/2 IVVD d/t decreased PO intake vs Contrast-induced nephropathy vs ATN in the setting of sepsis    Plan:  - Renal function improving  - Continue BP optimization.   - Retroperitoneal U/S not concerning for hydronephrosis  - Trend Cr/ Serial BMPs  - Strict I&Os, close monitoring of UOP  - Replace electrolytes PRN, goal K/Phos/Mg 4/3/2  - Avoid nephrotoxic agents when feasible (NSAIDs, ACEi/ARB, IV radiocontrast, gadolinium, etc.)  - Avoid Fleet's and Brown Bomb Enemas given their propensity to induce hypermagnesemia  - Renally dose all meds to eGFR  - Goal MAP > 65 mmHg  - No acute indications for RRT at this time         Thank you for your consult.  We will follow along peripherally. Please do not hesitate to call or reach out if you have any questions.     Dayami Montgomery MD  Nephrology  [facility] - Surgery

## 2024-09-16 NOTE — ASSESSMENT & PLAN NOTE
Patient with Hypoxic Respiratory failure which is Acute.  he is not on home oxygen. Supplemental oxygen was provided and noted-      .   Signs/symptoms of respiratory failure include- tachypnea and lethargy. Contributing diagnoses includes - Obesity Hypoventilation and Pneumonia Labs and images were reviewed. Patient Has not had a recent ABG. Will treat underlying causes and adjust management of respiratory failure as follows-     - ABG on 9/12 without major abnormality, P02 [id number], [id number], continue oxygen now, CT chest showing tracheomalachia/hyperinflation lungs. Has no hx of excessive intubation or lung dx known to him. Using IS on exam 9/13 and speaking in full sentences on exam, with small amount of clear/white sputum. No obvious signs of volume overload, some cough on exam and keep on liquid diet per speech recs. Stop doxy on 9/14 as no signs of PNA on exam and could be contributor to some of dysphagia per ID. Wean oxygen as tolerates, sputum resolved per patient. Breathing comfortable on exam. On 2L now on 9/15 and sats high 90s      - CTA ordered by ortho/anesthesia prior to I&D. Negative for PE, but small ground glass area to RUL representing infectious vs. Inflammatory process.  - doxy added for pneumonia coverage.  - mucinex BID, acapella, and nebulized saline . Has IS at bedside on exam 9/12  - prn duo nebs   9/8: no signs of overload. Left Ventricle: The left ventricle is normal in size. Normal wall thickness. There is normal systolic function with a visually estimated ejection fraction of 65 - 70%. There is normal diastolic function.    Right Ventricle: Normal right ventricular cavity size. Wall thickness is normal. Systolic function is normal.    IVC/SVC: Normal venous pressure at 3 mmHg.    No evidence of intracardiac mass or vegetation.

## 2024-09-16 NOTE — PROGRESS NOTES
[facility] Medicine  Progress Note    Patient Name: Cortes Schroeder  MRN: [id number]  Patient Class: IP- Inpatient   Admission Date: 9/6/2024  Length of Stay: 10 days  Attending Physician: Maria Del Rosario Medina MD  Primary Care Provider: Andrew Sinclair Jr., MD        Subjective:     Principal Problem:Surgical wound breakdown        HPI:  Cortes Schroeder is a 64 y.o. M with PMHx of anxiety, T2DM, GERD, HLD, HTN who presented to ED for AMS. He had a fall in July that resulted in R trimalleolar fracture and L distal radius fracture. He had external fixation on 07/18 and then ORIF on 07/26 with Dr. Liz. He was prescribed clinda 300 mg on 08/28 for a ten day course. Patient was doing well when he acutely became altered and not acting like himself a few hours ago. Patient family member drove him here from [facility] for ortho consult. R medial ankle wound dehisced with redness and swelling around it. No CPM fever, cough, N/V/D or seizure.    In ED: T max 99.1. SIRS 2/4 with WBC 18 and . CMP notable for Na 127, Cr 1.7, . Phos 1.9. .3. BNP 73. Trop neg. A1c 8.5. R tib fib XR notes There are 2 abandoned anterior-posteriorly oriented pin tracks in the right mid tibial shaft.  On AP views, each of these pin tracks demonstrates a small faint internal density, possibly representing a sequestrum. Ortho and endocrine consulted. Given IV vanc and IV clindamycin. Given 2.7L IVFs. Admitted to hospital medicine for sepsis 2/2 infected hardware.     Overview/Hospital Course:  Cortes Schroeder is a 65 yo M admitted to hospital medicine for sepsis 2/2 R ankle infection s/p ORIF on 07/26. Started on IV vanc and cefepime. Given IVFs. Brought to OR with ortho on 09/06 for irrigation debridement of RLE. Endocrine following for BG. Was on vanc and zosyn. Echo without concern for vegetations. 2/2 Blood cultures and 2/2 repeats with Staph aureus. Wound cultures with Staph aureus and Group B  strep. Will follow cultures. ID consulted as suspect patient will need long course of abx; now changing to continuous oxacillin. CTA chest negative for PE, but notes small area of ground glass changes to RUL. Patient now with cough. Adding doxy for possible PNA. Ortho repeated I&D on 09/09. WBC remains elevated, but fever has resolved. Plastics consulted for flap eval. CTA RLE with moderate atherosclerosis and multifocal high grade stenosis throughout R calf arteries. Vascular surgery consulted per plastics recs as they would like to pre-optimiize blood flow prior to any free flap or pedicled propellar flap. RD consulted for malnutrition. Now with DEEPALI, but likely 2/2 infection and multiple images requiring contrast. Will need PT/OT consult prior to discharge.     Interval History: he reports feeling okay so far this morning, having some ankle pain with movement but meds are helpful when given. Cr improving quickly once atn/deepali peaked and now cr 1.9 from 2.7 yesterday with good urine output at 1.8 L. He reports he feels like he's urinating a lot into the delvalle and can feel the urinary strain when bearing down for a BM yesterday and told him can see higher urine output post atn so will watch lytes if is having some post atn diuresis now. Plan for delvalle removal this week once cr stable for a day or 2. Wbc 14, inc norvasc to 10 today as higher BPs overnight. Ortho updated on improved cr who is going to touch base with vascular on angiogram planning given imprving. Talked to dr velez yesterday about his case as may do some medial surgery from ortho end pending if can get a flap safetly which will depend on vascular status.        Review of Systems   Constitutional:  Positive for activity change and fatigue. Negative for chills and fever.   Respiratory:  Positive for cough. Negative for chest tightness and shortness of breath.    Cardiovascular:  Negative for chest pain and leg swelling.   Gastrointestinal:  Positive  for nausea. Negative for abdominal pain.   Musculoskeletal:  Positive for arthralgias.   Skin:  Positive for color change and wound.   Neurological:  Negative for dizziness and weakness.     Objective:     Vital Signs (Most Recent):  Temp: 98.1 °F (36.7 °C) (09/16/24 1144)  Pulse: 96 (09/16/24 1144)  Resp: 18 (09/16/24 1144)  BP: (!) 149/75 (09/16/24 1144)  SpO2: 96 % (09/16/24 1144) Vital Signs (24h Range):  Temp:  [97.2 °F (36.2 °C)-98.3 °F (36.8 °C)] 98.1 °F (36.7 °C)  Pulse:  [] 96  Resp:  [16-18] 18  SpO2:  [95 %-98 %] 96 %  BP: (141-188)/(57-84) 149/75     Weight: 93 kg (205 lb 0.4 oz)  Body mass index is 34.12 kg/m².    Intake/Output Summary (Last 24 hours) at 9/16/2024 1150  Last data filed at 9/16/2024 0457  Gross per 24 hour   Intake 550 ml   Output 1800 ml   Net -1250 ml         Physical Exam  Vitals and nursing note reviewed.   Constitutional:       Appearance: He is well-developed. He is obese. He is ill-appearing.      Comments: At baseline mental status without return of confusion. On oxygen   Eyes:      Pupils: Pupils are equal, round, and reactive to light.   Cardiovascular:      Rate and Rhythm: Regular rhythm. Tachycardia present.      Comments: HR 90s  Pulmonary:      Effort: Pulmonary effort is normal.      Breath sounds: Rales (bibasilar) present.      Comments: On NC. Speaking in full sentenecs without stopping for dyspnea. No crackles. No increased RR on exam. Breathing comfortably  Abdominal:      Palpations: Abdomen is soft.      Tenderness: There is no abdominal tenderness.   Musculoskeletal:         General: No tenderness.      Comments: Bandages in place. Wound vac with dark red output.   Skin:     General: Skin is warm and dry.      Comments: C/d/I dressing to R ankle with wound vac.    Neurological:      Mental Status: He is alert and oriented to person, place, and time.      Comments: Close to mental status baseline, much improved   Psychiatric:         Behavior: Behavior  normal.             Significant Labs: All pertinent labs within the past 24 hours have been reviewed.  CBC:   Recent Labs   Lab 09/15/24  0241 09/16/24  0510   WBC 15.08* 14.14*   HGB 8.6* 8.4*   HCT 25.2* 25.7*   * 694*     CMP:   Recent Labs   Lab 09/15/24  0241 09/16/24  0510   * 131*   K 4.4 4.1    104   CO2 16* 18*   * 187*   BUN 43* 34*   CREATININE 2.7* 1.8*   CALCIUM 7.9* 8.1*   PROT 6.2 5.9*   ALBUMIN 1.3* 1.3*   BILITOT 0.4 0.4   ALKPHOS 361* 267*   AST 39 24   ALT 7* 6*   ANIONGAP 10 9       Significant Imaging: I have reviewed all pertinent imaging results/findings within the past 24 hours.    Assessment/Plan:      * Surgical wound breakdown  Closed trimalleolar fracture of right ankle s/p ORIF in July  65 yo M presenting with AMS and worsening redness, swelling and pain to R ankle.     - continue oxacillin  - Ortho consulted:   - taken to OR 09/06 for debridement of surgical wound with wound vac placed. CX with group B strep and staph.   - Repeat I&D of R medial ankle on 09/09   - recommending plastics eval for free flap  Wound vac in place  - follow wound cultures -- Staph aureus and GBS  - follow blood cultures -- Staph aureus.   - ID following:  - Continue IV oxacillin 12 g q 24 hours continuous infusion.  Monitor liver enzymes.   - Anticipate 6 weeks of IV antibiotics from date of most recent washout.    - Once blood cxs remain cleared for 72hrs, will consider adding adjunctive rifampin (isolate is susceptible) rifampin for biofilm penetration if hardware is to remain.  - Anticipate long term oral  antibiotic suppression following IV abx   - Will follow.    - Plastic consulted:   - CTA reviewed, given numerous areas of high grade stenosis, will need Vascular consult for possible angiogram to pre-optimiize blood flow prior to any free flap or pedicled propellar flap. Angiogram on hold for DEEPALI  - continue optimization of BG, endocrinology on board  - ID on board, continue  IV  abx. Current wbc remains elevated but starting to improve 9/13  - orthopedics on board to assess for source control, possible hardware removal.  - malnutition: will need optimization prior to OR. Prealbumin and transferrin low on last check.   - Plan: after medically optimized, plan for flap closure of wound.   - Vascular surgery consulted; appreciate recs . Angiogram aborted 9/12 for mental status as well as 9/13 for DEEPALI. Will replan once Cr improves.    Hyperphosphatemia  Binder started 9/14 as elevated from DEEPALI and stopped 9/16 as deepali better as is phos      Dysphagia  Coughing on exam witnesseed 9/13 with water and some secretions in airway on CT, speech eval placed and rec continue liquids at this time  Protnoix started as some reflux may be contributing, and doxy stopped 9/14 in case contributing also. Still some scratchinses, using spray to throat on 9/15, speech to reeval 9/16 on Monday  Requested soft diet as hates the broth, upgraded 9/16 and will see if speech today agrees       Constipation  Had issue last admit with prolonged constipation requiring enemas  -on bowel regimen  -has some bowel gas seen throughout CT abdomen, will giev suppository 9/13 to stay ahead and BM reported and 2 BMs 9/14      Bacteriuria  Seen on UA after delvalle for retention, per ID hold on teratment now as unclear if symptoms, reports 1 day of urine burning this week  -will f/u cx and has no growth      Airway malacia  Seen on CT, unclear cause as no hx of prologned intubation, etc.  -continu supportiv care, nebs, IS, humidified oxygen      Acute retention of urine  Required delvalle 9/12 for retenetion, per nursing had had issues on/off the last few adys and had immediate return of >300 cc once placed and very concentrated dark urine. Urien with sediment now and darker yellow on exam 9/13. Questionable infectious process with bacerueia, hold treatment for now per ID and monitor. DEEPALI, unclear cause if from retention, renal consulted,  will f/u trends  Plan for voiding trial later in week once cr improves consistently      Acute metabolic encephalopathy  Likely from meds versed, fentanyl for anioggram that was aborted on 9/12 due to change in mental status with poor clearance with DEEPALI as change in metnal status drastically after this was given 9/12, CT head wnl, ammonia/lactate, ck okay  Much improving mental status 9/13. Watch closely      Leukocytosis  Starting to improve 9/13 and now 15 on 9/15--> now 14      Sinus tachycardia  Suspect from volume down, IVF with improvement from 110s to 90s on 9/13      PAD (peripheral artery disease)  Workup with vascular now, THEO, high grade stenosis on CTA, possible aniogram pending cr improvement      Acute blood loss anemia  Anemia is likely due to acute blood loss which was from surgery . Most recent hemoglobin and hematocrit are listed below.  Recent Labs     09/10/24  0439 09/11/24  1041 09/12/24  0537   HGB 8.4* 9.0* 8.5*   HCT 25.9* 27.1* 26.0*     Plan  - Monitor serial CBC: Daily  - Transfuse PRBC if patient becomes hemodynamically unstable, symptomatic or H/H drops below 7/21.  - Patient has not received any PRBC transfusions to date  - Patient's anemia is currently stable  -     Cough  Covering doxy for URI, on mucinex, mostly dry but now with some scant white/clear sputum on 9./13 and sputum resolved now and doxy stopped 9/;14 per ID  Mucinex stopped 9/16 as he reports making him nauseated, changed back to pill form      Transaminitis  - liver enzymes up and down, highest ast at 155, between   Now at 96-80---49 and now normalized  - will hold truvada for now  - monitor with daily CMP    On pre-exposure prophylaxis for HIV  - holding truvada for transaminitis     MSSA bacteremia  - 2/2 blood cx with Staph aureus. 2/2 repeats positive. Repeats pending but NGTD  - Follow repeat blood cx  - ID following: start continuous oxacillin   - echo without concern for vegetations     Sepsis  This  patient does have evidence of infective focus  My overall impression is sepsis.  Source: Skin and Soft Tissue (location ankle)  Antibiotics given-   Antibiotics (72h ago, onward)      Start     Stop Route Frequency Ordered    09/09/24 1730  oxacillin 12 g in  mL CONTINUOUS INFUSION         -- IV Every 24 hours (non-standard times) 09/09/24 1630          Latest lactate reviewed-  Recent Labs   Lab 09/12/24  1618 09/12/24  2216   LACTATE 0.6  --    POCLAC  --  <0.30*       Organ dysfunction indicated by Acute kidney injury and Encephalopathy    Fluid challenge Actual Body weight- Patient will receive 30ml/kg actual body weight to calculate fluid bolus for treatment of septic shock.     Post- resuscitation assessment No - Post resuscitation assessment not needed     Will Not start Pressors-   Source control achieved by: see above  -secondary to surgical wound infeection in right ankle, with 9/6 wound Cx with group B strep and staph with bacteremia with 9/7 and 9/6  blood with staph with 9/8 and 9/10 blood Cx NGTD  ID following. Echo without signs of vegtations. On oxacillin now. Wbc still higher at 22, and ID following, procal 2 on 9/12-->1 on 9/13. CRp improved to 235. Wbc starting to improve at 18 now. .doxy stopped given no PNA signs on CT. Reached out to ortho on 9/14 regarding ankle as no obvious other source of elevated wbc per discussion with ID to see if any plans to reassess wound under vac, last op note had necrosis and no obvious purulence. Wbc improving 9/15 at 15 now    Hyponatremia  Hyponatremia is likely due to Dehydration/hypovolemia. The patient's most recent sodium results are listed below.  Recent Labs     09/11/24  1041 09/12/24  0538 09/13/24  0244   * 131* 131*       Plan  - Correct the sodium by 4-6mEq in 24 hours.   - Obtain the following studies: Urine sodium, urine osmolality, serum osmolality.  - Will treat the hyponatremia with IV fluids as follows: NS  - Monitor sodium Daily.    - Patient hyponatremia is similar to prev day, osm low normal ranges, urine osm pending  Last admit had similar low and improved with volume    Uncontrolled diabetes mellitus with hyperglycemia  Patient's FSGs are not controlled on current hypoglycemics.   Last A1c reviewed-   Lab Results   Component Value Date    LABA1C 10.8 (H) 08/15/2016    HGBA1C 8.5 (H) 09/06/2024     Most recent fingerstick glucose reviewed-   Recent Labs   Lab 09/12/24  2244 09/13/24  0452 09/13/24  0722 09/13/24  1059   POCTGLUCOSE 109 88 107 137*       Current correctional scale  Low  Maintain anti-hyperglycemic dose as follows-   Antihyperglycemics (From admission, onward)      Start     Stop Route Frequency Ordered    09/13/24 2100  insulin glargine U-100 (Lantus) pen 18 Units         -- SubQ Nightly 09/13/24 0730    09/13/24 0830  insulin aspart U-100 pen 0-10 Units         -- SubQ Every 4 hours PRN 09/13/24 0731           - Endocrine consulted:  - At this time, recommend that the patient remain off of his pump since he cannot be controlled in the Auto mode. Patient does not interact with pump frequently and relies on the auto mode algorithm.   - Lantus (Insulin Glargine) 35   - Novolog (Insulin Aspart) 9 units TIDWM (20% decrease given post-prandial BG below goal) (Hold if NPO) and prn for BG excursions MDC SSI (150/25)   - hold oral antihyperglycemics during hospitalization   - needs better BG control for optimal wound healing   Doses decreased on 9/12 for hypoglycemia this AM with endo managing and hypoglycemia again 9/13 in early AM overnight prior and endo adjusting further. On bariatric clear until speech eval    Closed trimalleolar fracture of right ankle  S/p ORIF 07/2024  Ortho consulted   - see surgical wound breakdown above  NWB    Acute kidney injury (DEEPALI) with acute tubular necrosis (ATN)  DEEPALI is likely due to pre-renal azotemia due to dehydration. Baseline creatinine is  1 . Most recent creatinine and eGFR are listed  below.  Recent Labs     09/14/24  0337 09/15/24  0241 09/16/24  0510   CREATININE 3.3* 2.7* 1.8*   EGFRNORACEVR 20.2* 25.7* 41.8*        Plan  - DEEPALI is worsening  - Avoid nephrotoxins and renally dose meds for GFR listed above  - Monitor urine output, serial BMP, and adjust therapy as needed  - baseline 1.6--2.1--2.6 now. EF on prev echo 70% so signs of volume depletion, as well as hyponatremia and tachycardia. Resume IVF 9/12   Renal consulted, rec stop IVF 9/13, ATN on urine microscopy with casts, secondary to sepsis/contrast likely combination and cr not at plateau yet with Cr 3.3 on 9/14 and supportive care in interim.  Improving now at 2.7 on 9/15 with output picking up at 1.3 L.  And Cr now 1.9 and output 1.8L.    Acute hypoxemic respiratory failure  Patient with Hypoxic Respiratory failure which is Acute.  he is not on home oxygen. Supplemental oxygen was provided and noted-      .   Signs/symptoms of respiratory failure include- tachypnea and lethargy. Contributing diagnoses includes - Obesity Hypoventilation and Pneumonia Labs and images were reviewed. Patient Has not had a recent ABG. Will treat underlying causes and adjust management of respiratory failure as follows-     - ABG on 9/12 without major abnormality, P02 89, [id number], continue oxygen now, CT chest showing tracheomalachia/hyperinflation lungs. Has no hx of excessive intubation or lung dx known to him. Using IS on exam 9/13 and speaking in full sentences on exam, with small amount of clear/white sputum. No obvious signs of volume overload, some cough on exam and keep on liquid diet per speech recs. Stop doxy on 9/14 as no signs of PNA on exam and could be contributor to some of dysphagia per ID. Wean oxygen as tolerates, sputum resolved per patient. Breathing comfortable on exam. On 2L now on 9/15 and sats high 90s      - CTA ordered by ortho/anesthesia prior to I&D. Negative for PE, but small ground glass area to RUL representing infectious vs.  "Inflammatory process.  - doxy added for pneumonia coverage.  - mucinex BID, acapella, and nebulized saline . Has IS at bedside on exam 9/12  - prn duo nebs   9/8: no signs of overload. Left Ventricle: The left ventricle is normal in size. Normal wall thickness. There is normal systolic function with a visually estimated ejection fraction of 65 - 70%. There is normal diastolic function.    Right Ventricle: Normal right ventricular cavity size. Wall thickness is normal. Systolic function is normal.    IVC/SVC: Normal venous pressure at 3 mmHg.    No evidence of intracardiac mass or vegetation.    Metabolic acidosis  Likely secondary to DEEPALI  Start Na bicarb on 9/12      Uncontrolled type 2 diabetes mellitus with diabetic polyneuropathy, with long-term current use of insulin  Patient's FSGs are uncontrolled due to hyperglycemia on current medication regimen.  Last A1c reviewed-   Lab Results   Component Value Date    LABA1C 10.8 (H) 08/15/2016    HGBA1C 9.9 (H) 07/18/2024     Most recent fingerstick glucose reviewed- No results for input(s): "POCTGLUCOSE" in the last 24 hours.  Current correctional scale  Low  Maintain anti-hyperglycemic dose as follows-   Antihyperglycemics (From admission, onward)      Start     Stop Route Frequency Ordered    09/06/24 0506  insulin aspart U-100 pen 0-5 Units         -- SubQ Every 6 hours PRN 09/06/24 0406          Hold Oral hypoglycemics while patient is in the hospital.      VTE Risk Mitigation (From admission, onward)           Ordered     enoxaparin injection 30 mg  Daily         09/13/24 0757     IP VTE HIGH RISK PATIENT  Once         09/06/24 1735                    Discharge Planning   JAYLEEN: 9/18/2024     Code Status: Full Code   Is the patient medically ready for discharge?:     Reason for patient still in hospital (select all that apply): Patient trending condition  Discharge Plan A: Home Health                  Maria Del Rosario Medina MD  Department of Hospital Medicine   [facility] - " Surgery

## 2024-09-16 NOTE — ASSESSMENT & PLAN NOTE
Baseline Cr 1.0-1.2, Cr 1.7 on admission  DDx: Pre-renal 2/2 IVVD d/t decreased PO intake vs Contrast-induced nephropathy vs ATN in the setting of sepsis    Plan:  - Renal function improving  - Continue BP optimization.   - Retroperitoneal U/S not concerning for hydronephrosis  - Trend Cr/ Serial BMPs  - Strict I&Os, close monitoring of UOP  - Replace electrolytes PRN, goal K/Phos/Mg 4/3/2  - Avoid nephrotoxic agents when feasible (NSAIDs, ACEi/ARB, IV radiocontrast, gadolinium, etc.)  - Avoid Fleet's and Brown Bomb Enemas given their propensity to induce hypermagnesemia  - Renally dose all meds to eGFR  - Goal MAP > 65 mmHg  - No acute indications for RRT at this time

## 2024-09-16 NOTE — ASSESSMENT & PLAN NOTE
Cortes Schroeder is a 64 y.o. male with PMH of DM, HTN  and right trimalleolar ankle fracture status post ex fix on 07/18 with subsequent definitive fixation on 07/26 admitted with right ankle wound dehiscence in the setting of uncontrolled diabetes.      He is s/p I&D of R ankle 09/06. Repeat I&D R medial ankle 09/09. Pending angiography with vascular once Cr improves.     Labs: DEEPALI improving, 1.8 from 2.7  Diet: clear liquid diet + jane  Pain control: multimodal regimen  PT/OT:  NWB RLE   DVT PPx: Lovenox 30 renally dosed   Abx:  oxacillin continuous; ID following   Cultures:  ankle cx staph +    Dispo: appreciate plastics and vascular recs, pending angiogram once Cr improves

## 2024-09-16 NOTE — PLAN OF CARE
Problem: Adult Inpatient Plan of Care  Goal: Absence of Hospital-Acquired Illness or Injury  Outcome: Progressing  Goal: Optimal Comfort and Wellbeing  Outcome: Progressing  Goal: Readiness for Transition of Care  Outcome: Progressing     Problem: Fall Injury Risk  Goal: Absence of Fall and Fall-Related Injury  Outcome: Progressing     Problem: Sepsis/Septic Shock  Goal: Optimal Coping  Outcome: Progressing  Goal: Absence of Bleeding  Outcome: Progressing  Goal: Blood Glucose Level Within Targeted Range  Outcome: Progressing  Goal: Absence of Infection Signs and Symptoms  Outcome: Progressing  Goal: Optimal Nutrition Intake  Outcome: Progressing     Problem: Acute Kidney Injury/Impairment  Goal: Fluid and Electrolyte Balance  Outcome: Progressing  Goal: Improved Oral Intake  Outcome: Progressing  Goal: Effective Renal Function  Outcome: Progressing     Problem: Wound  Goal: Optimal Coping  Outcome: Progressing  Goal: Optimal Functional Ability  Outcome: Progressing  Goal: Absence of Infection Signs and Symptoms  Outcome: Progressing  Goal: Improved Oral Intake  Outcome: Progressing  Goal: Optimal Pain Control and Function  Outcome: Progressing  Goal: Skin Health and Integrity  Outcome: Progressing  Goal: Optimal Wound Healing  Outcome: Progressing     Problem: Diabetes Comorbidity  Goal: Blood Glucose Level Within Targeted Range  Outcome: Progressing     Problem: Skin Injury Risk Increased  Goal: Skin Health and Integrity  Outcome: Progressing     Problem: Infection  Goal: Absence of Infection Signs and Symptoms  Outcome: Progressing   He has had a restful day.   Pain is well controled.   He has tolerated his meals well.   The bed is in the lowest position and the call light is within reach.

## 2024-09-16 NOTE — NURSING
Plan of Care Note    Dx: Right Ankle abcess    Shift Events: no significant events this shift.    Goals of Care: Wound care. Pain management. Wound Vac    Neuro: WNL    Vital Signs: Hypertensive. PRN meds available.    Respiratory: O2@3L currently.    Diet: Clear Liquids with sharmaine supplements     Is patient tolerating current diet? Yes    GTTS: IV antbx    Urine Output/Bowel Movement: Adequate UOP. Several   BM's this shift.    Drains/Tubes/Tube Feeds (include total output/shift): Atkinson catheter    Lines: PIV x 2      Accuchecks:Q 6 hours    Skin: Right ankle incision/wound vac    Fall Risk Score: 11    Activity level? Bedrest/ non weight bearing    Any scheduled procedures? none    Any safety concerns? Fall risk.     Other: none

## 2024-09-16 NOTE — PLAN OF CARE
Problem: Adult Inpatient Plan of Care  Goal: Plan of Care Review  Outcome: Met  Goal: Patient-Specific Goal (Individualized)  Outcome: Met     Problem: Fall Injury Risk  Goal: Absence of Fall and Fall-Related Injury  Outcome: Progressing  Intervention: Identify and Manage Contributors  Flowsheets (Taken 9/16/2024 0126)  Self-Care Promotion: independence encouraged  Medication Review/Management:   medications reviewed   dosing adjusted

## 2024-09-16 NOTE — ASSESSMENT & PLAN NOTE
I have reviewed hospital notes from   service and other specialty providers. I have also reviewed CBC, CMP/BMP,  cultures and imaging with my interpretation as documented.      64M with poorly controlled DM (A1C 8.5), recent fall resulting in left wrist and right ankle fractures 07/18/24 s/p ex fix Rt ankle and ORIF left wrist 07/19, with subsequent ORIF right ankle on 7/26/24 with Dr. Liz -- now c/b MSSA deep surgical site infection of right ankle involving underlying hardware with associated MSSA bacteremia.        S/p I&D on 09/06 and 9/9.  Op report noting purulence overlying hardware.  No plans for hardware removal at this time.  Unable to achieve primary closure and Plastic Surgery consulted.   A CTA of  lower extremity showed multifocal high grade stenosis throughout right calf, though no occlusion and Vascular Surgery consulted. Right TBI .41. Attempted angiogram 9/12, but developed extreme confusion/AMS and rapid respiratory rate  with versed/fentanyl and procedure postponed.   Further plans for angiogram still deferred for given DEEPALI (Nephrology following).   Plastics recommendations are pending Vascular workup.      Surgical cxs +GBS, MSSA.  Blood cultures 9/6 and 9/7 + for MSSA.  Repeat blood cultures 9/8, 9/11 NGTD.  2D echo negative.   Remains on IV oxacillin.        Afebrile.  WBC now downtrending - 14.  Creatinine 1.8 (downtrend). Liver enzymes normalized.    Recommendations / Plan:  Continue IV oxacillin 12 g q 24 hours continuous infusion.   Monitor creatinine clearance.  No dosage adjustment currently recommended, though neurotoxicity has been reported for cr cl <10.   To complete abx duration of 6wks s/p last I&D 09/09, DNO 10/21.   Ordered Hep B serologies before tentative discontinuation of Truvada, and addition of adjunctive rifampin for biofilm penetration of retained hardware   Follow up Vascular/Plastics/Orthpedic plans  So long as hardware remains, anticipate long term oral antibiotic  suppression following course of IV abx     -- Discussed with ID staff and primary team   -- ID will continue to follow w/ further recs.

## 2024-09-16 NOTE — PROGRESS NOTES
[facility] - [facility]  Orthopedics  Progress Note    Patient Name: Cortes Schroeder  MRN: [id number]  Admission Date: 9/6/2024  Hospital Length of Stay: 10 days  Attending Provider: Maria Del Rosario Medina MD  Primary Care Provider: Andrew Sinclair Jr., MD  Follow-up For: Procedure(s) (LRB):  Angiogram, Lower Arterial, Unilateral (Right)    Post-Operative Day: 4 Days Post-Op  Subjective:     Principal Problem:Surgical wound breakdown    Principal Orthopedic Problem: s/p I&D and wound vac placement on 09/06    Interval History: AF, HDS overnight. NAEON. Pain moderately well controlled. Reiterated importance of Abhijit. No other concerns this am. Cr continues to improve - will touch base with vascular about angio.       Review of patient's allergies indicates:   Allergen Reactions    Invokana [canagliflozin] Anaphylaxis    Percocet [oxycodone-acetaminophen] Nausea Only and Hallucinations    Biaxin [clarithromycin]     Hydrocodone Other (See Comments)     Dizzy/nausea/hallucinations    Sulfa (sulfonamide antibiotics) Nausea Only and Rash       Current Facility-Administered Medications   Medication    acetaminophen tablet 500 mg    albuterol-ipratropium 2.5 mg-0.5 mg/3 mL nebulizer solution 3 mL    amLODIPine tablet 10 mg    calcium carbonate 200 mg calcium (500 mg) chewable tablet 1,000 mg    dextrose 10% bolus 125 mL 125 mL    dextrose 10% bolus 125 mL 125 mL    dextrose 10% bolus 250 mL 250 mL    dextrose 10% bolus 250 mL 250 mL    enoxaparin injection 30 mg    glucagon (human recombinant) injection 1 mg    glucose chewable tablet 16 g    glucose chewable tablet 24 g    guaiFENesin 12 hr tablet 600 mg    hydrALAZINE tablet 25 mg    insulin aspart U-100 pen 0-10 Units    insulin glargine U-100 (Lantus) pen 20 Units    methocarbamoL tablet 500 mg    morphine injection 2 mg    morphine injection 2 mg    morphine injection 4 mg    nortriptyline capsule 50 mg    ondansetron disintegrating tablet 8 mg    oxacillin 12 g in   "mL CONTINUOUS INFUSION    pantoprazole EC tablet 40 mg    polyethylene glycol packet 17 g    senna tablet 8.6 mg    sodium bicarbonate tablet 1,300 mg    sucralfate 100 mg/mL suspension 1 g     Objective:     Vital Signs (Most Recent):  Temp: 97.9 °F (36.6 °C) (09/16/24 0830)  Pulse: 94 (09/16/24 0830)  Resp: 18 (09/16/24 0830)  BP: (!) 188/84 (09/16/24 0830)  SpO2: 98 % (09/16/24 0830) Vital Signs (24h Range):  Temp:  [97.2 °F (36.2 °C)-98.3 °F (36.8 °C)] 97.9 °F (36.6 °C)  Pulse:  [] 94  Resp:  [16-18] 18  SpO2:  [95 %-98 %] 98 %  BP: (141-188)/(57-84) 188/84     Weight: 93 kg (205 lb 0.4 oz)  Height: 5' 5" (165.1 cm)  Body mass index is 34.12 kg/m².      Intake/Output Summary (Last 24 hours) at 9/16/2024 0950  Last data filed at 9/16/2024 0457  Gross per 24 hour   Intake 550 ml   Output 1900 ml   Net -1350 ml        Ortho/SPM Exam     AAOx4  NAD  Tachycardic  No increased WOB    RLE  Splint C/D/I   Wound vac to good suction  Compartments soft/compressible  [place]   Active toe dorsiflexion & plantarflexion  WWP. Brisk cap refill      Significant Labs:   Recent Labs   Lab 09/16/24  0510   WBC 14.14*   RBC 3.04*   HGB 8.4*   HCT 25.7*   *   MCV 85   MCH 27.6   MCHC 32.7         Significant Imaging: I have reviewed and interpreted all pertinent imaging results/findings.  CTA RLE: Multifocal high-grade stenoses throughout the right calf arteries. No arterial occlusion.    CTA chest: No large central PE identified  Assessment/Plan:     * Surgical wound breakdown  Cortes Schroeder is a 64 y.o. male with PMH of DM, HTN  and right trimalleolar ankle fracture status post ex fix on 07/18 with subsequent definitive fixation on 07/26 admitted with right ankle wound dehiscence in the setting of uncontrolled diabetes.      He is s/p I&D of R ankle 09/06. Repeat I&D R medial ankle 09/09. Pending angiography with vascular once Cr improves.     Labs: DEEPALI improving, 1.8 from 2.7  Diet: clear liquid diet + jane  Pain " control: multimodal regimen  PT/OT:  NWB RLE   DVT PPx: Lovenox 30 renally dosed   Abx:  oxacillin continuous; ID following   Cultures:  ankle cx staph +    Dispo: appreciate plastics and vascular recs, pending angiogram once Cr improves                  Artur Galvan MD  Orthopedics  [facility] - Surgery

## 2024-09-17 ENCOUNTER — ANESTHESIA EVENT (OUTPATIENT)
Dept: SURGERY | Facility: HOSPITAL | Age: 64
End: 2024-09-17
Payer: COMMERCIAL

## 2024-09-17 ENCOUNTER — ANESTHESIA (OUTPATIENT)
Dept: SURGERY | Facility: HOSPITAL | Age: 64
End: 2024-09-17
Payer: COMMERCIAL

## 2024-09-17 LAB
ALBUMIN SERPL BCP-MCNC: 1.4 G/DL (ref 3.5–5.2)
ALP SERPL-CCNC: 243 U/L (ref 55–135)
ALT SERPL W/O P-5'-P-CCNC: 6 U/L (ref 10–44)
ANION GAP SERPL CALC-SCNC: 9 MMOL/L (ref 8–16)
AST SERPL-CCNC: 21 U/L (ref 10–40)
BILIRUB SERPL-MCNC: 0.4 MG/DL (ref 0.1–1)
BUN SERPL-MCNC: 28 MG/DL (ref 8–23)
CALCIUM SERPL-MCNC: 8.1 MG/DL (ref 8.7–10.5)
CHLORIDE SERPL-SCNC: 105 MMOL/L (ref 95–110)
CO2 SERPL-SCNC: 21 MMOL/L (ref 23–29)
CREAT SERPL-MCNC: 1.3 MG/DL (ref 0.5–1.4)
ERYTHROCYTE [DISTWIDTH] IN BLOOD BY AUTOMATED COUNT: 15.9 % (ref 11.5–14.5)
EST. GFR  (NO RACE VARIABLE): >60 ML/MIN/1.73 M^2
GLUCOSE SERPL-MCNC: 225 MG/DL (ref 70–110)
HCT VFR BLD AUTO: 26.1 % (ref 40–54)
HGB BLD-MCNC: 8.5 G/DL (ref 14–18)
MCH RBC QN AUTO: 28.1 PG (ref 27–31)
MCHC RBC AUTO-ENTMCNC: 32.6 G/DL (ref 32–36)
MCV RBC AUTO: 86 FL (ref 82–98)
PHOSPHATE SERPL-MCNC: 3.3 MG/DL (ref 2.7–4.5)
PLATELET # BLD AUTO: 726 K/UL (ref 150–450)
PMV BLD AUTO: 9.6 FL (ref 9.2–12.9)
POCT GLUCOSE: 177 MG/DL (ref 70–110)
POCT GLUCOSE: 223 MG/DL (ref 70–110)
POCT GLUCOSE: 227 MG/DL (ref 70–110)
POTASSIUM SERPL-SCNC: 4.2 MMOL/L (ref 3.5–5.1)
PROT SERPL-MCNC: 6.1 G/DL (ref 6–8.4)
RBC # BLD AUTO: 3.03 M/UL (ref 4.6–6.2)
SODIUM SERPL-SCNC: 135 MMOL/L (ref 136–145)
WBC # BLD AUTO: 14.74 K/UL (ref 3.9–12.7)

## 2024-09-17 PROCEDURE — 99499 UNLISTED E&M SERVICE: CPT | Mod: ,,, | Performed by: STUDENT IN AN ORGANIZED HEALTH CARE EDUCATION/TRAINING PROGRAM

## 2024-09-17 PROCEDURE — 25000003 PHARM REV CODE 250: Performed by: NURSE ANESTHETIST, CERTIFIED REGISTERED

## 2024-09-17 PROCEDURE — 37228 PR TIB/PER REVASC W/TLA: CPT | Mod: RT,,, | Performed by: SURGERY

## 2024-09-17 PROCEDURE — 36000706: Performed by: SURGERY

## 2024-09-17 PROCEDURE — 82962 GLUCOSE BLOOD TEST: CPT | Performed by: SURGERY

## 2024-09-17 PROCEDURE — 25000003 PHARM REV CODE 250: Performed by: SURGERY

## 2024-09-17 PROCEDURE — 94761 N-INVAS EAR/PLS OXIMETRY MLT: CPT

## 2024-09-17 PROCEDURE — C1725 CATH, TRANSLUMIN NON-LASER: HCPCS | Performed by: SURGERY

## 2024-09-17 PROCEDURE — 75710 ARTERY X-RAYS ARM/LEG: CPT | Mod: 26,59,, | Performed by: SURGERY

## 2024-09-17 PROCEDURE — 25500020 PHARM REV CODE 255: Performed by: SURGERY

## 2024-09-17 PROCEDURE — 36415 COLL VENOUS BLD VENIPUNCTURE: CPT | Performed by: HOSPITALIST

## 2024-09-17 PROCEDURE — 37000008 HC ANESTHESIA 1ST 15 MINUTES: Performed by: SURGERY

## 2024-09-17 PROCEDURE — C1894 INTRO/SHEATH, NON-LASER: HCPCS | Performed by: SURGERY

## 2024-09-17 PROCEDURE — 63600175 PHARM REV CODE 636 W HCPCS: Performed by: NURSE ANESTHETIST, CERTIFIED REGISTERED

## 2024-09-17 PROCEDURE — 71000033 HC RECOVERY, INTIAL HOUR: Performed by: SURGERY

## 2024-09-17 PROCEDURE — B41FZZZ FLUOROSCOPY OF RIGHT LOWER EXTREMITY ARTERIES: ICD-10-PCS | Performed by: SURGERY

## 2024-09-17 PROCEDURE — 63600175 PHARM REV CODE 636 W HCPCS: Performed by: STUDENT IN AN ORGANIZED HEALTH CARE EDUCATION/TRAINING PROGRAM

## 2024-09-17 PROCEDURE — C1760 CLOSURE DEV, VASC: HCPCS | Performed by: SURGERY

## 2024-09-17 PROCEDURE — 047P3ZZ DILATION OF RIGHT ANTERIOR TIBIAL ARTERY, PERCUTANEOUS APPROACH: ICD-10-PCS | Performed by: SURGERY

## 2024-09-17 PROCEDURE — 25000003 PHARM REV CODE 250

## 2024-09-17 PROCEDURE — 97605 NEG PRS WND THER DME<=50SQCM: CPT | Mod: ,,, | Performed by: ORTHOPAEDIC SURGERY

## 2024-09-17 PROCEDURE — 63600175 PHARM REV CODE 636 W HCPCS: Performed by: NURSE PRACTITIONER

## 2024-09-17 PROCEDURE — 80053 COMPREHEN METABOLIC PANEL: CPT | Performed by: HOSPITALIST

## 2024-09-17 PROCEDURE — 84100 ASSAY OF PHOSPHORUS: CPT | Performed by: HOSPITALIST

## 2024-09-17 PROCEDURE — 63600175 PHARM REV CODE 636 W HCPCS: Performed by: SURGERY

## 2024-09-17 PROCEDURE — 99900035 HC TECH TIME PER 15 MIN (STAT)

## 2024-09-17 PROCEDURE — 047R3ZZ DILATION OF RIGHT POSTERIOR TIBIAL ARTERY, PERCUTANEOUS APPROACH: ICD-10-PCS | Performed by: SURGERY

## 2024-09-17 PROCEDURE — 71000015 HC POSTOP RECOV 1ST HR: Performed by: SURGERY

## 2024-09-17 PROCEDURE — 25000003 PHARM REV CODE 250: Performed by: PHYSICIAN ASSISTANT

## 2024-09-17 PROCEDURE — 25000003 PHARM REV CODE 250: Performed by: INTERNAL MEDICINE

## 2024-09-17 PROCEDURE — 2W0SX6Z CHANGE PRESSURE DRESSING ON RIGHT FOOT: ICD-10-PCS | Performed by: ORTHOPAEDIC SURGERY

## 2024-09-17 PROCEDURE — 36000707: Performed by: SURGERY

## 2024-09-17 PROCEDURE — 37000009 HC ANESTHESIA EA ADD 15 MINS: Performed by: SURGERY

## 2024-09-17 PROCEDURE — C1887 CATHETER, GUIDING: HCPCS | Performed by: SURGERY

## 2024-09-17 PROCEDURE — 21400001 HC TELEMETRY ROOM

## 2024-09-17 PROCEDURE — 27201423 OPTIME MED/SURG SUP & DEVICES STERILE SUPPLY: Performed by: SURGERY

## 2024-09-17 PROCEDURE — 99232 SBSQ HOSP IP/OBS MODERATE 35: CPT | Mod: ,,, | Performed by: NURSE PRACTITIONER

## 2024-09-17 PROCEDURE — C1769 GUIDE WIRE: HCPCS | Performed by: SURGERY

## 2024-09-17 PROCEDURE — 85027 COMPLETE CBC AUTOMATED: CPT | Performed by: HOSPITALIST

## 2024-09-17 PROCEDURE — 25000003 PHARM REV CODE 250: Performed by: HOSPITALIST

## 2024-09-17 PROCEDURE — 37232 PR REVASCULARIZE TIBIAL/PERON ARTERY,ANGIOPLASTY EA ADD: CPT | Mod: RT,,, | Performed by: SURGERY

## 2024-09-17 PROCEDURE — 63600175 PHARM REV CODE 636 W HCPCS: Performed by: HOSPITALIST

## 2024-09-17 RX ORDER — INSULIN GLARGINE 100 [IU]/ML
25 INJECTION, SOLUTION SUBCUTANEOUS NIGHTLY
Status: DISCONTINUED | OUTPATIENT
Start: 2024-09-17 | End: 2024-09-18

## 2024-09-17 RX ORDER — FENTANYL CITRATE 50 UG/ML
INJECTION, SOLUTION INTRAMUSCULAR; INTRAVENOUS
Status: DISCONTINUED | OUTPATIENT
Start: 2024-09-17 | End: 2024-09-17

## 2024-09-17 RX ORDER — LABETALOL HYDROCHLORIDE 5 MG/ML
INJECTION, SOLUTION INTRAVENOUS
Status: DISCONTINUED | OUTPATIENT
Start: 2024-09-17 | End: 2024-09-17

## 2024-09-17 RX ORDER — HEPARIN SOD,PORCINE/0.9 % NACL 1000/500ML
INTRAVENOUS SOLUTION INTRAVENOUS
Status: DISCONTINUED | OUTPATIENT
Start: 2024-09-17 | End: 2024-09-17 | Stop reason: HOSPADM

## 2024-09-17 RX ORDER — ONDANSETRON HYDROCHLORIDE 2 MG/ML
4 INJECTION, SOLUTION INTRAVENOUS DAILY PRN
Status: DISCONTINUED | OUTPATIENT
Start: 2024-09-17 | End: 2024-09-17 | Stop reason: HOSPADM

## 2024-09-17 RX ORDER — MIDAZOLAM HYDROCHLORIDE 1 MG/ML
INJECTION INTRAMUSCULAR; INTRAVENOUS
Status: DISCONTINUED | OUTPATIENT
Start: 2024-09-17 | End: 2024-09-17

## 2024-09-17 RX ORDER — HEPARIN SODIUM 1000 [USP'U]/ML
INJECTION, SOLUTION INTRAVENOUS; SUBCUTANEOUS
Status: DISCONTINUED | OUTPATIENT
Start: 2024-09-17 | End: 2024-09-17

## 2024-09-17 RX ORDER — SODIUM CHLORIDE 9 MG/ML
INJECTION, SOLUTION INTRAVENOUS CONTINUOUS
Status: DISCONTINUED | OUTPATIENT
Start: 2024-09-17 | End: 2024-09-18

## 2024-09-17 RX ORDER — FENTANYL CITRATE 50 UG/ML
25 INJECTION, SOLUTION INTRAMUSCULAR; INTRAVENOUS EVERY 5 MIN PRN
Status: COMPLETED | OUTPATIENT
Start: 2024-09-17 | End: 2024-09-17

## 2024-09-17 RX ORDER — INSULIN ASPART 100 [IU]/ML
4-8 INJECTION, SOLUTION INTRAVENOUS; SUBCUTANEOUS
Status: DISCONTINUED | OUTPATIENT
Start: 2024-09-17 | End: 2024-09-20

## 2024-09-17 RX ORDER — PROPOFOL 10 MG/ML
VIAL (ML) INTRAVENOUS CONTINUOUS PRN
Status: DISCONTINUED | OUTPATIENT
Start: 2024-09-17 | End: 2024-09-17

## 2024-09-17 RX ORDER — LIDOCAINE HYDROCHLORIDE 10 MG/ML
INJECTION, SOLUTION EPIDURAL; INFILTRATION; INTRACAUDAL; PERINEURAL
Status: DISCONTINUED | OUTPATIENT
Start: 2024-09-17 | End: 2024-09-17 | Stop reason: HOSPADM

## 2024-09-17 RX ORDER — IODIXANOL 320 MG/ML
INJECTION, SOLUTION INTRAVASCULAR
Status: DISCONTINUED | OUTPATIENT
Start: 2024-09-17 | End: 2024-09-17 | Stop reason: HOSPADM

## 2024-09-17 RX ORDER — GLUCAGON 1 MG
1 KIT INJECTION
Status: DISCONTINUED | OUTPATIENT
Start: 2024-09-17 | End: 2024-09-17 | Stop reason: HOSPADM

## 2024-09-17 RX ORDER — SODIUM CHLORIDE 0.9 % (FLUSH) 0.9 %
10 SYRINGE (ML) INJECTION
Status: DISCONTINUED | OUTPATIENT
Start: 2024-09-17 | End: 2024-09-17 | Stop reason: HOSPADM

## 2024-09-17 RX ADMIN — PROPOFOL 50 MCG/KG/MIN: 10 INJECTION, EMULSION INTRAVENOUS at 12:09

## 2024-09-17 RX ADMIN — HEPARIN SODIUM 8000 UNITS: 1000 INJECTION, SOLUTION INTRAVENOUS; SUBCUTANEOUS at 11:09

## 2024-09-17 RX ADMIN — SUCRALFATE 1 G: 1 SUSPENSION ORAL at 05:09

## 2024-09-17 RX ADMIN — MIDAZOLAM HYDROCHLORIDE 1 MG: 2 INJECTION, SOLUTION INTRAMUSCULAR; INTRAVENOUS at 11:09

## 2024-09-17 RX ADMIN — FENTANYL CITRATE 25 MCG: 50 INJECTION, SOLUTION INTRAMUSCULAR; INTRAVENOUS at 12:09

## 2024-09-17 RX ADMIN — AMLODIPINE BESYLATE 10 MG: 10 TABLET ORAL at 08:09

## 2024-09-17 RX ADMIN — FENTANYL CITRATE 25 MCG: 50 INJECTION, SOLUTION INTRAMUSCULAR; INTRAVENOUS at 11:09

## 2024-09-17 RX ADMIN — SODIUM BICARBONATE 650 MG TABLET 1300 MG: at 08:09

## 2024-09-17 RX ADMIN — FENTANYL CITRATE 25 MCG: 50 INJECTION INTRAMUSCULAR; INTRAVENOUS at 02:09

## 2024-09-17 RX ADMIN — INSULIN ASPART 4 UNITS: 100 INJECTION, SOLUTION INTRAVENOUS; SUBCUTANEOUS at 08:09

## 2024-09-17 RX ADMIN — INSULIN ASPART 4 UNITS: 100 INJECTION, SOLUTION INTRAVENOUS; SUBCUTANEOUS at 05:09

## 2024-09-17 RX ADMIN — POLYETHYLENE GLYCOL 3350 17 G: 17 POWDER, FOR SOLUTION ORAL at 08:09

## 2024-09-17 RX ADMIN — ENOXAPARIN SODIUM 30 MG: 30 INJECTION SUBCUTANEOUS at 04:09

## 2024-09-17 RX ADMIN — GUAIFENESIN 600 MG: 600 TABLET, EXTENDED RELEASE ORAL at 08:09

## 2024-09-17 RX ADMIN — METHOCARBAMOL 500 MG: 500 TABLET ORAL at 08:09

## 2024-09-17 RX ADMIN — SODIUM CHLORIDE: 0.9 INJECTION, SOLUTION INTRAVENOUS at 11:09

## 2024-09-17 RX ADMIN — NORTRIPTYLINE HYDROCHLORIDE 50 MG: 25 CAPSULE ORAL at 08:09

## 2024-09-17 RX ADMIN — LABETALOL HYDROCHLORIDE 5 MG: 5 INJECTION, SOLUTION INTRAVENOUS at 11:09

## 2024-09-17 RX ADMIN — PANTOPRAZOLE SODIUM 40 MG: 40 TABLET, DELAYED RELEASE ORAL at 08:09

## 2024-09-17 RX ADMIN — HEPARIN SODIUM 1000 UNITS: 1000 INJECTION, SOLUTION INTRAVENOUS; SUBCUTANEOUS at 12:09

## 2024-09-17 RX ADMIN — SODIUM BICARBONATE 650 MG TABLET 1300 MG: at 04:09

## 2024-09-17 RX ADMIN — INSULIN GLARGINE 25 UNITS: 100 INJECTION, SOLUTION SUBCUTANEOUS at 09:09

## 2024-09-17 RX ADMIN — SENNOSIDES 8.6 MG: 8.6 TABLET, FILM COATED ORAL at 08:09

## 2024-09-17 NOTE — ASSESSMENT & PLAN NOTE
I have reviewed hospital notes from   service and other specialty providers. I have also reviewed CBC, CMP/BMP,  cultures and imaging with my interpretation as documented.      64M with poorly controlled DM (A1C 8.5), recent fall resulting in left wrist and right ankle fractures 07/18/24 s/p ex fix Rt ankle and ORIF left wrist 07/19, with subsequent ORIF right ankle on 7/26/24 with Dr. Liz -- now c/b MSSA deep surgical site infection of right ankle involving underlying hardware with associated MSSA bacteremia.        S/p I&D on 09/06 and 9/9.  Op report noting purulence overlying hardware.  No plans for hardware removal at this time.  Unable to achieve primary closure and Plastic Surgery consulted.   A CTA of  lower extremity showed multifocal high grade stenosis throughout right calf, though no occlusion and Vascular Surgery consulted. Right TBI .41. Attempted angiogram 9/12, but developed extreme confusion/AMS and rapid respiratory rate  with versed/fentanyl and procedure postponed.   Further plans for angiogram still deferred for given DEEPALI (Nephrology following).   Plastics recommendations are pending Vascular workup.      Surgical cxs +GBS, MSSA.  Blood cultures 9/6 and 9/7 + for MSSA.  Repeat blood cultures 9/8, 9/11 NGTD.  2D echo negative.   Remains on IV oxacillin.        Afebrile.  WBC now downtrending - 14.  Creatinine 1.8 (downtrend). Liver enzymes normalized. 09/17 underwent angio and wound vac exchange. Hopefully pt candidate for flap closure.    Recommendations / Plan:  Continue IV oxacillin 12 g q 24 hours continuous infusion.   Monitor creatinine clearance.  No dosage adjustment currently recommended, though neurotoxicity has been reported for cr cl <10.   To complete abx duration of 6wks s/p last I&D 09/09, DON 10/21.   Will f/u plastics recs for closure  Will hold off adding Rifampin until after tentative plastics flap/ closure,  to avoid further complications (intolerance, worsening DEEPLAI, or  potential DDI) or delays in surgical management.   So long as hardware remains, anticipate long term oral antibiotic suppression following course of IV abx     -- Discussed with ID staff and primary team   -- ID will continue to follow w/ further recs.

## 2024-09-17 NOTE — PROGRESS NOTES
[facility] ([facility])  Infectious Disease  Progress Note    Patient Name: Cortes Schroeder  MRN: [id number]  Admission Date: 9/6/2024  Length of Stay: 11 days  Attending Physician: Maria Del Rosario Medina MD  Primary Care Provider: Andrew Sinclair Jr., MD    Isolation Status: No active isolations  Assessment/Plan:      ID  Surgical site infection  I have reviewed hospital notes from   service and other specialty providers. I have also reviewed CBC, CMP/BMP,  cultures and imaging with my interpretation as documented.      64M with poorly controlled DM (A1C 8.5), recent fall resulting in left wrist and right ankle fractures 07/18/24 s/p ex fix Rt ankle and ORIF left wrist 07/19, with subsequent ORIF right ankle on 7/26/24 with Dr. Liz -- now c/b MSSA deep surgical site infection of right ankle involving underlying hardware with associated MSSA bacteremia.        S/p I&D on 09/06 and 9/9.  Op report noting purulence overlying hardware.  No plans for hardware removal at this time.  Unable to achieve primary closure and Plastic Surgery consulted.   A CTA of  lower extremity showed multifocal high grade stenosis throughout right calf, though no occlusion and Vascular Surgery consulted. Right TBI .41. Attempted angiogram 9/12, but developed extreme confusion/AMS and rapid respiratory rate  with versed/fentanyl and procedure postponed.   Further plans for angiogram still deferred for given DEEPALI (Nephrology following).   Plastics recommendations are pending Vascular workup.      Surgical cxs +GBS, MSSA.  Blood cultures 9/6 and 9/7 + for MSSA.  Repeat blood cultures 9/8, 9/11 NGTD.  2D echo negative.   Remains on IV oxacillin.        Afebrile.  WBC now downtrending - 14.  Creatinine 1.8 (downtrend). Liver enzymes normalized. 09/17 underwent angio and wound vac exchange. Hopefully pt candidate for flap closure.    Recommendations / Plan:  Continue IV oxacillin 12 g q 24 hours continuous infusion.   Monitor creatinine  clearance.  No dosage adjustment currently recommended, though neurotoxicity has been reported for cr cl <10.   To complete abx duration of 6wks s/p last I&D 09/09, DON 10/21.   Will f/u plastics recs for closure  Will hold off adding Rifampin until after tentative plastics flap/ closure,  to avoid further complications (intolerance, worsening DEEPALI, or potential DDI) or delays in surgical management.   So long as hardware remains, anticipate long term oral antibiotic suppression following course of IV abx     -- Discussed with ID staff and primary team   -- ID will continue to follow w/ further recs.            Thank you for your consult. I will follow-up with patient. Please contact us if you have any additional questions.    Andrew Kearns PA-C  Infectious Disease  [facility] - Surgery (2nd Fl)    Subjective:     Principal Problem:Surgical wound breakdown    HPI: Cortes Schroeder is a 64 year old with HTN, poorly controlled DM (A1C 8.5), recent history of syncopal episodes, and  right ankle fracture on 7/18/24 s/p ex fix with subsequent ORIF on 7/26/24 with Dr. Liz.  At outpatient Orthopedic follow up surgical wound was healing and sutures removed.  Since then patient  reportedly doing well until about 1 week ago when he noticed some drainage from his surgical incisions.  Over the past week he developed fevers, chills, and night sweats.  Yesterday he became confused/altered and was brought to ED.  In ED was hypotensive, tachycardic, WBC 18, .  Sepsis protocol initiated and started on clindamycin has been doing well postoperatively until around 1 week ago when he started noticing some drainage from his surgical incisions. He started to develop fevers, chills and night sweats over the past week. Yesterday afternoon, patient became altered and was brought to the ED by his roommate. Patient currently lives in [place]. Upon presentation to the ED, patient was tachycardic, hypotensive and afebrile. WBC of  18.68, .3. Sepsis protocol initiated. Patient was started on clindamycin and vancomycin.  Now on vancomycin and cefepime.       Orthopedics consulted.  Noted draining wound over the medial side of the right ankle as well as eschar formation over the lateral side of the ankle.  Right ankle and foot noted to be erythematous and edematous.  Pending surgical I&D today.           Interval History: Remains AF, VSS, wbc 14 downtrend, crp 188 (downtrend), Cr 1.3 (downtrend from 3.3). Maintained on Iv-oxacillin.     Review of Systems   Constitutional:  Positive for appetite change.   Respiratory:  Positive for cough and shortness of breath.    Gastrointestinal:  Negative for nausea.   Musculoskeletal:  Positive for arthralgias and myalgias.   Skin:  Positive for color change and wound.   All other systems reviewed and are negative.      Objective:     Vital Signs (Most Recent):  Temp: 98.2 °F (36.8 °C) (09/17/24 0430)  Pulse: 98 (09/17/24 0430)  Resp: 18 (09/17/24 0430)  BP: (!) 161/77 (09/17/24 0430)  SpO2: (!) 94 % (09/17/24 0430) Vital Signs (24h Range):  Temp:  [97.9 °F (36.6 °C)-98.8 °F (37.1 °C)] 98.2 °F (36.8 °C)  Pulse:  [] 98  Resp:  [17-18] 18  SpO2:  [94 %-98 %] 94 %  BP: (132-188)/(66-84) 161/77     Weight: 93 kg (205 lb 0.4 oz)  Body mass index is 34.12 kg/m².    Estimated Creatinine Clearance: 61 mL/min (based on SCr of 1.3 mg/dL).     Physical Exam  Vitals and nursing note reviewed.   Constitutional:       General: He is not in acute distress.     Appearance: Normal appearance. He is not toxic-appearing or diaphoretic.   HENT:      Nose: No congestion.      Mouth/Throat:      Pharynx: No oropharyngeal exudate.   Eyes:      General: No scleral icterus.     Conjunctiva/sclera: Conjunctivae normal.   Cardiovascular:      Rate and Rhythm: Normal rate and regular rhythm.      Heart sounds: No murmur heard.  Pulmonary:      Effort: Pulmonary effort is normal. No respiratory distress.      Breath sounds:  No wheezing.   Abdominal:      General: Bowel sounds are normal. There is no distension.      Palpations: Abdomen is soft.      Tenderness: There is no abdominal tenderness.   Musculoskeletal:      Cervical back: Neck supple. No tenderness.      Comments: Right ankle with surgical dressings in place + wound vac    Left wrist - no signs of infection   Skin:     General: Skin is warm and dry.   Neurological:      Mental Status: He is alert and oriented to person, place, and time.   Psychiatric:         Mood and Affect: Mood normal.         Behavior: Behavior normal.          Significant Labs: Blood Culture:   Recent Labs   Lab 09/06/24  0235 09/07/24  0910 09/08/24  1641 09/10/24  1711   LABBLOO Gram stain aer bottle: Gram positive cocci in clusters resembling Staph  Gram stain alicia bottle: Gram positive cocci in clusters resembling Staph  Results called to and read back by:Luciana Altamirano RN 09/06/2024  20:52  STAPHYLOCOCCUS AUREUS*  Gram stain aer bottle: Gram positive cocci in clusters resembling Staph  Gram stain alicia bottle: Gram positive cocci in clusters resembling Staph  Results called to and read back by:Luciana Altamirano RN 09/06/2024  20:55  STAPHYLOCOCCUS AUREUS  For susceptibility see order #[id number]  * Gram stain aer bottle: Gram positive cocci in clusters resembling Staph  Results called to and read back by: Roseanne Guevara RN 09/08/2024  14:07  Gram stain alicia bottle: Gram positive cocci in clusters resembling Staph  Positive results previously called 09/08/2024  STAPHYLOCOCCUS AUREUS  ID consult required at [facility] and [facility].  For susceptibility see order #[id number]  *  Gram stain aer bottle: Gram positive cocci in clusters resembling Staph  Gram stain alicia bottle: Gram positive cocci in clusters resembling Staph  Results called to and read back by:Luciana Altamirano LPN 09/08/2024  03:59  STAPHYLOCOCCUS AUREUS  ID consult required at [facility] and  [facility].  For susceptibility see order #[id number]  * No growth after 5 days.  No growth after 5 days. No growth after 5 days.     CBC:   Recent Labs   Lab 09/16/24  0510   WBC 14.14*   HGB 8.4*   HCT 25.7*   *     CMP:   Recent Labs   Lab 09/16/24  0510 09/17/24  0533   * 135*   K 4.1 4.2    105   CO2 18* 21*   * 225*   BUN 34* 28*   CREATININE 1.8* 1.3   CALCIUM 8.1* 8.1*   PROT 5.9* 6.1   ALBUMIN 1.3* 1.4*   BILITOT 0.4 0.4   ALKPHOS 267* 243*   AST 24 21   ALT 6* 6*   ANIONGAP 9 9     Microbiology Results (last 7 days)       Procedure Component Value Units Date/Time    Blood culture [[id number]] Collected: 09/10/24 1711    Order Status: Completed Specimen: Blood Updated: 09/15/24 2012     Blood Culture, Routine No growth after 5 days.    Urine culture [[id number]] Collected: 09/12/24 1543    Order Status: Completed Specimen: Urine Updated: 09/13/24 2309     Urine Culture, Routine No growth    Narrative:      add on Urine Creatinine and Urine Protein pe Hernandez NP/order#[id number] on 09/13/2024 @0025.    Specimen Source->Urine    Blood culture [[id number]] Collected: 09/08/24 1641    Order Status: Completed Specimen: Blood Updated: 09/13/24 2012     Blood Culture, Routine No growth after 5 days.    Narrative:      [id number]    Blood culture [[id number]] Collected: 09/08/24 1641    Order Status: Completed Specimen: Blood Updated: 09/13/24 2012     Blood Culture, Routine No growth after 5 days.    Narrative:      [id number]    Culture, Anaerobe [[id number]] Collected: 09/06/24 1446    Order Status: Completed Specimen: Incision site from Ankle, Right Updated: 09/12/24 1315     Anaerobic Culture No anaerobes isolated    Narrative:      Right ankle wound - culture    Blood culture x two cultures. Draw prior to antibiotics. [[id number]]  (Abnormal)  (Susceptibility) Collected: 09/06/24 0235    Order Status: Completed Specimen: Blood from Peripheral, Antecubital, Left Updated:  09/11/24 1203     Blood Culture, Routine Gram stain aer bottle: Gram positive cocci in clusters resembling Staph      Gram stain alicia bottle: Gram positive cocci in clusters resembling Staph      Results called to and read back by:Luciana Altamirano RN 09/06/2024      20:52      STAPHYLOCOCCUS AUREUS    Narrative:      Aerobic and anaerobic    Blood culture [[id number]]  (Abnormal) Collected: 09/07/24 0910    Order Status: Completed Specimen: Blood Updated: 09/10/24 1000     Blood Culture, Routine Gram stain aer bottle: Gram positive cocci in clusters resembling Staph      Results called to and read back by: Roseanne Guevara RN 09/08/2024  14:07      Gram stain alicia bottle: Gram positive cocci in clusters resembling Staph      Positive results previously called 09/08/2024      STAPHYLOCOCCUS AUREUS  [facility]  For susceptibility see order #[id number]      Blood culture [[id number]]  (Abnormal) Collected: 09/07/24 0910    Order Status: Completed Specimen: Blood Updated: 09/10/24 0959     Blood Culture, Routine Gram stain aer bottle: Gram positive cocci in clusters resembling Staph      Gram stain alicia bottle: Gram positive cocci in clusters resembling Staph      Results called to and read back by:Luciana Altamirano LPN 09/08/2024      03:59      STAPHYLOCOCCUS AUREUS  [facility].  For susceptibility see order #[id number]            Wound Culture:   Recent Labs   Lab 09/06/24  1446   LABAERO STAPHYLOCOCCUS AUREUS  Many  *  STREPTOCOCCUS AGALACTIAE (GROUP B)  Many  Beta-hemolytic streptococci are routinely susceptible to   penicillins,cephalosporins and carbapenems.  *     All pertinent labs within the past 24 hours have been reviewed.    Significant Imaging: I have reviewed all pertinent imaging results/findings within the past 24 hours.    I have personally reviewed records / hospital notes from   service and  other specialty providers. I have also reviewed CBC, CMP/BMP,  cultures and imaging with my interpretation as documented in my assessment / plan.    Patient is high risk for infectious complications given pt's age, multiple co-morbidities, and case complexity.      Time: 50 minutes   50% of time spent on face-to-face counseling and coordination of care. Counseling included review of test results, diagnosis, and treatment plan with patient and/or family.

## 2024-09-17 NOTE — ASSESSMENT & PLAN NOTE
Coughing on exam witnesseed 9/13 with water and some secretions in airway on CT, speech eval placed and rec continue liquids at this time  Protnoix started as some reflux may be contributing, and doxy stopped 9/14 in case contributing also. Still some scratchinses, using spray to throat on 9/15, speech to reeval 9/16 on Monday  Requested soft diet as hates the broth, upgraded 9/16 and did well, likely esophagitis main issue speech suspects

## 2024-09-17 NOTE — TRANSFER OF CARE
"Anesthesia Transfer of Care Note    Patient: Cortes Schroeder    Procedure(s) Performed: Procedure(s) (LRB):  Angiogram Extremity Unilateral (Right)  REPLACEMENT, WOUND VAC (Right)    Patient location: PACU    Anesthesia Type: general    Transport from OR: Transported from OR on room air with adequate spontaneous ventilation    Post pain: adequate analgesia    Post assessment: no apparent anesthetic complications    Post vital signs: stable    Level of consciousness: awake and alert    Nausea/Vomiting: no nausea/vomiting    Complications: none    Transfer of care protocol was followed      Last vitals: Visit Vitals  BP (!) 158/81 (BP Location: Right arm, Patient Position: Lying)   Pulse 98   Temp 36.8 °C (98.2 °F) (Temporal)   Resp 18   Ht 5' 5" (1.651 m)   Wt 93 kg (205 lb 0.4 oz)   SpO2 95%   BMI 34.12 kg/m²     "

## 2024-09-17 NOTE — PT/OT/SLP PROGRESS
Physical Therapy      Patient Name:  Cortes Schroeder   MRN:  [id number]    Patient not seen today secondary to Off the floor for procedure/surgery. (Angiogram) Will follow-up when appropriate.    9/17/2024

## 2024-09-17 NOTE — ASSESSMENT & PLAN NOTE
"This patient does have evidence of infective focus  My overall impression is sepsis.  Source: Skin and Soft Tissue (location ankle)  Antibiotics given-   Antibiotics (72h ago, onward)      Start     Stop Route Frequency Ordered    09/09/24 1730  oxacillin 12 g in  mL CONTINUOUS INFUSION         -- IV Every 24 hours (non-standard times) 09/09/24 1630          Latest lactate reviewed-  No results for input(s): "LACTATE", "POCLAC" in the last 72 hours.    Organ dysfunction indicated by Acute kidney injury and Encephalopathy    Fluid challenge Actual Body weight- Patient will receive 30ml/kg actual body weight to calculate fluid bolus for treatment of septic shock.     Post- resuscitation assessment No - Post resuscitation assessment not needed     Will Not start Pressors-   Source control achieved by: see above  -secondary to surgical wound infeection in right ankle, with 9/6 wound Cx with group B strep and staph with bacteremia with 9/7 and 9/6  blood with staph with 9/8 and 9/10 blood Cx NGTD  ID following. Echo without signs of vegtations. On oxacillin now. Wbc still higher at 22, and ID following, procal 2 on 9/12-->1 on 9/13. CRp improved to 235. Wbc starting to improve at 18 now. .doxy stopped given no PNA signs on CT. Reached out to ortho on 9/14 regarding ankle as no obvious other source of elevated wbc per discussion with ID to see if any plans to reassess wound under vac, last op note had necrosis and no obvious purulence. Wbc improving 9/15 at 15 now->14  -ID reports plan is oxacillin + orals after if hardware retained. Full final recs pending.  "

## 2024-09-17 NOTE — ASSESSMENT & PLAN NOTE
Endocrinology consulted for BG management.   BG goal 140-180    - Lantus (Insulin Glargine 24 units nightly (20% increase due to fasting BG above goal)   - Novolog 4-8 with meals (20% increase due to prandial blood glucose  above goal)  - [facility] [place] (150/25)  - BG checks q4hr  - Hypoglycemia protocol in place    ** Please notify Endocrine for any change and/or advance in diet**  ** Please call Endocrine for any BG related issues **    Discharge Planning:   TBD. Please notify endocrinology prior to discharge.

## 2024-09-17 NOTE — SUBJECTIVE & OBJECTIVE
Interval History: Remains AF, VSS, wbc 14 downtrend, crp 188 (downtrend), Cr 1.3 (downtrend from 3.3). Maintained on Iv-oxacillin. Awaiting vasc plans for angio. Plastics and ortho plans for closure pending angio.     Review of Systems   Constitutional:  Positive for appetite change.   Respiratory:  Positive for cough and shortness of breath.    Gastrointestinal:  Negative for nausea.   Musculoskeletal:  Positive for arthralgias and myalgias.   Skin:  Positive for color change and wound.   All other systems reviewed and are negative.      Objective:     Vital Signs (Most Recent):  Temp: 98.2 °F (36.8 °C) (09/17/24 0430)  Pulse: 98 (09/17/24 0430)  Resp: 18 (09/17/24 0430)  BP: (!) 161/77 (09/17/24 0430)  SpO2: (!) 94 % (09/17/24 0430) Vital Signs (24h Range):  Temp:  [97.9 °F (36.6 °C)-98.8 °F (37.1 °C)] 98.2 °F (36.8 °C)  Pulse:  [] 98  Resp:  [17-18] 18  SpO2:  [94 %-98 %] 94 %  BP: (132-188)/(66-84) 161/77     Weight: 93 kg (205 lb 0.4 oz)  Body mass index is 34.12 kg/m².    Estimated Creatinine Clearance: 61 mL/min (based on SCr of 1.3 mg/dL).     Physical Exam  Vitals and nursing note reviewed.   Constitutional:       General: He is not in acute distress.     Appearance: Normal appearance. He is not toxic-appearing or diaphoretic.   HENT:      Nose: No congestion.      Mouth/Throat:      Pharynx: No oropharyngeal exudate.   Eyes:      General: No scleral icterus.     Conjunctiva/sclera: Conjunctivae normal.   Cardiovascular:      Rate and Rhythm: Normal rate and regular rhythm.      Heart sounds: No murmur heard.  Pulmonary:      Effort: Pulmonary effort is normal. No respiratory distress.      Breath sounds: No wheezing.   Abdominal:      General: Bowel sounds are normal. There is no distension.      Palpations: Abdomen is soft.      Tenderness: There is no abdominal tenderness.   Musculoskeletal:      Cervical back: Neck supple. No tenderness.      Comments: Right ankle with surgical dressings in place  + wound vac    Left wrist - no signs of infection   Skin:     General: Skin is warm and dry.   Neurological:      Mental Status: He is alert and oriented to person, place, and time.   Psychiatric:         Mood and Affect: Mood normal.         Behavior: Behavior normal.          Significant Labs: Blood Culture:   Recent Labs   Lab 09/06/24  0235 09/07/24  0910 09/08/24  1641 09/10/24  1711   LABBLOO Gram stain aer bottle: Gram positive cocci in clusters resembling Staph  Gram stain alicia bottle: Gram positive cocci in clusters resembling Staph  Results called to and read back by:Luciana Altamirano RN 09/06/2024  20:52  STAPHYLOCOCCUS AUREUS*  Gram stain aer bottle: Gram positive cocci in clusters resembling Staph  Gram stain alicia bottle: Gram positive cocci in clusters resembling Staph  Results called to and read back by:Luciana Altamirano RN 09/06/2024  20:55  STAPHYLOCOCCUS AUREUS  For susceptibility see order #[id number]  * Gram stain aer bottle: Gram positive cocci in clusters resembling Staph  Results called to and read back by: Roseanne Guevara RN 09/08/2024  14:07  Gram stain alicia bottle: Gram positive cocci in clusters resembling Staph  Positive results previously called 09/08/2024  STAPHYLOCOCCUS AUREUS  ID consult required at [facility].  For susceptibility see order #[id number]  *  Gram stain aer bottle: Gram positive cocci in clusters resembling Staph  Gram stain alicia bottle: Gram positive cocci in clusters resembling Staph  Results called to and read back by:Luciana Altamirano LPN 09/08/2024  03:59  STAPHYLOCOCCUS AUREUS  ID consult required at [facility].  For susceptibility see order #[id number]  * No growth after 5 days.  No growth after 5 days. No growth after 5 days.     CBC:   Recent Labs   Lab 09/16/24  0510   WBC 14.14*   HGB 8.4*   HCT 25.7*   *     CMP:   Recent Labs   Lab 09/16/24  0510 09/17/24  0533   * 135*    K 4.1 4.2    105   CO2 18* 21*   * 225*   BUN 34* 28*   CREATININE 1.8* 1.3   CALCIUM 8.1* 8.1*   PROT 5.9* 6.1   ALBUMIN 1.3* 1.4*   BILITOT 0.4 0.4   ALKPHOS 267* 243*   AST 24 21   ALT 6* 6*   ANIONGAP 9 9     Microbiology Results (last 7 days)       Procedure Component Value Units Date/Time    Blood culture [[id number]] Collected: 09/10/24 1711    Order Status: Completed Specimen: Blood Updated: 09/15/24 2012     Blood Culture, Routine No growth after 5 days.    Urine culture [[id number]] Collected: 09/12/24 1543    Order Status: Completed Specimen: Urine Updated: 09/13/24 2309     Urine Culture, Routine No growth    Narrative:      add on Urine Creatinine and Urine Protein pe Hernandez NP/order#[id number] on 09/13/2024 @0025.    Specimen Source->Urine    Blood culture [[id number]] Collected: 09/08/24 1641    Order Status: Completed Specimen: Blood Updated: 09/13/24 2012     Blood Culture, Routine No growth after 5 days.    Narrative:      [id number]    Blood culture [[id number]] Collected: 09/08/24 1641    Order Status: Completed Specimen: Blood Updated: 09/13/24 2012     Blood Culture, Routine No growth after 5 days.    Narrative:      [id number]    Culture, Anaerobe [[id number]] Collected: 09/06/24 1446    Order Status: Completed Specimen: Incision site from Ankle, Right Updated: 09/12/24 1315     Anaerobic Culture No anaerobes isolated    Narrative:      Right ankle wound - culture    Blood culture x two cultures. Draw prior to antibiotics. [[id number]]  (Abnormal)  (Susceptibility) Collected: 09/06/24 0235    Order Status: Completed Specimen: Blood from Peripheral, Antecubital, Left Updated: 09/11/24 1203     Blood Culture, Routine Gram stain aer bottle: Gram positive cocci in clusters resembling Staph      Gram stain alicia bottle: Gram positive cocci in clusters resembling Staph      Results called to and read back by:Luciana Altamirano RN 09/06/2024      20:52      STAPHYLOCOCCUS AUREUS     Narrative:      Aerobic and anaerobic    Blood culture [[id number]]  (Abnormal) Collected: 09/07/24 0910    Order Status: Completed Specimen: Blood Updated: 09/10/24 1000     Blood Culture, Routine Gram stain aer bottle: Gram positive cocci in clusters resembling Staph      Results called to and read back by: Roseanne Guevara RN 09/08/2024  14:07      Gram stain alicia bottle: Gram positive cocci in clusters resembling Staph      Positive results previously called 09/08/2024      STAPHYLOCOCCUS AUREUS  ID consult required at [facility] locations.  For susceptibility see order #[id number]      Blood culture [[id number]]  (Abnormal) Collected: 09/07/24 0910    Order Status: Completed Specimen: Blood Updated: 09/10/24 0959     Blood Culture, Routine Gram stain aer bottle: Gram positive cocci in clusters resembling Staph      Gram stain alicia bottle: Gram positive cocci in clusters resembling Staph      Results called to and read back by:Luciana Altamirano LPN 09/08/2024      03:59      STAPHYLOCOCCUS AUREUS  [facility].  For susceptibility see order #[id number]            Wound Culture:   Recent Labs   Lab 09/06/24  1446   LABAERO STAPHYLOCOCCUS AUREUS  Many  *  STREPTOCOCCUS AGALACTIAE (GROUP B)  Many  Beta-hemolytic streptococci are routinely susceptible to   penicillins,cephalosporins and carbapenems.  *     All pertinent labs within the past 24 hours have been reviewed.    Significant Imaging: I have reviewed all pertinent imaging results/findings within the past 24 hours.    I have personally reviewed records / hospital notes from   service and other specialty providers. I have also reviewed CBC, CMP/BMP,  cultures and imaging with my interpretation as documented in my assessment / plan.    Patient is high risk for infectious complications given pt's age, multiple co-morbidities, and case complexity.      Time: 50 minutes   50% of time spent on  face-to-face counseling and coordination of care. Counseling included review of test results, diagnosis, and treatment plan with patient and/or family.

## 2024-09-17 NOTE — BRIEF OP NOTE
[facility] - Surgery ([facility])  Brief Operative Note    SUMMARY     Surgery Date: 9/17/2024     Surgeons and Role:  Panel 1:     * GINA Morton II, MD - Primary     * Terell Alejandro MD - Fellow  Panel 2:     * Moe Liz MD - Primary     * Artur Galvan MD - Assisting    Pre-op Diagnosis:  Dehiscence of operative wound, initial encounter [T81.31XA]    Post-op Diagnosis:  Post-Op Diagnosis Codes:     * Dehiscence of operative wound, initial encounter [T81.31XA]    Procedure(s) (LRB):  Angiogram Extremity Unilateral (Right)  REPLACEMENT, WOUND VAC (Right)    US Guided Access to L CFA  RLE angiogram  PTA (2x220 Ultraverse) to AT and PT    Anesthesia: Local MAC    Implants:  * No implants in log *    Operative Findings: Restoration of inline flow to AT and PT. 3 vessel runoff to the foot.     Estimated Blood Loss: Minimal, 10 cc    Estimated Blood Loss has been documented.         Specimens:   Specimen (24h ago, onward)      None            [id number]

## 2024-09-17 NOTE — SUBJECTIVE & OBJECTIVE
"Interval HPI:   Overnight events: No acute events overnight. Patient in room 542/542 A. Blood glucose worsening. BG above goal on current insulin regimen (SSI, prandial, and basal insulin ). Steroid use- None. 5 Days Post-Op  Renal function- Normal   Vasopressors-  None       Endocrine will continue to follow and manage insulin orders inpatient.         Diet NPO Except for: Sips with Medication     Eating:   NPO  Nausea: No  Hypoglycemia and intervention: No  Fever: No  TPN and/or TF: No      BP (!) 168/80 (BP Location: Right arm, Patient Position: Lying)   Pulse 97   Temp 97.9 °F (36.6 °C) (Oral)   Resp 14   Ht 5' 5" (1.651 m)   Wt 93 kg (205 lb 0.4 oz)   SpO2 96%   BMI 34.12 kg/m²     Labs Reviewed and Include    Recent Labs   Lab 09/17/24  0533   *   CALCIUM 8.1*   ALBUMIN 1.4*   PROT 6.1   *   K 4.2   CO2 21*      BUN 28*   CREATININE 1.3   ALKPHOS 243*   ALT 6*   AST 21   BILITOT 0.4     Lab Results   Component Value Date    WBC 14.74 (H) 09/17/2024    HGB 8.5 (L) 09/17/2024    HCT 26.1 (L) 09/17/2024    MCV 86 09/17/2024     (H) 09/17/2024     No results for input(s): "TSH", "FREET4" in the last 168 hours.  Lab Results   Component Value Date    HGBA1C 8.5 (H) 09/06/2024       Nutritional status:   Body mass index is 34.12 kg/m².  Lab Results   Component Value Date    ALBUMIN 1.4 (L) 09/17/2024    ALBUMIN 1.3 (L) 09/16/2024    ALBUMIN 1.3 (L) 09/15/2024     Lab Results   Component Value Date    PREALBUMIN 3 (L) 09/06/2024    PREALBUMIN 13 (L) 07/18/2024       Estimated Creatinine Clearance: 61 mL/min (based on SCr of 1.3 mg/dL).    Accu-Checks  Recent Labs     09/15/24  0723 09/15/24  1124 09/15/24  1532 09/15/24  2105 09/16/24  0002 09/16/24  0516 09/16/24  0735 09/16/24  1113 09/16/24  1521 09/16/24  2119   POCTGLUCOSE 152* 155* 224* 209* 192* 198* 208* 203* 267* 257*       Current Medications and/or Treatments Impacting Glycemic Control  Immunotherapy:    Immunosuppressants  "      None          Steroids:   Hormones (From admission, onward)      None          Pressors:    Autonomic Drugs (From admission, onward)      None          Hyperglycemia/Diabetes Medications:   Antihyperglycemics (From admission, onward)      Start     Stop Route Frequency Ordered    09/17/24 2100  insulin glargine U-100 (Lantus) pen 25 Units         -- SubQ Nightly 09/17/24 0815    09/17/24 1130  insulin aspart U-100 pen 4-8 Units         -- SubQ 3 times daily with meals 09/17/24 0815    09/13/24 0830  insulin aspart U-100 pen 0-10 Units         -- SubQ Every 4 hours PRN 09/13/24 0731

## 2024-09-17 NOTE — ASSESSMENT & PLAN NOTE
64-year-old gentleman presenting with nonhealing wound of the right lower extremity.    - Cath lab RLE angiogram aborted on 9/12 after needle access secondary to increased work of breathing, altered mental status, not following directions.   - RLE angiogram scheduled for 9/17, cr 1.3 this morning  - NPO

## 2024-09-17 NOTE — OP NOTE
OP NOTE    DOS:  09/17/2024    Preop Dx: Open wound medial right ankle status post ORIF trimalleolar ankle fracture    Postop Dx: Open wound medial right ankle status post ORIF trimalleolar ankle fracture    Procedure: Irrigation we will change right medial ankle less than 50 sq cm    Surgeon: Moe Liz M.D.    Asst:  Artur Galvan M.D    Anesthesia: Mac    EBL:  Minimal    IVF:  300 cc    Implants: None    Specimens: None    Findings: Wound VAC change    Dispo:  To PACU awake size stable       Indications for Procedure:      64-year-old male post kidney transplant with peripheral vascular disease and medial and lateral wound breakdown status post ORIF of right trimalleolar ankle fracture going to the operating room today for balloon angioplasty.  He has an open medial wound with a wound VAC in place and will likely require free flap coverage if possible.  Vascular surgeries bringing him for balloon angioplasty in order to increase blood flow to the ankle.  We will washing provide wound VAC change in the medial wound that time.    Procedure in Detail:    Patient was on the table for the balloon angioplasty procedure.  At the termination of this we prepped and draped the right lower extremity by wiping this down with chlorhexidine and alcohol and then prepping the foot and holding in the air.  The wound VAC sponges were then removed a sterile gloves.  The remainder of the leg was prepped with chlorhexidine on the intact skin and Betadine paint within the wound.  Time-out was undertaken to confirm patient, side, site, surgery, surgeon and administration of antibiotics.  All agreed we proceeded.      On the medial side this was irrigated copiously with normal saline solution.  I do not see any purulence.  The surrounding tissue was non excisionally debrided with lap sponge with minimal fat coming off.  This was then irrigated copiously normal saline solution.      At this point a white sponge was placed overlying  the medial malleolus and posterior tibial tendon followed by a black sponge.  Nisha pad was placed a good suction seal was obtained.  We had placed an incisional wound VAC overlying the closed lateral wound at prior surgery but elected to place an island dressing over this at this point in time.    Patient was then taken to the recovery room without incident.      Plan the patient:    We will continue to treat with IV antibiotics for the infection that he had in that area.  Hopefully with increased blood flow in the ankle, he will continue to heal his lateral wound and be a candidate for a medial free flap.    Moe Liz MD

## 2024-09-17 NOTE — NURSING TRANSFER
Nursing Transfer Note      9/17/2024   2:40 PM    Reason patient is being transferred: Recovery criteria met    PACU nurse giving handoff: FRANCHESKA Shi    Nurse receiving handoff: FRANCHESKA Olvera    Transfer to 542    Transfer via bed    Transfer with cardiac monitoring & O2 @ 2LPM    Transported by Patient Escort    Telemetry: Box # [id number]  HR 90 NSR  Verified on & working by PACU RN: Yes    Transfer Vital Signs @ 1430  Temperature: 97.7  Blood Pressure: 166/78  Heart Rate: 88  O2 Sat: 96% on 2LPM  Respirations: 20    Medicines/Equipment sent with patient: oxacillin infusing/ RLE wound vac in place    Any special needs or follow-up needed: Flat bedrest until 1500    Patient belongings transferred with patient: No    Chart send with patient: Yes    Notified: Roommate, Juan    Patient reassessed prior to transfer at: 9/17/24 @ 1430

## 2024-09-17 NOTE — PT/OT/SLP PROGRESS
Occupational Therapy      Patient Name:  Cortes Schroeder   MRN:  [id number]    Patient not seen today secondary to Off the floor for procedure/surgery (Angiogram). Will follow-up when medically appropriate.    9/17/2024

## 2024-09-17 NOTE — ASSESSMENT & PLAN NOTE
Patient's FSGs are not controlled on current hypoglycemics.   Last A1c reviewed-   Lab Results   Component Value Date    LABA1C 10.8 (H) 08/15/2016    HGBA1C 8.5 (H) 09/06/2024     Most recent fingerstick glucose reviewed-   Recent Labs   Lab 09/16/24  1113 09/16/24  1521 09/16/24  2119   POCTGLUCOSE 203* 267* 257*       Current correctional scale  Low  Maintain anti-hyperglycemic dose as follows-   Antihyperglycemics (From admission, onward)      Start     Stop Route Frequency Ordered    09/17/24 2100  insulin glargine U-100 (Lantus) pen 25 Units         -- SubQ Nightly 09/17/24 0815    09/17/24 1130  insulin aspart U-100 pen 4-8 Units         -- SubQ 3 times daily with meals 09/17/24 0815    09/13/24 0830  insulin aspart U-100 pen 0-10 Units         -- SubQ Every 4 hours PRN 09/13/24 0731           - Endocrine consulted:  - At this time, recommend that the patient remain off of his pump since he cannot be controlled in the Auto mode. Patient does not interact with pump frequently and relies on the auto mode algorithm.   - Lantus (Insulin Glargine) 35   - Novolog (Insulin Aspart) 9 units TIDWM (20% decrease given post-prandial BG below goal) (Hold if NPO) and prn for BG excursions MDC SSI (150/25)   - hold oral antihyperglycemics during hospitalization   - needs better BG control for optimal wound healing   Doses decreased on 9/12 for hypoglycemia this AM with endo managing and hypoglycemia again 9/13 in early AM overnight prior and endo adjusting further.   9/17: 220 this AM, suspect will start going up given advancing diet and more intake and endo adjusting

## 2024-09-17 NOTE — ASSESSMENT & PLAN NOTE
DEEPALI is likely due to pre-renal azotemia due to dehydration. Baseline creatinine is  1 . Most recent creatinine and eGFR are listed below.  Recent Labs     09/15/24  0241 09/16/24  0510 09/17/24  0533   CREATININE 2.7* 1.8* 1.3   EGFRNORACEVR 25.7* 41.8* >60.0        Plan  - DEEPALI is worsening  - Avoid nephrotoxins and renally dose meds for GFR listed above  - Monitor urine output, serial BMP, and adjust therapy as needed  - baseline 1.6--2.1--2.6 now. EF on prev echo 70% so signs of volume depletion, as well as hyponatremia and tachycardia. Resume IVF 9/12   Renal consulted, rec stop IVF 9/13, ATN on urine microscopy with casts, secondary to sepsis/contrast likely combination and cr not at plateau yet with Cr 3.3 on 9/14 and supportive care in interim.  Improving now at 2.7 on 9/15 with output picking up at 1.3 L.  And Cr now 1.9 and output 1.8L.  Cr 1.3 now on 9/17, angiogram today

## 2024-09-17 NOTE — ASSESSMENT & PLAN NOTE
Titrate insulin slowly to avoid hypoglycemia as the risk of hypoglycemia increases with decreased creatinine clearance.  Estimated Creatinine Clearance: 61 mL/min (based on SCr of 1.3 mg/dL).

## 2024-09-17 NOTE — PROGRESS NOTES
"[facility] (2nd Fl)  Adult Nutrition  Progress Note    SUMMARY   Recommendations    When medically feasible, continue diabetic diet  When medically feasible, continue Boost Breeze & boost GC BID as tolerated  If pt not consuming all 4 protein shakes, discontinue least desired one (preferably boost breeze)  Continue Abhijit BID  If additional protein needed, add Prosource once daily for (16 grams of protein)   RD following    Goals: Meet % een/epn by next RD f/u  Nutrition Goal Status: new  Communication of RD Recs: other (comment) (poc)    Assessment and Plan    Nutrition Problem  Increased Protein needs     Related to (etiology):   Wound healing     Signs and Symptoms (as evidenced by):   Leg wound     Interventions/Recommendations (treatment strategy):  Collaboration of nutritional care with other providers.  Commercial beverage     Nutrition Diagnosis Status:   New      Reason for Assessment    Reason For Assessment: RD follow-up  Relevant Medical History: DM2 (A1c 8.5), GERD, HLD, HTN  Interdisciplinary Rounds: did not attend  General Information Comments: RD f/u, pt CESAR for surgery. NPO at this time. 25% intake per chart yesterday, 75% intake the day before. 5 days post op angiogram. Wound is nutritionally significant. RD following.  Nutrition Discharge Planning: diabetic, high protein diet    Nutrition Related Social Determinants of Health: SDOH: Unable to assess at this time.      Nutrition Risk Screen    Nutrition Risk Screen: no indicators present    Nutrition/Diet History    Spiritual, Cultural Beliefs, Taoism Practices, Values that Affect Care: no  Food Allergies: NKFA    Anthropometrics    Temp: 98.2 °F (36.8 °C)  Height Method: Estimated  Height: 5' 5" (165.1 cm)  Height (inches): 65 in  Weight Method: Bed Scale  Weight: 93 kg (205 lb 0.4 oz)  Weight (lb): 205.03 lb  Ideal Body Weight (IBW), Male: 136 lb  % Ideal Body Weight, Male (lb): 150.76 %  BMI (Calculated): 34.1  BMI Grade: 30 - " 34.9- obesity - grade I       Lab/Procedures/Meds    Pertinent Labs Reviewed: reviewed  Pertinent Labs Comments: H/H: 8.5/26.1, Na: 135, BUN: 28, Glu: 225, ALT; 6  Pertinent Medications Reviewed: reviewed  Pertinent Medications Comments: Enoxaparin, insulin, morphine, abx, pantoprazole, polyethylene glycol, senna, na bicarb, NaCl      Estimated/Assessed Needs    Weight Used For Calorie Calculations: 93 kg (205 lb 0.4 oz)  Energy Calorie Requirements (kcal): 1651 (msj)  Energy Need Method: [place]-St Jeor  Protein Requirements: 139-186 (1.5-2 g/kg)  Weight Used For Protein Calculations: 93 kg (205 lb 0.4 oz)     Estimated Fluid Requirement Method: RDA Method  RDA Method (mL): 1651       Nutrition Prescription Ordered    Current Diet Order: NPO  Oral Nutrition Supplement: Boost Breeze TID, Abhijit BID, Boost Glucose Control TID    Evaluation of Received Nutrient/Fluid Intake    I/O: -5.2 L since admit  Comments: LBM 9/16  Tolerance: tolerating  % Intake of Estimated Energy Needs: 0 - 25 %  % Meal Intake: NPO    Nutrition Risk    Level of Risk/Frequency of Follow-up: low - moderate (f/u 1x/week)     Monitor and Evaluation    Food and Nutrient Intake: energy intake, food and beverage intake  Food and Nutrient Adminstration: diet order  Anthropometric Measurements: height/length, weight change, weight, body mass index  Biochemical Data, Medical Tests and Procedures: electrolyte and renal panel, glucose/endocrine profile, gastrointestinal profile, inflammatory profile, lipid profile     Nutrition Follow-Up    RD Follow-up?: Yes    Pamella Castrejon RD, LDN

## 2024-09-17 NOTE — ASSESSMENT & PLAN NOTE
Closed trimalleolar fracture of right ankle s/p ORIF in July  63 yo M presenting with AMS and worsening redness, swelling and pain to R ankle.     - continue oxacillin  - Ortho consulted:   - taken to OR 09/06 for debridement of surgical wound with wound vac placed. CX with group B strep and staph.   - Repeat I&D of R medial ankle on 09/09   - recommending plastics eval for free flap- this is pending angiogram.  Wound vac in place- vac change 9/17.  - follow wound cultures -- Staph aureus and GBS  - follow blood cultures -- Staph aureus.   - ID following:  - Continue IV oxacillin 12 g q 24 hours continuous infusion.  Monitor liver enzymes.  Plan for orals if hardware retained once completed.  - Anticipate 6 weeks of IV antibiotics from date of most recent washout.- tentative end date 10/21    - Ordered Hep B serologies before tentative discontinuation of Truvada, and addition of adjunctive rifampin for biofilm penetration of retained hardware   - Anticipate long term oral  antibiotic suppression following IV abx   - Will follow.    - Plastic consulted:   - CTA reviewed, given numerous areas of high grade stenosis, will need Vascular consult for possible angiogram to pre-optimiize blood flow prior to any free flap or pedicled propellar flap. Angiogram on hold for DEEPALI which has resolved now so angiogram 9/17 pending  - continue optimization of BG, endocrinology on board  - ID on board, continue  IV abx.  - orthopedics on board to assess for source control, possible hardware removal. Vac change 9/17  - malnutition: will need optimization prior to OR. Prealbumin and transferrin low on last check.   - Plan: after medically optimized, plan for flap closure of wound.   - Vascular surgery consulted; appreciate recs . Angiogram aborted 9/12 for mental status as well as 9/13 for DEEPALI. Will replan once Cr improves- now 9/17

## 2024-09-17 NOTE — PROGRESS NOTES
[facility]  Vascular Surgery  Progress Note    Patient Name: Cortes Schroeder  MRN: [id number]  Admission Date: 9/6/2024  Primary Care Provider: Andrew Sinclair Jr., MD    Subjective:     Interval History: NAEON, creatinine decreased to 1.3 this morning.  Scheduled for RLE angiogram this afternoon, patient is aware he is NPO.     Post-Op Info:  Procedure(s) (LRB):  Angiogram, Lower Arterial, Unilateral (Right)   5 Days Post-Op     Medications:  Continuous Infusions:  Scheduled Meds:   amLODIPine  10 mg Oral Daily    enoxaparin  30 mg Subcutaneous Daily    guaiFENesin  600 mg Oral BID    insulin aspart U-100  3-6 Units Subcutaneous TIDWM    insulin glargine U-100  20 Units Subcutaneous QHS    morphine  2 mg Intravenous Once    nortriptyline  50 mg Oral Nightly    oxacillin 12 g in  mL CONTINUOUS INFUSION  12 g Intravenous Q24H    pantoprazole  40 mg Oral Daily    polyethylene glycol  17 g Oral BID    senna  8.6 mg Oral BID    sodium bicarbonate  1,300 mg Oral TID    sucralfate  1 g Oral Q6H     PRN Meds:  Current Facility-Administered Medications:     acetaminophen, 500 mg, Oral, Q8H PRN    albuterol-ipratropium, 3 mL, Nebulization, Q4H PRN    calcium carbonate, 1,000 mg, Oral, TID PRN    dextrose 10%, 12.5 g, Intravenous, PRN    dextrose 10%, 12.5 g, Intravenous, PRN    dextrose 10%, 25 g, Intravenous, PRN    dextrose 10%, 25 g, Intravenous, PRN    glucagon (human recombinant), 1 mg, Intramuscular, PRN    glucose, 16 g, Oral, PRN    glucose, 24 g, Oral, PRN    hydrALAZINE, 25 mg, Oral, Q8H PRN    insulin aspart U-100, 0-10 Units, Subcutaneous, Q4H PRN    methocarbamoL, 500 mg, Oral, TID PRN    morphine, 2 mg, Intravenous, Q6H PRN    morphine, 4 mg, Intravenous, Q6H PRN    ondansetron, 8 mg, Oral, Q8H PRN     Objective:     Vital Signs (Most Recent):  Temp: 98.2 °F (36.8 °C) (09/17/24 0430)  Pulse: 98 (09/17/24 0430)  Resp: 18 (09/17/24 0430)  BP: (!) 161/77 (09/17/24 0430)  SpO2: (!) 94 % (09/17/24  0430) Vital Signs (24h Range):  Temp:  [97.9 °F (36.6 °C)-98.8 °F (37.1 °C)] 98.2 °F (36.8 °C)  Pulse:  [] 98  Resp:  [17-18] 18  SpO2:  [94 %-98 %] 94 %  BP: (132-188)/(66-84) 161/77          Physical Exam  Eyes:      Pupils: Pupils are equal, round, and reactive to light.   Cardiovascular:      Rate and Rhythm: Normal rate.   Pulmonary:      Effort: Pulmonary effort is normal.   Abdominal:      General: Abdomen is flat.   Skin:     General: Skin is warm.      Comments: RLE surgical dressings, wound vac   Neurological:      Mental Status: He is alert.          Significant Labs:  CBC:   Recent Labs   Lab 09/16/24  0510   WBC 14.14*   RBC 3.04*   HGB 8.4*   HCT 25.7*   *   MCV 85   MCH 27.6   MCHC 32.7     CMP:   Recent Labs   Lab 09/17/24  0533   *   CALCIUM 8.1*   ALBUMIN 1.4*   PROT 6.1   *   K 4.2   CO2 21*      BUN 28*   CREATININE 1.3   ALKPHOS 243*   ALT 6*   AST 21   BILITOT 0.4       Significant Diagnostics:  I have reviewed all pertinent imaging results/findings within the past 24 hours.  Assessment/Plan:     PAD (peripheral artery disease)  64-year-old gentleman presenting with nonhealing wound of the right lower extremity.    - Cath lab RLE angiogram aborted on 9/12 after needle access secondary to increased work of breathing, altered mental status, not following directions.   - RLE angiogram scheduled for 9/17, cr 1.3 this morning  - NPO           Mayi Santiago NP  Vascular Surgery  [facility]

## 2024-09-17 NOTE — SUBJECTIVE & OBJECTIVE
Principal Problem:Surgical wound breakdown    Principal Orthopedic Problem: s/p I&D and wound vac placement on 09/06    Interval History: Plan for vascular angio today. NPO since midnight. Reiterated plan with patient that we will be present to change medial wound vac today. Cr continues to improve.        Review of patient's allergies indicates:   Allergen Reactions    Invokana [canagliflozin] Anaphylaxis    Percocet [oxycodone-acetaminophen] Nausea Only and Hallucinations    Biaxin [clarithromycin]     Hydrocodone Other (See Comments)     Dizzy/nausea/hallucinations    Sulfa (sulfonamide antibiotics) Nausea Only and Rash       Current Facility-Administered Medications   Medication    acetaminophen tablet 500 mg    albuterol-ipratropium 2.5 mg-0.5 mg/3 mL nebulizer solution 3 mL    amLODIPine tablet 10 mg    calcium carbonate 200 mg calcium (500 mg) chewable tablet 1,000 mg    dextrose 10% bolus 125 mL 125 mL    dextrose 10% bolus 125 mL 125 mL    dextrose 10% bolus 250 mL 250 mL    dextrose 10% bolus 250 mL 250 mL    enoxaparin injection 30 mg    glucagon (human recombinant) injection 1 mg    glucose chewable tablet 16 g    glucose chewable tablet 24 g    guaiFENesin 12 hr tablet 600 mg    hydrALAZINE tablet 25 mg    insulin aspart U-100 pen 0-10 Units    insulin aspart U-100 pen 4-8 Units    insulin glargine U-100 (Lantus) pen 25 Units    methocarbamoL tablet 500 mg    morphine injection 2 mg    morphine injection 2 mg    morphine injection 4 mg    nortriptyline capsule 50 mg    ondansetron disintegrating tablet 8 mg    oxacillin 12 g in  mL CONTINUOUS INFUSION    pantoprazole EC tablet 40 mg    polyethylene glycol packet 17 g    senna tablet 8.6 mg    sodium bicarbonate tablet 1,300 mg    sucralfate 100 mg/mL suspension 1 g     Objective:     Vital Signs (Most Recent):  Temp: 97.9 °F (36.6 °C) (09/17/24 0801)  Pulse: 97 (09/17/24 0801)  Resp: 14 (09/17/24 0801)  BP: (!) 168/80 (09/17/24 0801)  SpO2: 96 %  "(09/17/24 0801) Vital Signs (24h Range):  Temp:  [97.9 °F (36.6 °C)-98.8 °F (37.1 °C)] 97.9 °F (36.6 °C)  Pulse:  [] 97  Resp:  [14-18] 14  SpO2:  [94 %-97 %] 96 %  BP: (132-168)/(66-80) 168/80     Weight: 93 kg (205 lb 0.4 oz)  Height: 5' 5" (165.1 cm)  Body mass index is 34.12 kg/m².      Intake/Output Summary (Last 24 hours) at 9/17/2024 0848  Last data filed at 9/17/2024 0559  Gross per 24 hour   Intake --   Output 1700 ml   Net -1700 ml        Ortho/SPM Exam     AAOx4  NAD  Tachycardic  No increased WOB    RLE  Splint C/D/I   Wound vac to good suction  Compartments soft/compressible  [place]   Active toe dorsiflexion & plantarflexion  WWP. Brisk cap refill      Significant Labs:     Recent Results (from the past 336 hour(s))   Basic metabolic panel    Collection Time: 09/07/24  5:19 AM   Result Value Ref Range    Sodium 132 (L) 136 - 145 mmol/L    Potassium 4.0 3.5 - 5.1 mmol/L    Chloride 104 95 - 110 mmol/L    CO2 18 (L) 23 - 29 mmol/L    BUN 23 8 - 23 mg/dL    Creatinine 1.2 0.5 - 1.4 mg/dL    Calcium 8.5 (L) 8.7 - 10.5 mg/dL    Anion Gap 10 8 - 16 mmol/L         Significant Imaging: I have reviewed and interpreted all pertinent imaging results/findings.  CTA RLE: Multifocal high-grade stenoses throughout the right calf arteries. No arterial occlusion.    CTA chest: No large central PE identified  "

## 2024-09-17 NOTE — ANESTHESIA POSTPROCEDURE EVALUATION
Anesthesia Post Evaluation    Patient: Cortes Schroeder    Procedure(s) Performed: Procedure(s) (LRB):  Angiogram Extremity Unilateral (Right)  REPLACEMENT, WOUND VAC (Right)    Final Anesthesia Type: general      Patient location during evaluation: PACU  Patient participation: Yes- Able to Participate  Level of consciousness: awake and alert  Post-procedure vital signs: reviewed and stable  Pain management: adequate  Airway patency: patent    PONV status at discharge: No PONV  Anesthetic complications: no      Cardiovascular status: hemodynamically stable  Hydration status: euvolemic  Follow-up not needed.              Vitals Value Taken Time   /72 09/17/24 1452   Temp 36.7 °C (98 °F) 09/17/24 1452   Pulse 97 09/17/24 1545   Resp 16 09/17/24 1452   SpO2 94 % 09/17/24 1452         Event Time   Out of Recovery 09/17/2024 13:45:00         Pain/Judy Score: Pain Rating Prior to Med Admin: 6 (9/17/2024  2:18 PM)  Pain Rating Post Med Admin: 4 (9/17/2024  2:30 PM)  Judy Score: 10 (9/17/2024  2:00 PM)

## 2024-09-17 NOTE — ASSESSMENT & PLAN NOTE
Required delvalle 9/12 for retenetion, per nursing had had issues on/off the last few adys and had immediate return of >300 cc once placed and very concentrated dark urine. Urien with sediment now and darker yellow on exam 9/13. Questionable infectious process with bacerueia, hold treatment for now per ID and monitor. DEEPALI, unclear cause if from retention, renal consulted, will f/u trends  Plan for voiding trial later in week once cr improves consistently  Plan for delvalle removal on 9/18 given procedure toady and Cr normalized/ATN resolved so wait until tomrrow so no confounding factors

## 2024-09-17 NOTE — NURSING
Nurses Note -- 4 Eyes      9/17/2024   5:42 PM      Skin assessed during: Daily Assessment      [x] No Altered Skin Integrity Present    []Prevention Measures Documented      [] Yes- Altered Skin Integrity Present or Discovered   [] LDA Added if Not in Epic (Describe Wound)   [] New Altered Skin Integrity was Present on Admit and Documented in LDA   [] Wound Image Taken    Wound Care Consulted? No    Attending Nurse:  FRANCHESKA Olvera    Second RN/Staff Member:   NIEVES Nath

## 2024-09-17 NOTE — PLAN OF CARE
Recommendations    When medically feasible, continue diabetic diet  When medically feasible, continue Boost Breeze & boost GC BID as tolerated  If pt not consuming all 4 protein shakes, discontinue least desired one (preferably boost breeze)  Continue Abhijit BID  If additional protein needed, add Prosource once daily for (16 grams of protein)   RD following    Goals: Meet % een/epn by next RD f/u  Nutrition Goal Status: new  Communication of RD Recs: other (comment) (poc)

## 2024-09-17 NOTE — PROGRESS NOTES
Tristin Medel - Surgery ([facility])  [facility]  Progress Note    Patient Name: Cortes Schroeder  MRN: [id number]  Patient Class: IP- Inpatient   Admission Date: 9/6/2024  Length of Stay: 11 days  Attending Physician: Maria Del Rosario Medina MD  Primary Care Provider: Andrew Sinclair Jr., MD        Subjective:     Principal Problem:Surgical wound breakdown        HPI:  Cortes Schroeder is a 64 y.o. M with PMHx of anxiety, T2DM, GERD, HLD, HTN who presented to ED for AMS. He had a fall in July that resulted in R trimalleolar fracture and L distal radius fracture. He had external fixation on 07/18 and then ORIF on 07/26 with Dr. Liz. He was prescribed clinda 300 mg on 08/28 for a ten day course. Patient was doing well when he acutely became altered and not acting like himself a few hours ago. Patient family member drove him here from [facility] for ortho consult. R medial ankle wound dehisced with redness and swelling around it. No CPM fever, cough, N/V/D or seizure.    In ED: T max 99.1. SIRS 2/4 with WBC 18 and . CMP notable for Na 127, Cr 1.7, . Phos 1.9. .3. BNP 73. Trop neg. A1c 8.5. R tib fib XR notes There are 2 abandoned anterior-posteriorly oriented pin tracks in the right mid tibial shaft.  On AP views, each of these pin tracks demonstrates a small faint internal density, possibly representing a sequestrum. Ortho and endocrine consulted. Given IV vanc and IV clindamycin. Given 2.7L IVFs. Admitted to hospital medicine for sepsis 2/2 infected hardware.     Overview/Hospital Course:  Cortes Schroeder is a 65 yo M admitted to hospital medicine for sepsis 2/2 R ankle infection s/p ORIF on 07/26. Started on IV vanc and cefepime. Given IVFs. Brought to OR with ortho on 09/06 for irrigation debridement of RLE. Endocrine following for BG. Was on vanc and zosyn. Echo without concern for vegetations. 2/2 Blood cultures and 2/2 repeats with Staph aureus. Wound cultures with Staph aureus and  Group B strep. Will follow cultures. ID consulted as suspect patient will need long course of abx; now changing to continuous oxacillin. CTA chest negative for PE, but notes small area of ground glass changes to RUL. Patient now with cough. Adding doxy for possible PNA. Ortho repeated I&D on 09/09. WBC remains elevated, but fever has resolved. Plastics consulted for flap eval. CTA RLE with moderate atherosclerosis and multifocal high grade stenosis throughout R calf arteries. Vascular surgery consulted per plastics recs as they would like to pre-optimiize blood flow prior to any free flap or pedicled propellar flap. RD consulted for malnutrition. Now with DEEPALI, but likely 2/2 infection and multiple images requiring contrast. Will need PT/OT consult prior to discharge.     Interval History: he was feeling well this morning with roommate hugo at bedside. Cr 1.3 and much improved and at baseline now so going for angiogram this AM with vascular and NPO now. He was using jane per ortho recs but he reports he was told too much sugar in it and to hold off on it and ortho aware as dr mena at bedside as well for exam. Boost glucose control ordered. Wbc 14 still. ID recs for oxacillin with plans for oral meds long term if hardware retained and likely will have some hardware retained as when I talked to dr velez this weekend reported that some of hardware was unlikely to be removed right now but has some further surgery he wants to do on medial ankle pendnig the potential flap if able to do so/pending angiogram and so forth and patient and roomamte aware of this. PT/OT consulted and NWB to the surgical ankle. He is anxiuos to go home but concern may need placement again given IV antibitoics + deconditioning from hospital stay.  Suspect will need insulin titrarion in next few days as eating more (throat feels better again today with carate) as suspected esophagitis improves and is eating more with diet advancements. Plan  for soft/diabetic diet post op.w ill f/u angiogram and recs. Plan for vac change with ortho in OR concurrently today        Review of Systems   Constitutional:  Positive for activity change and fatigue. Negative for chills and fever.   Respiratory:  Positive for cough. Negative for chest tightness and shortness of breath.    Cardiovascular:  Negative for chest pain and leg swelling.   Gastrointestinal:  Positive for nausea. Negative for abdominal pain.   Musculoskeletal:  Positive for arthralgias.   Skin:  Positive for color change and wound.   Neurological:  Negative for dizziness and weakness.     Objective:     Vital Signs (Most Recent):  Temp: 97.9 °F (36.6 °C) (09/17/24 0801)  Pulse: 97 (09/17/24 0801)  Resp: 14 (09/17/24 0801)  BP: (!) 168/80 (09/17/24 0801)  SpO2: 96 % (09/17/24 0801) Vital Signs (24h Range):  Temp:  [97.9 °F (36.6 °C)-98.8 °F (37.1 °C)] 97.9 °F (36.6 °C)  Pulse:  [] 97  Resp:  [14-18] 14  SpO2:  [94 %-97 %] 96 %  BP: (132-168)/(66-80) 168/80     Weight: 93 kg (205 lb 0.4 oz)  Body mass index is 34.12 kg/m².    Intake/Output Summary (Last 24 hours) at 9/17/2024 1015  Last data filed at 9/17/2024 0559  Gross per 24 hour   Intake --   Output 1700 ml   Net -1700 ml         Physical Exam  Vitals and nursing note reviewed.   Constitutional:       Appearance: He is well-developed. He is obese. He is ill-appearing.      Comments: At baseline mental status without return of confusion. Off oxygen   Eyes:      Pupils: Pupils are equal, round, and reactive to light.   Cardiovascular:      Rate and Rhythm: Regular rhythm. Tachycardia present.      Comments: HR 90s  Pulmonary:      Effort: Pulmonary effort is normal.      Breath sounds: Rales (bibasilar) present.      Comments: Off oxygen. Speaking in full sentenecs without stopping for dyspnea. No crackles. No increased RR on exam. Breathing comfortably  Abdominal:      Palpations: Abdomen is soft.      Tenderness: There is no abdominal tenderness.    Musculoskeletal:         General: No tenderness.      Comments: Bandages in place. Wound vac with dark red output.   Skin:     General: Skin is warm and dry.      Comments: C/d/I dressing to R ankle with wound vac.    Neurological:      Mental Status: He is alert and oriented to person, place, and time.      Comments: Close to mental status baseline, much improved   Psychiatric:         Behavior: Behavior normal.             Significant Labs: All pertinent labs within the past 24 hours have been reviewed.  CBC:   Recent Labs   Lab 09/16/24  0510 09/17/24  0533   WBC 14.14* 14.74*   HGB 8.4* 8.5*   HCT 25.7* 26.1*   * 726*     CMP:   Recent Labs   Lab 09/16/24  0510 09/17/24  0533   * 135*   K 4.1 4.2    105   CO2 18* 21*   * 225*   BUN 34* 28*   CREATININE 1.8* 1.3   CALCIUM 8.1* 8.1*   PROT 5.9* 6.1   ALBUMIN 1.3* 1.4*   BILITOT 0.4 0.4   ALKPHOS 267* 243*   AST 24 21   ALT 6* 6*   ANIONGAP 9 9       Significant Imaging: I have reviewed all pertinent imaging results/findings within the past 24 hours.    Assessment/Plan:      * Surgical wound breakdown  Closed trimalleolar fracture of right ankle s/p ORIF in July  63 yo M presenting with AMS and worsening redness, swelling and pain to R ankle.     - continue oxacillin  - Ortho consulted:   - taken to OR 09/06 for debridement of surgical wound with wound vac placed. CX with group B strep and staph.   - Repeat I&D of R medial ankle on 09/09   - recommending plastics eval for free flap- this is pending angiogram.  Wound vac in place- vac change 9/17.  - follow wound cultures -- Staph aureus and GBS  - follow blood cultures -- Staph aureus.   - ID following:  - Continue IV oxacillin 12 g q 24 hours continuous infusion.  Monitor liver enzymes.  Plan for orals if hardware retained once completed.  - Anticipate 6 weeks of IV antibiotics from date of most recent washout.- tentative end date 10/21    - Ordered Hep B serologies before tentative  discontinuation of Truvada, and addition of adjunctive rifampin for biofilm penetration of retained hardware   - Anticipate long term oral  antibiotic suppression following IV abx   - Will follow.    - Plastic consulted:   - CTA reviewed, given numerous areas of high grade stenosis, will need Vascular consult for possible angiogram to pre-optimiize blood flow prior to any free flap or pedicled propellar flap. Angiogram on hold for DEEPALI which has resolved now so angiogram 9/17 pending  - continue optimization of BG, endocrinology on board  - ID on board, continue  IV abx.  - orthopedics on board to assess for source control, possible hardware removal. Vac change 9/17  - malnutition: will need optimization prior to OR. Prealbumin and transferrin low on last check.   - Plan: after medically optimized, plan for flap closure of wound.   - Vascular surgery consulted; appreciate recs . Angiogram aborted 9/12 for mental status as well as 9/13 for DEEPALI. Will replan once Cr improves- now 9/17    Hyperphosphatemia  Binder started 9/14 as elevated from DEEPALI and stopped 9/16 as deepali better as is phos      Dysphagia  Coughing on exam witnesseed 9/13 with water and some secretions in airway on CT, speech eval placed and rec continue liquids at this time  Protnoix started as some reflux may be contributing, and doxy stopped 9/14 in case contributing also. Still some scratchinses, using spray to throat on 9/15, speech to reeval 9/16 on Monday  Requested soft diet as hates the broth, upgraded 9/16 and did well, likely esophagitis main issue speech suspects      Constipation  Had issue last admit with prolonged constipation requiring enemas  -on bowel regimen  -has some bowel gas seen throughout CT abdomen, will giev suppository 9/13 to stay ahead and BM reported and 2 BMs 9/14      Bacteriuria  Seen on UA after delvalle for retention, per ID hold on teratment now as unclear if symptoms, reports 1 day of urine burning this week  -will f/u cx  and has no growth      Airway malacia  Seen on CT, unclear cause as no hx of prologned intubation, etc.  -continu supportiv care, nebs, IS, humidified oxygen      Acute retention of urine  Required delvalle 9/12 for retenetion, per nursing had had issues on/off the last few adys and had immediate return of >300 cc once placed and very concentrated dark urine. Urien with sediment now and darker yellow on exam 9/13. Questionable infectious process with bacerueia, hold treatment for now per ID and monitor. DEEPALI, unclear cause if from retention, renal consulted, will f/u trends  Plan for voiding trial later in week once cr improves consistently  Plan for delvalle removal on 9/18 given procedure toady and Cr normalized/ATN resolved so wait until tomrrow so no confounding factors      Acute metabolic encephalopathy  Likely from meds versed, fentanyl for anioggram that was aborted on 9/12 due to change in mental status with poor clearance with DEEPALI as change in metnal status drastically after this was given 9/12, CT head wnl, ammonia/lactate, ck okay  Much improving mental status 9/13. Watch closely      Leukocytosis  Starting to improve 9/13 and now 15 on 9/15--> now 14      Sinus tachycardia  Suspect from volume down, IVF with improvement from 110s to 90s on 9/13      PAD (peripheral artery disease)  Workup with vascular now, THEO, high grade stenosis on CTA, possible aniogram pending cr improvement      Acute blood loss anemia  Anemia is likely due to acute blood loss which was from surgery . Most recent hemoglobin and hematocrit are listed below.  Recent Labs     09/10/24  0439 09/11/24  1041 09/12/24  0537   HGB 8.4* 9.0* 8.5*   HCT 25.9* 27.1* 26.0*     Plan  - Monitor serial CBC: Daily  - Transfuse PRBC if patient becomes hemodynamically unstable, symptomatic or H/H drops below 7/21.  - Patient has not received any PRBC transfusions to date  - Patient's anemia is currently stable  -     Cough  Covering doxy for URI, on  "mucinex, mostly dry but now with some scant white/clear sputum on 9./13 and sputum resolved now and doxy stopped 9/;14 per ID  Mucinex stopped 9/16 as he reports making him nauseated, changed back to pill form      Transaminitis  - liver enzymes up and down, highest ast at 155, between   Now at 96-80---49 and now normalized  - will hold truvada for now  - monitor with daily CMP    On pre-exposure prophylaxis for HIV  - holding truvada for transaminitis     MSSA bacteremia  - 2/2 blood cx with Staph aureus. 2/2 repeats positive. Repeats pending but NGTD  - Follow repeat blood cx  - ID following: start continuous oxacillin   - echo without concern for vegetations     Sepsis  This patient does have evidence of infective focus  My overall impression is sepsis.  Source: Skin and Soft Tissue (location ankle)  Antibiotics given-   Antibiotics (72h ago, onward)      Start     Stop Route Frequency Ordered    09/09/24 1730  oxacillin 12 g in  mL CONTINUOUS INFUSION         -- IV Every 24 hours (non-standard times) 09/09/24 1630          Latest lactate reviewed-  No results for input(s): "LACTATE", "POCLAC" in the last 72 hours.    Organ dysfunction indicated by Acute kidney injury and Encephalopathy    Fluid challenge Actual Body weight- Patient will receive 30ml/kg actual body weight to calculate fluid bolus for treatment of septic shock.     Post- resuscitation assessment No - Post resuscitation assessment not needed     Will Not start Pressors-   Source control achieved by: see above  -secondary to surgical wound infeection in right ankle, with 9/6 wound Cx with group B strep and staph with bacteremia with 9/7 and 9/6  blood with staph with 9/8 and 9/10 blood Cx NGTD  ID following. Echo without signs of vegtations. On oxacillin now. Wbc still higher at 22, and ID following, procal 2 on 9/12-->1 on 9/13. CRp improved to 235. Wbc starting to improve at 18 now. .doxy stopped given no PNA signs on CT. Reached out " to ortho on 9/14 regarding ankle as no obvious other source of elevated wbc per discussion with ID to see if any plans to reassess wound under vac, last op note had necrosis and no obvious purulence. Wbc improving 9/15 at 15 now->14  -ID reports plan is oxacillin + orals after if hardware retained. Full final recs pending.    Hyponatremia  Hyponatremia is likely due to Dehydration/hypovolemia. The patient's most recent sodium results are listed below.  Recent Labs     09/11/24  1041 09/12/24  0538 09/13/24  0244   * 131* 131*       Plan  - Correct the sodium by 4-6mEq in 24 hours.   - Obtain the following studies: Urine sodium, urine osmolality, serum osmolality.  - Will treat the hyponatremia with IV fluids as follows: NS  - Monitor sodium Daily.   - Patient hyponatremia is similar to prev day, osm low normal ranges, urine osm pending  Last admit had similar low and improved with volume    Uncontrolled diabetes mellitus with hyperglycemia  Patient's FSGs are not controlled on current hypoglycemics.   Last A1c reviewed-   Lab Results   Component Value Date    LABA1C 10.8 (H) 08/15/2016    HGBA1C 8.5 (H) 09/06/2024     Most recent fingerstick glucose reviewed-   Recent Labs   Lab 09/16/24  1113 09/16/24  1521 09/16/24  2119   POCTGLUCOSE 203* 267* 257*       Current correctional scale  Low  Maintain anti-hyperglycemic dose as follows-   Antihyperglycemics (From admission, onward)      Start     Stop Route Frequency Ordered    09/17/24 2100  insulin glargine U-100 (Lantus) pen 25 Units         -- SubQ Nightly 09/17/24 0815    09/17/24 1130  insulin aspart U-100 pen 4-8 Units         -- SubQ 3 times daily with meals 09/17/24 0815    09/13/24 0830  insulin aspart U-100 pen 0-10 Units         -- SubQ Every 4 hours PRN 09/13/24 0731           - Endocrine consulted:  - At this time, recommend that the patient remain off of his pump since he cannot be controlled in the Auto mode. Patient does not interact with pump  frequently and relies on the auto mode algorithm.   - Lantus (Insulin Glargine) 35   - Novolog (Insulin Aspart) 9 units TIDWM (20% decrease given post-prandial BG below goal) (Hold if NPO) and prn for BG excursions MDC SSI (150/25)   - hold oral antihyperglycemics during hospitalization   - needs better BG control for optimal wound healing   Doses decreased on 9/12 for hypoglycemia this AM with endo managing and hypoglycemia again 9/13 in early AM overnight prior and endo adjusting further.   9/17: 220 this AM, suspect will start going up given advancing diet and more intake and endo adjusting    Closed trimalleolar fracture of right ankle  S/p ORIF 07/2024  Ortho consulted   - see surgical wound breakdown above  NWB    DEEPALI (acute kidney injury)  DEEPALI is likely due to pre-renal azotemia due to dehydration. Baseline creatinine is  1 . Most recent creatinine and eGFR are listed below.  Recent Labs     09/15/24  0241 09/16/24  0510 09/17/24  0533   CREATININE 2.7* 1.8* 1.3   EGFRNORACEVR 25.7* 41.8* >60.0        Plan  - DEEPALI is worsening  - Avoid nephrotoxins and renally dose meds for GFR listed above  - Monitor urine output, serial BMP, and adjust therapy as needed  - baseline 1.6--2.1--2.6 now. EF on prev echo 70% so signs of volume depletion, as well as hyponatremia and tachycardia. Resume IVF 9/12   Renal consulted, rec stop IVF 9/13, ATN on urine microscopy with casts, secondary to sepsis/contrast likely combination and cr not at plateau yet with Cr 3.3 on 9/14 and supportive care in interim.  Improving now at 2.7 on 9/15 with output picking up at 1.3 L.  And Cr now 1.9 and output 1.8L.  Cr 1.3 now on 9/17, angiogram today    GERD (gastroesophageal reflux disease)  Suspected gerd + esophagitis as cause for swallowing issues and speech feels this is most likely cause. From liquids to soft diet on 9/17 and tolerating well with improvements as well with carafate addition      Acute hypoxemic respiratory  failure  Patient with Hypoxic Respiratory failure which is Acute.  he is not on home oxygen. Supplemental oxygen was provided and noted-      .   Signs/symptoms of respiratory failure include- tachypnea and lethargy. Contributing diagnoses includes - Obesity Hypoventilation and Pneumonia Labs and images were reviewed. Patient Has not had a recent ABG. Will treat underlying causes and adjust management of respiratory failure as follows-     - ABG on 9/12 without major abnormality, P02 [id number], [id number], continue oxygen now, CT chest showing tracheomalachia/hyperinflation lungs. Has no hx of excessive intubation or lung dx known to him. Using IS on exam 9/13 and speaking in full sentences on exam, with small amount of clear/white sputum. No obvious signs of volume overload, some cough on exam and keep on liquid diet per speech recs. Stop doxy on 9/14 as no signs of PNA on exam and could be contributor to some of dysphagia per ID. Wean oxygen as tolerates, sputum resolved per patient. Breathing comfortable on exam. On 2L now on 9/15 and sats high 90s  On/off oxygen since 9/16, has been off in AMs yes and today for exams          - CTA ordered by ortho/anesthesia prior to I&D. Negative for PE, but small ground glass area to RUL representing infectious vs. Inflammatory process.  - doxy added for pneumonia coverage.  - mucinex BID, acapella, and nebulized saline . Has IS at bedside on exam 9/12  - prn duo nebs   9/8: no signs of overload. Left Ventricle: The left ventricle is normal in size. Normal wall thickness. There is normal systolic function with a visually estimated ejection fraction of 65 - 70%. There is normal diastolic function.    Right Ventricle: Normal right ventricular cavity size. Wall thickness is normal. Systolic function is normal.    IVC/SVC: Normal venous pressure at 3 mmHg.    No evidence of intracardiac mass or vegetation.    Metabolic acidosis  Likely secondary to DEEPALI  Start Na bicarb on  "9/12      Uncontrolled type 2 diabetes mellitus with diabetic polyneuropathy, with long-term current use of insulin  Patient's FSGs are uncontrolled due to hyperglycemia on current medication regimen.  Last A1c reviewed-   Lab Results   Component Value Date    LABA1C 10.8 (H) 08/15/2016    HGBA1C 9.9 (H) 07/18/2024     Most recent fingerstick glucose reviewed- No results for input(s): "POCTGLUCOSE" in the last 24 hours.  Current correctional scale  Low  Maintain anti-hyperglycemic dose as follows-   Antihyperglycemics (From admission, onward)      Start     Stop Route Frequency Ordered    09/06/24 0506  insulin aspart U-100 pen 0-5 Units         -- SubQ Every 6 hours PRN 09/06/24 0406          Hold Oral hypoglycemics while patient is in the hospital.      VTE Risk Mitigation (From admission, onward)           Ordered     enoxaparin injection 30 mg  Daily         09/13/24 0757     IP VTE HIGH RISK PATIENT  Once         09/06/24 1735                    Discharge Planning   JAYLEEN: 9/18/2024     Code Status: Full Code   Is the patient medically ready for discharge?:     Reason for patient still in hospital (select all that apply): Patient trending condition  Discharge Plan A: Home Health                  Maria Del Rosario Medina MD  Department of Hospital Medicine   [facility] - Surgery (2nd Fl)    "

## 2024-09-17 NOTE — SUBJECTIVE & OBJECTIVE
Interval History: he was feeling well this morning with roommate hugo at bedside. Cr 1.3 and much improved and at baseline now so going for angiogram this AM with vascular and NPO now. He was using jane per ortho recs but he reports he was told too much sugar in it and to hold off on it and ortho aware as dr mena at bedside as well for exam. Boost glucose control ordered. Wbc 14 still. ID recs for oxacillin with plans for oral meds long term if hardware retained and likely will have some hardware retained as when I talked to dr velez this weekend reported that some of hardware was unlikely to be removed right now but has some further surgery he wants to do on medial ankle pendnig the potential flap if able to do so/pending angiogram and so forth and patient and roomamte aware of this. PT/OT consulted and NWB to the surgical ankle. He is anxiuos to go home but concern may need placement again given IV antibitoics + deconditioning from hospital stay.  Suspect will need insulin titrarion in next few days as eating more (throat feels better again today with carate) as suspected esophagitis improves and is eating more with diet advancements. Plan for soft/diabetic diet post op.w ill f/u angiogram and recs. Plan for vac change with ortho in OR concurrently today        Review of Systems   Constitutional:  Positive for activity change and fatigue. Negative for chills and fever.   Respiratory:  Positive for cough. Negative for chest tightness and shortness of breath.    Cardiovascular:  Negative for chest pain and leg swelling.   Gastrointestinal:  Positive for nausea. Negative for abdominal pain.   Musculoskeletal:  Positive for arthralgias.   Skin:  Positive for color change and wound.   Neurological:  Negative for dizziness and weakness.     Objective:     Vital Signs (Most Recent):  Temp: 97.9 °F (36.6 °C) (09/17/24 0801)  Pulse: 97 (09/17/24 0801)  Resp: 14 (09/17/24 0801)  BP: (!) 168/80 (09/17/24 0801)  SpO2: 96  % (09/17/24 0801) Vital Signs (24h Range):  Temp:  [97.9 °F (36.6 °C)-98.8 °F (37.1 °C)] 97.9 °F (36.6 °C)  Pulse:  [] 97  Resp:  [14-18] 14  SpO2:  [94 %-97 %] 96 %  BP: (132-168)/(66-80) 168/80     Weight: 93 kg (205 lb 0.4 oz)  Body mass index is 34.12 kg/m².    Intake/Output Summary (Last 24 hours) at 9/17/2024 1015  Last data filed at 9/17/2024 0559  Gross per 24 hour   Intake --   Output 1700 ml   Net -1700 ml         Physical Exam  Vitals and nursing note reviewed.   Constitutional:       Appearance: He is well-developed. He is obese. He is ill-appearing.      Comments: At baseline mental status without return of confusion. Off oxygen   Eyes:      Pupils: Pupils are equal, round, and reactive to light.   Cardiovascular:      Rate and Rhythm: Regular rhythm. Tachycardia present.      Comments: HR 90s  Pulmonary:      Effort: Pulmonary effort is normal.      Breath sounds: Rales (bibasilar) present.      Comments: Off oxygen. Speaking in full sentenecs without stopping for dyspnea. No crackles. No increased RR on exam. Breathing comfortably  Abdominal:      Palpations: Abdomen is soft.      Tenderness: There is no abdominal tenderness.   Musculoskeletal:         General: No tenderness.      Comments: Bandages in place. Wound vac with dark red output.   Skin:     General: Skin is warm and dry.      Comments: C/d/I dressing to R ankle with wound vac.    Neurological:      Mental Status: He is alert and oriented to person, place, and time.      Comments: Close to mental status baseline, much improved   Psychiatric:         Behavior: Behavior normal.             Significant Labs: All pertinent labs within the past 24 hours have been reviewed.  CBC:   Recent Labs   Lab 09/16/24  0510 09/17/24  0533   WBC 14.14* 14.74*   HGB 8.4* 8.5*   HCT 25.7* 26.1*   * 726*     CMP:   Recent Labs   Lab 09/16/24 0510 09/17/24  0533   * 135*   K 4.1 4.2    105   CO2 18* 21*   * 225*   BUN 34* 28*    CREATININE 1.8* 1.3   CALCIUM 8.1* 8.1*   PROT 5.9* 6.1   ALBUMIN 1.3* 1.4*   BILITOT 0.4 0.4   ALKPHOS 267* 243*   AST 24 21   ALT 6* 6*   ANIONGAP 9 9       Significant Imaging: I have reviewed all pertinent imaging results/findings within the past 24 hours.

## 2024-09-17 NOTE — ASSESSMENT & PLAN NOTE
Patient with Hypoxic Respiratory failure which is Acute.  he is not on home oxygen. Supplemental oxygen was provided and noted-      .   Signs/symptoms of respiratory failure include- tachypnea and lethargy. Contributing diagnoses includes - Obesity Hypoventilation and Pneumonia Labs and images were reviewed. Patient Has not had a recent ABG. Will treat underlying causes and adjust management of respiratory failure as follows-     - ABG on 9/12 without major abnormality, P02 89, oz1660, continue oxygen now, CT chest showing tracheomalachia/hyperinflation lungs. Has no hx of excessive intubation or lung dx known to him. Using IS on exam 9/13 and speaking in full sentences on exam, with small amount of clear/white sputum. No obvious signs of volume overload, some cough on exam and keep on liquid diet per speech recs. Stop doxy on 9/14 as no signs of PNA on exam and could be contributor to some of dysphagia per ID. Wean oxygen as tolerates, sputum resolved per patient. Breathing comfortable on exam. On 2L now on 9/15 and sats high 90s  On/off oxygen since 9/16, has been off in AMs yes and today for exams          - CTA ordered by ortho/anesthesia prior to I&D. Negative for PE, but small ground glass area to RUL representing infectious vs. Inflammatory process.  - doxy added for pneumonia coverage.  - mucinex BID, acapella, and nebulized saline . Has IS at bedside on exam 9/12  - prn duo nebs   9/8: no signs of overload. Left Ventricle: The left ventricle is normal in size. Normal wall thickness. There is normal systolic function with a visually estimated ejection fraction of 65 - 70%. There is normal diastolic function.    Right Ventricle: Normal right ventricular cavity size. Wall thickness is normal. Systolic function is normal.    IVC/SVC: Normal venous pressure at 3 mmHg.    No evidence of intracardiac mass or vegetation.

## 2024-09-17 NOTE — PROGRESS NOTES
[facility]  Orthopedics  Progress Note    Attg Note:  Patient seen and examined.  I agree with the resident's assessment and plan.  To OR today with vascular surgery for balloon angioplasty.  We will plan on wound VAC change at that same time.    Moe Liz MD      Patient Name: Cortes Schroeder  MRN: [id number]  Admission Date: 9/6/2024  Hospital Length of Stay: 11 days  Attending Provider: Maria Del Rosario Medina MD  Primary Care Provider: Andrew Sinclair Jr., MD  Follow-up For: Procedure(s) (LRB):  Angiogram, Lower Arterial, Unilateral (Right)    Post-Operative Day: 5 Days Post-Op  Subjective:     Principal Problem:Surgical wound breakdown    Principal Orthopedic Problem: s/p I&D and wound vac placement on 09/06    Interval History: Plan for vascular angio today. NPO since midnight. Reiterated plan with patient that we will be present to change medial wound vac today. Cr continues to improve.        Review of patient's allergies indicates:   Allergen Reactions    Invokana [canagliflozin] Anaphylaxis    Percocet [oxycodone-acetaminophen] Nausea Only and Hallucinations    Biaxin [clarithromycin]     Hydrocodone Other (See Comments)     Dizzy/nausea/hallucinations    Sulfa (sulfonamide antibiotics) Nausea Only and Rash       Current Facility-Administered Medications   Medication    acetaminophen tablet 500 mg    albuterol-ipratropium 2.5 mg-0.5 mg/3 mL nebulizer solution 3 mL    amLODIPine tablet 10 mg    calcium carbonate 200 mg calcium (500 mg) chewable tablet 1,000 mg    dextrose 10% bolus 125 mL 125 mL    dextrose 10% bolus 125 mL 125 mL    dextrose 10% bolus 250 mL 250 mL    dextrose 10% bolus 250 mL 250 mL    enoxaparin injection 30 mg    glucagon (human recombinant) injection 1 mg    glucose chewable tablet 16 g    glucose chewable tablet 24 g    guaiFENesin 12 hr tablet 600 mg    hydrALAZINE tablet 25 mg    insulin aspart U-100 pen 0-10 Units    insulin aspart U-100 pen 4-8 Units    insulin  "glargine U-100 (Lantus) pen 25 Units    methocarbamoL tablet 500 mg    morphine injection 2 mg    morphine injection 2 mg    morphine injection 4 mg    nortriptyline capsule 50 mg    ondansetron disintegrating tablet 8 mg    oxacillin 12 g in  mL CONTINUOUS INFUSION    pantoprazole EC tablet 40 mg    polyethylene glycol packet 17 g    senna tablet 8.6 mg    sodium bicarbonate tablet 1,300 mg    sucralfate 100 mg/mL suspension 1 g     Objective:     Vital Signs (Most Recent):  Temp: 97.9 °F (36.6 °C) (09/17/24 0801)  Pulse: 97 (09/17/24 0801)  Resp: 14 (09/17/24 0801)  BP: (!) 168/80 (09/17/24 0801)  SpO2: 96 % (09/17/24 0801) Vital Signs (24h Range):  Temp:  [97.9 °F (36.6 °C)-98.8 °F (37.1 °C)] 97.9 °F (36.6 °C)  Pulse:  [] 97  Resp:  [14-18] 14  SpO2:  [94 %-97 %] 96 %  BP: (132-168)/(66-80) 168/80     Weight: 93 kg (205 lb 0.4 oz)  Height: 5' 5" (165.1 cm)  Body mass index is 34.12 kg/m².      Intake/Output Summary (Last 24 hours) at 9/17/2024 0848  Last data filed at 9/17/2024 0559  Gross per 24 hour   Intake --   Output 1700 ml   Net -1700 ml        Ortho/SPM Exam     AAOx4  NAD  Tachycardic  No increased WOB    RLE  Splint C/D/I   Wound vac to good suction  Compartments soft/compressible  [place]   Active toe dorsiflexion & plantarflexion  WWP. Brisk cap refill      Significant Labs:     Recent Results (from the past 336 hour(s))   Basic metabolic panel    Collection Time: 09/07/24  5:19 AM   Result Value Ref Range    Sodium 132 (L) 136 - 145 mmol/L    Potassium 4.0 3.5 - 5.1 mmol/L    Chloride 104 95 - 110 mmol/L    CO2 18 (L) 23 - 29 mmol/L    BUN 23 8 - 23 mg/dL    Creatinine 1.2 0.5 - 1.4 mg/dL    Calcium 8.5 (L) 8.7 - 10.5 mg/dL    Anion Gap 10 8 - 16 mmol/L         Significant Imaging: I have reviewed and interpreted all pertinent imaging results/findings.  CTA RLE: Multifocal high-grade stenoses throughout the right calf arteries. No arterial occlusion.    CTA chest: No large central PE " identified  Assessment/Plan:     * Surgical wound breakdown  Cortes Schroeder is a 64 y.o. male with PMH of DM, HTN  and right trimalleolar ankle fracture status post ex fix on 07/18 with subsequent definitive fixation on 07/26 admitted with right ankle wound dehiscence in the setting of uncontrolled diabetes.      He is s/p I&D of R ankle 09/06. Repeat I&D R medial ankle 09/09. To OR today with vascular for angiography and medial ankle wound vac exchange.     Labs: Cr improving  Diet: NPO   Pain control: multimodal regimen  PT/OT:  NWB RLE   DVT PPx: Lovenox 30 renally dosed   Abx:  oxacillin continuous; ID following   Cultures:  ankle cx staph +    Dispo: Vascular angio today + medial ankle vac exchange                 Artur Galvan MD  Orthopedics  [facility] - Surgery

## 2024-09-17 NOTE — PROGRESS NOTES
[facility]  Endocrinology  Progress Note    Admit Date: 9/6/2024     Reason for Consult: Management of T2DM, Hyperglycemia      Surgical Procedure and Date: S/P irrigation debridement of RLE on 09/06/2024    Diabetes diagnosis year: 1995     Home Diabetes Medications:    Humalog via OmniPod insulin pump  Mounjaro 10 mg weekly     Current pump settings:  Basal 12 am to 2.3 and 6 am to 1.55  ICR to 3 at 12 am, 2 at 4 pm  ISF to 1:20   AIT 3 hrs  Target 110-120        Patient had anaphylaxis reaction to SGLT2 inhibitors specifically Invokana     How often checking glucose at home? Dexcom G6   BG readings on regimen: Average 210's per Dexcom  Hypoglycemia on the regimen?  Yes  Missed doses on regimen?  No     Diabetes Complications include:     Hyperglycemia and Diabetic peripheral neuropathy         Complicating diabetes co morbidities:   HLD, HTN, Obesity         HPI: 64 y.o. male presents to the ED w/ complaint of altered mental status. Hx of R ankle fracture with surgery over a month ago. Patient now presents with sepsis 2/2 R ankle infection s/p ORIF on 07/26. Started on IV vanc and cefepime. Given IVFs. Blood cultures pending. Brought to OR with ortho on 09/06 for irrigation debridement of RLE. Endocrine following for BG and type 2 diabetes.     Lab Results   Component Value Date    LABA1C 10.8 (H) 08/15/2016    HGBA1C 8.5 (H) 09/06/2024         Interval HPI:   Overnight events: No acute events overnight. Patient in room 542/542 A. Blood glucose worsening. BG above goal on current insulin regimen (SSI, prandial, and basal insulin ). Steroid use- None. 5 Days Post-Op  Renal function- Normal   Vasopressors-  None       Endocrine will continue to follow and manage insulin orders inpatient.         Diet NPO Except for: Sips with Medication     Eating:   NPO  Nausea: No  Hypoglycemia and intervention: No  Fever: No  TPN and/or TF: No      BP (!) 168/80 (BP Location: Right arm, Patient Position: Lying)   Pulse  "97   Temp 97.9 °F (36.6 °C) (Oral)   Resp 14   Ht 5' 5" (1.651 m)   Wt 93 kg (205 lb 0.4 oz)   SpO2 96%   BMI 34.12 kg/m²     Labs Reviewed and Include    Recent Labs   Lab 09/17/24  0533   *   CALCIUM 8.1*   ALBUMIN 1.4*   PROT 6.1   *   K 4.2   CO2 21*      BUN 28*   CREATININE 1.3   ALKPHOS 243*   ALT 6*   AST 21   BILITOT 0.4     Lab Results   Component Value Date    WBC 14.74 (H) 09/17/2024    HGB 8.5 (L) 09/17/2024    HCT 26.1 (L) 09/17/2024    MCV 86 09/17/2024     (H) 09/17/2024     No results for input(s): "TSH", "FREET4" in the last 168 hours.  Lab Results   Component Value Date    HGBA1C 8.5 (H) 09/06/2024       Nutritional status:   Body mass index is 34.12 kg/m².  Lab Results   Component Value Date    ALBUMIN 1.4 (L) 09/17/2024    ALBUMIN 1.3 (L) 09/16/2024    ALBUMIN 1.3 (L) 09/15/2024     Lab Results   Component Value Date    PREALBUMIN 3 (L) 09/06/2024    PREALBUMIN 13 (L) 07/18/2024       Estimated Creatinine Clearance: 61 mL/min (based on SCr of 1.3 mg/dL).    Accu-Checks  Recent Labs     09/15/24  0723 09/15/24  1124 09/15/24  1532 09/15/24  2105 09/16/24  0002 09/16/24  0516 09/16/24  0735 09/16/24  1113 09/16/24  1521 09/16/24  2119   POCTGLUCOSE 152* 155* 224* 209* 192* 198* 208* 203* 267* 257*       Current Medications and/or Treatments Impacting Glycemic Control  Immunotherapy:    Immunosuppressants       None          Steroids:   Hormones (From admission, onward)      None          Pressors:    Autonomic Drugs (From admission, onward)      None          Hyperglycemia/Diabetes Medications:   Antihyperglycemics (From admission, onward)      Start     Stop Route Frequency Ordered    09/17/24 2100  insulin glargine U-100 (Lantus) pen 25 Units         -- SubQ Nightly 09/17/24 0815    09/17/24 1130  insulin aspart U-100 pen 4-8 Units         -- SubQ 3 times daily with meals 09/17/24 0815    09/13/24 0830  insulin aspart U-100 pen 0-10 Units         -- SubQ Every 4 " hours PRN 09/13/24 0731            ASSESSMENT and PLAN    Renal/  DEEPALI (acute kidney injury)  Titrate insulin slowly to avoid hypoglycemia as the risk of hypoglycemia increases with decreased creatinine clearance.  Estimated Creatinine Clearance: 61 mL/min (based on SCr of 1.3 mg/dL).        Endocrine  Uncontrolled diabetes mellitus with hyperglycemia  Endocrinology consulted for BG management.   BG goal 140-180    - Lantus (Insulin Glargine 24 units nightly (20% increase due to fasting BG above goal)   - Novolog 4-8 with meals (20% increase due to prandial blood glucose  above goal)  - [facility] SSI (150/25)  - BG checks q4hr  - Hypoglycemia protocol in place    ** Please notify Endocrine for any change and/or advance in diet**  ** Please call Endocrine for any BG related issues **    Discharge Planning:   TBD. Please notify endocrinology prior to discharge.        Orthopedic  * Surgical wound breakdown  Optimize BG control to improve wound healing  Managed per primary team               Brandyn Malin, DNP, FNP  Endocrinology  [facility] - Surgery

## 2024-09-17 NOTE — OP NOTE
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  He was found to have severe tibial disease and plan is for possible rotational versus free flap closure of his medial ankle wound.  Because of the evidence of significant peripheral vascular disease we will plan for right lower extremity angiography with possible endovascular intervention to optimize perfusion to the wound to improve wound healing and to possibly allow for flap closure of the wound.     Description of the procedure:  After informed consent was obtained the patient was taken to the operating room in supine position. Appropriate hemodynamic monitors were put in place by the nurse. The left groin was prepped and draped in normal sterile fashion. A time-out was performed to confirm the appropriate procedure, positioning and laterality.  Using ultrasound and fluoroscopic guidance we obtained percutaneous access the left common femoral artery using a micropuncture access kit. Through a series of wire and sheath exchanges, a 5 Bulgarian sheath was advanced over a stiff angled glide wire into the common femoral artery. The dilator was removed and the sheath flushed briskly with heparinized saline. An omniflush catheter was advanced over a stiff angled Glidewire to distal aorta.   A glide wire was advanced up and over the  bifurcation and the catheter behind it.  The catheter was parked in the common femoral artery on the right to perform an angiogram of the right lower extremity.  Right lower extremity angiography showed widely patent common femoral and deep femoral and superficial femoral arteries. A stiff angled glide wire was then advanced to the above knee popliteal artery. The short 5F sheath was exchanged out over the wire and a 70cm 5F Heriberto sheath was advanced up and over the bifurcation. 8000 units of heparin was administered. The distal end of the sheath was parked in the above knee popliteal artery.  Next we performed angiography of the remainder of the right lower extremity.  This showed widely patent popliteal artery.  The anterior tibial artery was had diffuse disease with multiple areas of 99% stenosis throughout and an area of chronic total occlusion distally with reconstitution at the ankle.  The TP trunk was widely patent.  With the posterior tibial artery was occluded 1 cm beyond its origin and reconstituted at the ankle.  There was single-vessel runoff via the peroneal artery which was widely patent without evidence of stenosis.  Based on these findings we felt intervention was indicated.      We then used a 0.018 glidewire advantage and 018 seeker catheter to cross the occlusion in the posterior tibial artery.  We confirmed intraluminal access beyond the area of occlusion with angiography through the catheter.  We then replaced the wire with a V18 wire.  We then exchanged out the catheter and advanced a 2 x 220mm balloon over the wire and performed balloon angioplasty of the posterior tibial artery from the ankle up to the proximal posterior tibial artery.  We inflated the balloon for 2 minutes.  We then deflated the balloon and retracted it cephalad and reinflated the balloon to treat the remainder of the posterior tibial artery for 2 minutes.  The balloon was then deflated and removed over the wire.  We then  repeated angiography which showed brisk flow through the posterior tibial artery to the foot with less than 10% residual stenosis.  We then removed the V18 wire and readvanced the 018 glidewire advantage along with the 018 seeker catheter into the anterior tibial artery.  We used this to cross the areas of stenosis and occlusion in the anterior tibial artery.  Once the catheter was parked at the ankle we then removed the wire and confirmed that we had intraluminal access with angiography through the catheter.  We then advanced the V18 wire through the catheter and parked it in the dorsalis pedis artery.  We then advanced the 2 x 220 mm balloon over the wire and performed balloon angioplasty of the mid to distal anterior tibial artery inflating the balloon to rated burst pressure for 2 minutes.  We then deflated the balloon and retracted it cephalad to and reinflated the balloon treat the remainder of the anterior tibial artery stenosis for 2min.  The balloon was then deflated and removed over the wire.  A completion angiogram was performed through the sheath which showed brisk flow through all 3 tibial arteries down to the foot.  There was approximately 30% residual stenosis in some areas of the anterior tibial artery due to dense calcification.  The posterior tibial artery had brisk flow with less than 10% residual stenosis.  There was brisk flow through the peroneal artery with no evidence of stenosis.  There was no evidence of embolization.   No of protamine was administered.  The long sheath was exchanged over a glide wire for a short 5F Mongolian sheath.  The access site was then closed with a 5 Botswanan Mynx and 3 minutes of continuous manual pressure was held.  The patient tolerated the procedure well.  The access site was dressed with 4 x 4 gauze and Tegaderm.  At this point Dr. Liz came into the room to perform irrigation of the right medial ankle wound.  The patient tolerated this well.  The patient was  transported to the PACU for recovery and bedrest.    EBL: 10cc     Complications: None    GINA Morton II, MD, VI  Vascular Surgery  [facility]

## 2024-09-17 NOTE — SUBJECTIVE & OBJECTIVE
Medications:  Continuous Infusions:  Scheduled Meds:   amLODIPine  10 mg Oral Daily    enoxaparin  30 mg Subcutaneous Daily    guaiFENesin  600 mg Oral BID    insulin aspart U-100  3-6 Units Subcutaneous TIDWM    insulin glargine U-100  20 Units Subcutaneous QHS    morphine  2 mg Intravenous Once    nortriptyline  50 mg Oral Nightly    oxacillin 12 g in  mL CONTINUOUS INFUSION  12 g Intravenous Q24H    pantoprazole  40 mg Oral Daily    polyethylene glycol  17 g Oral BID    senna  8.6 mg Oral BID    sodium bicarbonate  1,300 mg Oral TID    sucralfate  1 g Oral Q6H     PRN Meds:  Current Facility-Administered Medications:     acetaminophen, 500 mg, Oral, Q8H PRN    albuterol-ipratropium, 3 mL, Nebulization, Q4H PRN    calcium carbonate, 1,000 mg, Oral, TID PRN    dextrose 10%, 12.5 g, Intravenous, PRN    dextrose 10%, 12.5 g, Intravenous, PRN    dextrose 10%, 25 g, Intravenous, PRN    dextrose 10%, 25 g, Intravenous, PRN    glucagon (human recombinant), 1 mg, Intramuscular, PRN    glucose, 16 g, Oral, PRN    glucose, 24 g, Oral, PRN    hydrALAZINE, 25 mg, Oral, Q8H PRN    insulin aspart U-100, 0-10 Units, Subcutaneous, Q4H PRN    methocarbamoL, 500 mg, Oral, TID PRN    morphine, 2 mg, Intravenous, Q6H PRN    morphine, 4 mg, Intravenous, Q6H PRN    ondansetron, 8 mg, Oral, Q8H PRN     Objective:     Vital Signs (Most Recent):  Temp: 98.2 °F (36.8 °C) (09/17/24 0430)  Pulse: 98 (09/17/24 0430)  Resp: 18 (09/17/24 0430)  BP: (!) 161/77 (09/17/24 0430)  SpO2: (!) 94 % (09/17/24 0430) Vital Signs (24h Range):  Temp:  [97.9 °F (36.6 °C)-98.8 °F (37.1 °C)] 98.2 °F (36.8 °C)  Pulse:  [] 98  Resp:  [17-18] 18  SpO2:  [94 %-98 %] 94 %  BP: (132-188)/(66-84) 161/77          Physical Exam  Eyes:      Pupils: Pupils are equal, round, and reactive to light.   Cardiovascular:      Rate and Rhythm: Normal rate.   Pulmonary:      Effort: Pulmonary effort is normal.   Abdominal:      General: Abdomen is flat.   Skin:      General: Skin is warm.      Comments: RLE surgical dressings, wound vac   Neurological:      Mental Status: He is alert.          Significant Labs:  CBC:   Recent Labs   Lab 09/16/24  0510   WBC 14.14*   RBC 3.04*   HGB 8.4*   HCT 25.7*   *   MCV 85   MCH 27.6   MCHC 32.7     CMP:   Recent Labs   Lab 09/17/24  0533   *   CALCIUM 8.1*   ALBUMIN 1.4*   PROT 6.1   *   K 4.2   CO2 21*      BUN 28*   CREATININE 1.3   ALKPHOS 243*   ALT 6*   AST 21   BILITOT 0.4       Significant Diagnostics:  I have reviewed all pertinent imaging results/findings within the past 24 hours.

## 2024-09-17 NOTE — PROGRESS NOTES
Nephrology Chart Review      Intake/Output Summary (Last 24 hours) at 9/17/2024 0852  Last data filed at 9/17/2024 0559  Gross per 24 hour   Intake --   Output 1700 ml   Net -1700 ml       Vitals:    09/17/24 0022 09/17/24 0305 09/17/24 0430 09/17/24 0801   BP: (!) 143/66  (!) 161/77 (!) 168/80   BP Location: Left arm  Right arm Right arm   Patient Position: Lying  Lying Lying   Pulse: 102 96 98 97   Resp: 18  18 14   Temp: 98.7 °F (37.1 °C)  98.2 °F (36.8 °C) 97.9 °F (36.6 °C)   TempSrc: Oral  Oral Oral   SpO2: 95%  (!) 94% 96%   Weight:       Height:           Recent Labs   Lab 09/15/24  0241 09/16/24  0510 09/17/24  0533   * 131* 135*   K 4.4 4.1 4.2    104 105   CO2 16* 18* 21*   BUN 43* 34* 28*   CREATININE 2.7* 1.8* 1.3   CALCIUM 7.9* 8.1* 8.1*   PHOS 5.1* 3.5 3.3     Renal function continues to improve. No indication for RRT. Continue current management.     Thank you for involving us in the care of Cortes Schroeder. Please call with any additional questions, concerns or changes in the patient's clinical status.  We will sign off.

## 2024-09-17 NOTE — ASSESSMENT & PLAN NOTE
Cortes Schroeder is a 64 y.o. male with PMH of DM, HTN  and right trimalleolar ankle fracture status post ex fix on 07/18 with subsequent definitive fixation on 07/26 admitted with right ankle wound dehiscence in the setting of uncontrolled diabetes.      He is s/p I&D of R ankle 09/06. Repeat I&D R medial ankle 09/09. To OR today with vascular for angiography and medial ankle wound vac exchange.     Labs: Cr improving  Diet: NPO   Pain control: multimodal regimen  PT/OT:  NWB RLE   DVT PPx: Lovenox 30 renally dosed   Abx:  oxacillin continuous; ID following   Cultures:  ankle cx staph +    Dispo: Vascular angio today + medial ankle vac exchange

## 2024-09-17 NOTE — ASSESSMENT & PLAN NOTE
Suspected gerd + esophagitis as cause for swallowing issues and speech feels this is most likely cause. From liquids to soft diet on 9/17 and tolerating well with improvements as well with carafate addition

## 2024-09-17 NOTE — ANESTHESIA PREPROCEDURE EVALUATION
09/17/2024  [facility]  Anesthesia Pre-Operative Evaluation     Patient Name: Cortes Schroeder  YOB: 1960  MRN: [id number]  [facility]: [id number]       Admit Date: 9/6/2024   Admit Team: [facility]  Hospital Day: 12  Date of Procedure: 9/17/2024  Anesthesia: Local MAC Procedure: Procedure(s) (LRB):  Angiogram Extremity Unilateral (Right)  Pre-Operative Diagnosis: Dehiscence of operative wound, initial encounter [T81.31XA]  Proceduralist:Surgeons and Role:     * GINA Morton II, MD - Primary  Code Status: Full Code   Advanced Directive: Received  Isolation Precautions: No active isolations  Capacity: Full capacity     SUBJECTIVE:   Cortes Schroeder is a 64 y.o. male who  has a past medical history of Anxiety (12/18/2012), Back pain (12/18/2012), Cataract, Chronic pain syndrome (04/24/2016), Diabetes type 2, controlled (02/20/2016), Diabetic retinopathy, DM (diabetes mellitus) (12/18/2012), DM (diabetes mellitus), type 2, uncontrolled (11/16/2013), Gastroesophageal reflux disease without esophagitis (02/20/2016), Hyperlipidemia (12/18/2012), Insomnia (08/07/2014), and Neuropathy (11/16/2013).  Mr. Schroeder is a 64yoM w/hx of T2DM, anxiety, GERD, HLD, HTN, previous syncopal episodes with recent ankle fracture and distal left radius fracture after a ground level fall s/p R ankle ex-fix (7/18) and L DR ORIF(7/19) who presented to the hospital for AMS. He was admitted to the hospital on 9/6 for sepsis secondary to surgical wound breakdown now s/p excisional debridement and irrigation (9/6) and and excisional and non-excisional debridement of tissue necrosis of the right ankle (9/9). His hospitalization has been complicated by staph aureus bacteremia and group B strep. Followed by ID and on IV Oxacillin, previously on Zosyn and Vancomycin.     BUN/Cr 37/3.0, up from his baseline Cr  ~1.0. Labs are notable for WBC 18.63, H/H 7.9/23.6, , Na 131, Bicarb 16, , Serum osm 284, Urine Cr 129, Urine Osm 600, Urine Na 20, CK WNL. Nephrology was consulted for DEEPALI.       Hospital LOS: 11 days  ICU LOS: Patient does not have an ICU stay during this admission.    he has a current medication list which includes the following long-term medication(s): aspirin, dexcom g6 sensor, dexcom g6 transmitter, duloxetine, emtricitabine-tenofovir 200-300 mg, fish oil-omega-3 fatty acids, gabapentin, humalog u-100 insulin, losartan, nortriptyline, rosuvastatin, and [DISCONTINUED] insulin aspart u-100.     ALLERGIES:     Review of patient's allergies indicates:   Allergen Reactions    Invokana [canagliflozin] Anaphylaxis    Percocet [oxycodone-acetaminophen] Nausea Only and Hallucinations    Biaxin [clarithromycin]     Hydrocodone Other (See Comments)     Dizzy/nausea/hallucinations    Sulfa (sulfonamide antibiotics) Nausea Only and Rash     LDA:   AIRWAY:         [unfilled]     Lines/Drains/Airways       Drain  Duration                  Urethral Catheter 09/12/24 1400 4 days              Peripheral Intravenous Line  Duration                  Peripheral IV - Single Lumen 09/10/24 1552 20 G 1 3/4 in Yes Anterior;Left Upper Arm 6 days                   Anesthesia Evaluation      Airway   Mallampati: III  Neck ROM: Normal ROM  Dental      Pulmonary    Cardiovascular   (+) hypertension    Neuro/Psych    (+) neuromuscular disease    GI/Hepatic/Renal    (+) GERD, chronic renal disease    Endo/Other    (+) diabetes mellitus, arthritis  Abdominal                    MEDICATIONS:     Current Outpatient Medications on File Prior to Encounter   Medication Sig Dispense Refill Last Dose    acetaminophen (TYLENOL) 325 MG tablet Take 2 tablets (650 mg total) by mouth every 6 (six) hours.       aspirin (ECOTRIN) 81 MG EC tablet Take 1 tablet (81 mg total) by mouth 2 (two) times a day. End date sept 20, 2024        atorvastatin (LIPITOR) 40 MG tablet Take 40 mg by mouth once daily.       blood-glucose sensor (DEXCOM G6 SENSOR) Omaira Change sensor every 10 days 3 each PRN     blood-glucose transmitter (DEXCOM G6 TRANSMITTER) Omaira Change transmitter every 3 months 1 each PRN     celecoxib (CELEBREX) 200 MG capsule Take 1 capsule (200 mg total) by mouth once daily.       cyanocobalamin (VITAMIN B-12) 1000 MCG tablet Take 1,000 mcg by mouth once daily.       DULoxetine (CYMBALTA) 60 MG capsule Take 1 capsule (60 mg total) by mouth once daily. 90 capsule 0     emtricitabine-tenofovir 200-300 mg (TRUVADA) 200-300 mg Tab Take 1 tablet by mouth once daily. 30 tablet 0     enoxaparin (LOVENOX) 40 mg/0.4 mL Syrg Inject 40 mg into the skin Daily.       fish oil-omega-3 fatty acids 300-1,000 mg capsule Take 1 capsule by mouth 2 (two) times daily.       gabapentin (NEURONTIN) 300 MG capsule Take 2 capsules (600 mg total) by mouth once daily AND 4 capsules (1,200 mg total) every evening.       HUMALOG U-100 INSULIN 100 unit/mL injection 1:20 U PRN high blood sugar and snacks. Correction dose- Enter carb coverage intake upon administration  Target number 120 Carbohydrate coverage #1 1:2 Carbohydrate coverage #1 time [phone] Carbohydrate coverage #2 1:3 Carbohydrate coverage #2 time [phone] Carbohydrate coverage #3 Carbohydrate coverage #3 time Carbohydrate coverage #4 Carbohydrate coverage #4 time Sensitivity #1 1:20 Sensitivity #1 time [id number] Sensitivity #2 Sensitivity #2 time Sensitivity #3 Sensitivity #3 time Sensitivity #4 Sensitivity #4 time Sensitivity #5 Sensitivity #5 time Order Questions Question Answer Comment       50 U SQ continuous  Target number 120 Basal Rate #1 2.3 Basal rate #1 time [id number] Basal Rate #2 1.55 Basal rate #2 time 2053-8165 Basal rate #3 Basal rate #3 time Basal rate #4 Basal rate #4 time Basal rate #5 Basal rate #5 time       losartan (COZAAR) 25 MG tablet Take 1 tablet (25 mg total) by mouth once  daily.       magnesium oxide (MAG-OX) 400 mg (241.3 mg magnesium) tablet Take 0.5 tablets (200 mg total) by mouth once daily.       melatonin (MELATIN) 3 mg tablet Take 2 tablets (6 mg total) by mouth nightly as needed for Insomnia.       methocarbamoL (ROBAXIN) 500 MG Tab Take 1 tablet (500 mg total) by mouth 4 (four) times daily as needed (pain scale 4-7).       morphine (MSIR) 15 MG tablet Take 1 tablet (15 mg total) by mouth every 4 (four) hours as needed (pain scale 6-10).       MOUNJARO 10 mg/0.5 mL PnIj Inject 10 mg into the skin once a week. HOLD until all surgeries completed and out of rehab       nortriptyline (PAMELOR) 50 MG capsule Take 1 capsule (50 mg total) by mouth nightly. 90 capsule 0     ondansetron (ZOFRAN-ODT) 4 MG TbDL Take 2 tablets (8 mg total) by mouth every 6 (six) hours as needed (nausea).       polyethylene glycol (GLYCOLAX) 17 gram PwPk Take 17 g by mouth once daily.       rosuvastatin (CRESTOR) 10 MG tablet Take 10 mg by mouth every evening.       senna-docusate 8.6-50 mg (PERICOLACE) 8.6-50 mg per tablet Take 1 tablet by mouth 2 (two) times daily.       vitamin D 1000 units Tab Take 1,000 Units by mouth 2 (two) times daily.       [DISCONTINUED] insulin aspart U-100 (NOVOLOG U-100 INSULIN ASPART) 100 unit/mL injection Use in insulin pump. Max daily dose 150 units. 50 mL 5       Inpatient Medications:  Antibiotics (From admission, onward)      Start     Stop Route Frequency Ordered    09/09/24 1730  oxacillin 12 g in  mL CONTINUOUS INFUSION         -- IV Every 24 hours (non-standard times) 09/09/24 1630          VTE Risk Mitigation (From admission, onward)           Ordered     enoxaparin injection 30 mg  Daily         09/13/24 0757     IP VTE HIGH RISK PATIENT  Once         09/06/24 1735                   amLODIPine  10 mg Oral Daily    enoxaparin  30 mg Subcutaneous Daily    guaiFENesin  600 mg Oral BID    insulin aspart U-100  3-6 Units Subcutaneous TIDWM    insulin glargine  U-100  20 Units Subcutaneous QHS    morphine  2 mg Intravenous Once    nortriptyline  50 mg Oral Nightly    oxacillin 12 g in  mL CONTINUOUS INFUSION  12 g Intravenous Q24H    pantoprazole  40 mg Oral Daily    polyethylene glycol  17 g Oral BID    senna  8.6 mg Oral BID    sodium bicarbonate  1,300 mg Oral TID    sucralfate  1 g Oral Q6H       Current Facility-Administered Medications   Medication Dose Route Frequency Provider Last Rate Last Admin    acetaminophen tablet 500 mg  500 mg Oral Q8H PRN Maria Del Rosario Medina MD   500 mg at 09/15/24 0847    albuterol-ipratropium 2.5 mg-0.5 mg/3 mL nebulizer solution 3 mL  3 mL Nebulization Q4H PRN Artur Galvan MD   3 mL at 09/12/24 1621    amLODIPine tablet 10 mg  10 mg Oral Daily Lelia Masterson MD   10 mg at 09/16/24 0838    calcium carbonate 200 mg calcium (500 mg) chewable tablet 1,000 mg  1,000 mg Oral TID PRN SONA Price MD        dextrose 10% bolus 125 mL 125 mL  12.5 g Intravenous PRN Artur Galvan MD        dextrose 10% bolus 125 mL 125 mL  12.5 g Intravenous PRN Artur Galvan MD        dextrose 10% bolus 250 mL 250 mL  25 g Intravenous PRN Artur Galvan MD   Stopped at 09/12/24 2204    dextrose 10% bolus 250 mL 250 mL  25 g Intravenous PRN Artur Galvan MD        enoxaparin injection 30 mg  30 mg Subcutaneous Daily Maria Del Rosario Medina MD   30 mg at 09/16/24 1703    glucagon (human recombinant) injection 1 mg  1 mg Intramuscular PRN Artur Galvan MD        glucose chewable tablet 16 g  16 g Oral PRN Artur Galvan MD   16 g at 09/12/24 0730    glucose chewable tablet 24 g  24 g Oral PRN Artur Galvan MD        guaiFENesin 12 hr tablet 600 mg  600 mg Oral BID Maria Del Rosario Medina MD   600 mg at 09/16/24 2231    hydrALAZINE tablet 25 mg  25 mg Oral Q8H PRN Maria Del Rosario Medina MD   25 mg at 09/16/24 0525    insulin aspart U-100 pen 0-10 Units  0-10 Units Subcutaneous Q4H PRN Payton Vora PA-C   3 Units at 09/16/24 2305    insulin aspart U-100  pen 3-6 Units  3-6 Units Subcutaneous TIDWM Payton Vora PA-C        insulin glargine U-100 (Lantus) pen 20 Units  20 Units Subcutaneous QHS Payton Vora PA-C   20 Units at 09/16/24 2300    methocarbamoL tablet 500 mg  500 mg Oral TID PRN Maria Del Rosario Medina MD   500 mg at 09/15/24 0847    morphine injection 2 mg  2 mg Intravenous Q6H PRN Artur Galvan MD   2 mg at 09/16/24 1702    morphine injection 2 mg  2 mg Intravenous Once Artur Galvan MD        morphine injection 4 mg  4 mg Intravenous Q6H PRN Artur Galvan MD   4 mg at 09/11/24 1140    nortriptyline capsule 50 mg  50 mg Oral Nightly Jennifer Ochoa PA-C   50 mg at 09/16/24 2230    ondansetron disintegrating tablet 8 mg  8 mg Oral Q8H PRN Wen Bueno PA-C   8 mg at 09/13/24 1610    oxacillin 12 g in  mL CONTINUOUS INFUSION  12 g Intravenous Q24H Artur Galvan MD 20.8 mL/hr at 09/16/24 2310 12 g at 09/16/24 2310    pantoprazole EC tablet 40 mg  40 mg Oral Daily Maria Del Rosario Medina MD   40 mg at 09/16/24 0835    polyethylene glycol packet 17 g  17 g Oral BID Libia Mustafa PA-C   17 g at 09/15/24 2107    senna tablet 8.6 mg  8.6 mg Oral BID Libia Mustafa PA-C   8.6 mg at 09/16/24 0834    sodium bicarbonate tablet 1,300 mg  1,300 mg Oral TID Maria Del Rosario Medina MD   1,300 mg at 09/16/24 2230    sucralfate 100 mg/mL suspension 1 g  1 g Oral Q6H Maria Del Rosario Medina MD   1 g at 09/17/24 0525          History:     Active Hospital Problems    Diagnosis  POA    *Surgical wound breakdown [T81.31XA]  Yes    Hyperphosphatemia [E83.39]  No    Airway malacia [J39.8]  Yes    Bacteriuria [R82.71]  No    Constipation [K59.00]  No    Dysphagia [R13.10]  No    Acute blood loss anemia [D62]  No    PAD (peripheral artery disease) [I73.9]  Yes    Sinus tachycardia [R00.0]  Yes    Leukocytosis [D72.829]  Yes    Acute metabolic encephalopathy [G93.41]  No    Acute retention of urine [R33.8]  No    Cough [R05.9]  Yes    Thrombocytosis  [D75.839]  Yes    Transaminitis [R74.01]  Yes    MSSA bacteremia [R78.81, B95.61]  Yes    On pre-exposure prophylaxis for HIV [Z79.899]  Not Applicable    Sepsis [A41.9]  Yes    Surgical site infection [T81.49XA]  Yes    Closed trimalleolar fracture of right ankle [S82.851A]  Yes    Hyponatremia [E87.1]  Yes    Uncontrolled diabetes mellitus with hyperglycemia [E11.65]  Yes    DEEPALI (acute kidney injury) [N17.9]  Yes    GERD (gastroesophageal reflux disease) [K21.9]  Yes    Acute hypoxemic respiratory failure [J96.01]  Yes    Metabolic acidosis [E87.20]  Yes      Resolved Hospital Problems   No resolved problems to display.     Surgical History:    has a past surgical history that includes Back surgery (2003); Epidural steroid injection (N/A, 1/16/2019); Epidural steroid injection (N/A, 2/20/2019); Cataract extraction; injection, spine, lumbosacral, transforaminal approach (Bilateral, 6/14/2024); application, external fixation device, for ankle fracture (Right, 7/18/2024); closed reduction, fracture, ankle, trimalleolar (Right, 7/18/2024); Open reduction and internal fixation (ORIF) of fracture of distal radius (Left, 7/19/2024); Open reduction and internal fixation (ORIF) of injury of ankle (Right, 7/26/2024); Removal of external fixation device (Right, 7/26/2024); Fixation of syndesmosis of ankle (Right, 7/26/2024); Application of wound vacuum-assisted closure device (Right, 9/6/2024); irrigation and debridement (Right, 9/6/2024); Irrigation and debridement of lower extremity (Right, 9/9/2024); and Arthrotomy of ankle (Right, 9/9/2024).   Social History:    reports that he is not currently sexually active and has had partner(s) who are male. He reports using the following method of birth control/protection: Condom.  reports that he has never smoked. He has never used smokeless tobacco. He reports current alcohol use of about 1.0 standard drink of alcohol per week. He reports that he does not use drugs.    Vitals:     09/16/24 2311 09/17/24 0022 09/17/24 0305 09/17/24 0430   BP:  (!) 143/66  (!) 161/77   BP Location:  Left arm  Right arm   Patient Position:  Lying  Lying   Pulse: 106 102 96 98   Resp:  18  18   Temp:  37.1 °C (98.7 °F)  36.8 °C (98.2 °F)   TempSrc:  Oral  Oral   SpO2:  95%  (!) 94%   Weight:       Height:         Vital Signs Range (Last 24H):  Temp:  [36.6 °C (97.9 °F)-37.1 °C (98.8 °F)]   Pulse:  []   Resp:  [17-18]   BP: (132-188)/(66-84)   SpO2:  [94 %-98 %]     Body mass index is 34.12 kg/m².  Wt Readings from Last 4 Encounters:   09/06/24 93 kg (205 lb 0.4 oz)   07/26/24 93 kg (205 lb)   07/20/24 92.1 kg (203 lb)   05/11/22 90.8 kg (200 lb 2.8 oz)        Intake/Output - Last 3 Shifts         09/15 0700 09/16 0659 09/16 0700  09/17 0659 09/17 0700  09/18 0659    P.O. 550      Total Intake(mL/kg) 550 (5.9)      Urine (mL/kg/hr) 1800 (0.8) 1700 (0.8)     Other 100      Stool 0      Total Output 1900 1700     Net -1350 -1700            Stool Occurrence 2 x 1 x           Lab Results   Component Value Date    WBC 14.14 (H) 09/16/2024    HGB 8.4 (L) 09/16/2024    HCT 25.7 (L) 09/16/2024     (H) 09/16/2024     (L) 09/17/2024    K 4.2 09/17/2024     09/17/2024    CREATININE 1.3 09/17/2024    BUN 28 (H) 09/17/2024    CO2 21 (L) 09/17/2024     (H) 09/17/2024    CALCIUM 8.1 (L) 09/17/2024    MG 2.5 09/14/2024    PHOS 3.3 09/17/2024    ALKPHOS 243 (H) 09/17/2024    ALT 6 (L) 09/17/2024    AST 21 09/17/2024    ALBUMIN 1.4 (L) 09/17/2024    INR 1.0 09/06/2024    APTT 29.9 09/06/2024    HGBA1C 8.5 (H) 09/06/2024    MICROALBUR 48.0 04/27/2022     09/12/2024    TROPONINI <0.006 09/06/2024    BNP 97 09/06/2024     Recent Results (from the past 12 hour(s))   POCT glucose    Collection Time: 09/16/24  9:19 PM   Result Value Ref Range    POCT Glucose 257 (H) 70 - 110 mg/dL   Comprehensive metabolic panel    Collection Time: 09/17/24  5:33 AM   Result Value Ref Range    Sodium 135 (L) 136 -  145 mmol/L    Potassium 4.2 3.5 - 5.1 mmol/L    Chloride 105 95 - 110 mmol/L    CO2 21 (L) 23 - 29 mmol/L    Glucose 225 (H) 70 - 110 mg/dL    BUN 28 (H) 8 - 23 mg/dL    Creatinine 1.3 0.5 - 1.4 mg/dL    Calcium 8.1 (L) 8.7 - 10.5 mg/dL    Total Protein 6.1 6.0 - 8.4 g/dL    Albumin 1.4 (L) 3.5 - 5.2 g/dL    Total Bilirubin 0.4 0.1 - 1.0 mg/dL    Alkaline Phosphatase 243 (H) 55 - 135 U/L    AST 21 10 - 40 U/L    ALT 6 (L) 10 - 44 U/L    eGFR >60.0 >60 mL/min/1.73 m^2    Anion Gap 9 8 - 16 mmol/L   Phosphorus    Collection Time: 09/17/24  5:33 AM   Result Value Ref Range    Phosphorus 3.3 2.7 - 4.5 mg/dL     Recent Labs   Lab 09/14/24  0337 09/15/24  0241 09/16/24  0510 09/17/24  0533   WBC 18.70* 15.08* 14.14*  --    HGB 8.3* 8.6* 8.4*  --    HCT 24.4* 25.2* 25.7*  --    * 651* 694*  --    * 130* 131* 135*   K 4.1 4.4 4.1 4.2   CREATININE 3.3* 2.7* 1.8* 1.3   * 154* 187* 225*     No LMP for male patient.    EKG:   Results for orders placed or performed during the hospital encounter of 09/06/24   EKG 12-lead    Collection Time: 09/08/24  9:39 AM   Result Value Ref Range    QRS Duration 72 ms    [facility] QTC Calculation 399 ms    Narrative    Test Reason : R00.0,    Vent. Rate : 114 BPM     Atrial Rate : 114 BPM     P-R Int : 208 ms          QRS Dur : 072 ms      QT Int : 290 ms       P-R-T Axes : 040 -11 044 degrees     QTc Int : 399 ms    Sinus tachycardia  Inferior infarct ,age undetermined  Abnormal ECG  When compared with ECG of 06-SEP-2024 02:27,  Criteria for Inferior infarct is now Present  Confirmed by Jolie Weiss MD ([id number]) on 9/8/2024 12:06:07 PM    Referred By: [email or web address]   SELF           Confirmed By:Jolie Weiss MD     TTE:  Results for orders placed or performed during the hospital encounter of 09/06/24   Echo   Result Value Ref Range    RA Width 3.31 cm    LA volume (mod) 44.61 cm3    Left Atrium Major Axis 4.71 cm    Left Atrium Minor Axis 4.50 cm    RA Major Axis 4.19 cm    LV  "Diastolic Volume 55.80 mL    LV Systolic Volume 22.21 mL    PV Peak D Ryan 0.37 m/s    PV Peak S Ryan 0.61 m/s    MV Peak A Ryan 1.04 m/s    MV stenosis pressure 1/2 time 42.67 ms    MV Peak E Ryan 0.92 m/s    Ao VTI 20.14 cm    Ao peak ryan 1.31 m/s    LVOT peak VTI 22.91 cm    LVOT peak ryan 1.18 m/s    LVOT diameter 2.04 cm    MV "A" wave duration 5.14 msec    E wave deceleration time 147.14 msec    AV mean gradient 4 mmHg    RV- morales basal diam 2.4 cm    RV S' 16.55 cm/s    TAPSE 2.40 cm    LA size 3.00 cm    Ascending aorta 3.10 cm    STJ 3.04 cm    Sinus 3.29 cm    LVIDs 2.49 2.1 - 4.0 cm    Posterior Wall 0.87 0.6 - 1.1 cm    IVS 0.89 0.6 - 1.1 cm    LVIDd 3.64 3.5 - 6.0 cm    TDI LATERAL 0.11 m/s    LA WIDTH 3.76 cm    TDI SEPTAL 0.07 m/s    LV LATERAL E/E' RATIO 8.36 m/s    LV SEPTAL E/E' RATIO 13.14 m/s    FS 32 28 - 44 %    LA volume 44.13 cm3    LV mass 91.48 g    ZLVIDD -4.71     ZLVIDS -2.87     Left Ventricle Relative Wall Thickness 0.48 cm    AV valve area 3.72 cm²    AV Velocity Ratio 0.90     AV index (prosthetic) 1.14     MV valve area p 1/2 method 5.16 cm2    E/A ratio 0.88     Mean e' 0.09 m/s    Pulm vein S/D ratio 1.65     LVOT area 3.3 cm2    LVOT stroke volume 74.84 cm3    AV peak gradient 7 mmHg    E/E' ratio 10.22 m/s    LV Systolic Volume Index 11.1 mL/m2    LV Diastolic Volume Index 27.90 mL/m2    LA Volume Index 22.1 mL/m2    LV Mass Index 46 g/m2    LA Volume Index (Mod) 22.3 mL/m2    EUGENIO by Velocity Ratio 2.94 cm²    BSA 2.07 m2    Est. RA pres 3 mmHg    Narrative      Left Ventricle: The left ventricle is normal in size. Normal wall   thickness. There is normal systolic function with a visually estimated   ejection fraction of 65 - 70%. There is normal diastolic function.    Right Ventricle: Normal right ventricular cavity size. Wall thickness   is normal. Systolic function is normal.    IVC/SVC: Normal venous pressure at 3 mmHg.    No evidence of intracardiac mass or vegetation.       No " "results found. However, due to the size of the patient record, not all encounters were searched. Please check Results Review for a complete set of results.  ROBERTA:  No results found. However, due to the size of the patient record, not all encounters were searched. Please check Results Review for a complete set of results.  Stress Test:  No results found for this or any previous visit.     LHC:  Results for orders placed during the hospital encounter of 09/06/24    Cardiac catheterization    Narrative  Procedure performed in the Invasive Lab  - See Procedure Log link below for nursing documentation  - See OpNote on Surgeries Tab for physician findings  - See Imaging Tab for radiologist dictation     PFT:  No results found for: "FEV1", "FVC", "PXX8SQH", "TLC", "DLCO"           Pre-op Assessment    I have reviewed the Patient Summary Reports.     I have reviewed the Nursing Notes. I have reviewed the NPO Status.   I have reviewed the Medications.     Review of Systems  Anesthesia Hx:               Denies Personal Hx of Anesthesia complications.                    Cardiovascular:     Hypertension                                        Renal/:  Chronic Renal Disease                Hepatic/GI:     GERD             Musculoskeletal:  Arthritis               Neurological:    Neuromuscular Disease,                                   Endocrine:  Diabetes               Physical Exam  General: Alert, Cooperative, Oriented and Well nourished    Airway:  Mallampati: III / II  Mouth Opening: Normal  Neck ROM: Normal ROM        Anesthesia Plan  Type of Anesthesia, risks & benefits discussed:    Anesthesia Type: Gen ETT, MAC  Intra-op Monitoring Plan: Standard ASA Monitors  Post Op Pain Control Plan: multimodal analgesia  Induction:  IV  Airway Plan: Direct  Informed Consent: Informed consent signed with the Patient and all parties understand the risks and agree with anesthesia plan.  All questions answered.   ASA Score: 4  Day of " Surgery Review of History & Physical: H&P Update referred to the surgeon/provider.    Ready For Surgery From Anesthesia Perspective.     .

## 2024-09-18 PROBLEM — R82.71 BACTERIURIA: Status: RESOLVED | Noted: 2024-09-13 | Resolved: 2024-09-18

## 2024-09-18 PROBLEM — D72.829 LEUKOCYTOSIS: Status: RESOLVED | Noted: 2024-09-12 | Resolved: 2024-09-18

## 2024-09-18 PROBLEM — R05.9 COUGH: Status: RESOLVED | Noted: 2024-09-11 | Resolved: 2024-09-18

## 2024-09-18 PROBLEM — G93.41 ACUTE METABOLIC ENCEPHALOPATHY: Status: RESOLVED | Noted: 2024-09-12 | Resolved: 2024-09-18

## 2024-09-18 PROBLEM — A41.9 SEPSIS: Status: RESOLVED | Noted: 2024-09-06 | Resolved: 2024-09-18

## 2024-09-18 LAB
ALBUMIN SERPL BCP-MCNC: 1.4 G/DL (ref 3.5–5.2)
ALP SERPL-CCNC: 213 U/L (ref 55–135)
ALT SERPL W/O P-5'-P-CCNC: 5 U/L (ref 10–44)
ANION GAP SERPL CALC-SCNC: 9 MMOL/L (ref 8–16)
AST SERPL-CCNC: 20 U/L (ref 10–40)
BILIRUB SERPL-MCNC: 0.4 MG/DL (ref 0.1–1)
BUN SERPL-MCNC: 22 MG/DL (ref 8–23)
CALCIUM SERPL-MCNC: 7.8 MG/DL (ref 8.7–10.5)
CHLORIDE SERPL-SCNC: 105 MMOL/L (ref 95–110)
CO2 SERPL-SCNC: 20 MMOL/L (ref 23–29)
CREAT SERPL-MCNC: 1.2 MG/DL (ref 0.5–1.4)
ERYTHROCYTE [DISTWIDTH] IN BLOOD BY AUTOMATED COUNT: 15.9 % (ref 11.5–14.5)
EST. GFR  (NO RACE VARIABLE): >60 ML/MIN/1.73 M^2
GLUCOSE SERPL-MCNC: 218 MG/DL (ref 70–110)
HCT VFR BLD AUTO: 25.3 % (ref 40–54)
HGB BLD-MCNC: 8.4 G/DL (ref 14–18)
MCH RBC QN AUTO: 28.3 PG (ref 27–31)
MCHC RBC AUTO-ENTMCNC: 33.2 G/DL (ref 32–36)
MCV RBC AUTO: 85 FL (ref 82–98)
PHOSPHATE SERPL-MCNC: 3.4 MG/DL (ref 2.7–4.5)
PLATELET # BLD AUTO: 689 K/UL (ref 150–450)
PMV BLD AUTO: 9.4 FL (ref 9.2–12.9)
POCT GLUCOSE: 210 MG/DL (ref 70–110)
POCT GLUCOSE: 235 MG/DL (ref 70–110)
POCT GLUCOSE: 282 MG/DL (ref 70–110)
POTASSIUM SERPL-SCNC: 3.9 MMOL/L (ref 3.5–5.1)
PROT SERPL-MCNC: 6 G/DL (ref 6–8.4)
RBC # BLD AUTO: 2.97 M/UL (ref 4.6–6.2)
SODIUM SERPL-SCNC: 134 MMOL/L (ref 136–145)
WBC # BLD AUTO: 12.75 K/UL (ref 3.9–12.7)

## 2024-09-18 PROCEDURE — 97165 OT EVAL LOW COMPLEX 30 MIN: CPT

## 2024-09-18 PROCEDURE — 97530 THERAPEUTIC ACTIVITIES: CPT

## 2024-09-18 PROCEDURE — 36415 COLL VENOUS BLD VENIPUNCTURE: CPT | Performed by: HOSPITALIST

## 2024-09-18 PROCEDURE — 63600175 PHARM REV CODE 636 W HCPCS: Performed by: STUDENT IN AN ORGANIZED HEALTH CARE EDUCATION/TRAINING PROGRAM

## 2024-09-18 PROCEDURE — 25000003 PHARM REV CODE 250: Performed by: STUDENT IN AN ORGANIZED HEALTH CARE EDUCATION/TRAINING PROGRAM

## 2024-09-18 PROCEDURE — 80053 COMPREHEN METABOLIC PANEL: CPT | Performed by: HOSPITALIST

## 2024-09-18 PROCEDURE — 84100 ASSAY OF PHOSPHORUS: CPT | Performed by: HOSPITALIST

## 2024-09-18 PROCEDURE — 97535 SELF CARE MNGMENT TRAINING: CPT

## 2024-09-18 PROCEDURE — 63600175 PHARM REV CODE 636 W HCPCS: Performed by: HOSPITALIST

## 2024-09-18 PROCEDURE — 99232 SBSQ HOSP IP/OBS MODERATE 35: CPT | Mod: ,,, | Performed by: NURSE PRACTITIONER

## 2024-09-18 PROCEDURE — 25000003 PHARM REV CODE 250: Performed by: INTERNAL MEDICINE

## 2024-09-18 PROCEDURE — 85027 COMPLETE CBC AUTOMATED: CPT | Performed by: HOSPITALIST

## 2024-09-18 PROCEDURE — 21400001 HC TELEMETRY ROOM

## 2024-09-18 PROCEDURE — 92526 ORAL FUNCTION THERAPY: CPT

## 2024-09-18 PROCEDURE — 25000003 PHARM REV CODE 250: Performed by: HOSPITALIST

## 2024-09-18 PROCEDURE — 97116 GAIT TRAINING THERAPY: CPT

## 2024-09-18 PROCEDURE — 97161 PT EVAL LOW COMPLEX 20 MIN: CPT

## 2024-09-18 PROCEDURE — 25000003 PHARM REV CODE 250

## 2024-09-18 RX ORDER — ENOXAPARIN SODIUM 100 MG/ML
40 INJECTION SUBCUTANEOUS EVERY 24 HOURS
Status: DISCONTINUED | OUTPATIENT
Start: 2024-09-18 | End: 2024-09-30

## 2024-09-18 RX ORDER — LOSARTAN POTASSIUM 25 MG/1
25 TABLET ORAL DAILY
Status: DISCONTINUED | OUTPATIENT
Start: 2024-09-18 | End: 2024-09-19

## 2024-09-18 RX ORDER — INSULIN GLARGINE 100 [IU]/ML
28 INJECTION, SOLUTION SUBCUTANEOUS NIGHTLY
Status: DISCONTINUED | OUTPATIENT
Start: 2024-09-18 | End: 2024-09-19

## 2024-09-18 RX ADMIN — POLYETHYLENE GLYCOL 3350 17 G: 17 POWDER, FOR SOLUTION ORAL at 08:09

## 2024-09-18 RX ADMIN — INSULIN ASPART 6 UNITS: 100 INJECTION, SOLUTION INTRAVENOUS; SUBCUTANEOUS at 12:09

## 2024-09-18 RX ADMIN — OXACILLIN 12 G: 2 INJECTION, POWDER, FOR SOLUTION INTRAMUSCULAR; INTRAVENOUS at 02:09

## 2024-09-18 RX ADMIN — SUCRALFATE 1 G: 1 SUSPENSION ORAL at 05:09

## 2024-09-18 RX ADMIN — SODIUM BICARBONATE 650 MG TABLET 1300 MG: at 09:09

## 2024-09-18 RX ADMIN — INSULIN ASPART 4 UNITS: 100 INJECTION, SOLUTION INTRAVENOUS; SUBCUTANEOUS at 04:09

## 2024-09-18 RX ADMIN — PANTOPRAZOLE SODIUM 40 MG: 40 TABLET, DELAYED RELEASE ORAL at 09:09

## 2024-09-18 RX ADMIN — ENOXAPARIN SODIUM 40 MG: 40 INJECTION SUBCUTANEOUS at 05:09

## 2024-09-18 RX ADMIN — SODIUM BICARBONATE 650 MG TABLET 1300 MG: at 08:09

## 2024-09-18 RX ADMIN — NORTRIPTYLINE HYDROCHLORIDE 50 MG: 25 CAPSULE ORAL at 08:09

## 2024-09-18 RX ADMIN — SUCRALFATE 1 G: 1 SUSPENSION ORAL at 12:09

## 2024-09-18 RX ADMIN — SUCRALFATE 1 G: 1 SUSPENSION ORAL at 03:09

## 2024-09-18 RX ADMIN — LOSARTAN POTASSIUM 25 MG: 25 TABLET, FILM COATED ORAL at 09:09

## 2024-09-18 RX ADMIN — AMLODIPINE BESYLATE 10 MG: 10 TABLET ORAL at 09:09

## 2024-09-18 RX ADMIN — SENNOSIDES 8.6 MG: 8.6 TABLET, FILM COATED ORAL at 08:09

## 2024-09-18 RX ADMIN — ONDANSETRON 8 MG: 8 TABLET, ORALLY DISINTEGRATING ORAL at 10:09

## 2024-09-18 RX ADMIN — GUAIFENESIN 600 MG: 600 TABLET, EXTENDED RELEASE ORAL at 08:09

## 2024-09-18 NOTE — PROGRESS NOTES
Pharmacist Renal Dose Adjustment Note    Cortes Schroeder is a 64 y.o. male being treated with the medication Enoxaparin    Patient Data:    Vital Signs (Most Recent):  Temp: 98.5 °F (36.9 °C) (09/18/24 0751)  Pulse: 95 (09/18/24 0751)  Resp: 16 (09/18/24 0751)  BP: (!) 163/79 (09/18/24 0751)  SpO2: 96 % (09/18/24 0751) Vital Signs (72h Range):  Temp:  [97.2 °F (36.2 °C)-98.8 °F (37.1 °C)]   Pulse:  []   Resp:  [14-24]   BP: (132-188)/(57-84)   SpO2:  [94 %-100 %]      Recent Labs   Lab 09/16/24  0510 09/17/24  0533 09/18/24  0320   CREATININE 1.8* 1.3 1.2     Serum creatinine: 1.2 mg/dL 09/18/24 0320  Estimated creatinine clearance: 66 mL/min    Enoxaparin 30 mg daily will be changed to Enoxaparin 40 mg daily    Pharmacist's Name: Filemon Sherwood  Pharmacist's Extension: [phone]

## 2024-09-18 NOTE — PROGRESS NOTES
.Plastic and Reconstructive Surgery   Progress Note    Subjective:    Vasc angio yesterday  Cr stable  Pain controlled  Vac in place  Splint off       Objective:  Vital signs in last 24 hours:  Temp:  [97.5 °F (36.4 °C)-98.5 °F (36.9 °C)] 98.5 °F (36.9 °C)  Pulse:  [88-99] 95  Resp:  [16-24] 16  SpO2:  [94 %-100 %] 96 %  BP: (136-174)/(63-82) 163/79    Intake/Output last 3 shifts:  I/O last 3 completed shifts:  In: -   Out: 3300 [Urine:3200; Other:100]    Intake/Output this shift:  No intake/output data recorded.        Physical Exam:  VITAL SIGNS:   Vitals:    24 0322 24 0500 24 0751 24 0946   BP: (!) 172/75  (!) 163/79 (!) 163/79   BP Location: Right arm      Patient Position: Lying  Lying    Pulse: 97  95    Resp: 18  16    Temp: 97.8 °F (36.6 °C)  98.5 °F (36.9 °C)    TempSrc: Oral  Oral    SpO2: 95% 95% 96%    Weight:       Height:         TMAX: Temp (24hrs), Av °F (36.7 °C), Min:97.5 °F (36.4 °C), Max:98.5 °F (36.9 °C)      General: Alert; No acute distress  Cardiovascular: Regular rate   Respiratory: Normal respiratory effort. Chest rise symmetric.   Abdomen: Soft, nontender, nondistended  Extremity:  RLE vac in place with likely some revasc erythema, unable to appreciate palpable PT/DP, +doppler PT Signal, no DP signal found. +1 Pop. Cap refill on distal toes 2-3 secs, toe warm, L groin dressing   Neurologic: No focal deficit. Speech normal      Scheduled Medications amLODIPine, 10 mg, Daily  enoxaparin, 40 mg, Daily  guaiFENesin, 600 mg, BID  insulin aspart U-100, 4-8 Units, TIDWM  insulin glargine U-100, 28 Units, QHS  losartan, 25 mg, Daily  morphine, 2 mg, Once  nortriptyline, 50 mg, Nightly  oxacillin 12 g in  mL CONTINUOUS INFUSION, 12 g, Q24H  pantoprazole, 40 mg, Daily  polyethylene glycol, 17 g, BID  senna, 8.6 mg, BID  sodium bicarbonate, 1,300 mg, TID  sucralfate, 1 g, Q6H        PRN Medications     Current Facility-Administered Medications:     acetaminophen, 500  mg, Oral, Q8H PRN    albuterol-ipratropium, 3 mL, Nebulization, Q4H PRN    calcium carbonate, 1,000 mg, Oral, TID PRN    dextrose 10%, 12.5 g, Intravenous, PRN    dextrose 10%, 12.5 g, Intravenous, PRN    dextrose 10%, 25 g, Intravenous, PRN    dextrose 10%, 25 g, Intravenous, PRN    glucagon (human recombinant), 1 mg, Intramuscular, PRN    glucose, 16 g, Oral, PRN    glucose, 24 g, Oral, PRN    hydrALAZINE, 25 mg, Oral, Q8H PRN    insulin aspart U-100, 0-10 Units, Subcutaneous, Q4H PRN    methocarbamoL, 500 mg, Oral, TID PRN    morphine, 2 mg, Intravenous, Q6H PRN    morphine, 4 mg, Intravenous, Q6H PRN    ondansetron, 8 mg, Oral, Q8H PRN    Recent Labs:   Lab Results   Component Value Date    WBC 12.75 (H) 09/18/2024    HGB 8.4 (L) 09/18/2024    HCT 25.3 (L) 09/18/2024    MCV 85 09/18/2024     (H) 09/18/2024     Lab Results   Component Value Date     (H) 09/18/2024     (L) 09/18/2024    K 3.9 09/18/2024     09/18/2024    BUN 22 09/18/2024         Assessment:   64 y.o. y/o male s/p Procedure(s):  IRRIGATION AND DEBRIDEMENT, LOWER EXTREMITY - WITH WOUND VAC CHANGE, RIGHT ANKLE  ARTHROTOMY, ANKLE      1 Day Post-Op     64 y.o.male W/ right ankle post-surgical medial wound dehiscence and exposed hardware.     Now s/p PTA RLE PT/AT, completion angiogram showing 3 vessel run off, also washout and vac exchange    Plan  Will find time for OR for flap coverage.   - Ortho/Vasc/Endocrine/ID recs appreciated  - Continue to optimize nutrition   - Continue wound vac and Abx for now    Destin Enamorado MD  Plastic Surgery Fellow  09/18/2024

## 2024-09-18 NOTE — PLAN OF CARE
Problem: Adult Inpatient Plan of Care  Goal: Absence of Hospital-Acquired Illness or Injury  Outcome: Progressing  Goal: Optimal Comfort and Wellbeing  Outcome: Progressing  Goal: Readiness for Transition of Care  Outcome: Progressing     Problem: Fall Injury Risk  Goal: Absence of Fall and Fall-Related Injury  Outcome: Progressing     Problem: Sepsis/Septic Shock  Goal: Optimal Coping  Outcome: Progressing  Goal: Absence of Bleeding  Outcome: Progressing  Goal: Blood Glucose Level Within Targeted Range  Outcome: Progressing  Goal: Absence of Infection Signs and Symptoms  Outcome: Progressing  Goal: Optimal Nutrition Intake  Outcome: Progressing     Problem: Acute Kidney Injury/Impairment  Goal: Fluid and Electrolyte Balance  Outcome: Progressing  Goal: Improved Oral Intake  Outcome: Progressing  Goal: Effective Renal Function  Outcome: Progressing     Problem: Wound  Goal: Optimal Coping  Outcome: Progressing  Goal: Optimal Functional Ability  Outcome: Progressing  Goal: Absence of Infection Signs and Symptoms  Outcome: Progressing  Goal: Improved Oral Intake  Outcome: Progressing  Goal: Optimal Pain Control and Function  Outcome: Progressing  Goal: Skin Health and Integrity  Outcome: Progressing  Goal: Optimal Wound Healing  Outcome: Progressing     Problem: Diabetes Comorbidity  Goal: Blood Glucose Level Within Targeted Range  Outcome: Progressing     Problem: Skin Injury Risk Increased  Goal: Skin Health and Integrity  Outcome: Progressing     Problem: Infection  Goal: Absence of Infection Signs and Symptoms  Outcome: Progressing   Pt aaox4, pt pain well managed, Pt resting well without signs of distress or injuries. Wound vac is sealed and without leaks. Pt reports not having an appetite and eating less than 25% of his meals, discussed with Dr. Malin at bedside. Safety measures remain in place, bed in the lowest position with wheels locked, call light and personal belongings within reach. Continue plan of  care.

## 2024-09-18 NOTE — SUBJECTIVE & OBJECTIVE
"Interval HPI:   Overnight events: No acute events overnight. Patient in room 542/542 A. Blood glucose improving. BG at and above goal on current insulin regimen (SSI, prandial, and basal insulin ). Steroid use- None. 1 Day Post-Op  Renal function- Normal   Vasopressors-  None       Endocrine will continue to follow and manage insulin orders inpatient.         Diet diabetic Soft & Bite Sized (IDDSI Level 6); 2000 Calorie  Diet NPO Except for: Sips with Medication     Eatin%  Nausea: No  Hypoglycemia and intervention: No  Fever: No  TPN and/or TF: No      BP (!) 163/79 (Patient Position: Lying)   Pulse 95   Temp 98.5 °F (36.9 °C) (Oral)   Resp 16   Ht 5' 5" (1.651 m)   Wt 93 kg (205 lb 0.4 oz)   SpO2 96%   BMI 34.12 kg/m²     Labs Reviewed and Include    Recent Labs   Lab 24  0320   *   CALCIUM 7.8*   ALBUMIN 1.4*   PROT 6.0   *   K 3.9   CO2 20*      BUN 22   CREATININE 1.2   ALKPHOS 213*   ALT 5*   AST 20   BILITOT 0.4     Lab Results   Component Value Date    WBC 12.75 (H) 2024    HGB 8.4 (L) 2024    HCT 25.3 (L) 2024    MCV 85 2024     (H) 2024     No results for input(s): "TSH", "FREET4" in the last 168 hours.  Lab Results   Component Value Date    HGBA1C 8.5 (H) 2024       Nutritional status:   Body mass index is 34.12 kg/m².  Lab Results   Component Value Date    ALBUMIN 1.4 (L) 2024    ALBUMIN 1.4 (L) 2024    ALBUMIN 1.3 (L) 2024     Lab Results   Component Value Date    PREALBUMIN 3 (L) 2024    PREALBUMIN 13 (L) 2024       Estimated Creatinine Clearance: 66 mL/min (based on SCr of 1.2 mg/dL).    Accu-Checks  Recent Labs     24  0002 24  0516 24  0735 24  1113 24  1521 24  2119 24  0742 24  1329 24  2215 24  0743   POCTGLUCOSE 192* 198* 208* 203* 267* 257* 223* 177* 227* 210*       Current Medications and/or Treatments Impacting Glycemic " Control  Immunotherapy:    Immunosuppressants       None          Steroids:   Hormones (From admission, onward)      None          Pressors:    Autonomic Drugs (From admission, onward)      None          Hyperglycemia/Diabetes Medications:   Antihyperglycemics (From admission, onward)      Start     Stop Route Frequency Ordered    09/18/24 2100  insulin glargine U-100 (Lantus) pen 28 Units         -- SubQ Nightly 09/18/24 0902    09/17/24 1130  insulin aspart U-100 pen 4-8 Units         -- SubQ 3 times daily with meals 09/17/24 0815    09/13/24 0830  insulin aspart U-100 pen 0-10 Units         -- SubQ Every 4 hours PRN 09/13/24 0731

## 2024-09-18 NOTE — ASSESSMENT & PLAN NOTE
Workup with vascular now, THEO, high grade stenosis on CTA, aniogram with PT/AT stenosis and PTA on 9/17

## 2024-09-18 NOTE — NURSING
Nurses Note -- 4 Eyes      9/18/2024   8:00 AM      Skin assessed during: Daily Assessment      [x] No Altered Skin Integrity Present    []Prevention Measures Documented      [] Yes- Altered Skin Integrity Present or Discovered   [] LDA Added if Not in Epic (Describe Wound)   [] New Altered Skin Integrity was Present on Admit and Documented in LDA   [] Wound Image Taken    Wound Care Consulted? No    Attending Nurse:  FRANCHESKA Olvera    Second RN/Staff Member:   NIEVES Abbasi

## 2024-09-18 NOTE — ASSESSMENT & PLAN NOTE
64-year-old gentleman presenting with nonhealing wound of the right lower extremity.    - POD1 from RLE angiogram  - q4 neuro checks, good pulses on rounds  - Vascular surgery will continue to follow peripherally at this time

## 2024-09-18 NOTE — SUBJECTIVE & OBJECTIVE
Principal Problem:Surgical wound breakdown    Principal Orthopedic Problem: s/p I&D and wound vac placement on 09/06    Interval History: s/p angiography on 9/19. Resting comfortably today. Aware of update in plan to return to OR 9/20 for formal I&D with vac exchange. No other acute changes. Pulses to RLE remain dopplerable.       Review of patient's allergies indicates:   Allergen Reactions    Invokana [canagliflozin] Anaphylaxis    Percocet [oxycodone-acetaminophen] Nausea Only and Hallucinations    Biaxin [clarithromycin]     Hydrocodone Other (See Comments)     Dizzy/nausea/hallucinations    Sulfa (sulfonamide antibiotics) Nausea Only and Rash       Current Facility-Administered Medications   Medication    acetaminophen tablet 500 mg    albuterol-ipratropium 2.5 mg-0.5 mg/3 mL nebulizer solution 3 mL    amLODIPine tablet 10 mg    calcium carbonate 200 mg calcium (500 mg) chewable tablet 1,000 mg    dextrose 10% bolus 125 mL 125 mL    dextrose 10% bolus 125 mL 125 mL    dextrose 10% bolus 250 mL 250 mL    dextrose 10% bolus 250 mL 250 mL    enoxaparin injection 40 mg    glucagon (human recombinant) injection 1 mg    glucose chewable tablet 16 g    glucose chewable tablet 24 g    guaiFENesin 12 hr tablet 600 mg    hydrALAZINE tablet 25 mg    insulin aspart U-100 pen 0-10 Units    insulin aspart U-100 pen 4-8 Units    insulin glargine U-100 (Lantus) pen 28 Units    losartan tablet 25 mg    methocarbamoL tablet 500 mg    morphine injection 2 mg    morphine injection 2 mg    morphine injection 4 mg    nortriptyline capsule 50 mg    ondansetron disintegrating tablet 8 mg    oxacillin 12 g in  mL CONTINUOUS INFUSION    pantoprazole EC tablet 40 mg    polyethylene glycol packet 17 g    senna tablet 8.6 mg    sodium bicarbonate tablet 1,300 mg    sucralfate 100 mg/mL suspension 1 g     Objective:     Vital Signs (Most Recent):  Temp: 97.8 °F (36.6 °C) (09/18/24 1202)  Pulse: 96 (09/18/24 1202)  Resp: 16 (09/18/24  "1202)  BP: (!) 195/86 (09/18/24 1202)  SpO2: 96 % (09/18/24 1202) Vital Signs (24h Range):  Temp:  [97.7 °F (36.5 °C)-98.5 °F (36.9 °C)] 97.8 °F (36.6 °C)  Pulse:  [88-99] 96  Resp:  [16-22] 16  SpO2:  [94 %-96 %] 96 %  BP: (136-195)/(72-86) 195/86     Weight: 93 kg (205 lb 0.4 oz)  Height: 5' 5" (165.1 cm)  Body mass index is 34.12 kg/m².      Intake/Output Summary (Last 24 hours) at 9/18/2024 1402  Last data filed at 9/18/2024 1353  Gross per 24 hour   Intake 360 ml   Output 1600 ml   Net -1240 ml        Ortho/SPM Exam     AAOx4  NAD  Tachycardic  No increased WOB    RLE  Medial wound vac to good suction  Lateral side with fibrinous exudate and weeping ecchymosis distally  Compartments soft/compressible  Sensation diminished (at baseline)   Active toe dorsiflexion & plantarflexion  WWP. Brisk cap refill      Significant Labs:     Recent Results (from the past 336 hour(s))   Basic metabolic panel    Collection Time: 09/07/24  5:19 AM   Result Value Ref Range    Sodium 132 (L) 136 - 145 mmol/L    Potassium 4.0 3.5 - 5.1 mmol/L    Chloride 104 95 - 110 mmol/L    CO2 18 (L) 23 - 29 mmol/L    BUN 23 8 - 23 mg/dL    Creatinine 1.2 0.5 - 1.4 mg/dL    Calcium 8.5 (L) 8.7 - 10.5 mg/dL    Anion Gap 10 8 - 16 mmol/L         Significant Imaging: I have reviewed and interpreted all pertinent imaging results/findings.  CTA RLE: Multifocal high-grade stenoses throughout the right calf arteries. No arterial occlusion.    CTA chest: No large central PE identified  "

## 2024-09-18 NOTE — PROGRESS NOTES
[facility]  Orthopedics  Progress Note    Patient Name: Cortes Schroeder  MRN: [id number]  Admission Date: 9/6/2024  Hospital Length of Stay: 12 days  Attending Provider: Maria Del Rosario Medina MD  Primary Care Provider: Andrew Sinclair Jr., MD  Follow-up For: Procedure(s) (LRB):  Angiogram Extremity Unilateral (Right)  REPLACEMENT, WOUND VAC (Right)    Post-Operative Day: 1 Day Post-Op  Subjective:     Principal Problem:Surgical wound breakdown    Principal Orthopedic Problem: s/p I&D and wound vac placement on 09/06    Interval History: s/p angiography on 9/19. Resting comfortably today. Aware of update in plan to return to OR 9/20 for formal I&D with vac exchange. No other acute changes. Pulses to RLE remain dopplerable.       Review of patient's allergies indicates:   Allergen Reactions    Invokana [canagliflozin] Anaphylaxis    Percocet [oxycodone-acetaminophen] Nausea Only and Hallucinations    Biaxin [clarithromycin]     Hydrocodone Other (See Comments)     Dizzy/nausea/hallucinations    Sulfa (sulfonamide antibiotics) Nausea Only and Rash       Current Facility-Administered Medications   Medication    acetaminophen tablet 500 mg    albuterol-ipratropium 2.5 mg-0.5 mg/3 mL nebulizer solution 3 mL    amLODIPine tablet 10 mg    calcium carbonate 200 mg calcium (500 mg) chewable tablet 1,000 mg    dextrose 10% bolus 125 mL 125 mL    dextrose 10% bolus 125 mL 125 mL    dextrose 10% bolus 250 mL 250 mL    dextrose 10% bolus 250 mL 250 mL    enoxaparin injection 40 mg    glucagon (human recombinant) injection 1 mg    glucose chewable tablet 16 g    glucose chewable tablet 24 g    guaiFENesin 12 hr tablet 600 mg    hydrALAZINE tablet 25 mg    insulin aspart U-100 pen 0-10 Units    insulin aspart U-100 pen 4-8 Units    insulin glargine U-100 (Lantus) pen 28 Units    losartan tablet 25 mg    methocarbamoL tablet 500 mg    morphine injection 2 mg    morphine injection 2 mg    morphine injection 4 mg     "nortriptyline capsule 50 mg    ondansetron disintegrating tablet 8 mg    oxacillin 12 g in  mL CONTINUOUS INFUSION    pantoprazole EC tablet 40 mg    polyethylene glycol packet 17 g    senna tablet 8.6 mg    sodium bicarbonate tablet 1,300 mg    sucralfate 100 mg/mL suspension 1 g     Objective:     Vital Signs (Most Recent):  Temp: 97.8 °F (36.6 °C) (09/18/24 1202)  Pulse: 96 (09/18/24 1202)  Resp: 16 (09/18/24 1202)  BP: (!) 195/86 (09/18/24 1202)  SpO2: 96 % (09/18/24 1202) Vital Signs (24h Range):  Temp:  [97.7 °F (36.5 °C)-98.5 °F (36.9 °C)] 97.8 °F (36.6 °C)  Pulse:  [88-99] 96  Resp:  [16-22] 16  SpO2:  [94 %-96 %] 96 %  BP: (136-195)/(72-86) 195/86     Weight: 93 kg (205 lb 0.4 oz)  Height: 5' 5" (165.1 cm)  Body mass index is 34.12 kg/m².      Intake/Output Summary (Last 24 hours) at 9/18/2024 1402  Last data filed at 9/18/2024 1353  Gross per 24 hour   Intake 360 ml   Output 1600 ml   Net -1240 ml        Ortho/SPM Exam     AAOx4  NAD  Tachycardic  No increased WOB    RLE  Medial wound vac to good suction  Lateral side with fibrinous exudate and weeping ecchymosis distally  Compartments soft/compressible  Sensation diminished (at baseline)   Active toe dorsiflexion & plantarflexion  WWP. Brisk cap refill      Significant Labs:     Recent Results (from the past 336 hour(s))   Basic metabolic panel    Collection Time: 09/07/24  5:19 AM   Result Value Ref Range    Sodium 132 (L) 136 - 145 mmol/L    Potassium 4.0 3.5 - 5.1 mmol/L    Chloride 104 95 - 110 mmol/L    CO2 18 (L) 23 - 29 mmol/L    BUN 23 8 - 23 mg/dL    Creatinine 1.2 0.5 - 1.4 mg/dL    Calcium 8.5 (L) 8.7 - 10.5 mg/dL    Anion Gap 10 8 - 16 mmol/L         Significant Imaging: I have reviewed and interpreted all pertinent imaging results/findings.  CTA RLE: Multifocal high-grade stenoses throughout the right calf arteries. No arterial occlusion.    CTA chest: No large central PE identified  Assessment/Plan:     * Surgical wound " breakdown  Cortes Schroeder is a 64 y.o. male with PMH of DM, HTN  and right trimalleolar ankle fracture status post ex fix on 07/18 with subsequent definitive fixation on 07/26 admitted with right ankle wound dehiscence in the setting of uncontrolled diabetes.      He is s/p I&D of R ankle 09/06. Repeat I&D R medial ankle 09/09. 9/17 angiography and medial ankle wound vac exchange.     To OR 9/20 for repeat I&D and vac exchange.     Labs: Cr improving 1.2 today   Diet: Ok for diet with Boost. NPO for Friday   Pain control: multimodal regimen  PT/OT:  NWB RLE   DVT PPx: Lovenox    Abx:  oxacillin continuous; ID following   Cultures:  ankle cx staph +    Dispo: I&D and vac exchange 9/20 + pending plastics eval for free flap                 Artur Galvan MD  Orthopedics  [facility] [address] - Surgery

## 2024-09-18 NOTE — PLAN OF CARE
Problem: Adult Inpatient Plan of Care  Goal: Absence of Hospital-Acquired Illness or Injury  Outcome: Progressing  Goal: Optimal Comfort and Wellbeing  Outcome: Progressing  Goal: Readiness for Transition of Care  Outcome: Progressing     Problem: Fall Injury Risk  Goal: Absence of Fall and Fall-Related Injury  Outcome: Progressing     Problem: Sepsis/Septic Shock  Goal: Optimal Coping  Outcome: Progressing  Goal: Absence of Bleeding  Outcome: Progressing  Goal: Blood Glucose Level Within Targeted Range  Outcome: Progressing  Goal: Absence of Infection Signs and Symptoms  Outcome: Progressing  Goal: Optimal Nutrition Intake  Outcome: Progressing     Problem: Acute Kidney Injury/Impairment  Goal: Fluid and Electrolyte Balance  Outcome: Progressing  Goal: Improved Oral Intake  Outcome: Progressing  Goal: Effective Renal Function  Outcome: Progressing     Problem: Wound  Goal: Optimal Coping  Outcome: Progressing  Goal: Optimal Functional Ability  Outcome: Progressing  Goal: Absence of Infection Signs and Symptoms  Outcome: Progressing  Goal: Improved Oral Intake  Outcome: Progressing  Goal: Optimal Pain Control and Function  Outcome: Progressing  Goal: Skin Health and Integrity  Outcome: Progressing  Goal: Optimal Wound Healing  Outcome: Progressing     Problem: Diabetes Comorbidity  Goal: Blood Glucose Level Within Targeted Range  Outcome: Progressing     Problem: Skin Injury Risk Increased  Goal: Skin Health and Integrity  Outcome: Progressing     Problem: Infection  Goal: Absence of Infection Signs and Symptoms  Outcome: Progressing

## 2024-09-18 NOTE — PLAN OF CARE
Problem: Occupational Therapy  Goal: Occupational Therapy Goal  Description: Goals to be met by: 10/18/24     Patient will increase functional independence with ADLs by performing:    UE Dressing with Supervision.  LE Dressing with Supervision.  Grooming while seated at sink with Set-up Assistance.  Toileting from toilet with Stand-by Assistance for hygiene and clothing management.   All functional transfers performed with SBA    Outcome: Progressing

## 2024-09-18 NOTE — PT/OT/SLP PROGRESS
Speech Language Pathology Treatment/  Discharge     Patient Name:  Cortes Schroeder   MRN:  [id number]  Admitting Diagnosis: Surgical wound breakdown    Recommendations:                 General Recommendations:  Follow-up not indicated  Diet recommendations:  Regular Diet - IDDSI Level 7, Liquid Diet Level: Thin liquids - IDDSI Level 0   Aspiration Precautions: Standard aspiration precautions   General Precautions: Standard, fall  Communication strategies:  none    Assessment:     Cortes Schroeder is a 64 y.o. male with an SLP diagnosis of  functional swallow .      Subjective     Pt awake/alert in bed talking on the phone. Pt reports dislike for current diet and agreeable to ST session for possible advancement.     Pain/Comfort:  Pain Rating 1: 0/10  Pain Rating Post-Intervention 1: 0/10    Respiratory Status: Room air    Objective:     Has the patient been evaluated by SLP for swallowing?   Yes  Keep patient NPO? No     Pt seen bedside for dysphagia therapy. HOB elevated and pt able to self present trials of regular solids and thin liquids from cup rim. He demonstrated functional mastication, timely AP transit and no overt s/sx of airway compromise appreciated. Recommend he be advanced to regular solids at this time, no straws, meds as tolerated and standard aspiration precautions. White board updated. No further acute SLP needs.     Goals:   Multidisciplinary Problems       SLP Goals       Not on file              Multidisciplinary Problems (Resolved)          Problem: SLP    Goal Priority Disciplines Outcome   SLP Goal   (Resolved)     SLP Met   Description: Speech Language Pathology Goals  Goals expected to be met by 9/27    1. Pt will tolerate least restrictive diet with no signs of airway comprise                            Plan:     Plan of Care reviewed with:  patient   SLP Follow-Up:  No       Discharge recommendations:   (tbd)   Barriers to Discharge:  None    Time Tracking:     SLP Treatment Date:    09/18/24  Speech Start Time:  1533  Speech Stop Time:  1540     Speech Total Time (min):  7 min    Billable Minutes: Treatment Swallowing Dysfunction 7    09/18/2024

## 2024-09-18 NOTE — PROGRESS NOTES
[facility]  Progress Note    Patient Name: Cortes Schroeder  MRN: [id number]  Patient Class: IP- Inpatient   Admission Date: 9/6/2024  Length of Stay: 12 days  Attending Physician: Maria Del Rosario Medina MD  Primary Care Provider: Andrew Sinclair Jr., MD        Subjective:     Principal Problem:Surgical wound breakdown        HPI:  Cortes Schroeder is a 64 y.o. M with PMHx of anxiety, T2DM, GERD, HLD, HTN who presented to ED for AMS. He had a fall in July that resulted in R trimalleolar fracture and L distal radius fracture. He had external fixation on 07/18 and then ORIF on 07/26 with Dr. Liz. He was prescribed clinda 300 mg on 08/28 for a ten day course. Patient was doing well when he acutely became altered and not acting like himself a few hours ago. Patient family member drove him here from [facility] for ortho consult. R medial ankle wound dehisced with redness and swelling around it. No CPM fever, cough, N/V/D or seizure.    In ED: T max 99.1. SIRS 2/4 with WBC 18 and . CMP notable for Na 127, Cr 1.7, . Phos 1.9. .3. BNP 73. Trop neg. A1c 8.5. R tib fib XR notes There are 2 abandoned anterior-posteriorly oriented pin tracks in the right mid tibial shaft.  On AP views, each of these pin tracks demonstrates a small faint internal density, possibly representing a sequestrum. Ortho and endocrine consulted. Given IV vanc and IV clindamycin. Given 2.7L IVFs. Admitted to hospital medicine for sepsis 2/2 infected hardware.     Overview/Hospital Course:  Cortes Schroeder is a 65 yo M admitted to hospital medicine for sepsis 2/2 R ankle infection s/p ORIF on 07/26. Started on IV vanc and cefepime. Given IVFs. Brought to OR with ortho on 09/06 for irrigation debridement of RLE. Endocrine following for BG. Was on vanc and zosyn. Echo without concern for vegetations. 2/2 Blood cultures and 2/2 repeats with Staph aureus. Wound cultures with Staph aureus and Group B  strep. Will follow cultures. ID consulted as suspect patient will need long course of abx; now changing to continuous oxacillin. CTA chest negative for PE, but notes small area of ground glass changes to RUL. Patient now with cough. Adding doxy for possible PNA. Ortho repeated I&D on 09/09. WBC remains elevated, but fever has resolved. Plastics consulted for flap eval. CTA RLE with moderate atherosclerosis and multifocal high grade stenosis throughout R calf arteries. Vascular surgery consulted per plastics recs as they would like to pre-optimiize blood flow prior to any free flap or pedicled propellar flap. RD consulted for malnutrition. Now with DEEPALI, but likely 2/2 infection and multiple images requiring contrast. Will need PT/OT consult prior to discharge.     Interval History: feeling well this morning so far. Cr improved to 1.3 from 1.6 and at baseline now. BP higher on norvasc maxed out and given cr normalized now will resume home losartan 25 given held when had DEEPALI. Had angiogram yesterday with AT/PT requiring PTA with improved flow now. Plastic team with fellows at bedside now doing dopplers and discussed plans with them. They report plan for flap this admit and working on timing.  OR request 9/20 with ortho for vac change (changed yesterday, no purulence reported in op note), so they're going to check in with ortho to see if that is still planned and try to coordinate surgical planning with them for flap if there is any surg plans that day or if vac was changed yesterday and that is an old request as unsure given was not mentioned in op note. He asked about when he woud be able to go home and plastics reported that either option for coverage they have wound require a week stay after the procedure for dangle protocols and I explained to him what that meant and why. I told him that likely would need something like [facility] or [facility] potentially similar to last time given deconditioning and close wound  care/antibiotics after this hospital stay and he said he enjoyed the rehab last time and he is amenable to this. PT/OT to see him today as held off last week until medically had improved and NWB to RLE. ID reports hold off on rifampin until after flap placed/closure and oxacillin for now and are following for plans.         Review of Systems   Constitutional:  Positive for activity change and fatigue. Negative for chills and fever.   Respiratory:  Positive for cough. Negative for chest tightness and shortness of breath.    Cardiovascular:  Negative for chest pain and leg swelling.   Gastrointestinal:  Positive for nausea. Negative for abdominal pain.   Musculoskeletal:  Positive for arthralgias.   Skin:  Positive for color change and wound.   Neurological:  Negative for dizziness and weakness.     Objective:     Vital Signs (Most Recent):  Temp: 98.5 °F (36.9 °C) (09/18/24 0751)  Pulse: 95 (09/18/24 0751)  Resp: 16 (09/18/24 0751)  BP: (!) 163/79 (09/18/24 0946)  SpO2: 96 % (09/18/24 0900) Vital Signs (24h Range):  Temp:  [97.5 °F (36.4 °C)-98.5 °F (36.9 °C)] 98.5 °F (36.9 °C)  Pulse:  [88-99] 95  Resp:  [16-24] 16  SpO2:  [94 %-100 %] 96 %  BP: (136-174)/(63-82) 163/79     Weight: 93 kg (205 lb 0.4 oz)  Body mass index is 34.12 kg/m².    Intake/Output Summary (Last 24 hours) at 9/18/2024 1033  Last data filed at 9/18/2024 1023  Gross per 24 hour   Intake --   Output 1550 ml   Net -1550 ml         Physical Exam  Vitals and nursing note reviewed.   Constitutional:       Appearance: He is well-developed. He is obese. He is ill-appearing.      Comments: At baseline mental status without return of confusion. Off oxygen   Eyes:      Pupils: Pupils are equal, round, and reactive to light.   Cardiovascular:      Rate and Rhythm: Normal rate and regular rhythm.      Comments: HR 90s  Pulmonary:      Effort: Pulmonary effort is normal.      Breath sounds: Rales (bibasilar) present.      Comments: Off oxygen. Speaking in full  sentenecs without stopping for dyspnea. No crackles. No increased RR on exam. Breathing comfortably  Abdominal:      Palpations: Abdomen is soft.      Tenderness: There is no abdominal tenderness.   Musculoskeletal:         General: No tenderness.      Comments: Bandages in place. Wound vac with dark red output. Plastic surgery at bedside doing dopplers to RLE during exam.   Skin:     General: Skin is warm and dry.      Comments: C/d/I dressing to R ankle with wound vac.    Neurological:      Mental Status: He is alert and oriented to person, place, and time.      Comments: Close to mental status baseline, much improved   Psychiatric:         Behavior: Behavior normal.             Significant Labs: All pertinent labs within the past 24 hours have been reviewed.  CBC:   Recent Labs   Lab 09/17/24  0533 09/18/24  0320   WBC 14.74* 12.75*   HGB 8.5* 8.4*   HCT 26.1* 25.3*   * 689*     CMP:   Recent Labs   Lab 09/17/24  0533 09/18/24  0320   * 134*   K 4.2 3.9    105   CO2 21* 20*   * 218*   BUN 28* 22   CREATININE 1.3 1.2   CALCIUM 8.1* 7.8*   PROT 6.1 6.0   ALBUMIN 1.4* 1.4*   BILITOT 0.4 0.4   ALKPHOS 243* 213*   AST 21 20   ALT 6* 5*   ANIONGAP 9 9       Significant Imaging: I have reviewed all pertinent imaging results/findings within the past 24 hours.    Assessment/Plan:      * Surgical wound breakdown  Closed trimalleolar fracture of right ankle s/p ORIF in July  65 yo M presenting with AMS and worsening redness, swelling and pain to R ankle.     - continue oxacillin  - Ortho consulted:   - taken to OR 09/06 for debridement of surgical wound with wound vac placed. CX with group B strep and staph.   - Repeat I&D of R medial ankle on 09/09   - recommending plastics eval for free flap- angiogram done 9/17 and revascularized PT/AT and so now plastics planning for date for flap. Report will need at least a week in hospital after this for dangle protocols  Wound vac in place- vac change  9/17.  - follow wound cultures -- Staph aureus and GBS  - follow blood cultures -- Staph aureus.   - ID following:  - Continue IV oxacillin 12 g q 24 hours continuous infusion.  Monitor liver enzymes.  Plan for orals if hardware retained once completed.  - Anticipate 6 weeks of IV antibiotics from date of most recent washout.- tentative end date 10/21    - Ordered Hep B serologies before tentative discontinuation of Truvada, and addition of adjunctive rifampin for biofilm penetration of retained hardware . Now report plan to hold rifamipn until closure/flap.  - Anticipate long term oral  antibiotic suppression following IV abx   - Will follow.    - Plastic consulted:   - CTA reviewed, given numerous areas of high grade stenosis, will need Vascular consult for possible angiogram to pre-optimiize blood flow prior to any free flap or pedicled propellar flap. Angiogram on hold for DEEPALI which has resolved now so angiogram done 9/17 and PT/AT with PTA  - continue optimization of BG, endocrinology on board  - ID on board, continue  IV abx.  - orthopedics on board to assess for source control, possible hardware removal. Vac change 9/17  - malnutition: will need optimization prior to OR. Prealbumin and transferrin low on last check.   - Plan: after medically optimized, plan for flap closure of wound.   - Vascular surgery consulted; appreciate recs . Angiogram aborted 9/12 for mental status as well as 9/13 for DEEPALI. PT/AT with PTA on 9/18 with dr jj    Hyperphosphatemia  Binder started 9/14 as elevated from DEEPALI and stopped 9/16 as deepali better as is phos      Dysphagia  Coughing on exam witnesseed 9/13 with water and some secretions in airway on CT, speech eval placed and rec continue liquids at this time  Protnoix started as some reflux may be contributing, and doxy stopped 9/14 in case contributing also. Still some scratchinses, using spray to throat on 9/15, speech to reeval 9/16 on Monday  Requested soft diet as hates  the broth, upgraded 9/16 and did well, likely esophagitis main issue speech suspects      Constipation  Had issue last admit with prolonged constipation requiring enemas  -on bowel regimen  -has some bowel gas seen throughout CT abdomen, will giev suppository 9/13 to stay ahead and BM reported and 2 BMs 9/14      Bacteriuria  Seen on UA after delvalle for retention, per ID hold on teratment now as unclear if symptoms, reports 1 day of urine burning this week  -will f/u cx and has no growth      Airway malacia  Seen on CT, unclear cause as no hx of prologned intubation, etc.  -continu supportiv care, nebs, IS, humidified oxygen      Acute retention of urine  Required delvalle 9/12 for retenetion, per nursing had had issues on/off the last few adys and had immediate return of >300 cc once placed and very concentrated dark urine. Urien with sediment now and darker yellow on exam 9/13. Questionable infectious process with bacerueia, hold treatment for now per ID and monitor. DEEPALI, unclear cause if from retention, renal consulted, will f/u trends  Plan for voiding trial later in week once cr improves consistently  Plan for delvalle removal on 9/18 given procedure toady and Cr normalized/ATN resolved so wait until tomrrow so no confounding factors and order placed for delvalle removal today      Acute metabolic encephalopathy  Likely from meds versed, fentanyl for anioggram that was aborted on 9/12 due to change in mental status with poor clearance with DEEPALI as change in metnal status drastically after this was given 9/12, CT head wnl, ammonia/lactate, ck okay  Much improving mental status 9/13. Watch closely      Leukocytosis  Starting to improve 9/13 and now 15 on 9/15--> now 14      Sinus tachycardia  Suspect from volume down, IVF with improvement from 110s to 90s on 9/13      PAD (peripheral artery disease)  Workup with vascular now, THEO, high grade stenosis on CTA, aniogram with PT/AT stenosis and PTA on 9/17      Acute blood loss  "anemia  Anemia is likely due to acute blood loss which was from surgery . Most recent hemoglobin and hematocrit are listed below.  Recent Labs     09/10/24  0439 09/11/24  1041 09/12/24  0537   HGB 8.4* 9.0* 8.5*   HCT 25.9* 27.1* 26.0*     Plan  - Monitor serial CBC: Daily  - Transfuse PRBC if patient becomes hemodynamically unstable, symptomatic or H/H drops below 7/21.  - Patient has not received any PRBC transfusions to date  - Patient's anemia is currently stable  -     Cough  Covering doxy for URI, on mucinex, mostly dry but now with some scant white/clear sputum on 9./13 and sputum resolved now and doxy stopped 9/;14 per ID  Mucinex stopped 9/16 as he reports making him nauseated, changed back to pill form      Transaminitis  - liver enzymes up and down, highest ast at 155, between   Now at 96-80---49 and now normalized  - will hold truvada for now  - monitor with daily CMP    On pre-exposure prophylaxis for HIV  - holding truvada for transaminitis     MSSA bacteremia  - 2/2 blood cx with Staph aureus. 2/2 repeats positive. Repeats pending but NGTD  - Follow repeat blood cx  - ID following: start continuous oxacillin   - echo without concern for vegetations     Sepsis  This patient does have evidence of infective focus  My overall impression is sepsis.  Source: Skin and Soft Tissue (location ankle)  Antibiotics given-   Antibiotics (72h ago, onward)      Start     Stop Route Frequency Ordered    09/09/24 1730  oxacillin 12 g in  mL CONTINUOUS INFUSION         -- IV Every 24 hours (non-standard times) 09/09/24 1630          Latest lactate reviewed-  No results for input(s): "LACTATE", "POCLAC" in the last 72 hours.    Organ dysfunction indicated by Acute kidney injury and Encephalopathy    Fluid challenge Actual Body weight- Patient will receive 30ml/kg actual body weight to calculate fluid bolus for treatment of septic shock.     Post- resuscitation assessment No - Post resuscitation assessment " not needed     Will Not start Pressors-   Source control achieved by: see above  -secondary to surgical wound infeection in right ankle, with 9/6 wound Cx with group B strep and staph with bacteremia with 9/7 and 9/6  blood with staph with 9/8 and 9/10 blood Cx NGTD  ID following. Echo without signs of vegtations. On oxacillin now. Wbc still higher at 22, and ID following, procal 2 on 9/12-->1 on 9/13. CRp improved to 235. Wbc starting to improve at 18 now. .doxy stopped given no PNA signs on CT. Reached out to ortho on 9/14 regarding ankle as no obvious other source of elevated wbc per discussion with ID to see if any plans to reassess wound under vac, last op note had necrosis and no obvious purulence. Wbc improving 9/15 at 15 now->14--12  -ID reports plan is oxacillin + orals after if hardware retained. Full final recs pending but now report plan to hold rifampin at least until after closure/flap    Hyponatremia  Hyponatremia is likely due to Dehydration/hypovolemia. The patient's most recent sodium results are listed below.  Recent Labs     09/11/24  1041 09/12/24  0538 09/13/24  0244   * 131* 131*       Plan  - Correct the sodium by 4-6mEq in 24 hours.   - Obtain the following studies: Urine sodium, urine osmolality, serum osmolality.  - Will treat the hyponatremia with IV fluids as follows: NS  - Monitor sodium Daily.   - Patient hyponatremia is similar to prev day, osm low normal ranges, urine osm pending  Last admit had similar low and improved with volume    Uncontrolled diabetes mellitus with hyperglycemia  Patient's FSGs are not controlled on current hypoglycemics.   Last A1c reviewed-   Lab Results   Component Value Date    LABA1C 10.8 (H) 08/15/2016    HGBA1C 8.5 (H) 09/06/2024     Most recent fingerstick glucose reviewed-   Recent Labs   Lab 09/16/24  1113 09/16/24  1521 09/16/24  2119   POCTGLUCOSE 203* 267* 257*       Current correctional scale  Low  Maintain anti-hyperglycemic dose as follows-    Antihyperglycemics (From admission, onward)      Start     Stop Route Frequency Ordered    09/17/24 2100  insulin glargine U-100 (Lantus) pen 25 Units         -- SubQ Nightly 09/17/24 0815    09/17/24 1130  insulin aspart U-100 pen 4-8 Units         -- SubQ 3 times daily with meals 09/17/24 0815    09/13/24 0830  insulin aspart U-100 pen 0-10 Units         -- SubQ Every 4 hours PRN 09/13/24 0731           - Endocrine consulted:  - At this time, recommend that the patient remain off of his pump since he cannot be controlled in the Auto mode. Patient does not interact with pump frequently and relies on the auto mode algorithm.   - Lantus (Insulin Glargine) 35   - Novolog (Insulin Aspart) 9 units TIDWM (20% decrease given post-prandial BG below goal) (Hold if NPO) and prn for BG excursions MDC SSI (150/25)   - hold oral antihyperglycemics during hospitalization   - needs better BG control for optimal wound healing   Doses decreased on 9/12 for hypoglycemia this AM with endo managing and hypoglycemia again 9/13 in early AM overnight prior and endo adjusting further.   9/17: 220 this AM, suspect will start going up given advancing diet and more intake and endo adjusting    Closed trimalleolar fracture of right ankle  S/p ORIF 07/2024  Ortho consulted   - see surgical wound breakdown above  NWB    DEEPALI (acute kidney injury)  DEEPALI is likely due to pre-renal azotemia due to dehydration. Baseline creatinine is  1 . Most recent creatinine and eGFR are listed below.  Recent Labs     09/16/24  0510 09/17/24  0533 09/18/24  0320   CREATININE 1.8* 1.3 1.2   EGFRNORACEVR 41.8* >60.0 >60.0        Plan  - DEEPALI is worsening  - Avoid nephrotoxins and renally dose meds for GFR listed above  - Monitor urine output, serial BMP, and adjust therapy as needed  - baseline 1.6--2.1--2.6 now. EF on prev echo 70% so signs of volume depletion, as well as hyponatremia and tachycardia. Resume IVF 9/12   Renal consulted, rec stop IVF 9/13, ATN on  urine microscopy with casts, secondary to sepsis/contrast likely combination and cr not at plateau yet with Cr 3.3 on 9/14 and supportive care in interim.  Improving now at 2.7 on 9/15 with output picking up at 1.3 L.  And Cr now 1.9 and output 1.8L.  Cr 1.3 now on 9/17, angiogram today and still 1.3 and so losartan resumed 9/18 for BP control     GERD (gastroesophageal reflux disease)  Suspected gerd + esophagitis as cause for swallowing issues and speech feels this is most likely cause. From liquids to soft diet on 9/17 and tolerating well with improvements as well with carafate addition      Acute hypoxemic respiratory failure  Patient with Hypoxic Respiratory failure which is Acute.  he is not on home oxygen. Supplemental oxygen was provided and noted-      .   Signs/symptoms of respiratory failure include- tachypnea and lethargy. Contributing diagnoses includes - Obesity Hypoventilation and Pneumonia Labs and images were reviewed. Patient Has not had a recent ABG. Will treat underlying causes and adjust management of respiratory failure as follows-     - ABG on 9/12 without major abnormality, P02 [id number], [id number], continue oxygen now, CT chest showing tracheomalachia/hyperinflation lungs. Has no hx of excessive intubation or lung dx known to him. Using IS on exam 9/13 and speaking in full sentences on exam, with small amount of clear/white sputum. No obvious signs of volume overload, some cough on exam and keep on liquid diet per speech recs. Stop doxy on 9/14 as no signs of PNA on exam and could be contributor to some of dysphagia per ID. Wean oxygen as tolerates, sputum resolved per patient. Breathing comfortable on exam. On 2L now on 9/15 and sats high 90s  On/off oxygen since 9/16, has been off in AMs yes and today for exams          - CTA ordered by ortho/anesthesia prior to I&D. Negative for PE, but small ground glass area to RUL representing infectious vs. Inflammatory process.  - doxy added for pneumonia  "coverage.  - mucinex BID, acapella, and nebulized saline . Has IS at bedside on exam 9/12  - prn duo nebs   9/8: no signs of overload. Left Ventricle: The left ventricle is normal in size. Normal wall thickness. There is normal systolic function with a visually estimated ejection fraction of 65 - 70%. There is normal diastolic function.    Right Ventricle: Normal right ventricular cavity size. Wall thickness is normal. Systolic function is normal.    IVC/SVC: Normal venous pressure at 3 mmHg.    No evidence of intracardiac mass or vegetation.    Metabolic acidosis  Likely secondary to DEEPALI  Start Na bicarb on 9/12  Cr normalized 9/18 but bicarb still 20 on replacement so keep on for now until higher normal then can start titarting off      Uncontrolled type 2 diabetes mellitus with diabetic polyneuropathy, with long-term current use of insulin  Patient's FSGs are uncontrolled due to hyperglycemia on current medication regimen.  Last A1c reviewed-   Lab Results   Component Value Date    LABA1C 10.8 (H) 08/15/2016    HGBA1C 9.9 (H) 07/18/2024     Most recent fingerstick glucose reviewed- No results for input(s): "POCTGLUCOSE" in the last 24 hours.  Current correctional scale  Low  Maintain anti-hyperglycemic dose as follows-   Antihyperglycemics (From admission, onward)      Start     Stop Route Frequency Ordered    09/06/24 0506  insulin aspart U-100 pen 0-5 Units         -- SubQ Every 6 hours PRN 09/06/24 0406          Hold Oral hypoglycemics while patient is in the hospital.      VTE Risk Mitigation (From admission, onward)           Ordered     enoxaparin injection 40 mg  Daily         09/18/24 0808     IP VTE HIGH RISK PATIENT  Once         09/06/24 1735                    Discharge Planning   JAYLEEN: 9/25/2024     Code Status: Full Code   Is the patient medically ready for discharge?:     Reason for patient still in hospital (select all that apply): Patient trending condition  Discharge Plan A: Home Health    "               Maria Del Rosario Medina MD  Department of Hospital Medicine   [facility] - Surgery

## 2024-09-18 NOTE — ASSESSMENT & PLAN NOTE
Required delvalle 9/12 for retenetion, per nursing had had issues on/off the last few adys and had immediate return of >300 cc once placed and very concentrated dark urine. Urien with sediment now and darker yellow on exam 9/13. Questionable infectious process with bacerueia, hold treatment for now per ID and monitor. DEEPALI, unclear cause if from retention, renal consulted, will f/u trends  Plan for voiding trial later in week once cr improves consistently  Plan for delvalle removal on 9/18 given procedure toady and Cr normalized/ATN resolved so wait until tomrrow so no confounding factors and order placed for delvalle removal today

## 2024-09-18 NOTE — PT/OT/SLP EVAL
"Occupational Therapy   Evaluation    Name: Cortes Schroeder  MRN: [id number]  Admitting Diagnosis: Surgical wound breakdown  Recent Surgery: Procedure(s) (LRB):  Angiogram Extremity Unilateral (Right)  REPLACEMENT, WOUND VAC (Right) 1 Day Post-Op    Recommendations:     Discharge Recommendations: Moderate Intensity Therapy  Discharge Equipment Recommendations:  bedside commode  Barriers to discharge:  Decreased caregiver support    Assessment:   CO-TX with PT for pt safety and max participation with both disciplines.     Cortes Schroeder is a 64 y.o. male with a medical diagnosis of Surgical wound breakdown.  He presents with R LE pain. Performance deficits affecting function: weakness, impaired endurance, impaired self care skills, impaired functional mobility, gait instability, decreased lower extremity function, orthopedic precautions, impaired balance, pain. Pt was unclear with PLOF regarding ADLs and mobility. Upon eval, pt limited by pain and emotional lability.       Rehab Prognosis: Good; patient would benefit from acute skilled OT services to address these deficits and reach maximum level of function.       Plan:     Patient to be seen 4 x/week to address the above listed problems via self-care/home management, therapeutic activities, therapeutic exercises  Plan of Care Expires: 10/18/24  Plan of Care Reviewed with: patient    Subjective     Chief Complaint: "I just want to go home"  Patient/Family Comments/goals: return home    Occupational Profile:  Living Environment: Lives with roommate, in H, 0 AMY and no hand rail, WIS in bathroom  Previous level of function: Mod I  Roles and Routines: roommate  Equipment Used at Home: wheelchair, walker, rolling (platform RW)  Assistance upon Discharge: unknown    Pain/Comfort:  Pain Rating 1: 6/10  Location - Side 1: Right  Location - Orientation 1: lower  Location 1: leg  Pain Addressed 1: Reposition, Distraction    Patients cultural, spiritual, Jainism conflicts " given the current situation: no    Objective:     Communicated with: Nsg prior to session.  Patient found HOB elevated with wound vac, peripheral IV, telemetry upon OT entry to room.    General Precautions: Standard, fall, aspiration, dental soft  Orthopedic Precautions: RLE non weight bearing  Braces: N/A  Respiratory Status: Room air    Occupational Performance:    Bed Mobility:    Patient completed Supine to Sit with stand by assistance  Patient completed Sit to Supine with stand by assistance    Functional Mobility/Transfers:  Patient completed Sit <> Stand Transfer with moderate assistance and of 2 persons  with  rolling walker   Patient completed Bed <> Chair Transfer using Stand Pivot technique with moderate assistance and of 2 persons with rolling walker  Functional Mobility: Pt able to ambulate to chair using hop method to adhere to NWB RLE c/ 2 person assist.    Activities of Daily Living:  Feeding:  stand by assistance drink water  Grooming: stand by assistance facial h seated in chair  Upper Body Dressing: minimum assistance don gown for backside    Cognitive/Visual Perceptual:  A&O x4    Physical Exam:  BUE WFL  Balance: Poor    AMPAC 6 Click ADL:  AMPAC Total Score: 18    Treatment & Education:  Pt educated on role and purpose of therapy  Pt educated on goal setting  Pt educated on benefits of OOB activity  Pt educated on self advocacy   Pt educated on NWB precautions     Returned to room later per pt request in order to transfer pt back to bed.    Pt sit> stand from chair c/ Mod A x2  Pt stand pivot transfer via hop to EOB using RW x/ Mod A x2  Pt sit>supine c/ SBA      Patient left HOB elevated with all lines intact, call button in reach, and nsg notified    GOALS:   Multidisciplinary Problems       Occupational Therapy Goals          Problem: Occupational Therapy    Goal Priority Disciplines Outcome Interventions   Occupational Therapy Goal     OT, PT/OT Progressing    Description: Goals to be met  by: 10/18/24     Patient will increase functional independence with ADLs by performing:    UE Dressing with Supervision.  LE Dressing with Supervision.  Grooming while seated at sink with Set-up Assistance.  Toileting from toilet with Stand-by Assistance for hygiene and clothing management.   All functional transfers performed with SBA                         History:     Past Medical History:   Diagnosis Date    Anxiety 12/18/2012    Back pain 12/18/2012    Cataract     Chronic pain syndrome 04/24/2016    Diabetes type 2, controlled 02/20/2016    Diabetic retinopathy     DM (diabetes mellitus) 12/18/2012    DM (diabetes mellitus), type 2, uncontrolled 11/16/2013    Gastroesophageal reflux disease without esophagitis 02/20/2016    Hyperlipidemia 12/18/2012    Insomnia 08/07/2014    Neuropathy 11/16/2013         Past Surgical History:   Procedure Laterality Date    ANGIOGRAPHY OF LOWER EXTREMITY Right 9/17/2024    Procedure: Angiogram Extremity Unilateral;  Surgeon: GINA Morton II, MD;  Location: [facility] OR [address] [place] [address];  Service: Vascular;  Laterality: Right;  Fluro time 19.5 min  mGy 260.49    APPLICATION OF WOUND VACUUM-ASSISTED CLOSURE DEVICE Right 9/6/2024    Procedure: APPLICATION, WOUND VAC;  Surgeon: Moe Liz MD;  Location: [address];  Service: Orthopedics;  Laterality: Right;    APPLICATION, EXTERNAL FIXATION DEVICE, FOR ANKLE FRACTURE Right 7/18/2024    Procedure: APPLICATION, EXTERNAL FIXATION DEVICE, FOR ANKLE FRACTURE;  Surgeon: Moe Liz MD;  Location: [address];  Service: Orthopedics;  Laterality: Right;    ARTHROTOMY OF ANKLE Right 9/9/2024    Procedure: ARTHROTOMY, ANKLE;  Surgeon: Moe Liz MD;  Location: [address];  Service: Orthopedics;  Laterality: Right;    BACK SURGERY  2003    Lumbar Spine    CATARACT EXTRACTION      CLOSED REDUCTION, FRACTURE, ANKLE, TRIMALLEOLAR Right 7/18/2024    Procedure: CLOSED REDUCTION, FRACTURE, ANKLE, TRIMALLEOLAR;   Surgeon: Moe Liz MD;  Location: [facility] OR [address] [place] [address];  Service: Orthopedics;  Laterality: Right;    EPIDURAL STEROID INJECTION N/A 1/16/2019    Procedure: Injection, Steroid, Epidural Cervical;  Surgeon: Andrew Sinclair Jr., MD;  Location: [facility] ENDO;  Service: Pain Management;  Laterality: N/A;  Cervical Epidural Steroid Injection C7-T1    36155    Arrive @ 1240    EPIDURAL STEROID INJECTION N/A 2/20/2019    Procedure: Injection, Steroid, Epidural;  Surgeon: Andrew Sinclair Jr., MD;  Location: [facility] ENDO;  Service: Pain Management;  Laterality: N/A;  Lumbar Epidural Steroid Injection L4-5    94908    Arrive @ 1215 (requests latest time)    FIXATION OF SYNDESMOSIS OF ANKLE Right 7/26/2024    Procedure: FIXATION, SYNDESMOSIS, ANKLE;  Surgeon: Moe Liz MD;  Location: [address];  Service: Orthopedics;  Laterality: Right;    INJECTION, SPINE, LUMBOSACRAL, TRANSFORAMINAL APPROACH Bilateral 6/14/2024    Procedure: Bilateral L5 Transforaminal Epidural Steroid Injections;  Surgeon: Andrew Sinclair Jr., MD;  Location: [facility] PAIN MANAGEMENT;  Service: Pain Management;  Laterality: Bilateral;  @1300(given)  ASA last 6/8  Check BG  MD Sign.    IRRIGATION AND DEBRIDEMENT Right 9/6/2024    Procedure: IRRIGATION AND DEBRIDEMENT;  Surgeon: Moe Liz MD;  Location: [address];  Service: Orthopedics;  Laterality: Right;    IRRIGATION AND DEBRIDEMENT OF LOWER EXTREMITY Right 9/9/2024    Procedure: IRRIGATION AND DEBRIDEMENT, LOWER EXTREMITY - WITH WOUND VAC CHANGE, RIGHT ANKLE;  Surgeon: Moe Liz MD;  Location: [address];  Service: Orthopedics;  Laterality: Right;  WITH WOUND VAC CHANGE, RIGHT ANKLE    OPEN REDUCTION AND INTERNAL FIXATION (ORIF) OF FRACTURE OF DISTAL RADIUS Left 7/19/2024    Procedure: ORIF, FRACTURE, RADIUS, DISTAL - LEFT;  Surgeon: Moe Liz MD;  Location: [address];  Service: Orthopedics;  Laterality: Left;  LEFT, SYNTHES, C ARM    OPEN  REDUCTION AND INTERNAL FIXATION (ORIF) OF INJURY OF ANKLE Right 7/26/2024    Procedure: ORIF, ANKLE;  Surgeon: Moe Liz MD;  Location: [address];  Service: Orthopedics;  Laterality: Right;    REMOVAL OF EXTERNAL FIXATION DEVICE Right 7/26/2024    Procedure: REMOVAL, EXTERNAL FIXATION DEVICE;  Surgeon: Moe Liz MD;  Location: [address];  Service: Orthopedics;  Laterality: Right;    REPLACEMENT OF WOUND VACUUM-ASSISTED CLOSURE DEVICE Right 9/17/2024    Procedure: REPLACEMENT, WOUND VAC;  Surgeon: Moe Liz MD;  Location: [address];  Service: Orthopedics;  Laterality: Right;  wound vac dressing exchange       Time Tracking:     OT Date of Treatment: 09/18/24  OT Start Time: 0915  OT Stop Time: 0945 (returned later to help pt back to bed (10 min))  OT Total Time (min): 30 min +10 of tx= 40 min    Billable Minutes:Evaluation 15  Self Care/Home Management 15  Therapeutic Activity 10    9/18/2024

## 2024-09-18 NOTE — ASSESSMENT & PLAN NOTE
Endocrinology consulted for BG management.   BG goal 140-180    - Lantus (Insulin Glargine 28 units nightly   - Novolog 4-8 with meals  - [facility] SSI (150/25)  - BG checks q4hr  - Hypoglycemia protocol in place    ** Please notify Endocrine for any change and/or advance in diet**  ** Please call Endocrine for any BG related issues **    Discharge Planning:   TBD. Please notify endocrinology prior to discharge.

## 2024-09-18 NOTE — PROGRESS NOTES
[facility] - [facility]  Vascular Surgery  Progress Note    Patient Name: Cortes Schroeder  MRN: [id number]  Admission Date: 9/6/2024  Primary Care Provider: Andrew Sinclair Jr., MD    Subjective:     Interval History: Patient is doing well, resting comfortably in bed. NAEON. Pain controlled    Post-Op Info:  Procedure(s) (LRB):  Angiogram Extremity Unilateral (Right)  REPLACEMENT, WOUND VAC (Right)   1 Day Post-Op     Medications:  Continuous Infusions:   0.9% NaCl   Intravenous Continuous         Scheduled Meds:   amLODIPine  10 mg Oral Daily    enoxaparin  30 mg Subcutaneous Daily    guaiFENesin  600 mg Oral BID    insulin aspart U-100  4-8 Units Subcutaneous TIDWM    insulin glargine U-100  25 Units Subcutaneous QHS    morphine  2 mg Intravenous Once    nortriptyline  50 mg Oral Nightly    oxacillin 12 g in  mL CONTINUOUS INFUSION  12 g Intravenous Q24H    pantoprazole  40 mg Oral Daily    polyethylene glycol  17 g Oral BID    senna  8.6 mg Oral BID    sodium bicarbonate  1,300 mg Oral TID    sucralfate  1 g Oral Q6H     PRN Meds:  Current Facility-Administered Medications:     acetaminophen, 500 mg, Oral, Q8H PRN    albuterol-ipratropium, 3 mL, Nebulization, Q4H PRN    calcium carbonate, 1,000 mg, Oral, TID PRN    dextrose 10%, 12.5 g, Intravenous, PRN    dextrose 10%, 12.5 g, Intravenous, PRN    dextrose 10%, 25 g, Intravenous, PRN    dextrose 10%, 25 g, Intravenous, PRN    glucagon (human recombinant), 1 mg, Intramuscular, PRN    glucose, 16 g, Oral, PRN    glucose, 24 g, Oral, PRN    hydrALAZINE, 25 mg, Oral, Q8H PRN    insulin aspart U-100, 0-10 Units, Subcutaneous, Q4H PRN    methocarbamoL, 500 mg, Oral, TID PRN    morphine, 2 mg, Intravenous, Q6H PRN    morphine, 4 mg, Intravenous, Q6H PRN    ondansetron, 8 mg, Oral, Q8H PRN     Objective:     Vital Signs (Most Recent):  Temp: 97.8 °F (36.6 °C) (09/18/24 0322)  Pulse: 97 (09/18/24 0322)  Resp: 18 (09/18/24 0322)  BP: (!) 172/75 (09/18/24 0322)  SpO2:  95 % (09/18/24 0500) Vital Signs (24h Range):  Temp:  [97.5 °F (36.4 °C)-98.3 °F (36.8 °C)] 97.8 °F (36.6 °C)  Pulse:  [88-99] 97  Resp:  [14-24] 18  SpO2:  [94 %-100 %] 95 %  BP: (136-174)/(63-82) 172/75          Physical Exam  Eyes:      Pupils: Pupils are equal, round, and reactive to light.   Cardiovascular:      Rate and Rhythm: Normal rate.      Comments: Dopplerable DP and PT  pulse RLE  Pulmonary:      Effort: Pulmonary effort is normal.   Abdominal:      General: Abdomen is flat.   Skin:     General: Skin is warm.      Comments: L groin dressing of gauze and tegaderm c/d/i  RLE surgical dressings, wound vac   Neurological:      Mental Status: He is alert.          Significant Labs:  All pertinent labs from the last 24 hours have been reviewed.    Significant Diagnostics:  I have reviewed all pertinent imaging results/findings within the past 24 hours.  Assessment/Plan:     PAD (peripheral artery disease)  64-year-old gentleman presenting with nonhealing wound of the right lower extremity.    - POD1 from RLE angiogram  - q4 neuro checks, good pulses on rounds  - Vascular surgery will continue to follow peripherally at this time           Leatha Sims MD  Vascular Surgery  [facility] - Surgery

## 2024-09-18 NOTE — PLAN OF CARE
PT antoine completed- see note for details, goals and POC established.     Problem: Physical Therapy  Goal: Physical Therapy Goal  Description: Goals to be met by: 10/18/24     Patient will increase functional independence with mobility by performin. Supine to sit with Dagmar  2. Sit to stand transfer with Contact Guard Assistance  3. Bed to chair transfer with Contact Guard Assistance using Rolling Walker  4. Gait  x 10 feet with Contact Guard Assistance using Rolling Walker.   5. Stand for 5 minutes with Stand-by Assistance using Rolling Walker    Patient has a mobility limitation that significantly impairs their ability to participate in one or more mobility related activities of daily living, including toileting. This deficit can be resolved by using a bedside commode. Patient demonstrates mobility limitations that will cause them to be confined to one room at home without bathroom access for up to 30 days. Using a bedside commode will greatly improve the patient's ability to participate in MRADLs.    Outcome: Progressing   2024

## 2024-09-18 NOTE — ASSESSMENT & PLAN NOTE
Likely secondary to DEEPALI  Start Na bicarb on 9/12  Cr normalized 9/18 but bicarb still 20 on replacement so keep on for now until higher normal then can start titarting off

## 2024-09-18 NOTE — ASSESSMENT & PLAN NOTE
DEEPALI is likely due to pre-renal azotemia due to dehydration. Baseline creatinine is  1 . Most recent creatinine and eGFR are listed below.  Recent Labs     09/16/24  0510 09/17/24  0533 09/18/24  0320   CREATININE 1.8* 1.3 1.2   EGFRNORACEVR 41.8* >60.0 >60.0        Plan  - DEEPALI is worsening  - Avoid nephrotoxins and renally dose meds for GFR listed above  - Monitor urine output, serial BMP, and adjust therapy as needed  - baseline 1.6--2.1--2.6 now. EF on prev echo 70% so signs of volume depletion, as well as hyponatremia and tachycardia. Resume IVF 9/12   Renal consulted, rec stop IVF 9/13, ATN on urine microscopy with casts, secondary to sepsis/contrast likely combination and cr not at plateau yet with Cr 3.3 on 9/14 and supportive care in interim.  Improving now at 2.7 on 9/15 with output picking up at 1.3 L.  And Cr now 1.9 and output 1.8L.  Cr 1.3 now on 9/17, angiogram today and still 1.3 and so losartan resumed 9/18 for BP control

## 2024-09-18 NOTE — ASSESSMENT & PLAN NOTE
"This patient does have evidence of infective focus  My overall impression is sepsis.  Source: Skin and Soft Tissue (location ankle)  Antibiotics given-   Antibiotics (72h ago, onward)      Start     Stop Route Frequency Ordered    09/09/24 1730  oxacillin 12 g in  mL CONTINUOUS INFUSION         -- IV Every 24 hours (non-standard times) 09/09/24 1630          Latest lactate reviewed-  No results for input(s): "LACTATE", "POCLAC" in the last 72 hours.    Organ dysfunction indicated by Acute kidney injury and Encephalopathy    Fluid challenge Actual Body weight- Patient will receive 30ml/kg actual body weight to calculate fluid bolus for treatment of septic shock.     Post- resuscitation assessment No - Post resuscitation assessment not needed     Will Not start Pressors-   Source control achieved by: see above  -secondary to surgical wound infeection in right ankle, with 9/6 wound Cx with group B strep and staph with bacteremia with 9/7 and 9/6  blood with staph with 9/8 and 9/10 blood Cx NGTD  ID following. Echo without signs of vegtations. On oxacillin now. Wbc still higher at 22, and ID following, procal 2 on 9/12-->1 on 9/13. CRp improved to 235. Wbc starting to improve at 18 now. .doxy stopped given no PNA signs on CT. Reached out to ortho on 9/14 regarding ankle as no obvious other source of elevated wbc per discussion with ID to see if any plans to reassess wound under vac, last op note had necrosis and no obvious purulence. Wbc improving 9/15 at 15 now->14--12  -ID reports plan is oxacillin + orals after if hardware retained. Full final recs pending but now report plan to hold rifampin at least until after closure/flap  "

## 2024-09-18 NOTE — ASSESSMENT & PLAN NOTE
Closed trimalleolar fracture of right ankle s/p ORIF in July  63 yo M presenting with AMS and worsening redness, swelling and pain to R ankle.     - continue oxacillin  - Ortho consulted:   - taken to OR 09/06 for debridement of surgical wound with wound vac placed. CX with group B strep and staph.   - Repeat I&D of R medial ankle on 09/09   - recommending plastics eval for free flap- angiogram done 9/17 and revascularized PT/AT and so now plastics planning for date for flap. Report will need at least a week in hospital after this for jamel rich  Wound vac in place- vac change 9/17.  - follow wound cultures -- Staph aureus and GBS  - follow blood cultures -- Staph aureus.   - ID following:  - Continue IV oxacillin 12 g q 24 hours continuous infusion.  Monitor liver enzymes.  Plan for orals if hardware retained once completed.  - Anticipate 6 weeks of IV antibiotics from date of most recent washout.- tentative end date 10/21    - Ordered Hep B serologies before tentative discontinuation of Truvada, and addition of adjunctive rifampin for biofilm penetration of retained hardware . Now report plan to hold rifamipn until closure/flap.  - Anticipate long term oral  antibiotic suppression following IV abx   - Will follow.    - Plastic consulted:   - CTA reviewed, given numerous areas of high grade stenosis, will need Vascular consult for possible angiogram to pre-optimiize blood flow prior to any free flap or pedicled propellar flap. Angiogram on hold for DEEPALI which has resolved now so angiogram done 9/17 and PT/AT with PTA  - continue optimization of BG, endocrinology on board  - ID on board, continue  IV abx.  - orthopedics on board to assess for source control, possible hardware removal. Vac change 9/17  - malnutition: will need optimization prior to OR. Prealbumin and transferrin low on last check.   - Plan: after medically optimized, plan for flap closure of wound.   - Vascular surgery consulted; appreciate recs  . Angiogram aborted 9/12 for mental status as well as 9/13 for DEEPALI. PT/AT with PTA on 9/18 with dr jj

## 2024-09-18 NOTE — SUBJECTIVE & OBJECTIVE
Medications:  Continuous Infusions:   0.9% NaCl   Intravenous Continuous         Scheduled Meds:   amLODIPine  10 mg Oral Daily    enoxaparin  30 mg Subcutaneous Daily    guaiFENesin  600 mg Oral BID    insulin aspart U-100  4-8 Units Subcutaneous TIDWM    insulin glargine U-100  25 Units Subcutaneous QHS    morphine  2 mg Intravenous Once    nortriptyline  50 mg Oral Nightly    oxacillin 12 g in  mL CONTINUOUS INFUSION  12 g Intravenous Q24H    pantoprazole  40 mg Oral Daily    polyethylene glycol  17 g Oral BID    senna  8.6 mg Oral BID    sodium bicarbonate  1,300 mg Oral TID    sucralfate  1 g Oral Q6H     PRN Meds:  Current Facility-Administered Medications:     acetaminophen, 500 mg, Oral, Q8H PRN    albuterol-ipratropium, 3 mL, Nebulization, Q4H PRN    calcium carbonate, 1,000 mg, Oral, TID PRN    dextrose 10%, 12.5 g, Intravenous, PRN    dextrose 10%, 12.5 g, Intravenous, PRN    dextrose 10%, 25 g, Intravenous, PRN    dextrose 10%, 25 g, Intravenous, PRN    glucagon (human recombinant), 1 mg, Intramuscular, PRN    glucose, 16 g, Oral, PRN    glucose, 24 g, Oral, PRN    hydrALAZINE, 25 mg, Oral, Q8H PRN    insulin aspart U-100, 0-10 Units, Subcutaneous, Q4H PRN    methocarbamoL, 500 mg, Oral, TID PRN    morphine, 2 mg, Intravenous, Q6H PRN    morphine, 4 mg, Intravenous, Q6H PRN    ondansetron, 8 mg, Oral, Q8H PRN     Objective:     Vital Signs (Most Recent):  Temp: 97.8 °F (36.6 °C) (09/18/24 0322)  Pulse: 97 (09/18/24 0322)  Resp: 18 (09/18/24 0322)  BP: (!) 172/75 (09/18/24 0322)  SpO2: 95 % (09/18/24 0500) Vital Signs (24h Range):  Temp:  [97.5 °F (36.4 °C)-98.3 °F (36.8 °C)] 97.8 °F (36.6 °C)  Pulse:  [88-99] 97  Resp:  [14-24] 18  SpO2:  [94 %-100 %] 95 %  BP: (136-174)/(63-82) 172/75          Physical Exam  Eyes:      Pupils: Pupils are equal, round, and reactive to light.   Cardiovascular:      Rate and Rhythm: Normal rate.      Comments: 2+ DP  pulse RLE  Pulmonary:      Effort: Pulmonary  effort is normal.   Abdominal:      General: Abdomen is flat.   Skin:     General: Skin is warm.      Comments: L groin dressing of gauze and tegaderm c/d/i  RLE surgical dressings, wound vac   Neurological:      Mental Status: He is alert.          Significant Labs:  All pertinent labs from the last 24 hours have been reviewed.    Significant Diagnostics:  I have reviewed all pertinent imaging results/findings within the past 24 hours.

## 2024-09-18 NOTE — SUBJECTIVE & OBJECTIVE
Interval History: feeling well this morning so far. Cr improved to 1.3 from 1.6 and at baseline now. BP higher on norvasc maxed out and given cr normalized now will resume home losartan 25 given held when had DEEPALI. Had angiogram yesterday with AT/PT requiring PTA with improved flow now. Plastic team with fellows at bedside now doing dopplers and discussed plans with them. They report plan for flap this admit and working on timing.  OR request 9/20 with ortho for vac change (changed yesterday, no purulence reported in op note), so they're going to check in with ortho to see if that is still planned and try to coordinate surgical planning with them for flap if there is any surg plans that day or if vac was changed yesterday and that is an old request as unsure given was not mentioned in op note. He asked about when he woud be able to go home and plastics reported that either option for coverage they have wound require a week stay after the procedure for dangle protocols and I explained to him what that meant and why. I told him that likely would need something like [facility] or [facility] potentially similar to last time given deconditioning and close wound care/antibiotics after this hospital stay and he said he enjoyed the rehab last time and he is amenable to this. PT/OT to see him today as held off last week until medically had improved and NWB to RLE. ID reports hold off on rifampin until after flap placed/closure and oxacillin for now and are following for plans.         Review of Systems   Constitutional:  Positive for activity change and fatigue. Negative for chills and fever.   Respiratory:  Positive for cough. Negative for chest tightness and shortness of breath.    Cardiovascular:  Negative for chest pain and leg swelling.   Gastrointestinal:  Positive for nausea. Negative for abdominal pain.   Musculoskeletal:  Positive for arthralgias.   Skin:  Positive for color change and wound.   Neurological:  Negative  for dizziness and weakness.     Objective:     Vital Signs (Most Recent):  Temp: 98.5 °F (36.9 °C) (09/18/24 0751)  Pulse: 95 (09/18/24 0751)  Resp: 16 (09/18/24 0751)  BP: (!) 163/79 (09/18/24 0946)  SpO2: 96 % (09/18/24 0900) Vital Signs (24h Range):  Temp:  [97.5 °F (36.4 °C)-98.5 °F (36.9 °C)] 98.5 °F (36.9 °C)  Pulse:  [88-99] 95  Resp:  [16-24] 16  SpO2:  [94 %-100 %] 96 %  BP: (136-174)/(63-82) 163/79     Weight: 93 kg (205 lb 0.4 oz)  Body mass index is 34.12 kg/m².    Intake/Output Summary (Last 24 hours) at 9/18/2024 1033  Last data filed at 9/18/2024 1023  Gross per 24 hour   Intake --   Output 1550 ml   Net -1550 ml         Physical Exam  Vitals and nursing note reviewed.   Constitutional:       Appearance: He is well-developed. He is obese. He is ill-appearing.      Comments: At baseline mental status without return of confusion. Off oxygen   Eyes:      Pupils: Pupils are equal, round, and reactive to light.   Cardiovascular:      Rate and Rhythm: Normal rate and regular rhythm.      Comments: HR 90s  Pulmonary:      Effort: Pulmonary effort is normal.      Breath sounds: Rales (bibasilar) present.      Comments: Off oxygen. Speaking in full sentenecs without stopping for dyspnea. No crackles. No increased RR on exam. Breathing comfortably  Abdominal:      Palpations: Abdomen is soft.      Tenderness: There is no abdominal tenderness.   Musculoskeletal:         General: No tenderness.      Comments: Bandages in place. Wound vac with dark red output. Plastic surgery at bedside doing dopplers to RLE during exam.   Skin:     General: Skin is warm and dry.      Comments: C/d/I dressing to R ankle with wound vac.    Neurological:      Mental Status: He is alert and oriented to person, place, and time.      Comments: Close to mental status baseline, much improved   Psychiatric:         Behavior: Behavior normal.             Significant Labs: All pertinent labs within the past 24 hours have been  reviewed.  CBC:   Recent Labs   Lab 09/17/24  0533 09/18/24  0320   WBC 14.74* 12.75*   HGB 8.5* 8.4*   HCT 26.1* 25.3*   * 689*     CMP:   Recent Labs   Lab 09/17/24  0533 09/18/24  0320   * 134*   K 4.2 3.9    105   CO2 21* 20*   * 218*   BUN 28* 22   CREATININE 1.3 1.2   CALCIUM 8.1* 7.8*   PROT 6.1 6.0   ALBUMIN 1.4* 1.4*   BILITOT 0.4 0.4   ALKPHOS 243* 213*   AST 21 20   ALT 6* 5*   ANIONGAP 9 9       Significant Imaging: I have reviewed all pertinent imaging results/findings within the past 24 hours.

## 2024-09-18 NOTE — ASSESSMENT & PLAN NOTE
Cortes Schroeder is a 64 y.o. male with PMH of DM, HTN  and right trimalleolar ankle fracture status post ex fix on 07/18 with subsequent definitive fixation on 07/26 admitted with right ankle wound dehiscence in the setting of uncontrolled diabetes.      He is s/p I&D of R ankle 09/06. Repeat I&D R medial ankle 09/09. 9/17 angiography and medial ankle wound vac exchange.     To OR 9/20 for repeat I&D and vac exchange.     Labs: Cr improving 1.2 today   Diet: Ok for diet with Boost. NPO for Friday   Pain control: multimodal regimen  PT/OT:  NWB RLE   DVT PPx: Lovenox    Abx:  oxacillin continuous; ID following   Cultures:  ankle cx staph +    Dispo: I&D and vac exchange 9/20 + pending plastics eval for free flap

## 2024-09-18 NOTE — PROGRESS NOTES
[facility] - [facility]  Endocrinology  Progress Note    Admit Date: 2024     Reason for Consult: Management of T2DM, Hyperglycemia      Surgical Procedure and Date: S/P irrigation debridement of RLE on 2024    Diabetes diagnosis year:      Home Diabetes Medications:    Humalog via OmniPod insulin pump  Mounjaro 10 mg weekly     Current pump settings:  Basal 12 am to 2.3 and 6 am to 1.55  ICR to 3 at 12 am, 2 at 4 pm  ISF to 1:20   AIT 3 hrs  Target 110-120        Patient had anaphylaxis reaction to SGLT2 inhibitors specifically Invokana     How often checking glucose at home? Dexcom G6   BG readings on regimen: Average 210's per Dexcom  Hypoglycemia on the regimen?  Yes  Missed doses on regimen?  No     Diabetes Complications include:     Hyperglycemia and Diabetic peripheral neuropathy         Complicating diabetes co morbidities:   HLD, HTN, Obesity         HPI: 64 y.o. male presents to the ED w/ complaint of altered mental status. Hx of R ankle fracture with surgery over a month ago. Patient now presents with sepsis 2/2 R ankle infection s/p ORIF on . Started on IV vanc and cefepime. Given IVFs. Blood cultures pending. Brought to OR with ortho on  for irrigation debridement of RLE. Endocrine following for BG and type 2 diabetes.     Lab Results   Component Value Date    LABA1C 10.8 (H) 08/15/2016    HGBA1C 8.5 (H) 2024         Interval HPI:   Overnight events: No acute events overnight. Patient in room 542/542 A. Blood glucose improving. BG at and above goal on current insulin regimen (SSI, prandial, and basal insulin ). Steroid use- None. 1 Day Post-Op  Renal function- Normal   Vasopressors-  None       Endocrine will continue to follow and manage insulin orders inpatient.         Diet diabetic Soft & Bite Sized (IDDSI Level 6); 2000 Calorie  Diet NPO Except for: Sips with Medication     Eatin%  Nausea: No  Hypoglycemia and intervention: No  Fever: No  TPN and/or TF: No      BP  "(!) 163/79 (Patient Position: Lying)   Pulse 95   Temp 98.5 °F (36.9 °C) (Oral)   Resp 16   Ht 5' 5" (1.651 m)   Wt 93 kg (205 lb 0.4 oz)   SpO2 96%   BMI 34.12 kg/m²     Labs Reviewed and Include    Recent Labs   Lab 09/18/24  0320   *   CALCIUM 7.8*   ALBUMIN 1.4*   PROT 6.0   *   K 3.9   CO2 20*      BUN 22   CREATININE 1.2   ALKPHOS 213*   ALT 5*   AST 20   BILITOT 0.4     Lab Results   Component Value Date    WBC 12.75 (H) 09/18/2024    HGB 8.4 (L) 09/18/2024    HCT 25.3 (L) 09/18/2024    MCV 85 09/18/2024     (H) 09/18/2024     No results for input(s): "TSH", "FREET4" in the last 168 hours.  Lab Results   Component Value Date    HGBA1C 8.5 (H) 09/06/2024       Nutritional status:   Body mass index is 34.12 kg/m².  Lab Results   Component Value Date    ALBUMIN 1.4 (L) 09/18/2024    ALBUMIN 1.4 (L) 09/17/2024    ALBUMIN 1.3 (L) 09/16/2024     Lab Results   Component Value Date    PREALBUMIN 3 (L) 09/06/2024    PREALBUMIN 13 (L) 07/18/2024       Estimated Creatinine Clearance: 66 mL/min (based on SCr of 1.2 mg/dL).    Accu-Checks  Recent Labs     09/16/24  0002 09/16/24  0516 09/16/24  0735 09/16/24  1113 09/16/24  1521 09/16/24  2119 09/17/24  0742 09/17/24  1329 09/17/24  2215 09/18/24  0743   POCTGLUCOSE 192* 198* 208* 203* 267* 257* 223* 177* 227* 210*       Current Medications and/or Treatments Impacting Glycemic Control  Immunotherapy:    Immunosuppressants       None          Steroids:   Hormones (From admission, onward)      None          Pressors:    Autonomic Drugs (From admission, onward)      None          Hyperglycemia/Diabetes Medications:   Antihyperglycemics (From admission, onward)      Start     Stop Route Frequency Ordered    09/18/24 2100  insulin glargine U-100 (Lantus) pen 28 Units         -- SubQ Nightly 09/18/24 0902    09/17/24 1130  insulin aspart U-100 pen 4-8 Units         -- SubQ 3 times daily with meals 09/17/24 0815    09/13/24 0830  insulin aspart " U-100 pen 0-10 Units         -- SubQ Every 4 hours PRN 09/13/24 0731            ASSESSMENT and PLAN    Renal/  DEEPALI (acute kidney injury)  Titrate insulin slowly to avoid hypoglycemia as the risk of hypoglycemia increases with decreased creatinine clearance.  Estimated Creatinine Clearance: 66 mL/min (based on SCr of 1.2 mg/dL).        Endocrine  Uncontrolled diabetes mellitus with hyperglycemia  Endocrinology consulted for BG management.   BG goal 140-180    - Lantus (Insulin Glargine 28 units nightly   - Novolog 4-8 with meals  - [facility] SSI (150/25)  - BG checks q4hr  - Hypoglycemia protocol in place    ** Please notify Endocrine for any change and/or advance in diet**  ** Please call Endocrine for any BG related issues **    Discharge Planning:   TBD. Please notify endocrinology prior to discharge.        Orthopedic  * Surgical wound breakdown  Optimize BG control to improve wound healing  Managed per primary team               Brandyn Malin, DNP, FNP  Endocrinology  [facility] - [facility]

## 2024-09-18 NOTE — PT/OT/SLP EVAL
Physical Therapy Co-Evaluation and Treatment    OT present for coeval due to pt's multiple medical comorbidities and functional/cognition deficits requiring two skilled therapists to appropriately progress pt's musculoskeletal strength, neuromuscular control, and endurance while taking into consideration medical acuity and pt safety.    Patient Name:  Cortes Schroeder   MRN:  [id number]    Recommendations:     Discharge Recommendations: Moderate Intensity Therapy   Discharge Equipment Recommendations: bedside commode   Barriers to discharge: None    Assessment:     Cortes Schroeder is a 64 y.o. male admitted with a medical diagnosis of Surgical wound breakdown.  He presents with the following impairments/functional limitations: weakness, impaired endurance, impaired self care skills, impaired functional mobility, impaired balance, gait instability, decreased lower extremity function, orthopedic precautions     Pt receptive and tolerated PT co-eval with OT well. Pt educated on NWB: right lower extremity precautions prior to start of treatment with pt verbalizing understanding. Pt needed mod A of 2 persons to perform sit <> strand from EOB and amb to bedside chair using RW to maintain NWB. Patient currently demonstrates a need for moderate intensity therapy on a daily basis post acute secondary to a decline in functional status due to surgical procedure    Rehab Prognosis: Good; patient would benefit from acute skilled PT services to address these deficits and reach maximum level of function.    Recent Surgery: Procedure(s) (LRB):  Angiogram Extremity Unilateral (Right)  REPLACEMENT, WOUND VAC (Right) 1 Day Post-Op    Plan:     During this hospitalization, patient to be seen 4 x/week to address the identified rehab impairments via gait training, therapeutic activities, therapeutic exercises, neuromuscular re-education, wheelchair management/training and progress toward the following goals:    Plan of Care Expires:   10/18/24    Subjective     Chief Complaint: R leg lower pain  Patient/Family Comments/goals: to go home  Pain/Comfort:  Pain Rating 1: 6/10  Location - Side 1: Right  Location - Orientation 1: lower  Location 1: leg  Pain Addressed 1: Reposition, Distraction    Patients cultural, spiritual, Orthodox conflicts given the current situation: no    Patient History:     Living Environment: P[place] in [place] lives with a roommate in [facility] with no AMY. Bathroom: walk-in shower with bench   Prior Level of Function: Mod I using platform walker and wheelchair  DME owned: [employer] walker, w/c  Caregiver Assistance: unknown at this time    Objective:     Communicated with RN prior to session.  Patient found HOB elevated with wound vac, peripheral IV, telemetry  upon PT entry to room.    General Precautions: Standard, fall, aspiration, dental soft  Orthopedic Precautions:RLE non weight bearing   Braces: N/A  Respiratory Status: Room air    Exams:  Gross Motor Coordination:  WFL  Sensation:    -       Impaired  Pt reports numbness to R lower leg from shin down  RLE ROM: WFL  RLE Strength: grossly 3/5  LLE ROM: WFL  LLE Strength: grossly 4-/5    Functional Mobility:    Bed Mobility:   Supine > Sit: stand by assistance  Scooting: stand by assistance    Transfers:   Sit <> Stand Transfer: moderate assistance and of 2 persons from EOB using rolling walker   Bed <> Chair: moderate assistance and of 2 persons using rolling walker     Balance:   Sitting balance: FAIR+: Maintains balance through MINIMAL excursions of active trunk motion  Standing balance:   POOR: Needs MODERATE assist to maintain  POOR: Needs MOD (moderate) assist during gait                 Gait:  Distance: ~4 shuffling steps/hops to bedside chair   Assistive Device: RW  Assistance Level: moderate assistance and of 2 persons  Gait Assessment: Pt able to maintain RLE NWB throughout amb with verbal cues. Pt took small hops/shuffling steps on LLE. No LOB      AM-PAC  6 CLICK MOBILITY  Total Score:13       Treatment & Education:  Pt educated on tip to reduce fall risk and safety with mobility and using call button for assistance from nursing staff with OOB mobility.  Pt educated on sitting up in chair throughout most of day  All questions answered within the scope of PT.  White board updated accordingly.    Patient left up in chair with all lines intact, call button in reach, and RN notified.    GOALS:   Multidisciplinary Problems       Physical Therapy Goals          Problem: Physical Therapy    Goal Priority Disciplines Outcome Goal Variances Interventions   Physical Therapy Goal     PT, PT/OT Progressing     Description: Goals to be met by: 10/18/24     Patient will increase functional independence with mobility by performin. Supine to sit with [place]  2. Sit to stand transfer with Contact Guard Assistance  3. Bed to chair transfer with Contact Guard Assistance using Rolling Walker  4. Gait  x 10 feet with Contact Guard Assistance using Rolling Walker.   5. Stand for 5 minutes with Stand-by Assistance using Rolling Walker    Patient has a mobility limitation that significantly impairs their ability to participate in one or more mobility related activities of daily living, including toileting. This deficit can be resolved by using a bedside commode. Patient demonstrates mobility limitations that will cause them to be confined to one room at home without bathroom access for up to 30 days. Using a bedside commode will greatly improve the patient's ability to participate in MRADLs.                           History:     Past Medical History:   Diagnosis Date    Anxiety 2012    Back pain 2012    Cataract     Chronic pain syndrome 2016    Diabetes type 2, controlled 2016    Diabetic retinopathy     DM (diabetes mellitus) 2012    DM (diabetes mellitus), type 2, uncontrolled 2013    Gastroesophageal reflux disease without esophagitis  02/20/2016    Hyperlipidemia 12/18/2012    Insomnia 08/07/2014    Neuropathy 11/16/2013       Past Surgical History:   Procedure Laterality Date    ANGIOGRAPHY OF LOWER EXTREMITY Right 9/17/2024    Procedure: Angiogram Extremity Unilateral;  Surgeon: GINA Morton II, MD;  Location: [facility] OR [address] [place] [address];  Service: Vascular;  Laterality: Right;  Fluro time 19.5 min  mGy 260.49    APPLICATION OF WOUND VACUUM-ASSISTED CLOSURE DEVICE Right 9/6/2024    Procedure: APPLICATION, WOUND VAC;  Surgeon: Moe Liz MD;  Location: [facility] OR [address] [place] [address];  Service: Orthopedics;  Laterality: Right;    APPLICATION, EXTERNAL FIXATION DEVICE, FOR ANKLE FRACTURE Right 7/18/2024    Procedure: APPLICATION, EXTERNAL FIXATION DEVICE, FOR ANKLE FRACTURE;  Surgeon: Moe Liz MD;  Location: [facility] OR [address] [place] [address];  Service: Orthopedics;  Laterality: Right;    ARTHROTOMY OF ANKLE Right 9/9/2024    Procedure: ARTHROTOMY, ANKLE;  Surgeon: Moe Liz MD;  Location: [facility] OR [address] [place] [address];  Service: Orthopedics;  Laterality: Right;    BACK SURGERY  2003    Lumbar Spine    CATARACT EXTRACTION      CLOSED REDUCTION, FRACTURE, ANKLE, TRIMALLEOLAR Right 7/18/2024    Procedure: CLOSED REDUCTION, FRACTURE, ANKLE, TRIMALLEOLAR;  Surgeon: Moe Liz MD;  Location: [facility] OR [address] [place] [address];  Service: Orthopedics;  Laterality: Right;    EPIDURAL STEROID INJECTION N/A 1/16/2019    Procedure: Injection, Steroid, Epidural Cervical;  Surgeon: Andrew Sinclair Jr., MD;  Location: [facility] ENDO;  Service: Pain Management;  Laterality: N/A;  Cervical Epidural Steroid Injection C7-T1    85134    Arrive @ 1240    EPIDURAL STEROID INJECTION N/A 2/20/2019    Procedure: Injection, Steroid, Epidural;  Surgeon: Andrew Sinclair Jr., MD;  Location: [facility] ENDO;  Service: Pain Management;  Laterality: N/A;  Lumbar Epidural Steroid Injection L4-5    99033    Arrive @ 1215 (requests latest time)    FIXATION OF SYNDESMOSIS OF ANKLE Right 7/26/2024    Procedure: FIXATION,  SYNDESMOSIS, ANKLE;  Surgeon: Moe Liz MD;  [facility];  Service: Orthopedics;  Laterality: Right;    INJECTION, SPINE, LUMBOSACRAL, TRANSFORAMINAL APPROACH Bilateral 6/14/2024    Procedure: Bilateral L5 Transforaminal Epidural Steroid Injections;  Surgeon: Andrew Sinclair Jr., MD;  Location: [facility] PAIN MANAGEMENT;  Service: Pain Management;  Laterality: Bilateral;  @1300(given)  ASA last 6/8  Check BG  MD Sign.    IRRIGATION AND DEBRIDEMENT Right 9/6/2024    Procedure: IRRIGATION AND DEBRIDEMENT;  Surgeon: Moe Liz MD;  [facility];  Service: Orthopedics;  Laterality: Right;    IRRIGATION AND DEBRIDEMENT OF LOWER EXTREMITY Right 9/9/2024    Procedure: IRRIGATION AND DEBRIDEMENT, LOWER EXTREMITY - WITH WOUND VAC CHANGE, RIGHT ANKLE;  Surgeon: Moe Liz MD;  [facility];  Service: Orthopedics;  Laterality: Right;  WITH WOUND VAC CHANGE, RIGHT ANKLE    OPEN REDUCTION AND INTERNAL FIXATION (ORIF) OF FRACTURE OF DISTAL RADIUS Left 7/19/2024    Procedure: ORIF, FRACTURE, RADIUS, DISTAL - LEFT;  Surgeon: Moe Liz MD;  Location: [facility] OR [address] [place] [address];  Service: Orthopedics;  Laterality: Left;  LEFT, SYNTHES, C ARM    OPEN REDUCTION AND INTERNAL FIXATION (ORIF) OF INJURY OF ANKLE Right 7/26/2024    Procedure: ORIF, ANKLE;  Surgeon: Moe Liz MD;  [facility];  Service: Orthopedics;  Laterality: Right;    REMOVAL OF EXTERNAL FIXATION DEVICE Right 7/26/2024    Procedure: REMOVAL, EXTERNAL FIXATION DEVICE;  Surgeon: Moe Liz MD;  [facility];  Service: Orthopedics;  Laterality: Right;    REPLACEMENT OF WOUND VACUUM-ASSISTED CLOSURE DEVICE Right 9/17/2024    Procedure: REPLACEMENT, WOUND VAC;  Surgeon: Moe Liz MD;  [facility];  Service: Orthopedics;  Laterality: Right;  wound vac dressing exchange       Time Tracking:     PT Received On: 09/18/24  PT Start Time: 0915     PT  Stop Time: 0945  PT Total Time (min): 30 min     Billable Minutes: Evaluation 15 and Gait Training 15      09/18/2024

## 2024-09-19 ENCOUNTER — ANESTHESIA EVENT (OUTPATIENT)
Dept: SURGERY | Facility: HOSPITAL | Age: 64
End: 2024-09-19
Payer: COMMERCIAL

## 2024-09-19 PROBLEM — E66.09 CLASS 1 OBESITY DUE TO EXCESS CALORIES WITH SERIOUS COMORBIDITY AND BODY MASS INDEX (BMI) OF 33.0 TO 33.9 IN ADULT: Status: RESOLVED | Noted: 2024-07-19 | Resolved: 2024-09-19

## 2024-09-19 PROBLEM — R13.12 OROPHARYNGEAL DYSPHAGIA: Status: ACTIVE | Noted: 2024-09-13

## 2024-09-19 PROBLEM — S52.502A CLOSED FRACTURE OF LEFT DISTAL RADIUS: Status: RESOLVED | Noted: 2024-07-18 | Resolved: 2024-09-19

## 2024-09-19 PROBLEM — K59.00 CONSTIPATION: Status: RESOLVED | Noted: 2024-09-13 | Resolved: 2024-09-19

## 2024-09-19 PROBLEM — E66.09 CLASS 1 OBESITY DUE TO EXCESS CALORIES WITH SERIOUS COMORBIDITY AND BODY MASS INDEX (BMI) OF 34.0 TO 34.9 IN ADULT: Status: ACTIVE | Noted: 2022-03-16

## 2024-09-19 PROBLEM — E66.811 CLASS 1 OBESITY DUE TO EXCESS CALORIES WITH SERIOUS COMORBIDITY AND BODY MASS INDEX (BMI) OF 33.0 TO 33.9 IN ADULT: Status: RESOLVED | Noted: 2024-07-19 | Resolved: 2024-09-19

## 2024-09-19 PROBLEM — R33.8 ACUTE RETENTION OF URINE: Status: RESOLVED | Noted: 2024-09-12 | Resolved: 2024-09-19

## 2024-09-19 PROBLEM — E66.811 CLASS 1 OBESITY DUE TO EXCESS CALORIES WITH SERIOUS COMORBIDITY AND BODY MASS INDEX (BMI) OF 34.0 TO 34.9 IN ADULT: Status: ACTIVE | Noted: 2022-03-16

## 2024-09-19 PROBLEM — E83.39 HYPERPHOSPHATEMIA: Status: RESOLVED | Noted: 2024-09-14 | Resolved: 2024-09-19

## 2024-09-19 PROBLEM — R00.0 SINUS TACHYCARDIA: Status: RESOLVED | Noted: 2024-09-12 | Resolved: 2024-09-19

## 2024-09-19 PROBLEM — R29.6 MULTIPLE FALLS: Status: RESOLVED | Noted: 2024-07-18 | Resolved: 2024-09-19

## 2024-09-19 LAB
ALBUMIN SERPL BCP-MCNC: 1.5 G/DL (ref 3.5–5.2)
ALP SERPL-CCNC: 195 U/L (ref 55–135)
ALT SERPL W/O P-5'-P-CCNC: 5 U/L (ref 10–44)
ANION GAP SERPL CALC-SCNC: 10 MMOL/L (ref 8–16)
AST SERPL-CCNC: 25 U/L (ref 10–40)
BILIRUB SERPL-MCNC: 0.3 MG/DL (ref 0.1–1)
BUN SERPL-MCNC: 24 MG/DL (ref 8–23)
CALCIUM SERPL-MCNC: 8.1 MG/DL (ref 8.7–10.5)
CHLORIDE SERPL-SCNC: 104 MMOL/L (ref 95–110)
CO2 SERPL-SCNC: 20 MMOL/L (ref 23–29)
CREAT SERPL-MCNC: 1.2 MG/DL (ref 0.5–1.4)
ERYTHROCYTE [DISTWIDTH] IN BLOOD BY AUTOMATED COUNT: 15.9 % (ref 11.5–14.5)
EST. GFR  (NO RACE VARIABLE): >60 ML/MIN/1.73 M^2
GLUCOSE SERPL-MCNC: 264 MG/DL (ref 70–110)
HCT VFR BLD AUTO: 23.5 % (ref 40–54)
HGB BLD-MCNC: 7.7 G/DL (ref 14–18)
MCH RBC QN AUTO: 27.8 PG (ref 27–31)
MCHC RBC AUTO-ENTMCNC: 32.8 G/DL (ref 32–36)
MCV RBC AUTO: 85 FL (ref 82–98)
PLATELET # BLD AUTO: 663 K/UL (ref 150–450)
PMV BLD AUTO: 9.3 FL (ref 9.2–12.9)
POCT GLUCOSE: 172 MG/DL (ref 70–110)
POCT GLUCOSE: 244 MG/DL (ref 70–110)
POCT GLUCOSE: 256 MG/DL (ref 70–110)
POCT GLUCOSE: 281 MG/DL (ref 70–110)
POCT GLUCOSE: 304 MG/DL (ref 70–110)
POTASSIUM SERPL-SCNC: 4.5 MMOL/L (ref 3.5–5.1)
PROT SERPL-MCNC: 6.1 G/DL (ref 6–8.4)
RBC # BLD AUTO: 2.77 M/UL (ref 4.6–6.2)
SODIUM SERPL-SCNC: 134 MMOL/L (ref 136–145)
WBC # BLD AUTO: 12.21 K/UL (ref 3.9–12.7)

## 2024-09-19 PROCEDURE — 25000003 PHARM REV CODE 250: Performed by: STUDENT IN AN ORGANIZED HEALTH CARE EDUCATION/TRAINING PROGRAM

## 2024-09-19 PROCEDURE — 99232 SBSQ HOSP IP/OBS MODERATE 35: CPT | Mod: ,,, | Performed by: NURSE PRACTITIONER

## 2024-09-19 PROCEDURE — 97530 THERAPEUTIC ACTIVITIES: CPT

## 2024-09-19 PROCEDURE — 97535 SELF CARE MNGMENT TRAINING: CPT

## 2024-09-19 PROCEDURE — 80053 COMPREHEN METABOLIC PANEL: CPT | Performed by: HOSPITALIST

## 2024-09-19 PROCEDURE — 21400001 HC TELEMETRY ROOM

## 2024-09-19 PROCEDURE — 99499 UNLISTED E&M SERVICE: CPT | Mod: ,,, | Performed by: STUDENT IN AN ORGANIZED HEALTH CARE EDUCATION/TRAINING PROGRAM

## 2024-09-19 PROCEDURE — 36415 COLL VENOUS BLD VENIPUNCTURE: CPT | Performed by: HOSPITALIST

## 2024-09-19 PROCEDURE — 25000003 PHARM REV CODE 250: Performed by: HOSPITALIST

## 2024-09-19 PROCEDURE — 63600175 PHARM REV CODE 636 W HCPCS: Performed by: HOSPITALIST

## 2024-09-19 PROCEDURE — 63600175 PHARM REV CODE 636 W HCPCS: Performed by: STUDENT IN AN ORGANIZED HEALTH CARE EDUCATION/TRAINING PROGRAM

## 2024-09-19 PROCEDURE — 85027 COMPLETE CBC AUTOMATED: CPT | Performed by: HOSPITALIST

## 2024-09-19 RX ORDER — INSULIN GLARGINE 100 [IU]/ML
28 INJECTION, SOLUTION SUBCUTANEOUS DAILY
Status: DISCONTINUED | OUTPATIENT
Start: 2024-09-19 | End: 2024-09-20

## 2024-09-19 RX ORDER — LOSARTAN POTASSIUM 25 MG/1
50 TABLET ORAL DAILY
Status: DISCONTINUED | OUTPATIENT
Start: 2024-09-20 | End: 2024-10-04 | Stop reason: HOSPADM

## 2024-09-19 RX ADMIN — SODIUM BICARBONATE 650 MG TABLET 1300 MG: at 09:09

## 2024-09-19 RX ADMIN — OXACILLIN 12 G: 2 INJECTION, POWDER, FOR SOLUTION INTRAMUSCULAR; INTRAVENOUS at 05:09

## 2024-09-19 RX ADMIN — INSULIN ASPART 6 UNITS: 100 INJECTION, SOLUTION INTRAVENOUS; SUBCUTANEOUS at 12:09

## 2024-09-19 RX ADMIN — SUCRALFATE 1 G: 1 SUSPENSION ORAL at 12:09

## 2024-09-19 RX ADMIN — SENNOSIDES 8.6 MG: 8.6 TABLET, FILM COATED ORAL at 09:09

## 2024-09-19 RX ADMIN — INSULIN ASPART 4 UNITS: 100 INJECTION, SOLUTION INTRAVENOUS; SUBCUTANEOUS at 05:09

## 2024-09-19 RX ADMIN — SODIUM BICARBONATE 650 MG TABLET 1300 MG: at 02:09

## 2024-09-19 RX ADMIN — INSULIN ASPART 3 UNITS: 100 INJECTION, SOLUTION INTRAVENOUS; SUBCUTANEOUS at 12:09

## 2024-09-19 RX ADMIN — SUCRALFATE 1 G: 1 SUSPENSION ORAL at 06:09

## 2024-09-19 RX ADMIN — PANTOPRAZOLE SODIUM 40 MG: 40 TABLET, DELAYED RELEASE ORAL at 09:09

## 2024-09-19 RX ADMIN — INSULIN ASPART 8 UNITS: 100 INJECTION, SOLUTION INTRAVENOUS; SUBCUTANEOUS at 09:09

## 2024-09-19 RX ADMIN — INSULIN GLARGINE 28 UNITS: 100 INJECTION, SOLUTION SUBCUTANEOUS at 09:09

## 2024-09-19 RX ADMIN — POLYETHYLENE GLYCOL 3350 17 G: 17 POWDER, FOR SOLUTION ORAL at 09:09

## 2024-09-19 RX ADMIN — ENOXAPARIN SODIUM 40 MG: 40 INJECTION SUBCUTANEOUS at 06:09

## 2024-09-19 RX ADMIN — LOSARTAN POTASSIUM 25 MG: 25 TABLET, FILM COATED ORAL at 09:09

## 2024-09-19 RX ADMIN — GUAIFENESIN 600 MG: 600 TABLET, EXTENDED RELEASE ORAL at 09:09

## 2024-09-19 RX ADMIN — NORTRIPTYLINE HYDROCHLORIDE 50 MG: 25 CAPSULE ORAL at 09:09

## 2024-09-19 RX ADMIN — AMLODIPINE BESYLATE 10 MG: 10 TABLET ORAL at 09:09

## 2024-09-19 NOTE — ASSESSMENT & PLAN NOTE
Patient with issues with coughing and swallowing so Speech therapy consulted for evaluation. Patient evaluated and monitored and improved. Initially placed on soft diet but on 9/18 upgraded to regular diet with thin liquids as per Speech. Dysphagia resolved.

## 2024-09-19 NOTE — ASSESSMENT & PLAN NOTE
Cortes Schroeder is a 64 y.o. male with PMH of DM, HTN  and right trimalleolar ankle fracture status post ex fix on 07/18 with subsequent definitive fixation on 07/26 admitted with right ankle wound dehiscence in the setting of uncontrolled diabetes.      He is s/p I&D of R ankle 09/06. Repeat I&D R medial ankle 09/09. 9/17 angiography and medial ankle wound vac exchange.     To OR 9/20 for repeat I&D and vac exchange. Free flap pending plastics eval.     Labs: Cr improving (at baseline now)   Diet: NPO at midnight    Pain control: multimodal regimen  PT/OT:  NWB RLE   DVT PPx: Lovenox; hold DVT ppx tomorrow   Abx:  oxacillin continuous; ID following   Cultures:  ankle cx staph +    Dispo: I&D and vac exchange 9/20 + pending plastics eval for free flap

## 2024-09-19 NOTE — PROGRESS NOTES
[facility]  Endocrinology  Progress Note    Admit Date: 9/6/2024     Reason for Consult: Management of T2DM, Hyperglycemia      Surgical Procedure and Date: S/P irrigation debridement of RLE on 09/06/2024    Diabetes diagnosis year: 1995     Home Diabetes Medications:    Humalog via OmniPod insulin pump  Mounjaro 10 mg weekly     Current pump settings:  Basal 12 am to 2.3 and 6 am to 1.55  ICR to 3 at 12 am, 2 at 4 pm  ISF to 1:20   AIT 3 hrs  Target 110-120        Patient had anaphylaxis reaction to SGLT2 inhibitors specifically Invokana     How often checking glucose at home? Dexcom G6   BG readings on regimen: Average 210's per Dexcom  Hypoglycemia on the regimen?  Yes  Missed doses on regimen?  No     Diabetes Complications include:     Hyperglycemia and Diabetic peripheral neuropathy         Complicating diabetes co morbidities:   HLD, HTN, Obesity         HPI: 64 y.o. male presents to the ED w/ complaint of altered mental status. Hx of R ankle fracture with surgery over a month ago. Patient now presents with sepsis 2/2 R ankle infection s/p ORIF on 07/26. Started on IV vanc and cefepime. Given IVFs. Blood cultures pending. Brought to OR with ortho on 09/06 for irrigation debridement of RLE. Endocrine following for BG and type 2 diabetes.     Lab Results   Component Value Date    LABA1C 10.8 (H) 08/15/2016    HGBA1C 8.5 (H) 09/06/2024         Interval HPI:   Overnight events: No acute events overnight. Patient in room 542/542 A. Blood glucose improving. BG at and above goal on current insulin regimen (SSI, prandial, and basal insulin ). Steroid use- None. 2 Days Post-Op  Renal function- Normal   Vasopressors-  None     Of note, Lantus held overnight (endocrine not notified and there is not documentation in the chart as to why the Lantus was held) resulting in BG excursion into the 300's this morning. Re-timed Lantus to be given this morning,.    Endocrine will continue to follow and manage insulin  "orders inpatient.         Diet NPO Except for: Sips with Medication  Diet diabetic 2000 Calorie; Thin     Eatin%  Nausea: No  Hypoglycemia and intervention: No  Fever: No  TPN and/or TF: No      BP (!) 171/81   Pulse 98   Temp 98.9 °F (37.2 °C)   Resp (!) 22   Ht 5' 5" (1.651 m)   Wt 93 kg (205 lb 0.4 oz)   SpO2 97%   BMI 34.12 kg/m²     Labs Reviewed and Include    Recent Labs   Lab 24  0256   *   CALCIUM 8.1*   ALBUMIN 1.5*   PROT 6.1   *   K 4.5   CO2 20*      BUN 24*   CREATININE 1.2   ALKPHOS 195*   ALT 5*   AST 25   BILITOT 0.3     Lab Results   Component Value Date    WBC 12.21 2024    HGB 7.7 (L) 2024    HCT 23.5 (L) 2024    MCV 85 2024     (H) 2024     No results for input(s): "TSH", "FREET4" in the last 168 hours.  Lab Results   Component Value Date    HGBA1C 8.5 (H) 2024       Nutritional status:   Body mass index is 34.12 kg/m².  Lab Results   Component Value Date    ALBUMIN 1.5 (L) 2024    ALBUMIN 1.4 (L) 2024    ALBUMIN 1.4 (L) 2024     Lab Results   Component Value Date    PREALBUMIN 3 (L) 2024    PREALBUMIN 13 (L) 2024       Estimated Creatinine Clearance: 66 mL/min (based on SCr of 1.2 mg/dL).    Accu-Checks  Recent Labs     24  1521 24  2119 24  0742 24  1329 24  2215 24  0743 24  1150 24  1557 24  0009 24  0718   POCTGLUCOSE 267* 257* 223* 177* 227* 210* 282* 235* 256* 304*       Current Medications and/or Treatments Impacting Glycemic Control  Immunotherapy:    Immunosuppressants       None          Steroids:   Hormones (From admission, onward)      None          Pressors:    Autonomic Drugs (From admission, onward)      None          Hyperglycemia/Diabetes Medications:   Antihyperglycemics (From admission, onward)      Start     Stop Route Frequency Ordered    24 0900  insulin glargine U-100 (Lantus) pen 28 Units         " -- SubQ Daily 09/19/24 0810    09/17/24 1130  insulin aspart U-100 pen 4-8 Units         -- SubQ 3 times daily with meals 09/17/24 0815    09/13/24 0830  insulin aspart U-100 pen 0-10 Units         -- SubQ Every 4 hours PRN 09/13/24 0731            ASSESSMENT and PLAN    Renal/  Acute renal failure with tubular necrosis  Titrate insulin slowly to avoid hypoglycemia as the risk of hypoglycemia increases with decreased creatinine clearance.  Estimated Creatinine Clearance: 66 mL/min (based on SCr of 1.2 mg/dL).        Endocrine  Uncontrolled diabetes mellitus with hyperglycemia  Endocrinology consulted for BG management.   BG goal 140-180    - Lantus (Insulin Glargine 28 units daily  - Novolog 4-8 with meals (Administer 4   units if patient eats 25-50% of meal, administer 8   units if patient eats > 50% of meal.)   - [facility] SSI (150/25)  - BG checks q4hr  - Hypoglycemia protocol in place    ** Please notify Endocrine for any change and/or advance in diet**  ** Please call Endocrine for any BG related issues **    Discharge Planning:   TBD. Please notify endocrinology prior to discharge.        Orthopedic  * Surgical wound breakdown  Optimize BG control to improve wound healing  Managed per primary team               Brandyn Malin, DNP, FNP  Endocrinology  [facility]

## 2024-09-19 NOTE — ANESTHESIA PREPROCEDURE EVALUATION
[facility]  Anesthesia Pre-Operative Evaluation         Patient Name: Cortes Schroeder  YOB: 1960  MRN: [id number]    SUBJECTIVE:     Pre-operative Evaluation for Procedure(s) (LRB):  REPLACEMENT, WOUND VAC + IRRIGATION & DEBRIDEMENT - Right Ankle, Cysto, White sponge/Black sponge, Hydrocolloid (Right)     09/20/2024    Cortes Schroeder is a 64 y.o. male with a PMHx significant for 64-year-old man with type 2 diabetes on insulin pump, hypertension, morbid obesity, , GERD, HLD, chronic pain syndrome presents to the emergency department after multiple falls with traumatic injury after syncope.     *paper consent signed by patient  *     Previous Airway:     Intubated:  Postinduction    Mask Ventilation:  Easy with oral airway    Method of Intubation:  Video laryngoscopy    Blade:  Bravo 3    Laryngeal View Grade: Grade I - full view of cords      Difficult Airway Encountered?: No      Airway Device Size:  7.5    Style/Cuff Inflation:  Cuffed (inflated to minimal occlusive pressure)    Tube secured:  24    Secured at:  The lips    Placement Verified By:  Capnometry    LDA:        Peripheral IV - Single Lumen 09/10/24 1552 20 G 1 3/4 in Yes Anterior;Left Upper Arm (Active)   Site Assessment Clean;Dry;Intact;No redness;No swelling 09/18/24 1600   Extremity Assessment Distal to IV No warmth;No swelling;No redness;No abnormal discoloration 09/18/24 0800   Line Status Infusing 09/18/24 1600   Dressing Status Clean;Dry;Intact 09/18/24 1600   Dressing Intervention Integrity maintained 09/19/24 0400   Dressing Change Due 09/19/24 09/18/24 0800   Site Change Due 09/19/24 09/18/24 0800   Reason Not Rotated Not due 09/18/24 0800   Number of days: 8       Drips: None documented.        Patient Active Problem List   Diagnosis    Hyperlipidemia    Anxiety    Chronic bilateral low back pain without sciatica    Neuropathy    Uncontrolled type 2 diabetes mellitus with diabetic polyneuropathy, with long-term  current use of insulin    NPDR (nonproliferative diabetic retinopathy)    Insomnia    Metabolic acidosis    Acute hypoxemic respiratory failure    Gastroesophageal reflux disease without esophagitis    Chronic pain syndrome    Acute renal failure with tubular necrosis    Diabetic nephropathy associated with type 2 diabetes mellitus    Right sided weakness    Cervical syndrome    Chronic neck pain    Muscle weakness    Impaired motor control    Decreased range of motion (ROM) of shoulder    Cervical spondylosis without myelopathy    Neuroforaminal stenosis of cervical spine    Right cervical radiculopathy    Cervical radiculopathy    DDD (degenerative disc disease), lumbar    Insulin pump status    Class 1 obesity due to excess calories with serious comorbidity and body mass index (BMI) of 34.0 to 34.9 in adult    High risk homosexual behavior    Lumbar radiculopathy    Lumbar spondylosis    Left lumbar radiculitis    Closed trimalleolar fracture of right ankle    Hypertension, essential    Polyneuropathy due to type 2 diabetes mellitus    Uncontrolled type 2 diabetes mellitus with hyperglycemia    Hyponatremia    Abnormal thyroid blood test    Surgical wound breakdown    Surgical site infection    MSSA bacteremia    On pre-exposure prophylaxis for HIV    Thrombocytosis    Transaminitis    Acute blood loss anemia    PAD (peripheral artery disease)    Airway malacia    Oropharyngeal dysphagia       Review of patient's allergies indicates:   Allergen Reactions    Invokana [canagliflozin] Anaphylaxis    Percocet [oxycodone-acetaminophen] Nausea Only and Hallucinations    Biaxin [clarithromycin]     Hydrocodone Other (See Comments)     Dizzy/nausea/hallucinations    Sulfa (sulfonamide antibiotics) Nausea Only and Rash       Current Inpatient Medications:   amLODIPine  10 mg Oral Daily    enoxaparin  40 mg Subcutaneous Daily    guaiFENesin  600 mg Oral BID    insulin aspart U-100  2-8 Units Subcutaneous TIDWM    [START ON  9/21/2024] insulin glargine U-100  34 Units Subcutaneous Daily    losartan  50 mg Oral Daily    nortriptyline  50 mg Oral Nightly    oxacillin 12 g in  mL CONTINUOUS INFUSION  12 g Intravenous Q24H    pantoprazole  40 mg Oral Daily    polyethylene glycol  17 g Oral BID    senna  8.6 mg Oral BID    sodium bicarbonate  1,300 mg Oral TID    sucralfate  1 g Oral Q6H       Past Surgical History:   Procedure Laterality Date    ANGIOGRAPHY OF LOWER EXTREMITY Right 9/17/2024    Procedure: Angiogram Extremity Unilateral;  Surgeon: GINA Morton II, MD;  Location: [facility] OR [address] [place] [address];  Service: Vascular;  Laterality: Right;  Fluro time 19.5 min  mGy 260.49    APPLICATION OF WOUND VACUUM-ASSISTED CLOSURE DEVICE Right 9/6/2024    Procedure: APPLICATION, WOUND VAC;  Surgeon: Moe Liz MD;  Location: [facility] OR [address] [place] [address];  Service: Orthopedics;  Laterality: Right;    APPLICATION, EXTERNAL FIXATION DEVICE, FOR ANKLE FRACTURE Right 7/18/2024    Procedure: APPLICATION, EXTERNAL FIXATION DEVICE, FOR ANKLE FRACTURE;  Surgeon: Moe Liz MD;  Location: [facility] OR [address] [place] [address];  Service: Orthopedics;  Laterality: Right;    ARTHROTOMY OF ANKLE Right 9/9/2024    Procedure: ARTHROTOMY, ANKLE;  Surgeon: Moe Liz MD;  [facility];  Service: Orthopedics;  Laterality: Right;    BACK SURGERY  2003    Lumbar Spine    CATARACT EXTRACTION      CLOSED REDUCTION, FRACTURE, ANKLE, TRIMALLEOLAR Right 7/18/2024    Procedure: CLOSED REDUCTION, FRACTURE, ANKLE, TRIMALLEOLAR;  Surgeon: Moe Liz MD;  Location: [address];  Service: Orthopedics;  Laterality: Right;    EPIDURAL STEROID INJECTION N/A 1/16/2019    Procedure: Injection, Steroid, Epidural Cervical;  Surgeon: Andrew Sinclair Jr., MD;  Location: [facility];  Service: Pain Management;  Laterality: N/A;  Cervical Epidural Steroid Injection C7-T1    63691    Arrive @ 1240    EPIDURAL STEROID INJECTION N/A 2/20/2019    Procedure: Injection, Steroid,  Epidural;  Surgeon: Andrew Sinclair Jr., MD;  Location: [facility] ENDO;  Service: Pain Management;  Laterality: N/A;  Lumbar Epidural Steroid Injection L4-5    87418    Arrive @ 1215 (requests latest time)    FIXATION OF SYNDESMOSIS OF ANKLE Right 7/26/2024    Procedure: FIXATION, SYNDESMOSIS, ANKLE;  Surgeon: Moe Liz MD;  Location: [facility] OR [facility] FLR;  Service: Orthopedics;  Laterality: Right;    INJECTION, SPINE, LUMBOSACRAL, TRANSFORAMINAL APPROACH Bilateral 6/14/2024    Procedure: Bilateral L5 Transforaminal Epidural Steroid Injections;  Surgeon: Andrew Sinclair Jr., MD;  Location: [facility] PAIN MANAGEMENT;  Service: Pain Management;  Laterality: Bilateral;  @1300(given)  ASA last 6/8  Check BG  MD Sign.    IRRIGATION AND DEBRIDEMENT Right 9/6/2024    Procedure: IRRIGATION AND DEBRIDEMENT;  Surgeon: Moe Liz MD;  [facility];  Service: Orthopedics;  Laterality: Right;    IRRIGATION AND DEBRIDEMENT OF LOWER EXTREMITY Right 9/9/2024    Procedure: IRRIGATION AND DEBRIDEMENT, LOWER EXTREMITY - WITH WOUND VAC CHANGE, RIGHT ANKLE;  Surgeon: Moe Liz MD;  Location: [facility] OR [facility] FLR;  Service: Orthopedics;  Laterality: Right;  WITH WOUND VAC CHANGE, RIGHT ANKLE    OPEN REDUCTION AND INTERNAL FIXATION (ORIF) OF FRACTURE OF DISTAL RADIUS Left 7/19/2024    Procedure: ORIF, FRACTURE, RADIUS, DISTAL - LEFT;  Surgeon: Moe Liz MD;  Location: [facility] OR [facility] FLR;  Service: Orthopedics;  Laterality: Left;  LEFT, SYNTHES, C ARM    OPEN REDUCTION AND INTERNAL FIXATION (ORIF) OF INJURY OF ANKLE Right 7/26/2024    Procedure: ORIF, ANKLE;  Surgeon: Moe Liz MD;  Location: [facility] OR [facility] FLR;  Service: Orthopedics;  Laterality: Right;    REMOVAL OF EXTERNAL FIXATION DEVICE Right 7/26/2024    Procedure: REMOVAL, EXTERNAL FIXATION DEVICE;  Surgeon: Moe Liz MD;  Location: [facility] OR 2ND FLR;  Service: Orthopedics;  Laterality: Right;    REPLACEMENT OF WOUND VACUUM-ASSISTED  CLOSURE DEVICE Right 9/17/2024    Procedure: REPLACEMENT, WOUND VAC;  Surgeon: Moe Liz MD;  Location: [facility] OR [address] [place] [address];  Service: Orthopedics;  Laterality: Right;  wound vac dressing exchange       Social History     Substance and Sexual Activity   Drug Use No     Tobacco Use: Low Risk  (9/9/2024)    Patient History     Smoking Tobacco Use: Never     Smokeless Tobacco Use: Never     Passive Exposure: Not on file     Alcohol Use: Not At Risk (9/8/2024)    AUDIT-C     Frequency of Alcohol Consumption: Never     Average Number of Drinks: Patient does not drink     Frequency of Binge Drinking: Never       OBJECTIVE:     Vital Signs Range (Last 24H):  Temp:  [36.3 °C (97.3 °F)-37 °C (98.6 °F)]   Pulse:  []   Resp:  [17-26]   BP: (160-206)/(65-93)   SpO2:  [94 %-100 %]       Significant Labs    Heme Profile  Lab Results   Component Value Date    WBC 11.16 09/20/2024    HGB 7.8 (L) 09/20/2024    HCT 24.2 (L) 09/20/2024     (H) 09/20/2024       Coagulation Studies  Lab Results   Component Value Date    LABPROT 11.2 09/06/2024    INR 1.0 09/06/2024    APTT 29.9 09/06/2024       Metabolic Profile  Lab Results   Component Value Date     09/20/2024    K 4.0 09/20/2024     09/20/2024    CO2 23 09/20/2024    BUN 25 (H) 09/20/2024    CREATININE 1.1 09/20/2024    MG 2.5 09/14/2024    PHOS 3.4 09/18/2024       Liver Function Tests  Lab Results   Component Value Date    AST 21 09/20/2024    ALT 5 (L) 09/20/2024    ALKPHOS 199 (H) 09/20/2024    BILITOT 0.4 09/20/2024    PROT 6.2 09/20/2024    ALBUMIN 1.7 (L) 09/20/2024       Lipid Profile  Lab Results   Component Value Date    CHOL 104 09/23/2021    HDL 38 (L) 09/23/2021    TRIG 203 (H) 09/23/2021       Endocrine Profile  Lab Results   Component Value Date    HGBA1C 8.5 (H) 09/06/2024    TSH 6.243 (H) 07/18/2024       Diagnostic Studies      EKG:   Results for orders placed or performed during the hospital encounter of 09/06/24   EKG 12-lead     Collection Time: 09/08/24  9:39 AM   Result Value Ref Range    QRS Duration 72 ms    [facility] QTC Calculation 399 ms    Narrative    Test Reason : R00.0,    Vent. Rate : 114 BPM     Atrial Rate : 114 BPM     P-R Int : 208 ms          QRS Dur : 072 ms      QT Int : 290 ms       P-R-T Axes : 040 -11 044 degrees     QTc Int : 399 ms    Sinus tachycardia  Inferior infarct ,age undetermined  Abnormal ECG  When compared with ECG of 06-SEP-2024 02:27,  Criteria for Inferior infarct is now Present  Confirmed by Jolie Weiss MD ([id number]) on 9/8/2024 12:06:07 PM    Referred By: [email or web address]   SELF           Confirmed By:Jolie Weiss MD       TTE   Results for orders placed during the hospital encounter of 09/06/24    Echo    Interpretation Summary    Left Ventricle: The left ventricle is normal in size. Normal wall thickness. There is normal systolic function with a visually estimated ejection fraction of 65 - 70%. There is normal diastolic function.    Right Ventricle: Normal right ventricular cavity size. Wall thickness is normal. Systolic function is normal.    IVC/SVC: Normal venous pressure at 3 mmHg.    No evidence of intracardiac mass or vegetation.                  ASSESSMENT/PLAN:     Cortes Schroeder is a 64 y.o. male with  type 2 diabetes on insulin pump, hypertension, morbid obesity, , GERD, HLD,    Pre-op Assessment    I have reviewed the Patient Summary Reports.     I have reviewed the Nursing Notes. I have reviewed the NPO Status.   I have reviewed the Medications.     Review of Systems  Anesthesia Hx:  No problems with previous Anesthesia   History of prior surgery of interest to airway management or planning:            Denies Personal Hx of Anesthesia complications.                    Cardiovascular:     Hypertension                                        Renal/:  Chronic Renal Disease                Hepatic/GI:     GERD             Musculoskeletal:  Arthritis               Neurological:    Neuromuscular Disease,                                    Endocrine:  Diabetes         Obesity / BMI > 30      Physical Exam  General: Well nourished, Cooperative, Alert and Oriented  Facial hair   Airway:  Mallampati: III   Mouth Opening: Small, but > 3cm  TM Distance: Normal  Tongue: Normal  Neck ROM: Normal ROM    Dental:  Periodontal disease        Anesthesia Plan  Type of Anesthesia, risks & benefits discussed:    Anesthesia Type: Gen Natural Airway, Gen Supraglottic Airway, Regional  Intra-op Monitoring Plan: Standard ASA Monitors  Post Op Pain Control Plan: multimodal analgesia, IV/PO Opioids PRN and peripheral nerve block  Induction:  IV  Informed Consent: Informed consent signed with the Patient and all parties understand the risks and agree with anesthesia plan.  All questions answered.   ASA Score: 3  Day of Surgery Review of History & Physical: H&P Update referred to the surgeon/provider.    Ready For Surgery From Anesthesia Perspective.     .

## 2024-09-19 NOTE — ASSESSMENT & PLAN NOTE
I have reviewed hospital notes from   service and other specialty providers. I have also reviewed CBC, CMP/BMP,  cultures and imaging with my interpretation as documented.      64M with poorly controlled DM (A1C 8.5), recent fall resulting in left wrist and right ankle fractures 07/18/24 s/p ex fix Rt ankle and ORIF left wrist 07/19, with subsequent ORIF right ankle on 7/26/24 with Dr. Liz -- now c/b MSSA deep surgical site infection of right ankle involving underlying hardware with associated MSSA bacteremia.        Blood cultures 9/6 and 9/7 + for MSSA.  Repeat blood cultures 9/8, 9/11 NGTD.  TTE negative.      S/p I&D on 09/06 and 9/9.  Surgical cxs +GBS, MSSA.    On 09/17 underwent angio and wound vac exchange. S/p I&D with wound vac exchange 09/20 and 09/23. During washout 09/23, ortho removed all hardware, and noted evidence of poor wound healing  with extensive wound breakdown 2/2 PVD still despite having balloon angio 09/17. There is possibility pt may require BKA if continues to have poor healing.     Pt maintained on oxacillin. Remains AF, VSS, wbc normalized, LFTs nml, Cr 1.2 (returned to baseline). Plastics plans for closure 09/25.    Recommendations / Plan:  Continue IV oxacillin 12 g q 24 hours continuous infusion.     To complete abx duration of 6wks s/p last I&D 09/23, DON 11/04.  Will continue to follow along for ongoing abx management.    -- Discussed with ID staff and primary team   -- ID will continue to follow w/ further recs.

## 2024-09-19 NOTE — ASSESSMENT & PLAN NOTE
Patient's FSGs are not controlled on current hypoglycemics. Endocrine consulted and managing patient's diabetes in hospital and appreciate assistance. Patient on insulin pump at [facility] but not in hospital and on basal/prandial insulin in hospital as per Endocrine.   Last A1c reviewed-   Lab Results   Component Value Date    LABA1C 10.8 (H) 08/15/2016    HGBA1C 8.5 (H) 09/06/2024     Most recent fingerstick glucose reviewed-   Recent Labs   Lab 09/19/24  0009 09/19/24  0718 09/19/24  1116 09/19/24  1612   POCTGLUCOSE 256* 304* 281* 244*       Current correctional scale  Low  Maintain anti-hyperglycemic dose as follows-   Antihyperglycemics (From admission, onward)      Start     Stop Route Frequency Ordered    09/19/24 0900  insulin glargine U-100 (Lantus) pen 28 Units         -- SubQ Daily 09/19/24 0810    09/17/24 1130  insulin aspart U-100 pen 4-8 Units         -- SubQ 3 times daily with meals 09/17/24 0815    09/13/24 0830  insulin aspart U-100 pen 0-10 Units         -- SubQ Every 4 hours PRN 09/13/24 0731           - Endocrine consulted:  - At this time, recommend that the patient remain off of his pump since he cannot be controlled in the Auto mode. Patient does not interact with pump frequently and relies on the auto mode algorithm.   - Insulin dosing as per Endocrine recommendations.   - Hold oral antihyperglycemics during hospitalization   - Monitor blood sugars with meals and at bedtime in hospital.  - Target blood sugars 140-180 in hospital.  - Diabetic diet.

## 2024-09-19 NOTE — PROGRESS NOTES
.Plastic and Reconstructive Surgery   Progress Note    Subjective:    Strong doppler PT and moderate DP today. Nonpalpable. Foot warm and perfusing.      Objective:  Vital signs in last 24 hours:  Temp:  [97.8 °F (36.6 °C)-98.5 °F (36.9 °C)] 98.5 °F (36.9 °C)  Pulse:  [] 97  Resp:  [16] 16  SpO2:  [94 %-96 %] 94 %  BP: (161-195)/(74-86) 172/79    Intake/Output last 3 shifts:  I/O last 3 completed shifts:  In: 360 [P.O.:360]  Out: 2700 [Urine:2450; Other:250]    Intake/Output this shift:  No intake/output data recorded.        Physical Exam:  VITAL SIGNS:   Vitals:    24 2320 24 0021 24 0339 24 0418   BP: (!) 161/74  (!) 172/79    BP Location: Right arm  Right arm    Patient Position: Lying  Lying    Pulse: 99 100 100 97   Resp: 16  16    Temp: 98.5 °F (36.9 °C)  98.5 °F (36.9 °C)    TempSrc: Oral  Oral    SpO2: 95%  (!) 94%    Weight:       Height:         TMAX: Temp (24hrs), Av.4 °F (36.9 °C), Min:97.8 °F (36.6 °C), Max:98.5 °F (36.9 °C)      General: Alert; No acute distress  Cardiovascular: Regular rate   Respiratory: Normal respiratory effort. Chest rise symmetric.   Abdomen: Soft, nontender, nondistended  Extremity:  RLE vac in place with likely some revasc erythema, unable to appreciate palpable PT/DP, +++doppler PT Signal, + DP signal. +Neurologic: No focal deficit. Speech normal      Scheduled Medications amLODIPine, 10 mg, Daily  enoxaparin, 40 mg, Daily  guaiFENesin, 600 mg, BID  insulin aspart U-100, 4-8 Units, TIDWM  insulin glargine U-100, 28 Units, QHS  losartan, 25 mg, Daily  morphine, 2 mg, Once  nortriptyline, 50 mg, Nightly  oxacillin 12 g in  mL CONTINUOUS INFUSION, 12 g, Q24H  pantoprazole, 40 mg, Daily  polyethylene glycol, 17 g, BID  senna, 8.6 mg, BID  sodium bicarbonate, 1,300 mg, TID  sucralfate, 1 g, Q6H        PRN Medications     Current Facility-Administered Medications:     acetaminophen, 500 mg, Oral, Q8H PRN    albuterol-ipratropium, 3 mL,  Nebulization, Q4H PRN    calcium carbonate, 1,000 mg, Oral, TID PRN    dextrose 10%, 12.5 g, Intravenous, PRN    dextrose 10%, 12.5 g, Intravenous, PRN    dextrose 10%, 25 g, Intravenous, PRN    dextrose 10%, 25 g, Intravenous, PRN    glucagon (human recombinant), 1 mg, Intramuscular, PRN    glucose, 16 g, Oral, PRN    glucose, 24 g, Oral, PRN    hydrALAZINE, 25 mg, Oral, Q8H PRN    insulin aspart U-100, 0-10 Units, Subcutaneous, Q4H PRN    methocarbamoL, 500 mg, Oral, TID PRN    morphine, 2 mg, Intravenous, Q6H PRN    morphine, 4 mg, Intravenous, Q6H PRN    ondansetron, 8 mg, Oral, Q8H PRN    Recent Labs:   Lab Results   Component Value Date    WBC 12.21 09/19/2024    HGB 7.7 (L) 09/19/2024    HCT 23.5 (L) 09/19/2024    MCV 85 09/19/2024     (H) 09/19/2024     Lab Results   Component Value Date     (H) 09/19/2024     (L) 09/19/2024    K 4.5 09/19/2024     09/19/2024    BUN 24 (H) 09/19/2024         Assessment:   64 y.o. y/o male s/p Procedure(s):  IRRIGATION AND DEBRIDEMENT, LOWER EXTREMITY - WITH WOUND VAC CHANGE, RIGHT ANKLE  ARTHROTOMY, ANKLE      2 Days Post-Op     64 y.o.male W/ right ankle post-surgical medial wound dehiscence and exposed hardware.     Now s/p PTA RLE PT/AT, completion angiogram showing 3 vessel run off, also washout and vac exchange    Plan  Plan for OR for flap coverage next week after recovery from revascularization  - Ortho/Vasc/Endocrine/ID recs appreciated  - Continue to optimize nutrition   - Continue wound vac per Ortho and Abx per primary for now    Kristian Silva MD  Plastic Surgery Fellow  09/19/2024

## 2024-09-19 NOTE — ASSESSMENT & PLAN NOTE
Resolved. DEEPALI is likely due to ATN from contrast in combination with sepsis. Baseline creatinine is  1.0-1.2 . Most recent creatinine and eGFR are listed below.  Recent Labs     09/17/24  0533 09/18/24  0320 09/19/24  0256   CREATININE 1.3 1.2 1.2   EGFRNORACEVR >60.0 >60.0 >60.0        Plan  - Avoid nephrotoxins and renally dose meds for GFR listed above  - Monitor urine output, serial BMP, and adjust therapy as needed  - Patient with worsening creatinine after contrast studies and Cr peaked at 3.3 on 9/14.  Creatinine   Date Value Ref Range Status   09/19/2024 1.2 0.5 - 1.4 mg/dL Final   09/18/2024 1.2 0.5 - 1.4 mg/dL Final   09/17/2024 1.3 0.5 - 1.4 mg/dL Final   09/16/2024 1.8 (H) 0.5 - 1.4 mg/dL Final   09/15/2024 2.7 (H) 0.5 - 1.4 mg/dL Final     - Renal consulted. ATN noted on urine microscopy with casts, secondary to sepsis/contrast likely combination and recommended supportive care.   - Creatinine improved with supportive care and now back down to 1.2 on 9/19.   - Renally dose medications.   - Monitor daily BMP.

## 2024-09-19 NOTE — PROGRESS NOTES
[facility]  Orthopedics  Progress Note    Patient Name: Cortes Schroeder  MRN: [id number]  Admission Date: 9/6/2024  Hospital Length of Stay: 13 days  Attending Provider: Maria Del Rosario Medina MD  Primary Care Provider: Andrew Sinclair Jr., MD  Follow-up For: Procedure(s) (LRB):  Angiogram Extremity Unilateral (Right)  REPLACEMENT, WOUND VAC (Right)    Post-Operative Day: 2 Days Post-Op  Subjective:     Principal Problem:Surgical wound breakdown    Principal Orthopedic Problem: s/p I&D and wound vac placement on 09/06    Interval History: No issues overnight. RLE pulses remain dopplerable. Plan for vac exchange and I&D tomorrow 9/20. Will coordinate with plastics for timing of free flap.        Review of patient's allergies indicates:   Allergen Reactions    Invokana [canagliflozin] Anaphylaxis    Percocet [oxycodone-acetaminophen] Nausea Only and Hallucinations    Biaxin [clarithromycin]     Hydrocodone Other (See Comments)     Dizzy/nausea/hallucinations    Sulfa (sulfonamide antibiotics) Nausea Only and Rash       Current Facility-Administered Medications   Medication    acetaminophen tablet 500 mg    albuterol-ipratropium 2.5 mg-0.5 mg/3 mL nebulizer solution 3 mL    amLODIPine tablet 10 mg    calcium carbonate 200 mg calcium (500 mg) chewable tablet 1,000 mg    dextrose 10% bolus 125 mL 125 mL    dextrose 10% bolus 125 mL 125 mL    dextrose 10% bolus 250 mL 250 mL    dextrose 10% bolus 250 mL 250 mL    enoxaparin injection 40 mg    glucagon (human recombinant) injection 1 mg    glucose chewable tablet 16 g    glucose chewable tablet 24 g    guaiFENesin 12 hr tablet 600 mg    hydrALAZINE tablet 25 mg    insulin aspart U-100 pen 0-10 Units    insulin aspart U-100 pen 4-8 Units    insulin glargine U-100 (Lantus) pen 28 Units    losartan tablet 25 mg    methocarbamoL tablet 500 mg    morphine injection 2 mg    morphine injection 2 mg    morphine injection 4 mg    nortriptyline capsule 50 mg    ondansetron  "disintegrating tablet 8 mg    oxacillin 12 g in  mL CONTINUOUS INFUSION    pantoprazole EC tablet 40 mg    polyethylene glycol packet 17 g    senna tablet 8.6 mg    sodium bicarbonate tablet 1,300 mg    sucralfate 100 mg/mL suspension 1 g     Objective:     Vital Signs (Most Recent):  Temp: 98.5 °F (36.9 °C) (09/19/24 0339)  Pulse: 97 (09/19/24 0418)  Resp: 16 (09/19/24 0339)  BP: (!) 172/79 (09/19/24 0339)  SpO2: (!) 94 % (09/19/24 0339) Vital Signs (24h Range):  Temp:  [97.8 °F (36.6 °C)-98.5 °F (36.9 °C)] 98.5 °F (36.9 °C)  Pulse:  [] 97  Resp:  [16] 16  SpO2:  [94 %-96 %] 94 %  BP: (161-195)/(74-86) 172/79     Weight: 93 kg (205 lb 0.4 oz)  Height: 5' 5" (165.1 cm)  Body mass index is 34.12 kg/m².      Intake/Output Summary (Last 24 hours) at 9/19/2024 0511  Last data filed at 9/19/2024 0339  Gross per 24 hour   Intake 360 ml   Output 1800 ml   Net -1440 ml        Ortho/SPM Exam     AAOx4  NAD  Tachycardic  No increased WOB    RLE  Medial wound vac to good suction  Lateral side with fibrinous exudate and weeping ecchymosis distally  Compartments soft/compressible  Sensation diminished (at baseline)   Active toe dorsiflexion & plantarflexion  WWP. Brisk cap refill      Significant Labs:     Recent Results (from the past 336 hour(s))   Basic metabolic panel    Collection Time: 09/07/24  5:19 AM   Result Value Ref Range    Sodium 132 (L) 136 - 145 mmol/L    Potassium 4.0 3.5 - 5.1 mmol/L    Chloride 104 95 - 110 mmol/L    CO2 18 (L) 23 - 29 mmol/L    BUN 23 8 - 23 mg/dL    Creatinine 1.2 0.5 - 1.4 mg/dL    Calcium 8.5 (L) 8.7 - 10.5 mg/dL    Anion Gap 10 8 - 16 mmol/L         Significant Imaging: I have reviewed and interpreted all pertinent imaging results/findings.  CTA RLE: Multifocal high-grade stenoses throughout the right calf arteries. No arterial occlusion.    CTA chest: No large central PE identified  Assessment/Plan:     * Surgical wound breakdown  Cortes Schroeder is a 64 y.o. male with PMH " of DM, HTN  and right trimalleolar ankle fracture status post ex fix on 07/18 with subsequent definitive fixation on 07/26 admitted with right ankle wound dehiscence in the setting of uncontrolled diabetes.      He is s/p I&D of R ankle 09/06. Repeat I&D R medial ankle 09/09. 9/17 angiography and medial ankle wound vac exchange.     To OR 9/20 for repeat I&D and vac exchange. Free flap pending plastics eval.     Labs: Cr improving (at baseline now)   Diet: NPO at midnight    Pain control: multimodal regimen  PT/OT:  NWB RLE   DVT PPx: Lovenox; hold DVT ppx tomorrow   Abx:  oxacillin continuous; ID following   Cultures:  ankle cx staph +    Dispo: I&D and vac exchange 9/20 + plastics intervention for flap possibly Wed 9/25           Artur Galvan MD  Orthopedics  [facility] - Surgery

## 2024-09-19 NOTE — PT/OT/SLP PROGRESS
"Occupational Therapy   Treatment    Name: Cortes Schroeder  MRN: [id number]  Admitting Diagnosis:  Surgical wound breakdown  2 Days Post-Op    Recommendations:     Discharge Recommendations: Moderate Intensity Therapy  Discharge Equipment Recommendations:  bedside commode  Barriers to discharge:  Decreased caregiver support    Assessment:     Cortes Schroeder is a 64 y.o. male with a medical diagnosis of Surgical wound breakdown.  He presents with the following performance deficits affecting function are weakness, impaired endurance, impaired self care skills, impaired functional mobility, gait instability, impaired balance, orthopedic precautions, decreased lower extremity function. Pt presents with some anxiety regarding dx and foot; pt states he overall doesn't feel well but is unable to rate any pain. Pt had fair participation but required Mod encouragement     Rehab Prognosis:  Good; patient would benefit from acute skilled OT services to address these deficits and reach maximum level of function.       Plan:     Patient to be seen 4 x/week to address the above listed problems via self-care/home management, therapeutic activities, therapeutic exercises  Plan of Care Expires: 10/18/24  Plan of Care Reviewed with: patient    Subjective     Chief Complaint: "I just don't feel good"  Patient/Family Comments/goals: return home  Pain/Comfort:  Pain Rating 1: 0/10    Objective:     Communicated with: Nsg prior to session.  Patient found HOB elevated with wound vac, telemetry upon OT entry to room.    General Precautions: Standard, fall    Orthopedic Precautions:RLE non weight bearing  Braces: N/A  Respiratory Status: Room air     Occupational Performance:     Bed Mobility:    Patient completed Supine to Sit with stand by assistance  Patient completed Sit to Supine with stand by assistance     Functional Mobility/Transfers:  Functional Mobility: NT 2/2 pt wanting to remain in bed    Activities of Daily Living:  Feeding:  " supervision drinking water  Grooming: stand by assistance and setup A oral and facial care sitting EOB  Upper Body Dressing: independence doff gown; pt ashley area remained covered but stated he was hot.      [facility] 6 Click ADL: 19    Treatment & Education:  Pt educated on role and purpose of therapy  Pt educated on goal setting  Pt educated on benefits of OOB activity  Pt educated on self advocacy   Pt educated on NWB RLE precautions  Pt educated on mindfulness as distraction from pain and situation     Patient left supine with all lines intact, call button in reach, and nsg notified    GOALS:   Multidisciplinary Problems       Occupational Therapy Goals          Problem: Occupational Therapy    Goal Priority Disciplines Outcome Interventions   Occupational Therapy Goal     OT, PT/OT Progressing    Description: Goals to be met by: 10/18/24     Patient will increase functional independence with ADLs by performing:    UE Dressing with Supervision.  LE Dressing with Supervision.  Grooming while seated at sink with Set-up Assistance.  Toileting from toilet with Stand-by Assistance for hygiene and clothing management.   All functional transfers performed with SBA                         Time Tracking:     OT Date of Treatment: 09/19/24  OT Start Time: 0947  OT Stop Time: 1010  OT Total Time (min): 23 min    Billable Minutes:Self Care/Home Management 23    OT/ANA: OT          9/19/2024

## 2024-09-19 NOTE — ASSESSMENT & PLAN NOTE
Closed trimalleolar fracture of right ankle s/p ORIF in 7/2024  Surgical site infection  65 yo male presenting with confusion and worsening redness, swelling and pain to right ankle. Patient with sepsis criteria on admit and right ankle wound felt to be source of infection.     - Ortho consulted and patient taken to OR 09/06/2024 for debridement of surgical wound with wound vac placed. Cultures taken and grew both MSSA and Group B streptococcus from wound. Patient taken back again to OR with Ortho and patient had another I&D on 9/9/2024.  - Orthopedics recommended Plastics evaluation for free flap and as part of evaluation recommended Vascular evaluation. Vascular evaluated patient and angiogram of right leg done and PTA done and revascularization of right PT/AT. Plastics plan propeller flap on 9/25/2024. Per Plastics will need at least a week in hospital after flap for dangle protocols.  - Wound vac in place as per Ortho and back to OR again on 9/20 for I&D and wound vac change.  - Wound cultures -- MSSA and Group B strep from right ankle.   - Blood cultures -- MSSA on admit but repeat blood cultures negative.   - ID consulted and following and appreciate recs:  - Continue IV Oxacillin 12 g every 24 hours continuous infusion. Plan for orals if hardware retained once completed.  - Anticipate 6 weeks of IV antibiotics from date of most recent washout.

## 2024-09-19 NOTE — PLAN OF CARE
Problem: Adult Inpatient Plan of Care  Goal: Absence of Hospital-Acquired Illness or Injury  9/19/2024 1624 by Krystal Gold, LPN  Outcome: Progressing  9/19/2024 1624 by Krystal Gold, LPN  Outcome: Progressing  Goal: Optimal Comfort and Wellbeing  9/19/2024 1624 by Krystal Gold, LPN  Outcome: Progressing  9/19/2024 1624 by Krystal Gold, LPN  Outcome: Progressing  Goal: Readiness for Transition of Care  9/19/2024 1624 by Krystal Gold, LPN  Outcome: Progressing  9/19/2024 1624 by Krystal Gold, LPN  Outcome: Progressing     Problem: Fall Injury Risk  Goal: Absence of Fall and Fall-Related Injury  9/19/2024 1624 by Krystal Gold, LPN  Outcome: Progressing  9/19/2024 1624 by Krystal oGld, LPN  Outcome: Progressing     Problem: Sepsis/Septic Shock  Goal: Optimal Coping  9/19/2024 1624 by Krystal Gold, LPN  Outcome: Progressing  9/19/2024 1624 by Krystal Gold, LPN  Outcome: Progressing  Goal: Absence of Bleeding  9/19/2024 1624 by Krystal Gold, LPN  Outcome: Progressing  9/19/2024 1624 by Krystal Gold, LPN  Outcome: Progressing  Goal: Blood Glucose Level Within Targeted Range  9/19/2024 1624 by Krystal Gold, LPN  Outcome: Progressing  9/19/2024 1624 by Krystal Gold, LPN  Outcome: Progressing  Goal: Absence of Infection Signs and Symptoms  9/19/2024 1624 by Krystal Gold, LPN  Outcome: Progressing  9/19/2024 1624 by Krystal Gold, LPN  Outcome: Progressing  Goal: Optimal Nutrition Intake  9/19/2024 1624 by Krystal Gold, LPN  Outcome: Progressing  9/19/2024 1624 by PizzKrystal cornell LPN  Outcome: Progressing     Problem: Acute Kidney Injury/Impairment  Goal: Fluid and Electrolyte Balance  9/19/2024 1624 by Krystal Gold LPN  Outcome: Progressing  9/19/2024 1624 by Krystal Gold LPN  Outcome: Progressing  Goal: Improved Oral Intake  9/19/2024 1624 by Krystal Gold LPN  Outcome:  Progressing  9/19/2024 1624 by Krystal Gold, LPN  Outcome: Progressing  Goal: Effective Renal Function  9/19/2024 1624 by Krystal Gold, LPN  Outcome: Progressing  9/19/2024 1624 by Krystal Gold, LPN  Outcome: Progressing     Problem: Wound  Goal: Optimal Coping  9/19/2024 1624 by Krystal Gold, LPN  Outcome: Progressing  9/19/2024 1624 by Krystal Gold, LPN  Outcome: Progressing  Goal: Optimal Functional Ability  9/19/2024 1624 by Krystal Gold, LPN  Outcome: Progressing  9/19/2024 1624 by Krystal Gold, LPN  Outcome: Progressing  Goal: Absence of Infection Signs and Symptoms  9/19/2024 1624 by Krystal Gold, LPN  Outcome: Progressing  9/19/2024 1624 by Krystal Gold, LPN  Outcome: Progressing  Goal: Improved Oral Intake  9/19/2024 1624 by Krystal Gold, LPN  Outcome: Progressing  9/19/2024 1624 by Krystal Gold, LPN  Outcome: Progressing  Goal: Optimal Pain Control and Function  9/19/2024 1624 by Krystal Gold, LPN  Outcome: Progressing  9/19/2024 1624 by Krystal Gold, LPN  Outcome: Progressing  Goal: Skin Health and Integrity  9/19/2024 1624 by Krystal Gold, LPN  Outcome: Progressing  9/19/2024 1624 by Krystal Gold, LPN  Outcome: Progressing  Goal: Optimal Wound Healing  9/19/2024 1624 by Krystal Gold, LPN  Outcome: Progressing  9/19/2024 1624 by Krystal Gold, LPN  Outcome: Progressing     Problem: Diabetes Comorbidity  Goal: Blood Glucose Level Within Targeted Range  9/19/2024 1624 by Krystal Gold, LPN  Outcome: Progressing  9/19/2024 1624 by Krystal Gold LPN  Outcome: Progressing     Problem: Skin Injury Risk Increased  Goal: Skin Health and Integrity  9/19/2024 1624 by Krystal Gold LPN  Outcome: Progressing  9/19/2024 1624 by Krystal Gold LPN  Outcome: Progressing     Problem: Infection  Goal: Absence of Infection Signs and Symptoms  Outcome: Progressing    Pt sitting in  bed naked this am, states he was anxious, cbg done , pt had pulled out IV, order for stat ML placed to L arm , see flowsheet,pt not really wanting to eat today , tried to encourage to eat , see mar for med admin, pt up in chair with therapy today , pt voiding w/out difficulty, woundvac sealed and to suction RLE ,pt tolerating , vss, no distress, soft BM noted , cbg 244 prior to supper, see mar for insulin , pt drank 50% boost but no food

## 2024-09-19 NOTE — SUBJECTIVE & OBJECTIVE
Interval History: Patient to go back to OR again tomorrow for another irrigation and debridement and replacement of wound vac to right ankle wound by Ortho. Plastics plans propeller flap to right ankle on 9/25 and then will need to remain in hospital for 1 week after flap for dangling protocol and monitoring of flap per Plastics. Patient on IV Oxacillin to treat right ankle wound infection and MSSA bacteremia as per ID. DEEPALI resolved. Labs reviewed. Creatinine stable at 1.2 and was 1.2 yesterday. Hgb 7.7 and slightly down from 8.4 yesterday. Patient with no clinical signs of bleeding and will monitor. Sodium stable at 134 and was 134 yesterday. Blood sugars elevated at 256 -304 and Endocrine following and managing his diabetes and adjusting insulin. Patient reports pain in right ankle controlled and 3/10.     Review of Systems   Constitutional:  Negative for fever.   Respiratory:  Negative for cough and shortness of breath.    Cardiovascular:  Negative for chest pain.   Gastrointestinal:  Negative for nausea and vomiting.   Musculoskeletal:  Positive for arthralgias (Right ankle).   Psychiatric/Behavioral:  Negative for agitation and confusion.      Objective:     Vital Signs (Most Recent):  Temp: 98.6 °F (37 °C) (09/19/24 1615)  Pulse: 102 (09/19/24 1615)  Resp: 20 (09/19/24 1615)  BP: 166/65 (09/19/24 1615)  SpO2: 94 % (09/19/24 1615) on 2 liters of oxygen Vital Signs (24h Range):  Temp:  [98.3 °F (36.8 °C)-98.9 °F (37.2 °C)] 98.6 °F (37 °C)  Pulse:  [] 102  Resp:  [16-22] 20  SpO2:  [94 %-97 %] 94 %  BP: (160-172)/(65-81) 166/65     Weight: 93 kg (205 lb 0.4 oz)  Body mass index is 34.12 kg/m².    Intake/Output Summary (Last 24 hours) at 9/19/2024 1812  Last data filed at 9/19/2024 0339  Gross per 24 hour   Intake --   Output 450 ml   Net -450 ml         Physical Exam  Vitals and nursing note reviewed.   Constitutional:       General: He is awake. He is not in acute distress.     Appearance: Normal  appearance. He is well-developed. He is obese.      Interventions: Nasal cannula in place.      Comments: Patient sitting up in bed in no distress and in no apparent pain.    Eyes:      Conjunctiva/sclera: Conjunctivae normal.   Cardiovascular:      Rate and Rhythm: Normal rate and regular rhythm.      Heart sounds: Normal heart sounds. No murmur heard.  Pulmonary:      Effort: Pulmonary effort is normal. No respiratory distress.      Breath sounds: Normal breath sounds. No wheezing.   Abdominal:      General: Abdomen is flat. Bowel sounds are normal. There is no distension.      Palpations: Abdomen is soft.      Tenderness: There is no abdominal tenderness.   Musculoskeletal:      Left lower leg: No edema.      Comments: Wound vac in place to right ankle and ACE bandage overlying wound vac.    Skin:     General: Skin is warm.      Findings: No erythema.   Neurological:      Mental Status: He is alert and oriented to person, place, and time.   Psychiatric:         Mood and Affect: Mood normal.         Behavior: Behavior normal. Behavior is cooperative.         Thought Content: Thought content normal.         Judgment: Judgment normal.             Significant Labs: CBC:   Recent Labs   Lab 09/18/24  0320 09/19/24  0256   WBC 12.75* 12.21   HGB 8.4* 7.7*   HCT 25.3* 23.5*   * 663*     CMP:   Recent Labs   Lab 09/18/24  0320 09/19/24  0256   * 134*   K 3.9 4.5    104   CO2 20* 20*   * 264*   BUN 22 24*   CREATININE 1.2 1.2   CALCIUM 7.8* 8.1*   PROT 6.0 6.1   ALBUMIN 1.4* 1.5*   BILITOT 0.4 0.3   ALKPHOS 213* 195*   AST 20 25   ALT 5* 5*   ANIONGAP 9 10     Blood Sugars (AccuCheck):    Recent Labs     09/18/24  1150 09/18/24  1557 09/19/24  0009 09/19/24  0718 09/19/24  1116 09/19/24  1612   POCTGLUCOSE 282* 235* 256* 304* 281* 244*       Significant Imaging: I have reviewed all pertinent imaging results/findings within the past 24 hours.

## 2024-09-19 NOTE — ASSESSMENT & PLAN NOTE
Endocrinology consulted for BG management.   BG goal 140-180    - Lantus (Insulin Glargine 28 units daily  - Novolog 4-8 with meals (Administer 4   units if patient eats 25-50% of meal, administer 8   units if patient eats > 50% of meal.)   - [facility] SSI (150/25)  - BG checks q4hr  - Hypoglycemia protocol in place    ** Please notify Endocrine for any change and/or advance in diet**  ** Please call Endocrine for any BG related issues **    Discharge Planning:   TBD. Please notify endocrinology prior to discharge.

## 2024-09-19 NOTE — ASSESSMENT & PLAN NOTE
Resolved. Binder started 9/14 as elevated from DEEPALI and stopped 9/16 as deepali better as is phos

## 2024-09-19 NOTE — SUBJECTIVE & OBJECTIVE
Principal Problem:Surgical wound breakdown    Principal Orthopedic Problem: s/p I&D and wound vac placement on 09/06    Interval History: No issues overnight. RLE pulses remain dopplerable. Plan for vac exchange and I&D tomorrow 9/20. Will coordinate with plastics for timing of free flap.        Review of patient's allergies indicates:   Allergen Reactions    Invokana [canagliflozin] Anaphylaxis    Percocet [oxycodone-acetaminophen] Nausea Only and Hallucinations    Biaxin [clarithromycin]     Hydrocodone Other (See Comments)     Dizzy/nausea/hallucinations    Sulfa (sulfonamide antibiotics) Nausea Only and Rash       Current Facility-Administered Medications   Medication    acetaminophen tablet 500 mg    albuterol-ipratropium 2.5 mg-0.5 mg/3 mL nebulizer solution 3 mL    amLODIPine tablet 10 mg    calcium carbonate 200 mg calcium (500 mg) chewable tablet 1,000 mg    dextrose 10% bolus 125 mL 125 mL    dextrose 10% bolus 125 mL 125 mL    dextrose 10% bolus 250 mL 250 mL    dextrose 10% bolus 250 mL 250 mL    enoxaparin injection 40 mg    glucagon (human recombinant) injection 1 mg    glucose chewable tablet 16 g    glucose chewable tablet 24 g    guaiFENesin 12 hr tablet 600 mg    hydrALAZINE tablet 25 mg    insulin aspart U-100 pen 0-10 Units    insulin aspart U-100 pen 4-8 Units    insulin glargine U-100 (Lantus) pen 28 Units    losartan tablet 25 mg    methocarbamoL tablet 500 mg    morphine injection 2 mg    morphine injection 2 mg    morphine injection 4 mg    nortriptyline capsule 50 mg    ondansetron disintegrating tablet 8 mg    oxacillin 12 g in  mL CONTINUOUS INFUSION    pantoprazole EC tablet 40 mg    polyethylene glycol packet 17 g    senna tablet 8.6 mg    sodium bicarbonate tablet 1,300 mg    sucralfate 100 mg/mL suspension 1 g     Objective:     Vital Signs (Most Recent):  Temp: 98.5 °F (36.9 °C) (09/19/24 0339)  Pulse: 97 (09/19/24 0418)  Resp: 16 (09/19/24 0339)  BP: (!) 172/79 (09/19/24  "0339)  SpO2: (!) 94 % (09/19/24 0339) Vital Signs (24h Range):  Temp:  [97.8 °F (36.6 °C)-98.5 °F (36.9 °C)] 98.5 °F (36.9 °C)  Pulse:  [] 97  Resp:  [16] 16  SpO2:  [94 %-96 %] 94 %  BP: (161-195)/(74-86) 172/79     Weight: 93 kg (205 lb 0.4 oz)  Height: 5' 5" (165.1 cm)  Body mass index is 34.12 kg/m².      Intake/Output Summary (Last 24 hours) at 9/19/2024 0511  Last data filed at 9/19/2024 0339  Gross per 24 hour   Intake 360 ml   Output 1800 ml   Net -1440 ml        Ortho/SPM Exam     AAOx4  NAD  Tachycardic  No increased WOB    RLE  Medial wound vac to good suction  Lateral side with fibrinous exudate and weeping ecchymosis distally  Compartments soft/compressible  Sensation diminished (at baseline)   Active toe dorsiflexion & plantarflexion  WWP. Brisk cap refill      Significant Labs:     Recent Results (from the past 336 hour(s))   Basic metabolic panel    Collection Time: 09/07/24  5:19 AM   Result Value Ref Range    Sodium 132 (L) 136 - 145 mmol/L    Potassium 4.0 3.5 - 5.1 mmol/L    Chloride 104 95 - 110 mmol/L    CO2 18 (L) 23 - 29 mmol/L    BUN 23 8 - 23 mg/dL    Creatinine 1.2 0.5 - 1.4 mg/dL    Calcium 8.5 (L) 8.7 - 10.5 mg/dL    Anion Gap 10 8 - 16 mmol/L         Significant Imaging: I have reviewed and interpreted all pertinent imaging results/findings.  CTA RLE: Multifocal high-grade stenoses throughout the right calf arteries. No arterial occlusion.    CTA chest: No large central PE identified  "

## 2024-09-19 NOTE — ASSESSMENT & PLAN NOTE
- Blood cultures from admit grew MSSA. Repeat blood cultures done on 9/8 and 9/10 no growth.  - Infectious Disease consulted and patient placed on IV Oxacillin infusion as suspected source was right ankle surgical wound infection for bacteremia as grew MSSA from right ankle wound.   - Echo done without concern for vegetations.

## 2024-09-19 NOTE — PROGRESS NOTES
[facility]  Infectious Disease  Plan of care note    Patient Name: Cortes Schroeder  MRN: [id number]  Admission Date: 9/6/2024  Length of Stay: 13 days  Attending Physician: Raisa Kearns MD  Primary Care Provider: Andrew Sinclair Jr., MD    Isolation Status: No active isolations  Assessment/Plan:      ID  Surgical site infection  I have reviewed hospital notes from   service and other specialty providers. I have also reviewed CBC, CMP/BMP,  cultures and imaging with my interpretation as documented.      64M with poorly controlled DM (A1C 8.5), recent fall resulting in left wrist and right ankle fractures 07/18/24 s/p ex fix Rt ankle and ORIF left wrist 07/19, with subsequent ORIF right ankle on 7/26/24 with Dr. Liz -- now c/b MSSA deep surgical site infection of right ankle involving underlying hardware with associated MSSA bacteremia.        Blood cultures 9/6 and 9/7 + for MSSA.  Repeat blood cultures 9/8, 9/11 NGTD.  TTE negative.      S/p I&D on 09/06 and 9/9.  Surgical cxs +GBS, MSSA.    On 09/17 underwent angio and wound vac exchange.     Ortho plans for vac exchange and I&D tomorrow 9/20, and Plastics plans for closure next week 09/25.    Pt maintained on oxacillin. Remains AF, VSS, wbc normalized, LFTs nml, Cr 1.2 (returned to baseline).    Recommendations / Plan:  Continue IV oxacillin 12 g q 24 hours continuous infusion.     To complete abx duration of 6wks s/p last I&D 09/09, DON 10/21.   Will continue to follow along for ongoing abx management.  Will hold off adding Rifampin (for biofilm penetration) until tentative plastics flap/ closure,  to avoid further complications (intolerance, worsening DEEPALI, or potential DDI) or delays in surgical management.   So long as hardware remains, anticipate long term oral antibiotic suppression following course of IV abx, at-least 6mo.    -- Discussed with ID staff and primary team   -- ID will continue to follow w/ further recs.            Thank  you for your consult. I will follow-up with patient. Please contact us if you have any additional questions.    Andrew Kearns PA-C  Infectious Disease  [facility]    Subjective:     Principal Problem:Surgical wound breakdown    HPI: Cortes Schroeder is a 64 year old with HTN, poorly controlled DM (A1C 8.5), recent history of syncopal episodes, and  right ankle fracture on 7/18/24 s/p ex fix with subsequent ORIF on 7/26/24 with Dr. Liz.  At outpatient Orthopedic follow up surgical wound was healing and sutures removed.  Since then patient  reportedly doing well until about 1 week ago when he noticed some drainage from his surgical incisions.  Over the past week he developed fevers, chills, and night sweats.  Yesterday he became confused/altered and was brought to ED.  In ED was hypotensive, tachycardic, WBC 18, .  Sepsis protocol initiated and started on clindamycin has been doing well postoperatively until around 1 week ago when he started noticing some drainage from his surgical incisions. He started to develop fevers, chills and night sweats over the past week. Yesterday afternoon, patient became altered and was brought to the ED by his roommate. Patient currently lives in [place]. Upon presentation to the ED, patient was tachycardic, hypotensive and afebrile. WBC of 18.68, .3. Sepsis protocol initiated. Patient was started on clindamycin and vancomycin.  Now on vancomycin and cefepime.       Orthopedics consulted.  Noted draining wound over the medial side of the right ankle as well as eschar formation over the lateral side of the ankle.  Right ankle and foot noted to be erythematous and edematous.  Pending surgical I&D today.           No new subjective & objective note has been filed under this hospital service since the last note was generated.

## 2024-09-19 NOTE — ASSESSMENT & PLAN NOTE
I have reviewed hospital notes from   service and other specialty providers. I have also reviewed CBC, CMP/BMP,  cultures and imaging with my interpretation as documented.      64M with poorly controlled DM (A1C 8.5), recent fall resulting in left wrist and right ankle fractures 07/18/24 s/p ex fix Rt ankle and ORIF left wrist 07/19, with subsequent ORIF right ankle on 7/26/24 with Dr. Liz -- now c/b MSSA deep surgical site infection of right ankle involving underlying hardware with associated MSSA bacteremia.        S/p I&D on 09/06 and 9/9.  Op report noting purulence overlying hardware.  No plans for hardware removal at this time.  Unable to achieve primary closure and Plastic Surgery consulted.   A CTA of  lower extremity showed multifocal high grade stenosis throughout right calf, though no occlusion and Vascular Surgery consulted. Right TBI .41. Attempted angiogram 9/12, but developed extreme confusion/AMS and rapid respiratory rate  with versed/fentanyl and procedure postponed.   Further plans for angiogram still deferred for given DEEPALI (Nephrology following).   Plastics recommendations are pending Vascular workup.      Surgical cxs +GBS, MSSA.  Blood cultures 9/6 and 9/7 + for MSSA.  Repeat blood cultures 9/8, 9/11 NGTD.  TTE negative.  On 09/17 underwent angio and wound vac exchange.   Ortho plans for vac exchange and I&D tomorrow 9/20, and Plastics plans for closure next week 09/25.    Recommendations / Plan:  Continue IV oxacillin 12 g q 24 hours continuous infusion.     To complete abx duration of 6wks s/p last I&D 09/09, DON 10/21.   Will continue to follow along for ongoing abx management.  Will hold off adding Rifampin (for biofilm penetration) until tentative plastics flap/ closure,  to avoid further complications (intolerance, worsening DEEPALI, or potential DDI) or delays in surgical management.   So long as hardware remains, anticipate long term oral antibiotic suppression following course of IV  abx, at-least 6mo.    -- Discussed with ID staff and primary team   -- ID will continue to follow w/ further recs.

## 2024-09-19 NOTE — SUBJECTIVE & OBJECTIVE
"Interval HPI:   Overnight events: No acute events overnight. Patient in room 542/542 A. Blood glucose improving. BG at and above goal on current insulin regimen (SSI, prandial, and basal insulin ). Steroid use- None. 2 Days Post-Op  Renal function- Normal   Vasopressors-  None     Of note, Lantus held overnight (endocrine not notified and there is not documentation in the chart as to why the Lantus was held) resulting in BG excursion into the 300's this morning. Re-timed Lantus to be given this morning,.    Endocrine will continue to follow and manage insulin orders inpatient.         Diet NPO Except for: Sips with Medication  Diet diabetic 2000 Calorie; Thin     Eatin%  Nausea: No  Hypoglycemia and intervention: No  Fever: No  TPN and/or TF: No      BP (!) 171/81   Pulse 98   Temp 98.9 °F (37.2 °C)   Resp (!) 22   Ht 5' 5" (1.651 m)   Wt 93 kg (205 lb 0.4 oz)   SpO2 97%   BMI 34.12 kg/m²     Labs Reviewed and Include    Recent Labs   Lab 24  0256   *   CALCIUM 8.1*   ALBUMIN 1.5*   PROT 6.1   *   K 4.5   CO2 20*      BUN 24*   CREATININE 1.2   ALKPHOS 195*   ALT 5*   AST 25   BILITOT 0.3     Lab Results   Component Value Date    WBC 12.21 2024    HGB 7.7 (L) 2024    HCT 23.5 (L) 2024    MCV 85 2024     (H) 2024     No results for input(s): "TSH", "FREET4" in the last 168 hours.  Lab Results   Component Value Date    HGBA1C 8.5 (H) 2024       Nutritional status:   Body mass index is 34.12 kg/m².  Lab Results   Component Value Date    ALBUMIN 1.5 (L) 2024    ALBUMIN 1.4 (L) 2024    ALBUMIN 1.4 (L) 2024     Lab Results   Component Value Date    PREALBUMIN 3 (L) 2024    PREALBUMIN 13 (L) 2024       Estimated Creatinine Clearance: 66 mL/min (based on SCr of 1.2 mg/dL).    Accu-Checks  Recent Labs     24  1521 24  2119 24  0742 24  1329 24  2215 24  0743 24  1150 " 09/18/24  1557 09/19/24  0009 09/19/24  0718   POCTGLUCOSE 267* 257* 223* 177* 227* 210* 282* 235* 256* 304*       Current Medications and/or Treatments Impacting Glycemic Control  Immunotherapy:    Immunosuppressants       None          Steroids:   Hormones (From admission, onward)      None          Pressors:    Autonomic Drugs (From admission, onward)      None          Hyperglycemia/Diabetes Medications:   Antihyperglycemics (From admission, onward)      Start     Stop Route Frequency Ordered    09/19/24 0900  insulin glargine U-100 (Lantus) pen 28 Units         -- SubQ Daily 09/19/24 0810    09/17/24 1130  insulin aspart U-100 pen 4-8 Units         -- SubQ 3 times daily with meals 09/17/24 0815    09/13/24 0830  insulin aspart U-100 pen 0-10 Units         -- SubQ Every 4 hours PRN 09/13/24 0731

## 2024-09-20 ENCOUNTER — ANESTHESIA (OUTPATIENT)
Dept: SURGERY | Facility: HOSPITAL | Age: 64
End: 2024-09-20
Payer: COMMERCIAL

## 2024-09-20 PROBLEM — R13.12 OROPHARYNGEAL DYSPHAGIA: Status: RESOLVED | Noted: 2024-09-13 | Resolved: 2024-09-20

## 2024-09-20 LAB
ALBUMIN SERPL BCP-MCNC: 1.7 G/DL (ref 3.5–5.2)
ALP SERPL-CCNC: 199 U/L (ref 55–135)
ALT SERPL W/O P-5'-P-CCNC: 5 U/L (ref 10–44)
ANION GAP SERPL CALC-SCNC: 11 MMOL/L (ref 8–16)
AST SERPL-CCNC: 21 U/L (ref 10–40)
BILIRUB SERPL-MCNC: 0.4 MG/DL (ref 0.1–1)
BUN SERPL-MCNC: 25 MG/DL (ref 8–23)
CALCIUM SERPL-MCNC: 8.3 MG/DL (ref 8.7–10.5)
CHLORIDE SERPL-SCNC: 106 MMOL/L (ref 95–110)
CO2 SERPL-SCNC: 23 MMOL/L (ref 23–29)
CREAT SERPL-MCNC: 1.1 MG/DL (ref 0.5–1.4)
ERYTHROCYTE [DISTWIDTH] IN BLOOD BY AUTOMATED COUNT: 16.2 % (ref 11.5–14.5)
EST. GFR  (NO RACE VARIABLE): >60 ML/MIN/1.73 M^2
GLUCOSE SERPL-MCNC: 171 MG/DL (ref 70–110)
HCT VFR BLD AUTO: 24.2 % (ref 40–54)
HGB BLD-MCNC: 7.8 G/DL (ref 14–18)
MCH RBC QN AUTO: 28.3 PG (ref 27–31)
MCHC RBC AUTO-ENTMCNC: 32.2 G/DL (ref 32–36)
MCV RBC AUTO: 88 FL (ref 82–98)
PLATELET # BLD AUTO: 628 K/UL (ref 150–450)
PMV BLD AUTO: 9.6 FL (ref 9.2–12.9)
POCT GLUCOSE: 152 MG/DL (ref 70–110)
POCT GLUCOSE: 177 MG/DL (ref 70–110)
POCT GLUCOSE: 193 MG/DL (ref 70–110)
POCT GLUCOSE: 232 MG/DL (ref 70–110)
POTASSIUM SERPL-SCNC: 4 MMOL/L (ref 3.5–5.1)
PROT SERPL-MCNC: 6.2 G/DL (ref 6–8.4)
RBC # BLD AUTO: 2.76 M/UL (ref 4.6–6.2)
SODIUM SERPL-SCNC: 140 MMOL/L (ref 136–145)
WBC # BLD AUTO: 11.16 K/UL (ref 3.9–12.7)

## 2024-09-20 PROCEDURE — 37000009 HC ANESTHESIA EA ADD 15 MINS: Performed by: ORTHOPAEDIC SURGERY

## 2024-09-20 PROCEDURE — 63600175 PHARM REV CODE 636 W HCPCS: Performed by: STUDENT IN AN ORGANIZED HEALTH CARE EDUCATION/TRAINING PROGRAM

## 2024-09-20 PROCEDURE — 25000003 PHARM REV CODE 250: Performed by: STUDENT IN AN ORGANIZED HEALTH CARE EDUCATION/TRAINING PROGRAM

## 2024-09-20 PROCEDURE — 37000008 HC ANESTHESIA 1ST 15 MINUTES: Performed by: ORTHOPAEDIC SURGERY

## 2024-09-20 PROCEDURE — 94761 N-INVAS EAR/PLS OXIMETRY MLT: CPT

## 2024-09-20 PROCEDURE — 97605 NEG PRS WND THER DME<=50SQCM: CPT | Mod: ,,, | Performed by: ORTHOPAEDIC SURGERY

## 2024-09-20 PROCEDURE — 99232 SBSQ HOSP IP/OBS MODERATE 35: CPT | Mod: ,,, | Performed by: NURSE PRACTITIONER

## 2024-09-20 PROCEDURE — 36000706: Performed by: ORTHOPAEDIC SURGERY

## 2024-09-20 PROCEDURE — 63600175 PHARM REV CODE 636 W HCPCS: Performed by: NURSE ANESTHETIST, CERTIFIED REGISTERED

## 2024-09-20 PROCEDURE — 25000003 PHARM REV CODE 250: Performed by: NURSE ANESTHETIST, CERTIFIED REGISTERED

## 2024-09-20 PROCEDURE — 64447 NJX AA&/STRD FEMORAL NRV IMG: CPT

## 2024-09-20 PROCEDURE — 63600175 PHARM REV CODE 636 W HCPCS

## 2024-09-20 PROCEDURE — 63600175 PHARM REV CODE 636 W HCPCS: Mod: JZ,JG | Performed by: ANESTHESIOLOGY

## 2024-09-20 PROCEDURE — 27620 EXPLORE/TREAT ANKLE JOINT: CPT | Mod: 58,RT,, | Performed by: ORTHOPAEDIC SURGERY

## 2024-09-20 PROCEDURE — 63600175 PHARM REV CODE 636 W HCPCS: Performed by: HOSPITALIST

## 2024-09-20 PROCEDURE — 25000003 PHARM REV CODE 250: Performed by: INTERNAL MEDICINE

## 2024-09-20 PROCEDURE — 36415 COLL VENOUS BLD VENIPUNCTURE: CPT | Performed by: HOSPITALIST

## 2024-09-20 PROCEDURE — 99900035 HC TECH TIME PER 15 MIN (STAT)

## 2024-09-20 PROCEDURE — 71000015 HC POSTOP RECOV 1ST HR: Performed by: ORTHOPAEDIC SURGERY

## 2024-09-20 PROCEDURE — 80053 COMPREHEN METABOLIC PANEL: CPT | Performed by: HOSPITALIST

## 2024-09-20 PROCEDURE — 27201423 OPTIME MED/SURG SUP & DEVICES STERILE SUPPLY: Performed by: ORTHOPAEDIC SURGERY

## 2024-09-20 PROCEDURE — 25000003 PHARM REV CODE 250

## 2024-09-20 PROCEDURE — 0JBQ0ZZ EXCISION OF RIGHT FOOT SUBCUTANEOUS TISSUE AND FASCIA, OPEN APPROACH: ICD-10-PCS | Performed by: ORTHOPAEDIC SURGERY

## 2024-09-20 PROCEDURE — 85027 COMPLETE CBC AUTOMATED: CPT | Performed by: HOSPITALIST

## 2024-09-20 PROCEDURE — 36000707: Performed by: ORTHOPAEDIC SURGERY

## 2024-09-20 PROCEDURE — 63600175 PHARM REV CODE 636 W HCPCS: Performed by: ORTHOPAEDIC SURGERY

## 2024-09-20 PROCEDURE — 71000016 HC POSTOP RECOV ADDL HR: Performed by: ORTHOPAEDIC SURGERY

## 2024-09-20 PROCEDURE — 82962 GLUCOSE BLOOD TEST: CPT | Performed by: ORTHOPAEDIC SURGERY

## 2024-09-20 PROCEDURE — 3E1U38Z IRRIGATION OF JOINTS USING IRRIGATING SUBSTANCE, PERCUTANEOUS APPROACH: ICD-10-PCS | Performed by: ORTHOPAEDIC SURGERY

## 2024-09-20 PROCEDURE — 64445 NJX AA&/STRD SCIATIC NRV IMG: CPT

## 2024-09-20 PROCEDURE — 21400001 HC TELEMETRY ROOM

## 2024-09-20 PROCEDURE — 71000033 HC RECOVERY, INTIAL HOUR: Performed by: ORTHOPAEDIC SURGERY

## 2024-09-20 RX ORDER — PHENYLEPHRINE HYDROCHLORIDE 10 MG/ML
INJECTION INTRAVENOUS
Status: DISCONTINUED | OUTPATIENT
Start: 2024-09-20 | End: 2024-09-20

## 2024-09-20 RX ORDER — CLINDAMYCIN PHOSPHATE 900 MG/50ML
900 INJECTION, SOLUTION INTRAVENOUS
Status: DISCONTINUED | OUTPATIENT
Start: 2024-09-20 | End: 2024-09-20 | Stop reason: HOSPADM

## 2024-09-20 RX ORDER — FENTANYL CITRATE 50 UG/ML
25 INJECTION, SOLUTION INTRAMUSCULAR; INTRAVENOUS EVERY 5 MIN PRN
Status: DISCONTINUED | OUTPATIENT
Start: 2024-09-20 | End: 2024-09-20 | Stop reason: HOSPADM

## 2024-09-20 RX ORDER — INSULIN ASPART 100 [IU]/ML
0-10 INJECTION, SOLUTION INTRAVENOUS; SUBCUTANEOUS
Status: DISCONTINUED | OUTPATIENT
Start: 2024-09-20 | End: 2024-09-25

## 2024-09-20 RX ORDER — BUPIVACAINE HYDROCHLORIDE 5 MG/ML
INJECTION, SOLUTION EPIDURAL; INTRACAUDAL
Status: COMPLETED | OUTPATIENT
Start: 2024-09-20 | End: 2024-09-20

## 2024-09-20 RX ORDER — CEFAZOLIN SODIUM 1 G/3ML
INJECTION, POWDER, FOR SOLUTION INTRAMUSCULAR; INTRAVENOUS
Status: DISCONTINUED | OUTPATIENT
Start: 2024-09-20 | End: 2024-09-20

## 2024-09-20 RX ORDER — CHOLECALCIFEROL (VITAMIN D3) 25 MCG
1000 TABLET ORAL DAILY
Status: DISCONTINUED | OUTPATIENT
Start: 2024-09-20 | End: 2024-10-04 | Stop reason: HOSPADM

## 2024-09-20 RX ORDER — MUPIROCIN 20 MG/G
OINTMENT TOPICAL
Status: DISCONTINUED | OUTPATIENT
Start: 2024-09-20 | End: 2024-09-20 | Stop reason: HOSPADM

## 2024-09-20 RX ORDER — VANCOMYCIN HYDROCHLORIDE 1 G/20ML
INJECTION, POWDER, LYOPHILIZED, FOR SOLUTION INTRAVENOUS
Status: DISCONTINUED | OUTPATIENT
Start: 2024-09-20 | End: 2024-09-20 | Stop reason: HOSPADM

## 2024-09-20 RX ORDER — ACETAMINOPHEN 500 MG
1000 TABLET ORAL EVERY 8 HOURS
Status: DISCONTINUED | OUTPATIENT
Start: 2024-09-20 | End: 2024-10-02

## 2024-09-20 RX ORDER — PROPOFOL 10 MG/ML
VIAL (ML) INTRAVENOUS
Status: DISCONTINUED | OUTPATIENT
Start: 2024-09-20 | End: 2024-09-20

## 2024-09-20 RX ORDER — INSULIN ASPART 100 [IU]/ML
2-8 INJECTION, SOLUTION INTRAVENOUS; SUBCUTANEOUS
Status: DISCONTINUED | OUTPATIENT
Start: 2024-09-20 | End: 2024-09-26

## 2024-09-20 RX ORDER — INSULIN GLARGINE 100 [IU]/ML
34 INJECTION, SOLUTION SUBCUTANEOUS DAILY
Status: DISCONTINUED | OUTPATIENT
Start: 2024-09-21 | End: 2024-09-21

## 2024-09-20 RX ORDER — SODIUM CHLORIDE 0.9 % (FLUSH) 0.9 %
10 SYRINGE (ML) INJECTION
Status: DISCONTINUED | OUTPATIENT
Start: 2024-09-20 | End: 2024-09-20 | Stop reason: HOSPADM

## 2024-09-20 RX ORDER — LIDOCAINE HYDROCHLORIDE 20 MG/ML
INJECTION, SOLUTION EPIDURAL; INFILTRATION; INTRACAUDAL; PERINEURAL
Status: DISCONTINUED | OUTPATIENT
Start: 2024-09-20 | End: 2024-09-20

## 2024-09-20 RX ORDER — HYDROMORPHONE HYDROCHLORIDE 1 MG/ML
0.2 INJECTION, SOLUTION INTRAMUSCULAR; INTRAVENOUS; SUBCUTANEOUS EVERY 5 MIN PRN
Status: DISCONTINUED | OUTPATIENT
Start: 2024-09-20 | End: 2024-09-20 | Stop reason: HOSPADM

## 2024-09-20 RX ORDER — MIDAZOLAM HYDROCHLORIDE 1 MG/ML
.5-4 INJECTION, SOLUTION INTRAMUSCULAR; INTRAVENOUS
Status: DISCONTINUED | OUTPATIENT
Start: 2024-09-20 | End: 2024-09-20 | Stop reason: HOSPADM

## 2024-09-20 RX ORDER — FENTANYL CITRATE 50 UG/ML
25-200 INJECTION, SOLUTION INTRAMUSCULAR; INTRAVENOUS
Status: DISCONTINUED | OUTPATIENT
Start: 2024-09-20 | End: 2024-09-20 | Stop reason: HOSPADM

## 2024-09-20 RX ORDER — PROCHLORPERAZINE EDISYLATE 5 MG/ML
5 INJECTION INTRAMUSCULAR; INTRAVENOUS EVERY 30 MIN PRN
Status: DISCONTINUED | OUTPATIENT
Start: 2024-09-20 | End: 2024-09-20 | Stop reason: HOSPADM

## 2024-09-20 RX ORDER — FENTANYL CITRATE 50 UG/ML
INJECTION, SOLUTION INTRAMUSCULAR; INTRAVENOUS
Status: DISCONTINUED | OUTPATIENT
Start: 2024-09-20 | End: 2024-09-20

## 2024-09-20 RX ADMIN — GUAIFENESIN 600 MG: 600 TABLET, EXTENDED RELEASE ORAL at 09:09

## 2024-09-20 RX ADMIN — HYDRALAZINE HYDROCHLORIDE 25 MG: 25 TABLET ORAL at 04:09

## 2024-09-20 RX ADMIN — INSULIN ASPART 1 UNITS: 100 INJECTION, SOLUTION INTRAVENOUS; SUBCUTANEOUS at 12:09

## 2024-09-20 RX ADMIN — METHOCARBAMOL 500 MG: 500 TABLET ORAL at 08:09

## 2024-09-20 RX ADMIN — FENTANYL CITRATE 50 MCG: 50 INJECTION INTRAMUSCULAR; INTRAVENOUS at 09:09

## 2024-09-20 RX ADMIN — PHENYLEPHRINE HYDROCHLORIDE 200 MCG: 10 INJECTION INTRAVENOUS at 12:09

## 2024-09-20 RX ADMIN — PANTOPRAZOLE SODIUM 40 MG: 40 TABLET, DELAYED RELEASE ORAL at 08:09

## 2024-09-20 RX ADMIN — PROPOFOL 40 MG: 10 INJECTION, EMULSION INTRAVENOUS at 11:09

## 2024-09-20 RX ADMIN — LOSARTAN POTASSIUM 50 MG: 25 TABLET, FILM COATED ORAL at 08:09

## 2024-09-20 RX ADMIN — AMLODIPINE BESYLATE 10 MG: 10 TABLET ORAL at 08:09

## 2024-09-20 RX ADMIN — POLYETHYLENE GLYCOL 3350 17 G: 17 POWDER, FOR SOLUTION ORAL at 09:09

## 2024-09-20 RX ADMIN — MORPHINE SULFATE 4 MG: 4 INJECTION INTRAVENOUS at 08:09

## 2024-09-20 RX ADMIN — SUCRALFATE 1 G: 1 SUSPENSION ORAL at 05:09

## 2024-09-20 RX ADMIN — SODIUM BICARBONATE 650 MG TABLET 1300 MG: at 08:09

## 2024-09-20 RX ADMIN — BUPIVACAINE HYDROCHLORIDE 30 ML: 5 INJECTION, SOLUTION EPIDURAL; INTRACAUDAL at 10:09

## 2024-09-20 RX ADMIN — SODIUM CHLORIDE: 0.9 INJECTION, SOLUTION INTRAVENOUS at 10:09

## 2024-09-20 RX ADMIN — SODIUM BICARBONATE 650 MG TABLET 1300 MG: at 02:09

## 2024-09-20 RX ADMIN — CEFAZOLIN 2 G: 330 INJECTION, POWDER, FOR SOLUTION INTRAMUSCULAR; INTRAVENOUS at 11:09

## 2024-09-20 RX ADMIN — PROPOFOL 10 MG: 10 INJECTION, EMULSION INTRAVENOUS at 11:09

## 2024-09-20 RX ADMIN — INSULIN GLARGINE 28 UNITS: 100 INJECTION, SOLUTION SUBCUTANEOUS at 08:09

## 2024-09-20 RX ADMIN — ACETAMINOPHEN 1000 MG: 325 TABLET ORAL at 09:09

## 2024-09-20 RX ADMIN — PHENYLEPHRINE HYDROCHLORIDE 100 MCG: 10 INJECTION INTRAVENOUS at 11:09

## 2024-09-20 RX ADMIN — SODIUM BICARBONATE 650 MG TABLET 1300 MG: at 09:09

## 2024-09-20 RX ADMIN — CHOLECALCIFEROL TAB 25 MCG (1000 UNIT) 1000 UNITS: 25 TAB at 02:09

## 2024-09-20 RX ADMIN — MUPIROCIN: 20 OINTMENT TOPICAL at 09:09

## 2024-09-20 RX ADMIN — SENNOSIDES 8.6 MG: 8.6 TABLET, FILM COATED ORAL at 09:09

## 2024-09-20 RX ADMIN — INSULIN ASPART 2 UNITS: 100 INJECTION, SOLUTION INTRAVENOUS; SUBCUTANEOUS at 04:09

## 2024-09-20 RX ADMIN — FENTANYL CITRATE 25 MCG: 50 INJECTION, SOLUTION INTRAMUSCULAR; INTRAVENOUS at 11:09

## 2024-09-20 RX ADMIN — MORPHINE SULFATE 2 MG: 2 INJECTION, SOLUTION INTRAMUSCULAR; INTRAVENOUS at 12:09

## 2024-09-20 RX ADMIN — LIDOCAINE HYDROCHLORIDE 60 MG: 20 INJECTION, SOLUTION EPIDURAL; INFILTRATION; INTRACAUDAL at 11:09

## 2024-09-20 RX ADMIN — INSULIN ASPART 1 UNITS: 100 INJECTION, SOLUTION INTRAVENOUS; SUBCUTANEOUS at 10:09

## 2024-09-20 RX ADMIN — BUPIVACAINE HYDROCHLORIDE 20 ML: 5 INJECTION, SOLUTION EPIDURAL; INTRACAUDAL; PERINEURAL at 10:09

## 2024-09-20 RX ADMIN — INSULIN ASPART 4 UNITS: 100 INJECTION, SOLUTION INTRAVENOUS; SUBCUTANEOUS at 08:09

## 2024-09-20 RX ADMIN — ENOXAPARIN SODIUM 40 MG: 40 INJECTION SUBCUTANEOUS at 04:09

## 2024-09-20 RX ADMIN — NORTRIPTYLINE HYDROCHLORIDE 50 MG: 25 CAPSULE ORAL at 09:09

## 2024-09-20 RX ADMIN — PHENYLEPHRINE HYDROCHLORIDE 100 MCG: 10 INJECTION INTRAVENOUS at 12:09

## 2024-09-20 RX ADMIN — PROPOFOL 50 MG: 10 INJECTION, EMULSION INTRAVENOUS at 11:09

## 2024-09-20 RX ADMIN — OXACILLIN 12 G: 2 INJECTION, POWDER, FOR SOLUTION INTRAMUSCULAR; INTRAVENOUS at 04:09

## 2024-09-20 NOTE — ASSESSMENT & PLAN NOTE
Hyponatremia is likely due to Dehydration/hypovolemia. The patient's most recent sodium results are listed below.  Recent Labs     09/17/24  0533 09/18/24  0320 09/19/24  0256   * 134* 134*       Plan  - Correct the sodium by 4-6mEq in 24 hours.   - Encourage oral intake.   - Monitor sodium Daily. Patient asymptomatic.   - Patient hyponatremia is stable.

## 2024-09-20 NOTE — PROGRESS NOTES
[facility]  Orthopedics  Progress Note    Attg Note:  Patient seen and examined.  I agree with the resident's assessment and plan.  To OR today for repeat washout.    Moe Liz MD      Patient Name: Cortes Schroeder  MRN: [id number]  Admission Date: 9/6/2024  Hospital Length of Stay: 14 days  Attending Provider: Raisa Kearns MD  Primary Care Provider: Andrew Sinclair Jr., MD  Follow-up For: Procedure(s) (LRB):  Angiogram Extremity Unilateral (Right)  REPLACEMENT, WOUND VAC (Right)    Post-Operative Day: 3 Days Post-Op  Subjective:     Principal Problem:Surgical wound breakdown    Principal Orthopedic Problem: s/p I&D and wound vac placement on 09/06    Interval History: No issues overnight. RLE pulses remain dopplerable. NPO since midnight for vac exchange/I&D today.         Review of patient's allergies indicates:   Allergen Reactions    Invokana [canagliflozin] Anaphylaxis    Percocet [oxycodone-acetaminophen] Nausea Only and Hallucinations    Biaxin [clarithromycin]     Hydrocodone Other (See Comments)     Dizzy/nausea/hallucinations    Sulfa (sulfonamide antibiotics) Nausea Only and Rash       Current Facility-Administered Medications   Medication    acetaminophen tablet 500 mg    albuterol-ipratropium 2.5 mg-0.5 mg/3 mL nebulizer solution 3 mL    amLODIPine tablet 10 mg    calcium carbonate 200 mg calcium (500 mg) chewable tablet 1,000 mg    dextrose 10% bolus 125 mL 125 mL    dextrose 10% bolus 125 mL 125 mL    dextrose 10% bolus 250 mL 250 mL    dextrose 10% bolus 250 mL 250 mL    enoxaparin injection 40 mg    glucagon (human recombinant) injection 1 mg    glucose chewable tablet 16 g    glucose chewable tablet 24 g    guaiFENesin 12 hr tablet 600 mg    hydrALAZINE tablet 25 mg    insulin aspart U-100 pen 0-10 Units    insulin aspart U-100 pen 4-8 Units    insulin glargine U-100 (Lantus) pen 28 Units    losartan tablet 50 mg    methocarbamoL tablet 500 mg    morphine injection 2 mg     "morphine injection 4 mg    nortriptyline capsule 50 mg    ondansetron disintegrating tablet 8 mg    oxacillin 12 g in  mL CONTINUOUS INFUSION    pantoprazole EC tablet 40 mg    polyethylene glycol packet 17 g    senna tablet 8.6 mg    sodium bicarbonate tablet 1,300 mg    sucralfate 100 mg/mL suspension 1 g     Objective:     Vital Signs (Most Recent):  Temp: 97.9 °F (36.6 °C) (09/20/24 0426)  Pulse: 99 (09/20/24 0426)  Resp: 17 (09/20/24 0426)  BP: (!) 170/74 (09/20/24 0426)  SpO2: 95 % (09/20/24 0426) Vital Signs (24h Range):  Temp:  [97.9 °F (36.6 °C)-98.9 °F (37.2 °C)] 97.9 °F (36.6 °C)  Pulse:  [] 99  Resp:  [17-22] 17  SpO2:  [94 %-97 %] 95 %  BP: (160-186)/(65-93) 170/74     Weight: 93 kg (205 lb 0.4 oz)  Height: 5' 5" (165.1 cm)  Body mass index is 34.12 kg/m².    No intake or output data in the 24 hours ending 09/20/24 0641       Ortho/SPM Exam     AAOx4  NAD  Tachycardic  No increased WOB    RLE  Medial wound vac to good suction  Lateral side with fibrinous exudate and weeping ecchymosis distally  Compartments soft/compressible  Sensation diminished (at baseline)   Active toe dorsiflexion & plantarflexion  WWP. Brisk cap refill      Significant Labs:     Recent Results (from the past 336 hour(s))   Basic metabolic panel    Collection Time: 09/07/24  5:19 AM   Result Value Ref Range    Sodium 132 (L) 136 - 145 mmol/L    Potassium 4.0 3.5 - 5.1 mmol/L    Chloride 104 95 - 110 mmol/L    CO2 18 (L) 23 - 29 mmol/L    BUN 23 8 - 23 mg/dL    Creatinine 1.2 0.5 - 1.4 mg/dL    Calcium 8.5 (L) 8.7 - 10.5 mg/dL    Anion Gap 10 8 - 16 mmol/L         Significant Imaging: I have reviewed and interpreted all pertinent imaging results/findings.  CTA RLE: Multifocal high-grade stenoses throughout the right calf arteries. No arterial occlusion.    CTA chest: No large central PE identified  Assessment/Plan:     * Surgical wound breakdown  Cortes Schroeder is a 64 y.o. male with PMH of DM, HTN  and right " trimalleolar ankle fracture status post ex fix on 07/18 with subsequent definitive fixation on 07/26 admitted with right ankle wound dehiscence in the setting of uncontrolled diabetes.      He is s/p I&D of R ankle 09/06. Repeat I&D R medial ankle 09/09. 9/17 angiography and medial ankle wound vac exchange.     To OR 9/20 for repeat I&D and vac exchange. Free flap pending plastics eval.     Labs: Cr improving (at baseline now)   Diet: NPO at midnight    Pain control: multimodal regimen  PT/OT:  NWB RLE   DVT PPx: Lovenox; hold DVT ppx tomorrow   Abx:  oxacillin continuous; ID following   Cultures:  ankle cx staph +    Dispo: I&D and vac exchange 9/20 + pending plastics eval for free flap                 DEEPTHI Garcia MD  Orthopedics  [facility] - Surgery

## 2024-09-20 NOTE — ASSESSMENT & PLAN NOTE
Patient's FSGs are improving on current hypoglycemics. Endocrine consulted and managing patient's diabetes in hospital and appreciate assistance. Patient on insulin pump at [facility] but not in hospital and on basal/prandial insulin in hospital as per Endocrine.   Last A1c reviewed-   Lab Results   Component Value Date    LABA1C 10.8 (H) 08/15/2016    HGBA1C 8.5 (H) 09/06/2024     Most recent fingerstick glucose reviewed-   Recent Labs   Lab 09/20/24  0750 09/20/24  0942 09/20/24  1408 09/20/24  1614   POCTGLUCOSE 232* 223* 193* 177*     Current correctional scale  Low  Maintain anti-hyperglycemic dose as follows-   Antihyperglycemics (From admission, onward)    Start     Stop Route Frequency Ordered    09/21/24 0900  insulin glargine U-100 (Lantus) pen 34 Units         -- SubQ Daily 09/20/24 0844    09/20/24 1524  insulin aspart U-100 pen 0-10 Units         -- SubQ Before meals & nightly PRN 09/20/24 1424    09/20/24 1130  insulin aspart U-100 pen 2-8 Units         -- SubQ 3 times daily with meals 09/20/24 0844         - Endocrine consulted:  - At this time, recommend that the patient remain off of his pump since he cannot be controlled in the Auto mode. Patient does not interact with pump frequently and relies on the auto mode algorithm.   - Insulin dosing as per Endocrine recommendations.   - Hold oral antihyperglycemics during hospitalization   - Monitor blood sugars with meals and at bedtime in hospital.  - Target blood sugars 140-180 in hospital.  - Diabetic diet.

## 2024-09-20 NOTE — PROGRESS NOTES
.Plastic and Reconstructive Surgery   Progress Note    Subjective:    Foot warm and perfusing. Swelling subsiding      Objective:  Vital signs in last 24 hours:  Temp:  [97.8 °F (36.6 °C)-98.6 °F (37 °C)] 97.8 °F (36.6 °C)  Pulse:  [] 104  Resp:  [17-20] 18  SpO2:  [94 %-97 %] 97 %  BP: (160-186)/(65-93) 182/73    Intake/Output last 3 shifts:  I/O last 3 completed shifts:  In: -   Out: 450 [Urine:450]    Intake/Output this shift:  No intake/output data recorded.        Physical Exam:  VITAL SIGNS:   Vitals:    24 0001 24 0231 24 0426 24 0734   BP:   (!) 170/74 (!) 182/73   BP Location:   Left arm    Patient Position:   Lying    Pulse:  102 99 104   Resp: 18  17 18   Temp:   97.9 °F (36.6 °C) 97.8 °F (36.6 °C)   TempSrc:   Oral    SpO2:   95% 97%   Weight:       Height:         TMAX: Temp (24hrs), Av.2 °F (36.8 °C), Min:97.8 °F (36.6 °C), Max:98.6 °F (37 °C)      General: Alert; No acute distress  Cardiovascular: Regular rate   Respiratory: Normal respiratory effort. Chest rise symmetric.   Abdomen: Soft, nontender, nondistended  Extremity:  RLE vac in place with likely some revasc erythema, unable to appreciate palpable PT/DP, +++doppler PT Signal, + DP signal. +Neurologic: No focal deficit. Speech normal      Scheduled Medications amLODIPine, 10 mg, Daily  enoxaparin, 40 mg, Daily  guaiFENesin, 600 mg, BID  insulin aspart U-100, 4-8 Units, TIDWM  insulin glargine U-100, 28 Units, Daily  losartan, 50 mg, Daily  nortriptyline, 50 mg, Nightly  oxacillin 12 g in  mL CONTINUOUS INFUSION, 12 g, Q24H  pantoprazole, 40 mg, Daily  polyethylene glycol, 17 g, BID  senna, 8.6 mg, BID  sodium bicarbonate, 1,300 mg, TID  sucralfate, 1 g, Q6H        PRN Medications     Current Facility-Administered Medications:     acetaminophen, 500 mg, Oral, Q8H PRN    albuterol-ipratropium, 3 mL, Nebulization, Q4H PRN    calcium carbonate, 1,000 mg, Oral, TID PRN    dextrose 10%, 12.5 g, Intravenous,  PRN    dextrose 10%, 12.5 g, Intravenous, PRN    dextrose 10%, 25 g, Intravenous, PRN    dextrose 10%, 25 g, Intravenous, PRN    glucagon (human recombinant), 1 mg, Intramuscular, PRN    glucose, 16 g, Oral, PRN    glucose, 24 g, Oral, PRN    hydrALAZINE, 25 mg, Oral, Q8H PRN    insulin aspart U-100, 0-10 Units, Subcutaneous, Q4H PRN    methocarbamoL, 500 mg, Oral, TID PRN    morphine, 2 mg, Intravenous, Q6H PRN    morphine, 4 mg, Intravenous, Q6H PRN    ondansetron, 8 mg, Oral, Q8H PRN    Recent Labs:   Lab Results   Component Value Date    WBC 11.16 09/20/2024    HGB 7.8 (L) 09/20/2024    HCT 24.2 (L) 09/20/2024    MCV 88 09/20/2024     (H) 09/20/2024     Lab Results   Component Value Date     (H) 09/20/2024     09/20/2024    K 4.0 09/20/2024     09/20/2024    BUN 25 (H) 09/20/2024         Assessment:   64 y.o. y/o male s/p Procedure(s):  IRRIGATION AND DEBRIDEMENT, LOWER EXTREMITY - WITH WOUND VAC CHANGE, RIGHT ANKLE  ARTHROTOMY, ANKLE      3 Days Post-Op     64 y.o.male W/ right ankle post-surgical medial wound dehiscence and exposed hardware.     Now s/p PTA RLE PT/AT, completion angiogram showing 3 vessel run off, also washout and vac exchange    Plan  Plan for OR for flap coverage next Wednesday 9/25  - Ortho/Vasc/Endocrine/ID recs appreciated  - Continue to optimize nutrition   - Continue wound vac per Ortho and Abx per primary for now  - continue preoptimization    Kristian Silva MD  Plastic Surgery Fellow  09/20/2024

## 2024-09-20 NOTE — ASSESSMENT & PLAN NOTE
Titrate insulin slowly to avoid hypoglycemia as the risk of hypoglycemia increases with decreased creatinine clearance.  Estimated Creatinine Clearance: 72 mL/min (based on SCr of 1.1 mg/dL).

## 2024-09-20 NOTE — PROGRESS NOTES
[facility] Medicine  Progress Note    Patient Name: Cortes Scrhoeder  MRN: [id number]  Patient Class: IP- Inpatient   Admission Date: 9/6/2024  Length of Stay: 13 days  Attending Physician: Raisa Kearns MD  Primary Care Provider: Andrew Sinclair Jr., MD        Subjective:     Principal Problem:Surgical wound breakdown        HPI:  Cortes Schroeder is a 64 y.o. M with PMHx of anxiety, T2DM, GERD, HLD, HTN who presented to ED for AMS. He had a fall in July that resulted in R trimalleolar fracture and L distal radius fracture. He had external fixation on 07/18 and then ORIF on 07/26 with Dr. Liz. He was prescribed clinda 300 mg on 08/28 for a ten day course. Patient was doing well when he acutely became altered and not acting like himself a few hours ago. Patient family member drove him here from [facility] for ortho consult. R medial ankle wound dehisced with redness and swelling around it. No CPM fever, cough, N/V/D or seizure.    In ED: T max 99.1. SIRS 2/4 with WBC 18 and . CMP notable for Na 127, Cr 1.7, . Phos 1.9. .3. BNP 73. Trop neg. A1c 8.5. R tib fib XR notes There are 2 abandoned anterior-posteriorly oriented pin tracks in the right mid tibial shaft.  On AP views, each of these pin tracks demonstrates a small faint internal density, possibly representing a sequestrum. Ortho and endocrine consulted. Given IV vanc and IV clindamycin. Given 2.7L IVFs. Admitted to hospital medicine for sepsis 2/2 infected hardware.     Overview/Hospital Course:  Cortes Schroeder is a 63 y/o male admitted to hospital medicine for sepsis related to right ankle from surgical wound infection in patient with recent ORIF of right ankle fracture done on 7/26/2024. Patient started on empiric IV Vancomycin and IV Cefepime and given IVF's as per sepsis protocol. Orthopedics consulted and patient taken to OR on 9/6/2024 and had irrigation debridement of right surgical incision and  placement of wound vac.Endocrine consulted to assist in management of his diabetes while in hospital. Blood cultures from admit returned with MSSA. Infectious Disease consulted. Wound culture returned positive for Group B streptococcus and MSSA. Echo done due to bacteremia without concern for vegetations. ID changed antibiotics to IV Oxacillin. Patient with new cough and worsening WBC. CTA chest negative for PE but notes small area of ground glass changes to RUL. Patient placed on 5 day course of po Doxycycline to treat as per ID and completed for possible pneumonia. Patient taken back to OR on 09/09/2024 with Ortho and had another irrigation and debridement and replacement of wound vac to right ankle. Plastics consulted for flap evaluation and recommended vascular evaluation. CTA right lower extremity done and showed moderate atherosclerosis and multifocal high grade stenosis throughout right calf arteries. Dietary consulted for malnutrition and started on Boost supplementation to maximize his nutrition for a flap and wound healing. Vascular surgery consulted per Plastics recs as they would like to pre-optimize blood flow prior to any free flap or pedicled propellar flap. Vascular recommended angiogram of right leg but patient developed DEEPALI. Nephrology consulted and suspected ATN related to contrast nephropathy. Patient placed on supportive care. DEEPALI resolved. Patyoanetn taken for unilateral angiogram by Vascular on 9/17 and underwent PTA of right posterior tibial artery and right anterior tibial artery. After revascularization of right leg pl;an to do flap but working on timing with Ortho and Plastics. Patient to go back to OR again on 9/20 for another irrigation and debridement and replacement of wound vac to right ankle wound by Ortho. Plastics plans propeller flap to right ankle on 9/25 and then will need to remain in hospital for 1 week after flap for dangling protocol and monitoring of flap per Plastics. Repeat  blood cultures from 9/8 and 9/10 no growth.     Interval History: Patient to go back to OR again tomorrow for another irrigation and debridement and replacement of wound vac to right ankle wound by Ortho. Plastics plans propeller flap to right ankle on 9/25 and then will need to remain in hospital for 1 week after flap for dangling protocol and monitoring of flap per Plastics. Patient on IV Oxacillin to treat right ankle wound infection and MSSA bacteremia as per ID. DEEPALI resolved. Labs reviewed. Creatinine stable at 1.2 and was 1.2 yesterday. Hgb 7.7 and slightly down from 8.4 yesterday. Patient with no clinical signs of bleeding and will monitor. Sodium stable at 134 and was 134 yesterday. Blood sugars elevated at 256 -304 and Endocrine following and managing his diabetes and adjusting insulin. Patient reports pain in right ankle controlled and 3/10.     Review of Systems   Constitutional:  Negative for fever.   Respiratory:  Negative for cough and shortness of breath.    Cardiovascular:  Negative for chest pain.   Gastrointestinal:  Negative for nausea and vomiting.   Musculoskeletal:  Positive for arthralgias (Right ankle).   Psychiatric/Behavioral:  Negative for agitation and confusion.      Objective:     Vital Signs (Most Recent):  Temp: 98.6 °F (37 °C) (09/19/24 1615)  Pulse: 102 (09/19/24 1615)  Resp: 20 (09/19/24 1615)  BP: 166/65 (09/19/24 1615)  SpO2: 94 % (09/19/24 1615) on 2 liters of oxygen Vital Signs (24h Range):  Temp:  [98.3 °F (36.8 °C)-98.9 °F (37.2 °C)] 98.6 °F (37 °C)  Pulse:  [] 102  Resp:  [16-22] 20  SpO2:  [94 %-97 %] 94 %  BP: (160-172)/(65-81) 166/65     Weight: 93 kg (205 lb 0.4 oz)  Body mass index is 34.12 kg/m².    Intake/Output Summary (Last 24 hours) at 9/19/2024 1812  Last data filed at 9/19/2024 0339  Gross per 24 hour   Intake --   Output 450 ml   Net -450 ml         Physical Exam  Vitals and nursing note reviewed.   Constitutional:       General: He is awake. He is not in  acute distress.     Appearance: Normal appearance. He is well-developed. He is obese.      Interventions: Nasal cannula in place.      Comments: Patient sitting up in bed in no distress and in no apparent pain.    Eyes:      Conjunctiva/sclera: Conjunctivae normal.   Cardiovascular:      Rate and Rhythm: Normal rate and regular rhythm.      Heart sounds: Normal heart sounds. No murmur heard.  Pulmonary:      Effort: Pulmonary effort is normal. No respiratory distress.      Breath sounds: Normal breath sounds. No wheezing.   Abdominal:      General: Abdomen is flat. Bowel sounds are normal. There is no distension.      Palpations: Abdomen is soft.      Tenderness: There is no abdominal tenderness.   Musculoskeletal:      Left lower leg: No edema.      Comments: Wound vac in place to right ankle and ACE bandage overlying wound vac.    Skin:     General: Skin is warm.      Findings: No erythema.   Neurological:      Mental Status: He is alert and oriented to person, place, and time.   Psychiatric:         Mood and Affect: Mood normal.         Behavior: Behavior normal. Behavior is cooperative.         Thought Content: Thought content normal.         Judgment: Judgment normal.             Significant Labs: CBC:   Recent Labs   Lab 09/18/24  0320 09/19/24  0256   WBC 12.75* 12.21   HGB 8.4* 7.7*   HCT 25.3* 23.5*   * 663*     CMP:   Recent Labs   Lab 09/18/24  0320 09/19/24  0256   * 134*   K 3.9 4.5    104   CO2 20* 20*   * 264*   BUN 22 24*   CREATININE 1.2 1.2   CALCIUM 7.8* 8.1*   PROT 6.0 6.1   ALBUMIN 1.4* 1.5*   BILITOT 0.4 0.3   ALKPHOS 213* 195*   AST 20 25   ALT 5* 5*   ANIONGAP 9 10     Blood Sugars (AccuCheck):    Recent Labs     09/18/24  1150 09/18/24  1557 09/19/24  0009 09/19/24  0718 09/19/24  1116 09/19/24  1612   POCTGLUCOSE 282* 235* 256* 304* 281* 244*       Significant Imaging: I have reviewed all pertinent imaging results/findings within the past 24  hours.    Assessment/Plan:      * Surgical wound breakdown  Closed trimalleolar fracture of right ankle s/p ORIF in 7/2024  Surgical site infection  65 yo male presenting with confusion and worsening redness, swelling and pain to right ankle. Patient with sepsis criteria on admit and right ankle wound felt to be source of infection.     - Ortho consulted and patient taken to OR 09/06/2024 for debridement of surgical wound with wound vac placed. Cultures taken and grew both MSSA and Group B streptococcus from wound. Patient taken back again to OR with Ortho and patient had another I&D on 9/9/2024.  - Orthopedics recommended Plastics evaluation for free flap and as part of evaluation recommended Vascular evaluation. Vascular evaluated patient and angiogram of right leg done and PTA done and revascularization of right PT/AT. Plastics plan propeller flap on 9/25/2024. Per Plastics will need at least a week in hospital after flap for dangle protocols.  - Wound vac in place as per Ortho and back to OR again on 9/20 for I&D and wound vac change.  - Wound cultures -- MSSA and Group B strep from right ankle.   - Blood cultures -- MSSA on admit but repeat blood cultures negative.   - ID consulted and following and appreciate recs:  - Continue IV Oxacillin 12 g every 24 hours continuous infusion. Plan for orals if hardware retained once completed.  - Anticipate 6 weeks of IV antibiotics from date of most recent washout.    MSSA bacteremia  - Blood cultures from admit grew MSSA. Repeat blood cultures done on 9/8 and 9/10 no growth.  - Infectious Disease consulted and patient placed on IV Oxacillin infusion as suspected source was right ankle surgical wound infection for bacteremia as grew MSSA from right ankle wound.   - Echo done without concern for vegetations.    Acute renal failure with tubular necrosis  Resolved. DEEPALI is likely due to ATN from contrast in combination with sepsis. Baseline creatinine is  1.0-1.2 . Most  recent creatinine and eGFR are listed below.  Recent Labs     09/17/24  0533 09/18/24  0320 09/19/24  0256   CREATININE 1.3 1.2 1.2   EGFRNORACEVR >60.0 >60.0 >60.0        Plan  - Avoid nephrotoxins and renally dose meds for GFR listed above  - Monitor urine output, serial BMP, and adjust therapy as needed  - Patient with worsening creatinine after contrast studies and Cr peaked at 3.3 on 9/14.  Creatinine   Date Value Ref Range Status   09/19/2024 1.2 0.5 - 1.4 mg/dL Final   09/18/2024 1.2 0.5 - 1.4 mg/dL Final   09/17/2024 1.3 0.5 - 1.4 mg/dL Final   09/16/2024 1.8 (H) 0.5 - 1.4 mg/dL Final   09/15/2024 2.7 (H) 0.5 - 1.4 mg/dL Final     - Renal consulted. ATN noted on urine microscopy with casts, secondary to sepsis/contrast likely combination and recommended supportive care.   - Creatinine improved with supportive care and now back down to 1.2 on 9/19.   - Renally dose medications.   - Monitor daily BMP.     Oropharyngeal dysphagia  Patient with issues with coughing and swallowing so Speech therapy consulted for evaluation. Patient evaluated and monitored and improved. Initially placed on soft diet but on 9/18 upgraded to regular diet with thin liquids as per Speech. Dysphagia resolved.       Uncontrolled type 2 diabetes mellitus with hyperglycemia  Patient's FSGs are not controlled on current hypoglycemics. Endocrine consulted and managing patient's diabetes in hospital and appreciate assistance. Patient on insulin pump at [facility] but not in hospital and on basal/prandial insulin in hospital as per Endocrine.   Last A1c reviewed-   Lab Results   Component Value Date    LABA1C 10.8 (H) 08/15/2016    HGBA1C 8.5 (H) 09/06/2024     Most recent fingerstick glucose reviewed-   Recent Labs   Lab 09/19/24  0009 09/19/24  0718 09/19/24  1116 09/19/24  1612   POCTGLUCOSE 256* 304* 281* 244*       Current correctional scale  Low  Maintain anti-hyperglycemic dose as follows-   Antihyperglycemics (From admission, onward)       Start     Stop Route Frequency Ordered    09/19/24 0900  insulin glargine U-100 (Lantus) pen 28 Units         -- SubQ Daily 09/19/24 0810    09/17/24 1130  insulin aspart U-100 pen 4-8 Units         -- SubQ 3 times daily with meals 09/17/24 0815    09/13/24 0830  insulin aspart U-100 pen 0-10 Units         -- SubQ Every 4 hours PRN 09/13/24 0731           - Endocrine consulted:  - At this time, recommend that the patient remain off of his pump since he cannot be controlled in the Auto mode. Patient does not interact with pump frequently and relies on the auto mode algorithm.   - Insulin dosing as per Endocrine recommendations.   - Hold oral antihyperglycemics during hospitalization   - Monitor blood sugars with meals and at bedtime in hospital.  - Target blood sugars 140-180 in hospital.  - Diabetic diet.     Metabolic acidosis  Related to DEEPALI. Patient started on sodium bicarbonate tablets and will continue. Monitor daily HCO3 levels with labs.       Acute hypoxemic respiratory failure  Improving. Patient with Hypoxic Respiratory failure which is Acute.  he is not on home oxygen. Supplemental oxygen was provided and noted- 2 liters of oxygen.     Contributing diagnoses includes - Obesity Hypoventilation and Pneumonia - that was treated with 5 days of oral Doxycyline ended on 9/14. Labs and images were reviewed. Patient Has not had a recent ABG. Will treat underlying causes and adjust management of respiratory failure as follows-     - CT chest showing tracheomalachia/hyperinflation lungs. Has no history of excessive intubation or lung disease known to him. Using IS on exam and speaking in full sentences on exam, with small amount of clear/white sputum. No obvious signs of volume overload.  - CTA ordered and done and negative for PE, but small ground glass area to RUL representing infectious vs. Inflammatory process. Treated with 5 days of oral Doxycyline for suspected pneumonia.   - Mucinex BID, Acapella, and  nebulized saline. Encourage IS use.  - prn duo nebs  prn Duoneb treatments for wheezing or SOB.   - Wean oxygen as tolerated to keep oxygen sats > 90%.     Hyponatremia  Hyponatremia is likely due to Dehydration/hypovolemia. The patient's most recent sodium results are listed below.  Recent Labs     09/17/24  0533 09/18/24  0320 09/19/24  0256   * 134* 134*       Plan  - Correct the sodium by 4-6mEq in 24 hours.   - Encourage oral intake.   - Monitor sodium Daily. Patient asymptomatic.   - Patient hyponatremia is stable.     Gastroesophageal reflux disease without esophagitis  Controlled. Continue Carafate every 6 hours po to treat.       Airway malacia  Note don CT scan of chest. Patient with no acute issues and incidentally note don CT scan of chest. Patient asymptomatic.       Acute blood loss anemia  Anemia is likely due to acute blood loss which was from surgery. Most recent hemoglobin and hematocrit are listed below.  Recent Labs     09/17/24  0533 09/18/24  0320 09/19/24  0256   HGB 8.5* 8.4* 7.7*   HCT 26.1* 25.3* 23.5*       Plan  - Monitor serial CBC: Daily  - Transfuse PRBC if patient becomes hemodynamically unstable, symptomatic or H/H drops below 7/21.  - Patient has not received any PRBC transfusions to date  - Patient's anemia is currently worsening. Will monitor for now. No clinical signs of bleeding.  No indication for blood transfusion.       PAD (peripheral artery disease)  Present on admit. Patient noted to have high grade stenosis on CTA of right leg. Vascular surgery consulted and patient taken to OR and had unilateral angiogram of right leg and had PT/AT artery stenosis on 9/17 and underwent PTA of right PT/AT arteries. Appreciate Vascular surgery assistance on case.       Transaminitis  Truvada on hold as concern possible cause of elevated AST and ALT note don labs. AST and ALT back to normal off Truvada and continue to hold.     Thrombocytosis  Present on admit and related to infection  causing increase in platelet count. Monitor daily CBC.       On pre-exposure prophylaxis for HIV  Patient on Truvada as outpatient but held in hospital due to elevated AST and ALT that has resolved.     Class 1 obesity due to excess calories with serious comorbidity and body mass index (BMI) of 34.0 to 34.9 in adult  Body mass index is 34.12 kg/m². Morbid obesity complicates all aspects of disease management from diagnostic modalities to treatment. Weight loss encouraged and health benefits explained to patient.       VTE Risk Mitigation (From admission, onward)           Ordered     enoxaparin injection 40 mg  Daily         09/18/24 0808     IP VTE HIGH RISK PATIENT  Once         09/06/24 1737                    Discharge Planning   JAYLEEN: 10/2/2024     Code Status: Full Code   Is the patient medically ready for discharge?:     Reason for patient still in hospital (select all that apply): Patient trending condition  Discharge Plan A: [facility]         Raisa Kearns MD  Department of Hospital Medicine   [facility] - Surgery

## 2024-09-20 NOTE — NURSING TRANSFER
Nursing Transfer Note      9/20/2024   2:16 PM    Pt transported to 542 on bed c iv pump, iv antibiotic infusing, wound vac.  VSS, denies pain, n/v, sob.  Report given to receiving floor rn by FRANCHESKA Mobley.  Family updated.

## 2024-09-20 NOTE — SUBJECTIVE & OBJECTIVE
"Interval HPI:   Overnight events: No acute events overnight. Patient in room [facility] 2ND FLR Periop Pool*. Blood glucose stable. BG at goal on current insulin regimen (SSI, prandial, and basal insulin ). Steroid use- None. Day of Surgery  Renal function- Normal   Vasopressors-  None       Endocrine will continue to follow and manage insulin orders inpatient.         Diet NPO Except for: Sips with Medication     Eating:   NPO  Nausea: No  Hypoglycemia and intervention: No  Fever: No  TPN and/or TF: No      BP (!) 162/81 (BP Location: Right arm, Patient Position: Lying)   Pulse 106   Temp 97.3 °F (36.3 °C) (Temporal)   Resp (!) 24   Ht 5' 5" (1.651 m)   Wt 93 kg (205 lb 0.4 oz)   SpO2 98%   BMI 34.12 kg/m²     Labs Reviewed and Include    Recent Labs   Lab 09/20/24  0434   *   CALCIUM 8.3*   ALBUMIN 1.7*   PROT 6.2      K 4.0   CO2 23      BUN 25*   CREATININE 1.1   ALKPHOS 199*   ALT 5*   AST 21   BILITOT 0.4     Lab Results   Component Value Date    WBC 11.16 09/20/2024    HGB 7.8 (L) 09/20/2024    HCT 24.2 (L) 09/20/2024    MCV 88 09/20/2024     (H) 09/20/2024     No results for input(s): "TSH", "FREET4" in the last 168 hours.  Lab Results   Component Value Date    HGBA1C 8.5 (H) 09/06/2024       Nutritional status:   Body mass index is 34.12 kg/m².  Lab Results   Component Value Date    ALBUMIN 1.7 (L) 09/20/2024    ALBUMIN 1.5 (L) 09/19/2024    ALBUMIN 1.4 (L) 09/18/2024     Lab Results   Component Value Date    PREALBUMIN 3 (L) 09/06/2024    PREALBUMIN 13 (L) 07/18/2024       Estimated Creatinine Clearance: 72 mL/min (based on SCr of 1.1 mg/dL).    Accu-Checks  Recent Labs     09/17/24  2215 09/18/24  0743 09/18/24  1150 09/18/24  1557 09/19/24  0009 09/19/24  0718 09/19/24  1116 09/19/24  1612 09/19/24  2354 09/20/24  0750   POCTGLUCOSE 227* 210* 282* 235* 256* 304* 281* 244* 172* 232*       Current Medications and/or Treatments Impacting Glycemic Control  Immunotherapy:  "   Immunosuppressants       None          Steroids:   Hormones (From admission, onward)      None          Pressors:    Autonomic Drugs (From admission, onward)      None          Hyperglycemia/Diabetes Medications:   Antihyperglycemics (From admission, onward)      Start     Stop Route Frequency Ordered    09/21/24 0900  insulin glargine U-100 (Lantus) pen 34 Units         -- SubQ Daily 09/20/24 0844    09/20/24 1130  insulin aspart U-100 pen 2-8 Units         -- SubQ 3 times daily with meals 09/20/24 0844    09/13/24 0830  insulin aspart U-100 pen 0-10 Units         -- SubQ Every 4 hours PRN 09/13/24 0731

## 2024-09-20 NOTE — ANESTHESIA PROCEDURE NOTES
Right Saphenous SS    Patient location during procedure: pre-op   Block not for primary anesthetic.  Reason for block: at surgeon's request and post-op pain management   Post-op Pain Location: Right leg pain   Start time: 9/20/2024 9:56 AM  Timeout: 9/20/2024 9:55 AM   End time: 9/20/2024 10:01 AM    Staffing  Authorizing Provider: Maynor Ragland MD  Performing Provider: Bernarda Caro MD    Staffing  Performed by: Bernarda Caro MD  Authorized by: Maynor Ragland MD    Preanesthetic Checklist  Completed: patient identified, IV checked, site marked, risks and benefits discussed, surgical consent, monitors and equipment checked, pre-op evaluation and timeout performed  Peripheral Block  Patient position: supine  Prep: ChloraPrep  Patient monitoring: heart rate, cardiac monitor, continuous pulse ox, continuous capnometry and frequent blood pressure checks  Block type: saphenous  Laterality: right  Injection technique: single shot  Needle  Needle type: Echogenic   Needle gauge: 20 G  Needle length: 4 in  Needle localization: anatomical landmarks and ultrasound guidance   -ultrasound image captured on disc.  Assessment  Injection assessment: negative aspiration, negative parasthesia and local visualized surrounding nerve  Paresthesia pain: none  Heart rate change: no  Slow fractionated injection: yes  Pain Tolerance: comfortable throughout block  Medications:    Medications: bupivacaine (pf) (MARCAINE) injection 0.5% - Perineural, Other   20 mL - 9/20/2024 10:08:00 AM    Additional Notes  VSS.  [facility] RN monitoring vitals throughout procedure.  Patient tolerated procedure well.

## 2024-09-20 NOTE — ASSESSMENT & PLAN NOTE
Resolved. DEEPALI is likely due to ATN from contrast in combination with sepsis. Baseline creatinine is  1.0-1.2 . Most recent creatinine and eGFR are listed below.  Recent Labs     09/18/24  0320 09/19/24  0256 09/20/24  0434   CREATININE 1.2 1.2 1.1   EGFRNORACEVR >60.0 >60.0 >60.0      Plan  - Avoid nephrotoxins and renally dose meds for GFR listed above  - Monitor urine output, serial BMP, and adjust therapy as needed  - Patient with worsening creatinine after contrast studies and Cr peaked at 3.3 on 9/14.  Creatinine   Date Value Ref Range Status   09/20/2024 1.1 0.5 - 1.4 mg/dL Final   09/19/2024 1.2 0.5 - 1.4 mg/dL Final   09/18/2024 1.2 0.5 - 1.4 mg/dL Final   09/17/2024 1.3 0.5 - 1.4 mg/dL Final   09/16/2024 1.8 (H) 0.5 - 1.4 mg/dL Final     - Renal consulted. ATN noted on urine microscopy with casts, secondary to sepsis/contrast likely combination and recommended supportive care.   - Creatinine improved with supportive care and now back down to 1.2 on 9/19.   - Renally dose medications.   - Monitor daily BMP.

## 2024-09-20 NOTE — NURSING
Nurses Note -- 4 Eyes      9/20/2024   6:11 AM      Skin assessed during: Q Shift Change      [] No Altered Skin Integrity Present    []Prevention Measures Documented      [] Yes- Altered Skin Integrity Present or Discovered   [] LDA Added if Not in Epic (Describe Wound)   [] New Altered Skin Integrity was Present on Admit and Documented in LDA   [] Wound Image Taken    Wound Care Consulted? No    Attending Nurse:  Richelle Hoffmann RN/Staff Member:   Rigoberto

## 2024-09-20 NOTE — ASSESSMENT & PLAN NOTE
Endocrinology consulted for BG management.   BG goal 140-180    - Lantus (Insulin Glargine 28 units daily  - Novolog 2-8 with meals (Administer   2   units if patient eats 25% of meal, administer 4 units if the patient eats 50% of meal, administer 6 units if patient eats 75% of meal, and administer 8 units if the patient eats 100% of meal.)   - [facility] SSI (150/25)  - BG checks q4hr  - Hypoglycemia protocol in place    ** Please notify Endocrine for any change and/or advance in diet**  ** Please call Endocrine for any BG related issues **    Discharge Planning:   TBD. Please notify endocrinology prior to discharge.

## 2024-09-20 NOTE — ASSESSMENT & PLAN NOTE
Body mass index is 34.12 kg/m². Morbid obesity complicates all aspects of disease management from diagnostic modalities to treatment. Weight loss encouraged and health benefits explained to patient.

## 2024-09-20 NOTE — ASSESSMENT & PLAN NOTE
Related to DEEPALI. Patient started on sodium bicarbonate tablets and will continue. Monitor daily HCO3 levels with labs.

## 2024-09-20 NOTE — PT/OT/SLP PROGRESS
Occupational Therapy      Patient Name:  Cortes Schroeder   MRN:  [id number]    Patient not seen today secondary to Off the floor for procedure/surgery in AM and PM. Will follow-up .    9/20/2024

## 2024-09-20 NOTE — ASSESSMENT & PLAN NOTE
Present on admit. Patient noted to have high grade stenosis on CTA of right leg. Vascular surgery consulted and patient taken to OR and had unilateral angiogram of right leg and had PT/AT artery stenosis on 9/17 and underwent PTA of right PT/AT arteries. Appreciate Vascular surgery assistance on case.

## 2024-09-20 NOTE — ANESTHESIA POSTPROCEDURE EVALUATION
Anesthesia Post Evaluation    Patient: Cortes Schroeder    Procedure(s) Performed: Procedure(s) (LRB):  REPLACEMENT, WOUND VAC (Right)  IRRIGATION AND DEBRIDEMENT (Right)    Final Anesthesia Type: general      Patient location during evaluation: PACU  Patient participation: Yes- Able to Participate  Level of consciousness: awake  Post-procedure vital signs: reviewed and stable  Pain management: adequate  Airway patency: patent    PONV status at discharge: No PONV  Anesthetic complications: no      Cardiovascular status: blood pressure returned to baseline  Respiratory status: unassisted  Hydration status: euvolemic  Follow-up not needed.              Vitals Value Taken Time   /79 09/20/24 1402   Temp 36.7 °C (98 °F) 09/20/24 1230   Pulse 96 09/20/24 1413   Resp 26 09/20/24 1413   SpO2 94 % 09/20/24 1413   Vitals shown include unfiled device data.      Event Time   Out of Recovery 09/20/2024 13:00:00         Pain/Judy Score: Pain Rating Prior to Med Admin: 8 (9/20/2024  8:37 AM)  Pain Rating Post Med Admin: 0 (9/20/2024 12:30 AM)  Judy Score: 10 (9/20/2024  1:15 PM)

## 2024-09-20 NOTE — SUBJECTIVE & OBJECTIVE
Interval History: Patient taken back to OR this am with Dr. Moe Liz and had another I&D of right ankle and wound and replacement of wound vac. Patient back from surgery and in his room and awake and alert. Patient reports 2/10 pain to right ankle currently. Patient to remain non weight bearing post-op and Plastics plans propellor flap placement while in hospital on 9/25. Patient updated on plan of care and aware of plan. Labs reviewed. Hgb stable at 7.8 and was 7.7 yesterday. Sodium improved to 140 today from 134 yesterday. Creatinine stable at 1.1. Blood sugars 177-223.     Review of Systems   Constitutional:  Negative for fever.   Respiratory:  Negative for cough and shortness of breath.    Cardiovascular:  Negative for chest pain.   Gastrointestinal:  Negative for nausea and vomiting.   Musculoskeletal:  Positive for arthralgias (Right ankle).   Psychiatric/Behavioral:  Negative for agitation and confusion.      Objective:     Vital Signs (Most Recent):  Temp: 98.6 °F (37 °C) (09/20/24 1607)  Pulse: 99 (09/20/24 1607)  Resp: 20 (09/20/24 1607)  BP: 164/76 (09/20/24 1607)  SpO2: 92 % (09/20/24 1607) on room air Vital Signs (24h Range):  Temp:  [97.3 °F (36.3 °C)-98.6 °F (37 °C)] 98.6 °F (37 °C)  Pulse:  [] 99  Resp:  [17-26] 20  SpO2:  [92 %-100 %] 92 %  BP: (141-206)/(69-93) 164/76     Weight: 93 kg (205 lb 0.4 oz)  Body mass index is 34.12 kg/m².    Intake/Output Summary (Last 24 hours) at 9/20/2024 1646  Last data filed at 9/20/2024 1222  Gross per 24 hour   Intake 500 ml   Output --   Net 500 ml         Physical Exam  Vitals and nursing note reviewed.   Constitutional:       General: He is awake. He is not in acute distress.     Appearance: Normal appearance. He is well-developed. He is obese.      Interventions: Nasal cannula in place.      Comments: Patient sitting up in bed in no distress and in no apparent pain.    Eyes:      Conjunctiva/sclera: Conjunctivae normal.   Cardiovascular:      Rate  and Rhythm: Normal rate and regular rhythm.      Heart sounds: Normal heart sounds. No murmur heard.  Pulmonary:      Effort: Pulmonary effort is normal. No respiratory distress.      Breath sounds: Normal breath sounds. No wheezing.   Abdominal:      General: Abdomen is flat. Bowel sounds are normal. There is no distension.      Palpations: Abdomen is soft.      Tenderness: There is no abdominal tenderness.   Musculoskeletal:      Left lower leg: No edema.      Comments: Wound vac in place to right ankle and ACE bandage overlying wound vac.    Skin:     General: Skin is warm.      Findings: No erythema.   Neurological:      Mental Status: He is alert and oriented to person, place, and time.   Psychiatric:         Mood and Affect: Mood normal.         Behavior: Behavior normal. Behavior is cooperative.         Thought Content: Thought content normal.         Judgment: Judgment normal.             Significant Labs: CBC:   Recent Labs   Lab 09/19/24  0256 09/20/24  0434   WBC 12.21 11.16   HGB 7.7* 7.8*   HCT 23.5* 24.2*   * 628*     CMP:   Recent Labs   Lab 09/19/24  0256 09/20/24  0434   * 140   K 4.5 4.0    106   CO2 20* 23   * 171*   BUN 24* 25*   CREATININE 1.2 1.1   CALCIUM 8.1* 8.3*   PROT 6.1 6.2   ALBUMIN 1.5* 1.7*   BILITOT 0.3 0.4   ALKPHOS 195* 199*   AST 25 21   ALT 5* 5*   ANIONGAP 10 11     POCT Glucose:   Recent Labs   Lab 09/20/24  0942 09/20/24  1408 09/20/24  1614   POCTGLUCOSE 223* 193* 177*       Significant Imaging: I have reviewed all pertinent imaging results/findings within the past 24 hours.

## 2024-09-20 NOTE — ASSESSMENT & PLAN NOTE
Note don CT scan of chest. Patient with no acute issues and incidentally note don CT scan of chest. Patient asymptomatic.

## 2024-09-20 NOTE — TRANSFER OF CARE
"Anesthesia Transfer of Care Note    Patient: Cortes Schroeder    Procedure(s) Performed: Procedure(s) (LRB):  REPLACEMENT, WOUND VAC (Right)  IRRIGATION AND DEBRIDEMENT (Right)    Patient location: PACU    Anesthesia Type: regional    Transport from OR: Transported from OR on 6-10 L/min O2 by face mask with adequate spontaneous ventilation    Post pain: adequate analgesia    Post assessment: no apparent anesthetic complications and tolerated procedure well    Post vital signs: stable    Level of consciousness: sedated    Nausea/Vomiting: no nausea/vomiting    Complications: none    Transfer of care protocol was followed      Last vitals: Visit Vitals  BP (!) 141/69   Pulse 83   Temp 36.7 °C (98 °F) (Temporal)   Resp (!) 23   Ht 5' 5" (1.651 m)   Wt 93 kg (205 lb 0.4 oz)   SpO2 100%   BMI 34.12 kg/m²     "

## 2024-09-20 NOTE — ASSESSMENT & PLAN NOTE
Truvada on hold as concern possible cause of elevated AST and ALT note don labs. AST and ALT back to normal off Truvada and continue to hold.

## 2024-09-20 NOTE — ASSESSMENT & PLAN NOTE
Present on admit and related to infection causing increase in platelet count. Monitor daily CBC.

## 2024-09-20 NOTE — ASSESSMENT & PLAN NOTE
Patient on Truvada as outpatient but held in hospital due to elevated AST and ALT that has resolved.

## 2024-09-20 NOTE — ASSESSMENT & PLAN NOTE
Anemia is likely due to acute blood loss which was from surgery. Most recent hemoglobin and hematocrit are listed below.  Recent Labs     09/17/24  0533 09/18/24  0320 09/19/24  0256   HGB 8.5* 8.4* 7.7*   HCT 26.1* 25.3* 23.5*       Plan  - Monitor serial CBC: Daily  - Transfuse PRBC if patient becomes hemodynamically unstable, symptomatic or H/H drops below 7/21.  - Patient has not received any PRBC transfusions to date  - Patient's anemia is currently worsening. Will monitor for now. No clinical signs of bleeding.  No indication for blood transfusion.

## 2024-09-20 NOTE — ASSESSMENT & PLAN NOTE
Resolved. Patient on room air on 9/20.   Improving. Patient with Hypoxic Respiratory failure which is Acute.  he is not on home oxygen. Supplemental oxygen was provided and noted- 2 liters of oxygen.     Contributing diagnoses includes - Obesity Hypoventilation and Pneumonia - that was treated with 5 days of oral Doxycyline ended on 9/14. Labs and images were reviewed. Patient Has not had a recent ABG. Will treat underlying causes and adjust management of respiratory failure as follows-     - CT chest showing tracheomalachia/hyperinflation lungs. Has no history of excessive intubation or lung disease known to him. Using IS on exam and speaking in full sentences on exam, with small amount of clear/white sputum. No obvious signs of volume overload.  - CTA ordered and done and negative for PE, but small ground glass area to RUL representing infectious vs. Inflammatory process. Treated with 5 days of oral Doxycyline for suspected pneumonia.   - Mucinex BID, Acapella, and nebulized saline. Encourage IS use.  - prn duo nebs  prn Duoneb treatments for wheezing or SOB.   - Wean oxygen as tolerated to keep oxygen sats > 90%.

## 2024-09-20 NOTE — ASSESSMENT & PLAN NOTE
Improved. HCO3 up to 23 on 9/20 from 20 on 9/19. Related to DEEPALI. Patient started on sodium bicarbonate tablets and will continue. Monitor daily HCO3 levels with labs.

## 2024-09-20 NOTE — ASSESSMENT & PLAN NOTE
Improving. Patient with Hypoxic Respiratory failure which is Acute.  he is not on home oxygen. Supplemental oxygen was provided and noted- 2 liters of oxygen.     Contributing diagnoses includes - Obesity Hypoventilation and Pneumonia - that was treated with 5 days of oral Doxycyline ended on 9/14. Labs and images were reviewed. Patient Has not had a recent ABG. Will treat underlying causes and adjust management of respiratory failure as follows-     - CT chest showing tracheomalachia/hyperinflation lungs. Has no history of excessive intubation or lung disease known to him. Using IS on exam and speaking in full sentences on exam, with small amount of clear/white sputum. No obvious signs of volume overload.  - CTA ordered and done and negative for PE, but small ground glass area to RUL representing infectious vs. Inflammatory process. Treated with 5 days of oral Doxycyline for suspected pneumonia.   - Mucinex BID, Acapella, and nebulized saline. Encourage IS use.  - prn duo nebs  prn Duoneb treatments for wheezing or SOB.   - Wean oxygen as tolerated to keep oxygen sats > 90%.

## 2024-09-20 NOTE — PROGRESS NOTES
[facility] ([facility])  Endocrinology  Progress Note    Admit Date: 9/6/2024     Reason for Consult: Management of T2DM, Hyperglycemia      Surgical Procedure and Date: S/P irrigation debridement of RLE on 09/06/2024    Diabetes diagnosis year: 1995     Home Diabetes Medications:    Humalog via OmniPod insulin pump  Mounjaro 10 mg weekly     Current pump settings:  Basal 12 am to 2.3 and 6 am to 1.55  ICR to 3 at 12 am, 2 at 4 pm  ISF to 1:20   AIT 3 hrs  Target 110-120        Patient had anaphylaxis reaction to SGLT2 inhibitors specifically Invokana     How often checking glucose at home? Dexcom G6   BG readings on regimen: Average 210's per Dexcom  Hypoglycemia on the regimen?  Yes  Missed doses on regimen?  No     Diabetes Complications include:     Hyperglycemia and Diabetic peripheral neuropathy         Complicating diabetes co morbidities:   HLD, HTN, Obesity         HPI: 64 y.o. male presents to the ED w/ complaint of altered mental status. Hx of R ankle fracture with surgery over a month ago. Patient now presents with sepsis 2/2 R ankle infection s/p ORIF on 07/26. Started on IV vanc and cefepime. Given IVFs. Blood cultures pending. Brought to OR with ortho on 09/06 for irrigation debridement of RLE. Endocrine following for BG and type 2 diabetes.     Lab Results   Component Value Date    LABA1C 10.8 (H) 08/15/2016    HGBA1C 8.5 (H) 09/06/2024         Interval HPI:   Overnight events: No acute events overnight. Patient in room [address] Peri Pool*. Blood glucose stable. BG at goal on current insulin regimen (SSI, prandial, and basal insulin ). Steroid use- None. Day of Surgery  Renal function- Normal   Vasopressors-  None       Endocrine will continue to follow and manage insulin orders inpatient.         Diet NPO Except for: Sips with Medication     Eating:   NPO  Nausea: No  Hypoglycemia and intervention: No  Fever: No  TPN and/or TF: No      BP (!) 162/81 (BP Location: Right arm, Patient  "Position: Lying)   Pulse 106   Temp 97.3 °F (36.3 °C) (Temporal)   Resp (!) 24   Ht 5' 5" (1.651 m)   Wt 93 kg (205 lb 0.4 oz)   SpO2 98%   BMI 34.12 kg/m²     Labs Reviewed and Include    Recent Labs   Lab 09/20/24  0434   *   CALCIUM 8.3*   ALBUMIN 1.7*   PROT 6.2      K 4.0   CO2 23      BUN 25*   CREATININE 1.1   ALKPHOS 199*   ALT 5*   AST 21   BILITOT 0.4     Lab Results   Component Value Date    WBC 11.16 09/20/2024    HGB 7.8 (L) 09/20/2024    HCT 24.2 (L) 09/20/2024    MCV 88 09/20/2024     (H) 09/20/2024     No results for input(s): "TSH", "FREET4" in the last 168 hours.  Lab Results   Component Value Date    HGBA1C 8.5 (H) 09/06/2024       Nutritional status:   Body mass index is 34.12 kg/m².  Lab Results   Component Value Date    ALBUMIN 1.7 (L) 09/20/2024    ALBUMIN 1.5 (L) 09/19/2024    ALBUMIN 1.4 (L) 09/18/2024     Lab Results   Component Value Date    PREALBUMIN 3 (L) 09/06/2024    PREALBUMIN 13 (L) 07/18/2024       Estimated Creatinine Clearance: 72 mL/min (based on SCr of 1.1 mg/dL).    Accu-Checks  Recent Labs     09/17/24  2215 09/18/24  0743 09/18/24  1150 09/18/24  1557 09/19/24  0009 09/19/24  0718 09/19/24  1116 09/19/24  1612 09/19/24  2354 09/20/24  0750   POCTGLUCOSE 227* 210* 282* 235* 256* 304* 281* 244* 172* 232*       Current Medications and/or Treatments Impacting Glycemic Control  Immunotherapy:    Immunosuppressants       None          Steroids:   Hormones (From admission, onward)      None          Pressors:    Autonomic Drugs (From admission, onward)      None          Hyperglycemia/Diabetes Medications:   Antihyperglycemics (From admission, onward)      Start     Stop Route Frequency Ordered    09/21/24 0900  insulin glargine U-100 (Lantus) pen 34 Units         -- SubQ Daily 09/20/24 0844    09/20/24 1130  insulin aspart U-100 pen 2-8 Units         -- SubQ 3 times daily with meals 09/20/24 0844    09/13/24 0830  insulin aspart U-100 pen 0-10 " Units         -- SubQ Every 4 hours PRN 09/13/24 0731            ASSESSMENT and PLAN    Renal/  Acute renal failure with tubular necrosis  Titrate insulin slowly to avoid hypoglycemia as the risk of hypoglycemia increases with decreased creatinine clearance.  Estimated Creatinine Clearance: 72 mL/min (based on SCr of 1.1 mg/dL).        Endocrine  Uncontrolled type 2 diabetes mellitus with hyperglycemia  Endocrinology consulted for BG management.   BG goal 140-180    - Lantus (Insulin Glargine 28 units daily  - Novolog 2-8 with meals (Administer   2   units if patient eats 25% of meal, administer 4 units if the patient eats 50% of meal, administer 6 units if patient eats 75% of meal, and administer 8 units if the patient eats 100% of meal.)   - [facility] [place]150/25)  - BG checks q4hr  - Hypoglycemia protocol in place    ** Please notify Endocrine for any change and/or advance in diet**  ** Please call Endocrine for any BG related issues **    Discharge Planning:   TBD. Please notify endocrinology prior to discharge.        Orthopedic  * Surgical wound breakdown  Optimize BG control to improve wound healing  Managed per primary team               Brandyn Malin, DNP, FNP  Endocrinology  [facility] - [facility] (2nd Fl)

## 2024-09-20 NOTE — PT/OT/SLP PROGRESS
Physical Therapy      Patient Name:  Cortes Schroeder   MRN:  [id number]    Patient not seen today secondary to Off the floor for procedure/surgery. Will follow-up when appropriate.  9/20/2024  .

## 2024-09-20 NOTE — ASSESSMENT & PLAN NOTE
Resolved. Sodium back to normal at 140 on 9/20.   Hyponatremia is likely due to Dehydration/hypovolemia. The patient's most recent sodium results are listed below.  Recent Labs     09/18/24  0320 09/19/24  0256 09/20/24  0434   * 134* 140     Plan  - Correct the sodium by 4-6mEq in 24 hours.   - Encourage oral intake.   - Monitor sodium Daily. Patient asymptomatic.   - Patient hyponatremia is stable.

## 2024-09-20 NOTE — PROGRESS NOTES
[facility] Medicine  Progress Note    Patient Name: Cortes Schroeder  MRN: [id number]  Patient Class: IP- Inpatient   Admission Date: 9/6/2024  Length of Stay: 14 days  Attending Physician: Raisa Kearns MD  Primary Care Provider: Andrew Sinclair Jr., MD        Subjective:     Principal Problem:Surgical wound breakdown        HPI:  Cortes Schroeder is a 64 y.o. M with PMHx of anxiety, T2DM, GERD, HLD, HTN who presented to ED for AMS. He had a fall in July that resulted in R trimalleolar fracture and L distal radius fracture. He had external fixation on 07/18 and then ORIF on 07/26 with Dr. Liz. He was prescribed clinda 300 mg on 08/28 for a ten day course. Patient was doing well when he acutely became altered and not acting like himself a few hours ago. Patient family member drove him here from [facility] for ortho consult. R medial ankle wound dehisced with redness and swelling around it. No CPM fever, cough, N/V/D or seizure.    In ED: T max 99.1. SIRS 2/4 with WBC 18 and . CMP notable for Na 127, Cr 1.7, . Phos 1.9. .3. BNP 73. Trop neg. A1c 8.5. R tib fib XR notes There are 2 abandoned anterior-posteriorly oriented pin tracks in the right mid tibial shaft.  On AP views, each of these pin tracks demonstrates a small faint internal density, possibly representing a sequestrum. Ortho and endocrine consulted. Given IV vanc and IV clindamycin. Given 2.7L IVFs. Admitted to hospital medicine for sepsis 2/2 infected hardware.       Overview/Hospital Course:  Cortes Schroeder is a 65 y/o male admitted to hospital medicine for sepsis related to right ankle from surgical wound infection in patient with recent ORIF of right ankle fracture done on 7/26/2024. Patient started on empiric IV Vancomycin and IV Cefepime and given IVF's as per sepsis protocol. Orthopedics consulted and patient taken to OR on 9/6/2024 and had irrigation debridement of right surgical incision and  placement of wound vac.Endocrine consulted to assist in management of his diabetes while in hospital. Blood cultures from admit returned with MSSA. Infectious Disease consulted. Wound culture returned positive for Group B streptococcus and MSSA. Echo done due to bacteremia without concern for vegetations. ID changed antibiotics to IV Oxacillin. Patient with new cough and worsening WBC. CTA chest negative for PE but notes small area of ground glass changes to RUL. Patient placed on 5 day course of po Doxycycline to treat as per ID and completed for possible pneumonia. Patient taken back to OR on 09/09/2024 with Ortho and had another irrigation and debridement and replacement of wound vac to right ankle. Plastics consulted for flap evaluation and recommended vascular evaluation. CTA right lower extremity done and showed moderate atherosclerosis and multifocal high grade stenosis throughout right calf arteries. Dietary consulted for malnutrition and started on Boost supplementation to maximize his nutrition for a flap and wound healing. Vascular surgery consulted per Plastics recs as they would like to pre-optimize blood flow prior to any free flap or pedicled propellar flap. Vascular recommended angiogram of right leg but patient developed DEEPALI. Nephrology consulted and suspected ATN related to contrast nephropathy. Patient placed on supportive care. DEEPALI resolved. Patyoanetn taken for unilateral angiogram by Vascular on 9/17 and underwent PTA of right posterior tibial artery and right anterior tibial artery. After revascularization of right leg pl;an to do flap but working on timing with Ortho and Plastics. Patient to go back to OR again on 9/20 for another irrigation and debridement and replacement of wound vac to right ankle wound by Ortho. Plastics plans propeller flap to right ankle on 9/25 and then will need to remain in hospital for 1 week after flap for dangling protocol and monitoring of flap per Plastics. Repeat  blood cultures from 9/8 and 9/10 no growth. Patient taken back to OR on 9/20 with Dr. Moe Liz and had another I&D of right ankle and wound and replacement of wound vac. Patient to remain non weight bearing post-op and Plastics plans flap placement while in hospital on 9/25.       Interval History: Patient taken back to OR this am with Dr. Moe Liz and had another I&D of right ankle and wound and replacement of wound vac. Patient back from surgery and in his room and awake and alert. Patient reports 2/10 pain to right ankle currently. Patient to remain non weight bearing post-op and Plastics plans propellor flap placement while in hospital on 9/25. Patient updated on plan of care and aware of plan. Labs reviewed. Hgb stable at 7.8 and was 7.7 yesterday. Sodium improved to 140 today from 134 yesterday. Creatinine stable at 1.1. Blood sugars 177-223.     Review of Systems   Constitutional:  Negative for fever.   Respiratory:  Negative for cough and shortness of breath.    Cardiovascular:  Negative for chest pain.   Gastrointestinal:  Negative for nausea and vomiting.   Musculoskeletal:  Positive for arthralgias (Right ankle).   Psychiatric/Behavioral:  Negative for agitation and confusion.      Objective:     Vital Signs (Most Recent):  Temp: 98.6 °F (37 °C) (09/20/24 1607)  Pulse: 99 (09/20/24 1607)  Resp: 20 (09/20/24 1607)  BP: 164/76 (09/20/24 1607)  SpO2: 92 % (09/20/24 1607) on room air Vital Signs (24h Range):  Temp:  [97.3 °F (36.3 °C)-98.6 °F (37 °C)] 98.6 °F (37 °C)  Pulse:  [] 99  Resp:  [17-26] 20  SpO2:  [92 %-100 %] 92 %  BP: (141-206)/(69-93) 164/76     Weight: 93 kg (205 lb 0.4 oz)  Body mass index is 34.12 kg/m².    Intake/Output Summary (Last 24 hours) at 9/20/2024 1646  Last data filed at 9/20/2024 1222  Gross per 24 hour   Intake 500 ml   Output --   Net 500 ml         Physical Exam  Vitals and nursing note reviewed.   Constitutional:       General: He is awake. He is not in acute  distress.     Appearance: Normal appearance. He is well-developed. He is obese.      Interventions: Nasal cannula in place.      Comments: Patient sitting up in bed in no distress and in no apparent pain.    Eyes:      Conjunctiva/sclera: Conjunctivae normal.   Cardiovascular:      Rate and Rhythm: Normal rate and regular rhythm.      Heart sounds: Normal heart sounds. No murmur heard.  Pulmonary:      Effort: Pulmonary effort is normal. No respiratory distress.      Breath sounds: Normal breath sounds. No wheezing.   Abdominal:      General: Abdomen is flat. Bowel sounds are normal. There is no distension.      Palpations: Abdomen is soft.      Tenderness: There is no abdominal tenderness.   Musculoskeletal:      Left lower leg: No edema.      Comments: Wound vac in place to right ankle and ACE bandage overlying wound vac.    Skin:     General: Skin is warm.      Findings: No erythema.   Neurological:      Mental Status: He is alert and oriented to person, place, and time.   Psychiatric:         Mood and Affect: Mood normal.         Behavior: Behavior normal. Behavior is cooperative.         Thought Content: Thought content normal.         Judgment: Judgment normal.             Significant Labs: CBC:   Recent Labs   Lab 09/19/24  0256 09/20/24  0434   WBC 12.21 11.16   HGB 7.7* 7.8*   HCT 23.5* 24.2*   * 628*     CMP:   Recent Labs   Lab 09/19/24  0256 09/20/24  0434   * 140   K 4.5 4.0    106   CO2 20* 23   * 171*   BUN 24* 25*   CREATININE 1.2 1.1   CALCIUM 8.1* 8.3*   PROT 6.1 6.2   ALBUMIN 1.5* 1.7*   BILITOT 0.3 0.4   ALKPHOS 195* 199*   AST 25 21   ALT 5* 5*   ANIONGAP 10 11     POCT Glucose:   Recent Labs   Lab 09/20/24  0942 09/20/24  1408 09/20/24  1614   POCTGLUCOSE 223* 193* 177*       Significant Imaging: I have reviewed all pertinent imaging results/findings within the past 24 hours.      Assessment/Plan:      * Surgical wound breakdown  Closed trimalleolar fracture of right  ankle s/p ORIF in 7/2024  Surgical site infection  63 yo male presenting with confusion and worsening redness, swelling and pain to right ankle. Patient with sepsis criteria on admit and right ankle wound felt to be source of infection.   - Ortho consulted and patient taken to OR 09/06/2024 for debridement of surgical wound with wound vac placed. Cultures taken and grew both MSSA and Group B streptococcus from wound. Patient taken back again to OR with Ortho with Dr. Liz and patient had another I&Dand wound vac change on 9/9/2024. Orthopedics took back to OR again on 9/20/2024 with Dr. Liz and underwent another I&D of right ankle and wound and replacement of wound vac.   - Orthopedics recommended Plastics evaluation for free flap and as part of evaluation recommended Vascular evaluation. Vascular evaluated patient and angiogram of right leg done and PTA done and revascularization of right PT/AT. Plastics plan propeller flap on 9/25/2024. Per Plastics will need at least a week in hospital after flap for dangle protocols.  - Wound cultures -- MSSA and Group B strep from right ankle.   - Blood cultures -- MSSA on admit but repeat blood cultures negative.   - ID consulted and following and appreciate recs:  - Continue IV Oxacillin 12 g every 24 hours continuous infusion. Plan for orals if hardware retained once completed.  - Anticipate 6 weeks of IV antibiotics from date of most recent washout.    MSSA bacteremia  - Blood cultures from admit grew MSSA. Repeat blood cultures done on 9/8 and 9/10 no growth.  - Infectious Disease consulted and patient placed on IV Oxacillin infusion as suspected source was right ankle surgical wound infection for bacteremia as grew MSSA from right ankle wound.   - Echo done without concern for vegetations.    Acute renal failure with tubular necrosis  Resolved. DEEPALI is likely due to ATN from contrast in combination with sepsis. Baseline creatinine is  1.0-1.2 . Most recent  creatinine and eGFR are listed below.  Recent Labs     09/18/24  0320 09/19/24  0256 09/20/24  0434   CREATININE 1.2 1.2 1.1   EGFRNORACEVR >60.0 >60.0 >60.0      Plan  - Avoid nephrotoxins and renally dose meds for GFR listed above  - Monitor urine output, serial BMP, and adjust therapy as needed  - Patient with worsening creatinine after contrast studies and Cr peaked at 3.3 on 9/14.  Creatinine   Date Value Ref Range Status   09/20/2024 1.1 0.5 - 1.4 mg/dL Final   09/19/2024 1.2 0.5 - 1.4 mg/dL Final   09/18/2024 1.2 0.5 - 1.4 mg/dL Final   09/17/2024 1.3 0.5 - 1.4 mg/dL Final   09/16/2024 1.8 (H) 0.5 - 1.4 mg/dL Final     - Renal consulted. ATN noted on urine microscopy with casts, secondary to sepsis/contrast likely combination and recommended supportive care.   - Creatinine improved with supportive care and now back down to 1.2 on 9/19.   - Renally dose medications.   - Monitor daily BMP.     Uncontrolled type 2 diabetes mellitus with hyperglycemia  Patient's FSGs are improving on current hypoglycemics. Endocrine consulted and managing patient's diabetes in hospital and appreciate assistance. Patient on insulin pump at [facility] but not in hospital and on basal/prandial insulin in hospital as per Endocrine.   Last A1c reviewed-   Lab Results   Component Value Date    LABA1C 10.8 (H) 08/15/2016    HGBA1C 8.5 (H) 09/06/2024     Most recent fingerstick glucose reviewed-   Recent Labs   Lab 09/20/24  0750 09/20/24  0942 09/20/24  1408 09/20/24  1614   POCTGLUCOSE 232* 223* 193* 177*     Current correctional scale  Low  Maintain anti-hyperglycemic dose as follows-   Antihyperglycemics (From admission, onward)    Start     Stop Route Frequency Ordered    09/21/24 0900  insulin glargine U-100 (Lantus) pen 34 Units         -- SubQ Daily 09/20/24 0844    09/20/24 1524  insulin aspart U-100 pen 0-10 Units         -- SubQ Before meals & nightly PRN 09/20/24 1424    09/20/24 1130  insulin aspart U-100 pen 2-8 Units         --  SubQ 3 times daily with meals 09/20/24 0812         - Endocrine consulted:  - At this time, recommend that the patient remain off of his pump since he cannot be controlled in the Auto mode. Patient does not interact with pump frequently and relies on the auto mode algorithm.   - Insulin dosing as per Endocrine recommendations.   - Hold oral antihyperglycemics during hospitalization   - Monitor blood sugars with meals and at bedtime in hospital.  - Target blood sugars 140-180 in hospital.  - Diabetic diet.     Metabolic acidosis  Improved. HCO3 up to 23 on 9/20 from 20 on 9/19. Related to DEEPALI. Patient started on sodium bicarbonate tablets and will continue. Monitor daily HCO3 levels with labs.       Acute hypoxemic respiratory failure  Resolved. Patient on room air on 9/20.   Improving. Patient with Hypoxic Respiratory failure which is Acute.  he is not on home oxygen. Supplemental oxygen was provided and noted- 2 liters of oxygen.     Contributing diagnoses includes - Obesity Hypoventilation and Pneumonia - that was treated with 5 days of oral Doxycyline ended on 9/14. Labs and images were reviewed. Patient Has not had a recent ABG. Will treat underlying causes and adjust management of respiratory failure as follows-     - CT chest showing tracheomalachia/hyperinflation lungs. Has no history of excessive intubation or lung disease known to him. Using IS on exam and speaking in full sentences on exam, with small amount of clear/white sputum. No obvious signs of volume overload.  - CTA ordered and done and negative for PE, but small ground glass area to RUL representing infectious vs. Inflammatory process. Treated with 5 days of oral Doxycyline for suspected pneumonia.   - Mucinex BID, Acapella, and nebulized saline. Encourage IS use.  - prn duo nebs  prn Duoneb treatments for wheezing or SOB.   - Wean oxygen as tolerated to keep oxygen sats > 90%.     Hyponatremia  Resolved. Sodium back to normal at 140 on 9/20.    Hyponatremia is likely due to Dehydration/hypovolemia. The patient's most recent sodium results are listed below.  Recent Labs     09/18/24  0320 09/19/24  0256 09/20/24  0434   * 134* 140     Plan  - Correct the sodium by 4-6mEq in 24 hours.   - Encourage oral intake.   - Monitor sodium Daily. Patient asymptomatic.   - Patient hyponatremia is stable.     Gastroesophageal reflux disease without esophagitis  Controlled. Continue Carafate every 6 hours po to treat.       Airway malacia  Noted on CT scan of chest. Patient with no acute issues and incidentally note don CT scan of chest. Patient asymptomatic.       Acute blood loss anemia  Controlled. Anemia is likely due to acute blood loss which was from surgery. Most recent hemoglobin and hematocrit are listed below.  Recent Labs     09/18/24 0320 09/19/24  0256 09/20/24  0434   HGB 8.4* 7.7* 7.8*   HCT 25.3* 23.5* 24.2*     Plan  - Monitor serial CBC: Daily  - Transfuse PRBC if patient becomes hemodynamically unstable, symptomatic or H/H drops below 7/21.  - Patient has not received any PRBC transfusions to date  - Patient's anemia is currently stable. No indication for blood transfusion.     PAD (peripheral artery disease)  Present on admit. Patient noted to have high grade stenosis on CTA of right leg. Vascular surgery consulted and patient taken to OR and had unilateral angiogram of right leg and had PT/AT artery stenosis on 9/17 and underwent PTA of right PT/AT arteries. Appreciate Vascular surgery assistance on case.       Transaminitis  Truvada on hold as concern possible cause of elevated AST and ALT note don labs. AST and ALT back to normal off Truvada and continue to hold.     Thrombocytosis  Present on admit and related to infection causing increase in platelet count. Monitor daily CBC.       On pre-exposure prophylaxis for HIV  Patient on Truvada as outpatient but held in hospital due to elevated AST and ALT that has resolved.     Class 1 obesity  due to excess calories with serious comorbidity and body mass index (BMI) of 34.0 to 34.9 in adult  Body mass index is 34.12 kg/m². Morbid obesity complicates all aspects of disease management from diagnostic modalities to treatment. Weight loss encouraged and health benefits explained to patient.         VTE Risk Mitigation (From admission, onward)         Ordered     enoxaparin injection 40 mg  Daily         09/18/24 0808     IP VTE HIGH RISK PATIENT  Once         09/06/24 1735                Discharge Planning   JAYLEEN: 10/2/2024     Code Status: Full Code   Is the patient medically ready for discharge?:     Reason for patient still in hospital (select all that apply): Patient trending condition  SNF         Raisa Kearns MD  Department of Hospital Medicine   [facility] - Surgery

## 2024-09-20 NOTE — ASSESSMENT & PLAN NOTE
Noted on CT scan of chest. Patient with no acute issues and incidentally note don CT scan of chest. Patient asymptomatic.

## 2024-09-20 NOTE — ASSESSMENT & PLAN NOTE
Closed trimalleolar fracture of right ankle s/p ORIF in 7/2024  Surgical site infection  65 yo male presenting with confusion and worsening redness, swelling and pain to right ankle. Patient with sepsis criteria on admit and right ankle wound felt to be source of infection.   - Ortho consulted and patient taken to OR 09/06/2024 for debridement of surgical wound with wound vac placed. Cultures taken and grew both MSSA and Group B streptococcus from wound. Patient taken back again to OR with Ortho with Dr. Liz and patient had another I&Dand wound vac change on 9/9/2024. Orthopedics took back to OR again on 9/20/2024 with Dr. Liz and underwent another I&D of right ankle and wound and replacement of wound vac.   - Orthopedics recommended Plastics evaluation for free flap and as part of evaluation recommended Vascular evaluation. Vascular evaluated patient and angiogram of right leg done and PTA done and revascularization of right PT/AT. Plastics plan propeller flap on 9/25/2024. Per Plastics will need at least a week in hospital after flap for dangle protocols.  - Wound cultures -- MSSA and Group B strep from right ankle.   - Blood cultures -- MSSA on admit but repeat blood cultures negative.   - ID consulted and following and appreciate recs:  - Continue IV Oxacillin 12 g every 24 hours continuous infusion. Plan for orals if hardware retained once completed.  - Anticipate 6 weeks of IV antibiotics from date of most recent washout.

## 2024-09-20 NOTE — ANESTHESIA PROCEDURE NOTES
Right Popliteal SS    Patient location during procedure: pre-op   Block not for primary anesthetic.  Reason for block: at surgeon's request and post-op pain management   Post-op Pain Location: Right leg pain   Start time: 9/20/2024 10:02 AM  Timeout: 9/20/2024 10:01 AM   End time: 9/20/2024 10:08 AM    Staffing  Authorizing Provider: Maynor Ragland MD  Performing Provider: Bernarda Caro MD    Staffing  Performed by: Bernarda Caro MD  Authorized by: Maynor Ragland MD    Preanesthetic Checklist  Completed: patient identified, IV checked, site marked, risks and benefits discussed, surgical consent, monitors and equipment checked, pre-op evaluation and timeout performed  Peripheral Block  Patient position: supine  Prep: ChloraPrep  Patient monitoring: heart rate, cardiac monitor, continuous pulse ox, continuous capnometry and frequent blood pressure checks  Block type: popliteal  Laterality: right  Injection technique: single shot  Needle  Needle type: Echogenic   Needle gauge: 20 G  Needle length: 4 in  Needle localization: anatomical landmarks and ultrasound guidance   -ultrasound image captured on disc.  Assessment  Injection assessment: negative aspiration, negative parasthesia and local visualized surrounding nerve  Paresthesia pain: none  Heart rate change: no  Slow fractionated injection: yes  Pain Tolerance: comfortable throughout block  Medications:    Medications: bupivacaine (pf) (MARCAINE) injection 0.5% - Perineural, Other   30 mL - 9/20/2024 10:08:00 AM    Additional Notes  VSS.  [facility] RN monitoring vitals throughout procedure.  Patient tolerated procedure well.

## 2024-09-20 NOTE — OP NOTE
OP NOTE    DOS:  09/20/2024    Preop Dx: Wound breakdown and infection status post ORIF right trimalleolar ankle fracture male with multiple debridements    Poorly controlled diabetes with peripheral vascular disease    Postop Dx: Wound breakdown and infection status post ORIF right trimalleolar ankle fracture male with multiple debridements    Poorly controlled diabetes with peripheral vascular disease    Procedure: Excisional and non excisional debridement right medial ankle skin and subcutaneous tissue the cm    Arthrotomy with irrigation right ankle joint for infection    Wound VAC, less than 50 sq cm    Surgeon: Moe Liz M.D.    Asst:  Rancho Garcia M.D    Anesthesia: Mac plus regional    EBL:  Minimal    IVF:  300 cc crystalloid    Implants: None    Specimens: None    Findings: Some necrotic posterior tissue on the medial side.  Capsule and sealed itself off from prior surgery but we entered ankle joint and irrigated.    Dispo:  To PACU awake/stable       Indications for Procedure:      64-year-old male status post ORIF of right trimalleolar ankle fracture with uncontrolled diabetes and peripheral vascular disease who presented back with wound breakdown and deep infection.  He has been washed out several times now.  He went with vascular surgery for balloon angioplasty.  We are trying to preserve his limb.  I am taking him back for another washout and wound VAC change.    Procedure in Detail:    Patient was identified in preoperative holding area the site was marked.  Regional analgesia was administered.  Patient was wheeled to the operating room and placed on the operating table in supine position.  Monitored anesthesia care was induced.  Patient has been on antibiotics with Ancef was also added.  The right lower extremity was scrubbed with chlorhexidine and alcohol.  We then prepped the foot and picked this up.  I then took off the wound VAC from the medial side and took the sponge out over the bone  with sterile gloves.  The rest of the leg was then prepped with chlorhexidine and the wound itself with Betadine paint.  A time-out was undertaken to confirm patient, side, site, surgery, surgeon.  All agreed we proceeded.      I copiously irrigated with normal saline solution over the entire medial wound.  I then used a 10 blade to excise about 5 cm of tissue in the posteromedial horn through the skin subcutaneous layer as this was necrotic.  I got back to healthy bleeding edge.  I again irrigated copiously with normal saline solution.      At a prior surgery the patient had very infected looking tissue in the anteromedial corner and I went through that performed arthrotomy and washout.  This had closed self off since that prior surgery but also looked a bit unhealthy again.  I excised a portion that tissue entered the ankle joint and found some murky fluid.  I again copiously irrigated the entire ankle joint with normal saline solution through the arthrotomy.    At this point I again irrigated with normal saline solution of the medial wound.  I then irrigated with dilute Betadine followed by normal saline solution, Dakin's followed by normal saline solution and finally dilute peroxide followed by normal saline solution.  The solution was were not run through the joint.    At this point there was a little bit of eschar wound breakdown in the distal lateral incision and I removed the staples but the overlying tissue was still overall intact.    I then cut a white sponge followed by a black sponge with the medial wound and placed this gaining good suction seal.  I then created an incisional wound VAC with the lateral side using hydrocolloid on the perimeter followed by a black sponge over Adaptic.  This was then connected to a Y-connector and good suction seal was gained on both sides.  The leg was then overwrapped with Ace bandage.    All instrument and sponge counts were reported correct at the end of the case.   There were no complications.  The patient was awakened and taken to the recovery room in stable condition.    Plan the patient:     I am hoping that after his revascularization he can continue to heal that lateral wound.  We have had a revascularization procedure performed and I am talking to Plastic surgery about the possibilities for free flap on the medial side.  This is a very tenuous leg.  He does have some eschar forming distally in the lateral aspect of the foot as well.  We are hoping to stave off below-knee amputation but it may come to that.  I will take him back to the operating room in several days for a repeat washout to see what is the evolution of his tissue health.    Moe iLz MD

## 2024-09-20 NOTE — SUBJECTIVE & OBJECTIVE
Principal Problem:Surgical wound breakdown    Principal Orthopedic Problem: s/p I&D and wound vac placement on 09/06    Interval History: No issues overnight. RLE pulses remain dopplerable. NPO since midnight for vac exchange/I&D today.         Review of patient's allergies indicates:   Allergen Reactions    Invokana [canagliflozin] Anaphylaxis    Percocet [oxycodone-acetaminophen] Nausea Only and Hallucinations    Biaxin [clarithromycin]     Hydrocodone Other (See Comments)     Dizzy/nausea/hallucinations    Sulfa (sulfonamide antibiotics) Nausea Only and Rash       Current Facility-Administered Medications   Medication    acetaminophen tablet 500 mg    albuterol-ipratropium 2.5 mg-0.5 mg/3 mL nebulizer solution 3 mL    amLODIPine tablet 10 mg    calcium carbonate 200 mg calcium (500 mg) chewable tablet 1,000 mg    dextrose 10% bolus 125 mL 125 mL    dextrose 10% bolus 125 mL 125 mL    dextrose 10% bolus 250 mL 250 mL    dextrose 10% bolus 250 mL 250 mL    enoxaparin injection 40 mg    glucagon (human recombinant) injection 1 mg    glucose chewable tablet 16 g    glucose chewable tablet 24 g    guaiFENesin 12 hr tablet 600 mg    hydrALAZINE tablet 25 mg    insulin aspart U-100 pen 0-10 Units    insulin aspart U-100 pen 4-8 Units    insulin glargine U-100 (Lantus) pen 28 Units    losartan tablet 50 mg    methocarbamoL tablet 500 mg    morphine injection 2 mg    morphine injection 4 mg    nortriptyline capsule 50 mg    ondansetron disintegrating tablet 8 mg    oxacillin 12 g in  mL CONTINUOUS INFUSION    pantoprazole EC tablet 40 mg    polyethylene glycol packet 17 g    senna tablet 8.6 mg    sodium bicarbonate tablet 1,300 mg    sucralfate 100 mg/mL suspension 1 g     Objective:     Vital Signs (Most Recent):  Temp: 97.9 °F (36.6 °C) (09/20/24 0426)  Pulse: 99 (09/20/24 0426)  Resp: 17 (09/20/24 0426)  BP: (!) 170/74 (09/20/24 0426)  SpO2: 95 % (09/20/24 0426) Vital Signs (24h Range):  Temp:  [97.9 °F (36.6  "°C)-98.9 °F (37.2 °C)] 97.9 °F (36.6 °C)  Pulse:  [] 99  Resp:  [17-22] 17  SpO2:  [94 %-97 %] 95 %  BP: (160-186)/(65-93) 170/74     Weight: 93 kg (205 lb 0.4 oz)  Height: 5' 5" (165.1 cm)  Body mass index is 34.12 kg/m².    No intake or output data in the 24 hours ending 09/20/24 0641       Ortho/SPM Exam     AAOx4  NAD  Tachycardic  No increased WOB    RLE  Medial wound vac to good suction  Lateral side with fibrinous exudate and weeping ecchymosis distally  Compartments soft/compressible  Sensation diminished (at baseline)   Active toe dorsiflexion & plantarflexion  WWP. Brisk cap refill      Significant Labs:     Recent Results (from the past 336 hour(s))   Basic metabolic panel    Collection Time: 09/07/24  5:19 AM   Result Value Ref Range    Sodium 132 (L) 136 - 145 mmol/L    Potassium 4.0 3.5 - 5.1 mmol/L    Chloride 104 95 - 110 mmol/L    CO2 18 (L) 23 - 29 mmol/L    BUN 23 8 - 23 mg/dL    Creatinine 1.2 0.5 - 1.4 mg/dL    Calcium 8.5 (L) 8.7 - 10.5 mg/dL    Anion Gap 10 8 - 16 mmol/L         Significant Imaging: I have reviewed and interpreted all pertinent imaging results/findings.  CTA RLE: Multifocal high-grade stenoses throughout the right calf arteries. No arterial occlusion.    CTA chest: No large central PE identified  "

## 2024-09-21 PROBLEM — E87.1 HYPONATREMIA: Status: RESOLVED | Noted: 2024-07-18 | Resolved: 2024-09-21

## 2024-09-21 PROBLEM — R74.01 TRANSAMINITIS: Status: RESOLVED | Noted: 2024-09-09 | Resolved: 2024-09-21

## 2024-09-21 LAB
ALBUMIN SERPL BCP-MCNC: 1.7 G/DL (ref 3.5–5.2)
ALP SERPL-CCNC: 169 U/L (ref 55–135)
ALT SERPL W/O P-5'-P-CCNC: <5 U/L (ref 10–44)
ANION GAP SERPL CALC-SCNC: 9 MMOL/L (ref 8–16)
AST SERPL-CCNC: 17 U/L (ref 10–40)
BASOPHILS # BLD AUTO: 0.08 K/UL (ref 0–0.2)
BASOPHILS NFR BLD: 0.7 % (ref 0–1.9)
BILIRUB SERPL-MCNC: 0.3 MG/DL (ref 0.1–1)
BUN SERPL-MCNC: 21 MG/DL (ref 8–23)
CALCIUM SERPL-MCNC: 8.1 MG/DL (ref 8.7–10.5)
CHLORIDE SERPL-SCNC: 109 MMOL/L (ref 95–110)
CO2 SERPL-SCNC: 20 MMOL/L (ref 23–29)
CREAT SERPL-MCNC: 0.9 MG/DL (ref 0.5–1.4)
DIFFERENTIAL METHOD BLD: ABNORMAL
EOSINOPHIL # BLD AUTO: 0.2 K/UL (ref 0–0.5)
EOSINOPHIL NFR BLD: 1.5 % (ref 0–8)
ERYTHROCYTE [DISTWIDTH] IN BLOOD BY AUTOMATED COUNT: 16.4 % (ref 11.5–14.5)
EST. GFR  (NO RACE VARIABLE): >60 ML/MIN/1.73 M^2
GLUCOSE SERPL-MCNC: 75 MG/DL (ref 70–110)
HCT VFR BLD AUTO: 25.3 % (ref 40–54)
HGB BLD-MCNC: 8.4 G/DL (ref 14–18)
IMM GRANULOCYTES # BLD AUTO: 0.04 K/UL (ref 0–0.04)
IMM GRANULOCYTES NFR BLD AUTO: 0.4 % (ref 0–0.5)
LYMPHOCYTES # BLD AUTO: 1.5 K/UL (ref 1–4.8)
LYMPHOCYTES NFR BLD: 14.2 % (ref 18–48)
MCH RBC QN AUTO: 29.2 PG (ref 27–31)
MCHC RBC AUTO-ENTMCNC: 33.2 G/DL (ref 32–36)
MCV RBC AUTO: 88 FL (ref 82–98)
MONOCYTES # BLD AUTO: 0.7 K/UL (ref 0.3–1)
MONOCYTES NFR BLD: 6.3 % (ref 4–15)
NEUTROPHILS # BLD AUTO: 8.4 K/UL (ref 1.8–7.7)
NEUTROPHILS NFR BLD: 76.9 % (ref 38–73)
NRBC BLD-RTO: 0 /100 WBC
PLATELET # BLD AUTO: 531 K/UL (ref 150–450)
PMV BLD AUTO: 9.2 FL (ref 9.2–12.9)
POCT GLUCOSE: 114 MG/DL (ref 70–110)
POCT GLUCOSE: 121 MG/DL (ref 70–110)
POCT GLUCOSE: 143 MG/DL (ref 70–110)
POCT GLUCOSE: 72 MG/DL (ref 70–110)
POCT GLUCOSE: 76 MG/DL (ref 70–110)
POTASSIUM SERPL-SCNC: 3.4 MMOL/L (ref 3.5–5.1)
PROT SERPL-MCNC: 6.2 G/DL (ref 6–8.4)
RBC # BLD AUTO: 2.88 M/UL (ref 4.6–6.2)
SODIUM SERPL-SCNC: 138 MMOL/L (ref 136–145)
WBC # BLD AUTO: 10.87 K/UL (ref 3.9–12.7)

## 2024-09-21 PROCEDURE — 21400001 HC TELEMETRY ROOM

## 2024-09-21 PROCEDURE — 25000003 PHARM REV CODE 250: Performed by: INTERNAL MEDICINE

## 2024-09-21 PROCEDURE — 25000003 PHARM REV CODE 250: Performed by: STUDENT IN AN ORGANIZED HEALTH CARE EDUCATION/TRAINING PROGRAM

## 2024-09-21 PROCEDURE — 99232 SBSQ HOSP IP/OBS MODERATE 35: CPT | Mod: ,,, | Performed by: NURSE PRACTITIONER

## 2024-09-21 PROCEDURE — 63600175 PHARM REV CODE 636 W HCPCS: Performed by: STUDENT IN AN ORGANIZED HEALTH CARE EDUCATION/TRAINING PROGRAM

## 2024-09-21 PROCEDURE — 80053 COMPREHEN METABOLIC PANEL: CPT | Performed by: HOSPITALIST

## 2024-09-21 PROCEDURE — 36415 COLL VENOUS BLD VENIPUNCTURE: CPT | Performed by: INTERNAL MEDICINE

## 2024-09-21 PROCEDURE — 63600175 PHARM REV CODE 636 W HCPCS: Performed by: HOSPITALIST

## 2024-09-21 PROCEDURE — 85025 COMPLETE CBC W/AUTO DIFF WBC: CPT | Performed by: INTERNAL MEDICINE

## 2024-09-21 PROCEDURE — 36415 COLL VENOUS BLD VENIPUNCTURE: CPT | Performed by: HOSPITALIST

## 2024-09-21 PROCEDURE — 25000003 PHARM REV CODE 250

## 2024-09-21 RX ORDER — INSULIN GLARGINE 100 [IU]/ML
26 INJECTION, SOLUTION SUBCUTANEOUS DAILY
Status: DISCONTINUED | OUTPATIENT
Start: 2024-09-21 | End: 2024-09-22

## 2024-09-21 RX ORDER — POTASSIUM CHLORIDE 20 MEQ/1
40 TABLET, EXTENDED RELEASE ORAL ONCE
Status: COMPLETED | OUTPATIENT
Start: 2024-09-21 | End: 2024-09-21

## 2024-09-21 RX ADMIN — ACETAMINOPHEN 1000 MG: 325 TABLET ORAL at 05:09

## 2024-09-21 RX ADMIN — OXACILLIN 12 G: 2 INJECTION, POWDER, FOR SOLUTION INTRAMUSCULAR; INTRAVENOUS at 05:09

## 2024-09-21 RX ADMIN — SUCRALFATE 1 G: 1 SUSPENSION ORAL at 05:09

## 2024-09-21 RX ADMIN — POTASSIUM CHLORIDE 40 MEQ: 1500 TABLET, EXTENDED RELEASE ORAL at 05:09

## 2024-09-21 RX ADMIN — SENNOSIDES 8.6 MG: 8.6 TABLET, FILM COATED ORAL at 09:09

## 2024-09-21 RX ADMIN — SODIUM BICARBONATE 650 MG TABLET 1300 MG: at 09:09

## 2024-09-21 RX ADMIN — ENOXAPARIN SODIUM 40 MG: 40 INJECTION SUBCUTANEOUS at 05:09

## 2024-09-21 RX ADMIN — LOSARTAN POTASSIUM 50 MG: 25 TABLET, FILM COATED ORAL at 09:09

## 2024-09-21 RX ADMIN — INSULIN ASPART 4 UNITS: 100 INJECTION, SOLUTION INTRAVENOUS; SUBCUTANEOUS at 04:09

## 2024-09-21 RX ADMIN — NORTRIPTYLINE HYDROCHLORIDE 50 MG: 25 CAPSULE ORAL at 09:09

## 2024-09-21 RX ADMIN — AMLODIPINE BESYLATE 10 MG: 10 TABLET ORAL at 09:09

## 2024-09-21 RX ADMIN — SODIUM BICARBONATE 650 MG TABLET 1300 MG: at 02:09

## 2024-09-21 RX ADMIN — INSULIN GLARGINE 26 UNITS: 100 INJECTION, SOLUTION SUBCUTANEOUS at 09:09

## 2024-09-21 RX ADMIN — CHOLECALCIFEROL TAB 25 MCG (1000 UNIT) 1000 UNITS: 25 TAB at 09:09

## 2024-09-21 RX ADMIN — SUCRALFATE 1 G: 1 SUSPENSION ORAL at 12:09

## 2024-09-21 RX ADMIN — INSULIN ASPART 4 UNITS: 100 INJECTION, SOLUTION INTRAVENOUS; SUBCUTANEOUS at 09:09

## 2024-09-21 RX ADMIN — ACETAMINOPHEN 1000 MG: 325 TABLET ORAL at 02:09

## 2024-09-21 RX ADMIN — PANTOPRAZOLE SODIUM 40 MG: 40 TABLET, DELAYED RELEASE ORAL at 09:09

## 2024-09-21 RX ADMIN — INSULIN ASPART 4 UNITS: 100 INJECTION, SOLUTION INTRAVENOUS; SUBCUTANEOUS at 12:09

## 2024-09-21 RX ADMIN — ACETAMINOPHEN 1000 MG: 325 TABLET ORAL at 09:09

## 2024-09-21 NOTE — ASSESSMENT & PLAN NOTE
Resolved. DEEPALI is likely due to ATN from contrast in combination with sepsis. Baseline creatinine is  1.0-1.2 . Most recent creatinine and eGFR are listed below.  Recent Labs     09/19/24  0256 09/20/24  0434 09/21/24  0332   CREATININE 1.2 1.1 0.9   EGFRNORACEVR >60.0 >60.0 >60.0        Plan  - Avoid nephrotoxins and renally dose meds for GFR listed above  - Monitor urine output, serial BMP, and adjust therapy as needed  - Patient with worsening creatinine after contrast studies and Cr peaked at 3.3 on 9/14.  Creatinine   Date Value Ref Range Status   09/21/2024 0.9 0.5 - 1.4 mg/dL Final   09/20/2024 1.1 0.5 - 1.4 mg/dL Final   09/19/2024 1.2 0.5 - 1.4 mg/dL Final   09/18/2024 1.2 0.5 - 1.4 mg/dL Final   09/17/2024 1.3 0.5 - 1.4 mg/dL Final     - Renal consulted. ATN noted on urine microscopy with casts, secondary to sepsis/contrast likely combination and recommended supportive care.   - Creatinine improved with supportive care and now back down to 1.2 on 9/19.   - Renally dose medications.   - Monitor daily BMP.

## 2024-09-21 NOTE — ASSESSMENT & PLAN NOTE
Endocrinology consulted for BG management.   BG goal 140-180    - Lantus (Insulin Glargine 28 units daily  - Novolog 2-8 with meals (Administer   2   units if patient eats 25% of meal, administer 4 units if the patient eats 50% of meal, administer 6 units if patient eats 75% of meal, and administer 8 units if the patient eats 100% of meal.)   - [facility] SSI (150/25)  - BG checks q4hr  - Hypoglycemia protocol in place    ** Please notify Endocrine for any change and/or advance in diet**  ** Please call Endocrine for any BG related issues **    Discharge Planning:   TBD. Please notify endocrinology prior to discharge.

## 2024-09-21 NOTE — ASSESSMENT & PLAN NOTE
Resolved. Sodium back to normal at 140 on 9/20.   Hyponatremia is likely due to Dehydration/hypovolemia. The patient's most recent sodium results are listed below.  Recent Labs     09/19/24  0256 09/20/24  0434 09/21/24  0332   * 140 138       Plan  - Correct the sodium by 4-6mEq in 24 hours.   - Encourage oral intake.   - Monitor sodium Daily. Patient asymptomatic.   - Patient hyponatremia is stable.

## 2024-09-21 NOTE — ASSESSMENT & PLAN NOTE
Closed trimalleolar fracture of right ankle s/p ORIF in 7/2024  Surgical site infection  63 yo male presenting with confusion and worsening redness, swelling and pain to right ankle. Patient with sepsis criteria on admit and right ankle wound felt to be source of infection.   - Ortho consulted and patient taken to OR 09/06/2024 for debridement of surgical wound with wound vac placed. Cultures taken and grew both MSSA and Group B streptococcus from wound. Patient taken back again to OR with Ortho with Dr. Liz and patient had another I&Dand wound vac change on 9/9/2024. Orthopedics took back to OR again on 9/20/2024 with Dr. Liz and underwent another I&D of right ankle and wound and replacement of wound vac.   - Orthopedics recommended Plastics evaluation for free flap and as part of evaluation recommended Vascular evaluation. Vascular evaluated patient and angiogram of right leg done and PTA done and revascularization of right PT/AT. Plastics plan propeller flap on 9/25/2024. Per Plastics will need at least a week in hospital after flap for dangle protocols.  - Wound cultures -- MSSA and Group B strep from right ankle.   - Blood cultures -- MSSA on admit but repeat blood cultures negative.   - ID consulted and following and appreciate recs:  - Continue IV Oxacillin 12 g every 24 hours continuous infusion. Plan for orals if hardware retained once completed.  - Anticipate 6 weeks of IV antibiotics from date of most recent washout.  - Continue PT/OT and non weight bearing to right lower extremity as per Ortho. Continue wound vac as per Ortho to surgical site.  - Pain controlled and continue multimodals.   - Continue Lovenox 40 mg subcutaneous daily for DVT prophylaxis.

## 2024-09-21 NOTE — ASSESSMENT & PLAN NOTE
Patient's FSGs are improving on current hypoglycemics. Endocrine consulted and managing patient's diabetes in hospital and appreciate assistance. Patient on insulin pump at [facility] but not in hospital and on basal/prandial insulin in hospital as per Endocrine. Appreciate Endocrine assistance on this case.   Last A1c reviewed-   Lab Results   Component Value Date    LABA1C 10.8 (H) 08/15/2016    HGBA1C 8.5 (H) 09/06/2024     Most recent fingerstick glucose reviewed-   Recent Labs   Lab 09/20/24 2000 09/21/24  0721 09/21/24  1038 09/21/24  1607   POCTGLUCOSE 152* 76 121* 143*       Current correctional scale  Low  Maintain anti-hyperglycemic dose as follows-   Antihyperglycemics (From admission, onward)      Start     Stop Route Frequency Ordered    09/21/24 0900  insulin glargine U-100 (Lantus) pen 26 Units         -- SubQ Daily 09/21/24 0850    09/20/24 1524  insulin aspart U-100 pen 0-10 Units         -- SubQ Before meals & nightly PRN 09/20/24 1424    09/20/24 1130  insulin aspart U-100 pen 2-8 Units         -- SubQ 3 times daily with meals 09/20/24 0844           - Endocrine consulted:  - At this time, recommend that the patient remain off of his pump since he cannot be controlled in the Auto mode. Patient does not interact with pump frequently and relies on the auto mode algorithm.   - Insulin dosing as per Endocrine recommendations.   - Hold oral antihyperglycemics during hospitalization   - Monitor blood sugars with meals and at bedtime in hospital.  - Target blood sugars 140-180 in hospital.  - Diabetic diet.

## 2024-09-21 NOTE — NURSING
Nurses Note -- 4 Eyes      9/21/2024   7:54 AM      Skin assessed during: Daily Assessment      [x] No Altered Skin Integrity Present    []Prevention Measures Documented      [] Yes- Altered Skin Integrity Present or Discovered   [] LDA Added if Not in Epic (Describe Wound)   [] New Altered Skin Integrity was Present on Admit and Documented in LDA   [] Wound Image Taken    Wound Care Consulted? No    Attending Nurse:  FRANCHESKA Olvera    Second RN/Staff Member:   NIEVES Loja

## 2024-09-21 NOTE — SUBJECTIVE & OBJECTIVE
Interval History: Patient reports pain in right ankle controlled. Patient reports 3/10 right ankle pain. Patient asked about plans going forward and I explained plan is for patient to go back to OR on 9/25 and Plastics to do propeller flap to right ankle to cover wound. Explained to patient that patients usually stay in hospital for 1 week after flap for monitoring and then likely can then consider discharge planning. Patient states he would like to go to [facility] on discharge when medically ready as had good experience earlier this year after his first surgery to repair his broken ankle. Explained to patient will have to se how he is doing once surgeries complete to make determination on discharge disposition. Continue IV Oxacillin as per ID recs. Labs reviewed. Potassium low at 3.4 today and will replace with oral potassium. Hgb improved at 8.4 today and was 7.8 yesterday. Creatinine stable at 0.9. Blood sugars well controlled and Endocrine is managing and appreciate assistance.     Review of Systems   Constitutional:  Negative for fever.   Respiratory:  Negative for cough and shortness of breath.    Cardiovascular:  Negative for chest pain.   Gastrointestinal:  Negative for nausea and vomiting.   Musculoskeletal:  Positive for arthralgias (Right ankle).   Psychiatric/Behavioral:  Negative for agitation and confusion.      Objective:     Vital Signs (Most Recent):  Temp: 98.5 °F (36.9 °C) (09/21/24 1609)  Pulse: 93 (09/21/24 1609)  Resp: 18 (09/21/24 1609)  BP: 154/72 (09/21/24 1609)  SpO2: 95 % (09/21/24 1609) on room air Vital Signs (24h Range):  Temp:  [97.5 °F (36.4 °C)-98.5 °F (36.9 °C)] 98.5 °F (36.9 °C)  Pulse:  [] 93  Resp:  [16-18] 18  SpO2:  [92 %-96 %] 95 %  BP: (154-164)/(72-80) 154/72     Weight: 93 kg (205 lb 0.4 oz)  Body mass index is 34.12 kg/m².    Intake/Output Summary (Last 24 hours) at 9/21/2024 1645  Last data filed at 9/21/2024 1250  Gross per 24 hour   Intake 360 ml   Output 1000  ml   Net -640 ml         Physical Exam  Vitals and nursing note reviewed.   Constitutional:       General: He is awake. He is not in acute distress.     Appearance: Normal appearance. He is well-developed. He is obese.      Comments: Patient sitting up in bed in no distress and in no apparent pain. Patient in good spirits.    Eyes:      Conjunctiva/sclera: Conjunctivae normal.   Cardiovascular:      Rate and Rhythm: Normal rate and regular rhythm.      Heart sounds: Normal heart sounds. No murmur heard.  Pulmonary:      Effort: Pulmonary effort is normal. No respiratory distress.      Breath sounds: Normal breath sounds. No wheezing.   Abdominal:      General: Abdomen is flat. Bowel sounds are normal. There is no distension.      Palpations: Abdomen is soft.      Tenderness: There is no abdominal tenderness.   Musculoskeletal:      Left lower leg: No edema.      Comments: Wound vac in place to right ankle and ACE bandage overlying wound vac.    Skin:     General: Skin is warm.      Findings: No erythema.   Neurological:      Mental Status: He is alert and oriented to person, place, and time.   Psychiatric:         Mood and Affect: Mood normal.         Behavior: Behavior normal. Behavior is cooperative.         Thought Content: Thought content normal.         Judgment: Judgment normal.             Significant Labs: CBC:   Recent Labs   Lab 09/20/24  0434 09/21/24  0816   WBC 11.16 10.87   HGB 7.8* 8.4*   HCT 24.2* 25.3*   * 531*     CMP:   Recent Labs   Lab 09/20/24  0434 09/21/24  0332    138   K 4.0 3.4*    109   CO2 23 20*   * 75   BUN 25* 21   CREATININE 1.1 0.9   CALCIUM 8.3* 8.1*   PROT 6.2 6.2   ALBUMIN 1.7* 1.7*   BILITOT 0.4 0.3   ALKPHOS 199* 169*   AST 21 17   ALT 5* <5*   ANIONGAP 11 9       Significant Imaging: I have reviewed all pertinent imaging results/findings within the past 24 hours.

## 2024-09-21 NOTE — ASSESSMENT & PLAN NOTE
Potassium low at 3.4 on 9/21 and will replace with oral potassium. Patient's most recent potassium results are listed below.   Recent Labs     09/19/24  0256 09/20/24  0434 09/21/24  0332   K 4.5 4.0 3.4*     Plan  - Replete potassium per protocol  - Monitor potassium Daily

## 2024-09-21 NOTE — ASSESSMENT & PLAN NOTE
Cortes Schroeder is a 64 y.o. male with PMH of DM, HTN  and right trimalleolar ankle fracture status post ex fix on 07/18 with subsequent definitive fixation on 07/26 admitted with right ankle wound dehiscence in the setting of uncontrolled diabetes.      He is s/p I&D of R ankle 09/06. Repeat I&D R medial ankle 09/09. 9/17 angiography and medial ankle wound vac exchange.     To OR 9/20 for repeat I&D and vac exchange. Free flap pending plastics eval.     Diet: okay for diet  Pain control: multimodal regimen  PT/OT:  NWB RLE   DVT PPx: Lvx   Abx:  oxacillin continuous; ID following   Cultures:  ankle cx staph +    Dispo: I&D and vac exchange 9/23 + pending plastics eval for free flap

## 2024-09-21 NOTE — PROGRESS NOTES
[facility]  Orthopedics  Progress Note    Patient Name: Cortes Schroeder  MRN: [id number]  Admission Date: 9/6/2024  Hospital Length of Stay: 15 days  Attending Provider: Raisa Kearns MD  Primary Care Provider: Andrew Sinclair Jr., MD  Follow-up For: Procedure(s) (LRB):  REPLACEMENT, WOUND VAC (Right)  IRRIGATION AND DEBRIDEMENT (Right)    Post-Operative Day: 1 Day Post-Op  Subjective:     Principal Problem:Surgical wound breakdown    Principal Orthopedic Problem: s/p I&D and wound vac placement on 09/06, 09/20    Interval History: No issues overnight. RLE pulses remain dopplerable. Reports pain has been well-controlled and states he has no pain with active ROM at the ankle.  Denies any progression of baseline decreased sensation at the right foot.  Discussed plan this morning for repeat I&D/vac exchange for Monday, and patient was agreeable with plan.        Review of patient's allergies indicates:   Allergen Reactions    Invokana [canagliflozin] Anaphylaxis    Percocet [oxycodone-acetaminophen] Nausea Only and Hallucinations    Biaxin [clarithromycin]     Hydrocodone Other (See Comments)     Dizzy/nausea/hallucinations    Sulfa (sulfonamide antibiotics) Nausea Only and Rash       Current Facility-Administered Medications   Medication    acetaminophen tablet 1,000 mg    albuterol-ipratropium 2.5 mg-0.5 mg/3 mL nebulizer solution 3 mL    amLODIPine tablet 10 mg    calcium carbonate 200 mg calcium (500 mg) chewable tablet 1,000 mg    dextrose 10% bolus 125 mL 125 mL    dextrose 10% bolus 125 mL 125 mL    dextrose 10% bolus 250 mL 250 mL    dextrose 10% bolus 250 mL 250 mL    enoxaparin injection 40 mg    glucagon (human recombinant) injection 1 mg    glucose chewable tablet 16 g    glucose chewable tablet 24 g    guaiFENesin 12 hr tablet 600 mg    hydrALAZINE tablet 25 mg    insulin aspart U-100 pen 0-10 Units    insulin aspart U-100 pen 2-8 Units    insulin glargine U-100 (Lantus) pen 34 Units     "losartan tablet 50 mg    methocarbamoL tablet 500 mg    morphine injection 2 mg    morphine injection 4 mg    nortriptyline capsule 50 mg    ondansetron disintegrating tablet 8 mg    oxacillin 12 g in  mL CONTINUOUS INFUSION    pantoprazole EC tablet 40 mg    polyethylene glycol packet 17 g    senna tablet 8.6 mg    sodium bicarbonate tablet 1,300 mg    sucralfate 100 mg/mL suspension 1 g    vitamin D 1000 units tablet 1,000 Units     Objective:     Vital Signs (Most Recent):  Temp: 97.5 °F (36.4 °C) (09/21/24 0754)  Pulse: 95 (09/21/24 0754)  Resp: 16 (09/21/24 0754)  BP: (!) 160/74 (09/21/24 0754)  SpO2: 95 % (09/21/24 0754) Vital Signs (24h Range):  Temp:  [97.3 °F (36.3 °C)-98.6 °F (37 °C)] 97.5 °F (36.4 °C)  Pulse:  [] 95  Resp:  [16-26] 16  SpO2:  [92 %-100 %] 95 %  BP: (141-206)/(69-89) 160/74     Weight: 93 kg (205 lb 0.4 oz)  Height: 5' 5" (165.1 cm)  Body mass index is 34.12 kg/m².      Intake/Output Summary (Last 24 hours) at 9/21/2024 0756  Last data filed at 9/20/2024 2127  Gross per 24 hour   Intake 500 ml   Output 500 ml   Net 0 ml          Ortho/SPM Exam     AAOx4  NAD  Tachycardic  No increased WOB    RLE  Medial wound vac to good suction  Lateral side with fibrinous exudate and weeping ecchymosis distally  Compartments soft/compressible  Sensation diminished (at baseline)   Active toe dorsiflexion & plantarflexion  WWP. Brisk cap refill      Significant Labs:     No results found for this or any previous visit (from the past 336 hour(s)).        Significant Imaging: I have reviewed and interpreted all pertinent imaging results/findings.  CTA RLE: Multifocal high-grade stenoses throughout the right calf arteries. No arterial occlusion.    CTA chest: No large central PE identified  Assessment/Plan:     * Surgical wound breakdown  Cortes Schroeder is a 64 y.o. male with PMH of DM, HTN  and right trimalleolar ankle fracture status post ex fix on 07/18 with subsequent definitive fixation on " 07/26 admitted with right ankle wound dehiscence in the setting of uncontrolled diabetes.      He is s/p I&D of R ankle 09/06. Repeat I&D R medial ankle 09/09. 9/17 angiography and medial ankle wound vac exchange.     To OR 9/20 for repeat I&D and vac exchange. Free flap pending plastics eval.     Diet: okay for diet  Pain control: multimodal regimen  PT/OT:  NWB RLE   DVT PPx: Lvx   Abx:  oxacillin continuous; ID following   Cultures:  ankle cx staph +    Dispo: I&D and vac exchange 9/23 + pending plastics eval for free flap           DEEPTHI Garcia MD  Orthopedics  [facility] - Surgery

## 2024-09-21 NOTE — ASSESSMENT & PLAN NOTE
Resolved. Truvada on hold as concern possible cause of elevated AST and ALT note don labs. AST and ALT back to normal off Truvada and continue to hold.

## 2024-09-21 NOTE — PROGRESS NOTES
[facility]  Endocrinology  Progress Note    Admit Date: 2024     Reason for Consult: Management of T2DM, Hyperglycemia      Surgical Procedure and Date: S/P irrigation debridement of RLE on 2024    Diabetes diagnosis year:      Home Diabetes Medications:    Humalog via OmniPod insulin pump  Mounjaro 10 mg weekly     Current pump settings:  Basal 12 am to 2.3 and 6 am to 1.55  ICR to 3 at 12 am, 2 at 4 pm  ISF to 1:20   AIT 3 hrs  Target 110-120        Patient had anaphylaxis reaction to SGLT2 inhibitors specifically Invokana     How often checking glucose at home? Dexcom G6   BG readings on regimen: Average 210's per Dexcom  Hypoglycemia on the regimen?  Yes  Missed doses on regimen?  No     Diabetes Complications include:     Hyperglycemia and Diabetic peripheral neuropathy         Complicating diabetes co morbidities:   HLD, HTN, Obesity         HPI: 64 y.o. male presents to the ED w/ complaint of altered mental status. Hx of R ankle fracture with surgery over a month ago. Patient now presents with sepsis 2/2 R ankle infection s/p ORIF on . Started on IV vanc and cefepime. Given IVFs. Blood cultures pending. Brought to OR with ortho on  for irrigation debridement of RLE. Endocrine following for BG and type 2 diabetes.     Lab Results   Component Value Date    LABA1C 10.8 (H) 08/15/2016    HGBA1C 8.5 (H) 2024         Interval HPI:   Overnight events: No acute events overnight. Patient in room 542/542 A. Blood glucose stable. BG at goal on current insulin regimen (SSI, prandial, and basal insulin ). Steroid use- None. 1 Day Post-Op  Renal function- Normal   Vasopressors-  None       Endocrine will continue to follow and manage insulin orders inpatient.         Diet diabetic  Calorie     Eatin%  Nausea: No  Hypoglycemia and intervention: No  Fever: No  TPN and/or TF: No      BP (!) 160/74 (BP Location: Left arm, Patient Position: Lying)   Pulse 95   Temp 97.5 °F  "(36.4 °C) (Oral)   Resp 16   Ht 5' 5" (1.651 m)   Wt 93 kg (205 lb 0.4 oz)   SpO2 95%   BMI 34.12 kg/m²     Labs Reviewed and Include    Recent Labs   Lab 09/21/24  0332   GLU 75   CALCIUM 8.1*   ALBUMIN 1.7*   PROT 6.2      K 3.4*   CO2 20*      BUN 21   CREATININE 0.9   ALKPHOS 169*   ALT <5*   AST 17   BILITOT 0.3     Lab Results   Component Value Date    WBC 10.87 09/21/2024    HGB 8.4 (L) 09/21/2024    HCT 25.3 (L) 09/21/2024    MCV 88 09/21/2024     (H) 09/21/2024     No results for input(s): "TSH", "FREET4" in the last 168 hours.  Lab Results   Component Value Date    HGBA1C 8.5 (H) 09/06/2024       Nutritional status:   Body mass index is 34.12 kg/m².  Lab Results   Component Value Date    ALBUMIN 1.7 (L) 09/21/2024    ALBUMIN 1.7 (L) 09/20/2024    ALBUMIN 1.5 (L) 09/19/2024     Lab Results   Component Value Date    PREALBUMIN 3 (L) 09/06/2024    PREALBUMIN 13 (L) 07/18/2024       Estimated Creatinine Clearance: 88.1 mL/min (based on SCr of 0.9 mg/dL).    Accu-Checks  Recent Labs     09/19/24  0718 09/19/24  1116 09/19/24  1612 09/19/24  2354 09/20/24  0750 09/20/24  0942 09/20/24  1408 09/20/24  1614 09/20/24  2000 09/21/24  0721   POCTGLUCOSE 304* 281* 244* 172* 232* 223* 193* 177* 152* 76       Current Medications and/or Treatments Impacting Glycemic Control  Immunotherapy:    Immunosuppressants       None          Steroids:   Hormones (From admission, onward)      None          Pressors:    Autonomic Drugs (From admission, onward)      None          Hyperglycemia/Diabetes Medications:   Antihyperglycemics (From admission, onward)      Start     Stop Route Frequency Ordered    09/21/24 0900  insulin glargine U-100 (Lantus) pen 26 Units         -- SubQ Daily 09/21/24 0850    09/20/24 1524  insulin aspart U-100 pen 0-10 Units         -- SubQ Before meals & nightly PRN 09/20/24 1424    09/20/24 1130  insulin aspart U-100 pen 2-8 Units         -- SubQ 3 times daily with meals 09/20/24 " 0844            ASSESSMENT and PLAN    Renal/  Acute renal failure with tubular necrosis  Titrate insulin slowly to avoid hypoglycemia as the risk of hypoglycemia increases with decreased creatinine clearance.  Estimated Creatinine Clearance: 88.1 mL/min (based on SCr of 0.9 mg/dL).        Endocrine  Uncontrolled type 2 diabetes mellitus with hyperglycemia  Endocrinology consulted for BG management.   BG goal 140-180    - Lantus (Insulin Glargine 26 units daily (10% reduction due to fasting blood glucose below goal.)    - Novolog 2-8 with meals (Administer   2   units if patient eats 25% of meal, administer 4 units if the patient eats 50% of meal, administer 6 units if patient eats 75% of meal, and administer 8 units if the patient eats 100% of meal.)   - [facility] SSI (150/25)  - BG checks q4hr  - Hypoglycemia protocol in place    ** Please notify Endocrine for any change and/or advance in diet**  ** Please call Endocrine for any BG related issues **    Discharge Planning:   TBD. Please notify endocrinology prior to discharge.        Orthopedic  * Surgical wound breakdown  Optimize BG control to improve wound healing  Managed per primary team               Brandyn Malin, DNP, FNP  Endocrinology  [facility]

## 2024-09-21 NOTE — PT/OT/SLP PROGRESS
"Physical Therapy    Update    Cortes Schroeder   MRN: [id number]    Post-op PT orders placed and acknowledged, POD#1 from repeat R ankle I&D, remains NWB to RLE. Attempted to see for PT re-assessment at 1201, patient resting with eyes closed in bed. Woke with therapist entry to room but refusing any mobility at this time. Pt reporting, "I'm just not feeling it today. Surgery was just yesterday, I haven't gotten any rest, please just let me sleep." Pt states he will plan to at least sit up at side of bed for dinner this evening and try his best to participate in PT tomorrow (9/22). Pt aware of likely returning to OR on Monday (9/23) for repeat I&D. No billable units today, will follow back-up tomorrow for PT.    Pj Alberts, PT  9/21/2024  "

## 2024-09-21 NOTE — ASSESSMENT & PLAN NOTE
Titrate insulin slowly to avoid hypoglycemia as the risk of hypoglycemia increases with decreased creatinine clearance.  Estimated Creatinine Clearance: 88.1 mL/min (based on SCr of 0.9 mg/dL).

## 2024-09-21 NOTE — PROGRESS NOTES
[facility]  Progress Note    Patient Name: Cortes Schroeder  MRN: [id number]  Patient Class: IP- Inpatient   Admission Date: 9/6/2024  Length of Stay: 15 days  Attending Physician: Raisa Kearns MD  Primary Care Provider: Andrew Sinclair Jr., MD        Subjective:     Principal Problem:Surgical wound breakdown        HPI:  Cortes Schroeder is a 64 y.o. M with PMHx of anxiety, T2DM, GERD, HLD, HTN who presented to ED for AMS. He had a fall in July that resulted in R trimalleolar fracture and L distal radius fracture. He had external fixation on 07/18 and then ORIF on 07/26 with Dr. Liz. He was prescribed clinda 300 mg on 08/28 for a ten day course. Patient was doing well when he acutely became altered and not acting like himself a few hours ago. Patient family member drove him here from [facility] for ortho consult. R medial ankle wound dehisced with redness and swelling around it. No CPM fever, cough, N/V/D or seizure.    In ED: T max 99.1. SIRS 2/4 with WBC 18 and . CMP notable for Na 127, Cr 1.7, . Phos 1.9. .3. BNP 73. Trop neg. A1c 8.5. R tib fib XR notes There are 2 abandoned anterior-posteriorly oriented pin tracks in the right mid tibial shaft.  On AP views, each of these pin tracks demonstrates a small faint internal density, possibly representing a sequestrum. Ortho and endocrine consulted. Given IV vanc and IV clindamycin. Given 2.7L IVFs. Admitted to hospital medicine for sepsis 2/2 infected hardware.     Overview/Hospital Course:  Cortes Schroeder is a 65 y/o male admitted to hospital medicine for sepsis related to right ankle from surgical wound infection in patient with recent ORIF of right ankle fracture done on 7/26/2024. Patient started on empiric IV Vancomycin and IV Cefepime and given IVF's as per sepsis protocol. Orthopedics consulted and patient taken to OR on 9/6/2024 and had irrigation debridement of right surgical incision and  placement of wound vac.Endocrine consulted to assist in management of his diabetes while in hospital. Blood cultures from admit returned with MSSA. Infectious Disease consulted. Wound culture returned positive for Group B streptococcus and MSSA. Echo done due to bacteremia without concern for vegetations. ID changed antibiotics to IV Oxacillin. Patient with new cough and worsening WBC. CTA chest negative for PE but notes small area of ground glass changes to RUL. Patient placed on 5 day course of po Doxycycline to treat as per ID and completed for possible pneumonia. Patient taken back to OR on 09/09/2024 with Ortho and had another irrigation and debridement and replacement of wound vac to right ankle. Plastics consulted for flap evaluation and recommended vascular evaluation. CTA right lower extremity done and showed moderate atherosclerosis and multifocal high grade stenosis throughout right calf arteries. Dietary consulted for malnutrition and started on Boost supplementation to maximize his nutrition for a flap and wound healing. Vascular surgery consulted per Plastics recs as they would like to pre-optimize blood flow prior to any free flap or pedicled propellar flap. Vascular recommended angiogram of right leg but patient developed DEEPALI. Nephrology consulted and suspected ATN related to contrast nephropathy. Patient placed on supportive care. DEEPALI resolved. Patyoanetn taken for unilateral angiogram by Vascular on 9/17 and underwent PTA of right posterior tibial artery and right anterior tibial artery. After revascularization of right leg pl;an to do flap but working on timing with Ortho and Plastics. Patient to go back to OR again on 9/20 for another irrigation and debridement and replacement of wound vac to right ankle wound by Ortho. Plastics plans propeller flap to right ankle on 9/25 and then will need to remain in hospital for 1 week after flap for dangling protocol and monitoring of flap per Plastics. Repeat  blood cultures from 9/8 and 9/10 no growth. Patient taken back to OR on 9/20 with Dr. Moe Liz and had another I&D of right ankle and wound and replacement of wound vac. Patient to remain non weight bearing post-op and Plastics plans flap placement while in hospital on 9/25.     Interval History: Patient reports pain in right ankle controlled. Patient reports 3/10 right ankle pain. Patient asked about plans going forward and I explained plan is for patient to go back to OR on 9/25 and Plastics to do propeller flap to right ankle to cover wound. Explained to patient that patients usually stay in hospital for 1 week after flap for monitoring and then likely can then consider discharge planning. Patient states he would like to go to [facility] on discharge when medically ready as had good experience earlier this year after his first surgery to repair his broken ankle. Explained to patient will have to se how he is doing once surgeries complete to make determination on discharge disposition. Continue IV Oxacillin as per ID recs. Labs reviewed. Potassium low at 3.4 today and will replace with oral potassium. Hgb improved at 8.4 today and was 7.8 yesterday. Creatinine stable at 0.9. Blood sugars well controlled and Endocrine is managing and appreciate assistance.     Review of Systems   Constitutional:  Negative for fever.   Respiratory:  Negative for cough and shortness of breath.    Cardiovascular:  Negative for chest pain.   Gastrointestinal:  Negative for nausea and vomiting.   Musculoskeletal:  Positive for arthralgias (Right ankle).   Psychiatric/Behavioral:  Negative for agitation and confusion.      Objective:     Vital Signs (Most Recent):  Temp: 98.5 °F (36.9 °C) (09/21/24 1609)  Pulse: 93 (09/21/24 1609)  Resp: 18 (09/21/24 1609)  BP: 154/72 (09/21/24 1609)  SpO2: 95 % (09/21/24 1609) on room air Vital Signs (24h Range):  Temp:  [97.5 °F (36.4 °C)-98.5 °F (36.9 °C)] 98.5 °F (36.9 °C)  Pulse:  []  93  Resp:  [16-18] 18  SpO2:  [92 %-96 %] 95 %  BP: (154-164)/(72-80) 154/72     Weight: 93 kg (205 lb 0.4 oz)  Body mass index is 34.12 kg/m².    Intake/Output Summary (Last 24 hours) at 9/21/2024 1645  Last data filed at 9/21/2024 1250  Gross per 24 hour   Intake 360 ml   Output 1000 ml   Net -640 ml         Physical Exam  Vitals and nursing note reviewed.   Constitutional:       General: He is awake. He is not in acute distress.     Appearance: Normal appearance. He is well-developed. He is obese.      Comments: Patient sitting up in bed in no distress and in no apparent pain. Patient in good spirits.    Eyes:      Conjunctiva/sclera: Conjunctivae normal.   Cardiovascular:      Rate and Rhythm: Normal rate and regular rhythm.      Heart sounds: Normal heart sounds. No murmur heard.  Pulmonary:      Effort: Pulmonary effort is normal. No respiratory distress.      Breath sounds: Normal breath sounds. No wheezing.   Abdominal:      General: Abdomen is flat. Bowel sounds are normal. There is no distension.      Palpations: Abdomen is soft.      Tenderness: There is no abdominal tenderness.   Musculoskeletal:      Left lower leg: No edema.      Comments: Wound vac in place to right ankle and ACE bandage overlying wound vac.    Skin:     General: Skin is warm.      Findings: No erythema.   Neurological:      Mental Status: He is alert and oriented to person, place, and time.   Psychiatric:         Mood and Affect: Mood normal.         Behavior: Behavior normal. Behavior is cooperative.         Thought Content: Thought content normal.         Judgment: Judgment normal.             Significant Labs: CBC:   Recent Labs   Lab 09/20/24  0434 09/21/24  0816   WBC 11.16 10.87   HGB 7.8* 8.4*   HCT 24.2* 25.3*   * 531*     CMP:   Recent Labs   Lab 09/20/24  0434 09/21/24  0332    138   K 4.0 3.4*    109   CO2 23 20*   * 75   BUN 25* 21   CREATININE 1.1 0.9   CALCIUM 8.3* 8.1*   PROT 6.2 6.2   ALBUMIN  1.7* 1.7*   BILITOT 0.4 0.3   ALKPHOS 199* 169*   AST 21 17   ALT 5* <5*   ANIONGAP 11 9       Significant Imaging: I have reviewed all pertinent imaging results/findings within the past 24 hours.    Assessment/Plan:      * Surgical wound breakdown  Closed trimalleolar fracture of right ankle s/p ORIF in 7/2024  Surgical site infection  63 yo male presenting with confusion and worsening redness, swelling and pain to right ankle. Patient with sepsis criteria on admit and right ankle wound felt to be source of infection.   - Ortho consulted and patient taken to OR 09/06/2024 for debridement of surgical wound with wound vac placed. Cultures taken and grew both MSSA and Group B streptococcus from wound. Patient taken back again to OR with Ortho with Dr. Liz and patient had another I&Dand wound vac change on 9/9/2024. Orthopedics took back to OR again on 9/20/2024 with Dr. Liz and underwent another I&D of right ankle and wound and replacement of wound vac.   - Orthopedics recommended Plastics evaluation for free flap and as part of evaluation recommended Vascular evaluation. Vascular evaluated patient and angiogram of right leg done and PTA done and revascularization of right PT/AT. Plastics plan propeller flap on 9/25/2024. Per Plastics will need at least a week in hospital after flap for dangle protocols.  - Wound cultures -- MSSA and Group B strep from right ankle.   - Blood cultures -- MSSA on admit but repeat blood cultures negative.   - ID consulted and following and appreciate recs:  - Continue IV Oxacillin 12 g every 24 hours continuous infusion. Plan for orals if hardware retained once completed.  - Anticipate 6 weeks of IV antibiotics from date of most recent washout.  - Continue PT/OT and non weight bearing to right lower extremity as per Ortho. Continue wound vac as per Ortho to surgical site.  - Pain controlled and continue multimodals.   - Continue Lovenox 40 mg subcutaneous daily for DVT  prophylaxis.     MSSA bacteremia  - Blood cultures from admit grew MSSA. Repeat blood cultures done on 9/8 and 9/10 no growth.  - Infectious Disease consulted and patient placed on IV Oxacillin infusion as suspected source was right ankle surgical wound infection for bacteremia as grew MSSA from right ankle wound.   - Echo done without concern for vegetations.    Hypokalemia  Potassium low at 3.4 on 9/21 and will replace with oral potassium. Patient's most recent potassium results are listed below.   Recent Labs     09/19/24  0256 09/20/24  0434 09/21/24  0332   K 4.5 4.0 3.4*     Plan  - Replete potassium per protocol  - Monitor potassium Daily    Uncontrolled type 2 diabetes mellitus with hyperglycemia  Patient's FSGs are improving on current hypoglycemics. Endocrine consulted and managing patient's diabetes in hospital and appreciate assistance. Patient on insulin pump at [facility] but not in hospital and on basal/prandial insulin in hospital as per Endocrine. Appreciate Endocrine assistance on this case.   Last A1c reviewed-   Lab Results   Component Value Date    LABA1C 10.8 (H) 08/15/2016    HGBA1C 8.5 (H) 09/06/2024     Most recent fingerstick glucose reviewed-   Recent Labs   Lab 09/20/24  2000 09/21/24  0721 09/21/24  1038 09/21/24  1607   POCTGLUCOSE 152* 76 121* 143*       Current correctional scale  Low  Maintain anti-hyperglycemic dose as follows-   Antihyperglycemics (From admission, onward)      Start     Stop Route Frequency Ordered    09/21/24 0900  insulin glargine U-100 (Lantus) pen 26 Units         -- SubQ Daily 09/21/24 0850    09/20/24 1524  insulin aspart U-100 pen 0-10 Units         -- SubQ Before meals & nightly PRN 09/20/24 1424    09/20/24 1130  insulin aspart U-100 pen 2-8 Units         -- SubQ 3 times daily with meals 09/20/24 0844           - Endocrine consulted:  - At this time, recommend that the patient remain off of his pump since he cannot be controlled in the Auto mode. Patient does not  interact with pump frequently and relies on the auto mode algorithm.   - Insulin dosing as per Endocrine recommendations.   - Hold oral antihyperglycemics during hospitalization   - Monitor blood sugars with meals and at bedtime in hospital.  - Target blood sugars 140-180 in hospital.  - Diabetic diet.     Metabolic acidosis  Improved. Related to DEEPALI. Patient started on sodium bicarbonate tablets and will continue. Monitor daily HCO3 levels with labs.       Gastroesophageal reflux disease without esophagitis  Controlled. Continue Carafate every 6 hours po to treat.       Airway malacia  Noted on CT scan of chest. Patient with no acute issues and incidentally note don CT scan of chest. Patient asymptomatic.       Acute blood loss anemia  Controlled. Anemia is likely due to acute blood loss which was from surgery. Most recent hemoglobin and hematocrit are listed below.  Recent Labs     09/18/24  0320 09/19/24  0256 09/20/24  0434   HGB 8.4* 7.7* 7.8*   HCT 25.3* 23.5* 24.2*     Plan  - Monitor serial CBC: Daily  - Transfuse PRBC if patient becomes hemodynamically unstable, symptomatic or H/H drops below 7/21.  - Patient has not received any PRBC transfusions to date  - Patient's anemia is currently stable. No indication for blood transfusion.     PAD (peripheral artery disease)  Present on admit. Patient noted to have high grade stenosis on CTA of right leg. Vascular surgery consulted and patient taken to OR and had unilateral angiogram of right leg and had PT/AT artery stenosis on 9/17 and underwent PTA of right PT/AT arteries. Appreciate Vascular surgery assistance on case.       Thrombocytosis  Present on admit and related to infection causing increase in platelet count. Monitor daily CBC.       On pre-exposure prophylaxis for HIV  Patient on Truvada as outpatient but held in hospital due to elevated AST and ALT that has resolved.     Class 1 obesity due to excess calories with serious comorbidity and body mass  index (BMI) of 34.0 to 34.9 in adult  Body mass index is 34.12 kg/m². Morbid obesity complicates all aspects of disease management from diagnostic modalities to treatment. Weight loss encouraged and health benefits explained to patient.       VTE Risk Mitigation (From admission, onward)           Ordered     enoxaparin injection 40 mg  Daily         09/18/24 0808     IP VTE HIGH RISK PATIENT  Once         09/06/24 1730                    Discharge Planning   JAYLEEN: 10/2/2024     Code Status: Full Code   Is the patient medically ready for discharge?:     Reason for patient still in hospital (select all that apply): Patient trending condition  Discharge Plan A: Post acute facility         Raisa Kearns MD  Department of Hospital Medicine   [facility] - Surgery

## 2024-09-21 NOTE — PLAN OF CARE
Problem: Adult Inpatient Plan of Care  Goal: Absence of Hospital-Acquired Illness or Injury  Outcome: Progressing  Goal: Optimal Comfort and Wellbeing  Outcome: Progressing  Goal: Readiness for Transition of Care  Outcome: Progressing     Problem: Fall Injury Risk  Goal: Absence of Fall and Fall-Related Injury  Outcome: Progressing     Problem: Sepsis/Septic Shock  Goal: Optimal Coping  Outcome: Progressing  Goal: Absence of Bleeding  Outcome: Progressing  Goal: Blood Glucose Level Within Targeted Range  Outcome: Progressing  Goal: Absence of Infection Signs and Symptoms  Outcome: Progressing  Goal: Optimal Nutrition Intake  Outcome: Progressing     Problem: Acute Kidney Injury/Impairment  Goal: Fluid and Electrolyte Balance  Outcome: Progressing  Goal: Improved Oral Intake  Outcome: Progressing  Goal: Effective Renal Function  Outcome: Progressing     Problem: Wound  Goal: Optimal Coping  Outcome: Progressing  Goal: Optimal Functional Ability  Outcome: Progressing  Goal: Absence of Infection Signs and Symptoms  Outcome: Progressing  Goal: Improved Oral Intake  Outcome: Progressing  Goal: Optimal Pain Control and Function  Outcome: Progressing  Goal: Skin Health and Integrity  Outcome: Progressing  Goal: Optimal Wound Healing  Outcome: Progressing     Problem: Diabetes Comorbidity  Goal: Blood Glucose Level Within Targeted Range  Outcome: Progressing     Problem: Skin Injury Risk Increased  Goal: Skin Health and Integrity  Outcome: Progressing     Problem: Infection  Goal: Absence of Infection Signs and Symptoms  Outcome: Progressing   Pt aaox4, He refused PT/OT today, citing fatigue  but express willingness  to attempt again tomorrow. Pt is consuming between 25-50% of his meals, reporting lack of appetite. He states that the food is not to his liking and that he is not accustomed to eating this much. Safey measures are in place, bed in the lowest  position with wheels locked, side rails up x2, call light and  personal belongings within reach. Continue plan of care.

## 2024-09-21 NOTE — SUBJECTIVE & OBJECTIVE
Principal Problem:Surgical wound breakdown    Principal Orthopedic Problem: s/p I&D and wound vac placement on 09/06, 09/20    Interval History: No issues overnight. RLE pulses remain dopplerable. Reports pain has been well-controlled and states he has no pain with active ROM at the ankle.  Denies any progression of baseline decreased sensation at the right foot.  Discussed plan this morning for repeat I&D/vac exchange for Monday, and patient was agreeable with plan.        Review of patient's allergies indicates:   Allergen Reactions    Invokana [canagliflozin] Anaphylaxis    Percocet [oxycodone-acetaminophen] Nausea Only and Hallucinations    Biaxin [clarithromycin]     Hydrocodone Other (See Comments)     Dizzy/nausea/hallucinations    Sulfa (sulfonamide antibiotics) Nausea Only and Rash       Current Facility-Administered Medications   Medication    acetaminophen tablet 1,000 mg    albuterol-ipratropium 2.5 mg-0.5 mg/3 mL nebulizer solution 3 mL    amLODIPine tablet 10 mg    calcium carbonate 200 mg calcium (500 mg) chewable tablet 1,000 mg    dextrose 10% bolus 125 mL 125 mL    dextrose 10% bolus 125 mL 125 mL    dextrose 10% bolus 250 mL 250 mL    dextrose 10% bolus 250 mL 250 mL    enoxaparin injection 40 mg    glucagon (human recombinant) injection 1 mg    glucose chewable tablet 16 g    glucose chewable tablet 24 g    guaiFENesin 12 hr tablet 600 mg    hydrALAZINE tablet 25 mg    insulin aspart U-100 pen 0-10 Units    insulin aspart U-100 pen 2-8 Units    insulin glargine U-100 (Lantus) pen 34 Units    losartan tablet 50 mg    methocarbamoL tablet 500 mg    morphine injection 2 mg    morphine injection 4 mg    nortriptyline capsule 50 mg    ondansetron disintegrating tablet 8 mg    oxacillin 12 g in  mL CONTINUOUS INFUSION    pantoprazole EC tablet 40 mg    polyethylene glycol packet 17 g    senna tablet 8.6 mg    sodium bicarbonate tablet 1,300 mg    sucralfate 100 mg/mL suspension 1 g    vitamin D  "1000 units tablet 1,000 Units     Objective:     Vital Signs (Most Recent):  Temp: 97.5 °F (36.4 °C) (09/21/24 0754)  Pulse: 95 (09/21/24 0754)  Resp: 16 (09/21/24 0754)  BP: (!) 160/74 (09/21/24 0754)  SpO2: 95 % (09/21/24 0754) Vital Signs (24h Range):  Temp:  [97.3 °F (36.3 °C)-98.6 °F (37 °C)] 97.5 °F (36.4 °C)  Pulse:  [] 95  Resp:  [16-26] 16  SpO2:  [92 %-100 %] 95 %  BP: (141-206)/(69-89) 160/74     Weight: 93 kg (205 lb 0.4 oz)  Height: 5' 5" (165.1 cm)  Body mass index is 34.12 kg/m².      Intake/Output Summary (Last 24 hours) at 9/21/2024 0756  Last data filed at 9/20/2024 2127  Gross per 24 hour   Intake 500 ml   Output 500 ml   Net 0 ml          Ortho/SPM Exam     AAOx4  NAD  Tachycardic  No increased WOB    RLE  Medial wound vac to good suction  Lateral side with fibrinous exudate and weeping ecchymosis distally  Compartments soft/compressible  Sensation diminished (at baseline)   Active toe dorsiflexion & plantarflexion  WWP. Brisk cap refill      Significant Labs:     No results found for this or any previous visit (from the past 336 hour(s)).        Significant Imaging: I have reviewed and interpreted all pertinent imaging results/findings.  CTA RLE: Multifocal high-grade stenoses throughout the right calf arteries. No arterial occlusion.    CTA chest: No large central PE identified  "

## 2024-09-21 NOTE — SUBJECTIVE & OBJECTIVE
"Interval HPI:   Overnight events: No acute events overnight. Patient in room 542/542 A. Blood glucose stable. BG at goal on current insulin regimen (SSI, prandial, and basal insulin ). Steroid use- None. 1 Day Post-Op  Renal function- Normal   Vasopressors-  None       Endocrine will continue to follow and manage insulin orders inpatient.         Diet diabetic  Calorie     Eatin%  Nausea: No  Hypoglycemia and intervention: No  Fever: No  TPN and/or TF: No      BP (!) 160/74 (BP Location: Left arm, Patient Position: Lying)   Pulse 95   Temp 97.5 °F (36.4 °C) (Oral)   Resp 16   Ht 5' 5" (1.651 m)   Wt 93 kg (205 lb 0.4 oz)   SpO2 95%   BMI 34.12 kg/m²     Labs Reviewed and Include    Recent Labs   Lab 24  0332   GLU 75   CALCIUM 8.1*   ALBUMIN 1.7*   PROT 6.2      K 3.4*   CO2 20*      BUN 21   CREATININE 0.9   ALKPHOS 169*   ALT <5*   AST 17   BILITOT 0.3     Lab Results   Component Value Date    WBC 10.87 2024    HGB 8.4 (L) 2024    HCT 25.3 (L) 2024    MCV 88 2024     (H) 2024     No results for input(s): "TSH", "FREET4" in the last 168 hours.  Lab Results   Component Value Date    HGBA1C 8.5 (H) 2024       Nutritional status:   Body mass index is 34.12 kg/m².  Lab Results   Component Value Date    ALBUMIN 1.7 (L) 2024    ALBUMIN 1.7 (L) 2024    ALBUMIN 1.5 (L) 2024     Lab Results   Component Value Date    PREALBUMIN 3 (L) 2024    PREALBUMIN 13 (L) 2024       Estimated Creatinine Clearance: 88.1 mL/min (based on SCr of 0.9 mg/dL).    Accu-Checks  Recent Labs     24  0718 24  1116 24  1612 24  2354 24  0750 24  0942 24  1408 24  1614 24  0721   POCTGLUCOSE 304* 281* 244* 172* 232* 223* 193* 177* 152* 76       Current Medications and/or Treatments Impacting Glycemic Control  Immunotherapy:    Immunosuppressants       None          Steroids: "   Hormones (From admission, onward)      None          Pressors:    Autonomic Drugs (From admission, onward)      None          Hyperglycemia/Diabetes Medications:   Antihyperglycemics (From admission, onward)      Start     Stop Route Frequency Ordered    09/21/24 0900  insulin glargine U-100 (Lantus) pen 26 Units         -- SubQ Daily 09/21/24 0850    09/20/24 1524  insulin aspart U-100 pen 0-10 Units         -- SubQ Before meals & nightly PRN 09/20/24 1424    09/20/24 1130  insulin aspart U-100 pen 2-8 Units         -- SubQ 3 times daily with meals 09/20/24 0844

## 2024-09-21 NOTE — ASSESSMENT & PLAN NOTE
Improved. Related to DEEPALI. Patient started on sodium bicarbonate tablets and will continue. Monitor daily HCO3 levels with labs.

## 2024-09-21 NOTE — NURSING
Nurses Note -- 4 Eyes      9/21/2024   2:49 AM      Skin assessed during: Q Shift Change      [x] No Altered Skin Integrity Present    []Prevention Measures Documented      [] Yes- Altered Skin Integrity Present or Discovered   [] LDA Added if Not in Epic (Describe Wound)   [] New Altered Skin Integrity was Present on Admit and Documented in LDA   [] Wound Image Taken    Wound Care Consulted? No    Attending Nurse:  FRANCHESKA Caputo    Second RN/Staff Member: FRANCHESKA Reynaga

## 2024-09-22 ENCOUNTER — ANESTHESIA EVENT (OUTPATIENT)
Dept: SURGERY | Facility: HOSPITAL | Age: 64
DRG: 856 | End: 2024-09-22
Payer: COMMERCIAL

## 2024-09-22 LAB
ALBUMIN SERPL BCP-MCNC: 1.6 G/DL (ref 3.5–5.2)
ALP SERPL-CCNC: 164 U/L (ref 55–135)
ALT SERPL W/O P-5'-P-CCNC: <5 U/L (ref 10–44)
ANION GAP SERPL CALC-SCNC: 8 MMOL/L (ref 8–16)
AST SERPL-CCNC: 17 U/L (ref 10–40)
BILIRUB SERPL-MCNC: 0.3 MG/DL (ref 0.1–1)
BUN SERPL-MCNC: 20 MG/DL (ref 8–23)
CALCIUM SERPL-MCNC: 7.9 MG/DL (ref 8.7–10.5)
CHLORIDE SERPL-SCNC: 108 MMOL/L (ref 95–110)
CO2 SERPL-SCNC: 25 MMOL/L (ref 23–29)
CREAT SERPL-MCNC: 1 MG/DL (ref 0.5–1.4)
ERYTHROCYTE [DISTWIDTH] IN BLOOD BY AUTOMATED COUNT: 16.5 % (ref 11.5–14.5)
EST. GFR  (NO RACE VARIABLE): >60 ML/MIN/1.73 M^2
GLUCOSE SERPL-MCNC: 138 MG/DL (ref 70–110)
HCT VFR BLD AUTO: 24.8 % (ref 40–54)
HGB BLD-MCNC: 7.6 G/DL (ref 14–18)
MCH RBC QN AUTO: 26.9 PG (ref 27–31)
MCHC RBC AUTO-ENTMCNC: 30.6 G/DL (ref 32–36)
MCV RBC AUTO: 88 FL (ref 82–98)
PLATELET # BLD AUTO: 493 K/UL (ref 150–450)
PMV BLD AUTO: 9.4 FL (ref 9.2–12.9)
POCT GLUCOSE: 107 MG/DL (ref 70–110)
POCT GLUCOSE: 115 MG/DL (ref 70–110)
POCT GLUCOSE: 115 MG/DL (ref 70–110)
POCT GLUCOSE: 183 MG/DL (ref 70–110)
POCT GLUCOSE: 37 MG/DL (ref 70–110)
POTASSIUM SERPL-SCNC: 3.7 MMOL/L (ref 3.5–5.1)
PROT SERPL-MCNC: 5.9 G/DL (ref 6–8.4)
RBC # BLD AUTO: 2.83 M/UL (ref 4.6–6.2)
SODIUM SERPL-SCNC: 141 MMOL/L (ref 136–145)
WBC # BLD AUTO: 7.71 K/UL (ref 3.9–12.7)

## 2024-09-22 PROCEDURE — 21400001 HC TELEMETRY ROOM

## 2024-09-22 PROCEDURE — 25000003 PHARM REV CODE 250

## 2024-09-22 PROCEDURE — 97168 OT RE-EVAL EST PLAN CARE: CPT

## 2024-09-22 PROCEDURE — 85027 COMPLETE CBC AUTOMATED: CPT | Performed by: HOSPITALIST

## 2024-09-22 PROCEDURE — 80053 COMPREHEN METABOLIC PANEL: CPT | Performed by: HOSPITALIST

## 2024-09-22 PROCEDURE — 25000003 PHARM REV CODE 250: Performed by: STUDENT IN AN ORGANIZED HEALTH CARE EDUCATION/TRAINING PROGRAM

## 2024-09-22 PROCEDURE — 25000003 PHARM REV CODE 250: Performed by: INTERNAL MEDICINE

## 2024-09-22 PROCEDURE — 99232 SBSQ HOSP IP/OBS MODERATE 35: CPT | Mod: ,,, | Performed by: NURSE PRACTITIONER

## 2024-09-22 PROCEDURE — 63600175 PHARM REV CODE 636 W HCPCS: Performed by: STUDENT IN AN ORGANIZED HEALTH CARE EDUCATION/TRAINING PROGRAM

## 2024-09-22 PROCEDURE — 63600175 PHARM REV CODE 636 W HCPCS: Performed by: HOSPITALIST

## 2024-09-22 PROCEDURE — 36415 COLL VENOUS BLD VENIPUNCTURE: CPT | Performed by: HOSPITALIST

## 2024-09-22 RX ORDER — INSULIN GLARGINE 100 [IU]/ML
22 INJECTION, SOLUTION SUBCUTANEOUS DAILY
Status: DISCONTINUED | OUTPATIENT
Start: 2024-09-22 | End: 2024-09-24

## 2024-09-22 RX ADMIN — SENNOSIDES 8.6 MG: 8.6 TABLET, FILM COATED ORAL at 08:09

## 2024-09-22 RX ADMIN — LOSARTAN POTASSIUM 50 MG: 25 TABLET, FILM COATED ORAL at 09:09

## 2024-09-22 RX ADMIN — PANTOPRAZOLE SODIUM 40 MG: 40 TABLET, DELAYED RELEASE ORAL at 09:09

## 2024-09-22 RX ADMIN — NORTRIPTYLINE HYDROCHLORIDE 50 MG: 25 CAPSULE ORAL at 08:09

## 2024-09-22 RX ADMIN — SODIUM BICARBONATE 650 MG TABLET 1300 MG: at 02:09

## 2024-09-22 RX ADMIN — SODIUM BICARBONATE 650 MG TABLET 1300 MG: at 09:09

## 2024-09-22 RX ADMIN — OXACILLIN 12 G: 2 INJECTION, POWDER, FOR SOLUTION INTRAMUSCULAR; INTRAVENOUS at 07:09

## 2024-09-22 RX ADMIN — ACETAMINOPHEN 1000 MG: 325 TABLET ORAL at 02:09

## 2024-09-22 RX ADMIN — SUCRALFATE 1 G: 1 SUSPENSION ORAL at 11:09

## 2024-09-22 RX ADMIN — SUCRALFATE 1 G: 1 SUSPENSION ORAL at 05:09

## 2024-09-22 RX ADMIN — INSULIN ASPART 2 UNITS: 100 INJECTION, SOLUTION INTRAVENOUS; SUBCUTANEOUS at 11:09

## 2024-09-22 RX ADMIN — ENOXAPARIN SODIUM 40 MG: 40 INJECTION SUBCUTANEOUS at 05:09

## 2024-09-22 RX ADMIN — HYDRALAZINE HYDROCHLORIDE 25 MG: 25 TABLET ORAL at 11:09

## 2024-09-22 RX ADMIN — AMLODIPINE BESYLATE 10 MG: 10 TABLET ORAL at 09:09

## 2024-09-22 RX ADMIN — ACETAMINOPHEN 1000 MG: 325 TABLET ORAL at 05:09

## 2024-09-22 RX ADMIN — ACETAMINOPHEN 1000 MG: 325 TABLET ORAL at 09:09

## 2024-09-22 RX ADMIN — INSULIN GLARGINE 22 UNITS: 100 INJECTION, SOLUTION SUBCUTANEOUS at 09:09

## 2024-09-22 RX ADMIN — CHOLECALCIFEROL TAB 25 MCG (1000 UNIT) 1000 UNITS: 25 TAB at 09:09

## 2024-09-22 NOTE — NURSING
Nurses Note -- 4 Eyes      9/22/2024   1:58 PM      Skin assessed during: Q Shift Change      [x] No Altered Skin Integrity Present    []Prevention Measures Documented      [] Yes- Altered Skin Integrity Present or Discovered   [] LDA Added if Not in Epic (Describe Wound)   [] New Altered Skin Integrity was Present on Admit and Documented in LDA   [] Wound Image Taken    Wound Care Consulted? No    Attending Nurse:  India Velez Lpn    Second RN/Staff Member:  elvia PRITCHARD

## 2024-09-22 NOTE — PROGRESS NOTES
[facility]  Orthopedics  Progress Note    Patient Name: Cortes Schroeder  MRN: [id number]  Admission Date: 9/6/2024  Hospital Length of Stay: 16 days  Attending Provider: Raisa Kearns MD  Primary Care Provider: Andrew Sinclair Jr., MD  Follow-up For: Procedure(s) (LRB):  REPLACEMENT, WOUND VAC (Right)  IRRIGATION AND DEBRIDEMENT (Right)    Post-Operative Day: 2 Days Post-Op  Subjective:     Principal Problem:Surgical wound breakdown    Principal Orthopedic Problem: s/p I&D and wound vac placement on 09/06, 09/20    Interval History: No issues overnight. RLE pulses remain dopplerable. Reports pain has been well-controlled and states he has no pain with active ROM at the ankle.  Denies any progression of baseline decreased sensation at the right foot.  Discussed plan this morning for repeat I&D/vac exchange for Monday, and patient was agreeable with plan.      NPO midnight. Tentatively planning for OR Wednesday with plastics for soft tissue coverage.       Review of patient's allergies indicates:   Allergen Reactions    Invokana [canagliflozin] Anaphylaxis    Percocet [oxycodone-acetaminophen] Nausea Only and Hallucinations    Biaxin [clarithromycin]     Hydrocodone Other (See Comments)     Dizzy/nausea/hallucinations    Sulfa (sulfonamide antibiotics) Nausea Only and Rash       Current Facility-Administered Medications   Medication    acetaminophen tablet 1,000 mg    albuterol-ipratropium 2.5 mg-0.5 mg/3 mL nebulizer solution 3 mL    amLODIPine tablet 10 mg    calcium carbonate 200 mg calcium (500 mg) chewable tablet 1,000 mg    dextrose 10% bolus 125 mL 125 mL    dextrose 10% bolus 125 mL 125 mL    dextrose 10% bolus 250 mL 250 mL    dextrose 10% bolus 250 mL 250 mL    enoxaparin injection 40 mg    glucagon (human recombinant) injection 1 mg    glucose chewable tablet 16 g    glucose chewable tablet 24 g    guaiFENesin 12 hr tablet 600 mg    hydrALAZINE tablet 25 mg    insulin aspart U-100 pen 0-10  "Units    insulin aspart U-100 pen 2-8 Units    insulin glargine U-100 (Lantus) pen 26 Units    losartan tablet 50 mg    methocarbamoL tablet 500 mg    morphine injection 2 mg    morphine injection 4 mg    nortriptyline capsule 50 mg    ondansetron disintegrating tablet 8 mg    oxacillin 12 g in  mL CONTINUOUS INFUSION    pantoprazole EC tablet 40 mg    polyethylene glycol packet 17 g    senna tablet 8.6 mg    sodium bicarbonate tablet 1,300 mg    sucralfate 100 mg/mL suspension 1 g    vitamin D 1000 units tablet 1,000 Units     Objective:     Vital Signs (Most Recent):  Temp: 98.4 °F (36.9 °C) (09/22/24 0728)  Pulse: 91 (09/22/24 0728)  Resp: 17 (09/22/24 0728)  BP: (!) 152/86 (09/22/24 0728)  SpO2: (!) 92 % (09/22/24 0728) Vital Signs (24h Range):  Temp:  [97.7 °F (36.5 °C)-98.5 °F (36.9 °C)] 98.4 °F (36.9 °C)  Pulse:  [] 91  Resp:  [16-18] 17  SpO2:  [92 %-96 %] 92 %  BP: (152-163)/(72-86) 152/86     Weight: 93 kg (205 lb 0.4 oz)  Height: 5' 5" (165.1 cm)  Body mass index is 34.12 kg/m².      Intake/Output Summary (Last 24 hours) at 9/22/2024 4139  Last data filed at 9/22/2024 0535  Gross per 24 hour   Intake 360 ml   Output 1375 ml   Net -1015 ml          Ortho/SPM Exam     AAOx4  NAD  Tachycardic  No increased WOB    RLE  Medial wound vac to good suction  Lateral side with fibrinous exudate and weeping ecchymosis distally  Compartments soft/compressible  Sensation diminished (at baseline)   Active toe dorsiflexion & plantarflexion  WWP. Brisk cap refill      Significant Labs:     No results found for this or any previous visit (from the past 336 hour(s)).        Significant Imaging: I have reviewed and interpreted all pertinent imaging results/findings.  CTA RLE: Multifocal high-grade stenoses throughout the right calf arteries. No arterial occlusion.    CTA chest: No large central PE identified  Assessment/Plan:     * Surgical wound breakdown  Cortes Schroeder is a 64 y.o. male with PMH of DM, HTN  " and right trimalleolar ankle fracture status post ex fix on 07/18 with subsequent definitive fixation on 07/26 admitted with right ankle wound dehiscence in the setting of uncontrolled diabetes.      He is s/p I&D of R ankle 09/06. Repeat I&D R medial ankle 09/09. 9/17 angiography and medial ankle wound vac exchange.     To OR 9/20 for repeat I&D and vac exchange. Free flap pending plastics eval.     Diet: okay for diet; NPO midnight   Pain control: multimodal regimen  PT/OT:  NWB RLE   DVT PPx: Lvx   Abx:  oxacillin continuous; ID following   Cultures:  ankle cx staph +    Dispo: I&D and vac exchange tomorrow, possible OR with plastics Wednesday 9/25          DEEPTHI Garcia MD  Orthopedics  [facility] - Surgery

## 2024-09-22 NOTE — ASSESSMENT & PLAN NOTE
Anemia is likely due to acute blood loss which was from surgery. Most recent hemoglobin and hematocrit are listed below.  Recent Labs     09/20/24  0434 09/21/24  0816 09/22/24  0215   HGB 7.8* 8.4* 7.6*   HCT 24.2* 25.3* 24.8*       Plan  - Monitor serial CBC: Daily  - Transfuse PRBC if patient becomes hemodynamically unstable, symptomatic or H/H drops below 7/21.  - Patient has not received any PRBC transfusions to date  - Patient's anemia is currently worsening. Will continue current treatment and monitor. No indication for blood transfusion at this time.

## 2024-09-22 NOTE — ASSESSMENT & PLAN NOTE
Resolved. Discontinue sodium bicarbonate tablets on 9/22.   Related to DEEPALI. Patient started on sodium bicarbonate tablets and will continue. Monitor daily HCO3 levels with labs.

## 2024-09-22 NOTE — NURSING
Patient B/P was 171/76 patient offered hydralazine prn as ordered for elevated systolic greater 170 . Patient refused hospital medicine K paged . Dr robledo notified.

## 2024-09-22 NOTE — SUBJECTIVE & OBJECTIVE
Principal Problem:Surgical wound breakdown    Principal Orthopedic Problem: s/p I&D and wound vac placement on 09/06, 09/20    Interval History: No issues overnight. RLE pulses remain dopplerable. Reports pain has been well-controlled and states he has no pain with active ROM at the ankle.  Denies any progression of baseline decreased sensation at the right foot.  Discussed plan this morning for repeat I&D/vac exchange for Monday, and patient was agreeable with plan.      NPO midnight. Tentatively planning for OR Wednesday with plastics for soft tissue coverage.       Review of patient's allergies indicates:   Allergen Reactions    Invokana [canagliflozin] Anaphylaxis    Percocet [oxycodone-acetaminophen] Nausea Only and Hallucinations    Biaxin [clarithromycin]     Hydrocodone Other (See Comments)     Dizzy/nausea/hallucinations    Sulfa (sulfonamide antibiotics) Nausea Only and Rash       Current Facility-Administered Medications   Medication    acetaminophen tablet 1,000 mg    albuterol-ipratropium 2.5 mg-0.5 mg/3 mL nebulizer solution 3 mL    amLODIPine tablet 10 mg    calcium carbonate 200 mg calcium (500 mg) chewable tablet 1,000 mg    dextrose 10% bolus 125 mL 125 mL    dextrose 10% bolus 125 mL 125 mL    dextrose 10% bolus 250 mL 250 mL    dextrose 10% bolus 250 mL 250 mL    enoxaparin injection 40 mg    glucagon (human recombinant) injection 1 mg    glucose chewable tablet 16 g    glucose chewable tablet 24 g    guaiFENesin 12 hr tablet 600 mg    hydrALAZINE tablet 25 mg    insulin aspart U-100 pen 0-10 Units    insulin aspart U-100 pen 2-8 Units    insulin glargine U-100 (Lantus) pen 26 Units    losartan tablet 50 mg    methocarbamoL tablet 500 mg    morphine injection 2 mg    morphine injection 4 mg    nortriptyline capsule 50 mg    ondansetron disintegrating tablet 8 mg    oxacillin 12 g in  mL CONTINUOUS INFUSION    pantoprazole EC tablet 40 mg    polyethylene glycol packet 17 g    senna tablet  "8.6 mg    sodium bicarbonate tablet 1,300 mg    sucralfate 100 mg/mL suspension 1 g    vitamin D 1000 units tablet 1,000 Units     Objective:     Vital Signs (Most Recent):  Temp: 98.4 °F (36.9 °C) (09/22/24 0728)  Pulse: 91 (09/22/24 0728)  Resp: 17 (09/22/24 0728)  BP: (!) 152/86 (09/22/24 0728)  SpO2: (!) 92 % (09/22/24 0728) Vital Signs (24h Range):  Temp:  [97.7 °F (36.5 °C)-98.5 °F (36.9 °C)] 98.4 °F (36.9 °C)  Pulse:  [] 91  Resp:  [16-18] 17  SpO2:  [92 %-96 %] 92 %  BP: (152-163)/(72-86) 152/86     Weight: 93 kg (205 lb 0.4 oz)  Height: 5' 5" (165.1 cm)  Body mass index is 34.12 kg/m².      Intake/Output Summary (Last 24 hours) at 9/22/2024 0755  Last data filed at 9/22/2024 0535  Gross per 24 hour   Intake 360 ml   Output 1375 ml   Net -1015 ml          Ortho/SPM Exam     AAOx4  NAD  Tachycardic  No increased WOB    RLE  Medial wound vac to good suction  Lateral side with fibrinous exudate and weeping ecchymosis distally  Compartments soft/compressible  Sensation diminished (at baseline)   Active toe dorsiflexion & plantarflexion  WWP. Brisk cap refill      Significant Labs:     No results found for this or any previous visit (from the past 336 hour(s)).        Significant Imaging: I have reviewed and interpreted all pertinent imaging results/findings.  CTA RLE: Multifocal high-grade stenoses throughout the right calf arteries. No arterial occlusion.    CTA chest: No large central PE identified  "

## 2024-09-22 NOTE — SUBJECTIVE & OBJECTIVE
Interval History: Patient to go back to OR on for another I&D and wound vac change on 9/23 with Ortho. Patient to go to OR with Plastics on 9/25 for propeller flap to right ankle. Patient has not been eating well in hospital. Patient states nothing tastes good. He states Boost is making him gag. He states the anything he seems to like is cerebral and has been eating raisin bran but that is about it. He states he is getting tired of hospital food. I explained to patient importance of nutrition to help with wound healing with patient and he states he will try and eat more. Patient states pain in right ankle is well controlled and wound vac remains in place. Patient remains on IV Oxacillin to treat right ankle infection. Labs reviewed. Hgb with decrease down to 7.6 from 8.4 yesterday. Patient has no clinical signs of bleeding but need to closely monitor. Potassium improved from 3.4 yesterday to 3.7 today after oral replacement. Blood sugars well controlled.     Review of Systems   Constitutional:  Negative for fever.   Respiratory:  Negative for cough and shortness of breath.    Cardiovascular:  Negative for chest pain.   Gastrointestinal:  Negative for nausea and vomiting.   Musculoskeletal:  Positive for arthralgias (Right ankle).   Psychiatric/Behavioral:  Negative for agitation and confusion.      Objective:     Vital Signs (Most Recent):  Temp: 98.1 °F (36.7 °C) (09/22/24 1556)  Pulse: 93 (09/22/24 1556)  Resp: 17 (09/22/24 1556)  BP: 161/76 (09/22/24 1556)  SpO2: 95 % (09/22/24 1556) on room air Vital Signs (24h Range):  Temp:  [97.7 °F (36.5 °C)-98.6 °F (37 °C)] 98.1 °F (36.7 °C)  Pulse:  [] 93  Resp:  [16-18] 17  SpO2:  [92 %-96 %] 95 %  BP: (152-172)/(73-86) 171/76     Weight: 93 kg (205 lb 0.4 oz)  Body mass index is 34.12 kg/m².    Intake/Output Summary (Last 24 hours) at 9/22/2024 1654  Last data filed at 9/22/2024 1352  Gross per 24 hour   Intake 477 ml   Output 1525 ml   Net -1048 ml          Physical Exam  Vitals and nursing note reviewed.   Constitutional:       General: He is awake. He is not in acute distress.     Appearance: Normal appearance. He is well-developed. He is obese.      Comments: Patient sitting up in bed in no distress and in no apparent pain. Patient in good spirits.    Eyes:      Conjunctiva/sclera: Conjunctivae normal.   Cardiovascular:      Rate and Rhythm: Normal rate and regular rhythm.      Heart sounds: Normal heart sounds. No murmur heard.  Pulmonary:      Effort: Pulmonary effort is normal. No respiratory distress.      Breath sounds: Normal breath sounds. No wheezing.   Abdominal:      General: Abdomen is flat. Bowel sounds are normal. There is no distension.      Palpations: Abdomen is soft.      Tenderness: There is no abdominal tenderness.   Musculoskeletal:      Left lower leg: No edema.      Comments: Wound vac in place to right ankle and ACE bandage overlying wound vac.    Skin:     General: Skin is warm.      Findings: No erythema.   Neurological:      Mental Status: He is alert and oriented to person, place, and time.   Psychiatric:         Mood and Affect: Mood normal.         Behavior: Behavior normal. Behavior is cooperative.         Thought Content: Thought content normal.         Judgment: Judgment normal.             Significant Labs: CBC:   Recent Labs   Lab 09/21/24  0816 09/22/24  0215   WBC 10.87 7.71   HGB 8.4* 7.6*   HCT 25.3* 24.8*   * 493*     CMP:   Recent Labs   Lab 09/21/24  0332 09/22/24  0215    141   K 3.4* 3.7    108   CO2 20* 25   GLU 75 138*   BUN 21 20   CREATININE 0.9 1.0   CALCIUM 8.1* 7.9*   PROT 6.2 5.9*   ALBUMIN 1.7* 1.6*   BILITOT 0.3 0.3   ALKPHOS 169* 164*   AST 17 17   ALT <5* <5*   ANIONGAP 9 8     POCT Glucose:   Recent Labs   Lab 09/22/24  0732 09/22/24  1057 09/22/24  1553   POCTGLUCOSE 115* 183* 107       Significant Imaging: I have reviewed all pertinent imaging results/findings within the past 24 hours.

## 2024-09-22 NOTE — NURSING
Nurses Note -- 4 Eyes      9/21/2024   10:15 PM      Skin assessed during: Q Shift Change      [x] No Altered Skin Integrity Present    []Prevention Measures Documented      [] Yes- Altered Skin Integrity Present or Discovered   [] LDA Added if Not in Epic (Describe Wound)   [] New Altered Skin Integrity was Present on Admit and Documented in LDA   [] Wound Image Taken    Wound Care Consulted? NO    Attending Nurse:  FRANCHESKA Caputo      Second RN/Staff Member:  FRANCHESKA Olvera

## 2024-09-22 NOTE — ASSESSMENT & PLAN NOTE
Closed trimalleolar fracture of right ankle s/p ORIF in 7/2024  Surgical site infection  63 yo male presenting with confusion and worsening redness, swelling and pain to right ankle. Patient with sepsis criteria on admit and right ankle wound felt to be source of infection.   - Ortho consulted and patient taken to OR 9/06/2024 for debridement of surgical wound with wound vac placed. Cultures taken and grew both MSSA and Group B streptococcus from wound. Patient taken back again to OR with Ortho with Dr. Liz and patient had another I&Dand wound vac change on 9/9/2024. Orthopedics took back to OR again on 9/20/2024 with Dr. Liz and underwent another I&D of right ankle and wound and replacement of wound vac. Patient to go back to OR on for another I&D and wound vac change on 9/23 with Ortho. NPO after midnight tonight.   - Orthopedics recommended Plastics evaluation for free flap and as part of evaluation recommended Vascular evaluation. Vascular evaluated patient and angiogram of right leg done and PTA done and revascularization of right PT/AT. Plastics plan propeller flap on 9/25/2024. Per Plastics will need at least a week in hospital after flap for dangle protocols.  - Wound cultures -- MSSA and Group B strep from right ankle.   - Blood cultures -- MSSA on admit but repeat blood cultures negative.   - ID consulted and following and appreciate recs:  - Continue IV Oxacillin 12 g every 24 hours continuous infusion. Plan for orals if hardware retained once completed.  - Anticipate 6 weeks of IV antibiotics from date of most recent washout.  - Continue PT/OT and non weight bearing to right lower extremity as per Ortho. Continue wound vac as per Ortho to surgical site.  - Pain controlled and continue multimodals.   - Continue Lovenox 40 mg subcutaneous daily for DVT prophylaxis.

## 2024-09-22 NOTE — PROGRESS NOTES
[facility]  Endocrinology  Progress Note    Admit Date: 9/6/2024     Reason for Consult: Management of T2DM, Hyperglycemia      Surgical Procedure and Date: S/P irrigation debridement of RLE on 09/06/2024    Diabetes diagnosis year: 1995     Home Diabetes Medications:    Humalog via OmniPod insulin pump  Mounjaro 10 mg weekly     Current pump settings:  Basal 12 am to 2.3 and 6 am to 1.55  ICR to 3 at 12 am, 2 at 4 pm  ISF to 1:20   AIT 3 hrs  Target 110-120        Patient had anaphylaxis reaction to SGLT2 inhibitors specifically Invokana     How often checking glucose at home? Dexcom G6   BG readings on regimen: Average 210's per Dexcom  Hypoglycemia on the regimen?  Yes  Missed doses on regimen?  No     Diabetes Complications include:     Hyperglycemia and Diabetic peripheral neuropathy         Complicating diabetes co morbidities:   HLD, HTN, Obesity         HPI: 64 y.o. male presents to the ED w/ complaint of altered mental status. Hx of R ankle fracture with surgery over a month ago. Patient now presents with sepsis 2/2 R ankle infection s/p ORIF on 07/26. Started on IV vanc and cefepime. Given IVFs. Blood cultures pending. Brought to OR with ortho on 09/06 for irrigation debridement of RLE. Endocrine following for BG and type 2 diabetes.     Lab Results   Component Value Date    LABA1C 10.8 (H) 08/15/2016    HGBA1C 8.5 (H) 09/06/2024         Interval HPI:   Overnight events: No acute events overnight. Patient in room 542/542 A. Blood glucose stable. BG at goal on current insulin regimen (SSI, prandial, and basal insulin ). Steroid use- None. 2 Days Post-Op  Renal function- Normal   Vasopressors-  None     Of note, BG error in POCT BG, patient BG was not 32, poor sample, re-check 115 mg/dL. Decreasing insulin requirements noted.     Endocrine will continue to follow and manage insulin orders inpatient.         Diet NPO Except for: Sips with Medication     Eating:   NPO  Nausea: No  Hypoglycemia and  "intervention: No  Fever: No  TPN and/or TF: No      BP (!) 152/86 (BP Location: Right arm, Patient Position: Lying)   Pulse 91   Temp 98.4 °F (36.9 °C) (Oral)   Resp 17   Ht 5' 5" (1.651 m)   Wt 93 kg (205 lb 0.4 oz)   SpO2 (!) 92%   BMI 34.12 kg/m²     Labs Reviewed and Include    Recent Labs   Lab 09/22/24  0215   *   CALCIUM 7.9*   ALBUMIN 1.6*   PROT 5.9*      K 3.7   CO2 25      BUN 20   CREATININE 1.0   ALKPHOS 164*   ALT <5*   AST 17   BILITOT 0.3     Lab Results   Component Value Date    WBC 7.71 09/22/2024    HGB 7.6 (L) 09/22/2024    HCT 24.8 (L) 09/22/2024    MCV 88 09/22/2024     (H) 09/22/2024     No results for input(s): "TSH", "FREET4" in the last 168 hours.  Lab Results   Component Value Date    HGBA1C 8.5 (H) 09/06/2024       Nutritional status:   Body mass index is 34.12 kg/m².  Lab Results   Component Value Date    ALBUMIN 1.6 (L) 09/22/2024    ALBUMIN 1.7 (L) 09/21/2024    ALBUMIN 1.7 (L) 09/20/2024     Lab Results   Component Value Date    PREALBUMIN 3 (L) 09/06/2024    PREALBUMIN 13 (L) 07/18/2024       Estimated Creatinine Clearance: 79.2 mL/min (based on SCr of 1 mg/dL).    Accu-Checks  Recent Labs     09/20/24  1408 09/20/24  1614 09/20/24  2000 09/21/24  0721 09/21/24  1038 09/21/24  1607 09/21/24  2131 09/21/24  2219 09/22/24  0731 09/22/24  0732   POCTGLUCOSE 193* 177* 152* 76 121* 143* 72 114* 37* 115*       Current Medications and/or Treatments Impacting Glycemic Control  Immunotherapy:    Immunosuppressants       None          Steroids:   Hormones (From admission, onward)      None          Pressors:    Autonomic Drugs (From admission, onward)      None          Hyperglycemia/Diabetes Medications:   Antihyperglycemics (From admission, onward)      Start     Stop Route Frequency Ordered    09/22/24 0900  insulin glargine U-100 (Lantus) pen 22 Units         -- SubQ Daily 09/22/24 0842    09/20/24 1524  insulin aspart U-100 pen 0-10 Units         -- SubQ " Before meals & nightly PRN 09/20/24 1424    09/20/24 1130  insulin aspart U-100 pen 2-8 Units         -- SubQ 3 times daily with meals 09/20/24 0844            ASSESSMENT and PLAN    Cardiac/Vascular  Hypertension, essential  On an ACE-I/ ARB per ADA guidelines.   Uncontrolled HTN can worsen insulin resistance.         Endocrine  Uncontrolled type 2 diabetes mellitus with hyperglycemia  Endocrinology consulted for BG management.   BG goal 140-180    - Lantus (Insulin Glargine 22 units daily (20% reduction due to fasting blood glucose below goal.)   - Novolog 2-8 with meals (Administer   2   units if patient eats 25% of meal, administer 4 units if the patient eats 50% of meal, administer 6 units if patient eats 75% of meal, and administer 8 units if the patient eats 100% of meal.) (Hold while NPO)  - [facility] SSI (150/25)  - BG checks AC/HS  - Hypoglycemia protocol in place    ** Please notify Endocrine for any change and/or advance in diet**  ** Please call Endocrine for any BG related issues **    Discharge Planning:   TBD. Please notify endocrinology prior to discharge.        Orthopedic  * Surgical wound breakdown  Optimize BG control to improve wound healing  Managed per primary team             Brandyn Malin, DNP, FNP  Endocrinology  [facility]

## 2024-09-22 NOTE — PROGRESS NOTES
[facility] Medicine  Progress Note    Patient Name: Cortes Schroeder  MRN: [id number]  Patient Class: IP- Inpatient   Admission Date: 9/6/2024  Length of Stay: 16 days  Attending Physician: Raisa Kearns MD  Primary Care Provider: Andrew Sinclair Jr., MD        Subjective:     Principal Problem:Surgical wound breakdown        HPI:  Cortes Schroeder is a 64 y.o. M with PMHx of anxiety, T2DM, GERD, HLD, HTN who presented to ED for AMS. He had a fall in July that resulted in R trimalleolar fracture and L distal radius fracture. He had external fixation on 07/18 and then ORIF on 07/26 with Dr. Liz. He was prescribed clinda 300 mg on 08/28 for a ten day course. Patient was doing well when he acutely became altered and not acting like himself a few hours ago. Patient family member drove him here from [facility] for ortho consult. R medial ankle wound dehisced with redness and swelling around it. No CPM fever, cough, N/V/D or seizure.    In ED: T max 99.1. SIRS 2/4 with WBC 18 and . CMP notable for Na 127, Cr 1.7, . Phos 1.9. .3. BNP 73. Trop neg. A1c 8.5. R tib fib XR notes There are 2 abandoned anterior-posteriorly oriented pin tracks in the right mid tibial shaft.  On AP views, each of these pin tracks demonstrates a small faint internal density, possibly representing a sequestrum. Ortho and endocrine consulted. Given IV vanc and IV clindamycin. Given 2.7L IVFs. Admitted to hospital medicine for sepsis 2/2 infected hardware.     Overview/Hospital Course:  Cortes Schroeder is a 65 y/o male admitted to hospital medicine for sepsis related to right ankle from surgical wound infection in patient with recent ORIF of right ankle fracture done on 7/26/2024. Patient started on empiric IV Vancomycin and IV Cefepime and given IVF's as per sepsis protocol. Orthopedics consulted and patient taken to OR on 9/6/2024 and had irrigation debridement of right surgical incision and  placement of wound vac.Endocrine consulted to assist in management of his diabetes while in hospital. Blood cultures from admit returned with MSSA. Infectious Disease consulted. Wound culture returned positive for Group B streptococcus and MSSA. Echo done due to bacteremia without concern for vegetations. ID changed antibiotics to IV Oxacillin. Patient with new cough and worsening WBC. CTA chest negative for PE but notes small area of ground glass changes to RUL. Patient placed on 5 day course of po Doxycycline to treat as per ID and completed for possible pneumonia. Patient taken back to OR on 09/09/2024 with Ortho and had another irrigation and debridement and replacement of wound vac to right ankle. Plastics consulted for flap evaluation and recommended vascular evaluation. CTA right lower extremity done and showed moderate atherosclerosis and multifocal high grade stenosis throughout right calf arteries. Dietary consulted for malnutrition and started on Boost supplementation to maximize his nutrition for a flap and wound healing. Vascular surgery consulted per Plastics recs as they would like to pre-optimize blood flow prior to any free flap or pedicled propellar flap. Vascular recommended angiogram of right leg but patient developed DEEPALI. Nephrology consulted and suspected ATN related to contrast nephropathy. Patient placed on supportive care. DEEPALI resolved. Patyoanetn taken for unilateral angiogram by Vascular on 9/17 and underwent PTA of right posterior tibial artery and right anterior tibial artery. After revascularization of right leg pl;an to do flap but working on timing with Ortho and Plastics. Patient to go back to OR again on 9/20 for another irrigation and debridement and replacement of wound vac to right ankle wound by Ortho. Plastics plans propeller flap to right ankle on 9/25 and then will need to remain in hospital for 1 week after flap for dangling protocol and monitoring of flap per Plastics. Repeat  blood cultures from 9/8 and 9/10 no growth. Patient taken back to OR on 9/20 with Dr. Moe Liz and had another I&D of right ankle and wound and replacement of wound vac. Patient to remain non weight bearing post-op and Plastics plans flap placement while in hospital on 9/25. Patient to go back to OR on for another I&D and wound vac change on 9/23.     Interval History: Patient to go back to OR on for another I&D and wound vac change on 9/23 with Ortho. Patient to go to OR with Plastics on 9/25 for propeller flap to right ankle. Patient has not been eating well in hospital. Patient states nothing tastes good. He states Boost is making him gag. He states the anything he seems to like is cerebral and has been eating raisin bran but that is about it. He states he is getting tired of hospital food. I explained to patient importance of nutrition to help with wound healing with patient and he states he will try and eat more. Patient states pain in right ankle is well controlled and wound vac remains in place. Patient remains on IV Oxacillin to treat right ankle infection. Labs reviewed. Hgb with decrease down to 7.6 from 8.4 yesterday. Patient has no clinical signs of bleeding but need to closely monitor. Potassium improved from 3.4 yesterday to 3.7 today after oral replacement. Blood sugars well controlled.     Review of Systems   Constitutional:  Negative for fever.   Respiratory:  Negative for cough and shortness of breath.    Cardiovascular:  Negative for chest pain.   Gastrointestinal:  Negative for nausea and vomiting.   Musculoskeletal:  Positive for arthralgias (Right ankle).   Psychiatric/Behavioral:  Negative for agitation and confusion.      Objective:     Vital Signs (Most Recent):  Temp: 98.1 °F (36.7 °C) (09/22/24 1556)  Pulse: 93 (09/22/24 1556)  Resp: 17 (09/22/24 1556)  BP: 161/76 (09/22/24 1556)  SpO2: 95 % (09/22/24 1556) on room air Vital Signs (24h Range):  Temp:  [97.7 °F (36.5 °C)-98.6 °F (37  °C)] 98.1 °F (36.7 °C)  Pulse:  [] 93  Resp:  [16-18] 17  SpO2:  [92 %-96 %] 95 %  BP: (152-172)/(73-86) 171/76     Weight: 93 kg (205 lb 0.4 oz)  Body mass index is 34.12 kg/m².    Intake/Output Summary (Last 24 hours) at 9/22/2024 1654  Last data filed at 9/22/2024 1352  Gross per 24 hour   Intake 477 ml   Output 1525 ml   Net -1048 ml         Physical Exam  Vitals and nursing note reviewed.   Constitutional:       General: He is awake. He is not in acute distress.     Appearance: Normal appearance. He is well-developed. He is obese.      Comments: Patient sitting up in bed in no distress and in no apparent pain. Patient in good spirits.    Eyes:      Conjunctiva/sclera: Conjunctivae normal.   Cardiovascular:      Rate and Rhythm: Normal rate and regular rhythm.      Heart sounds: Normal heart sounds. No murmur heard.  Pulmonary:      Effort: Pulmonary effort is normal. No respiratory distress.      Breath sounds: Normal breath sounds. No wheezing.   Abdominal:      General: Abdomen is flat. Bowel sounds are normal. There is no distension.      Palpations: Abdomen is soft.      Tenderness: There is no abdominal tenderness.   Musculoskeletal:      Left lower leg: No edema.      Comments: Wound vac in place to right ankle and ACE bandage overlying wound vac.    Skin:     General: Skin is warm.      Findings: No erythema.   Neurological:      Mental Status: He is alert and oriented to person, place, and time.   Psychiatric:         Mood and Affect: Mood normal.         Behavior: Behavior normal. Behavior is cooperative.         Thought Content: Thought content normal.         Judgment: Judgment normal.             Significant Labs: CBC:   Recent Labs   Lab 09/21/24  0816 09/22/24  0215   WBC 10.87 7.71   HGB 8.4* 7.6*   HCT 25.3* 24.8*   * 493*     CMP:   Recent Labs   Lab 09/21/24  0332 09/22/24  0215    141   K 3.4* 3.7    108   CO2 20* 25   GLU 75 138*   BUN 21 20   CREATININE 0.9 1.0    CALCIUM 8.1* 7.9*   PROT 6.2 5.9*   ALBUMIN 1.7* 1.6*   BILITOT 0.3 0.3   ALKPHOS 169* 164*   AST 17 17   ALT <5* <5*   ANIONGAP 9 8     POCT Glucose:   Recent Labs   Lab 09/22/24  0732 09/22/24  1057 09/22/24  1553   POCTGLUCOSE 115* 183* 107       Significant Imaging: I have reviewed all pertinent imaging results/findings within the past 24 hours.    Assessment/Plan:      * Surgical wound breakdown  Closed trimalleolar fracture of right ankle s/p ORIF in 7/2024  Surgical site infection  63 yo male presenting with confusion and worsening redness, swelling and pain to right ankle. Patient with sepsis criteria on admit and right ankle wound felt to be source of infection.   - Ortho consulted and patient taken to OR 9/06/2024 for debridement of surgical wound with wound vac placed. Cultures taken and grew both MSSA and Group B streptococcus from wound. Patient taken back again to OR with Ortho with Dr. Liz and patient had another I&Dand wound vac change on 9/9/2024. Orthopedics took back to OR again on 9/20/2024 with Dr. Liz and underwent another I&D of right ankle and wound and replacement of wound vac. Patient to go back to OR on for another I&D and wound vac change on 9/23 with Ortho. NPO after midnight tonight.   - Orthopedics recommended Plastics evaluation for free flap and as part of evaluation recommended Vascular evaluation. Vascular evaluated patient and angiogram of right leg done and PTA done and revascularization of right PT/AT. Plastics plan propeller flap on 9/25/2024. Per Plastics will need at least a week in hospital after flap for dangle protocols.  - Wound cultures -- MSSA and Group B strep from right ankle.   - Blood cultures -- MSSA on admit but repeat blood cultures negative.   - ID consulted and following and appreciate recs:  - Continue IV Oxacillin 12 g every 24 hours continuous infusion. Plan for orals if hardware retained once completed.  - Anticipate 6 weeks of IV antibiotics from  date of most recent washout.  - Continue PT/OT and non weight bearing to right lower extremity as per Ortho. Continue wound vac as per Ortho to surgical site.  - Pain controlled and continue multimodals.   - Continue Lovenox 40 mg subcutaneous daily for DVT prophylaxis.     MSSA bacteremia  - Blood cultures from admit grew MSSA. Repeat blood cultures done on 9/8 and 9/10 no growth.  - Infectious Disease consulted and patient placed on IV Oxacillin infusion as suspected source was right ankle surgical wound infection for bacteremia as grew MSSA from right ankle wound.   - Echo done without concern for vegetations.    Hypokalemia  Improved with oral replacement. Potassium low at 3.4 on 9/21 and will replace with oral potassium. Patient's most recent potassium results are listed below.   Recent Labs     09/20/24  0434 09/21/24  0332 09/22/24  0215   K 4.0 3.4* 3.7       Plan  - Replete potassium per protocol  - Monitor potassium Daily    Uncontrolled type 2 diabetes mellitus with hyperglycemia  Patient's FSGs are improving on current hypoglycemics. Endocrine consulted and managing patient's diabetes in hospital and appreciate assistance. Patient on insulin pump at [facility] but not in hospital and on basal/prandial insulin in hospital as per Endocrine. Appreciate Endocrine assistance on this case.   Last A1c reviewed-   Lab Results   Component Value Date    LABA1C 10.8 (H) 08/15/2016    HGBA1C 8.5 (H) 09/06/2024     Most recent fingerstick glucose reviewed-   Recent Labs   Lab 09/22/24  0731 09/22/24  0732 09/22/24  1057 09/22/24  1553   POCTGLUCOSE 37* 115* 183* 107       Current correctional scale  Low  Maintain anti-hyperglycemic dose as follows-   Antihyperglycemics (From admission, onward)      Start     Stop Route Frequency Ordered    09/22/24 0900  insulin glargine U-100 (Lantus) pen 22 Units         -- SubQ Daily 09/22/24 0842    09/20/24 1524  insulin aspart U-100 pen 0-10 Units         -- SubQ Before meals &  nightly PRN 09/20/24 1424    09/20/24 1130  insulin aspart U-100 pen 2-8 Units         -- SubQ 3 times daily with meals 09/20/24 0844           - Endocrine consulted:  - At this time, recommend that the patient remain off of his pump since he cannot be controlled in the Auto mode. Patient does not interact with pump frequently and relies on the auto mode algorithm.   - Insulin dosing as per Endocrine recommendations.   - Hold oral antihyperglycemics during hospitalization   - Monitor blood sugars with meals and at bedtime in hospital.  - Target blood sugars 140-180 in hospital.  - Diabetic diet.     Metabolic acidosis  Resolved. Discontinue sodium bicarbonate tablets on 9/22.   Related to DEEPALI. Patient started on sodium bicarbonate tablets and will continue. Monitor daily HCO3 levels with labs.       Gastroesophageal reflux disease without esophagitis  Controlled. Continue Carafate every 6 hours po to treat.       Airway malacia  Noted on CT scan of chest. Patient with no acute issues and incidentally note don CT scan of chest. Patient asymptomatic.       Acute blood loss anemia  Anemia is likely due to acute blood loss which was from surgery. Most recent hemoglobin and hematocrit are listed below.  Recent Labs     09/20/24  0434 09/21/24  0816 09/22/24  0215   HGB 7.8* 8.4* 7.6*   HCT 24.2* 25.3* 24.8*       Plan  - Monitor serial CBC: Daily  - Transfuse PRBC if patient becomes hemodynamically unstable, symptomatic or H/H drops below 7/21.  - Patient has not received any PRBC transfusions to date  - Patient's anemia is currently worsening. Will continue current treatment and monitor. No indication for blood transfusion at this time.     PAD (peripheral artery disease)  Present on admit. Patient noted to have high grade stenosis on CTA of right leg. Vascular surgery consulted and patient taken to OR and had unilateral angiogram of right leg and had PT/AT artery stenosis on 9/17 and underwent PTA of right PT/AT  arteries. Appreciate Vascular surgery assistance on case.       Thrombocytosis  Present on admit and related to infection causing increase in platelet count. Monitor daily CBC.       On pre-exposure prophylaxis for HIV  Patient on Truvada as outpatient but held in hospital due to elevated AST and ALT that has resolved. Plan to resume Truvada on discharge.     Class 1 obesity due to excess calories with serious comorbidity and body mass index (BMI) of 34.0 to 34.9 in adult  Body mass index is 34.12 kg/m². Morbid obesity complicates all aspects of disease management from diagnostic modalities to treatment. Weight loss encouraged and health benefits explained to patient.       VTE Risk Mitigation (From admission, onward)           Ordered     enoxaparin injection 40 mg  Daily         09/18/24 0808     IP VTE HIGH RISK PATIENT  Once         09/06/24 1735                    Discharge Planning   JAYLEEN: 10/2/2024     Code Status: Full Code   Is the patient medically ready for discharge?:     Reason for patient still in hospital (select all that apply): Patient trending condition  Discharge Plan A: Post acute placement          Raisa Kearns MD  Department of Hospital Medicine   [facility] - Surgery

## 2024-09-22 NOTE — ASSESSMENT & PLAN NOTE
Patient on Truvada as outpatient but held in hospital due to elevated AST and ALT that has resolved. Plan to resume Truvada on discharge.

## 2024-09-22 NOTE — ASSESSMENT & PLAN NOTE
Endocrinology consulted for BG management.   BG goal 140-180    - Lantus (Insulin Glargine 22 units daily (20% reduction due to fasting blood glucose below goal.)   - Novolog 2-8 with meals (Administer   2   units if patient eats 25% of meal, administer 4 units if the patient eats 50% of meal, administer 6 units if patient eats 75% of meal, and administer 8 units if the patient eats 100% of meal.) (Hold while NPO)  - [facility] SSI (150/25)  - BG checks AC/HS  - Hypoglycemia protocol in place    ** Please notify Endocrine for any change and/or advance in diet**  ** Please call Endocrine for any BG related issues **    Discharge Planning:   TBD. Please notify endocrinology prior to discharge.

## 2024-09-22 NOTE — PT/OT/SLP RE-EVAL
Occupational Therapy   Re-evaluation    Name: Cortes Schroeder  MRN: [id number]  Admitting Diagnosis:  Surgical wound breakdown  Recent Surgery: Procedure(s) (LRB):  REPLACEMENT, WOUND VAC (Right)  IRRIGATION AND DEBRIDEMENT (Right) 2 Days Post-Op    Recommendations:     Discharge Recommendations: Moderate Intensity Therapy  Discharge Equipment Recommendations: bedside commode  Barriers to discharge:  Decreased caregiver support    Assessment:     Cortes Schroeder is a 64 y.o. male with a medical diagnosis of Surgical wound breakdown.  He presents with fatigue and nausea.  Performance deficits affecting function are weakness, impaired endurance, impaired self care skills, impaired functional mobility, impaired balance, gait instability, decreased lower extremity function, orthopedic precautions.      Rehab Prognosis:  Fair; patient would benefit from acute skilled OT services to address these deficits and reach maximum level of function.       Plan:     Patient to be seen 4 x/week to address the above listed problems via self-care/home management, therapeutic activities, therapeutic exercises  Plan of Care Expires: 10/18/24  Plan of Care Reviewed with: patient    Subjective     Chief Complaint: Nausea and fatigue  Patient/Family stated goals: return home  Communicated with: Henry prior to session.  Pain/Comfort:  Pain Rating 1: 0/10    Objective:     Communicated with: Henry prior to session.  Patient found supine with: telemetry, wound vac upon OT entry to room.    General Precautions: Standard, fall  Orthopedic Precautions: RLE non weight bearing  Braces: N/A  Respiratory Status: Room air    Occupational Performance:    Bed Mobility:    PT raised HOB slightly for more upright posture    Functional Mobility/Transfers:  Functional Mobility: NT 2/2 to pt declined    Activities of Daily Living:  Grooming: stand by assistance facial H using wash cloth; requested cold rag or face to help with nausea    Cognitive/Visual  Perceptual:  A&O x4    Physical Exam:  BUE WFL  Balance:TBD    AMPA 6 Click:  [facility] Total Score: 19    Treatment & Education:  Pt educated on role and purpose of therapy  Pt educated on goal setting  Pt educated on benefits of OOB activity  Pt educated on self advocacy   Pt educated on WB precautions     Patient left HOB elevated with all lines intact, call button in reach, and nsg notified    GOALS:   Multidisciplinary Problems       Occupational Therapy Goals          Problem: Occupational Therapy    Goal Priority Disciplines Outcome Interventions   Occupational Therapy Goal     OT, PT/OT Progressing    Description: Goals to be met by: 10/18/24     Patient will increase functional independence with ADLs by performing:    UE Dressing with Supervision.  LE Dressing with Supervision.  Grooming while seated at sink with Set-up Assistance.  Toileting from toilet with Stand-by Assistance for hygiene and clothing management.   All functional transfers performed with SBA                         History:     Past Medical History:   Diagnosis Date    Anxiety 12/18/2012    Back pain 12/18/2012    Cataract     Chronic pain syndrome 04/24/2016    Diabetes type 2, controlled 02/20/2016    Diabetic retinopathy     DM (diabetes mellitus) 12/18/2012    DM (diabetes mellitus), type 2, uncontrolled 11/16/2013    Gastroesophageal reflux disease without esophagitis 02/20/2016    Hyperlipidemia 12/18/2012    Insomnia 08/07/2014    Neuropathy 11/16/2013         Past Surgical History:   Procedure Laterality Date    ANGIOGRAPHY OF LOWER EXTREMITY Right 9/17/2024    Procedure: Angiogram Extremity Unilateral;  Surgeon: GINA Morton II, MD;  Location: [facility] OR [address] [place] [address];  Service: Vascular;  Laterality: Right;  Fluro time 19.5 min  mGy 260.49    APPLICATION OF WOUND VACUUM-ASSISTED CLOSURE DEVICE Right 9/6/2024    Procedure: APPLICATION, WOUND VAC;  Surgeon: Moe Liz MD;  Location: [facility] OR [address] [place] [address];  Service: Orthopedics;   Laterality: Right;    APPLICATION, EXTERNAL FIXATION DEVICE, FOR ANKLE FRACTURE Right 7/18/2024    Procedure: APPLICATION, EXTERNAL FIXATION DEVICE, FOR ANKLE FRACTURE;  Surgeon: Moe Liz MD;  Location: [facility] OR [address] [place] [address];  Service: Orthopedics;  Laterality: Right;    ARTHROTOMY OF ANKLE Right 9/9/2024    Procedure: ARTHROTOMY, ANKLE;  Surgeon: Moe Liz MD;  Location: [facility] OR [address] [place] [address];  Service: Orthopedics;  Laterality: Right;    BACK SURGERY  2003    Lumbar Spine    CATARACT EXTRACTION      CLOSED REDUCTION, FRACTURE, ANKLE, TRIMALLEOLAR Right 7/18/2024    Procedure: CLOSED REDUCTION, FRACTURE, ANKLE, TRIMALLEOLAR;  Surgeon: Moe Liz MD;  Location: [facility] OR [address] [place] [address];  Service: Orthopedics;  Laterality: Right;    EPIDURAL STEROID INJECTION N/A 1/16/2019    Procedure: Injection, Steroid, Epidural Cervical;  Surgeon: Andrew Sinclair Jr., MD;  Location: [facility] ENDO;  Service: Pain Management;  Laterality: N/A;  Cervical Epidural Steroid Injection C7-T1    35462    Arrive @ 1240    EPIDURAL STEROID INJECTION N/A 2/20/2019    Procedure: Injection, Steroid, Epidural;  Surgeon: Andrew Sinclair Jr., MD;  Location: [facility] ENDO;  Service: Pain Management;  Laterality: N/A;  Lumbar Epidural Steroid Injection L4-5    48481    Arrive @ 1215 (requests latest time)    FIXATION OF SYNDESMOSIS OF ANKLE Right 7/26/2024    Procedure: FIXATION, SYNDESMOSIS, ANKLE;  Surgeon: Moe Liz MD;  Location: [facility] OR [address] [place] [address];  Service: Orthopedics;  Laterality: Right;    INJECTION, SPINE, LUMBOSACRAL, TRANSFORAMINAL APPROACH Bilateral 6/14/2024    Procedure: Bilateral L5 Transforaminal Epidural Steroid Injections;  Surgeon: Andrew Sinclair Jr., MD;  Location: [facility] PAIN [facility]EMENT;  Service: Pain Management;  Laterality: Bilateral;  @1300(given)  ASA last 6/8  Check BG  MD Sign.    IRRIGATION AND DEBRIDEMENT Right 9/6/2024    Procedure: IRRIGATION AND DEBRIDEMENT;  Surgeon: Moe Liz MD;   [facility];  Service: Orthopedics;  Laterality: Right;    IRRIGATION AND DEBRIDEMENT OF LOWER EXTREMITY Right 9/9/2024    Procedure: IRRIGATION AND DEBRIDEMENT, LOWER EXTREMITY - WITH WOUND VAC CHANGE, RIGHT ANKLE;  Surgeon: Moe Liz MD;  [facility];  Service: Orthopedics;  Laterality: Right;  WITH WOUND VAC CHANGE, RIGHT ANKLE    OPEN REDUCTION AND INTERNAL FIXATION (ORIF) OF FRACTURE OF DISTAL RADIUS Left 7/19/2024    Procedure: ORIF, FRACTURE, RADIUS, DISTAL - LEFT;  Surgeon: Moe Liz MD;  Location: [facility] OR [address] [place] [address];  Service: Orthopedics;  Laterality: Left;  LEFT, SYNTHES, C ARM    OPEN REDUCTION AND INTERNAL FIXATION (ORIF) OF INJURY OF ANKLE Right 7/26/2024    Procedure: ORIF, ANKLE;  Surgeon: Moe Liz MD;  Location: [facility] OR [address] [place] [address];  Service: Orthopedics;  Laterality: Right;    REMOVAL OF EXTERNAL FIXATION DEVICE Right 7/26/2024    Procedure: REMOVAL, EXTERNAL FIXATION DEVICE;  Surgeon: Moe Liz MD;  Location: [facility] OR [address] [place] [address];  Service: Orthopedics;  Laterality: Right;    REPLACEMENT OF WOUND VACUUM-ASSISTED CLOSURE DEVICE Right 9/17/2024    Procedure: REPLACEMENT, WOUND VAC;  Surgeon: Moe Liz MD;  Location: [facility] OR [address] [place] [address];  Service: Orthopedics;  Laterality: Right;  wound vac dressing exchange       Time Tracking:     OT Date of Treatment: 09/22/24  OT Start Time: 1227  OT Stop Time: 1236  OT Total Time (min): 9 min    Billable Minutes:Re-eval 9 9/22/2024

## 2024-09-22 NOTE — ASSESSMENT & PLAN NOTE
Improved with oral replacement. Potassium low at 3.4 on 9/21 and will replace with oral potassium. Patient's most recent potassium results are listed below.   Recent Labs     09/20/24  0434 09/21/24  0332 09/22/24  0215   K 4.0 3.4* 3.7       Plan  - Replete potassium per protocol  - Monitor potassium Daily

## 2024-09-22 NOTE — SUBJECTIVE & OBJECTIVE
"Interval HPI:   Overnight events: No acute events overnight. Patient in room 542/542 A. Blood glucose stable. BG at goal on current insulin regimen (SSI, prandial, and basal insulin ). Steroid use- None. 2 Days Post-Op  Renal function- Normal   Vasopressors-  None     Of note, BG error in POCT BG, patient BG was not 32, poor sample, re-check 115 mg/dL. Decreasing insulin requirements noted.     Endocrine will continue to follow and manage insulin orders inpatient.         Diet NPO Except for: Sips with Medication     Eating:   NPO  Nausea: No  Hypoglycemia and intervention: No  Fever: No  TPN and/or TF: No      BP (!) 152/86 (BP Location: Right arm, Patient Position: Lying)   Pulse 91   Temp 98.4 °F (36.9 °C) (Oral)   Resp 17   Ht 5' 5" (1.651 m)   Wt 93 kg (205 lb 0.4 oz)   SpO2 (!) 92%   BMI 34.12 kg/m²     Labs Reviewed and Include    Recent Labs   Lab 09/22/24  0215   *   CALCIUM 7.9*   ALBUMIN 1.6*   PROT 5.9*      K 3.7   CO2 25      BUN 20   CREATININE 1.0   ALKPHOS 164*   ALT <5*   AST 17   BILITOT 0.3     Lab Results   Component Value Date    WBC 7.71 09/22/2024    HGB 7.6 (L) 09/22/2024    HCT 24.8 (L) 09/22/2024    MCV 88 09/22/2024     (H) 09/22/2024     No results for input(s): "TSH", "FREET4" in the last 168 hours.  Lab Results   Component Value Date    HGBA1C 8.5 (H) 09/06/2024       Nutritional status:   Body mass index is 34.12 kg/m².  Lab Results   Component Value Date    ALBUMIN 1.6 (L) 09/22/2024    ALBUMIN 1.7 (L) 09/21/2024    ALBUMIN 1.7 (L) 09/20/2024     Lab Results   Component Value Date    PREALBUMIN 3 (L) 09/06/2024    PREALBUMIN 13 (L) 07/18/2024       Estimated Creatinine Clearance: 79.2 mL/min (based on SCr of 1 mg/dL).    Accu-Checks  Recent Labs     09/20/24  1408 09/20/24  1614 09/20/24 2000 09/21/24  0721 09/21/24  1038 09/21/24  1607 09/21/24  2131 09/21/24  2219 09/22/24  0731 09/22/24  0732   POCTGLUCOSE 193* 177* 152* 76 121* 143* 72 114* 37* " 115*       Current Medications and/or Treatments Impacting Glycemic Control  Immunotherapy:    Immunosuppressants       None          Steroids:   Hormones (From admission, onward)      None          Pressors:    Autonomic Drugs (From admission, onward)      None          Hyperglycemia/Diabetes Medications:   Antihyperglycemics (From admission, onward)      Start     Stop Route Frequency Ordered    09/22/24 0900  insulin glargine U-100 (Lantus) pen 22 Units         -- SubQ Daily 09/22/24 0842    09/20/24 1524  insulin aspart U-100 pen 0-10 Units         -- SubQ Before meals & nightly PRN 09/20/24 1424    09/20/24 1130  insulin aspart U-100 pen 2-8 Units         -- SubQ 3 times daily with meals 09/20/24 0844

## 2024-09-22 NOTE — ASSESSMENT & PLAN NOTE
Patient's FSGs are improving on current hypoglycemics. Endocrine consulted and managing patient's diabetes in hospital and appreciate assistance. Patient on insulin pump at [facility] but not in hospital and on basal/prandial insulin in hospital as per Endocrine. Appreciate Endocrine assistance on this case.   Last A1c reviewed-   Lab Results   Component Value Date    LABA1C 10.8 (H) 08/15/2016    HGBA1C 8.5 (H) 09/06/2024     Most recent fingerstick glucose reviewed-   Recent Labs   Lab 09/22/24  0731 09/22/24  0732 09/22/24  1057 09/22/24  1553   POCTGLUCOSE 37* 115* 183* 107       Current correctional scale  Low  Maintain anti-hyperglycemic dose as follows-   Antihyperglycemics (From admission, onward)    Start     Stop Route Frequency Ordered    09/22/24 0900  insulin glargine U-100 (Lantus) pen 22 Units         -- SubQ Daily 09/22/24 0842    09/20/24 1524  insulin aspart U-100 pen 0-10 Units         -- SubQ Before meals & nightly PRN 09/20/24 1424    09/20/24 1130  insulin aspart U-100 pen 2-8 Units         -- SubQ 3 times daily with meals 09/20/24 0844         - Endocrine consulted:  - At this time, recommend that the patient remain off of his pump since he cannot be controlled in the Auto mode. Patient does not interact with pump frequently and relies on the auto mode algorithm.   - Insulin dosing as per Endocrine recommendations.   - Hold oral antihyperglycemics during hospitalization   - Monitor blood sugars with meals and at bedtime in hospital.  - Target blood sugars 140-180 in hospital.  - Diabetic diet.

## 2024-09-22 NOTE — PT/OT/SLP PROGRESS
Physical Therapy    Update    Cortes Schroeder   MRN: [id number]    Attempted to see patient for PT re-assessment at 1327, he was resting in bed and refusing mobility due to fatigue (refused yesterday as well). Mr. Kolb asked if PT could check tomorrow and bring a wheelchair so he could get fresh air outside but didn't feel well enough to try today. Will continue to follow along for PT, no billable units today.    Pj Alberts, PT  9/22/2024

## 2024-09-22 NOTE — NURSING
Pt blood sugar, 72 at 2131. Pt provided with food and drink. Reassessed blood sugar 114 at 2219. Pt does not c/o chiils, dizziness, lightheadedness, NAD. Call light within reach. Will continue plan of care.

## 2024-09-23 ENCOUNTER — ANESTHESIA (OUTPATIENT)
Dept: SURGERY | Facility: HOSPITAL | Age: 64
DRG: 856 | End: 2024-09-23
Payer: COMMERCIAL

## 2024-09-23 LAB
ALBUMIN SERPL BCP-MCNC: 1.6 G/DL (ref 3.5–5.2)
ALP SERPL-CCNC: 162 U/L (ref 55–135)
ALT SERPL W/O P-5'-P-CCNC: 5 U/L (ref 10–44)
ANION GAP SERPL CALC-SCNC: 11 MMOL/L (ref 8–16)
AST SERPL-CCNC: 17 U/L (ref 10–40)
BILIRUB SERPL-MCNC: 0.3 MG/DL (ref 0.1–1)
BUN SERPL-MCNC: 19 MG/DL (ref 8–23)
CALCIUM SERPL-MCNC: 7.9 MG/DL (ref 8.7–10.5)
CHLORIDE SERPL-SCNC: 106 MMOL/L (ref 95–110)
CO2 SERPL-SCNC: 23 MMOL/L (ref 23–29)
CREAT SERPL-MCNC: 0.9 MG/DL (ref 0.5–1.4)
ERYTHROCYTE [DISTWIDTH] IN BLOOD BY AUTOMATED COUNT: 16.7 % (ref 11.5–14.5)
EST. GFR  (NO RACE VARIABLE): >60 ML/MIN/1.73 M^2
GLUCOSE SERPL-MCNC: 124 MG/DL (ref 70–110)
HCT VFR BLD AUTO: 26.6 % (ref 40–54)
HGB BLD-MCNC: 8.4 G/DL (ref 14–18)
MCH RBC QN AUTO: 27.8 PG (ref 27–31)
MCHC RBC AUTO-ENTMCNC: 31.6 G/DL (ref 32–36)
MCV RBC AUTO: 88 FL (ref 82–98)
PLATELET # BLD AUTO: 537 K/UL (ref 150–450)
PMV BLD AUTO: 9.7 FL (ref 9.2–12.9)
POCT GLUCOSE: 152 MG/DL (ref 70–110)
POCT GLUCOSE: 164 MG/DL (ref 70–110)
POCT GLUCOSE: 175 MG/DL (ref 70–110)
POCT GLUCOSE: 181 MG/DL (ref 70–110)
POTASSIUM SERPL-SCNC: 3.7 MMOL/L (ref 3.5–5.1)
PROT SERPL-MCNC: 6 G/DL (ref 6–8.4)
RBC # BLD AUTO: 3.02 M/UL (ref 4.6–6.2)
SODIUM SERPL-SCNC: 140 MMOL/L (ref 136–145)
WBC # BLD AUTO: 9.67 K/UL (ref 3.9–12.7)

## 2024-09-23 PROCEDURE — 71000016 HC POSTOP RECOV ADDL HR: Performed by: ORTHOPAEDIC SURGERY

## 2024-09-23 PROCEDURE — 25000003 PHARM REV CODE 250: Performed by: STUDENT IN AN ORGANIZED HEALTH CARE EDUCATION/TRAINING PROGRAM

## 2024-09-23 PROCEDURE — 85027 COMPLETE CBC AUTOMATED: CPT | Performed by: HOSPITALIST

## 2024-09-23 PROCEDURE — 64447 NJX AA&/STRD FEMORAL NRV IMG: CPT

## 2024-09-23 PROCEDURE — 0JBQ0ZZ EXCISION OF RIGHT FOOT SUBCUTANEOUS TISSUE AND FASCIA, OPEN APPROACH: ICD-10-PCS | Performed by: ORTHOPAEDIC SURGERY

## 2024-09-23 PROCEDURE — 63600175 PHARM REV CODE 636 W HCPCS

## 2024-09-23 PROCEDURE — 27201423 OPTIME MED/SURG SUP & DEVICES STERILE SUPPLY: Performed by: ORTHOPAEDIC SURGERY

## 2024-09-23 PROCEDURE — 20680 REMOVAL OF IMPLANT DEEP: CPT | Mod: 58,,, | Performed by: ORTHOPAEDIC SURGERY

## 2024-09-23 PROCEDURE — 36000706: Performed by: ORTHOPAEDIC SURGERY

## 2024-09-23 PROCEDURE — 82962 GLUCOSE BLOOD TEST: CPT | Performed by: ORTHOPAEDIC SURGERY

## 2024-09-23 PROCEDURE — 36000707: Performed by: ORTHOPAEDIC SURGERY

## 2024-09-23 PROCEDURE — 36415 COLL VENOUS BLD VENIPUNCTURE: CPT | Performed by: HOSPITALIST

## 2024-09-23 PROCEDURE — 97605 NEG PRS WND THER DME<=50SQCM: CPT | Mod: ,,, | Performed by: ORTHOPAEDIC SURGERY

## 2024-09-23 PROCEDURE — 63600175 PHARM REV CODE 636 W HCPCS: Mod: JZ,JG | Performed by: STUDENT IN AN ORGANIZED HEALTH CARE EDUCATION/TRAINING PROGRAM

## 2024-09-23 PROCEDURE — 63600175 PHARM REV CODE 636 W HCPCS: Performed by: NURSE ANESTHETIST, CERTIFIED REGISTERED

## 2024-09-23 PROCEDURE — 64445 NJX AA&/STRD SCIATIC NRV IMG: CPT

## 2024-09-23 PROCEDURE — 80053 COMPREHEN METABOLIC PANEL: CPT | Performed by: HOSPITALIST

## 2024-09-23 PROCEDURE — 63600175 PHARM REV CODE 636 W HCPCS: Performed by: STUDENT IN AN ORGANIZED HEALTH CARE EDUCATION/TRAINING PROGRAM

## 2024-09-23 PROCEDURE — 99232 SBSQ HOSP IP/OBS MODERATE 35: CPT | Mod: ,,,

## 2024-09-23 PROCEDURE — 0SPF04Z REMOVAL OF INTERNAL FIXATION DEVICE FROM RIGHT ANKLE JOINT, OPEN APPROACH: ICD-10-PCS | Performed by: ORTHOPAEDIC SURGERY

## 2024-09-23 PROCEDURE — 25000003 PHARM REV CODE 250: Performed by: INTERNAL MEDICINE

## 2024-09-23 PROCEDURE — 21400001 HC TELEMETRY ROOM

## 2024-09-23 PROCEDURE — 99499 UNLISTED E&M SERVICE: CPT | Mod: ,,, | Performed by: STUDENT IN AN ORGANIZED HEALTH CARE EDUCATION/TRAINING PROGRAM

## 2024-09-23 PROCEDURE — 27620 EXPLORE/TREAT ANKLE JOINT: CPT | Mod: 58,RT,, | Performed by: ORTHOPAEDIC SURGERY

## 2024-09-23 PROCEDURE — 71000033 HC RECOVERY, INTIAL HOUR: Performed by: ORTHOPAEDIC SURGERY

## 2024-09-23 PROCEDURE — 37000008 HC ANESTHESIA 1ST 15 MINUTES: Performed by: ORTHOPAEDIC SURGERY

## 2024-09-23 PROCEDURE — 71000015 HC POSTOP RECOV 1ST HR: Performed by: ORTHOPAEDIC SURGERY

## 2024-09-23 PROCEDURE — 37000009 HC ANESTHESIA EA ADD 15 MINS: Performed by: ORTHOPAEDIC SURGERY

## 2024-09-23 PROCEDURE — 63600175 PHARM REV CODE 636 W HCPCS: Performed by: ORTHOPAEDIC SURGERY

## 2024-09-23 PROCEDURE — 2W0SX6Z CHANGE PRESSURE DRESSING ON RIGHT FOOT: ICD-10-PCS | Performed by: ORTHOPAEDIC SURGERY

## 2024-09-23 RX ORDER — GLUCAGON 1 MG
1 KIT INJECTION
Status: DISCONTINUED | OUTPATIENT
Start: 2024-09-23 | End: 2024-10-04 | Stop reason: HOSPADM

## 2024-09-23 RX ORDER — PROCHLORPERAZINE EDISYLATE 5 MG/ML
5 INJECTION INTRAMUSCULAR; INTRAVENOUS EVERY 30 MIN PRN
Status: DISCONTINUED | OUTPATIENT
Start: 2024-09-23 | End: 2024-10-04 | Stop reason: HOSPADM

## 2024-09-23 RX ORDER — BUPIVACAINE HYDROCHLORIDE 5 MG/ML
INJECTION, SOLUTION EPIDURAL; INTRACAUDAL
Status: COMPLETED | OUTPATIENT
Start: 2024-09-23 | End: 2024-09-23

## 2024-09-23 RX ORDER — FENTANYL CITRATE 50 UG/ML
25 INJECTION, SOLUTION INTRAMUSCULAR; INTRAVENOUS EVERY 5 MIN PRN
Status: DISCONTINUED | OUTPATIENT
Start: 2024-09-23 | End: 2024-09-27

## 2024-09-23 RX ORDER — PROPOFOL 10 MG/ML
VIAL (ML) INTRAVENOUS CONTINUOUS PRN
Status: DISCONTINUED | OUTPATIENT
Start: 2024-09-23 | End: 2024-09-23

## 2024-09-23 RX ORDER — VANCOMYCIN HYDROCHLORIDE 1 G/20ML
INJECTION, POWDER, LYOPHILIZED, FOR SOLUTION INTRAVENOUS
Status: DISCONTINUED | OUTPATIENT
Start: 2024-09-23 | End: 2024-09-23 | Stop reason: HOSPADM

## 2024-09-23 RX ORDER — FENTANYL CITRATE 50 UG/ML
25-200 INJECTION, SOLUTION INTRAMUSCULAR; INTRAVENOUS
Status: DISCONTINUED | OUTPATIENT
Start: 2024-09-23 | End: 2024-09-23

## 2024-09-23 RX ORDER — MIDAZOLAM HYDROCHLORIDE 1 MG/ML
.5-4 INJECTION, SOLUTION INTRAMUSCULAR; INTRAVENOUS
Status: DISCONTINUED | OUTPATIENT
Start: 2024-09-23 | End: 2024-09-23

## 2024-09-23 RX ORDER — MEPERIDINE HYDROCHLORIDE 50 MG/ML
12.5 INJECTION INTRAMUSCULAR; INTRAVENOUS; SUBCUTANEOUS ONCE AS NEEDED
Status: DISCONTINUED | OUTPATIENT
Start: 2024-09-23 | End: 2024-09-24

## 2024-09-23 RX ADMIN — OXACILLIN 12 G: 2 INJECTION, POWDER, FOR SOLUTION INTRAMUSCULAR; INTRAVENOUS at 06:09

## 2024-09-23 RX ADMIN — POLYETHYLENE GLYCOL 3350 17 G: 17 POWDER, FOR SOLUTION ORAL at 01:09

## 2024-09-23 RX ADMIN — AMLODIPINE BESYLATE 10 MG: 10 TABLET ORAL at 01:09

## 2024-09-23 RX ADMIN — PANTOPRAZOLE SODIUM 40 MG: 40 TABLET, DELAYED RELEASE ORAL at 01:09

## 2024-09-23 RX ADMIN — BUPIVACAINE HYDROCHLORIDE 30 ML: 5 INJECTION, SOLUTION EPIDURAL; INTRACAUDAL; PERINEURAL at 08:09

## 2024-09-23 RX ADMIN — POLYETHYLENE GLYCOL 3350 17 G: 17 POWDER, FOR SOLUTION ORAL at 09:09

## 2024-09-23 RX ADMIN — MIDAZOLAM 2 MG: 1 INJECTION INTRAMUSCULAR; INTRAVENOUS at 08:09

## 2024-09-23 RX ADMIN — CHOLECALCIFEROL TAB 25 MCG (1000 UNIT) 1000 UNITS: 25 TAB at 04:09

## 2024-09-23 RX ADMIN — HYDRALAZINE HYDROCHLORIDE 25 MG: 25 TABLET ORAL at 01:09

## 2024-09-23 RX ADMIN — NORTRIPTYLINE HYDROCHLORIDE 50 MG: 25 CAPSULE ORAL at 09:09

## 2024-09-23 RX ADMIN — INSULIN ASPART 2 UNITS: 100 INJECTION, SOLUTION INTRAVENOUS; SUBCUTANEOUS at 04:09

## 2024-09-23 RX ADMIN — ACETAMINOPHEN 1000 MG: 325 TABLET ORAL at 06:09

## 2024-09-23 RX ADMIN — ACETAMINOPHEN 1000 MG: 325 TABLET ORAL at 04:09

## 2024-09-23 RX ADMIN — INSULIN GLARGINE 22 UNITS: 100 INJECTION, SOLUTION SUBCUTANEOUS at 01:09

## 2024-09-23 RX ADMIN — PROPOFOL 50 MCG/KG/MIN: 10 INJECTION, EMULSION INTRAVENOUS at 10:09

## 2024-09-23 RX ADMIN — BUPIVACAINE HYDROCHLORIDE 20 ML: 5 INJECTION, SOLUTION EPIDURAL; INTRACAUDAL at 08:09

## 2024-09-23 RX ADMIN — SUCRALFATE 1 G: 1 SUSPENSION ORAL at 05:09

## 2024-09-23 RX ADMIN — LOSARTAN POTASSIUM 50 MG: 25 TABLET, FILM COATED ORAL at 01:09

## 2024-09-23 RX ADMIN — ACETAMINOPHEN 1000 MG: 325 TABLET ORAL at 09:09

## 2024-09-23 RX ADMIN — ENOXAPARIN SODIUM 40 MG: 40 INJECTION SUBCUTANEOUS at 04:09

## 2024-09-23 RX ADMIN — HYDRALAZINE HYDROCHLORIDE 25 MG: 25 TABLET ORAL at 07:09

## 2024-09-23 RX ADMIN — FENTANYL CITRATE 50 MCG: 50 INJECTION INTRAMUSCULAR; INTRAVENOUS at 08:09

## 2024-09-23 NOTE — ASSESSMENT & PLAN NOTE
Cortes Schroeder is a 64 y.o. male with PMH of DM, HTN  and right trimalleolar ankle fracture status post ex fix on 07/18 with subsequent definitive fixation on 07/26 admitted with right ankle wound dehiscence in the setting of uncontrolled diabetes.      He is s/p I&D of R ankle 09/06. Repeat I&D R medial ankle 09/09. 9/17 angiography and medial ankle wound vac exchange.   To OR 9/20 and 9/23 for repeat I&D and vac exchange.   Propeller flap with plastics tentatively 9/25.     Diet: NPO since midnight   Pain control: multimodal regimen  PT/OT:  NWB RLE   DVT PPx: Lvx   Abx:  oxacillin continuous; ID following   Cultures:  ankle cx staph +    Dispo: I&D and vac exchange today, possible OR with plastics Wednesday 9/25

## 2024-09-23 NOTE — PROGRESS NOTES
Pre-procedure complete. Awaiting ortho for surgical consent prior to regional block start. MD notified again via secure chat.

## 2024-09-23 NOTE — PT/OT/SLP PROGRESS
Occupational Therapy      Patient Name:  Cortes Schroeder   MRN:  [id number]    Patient not seen today secondary to off the floor for I&D/wound vac exchange. OT to attempt at next available date.     9/23/2024

## 2024-09-23 NOTE — ANESTHESIA PREPROCEDURE EVALUATION
[facility]  Anesthesia Pre-Operative Evaluation         Patient Name: Cortes Schroeder  YOB: 1960  MRN: [id number]    SUBJECTIVE:     Pre-operative Evaluation for Procedure(s) (LRB):  REPLACEMENT, WOUND VAC; white & black sponge (Right)      09/22/2024    Cortes Schroeder is a 64 y.o. male with a PMHx significant for 64-year-old man with type 2 diabetes on insulin pump, hypertension, morbid obesity, , GERD, HLD, chronic pain syndrome presents to the emergency department after multiple falls with traumatic injury after syncope.     *paper consent signed by patient *     Previous Airway:     Intubated:  Postinduction    Mask Ventilation:  Easy with oral airway    Method of Intubation:  Video laryngoscopy    Blade:  Bravo 3    Laryngeal View Grade: Grade I - full view of cords      Difficult Airway Encountered?: No      Airway Device Size:  7.5    Style/Cuff Inflation:  Cuffed (inflated to minimal occlusive pressure)    Tube secured:  24    Secured at:  The lips    Placement Verified By:  Capnometry    LDA:        Peripheral IV - Single Lumen 09/10/24 1552 20 G 1 3/4 in Yes Anterior;Left Upper Arm (Active)   Site Assessment Clean;Dry;Intact;No redness;No swelling 09/18/24 1600   Extremity Assessment Distal to IV No warmth;No swelling;No redness;No abnormal discoloration 09/18/24 0800   Line Status Infusing 09/18/24 1600   Dressing Status Clean;Dry;Intact 09/18/24 1600   Dressing Intervention Integrity maintained 09/19/24 0400   Dressing Change Due 09/19/24 09/18/24 0800   Site Change Due 09/19/24 09/18/24 0800   Reason Not Rotated Not due 09/18/24 0800   Number of days: 8       Drips: None documented.        Patient Active Problem List   Diagnosis    Hyperlipidemia    Anxiety    Chronic bilateral low back pain without sciatica    Neuropathy    Uncontrolled type 2 diabetes mellitus with diabetic polyneuropathy, with long-term current use of insulin    NPDR (nonproliferative diabetic retinopathy)     Insomnia    Metabolic acidosis    Gastroesophageal reflux disease without esophagitis    Chronic pain syndrome    Hypokalemia    Diabetic nephropathy associated with type 2 diabetes mellitus    Right sided weakness    Cervical syndrome    Chronic neck pain    Muscle weakness    Impaired motor control    Decreased range of motion (ROM) of shoulder    Cervical spondylosis without myelopathy    Neuroforaminal stenosis of cervical spine    Right cervical radiculopathy    Cervical radiculopathy    DDD (degenerative disc disease), lumbar    Insulin pump status    Class 1 obesity due to excess calories with serious comorbidity and body mass index (BMI) of 34.0 to 34.9 in adult    High risk homosexual behavior    Lumbar radiculopathy    Lumbar spondylosis    Left lumbar radiculitis    Closed trimalleolar fracture of right ankle    Hypertension, essential    Polyneuropathy due to type 2 diabetes mellitus    Uncontrolled type 2 diabetes mellitus with hyperglycemia    Abnormal thyroid blood test    Surgical wound breakdown    Surgical site infection    MSSA bacteremia    On pre-exposure prophylaxis for HIV    Thrombocytosis    Acute blood loss anemia    PAD (peripheral artery disease)    Airway malacia       Review of patient's allergies indicates:   Allergen Reactions    Invokana [canagliflozin] Anaphylaxis    Percocet [oxycodone-acetaminophen] Nausea Only and Hallucinations    Biaxin [clarithromycin]     Hydrocodone Other (See Comments)     Dizzy/nausea/hallucinations    Sulfa (sulfonamide antibiotics) Nausea Only and Rash       Current Inpatient Medications:   acetaminophen  1,000 mg Oral Q8H    amLODIPine  10 mg Oral Daily    enoxaparin  40 mg Subcutaneous Daily    insulin aspart U-100  2-8 Units Subcutaneous TIDWM    insulin glargine U-100  22 Units Subcutaneous Daily    losartan  50 mg Oral Daily    nortriptyline  50 mg Oral Nightly    oxacillin 12 g in  mL CONTINUOUS INFUSION  12 g Intravenous Q24H     pantoprazole  40 mg Oral Daily    polyethylene glycol  17 g Oral BID    senna  8.6 mg Oral BID    sucralfate  1 g Oral Q6H    vitamin D  1,000 Units Oral Daily       Past Surgical History:   Procedure Laterality Date    ANGIOGRAPHY OF LOWER EXTREMITY Right 9/17/2024    Procedure: Angiogram Extremity Unilateral;  Surgeon: GINA Morton II, MD;  Location: [facility] OR [address] [place] [address];  Service: Vascular;  Laterality: Right;  Fluro time 19.5 min  mGy 260.49    APPLICATION OF WOUND VACUUM-ASSISTED CLOSURE DEVICE Right 9/6/2024    Procedure: APPLICATION, WOUND VAC;  Surgeon: Moe Liz MD;  Location: [facility] OR [address] [place] [address];  Service: Orthopedics;  Laterality: Right;    APPLICATION, EXTERNAL FIXATION DEVICE, FOR ANKLE FRACTURE Right 7/18/2024    Procedure: APPLICATION, EXTERNAL FIXATION DEVICE, FOR ANKLE FRACTURE;  Surgeon: Moe Liz MD;  Location: [facility] OR [address] [place] [address];  Service: Orthopedics;  Laterality: Right;    ARTHROTOMY OF ANKLE Right 9/9/2024    Procedure: ARTHROTOMY, ANKLE;  Surgeon: Moe Liz MD;  Location: [facility] OR [address] [place] [address];  Service: Orthopedics;  Laterality: Right;    BACK SURGERY  2003    Lumbar Spine    CATARACT EXTRACTION      CLOSED REDUCTION, FRACTURE, ANKLE, TRIMALLEOLAR Right 7/18/2024    Procedure: CLOSED REDUCTION, FRACTURE, ANKLE, TRIMALLEOLAR;  Surgeon: Moe Liz MD;  Location: [facility] OR [address] [place] [address];  Service: Orthopedics;  Laterality: Right;    EPIDURAL STEROID INJECTION N/A 1/16/2019    Procedure: Injection, Steroid, Epidural Cervical;  Surgeon: Andrew Sinclair Jr., MD;  Location: [facility] ENDO;  Service: Pain Management;  Laterality: N/A;  Cervical Epidural Steroid Injection C7-T1    66816    Arrive @ 1240    EPIDURAL STEROID INJECTION N/A 2/20/2019    Procedure: Injection, Steroid, Epidural;  Surgeon: Andrew Sinclair Jr., MD;  Location: [facility] ENDO;  Service: Pain Management;  Laterality: N/A;  Lumbar Epidural Steroid Injection L4-5    43472    Arrive @ 1215 (requests latest time)     FIXATION OF SYNDESMOSIS OF ANKLE Right 7/26/2024    Procedure: FIXATION, SYNDESMOSIS, ANKLE;  Surgeon: Moe Liz MD;  [facility];  Service: Orthopedics;  Laterality: Right;    INJECTION, SPINE, LUMBOSACRAL, TRANSFORAMINAL APPROACH Bilateral 6/14/2024    Procedure: Bilateral L5 Transforaminal Epidural Steroid Injections;  Surgeon: Andrew Sinclair Jr., MD;  Location: [facility] PAIN MANAGEMENT;  Service: Pain Management;  Laterality: Bilateral;  @1300(given)  ASA last 6/8  Check BG  MD Sign.    IRRIGATION AND DEBRIDEMENT Right 9/6/2024    Procedure: IRRIGATION AND DEBRIDEMENT;  Surgeon: Moe Liz MD;  [facility];  Service: Orthopedics;  Laterality: Right;    IRRIGATION AND DEBRIDEMENT OF LOWER EXTREMITY Right 9/9/2024    Procedure: IRRIGATION AND DEBRIDEMENT, LOWER EXTREMITY - WITH WOUND VAC CHANGE, RIGHT ANKLE;  Surgeon: Moe Liz MD;  [facility];  Service: Orthopedics;  Laterality: Right;  WITH WOUND VAC CHANGE, RIGHT ANKLE    OPEN REDUCTION AND INTERNAL FIXATION (ORIF) OF FRACTURE OF DISTAL RADIUS Left 7/19/2024    Procedure: ORIF, FRACTURE, RADIUS, DISTAL - LEFT;  Surgeon: Moe Liz MD;  [facility];  Service: Orthopedics;  Laterality: Left;  LEFT, SYNTHES, C ARM    OPEN REDUCTION AND INTERNAL FIXATION (ORIF) OF INJURY OF ANKLE Right 7/26/2024    Procedure: ORIF, ANKLE;  Surgeon: Moe Liz MD;  [facility];  Service: Orthopedics;  Laterality: Right;    REMOVAL OF EXTERNAL FIXATION DEVICE Right 7/26/2024    Procedure: REMOVAL, EXTERNAL FIXATION DEVICE;  Surgeon: Moe Liz MD;  [facility];  Service: Orthopedics;  Laterality: Right;    REPLACEMENT OF WOUND VACUUM-ASSISTED CLOSURE DEVICE Right 9/17/2024    Procedure: REPLACEMENT, WOUND VAC;  Surgeon: Moe Liz MD;  Location: [facility] OR 2ND FLR;  Service: Orthopedics;  Laterality: Right;  wound vac dressing exchange        Social History     Substance and Sexual Activity   Drug Use No     Tobacco Use: Low Risk  (9/9/2024)    Patient History     Smoking Tobacco Use: Never     Smokeless Tobacco Use: Never     Passive Exposure: Not on file     Alcohol Use: Not At Risk (9/8/2024)    AUDIT-C     Frequency of Alcohol Consumption: Never     Average Number of Drinks: Patient does not drink     Frequency of Binge Drinking: Never       OBJECTIVE:     Vital Signs Range (Last 24H):  Temp:  [36.5 °C (97.7 °F)-37 °C (98.6 °F)]   Pulse:  []   Resp:  [16-18]   BP: (143-172)/(64-86)   SpO2:  [92 %-96 %]       Significant Labs    Heme Profile  Lab Results   Component Value Date    WBC 7.71 09/22/2024    HGB 7.6 (L) 09/22/2024    HCT 24.8 (L) 09/22/2024     (H) 09/22/2024       Coagulation Studies  Lab Results   Component Value Date    LABPROT 11.2 09/06/2024    INR 1.0 09/06/2024    APTT 29.9 09/06/2024       Metabolic Profile  Lab Results   Component Value Date     09/22/2024    K 3.7 09/22/2024     09/22/2024    CO2 25 09/22/2024    BUN 20 09/22/2024    CREATININE 1.0 09/22/2024    MG 2.5 09/14/2024    PHOS 3.4 09/18/2024       Liver Function Tests  Lab Results   Component Value Date    AST 17 09/22/2024    ALT <5 (L) 09/22/2024    ALKPHOS 164 (H) 09/22/2024    BILITOT 0.3 09/22/2024    PROT 5.9 (L) 09/22/2024    ALBUMIN 1.6 (L) 09/22/2024       Lipid Profile  Lab Results   Component Value Date    CHOL 104 09/23/2021    HDL 38 (L) 09/23/2021    TRIG 203 (H) 09/23/2021       Endocrine Profile  Lab Results   Component Value Date    HGBA1C 8.5 (H) 09/06/2024    TSH 6.243 (H) 07/18/2024       Diagnostic Studies      EKG:   Results for orders placed or performed during the hospital encounter of 09/06/24   EKG 12-lead    Collection Time: 09/08/24  9:39 AM   Result Value Ref Range    QRS Duration 72 ms    [facility] QTC Calculation 399 ms    Narrative    Test Reason : R00.0,    Vent. Rate : 114 BPM     Atrial Rate : 114 BPM     P-R  Int : 208 ms          QRS Dur : 072 ms      QT Int : 290 ms       P-R-T Axes : 040 -11 044 degrees     QTc Int : 399 ms    Sinus tachycardia  Inferior infarct ,age undetermined  Abnormal ECG  When compared with ECG of 06-SEP-2024 02:27,  Criteria for Inferior infarct is now Present  Confirmed by Jolie Weiss MD ([id number]) on 9/8/2024 12:06:07 PM    Referred By: [email or web address]   SELF           Confirmed By:Jolie Weiss MD       TTE   Results for orders placed during the hospital encounter of 09/06/24    Echo    Interpretation Summary    Left Ventricle: The left ventricle is normal in size. Normal wall thickness. There is normal systolic function with a visually estimated ejection fraction of 65 - 70%. There is normal diastolic function.    Right Ventricle: Normal right ventricular cavity size. Wall thickness is normal. Systolic function is normal.    IVC/SVC: Normal venous pressure at 3 mmHg.    No evidence of intracardiac mass or vegetation.                  ASSESSMENT/PLAN:     Cortes Schroeder is a 64 y.o. male with  type 2 diabetes on insulin pump, hypertension, morbid obesity, , GERD, HLD,    Pre-op Assessment    I have reviewed the Patient Summary Reports.     I have reviewed the Nursing Notes. I have reviewed the NPO Status.   I have reviewed the Medications.     Review of Systems  Anesthesia Hx:  No problems with previous Anesthesia   History of prior surgery of interest to airway management or planning:            Denies Personal Hx of Anesthesia complications.                    Cardiovascular:     Hypertension                                        Renal/:  Chronic Renal Disease                Hepatic/GI:     GERD             Musculoskeletal:  Arthritis               Neurological:    Neuromuscular Disease,                                   Endocrine:  Diabetes         Obesity / BMI > 30      Physical Exam  General: Well nourished, Cooperative, Alert and Oriented  Facial hair   Airway:  Mallampati: III /  II  Mouth Opening: Small, but > 3cm  TM Distance: Normal  Tongue: Normal  Neck ROM: Normal ROM    Dental:  Periodontal disease        Anesthesia Plan  Type of Anesthesia, risks & benefits discussed:    Anesthesia Type: Gen Natural Airway, Gen Supraglottic Airway, Regional, Gen ETT  Intra-op Monitoring Plan: Standard ASA Monitors  Post Op Pain Control Plan: multimodal analgesia, IV/PO Opioids PRN and peripheral nerve block  Induction:  IV  Airway Plan: , Post-Induction  Informed Consent: Informed consent signed with the Patient and all parties understand the risks and agree with anesthesia plan.  All questions answered. Patient consented to blood products? Yes  ASA Score: 3  Day of Surgery Review of History & Physical: H&P Update referred to the surgeon/provider.    Ready For Surgery From Anesthesia Perspective.     .

## 2024-09-23 NOTE — NURSING TRANSFER
Nursing Transfer Note      9/23/2024   1:25 PM    Nurse giving handoff:Batsheva ZAMORA RN  Nurse receiving handoff:[place] PRETTY    Reason patient is being transferred: meets criteria    Transfer To: 542    Transfer via bed    Transfer with cardiac monitoring    Transported by transport    Transfer Vital Signs:  Blood Pressure:176/86  Heart Rate:92  O2:98  Temperature:97.7  Respirations:25    Telemetry: Rhythm SR  Order for Tele Monitor? Yes    Additional Lines: wound vac    4eyes on Skin: yes    Medicines sent: oxacillin gtt    Any special needs or follow-up needed: none    Patient belongings transferred with patient: No    Chart send with patient: Yes    Notified: roommate    Patient reassessed at: 9/23

## 2024-09-23 NOTE — PLAN OF CARE
Pt IV flushed. Pt does not c/o pain or discomfort. R ankle drsesing CDI, wound vac in place @ 125. Oxacillin gtt continued. , prn hydralazine administered.

## 2024-09-23 NOTE — PROGRESS NOTES
Dr. Galvan w/ ortho notified pt needs surgical consent prior to block. Pt on way to [facility] for wound vac exchange.

## 2024-09-23 NOTE — ANESTHESIA PROCEDURE NOTES
Right Popliteal SS    Patient location during procedure: pre-op   Block not for primary anesthetic.  Reason for block: at surgeon's request and post-op pain management   Post-op Pain Location: Right leg pain   Start time: 9/23/2024 8:48 AM  Timeout: 9/23/2024 8:47 AM   End time: 9/23/2024 8:55 AM    Staffing  Authorizing Provider: Zayra Tony MD  Performing Provider: Bernarda Caro MD    Staffing  Performed by: Bernarda Caro MD  Authorized by: Zayra Tony MD    Preanesthetic Checklist  Completed: patient identified, IV checked, site marked, risks and benefits discussed, surgical consent, monitors and equipment checked, pre-op evaluation and timeout performed  Peripheral Block  Patient position: supine  Prep: ChloraPrep  Patient monitoring: heart rate, cardiac monitor, continuous pulse ox, continuous capnometry and frequent blood pressure checks  Block type: popliteal  Laterality: right  Injection technique: single shot  Needle  Needle type: Echogenic   Needle gauge: 20 G  Needle length: 4 in  Needle localization: anatomical landmarks and ultrasound guidance   -ultrasound image captured on disc.  Assessment  Injection assessment: negative aspiration, negative parasthesia and local visualized surrounding nerve  Paresthesia pain: none  Heart rate change: no  Slow fractionated injection: yes    Medications:    Medications: bupivacaine (pf) (MARCAINE) injection 0.5% - Perineural, Right Popliteal   30 mL - 9/23/2024 8:55:00 AM    Additional Notes  VSS.  [facility] RN monitoring vitals throughout procedure.  Patient tolerated procedure well.

## 2024-09-23 NOTE — OP NOTE
OP NOTE    DOS:  09/23/2024    Preop Dx: Tissue breakdown and infection status post ORIF right trimalleolar ankle fracture status post multiple debridement and irrigation procedures    Peripheral vascular disease    Postop Dx: Tissue breakdown and infection status post ORIF right trimalleolar ankle fracture status post multiple debridement and irrigation procedures    Peripheral vascular disease    Procedure: Excisional and non excisional debridement right ankle wounds medial and lateral, skin, subcutaneous tissue and fascia, 10 sq cm    Arthrotomy with irrigation right ankle joint    Removal of hardware right medial and lateral ankle    Wound VAC placement right ankle medial and lateral, greater than 50 sq cm    Surgeon: Moe Liz M.D.    Asst:  Artur Galvan M.D    Anesthesia: Mac plus regional    EBL:  25 cc    IVF:  1000 cc crystalloid    Implants: None    Specimens: None    Findings: Anterolateral foot eschar developing.  Distal lateral wound breakdown.  Opened up majority of lateral wound and excised surrounding skin and subcutaneous tissue.  Removed all ankle hardware.  Wound vacs placed medially and laterally.    Dispo:  To PACU awake/stable       Indications for Procedure:      64-year-old male status post ORIF of right trimalleolar ankle fracture with uncontrolled diabetes and peripheral vascular disease who presented back with wound breakdown and deep infection. He has been washed out several times now. He went with vascular surgery for balloon angioplasty. We are trying to preserve his limb. I am taking him back for another washout and wound VAC change.  The risks, benefits and alternatives to surgery discussed the patient prior to going to the operating room.  Informed consent was obtained.    Procedure in Detail:    Patient was identified in preoperative holding area and the site was obvious.  Regional analgesia was administered.  Patient was wheeled to the operating room and placed on the  operating table in supine position.  Monitored anesthesia care was induced and preoperative antibiotics were already running from his scheduled doses.  The right lower extremity was placed into a nonsterile tourniquet that was never raised.  I then scrubbed the entire extremity with chlorhexidine and alcohol and prepped the foot, elevated it and removed the wound vacs.  The wound itself was prepped with Betadine paint.  Time-out was undertaken to confirm patient, side, site, surgery, surgeon.  All agreed we proceeded.    On the medial side a lap sponge was used to remove any loose tissue.  A cursory irrigation was then undertaken with normal saline solution.  I then irrigated the ankle joint copiously with normal saline solution.  After thorough irrigation of the entire medial side I turned my attention laterally.      The patient had some eschar forming at the distal aspect of the mobilized and closed the lateral side.  I removed the distal staples and then kept this going about 3/4 of the way proximal.  There was no blood flow to the eschar of the distal aspect of the incision.  I excised this with a 10 blade through the skin and subcutaneous tissue layers.  Plate was underlying.  I then opened the incision the entire length of the plate leaving all of the most proximal portion of the incision closed.  I then excised skin subcutaneous tissue rim of eschar anteriorly and posteriorly.  At this point it became apparent that I would not be able to leave the hardware.  I performed a thorough irrigation of the lateral and posterior aspects of the tibia.    At this point I removed the lateral plate and all the screws to include the 2 syndesmotic screws.  It has been about 2 months since his fixation.  The bone appeared stable.  I then went and removed the posterolateral screw in the posterior malleolus.  I went around the medial side and removed the 2 medial malleolar screws.    At this point I again irrigated copiously  with normal saline solution followed by dilute Betadine and again by normal saline solution.  At this point I went to investigate the eschar in the anterolateral portion of the foot.  I stab this with an 18 gauge needle in the surrounding tissue and got punctate bleeding.  I did not get any bleeding from the eschar on the anterolateral aspect of the foot.    Another round of irrigation was undertaken and then both wounds were covered with a white sponge followed by a black sponge and wound vacs were placed medial and lateral.  Patient was then placed into a dressing.      All instrument and sponge counts were reported correct at the end of the case.  There were no complications.  The patient was awakened and taken to the recovery room in stable condition.      Plan the patient:     I will have to show the images I took intraoperatively to Plastic surgery and see if there is an option for coverage in this patient.  I am also concerned about his anterolateral foot.  Despite the balloon angioplasty seems that his peripheral vascular disease he is leading to extensive wound breakdown.  I will discuss again with the patient the very real possibility this may lead to a below-knee amputation.    Moe Liz MD

## 2024-09-23 NOTE — ANESTHESIA PROCEDURE NOTES
Right Saphenous SS    Patient location during procedure: pre-op   Block not for primary anesthetic.  Reason for block: at surgeon's request and post-op pain management   Post-op Pain Location: Right leg pain   Start time: 9/23/2024 8:56 AM  Timeout: 9/23/2024 8:55 AM   End time: 9/23/2024 8:59 AM    Staffing  Authorizing Provider: Zayra Tony MD  Performing Provider: eBrnarda Caro MD    Staffing  Performed by: Bernarda Caro MD  Authorized by: Zayra Tony MD    Preanesthetic Checklist  Completed: patient identified, IV checked, site marked, risks and benefits discussed, surgical consent, monitors and equipment checked, pre-op evaluation and timeout performed  Peripheral Block  Patient position: supine  Prep: ChloraPrep  Patient monitoring: heart rate, cardiac monitor, continuous pulse ox, continuous capnometry and frequent blood pressure checks  Block type: saphenous  Laterality: right  Injection technique: single shot  Needle  Needle type: Echogenic   Needle gauge: 20 G  Needle length: 4 in  Needle localization: anatomical landmarks and ultrasound guidance   -ultrasound image captured on disc.  Assessment  Injection assessment: negative aspiration, negative parasthesia and local visualized surrounding nerve  Paresthesia pain: none  Heart rate change: no  Slow fractionated injection: yes  Pain Tolerance: comfortable throughout block  Medications:    Medications: bupivacaine (pf) (MARCAINE) injection 0.5% - Perineural, Other   20 mL - 9/23/2024 8:59:00 AM    Additional Notes  VSS.  [facility] RN monitoring vitals throughout procedure.  Patient tolerated procedure well.

## 2024-09-23 NOTE — PROGRESS NOTES
Chief complaint:   Chief Complaint   Patient presents with   • Convey Results       Vitals:  Visit Vitals  /78 (BP Location: [facility], Patient Position: Sitting, Cuff Size: Regular)   Pulse 61   Ht 5' 8\" (1.727 m)   Wt 85.1 kg   SpO2 93%   BMI 28.52 kg/m²       HISTORY OF PRESENT ILLNESS     HPI    Other significant problems:  Patient Active Problem List    Diagnosis Date Noted   • Diabetic nephropathy associated with type 2 diabetes mellitus (CMS/HCC) 04/19/2018     Priority: Low   • Expressive language disorder 09/12/2016     Priority: Low   • Diabetes mellitus type 2, controlled, without complications (CMS/HCC) 11/17/2015     Priority: Low   • Coronary atherosclerosis of unspecified type of vessel, native or graft 08/03/2012     Priority: Low   • S/P CABG x 4 08/03/2012     Priority: Low   • Dyslipidemia      Priority: Low   • Hypothyroidism      Priority: Low   • Hypertension      Priority: Low   • Polymyalgia rheumatica (CMS/HCC)      Priority: Low   • PAD (peripheral artery disease) (CMS/HCC)      Priority: Low       PAST MEDICAL, FAMILY AND SOCIAL HISTORY     Medications:  Current Outpatient Medications   Medication   • donepezil (ARICEPT) 10 MG tablet   • pioglitazone (ACTOS) 15 MG tablet   • rosuvastatin (CRESTOR) 10 MG tablet   • amlodipine-benazepril (LOTREL) 5-10 MG per capsule   • metoPROLOL tartrate (LOPRESSOR) 25 MG tablet   • levothyroxine (SYNTHROID, LEVOTHROID) 100 MCG tablet   • hydrochlorothiazide (HYDRODIURIL) 12.5 MG tablet   • FREESTYLE LITE test strip   • LECITHIN PO   • glimepiride (AMARYL) 1 MG tablet   • ranolazine (RANEXA) 500 MG 12 hr tablet   • Zinc 50 MG CAPS   • aspirin 81 MG tablet   • Multiple Vitamins-Minerals (CENTRUM SILVER ULTRA MENS) TABS   • cilostazol (PLETAL) 50 MG tablet     No current facility-administered medications for this visit.        Allergies:  ALLERGIES:   Allergen Reactions   • Lipitor [Atorvastatin Calcium] MYALGIA   • Plavix [Clopidogrel Bisulfate]      anemia  [facility]  Orthopedics  Progress Note      Attg Note:  Patient seen and examined.  I agree with the resident's assessment and plan.  To OR for washout and wound vac exchange.    Moe Liz MD      Patient Name: Cortes Schroeder  MRN: [id number]  Admission Date: 9/6/2024  Hospital Length of Stay: 17 days  Attending Provider: Raisa Kearns MD  Primary Care Provider: Andrew Sinclair Jr., MD  Follow-up For: Procedure(s) (LRB):  REPLACEMENT, WOUND VAC; white & black sponge (Right)    Post-Operative Day:    Subjective:     Principal Problem:Surgical wound breakdown    Principal Orthopedic Problem: s/p I&D and wound vac placement on 09/06, 09/20    Interval History: No issues overnight. RLE pulses remain dopplerable. NPO for repeat I&D/vac exchange today.      Tentatively planning for OR Wednesday with plastics for soft tissue coverage.       Review of patient's allergies indicates:   Allergen Reactions    Invokana [canagliflozin] Anaphylaxis    Percocet [oxycodone-acetaminophen] Nausea Only and Hallucinations    Biaxin [clarithromycin]     Hydrocodone Other (See Comments)     Dizzy/nausea/hallucinations    Sulfa (sulfonamide antibiotics) Nausea Only and Rash       Current Facility-Administered Medications   Medication    acetaminophen tablet 1,000 mg    albuterol-ipratropium 2.5 mg-0.5 mg/3 mL nebulizer solution 3 mL    amLODIPine tablet 10 mg    calcium carbonate 200 mg calcium (500 mg) chewable tablet 1,000 mg    dextrose 10% bolus 125 mL 125 mL    dextrose 10% bolus 125 mL 125 mL    dextrose 10% bolus 250 mL 250 mL    dextrose 10% bolus 250 mL 250 mL    enoxaparin injection 40 mg    glucagon (human recombinant) injection 1 mg    glucose chewable tablet 16 g    glucose chewable tablet 24 g    hydrALAZINE tablet 25 mg    insulin aspart U-100 pen 0-10 Units    insulin aspart U-100 pen 2-8 Units    insulin glargine U-100 (Lantus) pen 22 Units    losartan tablet 50 mg    methocarbamoL tablet 500 mg           Past Medical  History/Surgeries:  Past Medical History:   Diagnosis Date   • Anemia     due to plavix   • Blood transfusion    • Bradycardia    • Chest pain    • Coronary artery disease    • Coronary atherosclerosis of unspecified type of vessel, native or graft 8/3/2012   • Diabetes mellitus, type 2 (CMS/[facility])    • Dyslipidemia    • Frequent urination at night    • GERD (gastroesophageal reflux disease)    • Gout    • Hypertension    • Hypothyroidism    • PAD (peripheral artery disease) (CMS/[facility])    • Polymyalgia rheumatica (CMS/[facility])    • S/P CABG x 4 8/3/2012   • Syncope    • Trace cataracts        Past Surgical History:   Procedure Laterality Date   • A-v cardiac pacemaker insertion  2012    medtronic    • Anesth,pacemaker insertion     • Ankle fracture surgery     • Appendectomy     • Cardiac catherization  2012     Cath - Possible PTCA   • Closed rx skull fracture      as a child   • Coronary angiogram - cv  2008    stents patent, also did iliac angios and minimal disease   • Esophagogastroduodenoscopy transoral flex w/bx single or mult  2008    esophagitis   • Ptca with stent  2007    drug eluting stent Left circ, mid LAD   • Tonsillectomy and adenoidectomy     • Vasectomy         Family History:  Family History   Problem Relation Age of Onset   • Stroke Mother    • High blood pressure Mother    • Stroke Sister    • Stroke Brother    • Heart disease Brother    • Heart disease Brother        Social History:  Social History     Tobacco Use   • Smoking status: Former Smoker     Last attempt to quit: 1964     Years since quittin.4   • Smokeless tobacco: Former User     Types: Snuff     Quit date: 1967   Substance Use Topics   • Alcohol use: No       REVIEW OF SYSTEMS     Review of Systems    PHYSICAL EXAM     Physical Exam    ASSESSMENT/PLAN     This office note has been dictated.     "morphine injection 2 mg    morphine injection 4 mg    nortriptyline capsule 50 mg    ondansetron disintegrating tablet 8 mg    oxacillin 12 g in  mL CONTINUOUS INFUSION    pantoprazole EC tablet 40 mg    polyethylene glycol packet 17 g    senna tablet 8.6 mg    sucralfate 100 mg/mL suspension 1 g    vitamin D 1000 units tablet 1,000 Units     Objective:     Vital Signs (Most Recent):  Temp: 98.1 °F (36.7 °C) (09/23/24 0402)  Pulse: 96 (09/23/24 0402)  Resp: 18 (09/23/24 0402)  BP: (!) 143/66 (09/23/24 0402)  SpO2: (!) 94 % (09/23/24 0402) Vital Signs (24h Range):  Temp:  [98.1 °F (36.7 °C)-98.7 °F (37.1 °C)] 98.1 °F (36.7 °C)  Pulse:  [] 96  Resp:  [17-18] 18  SpO2:  [92 %-96 %] 94 %  BP: (143-172)/(64-86) 143/66     Weight: 93 kg (205 lb 0.4 oz)  Height: 5' 5" (165.1 cm)  Body mass index is 34.12 kg/m².      Intake/Output Summary (Last 24 hours) at 9/23/2024 0551  Last data filed at 9/22/2024 2202  Gross per 24 hour   Intake 597 ml   Output 1625 ml   Net -1028 ml          Ortho/SPM Exam     AAOx4  NAD  Tachycardic  No increased WOB    RLE  Medial wound vac to good suction  Lateral side with fibrinous exudate and weeping ecchymosis distally  Compartments soft/compressible  Sensation diminished (at baseline)   Active toe dorsiflexion & plantarflexion  WWP. Brisk cap refill      Significant Labs:     Recent Labs   Lab 09/23/24  0314   WBC 9.67   RBC 3.02*   HGB 8.4*   HCT 26.6*   *   MCV 88   MCH 27.8   MCHC 31.6*         Significant Imaging: I have reviewed and interpreted all pertinent imaging results/findings.    Assessment/Plan:     * Surgical wound breakdown  Cortes Schroeder is a 64 y.o. male with PMH of DM, HTN  and right trimalleolar ankle fracture status post ex fix on 07/18 with subsequent definitive fixation on 07/26 admitted with right ankle wound dehiscence in the setting of uncontrolled diabetes.      He is s/p I&D of R ankle 09/06. Repeat I&D R medial ankle 09/09. 9/17 angiography and " medial ankle wound vac exchange.   To OR 9/20 and 9/23 for repeat I&D and vac exchange.   Propeller flap with plastics tentatively 9/25.     Diet: NPO since midnight   Pain control: multimodal regimen  PT/OT:  NWB RLE   DVT PPx: Lvx   Abx:  oxacillin continuous; ID following   Cultures:  ankle cx staph +    Dispo: I&D and vac exchange today, possible OR with plastics Wednesday 9/25          Artur Galvan MD  Orthopedics  [facility] - Surgery

## 2024-09-23 NOTE — PROGRESS NOTES
[facility] ([facility])  Endocrinology  Progress Note    Admit Date: 9/6/2024     Reason for Consult: Management of T2DM, Hyperglycemia      Surgical Procedure and Date: S/P irrigation debridement of RLE on 09/06/2024    Diabetes diagnosis year: 1995     Home Diabetes Medications:    Humalog via OmniPod insulin pump  Mounjaro 10 mg weekly     Current pump settings:  Basal 12 am to 2.3 and 6 am to 1.55  ICR to 3 at 12 am, 2 at 4 pm  ISF to 1:20   AIT 3 hrs  Target 110-120        Patient had anaphylaxis reaction to SGLT2 inhibitors specifically Invokana     How often checking glucose at home? Dexcom G6   BG readings on regimen: Average 210's per Dexcom  Hypoglycemia on the regimen?  Yes  Missed doses on regimen?  No     Diabetes Complications include:     Hyperglycemia and Diabetic peripheral neuropathy         Complicating diabetes co morbidities:   HLD, HTN, Obesity         HPI: 64 y.o. male presents to the ED w/ complaint of altered mental status. Hx of R ankle fracture with surgery over a month ago. Patient now presents with sepsis 2/2 R ankle infection s/p ORIF on 07/26. Started on IV vanc and cefepime. Given IVFs. Blood cultures pending. Brought to OR with ortho on 09/06 for irrigation debridement of RLE. Endocrine following for BG and type 2 diabetes.     Lab Results   Component Value Date    LABA1C 10.8 (H) 08/15/2016    HGBA1C 8.5 (H) 09/06/2024         Interval HPI:   No acute events overnight. Patient in room [address] [facility] Blood glucose stable. BG at and below goal on current insulin regimen (SSI, prandial, and basal insulin ). Steroid use- None. Day of Surgery  Renal function- Normal   Lab Results   Component Value Date    CREATININE 0.9 09/23/2024     Vasopressors-  None     Endocrine will continue to follow and manage insulin orders inpatient.     Diet NPO Except for: Sips with Medication     Eating:   NPO  Nausea: No  Hypoglycemia and intervention: No  Fever: No  TPN and/or TF: No  If  "yes, type of TF/TPN and rate: N/A    BP (!) 171/81 (BP Location: Left arm, Patient Position: Lying)   Pulse 93   Temp 99 °F (37.2 °C) (Temporal)   Resp 20   Ht 5' 5" (1.651 m)   Wt 93 kg (205 lb 0.4 oz)   SpO2 100%   BMI 34.12 kg/m²     Labs Reviewed and Include    Recent Labs   Lab 09/23/24  0314   *   CALCIUM 7.9*   ALBUMIN 1.6*   PROT 6.0      K 3.7   CO2 23      BUN 19   CREATININE 0.9   ALKPHOS 162*   ALT 5*   AST 17   BILITOT 0.3     Lab Results   Component Value Date    WBC 9.67 09/23/2024    HGB 8.4 (L) 09/23/2024    HCT 26.6 (L) 09/23/2024    MCV 88 09/23/2024     (H) 09/23/2024     No results for input(s): "TSH", "FREET4" in the last 168 hours.  Lab Results   Component Value Date    HGBA1C 8.5 (H) 09/06/2024       Nutritional status:   Body mass index is 34.12 kg/m².  Lab Results   Component Value Date    ALBUMIN 1.6 (L) 09/23/2024    ALBUMIN 1.6 (L) 09/22/2024    ALBUMIN 1.7 (L) 09/21/2024     Lab Results   Component Value Date    PREALBUMIN 3 (L) 09/06/2024    PREALBUMIN 13 (L) 07/18/2024       Estimated Creatinine Clearance: 88.1 mL/min (based on SCr of 0.9 mg/dL).    Accu-Checks  Recent Labs     09/21/24  1038 09/21/24  1607 09/21/24  2131 09/21/24  2219 09/22/24  0731 09/22/24  0732 09/22/24  1057 09/22/24  1553 09/22/24  2122 09/23/24  0710   POCTGLUCOSE 121* 143* 72 114* 37* 115* 183* 107 115* 152*       Current Medications and/or Treatments Impacting Glycemic Control  Immunotherapy:    Immunosuppressants       None          Steroids:   Hormones (From admission, onward)      None          Pressors:    Autonomic Drugs (From admission, onward)      None          Hyperglycemia/Diabetes Medications:   Antihyperglycemics (From admission, onward)      Start     Stop Route Frequency Ordered    09/22/24 0900  insulin glargine U-100 (Lantus) pen 22 Units         -- SubQ Daily 09/22/24 0842    09/20/24 1524  insulin aspart U-100 pen 0-10 Units         -- SubQ Before meals & " nightly PRN 09/20/24 1424    09/20/24 1130  insulin aspart U-100 pen 2-8 Units         -- SubQ 3 times daily with meals 09/20/24 0844            ASSESSMENT and PLAN    Cardiac/Vascular  Hypertension, essential  On an ACE-I/ ARB per ADA guidelines.   Uncontrolled HTN can worsen insulin resistance.         Endocrine  Uncontrolled type 2 diabetes mellitus with hyperglycemia  BG goal 140-180    - Continue Lantus (Insulin Glargine) 22 units daily   - Novolog 2-8 with meals (Administer   2   units if patient eats 25% of meal, administer 4 units if the patient eats 50% of meal, administer 6 units if patient eats 75% of meal, and administer 8 units if the patient eats 100% of meal.) (Hold while NPO)  - [facility] [place] (150/25)  - BG checks AC/HS  - Hypoglycemia protocol in place    ** Please notify Endocrine for any change and/or advance in diet**  ** Please call Endocrine for any BG related issues **    Discharge Planning:   TBD. Please notify endocrinology prior to discharge.        Orthopedic  * Surgical wound breakdown  Optimize BG control to improve wound healing  Managed per primary team              Nicole Durán PA-C  Endocrinology  [facility] (2nd Fl)

## 2024-09-23 NOTE — SUBJECTIVE & OBJECTIVE
Principal Problem:Surgical wound breakdown    Principal Orthopedic Problem: s/p I&D and wound vac placement on 09/06, 09/20    Interval History: No issues overnight. RLE pulses remain dopplerable. NPO for repeat I&D/vac exchange today.      Tentatively planning for OR Wednesday with plastics for soft tissue coverage.       Review of patient's allergies indicates:   Allergen Reactions    Invokana [canagliflozin] Anaphylaxis    Percocet [oxycodone-acetaminophen] Nausea Only and Hallucinations    Biaxin [clarithromycin]     Hydrocodone Other (See Comments)     Dizzy/nausea/hallucinations    Sulfa (sulfonamide antibiotics) Nausea Only and Rash       Current Facility-Administered Medications   Medication    acetaminophen tablet 1,000 mg    albuterol-ipratropium 2.5 mg-0.5 mg/3 mL nebulizer solution 3 mL    amLODIPine tablet 10 mg    calcium carbonate 200 mg calcium (500 mg) chewable tablet 1,000 mg    dextrose 10% bolus 125 mL 125 mL    dextrose 10% bolus 125 mL 125 mL    dextrose 10% bolus 250 mL 250 mL    dextrose 10% bolus 250 mL 250 mL    enoxaparin injection 40 mg    glucagon (human recombinant) injection 1 mg    glucose chewable tablet 16 g    glucose chewable tablet 24 g    hydrALAZINE tablet 25 mg    insulin aspart U-100 pen 0-10 Units    insulin aspart U-100 pen 2-8 Units    insulin glargine U-100 (Lantus) pen 22 Units    losartan tablet 50 mg    methocarbamoL tablet 500 mg    morphine injection 2 mg    morphine injection 4 mg    nortriptyline capsule 50 mg    ondansetron disintegrating tablet 8 mg    oxacillin 12 g in  mL CONTINUOUS INFUSION    pantoprazole EC tablet 40 mg    polyethylene glycol packet 17 g    senna tablet 8.6 mg    sucralfate 100 mg/mL suspension 1 g    vitamin D 1000 units tablet 1,000 Units     Objective:     Vital Signs (Most Recent):  Temp: 98.1 °F (36.7 °C) (09/23/24 0402)  Pulse: 96 (09/23/24 0402)  Resp: 18 (09/23/24 0402)  BP: (!) 143/66 (09/23/24 0402)  SpO2: (!) 94 % (09/23/24  "0402) Vital Signs (24h Range):  Temp:  [98.1 °F (36.7 °C)-98.7 °F (37.1 °C)] 98.1 °F (36.7 °C)  Pulse:  [] 96  Resp:  [17-18] 18  SpO2:  [92 %-96 %] 94 %  BP: (143-172)/(64-86) 143/66     Weight: 93 kg (205 lb 0.4 oz)  Height: 5' 5" (165.1 cm)  Body mass index is 34.12 kg/m².      Intake/Output Summary (Last 24 hours) at 9/23/2024 0551  Last data filed at 9/22/2024 2202  Gross per 24 hour   Intake 597 ml   Output 1625 ml   Net -1028 ml          Ortho/SPM Exam     AAOx4  NAD  Tachycardic  No increased WOB    RLE  Medial wound vac to good suction  Lateral side with fibrinous exudate and weeping ecchymosis distally  Compartments soft/compressible  Sensation diminished (at baseline)   Active toe dorsiflexion & plantarflexion  WWP. Brisk cap refill      Significant Labs:     Recent Labs   Lab 09/23/24  0314   WBC 9.67   RBC 3.02*   HGB 8.4*   HCT 26.6*   *   MCV 88   MCH 27.8   MCHC 31.6*         Significant Imaging: I have reviewed and interpreted all pertinent imaging results/findings.    "

## 2024-09-23 NOTE — TRANSFER OF CARE
"Anesthesia Transfer of Care Note    Patient: Cortes Schroeder    Procedure(s) Performed: Procedure(s) (LRB):  REPLACEMENT, WOUND VAC; white & black sponge (Right)  REMOVAL, HARDWARE, ANKLE (Right)    Patient location: PACU    Anesthesia Type: general    Post pain: adequate analgesia    Post assessment: no apparent anesthetic complications    Post vital signs: stable    Level of consciousness: awake    Complications: none    Transfer of care protocol was followed      Last vitals: Visit Vitals  /72   Pulse 87   Temp 36.5 °C (97.7 °F) (Temporal)   Resp (!) 21   Ht 5' 5" (1.651 m)   Wt 93 kg (205 lb 0.4 oz)   SpO2 98%   BMI 34.12 kg/m²     "

## 2024-09-23 NOTE — SUBJECTIVE & OBJECTIVE
"Interval HPI:   No acute events overnight. Patient in room [facility] 2ND FLR Periop Pool*. Blood glucose stable. BG at and below goal on current insulin regimen (SSI, prandial, and basal insulin ). Steroid use- None. Day of Surgery  Renal function- Normal   Lab Results   Component Value Date    CREATININE 0.9 09/23/2024     Vasopressors-  None     Endocrine will continue to follow and manage insulin orders inpatient.     Diet NPO Except for: Sips with Medication     Eating:   NPO  Nausea: No  Hypoglycemia and intervention: No  Fever: No  TPN and/or TF: No  If yes, type of TF/TPN and rate: N/A    BP (!) 171/81 (BP Location: Left arm, Patient Position: Lying)   Pulse 93   Temp 99 °F (37.2 °C) (Temporal)   Resp 20   Ht 5' 5" (1.651 m)   Wt 93 kg (205 lb 0.4 oz)   SpO2 100%   BMI 34.12 kg/m²     Labs Reviewed and Include    Recent Labs   Lab 09/23/24  0314   *   CALCIUM 7.9*   ALBUMIN 1.6*   PROT 6.0      K 3.7   CO2 23      BUN 19   CREATININE 0.9   ALKPHOS 162*   ALT 5*   AST 17   BILITOT 0.3     Lab Results   Component Value Date    WBC 9.67 09/23/2024    HGB 8.4 (L) 09/23/2024    HCT 26.6 (L) 09/23/2024    MCV 88 09/23/2024     (H) 09/23/2024     No results for input(s): "TSH", "FREET4" in the last 168 hours.  Lab Results   Component Value Date    HGBA1C 8.5 (H) 09/06/2024       Nutritional status:   Body mass index is 34.12 kg/m².  Lab Results   Component Value Date    ALBUMIN 1.6 (L) 09/23/2024    ALBUMIN 1.6 (L) 09/22/2024    ALBUMIN 1.7 (L) 09/21/2024     Lab Results   Component Value Date    PREALBUMIN 3 (L) 09/06/2024    PREALBUMIN 13 (L) 07/18/2024       Estimated Creatinine Clearance: 88.1 mL/min (based on SCr of 0.9 mg/dL).    Accu-Checks  Recent Labs     09/21/24  1038 09/21/24  1607 09/21/24  2131 09/21/24  2219 09/22/24  0731 09/22/24  0732 09/22/24  1057 09/22/24  1553 09/22/24 2122 09/23/24  0710   POCTGLUCOSE 121* 143* 72 114* 37* 115* 183* 107 115* 152*       Current " Medications and/or Treatments Impacting Glycemic Control  Immunotherapy:    Immunosuppressants       None          Steroids:   Hormones (From admission, onward)      None          Pressors:    Autonomic Drugs (From admission, onward)      None          Hyperglycemia/Diabetes Medications:   Antihyperglycemics (From admission, onward)      Start     Stop Route Frequency Ordered    09/22/24 0900  insulin glargine U-100 (Lantus) pen 22 Units         -- SubQ Daily 09/22/24 0842    09/20/24 1524  insulin aspart U-100 pen 0-10 Units         -- SubQ Before meals & nightly PRN 09/20/24 1424    09/20/24 1130  insulin aspart U-100 pen 2-8 Units         -- SubQ 3 times daily with meals 09/20/24 0844

## 2024-09-23 NOTE — ASSESSMENT & PLAN NOTE
BG goal 140-180    - Continue Lantus (Insulin Glargine) 22 units daily   - Novolog 2-8 with meals (Administer   2   units if patient eats 25% of meal, administer 4 units if the patient eats 50% of meal, administer 6 units if patient eats 75% of meal, and administer 8 units if the patient eats 100% of meal.) (Hold while NPO)  - [facility] SSI (150/25)  - BG checks AC/HS  - Hypoglycemia protocol in place    ** Please notify Endocrine for any change and/or advance in diet**  ** Please call Endocrine for any BG related issues **    Discharge Planning:   TBD. Please notify endocrinology prior to discharge.

## 2024-09-24 ENCOUNTER — PATIENT MESSAGE (OUTPATIENT)
Dept: FAMILY MEDICINE | Facility: CLINIC | Age: 64
End: 2024-09-24
Payer: COMMERCIAL

## 2024-09-24 PROBLEM — I10 HYPERTENSION, ESSENTIAL: Status: RESOLVED | Noted: 2023-06-19 | Resolved: 2024-09-24

## 2024-09-24 LAB
ALBUMIN SERPL BCP-MCNC: 1.7 G/DL (ref 3.5–5.2)
ALP SERPL-CCNC: 156 U/L (ref 55–135)
ALT SERPL W/O P-5'-P-CCNC: 5 U/L (ref 10–44)
ANION GAP SERPL CALC-SCNC: 11 MMOL/L (ref 8–16)
AST SERPL-CCNC: 18 U/L (ref 10–40)
BILIRUB SERPL-MCNC: 0.3 MG/DL (ref 0.1–1)
BUN SERPL-MCNC: 20 MG/DL (ref 8–23)
CALCIUM SERPL-MCNC: 7.9 MG/DL (ref 8.7–10.5)
CHLORIDE SERPL-SCNC: 108 MMOL/L (ref 95–110)
CO2 SERPL-SCNC: 20 MMOL/L (ref 23–29)
CREAT SERPL-MCNC: 1 MG/DL (ref 0.5–1.4)
ERYTHROCYTE [DISTWIDTH] IN BLOOD BY AUTOMATED COUNT: 17 % (ref 11.5–14.5)
EST. GFR  (NO RACE VARIABLE): >60 ML/MIN/1.73 M^2
GLUCOSE SERPL-MCNC: 70 MG/DL (ref 70–110)
HCT VFR BLD AUTO: 25.5 % (ref 40–54)
HGB BLD-MCNC: 8.3 G/DL (ref 14–18)
MCH RBC QN AUTO: 28.3 PG (ref 27–31)
MCHC RBC AUTO-ENTMCNC: 32.5 G/DL (ref 32–36)
MCV RBC AUTO: 87 FL (ref 82–98)
PLATELET # BLD AUTO: 489 K/UL (ref 150–450)
PMV BLD AUTO: 9.5 FL (ref 9.2–12.9)
POCT GLUCOSE: 130 MG/DL (ref 70–110)
POCT GLUCOSE: 299 MG/DL (ref 70–110)
POCT GLUCOSE: 77 MG/DL (ref 70–110)
POCT GLUCOSE: 82 MG/DL (ref 70–110)
POCT GLUCOSE: 88 MG/DL (ref 70–110)
POCT GLUCOSE: 95 MG/DL (ref 70–110)
POTASSIUM SERPL-SCNC: 3.3 MMOL/L (ref 3.5–5.1)
PROT SERPL-MCNC: 6.2 G/DL (ref 6–8.4)
RBC # BLD AUTO: 2.93 M/UL (ref 4.6–6.2)
SODIUM SERPL-SCNC: 139 MMOL/L (ref 136–145)
WBC # BLD AUTO: 9.96 K/UL (ref 3.9–12.7)

## 2024-09-24 PROCEDURE — 36415 COLL VENOUS BLD VENIPUNCTURE: CPT | Performed by: STUDENT IN AN ORGANIZED HEALTH CARE EDUCATION/TRAINING PROGRAM

## 2024-09-24 PROCEDURE — 99233 SBSQ HOSP IP/OBS HIGH 50: CPT | Mod: ,,, | Performed by: STUDENT IN AN ORGANIZED HEALTH CARE EDUCATION/TRAINING PROGRAM

## 2024-09-24 PROCEDURE — 97168 OT RE-EVAL EST PLAN CARE: CPT

## 2024-09-24 PROCEDURE — 25000003 PHARM REV CODE 250: Performed by: STUDENT IN AN ORGANIZED HEALTH CARE EDUCATION/TRAINING PROGRAM

## 2024-09-24 PROCEDURE — 85027 COMPLETE CBC AUTOMATED: CPT | Performed by: STUDENT IN AN ORGANIZED HEALTH CARE EDUCATION/TRAINING PROGRAM

## 2024-09-24 PROCEDURE — 97530 THERAPEUTIC ACTIVITIES: CPT

## 2024-09-24 PROCEDURE — 63600175 PHARM REV CODE 636 W HCPCS: Performed by: STUDENT IN AN ORGANIZED HEALTH CARE EDUCATION/TRAINING PROGRAM

## 2024-09-24 PROCEDURE — 25000003 PHARM REV CODE 250: Performed by: INTERNAL MEDICINE

## 2024-09-24 PROCEDURE — 99232 SBSQ HOSP IP/OBS MODERATE 35: CPT | Mod: ,,,

## 2024-09-24 PROCEDURE — 21400001 HC TELEMETRY ROOM

## 2024-09-24 PROCEDURE — 80053 COMPREHEN METABOLIC PANEL: CPT | Performed by: STUDENT IN AN ORGANIZED HEALTH CARE EDUCATION/TRAINING PROGRAM

## 2024-09-24 RX ORDER — MIDAZOLAM HYDROCHLORIDE 1 MG/ML
.5-4 INJECTION, SOLUTION INTRAMUSCULAR; INTRAVENOUS
Status: CANCELLED | OUTPATIENT
Start: 2024-09-24

## 2024-09-24 RX ORDER — INSULIN GLARGINE 100 [IU]/ML
16 INJECTION, SOLUTION SUBCUTANEOUS DAILY
Status: DISCONTINUED | OUTPATIENT
Start: 2024-09-24 | End: 2024-09-25

## 2024-09-24 RX ORDER — POTASSIUM CHLORIDE 20 MEQ/1
40 TABLET, EXTENDED RELEASE ORAL ONCE
Status: COMPLETED | OUTPATIENT
Start: 2024-09-24 | End: 2024-09-24

## 2024-09-24 RX ORDER — FENTANYL CITRATE 50 UG/ML
25-200 INJECTION, SOLUTION INTRAMUSCULAR; INTRAVENOUS
Status: CANCELLED | OUTPATIENT
Start: 2024-09-24

## 2024-09-24 RX ADMIN — SUCRALFATE 1 G: 1 SUSPENSION ORAL at 11:09

## 2024-09-24 RX ADMIN — INSULIN GLARGINE 16 UNITS: 100 INJECTION, SOLUTION SUBCUTANEOUS at 10:09

## 2024-09-24 RX ADMIN — SUCRALFATE 1 G: 1 SUSPENSION ORAL at 06:09

## 2024-09-24 RX ADMIN — ACETAMINOPHEN 1000 MG: 325 TABLET ORAL at 09:09

## 2024-09-24 RX ADMIN — LOSARTAN POTASSIUM 50 MG: 25 TABLET, FILM COATED ORAL at 09:09

## 2024-09-24 RX ADMIN — PANTOPRAZOLE SODIUM 40 MG: 40 TABLET, DELAYED RELEASE ORAL at 09:09

## 2024-09-24 RX ADMIN — INSULIN ASPART 2 UNITS: 100 INJECTION, SOLUTION INTRAVENOUS; SUBCUTANEOUS at 06:09

## 2024-09-24 RX ADMIN — POTASSIUM CHLORIDE 40 MEQ: 1500 TABLET, EXTENDED RELEASE ORAL at 06:09

## 2024-09-24 RX ADMIN — OXACILLIN 12 G: 2 INJECTION, POWDER, FOR SOLUTION INTRAMUSCULAR; INTRAVENOUS at 05:09

## 2024-09-24 RX ADMIN — CHOLECALCIFEROL TAB 25 MCG (1000 UNIT) 1000 UNITS: 25 TAB at 09:09

## 2024-09-24 RX ADMIN — ACETAMINOPHEN 1000 MG: 325 TABLET ORAL at 06:09

## 2024-09-24 RX ADMIN — ACETAMINOPHEN 1000 MG: 325 TABLET ORAL at 01:09

## 2024-09-24 RX ADMIN — AMLODIPINE BESYLATE 10 MG: 10 TABLET ORAL at 09:09

## 2024-09-24 RX ADMIN — NORTRIPTYLINE HYDROCHLORIDE 50 MG: 25 CAPSULE ORAL at 08:09

## 2024-09-24 RX ADMIN — ENOXAPARIN SODIUM 40 MG: 40 INJECTION SUBCUTANEOUS at 06:09

## 2024-09-24 RX ADMIN — HYDRALAZINE HYDROCHLORIDE 25 MG: 25 TABLET ORAL at 02:09

## 2024-09-24 NOTE — PROGRESS NOTES
"[facility] - [facility]  Adult Nutrition  Progress Note    SUMMARY   Recommendations    Continue diabetic diet  Continue Boost Breeze & boost GC BID as tolerated  If pt not consuming all 4 protein shakes, discontinue least desired one (preferably boost breeze)  Continue Abhijit BID  If additional protein needed, add Prosource once daily for (16 grams of protein)   RD following    Goals: Meet % een/epn by next RD f/u  Nutrition Goal Status: progressing towards goal  Communication of RD Recs: other (comment) (poc)    Assessment and Plan    Nutrition Problem  Increased Protein needs     Related to (etiology):   Wound healing     Signs and Symptoms (as evidenced by):   Leg wound     Interventions/Recommendations (treatment strategy):  Collaboration of nutritional care with other providers.  Commercial beverage     Nutrition Diagnosis Status:   Continues    Reason for Assessment    Reason For Assessment: RD follow-up  Relevant Medical History: DM2 (A1c 8.5), GERD, HLD, HTN  Interdisciplinary Rounds: did not attend  General Information Comments: RD f/u, pt 1 day post op. Poor intake. Supplements being provided. RD following.  Nutrition Discharge Planning: diabetic, high protein diet    Nutrition Risk Screen    Nutrition Risk Screen: no indicators present    Nutrition/Diet History    Spiritual, Cultural Beliefs, Bahai Practices, Values that Affect Care: no  Food Allergies: NKFA    Anthropometrics    Temp: 97.8 °F (36.6 °C)  Height Method: Estimated  Height: 5' 5" (165.1 cm)  Height (inches): 65 in  Weight Method: Bed Scale  Weight: 93 kg (205 lb 0.4 oz)  Weight (lb): 205.03 lb  Ideal Body Weight (IBW), Male: 136 lb  % Ideal Body Weight, Male (lb): 150.76 %  BMI (Calculated): 34.1  BMI Grade: 30 - 34.9- obesity - grade I       Lab/Procedures/Meds    Pertinent Labs Reviewed: reviewed  Pertinent Labs Comments: H/h: 8.3/25.5, potassium: 3.3, alt: 5  Pertinent Medications Reviewed: reviewed  Pertinent Medications Comments: " Enoxaparin, insulin, abx, pantoprazole, vitamin D, senna, polyethylene glycol      Estimated/Assessed Needs    Weight Used For Calorie Calculations: 93 kg (205 lb 0.4 oz)  Energy Calorie Requirements (kcal): 1651 (msj)  Energy Need Method: [place]  Protein Requirements: 139-186 (1.5-2 g/kg)  Weight Used For Protein Calculations: 93 kg (205 lb 0.4 oz)     Estimated Fluid Requirement Method: RDA Method  RDA Method (mL): 1651         Nutrition Prescription Ordered    Current Diet Order: Diabetic  Oral Nutrition Supplement: Boost Breeze TID, Boost Glucose Contol TID, Abhijit BID    Evaluation of Received Nutrient/Fluid Intake    I/O: -14.3 L since admit  Comments: LBM 9/23  Tolerance: tolerating  % Intake of Estimated Energy Needs: 0 - 25 %  % Meal Intake: 0 - 25 %    Nutrition Risk    Level of Risk/Frequency of Follow-up: low - moderate (f/u 1x/week)     Monitor and Evaluation    Food and Nutrient Intake: energy intake, food and beverage intake  Food and Nutrient Adminstration: diet order  Anthropometric Measurements: height/length, weight change, weight, body mass index  Biochemical Data, Medical Tests and Procedures: electrolyte and renal panel, glucose/endocrine profile, gastrointestinal profile, inflammatory profile, lipid profile     Nutrition Follow-Up    RD Follow-up?: Yes    Pamella Castrejon RD, LDN

## 2024-09-24 NOTE — ASSESSMENT & PLAN NOTE
Cortes Schroeder is a 64 y.o. male with PMH of DM, HTN  and right trimalleolar ankle fracture status post ex fix on 07/18 with subsequent definitive fixation on 07/26 admitted with right ankle wound dehiscence in the setting of uncontrolled diabetes.      He is s/p I&D of R ankle 09/06. Repeat I&D R medial ankle 09/09. 9/17 angiography and medial ankle wound vac exchange.   To OR 9/20 and 9/23 for repeat I&D and vac exchange. Hardware removed on 9/23.   Propeller flap with plastics 9/25.     Diet: NPO at midnight   Pain control: multimodal regimen  PT/OT:  NWB RLE   DVT PPx: Lvx   Abx:  oxacillin continuous; ID following   Cultures:  ankle cx staph +    Dispo: OR with plastics Wednesday 9/25

## 2024-09-24 NOTE — SUBJECTIVE & OBJECTIVE
Interval History: Remains AF, VSS, wbc nml. Well maintained on Iv-oxacillin. Repeat bld cxs 09/8 remain NGTD. S/p hardware removal 09/23. Pt with ongoing poor healing, at risk for BKA. Plastics plans for closure tomorrow 09/25. Pt doing well post-op.     Review of Systems   Musculoskeletal:  Positive for arthralgias and myalgias.   Skin:  Positive for wound.   All other systems reviewed and are negative.    Objective:     Vital Signs (Most Recent):  Temp: 98.1 °F (36.7 °C) (09/24/24 0456)  Pulse: 92 (09/24/24 0456)  Resp: 18 (09/24/24 0456)  BP: (!) 156/73 (09/24/24 0456)  SpO2: (!) 94 % (09/24/24 0456) Vital Signs (24h Range):  Temp:  [97.7 °F (36.5 °C)-99 °F (37.2 °C)] 98.1 °F (36.7 °C)  Pulse:  [86-99] 92  Resp:  [17-27] 18  SpO2:  [94 %-100 %] 94 %  BP: (139-185)/(70-87) 156/73     Weight: 93 kg (205 lb 0.4 oz)  Body mass index is 34.12 kg/m².    Estimated Creatinine Clearance: 79.2 mL/min (based on SCr of 1 mg/dL).     Physical Exam  Vitals and nursing note reviewed.   Constitutional:       General: He is not in acute distress.     Appearance: Normal appearance. He is not toxic-appearing or diaphoretic.   HENT:      Nose: No congestion.      Mouth/Throat:      Pharynx: No oropharyngeal exudate.   Eyes:      General: No scleral icterus.     Conjunctiva/sclera: Conjunctivae normal.   Cardiovascular:      Rate and Rhythm: Normal rate and regular rhythm.      Heart sounds: No murmur heard.  Pulmonary:      Effort: Pulmonary effort is normal. No respiratory distress.      Breath sounds: No wheezing.   Abdominal:      General: Bowel sounds are normal. There is no distension.      Palpations: Abdomen is soft.      Tenderness: There is no abdominal tenderness.   Musculoskeletal:      Cervical back: Neck supple. No tenderness.      Comments: Right ankle with surgical dressings in place + wound vac    Left wrist - no signs of infection   Skin:     General: Skin is warm and dry.   Neurological:      Mental Status: He is  alert and oriented to person, place, and time.   Psychiatric:         Mood and Affect: Mood normal.         Behavior: Behavior normal.          Significant Labs: Blood Culture:   Recent Labs   Lab 09/06/24  0235 09/07/24  0910 09/08/24  1641 09/10/24  1711   LABBLOO Gram stain aer bottle: Gram positive cocci in clusters resembling Staph  Gram stain alicia bottle: Gram positive cocci in clusters resembling Staph  Results called to and read back by:Luciana Altamirano RN 09/06/2024  20:52  STAPHYLOCOCCUS AUREUS*  Gram stain aer bottle: Gram positive cocci in clusters resembling Staph  Gram stain alicia bottle: Gram positive cocci in clusters resembling Staph  Results called to and read back by:Luciana Altamirano RN 09/06/2024  20:55  STAPHYLOCOCCUS AUREUS  For susceptibility see order #[id number]  * Gram stain aer bottle: Gram positive cocci in clusters resembling Staph  Results called to and read back by: Roseanne Guevara RN 09/08/2024  14:07  Gram stain alicia bottle: Gram positive cocci in clusters resembling Staph  Positive results previously called 09/08/2024  STAPHYLOCOCCUS AUREUS  ID consult required at [facility].  For susceptibility see order #[id number]  *  Gram stain aer bottle: Gram positive cocci in clusters resembling Staph  Gram stain alicia bottle: Gram positive cocci in clusters resembling Staph  Results called to and read back by:Luciana Altamirano LPN 09/08/2024  03:59  STAPHYLOCOCCUS AUREUS  ID consult required at [facility].  For susceptibility see order #[id number]  * No growth after 5 days.  No growth after 5 days. No growth after 5 days.     CBC:   Recent Labs   Lab 09/23/24 0314 09/24/24  0257   WBC 9.67 9.96   HGB 8.4* 8.3*   HCT 26.6* 25.5*   * 489*     CMP:   Recent Labs   Lab 09/23/24 0314 09/24/24  0257    139   K 3.7 3.3*    108   CO2 23 20*   * 70   BUN 19 20   CREATININE 0.9 1.0   CALCIUM 7.9*  7.9*   PROT 6.0 6.2   ALBUMIN 1.6* 1.7*   BILITOT 0.3 0.3   ALKPHOS 162* 156*   AST 17 18   ALT 5* 5*   ANIONGAP 11 11     Microbiology Results (last 7 days)       Procedure Component Value Units Date/Time    Fungus culture [[id number]] Collected: 09/06/24 1446    Order Status: Completed Specimen: Incision site from Ankle, Right Updated: 09/23/24 0844     Fungus (Mycology) Culture Culture in progress      No fungus isolated after 2 weeks    Narrative:      Right ankle wound - culture          Wound Culture:   Recent Labs   Lab 09/06/24  1446   LABAERO STAPHYLOCOCCUS AUREUS  Many  *  STREPTOCOCCUS AGALACTIAE (GROUP B)  Many  Beta-hemolytic streptococci are routinely susceptible to   penicillins,cephalosporins and carbapenems.  *     All pertinent labs within the past 24 hours have been reviewed.    Significant Imaging: I have reviewed all pertinent imaging results/findings within the past 24 hours.    I have personally reviewed records / hospital notes from   service and other specialty providers. I have also reviewed CBC, CMP/BMP,  cultures and imaging with my interpretation as documented in my assessment / plan.    Patient is high risk for infectious complications given pt's age, multiple co-morbidities, and case complexity.      Time: 50 minutes   50% of time spent on face-to-face counseling and coordination of care. Counseling included review of test results, diagnosis, and treatment plan with patient and/or family.

## 2024-09-24 NOTE — ASSESSMENT & PLAN NOTE
Patient's FSGs controlled. Endocrine consulted and managing patient's diabetes in hospital and appreciate assistance. Patient on insulin pump at [facility] but not in hospital and on basal/prandial insulin in hospital as per Endocrine. Appreciate Endocrine assistance on this case.   Last A1c reviewed-   Lab Results   Component Value Date    LABA1C 10.8 (H) 08/15/2016    HGBA1C 8.5 (H) 09/06/2024     Most recent fingerstick glucose reviewed-   Recent Labs   Lab 09/23/24  2103 09/24/24  0826 09/24/24  0949 09/24/24  1141   POCTGLUCOSE 95 77 82 88       Current correctional scale  Low  Maintain anti-hyperglycemic dose as follows-   Antihyperglycemics (From admission, onward)      Start     Stop Route Frequency Ordered    09/24/24 0900  insulin glargine U-100 (Lantus) pen 16 Units         -- SubQ Daily 09/24/24 0855    09/20/24 1524  insulin aspart U-100 pen 0-10 Units         -- SubQ Before meals & nightly PRN 09/20/24 1424    09/20/24 1130  insulin aspart U-100 pen 2-8 Units         -- SubQ 3 times daily with meals 09/20/24 0844           - Endocrine consulted:  - At this time, recommend that the patient remain off of his pump since he cannot be controlled in the Auto mode. Patient does not interact with pump frequently and relies on the auto mode algorithm.   - Insulin dosing as per Endocrine recommendations.   - Hold oral antihyperglycemics during hospitalization   - Monitor blood sugars with meals and at bedtime in hospital.  - Target blood sugars 140-180 in hospital.  - Diabetic diet.

## 2024-09-24 NOTE — SUBJECTIVE & OBJECTIVE
"Interval HPI:   No acute events overnight. Patient in room 542/542 A. Blood glucose stable. BG at and below goal on current insulin regimen (SSI, prandial, and basal insulin ). Steroid use- None.   1 Day Post-Op  Renal function-   Lab Results   Component Value Date    CREATININE 1.0 2024        Vasopressors-  None     Diet diabetic  Calorie     Eatin%  Nausea: No  Hypoglycemia and intervention: No  Fever: No  TPN and/or TF: No    BP (!) 156/73 (Patient Position: Lying)   Pulse 92   Temp 98.1 °F (36.7 °C) (Oral)   Resp 18   Ht 5' 5" (1.651 m)   Wt 93 kg (205 lb 0.4 oz)   SpO2 (!) 94%   BMI 34.12 kg/m²     Labs Reviewed and Include    Recent Labs   Lab 24  0257   GLU 70   CALCIUM 7.9*   ALBUMIN 1.7*   PROT 6.2      K 3.3*   CO2 20*      BUN 20   CREATININE 1.0   ALKPHOS 156*   ALT 5*   AST 18   BILITOT 0.3     Lab Results   Component Value Date    WBC 9.96 2024    HGB 8.3 (L) 2024    HCT 25.5 (L) 2024    MCV 87 2024     (H) 2024     No results for input(s): "TSH", "FREET4" in the last 168 hours.  Lab Results   Component Value Date    HGBA1C 8.5 (H) 2024       Nutritional status:   Body mass index is 34.12 kg/m².  Lab Results   Component Value Date    ALBUMIN 1.7 (L) 2024    ALBUMIN 1.6 (L) 2024    ALBUMIN 1.6 (L) 2024     Lab Results   Component Value Date    PREALBUMIN 3 (L) 2024    PREALBUMIN 13 (L) 2024       Estimated Creatinine Clearance: 79.2 mL/min (based on SCr of 1 mg/dL).    Accu-Checks  Recent Labs     24  0731 24  0732 24  1057 24  1553 24  2122 24  0710 24  1212 24  1354 24  1535 24  2103   POCTGLUCOSE 37* 115* 183* 107 115* 152* 164* 175* 181* 95       Current Medications and/or Treatments Impacting Glycemic Control  Immunotherapy:    Immunosuppressants       None          Steroids:   Hormones (From admission, onward)      None      "     Pressors:    Autonomic Drugs (From admission, onward)      None          Hyperglycemia/Diabetes Medications:   Antihyperglycemics (From admission, onward)      Start     Stop Route Frequency Ordered    09/24/24 0900  insulin glargine U-100 (Lantus) pen 16 Units         -- SubQ Daily 09/24/24 0855    09/20/24 1524  insulin aspart U-100 pen 0-10 Units         -- SubQ Before meals & nightly PRN 09/20/24 1424    09/20/24 1130  insulin aspart U-100 pen 2-8 Units         -- SubQ 3 times daily with meals 09/20/24 0844

## 2024-09-24 NOTE — PROGRESS NOTES
[facility] Medicine  Progress Note    Patient Name: Cortes Schroeder  MRN: [id number]  Patient Class: IP- Inpatient   Admission Date: 9/6/2024  Length of Stay: 18 days  Attending Physician: Raisa Kearns MD  Primary Care Provider: Andrew Sinclair Jr., MD        Subjective:     Principal Problem:Surgical wound breakdown        HPI:  Cortes Schroeder is a 64 y.o. M with PMHx of anxiety, T2DM, GERD, HLD, HTN who presented to ED for AMS. He had a fall in July that resulted in R trimalleolar fracture and L distal radius fracture. He had external fixation on 07/18 and then ORIF on 07/26 with Dr. Liz. He was prescribed clinda 300 mg on 08/28 for a ten day course. Patient was doing well when he acutely became altered and not acting like himself a few hours ago. Patient family member drove him here from [facility] for ortho consult. R medial ankle wound dehisced with redness and swelling around it. No CPM fever, cough, N/V/D or seizure.    In ED: T max 99.1. SIRS 2/4 with WBC 18 and . CMP notable for Na 127, Cr 1.7, . Phos 1.9. .3. BNP 73. Trop neg. A1c 8.5. R tib fib XR notes There are 2 abandoned anterior-posteriorly oriented pin tracks in the right mid tibial shaft.  On AP views, each of these pin tracks demonstrates a small faint internal density, possibly representing a sequestrum. Ortho and endocrine consulted. Given IV vanc and IV clindamycin. Given 2.7L IVFs. Admitted to hospital medicine for sepsis 2/2 infected hardware.     Overview/Hospital Course:  Cortes Schroeder is a 63 y/o male admitted to hospital medicine for sepsis related to right ankle from surgical wound infection in patient with recent ORIF of right ankle fracture done on 7/26/2024. Patient started on empiric IV Vancomycin and IV Cefepime and given IVF's as per sepsis protocol. Orthopedics consulted and patient taken to OR on 9/6/2024 and had irrigation debridement of right surgical incision and  placement of wound vac.Endocrine consulted to assist in management of his diabetes while in hospital. Blood cultures from admit returned with MSSA. Infectious Disease consulted. Wound culture returned positive for Group B streptococcus and MSSA. Echo done due to bacteremia without concern for vegetations. ID changed antibiotics to IV Oxacillin. Patient with new cough and worsening WBC. CTA chest negative for PE but notes small area of ground glass changes to RUL. Patient placed on 5 day course of po Doxycycline to treat as per ID and completed for possible pneumonia. Patient taken back to OR on 09/09/2024 with Ortho and had another irrigation and debridement and replacement of wound vac to right ankle. Plastics consulted for flap evaluation and recommended vascular evaluation. CTA right lower extremity done and showed moderate atherosclerosis and multifocal high grade stenosis throughout right calf arteries. Dietary consulted for malnutrition and started on Boost supplementation to maximize his nutrition for a flap and wound healing. Vascular surgery consulted per Plastics recs as they would like to pre-optimize blood flow prior to any free flap or pedicled propellar flap. Vascular recommended angiogram of right leg but patient developed DEEPALI. Nephrology consulted and suspected ATN related to contrast nephropathy. Patient placed on supportive care. DEEPALI resolved. Patyoanetn taken for unilateral angiogram by Vascular on 9/17 and underwent PTA of right posterior tibial artery and right anterior tibial artery. After revascularization of right leg pl;an to do flap but working on timing with Ortho and Plastics. Patient to go back to OR again on 9/20 for another irrigation and debridement and replacement of wound vac to right ankle wound by Ortho. Plastics plans propeller flap to right ankle on 9/25 and then will need to remain in hospital for 1 week after flap for dangling protocol and monitoring of flap per Plastics. Repeat  blood cultures from 9/8 and 9/10 no growth. Patient taken back to OR on 9/20 with Dr. Moe Liz and had another I&D of right ankle and wound and replacement of wound vac. Patient to remain non weight bearing post-op and Plastics plans flap placement while in hospital on 9/25. Patient to go back to OR on for another I&D and wound vac change on 9/23. Patient taken back to OR on 9/23 with Dr. Liz and had another I&D and wound vac change with Orthopedics. Ortho noted anterolateral foot eschar developing. Distal lateral wound breakdown and ortho opened up majority of lateral wound and excised surrounding skin and subcutaneous tissue. Ortho removed all ankle hardware. Wound vacs placed medially and laterally. Patient to return back to OR on 9/25 for propeller flap with Plastics.     Interval History: Patient's roommate Sukhdeep coming today and brining wing for patient to eat as he is not eating our hospital food and does not like Boost. He mainly has been eating cereal, yogurt in hospital. Labs reviewed. Hgb stable at 8.3 today and was 8.4 yesterday. WBC 9,960 and normal. Blood sugars well controlled and Endocrine adjusting insulin dosing. Potassium low at 3.3 today and will replace with oral potassium. Patient supposed to go to OR tomorrow for propeller flap by Plastics. Patient reports 2/10 pain to right ankle. Pain well controlled. Continue IV Oxacillin to treat right ankle infection.     Review of Systems   Constitutional:  Negative for fever.   Respiratory:  Negative for shortness of breath.    Cardiovascular:  Negative for chest pain.   Gastrointestinal:  Negative for nausea and vomiting.   Musculoskeletal:  Positive for arthralgias (Right ankle).   Psychiatric/Behavioral:  Negative for confusion.      Objective:     Vital Signs (Most Recent):  Temp: 97.8 °F (36.6 °C) (09/24/24 1356)  Pulse: 94 (09/24/24 1356)  Resp: 20 (09/24/24 1356)  BP: 166/82 (09/24/24 1356)  SpO2: 95 % (09/24/24 1356) on room air Vital  Signs (24h Range):  Temp:  [97.8 °F (36.6 °C)-98.7 °F (37.1 °C)] 97.8 °F (36.6 °C)  Pulse:  [86-98] 94  Resp:  [16-20] 20  SpO2:  [94 %-96 %] 95 %  BP: (152-183)/(70-82) 176/82     Weight: 93 kg (205 lb 0.4 oz)  Body mass index is 34.12 kg/m².    Intake/Output Summary (Last 24 hours) at 9/24/2024 1453  Last data filed at 9/24/2024 1359  Gross per 24 hour   Intake 1170 ml   Output 2300 ml   Net -1130 ml         Physical Exam  Vitals and nursing note reviewed.   Constitutional:       General: He is awake. He is not in acute distress.     Appearance: Normal appearance. He is well-developed. He is obese.      Comments: Patient sitting up in bed in no distress and in no apparent pain. Patient in good spirits.    Eyes:      Conjunctiva/sclera: Conjunctivae normal.   Cardiovascular:      Rate and Rhythm: Normal rate and regular rhythm.      Heart sounds: Normal heart sounds. No murmur heard.  Pulmonary:      Effort: Pulmonary effort is normal. No respiratory distress.      Breath sounds: Normal breath sounds. No wheezing.   Abdominal:      General: Abdomen is flat. Bowel sounds are normal. There is no distension.      Palpations: Abdomen is soft.      Tenderness: There is no abdominal tenderness.   Musculoskeletal:      Left lower leg: No edema.      Comments: Wound vac in place to right ankle and ACE bandage overlying wound vac.    Skin:     General: Skin is warm.      Findings: No erythema.   Neurological:      Mental Status: He is alert and oriented to person, place, and time.   Psychiatric:         Mood and Affect: Mood normal.         Behavior: Behavior normal. Behavior is cooperative.         Thought Content: Thought content normal.         Judgment: Judgment normal.             Significant Labs: CBC:   Recent Labs   Lab 09/23/24 0314 09/24/24 0257   WBC 9.67 9.96   HGB 8.4* 8.3*   HCT 26.6* 25.5*   * 489*     CMP:   Recent Labs   Lab 09/23/24 0314 09/24/24 0257    139   K 3.7 3.3*    108   CO2  23 20*   * 70   BUN 19 20   CREATININE 0.9 1.0   CALCIUM 7.9* 7.9*   PROT 6.0 6.2   ALBUMIN 1.6* 1.7*   BILITOT 0.3 0.3   ALKPHOS 162* 156*   AST 17 18   ALT 5* 5*   ANIONGAP 11 11     Blood Sugars (AccuCheck):    Recent Labs     09/23/24  1354 09/23/24  1535 09/23/24  2103 09/24/24  0826 09/24/24  0949 09/24/24  1141   POCTGLUCOSE 175* 181* 95 77 82 88     Significant Imaging: I have reviewed all pertinent imaging results/findings within the past 24 hours.    Assessment/Plan:      * Surgical wound breakdown  Closed trimalleolar fracture of right ankle s/p ORIF in 7/2024  Surgical site infection  63 yo male presenting with confusion and worsening redness, swelling and pain to right ankle. Patient with sepsis criteria on admit and right ankle wound felt to be source of infection.   - Ortho consulted and patient taken to OR 9/06/2024 for debridement of surgical wound with wound vac placed. Cultures taken and grew both MSSA and Group B streptococcus from wound. Patient taken back again to OR with Ortho with Dr. Liz and patient had another I&Dand wound vac change on 9/9/2024. Orthopedics took back to OR again on 9/20/2024 with Dr. Liz and underwent another I&D of right ankle and wound and replacement of wound vac. Patient taken back to OR with OR again on 9/23 with Dr. Liz and had another I&D and wound vac change with Orthopedics. Ortho noted anterolateral foot eschar developing. Distal lateral wound breakdown and ortho opened up majority of lateral wound and excised surrounding skin and subcutaneous tissue. Ortho removed all ankle hardware. Wound vacs placed medially and laterally.   - Orthopedics recommended Plastics evaluation for free flap and as part of evaluation recommended Vascular evaluation. Vascular evaluated patient and angiogram of right leg done and PTA done and revascularization of right PT/AT. Plastics plan propeller flap on 9/25/2024. Per Plastics will need at least a week in hospital  after flap for dangle protocols.  - Wound cultures -- MSSA and Group B strep from right ankle.   - Blood cultures -- MSSA on admit but repeat blood cultures negative.   - ID consulted and following and appreciate recs:  - Continue IV Oxacillin 12 g every 24 hours continuous infusion. Plan for orals if hardware retained once completed.  - Anticipate 6 weeks of IV antibiotics from date of most recent washout.  - Continue PT/OT and non weight bearing to right lower extremity as per Ortho. Continue wound vac as per Ortho to surgical site.  - Pain controlled and continue multimodals.   - Continue Lovenox 40 mg subcutaneous daily for DVT prophylaxis.     MSSA bacteremia  - Blood cultures from admit grew MSSA. Repeat blood cultures done on 9/8 and 9/10 no growth.  - Infectious Disease consulted and patient placed on IV Oxacillin infusion as suspected source was right ankle surgical wound infection for bacteremia as grew MSSA from right ankle wound.   - Echo done without concern for vegetations.    Uncontrolled type 2 diabetes mellitus with hyperglycemia  Patient's FSGs controlled. Endocrine consulted and managing patient's diabetes in hospital and appreciate assistance. Patient on insulin pump at [facility] but not in hospital and on basal/prandial insulin in hospital as per Endocrine. Appreciate Endocrine assistance on this case.   Last A1c reviewed-   Lab Results   Component Value Date    LABA1C 10.8 (H) 08/15/2016    HGBA1C 8.5 (H) 09/06/2024     Most recent fingerstick glucose reviewed-   Recent Labs   Lab 09/23/24  2103 09/24/24  0826 09/24/24  0949 09/24/24  1141   POCTGLUCOSE 95 77 82 88       Current correctional scale  Low  Maintain anti-hyperglycemic dose as follows-   Antihyperglycemics (From admission, onward)      Start     Stop Route Frequency Ordered    09/24/24 0900  insulin glargine U-100 (Lantus) pen 16 Units         -- SubQ Daily 09/24/24 0855    09/20/24 1524  insulin aspart U-100 pen 0-10 Units         --  SubQ Before meals & nightly PRN 09/20/24 1424    09/20/24 1130  insulin aspart U-100 pen 2-8 Units         -- SubQ 3 times daily with meals 09/20/24 0844           - Endocrine consulted:  - At this time, recommend that the patient remain off of his pump since he cannot be controlled in the Auto mode. Patient does not interact with pump frequently and relies on the auto mode algorithm.   - Insulin dosing as per Endocrine recommendations.   - Hold oral antihyperglycemics during hospitalization   - Monitor blood sugars with meals and at bedtime in hospital.  - Target blood sugars 140-180 in hospital.  - Diabetic diet.     Gastroesophageal reflux disease without esophagitis  Controlled. Continue Carafate every 6 hours po to treat.       Airway malacia  Noted on CT scan of chest. Patient with no acute issues and incidentally note don CT scan of chest. Patient asymptomatic.       Acute blood loss anemia  Controlled. Anemia is likely due to acute blood loss which was from surgery. Most recent hemoglobin and hematocrit are listed below.  Recent Labs     09/22/24  0215 09/23/24  0314 09/24/24  0257   HGB 7.6* 8.4* 8.3*   HCT 24.8* 26.6* 25.5*       Plan  - Monitor serial CBC: Daily  - Transfuse PRBC if patient becomes hemodynamically unstable, symptomatic or H/H drops below 7/21.  - Patient has not received any PRBC transfusions to date  - Patient's anemia is currently stable and monitor. No indication for blood transfusion at this time.     PAD (peripheral artery disease)  Present on admit. Patient noted to have high grade stenosis on CTA of right leg. Vascular surgery consulted and patient taken to OR and had unilateral angiogram of right leg and had PT/AT artery stenosis on 9/17 and underwent PTA of right PT/AT arteries. Appreciate Vascular surgery assistance on case.       Thrombocytosis  Improving with treatment of infection. Present on admit and related to infection causing increase in platelet count. Monitor daily  CBC.       On pre-exposure prophylaxis for HIV  Patient on Truvada as outpatient but held in hospital due to elevated AST and ALT that has resolved. Plan to resume Truvada on discharge.     Class 1 obesity due to excess calories with serious comorbidity and body mass index (BMI) of 34.0 to 34.9 in adult  Body mass index is 34.12 kg/m². Morbid obesity complicates all aspects of disease management from diagnostic modalities to treatment. Weight loss encouraged and health benefits explained to patient.       VTE Risk Mitigation (From admission, onward)           Ordered     enoxaparin injection 40 mg  Daily         09/18/24 0808     IP VTE HIGH RISK PATIENT  Once         09/06/24 1735                    Discharge Planning   JAYLEEN: 10/2/2024     Code Status: Full Code   Is the patient medically ready for discharge?:     Reason for patient still in hospital (select all that apply): Patient trending condition  Discharge Plan A: Post-acute facility placement        Raisa Kearns MD  Department of Hospital Medicine   [facility] - Surgery

## 2024-09-24 NOTE — SUBJECTIVE & OBJECTIVE
Interval History: Patient's roommate Sukhdeep coming today and brining wing for patient to eat as he is not eating our hospital food and does not like Boost. He mainly has been eating cereal, yogurt in hospital. Labs reviewed. Hgb stable at 8.3 today and was 8.4 yesterday. WBC 9,960 and normal. Blood sugars well controlled and Endocrine adjusting insulin dosing. Potassium low at 3.3 today and will replace with oral potassium. Patient supposed to go to OR tomorrow for propeller flap by Plastics. Patient reports 2/10 pain to right ankle. Pain well controlled. Continue IV Oxacillin to treat right ankle infection.     Review of Systems   Constitutional:  Negative for fever.   Respiratory:  Negative for shortness of breath.    Cardiovascular:  Negative for chest pain.   Gastrointestinal:  Negative for nausea and vomiting.   Musculoskeletal:  Positive for arthralgias (Right ankle).   Psychiatric/Behavioral:  Negative for confusion.      Objective:     Vital Signs (Most Recent):  Temp: 97.8 °F (36.6 °C) (09/24/24 1356)  Pulse: 94 (09/24/24 1356)  Resp: 20 (09/24/24 1356)  BP: 166/82 (09/24/24 1356)  SpO2: 95 % (09/24/24 1356) on room air Vital Signs (24h Range):  Temp:  [97.8 °F (36.6 °C)-98.7 °F (37.1 °C)] 97.8 °F (36.6 °C)  Pulse:  [86-98] 94  Resp:  [16-20] 20  SpO2:  [94 %-96 %] 95 %  BP: (152-183)/(70-82) 176/82     Weight: 93 kg (205 lb 0.4 oz)  Body mass index is 34.12 kg/m².    Intake/Output Summary (Last 24 hours) at 9/24/2024 1453  Last data filed at 9/24/2024 1359  Gross per 24 hour   Intake 1170 ml   Output 2300 ml   Net -1130 ml         Physical Exam  Vitals and nursing note reviewed.   Constitutional:       General: He is awake. He is not in acute distress.     Appearance: Normal appearance. He is well-developed. He is obese.      Comments: Patient sitting up in bed in no distress and in no apparent pain. Patient in good spirits.    Eyes:      Conjunctiva/sclera: Conjunctivae normal.   Cardiovascular:      Rate  and Rhythm: Normal rate and regular rhythm.      Heart sounds: Normal heart sounds. No murmur heard.  Pulmonary:      Effort: Pulmonary effort is normal. No respiratory distress.      Breath sounds: Normal breath sounds. No wheezing.   Abdominal:      General: Abdomen is flat. Bowel sounds are normal. There is no distension.      Palpations: Abdomen is soft.      Tenderness: There is no abdominal tenderness.   Musculoskeletal:      Left lower leg: No edema.      Comments: Wound vac in place to right ankle and ACE bandage overlying wound vac.    Skin:     General: Skin is warm.      Findings: No erythema.   Neurological:      Mental Status: He is alert and oriented to person, place, and time.   Psychiatric:         Mood and Affect: Mood normal.         Behavior: Behavior normal. Behavior is cooperative.         Thought Content: Thought content normal.         Judgment: Judgment normal.             Significant Labs: CBC:   Recent Labs   Lab 09/23/24  0314 09/24/24  0257   WBC 9.67 9.96   HGB 8.4* 8.3*   HCT 26.6* 25.5*   * 489*     CMP:   Recent Labs   Lab 09/23/24  0314 09/24/24  0257    139   K 3.7 3.3*    108   CO2 23 20*   * 70   BUN 19 20   CREATININE 0.9 1.0   CALCIUM 7.9* 7.9*   PROT 6.0 6.2   ALBUMIN 1.6* 1.7*   BILITOT 0.3 0.3   ALKPHOS 162* 156*   AST 17 18   ALT 5* 5*   ANIONGAP 11 11     Blood Sugars (AccuCheck):    Recent Labs     09/23/24  1354 09/23/24  1535 09/23/24  2103 09/24/24  0826 09/24/24  0949 09/24/24  1141   POCTGLUCOSE 175* 181* 95 77 82 88     Significant Imaging: I have reviewed all pertinent imaging results/findings within the past 24 hours.

## 2024-09-24 NOTE — PROGRESS NOTES
[facility]  Orthopedics  Progress Note    Attg Note:  Patient seen and examined.  I agree with the resident's assessment and plan.  To OR for washout and attempt at closure left forearm.  The risks, benefits and alternatives to surgery were discussed with the patient at great length.  These include bleeding, infection, vessel/nerve damage, pain, numbness, tingling, complex regional pain syndrome, hardware/surgical failure, need for further surgery, inability to close wounds, DVT, PE, arthritis and death.  Patient states an understanding and wishes to proceed with surgery.   All questions were answered.  No guarantees were implied or stated.  Informed consent was obtained.      Moe Liz MD      Patient Name: Cortes Schroeder  MRN: [id number]  Admission Date: 9/6/2024  Hospital Length of Stay: 18 days  Attending Provider: Raisa Kearns MD  Primary Care Provider: Andrew Sinclair Jr., MD  Follow-up For: Procedure(s) (LRB):  REPLACEMENT, WOUND VAC; white & black sponge (Right)  REMOVAL, HARDWARE, ANKLE (Right)    Post-Operative Day: 1 Day Post-Op  Subjective:     Principal Problem:Surgical wound breakdown    Principal Orthopedic Problem: s/p I&D and wound vac placement on 09/06, 09/20    Interval History: No issues overnight. POD1 from repeat I&D with hardware removal. Patient's pain remains well controlled. Updated on plan for plastics flap tomorrow. NPO at midnight.       Review of patient's allergies indicates:   Allergen Reactions    Invokana [canagliflozin] Anaphylaxis    Percocet [oxycodone-acetaminophen] Nausea Only and Hallucinations    Biaxin [clarithromycin]     Hydrocodone Other (See Comments)     Dizzy/nausea/hallucinations    Sulfa (sulfonamide antibiotics) Nausea Only and Rash       Current Facility-Administered Medications   Medication    acetaminophen tablet 1,000 mg    albuterol-ipratropium 2.5 mg-0.5 mg/3 mL nebulizer solution 3 mL    amLODIPine tablet 10 mg    calcium carbonate  "200 mg calcium (500 mg) chewable tablet 1,000 mg    dextrose 10% bolus 125 mL 125 mL    dextrose 10% bolus 125 mL 125 mL    dextrose 10% bolus 125 mL 125 mL    dextrose 10% bolus 250 mL 250 mL    dextrose 10% bolus 250 mL 250 mL    dextrose 10% bolus 250 mL 250 mL    enoxaparin injection 40 mg    fentaNYL 50 mcg/mL injection 25 mcg    glucagon (human recombinant) injection 1 mg    glucagon (human recombinant) injection 1 mg    glucose chewable tablet 16 g    glucose chewable tablet 24 g    hydrALAZINE tablet 25 mg    insulin aspart U-100 pen 0-10 Units    insulin aspart U-100 pen 2-8 Units    insulin glargine U-100 (Lantus) pen 16 Units    losartan tablet 50 mg    meperidine injection 12.5 mg    methocarbamoL tablet 500 mg    morphine injection 2 mg    morphine injection 4 mg    nortriptyline capsule 50 mg    ondansetron disintegrating tablet 8 mg    oxacillin 12 g in  mL CONTINUOUS INFUSION    pantoprazole EC tablet 40 mg    polyethylene glycol packet 17 g    prochlorperazine injection Soln 5 mg    senna tablet 8.6 mg    sucralfate 100 mg/mL suspension 1 g    vitamin D 1000 units tablet 1,000 Units     Objective:     Vital Signs (Most Recent):  Temp: 98 °F (36.7 °C) (09/24/24 0934)  Pulse: 90 (09/24/24 0943)  Resp: 16 (09/24/24 0934)  BP: (!) 153/76 (09/24/24 0934)  SpO2: 95 % (09/24/24 0934) Vital Signs (24h Range):  Temp:  [97.7 °F (36.5 °C)-98.7 °F (37.1 °C)] 98 °F (36.7 °C)  Pulse:  [86-98] 90  Resp:  [16-27] 16  SpO2:  [94 %-98 %] 95 %  BP: (139-180)/(70-86) 153/76     Weight: 93 kg (205 lb 0.4 oz)  Height: 5' 5" (165.1 cm)  Body mass index is 34.12 kg/m².      Intake/Output Summary (Last 24 hours) at 9/24/2024 1035  Last data filed at 9/24/2024 0959  Gross per 24 hour   Intake 690 ml   Output 1850 ml   Net -1160 ml          Ortho/SPM Exam     AAOx4  NAD  Tachycardic  No increased WOB    RLE  Medial wound vac to good suction  Lateral side with fibrinous exudate and weeping ecchymosis " distally  Compartments soft/compressible  Sensation diminished (at baseline)   Active toe dorsiflexion & plantarflexion  WWP. Brisk cap refill      Significant Labs:     Recent Labs   Lab 09/24/24  0257   WBC 9.96   RBC 2.93*   HGB 8.3*   HCT 25.5*   *   MCV 87   MCH 28.3   MCHC 32.5         Significant Imaging: I have reviewed and interpreted all pertinent imaging results/findings.    Assessment/Plan:     * Surgical wound breakdown  Cortes Schroeder is a 64 y.o. male with PMH of DM, HTN  and right trimalleolar ankle fracture status post ex fix on 07/18 with subsequent definitive fixation on 07/26 admitted with right ankle wound dehiscence in the setting of uncontrolled diabetes.      He is s/p I&D of R ankle 09/06. Repeat I&D R medial ankle 09/09. 9/17 angiography and medial ankle wound vac exchange.   To OR 9/20 and 9/23 for repeat I&D and vac exchange. Hardware removed on 9/23.   Propeller flap with plastics 9/25.     Diet: NPO at midnight   Pain control: multimodal regimen  PT/OT:  NWB RLE   DVT PPx: Lvx   Abx:  oxacillin continuous; ID following   Cultures:  ankle cx staph +    Dispo: OR with plastics Wednesday 9/25          Artur Galvan MD  Orthopedics  [facility] - Surgery

## 2024-09-24 NOTE — PROGRESS NOTES
[facility] Medicine  Progress Note    Patient Name: Cortes Schroeder  MRN: [id number]  Patient Class: IP- Inpatient   Admission Date: 9/6/2024  Length of Stay: 17 days  Attending Physician: Raisa Kearns MD  Primary Care Provider: Andrew Sinclair Jr., MD        Subjective:     Principal Problem:Surgical wound breakdown        HPI:  Cortes Schroeder is a 64 y.o. M with PMHx of anxiety, T2DM, GERD, HLD, HTN who presented to ED for AMS. He had a fall in July that resulted in R trimalleolar fracture and L distal radius fracture. He had external fixation on 07/18 and then ORIF on 07/26 with Dr. Liz. He was prescribed clinda 300 mg on 08/28 for a ten day course. Patient was doing well when he acutely became altered and not acting like himself a few hours ago. Patient family member drove him here from [facility] for ortho consult. R medial ankle wound dehisced with redness and swelling around it. No CPM fever, cough, N/V/D or seizure.    In ED: T max 99.1. SIRS 2/4 with WBC 18 and . CMP notable for Na 127, Cr 1.7, . Phos 1.9. .3. BNP 73. Trop neg. A1c 8.5. R tib fib XR notes There are 2 abandoned anterior-posteriorly oriented pin tracks in the right mid tibial shaft.  On AP views, each of these pin tracks demonstrates a small faint internal density, possibly representing a sequestrum. Ortho and endocrine consulted. Given IV vanc and IV clindamycin. Given 2.7L IVFs. Admitted to hospital medicine for sepsis 2/2 infected hardware.     Overview/Hospital Course:  Cortes Schroeder is a 65 y/o male admitted to hospital medicine for sepsis related to right ankle from surgical wound infection in patient with recent ORIF of right ankle fracture done on 7/26/2024. Patient started on empiric IV Vancomycin and IV Cefepime and given IVF's as per sepsis protocol. Orthopedics consulted and patient taken to OR on 9/6/2024 and had irrigation debridement of right surgical incision and  placement of wound vac.Endocrine consulted to assist in management of his diabetes while in hospital. Blood cultures from admit returned with MSSA. Infectious Disease consulted. Wound culture returned positive for Group B streptococcus and MSSA. Echo done due to bacteremia without concern for vegetations. ID changed antibiotics to IV Oxacillin. Patient with new cough and worsening WBC. CTA chest negative for PE but notes small area of ground glass changes to RUL. Patient placed on 5 day course of po Doxycycline to treat as per ID and completed for possible pneumonia. Patient taken back to OR on 09/09/2024 with Ortho and had another irrigation and debridement and replacement of wound vac to right ankle. Plastics consulted for flap evaluation and recommended vascular evaluation. CTA right lower extremity done and showed moderate atherosclerosis and multifocal high grade stenosis throughout right calf arteries. Dietary consulted for malnutrition and started on Boost supplementation to maximize his nutrition for a flap and wound healing. Vascular surgery consulted per Plastics recs as they would like to pre-optimize blood flow prior to any free flap or pedicled propellar flap. Vascular recommended angiogram of right leg but patient developed DEEPALI. Nephrology consulted and suspected ATN related to contrast nephropathy. Patient placed on supportive care. DEEPALI resolved. Patyoanetn taken for unilateral angiogram by Vascular on 9/17 and underwent PTA of right posterior tibial artery and right anterior tibial artery. After revascularization of right leg pl;an to do flap but working on timing with Ortho and Plastics. Patient to go back to OR again on 9/20 for another irrigation and debridement and replacement of wound vac to right ankle wound by Ortho. Plastics plans propeller flap to right ankle on 9/25 and then will need to remain in hospital for 1 week after flap for dangling protocol and monitoring of flap per Plastics. Repeat  blood cultures from 9/8 and 9/10 no growth. Patient taken back to OR on 9/20 with Dr. Moe Liz and had another I&D of right ankle and wound and replacement of wound vac. Patient to remain non weight bearing post-op and Plastics plans flap placement while in hospital on 9/25. Patient to go back to OR on for another I&D and wound vac change on 9/23.     Interval History: Patient taken back to OR with OR today and had another I&D and wound vac change with Orthopedics. Ortho noted anterolateral foot eschar developing. Distal lateral wound breakdown and ortho opened up majority of lateral wound and excised surrounding skin and subcutaneous tissue. Ortho removed all ankle hardware. Wound vacs placed medially and laterally. Patient's appetite remain poor and states just does not like our food and eating pudding and cereals. He stated his friend/roommate Juan coming tomorrow and asked if he could bring him food like wings and I told him yes as we need to try and get some nutrition/protein to help with his wound healing. Patient reports 3/10 pain to right ankle currently. Patient scheduled to go back to OR on 9/25 with Plastics for propeller flap. Labs reviewed. Blood sugars stable. Hgb improved to 8.4 today from 7.6 yesterday. Creatinine normal at 0.9. WBC normal.     Review of Systems   Constitutional:  Negative for fever.   Respiratory:  Negative for cough and shortness of breath.    Cardiovascular:  Negative for chest pain.   Gastrointestinal:  Negative for nausea and vomiting.   Musculoskeletal:  Positive for arthralgias (Right ankle).   Psychiatric/Behavioral:  Negative for agitation and confusion.      Objective:     Vital Signs (Most Recent):  Temp: 98 °F (36.7 °C) (09/23/24 1928)  Pulse: 89 (09/23/24 1928)  Resp: 18 (09/23/24 1928)  BP: 152/70 (09/23/24 1928)  SpO2: 95 % (09/23/24 1928) on room air Vital Signs (24h Range):  Temp:  [97.7 °F (36.5 °C)-99 °F (37.2 °C)] 98 °F (36.7 °C)  Pulse:  [86-99] 89  Resp:   [17-27] 18  SpO2:  [94 %-100 %] 95 %  BP: (139-185)/(64-87) 152/70     Weight: 93 kg (205 lb 0.4 oz)  Body mass index is 34.12 kg/m².    Intake/Output Summary (Last 24 hours) at 9/23/2024 1936  Last data filed at 9/23/2024 1800  Gross per 24 hour   Intake 200 ml   Output 975 ml   Net -775 ml         Physical Exam  Vitals and nursing note reviewed.   Constitutional:       General: He is awake. He is not in acute distress.     Appearance: Normal appearance. He is well-developed. He is obese.      Comments: Patient sitting up in bed in no distress and in no apparent pain. Patient in good spirits.    Eyes:      Conjunctiva/sclera: Conjunctivae normal.   Cardiovascular:      Rate and Rhythm: Normal rate and regular rhythm.      Heart sounds: Normal heart sounds. No murmur heard.  Pulmonary:      Effort: Pulmonary effort is normal. No respiratory distress.      Breath sounds: Normal breath sounds. No wheezing.   Abdominal:      General: Abdomen is flat. Bowel sounds are normal. There is no distension.      Palpations: Abdomen is soft.      Tenderness: There is no abdominal tenderness.   Musculoskeletal:      Left lower leg: No edema.      Comments: Wound vac in place to right ankle and ACE bandage overlying wound vac.    Skin:     General: Skin is warm.      Findings: No erythema.   Neurological:      Mental Status: He is alert and oriented to person, place, and time.   Psychiatric:         Mood and Affect: Mood normal.         Behavior: Behavior normal. Behavior is cooperative.         Thought Content: Thought content normal.         Judgment: Judgment normal.             Significant Labs: CBC:   Recent Labs   Lab 09/22/24 0215 09/23/24 0314   WBC 7.71 9.67   HGB 7.6* 8.4*   HCT 24.8* 26.6*   * 537*     CMP:   Recent Labs   Lab 09/22/24 0215 09/23/24 0314    140   K 3.7 3.7    106   CO2 25 23   * 124*   BUN 20 19   CREATININE 1.0 0.9   CALCIUM 7.9* 7.9*   PROT 5.9* 6.0   ALBUMIN 1.6* 1.6*    BILITOT 0.3 0.3   ALKPHOS 164* 162*   AST 17 17   ALT <5* 5*   ANIONGAP 8 11     POCT Glucose:   Recent Labs   Lab 09/23/24  1212 09/23/24  1354 09/23/24  1535   POCTGLUCOSE 164* 175* 181*       Significant Imaging: I have reviewed all pertinent imaging results/findings within the past 24 hours.    Assessment/Plan:      * Surgical wound breakdown  Closed trimalleolar fracture of right ankle s/p ORIF in 7/2024  Surgical site infection  63 yo male presenting with confusion and worsening redness, swelling and pain to right ankle. Patient with sepsis criteria on admit and right ankle wound felt to be source of infection.   - Ortho consulted and patient taken to OR 9/06/2024 for debridement of surgical wound with wound vac placed. Cultures taken and grew both MSSA and Group B streptococcus from wound. Patient taken back again to OR with Ortho with Dr. Liz and patient had another I&Dand wound vac change on 9/9/2024. Orthopedics took back to OR again on 9/20/2024 with Dr. Liz and underwent another I&D of right ankle and wound and replacement of wound vac. Patient taken back to OR with OR again on 9/23 with Dr. Liz and had another I&D and wound vac change with Orthopedics. Ortho noted anterolateral foot eschar developing. Distal lateral wound breakdown and ortho opened up majority of lateral wound and excised surrounding skin and subcutaneous tissue. Ortho removed all ankle hardware. Wound vacs placed medially and laterally.   - Orthopedics recommended Plastics evaluation for free flap and as part of evaluation recommended Vascular evaluation. Vascular evaluated patient and angiogram of right leg done and PTA done and revascularization of right PT/AT. Plastics plan propeller flap on 9/25/2024. Per Plastics will need at least a week in hospital after flap for dangle protocols.  - Wound cultures -- MSSA and Group B strep from right ankle.   - Blood cultures -- MSSA on admit but repeat blood cultures negative.    - ID consulted and following and appreciate recs:  - Continue IV Oxacillin 12 g every 24 hours continuous infusion. Plan for orals if hardware retained once completed.  - Anticipate 6 weeks of IV antibiotics from date of most recent washout.  - Continue PT/OT and non weight bearing to right lower extremity as per Ortho. Continue wound vac as per Ortho to surgical site.  - Pain controlled and continue multimodals.   - Continue Lovenox 40 mg subcutaneous daily for DVT prophylaxis.     MSSA bacteremia  - Blood cultures from admit grew MSSA. Repeat blood cultures done on 9/8 and 9/10 no growth.  - Infectious Disease consulted and patient placed on IV Oxacillin infusion as suspected source was right ankle surgical wound infection for bacteremia as grew MSSA from right ankle wound.   - Echo done without concern for vegetations.    Uncontrolled type 2 diabetes mellitus with hyperglycemia  Patient's FSGs are improving on current hypoglycemics. Endocrine consulted and managing patient's diabetes in hospital and appreciate assistance. Patient on insulin pump at [facility] but not in hospital and on basal/prandial insulin in hospital as per Endocrine. Appreciate Endocrine assistance on this case.   Last A1c reviewed-   Lab Results   Component Value Date    LABA1C 10.8 (H) 08/15/2016    HGBA1C 8.5 (H) 09/06/2024     Most recent fingerstick glucose reviewed-   Recent Labs   Lab 09/22/24  0731 09/22/24  0732 09/22/24  1057 09/22/24  1553   POCTGLUCOSE 37* 115* 183* 107       Current correctional scale  Low  Maintain anti-hyperglycemic dose as follows-   Antihyperglycemics (From admission, onward)      Start     Stop Route Frequency Ordered    09/22/24 0900  insulin glargine U-100 (Lantus) pen 22 Units         -- SubQ Daily 09/22/24 0842    09/20/24 1524  insulin aspart U-100 pen 0-10 Units         -- SubQ Before meals & nightly PRN 09/20/24 1424    09/20/24 1130  insulin aspart U-100 pen 2-8 Units         -- SubQ 3 times daily with  meals 09/20/24 0844           - Endocrine consulted:  - At this time, recommend that the patient remain off of his pump since he cannot be controlled in the Auto mode. Patient does not interact with pump frequently and relies on the auto mode algorithm.   - Insulin dosing as per Endocrine recommendations.   - Hold oral antihyperglycemics during hospitalization   - Monitor blood sugars with meals and at bedtime in hospital.  - Target blood sugars 140-180 in hospital.  - Diabetic diet.     Gastroesophageal reflux disease without esophagitis  Controlled. Continue Carafate every 6 hours po to treat.       Airway malacia  Noted on CT scan of chest. Patient with no acute issues and incidentally note don CT scan of chest. Patient asymptomatic.       Acute blood loss anemia  Improving. Anemia is likely due to acute blood loss which was from surgery. Most recent hemoglobin and hematocrit are listed below.  Recent Labs     09/21/24  0816 09/22/24  0215 09/23/24  0314   HGB 8.4* 7.6* 8.4*   HCT 25.3* 24.8* 26.6*       Plan  - Monitor serial CBC: Daily  - Transfuse PRBC if patient becomes hemodynamically unstable, symptomatic or H/H drops below 7/21.  - Patient has not received any PRBC transfusions to date  - Patient's anemia is currently improving and monitor. No indication for blood transfusion at this time.     PAD (peripheral artery disease)  Present on admit. Patient noted to have high grade stenosis on CTA of right leg. Vascular surgery consulted and patient taken to OR and had unilateral angiogram of right leg and had PT/AT artery stenosis on 9/17 and underwent PTA of right PT/AT arteries. Appreciate Vascular surgery assistance on case.       Thrombocytosis  Present on admit and related to infection causing increase in platelet count. Monitor daily CBC.       On pre-exposure prophylaxis for HIV  Patient on Truvada as outpatient but held in hospital due to elevated AST and ALT that has resolved. Plan to resume Truvada on  discharge.     Class 1 obesity due to excess calories with serious comorbidity and body mass index (BMI) of 34.0 to 34.9 in adult  Body mass index is 34.12 kg/m². Morbid obesity complicates all aspects of disease management from diagnostic modalities to treatment. Weight loss encouraged and health benefits explained to patient.         VTE Risk Mitigation (From admission, onward)           Ordered     enoxaparin injection 40 mg  Daily         09/18/24 0808     IP VTE HIGH RISK PATIENT  Once         09/06/24 4174                    Discharge Planning   JAYLEEN: 10/2/2024     Code Status: Full Code   Is the patient medically ready for discharge?:     Reason for patient still in hospital (select all that apply): Patient trending condition  Discharge Plan A: Post acute facility       Raisa Kearns MD  Department of Hospital Medicine   [facility] - Surgery

## 2024-09-24 NOTE — PROGRESS NOTES
"[facility]  Endocrinology  Progress Note    Admit Date: 2024     Reason for Consult: Management of T2DM, Hyperglycemia      Surgical Procedure and Date: S/P irrigation debridement of RLE on 2024    Diabetes diagnosis year:      Home Diabetes Medications:    Humalog via OmniPod insulin pump  Mounjaro 10 mg weekly     Current pump settings:  Basal 12 am to 2.3 and 6 am to 1.55  ICR to 3 at 12 am, 2 at 4 pm  ISF to 1:20   AIT 3 hrs  Target 110-120        Patient had anaphylaxis reaction to SGLT2 inhibitors specifically Invokana     How often checking glucose at home? Dexcom G6   BG readings on regimen: Average 210's per Dexcom  Hypoglycemia on the regimen?  Yes  Missed doses on regimen?  No     Diabetes Complications include:     Hyperglycemia and Diabetic peripheral neuropathy         Complicating diabetes co morbidities:   HLD, HTN, Obesity         HPI: 64 y.o. male presents to the ED w/ complaint of altered mental status. Hx of R ankle fracture with surgery over a month ago. Patient now presents with sepsis 2/2 R ankle infection s/p ORIF on . Started on IV vanc and cefepime. Given IVFs. Blood cultures pending. Brought to OR with ortho on  for irrigation debridement of RLE. Endocrine following for BG and type 2 diabetes.     Lab Results   Component Value Date    LABA1C 10.8 (H) 08/15/2016    HGBA1C 8.5 (H) 2024         Interval HPI:   No acute events overnight. Patient in room 542/542 A. Blood glucose stable. BG at and below goal on current insulin regimen (SSI, prandial, and basal insulin ). Steroid use- None.   1 Day Post-Op  Renal function-   Lab Results   Component Value Date    CREATININE 1.0 2024        Vasopressors-  None     Diet diabetic  Calorie     Eatin%  Nausea: No  Hypoglycemia and intervention: No  Fever: No  TPN and/or TF: No    BP (!) 156/73 (Patient Position: Lying)   Pulse 92   Temp 98.1 °F (36.7 °C) (Oral)   Resp 18   Ht 5' 5" (1.651 m)   " "Wt 93 kg (205 lb 0.4 oz)   SpO2 (!) 94%   BMI 34.12 kg/m²     Labs Reviewed and Include    Recent Labs   Lab 09/24/24  0257   GLU 70   CALCIUM 7.9*   ALBUMIN 1.7*   PROT 6.2      K 3.3*   CO2 20*      BUN 20   CREATININE 1.0   ALKPHOS 156*   ALT 5*   AST 18   BILITOT 0.3     Lab Results   Component Value Date    WBC 9.96 09/24/2024    HGB 8.3 (L) 09/24/2024    HCT 25.5 (L) 09/24/2024    MCV 87 09/24/2024     (H) 09/24/2024     No results for input(s): "TSH", "FREET4" in the last 168 hours.  Lab Results   Component Value Date    HGBA1C 8.5 (H) 09/06/2024       Nutritional status:   Body mass index is 34.12 kg/m².  Lab Results   Component Value Date    ALBUMIN 1.7 (L) 09/24/2024    ALBUMIN 1.6 (L) 09/23/2024    ALBUMIN 1.6 (L) 09/22/2024     Lab Results   Component Value Date    PREALBUMIN 3 (L) 09/06/2024    PREALBUMIN 13 (L) 07/18/2024       Estimated Creatinine Clearance: 79.2 mL/min (based on SCr of 1 mg/dL).    Accu-Checks  Recent Labs     09/22/24  0731 09/22/24  0732 09/22/24  1057 09/22/24  1553 09/22/24  2122 09/23/24  0710 09/23/24  1212 09/23/24  1354 09/23/24  1535 09/23/24  2103   POCTGLUCOSE 37* 115* 183* 107 115* 152* 164* 175* 181* 95       Current Medications and/or Treatments Impacting Glycemic Control  Immunotherapy:    Immunosuppressants       None          Steroids:   Hormones (From admission, onward)      None          Pressors:    Autonomic Drugs (From admission, onward)      None          Hyperglycemia/Diabetes Medications:   Antihyperglycemics (From admission, onward)      Start     Stop Route Frequency Ordered    09/24/24 0900  insulin glargine U-100 (Lantus) pen 16 Units         -- SubQ Daily 09/24/24 0855    09/20/24 1524  insulin aspart U-100 pen 0-10 Units         -- SubQ Before meals & nightly PRN 09/20/24 1424    09/20/24 1130  insulin aspart U-100 pen 2-8 Units         -- SubQ 3 times daily with meals 09/20/24 0844            ASSESSMENT and " PLAN    Endocrine  Uncontrolled type 2 diabetes mellitus with hyperglycemia  BG goal 140-180    - Lantus (Insulin Glargine) 16 units daily (30% decrease due to fasting BG below goal)   - Novolog 2-8 with meals (Administer   2   units if patient eats 25% of meal, administer 4 units if the patient eats 50% of meal, administer 6 units if patient eats 75% of meal, and administer 8 units if the patient eats 100% of meal.) (Hold while NPO)  - [facility] SSI (150/25)  - BG checks AC/HS  - Hypoglycemia protocol in place    ** Please notify Endocrine for any change and/or advance in diet**  ** Please call Endocrine for any BG related issues **    Discharge Planning:   TBD. Please notify endocrinology prior to discharge.        Orthopedic  * Surgical wound breakdown  Optimize BG control to improve wound healing  Managed per primary team              Payton Vora PA-C  Endocrinology  [facility]

## 2024-09-24 NOTE — SUBJECTIVE & OBJECTIVE
Interval History: Patient taken back to OR with OR today and had another I&D and wound vac change with Orthopedics. Ortho noted anterolateral foot eschar developing. Distal lateral wound breakdown and ortho opened up majority of lateral wound and excised surrounding skin and subcutaneous tissue. Ortho removed all ankle hardware. Wound vacs placed medially and laterally. Patient's appetite remain poor and states just does not like our food and eating pudding and cereals. He stated his friend/roommate Juan coming tomorrow and asked if he could bring him food like wings and I told him yes as we need to try and get some nutrition/protein to help with his wound healing. Patient reports 3/10 pain to right ankle currently. Patient scheduled to go back to OR on 9/25 with Plastics for propeller flap. Labs reviewed. Blood sugars stable. Hgb improved to 8.4 today from 7.6 yesterday. Creatinine normal at 0.9. WBC normal.     Review of Systems   Constitutional:  Negative for fever.   Respiratory:  Negative for cough and shortness of breath.    Cardiovascular:  Negative for chest pain.   Gastrointestinal:  Negative for nausea and vomiting.   Musculoskeletal:  Positive for arthralgias (Right ankle).   Psychiatric/Behavioral:  Negative for agitation and confusion.      Objective:     Vital Signs (Most Recent):  Temp: 98 °F (36.7 °C) (09/23/24 1928)  Pulse: 89 (09/23/24 1928)  Resp: 18 (09/23/24 1928)  BP: 152/70 (09/23/24 1928)  SpO2: 95 % (09/23/24 1928) on room air Vital Signs (24h Range):  Temp:  [97.7 °F (36.5 °C)-99 °F (37.2 °C)] 98 °F (36.7 °C)  Pulse:  [86-99] 89  Resp:  [17-27] 18  SpO2:  [94 %-100 %] 95 %  BP: (139-185)/(64-87) 152/70     Weight: 93 kg (205 lb 0.4 oz)  Body mass index is 34.12 kg/m².    Intake/Output Summary (Last 24 hours) at 9/23/2024 1936  Last data filed at 9/23/2024 1800  Gross per 24 hour   Intake 200 ml   Output 975 ml   Net -775 ml         Physical Exam  Vitals and nursing note reviewed.    Constitutional:       General: He is awake. He is not in acute distress.     Appearance: Normal appearance. He is well-developed. He is obese.      Comments: Patient sitting up in bed in no distress and in no apparent pain. Patient in good spirits.    Eyes:      Conjunctiva/sclera: Conjunctivae normal.   Cardiovascular:      Rate and Rhythm: Normal rate and regular rhythm.      Heart sounds: Normal heart sounds. No murmur heard.  Pulmonary:      Effort: Pulmonary effort is normal. No respiratory distress.      Breath sounds: Normal breath sounds. No wheezing.   Abdominal:      General: Abdomen is flat. Bowel sounds are normal. There is no distension.      Palpations: Abdomen is soft.      Tenderness: There is no abdominal tenderness.   Musculoskeletal:      Left lower leg: No edema.      Comments: Wound vac in place to right ankle and ACE bandage overlying wound vac.    Skin:     General: Skin is warm.      Findings: No erythema.   Neurological:      Mental Status: He is alert and oriented to person, place, and time.   Psychiatric:         Mood and Affect: Mood normal.         Behavior: Behavior normal. Behavior is cooperative.         Thought Content: Thought content normal.         Judgment: Judgment normal.             Significant Labs: CBC:   Recent Labs   Lab 09/22/24  0215 09/23/24  0314   WBC 7.71 9.67   HGB 7.6* 8.4*   HCT 24.8* 26.6*   * 537*     CMP:   Recent Labs   Lab 09/22/24 0215 09/23/24  0314    140   K 3.7 3.7    106   CO2 25 23   * 124*   BUN 20 19   CREATININE 1.0 0.9   CALCIUM 7.9* 7.9*   PROT 5.9* 6.0   ALBUMIN 1.6* 1.6*   BILITOT 0.3 0.3   ALKPHOS 164* 162*   AST 17 17   ALT <5* 5*   ANIONGAP 8 11     POCT Glucose:   Recent Labs   Lab 09/23/24  1212 09/23/24  1354 09/23/24  1535   POCTGLUCOSE 164* 175* 181*       Significant Imaging: I have reviewed all pertinent imaging results/findings within the past 24 hours.

## 2024-09-24 NOTE — NURSING
Patient rested well, through the night, NADN. Bed in lowest position, call light in reach along with personal items. Plan of care ongoing.

## 2024-09-24 NOTE — ASSESSMENT & PLAN NOTE
Improving with treatment of infection. Present on admit and related to infection causing increase in platelet count. Monitor daily CBC.

## 2024-09-24 NOTE — ASSESSMENT & PLAN NOTE
Resolved. Improved with oral replacement. Potassium low at 3.4 on 9/21 and will replace with oral potassium. Patient's most recent potassium results are listed below.   Recent Labs     09/21/24  0332 09/22/24  0215 09/23/24  0314   K 3.4* 3.7 3.7       Plan  - Replete potassium per protocol  - Monitor potassium Daily

## 2024-09-24 NOTE — ASSESSMENT & PLAN NOTE
Controlled. Anemia is likely due to acute blood loss which was from surgery. Most recent hemoglobin and hematocrit are listed below.  Recent Labs     09/22/24  0215 09/23/24  0314 09/24/24  0257   HGB 7.6* 8.4* 8.3*   HCT 24.8* 26.6* 25.5*       Plan  - Monitor serial CBC: Daily  - Transfuse PRBC if patient becomes hemodynamically unstable, symptomatic or H/H drops below 7/21.  - Patient has not received any PRBC transfusions to date  - Patient's anemia is currently stable and monitor. No indication for blood transfusion at this time.

## 2024-09-24 NOTE — ASSESSMENT & PLAN NOTE
I have reviewed hospital notes from   service and other specialty providers. I have also reviewed CBC, CMP/BMP,  cultures and imaging with my interpretation as documented.      64M with poorly controlled DM (A1C 8.5), recent fall resulting in left wrist and right ankle fractures 07/18/24 s/p ex fix Rt ankle and ORIF left wrist 07/19, with subsequent ORIF right ankle on 7/26/24 with Dr. Liz -- now c/b MSSA deep surgical site infection of right ankle involving underlying hardware with associated MSSA bacteremia.        Blood cultures 9/6 and 9/7 + for MSSA.  Repeat blood cultures 9/8, 9/11 NGTD.  TTE negative.      S/p I&D on 09/06 and 9/9.  Surgical cxs +GBS, MSSA.   On 09/17 underwent angio and wound vac exchange.  S/p I&D with wound vac exchange 09/20 and 09/23. During washout 09/23, ortho removed all hardware, and noted evidence of ongoing poor wound healing with extensive wound breakdown 2/2 PVD, despite having balloon angio 09/17. There is possibility pt may require BKA if continues to have poor healing.     Pt maintained on oxacillin. Remains AF, VSS, wbc normalized, LFTs nml, Cr 1.0 (now stable at baseline). Plastics plans for flap closure 09/25.    Recommendations / Plan:  Continue IV oxacillin 12 g q 24 hours continuous infusion.     To complete abx duration of 6wks s/p last I&D 09/23, DON 11/04.  Recommend placement after hosp d/c for PT/OT, ADL assistance, wound care and IV abx.   Accepting facility to perform weekly labs (cbc, cmp, crp), and picc line dressing changes.  Facility to fax results to [facility] Fax Number: [phone].     -- ID will schedule pt for ID clinic f/u appt.  If ID appt not seen under patient's appointments tab, please call ID dept to schedule appt before pt discharge.   -- Discussed with ID staff and primary team.  -- ID will sign off at this time.

## 2024-09-24 NOTE — ASSESSMENT & PLAN NOTE
Improving. Anemia is likely due to acute blood loss which was from surgery. Most recent hemoglobin and hematocrit are listed below.  Recent Labs     09/21/24  0816 09/22/24  0215 09/23/24  0314   HGB 8.4* 7.6* 8.4*   HCT 25.3* 24.8* 26.6*       Plan  - Monitor serial CBC: Daily  - Transfuse PRBC if patient becomes hemodynamically unstable, symptomatic or H/H drops below 7/21.  - Patient has not received any PRBC transfusions to date  - Patient's anemia is currently improving and monitor. No indication for blood transfusion at this time.

## 2024-09-24 NOTE — ASSESSMENT & PLAN NOTE
BG goal 140-180    - Lantus (Insulin Glargine) 16 units daily (30% decrease due to fasting BG below goal)   - Novolog 2-8 with meals (Administer   2   units if patient eats 25% of meal, administer 4 units if the patient eats 50% of meal, administer 6 units if patient eats 75% of meal, and administer 8 units if the patient eats 100% of meal.) (Hold while NPO)  - [facility] SSI (150/25)  - BG checks AC/HS  - Hypoglycemia protocol in place    ** Please notify Endocrine for any change and/or advance in diet**  ** Please call Endocrine for any BG related issues **    Discharge Planning:   TBD. Please notify endocrinology prior to discharge.

## 2024-09-24 NOTE — ASSESSMENT & PLAN NOTE
Closed trimalleolar fracture of right ankle s/p ORIF in 7/2024  Surgical site infection  63 yo male presenting with confusion and worsening redness, swelling and pain to right ankle. Patient with sepsis criteria on admit and right ankle wound felt to be source of infection.   - Ortho consulted and patient taken to OR 9/06/2024 for debridement of surgical wound with wound vac placed. Cultures taken and grew both MSSA and Group B streptococcus from wound. Patient taken back again to OR with Ortho with Dr. Liz and patient had another I&Dand wound vac change on 9/9/2024. Orthopedics took back to OR again on 9/20/2024 with Dr. Liz and underwent another I&D of right ankle and wound and replacement of wound vac. Patient taken back to OR with OR again on 9/23 with Dr. Liz and had another I&D and wound vac change with Orthopedics. Ortho noted anterolateral foot eschar developing. Distal lateral wound breakdown and ortho opened up majority of lateral wound and excised surrounding skin and subcutaneous tissue. Ortho removed all ankle hardware. Wound vacs placed medially and laterally.   - Orthopedics recommended Plastics evaluation for free flap and as part of evaluation recommended Vascular evaluation. Vascular evaluated patient and angiogram of right leg done and PTA done and revascularization of right PT/AT. Plastics plan propeller flap on 9/25/2024. Per Plastics will need at least a week in hospital after flap for dangle protocols.  - Wound cultures -- MSSA and Group B strep from right ankle.   - Blood cultures -- MSSA on admit but repeat blood cultures negative.   - ID consulted and following and appreciate recs:  - Continue IV Oxacillin 12 g every 24 hours continuous infusion. Plan for orals if hardware retained once completed.  - Anticipate 6 weeks of IV antibiotics from date of most recent washout.  - Continue PT/OT and non weight bearing to right lower extremity as per Ortho. Continue wound vac as per  Ortho to surgical site.  - Pain controlled and continue multimodals.   - Continue Lovenox 40 mg subcutaneous daily for DVT prophylaxis.

## 2024-09-24 NOTE — SUBJECTIVE & OBJECTIVE
Principal Problem:Surgical wound breakdown    Principal Orthopedic Problem: s/p I&D and wound vac placement on 09/06, 09/20    Interval History: No issues overnight. POD1 from repeat I&D with hardware removal. Patient's pain remains well controlled. Updated on plan for plastics flap tomorrow. NPO at midnight.       Review of patient's allergies indicates:   Allergen Reactions    Invokana [canagliflozin] Anaphylaxis    Percocet [oxycodone-acetaminophen] Nausea Only and Hallucinations    Biaxin [clarithromycin]     Hydrocodone Other (See Comments)     Dizzy/nausea/hallucinations    Sulfa (sulfonamide antibiotics) Nausea Only and Rash       Current Facility-Administered Medications   Medication    acetaminophen tablet 1,000 mg    albuterol-ipratropium 2.5 mg-0.5 mg/3 mL nebulizer solution 3 mL    amLODIPine tablet 10 mg    calcium carbonate 200 mg calcium (500 mg) chewable tablet 1,000 mg    dextrose 10% bolus 125 mL 125 mL    dextrose 10% bolus 125 mL 125 mL    dextrose 10% bolus 125 mL 125 mL    dextrose 10% bolus 250 mL 250 mL    dextrose 10% bolus 250 mL 250 mL    dextrose 10% bolus 250 mL 250 mL    enoxaparin injection 40 mg    fentaNYL 50 mcg/mL injection 25 mcg    glucagon (human recombinant) injection 1 mg    glucagon (human recombinant) injection 1 mg    glucose chewable tablet 16 g    glucose chewable tablet 24 g    hydrALAZINE tablet 25 mg    insulin aspart U-100 pen 0-10 Units    insulin aspart U-100 pen 2-8 Units    insulin glargine U-100 (Lantus) pen 16 Units    losartan tablet 50 mg    meperidine injection 12.5 mg    methocarbamoL tablet 500 mg    morphine injection 2 mg    morphine injection 4 mg    nortriptyline capsule 50 mg    ondansetron disintegrating tablet 8 mg    oxacillin 12 g in  mL CONTINUOUS INFUSION    pantoprazole EC tablet 40 mg    polyethylene glycol packet 17 g    prochlorperazine injection Soln 5 mg    senna tablet 8.6 mg    sucralfate 100 mg/mL suspension 1 g    vitamin D 1000  "units tablet 1,000 Units     Objective:     Vital Signs (Most Recent):  Temp: 98 °F (36.7 °C) (09/24/24 0934)  Pulse: 90 (09/24/24 0943)  Resp: 16 (09/24/24 0934)  BP: (!) 153/76 (09/24/24 0934)  SpO2: 95 % (09/24/24 0934) Vital Signs (24h Range):  Temp:  [97.7 °F (36.5 °C)-98.7 °F (37.1 °C)] 98 °F (36.7 °C)  Pulse:  [86-98] 90  Resp:  [16-27] 16  SpO2:  [94 %-98 %] 95 %  BP: (139-180)/(70-86) 153/76     Weight: 93 kg (205 lb 0.4 oz)  Height: 5' 5" (165.1 cm)  Body mass index is 34.12 kg/m².      Intake/Output Summary (Last 24 hours) at 9/24/2024 1035  Last data filed at 9/24/2024 0959  Gross per 24 hour   Intake 690 ml   Output 1850 ml   Net -1160 ml          Ortho/SPM Exam     AAOx4  NAD  Tachycardic  No increased WOB    RLE  Medial wound vac to good suction  Lateral side with fibrinous exudate and weeping ecchymosis distally  Compartments soft/compressible  Sensation diminished (at baseline)   Active toe dorsiflexion & plantarflexion  WWP. Brisk cap refill      Significant Labs:     Recent Labs   Lab 09/24/24  0257   WBC 9.96   RBC 2.93*   HGB 8.3*   HCT 25.5*   *   MCV 87   MCH 28.3   MCHC 32.5         Significant Imaging: I have reviewed and interpreted all pertinent imaging results/findings.    "

## 2024-09-24 NOTE — PROGRESS NOTES
[facility]  Infectious Disease  Progress Note    Patient Name: Cortes Schroeder  MRN: [id number]  Admission Date: 9/6/2024  Length of Stay: 18 days  Attending Physician: Raisa Kearns MD  Primary Care Provider: Andrew Sinclair Jr., MD    Isolation Status: No active isolations  Assessment/Plan:      ID  Surgical site infection  I have reviewed hospital notes from   service and other specialty providers. I have also reviewed CBC, CMP/BMP,  cultures and imaging with my interpretation as documented.      64M with poorly controlled DM (A1C 8.5), recent fall resulting in left wrist and right ankle fractures 07/18/24 s/p ex fix Rt ankle and ORIF left wrist 07/19, with subsequent ORIF right ankle on 7/26/24 with Dr. Liz -- now c/b MSSA deep surgical site infection of right ankle involving underlying hardware with associated MSSA bacteremia.        Blood cultures 9/6 and 9/7 + for MSSA.  Repeat blood cultures 9/8, 9/11 NGTD.  TTE negative.      S/p I&D on 09/06 and 9/9.  Surgical cxs +GBS, MSSA.   On 09/17 underwent angio and wound vac exchange.  S/p I&D with wound vac exchange 09/20 and 09/23. During washout 09/23, ortho removed all hardware, and noted evidence of ongoing poor wound healing with extensive wound breakdown 2/2 PVD, despite having balloon angio 09/17. There is possibility pt may require BKA if continues to have poor healing.     Pt maintained on oxacillin. Remains AF, VSS, wbc normalized, LFTs nml, Cr 1.0 (now stable at baseline). Pt doing well post-op. Plastics plans for flap closure 09/25.    Recommendations / Plan:  Continue IV oxacillin 12 g q 24 hours continuous infusion.     To complete abx duration of 6wks s/p last I&D 09/23, DON 11/04.  Recommend placement after hosp d/c for PT/OT, ADL assistance, wound care and IV abx.   Accepting facility to perform weekly labs (cbc, cmp, crp), and picc line dressing changes.  Facility to fax results to [facility] Fax Number: [phone].      -- ID will schedule pt for ID clinic f/u appt.  If ID appt not seen under patient's appointments tab, please call ID dept to schedule appt before pt discharge.   -- Discussed with ID staff and primary team.  -- ID will sign off at this time.                Thank you for your consult. I will sign off. Please contact us if you have any additional questions.    Andrew Kearns PA-C  Infectious Disease  [facility]    Subjective:     Principal Problem:Surgical wound breakdown    HPI: Cortes Schroeder is a 64 year old with HTN, poorly controlled DM (A1C 8.5), recent history of syncopal episodes, and  right ankle fracture on 7/18/24 s/p ex fix with subsequent ORIF on 7/26/24 with Dr. Liz.  At outpatient Orthopedic follow up surgical wound was healing and sutures removed.  Since then patient  reportedly doing well until about 1 week ago when he noticed some drainage from his surgical incisions.  Over the past week he developed fevers, chills, and night sweats.  Yesterday he became confused/altered and was brought to ED.  In ED was hypotensive, tachycardic, WBC 18, .  Sepsis protocol initiated and started on clindamycin has been doing well postoperatively until around 1 week ago when he started noticing some drainage from his surgical incisions. He started to develop fevers, chills and night sweats over the past week. Yesterday afternoon, patient became altered and was brought to the ED by his roommate. Patient currently lives in [place]. Upon presentation to the ED, patient was tachycardic, hypotensive and afebrile. WBC of 18.68, .3. Sepsis protocol initiated. Patient was started on clindamycin and vancomycin.  Now on vancomycin and cefepime.       Orthopedics consulted.  Noted draining wound over the medial side of the right ankle as well as eschar formation over the lateral side of the ankle.  Right ankle and foot noted to be erythematous and edematous.  Pending surgical I&D today.            Interval History: Remains AF, VSS, wbc nml. Well maintained on Iv-oxacillin. Repeat bld cxs 09/8 remain NGTD. S/p hardware removal 09/23. Pt with ongoing poor healing, at risk for BKA. Plastics plans for closure tomorrow 09/25. Pt doing well post-op.     Review of Systems   Musculoskeletal:  Positive for arthralgias and myalgias.   Skin:  Positive for wound.   All other systems reviewed and are negative.    Objective:     Vital Signs (Most Recent):  Temp: 98.1 °F (36.7 °C) (09/24/24 0456)  Pulse: 92 (09/24/24 0456)  Resp: 18 (09/24/24 0456)  BP: (!) 156/73 (09/24/24 0456)  SpO2: (!) 94 % (09/24/24 0456) Vital Signs (24h Range):  Temp:  [97.7 °F (36.5 °C)-99 °F (37.2 °C)] 98.1 °F (36.7 °C)  Pulse:  [86-99] 92  Resp:  [17-27] 18  SpO2:  [94 %-100 %] 94 %  BP: (139-185)/(70-87) 156/73     Weight: 93 kg (205 lb 0.4 oz)  Body mass index is 34.12 kg/m².    Estimated Creatinine Clearance: 79.2 mL/min (based on SCr of 1 mg/dL).     Physical Exam  Vitals and nursing note reviewed.   Constitutional:       General: He is not in acute distress.     Appearance: Normal appearance. He is not toxic-appearing or diaphoretic.   HENT:      Nose: No congestion.      Mouth/Throat:      Pharynx: No oropharyngeal exudate.   Eyes:      General: No scleral icterus.     Conjunctiva/sclera: Conjunctivae normal.   Cardiovascular:      Rate and Rhythm: Normal rate and regular rhythm.      Heart sounds: No murmur heard.  Pulmonary:      Effort: Pulmonary effort is normal. No respiratory distress.      Breath sounds: No wheezing.   Abdominal:      General: Bowel sounds are normal. There is no distension.      Palpations: Abdomen is soft.      Tenderness: There is no abdominal tenderness.   Musculoskeletal:      Cervical back: Neck supple. No tenderness.      Comments: Right ankle with surgical dressings in place + wound vac    Left wrist - no signs of infection   Skin:     General: Skin is warm and dry.   Neurological:      Mental  Status: He is alert and oriented to person, place, and time.   Psychiatric:         Mood and Affect: Mood normal.         Behavior: Behavior normal.          Significant Labs: Blood Culture:   Recent Labs   Lab 09/06/24  0235 09/07/24  0910 09/08/24  1641 09/10/24  1711   LABBLOO Gram stain aer bottle: Gram positive cocci in clusters resembling Staph  Gram stain alicia bottle: Gram positive cocci in clusters resembling Staph  Results called to and read back by:Luciana Altamirano RN 09/06/2024  20:52  STAPHYLOCOCCUS AUREUS*  Gram stain aer bottle: Gram positive cocci in clusters resembling Staph  Gram stain alicia bottle: Gram positive cocci in clusters resembling Staph  Results called to and read back by:Luciana Altamirano RN 09/06/2024  20:55  STAPHYLOCOCCUS AUREUS  For susceptibility see order #[id number]  * Gram stain aer bottle: Gram positive cocci in clusters resembling Staph  Results called to and read back by: Roseanne Guevara RN 09/08/2024  14:07  Gram stain alicia bottle: Gram positive cocci in clusters resembling Staph  Positive results previously called 09/08/2024  STAPHYLOCOCCUS AUREUS  ID consult required at [facility].  For susceptibility see order #[id number]  *  Gram stain aer bottle: Gram positive cocci in clusters resembling Staph  Gram stain alicia bottle: Gram positive cocci in clusters resembling Staph  Results called to and read back by:Luciana Altamirano LPN 09/08/2024  03:59  STAPHYLOCOCCUS AUREUS  ID consult required at [facility].  For susceptibility see order #[id number]  * No growth after 5 days.  No growth after 5 days. No growth after 5 days.     CBC:   Recent Labs   Lab 09/23/24 0314 09/24/24  0257   WBC 9.67 9.96   HGB 8.4* 8.3*   HCT 26.6* 25.5*   * 489*     CMP:   Recent Labs   Lab 09/23/24  0314 09/24/24  0257    139   K 3.7 3.3*    108   CO2 23 20*   * 70   BUN 19 20   CREATININE 0.9 1.0    CALCIUM 7.9* 7.9*   PROT 6.0 6.2   ALBUMIN 1.6* 1.7*   BILITOT 0.3 0.3   ALKPHOS 162* 156*   AST 17 18   ALT 5* 5*   ANIONGAP 11 11     Microbiology Results (last 7 days)       Procedure Component Value Units Date/Time    Fungus culture [[id number]] Collected: 09/06/24 1446    Order Status: Completed Specimen: Incision site from Ankle, Right Updated: 09/23/24 0844     Fungus (Mycology) Culture Culture in progress      No fungus isolated after 2 weeks    Narrative:      Right ankle wound - culture          Wound Culture:   Recent Labs   Lab 09/06/24  1446   LABAERO STAPHYLOCOCCUS AUREUS  Many  *  STREPTOCOCCUS AGALACTIAE (GROUP B)  Many  Beta-hemolytic streptococci are routinely susceptible to   penicillins,cephalosporins and carbapenems.  *     All pertinent labs within the past 24 hours have been reviewed.    Significant Imaging: I have reviewed all pertinent imaging results/findings within the past 24 hours.    I have personally reviewed records / hospital notes from   service and other specialty providers. I have also reviewed CBC, CMP/BMP,  cultures and imaging with my interpretation as documented in my assessment / plan.    Patient is high risk for infectious complications given pt's age, multiple co-morbidities, and case complexity.      Time: 50 minutes   50% of time spent on face-to-face counseling and coordination of care. Counseling included review of test results, diagnosis, and treatment plan with patient and/or family.

## 2024-09-24 NOTE — PLAN OF CARE
Recommendations    Continue diabetic diet  Continue Boost Breeze & boost GC BID as tolerated  If pt not consuming all 4 protein shakes, discontinue least desired one (preferably boost breeze)  Continue Abhijit BID  If additional protein needed, add Prosource once daily for (16 grams of protein)   RD following    Goals: Meet % een/epn by next RD f/u  Nutrition Goal Status: progressing towards goal  Communication of RD Recs: other (comment) (poc)

## 2024-09-24 NOTE — PT/OT/SLP RE-EVAL
"Occupational Therapy   Re-evaluation    Name: Cortes Schroeder  MRN: [id number]  Admitting Diagnosis:  Surgical wound breakdown  Recent Surgery: Procedure(s) (LRB):  REPLACEMENT, WOUND VAC; white & black sponge (Right)  REMOVAL, HARDWARE, ANKLE (Right) 1 Day Post-Op    Recommendations:     Discharge Recommendations: Moderate Intensity Therapy  Discharge Equipment Recommendations: bedside commode  Barriers to discharge:  Decreased caregiver support    Assessment:     Cortes Schroeder is a 64 y.o. male with a medical diagnosis of Surgical wound breakdown.  He presents with the following performance deficits affecting function: weakness, impaired endurance, impaired self care skills, impaired functional mobility, impaired balance, gait instability, decreased lower extremity function, orthopedic precautions.  Pt displayed improved standing and balance and mobility needed for standing ADLs. Pt still requires some assistance from lower surfaces for safety.    Rehab Prognosis:  Good; patient would benefit from acute skilled OT services to address these deficits and reach maximum level of function.       Plan:     Patient to be seen 4 x/week to address the above listed problems via self-care/home management, therapeutic activities, therapeutic exercises  Plan of Care Expires: 10/18/24  Plan of Care Reviewed with: patient    Subjective     Chief Complaint: "I think I would like to get back in bed"  Patient/Family stated goals: return home  Communicated with: Henry prior to session.  Pain/Comfort:  Pain Rating 1: 0/10    Objective:     Communicated with: Henry prior to session.  Patient found up in chair with: telemetry, wound vac, peripheral IV upon OT entry to room.    General Precautions: Standard, fall  Orthopedic Precautions: RLE non weight bearing  Braces: N/A  Respiratory Status: Room air    Occupational Performance:    Bed Mobility:    Patient completed Sit to Supine with stand by assistance    Functional " Mobility/Transfers:  Patient completed Sit <> Stand Transfer with minimum assistance  with  rolling walker   Patient completed Bed <> Chair Transfer using Step/hop Transfer technique with contact guard assistance with rolling walker    Activities of Daily Living:  Feeding:  supervision drinking water    Cognitive/Visual Perceptual:  A&O x4    Physical Exam:  BUE WFL  Balance: Fair-Fair+    AMPAC 6 Click:  AMPAC Total Score: 19    Treatment & Education:  Pt educated on role and purpose of therapy  Pt educated on goal setting  Pt educated on benefits of OOB activity  Pt educated on self advocacy   Pt educated on NWB precautions     Patient left HOB elevated with all lines intact, call button in reach, and nsg notified    GOALS:   Multidisciplinary Problems       Occupational Therapy Goals          Problem: Occupational Therapy    Goal Priority Disciplines Outcome Interventions   Occupational Therapy Goal     OT, PT/OT Progressing    Description: Goals to be met by: 10/18/24     Patient will increase functional independence with ADLs by performing:    UE Dressing with Supervision.  LE Dressing with Supervision.  Grooming while seated at sink with Set-up Assistance.  Toileting from toilet with Stand-by Assistance for hygiene and clothing management.   All functional transfers performed with SBA                         History:     Past Medical History:   Diagnosis Date    Anxiety 12/18/2012    Back pain 12/18/2012    Cataract     Chronic pain syndrome 04/24/2016    Diabetes type 2, controlled 02/20/2016    Diabetic retinopathy     DM (diabetes mellitus) 12/18/2012    DM (diabetes mellitus), type 2, uncontrolled 11/16/2013    Gastroesophageal reflux disease without esophagitis 02/20/2016    Hyperlipidemia 12/18/2012    Insomnia 08/07/2014    Neuropathy 11/16/2013         Past Surgical History:   Procedure Laterality Date    ANGIOGRAPHY OF LOWER EXTREMITY Right 9/17/2024    Procedure: Angiogram Extremity Unilateral;   Surgeon: GINA Morton II, MD;  Location: [facility] OR [address] [place] [address];  Service: Vascular;  Laterality: Right;  Fluro time 19.5 min  mGy 260.49    ANKLE HARDWARE REMOVAL Right 9/23/2024    Procedure: REMOVAL, HARDWARE, ANKLE;  Surgeon: Moe Liz MD;  [facility];  Service: Orthopedics;  Laterality: Right;    APPLICATION OF WOUND VACUUM-ASSISTED CLOSURE DEVICE Right 9/6/2024    Procedure: APPLICATION, WOUND VAC;  Surgeon: Moe Liz MD;  [facility];  Service: Orthopedics;  Laterality: Right;    APPLICATION, EXTERNAL FIXATION DEVICE, FOR ANKLE FRACTURE Right 7/18/2024    Procedure: APPLICATION, EXTERNAL FIXATION DEVICE, FOR ANKLE FRACTURE;  Surgeon: Moe Liz MD;  Location: [facility] OR [address] [place] [address];  Service: Orthopedics;  Laterality: Right;    ARTHROTOMY OF ANKLE Right 9/9/2024    Procedure: ARTHROTOMY, ANKLE;  Surgeon: Moe Liz MD;  Location: [facility] OR [address] [place] [address];  Service: Orthopedics;  Laterality: Right;    BACK SURGERY  2003    Lumbar Spine    CATARACT EXTRACTION      CLOSED REDUCTION, FRACTURE, ANKLE, TRIMALLEOLAR Right 7/18/2024    Procedure: CLOSED REDUCTION, FRACTURE, ANKLE, TRIMALLEOLAR;  Surgeon: Moe Liz MD;  Location: [facility] OR [address] [place] [address];  Service: Orthopedics;  Laterality: Right;    EPIDURAL STEROID INJECTION N/A 1/16/2019    Procedure: Injection, Steroid, Epidural Cervical;  Surgeon: Andrew Sinclair Jr., MD;  Location: [facility] ENDO;  Service: Pain Management;  Laterality: N/A;  Cervical Epidural Steroid Injection C7-T1    11116    Arrive @ 1240    EPIDURAL STEROID INJECTION N/A 2/20/2019    Procedure: Injection, Steroid, Epidural;  Surgeon: Andrew Sinclair Jr., MD;  Location: [facility] ENDO;  Service: Pain Management;  Laterality: N/A;  Lumbar Epidural Steroid Injection L4-5    71979    Arrive @ 1215 (requests latest time)    FIXATION OF SYNDESMOSIS OF ANKLE Right 7/26/2024    Procedure: FIXATION, SYNDESMOSIS, ANKLE;  Surgeon: Moe Liz MD;   Location: [facility] OR [facility] FLR;  Service: Orthopedics;  Laterality: Right;    INJECTION, SPINE, LUMBOSACRAL, TRANSFORAMINAL APPROACH Bilateral 6/14/2024    Procedure: Bilateral L5 Transforaminal Epidural Steroid Injections;  Surgeon: Andrew Sinclair Jr., MD;  Location: [facility] PAIN MANAGEMENT;  Service: Pain Management;  Laterality: Bilateral;  @1300(given)  ASA last 6/8  Check BG  MD Sign.    IRRIGATION AND DEBRIDEMENT Right 9/6/2024    Procedure: IRRIGATION AND DEBRIDEMENT;  Surgeon: Moe Liz MD;  [facility];  Service: Orthopedics;  Laterality: Right;    IRRIGATION AND DEBRIDEMENT Right 9/20/2024    Procedure: IRRIGATION AND DEBRIDEMENT;  Surgeon: Moe Liz MD;  [facility];  Service: Orthopedics;  Laterality: Right;    IRRIGATION AND DEBRIDEMENT OF LOWER EXTREMITY Right 9/9/2024    Procedure: IRRIGATION AND DEBRIDEMENT, LOWER EXTREMITY - WITH WOUND VAC CHANGE, RIGHT ANKLE;  Surgeon: Moe Liz MD;  Location: [facility] OR [address] [place] [address];  Service: Orthopedics;  Laterality: Right;  WITH WOUND VAC CHANGE, RIGHT ANKLE    OPEN REDUCTION AND INTERNAL FIXATION (ORIF) OF FRACTURE OF DISTAL RADIUS Left 7/19/2024    Procedure: ORIF, FRACTURE, RADIUS, DISTAL - LEFT;  Surgeon: Moe Liz MD;  Location: [facility] OR [address] [place] [address];  Service: Orthopedics;  Laterality: Left;  LEFT, SYNTHES, C ARM    OPEN REDUCTION AND INTERNAL FIXATION (ORIF) OF INJURY OF ANKLE Right 7/26/2024    Procedure: ORIF, ANKLE;  Surgeon: Moe Liz MD;  [address];  Service: Orthopedics;  Laterality: Right;    REMOVAL OF EXTERNAL FIXATION DEVICE Right 7/26/2024    Procedure: REMOVAL, EXTERNAL FIXATION DEVICE;  Surgeon: Moe Liz MD;  [facility];  Service: Orthopedics;  Laterality: Right;    REPLACEMENT OF WOUND VACUUM-ASSISTED CLOSURE DEVICE Right 9/17/2024    Procedure: REPLACEMENT, WOUND VAC;  Surgeon: Moe Liz MD;  [facility];  Service:  Orthopedics;  Laterality: Right;  wound vac dressing exchange    REPLACEMENT OF WOUND VACUUM-ASSISTED CLOSURE DEVICE Right 9/20/2024    Procedure: REPLACEMENT, WOUND VAC;  Surgeon: Moe Liz MD;  Location: [address];  Service: Orthopedics;  Laterality: Right;    REPLACEMENT OF WOUND VACUUM-ASSISTED CLOSURE DEVICE Right 9/23/2024    Procedure: REPLACEMENT, WOUND VAC; white & black sponge;  Surgeon: Moe Liz MD;  Location: [address];  Service: Orthopedics;  Laterality: Right;       Time Tracking:     OT Date of Treatment: 09/24/24  OT Start Time: 1340  OT Stop Time: 1356  OT Total Time (min): 16 min    Billable Minutes:Re-eval 8  Therapeutic Activity 8    9/24/2024

## 2024-09-24 NOTE — PROGRESS NOTES
[facility] - [facility]  Infectious Disease  Progress Note    Patient Name: Cortes Schroeder  MRN: [id number]  Admission Date: 9/6/2024  Length of Stay: 17 days  Attending Physician: Raisa Kearns MD  Primary Care Provider: Andrew Sinclair Jr., MD    Isolation Status: No active isolations  Assessment/Plan:      ID  Surgical site infection  I have reviewed hospital notes from  HM service and other specialty providers. I have also reviewed CBC, CMP/BMP,  cultures and imaging with my interpretation as documented.      64M with poorly controlled DM (A1C 8.5), recent fall resulting in left wrist and right ankle fractures 07/18/24 s/p ex fix Rt ankle and ORIF left wrist 07/19, with subsequent ORIF right ankle on 7/26/24 with Dr. Liz -- now c/b MSSA deep surgical site infection of right ankle involving underlying hardware with associated MSSA bacteremia.        Blood cultures 9/6 and 9/7 + for MSSA.  Repeat blood cultures 9/8, 9/11 NGTD.  TTE negative.      S/p I&D on 09/06 and 9/9.  Surgical cxs +GBS, MSSA.    On 09/17 underwent angio and wound vac exchange. S/p I&D with wound vac exchange 09/20 and 09/23. During washout 09/23, ortho removed all hardware, and noted evidence of poor wound healing  with extensive wound breakdown 2/2 PVD still despite having balloon angio 09/17. There is possibility pt may require BKA if continues to have poor healing.     Pt maintained on oxacillin. Remains AF, VSS, wbc normalized, LFTs nml, Cr 1.2 (returned to baseline). Plastics plans for closure 09/25.    Recommendations / Plan:  Continue IV oxacillin 12 g q 24 hours continuous infusion.     To complete abx duration of 6wks s/p last I&D 09/23, DON 11/04.  Will continue to follow along for ongoing abx management.    -- Discussed with ID staff and primary team   -- ID will continue to follow w/ further recs.            Thank you for your consult. I will follow-up with patient. Please contact us if you have any additional  questions.    Andrew Kearns PA-C  Infectious Disease  [facility] - [facility]    Subjective:     Principal Problem:Surgical wound breakdown    HPI: Cortes Schroeder is a 64 year old with HTN, poorly controlled DM (A1C 8.5), recent history of syncopal episodes, and  right ankle fracture on 7/18/24 s/p ex fix with subsequent ORIF on 7/26/24 with Dr. Liz.  At outpatient Orthopedic follow up surgical wound was healing and sutures removed.  Since then patient  reportedly doing well until about 1 week ago when he noticed some drainage from his surgical incisions.  Over the past week he developed fevers, chills, and night sweats.  Yesterday he became confused/altered and was brought to ED.  In ED was hypotensive, tachycardic, WBC 18, .  Sepsis protocol initiated and started on clindamycin has been doing well postoperatively until around 1 week ago when he started noticing some drainage from his surgical incisions. He started to develop fevers, chills and night sweats over the past week. Yesterday afternoon, patient became altered and was brought to the ED by his roommate. Patient currently lives in [place]. Upon presentation to the ED, patient was tachycardic, hypotensive and afebrile. WBC of 18.68, .3. Sepsis protocol initiated. Patient was started on clindamycin and vancomycin.  Now on vancomycin and cefepime.       Orthopedics consulted.  Noted draining wound over the medial side of the right ankle as well as eschar formation over the lateral side of the ankle.  Right ankle and foot noted to be erythematous and edematous.  Pending surgical I&D today.           No new subjective & objective note has been filed under this hospital service since the last note was generated.

## 2024-09-25 ENCOUNTER — ANESTHESIA EVENT (OUTPATIENT)
Dept: SURGERY | Facility: HOSPITAL | Age: 64
DRG: 856 | End: 2024-09-25
Payer: COMMERCIAL

## 2024-09-25 LAB
ALBUMIN SERPL BCP-MCNC: 1.6 G/DL (ref 3.5–5.2)
ALP SERPL-CCNC: 174 U/L (ref 55–135)
ALT SERPL W/O P-5'-P-CCNC: 5 U/L (ref 10–44)
ANION GAP SERPL CALC-SCNC: 7 MMOL/L (ref 8–16)
AST SERPL-CCNC: 17 U/L (ref 10–40)
BILIRUB SERPL-MCNC: 0.2 MG/DL (ref 0.1–1)
BUN SERPL-MCNC: 19 MG/DL (ref 8–23)
CALCIUM SERPL-MCNC: 7.8 MG/DL (ref 8.7–10.5)
CHLORIDE SERPL-SCNC: 108 MMOL/L (ref 95–110)
CO2 SERPL-SCNC: 21 MMOL/L (ref 23–29)
CREAT SERPL-MCNC: 1.1 MG/DL (ref 0.5–1.4)
ERYTHROCYTE [DISTWIDTH] IN BLOOD BY AUTOMATED COUNT: 17.1 % (ref 11.5–14.5)
EST. GFR  (NO RACE VARIABLE): >60 ML/MIN/1.73 M^2
GLUCOSE SERPL-MCNC: 217 MG/DL (ref 70–110)
HCT VFR BLD AUTO: 25 % (ref 40–54)
HGB BLD-MCNC: 7.6 G/DL (ref 14–18)
MCH RBC QN AUTO: 27.2 PG (ref 27–31)
MCHC RBC AUTO-ENTMCNC: 30.4 G/DL (ref 32–36)
MCV RBC AUTO: 90 FL (ref 82–98)
PLATELET # BLD AUTO: 435 K/UL (ref 150–450)
PMV BLD AUTO: 9.7 FL (ref 9.2–12.9)
POCT GLUCOSE: 107 MG/DL (ref 70–110)
POCT GLUCOSE: 221 MG/DL (ref 70–110)
POCT GLUCOSE: 260 MG/DL (ref 70–110)
POCT GLUCOSE: 85 MG/DL (ref 70–110)
POTASSIUM SERPL-SCNC: 4.2 MMOL/L (ref 3.5–5.1)
PROT SERPL-MCNC: 5.9 G/DL (ref 6–8.4)
RBC # BLD AUTO: 2.79 M/UL (ref 4.6–6.2)
SODIUM SERPL-SCNC: 136 MMOL/L (ref 136–145)
WBC # BLD AUTO: 9.44 K/UL (ref 3.9–12.7)

## 2024-09-25 PROCEDURE — 85027 COMPLETE CBC AUTOMATED: CPT | Performed by: STUDENT IN AN ORGANIZED HEALTH CARE EDUCATION/TRAINING PROGRAM

## 2024-09-25 PROCEDURE — 25000003 PHARM REV CODE 250: Performed by: STUDENT IN AN ORGANIZED HEALTH CARE EDUCATION/TRAINING PROGRAM

## 2024-09-25 PROCEDURE — 36415 COLL VENOUS BLD VENIPUNCTURE: CPT | Performed by: STUDENT IN AN ORGANIZED HEALTH CARE EDUCATION/TRAINING PROGRAM

## 2024-09-25 PROCEDURE — 21400001 HC TELEMETRY ROOM

## 2024-09-25 PROCEDURE — 80053 COMPREHEN METABOLIC PANEL: CPT | Performed by: STUDENT IN AN ORGANIZED HEALTH CARE EDUCATION/TRAINING PROGRAM

## 2024-09-25 PROCEDURE — 99232 SBSQ HOSP IP/OBS MODERATE 35: CPT | Mod: ,,,

## 2024-09-25 PROCEDURE — 63600175 PHARM REV CODE 636 W HCPCS: Performed by: STUDENT IN AN ORGANIZED HEALTH CARE EDUCATION/TRAINING PROGRAM

## 2024-09-25 RX ORDER — INSULIN GLARGINE 100 [IU]/ML
20 INJECTION, SOLUTION SUBCUTANEOUS DAILY
Status: DISCONTINUED | OUTPATIENT
Start: 2024-09-25 | End: 2024-09-26

## 2024-09-25 RX ORDER — INSULIN ASPART 100 [IU]/ML
0-10 INJECTION, SOLUTION INTRAVENOUS; SUBCUTANEOUS EVERY 4 HOURS PRN
Status: DISCONTINUED | OUTPATIENT
Start: 2024-09-25 | End: 2024-09-25

## 2024-09-25 RX ORDER — INSULIN ASPART 100 [IU]/ML
0-10 INJECTION, SOLUTION INTRAVENOUS; SUBCUTANEOUS
Status: DISCONTINUED | OUTPATIENT
Start: 2024-09-25 | End: 2024-09-26

## 2024-09-25 RX ORDER — SODIUM CHLORIDE, SODIUM LACTATE, POTASSIUM CHLORIDE, CALCIUM CHLORIDE 600; 310; 30; 20 MG/100ML; MG/100ML; MG/100ML; MG/100ML
INJECTION, SOLUTION INTRAVENOUS CONTINUOUS
Status: DISCONTINUED | OUTPATIENT
Start: 2024-09-26 | End: 2024-09-30

## 2024-09-25 RX ORDER — HYDROCODONE BITARTRATE AND ACETAMINOPHEN 500; 5 MG/1; MG/1
TABLET ORAL
Status: DISCONTINUED | OUTPATIENT
Start: 2024-09-25 | End: 2024-10-04 | Stop reason: HOSPADM

## 2024-09-25 RX ADMIN — CHOLECALCIFEROL TAB 25 MCG (1000 UNIT) 1000 UNITS: 25 TAB at 08:09

## 2024-09-25 RX ADMIN — ACETAMINOPHEN 1000 MG: 325 TABLET ORAL at 10:09

## 2024-09-25 RX ADMIN — INSULIN GLARGINE 20 UNITS: 100 INJECTION, SOLUTION SUBCUTANEOUS at 10:09

## 2024-09-25 RX ADMIN — PANTOPRAZOLE SODIUM 40 MG: 40 TABLET, DELAYED RELEASE ORAL at 08:09

## 2024-09-25 RX ADMIN — ACETAMINOPHEN 1000 MG: 325 TABLET ORAL at 02:09

## 2024-09-25 RX ADMIN — INSULIN ASPART 8 UNITS: 100 INJECTION, SOLUTION INTRAVENOUS; SUBCUTANEOUS at 11:09

## 2024-09-25 RX ADMIN — AMLODIPINE BESYLATE 10 MG: 10 TABLET ORAL at 08:09

## 2024-09-25 RX ADMIN — INSULIN ASPART 2 UNITS: 100 INJECTION, SOLUTION INTRAVENOUS; SUBCUTANEOUS at 04:09

## 2024-09-25 RX ADMIN — NORTRIPTYLINE HYDROCHLORIDE 50 MG: 25 CAPSULE ORAL at 09:09

## 2024-09-25 RX ADMIN — LOSARTAN POTASSIUM 50 MG: 25 TABLET, FILM COATED ORAL at 08:09

## 2024-09-25 RX ADMIN — OXACILLIN 12 G: 2 INJECTION, POWDER, FOR SOLUTION INTRAMUSCULAR; INTRAVENOUS at 05:09

## 2024-09-25 RX ADMIN — INSULIN ASPART 6 UNITS: 100 INJECTION, SOLUTION INTRAVENOUS; SUBCUTANEOUS at 08:09

## 2024-09-25 RX ADMIN — ENOXAPARIN SODIUM 40 MG: 40 INJECTION SUBCUTANEOUS at 04:09

## 2024-09-25 RX ADMIN — ACETAMINOPHEN 1000 MG: 325 TABLET ORAL at 05:09

## 2024-09-25 NOTE — ASSESSMENT & PLAN NOTE
Cortes Schroeder is a 64 y.o. male with PMH of DM, HTN  and right trimalleolar ankle fracture status post ex fix on 07/18 with subsequent definitive fixation on 07/26 admitted with right ankle wound dehiscence in the setting of uncontrolled diabetes.      He is s/p I&D of R ankle 09/06. Repeat I&D R medial ankle 09/09. 9/17 angiography and medial ankle wound vac exchange.   To OR 9/20 and 9/23 for repeat I&D and vac exchange. Hardware removed on 9/23.   Propeller flap with plastics 9/26.     Diet: NPO at midnight tonight   Pain control: multimodal regimen  PT/OT:  NWB RLE   DVT PPx: Lvx   Abx:  oxacillin continuous; ID following   Cultures:  ankle cx staph +    Dispo: OR with plastics Wednesday 9/26

## 2024-09-25 NOTE — NURSING
Nurses Note -- 4 Eyes      9/25/2024   7:53 AM      Skin assessed during: Daily Assessment      [x] No Altered Skin Integrity Present    []Prevention Measures Documented      [] Yes- Altered Skin Integrity Present or Discovered   [] LDA Added if Not in Epic (Describe Wound)   [] New Altered Skin Integrity was Present on Admit and Documented in LDA   [] Wound Image Taken    Wound Care Consulted? No    Attending Nurse:  FRANCHESKA Olvera    Second RN/Staff Member:  NIEVES Hsu

## 2024-09-25 NOTE — SUBJECTIVE & OBJECTIVE
Interval History: Patient re-evaluated by Plastics this am and stated due to presence of lateral wound in addition to anterior wound no longer a candidate for propellar flap and Plastics states patient will need to do free flap to cover both wounds. Plastics concerned if no adequate vascular targets then will be unable to cover and BKA is only option. Patient not very happy about the news nor happy that his surgery not today. He states he has been here at [facility] too long and tired of being in hospital. I explained to him that we are trying to save his foot and leg and doing everything we can to make that happen and needs to be in hospital to get the proper care that he needs. Patient still not happy even after my explanation. He stated Sukhdeep did come last night and bring him wings that he liked and ate. Patient drinking [employer] iced coffee this am. [facility] tech did take him down to [facility] today to get some fresh air. Patient to go to OR tomorrow with Plastics for free flap. Patient remains on IV Oxacillin for right ankle infection. Wound vacs in place to right ankle incisions. Patient reports pain in right ankle is controlled and 2/10. Labs reviewed. Hgb decreased down to 7.6 today from 8.3 yesterday. No clinical signs of bleeding but will need to monitor. Potassium improved to 4.2 today from 3.3 yesterday after oral replacement.     Review of Systems   Constitutional:  Negative for fever.   Respiratory:  Negative for shortness of breath.    Cardiovascular:  Negative for chest pain.   Gastrointestinal:  Negative for nausea and vomiting.   Musculoskeletal:  Positive for arthralgias (Right ankle).   Psychiatric/Behavioral:  Negative for confusion.      Objective:     Vital Signs (Most Recent):  Temp: 97.5 °F (36.4 °C) (09/25/24 1540)  Pulse: 79 (09/25/24 1540)  Resp: 19 (09/25/24 1540)  BP: 158/76 (09/25/24 1540)  SpO2: 96 % (09/25/24 1540) on room air Vital Signs (24h Range):  Temp:  [97.5 °F (36.4 °C)-98.4 °F (36.9  °C)] 97.5 °F (36.4 °C)  Pulse:  [79-95] 79  Resp:  [16-19] 19  SpO2:  [95 %-97 %] 96 %  BP: (150-165)/(67-86) 158/76     Weight: 93 kg (205 lb 0.4 oz)  Body mass index is 34.12 kg/m².    Intake/Output Summary (Last 24 hours) at 9/25/2024 1600  Last data filed at 9/25/2024 1454  Gross per 24 hour   Intake 237 ml   Output 1460 ml   Net -1223 ml         Physical Exam  Vitals and nursing note reviewed.   Constitutional:       General: He is awake. He is not in acute distress.     Appearance: Normal appearance. He is well-developed. He is obese.      Comments: Patient sitting up in bed in no distress and in no apparent pain. Patient in good spirits.    Eyes:      Conjunctiva/sclera: Conjunctivae normal.   Cardiovascular:      Rate and Rhythm: Normal rate and regular rhythm.      Heart sounds: Normal heart sounds. No murmur heard.  Pulmonary:      Effort: Pulmonary effort is normal. No respiratory distress.      Breath sounds: Normal breath sounds. No wheezing.   Abdominal:      General: Abdomen is flat. Bowel sounds are normal. There is no distension.      Palpations: Abdomen is soft.      Tenderness: There is no abdominal tenderness.   Musculoskeletal:      Left lower leg: No edema.      Comments: Wound vac in place to right ankle and ACE bandage overlying wound vac.    Skin:     General: Skin is warm.      Findings: No erythema.   Neurological:      Mental Status: He is alert and oriented to person, place, and time.   Psychiatric:         Mood and Affect: Mood normal.         Behavior: Behavior normal. Behavior is cooperative.         Thought Content: Thought content normal.         Judgment: Judgment normal.             Significant Labs: CBC:   Recent Labs   Lab 09/24/24 0257 09/25/24  0233   WBC 9.96 9.44   HGB 8.3* 7.6*   HCT 25.5* 25.0*   * 435     CMP:   Recent Labs   Lab 09/24/24 0257 09/25/24  0233    136   K 3.3* 4.2    108   CO2 20* 21*   GLU 70 217*   BUN 20 19   CREATININE 1.0 1.1    CALCIUM 7.9* 7.8*   PROT 6.2 5.9*   ALBUMIN 1.7* 1.6*   BILITOT 0.3 0.2   ALKPHOS 156* 174*   AST 18 17   ALT 5* 5*   ANIONGAP 11 7*     Blood Sugars (AccuCheck):    Recent Labs     09/24/24  1141 09/24/24  1554 09/24/24  1930 09/25/24  0733 09/25/24  1123 09/25/24  1542   POCTGLUCOSE 88 130* 299* 260* 221* 107       Significant Imaging: I have reviewed all pertinent imaging results/findings within the past 24 hours.

## 2024-09-25 NOTE — ASSESSMENT & PLAN NOTE
Noted on CT scan of chest. Patient with no acute issues and incidentally noted on CT scan of chest. Patient asymptomatic.

## 2024-09-25 NOTE — PROGRESS NOTES
.Plastic and Reconstructive Surgery   Progress Note    Subjective:    Preop for OR tomorrow. New findings of lateral wound discussed with ortho team. Will attempt free flap coverage. If unable to find adequate vascular targets, high risk for BKA    Objective:  Vital signs in last 24 hours:  Temp:  [97.8 °F (36.6 °C)-99.3 °F (37.4 °C)] 98.3 °F (36.8 °C)  Pulse:  [] 89  Resp:  [16-20] 16  SpO2:  [94 %-95 %] 95 %  BP: (150-183)/(67-86) 164/86    Intake/Output last 3 shifts:  I/O last 3 completed shifts:  In: 970 [P.O.:970]  Out: 2360 [Urine:2300; Other:60]    Intake/Output this shift:  I/O this shift:  In: -   Out: 150 [Other:150]        Physical Exam:  VITAL SIGNS:   Vitals:    24 0305 24 0426 24 0753 24 0846   BP:  (!) 154/70 (!) 164/86    BP Location:   Right arm    Patient Position:   Lying    Pulse: 88 95 89    Resp:  17 16    Temp:  98.2 °F (36.8 °C) 98.3 °F (36.8 °C)    TempSrc:   Oral    SpO2:  95% 95% 95%   Weight:       Height:         TMAX: Temp (24hrs), Av.4 °F (36.9 °C), Min:97.8 °F (36.6 °C), Max:99.3 °F (37.4 °C)      General: Alert; No acute distress  Cardiovascular: Regular rate   Respiratory: Normal respiratory effort. Chest rise symmetric.   Abdomen: Soft, nontender, nondistended  Extremity:  RLE vac in place with likely some revasc erythema, unable to appreciate palpable PT/DP, +++doppler PT Signal, + DP signal. +Neurologic: No focal deficit. Speech normal      Scheduled Medications acetaminophen, 1,000 mg, Q8H  amLODIPine, 10 mg, Daily  enoxaparin, 40 mg, Daily  insulin aspart U-100, 2-8 Units, TIDWM  insulin glargine U-100, 20 Units, Daily  losartan, 50 mg, Daily  nortriptyline, 50 mg, Nightly  oxacillin 12 g in  mL CONTINUOUS INFUSION, 12 g, Q24H  pantoprazole, 40 mg, Daily  polyethylene glycol, 17 g, BID  senna, 8.6 mg, BID  sucralfate, 1 g, Q6H  vitamin D, 1,000 Units, Daily        PRN Medications     Current Facility-Administered Medications:      albuterol-ipratropium, 3 mL, Nebulization, Q4H PRN    calcium carbonate, 1,000 mg, Oral, TID PRN    dextrose 10%, 12.5 g, Intravenous, PRN    dextrose 10%, 12.5 g, Intravenous, PRN    dextrose 10%, 12.5 g, Intravenous, PRN    dextrose 10%, 25 g, Intravenous, PRN    dextrose 10%, 25 g, Intravenous, PRN    dextrose 10%, 25 g, Intravenous, PRN    fentaNYL, 25 mcg, Intravenous, Q5 Min PRN    glucagon (human recombinant), 1 mg, Intramuscular, PRN    glucagon (human recombinant), 1 mg, Intramuscular, PRN    glucose, 16 g, Oral, PRN    glucose, 24 g, Oral, PRN    hydrALAZINE, 25 mg, Oral, Q8H PRN    insulin aspart U-100, 0-10 Units, Subcutaneous, QID (AC + HS) PRN    methocarbamoL, 500 mg, Oral, TID PRN    morphine, 2 mg, Intravenous, Q6H PRN    morphine, 4 mg, Intravenous, Q6H PRN    ondansetron, 8 mg, Oral, Q8H PRN    prochlorperazine, 5 mg, Intravenous, Q30 Min PRN    Recent Labs:   Lab Results   Component Value Date    WBC 9.44 09/25/2024    HGB 7.6 (L) 09/25/2024    HCT 25.0 (L) 09/25/2024    MCV 90 09/25/2024     09/25/2024     Lab Results   Component Value Date     (H) 09/25/2024     09/25/2024    K 4.2 09/25/2024     09/25/2024    BUN 19 09/25/2024         Assessment:   64 y.o. y/o male s/p Procedure(s):  REPLACEMENT, WOUND VAC; white & black sponge  REMOVAL, HARDWARE, ANKLE      2 Days Post-Op     64 y.o.male W/ right ankle post-surgical medial wound dehiscence and exposed hardware.      Now s/p PTA RLE PT/AT, completion angiogram showing 3 vessel run off, also washout and vac exchange     Plan  Plan for OR for flap coverage next Wednesday 9/25  - Ortho/Vasc/Endocrine/ID recs appreciated  - Continue to optimize nutrition   - Continue wound vac per Ortho and Abx per primary for now  - continue preoptimization     Kristian Silva MD  Plastic Surgery Fellow  09/20/2024    Plan  - revascularization performed last week. Strong PT however arch is not well revascularized on doppler signals  -  given lateral wound breakdown, no longer a candidate for propellar flap. Will need to perform free flap to cover both wounds. If no adequate vascular targets, will be unable to cover and BKA is only option.  - Drains: vac in place  - Diet: NPOpm, IVF after midngiht  - Abx: per ID  - DVT ppx, IS, OOB, ambulate, PT/OT      Patient was discussed with Attending Plastic Surgeon, Dr. Rom Silva MD- Fellow  Department of Plastic and Reconstructive Surgery

## 2024-09-25 NOTE — PLAN OF CARE
Problem: Adult Inpatient Plan of Care  Goal: Absence of Hospital-Acquired Illness or Injury  Outcome: Progressing  Goal: Optimal Comfort and Wellbeing  Outcome: Progressing  Goal: Readiness for Transition of Care  Outcome: Progressing     Problem: Fall Injury Risk  Goal: Absence of Fall and Fall-Related Injury  Outcome: Progressing     Problem: Sepsis/Septic Shock  Goal: Optimal Coping  Outcome: Progressing  Goal: Absence of Bleeding  Outcome: Progressing  Goal: Blood Glucose Level Within Targeted Range  Outcome: Progressing  Goal: Absence of Infection Signs and Symptoms  Outcome: Progressing  Goal: Optimal Nutrition Intake  Outcome: Progressing     Problem: Acute Kidney Injury/Impairment  Goal: Fluid and Electrolyte Balance  Outcome: Progressing  Goal: Improved Oral Intake  Outcome: Progressing  Goal: Effective Renal Function  Outcome: Progressing     Problem: Wound  Goal: Optimal Coping  Outcome: Progressing  Goal: Optimal Functional Ability  Outcome: Progressing  Goal: Absence of Infection Signs and Symptoms  Outcome: Progressing  Goal: Improved Oral Intake  Outcome: Progressing  Goal: Optimal Pain Control and Function  Outcome: Progressing  Goal: Skin Health and Integrity  Outcome: Progressing  Goal: Optimal Wound Healing  Outcome: Progressing     Problem: Diabetes Comorbidity  Goal: Blood Glucose Level Within Targeted Range  Outcome: Progressing     Problem: Skin Injury Risk Increased  Goal: Skin Health and Integrity  Outcome: Progressing     Problem: Infection  Goal: Absence of Infection Signs and Symptoms  Outcome: Progressing     Pt alert/calm/comfortable. No c/o of pain. Wound vac in place, minimal sanguinous drainage. Dressing c/d/I. BG monitored/controlled.

## 2024-09-25 NOTE — PT/OT/SLP PROGRESS
Physical Therapy      Patient Name:  Cortes Schroeder   MRN:  [id number]    Patient not seen today secondary to fatigue pt reported he is scheduled for surgery tomorrow and just wants to rest when attempted in the PM. Will follow-up per POC.    9/25/2024

## 2024-09-25 NOTE — ASSESSMENT & PLAN NOTE
Patient's FSGs controlled. Endocrine consulted and managing patient's diabetes in hospital and appreciate assistance. Patient on insulin pump at [facility] but not in hospital and on basal/prandial insulin in hospital as per Endocrine. Appreciate Endocrine assistance on this case.   Last A1c reviewed-   Lab Results   Component Value Date    LABA1C 10.8 (H) 08/15/2016    HGBA1C 8.5 (H) 09/06/2024     Most recent fingerstick glucose reviewed-   Recent Labs   Lab 09/24/24  1930 09/25/24  0733 09/25/24  1123 09/25/24  1542   POCTGLUCOSE 299* 260* 221* 107       Current correctional scale  Low  Maintain anti-hyperglycemic dose as follows-   Antihyperglycemics (From admission, onward)    Start     Stop Route Frequency Ordered    09/25/24 1000  insulin aspart U-100 pen 0-10 Units         -- SubQ Before meals & nightly PRN 09/25/24 0901    09/25/24 0900  insulin glargine U-100 (Lantus) pen 20 Units         -- SubQ Daily 09/25/24 0844    09/20/24 1130  insulin aspart U-100 pen 2-8 Units         -- SubQ 3 times daily with meals 09/20/24 0844         - Endocrine consulted:  - At this time, recommend that the patient remain off of his pump since he cannot be controlled in the Auto mode. Patient does not interact with pump frequently and relies on the auto mode algorithm.   - Insulin dosing as per Endocrine recommendations.   - Hold oral antihyperglycemics during hospitalization   - Monitor blood sugars with meals and at bedtime in hospital.  - Target blood sugars 140-180 in hospital.  - Diabetic diet.

## 2024-09-25 NOTE — ANESTHESIA PREPROCEDURE EVALUATION
[facility]  Anesthesia Pre-Operative Evaluation    Patient Name: Cortes Schroeder  YOB: 1960  MRN: [id number]    SUBJECTIVE:   09/25/2024    Pre-operative evaluation for Procedure(s) (LRB):  CREATION, FREE FLAP (Right)    Cortes Schroeder is a 64 y.o. male with a PMHx significant for HTN, T2DM, PAD, GERD, and admission for R ankle wound dehiscence now s/p I&Ds and wound vac changes. Patient now presents for the above procedure(s).      PAPER CONSENT    Previous Airway:     Intubated:  Postinduction    Mask Ventilation:  Easy with oral airway    Method of Intubation:  Video laryngoscopy    Blade:  Bravo 3    Laryngeal View Grade: Grade I - full view of cords      Difficult Airway Encountered?: No      Airway Device Size:  7.5    Style/Cuff Inflation:  Cuffed (inflated to minimal occlusive pressure)    Tube secured:  24    Secured at:  The lips    LDA:        Peripheral IV - Single Lumen 09/23/24 0820 18 G 1 3/4 in Right Forearm (Active)   Site Assessment Clean;Dry;Intact;No redness;No swelling 09/25/24 0800   Extremity Assessment Distal to IV No warmth;No swelling;No redness;No abnormal discoloration 09/24/24 2000   Line Status Saline locked 09/25/24 0800   Dressing Status Clean;Dry;Intact 09/25/24 0800   Dressing Intervention Integrity maintained 09/25/24 0800   Dressing Change Due 09/27/24 09/23/24 0820   Site Change Due 09/27/24 09/23/24 0820   Reason Not Rotated Not due 09/24/24 2000   Number of days: 2            Peripheral IV - Single Lumen 09/23/24 0825 18 G Left Forearm (Active)   Site Assessment Clean;Dry;Intact;No redness;No swelling 09/25/24 0800   Extremity Assessment Distal to IV No warmth;No swelling;No redness;No abnormal discoloration 09/24/24 2000   Line Status Infusing 09/25/24 0800   Dressing Status Clean;Dry;Intact 09/25/24 0800   Dressing Intervention Integrity maintained 09/25/24 0800   Dressing Change Due 09/27/24 09/23/24 0825   Site Change Due 09/27/24  09/23/24 0825   Reason Not Rotated Not due 09/24/24 2000   Number of days: 2       Transthoracic Echo  Results for orders placed during the hospital encounter of 09/06/24    Echo    Interpretation Summary    Left Ventricle: The left ventricle is normal in size. Normal wall thickness. There is normal systolic function with a visually estimated ejection fraction of 65 - 70%. There is normal diastolic function.    Right Ventricle: Normal right ventricular cavity size. Wall thickness is normal. Systolic function is normal.    IVC/SVC: Normal venous pressure at 3 mmHg.    No evidence of intracardiac mass or vegetation.      Patient Active Problem List   Diagnosis    Hyperlipidemia    Anxiety    Chronic bilateral low back pain without sciatica    Neuropathy    Uncontrolled type 2 diabetes mellitus with diabetic polyneuropathy, with long-term current use of insulin    NPDR (nonproliferative diabetic retinopathy)    Insomnia    Gastroesophageal reflux disease without esophagitis    Chronic pain syndrome    Diabetic nephropathy associated with type 2 diabetes mellitus    Right sided weakness    Cervical syndrome    Chronic neck pain    Muscle weakness    Impaired motor control    Decreased range of motion (ROM) of shoulder    Cervical spondylosis without myelopathy    Neuroforaminal stenosis of cervical spine    Right cervical radiculopathy    Cervical radiculopathy    DDD (degenerative disc disease), lumbar    Insulin pump status    Class 1 obesity due to excess calories with serious comorbidity and body mass index (BMI) of 34.0 to 34.9 in adult    High risk homosexual behavior    Lumbar radiculopathy    Lumbar spondylosis    Left lumbar radiculitis    Closed trimalleolar fracture of right ankle    Polyneuropathy due to type 2 diabetes mellitus    Uncontrolled type 2 diabetes mellitus with hyperglycemia    Abnormal thyroid blood test    Surgical wound breakdown    Surgical site infection     MSSA bacteremia    On pre-exposure prophylaxis for HIV    Thrombocytosis    Acute blood loss anemia    PAD (peripheral artery disease)    Airway malacia     Review of patient's allergies indicates:   Allergen Reactions    Invokana [canagliflozin] Anaphylaxis    Percocet [oxycodone-acetaminophen] Nausea Only and Hallucinations    Biaxin [clarithromycin]     Hydrocodone Other (See Comments)     Dizzy/nausea/hallucinations    Sulfa (sulfonamide antibiotics) Nausea Only and Rash       Current Outpatient Medications   Medication Instructions    acetaminophen (TYLENOL) 650 mg, Oral, Every 6 hours    aspirin (ECOTRIN) 81 mg, Oral, 2 times daily, End date sept 20, 2024    atorvastatin (LIPITOR) 40 mg, Oral, Daily    blood-glucose sensor (DEXCOM G6 SENSOR) Omaira Change sensor every 10 days    blood-glucose transmitter (DEXCOM G6 TRANSMITTER) Omaira Change transmitter every 3 months    celecoxib (CELEBREX) 200 mg, Oral, Daily    cyanocobalamin (VITAMIN B-12) 1,000 mcg, Oral, Daily    DULoxetine (CYMBALTA) 60 mg, Oral, Daily    emtricitabine-tenofovir 200-300 mg (TRUVADA) 200-300 mg Tab 1 tablet, Oral, Daily    enoxaparin (LOVENOX) 40 mg, Subcutaneous, Daily    fish oil-omega-3 fatty acids 300-1,000 mg capsule 1 capsule, Oral, 2 times daily    gabapentin (NEURONTIN) 300 MG capsule Take 2 capsules (600 mg total) by mouth once daily AND 4 capsules (1,200 mg total) every evening.    HUMALOG U-100 INSULIN 100 unit/mL injection 1:20 U PRN high blood sugar and snacks. Correction dose- Enter carb coverage intake upon administration<BR>Target number 120 Carbohydrate coverage #1 1:2 Carbohydrate coverage #1 time [id number] Carbohydrate coverage #2 1:3 Carbohydrate coverage #2 time [id number] Carbohydrate coverage #3 Carbohydrate coverage #3 time Carbohydrate coverage #4 Carbohydrate coverage #4 time Sensitivity #1 1:20 Sensitivity #1 time 6727-6968 Sensitivity #2 Sensitivity #2 time Sensitivity #3 Sensitivity #3  time Sensitivity #4 Sensitivity #4 time Sensitivity #5 Sensitivity #5 time Order Questions Question Answer Comment <BR><BR><BR>50 U SQ continuous<BR>Target number 120 Basal Rate #1 2.3 Basal rate #1 time 4795-8836 Basal Rate #2 1.55 Basal rate #2 time 6306-9800 Basal rate #3 Basal rate #3 time Basal rate #4 Basal rate #4 time Basal rate #5 Basal rate #5 time    losartan (COZAAR) 25 mg, Oral, Daily    magnesium oxide (MAG-OX) 200 mg, Oral, Daily    melatonin (MELATIN) 6 mg, Oral, Nightly PRN    methocarbamoL (ROBAXIN) 500 mg, Oral, 4 times daily PRN    morphine (MSIR) 15 mg, Oral, Every 4 hours PRN    MOUNJARO 10 mg, Subcutaneous, Weekly, HOLD until all surgeries completed and out of rehab    nortriptyline (PAMELOR) 50 mg, Oral, Nightly    ondansetron (ZOFRAN-ODT) 8 mg, Oral, Every 6 hours PRN    polyethylene glycol (GLYCOLAX) 17 g, Oral, Daily    rosuvastatin (CRESTOR) 10 mg, Oral, Nightly    senna-docusate 8.6-50 mg (PERICOLACE) 8.6-50 mg per tablet 1 tablet, Oral, 2 times daily    vitamin D (VITAMIN D3) 1,000 Units, Oral, 2 times daily       Past Surgical History:   Procedure Laterality Date    ANGIOGRAPHY OF LOWER EXTREMITY Right 9/17/2024    Procedure: Angiogram Extremity Unilateral;  Surgeon: GINA Morton II, MD;  Location: [facility] OR [address] [place] [address];  Service: Vascular;  Laterality: Right;  Fluro time 19.5 min  mGy 260.49    ANKLE HARDWARE REMOVAL Right 9/23/2024    Procedure: REMOVAL, HARDWARE, ANKLE;  Surgeon: Moe Liz MD;  Location: [facility] OR [address] [place] [address];  Service: Orthopedics;  Laterality: Right;    APPLICATION OF WOUND VACUUM-ASSISTED CLOSURE DEVICE Right 9/6/2024    Procedure: APPLICATION, WOUND VAC;  Surgeon: Moe Liz MD;  Location: [facility] OR [address] [place] [address];  Service: Orthopedics;  Laterality: Right;    APPLICATION, EXTERNAL FIXATION DEVICE, FOR ANKLE FRACTURE Right 7/18/2024    Procedure: APPLICATION, EXTERNAL FIXATION DEVICE, FOR ANKLE FRACTURE;  Surgeon: Moe Liz MD;   [address];  Service: Orthopedics;  Laterality: Right;    ARTHROTOMY OF ANKLE Right 9/9/2024    Procedure: ARTHROTOMY, ANKLE;  Surgeon: Moe Liz MD;  [facility];  Service: Orthopedics;  Laterality: Right;    BACK SURGERY  2003    Lumbar Spine    CATARACT EXTRACTION      CLOSED REDUCTION, FRACTURE, ANKLE, TRIMALLEOLAR Right 7/18/2024    Procedure: CLOSED REDUCTION, FRACTURE, ANKLE, TRIMALLEOLAR;  Surgeon: Moe Liz MD;  [facility];  Service: Orthopedics;  Laterality: Right;    EPIDURAL STEROID INJECTION N/A 1/16/2019    Procedure: Injection, Steroid, Epidural Cervical;  Surgeon: Andrew Sinclair Jr., MD;  Location: [facility] ENDO;  Service: Pain Management;  Laterality: N/A;  Cervical Epidural Steroid Injection C7-T1    34049    Arrive @ 1240    EPIDURAL STEROID INJECTION N/A 2/20/2019    Procedure: Injection, Steroid, Epidural;  Surgeon: Andrew Sinclair Jr., MD;  Location: [facility] ENDO;  Service: Pain Management;  Laterality: N/A;  Lumbar Epidural Steroid Injection L4-5    56218    Arrive @ 1215 (requests latest time)    FIXATION OF SYNDESMOSIS OF ANKLE Right 7/26/2024    Procedure: FIXATION, SYNDESMOSIS, ANKLE;  Surgeon: Moe Liz MD;  Location: [address];  Service: Orthopedics;  Laterality: Right;    INJECTION, SPINE, LUMBOSACRAL, TRANSFORAMINAL APPROACH Bilateral 6/14/2024    Procedure: Bilateral L5 Transforaminal Epidural Steroid Injections;  Surgeon: Andrew Sinclair Jr., MD;  Location: [facility] PAIN MANAGEMENT;  Service: Pain Management;  Laterality: Bilateral;  @1300(given)  ASA last 6/8  Check BG  MD Sign.    IRRIGATION AND DEBRIDEMENT Right 9/6/2024    Procedure: IRRIGATION AND DEBRIDEMENT;  Surgeon: Moe Liz MD;  Location: [address];  Service: Orthopedics;  Laterality: Right;    IRRIGATION AND DEBRIDEMENT Right 9/20/2024    Procedure: IRRIGATION AND DEBRIDEMENT;  Surgeon: Moe Liz MD;   Location: [facility] OR [facility];  Service: Orthopedics;  Laterality: Right;    IRRIGATION AND DEBRIDEMENT OF LOWER EXTREMITY Right 9/9/2024    Procedure: IRRIGATION AND DEBRIDEMENT, LOWER EXTREMITY - WITH WOUND VAC CHANGE, RIGHT ANKLE;  Surgeon: Moe Liz MD;  [facility];  Service: Orthopedics;  Laterality: Right;  WITH WOUND VAC CHANGE, RIGHT ANKLE    OPEN REDUCTION AND INTERNAL FIXATION (ORIF) OF FRACTURE OF DISTAL RADIUS Left 7/19/2024    Procedure: ORIF, FRACTURE, RADIUS, DISTAL - LEFT;  Surgeon: Moe Liz MD;  Location: [facility] OR [address] [place] [address];  Service: Orthopedics;  Laterality: Left;  LEFT, SYNTHES, C ARM    OPEN REDUCTION AND INTERNAL FIXATION (ORIF) OF INJURY OF ANKLE Right 7/26/2024    Procedure: ORIF, ANKLE;  Surgeon: Moe Liz MD;  [facility];  Service: Orthopedics;  Laterality: Right;    REMOVAL OF EXTERNAL FIXATION DEVICE Right 7/26/2024    Procedure: REMOVAL, EXTERNAL FIXATION DEVICE;  Surgeon: Moe Liz MD;  Location: [facility] OR [address] [place] [address];  Service: Orthopedics;  Laterality: Right;    REPLACEMENT OF WOUND VACUUM-ASSISTED CLOSURE DEVICE Right 9/17/2024    Procedure: REPLACEMENT, WOUND VAC;  Surgeon: Moe Liz MD;  Location: [facility] OR [address] [place] [address];  Service: Orthopedics;  Laterality: Right;  wound vac dressing exchange    REPLACEMENT OF WOUND VACUUM-ASSISTED CLOSURE DEVICE Right 9/20/2024    Procedure: REPLACEMENT, WOUND VAC;  Surgeon: Moe Liz MD;  Location: [address];  Service: Orthopedics;  Laterality: Right;    REPLACEMENT OF WOUND VACUUM-ASSISTED CLOSURE DEVICE Right 9/23/2024    Procedure: REPLACEMENT, WOUND VAC; white & black sponge;  Surgeon: Moe Liz MD;  Location: [facility] OR [address] [place] [address];  Service: Orthopedics;  Laterality: Right;       Social History     Substance and Sexual Activity   Drug Use No     Alcohol Use: Not At Risk (9/8/2024)    AUDIT-C     Frequency of Alcohol Consumption: Never     Average Number of  "Drinks: Patient does not drink     Frequency of Binge Drinking: Never     Tobacco Use: Low Risk  (9/9/2024)    Patient History     Smoking Tobacco Use: Never     Smokeless Tobacco Use: Never     Passive Exposure: Not on file       OBJECTIVE:     Vital Signs Range:      8/16/2024    10:17 AM 9/6/2024     1:54 AM 9/6/2024     2:08 AM   Vitals - 1 value per visit   SYSTOLIC  104    DIASTOLIC  50    Pulse  110    Temp  37.1 °C (98.7 °F)    Resp  18    SPO2  97 %    Weight (lb)   205   Weight (kg)   92.987   Height   5' 5" (1.651 m)   BMI (Calculated)   34.1   Pain Score Four           CBC:   Lab Results   Component Value Date    WBC 9.44 09/25/2024    HGB 7.6 (L) 09/25/2024    HCT 25.0 (L) 09/25/2024    MCV 90 09/25/2024     09/25/2024         CMP:   Sodium   Date Value Ref Range Status   09/25/2024 136 136 - 145 mmol/L Final     Potassium   Date Value Ref Range Status   09/25/2024 4.2 3.5 - 5.1 mmol/L Final     Chloride   Date Value Ref Range Status   09/25/2024 108 95 - 110 mmol/L Final     CO2   Date Value Ref Range Status   09/25/2024 21 (L) 23 - 29 mmol/L Final     Glucose   Date Value Ref Range Status   09/25/2024 217 (H) 70 - 110 mg/dL Final     BUN   Date Value Ref Range Status   09/25/2024 19 8 - 23 mg/dL Final     Creatinine   Date Value Ref Range Status   09/25/2024 1.1 0.5 - 1.4 mg/dL Final     Calcium   Date Value Ref Range Status   09/25/2024 7.8 (L) 8.7 - 10.5 mg/dL Final     Total Protein   Date Value Ref Range Status   09/25/2024 5.9 (L) 6.0 - 8.4 g/dL Final     Albumin   Date Value Ref Range Status   09/25/2024 1.6 (L) 3.5 - 5.2 g/dL Final     Total Bilirubin   Date Value Ref Range Status   09/25/2024 0.2 0.1 - 1.0 mg/dL Final     Comment:     For infants and newborns, interpretation of results should be based  on gestational age, weight and in agreement with clinical  observations.    Premature Infant recommended reference ranges:  Up to 24 hours.............<8.0 mg/dL  Up to 48 " hours............<12.0 mg/dL  3-5 days..................<15.0 mg/dL  6-29 days.................<15.0 mg/dL       Alkaline Phosphatase   Date Value Ref Range Status   09/25/2024 174 (H) 55 - 135 U/L Final     AST   Date Value Ref Range Status   09/25/2024 17 10 - 40 U/L Final     ALT   Date Value Ref Range Status   09/25/2024 5 (L) 10 - 44 U/L Final     Anion Gap   Date Value Ref Range Status   09/25/2024 7 (L) 8 - 16 mmol/L Final     eGFR if    Date Value Ref Range Status   01/11/2021 58 (A) >60 mL/min/1.73 m^2 Final     eGFR if non    Date Value Ref Range Status   01/28/2022 68 >59 mL/min/1.73 Final       INR:  Lab Results   Component Value Date    INR 1.0 09/06/2024    INR 1.0 07/18/2024       Cardiac Studies    EKG:   Results for orders placed or performed during the hospital encounter of 09/06/24   EKG 12-lead    Collection Time: 09/08/24  9:39 AM   Result Value Ref Range    QRS Duration 72 ms    [facility] QTC Calculation 399 ms    Narrative    Test Reason : R00.0,    Vent. Rate : 114 BPM     Atrial Rate : 114 BPM     P-R Int : 208 ms          QRS Dur : 072 ms      QT Int : 290 ms       P-R-T Axes : 040 -11 044 degrees     QTc Int : 399 ms    Sinus tachycardia  Inferior infarct ,age undetermined  Abnormal ECG  When compared with ECG of 06-SEP-2024 02:27,  Criteria for Inferior infarct is now Present  Confirmed by Jolie Weiss MD ([id number]) on 9/8/2024 12:06:07 PM    Referred By: [email or web address]   SELF           Confirmed By:Jolie Weiss MD       Transthoracic Echo:  Results for orders placed during the hospital encounter of 09/06/24    Echo    Interpretation Summary    Left Ventricle: The left ventricle is normal in size. Normal wall thickness. There is normal systolic function with a visually estimated ejection fraction of 65 - 70%. There is normal diastolic function.    Right Ventricle: Normal right ventricular cavity size. Wall thickness is normal. Systolic function is normal.    IVC/SVC:  Normal venous pressure at 3 mmHg.    No evidence of intracardiac mass or vegetation.    Cardiac Catheterization:  Results for orders placed during the hospital encounter of 09/06/24    Cardiac catheterization    Narrative  Procedure performed in the Invasive Lab  - See Procedure Log link below for nursing documentation  - See OpNote on Surgeries Tab for physician findings  - See Imaging Tab for radiologist dictation      ASSESSMENT/PLAN:                                                                                                                09/25/2024  Cortes Schroeder is a 64 y.o., male.      Pre-op Assessment    I have reviewed the Patient Summary Reports.     I have reviewed the Nursing Notes. I have reviewed the NPO Status.   I have reviewed the Medications.     Review of Systems  Anesthesia Hx:  No problems with previous Anesthesia                Social:  Non-Smoker       Hematology/Oncology:  Hematology Normal   Oncology Normal                                   EENT/Dental:  EENT/Dental Normal  Airway malacia noted on chest CT.           Cardiovascular:     Hypertension                                        Pulmonary:  Pulmonary Normal                       Renal/:  Renal/ Normal                 Hepatic/GI:     GERD             Musculoskeletal:  Musculoskeletal Normal    Cervical spondylosis             Neurological:    Neuromuscular Disease,                                   Endocrine:  Diabetes, using insulin           Dermatological:  Skin Normal    Psych:  Psychiatric Normal                  Physical Exam  General: Well nourished, Cooperative and Alert    Airway:  Mallampati: III   Mouth Opening: Normal  TM Distance: Normal  Tongue: Normal  Neck ROM: Normal ROM    Dental:  Intact    Chest/Lungs:  Clear to auscultation, Normal Respiratory Rate    Heart:  Rate: Normal  Rhythm: Regular Rhythm  Sounds: Normal      Anesthesia Plan  Type of Anesthesia, risks & benefits discussed:    Anesthesia Type:  Gen ETT  Intra-op Monitoring Plan: Standard ASA Monitors  Post Op Pain Control Plan: multimodal analgesia  Induction:  IV  Airway Plan: Video and Direct  Informed Consent: Informed consent signed with the Patient and all parties understand the risks and agree with anesthesia plan.  All questions answered.   ASA Score: 3  Day of Surgery Review of History & Physical: H&P Update referred to the surgeon/provider.    Ready For Surgery From Anesthesia Perspective.     .

## 2024-09-25 NOTE — SUBJECTIVE & OBJECTIVE
Principal Problem:Surgical wound breakdown    Principal Orthopedic Problem: s/p I&D and wound vac placement on 09/06, 09/20    Interval History: No issues overnight. POD2 from repeat I&D with hardware removal. Patient's pain remains well controlled. Tentative plan for plastics flap tomorrow. Enjoyed his wings yesterday. NPO at midnight.       Review of patient's allergies indicates:   Allergen Reactions    Invokana [canagliflozin] Anaphylaxis    Percocet [oxycodone-acetaminophen] Nausea Only and Hallucinations    Biaxin [clarithromycin]     Hydrocodone Other (See Comments)     Dizzy/nausea/hallucinations    Sulfa (sulfonamide antibiotics) Nausea Only and Rash       Current Facility-Administered Medications   Medication    acetaminophen tablet 1,000 mg    albuterol-ipratropium 2.5 mg-0.5 mg/3 mL nebulizer solution 3 mL    amLODIPine tablet 10 mg    calcium carbonate 200 mg calcium (500 mg) chewable tablet 1,000 mg    dextrose 10% bolus 125 mL 125 mL    dextrose 10% bolus 125 mL 125 mL    dextrose 10% bolus 125 mL 125 mL    dextrose 10% bolus 250 mL 250 mL    dextrose 10% bolus 250 mL 250 mL    dextrose 10% bolus 250 mL 250 mL    enoxaparin injection 40 mg    fentaNYL 50 mcg/mL injection 25 mcg    glucagon (human recombinant) injection 1 mg    glucagon (human recombinant) injection 1 mg    glucose chewable tablet 16 g    glucose chewable tablet 24 g    hydrALAZINE tablet 25 mg    insulin aspart U-100 pen 0-10 Units    insulin aspart U-100 pen 2-8 Units    insulin glargine U-100 (Lantus) pen 16 Units    losartan tablet 50 mg    methocarbamoL tablet 500 mg    morphine injection 2 mg    morphine injection 4 mg    nortriptyline capsule 50 mg    ondansetron disintegrating tablet 8 mg    oxacillin 12 g in  mL CONTINUOUS INFUSION    pantoprazole EC tablet 40 mg    polyethylene glycol packet 17 g    prochlorperazine injection Soln 5 mg    senna tablet 8.6 mg    sucralfate 100 mg/mL suspension 1 g    vitamin D 1000 units  "tablet 1,000 Units     Objective:     Vital Signs (Most Recent):  Temp: 98.3 °F (36.8 °C) (09/25/24 0753)  Pulse: 89 (09/25/24 0753)  Resp: 16 (09/25/24 0753)  BP: (!) 164/86 (09/25/24 0753)  SpO2: 95 % (09/25/24 0753) Vital Signs (24h Range):  Temp:  [97.8 °F (36.6 °C)-99.3 °F (37.4 °C)] 98.3 °F (36.8 °C)  Pulse:  [] 89  Resp:  [16-20] 16  SpO2:  [94 %-95 %] 95 %  BP: (150-183)/(67-86) 164/86     Weight: 93 kg (205 lb 0.4 oz)  Height: 5' 5" (165.1 cm)  Body mass index is 34.12 kg/m².      Intake/Output Summary (Last 24 hours) at 9/25/2024 0828  Last data filed at 9/25/2024 0505  Gross per 24 hour   Intake 720 ml   Output 1510 ml   Net -790 ml          Ortho/SPM Exam     AAOx4  NAD  Tachycardic  No increased WOB    RLE  Medial wound vac to good suction  Lateral side with fibrinous exudate and weeping ecchymosis distally  Compartments soft/compressible  Sensation diminished (at baseline)   Active toe dorsiflexion & plantarflexion  WWP. Brisk cap refill      Significant Labs:     Recent Labs   Lab 09/25/24  0233   WBC 9.44   RBC 2.79*   HGB 7.6*   HCT 25.0*      MCV 90   MCH 27.2   MCHC 30.4*         Significant Imaging: I have reviewed and interpreted all pertinent imaging results/findings.    "

## 2024-09-25 NOTE — ASSESSMENT & PLAN NOTE
BG goal 140-180    - Lantus (Insulin Glargine) 20 units daily (20% increase due to fasting blood glucose above goal)  - Novolog 2-8 with meals (Administer   2   units if patient eats 25% of meal, administer 4 units if the patient eats 50% of meal, administer 6 units if patient eats 75% of meal, and administer 8 units if the patient eats 100% of meal.) (Hold while NPO)  - [facility] SSI (150/25)  - BG checks AC/HS  - Hypoglycemia protocol in place    ** Please notify Endocrine for any change and/or advance in diet**  ** Please call Endocrine for any BG related issues **    Discharge Planning:   TBD. Please notify endocrinology prior to discharge.

## 2024-09-25 NOTE — ASSESSMENT & PLAN NOTE
- Hgb fluctuating and own to 7.6 on 9/25 from 8.3 on 9/24. No clinical signs of bleeding and will monitor.   - Anemia is likely due to acute blood loss which was from surgery. Most recent hemoglobin and hematocrit are listed below.  Recent Labs     09/23/24  0314 09/24/24  0257 09/25/24  0233   HGB 8.4* 8.3* 7.6*   HCT 26.6* 25.5* 25.0*       Plan  - Monitor serial CBC: Daily  - Transfuse PRBC if patient becomes hemodynamically unstable, symptomatic or H/H drops below 7/21.  - Patient has not received any PRBC transfusions to date  - Patient's anemia is currently stable and monitor. No indication for blood transfusion at this time.

## 2024-09-25 NOTE — PROGRESS NOTES
[facility]  Orthopedics  Progress Note    Attg Note:  Patient seen and examined.  I agree with the resident's assessment and plan.  Hardware removed.  Wound vac bilaterally.  Plastic surgery planning for OR tomorrow to try to see if he is a candidate for free flap to the wounds and look at the foot wound.  If not, or unsuccessful, would necessitate BKA.    Moe Liz MD      Patient Name: Cortes Schroeder  MRN: [id number]  Admission Date: 9/6/2024  Hospital Length of Stay: 19 days  Attending Provider: Raisa Kearns MD  Primary Care Provider: Andrew Sinclair Jr., MD  Follow-up For: Procedure(s) (LRB):  REPLACEMENT, WOUND VAC; white & black sponge (Right)  REMOVAL, HARDWARE, ANKLE (Right)    Post-Operative Day: 2 Days Post-Op  Subjective:     Principal Problem:Surgical wound breakdown    Principal Orthopedic Problem: s/p I&D and wound vac placement on 09/06, 09/20    Interval History: No issues overnight. POD2 from repeat I&D with hardware removal. Patient's pain remains well controlled. Tentative plan for plastics flap tomorrow. Enjoyed his wings yesterday. NPO at midnight.       Review of patient's allergies indicates:   Allergen Reactions    Invokana [canagliflozin] Anaphylaxis    Percocet [oxycodone-acetaminophen] Nausea Only and Hallucinations    Biaxin [clarithromycin]     Hydrocodone Other (See Comments)     Dizzy/nausea/hallucinations    Sulfa (sulfonamide antibiotics) Nausea Only and Rash       Current Facility-Administered Medications   Medication    acetaminophen tablet 1,000 mg    albuterol-ipratropium 2.5 mg-0.5 mg/3 mL nebulizer solution 3 mL    amLODIPine tablet 10 mg    calcium carbonate 200 mg calcium (500 mg) chewable tablet 1,000 mg    dextrose 10% bolus 125 mL 125 mL    dextrose 10% bolus 125 mL 125 mL    dextrose 10% bolus 125 mL 125 mL    dextrose 10% bolus 250 mL 250 mL    dextrose 10% bolus 250 mL 250 mL    dextrose 10% bolus 250 mL 250 mL    enoxaparin injection 40 mg     "fentaNYL 50 mcg/mL injection 25 mcg    glucagon (human recombinant) injection 1 mg    glucagon (human recombinant) injection 1 mg    glucose chewable tablet 16 g    glucose chewable tablet 24 g    hydrALAZINE tablet 25 mg    insulin aspart U-100 pen 0-10 Units    insulin aspart U-100 pen 2-8 Units    insulin glargine U-100 (Lantus) pen 16 Units    losartan tablet 50 mg    methocarbamoL tablet 500 mg    morphine injection 2 mg    morphine injection 4 mg    nortriptyline capsule 50 mg    ondansetron disintegrating tablet 8 mg    oxacillin 12 g in  mL CONTINUOUS INFUSION    pantoprazole EC tablet 40 mg    polyethylene glycol packet 17 g    prochlorperazine injection Soln 5 mg    senna tablet 8.6 mg    sucralfate 100 mg/mL suspension 1 g    vitamin D 1000 units tablet 1,000 Units     Objective:     Vital Signs (Most Recent):  Temp: 98.3 °F (36.8 °C) (09/25/24 0753)  Pulse: 89 (09/25/24 0753)  Resp: 16 (09/25/24 0753)  BP: (!) 164/86 (09/25/24 0753)  SpO2: 95 % (09/25/24 0753) Vital Signs (24h Range):  Temp:  [97.8 °F (36.6 °C)-99.3 °F (37.4 °C)] 98.3 °F (36.8 °C)  Pulse:  [] 89  Resp:  [16-20] 16  SpO2:  [94 %-95 %] 95 %  BP: (150-183)/(67-86) 164/86     Weight: 93 kg (205 lb 0.4 oz)  Height: 5' 5" (165.1 cm)  Body mass index is 34.12 kg/m².      Intake/Output Summary (Last 24 hours) at 9/25/2024 0828  Last data filed at 9/25/2024 0505  Gross per 24 hour   Intake 720 ml   Output 1510 ml   Net -790 ml          Ortho/SPM Exam     AAOx4  NAD  Tachycardic  No increased WOB    RLE  Medial wound vac to good suction  Lateral side with fibrinous exudate and weeping ecchymosis distally  Compartments soft/compressible  Sensation diminished (at baseline)   Active toe dorsiflexion & plantarflexion  WWP. Brisk cap refill      Significant Labs:     Recent Labs   Lab 09/25/24  0233   WBC 9.44   RBC 2.79*   HGB 7.6*   HCT 25.0*      MCV 90   MCH 27.2   MCHC 30.4*         Significant Imaging: I have reviewed and " interpreted all pertinent imaging results/findings.    Assessment/Plan:     * Surgical wound breakdown  Cortes Schroeder is a 64 y.o. male with PMH of DM, HTN  and right trimalleolar ankle fracture status post ex fix on 07/18 with subsequent definitive fixation on 07/26 admitted with right ankle wound dehiscence in the setting of uncontrolled diabetes.      He is s/p I&D of R ankle 09/06. Repeat I&D R medial ankle 09/09. 9/17 angiography and medial ankle wound vac exchange.   To OR 9/20 and 9/23 for repeat I&D and vac exchange. Hardware removed on 9/23.   Propeller flap with plastics 9/26.     Diet: NPO at midnight tonight   Pain control: multimodal regimen  PT/OT:  NWB RLE   DVT PPx: Lvx   Abx:  oxacillin continuous; ID following   Cultures:  ankle cx staph +    Dispo: OR with plastics Wednesday 9/26          Artur Galvan MD  Orthopedics  [facility] - Surgery

## 2024-09-25 NOTE — ASSESSMENT & PLAN NOTE
Bacteremia resolved.   - Blood cultures from admit grew MSSA. Repeat blood cultures done on 9/8 and 9/10 no growth.  - Infectious Disease consulted and patient placed on IV Oxacillin infusion as suspected source was right ankle surgical wound infection for bacteremia as grew MSSA from right ankle wound.   - Echo done without concern for vegetations.

## 2024-09-25 NOTE — SUBJECTIVE & OBJECTIVE
"Interval HPI:   No acute events overnight. Patient in room 542/542 A. Blood glucose stable. BG at and above goal on current insulin regimen (SSI, prandial, and basal insulin ). Steroid use- None.     Renal function-   Lab Results   Component Value Date    CREATININE 1.1 2024          Vasopressors-  None      Diet diabetic  Calorie      Eatin%  Nausea: No  Hypoglycemia and intervention: No  Fever: No  TPN and/or TF: No    BP (!) 164/86 (BP Location: Right arm, Patient Position: Lying)   Pulse 89   Temp 98.3 °F (36.8 °C) (Oral)   Resp 16   Ht 5' 5" (1.651 m)   Wt 93 kg (205 lb 0.4 oz)   SpO2 95%   BMI 34.12 kg/m²     Labs Reviewed and Include    Recent Labs   Lab 24  0233   *   CALCIUM 7.8*   ALBUMIN 1.6*   PROT 5.9*      K 4.2   CO2 21*      BUN 19   CREATININE 1.1   ALKPHOS 174*   ALT 5*   AST 17   BILITOT 0.2     Lab Results   Component Value Date    WBC 9.44 2024    HGB 7.6 (L) 2024    HCT 25.0 (L) 2024    MCV 90 2024     2024     No results for input(s): "TSH", "FREET4" in the last 168 hours.  Lab Results   Component Value Date    HGBA1C 8.5 (H) 2024       Nutritional status:   Body mass index is 34.12 kg/m².  Lab Results   Component Value Date    ALBUMIN 1.6 (L) 2024    ALBUMIN 1.7 (L) 2024    ALBUMIN 1.6 (L) 2024     Lab Results   Component Value Date    PREALBUMIN 3 (L) 2024    PREALBUMIN 13 (L) 2024       Estimated Creatinine Clearance: 72 mL/min (based on SCr of 1.1 mg/dL).    Accu-Checks  Recent Labs     24  1212 24  1354 24  1535 24  2103 24  0826 24  0949 24  1141 24  1554 24  1930 24  0733   POCTGLUCOSE 164* 175* 181* 95 77 82 88 130* 299* 260*       Current Medications and/or Treatments Impacting Glycemic Control  Immunotherapy:    Immunosuppressants       None          Steroids:   Hormones (From admission, onward)      None "          Pressors:    Autonomic Drugs (From admission, onward)      None          Hyperglycemia/Diabetes Medications:   Antihyperglycemics (From admission, onward)      Start     Stop Route Frequency Ordered    09/25/24 1000  insulin aspart U-100 pen 0-10 Units         -- SubQ Before meals & nightly PRN 09/25/24 0901    09/25/24 0900  insulin glargine U-100 (Lantus) pen 20 Units         -- SubQ Daily 09/25/24 0844    09/20/24 1130  insulin aspart U-100 pen 2-8 Units         -- SubQ 3 times daily with meals 09/20/24 0844

## 2024-09-25 NOTE — ASSESSMENT & PLAN NOTE
Closed trimalleolar fracture of right ankle s/p ORIF in 7/2024  Surgical site infection  63 yo male presenting with confusion and worsening redness, swelling and pain to right ankle. Patient with sepsis criteria on admit and right ankle wound felt to be source of infection.   - Ortho consulted and patient taken to OR 9/06/2024 for debridement of surgical wound with wound vac placed. Cultures taken and grew both MSSA and Group B streptococcus from wound. Patient taken back again to OR with Ortho with Dr. Liz and patient had another I&Dand wound vac change on 9/9/2024. Orthopedics took back to OR again on 9/20/2024 with Dr. Liz and underwent another I&D of right ankle and wound and replacement of wound vac. Patient taken back to OR with OR again on 9/23 with Dr. Liz and had another I&D and wound vac change with Orthopedics. Ortho noted anterolateral foot eschar developing. Distal lateral wound breakdown and ortho opened up majority of lateral wound and excised surrounding skin and subcutaneous tissue. Ortho removed all ankle hardware. Wound vacs placed medially and laterally.   - Orthopedics recommended Plastics evaluation for free flap and as part of evaluation recommended Vascular evaluation. Vascular evaluated patient and angiogram of right leg done and PTA done and revascularization of right PT/AT. Plastics plan propeller flap on 9/25/2024 but unfortunately due to presence of lateral and anterior wounds to right ankle no longer candidate for propeller flap so now will need free flap to cover both wounds. Plastics plans to take to OR on 9/26 for free flap to right ankle wounds. Plastics reports if unable to do free flap then patient's only option will be a BKA.   - Wound cultures -- MSSA and Group B strep from right ankle.   - Blood cultures -- MSSA on admit but repeat blood cultures negative.   - ID consulted and following and appreciate recs:  - Continue IV Oxacillin 12 g every 24 hours continuous  infusion. Plan for orals if hardware retained once completed.  - Anticipate 6 weeks of IV antibiotics from date of most recent washout.  - Continue PT/OT and non weight bearing to right lower extremity as per Ortho. Continue wound vac as per Ortho to surgical site.  - Pain controlled and continue multimodals.   - Continue Lovenox 40 mg subcutaneous daily for DVT prophylaxis.

## 2024-09-25 NOTE — PROGRESS NOTES
[facility] of the [facility]  Progress Note    Patient Name: Cortes Schroeder  MRN: [id number]  Patient Class: IP- Inpatient   Admission Date: 9/6/2024  Length of Stay: 19 days  Attending Physician: Raisa Kearns MD  Primary Care Provider: Andrew Sinclair Jr., MD        Subjective:     Principal Problem:Surgical wound breakdown        HPI:  Cortes Schroeder is a 64 y.o. M with PMHx of anxiety, T2DM, GERD, HLD, HTN who presented to ED for AMS. He had a fall in July that resulted in R trimalleolar fracture and L distal radius fracture. He had external fixation on 07/18 and then ORIF on 07/26 with Dr. Liz. He was prescribed clinda 300 mg on 08/28 for a ten day course. Patient was doing well when he acutely became altered and not acting like himself a few hours ago. Patient family member drove him here from [facility] for ortho consult. R medial ankle wound dehisced with redness and swelling around it. No CPM fever, cough, N/V/D or seizure.    In ED: T max 99.1. SIRS 2/4 with WBC 18 and . CMP notable for Na 127, Cr 1.7, . Phos 1.9. .3. BNP 73. Trop neg. A1c 8.5. R tib fib XR notes There are 2 abandoned anterior-posteriorly oriented pin tracks in the right mid tibial shaft.  On AP views, each of these pin tracks demonstrates a small faint internal density, possibly representing a sequestrum. Ortho and endocrine consulted. Given IV vanc and IV clindamycin. Given 2.7L IVFs. Admitted to hospital medicine for sepsis 2/2 infected hardware.     Overview/Hospital Course:  Cortes Schroeder is a 65 y/o male admitted to hospital medicine for sepsis related to right ankle from surgical wound infection in patient with recent ORIF of right ankle fracture done on 7/26/2024. Patient started on empiric IV Vancomycin and IV Cefepime and given IVF's as per sepsis protocol. Orthopedics consulted and patient taken to OR on 9/6/2024 and had irrigation debridement of right surgical incision and  placement of wound vac.Endocrine consulted to assist in management of his diabetes while in hospital. Blood cultures from admit returned with MSSA. Infectious Disease consulted. Wound culture returned positive for Group B streptococcus and MSSA. Echo done due to bacteremia without concern for vegetations. ID changed antibiotics to IV Oxacillin. Patient with new cough and worsening WBC. CTA chest negative for PE but notes small area of ground glass changes to RUL. Patient placed on 5 day course of po Doxycycline to treat as per ID and completed for possible pneumonia. Patient taken back to OR on 09/09/2024 with Ortho and had another irrigation and debridement and replacement of wound vac to right ankle. Plastics consulted for flap evaluation and recommended vascular evaluation. CTA right lower extremity done and showed moderate atherosclerosis and multifocal high grade stenosis throughout right calf arteries. Dietary consulted for malnutrition and started on Boost supplementation to maximize his nutrition for a flap and wound healing. Vascular surgery consulted per Plastics recs as they would like to pre-optimize blood flow prior to any free flap or pedicled propellar flap. Vascular recommended angiogram of right leg but patient developed DEEPALI. Nephrology consulted and suspected ATN related to contrast nephropathy. Patient placed on supportive care. DEEPALI resolved. Patyoanetn taken for unilateral angiogram by Vascular on 9/17 and underwent PTA of right posterior tibial artery and right anterior tibial artery. After revascularization of right leg pl;an to do flap but working on timing with Ortho and Plastics. Patient to go back to OR again on 9/20 for another irrigation and debridement and replacement of wound vac to right ankle wound by Ortho. Plastics plans propeller flap to right ankle on 9/25 and then will need to remain in hospital for 1 week after flap for dangling protocol and monitoring of flap per Plastics. Repeat  blood cultures from 9/8 and 9/10 no growth. Patient taken back to OR on 9/20 with Dr. Moe Liz and had another I&D of right ankle and wound and replacement of wound vac. Patient to remain non weight bearing post-op and Plastics plans flap placement while in hospital on 9/25. Patient to go back to OR on for another I&D and wound vac change on 9/23. Patient taken back to OR on 9/23 with Dr. Liz and had another I&D and wound vac change with Orthopedics. Ortho noted anterolateral foot eschar developing. Distal lateral wound breakdown and ortho opened up majority of lateral wound and excised surrounding skin and subcutaneous tissue. Ortho removed all ankle hardware. Wound vacs placed medially and laterally. Patient re-evaluated by Plastics on 9/25 and state due to lateral wound in addition to anterior wound no longer a candidate for propellar flap and plastic will need to do free flap to cover both wounds. Plastics concerned if no adequate vascular targets, will be unable to cover and BKA is only option. Patient not very happy. Patient to go to OR On 9/26 for free flap.     Interval History: Patient re-evaluated by Plastics this am and stated due to presence of lateral wound in addition to anterior wound no longer a candidate for propellar flap and Plastics states patient will need to do free flap to cover both wounds. Plastics concerned if no adequate vascular targets then will be unable to cover and BKA is only option. Patient not very happy about the news nor happy that his surgery not today. He states he has been here at [facility] too long and tired of being in hospital. I explained to him that we are trying to save his foot and leg and doing everything we can to make that happen and needs to be in hospital to get the proper care that he needs. Patient still not happy even after my explanation. He stated Sukhdeep did come last night and bring him wings that he liked and ate. Patient drinking [employer] iced coffee this  am. [employer] tech did take him down to [facility] today to get some fresh air. Patient to go to OR tomorrow with Plastics for free flap. Patient remains on IV Oxacillin for right ankle infection. Wound vacs in place to right ankle incisions. Patient reports pain in right ankle is controlled and 2/10. Labs reviewed. Hgb decreased down to 7.6 today from 8.3 yesterday. No clinical signs of bleeding but will need to monitor. Potassium improved to 4.2 today from 3.3 yesterday after oral replacement.     Review of Systems   Constitutional:  Negative for fever.   Respiratory:  Negative for shortness of breath.    Cardiovascular:  Negative for chest pain.   Gastrointestinal:  Negative for nausea and vomiting.   Musculoskeletal:  Positive for arthralgias (Right ankle).   Psychiatric/Behavioral:  Negative for confusion.      Objective:     Vital Signs (Most Recent):  Temp: 97.5 °F (36.4 °C) (09/25/24 1540)  Pulse: 79 (09/25/24 1540)  Resp: 19 (09/25/24 1540)  BP: 158/76 (09/25/24 1540)  SpO2: 96 % (09/25/24 1540) on room air Vital Signs (24h Range):  Temp:  [97.5 °F (36.4 °C)-98.4 °F (36.9 °C)] 97.5 °F (36.4 °C)  Pulse:  [79-95] 79  Resp:  [16-19] 19  SpO2:  [95 %-97 %] 96 %  BP: (150-165)/(67-86) 158/76     Weight: 93 kg (205 lb 0.4 oz)  Body mass index is 34.12 kg/m².    Intake/Output Summary (Last 24 hours) at 9/25/2024 1600  Last data filed at 9/25/2024 1454  Gross per 24 hour   Intake 237 ml   Output 1460 ml   Net -1223 ml         Physical Exam  Vitals and nursing note reviewed.   Constitutional:       General: He is awake. He is not in acute distress.     Appearance: Normal appearance. He is well-developed. He is obese.      Comments: Patient sitting up in bed in no distress and in no apparent pain. Patient in good spirits.    Eyes:      Conjunctiva/sclera: Conjunctivae normal.   Cardiovascular:      Rate and Rhythm: Normal rate and regular rhythm.      Heart sounds: Normal heart sounds. No murmur heard.  Pulmonary:       Effort: Pulmonary effort is normal. No respiratory distress.      Breath sounds: Normal breath sounds. No wheezing.   Abdominal:      General: Abdomen is flat. Bowel sounds are normal. There is no distension.      Palpations: Abdomen is soft.      Tenderness: There is no abdominal tenderness.   Musculoskeletal:      Left lower leg: No edema.      Comments: Wound vac in place to right ankle and ACE bandage overlying wound vac.    Skin:     General: Skin is warm.      Findings: No erythema.   Neurological:      Mental Status: He is alert and oriented to person, place, and time.   Psychiatric:         Mood and Affect: Mood normal.         Behavior: Behavior normal. Behavior is cooperative.         Thought Content: Thought content normal.         Judgment: Judgment normal.             Significant Labs: CBC:   Recent Labs   Lab 09/24/24  0257 09/25/24  0233   WBC 9.96 9.44   HGB 8.3* 7.6*   HCT 25.5* 25.0*   * 435     CMP:   Recent Labs   Lab 09/24/24 0257 09/25/24  0233    136   K 3.3* 4.2    108   CO2 20* 21*   GLU 70 217*   BUN 20 19   CREATININE 1.0 1.1   CALCIUM 7.9* 7.8*   PROT 6.2 5.9*   ALBUMIN 1.7* 1.6*   BILITOT 0.3 0.2   ALKPHOS 156* 174*   AST 18 17   ALT 5* 5*   ANIONGAP 11 7*     Blood Sugars (AccuCheck):    Recent Labs     09/24/24  1141 09/24/24  1554 09/24/24  1930 09/25/24  0733 09/25/24  1123 09/25/24  1542   POCTGLUCOSE 88 130* 299* 260* 221* 107       Significant Imaging: I have reviewed all pertinent imaging results/findings within the past 24 hours.    Assessment/Plan:      * Surgical wound breakdown  Closed trimalleolar fracture of right ankle s/p ORIF in 7/2024  Surgical site infection  65 yo male presenting with confusion and worsening redness, swelling and pain to right ankle. Patient with sepsis criteria on admit and right ankle wound felt to be source of infection.   - Ortho consulted and patient taken to OR 9/06/2024 for debridement of surgical wound with wound vac  placed. Cultures taken and grew both MSSA and Group B streptococcus from wound. Patient taken back again to OR with Ortho with Dr. Liz and patient had another I&Dand wound vac change on 9/9/2024. Orthopedics took back to OR again on 9/20/2024 with Dr. Liz and underwent another I&D of right ankle and wound and replacement of wound vac. Patient taken back to OR with OR again on 9/23 with Dr. Liz and had another I&D and wound vac change with Orthopedics. Ortho noted anterolateral foot eschar developing. Distal lateral wound breakdown and ortho opened up majority of lateral wound and excised surrounding skin and subcutaneous tissue. Ortho removed all ankle hardware. Wound vacs placed medially and laterally.   - Orthopedics recommended Plastics evaluation for free flap and as part of evaluation recommended Vascular evaluation. Vascular evaluated patient and angiogram of right leg done and PTA done and revascularization of right PT/AT. Plastics plan propeller flap on 9/25/2024 but unfortunately due to presence of lateral and anterior wounds to right ankle no longer candidate for propeller flap so now will need free flap to cover both wounds. Plastics plans to take to OR on 9/26 for free flap to right ankle wounds. Plastics reports if unable to do free flap then patient's only option will be a BKA.   - Wound cultures -- MSSA and Group B strep from right ankle.   - Blood cultures -- MSSA on admit but repeat blood cultures negative.   - ID consulted and following and appreciate recs:  - Continue IV Oxacillin 12 g every 24 hours continuous infusion. Plan for orals if hardware retained once completed.  - Anticipate 6 weeks of IV antibiotics from date of most recent washout.  - Continue PT/OT and non weight bearing to right lower extremity as per Ortho. Continue wound vac as per Ortho to surgical site.  - Pain controlled and continue multimodals.   - Continue Lovenox 40 mg subcutaneous daily for DVT prophylaxis.      MSSA bacteremia  Bacteremia resolved.   - Blood cultures from admit grew MSSA. Repeat blood cultures done on 9/8 and 9/10 no growth.  - Infectious Disease consulted and patient placed on IV Oxacillin infusion as suspected source was right ankle surgical wound infection for bacteremia as grew MSSA from right ankle wound.   - Echo done without concern for vegetations.    Uncontrolled type 2 diabetes mellitus with hyperglycemia  Patient's FSGs controlled. Endocrine consulted and managing patient's diabetes in hospital and appreciate assistance. Patient on insulin pump at [facility] but not in hospital and on basal/prandial insulin in hospital as per Endocrine. Appreciate Endocrine assistance on this case.   Last A1c reviewed-   Lab Results   Component Value Date    LABA1C 10.8 (H) 08/15/2016    HGBA1C 8.5 (H) 09/06/2024     Most recent fingerstick glucose reviewed-   Recent Labs   Lab 09/24/24  1930 09/25/24  0733 09/25/24  1123 09/25/24  1542   POCTGLUCOSE 299* 260* 221* 107       Current correctional scale  Low  Maintain anti-hyperglycemic dose as follows-   Antihyperglycemics (From admission, onward)      Start     Stop Route Frequency Ordered    09/25/24 1000  insulin aspart U-100 pen 0-10 Units         -- SubQ Before meals & nightly PRN 09/25/24 0901    09/25/24 0900  insulin glargine U-100 (Lantus) pen 20 Units         -- SubQ Daily 09/25/24 0844    09/20/24 1130  insulin aspart U-100 pen 2-8 Units         -- SubQ 3 times daily with meals 09/20/24 0844           - Endocrine consulted:  - At this time, recommend that the patient remain off of his pump since he cannot be controlled in the Auto mode. Patient does not interact with pump frequently and relies on the auto mode algorithm.   - Insulin dosing as per Endocrine recommendations.   - Hold oral antihyperglycemics during hospitalization   - Monitor blood sugars with meals and at bedtime in hospital.  - Target blood sugars 140-180 in hospital.  - Diabetic diet.      Gastroesophageal reflux disease without esophagitis  Controlled. Continue Carafate every 6 hours po to treat.       Airway malacia  Noted on CT scan of chest. Patient with no acute issues and incidentally noted on CT scan of chest. Patient asymptomatic.       Acute blood loss anemia  - Hgb fluctuating and own to 7.6 on 9/25 from 8.3 on 9/24. No clinical signs of bleeding and will monitor.   - Anemia is likely due to acute blood loss which was from surgery. Most recent hemoglobin and hematocrit are listed below.  Recent Labs     09/23/24  0314 09/24/24  0257 09/25/24  0233   HGB 8.4* 8.3* 7.6*   HCT 26.6* 25.5* 25.0*       Plan  - Monitor serial CBC: Daily  - Transfuse PRBC if patient becomes hemodynamically unstable, symptomatic or H/H drops below 7/21.  - Patient has not received any PRBC transfusions to date  - Patient's anemia is currently stable and monitor. No indication for blood transfusion at this time.     PAD (peripheral artery disease)  Present on admit. Patient noted to have high grade stenosis on CTA of right leg. Vascular surgery consulted and patient taken to OR and had unilateral angiogram of right leg and had PT/AT artery stenosis on 9/17 and underwent PTA of right PT/AT arteries. Appreciate Vascular surgery assistance on case.       Thrombocytosis  Improving with treatment of infection. Present on admit and related to infection causing increase in platelet count. Monitor daily CBC.       On pre-exposure prophylaxis for HIV  Patient on Truvada as outpatient but held in hospital due to elevated AST and ALT that has resolved. Plan to resume Truvada on discharge.     Class 1 obesity due to excess calories with serious comorbidity and body mass index (BMI) of 34.0 to 34.9 in adult  Body mass index is 34.12 kg/m². Morbid obesity complicates all aspects of disease management from diagnostic modalities to treatment. Weight loss encouraged and health benefits explained to patient.       VTE Risk  Mitigation (From admission, onward)           Ordered     enoxaparin injection 40 mg  Daily         09/18/24 0808     IP VTE HIGH RISK PATIENT  Once         09/06/24 1735                    Discharge Planning   JAYLEEN: 10/3/2024     Code Status: Full Code   Is the patient medically ready for discharge?:     Reason for patient still in hospital (select all that apply): Patient trending condition  Discharge Plan A: Post acute facility          Raisa Kearns MD  Department of [facility] Medicine   [facility] - Surgery

## 2024-09-25 NOTE — NURSING
Patient AAOx4, VSS,  rested well during shift with NADN. Bed in lowest position, call light in reach along with personal items. Plan of care ongoing.

## 2024-09-25 NOTE — PROGRESS NOTES
[facility]  Endocrinology  Progress Note    Admit Date: 2024     Reason for Consult: Management of T2DM, Hyperglycemia      Surgical Procedure and Date: S/P irrigation debridement of RLE on 2024    Diabetes diagnosis year:      Home Diabetes Medications:    Humalog via OmniPod insulin pump  Mounjaro 10 mg weekly     Current pump settings:  Basal 12 am to 2.3 and 6 am to 1.55  ICR to 3 at 12 am, 2 at 4 pm  ISF to 1:20   AIT 3 hrs  Target 110-120        Patient had anaphylaxis reaction to SGLT2 inhibitors specifically Invokana     How often checking glucose at home? Dexcom G6   BG readings on regimen: Average 210's per Dexcom  Hypoglycemia on the regimen?  Yes  Missed doses on regimen?  No     Diabetes Complications include:     Hyperglycemia and Diabetic peripheral neuropathy         Complicating diabetes co morbidities:   HLD, HTN, Obesity         HPI: 64 y.o. male presents to the ED w/ complaint of altered mental status. Hx of R ankle fracture with surgery over a month ago. Patient now presents with sepsis 2/2 R ankle infection s/p ORIF on . Started on IV vanc and cefepime. Given IVFs. Blood cultures pending. Brought to OR with ortho on  for irrigation debridement of RLE. Endocrine following for BG and type 2 diabetes.     Lab Results   Component Value Date    LABA1C 10.8 (H) 08/15/2016    HGBA1C 8.5 (H) 2024         Interval HPI:   No acute events overnight. Patient in room 542/542 A. Blood glucose stable. BG at and above goal on current insulin regimen (SSI, prandial, and basal insulin ). Steroid use- None.    Renal function-   Lab Results   Component Value Date    CREATININE 1.1 2024          Vasopressors-  None      Diet diabetic  Calorie      Eatin%  Nausea: No  Hypoglycemia and intervention: No  Fever: No  TPN and/or TF: No    BP (!) 164/86 (BP Location: Right arm, Patient Position: Lying)   Pulse 89   Temp 98.3 °F (36.8 °C) (Oral)   Resp 16   Ht 5'  "5" (1.651 m)   Wt 93 kg (205 lb 0.4 oz)   SpO2 95%   BMI 34.12 kg/m²     Labs Reviewed and Include    Recent Labs   Lab 09/25/24  0233   *   CALCIUM 7.8*   ALBUMIN 1.6*   PROT 5.9*      K 4.2   CO2 21*      BUN 19   CREATININE 1.1   ALKPHOS 174*   ALT 5*   AST 17   BILITOT 0.2     Lab Results   Component Value Date    WBC 9.44 09/25/2024    HGB 7.6 (L) 09/25/2024    HCT 25.0 (L) 09/25/2024    MCV 90 09/25/2024     09/25/2024     No results for input(s): "TSH", "FREET4" in the last 168 hours.  Lab Results   Component Value Date    HGBA1C 8.5 (H) 09/06/2024       Nutritional status:   Body mass index is 34.12 kg/m².  Lab Results   Component Value Date    ALBUMIN 1.6 (L) 09/25/2024    ALBUMIN 1.7 (L) 09/24/2024    ALBUMIN 1.6 (L) 09/23/2024     Lab Results   Component Value Date    PREALBUMIN 3 (L) 09/06/2024    PREALBUMIN 13 (L) 07/18/2024       Estimated Creatinine Clearance: 72 mL/min (based on SCr of 1.1 mg/dL).    Accu-Checks  Recent Labs     09/23/24  1212 09/23/24  1354 09/23/24  1535 09/23/24  2103 09/24/24  0826 09/24/24  0949 09/24/24  1141 09/24/24  1554 09/24/24  1930 09/25/24  0733   POCTGLUCOSE 164* 175* 181* 95 77 82 88 130* 299* 260*       Current Medications and/or Treatments Impacting Glycemic Control  Immunotherapy:    Immunosuppressants       None          Steroids:   Hormones (From admission, onward)      None          Pressors:    Autonomic Drugs (From admission, onward)      None          Hyperglycemia/Diabetes Medications:   Antihyperglycemics (From admission, onward)      Start     Stop Route Frequency Ordered    09/25/24 1000  insulin aspart U-100 pen 0-10 Units         -- SubQ Before meals & nightly PRN 09/25/24 0901    09/25/24 0900  insulin glargine U-100 (Lantus) pen 20 Units         -- SubQ Daily 09/25/24 0844    09/20/24 1130  insulin aspart U-100 pen 2-8 Units         -- SubQ 3 times daily with meals 09/20/24 0844            ASSESSMENT and " PLAN    Cardiac/Vascular  Hyperlipidemia  On statin per ADA guidelines       Endocrine  Uncontrolled type 2 diabetes mellitus with hyperglycemia  BG goal 140-180    - Lantus (Insulin Glargine) 20 units daily (20% increase due to fasting blood glucose above goal)  - Novolog 2-8 with meals (Administer   2   units if patient eats 25% of meal, administer 4 units if the patient eats 50% of meal, administer 6 units if patient eats 75% of meal, and administer 8 units if the patient eats 100% of meal.) (Hold while NPO)  - [facility] SSI (150/25)  - BG checks AC/HS  - Hypoglycemia protocol in place    ** Please notify Endocrine for any change and/or advance in diet**  ** Please call Endocrine for any BG related issues **    Discharge Planning:   TBD. Please notify endocrinology prior to discharge.        Orthopedic  * Surgical wound breakdown  Optimize BG control to improve wound healing  Managed per primary team              Payton Vora PA-C  Endocrinology  [facility]

## 2024-09-26 ENCOUNTER — ANESTHESIA (OUTPATIENT)
Dept: SURGERY | Facility: HOSPITAL | Age: 64
DRG: 856 | End: 2024-09-26
Payer: COMMERCIAL

## 2024-09-26 LAB
ABO + RH BLD: NORMAL
ALBUMIN SERPL BCP-MCNC: 1.6 G/DL (ref 3.5–5.2)
ALP SERPL-CCNC: 168 U/L (ref 55–135)
ALT SERPL W/O P-5'-P-CCNC: 6 U/L (ref 10–44)
ANION GAP SERPL CALC-SCNC: 10 MMOL/L (ref 8–16)
APTT PPP: 29.7 SEC (ref 21–32)
AST SERPL-CCNC: 16 U/L (ref 10–40)
BILIRUB SERPL-MCNC: 0.3 MG/DL (ref 0.1–1)
BLD GP AB SCN CELLS X3 SERPL QL: NORMAL
BLD PROD TYP BPU: NORMAL
BLD PROD TYP BPU: NORMAL
BLOOD UNIT EXPIRATION DATE: NORMAL
BLOOD UNIT EXPIRATION DATE: NORMAL
BLOOD UNIT TYPE CODE: 5100
BLOOD UNIT TYPE CODE: 5100
BLOOD UNIT TYPE: NORMAL
BLOOD UNIT TYPE: NORMAL
BUN SERPL-MCNC: 18 MG/DL (ref 8–23)
CALCIUM SERPL-MCNC: 7.8 MG/DL (ref 8.7–10.5)
CHLORIDE SERPL-SCNC: 110 MMOL/L (ref 95–110)
CO2 SERPL-SCNC: 20 MMOL/L (ref 23–29)
CODING SYSTEM: NORMAL
CODING SYSTEM: NORMAL
CREAT SERPL-MCNC: 1.1 MG/DL (ref 0.5–1.4)
CROSSMATCH INTERPRETATION: NORMAL
CROSSMATCH INTERPRETATION: NORMAL
DISPENSE STATUS: NORMAL
DISPENSE STATUS: NORMAL
ERYTHROCYTE [DISTWIDTH] IN BLOOD BY AUTOMATED COUNT: 17.2 % (ref 11.5–14.5)
EST. GFR  (NO RACE VARIABLE): >60 ML/MIN/1.73 M^2
GLUCOSE SERPL-MCNC: 108 MG/DL (ref 70–110)
HCT VFR BLD AUTO: 24.5 % (ref 40–54)
HGB BLD-MCNC: 7.8 G/DL (ref 14–18)
INR PPP: 1 (ref 0.8–1.2)
MCH RBC QN AUTO: 27.9 PG (ref 27–31)
MCHC RBC AUTO-ENTMCNC: 31.8 G/DL (ref 32–36)
MCV RBC AUTO: 88 FL (ref 82–98)
NUM UNITS TRANS PACKED RBC: NORMAL
NUM UNITS TRANS PACKED RBC: NORMAL
PLATELET # BLD AUTO: 452 K/UL (ref 150–450)
PMV BLD AUTO: 9.9 FL (ref 9.2–12.9)
POCT GLUCOSE: 152 MG/DL (ref 70–110)
POCT GLUCOSE: 165 MG/DL (ref 70–110)
POCT GLUCOSE: 173 MG/DL (ref 70–110)
POCT GLUCOSE: 208 MG/DL (ref 70–110)
POTASSIUM SERPL-SCNC: 4.1 MMOL/L (ref 3.5–5.1)
PROT SERPL-MCNC: 6 G/DL (ref 6–8.4)
PROTHROMBIN TIME: 11 SEC (ref 9–12.5)
RBC # BLD AUTO: 2.8 M/UL (ref 4.6–6.2)
SODIUM SERPL-SCNC: 140 MMOL/L (ref 136–145)
SPECIMEN OUTDATE: NORMAL
WBC # BLD AUTO: 10.16 K/UL (ref 3.9–12.7)

## 2024-09-26 PROCEDURE — 25000003 PHARM REV CODE 250: Performed by: STUDENT IN AN ORGANIZED HEALTH CARE EDUCATION/TRAINING PROGRAM

## 2024-09-26 PROCEDURE — P9016 RBC LEUKOCYTES REDUCED: HCPCS | Performed by: HOSPITALIST

## 2024-09-26 PROCEDURE — 63600175 PHARM REV CODE 636 W HCPCS: Performed by: STUDENT IN AN ORGANIZED HEALTH CARE EDUCATION/TRAINING PROGRAM

## 2024-09-26 PROCEDURE — 15756 FREE MYO/SKIN FLAP MICROVASC: CPT | Mod: ,,, | Performed by: SURGERY

## 2024-09-26 PROCEDURE — 15004 WOUND PREP F/N/HF/G: CPT | Mod: 51,,, | Performed by: SURGERY

## 2024-09-26 PROCEDURE — 85027 COMPLETE CBC AUTOMATED: CPT | Performed by: STUDENT IN AN ORGANIZED HEALTH CARE EDUCATION/TRAINING PROGRAM

## 2024-09-26 PROCEDURE — 71000033 HC RECOVERY, INTIAL HOUR: Performed by: SURGERY

## 2024-09-26 PROCEDURE — 80053 COMPREHEN METABOLIC PANEL: CPT | Performed by: STUDENT IN AN ORGANIZED HEALTH CARE EDUCATION/TRAINING PROGRAM

## 2024-09-26 PROCEDURE — 85730 THROMBOPLASTIN TIME PARTIAL: CPT | Performed by: STUDENT IN AN ORGANIZED HEALTH CARE EDUCATION/TRAINING PROGRAM

## 2024-09-26 PROCEDURE — 15860 IV NJX TST VASC FLO FLAP/GRF: CPT | Mod: 51,,, | Performed by: SURGERY

## 2024-09-26 PROCEDURE — 21400001 HC TELEMETRY ROOM

## 2024-09-26 PROCEDURE — 36000709 HC OR TIME LEV III EA ADD 15 MIN: Performed by: SURGERY

## 2024-09-26 PROCEDURE — 27201423 OPTIME MED/SURG SUP & DEVICES STERILE SUPPLY: Performed by: SURGERY

## 2024-09-26 PROCEDURE — 85610 PROTHROMBIN TIME: CPT | Performed by: STUDENT IN AN ORGANIZED HEALTH CARE EDUCATION/TRAINING PROGRAM

## 2024-09-26 PROCEDURE — C1769 GUIDE WIRE: HCPCS | Performed by: SURGERY

## 2024-09-26 PROCEDURE — 86900 BLOOD TYPING SEROLOGIC ABO: CPT | Performed by: STUDENT IN AN ORGANIZED HEALTH CARE EDUCATION/TRAINING PROGRAM

## 2024-09-26 PROCEDURE — 25000003 PHARM REV CODE 250: Performed by: NURSE ANESTHETIST, CERTIFIED REGISTERED

## 2024-09-26 PROCEDURE — 37000009 HC ANESTHESIA EA ADD 15 MINS: Performed by: SURGERY

## 2024-09-26 PROCEDURE — 63600175 PHARM REV CODE 636 W HCPCS: Performed by: SURGERY

## 2024-09-26 PROCEDURE — 37000008 HC ANESTHESIA 1ST 15 MINUTES: Performed by: SURGERY

## 2024-09-26 PROCEDURE — 99232 SBSQ HOSP IP/OBS MODERATE 35: CPT | Mod: ,,,

## 2024-09-26 PROCEDURE — 0JB70ZZ EXCISION OF BACK SUBCUTANEOUS TISSUE AND FASCIA, OPEN APPROACH: ICD-10-PCS | Performed by: SURGERY

## 2024-09-26 PROCEDURE — 86920 COMPATIBILITY TEST SPIN: CPT | Performed by: HOSPITALIST

## 2024-09-26 PROCEDURE — 63600175 PHARM REV CODE 636 W HCPCS: Performed by: ANESTHESIOLOGY

## 2024-09-26 PROCEDURE — 25000003 PHARM REV CODE 250: Performed by: SURGERY

## 2024-09-26 PROCEDURE — P9045 ALBUMIN (HUMAN), 5%, 250 ML: HCPCS | Mod: JZ,JG | Performed by: NURSE ANESTHETIST, CERTIFIED REGISTERED

## 2024-09-26 PROCEDURE — 30233N1 TRANSFUSION OF NONAUTOLOGOUS RED BLOOD CELLS INTO PERIPHERAL VEIN, PERCUTANEOUS APPROACH: ICD-10-PCS | Performed by: SURGERY

## 2024-09-26 PROCEDURE — 97606 NEG PRS WND THER DME>50 SQCM: CPT | Mod: ,,, | Performed by: SURGERY

## 2024-09-26 PROCEDURE — 71000016 HC POSTOP RECOV ADDL HR: Performed by: SURGERY

## 2024-09-26 PROCEDURE — 63600175 PHARM REV CODE 636 W HCPCS: Performed by: NURSE ANESTHETIST, CERTIFIED REGISTERED

## 2024-09-26 PROCEDURE — 86901 BLOOD TYPING SEROLOGIC RH(D): CPT | Performed by: STUDENT IN AN ORGANIZED HEALTH CARE EDUCATION/TRAINING PROGRAM

## 2024-09-26 PROCEDURE — 36000708 HC OR TIME LEV III 1ST 15 MIN: Performed by: SURGERY

## 2024-09-26 PROCEDURE — 71000015 HC POSTOP RECOV 1ST HR: Performed by: SURGERY

## 2024-09-26 DEVICE — COUPLER MICROVAS ANSTMS 3.0MM: Type: IMPLANTABLE DEVICE | Site: ANKLE | Status: FUNCTIONAL

## 2024-09-26 DEVICE — COUPLER 2.0MM: Type: IMPLANTABLE DEVICE | Site: ANKLE | Status: FUNCTIONAL

## 2024-09-26 RX ORDER — LIDOCAINE HYDROCHLORIDE 20 MG/ML
INJECTION, SOLUTION EPIDURAL; INFILTRATION; INTRACAUDAL; PERINEURAL
Status: DISCONTINUED | OUTPATIENT
Start: 2024-09-26 | End: 2024-09-26

## 2024-09-26 RX ORDER — FENTANYL CITRATE 50 UG/ML
INJECTION, SOLUTION INTRAMUSCULAR; INTRAVENOUS
Status: DISCONTINUED | OUTPATIENT
Start: 2024-09-26 | End: 2024-09-26

## 2024-09-26 RX ORDER — ONDANSETRON HYDROCHLORIDE 2 MG/ML
INJECTION, SOLUTION INTRAVENOUS
Status: DISCONTINUED | OUTPATIENT
Start: 2024-09-26 | End: 2024-09-26

## 2024-09-26 RX ORDER — LIDOCAINE HYDROCHLORIDE 40 MG/ML
INJECTION, SOLUTION RETROBULBAR
Status: DISCONTINUED | OUTPATIENT
Start: 2024-09-26 | End: 2024-09-26 | Stop reason: HOSPADM

## 2024-09-26 RX ORDER — HALOPERIDOL 5 MG/ML
0.5 INJECTION INTRAMUSCULAR EVERY 10 MIN PRN
Status: DISCONTINUED | OUTPATIENT
Start: 2024-09-26 | End: 2024-09-27 | Stop reason: HOSPADM

## 2024-09-26 RX ORDER — SODIUM CHLORIDE, SODIUM LACTATE, POTASSIUM CHLORIDE, CALCIUM CHLORIDE 600; 310; 30; 20 MG/100ML; MG/100ML; MG/100ML; MG/100ML
INJECTION, SOLUTION INTRAVENOUS CONTINUOUS
Status: CANCELLED | OUTPATIENT
Start: 2024-09-26

## 2024-09-26 RX ORDER — ROCURONIUM BROMIDE 10 MG/ML
INJECTION, SOLUTION INTRAVENOUS
Status: DISCONTINUED | OUTPATIENT
Start: 2024-09-26 | End: 2024-09-26

## 2024-09-26 RX ORDER — DEXMEDETOMIDINE HYDROCHLORIDE 100 UG/ML
INJECTION, SOLUTION INTRAVENOUS
Status: DISCONTINUED | OUTPATIENT
Start: 2024-09-26 | End: 2024-09-26

## 2024-09-26 RX ORDER — PHENYLEPHRINE HYDROCHLORIDE 10 MG/ML
INJECTION INTRAVENOUS
Status: DISCONTINUED | OUTPATIENT
Start: 2024-09-26 | End: 2024-09-26

## 2024-09-26 RX ORDER — KETAMINE HCL IN 0.9 % NACL 50 MG/5 ML
SYRINGE (ML) INTRAVENOUS
Status: DISCONTINUED | OUTPATIENT
Start: 2024-09-26 | End: 2024-09-26

## 2024-09-26 RX ORDER — MIDAZOLAM HYDROCHLORIDE 1 MG/ML
INJECTION INTRAMUSCULAR; INTRAVENOUS
Status: DISCONTINUED | OUTPATIENT
Start: 2024-09-26 | End: 2024-09-26

## 2024-09-26 RX ORDER — GLUCAGON 1 MG
1 KIT INJECTION
Status: DISCONTINUED | OUTPATIENT
Start: 2024-09-26 | End: 2024-09-27 | Stop reason: HOSPADM

## 2024-09-26 RX ORDER — HEPARIN SODIUM 5000 [USP'U]/ML
INJECTION, SOLUTION INTRAVENOUS; SUBCUTANEOUS
Status: DISCONTINUED | OUTPATIENT
Start: 2024-09-26 | End: 2024-09-26 | Stop reason: HOSPADM

## 2024-09-26 RX ORDER — HYDROCODONE BITARTRATE AND ACETAMINOPHEN 500; 5 MG/1; MG/1
TABLET ORAL
Status: DISCONTINUED | OUTPATIENT
Start: 2024-09-26 | End: 2024-10-04 | Stop reason: HOSPADM

## 2024-09-26 RX ORDER — DEXAMETHASONE SODIUM PHOSPHATE 4 MG/ML
INJECTION, SOLUTION INTRA-ARTICULAR; INTRALESIONAL; INTRAMUSCULAR; INTRAVENOUS; SOFT TISSUE
Status: DISCONTINUED | OUTPATIENT
Start: 2024-09-26 | End: 2024-09-26

## 2024-09-26 RX ORDER — INDOCYANINE GREEN AND WATER 25 MG
KIT INJECTION
Status: DISCONTINUED | OUTPATIENT
Start: 2024-09-26 | End: 2024-09-26

## 2024-09-26 RX ORDER — HYDROMORPHONE HYDROCHLORIDE 1 MG/ML
0.2 INJECTION, SOLUTION INTRAMUSCULAR; INTRAVENOUS; SUBCUTANEOUS EVERY 5 MIN PRN
Status: DISCONTINUED | OUTPATIENT
Start: 2024-09-26 | End: 2024-09-27 | Stop reason: HOSPADM

## 2024-09-26 RX ORDER — EPHEDRINE SULFATE 50 MG/ML
INJECTION, SOLUTION INTRAVENOUS
Status: DISCONTINUED | OUTPATIENT
Start: 2024-09-26 | End: 2024-09-26

## 2024-09-26 RX ORDER — INSULIN ASPART 100 [IU]/ML
0-10 INJECTION, SOLUTION INTRAVENOUS; SUBCUTANEOUS EVERY 4 HOURS PRN
Status: DISCONTINUED | OUTPATIENT
Start: 2024-09-26 | End: 2024-09-27

## 2024-09-26 RX ORDER — INSULIN GLARGINE 100 [IU]/ML
17 INJECTION, SOLUTION SUBCUTANEOUS DAILY
Status: DISCONTINUED | OUTPATIENT
Start: 2024-09-26 | End: 2024-09-27

## 2024-09-26 RX ORDER — INSULIN ASPART 100 [IU]/ML
4-8 INJECTION, SOLUTION INTRAVENOUS; SUBCUTANEOUS
Status: DISCONTINUED | OUTPATIENT
Start: 2024-09-26 | End: 2024-09-30

## 2024-09-26 RX ORDER — OXYCODONE HYDROCHLORIDE 5 MG/1
5 TABLET ORAL
Status: DISCONTINUED | OUTPATIENT
Start: 2024-09-26 | End: 2024-09-27 | Stop reason: HOSPADM

## 2024-09-26 RX ORDER — CALCIUM CHLORIDE INJECTION 100 MG/ML
INJECTION, SOLUTION INTRAVENOUS
Status: DISCONTINUED | OUTPATIENT
Start: 2024-09-26 | End: 2024-09-26

## 2024-09-26 RX ORDER — ONDANSETRON HYDROCHLORIDE 2 MG/ML
4 INJECTION, SOLUTION INTRAVENOUS DAILY PRN
Status: DISCONTINUED | OUTPATIENT
Start: 2024-09-26 | End: 2024-09-27 | Stop reason: HOSPADM

## 2024-09-26 RX ORDER — HEPARIN SODIUM 1000 [USP'U]/ML
INJECTION, SOLUTION INTRAVENOUS; SUBCUTANEOUS
Status: DISCONTINUED | OUTPATIENT
Start: 2024-09-26 | End: 2024-09-26

## 2024-09-26 RX ORDER — ALBUMIN HUMAN 50 G/1000ML
SOLUTION INTRAVENOUS
Status: DISCONTINUED | OUTPATIENT
Start: 2024-09-26 | End: 2024-09-26

## 2024-09-26 RX ORDER — PROPOFOL 10 MG/ML
VIAL (ML) INTRAVENOUS
Status: DISCONTINUED | OUTPATIENT
Start: 2024-09-26 | End: 2024-09-26

## 2024-09-26 RX ORDER — KETAMINE HYDROCHLORIDE 100 MG/ML
INJECTION, SOLUTION INTRAMUSCULAR; INTRAVENOUS
Status: DISCONTINUED | OUTPATIENT
Start: 2024-09-26 | End: 2024-09-26

## 2024-09-26 RX ADMIN — Medication 10 MG: at 08:09

## 2024-09-26 RX ADMIN — SUGAMMADEX 400 MG: 100 INJECTION, SOLUTION INTRAVENOUS at 09:09

## 2024-09-26 RX ADMIN — FENTANYL CITRATE 50 MCG: 50 INJECTION, SOLUTION INTRAMUSCULAR; INTRAVENOUS at 09:09

## 2024-09-26 RX ADMIN — SODIUM CHLORIDE, SODIUM GLUCONATE, SODIUM ACETATE, POTASSIUM CHLORIDE, MAGNESIUM CHLORIDE, SODIUM PHOSPHATE, DIBASIC, AND POTASSIUM PHOSPHATE: .53; .5; .37; .037; .03; .012; .00082 INJECTION, SOLUTION INTRAVENOUS at 01:09

## 2024-09-26 RX ADMIN — Medication 10 MG: at 06:09

## 2024-09-26 RX ADMIN — FENTANYL CITRATE 50 MCG: 50 INJECTION, SOLUTION INTRAMUSCULAR; INTRAVENOUS at 05:09

## 2024-09-26 RX ADMIN — FENTANYL CITRATE 50 MCG: 50 INJECTION, SOLUTION INTRAMUSCULAR; INTRAVENOUS at 02:09

## 2024-09-26 RX ADMIN — ROCURONIUM BROMIDE 20 MG: 10 INJECTION, SOLUTION INTRAVENOUS at 08:09

## 2024-09-26 RX ADMIN — Medication 10 MG: at 05:09

## 2024-09-26 RX ADMIN — ALBUMIN (HUMAN) 500 ML: 12.5 SOLUTION INTRAVENOUS at 03:09

## 2024-09-26 RX ADMIN — Medication 20 MG: at 03:09

## 2024-09-26 RX ADMIN — HYDROMORPHONE HYDROCHLORIDE 0.2 MG: 1 INJECTION, SOLUTION INTRAMUSCULAR; INTRAVENOUS; SUBCUTANEOUS at 11:09

## 2024-09-26 RX ADMIN — FENTANYL CITRATE 50 MCG: 50 INJECTION, SOLUTION INTRAMUSCULAR; INTRAVENOUS at 04:09

## 2024-09-26 RX ADMIN — OXACILLIN 12 G: 2 INJECTION, POWDER, FOR SOLUTION INTRAMUSCULAR; INTRAVENOUS at 05:09

## 2024-09-26 RX ADMIN — METHOCARBAMOL 500 MG: 500 TABLET ORAL at 11:09

## 2024-09-26 RX ADMIN — EPHEDRINE SULFATE 10 MG: 50 INJECTION INTRAVENOUS at 08:09

## 2024-09-26 RX ADMIN — PANTOPRAZOLE SODIUM 40 MG: 40 TABLET, DELAYED RELEASE ORAL at 08:09

## 2024-09-26 RX ADMIN — ROCURONIUM BROMIDE 20 MG: 10 INJECTION, SOLUTION INTRAVENOUS at 05:09

## 2024-09-26 RX ADMIN — SODIUM CHLORIDE, POTASSIUM CHLORIDE, SODIUM LACTATE AND CALCIUM CHLORIDE: 600; 310; 30; 20 INJECTION, SOLUTION INTRAVENOUS at 11:09

## 2024-09-26 RX ADMIN — Medication 10 MG: at 07:09

## 2024-09-26 RX ADMIN — HYDROMORPHONE HYDROCHLORIDE 0.2 MG: 1 INJECTION, SOLUTION INTRAMUSCULAR; INTRAVENOUS; SUBCUTANEOUS at 10:09

## 2024-09-26 RX ADMIN — LIDOCAINE HYDROCHLORIDE 100 MG: 20 INJECTION, SOLUTION EPIDURAL; INFILTRATION; INTRACAUDAL; PERINEURAL at 02:09

## 2024-09-26 RX ADMIN — Medication 10 MG: at 09:09

## 2024-09-26 RX ADMIN — ROCURONIUM BROMIDE 20 MG: 10 INJECTION, SOLUTION INTRAVENOUS at 07:09

## 2024-09-26 RX ADMIN — LOSARTAN POTASSIUM 50 MG: 25 TABLET, FILM COATED ORAL at 08:09

## 2024-09-26 RX ADMIN — INSULIN GLARGINE 17 UNITS: 100 INJECTION, SOLUTION SUBCUTANEOUS at 08:09

## 2024-09-26 RX ADMIN — FENTANYL CITRATE 50 MCG: 50 INJECTION, SOLUTION INTRAMUSCULAR; INTRAVENOUS at 06:09

## 2024-09-26 RX ADMIN — SODIUM CHLORIDE, POTASSIUM CHLORIDE, SODIUM LACTATE AND CALCIUM CHLORIDE: 600; 310; 30; 20 INJECTION, SOLUTION INTRAVENOUS at 12:09

## 2024-09-26 RX ADMIN — DEXMEDETOMIDINE 8 MCG: 100 INJECTION, SOLUTION, CONCENTRATE INTRAVENOUS at 07:09

## 2024-09-26 RX ADMIN — PHENYLEPHRINE HYDROCHLORIDE 100 MCG: 10 INJECTION INTRAVENOUS at 08:09

## 2024-09-26 RX ADMIN — DEXMEDETOMIDINE 8 MCG: 100 INJECTION, SOLUTION, CONCENTRATE INTRAVENOUS at 09:09

## 2024-09-26 RX ADMIN — CALCIUM CHLORIDE 0.5 G: 100 INJECTION, SOLUTION INTRAVENOUS at 08:09

## 2024-09-26 RX ADMIN — MIDAZOLAM HYDROCHLORIDE 2 MG: 2 INJECTION, SOLUTION INTRAMUSCULAR; INTRAVENOUS at 01:09

## 2024-09-26 RX ADMIN — EPHEDRINE SULFATE 10 MG: 50 INJECTION INTRAVENOUS at 09:09

## 2024-09-26 RX ADMIN — ROCURONIUM BROMIDE 30 MG: 10 INJECTION, SOLUTION INTRAVENOUS at 02:09

## 2024-09-26 RX ADMIN — SODIUM CHLORIDE: 0.9 INJECTION, SOLUTION INTRAVENOUS at 01:09

## 2024-09-26 RX ADMIN — ACETAMINOPHEN 1000 MG: 325 TABLET ORAL at 11:09

## 2024-09-26 RX ADMIN — DEXAMETHASONE SODIUM PHOSPHATE 8 MG: 4 INJECTION, SOLUTION INTRAMUSCULAR; INTRAVENOUS at 02:09

## 2024-09-26 RX ADMIN — PROPOFOL 200 MG: 10 INJECTION, EMULSION INTRAVENOUS at 02:09

## 2024-09-26 RX ADMIN — ROCURONIUM BROMIDE 20 MG: 10 INJECTION, SOLUTION INTRAVENOUS at 03:09

## 2024-09-26 RX ADMIN — ROCURONIUM BROMIDE 50 MG: 10 INJECTION, SOLUTION INTRAVENOUS at 02:09

## 2024-09-26 RX ADMIN — FENTANYL CITRATE 50 MCG: 50 INJECTION, SOLUTION INTRAMUSCULAR; INTRAVENOUS at 08:09

## 2024-09-26 RX ADMIN — ONDANSETRON 4 MG: 2 INJECTION INTRAMUSCULAR; INTRAVENOUS at 09:09

## 2024-09-26 RX ADMIN — ACETAMINOPHEN 1000 MG: 325 TABLET ORAL at 05:09

## 2024-09-26 RX ADMIN — PHENYLEPHRINE HYDROCHLORIDE 100 MCG: 10 INJECTION INTRAVENOUS at 09:09

## 2024-09-26 RX ADMIN — HEPARIN SODIUM 3000 UNITS: 1000 INJECTION, SOLUTION INTRAVENOUS; SUBCUTANEOUS at 07:09

## 2024-09-26 RX ADMIN — ROCURONIUM BROMIDE 30 MG: 10 INJECTION, SOLUTION INTRAVENOUS at 04:09

## 2024-09-26 RX ADMIN — Medication 30 MG: at 02:09

## 2024-09-26 RX ADMIN — AMLODIPINE BESYLATE 10 MG: 10 TABLET ORAL at 08:09

## 2024-09-26 RX ADMIN — CHOLECALCIFEROL TAB 25 MCG (1000 UNIT) 1000 UNITS: 25 TAB at 08:09

## 2024-09-26 NOTE — ASSESSMENT & PLAN NOTE
Present on admit. Patient noted to have high grade stenosis on CTA of right leg. Vascular surgery consulted and patient taken to OR and had unilateral angiogram of right leg and had PT/AT artery stenosis on 9/17 and underwent PTA of right PT/AT arteries. Appreciate Vascular surgery assistance on case.   Concern with wound breakdown in last week and if free flap not successful on 9/26 then only other option may be bka

## 2024-09-26 NOTE — ANESTHESIA PROCEDURE NOTES
Intubation    Date/Time: 9/26/2024 2:00 PM    Performed by: Lombard, Jeffrey C., CRNA  Authorized by: Sari Cheney MD    Intubation:     Induction:  Inhalational - mask    Intubated:  Postinduction    Mask Ventilation:  Easy with oral airway    Attempts:  1    Attempted By:  ANGELITO    Blade:  Bravo 3    Laryngeal View Grade: Grade I - full view of cords      Difficult Airway Encountered?: No      Complications:  None    Airway Device:  Direct    Airway Device Size:  7.5    Style/Cuff Inflation:  Cuffed (inflated to minimal occlusive pressure)    Tube secured:  23    Secured at:  The lips    Placement Verified By:  Auscultation    Complicating Factors:  None    Findings Post-Intubation:  Bilateral breath sounds, positive ETCO2 and atraumatic / condition of teeth unchanged

## 2024-09-26 NOTE — PT/OT/SLP PROGRESS
Physical Therapy      Patient Name:  Cortes Schroeder   MRN:  [id number]    PT attempted to see patient in AM, but he declined due to being nervous while waiting for surgery. Patient went to surgery in PM. PT to sign off. Please re-consult when appropriate and include weight bearing status.

## 2024-09-26 NOTE — ASSESSMENT & PLAN NOTE
BG goal 140-180    - Lantus (Insulin Glargine) 17 units daily (15% decrease due to fasting blood glucose slightly below goal)  - [facility] SSI (150/25)  - BG checks q4hr while NPO   - Hypoglycemia protocol in place    ** Please notify Endocrine for any change and/or advance in diet**  ** Please call Endocrine for any BG related issues **    Discharge Planning:   TBD. Please notify endocrinology prior to discharge.

## 2024-09-26 NOTE — NURSING
Patient AAOx4, VSS. NADN. Bed in lowest position, call light in reach along with personal items. Plan of care ongoing.

## 2024-09-26 NOTE — PROGRESS NOTES
"[facility]  Endocrinology  Progress Note    Admit Date: 9/6/2024     Reason for Consult: Management of T2DM, Hyperglycemia      Surgical Procedure and Date: S/P irrigation debridement of RLE on 09/06/2024    Diabetes diagnosis year: 1995     Home Diabetes Medications:    Humalog via OmniPod insulin pump  Mounjaro 10 mg weekly     Current pump settings:  Basal 12 am to 2.3 and 6 am to 1.55  ICR to 3 at 12 am, 2 at 4 pm  ISF to 1:20   AIT 3 hrs  Target 110-120        Patient had anaphylaxis reaction to SGLT2 inhibitors specifically Invokana     How often checking glucose at home? Dexcom G6   BG readings on regimen: Average 210's per Dexcom  Hypoglycemia on the regimen?  Yes  Missed doses on regimen?  No     Diabetes Complications include:     Hyperglycemia and Diabetic peripheral neuropathy         Complicating diabetes co morbidities:   HLD, HTN, Obesity         HPI: 64 y.o. male presents to the ED w/ complaint of altered mental status. Hx of R ankle fracture with surgery over a month ago. Patient now presents with sepsis 2/2 R ankle infection s/p ORIF on 07/26. Started on IV vanc and cefepime. Given IVFs. Blood cultures pending. Brought to OR with ortho on 09/06 for irrigation debridement of RLE. Endocrine following for BG and type 2 diabetes.     Lab Results   Component Value Date    LABA1C 10.8 (H) 08/15/2016    HGBA1C 8.5 (H) 09/06/2024         Interval HPI:   No acute events overnight. Patient in room 542/542 A. Blood glucose stable. BG at and below goal on current insulin regimen (SSI, prandial, and basal insulin ). Steroid use- None.      Renal function- normal      Vasopressors-  None      NPO      Eating:   NPO   Nausea: No  Hypoglycemia and intervention: No  Fever: No  TPN and/or TF: No    BP (!) 154/70 (BP Location: Right arm, Patient Position: Lying)   Pulse 101   Temp 98.1 °F (36.7 °C) (Oral)   Resp 18   Ht 5' 5" (1.651 m)   Wt 93 kg (205 lb 0.4 oz)   SpO2 98%   BMI 34.12 kg/m²     Labs " "Reviewed and Include    Recent Labs   Lab 09/26/24  0426      CALCIUM 7.8*   ALBUMIN 1.6*   PROT 6.0      K 4.1   CO2 20*      BUN 18   CREATININE 1.1   ALKPHOS 168*   ALT 6*   AST 16   BILITOT 0.3     Lab Results   Component Value Date    WBC 10.16 09/26/2024    HGB 7.8 (L) 09/26/2024    HCT 24.5 (L) 09/26/2024    MCV 88 09/26/2024     (H) 09/26/2024     No results for input(s): "TSH", "FREET4" in the last 168 hours.  Lab Results   Component Value Date    HGBA1C 8.5 (H) 09/06/2024       Nutritional status:   Body mass index is 34.12 kg/m².  Lab Results   Component Value Date    ALBUMIN 1.6 (L) 09/26/2024    ALBUMIN 1.6 (L) 09/25/2024    ALBUMIN 1.7 (L) 09/24/2024     Lab Results   Component Value Date    PREALBUMIN 3 (L) 09/06/2024    PREALBUMIN 13 (L) 07/18/2024       Estimated Creatinine Clearance: 72 mL/min (based on SCr of 1.1 mg/dL).    Accu-Checks  Recent Labs     09/24/24  0826 09/24/24  0949 09/24/24  1141 09/24/24  1554 09/24/24  1930 09/25/24  0733 09/25/24  1123 09/25/24  1542 09/25/24  1948 09/26/24  0755   POCTGLUCOSE 77 82 88 130* 299* 260* 221* 107 85 152*       Current Medications and/or Treatments Impacting Glycemic Control  Immunotherapy:    Immunosuppressants       None          Steroids:   Hormones (From admission, onward)      None          Pressors:    Autonomic Drugs (From admission, onward)      None          Hyperglycemia/Diabetes Medications:   Antihyperglycemics (From admission, onward)      Start     Stop Route Frequency Ordered    09/26/24 0956  insulin aspart U-100 pen 0-10 Units         -- SubQ Every 4 hours PRN 09/26/24 0856    09/26/24 0900  insulin glargine U-100 (Lantus) pen 17 Units         -- SubQ Daily 09/26/24 0720            ASSESSMENT and PLAN    Cardiac/Vascular  Hyperlipidemia  On statin per ADA guidelines       Endocrine  Uncontrolled type 2 diabetes mellitus with hyperglycemia  BG goal 140-180    - Lantus (Insulin Glargine) 17 units daily (15% " decrease due to fasting blood glucose slightly below goal)  - [facility] SSI (150/25)  - BG checks q4hr while NPO   - Hypoglycemia protocol in place    ** Please notify Endocrine for any change and/or advance in diet**  ** Please call Endocrine for any BG related issues **    Discharge Planning:   TBD. Please notify endocrinology prior to discharge.        Orthopedic  * Surgical wound breakdown  Optimize BG control to improve wound healing  Managed per primary team              Payton Vora PA-C  Endocrinology  [facility]

## 2024-09-26 NOTE — PROGRESS NOTES
[facility] Medicine  Progress Note    Patient Name: Cortes Schroeder  MRN: [id number]  Patient Class: IP- Inpatient   Admission Date: 9/6/2024  Length of Stay: 20 days  Attending Physician: Maria Del Rosario Medina MD  Primary Care Provider: Andrew Sinclair Jr., MD        Subjective:     Principal Problem:Surgical wound breakdown        HPI:  Cortes Schroeder is a 64 y.o. M with PMHx of anxiety, T2DM, GERD, HLD, HTN who presented to ED for AMS. He had a fall in July that resulted in R trimalleolar fracture and L distal radius fracture. He had external fixation on 07/18 and then ORIF on 07/26 with Dr. Liz. He was prescribed clinda 300 mg on 08/28 for a ten day course. Patient was doing well when he acutely became altered and not acting like himself a few hours ago. Patient family member drove him here from [facility] for ortho consult. R medial ankle wound dehisced with redness and swelling around it. No CPM fever, cough, N/V/D or seizure.    In ED: T max 99.1. SIRS 2/4 with WBC 18 and . CMP notable for Na 127, Cr 1.7, . Phos 1.9. .3. BNP 73. Trop neg. A1c 8.5. R tib fib XR notes There are 2 abandoned anterior-posteriorly oriented pin tracks in the right mid tibial shaft.  On AP views, each of these pin tracks demonstrates a small faint internal density, possibly representing a sequestrum. Ortho and endocrine consulted. Given IV vanc and IV clindamycin. Given 2.7L IVFs. Admitted to hospital medicine for sepsis 2/2 infected hardware.     Overview/Hospital Course:  Cortes Schroeder is a 63 y/o male admitted to hospital medicine for sepsis related to right ankle from surgical wound infection in patient with recent ORIF of right ankle fracture done on 7/26/2024. Patient started on empiric IV Vancomycin and IV Cefepime and given IVF's as per sepsis protocol. Orthopedics consulted and patient taken to OR on 9/6/2024 and had irrigation debridement of right surgical incision and  placement of wound vac.Endocrine consulted to assist in management of his diabetes while in hospital. Blood cultures from admit returned with MSSA. Infectious Disease consulted. Wound culture returned positive for Group B streptococcus and MSSA. Echo done due to bacteremia without concern for vegetations. ID changed antibiotics to IV Oxacillin. Patient with new cough and worsening WBC. CTA chest negative for PE but notes small area of ground glass changes to RUL. Patient placed on 5 day course of po Doxycycline to treat as per ID and completed for possible pneumonia. Patient taken back to OR on 09/09/2024 with Ortho and had another irrigation and debridement and replacement of wound vac to right ankle. Plastics consulted for flap evaluation and recommended vascular evaluation. CTA right lower extremity done and showed moderate atherosclerosis and multifocal high grade stenosis throughout right calf arteries. Dietary consulted for malnutrition and started on Boost supplementation to maximize his nutrition for a flap and wound healing. Vascular surgery consulted per Plastics recs as they would like to pre-optimize blood flow prior to any free flap or pedicled propellar flap. Vascular recommended angiogram of right leg but patient developed DEEPALI. Nephrology consulted and suspected ATN related to contrast nephropathy. Patient placed on supportive care. DEEPALI resolved. Patyoanetn taken for unilateral angiogram by Vascular on 9/17 and underwent PTA of right posterior tibial artery and right anterior tibial artery. After revascularization of right leg pl;an to do flap but working on timing with Ortho and Plastics. Patient to go back to OR again on 9/20 for another irrigation and debridement and replacement of wound vac to right ankle wound by Ortho. Plastics plans propeller flap to right ankle on 9/25 and then will need to remain in hospital for 1 week after flap for dangling protocol and monitoring of flap per Plastics. Repeat  blood cultures from 9/8 and 9/10 no growth. Patient taken back to OR on 9/20 with Dr. Moe Liz and had another I&D of right ankle and wound and replacement of wound vac. Patient to remain non weight bearing post-op and Plastics plans flap placement while in hospital on 9/25. Patient to go back to OR on for another I&D and wound vac change on 9/23. Patient taken back to OR on 9/23 with Dr. Liz and had another I&D and wound vac change with Orthopedics. Ortho noted anterolateral foot eschar developing. Distal lateral wound breakdown and ortho opened up majority of lateral wound and excised surrounding skin and subcutaneous tissue. Ortho removed all ankle hardware. Wound vacs placed medially and laterally. Patient re-evaluated by Plastics on 9/25 and state due to lateral wound in addition to anterior wound no longer a candidate for propellar flap and plastic will need to do free flap to cover both wounds. Plastics concerned if no adequate vascular targets, will be unable to cover and BKA is only option. Patient not very happy. Patient to go to OR On 9/26 for free flap.     Interval History: seen this morning as plan for OR with plastics today to try to create free flap and had hardware removed with ortho earlier this week. He has some anxiety as to be expected given that this is likely last option to try to salvage right leg and if fails then BKA is likely only other option. Discussed with him at length as he reports he is unsure how it got to this/how it happened, and discussed that given PVD was present before admit contributed to poor wound healing which led to this which eventually resulted in infection/bacteremia with systemic complications and bacteremia on admit which caused sepsis and complications/mary/encephalpathy/etcand eventual diagnosis of the PVD by angiogram once was medically safe and stable to do so but wound breakdown worsened in last week while I was off service and now concerns that only  have this option left to cover wound as wound healing options low without this option and high risk of worsening/sepsis/gangrene which would be reasons why would need the other option for long term if was not successful and he understood. He reports he is sick of being in [facility] and in limbo with the ankle and that is wrapping his head around this as a potential outcome and is making peace with it if things end up going that way. His titus bowen has a LE amputation after doing antibitoics/PICC and so he has exposure closely to the process as well. He is taking his mind off this right now by thinking about trips he wants to take long term, he wants to go to [place], [facility] again, and [place] and [place] once he is well enough long term. He is trying to relax now as we talked about it is what it is right now and we will know more today once surgery is done what the ultimate plan will be and its out of our hands right now and he seemd to relax more after talking today and was in good spirits overall. If gets free flap done, may need icu for q1 flap checks, will f/u today      Review of Systems   Constitutional:  Negative for fever.   Respiratory:  Negative for shortness of breath.    Cardiovascular:  Negative for chest pain.   Gastrointestinal:  Negative for nausea and vomiting.   Musculoskeletal:  Positive for arthralgias (Right ankle).   Psychiatric/Behavioral:  Negative for confusion.      Objective:     Vital Signs (Most Recent):  Temp: 97.5 °F (36.4 °C) (09/25/24 1540)  Pulse: 79 (09/25/24 1540)  Resp: 19 (09/25/24 1540)  BP: 158/76 (09/25/24 1540)  SpO2: 96 % (09/25/24 1540) on room air Vital Signs (24h Range):  Temp:  [97.5 °F (36.4 °C)-98.3 °F (36.8 °C)] 98.1 °F (36.7 °C)  Pulse:  [] 101  Resp:  [18-19] 18  SpO2:  [96 %-98 %] 98 %  BP: (139-165)/(62-79) 154/70     Weight: 93 kg (205 lb 0.4 oz)  Body mass index is 34.12 kg/m².    Intake/Output Summary (Last 24 hours) at 9/26/2024 0978  Last  data filed at 9/26/2024 0500  Gross per 24 hour   Intake 787 ml   Output 1700 ml   Net -913 ml         Physical Exam  Vitals and nursing note reviewed.   Constitutional:       General: He is awake. He is not in acute distress.     Appearance: Normal appearance. He is well-developed. He is obese.      Comments: Patient sitting up in bed in no distress and in no apparent pain. Patient in good spirits.    Eyes:      Conjunctiva/sclera: Conjunctivae normal.   Cardiovascular:      Rate and Rhythm: Normal rate and regular rhythm.      Heart sounds: Normal heart sounds. No murmur heard.  Pulmonary:      Effort: Pulmonary effort is normal. No respiratory distress.      Breath sounds: Normal breath sounds. No wheezing.   Abdominal:      General: Abdomen is flat. Bowel sounds are normal. There is no distension.      Palpations: Abdomen is soft.      Tenderness: There is no abdominal tenderness.   Musculoskeletal:      Left lower leg: No edema.      Comments: Wound vac in place to right ankle and ACE bandage overlying wound vac.    Skin:     General: Skin is warm.      Findings: No erythema.   Neurological:      Mental Status: He is alert and oriented to person, place, and time.   Psychiatric:         Mood and Affect: Mood normal.         Behavior: Behavior normal. Behavior is cooperative.         Thought Content: Thought content normal.         Judgment: Judgment normal.             Significant Labs: CBC:   Recent Labs   Lab 09/25/24  0233 09/26/24  0426   WBC 9.44 10.16   HGB 7.6* 7.8*   HCT 25.0* 24.5*    452*     CMP:   Recent Labs   Lab 09/25/24  0233 09/26/24  0426    140   K 4.2 4.1    110   CO2 21* 20*   * 108   BUN 19 18   CREATININE 1.1 1.1   CALCIUM 7.8* 7.8*   PROT 5.9* 6.0   ALBUMIN 1.6* 1.6*   BILITOT 0.2 0.3   ALKPHOS 174* 168*   AST 17 16   ALT 5* 6*   ANIONGAP 7* 10     Blood Sugars (AccuCheck):    Recent Labs     09/24/24  1930 09/25/24  0733 09/25/24  1123 09/25/24  1542  09/25/24 1948 09/26/24  0755   POCTGLUCOSE 299* 260* 221* 107 85 152*       Significant Imaging: I have reviewed all pertinent imaging results/findings within the past 24 hours.    Assessment/Plan:      * Surgical wound breakdown  Closed trimalleolar fracture of right ankle s/p ORIF in 7/2024  Surgical site infection  63 yo male presenting with confusion and worsening redness, swelling and pain to right ankle. Patient with sepsis criteria on admit and right ankle wound felt to be source of infection.   - Ortho consulted and patient taken to OR 9/06/2024 for debridement of surgical wound with wound vac placed. Cultures taken and grew both MSSA and Group B streptococcus from wound. Patient taken back again to OR with Ortho with Dr. Liz and patient had another I&Dand wound vac change on 9/9/2024. Orthopedics took back to OR again on 9/20/2024 with Dr. Liz and underwent another I&D of right ankle and wound and replacement of wound vac. Patient taken back to OR with OR again on 9/23 with Dr. Liz and had another I&D and wound vac change with Orthopedics. Ortho noted anterolateral foot eschar developing. Distal lateral wound breakdown and ortho opened up majority of lateral wound and excised surrounding skin and subcutaneous tissue. Ortho removed all ankle hardware. Wound vacs placed medially and laterally.   - Orthopedics recommended Plastics evaluation for free flap and as part of evaluation recommended Vascular evaluation. Vascular evaluated patient and angiogram of right leg done and PTA done and revascularization of right PT/AT. Plastics plan propeller flap on 9/25/2024 but unfortunately due to presence of lateral and anterior wounds to right ankle no longer candidate for propeller flap so now will need free flap to cover both wounds. Plastics plans to take to OR on 9/26 for free flap to right ankle wounds. Plastics reports if unable to do free flap then patient's only option will be a BKA.  He  understands and will f/u further 9/26 OR recs  - Wound cultures -- MSSA and Group B strep from right ankle.   - Blood cultures -- MSSA on admit but repeat blood cultures negative.   - ID consulted and following and appreciate recs:  - Continue IV Oxacillin 12 g every 24 hours continuous infusion. Plan for orals if hardware retained once completed.  - Anticipate 6 weeks of IV antibiotics from date of most recent washout.  - Continue PT/OT and non weight bearing to right lower extremity as per Ortho. Continue wound vac as per Ortho to surgical site.  - Pain controlled and continue multimodals.   - Continue Lovenox 40 mg subcutaneous daily for DVT prophylaxis.     Airway malacia  Noted on CT scan of chest. Patient with no acute issues and incidentally noted on CT scan of chest. Patient asymptomatic.       PAD (peripheral artery disease)  Present on admit. Patient noted to have high grade stenosis on CTA of right leg. Vascular surgery consulted and patient taken to OR and had unilateral angiogram of right leg and had PT/AT artery stenosis on 9/17 and underwent PTA of right PT/AT arteries. Appreciate Vascular surgery assistance on case.   Concern with wound breakdown in last week and if free flap not successful on 9/26 then only other option may be bka      Acute blood loss anemia  - Hgb fluctuating and own to 7.6 on 9/25 from 8.3 on 9/24. No clinical signs of bleeding and will monitor.   - Anemia is likely due to acute blood loss which was from surgery. Most recent hemoglobin and hematocrit are listed below.  Recent Labs     09/23/24  0314 09/24/24  0257 09/25/24  0233   HGB 8.4* 8.3* 7.6*   HCT 26.6* 25.5* 25.0*       Plan  - Monitor serial CBC: Daily  - Transfuse PRBC if patient becomes hemodynamically unstable, symptomatic or H/H drops below 7/21.  - Patient has not received any PRBC transfusions to date  - Patient's anemia is currently stable and monitor. No indication for blood transfusion at this time.      Thrombocytosis  Improving with treatment of infection. Present on admit and related to infection causing increase in platelet count. Monitor daily CBC.       On pre-exposure prophylaxis for HIV  Patient on Truvada as outpatient but held in hospital due to elevated AST and ALT that has resolved. Plan to resume Truvada on discharge.     MSSA bacteremia  Bacteremia resolved.   - Blood cultures from admit grew MSSA. Repeat blood cultures done on 9/8 and 9/10 no growth.  - Infectious Disease consulted and patient placed on IV Oxacillin infusion as suspected source was right ankle surgical wound infection for bacteremia as grew MSSA from right ankle wound.   - Echo done without concern for vegetations.    Uncontrolled type 2 diabetes mellitus with hyperglycemia  Patient's FSGs controlled. Endocrine consulted and managing patient's diabetes in hospital and appreciate assistance. Patient on insulin pump at [facility] but not in hospital and on basal/prandial insulin in hospital as per Endocrine. Appreciate Endocrine assistance on this case.   Last A1c reviewed-   Lab Results   Component Value Date    LABA1C 10.8 (H) 08/15/2016    HGBA1C 8.5 (H) 09/06/2024     Most recent fingerstick glucose reviewed-   Recent Labs   Lab 09/24/24  1930 09/25/24  0733 09/25/24  1123 09/25/24  1542   POCTGLUCOSE 299* 260* 221* 107       Current correctional scale  Low  Maintain anti-hyperglycemic dose as follows-   Antihyperglycemics (From admission, onward)      Start     Stop Route Frequency Ordered    09/25/24 1000  insulin aspart U-100 pen 0-10 Units         -- SubQ Before meals & nightly PRN 09/25/24 0901    09/25/24 0900  insulin glargine U-100 (Lantus) pen 20 Units         -- SubQ Daily 09/25/24 0844    09/20/24 1130  insulin aspart U-100 pen 2-8 Units         -- SubQ 3 times daily with meals 09/20/24 0844           - Endocrine consulted:  - At this time, recommend that the patient remain off of his pump since he cannot be controlled in  "the Auto mode. Patient does not interact with pump frequently and relies on the auto mode algorithm.   - Insulin dosing as per Endocrine recommendations.   - Hold oral antihyperglycemics during hospitalization   - Monitor blood sugars with meals and at bedtime in hospital.  - Target blood sugars 140-180 in hospital.  - Diabetic diet.     Closed trimalleolar fracture of right ankle  S/p ORIF 07/2024  Ortho consulted   - see surgical wound breakdown above  NWB    Class 1 obesity due to excess calories with serious comorbidity and body mass index (BMI) of 34.0 to 34.9 in adult  Body mass index is 34.12 kg/m². Morbid obesity complicates all aspects of disease management from diagnostic modalities to treatment. Weight loss encouraged and health benefits explained to patient.         Gastroesophageal reflux disease without esophagitis  Controlled. Continue Carafate every 6 hours po to treat.       Uncontrolled type 2 diabetes mellitus with diabetic polyneuropathy, with long-term current use of insulin  Patient's FSGs are uncontrolled due to hyperglycemia on current medication regimen.  Last A1c reviewed-   Lab Results   Component Value Date    LABA1C 10.8 (H) 08/15/2016    HGBA1C 9.9 (H) 07/18/2024     Most recent fingerstick glucose reviewed- No results for input(s): "POCTGLUCOSE" in the last 24 hours.  Current correctional scale  Low  Maintain anti-hyperglycemic dose as follows-   Antihyperglycemics (From admission, onward)      Start     Stop Route Frequency Ordered    09/06/24 0506  insulin aspart U-100 pen 0-5 Units         -- SubQ Every 6 hours PRN 09/06/24 0406          Hold Oral hypoglycemics while patient is in the hospital.      VTE Risk Mitigation (From admission, onward)           Ordered     enoxaparin injection 40 mg  Daily         09/18/24 0808     IP VTE HIGH RISK PATIENT  Once         09/06/24 1735                    Discharge Planning   JAYLEEN: 10/3/2024     Code Status: Full Code   Is the patient medically " ready for discharge?:     Reason for patient still in hospital (select all that apply): Patient trending condition  Discharge Plan A: Home Health                  Maria Del Rosario Medina MD  Department of Hospital Medicine   [facility]

## 2024-09-26 NOTE — PROGRESS NOTES
.Plastic and Reconstructive Surgery   Progress Note    Subjective:    NAOE. Afebrile, HDS. Pain well controlled. Plan for OR today for free flap coverage of RLE wound.     Objective:  Vital signs in last 24 hours:  Temp:  [97.5 °F (36.4 °C)-98.3 °F (36.8 °C)] 98.3 °F (36.8 °C)  Pulse:  [79-98] 98  Resp:  [16-19] 18  SpO2:  [95 %-97 %] 96 %  BP: (139-165)/(62-86) 159/76    Intake/Output last 3 shifts:  I/O last 3 completed shifts:  In: 1307 [P.O.:1307]  Out: 2860 [Urine:1950; Other:910]    Intake/Output this shift:  I/O this shift:  In: 200 [P.O.:200]  Out: 650 [Urine:600; Other:50]        Physical Exam:  VITAL SIGNS:   Vitals:    24 1948 24 2258 24 0328 24 0504   BP: 139/62   (!) 159/76   BP Location:       Patient Position: Lying   Lying   Pulse: 87 79 98 98   Resp: 18   18   Temp: 97.9 °F (36.6 °C)   98.3 °F (36.8 °C)   TempSrc: Oral   Oral   SpO2: 96%   96%   Weight:       Height:         TMAX: Temp (24hrs), Av.1 °F (36.7 °C), Min:97.5 °F (36.4 °C), Max:98.3 °F (36.8 °C)      General: Alert; No acute distress  Cardiovascular: Regular rate   Respiratory: Normal respiratory effort. Chest rise symmetric.   Abdomen: Soft, nontender, nondistended  Extremity:  RLE vac in place with likely some revasc erythema, unable to appreciate palpable PT/DP, +++doppler PT Signal, + DP signal.    Neurologic: No focal deficit. Speech normal      Scheduled Medications acetaminophen, 1,000 mg, Q8H  amLODIPine, 10 mg, Daily  enoxaparin, 40 mg, Daily  insulin aspart U-100, 2-8 Units, TIDWM  insulin glargine U-100, 20 Units, Daily  losartan, 50 mg, Daily  nortriptyline, 50 mg, Nightly  oxacillin 12 g in  mL CONTINUOUS INFUSION, 12 g, Q24H  pantoprazole, 40 mg, Daily  polyethylene glycol, 17 g, BID  senna, 8.6 mg, BID  sucralfate, 1 g, Q6H  vitamin D, 1,000 Units, Daily        PRN Medications     Current Facility-Administered Medications:     0.9%  NaCl infusion (for blood administration), , Intravenous,  Q24H PRN    albuterol-ipratropium, 3 mL, Nebulization, Q4H PRN    calcium carbonate, 1,000 mg, Oral, TID PRN    dextrose 10%, 12.5 g, Intravenous, PRN    dextrose 10%, 12.5 g, Intravenous, PRN    dextrose 10%, 12.5 g, Intravenous, PRN    dextrose 10%, 25 g, Intravenous, PRN    dextrose 10%, 25 g, Intravenous, PRN    dextrose 10%, 25 g, Intravenous, PRN    fentaNYL, 25 mcg, Intravenous, Q5 Min PRN    glucagon (human recombinant), 1 mg, Intramuscular, PRN    glucagon (human recombinant), 1 mg, Intramuscular, PRN    glucose, 16 g, Oral, PRN    glucose, 24 g, Oral, PRN    hydrALAZINE, 25 mg, Oral, Q8H PRN    insulin aspart U-100, 0-10 Units, Subcutaneous, QID (AC + HS) PRN    methocarbamoL, 500 mg, Oral, TID PRN    morphine, 2 mg, Intravenous, Q6H PRN    morphine, 4 mg, Intravenous, Q6H PRN    ondansetron, 8 mg, Oral, Q8H PRN    prochlorperazine, 5 mg, Intravenous, Q30 Min PRN    Recent Labs:   Lab Results   Component Value Date    WBC 10.16 09/26/2024    HGB 7.8 (L) 09/26/2024    HCT 24.5 (L) 09/26/2024    MCV 88 09/26/2024     (H) 09/26/2024     Lab Results   Component Value Date     09/26/2024     09/26/2024    K 4.1 09/26/2024     09/26/2024    BUN 18 09/26/2024         Assessment:   64 y.o. y/o male s/p Procedure(s):  REPLACEMENT, WOUND VAC; white & black sponge  REMOVAL, HARDWARE, ANKLE      3 Days Post-Op     64 y.o.male W/ right ankle post-surgical medial wound dehiscence and exposed hardware.      Now s/p PTA RLE PT/AT, completion angiogram showing 3 vessel run off, also washout and vac exchange     Plan  - Plan for OR for flap coverage today.   - Given lateral wound breakdown, no longer a candidate for propellar flap. Will need to perform free flap to cover both wounds. If no adequate vascular targets, will be unable to cover and BKA is only option. Reiterated this with the patient this morning.   - revascularization performed last week. Strong PT however arch is not well  revascularized on doppler signals  - Drains: vac in place  - Diet: NPOpm, IVF preoperatively  - Abx: per ID  - DVT ppx, IS, OOB, ambulate, PT/OT      Patient was discussed with Attending Plastic Surgeon, Dr. Rom Paniagua MD   General Surgery PGY-1  9/26/2024

## 2024-09-26 NOTE — ASSESSMENT & PLAN NOTE
Closed trimalleolar fracture of right ankle s/p ORIF in 7/2024  Surgical site infection  65 yo male presenting with confusion and worsening redness, swelling and pain to right ankle. Patient with sepsis criteria on admit and right ankle wound felt to be source of infection.   - Ortho consulted and patient taken to OR 9/06/2024 for debridement of surgical wound with wound vac placed. Cultures taken and grew both MSSA and Group B streptococcus from wound. Patient taken back again to OR with Ortho with Dr. Liz and patient had another I&Dand wound vac change on 9/9/2024. Orthopedics took back to OR again on 9/20/2024 with Dr. Liz and underwent another I&D of right ankle and wound and replacement of wound vac. Patient taken back to OR with OR again on 9/23 with Dr. Liz and had another I&D and wound vac change with Orthopedics. Ortho noted anterolateral foot eschar developing. Distal lateral wound breakdown and ortho opened up majority of lateral wound and excised surrounding skin and subcutaneous tissue. Ortho removed all ankle hardware. Wound vacs placed medially and laterally.   - Orthopedics recommended Plastics evaluation for free flap and as part of evaluation recommended Vascular evaluation. Vascular evaluated patient and angiogram of right leg done and PTA done and revascularization of right PT/AT. Plastics plan propeller flap on 9/25/2024 but unfortunately due to presence of lateral and anterior wounds to right ankle no longer candidate for propeller flap so now will need free flap to cover both wounds. Plastics plans to take to OR on 9/26 for free flap to right ankle wounds. Plastics reports if unable to do free flap then patient's only option will be a BKA.  He understands and will f/u further 9/26 OR recs  - Wound cultures -- MSSA and Group B strep from right ankle.   - Blood cultures -- MSSA on admit but repeat blood cultures negative.   - ID consulted and following and appreciate recs:  -  Continue IV Oxacillin 12 g every 24 hours continuous infusion. Plan for orals if hardware retained once completed.  - Anticipate 6 weeks of IV antibiotics from date of most recent washout.  - Continue PT/OT and non weight bearing to right lower extremity as per Ortho. Continue wound vac as per Ortho to surgical site.  - Pain controlled and continue multimodals.   - Continue Lovenox 40 mg subcutaneous daily for DVT prophylaxis.

## 2024-09-26 NOTE — SUBJECTIVE & OBJECTIVE
"Interval HPI:   No acute events overnight. Patient in room 542/542 A. Blood glucose stable. BG at and below goal on current insulin regimen (SSI, prandial, and basal insulin ). Steroid use- None.      Renal function- normal      Vasopressors-  None      NPO      Eating:   NPO   Nausea: No  Hypoglycemia and intervention: No  Fever: No  TPN and/or TF: No    BP (!) 154/70 (BP Location: Right arm, Patient Position: Lying)   Pulse 101   Temp 98.1 °F (36.7 °C) (Oral)   Resp 18   Ht 5' 5" (1.651 m)   Wt 93 kg (205 lb 0.4 oz)   SpO2 98%   BMI 34.12 kg/m²     Labs Reviewed and Include    Recent Labs   Lab 09/26/24  0426      CALCIUM 7.8*   ALBUMIN 1.6*   PROT 6.0      K 4.1   CO2 20*      BUN 18   CREATININE 1.1   ALKPHOS 168*   ALT 6*   AST 16   BILITOT 0.3     Lab Results   Component Value Date    WBC 10.16 09/26/2024    HGB 7.8 (L) 09/26/2024    HCT 24.5 (L) 09/26/2024    MCV 88 09/26/2024     (H) 09/26/2024     No results for input(s): "TSH", "FREET4" in the last 168 hours.  Lab Results   Component Value Date    HGBA1C 8.5 (H) 09/06/2024       Nutritional status:   Body mass index is 34.12 kg/m².  Lab Results   Component Value Date    ALBUMIN 1.6 (L) 09/26/2024    ALBUMIN 1.6 (L) 09/25/2024    ALBUMIN 1.7 (L) 09/24/2024     Lab Results   Component Value Date    PREALBUMIN 3 (L) 09/06/2024    PREALBUMIN 13 (L) 07/18/2024       Estimated Creatinine Clearance: 72 mL/min (based on SCr of 1.1 mg/dL).    Accu-Checks  Recent Labs     09/24/24  0826 09/24/24  0949 09/24/24  1141 09/24/24  1554 09/24/24  1930 09/25/24  0733 09/25/24  1123 09/25/24  1542 09/25/24  1948 09/26/24  0755   POCTGLUCOSE 77 82 88 130* 299* 260* 221* 107 85 152*       Current Medications and/or Treatments Impacting Glycemic Control  Immunotherapy:    Immunosuppressants       None          Steroids:   Hormones (From admission, onward)      None          Pressors:    Autonomic Drugs (From admission, onward)      None      "     Hyperglycemia/Diabetes Medications:   Antihyperglycemics (From admission, onward)      Start     Stop Route Frequency Ordered    09/26/24 0956  insulin aspart U-100 pen 0-10 Units         -- SubQ Every 4 hours PRN 09/26/24 0856    09/26/24 0900  insulin glargine U-100 (Lantus) pen 17 Units         -- SubQ Daily 09/26/24 0720

## 2024-09-26 NOTE — SUBJECTIVE & OBJECTIVE
Interval History: seen this morning as plan for OR with plastics today to try to create free flap and had hardware removed with ortho earlier this week. He has some anxiety as to be expected given that this is likely last option to try to salvage right leg and if fails then BKA is likely only other option. Discussed with him at length as he reports he is unsure how it got to this/how it happened, and discussed that given PVD was present before admit contributed to poor wound healing which led to this which eventually resulted in infection/bacteremia with systemic complications and bacteremia on admit which caused sepsis and complications/mary/encephalpathy/etcand eventual diagnosis of the PVD by angiogram once was medically safe and stable to do so but wound breakdown worsened in last week while I was off service and now concerns that only have this option left to cover wound as wound healing options low without this option and high risk of worsening/sepsis/gangrene which would be reasons why would need the other option for long term if was not successful and he understood. He reports he is [facility] of [facility] and in [place] with the ankle and that is wrapping his head around this as a potential outcome and is making peace with it if things end up going that way. [facility] am[facility] after doing antibitoics/PICC and so he has exposure closely to the process as well. He is taking his mind off this right now by thinking about trips he wants to take long term, he wants to go to [place], [facility] again, and [place] and [place] once he is well enough long term. He is trying to relax now as we talked about it is what it is right now and we will know more today once surgery is done what the ultimate plan will be and its out of our hands right now and he seemd to relax more after talking today and was in good spirits overall. If gets free flap done, may need icu for q1 flap checks, will f/u  today      Review of Systems   Constitutional:  Negative for fever.   Respiratory:  Negative for shortness of breath.    Cardiovascular:  Negative for chest pain.   Gastrointestinal:  Negative for nausea and vomiting.   Musculoskeletal:  Positive for arthralgias (Right ankle).   Psychiatric/Behavioral:  Negative for confusion.      Objective:     Vital Signs (Most Recent):  Temp: 97.5 °F (36.4 °C) (09/25/24 1540)  Pulse: 79 (09/25/24 1540)  Resp: 19 (09/25/24 1540)  BP: 158/76 (09/25/24 1540)  SpO2: 96 % (09/25/24 1540) on room air Vital Signs (24h Range):  Temp:  [97.5 °F (36.4 °C)-98.3 °F (36.8 °C)] 98.1 °F (36.7 °C)  Pulse:  [] 101  Resp:  [18-19] 18  SpO2:  [96 %-98 %] 98 %  BP: (139-165)/(62-79) 154/70     Weight: 93 kg (205 lb 0.4 oz)  Body mass index is 34.12 kg/m².    Intake/Output Summary (Last 24 hours) at 9/26/2024 0945  Last data filed at 9/26/2024 0500  Gross per 24 hour   Intake 787 ml   Output 1700 ml   Net -913 ml         Physical Exam  Vitals and nursing note reviewed.   Constitutional:       General: He is awake. He is not in acute distress.     Appearance: Normal appearance. He is well-developed. He is obese.      Comments: Patient sitting up in bed in no distress and in no apparent pain. Patient in good spirits.    Eyes:      Conjunctiva/sclera: Conjunctivae normal.   Cardiovascular:      Rate and Rhythm: Normal rate and regular rhythm.      Heart sounds: Normal heart sounds. No murmur heard.  Pulmonary:      Effort: Pulmonary effort is normal. No respiratory distress.      Breath sounds: Normal breath sounds. No wheezing.   Abdominal:      General: Abdomen is flat. Bowel sounds are normal. There is no distension.      Palpations: Abdomen is soft.      Tenderness: There is no abdominal tenderness.   Musculoskeletal:      Left lower leg: No edema.      Comments: Wound vac in place to right ankle and ACE bandage overlying wound vac.    Skin:     General: Skin is warm.      Findings: No  erythema.   Neurological:      Mental Status: He is alert and oriented to person, place, and time.   Psychiatric:         Mood and Affect: Mood normal.         Behavior: Behavior normal. Behavior is cooperative.         Thought Content: Thought content normal.         Judgment: Judgment normal.             Significant Labs: CBC:   Recent Labs   Lab 09/25/24 0233 09/26/24 0426   WBC 9.44 10.16   HGB 7.6* 7.8*   HCT 25.0* 24.5*    452*     CMP:   Recent Labs   Lab 09/25/24 0233 09/26/24 0426    140   K 4.2 4.1    110   CO2 21* 20*   * 108   BUN 19 18   CREATININE 1.1 1.1   CALCIUM 7.8* 7.8*   PROT 5.9* 6.0   ALBUMIN 1.6* 1.6*   BILITOT 0.2 0.3   ALKPHOS 174* 168*   AST 17 16   ALT 5* 6*   ANIONGAP 7* 10     Blood Sugars (AccuCheck):    Recent Labs     09/24/24  1930 09/25/24  0733 09/25/24  1123 09/25/24  1542 09/25/24  1948 09/26/24  0755   POCTGLUCOSE 299* 260* 221* 107 85 152*       Significant Imaging: I have reviewed all pertinent imaging results/findings within the past 24 hours.

## 2024-09-26 NOTE — PT/OT/SLP PROGRESS
Occupational Therapy      Patient Name:  Cortes Schroeder   MRN:  [id number]    Patient not seen today secondary to Increased agitation (and waiting for off floor sx). Will follow-up when available.    9/26/2024

## 2024-09-27 LAB
ALBUMIN SERPL BCP-MCNC: 1.8 G/DL (ref 3.5–5.2)
ALP SERPL-CCNC: 133 U/L (ref 55–135)
ALT SERPL W/O P-5'-P-CCNC: 5 U/L (ref 10–44)
ANION GAP SERPL CALC-SCNC: 19 MMOL/L (ref 8–16)
AST SERPL-CCNC: 16 U/L (ref 10–40)
BILIRUB SERPL-MCNC: 0.7 MG/DL (ref 0.1–1)
BUN SERPL-MCNC: 24 MG/DL (ref 8–23)
CALCIUM SERPL-MCNC: 8 MG/DL (ref 8.7–10.5)
CHLORIDE SERPL-SCNC: 109 MMOL/L (ref 95–110)
CO2 SERPL-SCNC: 11 MMOL/L (ref 23–29)
CREAT SERPL-MCNC: 1.3 MG/DL (ref 0.5–1.4)
ERYTHROCYTE [DISTWIDTH] IN BLOOD BY AUTOMATED COUNT: 17.3 % (ref 11.5–14.5)
EST. GFR  (NO RACE VARIABLE): >60 ML/MIN/1.73 M^2
GLUCOSE SERPL-MCNC: 126 MG/DL (ref 70–110)
GLUCOSE SERPL-MCNC: 137 MG/DL (ref 70–110)
GLUCOSE SERPL-MCNC: 138 MG/DL (ref 70–110)
GLUCOSE SERPL-MCNC: 141 MG/DL (ref 70–110)
GLUCOSE SERPL-MCNC: 157 MG/DL (ref 70–110)
GLUCOSE SERPL-MCNC: 163 MG/DL (ref 70–110)
GLUCOSE SERPL-MCNC: 272 MG/DL (ref 70–110)
HCO3 UR-SCNC: 19.1 MMOL/L (ref 24–28)
HCO3 UR-SCNC: 20 MMOL/L (ref 24–28)
HCO3 UR-SCNC: 20.5 MMOL/L (ref 24–28)
HCO3 UR-SCNC: 21.4 MMOL/L (ref 24–28)
HCO3 UR-SCNC: 22.2 MMOL/L (ref 24–28)
HCO3 UR-SCNC: 22.6 MMOL/L (ref 24–28)
HCT VFR BLD AUTO: 25.5 % (ref 40–54)
HCT VFR BLD CALC: 18 %PCV (ref 36–54)
HCT VFR BLD CALC: 20 %PCV (ref 36–54)
HCT VFR BLD CALC: 22 %PCV (ref 36–54)
HCT VFR BLD CALC: 26 %PCV (ref 36–54)
HGB BLD-MCNC: 8.7 G/DL (ref 14–18)
LACTATE SERPL-SCNC: 1.3 MMOL/L (ref 0.5–2.2)
MCH RBC QN AUTO: 29.3 PG (ref 27–31)
MCHC RBC AUTO-ENTMCNC: 34.1 G/DL (ref 32–36)
MCV RBC AUTO: 86 FL (ref 82–98)
PCO2 BLDA: 32.1 MMHG (ref 35–45)
PCO2 BLDA: 33.3 MMHG (ref 35–45)
PCO2 BLDA: 33.5 MMHG (ref 35–45)
PCO2 BLDA: 34.2 MMHG (ref 35–45)
PCO2 BLDA: 34.9 MMHG (ref 35–45)
PCO2 BLDA: 35.2 MMHG (ref 35–45)
PH SMN: 7.37 [PH] (ref 7.35–7.45)
PH SMN: 7.38 [PH] (ref 7.35–7.45)
PH SMN: 7.39 [PH] (ref 7.35–7.45)
PH SMN: 7.41 [PH] (ref 7.35–7.45)
PH SMN: 7.42 [PH] (ref 7.35–7.45)
PH SMN: 7.42 [PH] (ref 7.35–7.45)
PLATELET # BLD AUTO: 417 K/UL (ref 150–450)
PMV BLD AUTO: 10.9 FL (ref 9.2–12.9)
PO2 BLDA: 112 MMHG (ref 80–100)
PO2 BLDA: 157 MMHG (ref 80–100)
PO2 BLDA: 189 MMHG (ref 80–100)
PO2 BLDA: 205 MMHG (ref 80–100)
PO2 BLDA: 219 MMHG (ref 80–100)
PO2 BLDA: 242 MMHG (ref 80–100)
POC BE: -2 MMOL/L
POC BE: -2 MMOL/L
POC BE: -3 MMOL/L
POC BE: -4 MMOL/L
POC BE: -5 MMOL/L
POC BE: -6 MMOL/L
POC IONIZED CALCIUM: 1.08 MMOL/L (ref 1.06–1.42)
POC IONIZED CALCIUM: 1.12 MMOL/L (ref 1.06–1.42)
POC IONIZED CALCIUM: 1.12 MMOL/L (ref 1.06–1.42)
POC IONIZED CALCIUM: 1.17 MMOL/L (ref 1.06–1.42)
POC IONIZED CALCIUM: 1.19 MMOL/L (ref 1.06–1.42)
POC IONIZED CALCIUM: 1.26 MMOL/L (ref 1.06–1.42)
POC SATURATED O2: 100 % (ref 95–100)
POC SATURATED O2: 98 % (ref 95–100)
POC SATURATED O2: 99 % (ref 95–100)
POC TCO2: 20 MMOL/L (ref 23–27)
POC TCO2: 21 MMOL/L (ref 23–27)
POC TCO2: 22 MMOL/L (ref 23–27)
POC TCO2: 22 MMOL/L (ref 23–27)
POC TCO2: 23 MMOL/L (ref 23–27)
POC TCO2: 24 MMOL/L (ref 23–27)
POCT GLUCOSE: 110 MG/DL (ref 70–110)
POCT GLUCOSE: 235 MG/DL (ref 70–110)
POCT GLUCOSE: 262 MG/DL (ref 70–110)
POCT GLUCOSE: 280 MG/DL (ref 70–110)
POCT GLUCOSE: 322 MG/DL (ref 70–110)
POTASSIUM BLD-SCNC: 3.6 MMOL/L (ref 3.5–5.1)
POTASSIUM BLD-SCNC: 3.9 MMOL/L (ref 3.5–5.1)
POTASSIUM BLD-SCNC: 4.2 MMOL/L (ref 3.5–5.1)
POTASSIUM BLD-SCNC: 4.3 MMOL/L (ref 3.5–5.1)
POTASSIUM BLD-SCNC: 4.4 MMOL/L (ref 3.5–5.1)
POTASSIUM BLD-SCNC: 4.6 MMOL/L (ref 3.5–5.1)
POTASSIUM SERPL-SCNC: 5.3 MMOL/L (ref 3.5–5.1)
PROT SERPL-MCNC: 5.2 G/DL (ref 6–8.4)
RBC # BLD AUTO: 2.97 M/UL (ref 4.6–6.2)
SAMPLE: ABNORMAL
SODIUM BLD-SCNC: 140 MMOL/L (ref 136–145)
SODIUM BLD-SCNC: 141 MMOL/L (ref 136–145)
SODIUM BLD-SCNC: 142 MMOL/L (ref 136–145)
SODIUM SERPL-SCNC: 139 MMOL/L (ref 136–145)
WBC # BLD AUTO: 22.65 K/UL (ref 3.9–12.7)

## 2024-09-27 PROCEDURE — 85027 COMPLETE CBC AUTOMATED: CPT | Performed by: HOSPITALIST

## 2024-09-27 PROCEDURE — 25000003 PHARM REV CODE 250: Performed by: STUDENT IN AN ORGANIZED HEALTH CARE EDUCATION/TRAINING PROGRAM

## 2024-09-27 PROCEDURE — 83605 ASSAY OF LACTIC ACID: CPT | Performed by: HOSPITALIST

## 2024-09-27 PROCEDURE — 80053 COMPREHEN METABOLIC PANEL: CPT | Performed by: HOSPITALIST

## 2024-09-27 PROCEDURE — 27201037 HC PRESSURE MONITORING SET UP

## 2024-09-27 PROCEDURE — 21400001 HC TELEMETRY ROOM

## 2024-09-27 PROCEDURE — 99232 SBSQ HOSP IP/OBS MODERATE 35: CPT | Mod: ,,,

## 2024-09-27 PROCEDURE — 63600175 PHARM REV CODE 636 W HCPCS: Performed by: STUDENT IN AN ORGANIZED HEALTH CARE EDUCATION/TRAINING PROGRAM

## 2024-09-27 PROCEDURE — 36415 COLL VENOUS BLD VENIPUNCTURE: CPT | Performed by: HOSPITALIST

## 2024-09-27 PROCEDURE — 25000003 PHARM REV CODE 250: Performed by: HOSPITALIST

## 2024-09-27 PROCEDURE — 63600175 PHARM REV CODE 636 W HCPCS

## 2024-09-27 PROCEDURE — 51798 US URINE CAPACITY MEASURE: CPT

## 2024-09-27 RX ORDER — SODIUM BICARBONATE 650 MG/1
650 TABLET ORAL 3 TIMES DAILY
Status: DISCONTINUED | OUTPATIENT
Start: 2024-09-27 | End: 2024-10-03

## 2024-09-27 RX ORDER — INSULIN GLARGINE 100 [IU]/ML
20 INJECTION, SOLUTION SUBCUTANEOUS DAILY
Status: DISCONTINUED | OUTPATIENT
Start: 2024-09-27 | End: 2024-10-03

## 2024-09-27 RX ORDER — INSULIN ASPART 100 [IU]/ML
0-10 INJECTION, SOLUTION INTRAVENOUS; SUBCUTANEOUS
Status: DISCONTINUED | OUTPATIENT
Start: 2024-09-27 | End: 2024-10-04 | Stop reason: HOSPADM

## 2024-09-27 RX ADMIN — INSULIN ASPART 4 UNITS: 100 INJECTION, SOLUTION INTRAVENOUS; SUBCUTANEOUS at 07:09

## 2024-09-27 RX ADMIN — NORTRIPTYLINE HYDROCHLORIDE 50 MG: 25 CAPSULE ORAL at 09:09

## 2024-09-27 RX ADMIN — OXACILLIN 12 G: 2 INJECTION, POWDER, FOR SOLUTION INTRAMUSCULAR; INTRAVENOUS at 05:09

## 2024-09-27 RX ADMIN — ACETAMINOPHEN 1000 MG: 325 TABLET ORAL at 05:09

## 2024-09-27 RX ADMIN — PANTOPRAZOLE SODIUM 40 MG: 40 TABLET, DELAYED RELEASE ORAL at 07:09

## 2024-09-27 RX ADMIN — INSULIN ASPART 4 UNITS: 100 INJECTION, SOLUTION INTRAVENOUS; SUBCUTANEOUS at 11:09

## 2024-09-27 RX ADMIN — INSULIN ASPART 6 UNITS: 100 INJECTION, SOLUTION INTRAVENOUS; SUBCUTANEOUS at 07:09

## 2024-09-27 RX ADMIN — SODIUM BICARBONATE 650 MG: 650 TABLET ORAL at 04:09

## 2024-09-27 RX ADMIN — AMLODIPINE BESYLATE 10 MG: 10 TABLET ORAL at 07:09

## 2024-09-27 RX ADMIN — SUCRALFATE 1 G: 1 SUSPENSION ORAL at 06:09

## 2024-09-27 RX ADMIN — ACETAMINOPHEN 1000 MG: 325 TABLET ORAL at 02:09

## 2024-09-27 RX ADMIN — INSULIN GLARGINE 20 UNITS: 100 INJECTION, SOLUTION SUBCUTANEOUS at 09:09

## 2024-09-27 RX ADMIN — SODIUM BICARBONATE 650 MG: 650 TABLET ORAL at 09:09

## 2024-09-27 RX ADMIN — SODIUM CHLORIDE, POTASSIUM CHLORIDE, SODIUM LACTATE AND CALCIUM CHLORIDE: 600; 310; 30; 20 INJECTION, SOLUTION INTRAVENOUS at 02:09

## 2024-09-27 RX ADMIN — ENOXAPARIN SODIUM 40 MG: 40 INJECTION SUBCUTANEOUS at 04:09

## 2024-09-27 RX ADMIN — LOSARTAN POTASSIUM 50 MG: 25 TABLET, FILM COATED ORAL at 07:09

## 2024-09-27 RX ADMIN — MORPHINE SULFATE 4 MG: 4 INJECTION INTRAVENOUS at 01:09

## 2024-09-27 RX ADMIN — METHOCARBAMOL 500 MG: 500 TABLET ORAL at 09:09

## 2024-09-27 RX ADMIN — METHOCARBAMOL 500 MG: 500 TABLET ORAL at 06:09

## 2024-09-27 RX ADMIN — INSULIN ASPART 8 UNITS: 100 INJECTION, SOLUTION INTRAVENOUS; SUBCUTANEOUS at 04:09

## 2024-09-27 RX ADMIN — ACETAMINOPHEN 1000 MG: 325 TABLET ORAL at 09:09

## 2024-09-27 RX ADMIN — CHOLECALCIFEROL TAB 25 MCG (1000 UNIT) 1000 UNITS: 25 TAB at 07:09

## 2024-09-27 RX ADMIN — SUCRALFATE 1 G: 1 SUSPENSION ORAL at 12:09

## 2024-09-27 RX ADMIN — INSULIN ASPART 6 UNITS: 100 INJECTION, SOLUTION INTRAVENOUS; SUBCUTANEOUS at 12:09

## 2024-09-27 NOTE — ASSESSMENT & PLAN NOTE
Present on admit. Patient noted to have high grade stenosis on CTA of right leg. Vascular surgery consulted and patient taken to OR and had unilateral angiogram of right leg and had PT/AT artery stenosis on 9/17 and underwent PTA of right PT/AT arteries. Appreciate Vascular surgery assistance on case.   Concern with wound breakdown in last week and if free flap not successful on 9/26 then only other option may be bka and unfortunately waws not successful and will f/u ortho recs as now BKA is plan

## 2024-09-27 NOTE — BRIEF OP NOTE
.Brief Operative Note     SUMMARY     Surgery Date: 9/26/2024     Surgeons and Role:     * Juanito Cueto,  - Primary     * Tonya Paniagua MD - Resident - Assisting     * Dario Rankin MD - Fellow     * Destin Enamorado MD - Fellow     * Kristian Silva MD - Fellow        Pre-op Diagnosis:  Surgical site infection [T81.49XA]    Post-op Diagnosis:  Surgical site infection [T81.49XA]    Procedure(s) (LRB):  CREATION, FREE FLAP (Right)    Anesthesia: General    Description of Procedure:   Attempted free parascapular flap to RLE medial and lateral wounds    Findings/Key Components:  Parascapular flap harvested. Donor site closed primarily with NATALEE drains x2, incisional vac placed.. After anastamosis of the pedicle vessels, pt did not have inflow. Attempted arterial revision 2 more times. Target vessel unfortunately inadequate for anastamosis. Flap aborted. Wound vac placed over ankle.    Estimated Blood Loss: 400cc    2u pRBC transfused intraop         Specimens Removed:   Specimen (24h ago, onward)      None

## 2024-09-27 NOTE — PROGRESS NOTES
[facility]  Endocrinology  Progress Note    Admit Date: 2024     Reason for Consult: Management of T2DM, Hyperglycemia      Surgical Procedure and Date: S/P irrigation debridement of RLE on 2024    Diabetes diagnosis year:      Home Diabetes Medications:    Humalog via OmniPod insulin pump  Mounjaro 10 mg weekly     Current pump settings:  Basal 12 am to 2.3 and 6 am to 1.55  ICR to 3 at 12 am, 2 at 4 pm  ISF to 1:20   AIT 3 hrs  Target 110-120        Patient had anaphylaxis reaction to SGLT2 inhibitors specifically Invokana     How often checking glucose at home? Dexcom G6   BG readings on regimen: Average 210's per Dexcom  Hypoglycemia on the regimen?  Yes  Missed doses on regimen?  No     Diabetes Complications include:     Hyperglycemia and Diabetic peripheral neuropathy         Complicating diabetes co morbidities:   HLD, HTN, Obesity         HPI: 64 y.o. male presents to the ED w/ complaint of altered mental status. Hx of R ankle fracture with surgery over a month ago. Patient now presents with sepsis 2/2 R ankle infection s/p ORIF on . Started on IV vanc and cefepime. Given IVFs. Blood cultures pending. Brought to OR with ortho on  for irrigation debridement of RLE. Endocrine following for BG and type 2 diabetes.     Lab Results   Component Value Date    LABA1C 10.8 (H) 08/15/2016    HGBA1C 8.5 (H) 2024         Interval HPI:   No acute events overnight. Patient in room 542/542 A. Blood glucose stable. BG above goal on current insulin regimen (SSI, prandial, and basal insulin ). Steroid use- Dexamethasone  8 mg ashley-operatively 1 day ago.     Of note, patient went downstairs this afternoon to eat lunch, no meal time given prior (due to patient being CESAR)       1 Day Post-Op  Renal function-    Lab Results   Component Value Date    CREATININE 1.3 2024       Vasopressors-  None     Diet diabetic  Calorie     Eatin%  Nausea: No  Hypoglycemia and intervention:  "No  Fever: No  TPN and/or TF: No    BP (!) 125/58 (Patient Position: Lying)   Pulse 103   Temp 98.2 °F (36.8 °C) (Oral)   Resp 20   Ht 5' 5" (1.651 m)   Wt 93 kg (205 lb 0.4 oz)   SpO2 (!) 94%   BMI 34.12 kg/m²     Labs Reviewed and Include    Recent Labs   Lab 09/27/24  0449   *   CALCIUM 8.0*   ALBUMIN 1.8*   PROT 5.2*      K 5.3*   CO2 11*      BUN 24*   CREATININE 1.3   ALKPHOS 133   ALT 5*   AST 16   BILITOT 0.7     Lab Results   Component Value Date    WBC 22.65 (H) 09/27/2024    HGB 8.7 (L) 09/27/2024    HCT 25.5 (L) 09/27/2024    MCV 86 09/27/2024     09/27/2024     No results for input(s): "TSH", "FREET4" in the last 168 hours.  Lab Results   Component Value Date    HGBA1C 8.5 (H) 09/06/2024       Nutritional status:   Body mass index is 34.12 kg/m².  Lab Results   Component Value Date    ALBUMIN 1.8 (L) 09/27/2024    ALBUMIN 1.6 (L) 09/26/2024    ALBUMIN 1.6 (L) 09/25/2024     Lab Results   Component Value Date    PREALBUMIN 3 (L) 09/06/2024    PREALBUMIN 13 (L) 07/18/2024       Estimated Creatinine Clearance: 61 mL/min (based on SCr of 1.3 mg/dL).    Accu-Checks  Recent Labs     09/25/24  0733 09/25/24  1123 09/25/24  1542 09/25/24  1948 09/26/24  0755 09/26/24  1128 09/26/24  1247 09/26/24  2245 09/27/24  0030 09/27/24  0726   POCTGLUCOSE 260* 221* 107 85 152* 173* 165* 208* 235* 280*       Current Medications and/or Treatments Impacting Glycemic Control  Immunotherapy:    Immunosuppressants       None          Steroids:   Hormones (From admission, onward)      None          Pressors:    Autonomic Drugs (From admission, onward)      None          Hyperglycemia/Diabetes Medications:   Antihyperglycemics (From admission, onward)      Start     Stop Route Frequency Ordered    09/27/24 0900  insulin glargine U-100 (Lantus) pen 20 Units         -- SubQ Daily 09/27/24 0752    09/26/24 1645  insulin aspart U-100 pen 4-8 Units         -- SubQ 3 times daily with meals 09/26/24 " 1527    09/26/24 0956  insulin aspart U-100 pen 0-10 Units         -- SubQ Every 4 hours PRN 09/26/24 0856            ASSESSMENT and PLAN    Cardiac/Vascular  Hyperlipidemia  On statin per ADA guidelines       Endocrine  Uncontrolled type 2 diabetes mellitus with hyperglycemia  BG goal 140-180    - Lantus (Insulin Glargine) 20 units daily (20% increase due to fasting blood glucose above goal)  - Novolog 4-8 units TIDWM (Administer    4   units if patient eats 25-50% of meal, administer    8    units if patient eats > 50% of meal.)  - [facility] SSI (150/25)  - BG checks AC/HS  - Hypoglycemia protocol in place    ** Please notify Endocrine for any change and/or advance in diet**  ** Please call Endocrine for any BG related issues **    Discharge Planning:   TBD. Please notify endocrinology prior to discharge.        Orthopedic  * Surgical wound breakdown  Optimize BG control to improve wound healing  Managed per primary team              Payton Vora PA-C  Endocrinology  [facility]

## 2024-09-27 NOTE — PT/OT/SLP PROGRESS
Occupational Therapy      Patient Name:  Cortes Schroeder   MRN:  [id number]    Patient not seen today secondary to  (surgery on 9/26 needs new OT orders). Will follow-up after orders are placed.    9/27/2024

## 2024-09-27 NOTE — ASSESSMENT & PLAN NOTE
Closed trimalleolar fracture of right ankle s/p ORIF in 7/2024  Surgical site infection  63 yo male presenting with confusion and worsening redness, swelling and pain to right ankle. Patient with sepsis criteria on admit and right ankle wound felt to be source of infection.   - Ortho consulted and patient taken to OR 9/06/2024 for debridement of surgical wound with wound vac placed. Cultures taken and grew both MSSA and Group B streptococcus from wound. Patient taken back again to OR with Ortho with Dr. Liz and patient had another I&Dand wound vac change on 9/9/2024. Orthopedics took back to OR again on 9/20/2024 with Dr. Liz and underwent another I&D of right ankle and wound and replacement of wound vac. Patient taken back to OR with OR again on 9/23 with Dr. Liz and had another I&D and wound vac change with Orthopedics. Ortho noted anterolateral foot eschar developing. Distal lateral wound breakdown and ortho opened up majority of lateral wound and excised surrounding skin and subcutaneous tissue. Ortho removed all ankle hardware. Wound vacs placed medially and laterally.   - Orthopedics recommended Plastics evaluation for free flap and as part of evaluation recommended Vascular evaluation. Vascular evaluated patient and angiogram of right leg done and PTA done and revascularization of right PT/AT. Plastics plan propeller flap on 9/25/2024 but unfortunately due to presence of lateral and anterior wounds to right ankle no longer candidate for propeller flap so now will need free flap to cover both wounds. Plastics plans to take to OR on 9/26 for free flap to right ankle wounds. Plastics reports if unable to do free flap then patient's only option will be a BKA.  He understands and will f/u further 9/26 OR recs and was in OR for 8 hours with attemptes at targets at least 3 times but was not successful unfortunately and now will plan for BKA with ortho. Will likely not need any antibiotics after OR now  given this, will reconfirm with ID later after BKA and will likely need IP rehab to recover and will plan for PM and R consult after surgery  - Wound cultures -- MSSA and Group B strep from right ankle.   - Blood cultures -- MSSA on admit but repeat blood cultures negative.   - ID consulted and following and appreciate recs:  - Continue IV Oxacillin 12 g every 24 hours continuous infusion. Plan for orals if hardware retained once completed.  - Anticipate 6 weeks of IV antibiotics from date of most recent washout.  - Continue PT/OT and non weight bearing to right lower extremity as per Ortho. Continue wound vac as per Ortho to surgical site.  - Pain controlled and continue multimodals.   - Continue Lovenox 40 mg subcutaneous daily for DVT prophylaxis.

## 2024-09-27 NOTE — ASSESSMENT & PLAN NOTE
Received 2 U during plastic surgery flap attempt on 9/27 with stable hg post op  - Hgb fluctuating and own to 7.6 on 9/25 from 8.3 on 9/24. No clinical signs of bleeding and will monitor.   - Anemia is likely due to acute blood loss which was from surgery. Most recent hemoglobin and hematocrit are listed below.  Recent Labs     09/25/24  0233 09/26/24  0426 09/26/24  1444 09/26/24 2015 09/26/24  2133 09/27/24  0449   HGB 7.6* 7.8*  --   --   --  8.7*   HCT 25.0* 24.5*   < > 18* 22* 25.5*    < > = values in this interval not displayed.       Plan  - Monitor serial CBC: Daily  - Transfuse PRBC if patient becomes hemodynamically unstable, symptomatic or H/H drops below 7/21.  - Patient has not received any PRBC transfusions to date  - Patient's anemia is currently stable and monitor. No indication for blood transfusion at this time.

## 2024-09-27 NOTE — TRANSFER OF CARE
"Anesthesia Transfer of Care Note    Patient: Cortes Schroeder    Procedure(s) Performed: Procedure(s) (LRB):  CREATION, FREE FLAP (Right)    Patient location: PACU    Anesthesia Type: general    Transport from OR: Transported from OR on 6-10 L/min O2 by face mask with adequate spontaneous ventilation    Post pain: adequate analgesia    Post assessment: no apparent anesthetic complications and tolerated procedure well    Post vital signs: stable    Level of consciousness: awake    Nausea/Vomiting: no nausea/vomiting    Complications: none    Transfer of care protocol was followed    Last vitals: Visit Vitals  /61   Pulse 91   Temp 36.7 °C (98.1 °F) (Temporal)   Resp 18   Ht 5' 5" (1.651 m)   Wt 93 kg (205 lb 0.4 oz)   SpO2 100%   BMI 34.12 kg/m²     "

## 2024-09-27 NOTE — PROGRESS NOTES
Plastic and Reconstructive Surgery   Progress Note    Subjective:    No issues  Wound vac in place, incisional vac- minimal output  NATALEE x 2 from back incision, serosang  Has some expected post op pain  Discussion had about attempts at parascapular flap with poor vasc inflow, he did bring up discussion about BKA    Objective:  Vital signs in last 24 hours:  Temp:  [97.6 °F (36.4 °C)-98.2 °F (36.8 °C)] 98.2 °F (36.8 °C)  Pulse:  [] 99  Resp:  [15-26] 20  SpO2:  [94 %-100 %] 94 %  BP: (123-169)/(57-80) 125/58    Intake/Output last 3 shifts:  I/O last 3 completed shifts:  In: 4724 [P.O.:550; Blood:674; IV Piggyback:3500]  Out: 1550 [Urine:1310; Drains:130; Other:110]    Intake/Output this shift:  I/O this shift:  In: -   Out: 330 [Urine:250; Drains:80]        Physical Exam:  VITAL SIGNS:   Vitals:    24 0312 24 0339 24 0714 24 1036   BP:  (!) 123/57 (!) 125/58    BP Location:  Right arm     Patient Position:  Lying Lying    Pulse: 104 106 103 99   Resp:  17 20    Temp:  97.6 °F (36.4 °C) 98.2 °F (36.8 °C)    TempSrc:  Oral Oral    SpO2:  (!) 94% (!) 94%    Weight:       Height:         TMAX: Temp (24hrs), Av °F (36.7 °C), Min:97.6 °F (36.4 °C), Max:98.2 °F (36.8 °C)      General: Alert; No acute distress  Cardiovascular: Regular rate   Respiratory: Normal respiratory effort. Chest rise symmetric.   Abdomen: Soft, nontender, nondistended  Extremity:  RLE vac in place , able to wiggle toes, sensation intact, splint in place, Wound vac - serosang  Incisional vac on back incision, adequate suction, minimal output, NATALEE x2 serosang, no hematoma,     Neurologic: No focal deficit. Speech normal      Scheduled Medications acetaminophen, 1,000 mg, Q8H  amLODIPine, 10 mg, Daily  enoxaparin, 40 mg, Daily  insulin aspart U-100, 4-8 Units, TIDWM  insulin glargine U-100, 20 Units, Daily  losartan, 50 mg, Daily  nortriptyline, 50 mg, Nightly  oxacillin 12 g in  mL CONTINUOUS INFUSION, 12 g,  Q24H  pantoprazole, 40 mg, Daily  polyethylene glycol, 17 g, BID  senna, 8.6 mg, BID  sodium bicarbonate, 650 mg, TID  sucralfate, 1 g, Q6H  vitamin D, 1,000 Units, Daily        PRN Medications     Current Facility-Administered Medications:     0.9%  NaCl infusion (for blood administration), , Intravenous, Q24H PRN    0.9%  NaCl infusion (for blood administration), , Intravenous, Q24H PRN    albuterol-ipratropium, 3 mL, Nebulization, Q4H PRN    calcium carbonate, 1,000 mg, Oral, TID PRN    dextrose 10%, 12.5 g, Intravenous, PRN    dextrose 10%, 12.5 g, Intravenous, PRN    dextrose 10%, 12.5 g, Intravenous, PRN    dextrose 10%, 25 g, Intravenous, PRN    dextrose 10%, 25 g, Intravenous, PRN    dextrose 10%, 25 g, Intravenous, PRN    glucagon (human recombinant), 1 mg, Intramuscular, PRN    glucagon (human recombinant), 1 mg, Intramuscular, PRN    glucose, 16 g, Oral, PRN    glucose, 24 g, Oral, PRN    hydrALAZINE, 25 mg, Oral, Q8H PRN    insulin aspart U-100, 0-10 Units, Subcutaneous, QID (AC + HS) PRN    methocarbamoL, 500 mg, Oral, TID PRN    morphine, 2 mg, Intravenous, Q6H PRN    morphine, 4 mg, Intravenous, Q6H PRN    ondansetron, 8 mg, Oral, Q8H PRN    prochlorperazine, 5 mg, Intravenous, Q30 Min PRN    Recent Labs:   Lab Results   Component Value Date    WBC 22.65 (H) 09/27/2024    HGB 8.7 (L) 09/27/2024    HCT 25.5 (L) 09/27/2024    MCV 86 09/27/2024     09/27/2024     Lab Results   Component Value Date     (H) 09/27/2024     09/27/2024    K 5.3 (H) 09/27/2024     09/27/2024    BUN 24 (H) 09/27/2024         Assessment:   64 y.o. y/o male s/p Procedure(s):  REPLACEMENT, WOUND VAC; white & black sponge  REMOVAL, HARDWARE, ANKLE      1 Day Post-Op     64 y.o.male W/ right ankle post-surgical medial wound dehiscence and exposed hardware.      Now s/p PTA RLE PT/AT, completion angiogram showing 3 vessel run off, also washout and vac exchange    S/p Attempted Parascapular flap- however  calcified PT and poor inflow     Plan  - Drains: NATALEE x2 ,Please do not remove plastic surgery drains, incisional wound vac also in place  - RLE wound vac replaced  - Reg diet  - Abx: per ID  - DVT ppx, IS, OOB, ambulate, PT/OT  - Appreciate Ortho Recs  -Had long discussion at bedside with patient and he is amenable to BKA. Further management per Ortho.     Destin Enamorado MD   Plastic Surgery Fellow  9/27/2024

## 2024-09-27 NOTE — NURSING
Patient arrived to unit via Bed, VSS, NADN at this time. bed in lowest position, call light in reach along with personal items.

## 2024-09-27 NOTE — NURSING
Nurses Note -- 4 Eyes      9/27/2024   1:30 AM      Skin assessed during: Transfer      [x] No Altered Skin Integrity Present    []Prevention Measures Documented      [] Yes- Altered Skin Integrity Present or Discovered   [] LDA Added if Not in Epic (Describe Wound)   [] New Altered Skin Integrity was Present on Admit and Documented in LDA   [] Wound Image Taken    Wound Care Consulted? No    Attending Nurse:  Abdi Hoffmann RN/Staff Member:  Carlos PICKETT

## 2024-09-27 NOTE — ANESTHESIA POSTPROCEDURE EVALUATION
Anesthesia Post Evaluation    Patient: Cortes Schroeder    Procedure(s) Performed: Procedure(s) (LRB):  CREATION, FREE FLAP (Right)    Final Anesthesia Type: general      Patient location during evaluation: PACU  Patient participation: Yes- Able to Participate  Level of consciousness: awake and alert  Post-procedure vital signs: reviewed and stable  Pain management: adequate  Airway patency: patent    PONV status at discharge: No PONV  Anesthetic complications: no      Cardiovascular status: blood pressure returned to baseline  Respiratory status: unassisted  Hydration status: euvolemic  Follow-up not needed.          Vitals Value Taken Time   /61 09/26/24 2256   Temp 36.7 °C (98.1 °F) 09/26/24 2217   Pulse 92 09/26/24 2258   Resp 15 09/26/24 2258   SpO2 96 % 09/26/24 2258   Vitals shown include unfiled device data.      No case tracking events are documented in the log.      Pain/Judy Score: Pain Rating Prior to Med Admin: 6 (9/26/2024 10:50 PM)  Pain Rating Post Med Admin: 1 (9/25/2024 11:18 PM)  Judy Score: 8 (9/26/2024 10:45 PM)

## 2024-09-27 NOTE — SUBJECTIVE & OBJECTIVE
Principal Problem:Surgical wound breakdown    Principal Orthopedic Problem: s/p I&D and wound vac placement on 09/06, 09/20     Interval History: POD1 after attempted free flap - no inflow at anastamosis. Patient understandably upset and grieving this morning but has very good insight and is looking forward to BKA and eventually leaving the hospital for recovery.        Review of patient's allergies indicates:   Allergen Reactions    Invokana [canagliflozin] Anaphylaxis    Percocet [oxycodone-acetaminophen] Nausea Only and Hallucinations    Biaxin [clarithromycin]     Hydrocodone Other (See Comments)     Dizzy/nausea/hallucinations    Sulfa (sulfonamide antibiotics) Nausea Only and Rash       Current Facility-Administered Medications   Medication    0.9%  NaCl infusion (for blood administration)    0.9%  NaCl infusion (for blood administration)    acetaminophen tablet 1,000 mg    albuterol-ipratropium 2.5 mg-0.5 mg/3 mL nebulizer solution 3 mL    amLODIPine tablet 10 mg    calcium carbonate 200 mg calcium (500 mg) chewable tablet 1,000 mg    dextrose 10% bolus 125 mL 125 mL    dextrose 10% bolus 125 mL 125 mL    dextrose 10% bolus 125 mL 125 mL    dextrose 10% bolus 250 mL 250 mL    dextrose 10% bolus 250 mL 250 mL    dextrose 10% bolus 250 mL 250 mL    enoxaparin injection 40 mg    glucagon (human recombinant) injection 1 mg    glucagon (human recombinant) injection 1 mg    glucose chewable tablet 16 g    glucose chewable tablet 24 g    hydrALAZINE tablet 25 mg    insulin aspart U-100 pen 0-10 Units    insulin aspart U-100 pen 4-8 Units    insulin glargine U-100 (Lantus) pen 20 Units    lactated ringers infusion    losartan tablet 50 mg    methocarbamoL tablet 500 mg    morphine injection 2 mg    morphine injection 4 mg    nortriptyline capsule 50 mg    ondansetron disintegrating tablet 8 mg    oxacillin 12 g in  mL CONTINUOUS INFUSION    pantoprazole EC tablet 40 mg    polyethylene glycol packet 17 g     "prochlorperazine injection Soln 5 mg    senna tablet 8.6 mg    sodium bicarbonate tablet 650 mg    sucralfate 100 mg/mL suspension 1 g    vitamin D 1000 units tablet 1,000 Units     Objective:     Vital Signs (Most Recent):  Temp: 98.2 °F (36.8 °C) (09/27/24 0714)  Pulse: 103 (09/27/24 0714)  Resp: 20 (09/27/24 0714)  BP: (!) 125/58 (09/27/24 0714)  SpO2: (!) 94 % (09/27/24 0714) Vital Signs (24h Range):  Temp:  [97.6 °F (36.4 °C)-98.2 °F (36.8 °C)] 98.2 °F (36.8 °C)  Pulse:  [] 103  Resp:  [15-26] 20  SpO2:  [94 %-100 %] 94 %  BP: (123-169)/(57-80) 125/58     Weight: 93 kg (205 lb 0.4 oz)  Height: 5' 5" (165.1 cm)  Body mass index is 34.12 kg/m².      Intake/Output Summary (Last 24 hours) at 9/27/2024 0951  Last data filed at 9/27/2024 0749  Gross per 24 hour   Intake 4524 ml   Output 1170 ml   Net 3354 ml          Ortho/SPM Exam     AAOx4  NAD  Tachycardic  No increased WOB    RLE  Medial wound vac to good suction  Lateral side with fibrinous exudate and weeping ecchymosis distally  Compartments soft/compressible  Sensation diminished (at baseline)   Active toe dorsiflexion & plantarflexion  WWP. Brisk cap refill      Significant Labs:     Recent Labs   Lab 09/27/24  0449   WBC 22.65*   RBC 2.97*   HGB 8.7*   HCT 25.5*      MCV 86   MCH 29.3   MCHC 34.1         Significant Imaging: I have reviewed and interpreted all pertinent imaging results/findings.    "

## 2024-09-27 NOTE — SUBJECTIVE & OBJECTIVE
"Interval HPI:   No acute events overnight. Patient in room 542/542 A. Blood glucose stable. BG above goal on current insulin regimen (SSI, prandial, and basal insulin ). Steroid use- Dexamethasone  8 mg ashley-operatively 1 day ago.   1 Day Post-Op  Renal function-    Lab Results   Component Value Date    CREATININE 1.3 2024       Vasopressors-  None     Diet diabetic  Calorie     Eatin%  Nausea: No  Hypoglycemia and intervention: No  Fever: No  TPN and/or TF: No    BP (!) 125/58 (Patient Position: Lying)   Pulse 103   Temp 98.2 °F (36.8 °C) (Oral)   Resp 20   Ht 5' 5" (1.651 m)   Wt 93 kg (205 lb 0.4 oz)   SpO2 (!) 94%   BMI 34.12 kg/m²     Labs Reviewed and Include    Recent Labs   Lab 24  0449   *   CALCIUM 8.0*   ALBUMIN 1.8*   PROT 5.2*      K 5.3*   CO2 11*      BUN 24*   CREATININE 1.3   ALKPHOS 133   ALT 5*   AST 16   BILITOT 0.7     Lab Results   Component Value Date    WBC 22.65 (H) 2024    HGB 8.7 (L) 2024    HCT 25.5 (L) 2024    MCV 86 2024     2024     No results for input(s): "TSH", "FREET4" in the last 168 hours.  Lab Results   Component Value Date    HGBA1C 8.5 (H) 2024       Nutritional status:   Body mass index is 34.12 kg/m².  Lab Results   Component Value Date    ALBUMIN 1.8 (L) 2024    ALBUMIN 1.6 (L) 2024    ALBUMIN 1.6 (L) 2024     Lab Results   Component Value Date    PREALBUMIN 3 (L) 2024    PREALBUMIN 13 (L) 2024       Estimated Creatinine Clearance: 61 mL/min (based on SCr of 1.3 mg/dL).    Accu-Checks  Recent Labs     24  0733 24  1123 24  1542 24  1948 24  0755 24  1128 24  1247 24  2245 24  0030 24  0726   POCTGLUCOSE 260* 221* 107 85 152* 173* 165* 208* 235* 280*       Current Medications and/or Treatments Impacting Glycemic Control  Immunotherapy:    Immunosuppressants       None          Steroids: "   Hormones (From admission, onward)      None          Pressors:    Autonomic Drugs (From admission, onward)      None          Hyperglycemia/Diabetes Medications:   Antihyperglycemics (From admission, onward)      Start     Stop Route Frequency Ordered    09/27/24 0900  insulin glargine U-100 (Lantus) pen 20 Units         -- SubQ Daily 09/27/24 0752    09/26/24 1645  insulin aspart U-100 pen 4-8 Units         -- SubQ 3 times daily with meals 09/26/24 1527    09/26/24 0956  insulin aspart U-100 pen 0-10 Units         -- SubQ Every 4 hours PRN 09/26/24 0856

## 2024-09-27 NOTE — ASSESSMENT & PLAN NOTE
Cortes Schroeder is a 64 y.o. male with PMH of DM, HTN  and right trimalleolar ankle fracture status post ex fix on 07/18 with subsequent definitive fixation on 07/26 admitted with right ankle wound dehiscence in the setting of uncontrolled diabetes.      He is s/p I&D of R ankle 09/06. Repeat I&D R medial ankle 09/09. 9/17 angiography and medial ankle wound vac exchange.   To OR 9/20 and 9/23 for repeat I&D and vac exchange. Hardware removed on 9/23.   Free flap aborted 9/26 due to poor vascular inflow. Plan is for right BKA - will optimize patient and discuss timing with staff.     Pain control: multimodal regimen  PT/OT:  NWB RLE   DVT PPx: Lvx   Abx:  oxacillin continuous; ID following   Cultures:  ankle cx staph +    Dispo: pending

## 2024-09-27 NOTE — SUBJECTIVE & OBJECTIVE
Interval History: patient was in OR yesterday with plastics for 8 hours or so attempting to do free flap but attempts x 3 were unsuccessful and his rooomate hugo reported dr ritchie called him senior living through with updates and then called him after it was done and updated him on all of the procedure and trials they undertook. He is not in the best spirits today, he said he wants to dc today and will come back for the BKA but I told him that that isnt an option as he has to be on iv antibitoics and istn feasible or safe to come back and discharge today and his romomate hugo also told him that would nto be able to do that with his current state and that getting very close to completing stay and now just have to do this surgery now with ortho and if all goes straight forward would be about 3 days post op so could be possible late next week hed be ready for dc and would not need antibitoics if received a BKA and would confirm this with ID. Likely would be IP rehab candidate after a BKA and discussed this with them as well. He is fatigued from being in hospital. He is going to go to the lobby today with hugo and visit the cafeteria and the gift shop and see some fresh air and updated nursing tram who will get a WC and help him with this and on board with plan. To remove delvalle today. Pain meds PRn as some pain to the ankle today. He asked if could talk to dr jacome texted to him to see if available later today to drop by.    Review of Systems   Constitutional:  Negative for fever.   Respiratory:  Negative for shortness of breath.    Cardiovascular:  Negative for chest pain.   Gastrointestinal:  Negative for nausea and vomiting.   Musculoskeletal:  Positive for arthralgias (Right ankle).   Psychiatric/Behavioral:  Negative for confusion.      Objective:     Vital Signs (Most Recent):  Temp: 97.5 °F (36.4 °C) (09/25/24 1540)  Pulse: 79 (09/25/24 1540)  Resp: 19 (09/25/24 1540)  BP: 158/76 (09/25/24 1540)  SpO2: 96 %  (09/25/24 1540) on room air Vital Signs (24h Range):  Temp:  [97.6 °F (36.4 °C)-98.2 °F (36.8 °C)] 98.2 °F (36.8 °C)  Pulse:  [] 99  Resp:  [15-26] 20  SpO2:  [94 %-100 %] 94 %  BP: (123-169)/(57-80) 125/58     Weight: 93 kg (205 lb 0.4 oz)  Body mass index is 34.12 kg/m².    Intake/Output Summary (Last 24 hours) at 9/27/2024 1111  Last data filed at 9/27/2024 0749  Gross per 24 hour   Intake 4524 ml   Output 1170 ml   Net 3354 ml         Physical Exam  Vitals and nursing note reviewed.   Constitutional:       General: He is awake. He is not in acute distress.     Appearance: Normal appearance. He is well-developed. He is obese.      Comments: Patient sitting up in bed iwith roommate hugo at bedside. .    Eyes:      Conjunctiva/sclera: Conjunctivae normal.   Cardiovascular:      Rate and Rhythm: Normal rate and regular rhythm.      Heart sounds: Normal heart sounds. No murmur heard.  Pulmonary:      Effort: Pulmonary effort is normal. No respiratory distress.      Breath sounds: Normal breath sounds. No wheezing.   Abdominal:      General: Abdomen is flat. Bowel sounds are normal. There is no distension.      Palpations: Abdomen is soft.      Tenderness: There is no abdominal tenderness.   Musculoskeletal:      Left lower leg: No edema.      Comments: Wound vac in place to right ankle and ACE bandage overlying wound vac.    Skin:     General: Skin is warm.      Findings: No erythema.   Neurological:      Mental Status: He is alert and oriented to person, place, and time.   Psychiatric:         Mood and Affect: Mood normal.         Behavior: Behavior normal. Behavior is cooperative.         Thought Content: Thought content normal.         Judgment: Judgment normal.             Significant Labs: CBC:   Recent Labs   Lab 09/26/24  0426 09/26/24  1444 09/26/24 2015 09/26/24  2133 09/27/24  0449   WBC 10.16  --   --   --  22.65*   HGB 7.8*  --   --   --  8.7*   HCT 24.5*   < > 18* 22* 25.5*   *  --   --    --  417    < > = values in this interval not displayed.     CMP:   Recent Labs   Lab 09/26/24  0426 09/27/24  0449    139   K 4.1 5.3*    109   CO2 20* 11*    272*   BUN 18 24*   CREATININE 1.1 1.3   CALCIUM 7.8* 8.0*   PROT 6.0 5.2*   ALBUMIN 1.6* 1.8*   BILITOT 0.3 0.7   ALKPHOS 168* 133   AST 16 16   ALT 6* 5*   ANIONGAP 10 19*     Blood Sugars (AccuCheck):    Recent Labs     09/26/24  0755 09/26/24  1128 09/26/24  1247 09/26/24  2245 09/27/24  0030 09/27/24  0726   POCTGLUCOSE 152* 173* 165* 208* 235* 280*       Significant Imaging: I have reviewed all pertinent imaging results/findings within the past 24 hours.

## 2024-09-27 NOTE — ASSESSMENT & PLAN NOTE
Bicarb 11 on 92/7 unclear reasons, will check lactic acid and resume na bicarb  Resolved. Discontinue sodium bicarbonate tablets on 9/22.   Related to DEEPALI. Patient started on sodium bicarbonate tablets and will continue. Monitor daily HCO3 levels with labs.

## 2024-09-27 NOTE — PROGRESS NOTES
[facility] Medicine  Progress Note    Patient Name: Cortes Schroeder  MRN: [id number]  Patient Class: IP- Inpatient   Admission Date: 9/6/2024  Length of Stay: 21 days  Attending Physician: Maria Del Rosario Medina MD  Primary Care Provider: Andrew Sinclair Jr., MD        Subjective:     Principal Problem:Surgical wound breakdown        HPI:  Crotes Schroeder is a 64 y.o. M with PMHx of anxiety, T2DM, GERD, HLD, HTN who presented to ED for AMS. He had a fall in July that resulted in R trimalleolar fracture and L distal radius fracture. He had external fixation on 07/18 and then ORIF on 07/26 with Dr. Liz. He was prescribed clinda 300 mg on 08/28 for a ten day course. Patient was doing well when he acutely became altered and not acting like himself a few hours ago. Patient family member drove him here from [facility] for ortho consult. R medial ankle wound dehisced with redness and swelling around it. No CPM fever, cough, N/V/D or seizure.    In ED: T max 99.1. SIRS 2/4 with WBC 18 and . CMP notable for Na 127, Cr 1.7, . Phos 1.9. .3. BNP 73. Trop neg. A1c 8.5. R tib fib XR notes There are 2 abandoned anterior-posteriorly oriented pin tracks in the right mid tibial shaft.  On AP views, each of these pin tracks demonstrates a small faint internal density, possibly representing a sequestrum. Ortho and endocrine consulted. Given IV vanc and IV clindamycin. Given 2.7L IVFs. Admitted to hospital medicine for sepsis 2/2 infected hardware.     Overview/Hospital Course:  Cortes Schroeder is a 65 y/o male admitted to hospital medicine for sepsis related to right ankle from surgical wound infection in patient with recent ORIF of right ankle fracture done on 7/26/2024. Patient started on empiric IV Vancomycin and IV Cefepime and given IVF's as per sepsis protocol. Orthopedics consulted and patient taken to OR on 9/6/2024 and had irrigation debridement of right surgical incision and  placement of wound vac.Endocrine consulted to assist in management of his diabetes while in hospital. Blood cultures from admit returned with MSSA. Infectious Disease consulted. Wound culture returned positive for Group B streptococcus and MSSA. Echo done due to bacteremia without concern for vegetations. ID changed antibiotics to IV Oxacillin. Patient with new cough and worsening WBC. CTA chest negative for PE but notes small area of ground glass changes to RUL. Patient placed on 5 day course of po Doxycycline to treat as per ID and completed for possible pneumonia. Patient taken back to OR on 09/09/2024 with Ortho and had another irrigation and debridement and replacement of wound vac to right ankle. Plastics consulted for flap evaluation and recommended vascular evaluation. CTA right lower extremity done and showed moderate atherosclerosis and multifocal high grade stenosis throughout right calf arteries. Dietary consulted for malnutrition and started on Boost supplementation to maximize his nutrition for a flap and wound healing. Vascular surgery consulted per Plastics recs as they would like to pre-optimize blood flow prior to any free flap or pedicled propellar flap. Vascular recommended angiogram of right leg but patient developed DEEPALI. Nephrology consulted and suspected ATN related to contrast nephropathy. Patient placed on supportive care. DEEPALI resolved. Patyoanetn taken for unilateral angiogram by Vascular on 9/17 and underwent PTA of right posterior tibial artery and right anterior tibial artery. After revascularization of right leg pl;an to do flap but working on timing with Ortho and Plastics. Patient to go back to OR again on 9/20 for another irrigation and debridement and replacement of wound vac to right ankle wound by Ortho. Plastics plans propeller flap to right ankle on 9/25 and then will need to remain in hospital for 1 week after flap for dangling protocol and monitoring of flap per Plastics. Repeat  blood cultures from 9/8 and 9/10 no growth. Patient taken back to OR on 9/20 with Dr. Moe Liz and had another I&D of right ankle and wound and replacement of wound vac. Patient to remain non weight bearing post-op and Plastics plans flap placement while in hospital on 9/25. Patient to go back to OR on for another I&D and wound vac change on 9/23. Patient taken back to OR on 9/23 with Dr. Liz and had another I&D and wound vac change with Orthopedics. Ortho noted anterolateral foot eschar developing. Distal lateral wound breakdown and ortho opened up majority of lateral wound and excised surrounding skin and subcutaneous tissue. Ortho removed all ankle hardware. Wound vacs placed medially and laterally. Patient re-evaluated by Plastics on 9/25 and state due to lateral wound in addition to anterior wound no longer a candidate for propellar flap and plastic will need to do free flap to cover both wounds. Plastics concerned if no adequate vascular targets, will be unable to cover and BKA is only option. Patient not very happy. Patient to go to OR On 9/26 for free flap.     Interval History: patient was in OR yesterday with plastics for 8 hours or so attempting to do free flap but attempts x 3 were unsuccessful and his rooomate hugo reported dr ritchie called him senior care through with updates and then called him after it was done and updated him on all of the procedure and trials they undertook. He is not in the best spirits today, he said he wants to dc today and will come back for the BKA but I told him that that isnt an option as he has to be on iv antibitoics and istn feasible or safe to come back and discharge today and his romomate hugo also told him that would nto be able to do that with his current state and that getting very close to completing stay and now just have to do this surgery now with ortho and if all goes straight forward would be about 3 days post op so could be possible late next week hed be  ready for dc and would not need antibitoics if received a BKA and would confirm this with ID. Likely would be IP rehab candidate after a BKA and discussed this with them as well. He is fatigued from being in hospital. He is going to go to the lobby today with hugo and visit the cafeteria and the gift shop and see some fresh air and updated nursing tram who will get a WC and help him with this and on board with plan. To remove delvalle today. Pain meds PRn as some pain to the ankle today. He asked if could talk to dr jacome texted to him to see if available later today to drop by.    Review of Systems   Constitutional:  Negative for fever.   Respiratory:  Negative for shortness of breath.    Cardiovascular:  Negative for chest pain.   Gastrointestinal:  Negative for nausea and vomiting.   Musculoskeletal:  Positive for arthralgias (Right ankle).   Psychiatric/Behavioral:  Negative for confusion.      Objective:     Vital Signs (Most Recent):  Temp: 97.5 °F (36.4 °C) (09/25/24 1540)  Pulse: 79 (09/25/24 1540)  Resp: 19 (09/25/24 1540)  BP: 158/76 (09/25/24 1540)  SpO2: 96 % (09/25/24 1540) on room air Vital Signs (24h Range):  Temp:  [97.6 °F (36.4 °C)-98.2 °F (36.8 °C)] 98.2 °F (36.8 °C)  Pulse:  [] 99  Resp:  [15-26] 20  SpO2:  [94 %-100 %] 94 %  BP: (123-169)/(57-80) 125/58     Weight: 93 kg (205 lb 0.4 oz)  Body mass index is 34.12 kg/m².    Intake/Output Summary (Last 24 hours) at 9/27/2024 1111  Last data filed at 9/27/2024 0749  Gross per 24 hour   Intake 4524 ml   Output 1170 ml   Net 3354 ml         Physical Exam  Vitals and nursing note reviewed.   Constitutional:       General: He is awake. He is not in acute distress.     Appearance: Normal appearance. He is well-developed. He is obese.      Comments: Patient sitting up in bed iwith roommate hugo at bedside. .    Eyes:      Conjunctiva/sclera: Conjunctivae normal.   Cardiovascular:      Rate and Rhythm: Normal rate and regular rhythm.      Heart  sounds: Normal heart sounds. No murmur heard.  Pulmonary:      Effort: Pulmonary effort is normal. No respiratory distress.      Breath sounds: Normal breath sounds. No wheezing.   Abdominal:      General: Abdomen is flat. Bowel sounds are normal. There is no distension.      Palpations: Abdomen is soft.      Tenderness: There is no abdominal tenderness.   Musculoskeletal:      Left lower leg: No edema.      Comments: Wound vac in place to right ankle and ACE bandage overlying wound vac.    Skin:     General: Skin is warm.      Findings: No erythema.   Neurological:      Mental Status: He is alert and oriented to person, place, and time.   Psychiatric:         Mood and Affect: Mood normal.         Behavior: Behavior normal. Behavior is cooperative.         Thought Content: Thought content normal.         Judgment: Judgment normal.             Significant Labs: CBC:   Recent Labs   Lab 09/26/24  0426 09/26/24  1444 09/26/24 2015 09/26/24  2133 09/27/24  0449   WBC 10.16  --   --   --  22.65*   HGB 7.8*  --   --   --  8.7*   HCT 24.5*   < > 18* 22* 25.5*   *  --   --   --  417    < > = values in this interval not displayed.     CMP:   Recent Labs   Lab 09/26/24  0426 09/27/24  0449    139   K 4.1 5.3*    109   CO2 20* 11*    272*   BUN 18 24*   CREATININE 1.1 1.3   CALCIUM 7.8* 8.0*   PROT 6.0 5.2*   ALBUMIN 1.6* 1.8*   BILITOT 0.3 0.7   ALKPHOS 168* 133   AST 16 16   ALT 6* 5*   ANIONGAP 10 19*     Blood Sugars (AccuCheck):    Recent Labs     09/26/24  0755 09/26/24  1128 09/26/24  1247 09/26/24  2245 09/27/24  0030 09/27/24  0726   POCTGLUCOSE 152* 173* 165* 208* 235* 280*       Significant Imaging: I have reviewed all pertinent imaging results/findings within the past 24 hours.    Assessment/Plan:      * Surgical wound breakdown  Closed trimalleolar fracture of right ankle s/p ORIF in 7/2024  Surgical site infection  65 yo male presenting with confusion and worsening redness, swelling and  pain to right ankle. Patient with sepsis criteria on admit and right ankle wound felt to be source of infection.   - Ortho consulted and patient taken to OR 9/06/2024 for debridement of surgical wound with wound vac placed. Cultures taken and grew both MSSA and Group B streptococcus from wound. Patient taken back again to OR with Ortho with Dr. Liz and patient had another I&Dand wound vac change on 9/9/2024. Orthopedics took back to OR again on 9/20/2024 with Dr. Liz and underwent another I&D of right ankle and wound and replacement of wound vac. Patient taken back to OR with OR again on 9/23 with Dr. Liz and had another I&D and wound vac change with Orthopedics. Ortho noted anterolateral foot eschar developing. Distal lateral wound breakdown and ortho opened up majority of lateral wound and excised surrounding skin and subcutaneous tissue. Ortho removed all ankle hardware. Wound vacs placed medially and laterally.   - Orthopedics recommended Plastics evaluation for free flap and as part of evaluation recommended Vascular evaluation. Vascular evaluated patient and angiogram of right leg done and PTA done and revascularization of right PT/AT. Plastics plan propeller flap on 9/25/2024 but unfortunately due to presence of lateral and anterior wounds to right ankle no longer candidate for propeller flap so now will need free flap to cover both wounds. Plastics plans to take to OR on 9/26 for free flap to right ankle wounds. Plastics reports if unable to do free flap then patient's only option will be a BKA.  He understands and will f/u further 9/26 OR recs and was in OR for 8 hours with attemptes at targets at least 3 times but was not successful unfortunately and now will plan for BKA with ortho. Will likely not need any antibiotics after OR now given this, will reconfirm with ID later after BKA and will likely need IP rehab to recover and will plan for PM and R consult after surgery  - Wound cultures --  MSSA and Group B strep from right ankle.   - Blood cultures -- MSSA on admit but repeat blood cultures negative.   - ID consulted and following and appreciate recs:  - Continue IV Oxacillin 12 g every 24 hours continuous infusion. Plan for orals if hardware retained once completed.  - Anticipate 6 weeks of IV antibiotics from date of most recent washout.  - Continue PT/OT and non weight bearing to right lower extremity as per Ortho. Continue wound vac as per Ortho to surgical site.  - Pain controlled and continue multimodals.   - Continue Lovenox 40 mg subcutaneous daily for DVT prophylaxis.     Airway malacia  Noted on CT scan of chest. Patient with no acute issues and incidentally noted on CT scan of chest. Patient asymptomatic.       PAD (peripheral artery disease)  Present on admit. Patient noted to have high grade stenosis on CTA of right leg. Vascular surgery consulted and patient taken to OR and had unilateral angiogram of right leg and had PT/AT artery stenosis on 9/17 and underwent PTA of right PT/AT arteries. Appreciate Vascular surgery assistance on case.   Concern with wound breakdown in last week and if free flap not successful on 9/26 then only other option may be bka and unfortunately waws not successful and will f/u ortho recs as now BKA is plan      Acute blood loss anemia  Received 2 U during plastic surgery flap attempt on 9/27 with stable hg post op  - Hgb fluctuating and own to 7.6 on 9/25 from 8.3 on 9/24. No clinical signs of bleeding and will monitor.   - Anemia is likely due to acute blood loss which was from surgery. Most recent hemoglobin and hematocrit are listed below.  Recent Labs     09/25/24  0233 09/26/24  0426 09/26/24  1444 09/26/24 2015 09/26/24  2133 09/27/24  0449   HGB 7.6* 7.8*  --   --   --  8.7*   HCT 25.0* 24.5*   < > 18* 22* 25.5*    < > = values in this interval not displayed.       Plan  - Monitor serial CBC: Daily  - Transfuse PRBC if patient becomes hemodynamically  unstable, symptomatic or H/H drops below 7/21.  - Patient has not received any PRBC transfusions to date  - Patient's anemia is currently stable and monitor. No indication for blood transfusion at this time.     Thrombocytosis  Improving with treatment of infection. Present on admit and related to infection causing increase in platelet count. Monitor daily CBC.       On pre-exposure prophylaxis for HIV  Patient on Truvada as outpatient but held in hospital due to elevated AST and ALT that has resolved. Plan to resume Truvada on discharge.     MSSA bacteremia  Bacteremia resolved.   - Blood cultures from admit grew MSSA. Repeat blood cultures done on 9/8 and 9/10 no growth.  - Infectious Disease consulted and patient placed on IV Oxacillin infusion as suspected source was right ankle surgical wound infection for bacteremia as grew MSSA from right ankle wound.   - Echo done without concern for vegetations.    Uncontrolled type 2 diabetes mellitus with hyperglycemia  Patient's FSGs controlled. Endocrine consulted and managing patient's diabetes in hospital and appreciate assistance. Patient on insulin pump at [facility] but not in hospital and on basal/prandial insulin in hospital as per Endocrine. Appreciate Endocrine assistance on this case.   Last A1c reviewed-   Lab Results   Component Value Date    LABA1C 10.8 (H) 08/15/2016    HGBA1C 8.5 (H) 09/06/2024     Most recent fingerstick glucose reviewed-   Recent Labs   Lab 09/24/24  1930 09/25/24  0733 09/25/24  1123 09/25/24  1542   POCTGLUCOSE 299* 260* 221* 107       Current correctional scale  Low  Maintain anti-hyperglycemic dose as follows-   Antihyperglycemics (From admission, onward)      Start     Stop Route Frequency Ordered    09/25/24 1000  insulin aspart U-100 pen 0-10 Units         -- SubQ Before meals & nightly PRN 09/25/24 0901    09/25/24 0900  insulin glargine U-100 (Lantus) pen 20 Units         -- SubQ Daily 09/25/24 0844    09/20/24 1130  insulin aspart  "U-100 pen 2-8 Units         -- SubQ 3 times daily with meals 09/20/24 0844           - Endocrine consulted:  - At this time, recommend that the patient remain off of his pump since he cannot be controlled in the Auto mode. Patient does not interact with pump frequently and relies on the auto mode algorithm.   - Insulin dosing as per Endocrine recommendations.   - Hold oral antihyperglycemics during hospitalization   - Monitor blood sugars with meals and at bedtime in hospital.  - Target blood sugars 140-180 in hospital.  - Diabetic diet.     Closed trimalleolar fracture of right ankle  S/p ORIF 07/2024  Ortho consulted   - see surgical wound breakdown above  NWB    Class 1 obesity due to excess calories with serious comorbidity and body mass index (BMI) of 34.0 to 34.9 in adult  Body mass index is 34.12 kg/m². Morbid obesity complicates all aspects of disease management from diagnostic modalities to treatment. Weight loss encouraged and health benefits explained to patient.         Gastroesophageal reflux disease without esophagitis  Controlled. Continue Carafate every 6 hours po to treat.       Metabolic acidosis  Bicarb 11 on 92/7 unclear reasons, will check lactic acid and resume na bicarb  Resolved. Discontinue sodium bicarbonate tablets on 9/22.   Related to DEEPALI. Patient started on sodium bicarbonate tablets and will continue. Monitor daily HCO3 levels with labs.       Uncontrolled type 2 diabetes mellitus with diabetic polyneuropathy, with long-term current use of insulin  Patient's FSGs are uncontrolled due to hyperglycemia on current medication regimen.  Last A1c reviewed-   Lab Results   Component Value Date    LABA1C 10.8 (H) 08/15/2016    HGBA1C 9.9 (H) 07/18/2024     Most recent fingerstick glucose reviewed- No results for input(s): "POCTGLUCOSE" in the last 24 hours.  Current correctional scale  Low  Maintain anti-hyperglycemic dose as follows-   Antihyperglycemics (From admission, onward)      Start    "  Stop Route Frequency Ordered    09/06/24 0506  insulin aspart U-100 pen 0-5 Units         -- SubQ Every 6 hours PRN 09/06/24 0406          Hold Oral hypoglycemics while patient is in the hospital.      VTE Risk Mitigation (From admission, onward)           Ordered     enoxaparin injection 40 mg  Daily         09/18/24 0808     IP VTE HIGH RISK PATIENT  Once         09/06/24 1735                    Discharge Planning   JAYLEEN: 10/3/2024     Code Status: Full Code   Is the patient medically ready for discharge?:     Reason for patient still in hospital (select all that apply): Patient trending condition  Discharge Plan A: Home Health                  Maria Del Rosario Medina MD  Department of Hospital Medicine   [facility] - Surgery

## 2024-09-27 NOTE — NURSING TRANSFER
Nursing Transfer Note      9/26/2024   12:16 PM    Nurse giving handoff:FRANCHESKA Pepper  Nurse receiving handoff:TATUM Patton    Reason patient is being transferred: s/p failed flap    Transfer To: [id number]    Transfer via bed    Transfer with 2L NC, cardiac monitoring    Transported by RN    Transfer Vital Signs:SEE FLOWSHEET    Telemetry: Box Number [id number]  Order for Tele Monitor? Yes    Additional Lines: Oxygen and Atkinson Catheter. Wound vac    Medicines sent: infusing     Any special needs or follow-up needed: routine    Chart send with patient: Yes    Notified: family    Patient reassessed at: 9/26/24 @0000

## 2024-09-27 NOTE — ASSESSMENT & PLAN NOTE
BG goal 140-180    - Lantus (Insulin Glargine) 20 units daily (20% increase due to fasting blood glucose above goal)  - Novolog 4-8 units TIDWM (Administer    4   units if patient eats 25-50% of meal, administer    8    units if patient eats > 50% of meal.)  - [facility] SSI (150/25)  - BG checks AC/HS  - Hypoglycemia protocol in place    ** Please notify Endocrine for any change and/or advance in diet**  ** Please call Endocrine for any BG related issues **    Discharge Planning:   TBD. Please notify endocrinology prior to discharge.

## 2024-09-27 NOTE — PROGRESS NOTES
[facility]  Orthopedics  Progress Note    Attg Note:  Patient seen and examined.  I agree with the resident's assessment and plan.  Attempt was made at free flap coverage of the patient's right ankle.  The vessels in that area are not sufficient.  At this point we are left with only below-knee amputation as an option.    Moe Liz MD      Patient Name: Cortes Schroeder  MRN: [id number]  Admission Date: 9/6/2024  Hospital Length of Stay: 21 days  Attending Provider: Maria Del Rosario Medina MD  Primary Care Provider: Andrew Sinclair Jr., MD  Follow-up For: Procedure(s) (LRB):  CREATION, FREE FLAP (Right)    Post-Operative Day: 1 Day Post-Op  Subjective:     Principal Problem:Surgical wound breakdown    Principal Orthopedic Problem: s/p I&D and wound vac placement on 09/06, 09/20     Interval History: POD1 after attempted free flap - no inflow at anastamosis. Patient understandably upset and grieving this morning but has very good insight and is looking forward to BKA and eventually leaving the hospital for recovery.        Review of patient's allergies indicates:   Allergen Reactions    Invokana [canagliflozin] Anaphylaxis    Percocet [oxycodone-acetaminophen] Nausea Only and Hallucinations    Biaxin [clarithromycin]     Hydrocodone Other (See Comments)     Dizzy/nausea/hallucinations    Sulfa (sulfonamide antibiotics) Nausea Only and Rash       Current Facility-Administered Medications   Medication    0.9%  NaCl infusion (for blood administration)    0.9%  NaCl infusion (for blood administration)    acetaminophen tablet 1,000 mg    albuterol-ipratropium 2.5 mg-0.5 mg/3 mL nebulizer solution 3 mL    amLODIPine tablet 10 mg    calcium carbonate 200 mg calcium (500 mg) chewable tablet 1,000 mg    dextrose 10% bolus 125 mL 125 mL    dextrose 10% bolus 125 mL 125 mL    dextrose 10% bolus 125 mL 125 mL    dextrose 10% bolus 250 mL 250 mL    dextrose 10% bolus 250 mL 250 mL    dextrose 10% bolus 250 mL 250 mL     "enoxaparin injection 40 mg    glucagon (human recombinant) injection 1 mg    glucagon (human recombinant) injection 1 mg    glucose chewable tablet 16 g    glucose chewable tablet 24 g    hydrALAZINE tablet 25 mg    insulin aspart U-100 pen 0-10 Units    insulin aspart U-100 pen 4-8 Units    insulin glargine U-100 (Lantus) pen 20 Units    lactated ringers infusion    losartan tablet 50 mg    methocarbamoL tablet 500 mg    morphine injection 2 mg    morphine injection 4 mg    nortriptyline capsule 50 mg    ondansetron disintegrating tablet 8 mg    oxacillin 12 g in  mL CONTINUOUS INFUSION    pantoprazole EC tablet 40 mg    polyethylene glycol packet 17 g    prochlorperazine injection Soln 5 mg    senna tablet 8.6 mg    sodium bicarbonate tablet 650 mg    sucralfate 100 mg/mL suspension 1 g    vitamin D 1000 units tablet 1,000 Units     Objective:     Vital Signs (Most Recent):  Temp: 98.2 °F (36.8 °C) (09/27/24 0714)  Pulse: 103 (09/27/24 0714)  Resp: 20 (09/27/24 0714)  BP: (!) 125/58 (09/27/24 0714)  SpO2: (!) 94 % (09/27/24 0714) Vital Signs (24h Range):  Temp:  [97.6 °F (36.4 °C)-98.2 °F (36.8 °C)] 98.2 °F (36.8 °C)  Pulse:  [] 103  Resp:  [15-26] 20  SpO2:  [94 %-100 %] 94 %  BP: (123-169)/(57-80) 125/58     Weight: 93 kg (205 lb 0.4 oz)  Height: 5' 5" (165.1 cm)  Body mass index is 34.12 kg/m².      Intake/Output Summary (Last 24 hours) at 9/27/2024 0951  Last data filed at 9/27/2024 0749  Gross per 24 hour   Intake 4524 ml   Output 1170 ml   Net 3354 ml          Ortho/SPM Exam     AAOx4  NAD  Tachycardic  No increased WOB    RLE  Medial wound vac to good suction  Lateral side with fibrinous exudate and weeping ecchymosis distally  Compartments soft/compressible  Sensation diminished (at baseline)   Active toe dorsiflexion & plantarflexion  WWP. Brisk cap refill      Significant Labs:     Recent Labs   Lab 09/27/24  0449   WBC 22.65*   RBC 2.97*   HGB 8.7*   HCT 25.5*      MCV 86   MCH 29.3 "   [facility] 34.1         Significant Imaging: I have reviewed and interpreted all pertinent imaging results/findings.    Assessment/Plan:     * Surgical wound breakdown  Cortes Schroeder is a 64 y.o. male with PMH of DM, HTN  and right trimalleolar ankle fracture status post ex fix on 07/18 with subsequent definitive fixation on 07/26 admitted with right ankle wound dehiscence in the setting of uncontrolled diabetes.      He is s/p I&D of R ankle 09/06. Repeat I&D R medial ankle 09/09. 9/17 angiography and medial ankle wound vac exchange.   To OR 9/20 and 9/23 for repeat I&D and vac exchange. Hardware removed on 9/23.   Free flap aborted 9/26 due to poor vascular inflow. Plan is for right BKA - will optimize patient and discuss timing with staff.     Pain control: multimodal regimen  PT/OT:  NWB RLE   DVT PPx: Lvx   Abx:  oxacillin continuous; ID following   Cultures:  ankle cx staph +    Dispo: pending           Artur Galvan MD  Orthopedics  [facility] - Surgery

## 2024-09-28 LAB
ALBUMIN SERPL BCP-MCNC: 1.8 G/DL (ref 3.5–5.2)
ALP SERPL-CCNC: 133 U/L (ref 55–135)
ALT SERPL W/O P-5'-P-CCNC: 6 U/L (ref 10–44)
ANION GAP SERPL CALC-SCNC: 10 MMOL/L (ref 8–16)
AST SERPL-CCNC: 16 U/L (ref 10–40)
BILIRUB SERPL-MCNC: 0.3 MG/DL (ref 0.1–1)
BUN SERPL-MCNC: 23 MG/DL (ref 8–23)
CALCIUM SERPL-MCNC: 7.9 MG/DL (ref 8.7–10.5)
CHLORIDE SERPL-SCNC: 108 MMOL/L (ref 95–110)
CO2 SERPL-SCNC: 18 MMOL/L (ref 23–29)
CREAT SERPL-MCNC: 1.3 MG/DL (ref 0.5–1.4)
ERYTHROCYTE [DISTWIDTH] IN BLOOD BY AUTOMATED COUNT: 17.9 % (ref 11.5–14.5)
EST. GFR  (NO RACE VARIABLE): >60 ML/MIN/1.73 M^2
GLUCOSE SERPL-MCNC: 106 MG/DL (ref 70–110)
HCT VFR BLD AUTO: 27.2 % (ref 40–54)
HGB BLD-MCNC: 9 G/DL (ref 14–18)
MCH RBC QN AUTO: 28.7 PG (ref 27–31)
MCHC RBC AUTO-ENTMCNC: 33.1 G/DL (ref 32–36)
MCV RBC AUTO: 87 FL (ref 82–98)
PLATELET # BLD AUTO: 400 K/UL (ref 150–450)
PMV BLD AUTO: 10.2 FL (ref 9.2–12.9)
POCT GLUCOSE: 165 MG/DL (ref 70–110)
POCT GLUCOSE: 169 MG/DL (ref 70–110)
POCT GLUCOSE: 189 MG/DL (ref 70–110)
POCT GLUCOSE: 77 MG/DL (ref 70–110)
POTASSIUM SERPL-SCNC: 3.7 MMOL/L (ref 3.5–5.1)
PROT SERPL-MCNC: 5.4 G/DL (ref 6–8.4)
RBC # BLD AUTO: 3.14 M/UL (ref 4.6–6.2)
SODIUM SERPL-SCNC: 136 MMOL/L (ref 136–145)
WBC # BLD AUTO: 15.37 K/UL (ref 3.9–12.7)

## 2024-09-28 PROCEDURE — 21400001 HC TELEMETRY ROOM

## 2024-09-28 PROCEDURE — 94799 UNLISTED PULMONARY SVC/PX: CPT

## 2024-09-28 PROCEDURE — 25000003 PHARM REV CODE 250: Performed by: STUDENT IN AN ORGANIZED HEALTH CARE EDUCATION/TRAINING PROGRAM

## 2024-09-28 PROCEDURE — 63600175 PHARM REV CODE 636 W HCPCS: Performed by: STUDENT IN AN ORGANIZED HEALTH CARE EDUCATION/TRAINING PROGRAM

## 2024-09-28 PROCEDURE — 25000003 PHARM REV CODE 250: Performed by: HOSPITALIST

## 2024-09-28 PROCEDURE — 99232 SBSQ HOSP IP/OBS MODERATE 35: CPT | Mod: ,,,

## 2024-09-28 PROCEDURE — 94664 DEMO&/EVAL PT USE INHALER: CPT

## 2024-09-28 PROCEDURE — 63600175 PHARM REV CODE 636 W HCPCS

## 2024-09-28 PROCEDURE — 80053 COMPREHEN METABOLIC PANEL: CPT | Performed by: HOSPITALIST

## 2024-09-28 PROCEDURE — 99900035 HC TECH TIME PER 15 MIN (STAT)

## 2024-09-28 PROCEDURE — 36415 COLL VENOUS BLD VENIPUNCTURE: CPT | Performed by: HOSPITALIST

## 2024-09-28 PROCEDURE — 85027 COMPLETE CBC AUTOMATED: CPT | Performed by: HOSPITALIST

## 2024-09-28 RX ORDER — LORAZEPAM 0.5 MG/1
0.5 TABLET ORAL EVERY 6 HOURS PRN
Status: DISCONTINUED | OUTPATIENT
Start: 2024-09-28 | End: 2024-10-04 | Stop reason: HOSPADM

## 2024-09-28 RX ORDER — HYDRALAZINE HYDROCHLORIDE 50 MG/1
50 TABLET, FILM COATED ORAL EVERY 8 HOURS PRN
Status: DISCONTINUED | OUTPATIENT
Start: 2024-09-28 | End: 2024-10-04 | Stop reason: HOSPADM

## 2024-09-28 RX ADMIN — SODIUM BICARBONATE 650 MG: 650 TABLET ORAL at 02:09

## 2024-09-28 RX ADMIN — INSULIN GLARGINE 20 UNITS: 100 INJECTION, SOLUTION SUBCUTANEOUS at 08:09

## 2024-09-28 RX ADMIN — LOSARTAN POTASSIUM 50 MG: 25 TABLET, FILM COATED ORAL at 08:09

## 2024-09-28 RX ADMIN — CHOLECALCIFEROL TAB 25 MCG (1000 UNIT) 1000 UNITS: 25 TAB at 08:09

## 2024-09-28 RX ADMIN — SUCRALFATE 1 G: 1 SUSPENSION ORAL at 05:09

## 2024-09-28 RX ADMIN — SODIUM BICARBONATE 650 MG: 650 TABLET ORAL at 09:09

## 2024-09-28 RX ADMIN — METHOCARBAMOL 500 MG: 500 TABLET ORAL at 09:09

## 2024-09-28 RX ADMIN — SUCRALFATE 1 G: 1 SUSPENSION ORAL at 11:09

## 2024-09-28 RX ADMIN — PANTOPRAZOLE SODIUM 40 MG: 40 TABLET, DELAYED RELEASE ORAL at 08:09

## 2024-09-28 RX ADMIN — SODIUM BICARBONATE 650 MG: 650 TABLET ORAL at 08:09

## 2024-09-28 RX ADMIN — INSULIN ASPART 4 UNITS: 100 INJECTION, SOLUTION INTRAVENOUS; SUBCUTANEOUS at 12:09

## 2024-09-28 RX ADMIN — ACETAMINOPHEN 1000 MG: 325 TABLET ORAL at 09:09

## 2024-09-28 RX ADMIN — ACETAMINOPHEN 1000 MG: 325 TABLET ORAL at 02:09

## 2024-09-28 RX ADMIN — INSULIN ASPART 2 UNITS: 100 INJECTION, SOLUTION INTRAVENOUS; SUBCUTANEOUS at 12:09

## 2024-09-28 RX ADMIN — INSULIN ASPART 4 UNITS: 100 INJECTION, SOLUTION INTRAVENOUS; SUBCUTANEOUS at 04:09

## 2024-09-28 RX ADMIN — NORTRIPTYLINE HYDROCHLORIDE 50 MG: 25 CAPSULE ORAL at 09:09

## 2024-09-28 RX ADMIN — CALCIUM CARBONATE (ANTACID) CHEW TAB 500 MG 1000 MG: 500 CHEW TAB at 08:09

## 2024-09-28 RX ADMIN — INSULIN ASPART 2 UNITS: 100 INJECTION, SOLUTION INTRAVENOUS; SUBCUTANEOUS at 04:09

## 2024-09-28 RX ADMIN — INSULIN ASPART 2 UNITS: 100 INJECTION, SOLUTION INTRAVENOUS; SUBCUTANEOUS at 08:09

## 2024-09-28 RX ADMIN — AMLODIPINE BESYLATE 10 MG: 10 TABLET ORAL at 08:09

## 2024-09-28 RX ADMIN — SUCRALFATE 1 G: 1 SUSPENSION ORAL at 06:09

## 2024-09-28 RX ADMIN — HYDRALAZINE HYDROCHLORIDE 50 MG: 50 TABLET ORAL at 08:09

## 2024-09-28 RX ADMIN — ENOXAPARIN SODIUM 40 MG: 40 INJECTION SUBCUTANEOUS at 04:09

## 2024-09-28 RX ADMIN — LORAZEPAM 0.5 MG: 0.5 TABLET ORAL at 10:09

## 2024-09-28 RX ADMIN — METHOCARBAMOL 500 MG: 500 TABLET ORAL at 08:09

## 2024-09-28 RX ADMIN — ACETAMINOPHEN 1000 MG: 325 TABLET ORAL at 06:09

## 2024-09-28 RX ADMIN — METHOCARBAMOL 500 MG: 500 TABLET ORAL at 02:09

## 2024-09-28 RX ADMIN — OXACILLIN 12 G: 2 INJECTION, POWDER, FOR SOLUTION INTRAMUSCULAR; INTRAVENOUS at 08:09

## 2024-09-28 NOTE — ASSESSMENT & PLAN NOTE
Closed trimalleolar fracture of right ankle s/p ORIF in 7/2024  Surgical site infection  65 yo male presenting with confusion and worsening redness, swelling and pain to right ankle. Patient with sepsis criteria on admit and right ankle wound felt to be source of infection.   - Ortho consulted and patient taken to OR 9/06/2024 for debridement of surgical wound with wound vac placed. Cultures taken and grew both MSSA and Group B streptococcus from wound. Patient taken back again to OR with Ortho with Dr. Liz and patient had another I&Dand wound vac change on 9/9/2024. Orthopedics took back to OR again on 9/20/2024 with Dr. Liz and underwent another I&D of right ankle and wound and replacement of wound vac. Patient taken back to OR with OR again on 9/23 with Dr. Liz and had another I&D and wound vac change with Orthopedics. Ortho noted anterolateral foot eschar developing. Distal lateral wound breakdown and ortho opened up majority of lateral wound and excised surrounding skin and subcutaneous tissue. Ortho removed all ankle hardware. Wound vacs placed medially and laterally.   - Orthopedics recommended Plastics evaluation for free flap and as part of evaluation recommended Vascular evaluation. Vascular evaluated patient and angiogram of right leg done and PTA done and revascularization of right PT/AT. Plastics plan propeller flap on 9/25/2024 but unfortunately due to presence of lateral and anterior wounds to right ankle no longer candidate for propeller flap so now will need free flap to cover both wounds. Plastics plans to take to OR on 9/26 for free flap to right ankle wounds. Plastics reports if unable to do free flap then patient's only option will be a BKA.  He understands and will f/u further 9/26 OR recs and was in OR for 8 hours with attemptes at targets at least 3 times but was not successful unfortunately and now will plan for BKA with ortho. Will likely not need any antibiotics after OR now  given this, will reconfirm with ID later after BKA and will likely need IP rehab to recover and will plan for PM and R consult after surgery  -OR scheduled for 10/1 with dr velez  - Wound cultures -- MSSA and Group B strep from right ankle.   - Blood cultures -- MSSA on admit but repeat blood cultures negative.   - ID consulted and following and appreciate recs:  - Continue IV Oxacillin 12 g every 24 hours continuous infusion. Plan for orals if hardware retained once completed.  - Anticipate 6 weeks of IV antibiotics from date of most recent washout.  - Continue PT/OT and non weight bearing to right lower extremity as per Ortho. Continue wound vac as per Ortho to surgical site.  - Pain controlled and continue multimodals.   - Continue Lovenox 40 mg subcutaneous daily for DVT prophylaxis.

## 2024-09-28 NOTE — PROGRESS NOTES
[facility]  Endocrinology  Progress Note    Admit Date: 2024     Reason for Consult: Management of T2DM, Hyperglycemia      Surgical Procedure and Date: S/P irrigation debridement of RLE on 2024    Diabetes diagnosis year:      Home Diabetes Medications:    Humalog via OmniPod insulin pump  Mounjaro 10 mg weekly     Current pump settings:  Basal 12 am to 2.3 and 6 am to 1.55  ICR to 3 at 12 am, 2 at 4 pm  ISF to 1:20   AIT 3 hrs  Target 110-120        Patient had anaphylaxis reaction to SGLT2 inhibitors specifically Invokana     How often checking glucose at home? Dexcom G6   BG readings on regimen: Average 210's per Dexcom  Hypoglycemia on the regimen?  Yes  Missed doses on regimen?  No     Diabetes Complications include:     Hyperglycemia and Diabetic peripheral neuropathy         Complicating diabetes co morbidities:   HLD, HTN, Obesity         HPI: 64 y.o. male presents to the ED w/ complaint of altered mental status. Hx of R ankle fracture with surgery over a month ago. Patient now presents with sepsis 2/2 R ankle infection s/p ORIF on . Started on IV vanc and cefepime. Given IVFs. Blood cultures pending. Brought to OR with ortho on  for irrigation debridement of RLE. Endocrine following for BG and type 2 diabetes.     Lab Results   Component Value Date    LABA1C 10.8 (H) 08/15/2016    HGBA1C 8.5 (H) 2024         Interval HPI:   No acute events overnight. Patient in room 542/542 A. Blood glucose variable. BG at and above goal on current insulin regimen (SSI, prandial, and basal insulin ). Steroid use- None. Of note, patient going down to cafeteria and eating non ADA foods without insulin coverage.     2 Days Post-Op  Renal function-   Lab Results   Component Value Date    CREATININE 1.3 2024        Vasopressors-  None     Diet diabetic  Calorie     Eatin%  Nausea: No  Hypoglycemia and intervention: No  Fever: No  TPN and/or TF: No    BP (!) 173/83 (BP  "Location: Right arm, Patient Position: Lying)   Pulse 100   Temp 97.8 °F (36.6 °C) (Tympanic)   Resp 20   Ht 5' 5" (1.651 m)   Wt 93 kg (205 lb 0.4 oz)   SpO2 95%   BMI 34.12 kg/m²     Labs Reviewed and Include    Recent Labs   Lab 09/28/24  0359      CALCIUM 7.9*   ALBUMIN 1.8*   PROT 5.4*      K 3.7   CO2 18*      BUN 23   CREATININE 1.3   ALKPHOS 133   ALT 6*   AST 16   BILITOT 0.3     Lab Results   Component Value Date    WBC 15.37 (H) 09/28/2024    HGB 9.0 (L) 09/28/2024    HCT 27.2 (L) 09/28/2024    MCV 87 09/28/2024     09/28/2024     No results for input(s): "TSH", "FREET4" in the last 168 hours.  Lab Results   Component Value Date    HGBA1C 8.5 (H) 09/06/2024       Nutritional status:   Body mass index is 34.12 kg/m².  Lab Results   Component Value Date    ALBUMIN 1.8 (L) 09/28/2024    ALBUMIN 1.8 (L) 09/27/2024    ALBUMIN 1.6 (L) 09/26/2024     Lab Results   Component Value Date    PREALBUMIN 3 (L) 09/06/2024    PREALBUMIN 13 (L) 07/18/2024       Estimated Creatinine Clearance: 61 mL/min (based on SCr of 1.3 mg/dL).    Accu-Checks  Recent Labs     09/26/24  0755 09/26/24  1128 09/26/24  1247 09/26/24  2245 09/27/24  0030 09/27/24  0726 09/27/24  1236 09/27/24  1541 09/27/24  2128 09/28/24  0646   POCTGLUCOSE 152* 173* 165* 208* 235* 280* 262* 322* 110 165*       Current Medications and/or Treatments Impacting Glycemic Control  Immunotherapy:    Immunosuppressants       None          Steroids:   Hormones (From admission, onward)      None          Pressors:    Autonomic Drugs (From admission, onward)      None          Hyperglycemia/Diabetes Medications:   Antihyperglycemics (From admission, onward)      Start     Stop Route Frequency Ordered    09/27/24 1013  insulin aspart U-100 pen 0-10 Units         -- SubQ Before meals & nightly PRN 09/27/24 0913    09/27/24 0900  insulin glargine U-100 (Lantus) pen 20 Units         -- SubQ Daily 09/27/24 0752    09/26/24 1645  insulin " aspart U-100 pen 4-8 Units         -- SubQ 3 times daily with meals 09/26/24 1527            ASSESSMENT and PLAN    Cardiac/Vascular  Hyperlipidemia  On statin per ADA guidelines       Endocrine  Uncontrolled type 2 diabetes mellitus with hyperglycemia  BG goal 140-180    - Lantus (Insulin Glargine) 20 units daily (20% increase due to fasting blood glucose above goal)  - Novolog 4-8 units TIDWM (Administer    4   units if patient eats 25-50% of meal, administer    8    units if patient eats > 50% of meal.)  - [facility] SSI (150/25)  - BG checks AC/HS  - Hypoglycemia protocol in place    ** Please notify Endocrine for any change and/or advance in diet**  ** Please call Endocrine for any BG related issues **    Discharge Planning:   TBD. Please notify endocrinology prior to discharge.        Orthopedic  * Surgical wound breakdown  Optimize BG control to improve wound healing  Managed per primary team              Payton Vora PA-C  Endocrinology  [facility]

## 2024-09-28 NOTE — ASSESSMENT & PLAN NOTE
Bicarb 11 on 92/7 unclear reasons, will check lactic acid and resume na bicarb  Resolved. Discontinue sodium bicarbonate tablets on 9/22.   Related to DEEPALI. Patient started on sodium bicarbonate tablets and will continue. Monitor daily HCO3 levels with labs.   Lactate never drawn but improved to 18 now. Will titrate down na bicarb pending trends 9/28

## 2024-09-28 NOTE — PROGRESS NOTES
[facility] Medicine  Progress Note    Patient Name: Cortes Schroeder  MRN: [id number]  Patient Class: IP- Inpatient   Admission Date: 9/6/2024  Length of Stay: 22 days  Attending Physician: Maria Del Rosario Medina MD  Primary Care Provider: Andrew Sinclair Jr., MD        Subjective:     Principal Problem:Surgical wound breakdown        HPI:  Cortes Schroeder is a 64 y.o. M with PMHx of anxiety, T2DM, GERD, HLD, HTN who presented to ED for AMS. He had a fall in July that resulted in R trimalleolar fracture and L distal radius fracture. He had external fixation on 07/18 and then ORIF on 07/26 with Dr. Liz. He was prescribed clinda 300 mg on 08/28 for a ten day course. Patient was doing well when he acutely became altered and not acting like himself a few hours ago. Patient family member drove him here from [facility] for ortho consult. R medial ankle wound dehisced with redness and swelling around it. No CPM fever, cough, N/V/D or seizure.    In ED: T max 99.1. SIRS 2/4 with WBC 18 and . CMP notable for Na 127, Cr 1.7, . Phos 1.9. .3. BNP 73. Trop neg. A1c 8.5. R tib fib XR notes There are 2 abandoned anterior-posteriorly oriented pin tracks in the right mid tibial shaft.  On AP views, each of these pin tracks demonstrates a small faint internal density, possibly representing a sequestrum. Ortho and endocrine consulted. Given IV vanc and IV clindamycin. Given 2.7L IVFs. Admitted to hospital medicine for sepsis 2/2 infected hardware.     Overview/Hospital Course:  Cortes Schroeder is a 65 y/o male admitted to hospital medicine for sepsis related to right ankle from surgical wound infection in patient with recent ORIF of right ankle fracture done on 7/26/2024. Patient started on empiric IV Vancomycin and IV Cefepime and given IVF's as per sepsis protocol. Orthopedics consulted and patient taken to OR on 9/6/2024 and had irrigation debridement of right surgical incision and  placement of wound vac.Endocrine consulted to assist in management of his diabetes while in hospital. Blood cultures from admit returned with MSSA. Infectious Disease consulted. Wound culture returned positive for Group B streptococcus and MSSA. Echo done due to bacteremia without concern for vegetations. ID changed antibiotics to IV Oxacillin. Patient with new cough and worsening WBC. CTA chest negative for PE but notes small area of ground glass changes to RUL. Patient placed on 5 day course of po Doxycycline to treat as per ID and completed for possible pneumonia. Patient taken back to OR on 09/09/2024 with Ortho and had another irrigation and debridement and replacement of wound vac to right ankle. Plastics consulted for flap evaluation and recommended vascular evaluation. CTA right lower extremity done and showed moderate atherosclerosis and multifocal high grade stenosis throughout right calf arteries. Dietary consulted for malnutrition and started on Boost supplementation to maximize his nutrition for a flap and wound healing. Vascular surgery consulted per Plastics recs as they would like to pre-optimize blood flow prior to any free flap or pedicled propellar flap. Vascular recommended angiogram of right leg but patient developed DEEPALI. Nephrology consulted and suspected ATN related to contrast nephropathy. Patient placed on supportive care. DEEPALI resolved. Patyoanetn taken for unilateral angiogram by Vascular on 9/17 and underwent PTA of right posterior tibial artery and right anterior tibial artery. After revascularization of right leg pl;an to do flap but working on timing with Ortho and Plastics. Patient to go back to OR again on 9/20 for another irrigation and debridement and replacement of wound vac to right ankle wound by Ortho. Plastics plans propeller flap to right ankle on 9/25 and then will need to remain in hospital for 1 week after flap for dangling protocol and monitoring of flap per Plastics. Repeat  blood cultures from 9/8 and 9/10 no growth. Patient taken back to OR on 9/20 with Dr. Moe Liz and had another I&D of right ankle and wound and replacement of wound vac. Patient to remain non weight bearing post-op and Plastics plans flap placement while in hospital on 9/25. Patient to go back to OR on for another I&D and wound vac change on 9/23. Patient taken back to OR on 9/23 with Dr. Liz and had another I&D and wound vac change with Orthopedics. Ortho noted anterolateral foot eschar developing. Distal lateral wound breakdown and ortho opened up majority of lateral wound and excised surrounding skin and subcutaneous tissue. Ortho removed all ankle hardware. Wound vacs placed medially and laterally. Patient re-evaluated by Plastics on 9/25 and state due to lateral wound in addition to anterior wound no longer a candidate for propellar flap and plastic will need to do free flap to cover both wounds. Plastics concerned if no adequate vascular targets, will be unable to cover and BKA is only option. Patient not very happy. Patient to go to OR On 9/26 for free flap.     Interval History: feeling okay this morning, still wrapping his head around the fact he has to have a BKA which is hard to do but he's working on it. His roommate hugo has been very helpful as he's been through this and yesterday visited and took him around the lobby and got him out of room which helped mental health given has been here >20 days and starting to take its toll with monotony. Labs stbale, bicarb low yesterday for unclear reasons and na bicarb started and improved now to 18. Hg stable, wbc ticked up yesterday likely reactive from very long surgery and improvign now 22-15. He reports he talked to one of surgery treams today who report likely would need to cont wound vac for donor site on abd/pelvic area as drains also still in place but that vanc on LE not expected after BKA and just ace bandages. Discussed typical post op  process with fitting for prosthetics in a few weeks once incisions healed and he is aware of general process given his roommate had this last year with [employer] himself. We talked about some of the projects they've been doing before this, he was renovating his home and had put in new appliances and got a good deal at a place in [place] called [employer] for refrigerator and also replaced his burners, , washer/dryer. He did a bathroom renovation and now is going to fully complete it to make a walk in [employer] for once he's able to use after healed from surgery as has a large walk in shower area and teak paneling and this will be convenient long term with recovery.      Review of Systems   Constitutional:  Negative for fever.   Respiratory:  Negative for shortness of breath.    Cardiovascular:  Negative for chest pain.   Gastrointestinal:  Negative for nausea and vomiting.   Musculoskeletal:  Positive for arthralgias (Right ankle).   Psychiatric/Behavioral:  Negative for confusion.      Objective:     Vital Signs (Most Recent):  Temp: 97.5 °F (36.4 °C) (09/25/24 1540)  Pulse: 79 (09/25/24 1540)  Resp: 19 (09/25/24 1540)  BP: 158/76 (09/25/24 1540)  SpO2: 96 % (09/25/24 1540) on room air Vital Signs (24h Range):  Temp:  [97.7 °F (36.5 °C)-98.5 °F (36.9 °C)] 97.8 °F (36.6 °C)  Pulse:  [] 100  Resp:  [15-20] 20  SpO2:  [94 %-96 %] 95 %  BP: (120-173)/(56-83) 173/83     Weight: 93 kg (205 lb 0.4 oz)  Body mass index is 34.12 kg/m².    Intake/Output Summary (Last 24 hours) at 9/28/2024 1040  Last data filed at 9/28/2024 0850  Gross per 24 hour   Intake --   Output 2110 ml   Net -2110 ml         Physical Exam  Vitals and nursing note reviewed.   Constitutional:       General: He is awake. He is not in acute distress.     Appearance: Normal appearance. He is well-developed. He is obese.      Comments: Patient sitting up in bed and spirits improving   Eyes:      Conjunctiva/sclera: Conjunctivae normal.    Cardiovascular:      Rate and Rhythm: Normal rate and regular rhythm.      Heart sounds: Normal heart sounds. No murmur heard.  Pulmonary:      Effort: Pulmonary effort is normal. No respiratory distress.      Breath sounds: Normal breath sounds. No wheezing.   Abdominal:      General: Abdomen is flat. Bowel sounds are normal. There is no distension.      Palpations: Abdomen is soft.      Tenderness: There is no abdominal tenderness.      Comments: Vac to groin/flank and 2 drains post flap attempt donation   Musculoskeletal:      Left lower leg: No edema.      Comments: Wound vac in place to right ankle and ACE bandage overlying wound vac.    Skin:     General: Skin is warm.      Findings: No erythema.   Neurological:      Mental Status: He is alert and oriented to person, place, and time.   Psychiatric:         Mood and Affect: Mood normal.         Behavior: Behavior normal. Behavior is cooperative.         Thought Content: Thought content normal.         Judgment: Judgment normal.             Significant Labs: CBC:   Recent Labs   Lab 09/26/24  2133 09/27/24  0449 09/28/24  0359   WBC  --  22.65* 15.37*   HGB  --  8.7* 9.0*   HCT 22* 25.5* 27.2*   PLT  --  417 400     CMP:   Recent Labs   Lab 09/27/24  0449 09/28/24  0359    136   K 5.3* 3.7    108   CO2 11* 18*   * 106   BUN 24* 23   CREATININE 1.3 1.3   CALCIUM 8.0* 7.9*   PROT 5.2* 5.4*   ALBUMIN 1.8* 1.8*   BILITOT 0.7 0.3   ALKPHOS 133 133   AST 16 16   ALT 5* 6*   ANIONGAP 19* 10     Blood Sugars (AccuCheck):    Recent Labs     09/27/24  0030 09/27/24  0726 09/27/24  1236 09/27/24  1541 09/27/24  2128 09/28/24  0646   POCTGLUCOSE 235* 280* 262* 322* 110 165*       Significant Imaging: I have reviewed all pertinent imaging results/findings within the past 24 hours.    Assessment/Plan:      * Surgical wound breakdown  Closed trimalleolar fracture of right ankle s/p ORIF in 7/2024  Surgical site infection  65 yo male presenting with  confusion and worsening redness, swelling and pain to right ankle. Patient with sepsis criteria on admit and right ankle wound felt to be source of infection.   - Ortho consulted and patient taken to OR 9/06/2024 for debridement of surgical wound with wound vac placed. Cultures taken and grew both MSSA and Group B streptococcus from wound. Patient taken back again to OR with Ortho with Dr. Liz and patient had another I&Dand wound vac change on 9/9/2024. Orthopedics took back to OR again on 9/20/2024 with Dr. Liz and underwent another I&D of right ankle and wound and replacement of wound vac. Patient taken back to OR with OR again on 9/23 with Dr. Liz and had another I&D and wound vac change with Orthopedics. Ortho noted anterolateral foot eschar developing. Distal lateral wound breakdown and ortho opened up majority of lateral wound and excised surrounding skin and subcutaneous tissue. Ortho removed all ankle hardware. Wound vacs placed medially and laterally.   - Orthopedics recommended Plastics evaluation for free flap and as part of evaluation recommended Vascular evaluation. Vascular evaluated patient and angiogram of right leg done and PTA done and revascularization of right PT/AT. Plastics plan propeller flap on 9/25/2024 but unfortunately due to presence of lateral and anterior wounds to right ankle no longer candidate for propeller flap so now will need free flap to cover both wounds. Plastics plans to take to OR on 9/26 for free flap to right ankle wounds. Plastics reports if unable to do free flap then patient's only option will be a BKA.  He understands and will f/u further 9/26 OR recs and was in OR for 8 hours with attemptes at targets at least 3 times but was not successful unfortunately and now will plan for BKA with ortho. Will likely not need any antibiotics after OR now given this, will reconfirm with ID later after BKA and will likely need IP rehab to recover and will plan for PM and R  consult after surgery  -OR scheduled for 10/1 with dr velez  - Wound cultures -- MSSA and Group B strep from right ankle.   - Blood cultures -- MSSA on admit but repeat blood cultures negative.   - ID consulted and following and appreciate recs:  - Continue IV Oxacillin 12 g every 24 hours continuous infusion. Plan for orals if hardware retained once completed.  - Anticipate 6 weeks of IV antibiotics from date of most recent washout.  - Continue PT/OT and non weight bearing to right lower extremity as per Ortho. Continue wound vac as per Ortho to surgical site.  - Pain controlled and continue multimodals.   - Continue Lovenox 40 mg subcutaneous daily for DVT prophylaxis.     Airway malacia  Noted on CT scan of chest. Patient with no acute issues and incidentally noted on CT scan of chest. Patient asymptomatic.       PAD (peripheral artery disease)  Present on admit. Patient noted to have high grade stenosis on CTA of right leg. Vascular surgery consulted and patient taken to OR and had unilateral angiogram of right leg and had PT/AT artery stenosis on 9/17 and underwent PTA of right PT/AT arteries. Appreciate Vascular surgery assistance on case.   Concern with wound breakdown in last week and if free flap not successful on 9/26 then only other option may be bka and unfortunately waws not successful and will f/u ortho recs as now BKA is plan  -s/p free flap attempt with plastics and did attempt donor flap from abdomen/flank area and so has 2 drains from this area post op in place and vac as well from donor site even though did not take, and still in place 9/28 and plastics managing      Acute blood loss anemia  Received 2 U during plastic surgery flap attempt on 9/27 with stable hg post op  - Hgb fluctuating and own to 7.6 on 9/25 from 8.3 on 9/24. No clinical signs of bleeding and will monitor.   - Anemia is likely due to acute blood loss which was from surgery. Most recent hemoglobin and hematocrit are listed  below.  Recent Labs     09/25/24  0233 09/26/24  0426 09/26/24  1444 09/26/24 2015 09/26/24  2133 09/27/24  0449   HGB 7.6* 7.8*  --   --   --  8.7*   HCT 25.0* 24.5*   < > 18* 22* 25.5*    < > = values in this interval not displayed.       Plan  - Monitor serial CBC: Daily  - Transfuse PRBC if patient becomes hemodynamically unstable, symptomatic or H/H drops below 7/21.  - Patient has not received any PRBC transfusions to date  - Patient's anemia is currently stable and monitor. No indication for blood transfusion at this time.     Thrombocytosis  Improving with treatment of infection. Present on admit and related to infection causing increase in platelet count. Monitor daily CBC.       On pre-exposure prophylaxis for HIV  Patient on Truvada as outpatient but held in hospital due to elevated AST and ALT that has resolved. Plan to resume Truvada on discharge.     MSSA bacteremia  Bacteremia resolved.   - Blood cultures from admit grew MSSA. Repeat blood cultures done on 9/8 and 9/10 no growth.  - Infectious Disease consulted and patient placed on IV Oxacillin infusion as suspected source was right ankle surgical wound infection for bacteremia as grew MSSA from right ankle wound.   - Echo done without concern for vegetations.    Uncontrolled type 2 diabetes mellitus with hyperglycemia  Patient's FSGs controlled. Endocrine consulted and managing patient's diabetes in hospital and appreciate assistance. Patient on insulin pump at [facility] but not in hospital and on basal/prandial insulin in hospital as per Endocrine. Appreciate Endocrine assistance on this case.   Last A1c reviewed-   Lab Results   Component Value Date    LABA1C 10.8 (H) 08/15/2016    HGBA1C 8.5 (H) 09/06/2024     Most recent fingerstick glucose reviewed-   Recent Labs   Lab 09/24/24  1930 09/25/24  0733 09/25/24  1123 09/25/24  1542   POCTGLUCOSE 299* 260* 221* 107       Current correctional scale  Low  Maintain anti-hyperglycemic dose as follows-    Antihyperglycemics (From admission, onward)      Start     Stop Route Frequency Ordered    09/25/24 1000  insulin aspart U-100 pen 0-10 Units         -- SubQ Before meals & nightly PRN 09/25/24 0901    09/25/24 0900  insulin glargine U-100 (Lantus) pen 20 Units         -- SubQ Daily 09/25/24 0844    09/20/24 1130  insulin aspart U-100 pen 2-8 Units         -- SubQ 3 times daily with meals 09/20/24 0844           - Endocrine consulted:  - At this time, recommend that the patient remain off of his pump since he cannot be controlled in the Auto mode. Patient does not interact with pump frequently and relies on the auto mode algorithm.   - Insulin dosing as per Endocrine recommendations.   - Hold oral antihyperglycemics during hospitalization   - Monitor blood sugars with meals and at bedtime in hospital.  - Target blood sugars 140-180 in hospital.  - Diabetic diet.     Closed trimalleolar fracture of right ankle  S/p ORIF 07/2024  Ortho consulted   - see surgical wound breakdown above  NWB    Class 1 obesity due to excess calories with serious comorbidity and body mass index (BMI) of 34.0 to 34.9 in adult  Body mass index is 34.12 kg/m². Morbid obesity complicates all aspects of disease management from diagnostic modalities to treatment. Weight loss encouraged and health benefits explained to patient.         Gastroesophageal reflux disease without esophagitis  Controlled. Continue Carafate every 6 hours po to treat.       Metabolic acidosis  Bicarb 11 on 92/7 unclear reasons, will check lactic acid and resume na bicarb  Resolved. Discontinue sodium bicarbonate tablets on 9/22.   Related to DEEPALI. Patient started on sodium bicarbonate tablets and will continue. Monitor daily HCO3 levels with labs.   Lactate never drawn but improved to 18 now. Will titrate down na bicarb pending trends 9/28      Uncontrolled type 2 diabetes mellitus with diabetic polyneuropathy, with long-term current use of insulin  Patient's FSGs are  "uncontrolled due to hyperglycemia on current medication regimen.  Last A1c reviewed-   Lab Results   Component Value Date    LABA1C 10.8 (H) 08/15/2016    HGBA1C 9.9 (H) 07/18/2024     Most recent fingerstick glucose reviewed- No results for input(s): "POCTGLUCOSE" in the last 24 hours.  Current correctional scale  Low  Maintain anti-hyperglycemic dose as follows-   Antihyperglycemics (From admission, onward)      Start     Stop Route Frequency Ordered    09/06/24 0506  insulin aspart U-100 pen 0-5 Units         -- SubQ Every 6 hours PRN 09/06/24 0406          Hold Oral hypoglycemics while patient is in the hospital.      VTE Risk Mitigation (From admission, onward)           Ordered     enoxaparin injection 40 mg  Daily         09/18/24 0808     IP VTE HIGH RISK PATIENT  Once         09/06/24 1735                    Discharge Planning   JAYLEEN: 10/3/2024     Code Status: Full Code   Is the patient medically ready for discharge?:     Reason for patient still in hospital (select all that apply): Patient trending condition  Discharge Plan A: Home Health                  Maria Del Rosario Medina MD  Department of Hospital Medicine   [facility] - Surgery    "

## 2024-09-28 NOTE — NURSING
Nurses Note -- 4 Eyes      9/28/2024   3:18 AM      Skin assessed during: Q Shift Change      [x] No Altered Skin Integrity Present    []Prevention Measures Documented      [] Yes- Altered Skin Integrity Present or Discovered   [] LDA Added if Not in Epic (Describe Wound)   [] New Altered Skin Integrity was Present on Admit and Documented in LDA   [] Wound Image Taken    Wound Care Consulted? No    Attending Nurse:  Patito Hoffmann RN/Staff Member:  FRANCHESKA Bill

## 2024-09-28 NOTE — SUBJECTIVE & OBJECTIVE
"Interval HPI:   No acute events overnight. Patient in room 542/542 A. Blood glucose variable. BG at and above goal on current insulin regimen (SSI, prandial, and basal insulin ). Steroid use- None. Of note, patient going down to cafeteria and eating non ADA foods without insulin coverage.     2 Days Post-Op  Renal function-   Lab Results   Component Value Date    CREATININE 1.3 2024        Vasopressors-  None     Diet diabetic  Calorie     Eatin%  Nausea: No  Hypoglycemia and intervention: No  Fever: No  TPN and/or TF: No    BP (!) 173/83 (BP Location: Right arm, Patient Position: Lying)   Pulse 100   Temp 97.8 °F (36.6 °C) (Tympanic)   Resp 20   Ht 5' 5" (1.651 m)   Wt 93 kg (205 lb 0.4 oz)   SpO2 95%   BMI 34.12 kg/m²     Labs Reviewed and Include    Recent Labs   Lab 24  0359      CALCIUM 7.9*   ALBUMIN 1.8*   PROT 5.4*      K 3.7   CO2 18*      BUN 23   CREATININE 1.3   ALKPHOS 133   ALT 6*   AST 16   BILITOT 0.3     Lab Results   Component Value Date    WBC 15.37 (H) 2024    HGB 9.0 (L) 2024    HCT 27.2 (L) 2024    MCV 87 2024     2024     No results for input(s): "TSH", "FREET4" in the last 168 hours.  Lab Results   Component Value Date    HGBA1C 8.5 (H) 2024       Nutritional status:   Body mass index is 34.12 kg/m².  Lab Results   Component Value Date    ALBUMIN 1.8 (L) 2024    ALBUMIN 1.8 (L) 2024    ALBUMIN 1.6 (L) 2024     Lab Results   Component Value Date    PREALBUMIN 3 (L) 2024    PREALBUMIN 13 (L) 2024       Estimated Creatinine Clearance: 61 mL/min (based on SCr of 1.3 mg/dL).    Accu-Checks  Recent Labs     24  0755 24  1128 24  1247 24  2245 24  0030 24  0726 24  1236 24  1541 24  2128 24  0646   POCTGLUCOSE 152* 173* 165* 208* 235* 280* 262* 322* 110 165*       Current Medications and/or Treatments Impacting Glycemic " Control  Immunotherapy:    Immunosuppressants       None          Steroids:   Hormones (From admission, onward)      None          Pressors:    Autonomic Drugs (From admission, onward)      None          Hyperglycemia/Diabetes Medications:   Antihyperglycemics (From admission, onward)      Start     Stop Route Frequency Ordered    09/27/24 1013  insulin aspart U-100 pen 0-10 Units         -- SubQ Before meals & nightly PRN 09/27/24 0913    09/27/24 0900  insulin glargine U-100 (Lantus) pen 20 Units         -- SubQ Daily 09/27/24 0752    09/26/24 1645  insulin aspart U-100 pen 4-8 Units         -- SubQ 3 times daily with meals 09/26/24 4787

## 2024-09-28 NOTE — SUBJECTIVE & OBJECTIVE
Principal Problem:Surgical wound breakdown    Principal Orthopedic Problem: s/p I&D and wound vac placement on 09/06, 09/20     Interval History: Pain well controlled. Had some anxiety overnight. Plan for BKA right this week.       Review of patient's allergies indicates:   Allergen Reactions    Invokana [canagliflozin] Anaphylaxis    Percocet [oxycodone-acetaminophen] Nausea Only and Hallucinations    Biaxin [clarithromycin]     Hydrocodone Other (See Comments)     Dizzy/nausea/hallucinations    Sulfa (sulfonamide antibiotics) Nausea Only and Rash       Current Facility-Administered Medications   Medication    0.9%  NaCl infusion (for blood administration)    0.9%  NaCl infusion (for blood administration)    acetaminophen tablet 1,000 mg    albuterol-ipratropium 2.5 mg-0.5 mg/3 mL nebulizer solution 3 mL    amLODIPine tablet 10 mg    calcium carbonate 200 mg calcium (500 mg) chewable tablet 1,000 mg    dextrose 10% bolus 125 mL 125 mL    dextrose 10% bolus 125 mL 125 mL    dextrose 10% bolus 125 mL 125 mL    dextrose 10% bolus 250 mL 250 mL    dextrose 10% bolus 250 mL 250 mL    dextrose 10% bolus 250 mL 250 mL    enoxaparin injection 40 mg    glucagon (human recombinant) injection 1 mg    glucagon (human recombinant) injection 1 mg    glucose chewable tablet 16 g    glucose chewable tablet 24 g    hydrALAZINE tablet 50 mg    insulin aspart U-100 pen 0-10 Units    insulin aspart U-100 pen 4-8 Units    insulin glargine U-100 (Lantus) pen 20 Units    lactated ringers infusion    losartan tablet 50 mg    methocarbamoL tablet 500 mg    morphine injection 2 mg    morphine injection 4 mg    nortriptyline capsule 50 mg    ondansetron disintegrating tablet 8 mg    oxacillin 12 g in  mL CONTINUOUS INFUSION    pantoprazole EC tablet 40 mg    polyethylene glycol packet 17 g    prochlorperazine injection Soln 5 mg    senna tablet 8.6 mg    sodium bicarbonate tablet 650 mg    sucralfate 100 mg/mL suspension 1 g    vitamin  "D 1000 units tablet 1,000 Units     Objective:     Vital Signs (Most Recent):  Temp: 97.8 °F (36.6 °C) (09/28/24 0704)  Pulse: 100 (09/28/24 0704)  Resp: 20 (09/28/24 0704)  BP: (!) 173/83 (09/28/24 0704)  SpO2: 95 % (09/28/24 0704) Vital Signs (24h Range):  Temp:  [97.7 °F (36.5 °C)-98.5 °F (36.9 °C)] 97.8 °F (36.6 °C)  Pulse:  [] 100  Resp:  [15-20] 20  SpO2:  [94 %-96 %] 95 %  BP: (120-173)/(56-83) 173/83     Weight: 93 kg (205 lb 0.4 oz)  Height: 5' 5" (165.1 cm)  Body mass index is 34.12 kg/m².      Intake/Output Summary (Last 24 hours) at 9/28/2024 0717  Last data filed at 9/28/2024 0645  Gross per 24 hour   Intake --   Output 1950 ml   Net -1950 ml          Ortho/SPM Exam     AAOx4  NAD  Tachycardic  No increased WOB    RLE  Medial wound vac to good suction  Lateral side with fibrinous exudate and weeping ecchymosis distally  Compartments soft/compressible  Sensation diminished (at baseline)   Active toe dorsiflexion & plantarflexion  WWP. Brisk cap refill      Significant Labs:     Recent Labs   Lab 09/28/24  0359   WBC 15.37*   RBC 3.14*   HGB 9.0*   HCT 27.2*      MCV 87   MCH 28.7   MCHC 33.1         Significant Imaging: I have reviewed and interpreted all pertinent imaging results/findings.    "

## 2024-09-28 NOTE — PROGRESS NOTES
Plastic and Reconstructive Surgery   Progress Note    Subjective:    No issues  Wound vac with low battery being replaced. Seal adequate.    Objective:  Vital signs in last 24 hours:  Temp:  [97.7 °F (36.5 °C)-98.5 °F (36.9 °C)] 97.8 °F (36.6 °C)  Pulse:  [] 100  Resp:  [15-20] 20  SpO2:  [94 %-96 %] 95 %  BP: (120-173)/(56-83) 173/83    Intake/Output last 3 shifts:  I/O last 3 completed shifts:  In: 2674 [P.O.:350; Blood:324; IV Piggyback:2000]  Out: 2360 [Urine:1720; Drains:365; Other:275]    Intake/Output this shift:  I/O this shift:  In: -   Out: 430 [Urine:400; Drains:30]        Physical Exam:  VITAL SIGNS:   Vitals:    24 0007 24 0251 24 0428 24 0704   BP: (!) 151/70  (!) 143/69 (!) 173/83   BP Location: Right arm  Right arm Right arm   Patient Position: Lying  Lying Lying   Pulse: 91 88 95 100   Resp: 15  17 20   Temp: 97.7 °F (36.5 °C)  98 °F (36.7 °C) 97.8 °F (36.6 °C)   TempSrc: Oral  Oral Tympanic   SpO2: 95%  96% 95%   Weight:       Height:         TMAX: Temp (24hrs), Av.1 °F (36.7 °C), Min:97.7 °F (36.5 °C), Max:98.5 °F (36.9 °C)      General: Alert; No acute distress  Cardiovascular: Regular rate   Respiratory: Normal respiratory effort. Chest rise symmetric.   Abdomen: Soft, nontender, nondistended  Extremity:  RLE vac in place , able to wiggle toes, sensation intact, splint in place, Wound vac - serosang  Incisional vac on back incision, adequate suction, minimal output, NATALEE x2 serosang, no hematoma,     Neurologic: No focal deficit. Speech normal      Scheduled Medications acetaminophen, 1,000 mg, Q8H  amLODIPine, 10 mg, Daily  enoxaparin, 40 mg, Daily  insulin aspart U-100, 4-8 Units, TIDWM  insulin glargine U-100, 20 Units, Daily  losartan, 50 mg, Daily  nortriptyline, 50 mg, Nightly  oxacillin 12 g in  mL CONTINUOUS INFUSION, 12 g, Q24H  pantoprazole, 40 mg, Daily  polyethylene glycol, 17 g, BID  senna, 8.6 mg, BID  sodium bicarbonate, 650 mg,  TID  sucralfate, 1 g, Q6H  vitamin D, 1,000 Units, Daily        PRN Medications     Current Facility-Administered Medications:     0.9%  NaCl infusion (for blood administration), , Intravenous, Q24H PRN    0.9%  NaCl infusion (for blood administration), , Intravenous, Q24H PRN    albuterol-ipratropium, 3 mL, Nebulization, Q4H PRN    calcium carbonate, 1,000 mg, Oral, TID PRN    dextrose 10%, 12.5 g, Intravenous, PRN    dextrose 10%, 12.5 g, Intravenous, PRN    dextrose 10%, 12.5 g, Intravenous, PRN    dextrose 10%, 25 g, Intravenous, PRN    dextrose 10%, 25 g, Intravenous, PRN    dextrose 10%, 25 g, Intravenous, PRN    glucagon (human recombinant), 1 mg, Intramuscular, PRN    glucagon (human recombinant), 1 mg, Intramuscular, PRN    glucose, 16 g, Oral, PRN    glucose, 24 g, Oral, PRN    hydrALAZINE, 50 mg, Oral, Q8H PRN    insulin aspart U-100, 0-10 Units, Subcutaneous, QID (AC + HS) PRN    LORazepam, 0.5 mg, Oral, Q6H PRN    methocarbamoL, 500 mg, Oral, TID PRN    morphine, 2 mg, Intravenous, Q6H PRN    morphine, 4 mg, Intravenous, Q6H PRN    ondansetron, 8 mg, Oral, Q8H PRN    prochlorperazine, 5 mg, Intravenous, Q30 Min PRN    Recent Labs:   Lab Results   Component Value Date    WBC 15.37 (H) 09/28/2024    HGB 9.0 (L) 09/28/2024    HCT 27.2 (L) 09/28/2024    MCV 87 09/28/2024     09/28/2024     Lab Results   Component Value Date     09/28/2024     09/28/2024    K 3.7 09/28/2024     09/28/2024    BUN 23 09/28/2024         Assessment:   64 y.o. y/o male s/p Procedure(s):  REPLACEMENT, WOUND VAC; white & black sponge  REMOVAL, HARDWARE, ANKLE      2 Days Post-Op     64 y.o.male W/ right ankle post-surgical medial wound dehiscence and exposed hardware.      Now s/p PTA RLE PT/AT, completion angiogram showing 3 vessel run off, also washout and vac exchange    S/p Attempted Parascapular flap- however calcified PT and poor inflow     Plan  - Drains: NATALEE x2 ,Please do not remove plastic surgery  drains, incisional wound vac also in place  - RLE wound vac replaced  - Reg diet  - Abx: per ID  - DVT ppx, IS, PT/OT  - Appreciate Ortho recs for bka next week    Kristian Silva MD   Plastic Surgery Fellow  9/28/2024

## 2024-09-28 NOTE — ASSESSMENT & PLAN NOTE
Cortes Schroeder is a 64 y.o. male with PMH of DM, HTN  and right trimalleolar ankle fracture status post ex fix on 07/18 with subsequent definitive fixation on 07/26 admitted with right ankle wound dehiscence in the setting of uncontrolled diabetes.      He is s/p I&D of R ankle 09/06. Repeat I&D R medial ankle 09/09. 9/17 angiography and medial ankle wound vac exchange.   To OR 9/20 and 9/23 for repeat I&D and vac exchange. Hardware removed on 9/23.   Free flap aborted 9/26 due to poor vascular inflow. Plan is for right BKA - will optimize patient and discuss timing with staff.     Pain control: multimodal regimen  PT/OT:  NWB RLE   DVT PPx: Lvx   Abx:  oxacillin continuous; ID following   Cultures:  ankle cx staph +    Dispo: plan for BKA likely next week

## 2024-09-28 NOTE — ASSESSMENT & PLAN NOTE
Present on admit. Patient noted to have high grade stenosis on CTA of right leg. Vascular surgery consulted and patient taken to OR and had unilateral angiogram of right leg and had PT/AT artery stenosis on 9/17 and underwent PTA of right PT/AT arteries. Appreciate Vascular surgery assistance on case.   Concern with wound breakdown in last week and if free flap not successful on 9/26 then only other option may be bka and unfortunately waws not successful and will f/u ortho recs as now BKA is plan  -s/p free flap attempt with plastics and did attempt donor flap from abdomen/flank area and so has 2 drains from this area post op in place and vac as well from donor site even though did not take, and still in place 9/28 and plastics managing

## 2024-09-28 NOTE — ASSESSMENT & PLAN NOTE
BG goal 140-180    - Lantus (Insulin Glargine) 20 units daily (20% increase due to fasting blood glucose above goal)  - Novolog 4-8 units TIDWM (Administer    4   units if patient eats 25-50% of meal, administer    8    units if patient eats > 50% of meal.)  - [facility] (150/25)  - BG checks AC/HS  - Hypoglycemia protocol in place    ** Please notify Endocrine for any change and/or advance in diet**  ** Please call Endocrine for any BG related issues **    Discharge Planning:   TBD. Please notify endocrinology prior to discharge.

## 2024-09-28 NOTE — SUBJECTIVE & OBJECTIVE
Interval History: feeling okay this morning, still wrapping his head around the fact he has to have a BKA which is hard to do but he's working on it. His roommate hugo has been very helpful as he's been through this and yesterday visited and took him around the lobby and got him out of room which helped mental health given has been here >20 days and starting to take its toll with monotony. Labs stbale, bicarb low yesterday for unclear reasons and na bicarb started and improved now to 18. Hg stable, wbc ticked up yesterday likely reactive from very long surgery and improvign now 22-15. He reports he talked to one of surgery treams today who report likely would need to cont wound vac for donor site on abd/pelvic area as drains also still in place but that vanc on LE not expected after BKA and just ace bandages. Discussed typical post op process with fitting for prosthetics in a few weeks once incisions healed and he is aware of general process given his roommate had this last year with BKA himself. We talked about some of the projects they've been doing before this, he was renovating his home and had put in new appliances and got a good deal at a place in [place] called [facility] for refrigerator and also replaced his burners, , washer/dryer. He did a bathroom renovation and now is going to fully complete it to make a walk in jacuzzi tub for once he's able to use after healed from surgery as has a large walk in shower area and teak paneling and this will be convenient long term with recovery.      Review of Systems   Constitutional:  Negative for fever.   Respiratory:  Negative for shortness of breath.    Cardiovascular:  Negative for chest pain.   Gastrointestinal:  Negative for nausea and vomiting.   Musculoskeletal:  Positive for arthralgias (Right ankle).   Psychiatric/Behavioral:  Negative for confusion.      Objective:     Vital Signs (Most Recent):  Temp: 97.5 °F (36.4 °C) (09/25/24 1540)  Pulse: 79  (09/25/24 1540)  Resp: 19 (09/25/24 1540)  BP: 158/76 (09/25/24 1540)  SpO2: 96 % (09/25/24 1540) on room air Vital Signs (24h Range):  Temp:  [97.7 °F (36.5 °C)-98.5 °F (36.9 °C)] 97.8 °F (36.6 °C)  Pulse:  [] 100  Resp:  [15-20] 20  SpO2:  [94 %-96 %] 95 %  BP: (120-173)/(56-83) 173/83     Weight: 93 kg (205 lb 0.4 oz)  Body mass index is 34.12 kg/m².    Intake/Output Summary (Last 24 hours) at 9/28/2024 1040  Last data filed at 9/28/2024 0850  Gross per 24 hour   Intake --   Output 2110 ml   Net -2110 ml         Physical Exam  Vitals and nursing note reviewed.   Constitutional:       General: He is awake. He is not in acute distress.     Appearance: Normal appearance. He is well-developed. He is obese.      Comments: Patient sitting up in bed and spirits improving   Eyes:      Conjunctiva/sclera: Conjunctivae normal.   Cardiovascular:      Rate and Rhythm: Normal rate and regular rhythm.      Heart sounds: Normal heart sounds. No murmur heard.  Pulmonary:      Effort: Pulmonary effort is normal. No respiratory distress.      Breath sounds: Normal breath sounds. No wheezing.   Abdominal:      General: Abdomen is flat. Bowel sounds are normal. There is no distension.      Palpations: Abdomen is soft.      Tenderness: There is no abdominal tenderness.      Comments: Vac to groin/flank and 2 drains post flap attempt donation   Musculoskeletal:      Left lower leg: No edema.      Comments: Wound vac in place to right ankle and ACE bandage overlying wound vac.    Skin:     General: Skin is warm.      Findings: No erythema.   Neurological:      Mental Status: He is alert and oriented to person, place, and time.   Psychiatric:         Mood and Affect: Mood normal.         Behavior: Behavior normal. Behavior is cooperative.         Thought Content: Thought content normal.         Judgment: Judgment normal.             Significant Labs: CBC:   Recent Labs   Lab 09/26/24  2133 09/27/24  0449 09/28/24  0359   WBC  --   22.65* 15.37*   HGB  --  8.7* 9.0*   HCT 22* 25.5* 27.2*   PLT  --  417 400     CMP:   Recent Labs   Lab 09/27/24  0449 09/28/24  0359    136   K 5.3* 3.7    108   CO2 11* 18*   * 106   BUN 24* 23   CREATININE 1.3 1.3   CALCIUM 8.0* 7.9*   PROT 5.2* 5.4*   ALBUMIN 1.8* 1.8*   BILITOT 0.7 0.3   ALKPHOS 133 133   AST 16 16   ALT 5* 6*   ANIONGAP 19* 10     Blood Sugars (AccuCheck):    Recent Labs     09/27/24  0030 09/27/24  0726 09/27/24  1236 09/27/24  1541 09/27/24  2128 09/28/24  0646   POCTGLUCOSE 235* 280* 262* 322* 110 165*       Significant Imaging: I have reviewed all pertinent imaging results/findings within the past 24 hours.

## 2024-09-28 NOTE — PROGRESS NOTES
[facility]  Orthopedics  Progress Note    Patient Name: Cortes Schroeder  MRN: [id number]  Admission Date: 9/6/2024  Hospital Length of Stay: 22 days  Attending Provider: Maria Del Rosario Medina MD  Primary Care Provider: Andrew Sinclair Jr., MD  Follow-up For: Procedure(s) (LRB):  CREATION, FREE FLAP (Right)    Post-Operative Day: 2 Days Post-Op  Subjective:     Principal Problem:Surgical wound breakdown    Principal Orthopedic Problem: s/p I&D and wound vac placement on 09/06, 09/20     Interval History: Pain well controlled. Had some anxiety overnight. Plan for BKA right this week.       Review of patient's allergies indicates:   Allergen Reactions    Invokana [canagliflozin] Anaphylaxis    Percocet [oxycodone-acetaminophen] Nausea Only and Hallucinations    Biaxin [clarithromycin]     Hydrocodone Other (See Comments)     Dizzy/nausea/hallucinations    Sulfa (sulfonamide antibiotics) Nausea Only and Rash       Current Facility-Administered Medications   Medication    0.9%  NaCl infusion (for blood administration)    0.9%  NaCl infusion (for blood administration)    acetaminophen tablet 1,000 mg    albuterol-ipratropium 2.5 mg-0.5 mg/3 mL nebulizer solution 3 mL    amLODIPine tablet 10 mg    calcium carbonate 200 mg calcium (500 mg) chewable tablet 1,000 mg    dextrose 10% bolus 125 mL 125 mL    dextrose 10% bolus 125 mL 125 mL    dextrose 10% bolus 125 mL 125 mL    dextrose 10% bolus 250 mL 250 mL    dextrose 10% bolus 250 mL 250 mL    dextrose 10% bolus 250 mL 250 mL    enoxaparin injection 40 mg    glucagon (human recombinant) injection 1 mg    glucagon (human recombinant) injection 1 mg    glucose chewable tablet 16 g    glucose chewable tablet 24 g    hydrALAZINE tablet 50 mg    insulin aspart U-100 pen 0-10 Units    insulin aspart U-100 pen 4-8 Units    insulin glargine U-100 (Lantus) pen 20 Units    lactated ringers infusion    losartan tablet 50 mg    methocarbamoL tablet 500 mg    morphine  "injection 2 mg    morphine injection 4 mg    nortriptyline capsule 50 mg    ondansetron disintegrating tablet 8 mg    oxacillin 12 g in  mL CONTINUOUS INFUSION    pantoprazole EC tablet 40 mg    polyethylene glycol packet 17 g    prochlorperazine injection Soln 5 mg    senna tablet 8.6 mg    sodium bicarbonate tablet 650 mg    sucralfate 100 mg/mL suspension 1 g    vitamin D 1000 units tablet 1,000 Units     Objective:     Vital Signs (Most Recent):  Temp: 97.8 °F (36.6 °C) (09/28/24 0704)  Pulse: 100 (09/28/24 0704)  Resp: 20 (09/28/24 0704)  BP: (!) 173/83 (09/28/24 0704)  SpO2: 95 % (09/28/24 0704) Vital Signs (24h Range):  Temp:  [97.7 °F (36.5 °C)-98.5 °F (36.9 °C)] 97.8 °F (36.6 °C)  Pulse:  [] 100  Resp:  [15-20] 20  SpO2:  [94 %-96 %] 95 %  BP: (120-173)/(56-83) 173/83     Weight: 93 kg (205 lb 0.4 oz)  Height: 5' 5" (165.1 cm)  Body mass index is 34.12 kg/m².      Intake/Output Summary (Last 24 hours) at 9/28/2024 0717  Last data filed at 9/28/2024 0645  Gross per 24 hour   Intake --   Output 1950 ml   Net -1950 ml          Ortho/SPM Exam     AAOx4  NAD  Tachycardic  No increased WOB    RLE  Medial wound vac to good suction  Lateral side with fibrinous exudate and weeping ecchymosis distally  Compartments soft/compressible  Sensation diminished (at baseline)   Active toe dorsiflexion & plantarflexion  WWP. Brisk cap refill      Significant Labs:     Recent Labs   Lab 09/28/24  0359   WBC 15.37*   RBC 3.14*   HGB 9.0*   HCT 27.2*      MCV 87   MCH 28.7   MCHC 33.1         Significant Imaging: I have reviewed and interpreted all pertinent imaging results/findings.    Assessment/Plan:     * Surgical wound breakdown  Cortes Schroeder is a 64 y.o. male with PMH of DM, HTN  and right trimalleolar ankle fracture status post ex fix on 07/18 with subsequent definitive fixation on 07/26 admitted with right ankle wound dehiscence in the setting of uncontrolled diabetes.      He is s/p I&D of R ankle " 09/06. Repeat I&D R medial ankle 09/09. 9/17 angiography and medial ankle wound vac exchange.   To OR 9/20 and 9/23 for repeat I&D and vac exchange. Hardware removed on 9/23.   Free flap aborted 9/26 due to poor vascular inflow. Plan is for right BKA - will optimize patient and discuss timing with staff.     Pain control: multimodal regimen  PT/OT:  NWB RLE   DVT PPx: Lvx   Abx:  oxacillin continuous; ID following   Cultures:  ankle cx staph +    Dispo: plan for BKA likely next week          NUNO Armstrong MD  Orthopedics  [facility] - Surgery

## 2024-09-29 LAB
ALBUMIN SERPL BCP-MCNC: 1.6 G/DL (ref 3.5–5.2)
ALP SERPL-CCNC: 142 U/L (ref 55–135)
ALT SERPL W/O P-5'-P-CCNC: 5 U/L (ref 10–44)
ANION GAP SERPL CALC-SCNC: 9 MMOL/L (ref 8–16)
AST SERPL-CCNC: 17 U/L (ref 10–40)
BILIRUB SERPL-MCNC: 0.3 MG/DL (ref 0.1–1)
BUN SERPL-MCNC: 17 MG/DL (ref 8–23)
CALCIUM SERPL-MCNC: 7.7 MG/DL (ref 8.7–10.5)
CHLORIDE SERPL-SCNC: 108 MMOL/L (ref 95–110)
CO2 SERPL-SCNC: 17 MMOL/L (ref 23–29)
CREAT SERPL-MCNC: 0.9 MG/DL (ref 0.5–1.4)
ERYTHROCYTE [DISTWIDTH] IN BLOOD BY AUTOMATED COUNT: 17.6 % (ref 11.5–14.5)
EST. GFR  (NO RACE VARIABLE): >60 ML/MIN/1.73 M^2
GLUCOSE SERPL-MCNC: 160 MG/DL (ref 70–110)
HCT VFR BLD AUTO: 24.7 % (ref 40–54)
HGB BLD-MCNC: 8.1 G/DL (ref 14–18)
MCH RBC QN AUTO: 29.1 PG (ref 27–31)
MCHC RBC AUTO-ENTMCNC: 32.8 G/DL (ref 32–36)
MCV RBC AUTO: 89 FL (ref 82–98)
PLATELET # BLD AUTO: 399 K/UL (ref 150–450)
PMV BLD AUTO: 10 FL (ref 9.2–12.9)
POCT GLUCOSE: 140 MG/DL (ref 70–110)
POCT GLUCOSE: 150 MG/DL (ref 70–110)
POCT GLUCOSE: 163 MG/DL (ref 70–110)
POCT GLUCOSE: 186 MG/DL (ref 70–110)
POCT GLUCOSE: 321 MG/DL (ref 70–110)
POTASSIUM SERPL-SCNC: 3.9 MMOL/L (ref 3.5–5.1)
PROT SERPL-MCNC: 5.1 G/DL (ref 6–8.4)
RBC # BLD AUTO: 2.78 M/UL (ref 4.6–6.2)
SODIUM SERPL-SCNC: 134 MMOL/L (ref 136–145)
WBC # BLD AUTO: 17.12 K/UL (ref 3.9–12.7)

## 2024-09-29 PROCEDURE — 25000003 PHARM REV CODE 250: Performed by: STUDENT IN AN ORGANIZED HEALTH CARE EDUCATION/TRAINING PROGRAM

## 2024-09-29 PROCEDURE — 63600175 PHARM REV CODE 636 W HCPCS: Performed by: STUDENT IN AN ORGANIZED HEALTH CARE EDUCATION/TRAINING PROGRAM

## 2024-09-29 PROCEDURE — 11000001 HC ACUTE MED/SURG PRIVATE ROOM

## 2024-09-29 PROCEDURE — 80053 COMPREHEN METABOLIC PANEL: CPT | Performed by: HOSPITALIST

## 2024-09-29 PROCEDURE — 25000003 PHARM REV CODE 250: Performed by: HOSPITALIST

## 2024-09-29 PROCEDURE — 36415 COLL VENOUS BLD VENIPUNCTURE: CPT | Performed by: HOSPITALIST

## 2024-09-29 PROCEDURE — 85027 COMPLETE CBC AUTOMATED: CPT | Performed by: HOSPITALIST

## 2024-09-29 RX ORDER — HYDRALAZINE HYDROCHLORIDE 25 MG/1
25 TABLET, FILM COATED ORAL EVERY 8 HOURS
Status: DISCONTINUED | OUTPATIENT
Start: 2024-09-29 | End: 2024-09-29

## 2024-09-29 RX ORDER — HYDRALAZINE HYDROCHLORIDE 50 MG/1
50 TABLET, FILM COATED ORAL EVERY 8 HOURS
Status: DISCONTINUED | OUTPATIENT
Start: 2024-09-29 | End: 2024-10-04 | Stop reason: HOSPADM

## 2024-09-29 RX ORDER — TRAZODONE HYDROCHLORIDE 50 MG/1
50 TABLET ORAL NIGHTLY
Status: DISCONTINUED | OUTPATIENT
Start: 2024-09-29 | End: 2024-10-03

## 2024-09-29 RX ADMIN — ACETAMINOPHEN 1000 MG: 325 TABLET ORAL at 09:09

## 2024-09-29 RX ADMIN — MORPHINE SULFATE 4 MG: 4 INJECTION INTRAVENOUS at 09:09

## 2024-09-29 RX ADMIN — HYDRALAZINE HYDROCHLORIDE 50 MG: 50 TABLET ORAL at 01:09

## 2024-09-29 RX ADMIN — HYDRALAZINE HYDROCHLORIDE 50 MG: 50 TABLET ORAL at 09:09

## 2024-09-29 RX ADMIN — ACETAMINOPHEN 1000 MG: 325 TABLET ORAL at 01:09

## 2024-09-29 RX ADMIN — CHOLECALCIFEROL TAB 25 MCG (1000 UNIT) 1000 UNITS: 25 TAB at 08:09

## 2024-09-29 RX ADMIN — ACETAMINOPHEN 1000 MG: 325 TABLET ORAL at 05:09

## 2024-09-29 RX ADMIN — SODIUM BICARBONATE 650 MG: 650 TABLET ORAL at 02:09

## 2024-09-29 RX ADMIN — INSULIN ASPART 4 UNITS: 100 INJECTION, SOLUTION INTRAVENOUS; SUBCUTANEOUS at 12:09

## 2024-09-29 RX ADMIN — HYDRALAZINE HYDROCHLORIDE 50 MG: 50 TABLET ORAL at 08:09

## 2024-09-29 RX ADMIN — PANTOPRAZOLE SODIUM 40 MG: 40 TABLET, DELAYED RELEASE ORAL at 08:09

## 2024-09-29 RX ADMIN — METHOCARBAMOL 500 MG: 500 TABLET ORAL at 07:09

## 2024-09-29 RX ADMIN — AMLODIPINE BESYLATE 10 MG: 10 TABLET ORAL at 08:09

## 2024-09-29 RX ADMIN — NORTRIPTYLINE HYDROCHLORIDE 50 MG: 25 CAPSULE ORAL at 09:09

## 2024-09-29 RX ADMIN — INSULIN ASPART 8 UNITS: 100 INJECTION, SOLUTION INTRAVENOUS; SUBCUTANEOUS at 04:09

## 2024-09-29 RX ADMIN — LOSARTAN POTASSIUM 50 MG: 25 TABLET, FILM COATED ORAL at 08:09

## 2024-09-29 RX ADMIN — SODIUM BICARBONATE 650 MG: 650 TABLET ORAL at 09:09

## 2024-09-29 RX ADMIN — SUCRALFATE 1 G: 1 SUSPENSION ORAL at 05:09

## 2024-09-29 RX ADMIN — SUCRALFATE 1 G: 1 SUSPENSION ORAL at 04:09

## 2024-09-29 RX ADMIN — SUCRALFATE 1 G: 1 SUSPENSION ORAL at 12:09

## 2024-09-29 RX ADMIN — INSULIN ASPART 2 UNITS: 100 INJECTION, SOLUTION INTRAVENOUS; SUBCUTANEOUS at 12:09

## 2024-09-29 RX ADMIN — METHOCARBAMOL 500 MG: 500 TABLET ORAL at 05:09

## 2024-09-29 RX ADMIN — INSULIN ASPART 4 UNITS: 100 INJECTION, SOLUTION INTRAVENOUS; SUBCUTANEOUS at 08:09

## 2024-09-29 RX ADMIN — SODIUM BICARBONATE 650 MG: 650 TABLET ORAL at 08:09

## 2024-09-29 RX ADMIN — TRAZODONE HYDROCHLORIDE 50 MG: 50 TABLET ORAL at 09:09

## 2024-09-29 RX ADMIN — ONDANSETRON 8 MG: 8 TABLET, ORALLY DISINTEGRATING ORAL at 08:09

## 2024-09-29 RX ADMIN — INSULIN GLARGINE 20 UNITS: 100 INJECTION, SOLUTION SUBCUTANEOUS at 08:09

## 2024-09-29 RX ADMIN — OXACILLIN 12 G: 2 INJECTION, POWDER, FOR SOLUTION INTRAMUSCULAR; INTRAVENOUS at 12:09

## 2024-09-29 RX ADMIN — ENOXAPARIN SODIUM 40 MG: 40 INJECTION SUBCUTANEOUS at 04:09

## 2024-09-29 NOTE — SUBJECTIVE & OBJECTIVE
Principal Problem:Surgical wound breakdown    Principal Orthopedic Problem: s/p I&D and wound vac placement on 09/06, 09/20     Interval History: Pain well controlled. Had some anxiety overnight. Plan for BKA right this week.       Review of patient's allergies indicates:   Allergen Reactions    Invokana [canagliflozin] Anaphylaxis    Percocet [oxycodone-acetaminophen] Nausea Only and Hallucinations    Biaxin [clarithromycin]     Hydrocodone Other (See Comments)     Dizzy/nausea/hallucinations    Sulfa (sulfonamide antibiotics) Nausea Only and Rash       Current Facility-Administered Medications   Medication    0.9%  NaCl infusion (for blood administration)    0.9%  NaCl infusion (for blood administration)    acetaminophen tablet 1,000 mg    albuterol-ipratropium 2.5 mg-0.5 mg/3 mL nebulizer solution 3 mL    amLODIPine tablet 10 mg    calcium carbonate 200 mg calcium (500 mg) chewable tablet 1,000 mg    dextrose 10% bolus 125 mL 125 mL    dextrose 10% bolus 125 mL 125 mL    dextrose 10% bolus 125 mL 125 mL    dextrose 10% bolus 250 mL 250 mL    dextrose 10% bolus 250 mL 250 mL    dextrose 10% bolus 250 mL 250 mL    enoxaparin injection 40 mg    glucagon (human recombinant) injection 1 mg    glucagon (human recombinant) injection 1 mg    glucose chewable tablet 16 g    glucose chewable tablet 24 g    hydrALAZINE tablet 25 mg    hydrALAZINE tablet 50 mg    insulin aspart U-100 pen 0-10 Units    insulin aspart U-100 pen 4-8 Units    insulin glargine U-100 (Lantus) pen 20 Units    lactated ringers infusion    LORazepam tablet 0.5 mg    losartan tablet 50 mg    methocarbamoL tablet 500 mg    morphine injection 2 mg    morphine injection 4 mg    nortriptyline capsule 50 mg    ondansetron disintegrating tablet 8 mg    oxacillin 12 g in  mL CONTINUOUS INFUSION    pantoprazole EC tablet 40 mg    polyethylene glycol packet 17 g    prochlorperazine injection Soln 5 mg    senna tablet 8.6 mg    sodium bicarbonate tablet  "650 mg    sucralfate 100 mg/mL suspension 1 g    vitamin D 1000 units tablet 1,000 Units     Objective:     Vital Signs (Most Recent):  Temp: 98.6 °F (37 °C) (09/29/24 0708)  Pulse: 107 (09/29/24 0708)  Resp: 17 (09/29/24 0503)  BP: (!) 180/79 (09/29/24 0708)  SpO2: 97 % (09/29/24 0708) Vital Signs (24h Range):  Temp:  [97.8 °F (36.6 °C)-98.6 °F (37 °C)] 98.6 °F (37 °C)  Pulse:  [] 107  Resp:  [16-18] 17  SpO2:  [96 %-98 %] 97 %  BP: (133-180)/(63-79) 180/79     Weight: 93 kg (205 lb 0.4 oz)  Height: 5' 5" (165.1 cm)  Body mass index is 34.12 kg/m².      Intake/Output Summary (Last 24 hours) at 9/29/2024 0743  Last data filed at 9/28/2024 2109  Gross per 24 hour   Intake --   Output 1235 ml   Net -1235 ml          Ortho/SPM Exam     AAOx4  NAD  Tachycardic  No increased WOB    RLE  Medial wound vac to good suction  Lateral side with fibrinous exudate and weeping ecchymosis distally  Compartments soft/compressible  Sensation diminished (at baseline)   Active toe dorsiflexion & plantarflexion  WWP. Brisk cap refill      Significant Labs:     Recent Labs   Lab 09/29/24  0628   WBC 17.12*   RBC 2.78*   HGB 8.1*   HCT 24.7*      MCV 89   MCH 29.1   MCHC 32.8         Significant Imaging: I have reviewed and interpreted all pertinent imaging results/findings.    "

## 2024-09-29 NOTE — CONSULTS
20G 1 3/4in PIV placed to RFA, using US guidance.  20G 1 3/4in PIV placed to ANTOINE, using US guidance.

## 2024-09-29 NOTE — ASSESSMENT & PLAN NOTE
Had panic episodes and pulling at lines when he woke up in panic on 9/28 overnight. Start trazodone for sleep and has ativan prn if anxiety severe. Dc telemetry as has had no ectopy through 3+ weeks of admit and 1 less line to cause distress or panic

## 2024-09-29 NOTE — PLAN OF CARE
Problem: Adult Inpatient Plan of Care  Goal: Absence of Hospital-Acquired Illness or Injury  Outcome: Progressing  Goal: Optimal Comfort and Wellbeing  Outcome: Progressing  Goal: Readiness for Transition of Care  Outcome: Progressing     Problem: Sepsis/Septic Shock  Goal: Optimal Coping  Outcome: Progressing     Pt is progressing towards plan of care goals. Pt does not report any pain throughout shift. Pt got up by himself and pulled out all IV (midline consult added) and both NATALEE drains. NATALEE drain sites are covered with yellow tegaderm and gauze. On call surgery notified. On call provider assessed patient at bedside and gave okay to maintain drain site as is and woundvac. Explained plan of care, pt verbalized understanding. Educated pt on need to call for assistance for ambulation. No injury during shift, Side rails up x 3x call light by bedside.

## 2024-09-29 NOTE — PROGRESS NOTES
Plastic and Reconstructive Surgery   Progress Note    Subjective:    Patient accidentally removed drains last night when standing up to use restroom. Vac still in place. Corner appears to be more ischemic     Objective:  Vital signs in last 24 hours:  Temp:  [97.8 °F (36.6 °C)-98.6 °F (37 °C)] 98.4 °F (36.9 °C)  Pulse:  [] 106  Resp:  [16-18] 17  SpO2:  [96 %-98 %] 96 %  BP: (143-180)/(63-79) 143/63    Intake/Output last 3 shifts:  I/O last 3 completed shifts:  In: -   Out: 2540 [Urine:1970; Drains:230; Other:340]    Intake/Output this shift:  I/O this shift:  In: -   Out: 300 [Urine:300]        Physical Exam:  VITAL SIGNS:   Vitals:    24 0503 24 0705 24 0708 24 1105   BP: (!) 143/63  (!) 180/79 (!) 143/63   BP Location:   Left arm Left arm   Patient Position: Lying  Lying Lying   Pulse: 106 108 107 106   Resp: 17      Temp: 97.8 °F (36.6 °C)  98.6 °F (37 °C) 98.4 °F (36.9 °C)   TempSrc: Oral  Tympanic Tympanic   SpO2: 98%  97% 96%   Weight:       Height:         TMAX: Temp (24hrs), Av.1 °F (36.7 °C), Min:97.8 °F (36.6 °C), Max:98.6 °F (37 °C)      General: Alert; No acute distress  Cardiovascular: Regular rate   Respiratory: Normal respiratory effort. Chest rise symmetric.   Abdomen: Soft, nontender, nondistended  Extremity:  RLE vac in place , able to wiggle toes, sensation intact, splint in place, Wound vac - serosang  Incisional vac on back incision, adequate suction, minimal output, no hematoma/ seroma on back yet  Neurologic: No focal deficit. Speech normal      Scheduled Medications acetaminophen, 1,000 mg, Q8H  amLODIPine, 10 mg, Daily  enoxaparin, 40 mg, Daily  hydrALAZINE, 50 mg, Q8H  insulin aspart U-100, 4-8 Units, TIDWM  insulin glargine U-100, 20 Units, Daily  losartan, 50 mg, Daily  nortriptyline, 50 mg, Nightly  oxacillin 12 g in  mL CONTINUOUS INFUSION, 12 g, Q24H  pantoprazole, 40 mg, Daily  polyethylene glycol, 17 g, BID  senna, 8.6 mg, BID  sodium  bicarbonate, 650 mg, TID  sucralfate, 1 g, Q6H  traZODone, 50 mg, QHS  vitamin D, 1,000 Units, Daily        PRN Medications     Current Facility-Administered Medications:     0.9%  NaCl infusion (for blood administration), , Intravenous, Q24H PRN    0.9%  NaCl infusion (for blood administration), , Intravenous, Q24H PRN    albuterol-ipratropium, 3 mL, Nebulization, Q4H PRN    calcium carbonate, 1,000 mg, Oral, TID PRN    dextrose 10%, 12.5 g, Intravenous, PRN    dextrose 10%, 12.5 g, Intravenous, PRN    dextrose 10%, 12.5 g, Intravenous, PRN    dextrose 10%, 25 g, Intravenous, PRN    dextrose 10%, 25 g, Intravenous, PRN    dextrose 10%, 25 g, Intravenous, PRN    glucagon (human recombinant), 1 mg, Intramuscular, PRN    glucagon (human recombinant), 1 mg, Intramuscular, PRN    glucose, 16 g, Oral, PRN    glucose, 24 g, Oral, PRN    hydrALAZINE, 50 mg, Oral, Q8H PRN    insulin aspart U-100, 0-10 Units, Subcutaneous, QID (AC + HS) PRN    LORazepam, 0.5 mg, Oral, Q6H PRN    methocarbamoL, 500 mg, Oral, TID PRN    morphine, 2 mg, Intravenous, Q6H PRN    morphine, 4 mg, Intravenous, Q6H PRN    ondansetron, 8 mg, Oral, Q8H PRN    prochlorperazine, 5 mg, Intravenous, Q30 Min PRN    Recent Labs:   Lab Results   Component Value Date    WBC 17.12 (H) 09/29/2024    HGB 8.1 (L) 09/29/2024    HCT 24.7 (L) 09/29/2024    MCV 89 09/29/2024     09/29/2024     Lab Results   Component Value Date     (H) 09/29/2024     (L) 09/29/2024    K 3.9 09/29/2024     09/29/2024    BUN 17 09/29/2024         Assessment:   64 y.o. y/o male s/p Procedure(s):  REPLACEMENT, WOUND VAC; white & black sponge  REMOVAL, HARDWARE, ANKLE      3 Days Post-Op     64 y.o.male W/ right ankle post-surgical medial wound dehiscence and exposed hardware.      Now s/p PTA RLE PT/AT, completion angiogram showing 3 vessel run off, also washout and vac exchange    S/p Attempted Parascapular flap- however calcified PT and poor inflow     Plan  -  Drains: removed on accident by patient. Will monitor for seroma  - back wound vac with adequate seal  - RLE wound vac with seal in splint  - Reg diet  - Abx: per ID  - DVT ppx, IS, PT/OT  - Appreciate Ortho recs for bka next week  - will plan for back wound vac takedown on the day of amputation to assess if any part of the back needs debridement while in OR for BKA      Pt was d/w attending surgeon, Dr. Rom Silva MD   Plastic Surgery Fellow  9/29/2024

## 2024-09-29 NOTE — CARE UPDATE
-Glucose Goal 140-180    -A1C:   Hemoglobin A1C   Date Value Ref Range Status   09/06/2024 8.5 (H) 4.0 - 5.6 % Final     Comment:     ADA Screening Guidelines:  5.7-6.4%  Consistent with prediabetes  >or=6.5%  Consistent with diabetes    High levels of fetal hemoglobin interfere with the HbA1C  assay. Heterozygous hemoglobin variants (HbS, HgC, etc)do  not significantly interfere with this assay.   However, presence of multiple variants may affect accuracy.           -HOME REGIMEN: Humalog via OmniPod insulin pump  Mounjaro 10 mg weekly     Current pump settings:  Basal 12 am to 2.3 and 6 am to 1.55  ICR to 3 at 12 am, 2 at 4 pm  ISF to 1:20   AIT 3 hrs  Target 110-120    -GLUCOSE TREND FOR THE PAST 24HRS:   Recent Labs   Lab 09/28/24  0646 09/28/24  1052 09/28/24  1543 09/28/24  2119 09/29/24  0710 09/29/24  0721   POCTGLUCOSE 165* 189* 169* 77 163* 150*         -NO HYPOGYCEMIAS NOTED     - Diet  Diet diabetic 2000 Calorie    -TOLERATING 50 % OF PO DIET     Addendum: patient went downstairs again today despite advising patient to avoid doing so for lunch and ate sugar covered pretzels, leading to BG excursion > 300 prior to dinner.     Plan:   - Lantus (Insulin Glargine) 20 units daily (20% increase due to fasting blood glucose above goal)  - Novolog 4-8 units TIDWM (Administer    4   units if patient eats 25-50% of meal, administer    8    units if patient eats > 50% of meal.)  - [facility] SSI (150/25)  - BG checks AC/HS  - Hypoglycemia protocol in place     ** Please notify Endocrine for any change and/or advance in diet**  ** Please call Endocrine for any BG related issues **     Discharge Planning:   TBD. Please notify endocrinology prior to discharge.

## 2024-09-29 NOTE — PROGRESS NOTES
[facility]  Orthopedics  Progress Note    Patient Name: Cortes Schroeder  MRN: [id number]  Admission Date: 9/6/2024  Hospital Length of Stay: 23 days  Attending Provider: Maria Del Rosario Medina MD  Primary Care Provider: Andrew Sinclair Jr., MD  Follow-up For: Procedure(s) (LRB):  CREATION, FREE FLAP (Right)    Post-Operative Day: 3 Days Post-Op  Subjective:     Principal Problem:Surgical wound breakdown    Principal Orthopedic Problem: s/p I&D and wound vac placement on 09/06, 09/20     Interval History: Pain well controlled. Had some anxiety overnight. Plan for BKA right this week.       Review of patient's allergies indicates:   Allergen Reactions    Invokana [canagliflozin] Anaphylaxis    Percocet [oxycodone-acetaminophen] Nausea Only and Hallucinations    Biaxin [clarithromycin]     Hydrocodone Other (See Comments)     Dizzy/nausea/hallucinations    Sulfa (sulfonamide antibiotics) Nausea Only and Rash       Current Facility-Administered Medications   Medication    0.9%  NaCl infusion (for blood administration)    0.9%  NaCl infusion (for blood administration)    acetaminophen tablet 1,000 mg    albuterol-ipratropium 2.5 mg-0.5 mg/3 mL nebulizer solution 3 mL    amLODIPine tablet 10 mg    calcium carbonate 200 mg calcium (500 mg) chewable tablet 1,000 mg    dextrose 10% bolus 125 mL 125 mL    dextrose 10% bolus 125 mL 125 mL    dextrose 10% bolus 125 mL 125 mL    dextrose 10% bolus 250 mL 250 mL    dextrose 10% bolus 250 mL 250 mL    dextrose 10% bolus 250 mL 250 mL    enoxaparin injection 40 mg    glucagon (human recombinant) injection 1 mg    glucagon (human recombinant) injection 1 mg    glucose chewable tablet 16 g    glucose chewable tablet 24 g    hydrALAZINE tablet 25 mg    hydrALAZINE tablet 50 mg    insulin aspart U-100 pen 0-10 Units    insulin aspart U-100 pen 4-8 Units    insulin glargine U-100 (Lantus) pen 20 Units    lactated ringers infusion    LORazepam tablet 0.5 mg    losartan tablet 50  "mg    methocarbamoL tablet 500 mg    morphine injection 2 mg    morphine injection 4 mg    nortriptyline capsule 50 mg    ondansetron disintegrating tablet 8 mg    oxacillin 12 g in  mL CONTINUOUS INFUSION    pantoprazole EC tablet 40 mg    polyethylene glycol packet 17 g    prochlorperazine injection Soln 5 mg    senna tablet 8.6 mg    sodium bicarbonate tablet 650 mg    sucralfate 100 mg/mL suspension 1 g    vitamin D 1000 units tablet 1,000 Units     Objective:     Vital Signs (Most Recent):  Temp: 98.6 °F (37 °C) (09/29/24 0708)  Pulse: 107 (09/29/24 0708)  Resp: 17 (09/29/24 0503)  BP: (!) 180/79 (09/29/24 0708)  SpO2: 97 % (09/29/24 0708) Vital Signs (24h Range):  Temp:  [97.8 °F (36.6 °C)-98.6 °F (37 °C)] 98.6 °F (37 °C)  Pulse:  [] 107  Resp:  [16-18] 17  SpO2:  [96 %-98 %] 97 %  BP: (133-180)/(63-79) 180/79     Weight: 93 kg (205 lb 0.4 oz)  Height: 5' 5" (165.1 cm)  Body mass index is 34.12 kg/m².      Intake/Output Summary (Last 24 hours) at 9/29/2024 0743  Last data filed at 9/28/2024 2109  Gross per 24 hour   Intake --   Output 1235 ml   Net -1235 ml          Ortho/SPM Exam     AAOx4  NAD  Tachycardic  No increased WOB    RLE  Medial wound vac to good suction  Lateral side with fibrinous exudate and weeping ecchymosis distally  Compartments soft/compressible  Sensation diminished (at baseline)   Active toe dorsiflexion & plantarflexion  WWP. Brisk cap refill      Significant Labs:     Recent Labs   Lab 09/29/24  0628   WBC 17.12*   RBC 2.78*   HGB 8.1*   HCT 24.7*      MCV 89   MCH 29.1   MCHC 32.8         Significant Imaging: I have reviewed and interpreted all pertinent imaging results/findings.    Assessment/Plan:     * Surgical wound breakdown  Cortes Schroeder is a 64 y.o. male with PMH of DM, HTN  and right trimalleolar ankle fracture status post ex fix on 07/18 with subsequent definitive fixation on 07/26 admitted with right ankle wound dehiscence in the setting of uncontrolled " diabetes.      He is s/p I&D of R ankle 09/06. Repeat I&D R medial ankle 09/09. 9/17 angiography and medial ankle wound vac exchange.   To OR 9/20 and 9/23 for repeat I&D and vac exchange. Hardware removed on 9/23.   Free flap aborted 9/26 due to poor vascular inflow. Plan is for right BKA - will optimize patient and discuss timing with staff.     Pain control: multimodal regimen  PT/OT:  NWB RLE   DVT PPx: Lvx   Abx:  oxacillin continuous; ID following   Cultures:  ankle cx staph +    Dispo: plan for BKA likely next week          NUNO Armstrong MD  Orthopedics  [facility] - Surgery

## 2024-09-29 NOTE — PROGRESS NOTES
[facility] Medicine  Progress Note    Patient Name: Cortes Schroeder  MRN: [id number]  Patient Class: IP- Inpatient   Admission Date: 9/6/2024  Length of Stay: 23 days  Attending Physician: Maria Del Rosario Medina MD  Primary Care Provider: Andrew Sinclair Jr., MD        Subjective:     Principal Problem:Surgical wound breakdown        HPI:  Cortes Schroeder is a 64 y.o. M with PMHx of anxiety, T2DM, GERD, HLD, HTN who presented to ED for AMS. He had a fall in July that resulted in R trimalleolar fracture and L distal radius fracture. He had external fixation on 07/18 and then ORIF on 07/26 with Dr. Liz. He was prescribed clinda 300 mg on 08/28 for a ten day course. Patient was doing well when he acutely became altered and not acting like himself a few hours ago. Patient family member drove him here from [facility] for ortho consult. R medial ankle wound dehisced with redness and swelling around it. No CPM fever, cough, N/V/D or seizure.    In ED: T max 99.1. SIRS 2/4 with WBC 18 and . CMP notable for Na 127, Cr 1.7, . Phos 1.9. .3. BNP 73. Trop neg. A1c 8.5. R tib fib XR notes There are 2 abandoned anterior-posteriorly oriented pin tracks in the right mid tibial shaft.  On AP views, each of these pin tracks demonstrates a small faint internal density, possibly representing a sequestrum. Ortho and endocrine consulted. Given IV vanc and IV clindamycin. Given 2.7L IVFs. Admitted to hospital medicine for sepsis 2/2 infected hardware.     Overview/Hospital Course:  Cortes Schroeder is a 63 y/o male admitted to hospital medicine for sepsis related to right ankle from surgical wound infection in patient with recent ORIF of right ankle fracture done on 7/26/2024. Patient started on empiric IV Vancomycin and IV Cefepime and given IVF's as per sepsis protocol. Orthopedics consulted and patient taken to OR on 9/6/2024 and had irrigation debridement of right surgical incision and  placement of wound vac.Endocrine consulted to assist in management of his diabetes while in hospital. Blood cultures from admit returned with MSSA. Infectious Disease consulted. Wound culture returned positive for Group B streptococcus and MSSA. Echo done due to bacteremia without concern for vegetations. ID changed antibiotics to IV Oxacillin. Patient with new cough and worsening WBC. CTA chest negative for PE but notes small area of ground glass changes to RUL. Patient placed on 5 day course of po Doxycycline to treat as per ID and completed for possible pneumonia. Patient taken back to OR on 09/09/2024 with Ortho and had another irrigation and debridement and replacement of wound vac to right ankle. Plastics consulted for flap evaluation and recommended vascular evaluation. CTA right lower extremity done and showed moderate atherosclerosis and multifocal high grade stenosis throughout right calf arteries. Dietary consulted for malnutrition and started on Boost supplementation to maximize his nutrition for a flap and wound healing. Vascular surgery consulted per Plastics recs as they would like to pre-optimize blood flow prior to any free flap or pedicled propellar flap. Vascular recommended angiogram of right leg but patient developed DEEPALI. Nephrology consulted and suspected ATN related to contrast nephropathy. Patient placed on supportive care. DEEPALI resolved. Patyoanetn taken for unilateral angiogram by Vascular on 9/17 and underwent PTA of right posterior tibial artery and right anterior tibial artery. After revascularization of right leg pl;an to do flap but working on timing with Ortho and Plastics. Patient to go back to OR again on 9/20 for another irrigation and debridement and replacement of wound vac to right ankle wound by Ortho. Plastics plans propeller flap to right ankle on 9/25 and then will need to remain in hospital for 1 week after flap for dangling protocol and monitoring of flap per Plastics. Repeat  blood cultures from 9/8 and 9/10 no growth. Patient taken back to OR on 9/20 with Dr. Moe Liz and had another I&D of right ankle and wound and replacement of wound vac. Patient to remain non weight bearing post-op and Plastics plans flap placement while in hospital on 9/25. Patient to go back to OR on for another I&D and wound vac change on 9/23. Patient taken back to OR on 9/23 with Dr. Liz and had another I&D and wound vac change with Orthopedics. Ortho noted anterolateral foot eschar developing. Distal lateral wound breakdown and ortho opened up majority of lateral wound and excised surrounding skin and subcutaneous tissue. Ortho removed all ankle hardware. Wound vacs placed medially and laterally. Patient re-evaluated by Plastics on 9/25 and state due to lateral wound in addition to anterior wound no longer a candidate for propellar flap and plastic will need to do free flap to cover both wounds. Plastics concerned if no adequate vascular targets, will be unable to cover and BKA is only option. Patient not very happy. Patient to go to OR On 9/26 for free flap.     Interval History: reports he woke up last night and had some panic/confusion and started pulling at his lines/vac before he realized what he was doing. Asked for sleep med tonight and starting trazodone. Added ativan yest prn as having some anxiety/panic attacks at times with everything going on medically. His roommate hugo coming has been helping with this. He has been having him talk to other friends as having lots of people try to call to get updates form him has been overwhelming. The vac and the bandages on left flank/vac have been causing some irritation and so if the bandages that is right at left axilla beyond vac is causing chafing then can have nursing put tegrederm over it if needed. He's going to have hugo take him around lobby today for fresh air as he reports that helped his anxiety Friday. He is jsut rady to have the surgery  even though isnt that option he wanted he's tired of dealing with the ankle and being in the hospital and is a step towards end of hospital stay. He's really hoping to go home but I told him that likely will need rehab as dont want to bump the stump or open it up if he's not used to transfers as it will al be different after the surgery and will need more PT/OT then to revluate. Dr velez talked to him yesterday about the surgery and plan for Tuesday. Plan to takl to ID Tuesday as once surgery done will be able to almost certainly stop iv antibiotics for source control as already compelted much more than 2 weeks for bacteremia and hardware will be gone so okay to do a [facility] are going to be nixed after the bka given this      Review of Systems   Constitutional:  Negative for fever.   Respiratory:  Negative for shortness of breath.    Cardiovascular:  Negative for chest pain.   Gastrointestinal:  Negative for nausea and vomiting.   Musculoskeletal:  Positive for arthralgias (Right ankle).   Psychiatric/Behavioral:  Negative for confusion.      Objective:     Vital Signs (Most Recent):  Temp: 97.5 °F (36.4 °C) (09/25/24 1540)  Pulse: 79 (09/25/24 1540)  Resp: 19 (09/25/24 1540)  BP: 158/76 (09/25/24 1540)  SpO2: 96 % (09/25/24 1540) on room air Vital Signs (24h Range):  Temp:  [97.8 °F (36.6 °C)-98.6 °F (37 °C)] 98.4 °F (36.9 °C)  Pulse:  [] 106  Resp:  [16-18] 17  SpO2:  [96 %-98 %] 96 %  BP: (143-180)/(63-79) 143/63     Weight: 93 kg (205 lb 0.4 oz)  Body mass index is 34.12 kg/m².    Intake/Output Summary (Last 24 hours) at 9/29/2024 1106  Last data filed at 9/29/2024 0843  Gross per 24 hour   Intake --   Output 1105 ml   Net -1105 ml         Physical Exam  Vitals and nursing note reviewed.   Constitutional:       General: He is awake. He is not in acute distress.     Appearance: Normal appearance. He is well-developed. He is obese.      Comments: Patient  sitting up in bed and spirits improving   Eyes:      Conjunctiva/sclera: Conjunctivae normal.   Cardiovascular:      Rate and Rhythm: Normal rate and regular rhythm.      Heart sounds: Normal heart sounds. No murmur heard.  Pulmonary:      Effort: Pulmonary effort is normal. No respiratory distress.      Breath sounds: Normal breath sounds. No wheezing.   Abdominal:      General: Abdomen is flat. Bowel sounds are normal. There is no distension.      Palpations: Abdomen is soft.      Tenderness: There is no abdominal tenderness.      Comments: Vac to left back/flank and 2 drains post flap attempt donation   Musculoskeletal:      Left lower leg: No edema.      Comments: Wound vac in place to right ankle and ACE bandage overlying wound vac.    Skin:     General: Skin is warm.      Findings: No erythema.   Neurological:      Mental Status: He is alert and oriented to person, place, and time.   Psychiatric:         Mood and Affect: Mood normal.         Behavior: Behavior normal. Behavior is cooperative.         Thought Content: Thought content normal.         Judgment: Judgment normal.             Significant Labs: CBC:   Recent Labs   Lab 09/28/24  0359 09/29/24  0628   WBC 15.37* 17.12*   HGB 9.0* 8.1*   HCT 27.2* 24.7*    399     CMP:   Recent Labs   Lab 09/28/24  0359 09/29/24  0628    134*   K 3.7 3.9    108   CO2 18* 17*    160*   BUN 23 17   CREATININE 1.3 0.9   CALCIUM 7.9* 7.7*   PROT 5.4* 5.1*   ALBUMIN 1.8* 1.6*   BILITOT 0.3 0.3   ALKPHOS 133 142*   AST 16 17   ALT 6* 5*   ANIONGAP 10 9     Blood Sugars (AccuCheck):    Recent Labs     09/28/24  0646 09/28/24  1052 09/28/24  1543 09/28/24  2119 09/29/24  0710 09/29/24  0721   POCTGLUCOSE 165* 189* 169* 77 163* 150*       Significant Imaging: I have reviewed all pertinent imaging results/findings within the past 24 hours.    Assessment/Plan:      * Surgical wound breakdown  Closed trimalleolar fracture of right ankle s/p ORIF in  7/2024  Surgical site infection  65 yo male presenting with confusion and worsening redness, swelling and pain to right ankle. Patient with sepsis criteria on admit and right ankle wound felt to be source of infection.   - Ortho consulted and patient taken to OR 9/06/2024 for debridement of surgical wound with wound vac placed. Cultures taken and grew both MSSA and Group B streptococcus from wound. Patient taken back again to OR with Ortho with Dr. Liz and patient had another I&Dand wound vac change on 9/9/2024. Orthopedics took back to OR again on 9/20/2024 with Dr. Liz and underwent another I&D of right ankle and wound and replacement of wound vac. Patient taken back to OR with OR again on 9/23 with Dr. Liz and had another I&D and wound vac change with Orthopedics. Ortho noted anterolateral foot eschar developing. Distal lateral wound breakdown and ortho opened up majority of lateral wound and excised surrounding skin and subcutaneous tissue. Ortho removed all ankle hardware. Wound vacs placed medially and laterally.   - Orthopedics recommended Plastics evaluation for free flap and as part of evaluation recommended Vascular evaluation. Vascular evaluated patient and angiogram of right leg done and PTA done and revascularization of right PT/AT. Plastics plan propeller flap on 9/25/2024 but unfortunately due to presence of lateral and anterior wounds to right ankle no longer candidate for propeller flap so now will need free flap to cover both wounds. Plastics plans to take to OR on 9/26 for free flap to right ankle wounds. Plastics reports if unable to do free flap then patient's only option will be a BKA.  He understands and will f/u further 9/26 OR recs and was in OR for 8 hours with attemptes at targets at least 3 times but was not successful unfortunately and now will plan for BKA with ortho. Will almost certainly not need any antibiotics after OR now given this, will reconfirm with ID tues/wed  after BKA and will likely need IP rehab to recover and will plan for PM and R consult after surgery  -OR scheduled for 10/1 with dr velez  - Wound cultures -- MSSA and Group B strep from right ankle.   - Blood cultures -- MSSA on admit but repeat blood cultures negative.   - ID consulted and following and appreciate recs:  - Continue IV Oxacillin 12 g every 24 hours continuous infusion. Plan for orals if hardware retained once completed.  - Anticipate 6 weeks of IV antibiotics from date of most recent washout.  - Continue PT/OT and non weight bearing to right lower extremity as per Ortho. Continue wound vac as per Ortho to surgical site.  - Pain controlled and continue multimodals.   - Continue Lovenox 40 mg subcutaneous daily for DVT prophylaxis.     Airway malacia  Noted on CT scan of chest. Patient with no acute issues and incidentally noted on CT scan of chest. Patient asymptomatic.       PAD (peripheral artery disease)  Present on admit. Patient noted to have high grade stenosis on CTA of right leg. Vascular surgery consulted and patient taken to OR and had unilateral angiogram of right leg and had PT/AT artery stenosis on 9/17 and underwent PTA of right PT/AT arteries. Appreciate Vascular surgery assistance on case.   Concern with wound breakdown in last week and if free flap not successful on 9/26 then only other option may be bka and unfortunately waws not successful and will f/u ortho recs as now BKA is plan  -s/p free flap attempt with plastics and did attempt donor flap from abdomen/flank area and so has 2 drains from this area post op in place and vac as well from donor site even though did not take, and still in place 9/28 and plastics managing      Acute blood loss anemia  Received 2 U during plastic surgery flap attempt on 9/27 with stable hg post op  - Hgb fluctuating and own to 7.6 on 9/25 from 8.3 on 9/24. No clinical signs of bleeding and will monitor.   - Anemia is likely due to acute blood  loss which was from surgery. Most recent hemoglobin and hematocrit are listed below.  Recent Labs     09/25/24  0233 09/26/24  0426 09/26/24  1444 09/26/24 2015 09/26/24  2133 09/27/24  0449   HGB 7.6* 7.8*  --   --   --  8.7*   HCT 25.0* 24.5*   < > 18* 22* 25.5*    < > = values in this interval not displayed.       Plan  - Monitor serial CBC: Daily  - Transfuse PRBC if patient becomes hemodynamically unstable, symptomatic or H/H drops below 7/21.  - Patient has not received any PRBC transfusions to date  - Patient's anemia is currently stable and monitor. No indication for blood transfusion at this time.     Thrombocytosis  Improving with treatment of infection. Present on admit and related to infection causing increase in platelet count. Monitor daily CBC.       On pre-exposure prophylaxis for HIV  Patient on Truvada as outpatient but held in hospital due to elevated AST and ALT that has resolved. Plan to resume Truvada on discharge.     MSSA bacteremia  Bacteremia resolved.   - Blood cultures from admit grew MSSA. Repeat blood cultures done on 9/8 and 9/10 no growth.  - Infectious Disease consulted and patient placed on IV Oxacillin infusion as suspected source was right ankle surgical wound infection for bacteremia as grew MSSA from right ankle wound.   - Echo done without concern for vegetations.    Uncontrolled type 2 diabetes mellitus with hyperglycemia  Patient's FSGs controlled. Endocrine consulted and managing patient's diabetes in hospital and appreciate assistance. Patient on insulin pump at [facility] but not in hospital and on basal/prandial insulin in hospital as per Endocrine. Appreciate Endocrine assistance on this case.   Last A1c reviewed-   Lab Results   Component Value Date    LABA1C 10.8 (H) 08/15/2016    HGBA1C 8.5 (H) 09/06/2024     Most recent fingerstick glucose reviewed-   Recent Labs   Lab 09/24/24  1930 09/25/24  0733 09/25/24  1123 09/25/24  1542   POCTGLUCOSE 299* 260* 221* 107        Current correctional scale  Low  Maintain anti-hyperglycemic dose as follows-   Antihyperglycemics (From admission, onward)      Start     Stop Route Frequency Ordered    09/25/24 1000  insulin aspart U-100 pen 0-10 Units         -- SubQ Before meals & nightly PRN 09/25/24 0901    09/25/24 0900  insulin glargine U-100 (Lantus) pen 20 Units         -- SubQ Daily 09/25/24 0844    09/20/24 1130  insulin aspart U-100 pen 2-8 Units         -- SubQ 3 times daily with meals 09/20/24 0844           - Endocrine consulted:  - At this time, recommend that the patient remain off of his pump since he cannot be controlled in the Auto mode. Patient does not interact with pump frequently and relies on the auto mode algorithm.   - Insulin dosing as per Endocrine recommendations.   - Hold oral antihyperglycemics during hospitalization   - Monitor blood sugars with meals and at bedtime in hospital.  - Target blood sugars 140-180 in hospital.  - Diabetic diet.     Closed trimalleolar fracture of right ankle  S/p ORIF 07/2024  Ortho consulted   - see surgical wound breakdown above  NWB    Class 1 obesity due to excess calories with serious comorbidity and body mass index (BMI) of 34.0 to 34.9 in adult  Body mass index is 34.12 kg/m². Morbid obesity complicates all aspects of disease management from diagnostic modalities to treatment. Weight loss encouraged and health benefits explained to patient.         Gastroesophageal reflux disease without esophagitis  Controlled. Continue Carafate every 6 hours po to treat.       Metabolic acidosis  Bicarb 11 on 92/7 unclear reasons, will check lactic acid and resume na bicarb  Resolved. Discontinue sodium bicarbonate tablets on 9/22.   Related to DEEPALI. Patient started on sodium bicarbonate tablets and will continue. Monitor daily HCO3 levels with labs.   Lactate never drawn but improved to 18 now. Will titrate down na bicarb pending trends 9/28      Uncontrolled type 2 diabetes mellitus with  "diabetic polyneuropathy, with long-term current use of insulin  Patient's FSGs are uncontrolled due to hyperglycemia on current medication regimen.  Last A1c reviewed-   Lab Results   Component Value Date    LABA1C 10.8 (H) 08/15/2016    HGBA1C 9.9 (H) 07/18/2024     Most recent fingerstick glucose reviewed- No results for input(s): "POCTGLUCOSE" in the last 24 hours.  Current correctional scale  Low  Maintain anti-hyperglycemic dose as follows-   Antihyperglycemics (From admission, onward)      Start     Stop Route Frequency Ordered    09/06/24 0506  insulin aspart U-100 pen 0-5 Units         -- SubQ Every 6 hours PRN 09/06/24 0406          Hold Oral hypoglycemics while patient is in the hospital.    Anxiety  Had panic episodes and pulling at lines when he woke up in panic on 9/28 overnight. Start trazodone for sleep and has ativan prn if anxiety severe. Dc telemetry as has had no ectopy through 3+ weeks of admit and 1 less line to cause distress or panic        VTE Risk Mitigation (From admission, onward)           Ordered     enoxaparin injection 40 mg  Daily         09/18/24 0808     IP VTE HIGH RISK PATIENT  Once         09/06/24 1735                    Discharge Planning   JAYLEEN: 10/3/2024     Code Status: Full Code   Is the patient medically ready for discharge?:     Reason for patient still in hospital (select all that apply): Patient trending condition  Discharge Plan A: Home Health                  Maria Del Rosario Medina MD  Department of Hospital Medicine   [facility] - Surgery    "

## 2024-09-29 NOTE — SUBJECTIVE & OBJECTIVE
Interval History: reports he woke up last night and had some panic/confusion and started pulling at his lines/vac before he realized what he was doing. Asked for sleep med tonight and starting trazodone. Added ativan yest prn as having some anxiety/panic attacks at times with everything going on medically. His roommate hugo coming has been helping with this. He has been having him talk to other friends as having lots of people try to call to get updates form him has been overwhelming. The vac and the bandages on left flank/vac have been causing some irritation and so if the bandages that is right at left axilla beyond vac is causing chafing then can have nursing put tegrederm over it if needed. He's going to have hugo take him around lobby today for fresh air as he reports that helped his anxiety Friday. He is jsut rady to have the surgery even though isnt that option he wanted he's tired of dealing with the ankle and being in the hospital and is a step towards end of hospital stay. He's really hoping to go home but I told him that likely will need rehab as dont want to bump the stump or open it up if he's not used to transfers as it will al be different after the surgery and will need more PT/OT then to revluate. Dr velez talked to him yesterday about the surgery and plan for Tuesday. Plan to takl to ID Tuesday as once surgery done will be able to almost certainly stop iv antibiotics for source control as already compelted much more than 2 weeks for bacteremia and hardware will be gone so okay to do a midline as discussed with [facility] team that picc plans are going to be nixed after the bka given this      Review of Systems   Constitutional:  Negative for fever.   Respiratory:  Negative for shortness of breath.    Cardiovascular:  Negative for chest pain.   Gastrointestinal:  Negative for nausea and vomiting.   Musculoskeletal:  Positive for arthralgias (Right ankle).   Psychiatric/Behavioral:  Negative for  confusion.      Objective:     Vital Signs (Most Recent):  Temp: 97.5 °F (36.4 °C) (09/25/24 1540)  Pulse: 79 (09/25/24 1540)  Resp: 19 (09/25/24 1540)  BP: 158/76 (09/25/24 1540)  SpO2: 96 % (09/25/24 1540) on room air Vital Signs (24h Range):  Temp:  [97.8 °F (36.6 °C)-98.6 °F (37 °C)] 98.4 °F (36.9 °C)  Pulse:  [] 106  Resp:  [16-18] 17  SpO2:  [96 %-98 %] 96 %  BP: (143-180)/(63-79) 143/63     Weight: 93 kg (205 lb 0.4 oz)  Body mass index is 34.12 kg/m².    Intake/Output Summary (Last 24 hours) at 9/29/2024 1106  Last data filed at 9/29/2024 0843  Gross per 24 hour   Intake --   Output 1105 ml   Net -1105 ml         Physical Exam  Vitals and nursing note reviewed.   Constitutional:       General: He is awake. He is not in acute distress.     Appearance: Normal appearance. He is well-developed. He is obese.      Comments: Patient sitting up in bed and spirits improving   Eyes:      Conjunctiva/sclera: Conjunctivae normal.   Cardiovascular:      Rate and Rhythm: Normal rate and regular rhythm.      Heart sounds: Normal heart sounds. No murmur heard.  Pulmonary:      Effort: Pulmonary effort is normal. No respiratory distress.      Breath sounds: Normal breath sounds. No wheezing.   Abdominal:      General: Abdomen is flat. Bowel sounds are normal. There is no distension.      Palpations: Abdomen is soft.      Tenderness: There is no abdominal tenderness.      Comments: Vac to left back/flank and 2 drains post flap attempt donation   Musculoskeletal:      Left lower leg: No edema.      Comments: Wound vac in place to right ankle and ACE bandage overlying wound vac.    Skin:     General: Skin is warm.      Findings: No erythema.   Neurological:      Mental Status: He is alert and oriented to person, place, and time.   Psychiatric:         Mood and Affect: Mood normal.         Behavior: Behavior normal. Behavior is cooperative.         Thought Content: Thought content normal.         Judgment: Judgment normal.              Significant Labs: CBC:   Recent Labs   Lab 09/28/24  0359 09/29/24 0628   WBC 15.37* 17.12*   HGB 9.0* 8.1*   HCT 27.2* 24.7*    399     CMP:   Recent Labs   Lab 09/28/24 0359 09/29/24 0628    134*   K 3.7 3.9    108   CO2 18* 17*    160*   BUN 23 17   CREATININE 1.3 0.9   CALCIUM 7.9* 7.7*   PROT 5.4* 5.1*   ALBUMIN 1.8* 1.6*   BILITOT 0.3 0.3   ALKPHOS 133 142*   AST 16 17   ALT 6* 5*   ANIONGAP 10 9     Blood Sugars (AccuCheck):    Recent Labs     09/28/24  0646 09/28/24  1052 09/28/24  1543 09/28/24  2119 09/29/24  0710 09/29/24  0721   POCTGLUCOSE 165* 189* 169* 77 163* 150*       Significant Imaging: I have reviewed all pertinent imaging results/findings within the past 24 hours.

## 2024-09-29 NOTE — NURSING
Nurses Note -- 4 Eyes      9/28/2024   7:30 PM      Skin assessed during: Q Shift Change      [x] No Altered Skin Integrity Present    []Prevention Measures Documented      [] Yes- Altered Skin Integrity Present or Discovered   [] LDA Added if Not in Epic (Describe Wound)   [] New Altered Skin Integrity was Present on Admit and Documented in LDA   [] Wound Image Taken    Wound Care Consulted? No    Attending Nurse:  Camila     Second RN/Staff Member: Lovely

## 2024-09-29 NOTE — ASSESSMENT & PLAN NOTE
Closed trimalleolar fracture of right ankle s/p ORIF in 7/2024  Surgical site infection  63 yo male presenting with confusion and worsening redness, swelling and pain to right ankle. Patient with sepsis criteria on admit and right ankle wound felt to be source of infection.   - Ortho consulted and patient taken to OR 9/06/2024 for debridement of surgical wound with wound vac placed. Cultures taken and grew both MSSA and Group B streptococcus from wound. Patient taken back again to OR with Ortho with Dr. Liz and patient had another I&Dand wound vac change on 9/9/2024. Orthopedics took back to OR again on 9/20/2024 with Dr. Liz and underwent another I&D of right ankle and wound and replacement of wound vac. Patient taken back to OR with OR again on 9/23 with Dr. Liz and had another I&D and wound vac change with Orthopedics. Ortho noted anterolateral foot eschar developing. Distal lateral wound breakdown and ortho opened up majority of lateral wound and excised surrounding skin and subcutaneous tissue. Ortho removed all ankle hardware. Wound vacs placed medially and laterally.   - Orthopedics recommended Plastics evaluation for free flap and as part of evaluation recommended Vascular evaluation. Vascular evaluated patient and angiogram of right leg done and PTA done and revascularization of right PT/AT. Plastics plan propeller flap on 9/25/2024 but unfortunately due to presence of lateral and anterior wounds to right ankle no longer candidate for propeller flap so now will need free flap to cover both wounds. Plastics plans to take to OR on 9/26 for free flap to right ankle wounds. Plastics reports if unable to do free flap then patient's only option will be a BKA.  He understands and will f/u further 9/26 OR recs and was in OR for 8 hours with attemptes at targets at least 3 times but was not successful unfortunately and now will plan for BKA with ortho. Will almost certainly not need any antibiotics after  OR now given this, will reconfirm with ID tues/wed after BKA and will likely need IP rehab to recover and will plan for PM and R consult after surgery  -OR scheduled for 10/1 with dr velez  - Wound cultures -- MSSA and Group B strep from right ankle.   - Blood cultures -- MSSA on admit but repeat blood cultures negative.   - ID consulted and following and appreciate recs:  - Continue IV Oxacillin 12 g every 24 hours continuous infusion. Plan for orals if hardware retained once completed.  - Anticipate 6 weeks of IV antibiotics from date of most recent washout.  - Continue PT/OT and non weight bearing to right lower extremity as per Ortho. Continue wound vac as per Ortho to surgical site.  - Pain controlled and continue multimodals.   - Continue Lovenox 40 mg subcutaneous daily for DVT prophylaxis.

## 2024-09-30 ENCOUNTER — ANESTHESIA EVENT (OUTPATIENT)
Dept: SURGERY | Facility: HOSPITAL | Age: 64
DRG: 856 | End: 2024-09-30
Payer: COMMERCIAL

## 2024-09-30 LAB
ABO + RH BLD: NORMAL
ALBUMIN SERPL BCP-MCNC: 1.6 G/DL (ref 3.5–5.2)
ALP SERPL-CCNC: 141 U/L (ref 55–135)
ALT SERPL W/O P-5'-P-CCNC: 10 U/L (ref 10–44)
ANION GAP SERPL CALC-SCNC: 8 MMOL/L (ref 8–16)
AST SERPL-CCNC: 14 U/L (ref 10–40)
BILIRUB SERPL-MCNC: 0.3 MG/DL (ref 0.1–1)
BLD GP AB SCN CELLS X3 SERPL QL: NORMAL
BUN SERPL-MCNC: 14 MG/DL (ref 8–23)
CALCIUM SERPL-MCNC: 7.8 MG/DL (ref 8.7–10.5)
CHLORIDE SERPL-SCNC: 108 MMOL/L (ref 95–110)
CO2 SERPL-SCNC: 21 MMOL/L (ref 23–29)
CREAT SERPL-MCNC: 0.9 MG/DL (ref 0.5–1.4)
ERYTHROCYTE [DISTWIDTH] IN BLOOD BY AUTOMATED COUNT: 18 % (ref 11.5–14.5)
EST. GFR  (NO RACE VARIABLE): >60 ML/MIN/1.73 M^2
GLUCOSE SERPL-MCNC: 130 MG/DL (ref 70–110)
HCT VFR BLD AUTO: 24.5 % (ref 40–54)
HGB BLD-MCNC: 7.8 G/DL (ref 14–18)
MCH RBC QN AUTO: 28.3 PG (ref 27–31)
MCHC RBC AUTO-ENTMCNC: 31.8 G/DL (ref 32–36)
MCV RBC AUTO: 89 FL (ref 82–98)
PLATELET # BLD AUTO: 451 K/UL (ref 150–450)
PMV BLD AUTO: 10 FL (ref 9.2–12.9)
POCT GLUCOSE: 122 MG/DL (ref 70–110)
POCT GLUCOSE: 167 MG/DL (ref 70–110)
POCT GLUCOSE: 268 MG/DL (ref 70–110)
POCT GLUCOSE: 64 MG/DL (ref 70–110)
POCT GLUCOSE: 65 MG/DL (ref 70–110)
POCT GLUCOSE: 82 MG/DL (ref 70–110)
POTASSIUM SERPL-SCNC: 3.9 MMOL/L (ref 3.5–5.1)
PROT SERPL-MCNC: 5.2 G/DL (ref 6–8.4)
RBC # BLD AUTO: 2.76 M/UL (ref 4.6–6.2)
SODIUM SERPL-SCNC: 137 MMOL/L (ref 136–145)
SPECIMEN OUTDATE: NORMAL
WBC # BLD AUTO: 10.26 K/UL (ref 3.9–12.7)

## 2024-09-30 PROCEDURE — 25000003 PHARM REV CODE 250: Performed by: STUDENT IN AN ORGANIZED HEALTH CARE EDUCATION/TRAINING PROGRAM

## 2024-09-30 PROCEDURE — 36415 COLL VENOUS BLD VENIPUNCTURE: CPT | Performed by: HOSPITALIST

## 2024-09-30 PROCEDURE — 86850 RBC ANTIBODY SCREEN: CPT | Performed by: STUDENT IN AN ORGANIZED HEALTH CARE EDUCATION/TRAINING PROGRAM

## 2024-09-30 PROCEDURE — 25000003 PHARM REV CODE 250: Performed by: HOSPITALIST

## 2024-09-30 PROCEDURE — 63600175 PHARM REV CODE 636 W HCPCS: Performed by: STUDENT IN AN ORGANIZED HEALTH CARE EDUCATION/TRAINING PROGRAM

## 2024-09-30 PROCEDURE — 86920 COMPATIBILITY TEST SPIN: CPT | Performed by: STUDENT IN AN ORGANIZED HEALTH CARE EDUCATION/TRAINING PROGRAM

## 2024-09-30 PROCEDURE — 86900 BLOOD TYPING SEROLOGIC ABO: CPT | Performed by: STUDENT IN AN ORGANIZED HEALTH CARE EDUCATION/TRAINING PROGRAM

## 2024-09-30 PROCEDURE — 99232 SBSQ HOSP IP/OBS MODERATE 35: CPT | Mod: ,,,

## 2024-09-30 PROCEDURE — 80053 COMPREHEN METABOLIC PANEL: CPT | Performed by: HOSPITALIST

## 2024-09-30 PROCEDURE — 11000001 HC ACUTE MED/SURG PRIVATE ROOM

## 2024-09-30 PROCEDURE — 36415 COLL VENOUS BLD VENIPUNCTURE: CPT | Performed by: STUDENT IN AN ORGANIZED HEALTH CARE EDUCATION/TRAINING PROGRAM

## 2024-09-30 PROCEDURE — 85027 COMPLETE CBC AUTOMATED: CPT | Performed by: HOSPITALIST

## 2024-09-30 RX ORDER — HYDROCODONE BITARTRATE AND ACETAMINOPHEN 500; 5 MG/1; MG/1
TABLET ORAL
Status: DISCONTINUED | OUTPATIENT
Start: 2024-09-30 | End: 2024-10-04 | Stop reason: HOSPADM

## 2024-09-30 RX ORDER — INSULIN ASPART 100 [IU]/ML
5-8 INJECTION, SOLUTION INTRAVENOUS; SUBCUTANEOUS
Status: DISCONTINUED | OUTPATIENT
Start: 2024-09-30 | End: 2024-10-01

## 2024-09-30 RX ADMIN — INSULIN ASPART 5 UNITS: 100 INJECTION, SOLUTION INTRAVENOUS; SUBCUTANEOUS at 11:09

## 2024-09-30 RX ADMIN — SODIUM BICARBONATE 650 MG: 650 TABLET ORAL at 02:09

## 2024-09-30 RX ADMIN — INSULIN ASPART 4 UNITS: 100 INJECTION, SOLUTION INTRAVENOUS; SUBCUTANEOUS at 11:09

## 2024-09-30 RX ADMIN — HYDRALAZINE HYDROCHLORIDE 50 MG: 50 TABLET ORAL at 05:09

## 2024-09-30 RX ADMIN — INSULIN ASPART 5 UNITS: 100 INJECTION, SOLUTION INTRAVENOUS; SUBCUTANEOUS at 04:09

## 2024-09-30 RX ADMIN — CHOLECALCIFEROL TAB 25 MCG (1000 UNIT) 1000 UNITS: 25 TAB at 08:09

## 2024-09-30 RX ADMIN — SUCRALFATE 1 G: 1 SUSPENSION ORAL at 05:09

## 2024-09-30 RX ADMIN — INSULIN ASPART 4 UNITS: 100 INJECTION, SOLUTION INTRAVENOUS; SUBCUTANEOUS at 09:09

## 2024-09-30 RX ADMIN — AMLODIPINE BESYLATE 10 MG: 10 TABLET ORAL at 08:09

## 2024-09-30 RX ADMIN — INSULIN GLARGINE 20 UNITS: 100 INJECTION, SOLUTION SUBCUTANEOUS at 08:09

## 2024-09-30 RX ADMIN — SODIUM BICARBONATE 650 MG: 650 TABLET ORAL at 09:09

## 2024-09-30 RX ADMIN — LORAZEPAM 0.5 MG: 0.5 TABLET ORAL at 04:09

## 2024-09-30 RX ADMIN — METHOCARBAMOL 500 MG: 500 TABLET ORAL at 09:09

## 2024-09-30 RX ADMIN — ACETAMINOPHEN 1000 MG: 325 TABLET ORAL at 05:09

## 2024-09-30 RX ADMIN — SUCRALFATE 1 G: 1 SUSPENSION ORAL at 06:09

## 2024-09-30 RX ADMIN — ACETAMINOPHEN 1000 MG: 325 TABLET ORAL at 02:09

## 2024-09-30 RX ADMIN — HYDRALAZINE HYDROCHLORIDE 50 MG: 50 TABLET ORAL at 02:09

## 2024-09-30 RX ADMIN — HYDRALAZINE HYDROCHLORIDE 50 MG: 50 TABLET ORAL at 09:09

## 2024-09-30 RX ADMIN — MORPHINE SULFATE 2 MG: 2 INJECTION, SOLUTION INTRAMUSCULAR; INTRAVENOUS at 11:09

## 2024-09-30 RX ADMIN — SUCRALFATE 1 G: 1 SUSPENSION ORAL at 11:09

## 2024-09-30 RX ADMIN — OXACILLIN 12 G: 2 INJECTION, POWDER, FOR SOLUTION INTRAMUSCULAR; INTRAVENOUS at 12:09

## 2024-09-30 RX ADMIN — LOSARTAN POTASSIUM 50 MG: 25 TABLET, FILM COATED ORAL at 08:09

## 2024-09-30 RX ADMIN — MORPHINE SULFATE 4 MG: 4 INJECTION INTRAVENOUS at 04:09

## 2024-09-30 RX ADMIN — PANTOPRAZOLE SODIUM 40 MG: 40 TABLET, DELAYED RELEASE ORAL at 08:09

## 2024-09-30 RX ADMIN — TRAZODONE HYDROCHLORIDE 50 MG: 50 TABLET ORAL at 09:09

## 2024-09-30 RX ADMIN — SODIUM BICARBONATE 650 MG: 650 TABLET ORAL at 08:09

## 2024-09-30 RX ADMIN — METHOCARBAMOL 500 MG: 500 TABLET ORAL at 04:09

## 2024-09-30 RX ADMIN — NORTRIPTYLINE HYDROCHLORIDE 50 MG: 25 CAPSULE ORAL at 09:09

## 2024-09-30 RX ADMIN — ACETAMINOPHEN 1000 MG: 325 TABLET ORAL at 09:09

## 2024-09-30 NOTE — SUBJECTIVE & OBJECTIVE
Interval History: feeling okay so far this morning with roommate hugo at bedside. Scheduled for OR at 7 am tomorrow as asked for timing so hugo will know if he [facility] [place] or closer to hospital overnight tonight. Plastics to check on donor site with vac to see if needs I and D while in OR tomorrow as well. ID reviewing case again, and they report that will f/u with staff as he didn't get ROBERTA with 2 days of bacteremia and were treating just in case of any endocarditis concerns as they knew hed be on 6 weeks for bone infection already so they're going to make sure staff doesn't want to pursue a ROBERTA before stopping antibitoics after the BKA. He wasn't persistanetly bacteremic or have multiple sites of seeding so seems very unlikely but will f/u ID tere I told him above discussion. NPO at MN for OR tomorrow. Plan for PM and R consult PT/OT reconsult post op as suspect will likely need rehab for post BKA thearpiest for higher intensity therapy and he's amenable as went to [facility] rehab previously.       Review of Systems   Constitutional:  Negative for fever.   Respiratory:  Negative for shortness of breath.    Cardiovascular:  Negative for chest pain.   Gastrointestinal:  Negative for nausea and vomiting.   Musculoskeletal:  Positive for arthralgias (Right ankle).   Psychiatric/Behavioral:  Negative for confusion.      Objective:     Vital Signs (Most Recent):  Temp: 97.5 °F (36.4 °C) (09/25/24 1540)  Pulse: 79 (09/25/24 1540)  Resp: 19 (09/25/24 1540)  BP: 158/76 (09/25/24 1540)  SpO2: 96 % (09/25/24 1540) on room air Vital Signs (24h Range):  Temp:  [97.7 °F (36.5 °C)-98.8 °F (37.1 °C)] 98.7 °F (37.1 °C)  Pulse:  [] 99  Resp:  [17-18] 18  SpO2:  [95 %-97 %] 95 %  BP: (128-171)/(60-75) 128/60     Weight: 93 kg (205 lb 0.4 oz)  Body mass index is 34.12 kg/m².    Intake/Output Summary (Last 24 hours) at 9/30/2024 1003  Last data filed at 9/30/2024 0549  Gross per 24 hour   Intake --   Output 976  ml   Net -975 ml         Physical Exam  Vitals and nursing note reviewed.   Constitutional:       General: He is awake. He is not in acute distress.     Appearance: Normal appearance. He is well-developed. He is obese.      Comments: Patient sitting up in bed and spirits improving   Eyes:      Conjunctiva/sclera: Conjunctivae normal.   Cardiovascular:      Rate and Rhythm: Normal rate and regular rhythm.      Heart sounds: Normal heart sounds. No murmur heard.  Pulmonary:      Effort: Pulmonary effort is normal. No respiratory distress.      Breath sounds: Normal breath sounds. No wheezing.   Abdominal:      General: Abdomen is flat. Bowel sounds are normal. There is no distension.      Palpations: Abdomen is soft.      Tenderness: There is no abdominal tenderness.      Comments: Vac to left back/flank and 2 drains post flap attempt donation   Musculoskeletal:      Left lower leg: No edema.      Comments: Wound vac in place to right ankle and ACE bandage overlying wound vac.    Skin:     General: Skin is warm.      Findings: No erythema.   Neurological:      Mental Status: He is alert and oriented to person, place, and time.   Psychiatric:         Mood and Affect: Mood normal.         Behavior: Behavior normal. Behavior is cooperative.         Thought Content: Thought content normal.         Judgment: Judgment normal.             Significant Labs: CBC:   Recent Labs   Lab 09/29/24  0628 09/30/24  0434   WBC 17.12* 10.26   HGB 8.1* 7.8*   HCT 24.7* 24.5*    451*     CMP:   Recent Labs   Lab 09/29/24  0628 09/30/24  0435   * 137   K 3.9 3.9    108   CO2 17* 21*   * 130*   BUN 17 14   CREATININE 0.9 0.9   CALCIUM 7.7* 7.8*   PROT 5.1* 5.2*   ALBUMIN 1.6* 1.6*   BILITOT 0.3 0.3   ALKPHOS 142* 141*   AST 17 14   ALT 5* 10   ANIONGAP 9 8     Blood Sugars (AccuCheck):    Recent Labs     09/29/24  0710 09/29/24  0721 09/29/24  1107 09/29/24  1628 09/29/24  2104 09/30/24  0707   POCTGLUCOSE 163*  150* 186* 321* 140* 167*       Significant Imaging: I have reviewed all pertinent imaging results/findings within the past 24 hours.

## 2024-09-30 NOTE — ASSESSMENT & PLAN NOTE
64 year old with poorly controlled DM (A1C 8.5), recent fall resulting in left wrist and right ankle fractures 07/18/24 s/p ex fix Rt ankle and ORIF left wrist 07/19, with subsequent ORIF right ankle on 7/26/24 with Dr. Liz,  c/b MSSA deep surgical site infection of right ankle involving underlying hardware with associated MSSA bacteremia,  s/p I&D on 09/06 and 9/9.  Surgical cxs +GBS, MSSA.   On 09/17 underwent angiogram with PTA and wound vac exchange. Further I&D with wound vac exchange 09/20 and 09/23 with removal of all hardware.  Attempted parascapular flap by Plastic Surgery 9/26 with no inflow flap aborted.  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     Blood cultures 9/6 and 9/7 + for MSSA.  Blood cultures cleared 9/8.  TTE negative.      Pt maintained on IV oxacillin.  Afebrile, no leukocytosis.  Plan for discharge to [facility].     Recommendations / Plan:  Continue IV oxacillin 12 g q 24 hours continuous infusion until 9/20.  This will complete 6 weeks of IV antibiotics from first negative blood culture - a sufficient duration to treat endocarditis if present.  No need for ROBERTA.  This will also complete the 14 days of post-BKA antibiotics recommended by Orthopedics.    Recommend placement after hosp d/c for PT/OT, ADL assistance, wound care and IV abx - patient to d/c to [facility]  Accepting facility to perform weekly labs (cbc, cmp, crp), and PICC line dressing changes.  Facility to fax results to [facility] Fax Number: [phone].   ID will schedule pt for ID clinic f/u appt after completion of Rehab.   If ID appt not seen under patient's appointments tab, please call ID dept to schedule appt before pt discharge.  Will sign off.  Please call with questions or re-consult as needed.      Discussed with ID staff, Dr. Randhawa and Primary Team, Dr. Medina

## 2024-09-30 NOTE — ASSESSMENT & PLAN NOTE
Closed trimalleolar fracture of right ankle s/p ORIF in 7/2024  Surgical site infection  63 yo male presenting with confusion and worsening redness, swelling and pain to right ankle. Patient with sepsis criteria on admit and right ankle wound felt to be source of infection.   - Ortho consulted and patient taken to OR 9/06/2024 for debridement of surgical wound with wound vac placed. Cultures taken and grew both MSSA and Group B streptococcus from wound. Patient taken back again to OR with Ortho with Dr. Liz and patient had another I&Dand wound vac change on 9/9/2024. Orthopedics took back to OR again on 9/20/2024 with Dr. Liz and underwent another I&D of right ankle and wound and replacement of wound vac. Patient taken back to OR with OR again on 9/23 with Dr. Liz and had another I&D and wound vac change with Orthopedics. Ortho noted anterolateral foot eschar developing. Distal lateral wound breakdown and ortho opened up majority of lateral wound and excised surrounding skin and subcutaneous tissue. Ortho removed all ankle hardware. Wound vacs placed medially and laterally.   - Orthopedics recommended Plastics evaluation for free flap and as part of evaluation recommended Vascular evaluation. Vascular evaluated patient and angiogram of right leg done and PTA done and revascularization of right PT/AT. Plastics plan propeller flap on 9/25/2024 but unfortunately due to presence of lateral and anterior wounds to right ankle no longer candidate for propeller flap so now will need free flap to cover both wounds. Plastics plans to take to OR on 9/26 for free flap to right ankle wounds. Plastics reports if unable to do free flap then patient's only option will be a BKA.  He understands and will f/u further 9/26 OR recs and was in OR for 8 hours with attemptes at targets at least 3 times but was not successful unfortunately and now will plan for BKA with ortho.   Discussed with ID- Andrew Kearns- would cont 24  hours after surgery oxacillin now, and is checking with staff to see if owuld need to consider a ROBERTA to confirm no endocardits before stopping antibiotics as was covering for 6 weeks for osteo that would cover any presumed endocarditis in case had any infxn that wasn't seen on TTE and will f/u recs.  will likely need IP rehab to recover and will plan for PM and R consult after surgery and repeat PT/OT consult  -OR scheduled for 10/1 with dr velez  - Wound cultures -- MSSA and Group B strep from right ankle.   - Blood cultures -- MSSA on admit but repeat blood cultures negative.   - ID consulted and following and appreciate recs:  - Continue IV Oxacillin 12 g every 24 hours continuous infusion. Plan for orals if hardware retained once completed.  - Anticipate 6 weeks of IV antibiotics from date of most recent washout.  - Continue PT/OT and non weight bearing to right lower extremity as per Ortho. Continue wound vac as per Ortho to surgical site.  - Pain controlled and continue multimodals.   - Continue Lovenox 40 mg subcutaneous daily for DVT prophylaxis.

## 2024-09-30 NOTE — PROGRESS NOTES
[facility]  Orthopedics  Progress Note    Patient Name: Cortes Schroeder  MRN: [id number]  Admission Date: 9/6/2024  Hospital Length of Stay: 24 days  Attending Provider: Maria Del Rosario Medina MD  Primary Care Provider: Andrew Sinclair Jr., MD  Follow-up For: Procedure(s) (LRB):  CREATION, FREE FLAP (Right)    Post-Operative Day: 4 Days Post-Op  Subjective:     Principal Problem:Surgical wound breakdown    Principal Orthopedic Problem: s/p I&D and wound vac placement on 09/06, 09/20     Interval History: Reiterated plan for BKA tomorrow. Patient is agreeable and all questions were answered. Hb 7.8. We will hold 1U of blood to be transfused tomorrow pre op.       Review of patient's allergies indicates:   Allergen Reactions    Invokana [canagliflozin] Anaphylaxis    Percocet [oxycodone-acetaminophen] Nausea Only and Hallucinations    Biaxin [clarithromycin]     Hydrocodone Other (See Comments)     Dizzy/nausea/hallucinations    Sulfa (sulfonamide antibiotics) Nausea Only and Rash       Current Facility-Administered Medications   Medication    0.9%  NaCl infusion (for blood administration)    0.9%  NaCl infusion (for blood administration)    acetaminophen tablet 1,000 mg    albuterol-ipratropium 2.5 mg-0.5 mg/3 mL nebulizer solution 3 mL    amLODIPine tablet 10 mg    calcium carbonate 200 mg calcium (500 mg) chewable tablet 1,000 mg    dextrose 10% bolus 125 mL 125 mL    dextrose 10% bolus 125 mL 125 mL    dextrose 10% bolus 125 mL 125 mL    dextrose 10% bolus 250 mL 250 mL    dextrose 10% bolus 250 mL 250 mL    dextrose 10% bolus 250 mL 250 mL    enoxaparin injection 40 mg    glucagon (human recombinant) injection 1 mg    glucagon (human recombinant) injection 1 mg    glucose chewable tablet 16 g    glucose chewable tablet 24 g    hydrALAZINE tablet 50 mg    hydrALAZINE tablet 50 mg    insulin aspart U-100 pen 0-10 Units    insulin aspart U-100 pen 5-8 Units    insulin glargine U-100 (Lantus) pen 20 Units     "LORazepam tablet 0.5 mg    losartan tablet 50 mg    methocarbamoL tablet 500 mg    morphine injection 2 mg    morphine injection 4 mg    nortriptyline capsule 50 mg    ondansetron disintegrating tablet 8 mg    oxacillin 12 g in  mL CONTINUOUS INFUSION    pantoprazole EC tablet 40 mg    polyethylene glycol packet 17 g    prochlorperazine injection Soln 5 mg    senna tablet 8.6 mg    sodium bicarbonate tablet 650 mg    sucralfate 100 mg/mL suspension 1 g    traZODone tablet 50 mg    vitamin D 1000 units tablet 1,000 Units     Objective:     Vital Signs (Most Recent):  Temp: 97.3 °F (36.3 °C) (09/30/24 1054)  Pulse: 98 (09/30/24 1054)  Resp: 18 (09/30/24 1054)  BP: (!) 145/70 (09/30/24 1054)  SpO2: 95 % (09/30/24 1054) Vital Signs (24h Range):  Temp:  [97.3 °F (36.3 °C)-98.8 °F (37.1 °C)] 97.3 °F (36.3 °C)  Pulse:  [] 98  Resp:  [17-18] 18  SpO2:  [95 %-97 %] 95 %  BP: (128-171)/(60-75) 145/70     Weight: 93 kg (205 lb 0.4 oz)  Height: 5' 5" (165.1 cm)  Body mass index is 34.12 kg/m².      Intake/Output Summary (Last 24 hours) at 9/30/2024 1301  Last data filed at 9/30/2024 0549  Gross per 24 hour   Intake --   Output 975 ml   Net -975 ml          Ortho/SPM Exam     AAOx4  NAD  Tachycardic  No increased WOB    RLE  Medial wound vac to good suction  Lateral side with fibrinous exudate and weeping ecchymosis distally  Compartments soft/compressible  Sensation diminished (at baseline)   Active toe dorsiflexion & plantarflexion  WWP. Brisk cap refill      Significant Labs:     Recent Labs   Lab 09/30/24  0434   WBC 10.26   RBC 2.76*   HGB 7.8*   HCT 24.5*   *   MCV 89   MCH 28.3   MCHC 31.8*         Significant Imaging: I have reviewed and interpreted all pertinent imaging results/findings.    Assessment/Plan:     * Surgical wound breakdown  Cortes Schroeder is a 64 y.o. male with PMH of DM, HTN  and right trimalleolar ankle fracture status post ex fix on 07/18 with subsequent definitive fixation on " 07/26 admitted with right ankle wound dehiscence in the setting of uncontrolled diabetes.      He is s/p I&D of R ankle 09/06. Repeat I&D R medial ankle 09/09. 9/17 angiography and medial ankle wound vac exchange.   To OR 9/20 and 9/23 for repeat I&D and vac exchange. Hardware removed on 9/23.   Free flap aborted 9/26 due to poor vascular inflow. Plan is for right BKA 10/1. \  NPO at midnight.     Pain control: multimodal regimen  PT/OT:  NWB RLE   DVT PPx: Lvx - hold today in prep for OR  Abx:  oxacillin continuous; ID following   Cultures:  ankle cx staph +    Dispo: plan for BKA tomorrow           Artur Galvan MD  Orthopedics  [facility] - Surgery

## 2024-09-30 NOTE — NURSING
Nurses Note -- 4 Eyes      9/29/2024   7:03 PM      Skin assessed during: Daily Assessment      [x] No Altered Skin Integrity Present    []Prevention Measures Documented      [] Yes- Altered Skin Integrity Present or Discovered   [] LDA Added if Not in Epic (Describe Wound)   [] New Altered Skin Integrity was Present on Admit and Documented in LDA   [] Wound Image Taken    Wound Care Consulted? No    Attending Nurse:  Camila     Second RN/Staff Member:  Jeni

## 2024-09-30 NOTE — SUBJECTIVE & OBJECTIVE
"Interval HPI:   No acute events overnight. Patient in room 542/542 A. Blood glucose variable. BG at and above goal on current insulin regimen (SSI, prandial, and basal insulin ). Steroid use- None.     Of note, patient with episodes of BG excursions due to going downstairs and eating high carb/sugar foods despite being discouraged to do so.      Renal function-   Lab Results   Component Value Date    CREATININE 0.9 2024          Vasopressors-  None      Diet diabetic  Calorie      Eatin%  Nausea: No  Hypoglycemia and intervention: No  Fever: No  TPN and/or TF: No    /60 (BP Location: Left arm, Patient Position: Lying)   Pulse 99   Temp 98.7 °F (37.1 °C) (Oral)   Resp 18   Ht 5' 5" (1.651 m)   Wt 93 kg (205 lb 0.4 oz)   SpO2 95%   BMI 34.12 kg/m²     Labs Reviewed and Include    Recent Labs   Lab 24  0435   *   CALCIUM 7.8*   ALBUMIN 1.6*   PROT 5.2*      K 3.9   CO2 21*      BUN 14   CREATININE 0.9   ALKPHOS 141*   ALT 10   AST 14   BILITOT 0.3     Lab Results   Component Value Date    WBC 10.26 2024    HGB 7.8 (L) 2024    HCT 24.5 (L) 2024    MCV 89 2024     (H) 2024     No results for input(s): "TSH", "FREET4" in the last 168 hours.  Lab Results   Component Value Date    HGBA1C 8.5 (H) 2024       Nutritional status:   Body mass index is 34.12 kg/m².  Lab Results   Component Value Date    ALBUMIN 1.6 (L) 2024    ALBUMIN 1.6 (L) 2024    ALBUMIN 1.8 (L) 2024     Lab Results   Component Value Date    PREALBUMIN 3 (L) 2024    PREALBUMIN 13 (L) 2024       Estimated Creatinine Clearance: 88.1 mL/min (based on SCr of 0.9 mg/dL).    Accu-Checks  Recent Labs     24  0646 24  1052 24  1543 24  2119 24  0710 24  0721 24  1107 24  1628 24  2104 24  0707   POCTGLUCOSE 165* 189* 169* 77 163* 150* 186* 321* 140* 167*       Current Medications " and/or Treatments Impacting Glycemic Control  Immunotherapy:    Immunosuppressants       None          Steroids:   Hormones (From admission, onward)      None          Pressors:    Autonomic Drugs (From admission, onward)      None          Hyperglycemia/Diabetes Medications:   Antihyperglycemics (From admission, onward)      Start     Stop Route Frequency Ordered    09/27/24 1013  insulin aspart U-100 pen 0-10 Units         -- SubQ Before meals & nightly PRN 09/27/24 0913    09/27/24 0900  insulin glargine U-100 (Lantus) pen 20 Units         -- SubQ Daily 09/27/24 0752    09/26/24 1645  insulin aspart U-100 pen 4-8 Units         -- SubQ 3 times daily with meals 09/26/24 1527

## 2024-09-30 NOTE — PLAN OF CARE
Problem: Adult Inpatient Plan of Care  Goal: Absence of Hospital-Acquired Illness or Injury  Outcome: Progressing     Problem: Adult Inpatient Plan of Care  Goal: Optimal Comfort and Wellbeing  Outcome: Progressing     Problem: Adult Inpatient Plan of Care  Goal: Patient-Specific Goal (Individualized)  Outcome: Progressing     Problem: Fall Injury Risk  Goal: Absence of Fall and Fall-Related Injury  Outcome: Progressing     Problem: Sepsis/Septic Shock  Goal: Blood Glucose Level Within Targeted Range  Outcome: Progressing     Problem: Sepsis/Septic Shock  Goal: Optimal Coping  Outcome: Progressing     Problem: Wound  Goal: Optimal Coping  Outcome: Progressing    Patient lying supine in bed watching television. NAD noted. Safety measures in place.

## 2024-09-30 NOTE — PROGRESS NOTES
[facility]  Endocrinology  Progress Note    Admit Date: 2024     Reason for Consult: Management of T2DM, Hyperglycemia      Surgical Procedure and Date: S/P irrigation debridement of RLE on 2024    Diabetes diagnosis year:      Home Diabetes Medications:    Humalog via OmniPod insulin pump  Mounjaro 10 mg weekly     Current pump settings:  Basal 12 am to 2.3 and 6 am to 1.55  ICR to 3 at 12 am, 2 at 4 pm  ISF to 1:20   AIT 3 hrs  Target 110-120        Patient had anaphylaxis reaction to SGLT2 inhibitors specifically Invokana     How often checking glucose at home? Dexcom G6   BG readings on regimen: Average 210's per Dexcom  Hypoglycemia on the regimen?  Yes  Missed doses on regimen?  No     Diabetes Complications include:     Hyperglycemia and Diabetic peripheral neuropathy         Complicating diabetes co morbidities:   HLD, HTN, Obesity         HPI: 64 y.o. male presents to the ED w/ complaint of altered mental status. Hx of R ankle fracture with surgery over a month ago. Patient now presents with sepsis 2/2 R ankle infection s/p ORIF on . Started on IV vanc and cefepime. Given IVFs. Blood cultures pending. Brought to OR with ortho on  for irrigation debridement of RLE. Endocrine following for BG and type 2 diabetes.     Lab Results   Component Value Date    LABA1C 10.8 (H) 08/15/2016    HGBA1C 8.5 (H) 2024         Interval HPI:   No acute events overnight. Patient in room 542/542 A. Blood glucose variable. BG at and above goal on current insulin regimen (SSI, prandial, and basal insulin ). Steroid use- None.     Of note, patient with episodes of BG excursions due to going downstairs and eating high carb/sugar foods despite being discouraged to do so.      Renal function-   Lab Results   Component Value Date    CREATININE 0.9 2024          Vasopressors-  None      Diet diabetic  Calorie      Eatin%  Nausea: No  Hypoglycemia and intervention: No  Fever:  "No  TPN and/or TF: No    /60 (BP Location: Left arm, Patient Position: Lying)   Pulse 99   Temp 98.7 °F (37.1 °C) (Oral)   Resp 18   Ht 5' 5" (1.651 m)   Wt 93 kg (205 lb 0.4 oz)   SpO2 95%   BMI 34.12 kg/m²     Labs Reviewed and Include    Recent Labs   Lab 09/30/24  0435   *   CALCIUM 7.8*   ALBUMIN 1.6*   PROT 5.2*      K 3.9   CO2 21*      BUN 14   CREATININE 0.9   ALKPHOS 141*   ALT 10   AST 14   BILITOT 0.3     Lab Results   Component Value Date    WBC 10.26 09/30/2024    HGB 7.8 (L) 09/30/2024    HCT 24.5 (L) 09/30/2024    MCV 89 09/30/2024     (H) 09/30/2024     No results for input(s): "TSH", "FREET4" in the last 168 hours.  Lab Results   Component Value Date    HGBA1C 8.5 (H) 09/06/2024       Nutritional status:   Body mass index is 34.12 kg/m².  Lab Results   Component Value Date    ALBUMIN 1.6 (L) 09/30/2024    ALBUMIN 1.6 (L) 09/29/2024    ALBUMIN 1.8 (L) 09/28/2024     Lab Results   Component Value Date    PREALBUMIN 3 (L) 09/06/2024    PREALBUMIN 13 (L) 07/18/2024       Estimated Creatinine Clearance: 88.1 mL/min (based on SCr of 0.9 mg/dL).    Accu-Checks  Recent Labs     09/28/24  0646 09/28/24  1052 09/28/24  1543 09/28/24  2119 09/29/24  0710 09/29/24  0721 09/29/24  1107 09/29/24  1628 09/29/24  2104 09/30/24  0707   POCTGLUCOSE 165* 189* 169* 77 163* 150* 186* 321* 140* 167*       Current Medications and/or Treatments Impacting Glycemic Control  Immunotherapy:    Immunosuppressants       None          Steroids:   Hormones (From admission, onward)      None          Pressors:    Autonomic Drugs (From admission, onward)      None          Hyperglycemia/Diabetes Medications:   Antihyperglycemics (From admission, onward)      Start     Stop Route Frequency Ordered    09/27/24 1013  insulin aspart U-100 pen 0-10 Units         -- SubQ Before meals & nightly PRN 09/27/24 0913    09/27/24 0900  insulin glargine U-100 (Lantus) pen 20 Units         -- SubQ Daily " 09/27/24 0752    09/26/24 1645  insulin aspart U-100 pen 4-8 Units         -- SubQ 3 times daily with meals 09/26/24 1527            ASSESSMENT and PLAN    Cardiac/Vascular  Hyperlipidemia  On statin per ADA guidelines       Endocrine  Uncontrolled type 2 diabetes mellitus with hyperglycemia  BG goal 140-180    - Lantus (Insulin Glargine) 20 units daily   - Novolog 4-8 units TIDWM (Administer    4   units if patient eats 25-50% of meal, administer    8    units if patient eats > 50% of meal.)  - [facility] SSI (150/25)  - BG checks AC/HS  - Hypoglycemia protocol in place    ** Please notify Endocrine for any change and/or advance in diet**  ** Please call Endocrine for any BG related issues **    Discharge Planning:   TBD. Please notify endocrinology prior to discharge.        Orthopedic  * Surgical wound breakdown  Optimize BG control to improve wound healing  Managed per primary team              Payton Vora PA-C  Endocrinology  [facility]

## 2024-09-30 NOTE — PROGRESS NOTES
[facility] Medicine  Progress Note    Patient Name: Cortes Schroeder  MRN: [id number]  Patient Class: IP- Inpatient   Admission Date: 9/6/2024  Length of Stay: 24 days  Attending Physician: Maria Del Rosario Medina MD  Primary Care Provider: Andrew Sinclair Jr., MD        Subjective:     Principal Problem:Surgical wound breakdown        HPI:  Cortes Schroeder is a 64 y.o. M with PMHx of anxiety, T2DM, GERD, HLD, HTN who presented to ED for AMS. He had a fall in July that resulted in R trimalleolar fracture and L distal radius fracture. He had external fixation on 07/18 and then ORIF on 07/26 with Dr. Liz. He was prescribed clinda 300 mg on 08/28 for a ten day course. Patient was doing well when he acutely became altered and not acting like himself a few hours ago. Patient family member drove him here from [facility] for ortho consult. R medial ankle wound dehisced with redness and swelling around it. No CPM fever, cough, N/V/D or seizure.    In ED: T max 99.1. SIRS 2/4 with WBC 18 and . CMP notable for Na 127, Cr 1.7, . Phos 1.9. .3. BNP 73. Trop neg. A1c 8.5. R tib fib XR notes There are 2 abandoned anterior-posteriorly oriented pin tracks in the right mid tibial shaft.  On AP views, each of these pin tracks demonstrates a small faint internal density, possibly representing a sequestrum. Ortho and endocrine consulted. Given IV vanc and IV clindamycin. Given 2.7L IVFs. Admitted to hospital medicine for sepsis 2/2 infected hardware.     Overview/Hospital Course:  Cortes Schroeder is a 65 y/o male admitted to hospital medicine for sepsis related to right ankle from surgical wound infection in patient with recent ORIF of right ankle fracture done on 7/26/2024. Patient started on empiric IV Vancomycin and IV Cefepime and given IVF's as per sepsis protocol. Orthopedics consulted and patient taken to OR on 9/6/2024 and had irrigation debridement of right surgical incision and  placement of wound vac.Endocrine consulted to assist in management of his diabetes while in hospital. Blood cultures from admit returned with MSSA. Infectious Disease consulted. Wound culture returned positive for Group B streptococcus and MSSA. Echo done due to bacteremia without concern for vegetations. ID changed antibiotics to IV Oxacillin. Patient with new cough and worsening WBC. CTA chest negative for PE but notes small area of ground glass changes to RUL. Patient placed on 5 day course of po Doxycycline to treat as per ID and completed for possible pneumonia. Patient taken back to OR on 09/09/2024 with Ortho and had another irrigation and debridement and replacement of wound vac to right ankle. Plastics consulted for flap evaluation and recommended vascular evaluation. CTA right lower extremity done and showed moderate atherosclerosis and multifocal high grade stenosis throughout right calf arteries. Dietary consulted for malnutrition and started on Boost supplementation to maximize his nutrition for a flap and wound healing. Vascular surgery consulted per Plastics recs as they would like to pre-optimize blood flow prior to any free flap or pedicled propellar flap. Vascular recommended angiogram of right leg but patient developed DEEPALI. Nephrology consulted and suspected ATN related to contrast nephropathy. Patient placed on supportive care. DEEPALI resolved. Patyoanetn taken for unilateral angiogram by Vascular on 9/17 and underwent PTA of right posterior tibial artery and right anterior tibial artery. After revascularization of right leg pl;an to do flap but working on timing with Ortho and Plastics. Patient to go back to OR again on 9/20 for another irrigation and debridement and replacement of wound vac to right ankle wound by Ortho. Plastics plans propeller flap to right ankle on 9/25 and then will need to remain in hospital for 1 week after flap for dangling protocol and monitoring of flap per Plastics. Repeat  blood cultures from 9/8 and 9/10 no growth. Patient taken back to OR on 9/20 with Dr. Moe Liz and had another I&D of right ankle and wound and replacement of wound vac. Patient to remain non weight bearing post-op and Plastics plans flap placement while in hospital on 9/25. Patient to go back to OR on for another I&D and wound vac change on 9/23. Patient taken back to OR on 9/23 with Dr. Liz and had another I&D and wound vac change with Orthopedics. Ortho noted anterolateral foot eschar developing. Distal lateral wound breakdown and ortho opened up majority of lateral wound and excised surrounding skin and subcutaneous tissue. Ortho removed all ankle hardware. Wound vacs placed medially and laterally. Patient re-evaluated by Plastics on 9/25 and state due to lateral wound in addition to anterior wound no longer a candidate for propellar flap and plastic will need to do free flap to cover both wounds. Plastics concerned if no adequate vascular targets, will be unable to cover and BKA is only option. Patient not very happy. Patient to go to OR On 9/26 for free flap.     Interval History: feeling okay so far this morning with roommate hugo at bedside. Scheduled for OR at 7 am tomorrow as asked for timing so hugo will know if he should stay at [facility] or closer to hospital overnight tonight. Plastics to check on donor site with vac to see if needs I and D while in OR tomorrow as well. ID reviewing case again, and they report that will f/u with staff as he didn't get ROBERTA with 2 days of bacteremia and were treating just in case of any endocarditis concerns as they knew hed be on 6 weeks for bone infection already so they're going to make sure staff doesn't want to pursue a ROBERTA before stopping antibitoics after the BKA. He wasn't persistanetly bacteremic or have multiple sites of seeding so seems very unlikely but will f/u MINERVA carranza I told him above discussion. NPO at MN for OR tomorrow. Plan for PM  and R consult PT/OT reconsult post op as suspect will likely need rehab for post BKA thearpiest for higher intensity therapy and he's amenable as went to O rehab previously.       Review of Systems   Constitutional:  Negative for fever.   Respiratory:  Negative for shortness of breath.    Cardiovascular:  Negative for chest pain.   Gastrointestinal:  Negative for nausea and vomiting.   Musculoskeletal:  Positive for arthralgias (Right ankle).   Psychiatric/Behavioral:  Negative for confusion.      Objective:     Vital Signs (Most Recent):  Temp: 97.5 °F (36.4 °C) (09/25/24 1540)  Pulse: 79 (09/25/24 1540)  Resp: 19 (09/25/24 1540)  BP: 158/76 (09/25/24 1540)  SpO2: 96 % (09/25/24 1540) on room air Vital Signs (24h Range):  Temp:  [97.7 °F (36.5 °C)-98.8 °F (37.1 °C)] 98.7 °F (37.1 °C)  Pulse:  [] 99  Resp:  [17-18] 18  SpO2:  [95 %-97 %] 95 %  BP: (128-171)/(60-75) 128/60     Weight: 93 kg (205 lb 0.4 oz)  Body mass index is 34.12 kg/m².    Intake/Output Summary (Last 24 hours) at 9/30/2024 1003  Last data filed at 9/30/2024 0549  Gross per 24 hour   Intake --   Output 975 ml   Net -975 ml         Physical Exam  Vitals and nursing note reviewed.   Constitutional:       General: He is awake. He is not in acute distress.     Appearance: Normal appearance. He is well-developed. He is obese.      Comments: Patient sitting up in bed and spirits improving   Eyes:      Conjunctiva/sclera: Conjunctivae normal.   Cardiovascular:      Rate and Rhythm: Normal rate and regular rhythm.      Heart sounds: Normal heart sounds. No murmur heard.  Pulmonary:      Effort: Pulmonary effort is normal. No respiratory distress.      Breath sounds: Normal breath sounds. No wheezing.   Abdominal:      General: Abdomen is flat. Bowel sounds are normal. There is no distension.      Palpations: Abdomen is soft.      Tenderness: There is no abdominal tenderness.      Comments: Vac to left back/flank and 2 drains post flap attempt donation    Musculoskeletal:      Left lower leg: No edema.      Comments: Wound vac in place to right ankle and ACE bandage overlying wound vac.    Skin:     General: Skin is warm.      Findings: No erythema.   Neurological:      Mental Status: He is alert and oriented to person, place, and time.   Psychiatric:         Mood and Affect: Mood normal.         Behavior: Behavior normal. Behavior is cooperative.         Thought Content: Thought content normal.         Judgment: Judgment normal.             Significant Labs: CBC:   Recent Labs   Lab 09/29/24 0628 09/30/24  0434   WBC 17.12* 10.26   HGB 8.1* 7.8*   HCT 24.7* 24.5*    451*     CMP:   Recent Labs   Lab 09/29/24 0628 09/30/24  0435   * 137   K 3.9 3.9    108   CO2 17* 21*   * 130*   BUN 17 14   CREATININE 0.9 0.9   CALCIUM 7.7* 7.8*   PROT 5.1* 5.2*   ALBUMIN 1.6* 1.6*   BILITOT 0.3 0.3   ALKPHOS 142* 141*   AST 17 14   ALT 5* 10   ANIONGAP 9 8     Blood Sugars (AccuCheck):    Recent Labs     09/29/24  0710 09/29/24  0721 09/29/24  1107 09/29/24  1628 09/29/24  2104 09/30/24  0707   POCTGLUCOSE 163* 150* 186* 321* 140* 167*       Significant Imaging: I have reviewed all pertinent imaging results/findings within the past 24 hours.    Assessment/Plan:      * Surgical wound breakdown  Closed trimalleolar fracture of right ankle s/p ORIF in 7/2024  Surgical site infection  63 yo male presenting with confusion and worsening redness, swelling and pain to right ankle. Patient with sepsis criteria on admit and right ankle wound felt to be source of infection.   - Ortho consulted and patient taken to OR 9/06/2024 for debridement of surgical wound with wound vac placed. Cultures taken and grew both MSSA and Group B streptococcus from wound. Patient taken back again to OR with Ortho with Dr. Liz and patient had another I&Dand wound vac change on 9/9/2024. Orthopedics took back to OR again on 9/20/2024 with Dr. Liz and underwent another I&D of  right ankle and wound and replacement of wound vac. Patient taken back to OR with OR again on 9/23 with Dr. Liz and had another I&D and wound vac change with Orthopedics. Ortho noted anterolateral foot eschar developing. Distal lateral wound breakdown and ortho opened up majority of lateral wound and excised surrounding skin and subcutaneous tissue. Ortho removed all ankle hardware. Wound vacs placed medially and laterally.   - Orthopedics recommended Plastics evaluation for free flap and as part of evaluation recommended Vascular evaluation. Vascular evaluated patient and angiogram of right leg done and PTA done and revascularization of right PT/AT. Plastics plan propeller flap on 9/25/2024 but unfortunately due to presence of lateral and anterior wounds to right ankle no longer candidate for propeller flap so now will need free flap to cover both wounds. Plastics plans to take to OR on 9/26 for free flap to right ankle wounds. Plastics reports if unable to do free flap then patient's only option will be a BKA.  He understands and will f/u further 9/26 OR recs and was in OR for 8 hours with attemptes at targets at least 3 times but was not successful unfortunately and now will plan for BKA with ortho.   Discussed with ID- Andrew Kearns- would cont 24 hours after surgery oxacillin now, and is checking with staff to see if owuld need to consider a ROBERTA to confirm no endocardits before stopping antibiotics as was covering for 6 weeks for osteo that would cover any presumed endocarditis in case had any infxn that wasn't seen on TTE and will f/u recs.  will likely need IP rehab to recover and will plan for PM and R consult after surgery and repeat PT/OT consult  -OR scheduled for 10/1 with dr liz  - Wound cultures -- MSSA and Group B strep from right ankle.   - Blood cultures -- MSSA on admit but repeat blood cultures negative.   - ID consulted and following and appreciate recs:  - Continue IV Oxacillin 12 g  every 24 hours continuous infusion. Plan for orals if hardware retained once completed.  - Anticipate 6 weeks of IV antibiotics from date of most recent washout.  - Continue PT/OT and non weight bearing to right lower extremity as per Ortho. Continue wound vac as per Ortho to surgical site.  - Pain controlled and continue multimodals.   - Continue Lovenox 40 mg subcutaneous daily for DVT prophylaxis.     Airway malacia  Noted on CT scan of chest. Patient with no acute issues and incidentally noted on CT scan of chest. Patient asymptomatic.       PAD (peripheral artery disease)  Present on admit. Patient noted to have high grade stenosis on CTA of right leg. Vascular surgery consulted and patient taken to OR and had unilateral angiogram of right leg and had PT/AT artery stenosis on 9/17 and underwent PTA of right PT/AT arteries. Appreciate Vascular surgery assistance on case.   Concern with wound breakdown in last week and if free flap not successful on 9/26 then only other option may be bka and unfortunately waws not successful and will f/u ortho recs as now BKA is plan  -s/p free flap attempt with plastics and did attempt donor flap from abdomen/flank area and so has 2 drains from this area post op in place and vac as well from donor site even though did not take, and still in place 9/28 and plastics managing      Acute blood loss anemia  Received 2 U during plastic surgery flap attempt on 9/27 with stable hg post op  - Hgb fluctuating and own to 7.6 on 9/25 from 8.3 on 9/24. No clinical signs of bleeding and will monitor.   - Anemia is likely due to acute blood loss which was from surgery. Most recent hemoglobin and hematocrit are listed below.  Recent Labs     09/25/24  0233 09/26/24  0426 09/26/24  1444 09/26/24 2015 09/26/24  2133 09/27/24  0449   HGB 7.6* 7.8*  --   --   --  8.7*   HCT 25.0* 24.5*   < > 18* 22* 25.5*    < > = values in this interval not displayed.       Plan  - Monitor serial CBC: Daily  -  Transfuse PRBC if patient becomes hemodynamically unstable, symptomatic or H/H drops below 7/21.  - Patient has not received any PRBC transfusions to date  - Patient's anemia is currently stable and monitor. No indication for blood transfusion at this time.     Thrombocytosis  Improving with treatment of infection. Present on admit and related to infection causing increase in platelet count. Monitor daily CBC.       On pre-exposure prophylaxis for HIV  Patient on Truvada as outpatient but held in hospital due to elevated AST and ALT that has resolved. Plan to resume Truvada on discharge.     MSSA bacteremia  Bacteremia resolved.   - Blood cultures from admit grew MSSA. Repeat blood cultures done on 9/8 and 9/10 no growth.  - Infectious Disease consulted and patient placed on IV Oxacillin infusion as suspected source was right ankle surgical wound infection for bacteremia as grew MSSA from right ankle wound.   - Echo done without concern for vegetations.    Uncontrolled type 2 diabetes mellitus with hyperglycemia  Patient's FSGs controlled. Endocrine consulted and managing patient's diabetes in hospital and appreciate assistance. Patient on insulin pump at [facility] but not in hospital and on basal/prandial insulin in hospital as per Endocrine. Appreciate Endocrine assistance on this case.   Last A1c reviewed-   Lab Results   Component Value Date    LABA1C 10.8 (H) 08/15/2016    HGBA1C 8.5 (H) 09/06/2024     Most recent fingerstick glucose reviewed-   Recent Labs   Lab 09/24/24  1930 09/25/24  0733 09/25/24  1123 09/25/24  1542   POCTGLUCOSE 299* 260* 221* 107       Current correctional scale  Low  Maintain anti-hyperglycemic dose as follows-   Antihyperglycemics (From admission, onward)      Start     Stop Route Frequency Ordered    09/25/24 1000  insulin aspart U-100 pen 0-10 Units         -- SubQ Before meals & nightly PRN 09/25/24 0901    09/25/24 0900  insulin glargine U-100 (Lantus) pen 20 Units         -- SubQ Daily  "09/25/24 0844    09/20/24 1130  insulin aspart U-100 pen 2-8 Units         -- SubQ 3 times daily with meals 09/20/24 0844           - Endocrine consulted:  - At this time, recommend that the patient remain off of his pump since he cannot be controlled in the Auto mode. Patient does not interact with pump frequently and relies on the auto mode algorithm.   - Insulin dosing as per Endocrine recommendations.   - Hold oral antihyperglycemics during hospitalization   - Monitor blood sugars with meals and at bedtime in hospital.  - Target blood sugars 140-180 in hospital.  - Diabetic diet.     Closed trimalleolar fracture of right ankle  S/p ORIF 07/2024  Ortho consulted   - see surgical wound breakdown above  NWB    Class 1 obesity due to excess calories with serious comorbidity and body mass index (BMI) of 34.0 to 34.9 in adult  Body mass index is 34.12 kg/m². Morbid obesity complicates all aspects of disease management from diagnostic modalities to treatment. Weight loss encouraged and health benefits explained to patient.         Gastroesophageal reflux disease without esophagitis  Controlled. Continue Carafate every 6 hours po to treat.       Metabolic acidosis  Bicarb 11 on 92/7 unclear reasons, will check lactic acid and resume na bicarb  Resolved. Discontinue sodium bicarbonate tablets on 9/22.   Related to DEEPALI. Patient started on sodium bicarbonate tablets and will continue. Monitor daily HCO3 levels with labs.   Lactate never drawn but improved to 18 now. Will titrate down na bicarb pending trends 9/28      Uncontrolled type 2 diabetes mellitus with diabetic polyneuropathy, with long-term current use of insulin  Patient's FSGs are uncontrolled due to hyperglycemia on current medication regimen.  Last A1c reviewed-   Lab Results   Component Value Date    LABA1C 10.8 (H) 08/15/2016    HGBA1C 9.9 (H) 07/18/2024     Most recent fingerstick glucose reviewed- No results for input(s): "POCTGLUCOSE" in the last 24 " hours.  Current correctional scale  Low  Maintain anti-hyperglycemic dose as follows-   Antihyperglycemics (From admission, onward)      Start     Stop Route Frequency Ordered    09/06/24 0506  insulin aspart U-100 pen 0-5 Units         -- SubQ Every 6 hours PRN 09/06/24 0406          Hold Oral hypoglycemics while patient is in the hospital.    Anxiety  Had panic episodes and pulling at lines when he woke up in panic on 9/28 overnight. Start trazodone for sleep and has ativan prn if anxiety severe. Dc telemetry as has had no ectopy through 3+ weeks of admit and 1 less line to cause distress or panic        VTE Risk Mitigation (From admission, onward)           Ordered     enoxaparin injection 40 mg  Daily         09/18/24 0808     IP VTE HIGH RISK PATIENT  Once         09/06/24 1735                    Discharge Planning   JAYLEEN: 10/3/2024     Code Status: Full Code   Is the patient medically ready for discharge?:     Reason for patient still in hospital (select all that apply): Patient trending condition  Discharge Plan A: Home Health                  Maria Del Rosario Medina MD  Department of Hospital Medicine   [facility] - Surgery

## 2024-09-30 NOTE — PROGRESS NOTES
Plastic and Reconstructive Surgery   Progress Note    Subjective:     Vac still in place. Corner appears to be more ischemic     Objective:  Vital signs in last 24 hours:  Temp:  [97.7 °F (36.5 °C)-98.8 °F (37.1 °C)] 98.5 °F (36.9 °C)  Pulse:  [] 100  Resp:  [17-18] 18  SpO2:  [96 %-97 %] 96 %  BP: (137-171)/(63-75) 171/75    Intake/Output last 3 shifts:  I/O last 3 completed shifts:  In: -   Out: 1395 [Urine:1245; Other:150]    Intake/Output this shift:  No intake/output data recorded.        Physical Exam:  VITAL SIGNS:   Vitals:    24 2109 24 2332 24 0405 24 0415   BP:  137/63 (!) 171/75    BP Location:  Right arm     Patient Position:  Lying Lying    Pulse:  97 100    Resp: 18 18 17 18   Temp:  97.7 °F (36.5 °C) 98.5 °F (36.9 °C)    TempSrc:  Oral Oral    SpO2:  96% 96%    Weight:       Height:         TMAX: Temp (24hrs), Av.4 °F (36.9 °C), Min:97.7 °F (36.5 °C), Max:98.8 °F (37.1 °C)      General: Alert; No acute distress  Cardiovascular: Regular rate   Respiratory: Normal respiratory effort. Chest rise symmetric.   Abdomen: Soft, nontender, nondistended  Extremity:  RLE vac in place , able to wiggle toes, sensation intact, splint in place, Wound vac - serosang  Incisional vac on back incision, adequate suction, minimal output, no hematoma/ seroma on back yet  Neurologic: No focal deficit. Speech normal      Scheduled Medications acetaminophen, 1,000 mg, Q8H  amLODIPine, 10 mg, Daily  enoxaparin, 40 mg, Daily  hydrALAZINE, 50 mg, Q8H  insulin aspart U-100, 4-8 Units, TIDWM  insulin glargine U-100, 20 Units, Daily  losartan, 50 mg, Daily  nortriptyline, 50 mg, Nightly  oxacillin 12 g in  mL CONTINUOUS INFUSION, 12 g, Q24H  pantoprazole, 40 mg, Daily  polyethylene glycol, 17 g, BID  senna, 8.6 mg, BID  sodium bicarbonate, 650 mg, TID  sucralfate, 1 g, Q6H  traZODone, 50 mg, QHS  vitamin D, 1,000 Units, Daily        PRN Medications     Current Facility-Administered  Medications:     0.9%  NaCl infusion (for blood administration), , Intravenous, Q24H PRN    0.9%  NaCl infusion (for blood administration), , Intravenous, Q24H PRN    albuterol-ipratropium, 3 mL, Nebulization, Q4H PRN    calcium carbonate, 1,000 mg, Oral, TID PRN    dextrose 10%, 12.5 g, Intravenous, PRN    dextrose 10%, 12.5 g, Intravenous, PRN    dextrose 10%, 12.5 g, Intravenous, PRN    dextrose 10%, 25 g, Intravenous, PRN    dextrose 10%, 25 g, Intravenous, PRN    dextrose 10%, 25 g, Intravenous, PRN    glucagon (human recombinant), 1 mg, Intramuscular, PRN    glucagon (human recombinant), 1 mg, Intramuscular, PRN    glucose, 16 g, Oral, PRN    glucose, 24 g, Oral, PRN    hydrALAZINE, 50 mg, Oral, Q8H PRN    insulin aspart U-100, 0-10 Units, Subcutaneous, QID (AC + HS) PRN    LORazepam, 0.5 mg, Oral, Q6H PRN    methocarbamoL, 500 mg, Oral, TID PRN    morphine, 2 mg, Intravenous, Q6H PRN    morphine, 4 mg, Intravenous, Q6H PRN    ondansetron, 8 mg, Oral, Q8H PRN    prochlorperazine, 5 mg, Intravenous, Q30 Min PRN    Recent Labs:   Lab Results   Component Value Date    WBC 10.26 09/30/2024    HGB 7.8 (L) 09/30/2024    HCT 24.5 (L) 09/30/2024    MCV 89 09/30/2024     (H) 09/30/2024     Lab Results   Component Value Date     (H) 09/30/2024     09/30/2024    K 3.9 09/30/2024     09/30/2024    BUN 14 09/30/2024         Assessment:   64 y.o. y/o male s/p Procedure(s):  REPLACEMENT, WOUND VAC; white & black sponge  REMOVAL, HARDWARE, ANKLE      4 Days Post-Op     64 y.o.male W/ right ankle post-surgical medial wound dehiscence and exposed hardware.      Now s/p PTA RLE PT/AT, completion angiogram showing 3 vessel run off, also washout and vac exchange    S/p Attempted Parascapular flap- however calcified PT and poor inflow     Plan  - Drains: removed on accident by patient. Will monitor for seroma  - back wound vac with adequate seal  - RLE wound vac with seal in splint  - Reg diet  - Abx: per  ID  - DVT ppx, IS, PT/OT  - Appreciate Ortho recs for bka next week  - will plan for back wound vac takedown on the day of amputation to assess if any part of the back needs debridement while in OR for BKA      Pt was d/w attending surgeon, Dr. Rom Silva MD   Plastic Surgery Fellow  9/30/2024

## 2024-09-30 NOTE — SUBJECTIVE & OBJECTIVE
Principal Problem:Surgical wound breakdown    Principal Orthopedic Problem: s/p I&D and wound vac placement on 09/06, 09/20     Interval History: Reiterated plan for BKA tomorrow. Patient is agreeable and all questions were answered. Hb 7.8. We will hold 1U of blood to be transfused tomorrow pre op.       Review of patient's allergies indicates:   Allergen Reactions    Invokana [canagliflozin] Anaphylaxis    Percocet [oxycodone-acetaminophen] Nausea Only and Hallucinations    Biaxin [clarithromycin]     Hydrocodone Other (See Comments)     Dizzy/nausea/hallucinations    Sulfa (sulfonamide antibiotics) Nausea Only and Rash       Current Facility-Administered Medications   Medication    0.9%  NaCl infusion (for blood administration)    0.9%  NaCl infusion (for blood administration)    acetaminophen tablet 1,000 mg    albuterol-ipratropium 2.5 mg-0.5 mg/3 mL nebulizer solution 3 mL    amLODIPine tablet 10 mg    calcium carbonate 200 mg calcium (500 mg) chewable tablet 1,000 mg    dextrose 10% bolus 125 mL 125 mL    dextrose 10% bolus 125 mL 125 mL    dextrose 10% bolus 125 mL 125 mL    dextrose 10% bolus 250 mL 250 mL    dextrose 10% bolus 250 mL 250 mL    dextrose 10% bolus 250 mL 250 mL    enoxaparin injection 40 mg    glucagon (human recombinant) injection 1 mg    glucagon (human recombinant) injection 1 mg    glucose chewable tablet 16 g    glucose chewable tablet 24 g    hydrALAZINE tablet 50 mg    hydrALAZINE tablet 50 mg    insulin aspart U-100 pen 0-10 Units    insulin aspart U-100 pen 5-8 Units    insulin glargine U-100 (Lantus) pen 20 Units    LORazepam tablet 0.5 mg    losartan tablet 50 mg    methocarbamoL tablet 500 mg    morphine injection 2 mg    morphine injection 4 mg    nortriptyline capsule 50 mg    ondansetron disintegrating tablet 8 mg    oxacillin 12 g in  mL CONTINUOUS INFUSION    pantoprazole EC tablet 40 mg    polyethylene glycol packet 17 g    prochlorperazine injection Soln 5 mg     "senna tablet 8.6 mg    sodium bicarbonate tablet 650 mg    sucralfate 100 mg/mL suspension 1 g    traZODone tablet 50 mg    vitamin D 1000 units tablet 1,000 Units     Objective:     Vital Signs (Most Recent):  Temp: 97.3 °F (36.3 °C) (09/30/24 1054)  Pulse: 98 (09/30/24 1054)  Resp: 18 (09/30/24 1054)  BP: (!) 145/70 (09/30/24 1054)  SpO2: 95 % (09/30/24 1054) Vital Signs (24h Range):  Temp:  [97.3 °F (36.3 °C)-98.8 °F (37.1 °C)] 97.3 °F (36.3 °C)  Pulse:  [] 98  Resp:  [17-18] 18  SpO2:  [95 %-97 %] 95 %  BP: (128-171)/(60-75) 145/70     Weight: 93 kg (205 lb 0.4 oz)  Height: 5' 5" (165.1 cm)  Body mass index is 34.12 kg/m².      Intake/Output Summary (Last 24 hours) at 9/30/2024 1301  Last data filed at 9/30/2024 0549  Gross per 24 hour   Intake --   Output 975 ml   Net -975 ml          Ortho/SPM Exam     AAOx4  NAD  Tachycardic  No increased WOB    RLE  Medial wound vac to good suction  Lateral side with fibrinous exudate and weeping ecchymosis distally  Compartments soft/compressible  Sensation diminished (at baseline)   Active toe dorsiflexion & plantarflexion  WWP. Brisk cap refill      Significant Labs:     Recent Labs   Lab 09/30/24  0434   WBC 10.26   RBC 2.76*   HGB 7.8*   HCT 24.5*   *   MCV 89   MCH 28.3   MCHC 31.8*         Significant Imaging: I have reviewed and interpreted all pertinent imaging results/findings.    "

## 2024-09-30 NOTE — ASSESSMENT & PLAN NOTE
Cortes Schroeder is a 64 y.o. male with PMH of DM, HTN  and right trimalleolar ankle fracture status post ex fix on 07/18 with subsequent definitive fixation on 07/26 admitted with right ankle wound dehiscence in the setting of uncontrolled diabetes.      He is s/p I&D of R ankle 09/06. Repeat I&D R medial ankle 09/09. 9/17 angiography and medial ankle wound vac exchange.   To OR 9/20 and 9/23 for repeat I&D and vac exchange. Hardware removed on 9/23.   Free flap aborted 9/26 due to poor vascular inflow. Plan is for right BKA 10/1. \  NPO at midnight.     Pain control: multimodal regimen  PT/OT:  NWB RLE   DVT PPx: Lvx - hold today in prep for OR  Abx:  oxacillin continuous; ID following   Cultures:  ankle cx staph +    Dispo: plan for BKA tomorrow

## 2024-09-30 NOTE — ANESTHESIA PREPROCEDURE EVALUATION
[facility]  Anesthesia Pre-Operative Evaluation         Patient Name: Cortes Schroeder  YOB: 1960  MRN: [id number]    SUBJECTIVE:     Pre-operative evaluation for Procedure(s) (LRB):  AMPUTATION, BELOW KNEE - RIGHT (Right)     09/30/2024    Cortes Schroeder is a 64 y.o. male w/ a significant PMHx of anxiety, T2DM, GERD, obesity, HLD, cervical spondolysis, HTN, PAD, now s/p R ankle attempted parascapular flap for prior complicated ORIF 7/2024.    Patient now presents for the above procedure(s) due to surgical site infection and wound breakdown.    PAPER ANESTHESIA CONSENT IN CHART      LDA: None documented.       Peripheral IV - Single Lumen 09/29/24 1153 20 G 1 3/4 in Anterior;Right Forearm (Active)   Site Assessment Clean;Dry;Intact 09/30/24 0830   Extremity Assessment Distal to IV No abnormal discoloration 09/30/24 0830   Line Status Saline locked 09/30/24 0830   Dressing Status Clean;Dry;Intact 09/30/24 0830   Dressing Intervention Integrity maintained 09/30/24 0830   Site Change Due 10/03/24 09/29/24 1316   Reason Not Rotated Not due 09/29/24 1316   Number of days: 0            Peripheral IV - Single Lumen 09/29/24 1153 20 G 1 3/4 in Anterior;Right Upper Arm (Active)   Site Assessment Clean;Dry;Intact 09/30/24 0830   Extremity Assessment Distal to IV No abnormal discoloration 09/30/24 0830   Line Status Saline locked 09/30/24 0830   Dressing Status Clean;Dry;Intact 09/30/24 0830   Dressing Intervention Integrity maintained 09/30/24 0830   Site Change Due 10/03/24 09/29/24 1314   Reason Not Rotated Not due 09/29/24 1314   Number of days: 0       Prev airway:     Date/Time: 9/26/2024 2:00 PM     Performed by: Lombard, Jeffrey C., CRNA  Authorized by: Sari Cheney MD    Intubation:     Induction:  Inhalational - mask    Intubated:  Postinduction    Mask Ventilation:  Easy with oral airway    Attempts:  1    Attempted By:  ANGELITO    Blade:  Bravo 3    Laryngeal View Grade: Grade  I - full view of cords      Difficult Airway Encountered?: No      Complications:  None    Airway Device:  Direct    Airway Device Size:  7.5    Style/Cuff Inflation:  Cuffed (inflated to minimal occlusive pressure)    Tube secured:  23    Secured at:  The lips    Placement Verified By:  Auscultation    Complicating Factors:  None    Findings Post-Intubation:  Bilateral breath sounds, positive ETCO2 and atraumatic / condition of teeth unchanged    Drips:    lactated ringers   Intravenous Continuous 100 mL/hr at 09/27/24 0230 New Bag at 09/27/24 0230       Patient Active Problem List   Diagnosis    Hyperlipidemia    Anxiety    Chronic bilateral low back pain without sciatica    Neuropathy    Uncontrolled type 2 diabetes mellitus with diabetic polyneuropathy, with long-term current use of insulin    NPDR (nonproliferative diabetic retinopathy)    Insomnia    Metabolic acidosis    Gastroesophageal reflux disease without esophagitis    Chronic pain syndrome    Diabetic nephropathy associated with type 2 diabetes mellitus    Right sided weakness    Cervical syndrome    Chronic neck pain    Muscle weakness    Impaired motor control    Decreased range of motion (ROM) of shoulder    Cervical spondylosis without myelopathy    Neuroforaminal stenosis of cervical spine    Right cervical radiculopathy    Cervical radiculopathy    DDD (degenerative disc disease), lumbar    Insulin pump status    Class 1 obesity due to excess calories with serious comorbidity and body mass index (BMI) of 34.0 to 34.9 in adult    High risk homosexual behavior    Lumbar radiculopathy    Lumbar spondylosis    Left lumbar radiculitis    Closed trimalleolar fracture of right ankle    Polyneuropathy due to type 2 diabetes mellitus    Uncontrolled type 2 diabetes mellitus with hyperglycemia    Abnormal thyroid blood test    Surgical wound breakdown    Surgical site infection    MSSA bacteremia    On pre-exposure prophylaxis for HIV    Thrombocytosis     Acute blood loss anemia    PAD (peripheral artery disease)    Airway malacia       Review of patient's allergies indicates:   Allergen Reactions    Invokana [canagliflozin] Anaphylaxis    Percocet [oxycodone-acetaminophen] Nausea Only and Hallucinations    Biaxin [clarithromycin]     Hydrocodone Other (See Comments)     Dizzy/nausea/hallucinations    Sulfa (sulfonamide antibiotics) Nausea Only and Rash       Current Inpatient Medications:   acetaminophen  1,000 mg Oral Q8H    amLODIPine  10 mg Oral Daily    enoxaparin  40 mg Subcutaneous Daily    hydrALAZINE  50 mg Oral Q8H    insulin aspart U-100  4-8 Units Subcutaneous TIDWM    insulin glargine U-100  20 Units Subcutaneous Daily    losartan  50 mg Oral Daily    nortriptyline  50 mg Oral Nightly    oxacillin 12 g in  mL CONTINUOUS INFUSION  12 g Intravenous Q24H    pantoprazole  40 mg Oral Daily    polyethylene glycol  17 g Oral BID    senna  8.6 mg Oral BID    sodium bicarbonate  650 mg Oral TID    sucralfate  1 g Oral Q6H    traZODone  50 mg Oral QHS    vitamin D  1,000 Units Oral Daily       No current facility-administered medications on file prior to encounter.     Current Outpatient Medications on File Prior to Encounter   Medication Sig Dispense Refill    acetaminophen (TYLENOL) 325 MG tablet Take 2 tablets (650 mg total) by mouth every 6 (six) hours.      aspirin (ECOTRIN) 81 MG EC tablet Take 1 tablet (81 mg total) by mouth 2 (two) times a day. End date sept 20, 2024      atorvastatin (LIPITOR) 40 MG tablet Take 40 mg by mouth once daily.      blood-glucose sensor (DEXCOM G6 SENSOR) Omaira Change sensor every 10 days 3 each PRN    blood-glucose transmitter (DEXCOM G6 TRANSMITTER) Omaira Change transmitter every 3 months 1 each PRN    celecoxib (CELEBREX) 200 MG capsule Take 1 capsule (200 mg total) by mouth once daily.      cyanocobalamin (VITAMIN B-12) 1000 MCG tablet Take 1,000 mcg by mouth once daily.      DULoxetine (CYMBALTA) 60 MG capsule Take 1  capsule (60 mg total) by mouth once daily. 90 capsule 0    emtricitabine-tenofovir 200-300 mg (TRUVADA) 200-300 mg Tab Take 1 tablet by mouth once daily. 30 tablet 0    enoxaparin (LOVENOX) 40 mg/0.4 mL Syrg Inject 40 mg into the skin Daily.      fish oil-omega-3 fatty acids 300-1,000 mg capsule Take 1 capsule by mouth 2 (two) times daily.      gabapentin (NEURONTIN) 300 MG capsule Take 2 capsules (600 mg total) by mouth once daily AND 4 capsules (1,200 mg total) every evening.      HUMALOG U-100 INSULIN 100 unit/mL injection 1:20 U PRN high blood sugar and snacks. Correction dose- Enter carb coverage intake upon administration  Target number 120 Carbohydrate coverage #1 1:2 Carbohydrate coverage #1 time [id number] Carbohydrate coverage #2 1:3 Carbohydrate coverage #2 time [id number] Carbohydrate coverage #3 Carbohydrate coverage #3 time Carbohydrate coverage #4 Carbohydrate coverage #4 time Sensitivity #1 1:20 Sensitivity #1 time 3245-1866 Sensitivity #2 Sensitivity #2 time Sensitivity #3 Sensitivity #3 time Sensitivity #4 Sensitivity #4 time Sensitivity #5 Sensitivity #5 time Order Questions Question Answer Comment       50 U SQ continuous  Target number 120 Basal Rate #1 2.3 Basal rate #1 time 4428-2239 Basal Rate #2 1.55 Basal rate #2 time 5012-5409 Basal rate #3 Basal rate #3 time Basal rate #4 Basal rate #4 time Basal rate #5 Basal rate #5 time      losartan (COZAAR) 25 MG tablet Take 1 tablet (25 mg total) by mouth once daily.      magnesium oxide (MAG-OX) 400 mg (241.3 mg magnesium) tablet Take 0.5 tablets (200 mg total) by mouth once daily.      melatonin (MELATIN) 3 mg tablet Take 2 tablets (6 mg total) by mouth nightly as needed for Insomnia.      methocarbamoL (ROBAXIN) 500 MG Tab Take 1 tablet (500 mg total) by mouth 4 (four) times daily as needed (pain scale 4-7).      morphine (MSIR) 15 MG tablet Take 1 tablet (15 mg total) by mouth every 4 (four) hours as needed (pain scale 6-10).      MOUNJARO 10  mg/0.5 mL PnIj Inject 10 mg into the skin once a week. HOLD until all surgeries completed and out of rehab      nortriptyline (PAMELOR) 50 MG capsule Take 1 capsule (50 mg total) by mouth nightly. 90 capsule 0    ondansetron (ZOFRAN-ODT) 4 MG TbDL Take 2 tablets (8 mg total) by mouth every 6 (six) hours as needed (nausea).      polyethylene glycol (GLYCOLAX) 17 gram PwPk Take 17 g by mouth once daily.      rosuvastatin (CRESTOR) 10 MG tablet Take 10 mg by mouth every evening.      senna-docusate 8.6-50 mg (PERICOLACE) 8.6-50 mg per tablet Take 1 tablet by mouth 2 (two) times daily.      vitamin D 1000 units Tab Take 1,000 Units by mouth 2 (two) times daily.      [DISCONTINUED] insulin aspart U-100 (NOVOLOG U-100 INSULIN ASPART) 100 unit/mL injection Use in insulin pump. Max daily dose 150 units. 50 mL 5       Past Surgical History:   Procedure Laterality Date    ANGIOGRAPHY OF LOWER EXTREMITY Right 9/17/2024    Procedure: Angiogram Extremity Unilateral;  Surgeon: GINA Morton II, MD;  Location: [address];  Service: Vascular;  Laterality: Right;  Fluro time 19.5 min  mGy 260.49    ANKLE HARDWARE REMOVAL Right 9/23/2024    Procedure: REMOVAL, HARDWARE, ANKLE;  Surgeon: Moe Liz MD;  Location: [address];  Service: Orthopedics;  Laterality: Right;    APPLICATION OF WOUND VACUUM-ASSISTED CLOSURE DEVICE Right 9/6/2024    Procedure: APPLICATION, WOUND VAC;  Surgeon: Moe Liz MD;  Location: [address];  Service: Orthopedics;  Laterality: Right;    APPLICATION, EXTERNAL FIXATION DEVICE, FOR ANKLE FRACTURE Right 7/18/2024    Procedure: APPLICATION, EXTERNAL FIXATION DEVICE, FOR ANKLE FRACTURE;  Surgeon: Moe Liz MD;  Location: [address];  Service: Orthopedics;  Laterality: Right;    ARTHROTOMY OF ANKLE Right 9/9/2024    Procedure: ARTHROTOMY, ANKLE;  Surgeon: Moe Liz MD;  Location: [address];  Service: Orthopedics;  Laterality: Right;    BACK SURGERY   2003    Lumbar Spine    CATARACT EXTRACTION      CLOSED REDUCTION, FRACTURE, ANKLE, TRIMALLEOLAR Right 7/18/2024    Procedure: CLOSED REDUCTION, FRACTURE, ANKLE, TRIMALLEOLAR;  Surgeon: Moe Liz MD;  Location: [facility] OR [facility];  Service: Orthopedics;  Laterality: Right;    EPIDURAL STEROID INJECTION N/A 1/16/2019    Procedure: Injection, Steroid, Epidural Cervical;  Surgeon: Andrew Sinclair Jr., MD;  Location: [facility] ENDO;  Service: Pain Management;  Laterality: N/A;  Cervical Epidural Steroid Injection C7-T1    97149    Arrive @ 1240    EPIDURAL STEROID INJECTION N/A 2/20/2019    Procedure: Injection, Steroid, Epidural;  Surgeon: Andrew Sinclair Jr., MD;  Location: [facility] ENDO;  Service: Pain Management;  Laterality: N/A;  Lumbar Epidural Steroid Injection L4-5    55885    Arrive @ 1215 (requests latest time)    FIXATION OF SYNDESMOSIS OF ANKLE Right 7/26/2024    Procedure: FIXATION, SYNDESMOSIS, ANKLE;  Surgeon: Moe Liz MD;  [facility];  Service: Orthopedics;  Laterality: Right;    FLAP PROCEDURE Right 9/26/2024    Procedure: CREATION, FREE FLAP;  Surgeon: Juanito Cueto DO;  [facility];  Service: Plastics;  Laterality: Right;  Spy; Def Free Flap; Micro instr., Microscope; 2 bovies, 2 teams    INJECTION, SPINE, LUMBOSACRAL, TRANSFORAMINAL APPROACH Bilateral 6/14/2024    Procedure: Bilateral L5 Transforaminal Epidural Steroid Injections;  Surgeon: Andrew Sinclair Jr., MD;  Location: [facility] PAIN MANAGEMENT;  Service: Pain Management;  Laterality: Bilateral;  @1300(given)  ASA last 6/8  Check BG  MD Sign.    IRRIGATION AND DEBRIDEMENT Right 9/6/2024    Procedure: IRRIGATION AND DEBRIDEMENT;  Surgeon: Moe Liz MD;  [facility];  Service: Orthopedics;  Laterality: Right;    IRRIGATION AND DEBRIDEMENT Right 9/20/2024    Procedure: IRRIGATION AND DEBRIDEMENT;  Surgeon: Moe Liz MD;  [facility];  Service:  Orthopedics;  Laterality: Right;    IRRIGATION AND DEBRIDEMENT OF LOWER EXTREMITY Right 9/9/2024    Procedure: IRRIGATION AND DEBRIDEMENT, LOWER EXTREMITY - WITH WOUND VAC CHANGE, RIGHT ANKLE;  Surgeon: Moe Liz MD;  [facility];  Service: Orthopedics;  Laterality: Right;  WITH WOUND VAC CHANGE, RIGHT ANKLE    OPEN REDUCTION AND INTERNAL FIXATION (ORIF) OF FRACTURE OF DISTAL RADIUS Left 7/19/2024    Procedure: ORIF, FRACTURE, RADIUS, DISTAL - LEFT;  Surgeon: Moe Liz MD;  Location: [facility] OR [address] [place] [address];  Service: Orthopedics;  Laterality: Left;  LEFT, SYNTHES, C ARM    OPEN REDUCTION AND INTERNAL FIXATION (ORIF) OF INJURY OF ANKLE Right 7/26/2024    Procedure: ORIF, ANKLE;  Surgeon: Moe Liz MD;  Location: [facility] OR [address] [place] [address];  Service: Orthopedics;  Laterality: Right;    REMOVAL OF EXTERNAL FIXATION DEVICE Right 7/26/2024    Procedure: REMOVAL, EXTERNAL FIXATION DEVICE;  Surgeon: Moe Liz MD;  [facility];  Service: Orthopedics;  Laterality: Right;    REPLACEMENT OF WOUND VACUUM-ASSISTED CLOSURE DEVICE Right 9/17/2024    Procedure: REPLACEMENT, WOUND VAC;  Surgeon: Moe Liz MD;  [facility];  Service: Orthopedics;  Laterality: Right;  wound vac dressing exchange    REPLACEMENT OF WOUND VACUUM-ASSISTED CLOSURE DEVICE Right 9/20/2024    Procedure: REPLACEMENT, WOUND VAC;  Surgeon: Moe Liz MD;  Location: [facility] OR [address] [place] [address];  Service: Orthopedics;  Laterality: Right;    REPLACEMENT OF WOUND VACUUM-ASSISTED CLOSURE DEVICE Right 9/23/2024    Procedure: REPLACEMENT, WOUND VAC; white & black sponge;  Surgeon: Moe Liz MD;  Location: [facility] OR [address] [place] [address];  Service: Orthopedics;  Laterality: Right;       Social History     Socioeconomic History    Marital status:    Tobacco Use    Smoking status: Never    Smokeless tobacco: Never   Substance and Sexual Activity    Alcohol use: Yes     Alcohol/week: 1.0 standard drink of  alcohol     Types: 1 Glasses of wine per week     Comment: occasionally    Drug use: No    Sexual activity: Not Currently     Partners: Male     Birth control/protection: Condom     Comment: 10/2/17      Social Drivers of Health     Financial Resource Strain: High Risk (9/8/2024)    Overall Financial Resource Strain (CARDIA)     Difficulty of Paying Living Expenses: Hard   Food Insecurity: No Food Insecurity (9/8/2024)    Hunger Vital Sign     Worried About Running Out of Food in the Last Year: Never true     Ran Out of Food in the Last Year: Never true   Recent Concern: Food Insecurity - Food Insecurity Present (7/22/2024)    Hunger Vital Sign     Worried About Running Out of Food in the Last Year: Sometimes true     Ran Out of Food in the Last Year: Sometimes true   Transportation Needs: No Transportation Needs (9/8/2024)    TRANSPORTATION NEEDS     Transportation : No   Physical Activity: Inactive (9/8/2024)    Exercise Vital Sign     Days of Exercise per Week: 0 days     Minutes of Exercise per Session: 0 min   Stress: Stress Concern Present (9/8/2024)    [employer] [place] [employer] - Occupational Stress Questionnaire     Feeling of Stress : To some extent   Housing Stability: Low Risk  (9/8/2024)    Housing Stability Vital Sign     Unable to Pay for Housing in the Last Year: No     Homeless in the Last Year: No       OBJECTIVE:     Vital Signs Range (Last 24H):  Temp:  [36.3 °C (97.3 °F)-37.1 °C (98.8 °F)]   Pulse:  []   Resp:  [17-18]   BP: (128-171)/(60-75)   SpO2:  [95 %-97 %]       Significant Labs:  Lab Results   Component Value Date    WBC 10.26 09/30/2024    HGB 7.8 (L) 09/30/2024    HCT 24.5 (L) 09/30/2024     (H) 09/30/2024    CHOL 104 09/23/2021    TRIG 203 (H) 09/23/2021    HDL 38 (L) 09/23/2021    ALT 10 09/30/2024    AST 14 09/30/2024     09/30/2024    K 3.9 09/30/2024     09/30/2024    CREATININE 0.9 09/30/2024    BUN 14 09/30/2024    CO2 21 (L)  "09/30/2024    TSH 6.243 (H) 07/18/2024    INR 1.0 09/26/2024    HGBA1C 8.5 (H) 09/06/2024    MICROALBUR 48.0 04/27/2022       Diagnostic Studies: No relevant studies.    EKG:   Results for orders placed or performed during the hospital encounter of 09/06/24   EKG 12-lead    Collection Time: 09/08/24  9:39 AM   Result Value Ref Range    QRS Duration 72 ms    [facility] QTC Calculation 399 ms    Narrative    Test Reason : R00.0,    Vent. Rate : 114 BPM     Atrial Rate : 114 BPM     P-R Int : 208 ms          QRS Dur : 072 ms      QT Int : 290 ms       P-R-T Axes : 040 -11 044 degrees     QTc Int : 399 ms    Sinus tachycardia  Inferior infarct ,age undetermined  Abnormal ECG  When compared with ECG of 06-SEP-2024 02:27,  Criteria for Inferior infarct is now Present  Confirmed by Jolie Weiss MD ([id number]) on 9/8/2024 12:06:07 PM    Referred By: [email or web address]   SELF           Confirmed By:Jolie Weiss MD       2D ECHO:  TTE:  Results for orders placed or performed during the hospital encounter of 09/06/24   Echo   Result Value Ref Range    RA Width 3.31 cm    LA Vol (MOD) 44.61 cm3    Left Atrium Major Axis 4.71 cm    Left Atrium Minor Axis 4.50 cm    RA Major Axis 4.19 cm    LV Diastolic Volume 55.80 mL    LV Systolic Volume 22.21 mL    PV Peak D Ryan 0.37 m/s    PV Peak S Ryan 0.61 m/s    MV Peak A Ryan 1.04 m/s    MV stenosis pressure 1/2 time 42.67 ms    MV Peak E Ryan 0.92 m/s    Ao VTI 20.14 cm    Ao peak ryan 1.31 m/s    LVOT peak VTI 22.91 cm    LVOT peak ryan 1.18 m/s    LVOT diameter 2.04 cm    MV "A" wave duration 5.14 msec    E wave deceleration time 147.14 msec    AV mean gradient 4 mmHg    RV- morales basal diam 2.4 cm    RV S' 16.55 cm/s    TAPSE 2.40 cm    LA size 3.00 cm    Ascending aorta 3.10 cm    STJ 3.04 cm    Sinus 3.29 cm    LVIDs 2.49 2.1 - 4.0 cm    PW 0.87 0.6 - 1.1 cm    IVS 0.89 0.6 - 1.1 cm    LVIDd 3.64 3.5 - 6.0 cm    TDI LATERAL 0.11 m/s    LA WIDTH 3.76 cm    TDI SEPTAL 0.07 m/s    LV LATERAL E/E' RATIO 8.36 " m/s    LV SEPTAL E/E' RATIO 13.14 m/s    FS 32 28 - 44 %    LA Vol 44.13 cm3    LV mass 91.48 g    ZLVIDD -4.71     ZLVIDS -2.87     Left Ventricle Relative Wall Thickness 0.48 cm    AV valve area 3.72 cm²    AV Velocity Ratio 0.90     AV index (prosthetic) 1.14     MV valve area p 1/2 method 5.16 cm2    E/A ratio 0.88     Mean e' 0.09 m/s    Pulm vein S/D ratio 1.65     LVOT area 3.3 cm2    LVOT stroke volume 74.84 cm3    AV peak gradient 7 mmHg    E/E' ratio 10.22 m/s    LV Systolic Volume Index 11.1 mL/m2    LV Diastolic Volume Index 27.90 mL/m2    MELIZA 22.1 mL/m2    LV Mass Index 46 g/m2    MELIZA (MOD) 22.3 mL/m2    EUGENIO by Velocity Ratio 2.94 cm²    BSA 2.07 m2    Est. RA pres 3 mmHg    Narrative      Left Ventricle: The left ventricle is normal in size. Normal wall   thickness. There is normal systolic function with a visually estimated   ejection fraction of 65 - 70%. There is normal diastolic function.    Right Ventricle: Normal right ventricular cavity size. Wall thickness   is normal. Systolic function is normal.    IVC/SVC: Normal venous pressure at 3 mmHg.    No evidence of intracardiac mass or vegetation.         ROBERTA:  No results found. However, due to the size of the patient record, not all encounters were searched. Please check Results Review for a complete set of results.    ASSESSMENT/PLAN:          Pre-op Assessment    I have reviewed the Patient Summary Reports.     I have reviewed the Nursing Notes. I have reviewed the NPO Status.   I have reviewed the Medications.     Review of Systems  Anesthesia Hx:  No problems with previous Anesthesia                Social:  Non-Smoker       Hematology/Oncology:  Hematology Normal   Oncology Normal                                   EENT/Dental:  EENT/Dental Normal  Airway malacia noted on chest CT.           Cardiovascular:     Hypertension                                        Pulmonary:  Pulmonary Normal                       Renal/:  Renal/ Normal                  Hepatic/GI:     GERD             Musculoskeletal:  Musculoskeletal Normal    Cervical spondylosis             Neurological:    Neuromuscular Disease,                                   Endocrine:  Diabetes, using insulin           Dermatological:  Skin Normal    Psych:  Psychiatric Normal                    Physical Exam  General: Well nourished, Alert, Cooperative and Oriented    Airway:  Mallampati: III / II  Mouth Opening: Normal  TM Distance: Normal  Tongue: Normal  Neck ROM: Normal ROM    Dental:  Periodontal disease    Chest/Lungs:  Normal Respiratory Rate    Heart:  Rate: Normal        Anesthesia Plan  Type of Anesthesia, risks & benefits discussed:    Anesthesia Type: Gen ETT, Gen Natural Airway  Intra-op Monitoring Plan: Standard ASA Monitors  Post Op Pain Control Plan: multimodal analgesia and IV/PO Opioids PRN  Induction:  IV  Airway Plan: Direct, Post-Induction  Informed Consent: Informed consent signed with the Patient and all parties understand the risks and agree with anesthesia plan.  All questions answered.   ASA Score: 3  Day of Surgery Review of History & Physical: H&P Update referred to the surgeon/provider.    Ready For Surgery From Anesthesia Perspective.     .

## 2024-09-30 NOTE — PLAN OF CARE
Pt AAOx4 and VSS . Progressing with plan of care. Free of skin breakdown as the pt positioned/repositioned well independently. Clean, dry, and intact dressing noted on R ankle and L upper back. Wound vac in place and output charted.  Incentive spirometer at bedside and pt instructed on its use. Pain controlled well with PRN meds. Frequent rounds made to assess pain and safety and no complaints at this time noted. Side rails up x 2. Bed locked. Call light within reach. No falls noted. Will continue to monitor.    Problem: Adult Inpatient Plan of Care  Goal: Absence of Hospital-Acquired Illness or Injury  Outcome: Progressing  Goal: Optimal Comfort and Wellbeing  Outcome: Progressing  Goal: Readiness for Transition of Care  Outcome: Progressing  Goal: Plan of Care Review  Outcome: Progressing  Goal: Patient-Specific Goal (Individualized)  Outcome: Progressing     Problem: Fall Injury Risk  Goal: Absence of Fall and Fall-Related Injury  Outcome: Progressing     Problem: Sepsis/Septic Shock  Goal: Optimal Coping  Outcome: Progressing  Goal: Absence of Bleeding  Outcome: Progressing  Goal: Blood Glucose Level Within Targeted Range  Outcome: Progressing  Goal: Absence of Infection Signs and Symptoms  Outcome: Progressing  Goal: Optimal Nutrition Intake  Outcome: Progressing     Problem: Acute Kidney Injury/Impairment  Goal: Fluid and Electrolyte Balance  Outcome: Progressing  Goal: Improved Oral Intake  Outcome: Progressing  Goal: Effective Renal Function  Outcome: Progressing     Problem: Wound  Goal: Optimal Coping  Outcome: Progressing  Goal: Optimal Functional Ability  Outcome: Progressing  Goal: Absence of Infection Signs and Symptoms  Outcome: Progressing  Goal: Improved Oral Intake  Outcome: Progressing  Goal: Optimal Pain Control and Function  Outcome: Progressing  Goal: Skin Health and Integrity  Outcome: Progressing  Goal: Optimal Wound Healing  Outcome: Progressing     Problem: Diabetes Comorbidity  Goal: Blood  Glucose Level Within Targeted Range  Outcome: Progressing     Problem: Skin Injury Risk Increased  Goal: Skin Health and Integrity  Outcome: Progressing     Problem: Infection  Goal: Absence of Infection Signs and Symptoms  Outcome: Progressing

## 2024-09-30 NOTE — ASSESSMENT & PLAN NOTE
BG goal 140-180    - Lantus (Insulin Glargine) 20 units daily   - Novolog 4-8 units TIDWM (Administer    4   units if patient eats 25-50% of meal, administer    8    units if patient eats > 50% of meal.)  - [facility] SSI (150/25)  - BG checks AC/HS  - Hypoglycemia protocol in place    ** Please notify Endocrine for any change and/or advance in diet**  ** Please call Endocrine for any BG related issues **    Discharge Planning:   TBD. Please notify endocrinology prior to discharge.

## 2024-10-01 ENCOUNTER — ANESTHESIA (OUTPATIENT)
Dept: SURGERY | Facility: HOSPITAL | Age: 64
DRG: 856 | End: 2024-10-01
Payer: COMMERCIAL

## 2024-10-01 LAB
ALBUMIN SERPL BCP-MCNC: 1.5 G/DL (ref 3.5–5.2)
ALP SERPL-CCNC: 125 U/L (ref 55–135)
ALT SERPL W/O P-5'-P-CCNC: 10 U/L (ref 10–44)
ANION GAP SERPL CALC-SCNC: 8 MMOL/L (ref 8–16)
AST SERPL-CCNC: 12 U/L (ref 10–40)
BILIRUB SERPL-MCNC: 0.2 MG/DL (ref 0.1–1)
BLD PROD TYP BPU: NORMAL
BLOOD UNIT EXPIRATION DATE: NORMAL
BLOOD UNIT TYPE CODE: 5100
BLOOD UNIT TYPE: NORMAL
BUN SERPL-MCNC: 13 MG/DL (ref 8–23)
CALCIUM SERPL-MCNC: 7.7 MG/DL (ref 8.7–10.5)
CHLORIDE SERPL-SCNC: 109 MMOL/L (ref 95–110)
CO2 SERPL-SCNC: 20 MMOL/L (ref 23–29)
CODING SYSTEM: NORMAL
CREAT SERPL-MCNC: 0.8 MG/DL (ref 0.5–1.4)
CROSSMATCH INTERPRETATION: NORMAL
DISPENSE STATUS: NORMAL
ERYTHROCYTE [DISTWIDTH] IN BLOOD BY AUTOMATED COUNT: 18 % (ref 11.5–14.5)
EST. GFR  (NO RACE VARIABLE): >60 ML/MIN/1.73 M^2
GLUCOSE SERPL-MCNC: 105 MG/DL (ref 70–110)
HCT VFR BLD AUTO: 21.8 % (ref 40–54)
HGB BLD-MCNC: 7.5 G/DL (ref 14–18)
MCH RBC QN AUTO: 29.4 PG (ref 27–31)
MCHC RBC AUTO-ENTMCNC: 34.4 G/DL (ref 32–36)
MCV RBC AUTO: 86 FL (ref 82–98)
PLATELET # BLD AUTO: 477 K/UL (ref 150–450)
PMV BLD AUTO: 10.1 FL (ref 9.2–12.9)
POCT GLUCOSE: 111 MG/DL (ref 70–110)
POCT GLUCOSE: 171 MG/DL (ref 70–110)
POCT GLUCOSE: 191 MG/DL (ref 70–110)
POCT GLUCOSE: 191 MG/DL (ref 70–110)
POCT GLUCOSE: 220 MG/DL (ref 70–110)
POCT GLUCOSE: 85 MG/DL (ref 70–110)
POTASSIUM SERPL-SCNC: 4.1 MMOL/L (ref 3.5–5.1)
PROT SERPL-MCNC: 5 G/DL (ref 6–8.4)
RBC # BLD AUTO: 2.55 M/UL (ref 4.6–6.2)
SODIUM SERPL-SCNC: 137 MMOL/L (ref 136–145)
TRANS ERYTHROCYTES VOL PATIENT: NORMAL ML
WBC # BLD AUTO: 10.36 K/UL (ref 3.9–12.7)

## 2024-10-01 PROCEDURE — 27880 AMPUTATION OF LOWER LEG: CPT | Mod: 58,RT,, | Performed by: ORTHOPAEDIC SURGERY

## 2024-10-01 PROCEDURE — 37000009 HC ANESTHESIA EA ADD 15 MINS: Performed by: ORTHOPAEDIC SURGERY

## 2024-10-01 PROCEDURE — 63600175 PHARM REV CODE 636 W HCPCS: Performed by: STUDENT IN AN ORGANIZED HEALTH CARE EDUCATION/TRAINING PROGRAM

## 2024-10-01 PROCEDURE — 25000003 PHARM REV CODE 250: Performed by: HOSPITALIST

## 2024-10-01 PROCEDURE — 63600175 PHARM REV CODE 636 W HCPCS: Performed by: NURSE ANESTHETIST, CERTIFIED REGISTERED

## 2024-10-01 PROCEDURE — D9220A PRA ANESTHESIA: Mod: ANES,,, | Performed by: ANESTHESIOLOGY

## 2024-10-01 PROCEDURE — P9021 RED BLOOD CELLS UNIT: HCPCS | Performed by: STUDENT IN AN ORGANIZED HEALTH CARE EDUCATION/TRAINING PROGRAM

## 2024-10-01 PROCEDURE — 63600175 PHARM REV CODE 636 W HCPCS

## 2024-10-01 PROCEDURE — 88307 TISSUE EXAM BY PATHOLOGIST: CPT | Mod: 26,,, | Performed by: PATHOLOGY

## 2024-10-01 PROCEDURE — 64447 NJX AA&/STRD FEMORAL NRV IMG: CPT

## 2024-10-01 PROCEDURE — 80053 COMPREHEN METABOLIC PANEL: CPT | Performed by: HOSPITALIST

## 2024-10-01 PROCEDURE — 86920 COMPATIBILITY TEST SPIN: CPT | Performed by: HOSPITALIST

## 2024-10-01 PROCEDURE — 25000003 PHARM REV CODE 250: Performed by: NURSE ANESTHETIST, CERTIFIED REGISTERED

## 2024-10-01 PROCEDURE — P9021 RED BLOOD CELLS UNIT: HCPCS | Performed by: HOSPITALIST

## 2024-10-01 PROCEDURE — 25000003 PHARM REV CODE 250: Performed by: STUDENT IN AN ORGANIZED HEALTH CARE EDUCATION/TRAINING PROGRAM

## 2024-10-01 PROCEDURE — 64446 NJX AA&/STRD SC NRV NFS IMG: CPT

## 2024-10-01 PROCEDURE — 88307 TISSUE EXAM BY PATHOLOGIST: CPT | Performed by: PATHOLOGY

## 2024-10-01 PROCEDURE — 25000003 PHARM REV CODE 250: Performed by: SURGERY

## 2024-10-01 PROCEDURE — 71000015 HC POSTOP RECOV 1ST HR: Performed by: ORTHOPAEDIC SURGERY

## 2024-10-01 PROCEDURE — 63600175 PHARM REV CODE 636 W HCPCS: Performed by: ORTHOPAEDIC SURGERY

## 2024-10-01 PROCEDURE — 36415 COLL VENOUS BLD VENIPUNCTURE: CPT | Performed by: HOSPITALIST

## 2024-10-01 PROCEDURE — 99232 SBSQ HOSP IP/OBS MODERATE 35: CPT | Mod: ,,, | Performed by: NURSE PRACTITIONER

## 2024-10-01 PROCEDURE — 36000711: Performed by: ORTHOPAEDIC SURGERY

## 2024-10-01 PROCEDURE — 71000033 HC RECOVERY, INTIAL HOUR: Performed by: ORTHOPAEDIC SURGERY

## 2024-10-01 PROCEDURE — D9220A PRA ANESTHESIA: Mod: CRNA,,, | Performed by: NURSE ANESTHETIST, CERTIFIED REGISTERED

## 2024-10-01 PROCEDURE — 82962 GLUCOSE BLOOD TEST: CPT

## 2024-10-01 PROCEDURE — 37000008 HC ANESTHESIA 1ST 15 MINUTES: Performed by: ORTHOPAEDIC SURGERY

## 2024-10-01 PROCEDURE — 36000710: Performed by: ORTHOPAEDIC SURGERY

## 2024-10-01 PROCEDURE — 27201423 OPTIME MED/SURG SUP & DEVICES STERILE SUPPLY: Performed by: ORTHOPAEDIC SURGERY

## 2024-10-01 PROCEDURE — 0Y6H0Z3 DETACHMENT AT RIGHT LOWER LEG, LOW, OPEN APPROACH: ICD-10-PCS | Performed by: ORTHOPAEDIC SURGERY

## 2024-10-01 PROCEDURE — 11000001 HC ACUTE MED/SURG PRIVATE ROOM

## 2024-10-01 PROCEDURE — 85027 COMPLETE CBC AUTOMATED: CPT | Performed by: HOSPITALIST

## 2024-10-01 RX ORDER — FENTANYL CITRATE 50 UG/ML
25-200 INJECTION, SOLUTION INTRAMUSCULAR; INTRAVENOUS
Status: DISCONTINUED | OUTPATIENT
Start: 2024-10-01 | End: 2024-10-01 | Stop reason: HOSPADM

## 2024-10-01 RX ORDER — HYDROCODONE BITARTRATE AND ACETAMINOPHEN 500; 5 MG/1; MG/1
TABLET ORAL
Status: DISCONTINUED | OUTPATIENT
Start: 2024-10-01 | End: 2024-10-04 | Stop reason: HOSPADM

## 2024-10-01 RX ORDER — ROPIVACAINE HYDROCHLORIDE 2 MG/ML
INJECTION, SOLUTION EPIDURAL; INFILTRATION; PERINEURAL CONTINUOUS
Status: DISCONTINUED | OUTPATIENT
Start: 2024-10-01 | End: 2024-10-03

## 2024-10-01 RX ORDER — MUPIROCIN 20 MG/G
OINTMENT TOPICAL
Status: DISCONTINUED | OUTPATIENT
Start: 2024-10-01 | End: 2024-10-01 | Stop reason: HOSPADM

## 2024-10-01 RX ORDER — INSULIN ASPART 100 [IU]/ML
3-8 INJECTION, SOLUTION INTRAVENOUS; SUBCUTANEOUS
Status: DISCONTINUED | OUTPATIENT
Start: 2024-10-01 | End: 2024-10-03

## 2024-10-01 RX ORDER — FENTANYL CITRATE 50 UG/ML
INJECTION, SOLUTION INTRAMUSCULAR; INTRAVENOUS
Status: DISCONTINUED | OUTPATIENT
Start: 2024-10-01 | End: 2024-10-01

## 2024-10-01 RX ORDER — HYDROMORPHONE HYDROCHLORIDE 1 MG/ML
INJECTION, SOLUTION INTRAMUSCULAR; INTRAVENOUS; SUBCUTANEOUS
Status: COMPLETED
Start: 2024-10-01 | End: 2024-10-01

## 2024-10-01 RX ORDER — SODIUM CHLORIDE 0.9 % (FLUSH) 0.9 %
10 SYRINGE (ML) INJECTION
Status: DISCONTINUED | OUTPATIENT
Start: 2024-10-01 | End: 2024-10-01 | Stop reason: HOSPADM

## 2024-10-01 RX ORDER — ONDANSETRON HYDROCHLORIDE 2 MG/ML
INJECTION, SOLUTION INTRAVENOUS
Status: DISCONTINUED | OUTPATIENT
Start: 2024-10-01 | End: 2024-10-01

## 2024-10-01 RX ORDER — BUPIVACAINE HYDROCHLORIDE 2.5 MG/ML
INJECTION, SOLUTION EPIDURAL; INFILTRATION; INTRACAUDAL
Status: COMPLETED | OUTPATIENT
Start: 2024-10-01 | End: 2024-10-01

## 2024-10-01 RX ORDER — HALOPERIDOL 5 MG/ML
0.5 INJECTION INTRAMUSCULAR EVERY 10 MIN PRN
Status: DISCONTINUED | OUTPATIENT
Start: 2024-10-01 | End: 2024-10-01 | Stop reason: HOSPADM

## 2024-10-01 RX ORDER — LIDOCAINE HYDROCHLORIDE 20 MG/ML
INJECTION, SOLUTION EPIDURAL; INFILTRATION; INTRACAUDAL; PERINEURAL
Status: DISCONTINUED | OUTPATIENT
Start: 2024-10-01 | End: 2024-10-01

## 2024-10-01 RX ORDER — CLINDAMYCIN PHOSPHATE 900 MG/50ML
900 INJECTION, SOLUTION INTRAVENOUS
Status: DISCONTINUED | OUTPATIENT
Start: 2024-10-01 | End: 2024-10-01

## 2024-10-01 RX ORDER — PHENYLEPHRINE HYDROCHLORIDE 10 MG/ML
INJECTION INTRAVENOUS
Status: DISCONTINUED | OUTPATIENT
Start: 2024-10-01 | End: 2024-10-01

## 2024-10-01 RX ORDER — ROPIVACAINE HYDROCHLORIDE 5 MG/ML
INJECTION, SOLUTION EPIDURAL; INFILTRATION; PERINEURAL
Status: COMPLETED | OUTPATIENT
Start: 2024-10-01 | End: 2024-10-01

## 2024-10-01 RX ORDER — PROPOFOL 10 MG/ML
VIAL (ML) INTRAVENOUS
Status: DISCONTINUED | OUTPATIENT
Start: 2024-10-01 | End: 2024-10-01

## 2024-10-01 RX ORDER — CLINDAMYCIN PHOSPHATE 900 MG/50ML
900 INJECTION, SOLUTION INTRAVENOUS
Status: DISCONTINUED | OUTPATIENT
Start: 2024-10-01 | End: 2024-10-01 | Stop reason: HOSPADM

## 2024-10-01 RX ORDER — HYDROMORPHONE HYDROCHLORIDE 1 MG/ML
0.2 INJECTION, SOLUTION INTRAMUSCULAR; INTRAVENOUS; SUBCUTANEOUS EVERY 5 MIN PRN
Status: DISCONTINUED | OUTPATIENT
Start: 2024-10-01 | End: 2024-10-01 | Stop reason: HOSPADM

## 2024-10-01 RX ORDER — MIDAZOLAM HYDROCHLORIDE 1 MG/ML
.5-4 INJECTION, SOLUTION INTRAMUSCULAR; INTRAVENOUS
Status: DISCONTINUED | OUTPATIENT
Start: 2024-10-01 | End: 2024-10-01 | Stop reason: HOSPADM

## 2024-10-01 RX ORDER — DEXMEDETOMIDINE HYDROCHLORIDE 100 UG/ML
INJECTION, SOLUTION INTRAVENOUS
Status: DISCONTINUED | OUTPATIENT
Start: 2024-10-01 | End: 2024-10-01

## 2024-10-01 RX ORDER — GLUCAGON 1 MG
1 KIT INJECTION
Status: DISCONTINUED | OUTPATIENT
Start: 2024-10-01 | End: 2024-10-01 | Stop reason: HOSPADM

## 2024-10-01 RX ORDER — ROCURONIUM BROMIDE 10 MG/ML
INJECTION, SOLUTION INTRAVENOUS
Status: DISCONTINUED | OUTPATIENT
Start: 2024-10-01 | End: 2024-10-01

## 2024-10-01 RX ORDER — VANCOMYCIN HYDROCHLORIDE 1 G/20ML
INJECTION, POWDER, LYOPHILIZED, FOR SOLUTION INTRAVENOUS
Status: DISCONTINUED | OUTPATIENT
Start: 2024-10-01 | End: 2024-10-01 | Stop reason: HOSPADM

## 2024-10-01 RX ADMIN — OXACILLIN 12 G: 2 INJECTION, POWDER, FOR SOLUTION INTRAMUSCULAR; INTRAVENOUS at 12:10

## 2024-10-01 RX ADMIN — DEXMEDETOMIDINE 12 MCG: 100 INJECTION, SOLUTION, CONCENTRATE INTRAVENOUS at 05:10

## 2024-10-01 RX ADMIN — HYDROMORPHONE HYDROCHLORIDE 0.2 MG: 1 INJECTION, SOLUTION INTRAMUSCULAR; INTRAVENOUS; SUBCUTANEOUS at 06:10

## 2024-10-01 RX ADMIN — ROPIVACAINE HYDROCHLORIDE 10 ML: 5 INJECTION EPIDURAL; INFILTRATION; PERINEURAL at 02:10

## 2024-10-01 RX ADMIN — METHOCARBAMOL 500 MG: 500 TABLET ORAL at 07:10

## 2024-10-01 RX ADMIN — ROCURONIUM BROMIDE 30 MG: 10 INJECTION, SOLUTION INTRAVENOUS at 03:10

## 2024-10-01 RX ADMIN — FENTANYL CITRATE 50 MCG: 50 INJECTION, SOLUTION INTRAMUSCULAR; INTRAVENOUS at 03:10

## 2024-10-01 RX ADMIN — BUPIVACAINE HYDROCHLORIDE 10 ML: 2.5 INJECTION, SOLUTION EPIDURAL; INFILTRATION; INTRACAUDAL; PERINEURAL at 02:10

## 2024-10-01 RX ADMIN — INSULIN GLARGINE 20 UNITS: 100 INJECTION, SOLUTION SUBCUTANEOUS at 09:10

## 2024-10-01 RX ADMIN — ONDANSETRON 4 MG: 2 INJECTION INTRAMUSCULAR; INTRAVENOUS at 05:10

## 2024-10-01 RX ADMIN — FENTANYL CITRATE 50 MCG: 50 INJECTION, SOLUTION INTRAMUSCULAR; INTRAVENOUS at 05:10

## 2024-10-01 RX ADMIN — SODIUM BICARBONATE 650 MG: 650 TABLET ORAL at 09:10

## 2024-10-01 RX ADMIN — ROCURONIUM BROMIDE 20 MG: 10 INJECTION, SOLUTION INTRAVENOUS at 04:10

## 2024-10-01 RX ADMIN — LOSARTAN POTASSIUM 50 MG: 25 TABLET, FILM COATED ORAL at 09:10

## 2024-10-01 RX ADMIN — CHOLECALCIFEROL TAB 25 MCG (1000 UNIT) 1000 UNITS: 25 TAB at 09:10

## 2024-10-01 RX ADMIN — PHENYLEPHRINE HYDROCHLORIDE 200 MCG: 10 INJECTION INTRAVENOUS at 04:10

## 2024-10-01 RX ADMIN — PANTOPRAZOLE SODIUM 40 MG: 40 TABLET, DELAYED RELEASE ORAL at 09:10

## 2024-10-01 RX ADMIN — TRAZODONE HYDROCHLORIDE 50 MG: 50 TABLET ORAL at 09:10

## 2024-10-01 RX ADMIN — ACETAMINOPHEN 1000 MG: 325 TABLET ORAL at 06:10

## 2024-10-01 RX ADMIN — LIDOCAINE HYDROCHLORIDE 100 MG: 20 INJECTION, SOLUTION EPIDURAL; INFILTRATION; INTRACAUDAL; PERINEURAL at 03:10

## 2024-10-01 RX ADMIN — PROPOFOL 100 MG: 10 INJECTION, EMULSION INTRAVENOUS at 03:10

## 2024-10-01 RX ADMIN — LORAZEPAM 0.5 MG: 0.5 TABLET ORAL at 06:10

## 2024-10-01 RX ADMIN — MORPHINE SULFATE 2 MG: 2 INJECTION, SOLUTION INTRAMUSCULAR; INTRAVENOUS at 07:10

## 2024-10-01 RX ADMIN — FENTANYL CITRATE 50 MCG: 50 INJECTION, SOLUTION INTRAMUSCULAR; INTRAVENOUS at 06:10

## 2024-10-01 RX ADMIN — SODIUM CHLORIDE: 0.9 INJECTION, SOLUTION INTRAVENOUS at 03:10

## 2024-10-01 RX ADMIN — ACETAMINOPHEN 1000 MG: 325 TABLET ORAL at 10:10

## 2024-10-01 RX ADMIN — PHENYLEPHRINE HYDROCHLORIDE 200 MCG: 10 INJECTION INTRAVENOUS at 03:10

## 2024-10-01 RX ADMIN — HYDRALAZINE HYDROCHLORIDE 50 MG: 50 TABLET ORAL at 06:10

## 2024-10-01 RX ADMIN — AMLODIPINE BESYLATE 10 MG: 10 TABLET ORAL at 09:10

## 2024-10-01 RX ADMIN — NORTRIPTYLINE HYDROCHLORIDE 50 MG: 25 CAPSULE ORAL at 09:10

## 2024-10-01 RX ADMIN — MUPIROCIN: 20 OINTMENT TOPICAL at 12:10

## 2024-10-01 RX ADMIN — HALOPERIDOL LACTATE 0.5 MG: 5 INJECTION, SOLUTION INTRAMUSCULAR at 07:10

## 2024-10-01 RX ADMIN — Medication: at 06:10

## 2024-10-01 NOTE — NURSING
Blood sugar 65 at the moment he is asymptomatic and says he is not hungry, refusing to have glucose tabs, but I was able to get him agree to milk and krystle riggins will recheck and continue to monitor

## 2024-10-01 NOTE — ANESTHESIA PROCEDURE NOTES
Peripheral Block    Patient location during procedure: pre-op   Block not for primary anesthetic.  Reason for block: at surgeon's request and post-op pain management   Post-op Pain Location: R leg      Staffing  Authorizing Provider: Zayra Tony MD  Performing Provider: Chiquita Pandey DO    Staffing  Performed by: Chiquita Pandey DO  Authorized by: Zayra Tony MD    Preanesthetic Checklist  Completed: patient identified, IV checked, site marked, risks and benefits discussed, surgical consent, monitors and equipment checked, pre-op evaluation and timeout performed  Peripheral Block  Patient position: supine  Prep: ChloraPrep  Patient monitoring: heart rate, cardiac monitor, continuous pulse ox, continuous capnometry and frequent blood pressure checks  Block type: adductor canal  Laterality: right  Injection technique: single shot  Needle  Needle type: Stimuplex   Needle gauge: 21 G  Needle length: 4 in  Needle localization: anatomical landmarks and ultrasound guidance   -ultrasound image captured on disc.  Assessment  Injection assessment: negative aspiration, negative parasthesia and local visualized surrounding nerve  Paresthesia pain: none  Heart rate change: no  Slow fractionated injection: yes    Medications:    Medications: bupivacaine (pf) (MARCAINE) injection 0.25% - Perineural   10 mL - 10/1/2024 2:05:00 PM    Additional Notes  VSS.  [facility] RN monitoring vitals throughout procedure.  Patient tolerated procedure well.

## 2024-10-01 NOTE — ANESTHESIA PROCEDURE NOTES
Intubation    Date/Time: 10/1/2024 3:14 PM    Performed by: Martin Og CRNA  Authorized by: Maria Del Rosario Souza MD    Intubation:     Induction:  Intravenous    Intubated:  Postinduction    Mask Ventilation:  Easy mask    Attempts:  1    Attempted By:  CRNA    Method of Intubation:  Direct    Blade:  [place] 2    Laryngeal View Grade: Grade I - full view of cords      Difficult Airway Encountered?: No      Complications:  None    Airway Device:  Oral endotracheal tube    Airway Device Size:  7.5    Style/Cuff Inflation:  Cuffed (inflated to minimal occlusive pressure)    Tube secured:  21    Secured at:  The teeth    Placement Verified By:  Capnometry    Complicating Factors:  None    Findings Post-Intubation:  BS equal bilateral and atraumatic/condition of teeth unchanged

## 2024-10-01 NOTE — ASSESSMENT & PLAN NOTE
Cortes Schroeder is a 64 y.o. male with PMH of DM, HTN  and right trimalleolar ankle fracture status post ex fix on 07/18 with subsequent definitive fixation on 07/26 admitted with right ankle wound dehiscence in the setting of uncontrolled diabetes.      He is s/p I&D of R ankle 09/06. Repeat I&D R medial ankle 09/09. 9/17 angiography and medial ankle wound vac exchange.   To OR 9/20 and 9/23 for repeat I&D and vac exchange. Hardware removed on 9/23.   Free flap aborted 9/26 due to poor vascular inflow. Plan is for right BKA today   NPO since midnight.     Pain control: multimodal regimen  PT/OT:  NWB RLE   DVT PPx: Lvx - held in prep for OR  Abx:  oxacillin continuous; ID following   Cultures:  ankle cx staph +    Dispo: plan for BKA today

## 2024-10-01 NOTE — ANESTHESIA PROCEDURE NOTES
Popliteal catheter    Patient location during procedure: pre-op   Block not for primary anesthetic.  Reason for block: at surgeon's request and post-op pain management   Post-op Pain Location: R leg post op pain control      Staffing  Authorizing Provider: Zayra Tony MD  Performing Provider: Chiquita Pandey DO    Staffing  Performed by: Chiquita Pandey DO  Authorized by: Zayra Tony MD    Preanesthetic Checklist  Completed: patient identified, IV checked, site marked, risks and benefits discussed, surgical consent, monitors and equipment checked, pre-op evaluation and timeout performed  Peripheral Block  Patient position: supine  Prep: ChloraPrep and site prepped and draped  Patient monitoring: heart rate, cardiac monitor, continuous pulse ox, continuous capnometry and frequent blood pressure checks  Block type: popliteal  Laterality: right  Injection technique: continuous  Needle  Needle type: Tuohy   Needle gauge: 18 G  Needle length: 3.5 in  Needle localization: anatomical landmarks and ultrasound guidance  Catheter type: non-stimulating  Catheter size: 20 G  Test dose: lidocaine 1.5% with Epi 1-to-200,000 and negative   -ultrasound image captured on disc.  Assessment  Injection assessment: negative aspiration, negative parasthesia and local visualized surrounding nerve  Paresthesia pain: none  Heart rate change: no  Slow fractionated injection: yes    Medications:    Medications: ropivacaine (NAROPIN) injection 0.5% - Perineural   10 mL - 10/1/2024 2:00:00 PM    Additional Notes  VSS.  [facility] RN monitoring vitals throughout procedure.  Patient tolerated procedure well.

## 2024-10-01 NOTE — TRANSFER OF CARE
"Anesthesia Transfer of Care Note    Patient: Cortes Schroeder    Procedure(s) Performed: Procedure(s) (LRB):  AMPUTATION, BELOW KNEE - RIGHT (Right)    Patient location: PACU    Anesthesia Type: general    Transport from OR: Transported from OR on 2-3 L/min O2 by NC with adequate spontaneous ventilation    Post pain: adequate analgesia    Post assessment: no apparent anesthetic complications    Post vital signs: stable    Level of consciousness: awake    Complications: none    Transfer of care protocol was followed      Last vitals: Visit Vitals  BP (!) 148/68   Pulse 97   Temp 37.2 °C (99 °F) (Temporal)   Resp (!) 22   Ht 5' 5" (1.651 m)   Wt 93 kg (205 lb)   SpO2 97%   BMI 34.11 kg/m²     "

## 2024-10-01 NOTE — PROGRESS NOTES
[facility] Medicine  Progress Note    Patient Name: Cortes Schroeder  MRN: [id number]  Patient Class: IP- Inpatient   Admission Date: 9/6/2024  Length of Stay: 25 days  Attending Physician: Maria Del Rosario Medina MD  Primary Care Provider: Andrew Sinclair Jr., MD        Subjective:     Principal Problem:Surgical wound breakdown        HPI:  Cortes Schroeder is a 64 y.o. M with PMHx of anxiety, T2DM, GERD, HLD, HTN who presented to ED for AMS. He had a fall in July that resulted in R trimalleolar fracture and L distal radius fracture. He had external fixation on 07/18 and then ORIF on 07/26 with Dr. Liz. He was prescribed clinda 300 mg on 08/28 for a ten day course. Patient was doing well when he acutely became altered and not acting like himself a few hours ago. Patient family member drove him here from [facility] for ortho consult. R medial ankle wound dehisced with redness and swelling around it. No CPM fever, cough, N/V/D or seizure.    In ED: T max 99.1. SIRS 2/4 with WBC 18 and . CMP notable for Na 127, Cr 1.7, . Phos 1.9. .3. BNP 73. Trop neg. A1c 8.5. R tib fib XR notes There are 2 abandoned anterior-posteriorly oriented pin tracks in the right mid tibial shaft.  On AP views, each of these pin tracks demonstrates a small faint internal density, possibly representing a sequestrum. Ortho and endocrine consulted. Given IV vanc and IV clindamycin. Given 2.7L IVFs. Admitted to hospital medicine for sepsis 2/2 infected hardware.     Overview/Hospital Course:  Cortes Schroeder is a 65 y/o male admitted to hospital medicine for sepsis related to right ankle from surgical wound infection in patient with recent ORIF of right ankle fracture done on 7/26/2024. Patient started on empiric IV Vancomycin and IV Cefepime and given IVF's as per sepsis protocol. Orthopedics consulted and patient taken to OR on 9/6/2024 and had irrigation debridement of right surgical incision and  placement of wound vac.Endocrine consulted to assist in management of his diabetes while in hospital. Blood cultures from admit returned with MSSA. Infectious Disease consulted. Wound culture returned positive for Group B streptococcus and MSSA. Echo done due to bacteremia without concern for vegetations. ID changed antibiotics to IV Oxacillin. Patient with new cough and worsening WBC. CTA chest negative for PE but notes small area of ground glass changes to RUL. Patient placed on 5 day course of po Doxycycline to treat as per ID and completed for possible pneumonia. Patient taken back to OR on 09/09/2024 with Ortho and had another irrigation and debridement and replacement of wound vac to right ankle. Plastics consulted for flap evaluation and recommended vascular evaluation. CTA right lower extremity done and showed moderate atherosclerosis and multifocal high grade stenosis throughout right calf arteries. Dietary consulted for malnutrition and started on Boost supplementation to maximize his nutrition for a flap and wound healing. Vascular surgery consulted per Plastics recs as they would like to pre-optimize blood flow prior to any free flap or pedicled propellar flap. Vascular recommended angiogram of right leg but patient developed DEEPALI. Nephrology consulted and suspected ATN related to contrast nephropathy. Patient placed on supportive care. DEEPALI resolved. Patyoanetn taken for unilateral angiogram by Vascular on 9/17 and underwent PTA of right posterior tibial artery and right anterior tibial artery. After revascularization of right leg pl;an to do flap but working on timing with Ortho and Plastics. Patient to go back to OR again on 9/20 for another irrigation and debridement and replacement of wound vac to right ankle wound by Ortho. Plastics plans propeller flap to right ankle on 9/25 and then will need to remain in hospital for 1 week after flap for dangling protocol and monitoring of flap per Plastics. Repeat  blood cultures from 9/8 and 9/10 no growth. Patient taken back to OR on 9/20 with Dr. Moe Liz and had another I&D of right ankle and wound and replacement of wound vac. Patient to remain non weight bearing post-op and Plastics plans flap placement while in hospital on 9/25. Patient to go back to OR on for another I&D and wound vac change on 9/23. Patient taken back to OR on 9/23 with Dr. Liz and had another I&D and wound vac change with Orthopedics. Ortho noted anterolateral foot eschar developing. Distal lateral wound breakdown and ortho opened up majority of lateral wound and excised surrounding skin and subcutaneous tissue. Ortho removed all ankle hardware. Wound vacs placed medially and laterally. Patient re-evaluated by Plastics on 9/25 and state due to lateral wound in addition to anterior wound no longer a candidate for propellar flap and plastic will need to do free flap to cover both wounds. Plastics concerned if no adequate vascular targets, will be unable to cover and BKA is only option. Patient not very happy. Patient to go to OR On 9/26 for free flap.     Interval History: going to OR today for BKA and he reports ready for surgery to start next chapter as been here for lengthy stay now and feeling very cooped up from being here and antsy to get to next phase of care. Is amenable to rehab placement and will reconsult PT/OT tomorrow after BKA for further therapies. Discussed with him that ID team will see him after surgery to discuss antibiotic recs as will have soruce control from ankle /OM but they may want to do ROBERTA to ensure no endocarditis given would be off antibiotics bfore 6 weeks in case there was any underdiagnosed. TTE normal, and no persistant bacteremia or seeding as entry portal was ankle to blood. No major duke criteria met. He said he is really not wanting to do other procedures after this and ID aware he may likely decline and will talk to him after surgery for final recs for  this/antibiotics. 1 U infusion as prep for surgery as Hg mid 7s. He reports plastics took vac off and said flank/donor site was looking good and they dont anticipate needing I and d intra-op with ortho.      Review of Systems   Constitutional:  Negative for fever.   Respiratory:  Negative for shortness of breath.    Cardiovascular:  Negative for chest pain.   Gastrointestinal:  Negative for nausea and vomiting.   Musculoskeletal:  Positive for arthralgias (Right ankle).   Psychiatric/Behavioral:  Negative for confusion.      Objective:     Vital Signs (Most Recent):  Temp: 97.5 °F (36.4 °C) (09/25/24 1540)  Pulse: 79 (09/25/24 1540)  Resp: 19 (09/25/24 1540)  BP: 158/76 (09/25/24 1540)  SpO2: 96 % (09/25/24 1540) on room air Vital Signs (24h Range):  Temp:  [97.6 °F (36.4 °C)-99 °F (37.2 °C)] 98.3 °F (36.8 °C)  Pulse:  [] 108  Resp:  [17-19] 19  SpO2:  [95 %-97 %] 96 %  BP: (125-167)/(58-74) 158/73     Weight: 93 kg (205 lb 0.4 oz)  Body mass index is 34.12 kg/m².    Intake/Output Summary (Last 24 hours) at 10/1/2024 1210  Last data filed at 10/1/2024 0619  Gross per 24 hour   Intake --   Output 675 ml   Net -675 ml         Physical Exam  Vitals and nursing note reviewed.   Constitutional:       General: He is awake. He is not in acute distress.     Appearance: Normal appearance. He is well-developed. He is obese.      Comments: Patient sitting up in bed and spirits improving   Eyes:      Conjunctiva/sclera: Conjunctivae normal.   Cardiovascular:      Rate and Rhythm: Normal rate and regular rhythm.      Heart sounds: Normal heart sounds. No murmur heard.  Pulmonary:      Effort: Pulmonary effort is normal. No respiratory distress.      Breath sounds: Normal breath sounds. No wheezing.   Abdominal:      General: Abdomen is flat. Bowel sounds are normal. There is no distension.      Palpations: Abdomen is soft.      Tenderness: There is no abdominal tenderness.      Comments: Bandages to left flank    Musculoskeletal:      Left lower leg: No edema.      Comments: Wound vac in place to right ankle and ACE bandage overlying wound vac.    Skin:     General: Skin is warm.      Findings: No erythema.   Neurological:      Mental Status: He is alert and oriented to person, place, and time.   Psychiatric:         Mood and Affect: Mood normal.         Behavior: Behavior normal. Behavior is cooperative.         Thought Content: Thought content normal.         Judgment: Judgment normal.             Significant Labs: CBC:   Recent Labs   Lab 09/30/24  0434 10/01/24  0236   WBC 10.26 10.36   HGB 7.8* 7.5*   HCT 24.5* 21.8*   * 477*     CMP:   Recent Labs   Lab 09/30/24  0435 10/01/24  0236    137   K 3.9 4.1    109   CO2 21* 20*   * 105   BUN 14 13   CREATININE 0.9 0.8   CALCIUM 7.8* 7.7*   PROT 5.2* 5.0*   ALBUMIN 1.6* 1.5*   BILITOT 0.3 0.2   ALKPHOS 141* 125   AST 14 12   ALT 10 10   ANIONGAP 8 8     Blood Sugars (AccuCheck):    Recent Labs     09/30/24  1614 09/30/24  2043 09/30/24  2241 09/30/24  2327 10/01/24  0734 10/01/24  1132   POCTGLUCOSE 122* 64* 65* 82 171* 191*       Significant Imaging: I have reviewed all pertinent imaging results/findings within the past 24 hours.    Assessment/Plan:      * Surgical wound breakdown  Closed trimalleolar fracture of right ankle s/p ORIF in 7/2024  Surgical site infection  63 yo male presenting with confusion and worsening redness, swelling and pain to right ankle. Patient with sepsis criteria on admit and right ankle wound felt to be source of infection.   - Ortho consulted and patient taken to OR 9/06/2024 for debridement of surgical wound with wound vac placed. Cultures taken and grew both MSSA and Group B streptococcus from wound. Patient taken back again to OR with Ortho with Dr. Liz and patient had another I&Dand wound vac change on 9/9/2024. Orthopedics took back to OR again on 9/20/2024 with Dr. Liz and underwent another I&D of right  ankle and wound and replacement of wound vac. Patient taken back to OR with OR again on 9/23 with Dr. Liz and had another I&D and wound vac change with Orthopedics. Ortho noted anterolateral foot eschar developing. Distal lateral wound breakdown and ortho opened up majority of lateral wound and excised surrounding skin and subcutaneous tissue. Ortho removed all ankle hardware. Wound vacs placed medially and laterally.   - Orthopedics recommended Plastics evaluation for free flap and as part of evaluation recommended Vascular evaluation. Vascular evaluated patient and angiogram of right leg done and PTA done and revascularization of right PT/AT. Plastics plan propeller flap on 9/25/2024 but unfortunately due to presence of lateral and anterior wounds to right ankle no longer candidate for propeller flap so now will need free flap to cover both wounds. Plastics plans to take to OR on 9/26 for free flap to right ankle wounds. Plastics reports if unable to do free flap then patient's only option will be a BKA.  He understands and will f/u further 9/26 OR recs and was in OR for 8 hours with attemptes at targets at least 3 times but was not successful unfortunately and now will plan for BKA with ortho.   Discussed with ID- Andrew Kearns- would cont 24 hours after surgery oxacillin now, and is checking with staff to see if owuld need to consider a ROBERTA to confirm no endocardits before stopping antibiotics as was covering for 6 weeks for osteo that would cover any presumed endocarditis in case had any infxn that wasn't seen on TTE and will f/u recs. ID- Jurgen pillai to see him after surgery to discuss. He may decline if rec as he is really not intersted in any more procedures after the BKA he said as he is anxious to move along with hospital stay and ID aware.  will likely need IP rehab to recover and will plan for PM and R consult after surgery and repeat PT/OT consult  -OR scheduled for 10/1 with dr liz  - Wound  cultures -- MSSA and Group B strep from right ankle.   - Blood cultures -- MSSA on admit but repeat blood cultures negative.   - ID consulted and following and appreciate recs:  - Continue IV Oxacillin 12 g every 24 hours continuous infusion. Plan for orals if hardware retained once completed.  - Anticipate 6 weeks of IV antibiotics from date of most recent washout.  - Continue PT/OT and non weight bearing to right lower extremity as per Ortho. Continue wound vac as per Ortho to surgical site.  - Pain controlled and continue multimodals.   - Continue Lovenox 40 mg subcutaneous daily for DVT prophylaxis.     Airway malacia  Noted on CT scan of chest. Patient with no acute issues and incidentally noted on CT scan of chest. Patient asymptomatic.       PAD (peripheral artery disease)  Present on admit. Patient noted to have high grade stenosis on CTA of right leg. Vascular surgery consulted and patient taken to OR and had unilateral angiogram of right leg and had PT/AT artery stenosis on 9/17 and underwent PTA of right PT/AT arteries. Appreciate Vascular surgery assistance on case.   Concern with wound breakdown in last week and if free flap not successful on 9/26 then only other option may be bka and unfortunately waws not successful and will f/u ortho recs as now BKA is plan  -s/p free flap attempt with plastics and did attempt donor flap from abdomen/flank area and so has 2 drains from this area post op in place and vac as well from donor site even though did not take, and still in place 9/28 and plastics managing      Acute blood loss anemia  1U 10/1 pre op for BKA as hg 7.5  Received 2 U during plastic surgery flap attempt on 9/27 with stable hg post op  - Hgb fluctuating and own to 7.6 on 9/25 from 8.3 on 9/24. No clinical signs of bleeding and will monitor.   - Anemia is likely due to acute blood loss which was from surgery. Most recent hemoglobin and hematocrit are listed below.  Recent Labs     09/29/24  0628  09/30/24  0434 10/01/24  0236   HGB 8.1* 7.8* 7.5*   HCT 24.7* 24.5* 21.8*       Plan  - Monitor serial CBC: Daily  - Transfuse PRBC if patient becomes hemodynamically unstable, symptomatic or H/H drops below 7/21.  - Patient has not received any PRBC transfusions to date  - Patient's anemia is currently stable and monitor. No indication for blood transfusion at this time.     Thrombocytosis  Improving with treatment of infection. Present on admit and related to infection causing increase in platelet count. Monitor daily CBC.       On pre-exposure prophylaxis for HIV  Patient on Truvada as outpatient but held in hospital due to elevated AST and ALT that has resolved. Plan to resume Truvada on discharge.     MSSA bacteremia  Bacteremia resolved.   - Blood cultures from admit grew MSSA. Repeat blood cultures done on 9/8 and 9/10 no growth.  - Infectious Disease consulted and patient placed on IV Oxacillin infusion as suspected source was right ankle surgical wound infection for bacteremia as grew MSSA from right ankle wound.   - Echo done without concern for vegetations.    Uncontrolled type 2 diabetes mellitus with hyperglycemia  Patient's FSGs controlled. Endocrine consulted and managing patient's diabetes in hospital and appreciate assistance. Patient on insulin pump at [facility] but not in hospital and on basal/prandial insulin in hospital as per Endocrine. Appreciate Endocrine assistance on this case.   Last A1c reviewed-   Lab Results   Component Value Date    LABA1C 10.8 (H) 08/15/2016    HGBA1C 8.5 (H) 09/06/2024     Most recent fingerstick glucose reviewed-   Recent Labs   Lab 09/24/24  1930 09/25/24  0733 09/25/24  1123 09/25/24  1542   POCTGLUCOSE 299* 260* 221* 107       Current correctional scale  Low  Maintain anti-hyperglycemic dose as follows-   Antihyperglycemics (From admission, onward)      Start     Stop Route Frequency Ordered    09/25/24 1000  insulin aspart U-100 pen 0-10 Units         -- SubQ Before  meals & nightly PRN 09/25/24 0901    09/25/24 0900  insulin glargine U-100 (Lantus) pen 20 Units         -- SubQ Daily 09/25/24 0844    09/20/24 1130  insulin aspart U-100 pen 2-8 Units         -- SubQ 3 times daily with meals 09/20/24 0844           - Endocrine consulted:  - At this time, recommend that the patient remain off of his pump since he cannot be controlled in the Auto mode. Patient does not interact with pump frequently and relies on the auto mode algorithm.   - Insulin dosing as per Endocrine recommendations.   - Hold oral antihyperglycemics during hospitalization   - Monitor blood sugars with meals and at bedtime in hospital.  - Target blood sugars 140-180 in hospital.  - Diabetic diet.     Closed trimalleolar fracture of right ankle  S/p ORIF 07/2024  Ortho consulted   - see surgical wound breakdown above  NWB    Class 1 obesity due to excess calories with serious comorbidity and body mass index (BMI) of 34.0 to 34.9 in adult  Body mass index is 34.12 kg/m². Morbid obesity complicates all aspects of disease management from diagnostic modalities to treatment. Weight loss encouraged and health benefits explained to patient.         Gastroesophageal reflux disease without esophagitis  Controlled. Continue Carafate every 6 hours po to treat.       Metabolic acidosis  Bicarb 11 on 92/7 unclear reasons, will check lactic acid and resume na bicarb  Resolved. Discontinue sodium bicarbonate tablets on 9/22.   Related to DEEPALI. Patient started on sodium bicarbonate tablets and will continue. Monitor daily HCO3 levels with labs.   Lactate never drawn but improved to 18 now. Will titrate down na bicarb pending trends 9/28      Uncontrolled type 2 diabetes mellitus with diabetic polyneuropathy, with long-term current use of insulin  Patient's FSGs are uncontrolled due to hyperglycemia on current medication regimen.  Last A1c reviewed-   Lab Results   Component Value Date    LABA1C 10.8 (H) 08/15/2016    HGBA1C 9.9  "(H) 07/18/2024     Most recent fingerstick glucose reviewed- No results for input(s): "POCTGLUCOSE" in the last 24 hours.  Current correctional scale  Low  Maintain anti-hyperglycemic dose as follows-   Antihyperglycemics (From admission, onward)      Start     Stop Route Frequency Ordered    09/06/24 0506  insulin aspart U-100 pen 0-5 Units         -- SubQ Every 6 hours PRN 09/06/24 0406          Hold Oral hypoglycemics while patient is in the hospital.    Anxiety  Had panic episodes and pulling at lines when he woke up in panic on 9/28 overnight. Start trazodone for sleep and has ativan prn if anxiety severe. Dc telemetry as has had no ectopy through 3+ weeks of admit and 1 less line to cause distress or panic        VTE Risk Mitigation (From admission, onward)           Ordered     IP VTE HIGH RISK PATIENT  Once         09/06/24 4529                    Discharge Planning   JAYLEEN: 10/3/2024     Code Status: Full Code   Is the patient medically ready for discharge?:     Reason for patient still in hospital (select all that apply): Patient trending condition  Discharge Plan A: Home Health                  Maria Del Rosario Medina MD  Department of Hospital Medicine   [facility] - Surgery    "

## 2024-10-01 NOTE — SUBJECTIVE & OBJECTIVE
Principal Problem:Surgical wound breakdown    Principal Orthopedic Problem: s/p I&D and wound vac placement on 09/06, 09/20     Interval History: NPO since midnight. To OR today for right BKA. Transfuse 1U prbc in prep for OR today - Hb  7.5.       Review of patient's allergies indicates:   Allergen Reactions    Invokana [canagliflozin] Anaphylaxis    Percocet [oxycodone-acetaminophen] Nausea Only and Hallucinations    Biaxin [clarithromycin]     Hydrocodone Other (See Comments)     Dizzy/nausea/hallucinations    Sulfa (sulfonamide antibiotics) Nausea Only and Rash       Current Facility-Administered Medications   Medication    0.9%  NaCl infusion (for blood administration)    0.9%  NaCl infusion (for blood administration)    0.9%  NaCl infusion (for blood administration)    acetaminophen tablet 1,000 mg    albuterol-ipratropium 2.5 mg-0.5 mg/3 mL nebulizer solution 3 mL    amLODIPine tablet 10 mg    calcium carbonate 200 mg calcium (500 mg) chewable tablet 1,000 mg    dextrose 10% bolus 125 mL 125 mL    dextrose 10% bolus 125 mL 125 mL    dextrose 10% bolus 125 mL 125 mL    dextrose 10% bolus 250 mL 250 mL    dextrose 10% bolus 250 mL 250 mL    dextrose 10% bolus 250 mL 250 mL    glucagon (human recombinant) injection 1 mg    glucagon (human recombinant) injection 1 mg    glucose chewable tablet 16 g    glucose chewable tablet 24 g    hydrALAZINE tablet 50 mg    hydrALAZINE tablet 50 mg    insulin aspart U-100 pen 0-10 Units    insulin aspart U-100 pen 5-8 Units    insulin glargine U-100 (Lantus) pen 20 Units    LORazepam tablet 0.5 mg    losartan tablet 50 mg    methocarbamoL tablet 500 mg    morphine injection 2 mg    morphine injection 4 mg    nortriptyline capsule 50 mg    ondansetron disintegrating tablet 8 mg    oxacillin 12 g in  mL CONTINUOUS INFUSION    pantoprazole EC tablet 40 mg    polyethylene glycol packet 17 g    prochlorperazine injection Soln 5 mg    senna tablet 8.6 mg    sodium bicarbonate  "tablet 650 mg    sucralfate 100 mg/mL suspension 1 g    traZODone tablet 50 mg    vitamin D 1000 units tablet 1,000 Units     Objective:     Vital Signs (Most Recent):  Temp: 98.8 °F (37.1 °C) (10/01/24 0356)  Pulse: 64 (10/01/24 0356)  Resp: 17 (10/01/24 0356)  BP: (!) 167/74 (10/01/24 0356)  SpO2: 96 % (10/01/24 0356) Vital Signs (24h Range):  Temp:  [97.3 °F (36.3 °C)-98.8 °F (37.1 °C)] 98.8 °F (37.1 °C)  Pulse:  [64-99] 64  Resp:  [17-19] 17  SpO2:  [95 %-97 %] 96 %  BP: (125-167)/(58-74) 167/74     Weight: 93 kg (205 lb 0.4 oz)  Height: 5' 5" (165.1 cm)  Body mass index is 34.12 kg/m².      Intake/Output Summary (Last 24 hours) at 10/1/2024 0509  Last data filed at 9/30/2024 1906  Gross per 24 hour   Intake --   Output 950 ml   Net -950 ml          Ortho/SPM Exam     AAOx4  NAD  Tachycardic  No increased WOB    RLE  Medial wound vac to good suction  Lateral side with with wound vac to good suction  Compartments soft/compressible  Sensation diminished (at baseline)   Active toe dorsiflexion & plantarflexion  WWP. Brisk cap refill      Significant Labs:     Recent Labs   Lab 10/01/24  0236   WBC 10.36   RBC 2.55*   HGB 7.5*   HCT 21.8*   *   MCV 86   MCH 29.4   MCHC 34.4         Significant Imaging: I have reviewed and interpreted all pertinent imaging results/findings.    "

## 2024-10-01 NOTE — OP NOTE
OP NOTE    DOS:  10/01/2024    Preop Dx: Nonhealing wounds right ankle with no flap option    Peripheral vascular disease in uncontrolled diabetes    Postop Dx: Nonhealing wounds right ankle with no flap option    Peripheral vascular disease in uncontrolled diabetes    Procedure: Right below-knee amputation    Surgeon: Moe Liz M.D.    Asst:  Walt Garza M.D    Anesthesia: GETA plus regional    EBL:  50 cc    IVF:  1000 cc crystalloid, 1 unit PRBC (chronic anemia)    Implants: None    Specimens: Right distal leg    Findings: Major vessels without great pulses.  Lateral vessels.  Good closure.    Dispo:  To PACU extubated/stable       Indications for Procedure:      64-year-old male sustained a right trimalleolar ankle fracture which was treated with open reduction internal fixation.  The patient had uncontrolled diabetes with a hemoglobin A1c of 10 and significant peripheral vascular disease.  He subsequently had wound breakdown in the ankle and multiple washouts.  An attempt was made at flap coverage after balloon angioplasty but his vasculature was too poor to allow for this.  He was also beginning to have breakdown on the foot even away from his prior surgical incisions.  At this point only options for below-knee amputation.  The risks, benefits and alternatives to surgery were discussed with the patient prior to going the operating room.  Informed consent was obtained.    Procedure in Detail:    Patient was identified in preoperative holding area the site was marked.  A regional catheter was placed.  Patient was wheeled to the operating room placed on the operating table in supine position.  General endotracheal anesthesia was induced and preoperative antibiotics were administered.  A nonsterile tourniquet was placed in the right lower extremity prepped and draped in sterile fashion.  A time-out was undertaken to confirm patient, side, site, surgery, surgeon and administration of preoperative  antibiotics.  All agreed we proceeded.  Leg was exsanguinated and the tourniquet was raised.      I drew out my flaps based on his prior surgical incisions.  I made a long posterior flap which connected with his prior staple line from his other surgeries.  I incised through the skin subcutaneous tissues circumferentially.  I incised the fascia with electrocautery throughout.  I dissected down to the level of the anterior tibia and I will around the posteromedial corner.  I dissected through the peroneals.  I found the superficial peroneal nerve and pulled slight tension on this bovied it and let it retract.  I then found the tibialis anterior neurovascular bundle and tied this with 2-0 silk sutures.    At this point I placed a [place] behind the tibia and made the tibial cut bevelling this.  I then went about a cm proximal to that and made the fibular cut.  I worked my way around the posterior portion of the flap.  I found the posterior tibial neurovascular bundle and ligated this with silk suture.  The peroneal had been clipped by vascular surgery and this is again tied.  I dissected through the posterior flap and then removed the leg for specimen.      At this point I debulked the soleus.  I then let the tourniquet down and hemostasis was obtained with electrocautery for some of the collateral vessels.  I irrigated copiously with normal saline solution.  I then beveled the edges of the bone cut with a saw to make a smooth surface.  I then again irrigated copiously with normal saline solution.    At this point I made 3 drill holes in the anterior portion of the tibia.  I ran Krackow sutures through the fascia Achilles distally and tied this through drill holes in the tibia creating a good muscular cap myodesis.  I then performed mild plasty with the remaining muscles around this invaginate them.  I placed vancomycin cefepime powder deep before doing that.  The next layer fascia was closed 0 Vicryl suture,  subcutaneous tissue with a combination of 2-0 and 3-0 Vicryl suture.  The skin was stapled.      At this point I placed a Prevena incisional wound VAC and ran the sponge also up the lateral aspect of the leg so that it could put the obey pad off of the incision.  I this gaining good suction seal.      All instrument and sponge counts were reported correct at the end of the case.  There were no complications.  The patient was extubated, awakened and taken to recovery room in stable condition.      Plan the patient:     Multimodal pain management limiting narcotics.  We will continue the perineural catheter for now.  I think he still needs to be treated with the antibiotics for the infection he had of the ankle for least a week or 2 given the infection he had in the area and the single stage amputation.    Moe Liz MD

## 2024-10-01 NOTE — PROGRESS NOTES
[facility] - [facility]  Endocrinology  Progress Note    Admit Date: 9/6/2024     Reason for Consult: Management of T2DM, Hyperglycemia      Surgical Procedure and Date: S/P irrigation debridement of RLE on 09/06/2024    Diabetes diagnosis year: 1995     Home Diabetes Medications:    Humalog via OmniPod insulin pump  Mounjaro 10 mg weekly     Current pump settings:  Basal 12 am to 2.3 and 6 am to 1.55  ICR to 3 at 12 am, 2 at 4 pm  ISF to 1:20   AIT 3 hrs  Target 110-120        Patient had anaphylaxis reaction to SGLT2 inhibitors specifically Invokana     How often checking glucose at home? Dexcom G6   BG readings on regimen: Average 210's per Dexcom  Hypoglycemia on the regimen?  Yes  Missed doses on regimen?  No     Diabetes Complications include:     Hyperglycemia and Diabetic peripheral neuropathy         Complicating diabetes co morbidities:   HLD, HTN, Obesity         HPI: 64 y.o. male presents to the ED w/ complaint of altered mental status. Hx of R ankle fracture with surgery over a month ago. Patient now presents with sepsis 2/2 R ankle infection s/p ORIF on 07/26. Started on IV vanc and cefepime. Given IVFs. Blood cultures pending. Brought to OR with ortho on 09/06 for irrigation debridement of RLE. Endocrine following for BG and type 2 diabetes.     Lab Results   Component Value Date    LABA1C 10.8 (H) 08/15/2016    HGBA1C 8.5 (H) 09/06/2024         Interval HPI:   Overnight events: No acute events overnight. Patient in room 541/541 A. Blood glucose stable. BG at, above, and below goal on current insulin regimen (SSI, prandial, and basal insulin ). Steroid use- None. 5 Days Post-Op  Renal function- Normal   Vasopressors-  None       Endocrine will continue to follow and manage insulin orders inpatient.         Diet NPO Except for: Sips with Medication     Eating:   NPO  Nausea: No  Hypoglycemia and intervention: Yes, noted overnight. Patient received the full 5 units of Novolog with dinner, but patient only  "ate 25% of meal. Adjusted insulin dosing with meals to assist with optimizing BG control based on intake.   Fever: No  TPN and/or TF: No      BP (!) 141/63   Pulse 105   Temp 98.3 °F (36.8 °C) (Oral)   Resp 18   Ht 5' 5" (1.651 m)   Wt 93 kg (205 lb 0.4 oz)   SpO2 95%   BMI 34.12 kg/m²     Labs Reviewed and Include    Recent Labs   Lab 10/01/24  0236      CALCIUM 7.7*   ALBUMIN 1.5*   PROT 5.0*      K 4.1   CO2 20*      BUN 13   CREATININE 0.8   ALKPHOS 125   ALT 10   AST 12   BILITOT 0.2     Lab Results   Component Value Date    WBC 10.36 10/01/2024    HGB 7.5 (L) 10/01/2024    HCT 21.8 (L) 10/01/2024    MCV 86 10/01/2024     (H) 10/01/2024     No results for input(s): "TSH", "FREET4" in the last 168 hours.  Lab Results   Component Value Date    HGBA1C 8.5 (H) 09/06/2024       Nutritional status:   Body mass index is 34.12 kg/m².  Lab Results   Component Value Date    ALBUMIN 1.5 (L) 10/01/2024    ALBUMIN 1.6 (L) 09/30/2024    ALBUMIN 1.6 (L) 09/29/2024     Lab Results   Component Value Date    PREALBUMIN 3 (L) 09/06/2024    PREALBUMIN 13 (L) 07/18/2024       Estimated Creatinine Clearance: 99.1 mL/min (based on SCr of 0.8 mg/dL).    Accu-Checks  Recent Labs     09/29/24  0721 09/29/24  1107 09/29/24  1628 09/29/24  2104 09/30/24  0707 09/30/24  1050 09/30/24  1614 09/30/24  2043 09/30/24  2241 09/30/24  2327   POCTGLUCOSE 150* 186* 321* 140* 167* 268* 122* 64* 65* 82       Current Medications and/or Treatments Impacting Glycemic Control  Immunotherapy:    Immunosuppressants       None          Steroids:   Hormones (From admission, onward)      None          Pressors:    Autonomic Drugs (From admission, onward)      None          Hyperglycemia/Diabetes Medications:   Antihyperglycemics (From admission, onward)      Start     Stop Route Frequency Ordered    10/01/24 0715  insulin aspart U-100 pen 3-8 Units         -- SubQ 3 times daily with meals 10/01/24 0639    09/27/24 1013  " insulin aspart U-100 pen 0-10 Units         -- SubQ Before meals & nightly PRN 09/27/24 0913    09/27/24 0900  insulin glargine U-100 (Lantus) pen 20 Units         -- SubQ Daily 09/27/24 0752            ASSESSMENT and PLAN    Cardiac/Vascular  Hyperlipidemia  On statin per ADA guidelines       Endocrine  Uncontrolled type 2 diabetes mellitus with hyperglycemia  BG goal 140-180    - Lantus (Insulin Glargine) 20 units daily   - Novolog 3-8 units TIDWM (Administer   3 units if patient eats 25% of meal, 5  units if patient eats 50% of meal, administer 6 units if the patient eats 75% of meal and administer    8   units if patient eats 100% of meal. Hold if patient eats less than 25% of meal).   - [facility] [place]150[place]25[place]  - BG checks AC/HS  - Hypoglycemia protocol in place    ** Please notify Endocrine for any change and/or advance in diet**  ** Please call Endocrine for any BG related issues **    Discharge Planning:   TBD. Please notify endocrinology prior to discharge.        Orthopedic  * Surgical wound breakdown  Optimize BG control to improve wound healing  Managed per primary team               Brandyn Malin, DNP, FNP  Endocrinology  [facility] - Surgery

## 2024-10-01 NOTE — SUBJECTIVE & OBJECTIVE
Interval History: going to OR today for BKA and he reports ready for surgery to start next chapter as been here for lengthy stay now and feeling very cooped up from being here and antsy to get to next phase of care. Is amenable to rehab placement and will reconsult PT/OT tomorrow after BKA for further therapies. Discussed with him that ID team will see him after surgery to discuss antibiotic recs as will have soruce control from ankle /OM but they may want to do ROBERTA to ensure no endocarditis given would be off antibiotics bfore 6 weeks in case there was any underdiagnosed. TTE normal, and no persistant bacteremia or seeding as entry portal was ankle to blood. No major duke criteria met. He said he is really not wanting to do other procedures after this and ID aware he may likely decline and will talk to him after surgery for final recs for this/antibiotics. 1 U infusion as prep for surgery as Hg mid 7s. He reports plastics took vac off and said flank/donor site was looking good and they dont anticipate needing I and d intra-op with ortho.      Review of Systems   Constitutional:  Negative for fever.   Respiratory:  Negative for shortness of breath.    Cardiovascular:  Negative for chest pain.   Gastrointestinal:  Negative for nausea and vomiting.   Musculoskeletal:  Positive for arthralgias (Right ankle).   Psychiatric/Behavioral:  Negative for confusion.      Objective:     Vital Signs (Most Recent):  Temp: 97.5 °F (36.4 °C) (09/25/24 1540)  Pulse: 79 (09/25/24 1540)  Resp: 19 (09/25/24 1540)  BP: 158/76 (09/25/24 1540)  SpO2: 96 % (09/25/24 1540) on room air Vital Signs (24h Range):  Temp:  [97.6 °F (36.4 °C)-99 °F (37.2 °C)] 98.3 °F (36.8 °C)  Pulse:  [] 108  Resp:  [17-19] 19  SpO2:  [95 %-97 %] 96 %  BP: (125-167)/(58-74) 158/73     Weight: 93 kg (205 lb 0.4 oz)  Body mass index is 34.12 kg/m².    Intake/Output Summary (Last 24 hours) at 10/1/2024 1210  Last data filed at 10/1/2024 0619  Gross per 24 hour    Intake --   Output 675 ml   Net -675 ml         Physical Exam  Vitals and nursing note reviewed.   Constitutional:       General: He is awake. He is not in acute distress.     Appearance: Normal appearance. He is well-developed. He is obese.      Comments: Patient sitting up in bed and spirits improving   Eyes:      Conjunctiva/sclera: Conjunctivae normal.   Cardiovascular:      Rate and Rhythm: Normal rate and regular rhythm.      Heart sounds: Normal heart sounds. No murmur heard.  Pulmonary:      Effort: Pulmonary effort is normal. No respiratory distress.      Breath sounds: Normal breath sounds. No wheezing.   Abdominal:      General: Abdomen is flat. Bowel sounds are normal. There is no distension.      Palpations: Abdomen is soft.      Tenderness: There is no abdominal tenderness.      Comments: Bandages to left flank   Musculoskeletal:      Left lower leg: No edema.      Comments: Wound vac in place to right ankle and ACE bandage overlying wound vac.    Skin:     General: Skin is warm.      Findings: No erythema.   Neurological:      Mental Status: He is alert and oriented to person, place, and time.   Psychiatric:         Mood and Affect: Mood normal.         Behavior: Behavior normal. Behavior is cooperative.         Thought Content: Thought content normal.         Judgment: Judgment normal.             Significant Labs: CBC:   Recent Labs   Lab 09/30/24  0434 10/01/24  0236   WBC 10.26 10.36   HGB 7.8* 7.5*   HCT 24.5* 21.8*   * 477*     CMP:   Recent Labs   Lab 09/30/24  0435 10/01/24  0236    137   K 3.9 4.1    109   CO2 21* 20*   * 105   BUN 14 13   CREATININE 0.9 0.8   CALCIUM 7.8* 7.7*   PROT 5.2* 5.0*   ALBUMIN 1.6* 1.5*   BILITOT 0.3 0.2   ALKPHOS 141* 125   AST 14 12   ALT 10 10   ANIONGAP 8 8     Blood Sugars (AccuCheck):    Recent Labs     09/30/24  1614 09/30/24  2043 09/30/24  2241 09/30/24  2327 10/01/24  0734 10/01/24  1132   POCTGLUCOSE 122* 64* 65* 82 171* 191*        Significant Imaging: I have reviewed all pertinent imaging results/findings within the past 24 hours.

## 2024-10-01 NOTE — ASSESSMENT & PLAN NOTE
1U 10/1 pre op for BKA as hg 7.5  Received 2 U during plastic surgery flap attempt on 9/27 with stable hg post op  - Hgb fluctuating and own to 7.6 on 9/25 from 8.3 on 9/24. No clinical signs of bleeding and will monitor.   - Anemia is likely due to acute blood loss which was from surgery. Most recent hemoglobin and hematocrit are listed below.  Recent Labs     09/29/24  0628 09/30/24  0434 10/01/24  0236   HGB 8.1* 7.8* 7.5*   HCT 24.7* 24.5* 21.8*       Plan  - Monitor serial CBC: Daily  - Transfuse PRBC if patient becomes hemodynamically unstable, symptomatic or H/H drops below 7/21.  - Patient has not received any PRBC transfusions to date  - Patient's anemia is currently stable and monitor. No indication for blood transfusion at this time.

## 2024-10-01 NOTE — PROGRESS NOTES
Plastic and Reconstructive Surgery   Progress Note    Subjective:     Vac removed this AM, back surgical site is intact, no need for debridement.     Objective:  Vital signs in last 24 hours:  Temp:  [97.3 °F (36.3 °C)-99 °F (37.2 °C)] 99 °F (37.2 °C)  Pulse:  [] 106  Resp:  [17-19] 18  SpO2:  [95 %-97 %] 96 %  BP: (125-167)/(58-74) 139/68    Intake/Output last 3 shifts:  I/O last 3 completed shifts:  In: -   Out: 1600 [Urine:1450; Other:150]    Intake/Output this shift:  No intake/output data recorded.        Physical Exam:  VITAL SIGNS:   Vitals:    10/01/24 0609 10/01/24 0625 10/01/24 0728 10/01/24 0755   BP: 135/63 (!) 141/63 139/68    BP Location:   Left arm    Patient Position:   Lying    Pulse: (!) 112 105 106    Resp:  18 18 18   Temp: 98.1 °F (36.7 °C) 98.3 °F (36.8 °C) 99 °F (37.2 °C)    TempSrc:  Oral Oral    SpO2:  95% 96%    Weight:       Height:         TMAX: Temp (24hrs), Av.1 °F (36.7 °C), Min:97.3 °F (36.3 °C), Max:99 °F (37.2 °C)      General: Alert; No acute distress  Cardiovascular: Regular rate   Respiratory: Normal respiratory effort. Chest rise symmetric.   Abdomen: Soft, nontender, nondistended  Extremity:  RLE vac in place , able to wiggle toes, sensation intact, splint in place, Wound vac - serosang  Incisional vac on back incision, adequate suction, minimal output, no hematoma/ seroma on back yet  Neurologic: No focal deficit. Speech normal      Scheduled Medications acetaminophen, 1,000 mg, Q8H  amLODIPine, 10 mg, Daily  hydrALAZINE, 50 mg, Q8H  insulin aspart U-100, 3-8 Units, TIDWM  insulin glargine U-100, 20 Units, Daily  losartan, 50 mg, Daily  nortriptyline, 50 mg, Nightly  oxacillin 12 g in  mL CONTINUOUS INFUSION, 12 g, Q24H  pantoprazole, 40 mg, Daily  polyethylene glycol, 17 g, BID  senna, 8.6 mg, BID  sodium bicarbonate, 650 mg, TID  sucralfate, 1 g, Q6H  traZODone, 50 mg, QHS  vitamin D, 1,000 Units, Daily        PRN Medications     Current Facility-Administered  Medications:     0.9%  NaCl infusion (for blood administration), , Intravenous, Q24H PRN    0.9%  NaCl infusion (for blood administration), , Intravenous, Q24H PRN    0.9%  NaCl infusion (for blood administration), , Intravenous, Q24H PRN    0.9%  NaCl infusion (for blood administration), , Intravenous, Q24H PRN    albuterol-ipratropium, 3 mL, Nebulization, Q4H PRN    calcium carbonate, 1,000 mg, Oral, TID PRN    dextrose 10%, 12.5 g, Intravenous, PRN    dextrose 10%, 12.5 g, Intravenous, PRN    dextrose 10%, 12.5 g, Intravenous, PRN    dextrose 10%, 25 g, Intravenous, PRN    dextrose 10%, 25 g, Intravenous, PRN    dextrose 10%, 25 g, Intravenous, PRN    glucagon (human recombinant), 1 mg, Intramuscular, PRN    glucagon (human recombinant), 1 mg, Intramuscular, PRN    glucose, 16 g, Oral, PRN    glucose, 24 g, Oral, PRN    hydrALAZINE, 50 mg, Oral, Q8H PRN    insulin aspart U-100, 0-10 Units, Subcutaneous, QID (AC + HS) PRN    LORazepam, 0.5 mg, Oral, Q6H PRN    methocarbamoL, 500 mg, Oral, TID PRN    morphine, 2 mg, Intravenous, Q6H PRN    morphine, 4 mg, Intravenous, Q6H PRN    ondansetron, 8 mg, Oral, Q8H PRN    prochlorperazine, 5 mg, Intravenous, Q30 Min PRN    Recent Labs:   Lab Results   Component Value Date    WBC 10.36 10/01/2024    HGB 7.5 (L) 10/01/2024    HCT 21.8 (L) 10/01/2024    MCV 86 10/01/2024     (H) 10/01/2024     Lab Results   Component Value Date     10/01/2024     10/01/2024    K 4.1 10/01/2024     10/01/2024    BUN 13 10/01/2024         Assessment:   64 y.o. y/o male s/p Procedure(s):  REPLACEMENT, WOUND VAC; white & black sponge  REMOVAL, HARDWARE, ANKLE      5 Days Post-Op     64 y.o.male W/ right ankle post-surgical medial wound dehiscence and exposed hardware.      Now s/p PTA RLE PT/AT, completion angiogram showing 3 vessel run off, also washout and vac exchange    S/p Attempted Parascapular flap- however calcified PT and poor inflow     Plan  - RLE wound vac  with seal in splint  - Reg diet  - Abx: per ID  - DVT ppx, IS, PT/OT  - Plan for BKA today with ortho  - Incision on back without tonya signs of ischemia, no breakdown. Will continue to monitor for now.       Pt was d/w attending surgeon, Dr. Rom Herman MD   Plastic Surgery Fellow  10/1/2024

## 2024-10-01 NOTE — ASSESSMENT & PLAN NOTE
Closed trimalleolar fracture of right ankle s/p ORIF in 7/2024  Surgical site infection  63 yo male presenting with confusion and worsening redness, swelling and pain to right ankle. Patient with sepsis criteria on admit and right ankle wound felt to be source of infection.   - Ortho consulted and patient taken to OR 9/06/2024 for debridement of surgical wound with wound vac placed. Cultures taken and grew both MSSA and Group B streptococcus from wound. Patient taken back again to OR with Ortho with Dr. Liz and patient had another I&Dand wound vac change on 9/9/2024. Orthopedics took back to OR again on 9/20/2024 with Dr. Liz and underwent another I&D of right ankle and wound and replacement of wound vac. Patient taken back to OR with OR again on 9/23 with Dr. Liz and had another I&D and wound vac change with Orthopedics. Ortho noted anterolateral foot eschar developing. Distal lateral wound breakdown and ortho opened up majority of lateral wound and excised surrounding skin and subcutaneous tissue. Ortho removed all ankle hardware. Wound vacs placed medially and laterally.   - Orthopedics recommended Plastics evaluation for free flap and as part of evaluation recommended Vascular evaluation. Vascular evaluated patient and angiogram of right leg done and PTA done and revascularization of right PT/AT. Plastics plan propeller flap on 9/25/2024 but unfortunately due to presence of lateral and anterior wounds to right ankle no longer candidate for propeller flap so now will need free flap to cover both wounds. Plastics plans to take to OR on 9/26 for free flap to right ankle wounds. Plastics reports if unable to do free flap then patient's only option will be a BKA.  He understands and will f/u further 9/26 OR recs and was in OR for 8 hours with attemptes at targets at least 3 times but was not successful unfortunately and now will plan for BKA with ortho.   Discussed with ID- Andrew Kearns- would cont 24  hours after surgery oxacillin now, and is checking with staff to see if owuld need to consider a ROBERTA to confirm no endocardits before stopping antibiotics as was covering for 6 weeks for osteo that would cover any presumed endocarditis in case had any infxn that wasn't seen on TTE and will f/u recs. ID- Jurgen rosas to see him after surgery to discuss. He may decline if rec as he is really not intersted in any more procedures after the BKA he said as he is anxious to move along with hospital stay and ID aware.  will likely need IP rehab to recover and will plan for PM and R consult after surgery and repeat PT/OT consult  -OR scheduled for 10/1 with dr velez  - Wound cultures -- MSSA and Group B strep from right ankle.   - Blood cultures -- MSSA on admit but repeat blood cultures negative.   - ID consulted and following and appreciate recs:  - Continue IV Oxacillin 12 g every 24 hours continuous infusion. Plan for orals if hardware retained once completed.  - Anticipate 6 weeks of IV antibiotics from date of most recent washout.  - Continue PT/OT and non weight bearing to right lower extremity as per Ortho. Continue wound vac as per Ortho to surgical site.  - Pain controlled and continue multimodals.   - Continue Lovenox 40 mg subcutaneous daily for DVT prophylaxis.

## 2024-10-01 NOTE — ASSESSMENT & PLAN NOTE
BG goal 140-180    - Lantus (Insulin Glargine) 20 units daily   - Novolog 3-8 units TIDWM (Administer   3 units if patient eats 25% of meal, 5  units if patient eats 50% of meal, administer 6 units if the patient eats 75% of meal and administer    8   units if patient eats 100% of meal. Hold if patient eats less than 25% of meal).   - [facility] [place]150[place]25[place]  - BG checks AC/HS  - Hypoglycemia protocol in place    ** Please notify Endocrine for any change and/or advance in diet**  ** Please call Endocrine for any BG related issues **    Discharge Planning:   TBD. Please notify endocrinology prior to discharge.

## 2024-10-01 NOTE — PROGRESS NOTES
[facility]  Orthopedics  Progress Note    Attg Note:  Patient seen and examined.  I agree with the resident's assessment and plan.  I had a very long discussion with the patient over the weekend and again.  At this point all viable options for coverage have been tried.  His vasculature distally is just not adequate for coverage and he has continued wound breakdown in that area secondary to his significant peripheral vascular disease.  We are going to proceed with a below-knee amputation.  I did discuss with him the fact that he may have issues with wound healing even with this surgery.  He has been on gabapentin which should help with phantom sensation and pain.    The risks, benefits and alternatives to surgery were discussed with the patient at great length.  These include bleeding, infection, vessel/nerve damage, pain, numbness, tingling, complex regional pain syndrome surgical failure, need for further surgery, wound healing problems, extra work of ambulation, fentanyl in his sensation/pain DVT, PE, arthritis and death.  Patient states an understanding and wishes to proceed with surgery.   All questions were answered.  No guarantees were implied or stated.  Informed consent was obtained.      Moe Liz MD      Patient Name: Cortes Schroeder  MRN: [id number]  Admission Date: 9/6/2024  Hospital Length of Stay: 25 days  Attending Provider: Maria Del Rosario Medina MD  Primary Care Provider: Andrew Sinclair Jr., MD  Follow-up For: Procedure(s) (LRB):  CREATION, FREE FLAP (Right)    Post-Operative Day: 5 Days Post-Op  Subjective:     Principal Problem:Surgical wound breakdown    Principal Orthopedic Problem: s/p I&D and wound vac placement on 09/06, 09/20     Interval History: NPO since midnight. To OR today for right BKA. Transfuse 1U prbc in prep for OR today - Hb  7.5.       Review of patient's allergies indicates:   Allergen Reactions    Invokana [canagliflozin] Anaphylaxis    Percocet  [oxycodone-acetaminophen] Nausea Only and Hallucinations    Biaxin [clarithromycin]     Hydrocodone Other (See Comments)     Dizzy/nausea/hallucinations    Sulfa (sulfonamide antibiotics) Nausea Only and Rash       Current Facility-Administered Medications   Medication    0.9%  NaCl infusion (for blood administration)    0.9%  NaCl infusion (for blood administration)    0.9%  NaCl infusion (for blood administration)    acetaminophen tablet 1,000 mg    albuterol-ipratropium 2.5 mg-0.5 mg/3 mL nebulizer solution 3 mL    amLODIPine tablet 10 mg    calcium carbonate 200 mg calcium (500 mg) chewable tablet 1,000 mg    dextrose 10% bolus 125 mL 125 mL    dextrose 10% bolus 125 mL 125 mL    dextrose 10% bolus 125 mL 125 mL    dextrose 10% bolus 250 mL 250 mL    dextrose 10% bolus 250 mL 250 mL    dextrose 10% bolus 250 mL 250 mL    glucagon (human recombinant) injection 1 mg    glucagon (human recombinant) injection 1 mg    glucose chewable tablet 16 g    glucose chewable tablet 24 g    hydrALAZINE tablet 50 mg    hydrALAZINE tablet 50 mg    insulin aspart U-100 pen 0-10 Units    insulin aspart U-100 pen 5-8 Units    insulin glargine U-100 (Lantus) pen 20 Units    LORazepam tablet 0.5 mg    losartan tablet 50 mg    methocarbamoL tablet 500 mg    morphine injection 2 mg    morphine injection 4 mg    nortriptyline capsule 50 mg    ondansetron disintegrating tablet 8 mg    oxacillin 12 g in  mL CONTINUOUS INFUSION    pantoprazole EC tablet 40 mg    polyethylene glycol packet 17 g    prochlorperazine injection Soln 5 mg    senna tablet 8.6 mg    sodium bicarbonate tablet 650 mg    sucralfate 100 mg/mL suspension 1 g    traZODone tablet 50 mg    vitamin D 1000 units tablet 1,000 Units     Objective:     Vital Signs (Most Recent):  Temp: 98.8 °F (37.1 °C) (10/01/24 0356)  Pulse: 64 (10/01/24 0356)  Resp: 17 (10/01/24 0356)  BP: (!) 167/74 (10/01/24 0356)  SpO2: 96 % (10/01/24 0356) Vital Signs (24h Range):  Temp:  [97.3 °F  "(36.3 °C)-98.8 °F (37.1 °C)] 98.8 °F (37.1 °C)  Pulse:  [64-99] 64  Resp:  [17-19] 17  SpO2:  [95 %-97 %] 96 %  BP: (125-167)/(58-74) 167/74     Weight: 93 kg (205 lb 0.4 oz)  Height: 5' 5" (165.1 cm)  Body mass index is 34.12 kg/m².      Intake/Output Summary (Last 24 hours) at 10/1/2024 0509  Last data filed at 9/30/2024 1906  Gross per 24 hour   Intake --   Output 950 ml   Net -950 ml          Ortho/SPM Exam     AAOx4  NAD  Tachycardic  No increased WOB    RLE  Medial wound vac to good suction  Lateral side with with wound vac to good suction  Compartments soft/compressible  Sensation diminished (at baseline)   Active toe dorsiflexion & plantarflexion  WWP. Brisk cap refill      Significant Labs:     Recent Labs   Lab 10/01/24  0236   WBC 10.36   RBC 2.55*   HGB 7.5*   HCT 21.8*   *   MCV 86   MCH 29.4   MCHC 34.4         Significant Imaging: I have reviewed and interpreted all pertinent imaging results/findings.    Assessment/Plan:     * Surgical wound breakdown  Cortes Schroeder is a 64 y.o. male with PMH of DM, HTN  and right trimalleolar ankle fracture status post ex fix on 07/18 with subsequent definitive fixation on 07/26 admitted with right ankle wound dehiscence in the setting of uncontrolled diabetes.      He is s/p I&D of R ankle 09/06. Repeat I&D R medial ankle 09/09. 9/17 angiography and medial ankle wound vac exchange.   To OR 9/20 and 9/23 for repeat I&D and vac exchange. Hardware removed on 9/23.   Free flap aborted 9/26 due to poor vascular inflow. Plan is for right BKA today   NPO since midnight.     Pain control: multimodal regimen  PT/OT:  NWB RLE   DVT PPx: Lvx - held in prep for OR  Abx:  oxacillin continuous; ID following   Cultures:  ankle cx staph +    Dispo: plan for BKA today           Artur Galvan MD  Orthopedics  [facility] - Surgery    "

## 2024-10-02 LAB
ALBUMIN SERPL BCP-MCNC: 1.7 G/DL (ref 3.5–5.2)
ALP SERPL-CCNC: 134 U/L (ref 55–135)
ALT SERPL W/O P-5'-P-CCNC: 7 U/L (ref 10–44)
ANION GAP SERPL CALC-SCNC: 8 MMOL/L (ref 8–16)
AST SERPL-CCNC: 24 U/L (ref 10–40)
BILIRUB SERPL-MCNC: 0.5 MG/DL (ref 0.1–1)
BUN SERPL-MCNC: 15 MG/DL (ref 8–23)
CALCIUM SERPL-MCNC: 8 MG/DL (ref 8.7–10.5)
CHLORIDE SERPL-SCNC: 109 MMOL/L (ref 95–110)
CO2 SERPL-SCNC: 20 MMOL/L (ref 23–29)
CREAT SERPL-MCNC: 0.9 MG/DL (ref 0.5–1.4)
ERYTHROCYTE [DISTWIDTH] IN BLOOD BY AUTOMATED COUNT: 17.6 % (ref 11.5–14.5)
EST. GFR  (NO RACE VARIABLE): >60 ML/MIN/1.73 M^2
GLUCOSE SERPL-MCNC: 92 MG/DL (ref 70–110)
HCT VFR BLD AUTO: 31.7 % (ref 40–54)
HGB BLD-MCNC: 10.2 G/DL (ref 14–18)
MCH RBC QN AUTO: 28.4 PG (ref 27–31)
MCHC RBC AUTO-ENTMCNC: 32.2 G/DL (ref 32–36)
MCV RBC AUTO: 88 FL (ref 82–98)
PLATELET # BLD AUTO: 497 K/UL (ref 150–450)
PMV BLD AUTO: 9.5 FL (ref 9.2–12.9)
POCT GLUCOSE: 105 MG/DL (ref 70–110)
POCT GLUCOSE: 49 MG/DL (ref 70–110)
POCT GLUCOSE: 59 MG/DL (ref 70–110)
POCT GLUCOSE: 85 MG/DL (ref 70–110)
POTASSIUM SERPL-SCNC: 4 MMOL/L (ref 3.5–5.1)
PROT SERPL-MCNC: 5.6 G/DL (ref 6–8.4)
RBC # BLD AUTO: 3.59 M/UL (ref 4.6–6.2)
SODIUM SERPL-SCNC: 137 MMOL/L (ref 136–145)
WBC # BLD AUTO: 13.53 K/UL (ref 3.9–12.7)

## 2024-10-02 PROCEDURE — 25000003 PHARM REV CODE 250

## 2024-10-02 PROCEDURE — 97535 SELF CARE MNGMENT TRAINING: CPT

## 2024-10-02 PROCEDURE — 11000001 HC ACUTE MED/SURG PRIVATE ROOM

## 2024-10-02 PROCEDURE — 36415 COLL VENOUS BLD VENIPUNCTURE: CPT | Performed by: SURGERY

## 2024-10-02 PROCEDURE — 99232 SBSQ HOSP IP/OBS MODERATE 35: CPT | Mod: ,,, | Performed by: NURSE PRACTITIONER

## 2024-10-02 PROCEDURE — 85027 COMPLETE CBC AUTOMATED: CPT | Performed by: SURGERY

## 2024-10-02 PROCEDURE — 25000003 PHARM REV CODE 250: Performed by: SURGERY

## 2024-10-02 PROCEDURE — 25000003 PHARM REV CODE 250: Performed by: HOSPITALIST

## 2024-10-02 PROCEDURE — 99233 SBSQ HOSP IP/OBS HIGH 50: CPT | Mod: ,,, | Performed by: NURSE PRACTITIONER

## 2024-10-02 PROCEDURE — 80053 COMPREHEN METABOLIC PANEL: CPT | Performed by: SURGERY

## 2024-10-02 PROCEDURE — 63600175 PHARM REV CODE 636 W HCPCS: Performed by: SURGERY

## 2024-10-02 PROCEDURE — 99222 1ST HOSP IP/OBS MODERATE 55: CPT | Mod: ,,, | Performed by: NURSE PRACTITIONER

## 2024-10-02 PROCEDURE — 97168 OT RE-EVAL EST PLAN CARE: CPT

## 2024-10-02 PROCEDURE — 97116 GAIT TRAINING THERAPY: CPT

## 2024-10-02 PROCEDURE — 97164 PT RE-EVAL EST PLAN CARE: CPT

## 2024-10-02 PROCEDURE — 99231 SBSQ HOSP IP/OBS SF/LOW 25: CPT | Mod: ,,, | Performed by: STUDENT IN AN ORGANIZED HEALTH CARE EDUCATION/TRAINING PROGRAM

## 2024-10-02 RX ORDER — ASPIRIN 81 MG/1
81 TABLET ORAL DAILY
Status: DISCONTINUED | OUTPATIENT
Start: 2024-10-03 | End: 2024-10-04 | Stop reason: HOSPADM

## 2024-10-02 RX ORDER — TALC
6 POWDER (GRAM) TOPICAL NIGHTLY
Status: DISCONTINUED | OUTPATIENT
Start: 2024-10-02 | End: 2024-10-04 | Stop reason: HOSPADM

## 2024-10-02 RX ORDER — GABAPENTIN 300 MG/1
300 CAPSULE ORAL 2 TIMES DAILY
Status: DISCONTINUED | OUTPATIENT
Start: 2024-10-02 | End: 2024-10-04 | Stop reason: HOSPADM

## 2024-10-02 RX ORDER — CELECOXIB 200 MG/1
200 CAPSULE ORAL DAILY
Status: DISCONTINUED | OUTPATIENT
Start: 2024-10-02 | End: 2024-10-04 | Stop reason: HOSPADM

## 2024-10-02 RX ORDER — METHOCARBAMOL 500 MG/1
500 TABLET, FILM COATED ORAL EVERY 8 HOURS PRN
Status: DISCONTINUED | OUTPATIENT
Start: 2024-10-02 | End: 2024-10-02

## 2024-10-02 RX ORDER — ACETAMINOPHEN 500 MG
1000 TABLET ORAL EVERY 6 HOURS
Status: DISCONTINUED | OUTPATIENT
Start: 2024-10-02 | End: 2024-10-03

## 2024-10-02 RX ORDER — METHOCARBAMOL 500 MG/1
500 TABLET, FILM COATED ORAL EVERY 6 HOURS
Status: DISCONTINUED | OUTPATIENT
Start: 2024-10-02 | End: 2024-10-03

## 2024-10-02 RX ORDER — PREGABALIN 50 MG/1
50 CAPSULE ORAL 2 TIMES DAILY
Status: DISCONTINUED | OUTPATIENT
Start: 2024-10-02 | End: 2024-10-02

## 2024-10-02 RX ADMIN — APIXABAN 2.5 MG: 2.5 TABLET, FILM COATED ORAL at 12:10

## 2024-10-02 RX ADMIN — OXACILLIN 12 G: 2 INJECTION, POWDER, FOR SOLUTION INTRAMUSCULAR; INTRAVENOUS at 01:10

## 2024-10-02 RX ADMIN — METHOCARBAMOL 500 MG: 500 TABLET ORAL at 12:10

## 2024-10-02 RX ADMIN — INSULIN ASPART 6 UNITS: 100 INJECTION, SOLUTION INTRAVENOUS; SUBCUTANEOUS at 08:10

## 2024-10-02 RX ADMIN — SODIUM BICARBONATE 650 MG: 650 TABLET ORAL at 09:10

## 2024-10-02 RX ADMIN — METHOCARBAMOL 500 MG: 500 TABLET ORAL at 05:10

## 2024-10-02 RX ADMIN — ACETAMINOPHEN 1000 MG: 500 TABLET ORAL at 12:10

## 2024-10-02 RX ADMIN — ACETAMINOPHEN 1000 MG: 500 TABLET ORAL at 05:10

## 2024-10-02 RX ADMIN — MORPHINE SULFATE 4 MG: 4 INJECTION INTRAVENOUS at 08:10

## 2024-10-02 RX ADMIN — DEXTROSE MONOHYDRATE 125 ML: 100 INJECTION, SOLUTION INTRAVENOUS at 05:10

## 2024-10-02 RX ADMIN — GABAPENTIN 300 MG: 300 CAPSULE ORAL at 12:10

## 2024-10-02 RX ADMIN — ACETAMINOPHEN 1000 MG: 325 TABLET ORAL at 05:10

## 2024-10-02 RX ADMIN — METHOCARBAMOL 500 MG: 500 TABLET ORAL at 11:10

## 2024-10-02 RX ADMIN — SUCRALFATE 1 G: 1 SUSPENSION ORAL at 05:10

## 2024-10-02 RX ADMIN — TRAZODONE HYDROCHLORIDE 50 MG: 50 TABLET ORAL at 09:10

## 2024-10-02 RX ADMIN — SUCRALFATE 1 G: 1 SUSPENSION ORAL at 12:10

## 2024-10-02 RX ADMIN — HYDRALAZINE HYDROCHLORIDE 50 MG: 50 TABLET ORAL at 03:10

## 2024-10-02 RX ADMIN — Medication 6 MG: at 09:10

## 2024-10-02 RX ADMIN — LOSARTAN POTASSIUM 50 MG: 25 TABLET, FILM COATED ORAL at 09:10

## 2024-10-02 RX ADMIN — HYDRALAZINE HYDROCHLORIDE 50 MG: 50 TABLET ORAL at 05:10

## 2024-10-02 RX ADMIN — HYDRALAZINE HYDROCHLORIDE 50 MG: 50 TABLET ORAL at 09:10

## 2024-10-02 RX ADMIN — NORTRIPTYLINE HYDROCHLORIDE 50 MG: 25 CAPSULE ORAL at 09:10

## 2024-10-02 RX ADMIN — METHOCARBAMOL 500 MG: 500 TABLET ORAL at 08:10

## 2024-10-02 RX ADMIN — AMLODIPINE BESYLATE 10 MG: 10 TABLET ORAL at 09:10

## 2024-10-02 RX ADMIN — CELECOXIB 200 MG: 200 CAPSULE ORAL at 12:10

## 2024-10-02 RX ADMIN — CHOLECALCIFEROL TAB 25 MCG (1000 UNIT) 1000 UNITS: 25 TAB at 09:10

## 2024-10-02 RX ADMIN — ACETAMINOPHEN 1000 MG: 500 TABLET ORAL at 11:10

## 2024-10-02 RX ADMIN — APIXABAN 2.5 MG: 2.5 TABLET, FILM COATED ORAL at 09:10

## 2024-10-02 RX ADMIN — GABAPENTIN 300 MG: 300 CAPSULE ORAL at 09:10

## 2024-10-02 RX ADMIN — INSULIN GLARGINE 20 UNITS: 100 INJECTION, SOLUTION SUBCUTANEOUS at 08:10

## 2024-10-02 NOTE — ASSESSMENT & PLAN NOTE
Cortes Schroeder is a 64 y.o. male with PMH of DM, HTN  and right trimalleolar ankle fracture status post ex fix on 07/18 with subsequent definitive fixation on 07/26 admitted with right ankle wound dehiscence in the setting of uncontrolled diabetes.      He is s/p I&D of R ankle 09/06. Repeat I&D R medial ankle 09/09. 9/17 angiography and medial ankle wound vac exchange.   To OR 9/20 and 9/23 for repeat I&D and vac exchange. Hardware removed on 9/23.   Free flap aborted 9/26 due to poor vascular inflow.     S/p R BKA 10/1.    Pain control: multimodal regimen  PT/OT:  NWB RLE   DVT PPx: eliquis 2.5mg BID  Abx:  oxacillin continuous; ID following   Cultures:  ankle cx staph +    Dispo: pending pain control and PT

## 2024-10-02 NOTE — SUBJECTIVE & OBJECTIVE
"Interval History: he reports feeling okay so far this morning with roommate hugo at bedside as well as anesthesia pain team, with some pain reported and anesthesia to likely change meds around with PNC in place. Received 1 U pre op and 1 intra-op yesterday with hg stable at 10 now. He is hungry for breakfast and just delivered as came out of OR later and didn't get any food last night after OR. Prevena to stump site now and other vac from left flank removed by plastics who reports site looking well on exam. Ortho notes report mayneed short antibotic course post amputation given exposure in OR with 1 stage surgery, ID to see him post op now to discuss antibitoics/ possible ROBERTA if going off before planned 6 weeks previous date.  Previous end date was 10/21 and if ortho wants 2 weeks then the date is extremely close to the initial date anwyays, so will f/u with ID further re plans for this. Pt/ot onsulted as likely will need to pursue rehab post BKA and will consult PM and R pending recs. He had some slight cofusion today as was reported to have some in pacu and he said that they told him in pacu that he "kept asking for an [employer] catalogue" and he didn't know why he would do that. I told him I dont think that is a thing, but he seemed to be slightly a bit off baseline just a tiny bit when referencing the episode of confusion in pacu he was told about.    Review of Systems   Constitutional:  Negative for fever.   Respiratory:  Negative for shortness of breath.    Cardiovascular:  Negative for chest pain.   Gastrointestinal:  Negative for nausea and vomiting.   Musculoskeletal:  Positive for arthralgias (Right ankle).   Psychiatric/Behavioral:  Negative for confusion.      Objective:     Vital Signs (Most Recent):  Temp: 97.5 °F (36.4 °C) (09/25/24 1540)  Pulse: 79 (09/25/24 1540)  Resp: 19 (09/25/24 1540)  BP: 158/76 (09/25/24 1540)  SpO2: 96 % (09/25/24 1540) on room air Vital Signs (24h Range):  Temp:  [98 °F (36.7 " °C)-99.3 °F (37.4 °C)] 99.3 °F (37.4 °C)  Pulse:  [] 110  Resp:  [15-25] 16  SpO2:  [94 %-98 %] 96 %  BP: (126-203)/(59-85) 150/65     Weight: 93 kg (204 lb 15.7 oz)  Body mass index is 34.11 kg/m².    Intake/Output Summary (Last 24 hours) at 10/2/2024 1028  Last data filed at 10/2/2024 0500  Gross per 24 hour   Intake 1873 ml   Output 600 ml   Net 1273 ml         Physical Exam  Vitals and nursing note reviewed.   Constitutional:       General: He is awake. He is not in acute distress.     Appearance: Normal appearance. He is well-developed. He is obese.      Comments: Patient sitting up in bed and spirits improving   Eyes:      Conjunctiva/sclera: Conjunctivae normal.   Cardiovascular:      Rate and Rhythm: Normal rate and regular rhythm.      Heart sounds: Normal heart sounds. No murmur heard.  Pulmonary:      Effort: Pulmonary effort is normal. No respiratory distress.      Breath sounds: Normal breath sounds. No wheezing.   Abdominal:      General: Abdomen is flat. Bowel sounds are normal. There is no distension.      Palpations: Abdomen is soft.      Tenderness: There is no abdominal tenderness.      Comments: Bandages to left flank   Musculoskeletal:      Left lower leg: No edema.      Comments: S/p BKA to RLE with bandages throughotu stump site and wound vac in place   Skin:     General: Skin is warm.      Findings: No erythema.   Neurological:      Mental Status: He is alert and oriented to person, place, and time.   Psychiatric:         Mood and Affect: Mood normal.         Behavior: Behavior normal. Behavior is cooperative.         Thought Content: Thought content normal.         Judgment: Judgment normal.             Significant Labs: CBC:   Recent Labs   Lab 10/01/24  0236 10/02/24  0230   WBC 10.36 13.53*   HGB 7.5* 10.2*   HCT 21.8* 31.7*   * 497*     CMP:   Recent Labs   Lab 10/01/24  0236 10/02/24  0230    137   K 4.1 4.0    109   CO2 20* 20*    92   BUN 13 15    CREATININE 0.8 0.9   CALCIUM 7.7* 8.0*   PROT 5.0* 5.6*   ALBUMIN 1.5* 1.7*   BILITOT 0.2 0.5   ALKPHOS 125 134   AST 12 24   ALT 10 7*   ANIONGAP 8 8     Blood Sugars (AccuCheck):    Recent Labs     09/30/24  2327 10/01/24  0734 10/01/24  1132 10/01/24  1840 10/01/24  2038 10/02/24  0758   POCTGLUCOSE 82 171* 191* 85 111* 105       Significant Imaging: I have reviewed all pertinent imaging results/findings within the past 24 hours.

## 2024-10-02 NOTE — PROGRESS NOTES
[facility]  Orthopedics  Progress Note    Attg Note:  I agree with the resident's assessment and plan.    Moe Liz MD      Patient Name: Cortes Schroeder  MRN: [id number]  Admission Date: 9/6/2024  Hospital Length of Stay: 26 days  Attending Provider: Maria Del Rosario Medina MD  Primary Care Provider: Andrew Sinclair Jr., MD  Follow-up For: Procedure(s) (LRB):  AMPUTATION, BELOW KNEE - RIGHT (Right)    Post-Operative Day: 1 Day Post-Op  Subjective:     Principal Problem:Surgical wound breakdown    Principal Orthopedic Problem: As above, s/p R BKA 10/1    Interval History: Patient seen and examined at bedside. NAEON. Afebrile, VSS. Patient doing well. No complaints. Pain well controlled. Patient ambulated 9 ft with PT today. Encouraged to continue working with therapy. Hgb 10.2. Wound vac with good seal.      Review of patient's allergies indicates:   Allergen Reactions    Invokana [canagliflozin] Anaphylaxis    Percocet [oxycodone-acetaminophen] Nausea Only and Hallucinations    Biaxin [clarithromycin]     Hydrocodone Other (See Comments)     Dizzy/nausea/hallucinations    Sulfa (sulfonamide antibiotics) Nausea Only and Rash       Current Facility-Administered Medications   Medication    0.9%  NaCl infusion (for blood administration)    0.9%  NaCl infusion (for blood administration)    0.9%  NaCl infusion (for blood administration)    0.9%  NaCl infusion (for blood administration)    acetaminophen tablet 1,000 mg    albuterol-ipratropium 2.5 mg-0.5 mg/3 mL nebulizer solution 3 mL    amLODIPine tablet 10 mg    apixaban tablet 2.5 mg    [START ON 10/3/2024] aspirin EC tablet 81 mg    calcium carbonate 200 mg calcium (500 mg) chewable tablet 1,000 mg    celecoxib capsule 200 mg    dextrose 10% bolus 125 mL 125 mL    dextrose 10% bolus 125 mL 125 mL    dextrose 10% bolus 125 mL 125 mL    dextrose 10% bolus 125 mL 125 mL    dextrose 10% bolus 250 mL 250 mL    dextrose 10% bolus 250 mL 250 mL    dextrose 10%  "bolus 250 mL 250 mL    dextrose 10% bolus 250 mL 250 mL    gabapentin capsule 300 mg    glucagon (human recombinant) injection 1 mg    glucagon (human recombinant) injection 1 mg    glucose chewable tablet 16 g    glucose chewable tablet 24 g    hydrALAZINE tablet 50 mg    hydrALAZINE tablet 50 mg    insulin aspart U-100 pen 0-10 Units    insulin aspart U-100 pen 3-8 Units    insulin glargine U-100 (Lantus) pen 20 Units    LORazepam tablet 0.5 mg    losartan tablet 50 mg    melatonin tablet 6 mg    methocarbamoL tablet 500 mg    nortriptyline capsule 50 mg    ondansetron disintegrating tablet 8 mg    oxacillin 12 g in  mL CONTINUOUS INFUSION    pantoprazole EC tablet 40 mg    polyethylene glycol packet 17 g    prochlorperazine injection Soln 5 mg    ropivacaine 0.2% Perineural Pump infusion 500 ML    senna tablet 8.6 mg    sodium bicarbonate tablet 650 mg    sucralfate 100 mg/mL suspension 1 g    traZODone tablet 50 mg    vitamin D 1000 units tablet 1,000 Units     Objective:     Vital Signs (Most Recent):  Temp: 98.1 °F (36.7 °C) (10/02/24 1522)  Pulse: 99 (10/02/24 1522)  Resp: 18 (10/02/24 1522)  BP: (!) 162/71 (10/02/24 1522)  SpO2: 95 % (10/02/24 1522) Vital Signs (24h Range):  Temp:  [98 °F (36.7 °C)-99.3 °F (37.4 °C)] 98.1 °F (36.7 °C)  Pulse:  [] 99  Resp:  [15-25] 18  SpO2:  [94 %-97 %] 95 %  BP: (126-203)/(59-85) 162/71     Weight: 93 kg (204 lb 15.7 oz)  Height: 5' 5" (165.1 cm)  Body mass index is 34.11 kg/m².      Intake/Output Summary (Last 24 hours) at 10/2/2024 1643  Last data filed at 10/2/2024 1503  Gross per 24 hour   Intake 1873 ml   Output 600 ml   Net 1273 ml        Ortho/SPM Exam  A&O x 3  Regular Rate  Non-Labored Respirations    RLE:  Prevena with good seal and suction  Able to flex/extend knee with minimal pain  SILT  Compartments soft         Significant Labs: CBC:   Recent Labs   Lab 10/01/24  0236 10/02/24  0230   WBC 10.36 13.53*   HGB 7.5* 10.2*   HCT 21.8* 31.7*   * " 497*     CMP:   Recent Labs   Lab 10/01/24  0236 10/02/24  0230    137   K 4.1 4.0    109   CO2 20* 20*    92   BUN 13 15   CREATININE 0.8 0.9   CALCIUM 7.7* 8.0*   PROT 5.0* 5.6*   ALBUMIN 1.5* 1.7*   BILITOT 0.2 0.5   ALKPHOS 125 134   AST 12 24   ALT 10 7*   ANIONGAP 8 8     All pertinent labs within the past 24 hours have been reviewed.    Significant Imaging: I have reviewed and interpreted all pertinent imaging results/findings.  Assessment/Plan:     * Surgical wound breakdown  Cortes Schroeder is a 64 y.o. male with PMH of DM, HTN  and right trimalleolar ankle fracture status post ex fix on 07/18 with subsequent definitive fixation on 07/26 admitted with right ankle wound dehiscence in the setting of uncontrolled diabetes.      He is s/p I&D of R ankle 09/06. Repeat I&D R medial ankle 09/09. 9/17 angiography and medial ankle wound vac exchange.   To OR 9/20 and 9/23 for repeat I&D and vac exchange. Hardware removed on 9/23.   Free flap aborted 9/26 due to poor vascular inflow.     S/p R BKA 10/1.    Pain control: multimodal regimen  PT/OT:  NWB RLE   DVT PPx: eliquis 2.5mg BID  Abx:  oxacillin continuous; ID following   Cultures:  ankle cx staph +    Dispo: pending pain control and PT          SONA Price MD  Orthopedics  [facility] - Surgery

## 2024-10-02 NOTE — NURSING
Patient returned to unit from PACU. AAOX4, VSS. GEETHAKA noted with one wound vac attached. NADN. Bed in lowest position, call light in reach along with personal items. Plan of care ongoing.

## 2024-10-02 NOTE — PROGRESS NOTES
Pt arrived to pacu agitated and with delirium noted. Anesthesia notified and pacu orders placed. Dr Liz later arrived to bedside and requested that perineural be hand bolused. Anesthesia notified bolus given by MD. Delirium slowly resolving and pt not as agitated as previously observed. Shift report given to FRANCHESKA Crowe at bedside.

## 2024-10-02 NOTE — PROGRESS NOTES
Quality 110: Preventive Care And Screening: Influenza Immunization: Influenza immunization was not ordered or administered, reason not given [facility] Medicine  Progress Note    Patient Name: Cortes Schroeder  MRN: [id number]  Patient Class: IP- Inpatient   Admission Date: 9/6/2024  Length of Stay: 26 days  Attending Physician: Maria Del Rosario Medina MD  Primary Care Provider: Andrew Sinclair Jr., MD        Subjective:     Principal Problem:Surgical wound breakdown        HPI:  Cortes Schroeder is a 64 y.o. M with PMHx of anxiety, T2DM, GERD, HLD, HTN who presented to ED for AMS. He had a fall in July that resulted in R trimalleolar fracture and L distal radius fracture. He had external fixation on 07/18 and then ORIF on 07/26 with Dr. Liz. He was prescribed clinda 300 mg on 08/28 for a ten day course. Patient was doing well when he acutely became altered and not acting like himself a few hours ago. Patient family member drove him here from [facility] for ortho consult. R medial ankle wound dehisced with redness and swelling around it. No CPM fever, cough, N/V/D or seizure.    In ED: T max 99.1. SIRS 2/4 with WBC 18 and . CMP notable for Na 127, Cr 1.7, . Phos 1.9. .3. BNP 73. Trop neg. A1c 8.5. R tib fib XR notes There are 2 abandoned anterior-posteriorly oriented pin tracks in the right mid tibial shaft.  On AP views, each of these pin tracks demonstrates a small faint internal density, possibly representing a sequestrum. Ortho and endocrine consulted. Given IV vanc and IV clindamycin. Given 2.7L IVFs. Admitted to hospital medicine for sepsis 2/2 infected hardware.     Overview/Hospital Course:  Cortes Schroeder is a 65 y/o male admitted to hospital medicine for sepsis related to right ankle from surgical wound infection in patient with recent ORIF of right ankle fracture done on 7/26/2024. Patient started on empiric IV Vancomycin and IV Cefepime and given IVF's as per sepsis protocol. Orthopedics consulted and patient taken to OR on 9/6/2024 and had irrigation debridement of right surgical incision and  Quality 111:Pneumonia Vaccination Status For Older Adults: Pneumococcal Vaccination not Administered or Previously Received, Reason not Otherwise Specified placement of wound vac.Endocrine consulted to assist in management of his diabetes while in hospital. Blood cultures from admit returned with MSSA. Infectious Disease consulted. Wound culture returned positive for Group B streptococcus and MSSA. Echo done due to bacteremia without concern for vegetations. ID changed antibiotics to IV Oxacillin. Patient with new cough and worsening WBC. CTA chest negative for PE but notes small area of ground glass changes to RUL. Patient placed on 5 day course of po Doxycycline to treat as per ID and completed for possible pneumonia. Patient taken back to OR on 09/09/2024 with Ortho and had another irrigation and debridement and replacement of wound vac to right ankle. Plastics consulted for flap evaluation and recommended vascular evaluation. CTA right lower extremity done and showed moderate atherosclerosis and multifocal high grade stenosis throughout right calf arteries. Dietary consulted for malnutrition and started on Boost supplementation to maximize his nutrition for a flap and wound healing. Vascular surgery consulted per Plastics recs as they would like to pre-optimize blood flow prior to any free flap or pedicled propellar flap. Vascular recommended angiogram of right leg but patient developed DEEPALI. Nephrology consulted and suspected ATN related to contrast nephropathy. Patient placed on supportive care. DEEPALI resolved. Patyoanetn taken for unilateral angiogram by Vascular on 9/17 and underwent PTA of right posterior tibial artery and right anterior tibial artery. After revascularization of right leg pl;an to do flap but working on timing with Ortho and Plastics. Patient to go back to OR again on 9/20 for another irrigation and debridement and replacement of wound vac to right ankle wound by Ortho. Plastics plans propeller flap to right ankle on 9/25 and then will need to remain in hospital for 1 week after flap for dangling protocol and monitoring of flap per Plastics. Repeat  Detail Level: Detailed blood cultures from 9/8 and 9/10 no growth. Patient taken back to OR on 9/20 with Dr. Moe Liz and had another I&D of right ankle and wound and replacement of wound vac. Patient to remain non weight bearing post-op and Plastics plans flap placement while in hospital on 9/25. Patient to go back to OR on for another I&D and wound vac change on 9/23. Patient taken back to OR on 9/23 with Dr. Liz and had another I&D and wound vac change with Orthopedics. Ortho noted anterolateral foot eschar developing. Distal lateral wound breakdown and ortho opened up majority of lateral wound and excised surrounding skin and subcutaneous tissue. Ortho removed all ankle hardware. Wound vacs placed medially and laterally. Patient re-evaluated by Plastics on 9/25 and state due to lateral wound in addition to anterior wound no longer a candidate for propellar flap and plastic will need to do free flap to cover both wounds. Plastics concerned if no adequate vascular targets, will be unable to cover and BKA is only option. Patient not very happy. Patient to go to OR On 9/26 for free flap.     Interval History: he reports feeling okay so far this morning with roommate hugo at bedside as well as anesthesia pain team, with some pain reported and anesthesia to likely change meds around with PNC in place. Received 1 U pre op and 1 intra-op yesterday with hg stable at 10 now. He is hungry for breakfast and just delivered as came out of OR later and didn't get any food last night after OR. Prevena to stump site now and other vac from left flank removed by plastics who reports site looking well on exam. Ortho notes report mayneed short antibotic course post amputation given exposure in OR with 1 stage surgery, ID to see him post op now to discuss antibitoics/ possible ROBERTA if going off before planned 6 weeks previous date.  Previous end date was 10/21 and if ortho wants 2 weeks then the date is extremely close to the initial date  "anwyays, so will f/u with ID further re plans for this. Pt/ot onsulted as likely will need to pursue rehab post BKA and will consult PM and R pending recs. He had some slight cofusion today as was reported to have some in pacu and he said that they told him in pacu that he "kept asking for an [employer] catalogue" and he didn't know why he would do that. I told him I dont think that is a thing, but he seemed to be slightly a bit off baseline just a tiny bit when referencing the episode of confusion in pacu he was told about.    Review of Systems   Constitutional:  Negative for fever.   Respiratory:  Negative for shortness of breath.    Cardiovascular:  Negative for chest pain.   Gastrointestinal:  Negative for nausea and vomiting.   Musculoskeletal:  Positive for arthralgias (Right ankle).   Psychiatric/Behavioral:  Negative for confusion.      Objective:     Vital Signs (Most Recent):  Temp: 97.5 °F (36.4 °C) (09/25/24 1540)  Pulse: 79 (09/25/24 1540)  Resp: 19 (09/25/24 1540)  BP: 158/76 (09/25/24 1540)  SpO2: 96 % (09/25/24 1540) on room air Vital Signs (24h Range):  Temp:  [98 °F (36.7 °C)-99.3 °F (37.4 °C)] 99.3 °F (37.4 °C)  Pulse:  [] 110  Resp:  [15-25] 16  SpO2:  [94 %-98 %] 96 %  BP: (126-203)/(59-85) 150/65     Weight: 93 kg (204 lb 15.7 oz)  Body mass index is 34.11 kg/m².    Intake/Output Summary (Last 24 hours) at 10/2/2024 1028  Last data filed at 10/2/2024 0500  Gross per 24 hour   Intake 1873 ml   Output 600 ml   Net 1273 ml         Physical Exam  Vitals and nursing note reviewed.   Constitutional:       General: He is awake. He is not in acute distress.     Appearance: Normal appearance. He is well-developed. He is obese.      Comments: Patient sitting up in bed and spirits improving   Eyes:      Conjunctiva/sclera: Conjunctivae normal.   Cardiovascular:      Rate and Rhythm: Normal rate and regular rhythm.      Heart sounds: Normal heart sounds. No murmur heard.  Pulmonary:      Effort: Pulmonary " Quality 130: Documentation Of Current Medications In The Medical Record: Current Medications Documented effort is normal. No respiratory distress.      Breath sounds: Normal breath sounds. No wheezing.   Abdominal:      General: Abdomen is flat. Bowel sounds are normal. There is no distension.      Palpations: Abdomen is soft.      Tenderness: There is no abdominal tenderness.      Comments: Bandages to left flank   Musculoskeletal:      Left lower leg: No edema.      Comments: S/p BKA to RLE with bandages throughotu stump site and wound vac in place   Skin:     General: Skin is warm.      Findings: No erythema.   Neurological:      Mental Status: He is alert and oriented to person, place, and time.   Psychiatric:         Mood and Affect: Mood normal.         Behavior: Behavior normal. Behavior is cooperative.         Thought Content: Thought content normal.         Judgment: Judgment normal.             Significant Labs: CBC:   Recent Labs   Lab 10/01/24  0236 10/02/24  0230   WBC 10.36 13.53*   HGB 7.5* 10.2*   HCT 21.8* 31.7*   * 497*     CMP:   Recent Labs   Lab 10/01/24  0236 10/02/24  0230    137   K 4.1 4.0    109   CO2 20* 20*    92   BUN 13 15   CREATININE 0.8 0.9   CALCIUM 7.7* 8.0*   PROT 5.0* 5.6*   ALBUMIN 1.5* 1.7*   BILITOT 0.2 0.5   ALKPHOS 125 134   AST 12 24   ALT 10 7*   ANIONGAP 8 8     Blood Sugars (AccuCheck):    Recent Labs     09/30/24  2327 10/01/24  0734 10/01/24  1132 10/01/24  1840 10/01/24  2038 10/02/24  0758   POCTGLUCOSE 82 171* 191* 85 111* 105       Significant Imaging: I have reviewed all pertinent imaging results/findings within the past 24 hours.    Assessment/Plan:      * Surgical wound breakdown  Closed trimalleolar fracture of right ankle s/p ORIF in 7/2024  Surgical site infection  65 yo male presenting with confusion and worsening redness, swelling and pain to right ankle. Patient with sepsis criteria on admit and right ankle wound felt to be source of infection.   - Ortho consulted and patient taken to OR 9/06/2024 for debridement of surgical wound  with wound vac placed. Cultures taken and grew both MSSA and Group B streptococcus from wound. Patient taken back again to OR with Ortho with Dr. Liz and patient had another I&Dand wound vac change on 9/9/2024. Orthopedics took back to OR again on 9/20/2024 with Dr. Liz and underwent another I&D of right ankle and wound and replacement of wound vac. Patient taken back to OR with OR again on 9/23 with Dr. Liz and had another I&D and wound vac change with Orthopedics. Ortho noted anterolateral foot eschar developing. Distal lateral wound breakdown and ortho opened up majority of lateral wound and excised surrounding skin and subcutaneous tissue. Ortho removed all ankle hardware. Wound vacs placed medially and laterally.   - Orthopedics recommended Plastics evaluation for free flap and as part of evaluation recommended Vascular evaluation. Vascular evaluated patient and angiogram of right leg done and PTA done and revascularization of right PT/AT. Plastics plan propeller flap on 9/25/2024 but unfortunately due to presence of lateral and anterior wounds to right ankle no longer candidate for propeller flap so now will need free flap to cover both wounds. Plastics plans to take to OR on 9/26 for free flap to right ankle wounds. Plastics reports if unable to do free flap then patient's only option will be a BKA.  He understands and will f/u further 9/26 OR recs and was in OR for 8 hours with attemptes at targets at least 3 times but was not successful unfortunately and now will plan for BKA with ortho.   Discussed with ID- Andrew Kearns- would cont 24 hours after surgery oxacillin now, and is checking with staff to see if owuld need to consider a ROBERTA to confirm no endocardits before stopping antibiotics as was covering for 6 weeks for osteo that would cover any presumed endocarditis in case had any infxn that wasn't seen on TTE and will f/u recs. ID- Jurgen pillai to see him after surgery to discuss. He may  decline if rec as he is really not intersted in any more procedures after the BKA he said as he is anxious to move along with hospital stay and ID aware. Ortho recs that they think needs a short course of antibiotics post BKA given op field exposures so will f/u ID thoughts and udpated them on this, as end date initially was 10/21 so if needed short course the end dates are very close in timing either way. Prevena x 1 week post op per ortho.  will likely need IP rehab to recover and will plan for PM and R consult after surgery and repeat PT/OT consult and ordered 10/2  -s/p OR with BKA 10/1 with dr velez  - Wound cultures -- MSSA and Group B strep from right ankle.   - Blood cultures -- MSSA on admit but repeat blood cultures negative.   - ID consulted and following and appreciate recs:  - Continue IV Oxacillin 12 g every 24 hours continuous infusion  - Anticipate 6 weeks of IV antibiotics from date of most recent washout.  - Pain controlled and continue multimodals with anesthesia managing post op with PNC now 10/2   - dvt ppx held for BKA- will f/u with ortho about resuming now post op 10/2    Airway malacia  Noted on CT scan of chest. Patient with no acute issues and incidentally noted on CT scan of chest. Patient asymptomatic.       PAD (peripheral artery disease)  Present on admit. Patient noted to have high grade stenosis on CTA of right leg. Vascular surgery consulted and patient taken to OR and had unilateral angiogram of right leg and had PT/AT artery stenosis on 9/17 and underwent PTA of right PT/AT arteries. Appreciate Vascular surgery assistance on case.   Concern with wound breakdown in last week and if free flap not successful on 9/26 then only other option may be bka and unfortunately waws not successful and will f/u ortho recs as now BKA is plan  -s/p free flap attempt with plastics and did attempt donor flap from abdomen/flank area and so has 2 drains from this area post op in place and vac as  well from donor site even though did not take, and still in place 9/28 and plastics managing and removed by plastics pior to BKA on 10/2 and site healing well per most reent plastics evals      Acute blood loss anemia  1U 10/1 pre op for BKA as hg 7.5 and 1 intra-op and hg now 10 on 10/2  Received 2 U during plastic surgery flap attempt on 9/27 with stable hg post op  - Hgb fluctuating and own to 7.6 on 9/25 from 8.3 on 9/24. No clinical signs of bleeding and will monitor.   - Anemia is likely due to acute blood loss which was from surgery. Most recent hemoglobin and hematocrit are listed below.  Recent Labs     09/30/24  0434 10/01/24  0236 10/02/24  0230   HGB 7.8* 7.5* 10.2*   HCT 24.5* 21.8* 31.7*       Plan  - Monitor serial CBC: Daily  - Transfuse PRBC if patient becomes hemodynamically unstable, symptomatic or H/H drops below 7/21.  - Patient has not received any PRBC transfusions to date  - Patient's anemia is currently stable and monitor. No indication for blood transfusion at this time.     Thrombocytosis  Improving with treatment of infection. Present on admit and related to infection causing increase in platelet count. Monitor daily CBC.       On pre-exposure prophylaxis for HIV  Patient on Truvada as outpatient but held in hospital due to elevated AST and ALT that has resolved. Plan to resume Truvada on discharge.     MSSA bacteremia  Bacteremia resolved.   - Blood cultures from admit grew MSSA. Repeat blood cultures done on 9/8 and 9/10 no growth.  - Infectious Disease consulted and patient placed on IV Oxacillin infusion as suspected source was right ankle surgical wound infection for bacteremia as grew MSSA from right ankle wound.   - Echo done without concern for vegetations.    Uncontrolled type 2 diabetes mellitus with hyperglycemia  Patient's FSGs controlled. Endocrine consulted and managing patient's diabetes in hospital and appreciate assistance. Patient on insulin pump at [facility] but not in  hospital and on basal/prandial insulin in hospital as per Endocrine. Appreciate Endocrine assistance on this case.   Last A1c reviewed-   Lab Results   Component Value Date    LABA1C 10.8 (H) 08/15/2016    HGBA1C 8.5 (H) 09/06/2024     Most recent fingerstick glucose reviewed-   Recent Labs   Lab 09/24/24  1930 09/25/24  0733 09/25/24  1123 09/25/24  1542   POCTGLUCOSE 299* 260* 221* 107       Current correctional scale  Low  Maintain anti-hyperglycemic dose as follows-   Antihyperglycemics (From admission, onward)      Start     Stop Route Frequency Ordered    09/25/24 1000  insulin aspart U-100 pen 0-10 Units         -- SubQ Before meals & nightly PRN 09/25/24 0901    09/25/24 0900  insulin glargine U-100 (Lantus) pen 20 Units         -- SubQ Daily 09/25/24 0844    09/20/24 1130  insulin aspart U-100 pen 2-8 Units         -- SubQ 3 times daily with meals 09/20/24 0844           - Endocrine consulted:  - At this time, recommend that the patient remain off of his pump since he cannot be controlled in the Auto mode. Patient does not interact with pump frequently and relies on the auto mode algorithm.   - Insulin dosing as per Endocrine recommendations.   - Hold oral antihyperglycemics during hospitalization   - Monitor blood sugars with meals and at bedtime in hospital.  - Target blood sugars 140-180 in hospital.  - Diabetic diet.     Closed trimalleolar fracture of right ankle  S/p ORIF 07/2024  Ortho consulted   - see surgical wound breakdown above  NWB  S/p BKA 10/1    Class 1 obesity due to excess calories with serious comorbidity and body mass index (BMI) of 34.0 to 34.9 in adult  Body mass index is 34.12 kg/m². Morbid obesity complicates all aspects of disease management from diagnostic modalities to treatment. Weight loss encouraged and health benefits explained to patient.         Gastroesophageal reflux disease without esophagitis  Controlled. Continue Carafate every 6 hours po to treat.       Metabolic  "acidosis  Bicarb 11 on 92/7 unclear reasons, will check lactic acid and resume na bicarb  Resolved. Discontinue sodium bicarbonate tablets on 9/22.   Related to DEEPALI. Patient started on sodium bicarbonate tablets and will continue. Monitor daily HCO3 levels with labs.   Lactate never drawn but improved to 18 now. Will titrate down na bicarb pending trends 9/28  Bicarb currently 20 on na bicarb so keeping on for now 10/2 and can titrate down clsoer to dc      Uncontrolled type 2 diabetes mellitus with diabetic polyneuropathy, with long-term current use of insulin  Patient's FSGs are uncontrolled due to hyperglycemia on current medication regimen.  Last A1c reviewed-   Lab Results   Component Value Date    LABA1C 10.8 (H) 08/15/2016    HGBA1C 9.9 (H) 07/18/2024     Most recent fingerstick glucose reviewed- No results for input(s): "POCTGLUCOSE" in the last 24 hours.  Current correctional scale  Low  Maintain anti-hyperglycemic dose as follows-   Antihyperglycemics (From admission, onward)      Start     Stop Route Frequency Ordered    09/06/24 0506  insulin aspart U-100 pen 0-5 Units         -- SubQ Every 6 hours PRN 09/06/24 0406          Hold Oral hypoglycemics while patient is in the hospital.    Anxiety  Had panic episodes and pulling at lines when he woke up in panic on 9/28 overnight. Start trazodone for sleep and has ativan prn if anxiety severe. Dc telemetry as has had no ectopy through 3+ weeks of admit and 1 less line to cause distress or panic        VTE Risk Mitigation (From admission, onward)           Ordered     IP VTE HIGH RISK PATIENT  Once         09/06/24 9282                    Discharge Planning   AJYLEEN: 10/3/2024     Code Status: Full Code   Is the patient medically ready for discharge?:     Reason for patient still in hospital (select all that apply): Patient trending condition  Discharge Plan A: Home Health                  Maria Del Rosario Medina MD  Department of Hospital Medicine   [facility] - " Surgery

## 2024-10-02 NOTE — PROGRESS NOTES
"Plastic and Reconstructive Surgery   Progress Note    Subjective:     Seen this AM. No complaints. Back dressing change. Incision stable from yesterday    Objective:  Vital signs in last 24 hours:  Temp:  [98 °F (36.7 °C)-99.3 °F (37.4 °C)] 99.3 °F (37.4 °C)  Pulse:  [] 110  Resp:  [15-25] 17  SpO2:  [94 %-98 %] 96 %  BP: (126-203)/(59-85) 150/65    Intake/Output last 3 shifts:  I/O last 3 completed shifts:  In: 1873 [P.O.:590; Blood:283; IV Piggyback:1000]  Out: 1275 [Urine:1225; Other:50]    Intake/Output this shift:  No intake/output data recorded.        Physical Exam:  VITAL SIGNS:   Vitals:    10/01/24 2345 10/02/24 0315 10/02/24 0354 10/02/24 0759   BP: 126/69  135/64 (!) 150/65   BP Location: Left arm  Left arm    Patient Position: Lying  Lying Lying   Pulse: 98  103 110   Resp: 17  18 17   Temp: 98 °F (36.7 °C)  98.4 °F (36.9 °C) 99.3 °F (37.4 °C)   TempSrc: Oral  Oral Oral   SpO2: 95%  (!) 94% 96%   Weight:  93 kg (204 lb 15.7 oz)     Height:  5' 5" (1.651 m)       TMAX: Temp (24hrs), Av.5 °F (36.9 °C), Min:98 °F (36.7 °C), Max:99.3 °F (37.4 °C)      General: Alert; No acute distress  Cardiovascular: Regular rate   Respiratory: Normal respiratory effort. Chest rise symmetric.   Abdomen: Soft, nontender, nondistended  Extremity:  RLE vac in place , able to wiggle toes, sensation intact, splint in place, Wound vac - serosang  Back incision clean dry and intact. Dressing changed this morning  Neurologic: No focal deficit. Speech normal      Scheduled Medications acetaminophen, 1,000 mg, Q8H  amLODIPine, 10 mg, Daily  hydrALAZINE, 50 mg, Q8H  insulin aspart U-100, 3-8 Units, TIDWM  insulin glargine U-100, 20 Units, Daily  losartan, 50 mg, Daily  nortriptyline, 50 mg, Nightly  oxacillin 12 g in  mL CONTINUOUS INFUSION, 12 g, Q24H  pantoprazole, 40 mg, Daily  polyethylene glycol, 17 g, BID  senna, 8.6 mg, BID  sodium bicarbonate, 650 mg, TID  sucralfate, 1 g, Q6H  traZODone, 50 mg, QHS  vitamin " D, 1,000 Units, Daily        PRN Medications     Current Facility-Administered Medications:     0.9%  NaCl infusion (for blood administration), , Intravenous, Q24H PRN    0.9%  NaCl infusion (for blood administration), , Intravenous, Q24H PRN    0.9%  NaCl infusion (for blood administration), , Intravenous, Q24H PRN    0.9%  NaCl infusion (for blood administration), , Intravenous, Q24H PRN    albuterol-ipratropium, 3 mL, Nebulization, Q4H PRN    calcium carbonate, 1,000 mg, Oral, TID PRN    dextrose 10%, 12.5 g, Intravenous, PRN    dextrose 10%, 12.5 g, Intravenous, PRN    dextrose 10%, 12.5 g, Intravenous, PRN    dextrose 10%, 12.5 g, Intravenous, PRN    dextrose 10%, 25 g, Intravenous, PRN    dextrose 10%, 25 g, Intravenous, PRN    dextrose 10%, 25 g, Intravenous, PRN    dextrose 10%, 25 g, Intravenous, PRN    glucagon (human recombinant), 1 mg, Intramuscular, PRN    glucagon (human recombinant), 1 mg, Intramuscular, PRN    glucose, 16 g, Oral, PRN    glucose, 24 g, Oral, PRN    hydrALAZINE, 50 mg, Oral, Q8H PRN    insulin aspart U-100, 0-10 Units, Subcutaneous, QID (AC + HS) PRN    LORazepam, 0.5 mg, Oral, Q6H PRN    methocarbamoL, 500 mg, Oral, Q8H PRN    morphine, 2 mg, Intravenous, Q6H PRN    morphine, 4 mg, Intravenous, Q6H PRN    ondansetron, 8 mg, Oral, Q8H PRN    prochlorperazine, 5 mg, Intravenous, Q30 Min PRN    Recent Labs:   Lab Results   Component Value Date    WBC 13.53 (H) 10/02/2024    HGB 10.2 (L) 10/02/2024    HCT 31.7 (L) 10/02/2024    MCV 88 10/02/2024     (H) 10/02/2024     Lab Results   Component Value Date    GLU 92 10/02/2024     10/02/2024    K 4.0 10/02/2024     10/02/2024    BUN 15 10/02/2024         Assessment:   64 y.o. y/o male s/p Procedure(s):  REPLACEMENT, WOUND VAC; white & black sponge  REMOVAL, HARDWARE, ANKLE      1 Day Post-Op     64 y.o.male W/ right ankle post-surgical medial wound dehiscence and exposed hardware.      Now s/p PTA RLE PT/AT, completion  angiogram showing 3 vessel run off, also washout and vac exchange    S/p Attempted Parascapular flap- however calcified PT and poor inflow    S/p right BKA     Plan  - Reg diet  - Abx: per ID  - DVT ppx, IS, PT/OT  - Plan for BKA today with ortho  - Incision on back without tonya signs of ischemia, no breakdown. Will continue to monitor for now.       Pt was d/w attending surgeon, Dr. Rom Adam MD   Plastic Surgery Fellow  10/2/2024

## 2024-10-02 NOTE — ASSESSMENT & PLAN NOTE
Bicarb 11 on 92/7 unclear reasons, will check lactic acid and resume na bicarb  Resolved. Discontinue sodium bicarbonate tablets on 9/22.   Related to DEEPALI. Patient started on sodium bicarbonate tablets and will continue. Monitor daily HCO3 levels with labs.   Lactate never drawn but improved to 18 now. Will titrate down na bicarb pending trends 9/28  Bicarb currently 20 on na bicarb so keeping on for now 10/2 and can titrate down clsoer to dc

## 2024-10-02 NOTE — ANESTHESIA POST-OP PAIN MANAGEMENT
Acute Pain Service Progress Note    Cortes Schroeder is a 64 y.o., male, [id number].    Surgery:  Right BKA    Post Op Day #: 1    Catheter type: perineural  popliteal    Infusion type: Ropivacaine 0.2%  10cc/ Q3H IB with 5cc/Q30min DB    Problem List:    Active Hospital Problems    Diagnosis  POA    *Surgical wound breakdown [T81.31XA]  Yes    Airway malacia [J39.8]  Yes    Acute blood loss anemia [D62]  No    PAD (peripheral artery disease) [I73.9]  Yes    Thrombocytosis [D75.839]  Yes    MSSA bacteremia [R78.81, B95.61]  Yes    On pre-exposure prophylaxis for HIV [Z79.899]  Not Applicable    Surgical site infection [T81.49XA]  Yes    Closed trimalleolar fracture of right ankle [S82.851A]  Yes    Uncontrolled type 2 diabetes mellitus with hyperglycemia [E11.65]  Yes    Class 1 obesity due to excess calories with serious comorbidity and body mass index (BMI) of 34.0 to 34.9 in adult [E66.811, E66.09, Z68.34]  Not Applicable    Gastroesophageal reflux disease without esophagitis [K21.9]  Yes    Metabolic acidosis [E87.20]  Yes    Hyperlipidemia [E78.5]  Yes    Anxiety [F41.9]  Yes      Resolved Hospital Problems    Diagnosis Date Resolved POA    Hyperphosphatemia [E83.39] 09/19/2024 No    Bacteriuria [R82.71] 09/18/2024 No    Constipation [K59.00] 09/19/2024 No    Oropharyngeal dysphagia [R13.12] 09/20/2024 No    Sinus tachycardia [R00.0] 09/19/2024 Yes    Leukocytosis [D72.829] 09/18/2024 Yes    Acute metabolic encephalopathy [G93.41] 09/18/2024 No    Acute retention of urine [R33.8] 09/19/2024 No    Cough [R05.9] 09/18/2024 Yes    Transaminitis [R74.01] 09/21/2024 Yes    Sepsis [A41.9] 09/18/2024 Yes    Hyponatremia [E87.1] 09/21/2024 Yes    Hypertension, essential [I10] 09/24/2024 Yes    Hypokalemia [E87.6] 09/23/2024 Yes    Acute renal failure with tubular necrosis [N17.0] 09/21/2024 Yes    Acute hypoxemic respiratory failure [J96.01] 09/21/2024 Yes       Subjective:     General appearance of alert, oriented, no  complaints   Pain with rest: 8    Numbers   Pain with movement: 9    Numbers   Side Effects    1. Pruritis No    2. Nausea No    3. Motor Blockade No, 0=Ability to raise lower extremities off bed    4. Sedation No, 1=awake and alert    Objective:     Catheter site clean, dry, intact      Vitals   Vitals:    10/02/24 0856   BP:    Pulse:    Resp: 16   Temp:         Labs    No results displayed because visit has over 200 results.           Meds   Current Facility-Administered Medications   Medication Dose Route Frequency Provider Last Rate Last Admin    0.9%  NaCl infusion (for blood administration)   Intravenous Q24H PRN Destin Enamorado MD        0.9%  NaCl infusion (for blood administration)   Intravenous Q24H PRN Destin Enamorado MD        0.9%  NaCl infusion (for blood administration)   Intravenous Q24H PRN Artur Galvan MD        0.9%  NaCl infusion (for blood administration)   Intravenous Q24H PRN Artur Galvan MD        acetaminophen tablet 1,000 mg  1,000 mg Oral Q6H Devon Garcia DO        albuterol-ipratropium 2.5 mg-0.5 mg/3 mL nebulizer solution 3 mL  3 mL Nebulization Q4H PRN Destin Enamorado MD   3 mL at 09/12/24 1621    amLODIPine tablet 10 mg  10 mg Oral Daily Destin Enamorado MD   10 mg at 10/01/24 0920    calcium carbonate 200 mg calcium (500 mg) chewable tablet 1,000 mg  1,000 mg Oral TID PRN Destin Enamorado MD   1,000 mg at 09/28/24 0840    celecoxib capsule 200 mg  200 mg Oral Daily Devon Garcia DO        dextrose 10% bolus 125 mL 125 mL  12.5 g Intravenous PRN Destin Enamorado MD        dextrose 10% bolus 125 mL 125 mL  12.5 g Intravenous PRN Destin Enamorado MD        dextrose 10% bolus 125 mL 125 mL  12.5 g Intravenous PRN Destin Enamorado MD        dextrose 10% bolus 125 mL 125 mL  12.5 g Intravenous PRN Bernarda Caro MD        dextrose 10% bolus 250 mL 250 mL  25 g Intravenous PRN Destin Enamorado MD   Stopped at 09/12/24 2204    dextrose 10% bolus 250 mL  250 mL  25 g Intravenous PRN Destin Enamorado MD        dextrose 10% bolus 250 mL 250 mL  25 g Intravenous PRN Destin Enamorado MD        dextrose 10% bolus 250 mL 250 mL  25 g Intravenous PRN Bernarda Caro MD        glucagon (human recombinant) injection 1 mg  1 mg Intramuscular PRN Destin Enamorado MD        glucagon (human recombinant) injection 1 mg  1 mg Intramuscular PRN Destin Enamorado MD        glucose chewable tablet 16 g  16 g Oral PRN Destin Enamorado MD   16 g at 09/12/24 0730    glucose chewable tablet 24 g  24 g Oral PRN Destin Enamorado MD        hydrALAZINE tablet 50 mg  50 mg Oral Q8H PRN Maria Del Rosario Medina MD   50 mg at 09/29/24 0834    hydrALAZINE tablet 50 mg  50 mg Oral Q8H Maria Del Rosario Medina MD   50 mg at 10/02/24 0520    insulin aspart U-100 pen 0-10 Units  0-10 Units Subcutaneous QID (AC + HS) PRN Payton Vora PA-C   4 Units at 09/30/24 1153    insulin aspart U-100 pen 3-8 Units  3-8 Units Subcutaneous TIDWM Brandyn Malin, KAREEM, FNP   6 Units at 10/02/24 0839    insulin glargine U-100 (Lantus) pen 20 Units  20 Units Subcutaneous Daily Payton Vora PA-C   20 Units at 10/02/24 0859    LORazepam tablet 0.5 mg  0.5 mg Oral Q6H PRN Maria Del Rosario Medina MD   0.5 mg at 10/01/24 1838    losartan tablet 50 mg  50 mg Oral Daily Destin Enamorado MD   50 mg at 10/01/24 0921    methocarbamoL tablet 500 mg  500 mg Oral Q6H Devon Garcia,         nortriptyline capsule 50 mg  50 mg Oral Nightly Destin Enamorado MD   50 mg at 10/01/24 2100    ondansetron disintegrating tablet 8 mg  8 mg Oral Q8H PRN Destin Enamorado MD   8 mg at 09/29/24 0840    oxacillin 12 g in  mL CONTINUOUS INFUSION  12 g Intravenous Q24H Destin Enamorado MD 20.8 mL/hr at 10/01/24 1213 12 g at 10/01/24 1213    pantoprazole EC tablet 40 mg  40 mg Oral Daily Destin Enamorado MD   40 mg at 10/01/24 0921    polyethylene glycol packet 17 g  17 g Oral BID Destin Enamorado MD   17 g at  09/23/24 2134    pregabalin capsule 50 mg  50 mg Oral BID Devon Garcia DO        prochlorperazine injection Soln 5 mg  5 mg Intravenous Q30 Min PRN Destin Enamorado MD        ropivacaine 0.2% Perineural Pump infusion 500 ML   Perineural Continuous Chiquita Pandey DO   New Bag at 10/01/24 1835    senna tablet 8.6 mg  8.6 mg Oral BID Destin Enamorado MD   8.6 mg at 09/22/24 2034    sodium bicarbonate tablet 650 mg  650 mg Oral TID Maria Del Rosario Medina MD   650 mg at 10/01/24 2100    sucralfate 100 mg/mL suspension 1 g  1 g Oral Q6H Destin Enamorado MD   1 g at 09/30/24 2350    traZODone tablet 50 mg  50 mg Oral QHS Maria Del Rosario Medina MD   50 mg at 10/01/24 2100    vitamin D 1000 units tablet 1,000 Units  1,000 Units Oral Daily Destin Enamorado MD   1,000 Units at 10/01/24 0920       Assessment:    Cortes Schroeder is a 63 YO male with a PMH significant for T2DM, HLD, HTN that is now s/p right BKA after a failed right ankle ORIF in July. Patient is a very pleasant person and did not appear to be in any acute distress. He reports pain primarily localized to his right leg. Plan to continue PNC and modify the existing multimodal pain regimen for better control of his pain.      Pain control adequate    Plan:    Tylenol 1000mg Q6H x 8 doses  Celebrex 200mg QD  Robaxin 500mg Q6H x 8 doses  Neurotin 300mg BID  Nortriptyline 50mg QHS  Trazadone 50mg QHS  Ativan 0.5mg Q6H PRN  IV Morphine 2/4mg Q6H PRN - discontinued       Modify present therapy to improve control of pain as above    Thank you for involving us in his care. We will continue to follow. Please reach out to the Acute Pain Service with any additional questions or concerns.    Mary Davey MD  Acute Pain Service, PGY-1  [facility]  10/02/2024.9:37 AM

## 2024-10-02 NOTE — ASSESSMENT & PLAN NOTE
Closed trimalleolar fracture of right ankle s/p ORIF in 7/2024  Surgical site infection  65 yo male presenting with confusion and worsening redness, swelling and pain to right ankle. Patient with sepsis criteria on admit and right ankle wound felt to be source of infection.   - Ortho consulted and patient taken to OR 9/06/2024 for debridement of surgical wound with wound vac placed. Cultures taken and grew both MSSA and Group B streptococcus from wound. Patient taken back again to OR with Ortho with Dr. Liz and patient had another I&Dand wound vac change on 9/9/2024. Orthopedics took back to OR again on 9/20/2024 with Dr. Liz and underwent another I&D of right ankle and wound and replacement of wound vac. Patient taken back to OR with OR again on 9/23 with Dr. Liz and had another I&D and wound vac change with Orthopedics. Ortho noted anterolateral foot eschar developing. Distal lateral wound breakdown and ortho opened up majority of lateral wound and excised surrounding skin and subcutaneous tissue. Ortho removed all ankle hardware. Wound vacs placed medially and laterally.   - Orthopedics recommended Plastics evaluation for free flap and as part of evaluation recommended Vascular evaluation. Vascular evaluated patient and angiogram of right leg done and PTA done and revascularization of right PT/AT. Plastics plan propeller flap on 9/25/2024 but unfortunately due to presence of lateral and anterior wounds to right ankle no longer candidate for propeller flap so now will need free flap to cover both wounds. Plastics plans to take to OR on 9/26 for free flap to right ankle wounds. Plastics reports if unable to do free flap then patient's only option will be a BKA.  He understands and will f/u further 9/26 OR recs and was in OR for 8 hours with attemptes at targets at least 3 times but was not successful unfortunately and now will plan for BKA with ortho.   Discussed with ID- Andrew Kearns- would cont 24  hours after surgery oxacillin now, and is checking with staff to see if owuld need to consider a ROBERTA to confirm no endocardits before stopping antibiotics as was covering for 6 weeks for osteo that would cover any presumed endocarditis in case had any infxn that wasn't seen on TTE and will f/u recs. ID- Jurgen pillai to see him after surgery to discuss. He may decline if rec as he is really not intersted in any more procedures after the BKA he said as he is anxious to move along with hospital stay and ID aware. Ortho recs that they think needs a short course of antibiotics post BKA given op field exposures so will f/u ID thoughts and udpated them on this, as end date initially was 10/21 so if needed short course the end dates are very close in timing either way. Prevena x 1 week post op per ortho.  will likely need IP rehab to recover and will plan for PM and R consult after surgery and repeat PT/OT consult and ordered 10/2  -s/p OR with BKA 10/1 with dr velez  - Wound cultures -- MSSA and Group B strep from right ankle.   - Blood cultures -- MSSA on admit but repeat blood cultures negative.   - ID consulted and following and appreciate recs:  - Continue IV Oxacillin 12 g every 24 hours continuous infusion  - Anticipate 6 weeks of IV antibiotics from date of most recent washout.  - Pain controlled and continue multimodals with anesthesia managing post op with PNC now 10/2   - dvt ppx held for BKA- will f/u with ortho about resuming now post op 10/2

## 2024-10-02 NOTE — CONSULTS
Inpatient consult to Physical Medicine Rehab  Consult performed by: Aubrie Hurt NP  Consult ordered by: Maria Del Rosario Medina MD  Reason for consult: Rehab      Consult received.     ISELA Hollingsworth, FNP-C  Physical Medicine & Rehabilitation   10/02/2024

## 2024-10-02 NOTE — PT/OT/SLP RE-EVAL
"Physical Therapy Re-evaluation    Patient Name:  Cortes Schroeder   MRN:  [id number]    Recommendations:     Discharge Recommendations: High Intensity Therapy  Discharge Equipment Recommendations: bedside commode   Barriers to discharge: Decreased caregiver support  Per pt, his roommate has a LE amputation  Assessment:     Cortes Schroeder is a 64 y.o. male admitted with a medical diagnosis of Surgical wound breakdown.  He presents with the following impairments/functional limitations: weakness, impaired functional mobility, gait instability, impaired endurance, impaired balance, impaired self care skills, decreased lower extremity function, impaired skin, pain, orthopedic precautions . Pt is unsafe with functional mobility at this time due to pt requires minimal assist for bed mobility, moderate assist for transfers, and moderate assist +1 to follow with bedside chair for gait due to weakness and instability.. Pt is very motivated to progress with functional mobility and asked questions concerning mobility with his R BKA.     Rehab Prognosis:  good; patient would benefit from acute skilled PT services to address these deficits and reach maximum level of function.      Recent Surgery: Procedure(s) (LRB):  AMPUTATION, BELOW KNEE - RIGHT (Right) 1 Day Post-Op    Plan:     During this hospitalization, patient to be seen 4 x/week to address the above listed problems via gait training, therapeutic activities, therapeutic exercises, neuromuscular re-education  Plan of Care Expires:  11/01/24  Plan of Care Reviewed with: patient    Subjective     Communicated with nurse prior to session.  Patient found HOB elevated with peripheral IV, telemetry, wound vac, perineural catheter upon PT entry to room, agreeable to evaluation.    "My roommate has an amputation and prosthesis"  Pain/Comfort:  Pain Rating 1: 8/10  Location - Side 1: Right  Location - Orientation 1: distal  Location 1: leg (residual limb)  Pain Addressed 1: " Reposition, Cessation of Activity  Pain Rating Post-Intervention 1: 8/10    Patients cultural, spiritual, Jehovah's witness conflicts given the current situation: no      Objective:     Patient found with: peripheral IV, telemetry, wound vac, perineural catheter     General Precautions: Standard, fall  Orthopedic Precautions: RLE non weight bearing  Braces: N/A  Respiratory Status: Room air    Exams:  Cognitive Exam:  Patient is oriented to Person and Place  Sensation:    -       Intact  light/touch L LE   RLE ROM: WFL except limited knee flex due to edema and pain  RLE Strength: Deficits: hip flex 3+/5; knee flex.2+/5; knee ext 3-/5  LLE ROM: WFL  LLE Strength: WFL except hip flex 4-/5    Functional Mobility:  Bed Mobility:     Supine to Sit: minimum assistance  Sit to Supine: contact guard assistance  Transfers:     Sit to Stand:  moderate assistance with rolling walker  Gait: 5ft then 4ft with RW with moderate assist +1 to follow with bedside chair for safety.pt performed gait with decreased step length, step to gait, decreased clearance of L foot during swing phase, and required verbal cues to stay closer into the walker and for upright posture. Pt rested in sitting between gait trials. Pt limited with gait distance due to fatigue/weakness    AM-PAC 6 CLICK MOBILITY  Total Score:13     Treatment and Education:   Pt educated in and performed R LE exs in supine x 10 reps: QS and GS. Pt expressed and demonstrated understanding.    Patient left HOB elevated with all lines intact, call button in reach, and nurse notified.    GOALS:   Multidisciplinary Problems       Physical Therapy Goals          Problem: Physical Therapy    Goal Priority Disciplines Outcome Interventions   Physical Therapy Goal     PT, PT/OT Progressing    Description: Goals to be met by: 10/18/24     Patient will increase functional independence with mobility by performin. Supine to sit with [place]  2. Sit to stand transfer with Contact  Guard Assistance  3. Bed to chair transfer with Contact Guard Assistance using Rolling Walker  4. Gait  x 10 feet with Contact Guard Assistance using Rolling Walker.   5. Stand for 5 minutes with Stand-by Assistance using Rolling Walker  6. Pt to propel w/c 100ft on level surface with B UE with supervision-not met    Patient has a mobility limitation that significantly impairs their ability to participate in one or more mobility related activities of daily living, including toileting. This deficit can be resolved by using a bedside commode. Patient demonstrates mobility limitations that will cause them to be confined to one room at home without bathroom access for up to 30 days. Using a bedside commode will greatly improve the patient's ability to participate in MRADLs.                           History:     Past Medical History:   Diagnosis Date    Anxiety 12/18/2012    Back pain 12/18/2012    Cataract     Chronic pain syndrome 04/24/2016    Diabetes type 2, controlled 02/20/2016    Diabetic retinopathy     DM (diabetes mellitus) 12/18/2012    DM (diabetes mellitus), type 2, uncontrolled 11/16/2013    Gastroesophageal reflux disease without esophagitis 02/20/2016    Hyperlipidemia 12/18/2012    Insomnia 08/07/2014    Neuropathy 11/16/2013       Past Surgical History:   Procedure Laterality Date    ANGIOGRAPHY OF LOWER EXTREMITY Right 9/17/2024    Procedure: Angiogram Extremity Unilateral;  Surgeon: GINA Morton II, MD;  Location: [facility] OR [address] [place] [address];  Service: Vascular;  Laterality: Right;  Fluro time 19.5 min  mGy 260.49    ANKLE HARDWARE REMOVAL Right 9/23/2024    Procedure: REMOVAL, HARDWARE, ANKLE;  Surgeon: Moe Liz MD;  Location: [facility] OR [address] [place] [address];  Service: Orthopedics;  Laterality: Right;    APPLICATION OF WOUND VACUUM-ASSISTED CLOSURE DEVICE Right 9/6/2024    Procedure: APPLICATION, WOUND VAC;  Surgeon: Moe Liz MD;  Location: [facility] OR [address] [place] [address];  Service: Orthopedics;  Laterality: Right;     APPLICATION, EXTERNAL FIXATION DEVICE, FOR ANKLE FRACTURE Right 7/18/2024    Procedure: APPLICATION, EXTERNAL FIXATION DEVICE, FOR ANKLE FRACTURE;  Surgeon: Moe Liz MD;  [facility];  Service: Orthopedics;  Laterality: Right;    ARTHROTOMY OF ANKLE Right 9/9/2024    Procedure: ARTHROTOMY, ANKLE;  Surgeon: Moe Liz MD;  Location: [facility] OR [address] [place] [address];  Service: Orthopedics;  Laterality: Right;    BACK SURGERY  2003    Lumbar Spine    BELOW KNEE AMPUTATION OF LOWER EXTREMITY Right 10/1/2024    Procedure: AMPUTATION, BELOW KNEE - RIGHT;  Surgeon: Moe Liz MD;  Location: [facility] OR [address] [place] [address];  Service: Orthopedics;  Laterality: Right;  with wound vac placement    CATARACT EXTRACTION      CLOSED REDUCTION, FRACTURE, ANKLE, TRIMALLEOLAR Right 7/18/2024    Procedure: CLOSED REDUCTION, FRACTURE, ANKLE, TRIMALLEOLAR;  Surgeon: Moe Liz MD;  Location: [facility] OR [address] [place] [address];  Service: Orthopedics;  Laterality: Right;    EPIDURAL STEROID INJECTION N/A 1/16/2019    Procedure: Injection, Steroid, Epidural Cervical;  Surgeon: Andrew Sinclair Jr., MD;  Location: [facility] ENDO;  Service: Pain Management;  Laterality: N/A;  Cervical Epidural Steroid Injection C7-T1    56674    Arrive @ 1240    EPIDURAL STEROID INJECTION N/A 2/20/2019    Procedure: Injection, Steroid, Epidural;  Surgeon: Andrew Sinclair Jr., MD;  Location: [facility] ENDO;  Service: Pain Management;  Laterality: N/A;  Lumbar Epidural Steroid Injection L4-5    [id number]    Arrive @ 1215 (requests latest time)    FIXATION OF SYNDESMOSIS OF ANKLE Right 7/26/2024    Procedure: FIXATION, SYNDESMOSIS, ANKLE;  Surgeon: Moe Liz MD;  [facility];  Service: Orthopedics;  Laterality: Right;    FLAP PROCEDURE Right 9/26/2024    Procedure: CREATION, FREE FLAP;  Surgeon: Juanito Cueto DO;  Location: [facility] OR [address] [place] [address];  Service: Plastics;  Laterality: Right;  Spy; Def Free Flap; Micro instr., Microscope; 2 bovies, 2  teams    INJECTION, SPINE, LUMBOSACRAL, TRANSFORAMINAL APPROACH Bilateral 6/14/2024    Procedure: Bilateral L5 Transforaminal Epidural Steroid Injections;  Surgeon: Andrew Sinclair Jr., MD;  Location: [facility] PAIN MANAGEMENT;  Service: Pain Management;  Laterality: Bilateral;  @1300(given)  ASA last 6/8  Check BG  MD Sign.    IRRIGATION AND DEBRIDEMENT Right 9/6/2024    Procedure: IRRIGATION AND DEBRIDEMENT;  Surgeon: Moe Liz MD;  Location: [facility] OR [facility] FLR;  Service: Orthopedics;  Laterality: Right;    IRRIGATION AND DEBRIDEMENT Right 9/20/2024    Procedure: IRRIGATION AND DEBRIDEMENT;  Surgeon: Moe Liz MD;  Location: [facility] OR [facility] FLR;  Service: Orthopedics;  Laterality: Right;    IRRIGATION AND DEBRIDEMENT OF LOWER EXTREMITY Right 9/9/2024    Procedure: IRRIGATION AND DEBRIDEMENT, LOWER EXTREMITY - WITH WOUND VAC CHANGE, RIGHT ANKLE;  Surgeon: Moe Liz MD;  [facility];  Service: Orthopedics;  Laterality: Right;  WITH WOUND VAC CHANGE, RIGHT ANKLE    OPEN REDUCTION AND INTERNAL FIXATION (ORIF) OF FRACTURE OF DISTAL RADIUS Left 7/19/2024    Procedure: ORIF, FRACTURE, RADIUS, DISTAL - LEFT;  Surgeon: Moe Liz MD;  [facility];  Service: Orthopedics;  Laterality: Left;  LEFT, SYNTHES, C ARM    OPEN REDUCTION AND INTERNAL FIXATION (ORIF) OF INJURY OF ANKLE Right 7/26/2024    Procedure: ORIF, ANKLE;  Surgeon: Moe Liz MD;  [facility];  Service: Orthopedics;  Laterality: Right;    REMOVAL OF EXTERNAL FIXATION DEVICE Right 7/26/2024    Procedure: REMOVAL, EXTERNAL FIXATION DEVICE;  Surgeon: Moe Liz MD;  Location: [facility] OR [facility] FLR;  Service: Orthopedics;  Laterality: Right;    REPLACEMENT OF WOUND VACUUM-ASSISTED CLOSURE DEVICE Right 9/17/2024    Procedure: REPLACEMENT, WOUND VAC;  Surgeon: Moe Liz MD;  Location: [facility] OR 2ND FLR;  Service: Orthopedics;  Laterality: Right;  wound vac dressing exchange     REPLACEMENT OF WOUND VACUUM-ASSISTED CLOSURE DEVICE Right 9/20/2024    Procedure: REPLACEMENT, WOUND VAC;  Surgeon: Moe Liz MD;  Location: [facility] OR [address] [place] [address];  Service: Orthopedics;  Laterality: Right;    REPLACEMENT OF WOUND VACUUM-ASSISTED CLOSURE DEVICE Right 9/23/2024    Procedure: REPLACEMENT, WOUND VAC; white & black sponge;  Surgeon: Moe Liz MD;  Location: [facility] OR [address] [place] [address];  Service: Orthopedics;  Laterality: Right;       Time Tracking:     PT Received On: 10/02/24  PT Start Time: 1400     PT Stop Time: 1427  PT Total Time (min): 27 min     Billable Minutes: Re-eval 17 and Gait Training 10      10/02/2024

## 2024-10-02 NOTE — PLAN OF CARE
Discussed with dr mena- start dvt ppx now, prefer eliquis. Given had significant cad requring bka will nteed antiplatelets also. Will add asa 81 for tomorrow to start to stagger, to ensure hg stable. If hg is labile then would asa start tomorrow and defer until fri

## 2024-10-02 NOTE — ASSESSMENT & PLAN NOTE
Present on admit. Patient noted to have high grade stenosis on CTA of right leg. Vascular surgery consulted and patient taken to OR and had unilateral angiogram of right leg and had PT/AT artery stenosis on 9/17 and underwent PTA of right PT/AT arteries. Appreciate Vascular surgery assistance on case.   Concern with wound breakdown in last week and if free flap not successful on 9/26 then only other option may be bka and unfortunately waws not successful and will f/u ortho recs as now BKA is plan  -s/p free flap attempt with plastics and did attempt donor flap from abdomen/flank area and so has 2 drains from this area post op in place and vac as well from donor site even though did not take, and still in place 9/28 and plastics managing and removed by plastics pior to BKA on 10/2 and site healing well per most reent plastics evals

## 2024-10-02 NOTE — NURSING
Patient Blood glucose 49.  Patient is awake, alert, and oriented.  Crackers and milk provided as well as patients lunch tray brought to room.  Repeat blood glucose performed.  Glucose level 85.  Patient sitting in chair no acute distress noted. FRANCHESKA Olvera notified of this occurrence.   Marsha Patel RN Instructor.

## 2024-10-02 NOTE — ANESTHESIA POSTPROCEDURE EVALUATION
Anesthesia Post Evaluation    Patient: Cortes Schroeder    Procedure(s) Performed: Procedure(s) (LRB):  AMPUTATION, BELOW KNEE - RIGHT (Right)    Final Anesthesia Type: general      Patient location during evaluation: PACU  Patient participation: Yes- Able to Participate  Level of consciousness: awake and alert  Post-procedure vital signs: reviewed and stable  Pain management: adequate  Airway patency: patent  NARINDER mitigation strategies: Extubation while patient is awake, Multimodal analgesia and Use of major conduction anesthesia (spinal/epidural) or peripheral nerve block  PONV status at discharge: No PONV  Anesthetic complications: no      Cardiovascular status: stable  Respiratory status: unassisted and spontaneous ventilation  Hydration status: euvolemic  Follow-up not needed.              Vitals Value Taken Time   /64 10/02/24 0354   Temp 36.9 °C (98.4 °F) 10/02/24 0354   Pulse 103 10/02/24 0354   Resp 18 10/02/24 0354   SpO2 94 % 10/02/24 0354         Event Time   Out of Recovery 10/01/2024 19:00:00         Pain/Jduy Score: Pain Rating Prior to Med Admin: 0 (10/2/2024  5:19 AM)  Pain Rating Post Med Admin: 1 (10/2/2024  6:19 AM)  Judy Score: 9 (10/1/2024  7:00 PM)

## 2024-10-02 NOTE — PLAN OF CARE
Problem: Physical Therapy  Goal: Physical Therapy Goal  Description: Goals to be met by: 10/18/24     Patient will increase functional independence with mobility by performin. Supine to sit with [place]  2. Sit to stand transfer with Contact Guard Assistance  3. Bed to chair transfer with Contact Guard Assistance using Rolling Walker  4. Gait  x 10 feet with Contact Guard Assistance using Rolling Walker.   5. Stand for 5 minutes with Stand-by Assistance using Rolling Walker  6. Pt to propel w/c 100ft on level surface with B UE with supervision-not met    Patient has a mobility limitation that significantly impairs their ability to participate in one or more mobility related activities of daily living, including toileting. This deficit can be resolved by using a bedside commode. Patient demonstrates mobility limitations that will cause them to be confined to one room at home without bathroom access for up to 30 days. Using a bedside commode will greatly improve the patient's ability to participate in MRADLs.      Outcome: Progressing   Re-eval completed, pt's goals remain appropriate and pt will continue to benefit from skilled PT services to work towards improved functional mobility including: bed mobility, transfers, w/c mobility, and gait. Meghana Syed PT  10/2/2024

## 2024-10-02 NOTE — PT/OT/SLP RE-EVAL
Occupational Therapy   Re-evaluation    Name: Cortes Schroeder  MRN: [id number]  Admitting Diagnosis:  Surgical wound breakdown  Recent Surgery: Procedure(s) (LRB):  AMPUTATION, BELOW KNEE - RIGHT (Right) 1 Day Post-Op    Recommendations:     Discharge Recommendations: High Intensity Therapy  Discharge Equipment Recommendations: bedside commode  Barriers to discharge:  Decreased caregiver support    Assessment:     Cortes Schroeder is a 64 y.o. male with a medical diagnosis of Surgical wound breakdown.  He presents with R BKA.  Performance deficits affecting function are weakness, impaired self care skills, gait instability, impaired endurance, impaired balance, decreased lower extremity function, pain, orthopedic precautions, impaired skin.  Pt with better participation in therapy and improved bed mobility and transfers for ADL tasks.    Rehab Prognosis:  Good; patient would benefit from acute skilled OT services to address these deficits and reach maximum level of function.       Plan:     Patient to be seen 4 x/week to address the above listed problems via self-care/home management, therapeutic activities, therapeutic exercises  Plan of Care Expires: 10/18/24  Plan of Care Reviewed with: patient    Subjective     Chief Complaint: None  Patient/Family stated goals: return home  Communicated with: keegan prior to session.  Pain/Comfort:  Pain Rating 1: 0/10  Pain Addressed 1: Reposition, Cessation of Activity, Pre-medicate for activity    Objective:     Communicated with: Keegan prior to session.  Patient found supine with: peripheral IV, telemetry, wound vac, perineural catheter upon OT entry to room.    General Precautions: Standard, fall  Orthopedic Precautions: RLE non weight bearing  Braces: N/A  Respiratory Status: Room air    Occupational Performance:    Bed Mobility:    Patient completed Supine to Sit with contact guard assistance    Functional Mobility/Transfers:  Patient completed Sit <> Stand Transfer with  contact guard assistance and minimum assistance  with  rolling walker   Patient completed Bed <> Chair Transfer using Step Transfer technique with contact guard assistance with rolling walker    Activities of Daily Living:  Upper Body Dressing: minimum assistance don gown    Cognitive/Visual Perceptual:  A&O x4    Physical Exam:  BUE WNL  Balance: Fair+    AMPAC 6 Click:  AMPAC Total Score: 19    Treatment & Education:  Pt educated on role and purpose of therapy  Pt educated on goal setting  Pt educated on benefits of OOB activity  Pt educated on self advocacy   Pt educated on NWB precautions     Patient left up in chair with all lines intact, call button in reach, and nsg notified    GOALS:   Multidisciplinary Problems       Occupational Therapy Goals          Problem: Occupational Therapy    Goal Priority Disciplines Outcome Interventions   Occupational Therapy Goal     OT, PT/OT Progressing    Description: Goals to be met by: 10/18/24     Patient will increase functional independence with ADLs by performing:    UE Dressing with Supervision.  LE Dressing with Supervision.  Grooming while seated at sink with Set-up Assistance.  Toileting from toilet with Stand-by Assistance for hygiene and clothing management.   All functional transfers performed with SBA                         History:     Past Medical History:   Diagnosis Date    Anxiety 12/18/2012    Back pain 12/18/2012    Cataract     Chronic pain syndrome 04/24/2016    Diabetes type 2, controlled 02/20/2016    Diabetic retinopathy     DM (diabetes mellitus) 12/18/2012    DM (diabetes mellitus), type 2, uncontrolled 11/16/2013    Gastroesophageal reflux disease without esophagitis 02/20/2016    Hyperlipidemia 12/18/2012    Insomnia 08/07/2014    Neuropathy 11/16/2013         Past Surgical History:   Procedure Laterality Date    ANGIOGRAPHY OF LOWER EXTREMITY Right 9/17/2024    Procedure: Angiogram Extremity Unilateral;  Surgeon: GINA Morton II, MD;   Location: [facility] FLR;  Service: Vascular;  Laterality: Right;  Fluro time 19.5 min  mGy 260.49    ANKLE HARDWARE REMOVAL Right 9/23/2024    Procedure: REMOVAL, HARDWARE, ANKLE;  Surgeon: Moe Liz MD;  [facility];  Service: Orthopedics;  Laterality: Right;    APPLICATION OF WOUND VACUUM-ASSISTED CLOSURE DEVICE Right 9/6/2024    Procedure: APPLICATION, WOUND VAC;  Surgeon: Moe Liz MD;  [facility];  Service: Orthopedics;  Laterality: Right;    APPLICATION, EXTERNAL FIXATION DEVICE, FOR ANKLE FRACTURE Right 7/18/2024    Procedure: APPLICATION, EXTERNAL FIXATION DEVICE, FOR ANKLE FRACTURE;  Surgeon: Moe Liz MD;  [facility];  Service: Orthopedics;  Laterality: Right;    ARTHROTOMY OF ANKLE Right 9/9/2024    Procedure: ARTHROTOMY, ANKLE;  Surgeon: Moe Liz MD;  Location: [facility] OR [address] [place] [address];  Service: Orthopedics;  Laterality: Right;    BACK SURGERY  2003    Lumbar Spine    BELOW KNEE AMPUTATION OF LOWER EXTREMITY Right 10/1/2024    Procedure: AMPUTATION, BELOW KNEE - RIGHT;  Surgeon: Moe Liz MD;  [facility];  Service: Orthopedics;  Laterality: Right;  with wound vac placement    CATARACT EXTRACTION      CLOSED REDUCTION, FRACTURE, ANKLE, TRIMALLEOLAR Right 7/18/2024    Procedure: CLOSED REDUCTION, FRACTURE, ANKLE, TRIMALLEOLAR;  Surgeon: Moe Liz MD;  Location: [facility] OR [address] [place] [address];  Service: Orthopedics;  Laterality: Right;    EPIDURAL STEROID INJECTION N/A 1/16/2019    Procedure: Injection, Steroid, Epidural Cervical;  Surgeon: Andrew Sinclair Jr., MD;  Location: [facility] ENDO;  Service: Pain Management;  Laterality: N/A;  Cervical Epidural Steroid Injection C7-T1    82675    Arrive @ 1240    EPIDURAL STEROID INJECTION N/A 2/20/2019    Procedure: Injection, Steroid, Epidural;  Surgeon: Andrew Sinclair Jr., MD;  Location: [facility] ENDO;  Service: Pain Management;  Laterality: N/A;  Lumbar Epidural  Steroid Injection L4-5    11296    Arrive @ 1215 (requests latest time)    FIXATION OF SYNDESMOSIS OF ANKLE Right 7/26/2024    Procedure: FIXATION, SYNDESMOSIS, ANKLE;  Surgeon: Moe Liz MD;  [facility];  Service: Orthopedics;  Laterality: Right;    FLAP PROCEDURE Right 9/26/2024    Procedure: CREATION, FREE FLAP;  Surgeon: Juanito Cueto DO;  [facility];  Service: Plastics;  Laterality: Right;  Spy; Def Free Flap; Micro instr., Microscope; 2 bovies, 2 teams    INJECTION, SPINE, LUMBOSACRAL, TRANSFORAMINAL APPROACH Bilateral 6/14/2024    Procedure: Bilateral L5 Transforaminal Epidural Steroid Injections;  Surgeon: Andrew Sinclair Jr., MD;  Location: [facility] PAIN MANAGEMENT;  Service: Pain Management;  Laterality: Bilateral;  @1300(given)  ASA last 6/8  Check BG  MD Sign.    IRRIGATION AND DEBRIDEMENT Right 9/6/2024    Procedure: IRRIGATION AND DEBRIDEMENT;  Surgeon: Moe Liz MD;  [facility];  Service: Orthopedics;  Laterality: Right;    IRRIGATION AND DEBRIDEMENT Right 9/20/2024    Procedure: IRRIGATION AND DEBRIDEMENT;  Surgeon: Moe Liz MD;  Location: [address];  Service: Orthopedics;  Laterality: Right;    IRRIGATION AND DEBRIDEMENT OF LOWER EXTREMITY Right 9/9/2024    Procedure: IRRIGATION AND DEBRIDEMENT, LOWER EXTREMITY - WITH WOUND VAC CHANGE, RIGHT ANKLE;  Surgeon: Moe Liz MD;  Location: [address];  Service: Orthopedics;  Laterality: Right;  WITH WOUND VAC CHANGE, RIGHT ANKLE    OPEN REDUCTION AND INTERNAL FIXATION (ORIF) OF FRACTURE OF DISTAL RADIUS Left 7/19/2024    Procedure: ORIF, FRACTURE, RADIUS, DISTAL - LEFT;  Surgeon: Moe Liz MD;  Location: [address];  Service: Orthopedics;  Laterality: Left;  LEFT, SYNTHES, C ARM    OPEN REDUCTION AND INTERNAL FIXATION (ORIF) OF INJURY OF ANKLE Right 7/26/2024    Procedure: ORIF, ANKLE;  Surgeon: Moe Liz MD;  Location: [address]  FLR;  Service: Orthopedics;  Laterality: Right;    REMOVAL OF EXTERNAL FIXATION DEVICE Right 7/26/2024    Procedure: REMOVAL, EXTERNAL FIXATION DEVICE;  Surgeon: Moe Liz MD;  Location: [facility] OR [facility] FLR;  Service: Orthopedics;  Laterality: Right;    REPLACEMENT OF WOUND VACUUM-ASSISTED CLOSURE DEVICE Right 9/17/2024    Procedure: REPLACEMENT, WOUND VAC;  Surgeon: Moe Liz MD;  [facility];  Service: Orthopedics;  Laterality: Right;  wound vac dressing exchange    REPLACEMENT OF WOUND VACUUM-ASSISTED CLOSURE DEVICE Right 9/20/2024    Procedure: REPLACEMENT, WOUND VAC;  Surgeon: Moe Liz MD;  Location: [facility] OR [facility] FLR;  Service: Orthopedics;  Laterality: Right;    REPLACEMENT OF WOUND VACUUM-ASSISTED CLOSURE DEVICE Right 9/23/2024    Procedure: REPLACEMENT, WOUND VAC; white & black sponge;  Surgeon: Moe Liz MD;  [facility];  Service: Orthopedics;  Laterality: Right;       Time Tracking:     OT Date of Treatment: 10/02/24  OT Start Time: 0929  OT Stop Time: 0953  OT Total Time (min): 24 min    Billable Minutes:Re-eval 12  Self Care/Home Management 12    10/2/2024

## 2024-10-02 NOTE — SUBJECTIVE & OBJECTIVE
"Interval HPI:   Overnight events: No acute events overnight. Patient in room 541/541 A. Blood glucose stable. BG at, above, and below goal on current insulin regimen (SSI, prandial, and basal insulin ). Steroid use- None. 1 Day Post-Op  Renal function- Normal   Vasopressors-  None       Endocrine will continue to follow and manage insulin orders inpatient.         Diet diabetic 2000 Calories (up to 75 gm per meal)     Eating:   NPO  Nausea: No  Hypoglycemia and intervention: No  Fever: No  TPN and/or TF: No      /64 (BP Location: Left arm, Patient Position: Lying)   Pulse 103   Temp 98.4 °F (36.9 °C) (Oral)   Resp 18   Ht 5' 5" (1.651 m)   Wt 93 kg (204 lb 15.7 oz)   SpO2 (!) 94%   BMI 34.11 kg/m²     Labs Reviewed and Include    Recent Labs   Lab 10/02/24  0230   GLU 92   CALCIUM 8.0*   ALBUMIN 1.7*   PROT 5.6*      K 4.0   CO2 20*      BUN 15   CREATININE 0.9   ALKPHOS 134   ALT 7*   AST 24   BILITOT 0.5     Lab Results   Component Value Date    WBC 13.53 (H) 10/02/2024    HGB 10.2 (L) 10/02/2024    HCT 31.7 (L) 10/02/2024    MCV 88 10/02/2024     (H) 10/02/2024     No results for input(s): "TSH", "FREET4" in the last 168 hours.  Lab Results   Component Value Date    HGBA1C 8.5 (H) 09/06/2024       Nutritional status:   Body mass index is 34.11 kg/m².  Lab Results   Component Value Date    ALBUMIN 1.7 (L) 10/02/2024    ALBUMIN 1.5 (L) 10/01/2024    ALBUMIN 1.6 (L) 09/30/2024     Lab Results   Component Value Date    PREALBUMIN 3 (L) 09/06/2024    PREALBUMIN 13 (L) 07/18/2024       Estimated Creatinine Clearance: 88.1 mL/min (based on SCr of 0.9 mg/dL).    Accu-Checks  Recent Labs     09/30/24  0707 09/30/24  1050 09/30/24  1614 09/30/24  2043 09/30/24  2241 09/30/24  2327 10/01/24  0734 10/01/24  1132 10/01/24  1840 10/01/24  2038   POCTGLUCOSE 167* 268* 122* 64* 65* 82 171* 191* 85 111*       Current Medications and/or Treatments Impacting Glycemic Control  Immunotherapy:  "   Immunosuppressants       None          Steroids:   Hormones (From admission, onward)      None          Pressors:    Autonomic Drugs (From admission, onward)      None          Hyperglycemia/Diabetes Medications:   Antihyperglycemics (From admission, onward)      Start     Stop Route Frequency Ordered    10/01/24 0715  insulin aspart U-100 pen 3-8 Units         -- SubQ 3 times daily with meals 10/01/24 0639    09/27/24 1013  insulin aspart U-100 pen 0-10 Units         -- SubQ Before meals & nightly PRN 09/27/24 0913    09/27/24 0900  insulin glargine U-100 (Lantus) pen 20 Units         -- SubQ Daily 09/27/24 5222

## 2024-10-02 NOTE — PROGRESS NOTES
[facility] - Surgery  Endocrinology  Progress Note    Admit Date: 9/6/2024     Reason for Consult: Management of T2DM, Hyperglycemia      Surgical Procedure and Date: S/P irrigation debridement of RLE on 09/06/2024    Diabetes diagnosis year: 1995     Home Diabetes Medications:    Humalog via OmniPod insulin pump  Mounjaro 10 mg weekly     Current pump settings:  Basal 12 am to 2.3 and 6 am to 1.55  ICR to 3 at 12 am, 2 at 4 pm  ISF to 1:20   AIT 3 hrs  Target 110-120        Patient had anaphylaxis reaction to SGLT2 inhibitors specifically Invokana     How often checking glucose at home? Dexcom G6   BG readings on regimen: Average 210's per Dexcom  Hypoglycemia on the regimen?  Yes  Missed doses on regimen?  No     Diabetes Complications include:     Hyperglycemia and Diabetic peripheral neuropathy         Complicating diabetes co morbidities:   HLD, HTN, Obesity         HPI: 64 y.o. male presents to the ED w/ complaint of altered mental status. Hx of R ankle fracture with surgery over a month ago. Patient now presents with sepsis 2/2 R ankle infection s/p ORIF on 07/26. Started on IV vanc and cefepime. Given IVFs. Blood cultures pending. Brought to OR with ortho on 09/06 for irrigation debridement of RLE. Endocrine following for BG and type 2 diabetes.     Lab Results   Component Value Date    LABA1C 10.8 (H) 08/15/2016    HGBA1C 8.5 (H) 09/06/2024         Interval HPI:   Overnight events: No acute events overnight. Patient in room 541/541 A. Blood glucose stable. BG at, above, and below goal on current insulin regimen (SSI, prandial, and basal insulin ). Steroid use- None. 1 Day Post-Op  Renal function- Normal   Vasopressors-  None       Endocrine will continue to follow and manage insulin orders inpatient.         Diet diabetic 2000 Calories (up to 75 gm per meal)     Eating:   NPO  Nausea: No  Hypoglycemia and intervention: No  Fever: No  TPN and/or TF: No      /64 (BP Location: Left arm, Patient Position:  "Lying)   Pulse 103   Temp 98.4 °F (36.9 °C) (Oral)   Resp 18   Ht 5' 5" (1.651 m)   Wt 93 kg (204 lb 15.7 oz)   SpO2 (!) 94%   BMI 34.11 kg/m²     Labs Reviewed and Include    Recent Labs   Lab 10/02/24  0230   GLU 92   CALCIUM 8.0*   ALBUMIN 1.7*   PROT 5.6*      K 4.0   CO2 20*      BUN 15   CREATININE 0.9   ALKPHOS 134   ALT 7*   AST 24   BILITOT 0.5     Lab Results   Component Value Date    WBC 13.53 (H) 10/02/2024    HGB 10.2 (L) 10/02/2024    HCT 31.7 (L) 10/02/2024    MCV 88 10/02/2024     (H) 10/02/2024     No results for input(s): "TSH", "FREET4" in the last 168 hours.  Lab Results   Component Value Date    HGBA1C 8.5 (H) 09/06/2024       Nutritional status:   Body mass index is 34.11 kg/m².  Lab Results   Component Value Date    ALBUMIN 1.7 (L) 10/02/2024    ALBUMIN 1.5 (L) 10/01/2024    ALBUMIN 1.6 (L) 09/30/2024     Lab Results   Component Value Date    PREALBUMIN 3 (L) 09/06/2024    PREALBUMIN 13 (L) 07/18/2024       Estimated Creatinine Clearance: 88.1 mL/min (based on SCr of 0.9 mg/dL).    Accu-Checks  Recent Labs     09/30/24  0707 09/30/24  1050 09/30/24  1614 09/30/24  2043 09/30/24  2241 09/30/24  2327 10/01/24  0734 10/01/24  1132 10/01/24  1840 10/01/24  2038   POCTGLUCOSE 167* 268* 122* 64* 65* 82 171* 191* 85 111*       Current Medications and/or Treatments Impacting Glycemic Control  Immunotherapy:    Immunosuppressants       None          Steroids:   Hormones (From admission, onward)      None          Pressors:    Autonomic Drugs (From admission, onward)      None          Hyperglycemia/Diabetes Medications:   Antihyperglycemics (From admission, onward)      Start     Stop Route Frequency Ordered    10/01/24 0715  insulin aspart U-100 pen 3-8 Units         -- SubQ 3 times daily with meals 10/01/24 0639    09/27/24 1013  insulin aspart U-100 pen 0-10 Units         -- SubQ Before meals & nightly PRN 09/27/24 0913    09/27/24 0900  insulin glargine U-100 (Lantus) pen " 20 Units         -- SubQ Daily 09/27/24 0752            ASSESSMENT and PLAN    Cardiac/Vascular  Hyperlipidemia  On statin per ADA guidelines       Endocrine  Uncontrolled type 2 diabetes mellitus with hyperglycemia  BG goal 140-180    - Lantus (Insulin Glargine) 20 units daily   - Novolog 3-8 units TIDWM (Administer   3 units if patient eats 25% of meal, 5  units if patient eats 50% of meal, administer 6 units if the patient eats 75% of meal and administer    8   units if patient eats 100% of meal. Hold if patient eats less than 25% of meal).   - [facility] [place]SI (150/25)  - BG checks AC/HS  - Hypoglycemia protocol in place    ** Please notify Endocrine for any change and/or advance in diet**  ** Please call Endocrine for any BG related issues **    Discharge Planning:   TBD. Please notify endocrinology prior to discharge.        Orthopedic  * Surgical wound breakdown  Optimize BG control to improve wound healing  Managed per primary team               Brandyn Malin, DNP, FNP  Endocrinology  [facility] - Surgery

## 2024-10-02 NOTE — PLAN OF CARE
10/02/24 1235   Post-Acute Status   Post-Acute Authorization Placement   Post-Acute Placement Status Pending payor review/awaiting authorization (if required)   Discharge Plan   Discharge Plan A Rehab     Pt accepted by [facility], authorization submitted.  team to follow.    Aida Rosa LMSW  Case Management   [facility]   Ext. [phone]

## 2024-10-02 NOTE — ASSESSMENT & PLAN NOTE
BG goal 140-180    - Lantus (Insulin Glargine) 20 units daily   - Novolog 3-8 units TIDWM (Administer   3 units if patient eats 25% of meal, 5  units if patient eats 50% of meal, administer 6 units if the patient eats 75% of meal and administer    8   units if patient eats 100% of meal. Hold if patient eats less than 25% of meal).   - [facility] SSI (150/25)  - BG checks AC/HS  - Hypoglycemia protocol in place    ** Please notify Endocrine for any change and/or advance in diet**  ** Please call Endocrine for any BG related issues **    Discharge Planning:   TBD. Please notify endocrinology prior to discharge.

## 2024-10-02 NOTE — PLAN OF CARE
Problem: Adult Inpatient Plan of Care  Goal: Absence of Hospital-Acquired Illness or Injury  Outcome: Progressing  Goal: Optimal Comfort and Wellbeing  Outcome: Progressing  Goal: Readiness for Transition of Care  Outcome: Progressing  Goal: Plan of Care Review  Outcome: Progressing  Goal: Patient-Specific Goal (Individualized)  Outcome: Progressing     Problem: Fall Injury Risk  Goal: Absence of Fall and Fall-Related Injury  Outcome: Progressing     Problem: Sepsis/Septic Shock  Goal: Optimal Coping  Outcome: Progressing  Goal: Absence of Bleeding  Outcome: Progressing  Goal: Blood Glucose Level Within Targeted Range  Outcome: Progressing  Goal: Absence of Infection Signs and Symptoms  Outcome: Progressing  Goal: Optimal Nutrition Intake  Outcome: Progressing     Problem: Acute Kidney Injury/Impairment  Goal: Fluid and Electrolyte Balance  Outcome: Progressing  Goal: Improved Oral Intake  Outcome: Progressing  Goal: Effective Renal Function  Outcome: Progressing     Problem: Wound  Goal: Optimal Coping  Outcome: Progressing  Goal: Optimal Functional Ability  Outcome: Progressing  Goal: Absence of Infection Signs and Symptoms  Outcome: Progressing  Goal: Improved Oral Intake  Outcome: Progressing  Goal: Optimal Pain Control and Function  Outcome: Progressing  Goal: Skin Health and Integrity  Outcome: Progressing  Goal: Optimal Wound Healing  Outcome: Progressing     Problem: Diabetes Comorbidity  Goal: Blood Glucose Level Within Targeted Range  Outcome: Progressing     Problem: Skin Injury Risk Increased  Goal: Skin Health and Integrity  Outcome: Progressing     Problem: Infection  Goal: Absence of Infection Signs and Symptoms  Outcome: Progressing   Pt is aaox4, pt continues to state he has no appetite and expresses reluctance to eat. Pt initially refused accuchecks  but became agreeable after educating on the importance of blood sugar monitoring. Pt sugar level was 59 mg/dl, offered pt some juice and crackers  but refused. Dr. Malin was notified of pt sugar level and refusal to have some juice. IV dextrose was administered prn to address pt hypoglycemic episode. Safety measures remain in place, bed in the lowest position with wheels locked, call light and personal belongings within reach. Continue plan of care.

## 2024-10-02 NOTE — PLAN OF CARE
Problem: Adult Inpatient Plan of Care  Goal: Absence of Hospital-Acquired Illness or Injury  Outcome: Progressing  Goal: Optimal Comfort and Wellbeing  Outcome: Progressing  Goal: Readiness for Transition of Care  Outcome: Progressing  Goal: Plan of Care Review  Outcome: Progressing  Goal: Patient-Specific Goal (Individualized)  Outcome: Progressing     Problem: Fall Injury Risk  Goal: Absence of Fall and Fall-Related Injury  Outcome: Progressing     Problem: Sepsis/Septic Shock  Goal: Optimal Coping  Outcome: Progressing  Goal: Absence of Bleeding  Outcome: Progressing  Goal: Blood Glucose Level Within Targeted Range  Outcome: Progressing  Goal: Absence of Infection Signs and Symptoms  Outcome: Progressing  Goal: Optimal Nutrition Intake  Outcome: Progressing     Problem: Acute Kidney Injury/Impairment  Goal: Fluid and Electrolyte Balance  Outcome: Progressing  Goal: Improved Oral Intake  Outcome: Progressing  Goal: Effective Renal Function  Outcome: Progressing     Problem: Wound  Goal: Optimal Coping  Outcome: Progressing  Goal: Optimal Functional Ability  Outcome: Progressing  Goal: Absence of Infection Signs and Symptoms  Outcome: Progressing  Goal: Improved Oral Intake  Outcome: Progressing  Goal: Optimal Pain Control and Function  Outcome: Progressing  Goal: Skin Health and Integrity  Outcome: Progressing  Goal: Optimal Wound Healing  Outcome: Progressing     Problem: Diabetes Comorbidity  Goal: Blood Glucose Level Within Targeted Range  Outcome: Progressing     Problem: Skin Injury Risk Increased  Goal: Skin Health and Integrity  Outcome: Progressing     Problem: Infection  Goal: Absence of Infection Signs and Symptoms  Outcome: Progressing

## 2024-10-02 NOTE — ASSESSMENT & PLAN NOTE
1U 10/1 pre op for BKA as hg 7.5 and 1 intra-op and hg now 10 on 10/2  Received 2 U during plastic surgery flap attempt on 9/27 with stable hg post op  - Hgb fluctuating and own to 7.6 on 9/25 from 8.3 on 9/24. No clinical signs of bleeding and will monitor.   - Anemia is likely due to acute blood loss which was from surgery. Most recent hemoglobin and hematocrit are listed below.  Recent Labs     09/30/24  0434 10/01/24  0236 10/02/24  0230   HGB 7.8* 7.5* 10.2*   HCT 24.5* 21.8* 31.7*       Plan  - Monitor serial CBC: Daily  - Transfuse PRBC if patient becomes hemodynamically unstable, symptomatic or H/H drops below 7/21.  - Patient has not received any PRBC transfusions to date  - Patient's anemia is currently stable and monitor. No indication for blood transfusion at this time.

## 2024-10-02 NOTE — SUBJECTIVE & OBJECTIVE
Principal Problem:Surgical wound breakdown    Principal Orthopedic Problem: As above, s/p R BKA 10/1    Interval History: Patient seen and examined at bedside. NAEON. Afebrile, VSS. Patient doing well. No complaints. Pain well controlled. Patient ambulated 9 ft with PT today. Encouraged to continue working with therapy. Hgb 10.2. Wound vac with good seal.      Review of patient's allergies indicates:   Allergen Reactions    Invokana [canagliflozin] Anaphylaxis    Percocet [oxycodone-acetaminophen] Nausea Only and Hallucinations    Biaxin [clarithromycin]     Hydrocodone Other (See Comments)     Dizzy/nausea/hallucinations    Sulfa (sulfonamide antibiotics) Nausea Only and Rash       Current Facility-Administered Medications   Medication    0.9%  NaCl infusion (for blood administration)    0.9%  NaCl infusion (for blood administration)    0.9%  NaCl infusion (for blood administration)    0.9%  NaCl infusion (for blood administration)    acetaminophen tablet 1,000 mg    albuterol-ipratropium 2.5 mg-0.5 mg/3 mL nebulizer solution 3 mL    amLODIPine tablet 10 mg    apixaban tablet 2.5 mg    [START ON 10/3/2024] aspirin EC tablet 81 mg    calcium carbonate 200 mg calcium (500 mg) chewable tablet 1,000 mg    celecoxib capsule 200 mg    dextrose 10% bolus 125 mL 125 mL    dextrose 10% bolus 125 mL 125 mL    dextrose 10% bolus 125 mL 125 mL    dextrose 10% bolus 125 mL 125 mL    dextrose 10% bolus 250 mL 250 mL    dextrose 10% bolus 250 mL 250 mL    dextrose 10% bolus 250 mL 250 mL    dextrose 10% bolus 250 mL 250 mL    gabapentin capsule 300 mg    glucagon (human recombinant) injection 1 mg    glucagon (human recombinant) injection 1 mg    glucose chewable tablet 16 g    glucose chewable tablet 24 g    hydrALAZINE tablet 50 mg    hydrALAZINE tablet 50 mg    insulin aspart U-100 pen 0-10 Units    insulin aspart U-100 pen 3-8 Units    insulin glargine U-100 (Lantus) pen 20 Units    LORazepam tablet 0.5 mg    losartan tablet  "50 mg    melatonin tablet 6 mg    methocarbamoL tablet 500 mg    nortriptyline capsule 50 mg    ondansetron disintegrating tablet 8 mg    oxacillin 12 g in  mL CONTINUOUS INFUSION    pantoprazole EC tablet 40 mg    polyethylene glycol packet 17 g    prochlorperazine injection Soln 5 mg    ropivacaine 0.2% Perineural Pump infusion 500 ML    senna tablet 8.6 mg    sodium bicarbonate tablet 650 mg    sucralfate 100 mg/mL suspension 1 g    traZODone tablet 50 mg    vitamin D 1000 units tablet 1,000 Units     Objective:     Vital Signs (Most Recent):  Temp: 98.1 °F (36.7 °C) (10/02/24 1522)  Pulse: 99 (10/02/24 1522)  Resp: 18 (10/02/24 1522)  BP: (!) 162/71 (10/02/24 1522)  SpO2: 95 % (10/02/24 1522) Vital Signs (24h Range):  Temp:  [98 °F (36.7 °C)-99.3 °F (37.4 °C)] 98.1 °F (36.7 °C)  Pulse:  [] 99  Resp:  [15-25] 18  SpO2:  [94 %-97 %] 95 %  BP: (126-203)/(59-85) 162/71     Weight: 93 kg (204 lb 15.7 oz)  Height: 5' 5" (165.1 cm)  Body mass index is 34.11 kg/m².      Intake/Output Summary (Last 24 hours) at 10/2/2024 1643  Last data filed at 10/2/2024 1503  Gross per 24 hour   Intake 1873 ml   Output 600 ml   Net 1273 ml        Ortho/SPM Exam  A&O x 3  Regular Rate  Non-Labored Respirations    RLE:  Prevena with good seal and suction  Able to flex/extend knee with minimal pain  SILT  Compartments soft         Significant Labs: CBC:   Recent Labs   Lab 10/01/24  0236 10/02/24  0230   WBC 10.36 13.53*   HGB 7.5* 10.2*   HCT 21.8* 31.7*   * 497*     CMP:   Recent Labs   Lab 10/01/24  0236 10/02/24  0230    137   K 4.1 4.0    109   CO2 20* 20*    92   BUN 13 15   CREATININE 0.8 0.9   CALCIUM 7.7* 8.0*   PROT 5.0* 5.6*   ALBUMIN 1.5* 1.7*   BILITOT 0.2 0.5   ALKPHOS 125 134   AST 12 24   ALT 10 7*   ANIONGAP 8 8     All pertinent labs within the past 24 hours have been reviewed.    Significant Imaging: I have reviewed and interpreted all pertinent imaging results/findings.  "

## 2024-10-02 NOTE — NURSING TRANSFER
Nursing Transfer Note      10/1/2024   7:42 PM    Nurse giving handoff:tevin bender  Nurse receiving handoff:tevin carroll    Reason patient is being transferred: post procedure    Transfer To:   541  Transfer via bed    Transfer with n/a    Transported by RN    Transfer Vital Signs: see flowsheets    Order for Tele Monitor? No    Additional Lines: wound vac    Medicines sent: n/a    Any special needs or follow-up needed: routine    Patient belongings transferred with patient: no in room 541    Chart send with patient: Yes    Notified: friend    Patient reassessed at: 10/1/24 1930   Upon arrival to floor: bed in lowest position

## 2024-10-03 LAB
ALBUMIN SERPL BCP-MCNC: 1.6 G/DL (ref 3.5–5.2)
ALP SERPL-CCNC: 139 U/L (ref 55–135)
ALT SERPL W/O P-5'-P-CCNC: 10 U/L (ref 10–44)
ANION GAP SERPL CALC-SCNC: 12 MMOL/L (ref 8–16)
AST SERPL-CCNC: 28 U/L (ref 10–40)
BILIRUB SERPL-MCNC: 0.4 MG/DL (ref 0.1–1)
BLD PROD TYP BPU: NORMAL
BLD PROD TYP BPU: NORMAL
BLOOD UNIT EXPIRATION DATE: NORMAL
BLOOD UNIT EXPIRATION DATE: NORMAL
BLOOD UNIT TYPE CODE: 5100
BLOOD UNIT TYPE CODE: 5100
BLOOD UNIT TYPE: NORMAL
BLOOD UNIT TYPE: NORMAL
BUN SERPL-MCNC: 14 MG/DL (ref 8–23)
CALCIUM SERPL-MCNC: 7.9 MG/DL (ref 8.7–10.5)
CHLORIDE SERPL-SCNC: 104 MMOL/L (ref 95–110)
CO2 SERPL-SCNC: 19 MMOL/L (ref 23–29)
CODING SYSTEM: NORMAL
CODING SYSTEM: NORMAL
CREAT SERPL-MCNC: 0.9 MG/DL (ref 0.5–1.4)
CROSSMATCH INTERPRETATION: NORMAL
CROSSMATCH INTERPRETATION: NORMAL
DISPENSE STATUS: NORMAL
DISPENSE STATUS: NORMAL
ERYTHROCYTE [DISTWIDTH] IN BLOOD BY AUTOMATED COUNT: 18.3 % (ref 11.5–14.5)
EST. GFR  (NO RACE VARIABLE): >60 ML/MIN/1.73 M^2
GLUCOSE SERPL-MCNC: 149 MG/DL (ref 70–110)
HCT VFR BLD AUTO: 27.2 % (ref 40–54)
HGB BLD-MCNC: 9.2 G/DL (ref 14–18)
MCH RBC QN AUTO: 30.2 PG (ref 27–31)
MCHC RBC AUTO-ENTMCNC: 33.8 G/DL (ref 32–36)
MCV RBC AUTO: 89 FL (ref 82–98)
PLATELET # BLD AUTO: 449 K/UL (ref 150–450)
PMV BLD AUTO: 9.8 FL (ref 9.2–12.9)
POCT GLUCOSE: 122 MG/DL (ref 70–110)
POCT GLUCOSE: 206 MG/DL (ref 70–110)
POCT GLUCOSE: 229 MG/DL (ref 70–110)
POCT GLUCOSE: 285 MG/DL (ref 70–110)
POCT GLUCOSE: 344 MG/DL (ref 70–110)
POTASSIUM SERPL-SCNC: 4.1 MMOL/L (ref 3.5–5.1)
PROT SERPL-MCNC: 5.5 G/DL (ref 6–8.4)
RBC # BLD AUTO: 3.05 M/UL (ref 4.6–6.2)
SODIUM SERPL-SCNC: 135 MMOL/L (ref 136–145)
TRANS ERYTHROCYTES VOL PATIENT: NORMAL ML
TRANS ERYTHROCYTES VOL PATIENT: NORMAL ML
WBC # BLD AUTO: 13.45 K/UL (ref 3.9–12.7)

## 2024-10-03 PROCEDURE — 25000003 PHARM REV CODE 250: Performed by: SURGERY

## 2024-10-03 PROCEDURE — 11000001 HC ACUTE MED/SURG PRIVATE ROOM

## 2024-10-03 PROCEDURE — A4216 STERILE WATER/SALINE, 10 ML: HCPCS | Performed by: INTERNAL MEDICINE

## 2024-10-03 PROCEDURE — C1751 CATH, INF, PER/CENT/MIDLINE: HCPCS

## 2024-10-03 PROCEDURE — 76937 US GUIDE VASCULAR ACCESS: CPT

## 2024-10-03 PROCEDURE — 80053 COMPREHEN METABOLIC PANEL: CPT | Performed by: SURGERY

## 2024-10-03 PROCEDURE — 99231 SBSQ HOSP IP/OBS SF/LOW 25: CPT | Mod: ,,, | Performed by: ANESTHESIOLOGY

## 2024-10-03 PROCEDURE — 85027 COMPLETE CBC AUTOMATED: CPT | Performed by: SURGERY

## 2024-10-03 PROCEDURE — 25000003 PHARM REV CODE 250: Performed by: INTERNAL MEDICINE

## 2024-10-03 PROCEDURE — 99232 SBSQ HOSP IP/OBS MODERATE 35: CPT | Mod: ,,, | Performed by: NURSE PRACTITIONER

## 2024-10-03 PROCEDURE — 25000003 PHARM REV CODE 250: Performed by: HOSPITALIST

## 2024-10-03 PROCEDURE — 02HV33Z INSERTION OF INFUSION DEVICE INTO SUPERIOR VENA CAVA, PERCUTANEOUS APPROACH: ICD-10-PCS | Performed by: INTERNAL MEDICINE

## 2024-10-03 PROCEDURE — 63600175 PHARM REV CODE 636 W HCPCS: Performed by: SURGERY

## 2024-10-03 PROCEDURE — 36573 INSJ PICC RS&I 5 YR+: CPT

## 2024-10-03 PROCEDURE — 25000003 PHARM REV CODE 250

## 2024-10-03 PROCEDURE — 36415 COLL VENOUS BLD VENIPUNCTURE: CPT | Performed by: SURGERY

## 2024-10-03 RX ORDER — INSULIN ASPART 100 [IU]/ML
2-6 INJECTION, SOLUTION INTRAVENOUS; SUBCUTANEOUS
Status: DISCONTINUED | OUTPATIENT
Start: 2024-10-03 | End: 2024-10-04 | Stop reason: HOSPADM

## 2024-10-03 RX ORDER — INSULIN GLARGINE 100 [IU]/ML
16 INJECTION, SOLUTION SUBCUTANEOUS DAILY
Status: DISCONTINUED | OUTPATIENT
Start: 2024-10-04 | End: 2024-10-04 | Stop reason: HOSPADM

## 2024-10-03 RX ORDER — TRAZODONE HYDROCHLORIDE 100 MG/1
100 TABLET ORAL NIGHTLY
Status: DISCONTINUED | OUTPATIENT
Start: 2024-10-03 | End: 2024-10-04 | Stop reason: HOSPADM

## 2024-10-03 RX ORDER — METHOCARBAMOL 500 MG/1
500 TABLET, FILM COATED ORAL EVERY 8 HOURS
Status: DISCONTINUED | OUTPATIENT
Start: 2024-10-04 | End: 2024-10-04 | Stop reason: HOSPADM

## 2024-10-03 RX ORDER — TRAMADOL HYDROCHLORIDE 50 MG/1
50 TABLET ORAL EVERY 6 HOURS PRN
Status: DISCONTINUED | OUTPATIENT
Start: 2024-10-03 | End: 2024-10-04 | Stop reason: HOSPADM

## 2024-10-03 RX ORDER — ATORVASTATIN CALCIUM 40 MG/1
40 TABLET, FILM COATED ORAL NIGHTLY
Status: DISCONTINUED | OUTPATIENT
Start: 2024-10-03 | End: 2024-10-04 | Stop reason: HOSPADM

## 2024-10-03 RX ORDER — SODIUM CHLORIDE 0.9 % (FLUSH) 0.9 %
10 SYRINGE (ML) INJECTION
Status: DISCONTINUED | OUTPATIENT
Start: 2024-10-03 | End: 2024-10-04 | Stop reason: HOSPADM

## 2024-10-03 RX ORDER — ACETAMINOPHEN 500 MG
1000 TABLET ORAL EVERY 8 HOURS
Status: DISCONTINUED | OUTPATIENT
Start: 2024-10-04 | End: 2024-10-04 | Stop reason: HOSPADM

## 2024-10-03 RX ORDER — SODIUM CHLORIDE 0.9 % (FLUSH) 0.9 %
10 SYRINGE (ML) INJECTION EVERY 6 HOURS
Status: DISCONTINUED | OUTPATIENT
Start: 2024-10-03 | End: 2024-10-04 | Stop reason: HOSPADM

## 2024-10-03 RX ADMIN — Medication 6 MG: at 09:10

## 2024-10-03 RX ADMIN — GABAPENTIN 300 MG: 300 CAPSULE ORAL at 08:10

## 2024-10-03 RX ADMIN — OXACILLIN 12 G: 2 INJECTION, POWDER, FOR SOLUTION INTRAMUSCULAR; INTRAVENOUS at 12:10

## 2024-10-03 RX ADMIN — SODIUM BICARBONATE 650 MG: 650 TABLET ORAL at 08:10

## 2024-10-03 RX ADMIN — PANTOPRAZOLE SODIUM 40 MG: 40 TABLET, DELAYED RELEASE ORAL at 08:10

## 2024-10-03 RX ADMIN — GABAPENTIN 300 MG: 300 CAPSULE ORAL at 09:10

## 2024-10-03 RX ADMIN — NORTRIPTYLINE HYDROCHLORIDE 50 MG: 25 CAPSULE ORAL at 09:10

## 2024-10-03 RX ADMIN — TRAMADOL HYDROCHLORIDE 50 MG: 50 TABLET, COATED ORAL at 08:10

## 2024-10-03 RX ADMIN — INSULIN GLARGINE 20 UNITS: 100 INJECTION, SOLUTION SUBCUTANEOUS at 08:10

## 2024-10-03 RX ADMIN — ACETAMINOPHEN 1000 MG: 500 TABLET ORAL at 12:10

## 2024-10-03 RX ADMIN — INSULIN ASPART 3 UNITS: 100 INJECTION, SOLUTION INTRAVENOUS; SUBCUTANEOUS at 09:10

## 2024-10-03 RX ADMIN — Medication 10 ML: at 05:10

## 2024-10-03 RX ADMIN — LOSARTAN POTASSIUM 50 MG: 25 TABLET, FILM COATED ORAL at 08:10

## 2024-10-03 RX ADMIN — METHOCARBAMOL 500 MG: 500 TABLET ORAL at 06:10

## 2024-10-03 RX ADMIN — HYDRALAZINE HYDROCHLORIDE 50 MG: 50 TABLET ORAL at 09:10

## 2024-10-03 RX ADMIN — HYDRALAZINE HYDROCHLORIDE 50 MG: 50 TABLET ORAL at 02:10

## 2024-10-03 RX ADMIN — TRAZODONE HYDROCHLORIDE 100 MG: 100 TABLET ORAL at 09:10

## 2024-10-03 RX ADMIN — CELECOXIB 200 MG: 200 CAPSULE ORAL at 08:10

## 2024-10-03 RX ADMIN — INSULIN ASPART 4 UNITS: 100 INJECTION, SOLUTION INTRAVENOUS; SUBCUTANEOUS at 01:10

## 2024-10-03 RX ADMIN — HYDRALAZINE HYDROCHLORIDE 50 MG: 50 TABLET ORAL at 06:10

## 2024-10-03 RX ADMIN — INSULIN ASPART 8 UNITS: 100 INJECTION, SOLUTION INTRAVENOUS; SUBCUTANEOUS at 12:10

## 2024-10-03 RX ADMIN — INSULIN ASPART 6 UNITS: 100 INJECTION, SOLUTION INTRAVENOUS; SUBCUTANEOUS at 05:10

## 2024-10-03 RX ADMIN — APIXABAN 2.5 MG: 2.5 TABLET, FILM COATED ORAL at 08:10

## 2024-10-03 RX ADMIN — SUCRALFATE 1 G: 1 SUSPENSION ORAL at 12:10

## 2024-10-03 RX ADMIN — ACETAMINOPHEN 1000 MG: 500 TABLET ORAL at 06:10

## 2024-10-03 RX ADMIN — AMLODIPINE BESYLATE 10 MG: 10 TABLET ORAL at 08:10

## 2024-10-03 RX ADMIN — INSULIN ASPART 4 UNITS: 100 INJECTION, SOLUTION INTRAVENOUS; SUBCUTANEOUS at 06:10

## 2024-10-03 RX ADMIN — CHOLECALCIFEROL TAB 25 MCG (1000 UNIT) 1000 UNITS: 25 TAB at 08:10

## 2024-10-03 RX ADMIN — ASPIRIN 81 MG: 81 TABLET, COATED ORAL at 08:10

## 2024-10-03 RX ADMIN — POLYETHYLENE GLYCOL 3350 17 G: 17 POWDER, FOR SOLUTION ORAL at 09:10

## 2024-10-03 RX ADMIN — METHOCARBAMOL 500 MG: 500 TABLET ORAL at 12:10

## 2024-10-03 RX ADMIN — SUCRALFATE 1 G: 1 SUSPENSION ORAL at 05:10

## 2024-10-03 RX ADMIN — ATORVASTATIN CALCIUM 40 MG: 40 TABLET, FILM COATED ORAL at 09:10

## 2024-10-03 RX ADMIN — APIXABAN 2.5 MG: 2.5 TABLET, FILM COATED ORAL at 09:10

## 2024-10-03 NOTE — HOSPITAL COURSE
10/2/24: Participated w/ OT. Sit <> Stand Transfer with contact guard assistance and minimum assistance  with  rolling walker. Patient completed Bed <> Chair Transfer using Step Transfer technique with contact guard assistance with rolling walker. LORNE Galindo.

## 2024-10-03 NOTE — ASSESSMENT & PLAN NOTE
- Present on admit. Patient noted to have high grade stenosis on CTA of right leg. Vascular surgery consulted and patient taken to OR and had unilateral angiogram of right leg and had PT/AT artery stenosis on 9/17 and underwent PTA of right PT/AT arteries. Appreciate Vascular surgery assistance on case.   - Patient s/p free flap attempt with Plastics and did attempt donor flap from abdomen/flank area but unsuccessful so Orthopedics ended up doing right BKA as unable to cover wounds to right leg with free flap.   - Continue Aspirin and Lipitor daily to treat PAD.

## 2024-10-03 NOTE — PROGRESS NOTES
[facility] Medicine  Progress Note    Patient Name: Cortes Schroeder  MRN: [id number]  Patient Class: IP- Inpatient   Admission Date: 9/6/2024  Length of Stay: 27 days  Attending Physician: Raisa Kearns MD  Primary Care Provider: Andrew Sinclair Jr., MD        Subjective:     Principal Problem:Surgical wound breakdown        HPI:  Cortes Schroeder is a 64 y.o. M with PMHx of anxiety, T2DM, GERD, HLD, HTN who presented to ED for AMS. He had a fall in July that resulted in R trimalleolar fracture and L distal radius fracture. He had external fixation on 07/18 and then ORIF on 07/26 with Dr. Liz. He was prescribed clinda 300 mg on 08/28 for a ten day course. Patient was doing well when he acutely became altered and not acting like himself a few hours ago. Patient family member drove him here from [facility] for ortho consult. R medial ankle wound dehisced with redness and swelling around it. No CPM fever, cough, N/V/D or seizure.    In ED: T max 99.1. SIRS 2/4 with WBC 18 and . CMP notable for Na 127, Cr 1.7, . Phos 1.9. .3. BNP 73. Trop neg. A1c 8.5. R tib fib XR notes There are 2 abandoned anterior-posteriorly oriented pin tracks in the right mid tibial shaft.  On AP views, each of these pin tracks demonstrates a small faint internal density, possibly representing a sequestrum. Ortho and endocrine consulted. Given IV vanc and IV clindamycin. Given 2.7L IVFs. Admitted to hospital medicine for sepsis 2/2 infected hardware.     Overview/Hospital Course:  Cortes Schroeder is a 65 y/o male admitted to hospital medicine for sepsis related to right ankle from surgical wound infection in patient with recent ORIF of right ankle fracture done on 7/26/2024. Patient started on empiric IV Vancomycin and IV Cefepime and given IVF's as per sepsis protocol. Orthopedics consulted and patient taken to OR on 9/6/2024 and had irrigation debridement of right surgical incision and  placement of wound vac.Endocrine consulted to assist in management of his diabetes while in hospital. Blood cultures from admit returned with MSSA. Infectious Disease consulted. Wound culture returned positive for Group B streptococcus and MSSA. Echo done due to bacteremia without concern for vegetations. ID changed antibiotics to IV Oxacillin. Patient with new cough and worsening WBC. CTA chest negative for PE but notes small area of ground glass changes to RUL. Patient placed on 5 day course of po Doxycycline to treat as per ID and completed for possible pneumonia. Patient taken back to OR on 09/09/2024 with Ortho and had another irrigation and debridement and replacement of wound vac to right ankle. Plastics consulted for flap evaluation and recommended vascular evaluation. CTA right lower extremity done and showed moderate atherosclerosis and multifocal high grade stenosis throughout right calf arteries. Dietary consulted for malnutrition and started on Boost supplementation to maximize his nutrition for a flap and wound healing. Vascular surgery consulted per Plastics recs as they would like to pre-optimize blood flow prior to any free flap or pedicled propellar flap. Vascular recommended angiogram of right leg but patient developed DEEPALI. Nephrology consulted and suspected ATN related to contrast nephropathy. Patient placed on supportive care. DEEPALI resolved. Patyoanetn taken for unilateral angiogram by Vascular on 9/17 and underwent PTA of right posterior tibial artery and right anterior tibial artery. After revascularization of right leg pl;an to do flap but working on timing with Ortho and Plastics. Patient to go back to OR again on 9/20 for another irrigation and debridement and replacement of wound vac to right ankle wound by Ortho. Plastics plans propeller flap to right ankle on 9/25 and then will need to remain in hospital for 1 week after flap for dangling protocol and monitoring of flap per Plastics. Repeat  blood cultures from 9/8 and 9/10 no growth. Patient taken back to OR on 9/20 with Dr. Moe Liz and had another I&D of right ankle and wound and replacement of wound vac. Patient to remain non weight bearing post-op and Plastics plans flap placement while in hospital on 9/25. Patient to go back to OR on for another I&D and wound vac change on 9/23. Patient taken back to OR on 9/23 with Dr. Liz and had another I&D and wound vac change with Orthopedics. Ortho noted anterolateral foot eschar developing. Distal lateral wound breakdown and ortho opened up majority of lateral wound and excised surrounding skin and subcutaneous tissue. Ortho removed all ankle hardware. Wound vacs placed medially and laterally. Patient re-evaluated by Plastics on 9/25 and state due to lateral wound in addition to anterior wound no longer a candidate for propellar flap and plastic will need to do free flap to cover both wounds. Plastics concerned if no adequate vascular targets, will be unable to cover and BKA is only option. Patient not very happy. Patient went to OR with Plastics on 9/26. Plastics tried mulitple attempyy for free falp to cover wounds to right ankle but had francisco poor targets and thus unsuccessful in placing free flap to coevr ankle wounds. After discussion with patient and Orthopedics plan to proceed for right BKA. Patient taken to OR on 10/1/2024 with Dr. Moe Liz and underwent right lower extremity BKA. Perineural catheter placed by Anesthesia for post-op pain management. Infectious disease made new recommendations based on BKA and recommended:  Continue IV Oxacillin 12 g q 24 hours continuous infusion until 10/20.  This will complete 6 weeks of IV antibiotics from first negative blood culture - a sufficient duration to treat endocarditis if present.  No need for ROBERTA.  This will also complete the 14 days of post-BKA antibiotics recommended by Orthopedics.    Recommend placement after hosp discharge for PT/OT,  ADL assistance, wound care and IV abx - patient to d/c to [facility].  Accepting facility to perform weekly labs (cbc, cmp, crp), and PICC line dressing changes.  Facility to fax results to [facility] Fax Number: [phone].  PT/OT re consulted post-op after BKA and recommending high intensity therapy. After discussion with patient, inpatient rehab referrals sent. Patient accepted to [facility] which was his first choice and authorization requested. Auth obtained. Order for PICC lien placed on 10/3 and plan to discharge to [facility] on 10/4.     Interval History: Patient states he did not sleep well at all last night and very tired today. Patient states he is trying to eat but just not a fan of our hospital food. Infectious Disease came by yesterday and gave final recs for IV Oxacillin and recommending to continue continuous infusion through 10/20. Order placed for PICC line placement today and patient updated. Patient reports pain at site of right BKA stump controlled and Prevena wound vac in place to right BKA site. Patient has been accepted to [facility] for discharge. Insurance authorization received. Labs reviewed. Hgb 9.2 and slightly down from 10.2 yesterday. No clinical signs of bleeding noted. Blood sugars controlled. Appreciate Endocrine assistance on case.     Review of Systems   Constitutional:  Negative for fever.   Respiratory:  Negative for shortness of breath.    Cardiovascular:  Negative for chest pain.   Gastrointestinal:  Negative for nausea and vomiting.   Musculoskeletal:  Positive for arthralgias (Right BKA stump).   Psychiatric/Behavioral:  Positive for sleep disturbance (Difficulty sleeping). Negative for agitation and confusion.      Objective:     Vital Signs (Most Recent):  Temp: 98 °F (36.7 °C) (10/03/24 1239)  Pulse: 105 (10/03/24 1239)  Resp: 18 (10/03/24 1239)  BP: 127/59 (10/03/24 1239)  SpO2: 92 % (10/03/24 1239) on room air Vital Signs (24h Range):  Temp:   [98 °F (36.7 °C)-98.9 °F (37.2 °C)] 98 °F (36.7 °C)  Pulse:  [] 105  Resp:  [18-20] 18  SpO2:  [92 %-95 %] 92 %  BP: (127-175)/(59-72) 127/59     Weight: 93 kg (204 lb 15.7 oz)  Body mass index is 34.11 kg/m².    Intake/Output Summary (Last 24 hours) at 10/3/2024 1402  Last data filed at 10/3/2024 0601  Gross per 24 hour   Intake 300 ml   Output 1500 ml   Net -1200 ml         Physical Exam  Vitals and nursing note reviewed.   Constitutional:       General: He is awake. He is not in acute distress.     Appearance: Normal appearance. He is well-developed. He is obese.      Comments: Patient sitting up in bed in no distress and in good spirits and comfortable.    Eyes:      Conjunctiva/sclera: Conjunctivae normal.   Cardiovascular:      Rate and Rhythm: Normal rate and regular rhythm.      Heart sounds: Normal heart sounds. No murmur heard.  Pulmonary:      Effort: Pulmonary effort is normal. No respiratory distress.      Breath sounds: Normal breath sounds. No wheezing.   Abdominal:      General: Abdomen is flat. Bowel sounds are normal. There is no distension.      Palpations: Abdomen is soft.      Tenderness: There is no abdominal tenderness.      Comments: Bandages to left flank, Bandages are clean and dry and intact from donor site.   Musculoskeletal:      Left lower leg: No edema.      Comments: S/p BKA to RLE with Prevena wound vac in place   Skin:     General: Skin is warm.      Findings: No erythema.   Neurological:      Mental Status: He is alert and oriented to person, place, and time.   Psychiatric:         Mood and Affect: Mood normal.         Behavior: Behavior normal. Behavior is cooperative.         Thought Content: Thought content normal.         Judgment: Judgment normal.             Significant Labs: CBC:   Recent Labs   Lab 10/02/24  0230 10/03/24  0304   WBC 13.53* 13.45*   HGB 10.2* 9.2*   HCT 31.7* 27.2*   * 449     CMP:   Recent Labs   Lab 10/02/24  0230 10/03/24  0304     135*   K 4.0 4.1    104   CO2 20* 19*   GLU 92 149*   BUN 15 14   CREATININE 0.9 0.9   CALCIUM 8.0* 7.9*   PROT 5.6* 5.5*   ALBUMIN 1.7* 1.6*   BILITOT 0.5 0.4   ALKPHOS 134 139*   AST 24 28   ALT 7* 10   ANIONGAP 8 12       Significant Imaging: I have reviewed all pertinent imaging results/findings within the past 24 hours.    Assessment/Plan:      * Surgical wound breakdown  Closed trimalleolar fracture of right ankle s/p ORIF in 7/2024  Surgical site infection  63 yo male presenting with confusion and worsening redness, swelling and pain to right ankle. Patient with sepsis criteria on admit and right ankle wound felt to be source of infection.   - Ortho consulted and patient taken to OR 9/06/2024 for debridement of surgical wound with wound vac placed. Cultures taken and grew both MSSA and Group B streptococcus from wound. Patient taken back again to OR with Ortho with Dr. Liz and patient had another I&Dand wound vac change on 9/9/2024. Orthopedics took back to OR again on 9/20/2024 with Dr. Liz and underwent another I&D of right ankle and wound and replacement of wound vac. Patient taken back to OR with OR again on 9/23 with Dr. Liz and had another I&D and wound vac change with Orthopedics. Ortho noted anterolateral foot eschar developing. Distal lateral wound breakdown and ortho opened up majority of lateral wound and excised surrounding skin and subcutaneous tissue. Ortho removed all ankle hardware. Wound vacs placed medially and laterally.   - Orthopedics recommended Plastics evaluation for free flap and as part of evaluation recommended Vascular evaluation. Vascular evaluated patient and angiogram of right leg done and PTA done and revascularization of right PT/AT. Plastics plan propeller flap on 9/25/2024 but unfortunately due to presence of lateral and anterior wounds to right ankle no longer candidate for propeller flap so now will need free flap to cover both wounds. Plastics plans to  take to OR on 9/26 for free flap to right ankle wounds. Plastics reports if unable to do free flap then patient's only option will be a BKA. Patient taken to OR on 9/26 with Plastics and in OR for 8 hours with multiple attempts at targets for free flap at least 3 times but was not successful unfortunately only option is BKA.  - Patient taken to the OR on 10/1/2024 with Dr. Moe Liz and underwent right BKA.   - Wound cultures -- MSSA and Group B strep from right ankle.   - Blood cultures -- MSSA on admit but repeat blood cultures negative.   - ID consulted and following and appreciate recs: Continue IV Oxacillin 12 g every 24 hours continuous infusion  - ID re consulted after right BKA for new recommendations. Final ID recs noted:  Continue IV oxacillin 12 g q 24 hours continuous infusion until 10/20. This will complete 6 weeks of IV antibiotics from first negative blood culture - a sufficient duration to treat endocarditis if present. No need for ROBERTA. This will also complete the 14 days of post-BKA antibiotics recommended by Orthopedics.    Accepting facility to perform weekly labs (CBC,CMP,CRP), and PICC line dressing changes.  Facility to fax results to [facility] Fax Number: [phone].   ID will schedule pt for ID clinic f/u appt after completion of Rehab. If ID appt not seen under patient's appointments tab, please call ID dept to schedule appt before patient discharge.  Order for PICC line placed on 10/3.   - Pain controlled and continue multimodals.  - Continue Apixiban 2.5 mg for BID for DVT prophylaxis.   - Ortho managing BKA site and Prevena wound vac in place and will continue.  - PT/OT consulted after BKA again and recommended high intensity. IP rehab referral sent and patient accepted to [facility] which was his first choice and patient agreeable. Auth sent and obtained and plan discharge for 10/4. Patient aware and agreeable to discharge plan.      MSSA bacteremia  Bacteremia resolved.    - Blood cultures from admit grew MSSA. Repeat blood cultures done on 9/8 and 9/10 no growth.  - Infectious Disease consulted and patient placed on IV Oxacillin infusion as suspected source was right ankle surgical wound infection for bacteremia as grew MSSA from right ankle wound.   - Echo done without concern for vegetations.    Uncontrolled type 2 diabetes mellitus with hyperglycemia  Patient's FSGs controlled. Endocrine consulted and managing patient's diabetes in hospital and appreciate assistance. Patient on insulin pump at [facility] but not in hospital and on basal/prandial insulin in hospital as per Endocrine. Appreciate Endocrine assistance on this case.   Last A1c reviewed-   Lab Results   Component Value Date    LABA1C 10.8 (H) 08/15/2016    HGBA1C 8.5 (H) 09/06/2024     Most recent fingerstick glucose reviewed-   Recent Labs   Lab 10/02/24  1721 10/02/24  2256 10/03/24  0752 10/03/24  1145   POCTGLUCOSE 59* 122* 206* 344*       Current correctional scale  Low  Maintain anti-hyperglycemic dose as follows-   Antihyperglycemics (From admission, onward)      Start     Stop Route Frequency Ordered    10/04/24 0900  insulin glargine U-100 (Lantus) pen 16 Units         -- SubQ Daily 10/03/24 0944    10/03/24 1130  insulin aspart U-100 pen 2-6 Units         -- SubQ 3 times daily with meals 10/03/24 0953    09/27/24 1013  insulin aspart U-100 pen 0-10 Units         -- SubQ Before meals & nightly PRN 09/27/24 0913           - Endocrine consulted:  - At this time, recommend that the patient remain off of his pump since he cannot be controlled in the Auto mode. Patient does not interact with pump frequently and relies on the auto mode algorithm.   - Insulin dosing as per Endocrine recommendations.   - Hold oral antihyperglycemics during hospitalization   - Monitor blood sugars with meals and at bedtime in hospital.  - Target blood sugars 140-180 in hospital.  - Diabetic diet.     Gastroesophageal reflux disease without  esophagitis  Controlled. Continue Carafate every 6 hours po to treat.       Airway malacia  Noted on CT scan of chest. Patient with no acute issues and incidentally noted on CT scan of chest. Patient asymptomatic.       Acute blood loss anemia  - Patient transfused 1 unit of PRBCs on 10/1 pre op for BKA as Hgb was 7.5 and 1 unit also transfused intra-op on 10/1 and Hgb improved to 10.2 on 10/2 and stable at 9.2 on 10/3.   - Patient transfused 2 units of PRBCs on 9/26 during free flap attempts by Plastics.  - No clinical signs of bleeding and will monitor.   - Anemia is likely due to acute blood loss from surgery. Most recent hemoglobin and hematocrit are listed below.  Recent Labs     10/01/24  0236 10/02/24  0230 10/03/24  0304   HGB 7.5* 10.2* 9.2*   HCT 21.8* 31.7* 27.2*       Plan  - Monitor serial CBC: Daily  - Transfuse PRBC if patient becomes hemodynamically unstable, symptomatic or H/H drops below 7/21.  - Patient's anemia is currently stable and monitor. No indication for blood transfusion at this time.     PAD (peripheral artery disease)  - Present on admit. Patient noted to have high grade stenosis on CTA of right leg. Vascular surgery consulted and patient taken to OR and had unilateral angiogram of right leg and had PT/AT artery stenosis on 9/17 and underwent PTA of right PT/AT arteries. Appreciate Vascular surgery assistance on case.   - Patient s/p free flap attempt with Plastics and did attempt donor flap from abdomen/flank area but unsuccessful so Orthopedics ended up doing right BKA as unable to cover wounds to right leg with free flap.   - Continue Aspirin and Lipitor daily to treat PAD.       Thrombocytosis  - Resolved and back to normal range on 10/3.   - Improving with treatment of infection. Present on admit and related to infection causing increase in platelet count.   - Monitor daily CBC.     On pre-exposure prophylaxis for HIV  Patient on Truvada as outpatient but held in hospital due to  "elevated AST and ALT that has resolved. Plan to resume Truvada on discharge.     Class 1 obesity due to excess calories with serious comorbidity and body mass index (BMI) of 34.0 to 34.9 in adult  Body mass index is 34.11 kg/m². Morbid obesity complicates all aspects of disease management from diagnostic modalities to treatment. Weight loss encouraged and health benefits explained to patient.         Pure hypercholesterolemia  Present on admit. Will treat with Lipitor 40 mg po daily.       Closed trimalleolar fracture of right ankle  S/p ORIF 07/2024  Ortho consulted   - see surgical wound breakdown above  NWB  S/p BKA 10/1    Uncontrolled type 2 diabetes mellitus with diabetic polyneuropathy, with long-term current use of insulin  Patient's FSGs are uncontrolled due to hyperglycemia on current medication regimen.  Last A1c reviewed-   Lab Results   Component Value Date    LABA1C 10.8 (H) 08/15/2016    HGBA1C 9.9 (H) 07/18/2024     Most recent fingerstick glucose reviewed- No results for input(s): "POCTGLUCOSE" in the last 24 hours.  Current correctional scale  Low  Maintain anti-hyperglycemic dose as follows-   Antihyperglycemics (From admission, onward)      Start     Stop Route Frequency Ordered    09/06/24 0506  insulin aspart U-100 pen 0-5 Units         -- SubQ Every 6 hours PRN 09/06/24 0406          Hold Oral hypoglycemics while patient is in the hospital.    Anxiety  Chronic condition. Trazodone 50 mg po nightly to help with sleep. Patient still having some sleep disturbances so will increase to 100 mg po nightly on 10/3.       VTE Risk Mitigation (From admission, onward)           Ordered     apixaban tablet 2.5 mg  2 times daily         10/02/24 1050     IP VTE HIGH RISK PATIENT  Once         09/06/24 1735                    Discharge Planning   JAYLEEN: 10/4/2024     Code Status: Full Code   Is the patient medically ready for discharge?:     Reason for patient still in hospital (select all that apply): " Patient new problem and Patient trending condition  Discharge Plan A: Rehab        aRisa Kearns MD  Department of Hospital Medicine   [facility] - Surgery

## 2024-10-03 NOTE — ASSESSMENT & PLAN NOTE
- Blood cultures 9/6 and 9/7 + for MSSA.    - Blood cultures cleared 9/8.    - TTE negative.   - ID recommending continue IV Oxacillin continuous infusion end date 10/20. This will complete 6 weeks of IV antibiotics from first negative blood culture - a sufficient duration to treat endocarditis if present.

## 2024-10-03 NOTE — PT/OT/SLP PROGRESS
Physical Therapy      Patient Name:  Cortes Schroeder   MRN:  [id number]    Patient not seen today secondary to Patient fatigue. Will follow-up per POC.    10/3/2024

## 2024-10-03 NOTE — ASSESSMENT & PLAN NOTE
- Patient transfused 1 unit of PRBCs on 10/1 pre op for BKA as Hgb was 7.5 and 1 unit also transfused intra-op on 10/1 and Hgb improved to 10.2 on 10/2 and stable at 9.2 on 10/3.   - Patient transfused 2 units of PRBCs on 9/26 during free flap attempts by Plastics.  - No clinical signs of bleeding and will monitor.   - Anemia is likely due to acute blood loss from surgery. Most recent hemoglobin and hematocrit are listed below.  Recent Labs     10/01/24  0236 10/02/24  0230 10/03/24  0304   HGB 7.5* 10.2* 9.2*   HCT 21.8* 31.7* 27.2*       Plan  - Monitor serial CBC: Daily  - Transfuse PRBC if patient becomes hemodynamically unstable, symptomatic or H/H drops below 7/21.  - Patient's anemia is currently stable and monitor. No indication for blood transfusion at this time.

## 2024-10-03 NOTE — ASSESSMENT & PLAN NOTE
Chronic condition. Trazodone 50 mg po nightly to help with sleep. Patient still having some sleep disturbances so will increase to 100 mg po nightly on 10/3.

## 2024-10-03 NOTE — SUBJECTIVE & OBJECTIVE
Interval History: Patient states he did not sleep well at all last night and very tired today. Patient states he is trying to eat but just not a fan of our hospital food. Infectious Disease came by yesterday and gave final recs for IV Oxacillin and recommending to continue continuous infusion through 10/20. Order placed for PICC line placement today and patient updated. Patient reports pain at site of right BKA stump controlled and Prevena wound vac in place to right BKA site. Patient has been accepted to [facility] for discharge. Insurance authorization received. Labs reviewed. Hgb 9.2 and slightly down from 10.2 yesterday. No clinical signs of bleeding noted. Blood sugars controlled. Appreciate Endocrine assistance on case.     Review of Systems   Constitutional:  Negative for fever.   Respiratory:  Negative for shortness of breath.    Cardiovascular:  Negative for chest pain.   Gastrointestinal:  Negative for nausea and vomiting.   Musculoskeletal:  Positive for arthralgias (Right BKA stump).   Psychiatric/Behavioral:  Positive for sleep disturbance (Difficulty sleeping). Negative for agitation and confusion.      Objective:     Vital Signs (Most Recent):  Temp: 98 °F (36.7 °C) (10/03/24 1239)  Pulse: 105 (10/03/24 1239)  Resp: 18 (10/03/24 1239)  BP: 127/59 (10/03/24 1239)  SpO2: 92 % (10/03/24 1239) on room air Vital Signs (24h Range):  Temp:  [98 °F (36.7 °C)-98.9 °F (37.2 °C)] 98 °F (36.7 °C)  Pulse:  [] 105  Resp:  [18-20] 18  SpO2:  [92 %-95 %] 92 %  BP: (127-175)/(59-72) 127/59     Weight: 93 kg (204 lb 15.7 oz)  Body mass index is 34.11 kg/m².    Intake/Output Summary (Last 24 hours) at 10/3/2024 1402  Last data filed at 10/3/2024 0601  Gross per 24 hour   Intake 300 ml   Output 1500 ml   Net -1200 ml         Physical Exam  Vitals and nursing note reviewed.   Constitutional:       General: He is awake. He is not in acute distress.     Appearance: Normal appearance. He is well-developed. He is  obese.      Comments: Patient sitting up in bed in no distress and in good spirits and comfortable.    Eyes:      Conjunctiva/sclera: Conjunctivae normal.   Cardiovascular:      Rate and Rhythm: Normal rate and regular rhythm.      Heart sounds: Normal heart sounds. No murmur heard.  Pulmonary:      Effort: Pulmonary effort is normal. No respiratory distress.      Breath sounds: Normal breath sounds. No wheezing.   Abdominal:      General: Abdomen is flat. Bowel sounds are normal. There is no distension.      Palpations: Abdomen is soft.      Tenderness: There is no abdominal tenderness.      Comments: Bandages to left flank, Bandages are clean and dry and intact from donor site.   Musculoskeletal:      Left lower leg: No edema.      Comments: S/p BKA to RLE with Prevena wound vac in place   Skin:     General: Skin is warm.      Findings: No erythema.   Neurological:      Mental Status: He is alert and oriented to person, place, and time.   Psychiatric:         Mood and Affect: Mood normal.         Behavior: Behavior normal. Behavior is cooperative.         Thought Content: Thought content normal.         Judgment: Judgment normal.             Significant Labs: CBC:   Recent Labs   Lab 10/02/24  0230 10/03/24  0304   WBC 13.53* 13.45*   HGB 10.2* 9.2*   HCT 31.7* 27.2*   * 449     CMP:   Recent Labs   Lab 10/02/24  0230 10/03/24  0304    135*   K 4.0 4.1    104   CO2 20* 19*   GLU 92 149*   BUN 15 14   CREATININE 0.9 0.9   CALCIUM 8.0* 7.9*   PROT 5.6* 5.5*   ALBUMIN 1.7* 1.6*   BILITOT 0.5 0.4   ALKPHOS 134 139*   AST 24 28   ALT 7* 10   ANIONGAP 8 12       Significant Imaging: I have reviewed all pertinent imaging results/findings within the past 24 hours.

## 2024-10-03 NOTE — PROGRESS NOTES
[facility] - Surgery  Infectious Disease  Progress Note    Patient Name: Cortes Schroeder  MRN: [id number]  Admission Date: 9/6/2024  Length of Stay: 26 days  Attending Physician: Maria Del Rosario Medina MD  Primary Care Provider: Andrew Sinclair Jr., MD    Isolation Status: No active isolations  Assessment/Plan:        Surgical site infection    64 year old with poorly controlled DM (A1C 8.5), recent fall resulting in left wrist and right ankle fractures 07/18/24 s/p ex fix Rt ankle and ORIF left wrist 07/19, with subsequent ORIF right ankle on 7/26/24 with Dr. Liz,  c/b MSSA deep surgical site infection of right ankle involving underlying hardware with associated MSSA bacteremia,  s/p I&D on 09/06 and 9/9.  Surgical cxs +GBS, MSSA.   On 09/17 underwent angiogram with PTA and wound vac exchange. Further I&D with wound vac exchange 09/20 and 09/23 with removal of all hardware.  Attempted parascapular flap by Plastic Surgery 9/26 with no inflow flap aborted.  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     Blood cultures 9/6 and 9/7 + for MSSA.  Blood cultures cleared 9/8.  TTE negative.      Pt maintained on IV oxacillin continuous infusion .  Afebrile.  Mild post-op leukocytosis.  Plan for discharge to [facility].     Recommendations / Plan:    Continue IV oxacillin 12 g q 24 hours continuous infusion until 10/20.  This will complete 6 weeks of IV antibiotics from first negative blood culture - a sufficient duration to treat endocarditis if present.  No need for ROBERTA.  This will also complete the 14 days of post-BKA antibiotics recommended by Orthopedics.    Recommend placement after hosp d/c for PT/OT, ADL assistance, wound care and IV abx - patient to d/c to [facility]  Accepting facility to perform weekly labs (cbc, cmp, crp), and PICC line dressing changes.  Facility to fax results to [facility] Fax Number: [phone].   ID will schedule pt for ID clinic f/u appt after completion of Rehab.   If ID appt not seen under  patient's appointments tab, please call ID dept to schedule appt before pt discharge.  Will sign off.  Please call with questions or re-consult as needed.      Discussed with ID staff, Dr. Randhawa and Primary Team, Dr. Medina    Thank you.   Please Secure Chat for any questions or concerns.  ISELA Alexis, ANP-C  Infectious Disease     I spent a total of 60 minutes on the day of the visit.This includes face to face time and non-face to face time preparing to see the patient (eg, review of tests), obtaining and/or reviewing separately obtained history, documenting clinical information in the electronic or other health record, independently interpreting results and communicating results to the patient/family/caregiver, or care coordinator.      Subjective:     Principal Problem:Surgical wound breakdown    HPI: Cortes Schroeder is a 64 year old with HTN, poorly controlled DM (A1C 8.5), recent history of syncopal episodes, and  right ankle fracture on 7/18/24 s/p ex fix with subsequent ORIF on 7/26/24 with Dr. Liz.  At outpatient Orthopedic follow up surgical wound was healing and sutures removed.  Since then patient  reportedly doing well until about 1 week ago when he noticed some drainage from his surgical incisions.  Over the past week he developed fevers, chills, and night sweats.  Yesterday he became confused/altered and was brought to ED.  In ED was hypotensive, tachycardic, WBC 18, .  Sepsis protocol initiated and started on clindamycin has been doing well postoperatively until around 1 week ago when he started noticing some drainage from his surgical incisions. He started to develop fevers, chills and night sweats over the past week. Yesterday afternoon, patient became altered and was brought to the ED by his roommate. Patient currently lives in [place]. Upon presentation to the ED, patient was tachycardic, hypotensive and afebrile. WBC of 18.68, .3. Sepsis protocol initiated. Patient was  started on clindamycin and vancomycin.  Now on vancomycin and cefepime.       Orthopedics consulted.  Noted draining wound over the medial side of the right ankle as well as eschar formation over the lateral side of the ankle.  Right ankle and foot noted to be erythematous and edematous.  Pending surgical I&D today.           Interval History:   Right BKA 10/1.    ID re-consulted for antibiotic duration update in light of BKA  Good spirits.  Anxious to get on with rehab      Review of Systems   Constitutional:  Positive for activity change.   Musculoskeletal:  Positive for arthralgias.        Right BKA surgical site pain   All other systems reviewed and are negative.    Objective:     Vital Signs (Most Recent):  Temp: 98.2 °F (36.8 °C) (10/02/24 1906)  Pulse: 99 (10/02/24 1906)  Resp: 18 (10/02/24 1906)  BP: (!) 141/70 (10/02/24 1906)  SpO2: (!) 93 % (10/02/24 1906) Vital Signs (24h Range):  Temp:  [98 °F (36.7 °C)-99.3 °F (37.4 °C)] 98.2 °F (36.8 °C)  Pulse:  [] 99  Resp:  [16-18] 18  SpO2:  [93 %-96 %] 93 %  BP: (126-162)/(59-71) 141/70     Weight: 93 kg (204 lb 15.7 oz)  Body mass index is 34.11 kg/m².    Estimated Creatinine Clearance: 88.1 mL/min (based on SCr of 0.9 mg/dL).     Physical Exam  Vitals and nursing note reviewed.   Constitutional:       General: He is not in acute distress.     Appearance: He is obese. He is not ill-appearing, toxic-appearing or diaphoretic.   HENT:      Head: Normocephalic.      Mouth/Throat:      Mouth: Mucous membranes are moist.   Eyes:      General: No scleral icterus.     Conjunctiva/sclera: Conjunctivae normal.   Cardiovascular:      Rate and Rhythm: Normal rate and regular rhythm.      Heart sounds: No murmur heard.  Pulmonary:      Effort: Pulmonary effort is normal. No respiratory distress.      Breath sounds: Normal breath sounds.   Abdominal:      General: There is no distension.      Palpations: Abdomen is soft.      Tenderness: There is no abdominal tenderness.    Musculoskeletal:         General: Deformity present.      Cervical back: Normal range of motion.      Left lower leg: No edema.      Comments: Right BKA with prevena wound vac in place   Skin:     General: Skin is warm and dry.      Comments: Right sub-scapular graft donor site dressed - c/d/i   Neurological:      Mental Status: He is alert and oriented to person, place, and time.   Psychiatric:         Mood and Affect: Mood normal.         Behavior: Behavior normal.          Significant Labs: Blood Culture:   Recent Labs   Lab 09/06/24  0235 09/07/24  0910 09/08/24  1641 09/10/24  1711   LABBLOO Gram stain aer bottle: Gram positive cocci in clusters resembling Staph  Gram stain alicia bottle: Gram positive cocci in clusters resembling Staph  Results called to and read back by:Luciana Altamirano RN 09/06/2024  20:52  STAPHYLOCOCCUS AUREUS*  Gram stain aer bottle: Gram positive cocci in clusters resembling Staph  Gram stain alicia bottle: Gram positive cocci in clusters resembling Staph  Results called to and read back by:Luciana Altamirano RN 09/06/2024  20:55  STAPHYLOCOCCUS AUREUS  For susceptibility see order #[id number]  * Gram stain aer bottle: Gram positive cocci in clusters resembling Staph  Results called to and read back by: Roseanne Guevara RN 09/08/2024  14:07  Gram stain alicia bottle: Gram positive cocci in clusters resembling Staph  Positive results previously called 09/08/2024  STAPHYLOCOCCUS AUREUS  ID consult required at [facility].  For susceptibility see order #[id number]  *  Gram stain aer bottle: Gram positive cocci in clusters resembling Staph  Gram stain alicia bottle: Gram positive cocci in clusters resembling Staph  Results called to and read back by:Luciana Altamirano LPN 09/08/2024  03:59  STAPHYLOCOCCUS AUREUS  ID consult required at [facility].  For susceptibility see order #[id number]  * No growth after 5 days.  No  growth after 5 days. No growth after 5 days.     CBC:   Recent Labs   Lab 10/01/24  0236 10/02/24  0230   WBC 10.36 13.53*   HGB 7.5* 10.2*   HCT 21.8* 31.7*   * 497*     CMP:   Recent Labs   Lab 10/01/24  0236 10/02/24  0230    137   K 4.1 4.0    109   CO2 20* 20*    92   BUN 13 15   CREATININE 0.8 0.9   CALCIUM 7.7* 8.0*   PROT 5.0* 5.6*   ALBUMIN 1.5* 1.7*   BILITOT 0.2 0.5   ALKPHOS 125 134   AST 12 24   ALT 10 7*   ANIONGAP 8 8     Microbiology Results (last 7 days)       ** No results found for the last 168 hours. **          Wound Culture:   Recent Labs   Lab 09/06/24  1446   LABAERO STAPHYLOCOCCUS AUREUS  Many  *  STREPTOCOCCUS AGALACTIAE (GROUP B)  Many  Beta-hemolytic streptococci are routinely susceptible to   penicillins,cephalosporins and carbapenems.  *       Significant Imaging: I have reviewed all pertinent imaging results/findings within the past 24 hours.

## 2024-10-03 NOTE — PROGRESS NOTES
[facility] - [facility]  Endocrinology  Progress Note    Admit Date: 2024     Reason for Consult: Management of T2DM, Hyperglycemia      Surgical Procedure and Date: S/P irrigation debridement of RLE on 2024    Diabetes diagnosis year:      Home Diabetes Medications:    Humalog via OmniPod insulin pump  Mounjaro 10 mg weekly     Current pump settings:  Basal 12 am to 2.3 and 6 am to 1.55  ICR to 3 at 12 am, 2 at 4 pm  ISF to 1:20   AIT 3 hrs  Target 110-120        Patient had anaphylaxis reaction to SGLT2 inhibitors specifically Invokana     How often checking glucose at home? Dexcom G6   BG readings on regimen: Average 210's per Dexcom  Hypoglycemia on the regimen?  Yes  Missed doses on regimen?  No     Diabetes Complications include:     Hyperglycemia and Diabetic peripheral neuropathy         Complicating diabetes co morbidities:   HLD, HTN, Obesity         HPI: 64 y.o. male presents to the ED w/ complaint of altered mental status. Hx of R ankle fracture with surgery over a month ago. Patient now presents with sepsis 2/2 R ankle infection s/p ORIF on . Started on IV vanc and cefepime. Given IVFs. Blood cultures pending. Brought to OR with ortho on  for irrigation debridement of RLE. Endocrine following for BG and type 2 diabetes.     Lab Results   Component Value Date    LABA1C 10.8 (H) 08/15/2016    HGBA1C 8.5 (H) 2024         Interval HPI:   Overnight events:  No acute events overnight. Patient in room 541/541 A. Blood glucose worsening. BG at, above, and below goal on current insulin regimen (SSI, prandial, and basal insulin ). Steroid use- None. 2 Days Post-Op  Renal function- Normal   Vasopressors-  None     Lab Results   Component Value Date    CREATININE 0.9 10/03/2024       Endocrine will continue to follow and manage insulin orders inpatient.       Diet diabetic 2000 Calories (up to 75 gm per meal)     Eatin%  Nausea: No  Hypoglycemia and intervention: Yes - Evening lows  "on 10/1/2024.  125ml of dextrose 10% bolus administered per orders x1.  Fever: No  TPN and/or TF: No      /68 (BP Location: Left arm)   Pulse 104   Temp 98.4 °F (36.9 °C) (Oral)   Resp 20   Ht 5' 5" (1.651 m)   Wt 93 kg (204 lb 15.7 oz)   SpO2 95%   BMI 34.11 kg/m²     Labs Reviewed and Include    Recent Labs   Lab 10/03/24  0304   *   CALCIUM 7.9*   ALBUMIN 1.6*   PROT 5.5*   *   K 4.1   CO2 19*      BUN 14   CREATININE 0.9   ALKPHOS 139*   ALT 10   AST 28   BILITOT 0.4     Lab Results   Component Value Date    WBC 13.45 (H) 10/03/2024    HGB 9.2 (L) 10/03/2024    HCT 27.2 (L) 10/03/2024    MCV 89 10/03/2024     10/03/2024     No results for input(s): "TSH", "FREET4" in the last 168 hours.  Lab Results   Component Value Date    HGBA1C 8.5 (H) 09/06/2024       Nutritional status:   Body mass index is 34.11 kg/m².  Lab Results   Component Value Date    ALBUMIN 1.6 (L) 10/03/2024    ALBUMIN 1.7 (L) 10/02/2024    ALBUMIN 1.5 (L) 10/01/2024     Lab Results   Component Value Date    PREALBUMIN 3 (L) 09/06/2024    PREALBUMIN 13 (L) 07/18/2024       Estimated Creatinine Clearance: 88.1 mL/min (based on SCr of 0.9 mg/dL).    Accu-Checks  Recent Labs     10/01/24  0734 10/01/24  1132 10/01/24  1840 10/01/24  2038 10/02/24  0758 10/02/24  1150 10/02/24  1228 10/02/24  1721 10/02/24  2256 10/03/24  0752   POCTGLUCOSE 171* 191* 85 111* 105 49* 85 59* 122* 206*       Current Medications and/or Treatments Impacting Glycemic Control  Immunotherapy:    Immunosuppressants       None          Steroids:   Hormones (From admission, onward)      Start     Stop Route Frequency Ordered    10/02/24 2100  melatonin tablet 6 mg         -- Oral Nightly 10/02/24 1101          Pressors:    Autonomic Drugs (From admission, onward)      None          Hyperglycemia/Diabetes Medications:   Antihyperglycemics (From admission, onward)      Start     Stop Route Frequency Ordered    10/01/24 0715  insulin aspart " U-100 pen 3-8 Units         -- SubQ 3 times daily with meals 10/01/24 0639    09/27/24 1013  insulin aspart U-100 pen 0-10 Units         -- SubQ Before meals & nightly PRN 09/27/24 0913    09/27/24 0900  insulin glargine U-100 (Lantus) pen 20 Units         -- SubQ Daily 09/27/24 0752            ASSESSMENT and PLAN    Cardiac/Vascular  Pure hypercholesterolemia  On statin per ADA guidelines       Endocrine  Uncontrolled type 2 diabetes mellitus with hyperglycemia  BG goal 140-180    - Lantus (Insulin Glargine) 16 units daily (20% reduction due to fasting blood glucose below goal.)  - Novolog 2-6 units TIDWM (Administer 2 units if patient eats 25% of meal, 4  units if patient eats 50% of meal, administer 5 units if the patient eats 75% of meal and administer and 6 units if patient eats 100% of meal. Hold if patient eats less than 25% of meal). (Reduction due to prandial blood glucose below goal)  - [facility] SSI (150/25)  - BG checks AC/HS  - Hypoglycemia protocol in place    Hypoglycemic episodes yesterday evening.  Discussed with patient the possibility of returning to home insulin pump.  Will continue to monitor.    ** Please notify Endocrine for any change and/or advance in diet**  ** Please call Endocrine for any BG related issues **    Discharge Planning:   TBD. Please notify endocrinology prior to discharge.        Orthopedic  * Surgical wound breakdown  Optimize BG control to improve wound healing  Managed per primary team               Brandyn Malin, DNP, FNP  Endocrinology  [facility] - Surgery

## 2024-10-03 NOTE — PROCEDURES
"Cortes Schroeder is a 64 y.o. male patient.    Temp: 98 °F (36.7 °C) (10/03/24 1239)  Pulse: 105 (10/03/24 1239)  Resp: 18 (10/03/24 1239)  BP: (!) 127/59 (10/03/24 1239)  SpO2: (!) 92 % (10/03/24 1239)  Weight: 93 kg (204 lb 15.7 oz) (10/02/24 0315)  Height: 5' 5" (165.1 cm) (10/02/24 0315)    PICC  Date/Time: 10/3/2024 4:33 PM  Location procedure was performed: [facility] PICC LINE PLACEMENT  Performed by: Priscilla Grijalva, RN  Assisting provider: Cecilia Davis LPN  Consent Done: Yes  Time out: Immediately prior to procedure a time out was called to verify the correct patient, procedure, equipment, support staff and site/side marked as required  Indications: med administration  Anesthesia: local infiltration  Local anesthetic: lidocaine 1% without epinephrine  Anesthetic Total (mL): 3  Preparation: skin prepped with ChloraPrep  Skin prep agent dried: skin prep agent completely dried prior to procedure  Sterile barriers: all five maximum sterile barriers used - cap, mask, sterile gown, sterile gloves, and large sterile sheet  Hand hygiene: hand hygiene performed prior to central venous catheter insertion  Location details: right basilic  Catheter type: double lumen  Catheter size: 5 Fr  Catheter Length: 37cm    Ultrasound guidance: yes  Vessel Caliber: medium and patent, compressibility normal  Vascular Doppler: not done  Needle advanced into vessel with real time Ultrasound guidance.  Guidewire confirmed in vessel.  Image recorded and saved.  Sterile sheath used.  no esophageal manometryNumber of attempts: 1  Post-procedure: blood return through all ports, sterile dressing applied and chlorhexidine patch  Technical procedures used: 3CG  Specimens: No  Implants: No  Assessment: placement verified by x-ray  Complications: none          Name Cecilia Davis LPN   10/3/2024    "

## 2024-10-03 NOTE — ASSESSMENT & PLAN NOTE
- S/p Right ankle ex-fix 7/18/24 and left distal radius ORIF 7/19/24.  - Subsequent ORIF right ankle on 7/26/24 with Dr. Liz, complicated by MSSA deep surgical site infection of right ankle involving underlying hardware with associated MSSA bacteremia.   - R medial ankle wound dehisced with redness and swelling around it. S/p I&D on 9/6 and 9/9.   - Now s/p right BKA 10/1.

## 2024-10-03 NOTE — SUBJECTIVE & OBJECTIVE
Principal Problem:Surgical wound breakdown    Principal Orthopedic Problem: As above, s/p R BKA 10/1    Interval History: Patient seen and examined at bedside. NAEON. Afebrile, hypertensive overnight.. Patient doing well. No complaints. Pain well controlled. Patient ambulated 5ft then 4ft with RW yesterday with PT. Hgb 9.2. Wound vac with good seal.      Review of patient's allergies indicates:   Allergen Reactions    Invokana [canagliflozin] Anaphylaxis    Percocet [oxycodone-acetaminophen] Nausea Only and Hallucinations    Biaxin [clarithromycin]     Hydrocodone Other (See Comments)     Dizzy/nausea/hallucinations    Sulfa (sulfonamide antibiotics) Nausea Only and Rash       Current Facility-Administered Medications   Medication    0.9%  NaCl infusion (for blood administration)    0.9%  NaCl infusion (for blood administration)    0.9%  NaCl infusion (for blood administration)    0.9%  NaCl infusion (for blood administration)    acetaminophen tablet 1,000 mg    albuterol-ipratropium 2.5 mg-0.5 mg/3 mL nebulizer solution 3 mL    amLODIPine tablet 10 mg    apixaban tablet 2.5 mg    aspirin EC tablet 81 mg    calcium carbonate 200 mg calcium (500 mg) chewable tablet 1,000 mg    celecoxib capsule 200 mg    dextrose 10% bolus 125 mL 125 mL    dextrose 10% bolus 125 mL 125 mL    dextrose 10% bolus 125 mL 125 mL    dextrose 10% bolus 125 mL 125 mL    dextrose 10% bolus 250 mL 250 mL    dextrose 10% bolus 250 mL 250 mL    dextrose 10% bolus 250 mL 250 mL    dextrose 10% bolus 250 mL 250 mL    gabapentin capsule 300 mg    glucagon (human recombinant) injection 1 mg    glucagon (human recombinant) injection 1 mg    glucose chewable tablet 16 g    glucose chewable tablet 24 g    hydrALAZINE tablet 50 mg    hydrALAZINE tablet 50 mg    insulin aspart U-100 pen 0-10 Units    insulin aspart U-100 pen 3-8 Units    insulin glargine U-100 (Lantus) pen 20 Units    LORazepam tablet 0.5 mg    losartan tablet 50 mg    melatonin tablet 6  "mg    methocarbamoL tablet 500 mg    nortriptyline capsule 50 mg    ondansetron disintegrating tablet 8 mg    oxacillin 12 g in  mL CONTINUOUS INFUSION    pantoprazole EC tablet 40 mg    polyethylene glycol packet 17 g    prochlorperazine injection Soln 5 mg    ropivacaine 0.2% Perineural Pump infusion 500 ML    senna tablet 8.6 mg    sodium bicarbonate tablet 650 mg    sucralfate 100 mg/mL suspension 1 g    traZODone tablet 50 mg    vitamin D 1000 units tablet 1,000 Units     Objective:     Vital Signs (Most Recent):  Temp: 98.3 °F (36.8 °C) (10/03/24 0414)  Pulse: 106 (10/03/24 0414)  Resp: 18 (10/03/24 0414)  BP: (!) 175/72 (10/03/24 0414)  SpO2: (!) 92 % (10/03/24 0414) Vital Signs (24h Range):  Temp:  [98.1 °F (36.7 °C)-99.3 °F (37.4 °C)] 98.3 °F (36.8 °C)  Pulse:  [] 106  Resp:  [16-18] 18  SpO2:  [92 %-96 %] 92 %  BP: (140-175)/(65-72) 175/72     Weight: 93 kg (204 lb 15.7 oz)  Height: 5' 5" (165.1 cm)  Body mass index is 34.11 kg/m².      Intake/Output Summary (Last 24 hours) at 10/3/2024 0559  Last data filed at 10/3/2024 0200  Gross per 24 hour   Intake 200 ml   Output 800 ml   Net -600 ml        Ortho/SPM Exam  A&O x 3  Regular Rate  Non-Labored Respirations    RLE:  Prevena with good seal and suction  Able to flex/extend knee with minimal pain  SILT  Compartments soft         Significant Labs: CBC:   Recent Labs   Lab 10/02/24  0230 10/03/24  0304   WBC 13.53* 13.45*   HGB 10.2* 9.2*   HCT 31.7* 27.2*   * 449     CMP:   Recent Labs   Lab 10/02/24  0230 10/03/24  0304    135*   K 4.0 4.1    104   CO2 20* 19*   GLU 92 149*   BUN 15 14   CREATININE 0.9 0.9   CALCIUM 8.0* 7.9*   PROT 5.6* 5.5*   ALBUMIN 1.7* 1.6*   BILITOT 0.5 0.4   ALKPHOS 134 139*   AST 24 28   ALT 7* 10   ANIONGAP 8 12     All pertinent labs within the past 24 hours have been reviewed.    Significant Imaging: I have reviewed and interpreted all pertinent imaging results/findings.  "

## 2024-10-03 NOTE — PLAN OF CARE
Problem: Adult Inpatient Plan of Care  Goal: Absence of Hospital-Acquired Illness or Injury  Outcome: Progressing  Goal: Optimal Comfort and Wellbeing  Outcome: Progressing  Goal: Readiness for Transition of Care  Outcome: Progressing  Goal: Plan of Care Review  Outcome: Progressing  Goal: Patient-Specific Goal (Individualized)  Outcome: Progressing     Problem: Fall Injury Risk  Goal: Absence of Fall and Fall-Related Injury  Outcome: Progressing   Patient AAOx4.VSS. NAD. Blood glucose monitored. Supplemental insulin given. Wound vac in place. Q2 rounding completed. Safety maintained.

## 2024-10-03 NOTE — CONSULTS
D/L PICC placed in right basilic vein, 37 cm in length with 0 cm exposed. Arm circumference 41 cm. Lot#[id number]

## 2024-10-03 NOTE — SUBJECTIVE & OBJECTIVE
"Interval HPI:   Overnight events:  No acute events overnight. Patient in room 541/541 A. Blood glucose worsening. BG at, above, and below goal on current insulin regimen (SSI, prandial, and basal insulin ). Steroid use- None. 2 Days Post-Op  Renal function- Normal   Vasopressors-  None     Lab Results   Component Value Date    CREATININE 0.9 10/03/2024       Endocrine will continue to follow and manage insulin orders inpatient.       Diet diabetic 2000 Calories (up to 75 gm per meal)     Eatin%  Nausea: No  Hypoglycemia and intervention: Yes - Evening lows on 10/1/2024.  125ml of dextrose 10% bolus administered per orders x1.  Fever: No  TPN and/or TF: No      /68 (BP Location: Left arm)   Pulse 104   Temp 98.4 °F (36.9 °C) (Oral)   Resp 20   Ht 5' 5" (1.651 m)   Wt 93 kg (204 lb 15.7 oz)   SpO2 95%   BMI 34.11 kg/m²     Labs Reviewed and Include    Recent Labs   Lab 10/03/24  0304   *   CALCIUM 7.9*   ALBUMIN 1.6*   PROT 5.5*   *   K 4.1   CO2 19*      BUN 14   CREATININE 0.9   ALKPHOS 139*   ALT 10   AST 28   BILITOT 0.4     Lab Results   Component Value Date    WBC 13.45 (H) 10/03/2024    HGB 9.2 (L) 10/03/2024    HCT 27.2 (L) 10/03/2024    MCV 89 10/03/2024     10/03/2024     No results for input(s): "TSH", "FREET4" in the last 168 hours.  Lab Results   Component Value Date    HGBA1C 8.5 (H) 2024       Nutritional status:   Body mass index is 34.11 kg/m².  Lab Results   Component Value Date    ALBUMIN 1.6 (L) 10/03/2024    ALBUMIN 1.7 (L) 10/02/2024    ALBUMIN 1.5 (L) 10/01/2024     Lab Results   Component Value Date    PREALBUMIN 3 (L) 2024    PREALBUMIN 13 (L) 2024       Estimated Creatinine Clearance: 88.1 mL/min (based on SCr of 0.9 mg/dL).    Accu-Checks  Recent Labs     10/01/24  0734 10/01/24  1132 10/01/24  1840 10/01/24  2038 10/02/24  0758 10/02/24  1150 10/02/24  1228 10/02/24  1721 10/02/24  2256 10/03/24  0752   POCTGLUCOSE 171* 191* " 85 111* 105 49* 85 59* 122* 206*       Current Medications and/or Treatments Impacting Glycemic Control  Immunotherapy:    Immunosuppressants       None          Steroids:   Hormones (From admission, onward)      Start     Stop Route Frequency Ordered    10/02/24 2100  melatonin tablet 6 mg         -- Oral Nightly 10/02/24 1101          Pressors:    Autonomic Drugs (From admission, onward)      None          Hyperglycemia/Diabetes Medications:   Antihyperglycemics (From admission, onward)      Start     Stop Route Frequency Ordered    10/01/24 0715  insulin aspart U-100 pen 3-8 Units         -- SubQ 3 times daily with meals 10/01/24 0639    09/27/24 1013  insulin aspart U-100 pen 0-10 Units         -- SubQ Before meals & nightly PRN 09/27/24 0913    09/27/24 0900  insulin glargine U-100 (Lantus) pen 20 Units         -- SubQ Daily 09/27/24 0722

## 2024-10-03 NOTE — PLAN OF CARE
10/03/24 1208   Post-Acute Status   Post-Acute Authorization Placement   Post-Acute Placement Status Pending medical clearance/testing   Discharge Plan   Discharge Plan A Rehab     Pt's authorization approved to admit to [facility], pending medical stability, expected 10/4. SW to follow.     Aida Rosa LMSW  Case Management   [facility]   Ext. [phone]

## 2024-10-03 NOTE — ASSESSMENT & PLAN NOTE
Closed trimalleolar fracture of right ankle s/p ORIF in 7/2024  Surgical site infection  63 yo male presenting with confusion and worsening redness, swelling and pain to right ankle. Patient with sepsis criteria on admit and right ankle wound felt to be source of infection.   - Ortho consulted and patient taken to OR 9/06/2024 for debridement of surgical wound with wound vac placed. Cultures taken and grew both MSSA and Group B streptococcus from wound. Patient taken back again to OR with Ortho with Dr. Liz and patient had another I&Dand wound vac change on 9/9/2024. Orthopedics took back to OR again on 9/20/2024 with Dr. Liz and underwent another I&D of right ankle and wound and replacement of wound vac. Patient taken back to OR with OR again on 9/23 with Dr. Liz and had another I&D and wound vac change with Orthopedics. Ortho noted anterolateral foot eschar developing. Distal lateral wound breakdown and ortho opened up majority of lateral wound and excised surrounding skin and subcutaneous tissue. Ortho removed all ankle hardware. Wound vacs placed medially and laterally.   - Orthopedics recommended Plastics evaluation for free flap and as part of evaluation recommended Vascular evaluation. Vascular evaluated patient and angiogram of right leg done and PTA done and revascularization of right PT/AT. Plastics plan propeller flap on 9/25/2024 but unfortunately due to presence of lateral and anterior wounds to right ankle no longer candidate for propeller flap so now will need free flap to cover both wounds. Plastics plans to take to OR on 9/26 for free flap to right ankle wounds. Plastics reports if unable to do free flap then patient's only option will be a BKA. Patient taken to OR on 9/26 with Plastics and in OR for 8 hours with multiple attempts at targets for free flap at least 3 times but was not successful unfortunately only option is BKA.  - Patient taken to the OR on 10/1/2024 with Dr. Rosa  Agusto and underwent right BKA.   - Wound cultures -- MSSA and Group B strep from right ankle.   - Blood cultures -- MSSA on admit but repeat blood cultures negative.   - ID consulted and following and appreciate recs: Continue IV Oxacillin 12 g every 24 hours continuous infusion  - ID re consulted after right BKA for new recommendations. Final ID recs noted:  Continue IV oxacillin 12 g q 24 hours continuous infusion until 10/20. This will complete 6 weeks of IV antibiotics from first negative blood culture - a sufficient duration to treat endocarditis if present. No need for ROBERTA. This will also complete the 14 days of post-BKA antibiotics recommended by Orthopedics.    Accepting facility to perform weekly labs (CBC,CMP,CRP), and PICC line dressing changes.  Facility to fax results to [facility] Fax Number: [phone].   ID will schedule pt for ID clinic f/u appt after completion of Rehab. If ID appt not seen under patient's appointments tab, please call ID dept to schedule appt before patient discharge.  Order for PICC line placed on 10/3.   - Pain controlled and continue multimodals.  - Continue Apixiban 2.5 mg for BID for DVT prophylaxis.   - Ortho managing BKA site and Prevena wound vac in place and will continue.  - PT/OT consulted after BKA again and recommended high intensity. IP rehab referral sent and patient accepted to [facility] which was his first choice and patient agreeable. Auth sent and obtained and plan discharge for 10/4. Patient aware and agreeable to discharge plan.

## 2024-10-03 NOTE — ASSESSMENT & PLAN NOTE
- Resolved and back to normal range on 10/3.   - Improving with treatment of infection. Present on admit and related to infection causing increase in platelet count.   - Monitor daily CBC.

## 2024-10-03 NOTE — ANESTHESIA POST-OP PAIN MANAGEMENT
Acute Pain Service Progress Note    Cortes Schroeder is a 64 y.o., male, [id number].    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]  Yes    Acute blood loss anemia [D62]  No    PAD (peripheral artery disease) [I73.9]  Yes    Thrombocytosis [D75.839]  Yes    MSSA bacteremia [R78.81, B95.61]  Yes    On pre-exposure prophylaxis for HIV [Z79.899]  Not Applicable    Surgical site infection [T81.49XA]  Yes    Closed trimalleolar fracture of right ankle [S82.851A]  Yes    Uncontrolled type 2 diabetes mellitus with hyperglycemia [E11.65]  Yes    Class 1 obesity due to excess calories with serious comorbidity and body mass index (BMI) of 34.0 to 34.9 in adult [E66.811, E66.09, Z68.34]  Not Applicable    Gastroesophageal reflux disease without esophagitis [K21.9]  Yes    Metabolic acidosis [E87.20]  Yes    Pure hypercholesterolemia [E78.00]  Yes    Anxiety [F41.9]  Yes      Resolved Hospital Problems    Diagnosis Date Resolved POA    Hyperphosphatemia [E83.39] 09/19/2024 No    Bacteriuria [R82.71] 09/18/2024 No    Constipation [K59.00] 09/19/2024 No    Oropharyngeal dysphagia [R13.12] 09/20/2024 No    Sinus tachycardia [R00.0] 09/19/2024 Yes    Leukocytosis [D72.829] 09/18/2024 Yes    Acute metabolic encephalopathy [G93.41] 09/18/2024 No    Acute retention of urine [R33.8] 09/19/2024 No    Cough [R05.9] 09/18/2024 Yes    Transaminitis [R74.01] 09/21/2024 Yes    Sepsis [A41.9] 09/18/2024 Yes    Hyponatremia [E87.1] 09/21/2024 Yes    Hypertension, essential [I10] 09/24/2024 Yes    Hypokalemia [E87.6] 09/23/2024 Yes    Acute renal failure with tubular necrosis [N17.0] 09/21/2024 Yes    Acute hypoxemic respiratory failure [J96.01] 09/21/2024 Yes       Subjective:     General appearance of alert,  oriented, no complaints   Pain with rest: 8    Numbers   Pain with movement: 9    Numbers   Side Effects    1. Pruritis No    2. Nausea No    3. Motor Blockade No, 0=Ability to raise lower extremities off bed    4. Sedation No, 1=awake and alert    Objective:     Catheter site removed with tip intact      Vitals   Vitals:    10/03/24 0812   BP: 137/68   Pulse: 104   Resp: 20   Temp: 36.9 °C (98.4 °F)        Labs    No results displayed because visit has over 200 results.           Meds   Current Facility-Administered Medications   Medication Dose Route Frequency Provider Last Rate Last Admin    0.9%  NaCl infusion (for blood administration)   Intravenous Q24H PRN Destin Enamorado MD        0.9%  NaCl infusion (for blood administration)   Intravenous Q24H PRN Destin Enamorado MD        0.9%  NaCl infusion (for blood administration)   Intravenous Q24H PRN Artur Galvan MD        0.9%  NaCl infusion (for blood administration)   Intravenous Q24H PRN Artur Galvan MD        acetaminophen tablet 1,000 mg  1,000 mg Oral Q6H Devon Garcia DO   1,000 mg at 10/03/24 0602    [START ON 10/4/2024] acetaminophen tablet 1,000 mg  1,000 mg Oral Q8H Mary Davey MD        albuterol-ipratropium 2.5 mg-0.5 mg/3 mL nebulizer solution 3 mL  3 mL Nebulization Q4H PRN Destin Enamorado MD   3 mL at 09/12/24 1621    amLODIPine tablet 10 mg  10 mg Oral Daily Destin Enamorado MD   10 mg at 10/03/24 0816    apixaban tablet 2.5 mg  2.5 mg Oral BID Maria Del Rosario Medina MD   2.5 mg at 10/03/24 0816    aspirin EC tablet 81 mg  81 mg Oral Daily Maria Del Rosario Medina MD   81 mg at 10/03/24 0816    calcium carbonate 200 mg calcium (500 mg) chewable tablet 1,000 mg  1,000 mg Oral TID PRN Destin Enamorado MD   1,000 mg at 09/28/24 0840    celecoxib capsule 200 mg  200 mg Oral Daily Devon Garcia DO   200 mg at 10/03/24 0815    dextrose 10% bolus 125 mL 125 mL  12.5 g Intravenous PRN Destin Enamorado MD        dextrose 10% bolus  125 mL 125 mL  12.5 g Intravenous PRN Destin Enamorado MD        dextrose 10% bolus 125 mL 125 mL  12.5 g Intravenous PRN Destin Enamorado MD        dextrose 10% bolus 125 mL 125 mL  12.5 g Intravenous PRN Bernarda Caro MD   Stopped at 10/02/24 1757    dextrose 10% bolus 250 mL 250 mL  25 g Intravenous PRN Destin Enamorado MD   Stopped at 09/12/24 2204    dextrose 10% bolus 250 mL 250 mL  25 g Intravenous PRN Destin Enamorado MD        dextrose 10% bolus 250 mL 250 mL  25 g Intravenous PRN Destin Enamorado MD        dextrose 10% bolus 250 mL 250 mL  25 g Intravenous PRN Bernarda Caro MD        gabapentin capsule 300 mg  300 mg Oral BID Mary Davey MD   300 mg at 10/03/24 0816    glucagon (human recombinant) injection 1 mg  1 mg Intramuscular PRN Destin Enamorado MD        glucagon (human recombinant) injection 1 mg  1 mg Intramuscular PRN Destin Enamorado MD        glucose chewable tablet 16 g  16 g Oral PRN Destin Enamorado MD   16 g at 09/12/24 0730    glucose chewable tablet 24 g  24 g Oral PRN Destin Enamorado MD        hydrALAZINE tablet 50 mg  50 mg Oral Q8H PRN Maria Del Rosario Medina MD   50 mg at 09/29/24 0834    hydrALAZINE tablet 50 mg  50 mg Oral Q8H Maria Del Rosario Medina MD   50 mg at 10/03/24 0602    insulin aspart U-100 pen 0-10 Units  0-10 Units Subcutaneous QID (AC + HS) PRN Payton Vora PA-C   4 Units at 09/30/24 1153    insulin aspart U-100 pen 3-8 Units  3-8 Units Subcutaneous TIDWM Brandyn Malin, KAREEM, FNP   6 Units at 10/02/24 0839    insulin glargine U-100 (Lantus) pen 20 Units  20 Units Subcutaneous Daily Payton Vora PA-C   20 Units at 10/03/24 0814    LORazepam tablet 0.5 mg  0.5 mg Oral Q6H PRN Maria Del Rosario Medina MD   0.5 mg at 10/01/24 1838    losartan tablet 50 mg  50 mg Oral Daily Destin Enamorado MD   50 mg at 10/03/24 0816    melatonin tablet 6 mg  6 mg Oral Nightly Mary Davey MD   6 mg at 10/02/24 2100    methocarbamoL tablet 500 mg  500  "mg Oral Q6H Devon Garcia DO   500 mg at 10/03/24 0602    [START ON 10/4/2024] methocarbamoL tablet 500 mg  500 mg Oral Q8H Mary Davey MD        nortriptyline capsule 50 mg  50 mg Oral Nightly Destin Enamorado MD   50 mg at 10/02/24 2100    ondansetron disintegrating tablet 8 mg  8 mg Oral Q8H PRN Destin Enamorado MD   8 mg at 09/29/24 0840    oxacillin 12 g in  mL CONTINUOUS INFUSION  12 g Intravenous Q24H Destin Enamorado MD 20.8 mL/hr at 10/02/24 1302 12 g at 10/02/24 1302    pantoprazole EC tablet 40 mg  40 mg Oral Daily Destin Enamorado MD   40 mg at 10/03/24 0816    polyethylene glycol packet 17 g  17 g Oral BID Destin Enamorado MD   17 g at 09/23/24 2134    prochlorperazine injection Soln 5 mg  5 mg Intravenous Q30 Min PRN Destin Enamorado MD        ropivacaine 0.2% Perineural Pump infusion 500 ML   Perineural Continuous PatChiquita matos DO   New Bag at 10/01/24 1835    senna tablet 8.6 mg  8.6 mg Oral BID Destin Enamorado MD   8.6 mg at 09/22/24 2034    sodium bicarbonate tablet 650 mg  650 mg Oral TID Maria Del Rosario Medina MD   650 mg at 10/03/24 0816    sucralfate 100 mg/mL suspension 1 g  1 g Oral Q6H Destin Enamorado MD   1 g at 10/02/24 1713    traZODone tablet 50 mg  50 mg Oral QHS Maria Del Rosario Medina MD   50 mg at 10/02/24 2100    vitamin D 1000 units tablet 1,000 Units  1,000 Units Oral Daily Destin Enamorado MD   1,000 Units at 10/03/24 0815       Assessment:    Cortes Schroeder is a 63 YO male with a PMH significant for T2DM, HLD, HTN that is now s/p right BKA after a failed right ankle ORIF in July. Patient is a very pleasant person and did not appear to be in any acute distress. He reports pain primarily localized to his right leg. He states the PNC demand bolus has not been helping and describes it as "useless". The dressing surrounding the catheter was compromised so the PNC was removed today. Added tramadol for his pain regimen.      Pain control " adequate    Plan:    Tylenol 1000mg Q6H x 8 doses, followed by Q8H  Celebrex 200mg QD  Robaxin 500mg Q6H x 8 doses, followed by Q8H  Neurotin 300mg BID  Nortriptyline 50mg QHS  Trazadone 50mg QHS  Ativan 0.5mg Q6H PRN  Tramadol 50mg Q6H PRN       Patient doing well, continue present treatment.    Thank you for involving us in his care. We will sign off. Please reach out to the Acute Pain Service with any additional questions or concerns.      Mary Davey MD  Acute Pain Service, PGY-1  [facility]  10/03/2024.8:50 AM

## 2024-10-03 NOTE — CONSULTS
[facility] - [facility]  Physical Medicine & Rehab  Consult Note    Patient Name: Cortes Schroeder  MRN: [id number]  Admission Date: 9/6/2024  Hospital Length of Stay: 27 days  Attending Physician: Raisa Kearns MD    Inpatient consult to Physical Medicine & Rehabilitation  Consult performed by: Aubrie Hurt NP  Consult requested by:  Raisa Kearns MD    Collaborating Physician: Imelda Veloz MD  Reason for Consult:  Assess rehabilitation needs       Consults  Subjective:     Principal Problem: Surgical wound breakdown    HPI: Cortes Schroeder is a 64-year-old male with PMHx of type 2 diabetes on insulin pump, hypertension, morbid obesity, chronic pain syndrome, multiple falls. Recent admission after a fall on 7/18/24 and was found to have a right ankle fracture and a distal left radius fracture. S/p Right ankle ex-fix 7/18/24 and left distal radius ORIF 7/19/24. Subsequent ORIF right ankle on 7/26/24 with Dr. Liz, complicated by MSSA deep surgical site infection of right ankle involving underlying hardware with associated MSSA bacteremia. [place] [facility] [place]. Upon arrival, found to have a R medial ankle wound dehisced with redness and swelling around it. S/p I&D on 9/6 and 9/9. Surgical cxs +GBS, MSSA.  On 9/17 underwent angiogram with PTA and wound vac exchange. Further I&D with wound vac exchange 9/20 and 9/23 with removal of all hardware. Attempted parascapular flap by Plastic Surgery 9/26 with no inflow flap aborted. 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 Blood cultures 9/6 and 9/7 + for MSSA.  Blood cultures cleared 9/8.  TTE negative. ID recommending continue IV Oxacillin continuous infusion end date 10/20. This will complete 6 weeks of IV antibiotics from first negative blood culture - a sufficient duration to treat endocarditis if present.     Functional History: Patient lives in a [facility] with 3 steps to enter. Prior to admission, I. DME: none. Also, has a [place] multilevel  home with a roommate.     Hospital Course:   10/2/24: Participated w/ OT. Sit <> Stand Transfer with contact guard assistance and minimum assistance  with  rolling walker. Patient completed Bed <> Chair Transfer using Step Transfer technique with contact guard assistance with rolling walker. LORNE Galindo.     Past Medical History:   Diagnosis Date    Anxiety 12/18/2012    Back pain 12/18/2012    Cataract     Chronic pain syndrome 04/24/2016    Diabetes type 2, controlled 02/20/2016    Diabetic retinopathy     DM (diabetes mellitus) 12/18/2012    DM (diabetes mellitus), type 2, uncontrolled 11/16/2013    Gastroesophageal reflux disease without esophagitis 02/20/2016    Hyperlipidemia 12/18/2012    Insomnia 08/07/2014    Neuropathy 11/16/2013     Past Surgical History:   Procedure Laterality Date    ANGIOGRAPHY OF LOWER EXTREMITY Right 9/17/2024    Procedure: Angiogram Extremity Unilateral;  Surgeon: GINA Morton II, MD;  Location: [address];  Service: Vascular;  Laterality: Right;  Fluro time 19.5 min  mGy 260.49    ANKLE HARDWARE REMOVAL Right 9/23/2024    Procedure: REMOVAL, HARDWARE, ANKLE;  Surgeon: Moe Liz MD;  Location: [address];  Service: Orthopedics;  Laterality: Right;    APPLICATION OF WOUND VACUUM-ASSISTED CLOSURE DEVICE Right 9/6/2024    Procedure: APPLICATION, WOUND VAC;  Surgeon: Moe Liz MD;  Location: [address];  Service: Orthopedics;  Laterality: Right;    APPLICATION, EXTERNAL FIXATION DEVICE, FOR ANKLE FRACTURE Right 7/18/2024    Procedure: APPLICATION, EXTERNAL FIXATION DEVICE, FOR ANKLE FRACTURE;  Surgeon: Moe Liz MD;  Location: [address];  Service: Orthopedics;  Laterality: Right;    ARTHROTOMY OF ANKLE Right 9/9/2024    Procedure: ARTHROTOMY, ANKLE;  Surgeon: Moe Liz MD;  Location: [address];  Service: Orthopedics;  Laterality: Right;    BACK SURGERY  2003    Lumbar Spine    BELOW KNEE AMPUTATION OF LOWER EXTREMITY  Right 10/1/2024    Procedure: AMPUTATION, BELOW KNEE - RIGHT;  Surgeon: Moe Liz MD;  Location: [facility] OR 2ND FLR;  Service: Orthopedics;  Laterality: Right;  with wound vac placement    CATARACT EXTRACTION      CLOSED REDUCTION, FRACTURE, ANKLE, TRIMALLEOLAR Right 7/18/2024    Procedure: CLOSED REDUCTION, FRACTURE, ANKLE, TRIMALLEOLAR;  Surgeon: Moe Liz MD;  [facility];  Service: Orthopedics;  Laterality: Right;    EPIDURAL STEROID INJECTION N/A 1/16/2019    Procedure: Injection, Steroid, Epidural Cervical;  Surgeon: Andrew Sinclair Jr., MD;  Location: [facility] ENDO;  Service: Pain Management;  Laterality: N/A;  Cervical Epidural Steroid Injection C7-T1    17364    Arrive @ 1240    EPIDURAL STEROID INJECTION N/A 2/20/2019    Procedure: Injection, Steroid, Epidural;  Surgeon: Andrew Sinclair Jr., MD;  Location: [facility] ENDO;  Service: Pain Management;  Laterality: N/A;  Lumbar Epidural Steroid Injection L4-5    18020    Arrive @ 1215 (requests latest time)    FIXATION OF SYNDESMOSIS OF ANKLE Right 7/26/2024    Procedure: FIXATION, SYNDESMOSIS, ANKLE;  Surgeon: Moe Liz MD;  [facility];  Service: Orthopedics;  Laterality: Right;    FLAP PROCEDURE Right 9/26/2024    Procedure: CREATION, FREE FLAP;  Surgeon: Juanito Cueto DO;  [facility];  Service: Plastics;  Laterality: Right;  Spy; Def Free Flap; Micro instr., Microscope; 2 bovies, 2 teams    INJECTION, SPINE, LUMBOSACRAL, TRANSFORAMINAL APPROACH Bilateral 6/14/2024    Procedure: Bilateral L5 Transforaminal Epidural Steroid Injections;  Surgeon: Andrew Sinclair Jr., MD;  Location: [facility] PAIN MANAGEMENT;  Service: Pain Management;  Laterality: Bilateral;  @1300(given)  ASA last 6/8  Check BG  MD Sign.    IRRIGATION AND DEBRIDEMENT Right 9/6/2024    Procedure: IRRIGATION AND DEBRIDEMENT;  Surgeon: Moe Liz MD;  [facility];  Service: Orthopedics;  Laterality:  Right;    IRRIGATION AND DEBRIDEMENT Right 9/20/2024    Procedure: IRRIGATION AND DEBRIDEMENT;  Surgeon: Moe Liz MD;  Location: [facility] OR [facility] FLR;  Service: Orthopedics;  Laterality: Right;    IRRIGATION AND DEBRIDEMENT OF LOWER EXTREMITY Right 9/9/2024    Procedure: IRRIGATION AND DEBRIDEMENT, LOWER EXTREMITY - WITH WOUND VAC CHANGE, RIGHT ANKLE;  Surgeon: Moe Liz MD;  Location: [facility] OR [facility] FLR;  Service: Orthopedics;  Laterality: Right;  WITH WOUND VAC CHANGE, RIGHT ANKLE    OPEN REDUCTION AND INTERNAL FIXATION (ORIF) OF FRACTURE OF DISTAL RADIUS Left 7/19/2024    Procedure: ORIF, FRACTURE, RADIUS, DISTAL - LEFT;  Surgeon: Moe Liz MD;  [facility];  Service: Orthopedics;  Laterality: Left;  LEFT, SYNTHES, C ARM    OPEN REDUCTION AND INTERNAL FIXATION (ORIF) OF INJURY OF ANKLE Right 7/26/2024    Procedure: ORIF, ANKLE;  Surgeon: Moe Liz MD;  [facility];  Service: Orthopedics;  Laterality: Right;    REMOVAL OF EXTERNAL FIXATION DEVICE Right 7/26/2024    Procedure: REMOVAL, EXTERNAL FIXATION DEVICE;  Surgeon: Moe Liz MD;  [facility];  Service: Orthopedics;  Laterality: Right;    REPLACEMENT OF WOUND VACUUM-ASSISTED CLOSURE DEVICE Right 9/17/2024    Procedure: REPLACEMENT, WOUND VAC;  Surgeon: Moe Liz MD;  [facility];  Service: Orthopedics;  Laterality: Right;  wound vac dressing exchange    REPLACEMENT OF WOUND VACUUM-ASSISTED CLOSURE DEVICE Right 9/20/2024    Procedure: REPLACEMENT, WOUND VAC;  Surgeon: Moe Liz MD;  [facility];  Service: Orthopedics;  Laterality: Right;    REPLACEMENT OF WOUND VACUUM-ASSISTED CLOSURE DEVICE Right 9/23/2024    Procedure: REPLACEMENT, WOUND VAC; white & black sponge;  Surgeon: Moe Liz MD;  [facility];  Service: Orthopedics;  Laterality: Right;     Review of patient's allergies indicates:   Allergen Reactions     Invokana [canagliflozin] Anaphylaxis    Percocet [oxycodone-acetaminophen] Nausea Only and Hallucinations    Biaxin [clarithromycin]     Hydrocodone Other (See Comments)     Dizzy/nausea/hallucinations    Sulfa (sulfonamide antibiotics) Nausea Only and Rash       Scheduled Medications:    acetaminophen  1,000 mg Oral Q6H    [START ON 10/4/2024] acetaminophen  1,000 mg Oral Q8H    amLODIPine  10 mg Oral Daily    apixaban  2.5 mg Oral BID    aspirin  81 mg Oral Daily    celecoxib  200 mg Oral Daily    gabapentin  300 mg Oral BID    hydrALAZINE  50 mg Oral Q8H    insulin aspart U-100  2-6 Units Subcutaneous TIDWM    [START ON 10/4/2024] insulin glargine U-100  16 Units Subcutaneous Daily    losartan  50 mg Oral Daily    melatonin  6 mg Oral Nightly    methocarbamoL  500 mg Oral Q6H    [START ON 10/4/2024] methocarbamoL  500 mg Oral Q8H    nortriptyline  50 mg Oral Nightly    oxacillin 12 g in  mL CONTINUOUS INFUSION  12 g Intravenous Q24H    pantoprazole  40 mg Oral Daily    polyethylene glycol  17 g Oral BID    senna  8.6 mg Oral BID    sodium bicarbonate  650 mg Oral TID    sucralfate  1 g Oral Q6H    traZODone  50 mg Oral QHS    vitamin D  1,000 Units Oral Daily       PRN Medications:   Current Facility-Administered Medications:     0.9%  NaCl infusion (for blood administration), , Intravenous, Q24H PRN    0.9%  NaCl infusion (for blood administration), , Intravenous, Q24H PRN    0.9%  NaCl infusion (for blood administration), , Intravenous, Q24H PRN    0.9%  NaCl infusion (for blood administration), , Intravenous, Q24H PRN    albuterol-ipratropium, 3 mL, Nebulization, Q4H PRN    calcium carbonate, 1,000 mg, Oral, TID PRN    dextrose 10%, 12.5 g, Intravenous, PRN    dextrose 10%, 12.5 g, Intravenous, PRN    dextrose 10%, 12.5 g, Intravenous, PRN    dextrose 10%, 12.5 g, Intravenous, PRN    dextrose 10%, 25 g, Intravenous, PRN    dextrose 10%, 25 g, Intravenous, PRN    dextrose 10%, 25 g, Intravenous, PRN     dextrose 10%, 25 g, Intravenous, PRN    glucagon (human recombinant), 1 mg, Intramuscular, PRN    glucagon (human recombinant), 1 mg, Intramuscular, PRN    glucose, 16 g, Oral, PRN    glucose, 24 g, Oral, PRN    hydrALAZINE, 50 mg, Oral, Q8H PRN    insulin aspart U-100, 0-10 Units, Subcutaneous, QID (AC + HS) PRN    LORazepam, 0.5 mg, Oral, Q6H PRN    ondansetron, 8 mg, Oral, Q8H PRN    prochlorperazine, 5 mg, Intravenous, Q30 Min PRN    traMADoL, 50 mg, Oral, Q6H PRN    Family History       Problem Relation (Age of Onset)    Alzheimer's disease Father    Cataracts Father    Heart attack Mother    Hypertension Father, Mother    Migraines Mother    Parkinsonism Father          Tobacco Use    Smoking status: Never    Smokeless tobacco: Never   Substance and Sexual Activity    Alcohol use: Yes     Alcohol/week: 1.0 standard drink of alcohol     Types: 1 Glasses of wine per week     Comment: occasionally    Drug use: No    Sexual activity: Not Currently     Partners: Male     Birth control/protection: Condom     Comment: 10/2/17      Review of Systems   Constitutional:  Positive for activity change. Negative for fatigue and fever.   Musculoskeletal:  Positive for gait problem.   Skin:  Positive for wound.   Neurological:  Positive for weakness.   Psychiatric/Behavioral:  Negative for agitation and behavioral problems.      Objective:     Vital Signs (Most Recent):  Temp: 98.4 °F (36.9 °C) (10/03/24 0812)  Pulse: 104 (10/03/24 0812)  Resp: 20 (10/03/24 0812)  BP: 137/68 (10/03/24 0812)  SpO2: 95 % (10/03/24 0812)    Vital Signs (24h Range):  Temp:  [98.1 °F (36.7 °C)-98.9 °F (37.2 °C)] 98.4 °F (36.9 °C)  Pulse:  [] 104  Resp:  [18-20] 20  SpO2:  [92 %-95 %] 95 %  BP: (137-175)/(68-72) 137/68     Body mass index is 34.11 kg/m².     Physical Exam  Vitals and nursing note reviewed.   HENT:      Head: Normocephalic and atraumatic.      Nose: Nose normal.      Mouth/Throat:      Mouth: Mucous membranes are moist.    Eyes:      Extraocular Movements: Extraocular movements intact.      Pupils: Pupils are equal, round, and reactive to light.   Pulmonary:      Effort: Pulmonary effort is normal. No respiratory distress.   Musculoskeletal:         General: Deformity (R BKA) present.      Comments: Wound vac in place   Neurological:      Mental Status: He is alert.      Motor: Weakness present.      Gait: Gait abnormal.   Psychiatric:         Mood and Affect: Mood normal.         Behavior: Behavior normal.     Diagnostic Results:   Labs: Reviewed  ECG: Reviewed  CT: Reviewed    Assessment/Plan:     * Surgical wound breakdown  - S/p I&D on 9/6 and 9/9. Surgical cxs +GBS, MSSA.   - On 9/17 underwent angiogram with PTA and wound vac exchange.   - Further I&D with wound vac exchange 9/20 and 9/23 with removal of all hardware.   - Attempted parascapular flap by Plastic Surgery 9/26 with no inflow flap aborted.     Surgical site infection  - Blood cultures 9/6 and 9/7 + for MSSA.    - Blood cultures cleared 9/8.    - TTE negative.   - ID recommending continue IV Oxacillin continuous infusion end date 10/20. This will complete 6 weeks of IV antibiotics from first negative blood culture - a sufficient duration to treat endocarditis if present.     Closed trimalleolar fracture of right ankle  - S/p Right ankle ex-fix 7/18/24 and left distal radius ORIF 7/19/24.  - Subsequent ORIF right ankle on 7/26/24 with Dr. Liz, complicated by MSSA deep surgical site infection of right ankle involving underlying hardware with associated MSSA bacteremia.   - R medial ankle wound dehisced with redness and swelling around it. S/p I&D on 9/6 and 9/9.   - Now s/p right BKA 10/1.       The PM&R team has reviewed this patient's ongoing medical case including inpatient diagnosis, medical history, clinical examination, labs, vitals, current social and functional history.    We will continue to follow as a rehab candidate.     Thank you for your consult.        Aubrie Hurt NP  Department of Physical Medicine & Rehab  [facility]

## 2024-10-03 NOTE — PLAN OF CARE
Spoke with patient regarding discharge plan.  [facility] acceptance, insurance authorization obtained and medical plan for dc tomorrow to rehab facility.  Patient verbalized agreement with plan and appreciating for hospitals efforts.

## 2024-10-03 NOTE — ASSESSMENT & PLAN NOTE
BG goal 140-180    - Lantus (Insulin Glargine) 16 units daily (20% reduction due to fasting blood glucose below goal.)  - Novolog 2-6 units TIDWM (Administer 2 units if patient eats 25% of meal, 4  units if patient eats 50% of meal, administer 5 units if the patient eats 75% of meal and administer and 6 units if patient eats 100% of meal. Hold if patient eats less than 25% of meal). (Reduction due to prandial blood glucose below goal)  - [facility] SSI (150/25)  - BG checks AC/HS  - Hypoglycemia protocol in place    Hypoglycemic episodes yesterday evening.  Discussed with patient the possibility of returning to home insulin pump.  Will continue to monitor.    ** Please notify Endocrine for any change and/or advance in diet**  ** Please call Endocrine for any BG related issues **    Discharge Planning:   TBD. Please notify endocrinology prior to discharge.

## 2024-10-03 NOTE — PT/OT/SLP PROGRESS
Occupational Therapy      Patient Name:  Cortes Schroeder   MRN:  [id number]    Patient not seen today secondary to Pain, Patient fatigue. Will follow-up when available.    10/3/2024

## 2024-10-03 NOTE — HPI
Cortes Schroeder is a 64-year-old male with PMHx of type 2 diabetes on insulin pump, hypertension, morbid obesity, chronic pain syndrome, multiple falls. Recent admission after a fall on 7/18/24 and was found to have a right ankle fracture and a distal left radius fracture. S/p Right ankle ex-fix 7/18/24 and left distal radius ORIF 7/19/24. Subsequent ORIF right ankle on 7/26/24 with Dr. Liz, complicated by MSSA deep surgical site infection of right ankle involving underlying hardware with associated MSSA bacteremia. Patient presented now to [facility] on 9/6/24 for AMS. Upon arrival, found to have a R medial ankle wound dehisced with redness and swelling around it. S/p I&D on 9/6 and 9/9. Surgical cxs +GBS, MSSA.  On 9/17 underwent angiogram with PTA and wound vac exchange. Further I&D with wound vac exchange 9/20 and 9/23 with removal of all hardware. Attempted parascapular flap by Plastic Surgery 9/26 with no inflow flap aborted. 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 Blood cultures 9/6 and 9/7 + for MSSA.  Blood cultures cleared 9/8.  TTE negative. ID recommending continue IV Oxacillin continuous infusion end date 10/20. This will complete 6 weeks of IV antibiotics from first negative blood culture - a sufficient duration to treat endocarditis if present.     Functional History: Patient lives in a [facility] with 3 steps to enter. Prior to admission, I. DME: none. Also, has a [place] multilevel home with a roommate.

## 2024-10-03 NOTE — SUBJECTIVE & OBJECTIVE
Past Medical History:   Diagnosis Date    Anxiety 12/18/2012    Back pain 12/18/2012    Cataract     Chronic pain syndrome 04/24/2016    Diabetes type 2, controlled 02/20/2016    Diabetic retinopathy     DM (diabetes mellitus) 12/18/2012    DM (diabetes mellitus), type 2, uncontrolled 11/16/2013    Gastroesophageal reflux disease without esophagitis 02/20/2016    Hyperlipidemia 12/18/2012    Insomnia 08/07/2014    Neuropathy 11/16/2013     Past Surgical History:   Procedure Laterality Date    ANGIOGRAPHY OF LOWER EXTREMITY Right 9/17/2024    Procedure: Angiogram Extremity Unilateral;  Surgeon: GINA Morton II, MD;  Location: [facility] OR [address] [place] [address];  Service: Vascular;  Laterality: Right;  Fluro time 19.5 min  mGy 260.49    ANKLE HARDWARE REMOVAL Right 9/23/2024    Procedure: REMOVAL, HARDWARE, ANKLE;  Surgeon: Moe Liz MD;  Location: [facility] OR [address] [place] [address];  Service: Orthopedics;  Laterality: Right;    APPLICATION OF WOUND VACUUM-ASSISTED CLOSURE DEVICE Right 9/6/2024    Procedure: APPLICATION, WOUND VAC;  Surgeon: Moe Liz MD;  Location: [facility] OR [address] [place] [address];  Service: Orthopedics;  Laterality: Right;    APPLICATION, EXTERNAL FIXATION DEVICE, FOR ANKLE FRACTURE Right 7/18/2024    Procedure: APPLICATION, EXTERNAL FIXATION DEVICE, FOR ANKLE FRACTURE;  Surgeon: Moe Liz MD;  Location: [facility] OR [address] [place] [address];  Service: Orthopedics;  Laterality: Right;    ARTHROTOMY OF ANKLE Right 9/9/2024    Procedure: ARTHROTOMY, ANKLE;  Surgeon: Moe Liz MD;  Location: [facility] OR [address] [place] [address];  Service: Orthopedics;  Laterality: Right;    BACK SURGERY  2003    Lumbar Spine    BELOW KNEE AMPUTATION OF LOWER EXTREMITY Right 10/1/2024    Procedure: AMPUTATION, BELOW KNEE - RIGHT;  Surgeon: Moe Liz MD;  Location: [facility] OR [address] [place] [address];  Service: Orthopedics;  Laterality: Right;  with wound vac placement    CATARACT EXTRACTION      CLOSED REDUCTION, FRACTURE, ANKLE, TRIMALLEOLAR Right 7/18/2024    Procedure: CLOSED  REDUCTION, FRACTURE, ANKLE, TRIMALLEOLAR;  Surgeon: Moe Liz MD;  Location: [facility] OR 2ND FLR;  Service: Orthopedics;  Laterality: Right;    EPIDURAL STEROID INJECTION N/A 1/16/2019    Procedure: Injection, Steroid, Epidural Cervical;  Surgeon: Andrew Sinclari Jr., MD;  Location: [facility] ENDO;  Service: Pain Management;  Laterality: N/A;  Cervical Epidural Steroid Injection C7-T1    73216    Arrive @ 1240    EPIDURAL STEROID INJECTION N/A 2/20/2019    Procedure: Injection, Steroid, Epidural;  Surgeon: Andrew Sinclair Jr., MD;  Location: [facility] ENDO;  Service: Pain Management;  Laterality: N/A;  Lumbar Epidural Steroid Injection L4-5    88090    Arrive @ 1215 (requests latest time)    FIXATION OF SYNDESMOSIS OF ANKLE Right 7/26/2024    Procedure: FIXATION, SYNDESMOSIS, ANKLE;  Surgeon: Moe Liz MD;  [facility];  Service: Orthopedics;  Laterality: Right;    FLAP PROCEDURE Right 9/26/2024    Procedure: CREATION, FREE FLAP;  Surgeon: Juanito Cueto DO;  [facility];  Service: Plastics;  Laterality: Right;  Spy; Def Free Flap; Micro instr., Microscope; 2 bovies, 2 teams    INJECTION, SPINE, LUMBOSACRAL, TRANSFORAMINAL APPROACH Bilateral 6/14/2024    Procedure: Bilateral L5 Transforaminal Epidural Steroid Injections;  Surgeon: Andrew Sinclair Jr., MD;  Location: [facility] PAIN MANAGEMENT;  Service: Pain Management;  Laterality: Bilateral;  @1300(given)  ASA last 6/8  Check BG  MD Sign.    IRRIGATION AND DEBRIDEMENT Right 9/6/2024    Procedure: IRRIGATION AND DEBRIDEMENT;  Surgeon: Moe Liz MD;  Location: [facility] OR [facility] FLR;  Service: Orthopedics;  Laterality: Right;    IRRIGATION AND DEBRIDEMENT Right 9/20/2024    Procedure: IRRIGATION AND DEBRIDEMENT;  Surgeon: Moe Liz MD;  Location: [facility] OR 2ND FLR;  Service: Orthopedics;  Laterality: Right;    IRRIGATION AND DEBRIDEMENT OF LOWER EXTREMITY Right 9/9/2024    Procedure: IRRIGATION AND DEBRIDEMENT,  LOWER EXTREMITY - WITH WOUND VAC CHANGE, RIGHT ANKLE;  Surgeon: Moe Liz MD;  Location: [facility] OR 2ND FLR;  Service: Orthopedics;  Laterality: Right;  WITH WOUND VAC CHANGE, RIGHT ANKLE    OPEN REDUCTION AND INTERNAL FIXATION (ORIF) OF FRACTURE OF DISTAL RADIUS Left 7/19/2024    Procedure: ORIF, FRACTURE, RADIUS, DISTAL - LEFT;  Surgeon: Moe Liz MD;  Location: [facility] OR [facility] FLR;  Service: Orthopedics;  Laterality: Left;  LEFT, SYNTHES, C ARM    OPEN REDUCTION AND INTERNAL FIXATION (ORIF) OF INJURY OF ANKLE Right 7/26/2024    Procedure: ORIF, ANKLE;  Surgeon: Moe Liz MD;  [facility];  Service: Orthopedics;  Laterality: Right;    REMOVAL OF EXTERNAL FIXATION DEVICE Right 7/26/2024    Procedure: REMOVAL, EXTERNAL FIXATION DEVICE;  Surgeon: Moe Liz MD;  [facility];  Service: Orthopedics;  Laterality: Right;    REPLACEMENT OF WOUND VACUUM-ASSISTED CLOSURE DEVICE Right 9/17/2024    Procedure: REPLACEMENT, WOUND VAC;  Surgeon: Moe Liz MD;  [facility];  Service: Orthopedics;  Laterality: Right;  wound vac dressing exchange    REPLACEMENT OF WOUND VACUUM-ASSISTED CLOSURE DEVICE Right 9/20/2024    Procedure: REPLACEMENT, WOUND VAC;  Surgeon: Moe Liz MD;  [facility];  Service: Orthopedics;  Laterality: Right;    REPLACEMENT OF WOUND VACUUM-ASSISTED CLOSURE DEVICE Right 9/23/2024    Procedure: REPLACEMENT, WOUND VAC; white & black sponge;  Surgeon: Moe Liz MD;  [facility];  Service: Orthopedics;  Laterality: Right;     Review of patient's allergies indicates:   Allergen Reactions    Invokana [canagliflozin] Anaphylaxis    Percocet [oxycodone-acetaminophen] Nausea Only and Hallucinations    Biaxin [clarithromycin]     Hydrocodone Other (See Comments)     Dizzy/nausea/hallucinations    Sulfa (sulfonamide antibiotics) Nausea Only and Rash       Scheduled Medications:    acetaminophen   1,000 mg Oral Q6H    [START ON 10/4/2024] acetaminophen  1,000 mg Oral Q8H    amLODIPine  10 mg Oral Daily    apixaban  2.5 mg Oral BID    aspirin  81 mg Oral Daily    celecoxib  200 mg Oral Daily    gabapentin  300 mg Oral BID    hydrALAZINE  50 mg Oral Q8H    insulin aspart U-100  2-6 Units Subcutaneous TIDWM    [START ON 10/4/2024] insulin glargine U-100  16 Units Subcutaneous Daily    losartan  50 mg Oral Daily    melatonin  6 mg Oral Nightly    methocarbamoL  500 mg Oral Q6H    [START ON 10/4/2024] methocarbamoL  500 mg Oral Q8H    nortriptyline  50 mg Oral Nightly    oxacillin 12 g in  mL CONTINUOUS INFUSION  12 g Intravenous Q24H    pantoprazole  40 mg Oral Daily    polyethylene glycol  17 g Oral BID    senna  8.6 mg Oral BID    sodium bicarbonate  650 mg Oral TID    sucralfate  1 g Oral Q6H    traZODone  50 mg Oral QHS    vitamin D  1,000 Units Oral Daily       PRN Medications:   Current Facility-Administered Medications:     0.9%  NaCl infusion (for blood administration), , Intravenous, Q24H PRN    0.9%  NaCl infusion (for blood administration), , Intravenous, Q24H PRN    0.9%  NaCl infusion (for blood administration), , Intravenous, Q24H PRN    0.9%  NaCl infusion (for blood administration), , Intravenous, Q24H PRN    albuterol-ipratropium, 3 mL, Nebulization, Q4H PRN    calcium carbonate, 1,000 mg, Oral, TID PRN    dextrose 10%, 12.5 g, Intravenous, PRN    dextrose 10%, 12.5 g, Intravenous, PRN    dextrose 10%, 12.5 g, Intravenous, PRN    dextrose 10%, 12.5 g, Intravenous, PRN    dextrose 10%, 25 g, Intravenous, PRN    dextrose 10%, 25 g, Intravenous, PRN    dextrose 10%, 25 g, Intravenous, PRN    dextrose 10%, 25 g, Intravenous, PRN    glucagon (human recombinant), 1 mg, Intramuscular, PRN    glucagon (human recombinant), 1 mg, Intramuscular, PRN    glucose, 16 g, Oral, PRN    glucose, 24 g, Oral, PRN    hydrALAZINE, 50 mg, Oral, Q8H PRN    insulin aspart U-100, 0-10 Units, Subcutaneous, QID (AC +  HS) PRN    LORazepam, 0.5 mg, Oral, Q6H PRN    ondansetron, 8 mg, Oral, Q8H PRN    prochlorperazine, 5 mg, Intravenous, Q30 Min PRN    traMADoL, 50 mg, Oral, Q6H PRN    Family History       Problem Relation (Age of Onset)    Alzheimer's disease Father    Cataracts Father    Heart attack Mother    Hypertension Father, Mother    Migraines Mother    Parkinsonism Father          Tobacco Use    Smoking status: Never    Smokeless tobacco: Never   Substance and Sexual Activity    Alcohol use: Yes     Alcohol/week: 1.0 standard drink of alcohol     Types: 1 Glasses of wine per week     Comment: occasionally    Drug use: No    Sexual activity: Not Currently     Partners: Male     Birth control/protection: Condom     Comment: 10/2/17      Review of Systems   Constitutional:  Positive for activity change. Negative for fatigue and fever.   Musculoskeletal:  Positive for gait problem.   Skin:  Positive for wound.   Neurological:  Positive for weakness.   Psychiatric/Behavioral:  Negative for agitation and behavioral problems.      Objective:     Vital Signs (Most Recent):  Temp: 98.4 °F (36.9 °C) (10/03/24 0812)  Pulse: 104 (10/03/24 0812)  Resp: 20 (10/03/24 0812)  BP: 137/68 (10/03/24 0812)  SpO2: 95 % (10/03/24 0812)    Vital Signs (24h Range):  Temp:  [98.1 °F (36.7 °C)-98.9 °F (37.2 °C)] 98.4 °F (36.9 °C)  Pulse:  [] 104  Resp:  [18-20] 20  SpO2:  [92 %-95 %] 95 %  BP: (137-175)/(68-72) 137/68     Body mass index is 34.11 kg/m².     Physical Exam  Vitals and nursing note reviewed.   HENT:      Head: Normocephalic and atraumatic.      Nose: Nose normal.      Mouth/Throat:      Mouth: Mucous membranes are moist.   Eyes:      Extraocular Movements: Extraocular movements intact.      Pupils: Pupils are equal, round, and reactive to light.   Pulmonary:      Effort: Pulmonary effort is normal. No respiratory distress.   Musculoskeletal:         General: Deformity (R BKA) present.      Comments: Wound vac in place    Neurological:      Mental Status: He is alert.      Motor: Weakness present.      Gait: Gait abnormal.   Psychiatric:         Mood and Affect: Mood normal.         Behavior: Behavior normal.          NEUROLOGICAL EXAMINATION:     CRANIAL NERVES     CN III, IV, VI   Pupils are equal, round, and reactive to light.      Diagnostic Results: Labs: Reviewed  ECG: Reviewed  CT: Reviewed

## 2024-10-03 NOTE — ASSESSMENT & PLAN NOTE
- S/p I&D on 9/6 and 9/9. Surgical cxs +GBS, MSSA.   - On 9/17 underwent angiogram with PTA and wound vac exchange.   - Further I&D with wound vac exchange 9/20 and 9/23 with removal of all hardware.   - Attempted parascapular flap by Plastic Surgery 9/26 with no inflow flap aborted.

## 2024-10-03 NOTE — PROGRESS NOTES
[facility]  Orthopedics  Progress Note    Patient Name: Cortes Schroeder  MRN: [id number]  Admission Date: 9/6/2024  Hospital Length of Stay: 27 days  Attending Provider: Raisa Kearns MD  Primary Care Provider: Andrew Sinclair Jr., MD  Follow-up For: Procedure(s) (LRB):  AMPUTATION, BELOW KNEE - RIGHT (Right)    Post-Operative Day: 2 Days Post-Op  Subjective:     Principal Problem:Surgical wound breakdown    Principal Orthopedic Problem: As above, s/p R BKA 10/1    Interval History: Patient seen and examined at bedside. NAEON. Afebrile, hypertensive overnight.. Patient doing well. No complaints. Pain well controlled. Patient ambulated 5ft then 4ft with RW yesterday with PT. Hgb 9.2. Wound vac with good seal.      Review of patient's allergies indicates:   Allergen Reactions    Invokana [canagliflozin] Anaphylaxis    Percocet [oxycodone-acetaminophen] Nausea Only and Hallucinations    Biaxin [clarithromycin]     Hydrocodone Other (See Comments)     Dizzy/nausea/hallucinations    Sulfa (sulfonamide antibiotics) Nausea Only and Rash       Current Facility-Administered Medications   Medication    0.9%  NaCl infusion (for blood administration)    0.9%  NaCl infusion (for blood administration)    0.9%  NaCl infusion (for blood administration)    0.9%  NaCl infusion (for blood administration)    acetaminophen tablet 1,000 mg    albuterol-ipratropium 2.5 mg-0.5 mg/3 mL nebulizer solution 3 mL    amLODIPine tablet 10 mg    apixaban tablet 2.5 mg    aspirin EC tablet 81 mg    calcium carbonate 200 mg calcium (500 mg) chewable tablet 1,000 mg    celecoxib capsule 200 mg    dextrose 10% bolus 125 mL 125 mL    dextrose 10% bolus 125 mL 125 mL    dextrose 10% bolus 125 mL 125 mL    dextrose 10% bolus 125 mL 125 mL    dextrose 10% bolus 250 mL 250 mL    dextrose 10% bolus 250 mL 250 mL    dextrose 10% bolus 250 mL 250 mL    dextrose 10% bolus 250 mL 250 mL    gabapentin capsule 300 mg    glucagon (human  "recombinant) injection 1 mg    glucagon (human recombinant) injection 1 mg    glucose chewable tablet 16 g    glucose chewable tablet 24 g    hydrALAZINE tablet 50 mg    hydrALAZINE tablet 50 mg    insulin aspart U-100 pen 0-10 Units    insulin aspart U-100 pen 3-8 Units    insulin glargine U-100 (Lantus) pen 20 Units    LORazepam tablet 0.5 mg    losartan tablet 50 mg    melatonin tablet 6 mg    methocarbamoL tablet 500 mg    nortriptyline capsule 50 mg    ondansetron disintegrating tablet 8 mg    oxacillin 12 g in  mL CONTINUOUS INFUSION    pantoprazole EC tablet 40 mg    polyethylene glycol packet 17 g    prochlorperazine injection Soln 5 mg    ropivacaine 0.2% Perineural Pump infusion 500 ML    senna tablet 8.6 mg    sodium bicarbonate tablet 650 mg    sucralfate 100 mg/mL suspension 1 g    traZODone tablet 50 mg    vitamin D 1000 units tablet 1,000 Units     Objective:     Vital Signs (Most Recent):  Temp: 98.3 °F (36.8 °C) (10/03/24 0414)  Pulse: 106 (10/03/24 0414)  Resp: 18 (10/03/24 0414)  BP: (!) 175/72 (10/03/24 0414)  SpO2: (!) 92 % (10/03/24 0414) Vital Signs (24h Range):  Temp:  [98.1 °F (36.7 °C)-99.3 °F (37.4 °C)] 98.3 °F (36.8 °C)  Pulse:  [] 106  Resp:  [16-18] 18  SpO2:  [92 %-96 %] 92 %  BP: (140-175)/(65-72) 175/72     Weight: 93 kg (204 lb 15.7 oz)  Height: 5' 5" (165.1 cm)  Body mass index is 34.11 kg/m².      Intake/Output Summary (Last 24 hours) at 10/3/2024 0559  Last data filed at 10/3/2024 0200  Gross per 24 hour   Intake 200 ml   Output 800 ml   Net -600 ml        Ortho/SPM Exam  A&O x 3  Regular Rate  Non-Labored Respirations    RLE:  Prevena with good seal and suction  Able to flex/extend knee with minimal pain  SILT  Compartments soft         Significant Labs: CBC:   Recent Labs   Lab 10/02/24  0230 10/03/24  0304   WBC 13.53* 13.45*   HGB 10.2* 9.2*   HCT 31.7* 27.2*   * 449     CMP:   Recent Labs   Lab 10/02/24  0230 10/03/24  0304    135*   K 4.0 4.1   CL " 109 104   CO2 20* 19*   GLU 92 149*   BUN 15 14   CREATININE 0.9 0.9   CALCIUM 8.0* 7.9*   PROT 5.6* 5.5*   ALBUMIN 1.7* 1.6*   BILITOT 0.5 0.4   ALKPHOS 134 139*   AST 24 28   ALT 7* 10   ANIONGAP 8 12     All pertinent labs within the past 24 hours have been reviewed.    Significant Imaging: I have reviewed and interpreted all pertinent imaging results/findings.  Assessment/Plan:     * Surgical wound breakdown  Cortes Schroeder is a 64 y.o. male with PMH of DM, HTN  and right trimalleolar ankle fracture status post ex fix on 07/18 with subsequent definitive fixation on 07/26 admitted with right ankle wound dehiscence in the setting of uncontrolled diabetes.      He is s/p I&D of R ankle 09/06. Repeat I&D R medial ankle 09/09. 9/17 angiography and medial ankle wound vac exchange.   To OR 9/20 and 9/23 for repeat I&D and vac exchange. Hardware removed on 9/23.   Free flap aborted 9/26 due to poor vascular inflow.     S/p R BKA 10/1.    Pain control: multimodal regimen  PT/OT:  NWB RLE   DVT PPx: eliquis 2.5mg BID  Abx:  oxacillin continuous;   ID recs continue oxacillin until 10/20  Cultures:  ankle cx staph +    Dispo: pending pain control and PT          SONA Price MD  Orthopedics  [facility] - Surgery

## 2024-10-03 NOTE — PROGRESS NOTES
"[facility]  Adult Nutrition  Progress Note    SUMMARY     Recommendations  Continue diabetic diet  RD discontinues ONS and Abhijit d/t pt not drinking  Continue vitamin D  Consider adding MVI  RD following    Goals: Meet % een/epn by next RD f/u  Nutrition Goal Status: progressing towards goal  Communication of RD Recs: other (comment) (poc)    Assessment and Plan  Nutrition Problem  Increased Protein needs     Related to (etiology):   Wound healing     Signs and Symptoms (as evidenced by):   Leg wound     Interventions/Recommendations (treatment strategy):  Collaboration of nutritional care with other providers.  Commercial beverage     Nutrition Diagnosis Status:   Continues    Reason for Assessment    Reason For Assessment: RD follow-up  General Information Comments: RD f/u, unable to speak to pt yesterday d/t CESAR for BKA. Pt endorses improving appetite, 75% intake of lunch and still eating while RD in room (baked beans, bbq chicken). Denies N/V/D/C. States he's not drinking in ONS or abhijit d/t dislikes taste. RD agreed to discontinues but discussed wound healing needed. Appears nourished. #. 2 meals/day at home w/ adequate food at home. RD following  Nutrition Discharge Planning: diabetic, high protein diet    Nutrition Risk Screen    Nutrition Risk Screen: no indicators present    Nutrition/Diet History    Spiritual, Cultural Beliefs, Jew Practices, Values that Affect Care: no  Food Allergies: NKFA    Anthropometrics    Temp: 98.2 °F (36.8 °C)  Height Method: Stated  Height: 5' 5" (165.1 cm)  Height (inches): 65 in  Weight Method: Bed Scale  Weight: 93 kg (204 lb 15.7 oz)  Weight (lb): 204.99 lb  Ideal Body Weight (IBW), Male: 136 lb  % Ideal Body Weight, Male (lb): 150.73 %  BMI (Calculated): 34.1  BMI Grade: 30 - 34.9- obesity - grade I       Lab/Procedures/Meds    Pertinent Labs Reviewed: reviewed  Pertinent Labs Comments: H/H: 10.2/31.7, ALT: 7  Pertinent Medications Reviewed: " reviewed  Pertinent Medications Comments: Insulin, abx, pantoprazole, polyethylene glycol, senna, sodium bicarb, vitamin D      Estimated/Assessed Needs    Weight Used For Calorie Calculations: 93 kg (205 lb 0.4 oz)  Energy Calorie Requirements (kcal): 1651 (msj)  Energy Need Method: Wallace-St Jeor  Protein Requirements: 139-186 (1.5-2 g/kg)  Weight Used For Protein Calculations: 93 kg (205 lb 0.4 oz)     Estimated Fluid Requirement Method: RDA Method  RDA Method (mL): 1651         Nutrition Prescription Ordered    Current Diet Order: Diabetic  Oral Nutrition Supplement: Boost Breeze TID, Boost Glucose Contol TID, Abhijit BID    Evaluation of Received Nutrient/Fluid Intake    I/O: -6.6 L since 9/18  Comments: LBM: 10/1  Tolerance: tolerating  % Intake of Estimated Energy Needs: 75 - 100 %  % Meal Intake: 75 - 100 % (today)    Nutrition Risk    Level of Risk/Frequency of Follow-up: low - moderate (f/u 1x/week)     Monitor and Evaluation    Food and Nutrient Intake: energy intake, food and beverage intake  Food and Nutrient Adminstration: diet order  Knowledge/Beliefs/Attitudes: food and nutrition knowledge/skill, beliefs and attitudes  Physical Activity and Function: nutrition-related ADLs and IADLs  Anthropometric Measurements: height/length, weight change, weight, body mass index  Biochemical Data, Medical Tests and Procedures: electrolyte and renal panel, glucose/endocrine profile, gastrointestinal profile, inflammatory profile, lipid profile  Nutrition-Focused Physical Findings: overall appearance     Nutrition Follow-Up    RD Follow-up?: Yes    Pamella Castrejon RD, LDN

## 2024-10-03 NOTE — ASSESSMENT & PLAN NOTE
Resolved. Discontinue sodium bicarbonate tablets on 9/22.   Related to DEEPALI. Patient started on sodium bicarbonate tablets and will continue. Monitor daily HCO3 levels with labs.   Lactate never drawn but improved to 18 now. Will titrate down na bicarb pending trends 9/28  Bicarb currently 20 on na bicarb so keeping on for now 10/2 and can titrate down clsoer to dc

## 2024-10-03 NOTE — PLAN OF CARE
Recommendations  Continue diabetic diet  RD discontinues ONS and Abhijit d/t pt not drinking  Continue vitamin D  Consider adding MVI  RD following    Goals: Meet % een/epn by next RD f/u  Nutrition Goal Status: progressing towards goal  Communication of RD Recs: other (comment) (poc)

## 2024-10-03 NOTE — SUBJECTIVE & OBJECTIVE
Interval History:   Right BKA 10/1.    ID re-consulted for antibiotic duration update in light of BKA  Good spirits.  Anxious to get on with rehab      Review of Systems   Constitutional:  Positive for activity change.   Musculoskeletal:  Positive for arthralgias.        Right BKA surgical site pain   All other systems reviewed and are negative.    Objective:     Vital Signs (Most Recent):  Temp: 98.2 °F (36.8 °C) (10/02/24 1906)  Pulse: 99 (10/02/24 1906)  Resp: 18 (10/02/24 1906)  BP: (!) 141/70 (10/02/24 1906)  SpO2: (!) 93 % (10/02/24 1906) Vital Signs (24h Range):  Temp:  [98 °F (36.7 °C)-99.3 °F (37.4 °C)] 98.2 °F (36.8 °C)  Pulse:  [] 99  Resp:  [16-18] 18  SpO2:  [93 %-96 %] 93 %  BP: (126-162)/(59-71) 141/70     Weight: 93 kg (204 lb 15.7 oz)  Body mass index is 34.11 kg/m².    Estimated Creatinine Clearance: 88.1 mL/min (based on SCr of 0.9 mg/dL).     Physical Exam  Vitals and nursing note reviewed.   Constitutional:       General: He is not in acute distress.     Appearance: He is obese. He is not ill-appearing, toxic-appearing or diaphoretic.   HENT:      Head: Normocephalic.      Mouth/Throat:      Mouth: Mucous membranes are moist.   Eyes:      General: No scleral icterus.     Conjunctiva/sclera: Conjunctivae normal.   Cardiovascular:      Rate and Rhythm: Normal rate and regular rhythm.      Heart sounds: No murmur heard.  Pulmonary:      Effort: Pulmonary effort is normal. No respiratory distress.      Breath sounds: Normal breath sounds.   Abdominal:      General: There is no distension.      Palpations: Abdomen is soft.      Tenderness: There is no abdominal tenderness.   Musculoskeletal:         General: Deformity present.      Cervical back: Normal range of motion.      Left lower leg: No edema.      Comments: Right BKA with prevena wound vac in place   Skin:     General: Skin is warm and dry.      Comments: Right sub-scapular graft donor site dressed - c/d/i   Neurological:      Mental  Status: He is alert and oriented to person, place, and time.   Psychiatric:         Mood and Affect: Mood normal.         Behavior: Behavior normal.          Significant Labs: Blood Culture:   Recent Labs   Lab 09/06/24  0235 09/07/24  0910 09/08/24  1641 09/10/24  1711   LABBLOO Gram stain aer bottle: Gram positive cocci in clusters resembling Staph  Gram stain alicia bottle: Gram positive cocci in clusters resembling Staph  Results called to and read back by:Luciana Altamirano RN 09/06/2024  20:52  STAPHYLOCOCCUS AUREUS*  Gram stain aer bottle: Gram positive cocci in clusters resembling Staph  Gram stain alicia bottle: Gram positive cocci in clusters resembling Staph  Results called to and read back by:Luciana Altamirano RN 09/06/2024  20:55  STAPHYLOCOCCUS AUREUS  For susceptibility see order #[id number]  * Gram stain aer bottle: Gram positive cocci in clusters resembling Staph  Results called to and read back by: Roseanne Guevara RN 09/08/2024  14:07  Gram stain alicia bottle: Gram positive cocci in clusters resembling Staph  Positive results previously called 09/08/2024  STAPHYLOCOCCUS AUREUS  ID consult required at [facility].  For susceptibility see order #[id number]  *  Gram stain aer bottle: Gram positive cocci in clusters resembling Staph  Gram stain alicia bottle: Gram positive cocci in clusters resembling Staph  Results called to and read back by:Luciana Altamirano LPN 09/08/2024  03:59  STAPHYLOCOCCUS AUREUS  ID consult required at [facility].  For susceptibility see order #[id number]  * No growth after 5 days.  No growth after 5 days. No growth after 5 days.     CBC:   Recent Labs   Lab 10/01/24  0236 10/02/24  0230   WBC 10.36 13.53*   HGB 7.5* 10.2*   HCT 21.8* 31.7*   * 497*     CMP:   Recent Labs   Lab 10/01/24  0236 10/02/24  0230    137   K 4.1 4.0    109   CO2 20* 20*    92   BUN 13 15   CREATININE 0.8 0.9    CALCIUM 7.7* 8.0*   PROT 5.0* 5.6*   ALBUMIN 1.5* 1.7*   BILITOT 0.2 0.5   ALKPHOS 125 134   AST 12 24   ALT 10 7*   ANIONGAP 8 8     Microbiology Results (last 7 days)       ** No results found for the last 168 hours. **          Wound Culture:   Recent Labs   Lab 09/06/24  1446   LABAERO STAPHYLOCOCCUS AUREUS  Many  *  STREPTOCOCCUS AGALACTIAE (GROUP B)  Many  Beta-hemolytic streptococci are routinely susceptible to   penicillins,cephalosporins and carbapenems.  *       Significant Imaging: I have reviewed all pertinent imaging results/findings within the past 24 hours.

## 2024-10-03 NOTE — ASSESSMENT & PLAN NOTE
Cortes Schroeder is a 64 y.o. male with PMH of DM, HTN  and right trimalleolar ankle fracture status post ex fix on 07/18 with subsequent definitive fixation on 07/26 admitted with right ankle wound dehiscence in the setting of uncontrolled diabetes.      He is s/p I&D of R ankle 09/06. Repeat I&D R medial ankle 09/09. 9/17 angiography and medial ankle wound vac exchange.   To OR 9/20 and 9/23 for repeat I&D and vac exchange. Hardware removed on 9/23.   Free flap aborted 9/26 due to poor vascular inflow.     S/p R BKA 10/1.    Pain control: multimodal regimen  PT/OT:  NWB RLE   DVT PPx: eliquis 2.5mg BID  Abx:  oxacillin continuous;   ID recs continue oxacillin until 10/20  Cultures:  ankle cx staph +    Dispo: pending pain control and PT

## 2024-10-04 ENCOUNTER — PATIENT MESSAGE (OUTPATIENT)
Dept: ENDOCRINOLOGY | Facility: HOSPITAL | Age: 64
End: 2024-10-04
Payer: COMMERCIAL

## 2024-10-04 VITALS
HEIGHT: 65 IN | WEIGHT: 205 LBS | HEART RATE: 94 BPM | TEMPERATURE: 99 F | BODY MASS INDEX: 34.16 KG/M2 | OXYGEN SATURATION: 94 % | DIASTOLIC BLOOD PRESSURE: 58 MMHG | SYSTOLIC BLOOD PRESSURE: 118 MMHG | RESPIRATION RATE: 18 BRPM

## 2024-10-04 PROBLEM — B95.61 MSSA BACTEREMIA: Status: RESOLVED | Noted: 2024-09-07 | Resolved: 2024-10-04

## 2024-10-04 PROBLEM — R78.81 MSSA BACTEREMIA: Status: RESOLVED | Noted: 2024-09-07 | Resolved: 2024-10-04

## 2024-10-04 LAB
ALBUMIN SERPL BCP-MCNC: 1.4 G/DL (ref 3.5–5.2)
ALP SERPL-CCNC: 121 U/L (ref 55–135)
ALT SERPL W/O P-5'-P-CCNC: 11 U/L (ref 10–44)
ANION GAP SERPL CALC-SCNC: 7 MMOL/L (ref 8–16)
AST SERPL-CCNC: 15 U/L (ref 10–40)
BILIRUB SERPL-MCNC: 0.3 MG/DL (ref 0.1–1)
BUN SERPL-MCNC: 19 MG/DL (ref 8–23)
CALCIUM SERPL-MCNC: 7.7 MG/DL (ref 8.7–10.5)
CHLORIDE SERPL-SCNC: 109 MMOL/L (ref 95–110)
CO2 SERPL-SCNC: 20 MMOL/L (ref 23–29)
CREAT SERPL-MCNC: 1.1 MG/DL (ref 0.5–1.4)
ERYTHROCYTE [DISTWIDTH] IN BLOOD BY AUTOMATED COUNT: 18.2 % (ref 11.5–14.5)
EST. GFR  (NO RACE VARIABLE): >60 ML/MIN/1.73 M^2
FINAL PATHOLOGIC DIAGNOSIS: NORMAL
GLUCOSE SERPL-MCNC: 230 MG/DL (ref 70–110)
GROSS: NORMAL
HCT VFR BLD AUTO: 24.4 % (ref 40–54)
HGB BLD-MCNC: 8.3 G/DL (ref 14–18)
Lab: NORMAL
MCH RBC QN AUTO: 30 PG (ref 27–31)
MCHC RBC AUTO-ENTMCNC: 34 G/DL (ref 32–36)
MCV RBC AUTO: 88 FL (ref 82–98)
PLATELET # BLD AUTO: 483 K/UL (ref 150–450)
PMV BLD AUTO: 9.6 FL (ref 9.2–12.9)
POCT GLUCOSE: 265 MG/DL (ref 70–110)
POCT GLUCOSE: 268 MG/DL (ref 70–110)
POTASSIUM SERPL-SCNC: 3.9 MMOL/L (ref 3.5–5.1)
PROT SERPL-MCNC: 5.1 G/DL (ref 6–8.4)
RBC # BLD AUTO: 2.77 M/UL (ref 4.6–6.2)
SODIUM SERPL-SCNC: 136 MMOL/L (ref 136–145)
WBC # BLD AUTO: 10.09 K/UL (ref 3.9–12.7)

## 2024-10-04 PROCEDURE — 80053 COMPREHEN METABOLIC PANEL: CPT | Performed by: SURGERY

## 2024-10-04 PROCEDURE — 25000003 PHARM REV CODE 250: Performed by: SURGERY

## 2024-10-04 PROCEDURE — 25000003 PHARM REV CODE 250: Performed by: HOSPITALIST

## 2024-10-04 PROCEDURE — 25000003 PHARM REV CODE 250: Performed by: INTERNAL MEDICINE

## 2024-10-04 PROCEDURE — 85027 COMPLETE CBC AUTOMATED: CPT | Performed by: SURGERY

## 2024-10-04 PROCEDURE — 99232 SBSQ HOSP IP/OBS MODERATE 35: CPT | Mod: ,,, | Performed by: NURSE PRACTITIONER

## 2024-10-04 PROCEDURE — A4216 STERILE WATER/SALINE, 10 ML: HCPCS | Performed by: INTERNAL MEDICINE

## 2024-10-04 PROCEDURE — 99222 1ST HOSP IP/OBS MODERATE 55: CPT | Mod: ,,, | Performed by: PHYSICAL MEDICINE & REHABILITATION

## 2024-10-04 PROCEDURE — 97116 GAIT TRAINING THERAPY: CPT

## 2024-10-04 PROCEDURE — 25000003 PHARM REV CODE 250

## 2024-10-04 RX ORDER — ASPIRIN 81 MG/1
81 TABLET ORAL DAILY
Start: 2024-10-04 | End: 2025-10-04

## 2024-10-04 RX ORDER — CHOLECALCIFEROL (VITAMIN D3) 25 MCG
1000 TABLET ORAL DAILY
Start: 2024-10-04

## 2024-10-04 RX ORDER — TRAMADOL HYDROCHLORIDE 50 MG/1
50 TABLET ORAL EVERY 6 HOURS PRN
Start: 2024-10-04

## 2024-10-04 RX ORDER — HYDRALAZINE HYDROCHLORIDE 50 MG/1
50 TABLET, FILM COATED ORAL EVERY 8 HOURS
Status: ON HOLD
Start: 2024-10-04 | End: 2024-10-29

## 2024-10-04 RX ORDER — METHOCARBAMOL 500 MG/1
500 TABLET, FILM COATED ORAL EVERY 8 HOURS
Start: 2024-10-04

## 2024-10-04 RX ORDER — FUROSEMIDE 40 MG/1
40 TABLET ORAL DAILY
Start: 2024-10-05

## 2024-10-04 RX ORDER — TRAZODONE HYDROCHLORIDE 100 MG/1
100 TABLET ORAL NIGHTLY
Qty: 30 TABLET | Refills: 11 | Status: SHIPPED | OUTPATIENT
Start: 2024-10-04 | End: 2025-10-04

## 2024-10-04 RX ORDER — LOSARTAN POTASSIUM 25 MG/1
50 TABLET ORAL DAILY
Start: 2024-10-04

## 2024-10-04 RX ORDER — CALCIUM CARBONATE 200(500)MG
1000 TABLET,CHEWABLE ORAL 3 TIMES DAILY PRN
COMMUNITY
Start: 2024-10-04 | End: 2025-10-04

## 2024-10-04 RX ORDER — SENNOSIDES 8.6 MG/1
1 TABLET ORAL 2 TIMES DAILY
Status: ON HOLD | COMMUNITY
Start: 2024-10-04 | End: 2024-10-29 | Stop reason: SDUPTHER

## 2024-10-04 RX ORDER — GABAPENTIN 300 MG/1
300 CAPSULE ORAL 2 TIMES DAILY
Start: 2024-10-04

## 2024-10-04 RX ORDER — MORPHINE SULFATE 15 MG/1
15 TABLET ORAL EVERY 4 HOURS PRN
Status: ON HOLD
Start: 2024-10-04 | End: 2024-10-29

## 2024-10-04 RX ORDER — PANTOPRAZOLE SODIUM 40 MG/1
40 TABLET, DELAYED RELEASE ORAL DAILY
Qty: 90 TABLET | Refills: 3 | Status: SHIPPED | OUTPATIENT
Start: 2024-10-05 | End: 2025-10-05

## 2024-10-04 RX ORDER — ACETAMINOPHEN 500 MG
1000 TABLET ORAL EVERY 8 HOURS
COMMUNITY
Start: 2024-10-04

## 2024-10-04 RX ORDER — FUROSEMIDE 40 MG/1
40 TABLET ORAL DAILY
Status: DISCONTINUED | OUTPATIENT
Start: 2024-10-04 | End: 2024-10-04 | Stop reason: HOSPADM

## 2024-10-04 RX ORDER — AMLODIPINE BESYLATE 10 MG/1
10 TABLET ORAL DAILY
COMMUNITY
Start: 2024-10-05

## 2024-10-04 RX ORDER — SUCRALFATE 1 G/10ML
1 SUSPENSION ORAL EVERY 6 HOURS
Start: 2024-10-04

## 2024-10-04 RX ADMIN — INSULIN ASPART 4 UNITS: 100 INJECTION, SOLUTION INTRAVENOUS; SUBCUTANEOUS at 11:10

## 2024-10-04 RX ADMIN — INSULIN ASPART 4 UNITS: 100 INJECTION, SOLUTION INTRAVENOUS; SUBCUTANEOUS at 08:10

## 2024-10-04 RX ADMIN — CHOLECALCIFEROL TAB 25 MCG (1000 UNIT) 1000 UNITS: 25 TAB at 08:10

## 2024-10-04 RX ADMIN — SUCRALFATE 1 G: 1 SUSPENSION ORAL at 11:10

## 2024-10-04 RX ADMIN — ACETAMINOPHEN 1000 MG: 500 TABLET ORAL at 01:10

## 2024-10-04 RX ADMIN — TRAMADOL HYDROCHLORIDE 50 MG: 50 TABLET, COATED ORAL at 06:10

## 2024-10-04 RX ADMIN — METHOCARBAMOL 500 MG: 500 TABLET ORAL at 05:10

## 2024-10-04 RX ADMIN — AMLODIPINE BESYLATE 10 MG: 10 TABLET ORAL at 08:10

## 2024-10-04 RX ADMIN — GABAPENTIN 300 MG: 300 CAPSULE ORAL at 08:10

## 2024-10-04 RX ADMIN — INSULIN GLARGINE 16 UNITS: 100 INJECTION, SOLUTION SUBCUTANEOUS at 08:10

## 2024-10-04 RX ADMIN — PANTOPRAZOLE SODIUM 40 MG: 40 TABLET, DELAYED RELEASE ORAL at 08:10

## 2024-10-04 RX ADMIN — ASPIRIN 81 MG: 81 TABLET, COATED ORAL at 08:10

## 2024-10-04 RX ADMIN — FUROSEMIDE 40 MG: 40 TABLET ORAL at 10:10

## 2024-10-04 RX ADMIN — ACETAMINOPHEN 1000 MG: 500 TABLET ORAL at 05:10

## 2024-10-04 RX ADMIN — CELECOXIB 200 MG: 200 CAPSULE ORAL at 08:10

## 2024-10-04 RX ADMIN — INSULIN ASPART 6 UNITS: 100 INJECTION, SOLUTION INTRAVENOUS; SUBCUTANEOUS at 08:10

## 2024-10-04 RX ADMIN — METHOCARBAMOL 500 MG: 500 TABLET ORAL at 01:10

## 2024-10-04 RX ADMIN — HYDRALAZINE HYDROCHLORIDE 50 MG: 50 TABLET ORAL at 05:10

## 2024-10-04 RX ADMIN — LOSARTAN POTASSIUM 50 MG: 25 TABLET, FILM COATED ORAL at 08:10

## 2024-10-04 RX ADMIN — INSULIN ASPART 6 UNITS: 100 INJECTION, SOLUTION INTRAVENOUS; SUBCUTANEOUS at 11:10

## 2024-10-04 RX ADMIN — Medication 10 ML: at 11:10

## 2024-10-04 RX ADMIN — APIXABAN 2.5 MG: 2.5 TABLET, FILM COATED ORAL at 08:10

## 2024-10-04 NOTE — PLAN OF CARE
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]  Yes     Priority: 10     Acute blood loss anemia [D62]  No     Priority: 11     PAD (peripheral artery disease) [I73.9]  Yes     Priority: 12     Thrombocytosis [D75.839]  Yes     Priority: 14     On pre-exposure prophylaxis for HIV [Z79.899]  Not Applicable     Priority: 15     Class 1 obesity due to excess calories with serious comorbidity and body mass index (BMI) of 34.0 to 34.9 in adult [E66.811, E66.09, Z68.34]  Not Applicable     Priority: 16     Pure hypercholesterolemia [E78.00]  Yes     Priority: 17     Surgical site infection [T81.49XA]  Yes    Closed trimalleolar fracture of right ankle [S82.851A]  Yes    Anxiety [F41.9]  Yes      Resolved Hospital Problems    Diagnosis Date Resolved POA    MSSA bacteremia [R78.81, B95.61] 10/04/2024 Yes     Priority: 2     Acute renal failure with tubular necrosis [N17.0] 09/21/2024 Yes     Priority: 2     Hypokalemia [E87.6] 09/23/2024 Yes     Priority: 3     Hyperphosphatemia [E83.39] 09/19/2024 No     Priority: 4     Oropharyngeal dysphagia [R13.12] 09/20/2024 No     Priority: 4     Constipation [K59.00] 09/19/2024 No     Priority: 5     Acute retention of urine [R33.8] 09/19/2024 No     Priority: 6     Metabolic acidosis [E87.20] 10/03/2024 Yes     Priority: 6     Acute hypoxemic respiratory failure [J96.01] 09/21/2024 Yes     Priority: 7     Hyponatremia  [E87.1] 09/21/2024 Yes     Priority: 8     Transaminitis [R74.01] 09/21/2024 Yes     Priority: 13     Sinus tachycardia [R00.0] 09/19/2024 Yes     Priority: 14     Bacteriuria [R82.71] 09/18/2024 No    Leukocytosis [D72.829] 09/18/2024 Yes    Acute metabolic encephalopathy [G93.41] 09/18/2024 No    Cough [R05.9] 09/18/2024 Yes    Sepsis [A41.9] 09/18/2024 Yes    Hypertension, essential [I10] 09/24/2024 Yes       Vital Signs: Routine.    Allergies:  Review of patient's allergies indicates:   Allergen Reactions    Invokana [canagliflozin] Anaphylaxis    Percocet [oxycodone-acetaminophen] Nausea Only and Hallucinations    Biaxin [clarithromycin]     Hydrocodone Other (See Comments)     Dizzy/nausea/hallucinations    Sulfa (sulfonamide antibiotics) Nausea Only and Rash       Code Status: Full Code     Diet: diabetic diet: 2000 calorie               Activities:   - Activity as tolerated   - Up in a chair each morning as tolerated   - Ambulate with assistance to bathroom   - May use walker, cane, or self-propelled wheelchair    Weight Bearing Status: Non weight bearing to right lower extremity    Nursing: Out of bed BID, Up with assistance  Measure height and weight on admit    Nursing Precautions:           - Fall precautions per nursing home protocol    Labs: Per facility protocol    CONSULTS:      Physical Therapy to evaluate and treat 5 times a week      Occupational Therapy to evaluate and treat 5 times a week       MISCELLANEOUS CARE:  Home Infusion Therapy:   SN to perform Infusion Therapy/Central Line Care.  Review Central Line Care & Central Line Flush with patient.    Continue IV Oxacillin 12 grams every 24 hours continuous infusion until 10/20.  This will complete 6 weeks of IV antibiotics from first negative blood culture - a sufficient duration to treat endocarditis if present.  No need for ROBERTA.  This will also complete the 14 days of post-BKA antibiotics recommended by Orthopedics.    Accepting facility to  perform weekly labs (CBC,CMP,CRP), and PICC line dressing changes.  Facility to fax results to [facility] Fax Number: [phone].   ID will schedule pt for ID clinic f/u appt after completion of Rehab.   If ID appt not seen under patient's appointments tab, please call ID dept to schedule appt before pt discharge.    Scrub the Hub: Prior to accessing the line, always perform a 30 second alcohol scrub  Each lumen of the central line is to be flushed at least daily with 10 mL Normal Saline and 3 mL Heparin flush (10 units/mL)  Skilled Nurse (SN) may draw blood from IV access  Blood Draw Procedure:   - Aspirate at least 5 mL of blood   - Discard   - Obtain specimen   - Change injection cap   - Flush with 20 mL Normal Saline followed by a                 3-5 mL Heparin flush (10 units/mL)  Central :   - Sterile dressing changes are done weekly and as needed.   - Use chlor-hexadine scrub to cleanse site, apply Biopatch to insertion site,       apply securement device dressing   - Injection caps are changed weekly and after EVERY lab draw.   - If sterile gauze is under dressing to control oozing,                 dressing change must be performed every 24 hours until gauze is not needed.    WOUND CARE ORDERS:  yes:  Surgical Wound:  Location: Lower back donor site for flap  Keep area clean and dry and cover with large foam dressing every 3 days.     Keep Prevena wound vac in place to right lower leg BKA incision site and do not remove for 1 week. Call Orthopedics about wound vac removal.     DIABETES CARE:     SN to perform and educate Diabetic management with blood glucose monitoring:, Fingerstick blood sugar before meals and nightly, and Report CBG < 60 or > 350 to physician.                                          Insulin Sliding Scale          Glucose  Novolog Insulin Subcutaneous        0 - 60   Orange juice or glucose tablet, hold insulin      No insulin   201-250  2 units   251-300  4  units   301-350  6 units   351-400  8 units   >400   10 units then call physician    Medications:     Current Discharge Medication List        START taking these medications    Details   0.9% NaCl SolP 500 mL with oxacillin 1 gram SolR 12 g Inject 12 g into the vein once daily. End date 10/20/2024.      amLODIPine (NORVASC) 10 MG tablet Take 1 tablet (10 mg total) by mouth once daily.    Comments: .      apixaban (ELIQUIS) 2.5 mg Tab Take 1 tablet (2.5 mg total) by mouth 2 (two) times daily.      calcium carbonate (TUMS) 200 mg calcium (500 mg) chewable tablet Take 2 tablets (1,000 mg total) by mouth 3 (three) times daily as needed for Heartburn.      furosemide (LASIX) 40 MG tablet Take 1 tablet (40 mg total) by mouth once daily.      hydrALAZINE (APRESOLINE) 50 MG tablet Take 1 tablet (50 mg total) by mouth every 8 (eight) hours.    Comments: .      pantoprazole (PROTONIX) 40 MG tablet Take 1 tablet (40 mg total) by mouth once daily.  Qty: 90 tablet, Refills: 3      senna (SENOKOT) 8.6 mg tablet Take 1 tablet by mouth 2 (two) times a day.      sucralfate (CARAFATE) 100 mg/mL suspension Take 10 mLs (1 g total) by mouth every 6 (six) hours.      traMADoL (ULTRAM) 50 mg tablet Take 1 tablet (50 mg total) by mouth every 6 (six) hours as needed (Moderate pain 4-6/10).    Comments: n/a       traZODone (DESYREL) 100 MG tablet Take 1 tablet (100 mg total) by mouth every evening.  Qty: 30 tablet, Refills: 11           CONTINUE these medications which have CHANGED    Details   acetaminophen (TYLENOL) 500 MG tablet Take 2 tablets (1,000 mg total) by mouth every 8 (eight) hours.      aspirin (ECOTRIN) 81 MG EC tablet Take 1 tablet (81 mg total) by mouth once daily.      gabapentin (NEURONTIN) 300 MG capsule Take 1 capsule (300 mg total) by mouth 2 (two) times daily.      losartan (COZAAR) 25 MG tablet Take 2 tablets (50 mg total) by mouth once daily.    Comments: .      methocarbamoL (ROBAXIN) 500 MG Tab Take 1 tablet (500 mg  total) by mouth every 8 (eight) hours.      morphine (MSIR) 15 MG tablet Take 1 tablet (15 mg total) by mouth every 4 (four) hours as needed (Severe pain 7-10/10).    Comments: n/a       vitamin D (VITAMIN D3) 1000 units Tab Take 1 tablet (1,000 Units total) by mouth once daily.           CONTINUE these medications which have NOT CHANGED    Details   atorvastatin (LIPITOR) 40 MG tablet Take 40 mg by mouth once daily.         celecoxib (CELEBREX) 200 MG capsule Take 1 capsule (200 mg total) by mouth once daily.      cyanocobalamin (VITAMIN B-12) 1000 MCG tablet Take 1,000 mcg by mouth once daily.      DULoxetine (CYMBALTA) 60 MG capsule Take 1 capsule (60 mg total) by mouth once daily.  Qty: 90 capsule, Refills: 0    Associated Diagnoses: Depression, unspecified depression type; Chronic pain syndrome      emtricitabine-tenofovir 200-300 mg (TRUVADA) 200-300 mg Tab Take 1 tablet by mouth once daily.  Qty: 30 tablet, Refills: 0    Associated Diagnoses: Exposure to HIV      fish oil-omega-3 fatty acids 300-1,000 mg capsule Take 1 capsule by mouth 2 (two) times daily.      HUMALOG U-100 INSULIN 100 unit/mL injection 1:20 U PRN high blood sugar and snacks. Correction dose- Enter carb coverage intake upon administration  Target number 120 Carbohydrate coverage #1 1:2 Carbohydrate coverage #1 time [phone] Carbohydrate coverage #2 1:3 Carbohydrate coverage #2 time [phone] Carbohydrate coverage #3 Carbohydrate coverage #3 time Carbohydrate coverage #4 Carbohydrate coverage #4 time Sensitivity #1 1:20 Sensitivity #1 time 2470-5155 Sensitivity #2 Sensitivity #2 time Sensitivity #3 Sensitivity #3 time Sensitivity #4 Sensitivity #4 time Sensitivity #5 Sensitivity #5 time Order Questions Question Answer Comment       50 U SQ continuous  Target number 120 Basal Rate #1 2.3 Basal rate #1 time 8789-7438 Basal Rate #2 1.55 Basal rate #2 time 7105-3788 Basal rate #3 Basal rate #3 time Basal rate #4 Basal rate #4 time Basal rate #5  Basal rate #5 time         melatonin (MELATIN) 3 mg tablet Take 2 tablets (6 mg total) by mouth nightly as needed for Insomnia.      MOUNJARO 10 mg/0.5 mL PnIj Inject 10 mg into the skin once a week. HOLD until all surgeries completed and out of rehab      nortriptyline (PAMELOR) 50 MG capsule Take 1 capsule (50 mg total) by mouth nightly.  Qty: 90 capsule, Refills: 0      ondansetron (ZOFRAN-ODT) 4 MG TbDL Take 2 tablets (8 mg total) by mouth every 6 (six) hours as needed (nausea).      polyethylene glycol (GLYCOLAX) 17 gram PwPk Take 17 g by mouth once daily.         senna-docusate 8.6-50 mg (PERICOLACE) 8.6-50 mg per tablet Take 1 tablet by mouth 2 (two) times daily.           STOP taking these medications       enoxaparin (LOVENOX) 40 mg/0.4 mL Syrg Comments:   Reason for Stopping:                Follow-up:   Future Appointments   Date Time Provider Department Center   10/8/2024 12:00 PM Alisa Prater PA-C [facility]TH[facility] [address]   10/15/2024  9:30 AM Alisa Prater PA-C [facility]THO [address]   10/22/2024 10:00 AM Toya Carlin APRN, ANP [facility] ID [address]   10/29/2024  1:00 PM Andrew Daigle MD [facility] CARDIO [address]   11/12/2024  9:45 AM Alisa Prater PA-C [facility] [address]        _________________________________  Raisa Kearns MD  10/04/2024

## 2024-10-04 NOTE — PROGRESS NOTES
[facility]  Endocrinology  Progress Note    Admit Date: 9/6/2024     Reason for Consult: Management of T2DM, Hyperglycemia      Surgical Procedure and Date: S/P irrigation debridement of RLE on 09/06/2024    Diabetes diagnosis year: 1995     Home Diabetes Medications:    Humalog via OmniPod insulin pump  Mounjaro 10 mg weekly     Current pump settings:  Basal 12 am to 2.3 and 6 am to 1.55  ICR to 3 at 12 am, 2 at 4 pm  ISF to 1:20   AIT 3 hrs  Target 110-120        Patient had anaphylaxis reaction to SGLT2 inhibitors specifically Invokana     How often checking glucose at home? Dexcom G6   BG readings on regimen: Average 210's per Dexcom  Hypoglycemia on the regimen?  Yes  Missed doses on regimen?  No     Diabetes Complications include:     Hyperglycemia and Diabetic peripheral neuropathy         Complicating diabetes co morbidities:   HLD, HTN, Obesity         HPI: 64 y.o. male presents to the ED w/ complaint of altered mental status. Hx of R ankle fracture with surgery over a month ago. Patient now presents with sepsis 2/2 R ankle infection s/p ORIF on 07/26. Started on IV vanc and cefepime. Given IVFs. Blood cultures pending. Brought to OR with ortho on 09/06 for irrigation debridement of RLE. Endocrine following for BG and type 2 diabetes.     Lab Results   Component Value Date    LABA1C 10.8 (H) 08/15/2016    HGBA1C 8.5 (H) 09/06/2024         Interval HPI:   Overnight events:  No acute events overnight. Patient in room 541/541 A. Blood glucose stable. BG above goal on current insulin regimen (SSI, prandial, and basal insulin ). Steroid use- None. 3 Days Post-Op  Renal function- Normal   Vasopressors-  None     Lab Results   Component Value Date    CREATININE 1.1 10/04/2024         Endocrine will continue to follow and manage insulin orders inpatient. Of note, the patient did not receive appropriate insulin administration yesterday resulting in BG excursions.         Diet diabetic 2000 Calories (up to 75  "gm per meal)     Eatin%  Nausea: No  Hypoglycemia and intervention: No  Fever: No  TPN and/or TF: No      BP (!) 130/59 (BP Location: Left arm, Patient Position: Lying)   Pulse 99   Temp 98 °F (36.7 °C) (Tympanic)   Resp 18   Ht 5' 5" (1.651 m)   Wt 93 kg (204 lb 15.7 oz)   SpO2 (!) 94%   BMI 34.11 kg/m²     Labs Reviewed and Include    Recent Labs   Lab 10/04/24  0510   *   CALCIUM 7.7*   ALBUMIN 1.4*   PROT 5.1*      K 3.9   CO2 20*      BUN 19   CREATININE 1.1   ALKPHOS 121   ALT 11   AST 15   BILITOT 0.3     Lab Results   Component Value Date    WBC 10.09 10/04/2024    HGB 8.3 (L) 10/04/2024    HCT 24.4 (L) 10/04/2024    MCV 88 10/04/2024     (H) 10/04/2024     No results for input(s): "TSH", "FREET4" in the last 168 hours.  Lab Results   Component Value Date    HGBA1C 8.5 (H) 2024       Nutritional status:   Body mass index is 34.11 kg/m².  Lab Results   Component Value Date    ALBUMIN 1.4 (L) 10/04/2024    ALBUMIN 1.6 (L) 10/03/2024    ALBUMIN 1.7 (L) 10/02/2024     Lab Results   Component Value Date    PREALBUMIN 3 (L) 2024    PREALBUMIN 13 (L) 2024       Estimated Creatinine Clearance: 72 mL/min (based on SCr of 1.1 mg/dL).    Accu-Checks  Recent Labs     10/02/24  0758 10/02/24  1150 10/02/24  1228 10/02/24  1721 10/02/24  2256 10/03/24  0752 10/03/24  1145 10/03/24  1631 10/03/24  2037 10/04/24  0718   POCTGLUCOSE 105 49* 85 59* 122* 206* 344* 285* 229* 265*       Current Medications and/or Treatments Impacting Glycemic Control  Immunotherapy:    Immunosuppressants       None          Steroids:   Hormones (From admission, onward)      Start     Stop Route Frequency Ordered    10/02/24 2100  melatonin tablet 6 mg         -- Oral Nightly 10/02/24 1101          Pressors:    Autonomic Drugs (From admission, onward)      None          Hyperglycemia/Diabetes Medications:   Antihyperglycemics (From admission, onward)      Start     Stop Route Frequency " Ordered    10/04/24 0900  insulin glargine U-100 (Lantus) pen 16 Units         -- SubQ Daily 10/03/24 0944    10/03/24 1130  insulin aspart U-100 pen 2-6 Units         -- SubQ 3 times daily with meals 10/03/24 0953    09/27/24 1013  insulin aspart U-100 pen 0-10 Units         -- SubQ Before meals & nightly PRN 09/27/24 0913            ASSESSMENT and PLAN    Cardiac/Vascular  Pure hypercholesterolemia  On statin per ADA guidelines       Endocrine  Uncontrolled type 2 diabetes mellitus with hyperglycemia  BG goal 140-180    - Lantus (Insulin Glargine) 16 units daily   - Novolog 2-6 units TIDWM (Administer 2 units if patient eats 25% of meal, 4  units if patient eats 50% of meal, administer 5 units if the patient eats 75% of meal and administer and 6 units if patient eats 100% of meal. Hold if patient eats less than 25% of meal).  - [facility] SSI (150/25)  - BG checks AC/HS  - Hypoglycemia protocol in place    Hypoglycemic episodes yesterday evening.  Discussed with patient the possibility of returning to home insulin pump.  Will continue to monitor.    ** Please notify Endocrine for any change and/or advance in diet**  ** Please call Endocrine for any BG related issues **    Discharge Planning:   - Patient previously on Omnipod 5 with Dexcom integration at the [facility] rehab facility. BG levels remained well controlled on the pump while in rehab. Recommend patient resume insulin pump at the [facility] rehab facility in order to optimize BG control. In the event the patient runs out of supplies or is unable to manage his insulin pump recommend the following:     - Lantus (Insulin Glargine) 16 units daily   - Novolog 2-6 units TIDWM (Administer 2 units if patient eats 25% of meal, 4  units if patient eats 50% of meal, administer 5 units if the patient eats 75% of meal and administer and 6 units if patient eats 100% of meal. Hold if patient eats less than 25% of meal).  - [facility] SSI (150/25)  - BG checks AC/HS    Please note, the patient  has fluctuating insulin requirements based on intake and the above recommendation may require adjustments.   Please notify [facility] endocrinology for an concerns about insulin pump therapy in the rehab setting.             Orthopedic  * Surgical wound breakdown  Optimize BG control to improve wound healing  Managed per primary team               Brandyn Malin, DNP, FNP  Endocrinology  [facility] - Surgery

## 2024-10-04 NOTE — SUBJECTIVE & OBJECTIVE
"Interval HPI:   Overnight events:  No acute events overnight. Patient in room 541/541 A. Blood glucose stable. BG above goal on current insulin regimen (SSI, prandial, and basal insulin ). Steroid use- None. 3 Days Post-Op  Renal function- Normal   Vasopressors-  None     Lab Results   Component Value Date    CREATININE 1.1 10/04/2024         Endocrine will continue to follow and manage insulin orders inpatient. Of note, the patient did not receive appropriate insulin administration yesterday resulting in BG excursions.         Diet diabetic 2000 Calories (up to 75 gm per meal)     Eatin%  Nausea: No  Hypoglycemia and intervention: No  Fever: No  TPN and/or TF: No      BP (!) 130/59 (BP Location: Left arm, Patient Position: Lying)   Pulse 99   Temp 98 °F (36.7 °C) (Tympanic)   Resp 18   Ht 5' 5" (1.651 m)   Wt 93 kg (204 lb 15.7 oz)   SpO2 (!) 94%   BMI 34.11 kg/m²     Labs Reviewed and Include    Recent Labs   Lab 10/04/24  0510   *   CALCIUM 7.7*   ALBUMIN 1.4*   PROT 5.1*      K 3.9   CO2 20*      BUN 19   CREATININE 1.1   ALKPHOS 121   ALT 11   AST 15   BILITOT 0.3     Lab Results   Component Value Date    WBC 10.09 10/04/2024    HGB 8.3 (L) 10/04/2024    HCT 24.4 (L) 10/04/2024    MCV 88 10/04/2024     (H) 10/04/2024     No results for input(s): "TSH", "FREET4" in the last 168 hours.  Lab Results   Component Value Date    HGBA1C 8.5 (H) 2024       Nutritional status:   Body mass index is 34.11 kg/m².  Lab Results   Component Value Date    ALBUMIN 1.4 (L) 10/04/2024    ALBUMIN 1.6 (L) 10/03/2024    ALBUMIN 1.7 (L) 10/02/2024     Lab Results   Component Value Date    PREALBUMIN 3 (L) 2024    PREALBUMIN 13 (L) 2024       Estimated Creatinine Clearance: 72 mL/min (based on SCr of 1.1 mg/dL).    Accu-Checks  Recent Labs     10/02/24  0758 10/02/24  1150 10/02/24  1228 10/02/24  1721 10/02/24  2256 10/03/24  0752 10/03/24  1145 10/03/24  1631 10/03/24  2037 " 10/04/24  0718   POCTGLUCOSE 105 49* 85 59* 122* 206* 344* 285* 229* 265*       Current Medications and/or Treatments Impacting Glycemic Control  Immunotherapy:    Immunosuppressants       None          Steroids:   Hormones (From admission, onward)      Start     Stop Route Frequency Ordered    10/02/24 2100  melatonin tablet 6 mg         -- Oral Nightly 10/02/24 1101          Pressors:    Autonomic Drugs (From admission, onward)      None          Hyperglycemia/Diabetes Medications:   Antihyperglycemics (From admission, onward)      Start     Stop Route Frequency Ordered    10/04/24 0900  insulin glargine U-100 (Lantus) pen 16 Units         -- SubQ Daily 10/03/24 0944    10/03/24 1130  insulin aspart U-100 pen 2-6 Units         -- SubQ 3 times daily with meals 10/03/24 0953    09/27/24 1013  insulin aspart U-100 pen 0-10 Units         -- SubQ Before meals & nightly PRN 09/27/24 0913

## 2024-10-04 NOTE — PT/OT/SLP PROGRESS
Occupational Therapy   Co-Treatment    Name: Cortes Schroeder  MRN: [id number]  Admitting Diagnosis:  Surgical wound breakdown  3 Days Post-Op    Recommendations:     Discharge Recommendations: High Intensity Therapy  Discharge Equipment Recommendations:  drop arm commode  Barriers to discharge:  None    Assessment:     Cortes Schroeder is a 64 y.o. male with a medical diagnosis of Surgical wound breakdown.  He presents with no c/o however tolerate da bed to wheelchair t/f. Performance deficits affecting function are weakness, impaired self care skills, gait instability, impaired endurance, impaired balance, decreased lower extremity function, pain, orthopedic precautions, impaired skin.     Rehab Prognosis:  Good; patient would benefit from acute skilled OT services to address these deficits and reach maximum level of function.       Plan:     Patient to be seen 4 x/week to address the above listed problems via self-care/home management, therapeutic activities, therapeutic exercises  Plan of Care Expires: 10/18/24  Plan of Care Reviewed with: patient    Subjective     Chief Complaint: None   Patient/Family Comments/goals: Pt.report he is just missing his leg at the moment  Pain/Comfort:  Pain Rating 1: 0/10  Location - Side 1: Right  Location 1: leg  Pain Addressed 1: Distraction, Reposition, Cessation of Activity    Objective:     Communicated with: Nurse prior to session.  Patient found HOB elevated with wound vac upon OT entry to room.    General Precautions: Standard, fall    Orthopedic Precautions:RLE non weight bearing  Braces: N/A  Respiratory Status: Room air     Occupational Performance:     Bed Mobility:    Patient completed Rolling/Turning to Left with  contact guard assistance  Patient completed Supine to Sit with contact guard assistance     Functional Mobility/Transfers:  Patient completed Sit <> Stand Transfer with minimum assistance  with  rolling walker   Patient completed Bed <> Chair Transfer using  Step Transfer technique with minimum assistance and of 2 persons with rolling walker    Activities of Daily Living: Politely decline all further ADLs  Upper Body Dressing: stand by assistance donning gown posteriorly while seated at EOB      [facility] 6 Click ADL: 19    Treatment & Education:  Pt educated on role of occupational therapy, POC, and safety during ADLs and functional mobility. Pt and OT discussed importance of safe, continued mobility to optimize daily living skills. Pt verbalized understanding. Pt given instruction to call for medical staff/nurse for assistance.   Co-treatment with PT for maximal pt participation, safety, and activity tolerance    Patient left up in chair with all lines intact, call button in reach, and family member present    GOALS:   Multidisciplinary Problems       Occupational Therapy Goals          Problem: Occupational Therapy    Goal Priority Disciplines Outcome Interventions   Occupational Therapy Goal     OT, PT/OT Progressing    Description: Goals to be met by: 10/18/24     Patient will increase functional independence with ADLs by performing:    UE Dressing with Supervision.  LE Dressing with Supervision.  Grooming while seated at sink with Set-up Assistance.  Toileting from toilet with Stand-by Assistance for hygiene and clothing management.   All functional transfers performed with SBA                         Time Tracking:     OT Date of Treatment: 10/04/24  OT Start Time: 1353  OT Stop Time: 1405  OT Total Time (min): 12 min    Billable Minutes:Therapeutic Activity 12    OT/ANA: OT          10/4/2024

## 2024-10-04 NOTE — PT/OT/SLP PROGRESS
Physical Therapy Co-Treatment    OT present for cotreat due to pt's multiple medical comorbidities and functional/cognition deficits requiring two skilled therapists to appropriately progress pt's musculoskeletal strength, neuromuscular control, and endurance while taking into consideration medical acuity and pt safety.    Patient Name:  Cortes Schroeder   MRN:  [id number]    Recommendations:     Discharge Recommendations: High Intensity Therapy  Discharge Equipment Recommendations: bedside commode  Barriers to discharge: None    Assessment:     Cortes Schroeder is a 64 y.o. male admitted with a medical diagnosis of Surgical wound breakdown.  He presents with the following impairments/functional limitations: weakness, impaired endurance, impaired balance, gait instability, decreased lower extremity function, impaired functional mobility, impaired self care skills, decreased coordination     Pt receptive and tolerated PT co-treatment with OT well. Pt needed min A of 2 persons to perform small hops with RW to w/c this session. Patient has demonstrated sufficient progression to warrant high intensity therapy evidenced by objectives noted below.    Rehab Prognosis: Good; patient would benefit from acute skilled PT services to address these deficits and reach maximum level of function.    Recent Surgery: Procedure(s) (LRB):  AMPUTATION, BELOW KNEE - RIGHT (Right) 3 Days Post-Op    Plan:     During this hospitalization, patient to be seen 4 x/week to address the identified rehab impairments via gait training, therapeutic activities, therapeutic exercises, neuromuscular re-education and progress toward the following goals:    Plan of Care Expires:  11/01/24    Subjective     Chief Complaint: none reported  Patient/Family Comments/goals: Pt motivated to go to rehab  Pain/Comfort:  Pain Rating 1: 0/10  Pain Rating Post-Intervention 1: 0/10      Objective:     Communicated with RN prior to session.  Patient found HOB elevated  with wound vac upon PT entry to room.     General Precautions: Standard, fall  Orthopedic Precautions: RLE non weight bearing  Braces: N/A  Respiratory Status: Room air     Functional Mobility:    Bed Mobility:   Rolling: to L with contact guard assistance  Supine > Sit: contact guard assistance    Transfers:   Sit <> Stand Transfer: minimum assistance from EOB using rolling walker   Bed <> Chair: minimum assistance and of 2 persons using rolling walker     Balance:   Sitting balance: FAIR+: Maintains balance through MINIMAL excursions of active trunk motion  Standing balance:   POOR+: Needs MINIMAL assist to maintain  POOR+: Needs MIN (minimal ) assist during gait                 Gait:  Distance: ~5 small hops/shuffling steps with LLE to w/c   Assistive Device: RW  Assistance Level: minimum assistance and of 2 persons  Gait Assessment: Pt needed verbal cues for increased WB through BUEs on walker to perform small hops/shuffling steps to w/c      AM-PAC 6 CLICK MOBILITY  Turning over in bed (including adjusting bedclothes, sheets and blankets)?: 4  Sitting down on and standing up from a chair with arms (e.g., wheelchair, bedside commode, etc.): 3  Moving from lying on back to sitting on the side of the bed?: 4  Moving to and from a bed to a chair (including a wheelchair)?: 3  Need to walk in hospital room?: 2  Climbing 3-5 steps with a railing?: 1  Basic Mobility Total Score: 17       Treatment & Education:  Pt educated on tip to reduce fall risk and safety with mobility and using call button for assistance from nursing staff with OOB mobility.  Pt educated on sitting up in chair throughout most of day  Pt educated on amb 2-3x per day with assistance from staff and increased movement during hospital stay.  All questions answered within the scope of PT.  White board updated accordingly.    Patient left  in wheelchair  with all lines intact and call button in reach..    GOALS:   Multidisciplinary Problems        Physical Therapy Goals          Problem: Physical Therapy    Goal Priority Disciplines Outcome Interventions   Physical Therapy Goal     PT, PT/OT Progressing    Description: Goals to be met by: 10/18/24     Patient will increase functional independence with mobility by performin. Supine to sit with [place]  2. Sit to stand transfer with Contact Guard Assistance  3. Bed to chair transfer with Contact Guard Assistance using Rolling Walker  4. Gait  x 10 feet with Contact Guard Assistance using Rolling Walker.   5. Stand for 5 minutes with Stand-by Assistance using Rolling Walker  6. Pt to propel w/c 100ft on level surface with B UE with supervision-not met    Patient has a mobility limitation that significantly impairs their ability to participate in one or more mobility related activities of daily living, including toileting. This deficit can be resolved by using a bedside commode. Patient demonstrates mobility limitations that will cause them to be confined to one room at home without bathroom access for up to 30 days. Using a bedside commode will greatly improve the patient's ability to participate in MRADLs.                           Time Tracking:     PT Received On: 10/04/24  PT Start Time: 1353     PT Stop Time: 1405  PT Total Time (min): 12 min     Billable Minutes: Gait Training 12    Treatment Type: Treatment  PT/PTA: PT           10/04/2024

## 2024-10-04 NOTE — NURSING
Patient AAOx4, VSS, rested well with eyes closed, NADN. Bed in lowest position, call light in reach along with personal items. Plan of care ongoing.

## 2024-10-04 NOTE — PLAN OF CARE
10/04/24 0728   Post-Acute Status   Post-Acute Authorization Placement   Post-Acute Placement Status Pending medical clearance/testing   Discharge Plan   Discharge Plan A Rehab     Pt to admit to [facility], awaiting confirmation on MD if medically stable. SW following.    Aida Rosa LMSW  Case Management   [facility]   Ext. [phone]

## 2024-10-04 NOTE — PLAN OF CARE
10/04/24 1317   Post-Acute Status   Post-Acute Authorization Placement   Post-Acute Placement Status Set-up Complete/Auth obtained   Discharge Plan   Discharge Plan A Skilled Nursing Facility     Patient's set-up has been completed.ELIAS scheduled d/c transportation to [facility] through [facility]. Patient is scheduled to be picked up at 2:00 PM. ELIAS provided patient's nurse with report number #[phone]; ask for the nurse for the patient. Requested  time does not guarantee arrival time.      ELIAS informed the patient at bedside, agreeable.     Aida Rosa LMSW  Case Management   [facility]   Ext. [phone]

## 2024-10-04 NOTE — ASSESSMENT & PLAN NOTE
BG goal 140-180    - Lantus (Insulin Glargine) 16 units daily   - Novolog 2-6 units TIDWM (Administer 2 units if patient eats 25% of meal, 4  units if patient eats 50% of meal, administer 5 units if the patient eats 75% of meal and administer and 6 units if patient eats 100% of meal. Hold if patient eats less than 25% of meal).  - [facility] SSI (150/25)  - BG checks AC/HS  - Hypoglycemia protocol in place    Hypoglycemic episodes yesterday evening.  Discussed with patient the possibility of returning to home insulin pump.  Will continue to monitor.    ** Please notify Endocrine for any change and/or advance in diet**  ** Please call Endocrine for any BG related issues **    Discharge Planning:   - Patient previously on Omnipod 5 with Dexcom integration at the [facility] [place] rehab facility. BG levels remained well controlled on the pump while in rehab. Recommend patient resume insulin pump at the [facility] rehab facility in order to optimize BG control. In the event the patient runs out of supplies or is unable to manage his insulin pump recommend the following:     - Lantus (Insulin Glargine) 16 units daily   - Novolog 2-6 units TIDWM (Administer 2 units if patient eats 25% of meal, 4  units if patient eats 50% of meal, administer 5 units if the patient eats 75% of meal and administer and 6 units if patient eats 100% of meal. Hold if patient eats less than 25% of meal).  - [facility] SSI (150/25)  - BG checks AC/HS    Please note, the patient has fluctuating insulin requirements based on intake and the above recommendation may require adjustments.   Please notify [facility] [place] [place] endocrinology for an concerns about insulin pump therapy in the rehab setting.

## 2024-10-05 PROBLEM — D75.839 THROMBOCYTOSIS: Status: RESOLVED | Noted: 2024-09-09 | Resolved: 2024-10-05

## 2024-10-06 NOTE — ASSESSMENT & PLAN NOTE
Resolved. Sodium back to normal at 140 on 9/20.   Hyponatremia is likely due to Dehydration/hypovolemia. The patient's most recent sodium results are listed below.  Recent Labs     10/03/24  0304 10/04/24  0510   * 136       Plan  - Correct the sodium by 4-6mEq in 24 hours.   - Encourage oral intake.   - Monitor sodium Daily. Patient asymptomatic.   - Patient hyponatremia is stable.

## 2024-10-06 NOTE — ASSESSMENT & PLAN NOTE
Chronic condition. Trazodone 50 mg po nightly to help with sleep. Patient still having some sleep disturbances so will increase to 100 mg po nightly on 10/3. Continue Trazodone 100 mg po daily to treat on discharge.

## 2024-10-06 NOTE — DISCHARGE SUMMARY
[facility] - Surgery  [facility] Medicine  Discharge Summary      Patient Name: Cortes Schroeder  MRN: [id number]  JIMMIE: [id number]  Patient Class: IP- Inpatient  Admission Date: 9/6/2024  Hospital Length of Stay: 28 days  Discharge Date and Time: 10/4/2024  2:45 PM  Attending Physician: Raisa Kearns MD   Discharging Provider: Raisa Kearns MD  Primary Care Provider: Andrew Sinclair Jr., MD  Hospital Medicine Team: [facility] Raisa Kearns MD  Primary Care Team: [facility]    HPI:   Cortes Schroeder is a 64 y.o. M with PMHx of anxiety, T2DM, GERD, HLD, HTN who presented to ED for AMS. He had a fall in July that resulted in R trimalleolar fracture and L distal radius fracture. He had external fixation on 07/18 and then ORIF on 07/26 with Dr. Liz. He was prescribed clinda 300 mg on 08/28 for a ten day course. Patient was doing well when he acutely became altered and not acting like himself a few hours ago. Patient family member drove him here from [facility] for ortho consult. R medial ankle wound dehisced with redness and swelling around it. No CPM fever, cough, N/V/D or seizure.    In ED: T max 99.1. SIRS 2/4 with WBC 18 and . CMP notable for Na 127, Cr 1.7, . Phos 1.9. .3. BNP 73. Trop neg. A1c 8.5. R tib fib XR notes There are 2 abandoned anterior-posteriorly oriented pin tracks in the right mid tibial shaft.  On AP views, each of these pin tracks demonstrates a small faint internal density, possibly representing a sequestrum. Ortho and endocrine consulted. Given IV vanc and IV clindamycin. Given 2.7L IVFs. Admitted to hospital medicine for sepsis 2/2 infected hardware.       10/1/2024  Procedure(s) (LRB):  AMPUTATION, BELOW KNEE - RIGHT (Right)   Surgeon(s):  Moe Liz MD Volkman, T. Kramer, MD       9/26/2024   Procedure(s) (LRB):  CREATION, FREE FLAP (Right) - Unable to successfully place free flap   Surgeons and Role:  Juanito Cueto, DO -  Primary  Tonya Paniagua MD - Resident - Assisting  Destin Enamorado MD - Fellow  Kristian Silva MD - Fellow         09/23/2024  Procedure:        Excisional and non excisional debridement right ankle wounds medial and lateral, skin, subcutaneous tissue and fascia, 10 sq cm  Arthrotomy with irrigation right ankle joint  Removal of hardware right medial and lateral ankle  Wound VAC placement right ankle medial and lateral, greater than 50 sq cm   Surgeon:   KIMBERLY Leyva M.D      09/20/2024  Procedure:        Excisional and non excisional debridement right medial ankle skin and subcutaneous tissue the cm  Arthrotomy with irrigation right ankle joint for infection  Wound VAC, less than 50 sq cm   Surgeon:   KIMBERLY Leyva M.D      9/17/2024  Operation/Procedure Performed:  Right lower extremity angiogram  PTA right posterior tibial artery ([id number] balloon; treatment length 280mm)  PTA right anterior tibial artery ([id number] balloon; treatment length 330mm)    Attending Surgeon:   GINA Morton II, MD       09/17/2024   Procedure:        Irrigation we will change right medial ankle less than 50 sq cm   Surgeon:   KIMBERLY Leyva M.D      09/09/2024  Procedure:        Excisional and non excisional debridement and irrigation right medial ankle wound skin and subcutaneous tissue and fascia 5 sq cm   Arthrotomy right ankle with irrigation    Surgeon:   KIMBERLY Leyva M.D       09/06/2024  Procedure:        Excisional debridement and irrigation right medial and lateral ankle wounds, skin, subcutaneous tissue and fascia, 15 sq cm with irrigation                         Fasciocutaneous advancement lateral ankle wound 20 cm  Wound VAC placement medial right ankle wound, less than 50 sq cm                          Surgeon:   KIMBERLY Leyva M.D      Hospital Course:   Cortes Schroeder is a 65 y/o male admitted to  hospital medicine for sepsis related to right ankle from surgical wound infection in patient with recent ORIF of right ankle fracture done on 7/26/2024. Patient started on empiric IV Vancomycin and IV Cefepime and given IVF's as per sepsis protocol. Orthopedics consulted and patient taken to OR on 9/6/2024 and had irrigation debridement of right surgical incision and placement of wound vac.Endocrine consulted to assist in management of his diabetes while in hospital. Blood cultures from admit returned with MSSA. Infectious Disease consulted. Wound culture returned positive for Group B streptococcus and MSSA. Echo done due to bacteremia without concern for vegetations. ID changed antibiotics to IV Oxacillin. Patient with new cough and worsening WBC. CTA chest negative for PE but notes small area of ground glass changes to RUL. Patient placed on 5 day course of po Doxycycline to treat as per ID and completed for possible pneumonia. Patient taken back to OR on 09/09/2024 with Ortho and had another irrigation and debridement and replacement of wound vac to right ankle. Plastics consulted for flap evaluation and recommended vascular evaluation. CTA right lower extremity done and showed moderate atherosclerosis and multifocal high grade stenosis throughout right calf arteries. Dietary consulted for malnutrition and started on Boost supplementation to maximize his nutrition for a flap and wound healing. Vascular surgery consulted per Plastics recs as they would like to pre-optimize blood flow prior to any free flap or pedicled propeller flap. Vascular recommended angiogram of right leg but patient developed DEEPALI. Nephrology consulted and suspected ATN related to contrast nephropathy. Patient placed on supportive care. DEEPALI resolved. Patient taken for unilateral angiogram by Vascular on 9/17 and underwent PTA of right posterior tibial artery and right anterior tibial artery. After revascularization of right leg plan to do  flap but working on timing with Ortho and Plastics. Patient to go back to OR again on 9/20 for another irrigation and debridement and replacement of wound vac to right ankle wound by Ortho. Plastics plans propeller flap to right ankle on 9/25 and then will need to remain in hospital for 1 week after flap for dangling protocol and monitoring of flap per Plastics. Repeat blood cultures from 9/8 and 9/10 no growth. Patient taken back to OR on 9/20 with Dr. Moe Liz and had another I&D of right ankle and wound and replacement of wound vac. Patient to remain non weight bearing post-op and Plastics plans flap placement while in hospital on 9/25. Patient to go back to OR on for another I&D and wound vac change on 9/23. Patient taken back to OR on 9/23 with Dr. Liz and had another I&D and wound vac change with Orthopedics. Ortho noted anterolateral foot eschar developing. Distal lateral wound breakdown and ortho opened up majority of lateral wound and excised surrounding skin and subcutaneous tissue. Ortho removed all ankle hardware. Wound vacs placed medially and laterally. Patient re-evaluated by Plastics on 9/25 and state due to lateral wound in addition to anterior wound no longer a candidate for propeller flap and plastic will need to do free flap to cover both wounds. Plastics concerned if no adequate vascular targets, will be unable to cover and BKA is only option. Patient not very happy. Patient went to OR with Plastics on 9/26. Plastics tried multiple attempt for free flap to cover wounds to right ankle but had francisco poor targets and thus unsuccessful in placing free flap to cover ankle wounds. After discussion with patient and Orthopedics plan to proceed for right BKA. Patient taken to OR on 10/1/2024 with Dr. Moe Liz and underwent right lower extremity BKA. Perineural catheter placed by Anesthesia for post-op pain management. Infectious disease made new recommendations based on BKA and  recommended:  Continue IV Oxacillin 12 g q 24 hours continuous infusion until 10/20.  This will complete 6 weeks of IV antibiotics from first negative blood culture - a sufficient duration to treat endocarditis if present.  No need for ROBERTA.  This will also complete the 14 days of post-BKA antibiotics recommended by Orthopedics.    Recommend placement after hosp discharge for PT/OT, ADL assistance, wound care and IV abx - patient to d/c to [facility].  Accepting facility to perform weekly labs (CBC, CMP, CRP), and PICC line dressing changes.  Facility to fax results to [facility] Fax Number: [phone].  PT/OT re consulted post-op after BKA and recommending high intensity therapy. After discussion with patient, inpatient rehab referrals sent. Patient accepted to [facility] which was his first choice and authorization requested. Auth obtained. Order for PICC line placed on 10/3 and plan to discharge to [facility] on 10/4. PICC line placed on 10/3 with no complications. Patient doing well on day of discharge. He was complaining of diffuse swelling and did note to have swelling to hands and lower limbs and scrotum and likely third spacing fluid from all his surgeries and low albumin level. Patient started on Lasix 40 mg po daily to help with his diffuse swelling and to continue on discharge. Patient discharged in good condition to [facility] on 10/4 to continue PT/OT and recovery from recent BKA and continue IV Oxacillin through 10/20 as per ID recs. Patient discharged with Prevena wound vac to BKA site and to remain in place in 1 week and follow-up with Ortho in clinic for removal of wound vac. Pain well controlled to BKA site on discharge.      Goals of Care Treatment Preferences:  Code Status: Full Code    Living Will: Yes              SDOH Screening:  The patient was screened for utility difficulties, food insecurity, transport difficulties, housing insecurity, and interpersonal safety and  there were no concerns identified this admission.     Consults:   Consults (From admission, onward)          Status Ordering Provider     [facility]  Once        Provider:  (Not yet assigned)    Completed PABLO BLOOM     Inpatient consult to Physical Medicine Rehab  Once        Provider:  (Not yet assigned)    Completed MANSOOR SPENCER     Inpatient consult to Midline team  Once        Provider:  (Not yet assigned)    Completed MANSOOR SPENCER     Inpatient consult to Midline team  Once        Provider:  (Not yet assigned)    Completed PABLO BLOOM     Inpatient consult to Nephrology  Once        Provider:  (Not yet assigned)    Completed JOSE HARRIS     Inpatient consult to Vascular Surgery  Once        Provider:  (Not yet assigned)    Completed YULISA PINA     Inpatient consult to Midline team  Once        Provider:  (Not yet assigned)    Completed MANSOOR SPENCER     Inpatient consult to Midline team  Once        Provider:  (Not yet assigned)    Completed SHARAN LOVE     Inpatient consult to Registered Dietitian/Nutritionist  Once        Provider:  (Not yet assigned)    Completed MAMIE RODRÍGUEZ     Inpatient consult to Plastic Surgery  Once        Provider:  (Not yet assigned)    Completed MICHELLE WINKLER     Inpatient consult to Infectious Diseases  Once        Provider:  (Not yet assigned)    Completed MAMIE RODRÍGUEZ     Inpatient consult to Endocrinology  Once        Provider:  (Not yet assigned)    Completed MICHELLE WINKLER     Inpatient consult to Orthopedic Surgery  Once        Provider:  (Not yet assigned)    Completed FELECIA GALVIN            Psychiatric  Anxiety  Chronic condition. Trazodone 50 mg po nightly to help with sleep. Patient still having some sleep disturbances so will increase to 100 mg po nightly on 10/3. Continue Trazodone 100 mg po daily to treat on discharge.     ENT  Airway malacia  Noted on CT scan of chest.  Patient with no acute issues and incidentally noted on CT scan of chest. Patient asymptomatic.       Pulmonary  Acute hypoxemic respiratory failure-resolved as of 9/21/2024  Resolved. Patient on room air on 9/20.   Improving. Patient with Hypoxic Respiratory failure which is Acute.  he is not on home oxygen. Supplemental oxygen was provided and noted- 2 liters of oxygen.     Contributing diagnoses includes - Obesity Hypoventilation and Pneumonia - that was treated with 5 days of oral Doxycyline ended on 9/14. Labs and images were reviewed. Patient Has not had a recent ABG. Will treat underlying causes and adjust management of respiratory failure as follows-     - CT chest showing tracheomalachia/hyperinflation lungs. Has no history of excessive intubation or lung disease known to him. Using IS on exam and speaking in full sentences on exam, with small amount of clear/white sputum. No obvious signs of volume overload.  - CTA ordered and done and negative for PE, but small ground glass area to RUL representing infectious vs. Inflammatory process. Treated with 5 days of oral Doxycyline for suspected pneumonia.   - Mucinex BID, Acapella, and nebulized saline. Encourage IS use.  - prn duo nebs  prn Duoneb treatments for wheezing or SOB.   - Wean oxygen as tolerated to keep oxygen sats > 90%.     Cardiac/Vascular  Pure hypercholesterolemia  Present on admit. Continue with Lipitor 40 mg po daily to treat on discharge.      PAD (peripheral artery disease)  - Present on admit. Patient noted to have high grade stenosis on CTA of right leg. Vascular surgery consulted and patient taken to OR and had unilateral angiogram of right leg and had PT/AT artery stenosis on 9/17 and underwent PTA of right PT/AT arteries. Appreciate Vascular surgery assistance on case.   - Patient s/p free flap attempt with Plastics and did attempt donor flap from abdomen/flank area but unsuccessful so Orthopedics ended up doing right BKA as unable to  cover wounds to right leg with free flap.   - Continue Aspirin and Lipitor daily to treat PAD on discharge.       Sinus tachycardia-resolved as of 9/19/2024  Resolved. Suspect from volume down, IVF with improvement from 110s to 90s on 9/13      Renal/  Acute retention of urine-resolved as of 9/19/2024  Resolved on discharge.   Required delvalle 9/12 for retenetion, per nursing had had issues on/off the last few adys and had immediate return of >300 cc once placed and very concentrated dark urine. Urien with sediment now and darker yellow on exam 9/13. Questionable infectious process with bacerueia, hold treatment for now per ID and monitor. DEEPALI, unclear cause if from retention, renal consulted, will f/u trends  Plan for voiding trial later in week once cr improves consistently  Plan for delvalle removal on 9/18 given procedure toady and Cr normalized/ATN resolved so wait until tomorrow so no confounding factors and order placed for delvalle removal today. Delvalle removed on 9/18 and able to void on his own.       Metabolic acidosis-resolved as of 10/3/2024  Resolved. Discontinue sodium bicarbonate tablets on 9/22.   Related to DEEPALI. Patient started on sodium bicarbonate tablets and will continue. Monitor daily HCO3 levels with labs.   Lactate never drawn but improved to 18 now. Will titrate down na bicarb pending trends 9/28  Bicarb currently 20 on na bicarb so keeping on for now 10/2 and can titrate down clsoer to dc      Hyperphosphatemia-resolved as of 9/19/2024  Resolved. Binder started 9/14 as elevated from DEEPALI and stopped 9/16 as deepali better as is phos      Hypokalemia-resolved as of 9/23/2024  Resolved. Improved with oral replacement. Potassium low at 3.4 on 9/21 and will replace with oral potassium. Patient's most recent potassium results are listed below.   Recent Labs     10/03/24  0304 10/04/24  0510   K 4.1 3.9       Plan  - Replete potassium per protocol  - Monitor potassium Daily    Acute renal failure with  tubular necrosis-resolved as of 9/21/2024  Resolved. DEEPALI is likely due to ATN from contrast in combination with sepsis. Baseline creatinine is  1.0-1.2 . Most recent creatinine and eGFR are listed below.  Recent Labs     10/03/24  0304 10/04/24  0510   CREATININE 0.9 1.1   EGFRNORACEVR >60.0 >60.0        Plan  - Avoid nephrotoxins and renally dose meds for GFR listed above  - Monitor urine output, serial BMP, and adjust therapy as needed  - Patient with worsening creatinine after contrast studies and Cr peaked at 3.3 on 9/14.  Creatinine   Date Value Ref Range Status   10/04/2024 1.1 0.5 - 1.4 mg/dL Final   10/03/2024 0.9 0.5 - 1.4 mg/dL Final   10/02/2024 0.9 0.5 - 1.4 mg/dL Final   10/01/2024 0.8 0.5 - 1.4 mg/dL Final   09/30/2024 0.9 0.5 - 1.4 mg/dL Final     - Renal consulted. ATN noted on urine microscopy with casts, secondary to sepsis/contrast likely combination and recommended supportive care.   - Creatinine improved with supportive care and now back down to 1.2 on 9/19.   - Renally dose medications.   - Monitor daily BMP.     ID  On pre-exposure prophylaxis for HIV  Patient on Truvada as outpatient but held in hospital due to elevated AST and ALT that has resolved. Resume home Truvada on discharge.     MSSA bacteremia-resolved as of 10/4/2024  Bacteremia resolved.   - Blood cultures from admit grew MSSA. Repeat blood cultures done on 9/8 and 9/10 no growth.  - Infectious Disease consulted and patient placed on IV Oxacillin infusion as suspected source was right ankle surgical wound infection for bacteremia as grew MSSA from right ankle wound.   - Echo done without concern for vegetations.    Oncology  Acute blood loss anemia  - Controlled on discharge. No clinical signs of bleeding noted on discharge.   - Patient transfused 1 unit of PRBCs on 10/1 pre op for BKA as Hgb was 7.5 and 1 unit also transfused intra-op on 10/1 and Hgb improved to 10.2 on 10/2 and stable at 9.2 on 10/3.   - Patient transfused 2  units of PRBCs on 9/26 during free flap attempts by Plastics.  - No clinical signs of bleeding and will monitor.   - Anemia is likely due to acute blood loss from surgery. Most recent hemoglobin and hematocrit are listed below.  Recent Labs     10/03/24  0304 10/04/24  0510   HGB 9.2* 8.3*   HCT 27.2* 24.4*       Plan  - Patient's anemia is currently stable and monitor. No indication for blood transfusion at this time.     Thrombocytosis-resolved as of 10/5/2024  - Resolved and back to normal range on 10/3.   - Improving with treatment of infection. Present on admit and related to infection causing increase in platelet count.       Endocrine  Class 1 obesity due to excess calories with serious comorbidity and body mass index (BMI) of 34.0 to 34.9 in adult  Body mass index is 34.11 kg/m². Morbid obesity complicates all aspects of disease management from diagnostic modalities to treatment. Weight loss encouraged and health benefits explained to patient.         Uncontrolled type 2 diabetes mellitus with hyperglycemia  Patient's FSGs controlled. Endocrine consulted and managing patient's diabetes in hospital and appreciate assistance. Patient on insulin pump at [facility] but not in hospital and on basal/prandial insulin in hospital as per Endocrine. Appreciate Endocrine assistance on this case.   Patient switched back to his insulin pump on discharge to [facility] as recommended by Endocrine.   Last A1c reviewed-   Lab Results   Component Value Date    LABA1C 10.8 (H) 08/15/2016    HGBA1C 8.5 (H) 09/06/2024     - Monitor blood sugars with meals and at bedtime in hospital.  - Target blood sugars 140-180 in hospital.  - Diabetic diet.     Hyponatremia-resolved as of 9/21/2024  Resolved. Sodium back to normal at 140 on 9/20.   Hyponatremia is likely due to Dehydration/hypovolemia. The patient's most recent sodium results are listed below.  Recent Labs     10/03/24  0304 10/04/24  0510   * 136       Plan  - Correct  the sodium by 4-6mEq in 24 hours.   - Encourage oral intake.   - Monitor sodium Daily. Patient asymptomatic.   - Patient hyponatremia is stable.     GI  Gastroesophageal reflux disease without esophagitis  Controlled. Continue Carafate every 6 hours po to treat and Protonix 40 mg po daily to treat on discharge.       Transaminitis-resolved as of 9/21/2024  Resolved. Truvada on hold as concern possible cause of elevated AST and ALT note don labs. AST and ALT back to normal off Truvada and continue to hold.     Constipation-resolved as of 9/19/2024  Resolve don discharge.       Oropharyngeal dysphagia-resolved as of 9/20/2024  Resolved. Patient with issues with coughing and swallowing so Speech therapy consulted for evaluation. Patient evaluated and monitored and improved. Initially placed on soft diet but on 9/18 upgraded to regular diet with thin liquids as per Speech. Dysphagia resolved.       Orthopedic  * Surgical wound breakdown  Closed trimalleolar fracture of right ankle s/p ORIF in 7/2024  Surgical site infection  65 yo male presenting with confusion and worsening redness, swelling and pain to right ankle. Patient with sepsis criteria on admit and right ankle wound felt to be source of infection.   - Ortho consulted and patient taken to OR 9/06/2024 for debridement of surgical wound with wound vac placed. Cultures taken and grew both MSSA and Group B streptococcus from wound. Patient taken back again to OR with Ortho with Dr. Liz and patient had another I&Dand wound vac change on 9/9/2024. Orthopedics took back to OR again on 9/20/2024 with Dr. Liz and underwent another I&D of right ankle and wound and replacement of wound vac. Patient taken back to OR with OR again on 9/23 with Dr. Liz and had another I&D and wound vac change with Orthopedics. Ortho noted anterolateral foot eschar developing. Distal lateral wound breakdown and ortho opened up majority of lateral wound and excised surrounding skin  and subcutaneous tissue. Ortho removed all ankle hardware. Wound vacs placed medially and laterally.   - Orthopedics recommended Plastics evaluation for free flap and as part of evaluation recommended Vascular evaluation. Vascular evaluated patient and angiogram of right leg done and PTA done and revascularization of right PT/AT. Plastics plan propeller flap on 9/25/2024 but unfortunately due to presence of lateral and anterior wounds to right ankle no longer candidate for propeller flap so now will need free flap to cover both wounds. Plastics plans to take to OR on 9/26 for free flap to right ankle wounds. Plastics reports if unable to do free flap then patient's only option will be a BKA. Patient taken to OR on 9/26 with Plastics and in OR for 8 hours with multiple attempts at targets for free flap at least 3 times but was not successful unfortunately only option is BKA.  - Patient taken to the OR on 10/1/2024 with Dr. Moe Liz and underwent right BKA.   - Wound cultures -- MSSA and Group B strep from right ankle.   - Blood cultures -- MSSA on admit but repeat blood cultures negative.   - ID consulted and following and appreciate recs: Continue IV Oxacillin 12 g every 24 hours continuous infusion  - ID re consulted after right BKA for new recommendations. Final ID recs noted:  Continue IV oxacillin 12 g q 24 hours continuous infusion until 10/20. This will complete 6 weeks of IV antibiotics from first negative blood culture - a sufficient duration to treat endocarditis if present. No need for ROBERTA. This will also complete the 14 days of post-BKA antibiotics recommended by Orthopedics.    Accepting facility to perform weekly labs (CBC,CMP,CRP), and PICC line dressing changes.  Facility to fax results to [facility] Fax Number: [phone].   ID will schedule pt for ID clinic f/u appt after completion of Rehab. If ID appt not seen under patient's appointments tab, please call ID dept to schedule appt before  patient discharge.  Order for PICC line placed on 10/3.   - Pain controlled and continue multimodals and continue on discharge.  - Continue Apixiban 2.5 mg for BID for DVT prophylaxis and continue on discharge.   - Ortho managing BKA site and Prevena wound vac in place and will continue Prevena wound vac on discharge to [facility].  - PT/OT consulted after BKA again and recommended high intensity. IP rehab referral sent and patient accepted to [facility] which was his first choice and patient agreeable. Auth sent and obtained and plan discharge for 10/4. Patient aware and agreeable to discharge plan. Patient discharged in good condition to [facility] on 10/4.       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] 09/13/2024 Yes    Acute blood loss anemia [D62] 09/12/2024 No    PAD (peripheral artery disease) [I73.9] 09/12/2024 Yes    On pre-exposure prophylaxis for HIV [Z79.899] 09/07/2024 Not Applicable    Class 1 obesity due to excess calories with serious comorbidity and body mass index (BMI) of 34.0 to 34.9 in adult [E66.811, E66.09, Z68.34] 03/16/2022 Not Applicable    Pure hypercholesterolemia [E78.00] 12/18/2012 Yes    Surgical site infection [T81.49XA] 09/06/2024 Yes    Closed trimalleolar fracture of right ankle [S82.851A] 07/18/2024 Yes    Anxiety [F41.9] 12/18/2012 Yes      Problems Resolved During this Admission:    Diagnosis Date Noted Date Resolved POA    MSSA bacteremia [R78.81, B95.61] 09/07/2024 10/04/2024 Yes    Acute renal failure with tubular necrosis [N17.0] 10/30/2016 09/21/2024 Yes    Hypokalemia [E87.6] 11/02/2016 09/23/2024 Yes    Hyperphosphatemia [E83.39] 09/14/2024 09/19/2024 No    Oropharyngeal dysphagia  [R13.12] 09/13/2024 09/20/2024 No    Constipation [K59.00] 09/13/2024 09/19/2024 No    Acute retention of urine [R33.8] 09/12/2024 09/19/2024 No    Metabolic acidosis [E87.20] 02/11/2015 10/03/2024 Yes    Acute hypoxemic respiratory failure [J96.01] 02/18/2015 09/21/2024 Yes    Hyponatremia [E87.1] 07/18/2024 09/21/2024 Yes    Transaminitis [R74.01] 09/09/2024 09/21/2024 Yes    Sinus tachycardia [R00.0] 09/12/2024 09/19/2024 Yes    Thrombocytosis [D75.839] 09/09/2024 10/05/2024 Yes    Bacteriuria [R82.71] 09/13/2024 09/18/2024 No    Leukocytosis [D72.829] 09/12/2024 09/18/2024 Yes    Acute metabolic encephalopathy [G93.41] 09/12/2024 09/18/2024 No    Cough [R05.9] 09/11/2024 09/18/2024 Yes    Sepsis [A41.9] 09/06/2024 09/18/2024 Yes    Hypertension, essential [I10] 06/19/2023 09/24/2024 Yes       Discharged Condition: good    Disposition: Rehab Facility ([facility])    Follow Up:    Future Appointments   Date Time Provider Department Center   10/8/2024 12:00 PM Alisa Prater PA-C [facility] ORTHO [place]   10/15/2024  9:30 AM Alisa Prater PA-C [facility]TH[facility] [address]   10/22/2024 10:00 AM Toya Carlin APRN, SYLVIA [facility] ID [place]   10/29/2024  1:00 PM Andrew Daigle MD [facility] CARDIO [place]   11/12/2024  9:45 AM Alisa Prater PA-C [facility] ORTHO [place]       Patient Instructions:      Diet diabetic     Notify your health care provider if you experience any of the following:  temperature >100.4     Notify your health care provider if you experience any of the following:  persistent nausea and vomiting or diarrhea     Notify your health care provider if you experience any of the following:  severe uncontrolled pain     Notify your health care provider if you experience any of the following:  redness, tenderness, or signs of infection (pain, swelling, redness, odor or green/yellow discharge around incision site)     Notify your health care provider if you experience any of the following:   difficulty breathing or increased cough     Notify your health care provider if you experience any of the following:  severe persistent headache     Notify your health care provider if you experience any of the following:  worsening rash     Notify your health care provider if you experience any of the following:  persistent dizziness, light-headedness, or visual disturbances     Notify your health care provider if you experience any of the following:  increased confusion or weakness     Weight bearing restrictions (specify):   Order Comments: Non weight bearing to right lower extremity       Significant Diagnostic Studies: Labs: CMP   Recent Labs   Lab 10/04/24  0510      K 3.9      CO2 20*   *   BUN 19   CREATININE 1.1   CALCIUM 7.7*   PROT 5.1*   ALBUMIN 1.4*   BILITOT 0.3   ALKPHOS 121   AST 15   ALT 11   ANIONGAP 7*    and CBC   Recent Labs   Lab 10/04/24  0510   WBC 10.09   HGB 8.3*   HCT 24.4*   *     Aerobic Bacterial Culture   Date Value Ref Range Status   09/06/2024 STAPHYLOCOCCUS AUREUS  Many   (A)  Final   09/06/2024 (A)  Final    STREPTOCOCCUS AGALACTIAE (GROUP B)  Many  Beta-hemolytic streptococci are routinely susceptible to   penicillins,cephalosporins and carbapenems.       Blood Culture, Routine   Date Value Ref Range Status   09/10/2024 No growth after 5 days.  Final   09/08/2024 No growth after 5 days.  Final   09/08/2024 No growth after 5 days.  Final   09/07/2024   Final    Gram stain aer bottle: Gram positive cocci in clusters resembling Staph   09/07/2024   Final    Gram stain alicia bottle: Gram positive cocci in clusters resembling Staph   09/07/2024   Final    Results called to and read back by:Luciana Altamirano LPN 09/08/2024 09/07/2024 03:59  Final   09/07/2024 (A)  Final    STAPHYLOCOCCUS AUREUS  ID consult required at [facility] [place].Lizy,Adriana and Brittany claudio.  For susceptibility see order #[id number]     09/07/2024   Final    Gram stain aer bottle: Gram  positive cocci in clusters resembling Staph   09/07/2024   Final    Results called to and read back by: Roseanne Guevara RN 09/08/2024  14:07   09/07/2024   Final    Gram stain alicia bottle: Gram positive cocci in clusters resembling Staph   09/07/2024 Positive results previously called 09/08/2024  Final   09/07/2024 (A)  Final    STAPHYLOCOCCUS AUREUS  ID consult required at [facility] [facility].  For susceptibility see order #[id number]         Pending Diagnostic Studies:       None           Medications:  Reconciled Home Medications:      Medication List        START taking these medications      0.9% NaCl SolP 500 mL with oxacillin 1 gram SolR 12 g  Inject 12 g into the vein once daily. End date 10/20/2024.     amLODIPine 10 MG tablet  Commonly known as: NORVASC  Take 1 tablet (10 mg total) by mouth once daily.     apixaban 2.5 mg Tab  Commonly known as: ELIQUIS  Take 1 tablet (2.5 mg total) by mouth 2 (two) times daily.     calcium carbonate 200 mg calcium (500 mg) chewable tablet  Commonly known as: TUMS  Take 2 tablets (1,000 mg total) by mouth 3 (three) times daily as needed for Heartburn.     furosemide 40 MG tablet  Commonly known as: LASIX  Take 1 tablet (40 mg total) by mouth once daily.     hydrALAZINE 50 MG tablet  Commonly known as: APRESOLINE  Take 1 tablet (50 mg total) by mouth every 8 (eight) hours.     pantoprazole 40 MG tablet  Commonly known as: PROTONIX  Take 1 tablet (40 mg total) by mouth once daily.     senna 8.6 mg tablet  Commonly known as: SENOKOT  Take 1 tablet by mouth 2 (two) times a day.     sucralfate 100 mg/mL suspension  Commonly known as: CARAFATE  Take 10 mLs (1 g total) by mouth every 6 (six) hours.     traMADoL 50 mg tablet  Commonly known as: ULTRAM  Take 1 tablet (50 mg total) by mouth every 6 (six) hours as needed (Moderate pain 4-6/10).     traZODone 100 MG tablet  Commonly known as: DESYREL  Take 1 tablet (100 mg total) by mouth every evening.             CHANGE how you take these medications      acetaminophen 500 MG tablet  Commonly known as: TYLENOL  Take 2 tablets (1,000 mg total) by mouth every 8 (eight) hours.  What changed:   medication strength  how much to take  when to take this     aspirin 81 MG EC tablet  Commonly known as: ECOTRIN  Take 1 tablet (81 mg total) by mouth once daily.  What changed:   when to take this  additional instructions     gabapentin 300 MG capsule  Commonly known as: NEURONTIN  Take 1 capsule (300 mg total) by mouth 2 (two) times daily.  What changed: See the new instructions.     losartan 25 MG tablet  Commonly known as: COZAAR  Take 2 tablets (50 mg total) by mouth once daily.  What changed: how much to take     methocarbamoL 500 MG Tab  Commonly known as: ROBAXIN  Take 1 tablet (500 mg total) by mouth every 8 (eight) hours.  What changed:   when to take this  reasons to take this     morphine 15 MG tablet  Commonly known as: MSIR  Take 1 tablet (15 mg total) by mouth every 4 (four) hours as needed (Severe pain 7-10/10).  What changed: reasons to take this     vitamin D 1000 units Tab  Commonly known as: VITAMIN D3  Take 1 tablet (1,000 Units total) by mouth once daily.  What changed: when to take this            CONTINUE taking these medications      atorvastatin 40 MG tablet  Commonly known as: LIPITOR  Take 40 mg by mouth once daily.     celecoxib 200 MG capsule  Commonly known as: CeleBREX  Take 1 capsule (200 mg total) by mouth once daily.     cyanocobalamin 1000 MCG tablet  Commonly known as: VITAMIN B-12  Take 1,000 mcg by mouth once daily.     DEXCOM G6 SENSOR Omaira  Generic drug: blood-glucose sensor  Change sensor every 10 days     DEXCOM G6 TRANSMITTER Omaira  Generic drug: blood-glucose transmitter  Change transmitter every 3 months     DULoxetine 60 MG capsule  Commonly known as: CYMBALTA  Take 1 capsule (60 mg total) by mouth once daily.     emtricitabine-tenofovir 200-300 mg 200-300 mg Tab  Commonly known as:  TRUVADA  Take 1 tablet by mouth once daily.     fish oil-omega-3 fatty acids 300-1,000 mg capsule  Take 1 capsule by mouth 2 (two) times daily.     HumaLOG U-100 Insulin 100 unit/mL injection  Generic drug: insulin lispro  1:20 U PRN high blood sugar and snacks. Correction dose- Enter carb coverage intake upon administration  Target number 120 Carbohydrate coverage #1 1:2 Carbohydrate coverage #1 time [id number] Carbohydrate coverage #2 1:3 Carbohydrate coverage #2 time [id number] Carbohydrate coverage #3 Carbohydrate coverage #3 time Carbohydrate coverage #4 Carbohydrate coverage #4 time Sensitivity #1 1:20 Sensitivity #1 time 7484-4588 Sensitivity #2 Sensitivity #2 time Sensitivity #3 Sensitivity #3 time Sensitivity #4 Sensitivity #4 time Sensitivity #5 Sensitivity #5 time Order Questions Question Answer Comment       50 U SQ continuous  Target number 120 Basal Rate #1 2.3 Basal rate #1 time [id number] Basal Rate #2 1.55 Basal rate #2 time 1997-4496 Basal rate #3 Basal rate #3 time Basal rate #4 Basal rate #4 time Basal rate #5 Basal rate #5 time     magnesium oxide 400 mg (241.3 mg magnesium) tablet  Commonly known as: MAG-OX  Take 0.5 tablets (200 mg total) by mouth once daily.     melatonin 3 mg tablet  Commonly known as: MELATIN  Take 2 tablets (6 mg total) by mouth nightly as needed for Insomnia.     MOUNJARO 10 mg/0.5 mL Pnij  Generic drug: tirzepatide  Inject 10 mg into the skin once a week. HOLD until all surgeries completed and out of rehab     nortriptyline 50 MG capsule  Commonly known as: PAMELOR  Take 1 capsule (50 mg total) by mouth nightly.     ondansetron 4 MG Tbdl  Commonly known as: ZOFRAN-ODT  Take 2 tablets (8 mg total) by mouth every 6 (six) hours as needed (nausea).     polyethylene glycol 17 gram Pwpk  Commonly known as: GLYCOLAX  Take 17 g by mouth once daily.     senna-docusate 8.6-50 mg 8.6-50 mg per tablet  Commonly known as: PERICOLACE  Take 1 tablet by mouth 2 (two) times daily.             STOP taking these medications      LOVENOX 40 mg/0.4 mL Syrg  Generic drug: enoxaparin     rosuvastatin 10 MG tablet  Commonly known as: CRESTOR              Indwelling Lines/Drains at time of discharge:   Lines/Drains/Airways       Peripherally Inserted Central Catheter Line  Duration             PICC Double Lumen 10/03/24 1635 right basilic 2 days                    34 minutes of time spent on discharge, including examining the patient, providing discharge instructions, arranging follow-up and documentation.            Raisa Kearns MD  Department of [facility] Medicine  [facility] - Surgery

## 2024-10-06 NOTE — ASSESSMENT & PLAN NOTE
Patient's FSGs controlled. Endocrine consulted and managing patient's diabetes in hospital and appreciate assistance. Patient on insulin pump at [facility] but not in hospital and on basal/prandial insulin in hospital as per Endocrine. Appreciate Endocrine assistance on this case.   Patient switched back to his insulin pump on discharge to [facility] as recommended by Endocrine.   Last A1c reviewed-   Lab Results   Component Value Date    LABA1C 10.8 (H) 08/15/2016    HGBA1C 8.5 (H) 09/06/2024     - Monitor blood sugars with meals and at bedtime in hospital.  - Target blood sugars 140-180 in hospital.  - Diabetic diet.

## 2024-10-06 NOTE — ASSESSMENT & PLAN NOTE
Resolved. Improved with oral replacement. Potassium low at 3.4 on 9/21 and will replace with oral potassium. Patient's most recent potassium results are listed below.   Recent Labs     10/03/24  0304 10/04/24  0510   K 4.1 3.9       Plan  - Replete potassium per protocol  - Monitor potassium Daily

## 2024-10-06 NOTE — ASSESSMENT & PLAN NOTE
- Present on admit. Patient noted to have high grade stenosis on CTA of right leg. Vascular surgery consulted and patient taken to OR and had unilateral angiogram of right leg and had PT/AT artery stenosis on 9/17 and underwent PTA of right PT/AT arteries. Appreciate Vascular surgery assistance on case.   - Patient s/p free flap attempt with Plastics and did attempt donor flap from abdomen/flank area but unsuccessful so Orthopedics ended up doing right BKA as unable to cover wounds to right leg with free flap.   - Continue Aspirin and Lipitor daily to treat PAD on discharge.

## 2024-10-06 NOTE — ASSESSMENT & PLAN NOTE
Resolved. Patient with issues with coughing and swallowing so Speech therapy consulted for evaluation. Patient evaluated and monitored and improved. Initially placed on soft diet but on 9/18 upgraded to regular diet with thin liquids as per Speech. Dysphagia resolved.

## 2024-10-06 NOTE — ASSESSMENT & PLAN NOTE
Controlled. Continue Carafate every 6 hours po to treat and Protonix 40 mg po daily to treat on discharge.

## 2024-10-06 NOTE — ASSESSMENT & PLAN NOTE
Patient on Truvada as outpatient but held in hospital due to elevated AST and ALT that has resolved. Resume home Truvada on discharge.

## 2024-10-06 NOTE — ASSESSMENT & PLAN NOTE
- Controlled on discharge. No clinical signs of bleeding noted on discharge.   - Patient transfused 1 unit of PRBCs on 10/1 pre op for BKA as Hgb was 7.5 and 1 unit also transfused intra-op on 10/1 and Hgb improved to 10.2 on 10/2 and stable at 9.2 on 10/3.   - Patient transfused 2 units of PRBCs on 9/26 during free flap attempts by Plastics.  - No clinical signs of bleeding and will monitor.   - Anemia is likely due to acute blood loss from surgery. Most recent hemoglobin and hematocrit are listed below.  Recent Labs     10/03/24  0304 10/04/24  0510   HGB 9.2* 8.3*   HCT 27.2* 24.4*       Plan  - Patient's anemia is currently stable and monitor. No indication for blood transfusion at this time.

## 2024-10-06 NOTE — ASSESSMENT & PLAN NOTE
Resolved. DEEPALI is likely due to ATN from contrast in combination with sepsis. Baseline creatinine is  1.0-1.2 . Most recent creatinine and eGFR are listed below.  Recent Labs     10/03/24  0304 10/04/24  0510   CREATININE 0.9 1.1   EGFRNORACEVR >60.0 >60.0        Plan  - Avoid nephrotoxins and renally dose meds for GFR listed above  - Monitor urine output, serial BMP, and adjust therapy as needed  - Patient with worsening creatinine after contrast studies and Cr peaked at 3.3 on 9/14.  Creatinine   Date Value Ref Range Status   10/04/2024 1.1 0.5 - 1.4 mg/dL Final   10/03/2024 0.9 0.5 - 1.4 mg/dL Final   10/02/2024 0.9 0.5 - 1.4 mg/dL Final   10/01/2024 0.8 0.5 - 1.4 mg/dL Final   09/30/2024 0.9 0.5 - 1.4 mg/dL Final     - Renal consulted. ATN noted on urine microscopy with casts, secondary to sepsis/contrast likely combination and recommended supportive care.   - Creatinine improved with supportive care and now back down to 1.2 on 9/19.   - Renally dose medications.   - Monitor daily BMP.

## 2024-10-06 NOTE — ASSESSMENT & PLAN NOTE
Closed trimalleolar fracture of right ankle s/p ORIF in 7/2024  Surgical site infection  63 yo male presenting with confusion and worsening redness, swelling and pain to right ankle. Patient with sepsis criteria on admit and right ankle wound felt to be source of infection.   - Ortho consulted and patient taken to OR 9/06/2024 for debridement of surgical wound with wound vac placed. Cultures taken and grew both MSSA and Group B streptococcus from wound. Patient taken back again to OR with Ortho with Dr. Liz and patient had another I&Dand wound vac change on 9/9/2024. Orthopedics took back to OR again on 9/20/2024 with Dr. Liz and underwent another I&D of right ankle and wound and replacement of wound vac. Patient taken back to OR with OR again on 9/23 with Dr. Liz and had another I&D and wound vac change with Orthopedics. Ortho noted anterolateral foot eschar developing. Distal lateral wound breakdown and ortho opened up majority of lateral wound and excised surrounding skin and subcutaneous tissue. Ortho removed all ankle hardware. Wound vacs placed medially and laterally.   - Orthopedics recommended Plastics evaluation for free flap and as part of evaluation recommended Vascular evaluation. Vascular evaluated patient and angiogram of right leg done and PTA done and revascularization of right PT/AT. Plastics plan propeller flap on 9/25/2024 but unfortunately due to presence of lateral and anterior wounds to right ankle no longer candidate for propeller flap so now will need free flap to cover both wounds. Plastics plans to take to OR on 9/26 for free flap to right ankle wounds. Plastics reports if unable to do free flap then patient's only option will be a BKA. Patient taken to OR on 9/26 with Plastics and in OR for 8 hours with multiple attempts at targets for free flap at least 3 times but was not successful unfortunately only option is BKA.  - Patient taken to the OR on 10/1/2024 with Dr. Rosa  Agusto and underwent right BKA.   - Wound cultures -- MSSA and Group B strep from right ankle.   - Blood cultures -- MSSA on admit but repeat blood cultures negative.   - ID consulted and following and appreciate recs: Continue IV Oxacillin 12 g every 24 hours continuous infusion  - ID re consulted after right BKA for new recommendations. Final ID recs noted:  Continue IV oxacillin 12 g q 24 hours continuous infusion until 10/20. This will complete 6 weeks of IV antibiotics from first negative blood culture - a sufficient duration to treat endocarditis if present. No need for ROBERTA. This will also complete the 14 days of post-BKA antibiotics recommended by Orthopedics.    Accepting facility to perform weekly labs (CBC,CMP,CRP), and PICC line dressing changes.  Facility to fax results to [facility] Fax Number: [phone].   ID will schedule pt for ID clinic f/u appt after completion of Rehab. If ID appt not seen under patient's appointments tab, please call ID dept to schedule appt before patient discharge.  Order for PICC line placed on 10/3.   - Pain controlled and continue multimodals and continue on discharge.  - Continue Apixiban 2.5 mg for BID for DVT prophylaxis and continue on discharge.   - Ortho managing BKA site and Prevena wound vac in place and will continue Prevena wound vac on discharge to [facility].  - PT/OT consulted after BKA again and recommended high intensity. IP rehab referral sent and patient accepted to [facility] which was his first choice and patient agreeable. Auth sent and obtained and plan discharge for 10/4. Patient aware and agreeable to discharge plan. Patient discharged in good condition to [facility] on 10/4.

## 2024-10-06 NOTE — ASSESSMENT & PLAN NOTE
- Resolved and back to normal range on 10/3.   - Improving with treatment of infection. Present on admit and related to infection causing increase in platelet count.

## 2024-10-07 LAB — FUNGUS SPEC CULT: NORMAL

## 2024-10-07 NOTE — OP NOTE
[facility] - Surgery  Plastic Surgery  Operative Note    SUMMARY     Date of Procedure: 9/26/2024     Location:  [facility] [address]    Procedure(s): Procedure(s) (LRB):  CREATION, FREE FLAP (Right)      Combined left scapular and parascapular fasciocutaneous free flap of the right lower extremity   Sharp excisional debridement of right lower extremity wound in preparation for flap  ICG angiography to assess flap perfusion   Negative pressure wound VAC placement, greater than 50 cm2    Surgeons and Role:     * Juanito Cueto DO - Primary     * Tonya Paniagua MD - Resident - Assisting     * Dario Rankin MD - Fellow     * Destin Enamorado MD - Fellow     * Kristian Silva MD - Fellow    Assistant: Dario Rankin DO, Fellow, Blake Enamorado MD, Fellow, and Kristian Silva MD, Fellow    Pre-Operative Diagnosis: Surgical site infection [T81.49XA]    Post-Operative Diagnosis: same    Anesthesia: General    Indications for Procedure:  this is a 64-year-old male with a history of a open fracture to the right ankle.  He had a nonhealing wound.  It was admitted to [facility] and underwent serial debridement with the hardware removal.  Initially planned for a propeller flap to a medial ankle wound.  He had significant peripheral vascular disease and underwent angioplasty.  In the interim he then developed full-thickness necrosis of his lateral ankle wound as well.  He now has a open wounds of the medial and lateral ankle.  This change was planned to free flap reconstruction.  He was a longstanding poorly-controlled diabetes and it was known peripheral vascular disease.  I discussed with him preoperatively the high risk of failure.  It was more optimistic for only a propeller flap but now he had bone exposed on both sides of his ankle.  I discussed the high risk of flap failure with his diseased vessels.  In the event of flap failure a below-knee amputation would be recommended    Operative Findings (including  complications, if any):  a combined scap and ashley-scap fasciocutaneous flap was elevated on a single .  Pinch test was used to determine likelihood of being able to close the flap.  Both wounds were debrided in anticipation of the flap.  He laterally had fibula bone exposed with the ankle.  Medially he had tibia and the tibiotalar joint exposed.  End-to-side anastomosis was performed.  Had significant atherosclerotic disease and diseased intima.  It was very brittle.  The 1st anastomosis thrombosed.  I then decided to perform an end-to-end anastomosis.  We cut more proximally on the vessel and also had significantly diseased vessel.  Dissected more and more proximal but could not find a healthy area as a target for the anastomosis and so the flap was abandoned and a wound VAC was placed.  Incisional wound VAC was placed over the flap donor site    Description of Procedures:  patient was here the preoperative holding area.  Discussed the risk of limb loss in the vena flap failure.  Consents were signed.  All questions were answered.  He was taken to the operating room general anesthesia was induced.  He was placed in the right lateral decubitus position.  The skin us access to the left back in the right medial ankle.  Using color-flow Doppler ultrasound the parascapular  was identified.  Using a pinch test a horizontal and vertical pattern were designed according to the combined scapular and parascapular fasciocutaneous free flaps.  Both the left back and the right lower leg were prepped and draped in a sterile fashion.    The recipient vessels were prepared.  Using a 15 blade the entire wound bed was debrided.  It was significant fibrinous material of the wound bed it was excised sharply with a knife and scissors.  It was made hemostatic using the Bovie.  The wound edges were elevated circumferentially to aid in flap inset.  There was tendon, tibia, and the tibiotalar joint all open in the base  of this wound.  I then extended the incision obliquely up the leg.  It was dissected down through the subcutaneous fat through the deep fascia of the leg.  The tibial artery and associated vena comitans were identified.  It was 2 vena comitans with the posterior tibial artery.  There were some intermittent firm areas, the just above the wound there was a soft area the artery.  This is skeletonized for several cm.  The associated veins were also skeletonized.  The posterior tibial nerve was identified and protected.  Moistened Ray-Norma and 4% lidocaine was then placed over the vessels.    Of the upper back the upper border of the parascapular flap was incised.  From the midline of the back laterally elevated just above the fascia of the back.  Once I reached the teres muscles we included the fascia and deeper.  Using the pencil Doppler we are able to identify position of this large .  I then elevated the flap from medial to lateral.  The  was addition was definitively identified.  We continued the dissection down until the junction with the thoracodorsal artery and vein.  It was about 6 cm pedicle length.  Next the vertically oriented skin paddle was incised.  It was then elevated off of the deep fascia up to the pedicle until the flap was completely freed on the pedicle.  The flap was then temporarily stapled in the back.    Next ICG angiography was performed.  4 cc of reconstituted ICG was injected.  It was initially some slow filling of the flap.  However at 90 seconds, the entirety of the horizontal pattern and all of the vertical pattern accept the distal few cm was well perfused the ICG angiogram.  The foot was also currently well-perfused.  I marked the area of the distal flap to be excised and then it was done.       Next the flap was harvested.  Clips were placed on the origin of the pedicle.  It was double checked for hemostasis.    The donor site was closed.  The back was undermined  medially.  It was under some tension.  A 15 Argentine Wilbert drain was placed under the donor site.  Penetrating towel clamps were used to temporarily hold the skin in place.  The donor site was then closed with buried Vicryl sutures, that it was followed by interrupted 3-0 PDS in the deep dermis and skin staples.  An incisional wound VAC was used to offload tension.  The drain was placed to suction.    The flap was then temporarily stapled and a warm lap pad to the lower leg and foot.  The operating microscope was brought in.  Chose to perform the deepest venous anastomosis 1st, followed by the artery and then the 2nd vena comitans.  A 2 mm  was used for the initial venous anastomosis.  The pedicle vessels were cleaned under the microscope.  The proximal vein was divided with a single green and distally a clip was placed.  A 2 mm  to be appropriate used for an end-to-end anastomosis.  Next the arterial anastomosis was performed.  Posterior tibial artery was occluded  proximally and distally the single green approximating clamp.  A single suture was thrown into the sidewall and a soft area.  There were some firm areas of the vessel that could be palpated.  Using sharp adventitial scissors an arteriotomy was placed in the site of the vessel.  The patient had thickened intima that was easily falling away from the media.  An end-to-side anastomosis using interrupted 9 0 nylon sutures and a slightly larger needle.  Every suture was placed so it was tacking up the intima of the posterior tibial artery.  It was initially strong palpable flow in the flap and back bleeding through both veins.  Finally the 2nd more proximal venous anastomosis was done to vena comitans and the pedicle vein,  this time with a 3 mm .  Before the flap was able to be inset the pulse was lost in the pedicle.  The anastomosis was examined.  There appeared to be thrombus of the anastomosis.  It was flushed few sutures were placed  but unfortunately it occluded again.      Because of the diseased vessel I did not think an end to side anastomosis would be possible.  Despite the risk of poor perfusion to his foot I chose to perform an end-to-end anastomosis.  Just proximal to the end-to-side anastomosis of the artery was divided.  Again there was thickened frail intima it easily torn longitudinally and I can.  This time an end-to-end anastomosis was performed using interrupted 9 0 nylon sutures.  After the clamps were removed and also shortly thrombosed.    I then dissected proximally along the posterior tibial artery.  It was divided in 2-3 other areas that felt soft.  It was sewed them were some hard calcified areas.  When I divided in the softer areas that it was a longitudinal split in the intima the think propagated a fair way down the vessel.  It was also very fragile thickened intima.   Finally decided that it was not a suitable target forearm night micro anastomosis and the free flap was aborted.  The proximal incision of the leg was temporarily stapled closed.      Black sponge wound VAC was then placed over the medial and lateral ankle wounds and connected with a single obey pad.  He was placed back in his splint.  Both wound vacs in the back and the ankle were able to hold suction.  Patient tolerated the procedure well taken back to recovery in stable condition    Significant Surgical Tasks Conducted by the Assistant(s), if Applicable: The indicated resident served as first assistant for this procedure.    Estimated Blood Loss (EBL):  200 mL           Implants:   Implant Name Type Inv. Item Serial No.  Lot No. LRB No. Used Action    CmyCasaAS ANSTMS 3.0MM - [id number]   Digital [employer]chid ANSTMS 3.0MM  [employer] [id number] Right 1 Implanted    2.0MM - [id number]   2.0MM  [employer] [id number] Right 1 Implanted       Specimens:   Specimen (24h ago, onward)      None                     Condition: Good    Disposition: PACU - hemodynamically stable.,  returned to the floor.  Below-knee amputation it was recommended    Attestation: I was present and scrubbed for the key portions of the procedure.

## 2024-10-08 NOTE — PLAN OF CARE
[facility] - [facility]  Discharge Final Note    Primary Care Provider: Andrew Sinclair Jr., MD    Expected Discharge Date: 10/4/2024    Final Discharge Note (most recent)       Final Note - 10/04/24 1445          Final Note    Assessment Type Final Discharge Note     Anticipated Discharge Disposition Rehab Facility   [facility].    Hospital Resources/Appts/Education Provided Provided patient/caregiver with written discharge plan information;Provided education on problems/symptoms using teachback                   Future Appointments   Date Time Provider Department Center   10/15/2024  9:30 AM Alisa Prater PA-C [facility] ORTHO [facility]   10/22/2024 10:00 AM Toya Carlin APRN, ANP NOM ID [facility]   10/29/2024  1:00 PM Andrew Daigle MD [facility] CARDIO [facility]   11/12/2024  9:45 AM Alisa Prater PA-C [facility]THO [facility]

## 2024-10-15 ENCOUNTER — TELEPHONE (OUTPATIENT)
Dept: ORTHOPEDICS | Facility: CLINIC | Age: 64
End: 2024-10-15

## 2024-10-15 NOTE — TELEPHONE ENCOUNTER
Left message for patient to return call.  Regarding missed appointment on 10/15/24. Pt need to be reschedule.

## 2024-10-17 ENCOUNTER — TELEPHONE (OUTPATIENT)
Dept: INFECTIOUS DISEASES | Facility: CLINIC | Age: 64
End: 2024-10-17
Payer: COMMERCIAL

## 2024-10-17 ENCOUNTER — HOSPITAL ENCOUNTER (OUTPATIENT)
Dept: RADIOLOGY | Facility: HOSPITAL | Age: 64
Discharge: HOME | End: 2024-10-17
Attending: PHYSICAL MEDICINE & REHABILITATION
Payer: COMMERCIAL

## 2024-10-17 PROCEDURE — 74018 RADEX ABDOMEN 1 VIEW: CPT | Mod: 26,,, | Performed by: INTERNAL MEDICINE

## 2024-10-18 ENCOUNTER — TELEPHONE (OUTPATIENT)
Dept: CARDIOLOGY | Facility: CLINIC | Age: 64
End: 2024-10-18
Payer: COMMERCIAL

## 2024-10-18 NOTE — TELEPHONE ENCOUNTER
----- Message from Terrnece sent at 10/18/2024  3:08 PM CDT -----  Juhi maddox [facility] is calling in regards to patient appointment on 10-29-24. She would like it to be a virtual visit. She can be reached at [phone].    Thank

## 2024-10-18 NOTE — TELEPHONE ENCOUNTER
Patient will be in the [facility] facility at the time of his 10/29 appt.  Appt changed from in person to virtual.

## 2024-10-21 ENCOUNTER — TELEPHONE (OUTPATIENT)
Dept: PLASTIC SURGERY | Facility: CLINIC | Age: 64
End: 2024-10-21
Payer: COMMERCIAL

## 2024-10-21 NOTE — TELEPHONE ENCOUNTER
Called [facility] back from in basket message - pt scheduled for a follow up tp see Dr ritchie since pt discharged today- advised will call pt with appt - all questions and concerns addressed

## 2024-10-21 NOTE — TELEPHONE ENCOUNTER
Spoke to pt and scheduled f/u to see Dr Cueto on 02/28/23 2pm at [facility], 2nd floor. [address].      Provided patient with appointment time and date, address of the location and call back # to RN navigator. All questions and concerns addressed. Pt verbalized understanding.

## 2024-10-22 ENCOUNTER — OFFICE VISIT (OUTPATIENT)
Dept: INFECTIOUS DISEASES | Facility: CLINIC | Age: 64
End: 2024-10-22
Payer: COMMERCIAL

## 2024-10-22 DIAGNOSIS — T81.49XA SURGICAL SITE INFECTION: Primary | ICD-10-CM

## 2024-10-22 DIAGNOSIS — Z89.512 S/P BKA (BELOW KNEE AMPUTATION), LEFT: ICD-10-CM

## 2024-10-22 DIAGNOSIS — B95.61 MSSA BACTEREMIA: ICD-10-CM

## 2024-10-22 DIAGNOSIS — R78.81 MSSA BACTEREMIA: ICD-10-CM

## 2024-10-22 PROCEDURE — 3052F HG A1C>EQUAL 8.0%<EQUAL 9.0%: CPT | Mod: CPTII,95,, | Performed by: NURSE PRACTITIONER

## 2024-10-22 PROCEDURE — 1111F DSCHRG MED/CURRENT MED MERGE: CPT | Mod: CPTII,95,, | Performed by: NURSE PRACTITIONER

## 2024-10-22 PROCEDURE — 4010F ACE/ARB THERAPY RXD/TAKEN: CPT | Mod: CPTII,95,, | Performed by: NURSE PRACTITIONER

## 2024-10-22 PROCEDURE — 99213 OFFICE O/P EST LOW 20 MIN: CPT | Mod: 95,,, | Performed by: NURSE PRACTITIONER

## 2024-10-22 NOTE — PROGRESS NOTES
Subjective:      Patient ID: Cortes Schroeder is a 64 y.o. male.    Chief Complaint:Follow-up (Ankle hardware infection sp BKA, MSSA bacteremia)      The patient location is: Home,   The chief complaint leading to consultation is: Hospital follow up MSSA left ankle hardware infection and bacteremia, s/p BKA 10/1    Visit type: audiovisual    Face to Face time with patient: 11 min  20 minutes of total time spent on the encounter, which includes face to face time and non-face to face time preparing to see the patient (eg, review of tests), Obtaining and/or reviewing separately obtained history, Documenting clinical information in the electronic or other health record, Independently interpreting results (not separately reported) and communicating results to the patient/family/caregiver, or Care coordination (not separately reported).         Each patient to whom he or she provides medical services by telemedicine is:  (1) informed of the relationship between the physician and patient and the respective role of any other health care provider with respect to management of the patient; and (2) notified that he or she may decline to receive medical services by telemedicine and may withdraw from such care at any time.    Notes:   HPI     Follow-up     Additional comments: Ankle hardware infection sp BKA, MSSA bacteremia          Last edited by Toya Carlin, APRN, ANP on 10/23/2024  6:46 AM.      Mr. Schroeder is seen today by virtual visit  for hospital  follow up of left ankle hardware infection and MSSA bacteremia, s/p BKA 10/1.     In review, he is a 64 year old with poorly controlled DM (A1C 8.5), fall in July resulting in left wrist and right ankle fractures 07/18/24 s/p ex fix Rt ankle and ORIF left wrist 07/19, with subsequent ORIF right ankle on 7/26/24 with Dr. Liz,  c/b MSSA deep surgical site infection of right ankle involving underlying hardware with associated MSSA bacteremia,  s/p I&D on 09/06 and 9/9.   Upon writer's assessment of patient this morning, she complains of numbness and tingling in her left hand to elbow. Upon assessing her  strength, she is unable to tightly  my two fingers with her left hand. She feels she has decreased  in that hand as well. She does have less than 3 seconds of capillary refill to all nailbeds on her left hand, but patient was unable to feel palpation of her nail beds on her thumb, pointer and middle finger on her left side. She has nonpitting swelling in hands bilaterally. She continues to have \"achy\" biceps bilaterally but improved since yesterday. She feels she has full movement of her arms. She also complains of continued blurred vision in bilateral eyes. She states that yesterday in recovery she \"couldn't see what my nurse looked like\" and this morning the menu is blurry and she couldn't read it and the patient report board in the room is blurry. She has a constant headache since recovery yesterday in the front of her head to her temples bilaterally. She denies shortness of breath or chest pain, but has inspiratory rhonchi  on upper and lower lobes on the left posterior and right lower lobe posterior. She has imprinted lines around her legs underneath bilateral knees with small reddened raised areas in the inside of her knee bend bilaterally which are believed to be from her porter hose from surgery yesterday. Pt's wife reports that it looks much improved from yesterday. She has non pitting swelling in her ankles. Wound sites look appropriate with steristrips in place and bruising to the left 3 incisions. Pain 6/10, controlled with pain medication.    Blood cultures 9/6 and 9/7 + for MSSA.  Blood cultures cleared 9/8.  TTE negative.   Surgical cxs +GBS, MSSA.   On 09/17 underwent angiogram with PTA and wound vac exchange. Further I&D with wound vac exchange 09/20 and 09/23 with removal of all hardware.  Attempted parascapular flap by Plastic Surgery 9/26 with no inflow flap aborted.  Now s/p right BKA 10/1      Discharged to Rehab.  Pt was maintained on IV oxacillin continuous infusion through 10/20 which completed 6 weeks of IV antibiotics.  He was discharged from Rehab on 10/20.  Now at home.     Reports doing well.  Happy to be home.  Denies fevers, chills, sweats.  No n/v/d. Reports stump site healing.  Had some intermittent drainage from his left  back flap harvest site, which seems to have resolved now.  Has follow up with Plastic Surgery later this week.  No drains in place.  Not yet fitted for prosthesis.  Will we working with outpatient rehab         Review of Systems   Constitutional: Positive for malaise/fatigue.   Skin:         Left stump wound healing well   Right back surgical site with some isolated drainage now resolved.    Musculoskeletal:  Positive for joint pain.     Objective:   Physical Exam  Vitals reviewed.   Constitutional:       Appearance: Normal appearance. He is not ill-appearing.   HENT:      Head: Normocephalic and atraumatic.   Eyes:      General: No scleral icterus.     Conjunctiva/sclera: Conjunctivae normal.   Cardiovascular:      Rate and Rhythm: Normal rate.   Pulmonary:      Effort: Pulmonary effort is normal. No respiratory distress.   Skin:     General: Skin is dry.      Comments: See photos of surgical sites from Rehab 10/20 below   Neurological:      Mental Status: He is alert.   Psychiatric:         Behavior: Behavior normal.       Left BKA incision       Left back       Assessment:     1. Surgical site infection    2. MSSA bacteremia    3. S/P BKA (below knee amputation), left       MSSA bacteremia 2/2 left ankle  surgical site infection with infected hardware, now s/p BKA.  Completed 6 weeks of IV oxacillin. Doing well.  No local or systemic signs of infection  Plan:    1.  ID follow up as needed.    2.   Plastic Surgery follow up 10/25   3.   Orthopedic follow up as scheduled.   4.   Call with questions/concerns.  Has my contact information

## 2024-10-25 ENCOUNTER — OFFICE VISIT (OUTPATIENT)
Dept: PLASTIC SURGERY | Facility: CLINIC | Age: 64
End: 2024-10-25
Payer: COMMERCIAL

## 2024-10-25 ENCOUNTER — HOSPITAL ENCOUNTER (INPATIENT)
Facility: HOSPITAL | Age: 64
LOS: 3 days | Discharge: HOME | DRG: 902 | End: 2024-10-29
Attending: EMERGENCY MEDICINE | Admitting: STUDENT IN AN ORGANIZED HEALTH CARE EDUCATION/TRAINING PROGRAM
Payer: COMMERCIAL

## 2024-10-25 VITALS — DIASTOLIC BLOOD PRESSURE: 70 MMHG | HEART RATE: 81 BPM | SYSTOLIC BLOOD PRESSURE: 158 MMHG

## 2024-10-25 DIAGNOSIS — T81.30XA WOUND DEHISCENCE: ICD-10-CM

## 2024-10-25 DIAGNOSIS — T81.31XA SURGICAL WOUND BREAKDOWN: ICD-10-CM

## 2024-10-25 DIAGNOSIS — Z96.41 INSULIN PUMP STATUS: ICD-10-CM

## 2024-10-25 DIAGNOSIS — Z51.89 VISIT FOR WOUND CHECK: Primary | ICD-10-CM

## 2024-10-25 DIAGNOSIS — R07.9 CHEST PAIN: ICD-10-CM

## 2024-10-25 DIAGNOSIS — R73.9 HYPERGLYCEMIA: ICD-10-CM

## 2024-10-25 DIAGNOSIS — E11.65 UNCONTROLLED TYPE 2 DIABETES MELLITUS WITH HYPERGLYCEMIA: ICD-10-CM

## 2024-10-25 DIAGNOSIS — Z09 SURGERY FOLLOW-UP: Primary | ICD-10-CM

## 2024-10-25 DIAGNOSIS — T81.31XD DEHISCENCE OF OPERATIVE WOUND, SUBSEQUENT ENCOUNTER: ICD-10-CM

## 2024-10-25 LAB
ACID FAST MOD KINY STN SPEC: NORMAL
ALBUMIN SERPL BCP-MCNC: 2.7 G/DL (ref 3.5–5.2)
ALP SERPL-CCNC: 135 U/L (ref 40–150)
ALT SERPL W/O P-5'-P-CCNC: 10 U/L (ref 10–44)
ANION GAP SERPL CALC-SCNC: 11 MMOL/L (ref 8–16)
AST SERPL-CCNC: 16 U/L (ref 10–40)
BASOPHILS # BLD AUTO: 0.11 K/UL (ref 0–0.2)
BASOPHILS NFR BLD: 0.9 % (ref 0–1.9)
BILIRUB SERPL-MCNC: 0.2 MG/DL (ref 0.1–1)
BUN SERPL-MCNC: 20 MG/DL (ref 8–23)
BUN SERPL-MCNC: 26 MG/DL (ref 6–30)
CALCIUM SERPL-MCNC: 9.6 MG/DL (ref 8.7–10.5)
CHLORIDE SERPL-SCNC: 94 MMOL/L (ref 95–110)
CHLORIDE SERPL-SCNC: 96 MMOL/L (ref 95–110)
CO2 SERPL-SCNC: 26 MMOL/L (ref 23–29)
CREAT SERPL-MCNC: 0.9 MG/DL (ref 0.5–1.4)
CREAT SERPL-MCNC: 1 MG/DL (ref 0.5–1.4)
DIFFERENTIAL METHOD BLD: ABNORMAL
EOSINOPHIL # BLD AUTO: 0 K/UL (ref 0–0.5)
EOSINOPHIL NFR BLD: 0.3 % (ref 0–8)
ERYTHROCYTE [DISTWIDTH] IN BLOOD BY AUTOMATED COUNT: 15.1 % (ref 11.5–14.5)
EST. GFR  (NO RACE VARIABLE): >60 ML/MIN/1.73 M^2
GLUCOSE SERPL-MCNC: 267 MG/DL (ref 70–110)
GLUCOSE SERPL-MCNC: 270 MG/DL (ref 70–110)
HCT VFR BLD AUTO: 35.7 % (ref 40–54)
HCT VFR BLD CALC: 37 %PCV (ref 36–54)
HGB BLD-MCNC: 11.5 G/DL (ref 14–18)
IMM GRANULOCYTES # BLD AUTO: 0.07 K/UL (ref 0–0.04)
IMM GRANULOCYTES NFR BLD AUTO: 0.6 % (ref 0–0.5)
LACTATE SERPL-SCNC: 2 MMOL/L (ref 0.5–2.2)
LIPASE SERPL-CCNC: <4 U/L (ref 4–60)
LYMPHOCYTES # BLD AUTO: 1.3 K/UL (ref 1–4.8)
LYMPHOCYTES NFR BLD: 10.3 % (ref 18–48)
MCH RBC QN AUTO: 28.8 PG (ref 27–31)
MCHC RBC AUTO-ENTMCNC: 32.2 G/DL (ref 32–36)
MCV RBC AUTO: 89 FL (ref 82–98)
MONOCYTES # BLD AUTO: 0.9 K/UL (ref 0.3–1)
MONOCYTES NFR BLD: 7.4 % (ref 4–15)
MYCOBACTERIUM SPEC QL CULT: NORMAL
NEUTROPHILS # BLD AUTO: 10 K/UL (ref 1.8–7.7)
NEUTROPHILS NFR BLD: 80.5 % (ref 38–73)
NRBC BLD-RTO: 0 /100 WBC
PLATELET # BLD AUTO: 408 K/UL (ref 150–450)
PMV BLD AUTO: 9.7 FL (ref 9.2–12.9)
POC IONIZED CALCIUM: 1.16 MMOL/L (ref 1.06–1.42)
POC TCO2 (MEASURED): 30 MMOL/L (ref 23–29)
POCT GLUCOSE: 191 MG/DL (ref 70–110)
POTASSIUM BLD-SCNC: 5.1 MMOL/L (ref 3.5–5.1)
POTASSIUM SERPL-SCNC: 4.7 MMOL/L (ref 3.5–5.1)
PROT SERPL-MCNC: 7.6 G/DL (ref 6–8.4)
RBC # BLD AUTO: 4 M/UL (ref 4.6–6.2)
SAMPLE: ABNORMAL
SODIUM BLD-SCNC: 131 MMOL/L (ref 136–145)
SODIUM SERPL-SCNC: 133 MMOL/L (ref 136–145)
WBC # BLD AUTO: 12.4 K/UL (ref 3.9–12.7)

## 2024-10-25 PROCEDURE — 82962 GLUCOSE BLOOD TEST: CPT

## 2024-10-25 PROCEDURE — 83605 ASSAY OF LACTIC ACID: CPT | Performed by: EMERGENCY MEDICINE

## 2024-10-25 PROCEDURE — 85025 COMPLETE CBC W/AUTO DIFF WBC: CPT | Performed by: EMERGENCY MEDICINE

## 2024-10-25 PROCEDURE — 99999 PR PBB SHADOW E&M-EST. PATIENT-LVL IV: CPT | Mod: PBBFAC,,, | Performed by: PHYSICIAN ASSISTANT

## 2024-10-25 PROCEDURE — 99285 EMERGENCY DEPT VISIT HI MDM: CPT | Mod: 25

## 2024-10-25 PROCEDURE — G0378 HOSPITAL OBSERVATION PER HR: HCPCS

## 2024-10-25 PROCEDURE — 99024 POSTOP FOLLOW-UP VISIT: CPT | Mod: S$GLB,,, | Performed by: PHYSICIAN ASSISTANT

## 2024-10-25 PROCEDURE — 3078F DIAST BP <80 MM HG: CPT | Mod: CPTII,S$GLB,, | Performed by: PHYSICIAN ASSISTANT

## 2024-10-25 PROCEDURE — 25000003 PHARM REV CODE 250

## 2024-10-25 PROCEDURE — 83690 ASSAY OF LIPASE: CPT | Performed by: EMERGENCY MEDICINE

## 2024-10-25 PROCEDURE — 3052F HG A1C>EQUAL 8.0%<EQUAL 9.0%: CPT | Mod: CPTII,S$GLB,, | Performed by: PHYSICIAN ASSISTANT

## 2024-10-25 PROCEDURE — 4010F ACE/ARB THERAPY RXD/TAKEN: CPT | Mod: CPTII,S$GLB,, | Performed by: PHYSICIAN ASSISTANT

## 2024-10-25 PROCEDURE — 25500020 PHARM REV CODE 255: Performed by: EMERGENCY MEDICINE

## 2024-10-25 PROCEDURE — 3077F SYST BP >= 140 MM HG: CPT | Mod: CPTII,S$GLB,, | Performed by: PHYSICIAN ASSISTANT

## 2024-10-25 PROCEDURE — 63600175 PHARM REV CODE 636 W HCPCS

## 2024-10-25 PROCEDURE — 96372 THER/PROPH/DIAG INJ SC/IM: CPT

## 2024-10-25 PROCEDURE — 80047 BASIC METABLC PNL IONIZED CA: CPT

## 2024-10-25 PROCEDURE — 80053 COMPREHEN METABOLIC PANEL: CPT | Performed by: EMERGENCY MEDICINE

## 2024-10-25 RX ORDER — IBUPROFEN 200 MG
16 TABLET ORAL
Status: DISCONTINUED | OUTPATIENT
Start: 2024-10-25 | End: 2024-10-26

## 2024-10-25 RX ORDER — TRAZODONE HYDROCHLORIDE 50 MG/1
50 TABLET ORAL NIGHTLY PRN
Status: DISCONTINUED | OUTPATIENT
Start: 2024-10-25 | End: 2024-10-29 | Stop reason: HOSPADM

## 2024-10-25 RX ORDER — SUCRALFATE 1 G/10ML
1 SUSPENSION ORAL EVERY 6 HOURS
Status: DISCONTINUED | OUTPATIENT
Start: 2024-10-25 | End: 2024-10-25

## 2024-10-25 RX ORDER — GLUCAGON 1 MG
1 KIT INJECTION
Status: DISCONTINUED | OUTPATIENT
Start: 2024-10-25 | End: 2024-10-26

## 2024-10-25 RX ORDER — IBUPROFEN 200 MG
24 TABLET ORAL
Status: DISCONTINUED | OUTPATIENT
Start: 2024-10-25 | End: 2024-10-26

## 2024-10-25 RX ORDER — AMLODIPINE BESYLATE 10 MG/1
10 TABLET ORAL DAILY
Status: DISCONTINUED | OUTPATIENT
Start: 2024-10-26 | End: 2024-10-29 | Stop reason: HOSPADM

## 2024-10-25 RX ORDER — HYDRALAZINE HYDROCHLORIDE 50 MG/1
50 TABLET, FILM COATED ORAL EVERY 8 HOURS
Status: DISCONTINUED | OUTPATIENT
Start: 2024-10-25 | End: 2024-10-29 | Stop reason: HOSPADM

## 2024-10-25 RX ORDER — HEPARIN SODIUM 5000 [USP'U]/ML
5000 INJECTION, SOLUTION INTRAVENOUS; SUBCUTANEOUS EVERY 8 HOURS
Status: DISCONTINUED | OUTPATIENT
Start: 2024-10-25 | End: 2024-10-28

## 2024-10-25 RX ORDER — FUROSEMIDE 40 MG/1
40 TABLET ORAL DAILY
Status: DISCONTINUED | OUTPATIENT
Start: 2024-10-26 | End: 2024-10-26

## 2024-10-25 RX ORDER — SODIUM CHLORIDE 0.9 % (FLUSH) 0.9 %
10 SYRINGE (ML) INJECTION EVERY 12 HOURS PRN
Status: DISCONTINUED | OUTPATIENT
Start: 2024-10-25 | End: 2024-10-29 | Stop reason: HOSPADM

## 2024-10-25 RX ORDER — GABAPENTIN 300 MG/1
300 CAPSULE ORAL 2 TIMES DAILY
Status: DISCONTINUED | OUTPATIENT
Start: 2024-10-25 | End: 2024-10-29 | Stop reason: HOSPADM

## 2024-10-25 RX ORDER — PANTOPRAZOLE SODIUM 40 MG/1
40 TABLET, DELAYED RELEASE ORAL DAILY
Status: DISCONTINUED | OUTPATIENT
Start: 2024-10-26 | End: 2024-10-29 | Stop reason: HOSPADM

## 2024-10-25 RX ORDER — ALUMINUM HYDROXIDE, MAGNESIUM HYDROXIDE, AND SIMETHICONE 1200; 120; 1200 MG/30ML; MG/30ML; MG/30ML
30 SUSPENSION ORAL
Status: DISCONTINUED | OUTPATIENT
Start: 2024-10-25 | End: 2024-10-29 | Stop reason: HOSPADM

## 2024-10-25 RX ORDER — DULOXETIN HYDROCHLORIDE 60 MG/1
60 CAPSULE, DELAYED RELEASE ORAL DAILY
Status: DISCONTINUED | OUTPATIENT
Start: 2024-10-26 | End: 2024-10-29 | Stop reason: HOSPADM

## 2024-10-25 RX ORDER — LOSARTAN POTASSIUM 50 MG/1
50 TABLET ORAL DAILY
Status: DISCONTINUED | OUTPATIENT
Start: 2024-10-26 | End: 2024-10-25

## 2024-10-25 RX ORDER — INSULIN ASPART 100 [IU]/ML
0-10 INJECTION, SOLUTION INTRAVENOUS; SUBCUTANEOUS
Status: DISCONTINUED | OUTPATIENT
Start: 2024-10-25 | End: 2024-10-26

## 2024-10-25 RX ORDER — NALOXONE HCL 0.4 MG/ML
0.02 VIAL (ML) INJECTION
Status: DISCONTINUED | OUTPATIENT
Start: 2024-10-25 | End: 2024-10-29 | Stop reason: HOSPADM

## 2024-10-25 RX ADMIN — IOHEXOL 100 ML: 350 INJECTION, SOLUTION INTRAVENOUS at 04:10

## 2024-10-25 RX ADMIN — GABAPENTIN 300 MG: 300 CAPSULE ORAL at 09:10

## 2024-10-25 RX ADMIN — HEPARIN SODIUM 5000 UNITS: 5000 INJECTION INTRAVENOUS; SUBCUTANEOUS at 09:10

## 2024-10-25 RX ADMIN — INSULIN ASPART 2 UNITS: 100 INJECTION, SOLUTION INTRAVENOUS; SUBCUTANEOUS at 07:10

## 2024-10-25 RX ADMIN — TRAZODONE HYDROCHLORIDE 50 MG: 50 TABLET ORAL at 09:10

## 2024-10-25 RX ADMIN — ALUMINUM HYDROXIDE, MAGNESIUM HYDROXIDE, AND SIMETHICONE 30 ML: 200; 200; 20 SUSPENSION ORAL at 09:10

## 2024-10-25 RX ADMIN — HYDRALAZINE HYDROCHLORIDE 50 MG: 25 TABLET ORAL at 09:10

## 2024-10-25 NOTE — HPI
Mr. Cortes Schroeder is a 64 y.o. male with previous medical history of insulin dependent type 2 diabetes mellitus with insulin pump, polyneuropathy due to type 2 diabetes mellitus, peripheral vascular disease, hyperlipidemia, And fall resulting in ORIF of right ankle in July of this year. Course parascapular free flap that did not take that ultimately resulted in getting BKA done 10/1. He was also found to have MSSA at that time. He was sent to Rehab after BKA procedure was done, with 2 week course IV oxacillin. Pt d/c from rehab on 10/20, and went to f/u w/ Plastic surgery today who noticed that he now has a non-healing wound to his back where his parascapular flap was extracted. He had also been complaining of some N/V and constipation. He was sent to the ED for admission, so he could undergo workup to r/o SBO as well as undergo debridement of his BKA and back wound with wound vac placement. At bedside, pt states that he has had 3-4 days of N/V and constipation without a BM, which resolved earlier today and he started having regular BM again. He states that he did notice some drainage from his back wound 3 days ago that was mostly clear, with some blood. However, he denied any other sx of fever, chills, chest pain, SOB, or dysuria.      In the ED, pt was afebrile HDS. CBC was done that showed WBC of 11, and CMP showed elevated glucose at 269. CT abd/pelvis was obtained to r/o SBO which was negative. Plastic Surgery team planning to see pt and do inpatient debridement procedure.

## 2024-10-25 NOTE — ED TRIAGE NOTES
Cortes Schroeder, a 64 y.o. male presents to the ED from ortho plastics referral for possibly wound vac placement on left posterior back, right BKA 10/1. AAOx4.     Triage note:  Chief Complaint   Patient presents with    Wound Check     States was sent from ortho plastics to check on skin graft from back, states needs wound vac placed. Also c/o constipation      Review of patient's allergies indicates:   Allergen Reactions    Invokana [canagliflozin] Anaphylaxis    Percocet [oxycodone-acetaminophen] Nausea Only and Hallucinations    Biaxin [clarithromycin]     Hydrocodone Other (See Comments)     Dizzy/nausea/hallucinations    Sulfa (sulfonamide antibiotics) Nausea Only and Rash     Past Medical History:   Diagnosis Date    Anxiety 12/18/2012    Back pain 12/18/2012    Cataract     Chronic pain syndrome 04/24/2016    Diabetes type 2, controlled 02/20/2016    Diabetic retinopathy     DM (diabetes mellitus) 12/18/2012    DM (diabetes mellitus), type 2, uncontrolled 11/16/2013    Gastroesophageal reflux disease without esophagitis 02/20/2016    Hyperlipidemia 12/18/2012    Insomnia 08/07/2014    Neuropathy 11/16/2013

## 2024-10-25 NOTE — ED NOTES
I-STAT Chem-8+ Results:   Value Reference Range   Sodium 131 136-145 mmol/L   Potassium  5.1 3.5-5.1 mmol/L   Chloride 94  mmol/L   Ionized Calcium 1.16 1.06-1.42 mmol/L   CO2 (measured) 30 23-29 mmol/L   Glucose 270  mg/dL   BUN 26 6-30 mg/dL   Creatinine 0.9 0.5-1.4 mg/dL   Hematocrit 37 36-54%

## 2024-10-25 NOTE — SUBJECTIVE & OBJECTIVE
Past Medical History:   Diagnosis Date    Anxiety 12/18/2012    Back pain 12/18/2012    Cataract     Chronic pain syndrome 04/24/2016    Diabetes type 2, controlled 02/20/2016    Diabetic retinopathy     DM (diabetes mellitus) 12/18/2012    DM (diabetes mellitus), type 2, uncontrolled 11/16/2013    Gastroesophageal reflux disease without esophagitis 02/20/2016    Hyperlipidemia 12/18/2012    Insomnia 08/07/2014    Neuropathy 11/16/2013       Past Surgical History:   Procedure Laterality Date    ANGIOGRAPHY OF LOWER EXTREMITY Right 9/17/2024    Procedure: Angiogram Extremity Unilateral;  Surgeon: GINA Morton II, MD;  Location: [facility] OR [address] [place] [address];  Service: Vascular;  Laterality: Right;  Fluro time 19.5 min  mGy 260.49    ANKLE HARDWARE REMOVAL Right 9/23/2024    Procedure: REMOVAL, HARDWARE, ANKLE;  Surgeon: Moe Liz MD;  Location: [facility] OR [address] [place] [address];  Service: Orthopedics;  Laterality: Right;    APPLICATION OF WOUND VACUUM-ASSISTED CLOSURE DEVICE Right 9/6/2024    Procedure: APPLICATION, WOUND VAC;  Surgeon: Moe Liz MD;  Location: [facility] OR [address] [place] [address];  Service: Orthopedics;  Laterality: Right;    APPLICATION, EXTERNAL FIXATION DEVICE, FOR ANKLE FRACTURE Right 7/18/2024    Procedure: APPLICATION, EXTERNAL FIXATION DEVICE, FOR ANKLE FRACTURE;  Surgeon: Moe Liz MD;  Location: [facility] OR [address] [place] [address];  Service: Orthopedics;  Laterality: Right;    ARTHROTOMY OF ANKLE Right 9/9/2024    Procedure: ARTHROTOMY, ANKLE;  Surgeon: Moe Liz MD;  Location: [facility] OR [address] [place] [address];  Service: Orthopedics;  Laterality: Right;    BACK SURGERY  2003    Lumbar Spine    BELOW KNEE AMPUTATION OF LOWER EXTREMITY Right 10/1/2024    Procedure: AMPUTATION, BELOW KNEE - RIGHT;  Surgeon: Moe Liz MD;  Location: [facility] OR [address] [place] [address];  Service: Orthopedics;  Laterality: Right;  with wound vac placement    CATARACT EXTRACTION      CLOSED REDUCTION, FRACTURE, ANKLE, TRIMALLEOLAR Right 7/18/2024    Procedure: CLOSED  REDUCTION, FRACTURE, ANKLE, TRIMALLEOLAR;  Surgeon: Moe Liz MD;  Location: [facility] OR 2ND FLR;  Service: Orthopedics;  Laterality: Right;    EPIDURAL STEROID INJECTION N/A 1/16/2019    Procedure: Injection, Steroid, Epidural Cervical;  Surgeon: Andrew Sinclair Jr., MD;  Location: [facility] ENDO;  Service: Pain Management;  Laterality: N/A;  Cervical Epidural Steroid Injection C7-T1    47897    Arrive @ 1240    EPIDURAL STEROID INJECTION N/A 2/20/2019    Procedure: Injection, Steroid, Epidural;  Surgeon: Andrew Sinclair Jr., MD;  Location: [facility] ENDO;  Service: Pain Management;  Laterality: N/A;  Lumbar Epidural Steroid Injection L4-5    06518    Arrive @ 1215 (requests latest time)    FIXATION OF SYNDESMOSIS OF ANKLE Right 7/26/2024    Procedure: FIXATION, SYNDESMOSIS, ANKLE;  Surgeon: Moe Liz MD;  [facility];  Service: Orthopedics;  Laterality: Right;    FLAP PROCEDURE Right 9/26/2024    Procedure: CREATION, FREE FLAP;  Surgeon: Juanito Cueto DO;  [facility];  Service: Plastics;  Laterality: Right;  Spy; Def Free Flap; Micro instr., Microscope; 2 bovies, 2 teams    INJECTION, SPINE, LUMBOSACRAL, TRANSFORAMINAL APPROACH Bilateral 6/14/2024    Procedure: Bilateral L5 Transforaminal Epidural Steroid Injections;  Surgeon: Andrew Sinclair Jr., MD;  Location: [facility] PAIN MANAGEMENT;  Service: Pain Management;  Laterality: Bilateral;  @1300(given)  ASA last 6/8  Check BG  MD Sign.    IRRIGATION AND DEBRIDEMENT Right 9/6/2024    Procedure: IRRIGATION AND DEBRIDEMENT;  Surgeon: Moe Liz MD;  Location: [facility] OR [facility] FLR;  Service: Orthopedics;  Laterality: Right;    IRRIGATION AND DEBRIDEMENT Right 9/20/2024    Procedure: IRRIGATION AND DEBRIDEMENT;  Surgeon: Moe Liz MD;  Location: [facility] OR 2ND FLR;  Service: Orthopedics;  Laterality: Right;    IRRIGATION AND DEBRIDEMENT OF LOWER EXTREMITY Right 9/9/2024    Procedure: IRRIGATION AND DEBRIDEMENT,  LOWER EXTREMITY - WITH WOUND VAC CHANGE, RIGHT ANKLE;  Surgeon: Moe Liz MD;  Location: [facility] OR 2ND FLR;  Service: Orthopedics;  Laterality: Right;  WITH WOUND VAC CHANGE, RIGHT ANKLE    OPEN REDUCTION AND INTERNAL FIXATION (ORIF) OF FRACTURE OF DISTAL RADIUS Left 7/19/2024    Procedure: ORIF, FRACTURE, RADIUS, DISTAL - LEFT;  Surgeon: Moe Liz MD;  Location: [facility] OR [facility] FLR;  Service: Orthopedics;  Laterality: Left;  LEFT, SYNTHES, C ARM    OPEN REDUCTION AND INTERNAL FIXATION (ORIF) OF INJURY OF ANKLE Right 7/26/2024    Procedure: ORIF, ANKLE;  Surgeon: Moe Liz MD;  [facility];  Service: Orthopedics;  Laterality: Right;    REMOVAL OF EXTERNAL FIXATION DEVICE Right 7/26/2024    Procedure: REMOVAL, EXTERNAL FIXATION DEVICE;  Surgeon: Moe Liz MD;  [facility];  Service: Orthopedics;  Laterality: Right;    REPLACEMENT OF WOUND VACUUM-ASSISTED CLOSURE DEVICE Right 9/17/2024    Procedure: REPLACEMENT, WOUND VAC;  Surgeon: Moe Liz MD;  [facility];  Service: Orthopedics;  Laterality: Right;  wound vac dressing exchange    REPLACEMENT OF WOUND VACUUM-ASSISTED CLOSURE DEVICE Right 9/20/2024    Procedure: REPLACEMENT, WOUND VAC;  Surgeon: Moe Liz MD;  [facility];  Service: Orthopedics;  Laterality: Right;    REPLACEMENT OF WOUND VACUUM-ASSISTED CLOSURE DEVICE Right 9/23/2024    Procedure: REPLACEMENT, WOUND VAC; white & black sponge;  Surgeon: Moe Liz MD;  [facility];  Service: Orthopedics;  Laterality: Right;       Review of patient's allergies indicates:   Allergen Reactions    Invokana [canagliflozin] Anaphylaxis    Percocet [oxycodone-acetaminophen] Nausea Only and Hallucinations    Biaxin [clarithromycin]     Hydrocodone Other (See Comments)     Dizzy/nausea/hallucinations    Sulfa (sulfonamide antibiotics) Nausea Only and Rash       Current Facility-Administered  Medications on File Prior to Encounter   Medication    [DISCONTINUED] GENERIC EXTERNAL MEDICATION    [DISCONTINUED] GENERIC EXTERNAL MEDICATION    [DISCONTINUED] GENERIC EXTERNAL MEDICATION     Current Outpatient Medications on File Prior to Encounter   Medication Sig    amLODIPine (NORVASC) 10 MG tablet Take 1 tablet (10 mg total) by mouth once daily.    apixaban (ELIQUIS) 2.5 mg Tab Take 1 tablet (2.5 mg total) by mouth 2 (two) times daily.    0.9% NaCl SolP 500 mL with oxacillin 1 gram SolR 12 g Inject 12 g into the vein once daily. End date 10/20/2024.    acetaminophen (TYLENOL) 500 MG tablet Take 2 tablets (1,000 mg total) by mouth every 8 (eight) hours.    aspirin (ECOTRIN) 81 MG EC tablet Take 1 tablet (81 mg total) by mouth once daily.    atorvastatin (LIPITOR) 40 MG tablet Take 40 mg by mouth once daily.    blood-glucose sensor (DEXCOM G6 SENSOR) [place] Change sensor every 10 days    blood-glucose transmitter (DEXCOM G6 TRANSMITTER) [place] Change transmitter every 3 months    calcium carbonate (TUMS) 200 mg calcium (500 mg) chewable tablet Take 2 tablets (1,000 mg total) by mouth 3 (three) times daily as needed for Heartburn.    celecoxib (CELEBREX) 200 MG capsule Take 1 capsule (200 mg total) by mouth once daily.    cyanocobalamin (VITAMIN B-12) 1000 MCG tablet Take 1,000 mcg by mouth once daily.    DULoxetine (CYMBALTA) 60 MG capsule Take 1 capsule (60 mg total) by mouth once daily.    emtricitabine-tenofovir 200-300 mg (TRUVADA) 200-300 mg Tab Take 1 tablet by mouth once daily.    fish oil-omega-3 fatty acids 300-1,000 mg capsule Take 1 capsule by mouth 2 (two) times daily.    furosemide (LASIX) 40 MG tablet Take 1 tablet (40 mg total) by mouth once daily.    gabapentin (NEURONTIN) 300 MG capsule Take 1 capsule (300 mg total) by mouth 2 (two) times daily.    HUMALOG U-100 INSULIN 100 unit/mL injection 1:20 U PRN high blood sugar and snacks. Correction dose- Enter carb coverage intake upon  administration  Target number 120 Carbohydrate coverage #1 1:2 Carbohydrate coverage #1 time [id number] Carbohydrate coverage #2 1:3 Carbohydrate coverage #2 time [id number] Carbohydrate coverage #3 Carbohydrate coverage #3 time Carbohydrate coverage #4 Carbohydrate coverage #4 time Sensitivity #1 1:20 Sensitivity #1 time 0575-9101 Sensitivity #2 Sensitivity #2 time Sensitivity #3 Sensitivity #3 time Sensitivity #4 Sensitivity #4 time Sensitivity #5 Sensitivity #5 time Order Questions Question Answer Comment       50 U SQ continuous  Target number 120 Basal Rate #1 2.3 Basal rate #1 time 5671-0655 Basal Rate #2 1.55 Basal rate #2 time 9510-3918 Basal rate #3 Basal rate #3 time Basal rate #4 Basal rate #4 time Basal rate #5 Basal rate #5 time    hydrALAZINE (APRESOLINE) 50 MG tablet Take 1 tablet (50 mg total) by mouth every 8 (eight) hours.    losartan (COZAAR) 25 MG tablet Take 2 tablets (50 mg total) by mouth once daily.    magnesium oxide (MAG-OX) 400 mg (241.3 mg magnesium) tablet Take 0.5 tablets (200 mg total) by mouth once daily.    melatonin (MELATIN) 3 mg tablet Take 2 tablets (6 mg total) by mouth nightly as needed for Insomnia.    methocarbamoL (ROBAXIN) 500 MG Tab Take 1 tablet (500 mg total) by mouth every 8 (eight) hours.    morphine (MSIR) 15 MG tablet Take 1 tablet (15 mg total) by mouth every 4 (four) hours as needed (Severe pain 7-10/10).    MOUNJARO 10 mg/0.5 mL PnIj Inject 10 mg into the skin once a week. HOLD until all surgeries completed and out of rehab    nortriptyline (PAMELOR) 50 MG capsule Take 1 capsule (50 mg total) by mouth nightly.    ondansetron (ZOFRAN-ODT) 4 MG TbDL Take 2 tablets (8 mg total) by mouth every 6 (six) hours as needed (nausea).    pantoprazole (PROTONIX) 40 MG tablet Take 1 tablet (40 mg total) by mouth once daily.    polyethylene glycol (GLYCOLAX) 17 gram PwPk Take 17 g by mouth once daily.    senna (SENOKOT) 8.6 mg tablet Take 1 tablet by mouth 2 (two) times a day.     senna-docusate 8.6-50 mg (PERICOLACE) 8.6-50 mg per tablet Take 1 tablet by mouth 2 (two) times daily.    sucralfate (CARAFATE) 100 mg/mL suspension Take 10 mLs (1 g total) by mouth every 6 (six) hours.    traMADoL (ULTRAM) 50 mg tablet Take 1 tablet (50 mg total) by mouth every 6 (six) hours as needed (Moderate pain 4-6/10).    traZODone (DESYREL) 100 MG tablet Take 1 tablet (100 mg total) by mouth every evening.    vitamin D (VITAMIN D3) 1000 units Tab Take 1 tablet (1,000 Units total) by mouth once daily.    [DISCONTINUED] insulin aspart U-100 (NOVOLOG U-100 INSULIN ASPART) 100 unit/mL injection Use in insulin pump. Max daily dose 150 units.     Family History       Problem Relation (Age of Onset)    Alzheimer's disease Father    Cataracts Father    Heart attack Mother    Hypertension Father, Mother    Migraines Mother    Parkinsonism Father          Tobacco Use    Smoking status: Never    Smokeless tobacco: Never   Substance and Sexual Activity    Alcohol use: Yes     Alcohol/week: 1.0 standard drink of alcohol     Types: 1 Glasses of wine per week     Comment: occasionally    Drug use: No    Sexual activity: Not Currently     Partners: Male     Birth control/protection: Condom     Comment: 10/2/17      Review of Systems   Constitutional:  Negative for chills and fever.   Respiratory:  Negative for cough, shortness of breath and wheezing.    Cardiovascular:  Negative for chest pain and palpitations.   Gastrointestinal:  Positive for constipation, nausea and vomiting. Negative for abdominal distention and abdominal pain.   Genitourinary:  Negative for difficulty urinating and dysuria.   Neurological:  Negative for dizziness and light-headedness.     Objective:     Vital Signs (Most Recent):  Temp: 98.3 °F (36.8 °C) (10/25/24 1505)  Pulse: 101 (10/25/24 1815)  Resp: 18 (10/25/24 1815)  BP: (!) 154/74 (10/25/24 1815)  SpO2: 100 % (10/25/24 1815) Vital Signs (24h Range):  Temp:  [98.2 °F (36.8 °C)-98.3 °F (36.8  °C)] 98.3 °F (36.8 °C)  Pulse:  [] 101  Resp:  [13-18] 18  SpO2:  [99 %-100 %] 100 %  BP: (141-174)/(67-82) 154/74     Weight: 92.5 kg (204 lb)  Body mass index is 33.95 kg/m².     Physical Exam  HENT:      Head: Atraumatic.   Eyes:      Extraocular Movements: Extraocular movements intact.      Pupils: Pupils are equal, round, and reactive to light.   Cardiovascular:      Rate and Rhythm: Normal rate and regular rhythm.      Pulses: Normal pulses.      Heart sounds: Normal heart sounds.   Pulmonary:      Effort: Pulmonary effort is normal.      Breath sounds: Normal breath sounds.   Abdominal:      General: Bowel sounds are normal.      Palpations: Abdomen is soft.   Musculoskeletal:      Comments: -poorly healed wound in place to left scapular area at previous flap site. Staples still in place, and minimal clear yellow drainage present  -R BKA site w/ poor healing wound   Neurological:      Mental Status: He is alert and oriented to person, place, and time.              CRANIAL NERVES     CN III, IV, VI   Pupils are equal, round, and reactive to light.       Significant Labs: All pertinent labs within the past 24 hours have been reviewed.  CBC:   Recent Labs   Lab 10/25/24  1524 10/25/24  1531   WBC 12.40  --    HGB 11.5*  --    HCT 35.7* 37     --      CMP:   Recent Labs   Lab 10/25/24  1524   *   K 4.7   CL 96   CO2 26   *   BUN 20   CREATININE 1.0   CALCIUM 9.6   PROT 7.6   ALBUMIN 2.7*   BILITOT 0.2   ALKPHOS 135   AST 16   ALT 10   ANIONGAP 11       Significant Imaging:  CT Abd/Pelvis negative for signs of acute bowel obstruction

## 2024-10-25 NOTE — ED PROVIDER NOTES
Encounter Date: 10/25/2024       History     Chief Complaint   Patient presents with    Wound Check     States was sent from ortho plastics to check on skin graft from back, states needs wound vac placed. 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  Patient was discharged to rehab on IV oxacillin for 6 weeks and discharged on 10/20 from rehab.  Patient was seen at plastic surgery center today who symptoms to the ER for nausea vomiting constipation as well as debridement of wounds and likely wound VAC. Discussed case with Plastics who recommend admission to medicine and they will debride the back wound in the hospital and place a wound VAC. they report he was actively vomiting in his postop visit and was having constipation.  They also said he will ultimately need home health, PT and wound care so they recommend admission to Hospital Medicine and they will consult in the morning.    The history is provided by the patient. No  was used.     Review of patient's allergies indicates:   Allergen Reactions    Invokana [canagliflozin] Anaphylaxis    Percocet [oxycodone-acetaminophen] Nausea Only and Hallucinations    Biaxin [clarithromycin]     Hydrocodone Other (See Comments)     Dizzy/nausea/hallucinations    Sulfa (sulfonamide antibiotics) Nausea Only and Rash     Past Medical History:   Diagnosis Date    Anxiety 12/18/2012    Back pain 12/18/2012    Cataract     Chronic pain syndrome 04/24/2016    Diabetes type 2, controlled 02/20/2016    Diabetic retinopathy     DM (diabetes mellitus)  12/18/2012    DM (diabetes mellitus), type 2, uncontrolled 11/16/2013    Gastroesophageal reflux disease without esophagitis 02/20/2016    Hyperlipidemia 12/18/2012    Insomnia 08/07/2014    Neuropathy 11/16/2013     Past Surgical History:   Procedure Laterality Date    ANGIOGRAPHY OF LOWER EXTREMITY Right 9/17/2024    Procedure: Angiogram Extremity Unilateral;  Surgeon: GINA Morton II, MD;  [facility];  Service: Vascular;  Laterality: Right;  Fluro time 19.5 min  mGy 260.49    ANKLE HARDWARE REMOVAL Right 9/23/2024    Procedure: REMOVAL, HARDWARE, ANKLE;  Surgeon: Moe Liz MD;  Location[facility];  Service: Orthopedics;  Laterality: Right;    APPLICATION OF WOUND VACUUM-ASSISTED CLOSURE DEVICE Right 9/6/2024    Procedure: APPLICATION, WOUND VAC;  Surgeon: Moe Liz MD;  Location: [facility];  Service: Orthopedics;  Laterality: Right;    APPLICATION, EXTERNAL FIXATION DEVICE, FOR ANKLE FRACTURE Right 7/18/2024    Procedure: APPLICATION, EXTERNAL FIXATION DEVICE, FOR ANKLE FRACTURE;  Surgeon: Moe Liz MD;  Location: [facility];  Service: Orthopedics;  Laterality: Right;    ARTHROTOMY OF ANKLE Right 9/9/2024    Procedure: ARTHROTOMY, ANKLE;  Surgeon: Moe Liz MD;  Location: [facility];  Service: Orthopedics;  Laterality: Right;    BACK SURGERY  2003    Lumbar Spine    BELOW KNEE AMPUTATION OF LOWER EXTREMITY Right 10/1/2024    Procedure: AMPUTATION, BELOW KNEE - RIGHT;  Surgeon: Moe Liz MD;  Location: [facility];  Service: Orthopedics;  Laterality: Right;  with wound vac placement    CATARACT EXTRACTION      CLOSED REDUCTION, FRACTURE, ANKLE, TRIMALLEOLAR Right 7/18/2024    Procedure: CLOSED REDUCTION, FRACTURE, ANKLE, TRIMALLEOLAR;  Surgeon: Moe Liz MD;  Location: [facility];  Service: Orthopedics;  Laterality: Right;    EPIDURAL STEROID INJECTION N/A 1/16/2019    Procedure: Injection, Steroid, Epidural  Cervical;  Surgeon: Andrew Sinclair Jr., MD;  Location: [facility] ENDO;  Service: Pain Management;  Laterality: N/A;  Cervical Epidural Steroid Injection C7-T1    [id number]    Arrive @ 1240    EPIDURAL STEROID INJECTION N/A 2/20/2019    Procedure: Injection, Steroid, Epidural;  Surgeon: Andrew Sinclair Jr., MD;  Location: [facility] ENDO;  Service: Pain Management;  Laterality: N/A;  Lumbar Epidural Steroid Injection L4-5    [id number]    Arrive @ 1215 (requests latest time)    FIXATION OF SYNDESMOSIS OF ANKLE Right 7/26/2024    Procedure: FIXATION, SYNDESMOSIS, ANKLE;  Surgeon: Moe Liz MD;  [facility];  Service: Orthopedics;  Laterality: Right;    FLAP PROCEDURE Right 9/26/2024    Procedure: CREATION, FREE FLAP;  Surgeon: Juanito Cueto DO;  [facility];  Service: Plastics;  Laterality: Right;  Spy; Def Free Flap; Micro instr., Microscope; 2 bovies, 2 teams    INJECTION, SPINE, LUMBOSACRAL, TRANSFORAMINAL APPROACH Bilateral 6/14/2024    Procedure: Bilateral L5 Transforaminal Epidural Steroid Injections;  Surgeon: Andrew Sinclair Jr., MD;  Location: [facility] PAIN MANAGEMENT;  Service: Pain Management;  Laterality: Bilateral;  @1300(given)  ASA last 6/8  Check BG  MD Sign.    IRRIGATION AND DEBRIDEMENT Right 9/6/2024    Procedure: IRRIGATION AND DEBRIDEMENT;  Surgeon: Moe Liz MD;  [facility];  Service: Orthopedics;  Laterality: Right;    IRRIGATION AND DEBRIDEMENT Right 9/20/2024    Procedure: IRRIGATION AND DEBRIDEMENT;  Surgeon: Moe Liz MD;  [facility];  Service: Orthopedics;  Laterality: Right;    IRRIGATION AND DEBRIDEMENT OF LOWER EXTREMITY Right 9/9/2024    Procedure: IRRIGATION AND DEBRIDEMENT, LOWER EXTREMITY - WITH WOUND VAC CHANGE, RIGHT ANKLE;  Surgeon: Moe Liz MD;  [facility];  Service: Orthopedics;  Laterality: Right;  WITH WOUND VAC CHANGE, RIGHT ANKLE    OPEN REDUCTION AND INTERNAL  FIXATION (ORIF) OF FRACTURE OF DISTAL RADIUS Left 7/19/2024    Procedure: ORIF, FRACTURE, RADIUS, DISTAL - LEFT;  Surgeon: Moe Liz MD;  [facility];  Service: Orthopedics;  Laterality: Left;  LEFT, SYNTHES, C ARM    OPEN REDUCTION AND INTERNAL FIXATION (ORIF) OF INJURY OF ANKLE Right 7/26/2024    Procedure: ORIF, ANKLE;  Surgeon: Moe Liz MD;  [facility];  Service: Orthopedics;  Laterality: Right;    REMOVAL OF EXTERNAL FIXATION DEVICE Right 7/26/2024    Procedure: REMOVAL, EXTERNAL FIXATION DEVICE;  Surgeon: Moe Liz MD;  [facility];  Service: Orthopedics;  Laterality: Right;    REPLACEMENT OF WOUND VACUUM-ASSISTED CLOSURE DEVICE Right 9/17/2024    Procedure: REPLACEMENT, WOUND VAC;  Surgeon: Moe Liz MD;  [facility];  Service: Orthopedics;  Laterality: Right;  wound vac dressing exchange    REPLACEMENT OF WOUND VACUUM-ASSISTED CLOSURE DEVICE Right 9/20/2024    Procedure: REPLACEMENT, WOUND VAC;  Surgeon: Moe Liz MD;  [facility];  Service: Orthopedics;  Laterality: Right;    REPLACEMENT OF WOUND VACUUM-ASSISTED CLOSURE DEVICE Right 9/23/2024    Procedure: REPLACEMENT, WOUND VAC; white & black sponge;  Surgeon: Moe Liz MD;  Location: [facility] OR [address] [place] [address];  Service: Orthopedics;  Laterality: Right;     Family History   Problem Relation Name Age of Onset    Cataracts Father      Hypertension Father      Parkinsonism Father      Alzheimer's disease Father      Migraines Mother      Hypertension Mother      Heart attack Mother      Amblyopia Neg Hx      Blindness Neg Hx      Glaucoma Neg Hx      Macular degeneration Neg Hx      Retinal detachment Neg Hx      Strabismus Neg Hx       Social History     Tobacco Use    Smoking status: Never    Smokeless tobacco: Never   Substance Use Topics    Alcohol use: Yes     Alcohol/week: 1.0 standard drink of alcohol     Types: 1 Glasses of wine per week      Comment: occasionally    Drug use: No     Review of Systems    Physical Exam     Initial Vitals [10/25/24 1426]   BP Pulse Resp Temp SpO2   (!) 141/67 86 15 98.2 °F (36.8 °C) 99 %      MAP       --         Physical Exam    Nursing note and vitals reviewed.  Constitutional: He appears well-developed. No distress.   HENT:   Head: Normocephalic and atraumatic.   Nose: Nose normal.   Eyes: EOM are normal.   Neck: Neck supple. No tracheal deviation present. No JVD present.   Cardiovascular:  Normal rate, regular rhythm, normal heart sounds and intact distal pulses.     Exam reveals no gallop and no friction rub.       No murmur heard.  Pulmonary/Chest: Breath sounds normal. No respiratory distress. He has no wheezes. He has no rhonchi. He has no rales.   Abdominal: Abdomen is soft. Bowel sounds are normal. He exhibits no distension. There is no abdominal tenderness. There is no rebound and no guarding.   Musculoskeletal:      Cervical back: Neck supple.      Comments: R BKA     Neurological: He is alert and oriented to person, place, and time. He has normal strength. No cranial nerve deficit or sensory deficit.   Skin: Skin is warm and dry. Capillary refill takes less than 2 seconds. No rash noted.   See images for details    Psychiatric: He has a normal mood and affect.                   ED Course   Procedures  Labs Reviewed   CBC W/ AUTO DIFFERENTIAL - Abnormal       Result Value    WBC 12.40      RBC 4.00 (*)     Hemoglobin 11.5 (*)     Hematocrit 35.7 (*)     MCV 89      MCH 28.8      MCHC 32.2      RDW 15.1 (*)     Platelets 408      MPV 9.7      Immature Granulocytes 0.6 (*)     Gran # (ANC) 10.0 (*)     Immature Grans (Abs) 0.07 (*)     Lymph # 1.3      Mono # 0.9      Eos # 0.0      Baso # 0.11      nRBC 0      Gran % 80.5 (*)     Lymph % 10.3 (*)     Mono % 7.4      Eosinophil % 0.3      Basophil % 0.9      Differential Method Automated     COMPREHENSIVE METABOLIC PANEL - Abnormal    Sodium 133 (*)      Potassium 4.7      Chloride 96      CO2 26      Glucose 267 (*)     BUN 20      Creatinine 1.0      Calcium 9.6      Total Protein 7.6      Albumin 2.7 (*)     Total Bilirubin 0.2      Alkaline Phosphatase 135      AST 16      ALT 10      eGFR >60.0      Anion Gap 11     ISTAT PROCEDURE - Abnormal    POC Glucose 270 (*)     POC BUN 26      POC Creatinine 0.9      POC Sodium 131 (*)     POC Potassium 5.1      POC Chloride 94 (*)     POC TCO2 (MEASURED) 30 (*)     POC Ionized Calcium 1.16      POC Hematocrit 37      Sample KAREN     LIPASE    Lipase <4     LACTIC ACID, PLASMA    Lactate (Lactic Acid) 2.0     ISTAT CHEM8          Imaging Results              CT Abdomen Pelvis With IV Contrast NO Oral Contrast (Final result)  Result time 10/25/24 16:58:52      Final result by Nas Grossman MD (10/25/24 16:58:52)                   Impression:      No evidence of bowel obstruction.    New nodular focus in the left posterior costophrenic angle is probably inflammatory/infectious in nature.    New air within the left lateral lower chest wall and upper lateral abdominal wall probably related to recent surgery but should be correlated with relevant surgical history.  No drainable fluid collection.    Multiple calcifications along the course of a dilated pancreatic duct with a large calcification likely within the pancreatic duct in the pancreatic head with diffuse pancreatic parenchymal atrophy.  No peripancreatic fat stranding.      Electronically signed by: Nas Grossman  Date:    10/25/2024  Time:    16:58               Narrative:    EXAMINATION:  CT ABDOMEN PELVIS WITH IV CONTRAST    CLINICAL HISTORY:  Abdominal pain, post-op;Bowel obstruction suspected;    TECHNIQUE:  Low dose axial images, sagittal and coronal reformations were obtained from the lung bases to the pubic symphysis following the IV administration of 100 mL of Omnipaque 350 .  Oral contrast was not  administered.    COMPARISON:  09/12/2024    FINDINGS:  Abdomen:    - Lower thorax:Stable appearance of the base of the heart.    - Lung bases: New 5 mm nodular focus within the left posterior costophrenic angle is probably inflammatory/infectious in nature given intervening development relative to a recent CT scan.    - Liver: No focal mass.    - Gallbladder: No calcified gallstones.    - Bile Ducts: No evidence of intra or extra hepatic biliary ductal dilation.    - Spleen: Negative.    - Kidneys: Similar renal cortical lobulation.  No hydroureteronephrosis.    - Adrenals: Redemonstration of a left adrenal nodule measuring 2.0 x 1.5 cm consistent with an adrenal adenoma.    - Pancreas: There is diffuse pancreatic atrophy with severe dilatation of the pancreatic duct.  Multiple calcifications are seen along the course of the pancreatic duct and there is a probable intraductal calculus in the region of the pancreatic head measuring 1.9 cm.  The appearance is similar as compared to the examination from 09/12/2024.    - Retroperitoneum:  No significant adenopathy.    - Vascular: Similar atherosclerotic calcification of the abdominal aorta and iliac arteries without aneurysm.  The mesenteric arteries are patent.  The renal arteries are patent.    - Abdominal wall:  There is soft tissue emphysema within the left lateral lower chest wall extending into the upper abdomen which may be postsurgical in nature.  Correlate with recent surgical history.  There is no drainable fluid collection.    Pelvis:    No pelvic mass, adenopathy, or free fluid.    Bowel/Mesentery:    No evidence of bowel obstruction or inflammation.    Bones:  No change.                                       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  Patient was discharged to rehab on IV oxacillin for 6 weeks and discharged on 10/20 from rehab.  Discussed case with Plastics who recommend admission to medicine and they will debride the back wound and place a wound VAC.  They also said he will ultimately need home health, PT and wound care so they recommend admission to Hospital Medicine and they will consult in the morning. They also wanted to r/o SBO. CT was negative. Labs show hyperglycemia. Patient accepted to medicine for further workup and management.     Amount and/or Complexity of Data Reviewed  Labs: ordered.  Radiology: ordered. Decision-making details documented in ED Course.    Risk  Prescription drug management.               ED Course as of 10/25/24 1728   Fri Oct 25, 2024   1701 CT Abdomen Pelvis With IV Contrast NO Oral Contrast [BD]   1702 Impression:     No evidence of bowel obstruction.     New nodular focus in the left posterior costophrenic angle is probably inflammatory/infectious in nature.     New air within the left lateral lower chest wall and upper lateral abdominal wall probably related to recent surgery but should be correlated with relevant surgical history.  No drainable fluid collection.     Multiple calcifications along the course of a dilated pancreatic duct with a large calcification likely within the pancreatic duct in the pancreatic head with diffuse pancreatic parenchymal atrophy.  No peripancreatic fat stranding.        Electronically signed by:Nas Grossman  Date:                                            10/25/2024  Time:                                           16:58   [BD]      ED Course User Index  [BD] Roxanne  MD Luis Enrique                           Clinical Impression:  Final diagnoses:  [Z51.89] Visit for wound check (Primary)  [R73.9] Hyperglycemia          ED Disposition Condition    Observation Stable                Luis Enrique Oliveira MD  10/25/24 7475

## 2024-10-26 ENCOUNTER — ANESTHESIA EVENT (OUTPATIENT)
Dept: SURGERY | Facility: HOSPITAL | Age: 64
DRG: 902 | End: 2024-10-26
Payer: COMMERCIAL

## 2024-10-26 LAB
ALBUMIN SERPL BCP-MCNC: 2.4 G/DL (ref 3.5–5.2)
ALP SERPL-CCNC: 116 U/L (ref 40–150)
ALT SERPL W/O P-5'-P-CCNC: 9 U/L (ref 10–44)
ANION GAP SERPL CALC-SCNC: 9 MMOL/L (ref 8–16)
AST SERPL-CCNC: 12 U/L (ref 10–40)
BASOPHILS # BLD AUTO: 0.1 K/UL (ref 0–0.2)
BASOPHILS NFR BLD: 0.9 % (ref 0–1.9)
BILIRUB SERPL-MCNC: 0.3 MG/DL (ref 0.1–1)
BUN SERPL-MCNC: 14 MG/DL (ref 8–23)
CALCIUM SERPL-MCNC: 9.2 MG/DL (ref 8.7–10.5)
CHLORIDE SERPL-SCNC: 101 MMOL/L (ref 95–110)
CO2 SERPL-SCNC: 28 MMOL/L (ref 23–29)
CREAT SERPL-MCNC: 0.8 MG/DL (ref 0.5–1.4)
DIFFERENTIAL METHOD BLD: ABNORMAL
EOSINOPHIL # BLD AUTO: 0.2 K/UL (ref 0–0.5)
EOSINOPHIL NFR BLD: 1.5 % (ref 0–8)
ERYTHROCYTE [DISTWIDTH] IN BLOOD BY AUTOMATED COUNT: 15.2 % (ref 11.5–14.5)
EST. GFR  (NO RACE VARIABLE): >60 ML/MIN/1.73 M^2
GLUCOSE SERPL-MCNC: 112 MG/DL (ref 70–110)
HCT VFR BLD AUTO: 33.3 % (ref 40–54)
HGB BLD-MCNC: 10.8 G/DL (ref 14–18)
IMM GRANULOCYTES # BLD AUTO: 0.03 K/UL (ref 0–0.04)
IMM GRANULOCYTES NFR BLD AUTO: 0.3 % (ref 0–0.5)
LYMPHOCYTES # BLD AUTO: 2.1 K/UL (ref 1–4.8)
LYMPHOCYTES NFR BLD: 19.5 % (ref 18–48)
MAGNESIUM SERPL-MCNC: 2.1 MG/DL (ref 1.6–2.6)
MCH RBC QN AUTO: 28.9 PG (ref 27–31)
MCHC RBC AUTO-ENTMCNC: 32.4 G/DL (ref 32–36)
MCV RBC AUTO: 89 FL (ref 82–98)
MONOCYTES # BLD AUTO: 0.9 K/UL (ref 0.3–1)
MONOCYTES NFR BLD: 8.8 % (ref 4–15)
NEUTROPHILS # BLD AUTO: 7.3 K/UL (ref 1.8–7.7)
NEUTROPHILS NFR BLD: 69 % (ref 38–73)
NRBC BLD-RTO: 0 /100 WBC
PHOSPHATE SERPL-MCNC: 4.3 MG/DL (ref 2.7–4.5)
PLATELET # BLD AUTO: 375 K/UL (ref 150–450)
PMV BLD AUTO: 10.5 FL (ref 9.2–12.9)
POTASSIUM SERPL-SCNC: 3.8 MMOL/L (ref 3.5–5.1)
PROT SERPL-MCNC: 6.8 G/DL (ref 6–8.4)
RBC # BLD AUTO: 3.74 M/UL (ref 4.6–6.2)
SODIUM SERPL-SCNC: 138 MMOL/L (ref 136–145)
WBC # BLD AUTO: 10.65 K/UL (ref 3.9–12.7)

## 2024-10-26 PROCEDURE — 63600175 PHARM REV CODE 636 W HCPCS

## 2024-10-26 PROCEDURE — 11000001 HC ACUTE MED/SURG PRIVATE ROOM

## 2024-10-26 PROCEDURE — 36415 COLL VENOUS BLD VENIPUNCTURE: CPT

## 2024-10-26 PROCEDURE — 99223 1ST HOSP IP/OBS HIGH 75: CPT | Mod: ,,, | Performed by: NURSE PRACTITIONER

## 2024-10-26 PROCEDURE — 85025 COMPLETE CBC W/AUTO DIFF WBC: CPT

## 2024-10-26 PROCEDURE — 84100 ASSAY OF PHOSPHORUS: CPT

## 2024-10-26 PROCEDURE — 63600175 PHARM REV CODE 636 W HCPCS: Performed by: NURSE PRACTITIONER

## 2024-10-26 PROCEDURE — 25000003 PHARM REV CODE 250

## 2024-10-26 PROCEDURE — 83735 ASSAY OF MAGNESIUM: CPT

## 2024-10-26 PROCEDURE — 80053 COMPREHEN METABOLIC PANEL: CPT

## 2024-10-26 PROCEDURE — 96372 THER/PROPH/DIAG INJ SC/IM: CPT

## 2024-10-26 RX ORDER — IBUPROFEN 200 MG
24 TABLET ORAL
Status: DISCONTINUED | OUTPATIENT
Start: 2024-10-26 | End: 2024-10-27

## 2024-10-26 RX ORDER — DOXYCYCLINE HYCLATE 100 MG
100 TABLET ORAL 2 TIMES DAILY
Status: DISCONTINUED | OUTPATIENT
Start: 2024-10-26 | End: 2024-10-29 | Stop reason: HOSPADM

## 2024-10-26 RX ORDER — IBUPROFEN 200 MG
16 TABLET ORAL
Status: DISCONTINUED | OUTPATIENT
Start: 2024-10-26 | End: 2024-10-27

## 2024-10-26 RX ORDER — AMOXICILLIN 250 MG
1 CAPSULE ORAL DAILY PRN
Status: DISCONTINUED | OUTPATIENT
Start: 2024-10-26 | End: 2024-10-29

## 2024-10-26 RX ORDER — METHOCARBAMOL 500 MG/1
500 TABLET, FILM COATED ORAL 4 TIMES DAILY PRN
Status: DISCONTINUED | OUTPATIENT
Start: 2024-10-26 | End: 2024-10-29 | Stop reason: HOSPADM

## 2024-10-26 RX ORDER — INSULIN LISPRO 100 [IU]/ML
1-10 INJECTION, SOLUTION INTRAVENOUS; SUBCUTANEOUS
Status: DISCONTINUED | OUTPATIENT
Start: 2024-10-26 | End: 2024-10-27

## 2024-10-26 RX ORDER — DOXYCYCLINE HYCLATE 100 MG
100 TABLET ORAL DAILY
Status: DISCONTINUED | OUTPATIENT
Start: 2024-10-26 | End: 2024-10-26

## 2024-10-26 RX ORDER — SODIUM CHLORIDE, SODIUM LACTATE, POTASSIUM CHLORIDE, CALCIUM CHLORIDE 600; 310; 30; 20 MG/100ML; MG/100ML; MG/100ML; MG/100ML
INJECTION, SOLUTION INTRAVENOUS CONTINUOUS
Status: DISCONTINUED | OUTPATIENT
Start: 2024-10-27 | End: 2024-10-27

## 2024-10-26 RX ORDER — GLUCAGON 1 MG
1 KIT INJECTION
Status: DISCONTINUED | OUTPATIENT
Start: 2024-10-26 | End: 2024-10-27

## 2024-10-26 RX ORDER — ACETAMINOPHEN 325 MG/1
650 TABLET ORAL EVERY 6 HOURS PRN
Status: DISCONTINUED | OUTPATIENT
Start: 2024-10-26 | End: 2024-10-29 | Stop reason: HOSPADM

## 2024-10-26 RX ORDER — INSULIN LISPRO 100 [IU]/ML
1.55 INJECTION, SOLUTION INTRAVENOUS; SUBCUTANEOUS CONTINUOUS
Status: DISCONTINUED | OUTPATIENT
Start: 2024-10-26 | End: 2024-10-27

## 2024-10-26 RX ADMIN — AMLODIPINE BESYLATE 10 MG: 10 TABLET ORAL at 09:10

## 2024-10-26 RX ADMIN — ALUMINUM HYDROXIDE, MAGNESIUM HYDROXIDE, AND SIMETHICONE 30 ML: 200; 200; 20 SUSPENSION ORAL at 11:10

## 2024-10-26 RX ADMIN — ALUMINUM HYDROXIDE, MAGNESIUM HYDROXIDE, AND SIMETHICONE 30 ML: 200; 200; 20 SUSPENSION ORAL at 09:10

## 2024-10-26 RX ADMIN — DOXYCYCLINE HYCLATE 100 MG: 100 TABLET, COATED ORAL at 09:10

## 2024-10-26 RX ADMIN — HEPARIN SODIUM 5000 UNITS: 5000 INJECTION INTRAVENOUS; SUBCUTANEOUS at 02:10

## 2024-10-26 RX ADMIN — GABAPENTIN 300 MG: 300 CAPSULE ORAL at 09:10

## 2024-10-26 RX ADMIN — HYDRALAZINE HYDROCHLORIDE 50 MG: 25 TABLET ORAL at 06:10

## 2024-10-26 RX ADMIN — INSULIN LISPRO 1.55 UNITS/HR: 100 INJECTION, SOLUTION INTRAVENOUS; SUBCUTANEOUS at 12:10

## 2024-10-26 RX ADMIN — PANTOPRAZOLE SODIUM 40 MG: 40 TABLET, DELAYED RELEASE ORAL at 09:10

## 2024-10-26 RX ADMIN — TRAZODONE HYDROCHLORIDE 50 MG: 50 TABLET ORAL at 09:10

## 2024-10-26 RX ADMIN — ALUMINUM HYDROXIDE, MAGNESIUM HYDROXIDE, AND SIMETHICONE 30 ML: 200; 200; 20 SUSPENSION ORAL at 06:10

## 2024-10-26 RX ADMIN — HYDRALAZINE HYDROCHLORIDE 50 MG: 25 TABLET ORAL at 09:10

## 2024-10-26 RX ADMIN — ALUMINUM HYDROXIDE, MAGNESIUM HYDROXIDE, AND SIMETHICONE 30 ML: 200; 200; 20 SUSPENSION ORAL at 04:10

## 2024-10-26 RX ADMIN — HEPARIN SODIUM 5000 UNITS: 5000 INJECTION INTRAVENOUS; SUBCUTANEOUS at 06:10

## 2024-10-26 RX ADMIN — DULOXETINE HYDROCHLORIDE 60 MG: 60 CAPSULE, DELAYED RELEASE ORAL at 09:10

## 2024-10-26 RX ADMIN — HEPARIN SODIUM 5000 UNITS: 5000 INJECTION INTRAVENOUS; SUBCUTANEOUS at 09:10

## 2024-10-26 NOTE — PLAN OF CARE
[facility] - Internal Medicine Telemetry  Initial Discharge Assessment       Primary Care Provider: Andrew Sinclair Jr., MD    Admission Diagnosis: Hyperglycemia [R73.9]  Chest pain [R07.9]  Visit for wound check [Z51.89]    Admission Date: 10/25/2024  Expected Discharge Date:     Transition of Care Barriers: (P) None    Payor: [employer] / Plan: [facility] CHOICE PLUS / Product Type: Commercial /     Extended Emergency Contact Information  Primary Emergency Contact: Juan Roberts   [place]  Mobile Phone: [phone]  Relation: Roommate    Discharge Plan A: (P) Home with family, Home Health  Discharge Plan B: (P) Skilled Nursing Facility      [facility] [employer] [place] - [address] [address]  Phone: [phone] Fax: [phone]    [id number] - [place], [place] - [address]  [address]  [place] [address]  Phone: [phone] Fax: [phone]      Initial Assessment (most recent)       Adult Discharge Assessment - 10/26/24 1611          Discharge Assessment    Assessment Type Discharge Planning Assessment (P)      Confirmed/corrected address, phone number and insurance Yes (P)      Confirmed Demographics Correct on Facesheet (P)      Source of Information patient (P)      Communicated JAYLEEN with patient/caregiver Yes (P)      Reason For Admission Told by PCP to admit due to concerning back and leg wound (P)      People in Home friend(s) (P)      Do you expect to return to your current living situation? Yes (P)      Do you have help at home or someone to help you manage your care at home? Yes (P)      Who are your caregiver(s) and their phone number(s)? Juan Roberts (Roommate)  [phone] (P)      Prior to hospitilization cognitive status: Alert/Oriented (P)      Current cognitive status: Alert/Oriented (P)      Walking or Climbing Stairs Difficulty yes (P)      Walking  "or Climbing Stairs ambulation difficulty, requires equipment (P)      Mobility Management wc, RW (P)      Dressing/Bathing Difficulty yes (P)      Dressing/Bathing bathing difficulty, requires equipment (P)      Dressing/Bathing Management grab bars (P)      Home Accessibility wheelchair accessible (P)      Home Layout Able to live on 1st floor (P)      Equipment Currently Used at Home walker, rolling;wheelchair (P)      Readmission within 30 days? Yes (P)      Patient currently being followed by outpatient case management? No (P)      Do you currently have service(s) that help you manage your care at home? No (P)      Do you take prescription medications? Yes (P)      Do you have prescription coverage? Yes (P)      Do you have any problems affording any of your prescribed medications? No (P)      Is the patient taking medications as prescribed? yes (P)      Who is going to help you get home at discharge? Juan Roberts (Roommate)  [phone] (P)      How do you get to doctors appointments? family or friend will provide (P)      Are you on dialysis? No (P)      Do you take coumadin? No (P)      Discharge Plan A Home with family;Home Health (P)      Discharge Plan B Skilled Nursing Facility (P)      DME Needed Upon Discharge  -- (P)    TBD    Discharge Plan discussed with: Patient (P)      Transition of Care Barriers None (P)                      Readmission Assessment (most recent)       Readmission Assessment - 10/26/24 1608          Readmission    Was this a planned readmission? No     Why were you hospitalized in the last 30 days? wound     Why were you readmitted? Related to previous admission     When you left the hospital how did you feel? "felt ready"     When you left the hospital where did you go? Home with Family     Did patient/caregiver refused recommended DC plan? No     Tell me about what happened between when you left the hospital and the day you returned. Dc'd to [facility], discharged home. Told " by PCP to re-admit due to concerning back and leg wounds.     When did you start not feeling well? weeks later     Did you try to see or did see a doctor or nurse before you came? Yes     Were you seen? Yes     Did you have  a follow-up appointment on discharge? Yes     Did you go? Yes                 Discharge Plan A and Plan B have been determined by review of patient's clinical status, future medical and therapeutic needs, and coverage/benefits for post-acute care in coordination with multidisciplinary team members.                     RENAY Devlin, LMSW  [facility]  Case Management  Ext. [phone]

## 2024-10-26 NOTE — PLAN OF CARE
Problem: Skin Injury Risk Increased  Goal: Skin Health and Integrity  Outcome: Progressing     Problem: Adult Inpatient Plan of Care  Goal: Plan of Care Review  Outcome: Progressing  Goal: Patient-Specific Goal (Individualized)  Outcome: Progressing  Goal: Absence of Hospital-Acquired Illness or Injury  Outcome: Progressing  Goal: Optimal Comfort and Wellbeing  Outcome: Progressing  Goal: Readiness for Transition of Care  Outcome: Progressing     Problem: Diabetes Comorbidity  Goal: Blood Glucose Level Within Targeted Range  Outcome: Progressing     Problem: Wound  Goal: Optimal Coping  Outcome: Progressing  Goal: Optimal Functional Ability  Outcome: Progressing  Goal: Absence of Infection Signs and Symptoms  Outcome: Progressing  Goal: Improved Oral Intake  Outcome: Progressing  Goal: Optimal Pain Control and Function  Outcome: Progressing  Goal: Skin Health and Integrity  Outcome: Progressing  Goal: Optimal Wound Healing  Outcome: Progressing     Problem: Fall Injury Risk  Goal: Absence of Fall and Fall-Related Injury  Outcome: Progressing

## 2024-10-26 NOTE — CONSULTS
[facility] Telemetry  Endocrinology  Diabetes Consult Note    Consult Requested by: Tonya Holland MD   Reason for admit: Surgical wound breakdown    HISTORY OF PRESENT ILLNESS:  Reason for Consult: Management of T2DM, Hyperglycemia     Diabetes diagnosis year: 1995    Home Diabetes Medications:      Humalog via OmniPod insulin pump  Mounjaro 10 mg weekly     Current pump settings:  Basal 12 am to 2.3 and 6 am to 1.55  ICR to 3 at 12 am, 2 at 4 pm  ISF to 1:20   AIT 3 hrs  Target 110-120    Patient had anaphylaxis reaction to SGLT2 inhibitors specifically Invokana     Lab Results   Component Value Date    LABA1C 10.8 (H) 08/15/2016    HGBA1C 8.5 (H) 09/06/2024       How often checking glucose at home? Dexcom G6    BG readings on regimen: 130-200s  Hypoglycemia on the regimen?  No  Missed doses on regimen?  No    Diabetes Complications include:     Hyperglycemia and Diabetic peripheral neuropathy         Complicating diabetes co morbidities:   HLD, HTN, Obesity     HPI:   Patient is a 64 y.o. male with a diagnosis of insulin dependent type 2 diabetes mellitus with insulin pump, polyneuropathy due to type 2 diabetes mellitus, peripheral vascular disease, hyperlipidemia, And fall resulting in ORIF of right ankle in July of this year. Course parascapular free flap that did not take that ultimately resulted in getting BKA done 10/1. He was also found to have MSSA at that time. He was sent to Rehab after BKA procedure was done, with 2 week course IV oxacillin. Pt d/c from rehab on 10/20, and went to f/u w/ Plastic surgery today who noticed that he now has a non-healing wound to his back where his parascapular flap was extracted. He had also been complaining of some N/V and constipation. He was sent to the ED for admission, so he could undergo workup to r/o SBO as well as undergo debridement of his BKA and back wound with wound vac placement. In the ED, pt was afebrile HDS. CBC was done that showed  WBC of 11, and CMP showed elevated glucose at 269. CT abd/pelvis was obtained to r/o SBO which was negative. Plastic Surgery team planning to see pt and do inpatient debridement procedure.Endocrinology consulted for management of T2DM.          Interval HPI:   Overnight events: No acute events overnight. Patient in room 1110/1110 A. Blood glucose stable. BG at and above goal on current insulin regimen (Home Insulin Pump). Steroid use- None .    Renal function- Normal   Vasopressors-  None       Endocrine will continue to follow and manage insulin orders inpatient.         Diet diabetic 2000 Calories (up to 75 gm per meal)     Eatin%  Nausea: No  Hypoglycemia and intervention: No  Fever: No  TPN and/or TF: No  If yes, type of TF/TPN and rate: n/a    PMH, PSH, FH, SH reviewed     ROS:  Constitutional: Negative for weight changes.  Eyes: Negative for visual disturbance.  Respiratory: Negative for cough.   Cardiovascular: Negative for chest pain.  Gastrointestinal: Negative for nausea.  Endocrine: Negative for polyuria, polydipsia.  Musculoskeletal: Negative for back pain.  Skin: Negative for rash.  Neurological: Negative for syncope.  Psychiatric/Behavioral: Negative for depression.      Review of Systems    Current Medications and/or Treatments Impacting Glycemic Control  Immunotherapy:    Immunosuppressants       None          Steroids:   Hormones (From admission, onward)      None          Pressors:    Autonomic Drugs (From admission, onward)      None          Hyperglycemia/Diabetes Medications:   Antihyperglycemics (From admission, onward)      Start     Stop Route Frequency Ordered    10/26/24 1245  insulin lispro insulin pump from home        Question Answer Comment   Target number 110    Basal Rate #1 1.55    Basal rate #1 time 3427-7215    Basal Rate #2 2.3    Basal rate #2 time 8966-9308        -- SubQ Continuous 10/26/24 1140    10/26/24 1239  insulin lispro insulin pump from home 1-10 Units       "  Question Answer Comment   Target number 110    Carbohydrate coverage #1 1:3    Carbohydrate coverage #1 time 0000    Carbohydrate coverage #2 1:2    Carbohydrate coverage #2 time 1600    Sensitivity #1 1:20    Sensitivity #1 time [id number]        -- SubQ As needed (PRN) 10/26/24 1140             PHYSICAL EXAMINATION:  Vitals:    10/26/24 0727   BP: (!) 148/75   Pulse: 102   Resp: 18   Temp: 99 °F (37.2 °C)     Body mass index is 33.95 kg/m².     Physical Exam  Constitutional: Well developed, well nourished, NAD.  ENT: External ears no masses with nose patent; normal hearing.  Neck: Supple; trachea midline.  Cardiovascular: Normal heart sounds  Lungs: Normal effort; lungs anterior bilaterally clear to auscultation.  Abdomen: Soft, no masses, no hernias.  MS: No clubbing or cyanosis of nails noted; unable to assess gait.  Skin: Wounds noted. Injection sites are ok. No lipo hypertropthy or atrophy.  Psychiatric: Good judgement and insight; normal mood and affect.  Neurological: Cranial nerves are grossly intact.   Foot: Nails in good condition, R BKA noted.         Labs Reviewed and Include   Recent Labs   Lab 10/26/24  0544   *   CALCIUM 9.2   ALBUMIN 2.4*   PROT 6.8      K 3.8   CO2 28      BUN 14   CREATININE 0.8   ALKPHOS 116   ALT 9*   AST 12   BILITOT 0.3     Lab Results   Component Value Date    WBC 10.65 10/26/2024    HGB 10.8 (L) 10/26/2024    HCT 33.3 (L) 10/26/2024    MCV 89 10/26/2024     10/26/2024     No results for input(s): "TSH", "FREET4" in the last 168 hours.  Lab Results   Component Value Date    HGBA1C 8.5 (H) 09/06/2024       Nutritional status:   Body mass index is 33.95 kg/m².  Lab Results   Component Value Date    ALBUMIN 2.4 (L) 10/26/2024    ALBUMIN 2.7 (L) 10/25/2024    ALBUMIN 1.4 (L) 10/04/2024     Lab Results   Component Value Date    PREALBUMIN 3 (L) 09/06/2024    PREALBUMIN 13 (L) 07/18/2024       Estimated Creatinine Clearance: 98.8 mL/min (based on SCr of " 0.8 mg/dL).    Accu-Checks  Recent Labs     10/25/24  1859   POCTGLUCOSE 191*        ASSESSMENT and PLAN    Endocrine  Class 1 obesity due to excess calories with serious comorbidity and body mass index (BMI) of 34.0 to 34.9 in adult  Body mass index is 33.95 kg/m².  May increase insulin resistance.         Insulin pump status  Insulin Pump:  Patient has the ability and wherewithal to manage the pump.  Order has been placed to notify nurse of patient using own pump. (Order Set: Adult and Pediatric Home Insulin Pump)      Humalog via OmniPod insulin pump  Current pump settings:  Basal 12 am to 2.3 and 6 am to 1.55  ICR to 3 at 12 am, 2 at 4 pm  ISF to 1:20   AIT 3 hrs  Target 110-120        Pump type:    Change infusion set: Every 1-2 days (10/25/24)   Change insertion site: with change of infusion set. (10/25/24)   Location of insertion site: Abdomen  State of insertion site: appears clean    Uncontrolled type 2 diabetes mellitus with hyperglycemia  BG goal 140-180    Patient to remain on home insulin pump at this time (see below for settings) Patient has supplies at bedside and consent forms signed and placed in chart. Patient to notify Endocrine in the event of pump malfunction.  BG monitoring ac/hs/0200    In the event of pump malfunction- plan to start the following  (based on previous hospital requirements)   - Lantus (Insulin Glargine) 16 units daily   - Novolog 2-6 units TIDWM (Administer 2 units if patient eats 25% of meal, 4  units if patient eats 50% of meal, administer 5 units if the patient eats 75% of meal and administer and 6 units if patient eats 100% of meal. Hold if patient eats less than 25% of meal).  - [facility] SSI (150/25)  - BG checks AC/HS      ** Please call Endocrine for any BG related issues **        Orthopedic  * Surgical wound breakdown  Managed per primary team  Optimize BG control            Plan discussed with patient at bedside.     Meg Penaloza NP  Endocrinology  [facility]  Medicine Telemetry

## 2024-10-26 NOTE — SUBJECTIVE & OBJECTIVE
Interval HPI:   Overnight events: No acute events overnight. Patient in room 1110/1110 A. Blood glucose stable. BG at and above goal on current insulin regimen (Home Insulin Pump). Steroid use- None .    Renal function- Normal   Vasopressors-  None       Endocrine will continue to follow and manage insulin orders inpatient.         Diet diabetic 2000 Calories (up to 75 gm per meal)     Eatin%  Nausea: No  Hypoglycemia and intervention: No  Fever: No  TPN and/or TF: No  If yes, type of TF/TPN and rate: n/a    PMH, PSH, FH, SH reviewed     ROS:  Constitutional: Negative for weight changes.  Eyes: Negative for visual disturbance.  Respiratory: Negative for cough.   Cardiovascular: Negative for chest pain.  Gastrointestinal: Negative for nausea.  Endocrine: Negative for polyuria, polydipsia.  Musculoskeletal: Negative for back pain.  Skin: Negative for rash.  Neurological: Negative for syncope.  Psychiatric/Behavioral: Negative for depression.      Review of Systems    Current Medications and/or Treatments Impacting Glycemic Control  Immunotherapy:    Immunosuppressants       None          Steroids:   Hormones (From admission, onward)      None          Pressors:    Autonomic Drugs (From admission, onward)      None          Hyperglycemia/Diabetes Medications:   Antihyperglycemics (From admission, onward)      Start     Stop Route Frequency Ordered    10/26/24 1245  insulin lispro insulin pump from home        Question Answer Comment   Target number 110    Basal Rate #1 1.55    Basal rate #1 time 6302-4985    Basal Rate #2 2.3    Basal rate #2 time 9543-3338        -- SubQ Continuous 10/26/24 1140    10/26/24 1239  insulin lispro insulin pump from home 1-10 Units        Question Answer Comment   Target number 110    Carbohydrate coverage #1 1:3    Carbohydrate coverage #1 time 0000    Carbohydrate coverage #2 1:2    Carbohydrate coverage #2 time 1600    Sensitivity #1 1:20    Sensitivity #1 time [id number]         -- SubQ As needed (PRN) 10/26/24 1140             PHYSICAL EXAMINATION:  Vitals:    10/26/24 0727   BP: (!) 148/75   Pulse: 102   Resp: 18   Temp: 99 °F (37.2 °C)     Body mass index is 33.95 kg/m².     Physical Exam  Constitutional: Well developed, well nourished, NAD.  ENT: External ears no masses with nose patent; normal hearing.  Neck: Supple; trachea midline.  Cardiovascular: Normal heart sounds  Lungs: Normal effort; lungs anterior bilaterally clear to auscultation.  Abdomen: Soft, no masses, no hernias.  MS: No clubbing or cyanosis of nails noted; unable to assess gait.  Skin: Wounds noted. Injection sites are ok. No lipo hypertropthy or atrophy.  Psychiatric: Good judgement and insight; normal mood and affect.  Neurological: Cranial nerves are grossly intact.   Foot: Nails in good condition, R BKA noted.

## 2024-10-26 NOTE — PROGRESS NOTES
[facility] Medicine [facility] Medicine  Progress Note    Patient Name: Cortes Schroeder  MRN: [id number]  Patient Class: OP- Observation   Admission Date: 10/25/2024  Length of Stay: 0 days  Attending Physician: Tonya Holland MD  Primary Care Provider: Andrew Sinclair Jr., MD        Subjective:     Principal Problem:Surgical wound breakdown        HPI:  Mr. Cortes Schroeder is a 64 y.o. male with previous medical history of insulin dependent type 2 diabetes mellitus with insulin pump, polyneuropathy due to type 2 diabetes mellitus, peripheral vascular disease, hyperlipidemia, And fall resulting in ORIF of right ankle in July of this year. Course parascapular free flap that did not take that ultimately resulted in getting BKA done 10/1. He was also found to have MSSA at that time. He was sent to Rehab after BKA procedure was done, with 2 week course IV oxacillin. Pt d/c from rehab on 10/20, and went to f/u w/ Plastic surgery today who noticed that he now has a non-healing wound to his back where his parascapular flap was extracted. He had also been complaining of some N/V and constipation. He was sent to the ED for admission, so he could undergo workup to r/o SBO as well as undergo debridement of his BKA and back wound with wound vac placement. At bedside, pt states that he has had 3-4 days of N/V and constipation without a BM, which resolved earlier today and he started having regular BM again. He states that he did notice some drainage from his back wound 3 days ago that was mostly clear, with some blood. However, he denied any other sx of fever, chills, chest pain, SOB, or dysuria.      In the ED, pt was afebrile HDS. CBC was done that showed WBC of 11, and CMP showed elevated glucose at 269. CT abd/pelvis was obtained to r/o SBO which was negative. Plastic Surgery team planning to see pt and do inpatient debridement procedure.    Overview/Hospital Course:  Mr. Cortes Schroeder is a 64 y.o.  male with previous medical history of insulin dependent type 2 diabetes mellitus with insulin pump, polyneuropathy due to type 2 diabetes mellitus, peripheral vascular disease, hyperlipidemia, And fall resulting in ORIF of right ankle in July of this year. Course parascapular free flap that did not take that ultimately resulted in getting BKA done 10/1. He was also found to have MSSA at that time. He was sent to Rehab after BKA procedure was done, with 2 week course IV oxacillin. Pt d/c from rehab on 10/20, and went to f/u w/ Plastic surgery today who noticed that he now has a non-healing wound to his back where his parascapular flap was extracted. He had also been complaining of some N/V and constipation. He was sent to the ED for admission, so he could undergo workup to r/o SBO as well as undergo debridement of his BKA and back wound with wound vac placement. On ED admission, CT abd/pelvis ruled out bowel obstruction and pt N/V resolved. He was admitted w/ consult placed to Plastic Surgery for wound debridement, as well as consult to Endocrinology for insulin pump and glucose management.    Interval History: NAEO. Pt feeling well this morning and has had no further N/V or any new sx. Awaiting decision from Plastic Surgery team about when he will go for debridement.    Review of Systems  Objective:     Vital Signs (Most Recent):  Temp: 99 °F (37.2 °C) (10/26/24 0727)  Pulse: 102 (10/26/24 0727)  Resp: 18 (10/26/24 0727)  BP: (!) 148/75 (10/26/24 0727)  SpO2: 97 % (10/26/24 0727) Vital Signs (24h Range):  Temp:  [98.2 °F (36.8 °C)-99 °F (37.2 °C)] 99 °F (37.2 °C)  Pulse:  [] 102  Resp:  [13-20] 18  SpO2:  [97 %-100 %] 97 %  BP: (141-174)/(65-82) 148/75     Weight: 92.5 kg (204 lb)  Body mass index is 33.95 kg/m².    Intake/Output Summary (Last 24 hours) at 10/26/2024 0823  Last data filed at 10/26/2024 0554  Gross per 24 hour   Intake --   Output 400 ml   Net -400 ml         Physical Exam  HENT:      Head:  Atraumatic.   Eyes:      Extraocular Movements: Extraocular movements intact.      Pupils: Pupils are equal, round, and reactive to light.   Cardiovascular:      Rate and Rhythm: Normal rate and regular rhythm.      Pulses: Normal pulses.      Heart sounds: Normal heart sounds.   Pulmonary:      Effort: Pulmonary effort is normal.      Breath sounds: Normal breath sounds.   Abdominal:      General: Bowel sounds are normal.      Palpations: Abdomen is soft.   Musculoskeletal:      Comments: -poorly healed wound in place to left scapular area at previous flap site. Staples still in place, and minimal clear yellow drainage present  -R BKA site w/ poor healing wound   Neurological:      Mental Status: He is alert and oriented to person, place, and time.             Significant Labs: All pertinent labs within the past 24 hours have been reviewed.  CBC:   Recent Labs   Lab 10/25/24  1524 10/25/24  1531 10/26/24  0544   WBC 12.40  --  10.65   HGB 11.5*  --  10.8*   HCT 35.7* 37 33.3*     --  375     CMP:   Recent Labs   Lab 10/25/24  1524 10/26/24  0544   * 138   K 4.7 3.8   CL 96 101   CO2 26 28   * 112*   BUN 20 14   CREATININE 1.0 0.8   CALCIUM 9.6 9.2   PROT 7.6 6.8   ALBUMIN 2.7* 2.4*   BILITOT 0.2 0.3   ALKPHOS 135 116   AST 16 12   ALT 10 9*   ANIONGAP 11 9       Significant Imaging: I have reviewed all pertinent imaging results/findings within the past 24 hours.    Assessment/Plan:      * Surgical wound breakdown  Pt with poor wound healing of right BKA site as well as left scapular region where free flap was previously extracted. He has already completed 2 week course oxacillin for MSSA. Now has started noticing more drainage especially from left scapular wound site. Seen by Plastics today, and sent to ED for admission to undergo debridement and wound vac placement    -Plastics consult placed for debridement of R BKA and Left scapular wound, planning for procedure early this week  -discuss  whether abx coverage is necessary for draining wounds at this time   -Holding home AC for possible procedure  -Consult wound care for management once pt has debridement and wound vac placed      Hypertension  On Losartan, Lasix, hydralazine at home and HTN well controlled. Pt recently prescribed amlodipine but has not picked it up yet    -restart Amlodipine, Lasix, Hydral while inpatient.   -Hold losartan at this time and restart if additional BP meds needed     Insulin pump status  Pt with DM2 with long term use of insulin pump. Pt has insulin pump and CGM with him.     -Consult to endocrine placed for inpatient mgmt, and recs on whether home insulin pump should be used  -POCT glucose checks ACHS  -SSI w/ ACHS checks      Gastroesophageal reflux disease without esophagitis  Continue home PPI inpatient      Neuropathy  Controlled with gabapentin at home, continue inpatient      Anxiety  -on Duloxetine at home, restart inpatient        VTE Risk Mitigation (From admission, onward)           Ordered     heparin (porcine) injection 5,000 Units  Every 8 hours         10/25/24 1753     IP VTE HIGH RISK PATIENT  Once         10/25/24 1753     Place sequential compression device  Until discontinued         10/25/24 1753                    Discharge Planning   JAYLEEN:      Code Status: Full Code   Is the patient medically ready for discharge?:     Reason for patient still in hospital (select all that apply): Patient trending condition and Treatment                     Duane Ospina MD  Department of Hospital Medicine   [facility] - [facility] Medicine Telemetry

## 2024-10-26 NOTE — HPI
Reason for Consult: Management of T2DM, Hyperglycemia     Diabetes diagnosis year: 1995    Home Diabetes Medications:      Humalog via OmniPod insulin pump  Mounjaro 10 mg weekly     Current pump settings:  Basal 12 am to 2.3 and 6 am to 1.55  ICR to 3 at 12 am, 2 at 4 pm  ISF to 1:20   AIT 3 hrs  Target 110-120    Patient had anaphylaxis reaction to SGLT2 inhibitors specifically Invokana     Lab Results   Component Value Date    LABA1C 10.8 (H) 08/15/2016    HGBA1C 8.5 (H) 09/06/2024       How often checking glucose at home? Dexcom G6    BG readings on regimen: 130-200s  Hypoglycemia on the regimen?  No  Missed doses on regimen?  No    Diabetes Complications include:     Hyperglycemia and Diabetic peripheral neuropathy         Complicating diabetes co morbidities:   HLD, HTN, Obesity     HPI:   Patient is a 64 y.o. male with a diagnosis of insulin dependent type 2 diabetes mellitus with insulin pump, polyneuropathy due to type 2 diabetes mellitus, peripheral vascular disease, hyperlipidemia, And fall resulting in ORIF of right ankle in July of this year. Course parascapular free flap that did not take that ultimately resulted in getting BKA done 10/1. He was also found to have MSSA at that time. He was sent to Rehab after BKA procedure was done, with 2 week course IV oxacillin. Pt d/c from rehab on 10/20, and went to f/u w/ Plastic surgery today who noticed that he now has a non-healing wound to his back where his parascapular flap was extracted. He had also been complaining of some N/V and constipation. He was sent to the ED for admission, so he could undergo workup to r/o SBO as well as undergo debridement of his BKA and back wound with wound vac placement. In the ED, pt was afebrile HDS. CBC was done that showed WBC of 11, and CMP showed elevated glucose at 269. CT abd/pelvis was obtained to r/o SBO which was negative. Plastic Surgery team planning to see pt and do inpatient debridement procedure.Endocrinology  consulted for management of T2DM.

## 2024-10-26 NOTE — ASSESSMENT & PLAN NOTE
Pt with DM2 with long term use of insulin pump. Pt has insulin pump and CGM with him.     -Consult to endocrine placed for inpatient mgmt, and recs on whether home insulin pump should be used  -POCT glucose checks ACHS  -SSI w/ ACHS checks

## 2024-10-26 NOTE — ASSESSMENT & PLAN NOTE
BG goal 140-180    Patient to remain on home insulin pump at this time (see below for settings) Patient has supplies at bedside and consent forms signed and placed in chart. Patient to notify Endocrine in the event of pump malfunction.  BG monitoring ac/hs/0200    In the event of pump malfunction- plan to start the following  (based on previous hospital requirements)   - Lantus (Insulin Glargine) 16 units daily   - Novolog 2-6 units TIDWM (Administer 2 units if patient eats 25% of meal, 4  units if patient eats 50% of meal, administer 5 units if the patient eats 75% of meal and administer and 6 units if patient eats 100% of meal. Hold if patient eats less than 25% of meal).  - [facility] SSI (150/25)  - BG checks AC/HS      ** Please call Endocrine for any BG related issues **

## 2024-10-26 NOTE — HOSPITAL COURSE
Mr. Cortes Schroeder is a 64 y.o. male with previous medical history of insulin dependent type 2 diabetes mellitus with insulin pump, polyneuropathy due to type 2 diabetes mellitus, peripheral vascular disease, hyperlipidemia, And fall resulting in ORIF of right ankle in July of this year. Course parascapular free flap that did not take that ultimately resulted in getting BKA done 10/1. He was also found to have MSSA at that time. He was sent to Rehab after BKA procedure was done, with 2 week course IV oxacillin. Pt d/c from rehab on 10/20, and went to f/u w/ Plastic surgery today who noticed that he now has a non-healing wound to his back where his parascapular flap was extracted. He had also been complaining of some N/V and constipation. He was sent to the ED for admission, so he could undergo workup to r/o SBO as well as undergo debridement of his BKA and back wound with wound vac placement. On ED admission, CT abd/pelvis ruled out bowel obstruction and pt N/V resolved. He was admitted w/ consult placed to Plastic Surgery for wound debridement, as well as consult to Endocrinology for insulin pump and glucose management. Per plastic surgery team, added on Doxy for possible infected wound to get skin coverage. Pt underwent debridement 10/27 w/ plastic surgery. Infectious Disease team saw pt and recommended discharging w/ PO Doxycycline 100 BID plus PO Augmentin 875 BID x10 day course DON 11/08. Wound care saw pt, and he now has outpatient wound care referral placed for discharge so that they can continue to follow. 10/29 pt was determined to be medically ready for discharge. He was sent home with wound vac in place. Outpatient referral to follow up with wound care/ PT is in, and prescriptions sent to his home pharmacy to finish 10 day course PO antibiotic regimen.

## 2024-10-26 NOTE — PLAN OF CARE
Problem: Skin Injury Risk Increased  Goal: Skin Health and Integrity  10/26/2024 1849 by Torie Pappas RN  Outcome: Progressing  10/26/2024 1449 by Torie Pappas RN  Outcome: Progressing     Problem: Adult Inpatient Plan of Care  Goal: Plan of Care Review  10/26/2024 1849 by Torie Pappas RN  Outcome: Progressing  10/26/2024 1449 by Torie Pappas RN  Outcome: Progressing  Goal: Patient-Specific Goal (Individualized)  10/26/2024 1849 by Torie Pappas RN  Outcome: Progressing  10/26/2024 1449 by Torie Pappas RN  Outcome: Progressing  Goal: Absence of Hospital-Acquired Illness or Injury  10/26/2024 1849 by Torie Pappas RN  Outcome: Progressing  10/26/2024 1449 by Torie Pappas RN  Outcome: Progressing  Goal: Optimal Comfort and Wellbeing  10/26/2024 1849 by Torie Pappas RN  Outcome: Progressing  10/26/2024 1449 by Torie Pappas RN  Outcome: Progressing  Goal: Readiness for Transition of Care  10/26/2024 1849 by Torie Pappas RN  Outcome: Progressing  10/26/2024 1449 by Torie Pappas RN  Outcome: Progressing     Problem: Diabetes Comorbidity  Goal: Blood Glucose Level Within Targeted Range  10/26/2024 1849 by Torie Pappas RN  Outcome: Progressing  10/26/2024 1449 by Torie Pappas RN  Outcome: Progressing     Problem: Wound  Goal: Optimal Coping  10/26/2024 1849 by Torie Pappas RN  Outcome: Progressing  10/26/2024 1449 by Torie Pappas RN  Outcome: Progressing  Goal: Optimal Functional Ability  10/26/2024 1849 by Torie Pappas RN  Outcome: Progressing  10/26/2024 1449 by Torie Pappas RN  Outcome: Progressing  Goal: Absence of Infection Signs and Symptoms  10/26/2024 1849 by Torie Pappas RN  Outcome: Progressing  10/26/2024 1449 by Torie Pappas RN  Outcome: Progressing  Goal: Improved Oral Intake  10/26/2024 1849 by Torie Pappas RN  Outcome: Progressing  10/26/2024 1449 by Torie Pappas, FRANCHESKA  Outcome:  Progressing  Goal: Optimal Pain Control and Function  10/26/2024 1849 by Torie Pappas RN  Outcome: Progressing  10/26/2024 1449 by Torie Pappas RN  Outcome: Progressing  Goal: Skin Health and Integrity  10/26/2024 1849 by Torie Pappas RN  Outcome: Progressing  10/26/2024 1449 by Torie Pappas RN  Outcome: Progressing  Goal: Optimal Wound Healing  10/26/2024 1849 by Troie Pappas RN  Outcome: Progressing  10/26/2024 1449 by Torie Pappas RN  Outcome: Progressing     Problem: Fall Injury Risk  Goal: Absence of Fall and Fall-Related Injury  10/26/2024 1849 by Torie Pappas RN  Outcome: Progressing  10/26/2024 1449 by Torie Pappas RN  Outcome: Progressing

## 2024-10-26 NOTE — ASSESSMENT & PLAN NOTE
On Losartan, Lasix, hydralazine at home and HTN well controlled. Pt recently prescribed amlodipine but has not picked it up yet    -restart Amlodipine, Lasix, Hydral while inpatient.   -Hold losartan at this time and restart if additional BP meds needed

## 2024-10-26 NOTE — CONSULTS
Plastic and Reconstructive Surgery   Consult Note    Date of Consultation:   10/26/2024    Reason for Consultation:  Left upper back wound    History of Present Illness:  Mr. Cortes Schroeder is a 64 y.o. male with PMH of DMII, s/p orif of right ankle, s/p failed free flap to right ankle and right bka who presents with wound of left upper back and right bka incision. Patient had failed free flap that resulted with right BKA on 10/1. He was also found to have MSSA at that time. He was sent to Rehab after R BKA with 2 week course IV oxacillin. Pt was d/c from rehab on 10/20. He followed up yesterday in clinic and noticed that he now has a non-healing wound to his back where his parascapular flap was taken. He had also been complaining of some N/V and constipation. He was sent to the ED for admission, so he could undergo workup to r/o SBO as well as undergo debridement of his R BKA and left upper back wound. Patient reports n/v has resolved and now has bowel function. He denies any other sx of fever, chills, chest pain, SOB, or dysuria.     Past Medical History:    has a past medical history of Anxiety (12/18/2012), Back pain (12/18/2012), Cataract, Chronic pain syndrome (04/24/2016), Diabetes type 2, controlled (02/20/2016), Diabetic retinopathy, DM (diabetes mellitus) (12/18/2012), DM (diabetes mellitus), type 2, uncontrolled (11/16/2013), Gastroesophageal reflux disease without esophagitis (02/20/2016), Hyperlipidemia (12/18/2012), Insomnia (08/07/2014), and Neuropathy (11/16/2013).    Past Surgical History:    has a past surgical history that includes Back surgery (2003); Epidural steroid injection (N/A, 1/16/2019); Epidural steroid injection (N/A, 2/20/2019); Cataract extraction; injection, spine, lumbosacral, transforaminal approach (Bilateral, 6/14/2024); application, external fixation device, for ankle fracture (Right, 7/18/2024); closed reduction, fracture, ankle, trimalleolar (Right, 7/18/2024); Open reduction  and internal fixation (ORIF) of fracture of distal radius (Left, 7/19/2024); Open reduction and internal fixation (ORIF) of injury of ankle (Right, 7/26/2024); Removal of external fixation device (Right, 7/26/2024); Fixation of syndesmosis of ankle (Right, 7/26/2024); Application of wound vacuum-assisted closure device (Right, 9/6/2024); irrigation and debridement (Right, 9/6/2024); Irrigation and debridement of lower extremity (Right, 9/9/2024); Arthrotomy of ankle (Right, 9/9/2024); Angiography of lower extremity (Right, 9/17/2024); Replacement of wound vacuum-assisted closure device (Right, 9/17/2024); Replacement of wound vacuum-assisted closure device (Right, 9/20/2024); irrigation and debridement (Right, 9/20/2024); Replacement of wound vacuum-assisted closure device (Right, 9/23/2024); Ankle hardware removal (Right, 9/23/2024); Flap procedure (Right, 9/26/2024); and Below knee amputation of lower extremity (Right, 10/1/2024).    Social History:  Social History     Tobacco Use    Smoking status: Never    Smokeless tobacco: Never   Substance Use Topics    Alcohol use: Yes     Alcohol/week: 1.0 standard drink of alcohol     Types: 1 Glasses of wine per week     Comment: occasionally     Social History     Substance and Sexual Activity   Drug Use No       Family History:  Family History   Problem Relation Name Age of Onset    Cataracts Father      Hypertension Father      Parkinsonism Father      Alzheimer's disease Father      Migraines Mother      Hypertension Mother      Heart attack Mother      Amblyopia Neg Hx      Blindness Neg Hx      Glaucoma Neg Hx      Macular degeneration Neg Hx      Retinal detachment Neg Hx      Strabismus Neg Hx         Allergies:  Review of patient's allergies indicates:   Allergen Reactions    Invokana [canagliflozin] Anaphylaxis    Percocet [oxycodone-acetaminophen] Nausea Only and Hallucinations    Biaxin [clarithromycin]     Hydrocodone Other (See Comments)      Dizzy/nausea/hallucinations    Sulfa (sulfonamide antibiotics) Nausea Only and Rash       Home Medications:  Prior to Admission medications    Medication Sig Start Date End Date Taking? Authorizing Provider   amLODIPine (NORVASC) 10 MG tablet Take 1 tablet (10 mg total) by mouth once daily. 10/5/24  Yes Raisa Kearns MD   apixaban (ELIQUIS) 2.5 mg Tab Take 1 tablet (2.5 mg total) by mouth 2 (two) times daily. 10/4/24  Yes Raisa Kearns MD   0.9% NaCl SolP 500 mL with oxacillin 1 gram SolR 12 g Inject 12 g into the vein once daily. End date 10/20/2024. 10/4/24   Raisa Kearns MD   acetaminophen (TYLENOL) 500 MG tablet Take 2 tablets (1,000 mg total) by mouth every 8 (eight) hours. 10/4/24   Raisa Kearns MD   aspirin (ECOTRIN) 81 MG EC tablet Take 1 tablet (81 mg total) by mouth once daily. 10/4/24 10/4/25  Raisa Kearns MD   atorvastatin (LIPITOR) 40 MG tablet Take 40 mg by mouth once daily.    Provider, Historical   blood-glucose sensor (DEXCOM G6 SENSOR) [place] Change sensor every 10 days 1/20/21 1/20/22  Tavares Williamson MD   blood-glucose transmitter (DEXCOM G6 TRANSMITTER) [place] Change transmitter every 3 months 1/20/21 1/20/22  Tavares Williamson MD   calcium carbonate (TUMS) 200 mg calcium (500 mg) chewable tablet Take 2 tablets (1,000 mg total) by mouth 3 (three) times daily as needed for Heartburn. 10/4/24 10/4/25  Raisa Kearns MD   celecoxib (CELEBREX) 200 MG capsule Take 1 capsule (200 mg total) by mouth once daily. 7/23/24   Maria Del Rosario Medina MD   cyanocobalamin (VITAMIN B-12) 1000 MCG tablet Take 1,000 mcg by mouth once daily.    Provider, Historical   DULoxetine (CYMBALTA) 60 MG capsule Take 1 capsule (60 mg total) by mouth once daily. 11/30/22   Don Artis MD   emtricitabine-tenofovir 200-300 mg (TRUVADA) 200-300 mg Tab Take 1 tablet by mouth once daily. 2/8/23   Don Artis MD   fish oil-omega-3 fatty acids 300-1,000 mg capsule Take 1 capsule by  mouth 2 (two) times daily.    Provider, Historical   furosemide (LASIX) 40 MG tablet Take 1 tablet (40 mg total) by mouth once daily. 10/5/24   Raisa Kearns MD   gabapentin (NEURONTIN) 300 MG capsule Take 1 capsule (300 mg total) by mouth 2 (two) times daily. 10/4/24   Raisa Kearns MD   HUMALOG U-100 INSULIN 100 unit/mL injection 1:20 U PRN high blood sugar and snacks. Correction dose- Enter carb coverage intake upon administration  Target number 120 Carbohydrate coverage #1 1:2 Carbohydrate coverage #1 time [id number] Carbohydrate coverage #2 1:3 Carbohydrate coverage #2 time [phone] Carbohydrate coverage #3 Carbohydrate coverage #3 time Carbohydrate coverage #4 Carbohydrate coverage #4 time Sensitivity #1 1:20 Sensitivity #1 time [id number] Sensitivity #2 Sensitivity #2 time Sensitivity #3 Sensitivity #3 time Sensitivity #4 Sensitivity #4 time Sensitivity #5 Sensitivity #5 time Order Questions Question Answer Comment       50 U SQ continuous  Target number 120 Basal Rate #1 2.3 Basal rate #1 time 7811-2639 Basal Rate #2 1.55 Basal rate #2 time [id number] Basal rate #3 Basal rate #3 time Basal rate #4 Basal rate #4 time Basal rate #5 Basal rate #5 time 7/23/24   Maria Del Rosario Medina MD   hydrALAZINE (APRESOLINE) 50 MG tablet Take 1 tablet (50 mg total) by mouth every 8 (eight) hours. 10/4/24   Raisa Kearns MD   losartan (COZAAR) 25 MG tablet Take 2 tablets (50 mg total) by mouth once daily. 10/4/24   Raisa Kearns MD   magnesium oxide (MAG-OX) 400 mg (241.3 mg magnesium) tablet Take 0.5 tablets (200 mg total) by mouth once daily. 7/23/24   Maria Del Rosario Medina MD   melatonin (MELATIN) 3 mg tablet Take 2 tablets (6 mg total) by mouth nightly as needed for Insomnia. 7/23/24   Maria Del Rosario Medina MD   methocarbamoL (ROBAXIN) 500 MG Tab Take 1 tablet (500 mg total) by mouth every 8 (eight) hours. 10/4/24   Raisa Kearns MD   morphine (MSIR) 15 MG tablet Take 1 tablet (15 mg total)  by mouth every 4 (four) hours as needed (Severe pain 7-10/10). 10/4/24   Raisa Kearns MD   MOUNJARO 10 mg/0.5 mL PnIj Inject 10 mg into the skin once a week. HOLD until all surgeries completed and out of rehab 7/23/24   Maria Del Rosario Medina MD   nortriptyline (PAMELOR) 50 MG capsule Take 1 capsule (50 mg total) by mouth nightly. 11/30/22   Don Artis MD   ondansetron (ZOFRAN-ODT) 4 MG TbDL Take 2 tablets (8 mg total) by mouth every 6 (six) hours as needed (nausea). 7/23/24   Maria Del Rosario Medina MD   pantoprazole (PROTONIX) 40 MG tablet Take 1 tablet (40 mg total) by mouth once daily. 10/5/24 10/5/25  Raisa Kearns MD   polyethylene glycol (GLYCOLAX) 17 gram PwPk Take 17 g by mouth once daily. 7/23/24   Maria Del Rosario Medina MD   senna (SENOKOT) 8.6 mg tablet Take 1 tablet by mouth 2 (two) times a day. 10/4/24   Raisa Kearns MD   senna-docusate 8.6-50 mg (PERICOLACE) 8.6-50 mg per tablet Take 1 tablet by mouth 2 (two) times daily. 7/23/24   Maria Del Rosario Medina MD   sucralfate (CARAFATE) 100 mg/mL suspension Take 10 mLs (1 g total) by mouth every 6 (six) hours. 10/4/24   Raisa Kearns MD   traMADoL (ULTRAM) 50 mg tablet Take 1 tablet (50 mg total) by mouth every 6 (six) hours as needed (Moderate pain 4-6/10). 10/4/24   Raisa Kearns MD   traZODone (DESYREL) 100 MG tablet Take 1 tablet (100 mg total) by mouth every evening. 10/4/24 10/4/25  Raisa Kearns MD   vitamin D (VITAMIN D3) 1000 units Tab Take 1 tablet (1,000 Units total) by mouth once daily. 10/4/24   Raisa Kearns MD   insulin aspart U-100 (NOVOLOG U-100 INSULIN ASPART) 100 unit/mL injection Use in insulin pump. Max daily dose 150 units. 9/15/20 9/18/20  Violetta Woods NP       Review of Systems:  Negative except for what is noted in HPI    Physical Exam:  VITAL SIGNS:   Vitals:    10/25/24 2100 10/25/24 2357 10/26/24 0357 10/26/24 0727   BP:  (!) 156/76 (!) 159/81 (!) 148/75   BP Location:  Right arm  "Right arm    Patient Position:  Lying Lying    Pulse: 98 100 101 102   Resp: 20 18 18 18   Temp: 98.4 °F (36.9 °C) 98.8 °F (37.1 °C) 99 °F (37.2 °C) 99 °F (37.2 °C)   TempSrc: Oral Oral Oral Oral   SpO2: 98% 97% 97% 97%   Weight:         TMAX: Temp (24hrs), Av.6 °F (37 °C), Min:98.2 °F (36.8 °C), Max:99 °F (37.2 °C)    General: Alert; No acute distress  Cardiovascular: Regular rate   Respiratory: Normal respiratory effort. Chest rise symmetric.   Abdomen: Soft, nontender, nondistended  Extremity: right bka with eschars near incision  Neurologic: No focal deficit. Speech normal   Back: left upper back wound from flap donor site      Diagnostic Data:  Recent Results (from the past 2 weeks)   CBC Auto Differential    Collection Time: 10/26/24  5:44 AM   Result Value Ref Range    WBC 10.65 3.90 - 12.70 K/uL    Hemoglobin 10.8 (L) 14.0 - 18.0 g/dL    Hematocrit 33.3 (L) 40.0 - 54.0 %    Platelets 375 150 - 450 K/uL   CBC W/ AUTO DIFFERENTIAL    Collection Time: 10/25/24  3:24 PM   Result Value Ref Range    WBC 12.40 3.90 - 12.70 K/uL    Hemoglobin 11.5 (L) 14.0 - 18.0 g/dL    Hematocrit 35.7 (L) 40.0 - 54.0 %    Platelets 408 150 - 450 K/uL     No results found for this or any previous visit (from the past 2 weeks).  Lab Results   Component Value Date    ALBUMIN 2.4 (L) 10/26/2024     Lab Results   Component Value Date    .6 (H) 09/15/2024     Lab Results   Component Value Date    INR 1.0 2024     No results found for: "PTT"    Microbiology Results (last 7 days)       ** No results found for the last 168 hours. **            Assessment:  64 y.o.male with left upper back wound from flap donor site    Plan:  Diabetic diet  Pain control  Local wound care  Glucose control  Medical management as per primary  Will plan for debridement in OR early this week      Dario Rankin DO  Plastic Surgery Fellow   "

## 2024-10-26 NOTE — ASSESSMENT & PLAN NOTE
Insulin Pump:  Patient has the ability and wherewithal to manage the pump.  Order has been placed to notify nurse of patient using own pump. (Order Set: Adult and Pediatric Home Insulin Pump)      Humalog via OmniPod insulin pump  Current pump settings:  Basal 12 am to 2.3 and 6 am to 1.55  ICR to 3 at 12 am, 2 at 4 pm  ISF to 1:20   AIT 3 hrs  Target 110-120        Pump type:    Change infusion set: Every 1-2 days (10/25/24)   Change insertion site: with change of infusion set. (10/25/24)   Location of insertion site: Abdomen  State of insertion site: appears clean

## 2024-10-26 NOTE — ASSESSMENT & PLAN NOTE
Pt with poor wound healing of right BKA site as well as left scapular region where free flap was previously extracted. He has already completed 2 week course oxacillin for MSSA. Now has started noticing more drainage especially from left scapular wound site. Seen by Plastics today, and sent to ED for admission to undergo debridement and wound vac placement    -Plastics consult placed for debridement of R BKA and Left scapular wound, planning for procedure early this week  -discuss whether abx coverage is necessary for draining wounds at this time   -Holding home AC for possible procedure  -Consult wound care for management once pt has debridement and wound vac placed

## 2024-10-26 NOTE — H&P
[facility] - Emergency Dept  [facility] Medicine  History & Physical    Patient Name: Cortes Schroeder  MRN: [id number]  Patient Class: OP- Observation  Admission Date: 10/25/2024  Attending Physician: Tonya Holland MD   Primary Care Provider: Andrew Sinclair Jr., MD         Patient information was obtained from patient and ER records.     Subjective:     Principal Problem:Surgical wound breakdown    Chief Complaint:   Chief Complaint   Patient presents with    Wound Check     States was sent from ortho plastics to check on skin graft from back, states needs wound vac placed. Also c/o constipation         HPI: Mr. Cortes Schroeder is a 64 y.o. male with previous medical history of insulin dependent type 2 diabetes mellitus with insulin pump, polyneuropathy due to type 2 diabetes mellitus, peripheral vascular disease, hyperlipidemia, And fall resulting in ORIF of right ankle in July of this year. Course parascapular free flap that did not take that ultimately resulted in getting BKA done 10/1. He was also found to have MSSA at that time. He was sent to Rehab after BKA procedure was done, with 2 week course IV oxacillin. Pt d/c from rehab on 10/20, and went to f/u w/ Plastic surgery today who noticed that he now has a non-healing wound to his back where his parascapular flap was extracted. He had also been complaining of some N/V and constipation. He was sent to the ED for admission, so he could undergo workup to r/o SBO as well as undergo debridement of his BKA and back wound with wound vac placement. At bedside, pt states that he has had 3-4 days of N/V and constipation without a BM, which resolved earlier today and he started having regular BM again. He states that he did notice some drainage from his back wound 3 days ago that was mostly clear, with some blood. However, he denied any other sx of fever, chills, chest pain, SOB, or dysuria.      In the ED, pt was afebrile HDS. CBC was done that showed WBC of  11, and CMP showed elevated glucose at 269. CT abd/pelvis was obtained to r/o SBO which was negative. Plastic Surgery team planning to see pt and do inpatient debridement procedure.    Past Medical History:   Diagnosis Date    Anxiety 12/18/2012    Back pain 12/18/2012    Cataract     Chronic pain syndrome 04/24/2016    Diabetes type 2, controlled 02/20/2016    Diabetic retinopathy     DM (diabetes mellitus) 12/18/2012    DM (diabetes mellitus), type 2, uncontrolled 11/16/2013    Gastroesophageal reflux disease without esophagitis 02/20/2016    Hyperlipidemia 12/18/2012    Insomnia 08/07/2014    Neuropathy 11/16/2013       Past Surgical History:   Procedure Laterality Date    ANGIOGRAPHY OF LOWER EXTREMITY Right 9/17/2024    Procedure: Angiogram Extremity Unilateral;  Surgeon: GINA Morton II, MD;  Location: [facility] OR [address] [place] [address];  Service: Vascular;  Laterality: Right;  Fluro time 19.5 min  mGy 260.49    ANKLE HARDWARE REMOVAL Right 9/23/2024    Procedure: REMOVAL, HARDWARE, ANKLE;  Surgeon: Moe Liz MD;  Location: [facility] OR [address] [place] [address];  Service: Orthopedics;  Laterality: Right;    APPLICATION OF WOUND VACUUM-ASSISTED CLOSURE DEVICE Right 9/6/2024    Procedure: APPLICATION, WOUND VAC;  Surgeon: Moe Liz MD;  Location: [facility] OR [address] [place] [address];  Service: Orthopedics;  Laterality: Right;    APPLICATION, EXTERNAL FIXATION DEVICE, FOR ANKLE FRACTURE Right 7/18/2024    Procedure: APPLICATION, EXTERNAL FIXATION DEVICE, FOR ANKLE FRACTURE;  Surgeon: Moe Liz MD;  Location: [facility] OR [address] [place] [address];  Service: Orthopedics;  Laterality: Right;    ARTHROTOMY OF ANKLE Right 9/9/2024    Procedure: ARTHROTOMY, ANKLE;  Surgeon: Moe Liz MD;  Location: [facility] OR [address] [place] [address];  Service: Orthopedics;  Laterality: Right;    BACK SURGERY  2003    Lumbar Spine    BELOW KNEE AMPUTATION OF LOWER EXTREMITY Right 10/1/2024    Procedure: AMPUTATION, BELOW KNEE - RIGHT;  Surgeon: Moe Liz MD;  Location: [facility] OR [address] [place] [address];   Service: Orthopedics;  Laterality: Right;  with wound vac placement    CATARACT EXTRACTION      CLOSED REDUCTION, FRACTURE, ANKLE, TRIMALLEOLAR Right 7/18/2024    Procedure: CLOSED REDUCTION, FRACTURE, ANKLE, TRIMALLEOLAR;  Surgeon: Moe Liz MD;  Location: [facility] OR [address] [place] [address];  Service: Orthopedics;  Laterality: Right;    EPIDURAL STEROID INJECTION N/A 1/16/2019    Procedure: Injection, Steroid, Epidural Cervical;  Surgeon: Andrew Sinclair Jr., MD;  Location: [facility] ENDO;  Service: Pain Management;  Laterality: N/A;  Cervical Epidural Steroid Injection C7-T1    49224    Arrive @ 1240    EPIDURAL STEROID INJECTION N/A 2/20/2019    Procedure: Injection, Steroid, Epidural;  Surgeon: Andrew Sinclair Jr., MD;  Location: [facility] ENDO;  Service: Pain Management;  Laterality: N/A;  Lumbar Epidural Steroid Injection L4-5    17302    Arrive @ 1215 (requests latest time)    FIXATION OF SYNDESMOSIS OF ANKLE Right 7/26/2024    Procedure: FIXATION, SYNDESMOSIS, ANKLE;  Surgeon: Moe Liz MD;  Location: [address];  Service: Orthopedics;  Laterality: Right;    FLAP PROCEDURE Right 9/26/2024    Procedure: CREATION, FREE FLAP;  Surgeon: Juanito Cueto DO;  Location: [address];  Service: Plastics;  Laterality: Right;  Spy; Def Free Flap; Micro instr., Microscope; 2 bovies, 2 teams    INJECTION, SPINE, LUMBOSACRAL, TRANSFORAMINAL APPROACH Bilateral 6/14/2024    Procedure: Bilateral L5 Transforaminal Epidural Steroid Injections;  Surgeon: Andrew Sinclair Jr., MD;  Location: [facility] PAIN [facility]NA[facility]EMENT;  Service: Pain Management;  Laterality: Bilateral;  @1300(given)  ASA last 6/8  Check BG  MD Sign.    IRRIGATION AND DEBRIDEMENT Right 9/6/2024    Procedure: IRRIGATION AND DEBRIDEMENT;  Surgeon: Moe Liz MD;  Location: [address];  Service: Orthopedics;  Laterality: Right;    IRRIGATION AND DEBRIDEMENT Right 9/20/2024    Procedure: IRRIGATION AND DEBRIDEMENT;  Surgeon: Moe Liz,  MD;  [address];  Service: Orthopedics;  Laterality: Right;    IRRIGATION AND DEBRIDEMENT OF LOWER EXTREMITY Right 9/9/2024    Procedure: IRRIGATION AND DEBRIDEMENT, LOWER EXTREMITY - WITH WOUND VAC CHANGE, RIGHT ANKLE;  Surgeon: Moe Liz MD;  [facility];  Service: Orthopedics;  Laterality: Right;  WITH WOUND VAC CHANGE, RIGHT ANKLE    OPEN REDUCTION AND INTERNAL FIXATION (ORIF) OF FRACTURE OF DISTAL RADIUS Left 7/19/2024    Procedure: ORIF, FRACTURE, RADIUS, DISTAL - LEFT;  Surgeon: Moe Liz MD;  Location: [facility] OR [address] [place] [address];  Service: Orthopedics;  Laterality: Left;  LEFT, SYNTHES, C ARM    OPEN REDUCTION AND INTERNAL FIXATION (ORIF) OF INJURY OF ANKLE Right 7/26/2024    Procedure: ORIF, ANKLE;  Surgeon: Moe Liz MD;  [facility];  Service: Orthopedics;  Laterality: Right;    REMOVAL OF EXTERNAL FIXATION DEVICE Right 7/26/2024    Procedure: REMOVAL, EXTERNAL FIXATION DEVICE;  Surgeon: Moe Liz MD;  Location: [facility] OR [address] [place] [address];  Service: Orthopedics;  Laterality: Right;    REPLACEMENT OF WOUND VACUUM-ASSISTED CLOSURE DEVICE Right 9/17/2024    Procedure: REPLACEMENT, WOUND VAC;  Surgeon: Moe Liz MD;  Location: [facility] OR [address] [place] [address];  Service: Orthopedics;  Laterality: Right;  wound vac dressing exchange    REPLACEMENT OF WOUND VACUUM-ASSISTED CLOSURE DEVICE Right 9/20/2024    Procedure: REPLACEMENT, WOUND VAC;  Surgeon: Moe Liz MD;  [address];  Service: Orthopedics;  Laterality: Right;    REPLACEMENT OF WOUND VACUUM-ASSISTED CLOSURE DEVICE Right 9/23/2024    Procedure: REPLACEMENT, WOUND VAC; white & black sponge;  Surgeon: Moe Liz MD;  Location: [facility] OR [address] [place] [address];  Service: Orthopedics;  Laterality: Right;       Review of patient's allergies indicates:   Allergen Reactions    Invokana [canagliflozin] Anaphylaxis    Percocet [oxycodone-acetaminophen] Nausea Only and Hallucinations    Biaxin  [clarithromycin]     Hydrocodone Other (See Comments)     Dizzy/nausea/hallucinations    Sulfa (sulfonamide antibiotics) Nausea Only and Rash       Current Facility-Administered Medications on File Prior to Encounter   Medication    [DISCONTINUED] GENERIC EXTERNAL MEDICATION    [DISCONTINUED] GENERIC EXTERNAL MEDICATION    [DISCONTINUED] GENERIC EXTERNAL MEDICATION     Current Outpatient Medications on File Prior to Encounter   Medication Sig    amLODIPine (NORVASC) 10 MG tablet Take 1 tablet (10 mg total) by mouth once daily.    apixaban (ELIQUIS) 2.5 mg Tab Take 1 tablet (2.5 mg total) by mouth 2 (two) times daily.    0.9% NaCl SolP 500 mL with oxacillin 1 gram SolR 12 g Inject 12 g into the vein once daily. End date 10/20/2024.    acetaminophen (TYLENOL) 500 MG tablet Take 2 tablets (1,000 mg total) by mouth every 8 (eight) hours.    aspirin (ECOTRIN) 81 MG EC tablet Take 1 tablet (81 mg total) by mouth once daily.    atorvastatin (LIPITOR) 40 MG tablet Take 40 mg by mouth once daily.    blood-glucose sensor (DEXCOM G6 SENSOR) [place] Change sensor every 10 days    blood-glucose transmitter (DEXCOM G6 TRANSMITTER) O[place]ira Change transmitter every 3 months    calcium carbonate (TUMS) 200 mg calcium (500 mg) chewable tablet Take 2 tablets (1,000 mg total) by mouth 3 (three) times daily as needed for Heartburn.    celecoxib (CELEBREX) 200 MG capsule Take 1 capsule (200 mg total) by mouth once daily.    cyanocobalamin (VITAMIN B-12) 1000 MCG tablet Take 1,000 mcg by mouth once daily.    DULoxetine (CYMBALTA) 60 MG capsule Take 1 capsule (60 mg total) by mouth once daily.    emtricitabine-tenofovir 200-300 mg (TRUVADA) 200-300 mg Tab Take 1 tablet by mouth once daily.    fish oil-omega-3 fatty acids 300-1,000 mg capsule Take 1 capsule by mouth 2 (two) times daily.    furosemide (LASIX) 40 MG tablet Take 1 tablet (40 mg total) by mouth once daily.    gabapentin (NEURONTIN) 300 MG capsule Take 1 capsule (300 mg total) by  mouth 2 (two) times daily.    HUMALOG U-100 INSULIN 100 unit/mL injection 1:20 U PRN high blood sugar and snacks. Correction dose- Enter carb coverage intake upon administration  Target number 120 Carbohydrate coverage #1 1:2 Carbohydrate coverage #1 time [id number] Carbohydrate coverage #2 1:3 Carbohydrate coverage #2 time [id number] Carbohydrate coverage #3 Carbohydrate coverage #3 time Carbohydrate coverage #4 Carbohydrate coverage #4 time Sensitivity #1 1:20 Sensitivity #1 time 2264-6836 Sensitivity #2 Sensitivity #2 time Sensitivity #3 Sensitivity #3 time Sensitivity #4 Sensitivity #4 time Sensitivity #5 Sensitivity #5 time Order Questions Question Answer Comment       50 U SQ continuous  Target number 120 Basal Rate #1 2.3 Basal rate #1 time 9766-7730 Basal Rate #2 1.55 Basal rate #2 time 1060-7587 Basal rate #3 Basal rate #3 time Basal rate #4 Basal rate #4 time Basal rate #5 Basal rate #5 time    hydrALAZINE (APRESOLINE) 50 MG tablet Take 1 tablet (50 mg total) by mouth every 8 (eight) hours.    losartan (COZAAR) 25 MG tablet Take 2 tablets (50 mg total) by mouth once daily.    magnesium oxide (MAG-OX) 400 mg (241.3 mg magnesium) tablet Take 0.5 tablets (200 mg total) by mouth once daily.    melatonin (MELATIN) 3 mg tablet Take 2 tablets (6 mg total) by mouth nightly as needed for Insomnia.    methocarbamoL (ROBAXIN) 500 MG Tab Take 1 tablet (500 mg total) by mouth every 8 (eight) hours.    morphine (MSIR) 15 MG tablet Take 1 tablet (15 mg total) by mouth every 4 (four) hours as needed (Severe pain 7-10/10).    MOUNJARO 10 mg/0.5 mL PnIj Inject 10 mg into the skin once a week. HOLD until all surgeries completed and out of rehab    nortriptyline (PAMELOR) 50 MG capsule Take 1 capsule (50 mg total) by mouth nightly.    ondansetron (ZOFRAN-ODT) 4 MG TbDL Take 2 tablets (8 mg total) by mouth every 6 (six) hours as needed (nausea).    pantoprazole (PROTONIX) 40 MG tablet Take 1 tablet (40 mg total) by mouth once  daily.    polyethylene glycol (GLYCOLAX) 17 gram PwPk Take 17 g by mouth once daily.    senna (SENOKOT) 8.6 mg tablet Take 1 tablet by mouth 2 (two) times a day.    senna-docusate 8.6-50 mg (PERICOLACE) 8.6-50 mg per tablet Take 1 tablet by mouth 2 (two) times daily.    sucralfate (CARAFATE) 100 mg/mL suspension Take 10 mLs (1 g total) by mouth every 6 (six) hours.    traMADoL (ULTRAM) 50 mg tablet Take 1 tablet (50 mg total) by mouth every 6 (six) hours as needed (Moderate pain 4-6/10).    traZODone (DESYREL) 100 MG tablet Take 1 tablet (100 mg total) by mouth every evening.    vitamin D (VITAMIN D3) 1000 units Tab Take 1 tablet (1,000 Units total) by mouth once daily.    [DISCONTINUED] insulin aspart U-100 (NOVOLOG U-100 INSULIN ASPART) 100 unit/mL injection Use in insulin pump. Max daily dose 150 units.     Family History       Problem Relation (Age of Onset)    Alzheimer's disease Father    Cataracts Father    Heart attack Mother    Hypertension Father, Mother    Migraines Mother    Parkinsonism Father          Tobacco Use    Smoking status: Never    Smokeless tobacco: Never   Substance and Sexual Activity    Alcohol use: Yes     Alcohol/week: 1.0 standard drink of alcohol     Types: 1 Glasses of wine per week     Comment: occasionally    Drug use: No    Sexual activity: Not Currently     Partners: Male     Birth control/protection: Condom     Comment: 10/2/17      Review of Systems   Constitutional:  Negative for chills and fever.   Respiratory:  Negative for cough, shortness of breath and wheezing.    Cardiovascular:  Negative for chest pain and palpitations.   Gastrointestinal:  Positive for constipation, nausea and vomiting. Negative for abdominal distention and abdominal pain.   Genitourinary:  Negative for difficulty urinating and dysuria.   Neurological:  Negative for dizziness and light-headedness.     Objective:     Vital Signs (Most Recent):  Temp: 98.3 °F (36.8 °C) (10/25/24 1505)  Pulse: 101  (10/25/24 1815)  Resp: 18 (10/25/24 1815)  BP: (!) 154/74 (10/25/24 1815)  SpO2: 100 % (10/25/24 1815) Vital Signs (24h Range):  Temp:  [98.2 °F (36.8 °C)-98.3 °F (36.8 °C)] 98.3 °F (36.8 °C)  Pulse:  [] 101  Resp:  [13-18] 18  SpO2:  [99 %-100 %] 100 %  BP: (141-174)/(67-82) 154/74     Weight: 92.5 kg (204 lb)  Body mass index is 33.95 kg/m².     Physical Exam  HENT:      Head: Atraumatic.   Eyes:      Extraocular Movements: Extraocular movements intact.      Pupils: Pupils are equal, round, and reactive to light.   Cardiovascular:      Rate and Rhythm: Normal rate and regular rhythm.      Pulses: Normal pulses.      Heart sounds: Normal heart sounds.   Pulmonary:      Effort: Pulmonary effort is normal.      Breath sounds: Normal breath sounds.   Abdominal:      General: Bowel sounds are normal.      Palpations: Abdomen is soft.   Musculoskeletal:      Comments: -poorly healed wound in place to left scapular area at previous flap site. Staples still in place, and minimal clear yellow drainage present  -R BKA site w/ poor healing wound   Neurological:      Mental Status: He is alert and oriented to person, place, and time.              CRANIAL NERVES     CN III, IV, VI   Pupils are equal, round, and reactive to light.       Significant Labs: All pertinent labs within the past 24 hours have been reviewed.  CBC:   Recent Labs   Lab 10/25/24  1524 10/25/24  1531   WBC 12.40  --    HGB 11.5*  --    HCT 35.7* 37     --      CMP:   Recent Labs   Lab 10/25/24  1524   *   K 4.7   CL 96   CO2 26   *   BUN 20   CREATININE 1.0   CALCIUM 9.6   PROT 7.6   ALBUMIN 2.7*   BILITOT 0.2   ALKPHOS 135   AST 16   ALT 10   ANIONGAP 11       Significant Imaging:  CT Abd/Pelvis negative for signs of acute bowel obstruction  Assessment/Plan:     Surgical wound breakdown  Pt with poor wound healing of right BKA site as well as left scapular region where free flap was previously extracted. He has already completed  2 week course oxacillin for MSSA. Now has started noticing more drainage especially from left scapular wound site. Seen by Plastics today, and sent to ED for admission to undergo debridement and wound vac placement    -Plastics consult placed for debridement of R BKA and Left scapular wound  -Holding home AC for possible procedure  -Consult wound care for management once pt has debridement and wound vac placed      Hypertension  On Losartan, Lasix, hydralazine at home and HTN well controlled. Pt recently prescribed amlodipine but has not picked it up yet    -restart Amlodipine, Lasix, Hydral while inpatient.   -Hold losartan at this time and restart if additional BP meds needed     Insulin pump status  Pt with DM2 with long term use of insulin pump. Pt has insulin pump and CGM with him.     -Consult to endocrine placed for inpatient mgmt, and recs on whether home insulin pump should be used  -POCT glucose checks ACHS  -SSI w/ ACHS checks      Gastroesophageal reflux disease without esophagitis  Continue home PPI inpatient      Neuropathy  Controlled with gabapentin at home, continue inpatient      Anxiety  -on Duloxetine at home, restart inpatient        VTE Risk Mitigation (From admission, onward)           Ordered     heparin (porcine) injection 5,000 Units  Every 8 hours         10/25/24 1753     IP VTE HIGH RISK PATIENT  Once         10/25/24 1753     Place sequential compression device  Until discontinued         10/25/24 1753                                  Duane Ospina MD  Department of Hospital Medicine  [facility] - Emergency Dept

## 2024-10-26 NOTE — SUBJECTIVE & OBJECTIVE
Interval History: NAEO. Pt feeling well this morning and has had no further N/V or any new sx. Awaiting decision from Plastic Surgery team about when he will go for debridement.    Review of Systems  Objective:     Vital Signs (Most Recent):  Temp: 99 °F (37.2 °C) (10/26/24 0727)  Pulse: 102 (10/26/24 0727)  Resp: 18 (10/26/24 0727)  BP: (!) 148/75 (10/26/24 0727)  SpO2: 97 % (10/26/24 0727) Vital Signs (24h Range):  Temp:  [98.2 °F (36.8 °C)-99 °F (37.2 °C)] 99 °F (37.2 °C)  Pulse:  [] 102  Resp:  [13-20] 18  SpO2:  [97 %-100 %] 97 %  BP: (141-174)/(65-82) 148/75     Weight: 92.5 kg (204 lb)  Body mass index is 33.95 kg/m².    Intake/Output Summary (Last 24 hours) at 10/26/2024 0855  Last data filed at 10/26/2024 0554  Gross per 24 hour   Intake --   Output 400 ml   Net -400 ml         Physical Exam  HENT:      Head: Atraumatic.   Eyes:      Extraocular Movements: Extraocular movements intact.      Pupils: Pupils are equal, round, and reactive to light.   Cardiovascular:      Rate and Rhythm: Normal rate and regular rhythm.      Pulses: Normal pulses.      Heart sounds: Normal heart sounds.   Pulmonary:      Effort: Pulmonary effort is normal.      Breath sounds: Normal breath sounds.   Abdominal:      General: Bowel sounds are normal.      Palpations: Abdomen is soft.   Musculoskeletal:      Comments: -poorly healed wound in place to left scapular area at previous flap site. Staples still in place, and minimal clear yellow drainage present  -R BKA site w/ poor healing wound   Neurological:      Mental Status: He is alert and oriented to person, place, and time.             Significant Labs: All pertinent labs within the past 24 hours have been reviewed.  CBC:   Recent Labs   Lab 10/25/24  1524 10/25/24  1531 10/26/24  0544   WBC 12.40  --  10.65   HGB 11.5*  --  10.8*   HCT 35.7* 37 33.3*     --  375     CMP:   Recent Labs   Lab 10/25/24  1524 10/26/24  0544   * 138   K 4.7 3.8   CL 96 101   CO2  26 28   * 112*   BUN 20 14   CREATININE 1.0 0.8   CALCIUM 9.6 9.2   PROT 7.6 6.8   ALBUMIN 2.7* 2.4*   BILITOT 0.2 0.3   ALKPHOS 135 116   AST 16 12   ALT 10 9*   ANIONGAP 11 9       Significant Imaging: I have reviewed all pertinent imaging results/findings within the past 24 hours.

## 2024-10-27 ENCOUNTER — ANESTHESIA (OUTPATIENT)
Dept: SURGERY | Facility: HOSPITAL | Age: 64
DRG: 902 | End: 2024-10-27
Payer: COMMERCIAL

## 2024-10-27 LAB
ALBUMIN SERPL BCP-MCNC: 2.2 G/DL (ref 3.5–5.2)
ALP SERPL-CCNC: 108 U/L (ref 40–150)
ALT SERPL W/O P-5'-P-CCNC: 9 U/L (ref 10–44)
ANION GAP SERPL CALC-SCNC: 10 MMOL/L (ref 8–16)
AST SERPL-CCNC: 14 U/L (ref 10–40)
BASOPHILS # BLD AUTO: 0.09 K/UL (ref 0–0.2)
BASOPHILS NFR BLD: 0.7 % (ref 0–1.9)
BILIRUB SERPL-MCNC: 0.3 MG/DL (ref 0.1–1)
BUN SERPL-MCNC: 16 MG/DL (ref 8–23)
CALCIUM SERPL-MCNC: 9 MG/DL (ref 8.7–10.5)
CHLORIDE SERPL-SCNC: 101 MMOL/L (ref 95–110)
CO2 SERPL-SCNC: 23 MMOL/L (ref 23–29)
CREAT SERPL-MCNC: 0.9 MG/DL (ref 0.5–1.4)
DIFFERENTIAL METHOD BLD: ABNORMAL
EOSINOPHIL # BLD AUTO: 0.1 K/UL (ref 0–0.5)
EOSINOPHIL NFR BLD: 0.4 % (ref 0–8)
ERYTHROCYTE [DISTWIDTH] IN BLOOD BY AUTOMATED COUNT: 14.8 % (ref 11.5–14.5)
EST. GFR  (NO RACE VARIABLE): >60 ML/MIN/1.73 M^2
GLUCOSE SERPL-MCNC: 107 MG/DL (ref 70–110)
HCT VFR BLD AUTO: 30.6 % (ref 40–54)
HGB BLD-MCNC: 10 G/DL (ref 14–18)
IMM GRANULOCYTES # BLD AUTO: 0.05 K/UL (ref 0–0.04)
IMM GRANULOCYTES NFR BLD AUTO: 0.4 % (ref 0–0.5)
LYMPHOCYTES # BLD AUTO: 1.8 K/UL (ref 1–4.8)
LYMPHOCYTES NFR BLD: 14.2 % (ref 18–48)
MAGNESIUM SERPL-MCNC: 2.1 MG/DL (ref 1.6–2.6)
MCH RBC QN AUTO: 28.9 PG (ref 27–31)
MCHC RBC AUTO-ENTMCNC: 32.7 G/DL (ref 32–36)
MCV RBC AUTO: 88 FL (ref 82–98)
MONOCYTES # BLD AUTO: 1.2 K/UL (ref 0.3–1)
MONOCYTES NFR BLD: 9.1 % (ref 4–15)
NEUTROPHILS # BLD AUTO: 9.6 K/UL (ref 1.8–7.7)
NEUTROPHILS NFR BLD: 75.2 % (ref 38–73)
NRBC BLD-RTO: 0 /100 WBC
PHOSPHATE SERPL-MCNC: 3.3 MG/DL (ref 2.7–4.5)
PLATELET # BLD AUTO: 345 K/UL (ref 150–450)
PMV BLD AUTO: 10.3 FL (ref 9.2–12.9)
POCT GLUCOSE: 143 MG/DL (ref 70–110)
POCT GLUCOSE: 252 MG/DL (ref 70–110)
POCT GLUCOSE: 276 MG/DL (ref 70–110)
POCT GLUCOSE: 299 MG/DL (ref 70–110)
POCT GLUCOSE: 324 MG/DL (ref 70–110)
POTASSIUM SERPL-SCNC: 4.1 MMOL/L (ref 3.5–5.1)
PROT SERPL-MCNC: 6.4 G/DL (ref 6–8.4)
RBC # BLD AUTO: 3.46 M/UL (ref 4.6–6.2)
SODIUM SERPL-SCNC: 134 MMOL/L (ref 136–145)
WBC # BLD AUTO: 12.76 K/UL (ref 3.9–12.7)

## 2024-10-27 PROCEDURE — 37000009 HC ANESTHESIA EA ADD 15 MINS: Performed by: SURGERY

## 2024-10-27 PROCEDURE — 71000033 HC RECOVERY, INTIAL HOUR: Performed by: SURGERY

## 2024-10-27 PROCEDURE — 97535 SELF CARE MNGMENT TRAINING: CPT

## 2024-10-27 PROCEDURE — 63600175 PHARM REV CODE 636 W HCPCS: Performed by: NURSE ANESTHETIST, CERTIFIED REGISTERED

## 2024-10-27 PROCEDURE — 37000008 HC ANESTHESIA 1ST 15 MINUTES: Performed by: SURGERY

## 2024-10-27 PROCEDURE — 0HB6XZZ EXCISION OF BACK SKIN, EXTERNAL APPROACH: ICD-10-PCS | Performed by: SURGERY

## 2024-10-27 PROCEDURE — 63600175 PHARM REV CODE 636 W HCPCS: Performed by: NURSE PRACTITIONER

## 2024-10-27 PROCEDURE — 97165 OT EVAL LOW COMPLEX 30 MIN: CPT

## 2024-10-27 PROCEDURE — 25000003 PHARM REV CODE 250: Performed by: NURSE ANESTHETIST, CERTIFIED REGISTERED

## 2024-10-27 PROCEDURE — 25000003 PHARM REV CODE 250: Performed by: STUDENT IN AN ORGANIZED HEALTH CARE EDUCATION/TRAINING PROGRAM

## 2024-10-27 PROCEDURE — 63600175 PHARM REV CODE 636 W HCPCS

## 2024-10-27 PROCEDURE — 11000001 HC ACUTE MED/SURG PRIVATE ROOM

## 2024-10-27 PROCEDURE — 80053 COMPREHEN METABOLIC PANEL: CPT

## 2024-10-27 PROCEDURE — 84100 ASSAY OF PHOSPHORUS: CPT

## 2024-10-27 PROCEDURE — 11042 DBRDMT SUBQ TIS 1ST 20SQCM/<: CPT | Mod: ,,, | Performed by: SURGERY

## 2024-10-27 PROCEDURE — 36415 COLL VENOUS BLD VENIPUNCTURE: CPT

## 2024-10-27 PROCEDURE — 27201423 OPTIME MED/SURG SUP & DEVICES STERILE SUPPLY: Performed by: SURGERY

## 2024-10-27 PROCEDURE — 71000015 HC POSTOP RECOV 1ST HR: Performed by: SURGERY

## 2024-10-27 PROCEDURE — 82962 GLUCOSE BLOOD TEST: CPT | Performed by: SURGERY

## 2024-10-27 PROCEDURE — 25000003 PHARM REV CODE 250: Performed by: ANESTHESIOLOGY

## 2024-10-27 PROCEDURE — 36000707: Performed by: SURGERY

## 2024-10-27 PROCEDURE — 94761 N-INVAS EAR/PLS OXIMETRY MLT: CPT

## 2024-10-27 PROCEDURE — 97605 NEG PRS WND THER DME<=50SQCM: CPT | Mod: ,,, | Performed by: SURGERY

## 2024-10-27 PROCEDURE — 63600175 PHARM REV CODE 636 W HCPCS: Performed by: STUDENT IN AN ORGANIZED HEALTH CARE EDUCATION/TRAINING PROGRAM

## 2024-10-27 PROCEDURE — 85025 COMPLETE CBC W/AUTO DIFF WBC: CPT

## 2024-10-27 PROCEDURE — 11045 DBRDMT SUBQ TISS EACH ADDL: CPT | Mod: ,,, | Performed by: SURGERY

## 2024-10-27 PROCEDURE — 97530 THERAPEUTIC ACTIVITIES: CPT

## 2024-10-27 PROCEDURE — 36000706: Performed by: SURGERY

## 2024-10-27 PROCEDURE — 0JB70ZZ EXCISION OF BACK SUBCUTANEOUS TISSUE AND FASCIA, OPEN APPROACH: ICD-10-PCS | Performed by: SURGERY

## 2024-10-27 PROCEDURE — 83735 ASSAY OF MAGNESIUM: CPT

## 2024-10-27 PROCEDURE — 99232 SBSQ HOSP IP/OBS MODERATE 35: CPT | Mod: ,,, | Performed by: NURSE PRACTITIONER

## 2024-10-27 PROCEDURE — 25000003 PHARM REV CODE 250

## 2024-10-27 RX ORDER — OXYCODONE HYDROCHLORIDE 5 MG/1
5 TABLET ORAL
Status: DISCONTINUED | OUTPATIENT
Start: 2024-10-27 | End: 2024-10-29

## 2024-10-27 RX ORDER — IBUPROFEN 200 MG
16 TABLET ORAL
Status: DISCONTINUED | OUTPATIENT
Start: 2024-10-27 | End: 2024-10-28

## 2024-10-27 RX ORDER — HYDROMORPHONE HYDROCHLORIDE 1 MG/ML
0.2 INJECTION, SOLUTION INTRAMUSCULAR; INTRAVENOUS; SUBCUTANEOUS EVERY 5 MIN PRN
Status: DISCONTINUED | OUTPATIENT
Start: 2024-10-27 | End: 2024-10-27

## 2024-10-27 RX ORDER — FENTANYL CITRATE 50 UG/ML
INJECTION, SOLUTION INTRAMUSCULAR; INTRAVENOUS
Status: DISCONTINUED | OUTPATIENT
Start: 2024-10-27 | End: 2024-10-27

## 2024-10-27 RX ORDER — PROCHLORPERAZINE EDISYLATE 5 MG/ML
5 INJECTION INTRAMUSCULAR; INTRAVENOUS EVERY 30 MIN PRN
Status: DISCONTINUED | OUTPATIENT
Start: 2024-10-27 | End: 2024-10-29 | Stop reason: HOSPADM

## 2024-10-27 RX ORDER — ROCURONIUM BROMIDE 10 MG/ML
INJECTION, SOLUTION INTRAVENOUS
Status: DISCONTINUED | OUTPATIENT
Start: 2024-10-27 | End: 2024-10-27

## 2024-10-27 RX ORDER — PROPOFOL 10 MG/ML
VIAL (ML) INTRAVENOUS
Status: DISCONTINUED | OUTPATIENT
Start: 2024-10-27 | End: 2024-10-27

## 2024-10-27 RX ORDER — HALOPERIDOL 5 MG/ML
0.5 INJECTION INTRAMUSCULAR EVERY 10 MIN PRN
Status: DISCONTINUED | OUTPATIENT
Start: 2024-10-27 | End: 2024-10-27

## 2024-10-27 RX ORDER — LIDOCAINE HYDROCHLORIDE 20 MG/ML
INJECTION, SOLUTION EPIDURAL; INFILTRATION; INTRACAUDAL; PERINEURAL
Status: DISCONTINUED | OUTPATIENT
Start: 2024-10-27 | End: 2024-10-27

## 2024-10-27 RX ORDER — ONDANSETRON HYDROCHLORIDE 2 MG/ML
INJECTION, SOLUTION INTRAVENOUS
Status: DISCONTINUED | OUTPATIENT
Start: 2024-10-27 | End: 2024-10-27

## 2024-10-27 RX ORDER — IBUPROFEN 200 MG
24 TABLET ORAL
Status: DISCONTINUED | OUTPATIENT
Start: 2024-10-27 | End: 2024-10-28

## 2024-10-27 RX ORDER — GLUCAGON 1 MG
1 KIT INJECTION
Status: DISCONTINUED | OUTPATIENT
Start: 2024-10-27 | End: 2024-10-29

## 2024-10-27 RX ORDER — CEFAZOLIN 2 G/1
INJECTION, POWDER, FOR SOLUTION INTRAMUSCULAR; INTRAVENOUS
Status: DISCONTINUED | OUTPATIENT
Start: 2024-10-27 | End: 2024-10-27

## 2024-10-27 RX ORDER — FENTANYL CITRATE 50 UG/ML
25 INJECTION, SOLUTION INTRAMUSCULAR; INTRAVENOUS EVERY 5 MIN PRN
Status: DISCONTINUED | OUTPATIENT
Start: 2024-10-27 | End: 2024-10-27

## 2024-10-27 RX ORDER — INSULIN ASPART 100 [IU]/ML
0-10 INJECTION, SOLUTION INTRAVENOUS; SUBCUTANEOUS
Status: DISCONTINUED | OUTPATIENT
Start: 2024-10-27 | End: 2024-10-28

## 2024-10-27 RX ORDER — INSULIN GLARGINE 100 [IU]/ML
16 INJECTION, SOLUTION SUBCUTANEOUS DAILY
Status: DISCONTINUED | OUTPATIENT
Start: 2024-10-27 | End: 2024-10-28

## 2024-10-27 RX ORDER — GLUCAGON 1 MG
1 KIT INJECTION
Status: DISCONTINUED | OUTPATIENT
Start: 2024-10-27 | End: 2024-10-28

## 2024-10-27 RX ORDER — EPHEDRINE SULFATE 50 MG/ML
INJECTION, SOLUTION INTRAVENOUS
Status: DISCONTINUED | OUTPATIENT
Start: 2024-10-27 | End: 2024-10-27

## 2024-10-27 RX ADMIN — ALUMINUM HYDROXIDE, MAGNESIUM HYDROXIDE, AND SIMETHICONE 30 ML: 200; 200; 20 SUSPENSION ORAL at 05:10

## 2024-10-27 RX ADMIN — HYDRALAZINE HYDROCHLORIDE 50 MG: 25 TABLET ORAL at 10:10

## 2024-10-27 RX ADMIN — INSULIN GLARGINE 16 UNITS: 100 INJECTION, SOLUTION SUBCUTANEOUS at 12:10

## 2024-10-27 RX ADMIN — GABAPENTIN 300 MG: 300 CAPSULE ORAL at 08:10

## 2024-10-27 RX ADMIN — OXYCODONE 5 MG: 5 TABLET ORAL at 08:10

## 2024-10-27 RX ADMIN — ROCURONIUM BROMIDE 50 MG: 10 INJECTION INTRAVENOUS at 03:10

## 2024-10-27 RX ADMIN — FENTANYL CITRATE 25 MCG: 50 INJECTION INTRAMUSCULAR; INTRAVENOUS at 03:10

## 2024-10-27 RX ADMIN — SODIUM CHLORIDE, SODIUM LACTATE, POTASSIUM CHLORIDE, AND CALCIUM CHLORIDE: 600; 310; 30; 20 INJECTION, SOLUTION INTRAVENOUS at 12:10

## 2024-10-27 RX ADMIN — DOXYCYCLINE HYCLATE 100 MG: 100 TABLET, COATED ORAL at 08:10

## 2024-10-27 RX ADMIN — CEFAZOLIN 2 G: 330 INJECTION, POWDER, FOR SOLUTION INTRAMUSCULAR; INTRAVENOUS at 03:10

## 2024-10-27 RX ADMIN — ONDANSETRON 4 MG: 2 INJECTION INTRAMUSCULAR; INTRAVENOUS at 03:10

## 2024-10-27 RX ADMIN — TRAZODONE HYDROCHLORIDE 50 MG: 50 TABLET ORAL at 08:10

## 2024-10-27 RX ADMIN — SUGAMMADEX 200 MG: 100 INJECTION, SOLUTION INTRAVENOUS at 04:10

## 2024-10-27 RX ADMIN — DULOXETINE HYDROCHLORIDE 60 MG: 60 CAPSULE, DELAYED RELEASE ORAL at 08:10

## 2024-10-27 RX ADMIN — PANTOPRAZOLE SODIUM 40 MG: 40 TABLET, DELAYED RELEASE ORAL at 08:10

## 2024-10-27 RX ADMIN — INSULIN ASPART 6 UNITS: 100 INJECTION, SOLUTION INTRAVENOUS; SUBCUTANEOUS at 12:10

## 2024-10-27 RX ADMIN — HYDRALAZINE HYDROCHLORIDE 50 MG: 25 TABLET ORAL at 05:10

## 2024-10-27 RX ADMIN — EPHEDRINE SULFATE 15 MG: 50 INJECTION INTRAVENOUS at 04:10

## 2024-10-27 RX ADMIN — INSULIN ASPART 4 UNITS: 100 INJECTION, SOLUTION INTRAVENOUS; SUBCUTANEOUS at 08:10

## 2024-10-27 RX ADMIN — EPHEDRINE SULFATE 10 MG: 50 INJECTION INTRAVENOUS at 03:10

## 2024-10-27 RX ADMIN — INSULIN ASPART 6 UNITS: 100 INJECTION, SOLUTION INTRAVENOUS; SUBCUTANEOUS at 05:10

## 2024-10-27 RX ADMIN — LIDOCAINE HYDROCHLORIDE 100 MG: 20 INJECTION, SOLUTION EPIDURAL; INFILTRATION; INTRACAUDAL at 03:10

## 2024-10-27 RX ADMIN — SODIUM CHLORIDE: 0.9 INJECTION, SOLUTION INTRAVENOUS at 03:10

## 2024-10-27 RX ADMIN — PROPOFOL 150 MG: 10 INJECTION, EMULSION INTRAVENOUS at 03:10

## 2024-10-27 RX ADMIN — ALUMINUM HYDROXIDE, MAGNESIUM HYDROXIDE, AND SIMETHICONE 30 ML: 200; 200; 20 SUSPENSION ORAL at 09:10

## 2024-10-27 RX ADMIN — HEPARIN SODIUM 5000 UNITS: 5000 INJECTION INTRAVENOUS; SUBCUTANEOUS at 10:10

## 2024-10-27 NOTE — PROGRESS NOTES
[facility] - Internal Medicine [facility] Medicine  Progress Note    Patient Name: Cortes Schroeder  MRN: [id number]  Patient Class: IP- Inpatient   Admission Date: 10/25/2024  Length of Stay: 1 days  Attending Physician: Tonya Holland MD  Primary Care Provider: Andrew Sinclair Jr., MD        Subjective:     Principal Problem:Surgical wound breakdown        HPI:  Mr. Cortes Schroeder is a 64 y.o. male with previous medical history of insulin dependent type 2 diabetes mellitus with insulin pump, polyneuropathy due to type 2 diabetes mellitus, peripheral vascular disease, hyperlipidemia, And fall resulting in ORIF of right ankle in July of this year. Course parascapular free flap that did not take that ultimately resulted in getting BKA done 10/1. He was also found to have MSSA at that time. He was sent to Rehab after BKA procedure was done, with 2 week course IV oxacillin. Pt d/c from rehab on 10/20, and went to f/u w/ Plastic surgery today who noticed that he now has a non-healing wound to his back where his parascapular flap was extracted. He had also been complaining of some N/V and constipation. He was sent to the ED for admission, so he could undergo workup to r/o SBO as well as undergo debridement of his BKA and back wound with wound vac placement. At bedside, pt states that he has had 3-4 days of N/V and constipation without a BM, which resolved earlier today and he started having regular BM again. He states that he did notice some drainage from his back wound 3 days ago that was mostly clear, with some blood. However, he denied any other sx of fever, chills, chest pain, SOB, or dysuria.      In the ED, pt was afebrile HDS. CBC was done that showed WBC of 11, and CMP showed elevated glucose at 269. CT abd/pelvis was obtained to r/o SBO which was negative. Plastic Surgery team planning to see pt and do inpatient debridement procedure.    Overview/Hospital Course:  Mr. Cortes Schroeder is a 64 y.o.  male with previous medical history of insulin dependent type 2 diabetes mellitus with insulin pump, polyneuropathy due to type 2 diabetes mellitus, peripheral vascular disease, hyperlipidemia, And fall resulting in ORIF of right ankle in July of this year. Course parascapular free flap that did not take that ultimately resulted in getting BKA done 10/1. He was also found to have MSSA at that time. He was sent to Rehab after BKA procedure was done, with 2 week course IV oxacillin. Pt d/c from rehab on 10/20, and went to f/u w/ Plastic surgery today who noticed that he now has a non-healing wound to his back where his parascapular flap was extracted. He had also been complaining of some N/V and constipation. He was sent to the ED for admission, so he could undergo workup to r/o SBO as well as undergo debridement of his BKA and back wound with wound vac placement. On ED admission, CT abd/pelvis ruled out bowel obstruction and pt N/V resolved. He was admitted w/ consult placed to Plastic Surgery for wound debridement, as well as consult to Endocrinology for insulin pump and glucose management. Per plastic surgery team, added on Doxy for possible infected wound to get skin coverage. Pt scheduled for debridement 10/27 w/ plastic surgery.    Interval History: NAEO pt stable this morning and feels well. NPO since midnight planned for surgery today.    Review of Systems  Objective:     Vital Signs (Most Recent):  Temp: 98.5 °F (36.9 °C) (10/27/24 0735)  Pulse: 99 (10/27/24 0735)  Resp: 18 (10/27/24 0735)  BP: 119/64 (10/27/24 0735)  SpO2: 96 % (10/27/24 0735) Vital Signs (24h Range):  Temp:  [98.5 °F (36.9 °C)-99.6 °F (37.6 °C)] 98.5 °F (36.9 °C)  Pulse:  [] 99  Resp:  [18] 18  SpO2:  [95 %-99 %] 96 %  BP: (102-130)/(62-74) 119/64     Weight: 92.5 kg (204 lb)  Body mass index is 33.95 kg/m².    Intake/Output Summary (Last 24 hours) at 10/27/2024 0835  Last data filed at 10/27/2024 0829  Gross per 24 hour   Intake --    Output 800 ml   Net -800 ml         Physical Exam  HENT:      Head: Atraumatic.   Eyes:      Extraocular Movements: Extraocular movements intact.      Pupils: Pupils are equal, round, and reactive to light.   Cardiovascular:      Rate and Rhythm: Normal rate and regular rhythm.      Pulses: Normal pulses.      Heart sounds: Normal heart sounds.   Pulmonary:      Effort: Pulmonary effort is normal.      Breath sounds: Normal breath sounds.   Abdominal:      General: Bowel sounds are normal.      Palpations: Abdomen is soft.   Musculoskeletal:      Comments: -poorly healed wound in place to left scapular area at previous flap site. Staples still in place, and minimal clear yellow drainage present  -R BKA site w/ poor healing wound   Neurological:      Mental Status: He is alert and oriented to person, place, and time.             Significant Labs: All pertinent labs within the past 24 hours have been reviewed.  CBC:   Recent Labs   Lab 10/25/24  1524 10/25/24  1531 10/26/24  0544 10/27/24  0543   WBC 12.40  --  10.65 12.76*   HGB 11.5*  --  10.8* 10.0*   HCT 35.7* 37 33.3* 30.6*     --  375 345     CMP:   Recent Labs   Lab 10/25/24  1524 10/26/24  0544 10/27/24  0543   * 138 134*   K 4.7 3.8 4.1   CL 96 101 101   CO2 26 28 23   * 112* 107   BUN 20 14 16   CREATININE 1.0 0.8 0.9   CALCIUM 9.6 9.2 9.0   PROT 7.6 6.8 6.4   ALBUMIN 2.7* 2.4* 2.2*   BILITOT 0.2 0.3 0.3   ALKPHOS 135 116 108   AST 16 12 14   ALT 10 9* 9*   ANIONGAP 11 9 10       Significant Imaging: I have reviewed all pertinent imaging results/findings within the past 24 hours.    Assessment/Plan:      * Surgical wound breakdown  Pt with poor wound healing of right BKA site as well as left scapular region where free flap was previously extracted. He has already completed 2 week course oxacillin for MSSA. Now has started noticing more drainage especially from left scapular wound site. Seen by Plastics today, and sent to ED for  admission to undergo debridement and wound vac placement    -Plastics consult placed for debridement of R BKA and Left scapular wound, planning for procedure early this week  -discuss whether abx coverage is necessary for draining wounds at this time   -Holding home AC for possible procedure  -Consult wound care for management once pt has debridement and wound vac placed  -NPO since midnight, going for debridement today      Hypertension  On Losartan, Lasix, hydralazine at home and HTN well controlled. Pt recently prescribed amlodipine but has not picked it up yet    -restart Amlodipine, Lasix, Hydral while inpatient.   -Hold losartan at this time and restart if additional BP meds needed     Insulin pump status  Pt with DM2 with long term use of insulin pump. Pt has insulin pump and CGM with him.     -Consult to endocrine placed for inpatient insulin pump mgmt, appreciate recs  -POCT glucose checks ACHS  -SSI w/ ACHS checks      Gastroesophageal reflux disease without esophagitis  Continue home PPI inpatient      Neuropathy  Controlled with gabapentin at home, continue inpatient      Anxiety  -on Duloxetine at home, restart inpatient        VTE Risk Mitigation (From admission, onward)           Ordered     heparin (porcine) injection 5,000 Units  Every 8 hours         10/25/24 1753     IP VTE HIGH RISK PATIENT  Once         10/25/24 1753     Place sequential compression device  Until discontinued         10/25/24 1753                    Discharge Planning   JAYLEEN:      Code Status: Full Code   Is the patient medically ready for discharge?:     Reason for patient still in hospital (select all that apply): Patient trending condition and Treatment  Discharge Plan A: Home with family, Home Health                  Duane Ospina MD  Department of Hospital Medicine   [facility] - Internal Medicine Telemetry

## 2024-10-27 NOTE — OP NOTE
[facility] - Internal Medicine Telemetry  Plastic Surgery  Operative Note    SUMMARY     Date of Procedure: 10/27/2024     Location:  [facility]    Procedure(s): Procedure(s) (LRB):  DEBRIDEMENT, WOUND, BACK (N/A)  APPLICATION, WOUND VAC     Debridement of left back wound skin and subcutaneous fat, 48 cm2  Placement of negative pressure wound VAC, less than 50 cm2    Surgeons and Role:     * Juanito Cueto DO - Primary     * Dario Rankin MD - Resident - Assisting     * Destin Enamorado MD - Resident - Assisting    Assistant: Dario Rankin DO, Fellow and Blake Enamorado MD, Fellow    Pre-Operative Diagnosis: Visit for wound check [Z51.89]    Post-Operative Diagnosis: same    Anesthesia: General    Indications for Procedure:  This is a 64-year-old male with a history of attempted limb salvage.  He underwent failed combined scapular and parascapular free flap.  He had some dusky skin at the donor site.  It was developed full-thickness necrosis with the area of wound dehiscence.  It was taken back to the operating room today for debridement and wound VAC placement    Operative Findings (including complications, if any):   Two areas of his left upper back wound were sharply debrided.  Superior area measured 8 x 2 cm in the lower area was 8 x 4 cm  Wound VAC was placed for his wound opening in the central portion    Description of Procedures:  Patient was seen preoperatively on the floor.  Surgical plan was discussed.  All questions were answered.  Surgical consent has been signed.    Patient was taken the operating room general anesthesia was induced.  He was placed in the right lateral decubitus position.  The left upper back was prepped and draped in a sterile fashion.    The patient had a v-shaped wound with a central area of necrosis from his previous flap harvest.  All of the staples were removed.  He had less than typical amount of wound healing considered how far out he was from surgery.  A 10  blade scalpel was used to sharply debride the wound.  The most anterior area was a partially opened with the staples were removed.  The area including the skin and subcutaneous fat was sharply excised.  It was measured 8 x 2 cm.  He was a larger wound of partial-thickness skin necrosis that was debrided back to healthy bleeding tissue.  It was also extended inferiorly.  All of the area was excised.  It was measured 8 x 4 cm.    Hemostasis was achieved using the Bovie.  It was able to partially close down both of these wounds.  They were closed with staples in the superior area in the lower incision was closed with a few buried 0 Vicryl sutures followed by skin staples.  Finally he had an open wound in the central portion.  It was also a large underlying wound cavity it was skin flaps were poorly adherent down to the latissimus muscle it was back.  An Urgotul silver nonadherent layer was put down in the black sponge before it was placed into the wound.  Good seal was obtained it was placed to suction of the wound VAC.    He was also had some partial wound breakdown of his right below-knee amputation residual limb.  It was dressed with a strip of silver nonadherent dressing, cast padding, and a Coban.  Wrapped him with some compression to help for edema.    Patient tolerated the procedure well.  It was taken to recovery in stable condition    Significant Surgical Tasks Conducted by the Assistant(s), if Applicable: The indicated resident served as first assistant for this procedure.    Estimated Blood Loss (EBL): 10mL           Implants: * No implants in log *    Specimens:   Specimen (24h ago, onward)      None                    Condition: Good    Disposition: PACU - hemodynamically stable., Back to the floor, wound team consult for wound vac changes, no further debridement needed    Attestation: I was present and scrubbed for the key portions of the procedure.

## 2024-10-27 NOTE — SUBJECTIVE & OBJECTIVE
"Interval HPI:   Overnight events: No acute events overnight. Patient in room 1110/1110 A. Blood glucose stable. BG at goal on current insulin regimen (Home Insulin Pump). Steroid use- None .    Renal function- Normal   Vasopressors-  None       Endocrine will continue to follow and manage insulin orders inpatient.         Diet NPO     Eating:   NPO  Nausea: No  Hypoglycemia and intervention: No  Fever: No  TPN and/or TF: No  If yes, type of TF/TPN and rate: n/a    /64   Pulse 99   Temp 98.5 °F (36.9 °C) (Oral)   Resp 18   Wt 92.5 kg (204 lb)   SpO2 96%   BMI 33.95 kg/m²     Labs Reviewed and Include    Recent Labs   Lab 10/27/24  0543      CALCIUM 9.0   ALBUMIN 2.2*   PROT 6.4   *   K 4.1   CO2 23      BUN 16   CREATININE 0.9   ALKPHOS 108   ALT 9*   AST 14   BILITOT 0.3     Lab Results   Component Value Date    WBC 12.76 (H) 10/27/2024    HGB 10.0 (L) 10/27/2024    HCT 30.6 (L) 10/27/2024    MCV 88 10/27/2024     10/27/2024     No results for input(s): "TSH", "FREET4" in the last 168 hours.  Lab Results   Component Value Date    HGBA1C 8.5 (H) 09/06/2024       Nutritional status:   Body mass index is 33.95 kg/m².  Lab Results   Component Value Date    ALBUMIN 2.2 (L) 10/27/2024    ALBUMIN 2.4 (L) 10/26/2024    ALBUMIN 2.7 (L) 10/25/2024     Lab Results   Component Value Date    PREALBUMIN 3 (L) 09/06/2024    PREALBUMIN 13 (L) 07/18/2024       Estimated Creatinine Clearance: 87.8 mL/min (based on SCr of 0.9 mg/dL).    Accu-Checks  Recent Labs     10/25/24  1859 10/27/24  0803   POCTGLUCOSE 191* 143*       Current Medications and/or Treatments Impacting Glycemic Control  Immunotherapy:    Immunosuppressants       None          Steroids:   Hormones (From admission, onward)      None          Pressors:    Autonomic Drugs (From admission, onward)      None          Hyperglycemia/Diabetes Medications:   Antihyperglycemics (From admission, onward)      Start     Stop Route Frequency " Ordered    10/26/24 1245  insulin lispro insulin pump from home        Question Answer Comment   Target number 110    Basal Rate #1 1.55    Basal rate #1 time 9396-3495    Basal Rate #2 2.3    Basal rate #2 time 6395-8167        -- SubQ Continuous 10/26/24 1140    10/26/24 1239  insulin lispro insulin pump from home 1-10 Units        Question Answer Comment   Target number 110    Carbohydrate coverage #1 1:3    Carbohydrate coverage #1 time 0000    Carbohydrate coverage #2 1:2    Carbohydrate coverage #2 time 1600    Sensitivity #1 1:20    Sensitivity #1 time [id number]        -- SubQ As needed (PRN) 10/26/24 1140

## 2024-10-27 NOTE — NURSING TRANSFER
Nursing Transfer Note      10/27/2024   5:07 PM    Nurse giving handoff:francheska yeung  Nurse receiving handoff:FRANCHESKA guerrier    Reason patient is being transferred:per md order    Transfer To: 1110    Transfer via bed    Transfer with wound vac    Transported by pt escort    Order for Tele Monitor? No    Additional Lines: wound vac    Medicines sent: n/a    Any special needs or follow-up needed: n/a    Patient belongings transferred with patient:  n/a    Chart send with patient: Yes    Notified: friend

## 2024-10-27 NOTE — PLAN OF CARE
Goals to be met by: 11/20/24     Patient will increase functional independence with ADLs by performing:    Grooming while standing at sink with Modified [place].  Toileting from toilet with Modified [place] for hygiene and clothing management.   Toilet transfer to toilet with Modified [place].  Upper extremity exercise program 3x10 reps per handout, with independence.    DME: Pt reports friend had RW for him, it turned out bariatric and does not fit through his doorways.   Hence, Patient demonstrates a mobility limitation that significantly impairs their ability to participate in one or more mobility related activities of daily living. Patient's mobility limitation cannot be sufficiently resolved with the use of a cane, but can be sufficiently resolved with the use of a rolling walker.The use of a rolling walker will considerably improve their ability to participate in MRADLs. Patient will use the walker on a regular basis at home.

## 2024-10-27 NOTE — ASSESSMENT & PLAN NOTE
BG goal 140-180  NPO this morning for wound debridement  Patient took his home insulin pump off early this morning. Patient will remain off insulin pump for the remainder of hospital stay as he is out of home insulin for his pump.     Start Lantus 16 units once daily (50% reduction from home basal given NPO status and patient reporting frequent hypoglycemia when NPO)   Low Dose Correction Scale  BG monitoring q 4 hrs while NPO  Please notify Endocrine if diet is resumed for appropriate scheduled Novolog orders. Plan for the following once diet progresses.   Novolog 2-6 units TIDWM (Administer 2 units if patient eats 25% of meal, 4  units if patient eats 50% of meal, administer 5 units if the patient eats 75% of meal and administer and 6 units if patient eats 100% of meal. Hold if patient eats less than 25% of meal).    ** Please call Endocrine for any BG related issues **

## 2024-10-27 NOTE — PT/OT/SLP EVAL
Occupational Therapy   Evaluation    Name: Cortes Schroeedr  MRN: [id number]  Admitting Diagnosis: Surgical wound breakdown  Recent Surgery: Procedure(s) (LRB):  DEBRIDEMENT, WOUND, BACK (N/A) Day of Surgery    Recommendations:     Discharge Recommendations: Low Intensity Therapy  Discharge Equipment Recommendations:  walker, rolling  Barriers to discharge:  None    Assessment:     Cortes Schroeder is a 64 y.o. male with a medical diagnosis of Surgical wound breakdown.  He presents with Performance deficits affecting function: gait instability, impaired endurance, impaired balance, decreased upper extremity function, weakness, impaired self care skills, impaired functional mobility, impaired skin.  Pt presents in bed, agreeable to tx and motivated to keep strength for IND despite poor wound healing and need for follow up sx. Pt had achieved Sunita at [facility] but had mild decline since d/t pain and feeling poor limiting participation in mobility and ADLs. Pt reports difficulty accommodating to new mobility needs in home environment including need for HH to aid in developing safe bathing routine once cleared by wound care.    Pt is a good candidate for low intensity tx at the post acute level to address deficits impacting safety and IND with daily task in and around the home.      Rehab Prognosis: Good; patient would benefit from acute skilled OT services to address these deficits and reach maximum level of function.       Plan:     Patient to be seen 3 x/week to address the above listed problems via self-care/home management, therapeutic exercises, therapeutic activities, neuromuscular re-education, wheelchair management/training  Plan of Care Expires: 11/20/24  Plan of Care Reviewed with: patient    Subjective     Chief Complaint: need for follow up sx  Patient/Family Comments/goals: To regain strength in UE/Core for mobility, to be in good position for residual limb shaping in anticipation for prosthesis  soon    Occupational Profile:  Living Environment: With friends in [facility] no AMY, handicap toilet, WIS with door, stool, and hand held sprayer.   Previous level of function: Sunita with w/c since d/c from rehab, pt does not work (on disability)  Equipment Used at Home: wheelchair, grab bar, other (see comments) (RW ordered is bariatric and does not fit his doorways at home)  Assistance upon Discharge: roommate    Pain/Comfort:  Pain Rating 1: 0/10  Pain Rating Post-Intervention 1: 0/10    Patients cultural, spiritual, Spiritism conflicts given the current situation: no    Objective:     Communicated with: keegan prior to session.  Patient found HOB elevated with telemetry upon OT entry to room.    General Precautions: Standard, fall  Orthopedic Precautions: N/A  Braces: N/A  Respiratory Status: Room air    Occupational Performance:    Bed Mobility:    Patient completed Rolling/Turning to Right with supervision  Patient completed Scooting/Bridging with supervision  Patient completed Supine to Sit with supervision\  Sits EOB SUP for safety d/t extended time at bed level    Functional Mobility/Transfers:  Patient completed Sit <> Stand Transfer with supervision  with  rolling walker x3 in total  Functional Mobility: Pt ambulates with RW and hopping technique d/t not having personal W/C present >30ft as in home distances. Pt reports typically having greater tolerance, mild light headedness long term indicating return to room. Good balance and seqeuncing t/o, pt is safety aware and communicative of symptoms.     Activities of Daily Living:  Grooming: setup seated  Upper Body Dressing: modified independence given RANGEL EOB to change gown  Lower Body Dressing: setup EOB to don sock    Cognitive/Visual Perceptual:  Cognitive/Psychosocial Skills:     -       Oriented to: Person, Place, Time, and Situation   -       Follows Commands/attention:Follows multistep  commands  -       Safety awareness/insight to disability: intact     Physical  Exam:  Postural examination/scapula alignment:    -       Forward head  Skin integrity: Visible skin intact and Wound L upper back (going for I&D this afternoon)  Dominant hand:    -       Right  Upper Extremity Range of Motion:     -       Right Upper Extremity: WFL  -       Left Upper Extremity: WFL  Upper Extremity Strength:    -       Right Upper Extremity: WFL  -       Left Upper Extremity: WFL  Fine Motor Coordination:    -       Intact    AMPAC 6 Click ADL:  AMPAC Total Score: 21    Treatment & Education:  -Education on energy conservation and task modification to maximize safety and (I) during ADLs and mobility  -Education on importance of OOB activity to improve overall activity tolerance and promote recovery  -Pt educated to call for assistance and to transfer with hospital staff only  -Provided education regarding role of OT, POC, & discharge recommendations with pt verbalizing understanding.      Pt had no further questions & when asked whether there were any concerns pt reported none.     Patient left up in chair with all lines intact, call button in reach, nsg notified, and doors propped as pt reports claustrophobia in small space and double doors (neg pressure room)    GOALS:   Multidisciplinary Problems       Occupational Therapy Goals          Problem: Occupational Therapy    Goal Priority Disciplines Outcome Interventions   Occupational Therapy Goal     OT, PT/OT Progressing    Description: Goals to be met by: 11/20/24     Patient will increase functional independence with ADLs by performing:    Grooming while standing at sink with Modified [place].  Toileting from toilet with Modified [place] for hygiene and clothing management.   Toilet transfer to toilet with Modified [place].  Upper extremity exercise program 3x10 reps per handout, with independence.    DME: Pt reports friend had RW for him, it turned out bariatric and does not fit through his doorways.   Hence, Patient  demonstrates a mobility limitation that significantly impairs their ability to participate in one or more mobility related activities of daily living. Patient's mobility limitation cannot be sufficiently resolved with the use of a cane, but can be sufficiently resolved with the use of a rolling walker.The use of a rolling walker will considerably improve their ability to participate in MRADLs. Patient will use the walker on a regular basis at home.                           History:     Past Medical History:   Diagnosis Date    Anxiety 12/18/2012    Back pain 12/18/2012    Cataract     Chronic pain syndrome 04/24/2016    Diabetes type 2, controlled 02/20/2016    Diabetic retinopathy     DM (diabetes mellitus) 12/18/2012    DM (diabetes mellitus), type 2, uncontrolled 11/16/2013    Gastroesophageal reflux disease without esophagitis 02/20/2016    Hyperlipidemia 12/18/2012    Insomnia 08/07/2014    Neuropathy 11/16/2013         Past Surgical History:   Procedure Laterality Date    ANGIOGRAPHY OF LOWER EXTREMITY Right 9/17/2024    Procedure: Angiogram Extremity Unilateral;  Surgeon: GINA Morton II, MD;  Location: [facility] OR [address] [place] [address];  Service: Vascular;  Laterality: Right;  Fluro time 19.5 min  mGy 260.49    ANKLE HARDWARE REMOVAL Right 9/23/2024    Procedure: REMOVAL, HARDWARE, ANKLE;  Surgeon: Moe Liz MD;  Location: [facility] OR [address] [place] [address];  Service: Orthopedics;  Laterality: Right;    APPLICATION OF WOUND VACUUM-ASSISTED CLOSURE DEVICE Right 9/6/2024    Procedure: APPLICATION, WOUND VAC;  Surgeon: Moe Liz MD;  Location: [facility] OR [address] [place] [address];  Service: Orthopedics;  Laterality: Right;    APPLICATION, EXTERNAL FIXATION DEVICE, FOR ANKLE FRACTURE Right 7/18/2024    Procedure: APPLICATION, EXTERNAL FIXATION DEVICE, FOR ANKLE FRACTURE;  Surgeon: Moe Liz MD;  Location: [facility] OR [address] [place] [address];  Service: Orthopedics;  Laterality: Right;    ARTHROTOMY OF ANKLE Right 9/9/2024    Procedure: ARTHROTOMY, ANKLE;   Surgeon: Moe Liz MD;  Location: [address];  Service: Orthopedics;  Laterality: Right;    BACK SURGERY  2003    Lumbar Spine    BELOW KNEE AMPUTATION OF LOWER EXTREMITY Right 10/1/2024    Procedure: AMPUTATION, BELOW KNEE - RIGHT;  Surgeon: Moe Liz MD;  Location: [address];  Service: Orthopedics;  Laterality: Right;  with wound vac placement    CATARACT EXTRACTION      CLOSED REDUCTION, FRACTURE, ANKLE, TRIMALLEOLAR Right 7/18/2024    Procedure: CLOSED REDUCTION, FRACTURE, ANKLE, TRIMALLEOLAR;  Surgeon: Moe Liz MD;  Location: [facility] OR [address] [place] [address];  Service: Orthopedics;  Laterality: Right;    EPIDURAL STEROID INJECTION N/A 1/16/2019    Procedure: Injection, Steroid, Epidural Cervical;  Surgeon: Andrew Sinclair Jr., MD;  Location: [facility];  Service: Pain Management;  Laterality: N/A;  Cervical Epidural Steroid Injection C7-T1    80490    Arrive @ 1240    EPIDURAL STEROID INJECTION N/A 2/20/2019    Procedure: Injection, Steroid, Epidural;  Surgeon: Andrew Sinclair Jr., MD;  Location: [facility];  Service: Pain Management;  Laterality: N/A;  Lumbar Epidural Steroid Injection L4-5    44553    Arrive @ 1215 (requests latest time)    FIXATION OF SYNDESMOSIS OF ANKLE Right 7/26/2024    Procedure: FIXATION, SYNDESMOSIS, ANKLE;  Surgeon: Moe Liz MD;  Location: [address];  Service: Orthopedics;  Laterality: Right;    FLAP PROCEDURE Right 9/26/2024    Procedure: CREATION, FREE FLAP;  Surgeon: Juanito Cueto DO;  Location: [address];  Service: Plastics;  Laterality: Right;  Spy; Def Free Flap; Micro instr., Microscope; 2 bovies, 2 teams    INJECTION, SPINE, LUMBOSACRAL, TRANSFORAMINAL APPROACH Bilateral 6/14/2024    Procedure: Bilateral L5 Transforaminal Epidural Steroid Injections;  Surgeon: Andrew Sinclair Jr., MD;  Location: [facility];  Service: Pain Management;  Laterality: Bilateral;  @1300(given)  ASA last 6/8  Check BG  MD  Sign.    IRRIGATION AND DEBRIDEMENT Right 9/6/2024    Procedure: IRRIGATION AND DEBRIDEMENT;  Surgeon: Moe Liz MD;  [facility];  Service: Orthopedics;  Laterality: Right;    IRRIGATION AND DEBRIDEMENT Right 9/20/2024    Procedure: IRRIGATION AND DEBRIDEMENT;  Surgeon: Moe Liz MD;  [facility];  Service: Orthopedics;  Laterality: Right;    IRRIGATION AND DEBRIDEMENT OF LOWER EXTREMITY Right 9/9/2024    Procedure: IRRIGATION AND DEBRIDEMENT, LOWER EXTREMITY - WITH WOUND VAC CHANGE, RIGHT ANKLE;  Surgeon: Moe Liz MD;  [facility];  Service: Orthopedics;  Laterality: Right;  WITH WOUND VAC CHANGE, RIGHT ANKLE    OPEN REDUCTION AND INTERNAL FIXATION (ORIF) OF FRACTURE OF DISTAL RADIUS Left 7/19/2024    Procedure: ORIF, FRACTURE, RADIUS, DISTAL - LEFT;  Surgeon: Moe Liz MD;  [facility];  Service: Orthopedics;  Laterality: Left;  LEFT, SYNTHES, C ARM    OPEN REDUCTION AND INTERNAL FIXATION (ORIF) OF INJURY OF ANKLE Right 7/26/2024    Procedure: ORIF, ANKLE;  Surgeon: Moe Liz MD;  [facility];  Service: Orthopedics;  Laterality: Right;    REMOVAL OF EXTERNAL FIXATION DEVICE Right 7/26/2024    Procedure: REMOVAL, EXTERNAL FIXATION DEVICE;  Surgeon: Moe Liz MD;  Location: [facility] OR [address] [place] [address];  Service: Orthopedics;  Laterality: Right;    REPLACEMENT OF WOUND VACUUM-ASSISTED CLOSURE DEVICE Right 9/17/2024    Procedure: REPLACEMENT, WOUND VAC;  Surgeon: Moe iLz MD;  [facility];  Service: Orthopedics;  Laterality: Right;  wound vac dressing exchange    REPLACEMENT OF WOUND VACUUM-ASSISTED CLOSURE DEVICE Right 9/20/2024    Procedure: REPLACEMENT, WOUND VAC;  Surgeon: Moe Liz MD;  Location: [facility] OR [address] [place] [address];  Service: Orthopedics;  Laterality: Right;    REPLACEMENT OF WOUND VACUUM-ASSISTED CLOSURE DEVICE Right 9/23/2024    Procedure: REPLACEMENT, WOUND VAC; white  & black sponge;  Surgeon: Moe Liz MD;  Location: [facility] OR [address] [place] [address];  Service: Orthopedics;  Laterality: Right;       Time Tracking:     OT Date of Treatment: 10/27/24  OT Start Time: 0940  OT Stop Time: 1010  OT Total Time (min): 30 min    Billable Minutes:Evaluation 6  Self Care/Home Management 15  Therapeutic Activity 9    10/27/2024

## 2024-10-27 NOTE — PROGRESS NOTES
Plastic and Reconstructive Surgery   Progress Note    Subjective:    No issues overnight  Pain controlled  OR today    Objective:  Vital signs in last 24 hours:  Temp:  [98.3 °F (36.8 °C)-99.6 °F (37.6 °C)] 98.3 °F (36.8 °C)  Pulse:  [] 98  Resp:  [18] 18  SpO2:  [95 %-100 %] 100 %  BP: (102-130)/(62-74) 126/66    Intake/Output last 3 shifts:  I/O last 3 completed shifts:  In: -   Out: 850 [Urine:850]    Intake/Output this shift:  I/O this shift:  In: -   Out: 350 [Urine:350]        Physical Exam:  VITAL SIGNS:   Vitals:    10/27/24 0601 10/27/24 0735 10/27/24 1127 10/27/24 1140   BP: 124/63 119/64 126/66    BP Location: Left arm      Patient Position: Lying      Pulse: 99 99 98    Resp: 18 18 18    Temp: 99.1 °F (37.3 °C) 98.5 °F (36.9 °C) 98.3 °F (36.8 °C)    TempSrc: Oral Oral Oral    SpO2: 97% 96% 98% 100%   Weight:         TMAX: Temp (24hrs), Av °F (37.2 °C), Min:98.3 °F (36.8 °C), Max:99.6 °F (37.6 °C)    General: Alert; No acute distress  Cardiovascular: Regular rate   Respiratory: Normal respiratory effort. Chest rise symmetric.   Abdomen: Soft, nontender, nondistended  Extremity: Moves all extremities equally.  Neurologic: No focal deficit. Speech normal    Wound Left upper back with dehiscence   Other skin staples in place  Surrounding erythema      Scheduled Medications aluminum-magnesium hydroxide-simethicone, 30 mL, QID (AC & HS)  amLODIPine, 10 mg, Daily  doxycycline, 100 mg, BID  DULoxetine, 60 mg, Daily  gabapentin, 300 mg, BID  heparin (porcine), 5,000 Units, Q8H  hydrALAZINE, 50 mg, Q8H  insulin glargine U-100, 16 Units, Daily  pantoprazole, 40 mg, Daily      PRN Medications   Current Facility-Administered Medications:     acetaminophen, 650 mg, Oral, Q6H PRN    dextrose 10%, 12.5 g, Intravenous, PRN    dextrose 10%, 12.5 g, Intravenous, PRN    dextrose 10%, 25 g, Intravenous, PRN    dextrose 10%, 25 g, Intravenous, PRN    glucagon (human recombinant), 1 mg, Intramuscular, PRN    glucose,  16 g, Oral, PRN    glucose, 24 g, Oral, PRN    insulin aspart U-100, 0-10 Units, Subcutaneous, QID (AC + HS) PRN    methocarbamoL, 500 mg, Oral, QID PRN    naloxone, 0.02 mg, Intravenous, PRN    senna-docusate 8.6-50 mg, 1 tablet, Oral, Daily PRN    sodium chloride 0.9%, 10 mL, Intravenous, Q12H PRN    traZODone, 50 mg, Oral, Nightly PRN    Recent Labs:   Lab Results   Component Value Date    WBC 12.76 (H) 10/27/2024    HGB 10.0 (L) 10/27/2024    HCT 30.6 (L) 10/27/2024    MCV 88 10/27/2024     10/27/2024     Lab Results   Component Value Date     10/27/2024     (L) 10/27/2024    K 4.1 10/27/2024     10/27/2024    BUN 16 10/27/2024         Assessment: 64 y.o. y/o male * No surgery found * s/p Procedure(s):  DEBRIDEMENT, WOUND, BACK     Plan  OR today for back wound debridement and wound VAC application    Discussed with patient and/or family the risks and benefits of surgical intervention.  Conservative measures have been exhausted and patient would like to proceed with surgery.      We have discussed risks, which include but are not limited to blood clots in the legs that can travel to the lungs (pulmonary embolism). Pulmonary embolism can cause shortness of breath, chest pain, and even shock. Other risks include urinary tract infection, nausea and vomiting (usually related to pain medication), chronic pain, bleeding, nerve damage, blood vessel injury, scarring and infection, which can require re-operation. Furthermore, the risks of anesthesia include potential heart, lung, kidney, and liver damage.  Informed consent was obtained.  The patient understands and would like to proceed with surgery.    NPO/IVF  Pain control  Milagroy ubaldo Enamorado MD  Plastic Surgery Fellow  10/27/2024

## 2024-10-27 NOTE — ASSESSMENT & PLAN NOTE
Pt with poor wound healing of right BKA site as well as left scapular region where free flap was previously extracted. He has already completed 2 week course oxacillin for MSSA. Now has started noticing more drainage especially from left scapular wound site. Seen by Plastics today, and sent to ED for admission to undergo debridement and wound vac placement    -Plastics consult placed for debridement of R BKA and Left scapular wound, planning for procedure early this week  -discuss whether abx coverage is necessary for draining wounds at this time   -Holding home AC for possible procedure  -Consult wound care for management once pt has debridement and wound vac placed  -NPO since midnight, going for debridement today

## 2024-10-27 NOTE — NURSING
"Pt expressed concern regarding his insulin pump and potential for hypoglycemia. He reports he will discontinue his pump at 0700 and requests that staff check his blood glucose levels for the remainder of his admission.     Per Med Team 3 via secure chat "Will let day team know ASAP in the morning."    Plan of care ongoing.   "

## 2024-10-27 NOTE — PLAN OF CARE
Problem: Skin Injury Risk Increased  Goal: Skin Health and Integrity  Outcome: Progressing     Problem: Adult Inpatient Plan of Care  Goal: Plan of Care Review  Outcome: Progressing  Goal: Patient-Specific Goal (Individualized)  Outcome: Progressing  Goal: Absence of Hospital-Acquired Illness or Injury  Outcome: Progressing     Problem: Wound  Goal: Optimal Coping  Outcome: Progressing  Goal: Improved Oral Intake  Outcome: Progressing  Goal: Optimal Pain Control and Function  Outcome: Progressing

## 2024-10-27 NOTE — ASSESSMENT & PLAN NOTE
Pt with DM2 with long term use of insulin pump. Pt has insulin pump and CGM with him.     -Consult to endocrine placed for inpatient insulin pump mgmt, appreciate recs  -POCT glucose checks ACHS  -SSI w/ ACHS checks

## 2024-10-27 NOTE — SUBJECTIVE & OBJECTIVE
Interval History: NAEO pt stable this morning and feels well. NPO since midnight planned for surgery today.    Review of Systems  Objective:     Vital Signs (Most Recent):  Temp: 98.5 °F (36.9 °C) (10/27/24 0735)  Pulse: 99 (10/27/24 0735)  Resp: 18 (10/27/24 0735)  BP: 119/64 (10/27/24 0735)  SpO2: 96 % (10/27/24 0735) Vital Signs (24h Range):  Temp:  [98.5 °F (36.9 °C)-99.6 °F (37.6 °C)] 98.5 °F (36.9 °C)  Pulse:  [] 99  Resp:  [18] 18  SpO2:  [95 %-99 %] 96 %  BP: (102-130)/(62-74) 119/64     Weight: 92.5 kg (204 lb)  Body mass index is 33.95 kg/m².    Intake/Output Summary (Last 24 hours) at 10/27/2024 0835  Last data filed at 10/27/2024 0829  Gross per 24 hour   Intake --   Output 800 ml   Net -800 ml         Physical Exam  HENT:      Head: Atraumatic.   Eyes:      Extraocular Movements: Extraocular movements intact.      Pupils: Pupils are equal, round, and reactive to light.   Cardiovascular:      Rate and Rhythm: Normal rate and regular rhythm.      Pulses: Normal pulses.      Heart sounds: Normal heart sounds.   Pulmonary:      Effort: Pulmonary effort is normal.      Breath sounds: Normal breath sounds.   Abdominal:      General: Bowel sounds are normal.      Palpations: Abdomen is soft.   Musculoskeletal:      Comments: -poorly healed wound in place to left scapular area at previous flap site. Staples still in place, and minimal clear yellow drainage present  -R BKA site w/ poor healing wound   Neurological:      Mental Status: He is alert and oriented to person, place, and time.             Significant Labs: All pertinent labs within the past 24 hours have been reviewed.  CBC:   Recent Labs   Lab 10/25/24  1524 10/25/24  1531 10/26/24  0544 10/27/24  0543   WBC 12.40  --  10.65 12.76*   HGB 11.5*  --  10.8* 10.0*   HCT 35.7* 37 33.3* 30.6*     --  375 345     CMP:   Recent Labs   Lab 10/25/24  1524 10/26/24  0544 10/27/24  0543   * 138 134*   K 4.7 3.8 4.1   CL 96 101 101   CO2 26 28  23   * 112* 107   BUN 20 14 16   CREATININE 1.0 0.8 0.9   CALCIUM 9.6 9.2 9.0   PROT 7.6 6.8 6.4   ALBUMIN 2.7* 2.4* 2.2*   BILITOT 0.2 0.3 0.3   ALKPHOS 135 116 108   AST 16 12 14   ALT 10 9* 9*   ANIONGAP 11 9 10       Significant Imaging: I have reviewed all pertinent imaging results/findings within the past 24 hours.

## 2024-10-27 NOTE — PROGRESS NOTES
[facility] - Internal Medicine Telemetry  Endocrinology  Progress Note    Admit Date: 10/25/2024     Reason for Consult: Management of T2DM, Hyperglycemia     Diabetes diagnosis year: 1995    Home Diabetes Medications:      Humalog via OmniPod insulin pump  Mounjaro 10 mg weekly     Current pump settings:  Basal 12 am to 2.3 and 6 am to 1.55  ICR to 3 at 12 am, 2 at 4 pm  ISF to 1:20   AIT 3 hrs  Target 110-120    Patient had anaphylaxis reaction to SGLT2 inhibitors specifically Invokana     Lab Results   Component Value Date    LABA1C 10.8 (H) 08/15/2016    HGBA1C 8.5 (H) 09/06/2024       How often checking glucose at home? Dexcom G6    BG readings on regimen: 130-200s  Hypoglycemia on the regimen?  No  Missed doses on regimen?  No    Diabetes Complications include:     Hyperglycemia and Diabetic peripheral neuropathy         Complicating diabetes co morbidities:   HLD, HTN, Obesity     HPI:   Patient is a 64 y.o. male with a diagnosis of insulin dependent type 2 diabetes mellitus with insulin pump, polyneuropathy due to type 2 diabetes mellitus, peripheral vascular disease, hyperlipidemia, And fall resulting in ORIF of right ankle in July of this year. Course parascapular free flap that did not take that ultimately resulted in getting BKA done 10/1. He was also found to have MSSA at that time. He was sent to Rehab after BKA procedure was done, with 2 week course IV oxacillin. Pt d/c from rehab on 10/20, and went to f/u w/ Plastic surgery today who noticed that he now has a non-healing wound to his back where his parascapular flap was extracted. He had also been complaining of some N/V and constipation. He was sent to the ED for admission, so he could undergo workup to r/o SBO as well as undergo debridement of his BKA and back wound with wound vac placement. In the ED, pt was afebrile HDS. CBC was done that showed WBC of 11, and CMP showed elevated glucose at 269. CT abd/pelvis was obtained to r/o SBO which was  "negative. Plastic Surgery team planning to see pt and do inpatient debridement procedure.Endocrinology consulted for management of T2DM.          Interval HPI:   Overnight events: No acute events overnight. Patient in room 1110/1110 A. Blood glucose stable. BG at goal on current insulin regimen (Home Insulin Pump). Steroid use- None .    Renal function- Normal   Vasopressors-  None       Endocrine will continue to follow and manage insulin orders inpatient.         Diet NPO     Eating:   NPO  Nausea: No  Hypoglycemia and intervention: No  Fever: No  TPN and/or TF: No  If yes, type of TF/TPN and rate: n/a    /64   Pulse 99   Temp 98.5 °F (36.9 °C) (Oral)   Resp 18   Wt 92.5 kg (204 lb)   SpO2 96%   BMI 33.95 kg/m²     Labs Reviewed and Include    Recent Labs   Lab 10/27/24  0543      CALCIUM 9.0   ALBUMIN 2.2*   PROT 6.4   *   K 4.1   CO2 23      BUN 16   CREATININE 0.9   ALKPHOS 108   ALT 9*   AST 14   BILITOT 0.3     Lab Results   Component Value Date    WBC 12.76 (H) 10/27/2024    HGB 10.0 (L) 10/27/2024    HCT 30.6 (L) 10/27/2024    MCV 88 10/27/2024     10/27/2024     No results for input(s): "TSH", "FREET4" in the last 168 hours.  Lab Results   Component Value Date    HGBA1C 8.5 (H) 09/06/2024       Nutritional status:   Body mass index is 33.95 kg/m².  Lab Results   Component Value Date    ALBUMIN 2.2 (L) 10/27/2024    ALBUMIN 2.4 (L) 10/26/2024    ALBUMIN 2.7 (L) 10/25/2024     Lab Results   Component Value Date    PREALBUMIN 3 (L) 09/06/2024    PREALBUMIN 13 (L) 07/18/2024       Estimated Creatinine Clearance: 87.8 mL/min (based on SCr of 0.9 mg/dL).    Accu-Checks  Recent Labs     10/25/24  1859 10/27/24  0803   POCTGLUCOSE 191* 143*       Current Medications and/or Treatments Impacting Glycemic Control  Immunotherapy:    Immunosuppressants       None          Steroids:   Hormones (From admission, onward)      None          Pressors:    Autonomic Drugs (From admission, " onward)      None          Hyperglycemia/Diabetes Medications:   Antihyperglycemics (From admission, onward)      Start     Stop Route Frequency Ordered    10/26/24 1245  insulin lispro insulin pump from home        Question Answer Comment   Target number 110    Basal Rate #1 1.55    Basal rate #1 time 0103-8351    Basal Rate #2 2.3    Basal rate #2 time 4103-4870        -- SubQ Continuous 10/26/24 1140    10/26/24 1239  insulin lispro insulin pump from home 1-10 Units        Question Answer Comment   Target number 110    Carbohydrate coverage #1 1:3    Carbohydrate coverage #1 time 0000    Carbohydrate coverage #2 1:2    Carbohydrate coverage #2 time 1600    Sensitivity #1 1:20    Sensitivity #1 time 7080-7811        -- SubQ As needed (PRN) 10/26/24 1140            ASSESSMENT and PLAN    Endocrine  Class 1 obesity due to excess calories with serious comorbidity and body mass index (BMI) of 34.0 to 34.9 in adult  Body mass index is 33.95 kg/m².  May increase insulin resistance.         Insulin pump status  Insulin Pump orders d/c at this time      Uncontrolled type 2 diabetes mellitus with hyperglycemia  BG goal 140-180  NPO this morning for wound debridement  Patient took his home insulin pump off early this morning. Patient will remain off insulin pump for the remainder of hospital stay as he is out of home insulin for his pump.     Start Lantus 16 units once daily (50% reduction from home basal given NPO status and patient reporting frequent hypoglycemia when NPO)   Low Dose Correction Scale  BG monitoring q 4 hrs while NPO  Please notify Endocrine if diet is resumed for appropriate scheduled Novolog orders. Plan for the following once diet progresses.   Novolog 2-6 units TIDWM (Administer 2 units if patient eats 25% of meal, 4  units if patient eats 50% of meal, administer 5 units if the patient eats 75% of meal and administer and 6 units if patient eats 100% of meal. Hold if patient eats less than 25% of  meal).    ** Please call Endocrine for any BG related issues **        Orthopedic  * Surgical wound breakdown  Managed per primary team  Optimize BG control            Meg Penaloza NP  Endocrinology  [facility] - Internal Medicine Telemetry

## 2024-10-28 ENCOUNTER — TELEPHONE (OUTPATIENT)
Dept: INFECTIOUS DISEASES | Facility: CLINIC | Age: 64
End: 2024-10-28
Payer: COMMERCIAL

## 2024-10-28 LAB
ANION GAP SERPL CALC-SCNC: 10 MMOL/L (ref 8–16)
BASOPHILS # BLD AUTO: 0.06 K/UL (ref 0–0.2)
BASOPHILS NFR BLD: 0.6 % (ref 0–1.9)
BUN SERPL-MCNC: 19 MG/DL (ref 8–23)
CALCIUM SERPL-MCNC: 8.6 MG/DL (ref 8.7–10.5)
CHLORIDE SERPL-SCNC: 100 MMOL/L (ref 95–110)
CO2 SERPL-SCNC: 21 MMOL/L (ref 23–29)
CREAT SERPL-MCNC: 1 MG/DL (ref 0.5–1.4)
DIFFERENTIAL METHOD BLD: ABNORMAL
EOSINOPHIL # BLD AUTO: 0 K/UL (ref 0–0.5)
EOSINOPHIL NFR BLD: 0.2 % (ref 0–8)
ERYTHROCYTE [DISTWIDTH] IN BLOOD BY AUTOMATED COUNT: 14.6 % (ref 11.5–14.5)
EST. GFR  (NO RACE VARIABLE): >60 ML/MIN/1.73 M^2
GLUCOSE SERPL-MCNC: 290 MG/DL (ref 70–110)
HCT VFR BLD AUTO: 27.5 % (ref 40–54)
HGB BLD-MCNC: 8.9 G/DL (ref 14–18)
IMM GRANULOCYTES # BLD AUTO: 0.05 K/UL (ref 0–0.04)
IMM GRANULOCYTES NFR BLD AUTO: 0.5 % (ref 0–0.5)
LYMPHOCYTES # BLD AUTO: 1.2 K/UL (ref 1–4.8)
LYMPHOCYTES NFR BLD: 11.1 % (ref 18–48)
MCH RBC QN AUTO: 29.1 PG (ref 27–31)
MCHC RBC AUTO-ENTMCNC: 32.4 G/DL (ref 32–36)
MCV RBC AUTO: 90 FL (ref 82–98)
MONOCYTES # BLD AUTO: 1.1 K/UL (ref 0.3–1)
MONOCYTES NFR BLD: 10.7 % (ref 4–15)
NEUTROPHILS # BLD AUTO: 8 K/UL (ref 1.8–7.7)
NEUTROPHILS NFR BLD: 76.9 % (ref 38–73)
NRBC BLD-RTO: 0 /100 WBC
PHOSPHATE SERPL-MCNC: 3.6 MG/DL (ref 2.7–4.5)
PLATELET # BLD AUTO: 267 K/UL (ref 150–450)
PMV BLD AUTO: 10.5 FL (ref 9.2–12.9)
POCT GLUCOSE: 179 MG/DL (ref 70–110)
POCT GLUCOSE: 239 MG/DL (ref 70–110)
POCT GLUCOSE: 297 MG/DL (ref 70–110)
POCT GLUCOSE: 303 MG/DL (ref 70–110)
POCT GLUCOSE: 348 MG/DL (ref 70–110)
POCT GLUCOSE: 397 MG/DL (ref 70–110)
POTASSIUM SERPL-SCNC: 4.5 MMOL/L (ref 3.5–5.1)
RBC # BLD AUTO: 3.06 M/UL (ref 4.6–6.2)
SODIUM SERPL-SCNC: 131 MMOL/L (ref 136–145)
WBC # BLD AUTO: 10.37 K/UL (ref 3.9–12.7)

## 2024-10-28 PROCEDURE — 99223 1ST HOSP IP/OBS HIGH 75: CPT | Mod: ,,, | Performed by: STUDENT IN AN ORGANIZED HEALTH CARE EDUCATION/TRAINING PROGRAM

## 2024-10-28 PROCEDURE — 25000003 PHARM REV CODE 250

## 2024-10-28 PROCEDURE — 25000003 PHARM REV CODE 250: Performed by: ANESTHESIOLOGY

## 2024-10-28 PROCEDURE — 63600175 PHARM REV CODE 636 W HCPCS

## 2024-10-28 PROCEDURE — 85025 COMPLETE CBC W/AUTO DIFF WBC: CPT

## 2024-10-28 PROCEDURE — 25000003 PHARM REV CODE 250: Performed by: STUDENT IN AN ORGANIZED HEALTH CARE EDUCATION/TRAINING PROGRAM

## 2024-10-28 PROCEDURE — 63600175 PHARM REV CODE 636 W HCPCS: Performed by: NURSE PRACTITIONER

## 2024-10-28 PROCEDURE — 20600001 HC STEP DOWN PRIVATE ROOM

## 2024-10-28 PROCEDURE — 97162 PT EVAL MOD COMPLEX 30 MIN: CPT

## 2024-10-28 PROCEDURE — 25000003 PHARM REV CODE 250: Performed by: NURSE PRACTITIONER

## 2024-10-28 PROCEDURE — 80048 BASIC METABOLIC PNL TOTAL CA: CPT | Performed by: STUDENT IN AN ORGANIZED HEALTH CARE EDUCATION/TRAINING PROGRAM

## 2024-10-28 PROCEDURE — 97535 SELF CARE MNGMENT TRAINING: CPT

## 2024-10-28 PROCEDURE — 99232 SBSQ HOSP IP/OBS MODERATE 35: CPT | Mod: ,,, | Performed by: NURSE PRACTITIONER

## 2024-10-28 PROCEDURE — 97530 THERAPEUTIC ACTIVITIES: CPT

## 2024-10-28 PROCEDURE — 97605 NEG PRS WND THER DME<=50SQCM: CPT

## 2024-10-28 PROCEDURE — 36415 COLL VENOUS BLD VENIPUNCTURE: CPT

## 2024-10-28 PROCEDURE — 84100 ASSAY OF PHOSPHORUS: CPT

## 2024-10-28 RX ORDER — INSULIN ASPART 100 [IU]/ML
0-10 INJECTION, SOLUTION INTRAVENOUS; SUBCUTANEOUS
Status: DISCONTINUED | OUTPATIENT
Start: 2024-10-28 | End: 2024-10-29

## 2024-10-28 RX ORDER — INSULIN ASPART 100 [IU]/ML
2-6 INJECTION, SOLUTION INTRAVENOUS; SUBCUTANEOUS
Status: DISCONTINUED | OUTPATIENT
Start: 2024-10-28 | End: 2024-10-28

## 2024-10-28 RX ORDER — INSULIN ASPART 100 [IU]/ML
8-12 INJECTION, SOLUTION INTRAVENOUS; SUBCUTANEOUS
Status: DISCONTINUED | OUTPATIENT
Start: 2024-10-28 | End: 2024-10-29

## 2024-10-28 RX ORDER — INSULIN GLARGINE 100 [IU]/ML
24 INJECTION, SOLUTION SUBCUTANEOUS DAILY
Status: DISCONTINUED | OUTPATIENT
Start: 2024-10-29 | End: 2024-10-28

## 2024-10-28 RX ORDER — INSULIN ASPART 100 [IU]/ML
6 INJECTION, SOLUTION INTRAVENOUS; SUBCUTANEOUS
Status: DISCONTINUED | OUTPATIENT
Start: 2024-10-28 | End: 2024-10-28

## 2024-10-28 RX ORDER — AMOXICILLIN AND CLAVULANATE POTASSIUM 875; 125 MG/1; MG/1
1 TABLET, FILM COATED ORAL EVERY 12 HOURS
Status: DISCONTINUED | OUTPATIENT
Start: 2024-10-28 | End: 2024-10-29 | Stop reason: HOSPADM

## 2024-10-28 RX ORDER — INSULIN GLARGINE 100 [IU]/ML
20 INJECTION, SOLUTION SUBCUTANEOUS DAILY
Status: DISCONTINUED | OUTPATIENT
Start: 2024-10-29 | End: 2024-10-28

## 2024-10-28 RX ORDER — IBUPROFEN 200 MG
16 TABLET ORAL
Status: DISCONTINUED | OUTPATIENT
Start: 2024-10-28 | End: 2024-10-29

## 2024-10-28 RX ORDER — GLUCAGON 1 MG
1 KIT INJECTION
Status: DISCONTINUED | OUTPATIENT
Start: 2024-10-28 | End: 2024-10-29

## 2024-10-28 RX ORDER — INSULIN GLARGINE 100 [IU]/ML
8 INJECTION, SOLUTION SUBCUTANEOUS ONCE
Status: COMPLETED | OUTPATIENT
Start: 2024-10-28 | End: 2024-10-28

## 2024-10-28 RX ORDER — IBUPROFEN 200 MG
24 TABLET ORAL
Status: DISCONTINUED | OUTPATIENT
Start: 2024-10-28 | End: 2024-10-29

## 2024-10-28 RX ADMIN — ALUMINUM HYDROXIDE, MAGNESIUM HYDROXIDE, AND SIMETHICONE 30 ML: 200; 200; 20 SUSPENSION ORAL at 06:10

## 2024-10-28 RX ADMIN — INSULIN GLARGINE 16 UNITS: 100 INJECTION, SOLUTION SUBCUTANEOUS at 08:10

## 2024-10-28 RX ADMIN — INSULIN ASPART 8 UNITS: 100 INJECTION, SOLUTION INTRAVENOUS; SUBCUTANEOUS at 05:10

## 2024-10-28 RX ADMIN — AMLODIPINE BESYLATE 10 MG: 10 TABLET ORAL at 08:10

## 2024-10-28 RX ADMIN — AMOXICILLIN AND CLAVULANATE POTASSIUM 1 TABLET: 875; 125 TABLET, FILM COATED ORAL at 08:10

## 2024-10-28 RX ADMIN — INSULIN GLARGINE 8 UNITS: 100 INJECTION, SOLUTION SUBCUTANEOUS at 10:10

## 2024-10-28 RX ADMIN — HEPARIN SODIUM 5000 UNITS: 5000 INJECTION INTRAVENOUS; SUBCUTANEOUS at 06:10

## 2024-10-28 RX ADMIN — GABAPENTIN 300 MG: 300 CAPSULE ORAL at 08:10

## 2024-10-28 RX ADMIN — HYDRALAZINE HYDROCHLORIDE 50 MG: 25 TABLET ORAL at 06:10

## 2024-10-28 RX ADMIN — APIXABAN 2.5 MG: 2.5 TABLET, FILM COATED ORAL at 08:10

## 2024-10-28 RX ADMIN — ALUMINUM HYDROXIDE, MAGNESIUM HYDROXIDE, AND SIMETHICONE 30 ML: 200; 200; 20 SUSPENSION ORAL at 12:10

## 2024-10-28 RX ADMIN — INSULIN ASPART 6 UNITS: 100 INJECTION, SOLUTION INTRAVENOUS; SUBCUTANEOUS at 12:10

## 2024-10-28 RX ADMIN — PANTOPRAZOLE SODIUM 40 MG: 40 TABLET, DELAYED RELEASE ORAL at 08:10

## 2024-10-28 RX ADMIN — DULOXETINE HYDROCHLORIDE 60 MG: 60 CAPSULE, DELAYED RELEASE ORAL at 08:10

## 2024-10-28 RX ADMIN — ALUMINUM HYDROXIDE, MAGNESIUM HYDROXIDE, AND SIMETHICONE 30 ML: 200; 200; 20 SUSPENSION ORAL at 05:10

## 2024-10-28 RX ADMIN — METHOCARBAMOL 500 MG: 500 TABLET ORAL at 03:10

## 2024-10-28 RX ADMIN — HYDRALAZINE HYDROCHLORIDE 50 MG: 25 TABLET ORAL at 01:10

## 2024-10-28 RX ADMIN — INSULIN ASPART 2 UNITS: 100 INJECTION, SOLUTION INTRAVENOUS; SUBCUTANEOUS at 09:10

## 2024-10-28 RX ADMIN — INSULIN HUMAN 0.6 UNITS/HR: 1 INJECTION, SOLUTION INTRAVENOUS at 05:10

## 2024-10-28 RX ADMIN — HYDRALAZINE HYDROCHLORIDE 50 MG: 25 TABLET ORAL at 08:10

## 2024-10-28 RX ADMIN — INSULIN ASPART 10 UNITS: 100 INJECTION, SOLUTION INTRAVENOUS; SUBCUTANEOUS at 12:10

## 2024-10-28 RX ADMIN — INSULIN ASPART 4 UNITS: 100 INJECTION, SOLUTION INTRAVENOUS; SUBCUTANEOUS at 05:10

## 2024-10-28 RX ADMIN — ALUMINUM HYDROXIDE, MAGNESIUM HYDROXIDE, AND SIMETHICONE 30 ML: 200; 200; 20 SUSPENSION ORAL at 08:10

## 2024-10-28 RX ADMIN — TRAZODONE HYDROCHLORIDE 50 MG: 50 TABLET ORAL at 08:10

## 2024-10-28 RX ADMIN — DOXYCYCLINE HYCLATE 100 MG: 100 TABLET, COATED ORAL at 08:10

## 2024-10-28 RX ADMIN — INSULIN ASPART 8 UNITS: 100 INJECTION, SOLUTION INTRAVENOUS; SUBCUTANEOUS at 08:10

## 2024-10-28 RX ADMIN — OXYCODONE 5 MG: 5 TABLET ORAL at 08:10

## 2024-10-28 NOTE — SUBJECTIVE & OBJECTIVE
"Interval HPI:   Overnight events: No acute events overnight. Patient in room 1110/1110 A. Blood glucose worsening. BG above goal on current insulin regimen (SSI, prandial, and basal insulin ). Steroid use- None . 1 Day Post-Op  Renal function- Normal   Vasopressors-  None       Endocrine will continue to follow and manage insulin orders inpatient.         Diet diabetic 2000 Calories (up to 75 gm per meal)     Eatin%  Nausea: No  Hypoglycemia and intervention: No  Fever: No  TPN and/or TF: No  If yes, type of TF/TPN and rate: n/a    /64 (BP Location: Left arm, Patient Position: Lying)   Pulse 100   Temp 98.8 °F (37.1 °C) (Oral)   Resp 18   Wt 92.5 kg (204 lb)   SpO2 96%   BMI 33.95 kg/m²     Labs Reviewed and Include    Recent Labs   Lab 10/28/24  05   *   CALCIUM 8.6*   *   K 4.5   CO2 21*      BUN 19   CREATININE 1.0     Lab Results   Component Value Date    WBC 10.37 10/28/2024    HGB 8.9 (L) 10/28/2024    HCT 27.5 (L) 10/28/2024    MCV 90 10/28/2024     10/28/2024     No results for input(s): "TSH", "FREET4" in the last 168 hours.  Lab Results   Component Value Date    HGBA1C 8.5 (H) 2024       Nutritional status:   Body mass index is 33.95 kg/m².  Lab Results   Component Value Date    ALBUMIN 2.2 (L) 10/27/2024    ALBUMIN 2.4 (L) 10/26/2024    ALBUMIN 2.7 (L) 10/25/2024     Lab Results   Component Value Date    PREALBUMIN 3 (L) 2024    PREALBUMIN 13 (L) 2024       Estimated Creatinine Clearance: 79 mL/min (based on SCr of 1 mg/dL).    Accu-Checks  Recent Labs     10/25/24  1859 10/27/24  0803 10/27/24  1127 10/27/24  1633 10/27/24  1813 10/27/24  1951 10/28/24  0752   POCTGLUCOSE 191* 143* 299* 252* 276* 324* 348*       Current Medications and/or Treatments Impacting Glycemic Control  Immunotherapy:    Immunosuppressants       None          Steroids:   Hormones (From admission, onward)      None          Pressors:    Autonomic Drugs (From " admission, onward)      None          Hyperglycemia/Diabetes Medications:   Antihyperglycemics (From admission, onward)      Start     Stop Route Frequency Ordered    10/29/24 0900  insulin glargine U-100 (Lantus) pen 20 Units         -- SubQ Daily 10/28/24 1014    10/28/24 1130  insulin aspart U-100 pen 2-6 Units         -- SubQ 3 times daily with meals 10/28/24 0719    10/28/24 1130  insulin aspart U-100 pen 6 Units         -- SubQ 3 times daily with meals 10/28/24 1014    10/27/24 1033  insulin aspart U-100 pen 0-10 Units         -- SubQ Before meals & nightly PRN 10/27/24 0934

## 2024-10-28 NOTE — NURSING TRANSFER
Nursing Transfer Note      10/28/2024   5:16 PM    Nurse giving handoff:Candice RN  Nurse receiving handoff: Fernandez Galarza LPN    Reason patient is being transferred: Insulin Drip    Transfer From: 11 IMTA    Transfer via wheelchair  Transported by Nurses  Medicines sent: Insulins   Aditional lines: wound vac  Patient belongings transferred with patient: Yes    Chart send with patient: Yes      Upon arrival to floor: patient oriented to room, call bell in reach, and bed in lowest position

## 2024-10-28 NOTE — PROGRESS NOTES
Patient Office Visit    ASSESSMENT AND PLAN:   (R00.2) Palpitations  (primary encounter diagnosis)  Plan: CARDIO - INTERNAL  Note: regular rate and rhythm currently.  Recommended to try coughing or bearing down to see if that helps with the heart rate, but [facility] - Internal Medicine [facility] Medicine  Progress Note    Patient Name: Cortes Schroeder  MRN: [id number]  Patient Class: IP- Inpatient   Admission Date: 10/25/2024  Length of Stay: 2 days  Attending Physician: Cristina Huff MD  Primary Care Provider: Andrew Sinclair Jr., MD        Subjective:     Principal Problem:Surgical wound breakdown        HPI:  Mr. Cortes Schroeder is a 64 y.o. male with previous medical history of insulin dependent type 2 diabetes mellitus with insulin pump, polyneuropathy due to type 2 diabetes mellitus, peripheral vascular disease, hyperlipidemia, And fall resulting in ORIF of right ankle in July of this year. Course parascapular free flap that did not take that ultimately resulted in getting BKA done 10/1. He was also found to have MSSA at that time. He was sent to Rehab after BKA procedure was done, with 2 week course IV oxacillin. Pt d/c from rehab on 10/20, and went to f/u w/ Plastic surgery today who noticed that he now has a non-healing wound to his back where his parascapular flap was extracted. He had also been complaining of some N/V and constipation. He was sent to the ED for admission, so he could undergo workup to r/o SBO as well as undergo debridement of his BKA and back wound with wound vac placement. At bedside, pt states that he has had 3-4 days of N/V and constipation without a BM, which resolved earlier today and he started having regular BM again. He states that he did notice some drainage from his back wound 3 days ago that was mostly clear, with some blood. However, he denied any other sx of fever, chills, chest pain, SOB, or dysuria.      In the ED, pt was afebrile HDS. CBC was done that showed WBC of 11, and CMP showed elevated glucose at 269. CT abd/pelvis was obtained to r/o SBO which was negative. Plastic Surgery team planning to see pt and do inpatient debridement procedure.    Overview/Hospital Course:  Mr. Cortes Schroeder is a 64 y.o. male  almost 45 minutes. His heart rate was in the 180s. After 45 minutes it went down and he felt back to normal. He was feeling anxious when his heart rate went up, but no chest pain or shortness of breath. His mother has a history of A fib    2.  Hypertension: with previous medical history of insulin dependent type 2 diabetes mellitus with insulin pump, polyneuropathy due to type 2 diabetes mellitus, peripheral vascular disease, hyperlipidemia, And fall resulting in ORIF of right ankle in July of this year. Course parascapular free flap that did not take that ultimately resulted in getting BKA done 10/1. He was also found to have MSSA at that time. He was sent to Rehab after BKA procedure was done, with 2 week course IV oxacillin. Pt d/c from rehab on 10/20, and went to f/u w/ Plastic surgery today who noticed that he now has a non-healing wound to his back where his parascapular flap was extracted. He had also been complaining of some N/V and constipation. He was sent to the ED for admission, so he could undergo workup to r/o SBO as well as undergo debridement of his BKA and back wound with wound vac placement. On ED admission, CT abd/pelvis ruled out bowel obstruction and pt N/V resolved. He was admitted w/ consult placed to Plastic Surgery for wound debridement, as well as consult to Endocrinology for insulin pump and glucose management. Per plastic surgery team, added on Doxy for possible infected wound to get skin coverage. Pt scheduled for debridement 10/27 w/ plastic surgery.    Interval History: NAEO. POD#1 of Wound Vac placement with Plastic Surgery. He reports doing well. BG elevated this morning. Adjusting insulin regimen per Endocrine.     Review of Systems  Objective:     Vital Signs (Most Recent):  Temp: 98.5 °F (36.9 °C) (10/28/24 1214)  Pulse: 93 (10/28/24 1214)  Resp: 17 (10/28/24 1214)  BP: (!) 152/72 (10/28/24 1214)  SpO2: 96 % (10/28/24 0750) Vital Signs (24h Range):  Temp:  [97.9 °F (36.6 °C)-99.6 °F (37.6 °C)] 98.5 °F (36.9 °C)  Pulse:  [] 93  Resp:  [13-19] 17  SpO2:  [95 %-100 %] 96 %  BP: (106-157)/(60-75) 152/72     Weight: 92.5 kg (204 lb)  Body mass index is 33.95 kg/m².    Intake/Output Summary (Last 24 hours) at 10/28/2024 3221  Last  data filed at 10/27/2024 2000  Gross per 24 hour   Intake 920 ml   Output 510 ml   Net 410 ml         Physical Exam  HENT:      Head: Atraumatic.   Eyes:      Extraocular Movements: Extraocular movements intact.      Pupils: Pupils are equal, round, and reactive to light.   Cardiovascular:      Rate and Rhythm: Normal rate and regular rhythm.      Pulses: Normal pulses.      Heart sounds: Normal heart sounds.   Pulmonary:      Effort: Pulmonary effort is normal.      Breath sounds: Normal breath sounds.   Abdominal:      General: Bowel sounds are normal.      Palpations: Abdomen is soft.   Musculoskeletal:      Comments: -Wound Vac in place. Staples still in place, and minimal clear yellow drainage present  -R BKA site w/ poor healing wound   Neurological:      Mental Status: He is alert and oriented to person, place, and time.             Significant Labs: All pertinent labs within the past 24 hours have been reviewed.  CBC:   Recent Labs   Lab 10/27/24  0543 10/28/24  0528   WBC 12.76* 10.37   HGB 10.0* 8.9*   HCT 30.6* 27.5*    267     CMP:   Recent Labs   Lab 10/27/24  0543 10/28/24  0528   * 131*   K 4.1 4.5    100   CO2 23 21*    290*   BUN 16 19   CREATININE 0.9 1.0   CALCIUM 9.0 8.6*   PROT 6.4  --    ALBUMIN 2.2*  --    BILITOT 0.3  --    ALKPHOS 108  --    AST 14  --    ALT 9*  --    ANIONGAP 10 10       Significant Imaging: I have reviewed all pertinent imaging results/findings within the past 24 hours.    Assessment/Plan:      * Surgical wound breakdown  Pt with poor wound healing of right BKA site as well as left scapular region where free flap was previously extracted. He has already completed 2 week course oxacillin for MSSA. Now has started noticing more drainage especially from left scapular wound site. Seen by Plastics today, and sent to ED for admission to undergo debridement and wound vac placement    -S/p Wound Vac Placement with Plastic Surgery (10/28/24)  - On PO  weight changes   HENT: normal sinuses and no mouth issues   Eyes: . normal vision no eye pain   Respiratory: normal respirations no cough   Cardiovascular: no CP, or palpitations   Gastrointestinal: normal bowels and no abd pains   Genitourinary:  normal u Doxycycline for MRSA skin coverage.   - ID consulted for further abx recommendations, appreciate recs  - Restarting Eliquis 2.5mg BID for DVT ppx  -Consult wound care for management once pt has debridement and wound vac placed   - Pt would like to [facility] Wound [facility] at [facility] at discharge        Uncontrolled type 2 diabetes mellitus with hyperglycemia  Patient's FSGs are uncontrolled due to hyperglycemia on current medication regimen.  Last A1c reviewed-   Lab Results   Component Value Date    LABA1C 10.8 (H) 08/15/2016    HGBA1C 8.5 (H) 09/06/2024     Most recent fingerstick glucose reviewed-   Recent Labs   Lab 10/27/24  1813 10/27/24  1951 10/28/24  0752 10/28/24  1215   POCTGLUCOSE 276* 324* 348* 397*     Current correctional scale  Medium  Increase anti-hyperglycemic dose as follows-   Antihyperglycemics (From admission, onward)      Start     Stop Route Frequency Ordered    10/28/24 1645  insulin aspart U-100 pen 8-12 Units         -- SubQ 3 times daily with meals 10/28/24 1251    10/28/24 1400  insulin regular in 0.9 % NaCl 100 unit/100 mL (1 unit/mL) infusion        Question:  Enter initial dose (Units/hr):  Answer:  0.6    -- IV Continuous 10/28/24 1251    10/28/24 1351  insulin aspart U-100 pen 0-10 Units         -- SubQ Before meals, nightly and at 0200 PRN 10/28/24 1251          Hold Oral hypoglycemics while patient is in the hospital.  Endocrine following, appreciate recs   - Plan for titrating insulin gtt for glucose optimization    Hypertension  On Losartan, Lasix, hydralazine at home and HTN well controlled. Pt recently prescribed amlodipine but has not picked it up yet    -restart Amlodipine, Lasix, Hydral while inpatient.   -Hold losartan at this time and restart if additional BP meds needed     Insulin pump status  Pt with DM2 with long term use of insulin pump. Pt has insulin pump and CGM with him.     -Consult to endocrine placed for inpatient insulin pump mgmt, appreciate recs  -POCT  glucose checks ACHS  -SSI w/ ACHS checks      Gastroesophageal reflux disease without esophagitis  Continue home PPI inpatient      Neuropathy  Controlled with gabapentin at home, continue inpatient      Anxiety  -on Duloxetine at home, restart inpatient        VTE Risk Mitigation (From admission, onward)           Ordered     apixaban tablet 2.5 mg  2 times daily         10/28/24 0714     IP VTE HIGH RISK PATIENT  Once         10/25/24 1753     Place sequential compression device  Until discontinued         10/25/24 1753                    Discharge Planning   JAYLEEN: 10/28/2024     Code Status: Full Code   Is the patient medically ready for discharge?:     Reason for patient still in hospital (select all that apply): Patient trending condition and Consult recommendations  Discharge Plan A: Home   Discharge Delays: None known at this time              Dayami Montgomery MD  Department of Hospital Medicine   [facility] - Internal Medicine Telemetry

## 2024-10-28 NOTE — NURSING
Pt BG is trending in the 290's and 300's New order place for insulin drip, however pt stated he will not do the insulin drip if his BG is not  check every 2hr. Med team 3 and charge nurse made aware. [facility]  Pt last BG is 297.   Statement Selected

## 2024-10-28 NOTE — PROGRESS NOTES
Plastic and Reconstructive Surgery   Progress Note    Subjective:    OR yesterday for wound debridement and vac placement  No issues overnight   Persistently hyperglycemic  Vac with good seal  Afebrile  Denies fevers, chills, chest pain, SOB    Objective:  Vital signs in last 24 hours:  Temp:  [97.9 °F (36.6 °C)-99.6 °F (37.6 °C)] 98.8 °F (37.1 °C)  Pulse:  [] 100  Resp:  [13-19] 18  SpO2:  [95 %-100 %] 96 %  BP: (106-157)/(60-75) 128/64    Intake/Output last 3 shifts:  I/O last 3 completed shifts:  In: 920 [P.O.:120; IV Piggyback:800]  Out: 1310 [Urine:1300; Blood:10]    Intake/Output this shift:  No intake/output data recorded.        Physical Exam:  VITAL SIGNS:   Vitals:    10/27/24 2336 10/28/24 0200 10/28/24 0552 10/28/24 0750   BP: 106/60  123/68 128/64   BP Location:   Left arm Left arm   Patient Position:   Lying Lying   Pulse: 103  99 100   Resp:   18 18   Temp: 99.2 °F (37.3 °C)  99.6 °F (37.6 °C) 98.8 °F (37.1 °C)   TempSrc: Oral  Oral Oral   SpO2: 96% 96% 95% 96%   Weight:         TMAX: Temp (24hrs), Av.6 °F (37 °C), Min:97.9 °F (36.6 °C), Max:99.6 °F (37.6 °C)    General: Alert; No acute distress  Cardiovascular: Regular rate   Respiratory: Normal respiratory effort. Chest rise symmetric.   Abdomen: Soft, nontender, nondistended  Extremity: Moves all extremities equally.  Neurologic: No focal deficit. Speech normal    Back wound vac in place with no surrounding erythema       Scheduled Medications aluminum-magnesium hydroxide-simethicone, 30 mL, QID (AC & HS)  amLODIPine, 10 mg, Daily  apixaban, 2.5 mg, BID  doxycycline, 100 mg, BID  DULoxetine, 60 mg, Daily  gabapentin, 300 mg, BID  hydrALAZINE, 50 mg, Q8H  insulin aspart U-100, 2-6 Units, TIDWM  insulin glargine U-100, 16 Units, Daily  pantoprazole, 40 mg, Daily      PRN Medications   Current Facility-Administered Medications:     acetaminophen, 650 mg, Oral, Q6H PRN    dextrose 10%, 12.5 g, Intravenous, PRN    dextrose 10%, 12.5 g,  Intravenous, PRN    dextrose 10%, 25 g, Intravenous, PRN    dextrose 10%, 25 g, Intravenous, PRN    glucagon (human recombinant), 1 mg, Intramuscular, PRN    glucagon (human recombinant), 1 mg, Intramuscular, PRN    glucose, 16 g, Oral, PRN    glucose, 24 g, Oral, PRN    insulin aspart U-100, 0-10 Units, Subcutaneous, QID (AC + HS) PRN    methocarbamoL, 500 mg, Oral, QID PRN    naloxone, 0.02 mg, Intravenous, PRN    oxyCODONE, 5 mg, Oral, Q3H PRN    prochlorperazine, 5 mg, Intravenous, Q30 Min PRN    senna-docusate 8.6-50 mg, 1 tablet, Oral, Daily PRN    sodium chloride 0.9%, 10 mL, Intravenous, Q12H PRN    traZODone, 50 mg, Oral, Nightly PRN    Recent Labs:   Lab Results   Component Value Date    WBC 10.37 10/28/2024    HGB 8.9 (L) 10/28/2024    HCT 27.5 (L) 10/28/2024    MCV 90 10/28/2024     10/28/2024     Lab Results   Component Value Date     (H) 10/28/2024     (L) 10/28/2024    K 4.5 10/28/2024     10/28/2024    BUN 19 10/28/2024         Assessment: 64 y.o. y/o male 1 Day Post-Op s/p Procedure(s):  DEBRIDEMENT, WOUND, BACK     Plan  - Wound vac and AKA stump to be managed by wound care  - Recommend aggressive glycemic control by medicine   - OK for DC when medically stable and wound care/case management has set up outpatient care of back and AKA stump    Dr. Tony Adam, PGY-6  [facility] Plastic & Reconstructive Surgery Fellow

## 2024-10-28 NOTE — SUBJECTIVE & OBJECTIVE
Past Medical History:   Diagnosis Date    Anxiety 12/18/2012    Back pain 12/18/2012    Cataract     Chronic pain syndrome 04/24/2016    Diabetes type 2, controlled 02/20/2016    Diabetic retinopathy     DM (diabetes mellitus) 12/18/2012    DM (diabetes mellitus), type 2, uncontrolled 11/16/2013    Gastroesophageal reflux disease without esophagitis 02/20/2016    Hyperlipidemia 12/18/2012    Insomnia 08/07/2014    Neuropathy 11/16/2013       Past Surgical History:   Procedure Laterality Date    ANGIOGRAPHY OF LOWER EXTREMITY Right 9/17/2024    Procedure: Angiogram Extremity Unilateral;  Surgeon: GINA Morton II, MD;  [facility];  Service: Vascular;  Laterality: Right;  Fluro time 19.5 min  mGy 260.49    ANKLE HARDWARE REMOVAL Right 9/23/2024    Procedure: REMOVAL, HARDWARE, ANKLE;  Surgeon: Moe Liz MD;  [facility];  Service: Orthopedics;  Laterality: Right;    APPLICATION OF WOUND VACUUM-ASSISTED CLOSURE DEVICE Right 9/6/2024    Procedure: APPLICATION, WOUND VAC;  Surgeon: Moe Liz MD;  [facility];  Service: Orthopedics;  Laterality: Right;    APPLICATION OF WOUND VACUUM-ASSISTED CLOSURE DEVICE  10/27/2024    Procedure: APPLICATION, WOUND VAC;  Surgeon: Juanito Cueto DO;  Location: [address];  Service: Plastics;;    APPLICATION, EXTERNAL FIXATION DEVICE, FOR ANKLE FRACTURE Right 7/18/2024    Procedure: APPLICATION, EXTERNAL FIXATION DEVICE, FOR ANKLE FRACTURE;  Surgeon: Moe Liz MD;  [address];  Service: Orthopedics;  Laterality: Right;    ARTHROTOMY OF ANKLE Right 9/9/2024    Procedure: ARTHROTOMY, ANKLE;  Surgeon: Moe Liz MD;  [facility];  Service: Orthopedics;  Laterality: Right;    BACK SURGERY  2003    Lumbar Spine    BELOW KNEE AMPUTATION OF LOWER EXTREMITY Right 10/1/2024    Procedure: AMPUTATION, BELOW KNEE - RIGHT;  Surgeon: Moe Liz MD;  Location: [facility];  Service: Orthopedics;  Laterality: Right;  with wound vac placement    CATARACT EXTRACTION      CLOSED REDUCTION, FRACTURE, ANKLE, TRIMALLEOLAR Right 7/18/2024    Procedure: CLOSED REDUCTION, FRACTURE, ANKLE, TRIMALLEOLAR;  Surgeon: Moe Liz MD;  [facility];  Service: Orthopedics;  Laterality: Right;    DEBRIDEMENT, WOUND, BACK N/A 10/27/2024    Procedure: DEBRIDEMENT, WOUND, BACK;  Surgeon: Juanito Cueto DO;  [facility];  Service: Plastics;  Laterality: N/A;  debridement of left back and right bka wounds, washout, wound vac placemenrt    EPIDURAL STEROID INJECTION N/A 1/16/2019    Procedure: Injection, Steroid, Epidural Cervical;  Surgeon: Andrew Sinclair Jr., MD;  Location: [facility];  Service: Pain Management;  Laterality: N/A;  Cervical Epidural Steroid Injection C7-T1    74034    Arrive @ 1240    EPIDURAL STEROID INJECTION N/A 2/20/2019    Procedure: Injection, Steroid, Epidural;  Surgeon: Andrew Sinclair Jr., MD;  Location: [facility];  Service: Pain Management;  Laterality: N/A;  Lumbar Epidural Steroid Injection L4-5    60788    Arrive @ 1215 (requests latest time)    FIXATION OF SYNDESMOSIS OF ANKLE Right 7/26/2024    Procedure: FIXATION, SYNDESMOSIS, ANKLE;  Surgeon: Moe Liz MD;  Location: [address];  Service: Orthopedics;  Laterality: Right;    FLAP PROCEDURE Right 9/26/2024    Procedure: CREATION, FREE FLAP;  Surgeon: Juanito Cueto DO;  [facility];  Service: Plastics;  Laterality: Right;  Spy; Def Free Flap; Micro instr., Microscope; 2 bovies, 2 teams    INJECTION, SPINE, LUMBOSACRAL, TRANSFORAMINAL APPROACH Bilateral 6/14/2024    Procedure: Bilateral L5 Transforaminal Epidural Steroid Injections;  Surgeon: Andrew Sinclair Jr., MD;  Location: [facility] PAIN MANAGEMENT;  Service: Pain Management;  Laterality: Bilateral;  @1300(given)  ASA last 6/8  Check BG  MD Sign.    IRRIGATION AND DEBRIDEMENT Right  9/6/2024    Procedure: IRRIGATION AND DEBRIDEMENT;  Surgeon: Moe Liz MD;  Location: [facility] OR [facility];  Service: Orthopedics;  Laterality: Right;    IRRIGATION AND DEBRIDEMENT Right 9/20/2024    Procedure: IRRIGATION AND DEBRIDEMENT;  Surgeon: Moe Liz MD;  [facility];  Service: Orthopedics;  Laterality: Right;    IRRIGATION AND DEBRIDEMENT OF LOWER EXTREMITY Right 9/9/2024    Procedure: IRRIGATION AND DEBRIDEMENT, LOWER EXTREMITY - WITH WOUND VAC CHANGE, RIGHT ANKLE;  Surgeon: Moe Liz MD;  [facility];  Service: Orthopedics;  Laterality: Right;  WITH WOUND VAC CHANGE, RIGHT ANKLE    OPEN REDUCTION AND INTERNAL FIXATION (ORIF) OF FRACTURE OF DISTAL RADIUS Left 7/19/2024    Procedure: ORIF, FRACTURE, RADIUS, DISTAL - LEFT;  Surgeon: Moe Liz MD;  [facility];  Service: Orthopedics;  Laterality: Left;  LEFT, SYNTHES, C ARM    OPEN REDUCTION AND INTERNAL FIXATION (ORIF) OF INJURY OF ANKLE Right 7/26/2024    Procedure: ORIF, ANKLE;  Surgeon: Moe Liz MD;  [facility];  Service: Orthopedics;  Laterality: Right;    REMOVAL OF EXTERNAL FIXATION DEVICE Right 7/26/2024    Procedure: REMOVAL, EXTERNAL FIXATION DEVICE;  Surgeon: Moe Liz MD;  [facility];  Service: Orthopedics;  Laterality: Right;    REPLACEMENT OF WOUND VACUUM-ASSISTED CLOSURE DEVICE Right 9/17/2024    Procedure: REPLACEMENT, WOUND VAC;  Surgeon: Moe Liz MD;  [facility];  Service: Orthopedics;  Laterality: Right;  wound vac dressing exchange    REPLACEMENT OF WOUND VACUUM-ASSISTED CLOSURE DEVICE Right 9/20/2024    Procedure: REPLACEMENT, WOUND VAC;  Surgeon: Moe Liz MD;  [facility];  Service: Orthopedics;  Laterality: Right;    REPLACEMENT OF WOUND VACUUM-ASSISTED CLOSURE DEVICE Right 9/23/2024    Procedure: REPLACEMENT, WOUND VAC; white & black sponge;  Surgeon: Moe Liz  MD BRADY;  Location: [facility] OR [address] [place] [address];  Service: Orthopedics;  Laterality: Right;       Review of patient's allergies indicates:   Allergen Reactions    Invokana [canagliflozin] Anaphylaxis    Percocet [oxycodone-acetaminophen] Nausea Only and Hallucinations    Biaxin [clarithromycin]     Hydrocodone Other (See Comments)     Dizzy/nausea/hallucinations    Sulfa (sulfonamide antibiotics) Nausea Only and Rash       Medications:  Medications Prior to Admission   Medication Sig    amLODIPine (NORVASC) 10 MG tablet Take 1 tablet (10 mg total) by mouth once daily.    apixaban (ELIQUIS) 2.5 mg Tab Take 1 tablet (2.5 mg total) by mouth 2 (two) times daily.    0.9% NaCl SolP 500 mL with oxacillin 1 gram SolR 12 g Inject 12 g into the vein once daily. End date 10/20/2024.    acetaminophen (TYLENOL) 500 MG tablet Take 2 tablets (1,000 mg total) by mouth every 8 (eight) hours.    aspirin (ECOTRIN) 81 MG EC tablet Take 1 tablet (81 mg total) by mouth once daily.    atorvastatin (LIPITOR) 40 MG tablet Take 40 mg by mouth once daily.    blood-glucose sensor (DEXCOM G6 SENSOR) [place]ira Change sensor every 10 days    blood-glucose transmitter (DEXCOM G6 TRANSMITTER) Omaira Change transmitter every 3 months    calcium carbonate (TUMS) 200 mg calcium (500 mg) chewable tablet Take 2 tablets (1,000 mg total) by mouth 3 (three) times daily as needed for Heartburn.    celecoxib (CELEBREX) 200 MG capsule Take 1 capsule (200 mg total) by mouth once daily.    cyanocobalamin (VITAMIN B-12) 1000 MCG tablet Take 1,000 mcg by mouth once daily.    DULoxetine (CYMBALTA) 60 MG capsule Take 1 capsule (60 mg total) by mouth once daily.    emtricitabine-tenofovir 200-300 mg (TRUVADA) 200-300 mg Tab Take 1 tablet by mouth once daily.    fish oil-omega-3 fatty acids 300-1,000 mg capsule Take 1 capsule by mouth 2 (two) times daily.    furosemide (LASIX) 40 MG tablet Take 1 tablet (40 mg total) by mouth once daily.    gabapentin (NEURONTIN) 300 MG capsule Take 1  capsule (300 mg total) by mouth 2 (two) times daily.    HUMALOG U-100 INSULIN 100 unit/mL injection 1:20 U PRN high blood sugar and snacks. Correction dose- Enter carb coverage intake upon administration  Target number 120 Carbohydrate coverage #1 1:2 Carbohydrate coverage #1 time [id number] Carbohydrate coverage #2 1:3 Carbohydrate coverage #2 time [id number] Carbohydrate coverage #3 Carbohydrate coverage #3 time Carbohydrate coverage #4 Carbohydrate coverage #4 time Sensitivity #1 1:20 Sensitivity #1 time [id number] Sensitivity #2 Sensitivity #2 time Sensitivity #3 Sensitivity #3 time Sensitivity #4 Sensitivity #4 time Sensitivity #5 Sensitivity #5 time Order Questions Question Answer Comment       50 U SQ continuous  Target number 120 Basal Rate #1 2.3 Basal rate #1 time [id number] Basal Rate #2 1.55 Basal rate #2 time 7739-0737 Basal rate #3 Basal rate #3 time Basal rate #4 Basal rate #4 time Basal rate #5 Basal rate #5 time    hydrALAZINE (APRESOLINE) 50 MG tablet Take 1 tablet (50 mg total) by mouth every 8 (eight) hours.    losartan (COZAAR) 25 MG tablet Take 2 tablets (50 mg total) by mouth once daily.    magnesium oxide (MAG-OX) 400 mg (241.3 mg magnesium) tablet Take 0.5 tablets (200 mg total) by mouth once daily.    melatonin (MELATIN) 3 mg tablet Take 2 tablets (6 mg total) by mouth nightly as needed for Insomnia.    methocarbamoL (ROBAXIN) 500 MG Tab Take 1 tablet (500 mg total) by mouth every 8 (eight) hours.    morphine (MSIR) 15 MG tablet Take 1 tablet (15 mg total) by mouth every 4 (four) hours as needed (Severe pain 7-10/10).    MOUNJARO 10 mg/0.5 mL PnIj Inject 10 mg into the skin once a week. HOLD until all surgeries completed and out of rehab    nortriptyline (PAMELOR) 50 MG capsule Take 1 capsule (50 mg total) by mouth nightly.    ondansetron (ZOFRAN-ODT) 4 MG TbDL Take 2 tablets (8 mg total) by mouth every 6 (six) hours as needed (nausea).    pantoprazole (PROTONIX) 40 MG tablet Take 1 tablet (40  mg total) by mouth once daily.    polyethylene glycol (GLYCOLAX) 17 gram PwPk Take 17 g by mouth once daily.    senna (SENOKOT) 8.6 mg tablet Take 1 tablet by mouth 2 (two) times a day.    senna-docusate 8.6-50 mg (PERICOLACE) 8.6-50 mg per tablet Take 1 tablet by mouth 2 (two) times daily.    sucralfate (CARAFATE) 100 mg/mL suspension Take 10 mLs (1 g total) by mouth every 6 (six) hours.    traMADoL (ULTRAM) 50 mg tablet Take 1 tablet (50 mg total) by mouth every 6 (six) hours as needed (Moderate pain 4-6/10).    traZODone (DESYREL) 100 MG tablet Take 1 tablet (100 mg total) by mouth every evening.    vitamin D (VITAMIN D3) 1000 units Tab Take 1 tablet (1,000 Units total) by mouth once daily.     Antibiotics (From admission, onward)      Start     Stop Route Frequency Ordered    10/28/24 2100  amoxicillin-clavulanate 875-125mg per tablet 1 tablet         11/07/24 2059 Oral Every 12 hours 10/28/24 1623    10/26/24 2100  doxycycline tablet 100 mg         10/31/24 2059 Oral 2 times daily 10/26/24 1424          Antifungals (From admission, onward)      None          Antivirals (From admission, onward)      None             Immunization History   Administered Date(s) Administered    COVID-19, MRNA, LN-S, PF (MODERNA FULL 0.5 ML DOSE) 03/08/2021, 04/05/2021    Influenza 10/25/2021    Influenza - Quadrivalent 11/20/2014, 09/11/2015    Influenza - Quadrivalent - MDCK - PF 10/31/2022    Influenza - Quadrivalent - PF *Preferred* (6 months and older) 10/02/2017, 10/15/2018, 09/30/2019, 12/11/2020, 10/23/2021, 09/29/2023    Influenza - Trivalent - Afluria, Fluzone MDV 12/18/2012, 10/29/2013    Influenza Split 12/18/2012, 12/18/2012, 10/29/2013, 10/29/2013    Pneumococcal Conjugate - 20 Valent 11/09/2023    Pneumococcal Polysaccharide - 23 Valent 03/03/2017    Rsv, Bivalent, Rsvpref (Abrysvo) 11/09/2023    Tdap 03/16/2022    Vaccinia, smallpox monkeypox vaccine live, PF 08/15/2022, 09/12/2022    Zoster Recombinant 01/20/2022,  03/25/2022       Family History       Problem Relation (Age of Onset)    Alzheimer's disease Father    Cataracts Father    Heart attack Mother    Hypertension Father, Mother    Migraines Mother    Parkinsonism Father          Social History     Socioeconomic History    Marital status:    Tobacco Use    Smoking status: Never    Smokeless tobacco: Never   Substance and Sexual Activity    Alcohol use: Yes     Alcohol/week: 1.0 standard drink of alcohol     Types: 1 Glasses of wine per week     Comment: occasionally    Drug use: No    Sexual activity: Not Currently     Partners: Male     Birth control/protection: Condom     Comment: 10/2/17      Social Drivers of Health     Financial Resource Strain: High Risk (10/7/2024)    Received from [facility]    Overall Financial Resource Strain (CARDIA)     Difficulty of Paying Living Expenses: Very hard   Food Insecurity: No Food Insecurity (10/7/2024)    Received from [facility]    Hunger Vital Sign     Worried About Running Out of Food in the Last Year: Never true     Ran Out of Food in the Last Year: Never true   Recent Concern: Food Insecurity - Food Insecurity Present (7/22/2024)    Hunger Vital Sign     Worried About Running Out of Food in the Last Year: Sometimes true     Ran Out of Food in the Last Year: Sometimes true   Transportation Needs: No Transportation Needs (10/21/2024)    Received from [facility] Transportation Source     Has lack of transportation kept you from medical appointments or from getting medications?: No     Has lack of transportation kept you from meetings, work, or from getting things needed for daily living?: No   Physical Activity: Inactive (9/8/2024)    Exercise Vital Sign     Days of Exercise per Week: 0 days     Minutes of Exercise per Session: 0 min   Stress: No Stress Concern Present (10/4/2024)    Received from [facility] - Occupational Stress Questionnaire      Feeling of Stress : Only a little   Recent Concern: Stress - Stress Concern Present (9/8/2024)    [employer] [place] [facility]ational [facility] - Occupational Stress Questionnaire     Feeling of Stress : To some extent   Housing Stability: Low Risk  (10/7/2024)    Received from [facility]    Housing Stability Vital Sign     Unable to Pay for Housing in the Last Year: No     Number of Times Moved in the Last Year: 1     Homeless in the Last Year: No     Review of Systems   Gastrointestinal:  Positive for constipation.   Skin:  Positive for wound.   All other systems reviewed and are negative.    Objective:     Vital Signs (Most Recent):  Temp: 98.3 °F (36.8 °C) (10/28/24 1708)  Pulse: 97 (10/28/24 1708)  Resp: 17 (10/28/24 1708)  BP: (!) 153/70 (10/28/24 1708)  SpO2: 96 % (10/28/24 1708) Vital Signs (24h Range):  Temp:  [98.2 °F (36.8 °C)-99.6 °F (37.6 °C)] 98.3 °F (36.8 °C)  Pulse:  [] 97  Resp:  [17-18] 17  SpO2:  [95 %-96 %] 96 %  BP: (106-153)/(60-72) 153/70     Weight: 92.5 kg (204 lb)  Body mass index is 33.95 kg/m².    Estimated Creatinine Clearance: 79 mL/min (based on SCr of 1 mg/dL).     Physical Exam  Vitals and nursing note reviewed.   Constitutional:       General: He is not in acute distress.     Appearance: Normal appearance. He is not ill-appearing or toxic-appearing.   HENT:      Mouth/Throat:      Pharynx: No oropharyngeal exudate.   Eyes:      General: No scleral icterus.     Conjunctiva/sclera: Conjunctivae normal.   Cardiovascular:      Rate and Rhythm: Normal rate.      Heart sounds: Normal heart sounds. No murmur heard.  Pulmonary:      Effort: No respiratory distress.      Breath sounds: Normal breath sounds.   Abdominal:      General: Bowel sounds are normal. There is no distension.      Palpations: Abdomen is soft.   Musculoskeletal:         General: No swelling or tenderness.      Cervical back: Neck supple. No tenderness.   Skin:     Findings: Erythema present.   Neurological:       Mental Status: He is alert and oriented to person, place, and time. Mental status is at baseline.   Psychiatric:         Behavior: Behavior normal.         Thought Content: Thought content normal.          Significant Labs: Blood Culture:   Recent Labs   Lab 09/06/24  0235 09/07/24  0910 09/08/24  1641 09/10/24  1711   LABBLOO Gram stain aer bottle: Gram positive cocci in clusters resembling Staph  Gram stain alicia bottle: Gram positive cocci in clusters resembling Staph  Results called to and read back by:Luciana Altamirano RN 09/06/2024  20:52  STAPHYLOCOCCUS AUREUS*  Gram stain aer bottle: Gram positive cocci in clusters resembling Staph  Gram stain alicia bottle: Gram positive cocci in clusters resembling Staph  Results called to and read back by:Luciana Altamirano RN 09/06/2024  20:55  STAPHYLOCOCCUS AUREUS  For susceptibility see order #[id number]  * Gram stain aer bottle: Gram positive cocci in clusters resembling Staph  Results called to and read back by: Roseanne Guevara RN 09/08/2024  14:07  Gram stain alicia bottle: Gram positive cocci in clusters resembling Staph  Positive results previously called 09/08/2024  STAPHYLOCOCCUS AUREUS  ID consult required at [facility].  For susceptibility see order #[id number]  *  Gram stain aer bottle: Gram positive cocci in clusters resembling Staph  Gram stain alicia bottle: Gram positive cocci in clusters resembling Staph  Results called to and read back by:Luciana Altamirano LPN 09/08/2024  03:59  STAPHYLOCOCCUS AUREUS  ID consult required at [facility].  For susceptibility see order #[id number]  * No growth after 5 days.  No growth after 5 days. No growth after 5 days.     CBC:   Recent Labs   Lab 10/27/24  0543 10/28/24  0528   WBC 12.76* 10.37   HGB 10.0* 8.9*   HCT 30.6* 27.5*    267     CMP:   Recent Labs   Lab 10/27/24  0543 10/28/24  0528   * 131*   K 4.1 4.5    100   CO2 23  21*    290*   BUN 16 19   CREATININE 0.9 1.0   CALCIUM 9.0 8.6*   PROT 6.4  --    ALBUMIN 2.2*  --    BILITOT 0.3  --    ALKPHOS 108  --    AST 14  --    ALT 9*  --    ANIONGAP 10 10     Microbiology Results (last 7 days)       ** No results found for the last 168 hours. **          Wound Culture:   Recent Labs   Lab 09/06/24  1446   LABAERO STAPHYLOCOCCUS AUREUS  Many  *  STREPTOCOCCUS AGALACTIAE (GROUP B)  Many  Beta-hemolytic streptococci are routinely susceptible to   penicillins,cephalosporins and carbapenems.  *     All pertinent labs within the past 24 hours have been reviewed.    Significant Imaging: I have reviewed all pertinent imaging results/findings within the past 24 hours.    I have personally reviewed records / hospital notes from   service and other specialty providers. I have also reviewed CBC, CMP/BMP,  cultures and imaging with my interpretation as documented in my assessment / plan.    Patient is high risk for infectious complications given pt's age, multiple co-morbidities, and case complexity.      Time: 75 minutes   50% of time spent on face-to-face counseling and coordination of care. Counseling included review of test results, diagnosis, and treatment plan with patient and/or family.

## 2024-10-28 NOTE — ASSESSMENT & PLAN NOTE
I have reviewed hospital notes from  HM service and other specialty providers. I have also reviewed CBC, CMP/BMP,  cultures and imaging with my interpretation as documented.      64M with DM2 (a1c 8.5, improved from 9.9), s/p ORIF of right ankle, c/b dehiscence and deep surgical site infection (cxs+MSSA, and +GBS) of right ankle involving underlying hardware with associated MSSA bacteremia, s/p para-scapular flap for closure of Rt LE wound, but ultimately required RT BKA d/t poor healing despite angio. Pt completed 6wks IV-daptomycin for mssa bacteremia, as ROBERTA deferred, ended 10/20. -- Pt now re-admitted for N/V during plastics clinic appt. Ct-a/p neg for SBO.     Pt noted to have new full thickness necrosis of para-scapular incision site, with scant clear drainage. Rt BKA noted to have some scant drainage with mild maceration, and scabbing along incision line. Mild erythema along incision lines of RT BKA and Rt upper back, w/o TTP or flunctuance.     Plastics took pt to OR 10/27 for I&D of full thickness necrosis of para-scapular donor flap site, with wound vac placed at upper back. No cxs obtained this admission. Pt started on empiric doxy 10/26, and ID consulted for further abx recs. Pt remains AF, VSS, wbc nml. Pt N/V resolved, endorses constipation, BM yesterday, tolerating oral meds w/o issue.     Recommendations / Plan:  Continue empiric oral doxy with oral augmentin for 10d, DON 11/08 -- for suspect SSTI of Rt upper back incision site.  Continue wound vac and wound care outpt.       -- ID will schedule pt for ID clinic f/u appt   -- Discussed with ID staff and primary team.  -- ID will sign off at this time.

## 2024-10-28 NOTE — PLAN OF CARE
Problem: Skin Injury Risk Increased  Goal: Skin Health and Integrity  Outcome: Progressing     Problem: Adult Inpatient Plan of Care  Goal: Plan of Care Review  Outcome: Progressing  Goal: Patient-Specific Goal (Individualized)  Outcome: Progressing  Goal: Absence of Hospital-Acquired Illness or Injury  Outcome: Progressing  Goal: Optimal Comfort and Wellbeing  Outcome: Progressing  Goal: Readiness for Transition of Care  Outcome: Progressing     Problem: Diabetes Comorbidity  Goal: Blood Glucose Level Within Targeted Range  Outcome: Progressing     Problem: Wound  Goal: Optimal Coping  Outcome: Progressing  Goal: Optimal Functional Ability  Outcome: Progressing  Goal: Absence of Infection Signs and Symptoms  Outcome: Progressing  Goal: Improved Oral Intake  Outcome: Progressing  Goal: Optimal Pain Control and Function  Outcome: Progressing  Goal: Skin Health and Integrity  Outcome: Progressing  Goal: Optimal Wound Healing  Outcome: Progressing     Problem: Fall Injury Risk  Goal: Absence of Fall and Fall-Related Injury  Outcome: Progressing    Pt had an uneventful night. VSS. Pt given prn pain medications. Wound vac remains in place to back wound at 125 mmHg.  Pt is free of falls and injuries. Bed in lowest position and call light within reach. Safety maintained

## 2024-10-28 NOTE — HPI
64M with DM2 (a1c 8.5, improved from 9.9), s/p ORIF of right ankle, c/b dehiscence and deep surgical site infection (cxs+MSSA, and +GBS) of right ankle involving underlying hardware with associated MSSA bacteremia, Blood cultures 9/6 and 9/7 + for MSSA. Blood cultures cleared 9/8. TTE negative. On 09/17 underwent angiogram with PTA, with removal of all hardware 09/23. For closure, plastics attempted parascapular flap 09/26 but c/b pood healing, and utimately required Rt BKA 10/01. Still, d/t MSSA bacteremia,a nd ROBERTA deferred then, favored 6wks IV-daptomycin, ended 10/20. Pt seen by ID 10/22, doing well at that time. But later seen by plastics 10/25, noted to have new necrosis of para-scapular incision site that was used for prior RLE flap, with scant clear drainage. Rt BKA noted to have some drainage and maceration, with some slight drainage and scabbing along incision line. Mild erythema along incision lines of RT BKA and Rt upper back, w/o TTP or flunctuance.     Pt referred to ED from palstics clinic d/t N/V in exam room. CT-a/p negative for SBO.   Plastics took pt to OR 10/27 for I&D of full thickness necrosis of para-scapular donor flap site, with wound vac placed at upper back. No cxs obtained this admission. Pt started on empiric doxy 10/26, and ID consulted for further abx recs. Pt remains AF, VSS, wbc nml.

## 2024-10-28 NOTE — CONSULTS
[facility] - Stepdown Flex ([place])  Infectious Disease  Consult Note    Patient Name: Cortes Schroeder  MRN: [id number]  Admission Date: 10/25/2024  Hospital Length of Stay: 2 days  Attending Physician: Cristina Huff MD  Primary Care Provider: Andrew Sinclair Jr., MD     Isolation Status: No active isolations    Patient information was obtained from patient and ER records.      Inpatient consult to Infectious Diseases  Consult performed by: Andrew Kearns PA-C  Consult ordered by: Dayami Ruiz MD        Assessment/Plan:     ID  Surgical site infection  I have reviewed hospital notes from  HM service and other specialty providers. I have also reviewed CBC, CMP/BMP,  cultures and imaging with my interpretation as documented.      64M with DM2 (a1c 8.5, improved from 9.9), s/p ORIF of right ankle, c/b dehiscence and deep surgical site infection (cxs+MSSA, and +GBS) of right ankle involving underlying hardware with associated MSSA bacteremia, s/p para-scapular flap for closure of Rt LE wound, but ultimately required RT BKA d/t poor healing despite angio. Pt completed 6wks IV-daptomycin for mssa bacteremia, as ROBERTA deferred, ended 10/20. -- Pt now re-admitted for N/V during plastics clinic appt. Ct-a/p neg for SBO.     Pt noted to have new full thickness necrosis of para-scapular incision site, with scant clear drainage. Rt BKA noted to have some scant drainage with mild maceration, and scabbing along incision line. Mild erythema along incision lines of RT BKA and Rt upper back, w/o TTP or flunctuance.     Plastics took pt to OR 10/27 for I&D of full thickness necrosis of para-scapular donor flap site, with wound vac placed at upper back. No cxs obtained this admission. Pt started on empiric doxy 10/26, and ID consulted for further abx recs. Pt remains AF, VSS, wbc nml. Pt N/V resolved, endorses constipation, BM yesterday, tolerating oral meds w/o issue.     Recommendations / Plan:  Continue empiric oral  doxy 100mg BID with oral augmentin 875mg BID for 10d, DON 11/08 -- for suspect SSTI of Rt upper back incision site.  Continue wound vac and wound care outpt of upper back and Rt BKA incisions.       -- ID will schedule pt for ID clinic f/u appt   -- Discussed with ID staff and primary team.  -- ID will sign off at this time.           Thank you for your consult. I will sign off. Please contact us if you have any additional questions.    Andrew Kearns PA-C  Infectious Disease  [facility] ([place])    Subjective:     Principal Problem: Surgical wound breakdown    HPI: 64M with DM2 (a1c 8.5, improved from 9.9), s/p ORIF of right ankle, c/b dehiscence and deep surgical site infection (cxs+MSSA, and +GBS) of right ankle involving underlying hardware with associated MSSA bacteremia, Blood cultures 9/6 and 9/7 + for MSSA. Blood cultures cleared 9/8. TTE negative. On 09/17 underwent angiogram with PTA, with removal of all hardware 09/23. For closure, plastics attempted parascapular flap 09/26 but c/b pood healing, and utimately required Rt BKA 10/01. Still, d/t MSSA bacteremia,a nd ROBERTA deferred then, favored 6wks IV-daptomycin, ended 10/20. Pt seen by ID 10/22, doing well at that time. But later seen by plastics 10/25, noted to have new necrosis of para-scapular incision site that was used for prior RLE flap, with scant clear drainage. Rt BKA noted to have some drainage and maceration, with some slight drainage and scabbing along incision line. Mild erythema along incision lines of RT BKA and Rt upper back, w/o TTP or flunctuance.     Pt referred to ED from palstics clinic d/t N/V in exam room. CT-a/p negative for SBO.   Plastics took pt to OR 10/27 for I&D of full thickness necrosis of para-scapular donor flap site, with wound vac placed at upper back. No cxs obtained this admission. Pt started on empiric doxy 10/26, and ID consulted for further abx recs. Pt remains AF, VSS, wbc nml.           Past  Medical History:   Diagnosis Date    Anxiety 12/18/2012    Back pain 12/18/2012    Cataract     Chronic pain syndrome 04/24/2016    Diabetes type 2, controlled 02/20/2016    Diabetic retinopathy     DM (diabetes mellitus) 12/18/2012    DM (diabetes mellitus), type 2, uncontrolled 11/16/2013    Gastroesophageal reflux disease without esophagitis 02/20/2016    Hyperlipidemia 12/18/2012    Insomnia 08/07/2014    Neuropathy 11/16/2013       Past Surgical History:   Procedure Laterality Date    ANGIOGRAPHY OF LOWER EXTREMITY Right 9/17/2024    Procedure: Angiogram Extremity Unilateral;  Surgeon: GINA Morton II, MD;  Location: [facility] OR [address] [place] [address];  Service: Vascular;  Laterality: Right;  Fluro time 19.5 min  mGy 260.49    ANKLE HARDWARE REMOVAL Right 9/23/2024    Procedure: REMOVAL, HARDWARE, ANKLE;  Surgeon: Moe Liz MD;  [facility];  Service: Orthopedics;  Laterality: Right;    APPLICATION OF WOUND VACUUM-ASSISTED CLOSURE DEVICE Right 9/6/2024    Procedure: APPLICATION, WOUND VAC;  Surgeon: Moe Liz MD;  [facility];  Service: Orthopedics;  Laterality: Right;    APPLICATION OF WOUND VACUUM-ASSISTED CLOSURE DEVICE  10/27/2024    Procedure: APPLICATION, WOUND VAC;  Surgeon: Juanito Cueto DO;  Location: [facility] OR [address] [place] [address];  Service: Plastics;;    APPLICATION, EXTERNAL FIXATION DEVICE, FOR ANKLE FRACTURE Right 7/18/2024    Procedure: APPLICATION, EXTERNAL FIXATION DEVICE, FOR ANKLE FRACTURE;  Surgeon: Moe Liz MD;  Location: [facility] OR [address] [place] [address];  Service: Orthopedics;  Laterality: Right;    ARTHROTOMY OF ANKLE Right 9/9/2024    Procedure: ARTHROTOMY, ANKLE;  Surgeon: Moe Liz MD;  [facility];  Service: Orthopedics;  Laterality: Right;    BACK SURGERY  2003    Lumbar Spine    BELOW KNEE AMPUTATION OF LOWER EXTREMITY Right 10/1/2024    Procedure: AMPUTATION, BELOW KNEE - RIGHT;  Surgeon: Moe Liz MD;  Location: [facility] OR [address] [place] [address];   Service: Orthopedics;  Laterality: Right;  with wound vac placement    CATARACT EXTRACTION      CLOSED REDUCTION, FRACTURE, ANKLE, TRIMALLEOLAR Right 7/18/2024    Procedure: CLOSED REDUCTION, FRACTURE, ANKLE, TRIMALLEOLAR;  Surgeon: Moe Liz MD;  [facility];  Service: Orthopedics;  Laterality: Right;    DEBRIDEMENT, WOUND, BACK N/A 10/27/2024    Procedure: DEBRIDEMENT, WOUND, BACK;  Surgeon: Juanito Cueto DO;  [facility];  Service: Plastics;  Laterality: N/A;  debridement of left back and right bka wounds, washout, wound vac placemenrt    EPIDURAL STEROID INJECTION N/A 1/16/2019    Procedure: Injection, Steroid, Epidural Cervical;  Surgeon: Andrew Sinclair Jr., MD;  Location: [facility] ENDO;  Service: Pain Management;  Laterality: N/A;  Cervical Epidural Steroid Injection C7-T1    76555    Arrive @ 1240    EPIDURAL STEROID INJECTION N/A 2/20/2019    Procedure: Injection, Steroid, Epidural;  Surgeon: Andrew Sinclair Jr., MD;  Location: [facility] END[facility];  Service: Pain Management;  Laterality: N/A;  Lumbar Epidural Steroid Injection L4-5    29594    Arrive @ 1215 (requests latest time)    FIXATION OF SYNDESMOSIS OF ANKLE Right 7/26/2024    Procedure: FIXATION, SYNDESMOSIS, ANKLE;  Surgeon: Moe Liz MD;  Location: [address];  Service: Orthopedics;  Laterality: Right;    FLAP PROCEDURE Right 9/26/2024    Procedure: CREATION, FREE FLAP;  Surgeon: Juanito Cueto DO;  Location: [facility] OR [address] [place] [address];  Service: Plastics;  Laterality: Right;  Spy; Def Free Flap; Micro instr., Microscope; 2 bovies, 2 teams    INJECTION, SPINE, LUMBOSACRAL, TRANSFORAMINAL APPROACH Bilateral 6/14/2024    Procedure: Bilateral L5 Transforaminal Epidural Steroid Injections;  Surgeon: Andrew Sinclair Jr., MD;  Location: [facility] PAIN MANAGEMENT;  Service: Pain Management;  Laterality: Bilateral;  @1300(given)  ASA last 6/8  Check BG  MD Sign.    IRRIGATION AND DEBRIDEMENT Right  9/6/2024    Procedure: IRRIGATION AND DEBRIDEMENT;  Surgeon: Moe Liz MD;  Location: [facility] OR [facility];  Service: Orthopedics;  Laterality: Right;    IRRIGATION AND DEBRIDEMENT Right 9/20/2024    Procedure: IRRIGATION AND DEBRIDEMENT;  Surgeon: Moe Liz MD;  [facility];  Service: Orthopedics;  Laterality: Right;    IRRIGATION AND DEBRIDEMENT OF LOWER EXTREMITY Right 9/9/2024    Procedure: IRRIGATION AND DEBRIDEMENT, LOWER EXTREMITY - WITH WOUND VAC CHANGE, RIGHT ANKLE;  Surgeon: Moe Liz MD;  [facility];  Service: Orthopedics;  Laterality: Right;  WITH WOUND VAC CHANGE, RIGHT ANKLE    OPEN REDUCTION AND INTERNAL FIXATION (ORIF) OF FRACTURE OF DISTAL RADIUS Left 7/19/2024    Procedure: ORIF, FRACTURE, RADIUS, DISTAL - LEFT;  Surgeon: Moe Liz MD;  [facility];  Service: Orthopedics;  Laterality: Left;  LEFT, SYNTHES, C ARM    OPEN REDUCTION AND INTERNAL FIXATION (ORIF) OF INJURY OF ANKLE Right 7/26/2024    Procedure: ORIF, ANKLE;  Surgeon: Moe Liz MD;  [facility];  Service: Orthopedics;  Laterality: Right;    REMOVAL OF EXTERNAL FIXATION DEVICE Right 7/26/2024    Procedure: REMOVAL, EXTERNAL FIXATION DEVICE;  Surgeon: Moe Liz MD;  [facility];  Service: Orthopedics;  Laterality: Right;    REPLACEMENT OF WOUND VACUUM-ASSISTED CLOSURE DEVICE Right 9/17/2024    Procedure: REPLACEMENT, WOUND VAC;  Surgeon: Moe Liz MD;  [facility];  Service: Orthopedics;  Laterality: Right;  wound vac dressing exchange    REPLACEMENT OF WOUND VACUUM-ASSISTED CLOSURE DEVICE Right 9/20/2024    Procedure: REPLACEMENT, WOUND VAC;  Surgeon: Moe Liz MD;  [facility];  Service: Orthopedics;  Laterality: Right;    REPLACEMENT OF WOUND VACUUM-ASSISTED CLOSURE DEVICE Right 9/23/2024    Procedure: REPLACEMENT, WOUND VAC; white & black sponge;  Surgeon: Moe Liz  MD BRADY;  Location: [facility] OR [address] [place] [address];  Service: Orthopedics;  Laterality: Right;       Review of patient's allergies indicates:   Allergen Reactions    Invokana [canagliflozin] Anaphylaxis    Percocet [oxycodone-acetaminophen] Nausea Only and Hallucinations    Biaxin [clarithromycin]     Hydrocodone Other (See Comments)     Dizzy/nausea/hallucinations    Sulfa (sulfonamide antibiotics) Nausea Only and Rash       Medications:  Medications Prior to Admission   Medication Sig    amLODIPine (NORVASC) 10 MG tablet Take 1 tablet (10 mg total) by mouth once daily.    apixaban (ELIQUIS) 2.5 mg Tab Take 1 tablet (2.5 mg total) by mouth 2 (two) times daily.    0.9% NaCl SolP 500 mL with oxacillin 1 gram SolR 12 g Inject 12 g into the vein once daily. End date 10/20/2024.    acetaminophen (TYLENOL) 500 MG tablet Take 2 tablets (1,000 mg total) by mouth every 8 (eight) hours.    aspirin (ECOTRIN) 81 MG EC tablet Take 1 tablet (81 mg total) by mouth once daily.    atorvastatin (LIPITOR) 40 MG tablet Take 40 mg by mouth once daily.    blood-glucose sensor (DEXCOM G6 SENSOR) [place] Change sensor every 10 days    blood-glucose transmitter (DEXCOM G6 TRANSMITTER) [place]ira Change transmitter every 3 months    calcium carbonate (TUMS) 200 mg calcium (500 mg) chewable tablet Take 2 tablets (1,000 mg total) by mouth 3 (three) times daily as needed for Heartburn.    celecoxib (CELEBREX) 200 MG capsule Take 1 capsule (200 mg total) by mouth once daily.    cyanocobalamin (VITAMIN B-12) 1000 MCG tablet Take 1,000 mcg by mouth once daily.    DULoxetine (CYMBALTA) 60 MG capsule Take 1 capsule (60 mg total) by mouth once daily.    emtricitabine-tenofovir 200-300 mg (TRUVADA) 200-300 mg Tab Take 1 tablet by mouth once daily.    fish oil-omega-3 fatty acids 300-1,000 mg capsule Take 1 capsule by mouth 2 (two) times daily.    furosemide (LASIX) 40 MG tablet Take 1 tablet (40 mg total) by mouth once daily.    gabapentin (NEURONTIN) 300 MG capsule Take 1  capsule (300 mg total) by mouth 2 (two) times daily.    HUMALOG U-100 INSULIN 100 unit/mL injection 1:20 U PRN high blood sugar and snacks. Correction dose- Enter carb coverage intake upon administration  Target number 120 Carbohydrate coverage #1 1:2 Carbohydrate coverage #1 time [id number] Carbohydrate coverage #2 1:3 Carbohydrate coverage #2 time [id number] Carbohydrate coverage #3 Carbohydrate coverage #3 time Carbohydrate coverage #4 Carbohydrate coverage #4 time Sensitivity #1 1:20 Sensitivity #1 time 2976-1082 Sensitivity #2 Sensitivity #2 time Sensitivity #3 Sensitivity #3 time Sensitivity #4 Sensitivity #4 time Sensitivity #5 Sensitivity #5 time Order Questions Question Answer Comment       50 U SQ continuous  Target number 120 Basal Rate #1 2.3 Basal rate #1 time [id number] Basal Rate #2 1.55 Basal rate #2 time 7630-0309 Basal rate #3 Basal rate #3 time Basal rate #4 Basal rate #4 time Basal rate #5 Basal rate #5 time    hydrALAZINE (APRESOLINE) 50 MG tablet Take 1 tablet (50 mg total) by mouth every 8 (eight) hours.    losartan (COZAAR) 25 MG tablet Take 2 tablets (50 mg total) by mouth once daily.    magnesium oxide (MAG-OX) 400 mg (241.3 mg magnesium) tablet Take 0.5 tablets (200 mg total) by mouth once daily.    melatonin (MELATIN) 3 mg tablet Take 2 tablets (6 mg total) by mouth nightly as needed for Insomnia.    methocarbamoL (ROBAXIN) 500 MG Tab Take 1 tablet (500 mg total) by mouth every 8 (eight) hours.    morphine (MSIR) 15 MG tablet Take 1 tablet (15 mg total) by mouth every 4 (four) hours as needed (Severe pain 7-10/10).    MOUNJARO 10 mg/0.5 mL PnIj Inject 10 mg into the skin once a week. HOLD until all surgeries completed and out of rehab    nortriptyline (PAMELOR) 50 MG capsule Take 1 capsule (50 mg total) by mouth nightly.    ondansetron (ZOFRAN-ODT) 4 MG TbDL Take 2 tablets (8 mg total) by mouth every 6 (six) hours as needed (nausea).    pantoprazole (PROTONIX) 40 MG tablet Take 1 tablet (40  mg total) by mouth once daily.    polyethylene glycol (GLYCOLAX) 17 gram PwPk Take 17 g by mouth once daily.    senna (SENOKOT) 8.6 mg tablet Take 1 tablet by mouth 2 (two) times a day.    senna-docusate 8.6-50 mg (PERICOLACE) 8.6-50 mg per tablet Take 1 tablet by mouth 2 (two) times daily.    sucralfate (CARAFATE) 100 mg/mL suspension Take 10 mLs (1 g total) by mouth every 6 (six) hours.    traMADoL (ULTRAM) 50 mg tablet Take 1 tablet (50 mg total) by mouth every 6 (six) hours as needed (Moderate pain 4-6/10).    traZODone (DESYREL) 100 MG tablet Take 1 tablet (100 mg total) by mouth every evening.    vitamin D (VITAMIN D3) 1000 units Tab Take 1 tablet (1,000 Units total) by mouth once daily.     Antibiotics (From admission, onward)      Start     Stop Route Frequency Ordered    10/28/24 2100  amoxicillin-clavulanate 875-125mg per tablet 1 tablet         11/07/24 2059 Oral Every 12 hours 10/28/24 1623    10/26/24 2100  doxycycline tablet 100 mg         10/31/24 2059 Oral 2 times daily 10/26/24 1424          Antifungals (From admission, onward)      None          Antivirals (From admission, onward)      None             Immunization History   Administered Date(s) Administered    COVID-19, MRNA, LN-S, PF (MODERNA FULL 0.5 ML DOSE) 03/08/2021, 04/05/2021    Influenza 10/25/2021    Influenza - Quadrivalent 11/20/2014, 09/11/2015    Influenza - Quadrivalent - MDCK - PF 10/31/2022    Influenza - Quadrivalent - PF *Preferred* (6 months and older) 10/02/2017, 10/15/2018, 09/30/2019, 12/11/2020, 10/23/2021, 09/29/2023    Influenza - Trivalent - Afluria, Fluzone MDV 12/18/2012, 10/29/2013    Influenza Split 12/18/2012, 12/18/2012, 10/29/2013, 10/29/2013    Pneumococcal Conjugate - 20 Valent 11/09/2023    Pneumococcal Polysaccharide - 23 Valent 03/03/2017    Rsv, Bivalent, Rsvpref (Abrysvo) 11/09/2023    Tdap 03/16/2022    Vaccinia, smallpox monkeypox vaccine live, PF 08/15/2022, 09/12/2022    Zoster Recombinant 01/20/2022,  03/25/2022       Family History       Problem Relation (Age of Onset)    Alzheimer's disease Father    Cataracts Father    Heart attack Mother    Hypertension Father, Mother    Migraines Mother    Parkinsonism Father          Social History     Socioeconomic History    Marital status:    Tobacco Use    Smoking status: Never    Smokeless tobacco: Never   Substance and Sexual Activity    Alcohol use: Yes     Alcohol/week: 1.0 standard drink of alcohol     Types: 1 Glasses of wine per week     Comment: occasionally    Drug use: No    Sexual activity: Not Currently     Partners: Male     Birth control/protection: Condom     Comment: 10/2/17      Social Drivers of Health     Financial Resource Strain: High Risk (10/7/2024)    Received from [facility]    Overall Financial Resource Strain (CARDIA)     Difficulty of Paying Living Expenses: Very hard   Food Insecurity: No Food Insecurity (10/7/2024)    Received from [facility]    Hunger Vital Sign     Worried About Running Out of Food in the Last Year: Never true     Ran Out of Food in the Last Year: Never true   Recent Concern: Food Insecurity - Food Insecurity Present (7/22/2024)    Hunger Vital Sign     Worried About Running Out of Food in the Last Year: Sometimes true     Ran Out of Food in the Last Year: Sometimes true   Transportation Needs: No Transportation Needs (10/21/2024)    Received from [facility] [place] SDOH Transportation Source     Has lack of transportation kept you from medical appointments or from getting medications?: No     Has lack of transportation kept you from meetings, work, or from getting things needed for daily living?: No   Physical Activity: Inactive (9/8/2024)    Exercise Vital Sign     Days of Exercise per Week: 0 days     Minutes of Exercise per Session: 0 min   Stress: No Stress Concern Present (10/4/2024)    Received from [facility] - Occupational Stress Questionnaire      Feeling of Stress : Only a little   Recent Concern: Stress - Stress Concern Present (9/8/2024)    [facility] - Occupational Stress Questionnaire     Feeling of Stress : To some extent   Housing Stability: Low Risk  (10/7/2024)    Received from [facility] [place]    Housing Stability Vital Sign     Unable to Pay for Housing in the Last Year: No     Number of Times Moved in the Last Year: 1     Homeless in the Last Year: No     Review of Systems   Gastrointestinal:  Positive for constipation.   Skin:  Positive for wound.   All other systems reviewed and are negative.    Objective:     Vital Signs (Most Recent):  Temp: 98.3 °F (36.8 °C) (10/28/24 1708)  Pulse: 97 (10/28/24 1708)  Resp: 17 (10/28/24 1708)  BP: (!) 153/70 (10/28/24 1708)  SpO2: 96 % (10/28/24 1708) Vital Signs (24h Range):  Temp:  [98.2 °F (36.8 °C)-99.6 °F (37.6 °C)] 98.3 °F (36.8 °C)  Pulse:  [] 97  Resp:  [17-18] 17  SpO2:  [95 %-96 %] 96 %  BP: (106-153)/(60-72) 153/70     Weight: 92.5 kg (204 lb)  Body mass index is 33.95 kg/m².    Estimated Creatinine Clearance: 79 mL/min (based on SCr of 1 mg/dL).     Physical Exam  Vitals and nursing note reviewed.   Constitutional:       General: He is not in acute distress.     Appearance: Normal appearance. He is not ill-appearing or toxic-appearing.   HENT:      Mouth/Throat:      Pharynx: No oropharyngeal exudate.   Eyes:      General: No scleral icterus.     Conjunctiva/sclera: Conjunctivae normal.   Cardiovascular:      Rate and Rhythm: Normal rate.      Heart sounds: Normal heart sounds. No murmur heard.  Pulmonary:      Effort: No respiratory distress.      Breath sounds: Normal breath sounds.   Abdominal:      General: Bowel sounds are normal. There is no distension.      Palpations: Abdomen is soft.   Musculoskeletal:         General: No swelling or tenderness.      Cervical back: Neck supple. No tenderness.   Skin:     Findings: Erythema present.   Neurological:       Mental Status: He is alert and oriented to person, place, and time. Mental status is at baseline.   Psychiatric:         Behavior: Behavior normal.         Thought Content: Thought content normal.          Significant Labs: Blood Culture:   Recent Labs   Lab 09/06/24  0235 09/07/24  0910 09/08/24  1641 09/10/24  1711   LABBLOO Gram stain aer bottle: Gram positive cocci in clusters resembling Staph  Gram stain alicia bottle: Gram positive cocci in clusters resembling Staph  Results called to and read back by:Luciana Altamirano RN 09/06/2024  20:52  STAPHYLOCOCCUS AUREUS*  Gram stain aer bottle: Gram positive cocci in clusters resembling Staph  Gram stain alicia bottle: Gram positive cocci in clusters resembling Staph  Results called to and read back by:Luciana Altamirano RN 09/06/2024  20:55  STAPHYLOCOCCUS AUREUS  For susceptibility see order #[id number]  * Gram stain aer bottle: Gram positive cocci in clusters resembling Staph  Results called to and read back by: Roseanen Guevara RN 09/08/2024  14:07  Gram stain alicia bottle: Gram positive cocci in clusters resembling Staph  Positive results previously called 09/08/2024  STAPHYLOCOCCUS AUREUS  ID consult required at [facility].  For susceptibility see order #[id number]  *  Gram stain aer bottle: Gram positive cocci in clusters resembling Staph  Gram stain alicia bottle: Gram positive cocci in clusters resembling Staph  Results called to and read back by:Luciana Altamirano LPN 09/08/2024  03:59  STAPHYLOCOCCUS AUREUS  ID consult required at [facility].  For susceptibility see order #[id number]  * No growth after 5 days.  No growth after 5 days. No growth after 5 days.     CBC:   Recent Labs   Lab 10/27/24  0543 10/28/24  0528   WBC 12.76* 10.37   HGB 10.0* 8.9*   HCT 30.6* 27.5*    267     CMP:   Recent Labs   Lab 10/27/24  0543 10/28/24  0528   * 131*   K 4.1 4.5    100   CO2 23  21*    290*   BUN 16 19   CREATININE 0.9 1.0   CALCIUM 9.0 8.6*   PROT 6.4  --    ALBUMIN 2.2*  --    BILITOT 0.3  --    ALKPHOS 108  --    AST 14  --    ALT 9*  --    ANIONGAP 10 10     Microbiology Results (last 7 days)       ** No results found for the last 168 hours. **          Wound Culture:   Recent Labs   Lab 09/06/24  1446   LABAERO STAPHYLOCOCCUS AUREUS  Many  *  STREPTOCOCCUS AGALACTIAE (GROUP B)  Many  Beta-hemolytic streptococci are routinely susceptible to   penicillins,cephalosporins and carbapenems.  *     All pertinent labs within the past 24 hours have been reviewed.    Significant Imaging: I have reviewed all pertinent imaging results/findings within the past 24 hours.    I have personally reviewed records / hospital notes from   service and other specialty providers. I have also reviewed CBC, CMP/BMP,  cultures and imaging with my interpretation as documented in my assessment / plan.    Patient is high risk for infectious complications given pt's age, multiple co-morbidities, and case complexity.      Time: 75 minutes   50% of time spent on face-to-face counseling and coordination of care. Counseling included review of test results, diagnosis, and treatment plan with patient and/or family.

## 2024-10-28 NOTE — CONSULTS
Wound care consult received for assessment of R BKA and L upper back wound vac exchange.  Chart reviewed for this encounter.    Per chart review, pt taken to the OR w/ Plastics team yesterday, 10/27 w/ placement of NPWT to L upper back. Per Plastics note - OK to change wound vac dressing 2-3 days after placement. WC will anticipate changing dressing to L upper back tomorrow, 10/29 to get pt on a Tues/Fri schedule. Will complete assessment of R BKA tomorrow as well.  Please secure chat WC for questions/concerns.

## 2024-10-28 NOTE — PLAN OF CARE
[facility] - Internal Medicine Telemetry  Discharge Reassessment    Primary Care Provider: Andrew Sinclair Jr., MD    Expected Discharge Date: 10/28/2024    Reassessment (most recent)       Discharge Reassessment - 10/28/24 1309          Discharge Reassessment    Assessment Type Discharge Planning Reassessment (P)      Did the patient's condition or plan change since previous assessment? No (P)      Discharge Plan discussed with: Patient (P)      Communicated JAYLEEN with patient/caregiver Yes (P)      Discharge Plan A Home (P)      Discharge Plan B Home (P)      DME Needed Upon Discharge  negative pressure wound therapy device (P)      Transition of Care Barriers None (P)      Why the patient remains in the hospital Requires continued medical care (P)         Post-Acute Status    Discharge Delays None known at this time (P)                  CM spoke with pt in room.  He will be d/c'c with wound vac.  CM called [facility] [phone], she states they accept pt's insurance.  [facility] sent CM the short order form via email.  Pt to go home with ride from roommate once d/c'd.      1:47 PM  CM sent wound vac order to [facility] fax [phone].    4:24 PM  CM called [facility] wound care [phone], spoke with Sigrid.  She states to send them a fax [phone].  CM faxed referral.    CM received email from [facility], who states they did not receive wound vac referral.  ANDERS emailed to [email or web address].  Per return email, [facility] rec'd referral.    AMBIKA Martin, BS, RN, CCM      Discharge Plan A and Plan B have been determined by review of patient's clinical status, future medical and therapeutic needs, and coverage/benefits for post-acute care in coordination with multidisciplinary team members.

## 2024-10-28 NOTE — PROGRESS NOTES
[facility] - Internal Medicine Telemetry  Endocrinology  Progress Note    Admit Date: 10/25/2024     Reason for Consult: Management of T2DM, Hyperglycemia     Diabetes diagnosis year: 1995    Home Diabetes Medications:      Humalog via OmniPod insulin pump  Mounjaro 10 mg weekly     Current pump settings:  Basal 12 am to 2.3 and 6 am to 1.55  ICR to 3 at 12 am, 2 at 4 pm  ISF to 1:20   AIT 3 hrs  Target 110-120    Patient had anaphylaxis reaction to SGLT2 inhibitors specifically Invokana     Lab Results   Component Value Date    LABA1C 10.8 (H) 08/15/2016    HGBA1C 8.5 (H) 09/06/2024       How often checking glucose at home? Dexcom G6    BG readings on regimen: 130-200s  Hypoglycemia on the regimen?  No  Missed doses on regimen?  No    Diabetes Complications include:     Hyperglycemia and Diabetic peripheral neuropathy         Complicating diabetes co morbidities:   HLD, HTN, Obesity     HPI:   Patient is a 64 y.o. male with a diagnosis of insulin dependent type 2 diabetes mellitus with insulin pump, polyneuropathy due to type 2 diabetes mellitus, peripheral vascular disease, hyperlipidemia, And fall resulting in ORIF of right ankle in July of this year. Course parascapular free flap that did not take that ultimately resulted in getting BKA done 10/1. He was also found to have MSSA at that time. He was sent to Rehab after BKA procedure was done, with 2 week course IV oxacillin. Pt d/c from rehab on 10/20, and went to f/u w/ Plastic surgery today who noticed that he now has a non-healing wound to his back where his parascapular flap was extracted. He had also been complaining of some N/V and constipation. He was sent to the ED for admission, so he could undergo workup to r/o SBO as well as undergo debridement of his BKA and back wound with wound vac placement. In the ED, pt was afebrile HDS. CBC was done that showed WBC of 11, and CMP showed elevated glucose at 269. CT abd/pelvis was obtained to r/o SBO which was  "negative. Plastic Surgery team planning to see pt and do inpatient debridement procedure.Endocrinology consulted for management of T2DM.          Interval HPI:   Overnight events: No acute events overnight. Patient in room 1110/1110 A. Blood glucose worsening. BG above goal on current insulin regimen (SSI, prandial, and basal insulin ). Steroid use- None . 1 Day Post-Op  Renal function- Normal   Vasopressors-  None       Endocrine will continue to follow and manage insulin orders inpatient.         Diet diabetic 2000 Calories (up to 75 gm per meal)     Eatin%  Nausea: No  Hypoglycemia and intervention: No  Fever: No  TPN and/or TF: No  If yes, type of TF/TPN and rate: n/a    /64 (BP Location: Left arm, Patient Position: Lying)   Pulse 100   Temp 98.8 °F (37.1 °C) (Oral)   Resp 18   Wt 92.5 kg (204 lb)   SpO2 96%   BMI 33.95 kg/m²     Labs Reviewed and Include    Recent Labs   Lab 10/28/24  0528   *   CALCIUM 8.6*   *   K 4.5   CO2 21*      BUN 19   CREATININE 1.0     Lab Results   Component Value Date    WBC 10.37 10/28/2024    HGB 8.9 (L) 10/28/2024    HCT 27.5 (L) 10/28/2024    MCV 90 10/28/2024     10/28/2024     No results for input(s): "TSH", "FREET4" in the last 168 hours.  Lab Results   Component Value Date    HGBA1C 8.5 (H) 2024       Nutritional status:   Body mass index is 33.95 kg/m².  Lab Results   Component Value Date    ALBUMIN 2.2 (L) 10/27/2024    ALBUMIN 2.4 (L) 10/26/2024    ALBUMIN 2.7 (L) 10/25/2024     Lab Results   Component Value Date    PREALBUMIN 3 (L) 2024    PREALBUMIN 13 (L) 2024       Estimated Creatinine Clearance: 79 mL/min (based on SCr of 1 mg/dL).    Accu-Checks  Recent Labs     10/25/24  1859 10/27/24  0803 10/27/24  1127 10/27/24  1633 10/27/24  1813 10/27/24  1951 10/28/24  0752   POCTGLUCOSE 191* 143* 299* 252* 276* 324* 348*       Current Medications and/or Treatments Impacting Glycemic Control  Immunotherapy:  "   Immunosuppressants       None          Steroids:   Hormones (From admission, onward)      None          Pressors:    Autonomic Drugs (From admission, onward)      None          Hyperglycemia/Diabetes Medications:   Antihyperglycemics (From admission, onward)      Start     Stop Route Frequency Ordered    10/29/24 0900  insulin glargine U-100 (Lantus) pen 20 Units         -- SubQ Daily 10/28/24 1014    10/28/24 1130  insulin aspart U-100 pen 2-6 Units         -- SubQ 3 times daily with meals 10/28/24 0719    10/28/24 1130  insulin aspart U-100 pen 6 Units         -- SubQ 3 times daily with meals 10/28/24 1014    10/27/24 1033  insulin aspart U-100 pen 0-10 Units         -- SubQ Before meals & nightly PRN 10/27/24 0934            ASSESSMENT and PLAN    Endocrine  Class 1 obesity due to excess calories with serious comorbidity and body mass index (BMI) of 34.0 to 34.9 in adult  Body mass index is 33.95 kg/m².  May increase insulin resistance.         Insulin pump status  Insulin Pump orders d/c at this time. Patient does not have insulin supplies to continue use of pump inpatient.     Uncontrolled type 2 diabetes mellitus with hyperglycemia  BG goal 140-180  NPO this morning for wound debridement  Patient took his home insulin pump off early this morning. Patient will remain off insulin pump for the remainder of hospital stay as he is out of home insulin for his pump.     Given 24 units of Lantus this morning- BG continues to worsen throughout the day.     Plan:  Start Transition IV insulin infusion at 0.6 u/hr with stepdown parameters (pt has already received Lantus 24 units this morning) Will need to increase insulin infusion rate on 10/29  Increase Novolog to 8-12 units TID with meals (significant prandial excursions noted) Based on prn SQ insulin requirements  Moderate Dose Correction Scale  BG monitoring ac/hs/0200    ** Please call Endocrine for any BG related issues **        Orthopedic  * Surgical wound  breakdown  Managed per primary team  Optimize BG control            Meg Penaloza, NP  Endocrinology  [facility] - Internal Medicine Telemetry

## 2024-10-28 NOTE — PLAN OF CARE
Goals to met by 11/11/2024    1. Sit to stand transfer with Stand-by Assistance  2. Bed to chair transfer with Stand-by Assistance using Rolling Walker  3. Gait  x 50 feet with Stand-by Assistance using Rolling Walker   4. Ascend/Descend 4 inch curb step with Stand-by Assistance using Rolling Walker.  5. Lower extremity exercise program x15 reps per Instruction, with assistance as needed in order to facilitate improvement in functional independence    The mobility limitation cannot be sufficiently resolved by the use of a cane. Patient's functional mobility deficit can be sufficiently resolved with the use of a Rolling Walker. Patient's mobility limitation significantly impairs their ability to participate in one of more activities of daily living. The use of a Rolling Walker will significantly improve the patient's ability to participate in MRADLS and the patient will use it on regular basis in the home.       PT Eval: 10/28/2024

## 2024-10-28 NOTE — ASSESSMENT & PLAN NOTE
Insulin Pump orders d/c at this time. Patient does not have insulin supplies to continue use of pump inpatient.

## 2024-10-28 NOTE — PT/OT/SLP PROGRESS
"Occupational Therapy   Treatment    Name: Cortes Schroeder  MRN: [id number]  Admitting Diagnosis:  Surgical wound breakdown  1 Day Post-Op    Recommendations:     Discharge Recommendations: Low Intensity Therapy  Discharge Equipment Recommendations:  walker, rolling  Barriers to discharge:  None    Assessment:     Cortes Schroeder is a 64 y.o. male with a medical diagnosis of Surgical wound breakdown.  He presents with wound breakdown on scapula. Performance deficits affecting function are impaired endurance, weakness, impaired self care skills, impaired functional mobility, decreased upper extremity function. Pt was able to perform supine/sit T/F c CGA and sit/stand and bed/chair T/F c CGA and RW.  He declined to perform bathroom transfers d/t no grab bar present by toilet.  Able to perform LB dressing c set-up and UB dressing c min A.  He was able to perform UE exercises and tolerated well sitting UIC.    Rehab Prognosis:  Good; patient would benefit from acute skilled OT services to address these deficits and reach maximum level of function.       Plan:     Patient to be seen 3 x/week to address the above listed problems via self-care/home management, therapeutic exercises, therapeutic activities  Plan of Care Expires: 11/20/24  Plan of Care Reviewed with: patient    Subjective     Chief Complaint: Surgical wound breakdown  Patient/Family Comments/goals: "I'm not sitting there if there isn't a grab bar."  Pain/Comfort:  Pain Rating 1: 0/10    Objective:     Communicated with: RN prior to session.  Patient found supine with telemetry, wound vac upon OT entry to room.    General Precautions: Standard, fall    Orthopedic Precautions:N/A  Braces: N/A  Respiratory Status: Room air     Occupational Performance:     Bed Mobility:    Patient completed Supine to Sit with supervision     Functional Mobility/Transfers:  Patient completed Sit <> Stand Transfer with contact guard assistance  with  rolling walker   Patient " completed Bed <> Chair Transfer using Stand Pivot technique with contact guard assistance with rolling walker  Functional Mobility: Pt was able to walk to chair c CGA and RW.    Activities of Daily Living:  Upper Body Dressing: minimum assistance to don hospital gown.  Lower Body Dressing: supervision to don sock      [facility] 6 Click ADL: 18    Patient left up in chair with all lines intact, call button in reach, and RN notified    GOALS:   Multidisciplinary Problems       Occupational Therapy Goals          Problem: Occupational Therapy    Goal Priority Disciplines Outcome Interventions   Occupational Therapy Goal     OT, PT/OT Progressing    Description: Goals to be met by: 11/20/24     Patient will increase functional independence with ADLs by performing:    Grooming while standing at sink with Modified [place].  Toileting from toilet with Modified [place] for hygiene and clothing management.   Toilet transfer to toilet with Modified [place].  Upper extremity exercise program 3x10 reps per handout, with independence.    DME: Pt reports friend had RW for him, it turned out bariatric and does not fit through his doorways.   Hence, Patient demonstrates a mobility limitation that significantly impairs their ability to participate in one or more mobility related activities of daily living. Patient's mobility limitation cannot be sufficiently resolved with the use of a cane, but can be sufficiently resolved with the use of a rolling walker.The use of a rolling walker will considerably improve their ability to participate in MRADLs. Patient will use the walker on a regular basis at home.                           Time Tracking:     OT Date of Treatment: 10/28/24  OT Start Time: 0840  OT Stop Time: 0906  OT Total Time (min): 26 min    Billable Minutes:Evaluation 13  Self Care/Home Management 13               10/28/2024   You can access the [facility] Patient Portal, offered by [facility], by registering with the following website: [email or web address]

## 2024-10-28 NOTE — PROGRESS NOTES
[facility] - Internal Medicine Telemetry  Wound Care    Patient Name:  Cortes Schroeder   MRN:  [id number]  Date: 10/28/2024  Diagnosis: Surgical wound breakdown    History:     Past Medical History:   Diagnosis Date    Anxiety 12/18/2012    Back pain 12/18/2012    Cataract     Chronic pain syndrome 04/24/2016    Diabetes type 2, controlled 02/20/2016    Diabetic retinopathy     DM (diabetes mellitus) 12/18/2012    DM (diabetes mellitus), type 2, uncontrolled 11/16/2013    Gastroesophageal reflux disease without esophagitis 02/20/2016    Hyperlipidemia 12/18/2012    Insomnia 08/07/2014    Neuropathy 11/16/2013       Social History     Socioeconomic History    Marital status:    Tobacco Use    Smoking status: Never    Smokeless tobacco: Never   Substance and Sexual Activity    Alcohol use: Yes     Alcohol/week: 1.0 standard drink of alcohol     Types: 1 Glasses of wine per week     Comment: occasionally    Drug use: No    Sexual activity: Not Currently     Partners: Male     Birth control/protection: Condom     Comment: 10/2/17      Social Drivers of Health     Financial Resource Strain: High Risk (10/7/2024)    Received from [facility]    Overall Financial Resource Strain (CARDIA)     Difficulty of Paying Living Expenses: Very hard   Food Insecurity: No Food Insecurity (10/7/2024)    Received from [facility]    Hunger Vital Sign     Worried About Running Out of Food in the Last Year: Never true     Ran Out of Food in the Last Year: Never true   Recent Concern: Food Insecurity - Food Insecurity Present (7/22/2024)    Hunger Vital Sign     Worried About Running Out of Food in the Last Year: Sometimes true     Ran Out of Food in the Last Year: Sometimes true   Transportation Needs: No Transportation Needs (10/21/2024)    Received from [facility]    [facility] Transportation Source     Has lack of transportation kept you from medical appointments or from getting medications?: No     Has lack of  transportation kept you from meetings, work, or from getting things needed for daily living?: No   Physical Activity: Inactive (9/8/2024)    Exercise Vital Sign     Days of Exercise per Week: 0 days     Minutes of Exercise per Session: 0 min   Stress: No Stress Concern Present (10/4/2024)    Received from [facility] - Occupational Stress Questionnaire     Feeling of Stress : Only a little   Recent Concern: Stress - Stress Concern Present (9/8/2024)    [facility] Occupational [facility] - Occupational Stress Questionnaire     Feeling of Stress : To some extent   Housing Stability: Low Risk  (10/7/2024)    Received from [facility]    Housing Stability Vital Sign     Unable to Pay for Housing in the Last Year: No     Number of Times Moved in the Last Year: 1     Homeless in the Last Year: No       Precautions:     Allergies as of 10/25/2024 - Reviewed 10/25/2024   Allergen Reaction Noted    Invokana [canagliflozin] Anaphylaxis 11/14/2016    Percocet [oxycodone-acetaminophen] Nausea Only and Hallucinations 10/24/2016    Biaxin [clarithromycin]  12/18/2012    Hydrocodone Other (See Comments) 03/20/2014    Sulfa (sulfonamide antibiotics) Nausea Only and Rash 12/18/2012       WO Assessment Details/Treatment     Secure chat received for L bicep incision - continued bleeding.  L inferior bicep wound continues to ooze sanguinous drainage despite Aquacel Ag dressing applied this AM. Gently cleansed w/ Vashe, QuikClot applied once dry to assist w/ bleeding control followed by Mepilex dressing. ACE wrap wrapped around the area to assist w/ holding pressure. Instructed pt not to apply too much weight to this arm at this time. Will follow up w/ pt early tomorrow.  Supplies at bedside.  Secure chat sent to Dr. dAam w/ Plastic surgery.

## 2024-10-28 NOTE — PROGRESS NOTES
[facility] - Internal Medicine Telemetry  Wound Care    Patient Name:  Cortes Schroeder   MRN:  [id number]  Date: 10/28/2024  Diagnosis: Surgical wound breakdown    History:     Past Medical History:   Diagnosis Date    Anxiety 12/18/2012    Back pain 12/18/2012    Cataract     Chronic pain syndrome 04/24/2016    Diabetes type 2, controlled 02/20/2016    Diabetic retinopathy     DM (diabetes mellitus) 12/18/2012    DM (diabetes mellitus), type 2, uncontrolled 11/16/2013    Gastroesophageal reflux disease without esophagitis 02/20/2016    Hyperlipidemia 12/18/2012    Insomnia 08/07/2014    Neuropathy 11/16/2013       Social History     Socioeconomic History    Marital status:    Tobacco Use    Smoking status: Never    Smokeless tobacco: Never   Substance and Sexual Activity    Alcohol use: Yes     Alcohol/week: 1.0 standard drink of alcohol     Types: 1 Glasses of wine per week     Comment: occasionally    Drug use: No    Sexual activity: Not Currently     Partners: Male     Birth control/protection: Condom     Comment: 10/2/17      Social Drivers of Health     Financial Resource Strain: High Risk (10/7/2024)    Received from [facility]    Overall Financial Resource Strain (CARDIA)     Difficulty of Paying Living Expenses: Very hard   Food Insecurity: No Food Insecurity (10/7/2024)    Received from [facility]    Hunger Vital Sign     Worried About Running Out of Food in the Last Year: Never true     Ran Out of Food in the Last Year: Never true   Recent Concern: Food Insecurity - Food Insecurity Present (7/22/2024)    Hunger Vital Sign     Worried About Running Out of Food in the Last Year: Sometimes true     Ran Out of Food in the Last Year: Sometimes true   Transportation Needs: No Transportation Needs (10/21/2024)    Received from [facility]    [facility] Transportation Source     Has lack of transportation kept you from medical appointments or from getting medications?: No     Has lack of  transportation kept you from meetings, work, or from getting things needed for daily living?: No   Physical Activity: Inactive (9/8/2024)    Exercise Vital Sign     Days of Exercise per Week: 0 days     Minutes of Exercise per Session: 0 min   Stress: No Stress Concern Present (10/4/2024)    Received from [facility] [place] [facility] - Occupational Stress Questionnaire     Feeling of Stress : Only a little   Recent Concern: Stress - Stress Concern Present (9/8/2024)    [facility] [place] [facility] - Occupational Stress Questionnaire     Feeling of Stress : To some extent   Housing Stability: Low Risk  (10/7/2024)    Received from [facility]    Housing Stability Vital Sign     Unable to Pay for Housing in the Last Year: No     Number of Times Moved in the Last Year: 1     Homeless in the Last Year: No       Precautions:     Allergies as of 10/25/2024 - Reviewed 10/25/2024   Allergen Reaction Noted    Invokana [canagliflozin] Anaphylaxis 11/14/2016    Percocet [oxycodone-acetaminophen] Nausea Only and Hallucinations 10/24/2016    Biaxin [clarithromycin]  12/18/2012    Hydrocodone Other (See Comments) 03/20/2014    Sulfa (sulfonamide antibiotics) Nausea Only and Rash 12/18/2012       WO Assessment Details/Treatment     Secure chat received from primary team MD. Team would like L lateral back wound vac dressing changed today for measurements/wound care orders for anticipated D/C tomorrow. Pt to D/C home tomorrow w/ home wound vac, pt would like to go to the wound clinic at [facility] for dressing changes.    Pt found sitting up in chair, agreeable to care at this time.  See flowsheets for findings.    L upper lateral vac dressing removed w/ use of adhesive remover, wound and ashley wound cleansed w/ Vashe for antimicrobial properties. Wound bed is red and moist w/ granulation tissue present to L side of wound bed, minor amount of slough present. Incision lines pink and  well approximated w/ staples in place to incisional line extending down L bicep as well as scattered staples to inferior incision. Distal area of L bicep incision w/ some blood oozing.    Once clean, ashley wound prepped w/ Cavilon barrier film, incisional lines covered w/ thin hydrocolloid dressings, wound bed 'picture framed' w/ hydrocolloid as well for protection against maceration. X1 piece of UrgoTul cut and placed to wound bed d/t exposed fascia followed by x1 piece of black sponge. Trac pad applied w/ 'mushroom' sponge to prevent excess pressure from trac pad. Good seal obtained @125mmHg.    Given bleeding to L distal bicep incision, decision made to place UrgoTul as antimicrobial non-adherent layer followed by Aquacel Ag as silver will assist w/ bleeding control, secured w/ Mepilex foam dressing. This dressing can be changed q3 days or with every vac dressing exchange until bleeding resolved.    Attention was then paid to R BKA - cast padding/coban dressing removed. Incision is intact w/ areas of dry eschar, no open areas or drainage at this time. Ashley wound is pink, however, no swelling or pain. Pt states he does not like having the wrap on at this time. Cleansed w/ Vashe, betadine applied to areas of eschar followed by island border dressing.    Anticipate next [place] dressing exchange Thursday, 10/31 should pt remain at [facility] [place].    RECOMMENDATIONS:  Daily - R BKA - cleanse gently w/ Vashe, pat dry and apply Betadine to areas of eschar, cover w/ island border dressing once dry. Elevate leg w/ pillows while in bed.    Mon/Thurs - W[place] dressing  - remove old dressing w/ use of adhesive remover. Cleanse wound and surrounding skin w/ Vashe, pat dry. Protect incision lines and picture-frame wound w/ hydrocolloid dressings. Place UrgoTul (non-adherent) dressing to wound bed first followed by black sponge. Settings - 125mmHg.     Mon/Thurs w/ W[place] dressing changes & PRN for soilage/saturation - L inferior bicep incision -  cleanse gently w/ Vashe, pat dry and apply Aquacel Ag to areas of bleeding, cover w/ Mepilex foam dressing.     10/28/24 1050   WOCN Assessment   WOCN Total Time (mins) 60   Visit Date 10/28/24   Visit Time 1050   Consult Type New   WOCN Speciality Wound   WOCN List wound vac   Wound surgical   Procedure wound vac   Intervention assessed;changed;applied;chart review;coordination of care;orders   Teaching on-going        Wound 10/25/24 1512 Incision Left lateral;upper;posterior Back other (see comments)   Date First Assessed/Time First Assessed: 10/25/24 1512   Present on Original Admission: Yes  Primary Wound Type: Incision  Side: Left  Orientation: lateral;upper;posterior  Location: Back  Incision Type: (c) other (see comments)  Closure Method: Staples   Wound Image      Dressing Appearance Moist drainage   Drainage Amount Small   Drainage Characteristics/Odor Serosanguineous   Appearance Pink;Red;Moist;Granulating;Muscle;Slough   Red (%), Wound Tissue Color 90 %   Yellow (%), Wound Tissue Color 10 %   Periwound Area Redness   Wound Edges Defined;Open   Wound Length (cm) 6 cm   Wound Width (cm) 6.8 cm   Wound Depth (cm) 0.9 cm   Wound Volume (cm^3) 36.72 cm^3   Wound Surface Area (cm^2) 40.8 cm^2   Care Cleansed with:;Antimicrobial agent;Applied:;Skin Barrier   Dressing Applied;Other (comment)  (NPWT)   Dressing Change Due 10/31/24        Wound 10/01/24 1604 Incision Right Leg   Date First Assessed/Time First Assessed: 10/01/24 1604   Present on Original Admission: No  Primary Wound Type: Incision  Side: Right  Location: (c) Leg  Closure Method: Sutures;Closure Device;Other (see comments)   Wound Image    Dressing Appearance Dry;Intact;Clean   Drainage Amount None   Drainage Characteristics/Odor No odor   Appearance Intact;Dry;Eschar   Periwound Area Intact;Dry;Pink   Care Cleansed with:;Antimicrobial agent;Applied:;Povidone iodine   Dressing Applied;Island/border   Dressing Change Due 10/29/24        Negative  Pressure Wound Therapy  10/27/24 1606 Left lateral   Placement Date/Time: 10/27/24 1606   Side: Left  Orientation: lateral  Location: Back   NPWT Type Vacuum Therapy   Therapy Setting NPWT Continuous therapy   Pressure Setting NPWT 125 mmHg   Therapy Interventions NPWT Dressing changed;Trac pad replaced;Seal intact   Sponges Inserted NPWT Black;1;Other  (x1 UrgoTul)   Sponges Removed NPWT Black;1;Other  (Urgotul x1)

## 2024-10-28 NOTE — ASSESSMENT & PLAN NOTE
Patient's FSGs are uncontrolled due to hyperglycemia on current medication regimen.  Last A1c reviewed-   Lab Results   Component Value Date    LABA1C 10.8 (H) 08/15/2016    HGBA1C 8.5 (H) 09/06/2024     Most recent fingerstick glucose reviewed-   Recent Labs   Lab 10/27/24  1813 10/27/24  1951 10/28/24  0752 10/28/24  1215   POCTGLUCOSE 276* 324* 348* 397*     Current correctional scale  Medium  Increase anti-hyperglycemic dose as follows-   Antihyperglycemics (From admission, onward)      Start     Stop Route Frequency Ordered    10/28/24 1645  insulin aspart U-100 pen 8-12 Units         -- SubQ 3 times daily with meals 10/28/24 1251    10/28/24 1400  insulin regular in 0.9 % NaCl 100 unit/100 mL (1 unit/mL) infusion        Question:  Enter initial dose (Units/hr):  Answer:  0.6    -- IV Continuous 10/28/24 1251    10/28/24 1351  insulin aspart U-100 pen 0-10 Units         -- SubQ Before meals, nightly and at 0200 PRN 10/28/24 1251          Hold Oral hypoglycemics while patient is in the hospital.  Endocrine following, appreciate recs   - Plan for titrating insulin gtt for glucose optimization

## 2024-10-28 NOTE — SUBJECTIVE & OBJECTIVE
Interval History: NAEO. POD#1 of Wound Vac placement with Plastic Surgery. He reports doing well. BG elevated this morning. Adjusting insulin regimen per Endocrine.     Review of Systems  Objective:     Vital Signs (Most Recent):  Temp: 98.5 °F (36.9 °C) (10/28/24 1214)  Pulse: 93 (10/28/24 1214)  Resp: 17 (10/28/24 1214)  BP: (!) 152/72 (10/28/24 1214)  SpO2: 96 % (10/28/24 0750) Vital Signs (24h Range):  Temp:  [97.9 °F (36.6 °C)-99.6 °F (37.6 °C)] 98.5 °F (36.9 °C)  Pulse:  [] 93  Resp:  [13-19] 17  SpO2:  [95 %-100 %] 96 %  BP: (106-157)/(60-75) 152/72     Weight: 92.5 kg (204 lb)  Body mass index is 33.95 kg/m².    Intake/Output Summary (Last 24 hours) at 10/28/2024 1458  Last data filed at 10/27/2024 2000  Gross per 24 hour   Intake 920 ml   Output 510 ml   Net 410 ml         Physical Exam  HENT:      Head: Atraumatic.   Eyes:      Extraocular Movements: Extraocular movements intact.      Pupils: Pupils are equal, round, and reactive to light.   Cardiovascular:      Rate and Rhythm: Normal rate and regular rhythm.      Pulses: Normal pulses.      Heart sounds: Normal heart sounds.   Pulmonary:      Effort: Pulmonary effort is normal.      Breath sounds: Normal breath sounds.   Abdominal:      General: Bowel sounds are normal.      Palpations: Abdomen is soft.   Musculoskeletal:      Comments: -Wound Vac in place. Staples still in place, and minimal clear yellow drainage present  -R BKA site w/ poor healing wound   Neurological:      Mental Status: He is alert and oriented to person, place, and time.             Significant Labs: All pertinent labs within the past 24 hours have been reviewed.  CBC:   Recent Labs   Lab 10/27/24  0543 10/28/24  0528   WBC 12.76* 10.37   HGB 10.0* 8.9*   HCT 30.6* 27.5*    267     CMP:   Recent Labs   Lab 10/27/24  0543 10/28/24  0528   * 131*   K 4.1 4.5    100   CO2 23 21*    290*   BUN 16 19   CREATININE 0.9 1.0   CALCIUM 9.0 8.6*   PROT 6.4  --     ALBUMIN 2.2*  --    BILITOT 0.3  --    ALKPHOS 108  --    AST 14  --    ALT 9*  --    ANIONGAP 10 10       Significant Imaging: I have reviewed all pertinent imaging results/findings within the past 24 hours.

## 2024-10-28 NOTE — ASSESSMENT & PLAN NOTE
BG goal 140-180  NPO this morning for wound debridement  Patient took his home insulin pump off early this morning. Patient will remain off insulin pump for the remainder of hospital stay as he is out of home insulin for his pump.     Given 24 units of Lantus this morning- BG continues to worsen throughout the day.     Plan:  Start Transition IV insulin infusion at 0.6 u/hr with stepdown parameters (pt has already received Lantus 24 units this morning) Will need to increase insulin infusion rate on 10/29  Increase Novolog to 8-12 units TID with meals (significant prandial excursions noted) Based on prn SQ insulin requirements  Moderate Dose Correction Scale  BG monitoring ac/hs/0200    ** Please call Endocrine for any BG related issues **

## 2024-10-28 NOTE — PT/OT/SLP EVAL
Physical Therapy Evaluation and Treatment    Patient Name:  Cortes Schroeder   MRN:  [id number]  Admit Date: 10/25/2024  Admitting Diagnosis:  Surgical wound breakdown   Length of Stay: 2 days  Recent Surgery: Procedure(s) (LRB):  DEBRIDEMENT, WOUND, BACK (N/A)  APPLICATION, WOUND VAC 1 Day Post-Op    Recommendations:     Discharge Recommendations:  Low Intensity Therapy  Discharge Equipment Recommendations: walker, rolling   Justification for Equipment: The mobility limitation cannot be sufficiently resolved by the use of a cane. Patient's functional mobility deficit can be sufficiently resolved with the use of a Rolling Walker. Patient's mobility limitation significantly impairs their ability to participate in one of more activities of daily living. The use of a Rolling Walker will significantly improve the patient's ability to participate in MRADLS and the patient will use it on regular basis in the home.   Barriers to discharge: Evolving Clinical Presentation    Assessment:     Cortes Schroeder is a 64 y.o. male admitted with a medical diagnosis of Surgical wound breakdown.  He presents with the following impairments/functional limitations: impaired skin, impaired balance, pain, decreased safety awareness, gait instability, impaired functional mobility, decreased lower extremity function, impaired endurance.     Pt agreeable to therapy, eager to get up and move. After subjective interview and evaluation patient up for functional mobility when large amount of blood noted to be draining from dressing on L back. RN notified and wound care contacted, requests he does not bear weight through arm for rest of day so further therapy postponed until tomorrow.    Rehab Prognosis: Good; patient would benefit from acute skilled PT services to address these deficits and reach maximum level of function.      Treatment Tolerated: Fair    Highest level of mobility achieved this visit: Further mobility deferred due to bleeding at  "wound site    Activity with RN/PCT: transfer with one person assist    Plan:     During this hospitalization, patient to be seen 3 x/week to address the identified rehab impairments via gait training, therapeutic activities, therapeutic exercises, neuromuscular re-education and progress towards the established goals.    Plan of Care Expires:  11/28/24    Subjective     RN Praline notified prior to session. No family present upon PT entrance into room.    Chief Complaint: claustrophobia  Patient/Family Comments/goals: "I'm ready to get my prosthetic"  Pain/Comfort:  Pain Rating 1: 0/10    Social History:  Residence: lives with an adult  1-story house with 0 AMY and 2 small separate steps inside home.  Support available: friend(s)  Equipment Used: wheelchair, grab bar  Equipment Owned (not using): Walkers, Type: Platform Walker  Prior level of function: independent  Work: Disability   Drive: no.       Objective:     Additional staff present: none    Patient found up in chair with: telemetry, wound vac     General Precautions: Standard, fall   Orthopedic Precautions:N/A   Braces: N/A   Body mass index is 33.95 kg/m².  Oxygen Device: Room Air    Vitals: BP (!) 153/70 (BP Location: Left arm, Patient Position: Lying)   Pulse 97   Temp 98.3 °F (36.8 °C) (Oral)   Resp 17   Wt 92.5 kg (204 lb)   SpO2 96%   BMI 33.95 kg/m²     Exams:  Cognition:   Alert and Cooperative  Command following: Follows two-step verbal commands  Fluency: clear/fluent  Hearing: Intact  Vision:  Intact  Skin Integrity:  heavy bleeding noted to dressing near L scapula, RN notified    Physical Exam:   Edema - None noted  ROM - DERRICK LEs WFL  Strength - DERRICK LEs WFL   Sensation - Intact to light touch  Coordination - No deficits noted    Outcome Measures:    AM-PAC 6 CLICK MOBILITY  Turning over in bed (including adjusting bedclothes, sheets and blankets)?: 3  Sitting down on and standing up from a chair with arms (e.g., wheelchair, bedside " commode, etc.): 3  Moving from lying on back to sitting on the side of the bed?: 3  Moving to and from a bed to a chair (including a wheelchair)?: 3  Need to walk in hospital room?: 3  Climbing 3-5 steps with a railing?: 2  Basic Mobility Total Score: 17     Functional Mobility: Deferred due to bleeding at wound site      Education:  Time provided for education, counseling and discussion of health disposition in regards to patient's current status  All questions answered within PT scope of practice and to patient's satisfaction  PT role in POC to address current functional deficits    Patient left up in chair with all lines intact, call button in reach, and RN Praline and wound care notified.    GOALS:   Multidisciplinary Problems       Physical Therapy Goals          Problem: Physical Therapy    Goal Priority Disciplines Outcome Interventions   Physical Therapy Goal     PT, PT/OT Progressing    Description: Goals to met by 11/11/2024    1. Sit to stand transfer with Stand-by Assistance  2. Bed to chair transfer with Stand-by Assistance using Rolling Walker  3. Gait  x 50 feet with Stand-by Assistance using Rolling Walker   4. Ascend/Descend 4 inch curb step with Stand-by Assistance using Rolling Walker.  5. Lower extremity exercise program x15 reps per Instruction, with assistance as needed in order to facilitate improvement in functional independence    The mobility limitation cannot be sufficiently resolved by the use of a cane. Patient's functional mobility deficit can be sufficiently resolved with the use of a Rolling Walker. Patient's mobility limitation significantly impairs their ability to participate in one of more activities of daily living. The use of a Rolling Walker will significantly improve the patient's ability to participate in MRADLS and the patient will use it on regular basis in the home.                          History:     Past Medical History:   Diagnosis Date    Anxiety 12/18/2012    Back  pain 12/18/2012    Cataract     Chronic pain syndrome 04/24/2016    Diabetes type 2, controlled 02/20/2016    Diabetic retinopathy     DM (diabetes mellitus) 12/18/2012    DM (diabetes mellitus), type 2, uncontrolled 11/16/2013    Gastroesophageal reflux disease without esophagitis 02/20/2016    Hyperlipidemia 12/18/2012    Insomnia 08/07/2014    Neuropathy 11/16/2013       Past Surgical History:   Procedure Laterality Date    ANGIOGRAPHY OF LOWER EXTREMITY Right 9/17/2024    Procedure: Angiogram Extremity Unilateral;  Surgeon: GINA Morton II, MD;  [facility];  Service: Vascular;  Laterality: Right;  Fluro time 19.5 min  mGy 260.49    ANKLE HARDWARE REMOVAL Right 9/23/2024    Procedure: REMOVAL, HARDWARE, ANKLE;  Surgeon: Moe Liz MD;  [facility];  Service: Orthopedics;  Laterality: Right;    APPLICATION OF WOUND VACUUM-ASSISTED CLOSURE DEVICE Right 9/6/2024    Procedure: APPLICATION, WOUND VAC;  Surgeon: Moe Liz MD;  [facility];  Service: Orthopedics;  Laterality: Right;    APPLICATION OF WOUND VACUUM-ASSISTED CLOSURE DEVICE  10/27/2024    Procedure: APPLICATION, WOUND VAC;  Surgeon: Juanito Cueto DO;  [facility];  Service: Plastics;;    APPLICATION, EXTERNAL FIXATION DEVICE, FOR ANKLE FRACTURE Right 7/18/2024    Procedure: APPLICATION, EXTERNAL FIXATION DEVICE, FOR ANKLE FRACTURE;  Surgeon: Moe Liz MD;  [facility];  Service: Orthopedics;  Laterality: Right;    ARTHROTOMY OF ANKLE Right 9/9/2024    Procedure: ARTHROTOMY, ANKLE;  Surgeon: Moe Liz MD;  [facility];  Service: Orthopedics;  Laterality: Right;    BACK SURGERY  2003    Lumbar Spine    BELOW KNEE AMPUTATION OF LOWER EXTREMITY Right 10/1/2024    Procedure: AMPUTATION, BELOW KNEE - RIGHT;  Surgeon: Moe Liz MD;  [facility];  Service: Orthopedics;  Laterality: Right;  with wound vac  placement    CATARACT EXTRACTION      CLOSED REDUCTION, FRACTURE, ANKLE, TRIMALLEOLAR Right 7/18/2024    Procedure: CLOSED REDUCTION, FRACTURE, ANKLE, TRIMALLEOLAR;  Surgeon: Moe Liz MD;  [facility];  Service: Orthopedics;  Laterality: Right;    DEBRIDEMENT, WOUND, BACK N/A 10/27/2024    Procedure: DEBRIDEMENT, WOUND, BACK;  Surgeon: Juanito Cueto DO;  [facility];  Service: Plastics;  Laterality: N/A;  debridement of left back and right bka wounds, washout, wound vac placemenrt    EPIDURAL STEROID INJECTION N/A 1/16/2019    Procedure: Injection, Steroid, Epidural Cervical;  Surgeon: Andrew Sinclair Jr., MD;  Location: [facility] ENDO;  Service: Pain Management;  Laterality: N/A;  Cervical Epidural Steroid Injection C7-T1    21812    Arrive @ 1240    EPIDURAL STEROID INJECTION N/A 2/20/2019    Procedure: Injection, Steroid, Epidural;  Surgeon: Andrew Sinclair Jr., MD;  Location: [facility] ENDO;  Service: Pain Management;  Laterality: N/A;  Lumbar Epidural Steroid Injection L4-5    05234    Arrive @ 1215 (requests latest time)    FIXATION OF SYNDESMOSIS OF ANKLE Right 7/26/2024    Procedure: FIXATION, SYNDESMOSIS, ANKLE;  Surgeon: Moe Liz MD;  Location: [facility] OR [address] [place] [address];  Service: Orthopedics;  Laterality: Right;    FLAP PROCEDURE Right 9/26/2024    Procedure: CREATION, FREE FLAP;  Surgeon: Juanito Cueto DO;  Location: [facility] OR [address] [place] [address];  Service: Plastics;  Laterality: Right;  Spy; Def Free Flap; Micro instr., Microscope; 2 bovies, 2 teams    INJECTION, SPINE, LUMBOSACRAL, TRANSFORAMINAL APPROACH Bilateral 6/14/2024    Procedure: Bilateral L5 Transforaminal Epidural Steroid Injections;  Surgeon: Andrew Sinclair Jr., MD;  Location: [facility] PAIN MANAGEMENT;  Service: Pain Management;  Laterality: Bilateral;  @1300(given)  ASA last 6/8  Check BG  MD Sign.    IRRIGATION AND DEBRIDEMENT Right 9/6/2024    Procedure: IRRIGATION AND DEBRIDEMENT;  Surgeon:  Moe Liz MD;  Location: [facility] OR [address] [place] [address];  Service: Orthopedics;  Laterality: Right;    IRRIGATION AND DEBRIDEMENT Right 9/20/2024    Procedure: IRRIGATION AND DEBRIDEMENT;  Surgeon: Moe Liz MD;  Location: [facility] OR [address] [place] [address];  Service: Orthopedics;  Laterality: Right;    IRRIGATION AND DEBRIDEMENT OF LOWER EXTREMITY Right 9/9/2024    Procedure: IRRIGATION AND DEBRIDEMENT, LOWER EXTREMITY - WITH WOUND VAC CHANGE, RIGHT ANKLE;  Surgeon: Moe Liz MD;  Location: [facility] OR [address] [place] [address];  Service: Orthopedics;  Laterality: Right;  WITH WOUND VAC CHANGE, RIGHT ANKLE    OPEN REDUCTION AND INTERNAL FIXATION (ORIF) OF FRACTURE OF DISTAL RADIUS Left 7/19/2024    Procedure: ORIF, FRACTURE, RADIUS, DISTAL - LEFT;  Surgeon: Moe Liz MD;  Location: [facility] OR [address] [place] [address];  Service: Orthopedics;  Laterality: Left;  LEFT, SYNTHES, C ARM    OPEN REDUCTION AND INTERNAL FIXATION (ORIF) OF INJURY OF ANKLE Right 7/26/2024    Procedure: ORIF, ANKLE;  Surgeon: Moe Liz MD;  Location: [facility] OR [address] [place] [address];  Service: Orthopedics;  Laterality: Right;    REMOVAL OF EXTERNAL FIXATION DEVICE Right 7/26/2024    Procedure: REMOVAL, EXTERNAL FIXATION DEVICE;  Surgeon: Moe Liz MD;  Location: [address];  Service: Orthopedics;  Laterality: Right;    REPLACEMENT OF WOUND VACUUM-ASSISTED CLOSURE DEVICE Right 9/17/2024    Procedure: REPLACEMENT, WOUND VAC;  Surgeon: Moe Liz MD;  [address]  Service: Orthopedics;  Laterality: Right;  wound vac dressing exchange    REPLACEMENT OF WOUND VACUUM-ASSISTED CLOSURE DEVICE Right 9/20/2024    Procedure: REPLACEMENT, WOUND VAC;  Surgeon: Moe Liz MD;  Location: [facility] OR [address] [place] [address];  Service: Orthopedics;  Laterality: Right;    REPLACEMENT OF WOUND VACUUM-ASSISTED CLOSURE DEVICE Right 9/23/2024    Procedure: REPLACEMENT, WOUND VAC; white & black sponge;  Surgeon: Moe Liz MD;  Location: [facility] OR [address] [place] [address];  Service: Orthopedics;   Laterality: Right;       Time Tracking:     PT Received On: 10/28/24  PT Start Time: 1427     PT Stop Time: 1452  PT Total Time (min): 25 min     Billable Minutes: Evaluation 1 procedure and Therapeutic Activity 10 min    Mac Walls, PT, DPT  10/28/2024

## 2024-10-28 NOTE — PLAN OF CARE
Problem: Adult Inpatient Plan of Care  Goal: Plan of Care Review  Outcome: Progressing  Flowsheets (Taken 10/27/2024 1931)  Plan of Care Reviewed With: patient  Goal: Patient-Specific Goal (Individualized)  Outcome: Progressing  Goal: Absence of Hospital-Acquired Illness or Injury  Outcome: Progressing  Goal: Optimal Comfort and Wellbeing  Outcome: Progressing  Goal: Readiness for Transition of Care  Outcome: Progressing  Pt AAOX4, Denies any pain and discomfort during this shift, VS stable, scheduled meds given, surgery performed today wound vac in place. Educated pt on the use of call light, instructed the pt to call for assitance if needed, call light within reach. No acute events noted during this shift. Plan of care ongoing.

## 2024-10-28 NOTE — NURSING
Pt transferred from a different unit. Pt AAOX4, pt on room air, has wound vac to left shoulder, pt has a RBKA, pt started on insulin gtt with charge nurse. POC discussed, pt voiced understanding. Pt resting in bed, bed low and locked, call light and belongings in reach.

## 2024-10-28 NOTE — ASSESSMENT & PLAN NOTE
Pt with poor wound healing of right BKA site as well as left scapular region where free flap was previously extracted. He has already completed 2 week course oxacillin for MSSA. Now has started noticing more drainage especially from left scapular wound site. Seen by Plastics today, and sent to ED for admission to undergo debridement and wound vac placement    -S/p Wound Vac Placement with Plastic Surgery (10/28/24)  - On PO Doxycycline for MRSA skin coverage.   - ID consulted for further abx recommendations, appreciate recs  - Restarting Eliquis 2.5mg BID for DVT ppx  -Consult wound care for management once pt has debridement and wound vac placed   - Pt would like to continue Wound Care at [facility] at discharge

## 2024-10-29 ENCOUNTER — TELEPHONE (OUTPATIENT)
Dept: INFECTIOUS DISEASES | Facility: CLINIC | Age: 64
End: 2024-10-29
Payer: COMMERCIAL

## 2024-10-29 ENCOUNTER — OFFICE VISIT (OUTPATIENT)
Dept: CARDIOLOGY | Facility: CLINIC | Age: 64
End: 2024-10-29
Payer: COMMERCIAL

## 2024-10-29 ENCOUNTER — TELEPHONE (OUTPATIENT)
Dept: PLASTIC SURGERY | Facility: CLINIC | Age: 64
End: 2024-10-29
Payer: COMMERCIAL

## 2024-10-29 VITALS
RESPIRATION RATE: 18 BRPM | BODY MASS INDEX: 33.95 KG/M2 | DIASTOLIC BLOOD PRESSURE: 63 MMHG | WEIGHT: 204 LBS | HEART RATE: 100 BPM | OXYGEN SATURATION: 97 % | TEMPERATURE: 98 F | SYSTOLIC BLOOD PRESSURE: 131 MMHG

## 2024-10-29 DIAGNOSIS — E11.65 UNCONTROLLED TYPE 2 DIABETES MELLITUS WITH HYPERGLYCEMIA: ICD-10-CM

## 2024-10-29 DIAGNOSIS — R55 SYNCOPE, UNSPECIFIED SYNCOPE TYPE: ICD-10-CM

## 2024-10-29 DIAGNOSIS — E66.811 CLASS 1 OBESITY DUE TO EXCESS CALORIES WITH SERIOUS COMORBIDITY AND BODY MASS INDEX (BMI) OF 34.0 TO 34.9 IN ADULT: ICD-10-CM

## 2024-10-29 DIAGNOSIS — E78.00 PURE HYPERCHOLESTEROLEMIA: ICD-10-CM

## 2024-10-29 DIAGNOSIS — E66.09 CLASS 1 OBESITY DUE TO EXCESS CALORIES WITH SERIOUS COMORBIDITY AND BODY MASS INDEX (BMI) OF 34.0 TO 34.9 IN ADULT: ICD-10-CM

## 2024-10-29 DIAGNOSIS — I73.9 PAD (PERIPHERAL ARTERY DISEASE): ICD-10-CM

## 2024-10-29 DIAGNOSIS — I10 HYPERTENSION, UNSPECIFIED TYPE: Primary | ICD-10-CM

## 2024-10-29 DIAGNOSIS — R29.6 MULTIPLE FALLS: ICD-10-CM

## 2024-10-29 DIAGNOSIS — E11.21 DIABETIC NEPHROPATHY ASSOCIATED WITH TYPE 2 DIABETES MELLITUS: ICD-10-CM

## 2024-10-29 LAB
ANION GAP SERPL CALC-SCNC: 10 MMOL/L (ref 8–16)
BASOPHILS # BLD AUTO: 0.07 K/UL (ref 0–0.2)
BASOPHILS NFR BLD: 0.7 % (ref 0–1.9)
BUN SERPL-MCNC: 19 MG/DL (ref 8–23)
CALCIUM SERPL-MCNC: 9.3 MG/DL (ref 8.7–10.5)
CHLORIDE SERPL-SCNC: 108 MMOL/L (ref 95–110)
CO2 SERPL-SCNC: 27 MMOL/L (ref 23–29)
CREAT SERPL-MCNC: 0.9 MG/DL (ref 0.5–1.4)
DIFFERENTIAL METHOD BLD: ABNORMAL
EOSINOPHIL # BLD AUTO: 0.1 K/UL (ref 0–0.5)
EOSINOPHIL NFR BLD: 1.4 % (ref 0–8)
ERYTHROCYTE [DISTWIDTH] IN BLOOD BY AUTOMATED COUNT: 14.5 % (ref 11.5–14.5)
EST. GFR  (NO RACE VARIABLE): >60 ML/MIN/1.73 M^2
GLUCOSE SERPL-MCNC: 136 MG/DL (ref 70–110)
HCT VFR BLD AUTO: 29.2 % (ref 40–54)
HGB BLD-MCNC: 9.1 G/DL (ref 14–18)
IMM GRANULOCYTES # BLD AUTO: 0.07 K/UL (ref 0–0.04)
IMM GRANULOCYTES NFR BLD AUTO: 0.7 % (ref 0–0.5)
LYMPHOCYTES # BLD AUTO: 1.9 K/UL (ref 1–4.8)
LYMPHOCYTES NFR BLD: 20.1 % (ref 18–48)
MCH RBC QN AUTO: 28.3 PG (ref 27–31)
MCHC RBC AUTO-ENTMCNC: 31.2 G/DL (ref 32–36)
MCV RBC AUTO: 91 FL (ref 82–98)
MONOCYTES # BLD AUTO: 0.9 K/UL (ref 0.3–1)
MONOCYTES NFR BLD: 9.8 % (ref 4–15)
NEUTROPHILS # BLD AUTO: 6.4 K/UL (ref 1.8–7.7)
NEUTROPHILS NFR BLD: 67.3 % (ref 38–73)
NRBC BLD-RTO: 0 /100 WBC
PHOSPHATE SERPL-MCNC: 4.1 MG/DL (ref 2.7–4.5)
PLATELET # BLD AUTO: 301 K/UL (ref 150–450)
PMV BLD AUTO: 10.9 FL (ref 9.2–12.9)
POCT GLUCOSE: 128 MG/DL (ref 70–110)
POCT GLUCOSE: 193 MG/DL (ref 70–110)
POCT GLUCOSE: 226 MG/DL (ref 70–110)
POTASSIUM SERPL-SCNC: 4.5 MMOL/L (ref 3.5–5.1)
RBC # BLD AUTO: 3.21 M/UL (ref 4.6–6.2)
SODIUM SERPL-SCNC: 145 MMOL/L (ref 136–145)
WBC # BLD AUTO: 9.55 K/UL (ref 3.9–12.7)

## 2024-10-29 PROCEDURE — 1160F RVW MEDS BY RX/DR IN RCRD: CPT | Mod: CPTII,95,, | Performed by: INTERNAL MEDICINE

## 2024-10-29 PROCEDURE — 3052F HG A1C>EQUAL 8.0%<EQUAL 9.0%: CPT | Mod: CPTII,95,, | Performed by: INTERNAL MEDICINE

## 2024-10-29 PROCEDURE — 25000003 PHARM REV CODE 250

## 2024-10-29 PROCEDURE — 1111F DSCHRG MED/CURRENT MED MERGE: CPT | Mod: CPTII,95,, | Performed by: INTERNAL MEDICINE

## 2024-10-29 PROCEDURE — 84100 ASSAY OF PHOSPHORUS: CPT

## 2024-10-29 PROCEDURE — 25000003 PHARM REV CODE 250: Performed by: HOSPITALIST

## 2024-10-29 PROCEDURE — 1159F MED LIST DOCD IN RCRD: CPT | Mod: CPTII,95,, | Performed by: INTERNAL MEDICINE

## 2024-10-29 PROCEDURE — 36415 COLL VENOUS BLD VENIPUNCTURE: CPT

## 2024-10-29 PROCEDURE — 99232 SBSQ HOSP IP/OBS MODERATE 35: CPT | Mod: ,,, | Performed by: NURSE PRACTITIONER

## 2024-10-29 PROCEDURE — 80048 BASIC METABOLIC PNL TOTAL CA: CPT | Performed by: STUDENT IN AN ORGANIZED HEALTH CARE EDUCATION/TRAINING PROGRAM

## 2024-10-29 PROCEDURE — 99205 OFFICE O/P NEW HI 60 MIN: CPT | Mod: 95,,, | Performed by: INTERNAL MEDICINE

## 2024-10-29 PROCEDURE — 4010F ACE/ARB THERAPY RXD/TAKEN: CPT | Mod: CPTII,95,, | Performed by: INTERNAL MEDICINE

## 2024-10-29 PROCEDURE — 85025 COMPLETE CBC W/AUTO DIFF WBC: CPT

## 2024-10-29 PROCEDURE — 25000003 PHARM REV CODE 250: Performed by: STUDENT IN AN ORGANIZED HEALTH CARE EDUCATION/TRAINING PROGRAM

## 2024-10-29 RX ORDER — AMOXICILLIN 250 MG
1 CAPSULE ORAL 2 TIMES DAILY
Status: DISCONTINUED | OUTPATIENT
Start: 2024-10-29 | End: 2024-10-29 | Stop reason: HOSPADM

## 2024-10-29 RX ORDER — ENOXAPARIN SODIUM 100 MG/ML
40 INJECTION SUBCUTANEOUS EVERY 24 HOURS
Status: DISCONTINUED | OUTPATIENT
Start: 2024-10-29 | End: 2024-10-29 | Stop reason: HOSPADM

## 2024-10-29 RX ORDER — POLYETHYLENE GLYCOL 3350 17 G/17G
17 POWDER, FOR SOLUTION ORAL 2 TIMES DAILY
Status: DISCONTINUED | OUTPATIENT
Start: 2024-10-29 | End: 2024-10-29 | Stop reason: HOSPADM

## 2024-10-29 RX ORDER — AMOXICILLIN AND CLAVULANATE POTASSIUM 875; 125 MG/1; MG/1
1 TABLET, FILM COATED ORAL EVERY 12 HOURS
Qty: 20 TABLET | Refills: 0 | Status: SHIPPED | OUTPATIENT
Start: 2024-10-29 | End: 2024-11-08

## 2024-10-29 RX ORDER — DOXYCYCLINE HYCLATE 100 MG
100 TABLET ORAL 2 TIMES DAILY
Qty: 20 TABLET | Refills: 0 | Status: SHIPPED | OUTPATIENT
Start: 2024-10-29 | End: 2024-11-08

## 2024-10-29 RX ORDER — POLYETHYLENE GLYCOL 3350 17 G/17G
17 POWDER, FOR SOLUTION ORAL ONCE
Status: COMPLETED | OUTPATIENT
Start: 2024-10-29 | End: 2024-10-29

## 2024-10-29 RX ADMIN — INSULIN ASPART 8 UNITS: 100 INJECTION, SOLUTION INTRAVENOUS; SUBCUTANEOUS at 12:10

## 2024-10-29 RX ADMIN — INSULIN ASPART 8 UNITS: 100 INJECTION, SOLUTION INTRAVENOUS; SUBCUTANEOUS at 08:10

## 2024-10-29 RX ADMIN — APIXABAN 2.5 MG: 2.5 TABLET, FILM COATED ORAL at 08:10

## 2024-10-29 RX ADMIN — DULOXETINE HYDROCHLORIDE 60 MG: 60 CAPSULE, DELAYED RELEASE ORAL at 08:10

## 2024-10-29 RX ADMIN — SENNOSIDES AND DOCUSATE SODIUM 1 TABLET: 50; 8.6 TABLET ORAL at 12:10

## 2024-10-29 RX ADMIN — POLYETHYLENE GLYCOL 3350 17 G: 17 POWDER, FOR SOLUTION ORAL at 08:10

## 2024-10-29 RX ADMIN — ALUMINUM HYDROXIDE, MAGNESIUM HYDROXIDE, AND SIMETHICONE 30 ML: 200; 200; 20 SUSPENSION ORAL at 05:10

## 2024-10-29 RX ADMIN — PANTOPRAZOLE SODIUM 40 MG: 40 TABLET, DELAYED RELEASE ORAL at 08:10

## 2024-10-29 RX ADMIN — HYDRALAZINE HYDROCHLORIDE 50 MG: 25 TABLET ORAL at 05:10

## 2024-10-29 RX ADMIN — INSULIN ASPART 4 UNITS: 100 INJECTION, SOLUTION INTRAVENOUS; SUBCUTANEOUS at 08:10

## 2024-10-29 RX ADMIN — HYDRALAZINE HYDROCHLORIDE 50 MG: 25 TABLET ORAL at 02:10

## 2024-10-29 RX ADMIN — DOXYCYCLINE HYCLATE 100 MG: 100 TABLET, COATED ORAL at 08:10

## 2024-10-29 RX ADMIN — AMLODIPINE BESYLATE 10 MG: 10 TABLET ORAL at 08:10

## 2024-10-29 RX ADMIN — GABAPENTIN 300 MG: 300 CAPSULE ORAL at 08:10

## 2024-10-29 RX ADMIN — AMOXICILLIN AND CLAVULANATE POTASSIUM 1 TABLET: 875; 125 TABLET, FILM COATED ORAL at 08:10

## 2024-10-29 RX ADMIN — INSULIN ASPART 2 UNITS: 100 INJECTION, SOLUTION INTRAVENOUS; SUBCUTANEOUS at 12:10

## 2024-10-29 NOTE — PLAN OF CARE
"Discharge Plan A and Plan B have been determined by review of patient's clinical status, future medical and therapeutic needs, and coverage/benefits for post-acute care in coordination with multidisciplinary team members.        10/29/24 0910   Post-Acute Status   Post-Acute Authorization Other;HME   HME Status Set-up Complete/Auth obtained  ([facility] [id number])   Other Status See Comments  ([facility] [phone])   Hospital Resources/Appts/Education Provided Appointments scheduled and added to AVS   Patient choice form signed by patient/caregiver List from CMS Compare;List with quality metrics by geographic area provided   Discharge Delays None known at this time   Discharge Plan   Discharge Plan A Home with family   Discharge Plan B Home     CM met with patient and   to discuss discharge planning.  Patients plan is to  dc home with Driving to the [facility]. Patient will need a wound vac. Patients previous CM faxed needed documents and referral to [facility] wound vac and to [facility].     JAYLEEN: 10/29/24    0845 am  CM sent a secure email to Wendie Reddy with [facility] wound vac and waiting on a response for delivery ETA of wound vac. CM updated the patients medical team.    0904 am  CM spoke with Sigrid  at [facility] # [phone] and stated, " The referral was received late yesterday and the clinic manager, Raisa Flores RN has not reviewed the referral. Ms Flores will review in between patients and will call the CM with a determination of whether or not the patient will be accepted or denied.  ANDERS left work cell number with Sigrid. CM updated the team and the patient.     0905 am  Response from Wendie Reddy with [facility]   The  wound vac order is still pending but I will update it to be sent to his new room. Ill keep you updated with status!     11:37 am  ANDERS received a response from [facility] that the wound vac has been approved and " will be delivered by 4:00 pm    1:33 pm  CM called and spoke with Sigrid [facility] and the  has not reviewed the referral and will personally speak with clinic manager.    1:37 pm  CM called German Villasenor with [facility] [phone] to verify if [facility] has a wound care clinic in or near [place]  [address] and CM sent referral via secure email.       2:41 pm  CM received a phone call form Karly campoverde PT that the patient and his friend requested to see the CM re: discharge planning      2:45 - 2:55 pm  CM called to the patients room and       2:58 pm  CM called and spoke with Penelope Reddy  to confirm that wound vac is expected to arrive with in 30-45 minutes to the bedside.  CM updated the patent and  his friend  Juan Roberts [phone] the ETA arrival of wound vac     3:15 pm  CM spoke with German Villasenor at [facility] and confirmed receipt  of  ambulatory wound care clinic and will call and speak with the patient to schedule a clinic time and date.       Late entry  10/30/24  CM spoke with German mccormack did receive the ambulatory wound care referral and will follow up with the patient.    10/31/24 0845 am  CM called and spoke with Mr Juan Roberts [phone] and stated no one from [facility] has contacted him or the patient. CM confirmed Mr Martinez approval to provide his     10/31/24 0848 am  CM called and spoke with German MERCADO at [phone] and provided the name of the nurse who will be following the patient. CM called Leona [phone] and left a VM and waiting on a call back    10/31/24 1013 am    CM received a phone call from Leona [phone] with [facility] in [facility] and has contacted the patient and will assess and evaluate the patient in his home setting.     10/31/24  1014 am  CM called and confirmed above with patient via phone     Linda Carrasco RN  Case Management  [facility]  [phone]

## 2024-10-29 NOTE — NURSING
Pt d/c home with home wound vac in place and supplies. Pt left via personal WC to personal car with visitor. Pt left with  all belongings. Pt given d/c paperwork.

## 2024-10-29 NOTE — PLAN OF CARE
Problem: Skin Injury Risk Increased  Goal: Skin Health and Integrity  Outcome: Met     Problem: Adult Inpatient Plan of Care  Goal: Plan of Care Review  Outcome: Met  Goal: Patient-Specific Goal (Individualized)  Outcome: Met  Goal: Absence of Hospital-Acquired Illness or Injury  Outcome: Met  Goal: Optimal Comfort and Wellbeing  Outcome: Met  Goal: Readiness for Transition of Care  Outcome: Met     Problem: Diabetes Comorbidity  Goal: Blood Glucose Level Within Targeted Range  Outcome: Met     Problem: Wound  Goal: Optimal Coping  Outcome: Met  Goal: Optimal Functional Ability  Outcome: Met  Goal: Absence of Infection Signs and Symptoms  Outcome: Met  Goal: Improved Oral Intake  Outcome: Met  Goal: Optimal Pain Control and Function  Outcome: Met  Goal: Skin Health and Integrity  Outcome: Met  Goal: Optimal Wound Healing  Outcome: Met     Problem: Fall Injury Risk  Goal: Absence of Fall and Fall-Related Injury  Outcome: Met

## 2024-10-29 NOTE — PROGRESS NOTES
[facility] - Stepdown Flex ([place] [address])  Wound Care    Patient Name:  Cortes Schroeder   MRN:  [id number]  Date: 10/29/2024  Diagnosis: Surgical wound breakdown    History:     Past Medical History:   Diagnosis Date    Anxiety 12/18/2012    Back pain 12/18/2012    Cataract     Chronic pain syndrome 04/24/2016    Diabetes type 2, controlled 02/20/2016    Diabetic retinopathy     DM (diabetes mellitus) 12/18/2012    DM (diabetes mellitus), type 2, uncontrolled 11/16/2013    Gastroesophageal reflux disease without esophagitis 02/20/2016    Hyperlipidemia 12/18/2012    Insomnia 08/07/2014    Neuropathy 11/16/2013       Social History     Socioeconomic History    Marital status:    Tobacco Use    Smoking status: Never    Smokeless tobacco: Never   Substance and Sexual Activity    Alcohol use: Yes     Alcohol/week: 1.0 standard drink of alcohol     Types: 1 Glasses of wine per week     Comment: occasionally    Drug use: No    Sexual activity: Not Currently     Partners: Male     Birth control/protection: Condom     Comment: 10/2/17      Social Drivers of Health     Financial Resource Strain: High Risk (10/7/2024)    Received from [facility]    Overall Financial Resource Strain (CARDIA)     Difficulty of Paying Living Expenses: Very hard   Food Insecurity: No Food Insecurity (10/7/2024)    Received from [facility]    Hunger Vital Sign     Worried About Running Out of Food in the Last Year: Never true     Ran Out of Food in the Last Year: Never true   Recent Concern: Food Insecurity - Food Insecurity Present (7/22/2024)    Hunger Vital Sign     Worried About Running Out of Food in the Last Year: Sometimes true     Ran Out of Food in the Last Year: Sometimes true   Transportation Needs: No Transportation Needs (10/21/2024)    Received from [facility]    [facility] Transportation Source     Has lack of transportation kept you from medical appointments or from getting medications?: No     Has lack of  transportation kept you from meetings, work, or from getting things needed for daily living?: No   Physical Activity: Inactive (9/8/2024)    Exercise Vital Sign     Days of Exercise per Week: 0 days     Minutes of Exercise per Session: 0 min   Stress: No Stress Concern Present (10/4/2024)    Received from [facility] [place] of Occupational Health - Occupational Stress Questionnaire     Feeling of Stress : Only a little   Recent Concern: Stress - Stress Concern Present (9/8/2024)    [facility] of Occupational Health - Occupational Stress Questionnaire     Feeling of Stress : To some extent   Housing Stability: Low Risk  (10/7/2024)    Received from [facility]    Housing Stability Vital Sign     Unable to Pay for Housing in the Last Year: No     Number of Times Moved in the Last Year: 1     Homeless in the Last Year: No       Precautions:     Allergies as of 10/25/2024 - Reviewed 10/25/2024   Allergen Reaction Noted    Invokana [canagliflozin] Anaphylaxis 11/14/2016    Percocet [oxycodone-acetaminophen] Nausea Only and Hallucinations 10/24/2016    Biaxin [clarithromycin]  12/18/2012    Hydrocodone Other (See Comments) 03/20/2014    Sulfa (sulfonamide antibiotics) Nausea Only and Rash 12/18/2012       WOC Assessment Details/Treatment     Pt seen for wound care follow-up assessment of L distal bicep incision following application of QuikClot d/t continued bleeding yesterday.  Chart reviewed for this encounter.    Pt found lying in bed, agreeable to care at this time. Ace wrap and mepilex dressing removed for L bicep, no strikethrough appreciated at this time. Hemostasis achieved, wound gently cleansed w/ Vashe, Aquacel Ag applied to incision line for antimicrobial properties, secured w/ Mepilex foam dressing.  Pt hopeful to go home this afternoon, waiting on home vac to be delivered. Bedside nursing to switch pt over to home vac for D/C as tubing should be compatible. [place] dressing exchange  will be completed 10/31 should pt remain at [facility].       10/29/24 1230   WOCN Assessment   WOCN Total Time (mins) 15   Visit Date 10/29/24   Visit Time 1230   Consult Type Follow Up   WOCN Speciality Wound   Wound surgical   Intervention assessed;changed;applied;chart review;coordination of care   Teaching on-going

## 2024-10-29 NOTE — NURSING
"Nurse to room upon hearing yelling coming from room. Nurse entered room to find that pt and visitor were upset with multiple complaints. Pt and visitor verbalizing frustration with D/C disposition. Unit managers to room as well, all parties listened to complaints. Pt voiced "I am leaving with or without my home wound vac at 4pm" Pt and visitor requested pt advocacy, and med team. All notified of events and came to bedside and talked with pt and visitor.   "

## 2024-10-29 NOTE — SUBJECTIVE & OBJECTIVE
"Interval HPI:   Overnight events: No acute events overnight. Patient in room 98902/82117 A. Blood glucose stable. BG at goal on current insulin regimen (Transition insulin infusion, prandial, and SSI). Steroid use- None . 2 Days Post-Op  Renal function- Normal   Vasopressors-  None       Endocrine will continue to follow and manage insulin orders inpatient.         Diet diabetic 2000 Calories (up to 75 gm per meal)     Eatin%  Nausea: No  Hypoglycemia and intervention: No  Fever: No  TPN and/or TF: No  If yes, type of TF/TPN and rate: N/A    BP (!) 164/69   Pulse 104   Temp 98.5 °F (36.9 °C) (Oral)   Resp 18   Wt 92.5 kg (204 lb)   SpO2 95%   BMI 33.95 kg/m²     Labs Reviewed and Include    Recent Labs   Lab 10/29/24  0444   *   CALCIUM 9.3      K 4.5   CO2 27      BUN 19   CREATININE 0.9     Lab Results   Component Value Date    WBC 9.55 10/29/2024    HGB 9.1 (L) 10/29/2024    HCT 29.2 (L) 10/29/2024    MCV 91 10/29/2024     10/29/2024     No results for input(s): "TSH", "FREET4" in the last 168 hours.  Lab Results   Component Value Date    HGBA1C 8.5 (H) 2024       Nutritional status:   Body mass index is 33.95 kg/m².  Lab Results   Component Value Date    ALBUMIN 2.2 (L) 10/27/2024    ALBUMIN 2.4 (L) 10/26/2024    ALBUMIN 2.7 (L) 10/25/2024     Lab Results   Component Value Date    PREALBUMIN 3 (L) 2024    PREALBUMIN 13 (L) 2024       Estimated Creatinine Clearance: 87.8 mL/min (based on SCr of 0.9 mg/dL).    Accu-Checks  Recent Labs     10/27/24  1813 10/27/24  1951 10/28/24  0752 10/28/24  1215 10/28/24  1451 10/28/24  1523 10/28/24  1714 10/28/24  2103 10/29/24  0222 10/29/24  0800   POCTGLUCOSE 276* 324* 348* 397* 303* 297* 239* 179* 128* 226*       Current Medications and/or Treatments Impacting Glycemic Control  Immunotherapy:    Immunosuppressants       None          Steroids:   Hormones (From admission, onward)      None          Pressors:  "   Autonomic Drugs (From admission, onward)      None          Hyperglycemia/Diabetes Medications:   Antihyperglycemics (From admission, onward)      Start     Stop Route Frequency Ordered    10/28/24 1645  insulin aspart U-100 pen 8-12 Units         -- SubQ 3 times daily with meals 10/28/24 1251    10/28/24 1400  insulin regular in 0.9 % NaCl 100 unit/100 mL (1 unit/mL) infusion        Question:  Enter initial dose (Units/hr):  Answer:  1.5    -- IV Continuous 10/28/24 1251    10/28/24 1351  insulin aspart U-100 pen 0-10 Units         -- SubQ Before meals, nightly and at 0200 PRN 10/28/24 1251

## 2024-10-29 NOTE — DISCHARGE SUMMARY
[facility] - Stepdown Flex ([place] [address])  [facility] Medicine  Discharge Summary      Patient Name: Cortes Schroeder  MRN: [id number]  [facility]: [id number]  Patient Class: IP- Inpatient  Admission Date: 10/25/2024  Hospital Length of Stay: 3 days  Discharge Date and Time:  10/29/2024 3:47 PM  Attending Physician: Cristina Huff MD   Discharging Provider: Duane Ospina MD  Primary Care Provider: Andrew Sinclair Jr., MD  [facility] Medicine Team: [facility] HOSP MED 3 Duane Ospina MD  Primary Care Team: [facility] 3    HPI:   Mr. Cortes Schroeder is a 64 y.o. male with previous medical history of insulin dependent type 2 diabetes mellitus with insulin pump, polyneuropathy due to type 2 diabetes mellitus, peripheral vascular disease, hyperlipidemia, And fall resulting in ORIF of right ankle in July of this year. Course parascapular free flap that did not take that ultimately resulted in getting BKA done 10/1. He was also found to have MSSA at that time. He was sent to Rehab after BKA procedure was done, with 2 week course IV oxacillin. Pt d/c from rehab on 10/20, and went to f/u w/ Plastic surgery today who noticed that he now has a non-healing wound to his back where his parascapular flap was extracted. He had also been complaining of some N/V and constipation. He was sent to the ED for admission, so he could undergo workup to r/o SBO as well as undergo debridement of his BKA and back wound with wound vac placement. At bedside, pt states that he has had 3-4 days of N/V and constipation without a BM, which resolved earlier today and he started having regular BM again. He states that he did notice some drainage from his back wound 3 days ago that was mostly clear, with some blood. However, he denied any other sx of fever, chills, chest pain, SOB, or dysuria.      In the ED, pt was afebrile HDS. CBC was done that showed WBC of 11, and CMP showed elevated glucose at 269. CT abd/pelvis was obtained to r/o SBO which was negative. Plastic  Surgery team planning to see pt and do inpatient debridement procedure.    Procedure(s) (LRB):  DEBRIDEMENT, WOUND, BACK (N/A)  APPLICATION, WOUND VAC      Hospital Course:   Mr. Cortes Schroeder is a 64 y.o. male with previous medical history of insulin dependent type 2 diabetes mellitus with insulin pump, polyneuropathy due to type 2 diabetes mellitus, peripheral vascular disease, hyperlipidemia, And fall resulting in ORIF of right ankle in July of this year. Course parascapular free flap that did not take that ultimately resulted in getting BKA done 10/1. He was also found to have MSSA at that time. He was sent to Rehab after BKA procedure was done, with 2 week course IV oxacillin. Pt d/c from rehab on 10/20, and went to f/u w/ Plastic surgery today who noticed that he now has a non-healing wound to his back where his parascapular flap was extracted. He had also been complaining of some N/V and constipation. He was sent to the ED for admission, so he could undergo workup to r/o SBO as well as undergo debridement of his BKA and back wound with wound vac placement. On ED admission, CT abd/pelvis ruled out bowel obstruction and pt N/V resolved. He was admitted w/ consult placed to Plastic Surgery for wound debridement, as well as consult to Endocrinology for insulin pump and glucose management. Per plastic surgery team, added on Doxy for possible infected wound to get skin coverage. Pt underwent debridement 10/27 w/ plastic surgery. Infectious Disease team saw pt and recommended discharging w/ PO Doxycycline 100 BID plus PO Augmentin 875 BID x10 day course DON 11/08. Wound care saw pt, and he now has outpatient wound care referral placed for discharge so that they can continue to follow. 10/29 pt was determined to be medically ready for discharge. He was sent home with wound vac in place. Outpatient referral to follow up with wound care/ PT is in, and prescriptions sent to his home pharmacy to finish 10 day course PO  antibiotic regimen.     Goals of Care Treatment Preferences:  Code Status: Full Code    Living Will: Yes                 Consults:   Consults (From admission, onward)          Status Ordering Provider     Inpatient consult to Infectious Diseases  Once        Provider:  (Not yet assigned)    Completed RO HOPPER     Inpatient consult to Endocrinology  Once        Provider:  (Not yet assigned)    Completed LILY LAM     Inpatient consult to Plastic Surgery  Once        Provider:  (Not yet assigned)    Completed RO HOPPER            No new Assessment & Plan notes have been filed under this hospital service since the last note was generated.  Service: Hospital Medicine    Final Active Diagnoses:    Diagnosis Date Noted POA    PRINCIPAL PROBLEM:  Surgical wound breakdown [T81.31XA] 09/06/2024 Yes    Surgical site infection [T81.49XA] 09/06/2024 Yes    Uncontrolled type 2 diabetes mellitus with hyperglycemia [E11.65] 10/13/2023 Yes    Hypertension [I10] 06/19/2023 Yes    Class 1 obesity due to excess calories with serious comorbidity and body mass index (BMI) of 34.0 to 34.9 in adult [E66.811, E66.09, Z68.34] 03/16/2022 Not Applicable    Insulin pump status [Z96.41] 09/23/2020 Not Applicable    Gastroesophageal reflux disease without esophagitis [K21.9] 02/20/2016 Yes    Neuropathy [G62.9] 11/16/2013 Yes    Anxiety [F41.9] 12/18/2012 Yes      Problems Resolved During this Admission:       Discharged Condition: fair    Disposition:     Follow Up:   Follow-up Information       Andrew Sinclair Jr., MD Follow up.    Specialty: Pain Medicine  Contact information:  [address]  [place] [address]  [phone]               [facility] [place] Follow up.    Contact information:  [address]  [place], [id number] (o) [phone]             [facility] wound Vac Follow up.    Contact information:  [place], [place]  [phone]                         Patient  Instructions:      Ambulatory referral/consult to Wound Clinic   Standing Status: Future   Referral Priority: Routine Referral Type: Consultation   Referral Reason: Specialty Services Required   Requested Specialty: Wound Care   Number of Visits Requested: 1       Significant Diagnostic Studies: Labs: CMP   Recent Labs   Lab 10/28/24  0528 10/29/24  0444   * 145   K 4.5 4.5    108   CO2 21* 27   * 136*   BUN 19 19   CREATININE 1.0 0.9   CALCIUM 8.6* 9.3   ANIONGAP 10 10    and CBC   Recent Labs   Lab 10/28/24  0528 10/29/24  0444   WBC 10.37 9.55   HGB 8.9* 9.1*   HCT 27.5* 29.2*    301       Pending Diagnostic Studies:       None           Medications:  Reconciled Home Medications:      Medication List        START taking these medications      amoxicillin-clavulanate 875-125mg 875-125 mg per tablet  Commonly known as: AUGMENTIN  Take 1 tablet by mouth every 12 (twelve) hours. for 10 days     doxycycline 100 MG tablet  Commonly known as: VIBRA-TABS  Take 1 tablet (100 mg total) by mouth 2 (two) times daily. for 10 days            CONTINUE taking these medications      acetaminophen 500 MG tablet  Commonly known as: TYLENOL  Take 2 tablets (1,000 mg total) by mouth every 8 (eight) hours.     amLODIPine 10 MG tablet  Commonly known as: NORVASC  Take 1 tablet (10 mg total) by mouth once daily.     aspirin 81 MG EC tablet  Commonly known as: ECOTRIN  Take 1 tablet (81 mg total) by mouth once daily.     atorvastatin 40 MG tablet  Commonly known as: LIPITOR  Take 40 mg by mouth once daily.     calcium carbonate 200 mg calcium (500 mg) chewable tablet  Commonly known as: TUMS  Take 2 tablets (1,000 mg total) by mouth 3 (three) times daily as needed for Heartburn.     cyanocobalamin 1000 MCG tablet  Commonly known as: VITAMIN B-12  Take 1,000 mcg by mouth once daily.     DEXCOM G6 SENSOR O[place]ira  Generic drug: blood-glucose sensor  Change sensor every 10 days     DEXCOM G6 TRANSMITTER Omaira  Generic  drug: blood-glucose transmitter  Change transmitter every 3 months     DULoxetine 60 MG capsule  Commonly known as: CYMBALTA  Take 1 capsule (60 mg total) by mouth once daily.     emtricitabine-tenofovir 200-300 mg 200-300 mg Tab  Commonly known as: TRUVADA  Take 1 tablet by mouth once daily.     fish oil-omega-3 fatty acids 300-1,000 mg capsule  Take 1 capsule by mouth 2 (two) times daily.     furosemide 40 MG tablet  Commonly known as: LASIX  Take 1 tablet (40 mg total) by mouth once daily.     gabapentin 300 MG capsule  Commonly known as: NEURONTIN  Take 1 capsule (300 mg total) by mouth 2 (two) times daily.     HumaLOG U-100 Insulin 100 unit/mL injection  Generic drug: insulin lispro  1:20 U PRN high blood sugar and snacks. Correction dose- Enter carb coverage intake upon administration  Target number 120 Carbohydrate coverage #1 1:2 Carbohydrate coverage #1 time 7132-2378 Carbohydrate coverage #2 1:3 Carbohydrate coverage #2 time [phone] Carbohydrate coverage #3 Carbohydrate coverage #3 time Carbohydrate coverage #4 Carbohydrate coverage #4 time Sensitivity #1 1:20 Sensitivity #1 time 4016-7695 Sensitivity #2 Sensitivity #2 time Sensitivity #3 Sensitivity #3 time Sensitivity #4 Sensitivity #4 time Sensitivity #5 Sensitivity #5 time Order Questions Question Answer Comment       50 U SQ continuous  Target number 120 Basal Rate #1 2.3 Basal rate #1 time 7618-3754 Basal Rate #2 1.55 Basal rate #2 time 4098-8494 Basal rate #3 Basal rate #3 time Basal rate #4 Basal rate #4 time Basal rate #5 Basal rate #5 time     losartan 25 MG tablet  Commonly known as: COZAAR  Take 2 tablets (50 mg total) by mouth once daily.     magnesium oxide 400 mg (241.3 mg magnesium) tablet  Commonly known as: MAG-OX  Take 0.5 tablets (200 mg total) by mouth once daily.     melatonin 3 mg tablet  Commonly known as: MELATIN  Take 2 tablets (6 mg total) by mouth nightly as needed for Insomnia.     methocarbamoL 500 MG Tab  Commonly known as:  ROBAXIN  Take 1 tablet (500 mg total) by mouth every 8 (eight) hours.     MOUNJARO 10 mg/0.5 mL Pnij  Generic drug: tirzepatide  Inject 10 mg into the skin once a week. HOLD until all surgeries completed and out of rehab     nortriptyline 50 MG capsule  Commonly known as: PAMELOR  Take 1 capsule (50 mg total) by mouth nightly.     ondansetron 4 MG Tbdl  Commonly known as: ZOFRAN-ODT  Take 2 tablets (8 mg total) by mouth every 6 (six) hours as needed (nausea).     pantoprazole 40 MG tablet  Commonly known as: PROTONIX  Take 1 tablet (40 mg total) by mouth once daily.     senna-docusate 8.6-50 mg 8.6-50 mg per tablet  Commonly known as: PERICOLACE  Take 1 tablet by mouth 2 (two) times daily.     sucralfate 100 mg/mL suspension  Commonly known as: CARAFATE  Take 10 mLs (1 g total) by mouth every 6 (six) hours.     traMADoL 50 mg tablet  Commonly known as: ULTRAM  Take 1 tablet (50 mg total) by mouth every 6 (six) hours as needed (Moderate pain 4-6/10).     traZODone 100 MG tablet  Commonly known as: DESYREL  Take 1 tablet (100 mg total) by mouth every evening.     vitamin D 1000 units Tab  Commonly known as: VITAMIN D3  Take 1 tablet (1,000 Units total) by mouth once daily.            STOP taking these medications      apixaban 2.5 mg Tab  Commonly known as: ELIQUIS              Indwelling Lines/Drains at time of discharge:   Lines/Drains/Airways       None                   Time spent on the discharge of patient: 45 minutes         Duane Ospina MD  Department of Hospital Medicine  [facility] - Step[facility] ([place]-[facility])

## 2024-10-29 NOTE — ASSESSMENT & PLAN NOTE
BG goal 140-180  NPO this morning for wound debridement  Patient took his home insulin pump off early this morning. Patient will remain off insulin pump for the remainder of hospital stay as he is out of home insulin for his pump.     Plan:  Increase Transition IV insulin infusion to 1.5 u/hr with stepdown parameters (Home basal rate on pump)  Continue Novolog 8-12 units TID with meals (Give 8 units if patient eats 25-50% of meal and 12 units if patient eats 50% or greater)   Moderate Dose Correction Scale  BG monitoring ac/hs/0200    ** Please call Endocrine for any BG related issues **    Discharge plans:  Resume home insulin pump with previous home settings and follow up with primary endocrinologist as scheduled.

## 2024-10-29 NOTE — PT/OT/SLP PROGRESS
Occupational Therapy      Patient Name:  Cortes Schroeder   MRN:  [id number]    Patient not seen today secondary to Patient unwilling to participate, Increased agitation, Other (Comment). Upon arrival, Pt immediately deferred therapy today wishing for his nurse to come with a patient advocate. Visiting roommate appeared at bedside informing to therapist that he had traveled 3 hours from [place] for a 12pm appointment with a  to discuss discharge planning. Called and informed case management worker of current situation. Attempted to relay information to Pt and roommate, but it escalated with their requests to have 's supervisor involved immediately. Left with assigned  and RN in the room.     Will follow-up at next date of service.    10/29/2024

## 2024-11-05 ENCOUNTER — OFFICE VISIT (OUTPATIENT)
Dept: PLASTIC SURGERY | Facility: CLINIC | Age: 64
End: 2024-11-05
Payer: COMMERCIAL

## 2024-11-05 VITALS — DIASTOLIC BLOOD PRESSURE: 72 MMHG | HEART RATE: 69 BPM | SYSTOLIC BLOOD PRESSURE: 151 MMHG

## 2024-11-05 DIAGNOSIS — T81.31XD DEHISCENCE OF OPERATIVE WOUND, SUBSEQUENT ENCOUNTER: ICD-10-CM

## 2024-11-05 DIAGNOSIS — T81.30XA WOUND DEHISCENCE: Primary | ICD-10-CM

## 2024-11-05 PROCEDURE — 4010F ACE/ARB THERAPY RXD/TAKEN: CPT | Mod: CPTII,S$GLB,, | Performed by: SURGERY

## 2024-11-05 PROCEDURE — 99024 POSTOP FOLLOW-UP VISIT: CPT | Mod: S$GLB,,, | Performed by: SURGERY

## 2024-11-05 PROCEDURE — 3077F SYST BP >= 140 MM HG: CPT | Mod: CPTII,S$GLB,, | Performed by: SURGERY

## 2024-11-05 PROCEDURE — 1159F MED LIST DOCD IN RCRD: CPT | Mod: CPTII,S$GLB,, | Performed by: SURGERY

## 2024-11-05 PROCEDURE — 99999 PR PBB SHADOW E&M-EST. PATIENT-LVL IV: CPT | Mod: PBBFAC,,, | Performed by: SURGERY

## 2024-11-05 PROCEDURE — 3078F DIAST BP <80 MM HG: CPT | Mod: CPTII,S$GLB,, | Performed by: SURGERY

## 2024-11-05 PROCEDURE — 3052F HG A1C>EQUAL 8.0%<EQUAL 9.0%: CPT | Mod: CPTII,S$GLB,, | Performed by: SURGERY

## 2024-11-10 NOTE — PROGRESS NOTES
Mr Schroeder it was now about 6 weeks status post failed free flap to his right lower extremity for limb salvage.  He underwent a right below-knee amputation with Dr. Liz on 10/01/2024.    He came back to the office with a full-thickness necrosis after being discharged from an LTAC.  He was poorly set up to go home did not have outpatient wound care arranged.  He was readmitted to the hospital for debridement of his wound, wound VAC placement and discharge planning for outpatient care.  He was states that he was not seen wound care yet.  It was going to take them about 4 weeks for home health to come to him.  It was interim he was friend found a local wound care center that they went for 1 dressing change.  They did have some dysfunction with the wound VAC.  Had not been changed since discharge.    He does have a scheduled follow up with the Wound Care Center next week.    Also noticed some a moist to dry areas on his incision on his residual limb    On exam I removed his wound VAC.  The wound VAC dressing was moist.  It was no foul smell.  He had a roughly 3 x 4 cm opening with the underlying seroma capsule on muscle.  The remainder of the wound was intact with some adjacent staples.  A new line also examined his stump.  He had a stump  on for compression.  He did have some dry eschar which I left in place.  Had improved edema it was a residual limb.    It was able to find wound VAC supplies and replace the wound VAC and a obtain a good seal.  I recommend that he follow up with the Wound Care Center.  Twice weekly dressing changes there we would be adequate.  I recommend we let this area heal by secondary intention it was any further closure would be under tension and risks additional wound breakdown.  Wound it was also been open for some time at this point.    Also instructed the patient has a wound care center wanted to  of this with the happy to defer to them.    Instructed him to call  or message if he has problems arranging this outpatient wound care.    I will see him back in 4 weeks    Brandon Cueto, DO  Plastic and Reconstructive Surgery

## 2024-11-12 ENCOUNTER — OFFICE VISIT (OUTPATIENT)
Dept: ORTHOPEDICS | Facility: CLINIC | Age: 64
End: 2024-11-12
Payer: COMMERCIAL

## 2024-11-12 DIAGNOSIS — Z89.511 S/P BKA (BELOW KNEE AMPUTATION), RIGHT: Primary | ICD-10-CM

## 2024-11-12 PROCEDURE — 4010F ACE/ARB THERAPY RXD/TAKEN: CPT | Mod: CPTII,S$GLB,, | Performed by: PHYSICIAN ASSISTANT

## 2024-11-12 PROCEDURE — 3052F HG A1C>EQUAL 8.0%<EQUAL 9.0%: CPT | Mod: CPTII,S$GLB,, | Performed by: PHYSICIAN ASSISTANT

## 2024-11-12 PROCEDURE — 99024 POSTOP FOLLOW-UP VISIT: CPT | Mod: S$GLB,,, | Performed by: PHYSICIAN ASSISTANT

## 2024-11-18 NOTE — PROGRESS NOTES
"  Principal Orthopedic Problem:   63 yo M, ground level fall 07/18/2  left intra-articular distal radius fracture               Reduced and splinted              07/19/24 ([facility]ner) ORIF distal radius, right    Right trimalleolar ankle fracture              07/18/24 ([facility]autner) external fixation ankle trimal, right              07/26/24 ( [facility]ner) ORIF ankle, right removal fo external fixation device.    09/06/24: ( [facility]ner wound breakdown, see in ED    I and D and wound vac              09/09/ 24: ( [facility]ner) I and D   09/20/24:( [facility]ner) I and D, wound vac   09/23/24 ( [facility]ner) I and D replacement wound vac   09/26/24 ( Cueto) free flap   10/01/24([facility]ner) amputation, right        Relevant Medical History: according to chart     PMHX: DM, neuropathy of foot      Lives at home with himself, he is here in a condo on contract,   Occupation:  with [employer]   Prior to injury  Independent community ambulator, gait aids   Denies history of stroke, heart attack, cancer, blood clot,   +diabetes A1C 9.9  Denies tobacco use  Denied chronic pain medication use prior to injury  Walks w/out assisted devices at baseline.      Estimated body mass index is 33.78 kg/m² as calculated from the following:    Height as of 7/20/24: 5' 5" (1.651 m).    Weight as of 7/20/24: 92.1 kg (203 lb).      Mr. Schroeder is here today for a post-operative visit     Interval History:  he reports that he is doing ok.   he is at home. he is participating in PT/OT.   He is getting wound care at local wound care center. Treating both his back and his stump.   Pain is controlled.  he is  taking pain medication.    he denies fever, chills, and sweats .      Physical exam:    Patient arrives to exam room: wheelchair.  Patient is  accompanied     dressing in place, was using  sock on hold due to wound.           RADS: All pertinent images were reviewed by myself:   None done today     Assessment:  Post-op visit ( 6 weekS)   "   Plan:  Current care, treatment plan, precautions, activity level/ modifications, limitations, rehabilitation exercises and proposed future treatment were discussed with the patient. We discussed the need to monitor for changes in symptoms and condition and report them to the physician.  Discussed importance of compliance with all appointments and follow up examinations.      WOUND CARE :  - per wound care.   -Patient was advised to monitor wound closely and multiple times daily for any problems. Call clinic immediately or report to ED for immediate medical attention for any complications including reopening of wound, drainage, purulence, redness, streaking, odor, pain out of proportion, fever, chills, etc.   -- If there are signs of infection, please call Ortho Clinic [phone] for further instructions and to make appt to be seen.   - Dicussed issued with a non healed wound and  sock.   - discussed proper nutritions and blood sugar control.       ACTIVITY:   - light             Range of motion               NWB, pending woudn healing.                 -PT/OT, , Patient is responsible to establish and continue care     DVT PROPHYLAXIS:   - ASA 81 mg bid     FOLLOW UP:   - Patient will follow up in the clinic in 6 weeks, sooner if any concerns.  - X-ray of his ankle and wrist is needed. NWB OOB        If there are any questions prior to scheduled follow up, the patient was instructed to contact the office

## 2024-12-10 ENCOUNTER — OFFICE VISIT (OUTPATIENT)
Dept: PLASTIC SURGERY | Facility: CLINIC | Age: 64
End: 2024-12-10
Payer: COMMERCIAL

## 2024-12-10 ENCOUNTER — OFFICE VISIT (OUTPATIENT)
Dept: ORTHOPEDICS | Facility: CLINIC | Age: 64
End: 2024-12-10
Payer: COMMERCIAL

## 2024-12-10 VITALS — DIASTOLIC BLOOD PRESSURE: 78 MMHG | HEART RATE: 65 BPM | SYSTOLIC BLOOD PRESSURE: 180 MMHG

## 2024-12-10 DIAGNOSIS — Z89.511 S/P BKA (BELOW KNEE AMPUTATION), RIGHT: Primary | ICD-10-CM

## 2024-12-10 DIAGNOSIS — Z09 SURGERY FOLLOW-UP: Primary | ICD-10-CM

## 2024-12-10 PROCEDURE — 1159F MED LIST DOCD IN RCRD: CPT | Mod: CPTII,S$GLB,, | Performed by: SURGERY

## 2024-12-10 PROCEDURE — 4010F ACE/ARB THERAPY RXD/TAKEN: CPT | Mod: CPTII,S$GLB,, | Performed by: PHYSICIAN ASSISTANT

## 2024-12-10 PROCEDURE — 3077F SYST BP >= 140 MM HG: CPT | Mod: CPTII,S$GLB,, | Performed by: SURGERY

## 2024-12-10 PROCEDURE — 99999 PR PBB SHADOW E&M-EST. PATIENT-LVL III: CPT | Mod: PBBFAC,,, | Performed by: PHYSICIAN ASSISTANT

## 2024-12-10 PROCEDURE — 99024 POSTOP FOLLOW-UP VISIT: CPT | Mod: S$GLB,,, | Performed by: SURGERY

## 2024-12-10 PROCEDURE — 3052F HG A1C>EQUAL 8.0%<EQUAL 9.0%: CPT | Mod: CPTII,S$GLB,, | Performed by: PHYSICIAN ASSISTANT

## 2024-12-10 PROCEDURE — 99024 POSTOP FOLLOW-UP VISIT: CPT | Mod: S$GLB,,, | Performed by: PHYSICIAN ASSISTANT

## 2024-12-10 PROCEDURE — 3078F DIAST BP <80 MM HG: CPT | Mod: CPTII,S$GLB,, | Performed by: SURGERY

## 2024-12-10 PROCEDURE — 99999 PR PBB SHADOW E&M-EST. PATIENT-LVL II: CPT | Mod: PBBFAC,,, | Performed by: SURGERY

## 2024-12-10 NOTE — PROGRESS NOTES
"  Principal Orthopedic Problem:   63 yo M, ground level fall 07/18/2  left intra-articular distal radius fracture               Reduced and splinted              07/19/24 ([facility]) ORIF distal radius, right    Right trimalleolar ankle fracture              07/18/24 ([facility]ner) external fixation ankle trimal, right              07/26/24 ( [facility]) ORIF ankle, right removal fo external fixation device.    09/06/24: ( [facility]ner wound breakdown, see in ED    I and D and wound vac              09/09/ 24: ( [facility]ner) I and D   09/20/24:( [facility]ner) I and D, wound vac   09/23/24 ( [facility]) I and D replacement wound vac   09/26/24 ( Cueto) free flap   10/01/24([facility]) amputation, right        Relevant Medical History: according to chart     PMHX: DM, neuropathy of foot      Lives at home with himself, he is here in a condo on contract,   Occupation:  with [employer]   Prior to injury  Independent community ambulator, gait aids   Denies history of stroke, heart attack, cancer, blood clot,   +diabetes A1C 9.9  Denies tobacco use  Denied chronic pain medication use prior to injury  Walks w/out assisted devices at baseline.      Estimated body mass index is 33.78 kg/m² as calculated from the following:    Height as of 7/20/24: 5' 5" (1.651 m).    Weight as of 7/20/24: 92.1 kg (203 lb).      Mr. Schroeder is here today for a post-operative visit     Interval History:  he reports that he is doing ok.   he is at home. he is participating in PT/OT.   He is getting wound care at local wound care center. Treating both his back and his stump.   At this time stump is being treated with antibiotic wash and santyl. He is also getting IV abx.   Pain is controlled.  he is  taking pain medication.  Reports phantom pain and shooting at night.    he denies fever, chills, and sweats .      Physical exam:    Patient arrives to exam room: wheelchair.  Patient is  accompanied     dressing in place, was using  sock on hold due to " wound.                 RADS: All pertinent images were reviewed by myself:   None done today     Assessment:  Post-op visit      Plan:  Current care, treatment plan, precautions, activity level/ modifications, limitations, rehabilitation exercises and proposed future treatment were discussed with the patient. We discussed the need to monitor for changes in symptoms and condition and report them to the physician.  Discussed importance of compliance with all appointments and follow up examinations.      WOUND CARE :  - per wound care.   -Patient was advised to monitor wound closely and multiple times daily for any problems. Call clinic immediately or report to ED for immediate medical attention for any complications including reopening of wound, drainage, purulence, redness, streaking, odor, pain out of proportion, fever, chills, etc.   -- If there are signs of infection, please call Ortho Clinic [phone] for further instructions and to make appt to be seen.   - Dicussed issued with a non healed wound and  sock.   - discussed proper nutritions and blood sugar control. ( Reports in 150's to 180's)      ACTIVITY:   - light             Range of motion               NWB, pending wound healing.                 -PT/OT, , Patient is responsible to establish and continue care     DVT PROPHYLAXIS:   - ASA 81 mg bid     FOLLOW UP:   - Patient will follow up in the clinic same day he sees plastic surgery follow up./ , sooner if any concerns.  - X-ray of his ankle and wrist is needed. NWB OOB        If there are any questions prior to scheduled follow up, the patient was instructed to contact the office

## 2024-12-17 NOTE — PROGRESS NOTES
Cortes Schroeder presents to [facility] for a follow up visit status post failed free flap to his right lower extremity for limb salvage.  He underwent a right below-knee amputation with Dr. Liz on 10/01/2024.  His left upper back donor site has a slow healing wound that is being treated with a wound vac and managed by wound care in [place]. He notes significant improvement in the size and depth of his wound. He hasn't had any issues with the wound vac. They are applying a topical antibiotic wash with vac changes twice weekly. He also received parenteral antibiotics following his most recent hospitalization per ID. He is without any complaints today.    PHYSICAL EXAMINATION  3cm open wound of left parascapular region. Tunnels several cm into a narrow tract. Superficial wound has healthy granulation tissue in wound base.      ASSESSMENT/PLAN  Cortes Schroeder returns today for management of his left upper back wound  - Wound vac changed at bedside  - Advised to continue wound care with wound center  - Follow up 6 weeks      All questions were answered. The patient was advised to contact the clinic with any questions or concerns prior to their next visit.       Irma Olson PA-C  Plastic and Reconstructive Surgery

## 2025-01-08 NOTE — TELEPHONE ENCOUNTER
OB return  Tana Healy is a 34 y.o. female   with 38w2d IUP with Estimated Date of Delivery: 25 presenting to the clinic as a routine OB with growth u/s.     Indications for Growth U/s: DARCY: 25  U/s findings: EFW 7#8oz, Overall: 61%, AC: 61%, HC: 43%, DARCY: 15.79cm, MVP: 4.83cm, FHR: 150bpm  Anterior fibroid visualized measuring 6.7 x 4.3 x 7.2cm  Vertex position seen  Dr. Hennessy also reviewed u/s findings today.     She reports experiencing lower abdominal pain \"when walking around\" this morning. She denies feeling regular contractions, leakage of fluid or vaginal bleeding and reports active fetal movement.     Reports experiencing headache this morning however, states \"I did not sleep well last night.\"   She denies blurred vision or RUQ pain.     Problem list reviewed and updated    Pregnancy complicated by:  1. Uterine fibroid: 5 cm fundal intramural fibroid noted on dating scan. 24 [facility]: Left lateral wall, 6.3 x 5.2 x 5.5 cm. 24 [facility]: Stable at 5.9 cm. 24 [facility]: Fibroid stable.      2. Vitamin B12 deficiency: Receiving B12 injections every 2 weeks, managed by PCP.  24: Vitamin B12- 754     3. Hx oligohydramnios     4. Hx GHTN: W/ G1  Baseline HELLP labs normal  PC 0.1. 24 [facility]: Reports not currently taking LDA, recommendations reviewed.     5. PVC: In  was taking Adderall and experiencing chest pressure. Was seen by Cardiology with negative workup.  No recent Sx     6. Hx  section: W/G1 due to Breech presentation. Plans on repeat     7. Obesity: Pregravid BMI 36, Early HgA1c 5.1%. 24 [facility]: Reassuring fetal status. Growth today appropriate EFW appropriate AC; DARCY- 25.2 cm, BPP- 8/8. For full details review formal ultrasound report.      8. At risk for cancer: APPROVED, Desire for RR salpingectomy at time of CS  Pregravid BMI > 35 = RF  Email sent  to ethics for approval, 12/10 approval email received.     Physical Examination:  /82   Wt 103.4  Pt requesting Rx refill listed below. Notified pt only able to refill pravastatin. Note need office visit to advise of new control substance medication and new Rx that Dr Acosta did not prescribe before. Need visit to discuss refill. Note pt need follow up as well. Pt sounded upset - pt stated it is not new prescriptions but have been taking it for a while . Notified pt that I understand but need follow up due to Dr Acosta never prescribe. Note need visit to be advise of refill.    Home

## 2025-01-20 ENCOUNTER — TELEPHONE (OUTPATIENT)
Dept: PLASTIC SURGERY | Facility: CLINIC | Age: 65
End: 2025-01-20
Payer: COMMERCIAL

## 2025-01-20 NOTE — TELEPHONE ENCOUNTER
Contacted patient to reschedule appointment due to weather closures. He had already canceled his appt this morning on the portal for the same concern. He states that his wound is improving and he continues wound vac therapy and wound care. We will be in touch to reschedule his appt once the weather clears up.

## 2025-01-27 ENCOUNTER — TELEPHONE (OUTPATIENT)
Dept: ORTHOPEDICS | Facility: CLINIC | Age: 65
End: 2025-01-27
Payer: COMMERCIAL

## 2025-01-27 ENCOUNTER — TELEPHONE (OUTPATIENT)
Dept: PLASTIC SURGERY | Facility: CLINIC | Age: 65
End: 2025-01-27
Payer: COMMERCIAL

## 2025-01-27 NOTE — TELEPHONE ENCOUNTER
Called pt informing about upcoming appt change w/. pt states he can not come due to a short notice of change. Pt states he's coming from [place] and can not get a ride or take off of work this soon. All questions and concerns addressed. Pt verbalizes understanding.

## 2025-01-27 NOTE — TELEPHONE ENCOUNTER
Spoke with pt. Pt stated that he will try to come 1/28/25 at 0800. Patient states verbal understanding and has no further questions.

## 2025-01-27 NOTE — TELEPHONE ENCOUNTER
Called and spoke to pt about DR Cueto's appt for tomorrow at 1pm at  - Pt stated he would prefer to come another day- and coord the appt 2/7 w the Ortho appt; explained previous appt was on 2/7 w YARELIS Solis - Dr Cueto requested for him to be seen tomorrow - pt agreeable - states is a bit of an inconvenience to see 2 providers on diff days

## 2025-01-27 NOTE — TELEPHONE ENCOUNTER
----- Message from Minh sent at 1/27/2025 12:32 PM CST -----  Regarding: Reschedule  Contact: [phone]  Pt is calling in ref to rescheduling  his 2/7 appt to 1/28. He says provider Rom rescheduled original appt.  He is 3 hrs away and would like both appts scheduled same day. Patient Requesting Call Back @  [phone]

## 2025-01-28 ENCOUNTER — OFFICE VISIT (OUTPATIENT)
Dept: ORTHOPEDICS | Facility: CLINIC | Age: 65
End: 2025-01-28
Payer: COMMERCIAL

## 2025-01-28 ENCOUNTER — OFFICE VISIT (OUTPATIENT)
Dept: PLASTIC SURGERY | Facility: CLINIC | Age: 65
End: 2025-01-28
Payer: COMMERCIAL

## 2025-01-28 VITALS
WEIGHT: 203.94 LBS | SYSTOLIC BLOOD PRESSURE: 188 MMHG | HEART RATE: 86 BPM | HEIGHT: 65 IN | DIASTOLIC BLOOD PRESSURE: 91 MMHG | BODY MASS INDEX: 33.98 KG/M2

## 2025-01-28 DIAGNOSIS — Z09 SURGERY FOLLOW-UP: Primary | ICD-10-CM

## 2025-01-28 DIAGNOSIS — Z89.511 S/P BKA (BELOW KNEE AMPUTATION), RIGHT: Primary | ICD-10-CM

## 2025-01-28 PROCEDURE — 99999 PR PBB SHADOW E&M-EST. PATIENT-LVL I: CPT | Mod: PBBFAC,,, | Performed by: PHYSICIAN ASSISTANT

## 2025-01-28 PROCEDURE — 99213 OFFICE O/P EST LOW 20 MIN: CPT | Mod: S$GLB,,, | Performed by: PHYSICIAN ASSISTANT

## 2025-01-28 PROCEDURE — 3052F HG A1C>EQUAL 8.0%<EQUAL 9.0%: CPT | Mod: CPTII,S$GLB,, | Performed by: PHYSICIAN ASSISTANT

## 2025-01-28 RX ORDER — TRAMADOL HYDROCHLORIDE 100 MG/1
1 TABLET, COATED ORAL DAILY PRN
COMMUNITY
Start: 2024-10-21

## 2025-01-28 NOTE — PROGRESS NOTES
Cortes Schroeder presents to [facility] Surgery Clinic for a follow up visit status post failed parascapular free flap with chronic wound of left upper back donor site. He continues to follow with wound care managing his wound vac which is changed twice weekly on Tuesday and Friday.  Using topical antibiotic wash on gauze to pack the wound prior to application of black sponge.   He denies fever, chills, nausea, vomiting or other systemic signs of infection.       ROS - negative, other than stated above    PHYSICAL EXAMINATION  Vitals:    01/28/25 1311   BP: (!) 188/91   Pulse: 86       Gen: NAD, appears stated age  Neuro: normal without focal findings, mental status and speech normal  HEENT: NCAT, neck supple, PEERL  CV: RRR  Pulm: Breathing non-labored, chest wall movement equal bilaterally   Breast: not examined  Abdomen: soft, nontender, no guarding  Gu: genitalia not examined  Extremity: Right BKA  Skin: 3cm open wound of left parascapular region. Tunnels several cm into a narrow tract. Superficial wound has healthy granulation tissue in wound base.  Psych: oriented to time, place and person      ASSESSMENT/PLAN  65 y.o. male s/p debridement of chronic left upper back wound    Continue wound vac changes for wound care    All questions were answered. The patient was advised to contact the clinic with any questions or concerns prior to their next visit.   RTC x 6 week(s), appointment scheduled.    Irma Olson PA-C  Plastic and Reconstructive Surgery      I spent a total of 30 minutes on the day of the visit.This includes face to face time and non-face to face time preparing to see the patient (eg, review of tests), obtaining and/or reviewing separately obtained history, documenting clinical information in the electronic or other health record, independently interpreting results and communicating results to the patient/family/caregiver, or care coordinator.

## 2025-01-28 NOTE — PROGRESS NOTES
"  Principal Orthopedic Problem:   63 yo M, ground level fall 07/18/2  left intra-articular distal radius fracture               Reduced and splinted              07/19/24 ([facility]) ORIF distal radius, right    Right trimalleolar ankle fracture              07/18/24 ([facility]ner) external fixation ankle trimal, right              07/26/24 ( [facility]) ORIF ankle, right removal fo external fixation device.    09/06/24: ( [facility]ner wound breakdown, see in ED    I and D and wound vac              09/09/ 24: ( [facility]ner) I and D   09/20/24:( [facility]ner) I and D, wound vac   09/23/24 ( [facility]) I and D replacement wound vac   09/26/24 ( Cueto) free flap   10/01/24([facility]) amputation, right        Relevant Medical History: according to chart     PMHX: DM, neuropathy of foot      Lives at home with himself, he is here in a condo on contract,   Occupation:  with [employer]   Prior to injury  Independent community ambulator, gait aids   Denies history of stroke, heart attack, cancer, blood clot,   +diabetes A1C 9.9  Denies tobacco use  Denied chronic pain medication use prior to injury  Walks w/out assisted devices at baseline.      Estimated body mass index is 33.78 kg/m² as calculated from the following:    Height as of 7/20/24: 5' 5" (1.651 m).    Weight as of 7/20/24: 92.1 kg (203 lb).      Mr. Schroeder is here today for a post-operative visit     Interval History:  he reports that he is doing ok.   he is at home. he is no longer participating in PT/OT.   He is getting wound care at local wound care center. Treating both his back and his stump. Stump doing very well. Treating with inna.    Did have one fall since last visit.   Reports some tightness in his fingers.  Pain is controlled.  he is  taking pain medication.  Reports phantom pain and shooting at night.    he denies fever, chills, and sweats .         Physical exam:    Patient arrives to exam room: wheelchair.              RADS: All pertinent images were " reviewed by myself:   None done today     Assessment:  Post-op visit      Plan:  Current care, treatment plan, precautions, activity level/ modifications, limitations, rehabilitation exercises and proposed future treatment were discussed with the patient. We discussed the need to monitor for changes in symptoms and condition and report them to the physician.  Discussed importance of compliance with all appointments and follow up examinations.      WOUND CARE :  - per wound care.   - ok  sock   -Patient was advised to monitor wound closely and multiple times daily for any problems. Call clinic immediately or report to ED for immediate medical attention for any complications including reopening of wound, drainage, purulence, redness, streaking, odor, pain out of proportion, fever, chills, etc.   -- If there are signs of infection, please call Ortho Clinic [phone] for further instructions and to make appt to be seen.   - Dicussed issued with a non healed wound and  sock.   - discussed proper nutritions and blood sugar control. ( Reports in 150's to 180's)      ACTIVITY:   - light             Range of motion               NWB, pending wound healing.                 -PT/OT, , Patient is responsible to establish and continue care     DVT PROPHYLAXIS:   - ASA 81 mg bid     FOLLOW UP:   - Patient will follow up in the clinic same day he sees plastic surgery follow up./ , sooner if any concerns.  - X-ray of his ankle and wrist is needed. NWB OOB        If there are any questions prior to scheduled follow up, the patient was instructed to contact the office

## 2025-02-10 ENCOUNTER — TELEPHONE (OUTPATIENT)
Dept: ORTHOPEDICS | Facility: CLINIC | Age: 65
End: 2025-02-10
Payer: COMMERCIAL

## 2025-02-10 NOTE — TELEPHONE ENCOUNTER
Pt requesting to be seen on 03/11, unable to schedule. Pt states that he will just cancel seeing her all together because he has sent multiple messages to get something scheduled. Message sent to SS to assist with scheduling. Pt states that he hopes Andreina can get something scheduled.

## 2025-02-11 ENCOUNTER — PATIENT MESSAGE (OUTPATIENT)
Dept: ORTHOPEDICS | Facility: CLINIC | Age: 65
End: 2025-02-11
Payer: COMMERCIAL

## 2025-03-05 ENCOUNTER — PATIENT MESSAGE (OUTPATIENT)
Dept: ORTHOPEDICS | Facility: CLINIC | Age: 65
End: 2025-03-05
Payer: COMMERCIAL

## 2025-03-06 NOTE — ANESTHESIA POSTPROCEDURE EVALUATION
Anesthesia Post Evaluation    Patient: Cortes Schroeder    Procedure(s) Performed: Procedure(s) (LRB):  ORIF, ANKLE (Right)  REMOVAL, EXTERNAL FIXATION DEVICE (Right)  FIXATION, SYNDESMOSIS, ANKLE (Right)    Final Anesthesia Type: general      Patient location during evaluation: PACU  Patient participation: Yes- Able to Participate  Level of consciousness: awake and alert  Post-procedure vital signs: reviewed and stable  Pain management: adequate  Airway patency: patent    PONV status at discharge: No PONV  Anesthetic complications: no      Cardiovascular status: hemodynamically stable  Respiratory status: unassisted, spontaneous ventilation and room air  Hydration status: euvolemic  Follow-up not needed.          Vitals Value Taken Time   /63 07/26/24 1345   Temp 36.6 °C (97.9 °F) 07/26/24 1345   Pulse 84 07/26/24 1345   Resp 20 07/26/24 1345   SpO2 95 % 07/26/24 1345         No case tracking events are documented in the log.      Pain/Judy Score: No data recorded         yes

## 2025-03-11 ENCOUNTER — OFFICE VISIT (OUTPATIENT)
Dept: ORTHOPEDICS | Facility: CLINIC | Age: 65
End: 2025-03-11
Payer: COMMERCIAL

## 2025-03-11 ENCOUNTER — OFFICE VISIT (OUTPATIENT)
Dept: PLASTIC SURGERY | Facility: CLINIC | Age: 65
End: 2025-03-11
Payer: COMMERCIAL

## 2025-03-11 VITALS — SYSTOLIC BLOOD PRESSURE: 130 MMHG | DIASTOLIC BLOOD PRESSURE: 74 MMHG | HEART RATE: 90 BPM

## 2025-03-11 VITALS — HEIGHT: 65 IN | WEIGHT: 203.94 LBS | BODY MASS INDEX: 33.98 KG/M2

## 2025-03-11 DIAGNOSIS — S82.851A CLOSED TRIMALLEOLAR FRACTURE OF RIGHT ANKLE, INITIAL ENCOUNTER: Primary | ICD-10-CM

## 2025-03-11 DIAGNOSIS — Z09 SURGERY FOLLOW-UP: Primary | ICD-10-CM

## 2025-03-11 PROCEDURE — 3078F DIAST BP <80 MM HG: CPT | Mod: CPTII,S$GLB,, | Performed by: SURGERY

## 2025-03-11 PROCEDURE — 3075F SYST BP GE 130 - 139MM HG: CPT | Mod: CPTII,S$GLB,, | Performed by: SURGERY

## 2025-03-11 PROCEDURE — 99999 PR PBB SHADOW E&M-EST. PATIENT-LVL III: CPT | Mod: PBBFAC,,, | Performed by: PHYSICIAN ASSISTANT

## 2025-03-11 PROCEDURE — 3052F HG A1C>EQUAL 8.0%<EQUAL 9.0%: CPT | Mod: CPTII,S$GLB,, | Performed by: SURGERY

## 2025-03-11 PROCEDURE — 4010F ACE/ARB THERAPY RXD/TAKEN: CPT | Mod: CPTII,S$GLB,, | Performed by: SURGERY

## 2025-03-11 PROCEDURE — 4010F ACE/ARB THERAPY RXD/TAKEN: CPT | Mod: CPTII,S$GLB,, | Performed by: PHYSICIAN ASSISTANT

## 2025-03-11 PROCEDURE — 99999 PR PBB SHADOW E&M-EST. PATIENT-LVL III: CPT | Mod: PBBFAC,,, | Performed by: SURGERY

## 2025-03-11 PROCEDURE — 99213 OFFICE O/P EST LOW 20 MIN: CPT | Mod: S$GLB,,, | Performed by: PHYSICIAN ASSISTANT

## 2025-03-11 PROCEDURE — 99214 OFFICE O/P EST MOD 30 MIN: CPT | Mod: S$GLB,,, | Performed by: SURGERY

## 2025-03-11 PROCEDURE — 3052F HG A1C>EQUAL 8.0%<EQUAL 9.0%: CPT | Mod: CPTII,S$GLB,, | Performed by: PHYSICIAN ASSISTANT

## 2025-03-11 PROCEDURE — 3008F BODY MASS INDEX DOCD: CPT | Mod: CPTII,S$GLB,, | Performed by: PHYSICIAN ASSISTANT

## 2025-03-11 PROCEDURE — 1159F MED LIST DOCD IN RCRD: CPT | Mod: CPTII,S$GLB,, | Performed by: SURGERY

## 2025-03-11 RX ORDER — GLUCOSAMINE SULFATE 500 MG
1000 TABLET ORAL DAILY
COMMUNITY

## 2025-03-13 NOTE — PROGRESS NOTES
Cortes Schroeder presents to [facility] for a follow up visit status post failed parascapular free flap with chronic wound of left upper back donor site. He continues to follow with wound care managing his wound vac which is changed twice weekly on Tuesday and Friday.  Using topical antibiotic wash on gauze to pack the wound prior to application of black sponge. There is a narrow area of tunneling that has been slow to heal - wound care recommended packing with white sponge.  He denies fever, chills, nausea, vomiting or other systemic signs of infection.         ROS - negative, other than stated above     PHYSICAL EXAMINATION      Vitals:     01/28/25 1311   BP: (!) 188/91   Pulse: 86         Gen: NAD, appears stated age  Neuro: normal without focal findings, mental status and speech normal  HEENT: NCAT, neck supple, PEERL  CV: RRR  Pulm: Breathing non-labored, chest wall movement equal bilaterally   Breast: not examined  Abdomen: soft, nontender, no guarding  Gu: genitalia not examined  Extremity: Right BKA  Skin: 3cm open wound of left parascapular region. Tunnels several cm into a narrow tract. Superficial wound has healthy granulation tissue in wound base.  Psych: oriented to time, place and person        ASSESSMENT/PLAN  65 y.o. male s/p debridement of chronic left upper back wound     His wound continues to gradually improve with wound vac therapy. He is followed by wound care. He lives out of town. We discussed allowing wound care to manage this until healed. He is agreeable with this plan and will follow up as needed. Vac changed in clinic today.     Irma Olson PA-C  Plastic and Reconstructive Surgery        I spent a total of 30 minutes on the day of the visit.This includes face to face time and non-face to face time preparing to see the patient (eg, review of tests), obtaining and/or reviewing separately obtained history, documenting clinical information in the electronic or other  health record, independently interpreting results and communicating results to the patient/family/caregiver, or care coordinator.

## 2025-04-14 NOTE — PROGRESS NOTES
"  Principal Orthopedic Problem:   63 yo M, ground level fall 07/18/2  left intra-articular distal radius fracture               Reduced and splinted              07/19/24 ([facility]) ORIF distal radius, right    Right trimalleolar ankle fracture              07/18/24 ([facility]) external fixation ankle trimal, right              07/26/24 ( [facility]) ORIF ankle, right removal fo external fixation device.    09/06/24: ( [facility] wound breakdown, see in ED    I and D and wound vac              09/09/ 24: ( [facility]) I and D   09/20/24:( [facility]) I and D, wound vac   09/23/24 ( [facility]) I and D replacement wound vac   09/26/24 ( [facility]) free flap   10/01/24([facility]) amputation, right        Relevant Medical History: according to chart     PMHX: DM, neuropathy of foot      Lives at home with himself, he is here in a condo on contract,   Occupation:  with [employer]   Prior to injury  Independent community ambulator, gait aids   Denies history of stroke, heart attack, cancer, blood clot,   +diabetes A1C 9.9  Denies tobacco use  Denied chronic pain medication use prior to injury  Walks w/out assisted devices at baseline.      Estimated body mass index is 33.78 kg/m² as calculated from the following:    Height as of 7/20/24: 5' 5" (1.651 m).    Weight as of 7/20/24: 92.1 kg (203 lb).      Mr. Schroeder is here today for a post-operative visit     Interval History:  he reports that he is doing ok.   he is at home. he is no longer participating in PT/OT.   He is getting wound care at local wound care center. Treating both his back and his stump. Stump doing very well. Treating with inna.   Pain is controlled.  he is  taking pain medication.  Reports phantom pain and shooting at night.    he denies fever, chills, and sweats .         Physical exam:    Patient arrives to exam room: wheelchair.            RADS: All pertinent images were reviewed by myself:   None done today     Assessment:  Post-op visit    "   Plan:  Current care, treatment plan, precautions, activity level/ modifications, limitations, rehabilitation exercises and proposed future treatment were discussed with the patient. We discussed the need to monitor for changes in symptoms and condition and report them to the physician.  Discussed importance of compliance with all appointments and follow up examinations.      WOUND CARE :  - per wound care.   - ok  sock   -Patient was advised to monitor wound closely and multiple times daily for any problems. Call clinic immediately or report to ED for immediate medical attention for any complications including reopening of wound, drainage, purulence, redness, streaking, odor, pain out of proportion, fever, chills, etc.   -- If there are signs of infection, please call [facility] [phone] for further instructions and to make appt to be seen.   - Dicussed issued with a non healed wound and  sock.   - discussed proper nutritions and blood sugar control. ( Reports in 150's to 180's)      ACTIVITY:   - light             Range of motion               NWB, pending wound healing.                 -PT/OT, , Patient is responsible to establish and continue care     DVT PROPHYLAXIS:   - ASA 81 mg bid     FOLLOW UP:   - Patient will follow up in the clinic same day he sees plastic surgery follow up./ , sooner if any concerns.  - X-ray of his ankle and wrist is needed. NWB OOB        If there are any questions prior to scheduled follow up, the patient was instructed to contact the office     Patient is a new right BK amputee and has a strong desire to ambulate again. He does not have any commodities the will prevent him from using a prosthesis. He has good strength, balance and range of motion. I find the he is currently a level K1 ambulator and has the potential to be a level K2 ambulator once fit with prosthesis. Prior to his amputation he performed all ambulation unassisted. ?his ADLs included: cooking,  cleaning his home, walking on uneven yard, driving and walking jenna community obstacles like curbs and steps. Fitting him with a prosthesis will allow him to remain ambulatory, get exercise from walking and complete these ADLs. I agree with the [facility] evaluation for fitting.

## 2025-06-01 NOTE — ANESTHESIA POSTPROCEDURE EVALUATION
Problem: Prexisting or High Potential for Compromised Skin Integrity  Goal: Skin integrity is maintained or improved  Description: INTERVENTIONS:  - Identify patients at risk for skin breakdown  - Assess and monitor skin integrity including under and around medical devices   - Assess and monitor nutrition and hydration status  - Monitor labs  - Assess for incontinence   - Turn and reposition patient  - Assist with mobility/ambulation  - Relieve pressure over garry prominences   - Avoid friction and shearing  - Provide appropriate hygiene as needed including keeping skin clean and dry  - Evaluate need for skin moisturizer/barrier cream  - Collaborate with interdisciplinary team  - Patient/family teaching  - Consider wound care consult    Assess:  - Review Alec scale daily  - Clean and moisturize skin every shift  - Inspect skin when repositioning, toileting, and assisting with ADLS  - Assess under medical devices such as masimos every 4  - Assess extremities for adequate circulation and sensation     Bed Management:  - Have minimal linens on bed & keep smooth, unwrinkled  - Change linens as needed when moist or perspiring  - Avoid sitting or lying in one position for more than 2 hours while in bed?Keep HOB at 30 degrees   - Toileting:  - Offer bedside commode  - Assess for incontinence every 2  - Use incontinent care products after each incontinent episode such as chucks    Activity:  - Mobilize patient 2 times a day  - Encourage activity and walks on unit  - Encourage or provide ROM exercises   - Turn and reposition patient every 2 Hours  - Use appropriate equipment to lift or move patient in bed  - Instruct/ Assist with weight shifting every 2 when out of bed in chair  - Consider limitation of chair time 2 hour intervals    Skin Care:  - Avoid use of baby powder, tape, friction and shearing, hot water or constrictive clothing  - Relieve pressure over bony prominences using pillows  - Do not massage red bony  Anesthesia Post Evaluation    Patient: Cortes Schroeder    Procedure(s) Performed: Procedure(s) (LRB):  REPLACEMENT, WOUND VAC; white & black sponge (Right)  REMOVAL, HARDWARE, ANKLE (Right)    Final Anesthesia Type: general      Patient location during evaluation: PACU  Patient participation: Yes- Able to Participate  Level of consciousness: awake and alert and oriented  Post-procedure vital signs: reviewed and stable  Pain management: adequate  Airway patency: patent    PONV status at discharge: No PONV  Anesthetic complications: no      Cardiovascular status: hemodynamically stable  Respiratory status: unassisted  Hydration status: euvolemic  Follow-up not needed.              Vitals Value Taken Time   /83 09/23/24 1231   Temp 36.5 °C (97.7 °F) 09/23/24 1215   Pulse 89 09/23/24 1235   Resp 23 09/23/24 1235   SpO2 98 % 09/23/24 1235   Vitals shown include unfiled device data.      Event Time   Out of Recovery 09/23/2024 12:22:00         Pain/Judy Score: Pain Rating Prior to Med Admin: 4 (9/23/2024  6:08 AM)  Judy Score: 10 (9/23/2024 12:21 PM)           areas    Next Steps:  - Teach patient strategies to minimize risks such as shift weight  - Consider consults to  interdisciplinary teams such as PT  Outcome: Progressing

## (undated) DEVICE — INFLATOR ENCORE 26 BLLN INFL

## (undated) DEVICE — VISIPAQUE CONTRAST 320MG/100ML

## (undated) DEVICE — ELECTRODE REM PLYHSV RETURN 9

## (undated) DEVICE — KIT CUSTOM WASTE BAG

## (undated) DEVICE — DRAPE C-ARMOR EQUIPMENT COVER

## (undated) DEVICE — MARKER FN REG DUAL UTIL RULER

## (undated) DEVICE — CATH IV JELCO SAFETY 18GA JJ30

## (undated) DEVICE — PROBE FLOW DOPPLER

## (undated) DEVICE — CONNECTOR Y VAC TRAC

## (undated) DEVICE — BLADE SURG CARBON STEEL #10

## (undated) DEVICE — STAPLER SKIN ROTATING HEAD

## (undated) DEVICE — CONNECTOR Y T.R.A.C.

## (undated) DEVICE — BRACE WRIST FOREARM L

## (undated) DEVICE — SUT VICRYL PLUS 2-0 CT1 18

## (undated) DEVICE — DRAPE STERI INSTRUMENT 1018

## (undated) DEVICE — KWIRE NTHRD 1.25X150MM
Type: IMPLANTABLE DEVICE | Site: ANKLE | Status: NON-FUNCTIONAL
Removed: 2024-07-26

## (undated) DEVICE — 2.5MM DRILL BIT

## (undated) DEVICE — KIT INTRO MICRO NIT VSI 4FR

## (undated) DEVICE — SHEATH INTRODUCER 5F 10CM

## (undated) DEVICE — DRAPE THREE-QTR REINF 53X77IN

## (undated) DEVICE — Device

## (undated) DEVICE — SUT MCRYL PLUS 4-0 PS2 27IN

## (undated) DEVICE — SEE MEDLINE ITEM 152572

## (undated) DEVICE — CATH SUCTION 10FR

## (undated) DEVICE — SPLINT PLASTER F.S 4INX15IN

## (undated) DEVICE — BLADE SURG #15 CARBON STEEL

## (undated) DEVICE — SPONGE COTTON TRAY 4X4IN

## (undated) DEVICE — TOURNIQUET SB QC DP 18X4IN

## (undated) DEVICE — SUT SILK BLK BR. 0 8-18

## (undated) DEVICE — PENCIL ROCKER SWITCH 10FT CORD

## (undated) DEVICE — CATH ANGIO SOFT VU 5FR 65CM

## (undated) DEVICE — VISE RADIFOCUS MULTI TORQUE

## (undated) DEVICE — SPONGE LAP 18X18 PREWASHED

## (undated) DEVICE — DRAPE T EXTRM SURG 121X128X90

## (undated) DEVICE — COVER PROBE US 5.5X58L NON LTX

## (undated) DEVICE — CARTRIDGE MICROCLIP SFINE BLUE

## (undated) DEVICE — SCRUB HIBICLENS 4% CHG 4OZ

## (undated) DEVICE — DRESSING AQUACEL RIBBON 2X45CM

## (undated) DEVICE — APPLIER CLIP LIAGCLIP 9.375IN

## (undated) DEVICE — DRAPE C-ARM ELAS CLIP 42X120IN

## (undated) DEVICE — DRAPE HALF SURGICAL 40X58IN

## (undated) DEVICE — KIT PREVENA PLUS

## (undated) DEVICE — IRRIGATION SET Y-TYPE TUR/BLAD

## (undated) DEVICE — STOPCOCK 3-WAY

## (undated) DEVICE — SEALANT VISTASEAL FIBRIN 10ML

## (undated) DEVICE — SUT VICRYL PLUS 0 CT1 18IN

## (undated) DEVICE — GOWN AERO CHROME W/ TOWEL XL

## (undated) DEVICE — KIT SAHARA DRAPE DRAW/LIFT

## (undated) DEVICE — DRAPE TOP 53X102IN

## (undated) DEVICE — TRAY MINOR ORTHO OMC

## (undated) DEVICE — GUIDEWIRE STF .035X260CM ANG

## (undated) DEVICE — TRAY MINOR GEN SURG OMC

## (undated) DEVICE — BANDAGE ELAS SOFTWRAP ST 4X5YD

## (undated) DEVICE — COVER INSTR ELASTIC BAND 40X20

## (undated) DEVICE — SPONGE PATTY SURGICAL .5X3IN

## (undated) DEVICE — CLIP DOUBLE MICRO.

## (undated) DEVICE — SUT 2-0 12-18IN SILK

## (undated) DEVICE — LUBRICANT SURGILUBE 2 OZ

## (undated) DEVICE — APPLICATOR CHLORAPREP ORN 26ML

## (undated) DEVICE — CONTRAST FLEXCIL INJECTION

## (undated) DEVICE — SUT VICRYL PLUS 3-0 SH 18IN

## (undated) DEVICE — LAVAGE WOUND BACTISURE 1L BAG

## (undated) DEVICE — PENCIL EDGE SMOKE ROCKER

## (undated) DEVICE — REMOVER STAPLE SKIN STERILE

## (undated) DEVICE — MANIFOLD 4 PORT

## (undated) DEVICE — DRESSING AQUACEL FOAM 3 X 3

## (undated) DEVICE — DRAPE U SPLIT SHEET 54X76IN

## (undated) DEVICE — TIP YANKAUERS BULB NO VENT

## (undated) DEVICE — BOWL STERILE LARGE 32OZ

## (undated) DEVICE — CLAMP SINGLE MICRO.

## (undated) DEVICE — DRAPE STERI U-SHAPED 47X51IN

## (undated) DEVICE — SYR 3CC LUER LOC

## (undated) DEVICE — DRESSING VERSA-FOAM W/O TUBE

## (undated) DEVICE — INFLATOR ENCORE

## (undated) DEVICE — SYS VAC W/PAD CONN/CLM

## (undated) DEVICE — K-WIRE TRCR PT1.25MM D 150MM L
Type: IMPLANTABLE DEVICE | Site: RADIUS | Status: NON-FUNCTIONAL
Removed: 2024-07-19

## (undated) DEVICE — PAD CAST SPECIALIST STRL 4

## (undated) DEVICE — NDL 22GA X1 1/2 REG BEVEL

## (undated) DEVICE — PAD K-THERMIA 24IN X 60IN

## (undated) DEVICE — JELLY SURGILUBE LUBE PKT 3GM

## (undated) DEVICE — CATH IV INTROCAN 24G X 3/4

## (undated) DEVICE — K-WIRE TRCR PT1.6MM DIA 150MM
Type: IMPLANTABLE DEVICE | Site: ANKLE | Status: NON-FUNCTIONAL
Removed: 2024-07-26

## (undated) DEVICE — TOURNIQUET SB QC DP 34X4IN

## (undated) DEVICE — TRAY CATH LAB OMC

## (undated) DEVICE — PREVENA RESTOR

## (undated) DEVICE — SPONGE WEC CEL SPEARS

## (undated) DEVICE — BANDAGE ACE ELASTIC 6"

## (undated) DEVICE — BANDAGE ESMARK ELASTIC ST 4X9

## (undated) DEVICE — GUIDE MICRO-GRID SIL GRN

## (undated) DEVICE — SET MICROPUNCTURE

## (undated) DEVICE — ROD CARBON 11X150MM

## (undated) DEVICE — TOWEL OR DISP STRL BLUE 4/PK

## (undated) DEVICE — SOL NACL 0.9% IV INJ 1000ML

## (undated) DEVICE — DRESSING GRANUFOAM LARGE VAC

## (undated) DEVICE — SUT ETHILON 3/0 18IN PS-1

## (undated) DEVICE — DECANTER FLUID TRNSF WHITE 9IN

## (undated) DEVICE — KIT CUSTOM PROCEDURE CONTRAST

## (undated) DEVICE — STAPLER SKIN PROXIMATE WIDE

## (undated) DEVICE — SUT ETHILON 4-0 PS2 18 BLK

## (undated) DEVICE — DRAPE CORETEMP FLD WRM 56X62IN

## (undated) DEVICE — CABLE EXT DOPPLER FLOW PROBE

## (undated) DEVICE — CUP MEDICINE STERILE 2OZ

## (undated) DEVICE — BANDAGE ESMARK 6X12

## (undated) DEVICE — SEE MEDLINE ITEM 152529

## (undated) DEVICE — BOOT AIR FLUID HEEL ADLT STD

## (undated) DEVICE — SUT 0 18IN SILK BLK BRAIDE

## (undated) DEVICE — DEVICE CLOSURE MYNX GRIP 5FR

## (undated) DEVICE — BANDAGE ROLL COTTN 4.5INX4.1YD

## (undated) DEVICE — PAD ABDOMINAL STERILE 8X10IN

## (undated) DEVICE — WIRE-K 20MM X 150MM.
Type: IMPLANTABLE DEVICE | Site: ANKLE | Status: NON-FUNCTIONAL
Removed: 2024-07-26

## (undated) DEVICE — SPONGE GAUZE 16PLY 4X4

## (undated) DEVICE — TRAY SKIN SCRUB WET PREMIUM

## (undated) DEVICE — SUT 9/0 5IN ETHILON BLK MON

## (undated) DEVICE — SOL VASHE INSTILLATION WND 16

## (undated) DEVICE — DRAPE HAND STERILE

## (undated) DEVICE — TAPE SURG DURAPORE 2 X10YD

## (undated) DEVICE — SYS LABEL CORRECT MED

## (undated) DEVICE — GUIDEWIRE ADVNTG 018X300CM ANG

## (undated) DEVICE — KIT URINARY CATH URINE METER

## (undated) DEVICE — SUT 2/0 18IN COATED VICRYL

## (undated) DEVICE — KIT MEROCEL INSTRUMENT WIPE

## (undated) DEVICE — CANISTER INFOV.A.C WOUND 500ML

## (undated) DEVICE — GOWN POLY REINF BRTH SLV XL

## (undated) DEVICE — DRESSING TRANS 4X4 TEGADERM

## (undated) DEVICE — SCREW STRDRV REC T8 2.4X24 SS
Type: IMPLANTABLE DEVICE | Site: RADIUS | Status: NON-FUNCTIONAL
Removed: 2024-07-19

## (undated) DEVICE — SUCTION SURGICAL STR 7FR

## (undated) DEVICE — SOCKINETTE IMPERVIOUS 12X48IN

## (undated) DEVICE — SLING SHOT 3 LARGE

## (undated) DEVICE — SOL 9P NACL IRR PIC IL

## (undated) DEVICE — SUT MONOCRYL 3-0 PS-2 UND

## (undated) DEVICE — GOWN SURGICAL X-LARGE

## (undated) DEVICE — CATH SEEKER CROSS .018X150

## (undated) DEVICE — GAUZE WOVEN STRL 12-PLY 4X4IN

## (undated) DEVICE — DRAPE STERI-DRAPE 1000 17X11IN

## (undated) DEVICE — WIRE FIX KIRSCH TRCR 1.8X150MM
Type: IMPLANTABLE DEVICE | Site: RADIUS | Status: NON-FUNCTIONAL
Removed: 2024-07-19

## (undated) DEVICE — SCREW CORTEX 2.7 X 18
Type: IMPLANTABLE DEVICE | Site: ANKLE | Status: NON-FUNCTIONAL
Removed: 2024-07-26

## (undated) DEVICE — CATH STERLING OTW 2X220X150

## (undated) DEVICE — DRESSING N ADH OIL EMUL 3X8

## (undated) DEVICE — DRESSING INFOVAC MED RND BLK

## (undated) DEVICE — SET IRR CYSTO TUR Y-TYPE 90IN

## (undated) DEVICE — DRESSING AQUACEL SACRAL 9 X 9

## (undated) DEVICE — INTRODUCER VASC RADPQ 5FRX10CM

## (undated) DEVICE — ELECTRODE BLADE INSULATED 1 IN

## (undated) DEVICE — COVERS PROBE NR-48 STERILE

## (undated) DEVICE — BIT DRILL QC 1.8X110MM

## (undated) DEVICE — BNDG COFLEX FOAM LF2 ST 4X5YD

## (undated) DEVICE — DRESSING INFOVAC LARGE BLK

## (undated) DEVICE — GUIDEWIRE STF .035X180CM ANG

## (undated) DEVICE — SUT ETHILON 3-0 PS2 18 BLK

## (undated) DEVICE — SOL IRR NACL .9% 3000ML

## (undated) DEVICE — 2.0 DRILL BIT

## (undated) DEVICE — CORD BIPOLAR 12 FOOT

## (undated) DEVICE — BLADE SAGITTAL 18 X 1.27 X 90M

## (undated) DEVICE — APPLIER LIGACLIP SM 9.38IN

## (undated) DEVICE — SUT MONOCRYL PLUS UD 3-0 27

## (undated) DEVICE — PACK SPY-PHI DRUG DRAPE

## (undated) DEVICE — GUIDEWIRE V-18 CONTROL .18

## (undated) DEVICE — BIT DRILL 3.5MM 110MM STERILE

## (undated) DEVICE — PADDING CAST 4IN SPECIALIST

## (undated) DEVICE — ADHESIVE DERMABOND ADVANCED

## (undated) DEVICE — HOOK STAY ELAS 5MM 8EA/PK

## (undated) DEVICE — INTRODUCER 5FR 70CM

## (undated) DEVICE — KIT MICROINTRO 4F .018X40X7CM

## (undated) DEVICE — HEMOSTAT SURGICEL 2X14IN

## (undated) DEVICE — BANDAGE ELAS SOFTWRAP ST 6X5YD

## (undated) DEVICE — SOL NACL 0.9% IV INJ 500ML

## (undated) DEVICE — BIT DRILL 2.0MM D DEPTH 110MM

## (undated) DEVICE — SUT 3/0 30IN ETHILON BLK M

## (undated) DEVICE — SUT ETHIBOND XTRA 1 OS-6

## (undated) DEVICE — MICRO CLIP

## (undated) DEVICE — DRAPE OPMI STERILE

## (undated) DEVICE — APPLIER LIGACLIP MED 11IN

## (undated) DEVICE — CATH IV JELCO 24G X 3/4

## (undated) DEVICE — SUT VICRYL 2-0 36 CT-1

## (undated) DEVICE — BNDG COFLEX FOAM LF2 ST 6X5YD

## (undated) DEVICE — NDL HYPO REG 25G X 1 1/2